# Patient Record
Sex: MALE | Race: OTHER | HISPANIC OR LATINO | Employment: OTHER | URBAN - METROPOLITAN AREA
[De-identification: names, ages, dates, MRNs, and addresses within clinical notes are randomized per-mention and may not be internally consistent; named-entity substitution may affect disease eponyms.]

---

## 2017-02-26 ENCOUNTER — GENERIC CONVERSION - ENCOUNTER (OUTPATIENT)
Dept: OTHER | Facility: OTHER | Age: 82
End: 2017-02-26

## 2017-06-27 ENCOUNTER — GENERIC CONVERSION - ENCOUNTER (OUTPATIENT)
Dept: OTHER | Facility: OTHER | Age: 82
End: 2017-06-27

## 2017-07-18 ENCOUNTER — GENERIC CONVERSION - ENCOUNTER (OUTPATIENT)
Dept: OTHER | Facility: OTHER | Age: 82
End: 2017-07-18

## 2017-08-08 ENCOUNTER — ALLSCRIPTS OFFICE VISIT (OUTPATIENT)
Dept: OTHER | Facility: OTHER | Age: 82
End: 2017-08-08

## 2017-08-08 DIAGNOSIS — I71.2 THORACIC AORTIC ANEURYSM WITHOUT RUPTURE (HCC): ICD-10-CM

## 2017-08-08 DIAGNOSIS — I71.4 ABDOMINAL AORTIC ANEURYSM WITHOUT RUPTURE (HCC): ICD-10-CM

## 2017-08-08 DIAGNOSIS — J44.9 CHRONIC OBSTRUCTIVE PULMONARY DISEASE (HCC): ICD-10-CM

## 2017-08-19 ENCOUNTER — HOSPITAL ENCOUNTER (OUTPATIENT)
Dept: CT IMAGING | Facility: HOSPITAL | Age: 82
Discharge: HOME | End: 2017-08-19
Attending: INTERNAL MEDICINE
Payer: COMMERCIAL

## 2017-08-19 DIAGNOSIS — J44.9 CHRONIC OBSTRUCTIVE PULMONARY DISEASE (HCC): ICD-10-CM

## 2017-08-19 PROCEDURE — 71275 CT ANGIOGRAPHY CHEST: CPT

## 2017-08-19 RX ADMIN — IOHEXOL 100 ML: 350 INJECTION, SOLUTION INTRAVENOUS at 09:39

## 2017-09-05 ENCOUNTER — HOSPITAL ENCOUNTER (OUTPATIENT)
Dept: NON INVASIVE DIAGNOSTICS | Facility: HOSPITAL | Age: 82
Discharge: HOME | End: 2017-09-05
Attending: INTERNAL MEDICINE
Payer: COMMERCIAL

## 2017-09-05 ENCOUNTER — HOSPITAL ENCOUNTER (OUTPATIENT)
Dept: NUCLEAR MEDICINE | Facility: HOSPITAL | Age: 82
Discharge: HOME | End: 2017-09-05
Attending: INTERNAL MEDICINE
Payer: COMMERCIAL

## 2017-09-05 DIAGNOSIS — I71.4 ABDOMINAL AORTIC ANEURYSM WITHOUT RUPTURE (HCC): ICD-10-CM

## 2017-09-05 DIAGNOSIS — J44.9 CHRONIC OBSTRUCTIVE PULMONARY DISEASE (HCC): ICD-10-CM

## 2017-09-05 DIAGNOSIS — I71.2 THORACIC AORTIC ANEURYSM WITHOUT RUPTURE (HCC): ICD-10-CM

## 2017-09-05 LAB
CHEST PAIN STATEMENT: NORMAL
MAX DIASTOLIC BP: 104 MMHG
MAX HEART RATE: 104 BPM
MAX PREDICTED HEART RATE: 140 BPM
MAX. SYSTOLIC BP: 180 MMHG
PROTOCOL NAME: NORMAL
REASON FOR TERMINATION: NORMAL
TARGET HR FORMULA: NORMAL
TIME IN EXERCISE PHASE: 198 S

## 2017-09-05 PROCEDURE — A9502 TC99M TETROFOSMIN: HCPCS

## 2017-09-05 PROCEDURE — 93306 TTE W/DOPPLER COMPLETE: CPT

## 2017-09-05 PROCEDURE — 93017 CV STRESS TEST TRACING ONLY: CPT

## 2017-09-05 PROCEDURE — 78452 HT MUSCLE IMAGE SPECT MULT: CPT

## 2017-09-05 RX ADMIN — REGADENOSON 0.4 MG: 0.08 INJECTION, SOLUTION INTRAVENOUS at 10:54

## 2017-11-02 ENCOUNTER — GENERIC CONVERSION - ENCOUNTER (OUTPATIENT)
Dept: OTHER | Facility: OTHER | Age: 82
End: 2017-11-02

## 2017-11-03 ENCOUNTER — GENERIC CONVERSION - ENCOUNTER (OUTPATIENT)
Dept: OTHER | Facility: OTHER | Age: 82
End: 2017-11-03

## 2017-11-06 ENCOUNTER — GENERIC CONVERSION - ENCOUNTER (OUTPATIENT)
Dept: OTHER | Facility: OTHER | Age: 82
End: 2017-11-06

## 2018-01-12 VITALS
SYSTOLIC BLOOD PRESSURE: 130 MMHG | BODY MASS INDEX: 29.59 KG/M2 | HEART RATE: 72 BPM | DIASTOLIC BLOOD PRESSURE: 92 MMHG | HEIGHT: 63 IN | WEIGHT: 167 LBS

## 2018-02-01 ENCOUNTER — OFFICE VISIT (OUTPATIENT)
Dept: CARDIAC SURGERY | Facility: CLINIC | Age: 83
End: 2018-02-01
Payer: COMMERCIAL

## 2018-02-01 VITALS
SYSTOLIC BLOOD PRESSURE: 128 MMHG | HEART RATE: 72 BPM | TEMPERATURE: 97.1 F | WEIGHT: 163 LBS | DIASTOLIC BLOOD PRESSURE: 72 MMHG | HEIGHT: 63 IN | RESPIRATION RATE: 14 BRPM | BODY MASS INDEX: 28.88 KG/M2

## 2018-02-01 DIAGNOSIS — I71.9 DESCENDING AORTIC ANEURYSM (HCC): Primary | ICD-10-CM

## 2018-02-01 PROBLEM — I10 HYPERTENSION: Status: ACTIVE | Noted: 2018-02-01

## 2018-02-01 PROBLEM — I73.9 PVD (PERIPHERAL VASCULAR DISEASE) (HCC): Status: ACTIVE | Noted: 2018-02-01

## 2018-02-01 PROBLEM — Z86.718 HISTORY OF DVT (DEEP VEIN THROMBOSIS): Status: ACTIVE | Noted: 2018-02-01

## 2018-02-01 PROBLEM — J44.9 COPD (CHRONIC OBSTRUCTIVE PULMONARY DISEASE) (HCC): Status: ACTIVE | Noted: 2018-02-01

## 2018-02-01 PROCEDURE — 99204 OFFICE O/P NEW MOD 45 MIN: CPT | Performed by: THORACIC SURGERY (CARDIOTHORACIC VASCULAR SURGERY)

## 2018-02-01 RX ORDER — PANTOPRAZOLE SODIUM 40 MG/1
1 TABLET, DELAYED RELEASE ORAL DAILY
COMMUNITY
Start: 2017-08-08 | End: 2018-06-25 | Stop reason: SDUPTHER

## 2018-02-01 RX ORDER — LOSARTAN POTASSIUM 100 MG/1
1 TABLET ORAL DAILY
COMMUNITY
Start: 2017-08-08 | End: 2018-07-30 | Stop reason: SDUPTHER

## 2018-02-01 RX ORDER — ALBUTEROL SULFATE 90 UG/1
2 AEROSOL, METERED RESPIRATORY (INHALATION)
COMMUNITY
Start: 2017-08-08

## 2018-02-01 RX ORDER — ASPIRIN 81 MG/1
81 TABLET ORAL DAILY
COMMUNITY
End: 2018-12-31 | Stop reason: HOSPADM

## 2018-02-01 RX ORDER — BUDESONIDE AND FORMOTEROL FUMARATE DIHYDRATE 160; 4.5 UG/1; UG/1
2 AEROSOL RESPIRATORY (INHALATION) 2 TIMES DAILY
COMMUNITY
End: 2018-06-25 | Stop reason: SDUPTHER

## 2018-02-01 RX ORDER — ALBUTEROL SULFATE 2.5 MG/3ML
SOLUTION RESPIRATORY (INHALATION) AS NEEDED
COMMUNITY
Start: 2017-08-08 | End: 2018-06-25 | Stop reason: SDUPTHER

## 2018-02-01 RX ORDER — ATORVASTATIN CALCIUM 10 MG/1
10 TABLET, FILM COATED ORAL DAILY
COMMUNITY
End: 2018-05-16 | Stop reason: SDUPTHER

## 2018-02-01 RX ORDER — ACETAMINOPHEN 325 MG/1
1-2 TABLET ORAL EVERY 4 HOURS PRN
COMMUNITY
Start: 2017-08-08 | End: 2018-12-31 | Stop reason: HOSPADM

## 2018-02-01 RX ORDER — AMLODIPINE BESYLATE 10 MG/1
10 TABLET ORAL DAILY
COMMUNITY
End: 2018-07-21

## 2018-02-01 NOTE — PROGRESS NOTES
Consultation - Cardiothoracic Surgery   Shola Puente [de-identified] y o  male MRN: [id number]    Physician Requesting Consult: No att  providers found    Reason for Consult / Principal Problem: Descending thoracic aortic aneurysm    History of Present Illness: Shola Puente is a [de-identified]y o  year old male who presents for evaluation of descending aortic aneurysm  Patient is Tajik speaking only & is accompanied by his grandson son who translates/assists in answering medical questions  Patient states this is a new diagnosis for him that was found on an incidental CT scan for a cough at [facility] a "few months ago"  Was dx w/ PNA & tx  States was discharged then referred to Dr Enrique Claros for further evaluation  Per Dr Enrique Claros notes patient was having some SOB, palpitations & vague CP at the time  Had CTA chest, TTE & rx stress test  Stress test WNL  TTE w/ preserved EF & no major valvular dysfunction  CTA w/ stable descending aortic aneurysm  Patient has attempted to see us twice w/ cancellations for personal reasons  Upon examination today, Mr Threese Lanza reports he is feeling well overall  Denies CP, palpitations, SOB, GONSALVES, LE edema b/l, orthopnea, PND, numbness/tinlging/paresthesias in UE or LE bilaterally, HA, lightheadeness, dizziness, presyncopal symptoms, hx syncopal events, N/V/D, hemoptysis, hematemesis, hematochezia, melena  Denies hx stroke, hx cancer w/ chest wall radiation, hx blood loss anemia, hx varicose veins or vein stripping  (+) h/o LLE DVT tx w/ medication  PMH includes HTn, COPD (wears 2LNC intermittently at home), PVD, h/o DVT (LLE, unsure of how long ago, tx w/ medications)  PSH includes hernia repair (unsure what kind, thinks inguinal, unsure of which side)  Denies FH of CAD/MI, HTN, HL, valvular disease, DM, aortic aneurysms, or SCD  Patient is retired  Lives in a multilevel home w/ wife  Does not use an assist device for ambulation  Does not drive but is active & performs ADLs independetly   (+) former tobacco use (<1ppd x20 years, quit 2003)  Denies current or prior use of ETOH or drugs  Allergies include PCN with rxn hives  Cardiac pertinent medications include: Aspirin 81mg, Lipitor 10mg, Symbicort, Losartan 100mg  Other medications can be reviewed in the patient chart  Patient sees Dr Emily Pearce (? Patient unsure of gomez / [facility]) as his primary care physician  Patient sees Dr Vanita Vega as his cardiologist     Past Medical History:  Past Medical History:   Diagnosis Date    Hypertension     Ulcer ([address] )     Varicose vein of leg      Past Surgical History:   Past Surgical History:   Procedure Laterality Date    HERNIA REPAIR       Family History:  Family History   Problem Relation Age of Onset    No Known Problems Mother     No Known Problems Father     Cancer Sister      Social History:    History   Alcohol Use No     History   Drug Use No     History   Smoking Status    Former Smoker    Years: 35 00    Quit date: 2003   Smokeless Tobacco    Never Used     Home Medications:   Prior to Admission medications    Medication Sig Start Date End Date Taking? Authorizing Provider   acetaminophen (TYLENOL) 325 mg tablet Take 1-2 tablets by mouth every 4 (four) hours as needed 8/8/17  Yes Historical Provider, MD   albuterol (2 5 mg/3 mL) 0 083 % nebulizer solution Inhale as needed 8/8/17  Yes Historical Provider, MD   albuterol (VENTOLIN HFA) 90 mcg/act inhaler Inhale 2 puffs 8/8/17  Yes Historical Provider, MD   losartan (COZAAR) 100 MG tablet Take 1 tablet by mouth daily 8/8/17  Yes Historical Provider, MD   pantoprazole (PROTONIX) 40 mg tablet Take 1 tablet by mouth daily 8/8/17  Yes Historical Provider, MD       Allergies:   Allergies   Allergen Reactions    Penicillins Hives and Itching       Review of Systems:  Review of Systems - History obtained from spouse and grandson, chart review and the patient  General ROS: negative  Hematological and Lymphatic ROS: negative  Endocrine ROS: negative  Respiratory ROS: negative  Cardiovascular ROS: negative  Gastrointestinal ROS: negative  Genito-Urinary ROS: negative  Musculoskeletal ROS: negative  Neurological ROS: negative  Dermatological ROS: negative  All other ROS negative    Vital Signs:     Vitals:    02/01/18 1419 02/01/18 1420   BP: 136/82 128/72   BP Location: Left arm Right arm   Patient Position: Sitting Sitting   Cuff Size: Adult Adult   Pulse: 72    Resp: 14    Temp: (!) 97 1 °F (36 2 °C)    TempSrc: Oral    Weight: 73 9 kg (163 lb)    Height: 5' 3" (1 6 m)      Invasive Devices          No matching active lines, drains, or airways          Physical Exam:    HEENT/NECK:  PERRLA  No jugular venous distention  Cardiac:RRR  (+)S1/S2  No murmurs/rubs/gallops  No heaves/lifts  Pulmonary:  Breath sounds clear bilaterally  Good inspiratory effort, equal expansion bilaterally  CTA b/l  No wheezes/rales/rhonchi  Abdomen:  Non-tender, Non-distended  Positive bowel sounds  (+) pulsitile mass felt slightly left of midline  Lower extremities: Extremities warm/dry  Radial/PT/DP pulses 2+ bilaterally  No edema B/L  Neuro: Alert and oriented X 3  Sensation is grossly intact  No focal deficits  Skin: Warm/Dry, without rashes or lesions      Lab Results:                 No results found for: HGBA1C  Lab Results   Component Value Date    CKTOTAL 105 02/26/2014       Imaging Studies:     CT Chest w/ 2/2014: aortic arch 3 7, ascending aorta 3 8, descending aorta 4 7, infrarenal AAA 3 3x3 9    CTA chest w/o 8/2017: ascending 3 7, distal arch 3 6, descending aorta 4 7    Trans thoracic Echocardiogram 9/2017: EF 55%, grade 1 DD, AV trileaflet w/o AS/AI    Rx Stress Test 9/2017: no CP, no EKG changes, no perfusion defects, EF 65%    I have personally reviewed pertinent films in PACS    Assessment:  Patient Active Problem List    Diagnosis Date Noted    Hypertension 02/01/2018    COPD (chronic obstructive pulmonary disease) [facility] ) 02/01/2018    PVD (peripheral vascular disease) ([facility] ) 2018    Descending aortic aneurysm ([facility] ) 2018     Descending aortic aneurysm;    Plan:    Mr  Albert Amos has been evaluated in our office today for descending aortic aneurysm  He is asymptomatic & it appears he may have had this in the past & was followed by a vascular surgeon based on his history of seeing Dr Ronaldo Handley & noted aneurysm from [facility] in   Most recent CT scan demonstrates maximum measurement of descending aorta at 4 7cm which is stable in comparison to prior studies  I counseled the patient on his lifestyle restrictions as well as routine follow-up with his cardiologist for management of his other cardiac conditions  Education was provided in regards to the followin )  Maintaining good blood pressure control and taking antihypertensive medications as prescribed  2 )  No strenuous activity involving lifting more than 50lbs  3 )  Awareness of the warning symptoms of aortic dissection such as chest pain, back pain, shortness of breath, lightheadedness or dizziness and to seek immediate medical attention at the nearest ER   4 )  The importance of continued surveillance with visits in the Aortic Disease clinic and obtaining CT Scans as recommended  5 ) Importance of avoiding tobacco products  We recommend follow-up in the aortic clinic in six months with CTA chest/abdomen/palvis for ongoing surveillance  Rey Poe was comfortable with our recommendations, and their questions were answered to their satisfaction  Thank you for allowing us to participate in the care of this patient  Aortic Aneurysm Instructions were provided to the patient as follows:    1  No lifting more than 50 pounds  2  Maintain a controlled blood pressure with a goal of 120/80  3  Follow up in Aortic Clinic as recommended with radiology follow up as instructed  4   Report to the ER or call 911 immediately with the following signs / symptoms: sudden onset of back pain, chest pain or shortness of breath  5  Tobacco cessation discussed      SIGNATURE: Pepe Cole PA-C  DATE: February 1, 2018  TIME: 2:29 PM

## 2018-02-01 NOTE — LETTER
February 1, 2018     Von Daniels MD  [address] [place] [address]    Patient: Juan C Sanches   YOB: 1935   Date of Visit: 2/1/2018       Dear Dr Mark Miller: Thank you for referring Juan C Sanches to me for evaluation  Below are my notes for this consultation  If you have questions, please do not hesitate to call me  I look forward to following your patient along with you  Sincerely,        Fatuma Waters MD        CC: MD Pepe Carrasco PA-C  2/1/2018  3:12 PM  Attested  Consultation - Cardiothoracic Surgery   Juan C Sanches [de-identified] y o  male MRN: [id number]    Physician Requesting Consult: No att  providers found    Reason for Consult / Principal Problem: Descending thoracic aortic aneurysm    History of Present Illness: Juan C Sanches is a [de-identified]y o  year old male who presents for evaluation of descending aortic aneurysm  Patient is Irish speaking only & is accompanied by his grandson son who translates/assists in answering medical questions  Patient states this is a new diagnosis for him that was found on an incidental CT scan for a cough at [facility] a "few months ago"  Was dx w/ PNA & tx  States was discharged then referred to Dr Darrell Bassett for further evaluation  Per Dr Darrell Bassett notes patient was having some SOB, palpitations & vague CP at the time  Had CTA chest, TTE & rx stress test  Stress test WNL  TTE w/ preserved EF & no major valvular dysfunction  CTA w/ stable descending aortic aneurysm  Patient has attempted to see us twice w/ cancellations for personal reasons  Upon examination today, Mr Brigitte Rodriguez reports he is feeling well overall  Denies CP, palpitations, SOB, GONSALVES, LE edema b/l, orthopnea, PND, numbness/tinlging/paresthesias in UE or LE bilaterally, HA, lightheadeness, dizziness, presyncopal symptoms, hx syncopal events, N/V/D, hemoptysis, hematemesis, hematochezia, melena   Denies hx stroke, hx cancer w/ chest wall radiation, hx blood loss anemia, hx varicose veins or vein stripping  (+) h/o LLE DVT tx w/ medication  PMH includes HTn, COPD (wears 2LNC intermittently at home), PVD, h/o DVT (LLE, unsure of how long ago, tx w/ medications)  PSH includes hernia repair (unsure what kind, thinks inguinal, unsure of which side)  Denies FH of CAD/MI, HTN, HL, valvular disease, DM, aortic aneurysms, or SCD  Patient is retired  Lives in a multilevel home w/ wife  Does not use an assist device for ambulation  Does not drive but is active & performs ADLs independetly  (+) former tobacco use (<1ppd x20 years, quit 2003)  Denies current or prior use of ETOH or drugs  Allergies include PCN with rxn hives  Cardiac pertinent medications include: Aspirin 81mg, Lipitor 10mg, Symbicort, Losartan 100mg  Other medications can be reviewed in the patient chart  Patient sees Dr Fatoumata Hilton [facility] as his primary care physician  Patient sees Dr Panfilo Jerome as his cardiologist     Past Medical History:  Past Medical History:   Diagnosis Date    Hypertension     Ulcer ([address] )     Varicose vein of leg      Past Surgical History:   Past Surgical History:   Procedure Laterality Date    HERNIA REPAIR       Family History:  Family History   Problem Relation Age of Onset    No Known Problems Mother     No Known Problems Father     Cancer Sister      Social History:    History   Alcohol Use No     History   Drug Use No     History   Smoking Status    Former Smoker    Years: 35 00    Quit date: 2003   Smokeless Tobacco    Never Used     Home Medications:   Prior to Admission medications    Medication Sig Start Date End Date Taking?  Authorizing Provider   acetaminophen (TYLENOL) 325 mg tablet Take 1-2 tablets by mouth every 4 (four) hours as needed 8/8/17  Yes Historical Provider, MD   albuterol (2 5 mg/3 mL) 0 083 % nebulizer solution Inhale as needed 8/8/17  Yes Historical Provider, MD   albuterol (VENTOLIN HFA) 90 mcg/act inhaler Inhale 2 puffs 8/8/17  Yes Historical Provider, MD   losartan (COZAAR) 100 MG tablet Take 1 tablet by mouth daily 8/8/17  Yes Historical Provider, MD   pantoprazole (PROTONIX) 40 mg tablet Take 1 tablet by mouth daily 8/8/17  Yes Historical Provider, MD       Allergies: Allergies   Allergen Reactions    Penicillins Hives and Itching       Review of Systems:  Review of Systems - History obtained from spouse and grandson, chart review and the patient  General ROS: negative  Hematological and Lymphatic ROS: negative  Endocrine ROS: negative  Respiratory ROS: negative  Cardiovascular ROS: negative  Gastrointestinal ROS: negative  Genito-Urinary ROS: negative  Musculoskeletal ROS: negative  Neurological ROS: negative  Dermatological ROS: negative  All other ROS negative    Vital Signs:     Vitals:    02/01/18 1419 02/01/18 1420   BP: 136/82 128/72   BP Location: Left arm Right arm   Patient Position: Sitting Sitting   Cuff Size: Adult Adult   Pulse: 72    Resp: 14    Temp: (!) 97 1 °F (36 2 °C)    TempSrc: Oral    Weight: 73 9 kg (163 lb)    Height: 5' 3" (1 6 m)      Invasive Devices          No matching active lines, drains, or airways          Physical Exam:    HEENT/NECK:  PERRLA  No jugular venous distention  Cardiac:RRR  (+)S1/S2  No murmurs/rubs/gallops  No heaves/lifts  Pulmonary:  Breath sounds clear bilaterally  Good inspiratory effort, equal expansion bilaterally  CTA b/l  No wheezes/rales/rhonchi  Abdomen:  Non-tender, Non-distended  Positive bowel sounds  (+) pulsitile mass felt slightly left of midline  Lower extremities: Extremities warm/dry  Radial/PT/DP pulses 2+ bilaterally  No edema B/L  Neuro: Alert and oriented X 3  Sensation is grossly intact  No focal deficits  Skin: Warm/Dry, without rashes or lesions      Lab Results:                 No results found for: HGBA1C  Lab Results   Component Value Date    CKTOTAL 105 02/26/2014       Imaging Studies:     CT Chest w/ 2/2014: aortic arch 3 7, ascending aorta 3 8, descending aorta 4 7, infrarenal AAA 3 3x3 9    CTA chest w/o 2017: ascending 3 7, distal arch 3 6, descending aorta 4 7    Trans thoracic Echocardiogram 2017: EF 55%, grade 1 DD, AV trileaflet w/o AS/AI    Rx Stress Test 2017: no CP, no EKG changes, no perfusion defects, EF 65%    I have personally reviewed pertinent films in PACS    Assessment:  Patient Active Problem List    Diagnosis Date Noted    Hypertension 2018    [facility]ch[facility] disease) [facility] ) 2018    PVD (peripheral vascular disease) ([facility] ) 2018    [facility] 2018     Descending aortic aneurysm;    Plan:    Mr Therese Lanza has been evaluated in our office today for descending aortic aneurysm  He is asymptomatic & it appears he may have had this in the past & was followed by a vascular surgeon based on his history of seeing Dr Joe Miller & noted aneurysm from [address] in   Most recent CT scan demonstrates maximum measurement of descending aorta at 4 7cm which is stable in comparison to prior studies  I counseled the patient on his lifestyle restrictions as well as routine follow-up with his cardiologist for management of his other cardiac conditions  Education was provided in regards to the followin )  Maintaining good blood pressure control and taking antihypertensive medications as prescribed  2 )  No strenuous activity involving lifting more than 50lbs  3 )  Awareness of the warning symptoms of aortic dissection such as chest pain, back pain, shortness of breath, lightheadedness or dizziness and to seek immediate medical attention at the nearest ER   4 )  The importance of continued surveillance with visits in the Aortic Disease clinic and obtaining CT Scans as recommended  5 ) Importance of avoiding tobacco products      We recommend follow-up in the aortic clinic in six months with CTA chest/abdomen/palvis for ongoing surveillance  Brandie Romero was comfortable with our recommendations, and their questions were answered to their satisfaction  Thank you for allowing us to participate in the care of this patient  Aortic Aneurysm Instructions were provided to the patient as follows:    1  No lifting more than 50 pounds  2  Maintain a controlled blood pressure with a goal of 120/80  3  Follow up in Aortic Clinic as recommended with radiology follow up as instructed  4  Report to the ER or call 911 immediately with the following signs / symptoms: sudden onset of back pain, chest pain or shortness of breath  5  Tobacco cessation discussed  Sandee Overton PA-C  DATE: February 1, 2018  TIME: 2:29 PM  Attestation signed by Bernardo Burk MD at 2/1/2018  3:18 PM:  The patient is an 51-year-old man with an incidental finding of a descending thoracic aorta aneurysm  He is completely asymptomatic  I have personally reviewed his imaging, this includes a CTA performed in September 2017 and 1 in February of 2014, there are some significant changes, maximum diameter of the descending thoracic aorta at the level of the inferior pulmonary vein is 4 3 centimeters, this is a significant change from 3 6 centimeters back in 2014  There is no need for surgical intervention at this point  I would like to see him back in 6 months with CTA of the chest abdomen and pelvis  He will also be referred for vascular surgeon for evaluation of a abdominal aorta aneurysm  The patient understand his diagnosis, my recommendation and agrees to proceed with them

## 2018-02-01 NOTE — PATIENT INSTRUCTIONS
Education was provided in regards to the followin )  Maintaining good blood pressure control and taking antihypertensive medications as prescribed  2 )  No strenuous activity involving lifting more than 50lbs  3 )  Awareness of the warning symptoms of aortic dissection such as chest pain, back pain, shortness of breath, lightheadedness or dizziness and to seek immediate medical attention at the nearest ER   4 )  The importance of continued surveillance with visits in the Aortic Disease clinic and obtaining CT Scans as recommended  5 ) Importance of avoiding tobacco products

## 2018-02-28 ENCOUNTER — OFFICE VISIT (OUTPATIENT)
Dept: VASCULAR SURGERY | Facility: CLINIC | Age: 83
End: 2018-02-28
Payer: COMMERCIAL

## 2018-02-28 VITALS
HEART RATE: 80 BPM | HEIGHT: 63 IN | DIASTOLIC BLOOD PRESSURE: 88 MMHG | WEIGHT: 163 LBS | RESPIRATION RATE: 14 BRPM | SYSTOLIC BLOOD PRESSURE: 138 MMHG | TEMPERATURE: 97.2 F | BODY MASS INDEX: 28.88 KG/M2

## 2018-02-28 DIAGNOSIS — I71.9 DESCENDING AORTIC ANEURYSM (HCC): ICD-10-CM

## 2018-02-28 DIAGNOSIS — I71.2 THORACIC AORTIC ANEURYSM WITHOUT RUPTURE (HCC): Primary | ICD-10-CM

## 2018-02-28 PROBLEM — I10 BENIGN ESSENTIAL HYPERTENSION: Status: ACTIVE | Noted: 2017-08-02

## 2018-02-28 PROBLEM — I71.20 THORACIC AORTIC ANEURYSM: Status: ACTIVE | Noted: 2017-08-02

## 2018-02-28 PROCEDURE — 99204 OFFICE O/P NEW MOD 45 MIN: CPT | Performed by: SURGERY

## 2018-02-28 NOTE — PATIENT INSTRUCTIONS
Aneurisma de la aorta torácica   LO QUE NECESITA SABER:   ¿Qué es un aneurisma de la aorta torácica? Un aneurisma de la aorta torácica, o AAT, es un abultamiento en la aorta que ocurre cuando las cox de la aorta se debilitan  La aorta es un vaso sanguíneo al que sale del corazón, pasa por el centro del pecho y llega al abdomen  ¿Cuál es la causa de un aneurisma de la aorta torácica? · Ateroesclerosis:  Es el endurecimiento de las arterias  Kelsey endurecimiento tiene lugar cuando se acumulan depósitos de grasa, que se conocen [address], en las cox de las arterias  · Desarrollo anormal:  Es posible que sj parte de rosa corazón y de rosa aorta no se hayan desarrollado de forma normal      · Inflamación:  Es posible que las cox de rosa aorta estén inflamadas flako resultado de sj enfermedad inflamatoria o de ciertas infecciones, flako la sífilis  · Pérdida de la elasticidad:  Con la edad, es posible que los vasos sanguíneos del cuerpo pierdan parte de rosa elasticidad  Es posible que esto se chago acentuado si tiene presión arterial elevada ramesh un período prolongado de [place]  Ciertos trastornos [employer] cox de las arterias pierdan elasticidad  · Trauma:  Las lesiones, particularmente en el área del [place], pueden conducir a sj ruptura parcial o total de la aorta  ¿Qué aumenta mi riesgo de tener un aneurisma de la aorta torácica? · Fumar cigarrillos    · Presión arterial ryan    · Tener un familiar cercano que ha padecido de AAT    · Ser de sexo masculino y mayor de 72 años de edad    · Diabetes     · Sobrepeso  ¿Cuáles son los signos y síntomas de un aneurisma de la aorta torácica? Es posible que usted no tenga ningún signo o síntoma   Cuando sí se presentan signos y síntomas, los siguientes son comunes:  · Dolor en el pecho, el aurelio, la espalda o el abdomen    · Tos o presencia de jeanette en el esputo    · Ronquera    · Dificultad para respirar que puede verse afectada por la posición    · Dificultad para tragar    · Sibilancias  ¿Cómo se diagnostica un aneurisma de la aorta torácica? Es posible que deba hacerse más de sj de las siguientes pruebas: Es posible que le administren un medio de contraste para ayudar a que las imágenes marito más claras  Dígale al médico si usted alguna vez ha tenido sj reacción alérgica al tinte de [place]  · Radiografía:  Muestran los pulmones, el corazón, la aorta y otras partes del cuerpo que se encuentran a rosa alrededor  [place] de la aorta con medio de contraste se llama aortograma o aortografía  · Tomografía computarizada:  Pam examen también se conoce flako escán TAC  [place] de audra x [place] sj computadora para samantha imágenes de rosa pecho y florin vasos sanguíneos  Es posible que las imágenes muestren un aneurisma de la aorta torácica  · Imágenes por resonancia magnética (IRM):  Para realizar pam estudio se utilizan imanes potentes y [place] computadora para samantha imágenes de rosa pecho y florin vasos sanguíneos  Es posible que las imágenes por resonancia magnética muestren un aneurisma de la aorta torácica  No entre a la cristian donde se realiza la resonancia magnética con algo de metal  El metal puede causar lesiones serias  Dígale al médico si usted tiene algo de metal por dentro o sobre rosa cuerpo  · Ecocardiograma transesofágico:        ¨ Sj ecocardiografía transesofágica (CARINA) es un tipo de ultrasonido que enseña imágenes del tamaño y forma de rosa corazón  También muestra la forma flako rosa corazón se mueve al palpitar  Estas imágenes pueden observarse en sj pantalla con aspecto de televisor  Es posible que usted necesite un ecocardiograma transesofágico si rosa corazón no se puede elvia muy girish en un ecocardiograma regular  También es posible que necesite un ecocardiograma transesofágico para revisar ciertos problemas flako coágulos de Jean o infecciones dentro de rosa corazón       ¨ Le aplicarán un medicamento para que usted permanezca relajado ramesh el ecocardiograma transesofágico  Los médicos colocarán un tubo en rosa boca y lo harán llegar hasta rosa esófago  El tubo está equipado con un sensor de ultrasonido en el extremo  Puesto que rosa esófago está ubicado junto al corazón, rosa médico podrá elvia claramente rosa corazón  ¿Cuál es el tratamiento para un aneurisma de la aorta torácica? · Medicamentos:  Es posible que le den medicamentos para la presión arterial o el colesterol con el fin de evitar que rosa aneurisma de la aorta torácica continúe creciendo  · Reparación:  Es posible que los médicos corten el aneurisma y reemplacen el área que cortaron con un tubo artificial  Si la válvula aórtica también tiene un problema, es posible que también la sustituyan  · Stent:  Es posible que coloquen un stent (tubo) en la porción de la aorta que tiene el aneurisma  El stent podría fortalecer la aorta y disminuir la presión en las cox de la aorta  ¿Cuáles son los riesgos de tener un aneurisma de la aorta torácica? Ollie resultado de la [place] médula platt se podría dañar, podría tener sj hemorragia o sj infección  Si sangra demasiado, podría producirse insuficiencia de florin órganos, ollie los [place]  Podría necesitar otra cirugía  Si no recibe tratamiento, el aneurisma de la aorta torácica puede causar más problemas graves  El aneurisma podría aumentar de carlin Walker y deeer en peligro rosa janet  ¿Cómo puedo controlar los síntomas? · Tómese la presión arterial franki ollie le indicaron:  Anote rosa presión arterial y traiga esta información a las consultas de seguimiento  Pregunte a rosa médico cuánto debería de tener de presión y cómo tomársela  La presión arterial elevada puede hacer que el aneurisma empeore  · Ejercicio:  Pida a rosa médico que lo ayude a crear un programa de ejercicio   Es posible que esto contribuya a bajar rosa presión arterial      · Consuma alimentos saludables y variados:  Los alimentos saludables incluyen frutas, verduras, pan integral, productos lácteos bajos en grasa, frijoles, [place] magras y pescado  Pregunte si necesita seguir sj dieta especial     · No fume:  Si usted fuma, nunca es demasiado tarde para dejar de hacerlo  El riesgo de tener un aneurisma de la aorta torácica y enfermedades cardíacas aumenta cuando se fuma  Solicite información a pruitt médico si usted necesita ayuda para dejar de fumar  ¿Cuándo manuel comunicarme con mi médico?   · Usted tiene fiebre  · Tiene nuevos síntomas desde la última consulta  · Cammie síntomas le impiden llevar a cabo cammie actividades diarias  · Usted tiene preguntas o inquietudes acerca de pruitt condición o cuidado  ¿Cuándo manuel buscar atención inmediata o llamar al 911? · Usted tiene náuseas y vómitos  · Siente dolor repentino en el pecho, el aurelio, la espalda o el abdomen  · La piel se le pone pálida y sudorosa o se siente muy débil  · Usted se siente mareado o se desmaya  ACUERDOS SOBRE PRUITT CUIDADO:   Usted tiene el derecho de ayudar a planear pruitt cuidado  Aprenda todo lo que pueda sobre pruitt condición y flako darle tratamiento  Discuta cammie opciones de tratamiento con cammie médicos para decidir el cuidado que usted desea recibir  Usted siempre tiene el derecho de rechazar el tratamiento  Esta información es sólo para uso en educación  Pruitt intención no es darle un consejo médico sobre enfermedades o tratamientos  Colsulte con pruitt Jacqui Roberto farmacéutico antes de seguir cualquier régimen médico para saber si es seguro y efectivo para usted  © 2017 [address] Information is for End User's use only and may not be sold, redistributed or otherwise used for commercial purposes  All illustrations and images included in CareNotes® are the copyrighted property of A D A M , Inc  or Brian Fall

## 2018-02-28 NOTE — PROGRESS NOTES
Assessment/Plan:    Patient is a [de-identified] yo M w/ HTN, CAD, COPD, hx DVT? (list in chart and has s/s of venous insufficiency but patient denies hx), small ascending aortic aneurysm, 46mm TAA, presents for vascular eval    Thoracic aortic aneurysm without rupture ([facility] [address] )  -     VAS abdominal aorta/iliacs; complete study; Future  -     VAS lower limb arterial duplex, complete bilateral; Future  -reviewed CTA of the chest which shows 46mm TAA; this CT does not include the abdomen and I cannot find any other imaging in the past that evaluates for AAA  -will get AOIL to eval for AAA although I do not palpate this on exam and will get LEADs to r/o popliteal and femoral aneruysm (femorals are prominent B; suspect ectasia)  -f/u after testing complete  -of note, patient is scheduled for CTA C/A/P in 8/18 by his PCP    Ascending aortic aneurysm ([facility] [address] )  -being managed by CT surgery; only 37mm at this time      Medications  -cont ASA/statin for life for best medical management    Subjective:      Patient ID: Francisco Cortes is a [de-identified] y o  male  CC  Patient is here to review test results  Patient is new to the practice and referred by Dr Joe Halsted  Patient had CTA on 8/19  He denies stomach pain  He denies pain after eating  He has intermittent back pain  Patient was found to have TAA and small ascending AA on imaging  He was referred here by CT surg to evaluate for possible AAA  Patient denies abdominal pain  Does complain of chronic mild back pain, unchanged recently  Deneis family hx of aneurysm  Accompanied by wife and grandson  Patient Uzbek only speaking  Interview conducted with phone Uzbek language interpretor  Denies claudication sxs  Denies wounds  Does have prominent varicose veins, L>R  Denies hx of DVT although his chart hx suggests that he has had this          The following portions of the patient's history were reviewed and updated as appropriate: allergies, current medications, past family history, past medical history, past social history, past surgical history and problem list     Review of Systems   Constitutional: Negative  HENT: Positive for hearing loss and sneezing  Eyes: Positive for discharge, redness, itching and visual disturbance  Sudden vision loss   Respiratory: Positive for wheezing  Cardiovascular: Positive for leg swelling  Negative for chest pain  Gastrointestinal: Negative  Negative for abdominal pain  Endocrine: Negative  Genitourinary: Negative  Musculoskeletal: Positive for back pain  Leg pain, leg pain with walking   Skin: Negative  Negative for wound  Allergic/Immunologic: Negative  Neurological: Positive for headaches  Hematological: Negative  Psychiatric/Behavioral: Negative  Objective:      /88 (BP Location: Right arm, Patient Position: Sitting, Cuff Size: Adult)   Pulse 80   Temp (!) 97 2 °F (36 2 °C) (Tympanic)   Resp 14   Ht 5' 3" (1 6 m)   Wt 73 9 kg (163 lb)   BMI 28 87 kg/m²          Physical Exam   Constitutional: He is oriented to person, place, and time  He appears well-developed and well-nourished  HENT:   Head: Normocephalic and atraumatic  Eyes: Conjunctivae are normal    Neck: Normal range of motion  Neck supple  Cardiovascular: Normal rate, regular rhythm and normal heart sounds  No murmur heard  Pulses:       Radial pulses are 2+ on the right side, and 2+ on the left side  Femoral pulses are 3+ on the right side, and 3+ on the left side  Popliteal pulses are 2+ on the right side, and 2+ on the left side  Dorsalis pedis pulses are 2+ on the right side, and 2+ on the left side  Posterior tibial pulses are 2+ on the right side, and 0 on the left side  No carotid bruit B   Pulmonary/Chest: Effort normal  He has wheezes (moderate to severe expiratory wheezes)  Abdominal: Soft  He exhibits no distension and no mass (do not feel pulsatile mass on exam)   There is no tenderness  There is no rebound  Musculoskeletal: Normal range of motion  He exhibits no edema  Neurological: He is alert and oriented to person, place, and time  Skin: Skin is warm and dry  Psychiatric: He has a normal mood and affect  His behavior is normal    Nursing note and vitals reviewed  Vitals:    02/28/18 1437   BP: 138/88   BP Location: Right arm   Patient Position: Sitting   Cuff Size: Adult   Pulse: 80   Resp: 14   Temp: (!) 97 2 °F (36 2 °C)   TempSrc: Tympanic   Weight: 73 9 kg (163 lb)   Height: 5' 3" (1 6 m)       Patient Active Problem List   Diagnosis    Hypertension    COPD (chronic obstructive pulmonary disease) (HCC)    Descending aortic aneurysm (HCC)    History of DVT (deep vein thrombosis)    Benign essential hypertension    Thoracic aortic aneurysm (HCC)       Past Surgical History:   Procedure Laterality Date    HERNIA REPAIR      VARICOSE VEIN SURGERY         Family History   Problem Relation Age of Onset    No Known Problems Mother     No Known Problems Father     Cancer Sister        Social History     Social History    Marital status: /Civil Union     Spouse name: N/A    Number of children: N/A    Years of education: N/A     Occupational History    Not on file       Social History Main Topics    Smoking status: Former Smoker     Years: 35 00     Quit date: 2003    Smokeless tobacco: Never Used    Alcohol use No    Drug use: No    Sexual activity: Not on file     Other Topics Concern    Not on file     Social History Narrative    No narrative on file       Allergies   Allergen Reactions    Penicillins Hives and Itching         Current Outpatient Prescriptions:     acetaminophen (TYLENOL) 325 mg tablet, Take 1-2 tablets by mouth every 4 (four) hours as needed, Disp: , Rfl:     albuterol (2 5 mg/3 mL) 0 083 % nebulizer solution, Inhale as needed, Disp: , Rfl:     albuterol (VENTOLIN HFA) 90 mcg/act inhaler, Inhale 2 puffs, Disp: , Rfl:    amLODIPine (NORVASC) 10 mg tablet, Take 10 mg by mouth daily, Disp: , Rfl:     aspirin (ECOTRIN LOW STRENGTH) 81 mg EC tablet, Take 81 mg by mouth daily, Disp: , Rfl:     atorvastatin (LIPITOR) 10 mg tablet, Take 10 mg by mouth daily, Disp: , Rfl:     budesonide-formoterol (SYMBICORT) 160-4 5 mcg/act inhaler, Inhale 2 puffs 2 (two) times a day, Disp: , Rfl:     losartan (COZAAR) 100 MG tablet, Take 1 tablet by mouth daily, Disp: , Rfl:     pantoprazole (PROTONIX) 40 mg tablet, Take 1 tablet by mouth daily, Disp: , Rfl:

## 2018-04-02 ENCOUNTER — DOCUMENTATION (OUTPATIENT)
Dept: PULMONOLOGY | Facility: CLINIC | Age: 83
End: 2018-04-02

## 2018-04-02 ENCOUNTER — TELEPHONE (OUTPATIENT)
Dept: PULMONOLOGY | Facility: CLINIC | Age: 83
End: 2018-04-02

## 2018-04-02 ENCOUNTER — OFFICE VISIT (OUTPATIENT)
Dept: PULMONOLOGY | Facility: CLINIC | Age: 83
End: 2018-04-02
Payer: COMMERCIAL

## 2018-04-02 VITALS
SYSTOLIC BLOOD PRESSURE: 124 MMHG | HEIGHT: 63 IN | TEMPERATURE: 97.8 F | RESPIRATION RATE: 16 BRPM | WEIGHT: 165 LBS | DIASTOLIC BLOOD PRESSURE: 84 MMHG | HEART RATE: 77 BPM | BODY MASS INDEX: 29.23 KG/M2 | OXYGEN SATURATION: 88 %

## 2018-04-02 DIAGNOSIS — J96.11 CHRONIC RESPIRATORY FAILURE WITH HYPOXIA (HCC): ICD-10-CM

## 2018-04-02 DIAGNOSIS — J44.9 CHRONIC OBSTRUCTIVE PULMONARY DISEASE, UNSPECIFIED COPD TYPE (HCC): Primary | ICD-10-CM

## 2018-04-02 PROCEDURE — 99204 OFFICE O/P NEW MOD 45 MIN: CPT | Performed by: INTERNAL MEDICINE

## 2018-04-02 RX ORDER — METOPROLOL TARTRATE 50 MG/1
50 TABLET, FILM COATED ORAL EVERY 12 HOURS SCHEDULED
COMMUNITY
End: 2018-10-11 | Stop reason: SDUPTHER

## 2018-04-02 RX ORDER — METHYLPREDNISOLONE 4 MG/1
TABLET ORAL
Qty: 21 TABLET | Refills: 0 | Status: SHIPPED | OUTPATIENT
Start: 2018-04-02 | End: 2018-08-02 | Stop reason: ALTCHOICE

## 2018-04-02 NOTE — PROGRESS NOTES
Consultation - Pulmonary Medicine   Jeremiah Perez [de-identified] y o  male MRN: [id number]        Physician Requesting Consult: Cole Durbin MD  Reason for Consult: hypoxic respiratory failure    COPD (chronic obstructive pulmonary disease) ([address] )  Continue Symbicort and p r n  Albuterol  To consider Spiriva in the future if he starts to have more shortness of breath, also to consider PFTs in the future if symptomatic  I believe his wheezing is chronic but I will give him a course of Medrol Dosepak for now  Chronic respiratory failure with hypoxia (HCC)  Continue oxygen, will do 6 minutes walk test to provide him with portable oxygen since he is switching is medical equipment company for that reason  Based on elevated bicarb on Chem 7 and without being on diuretics I suspect patient has chronic hypercapnia as well  In the future I may consider doing ABG with PFTs  At this time patient is stable and denies much complaints  Our 6 minutes walk test in office confirms the need for 2 liters/minute as documented in the note   I ordered oxygen concentrator approved by FAA to carry while traveling to [facility] the in 2 days  I filled documents required as well for travel  Our office called the [address] [place] [address] and provided all documentation in an attempt to help him getting the concentrator       ______________________________________________________________________    HPI:    Jeremiah Perez is a [de-identified] y o  male who presents for evaluation of hypoxia and need of portable oxygen to travel  Patient was diagnosed with COPD more than 2 years ago and currently on Symbicort 160/4 5 b i d  and p r n  albuterol and has nebulizer at home, used to be on Spiriva in the past but was stopped for unknown reason but denies any complications with urinary retention at that time, denies recent exacerbation of COPD  He was started on oxygen at 2 liters/minute since 2 years    He denies any shortness of breath or dyspnea on exertion while on oxygen as he states, he has intermittent wheezing and intermittent mild cough, but he has morning sputum production chronically  He denies any chest pain or fever or chills, denies any hemoptysis or weight loss, denies any orthopnea  He has chronic mild swelling in his left lower extremity since he had varicose vein laser therapy in the past   He had DVT remotely and currently not on anticoagulation  He lives with his wife  He was accompanied by his son home was interpreting to [address] [place] [address]  Patient is planning to travel to [place] in 2 days and he stayed there for 6 days  He is requesting a portable oxygen to take with him  He smoked 4-6 cpd x 40 years, quit 2 years ago  Review of Systems:  Review of Systems   Constitutional: Negative  HENT: Negative  Eyes: Negative  Respiratory:        As HPI   Cardiovascular: Negative  Gastrointestinal: Negative  Endocrine: Negative  Genitourinary: Negative  Musculoskeletal: Negative  Skin: Negative  Allergic/Immunologic: Negative  Neurological: Negative  Hematological: Negative  Psychiatric/Behavioral: Negative  Historical Information   Past Medical History:   Diagnosis Date    Hypertension     Ulcer ([facility] [address] )     Varicose vein of leg      Past Surgical History:   Procedure Laterality Date    HERNIA REPAIR      VARICOSE VEIN SURGERY       Social History   History   Smoking Status    Former Smoker    Years: 35 00    Quit date: 2003   Smokeless Tobacco    Never Used       Occupational history:  Retired, used to do construction work  Denies exposures      Family History:   Family History   Problem Relation Age of Onset    No Known Problems Mother     No Known Problems Father     Cancer Sister          PhysicalExamination:  Vitals:   /84   Pulse 77   Temp 97 8 °F (36 6 °C)   Resp 16   Ht 5' 3" (1 6 m)   Wt 74 8 kg (165 lb)   SpO2 (!) 88%   BMI 29 23 kg/m²     On room air pulse ox was 86% General: alert, not in acute distress  HEENT:  Left pupil distorted, right pupil reactive, no icteric sclera or cyanosis, no thrush  Neck:  Supple, no lymphadenopathy or thyromegaly, no JVD  Lungs:  Equal but diminished breath sounds bilaterally, no crackles, bilateral mild wheezing   Heart: S1S2 regular, no murmures or gallops  Abdomen: soft, non-tender, bowel sounds  present  Extrimities:  Trace edema in lower extremities specially left leg, no clubbing or cyanosis  Neuro: Alert and oriented x 3, no focal neurodeficits   Skin: intact, no rashes      Diagnostic Data:    6 minutes walk test POC in our office:  Patient had pulse ox 86% on room air at rest heart rate 70, on 2 L nasal cannula he was able to walk total 199 5 meters and his pulse ox remained 89-93% on 2 L nasal cannula    Labs: I personally reviewed the most recent laboratory data pertinent to today's visit    Lab Results   Component Value Date    WBC 13 51 (H) 03/01/2014    HGB 14 2 03/01/2014    HCT 45 7 03/01/2014    MCV 99 (H) 03/01/2014     03/01/2014     Lab Results   Component Value Date    GLUCOSE 141 (H) 03/01/2014    CALCIUM 8 9 03/01/2014     03/01/2014    K 4 1 03/01/2014    CO2 34 (H) 03/01/2014     03/01/2014    BUN 26 03/01/2014    CREATININE 0 85 03/01/2014     No results found for: IGE  No results found for: ALT, AST, GGT, ALKPHOS, BILITOT      Imaging:  I personally reviewed the images on the [facility] system pertinent to today's visit  Chest CT scan from 2017 reviewed on PACs:  Mild emphysematous changes, no lung masses or nodules    Other studies:  Echocardiogram 2017: LVEF 62%, grade 1 diastolic dysfunction, normal RV   myocardial perfusion SPECT scan 2017: IMPRESSIONS: Normal study after pharmacologic vasodilation  Myocardial perfusion imaging was normal at rest and with stress   Left ventricular systolic function was normal         Wiley Crowell MD

## 2018-04-02 NOTE — TELEPHONE ENCOUNTER
Pt is leaving for [place] on Wednesday   Kyajacinda Rooney from [address] [place] [address] to let you know, she faxed over a correct  portable oxygen concentrator order all the doctor needs is to signed order along with notes   Her tel [phone]

## 2018-04-02 NOTE — ASSESSMENT & PLAN NOTE
Continue oxygen, will do 6 minutes walk test to provide him with portable oxygen since he is switching is medical equipment company for that reason  Based on elevated bicarb on Chem 7 and without being on diuretics I suspect patient has chronic hypercapnia as well  In the future I may consider doing ABG with PFTs  At this time patient is stable and denies much complaints  Our 6 minutes walk test in office confirms the need for 2 liters/minute as documented in the note   I ordered oxygen concentrator approved by FAA to carry while traveling to [place] the in 2 days  I filled documents required as well for travel  Our office called the [address] [place] [address] and provided all documentation in an attempt to help him getting the concentrator

## 2018-04-02 NOTE — ASSESSMENT & PLAN NOTE
Continue Symbicort and p r n  Albuterol  To consider Spiriva in the future if he starts to have more shortness of breath, also to consider PFTs in the future if symptomatic  I believe his wheezing is chronic but I will give him a course of Medrol Dosepak for now

## 2018-04-17 ENCOUNTER — HOSPITAL ENCOUNTER (OUTPATIENT)
Dept: NON INVASIVE DIAGNOSTICS | Facility: CLINIC | Age: 83
Discharge: HOME | End: 2018-04-17
Payer: COMMERCIAL

## 2018-04-17 DIAGNOSIS — I71.2 THORACIC AORTIC ANEURYSM WITHOUT RUPTURE (HCC): ICD-10-CM

## 2018-04-17 PROCEDURE — 93925 LOWER EXTREMITY STUDY: CPT

## 2018-04-17 PROCEDURE — 93923 UPR/LXTR ART STDY 3+ LVLS: CPT

## 2018-04-18 PROCEDURE — 93925 LOWER EXTREMITY STUDY: CPT | Performed by: SURGERY

## 2018-04-18 PROCEDURE — 93922 UPR/L XTREMITY ART 2 LEVELS: CPT | Performed by: SURGERY

## 2018-05-16 DIAGNOSIS — E78.5 HYPERLIPIDEMIA, UNSPECIFIED HYPERLIPIDEMIA TYPE: Primary | ICD-10-CM

## 2018-05-16 RX ORDER — ATORVASTATIN CALCIUM 10 MG/1
10 TABLET, FILM COATED ORAL DAILY
Qty: 90 TABLET | Refills: 4 | Status: SHIPPED | OUTPATIENT
Start: 2018-05-16 | End: 2018-07-21

## 2018-05-23 ENCOUNTER — OFFICE VISIT (OUTPATIENT)
Dept: VASCULAR SURGERY | Facility: CLINIC | Age: 83
End: 2018-05-23
Payer: COMMERCIAL

## 2018-05-23 VITALS
HEIGHT: 63 IN | BODY MASS INDEX: 29.41 KG/M2 | DIASTOLIC BLOOD PRESSURE: 72 MMHG | RESPIRATION RATE: 16 BRPM | WEIGHT: 166 LBS | TEMPERATURE: 96.6 F | HEART RATE: 74 BPM | SYSTOLIC BLOOD PRESSURE: 146 MMHG

## 2018-05-23 DIAGNOSIS — I83.813 VARICOSE VEINS OF BOTH LOWER EXTREMITIES WITH PAIN: ICD-10-CM

## 2018-05-23 DIAGNOSIS — Z86.718 HISTORY OF DVT (DEEP VEIN THROMBOSIS): ICD-10-CM

## 2018-05-23 DIAGNOSIS — I72.4 POPLITEAL ARTERY ANEURYSM, BILATERAL (HCC): Primary | ICD-10-CM

## 2018-05-23 DIAGNOSIS — I71.2 THORACIC AORTIC ANEURYSM WITHOUT RUPTURE (HCC): ICD-10-CM

## 2018-05-23 PROCEDURE — 99214 OFFICE O/P EST MOD 30 MIN: CPT | Performed by: SURGERY

## 2018-05-23 NOTE — PROGRESS NOTES
Assessment/Plan:    Patient is a [de-identified] yo M w/ HTN, CAD, COPD, hx LLE DVT, small ascending aortic aneurysm, 46mm TAA, returns to review DU    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HCC)  -     VAS lower limb arterial duplex, complete bilateral; Future  -reviewed LEADs which shows ectasiac of the CFA (1 1/1 0) and popliteals (0 8/0 9)  -discussed indications for treatment; will continue to monitor at this time  -next LEADs in 1 year    History of DVT (deep vein thrombosis)  Varicose veins of both lower extremities with pain  -     Compression Stocking  -hx of LLE DVT although no imaging for review; pt poor historian regarding circumstances and treatment  -does appear to have had LLE vein stripping in the past with recurrent varicosities and edema  -will start medical management program with daily compression use, leg elevation, activity, weight loss    Thoracic aortic aneurysm without rupture ([facility] [address] )  -reviewed CTA of the chest which shows 46mm TAA; this CT does not include the abdomen and I cannot find any other imaging in the past that evaluates for AAA  -ordered AOIL at last appt to eval for AAA but he no showed this appt; (I do not palpate this on exam, but obese abdomen)   -will send letter if AOIL is nl or ectatic; will call if aneurysm is found  -of note, patient is scheduled for CTA C/A/P in 8/18 by his PCP    Ascending aortic aneurysm ([facility] [address] )  -being managed by CT surgery; only 37mm at this time    Medications  -cont ASA/statin for life for best medical management    Subjective:      Patient ID: Peggy Bell is a [de-identified] y o  male  Patient had WILLIE on 4/17  Patient has bilat leg cramping with walking that has been getting worse  He has intermittent back pain  He denies abdominal pain  He denies post prandial pain  He denies any open wounds or sores  HPI:    Patient was found to have TAA and small ascending AA on imaging    He was referred here by CT surg to evaluate for possible AAA      Patient denies abdominal pain  Does complain of chronic mild back pain, unchanged recently  Deneis family hx of aneurysm  Accompanied by wife and grandson  Patient Czech only speaking  Interview conducted with grandson as interpretor  Denies claudication sxs  Denies wounds  Does have prominent varicose veins, L>R  He notes some LLE pain/aching  Reports hx of DVT (although denied this on last visit) and can't give details of circumstances or treatment  He also reports hx of varicose vein stripping  Has recurrence of varicosities  Since last visit, patient reports no changes  He had his LEADs done but no showed his AOIL  There is some question of whether he had this done as [facility] but doesn't have any paperwork, report, or knowledge of results  Unclear if this was even done or if he is referring to another test     He does use CPAP at night and has SOB sometimes, but not today  The following portions of the patient's history were reviewed and updated as appropriate: allergies, current medications, past family history, past medical history, past social history, past surgical history and problem list     Review of Systems   Constitutional: Positive for fatigue  HENT: Negative  Eyes: Positive for discharge  Respiratory: Positive for cough, shortness of breath and wheezing  Cardiovascular: Positive for leg swelling  Gastrointestinal: Negative  Endocrine: Negative  Genitourinary: Negative  Musculoskeletal: Positive for arthralgias (B knee pain), back pain and myalgias  Leg pain   Skin: Positive for color change  Negative for wound  Allergic/Immunologic: Negative  Neurological: Negative  Hematological: Negative  Psychiatric/Behavioral: Negative            Objective:      /72 (BP Location: Left arm, Patient Position: Sitting, Cuff Size: Adult)   Pulse 74   Temp (!) 96 6 °F (35 9 °C) (Tympanic)   Resp 16   Ht 5' 3" (1 6 m)   Wt 75 3 kg (166 lb)   BMI 29 41 kg/m² Physical Exam   Constitutional: He is oriented to person, place, and time  He appears well-developed and well-nourished  HENT:   Head: Normocephalic and atraumatic  Eyes: Conjunctivae are normal    Neck: Normal range of motion  Neck supple  Cardiovascular: Normal rate, regular rhythm and normal heart sounds  No murmur heard  Pulses:       Radial pulses are 2+ on the right side, and 2+ on the left side  Femoral pulses are 3+ on the right side, and 3+ on the left side  Popliteal pulses are 2+ on the right side, and 2+ on the left side  Dorsalis pedis pulses are 2+ on the right side, and 2+ on the left side  Posterior tibial pulses are 2+ on the right side, and 2+ on the left side  No carotid bruit B   Pulmonary/Chest: Effort normal  He has wheezes (moderate to severe expiratory wheezes B)  Abdominal: Soft  He exhibits no distension and no mass (do not feel pulsatile mass on exam)  There is no tenderness  There is no rebound  Musculoskeletal: Normal range of motion  He exhibits no edema  Neurological: He is alert and oriented to person, place, and time  Skin: Skin is warm and dry  Psychiatric: He has a normal mood and affect  His behavior is normal    Nursing note and vitals reviewed          Vitals:    05/23/18 1143   BP: 146/72   BP Location: Left arm   Patient Position: Sitting   Cuff Size: Adult   Pulse: 74   Resp: 16   Temp: (!) 96 6 °F (35 9 °C)   TempSrc: Tympanic   Weight: 75 3 kg (166 lb)   Height: 5' 3" (1 6 m)       Patient Active Problem List   Diagnosis    Hypertension    COPD (chronic obstructive pulmonary disease) (HCC)    Descending aortic aneurysm (HCC)    History of DVT (deep vein thrombosis)    Benign essential hypertension    Thoracic aortic aneurysm (HCC)    Chronic respiratory failure with hypoxia (HCC)    Varicose veins of both lower extremities with pain    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HCC)       Past Surgical History: Procedure Laterality Date    HERNIA REPAIR      VARICOSE VEIN SURGERY         Family History   Problem Relation Age of Onset    No Known Problems Mother     No Known Problems Father     Cancer Sister        Social History     Social History    Marital status: /Civil Union     Spouse name: N/A    Number of children: N/A    Years of education: N/A     Occupational History    Not on file       Social History Main Topics    Smoking status: Former Smoker     Years: 35 00     Quit date: 2003    Smokeless tobacco: Never Used    Alcohol use No    Drug use: No    Sexual activity: Not on file     Other Topics Concern    Not on file     Social History Narrative    No narrative on file       Allergies   Allergen Reactions    Penicillins Hives and Itching         Current Outpatient Prescriptions:     acetaminophen (TYLENOL) 325 mg tablet, Take 1-2 tablets by mouth every 4 (four) hours as needed, Disp: , Rfl:     albuterol (2 5 mg/3 mL) 0 083 % nebulizer solution, Inhale as needed, Disp: , Rfl:     albuterol (VENTOLIN HFA) 90 mcg/act inhaler, Inhale 2 puffs, Disp: , Rfl:     amLODIPine (NORVASC) 10 mg tablet, Take 10 mg by mouth daily, Disp: , Rfl:     aspirin (ECOTRIN LOW STRENGTH) 81 mg EC tablet, Take 81 mg by mouth daily, Disp: , Rfl:     atorvastatin (LIPITOR) 10 mg tablet, Take 1 tablet (10 mg total) by mouth daily, Disp: 90 tablet, Rfl: 4    budesonide-formoterol (SYMBICORT) 160-4 5 mcg/act inhaler, Inhale 2 puffs 2 (two) times a day, Disp: , Rfl:     losartan (COZAAR) 100 MG tablet, Take 1 tablet by mouth daily, Disp: , Rfl:     Methylprednisolone 4 MG TBPK, Use as directed on package, Disp: 21 tablet, Rfl: 0    metoprolol tartrate (LOPRESSOR) 50 mg tablet, Take 50 mg by mouth every 12 (twelve) hours, Disp: , Rfl:     pantoprazole (PROTONIX) 40 mg tablet, Take 1 tablet by mouth daily, Disp: , Rfl:

## 2018-05-23 NOTE — PATIENT INSTRUCTIONS
1) Aneurysms  -the thoracic aneurysm (in your chest) is being followed by your cardiothoracic surgeon; you have a CT scan ordered for August to monitor this  -please get the ultrasound of the abdomen that I ordered to evaluate the aorta (biggest artery in the abdomen) for aneurysms  -you have enlargement of the arteries in the legs which we will monitor with ultrasound and do not need surgery at this point  -the blood flow down the legs is excellent and not the cause of your leg symptoms    2) venous varicose veins  -please wear your stockings daily and remove at night for sleep  -also, elevate your legs, remain active, and try to maintain a healthy weight    3) please followup with your pulmonologist regarding your COPD and breathing

## 2018-05-31 ENCOUNTER — DOCUMENTATION (OUTPATIENT)
Dept: PULMONOLOGY | Facility: CLINIC | Age: 83
End: 2018-05-31

## 2018-06-25 ENCOUNTER — TELEPHONE (OUTPATIENT)
Dept: FAMILY MEDICINE CLINIC | Facility: CLINIC | Age: 83
End: 2018-06-25

## 2018-06-25 DIAGNOSIS — J44.9 CHRONIC OBSTRUCTIVE PULMONARY DISEASE, UNSPECIFIED COPD TYPE (HCC): Primary | ICD-10-CM

## 2018-06-25 DIAGNOSIS — K21.9 GASTROESOPHAGEAL REFLUX DISEASE WITHOUT ESOPHAGITIS: ICD-10-CM

## 2018-06-25 NOTE — TELEPHONE ENCOUNTER
Pt states needs refills on breathing medication I dont see a current med list and he needs something for brochitis for his throat

## 2018-06-26 RX ORDER — PANTOPRAZOLE SODIUM 40 MG/1
40 TABLET, DELAYED RELEASE ORAL DAILY
Qty: 30 TABLET | Refills: 2 | Status: SHIPPED | OUTPATIENT
Start: 2018-06-26 | End: 2018-08-20 | Stop reason: SDUPTHER

## 2018-06-26 RX ORDER — BUDESONIDE AND FORMOTEROL FUMARATE DIHYDRATE 160; 4.5 UG/1; UG/1
2 AEROSOL RESPIRATORY (INHALATION) 2 TIMES DAILY
Qty: 1 INHALER | Refills: 0 | Status: SHIPPED | OUTPATIENT
Start: 2018-06-26 | End: 2018-07-21

## 2018-06-26 RX ORDER — ALBUTEROL SULFATE 2.5 MG/3ML
2.5 SOLUTION RESPIRATORY (INHALATION) EVERY 6 HOURS PRN
Qty: 75 ML | Refills: 1 | Status: SHIPPED | OUTPATIENT
Start: 2018-06-26

## 2018-07-05 ENCOUNTER — HOSPITAL ENCOUNTER (OUTPATIENT)
Dept: NON INVASIVE DIAGNOSTICS | Facility: CLINIC | Age: 83
Discharge: HOME | End: 2018-07-05
Payer: COMMERCIAL

## 2018-07-05 DIAGNOSIS — I71.2 THORACIC AORTIC ANEURYSM WITHOUT RUPTURE (HCC): ICD-10-CM

## 2018-07-05 PROCEDURE — 93978 VASCULAR STUDY: CPT

## 2018-07-05 PROCEDURE — 93978 VASCULAR STUDY: CPT | Performed by: SURGERY

## 2018-07-15 ENCOUNTER — APPOINTMENT (EMERGENCY)
Dept: RADIOLOGY | Facility: HOSPITAL | Age: 83
End: 2018-07-15
Payer: COMMERCIAL

## 2018-07-15 ENCOUNTER — HOSPITAL ENCOUNTER (EMERGENCY)
Facility: HOSPITAL | Age: 83
Discharge: HOME | End: 2018-07-15
Attending: EMERGENCY MEDICINE | Admitting: EMERGENCY MEDICINE
Payer: COMMERCIAL

## 2018-07-15 VITALS
TEMPERATURE: 98.8 F | RESPIRATION RATE: 20 BRPM | HEART RATE: 84 BPM | OXYGEN SATURATION: 90 % | SYSTOLIC BLOOD PRESSURE: 164 MMHG | DIASTOLIC BLOOD PRESSURE: 100 MMHG

## 2018-07-15 DIAGNOSIS — J18.9 PNEUMONIA: Primary | ICD-10-CM

## 2018-07-15 LAB
ANION GAP SERPL CALCULATED.3IONS-SCNC: 11 MMOL/L (ref 5–14)
ATRIAL RATE: 80 BPM
BASOPHILS # BLD AUTO: 0.1 THOUSANDS/ΜL (ref 0–0.1)
BASOPHILS NFR BLD AUTO: 1 % (ref 0–1)
BILIRUB UR QL STRIP: NEGATIVE
BUN SERPL-MCNC: 17 MG/DL (ref 5–25)
CALCIUM SERPL-MCNC: 9.3 MG/DL (ref 8.4–10.2)
CHLORIDE SERPL-SCNC: 96 MMOL/L (ref 97–108)
CLARITY UR: CLEAR
CO2 SERPL-SCNC: 37 MMOL/L (ref 22–30)
COLOR UR: NORMAL
CREAT SERPL-MCNC: 0.88 MG/DL (ref 0.7–1.5)
EOSINOPHIL # BLD AUTO: 0.3 THOUSAND/ΜL (ref 0–0.4)
EOSINOPHIL NFR BLD AUTO: 3 % (ref 0–6)
ERYTHROCYTE [DISTWIDTH] IN BLOOD BY AUTOMATED COUNT: 13.5 %
GFR SERPL CREATININE-BSD FRML MDRD: 81 ML/MIN/1.73SQ M
GLUCOSE SERPL-MCNC: 134 MG/DL (ref 70–99)
GLUCOSE UR STRIP-MCNC: NEGATIVE MG/DL
HCT VFR BLD AUTO: 49.8 % (ref 41–53)
HGB BLD-MCNC: 16.2 G/DL (ref 13.5–17.5)
HGB UR QL STRIP.AUTO: NEGATIVE
KETONES UR STRIP-MCNC: NEGATIVE MG/DL
LEUKOCYTE ESTERASE UR QL STRIP: NEGATIVE
LYMPHOCYTES # BLD AUTO: 1.9 THOUSANDS/ΜL (ref 0.5–4)
LYMPHOCYTES NFR BLD AUTO: 16 % (ref 20–50)
MCH RBC QN AUTO: 32.1 PG (ref 26–34)
MCHC RBC AUTO-ENTMCNC: 32.6 G/DL (ref 31–36)
MCV RBC AUTO: 99 FL (ref 80–100)
MONOCYTES # BLD AUTO: 1.2 THOUSAND/ΜL (ref 0.2–0.9)
MONOCYTES NFR BLD AUTO: 10 % (ref 1–10)
NEUTROPHILS # BLD AUTO: 8.4 THOUSANDS/ΜL (ref 1.8–7.8)
NEUTS SEG NFR BLD AUTO: 71 % (ref 45–65)
NITRITE UR QL STRIP: NEGATIVE
P AXIS: 56 DEGREES
PH UR STRIP.AUTO: 8 [PH] (ref 4.5–8)
PLATELET # BLD AUTO: 154 THOUSANDS/UL (ref 150–450)
PMV BLD AUTO: 7.3 FL (ref 8.9–12.7)
POTASSIUM SERPL-SCNC: 4.2 MMOL/L (ref 3.6–5)
PR INTERVAL: 150 MS
PROT UR STRIP-MCNC: NEGATIVE MG/DL
QRS AXIS: 70 DEGREES
QRSD INTERVAL: 84 MS
QT INTERVAL: 382 MS
QTC INTERVAL: 440 MS
RBC # BLD AUTO: 5.04 MILLION/UL (ref 4.5–5.9)
SODIUM SERPL-SCNC: 144 MMOL/L (ref 137–147)
SP GR UR STRIP.AUTO: 1.01 (ref 1–1.04)
T WAVE AXIS: 45 DEGREES
UROBILINOGEN UA: NEGATIVE MG/DL
VENTRICULAR RATE: 80 BPM
WBC # BLD AUTO: 11.9 THOUSAND/UL (ref 4.5–11)

## 2018-07-15 PROCEDURE — 85025 COMPLETE CBC W/AUTO DIFF WBC: CPT | Performed by: EMERGENCY MEDICINE

## 2018-07-15 PROCEDURE — 99284 EMERGENCY DEPT VISIT MOD MDM: CPT

## 2018-07-15 PROCEDURE — 96374 THER/PROPH/DIAG INJ IV PUSH: CPT

## 2018-07-15 PROCEDURE — 93005 ELECTROCARDIOGRAM TRACING: CPT

## 2018-07-15 PROCEDURE — 80048 BASIC METABOLIC PNL TOTAL CA: CPT | Performed by: EMERGENCY MEDICINE

## 2018-07-15 PROCEDURE — 71046 X-RAY EXAM CHEST 2 VIEWS: CPT

## 2018-07-15 PROCEDURE — 94640 AIRWAY INHALATION TREATMENT: CPT

## 2018-07-15 PROCEDURE — 36415 COLL VENOUS BLD VENIPUNCTURE: CPT | Performed by: EMERGENCY MEDICINE

## 2018-07-15 PROCEDURE — 93010 ELECTROCARDIOGRAM REPORT: CPT | Performed by: INTERNAL MEDICINE

## 2018-07-15 RX ORDER — PREDNISONE 20 MG/1
40 TABLET ORAL DAILY
Qty: 10 TABLET | Refills: 0 | Status: SHIPPED | OUTPATIENT
Start: 2018-07-15 | End: 2018-07-20

## 2018-07-15 RX ORDER — METOPROLOL TARTRATE 50 MG/1
25 TABLET, FILM COATED ORAL EVERY 12 HOURS SCHEDULED
COMMUNITY
End: 2018-07-21

## 2018-07-15 RX ORDER — METHYLPREDNISOLONE SODIUM SUCCINATE 125 MG/2ML
125 INJECTION, POWDER, LYOPHILIZED, FOR SOLUTION INTRAMUSCULAR; INTRAVENOUS ONCE
Status: COMPLETED | OUTPATIENT
Start: 2018-07-15 | End: 2018-07-15

## 2018-07-15 RX ORDER — PREDNISONE 20 MG/1
TABLET ORAL
Status: DISPENSED
Start: 2018-07-15 | End: 2018-07-16

## 2018-07-15 RX ORDER — LEVOFLOXACIN 750 MG/1
TABLET ORAL
Status: DISPENSED
Start: 2018-07-15 | End: 2018-07-16

## 2018-07-15 RX ORDER — LEVOFLOXACIN 750 MG/1
750 TABLET ORAL ONCE
Status: COMPLETED | OUTPATIENT
Start: 2018-07-15 | End: 2018-07-15

## 2018-07-15 RX ORDER — ATORVASTATIN CALCIUM 10 MG/1
10 TABLET, FILM COATED ORAL DAILY
COMMUNITY
End: 2019-07-15 | Stop reason: SDUPTHER

## 2018-07-15 RX ORDER — IPRATROPIUM BROMIDE AND ALBUTEROL SULFATE 2.5; .5 MG/3ML; MG/3ML
SOLUTION RESPIRATORY (INHALATION)
Status: DISPENSED
Start: 2018-07-15 | End: 2018-07-15

## 2018-07-15 RX ORDER — LEVOFLOXACIN 750 MG/1
750 TABLET ORAL EVERY 24 HOURS
Qty: 10 TABLET | Refills: 0 | Status: SHIPPED | OUTPATIENT
Start: 2018-07-15 | End: 2018-07-25

## 2018-07-15 RX ORDER — AMLODIPINE BESYLATE 10 MG/1
10 TABLET ORAL DAILY
COMMUNITY
End: 2018-08-02 | Stop reason: ALTCHOICE

## 2018-07-15 RX ORDER — METHYLPREDNISOLONE SODIUM SUCCINATE 125 MG/2ML
INJECTION, POWDER, LYOPHILIZED, FOR SOLUTION INTRAMUSCULAR; INTRAVENOUS
Status: DISPENSED
Start: 2018-07-15 | End: 2018-07-15

## 2018-07-15 RX ORDER — IPRATROPIUM BROMIDE AND ALBUTEROL SULFATE 2.5; .5 MG/3ML; MG/3ML
3 SOLUTION RESPIRATORY (INHALATION)
Status: DISCONTINUED | OUTPATIENT
Start: 2018-07-15 | End: 2018-07-15 | Stop reason: HOSPADM

## 2018-07-15 RX ORDER — PANTOPRAZOLE SODIUM 40 MG/1
40 TABLET, DELAYED RELEASE ORAL DAILY
COMMUNITY
End: 2018-07-21

## 2018-07-15 RX ORDER — BUDESONIDE AND FORMOTEROL FUMARATE DIHYDRATE 160; 4.5 UG/1; UG/1
2 AEROSOL RESPIRATORY (INHALATION) 2 TIMES DAILY
Status: ON HOLD | COMMUNITY
End: 2020-06-16 | Stop reason: SDUPTHER

## 2018-07-15 RX ADMIN — PREDNISONE 50 MG: 20 TABLET ORAL at 14:09

## 2018-07-15 RX ADMIN — IPRATROPIUM BROMIDE AND ALBUTEROL SULFATE 3 ML: .5; 3 SOLUTION RESPIRATORY (INHALATION) at 11:59

## 2018-07-15 RX ADMIN — LEVOFLOXACIN 750 MG: 750 TABLET, FILM COATED ORAL at 14:08

## 2018-07-15 RX ADMIN — METHYLPREDNISOLONE SODIUM SUCCINATE 125 MG: 125 INJECTION, POWDER, FOR SOLUTION INTRAMUSCULAR; INTRAVENOUS at 12:33

## 2018-07-15 NOTE — DISCHARGE INSTRUCTIONS
Neumonía adquirida en la comunidad   LO QUE NECESITA SABER:   La neumonía adquirida en la comunidad (NAC) es sj infección pulmonar que se contrae fuera de un hospital o de un hogar de ancianos  Florin pulmones se inflaman y no pueden funcionar de la [place]  La neumonía adquirida en la comunidad puede ser causada por sj bacteria, un virus o un hongo  INSTRUCCIONES SOBRE EL CAITLIN HOSPITALARIA:   Regrese a la cristian de emergencias si:   · Usted está confundido y no puede pensar con claridad  · Usted tiene más dificultad para respirar  · Florin labios o uñas de las marilynn se tornan grises o [place]  Pregúntele a rosa [place] [employer] vitaminas y minerales son adecuados para usted  · Florin síntomas no mejoran o [place]  · Usted está orinando menos o no [place]  · Usted tiene preguntas o inquietudes acerca de rosa condición o cuidado  Medicamentos:   · Medicamentos,  para tratar sj infección bacterial, viral o causada por hongos  También podrían administrarle un medicamento para dilatar florin bronquios y ayudarle a que respire con mayor facilidad  · [place] florin medicamentos flako se le haya indicado  Consulte con rosa médico si usted ai que rosa medicamento no le está ayudando o si presenta efectos secundarios  Infórmele si es alérgico a cualquier medicamento  Mantenga sj lista actualizada de los [place], las vitaminas y los productos herbales que hanh  Incluya los siguientes datos de los medicamentos: cantidad, frecuencia y motivo de administración  Traiga con usted la lista o los envases de la píldoras a florin citas de seguimiento  Lleve la lista de los medicamentos con usted en michi de sj emergencia  Programe sj abdifatah con rosa médico dentro de 3 días o según indicaciones:  Es posible que deban tomarle otra radiografía  Anote florin preguntas para que se acuerde de hacerlas ramesh florin visitas  Tos y respiración profunda:  La respiración profunda ayuda a abrir las vía aéreas de florin pulmones   El toser [place] a expulsar las flemas de florin pulmones  Respire hondo y contenga el aliento tanto flako pueda  Luego expulse el aire de florin pulmones con sj tos o expectoración jonathon y profunda  Expectore la flema  Hurricane 10 inhalaciones profundas seguidas, sj detrás de la [place], cada hora que usted esté despierto  Recuerde toser después de hacer sj inhalación profunda  No fume ni permita que otras personas fumen a rosa alrededor:  La nicotina y otras sustancias químicas que contienen los cigarrillos y cigarros pueden dañar los pulmones  Pida información a rosa médico si usted actualmente fuma y necesita ayuda para dejar de fumar  Los cigarrillos electrónicos o tabaco sin humo todavía contienen nicotina  Consulte con rosa médico antes de [employer]  Mantenga rosa neumonía adquirida en la comunidad bajo control en rosa hogar:   · Respire aire cálido y húmedo  [place] ayuda a aflojar la flema  [place]oque sin presionar sj toalla pequeña tibia y húmeda sobre rosa Hilda Hurricane and Micaela y rosa boca  Un humidificador de [employer] puede poner humedad en el aire  · [address] se le haya indicado  Pregunte a rosa médico cuánto líquido debe samantha a diario y qué líquidos le recomienda  Los líquidos ayudan a disolver la flema y expulsarla de rosa cuerpo  · Dese golpecitos suaves en el pecho  [place] ayuda a aflojar la flema y hace que sea más fácil toser  Acuéstese boca arriba con la rosemarie más abajo que rosa pecho varias veces al día y golpéese con suavidad el pecho  · Descanse lo suficiente  El descanso ayuda a que rosa cuerpo se recupere  Prevenir el CAP:   · [place] frecuentemente con agua y [place]  Lleve un gel antibacterial con usted  Usted puede usar el gel para limpiar florin marilynn cuando no [place] Mann[place] y agua disponibles  No se toque los ojos, la nariz o la boca a menos que se haya lavado las marilynn gerardo  · Limpie las superficies con frecuencia    [address] cecilia, los muebles de la cocina, teléfonos celulares y otras superficies que la gente toca con frecuencia  · Siempre cubra [place] boca al toser  Tosa en un pañuelo desechable o en la manga de lawton camisa para no contagiar los gérmenes con florin marilynn  · Trate de evitar a las personas que están resfriadas o tienen gripe  Si usted está enfermo, manténgase alejado de otras personas lo más que pueda  · [place] vacunas  Es posible que usted necesite recibir sj vacuna que sirve para prevenir la neumonía  Acuda a que le apliquen la vacuna de la influenza (gripe) cada año tan pronto flako esté disponible  © 2017 [employer] Information is for End User's use only and may not be sold, redistributed or otherwise used for commercial purposes  All illustrations and images included in CareNotes® are the copyrighted property of [employer]  or [employer]  Esta información es sólo para uso en educación  [place] intención no es darle un consejo médico sobre enfermedades o tratamientos  Colsulte con lawton [employer] farmacéutico antes de seguir cualquier régimen médico para saber si es seguro y efectivo para usted  Community Acquired Pneumonia   WHAT YOU NEED TO KNOW:   Community-acquired pneumonia (CAP) is a lung infection that you get outside of a hospital or nursing home setting  Your lungs become inflamed and cannot work well  CAP may be caused by bacteria, viruses, or fungi  DISCHARGE INSTRUCTIONS:   Seek care immediately if:   · You are confused and cannot think clearly  · You have increased trouble breathing  · Your lips or fingernails turn gray or blue  Contact your healthcare provider if:   · Your symptoms do not get better, or they get worse  · You are urinating less, or not at all  · You have questions or concerns about your condition or care  Medicines:   · Medicines  may be given to treat a bacterial, viral, or fungal infection   You may also be given medicines to dilate your bronchial tubes to help you breathe more easily  · Take your medicine as directed  Contact your healthcare provider if you think your medicine is not helping or if you have side effects  Tell him or her if you are allergic to any medicine  Keep a list of the medicines, vitamins, and herbs you take  Include the amounts, and when and why you take them  Bring the list or the pill bottles to follow-up visits  Carry your medicine list with you in case of an emergency  Follow up with your healthcare provider within 3 days or as directed: You may need another x-ray  Write down your questions so you remember to ask them during your visits  Deep breathing and coughing:  Deep breathing helps open the air passages in your lungs  Coughing helps bring up mucus from your lungs  Take a deep breath and hold the breath as long as you can  Then push the air out of your lungs with a deep, strong cough  Spit out any mucus you have coughed up  Take 10 deep breaths in a row every hour that you are awake  Remember to follow each deep breath with a cough  Do not smoke or allow others to smoke around you:  Nicotine and other chemicals in cigarettes and cigars can cause lung damage  Ask your healthcare provider for information if you currently smoke and need help to quit  E-cigarettes or smokeless tobacco still contain nicotine  Talk to your healthcare provider before you use these products  Manage CAP at home:   · Breathe warm, moist air  This helps loosen mucus  Loosely place a warm, wet washcloth over your nose and mouth  A room humidifier may also help make the air moist     · Drink liquids as directed  Ask your healthcare provider how much liquid to drink each day and which liquids to drink  Liquids help make mucus thin and easier to get out of your body  · Gently tap your chest   This helps loosen mucus so it is easier to cough   Lie with your head lower than your chest several times a day and tap your chest      · Get plenty of rest   Rest helps your body heal   Prevent CAP:   · Wash your hands often with soap and water  Carry germ-killing hand gel with you  You can use the gel to clean your hands when soap and water are not available  Do not touch your eyes, nose, or mouth unless you have washed your hands first      · Clean surfaces often  Clean doorknobs, countertops, cell phones, and other surfaces that are touched often  · Always cover your mouth when you cough  Cough into a tissue or your shirtsleeve so you do not spread germs from your hands  · Try to avoid people who have a cold or the flu  If you are sick, stay away from others as much as possible  · Ask about vaccines  You may need a vaccine to help prevent pneumonia  Get an influenza (flu) vaccine every year as soon as it becomes available  © 2017 [employer] Information is for End User's use only and may not be sold, redistributed or otherwise used for commercial purposes  All illustrations and images included in CareNotes® are the copyrighted property of [employer] D A M , Inc  or Brian Fall  The above information is an  only  It is not intended as medical advice for individual conditions or treatments  Talk to your doctor, nurse or pharmacist before following any medical regimen to see if it is safe and effective for you

## 2018-07-15 NOTE — ED NOTES
IV was removed from patient left hand  Patient is getting dress to go home   vitals taken and documented     Diamond Guo  07/15/18 8203

## 2018-07-15 NOTE — ED PROVIDER NOTES
History  Chief Complaint   Patient presents with    Flu Symptoms     I have body aches, a fever for 12 days  worse last night     He can name all 12 his children and their birth dates  However when it comes to his 28 grandchildren states he can't do that  He was 30years old when he  is 68-year-old wife  They have been happily together for 49 years  States he was admitted last year in the ICU for 1 week  History provided by:  Patient and spouse   used: Yes    Cough   Cough characteristics:  Non-productive  Severity:  Moderate  Onset quality:  Sudden  Timing:  Constant  Progression:  Worsening  Chronicity:  New  Smoker: no    Context: not animal exposure    Relieved by:  Nothing  Worsened by:  Nothing  Ineffective treatments:  None tried  Associated symptoms: chills, fever (Subjective tactile) and shortness of breath    Associated symptoms: no chest pain, no diaphoresis, no ear fullness, no ear pain, no eye discharge, no headaches, no myalgias, no rash, no rhinorrhea, no sinus congestion, no sore throat, no weight loss and no wheezing    Fever:     Duration:  10 days    Timing:  Constant    Temp source:  Subjective and tactile    Progression:  Unchanged  Risk factors: recent infection    Risk factors: no chemical exposure and no recent travel    Risk factors comment:  Past medical history of asthma and pneumonia      Prior to Admission Medications   Prescriptions Last Dose Informant Patient Reported? Taking? amLODIPine (NORVASC) 10 mg tablet   Yes Yes   Sig: Take 10 mg by mouth daily   atorvastatin (LIPITOR) 10 mg tablet   Yes Yes   Sig: Take 10 mg by mouth daily   budesonide-formoterol (SYMBICORT) 160-4 5 mcg/act inhaler   Yes Yes   Sig: Inhale 2 puffs 2 (two) times a day Rinse mouth after use     metoprolol tartrate (LOPRESSOR) 50 mg tablet   Yes Yes   Sig: Take 25 mg by mouth every 12 (twelve) hours   pantoprazole (PROTONIX) 40 mg tablet   Yes Yes   Sig: Take 40 mg by mouth daily      Facility-Administered Medications: None       Past Medical History:   Diagnosis Date    Aneurysm, aorta, thoracic ([address] )     AND ABDOMINAL; KT 8/19/17 - FOLLOWS WITH DR MURILLO  PER OP REC FROM JULY 2017  NEEDS REPEAT CT AND ULTRASOUND IN 6 MONTHS  INTERVENTION IF SIZE > 5-51/2CM    COPD (chronic obstructive pulmonary disease) (HCC)     GERD (gastroesophageal reflux disease)     Hypertension        Past Surgical History:   Procedure Laterality Date    HERNIA REPAIR      LEG SURGERY Bilateral        No family history on file  I have reviewed and agree with the history as documented  Social History   Substance Use Topics    Smoking status: Former Smoker    Smokeless tobacco: Not on file      Comment: TOBACCO USE    Alcohol use No        Review of Systems   Constitutional: Positive for chills and fever (Subjective tactile)  Negative for diaphoresis and weight loss  HENT: Negative  Negative for ear pain, rhinorrhea and sore throat  Eyes: Negative  Negative for discharge  Respiratory: Positive for cough and shortness of breath  Negative for wheezing  Cardiovascular: Negative  Negative for chest pain  Gastrointestinal: Negative  Endocrine: Negative  Genitourinary: Negative  Musculoskeletal: Negative  Negative for myalgias  Skin: Negative  Negative for rash  Allergic/Immunologic: Negative  Neurological: Negative  Negative for headaches  Hematological: Negative  Psychiatric/Behavioral: Negative  Physical Exam  Physical Exam   Constitutional: He is oriented to person, place, and time  He appears well-developed and well-nourished  No distress  Nontoxic appearance  Able to lay down fully without difficulty  Phonating without any difficulty  Very pleasant individual    HENT:   Head: Normocephalic and atraumatic     Right Ear: External ear normal    Left Ear: External ear normal    Nose: Nose normal    Mouth/Throat: Oropharynx is clear and moist    Eyes: Conjunctivae and EOM are normal  Pupils are equal, round, and reactive to light  Neck: Normal range of motion  Neck supple  No JVD present  No tracheal deviation present  No thyromegaly present  Cardiovascular: Normal rate, regular rhythm, normal heart sounds and intact distal pulses  Exam reveals no gallop and no friction rub  No murmur heard  Pulmonary/Chest: Effort normal  No stridor  No respiratory distress  He has wheezes (Mild diffuse)  He has no rales  He exhibits no tenderness  Abdominal: Soft  Bowel sounds are normal  He exhibits no distension and no mass  There is no tenderness  There is no rebound and no guarding  No hernia  Genitourinary: Rectal exam shows guaiac negative stool  Musculoskeletal: Normal range of motion  He exhibits no edema, tenderness or deformity  Lymphadenopathy:     He has no cervical adenopathy  Neurological: He is alert and oriented to person, place, and time  He displays normal reflexes  No cranial nerve deficit or sensory deficit  He exhibits normal muscle tone  Coordination normal    Skin: Skin is warm and dry  Capillary refill takes less than 2 seconds  No rash noted  He is not diaphoretic  No erythema  No pallor  Psychiatric: He has a normal mood and affect  His behavior is normal  Judgment and thought content normal    Nursing note and vitals reviewed        Vital Signs  ED Triage Vitals [07/15/18 1043]   Temperature Pulse Respirations Blood Pressure SpO2   98 8 °F (37 1 °C) 82 18 170/95 96 %      Temp Source Heart Rate Source Patient Position - Orthostatic VS BP Location FiO2 (%)   Temporal Monitor Sitting Left arm --      Pain Score       --           Vitals:    07/15/18 1043 07/15/18 1420   BP: 170/95 164/100   Pulse: 82 84   Patient Position - Orthostatic VS: Sitting Sitting       Visual Acuity      ED Medications  Medications   ipratropium-albuterol (DUO-NEB) 0 5-2 5 mg/3 mL inhalation solution 3 mL (3 mL Nebulization Given 7/15/18 1159) methylPREDNISolone sodium succinate (Solu-MEDROL) injection 125 mg (125 mg Intravenous Given 7/15/18 1233)   levofloxacin (LEVAQUIN) tablet 750 mg (750 mg Oral Given 7/15/18 1408)       Diagnostic Studies  Results Reviewed     Procedure Component Value Units Date/Time    UA w Reflex to Microscopic [[id number]]  (Normal) Collected:  07/15/18 1359    Lab Status:  Final result Specimen:  Urine from Urine, Clean Catch Updated:  07/15/18 1440     Color, UA Straw     Clarity, UA Clear     Specific Gravity, UA 1 015     pH, UA 8 0     Leukocytes, UA Negative     Nitrite, UA Negative     Protein, UA Negative mg/dl      Glucose, UA Negative mg/dl      Ketones, UA Negative mg/dl      Bilirubin, UA Negative     Blood, UA Negative     UROBILINOGEN UA Negative mg/dL     Basic metabolic panel [[id number]]  (Abnormal) Collected:  07/15/18 1358    Lab Status:  Final result Specimen:  Blood from Hand, Left Updated:  07/15/18 1420     Sodium 144 mmol/L      Potassium 4 2 mmol/L      Chloride 96 (L) mmol/L      CO2 37 (H) mmol/L      Anion Gap 11 mmol/L      BUN 17 mg/dL      Creatinine 0 88 mg/dL      Glucose 134 (H) mg/dL      Calcium 9 3 mg/dL      eGFR 81 ml/min/1 73sq m     Narrative:         National Kidney Disease Education Program recommendations are as follows:  GFR calculation is accurate only with a steady state creatinine  Chronic Kidney disease less than 60 ml/min/1 73 sq  meters  Kidney failure less than 15 ml/min/1 73 sq  meters      CBC and differential [[id number]]  (Abnormal) Collected:  07/15/18 1232    Lab Status:  Final result Specimen:  Blood from Hand, Left Updated:  07/15/18 1245     WBC 11 90 (H) Thousand/uL      RBC 5 04 Million/uL      Hemoglobin 16 2 g/dL      Hematocrit 49 8 %      MCV 99 fL      MCH 32 1 pg      MCHC 32 6 g/dL      RDW 13 5 %      MPV 7 3 (L) fL      Platelets 219 Thousands/uL      Neutrophils Relative 71 (H) %      Lymphocytes Relative 16 (L) %      Monocytes Relative 10 %      Eosinophils Relative 3 %      Basophils Relative 1 %      Neutrophils Absolute 8 40 (H) Thousands/µL      Lymphocytes Absolute 1 90 Thousands/µL      Monocytes Absolute 1 20 (H) Thousand/µL      Eosinophils Absolute 0 30 Thousand/µL      Basophils Absolute 0 10 Thousands/µL                  XR chest 2 views   Final Result by Haydee Li MD (07/15 1340)      Bibasilar streaky density representing atelectasis and/or pneumonia  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  Workstation performed: [id number]                    Procedures  Procedures       Phone Contacts  ED Phone Contact    ED Course  ED Course as of Jul 15 1541   Sun Jul 15, 2018   1212 Sent chest x-ray films for radiologist to read/interpret  [facility] waiting for the Radiology report on the chest x-ray XR chest 2 views   1334 Been informed of the blood has been hemolyzed  However patient refusing any more blood drug as he was stuck multiple times  [id number] Discussed results with the patient and patient's wife  My states that her  as the whole pharmacy at home "   This includes albuterol neb medication and a nebulizer machine at home  Instructed him to use that every 4 hr the next 4 days  Also encouraged him to call doctor's office tomorrow next day his scheduled visit from July 25th 2 earlier this week  1415 The patient is still resting very comfortably on the stretcher in supine position    Lungs are clear bilaterally                                MDM  Number of Diagnoses or Management Options  Pneumonia:      Amount and/or Complexity of Data Reviewed  Clinical lab tests: ordered and reviewed  Tests in the radiology section of CPT®: ordered and reviewed  Tests in the medicine section of CPT®: ordered and reviewed    Patient Progress  Patient progress: improved    CritCare Time    Disposition  Final diagnoses:   Pneumonia     Time reflects when diagnosis was documented in both MDM as applicable and the Disposition within this note     Time User Action Codes Description Comment    7/15/2018  2:09 PM Marnie Ji Add [J18 9] Pneumonia       ED Disposition     ED Disposition Condition Comment    [facility] to home/self care  Condition at discharge: Stable        Follow-up Information     Follow up With Specialties Details Why Contact Info    Jaylen Chou MD Family Medicine Call For medical recheck of his pneumonia [address]  [address] [facility]  [address]             Discharge Medication List as of 7/15/2018  2:17 PM      START taking these medications    Details   levofloxacin (LEVAQUIN) 750 mg tablet Take 1 tablet (750 mg total) by mouth every 24 hours for 10 days, Starting Sun 7/15/2018, Until Wed 7/25/2018, Print      predniSONE 20 mg tablet Take 2 tablets (40 mg total) by mouth daily for 5 days, Starting Sun 7/15/2018, Until Fri 7/20/2018, Print         CONTINUE these medications which have NOT CHANGED    Details   amLODIPine (NORVASC) 10 mg tablet Take 10 mg by mouth daily, Historical Med      atorvastatin (LIPITOR) 10 mg tablet Take 10 mg by mouth daily, Historical Med      budesonide-formoterol (SYMBICORT) 160-4 5 mcg/act inhaler Inhale 2 puffs 2 (two) times a day Rinse mouth after use , Historical Med      metoprolol tartrate (LOPRESSOR) 50 mg tablet Take 25 mg by mouth every 12 (twelve) hours, Historical Med      pantoprazole (PROTONIX) 40 mg tablet Take 40 mg by mouth daily, Historical Med           No discharge procedures on file      ED Provider  Electronically Signed by           Hazel Carvalho MD  07/15/18 5815

## 2018-07-18 ENCOUNTER — OFFICE VISIT (OUTPATIENT)
Dept: FAMILY MEDICINE CLINIC | Facility: CLINIC | Age: 83
End: 2018-07-18
Payer: COMMERCIAL

## 2018-07-18 VITALS
OXYGEN SATURATION: 92 % | TEMPERATURE: 97.4 F | HEART RATE: 84 BPM | HEIGHT: 63 IN | BODY MASS INDEX: 29.77 KG/M2 | RESPIRATION RATE: 15 BRPM | DIASTOLIC BLOOD PRESSURE: 90 MMHG | WEIGHT: 168 LBS | SYSTOLIC BLOOD PRESSURE: 142 MMHG

## 2018-07-18 DIAGNOSIS — J18.9 PNEUMONIA OF BOTH LUNGS DUE TO INFECTIOUS ORGANISM, UNSPECIFIED PART OF LUNG: Primary | ICD-10-CM

## 2018-07-18 PROCEDURE — 99213 OFFICE O/P EST LOW 20 MIN: CPT | Performed by: PHYSICIAN ASSISTANT

## 2018-07-18 NOTE — ASSESSMENT & PLAN NOTE
- complete courses of prednisone and levaquin   - if patient begins to experience fever, chills, body aches, change in mental status, or respiratory distress, return to hospital

## 2018-07-18 NOTE — PROGRESS NOTES
Assessment/Plan:    Pneumonia of both lungs due to infectious organism  - complete courses of prednisone and levaquin   - if patient begins to experience fever, chills, body aches, change in mental status, or respiratory distress, return to hospital        Diagnoses and all orders for this visit:    Pneumonia of both lungs due to infectious organism, unspecified part of lung        Subjective: Patient presented to [facility] ER on 7/15 with complaints of body aches and subjective fever x 12 days  CXR showed bi-basilar density representing atelectasis and/or pneumonia with suspicious on COPD or emphysema  Patient was discharged with prescription for Levaquin 750 daily x 10 days and a prednisone 20 mg BID x 5 days for wheezing on auscultation  Patient reports that he is feeling much better today  He remains with a cough, but denies fever, chills, body aches, SOB, and wheezing  Patient's pulse ox slightly low at 92% because he did not bring his oxygen with him to his appointment because he was rushing to get here this morning  Patient is not in any respiratory distress  Patient ID: Verdon Osler is a [de-identified] y o  male  HPI    The following portions of the patient's history were reviewed and updated as appropriate: He  has a past medical history of Abdominal pain; Aneurysm, aorta, thoracic (HCC); COPD (chronic obstructive pulmonary disease) ([facility] ); GERD (gastroesophageal reflux disease); Hypertension; Ulcer; and Varicose vein of leg    Patient Active Problem List    Diagnosis Date Noted    Pneumonia of both lungs due to infectious organism 07/18/2018    Varicose veins of both lower extremities with pain 05/23/2018    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[facility] ) 05/23/2018    Chronic respiratory failure with hypoxia ([facility] ) 04/02/2018    Hypertension 02/01/2018    COPD (chronic obstructive pulmonary disease) ([facility] ) 02/01/2018    Descending aortic aneurysm ([facility] ) 02/01/2018    History of DVT (deep vein thrombosis) 02/01/2018    Benign essential hypertension 08/02/2017    Thoracic aortic aneurysm ([address] ) 08/02/2017     He  has a past surgical history that includes Varicose vein surgery; Hernia repair; and Leg Surgery (Bilateral)  His family history includes Cancer in his sister; No Known Problems in his father and mother  He  reports that he has quit smoking  He quit after 35 00 years of use  He has quit using smokeless tobacco  He reports that he does not drink alcohol or use drugs  Current Outpatient Prescriptions   Medication Sig Dispense Refill    acetaminophen (TYLENOL) 325 mg tablet Take 1-2 tablets by mouth every 4 (four) hours as needed      albuterol (2 5 mg/3 mL) 0 083 % nebulizer solution Take 1 vial (2 5 mg total) by nebulization every 6 (six) hours as needed for wheezing or shortness of breath 75 mL 1    albuterol (VENTOLIN HFA) 90 mcg/act inhaler Inhale 2 puffs      amLODIPine (NORVASC) 10 mg tablet Take 10 mg by mouth daily      amLODIPine (NORVASC) 10 mg tablet Take 10 mg by mouth daily      aspirin (ECOTRIN LOW STRENGTH) 81 mg EC tablet Take 81 mg by mouth daily      atorvastatin (LIPITOR) 10 mg tablet Take 1 tablet (10 mg total) by mouth daily 90 tablet 4    atorvastatin (LIPITOR) 10 mg tablet Take 10 mg by mouth daily      budesonide-formoterol (SYMBICORT) 160-4 5 mcg/act inhaler Inhale 2 puffs 2 (two) times a day 1 Inhaler 0    budesonide-formoterol (SYMBICORT) 160-4 5 mcg/act inhaler Inhale 2 puffs 2 (two) times a day Rinse mouth after use        levofloxacin (LEVAQUIN) 750 mg tablet Take 1 tablet (750 mg total) by mouth every 24 hours for 10 days 10 tablet 0    losartan (COZAAR) 100 MG tablet Take 1 tablet by mouth daily      Methylprednisolone 4 MG TBPK Use as directed on package 21 tablet 0    metoprolol tartrate (LOPRESSOR) 50 mg tablet Take 50 mg by mouth every 12 (twelve) hours      metoprolol tartrate (LOPRESSOR) 50 mg tablet Take 25 mg by mouth every 12 (twelve) hours      pantoprazole (PROTONIX) 40 mg tablet Take 1 tablet (40 mg total) by mouth daily 30 tablet 2    pantoprazole (PROTONIX) 40 mg tablet Take 40 mg by mouth daily      predniSONE 20 mg tablet Take 2 tablets (40 mg total) by mouth daily for 5 days 10 tablet 0     No current facility-administered medications for this visit  Current Outpatient Prescriptions on File Prior to Visit   Medication Sig    acetaminophen (TYLENOL) 325 mg tablet Take 1-2 tablets by mouth every 4 (four) hours as needed    albuterol (2 5 mg/3 mL) 0 083 % nebulizer solution Take 1 vial (2 5 mg total) by nebulization every 6 (six) hours as needed for wheezing or shortness of breath    albuterol (VENTOLIN HFA) 90 mcg/act inhaler Inhale 2 puffs    amLODIPine (NORVASC) 10 mg tablet Take 10 mg by mouth daily    amLODIPine (NORVASC) 10 mg tablet Take 10 mg by mouth daily    aspirin (ECOTRIN LOW STRENGTH) 81 mg EC tablet Take 81 mg by mouth daily    atorvastatin (LIPITOR) 10 mg tablet Take 1 tablet (10 mg total) by mouth daily    atorvastatin (LIPITOR) 10 mg tablet Take 10 mg by mouth daily    budesonide-formoterol (SYMBICORT) 160-4 5 mcg/act inhaler Inhale 2 puffs 2 (two) times a day    budesonide-formoterol (SYMBICORT) 160-4 5 mcg/act inhaler Inhale 2 puffs 2 (two) times a day Rinse mouth after use      levofloxacin (LEVAQUIN) 750 mg tablet Take 1 tablet (750 mg total) by mouth every 24 hours for 10 days    losartan (COZAAR) 100 MG tablet Take 1 tablet by mouth daily    Methylprednisolone 4 MG TBPK Use as directed on package    metoprolol tartrate (LOPRESSOR) 50 mg tablet Take 50 mg by mouth every 12 (twelve) hours    metoprolol tartrate (LOPRESSOR) 50 mg tablet Take 25 mg by mouth every 12 (twelve) hours    pantoprazole (PROTONIX) 40 mg tablet Take 1 tablet (40 mg total) by mouth daily    pantoprazole (PROTONIX) 40 mg tablet Take 40 mg by mouth daily    predniSONE 20 mg tablet Take 2 tablets (40 mg total) by mouth daily for 5 days No current facility-administered medications on file prior to visit  He is allergic to lisinopril and penicillins       Review of Systems   Constitutional: Negative for activity change, appetite change, chills, diaphoresis, fatigue and fever  HENT: Positive for rhinorrhea  Negative for congestion, ear pain, postnasal drip, sore throat and tinnitus  Eyes: Negative for discharge  Respiratory: Positive for cough  Negative for shortness of breath and wheezing  Cardiovascular: Negative for chest pain and palpitations  Gastrointestinal: Negative for abdominal pain, nausea and vomiting  Musculoskeletal: Negative for arthralgias and myalgias  Skin: Negative for color change, pallor and rash  Neurological: Negative for headaches  Hematological: Negative for adenopathy  Objective:      /90 (BP Location: Left arm, Patient Position: Sitting, Cuff Size: Adult)   Pulse 84   Temp (!) 97 4 °F (36 3 °C) (Temporal)   Resp 15   Ht 5' 3" (1 6 m)   Wt 76 2 kg (168 lb)   SpO2 92%   BMI 29 76 kg/m²          Physical Exam   Constitutional: He is oriented to person, place, and time  He appears well-developed and well-nourished  No distress  HENT:   Head: Normocephalic and atraumatic  Nose: Nose normal    Mouth/Throat: Oropharynx is clear and moist    Poor dentition    Eyes: Conjunctivae and EOM are normal  Pupils are equal, round, and reactive to light  Neck: Normal range of motion  Neck supple  Cardiovascular: Normal rate, regular rhythm and normal heart sounds  Pulmonary/Chest: Effort normal and breath sounds normal  No respiratory distress  He has no wheezes  No wheezing, crackles, or rales; no anterior or posterior chest wall tenderness on palpation; he is speaking in full sentences in the office and no signs of respiratory distress; oxygen level slightly low because he forgot to bring his oxygen this morning    Musculoskeletal: Normal range of motion     Neurological: He is alert and oriented to person, place, and time  Skin: Skin is warm and dry

## 2018-07-19 RX ORDER — ASPIRIN 81 MG/1
TABLET ORAL EVERY 24 HOURS
COMMUNITY
End: 2018-07-19 | Stop reason: SDUPTHER

## 2018-07-30 DIAGNOSIS — I10 HYPERTENSION, UNSPECIFIED TYPE: Primary | ICD-10-CM

## 2018-08-02 ENCOUNTER — OFFICE VISIT (OUTPATIENT)
Dept: PULMONOLOGY | Facility: CLINIC | Age: 83
End: 2018-08-02
Payer: COMMERCIAL

## 2018-08-02 VITALS
DIASTOLIC BLOOD PRESSURE: 80 MMHG | RESPIRATION RATE: 21 BRPM | HEART RATE: 78 BPM | HEIGHT: 61 IN | OXYGEN SATURATION: 89 % | BODY MASS INDEX: 31.45 KG/M2 | TEMPERATURE: 98.8 F | WEIGHT: 166.6 LBS | SYSTOLIC BLOOD PRESSURE: 118 MMHG

## 2018-08-02 DIAGNOSIS — J44.9 CHRONIC OBSTRUCTIVE PULMONARY DISEASE, UNSPECIFIED COPD TYPE (HCC): Primary | ICD-10-CM

## 2018-08-02 DIAGNOSIS — J96.11 CHRONIC RESPIRATORY FAILURE WITH HYPOXIA (HCC): ICD-10-CM

## 2018-08-02 DIAGNOSIS — J18.9 PNEUMONIA OF BOTH LUNGS DUE TO INFECTIOUS ORGANISM, UNSPECIFIED PART OF LUNG: ICD-10-CM

## 2018-08-02 DIAGNOSIS — R60.0 EDEMA OF BOTH LEGS: ICD-10-CM

## 2018-08-02 PROBLEM — R06.83 SNORING: Status: ACTIVE | Noted: 2018-08-02

## 2018-08-02 PROCEDURE — G0009 ADMIN PNEUMOCOCCAL VACCINE: HCPCS | Performed by: INTERNAL MEDICINE

## 2018-08-02 PROCEDURE — 99214 OFFICE O/P EST MOD 30 MIN: CPT | Performed by: INTERNAL MEDICINE

## 2018-08-02 PROCEDURE — 90732 PPSV23 VACC 2 YRS+ SUBQ/IM: CPT | Performed by: INTERNAL MEDICINE

## 2018-08-02 RX ORDER — FUROSEMIDE 20 MG/1
20 TABLET ORAL DAILY
Qty: 5 TABLET | Refills: 0 | Status: SHIPPED | OUTPATIENT
Start: 2018-08-02 | End: 2018-12-31 | Stop reason: HOSPADM

## 2018-08-02 NOTE — PROGRESS NOTES
Progress note - Pulmonary Medicine   Lillie Acosta [de-identified] y o  male MRN: [id number]       Impression & Plan:     COPD (chronic obstructive pulmonary disease) ([facility] [address] )   Continue Symbicort b i d , I explained to the patient and his son how to use it  He will continue to rinse his mouth after each use  He does not need any refills at this time  I gave patient a sample of Incruse to use once daily and evaluate his symptoms and if he feels better than he will call to order either Incruse or Spiriva  At this time I do not see any benefit of doing PFTs but I may consider in the future  Patient has chronic wheezing and apparently has advanced COPD and I will try to maximize his treatment  I told patient and his son about side effects of  LAMA  Including glaucoma and urinary retention and he will monitor  For any signs of these complications  I believe patient has a component of chronic diastolic CHF given his edema and his echocardiogram, I gave him prescription of Lasix 20 mg once daily and told him to monitor his weight every morning and to monitor his edema, and if he feels better clinically then he will call his primary care physician to get further Lasix and also to monitor his BMP  After the trial of Lasix for 5 days if no improvement then he will add includes so he can  Recognize any improvement in his symptoms    Chronic respiratory failure with hypoxia ([facility] [address] )   He will continues oxygen at 2 liters/minute  Based on his elevated bicarb on Chem 7 I suspect chronic hypercapnic respiratory failure as well  Will consider ABG in the future but patient is not concerning using any CPAP or BiPAP at all as below  Pneumonia of both lungs due to infectious organism    Not sure if patient had pneumonia but he was treated successfully few weeks ago  No further workup needed at this time  Patient is up-to-date with Prevnar 13, I will give him a Pneumovax 23 this visit      Edema of both legs    Again I believe patient has chronic diastolic dysfunction / CHF and as mentioned above I will give him a trial of Lasix 20 mg daily for 5 days and if he feels better on that then he will contact his PCP for further management  Snoring    Patient has stigmata of JAY and possible OHS but after counseling today he declined sleep study as he does not want to use BiPAP or CPAP in the future  We will discuss that again in the future if symptoms are worse       I went over the above plan multiple times during this visit with the son who was interpreting to his father, the patient lives with his son and so the some will take care of explaining the plan again and also how to use the medications which I explained to the son as well  They verbalized understanding   ______________________________________________________________________    HPI:    Hannah Ascencio presents today for follow-up of   COPD and chronic hypoxic respiratory failure  Patient is a former smoker and he has COPD based on symptoms with chronic wheezing and intermittent nonproductive cough and dyspnea on exertion, he denies any chest pain or fever or chills or night sweats, denies any weight loss, he denies any exacerbation of his COPD in the past several months  Few weeks ago he had some morning fever as he states and he was diagnosed with pneumonia based on chest x-ray and was treated with Levaquin course and he feels better  He also complains of legs edema chronically but currently not on any diuretics  He lives with his family and he is on oxygen continuously at 2 liters/minute  According to the son patient has significant snoring and excessive daytime sleepiness  He never had workup of sleep apnea in the past   He denies any history of urinary retention or prostate problem  Currently he is on Symbicort only and p r n  Albuterol which he does not need frequently  Review of Systems:  Review of Systems   Constitutional: Negative      HENT: Negative  Eyes: Negative  Respiratory:        AS hpi   Cardiovascular: Negative  Gastrointestinal: Negative  Endocrine: Negative  Genitourinary: Negative  Musculoskeletal: Negative  Skin: Negative  Allergic/Immunologic: Negative  Neurological: Negative  Hematological: Negative  Psychiatric/Behavioral: Negative  Past medical history, surgical history, and family history were reviewed and updated as appropriate    Social history updates:  History   Smoking Status    Former Smoker    Packs/day: 0 25    Years: 35 00    Start date: 1966    Quit date: 2001   Smokeless Tobacco    Former User     Comment: TOBACCO USE       PhysicalExamination:  Vitals:   /80 (BP Location: Left arm, Patient Position: Sitting, Cuff Size: Standard)   Pulse 78   Temp 98 8 °F (37 1 °C) (Tympanic)   Resp 21   Ht 5' 1" (1 549 m)   Wt 75 6 kg (166 lb 9 6 oz)   SpO2 (!) 89%   BMI 31 48 kg/m²      General: alert, not in acute distress  HEENT: PERRL, no icteric sclera or cyanosis, no thrush  Neck:  Supple, no lymphadenopathy or thyromegaly, no JVD  Lungs:  Equal breath sounds and  Bilateral mild wheezing  Heart: S1S2 regular, no murmures or gallops  Abdomen: soft, non-tender, bowel sounds  present  Extrimities: + edema left more than right, no clubbing or cyanosis  Neuro: Alert and oriented x 3, no focal neurodeficits   Skin: intact, no rashes    Diagnostic Data:  Labs:   I personally reviewed the most recent laboratory data pertinent to today's visit    Lab Results   Component Value Date    WBC 11 90 (H) 07/15/2018    HGB 16 2 07/15/2018    HCT 49 8 07/15/2018    MCV 99 07/15/2018     07/15/2018     Lab Results   Component Value Date    GLUCOSE 134 (H) 07/15/2018    CALCIUM 9 3 07/15/2018     07/15/2018    K 4 2 07/15/2018    CO2 37 (H) 07/15/2018    CL 96 (L) 07/15/2018    BUN 17 07/15/2018    CREATININE 0 88 07/15/2018     No results found for: IGE  No results found for: ALT, AST, GGT, ALKPHOS, BILITOT    Imaging:  I personally reviewed the images on the [facility] system pertinent to today's visit   chest x-ray in July 2018 reviewed on PACs:  Emphysematous changes with hyperinflated lungs, possible vascular congestion and bibasilar atelectasis    Other studies:  Echocardiogram 2017: LVEF 73%, grade 1 diastolic dysfunction, normal RV    Ilana Salazar MD

## 2018-08-02 NOTE — ASSESSMENT & PLAN NOTE
Again I believe patient has chronic diastolic dysfunction / CHF and as mentioned above I will give him a trial of Lasix 20 mg daily for 5 days and if he feels better on that then he will contact his PCP for further management

## 2018-08-02 NOTE — ASSESSMENT & PLAN NOTE
Not sure if patient had pneumonia but he was treated successfully few weeks ago  No further workup needed at this time  Patient is up-to-date with Prevnar 13, I will give him a Pneumovax 23 this visit

## 2018-08-02 NOTE — ASSESSMENT & PLAN NOTE
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

## 2018-08-02 NOTE — ASSESSMENT & PLAN NOTE
Patient has stigmata of JAY and possible OHS but after counseling today he declined sleep study as he does not want to use BiPAP or CPAP in the future    We will discuss that again in the future if symptoms are worse

## 2018-08-02 NOTE — ASSESSMENT & PLAN NOTE
Continue Symbicort b i d , I explained to the patient and his son how to use it  He will continue to rinse his mouth after each use  He does not need any refills at this time  I gave patient a sample of Incruse to use once daily and evaluate his symptoms and if he feels better than he will call to order either Incruse or Spiriva  At this time I do not see any benefit of doing PFTs but I may consider in the future  Patient has chronic wheezing and apparently has advanced COPD and I will try to maximize his treatment  I told patient and his son about side effects of  LAMA  Including glaucoma and urinary retention and he will monitor  For any signs of these complications  I believe patient has a component of chronic diastolic CHF given his edema and his echocardiogram, I gave him prescription of Lasix 20 mg once daily and told him to monitor his weight every morning and to monitor his edema, and if he feels better clinically then he will call his primary care physician to get further Lasix and also to monitor his BMP    After the trial of Lasix for 5 days if no improvement then he will add includes so he can  Recognize any improvement in his symptoms

## 2018-08-06 RX ORDER — LOSARTAN POTASSIUM 100 MG/1
100 TABLET ORAL DAILY
Qty: 90 TABLET | Refills: 0 | Status: SHIPPED | OUTPATIENT
Start: 2018-08-06 | End: 2018-12-03 | Stop reason: SDUPTHER

## 2018-08-20 DIAGNOSIS — K21.9 GASTROESOPHAGEAL REFLUX DISEASE WITHOUT ESOPHAGITIS: ICD-10-CM

## 2018-08-20 RX ORDER — PANTOPRAZOLE SODIUM 40 MG/1
40 TABLET, DELAYED RELEASE ORAL DAILY
Qty: 90 TABLET | Refills: 1 | Status: ON HOLD | OUTPATIENT
Start: 2018-08-20 | End: 2020-06-16 | Stop reason: SDUPTHER

## 2018-08-29 ENCOUNTER — HOSPITAL ENCOUNTER (EMERGENCY)
Facility: HOSPITAL | Age: 83
Discharge: HOME | End: 2018-08-29
Attending: EMERGENCY MEDICINE | Admitting: EMERGENCY MEDICINE
Payer: COMMERCIAL

## 2018-08-29 ENCOUNTER — APPOINTMENT (EMERGENCY)
Dept: RADIOLOGY | Facility: HOSPITAL | Age: 83
End: 2018-08-29
Payer: COMMERCIAL

## 2018-08-29 VITALS
BODY MASS INDEX: 30.99 KG/M2 | OXYGEN SATURATION: 98 % | WEIGHT: 164 LBS | TEMPERATURE: 97.9 F | DIASTOLIC BLOOD PRESSURE: 99 MMHG | HEART RATE: 60 BPM | SYSTOLIC BLOOD PRESSURE: 160 MMHG | RESPIRATION RATE: 20 BRPM

## 2018-08-29 DIAGNOSIS — I10 HYPERTENSION: ICD-10-CM

## 2018-08-29 DIAGNOSIS — J44.1 COPD WITH ACUTE EXACERBATION (HCC): Primary | ICD-10-CM

## 2018-08-29 LAB
ALBUMIN SERPL BCP-MCNC: 3.8 G/DL (ref 3–5.2)
ALP SERPL-CCNC: 67 U/L (ref 43–122)
ALT SERPL W P-5'-P-CCNC: 25 U/L (ref 9–52)
ANION GAP SERPL CALCULATED.3IONS-SCNC: 4 MMOL/L (ref 5–14)
AST SERPL W P-5'-P-CCNC: 42 U/L (ref 17–59)
BASOPHILS # BLD AUTO: 0.1 THOUSANDS/ΜL (ref 0–0.1)
BASOPHILS NFR BLD AUTO: 1 % (ref 0–1)
BILIRUB SERPL-MCNC: 0.8 MG/DL
BUN SERPL-MCNC: 18 MG/DL (ref 5–25)
CALCIUM SERPL-MCNC: 8.8 MG/DL (ref 8.4–10.2)
CHLORIDE SERPL-SCNC: 100 MMOL/L (ref 97–108)
CO2 SERPL-SCNC: 38 MMOL/L (ref 22–30)
CREAT SERPL-MCNC: 0.89 MG/DL (ref 0.7–1.5)
EOSINOPHIL # BLD AUTO: 0.6 THOUSAND/ΜL (ref 0–0.4)
EOSINOPHIL NFR BLD AUTO: 10 % (ref 0–6)
ERYTHROCYTE [DISTWIDTH] IN BLOOD BY AUTOMATED COUNT: 14.5 %
GFR SERPL CREATININE-BSD FRML MDRD: 80 ML/MIN/1.73SQ M
GLUCOSE SERPL-MCNC: 101 MG/DL (ref 70–99)
HCT VFR BLD AUTO: 50.7 % (ref 41–53)
HGB BLD-MCNC: 16.4 G/DL (ref 13.5–17.5)
LYMPHOCYTES # BLD AUTO: 1 THOUSANDS/ΜL (ref 0.5–4)
LYMPHOCYTES NFR BLD AUTO: 15 % (ref 20–50)
MCH RBC QN AUTO: 32.1 PG (ref 26–34)
MCHC RBC AUTO-ENTMCNC: 32.4 G/DL (ref 31–36)
MCV RBC AUTO: 99 FL (ref 80–100)
MONOCYTES # BLD AUTO: 0.5 THOUSAND/ΜL (ref 0.2–0.9)
MONOCYTES NFR BLD AUTO: 8 % (ref 1–10)
NEUTROPHILS # BLD AUTO: 4.4 THOUSANDS/ΜL (ref 1.8–7.8)
NEUTS SEG NFR BLD AUTO: 67 % (ref 45–65)
PLATELET # BLD AUTO: 159 THOUSANDS/UL (ref 150–450)
PMV BLD AUTO: 7.3 FL (ref 8.9–12.7)
POTASSIUM SERPL-SCNC: 4.9 MMOL/L (ref 3.6–5)
PROT SERPL-MCNC: 7.4 G/DL (ref 5.9–8.4)
RBC # BLD AUTO: 5.12 MILLION/UL (ref 4.5–5.9)
SODIUM SERPL-SCNC: 142 MMOL/L (ref 137–147)
TROPONIN I SERPL-MCNC: <0.01 NG/ML (ref 0–0.03)
WBC # BLD AUTO: 6.6 THOUSAND/UL (ref 4.5–11)

## 2018-08-29 PROCEDURE — 71046 X-RAY EXAM CHEST 2 VIEWS: CPT

## 2018-08-29 PROCEDURE — 93005 ELECTROCARDIOGRAM TRACING: CPT

## 2018-08-29 PROCEDURE — 84484 ASSAY OF TROPONIN QUANT: CPT | Performed by: PHYSICIAN ASSISTANT

## 2018-08-29 PROCEDURE — 80053 COMPREHEN METABOLIC PANEL: CPT | Performed by: EMERGENCY MEDICINE

## 2018-08-29 PROCEDURE — 94640 AIRWAY INHALATION TREATMENT: CPT

## 2018-08-29 PROCEDURE — 85025 COMPLETE CBC W/AUTO DIFF WBC: CPT | Performed by: PHYSICIAN ASSISTANT

## 2018-08-29 PROCEDURE — 36415 COLL VENOUS BLD VENIPUNCTURE: CPT | Performed by: PHYSICIAN ASSISTANT

## 2018-08-29 PROCEDURE — 99284 EMERGENCY DEPT VISIT MOD MDM: CPT

## 2018-08-29 RX ORDER — METOPROLOL TARTRATE 50 MG/1
50 TABLET, FILM COATED ORAL ONCE
Status: COMPLETED | OUTPATIENT
Start: 2018-08-29 | End: 2018-08-29

## 2018-08-29 RX ORDER — IPRATROPIUM BROMIDE AND ALBUTEROL SULFATE 2.5; .5 MG/3ML; MG/3ML
3 SOLUTION RESPIRATORY (INHALATION)
Status: DISCONTINUED | OUTPATIENT
Start: 2018-08-29 | End: 2018-08-29 | Stop reason: HOSPADM

## 2018-08-29 RX ORDER — IPRATROPIUM BROMIDE AND ALBUTEROL SULFATE 2.5; .5 MG/3ML; MG/3ML
SOLUTION RESPIRATORY (INHALATION)
Status: COMPLETED
Start: 2018-08-29 | End: 2018-08-29

## 2018-08-29 RX ORDER — METOPROLOL TARTRATE 50 MG/1
TABLET, FILM COATED ORAL
Status: COMPLETED
Start: 2018-08-29 | End: 2018-08-29

## 2018-08-29 RX ORDER — AMLODIPINE BESYLATE 10 MG/1
10 TABLET ORAL
COMMUNITY
End: 2018-09-04 | Stop reason: SDUPTHER

## 2018-08-29 RX ORDER — AZITHROMYCIN 250 MG/1
TABLET, FILM COATED ORAL
Qty: 6 TABLET | Refills: 0 | Status: SHIPPED | OUTPATIENT
Start: 2018-08-29 | End: 2018-09-02

## 2018-08-29 RX ADMIN — METOPROLOL TARTRATE 50 MG: 50 TABLET, FILM COATED ORAL at 11:36

## 2018-08-29 RX ADMIN — IPRATROPIUM BROMIDE AND ALBUTEROL SULFATE 3 ML: 2.5; .5 SOLUTION RESPIRATORY (INHALATION) at 11:06

## 2018-08-29 RX ADMIN — METOPROLOL TARTRATE 50 MG: 50 TABLET ORAL at 11:36

## 2018-08-29 NOTE — DISCHARGE INSTRUCTIONS
Exacerbación de la EPOC, cuidados ambulatorios   INFORMACIÓN GENERAL:   La exarcerbación de la EPOC (enfermedad pulmonar obstructiva crónica)  es sj reagudización o empeoramiento de la EPOC  Síntomas comunes incluyen los siguientes:   · Falta de aliento    · Tos seca    · Crisis de tos que viene de florin pulmones con mucosidad    · Sibilancia (respiración dificultosa con un fabien de silbido en el pecho) y opresión en rosa pecho  Busque cuidados inmediatos para los siguientes síntomas:   · Confusión, mareos o desvanecimiento    · Brazo o pierna elma, inflamada o caliente    · Falta de aliento o dolor de pecho    · Toser jeanette  El tratamiento para la exarcerbación de la EPOC  posiblemente incluye medicamentos para ayudar a disminuir la inflamación y la hinchazón en florin pulmones  Los medicamentos también pueden ayudar a abrir florin vías respiratorias o tratar Pitney Sameer  Es probable que usted necesite de rehabilitación pulmonar para ayudarle a controlar florin síntomas y mejorar rosa calidad de janet  Es probable que usted necesite de oxígeno adicional para ayudarle a respirar más fácilmente  Prevenga otra exarcerbación:   · No fume y evite a los que fuman  Si usted fuma, nunca es tarde para dejarlo  Es posible que también tenga menos exacerbaciones  Solicite información [employer] y programas de apoyo que le pueden ayudar para dejar de fumar  · Kelsey pendiente y evite las cosas que empeoran florin síntomas  El clima frío y los cambios bruscos de temperatura pueden desencadenar sj exacerbación  El humo que expiden los vehículos y los químicos, la contaminación del aire, y los perfumes también pueden aumentar florin síntomas  · Use la respiración con los labios fruncidos cada vez que sienta que le falta el aire  Respire profundo tomando el aire por rosa nariz  Exhale lentamente por la boca con los labios ligeramente fruncidos tomándose el doble de tiempo que le zhen inhalar el aire   Meriam Beckemeyer también puede practicar pam patrón de respiración mientras se dobla, levanta algo, sube las escaleras o hace ejercicio  [place] disminuye la respiración y lo ayuda a movilizar el aire dentro y fuera de florin pulmones  · Ejercítese por lo menos por 20 minutos cada día  El ejercicio puede ayudarle a aumentar pruitt energía y disminuir pruitt falta de aliento  Pregunte sobre el mejor plan de ejercicios para usted  · Prevenga infecciones que pueden ser peligrosas cuando usted tiene EPOC  Reciba sj vacuna contra la gripe cada año tan pronto flako estén disponibles  Pregúntele a pruitt médico si usted debería también aplicarse otras vacunas, flako las que se dan para prevenir la neumonía y [place]  Evite el contacto con personas que estén enfermas, y [place] las marilynn con frecuencia  Programe sj abdifatah con pruitt proveedor de prateek según indicaciones:  Anote florin preguntas para que las recuerde ramesh florin citas de seguimiento  ACUERDOS SOBRE PRUITT CUIDADO:   Usted tiene el derecho de participar en la planificación de pruitt cuidado  Aprenda todo lo que pueda sobre pruitt condición y flako darle tratamiento  Discuta con florin médicos florin opciones de tratamiento para juntos decidir el cuidado que usted quiere recibir  Usted siempre tiene el derecho a rechazar pruitt tratamiento  Esta información es sólo para uso en educación  Pruitt intención no es darle un consejo médico sobre enfermedades o tratamientos  Colsulte con pruitt Dory[employer] farmacéutico antes de seguir cualquier régimen médico para saber si es seguro y efectivo para usted  © 2014 [address] is for End User's use only and may not be sold, redistributed or otherwise used for commercial purposes  All illustrations and images included in CareNotes® are the copyrighted property of A D A M , Inc  or Brian Fall        Dieta baja en sodio   LO QUE NECESITA SABER:   Sj dieta baja en sodio limita el consumo de alimentos que tienen alto contenido de sodio (sal)  Usted tendrá que seguir sj dieta baja en sodio si padece de presión arterial caitlin, enfermedad del riñón o insuficiencia cardíaca  Es posible también que tenga que seguir esta dieta si padece sj condición que hace que rosa cuerpo retenga líquidos  Es posible que deba limitar la cantidad de sodio que consume a 1500 mg  Pregúntele a rosa médico cuánto sodio puede consumir por día  INSTRUCCIONES SOBRE [facility]:   Cómo usar las etiquetas de los alimentos para escoger los que son bajos en sodio:  Ankita las etiquetas de los alimentos para encontrar la cantidad de sodio que contienen  La cantidad de sodio está incluida en miligramos (mg)  La columna del porcentaje de valor diario indica la cantidad de necesidades diarias satisfechas con 1 porción del alimento para cada nutriente en la lista  Escoja alimentos que tengan menos de 5% del porcentaje diario de Cmgee  Estos alimentos se consideran bajos en sodio  Los alimentos que tienen 20% o más del porcentaje diario de sodio se consideran alimentos altos en sodio  Algunas etiquetas de alimentos podrían también incluir cualquiera de los siguientes términos que indican el contenido de sodio en los alimentos:  · Leonardo de sodio:  Menos de 5 mg de sodio en cada porción    · Sodio muy bajo:  35 mg de sodio o menos por porción    · Bajo sodio:  140 mg de sodio o menos por porción    · Sodio reducido:  Por lo menos 25% menos de sodio en cada porción que el tipo regular    · Richa en sodio:  50% menos de sodio en cada porción    · Sin sal o sin sal adicional:  No se ha agregado sal adicional ramesh el procesamiento de los alimentos (el alimento en sí aún podría contener sodio)  Alimentos que se deben evitar:  Los alimentos salados son altos en sodio   Se debe evitar lo siguiente:  · Alimentos procesados:      ¨ Mezclas para hacer pan de maíz, panecitos, pastel, y pudin     [place] [address] instantáneos, flako arron, cereales, macarrones y arroz     ¨ Alimentos empacados, flako relleno de pan, mezclas para preparar arroz y pastas, mezclas para preparar dips, y macarrones con queso     ¨ Alimentos enlatados, flako vegetales enlatados, sopas, consomés, salsas, y [place] de vegetales o tomate    ¨ Alimentos para merendar, flako arron tostadas, palomitas de maíz, pretzels, piel de cerdo, galletas de soda [place], y nueces saladas    ¨ Alimentos congelados, flako cenas, entradas, vegetales en salsa, y catrina empanizadas    ¨ Sauerkraut, vegetales en escabeche, y otros alimentos preparados con vinagre    · Catrina y quesos:      ¨ Catrina ahumadas o curadas, flako carne preparada con [place], tocineta, jamón, perros calientes, y salchichas    ¨ Catrina o pastas enlatadas, flako catrina preparadas en ollas de arcilla, jose antonio, anchoas, e imitación de [address] [id number] [facility] para emparedado, flako [place], [place], [place], y carne en rebanada    ¨ Queso procesado, flako queso americano y pastas de queso    · Condimentos, salsas y especias:      ¨ Amado (¼ de cucharadita de amado contiene 575 mg de sodio)    ¨ Especias hechas con amado, flako sal de ajo, sal de apio, sal de cebolla y sal con condimentos    ¨ Salsa de soya regular, salsa de [facility], [address] [facility], salsa para [facility], [place] [employer], y la mayoría de las vinagres con sabor    ¨ Salsa enlatada para catrina y mezclas enlatadas     ¨ Condimentos regulares, flako la mostaza, la salsa de [place], y los aderezos para ensalada    ¨ Pepinillos y aceitunas    ¨ Ablandadores de catrina y glutamato de monosodio  Alimentos que puede incluir:  Ankita las etiquetas de los alimentos para encontrar la cantidad de sodio de cada porción  · Deras y cereal:  Trate de elegir panes con menos de 80 mg de sodio por porción       ¨ Pan, panecillo, pan estilo roland, tortilla o galletas sin sal     ¨ Cereales preparados con menos de 5% del valor diario de sodio (por ejemplo, heather triturado y arroz inflado)    ¨ Pasta    · Verduras y frutas:      ¨ Vegetales frescos sin sal, congelados o enlatados    ¨ Frutas frescas, congeladas o enlatadas    ¨ Jugo de frutas    · Productos lácteos:  Sj porción tiene aproximadamente 150 mg de sodio  Fronmare Yañez, todos los tipos    ¨ Yogur    ¨ Queso yaw, flako queso cheddar, suizo, [employer] o mozzarella    · Rebeccaside y otros alimentos con proteína:  Algunas catrina crudas Hardeep Pierini sodio extra  ¨ Catrina sin aditivos, pescado y carne de ave     ¨ Huevo    · Otros alimentos:      ¨ Pudín casero    ¨ [place] sin sal, palomitas de maíz o galletas saladas    ¨ [place] o margarina sin sal  Modos de disminuir el consumo de sodio:   · Agregue hierbas y especias a los alimentos en lugar de sal ramesh la cocción  Utilice condimentos sin sal para agregar sabor a los alimentos  Por ejemplo: cebolla en polvo, ajo en polvo, [place], griffin en polvo, pimentón y perejil  Use jugo de lima, anju o vinagre para darle a los alimentos un sabor ácido  Use chiles picantes, khloe o khloe de [place] para agregar un sabor picante a los alimentos  · No ponga el salero en la sandoval de la cocina  [place] puede evitar que añada sal a los alimentos en la sandoval  Puede llevar un tiempo acostumbrarse a disfrutar el sabor natural de los alimentos en lugar de agregar sal  Consulte con rosa médico antes de usar sustitutos de la sal  Algunos sustitutos de la sal tienen sj ryan cantidad de potasio y deben evitarse si usted tiene sj enfermedad en los riñones  · Escoja alimentos bajos en sodio en los restaurantes  Las comidas de los restaurantes ruby siempre son altas en sodio  Algunos restaurantes ofrecen información nutricional en el menú que indica la cantidad de sodio de florin alimentos  De ser posible, pida que preparen rosa comida con menos sal o sin sal      · Compre alimentos y aperitivos sin sal o bajos en sodio en el supermercado  Por ejemplo: caldos, sopas y vegetales enlatados sin rajesh o bajos en sodio  Elija verduras frescas o congeladas en rosa lugar   Almarie Blank nueces o semillas sin sal o frutas o verduras frescas flako bocadillos  Ankita las etiquetas de los alimentos y elija los alimentos sin sal o con bajo o muy bajo contenido de [place]  © 2017 [address] Information is for End User's use only and may not be sold, redistributed or otherwise used for commercial purposes  All illustrations and images included in CareNotes® are the copyrighted property of A D A M , Inc  or Brian Fall  Esta información es sólo para uso en educación  [place] intención no es darle un consejo médico sobre enfermedades o tratamientos  Colsulte con lawton Ozell Fabry farmacéutico antes de seguir cualquier régimen médico para saber si es seguro y efectivo para usted  Sazonando sin sal   LO QUE NECESITA SABER:   Sazonar sin sal cuando cocina y come los alimentos puede ayudar disminuir la cantidad de sodio en lawton Nai Ards  El sodio se encuentra en la sal y en muchos otros alimentos  Limite el sodio si tiene presión arterial caitlin e insuficiencia cardiaca  Usted también deberá limitar el sodio si tiene problemas de hígado o enfermedad del [place]  El sodio hace que lawton presión arterial suba si usted tiene presión arterial caitlin  Si usted tiene cheyanne cardiaca, comer mucho sodio le causará retención de líquido en el cuerpo  Kelsey líquido extra en lawton cuerpo puede causar hinchazón, falta de aliento, o ganancia de [place]  Las personas con otras condiciones médicas flako diabetes o enfermedad cardiaca también necesitarán hacer otros cambios en la dieta  Consulte a lawton médico si necesita hacer otros cambios en la dieta  Diya Hernandez [facility]:   Condimentos y sazonadores con alto contenido de sodio que debe limitar o evitar:   · El aderezo [place], [place], y otras mezclas de salsas empaquetadas  · Salsas de barbacoa, tacos, y [place]  · Mezclas de aderezos para ensaladas  · Amado de ajo, Eileen Rocher, y apio  · Trozos de tocino artificiales      · Ablandadores y salsas para carne  · Glutamato monosódio ([address])  El GMS puede encontrarse en comida [place], salsa de soya, y salsa de ostión  · Mostaza, salsa de rábano picante y ketchup  · Salsa de vinagre  · Amado, amado sazonada, sal kosher, y sal chandler  · Soya, [place], y [address] [place] [address] de [place]  Limite las variedades bajas en amado ya que aún contienen altas cantidades de Mcgee  · Salsa tártara, de pescado, y coctel  Hierbas con bajo contenido de sodio que puede utilizar:   · [place] con SANDEFBLANCARD, pescado y [address], carne de res, hígado, ternera, salsa de tomata, sopas, pasta, ensalada clay[place] y vegetales  · Sasha con carne de res, pescado canales, sopas y platillos con [address]  · Cilantro, chile en polvo, y comino con platillos con [place], comida mexicana, cerdo, pescado y arroz  · Hierba de dill con panes, seth, vegetales frescos cocidos, pepinos, pescado o mariscos, ensalada de arron y sopa  · Mejorana con carne de res, kunz, seth, pavo, pasta, ensalada clayton, salsa cremosa, huevos, sopa y vegetales  · [place]re[place]il con relleno, arroz, huevo, ensalada clayton, frijoles fritos, vegetales, sopas, y salsas de [address]  · Donalda Olp y tomillo con carne ternera, cerdo, res, arron, salsa cremosa o de tomate, sopas y vegetales  · Salvia con seth, pavo, pescado, cerdo, ternera, sopas, cebollas, relleno, salsa de tomate y vegetales  · Saborizante con res, relleno, consome de seth, ejotes, aves, [place] otto y arron  · Estragón con huevos, pescado o mariscos, seth, [place], [address] [facility], sopas, salsas y [place]  Mezclas de hierbas con bajo contenido de sodio que puede utilizar:   · Preparado de chili: mezcla la khloe deana, chili en polvo, cilantro, cominos, mostaza seca, ajo en polvo, orégano y páprika  · Mezcla de col: mezcle las semillas de apio, hierba de dill, cebolla seca, azúcar y estragón      · Mezcla italiana:  mezcle la [place], khloe deana, ajo en polvo, pimiento Algernon [place], mejorana, orégano, saborizante, y tomillo  · Mezcla de cebolla con hierbas:  mezcle la [place], khloe deana, cominos, eneldo, hojuelas de cebolla seca y ajo en polvo  Especias con bajo contenido de sodio que puede utilizar:   · [place] en flanes y budines, panes dulces, arrollados, frutas, ensalada de frutas, cerdo, calabaza, zapallo y batatas  · Clavos en pan mo, fruta, jamón, puerco, frijoles fritos, tomates, arron dulces  · Rey Melendez con res, ternera, seth, pavo, y pescado o sopa de papa  · Kamari con pescado al horno, zanahorias, carne de [facility], [place], [place], [place], arroz y fruta  · Rea en consomé de seth, postres de frutas horneadas, zanahorias, coliflor, flan, mermelada de frutas, kunz, arron y calabaza  · Nuez moscada en panificaciones dulces, frutas, verduras y flan  Sazonadores con bajo contenido de sodio que puede utilizar:   · [employer], pasta, sopa crema o de papa, maíz, arron y aderezo  · [place] (brett, en polvo o en trozos) con mariscos, kunz, sopas, en dips y salsas, [address], [place] y aves  · Inder con seth, ensaladas de fruta, pescado al horno o a la linh, mariscos, espinacas o ensaladas revueltas  · Cebolla (cruda, picada o en trozos) con res, hígado, [address] de [place], [address] [facility], guisados, pasta y [place]  · Vinagre (flako balsmámico, de claudio, condimentado, de vino tinto, o canales) con pepinos, ejotes cocidos, arron, aderezos, espinacas y comida de [address] [place] [address]  Cómo usar las etiquetas de los alimentos para escoger sazonadores bajos en sodio:  Leer las etiquetas de los alimentos es sj buena forma de aprender si los alimentos contienen o no Mcgee, y qué tanto sodio contienen  La lista de engredientes en la etiqueta le dirá si los sazonadores o alimentos contienen sodio  Un alimento contiene sodio si el ingrediente dice "Na" (símbolo del sodio), "salt", "soda" (bicarbonato), o "sodium" (sodio)   Las etiquetas de los alimentos mencionan la cantidad de sodio en miligramos (mg)  A continuación hay algunas palabras sobre el sodio que pueden aparecer en la etiqueta y rosa significado  Solicite a rosa médico más información sobre la forma de leer las etiquetas de los alimentos  · Leonardo de sodio o sal: Menos de gurwinder mg en cada ración  · Sodio muy bajo:  Treinta y gurwinder (28) mg de sodio o menos en cada ración  · Bajo sodio:  Ciento cuarenta (140) mg de sodio o menos en cada ración  · Menos sodio o reducido: Al menos 25 por ciento menos sodio en cada ración  Por ejemplo, un alimento puede tener 800 mg de sodio en cada ración  El mismo alimento echo con sodio reducido tendrá 600 mg de sodio  · Bajo en sodio: [place] (48) por ciento menos sodio en cada ración  Por ejemplo, un alimento puede tener 500 mg de sodio en cada ración  La misma comida con sodio reducido tendría 250 mg de sodio  · Sin sal o sin sal adicional: No se le agrega sal   Otras formas de disminuir el sodio:   · Consulte a rosa médico antes de usar sustitutos de sal si necesita limitar el potasio en rosa [place]  Pueden ser [employer] en potasio y no ser seguros para usted  · Los alimentos empacados y de comida rápida son altos en sodio  Compre alimentos bajos en sal o sodio siempre que sea posible  Consuma alimentos y productos caseros o frescos para evitar el exceso de Mcge[place]  Compre verduras frescas, verduras congeladas o verduras enlatadas con bajo contenido de sodio o sin sal agregada  · Evite las sopas enlatas o hechas de mezclas secas  Compre sopas bajas en sodio o dana rosa sopa en casa sin sal  Use sopas, caldos y consomés bajos en sal     · Evite aves (seth, pavo), pescado, o carne enlatados, [place], o procesados  Límite las [place] curadas flako tocino y [place]  · El queso regular contiene sj cantidad media o ryan de sal  Si usted come queso, compre los tipos bajos en sodio siempre que sea posible   Agregue sólo un tercio o la mitad de la cantidad de [employer] recetas  © 2017 [address] Information is for End User's use only and may not be sold, redistributed or otherwise used for commercial purposes  All illustrations and images included in CareNotes® are the copyrighted property of A ROSIE A M , Inc  or Brian Fall  Esta información es sólo para uso en educación  Law[place] intención no es darle un consejo médico sobre enfermedades o tratamientos  Colsulte con lawton [place] [facility] farmacéutico antes de seguir cualquier régimen médico para saber si es seguro y efectivo para usted  Hipertensión en adultos mayores   LO QUE NECESITA SABER:   La hipertensión es la presión arterial caitlin  La presión arterial es la fuerza que ejerce la jeanette contra las cox de las arterias  La presión arterial normal debería estar a menos de 120/80  La pre-hipertensión estaría entre 120/80 y 139/ 80  La presión arterial caitlin estaría a 140/90 o más caitlin  La hipertensión hace que lawton corazón trabaje mucho más de lo normal  [place] puede dañar lawton corazón  Lawton presión arterial aumentará con la edad  Sin embargo, la hipertensión no es sj parte normal del envejecimiento  Puede controlar la hipertensión con un estilo de janet saludable o con medicamentos  La presión [place] a proteger florin órganos flako lawton corazón, pulmones, cerebro, y riñones  INSTRUCCIONES SOBRE [facility]:   Llame al 911 o pídale a alguien más que llame en cualquiera de los siguientes casos:   · Usted tiene malestar en el pecho que se siente flako estrujamiento, presión, Beatty Gathers o dolor  · Usted se siente confundido o tiene dificultad para hablar  · Repentinamente se siente aturdido o con dificultad para respirar  · Usted tiene dolor o [employer] espalda, [place], [place], abdomen o [place]  · Usted se desmaya o pierde el conocimiento  Regrese a la cristian de emergencias si:   · Usted tiene un jonathon dolor de rosemarie o pérdida de la visión      · Usted tiene debilidad en un [place] [employer] o [employer]  · Se marea y   Pregúntele a rosa Wenda Gamma vitaminas y minerales son adecuados para usted  · Usted se siente mareado, confundido, somnoliento o flako si se fuera a desmayar  · Usted se ha tomado rosa medicamento para la presión arterial ivanna rosa presión arterial todavía está más ryan de lo que le indicó rosa médico     · Rosa presión arterial es más baja de lo que rosa médico dijo que debería ser  · Usted tiene preguntas o inquietudes acerca de rosa condición o cuidado  Medicamentos:  Es posible que usted necesite alguno de los siguientes:  · Medicamento  podría usarse para ayudar a disminuir la presión arterial  Es posible que necesite más de un tipo de [place]  · Diuréticos  ayudan a eliminar el exceso de líquido que se acumula en el organismo  [place] contribuirá a bajar rosa presión arterial  Es posible que orine más seguido mientras hanh pam medicamento  · [place] florin medicamentos flako se le haya indicado  Consulte con rosa médico si usted ai que rosa medicamento no le está ayudando o si presenta efectos secundarios  Infórmele si es alérgico a cualquier medicamento  Mantenga sj lista actualizada de los [place], las vitaminas y los productos herbales que hanh  Incluya los siguientes datos de los medicamentos: cantidad, frecuencia y motivo de administración  Traiga con usted la lista o los envases de la píldoras a florin citas de seguimiento  Lleve la lista de los medicamentos con usted en michi de sj emergencia  Lo que necesita saber sobre los medicamentos para la presión arterial:   · Tómese el medicamento a la misma hora todos los días  · No deje de samantha los medicamentos si la presión arterial es la deseada  Que la presión arterial sea la deseada significa que los medicamentos actúan correctamente  · Conozca el nombre y la dosis de rosa medicamento  · Vuélvase a surtir de florin medicamentos antes de que se agoten       · Los medicamentos para la presión arterial pueden hacer que se sienta mareado  Póngase de pie lentamente después de silvina estado acostado o sentado  [place] ayudará a evitar el mareo o el riesgo de caídas  · Pregúntele a rosa médico si debe samantha rosa medicamento para la presión arterial el día de [place] o un procedimiento  Acuda a florin consultas de control con rosa médico según le indicaron  Tendrá que regresar para que le tomen la presión arterial  Anote florin preguntas para que se acuerde de hacerlas ramesh florin visitas  Maneje rosa hipertensión:   · Tómese la presión arterial en rosa hogar 2 veces al día o según lo que le indicaron  Tómese la presión arterial a la [employer] días, por [facility], [facility] vez a la mañana y otra vez a la noche  Tómese la presión 2 veces en cada oportunidad  Pídale más instrucciones a rosa [place] un registro de las lecturas de rosa presión arterial y llévelo consigo a florin consultas  Pregúntele a rosa médico cuál debe ser rosa presión arterial            · Limite el sodio flako se le indique  Consumir demasiado sodio puede afectar el equilibrio de líquidos y hacer que sea difícil controlar rosa presión arterial  Revise las etiquetas para buscar alimentos bajos en sodio o sin sal agregada  Algunos alimentos bajos en sodio utilizan sales de potasio para añadir sabor  Demasiado potasio también puede causar problemas de [place]  Rosa médico le dirá qué cantidad de sodio y potasio es minor para el consumo en un día  Él puede recomendarle que limite el sodio a 2,300 mg al día  · Siga el plan de comidas recomendado por rosa médico   Un dietista o médico puede darle más información sobre planes de bajo contenido de sodio o el plan de alimentación DASH (enfoques dietéticos para detener la hipertensión)  El plan DASH es bajo en sodio, grasas saturadas y grasa total  Es alto en potasio, calcio y Ama  · Ejercítese para mantener un peso saludable    El ejercicio también ayudará a bajar rosa presión arterial  [employer] actividad física ramesh 30 minutos al día, la mayoría de los días de la [place]  Comience ejercitándose 10 minutos cada día  Darra Dash de actividades físicas son caminatas rápidas o andar en [place]  Consulte con lawton médico antes de empezar un plan de ejercicios  Él puede garantizar que el plan de ejercicio sea seguro para usted  · [facility] estrés  [place] puede contribuir a bajar lawton presión arterial  Aprenda sobre formas de [place], flako respiración profunda o escuchar música  Duerma lo suficiente cada la noche  La falta de sueño puede aumentar lawton nivel de estrés  · Limite o no consuma bebidas alcohólicas  Pregunte a lawton médico si usted puede samantha alcohol  Pregunte cuanto es la cantidad minor para usted samantha  · No fume  La nicotina y otros químicos en los cigarrillos y cigarros pueden aumentar lawton presión arterial y también pueden provocar daño al pulmón  Pida información a lawton médico si usted actualmente fuma y necesita ayuda para dejar de fumar  Los cigarrillos electrónicos o tabaco sin humo todavía contienen nicotina  Consulte con lawton médico antes de [employer]  · Controle cualquier otra condición médica que usted tenga  Algunas condiciones médicas flako la diabetes pueden aumentar lawton riesgo de hipertensión  [address] lawton médico y tómese florin medicamentos según dichas instrucciones  © 2017 [employer] Information is for End User's use only and may not be sold, redistributed or otherwise used for commercial purposes  All illustrations and images included in CareNotes® are the copyrighted property of A D A M , Inc  or Brian Fall  Esta información es sólo para uso en educación  Lawton intención no es darle un consejo médico sobre enfermedades o tratamientos  Colsulte con lawton [employer] farmacéutico antes de seguir cualquier régimen médico para saber si es seguro y efectivo para usted

## 2018-08-29 NOTE — ED PROVIDER NOTES
History  Chief Complaint   Patient presents with    Hypertension     via , "My BP is high, it's like 170/109"  Dizzy at times       History provided by:  Patient  Hypertension   Severity:  Mild  Onset quality:  Gradual  Duration:  2 days  Timing:  Constant  Progression:  Unchanged  Chronicity:  Chronic  Time since last dose of antihypertensive:  4 hours  Notable PTA blood pressures:  Subjective  > 170  Context: normal sodium, not caffeine, not herbal remedies, not medication change and not OTC medications used    Relieved by:  Nothing  Worsened by:  Nothing  Ineffective treatments: ARB  Associated symptoms: dizziness    Associated symptoms: no abdominal pain, no anxiety, no blurred vision, no chest pain, no confusion, no ear pain, no epistaxis, no fatigue, no fever, no headaches, no hematuria, no loss of consciousness, no nausea, no neck pain, no palpitations, no shortness of breath, no syncope, not vomiting and no weakness        Prior to Admission Medications   Prescriptions Last Dose Informant Patient Reported? Taking?   acetaminophen (TYLENOL) 325 mg tablet  Self Yes No   Sig: Take 1-2 tablets by mouth every 4 (four) hours as needed   albuterol (2 5 mg/3 mL) 0 083 % nebulizer solution  Self No No   Sig: Take 1 vial (2 5 mg total) by nebulization every 6 (six) hours as needed for wheezing or shortness of breath   albuterol (VENTOLIN HFA) 90 mcg/act inhaler  Self Yes No   Sig: Inhale 2 puffs   aspirin (ECOTRIN LOW STRENGTH) 81 mg EC tablet  Self Yes No   Sig: Take 81 mg by mouth daily   atorvastatin (LIPITOR) 10 mg tablet  Self Yes No   Sig: Take 10 mg by mouth daily   budesonide-formoterol (SYMBICORT) 160-4 5 mcg/act inhaler  Self Yes No   Sig: Inhale 2 puffs 2 (two) times a day Rinse mouth after use     furosemide (LASIX) 20 mg tablet   No No   Sig: Take 1 tablet (20 mg total) by mouth daily   losartan (COZAAR) 100 MG tablet   No Yes   Sig: Take 1 tablet (100 mg total) by mouth daily   metoprolol tartrate (LOPRESSOR) 50 mg tablet  Self Yes Yes   Sig: Take 50 mg by mouth every 12 (twelve) hours   pantoprazole (PROTONIX) 40 mg tablet   No No   Sig: Take 1 tablet (40 mg total) by mouth daily      Facility-Administered Medications: None       Past Medical History:   Diagnosis Date    Abdominal pain     Aneurysm, aorta, thoracic ([address] )     AND ABDOMINAL; KT 8/19/17 - FOLLOWS WITH DR MURILLO  PER OP REC FROM JULY 2017  NEEDS REPEAT CT AND ULTRASOUND IN 6 MONTHS  INTERVENTION IF SIZE > 5-51/2CM    COPD (chronic obstructive pulmonary disease) (HCC)     GERD (gastroesophageal reflux disease)     Hypertension     Ulcer     Varicose vein of leg        Past Surgical History:   Procedure Laterality Date    HERNIA REPAIR      LEG SURGERY Bilateral     VARICOSE VEIN SURGERY         Family History   Problem Relation Age of Onset    No Known Problems Mother     No Known Problems Father     Cancer Sister      I have reviewed and agree with the history as documented  Social History   Substance Use Topics    Smoking status: Former Smoker     Packs/day: 0 25     Years: 35 00     Start date: 1966     Quit date: 2001    Smokeless tobacco: Former User      Comment: TOBACCO USE    Alcohol use No        Review of Systems   Constitutional: Negative for activity change, appetite change, chills, fatigue and fever  HENT: Negative for ear pain, nosebleeds, sneezing and sore throat  Eyes: Negative for blurred vision, pain and visual disturbance  Respiratory: Positive for wheezing  Negative for cough and shortness of breath  Cardiovascular: Negative for chest pain, palpitations and syncope  Gastrointestinal: Negative for abdominal pain, blood in stool, constipation, diarrhea, nausea and vomiting  Genitourinary: Negative for dysuria and hematuria  Musculoskeletal: Negative for arthralgias, neck pain and neck stiffness  Skin: Negative for rash and wound  Neurological: Positive for dizziness   Negative for loss of consciousness, weakness, light-headedness, numbness and headaches  Psychiatric/Behavioral: Negative for confusion  The patient is not nervous/anxious  All other systems reviewed and are negative  Physical Exam  Physical Exam   Constitutional: He is oriented to person, place, and time  He appears well-developed and well-nourished  No distress  HENT:   Head: Normocephalic and atraumatic  Nose: Nose normal    Eyes: Conjunctivae and EOM are normal  Pupils are equal, round, and reactive to light  Neck: Normal range of motion  Neck supple  Cardiovascular: Normal rate, regular rhythm, normal heart sounds and intact distal pulses  Exam reveals no gallop and no friction rub  No murmur heard  Pulmonary/Chest: Effort normal  No respiratory distress  He has wheezes  He has no rales  Diffuse and expiratory wheezes   Abdominal: Soft  He exhibits no distension  There is no tenderness  Musculoskeletal: Normal range of motion  He exhibits no edema, tenderness or deformity  Lymphadenopathy:     He has no cervical adenopathy  Neurological: He is alert and oriented to person, place, and time  Skin: Skin is warm and dry  Capillary refill takes less than 2 seconds  He is not diaphoretic  No erythema  No pallor  Nursing note and vitals reviewed        Vital Signs  ED Triage Vitals   Temperature Pulse Respirations Blood Pressure SpO2   08/29/18 0952 08/29/18 0952 08/29/18 0952 08/29/18 0952 08/29/18 0952   97 9 °F (36 6 °C) 72 (!) 24 (!) 186/106 (!) 89 %      Temp Source Heart Rate Source Patient Position - Orthostatic VS BP Location FiO2 (%)   08/29/18 0952 08/29/18 1021 08/29/18 0952 08/29/18 0952 --   Temporal Monitor Sitting Left arm       Pain Score       --                  Vitals:    08/29/18 0952 08/29/18 1021 08/29/18 1136 08/29/18 1255   BP: (!) 186/106 (!) 171/95 160/98 160/99   Pulse: 72 68 66 60   Patient Position - Orthostatic VS: Sitting          Visual Acuity      ED Medications  Medications   metoprolol tartrate (LOPRESSOR) tablet 50 mg (50 mg Oral Given 8/29/18 1136)       Diagnostic Studies  Results Reviewed     Procedure Component Value Units Date/Time    Comprehensive metabolic panel [[id number]]  (Abnormal) Collected:  08/29/18 1200    Lab Status:  Final result Specimen:  Blood from Arm, Left Updated:  08/29/18 1233     Sodium 142 mmol/L      Potassium 4 9 mmol/L      Chloride 100 mmol/L      CO2 38 (H) mmol/L      ANION GAP 4 (L) mmol/L      BUN 18 mg/dL      Creatinine 0 89 mg/dL      Glucose 101 (H) mg/dL      Calcium 8 8 mg/dL      AST 42 U/L      ALT 25 U/L      Alkaline Phosphatase 67 U/L      Total Protein 7 4 g/dL      Albumin 3 8 g/dL      Total Bilirubin 0 80 mg/dL      eGFR 80 ml/min/1 73sq m     Narrative:       Hemolysis  National Kidney Disease Education Program recommendations are as follows:  GFR calculation is accurate only with a steady state creatinine  Chronic Kidney disease less than 60 ml/min/1 73 sq  meters  Kidney failure less than 15 ml/min/1 73 sq  meters      Troponin I [[id number]]  (Normal) Collected:  08/29/18 1101    Lab Status:  Final result Specimen:  Blood from Arm, Right Updated:  08/29/18 1130     Troponin I <0 01 ng/mL     CBC and differential [[id number]]  (Abnormal) Collected:  08/29/18 1054    Lab Status:  Final result Specimen:  Blood from Arm, Right Updated:  08/29/18 1105     WBC 6 60 Thousand/uL      RBC 5 12 Million/uL      Hemoglobin 16 4 g/dL      Hematocrit 50 7 %      MCV 99 fL      MCH 32 1 pg      MCHC 32 4 g/dL      RDW 14 5 %      MPV 7 3 (L) fL      Platelets 560 Thousands/uL      Neutrophils Relative 67 (H) %      Lymphocytes Relative 15 (L) %      Monocytes Relative 8 %      Eosinophils Relative 10 (H) %      Basophils Relative 1 %      Neutrophils Absolute 4 40 Thousands/µL      Lymphocytes Absolute 1 00 Thousands/µL      Monocytes Absolute 0 50 Thousand/µL      Eosinophils Absolute 0 60 (H) Thousand/µL      Basophils Absolute 0 10 Thousands/µL                  XR chest 2 views   Final Result by Keagan Martinez MD (08/29 1150)      No acute cardiopulmonary disease  Workstation performed: [id number]                    Procedures  Procedures       Phone Contacts  ED Phone Contact    ED Course  ED Course as of Sep 05 0606   Wed Aug 29, 2018   1009 Procedure Note: EKG  Date/Time: 08/29/18 10:09 AM   Performed by: Beto Bui by: Mary Duque  ECG interpreted by me, the ED Provider: yes   The EKG demonstrates:  Rate 70  Rhythm NSR  QTc 432  No ST elevations/depressions, suspect incomplete LBBB in V3        1241 Pt blood pressure improved in the ED after given home meds  Patient workup unremarkable  Patient with improved BS after duonbe  Will give patient azithromycin for COPD exacerbation  Will have patient start albuterol Q4 x2 days for exacerbation  Will have patient then Q4 prn  Pt has PCP appt in two days for BP recheck, No symptoms currently  Pt educated on signs of HTN emergency  MDM  Number of Diagnoses or Management Options  COPD with acute exacerbation [facility]): new and requires workup  Hypertension: new and requires workup  Diagnosis management comments: ddx to include but not limited to: ACS, COPD, CHF, BELLA, Cardiac arrythmia    Patient is an 81-year-old male with history of hypertension and COPD presents emergency department for evaluation hypertension  Patient states that he has noticed last 3 days his blood pressure readings have been high at home  He states that normally around 130 in the been the 170s  He states he takes his medications as directed  He is on metoprolol 50mg BID and losartan 10mg daily  Patient states that because his blood pressure was high this morning, he took a dose of losartan but did not take his other medications  He reports having intermittent dizzy when she is not sure of his because of his HTN    No lightheadedness, syncope, chest pain, shortness of breath  Patient has history of COPD and does report feeling wheezy  No swelling in his legs  No dyspnea on exertion  No shortness of breath when sleeping flat at night  On exam patient is in no acute distress  He is hypertensive in the emergency department  He does have diffuse wheezing on exam   Plan will be to get CBC, CMP, troponin, EKG, CXR, duoneb and reevaluate       Amount and/or Complexity of Data Reviewed  Clinical lab tests: ordered and reviewed  Tests in the radiology section of CPT®: ordered and reviewed    Risk of Complications, Morbidity, and/or Mortality  Presenting problems: moderate  Diagnostic procedures: moderate  Management options: moderate    Patient Progress  Patient progress: improved    CritCare Time    Disposition  Final diagnoses:   COPD with acute exacerbation ([facility] [address] )   Hypertension     Time reflects when diagnosis was documented in both MDM as applicable and the Disposition within this note     Time User Action Codes Description Comment    8/29/2018 12:43 PM Gretchen Webb Add [J44 1] COPD with acute exacerbation ([facility] [address] )     8/29/2018 12:43 PM Shayla Fletcher Add [I10] Hypertension       ED Disposition     ED Disposition Condition Comment    Discharge  [address] discharge to home/self care      Condition at discharge: Stable          Follow-up Information     Follow up With Specialties Details Why Contact Info    Milton Olson MD Family Medicine Go in 2 days  [id number] [place] [address] Emergency Department Emergency Medicine  If symptoms worsen [address] [address] [phone]          Discharge Medication List as of 8/29/2018 12:50 PM      START taking these medications    Details   azithromycin (ZITHROMAX) 250 mg tablet Take 2 tablets today then 1 tablet daily x 4 days, Print         CONTINUE these medications which have NOT CHANGED    Details   losartan (COZAAR) 100 MG tablet Take 1 tablet (100 mg total) by mouth daily, Starting Mon 8/6/2018, Normal      metoprolol tartrate (LOPRESSOR) 50 mg tablet Take 50 mg by mouth every 12 (twelve) hours, Historical Med      acetaminophen (TYLENOL) 325 mg tablet Take 1-2 tablets by mouth every 4 (four) hours as needed, Starting Tue 8/8/2017, Historical Med      albuterol (2 5 mg/3 mL) 0 083 % nebulizer solution Take 1 vial (2 5 mg total) by nebulization every 6 (six) hours as needed for wheezing or shortness of breath, Starting Tue 6/26/2018, Normal      albuterol (VENTOLIN HFA) 90 mcg/act inhaler Inhale 2 puffs, Starting Tue 8/8/2017, Historical Med      aspirin (ECOTRIN LOW STRENGTH) 81 mg EC tablet Take 81 mg by mouth daily, Historical Med      atorvastatin (LIPITOR) 10 mg tablet Take 10 mg by mouth daily, Historical Med      budesonide-formoterol (SYMBICORT) 160-4 5 mcg/act inhaler Inhale 2 puffs 2 (two) times a day Rinse mouth after use , Historical Med      furosemide (LASIX) 20 mg tablet Take 1 tablet (20 mg total) by mouth daily, Starting Thu 8/2/2018, Normal      pantoprazole (PROTONIX) 40 mg tablet Take 1 tablet (40 mg total) by mouth daily, Starting Mon 8/20/2018, Normal      amLODIPine (NORVASC) 10 mg tablet Take 10 mg by mouth, Historical Med           No discharge procedures on file      ED Provider  Electronically Signed by           Jn Isaac PA-C  09/05/18 9338

## 2018-08-29 NOTE — ED NOTES
Supposed to be on oxygen 2 [facility] - 24/7 as per previous MD note     Peder Homans, SCHUYLER  08/29/18 3029

## 2018-08-30 LAB
ATRIAL RATE: 70 BPM
P AXIS: 58 DEGREES
PR INTERVAL: 158 MS
QRS AXIS: 66 DEGREES
QRSD INTERVAL: 82 MS
QT INTERVAL: 400 MS
QTC INTERVAL: 432 MS
T WAVE AXIS: 59 DEGREES
VENTRICULAR RATE: 70 BPM

## 2018-08-30 PROCEDURE — 93010 ELECTROCARDIOGRAM REPORT: CPT | Performed by: INTERNAL MEDICINE

## 2018-08-31 ENCOUNTER — HOSPITAL ENCOUNTER (OUTPATIENT)
Dept: CT IMAGING | Facility: HOSPITAL | Age: 83
Discharge: HOME | End: 2018-08-31
Payer: COMMERCIAL

## 2018-08-31 DIAGNOSIS — I71.9 DESCENDING AORTIC ANEURYSM (HCC): ICD-10-CM

## 2018-08-31 PROCEDURE — 71275 CT ANGIOGRAPHY CHEST: CPT

## 2018-08-31 PROCEDURE — 74174 CTA ABD&PLVS W/CONTRAST: CPT

## 2018-08-31 RX ADMIN — IOHEXOL 100 ML: 350 INJECTION, SOLUTION INTRAVENOUS at 10:21

## 2018-09-04 DIAGNOSIS — I10 ESSENTIAL HYPERTENSION: Primary | ICD-10-CM

## 2018-09-04 RX ORDER — AMLODIPINE BESYLATE 10 MG/1
10 TABLET ORAL DAILY
Qty: 30 TABLET | Refills: 0 | Status: SHIPPED | OUTPATIENT
Start: 2018-09-04 | End: 2018-09-27

## 2018-09-27 ENCOUNTER — OFFICE VISIT (OUTPATIENT)
Dept: CARDIAC SURGERY | Facility: CLINIC | Age: 83
End: 2018-09-27
Payer: COMMERCIAL

## 2018-09-27 VITALS
RESPIRATION RATE: 16 BRPM | HEIGHT: 61 IN | OXYGEN SATURATION: 82 % | WEIGHT: 164 LBS | HEART RATE: 70 BPM | SYSTOLIC BLOOD PRESSURE: 164 MMHG | BODY MASS INDEX: 30.96 KG/M2 | TEMPERATURE: 97.7 F | DIASTOLIC BLOOD PRESSURE: 98 MMHG

## 2018-09-27 DIAGNOSIS — I71.9 DESCENDING AORTIC ANEURYSM (HCC): Primary | ICD-10-CM

## 2018-09-27 PROCEDURE — 99215 OFFICE O/P EST HI 40 MIN: CPT | Performed by: PHYSICIAN ASSISTANT

## 2018-09-27 NOTE — LETTER
September 27, 2018     Angela Frank MD  [address] [place] [address]    Patient: Tello Morgan   YOB: 1935   Date of Visit: 9/27/2018       Dear Dr Avis Seaman: Thank you for referring Tello Morgan to me for evaluation  Below are my notes for this consultation  If you have questions, please do not hesitate to call me  I look forward to following your patient along with you  Sincerely,        Raina Hyman MD        CC: Dorota Ortega MD  [place], [place]  9/27/2018 10:42 AM  Attested  Consultation - Cardiothoracic Surgery   Tello Morgan 82 y o  male MRN: [id number]    Reason for Consult / Principal Problem: descending aortic aneurysm    History of Present Illness: Tello Morgan is a 82y o  year old male who presents today for follow up from his incidental finding of descending aortic aneurysm last seen in our office in February of this year  He reports back to the office today for his 6 month visit with CTA chest abdomen pelvis scan  He has been also seen by vascular surgery  The patient is hypertensive in the office today and the patient states he did not take his medications prior to coming to the visit  He was seen in the ER as well a few weeks ago for high blood pressure  He is on losartan and states he developed a rash on amlodipine  He is Indonesian speaking only however his son is translating  He is wearing oxygen on examination and uses it with activity, he does not wear it at home while doing his daily living activities but does wear 2L at night  Overall he states he has been feeling well and denies any chest pain, worsening SOB, nausea, headaches or back pain  He does not drive but performs his ADLs independently and does not use an assistance device for activity  In addition to his son here on follow up his wife is present as well       Past Medical History:  Past Medical History:   Diagnosis Date    Abdominal pain  Aneurysm, aorta, thoracic ([address] )     AND ABDOMINAL; KT 8/19/17 - FOLLOWS WITH DR MURILLO  PER OP REC FROM JULY 2017  NEEDS REPEAT CT AND ULTRASOUND IN 6 MONTHS  INTERVENTION IF SIZE > 5-51/2CM    COPD (chronic obstructive pulmonary disease) (HCC)     GERD (gastroesophageal reflux disease)     Hypertension     Ulcer     Varicose vein of leg      Past Surgical History:   Past Surgical History:   Procedure Laterality Date    HERNIA REPAIR      Inguinal    LEG SURGERY Bilateral     VARICOSE VEIN SURGERY       Family History:  Family History   Problem Relation Age of Onset    No Known Problems Mother     No Known Problems Father     Cancer Sister     Diabetes Family     Hypertension Family      Social History:  History   Alcohol Use No     History   Drug Use No     History   Smoking Status    Former Smoker    Packs/day: 0 25    Years: 35 00    Start date: 1966    Quit date: 2001   Smokeless Tobacco    Former User     Comment: TOBACCO USE  As per [employer]: Current some day smoker       Home Medications:   Prior to Admission medications    Medication Sig Start Date End Date Taking? Authorizing Provider   acetaminophen (TYLENOL) 325 mg tablet Take 1-2 tablets by mouth every 4 (four) hours as needed 8/8/17  Yes Historical Provider, MD   albuterol (2 5 mg/3 mL) 0 083 % nebulizer solution Take 1 vial (2 5 mg total) by nebulization every 6 (six) hours as needed for wheezing or shortness of breath 6/26/18  Yes Darryn Fernandez MD   albuterol (VENTOLIN HFA) 90 mcg/act inhaler Inhale 2 puffs 8/8/17  Yes Historical Provider, MD   atorvastatin (LIPITOR) 10 mg tablet Take 10 mg by mouth daily   Yes Historical Provider, MD   budesonide-formoterol (SYMBICORT) 160-4 5 mcg/act inhaler Inhale 2 puffs 2 (two) times a day Rinse mouth after use     Yes Historical Provider, MD   furosemide (LASIX) 20 mg tablet Take 1 tablet (20 mg total) by mouth daily 8/2/18  Yes Chioma Crowley MD   losartan (COZAAR) 100 MG tablet Take 1 tablet (100 mg total) by mouth daily 8/6/18  Yes Malia Hannah MD   metoprolol tartrate (LOPRESSOR) 50 mg tablet Take 50 mg by mouth every 12 (twelve) hours   Yes Historical Provider, MD   pantoprazole (PROTONIX) 40 mg tablet Take 1 tablet (40 mg total) by mouth daily 8/20/18  Yes Malia Hannah MD   tiotropium [facility]) 18 mcg inhalation capsule Place 18 mcg into inhaler and inhale 9/18/18 9/18/19 Yes Historical Provider, MD   aspirin (ECOTRIN LOW STRENGTH) 81 mg EC tablet Take 81 mg by mouth daily    Historical Provider, MD   mupirocin (BACTROBAN) 2 % nasal ointment into each nostril 2 (two) times a day Start 5 days prior to surgery 9/27/18   Emily Reynoso PA-C   amLODIPine (NORVASC) 10 mg tablet Take 1 tablet (10 mg total) by mouth daily  Patient not taking: Reported on 9/27/2018 9/4/18 9/27/18  Malia Hannah MD       Allergies: Allergies   Allergen Reactions    Lisinopril     Penicillins Hives, Itching and Rash       Review of Systems:  Review of Systems   Constitutional: Negative  HENT: Negative  Eyes: Negative  Respiratory: Positive for shortness of breath  Uses oxygen intermittently   Cardiovascular: Negative  Gastrointestinal: Negative  Endocrine: Negative  Genitourinary: Negative  Musculoskeletal: Negative  Skin: Negative  Allergic/Immunologic: Negative  Neurological: Negative  Hematological: Negative  Psychiatric/Behavioral: Negative  Vital Signs:     Vitals:    09/27/18 0900 09/27/18 1001   BP: (!) 170/104 164/98   BP Location: Left arm Right arm   Cuff Size: Adult    Pulse: 70    Resp: 16    Temp: 97 7 °F (36 5 °C)    TempSrc: Oral    SpO2: (!) 82%    Weight: 74 4 kg (164 lb)    Height: 5' 1" (1 549 m)        Physical Exam:  Physical Exam   Constitutional: He is oriented to person, place, and time  He appears well-developed and well-nourished  No distress  HENT:   Head: Normocephalic and atraumatic     Right Ear: External ear normal    Left Ear: External ear normal    Nose: Nose normal    Mouth/Throat: Oropharynx is clear and moist  No oropharyngeal exudate  Eyes: Pupils are equal, round, and reactive to light  Conjunctivae and EOM are normal  Right eye exhibits no discharge  Left eye exhibits no discharge  No scleral icterus  Neck: Normal range of motion  Neck supple  No JVD present  No tracheal deviation present  Cardiovascular: Normal rate, regular rhythm, normal heart sounds and intact distal pulses  Pulmonary/Chest: Breath sounds normal  No stridor  No respiratory distress  He has no wheezes  He has no rales  He exhibits no tenderness  Abdominal: Soft  Bowel sounds are normal  He exhibits no distension  There is no tenderness  There is no rebound and no guarding  Musculoskeletal: Normal range of motion  He exhibits edema  He exhibits no tenderness or deformity  Neurological: He is alert and oriented to person, place, and time  No cranial nerve deficit  Coordination normal    Skin: Skin is warm and dry  No rash noted  He is not diaphoretic  No erythema  No pallor  Left lower leg: significant varicose veins, with edema, scar to medial portion of lower leg   Psychiatric: He has a normal mood and affect  His behavior is normal  Judgment and thought content normal        Lab Results:                 No results found for: HGBA1C  Lab Results   Component Value Date    CKTOTAL 105 02/26/2014    TROPONINI <0 01 08/29/2018       Imaging Studies:     CT Chest:   FINDINGS:     VASCULAR STRUCTURES:  The ascending aorta is nonaneurysmal measuring a maximal 36 mm in transverse diameter  The proximal aortic arch is mildly ectatic measuring 38 mm  A fusiform descending thoracic aortic aneurysm measures a maximal 54 mm in   transverse diameter (previously 48)  The aneurysm terminates at the aortic hiatus    Classic branching anatomy of the aortic arch is present without stenoses at the origins of the great vessels      The abdominal aorta is mildly atherosclerotic and tortuous, but nonaneurysmal   2 right and one left renal artery are patent  The celiac artery, superior mesenteric artery, and inferior mesenteric artery are patent  The iliofemoral segments are mildly   atherosclerotic and tortuous, but patent  The internal iliac arteries are patent      OTHER FINDINGS:      CHEST:      HEART:  Normal cardiac size  No pericardial effusion      LUNGS:  Mild emphysematous changes with linear scarring in the right middle lobe  Bibasilar subsegmental atelectasis  1 mm right middle lobe pulmonary nodule      PLEURA: No pleural effusion      MEDIASTINUM AND PHYLLIS:  No mass or significant lymphadenopathy      CHEST WALL AND LOWER NECK: Normal      ABDOMEN     LIVER/BILIARY TREE:  Nonspecific 3 mm hypervascular focus in the inferior right lobe of the liver (3/132)      GALLBLADDER:  No calcified gallstones  No pericholecystic inflammatory change      SPLEEN:  Unremarkable  Normal size      PANCREAS:  Unremarkable      ADRENAL GLANDS:  Unremarkable      KIDNEYS/URETERS:  No solid renal mass  No hydronephrosis  Multiple bilateral nonobstructing renal calculi  The largest right calculus is 5 mm and the largest left calculus is 6 mm      PELVIS     REPRODUCTIVE ORGANS:  Unremarkable for patient's age      URINARY BLADDER:  Unremarkable      ADDITIONAL ABDOMINAL AND PELVIC STRUCTURES:      STOMACH AND BOWEL:  Extensive colonic diverticulosis without diverticulitis      ABDOMINOPELVIC CAVITY:  No pathologically enlarged mesenteric or retroperitoneal lymph nodes    No ascites or free intraperitoneal air      ABDOMINAL WALL/INGUINAL REGIONS:  Bilateral inguinal hernias containing small bowel on the right and fat on the left      OSSEOUS STRUCTURES:  No acute fracture or destructive osseous lesion      IMPRESSION:     Mild enlargement of the descending thoracic aortic aneurysm now measuring 54 mm     Emphysema     Stable 1 mm pulmonary nodule in the right middle lobe since 2014      Diverticulosis     Bilateral inguinal hernias with bowel on the right    reviewed in office    Assessment:  Patient Active Problem List    Diagnosis Date Noted    Edema of both legs 08/02/2018    Snoring 08/02/2018    Varicose veins of both lower extremities with pain 05/23/2018    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[facility] ) 05/23/2018    Chronic respiratory failure with hypoxia ([facility] ) 04/02/2018    Hypertension 02/01/2018    COPD (chronic obstructive pulmonary disease) ([facility] ) 02/01/2018    Descending aortic aneurysm ([facility] ) 02/01/2018    History of DVT (deep vein thrombosis) 02/01/2018    Benign essential hypertension 08/02/2017    Thoracic aortic aneurysm ([facility] ) 08/02/2017     Plan:     CTA chest/abdomen/pelvis performed prior to the visit today was reviewed  Enlargement in descending thoracic aorta from prior study  These findings were confirmed and shared with the patient today  Preoperative testing ordered, f/u with vascular surgery  Tello Morgan was comfortable with our recommendations, and their questions were answered to their satisfaction  Thank you for allowing us to participate in the care of this patient  Aortic Aneurysm Instructions were provided to the patient as follows:    1  No lifting more than 50 pounds  2  Maintain a controlled blood pressure with a goal of 120/80- follow up PCP who manages his BP medications if no improvement, take medications as prescribed every morning, discussed risk associated with high BP   3  Follow up in Aortic Clinic as recommended with radiology follow up as instructed  4  Report to the ER or call 911 immediately with the following signs / symptoms: sudden onset of back pain, chest pain or shortness of breath  5  Tobacco cessation discussed      SIGNATURE: Marixa Alonso  DATE: September 27, 2018  TIME: 10:29 AM  Attestation signed by Raina Hyman MD at 9/27/2018 11:34 AM:  Patient seen and evaluated with Franny Love / JEREMIE   I agree with the above assessment and plan with the following additions  The patient is an 70-year-old man with a descending thoracic aorta aneurysm, he comes today for 1 year follow-up, most recent CTA reveals a significant increase in size, his aorta measures 3 6 cm back in 2014 with we started follow-up and has now gone up to 5 5 cm, last year it measure 5 0 cm  I explained the diagnosis to the patient, I recommend TEVAR    I explained the procedure, benefits, risks and possible complications, he understand but he is not sure about moving forward with surgery, he agrees to proceed with preoperative testing and he also agreed to see vascular surgery (Turner Wisdom Doctor)

## 2018-09-27 NOTE — PROGRESS NOTES
Consultation - Cardiothoracic Surgery   Aylin Banda 82 y o  male MRN: [id number]    Reason for Consult / Principal Problem: descending aortic aneurysm    History of Present Illness: Aylin Banda is a 82y o  year old male who presents today for follow up from his incidental finding of descending aortic aneurysm last seen in our office in February of this year  He reports back to the office today for his 6 month visit with CTA chest abdomen pelvis scan  He has been also seen by vascular surgery  The patient is hypertensive in the office today and the patient states he did not take his medications prior to coming to the visit  He was seen in the ER as well a few weeks ago for high blood pressure  He is on losartan and states he developed a rash on amlodipine  He is Mauritian speaking only however his son is translating  He is wearing oxygen on examination and uses it with activity, he does not wear it at home while doing his daily living activities but does wear 2L at night  Overall he states he has been feeling well and denies any chest pain, worsening SOB, nausea, headaches or back pain  He does not drive but performs his ADLs independently and does not use an assistance device for activity  In addition to his son here on follow up his wife is present as well  Past Medical History:  Past Medical History:   Diagnosis Date    Abdominal pain     Aneurysm, aorta, thoracic ([address] )     AND ABDOMINAL; KT 8/19/17 - FOLLOWS WITH DR MURILLO  PER OP REC FROM JULY 2017  NEEDS REPEAT CT AND ULTRASOUND IN 6 MONTHS   INTERVENTION IF SIZE > 5-51/2CM    COPD (chronic obstructive pulmonary disease) (HCC)     GERD (gastroesophageal reflux disease)     Hypertension     Ulcer     Varicose vein of leg      Past Surgical History:   Past Surgical History:   Procedure Laterality Date    HERNIA REPAIR      Inguinal    LEG SURGERY Bilateral     VARICOSE VEIN SURGERY       Family History:  Family History Problem Relation Age of Onset    No Known Problems Mother     No Known Problems Father     Cancer Sister     Diabetes Family     Hypertension Family      Social History:  History   Alcohol Use No     History   Drug Use No     History   Smoking Status    Former Smoker    Packs/day: 0 25    Years: 35 00    Start date: 1966    Quit date: 2001   Smokeless Tobacco    Former User     Comment: TOBACCO USE  As per [employer]: Current some day smoker       Home Medications:   Prior to Admission medications    Medication Sig Start Date End Date Taking? Authorizing Provider   acetaminophen (TYLENOL) 325 mg tablet Take 1-2 tablets by mouth every 4 (four) hours as needed 8/8/17  Yes Historical Provider, MD   albuterol (2 5 mg/3 mL) 0 083 % nebulizer solution Take 1 vial (2 5 mg total) by nebulization every 6 (six) hours as needed for wheezing or shortness of breath 6/26/18  Yes Curtis Branham MD   albuterol (VENTOLIN HFA) 90 mcg/act inhaler Inhale 2 puffs 8/8/17  Yes Historical Provider, MD   atorvastatin (LIPITOR) 10 mg tablet Take 10 mg by mouth daily   Yes Historical Provider, MD   budesonide-formoterol (SYMBICORT) 160-4 5 mcg/act inhaler Inhale 2 puffs 2 (two) times a day Rinse mouth after use     Yes Historical Provider, MD   furosemide (LASIX) 20 mg tablet Take 1 tablet (20 mg total) by mouth daily 8/2/18  Yes Gillian Urrutia MD   losartan (COZAAR) 100 MG tablet Take 1 tablet (100 mg total) by mouth daily 8/6/18  Yes Curtis Branham MD   metoprolol tartrate (LOPRESSOR) 50 mg tablet Take 50 mg by mouth every 12 (twelve) hours   Yes Historical Provider, MD   pantoprazole (PROTONIX) 40 mg tablet Take 1 tablet (40 mg total) by mouth daily 8/20/18  Yes Curtis Branham MD   tiotropium [facility]) 18 mcg inhalation capsule Place 18 mcg into inhaler and inhale 9/18/18 9/18/19 Yes Historical Provider, MD   aspirin (ECOTRIN LOW STRENGTH) 81 mg EC tablet Take 81 mg by mouth daily    Historical Provider, MD   mupirocin (BACTROBAN) 2 % nasal ointment into each nostril 2 (two) times a day Start 5 days prior to surgery 9/27/18   Rebecca Magallon PA-C   amLODIPine (NORVASC) 10 mg tablet Take 1 tablet (10 mg total) by mouth daily  Patient not taking: Reported on 9/27/2018 9/4/18 9/27/18  Jad Zhang MD       Allergies: Allergies   Allergen Reactions    Lisinopril     Penicillins Hives, Itching and Rash       Review of Systems:  Review of Systems   Constitutional: Negative  HENT: Negative  Eyes: Negative  Respiratory: Positive for shortness of breath  Uses oxygen intermittently   Cardiovascular: Negative  Gastrointestinal: Negative  Endocrine: Negative  Genitourinary: Negative  Musculoskeletal: Negative  Skin: Negative  Allergic/Immunologic: Negative  Neurological: Negative  Hematological: Negative  Psychiatric/Behavioral: Negative  Vital Signs:     Vitals:    09/27/18 0900 09/27/18 1001   BP: (!) 170/104 164/98   BP Location: Left arm Right arm   Cuff Size: Adult    Pulse: 70    Resp: 16    Temp: 97 7 °F (36 5 °C)    TempSrc: Oral    SpO2: (!) 82%    Weight: 74 4 kg (164 lb)    Height: 5' 1" (1 549 m)        Physical Exam:  Physical Exam   Constitutional: He is oriented to person, place, and time  He appears well-developed and well-nourished  No distress  HENT:   Head: Normocephalic and atraumatic  Right Ear: External ear normal    Left Ear: External ear normal    Nose: Nose normal    Mouth/Throat: Oropharynx is clear and moist  No oropharyngeal exudate  Eyes: Pupils are equal, round, and reactive to light  Conjunctivae and EOM are normal  Right eye exhibits no discharge  Left eye exhibits no discharge  No scleral icterus  Neck: Normal range of motion  Neck supple  No JVD present  No tracheal deviation present  Cardiovascular: Normal rate, regular rhythm, normal heart sounds and intact distal pulses  Pulmonary/Chest: Breath sounds normal  No stridor   No respiratory distress  He has no wheezes  He has no rales  He exhibits no tenderness  Abdominal: Soft  Bowel sounds are normal  He exhibits no distension  There is no tenderness  There is no rebound and no guarding  Musculoskeletal: Normal range of motion  He exhibits edema  He exhibits no tenderness or deformity  Neurological: He is alert and oriented to person, place, and time  No cranial nerve deficit  Coordination normal    Skin: Skin is warm and dry  No rash noted  He is not diaphoretic  No erythema  No pallor  Left lower leg: significant varicose veins, with edema, scar to medial portion of lower leg   Psychiatric: He has a normal mood and affect  His behavior is normal  Judgment and thought content normal        Lab Results:                 No results found for: HGBA1C  Lab Results   Component Value Date    CKTOTAL 105 02/26/2014    TROPONINI <0 01 08/29/2018       Imaging Studies:     CT Chest:   FINDINGS:     VASCULAR STRUCTURES:  The ascending aorta is nonaneurysmal measuring a maximal 36 mm in transverse diameter  The proximal aortic arch is mildly ectatic measuring 38 mm  A fusiform descending thoracic aortic aneurysm measures a maximal 54 mm in   transverse diameter (previously 48)  The aneurysm terminates at the aortic hiatus  Classic branching anatomy of the aortic arch is present without stenoses at the origins of the great vessels      The abdominal aorta is mildly atherosclerotic and tortuous, but nonaneurysmal   2 right and one left renal artery are patent  The celiac artery, superior mesenteric artery, and inferior mesenteric artery are patent  The iliofemoral segments are mildly   atherosclerotic and tortuous, but patent  The internal iliac arteries are patent      OTHER FINDINGS:      CHEST:      HEART:  Normal cardiac size  No pericardial effusion      LUNGS:  Mild emphysematous changes with linear scarring in the right middle lobe  Bibasilar subsegmental atelectasis    1 mm right middle lobe pulmonary nodule      PLEURA: No pleural effusion      MEDIASTINUM AND PHYLLIS:  No mass or significant lymphadenopathy      CHEST WALL AND LOWER NECK: Normal      ABDOMEN     LIVER/BILIARY TREE:  Nonspecific 3 mm hypervascular focus in the inferior right lobe of the liver (3/132)      GALLBLADDER:  No calcified gallstones  No pericholecystic inflammatory change      SPLEEN:  Unremarkable  Normal size      PANCREAS:  Unremarkable      ADRENAL GLANDS:  Unremarkable      KIDNEYS/URETERS:  No solid renal mass  No hydronephrosis  Multiple bilateral nonobstructing renal calculi  The largest right calculus is 5 mm and the largest left calculus is 6 mm      PELVIS     REPRODUCTIVE ORGANS:  Unremarkable for patient's age      URINARY BLADDER:  Unremarkable      ADDITIONAL ABDOMINAL AND PELVIC STRUCTURES:      STOMACH AND BOWEL:  Extensive colonic diverticulosis without diverticulitis      ABDOMINOPELVIC CAVITY:  No pathologically enlarged mesenteric or retroperitoneal lymph nodes    No ascites or free intraperitoneal air      ABDOMINAL WALL/INGUINAL REGIONS:  Bilateral inguinal hernias containing small bowel on the right and fat on the left      OSSEOUS STRUCTURES:  No acute fracture or destructive osseous lesion      IMPRESSION:     Mild enlargement of the descending thoracic aortic aneurysm now measuring 54 mm     Emphysema     Stable 1 mm pulmonary nodule in the right middle lobe since 2014      Diverticulosis     Bilateral inguinal hernias with bowel on the right    reviewed in office    Assessment:  Patient Active Problem List    Diagnosis Date Noted    Edema of both legs 08/02/2018    Snoring 08/02/2018    Varicose veins of both lower extremities with pain 05/23/2018    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[address] ) 05/23/2018    Chronic respiratory failure with hypoxia ([address] ) 04/02/2018    Hypertension 02/01/2018    [facility]) [facility] 02/01/2018    Descending aortic aneurysm [facility] ) 02/01/2018    History of DVT (deep vein thrombosis) 02/01/2018    Benign essential hypertension 08/02/2017    Thoracic aortic aneurysm ([facility] ) 08/02/2017     Plan:     CTA chest/abdomen/pelvis performed prior to the visit today was reviewed  Enlargement in descending thoracic aorta from prior study  These findings were confirmed and shared with the patient today  Preoperative testing ordered, f/u with vascular surgery  Joshua Sanders was comfortable with our recommendations, and their questions were answered to their satisfaction  Thank you for allowing us to participate in the care of this patient  Aortic Aneurysm Instructions were provided to the patient as follows:    1  No lifting more than 50 pounds  2  Maintain a controlled blood pressure with a goal of 120/80- follow up PCP who manages his BP medications if no improvement, take medications as prescribed every morning, discussed risk associated with high BP   3  Follow up in Aortic Clinic as recommended with radiology follow up as instructed  4  Report to the ER or call 911 immediately with the following signs / symptoms: sudden onset of back pain, chest pain or shortness of breath  5  Tobacco cessation discussed      SIGNATURE: Marixa Duran  DATE: September 27, 2018  TIME: 10:29 AM

## 2018-10-11 ENCOUNTER — TELEPHONE (OUTPATIENT)
Dept: FAMILY MEDICINE CLINIC | Facility: CLINIC | Age: 83
End: 2018-10-11

## 2018-10-11 DIAGNOSIS — I10 ESSENTIAL HYPERTENSION: Primary | ICD-10-CM

## 2018-10-11 RX ORDER — METOPROLOL TARTRATE 50 MG/1
50 TABLET, FILM COATED ORAL EVERY 12 HOURS SCHEDULED
Qty: 60 TABLET | Refills: 1 | Status: SHIPPED | OUTPATIENT
Start: 2018-10-11 | End: 2018-12-13 | Stop reason: SDUPTHER

## 2018-10-15 ENCOUNTER — TELEPHONE (OUTPATIENT)
Dept: VASCULAR SURGERY | Facility: CLINIC | Age: 83
End: 2018-10-15

## 2018-10-15 ENCOUNTER — OFFICE VISIT (OUTPATIENT)
Dept: VASCULAR SURGERY | Facility: CLINIC | Age: 83
End: 2018-10-15
Payer: COMMERCIAL

## 2018-10-15 VITALS
BODY MASS INDEX: 31.15 KG/M2 | RESPIRATION RATE: 18 BRPM | HEIGHT: 61 IN | WEIGHT: 165 LBS | SYSTOLIC BLOOD PRESSURE: 162 MMHG | DIASTOLIC BLOOD PRESSURE: 98 MMHG | HEART RATE: 68 BPM | TEMPERATURE: 98.3 F

## 2018-10-15 DIAGNOSIS — I71.2 THORACIC AORTIC ANEURYSM WITHOUT RUPTURE (HCC): Primary | ICD-10-CM

## 2018-10-15 DIAGNOSIS — I72.4 POPLITEAL ARTERY ANEURYSM, BILATERAL (HCC): ICD-10-CM

## 2018-10-15 DIAGNOSIS — J96.11 CHRONIC RESPIRATORY FAILURE WITH HYPOXIA (HCC): ICD-10-CM

## 2018-10-15 PROCEDURE — 99215 OFFICE O/P EST HI 40 MIN: CPT | Performed by: SURGERY

## 2018-10-15 RX ORDER — SODIUM CHLORIDE 9 MG/ML
125 INJECTION, SOLUTION INTRAVENOUS CONTINUOUS
Status: CANCELLED | OUTPATIENT
Start: 2018-10-15

## 2018-10-15 RX ORDER — CLINDAMYCIN PHOSPHATE 900 MG/50ML
900 INJECTION INTRAVENOUS ONCE
Status: CANCELLED | OUTPATIENT
Start: 2018-10-15 | End: 2018-10-15

## 2018-10-15 NOTE — TELEPHONE ENCOUNTER
Patient scheduled for 11/06/2018 @8 am with Dr Isidor Severin in the [place] office  Faxed clearance form to [phone]

## 2018-10-15 NOTE — PROGRESS NOTES
Assessment/Plan:    Patient is a [de-identified] yo M w/ HTN, CAD, COPD, hx LLE DVT, small ascending aortic aneurysm, TAA, returns to coordinate TEVAR    Thoracic aortic aneurysm without rupture [facility])  -     Case request operating room: TEVAR - endovascular thoracic aortic aneurysm repair; Standing  -     Basic metabolic panel; Future  -     CBC and Platelet; Future  -     XR chest pa & lateral; Future  -     EKG 12 lead;  Future  -     Case request operating room: TEVAR - endovascular thoracic aortic aneurysm repair  -reviewed updated CTA of the chest which shows 55 mm (up from 46mm) TAA; this CT does include the abdomen and shows barely aneurysm infrarenal aorta  -reviewed AOIL which shows 3cm AAA  -discussed indications for treatment of TAA and have recommended surgical repair with TEVAR for which he is anatomically a good candidate; discussed risks and benefits of this procedure; he was unsure he wanted this procedure at last visit with CT surgery but would now life to go forward; consented by me using  phone  -labs, CXR, EKG, cards clearance, PAT    Chronic respiratory failure with hypoxia ([address] )  -COPD, on home O2, mostly at night; SOB in office today  -discussed risk of prolonged intubation or respiratory failure with general anesthesia    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HCC)  -     VAS lower limb arterial duplex, complete bilateral; Future  -reviewed LEADs which shows ectasiac of the CFA (1 1/1 0) and popliteals (0 8/0 9)  -discussed indications for treatment; will continue to monitor at this time  -next LEADs in 1 year     History of DVT (deep vein thrombosis)  Varicose veins of both lower extremities with pain  -     Compression Stocking  -hx of LLE DVT although no imaging for review; pt poor historian regarding circumstances and treatment  -does appear to have had LLE vein stripping in the past with recurrent varicosities and edema  -will start medical management program with daily compression use, leg elevation, activity, weight loss    Ascending aortic aneurysm (HCC)  -being managed by CT surgery; only 37mm at this time     Medications  -cont ASA/statin for life for best medical management    Other orders  -     Diet NPO; Sips with meds; Standing  -     Void on call to OR; Standing  -     Insert peripheral IV; Standing  -     Place sequential compression device; Standing  -     Insert urinary catheter (In Operative Area Only); Standing  -     Shower/scrub; Standing  -     Nursing communcation Clip hair (do not shave) prior to surgery; Standing  -     sodium chloride 0 9 % infusion; Infuse 125 mL/hr into a venous catheter continuous   -     clindamycin (CLEOCIN) IVPB (premix) 900 mg; Infuse 50 mL (900 mg total) into a venous catheter once       Operative Scheduling Information:    Hospital:  [facility]    Physician:  Theo    Surgery: TEVAR    Urgency:  Standard    Case Length:  Normal    Post-op Bed:  Per CT surgery    OR Table:  Discovery    Equipment Needs:  None  Rep - Miki Imperial    Medication Instructions:  Aspirin:   Continue (do not hold)    Hydration:  No      Subjective: "I am here to discuss my options for my aneurysm "     Patient ID: Vy Márquez is a 82 y o  male  Patient had a CTA of the chest, abdomen, and pelvis on 8/31/18  He saw Dr Ingrid Dudley on 9/27/18  He has had an increase in his appetite due to Spiriva  He has been having back pain in the morning about 3 times a week  He has had back pain since his hernia surgery 11/2011  He denies any abdomen pain  He continues to take  Lipitor daily  HPI:    Patient was found to have TAA and small ascending AA on imaging  He was initially referred here by CT surg to evaluate for possible AAA  He returns 2/2 finding of increased size of TAA, now indicated for repair  Patient currently unsure if he wants to proceed      Patient denies abdominal pain  Does complain of chronic mild back pain, unchanged recently    Deneis family hx of aneurysm  Accompanied by wife and grandson  Patient Luxem[place]ish only speaking  Interview conducted with Kyrgyz language interpretor phone  Denies claudication sxs  Denies wounds  Does have prominent varicose veins, L>R  He notes some LLE pain/aching  Reports hx of DVT (although denied this on last visit) and can't give details of circumstances or treatment  He also reports hx of varicose vein stripping  Has recurrence of varicosities  He does use CPAP at night and has SOB sometimes, but not today  The following portions of the patient's history were reviewed and updated as appropriate: allergies, current medications, past family history, past medical history, past social history, past surgical history and problem list     Review of Systems   Constitutional: Positive for appetite change  Negative for chills and fever  HENT: Positive for congestion, hearing loss and sneezing  Eyes: Positive for discharge and itching  Respiratory: Positive for cough, shortness of breath and wheezing  Cardiovascular: Positive for leg swelling  Negative for chest pain  Gastrointestinal: Negative for abdominal distention, abdominal pain, diarrhea, nausea and vomiting  Endocrine: Negative  Genitourinary: Negative  Musculoskeletal: Positive for back pain  Negative for arthralgias, myalgias and neck pain  Skin: Negative  Negative for wound  Allergic/Immunologic: Negative  Neurological: Positive for headaches  Negative for dizziness, speech difficulty, weakness and numbness  Hematological: Negative  Psychiatric/Behavioral: Negative  Objective:      /98 (BP Location: Right arm, Patient Position: Sitting)   Pulse 68   Temp 98 3 °F (36 8 °C) (Tympanic)   Resp 18   Ht 5' 1" (1 549 m)   Wt 74 8 kg (165 lb)   BMI 31 18 kg/m²          Physical Exam   Constitutional: He is oriented to person, place, and time  He appears well-developed and well-nourished     HENT: Head: Normocephalic and atraumatic  Eyes: Conjunctivae are normal    Neck: Normal range of motion  Neck supple  Cardiovascular: Normal rate, regular rhythm and normal heart sounds  No murmur heard  Pulses:       Radial pulses are 2+ on the right side, and 2+ on the left side  Femoral pulses are 3+ on the right side, and 3+ on the left side  Popliteal pulses are 2+ on the right side, and 2+ on the left side  Dorsalis pedis pulses are 2+ on the right side, and 2+ on the left side  Posterior tibial pulses are 2+ on the right side, and 2+ on the left side  No carotid bruit B   Pulmonary/Chest: Effort normal  He has wheezes (moderate expiratory wheezes B)  Abdominal: Soft  He exhibits no distension and no mass (do not feel pulsatile mass on exam)  There is no tenderness  There is no rebound  Musculoskeletal: Normal range of motion  He exhibits no edema  Neurological: He is alert and oriented to person, place, and time  Skin: Skin is warm and dry  Psychiatric: He has a normal mood and affect  His behavior is normal    Nursing note and vitals reviewed          Vitals:    10/15/18 1555 10/15/18 1556   BP: 158/84 162/98   BP Location: Left arm Right arm   Patient Position: Sitting Sitting   Pulse:  68   Resp: 18    Temp: 98 3 °F (36 8 °C)    TempSrc: Tympanic    Weight: 74 8 kg (165 lb)    Height: 5' 1" (1 549 m)        Patient Active Problem List   Diagnosis    Hypertension    COPD (chronic obstructive pulmonary disease) (HCC)    Descending aortic aneurysm (HCC)    History of DVT (deep vein thrombosis)    Benign essential hypertension    Thoracic aortic aneurysm (HCC)    Chronic respiratory failure with hypoxia (HCC)    Varicose veins of both lower extremities with pain    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HCC)    Edema of both legs    Snoring       Past Surgical History:   Procedure Laterality Date    HERNIA REPAIR      Inguinal    LEG SURGERY Bilateral     VARICOSE VEIN SURGERY         Family History   Problem Relation Age of Onset    No Known Problems Mother     No Known Problems Father     Cancer Sister     Diabetes Family     Hypertension Family        Social History     Social History    Marital status: /Civil Union     Spouse name: N/A    Number of children: N/A    Years of education: N/A     Occupational History    Not on file  Social History Main Topics    Smoking status: Former Smoker     Packs/day: 0 25     Years: 35 00     Start date: 1966     Quit date: 2001    Smokeless tobacco: Former User      Comment: TOBACCO USE   As per [employer]: Current some day smoker    Alcohol use No    Drug use: No    Sexual activity: Not Currently     Other Topics Concern    Not on file     Social History Narrative    ** Merged History Encounter **            Allergies   Allergen Reactions    Lisinopril     Penicillins Hives, Itching and Rash         Current Outpatient Prescriptions:     albuterol (2 5 mg/3 mL) 0 083 % nebulizer solution, Take 1 vial (2 5 mg total) by nebulization every 6 (six) hours as needed for wheezing or shortness of breath, Disp: 75 mL, Rfl: 1    albuterol (VENTOLIN HFA) 90 mcg/act inhaler, Inhale 2 puffs, Disp: , Rfl:     atorvastatin (LIPITOR) 10 mg tablet, Take 10 mg by mouth daily, Disp: , Rfl:     budesonide-formoterol (SYMBICORT) 160-4 5 mcg/act inhaler, Inhale 2 puffs 2 (two) times a day Rinse mouth after use , Disp: , Rfl:     furosemide (LASIX) 20 mg tablet, Take 1 tablet (20 mg total) by mouth daily, Disp: 5 tablet, Rfl: 0    losartan (COZAAR) 100 MG tablet, Take 1 tablet (100 mg total) by mouth daily, Disp: 90 tablet, Rfl: 0    metoprolol tartrate (LOPRESSOR) 50 mg tablet, Take 1 tablet (50 mg total) by mouth every 12 (twelve) hours, Disp: 60 tablet, Rfl: 1    pantoprazole (PROTONIX) 40 mg tablet, Take 1 tablet (40 mg total) by mouth daily, Disp: 90 tablet, Rfl: 1    tiotropium (SPIRIVA) 18 mcg inhalation capsule, Place 18 mcg into inhaler and inhale, Disp: , Rfl:     acetaminophen (TYLENOL) 325 mg tablet, Take 1-2 tablets by mouth every 4 (four) hours as needed, Disp: , Rfl:     aspirin (ECOTRIN LOW STRENGTH) 81 mg EC tablet, Take 81 mg by mouth daily, Disp: , Rfl:     mupirocin (BACTROBAN) 2 % nasal ointment, into each nostril 2 (two) times a day Start 5 days prior to surgery (Patient not taking: Reported on 10/15/2018 ), Disp: 10 g, Rfl: 0

## 2018-10-15 NOTE — PATIENT INSTRUCTIONS
1) Thoracic aortic aneurysm  -your aneurysm has grown and is now ~5 5cm in size  -I have recommended repair for your aneurysm using an endovascular approach      The information below is not quite the procedure you are having but is the closest information that I could find  Your aneurysm is in your chest and not the abdomen and so the procedure is different but using the same techniques  Reparación endovascular de aneurisma aórtico abdominal   LO QUE NECESITA SABER:   La reparación de aneurisma aórtico abdominal (AAA) es sj cirugía para componer un aneurisma en la aorta abdominal  Se produce un aneurisma aórtico abdominal cuando la aorta se debilita y se abulta garcía un globo  La aorta es un vaso sanguíneo al que sale del corazón y va al abdomen  Cuando el aneurisma es demasiado al, es posible que se rompa y sea necesario repararlo  GARCÍA PREPARARSE:   La semana antes de la cirugía:   · Traiga consigo a la abdifatah médica todos los envases de florin medicamentos o sj lista de florin medicamentos  Infórmele al médico si usted es alérgico a cualquier medicamento  Infórmele al médico si usted [place] productos herbales, suplementos nutricionales, o medicamentos de venta marily (sin receta médica)  · Pregúntele a rosa médico si usted tiene que dejar de usar la aspirina o algun otro medicamento prescribida o sin receta médica antes de rosa procedimiento o cirugía  · Es posible que deba samantha antibióticos antes de la [place]  Los antibióticos evitan o combaten las infecciones que son causadas por sj bacteria  · Es posible que deban [address] de jeanette antes de la [place]  Hable con rosa médico acera de estos u otros exámenes que usted podría necesitar  · Usted o un familiar [place] firmar un documento legal conocido garcía un formulario de autorización  [place] da rosa permiso a los médicos para hacer el procedimiento o [place]   Razia Grief los problemas que podrían ocurrir y las opciones que usted tiene  Asegúrese que todas florin preguntas marito contestadas antes de firmar pam formulario  · Anote la fecha, hora y lugar correctos de rosa [place]  La noche antes de la cirugía:   · Pregúntale a florin médicos sobre direcciones para comer y beber  El día de la cirugía:   · Pregúntele a rosa médico antes de samantha cualquier medicamento el día de rosa cirugía  Estos medicamentos incluyen insulina, píldoras diabéticas, píldoras de hipertensión, o píldoras de corazón  Deejay Lawer al [place] [id number] o los envases de los medicamentos que hanh  · Un anestesiólogo hablará con usted antes de rosa cirugía  Es posible que necesite medicamento para mantenerlo dormido o para adormecer alguna área de rosa cuerpo ramesh la cirugía  Infórmele a los médicos si usted o alguien en rosa mark ha tenido un problema con la anestesia anteriormente  QUÉ SUCEDERÁ:   Qué sucederá:  Es posible que le administren un medicamento por vía intravenosa para adormecerle o para que se duerma por completo  Los médicos harán sj incisión a cada lado de la alejandra  Colocarán sj vaina (tubo ofelia y hueco) por la piel y dentro de la arteria (vaso sanguíneo) en la alejandra  Los médicos podrán introducir distintos catéteres en la arteria por la vaina ramesh la cirugía  Introducirán un catéter con [facility] [place] endoprótesis (tubo de nacho) por la vaina hasta el AAA  El médico tomará radiografías o le hará otras pruebas para obtener imágenes de rosa aneurisma y endoprótesis ramesh la [place]  Inyectarán un tinte por el catéter para que las imágenes del aneurisma y la endoprótesis marito más claras  La endoprótesis está aplanada cuando los médicos la insertan en el aneurisma  [place] que se encuentra dentro del AAA, se la abre con un catéter que tiene un balón en el extremo  Después de cdirugía: Lo llevarán a la cristian de recuperación  Cubrirán el área por donde insertaron el catéter con un vendaje apretado  Pam vendaje mantiene el área limpia y seca para evitar que se infecte y [place]  Es posible que un médico quite el vendaje poco después de la cirugía para comprobar si está sangrando o si tiene moretones  No se levante de la cama hasta que rosa médico lo autorice  Cuando los médicos consideren que usted está listo, lo llevarán a rosa habitación en el hospital   COMUNÍQUESE CON ROSA médico SI:   · Usted tiene fiebre  · Los problemas que dieron motivo a la [place]  · Tiene preguntas o inquietudes acerca de la [facility]  BUSQUE ATENCIÓN INMEDIATA SI:   · Tiene dolor en el pecho o dificultad para respirar de forma repentina  · Usted ve jeanette en florin deposiciones  · Siente dolor repentino e intenso en el abdomen, la espalda o el costado  Es posible que el dolor baje por florin piernas, caderas e alejandra  · Rosa corazón late más rápido que de costumbre o puede sentir pulsaciones en el abdomen  · Rosa abdomen se siente yaw y tenso cuando lo toca  · Usted tiene náuseas y vómitos  · Tiene la piel pálida y sudorosa y de forma repentina se siente débil o ollie que se va a desmayar  RIESGOS:   · Usted podría sangrar más de lo esperado ramesh la cirugía, contraer sj infección o tener dificultad para respirar  Es posible que se dañen las arterias por las que se insertó el catéter  Puede que rosa alejandra esté amoratada  Ollie resultado de la [facility], se podrían formar coágulos de [place] o burbujas de aire en rosa jeanette  [place] podría causar un derrame cerebral o un ataque cardíaco  Los órganos de rosa cuerpo, ollie los [place], los pulmones y [place], podrían dañarse y dejar de funcionar  Rosa AAA podría abrirse ramesh la [facility]  Si los médicos no pueden reparar rosa AAA [employer] operación, es posible que deban hacer sj [place]  · Es posible que el AAA continúe creciendo con el [place], aún después de [place]  Puede que se produzca sj pérdida de jeanette entre el injerto y el AAA  [place] podría hacer que el AAA se volviera más débil y [place]   Roxy Neil causar que se rompa el AAA y pruitt janet podría estar en peligro  La endoprótesis podría salirse de pruitt lugar, romperse o doblarse y bloquear el flujo sanguíneo  Podría presentar sj pérdida de jeanette entre la aorta y otros vasos sanguíneos de pruitt abdomen  ACUERDOS SOBRE PRUITT CUIDADO:   Usted tiene el derecho de ayudar a planear pruitt cuidado  Aprenda todo lo que pueda sobre pruitt condición y flako darle tratamiento  Discuta florin opciones de tratamiento con florin médicos para decidir el cuidado que usted desea recibir  Usted siempre tiene el derecho de rechazar el tratamiento  © 2017 norm [employer]  Information is for End User's use only and may not be sold, redistributed or otherwise used for commercial purposes  All illustrations and images included in CareNotes® are the copyrighted property of A D A M , Inc  or Brian Fall  Esta información es sólo para uso en educación  Pruitt intención no es darle un consejo médico sobre enfermedades o tratamientos  Colsulte con pruitt Carolyn Antis farmacéutico antes de seguir cualquier régimen médico para saber si es seguro y efectivo para usted

## 2018-11-06 ENCOUNTER — OFFICE VISIT (OUTPATIENT)
Dept: CARDIOLOGY CLINIC | Facility: CLINIC | Age: 83
End: 2018-11-06
Payer: COMMERCIAL

## 2018-11-06 VITALS
SYSTOLIC BLOOD PRESSURE: 170 MMHG | BODY MASS INDEX: 31.34 KG/M2 | WEIGHT: 166 LBS | HEIGHT: 61 IN | HEART RATE: 72 BPM | DIASTOLIC BLOOD PRESSURE: 90 MMHG

## 2018-11-06 DIAGNOSIS — Z01.810 PREOP CARDIOVASCULAR EXAM: Primary | ICD-10-CM

## 2018-11-06 DIAGNOSIS — I71.2 THORACIC AORTIC ANEURYSM WITHOUT RUPTURE (HCC): ICD-10-CM

## 2018-11-06 DIAGNOSIS — I10 BENIGN ESSENTIAL HYPERTENSION: ICD-10-CM

## 2018-11-06 PROCEDURE — 93000 ELECTROCARDIOGRAM COMPLETE: CPT | Performed by: INTERNAL MEDICINE

## 2018-11-06 PROCEDURE — 99214 OFFICE O/P EST MOD 30 MIN: CPT | Performed by: INTERNAL MEDICINE

## 2018-11-06 RX ORDER — AMLODIPINE BESYLATE 5 MG/1
5 TABLET ORAL DAILY
Qty: 90 TABLET | Refills: 3 | Status: SHIPPED | OUTPATIENT
Start: 2018-11-06 | End: 2018-12-31 | Stop reason: HOSPADM

## 2018-11-06 NOTE — PROGRESS NOTES
Cardiology Follow Up    [facility]  [birth year]  [id number]  [address] [facility]  [address] [place] [address] [address] [place] [address]    1  Preop cardiovascular exam  POCT ECG    amLODIPine (NORVASC) 5 mg tablet   2  Benign essential hypertension     3  Thoracic aortic aneurysm without rupture (HCC)         Interval History:   Preoperative cardiac clearance for tevar  Patient is currently clinically stable denies any chest pain, he does dyspnea class 1-2 chronic in the setting of COPD on oxygen  No orthopnea no PND  Denies any syncope or presyncope  States been compliant with low-cholesterol diet  Compliant low-sodium diet, his blood pressure has been elevated at home  Today's is 170/90  Patient Active Problem List   Diagnosis    Hypertension    COPD (chronic obstructive pulmonary disease) ([address] )    Descending aortic aneurysm (HCC)    History of DVT (deep vein thrombosis)    Benign essential hypertension    Thoracic aortic aneurysm (HCC)    Chronic respiratory failure with hypoxia (HCC)    Varicose veins of both lower extremities with pain    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HCC)    Edema of both legs    Snoring     Past Medical History:   Diagnosis Date    Abdominal pain     Aneurysm, aorta, thoracic ([address] )     AND ABDOMINAL; KT 8/19/17 - FOLLOWS WITH DR MURILLO  PER OP REC FROM JULY 2017  NEEDS REPEAT CT AND ULTRASOUND IN 6 MONTHS  INTERVENTION IF SIZE > 5-51/2CM    COPD (chronic obstructive pulmonary disease) (HCC)     GERD (gastroesophageal reflux disease)     Hypertension     Ulcer     Varicose vein of leg      Social History     Social History    Marital status: /Civil Union     Spouse name: N/A    Number of children: N/A    Years of education: N/A     Occupational History    Not on file       Social History Main Topics    Smoking status: Former Smoker     Packs/day: 0 25 Years: 35 00     Start date: 1966     Quit date: 2001    Smokeless tobacco: Never Used      Comment: TOBACCO USE   As per [employer]: Current some day smoker    Alcohol use No    Drug use: No    Sexual activity: Not Currently     Other Topics Concern    Not on file     Social History Narrative    ** Merged History Encounter **           Family History   Problem Relation Age of Onset    No Known Problems Mother     No Known Problems Father     Cancer Sister     Diabetes Family     Hypertension Family      Past Surgical History:   Procedure Laterality Date    HERNIA REPAIR      Inguinal    LEG SURGERY Bilateral     VARICOSE VEIN SURGERY         Current Outpatient Prescriptions:     acetaminophen (TYLENOL) 325 mg tablet, Take 1-2 tablets by mouth every 4 (four) hours as needed, Disp: , Rfl:     albuterol (2 5 mg/3 mL) 0 083 % nebulizer solution, Take 1 vial (2 5 mg total) by nebulization every 6 (six) hours as needed for wheezing or shortness of breath, Disp: 75 mL, Rfl: 1    albuterol (VENTOLIN HFA) 90 mcg/act inhaler, Inhale 2 puffs, Disp: , Rfl:     aspirin (ECOTRIN LOW STRENGTH) 81 mg EC tablet, Take 81 mg by mouth daily, Disp: , Rfl:     atorvastatin (LIPITOR) 10 mg tablet, Take 10 mg by mouth daily, Disp: , Rfl:     furosemide (LASIX) 20 mg tablet, Take 1 tablet (20 mg total) by mouth daily, Disp: 5 tablet, Rfl: 0    metoprolol tartrate (LOPRESSOR) 50 mg tablet, Take 1 tablet (50 mg total) by mouth every 12 (twelve) hours, Disp: 60 tablet, Rfl: 1    mupirocin (BACTROBAN) 2 % nasal ointment, into each nostril 2 (two) times a day Start 5 days prior to surgery, Disp: 10 g, Rfl: 0    pantoprazole (PROTONIX) 40 mg tablet, Take 1 tablet (40 mg total) by mouth daily, Disp: 90 tablet, Rfl: 1    tiotropium (SPIRIVA) 18 mcg inhalation capsule, Place 18 mcg into inhaler and inhale, Disp: , Rfl:     amLODIPine (NORVASC) 5 mg tablet, Take 1 tablet (5 mg total) by mouth daily, Disp: 90 tablet, Rfl: 3   budesonide-formoterol (SYMBICORT) 160-4 5 mcg/act inhaler, Inhale 2 puffs 2 (two) times a day Rinse mouth after use , Disp: , Rfl:     losartan (COZAAR) 100 MG tablet, Take 1 tablet (100 mg total) by mouth daily (Patient not taking: Reported on 11/6/2018 ), Disp: 90 tablet, Rfl: 0  Allergies   Allergen Reactions    Lisinopril     Penicillins Hives, Itching and Rash       Labs:  Admission on 08/29/2018, Discharged on 08/29/2018   Component Date Value    WBC 08/29/2018 6 60     RBC 08/29/2018 5 12     Hemoglobin 08/29/2018 16 4     Hematocrit 08/29/2018 50 7     MCV 08/29/2018 99     MCH 08/29/2018 32 1     MCHC 08/29/2018 32 4     RDW 08/29/2018 14 5     MPV 08/29/2018 7 3*    Platelets 97/89/9342 159     Neutrophils Relative 08/29/2018 67*    Lymphocytes Relative 08/29/2018 15*    Monocytes Relative 08/29/2018 8     Eosinophils Relative 08/29/2018 10*    Basophils Relative 08/29/2018 1     Neutrophils Absolute 08/29/2018 4 40     Lymphocytes Absolute 08/29/2018 1 00     Monocytes Absolute 08/29/2018 0 50     Eosinophils Absolute 08/29/2018 0 60*    Basophils Absolute 08/29/2018 0 10     Troponin I 08/29/2018 <0 01     Sodium 08/29/2018 142     Potassium 08/29/2018 4 9     Chloride 08/29/2018 100     CO2 08/29/2018 38*    ANION GAP 08/29/2018 4*    BUN 08/29/2018 18     Creatinine 08/29/2018 0 89     Glucose 08/29/2018 101*    Calcium 08/29/2018 8 8     AST 08/29/2018 42     ALT 08/29/2018 25     Alkaline Phosphatase 08/29/2018 67     Total Protein 08/29/2018 7 4     Albumin 08/29/2018 3 8     Total Bilirubin 08/29/2018 0 80     eGFR 08/29/2018 80     Ventricular Rate 08/29/2018 70     Atrial Rate 08/29/2018 70     NE Interval 08/29/2018 158     QRSD Interval 08/29/2018 82     QT Interval 08/29/2018 400     QTC Interval 08/29/2018 432     P Axis 08/29/2018 58     QRS Axis 08/29/2018 66     T Wave Axis 08/29/2018 59    Admission on 07/15/2018, Discharged on 07/15/2018 Component Date Value    Sodium 07/15/2018 144     Potassium 07/15/2018 4 2     Chloride 07/15/2018 96*    CO2 07/15/2018 37*    ANION GAP 07/15/2018 11     BUN 07/15/2018 17     Creatinine 07/15/2018 0 88     Glucose 07/15/2018 134*    Calcium 07/15/2018 9 3     eGFR 07/15/2018 81     WBC 07/15/2018 11 90*    RBC 07/15/2018 5 04     Hemoglobin 07/15/2018 16 2     Hematocrit 07/15/2018 49 8     MCV 07/15/2018 99     MCH 07/15/2018 32 1     MCHC 07/15/2018 32 6     RDW 07/15/2018 13 5     MPV 07/15/2018 7 3*    Platelets 89/88/4132 154     Neutrophils Relative 07/15/2018 71*    Lymphocytes Relative 07/15/2018 16*    Monocytes Relative 07/15/2018 10     Eosinophils Relative 07/15/2018 3     Basophils Relative 07/15/2018 1     Neutrophils Absolute 07/15/2018 8 40*    Lymphocytes Absolute 07/15/2018 1 90     Monocytes Absolute 07/15/2018 1 20*    Eosinophils Absolute 07/15/2018 0 30     Basophils Absolute 07/15/2018 0 10     Color, UA 07/15/2018 Straw     Clarity, UA 07/15/2018 Clear     Specific Gravity, UA 07/15/2018 1 015     pH, UA 07/15/2018 8 0     Leukocytes, UA 07/15/2018 Negative     Nitrite, UA 07/15/2018 Negative     Protein, UA 07/15/2018 Negative     Glucose, UA 07/15/2018 Negative     Ketones, UA 07/15/2018 Negative     Bilirubin, UA 07/15/2018 Negative     Blood, UA 07/15/2018 Negative     UROBILINOGEN UA 07/15/2018 Negative     Ventricular Rate 07/15/2018 80     Atrial Rate 07/15/2018 80     MT Interval 07/15/2018 150     QRSD Interval 07/15/2018 84     QT Interval 07/15/2018 382     QTC Interval 07/15/2018 440     P Axis 07/15/2018 56     QRS Axis 07/15/2018 70     T Wave Axis 07/15/2018 45      Imaging: No results found  Review of Systems:  Review of Systems   Constitutional: Negative for activity change and fatigue  HENT: Negative for nosebleeds and trouble swallowing  Eyes: Negative for visual disturbance     Respiratory: Positive for cough, shortness of breath and wheezing  Negative for apnea and stridor  Cardiovascular: Negative for chest pain, palpitations and leg swelling  Gastrointestinal: Negative for abdominal pain and blood in stool  Endocrine: Negative for cold intolerance  Genitourinary: Negative for difficulty urinating and hematuria  Musculoskeletal: Positive for arthralgias  Negative for gait problem and myalgias  Skin: Negative for pallor and rash  Allergic/Immunologic: Negative for immunocompromised state  Neurological: Negative for dizziness, syncope and light-headedness  Hematological: Does not bruise/bleed easily  Psychiatric/Behavioral: Negative for sleep disturbance  Physical Exam:  Physical Exam   Constitutional: No distress (Chronically ill)  Eyes: No scleral icterus  Neck: No JVD present  Cardiovascular: Normal rate, regular rhythm and normal heart sounds  Exam reveals no gallop and no friction rub  No murmur heard  Pulmonary/Chest: Effort normal  No respiratory distress  He has wheezes  Neurological: He is alert  Skin: Skin is warm  He is not diaphoretic  Psychiatric: He has a normal mood and affect  Discussion/Summary:  Preoperative cardiac clearance  Patient had a pharmacological stress test last year that was negative for ischemia  An echocardiogram revealed normal left systolic function with mild left ventricular hypertrophy, stage I diastolic dysfunction and mild pulmonary hypertension with pulmonary artery pressures in the 40 systolic range he does have also an ascending aortic ectasia at 39 mm and infrarenal abdominal aneurysm at 3 9 cm  Will start amlodipine to improve blood pressure control  Patient is clear for surgery from the cardiac point of view  EKG is abnormal but unchanged

## 2018-11-07 ENCOUNTER — TELEPHONE (OUTPATIENT)
Dept: CARDIOLOGY CLINIC | Facility: CLINIC | Age: 83
End: 2018-11-07

## 2018-11-07 NOTE — TELEPHONE ENCOUNTER
Pt's son called needs clarification on dad's office visit yesterday  Pt not sure what Dr Ximena Adkins said  Did advise son that pt is cleared for surgery and gave the phone to call for further info on the surgery  Son verbally understands

## 2018-11-19 ENCOUNTER — TELEPHONE (OUTPATIENT)
Dept: CARDIOLOGY CLINIC | Facility: CLINIC | Age: 83
End: 2018-11-19

## 2018-11-19 PROBLEM — I71.20 THORACIC AORTIC ANEURYSM WITHOUT RUPTURE: Status: ACTIVE | Noted: 2018-11-19

## 2018-11-19 PROBLEM — I71.2 THORACIC AORTIC ANEURYSM WITHOUT RUPTURE: Status: ACTIVE | Noted: 2018-11-19

## 2018-11-19 NOTE — TELEPHONE ENCOUNTER
Pt called  anxious about when his surgery will be done  Translation done with language, however pt confused as to what is happening    Pt's son got on the phone, speaks Isha Gilmore, and states no one has called them yet about surgery date  I called vascular office and spoke with scheduling  They do have a date finally, but Aneesh Keller needs to call pt and set up OV with cardio/ vascular surgeon  Called pt's son back to let him know Aneesh Keller will call them for a pre-op appt soon  Tentatively surgery is scheduled for 12/27  Son repeated what I told him and will wait for the call from Aneesh Keller

## 2018-12-02 ENCOUNTER — HOSPITAL ENCOUNTER (EMERGENCY)
Facility: HOSPITAL | Age: 83
Discharge: HOME | End: 2018-12-02
Attending: EMERGENCY MEDICINE
Payer: COMMERCIAL

## 2018-12-02 ENCOUNTER — APPOINTMENT (EMERGENCY)
Dept: CT IMAGING | Facility: HOSPITAL | Age: 83
End: 2018-12-02
Payer: COMMERCIAL

## 2018-12-02 ENCOUNTER — APPOINTMENT (EMERGENCY)
Dept: RADIOLOGY | Facility: HOSPITAL | Age: 83
End: 2018-12-02
Payer: COMMERCIAL

## 2018-12-02 ENCOUNTER — TELEPHONE (OUTPATIENT)
Dept: OTHER | Facility: OTHER | Age: 83
End: 2018-12-02

## 2018-12-02 VITALS
SYSTOLIC BLOOD PRESSURE: 156 MMHG | DIASTOLIC BLOOD PRESSURE: 75 MMHG | OXYGEN SATURATION: 95 % | BODY MASS INDEX: 31.54 KG/M2 | WEIGHT: 166.9 LBS | RESPIRATION RATE: 18 BRPM | TEMPERATURE: 97.5 F | HEART RATE: 72 BPM

## 2018-12-02 DIAGNOSIS — R03.0 TRANSIENT ELEVATED BLOOD PRESSURE: Primary | ICD-10-CM

## 2018-12-02 DIAGNOSIS — I71.2 THORACIC AORTIC ANEURYSM WITHOUT RUPTURE (HCC): ICD-10-CM

## 2018-12-02 LAB
ALBUMIN SERPL BCP-MCNC: 4.3 G/DL (ref 3–5.2)
ALP SERPL-CCNC: 92 U/L (ref 43–122)
ALT SERPL W P-5'-P-CCNC: 46 U/L (ref 9–52)
ANION GAP SERPL CALCULATED.3IONS-SCNC: 6 MMOL/L (ref 5–14)
AST SERPL W P-5'-P-CCNC: 41 U/L (ref 17–59)
ATRIAL RATE: 65 BPM
BASE EX.OXY STD BLDV CALC-SCNC: 65.4 %
BASE EXCESS BLDV CALC-SCNC: 8.8 MMOL/L (ref -2.1–2.1)
BILIRUB SERPL-MCNC: 0.6 MG/DL
BUN SERPL-MCNC: 22 MG/DL (ref 5–25)
CALCIUM SERPL-MCNC: 9.3 MG/DL (ref 8.4–10.2)
CHLORIDE SERPL-SCNC: 98 MMOL/L (ref 97–108)
CO2 SERPL-SCNC: 36 MMOL/L (ref 22–30)
CREAT SERPL-MCNC: 0.97 MG/DL (ref 0.7–1.5)
EOSINOPHIL # BLD AUTO: 0.99 THOUSAND/UL (ref 0–0.4)
EOSINOPHIL NFR BLD MANUAL: 11 % (ref 0–6)
ERYTHROCYTE [DISTWIDTH] IN BLOOD BY AUTOMATED COUNT: 13.9 %
GFR SERPL CREATININE-BSD FRML MDRD: 73 ML/MIN/1.73SQ M
GLUCOSE SERPL-MCNC: 138 MG/DL (ref 70–99)
HCO3 BLDV-SCNC: 37.5 MMOL/L (ref 23–28)
HCT VFR BLD AUTO: 50.3 % (ref 41–53)
HGB BLD-MCNC: 15.8 G/DL (ref 13.5–17.5)
LYMPHOCYTES # BLD AUTO: 2.34 THOUSAND/UL (ref 0.5–4)
LYMPHOCYTES # BLD AUTO: 26 % (ref 20–50)
MCH RBC QN AUTO: 30.9 PG (ref 26–34)
MCHC RBC AUTO-ENTMCNC: 31.5 G/DL (ref 31–36)
MCV RBC AUTO: 98 FL (ref 80–100)
MONOCYTES # BLD AUTO: 0.72 THOUSAND/UL (ref 0.2–0.9)
MONOCYTES NFR BLD AUTO: 8 % (ref 1–10)
NEUTS SEG # BLD: 4.95 THOUSAND/UL (ref 1.8–7.8)
NEUTS SEG NFR BLD AUTO: 55 %
NT-PROBNP SERPL-MCNC: 268 PG/ML (ref 0–299)
O2 CT BLDV-SCNC: 14.3 ML/DL
P AXIS: 54 DEGREES
PCO2 BLDV: 68 MM HG (ref 41–51)
PH BLDV: 7.35 [PH] (ref 7.35–7.45)
PLATELET # BLD AUTO: 192 THOUSANDS/UL (ref 150–450)
PLATELET BLD QL SMEAR: ADEQUATE
PMV BLD AUTO: 7 FL (ref 8.9–12.7)
PO2 BLDV: 33 MM HG
POTASSIUM SERPL-SCNC: 4.5 MMOL/L (ref 3.6–5)
PR INTERVAL: 172 MS
PROT SERPL-MCNC: 7.9 G/DL (ref 5.9–8.4)
QRS AXIS: 65 DEGREES
QRSD INTERVAL: 86 MS
QT INTERVAL: 416 MS
QTC INTERVAL: 432 MS
RBC # BLD AUTO: 5.14 MILLION/UL (ref 4.5–5.9)
RBC MORPH BLD: NORMAL
SODIUM SERPL-SCNC: 140 MMOL/L (ref 137–147)
T WAVE AXIS: 43 DEGREES
TOTAL CELLS COUNTED SPEC: 100
TROPONIN I SERPL-MCNC: <0.01 NG/ML (ref 0–0.03)
VENTRICULAR RATE: 65 BPM
WBC # BLD AUTO: 9 THOUSAND/UL (ref 4.5–11)

## 2018-12-02 PROCEDURE — 80053 COMPREHEN METABOLIC PANEL: CPT | Performed by: EMERGENCY MEDICINE

## 2018-12-02 PROCEDURE — 36415 COLL VENOUS BLD VENIPUNCTURE: CPT | Performed by: EMERGENCY MEDICINE

## 2018-12-02 PROCEDURE — 83880 ASSAY OF NATRIURETIC PEPTIDE: CPT | Performed by: EMERGENCY MEDICINE

## 2018-12-02 PROCEDURE — 71045 X-RAY EXAM CHEST 1 VIEW: CPT

## 2018-12-02 PROCEDURE — 82805 BLOOD GASES W/O2 SATURATION: CPT | Performed by: EMERGENCY MEDICINE

## 2018-12-02 PROCEDURE — 93005 ELECTROCARDIOGRAM TRACING: CPT

## 2018-12-02 PROCEDURE — 93010 ELECTROCARDIOGRAM REPORT: CPT | Performed by: INTERNAL MEDICINE

## 2018-12-02 PROCEDURE — 99284 EMERGENCY DEPT VISIT MOD MDM: CPT

## 2018-12-02 PROCEDURE — 85007 BL SMEAR W/DIFF WBC COUNT: CPT | Performed by: EMERGENCY MEDICINE

## 2018-12-02 PROCEDURE — 71275 CT ANGIOGRAPHY CHEST: CPT

## 2018-12-02 PROCEDURE — 85027 COMPLETE CBC AUTOMATED: CPT | Performed by: EMERGENCY MEDICINE

## 2018-12-02 PROCEDURE — 84484 ASSAY OF TROPONIN QUANT: CPT | Performed by: EMERGENCY MEDICINE

## 2018-12-02 PROCEDURE — 74174 CTA ABD&PLVS W/CONTRAST: CPT

## 2018-12-02 RX ADMIN — IOHEXOL 100 ML: 350 INJECTION, SOLUTION INTRAVENOUS at 04:04

## 2018-12-02 NOTE — DISCHARGE INSTRUCTIONS
Please follow-up with the vascular surgeon, Dr Ortega as soon as possible for further evaluation  You will be receiving a phone call on Monday to possibly move your surgical intervention to a sooner date  For now please keep an eye on your blood pressure with a goal of blood pressure of 364 systolic or less, which is the higher number when your drinking a blood pressure at home  Return immediately if you developed any chest pain, nausea, abdominal pain, lightheadedness or any other concerning symptoms as this may signify worsening of your aneurysm  Thoracic Aortic Aneurysm   WHAT YOU NEED TO KNOW:   A thoracic aortic aneurysm (TAA) is a bulge in your aorta that occurs when the aorta's walls are weakened  The aorta is a large blood vessel that extends from your heart down the center of your chest         DISCHARGE INSTRUCTIONS:   Medicines:   · Blood pressure and cholesterol medicine: These may be given to help stop your TAA from growing  · Take your medicine as directed  Contact your healthcare provider if you think your medicine is not helping or if you have side effects  Tell him or her if you are allergic to any medicine  Keep a list of the medicines, vitamins, and herbs you take  Include the amounts, and when and why you take them  Bring the list or the pill bottles to follow-up visits  Carry your medicine list with you in case of an emergency  Follow up with your healthcare provider or vascular surgeon as directed: You may need to return to have your TAA checked  You may also need to have your blood pressure and cholesterol checked  Write down your questions so you remember to ask them during your visits  Self-care:   · Check your blood pressure as directed:  Keep a record of your blood pressure and bring it with you to follow-up visits  Ask your healthcare provider what your blood pressure should be and how to check it  High blood pressure can make your aneurysm worse      · Exercise:  Ask your healthcare provider to help you create an exercise plan  This may help lower blood pressure  · Eat a variety of healthy foods:  Healthy foods include fruits, vegetables, whole-grain breads, low-fat dairy products, beans, lean meats, and fish  You may be told to eat foods low in cholesterol or sodium (salt)  You also may be told to limit saturated and trans fats  Ask your healthcare provider if you need to be on a special diet  · Do not smoke: If you smoke, it is never too late to quit  Smoking increases your risk for TAA and heart disease  Ask your healthcare provider for information if you need help quitting  Contact your healthcare provider or vascular surgeon if:   · You have a fever  · You have new symptoms since your last appointment  · Your symptoms prevent you from doing your daily activities  · You have questions or concerns about your condition or care  Seek care immediately or call 911 if:   · You have nausea and are vomiting  · You have sudden chest, neck, back, or abdominal pain  · Your skin becomes pale and sweaty, or you feel very weak  · You are dizzy, or you faint  © 2017 [facility] Information is for End User's use only and may not be sold, redistributed or otherwise used for commercial purposes  All illustrations and images included in CareNotes® are the copyrighted property of A D A M , Inc  or [employer]  The above information is an  only  It is not intended as medical advice for individual conditions or treatments  Talk to your doctor, nurse or pharmacist before following any medical regimen to see if it is safe and effective for you  Aneurisma de la aorta torácica   LO QUE NECESITA SABER:   Un aneurisma de la aorta torácica, o AAT, es un abultamiento en la aorta que ocurre cuando las cox de la aorta se debilitan  La aorta es un vaso sanguíneo al que sale del corazón y pasa por el centro del pecho  INSTRUCCIONES SOBRE EL [facility]ITAL[facility]:   Medicamentos:   · Medicamento para la presión arterial y el colesterol:  Es posible que le administren estos medicamentos con el fin de evitar que rosa aneurisma de la aorta torácica continúe creciendo  · [place] florin medicamentos flako se le haya indicado  Consulte con rosa médico si usted ai que rosa medicamento no le está ayudando o si presenta efectos secundarios  Infórmele si es alérgico a cualquier medicamento  Mantenga sj lista actualizada de los [place], las vitaminas y los productos herbales que hanh  Incluya los siguientes datos de los medicamentos: cantidad, frecuencia y motivo de administración  Traiga con usted la lista o los envases de la píldoras a florin citas de seguimiento  Lleve la lista de los medicamentos con usted en michi de sj emergencia  Programe sj abdifatah con rosa médico o cirujano, según lo indicado:  Es posible que deba regresar para que se fijen en rosa aneurisma de la aorta torácica  Puede también que tengan que tomarle la presión arterial y comprobar florin niveles de colesterol  Anote florin preguntas para que se acuerde de hacerlas ramesh florin visitas  Cuidados personales:   · Tómese la presión arterial franki flako le indicaron:  Anote rosa presión arterial y traiga esta información a las consultas de seguimiento  Pregunte a rosa médico cuánto debería de tener de presión y cómo tomársela  La presión arterial elevada puede hacer que el aneurisma empeore  · Ejercicio:  Pida a rosa médico que lo ayude a crear un programa de ejercicio  Es posible que esto contribuya a bajar rosa presión arterial      · Consuma alimentos saludables y variados:  Los alimentos saludables incluyen frutas, verduras, pan integral, productos lácteos bajos en grasa, frijoles, [place] magras y pescado  Es posible que le digan que consuma alimentos con un bajo contenido de colesterol o sodio (sal)  Puede también que le indiquen que limite el consumo de grasas saturadas y grasas trans  Consulte con lawton médico si usted necesita seguir sj dieta especial     · No fume:  Si usted fuma, nunca es demasiado tarde para dejar de hacerlo  El riesgo de tener un aneurisma de la aorta torácica y enfermedades cardíacas aumenta cuando se fuma  Solicite información a lawton médico si usted necesita ayuda para dejar de fumar  Comuníquese con lawton médico o cirujano vascular si:   · Usted tiene fiebre  · Tiene nuevos síntomas desde la última consulta  · Cammie síntomas le impiden llevar a cabo cammie actividades diarias  · Usted tiene preguntas o inquietudes acerca de lawton condición o cuidado  Busque atención médica de inmediato o llame al 911 si:   · Usted tiene náuseas y vómitos  · Siente dolor repentino en el pecho, el aurelio, la espalda o el abdomen  · La piel se le pone pálida y sudorosa o se siente muy débil  · Usted se siente mareado o se desmaya  © 2017 [address] [employer] Information is for End User's use only and may not be sold, redistributed or otherwise used for commercial purposes  All illustrations and images included in CareNotes® are the copyrighted property of A D A M , Inc  or Brian Fall  Esta información es sólo para uso en educación  Lawton intención no es darle un consejo médico sobre enfermedades o tratamientos  Colsulte con lawton [employer] farmacéutico antes de seguir cualquier régimen médico para saber si es seguro y efectivo para usted

## 2018-12-02 NOTE — ED NOTES
Patient states the only medications he takes are what he brought with him       Drew Jimenez RN  12/02/18 [id number]

## 2018-12-02 NOTE — ED PROVIDER NOTES
History  Chief Complaint   Patient presents with    High Blood Pressure      at home per pt  via      32-year-old male with past medical history of small aortic aneurysm currently scheduled to be repaired, asthma, COPD presents for evaluation of high blood pressure  This evening patient states that he took his blood pressure as normal because he was told by his cardiologist, Dr Tiny Genao to check his blood pressure every night  He was asymptomatic at that time without any chest pain, shortness of breath, nausea, vomiting, dyspnea on exertion  His blood pressure was over 528 systolic which worried him and he came into the emergency department  He takes metoprolol and losartan for blood pressure and has been compliant with his medications  His blood pressure normally runs in 378 systolic so this was out of the ordinary for him  After he took his blood pressure and became worried he had mild epigastric discomfort without any associated shortness of breath nausea or diaphoresis, there was no associated chest pain  In the emergency department he denies any complaints, his blood pressure initially was 211/118 in his left arm, on recheck 704 systolic in left arm and 666 in right arm  Patient initially was saturating in mid 80s on room air though he denies it being short of breath and denies using oxygen at home and per report has Grade 1-2 COPD that he takes Spiriva and albuterol inhalers for  Patient's oxygen saturation improved to 93 percent on 2 liters nasal cannula  Initial EKG without any ischemic changes  Patient is a difficult historian as is his wife and even with a  patient not answering questions directly and most of his medical history and operative plan was obtained through chart review  Prior to Admission Medications   Prescriptions Last Dose Informant Patient Reported?  Taking?   acetaminophen (TYLENOL) 325 mg tablet Not Taking at Unknown time Child Yes No   Sig: Take 1-2 tablets by mouth every 4 (four) hours as needed   albuterol (2 5 mg/3 mL) 0 083 % nebulizer solution Not Taking at Unknown time Child No No   Sig: Take 1 vial (2 5 mg total) by nebulization every 6 (six) hours as needed for wheezing or shortness of breath   Patient not taking: Reported on 12/2/2018    albuterol (VENTOLIN HFA) 90 mcg/act inhaler Not Taking at Unknown time Child Yes No   Sig: Inhale 2 puffs   amLODIPine (NORVASC) 5 mg tablet Not Taking at Unknown time  No No   Sig: Take 1 tablet (5 mg total) by mouth daily   Patient not taking: Reported on 12/2/2018    aspirin (ECOTRIN LOW STRENGTH) 81 mg EC tablet Not Taking at Unknown time Child Yes No   Sig: Take 81 mg by mouth daily   atorvastatin (LIPITOR) 10 mg tablet Not Taking at Unknown time Child Yes No   Sig: Take 10 mg by mouth daily   budesonide-formoterol (SYMBICORT) 160-4 5 mcg/act inhaler 12/2/2018 at Unknown time Child Yes Yes   Sig: Inhale 2 puffs 2 (two) times a day Rinse mouth after use     furosemide (LASIX) 20 mg tablet Not Taking at Unknown time Child No No   Sig: Take 1 tablet (20 mg total) by mouth daily   Patient not taking: Reported on 12/2/2018    losartan (COZAAR) 100 MG tablet 12/1/2018 at 0800 Child No Yes   Sig: Take 1 tablet (100 mg total) by mouth daily   metoprolol tartrate (LOPRESSOR) 50 mg tablet 12/1/2018 at 0800 Spouse/Significant Other No Yes   Sig: Take 1 tablet (50 mg total) by mouth every 12 (twelve) hours   Patient taking differently: Take 75 mg by mouth every 12 (twelve) hours     mupirocin (BACTROBAN) 2 % nasal ointment Not Taking at Unknown time Child No No   Sig: into each nostril 2 (two) times a day Start 5 days prior to surgery   Patient not taking: Reported on 12/2/2018    pantoprazole (PROTONIX) 40 mg tablet Not Taking at Unknown time Child No No   Sig: Take 1 tablet (40 mg total) by mouth daily   Patient not taking: Reported on 12/2/2018    tiotropium (SPIRIVA) 18 mcg inhalation capsule 12/1/2018 at unknown Child Yes Yes   Sig: Place 18 mcg into inhaler and inhale      Facility-Administered Medications: None       Past Medical History:   Diagnosis Date    Abdominal pain     Aneurysm, aorta, thoracic ([address] )     AND ABDOMINAL; KT 8/19/17 - FOLLOWS WITH DR MURILLO  PER OP REC FROM JULY 2017  NEEDS REPEAT CT AND ULTRASOUND IN 6 MONTHS  INTERVENTION IF SIZE > 5-51/2CM    COPD (chronic obstructive pulmonary disease) (HCC)     GERD (gastroesophageal reflux disease)     Hypertension     Ulcer     Varicose vein of leg        Past Surgical History:   Procedure Laterality Date    HERNIA REPAIR      Inguinal    LEG SURGERY Bilateral     VARICOSE VEIN SURGERY         Family History   Problem Relation Age of Onset    No Known Problems Mother     No Known Problems Father     Cancer Sister     Diabetes Family     Hypertension Family      I have reviewed and agree with the history as documented  Social History   Substance Use Topics    Smoking status: Former Smoker     Packs/day: 0 00     Years: 35 00     Start date: 1966     Quit date: 2001    Smokeless tobacco: Never Used      Comment: TOBACCO USE  As per [employer]: Current some day smoker    Alcohol use No        Review of Systems   Unable to perform ROS: Other   Constitutional: Negative for appetite change, chills and fever  HENT: Negative for rhinorrhea and sore throat  Eyes: Negative for photophobia and visual disturbance  Respiratory: Negative for cough and shortness of breath  Cardiovascular: Negative for chest pain and palpitations  Gastrointestinal: Negative for abdominal pain and diarrhea  Genitourinary: Negative for dysuria, frequency and urgency  Skin: Negative for rash  Neurological: Negative for dizziness and weakness  All other systems reviewed and are negative  Physical Exam  Physical Exam   Constitutional: He is oriented to person, place, and time  He appears well-developed and well-nourished     HENT: Head: Normocephalic and atraumatic  Right Ear: External ear normal    Left Ear: External ear normal    Mouth/Throat: Oropharynx is clear and moist    Eyes: Pupils are equal, round, and reactive to light  Conjunctivae and EOM are normal    Neck: Normal range of motion  Neck supple  No JVD present  No tracheal deviation present  Cardiovascular: Normal rate, regular rhythm and normal heart sounds  Exam reveals no gallop and no friction rub  No murmur heard  Pulses equal in bilateral upper and lower extremities   Pulmonary/Chest: Effort normal  No stridor  No respiratory distress  He has wheezes  He has no rales  End-expiratory wheezes bilateral lung fields   Abdominal: Soft  He exhibits no distension and no mass  There is no tenderness  There is no rebound and no guarding  Musculoskeletal: Normal range of motion  He exhibits no edema  Neurological: He is alert and oriented to person, place, and time  No cranial nerve deficit  Skin: Skin is warm and dry  No rash noted  No erythema  No pallor  Psychiatric: He has a normal mood and affect  Nursing note and vitals reviewed        Vital Signs  ED Triage Vitals   Temperature Pulse Respirations Blood Pressure SpO2   12/02/18 0227 12/02/18 0227 12/02/18 0227 12/02/18 0227 12/02/18 0227   97 5 °F (36 4 °C) 70 20 (!) 211/118 (!) 81 %      Temp Source Heart Rate Source Patient Position - Orthostatic VS BP Location FiO2 (%)   12/02/18 0227 12/02/18 0248 12/02/18 0227 12/02/18 0227 --   Tympanic Monitor Lying Left arm       Pain Score       12/02/18 0248       No Pain           Vitals:    12/02/18 0401 12/02/18 0415 12/02/18 0500 12/02/18 0545   BP: 156/75      Pulse: 63 63 63 72   Patient Position - Orthostatic VS: Lying          Visual Acuity      ED Medications  Medications   iohexol (OMNIPAQUE) 350 MG/ML injection (SINGLE-DOSE) 100 mL (100 mL Intravenous Given 12/2/18 0404)       Diagnostic Studies  Results Reviewed     Procedure Component Value Units Date/Time    Blood gas, venous [[id number]]  (Abnormal) Collected:  12/02/18 0515    Lab Status:  Final result Specimen:  Blood from Arm, Left Updated:  12/02/18 0533     pH, Nabeel 7 350     pCO2, Nabeel 68 0 (H) mm Hg      pO2, Nabeel 33 0 (L) mm Hg      HCO3, Nabeel 37 5 (H) mmol/L      Base Excess, Nabeel 8 8 (H) mmol/L      O2 Content, Nabeel 14 3 ml/dL      O2 HGB, VENOUS 65 4 (L) %     BNP [[id number]]  (Normal) Collected:  12/02/18 0245    Lab Status:  Final result Specimen:  Blood from Line, Venous Updated:  12/02/18 0315     NT-proBNP 268 pg/mL     Troponin I [[id number]]  (Normal) Collected:  12/02/18 0245    Lab Status:  Final result Specimen:  Blood from Line, Venous Updated:  12/02/18 0315     Troponin I <0 01 ng/mL     Narrative:       Hemolysis    Comprehensive metabolic panel [[id number]]  (Abnormal) Collected:  12/02/18 0245    Lab Status:  Final result Specimen:  Blood from Line, Venous Updated:  12/02/18 0309     Sodium 140 mmol/L      Potassium 4 5 mmol/L      Chloride 98 mmol/L      CO2 36 (H) mmol/L      ANION GAP 6 mmol/L      BUN 22 mg/dL      Creatinine 0 97 mg/dL      Glucose 138 (H) mg/dL      Calcium 9 3 mg/dL      AST 41 U/L      ALT 46 U/L      Alkaline Phosphatase 92 U/L      Total Protein 7 9 g/dL      Albumin 4 3 g/dL      Total Bilirubin 0 60 mg/dL      eGFR 73 ml/min/1 73sq m     Narrative:       Hemolysis  National Kidney Disease Education Program recommendations are as follows:  GFR calculation is accurate only with a steady state creatinine  Chronic Kidney disease less than 60 ml/min/1 73 sq  meters  Kidney failure less than 15 ml/min/1 73 sq  meters      CBC and differential [[id number]]  (Abnormal) Collected:  12/02/18 0245    Lab Status:  Final result Specimen:  Blood from Line, Venous Updated:  12/02/18 0258     WBC 9 00 Thousand/uL      RBC 5 14 Million/uL      Hemoglobin 15 8 g/dL      Hematocrit 50 3 %      MCV 98 fL      MCH 30 9 pg      MCHC 31 5 g/dL      RDW 13 9 %      MPV 7 0 (L) fL Platelets 777 Thousands/uL                  CTA chest abdomen pelvis w wo contrast   Final Result by Nazia Santiago MD (12/02 1352)      Aneurysm of the descending thoracic aorta appears larger compared with August 31, 2018  Please see detailed discussion above  Surgical consultation is suggested  The prostate is mildly prominent, slightly heterogeneous, and contains multiple calcifications within  Clinical and laboratory correlation is recommended  The urinary bladder wall appears mildly thickened  Correlation with urinalysis is suggested  There is extensive colonic diverticulosis  There is no evidence of acute diverticulitis  There is an appendicolith within the distal tip of the appendix  There is no evidence of acute appendicitis  There is a small to moderate size right inguinal hernia which contains some mesenteric fat and bowel loops  There is no bowel obstruction  Small indeterminate left renal lesion  Please see discussion  Consider nonemergent outpatient follow-up  Bilateral nephrolithiasis  No hydronephrosis  COPD with emphysema  Atherosclerosis  Coronary artery disease  The study was marked in [facility] for immediate notification              Workstation performed: [id number]         XR chest portable    (Results Pending)              Procedures  Procedures       Phone Contacts  ED Phone Contact    ED Course  ED Course as of Dec 02 0705   Sun Dec 02, 2018   6166 Procedure Note: EKG  Date/Time: 12/02/18 2:43 AM   Performed by: Angelito Lambert  Authorized by: Angelito Lambert  Indications / Diagnosis: CP  ECG reviewed by me, the ED Provider: yes   The EKG demonstrates:  Rhythm: normal sinus  Intervals: normal intervals  Axis: normal axis  QRS/Blocks: normal QRS  ST Changes: No acute ST Changes, no STD/SHYANNE         0301 Patient resting comfortably, no current complaints    0313 On evaluation patient in no acute distress, resting comfortably blood pressure on recheck without any interventions 134/89    0410 Resting comfortably no acute distress    0451 Patient resting comfortably, no current complaints, saturating 95 percent on 2 liters nasal cannula, will trial him off of nasal cannula to see room air saturation    0457 Patient saturating 89-92 on room air currently no acute distress, no shortness of breath    0501 Currently on room air SpO2: (!) 89 %   0505 Patient remains asymptomatic but oxygen saturations now 84-88 on room air, will obtain VBG, will place patient on 2 liters nasal cannula    0518 After discussion with the wife despite denying it multiple times to multiple staff members, patient is on 2 L at home at all times and has an oxygen concentrator  On 2L pt is saturating 93-95 and does not appear to be in any acute distress , w/o complaints  7624 Per new information pt is on 2L NC at home at all times SpO2: (!) 89 %   0523 Paged Vascular surgery to discuss new findings    0531 Spoke to Dr Katey Lunsford, dicussed care results, pt is currently asymptomatic, , per vascular surgery patient will be contacted on Monday to move the surgical appointment up  Suggest BP target of 875 systolic, which I will relay to the patient  Careful return precautions will be given to return if any  chest pain, trouble breathing, elevated blood pressure despite compliance with medications  Per Pt his BP at home has been well control until this day  2173 Patient with normal pH, compensated pCO2, Nabeel: (!) 68 0   0542 Discussed careful return precautions with the patient, his wife and using a [employer] [employer]   All questions answered      7274 Patient will be awaiting his son to come with the oxygen concentrator from home as they forgot it when they left the house earlier this evening                                MDM  Number of Diagnoses or Management Options  Diagnosis management comments: 74-year-old male presents for evaluation of high blood pressure, initially hypoxic with improvement on 2 liters nasal cannula, initially complaining of mild epigastric discomfort without any associated symptoms however no other complaints  This resolved on its own without any medications, blood pressure better on recheck without any intervention  Initial EKG without ischemic changes  Will obtain lab work including BMP, will obtain chest x-ray and re-evaluate patient    CritCare Time    Disposition  Final diagnoses:   Transient elevated blood pressure   Thoracic aortic aneurysm without rupture ([address] )     Time reflects when diagnosis was documented in both MDM as applicable and the Disposition within this note     Time User Action Codes Description Comment    12/2/2018  5:43 AM Marcin Scott Add [R03 0] Transient elevated blood pressure     12/2/2018  5:43 AM Marcin Scott Add [I71 2] Thoracic aortic aneurysm without rupture [facility])       ED Disposition     ED Disposition Condition Comment    Discharge  [address] discharge to home/self care      Condition at discharge: Stable        Follow-up Information     Follow up With Specialties Details Why [facility] Emergency Department Emergency Medicine  If symptoms worsen [id number] [id number] [phone]    Evan Peterson MD Family Medicine  As needed MD [facility], Radiology Schedule an appointment as soon as possible for a visit  [facility] [address] [facility]  [phone]            Discharge Medication List as of 12/2/2018  5:59 AM      CONTINUE these medications which have NOT CHANGED    Details   budesonide-formoterol (SYMBICORT) 160-4 5 mcg/act inhaler Inhale 2 puffs 2 (two) times a day Rinse mouth after use , Historical Med      losartan (COZAAR) 100 MG tablet Take 1 tablet (100 mg total) by mouth daily, Starting Mon 8/6/2018, Normal      metoprolol tartrate (LOPRESSOR) 50 mg tablet Take 1 tablet (50 mg total) by mouth every 12 (twelve) hours, Starting Thu 10/11/2018, Normal      tiotropium (SPIRIVA) 18 mcg inhalation capsule Place 18 mcg into inhaler and inhale, Starting Tue 9/18/2018, Until Wed 9/18/2019, Historical Med      acetaminophen (TYLENOL) 325 mg tablet Take 1-2 tablets by mouth every 4 (four) hours as needed, Starting Tue 8/8/2017, Historical Med      albuterol (2 5 mg/3 mL) 0 083 % nebulizer solution Take 1 vial (2 5 mg total) by nebulization every 6 (six) hours as needed for wheezing or shortness of breath, Starting Tue 6/26/2018, Normal      albuterol (VENTOLIN HFA) 90 mcg/act inhaler Inhale 2 puffs, Starting Tue 8/8/2017, Historical Med      amLODIPine (NORVASC) 5 mg tablet Take 1 tablet (5 mg total) by mouth daily, Starting Tue 11/6/2018, Normal      aspirin (ECOTRIN LOW STRENGTH) 81 mg EC tablet Take 81 mg by mouth daily, Historical Med      atorvastatin (LIPITOR) 10 mg tablet Take 10 mg by mouth daily, Historical Med      furosemide (LASIX) 20 mg tablet Take 1 tablet (20 mg total) by mouth daily, Starting Thu 8/2/2018, Normal      mupirocin (BACTROBAN) 2 % nasal ointment into each nostril 2 (two) times a day Start 5 days prior to surgery, Starting Thu 9/27/2018, Normal      pantoprazole (PROTONIX) 40 mg tablet Take 1 tablet (40 mg total) by mouth daily, Starting Mon 8/20/2018, Normal           No discharge procedures on file      ED Provider  Electronically Signed by           Opal Curran MD  12/02/18 0094

## 2018-12-03 DIAGNOSIS — I10 HYPERTENSION, UNSPECIFIED TYPE: ICD-10-CM

## 2018-12-03 RX ORDER — LOSARTAN POTASSIUM 100 MG/1
TABLET ORAL
Qty: 90 TABLET | Refills: 1 | Status: SHIPPED | OUTPATIENT
Start: 2018-12-03 | End: 2018-12-31 | Stop reason: HOSPADM

## 2018-12-04 ENCOUNTER — TELEPHONE (OUTPATIENT)
Dept: FAMILY MEDICINE CLINIC | Facility: CLINIC | Age: 83
End: 2018-12-04

## 2018-12-05 NOTE — TELEPHONE ENCOUNTER
LVM for pt to contact office and inform us if pt is a pt here or somewhere else  If pt  Calls please ask and notate

## 2018-12-06 ENCOUNTER — OFFICE VISIT (OUTPATIENT)
Dept: CARDIAC SURGERY | Facility: CLINIC | Age: 83
End: 2018-12-06
Payer: COMMERCIAL

## 2018-12-06 VITALS
WEIGHT: 164 LBS | HEIGHT: 61 IN | DIASTOLIC BLOOD PRESSURE: 82 MMHG | OXYGEN SATURATION: 83 % | HEART RATE: 68 BPM | SYSTOLIC BLOOD PRESSURE: 132 MMHG | BODY MASS INDEX: 30.96 KG/M2 | TEMPERATURE: 97.7 F | RESPIRATION RATE: 14 BRPM

## 2018-12-06 DIAGNOSIS — I71.9 DESCENDING AORTIC ANEURYSM (HCC): Primary | ICD-10-CM

## 2018-12-06 PROCEDURE — 99214 OFFICE O/P EST MOD 30 MIN: CPT | Performed by: PHYSICIAN ASSISTANT

## 2018-12-06 PROCEDURE — 4040F PNEUMOC VAC/ADMIN/RCVD: CPT | Performed by: PHYSICIAN ASSISTANT

## 2018-12-06 RX ORDER — CIPROFLOXACIN 2 MG/ML
400 INJECTION, SOLUTION INTRAVENOUS ONCE
Status: CANCELLED | OUTPATIENT
Start: 2018-12-06 | End: 2018-12-06

## 2018-12-06 RX ORDER — VANCOMYCIN HYDROCHLORIDE 1 G/200ML
1000 INJECTION, SOLUTION INTRAVENOUS ONCE
Status: CANCELLED | OUTPATIENT
Start: 2018-12-06 | End: 2018-12-06

## 2018-12-06 RX ORDER — CHLORHEXIDINE GLUCONATE 0.12 MG/ML
15 RINSE ORAL ONCE
Status: CANCELLED | OUTPATIENT
Start: 2018-12-06 | End: 2018-12-06

## 2018-12-06 NOTE — PROGRESS NOTES
Follow up preop visit - Cardiothoracic Surgery   Brian Lockhart 82 y o  male MRN: [id number]    Physician Requesting Consult: No att  providers found    Reason for Consult / Principal Problem: Aortic aneurysm    History of Present Illness: Brian Lockhart is a 82y o  year old male who has known descending thoracoabdominal aortic aneurysm who presents for H&P update prior to TEVAR  Patient was last seen by our office in [place], 2018  Since that time he has been evaluated by Dr Ortega in October & surgery has been scheduled for 12/27/18  Since that time he has achieved cardiac clearance from Dr Brianne Avery  Changes to his PMH include an ED visit on 12/2/18 due to incidental HTN (SBP 200s) when taking home BP  In ED was evaluated w/ CTA CAP showing stable aneurysm  Bp came down on own & patient was discharged to home  States cardiac symptomatology is stable since his last visit  Past Medical History:  Past Medical History:   Diagnosis Date    Aneurysm, aorta, thoracic ([address] )     AND ABDOMINAL; KT 8/19/17 - FOLLOWS WITH DR MURLILO  PER OP REC FROM JULY 2017  NEEDS REPEAT CT AND ULTRASOUND IN 6 MONTHS   INTERVENTION IF SIZE > 5-51/2CM    Asthma     COPD (chronic obstructive pulmonary disease) ([address] )     Former tobacco use     GERD (gastroesophageal reflux disease)     History of DVT (deep vein thrombosis)     Hypertension     Oxygen dependent     2LNC    PVD (peripheral vascular disease) (HCC)     Ulcer     Varicose vein of leg      Past Surgical History:   Past Surgical History:   Procedure Laterality Date    INGUINAL HERNIA REPAIR Bilateral     VARICOSE VEIN SURGERY       Family History:  Family History   Problem Relation Age of Onset    No Known Problems Mother     No Known Problems Father     Cancer Sister     Diabetes Family     Hypertension Family      Social History:  History   Alcohol Use No     History   Drug Use No     History   Smoking Status    Former Smoker    Packs/day: 0 00    Years: 35 00    Start date: 1    Quit date: 2001   Smokeless Tobacco    Never Used     Comment: TOBACCO USE  As per [employer]: Current some day smoker       Home Medications:   Prior to Admission medications    Medication Sig Start Date End Date Taking? Authorizing Provider   acetaminophen (TYLENOL) 325 mg tablet Take 1-2 tablets by mouth every 4 (four) hours as needed 8/8/17  Yes Historical Provider, MD   albuterol (2 5 mg/3 mL) 0 083 % nebulizer solution Take 1 vial (2 5 mg total) by nebulization every 6 (six) hours as needed for wheezing or shortness of breath 6/26/18  Yes Zaid Bojorquez MD   albuterol (VENTOLIN HFA) 90 mcg/act inhaler Inhale 2 puffs 8/8/17  Yes Historical Provider, MD   atorvastatin (LIPITOR) 10 mg tablet Take 10 mg by mouth daily   Yes Historical Provider, MD   budesonide-formoterol (SYMBICORT) 160-4 5 mcg/act inhaler Inhale 2 puffs 2 (two) times a day Rinse mouth after use     Yes Historical Provider, MD   losartan (COZAAR) 100 MG tablet TAKE ONE TABLET BY MOUTH ONCE DAILY 12/3/18  Yes Zaid Bojorquez MD   metoprolol tartrate (LOPRESSOR) 50 mg tablet Take 1 tablet (50 mg total) by mouth every 12 (twelve) hours  Patient taking differently: Take 75 mg by mouth every 12 (twelve) hours   10/11/18  Yes Zaid Bojorquez MD   mupirocin [facility]) 2 % nasal ointment into each nostril 2 (two) times a day Start 5 days prior to surgery 9/27/18  Yes Kris Rasmussen PA-C   pantoprazole (PROTONIX) 40 mg tablet Take 1 tablet (40 mg total) by mouth daily 8/20/18  Yes Zaid Bojorquez MD   tiotropium [facility]) 18 mcg inhalation capsule Place 18 mcg into inhaler and inhale 9/18/18 9/18/19 Yes Historical Provider, MD   amLODIPine (NORVASC) 5 mg tablet Take 1 tablet (5 mg total) by mouth daily  Patient not taking: Reported on 12/2/2018 11/6/18   Ori Pill, MD   aspirin (ECOTRIN LOW STRENGTH) 81 mg EC tablet Take 81 mg by mouth daily    Historical Provider, MD   furosemide (LASIX) 20 mg tablet Take 1 tablet (20 mg total) by mouth daily  Patient not taking: Reported on 12/2/2018 8/2/18   Franky Bravo MD       Allergies: Allergies   Allergen Reactions    Lisinopril     Penicillins Hives, Itching and Rash       Review of Systems:  Review of Systems - History obtained from spouse and child and the patient  General ROS: negative  Respiratory ROS: negative  Cardiovascular ROS: negative  Gastrointestinal ROS: negative  Genito-Urinary ROS: negative  Musculoskeletal ROS: negative  Neurological ROS: negative  Dermatological ROS: negative  All other ROS negative    Vital Signs:     Vitals:    12/06/18 0900 12/06/18 0937   BP: 152/82 132/82   BP Location: Left arm Right arm   Cuff Size: Adult    Pulse: 68    Resp: 14    Temp: 97 7 °F (36 5 °C)    TempSrc: Oral    SpO2: (!) 83%    Weight: 74 4 kg (164 lb)    Height: 5' 1" (1 549 m)        Physical Exam:    General:    HEENT/NECK:  PERRLA  No jugular venous distention  Cardiac:RRR  No murmurs/rubs/gallops  No heaves/lifts on palpation     Pulmonary:  Good inspiration, equal expansion b/l, CTA b/l, no wheezes/rales/rhonchi  Abdomen:  Non-tender, Non-distended  Positive bowel sounds  Upper extremities: 2+ radial pulses; brisk capillary refill  Lower extremities: Extremities warm/dry  PT/DP pulses 2+ bilaterally  No edema B/L  Neuro: Alert and oriented X 3  Sensation is grossly intact  No focal deficits  Skin: Warm/Dry, without rashes or lesions      Lab Results:       Results from last 7 days  Lab Units 12/02/18  0245   WBC Thousand/uL 9 00   HEMOGLOBIN g/dL 15 8   HEMATOCRIT % 50 3   PLATELETS Thousands/uL 192       Results from last 7 days  Lab Units 12/02/18  0245   POTASSIUM mmol/L 4 5   CHLORIDE mmol/L 98   CO2 mmol/L 36*   BUN mg/dL 22   CREATININE mg/dL 0 97   CALCIUM mg/dL 9 3         No results found for: HGBA1C  Lab Results   Component Value Date    CKTOTAL 105 02/26/2014    TROPONINI <0 01 12/02/2018       Imaging Studies:     CTA CAP 12/2: aneurysm DTA, prostate mildly prominent/slightly heterogenous/multiple calcifications within, bladder mildly thickened, colonic diverticulosis, appendicolith w/i distal tip of appendix, small to mod right inguinal hernia w/ some mesenteric fat & bowel loops, small left renal lesion, b/l nephrolithiasis, COPD w/ emphysema, atherosclerosis, CAD    I have personally reviewed pertinent films in PACS    Assessment:  Patient Active Problem List    Diagnosis Date Noted    Thoracic aortic aneurysm without rupture ([facility] ) 11/19/2018    Edema of both legs 08/02/2018    Snoring 08/02/2018    Varicose veins of both lower extremities with pain 05/23/2018    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 ) 05/23/2018    Chronic respiratory failure with hypoxia ([facility] ) 04/02/2018    Hypertension 02/01/2018    COPD (chronic obstructive pulmonary disease) ([facility] ) 02/01/2018    Descending aortic aneurysm ([facility] ) 02/01/2018    History of DVT (deep vein thrombosis) 02/01/2018    Benign essential hypertension 08/02/2017    Thoracic aortic aneurysm ([facility] ) 08/02/2017     Descending aortic aneurysm; Ongoing TEVAR workup    Plan:  Risks and benefits of thoracic endovascular aortic repair were discussed in detail today with the patient  We have reviewed results all preoperative radiographic,  laboratory and vascular studies  They understand and wish to proceed with surgical intervention 12/27/18 with EVELYN Rousseau was comfortable with our recommendations, and their questions were answered to their satisfaction  Thank you for allowing us to participate in the care of this patient       Hodan Dhillon PA-C  DATE: December 6, 2018  TIME: 10:19 AM

## 2018-12-06 NOTE — PATIENT INSTRUCTIONS
PRE-OP INSTRUCTIONS  1  You will receive a phone call from the hospital between 2:00 PM and 8:00 PM the day prior to surgery to confirm arrival time and location  For surgery on Mondays, you will receive a call on Friday  2  Do not drink or eat anything after midnight the night before surgery  That includes no water, candy, gum, lozenges, Lifesavers, etc  We recommend you not eat any junk food, consume alcohol or smoke the night before surgery  3  Continue taking aspirin but only 81 mg daily  4  If you take Coumadin and/or Plavix, discontinue it 5 days before surgery  5  If you are diabetic, do not take any of your diabetic pills the morning of surgery  If you take Lantus insulin, you may take it at your regularly scheduled time the day before surgery  Do not take any other insulins the morning of surgery  6  The 2 nights before surgery, take a shower each night using the special antiseptic soap or soap sponges you received from the office or hospital  Christiana Aide your hair with regular shampoo and rinse completely before using the antiseptic sponges  Use the sponge to wash from your neck down, with special attention to the armpits and groin area  Do not use any other soap or cleanser on your skin  Do not use lotions, powder, deodorant or perfume of any kind on your skin after you shower  Use clean bed linens and wear clean pajamas after your shower  7  You will be prescribed Mupirocin nasal ointment  Apply to both nostrils twice a day for 5 days prior to surgery  8  Do not take a shower the morning of surgery; you'll be given a special bath at the hospital   9  Notify the CT surgery office if you develop a cold, sore throat, cough, fever or other health issues before your surgery  10  Other medication changes included the following: ok to continue all pre-op medication per Dr William Matute

## 2018-12-06 NOTE — LETTER
December 6, 2018     Grover Mario MD  [address]    Patient: Eliza Daly   YOB: 1935   Date of Visit: 12/6/2018       Dear Dr Jonatan Jung: Thank you for referring Eliza Daly to me for evaluation  Below are my notes for this consultation  If you have questions, please do not hesitate to call me  I look forward to following your patient along with you  Sincerely,        Evangelina Caal MD        CC: No Recipients  [place], [place]  12/6/2018 12:17 PM  Attested  Follow up preop visit - Cardiothoracic Surgery   Eliza Daly 82 y o  male MRN: [id number]    Physician Requesting Consult: No att  providers found    Reason for Consult / Principal Problem: Aortic aneurysm    History of Present Illness: Eliza Daly is a 82y o  year old male who has known descending thoracoabdominal aortic aneurysm who presents for H&P update prior to TEVAR  Patient was last seen by our office in [place], 2018  Since that time he has been evaluated by Dr Ortega in October & surgery has been scheduled for 12/27/18  Since that time he has achieved cardiac clearance from Dr Sinai Valdivia  Changes to his PMH include an ED visit on 12/2/18 due to incidental HTN (SBP 200s) when taking home BP  In ED was evaluated w/ CTA CAP showing stable aneurysm  Bp came down on own & patient was discharged to home  States cardiac symptomatology is stable since his last visit  Past Medical History:  Past Medical History:   Diagnosis Date    Aneurysm, aorta, thoracic ([address] )     AND ABDOMINAL; KT 8/19/17 - FOLLOWS WITH DR MURILLO  PER OP REC FROM JULY 2017  NEEDS REPEAT CT AND ULTRASOUND IN 6 MONTHS   INTERVENTION IF SIZE > 5-51/2CM    Asthma     COPD (chronic obstructive pulmonary disease) ([address] )     Former tobacco use     GERD (gastroesophageal reflux disease)     History of DVT (deep vein thrombosis)     Hypertension     Oxygen dependent     2LNC    [facility]     Ulcer     Varicose vein of leg      Past Surgical History:   Past Surgical History:   Procedure Laterality Date    INGUINAL HERNIA REPAIR Bilateral     VARICOSE VEIN SURGERY       Family History:  Family History   Problem Relation Age of Onset    No Known Problems Mother     No Known Problems Father     Cancer Sister     Diabetes Family     Hypertension Family      Social History:  History   Alcohol Use No     History   Drug Use No     History   Smoking Status    Former Smoker    Packs/day: 0 00    Years: 35 00    Start date: 1966    Quit date: 2001   Smokeless Tobacco    Never Used     Comment: TOBACCO USE  As per [employer]: Current some day smoker       Home Medications:   Prior to Admission medications    Medication Sig Start Date End Date Taking? Authorizing Provider   acetaminophen (TYLENOL) 325 mg tablet Take 1-2 tablets by mouth every 4 (four) hours as needed 8/8/17  Yes Historical Provider, MD   albuterol (2 5 mg/3 mL) 0 083 % nebulizer solution Take 1 vial (2 5 mg total) by nebulization every 6 (six) hours as needed for wheezing or shortness of breath 6/26/18  Yes Akosua Arciniega MD   albuterol (VENTOLIN HFA) 90 mcg/act inhaler Inhale 2 puffs 8/8/17  Yes Historical Provider, MD   atorvastatin (LIPITOR) 10 mg tablet Take 10 mg by mouth daily   Yes Historical Provider, MD   budesonide-formoterol (SYMBICORT) 160-4 5 mcg/act inhaler Inhale 2 puffs 2 (two) times a day Rinse mouth after use     Yes Historical Provider, MD   losartan (COZAAR) 100 MG tablet TAKE ONE TABLET BY MOUTH ONCE DAILY 12/3/18  Yes Akosua Arciniega MD   metoprolol tartrate (LOPRESSOR) 50 mg tablet Take 1 tablet (50 mg total) by mouth every 12 (twelve) hours  Patient taking differently: Take 75 mg by mouth every 12 (twelve) hours   10/11/18  Yes Akosua Arciniega MD   mupirocin [facility]) 2 % nasal ointment into each nostril 2 (two) times a day Start 5 days prior to surgery 9/27/18  Yes Kiana Hicks Nhung Conway PA-C   pantoprazole (PROTONIX) 40 mg tablet Take 1 tablet (40 mg total) by mouth daily 8/20/18  Yes Ben Meng MD   tiotropium [facility]) 18 mcg inhalation capsule Place 18 mcg into inhaler and inhale 9/18/18 9/18/19 Yes Historical Provider, MD   amLODIPine (NORVASC) 5 mg tablet Take 1 tablet (5 mg total) by mouth daily  Patient not taking: Reported on 12/2/2018 11/6/18   Carmela Vasquez MD   aspirin (ECOTRIN LOW STRENGTH) 81 mg EC tablet Take 81 mg by mouth daily    Historical Provider, MD   furosemide (LASIX) 20 mg tablet Take 1 tablet (20 mg total) by mouth daily  Patient not taking: Reported on 12/2/2018 8/2/18   Hellen Lynch MD       Allergies: Allergies   Allergen Reactions    Lisinopril     Penicillins Hives, Itching and Rash       Review of Systems:  Review of Systems - History obtained from spouse and child and the patient  General ROS: negative  Respiratory ROS: negative  Cardiovascular ROS: negative  Gastrointestinal ROS: negative  Genito-Urinary ROS: negative  Musculoskeletal ROS: negative  Neurological ROS: negative  Dermatological ROS: negative  All other ROS negative    Vital Signs:     Vitals:    12/06/18 0900 12/06/18 0937   BP: 152/82 132/82   BP Location: Left arm Right arm   Cuff Size: Adult    Pulse: 68    Resp: 14    Temp: 97 7 °F (36 5 °C)    TempSrc: Oral    SpO2: (!) 83%    Weight: 74 4 kg (164 lb)    Height: 5' 1" (1 549 m)        Physical Exam:    General:    HEENT/NECK:  PERRLA  No jugular venous distention  Cardiac:RRR  No murmurs/rubs/gallops  No heaves/lifts on palpation     Pulmonary:  Good inspiration, equal expansion b/l, CTA b/l, no wheezes/rales/rhonchi  Abdomen:  Non-tender, Non-distended  Positive bowel sounds  Upper extremities: 2+ radial pulses; brisk capillary refill  Lower extremities: Extremities warm/dry  PT/DP pulses 2+ bilaterally  No edema B/L  Neuro: Alert and oriented X 3  Sensation is grossly intact  No focal deficits    Skin: Warm/Dry, without rashes or lesions  Lab Results:       Results from last 7 days  Lab Units 12/02/18  0245   WBC Thousand/uL 9 00   HEMOGLOBIN g/dL 15 8   HEMATOCRIT % 50 3   PLATELETS Thousands/uL 192       Results from last 7 days  Lab Units 12/02/18  0245   POTASSIUM mmol/L 4 5   CHLORIDE mmol/L 98   CO2 mmol/L 36*   BUN mg/dL 22   CREATININE mg/dL 0 97   CALCIUM mg/dL 9 3         No results found for: HGBA1C  Lab Results   Component Value Date    CKTOTAL 105 02/26/2014    TROPONINI <0 01 12/02/2018       Imaging Studies:     CTA CAP 12/2: aneurysm DTA, prostate mildly prominent/slightly heterogenous/multiple calcifications within, bladder mildly thickened, colonic diverticulosis, appendicolith w/i distal tip of appendix, small to mod right inguinal hernia w/ some mesenteric fat & bowel loops, small left renal lesion, b/l nephrolithiasis, COPD w/ emphysema, atherosclerosis, CAD    I have personally reviewed pertinent films in PACS    Assessment:  Patient Active Problem List    Diagnosis Date Noted    [facility]acic [facility] aneurysm without rupture ([facility] ) 11/19/2018    Edema of both legs 08/02/2018    Snoring 08/02/2018    Varicose veins of both lower extremities with pain 05/23/2018    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 ) 05/23/2018    Chronic respiratory failure with hypoxia ([facility] ) 04/02/2018    Hypertension 02/01/2018    COPD (chronic obstructive pulmonary disease) [facility] ) 02/01/2018    [facility] 02/01/2018    History of DVT (deep vein thrombosis) 02/01/2018    Benign essential hypertension 08/02/2017    Thoracic aortic aneurysm ([facility] ) 08/02/2017     Descending aortic aneurysm; Ongoing TEVAR workup    Plan:  Risks and benefits of thoracic endovascular aortic repair were discussed in detail today with the patient  We have reviewed results all preoperative radiographic,  laboratory and vascular studies    They understand and wish to proceed with surgical intervention 12/27/18 with Dr Haro EVELYN Bueno was comfortable with our recommendations, and their questions were answered to their satisfaction  Thank you for allowing us to participate in the care of this patient  Kilo Rowley PA-C  DATE: December 6, 2018  TIME: 10:19 AM  Attestation signed by Doris Ryan MD at 12/6/2018 12:26 PM:  Patient seen and evaluated with 10 Ryne Love / JEREMIE  I agree with the above assessment and plan with the following additions  Patient is an 71-year-old man with a descending thoracic aorta aneurysm that has increased in size from 5-5 5 cm, meets indication for surgery at this point  I recommended TEVAR, I explained the procedure benefits, risks and possible complications  He understands and agrees to proceed with surgery

## 2018-12-06 NOTE — H&P
Follow up preop visit/H&P - Cardiothoracic Surgery   Rosette Mario 82 y o  male MRN: [id number]    Physician Requesting Consult: No att  providers found    Reason for Consult / Principal Problem: Aortic aneurysm    History of Present Illness: Rosette Mario is a 82y o  year old male who has known descending thoracoabdominal aortic aneurysm who presents for H&P update prior to TEVAR  Patient was last seen by our office in [place], 2018  Since that time he has been evaluated by Dr Ortega in October & surgery has been scheduled for 12/27/18  Since that time he has achieved cardiac clearance from Dr Randee Dunham  Changes to his PMH include an ED visit on 12/2/18 due to incidental HTN (SBP 200s) when taking home BP  In ED was evaluated w/ CTA CAP showing stable aneurysm  Bp came down on own & patient was discharged to home  States cardiac symptomatology is stable since his last visit  Past Medical History:  Past Medical History:   Diagnosis Date    Aneurysm, aorta, thoracic ([address] )     AND ABDOMINAL; KT 8/19/17 - FOLLOWS WITH DR MURILLO  PER OP REC FROM JULY 2017  NEEDS REPEAT CT AND ULTRASOUND IN 6 MONTHS   INTERVENTION IF SIZE > 5-51/2CM    Asthma     COPD (chronic obstructive pulmonary disease) ([address] )     Former tobacco use     GERD (gastroesophageal reflux disease)     History of DVT (deep vein thrombosis)     Hypertension     Oxygen dependent     2LNC    PVD (peripheral vascular disease) (HCC)     Ulcer     Varicose vein of leg      Past Surgical History:   Past Surgical History:   Procedure Laterality Date    INGUINAL HERNIA REPAIR Bilateral     VARICOSE VEIN SURGERY       Family History:  Family History   Problem Relation Age of Onset    No Known Problems Mother     No Known Problems Father     Cancer Sister     Diabetes Family     Hypertension Family      Social History:  History   Alcohol Use No     History   Drug Use No     History   Smoking Status    Former Smoker    Packs/day: 0 00  Years: 35 00    Start date: 1    Quit date: 2001   Smokeless Tobacco    Never Used     Comment: TOBACCO USE  As per [employer]: Current some day smoker       Home Medications:   Prior to Admission medications    Medication Sig Start Date End Date Taking? Authorizing Provider   acetaminophen (TYLENOL) 325 mg tablet Take 1-2 tablets by mouth every 4 (four) hours as needed 8/8/17  Yes Historical Provider, MD   albuterol (2 5 mg/3 mL) 0 083 % nebulizer solution Take 1 vial (2 5 mg total) by nebulization every 6 (six) hours as needed for wheezing or shortness of breath 6/26/18  Yes Nehemiah Lomas MD   albuterol (VENTOLIN HFA) 90 mcg/act inhaler Inhale 2 puffs 8/8/17  Yes Historical Provider, MD   atorvastatin (LIPITOR) 10 mg tablet Take 10 mg by mouth daily   Yes Historical Provider, MD   budesonide-formoterol (SYMBICORT) 160-4 5 mcg/act inhaler Inhale 2 puffs 2 (two) times a day Rinse mouth after use     Yes Historical Provider, MD   losartan (COZAAR) 100 MG tablet TAKE ONE TABLET BY MOUTH ONCE DAILY 12/3/18  Yes Nehemiah Lomas MD   metoprolol tartrate (LOPRESSOR) 50 mg tablet Take 1 tablet (50 mg total) by mouth every 12 (twelve) hours  Patient taking differently: Take 75 mg by mouth every 12 (twelve) hours   10/11/18  Yes Nehemiah Lomas MD   pirocin [facility]) 2 % nasal ointment into each nostril 2 (two) times a day Start 5 days prior to surgery 9/27/18  Yes Ute Hood PA-C   pantoprazole (PROTONIX) 40 mg tablet Take 1 tablet (40 mg total) by mouth daily 8/20/18  Yes Nehemiah Lomas MD   tiotropium [facility]) 18 mcg inhalation capsule Place 18 mcg into inhaler and inhale 9/18/18 9/18/19 Yes Historical Provider, MD   amLODIPine (NORVASC) 5 mg tablet Take 1 tablet (5 mg total) by mouth daily  Patient not taking: Reported on 12/2/2018 11/6/18   Zak Vazquez MD   aspirin (ECOTRIN LOW STRENGTH) 81 mg EC tablet Take 81 mg by mouth daily    Historical Provider, MD   furosemide (LASIX) 20 mg tablet Take 1 tablet (20 mg total) by mouth daily  Patient not taking: Reported on 12/2/2018 8/2/18   Maggi Robledo MD       Allergies: Allergies   Allergen Reactions    Lisinopril     Penicillins Hives, Itching and Rash       Review of Systems:  Review of Systems - History obtained from spouse and child and the patient  General ROS: negative  Respiratory ROS: negative  Cardiovascular ROS: negative  Gastrointestinal ROS: negative  Genito-Urinary ROS: negative  Musculoskeletal ROS: negative  Neurological ROS: negative  Dermatological ROS: negative  All other ROS negative    Vital Signs:     Vitals:    12/06/18 0900 12/06/18 0937   BP: 152/82 132/82   BP Location: Left arm Right arm   Cuff Size: Adult    Pulse: 68    Resp: 14    Temp: 97 7 °F (36 5 °C)    TempSrc: Oral    SpO2: (!) 83%    Weight: 74 4 kg (164 lb)    Height: 5' 1" (1 549 m)        Physical Exam:    General:    HEENT/NECK:  PERRLA  No jugular venous distention  Cardiac:RRR  No murmurs/rubs/gallops  No heaves/lifts on palpation     Pulmonary:  Good inspiration, equal expansion b/l, CTA b/l, no wheezes/rales/rhonchi  Abdomen:  Non-tender, Non-distended  Positive bowel sounds  Upper extremities: 2+ radial pulses; brisk capillary refill  Lower extremities: Extremities warm/dry  PT/DP pulses 2+ bilaterally  No edema B/L  Neuro: Alert and oriented X 3  Sensation is grossly intact  No focal deficits  Skin: Warm/Dry, without rashes or lesions      Lab Results:       Results from last 7 days  Lab Units 12/02/18  0245   WBC Thousand/uL 9 00   HEMOGLOBIN g/dL 15 8   HEMATOCRIT % 50 3   PLATELETS Thousands/uL 192       Results from last 7 days  Lab Units 12/02/18  0245   POTASSIUM mmol/L 4 5   CHLORIDE mmol/L 98   CO2 mmol/L 36*   BUN mg/dL 22   CREATININE mg/dL 0 97   CALCIUM mg/dL 9 3         No results found for: HGBA1C  Lab Results   Component Value Date    CKTOTAL 105 02/26/2014    TROPONINI <0 01 12/02/2018       Imaging Studies:     CTA CAP 12/2: aneurysm DTA, prostate mildly prominent/slightly heterogenous/multiple calcifications within, bladder mildly thickened, colonic diverticulosis, appendicolith w/i distal tip of appendix, small to mod right inguinal hernia w/ some mesenteric fat & bowel loops, small left renal lesion, b/l nephrolithiasis, COPD w/ emphysema, atherosclerosis, CAD    I have personally reviewed pertinent films in PACS    Assessment:  Patient Active Problem List    Diagnosis Date Noted    Thor[facility] aneurysm without rupture [facility] ) 11/19/2018    Edema of both legs 08/02/2018    Snoring 08/02/2018    Varicose veins of both lower extremities with pain 05/23/2018    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[facility] ) 05/23/2018    Chronic respiratory failure with hypoxia ([facility] ) 04/02/2018    Hypertension 02/01/2018    COPD (chronic obstructive pulmonary disease) ([facility] ) 02/01/2018    Descending aortic aneurysm ([facility] ) 02/01/2018    History of DVT (deep vein thrombosis) 02/01/2018    Benign essential hypertension 08/02/2017    Thoracic aortic aneurysm ([facility] ) 08/02/2017     Descending aortic aneurysm; Ongoing TEVAR workup    Plan:  Risks and benefits of thoracic endovascular aortic repair were discussed in detail today with the patient  We have reviewed results all preoperative radiographic,  laboratory and vascular studies  They understand and wish to proceed with surgical intervention 12/27/18 with EVELYN Barragan was comfortable with our recommendations, and their questions were answered to their satisfaction  Thank you for allowing us to participate in the care of this patient       Ahsan Tobar PA-C  DATE: December 6, 2018  TIME: 10:19 AM

## 2018-12-07 ENCOUNTER — TELEPHONE (OUTPATIENT)
Dept: PREADMISSION TESTING | Facility: HOSPITAL | Age: 83
End: 2018-12-07

## 2018-12-10 NOTE — PRE-PROCEDURE INSTRUCTIONS
Pre-Surgery Instructions:   Medication Instructions    albuterol (2 5 mg/3 mL) 0 083 % nebulizer solution epic    albuterol (VENTOLIN HFA) 90 mcg/act inhaler epic    amLODIPine (NORVASC) 5 mg tablet epic    aspirin (ECOTRIN LOW STRENGTH) 81 mg EC tablet Patient was instructed by Physician and understands   atorvastatin (LIPITOR) 10 mg tablet epic    budesonide-formoterol (SYMBICORT) 160-4 5 mcg/act inhaler epic    furosemide (LASIX) 20 mg tablet epic do not take 12/27    losartan (COZAAR) 100 MG tablet Do not take 12/26, 12/27    metoprolol tartrate (LOPRESSOR) 50 mg tablet epic    mupirocin (BACTROBAN) 2 % nasal ointment epic    mupirocin (BACTROBAN) 2 % nasal ointment epic    pantoprazole (PROTONIX) 40 mg tablet epic    tiotropium (SPIRIVA) 18 mcg inhalation capsule epic   Education Index     Med Instructions Troubleshoot   ACE/ARB Med Class     Do not take this medication the day before and the morning of the day of surgery/procedure  Diuretic Med Class     Continue this medication up to the evening before surgery/procedure, but do not take the morning of the day of surgery  Acetaminophen Med Class     Continue to take this medication on your normal schedule  If this is an oral medication and you take it in the morning, then you may take this medicine with a sip of water  ASA Med Class: Aspirin     Should be discontinued at least one week prior to planned operation, unless specifically stated otherwise by surgical service  Your Surgeon may have patient stop taking aspirin up to a week before surgery if having intracranial, middle ear, posterior eye, spine surgery or prostate surgery  [Patients taking aspirin for coronary stents should be reviewed by an anesthesiologist in the optimization clinic    Please do not discontinue aspirin in patients with coronary stents unless given specific permission to do so by the cardiologist who prescribed medication ]   If your surgeon approves please continue to take this medication on your normal schedule  You may take this medication on the morning of your surgery with a sip of water  Beta blocker Med Class     Continue to take this heart medication on your normal schedule  If this is an oral medication and you take it in the morning, then you may take this medicine with a sip of water  Calcium Channel Blocker Med Class     Continue to take this heart medication on your normal schedule  If this is an oral medication and you take it in the morning, then you may take this medicine with a sip of water  Inhalational Med Class     Continue to take these inhaler medications on your normal schedule up to and including the day of surgery  Statin Med Class     Continue to take this medication on your normal schedule  If this is an oral medication and you take it in the morning, then you may take this medicine with a sip of water  Pre procedure instructions reviewed with son at [facility]  Pt given instructions from Dr William Matute office for bathing and medications  All questions answered

## 2018-12-13 DIAGNOSIS — I10 ESSENTIAL HYPERTENSION: ICD-10-CM

## 2018-12-13 RX ORDER — METOPROLOL TARTRATE 50 MG/1
50 TABLET, FILM COATED ORAL EVERY 12 HOURS SCHEDULED
Qty: 60 TABLET | Refills: 1 | Status: SHIPPED | OUTPATIENT
Start: 2018-12-13 | End: 2018-12-31 | Stop reason: HOSPADM

## 2018-12-17 ENCOUNTER — APPOINTMENT (OUTPATIENT)
Dept: LAB | Facility: HOSPITAL | Age: 83
End: 2018-12-17
Attending: SURGERY
Payer: COMMERCIAL

## 2018-12-17 DIAGNOSIS — I71.2 THORACIC AORTIC ANEURYSM WITHOUT RUPTURE (HCC): ICD-10-CM

## 2018-12-17 LAB
ANION GAP SERPL CALCULATED.3IONS-SCNC: 4 MMOL/L (ref 4–13)
BUN SERPL-MCNC: 18 MG/DL (ref 5–25)
CALCIUM SERPL-MCNC: 9.5 MG/DL (ref 8.3–10.1)
CHLORIDE SERPL-SCNC: 102 MMOL/L (ref 100–108)
CO2 SERPL-SCNC: 39 MMOL/L (ref 21–32)
CREAT SERPL-MCNC: 1.2 MG/DL (ref 0.6–1.3)
ERYTHROCYTE [DISTWIDTH] IN BLOOD BY AUTOMATED COUNT: 13.1 % (ref 11.6–15.1)
GFR SERPL CREATININE-BSD FRML MDRD: 56 ML/MIN/1.73SQ M
GLUCOSE P FAST SERPL-MCNC: 95 MG/DL (ref 65–99)
HCT VFR BLD AUTO: 55.4 % (ref 36.5–49.3)
HGB BLD-MCNC: 16.5 G/DL (ref 12–17)
MCH RBC QN AUTO: 31 PG (ref 26.8–34.3)
MCHC RBC AUTO-ENTMCNC: 29.8 G/DL (ref 31.4–37.4)
MCV RBC AUTO: 104 FL (ref 82–98)
PLATELET # BLD AUTO: 161 THOUSANDS/UL (ref 149–390)
PMV BLD AUTO: 9 FL (ref 8.9–12.7)
POTASSIUM SERPL-SCNC: 4.3 MMOL/L (ref 3.5–5.3)
RBC # BLD AUTO: 5.32 MILLION/UL (ref 3.88–5.62)
SODIUM SERPL-SCNC: 145 MMOL/L (ref 136–145)
WBC # BLD AUTO: 8.17 THOUSAND/UL (ref 4.31–10.16)

## 2018-12-17 PROCEDURE — 85027 COMPLETE CBC AUTOMATED: CPT

## 2018-12-17 PROCEDURE — 80048 BASIC METABOLIC PNL TOTAL CA: CPT

## 2018-12-17 PROCEDURE — 36415 COLL VENOUS BLD VENIPUNCTURE: CPT

## 2018-12-21 DIAGNOSIS — I71.9 DESCENDING AORTIC ANEURYSM (HCC): Primary | ICD-10-CM

## 2018-12-24 ENCOUNTER — APPOINTMENT (OUTPATIENT)
Dept: LAB | Facility: HOSPITAL | Age: 83
DRG: 219 | End: 2018-12-24
Payer: COMMERCIAL

## 2018-12-24 ENCOUNTER — ANESTHESIA EVENT (OUTPATIENT)
Dept: PERIOP | Facility: HOSPITAL | Age: 83
DRG: 219 | End: 2018-12-24
Payer: COMMERCIAL

## 2018-12-24 DIAGNOSIS — I71.9 DESCENDING AORTIC ANEURYSM (HCC): ICD-10-CM

## 2018-12-24 LAB
ABO GROUP BLD: NORMAL
APTT PPP: 30 SECONDS (ref 26–38)
BILIRUB UR QL STRIP: NEGATIVE
BLD GP AB SCN SERPL QL: NEGATIVE
CHOLEST SERPL-MCNC: 130 MG/DL (ref 50–200)
CLARITY UR: CLEAR
COLOR UR: YELLOW
EST. AVERAGE GLUCOSE BLD GHB EST-MCNC: 157 MG/DL
GLUCOSE UR STRIP-MCNC: NEGATIVE MG/DL
HBA1C MFR BLD: 7.1 % (ref 4.2–6.3)
HDLC SERPL-MCNC: 39 MG/DL (ref 40–60)
HGB UR QL STRIP.AUTO: NEGATIVE
INR PPP: 1.04 (ref 0.86–1.17)
KETONES UR STRIP-MCNC: NEGATIVE MG/DL
LDLC SERPL CALC-MCNC: 68 MG/DL (ref 0–100)
LEUKOCYTE ESTERASE UR QL STRIP: NEGATIVE
NITRITE UR QL STRIP: NEGATIVE
NONHDLC SERPL-MCNC: 91 MG/DL
PH UR STRIP.AUTO: 7 [PH] (ref 4.5–8)
PROT UR STRIP-MCNC: NEGATIVE MG/DL
PROTHROMBIN TIME: 13.7 SECONDS (ref 11.8–14.2)
RH BLD: POSITIVE
SP GR UR STRIP.AUTO: 1.01 (ref 1–1.03)
SPECIMEN EXPIRATION DATE: NORMAL
TRIGL SERPL-MCNC: 115 MG/DL
UROBILINOGEN UR QL STRIP.AUTO: 0.2 E.U./DL

## 2018-12-24 PROCEDURE — 87081 CULTURE SCREEN ONLY: CPT

## 2018-12-24 PROCEDURE — 86900 BLOOD TYPING SEROLOGIC ABO: CPT

## 2018-12-24 PROCEDURE — 85730 THROMBOPLASTIN TIME PARTIAL: CPT

## 2018-12-24 PROCEDURE — 86850 RBC ANTIBODY SCREEN: CPT

## 2018-12-24 PROCEDURE — 81003 URINALYSIS AUTO W/O SCOPE: CPT | Performed by: THORACIC SURGERY (CARDIOTHORACIC VASCULAR SURGERY)

## 2018-12-24 PROCEDURE — 80061 LIPID PANEL: CPT

## 2018-12-24 PROCEDURE — 83036 HEMOGLOBIN GLYCOSYLATED A1C: CPT

## 2018-12-24 PROCEDURE — 86901 BLOOD TYPING SEROLOGIC RH(D): CPT

## 2018-12-24 PROCEDURE — 36415 COLL VENOUS BLD VENIPUNCTURE: CPT

## 2018-12-24 PROCEDURE — 86920 COMPATIBILITY TEST SPIN: CPT

## 2018-12-24 PROCEDURE — 85610 PROTHROMBIN TIME: CPT

## 2018-12-24 PROCEDURE — 87147 CULTURE TYPE IMMUNOLOGIC: CPT

## 2018-12-26 LAB
MRSA NOSE QL CULT: ABNORMAL
MRSA NOSE QL CULT: ABNORMAL

## 2018-12-27 ENCOUNTER — APPOINTMENT (INPATIENT)
Dept: RADIOLOGY | Facility: HOSPITAL | Age: 83
DRG: 219 | End: 2018-12-27
Payer: COMMERCIAL

## 2018-12-27 ENCOUNTER — HOSPITAL ENCOUNTER (INPATIENT)
Facility: HOSPITAL | Age: 83
LOS: 4 days | Discharge: HOME HEALTH | DRG: 219 | End: 2018-12-31
Attending: SURGERY | Admitting: THORACIC SURGERY (CARDIOTHORACIC VASCULAR SURGERY)
Payer: COMMERCIAL

## 2018-12-27 ENCOUNTER — ANESTHESIA (OUTPATIENT)
Dept: PERIOP | Facility: HOSPITAL | Age: 83
DRG: 219 | End: 2018-12-27
Payer: COMMERCIAL

## 2018-12-27 ENCOUNTER — APPOINTMENT (OUTPATIENT)
Dept: RADIOLOGY | Facility: HOSPITAL | Age: 83
DRG: 219 | End: 2018-12-27
Attending: SURGERY
Payer: COMMERCIAL

## 2018-12-27 DIAGNOSIS — I71.2 THORACIC AORTIC ANEURYSM WITHOUT RUPTURE (HCC): Primary | ICD-10-CM

## 2018-12-27 DIAGNOSIS — I71.2 ANEURYSM, AORTA, THORACIC (HCC): ICD-10-CM

## 2018-12-27 DIAGNOSIS — J44.9 CHRONIC OBSTRUCTIVE PULMONARY DISEASE, UNSPECIFIED COPD TYPE (HCC): ICD-10-CM

## 2018-12-27 LAB
ANION GAP SERPL CALCULATED.3IONS-SCNC: 5 MMOL/L (ref 4–13)
BASE EXCESS BLDA CALC-SCNC: 9 MMOL/L (ref -2–3)
BUN SERPL-MCNC: 12 MG/DL (ref 5–25)
CA-I BLD-SCNC: 1.11 MMOL/L (ref 1.12–1.32)
CALCIUM SERPL-MCNC: 8 MG/DL (ref 8.3–10.1)
CHLORIDE SERPL-SCNC: 104 MMOL/L (ref 100–108)
CO2 SERPL-SCNC: 32 MMOL/L (ref 21–32)
CREAT SERPL-MCNC: 0.92 MG/DL (ref 0.6–1.3)
FIO2 GAS DIL.REBREATH: 60 L
GFR SERPL CREATININE-BSD FRML MDRD: 78 ML/MIN/1.73SQ M
GLUCOSE SERPL-MCNC: 106 MG/DL (ref 65–140)
GLUCOSE SERPL-MCNC: 127 MG/DL (ref 65–140)
GLUCOSE SERPL-MCNC: 136 MG/DL (ref 65–140)
GLUCOSE SERPL-MCNC: 160 MG/DL (ref 65–140)
GLUCOSE SERPL-MCNC: 169 MG/DL (ref 65–140)
HCO3 BLDA-SCNC: 34.5 MMOL/L (ref 22–28)
HCT VFR BLD AUTO: 45.7 % (ref 36.5–49.3)
HCT VFR BLD CALC: 43 % (ref 36.5–49.3)
HGB BLD-MCNC: 14 G/DL (ref 12–17)
HGB BLDA-MCNC: 14.6 G/DL (ref 12–17)
KCT BLD-ACNC: 212 SEC (ref 89–137)
KCT BLD-ACNC: 260 SEC (ref 89–137)
PCO2 BLD: 36 MMOL/L (ref 21–32)
PCO2 BLD: 49.7 MM HG (ref 36–44)
PH BLD: 7.45 [PH] (ref 7.35–7.45)
PO2 BLD: 100 MM HG (ref 75–129)
POTASSIUM BLD-SCNC: 3.9 MMOL/L (ref 3.5–5.3)
POTASSIUM SERPL-SCNC: 4 MMOL/L (ref 3.5–5.3)
SAO2 % BLD FROM PO2: 98 % (ref 95–98)
SODIUM BLD-SCNC: 142 MMOL/L (ref 136–145)
SODIUM SERPL-SCNC: 141 MMOL/L (ref 136–145)
SPECIMEN SOURCE: ABNORMAL

## 2018-12-27 PROCEDURE — 94760 N-INVAS EAR/PLS OXIMETRY 1: CPT

## 2018-12-27 PROCEDURE — 84295 ASSAY OF SERUM SODIUM: CPT

## 2018-12-27 PROCEDURE — 82948 REAGENT STRIP/BLOOD GLUCOSE: CPT

## 2018-12-27 PROCEDURE — C1760 CLOSURE DEV, VASC: HCPCS | Performed by: SURGERY

## 2018-12-27 PROCEDURE — 34713 PERQ ACCESS & CLSR FEM ART: CPT | Performed by: THORACIC SURGERY (CARDIOTHORACIC VASCULAR SURGERY)

## 2018-12-27 PROCEDURE — 82803 BLOOD GASES ANY COMBINATION: CPT

## 2018-12-27 PROCEDURE — C1769 GUIDE WIRE: HCPCS | Performed by: SURGERY

## 2018-12-27 PROCEDURE — C1894 INTRO/SHEATH, NON-LASER: HCPCS | Performed by: SURGERY

## 2018-12-27 PROCEDURE — 75957: CPT

## 2018-12-27 PROCEDURE — 82330 ASSAY OF CALCIUM: CPT

## 2018-12-27 PROCEDURE — 93005 ELECTROCARDIOGRAM TRACING: CPT

## 2018-12-27 PROCEDURE — 85014 HEMATOCRIT: CPT

## 2018-12-27 PROCEDURE — B31PYZZ FLUOROSCOPY OF THORACO-ABDOMINAL AORTA USING OTHER CONTRAST: ICD-10-PCS | Performed by: RADIOLOGY

## 2018-12-27 PROCEDURE — 36200 PLACE CATHETER IN AORTA: CPT | Performed by: SURGERY

## 2018-12-27 PROCEDURE — 75957 CHG ENDOVASC REPAIR THOR AORTA EXCL SUBCLAVIAN: CPT | Performed by: RADIOLOGY

## 2018-12-27 PROCEDURE — 33881 EVASC RPR TA NDGFT XCOV LSA: CPT | Performed by: THORACIC SURGERY (CARDIOTHORACIC VASCULAR SURGERY)

## 2018-12-27 PROCEDURE — 85014 HEMATOCRIT: CPT | Performed by: PHYSICIAN ASSISTANT

## 2018-12-27 PROCEDURE — 94640 AIRWAY INHALATION TREATMENT: CPT

## 2018-12-27 PROCEDURE — 82947 ASSAY GLUCOSE BLOOD QUANT: CPT

## 2018-12-27 PROCEDURE — 85018 HEMOGLOBIN: CPT | Performed by: PHYSICIAN ASSISTANT

## 2018-12-27 PROCEDURE — 84132 ASSAY OF SERUM POTASSIUM: CPT

## 2018-12-27 PROCEDURE — 02VW3DZ RESTRICTION OF THORACIC AORTA, DESCENDING WITH INTRALUMINAL DEVICE, PERCUTANEOUS APPROACH: ICD-10-PCS | Performed by: THORACIC SURGERY (CARDIOTHORACIC VASCULAR SURGERY)

## 2018-12-27 PROCEDURE — 80048 BASIC METABOLIC PNL TOTAL CA: CPT | Performed by: PHYSICIAN ASSISTANT

## 2018-12-27 PROCEDURE — 76937 US GUIDE VASCULAR ACCESS: CPT

## 2018-12-27 PROCEDURE — 99232 SBSQ HOSP IP/OBS MODERATE 35: CPT | Performed by: ANESTHESIOLOGY

## 2018-12-27 PROCEDURE — 34713 PERQ ACCESS & CLSR FEM ART: CPT | Performed by: SURGERY

## 2018-12-27 PROCEDURE — 36245 INS CATH ABD/L-EXT ART 1ST: CPT | Performed by: SURGERY

## 2018-12-27 PROCEDURE — 02HV33Z INSERTION OF INFUSION DEVICE INTO SUPERIOR VENA CAVA, PERCUTANEOUS APPROACH: ICD-10-PCS | Performed by: ANESTHESIOLOGY

## 2018-12-27 PROCEDURE — 33881 EVASC RPR TA NDGFT XCOV LSA: CPT | Performed by: RADIOLOGY

## 2018-12-27 PROCEDURE — 71045 X-RAY EXAM CHEST 1 VIEW: CPT

## 2018-12-27 PROCEDURE — C1874 STENT, COATED/COV W/DEL SYS: HCPCS | Performed by: SURGERY

## 2018-12-27 PROCEDURE — 85347 COAGULATION TIME ACTIVATED: CPT

## 2018-12-27 PROCEDURE — 34713 PERQ ACCESS & CLSR FEM ART: CPT | Performed by: RADIOLOGY

## 2018-12-27 PROCEDURE — 94002 VENT MGMT INPAT INIT DAY: CPT

## 2018-12-27 PROCEDURE — 33881 EVASC RPR TA NDGFT XCOV LSA: CPT | Performed by: SURGERY

## 2018-12-27 DEVICE — IMPLANTABLE DEVICE: Type: IMPLANTABLE DEVICE | Site: THORACIC | Status: FUNCTIONAL

## 2018-12-27 DEVICE — PERCLOSE PROGLIDE™ SUTURE-MEDIATED CLOSURE SYSTEM
Type: IMPLANTABLE DEVICE | Site: ARTERIAL | Status: FUNCTIONAL
Brand: PERCLOSE PROGLIDE™

## 2018-12-27 DEVICE — CLOSURE DEVICE MYNX ACE 6F/7FR: Type: IMPLANTABLE DEVICE | Status: FUNCTIONAL

## 2018-12-27 RX ORDER — CIPROFLOXACIN 2 MG/ML
400 INJECTION, SOLUTION INTRAVENOUS ONCE
Status: DISCONTINUED | OUTPATIENT
Start: 2018-12-27 | End: 2018-12-27 | Stop reason: HOSPADM

## 2018-12-27 RX ORDER — CHLORHEXIDINE GLUCONATE 0.12 MG/ML
15 RINSE ORAL EVERY 12 HOURS SCHEDULED
Status: DISCONTINUED | OUTPATIENT
Start: 2018-12-27 | End: 2018-12-28

## 2018-12-27 RX ORDER — MIDAZOLAM HYDROCHLORIDE 1 MG/ML
INJECTION INTRAMUSCULAR; INTRAVENOUS AS NEEDED
Status: DISCONTINUED | OUTPATIENT
Start: 2018-12-27 | End: 2018-12-27 | Stop reason: SURG

## 2018-12-27 RX ORDER — ACETAMINOPHEN 325 MG/1
650 TABLET ORAL EVERY 4 HOURS PRN
Status: DISCONTINUED | OUTPATIENT
Start: 2018-12-27 | End: 2018-12-31 | Stop reason: HOSPADM

## 2018-12-27 RX ORDER — NEOSTIGMINE METHYLSULFATE 1 MG/ML
0.03 INJECTION INTRAVENOUS ONCE
Status: COMPLETED | OUTPATIENT
Start: 2018-12-27 | End: 2018-12-27

## 2018-12-27 RX ORDER — ALBUTEROL SULFATE 2.5 MG/3ML
2.5 SOLUTION RESPIRATORY (INHALATION) EVERY 6 HOURS PRN
Status: DISCONTINUED | OUTPATIENT
Start: 2018-12-27 | End: 2018-12-27

## 2018-12-27 RX ORDER — VANCOMYCIN HYDROCHLORIDE 1 G/200ML
1000 INJECTION, SOLUTION INTRAVENOUS ONCE
Status: DISCONTINUED | OUTPATIENT
Start: 2018-12-27 | End: 2018-12-27 | Stop reason: HOSPADM

## 2018-12-27 RX ORDER — METOPROLOL TARTRATE 50 MG/1
50 TABLET, FILM COATED ORAL EVERY 12 HOURS SCHEDULED
Status: DISCONTINUED | OUTPATIENT
Start: 2018-12-27 | End: 2018-12-31 | Stop reason: HOSPADM

## 2018-12-27 RX ORDER — ATORVASTATIN CALCIUM 10 MG/1
10 TABLET, FILM COATED ORAL
Status: DISCONTINUED | OUTPATIENT
Start: 2018-12-27 | End: 2018-12-31 | Stop reason: HOSPADM

## 2018-12-27 RX ORDER — HYDROMORPHONE HCL/PF 1 MG/ML
1 SYRINGE (ML) INJECTION
Status: DISCONTINUED | OUTPATIENT
Start: 2018-12-27 | End: 2018-12-28

## 2018-12-27 RX ORDER — BISACODYL 10 MG
10 SUPPOSITORY, RECTAL RECTAL DAILY PRN
Status: DISCONTINUED | OUTPATIENT
Start: 2018-12-27 | End: 2018-12-31 | Stop reason: HOSPADM

## 2018-12-27 RX ORDER — POLYETHYLENE GLYCOL 3350 17 G/17G
17 POWDER, FOR SOLUTION ORAL DAILY
Status: DISCONTINUED | OUTPATIENT
Start: 2018-12-27 | End: 2018-12-31 | Stop reason: HOSPADM

## 2018-12-27 RX ORDER — LEVALBUTEROL 1.25 MG/.5ML
1.25 SOLUTION, CONCENTRATE RESPIRATORY (INHALATION)
Status: DISCONTINUED | OUTPATIENT
Start: 2018-12-27 | End: 2018-12-31 | Stop reason: HOSPADM

## 2018-12-27 RX ORDER — SODIUM CHLORIDE 450 MG/100ML
20 INJECTION, SOLUTION INTRAVENOUS CONTINUOUS
Status: DISCONTINUED | OUTPATIENT
Start: 2018-12-27 | End: 2018-12-27

## 2018-12-27 RX ORDER — ROCURONIUM BROMIDE 10 MG/ML
INJECTION, SOLUTION INTRAVENOUS AS NEEDED
Status: DISCONTINUED | OUTPATIENT
Start: 2018-12-27 | End: 2018-12-27 | Stop reason: SURG

## 2018-12-27 RX ORDER — IPRATROPIUM BROMIDE AND ALBUTEROL SULFATE 2.5; .5 MG/3ML; MG/3ML
3 SOLUTION RESPIRATORY (INHALATION) ONCE
Status: COMPLETED | OUTPATIENT
Start: 2018-12-27 | End: 2018-12-27

## 2018-12-27 RX ORDER — SODIUM CHLORIDE, SODIUM LACTATE, POTASSIUM CHLORIDE, CALCIUM CHLORIDE 600; 310; 30; 20 MG/100ML; MG/100ML; MG/100ML; MG/100ML
INJECTION, SOLUTION INTRAVENOUS CONTINUOUS PRN
Status: DISCONTINUED | OUTPATIENT
Start: 2018-12-27 | End: 2018-12-27 | Stop reason: SURG

## 2018-12-27 RX ORDER — CHLORHEXIDINE GLUCONATE 0.12 MG/ML
15 RINSE ORAL ONCE
Status: COMPLETED | OUTPATIENT
Start: 2018-12-27 | End: 2018-12-27

## 2018-12-27 RX ORDER — FENTANYL CITRATE 50 UG/ML
50 INJECTION, SOLUTION INTRAMUSCULAR; INTRAVENOUS ONCE
Status: DISCONTINUED | OUTPATIENT
Start: 2018-12-27 | End: 2018-12-27

## 2018-12-27 RX ORDER — ONDANSETRON 2 MG/ML
4 INJECTION INTRAMUSCULAR; INTRAVENOUS EVERY 6 HOURS PRN
Status: DISCONTINUED | OUTPATIENT
Start: 2018-12-27 | End: 2018-12-31 | Stop reason: HOSPADM

## 2018-12-27 RX ORDER — LIDOCAINE HYDROCHLORIDE 10 MG/ML
INJECTION, SOLUTION INFILTRATION; PERINEURAL AS NEEDED
Status: DISCONTINUED | OUTPATIENT
Start: 2018-12-27 | End: 2018-12-27 | Stop reason: SURG

## 2018-12-27 RX ORDER — AMLODIPINE BESYLATE 5 MG/1
5 TABLET ORAL DAILY
Status: DISCONTINUED | OUTPATIENT
Start: 2018-12-27 | End: 2018-12-29

## 2018-12-27 RX ORDER — ONDANSETRON 2 MG/ML
INJECTION INTRAMUSCULAR; INTRAVENOUS AS NEEDED
Status: DISCONTINUED | OUTPATIENT
Start: 2018-12-27 | End: 2018-12-27 | Stop reason: SURG

## 2018-12-27 RX ORDER — PANTOPRAZOLE SODIUM 40 MG/1
40 TABLET, DELAYED RELEASE ORAL
Status: DISCONTINUED | OUTPATIENT
Start: 2018-12-28 | End: 2018-12-31 | Stop reason: HOSPADM

## 2018-12-27 RX ORDER — SODIUM CHLORIDE 9 MG/ML
125 INJECTION, SOLUTION INTRAVENOUS CONTINUOUS
Status: DISCONTINUED | OUTPATIENT
Start: 2018-12-27 | End: 2018-12-27

## 2018-12-27 RX ORDER — LOSARTAN POTASSIUM 50 MG/1
100 TABLET ORAL DAILY
Status: DISCONTINUED | OUTPATIENT
Start: 2018-12-27 | End: 2018-12-31 | Stop reason: HOSPADM

## 2018-12-27 RX ORDER — EPHEDRINE SULFATE 50 MG/ML
INJECTION, SOLUTION INTRAVENOUS AS NEEDED
Status: DISCONTINUED | OUTPATIENT
Start: 2018-12-27 | End: 2018-12-27 | Stop reason: SURG

## 2018-12-27 RX ORDER — SODIUM CHLORIDE FOR INHALATION 0.9 %
VIAL, NEBULIZER (ML) INHALATION
Status: COMPLETED
Start: 2018-12-27 | End: 2018-12-27

## 2018-12-27 RX ORDER — PROPOFOL 10 MG/ML
INJECTION, EMULSION INTRAVENOUS AS NEEDED
Status: DISCONTINUED | OUTPATIENT
Start: 2018-12-27 | End: 2018-12-27 | Stop reason: SURG

## 2018-12-27 RX ORDER — CLINDAMYCIN PHOSPHATE 900 MG/50ML
900 INJECTION INTRAVENOUS EVERY 8 HOURS
Status: COMPLETED | OUTPATIENT
Start: 2018-12-27 | End: 2018-12-28

## 2018-12-27 RX ORDER — ACETAMINOPHEN 650 MG/1
650 SUPPOSITORY RECTAL EVERY 4 HOURS PRN
Status: DISCONTINUED | OUTPATIENT
Start: 2018-12-27 | End: 2018-12-28

## 2018-12-27 RX ORDER — HYDROMORPHONE HCL/PF 1 MG/ML
0.5 SYRINGE (ML) INJECTION
Status: DISCONTINUED | OUTPATIENT
Start: 2018-12-27 | End: 2018-12-28

## 2018-12-27 RX ORDER — DEXAMETHASONE SODIUM PHOSPHATE 4 MG/ML
INJECTION, SOLUTION INTRA-ARTICULAR; INTRALESIONAL; INTRAMUSCULAR; INTRAVENOUS; SOFT TISSUE AS NEEDED
Status: DISCONTINUED | OUTPATIENT
Start: 2018-12-27 | End: 2018-12-27 | Stop reason: SURG

## 2018-12-27 RX ORDER — ALBUTEROL SULFATE 2.5 MG/3ML
2.5 SOLUTION RESPIRATORY (INHALATION) EVERY 4 HOURS PRN
Status: DISCONTINUED | OUTPATIENT
Start: 2018-12-27 | End: 2018-12-31 | Stop reason: HOSPADM

## 2018-12-27 RX ORDER — HYDROMORPHONE HCL/PF 1 MG/ML
1 SYRINGE (ML) INJECTION ONCE
Status: COMPLETED | OUTPATIENT
Start: 2018-12-27 | End: 2018-12-27

## 2018-12-27 RX ORDER — CLINDAMYCIN PHOSPHATE 900 MG/50ML
900 INJECTION INTRAVENOUS ONCE
Status: COMPLETED | OUTPATIENT
Start: 2018-12-27 | End: 2018-12-27

## 2018-12-27 RX ORDER — FENTANYL CITRATE 50 UG/ML
50 INJECTION, SOLUTION INTRAMUSCULAR; INTRAVENOUS
Status: DISCONTINUED | OUTPATIENT
Start: 2018-12-27 | End: 2018-12-28

## 2018-12-27 RX ORDER — FENTANYL CITRATE 50 UG/ML
INJECTION, SOLUTION INTRAMUSCULAR; INTRAVENOUS AS NEEDED
Status: DISCONTINUED | OUTPATIENT
Start: 2018-12-27 | End: 2018-12-27 | Stop reason: SURG

## 2018-12-27 RX ORDER — GLYCOPYRROLATE 0.2 MG/ML
0.4 INJECTION INTRAMUSCULAR; INTRAVENOUS ONCE
Status: COMPLETED | OUTPATIENT
Start: 2018-12-27 | End: 2018-12-27

## 2018-12-27 RX ORDER — FUROSEMIDE 20 MG/1
20 TABLET ORAL DAILY
Status: DISCONTINUED | OUTPATIENT
Start: 2018-12-27 | End: 2018-12-28

## 2018-12-27 RX ORDER — BUDESONIDE AND FORMOTEROL FUMARATE DIHYDRATE 160; 4.5 UG/1; UG/1
2 AEROSOL RESPIRATORY (INHALATION) 2 TIMES DAILY
Status: DISCONTINUED | OUTPATIENT
Start: 2018-12-27 | End: 2018-12-31 | Stop reason: HOSPADM

## 2018-12-27 RX ORDER — HEPARIN SODIUM 1000 [USP'U]/ML
INJECTION, SOLUTION INTRAVENOUS; SUBCUTANEOUS AS NEEDED
Status: DISCONTINUED | OUTPATIENT
Start: 2018-12-27 | End: 2018-12-27 | Stop reason: SURG

## 2018-12-27 RX ADMIN — FENTANYL CITRATE 50 MCG: 50 INJECTION, SOLUTION INTRAMUSCULAR; INTRAVENOUS at 15:52

## 2018-12-27 RX ADMIN — PROPOFOL 100 MG: 10 INJECTION, EMULSION INTRAVENOUS at 08:09

## 2018-12-27 RX ADMIN — MIDAZOLAM 2 MG: 1 INJECTION INTRAMUSCULAR; INTRAVENOUS at 08:00

## 2018-12-27 RX ADMIN — SODIUM CHLORIDE, SODIUM LACTATE, POTASSIUM CHLORIDE, AND CALCIUM CHLORIDE: .6; .31; .03; .02 INJECTION, SOLUTION INTRAVENOUS at 07:50

## 2018-12-27 RX ADMIN — SODIUM CHLORIDE: 0.9 INJECTION, SOLUTION INTRAVENOUS at 08:47

## 2018-12-27 RX ADMIN — INSULIN LISPRO 1 UNITS: 100 INJECTION, SOLUTION INTRAVENOUS; SUBCUTANEOUS at 17:23

## 2018-12-27 RX ADMIN — NEOSTIGMINE METHYLSULFATE 2.19 MG: 1 INJECTION, SOLUTION INTRAVENOUS at 12:59

## 2018-12-27 RX ADMIN — CHLORHEXIDINE GLUCONATE 0.12% ORAL RINSE 15 ML: 1.2 LIQUID ORAL at 21:48

## 2018-12-27 RX ADMIN — HYDROMORPHONE HYDROCHLORIDE 1 MG: 1 INJECTION, SOLUTION INTRAMUSCULAR; INTRAVENOUS; SUBCUTANEOUS at 12:06

## 2018-12-27 RX ADMIN — SODIUM CHLORIDE 20 ML/HR: 0.45 INJECTION, SOLUTION INTRAVENOUS at 12:03

## 2018-12-27 RX ADMIN — GLYCOPYRROLATE 0.4 MG: 0.2 INJECTION INTRAMUSCULAR; INTRAVENOUS at 12:59

## 2018-12-27 RX ADMIN — SODIUM CHLORIDE 1 MG/HR: 0.9 INJECTION, SOLUTION INTRAVENOUS at 13:45

## 2018-12-27 RX ADMIN — IPRATROPIUM BROMIDE 0.5 MG: 0.5 SOLUTION RESPIRATORY (INHALATION) at 13:14

## 2018-12-27 RX ADMIN — CLINDAMYCIN PHOSPHATE 900 MG: 900 INJECTION, SOLUTION INTRAVENOUS at 17:21

## 2018-12-27 RX ADMIN — METOPROLOL TARTRATE 50 MG: 50 TABLET, FILM COATED ORAL at 21:48

## 2018-12-27 RX ADMIN — SODIUM CHLORIDE 5 MG/HR: 0.9 INJECTION, SOLUTION INTRAVENOUS at 21:48

## 2018-12-27 RX ADMIN — MUPIROCIN 1 APPLICATION: 20 OINTMENT TOPICAL at 06:50

## 2018-12-27 RX ADMIN — ROCURONIUM BROMIDE 10 MG: 10 INJECTION INTRAVENOUS at 10:30

## 2018-12-27 RX ADMIN — ONDANSETRON 4 MG: 2 INJECTION INTRAMUSCULAR; INTRAVENOUS at 11:40

## 2018-12-27 RX ADMIN — ATORVASTATIN CALCIUM 10 MG: 10 TABLET, FILM COATED ORAL at 17:17

## 2018-12-27 RX ADMIN — METOPROLOL TARTRATE 12.5 MG: 25 TABLET ORAL at 06:45

## 2018-12-27 RX ADMIN — LEVALBUTEROL HYDROCHLORIDE 1.25 MG: 1.25 SOLUTION, CONCENTRATE RESPIRATORY (INHALATION) at 13:14

## 2018-12-27 RX ADMIN — HEPARIN SODIUM 8000 UNITS: 1000 INJECTION INTRAVENOUS; SUBCUTANEOUS at 09:26

## 2018-12-27 RX ADMIN — IPRATROPIUM BROMIDE AND ALBUTEROL SULFATE 3 ML: .5; 3 SOLUTION RESPIRATORY (INHALATION) at 07:28

## 2018-12-27 RX ADMIN — ROCURONIUM BROMIDE 50 MG: 10 INJECTION INTRAVENOUS at 08:09

## 2018-12-27 RX ADMIN — ISODIUM CHLORIDE 3 ML: 0.03 SOLUTION RESPIRATORY (INHALATION) at 07:28

## 2018-12-27 RX ADMIN — INSULIN LISPRO 1 UNITS: 100 INJECTION, SOLUTION INTRAVENOUS; SUBCUTANEOUS at 22:45

## 2018-12-27 RX ADMIN — CHLORHEXIDINE GLUCONATE 0.12% ORAL RINSE 15 ML: 1.2 LIQUID ORAL at 06:50

## 2018-12-27 RX ADMIN — MUPIROCIN 1 APPLICATION: 20 OINTMENT TOPICAL at 21:48

## 2018-12-27 RX ADMIN — CLINDAMYCIN PHOSPHATE 900 MG: 18 INJECTION, SOLUTION INTRAMUSCULAR; INTRAVENOUS at 08:05

## 2018-12-27 RX ADMIN — ROCURONIUM BROMIDE 20 MG: 10 INJECTION INTRAVENOUS at 09:05

## 2018-12-27 RX ADMIN — IPRATROPIUM BROMIDE 0.5 MG: 0.5 SOLUTION RESPIRATORY (INHALATION) at 19:01

## 2018-12-27 RX ADMIN — EPHEDRINE SULFATE 5 MG: 50 INJECTION, SOLUTION INTRAMUSCULAR; INTRAVENOUS; SUBCUTANEOUS at 08:16

## 2018-12-27 RX ADMIN — LEVALBUTEROL HYDROCHLORIDE 1.25 MG: 1.25 SOLUTION, CONCENTRATE RESPIRATORY (INHALATION) at 19:01

## 2018-12-27 RX ADMIN — FENTANYL CITRATE 50 MCG: 50 INJECTION, SOLUTION INTRAMUSCULAR; INTRAVENOUS at 08:09

## 2018-12-27 RX ADMIN — ROCURONIUM BROMIDE 20 MG: 10 INJECTION INTRAVENOUS at 10:50

## 2018-12-27 RX ADMIN — HEPARIN SODIUM 5000 UNITS: 1000 INJECTION INTRAVENOUS; SUBCUTANEOUS at 10:12

## 2018-12-27 RX ADMIN — DEXAMETHASONE SODIUM PHOSPHATE 4 MG: 4 INJECTION, SOLUTION INTRAMUSCULAR; INTRAVENOUS at 08:20

## 2018-12-27 RX ADMIN — HYDROMORPHONE HYDROCHLORIDE 1 MG: 1 INJECTION, SOLUTION INTRAMUSCULAR; INTRAVENOUS; SUBCUTANEOUS at 13:02

## 2018-12-27 RX ADMIN — LIDOCAINE HYDROCHLORIDE 50 MG: 10 INJECTION, SOLUTION INFILTRATION; PERINEURAL at 08:09

## 2018-12-27 RX ADMIN — PHENYLEPHRINE HYDROCHLORIDE 50 MCG/MIN: 10 INJECTION INTRAVENOUS at 08:44

## 2018-12-27 RX ADMIN — FENTANYL CITRATE 50 MCG: 50 INJECTION, SOLUTION INTRAMUSCULAR; INTRAVENOUS at 09:07

## 2018-12-27 NOTE — ANESTHESIA PREPROCEDURE EVALUATION
83 y/o M for TEVAR  Severe COPD, home O2 dependent  P/W wheezing and rhonchi, but this is baseline per family and chart  No recent history of illness/resp infx  Review of Systems/Medical History  Patient summary reviewed  Chart reviewed      Cardiovascular  EKG reviewed, Exercise tolerance (METS): <4,  Hypertension , CAD ,    Pulmonary  COPD severe- O2 dependent , Asthma , poorly controlled ,        GI/Hepatic    GERD ,        Kidney stones,        Endo/Other     GYN       Hematology   Musculoskeletal       Neurology   Psychology   Negative psychology ROS              Physical Exam    Airway    Mallampati score: I  TM Distance: >3 FB  Neck ROM: full     Dental   Comment: edentulous,     Cardiovascular  Rhythm: regular, Rate: normal,     Pulmonary  Rhonchi, Wheezes,     Other Findings        Anesthesia Plan  ASA Score- 4     Anesthesia Type- general with ASA Monitors  Additional Monitors: arterial line and central venous line  Airway Plan: ETT  Plan Factors- Patient instructed to abstain from smoking on day of procedure  Patient did not smoke on day of surgery  Induction- intravenous  Postoperative Plan-     Informed Consent- Anesthetic plan and risks discussed with patient  I personally reviewed this patient with the CRNA  Discussed and agreed on the Anesthesia Plan with the CRNA  Argenis Macedo

## 2018-12-27 NOTE — ANESTHESIA POSTPROCEDURE EVALUATION
Post-Op Assessment Note      CV Status:  Stable    Mental Status:  Unresponsive    Hydration Status:  Euvolemic    PONV Controlled:  Controlled    Airway Patency:  Patent  Airway: intubated    Post Op Vitals Reviewed: Yes          Staff: CRNA, Anesthesiologist           BP  141/62   Temp      Pulse  69   Resp   14   SpO2 98 % (12/27/18 1137)

## 2018-12-27 NOTE — OP NOTE
OPERATIVE REPORT  PATIENT NAME: Eliza Daly    :  [id number]  MRN: [id number]  Pt Location: [facility] OR ROOM 02    SURGERY DATE: 2018    SURGEON: Evangelina Caal MD     ASSISTANT: n/a    CO-SURGEON: Dr Kailey Gray Doctor     INTERVENTIONAL RADIOLOGIST: Dr Salina Fournier MD     PREOPERATIVE DIAGNOSIS: Descending thoracic aortic aneurysm  POSTOPERATIVE DIAGNOSIS: Descending thoracic aortic aneurysm  NYHA CLASS: 2    CCS CLASS: 1    PROCEDURE: Thoracic endovascular aneurysm repair with a 200x40 [place] endovascular graft  ANESTHESIA: General endotracheal anesthesia, Dr Baltazar Cornelius     INDICATIONS:  The patient is a 82y o  year-old male with a 5 5 cms descending thoracic aorta aneurysm that grew 5 mm over the last 6 months  The patient was evaluated in our aortic clinic, I recommended the procedure described previously  FINDINGS:  1  Initial aortography was performed in an Persian projection identifying the aneurysm, site of the left subclavian takeoff, and the origin of the celiac artery  2  Final aortography showed subclavian artery and celiac artery were patent and there was no evidence of an endoleak  OPERATIVE TECHNIQUE:    The patient was taken to the operating room and placed supine on the operating table  Following the satisfactory induction of general anesthesia and placement of monitoring lines, the patient was prepped and draped in the usual sterile fashion  A time-out procedure was performed  Anesthesia place a spinal drain  The right common femoral artery was accessed with Seldinger's technique under ultrasound guidance with a micropuncture kit and exchanged for Bentson wires  Two (2) Perclose sutures were deployed in the standard fashion   An 8 Georgian sheath was placed, an exchange catheter was advanced through the sheath into the ascending aorta under radiographic guidance, the Bentson wire was exchanged for a super stiff Lunderquist wire, which was advanced into the ascending aorta   The left common femoral artery was accessed with Seldinger's technique under ultrasound guidance with a micropuncture kit and exchanged for Bentson wires  A pigtail catheter was advanced and positioned in the aortic arch, an aortography was performed in an ADORE projection identifying the site of the left subclavian takeoff and then in a lateral position identifying the origin of the celiac artery  The patient was systemically heparinized  The right common femoral artery sheath was removed and the artery was serially dilated under fluoroscopic guidance and the delivery sheath was then introduced  A 40x200 Baker graft was selected and prepared, and advanced through the sheath into the appropriate position using fluoroscopy  When appropriate positioning was confirmed, the endograft was deployed in standard fashion  A trilobed balloon was then inserted through the sheath and balloon angioplasty was performed at both the proximal and distal landing zones  The pigtail was repositioned into the aortic arch and aortography was again performed  The subclavian artery and celiac artery were patent and there was no evidence of an endoleak  The right common femoral artery sheath was then removed while the wire was maintained and previously placed Perclose devices were secured  The pigtail was removed from the left common femoral artery and hemostasis was obtained by percutaneous closure device  Hemostasis was confirmed  Protamine was then administered to reverse heparinization  Distal pulses were evaluated and found to be intact  The patient tolerated the procedure well and was transferred in stable condition  COMPLICATIONS: None  PACKS/TUBES/DRAINS: None  EBL: 50 cc  TRANSFUSION: None  SPECIMENS: None       NOTE: The complex nature of this procedure required the participation of a cardiac surgeon for expertise with thoracic aortic disease and a vascular surgeon and interventional radiologist for expertise with endovascular intervention, as well as radiographic interpretation         SIGNATURE: Pneny Preciado MD  DATE: December 27, 2018  TIME: 12:46 PM

## 2018-12-27 NOTE — OP NOTE
OPERATIVE REPORT  PATIENT NAME: BANNER VERGARA [facility]    :    MRN: [id number]  Pt Location: [facility] OR ROOM 02    SURGERY DATE: 2018    Surgeon(s) and Role:     Kait Ortega MD - Primary Surgeon     * Tika Lopez MD - Dilip Quiroga MD - Assitant    Preop Diagnosis:  Descending Thoracic aortic aneurysm without rupture ([address] ) [I71 2]    Post-Op Diagnosis Codes:  same    Procedure(s) (LRB):  Endovascular thoracic aortic aneurysm repair  [place]enton TAG endograft:  40 x 200mm    Specimen(s):  none    Estimated Blood Loss:   50 mL    Drains:  Urethral Catheter Latex 16 Fr  (Active)   Site Assessment Clean 2018 12:00 PM   Collection Container Standard drainage bag 2018 12:00 PM   Securement Method Securing device (Describe) 2018 12:00 PM   Output (mL) 45 mL 2018  2:00 PM   Number of days: 0       Lumbar Drain (Active)   Number of days: 0       Anesthesia Type:   General ETA    Operative Indications:  Patient is an 81 yo M w/ known TAA which has grown significantly during surveillance over the past year  Presents for repair  Operative Findings:  Successful placement of [place] endograft with aneurysm exclusion and no evidence of residual endoleak  Left subclavian and celiac both remained patent prior to and after graft deployment    Complications:   None    Procedure and Technique:  After informed consent was obtained, the patient was brought to the operating room and placed in the supine position  He was given anesthesia and endotracheally intubated  He was given IV antibiotics  A spinal drain, central line, and arterial line were placed by anesthesia  He was prepped and draped in the usual sterile fashion, exposing the bilateral groins  A timeout was performed  Fluoroscopy was used to locate the level of the femoral heads  Under direct ultrasound guidance, the common femoral arteries were accessed with an access needle bilaterally    The left femoral bifurcation was noted to be high  Bentson wires were inserted into the aorta and the needles was exchanged for a 6F dilator on the right and a 5F sheath on the left side  Two perclose devices were deployed at the right groin  An angled glide catheter was inserted over the wire through the right side and manipulated into the ascending aorta and the wire was exchanged for a lunderquist wire  The catheter was then removed and the sheath exchanged for a 24F dryseal sheath  8000units of IV heparin were given  This was redosed as necessary throughout the remainder of the case based on ACT  A 40mm x 200mm device was selected and inserted through the right side to the approximate level of the celiac artery distally  A marker pigtail catheter was inserted through the left side and placed in the aortic arch  An aortogram was performed in a 15 degree cranial and 25 Turkmen position  The celiac artery was not visualized and several additional images were taken in a magnified view at different angles to better define our distal endpoint at the celiac artery  As this was still somewhat unclear, a vargas catheter and angled glidewire were used to try to select the celiac artery  Although, this was unsuccessful, the SMA was selected and the catheter left in this place as a distal marker  Using the CT scan, the celiac was measured to be 2cm proximal to the distal aspect of the SMA and the graft was positioned just proximal to this margin as well as using the poorly defined image as a roadmap  The proximal aspect of the graft was significantly distal to the subclavian artery takeoff  A timeout was performed for the critical step  The graft was deployed  The delivery system was then removed  A trilobe balloon was inserted and used to angioplasty the proximal and distal endpoints  The pigtail catheter was reinserted and placed again in the aortic arch  An angiogram was performed    The graft appeared to be in good position with no evidence of endoleak  A second arteriogram was performed with the catheter in the distal portion of the graft and the celiac artery was again poorly visualized but patent  We then turned our attention to the access sites  The dryseal sheath was backed out over the wire and the perclose stitches were tightened  An additional perclose device was required to achieve hemostasis and the wire was then removed  The stitches were then locked and manual pressure was held until hemostasis was achieved  A mynx closure device was used for the 5F access site  The access sites were dressed with histoacryl glue  The patient was kept intubated on transfer to the ICU due to pre-existing respiratory issues  He was transferred to the ICU for postoperative care  Pulse exam at completion was 2+ R DP and 2+ L PT pulses  I was present for the entire procedure  No residents were available or present  A cosurgeon was required due to multiple access sites and to techniques/knowledge specific to our different specialties      Patient Disposition:  Critical Care Unit    SIGNATURE: Ivis Ortega MD  DATE: December 27, 2018  TIME: 3:00 PM    Vascular Quality Initiative - TEVAR Aortic Abdominal    Aortic Access: ???    Current Side Access: Percutaneous femoral; Ultrasound Guidance: yes    Right Iliac Access Procedure: None    Left Iliac Access Procedure: None    Arm/Neck Access: No    Aortic Device Implanted: Yes, Grawn 40 x 200mm    Completion:    Deployment Technical Success: yes    Conversion to Open:  no    Device Access Artery Injury: None    Endoleak at Completion (complete only if pathology = 'aneurysm' or 'aneurysm from dissection'): no     Proximal Zone of Disease :  4    Distal Zone of Disease : 5    Maximum aortic diameter :  65mm    Zone of maximum diameter:  5    Current endoleak? no    Aortic Device Deformation :  none

## 2018-12-27 NOTE — UTILIZATION REVIEW
[address]  Utilization Review Department  Phone: [phone]; Fax [phone]  [email or web address]  ATTENTION: Please call with any questions or concerns to [phone]  and carefully listen to the prompts so that you are directed to the right person  Send all requests for admission clinical reviews, approved or denied determinations and any other requests to fax [phone]  All voicemails are confidential     =========================================================================    Initial Clinical Review    Age/Sex: 82 y o  male    Surgery Date: 12/27/2018    Procedure: Endovascular thoracic aortic aneurysm repair  [place] TAG endograft:  40 x 200mm     Anesthesia: General ETA    Admission Orders: Date/Time/Statement: 12/27/18 @ 0841   Orders Placed This Encounter   Procedures    Inpatient Admission     Standing Status:   Standing     Number of Occurrences:   1     Order Specific Question:   Admitting Physician     Answer:   Leanna Maravilla     Order Specific Question:   Level of Care     Answer:   Critical Care [15]     Order Specific Question:   Estimated length of stay     Answer:   More than 2 Midnights     Order Specific Question:   Certification     Answer:   I certify that inpatient services are medically necessary for this patient for a duration of greater than two midnights  See H&P and MD Progress Notes for additional information about the patient's course of treatment  Vital Signs: BP (!) 190/94   Pulse 70   Temp (!) 97 4 °F (36 3 °C) (Axillary)   Resp 14   Ht 5' 1" (1 549 m)   Wt 73 kg (161 lb)   SpO2 98%   BMI 30 42 kg/m²     Diet:        Diet Orders            Start     Ordered    12/28/18 0000  Diet Clear Liquid  Diet effective 0500     Question Answer Comment   Diet Type Clear Liquid    RD to adjust diet per protocol?  No        12/27/18 1145    12/27/18 1146  Diet NPO; Sips with meds  Diet effective now     Question Answer Comment   Diet Type NPO    NPO Except: Sips with meds    RD to adjust diet per protocol? No        12/27/18 1145      A  Line  Intubated / vented / weaning  Neurovascular checks q1h    Mobility: bed rest q24h then re-eval, POD #1 OOB chair and for all meals    DVT Prophylaxis: Lenny SCDs    Pain Control:   Pain Medications             acetaminophen (TYLENOL) 325 mg tablet Take 1-2 tablets by mouth every 4 (four) hours as needed    aspirin (ECOTRIN LOW STRENGTH) 81 mg EC tablet Take 81 mg by mouth daily        Scheduled Meds:  Current Facility-Administered Medications:  acetaminophen 650 mg Rectal Q4H PRN Alexandra Haile PA-C    acetaminophen 650 mg Oral Q4H PRN Alexandra Haile PA-C    albuterol 2 5 mg Nebulization Q4H PRN Anna Marie Coronado MD    amLODIPine 5 mg Oral Daily Guerline Metz PA-C    atorvastatin 10 mg Oral Daily With Dinner Alexandra Haile PA-C    bisacodyl 10 mg Rectal Daily PRN Alexandra Haile PA-C    budesonide-formoterol 2 puff Inhalation BID Alexandra Haile PA-C    chlorhexidine 15 mL Swish & Spit Q12H [address] Kevin Deshpande PA-C    clindamycin 900 mg Intravenous Q8H Lisa Victoria PA-C    fentanyl citrate (PF) 50 mcg Intravenous Q1H PRN Alexandra Haile PA-C    furosemide 20 mg Oral Daily Guerline Metz PA-C    HYDROmorphone 0 5 mg Intravenous Q1H PRN Alexandra Haile PA-C    HYDROmorphone 1 mg Intravenous Q1H PRN Alexandra Haile PA-C    insulin lispro 1-5 Units Subcutaneous TID AC Lisa Victoria PA-C    insulin lispro 1-5 Units Subcutaneous HS Lisa Victoria PA-C    ipratropium 0 5 mg Nebulization TID Anna Marie Coronado MD    levalbuterol 1 25 mg Nebulization TID Anna Marie Coronado MD    losartan 100 mg Oral Daily Gina TRI Metz PA-C    metoprolol tartrate 50 mg Oral Q12H [address] Alexandra Haile PA-C    mupirocin 1 application Nasal Y72C [address] Alexandra Haile PA-C    niCARdipine 1-15 mg/hr Intravenous Titrated Guerline Metz PA-C Last Rate: 3 mg/hr (12/27/18 1602)   ondansetron 4 mg Intravenous Q6H PRN Abraham Saul PA-C    [START ON 12/28/2018] pantoprazole 40 mg Oral Early Morning Abraham Saul PA-C    polyethylene glycol 17 g Oral Daily Abrhaam Saul PA-C    tiotropium 18 mcg Inhalation Daily Abraham Saul PA-C      Continuous Infusions:  niCARdipine 1-15 mg/hr Last Rate: 3 mg/hr (12/27/18 1602)

## 2018-12-27 NOTE — CONSULTS
[address] [place] [address] Drive 82 y o  male MRN: [id number]  Unit/Bed#: [facility] 413-01 Encounter: [id number]      Physician Requesting Consult: Penny Preciado MD    Reason for Consult / Principal Problem: Thoracic aortic aneurysm without rupture [facility])    HPI: Lily Johnson is a 82 y o  male with thoracoabdominal aortic aneurysm who presents s/p TEVAR  THe patient had an incidentally found thoracoabdominal aortic aneurysm prompting vascular surgery evaluation  Between initial imaging and his evaluation in the vascular surgery office the aneurysm had increased to 54mm, as a result the patient was scheduled for TEVAR today  The patient now presents s/p TEVAR with placement of prophylactic lumbar drain  PMHx: PVD, HPTN, DVT, GERD, COPD, CAD, and thoracoabdominal aortic aneurysm    History obtained from chart review due to patient being intubated and sedated       PMH:   Past Medical History:   Diagnosis Date    Appendicolith     Ascending aortic aneurysm (HCC)     3 7    Asthma     BPH (benign prostatic hyperplasia)     CAD (coronary artery disease)     noted on CT scan    COPD (chronic obstructive pulmonary disease) ([address] )     Descending thoracic aortic aneurysm (HCC)     Diverticulosis     Former tobacco use     GERD (gastroesophageal reflux disease)     History of DVT (deep vein thrombosis)     Left leg    History of transfusion     Hypertension     Inguinal hernia     right    Nephrolithiasis     Oxygen dependent     2LNC    Prostate calculus     PVD (peripheral vascular disease) (HCC)     Ulcer     Varicose vein of leg     b/l       PSH:   Past Surgical History:   Procedure Laterality Date    ESOPHAGOGASTRODUODENOSCOPY      INGUINAL HERNIA REPAIR Bilateral     VARICOSE VEIN SURGERY Bilateral     vein stripping       Family History:   Family History   Problem Relation Age of Onset    Tuberculosis Mother     No Known Problems Father     Cancer Sister     Diabetes Family     Hypertension Family        Social History:   History   Smoking Status    Former Smoker    Packs/day: 0 00    Years: 35 00    Start date: 1966    Quit date: 2001   Smokeless Tobacco    Never Used    History   Alcohol Use No      Marital Status: /Civil Union    ROS: ROS unable to be obtained secondary to intubation and sedation  Allergies: Allergies   Allergen Reactions    Penicillins Hives, Itching and Rash    Lisinopril Rash     Side pains and rash        Home Medications:   Prior to Admission medications    Medication Sig Start Date End Date Taking? Authorizing Provider   acetaminophen (TYLENOL) 325 mg tablet Take 1-2 tablets by mouth every 4 (four) hours as needed 8/8/17  Yes Historical Provider, MD   albuterol (2 5 mg/3 mL) 0 083 % nebulizer solution Take 1 vial (2 5 mg total) by nebulization every 6 (six) hours as needed for wheezing or shortness of breath 6/26/18  Yes Rula Avelar MD   albuterol (VENTOLIN HFA) 90 mcg/act inhaler Inhale 2 puffs 8/8/17  Yes Historical Provider, MD   amLODIPine (NORVASC) 5 mg tablet Take 1 tablet (5 mg total) by mouth daily 11/6/18  Yes Oskar Biggs MD   aspirin (ECOTRIN LOW STRENGTH) 81 mg EC tablet Take 81 mg by mouth daily   Yes Historical Provider, MD   atorvastatin (LIPITOR) 10 mg tablet Take 10 mg by mouth daily   Yes Historical Provider, MD   budesonide-formoterol (SYMBICORT) 160-4 5 mcg/act inhaler Inhale 2 puffs 2 (two) times a day Rinse mouth after use     Yes Historical Provider, MD   furosemide (LASIX) 20 mg tablet Take 1 tablet (20 mg total) by mouth daily 8/2/18  Yes Mily Vance MD   losartan (COZAAR) 100 MG tablet TAKE ONE TABLET BY MOUTH ONCE DAILY 12/3/18  Yes Rula Avelar MD   pirocin [facility]) 2 % nasal ointment into each nostril 2 (two) times a day Start 5 days prior to surgery 9/27/18  Yes Marty Figueroa PA-C   [facility]) 2 % nasal ointment into each nostril 2 (two) times a day 12/6/18  Yes Gwen Jasso Orville Barrientos PA-C   pantoprazole (PROTONIX) 40 mg tablet Take 1 tablet (40 mg total) by mouth daily 8/20/18  Yes [place] Smoker, MD   tiotropium [facility]) 18 mcg inhalation capsule Place 18 mcg into inhaler and inhale 9/18/18 9/18/19 Yes Historical Provider, MD   metoprolol tartrate (LOPRESSOR) 50 mg tablet Take 1 tablet (50 mg total) by mouth every 12 (twelve) hours 12/13/18   [place] Smoker, MD       Inpatient Medications:  Scheduled Meds:  Current Facility-Administered Medications:  acetaminophen 650 mg Rectal Q4H PRN Mena Chanel, PA-C    acetaminophen 650 mg Oral Q4H PRN Mena Joanna, PA-PAZ    albuterol 2 5 mg Nebulization Q4H PRN Franco Oliver MD    amLODIPine 5 mg Oral Daily Mena Chanel, PA-PAZ    atorvastatin 10 mg Oral Daily With Dinner Mena Joanna, PA-C    bisacodyl 10 mg Rectal Daily PRN Mena Chanel, PA-C    budesonide-formoterol 2 puff Inhalation BID Mena Chanel, PA-C    chlorhexidine 15 mL Swish & Spit Q12H [address] Edwin Vitor Barrientos PA-C    clindamycin 900 mg Intravenous Q8H Lisa Victoria PA-C    fentanyl citrate (PF) 50 mcg Intravenous Q1H PRN Mena Chanel, PA-C    fentanyl citrate (PF) 50 mcg Intravenous Once Mena Joanna, PA-PAZ    furosemide 20 mg Oral Daily Mena Chanel, PA-C    HYDROmorphone 0 5 mg Intravenous Q1H PRN Mena Chanel, PA-C    HYDROmorphone 1 mg Intravenous Q1H PRN Mena Chanel, PA-C    insulin lispro 1-5 Units Subcutaneous TID AC RIZWANA Berman-PAZ    insulin lispro 1-5 Units Subcutaneous HS Mena Chanel, PA-C    ipratropium 0 5 mg Nebulization TID Franco Oliver MD    levalbuterol 1 25 mg Nebulization TID Franco Oliver MD    losartan 100 mg Oral Daily Mena Joanna, JEREMIE    metoprolol tartrate 50 mg Oral Q12H [address] Mena Buchanan PA-C    mupirocin 1 application Nasal R66C [address] Edwin Barrientos PA-C    ondansetron 4 mg Intravenous Q6H PRN Mena Buchanan PA-C    [START ON 12/28/2018] pantoprazole 40 mg Oral Early Morning Mena Buchanan PA-C polyethylene glycol 17 g Oral Daily Neymar Santoyo PA-C    sodium chloride 20 mL/hr Intravenous Continuous Neymar Santoyo PA-C Last Rate: 20 mL/hr (18 1203)   tiotropium 18 mcg Inhalation Daily Neymar Santoyo PA-C      Continuous Infusions:  sodium chloride 20 mL/hr Last Rate: 20 mL/hr (18 1203)     PRN Meds:    acetaminophen 650 mg Q4H PRN   acetaminophen 650 mg Q4H PRN   albuterol 2 5 mg Q4H PRN   bisacodyl 10 mg Daily PRN   fentanyl citrate (PF) 50 mcg Q1H PRN   HYDROmorphone 0 5 mg Q1H PRN   HYDROmorphone 1 mg Q1H PRN   ondansetron 4 mg Q6H PRN       VTE Pharmacologic Prophylaxis: Reason for no pharmacologic prophylaxis s/p TEVAR  VTE Mechanical Prophylaxis: sequential compression device    Invasive lines and devices: Invasive Devices     Central Venous Catheter Line            CVC Central Lines 18 less than 1 day          Peripheral Intravenous Line            Peripheral IV 18 Left Arm less than 1 day          Arterial Line            Arterial Line 18 Right Radial less than 1 day          Drain            Lumbar Drain less than 1 day    Urethral Catheter Latex 16 Fr  less than 1 day                Vitals:   Vitals:    18 0624 18 0625 18 0644 18 1137   BP: (!) 190/94      Pulse: 80      Resp: 20      Temp: 99 3 °F (37 4 °C)      TempSrc: Tympanic Core      SpO2:  91% 93% 98%   Weight:   73 kg (161 lb)    Height: 5' 1" (1 549 m)                Temperature: Temp (24hrs), Av 3 °F (37 4 °C), Min:99 3 °F (37 4 °C), Max:99 3 °F (37 4 °C)  Current: Temperature: 99 3 °F (37 4 °C)    Weights: IBW: 52 3 kg  Body mass index is 30 42 kg/m²  Hemodynamic Monitoring:  N/A    Ventilator Settings:   Vent Mode: AC/VC  Resp Rate (BPM): 14 BPM  Vt (mL): 400 mL  FIO2 (%): 50 %  PEEP (cmH2O): 5 cmH2O  SpO2: 98 %    Physical Exam:    Constitutional: WNWD, NAD  HENT: PERRLA Intubated  Neck: No JVD  +CVC  Cardiovascular: regular rate and regular rhythm   no murmur heard  Exam reveals no rub  Pulmonary/Chest: Breath sounds clear bilaterally  Abdominal: soft  no distension  Non-tender  Musculoskeletal: Trace edema  Neurological: Unable to assess due to sedation  Skin: Skin is warm, dry  Psychiatric: Unable to assess due to sedation  Labs/Imaging:     Results from last 7 days  Lab Units 18  1158   I STAT HEMOGLOBIN g/dl 14 6   HEMATOCRIT, ISTAT % 43       Results from last 7 days  Lab Units 18  1158   CO2, I-STAT mmol/L 36*   GLUCOSE, ISTAT mg/dl 136           Results from last 7 days  Lab Units 18  1143   HEMOGLOBIN A1C % 7 1*       Post-op AB 45/49/100/9/34/98%  Post-op CXR: ETT @ level of the nano, B/L opacities  I have personally reviewed pertinent films in PACS  Post-op EKG: NSR This was personally reviewed by myself  Impression:  Principal Problem:    Thoracic aortic aneurysm without rupture ([facility] [address] )  Active Problems:    Aneurysm, aorta, thoracic ([facility] )      Plan:    Neuro: d/c continuous sedation  PRN dilaudid and percocet for pain  Trend neuro exam  Regulate sleep/wake cycle  Delirium precautions    -Lumbar drain in place, will obtain full neuro exam after extubation    CV: MAP goal >65  SBP goal <130  CI>2 2  Post-op medications: None  Volume resuscitation as needed  Monitor rhythm on telemetry    -Hold ASA/SQH while lumbar drain in place    Lung: Check STAT post-op ABG and CXR  Wean vent with spontaneous breathing trial with goal to extubate today  GI: GI prophylaxis with PPI  Bowel regimen  Zofran PRN for nausea  FEN: NPO  Replete K >4 0, Mag >2 0 and calcium >7 0  : Check STAT post-op BMP  Hardin in place  Monitor UOP with goal >0 5cc/kg/hour  Lasix prn  Volume resuscitate as needed  ID: Prophylactic post-op abx  Maintain normothermia  Tylenol PRN for fevers  Heme: Check STAT post-op H/H and platelets  Monitor incision site, invasive lines, and chest tube outputs for bleeding   Send coag panel if needed  -s/p TEVAR, groin sites c/d/i    Endo: Insulin gtt for blood sugar <180      Disposition: ICU Care      SIGNATURE: Jenn Sarah PA-C  DATE: December 27, 2018  TIME: 12:12 PM

## 2018-12-27 NOTE — ANESTHESIA PROCEDURE NOTES
Central Line Insertion  Performed by: Ghassan Levi  Authorized by: Ghassan Levi   Date/Time: 12/27/2018 8:45 AM  Consent: Verbal consent obtained  Written consent obtained  Consent given by: patient  Patient understanding: patient states understanding of the procedure being performed  Patient consent: the patient's understanding of the procedure matches consent given  Procedure consent: procedure consent matches procedure scheduled  Relevant documents: relevant documents present and verified  Test results: test results available and properly labeled  Site marked: the operative site was marked  Required items: required blood products, implants, devices, and special equipment available  Patient identity confirmed: arm band and anonymous protocol, patient vented/unresponsive  Time out: Immediately prior to procedure a "time out" was called to verify the correct patient, procedure, equipment, support staff and site/side marked as required  Indications: vascular access  Location details: right internal jugular  Catheter size: 7 Fr  Patient position: Trendelenburg  Assessment: blood return through all ports and free fluid flow  Preparation: skin prepped with 2% chlorhexidine  Skin prep agent dried: skin prep agent completely dried prior to procedure  Sterile barriers: all five maximum sterile barriers used - cap, mask, sterile gown, sterile gloves, and large sterile sheet  Hand hygiene: hand hygiene performed prior to central venous catheter insertion  Ultrasound guidance: yes  sterile gel and probe cover used in ultrasound-guided central venous catheter insertionReason All Sterile Barriers Not Used:   All elements of maximal sterile barrier technique not followed for medical reasons  Pre-procedure: landmarks identified  Vessel of Catheter Tip End: svc  Number of attempts: 1  Successful placement: yes  Post-procedure: chlorhexidine patch applied,  dressing applied and line sutured  Patient tolerance: Patient tolerated the procedure well with no immediate complications

## 2018-12-27 NOTE — RESPIRATORY THERAPY NOTE
RT Ventilator Management Note  Liam Abdi 82 y o  male MRN: [id number]  Unit/Bed#: [facility] 413-01 Encounter: [id number]      Daily Screen       12/27/2018 1137             Patient safety screen outcome[de-identified] -            Physical Exam:   Assessment Type: (P) Assess only  General Appearance: (P) Sedated  Respiratory Pattern: (P) Assisted  Chest Assessment: (P) Chest expansion symmetrical  Bilateral Breath Sounds: (P) Diminished  O2 Device: (P) vent  Subjective Data: (P) intubated/sedated      Resp Comments: (P) Pt back from OR intubated and sedated  Placed on [facility] settings  Pt has hx of copd and takes spiriva and albuterol at home  He also wears 2L NC at baseline  TID and prn udns ordered

## 2018-12-27 NOTE — ANESTHESIA PROCEDURE NOTES
Arterial Line Insertion  Date/Time: 12/27/2018 8:11 AM  Performed by: Rei Cook  Authorized by: Kelsey Dean   Consent: Verbal consent obtained  Written consent obtained  Consent given by: patient and spouse  Patient understanding: patient states understanding of the procedure being performed  Patient consent: the patient's understanding of the procedure matches consent given  Procedure consent: procedure consent matches procedure scheduled  Patient identity confirmed: arm band and hospital-assigned identification number  Preparation: Patient was prepped and draped in the usual sterile fashion  Indications: hemodynamic monitoring  Orientation:  Right  Location: radial artery  Sedation:  Patient sedated: GETA      Procedure Details:  Gus's test normal: yes  Needle gauge: 20  Seldinger technique: Seldinger technique used  Number of attempts: 1    Post-procedure:  Post-procedure: dressing applied  Waveform: good waveform and waveform confirmed  Patient tolerance: Patient tolerated the procedure well with no immediate complications

## 2018-12-27 NOTE — H&P (VIEW-ONLY)
Follow up preop visit - Cardiothoracic Surgery   Claudette Sa 82 y o  male MRN: [id number]    Physician Requesting Consult: No att  providers found    Reason for Consult / Principal Problem: Aortic aneurysm    History of Present Illness: Claudette Sa is a 82y o  year old male who has known descending thoracoabdominal aortic aneurysm who presents for H&P update prior to TEVAR  Patient was last seen by our office in [place], 2018  Since that time he has been evaluated by Dr Ortega in October & surgery has been scheduled for 12/27/18  Since that time he has achieved cardiac clearance from Dr Martinez Beam  Changes to his PMH include an ED visit on 12/2/18 due to incidental HTN (SBP 200s) when taking home BP  In ED was evaluated w/ CTA CAP showing stable aneurysm  Bp came down on own & patient was discharged to home  States cardiac symptomatology is stable since his last visit  Past Medical History:  Past Medical History:   Diagnosis Date    Aneurysm, aorta, thoracic ([address] )     AND ABDOMINAL; KT 8/19/17 - FOLLOWS WITH DR MURILLO  PER OP REC FROM JULY 2017  NEEDS REPEAT CT AND ULTRASOUND IN 6 MONTHS   INTERVENTION IF SIZE > 5-51/2CM    Asthma     COPD (chronic obstructive pulmonary disease) ([address] )     Former tobacco use     GERD (gastroesophageal reflux disease)     History of DVT (deep vein thrombosis)     Hypertension     Oxygen dependent     2LNC    PVD (peripheral vascular disease) (HCC)     Ulcer     Varicose vein of leg      Past Surgical History:   Past Surgical History:   Procedure Laterality Date    INGUINAL HERNIA REPAIR Bilateral     VARICOSE VEIN SURGERY       Family History:  Family History   Problem Relation Age of Onset    No Known Problems Mother     No Known Problems Father     Cancer Sister     Diabetes Family     Hypertension Family      Social History:  History   Alcohol Use No     History   Drug Use No     History   Smoking Status    Former Smoker    Packs/day: 0 00    Years: 35 00    Start date: 1    Quit date: 2001   Smokeless Tobacco    Never Used     Comment: TOBACCO USE  As per [employer]: Current some day smoker       Home Medications:   Prior to Admission medications    Medication Sig Start Date End Date Taking? Authorizing Provider   acetaminophen (TYLENOL) 325 mg tablet Take 1-2 tablets by mouth every 4 (four) hours as needed 8/8/17  Yes Historical Provider, MD   albuterol (2 5 mg/3 mL) 0 083 % nebulizer solution Take 1 vial (2 5 mg total) by nebulization every 6 (six) hours as needed for wheezing or shortness of breath 6/26/18  Yes Layton Aldana MD   albuterol (VENTOLIN HFA) 90 mcg/act inhaler Inhale 2 puffs 8/8/17  Yes Historical Provider, MD   atorvastatin (LIPITOR) 10 mg tablet Take 10 mg by mouth daily   Yes Historical Provider, MD   budesonide-formoterol (SYMBICORT) 160-4 5 mcg/act inhaler Inhale 2 puffs 2 (two) times a day Rinse mouth after use     Yes Historical Provider, MD   losartan (COZAAR) 100 MG tablet TAKE ONE TABLET BY MOUTH ONCE DAILY 12/3/18  Yes Layton Aldana MD   metoprolol tartrate (LOPRESSOR) 50 mg tablet Take 1 tablet (50 mg total) by mouth every 12 (twelve) hours  Patient taking differently: Take 75 mg by mouth every 12 (twelve) hours   10/11/18  Yes Layton Aldana MD   mupirocin [facility]) 2 % nasal ointment into each nostril 2 (two) times a day Start 5 days prior to surgery 9/27/18  Yes Melonie Everett PA-C   pantoprazole (PROTONIX) 40 mg tablet Take 1 tablet (40 mg total) by mouth daily 8/20/18  Yes Layton Aldana MD   tiotropium [facility]) 18 mcg inhalation capsule Place 18 mcg into inhaler and inhale 9/18/18 9/18/19 Yes Historical Provider, MD   amLODIPine (NORVASC) 5 mg tablet Take 1 tablet (5 mg total) by mouth daily  Patient not taking: Reported on 12/2/2018 11/6/18   Gilberto Preciado MD   aspirin (ECOTRIN LOW STRENGTH) 81 mg EC tablet Take 81 mg by mouth daily    Historical Provider, MD   furosemide (LASIX) 20 mg tablet Take 1 tablet (20 mg total) by mouth daily  Patient not taking: Reported on 12/2/2018 8/2/18   Berenice Perera MD       Allergies: Allergies   Allergen Reactions    Lisinopril     Penicillins Hives, Itching and Rash       Review of Systems:  Review of Systems - History obtained from spouse and child and the patient  General ROS: negative  Respiratory ROS: negative  Cardiovascular ROS: negative  Gastrointestinal ROS: negative  Genito-Urinary ROS: negative  Musculoskeletal ROS: negative  Neurological ROS: negative  Dermatological ROS: negative  All other ROS negative    Vital Signs:     Vitals:    12/06/18 0900 12/06/18 0937   BP: 152/82 132/82   BP Location: Left arm Right arm   Cuff Size: Adult    Pulse: 68    Resp: 14    Temp: 97 7 °F (36 5 °C)    TempSrc: Oral    SpO2: (!) 83%    Weight: 74 4 kg (164 lb)    Height: 5' 1" (1 549 m)        Physical Exam:    General:    HEENT/NECK:  PERRLA  No jugular venous distention  Cardiac:RRR  No murmurs/rubs/gallops  No heaves/lifts on palpation     Pulmonary:  Good inspiration, equal expansion b/l, CTA b/l, no wheezes/rales/rhonchi  Abdomen:  Non-tender, Non-distended  Positive bowel sounds  Upper extremities: 2+ radial pulses; brisk capillary refill  Lower extremities: Extremities warm/dry  PT/DP pulses 2+ bilaterally  No edema B/L  Neuro: Alert and oriented X 3  Sensation is grossly intact  No focal deficits  Skin: Warm/Dry, without rashes or lesions      Lab Results:       Results from last 7 days  Lab Units 12/02/18  0245   WBC Thousand/uL 9 00   HEMOGLOBIN g/dL 15 8   HEMATOCRIT % 50 3   PLATELETS Thousands/uL 192       Results from last 7 days  Lab Units 12/02/18  0245   POTASSIUM mmol/L 4 5   CHLORIDE mmol/L 98   CO2 mmol/L 36*   BUN mg/dL 22   CREATININE mg/dL 0 97   CALCIUM mg/dL 9 3         No results found for: HGBA1C  Lab Results   Component Value Date    CKTOTAL 105 02/26/2014    TROPONINI <0 01 12/02/2018       Imaging Studies:     CTA CAP 12/2: aneurysm DTA, prostate mildly prominent/slightly heterogenous/multiple calcifications within, bladder mildly thickened, colonic diverticulosis, appendicolith w/i distal tip of appendix, small to mod right inguinal hernia w/ some mesenteric fat & bowel loops, small left renal lesion, b/l nephrolithiasis, COPD w/ emphysema, atherosclerosis, CAD    I have personally reviewed pertinent films in PACS    Assessment:  Patient Active Problem List    Diagnosis Date Noted    [facility] without rupture [facility] ) 11/19/2018    Edema of both legs 08/02/2018    Snoring 08/02/2018    Varicose veins of both lower extremities with pain 05/23/2018    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[facility] 05/23/2018    Chronic respiratory failure with hypoxia ([facility] ) 04/02/2018    Hypertension 02/01/2018    COPD (chronic obstructive pulmonary disease) ([facility] ) 02/01/2018    Descending aortic aneurysm ([facility] ) 02/01/2018    History of DVT (deep vein thrombosis) 02/01/2018    Benign essential hypertension 08/02/2017    Thoracic aortic aneurysm ([facility] ) 08/02/2017     Descending aortic aneurysm; Ongoing TEVAR workup    Plan:  Risks and benefits of thoracic endovascular aortic repair were discussed in detail today with the patient  We have reviewed results all preoperative radiographic,  laboratory and vascular studies  They understand and wish to proceed with surgical intervention 12/27/18 with EVELYN Aldrich was comfortable with our recommendations, and their questions were answered to their satisfaction  Thank you for allowing us to participate in the care of this patient       Landen Bennett PA-C  DATE: December 6, 2018  TIME: 10:19 AM

## 2018-12-27 NOTE — RESPIRATORY THERAPY NOTE
RT Protocol Note  Eliza Daly 82 y o  male MRN: [id number]  Unit/Bed#: [facility] 413-01 Encounter: [id number]    Assessment    Principal Problem:    Thoracic aortic aneurysm without rupture ([facility] )  Active Problems:    Aneurysm, aorta, thoracic ([facility] )      Home Pulmonary Medications:  Spiriva and Albuterol, home O2 2LPM NC  Home Devices/Therapy: (P) Home O2    Past Medical History:   Diagnosis Date    Appendicolith     Ascending aortic aneurysm ([facility])     3 7    Asthma     BPH (benign prostatic hyperplasia)     CAD (coronary artery disease)     noted on CT scan    COPD (chronic obstructive pulmonary disease) ([facility])     Descending thoracic aortic aneurysm ([facility] )     Diverticulosis     Former tobacco use     GERD (gastroesophageal reflux disease)     History of DVT (deep vein thrombosis)     Left leg    History of transfusion     Hypertension     Inguinal hernia     right    Nephrolithiasis     Oxygen dependent     2LNC    Prostate calculus     PVD (peripheral vascular disease) ([facility])     Ulcer     Varicose vein of leg     b/l     Social History     Social History    Marital status: /Civil Union     Spouse name: N/A    Number of children: N/A    Years of education: N/A     Social History Main Topics    Smoking status: Former Smoker     Packs/day: 0 00     Years: 35 00     Start date: 1966     Quit date: 2001    Smokeless tobacco: Never Used    Alcohol use No    Drug use: No    Sexual activity: No     Other Topics Concern    None     Social History Narrative    ** Merged History Encounter **            Subjective    Subjective Data: (P) intubated/sedated    Objective    Physical Exam:   Assessment Type: (P) Assess only  General Appearance: (P) Sedated  Respiratory Pattern: (P) Assisted  Chest Assessment: (P) Chest expansion symmetrical  Bilateral Breath Sounds: (P) Diminished  O2 Device: (P) vent    Vitals:  Blood pressure (!) 190/94, pulse 80, temperature 99 3 °F (37 4 °C), temperature source Tympanic Core, resp  rate 20, height 5' 1" (1 549 m), weight 73 kg (161 lb), SpO2 93 %  Imaging and other studies: I have personally reviewed pertinent reports  O2 Device: (P) vent     Plan    Respiratory Plan: (P) Home Bronchodilator Patient pathway, Vent/NIV/HFNC        Resp Comments: (P) Pt back from OR intubated and sedated  Placed on [facility] settings  Pt has hx of copd and takes spiriva and albuterol at home  He also wears 2L NC at baseline  TID and prn udns ordered

## 2018-12-27 NOTE — ANESTHESIA PROCEDURE NOTES
Lumbar Drain  Date/Time: 12/27/2018 8:30 AM  Performed by: Radha Solis by: Yg Goodwin   Consent: Verbal consent obtained  Written consent obtained  Risks and benefits: risks, benefits and alternatives were discussed  Consent given by: patient  Patient understanding: patient states understanding of the procedure being performed  Patient consent: the patient's understanding of the procedure matches consent given  Procedure consent: procedure consent matches procedure scheduled  Relevant documents: relevant documents present and verified  Test results: test results available and properly labeled  Site marked: the operative site was marked  Required items: required blood products, implants, devices, and special equipment available  Patient identity confirmed: arm band and anonymous protocol, patient vented/unresponsive  Time out: Immediately prior to procedure a "time out" was called to verify the correct patient, procedure, equipment, support staff and site/side marked as required  Indications: CSF drainage  Preparation: Patient was prepped and draped in the usual sterile fashion    Lumbar space: L3-L4 interspace  Patient's position: right lateral decubitus  Needle gauge: 18  Catheter Length: 13cm  Number of attempts: 2  Post-procedure: site cleaned and adhesive bandage applied  Patient tolerance: Patient tolerated the procedure well with no immediate complications

## 2018-12-28 ENCOUNTER — APPOINTMENT (INPATIENT)
Dept: RADIOLOGY | Facility: HOSPITAL | Age: 83
DRG: 219 | End: 2018-12-28
Payer: COMMERCIAL

## 2018-12-28 DIAGNOSIS — I71.2 ANEURYSM OF DESCENDING THORACIC AORTA (HCC): Primary | ICD-10-CM

## 2018-12-28 PROBLEM — J96.22 ACUTE ON CHRONIC RESPIRATORY FAILURE WITH HYPOXIA AND HYPERCAPNIA (HCC): Status: ACTIVE | Noted: 2018-12-28

## 2018-12-28 PROBLEM — J96.21 ACUTE ON CHRONIC RESPIRATORY FAILURE WITH HYPOXIA AND HYPERCAPNIA (HCC): Status: ACTIVE | Noted: 2018-12-28

## 2018-12-28 LAB
ABO GROUP BLD BPU: NORMAL
ANION GAP SERPL CALCULATED.3IONS-SCNC: 2 MMOL/L (ref 4–13)
ATRIAL RATE: 60 BPM
ATRIAL RATE: 83 BPM
BASE EXCESS BLDA CALC-SCNC: 11.9 MMOL/L
BASE EXCESS BLDA CALC-SCNC: 7.6 MMOL/L
BPU ID: NORMAL
BUN SERPL-MCNC: 16 MG/DL (ref 5–25)
CALCIUM SERPL-MCNC: 7.9 MG/DL (ref 8.3–10.1)
CHLORIDE SERPL-SCNC: 101 MMOL/L (ref 100–108)
CO2 SERPL-SCNC: 37 MMOL/L (ref 21–32)
CREAT SERPL-MCNC: 0.91 MG/DL (ref 0.6–1.3)
CROSSMATCH: NORMAL
ERYTHROCYTE [DISTWIDTH] IN BLOOD BY AUTOMATED COUNT: 13.2 % (ref 11.6–15.1)
GFR SERPL CREATININE-BSD FRML MDRD: 79 ML/MIN/1.73SQ M
GLUCOSE SERPL-MCNC: 105 MG/DL (ref 65–140)
GLUCOSE SERPL-MCNC: 113 MG/DL (ref 65–140)
GLUCOSE SERPL-MCNC: 115 MG/DL (ref 65–140)
GLUCOSE SERPL-MCNC: 138 MG/DL (ref 65–140)
GLUCOSE SERPL-MCNC: 99 MG/DL (ref 65–140)
HCO3 BLDA-SCNC: 38 MMOL/L (ref 22–28)
HCO3 BLDA-SCNC: 39.9 MMOL/L (ref 22–28)
HCT VFR BLD AUTO: 45.6 % (ref 36.5–49.3)
HFNC FLOW LPM: 50
HGB BLD-MCNC: 13.8 G/DL (ref 12–17)
MAGNESIUM SERPL-MCNC: 2.2 MG/DL (ref 1.6–2.6)
MCH RBC QN AUTO: 31.4 PG (ref 26.8–34.3)
MCHC RBC AUTO-ENTMCNC: 30.3 G/DL (ref 31.4–37.4)
MCV RBC AUTO: 104 FL (ref 82–98)
NASAL CANNULA: 6
NON VENT HFNC FIO2: 55
NON VENT TYPE HFNC: ABNORMAL
O2 CT BLDA-SCNC: 18.9 ML/DL (ref 16–23)
O2 CT BLDA-SCNC: 19.4 ML/DL (ref 16–23)
OXYHGB MFR BLDA: 91.8 % (ref 94–97)
OXYHGB MFR BLDA: 94.2 % (ref 94–97)
P AXIS: 58 DEGREES
P AXIS: 63 DEGREES
PCO2 BLDA: 66.8 MM HG (ref 36–44)
PCO2 BLDA: 84 MM HG (ref 36–44)
PH BLDA: 7.27 [PH] (ref 7.35–7.45)
PH BLDA: 7.39 [PH] (ref 7.35–7.45)
PLATELET # BLD AUTO: 132 THOUSANDS/UL (ref 149–390)
PMV BLD AUTO: 8.9 FL (ref 8.9–12.7)
PO2 BLDA: 71.6 MM HG (ref 75–129)
PO2 BLDA: 78.5 MM HG (ref 75–129)
POTASSIUM SERPL-SCNC: 4.4 MMOL/L (ref 3.5–5.3)
PR INTERVAL: 146 MS
PR INTERVAL: 154 MS
QRS AXIS: 105 DEGREES
QRS AXIS: 107 DEGREES
QRSD INTERVAL: 79 MS
QRSD INTERVAL: 88 MS
QT INTERVAL: 367 MS
QT INTERVAL: 504 MS
QTC INTERVAL: 432 MS
QTC INTERVAL: 504 MS
RBC # BLD AUTO: 4.39 MILLION/UL (ref 3.88–5.62)
SODIUM SERPL-SCNC: 140 MMOL/L (ref 136–145)
SPECIMEN SOURCE: ABNORMAL
SPECIMEN SOURCE: ABNORMAL
T WAVE AXIS: 51 DEGREES
T WAVE AXIS: 59 DEGREES
UNIT DISPENSE STATUS: NORMAL
UNIT PRODUCT CODE: NORMAL
UNIT RH: NORMAL
VENTRICULAR RATE: 60 BPM
VENTRICULAR RATE: 83 BPM
WBC # BLD AUTO: 9.23 THOUSAND/UL (ref 4.31–10.16)

## 2018-12-28 PROCEDURE — 82805 BLOOD GASES W/O2 SATURATION: CPT | Performed by: PHYSICIAN ASSISTANT

## 2018-12-28 PROCEDURE — 71045 X-RAY EXAM CHEST 1 VIEW: CPT

## 2018-12-28 PROCEDURE — 93005 ELECTROCARDIOGRAM TRACING: CPT

## 2018-12-28 PROCEDURE — 93010 ELECTROCARDIOGRAM REPORT: CPT | Performed by: INTERNAL MEDICINE

## 2018-12-28 PROCEDURE — 99024 POSTOP FOLLOW-UP VISIT: CPT | Performed by: SURGERY

## 2018-12-28 PROCEDURE — 94760 N-INVAS EAR/PLS OXIMETRY 1: CPT

## 2018-12-28 PROCEDURE — 82948 REAGENT STRIP/BLOOD GLUCOSE: CPT

## 2018-12-28 PROCEDURE — 83735 ASSAY OF MAGNESIUM: CPT | Performed by: PHYSICIAN ASSISTANT

## 2018-12-28 PROCEDURE — 99024 POSTOP FOLLOW-UP VISIT: CPT | Performed by: THORACIC SURGERY (CARDIOTHORACIC VASCULAR SURGERY)

## 2018-12-28 PROCEDURE — 94640 AIRWAY INHALATION TREATMENT: CPT

## 2018-12-28 PROCEDURE — 99233 SBSQ HOSP IP/OBS HIGH 50: CPT | Performed by: ANESTHESIOLOGY

## 2018-12-28 PROCEDURE — 85027 COMPLETE CBC AUTOMATED: CPT | Performed by: PHYSICIAN ASSISTANT

## 2018-12-28 PROCEDURE — 80048 BASIC METABOLIC PNL TOTAL CA: CPT | Performed by: PHYSICIAN ASSISTANT

## 2018-12-28 PROCEDURE — 94660 CPAP INITIATION&MGMT: CPT

## 2018-12-28 RX ORDER — DOCUSATE SODIUM 100 MG/1
100 CAPSULE, LIQUID FILLED ORAL 2 TIMES DAILY
Status: DISCONTINUED | OUTPATIENT
Start: 2018-12-28 | End: 2018-12-31 | Stop reason: HOSPADM

## 2018-12-28 RX ORDER — FUROSEMIDE 20 MG/1
20 TABLET ORAL DAILY
Status: DISCONTINUED | OUTPATIENT
Start: 2018-12-29 | End: 2018-12-31 | Stop reason: HOSPADM

## 2018-12-28 RX ORDER — FUROSEMIDE 10 MG/ML
40 INJECTION INTRAMUSCULAR; INTRAVENOUS ONCE
Status: COMPLETED | OUTPATIENT
Start: 2018-12-28 | End: 2018-12-28

## 2018-12-28 RX ADMIN — ACETAMINOPHEN 650 MG: 325 TABLET, FILM COATED ORAL at 10:56

## 2018-12-28 RX ADMIN — PANTOPRAZOLE SODIUM 40 MG: 40 TABLET, DELAYED RELEASE ORAL at 05:56

## 2018-12-28 RX ADMIN — IPRATROPIUM BROMIDE 0.5 MG: 0.5 SOLUTION RESPIRATORY (INHALATION) at 18:51

## 2018-12-28 RX ADMIN — SODIUM CHLORIDE 3 MG/HR: 0.9 INJECTION, SOLUTION INTRAVENOUS at 04:29

## 2018-12-28 RX ADMIN — FUROSEMIDE 40 MG: 10 INJECTION, SOLUTION INTRAMUSCULAR; INTRAVENOUS at 09:00

## 2018-12-28 RX ADMIN — TIOTROPIUM BROMIDE 18 MCG: 18 CAPSULE ORAL; RESPIRATORY (INHALATION) at 09:12

## 2018-12-28 RX ADMIN — DOCUSATE SODIUM 100 MG: 100 CAPSULE, LIQUID FILLED ORAL at 17:55

## 2018-12-28 RX ADMIN — AMLODIPINE BESYLATE 5 MG: 5 TABLET ORAL at 09:06

## 2018-12-28 RX ADMIN — LEVALBUTEROL HYDROCHLORIDE 1.25 MG: 1.25 SOLUTION, CONCENTRATE RESPIRATORY (INHALATION) at 18:50

## 2018-12-28 RX ADMIN — ONDANSETRON 4 MG: 2 INJECTION INTRAMUSCULAR; INTRAVENOUS at 11:06

## 2018-12-28 RX ADMIN — IPRATROPIUM BROMIDE 0.5 MG: 0.5 SOLUTION RESPIRATORY (INHALATION) at 08:12

## 2018-12-28 RX ADMIN — BUDESONIDE AND FORMOTEROL FUMARATE DIHYDRATE 2 PUFF: 160; 4.5 AEROSOL RESPIRATORY (INHALATION) at 17:55

## 2018-12-28 RX ADMIN — LEVALBUTEROL HYDROCHLORIDE 1.25 MG: 1.25 SOLUTION, CONCENTRATE RESPIRATORY (INHALATION) at 13:31

## 2018-12-28 RX ADMIN — METOPROLOL TARTRATE 50 MG: 50 TABLET, FILM COATED ORAL at 22:26

## 2018-12-28 RX ADMIN — MUPIROCIN 1 APPLICATION: 20 OINTMENT TOPICAL at 22:27

## 2018-12-28 RX ADMIN — ACETAMINOPHEN 650 MG: 325 TABLET, FILM COATED ORAL at 04:26

## 2018-12-28 RX ADMIN — MUPIROCIN 1 APPLICATION: 20 OINTMENT TOPICAL at 09:06

## 2018-12-28 RX ADMIN — LOSARTAN POTASSIUM 100 MG: 50 TABLET, FILM COATED ORAL at 09:05

## 2018-12-28 RX ADMIN — METOPROLOL TARTRATE 50 MG: 50 TABLET, FILM COATED ORAL at 09:06

## 2018-12-28 RX ADMIN — ATORVASTATIN CALCIUM 10 MG: 10 TABLET, FILM COATED ORAL at 17:55

## 2018-12-28 RX ADMIN — CLINDAMYCIN PHOSPHATE 900 MG: 900 INJECTION, SOLUTION INTRAVENOUS at 01:59

## 2018-12-28 RX ADMIN — BUDESONIDE AND FORMOTEROL FUMARATE DIHYDRATE 2 PUFF: 160; 4.5 AEROSOL RESPIRATORY (INHALATION) at 09:17

## 2018-12-28 RX ADMIN — CLINDAMYCIN PHOSPHATE 900 MG: 900 INJECTION, SOLUTION INTRAVENOUS at 09:05

## 2018-12-28 RX ADMIN — LEVALBUTEROL HYDROCHLORIDE 1.25 MG: 1.25 SOLUTION, CONCENTRATE RESPIRATORY (INHALATION) at 08:12

## 2018-12-28 RX ADMIN — IPRATROPIUM BROMIDE 0.5 MG: 0.5 SOLUTION RESPIRATORY (INHALATION) at 13:32

## 2018-12-28 NOTE — PROGRESS NOTES
Progress Note - Critical Care   Luiz Mac 82 y o  male MRN: [id number]  Unit/Bed#: [facility] 413-01 Encounter: [id number]      Attending Physician: Franco Oliver MD    24 Hour Events: POD # 1 s/p TEVAR and placement of lumbar drain  The patient was placed on cardene overnight as high as 4  This AM became hypoxic, ABG revealed component of hypercarbic/hypoxic respiratory failure  Placed on BiPAP which the patient did not tolerate, transitioned to HFNC with improvement in oxygenation  Allergies:    Allergies   Allergen Reactions    Penicillins Hives, Itching and Rash    Lisinopril Rash     Side pains and rash        Medications:   Scheduled Meds:    Current Facility-Administered Medications:  acetaminophen 650 mg Oral Q4H PRN Mena Buchanan PA-C    albuterol 2 5 mg Nebulization Q4H PRN Franco Oliver MD    amLODIPine 5 mg Oral Daily Haroon Metz PA-C    atorvastatin 10 mg Oral Daily With Dinner Mena Buchanan PA-C    bisacodyl 10 mg Rectal Daily PRN Mena Buchanan PA-C    budesonide-formoterol 2 puff Inhalation BID Mena Buchanan PA-C    clindamycin 900 mg Intravenous Q8H Mena Buchanan PA-C Last Rate: 900 mg (12/28/18 0159)   furosemide 20 mg Oral Daily Gina Metz PA-C    insulin lispro 1-5 Units Subcutaneous TID AC Lisa Victoria PA-C    insulin lispro 1-5 Units Subcutaneous HS Mena Buchanan PA-C    ipratropium 0 5 mg Nebulization TID Franco Oliver MD    levalbuterol 1 25 mg Nebulization TID Franco Oliver MD    losartan 100 mg Oral Daily Gina TRI Metz PA-C    metoprolol tartrate 50 mg Oral Q12H [address] Mena Buchanan PA-C    mupirocin 1 application Nasal I74X [address] Mena Buchanan PA-C    niCARdipine 1-15 mg/hr Intravenous Titrated Cristy Collier PA-C Last Rate: 3 mg/hr (12/28/18 4729)   ondansetron 4 mg Intravenous Q6H PRN Mena Buchanan PA-C    pantoprazole 40 mg Oral Early Morning Mena Buchanan PA-C    polyethylene glycol 17 g Oral Daily Lisa Beau Schulz PA-C    tiotropium 18 mcg Inhalation Daily Gracie Vidal PA-C        VTE Pharmacologic Prophylaxis: Reason for no pharmacologic prophylaxis lumbar drain  VTE Mechanical Prophylaxis: sequential compression device    Continuous Infusions:    niCARdipine 1-15 mg/hr Last Rate: 3 mg/hr (12/28/18 8400)     PRN Meds:    acetaminophen 650 mg Q4H PRN   albuterol 2 5 mg Q4H PRN   bisacodyl 10 mg Daily PRN   ondansetron 4 mg Q6H PRN       Home Medications:   Prior to Admission medications    Medication Sig Start Date End Date Taking? Authorizing Provider   acetaminophen (TYLENOL) 325 mg tablet Take 1-2 tablets by mouth every 4 (four) hours as needed 8/8/17  Yes Historical Provider, MD   albuterol (2 5 mg/3 mL) 0 083 % nebulizer solution Take 1 vial (2 5 mg total) by nebulization every 6 (six) hours as needed for wheezing or shortness of breath 6/26/18  Yes Braeden Robertson MD   albuterol (VENTOLIN HFA) 90 mcg/act inhaler Inhale 2 puffs 8/8/17  Yes Historical Provider, MD   amLODIPine (NORVASC) 5 mg tablet Take 1 tablet (5 mg total) by mouth daily 11/6/18  Yes Edie Arciniega MD   aspirin (ECOTRIN LOW STRENGTH) 81 mg EC tablet Take 81 mg by mouth daily   Yes Historical Provider, MD   atorvastatin (LIPITOR) 10 mg tablet Take 10 mg by mouth daily   Yes Historical Provider, MD   budesonide-formoterol (SYMBICORT) 160-4 5 mcg/act inhaler Inhale 2 puffs 2 (two) times a day Rinse mouth after use     Yes Historical Provider, MD   furosemide (LASIX) 20 mg tablet Take 1 tablet (20 mg total) by mouth daily 8/2/18  Yes Raúl Medrano MD   losartan (COZAAR) 100 MG tablet TAKE ONE TABLET BY MOUTH ONCE DAILY 12/3/18  Yes Braeden Robertson MD   pirocin [facility]) 2 % nasal ointment into each nostril 2 (two) times a day Start 5 days prior to surgery 9/27/18  Yes Haroon Brown PA-C   sandrarocin [facility]) 2 % nasal ointment into each nostril 2 (two) times a day 12/6/18  Yes Gracie Vidal PA-C   pantoprazole (PROTONIX) 40 mg tablet Take 1 tablet (40 mg total) by mouth daily 18  Yes Nehemiah Lomas MD   tiotropium [facility]) 18 mcg inhalation capsule Place 18 mcg into inhaler and inhale 18 Yes Historical Provider, MD   metoprolol tartrate (LOPRESSOR) 50 mg tablet Take 1 tablet (50 mg total) by mouth every 12 (twelve) hours 18   Nehemiah Lomas MD       Vitals:   Vitals:    18 0500 18 0515 18 0534 18 0600   BP:       Pulse: 76   78   Resp: (!) 28   (!) 24   Temp:       TempSrc:       SpO2: 94% 91% 95% 91%   Weight:       Height:         Arterial Line BP: 116/54  Arterial Line MAP (mmHg): 76 mmHg    Tele Rhythm: NSR This was personally reviewed by myself  Respiratory:  SpO2: SpO2: 91 %  O2 Flow Rate (L/min): 6 L/min    Temperature: Temp (24hrs), Av °F (36 7 °C), Min:97 4 °F (36 3 °C), Max:98 6 °F (37 °C)  Current: Temperature: 97 7 °F (36 5 °C)    Weights:   Weight (last 2 days)     Date/Time   Weight    18 0644  73 (161)            IBW: 52 3 kg  Body mass index is 30 42 kg/m²  Hemodynamic Monitoring:  N/A       Intake and Outputs:    Intake/Output Summary (Last 24 hours) at 18 0646  Last data filed at 18 0600   Gross per 24 hour   Intake          2236 83 ml   Output             1879 ml   Net           357 83 ml     I/O last 24 hours: In: 2236 8 [I V :2186 8; IV Piggyback:50]  Out: 0694 [LNSTB:2700; Drains:130;  Other:34; Blood:50]    UOP: 75-125cc/hour     Labs:    Results from last 7 days  Lab Units 18  0435 18  1158 18  1145   WBC Thousand/uL 9 23  --   --    HEMOGLOBIN g/dL 13 8  --  14 0   I STAT HEMOGLOBIN g/dl  --  14 6  --    HEMATOCRIT % 45 6  --  45 7   HEMATOCRIT, ISTAT %  --  43  --    PLATELETS Thousands/uL 132*  --   --        Results from last 7 days  Lab Units 18  0435 18  1258 18  1158   SODIUM mmol/L 140 141  --    POTASSIUM mmol/L 4 4 4 0  --    CHLORIDE mmol/L 101 104  --    CO2 mmol/L 37* 32  --    CO2, I-STAT mmol/L  --   --  36*   BUN mg/dL 16 12  --    CREATININE mg/dL 0 91 0 92  --    CALCIUM mg/dL 7 9* 8 0*  --    GLUCOSE, ISTAT mg/dl  --   --  136       Baseline creat 0 9-1 2      Results from last 7 days  Lab Units 12/28/18  0435   MAGNESIUM mg/dL 2 2       Results from last 7 days  Lab Units 12/24/18  1143   INR  1 04   PTT seconds 30       Results from last 7 days  Lab Units 12/24/18  1143   HEMOGLOBIN A1C % 7 1*       Imaging:  AM CXR: Bibasilar opacities consistent with atelectasis  This was personally reviewed by myself in PACS  AM EKG: NSR This was personally reviewed by myself  Physical Exam:    General Appearance:  WNWD, NAD  HENT: L pupil small, displaced, R pupil round and reactive  Neck: No JVD  RIJ CVC in place  Eyes: No icterus  Cardiac: regular rate and regular rhythm, no murmur, no rub  Pulmonary: decreased breath sounds @ B/L bases to auscultation bilaterally  Gastrointestinal: soft, no distention,  Non-tender  : Hardin present: yes   Musculoskeletal: 1+ edema bilaterally  Neuro:  Screening neurologic exam reveals no deficits  following commands  Psych: Mood and affect approriate  Skin: warm, dry  Incisions: groin incisions c/d/i, soft, histrocryl in place    Invasive lines and devices:   Invasive Devices     Central Venous Catheter Line            CVC Central Lines 12/27/18 less than 1 day          Peripheral Intravenous Line            Peripheral IV 12/27/18 Left Arm less than 1 day          Arterial Line            Arterial Line 12/27/18 Right Radial less than 1 day          Drain            Lumbar Drain less than 1 day    Urethral Catheter Latex 16 Fr  less than 1 day                Assessment:  Principal Problem:    Thoracic aortic aneurysm without rupture (HCC)  Active Problems:    COPD (chronic obstructive pulmonary disease) (HCC)    Benign essential hypertension    Aneurysm, aorta, thoracic (HCC)    Acute on chronic respiratory failure with hypoxia and hypercapnia (HCC)      Plan:    Neuro:   · Pain controlled with: percocet and tylenol PRN  · Regulate sleep/wake cycle  · Restoril QHS PRN  · Trend neuro exam  · Lumbar drain in place, consider d/c today  CV:   · Cardiac infusions: Cardene, 1 mg/hour  · Wean cardene to off as able  · MAP goal > 65 and CI >2 2  · D/C [place] Curly catheter and cordis  · D/c Arterial line  · Rhythm: NSR  · Follow rhythm on telemetry  · Resume home lopressor 50 BID  · Continue home norvasc and losartan  · Consult cardiology for post-op management  · Hold DVT PPX while lumbar drain in place    Lung:   · Acute post-op pulmonary insufficiency; Requiring high flow via nasal cannula, secondary to COPD and atelectasis  Continue incentive spirometry/coughing/deep breathing exercises  Wean supplemental oxygen as tolerated for saturation > 90%  · Wean HFNC as tolerated  · SpO2 goal >88-92%  · Continue IS, deep breathing and coughing exercises      GI:   · Continue PPI for stress ulcer prophylaxis  · Continue bowel regimen  · Zofran PRN for nausea    FEN:   Diuretic plan: hold home lasix 20mg PO, administer 40mg IV   · Goal 24 hour fluid balance: even to negative  · Nutrition/diet plan: Initiate TLC 2 3 gm sodium diet with 1800 mL fluid restriction   · Replete electrolytes with goals: K >4 0, Mag >2 0, and Phos >3 0    :   · Indwelling Hardin present: yes   · maintain Hardin while lumbar drain in place  · Trend UOP and BUN/creat  · Strict I and O    ID:   · Trend temps and WBC count  · Maintain normothermia  · Tylenol PRN for fevers  Heme:   · Trend hgb and plts  · Transfuse as needed for goal hgb >8    Endo:   · Glycemic control plan: History of diabetes: Continue home insulin regimen    MSK/Skin:  · Mobility goal: OOB and ambulating as able  · PT consult: yes  · OT consult: yes  · Frequent turning and pressure off-loading  · Local wound care as needed    Disposition:  ICU level care, possible transfer to telemetry if lumbar drain d/bubba    Collaborative bedside rounds performed with cardiac surgery attending and bedside RN      SIGNATURE: Jenn Sarah PA-C  DATE: December 28, 2018  TIME: 6:46 AM

## 2018-12-28 NOTE — PROGRESS NOTES
ICP's in high teens to 21  No neurological deficits noted  CCM aware, will continue to closely monitor neurological exam and ICP trends

## 2018-12-28 NOTE — PROGRESS NOTES
Progress Note - Cardiothoracic Surgery   [facility] 82 y o  male MRN: [id number]  Unit/Bed#: [facility] 413-01 Encounter: [id number]      POD # 1 s/p TEVAR    Pt seen/examined  Interval history and data reviewed with critical care team   Pt doing well  No specific complaints  Neuro intact      Medications:   Scheduled Meds:  Current Facility-Administered Medications:  acetaminophen 650 mg Oral Q4H PRN Leny Czaares PA-C    albuterol 2 5 mg Nebulization Q4H PRN Tika Lopez MD    amLODIPine 5 mg Oral Daily Jacky Metz PA-C    atorvastatin 10 mg Oral Daily With Dinner Leny Cazares PA-C    bisacodyl 10 mg Rectal Daily PRN Leny Cazares PA-C    budesonide-formoterol 2 puff Inhalation BID Leny Cazares PA-C    clindamycin 900 mg Intravenous Q8H Leny Cazares PA-C Last Rate: 900 mg (12/28/18 0159)   furosemide 20 mg Oral Daily Ginaema Metz PA-C    insulin lispro 1-5 Units Subcutaneous TID AC Lisa Lara PA-C    insulin lispro 1-5 Units Subcutaneous HS Leny Cazares PA-C    ipratropium 0 5 mg Nebulization TID Tika Lopez MD    levalbuterol 1 25 mg Nebulization TID Tika Lopez MD    losartan 100 mg Oral Daily Jacky Metz PA-C    metoprolol tartrate 50 mg Oral Q12H [address] Leny Cazares PA-C    mupirocin 1 application Nasal T96S [address] Leny Cazares PA-C    niCARdipine 1-15 mg/hr Intravenous Titrated Chika See PA-C Last Rate: 1 mg/hr (12/28/18 0600)   ondansetron 4 mg Intravenous Q6H PRN Leny Cazares PA-C    pantoprazole 40 mg Oral Early Morning Leny Cazares PA-C    polyethylene glycol 17 g Oral Daily Leny Cazares PA-C    tiotropium 18 mcg Inhalation Daily Leny Cazares PA-C      Continuous Infusions:  niCARdipine 1-15 mg/hr Last Rate: 1 mg/hr (12/28/18 0600)     PRN Meds:   acetaminophen    albuterol    bisacodyl    ondansetron    Vitals: Blood pressure 129/60, pulse 78, temperature 97 7 °F (36 5 °C), temperature source Oral, resp  rate (!) 24, height 5' 1" (1 549 m), weight 73 kg (161 lb), SpO2 91 %  ,Body mass index is 30 42 kg/m²  I/O last 24 hours: In: 2236 8 [I V :2186 8; IV Piggyback:50]  Out: 9184 [FRAUT:4075; Drains:130; Other:34; Blood:50]  Invasive Devices     Central Venous Catheter Line            CVC Central Lines 12/27/18 less than 1 day          Peripheral Intravenous Line            Peripheral IV 12/27/18 Left Arm less than 1 day          Arterial Line            Arterial Line 12/27/18 Right Radial less than 1 day          Drain            Lumbar Drain less than 1 day    Urethral Catheter Latex 16 Fr  less than 1 day                  Lab, Imaging and other studies:     Results from last 7 days  Lab Units 12/28/18  0435 12/27/18  1158 12/27/18  1145   WBC Thousand/uL 9 23  --   --    HEMOGLOBIN g/dL 13 8  --  14 0   I STAT HEMOGLOBIN g/dl  --  14 6  --    HEMATOCRIT % 45 6  --  45 7   HEMATOCRIT, ISTAT %  --  43  --    PLATELETS Thousands/uL 132*  --   --        Results from last 7 days  Lab Units 12/28/18  0435 12/27/18  1258 12/27/18  1158   POTASSIUM mmol/L 4 4 4 0  --    CHLORIDE mmol/L 101 104  --    CO2 mmol/L 37* 32  --    CO2, I-STAT mmol/L  --   --  36*   BUN mg/dL 16 12  --    CREATININE mg/dL 0 91 0 92  --    GLUCOSE, ISTAT mg/dl  --   --  136   CALCIUM mg/dL 7 9* 8 0*  --        Results from last 7 days  Lab Units 12/24/18  1143   INR  1 04   PTT seconds 30     Recent Labs      12/28/18   0435   PHART  7 273*   NDT1XQF  38 0*   PO2ART  71 6*   ZNI8VWX  84 0*   BEART  7 6           Plan:    Cap spinal drain, DC in 8 hrs if no neuro deficit  Start home meds  Ambulate  Incentive spirometry  Diuresis  PO ASA/Statin/B blocker          SIGNATURE: Ken Cooper MD  DATE: December 28, 2018  TIME: 7:39 AM

## 2018-12-28 NOTE — PROGRESS NOTES
Progress Note - Vascular Surgery  Ruben Moscoso 82 y o  male MRN: [id number]  Unit/Bed#: [facility] 413-01 Encounter: [id number]    Assessment:  83M w/descending TAA s/p TEVAR 12/27  Plan:  - advance diet as tolerated  - capping of lumbar drain per CT surgery  - prn pain control  - wean cardene  - lines per ICU/CT surgery  - monitor neurovascular exams      Subjective/Objective   Subjective: no acute events, no complaints    Objective:    Blood pressure 129/60, pulse 78, temperature 97 7 °F (36 5 °C), temperature source Oral, resp  rate (!) 24, height 5' 1" (1 549 m), weight 73 kg (161 lb), SpO2 91 %  ,Body mass index is 30 42 kg/m²  I/O last 24 hours: In: 2236 8 [I V :2186 8; IV Piggyback:50]  Out: 5323 [UIKHX:5536; Drains:130;  Other:34; Blood:50]    Invasive Devices     Central Venous Catheter Line            CVC Central Lines 12/27/18 less than 1 day          Peripheral Intravenous Line            Peripheral IV 12/27/18 Left Arm less than 1 day          Arterial Line            Arterial Line 12/27/18 Right Radial less than 1 day          Drain            Lumbar Drain less than 1 day    Urethral Catheter Latex 16 Fr  less than 1 day                Physical Exam:   NAD, alert and oriented x3  Normocephalic, atraumatic  MMM, EOMI, PERRLA  Norm resp effort on HFNC  RRR  Abd soft, NT/ND  B/l groin sites cdi  Palp DP R  Palp PT L  No calf tenderness or peripheral edema  Motor/sensation intact in distal extremities  CN grossly intact  -rash/lesions      Lab, Imaging and other studies:  Lab Results   Component Value Date    WBC 9 23 12/28/2018    HGB 13 8 12/28/2018    HCT 45 6 12/28/2018     (H) 12/28/2018     (L) 12/28/2018      Lab Results   Component Value Date    GLUCOSE 136 12/27/2018    CALCIUM 7 9 (L) 12/28/2018     03/01/2014    K 4 4 12/28/2018    CO2 37 (H) 12/28/2018     12/28/2018    BUN 16 12/28/2018    CREATININE 0 91 12/28/2018       VTE Pharmacologic Prophylaxis: Sequential compression device (Venodyne)   VTE Mechanical Prophylaxis: sequential compression device

## 2018-12-28 NOTE — PROGRESS NOTES
POD # 1 s/p TEVAR with spinal drain in place  Request by surgeon to remove spinal drain 8 hours after it is capped  Spoke to nursing staff: spinal drain capped since 7 am today  plts 130k  Pt is not on anticoagulation at this time  Spinal drain removed, tip intact  Area of backside cleaned and dressed with bandaid  Please have patient lay flat for 1 hour, then can sit up  Will ask critical care team to continue neuro checks and monitor for spinal headache

## 2018-12-29 ENCOUNTER — APPOINTMENT (INPATIENT)
Dept: RADIOLOGY | Facility: HOSPITAL | Age: 83
DRG: 219 | End: 2018-12-29
Payer: COMMERCIAL

## 2018-12-29 PROBLEM — D69.6 THROMBOCYTOPENIA (HCC): Status: ACTIVE | Noted: 2018-12-29

## 2018-12-29 PROBLEM — E87.8 HYPOCHLOREMIA: Status: ACTIVE | Noted: 2018-12-29

## 2018-12-29 PROBLEM — D72.829 LEUKOCYTOSIS: Status: ACTIVE | Noted: 2018-12-29

## 2018-12-29 LAB
ANION GAP SERPL CALCULATED.3IONS-SCNC: 2 MMOL/L (ref 4–13)
BUN SERPL-MCNC: 19 MG/DL (ref 5–25)
CALCIUM SERPL-MCNC: 8.5 MG/DL (ref 8.3–10.1)
CHLORIDE SERPL-SCNC: 98 MMOL/L (ref 100–108)
CO2 SERPL-SCNC: 39 MMOL/L (ref 21–32)
CREAT SERPL-MCNC: 0.86 MG/DL (ref 0.6–1.3)
ERYTHROCYTE [DISTWIDTH] IN BLOOD BY AUTOMATED COUNT: 13.3 % (ref 11.6–15.1)
GFR SERPL CREATININE-BSD FRML MDRD: 81 ML/MIN/1.73SQ M
GLUCOSE SERPL-MCNC: 107 MG/DL (ref 65–140)
GLUCOSE SERPL-MCNC: 122 MG/DL (ref 65–140)
GLUCOSE SERPL-MCNC: 132 MG/DL (ref 65–140)
GLUCOSE SERPL-MCNC: 143 MG/DL (ref 65–140)
GLUCOSE SERPL-MCNC: 147 MG/DL (ref 65–140)
HCT VFR BLD AUTO: 45.9 % (ref 36.5–49.3)
HGB BLD-MCNC: 13.7 G/DL (ref 12–17)
MAGNESIUM SERPL-MCNC: 2.4 MG/DL (ref 1.6–2.6)
MCH RBC QN AUTO: 31.1 PG (ref 26.8–34.3)
MCHC RBC AUTO-ENTMCNC: 29.8 G/DL (ref 31.4–37.4)
MCV RBC AUTO: 104 FL (ref 82–98)
PHOSPHATE SERPL-MCNC: 2.9 MG/DL (ref 2.3–4.1)
PLATELET # BLD AUTO: 125 THOUSANDS/UL (ref 149–390)
PMV BLD AUTO: 9.1 FL (ref 8.9–12.7)
POTASSIUM SERPL-SCNC: 4.2 MMOL/L (ref 3.5–5.3)
RBC # BLD AUTO: 4.41 MILLION/UL (ref 3.88–5.62)
SODIUM SERPL-SCNC: 139 MMOL/L (ref 136–145)
WBC # BLD AUTO: 11.21 THOUSAND/UL (ref 4.31–10.16)

## 2018-12-29 PROCEDURE — 99024 POSTOP FOLLOW-UP VISIT: CPT | Performed by: THORACIC SURGERY (CARDIOTHORACIC VASCULAR SURGERY)

## 2018-12-29 PROCEDURE — G8978 MOBILITY CURRENT STATUS: HCPCS

## 2018-12-29 PROCEDURE — 82948 REAGENT STRIP/BLOOD GLUCOSE: CPT

## 2018-12-29 PROCEDURE — 85027 COMPLETE CBC AUTOMATED: CPT | Performed by: PHYSICIAN ASSISTANT

## 2018-12-29 PROCEDURE — G8979 MOBILITY GOAL STATUS: HCPCS

## 2018-12-29 PROCEDURE — 97163 PT EVAL HIGH COMPLEX 45 MIN: CPT

## 2018-12-29 PROCEDURE — 71045 X-RAY EXAM CHEST 1 VIEW: CPT

## 2018-12-29 PROCEDURE — 94760 N-INVAS EAR/PLS OXIMETRY 1: CPT

## 2018-12-29 PROCEDURE — 94640 AIRWAY INHALATION TREATMENT: CPT

## 2018-12-29 PROCEDURE — 80048 BASIC METABOLIC PNL TOTAL CA: CPT | Performed by: PHYSICIAN ASSISTANT

## 2018-12-29 PROCEDURE — 83735 ASSAY OF MAGNESIUM: CPT | Performed by: PHYSICIAN ASSISTANT

## 2018-12-29 PROCEDURE — 84100 ASSAY OF PHOSPHORUS: CPT | Performed by: PHYSICIAN ASSISTANT

## 2018-12-29 RX ORDER — ASPIRIN 81 MG/1
81 TABLET ORAL DAILY
Status: DISCONTINUED | OUTPATIENT
Start: 2018-12-29 | End: 2018-12-31 | Stop reason: HOSPADM

## 2018-12-29 RX ORDER — AMLODIPINE BESYLATE 10 MG/1
10 TABLET ORAL DAILY
Status: DISCONTINUED | OUTPATIENT
Start: 2018-12-29 | End: 2018-12-31 | Stop reason: HOSPADM

## 2018-12-29 RX ADMIN — LEVALBUTEROL HYDROCHLORIDE 1.25 MG: 1.25 SOLUTION, CONCENTRATE RESPIRATORY (INHALATION) at 13:29

## 2018-12-29 RX ADMIN — ATORVASTATIN CALCIUM 10 MG: 10 TABLET, FILM COATED ORAL at 17:14

## 2018-12-29 RX ADMIN — IPRATROPIUM BROMIDE 0.5 MG: 0.5 SOLUTION RESPIRATORY (INHALATION) at 20:34

## 2018-12-29 RX ADMIN — IPRATROPIUM BROMIDE 0.5 MG: 0.5 SOLUTION RESPIRATORY (INHALATION) at 13:29

## 2018-12-29 RX ADMIN — LEVALBUTEROL HYDROCHLORIDE 1.25 MG: 1.25 SOLUTION, CONCENTRATE RESPIRATORY (INHALATION) at 07:09

## 2018-12-29 RX ADMIN — ASPIRIN 81 MG: 81 TABLET, COATED ORAL at 10:27

## 2018-12-29 RX ADMIN — TIOTROPIUM BROMIDE 18 MCG: 18 CAPSULE ORAL; RESPIRATORY (INHALATION) at 09:09

## 2018-12-29 RX ADMIN — ACETAMINOPHEN 650 MG: 325 TABLET, FILM COATED ORAL at 23:39

## 2018-12-29 RX ADMIN — LEVALBUTEROL HYDROCHLORIDE 1.25 MG: 1.25 SOLUTION, CONCENTRATE RESPIRATORY (INHALATION) at 20:34

## 2018-12-29 RX ADMIN — IPRATROPIUM BROMIDE 0.5 MG: 0.5 SOLUTION RESPIRATORY (INHALATION) at 07:09

## 2018-12-29 RX ADMIN — METOPROLOL TARTRATE 50 MG: 50 TABLET, FILM COATED ORAL at 22:19

## 2018-12-29 RX ADMIN — PANTOPRAZOLE SODIUM 40 MG: 40 TABLET, DELAYED RELEASE ORAL at 05:22

## 2018-12-29 RX ADMIN — MUPIROCIN 1 APPLICATION: 20 OINTMENT TOPICAL at 09:08

## 2018-12-29 RX ADMIN — BUDESONIDE AND FORMOTEROL FUMARATE DIHYDRATE 2 PUFF: 160; 4.5 AEROSOL RESPIRATORY (INHALATION) at 09:09

## 2018-12-29 RX ADMIN — BUDESONIDE AND FORMOTEROL FUMARATE DIHYDRATE 2 PUFF: 160; 4.5 AEROSOL RESPIRATORY (INHALATION) at 17:14

## 2018-12-29 RX ADMIN — AMLODIPINE BESYLATE 10 MG: 10 TABLET ORAL at 09:07

## 2018-12-29 RX ADMIN — FUROSEMIDE 20 MG: 20 TABLET ORAL at 09:07

## 2018-12-29 RX ADMIN — LOSARTAN POTASSIUM 100 MG: 50 TABLET, FILM COATED ORAL at 09:07

## 2018-12-29 RX ADMIN — DOCUSATE SODIUM 100 MG: 100 CAPSULE, LIQUID FILLED ORAL at 17:14

## 2018-12-29 RX ADMIN — METOPROLOL TARTRATE 50 MG: 50 TABLET, FILM COATED ORAL at 09:07

## 2018-12-29 RX ADMIN — POLYETHYLENE GLYCOL 3350 17 G: 17 POWDER, FOR SOLUTION ORAL at 09:07

## 2018-12-29 RX ADMIN — DOCUSATE SODIUM 100 MG: 100 CAPSULE, LIQUID FILLED ORAL at 09:07

## 2018-12-29 RX ADMIN — MUPIROCIN 1 APPLICATION: 20 OINTMENT TOPICAL at 22:19

## 2018-12-29 NOTE — PROGRESS NOTES
Progress Note - Vascular Surgery  Francisco Cortes 82 y o  male MRN: [id number]  Unit/Bed#: [facility] 422-01 Encounter: [id number]    Assessment:  83M w/descending TAA s/p TEVAR 12/27  Plan:  - diet as tolerated  - prn pain control  - monitor neurovascular exams  - wean HFNC per primary team  - f/u AM CXR  - will follow on a peripheral basis      Subjective/Objective   Subjective: still on high flow, in chair, wants to go back to bed    Objective:    Blood pressure 145/69, pulse 71, temperature 98 5 °F (36 9 °C), temperature source Oral, resp  rate 18, height 5' 1" (1 549 m), weight 71 7 kg (158 lb 1 1 oz), SpO2 97 %  ,Body mass index is 29 87 kg/m²  I/O last 24 hours:   In: 550 3 [I V :550 3]  Out: 3400 [Urine:3300; Drains:100]    Invasive Devices     Central Venous Catheter Line            CVC Central Lines 12/27/18 1 day          Peripheral Intravenous Line            Peripheral IV 12/27/18 Left Arm 1 day                Physical Exam:   NAD, alert and oriented x3  Normocephalic, atraumatic  MMM, EOMI, PERRLA  Norm resp effort on HFNC 50/50%  RRR  Abd soft, NT/ND  B/l groin sites cdi, with some ecchymosis at R side  Palp DP R, palp PT L  No calf tenderness or peripheral edema  Motor/sensation intact in distal extremities  CN grossly intact  -rash/lesions        Lab, Imaging and other studies:  Lab Results   Component Value Date    WBC 11 21 (H) 12/29/2018    HGB 13 7 12/29/2018    HCT 45 9 12/29/2018     (H) 12/29/2018     (L) 12/29/2018      Lab Results   Component Value Date    GLUCOSE 136 12/27/2018    CALCIUM 8 5 12/29/2018     03/01/2014    K 4 2 12/29/2018    CO2 39 (H) 12/29/2018    CL 98 (L) 12/29/2018    BUN 19 12/29/2018    CREATININE 0 86 12/29/2018       VTE Pharmacologic Prophylaxis: Sequential compression device (Venodyne)   VTE Mechanical Prophylaxis: sequential compression device

## 2018-12-29 NOTE — PLAN OF CARE
CARDIOVASCULAR - ADULT     Maintains optimal cardiac output and hemodynamic stability Progressing     Absence of cardiac dysrhythmias or at baseline rhythm Progressing        DISCHARGE PLANNING     Discharge to home or other facility with appropriate resources Progressing        HEMATOLOGIC - ADULT     Maintains hematologic stability Progressing        INFECTION - ADULT     Absence or prevention of progression during hospitalization Progressing        NEUROSENSORY - ADULT     Achieves stable or improved neurological status Progressing        PAIN - ADULT     Verbalizes/displays adequate comfort level or baseline comfort level Progressing        Potential for Falls     Patient will remain free of falls Progressing        Prexisting or High Potential for Compromised Skin Integrity     Skin integrity is maintained or improved Progressing        RESPIRATORY - ADULT     Achieves optimal ventilation and oxygenation Progressing        SAFETY ADULT     Patient will remain free of falls Progressing     Maintain or return to baseline ADL function Progressing

## 2018-12-29 NOTE — PLAN OF CARE
Problem: PHYSICAL THERAPY ADULT  Goal: Performs mobility at highest level of function for planned discharge setting  See evaluation for individualized goals  Treatment/Interventions: Functional transfer training, LE strengthening/ROM, Elevations, Therapeutic exercise, Endurance training, Cognitive reorientation, Patient/family training, Equipment eval/education, Bed mobility, Gait training, Spoke to nursing, Spoke to case management  Equipment Recommended: Sofi Romero (w/ (A))       See flowsheet documentation for full assessment, interventions and recommendations  Prognosis: Good  Problem List: Decreased strength, Decreased endurance, Impaired balance, Decreased mobility, Decreased cognition, Decreased safety awareness  Assessment: Pt is 82 y o  male admitted with hx Dx of Thoracic aortic aneurysm without rupture and underwent Endovascular thoracic aortic aneurysm repair on 12/27/2018; postop course included: lumbar drain in place; acute respiratory failure requiring high flow via nasal cannula  Pt 's comorbidities affecting POC include: COPD, DVT, HTN, PVD, and CAD and personal factors of: advanced age, SHYANNE, steps in the house, and primarily Khmer speaking  Pt's clinical presentation is currently  unstable/unpredictable which is evident in ongoing suppl O2 needs at 5 L/min, telem monitoring, need for input for task focus and mobility technique and need for assist w/ all phases of observed mobility incl amb w/ rw when usually mobilizing independently  Pt presents w/ generalized weakness, incl decreased LE strength, decreased functional endurance and activity tolerance w/ decreased O2 sat post mobilization on 4 L of O2, impaired balance w/ associated gait deviations requiring use of rw at this time and fall risk  Will cont to follow pt in PT for progressive mobilization to address above functional deficits and to max level of (I), endurance, and safety   Currently recommend rehab upon D/C when medically cleared as pt's overall functional mobility status appears to be below premorbid level  Will cont to follow until then  Barriers to Discharge: Inaccessible home environment (steps in the house)     Recommendation: Post acute IP rehab          See flowsheet documentation for full assessment

## 2018-12-29 NOTE — PHYSICAL THERAPY NOTE
Physical Therapy Evaluation     Patient's Name: Jessica Abebe    Admitting Diagnosis  Thoracic aortic [facility] without rupture ([facility] ) [I71 2]    Problem List  Patient Active Problem List   Diagnosis    Hypertension    COPD [facility]chronic obstructive pulmonary disease) [facility] )    Descending aortic aneurysm ([facility] )    History of DVT (deep vein thrombosis)    Benign essential hypertension    Thoracic aortic aneurysm (HCC)    Chronic respiratory failure with hypoxia (HCC)    Varicose veins of both lower extremities with pain    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HCC)    Edema of both legs    Snoring    Thoracic aortic aneurysm without rupture ([facility] )    Aneurysm, aorta, thoracic ([facility] )    Acute on chronic respiratory failure with hypoxia and hypercapnia (HCC)    Leukocytosis    Thrombocytopenia ([facility] )    Hypochloremia       Past Medical History  Past Medical History:   Diagnosis Date    Appendicolith     Ascending aortic aneurysm (HCC)     3 7    Asthma     BPH (benign prostatic hyperplasia)     CAD (coronary artery disease)     noted on CT scan    COPD (chronic obstructive pulmonary disease) ([facility] )     Descending thoracic aortic aneurysm ([facility] )     Diverticulosis     Former tobacco use     GERD (gastroesophageal reflux disease)     History of DVT (deep vein thrombosis)     Left leg    History of transfusion     Hypertension     Inguinal hernia     right    Nephrolithiasis     Oxygen dependent     2LNC    Prostate calculus     PVD (peripheral vascular disease) ([facility] )     Ulcer     Varicose vein of leg     b/l       Past Surgical History  Past Surgical History:   Procedure Laterality Date    ESOPHAGOGASTRODUODENOSCOPY      INGUINAL HERNIA REPAIR Bilateral     IR TEVAR  12/27/2018    DC ENDOVASC TAA REINCL SUBCL N/A 12/27/2018    Procedure: TEVAR - endovascular thoracic aortic aneurysm repair;  Surgeon: Winter Ortega MD;  [facility]: [facility];  Service: Vascular    VARICOSE VEIN SURGERY Bilateral     vein stripping          12/29/18 1040   Note Type   Note type Eval only   Pain Assessment   Pain Assessment No/denies pain   Home Living   Type of [address] Two [address]; Able to live on main level with bedroom/bathroom  (1st floor set-up is available as per spouse; 4-5 SHYANNE w/ HR)   Prior Function   Level of [place] Independent with ADLs and functional mobility  (amb w/o AD)   Lives With Spouse   Receives Help From Family   Comments chronic O2 at home at 2L/min (per chart)   Restrictions/Precautions   Braces or Orthoses (none, as per family)   Other Precautions Cognitive; Chair Alarm;Contact/isolation;Cardiac/sternal;Multiple lines;Telemetry; Fall Risk;O2  (5 L of O2 at rest; nsg requested 4 L for amb; chair alarm on)   General   Additional Pertinent History cleared for assessment (spoke to nsg)   Family/Caregiver Present Yes  (spouse)   Cognition   Overall Cognitive Status Impaired   Arousal/Participation Responsive   Orientation Level Oriented to person;Oriented to situation   Memory Decreased recall of precautions   Following Commands Follows one step commands with increased time or repetition   Comments Pt is observed resting in the chair; slowly arousable; appears to be generally disoriented; Belarusian speaking mainly (rehab aide is present to (A) w/ interpretation; pt's spouse is also able to communicate in [place]); pt is agreeable to mobilize; denies dizziness;   RUE Assessment   RUE Assessment WFL  (AROM)   LUE Assessment   LUE Assessment WFL  (AROM)   RLE Assessment   RLE Assessment WFL  (AROM)   Strength RLE   RLE Overall Strength (fair + (grossly))   LLE Assessment   LLE Assessment WFL  (AROM)   Strength LLE   LLE Overall Strength (fair + (grossly))   Transfers   Sit to Stand 3  Moderate assistance   Additional items Assist x 1;Verbal cues; Increased time required   Stand to Sit 4  Minimal assistance   Additional items Assist x 1;Verbal cues; Increased time required   Additional Comments Pt was reclined in the chair w/ LE elevated at the end of session; call bell w/in reach; chair alarm on   Ambulation/Elevation   Gait pattern Excessively slow; Short stride; Inconsistent rafia;Narrow RICHARD   Gait Assistance 4  Minimal assist   Additional items Assist x 1;Verbal cues; Tactile cues  (stand by (A) of 2nd for lines)   Assistive Device Rolling walker  (brief trial w/o AD-->increased gait instability; back to rw)   Distance 120 ft (incl 5 ft distance w/o AD at which point mod (A) required)   Stair Management Assistance (not appropriate at this time)   Balance   Static Sitting Fair -   Static Standing Poor +  (supported)   Ambulatory Poor +  (w/ rw)   Endurance Deficit   Endurance Deficit Yes   Endurance Deficit Description On 5 L of O2, resting O2 sat at 97 %; on 4 L of O2, O2 sat during amb in the 90s %; after back to chair while on 4 L of O2, gradual decline in O2 sat to 90 -->88 % noted; pt was placed back on 5 L of O2 and O2 sat = 93 %; RN informed  Activity Tolerance   Activity Tolerance Patient limited by fatigue;Treatment limited secondary to medical complications (Comment)   Nurse Made Aware spoke to SCHUYLER Ramirez   Assessment   Prognosis Good   Problem List Decreased strength;Decreased endurance; Impaired balance;Decreased mobility; Decreased cognition;Decreased safety awareness   Assessment Pt is 82 y o  male admitted with hx Dx of Thoracic aortic aneurysm without rupture and underwent Endovascular thoracic aortic aneurysm repair on 12/27/2018; postop course included: lumbar drain in place; acute respiratory failure requiring high flow via nasal cannula  Pt 's comorbidities affecting POC include: COPD, DVT, HTN, PVD, and CAD and personal factors of: advanced age, SHYANNE, steps in the house, and primarily Australian speaking   Pt's clinical presentation is currently  unstable/unpredictable which is evident in ongoing suppl O2 needs at 5 L/min, telem monitoring, need for input for task focus and mobility technique and need for assist w/ all phases of observed mobility incl amb w/ rw when usually mobilizing independently  Pt presents w/ generalized weakness, incl decreased LE strength, decreased functional endurance and activity tolerance w/ decreased O2 sat post mobilization on 4 L of O2, impaired balance w/ associated gait deviations requiring use of rw at this time and fall risk  Will cont to follow pt in PT for progressive mobilization to address above functional deficits and to max level of (I), endurance, and safety  Currently recommend rehab upon D/C when medically cleared as pt's overall functional mobility status appears to be below premorbid level  Will cont to follow until then  Barriers to Discharge Inaccessible home environment  (steps in the house)   Goals   Patient Goals pt did not express   STG Expiration Date 01/08/19   Short Term Goal #1 7-10 days  Pt will amb 2 x 150 ft w/ least restrictive assistive device PRN, mod (I) in order to facilitate safe return to premorbid environment and to initiate return to community amb status  Pt will negotiate 5 steps w/ hand rail and SPC PRN, (S)x1 in order to navigate in and out of home environment safely  Pt will achieve (I) level w/ bed mob in order to facilitate safety with OOB and back to bed transitions in own living environment  Pt will perform transfers w/ mod (I) to assure (I) and safety w/ functional mobility/transitions w/ all aspects of mobility/locomotion  Pt will participate in LE therex and balance activities to max progression w/ mobility skills  Treatment Day 0   Plan   Treatment/Interventions Functional transfer training;LE strengthening/ROM; Elevations; Therapeutic exercise; Endurance training;Cognitive reorientation;Patient/family training;Equipment eval/education; Bed mobility;Gait training;Spoke to nursing;Spoke to case management   PT Frequency Other (Comment)  (4-6x/wk)   Recommendation   Recommendation Post acute IP rehab Equipment Recommended Walker  (w/ (A))   Modified Kalani Scale   Modified [place] Scale 4   Barthel Index   Feeding 10   Bathing 0   Grooming Score 5   Dressing Score 5   Bladder Score 10   Bowels Score 10   Toilet Use Score 5   Transfers (Bed/Chair) Score 5   Mobility (Level Surface) Score 0   Stairs Score 0   Barthel Index Score 50       Lilli Salinas, PT

## 2018-12-29 NOTE — SOCIAL WORK
81yo male is s/p HARINI  He has chronic COPD and is on home O2  Pt  Awake but very sleepy, speaks [address]  Wife is here, states they live in 2 story home in [facility] with 7 SHYANNE and bathroom each floor  She states they may move bed downstairs  Only DME is O2  Pt given RW as ordered from [address]  He has no hx of HHC or rehab  Wife chose [facility] for nsg, PT/OT and referral sent  PT recommending inpt rehab but both pt and wife refusing  They would like their discharge meds filled at [facility], usually use mail order   is son Karey Bertrand at [phone] and daughter Sergio Damico at [phone]  Family will transport home at discharge  CM reviewed d/c planning process including the following: identifying help at home, patient preference for d/c planning needs, Discharge Lounge, [facility] Meds to Bed program, availability of treatment team to discuss questions or concerns patient and/or family may have regarding understanding medications and recognizing signs and symptoms once discharged  CM also encouraged patient to follow up with all recommended appointments after discharge  Patient advised of importance for patient and family to participate in managing patients medical well being  Patient/caregiver received discharge checklist  Content reviewed  Patient/caregiver encouraged to participate in discharge plan of care prior to discharge home

## 2018-12-29 NOTE — PROGRESS NOTES
Progress Note - Cardiothoracic Surgery   [facility] ([place], [place]) 82 y o  male MRN: [id number]  Unit/Bed#: [facility] 422-01 Encounter: [id number]  Descending thoracic aortic aneurysm  S/P thoracic endovascular aortic repair; POD # 2    24 Hour Events: Transferred to floor  Spinal drain removed w/o incidence  No other events  No complaints, wife translates  Tolerating diet  (+) flatus, (-) BM  Not ambulating much  Pain controlled      Medications:   Scheduled Meds:  Current Facility-Administered Medications:  acetaminophen 650 mg Oral Q4H PRN Mynor Major PA-C   albuterol 2 5 mg Nebulization Q4H PRN Adrianchadd Coronel MD   amLODIPine 5 mg Oral Daily Yayo Metz PA-C   atorvastatin 10 mg Oral Daily With Dinner Mynor Major PA-C   bisacodyl 10 mg Rectal Daily PRN Mynor Major PA-C   budesonide-formoterol 2 puff Inhalation BID Mynor Major PA-C   docusate sodium 100 mg Oral BID Yayo Metz PA-C   furosemide 20 mg Oral Daily Gina TRI Metz PA-C   insulin lispro 1-5 Units Subcutaneous TID AC Lisa Hughes PA-C   insulin lispro 1-5 Units Subcutaneous HS Mynor Major PA-C   ipratropium 0 5 mg Nebulization TID Adrian Coronel MD   levalbuterol 1 25 mg Nebulization TID Adrianchadd Coronel MD   losartan 100 mg Oral Daily Yayo Metz PA-C   metoprolol tartrate 50 mg Oral Q12H [facility] Mynor Major PA-C   mupirocin 1 application Nasal Y45R [facility] Mynor Major PA-C   ondansetron 4 mg Intravenous Q6H PRN Mynor Major PA-C   pantoprazole 40 mg Oral Early Morning Mynor Major PA-C   polyethylene glycol 17 g Oral Daily Mynor Major PA-C   tiotropium 18 mcg Inhalation Daily Mynor Major PA-C     Continuous Infusions:   PRN Meds:   acetaminophen    albuterol    bisacodyl    ondansetron    Vitals:   Vitals:    12/29/18 0309 12/29/18 0600 12/29/18 0709 12/29/18 0718   BP: 145/69   152/70   BP Location: Right arm   Right arm   Pulse: 71   68   Resp: 18   20   Temp: 98 5 °F (36 9 °C) 99 1 °F (37 3 °C)   TempSrc: Oral   Axillary   SpO2: 97%  96% 97%   Weight:  71 7 kg (158 lb 1 1 oz)     Height:         Bp x24hrs: 110-150/50-60    Telemetry: NSR; Heart Rate: 68    Respiratory:   SpO2: SpO2: 97 %, SpO2 Activity: SpO2 Activity: At Rest, SpO2 Device: O2 Device: Other (comment) (High flow); High Flow 40% 50L    Intake/Output:   I/O       12/27 0701 - 12/28 0700 12/28 0701 - 12/29 0700 12/29 0701 - 12/30 0700    P  O   0     I V  (mL/kg) 2186 8 (30) 70 (1)     IV Piggyback 50      Total Intake(mL/kg) 2236 8 (30 6) 70 (1)     Urine (mL/kg/hr) 1665 (1) 2550 (1 5)     Drains 130 0     Other 34      Blood 50      Total Output 1879 2550      Net +357 8 -2480             Unmeasured Urine Occurrence  1 x         UOP - 200c/c/8hr; 2550cc/24hrs w/ 1 unmeasured occurance    Weights:   Weight (last 2 days)     Date/Time   Weight    12/29/18 0600  71 7 (158 07)    12/27/18 0644  73 (161)            Admit weight: 73kg - down 1 5kg from admission weight    Results:     Results from last 7 days  Lab Units 12/29/18  0506 12/28/18  0435 12/27/18  1158   WBC Thousand/uL 11 21* 9 23  --    HEMOGLOBIN g/dL 13 7 13 8  --    I STAT HEMOGLOBIN g/dl  --   --  14 6   HEMATOCRIT % 45 9 45 6  --    HEMATOCRIT, ISTAT %  --   --  43   PLATELETS Thousands/uL 125* 132*  --        Results from last 7 days  Lab Units 12/29/18  0506 12/28/18  0435 12/27/18  1258 12/27/18  1158   SODIUM mmol/L 139 140 141  --    POTASSIUM mmol/L 4 2 4 4 4 0  --    CHLORIDE mmol/L 98* 101 104  --    CO2 mmol/L 39* 37* 32  --    CO2, I-STAT mmol/L  --   --   --  36*   BUN mg/dL 19 16 12  --    CREATININE mg/dL 0 86 0 91 0 92  --    GLUCOSE, ISTAT mg/dl  --   --   --  136   CALCIUM mg/dL 8 5 7 9* 8 0*  --        Results from last 7 days  Lab Units 12/24/18  1143   INR  1 04   PTT seconds 30     Point of care glucose: 143 - 138 - 115 - 113    Studies:  CXR 12/29: b/l pleural effusions & atelectasis stable from prior study, lines in place, stable pulm vasc congestion    Invasive Lines/Tubes:  Invasive Devices     Central Venous Catheter Line            CVC Central Lines 12/27/18 1 day          Peripheral Intravenous Line            Peripheral IV 12/27/18 Left Arm 1 day                Physical Exam:    HEENT/NECK:  PERRLA  No jugular venous distention  Cardiac: Regular rate and rhythm  No rubs/murmurs/gallops  Pulmonary:  Breath sounds slightly diminished at the bases bilaterally  Abdomen:  Non-tender, Non-distended  Positive bowel sounds  Incisions: Groins soft w/o hematoma  Spinal drain insertion site C/D/I & NTTP  Lower extremities: Extremities warm/dry  Radial/PT/DP pulses 2+ bilaterally  Trace edema B/L  Neuro: Alert and oriented X 3  Sensation is grossly intact  No focal deficits  Skin: Warm/Dry, without rashes or lesions  Assessment:  Patient Active Problem List   Diagnosis    Hypertension    COPD [facility]chronic obstructive pulmonary disease) [facility] [address]    Descending aortic aneurysm (HCC)    History of DVT (deep vein thrombosis)    Benign essential hypertension    Thoracic aortic aneurysm (HCC)    Chronic respiratory failure with hypoxia (HCC)    Varicose veins of both lower extremities with pain    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HCC)    Edema of both legs    Snoring    Thoracic aortic aneurysm without rupture ([facility] [address] )    Aneurysm, aorta, thoracic ([facility] [address] )    Acute on chronic respiratory failure with hypoxia and hypercapnia (HCC)       Descending thoracic aortic aneurysm  S/P thoracic endovascular aortic repair; POD # 2    Plan:    1  Cardiac:   NSR; Hypertensive  Lopressor 50 mg PO BID    Norvasc 5mg PO QDay - increase to 10mg    Losartan 100mg PO QDay  Continue ASA and Statin therapy  Maintain central IV access today for blood draws  Continue DVT prophylaxis    2  Pulmonary:   Acute respiratory failure; Requiring high flow via nasal cannula    Secondary to COPD, atelectasis, pulmonary vascular congestion and h/o of chronic O2 use at home DR GERALD OSBORNE [facility])  Continue incentive spirometry/coughing/deep breathing exercises  Wean supplemental oxygen as tolerated for saturation > 90%  Continue Symbicort  Continue Atrovent   Continue Xoponex   Continue Spiriva    3  Renal:   Intake/Output net: (-)2480 mL/24 hours  Continue diuresis   Lasix 20 mg IV QD  No K supplementation  Post op Creatinine stable; Follow up labs prn    4  Neuro:  Neurologically intact; No active issues  Incisional pain well-controlled; Continue prn Percocet    5  GI:  Tolerating TLC 2 3 gm sodium diet  Maintain 1800 mL daily fluid restriction   Continue stool softeners and prn suppository  Continue GI prophylaxis    6  Endo:    No history of diabetes; Glucose well-controlled with sliding scale coverage    7  Hematology:   Leukocytosis, afebrile    8   Disposition:  Not yet ambulating independently    VTE Pharmacologic Prophylaxis: Sequential compression device Winfred Holter)   VTE Mechanical Prophylaxis: sequential compression device    Collaborative rounds completed with ROSIE Tristan , and unique RN    SIGNATURE: Neymar Santoyo PA-C  DATE: December 29, 2018  TIME: 7:38 AM

## 2018-12-30 LAB
ANION GAP SERPL CALCULATED.3IONS-SCNC: 5 MMOL/L (ref 4–13)
BUN SERPL-MCNC: 29 MG/DL (ref 5–25)
CALCIUM SERPL-MCNC: 8.3 MG/DL (ref 8.3–10.1)
CHLORIDE SERPL-SCNC: 98 MMOL/L (ref 100–108)
CO2 SERPL-SCNC: 35 MMOL/L (ref 21–32)
CREAT SERPL-MCNC: 1.04 MG/DL (ref 0.6–1.3)
ERYTHROCYTE [DISTWIDTH] IN BLOOD BY AUTOMATED COUNT: 13.3 % (ref 11.6–15.1)
GFR SERPL CREATININE-BSD FRML MDRD: 67 ML/MIN/1.73SQ M
GLUCOSE SERPL-MCNC: 136 MG/DL (ref 65–140)
GLUCOSE SERPL-MCNC: 146 MG/DL (ref 65–140)
GLUCOSE SERPL-MCNC: 153 MG/DL (ref 65–140)
GLUCOSE SERPL-MCNC: 89 MG/DL (ref 65–140)
GLUCOSE SERPL-MCNC: 91 MG/DL (ref 65–140)
HCT VFR BLD AUTO: 45.9 % (ref 36.5–49.3)
HGB BLD-MCNC: 13.8 G/DL (ref 12–17)
MCH RBC QN AUTO: 31.3 PG (ref 26.8–34.3)
MCHC RBC AUTO-ENTMCNC: 30.1 G/DL (ref 31.4–37.4)
MCV RBC AUTO: 104 FL (ref 82–98)
PLATELET # BLD AUTO: 122 THOUSANDS/UL (ref 149–390)
PMV BLD AUTO: 9.5 FL (ref 8.9–12.7)
POTASSIUM SERPL-SCNC: 4.3 MMOL/L (ref 3.5–5.3)
RBC # BLD AUTO: 4.41 MILLION/UL (ref 3.88–5.62)
SODIUM SERPL-SCNC: 138 MMOL/L (ref 136–145)
WBC # BLD AUTO: 9.11 THOUSAND/UL (ref 4.31–10.16)

## 2018-12-30 PROCEDURE — 85027 COMPLETE CBC AUTOMATED: CPT | Performed by: PHYSICIAN ASSISTANT

## 2018-12-30 PROCEDURE — 99024 POSTOP FOLLOW-UP VISIT: CPT | Performed by: THORACIC SURGERY (CARDIOTHORACIC VASCULAR SURGERY)

## 2018-12-30 PROCEDURE — 94760 N-INVAS EAR/PLS OXIMETRY 1: CPT

## 2018-12-30 PROCEDURE — 99222 1ST HOSP IP/OBS MODERATE 55: CPT | Performed by: NURSE PRACTITIONER

## 2018-12-30 PROCEDURE — 94640 AIRWAY INHALATION TREATMENT: CPT

## 2018-12-30 PROCEDURE — 82948 REAGENT STRIP/BLOOD GLUCOSE: CPT

## 2018-12-30 PROCEDURE — 80048 BASIC METABOLIC PNL TOTAL CA: CPT | Performed by: PHYSICIAN ASSISTANT

## 2018-12-30 RX ADMIN — METOPROLOL TARTRATE 50 MG: 50 TABLET, FILM COATED ORAL at 21:25

## 2018-12-30 RX ADMIN — ATORVASTATIN CALCIUM 10 MG: 10 TABLET, FILM COATED ORAL at 18:16

## 2018-12-30 RX ADMIN — DOCUSATE SODIUM 100 MG: 100 CAPSULE, LIQUID FILLED ORAL at 09:48

## 2018-12-30 RX ADMIN — METOPROLOL TARTRATE 50 MG: 50 TABLET, FILM COATED ORAL at 09:49

## 2018-12-30 RX ADMIN — PANTOPRAZOLE SODIUM 40 MG: 40 TABLET, DELAYED RELEASE ORAL at 06:19

## 2018-12-30 RX ADMIN — AMLODIPINE BESYLATE 10 MG: 10 TABLET ORAL at 09:48

## 2018-12-30 RX ADMIN — DOCUSATE SODIUM 100 MG: 100 CAPSULE, LIQUID FILLED ORAL at 18:16

## 2018-12-30 RX ADMIN — ASPIRIN 81 MG: 81 TABLET, COATED ORAL at 09:48

## 2018-12-30 RX ADMIN — LEVALBUTEROL HYDROCHLORIDE 1.25 MG: 1.25 SOLUTION, CONCENTRATE RESPIRATORY (INHALATION) at 13:31

## 2018-12-30 RX ADMIN — POLYETHYLENE GLYCOL 3350 17 G: 17 POWDER, FOR SOLUTION ORAL at 09:48

## 2018-12-30 RX ADMIN — FUROSEMIDE 20 MG: 20 TABLET ORAL at 09:49

## 2018-12-30 RX ADMIN — MUPIROCIN 1 APPLICATION: 20 OINTMENT TOPICAL at 21:25

## 2018-12-30 RX ADMIN — LEVALBUTEROL HYDROCHLORIDE 1.25 MG: 1.25 SOLUTION, CONCENTRATE RESPIRATORY (INHALATION) at 20:39

## 2018-12-30 RX ADMIN — LEVALBUTEROL HYDROCHLORIDE 1.25 MG: 1.25 SOLUTION, CONCENTRATE RESPIRATORY (INHALATION) at 07:10

## 2018-12-30 RX ADMIN — LOSARTAN POTASSIUM 100 MG: 50 TABLET, FILM COATED ORAL at 09:48

## 2018-12-30 RX ADMIN — BUDESONIDE AND FORMOTEROL FUMARATE DIHYDRATE 2 PUFF: 160; 4.5 AEROSOL RESPIRATORY (INHALATION) at 09:48

## 2018-12-30 RX ADMIN — IPRATROPIUM BROMIDE 0.5 MG: 0.5 SOLUTION RESPIRATORY (INHALATION) at 13:31

## 2018-12-30 RX ADMIN — BUDESONIDE AND FORMOTEROL FUMARATE DIHYDRATE 2 PUFF: 160; 4.5 AEROSOL RESPIRATORY (INHALATION) at 18:16

## 2018-12-30 RX ADMIN — IPRATROPIUM BROMIDE 0.5 MG: 0.5 SOLUTION RESPIRATORY (INHALATION) at 20:39

## 2018-12-30 RX ADMIN — IPRATROPIUM BROMIDE 0.5 MG: 0.5 SOLUTION RESPIRATORY (INHALATION) at 07:10

## 2018-12-30 RX ADMIN — MUPIROCIN 1 APPLICATION: 20 OINTMENT TOPICAL at 09:49

## 2018-12-30 RX ADMIN — TIOTROPIUM BROMIDE 18 MCG: 18 CAPSULE ORAL; RESPIRATORY (INHALATION) at 09:48

## 2018-12-30 NOTE — PROGRESS NOTES
Progress Note - Cardiothoracic Surgery   [facility] 82 y o  male MRN: [id number]  Unit/Bed#: [facility] 422-01 Encounter: [id number]  Descending thoracic aortic aneurysm  S/P thoracic endovascular aortic repair; POD # 3      24 Hour Events: Transitioned from HFNC to NC yesterday, on 4LNC this AM  PT recommending rehab, pt & family refusing, for home at discharge  Febrile w/ tmax 101 2 yesterday, given PRN Tylenol w/ improvement  Denies urinary sx or calf tenderness, not using narcotics, not using IS much, not ambulating much  No other events  No complaints  Tolerating diet  (+) flatus/BM  Not ambulating much  Pain controlled      Medications:   Scheduled Meds:    Current Facility-Administered Medications:  acetaminophen 650 mg Oral Q4H PRN Truddie Fabry, PA-C   albuterol 2 5 mg Nebulization Q4H PRN Eual Paci, MD   amLODIPine 10 mg Oral Daily Truddie Fabry, PA-C   aspirin 81 mg Oral Daily Truddie Fabry, PA-C   atorvastatin 10 mg Oral Daily With Dinner Truddie Fabry, PA-C   bisacodyl 10 mg Rectal Daily PRN Truddie Fabry, PA-C   budesonide-formoterol 2 puff Inhalation BID Truddie Fabry, PA-C   docusate sodium 100 mg Oral BID Misael Metz PA-C   furosemide 20 mg Oral Daily Gina TRI Metz PA-C   insulin lispro 1-5 Units Subcutaneous TID AC Lisa Faulkner PA-C   insulin lispro 1-5 Units Subcutaneous HS Truddie Fabry, PA-C   ipratropium 0 5 mg Nebulization TID Eual Paci, MD   levalbuterol 1 25 mg Nebulization TID Eual Paci, MD   losartan 100 mg Oral Daily Misael Metz PA-C   metoprolol tartrate 50 mg Oral Q12H [facility] Truddie Fabry, PA-C   mupirocin 1 application Nasal Z33D [facility] Truddie Fabry, PA-C   ondansetron 4 mg Intravenous Q6H PRN Truddie Fabry, PA-C   pantoprazole 40 mg Oral Early Morning Truddie Fabry, PA-C   polyethylene glycol 17 g Oral Daily Truddie Fabry, PA-C   tiotropium 18 mcg Inhalation Daily Truddie Fabry, PA-C     Continuous Infusions:   PRN Meds:   acetaminophen    albuterol    bisacodyl    ondansetron    Vitals:   Vitals:    12/30/18 0309 12/30/18 0600 12/30/18 0710 12/30/18 0734   BP: 111/62   122/68   BP Location: Left arm   Left arm   Pulse: 73   70   Resp: 20   20   Temp: 97 9 °F (36 6 °C)   100 °F (37 8 °C)   TempSrc: Oral   Oral   SpO2: 91%  95% 92%   Weight:  71 6 kg (157 lb 13 6 oz)     Height:         Bp x24hrs: 100-110/50-60    Telemetry: NSR; Heart Rate: 81    Respiratory:   SpO2: SpO2: 92 %, SpO2 Activity: SpO2 Activity: At Rest, SpO2 Device: O2 Device: Nasal cannula; 4 LPM    Intake/Output:   I/O       12/28 0701 - 12/29 0700 12/29 0701 - 12/30 0700    P  O  0 610    I V  (mL/kg) 70 (1)     Total Intake(mL/kg) 70 (1) 610 (8 5)    Urine (mL/kg/hr) 2550 (1 5) 850 (0 5)    Drains 0     Stool  1    Total Output 2550 851    Net -2480 -241          Unmeasured Urine Occurrence 1 x         UOP - 200cc/8hr; 850cc/24hrs    Weights:   Weight (last 2 days)     Date/Time   Weight    12/30/18 0600  71 6 (157 85)    12/29/18 0600  71 7 (158 07)            Admit weight: 73kg - down 1 5kg from admission weight    Results:     Results from last 7 days  Lab Units 12/30/18  0512 12/29/18  0506 12/28/18  0435   WBC Thousand/uL 9 11 11 21* 9 23   HEMOGLOBIN g/dL 13 8 13 7 13 8   HEMATOCRIT % 45 9 45 9 45 6   PLATELETS Thousands/uL 122* 125* 132*       Results from last 7 days  Lab Units 12/30/18  0512 12/29/18  0506 12/28/18  0435  12/27/18  1158   SODIUM mmol/L 138 139 140  < >  --    POTASSIUM mmol/L 4 3 4 2 4 4  < >  --    CHLORIDE mmol/L 98* 98* 101  < >  --    CO2 mmol/L 35* 39* 37*  < >  --    CO2, I-STAT mmol/L  --   --   --   --  36*   BUN mg/dL 29* 19 16  < >  --    CREATININE mg/dL 1 04 0 86 0 91  < >  --    GLUCOSE, ISTAT mg/dl  --   --   --   --  136   CALCIUM mg/dL 8 3 8 5 7 9*  < >  --    < > = values in this interval not displayed      Results from last 7 days  Lab Units 12/24/18  1143   INR  1 04   PTT seconds 30     Point of care glucose: 89 - 107 - 132 - 122    Studies:  CXR 12/29: final report pending    Invasive Lines/Tubes:  Invasive Devices     Peripheral Intravenous Line            Peripheral IV 12/29/18 Right Arm less than 1 day                Physical Exam:    HEENT/NECK:  PERRLA  No jugular venous distention  Cardiac: Regular rate and rhythm  No rubs/murmurs/gallops  Pulmonary:  Breath sounds slightly diminished at the bases bilaterally  Abdomen:  Non-tender, Non-distended  Positive bowel sounds  Incisions: Groins soft w/o hematoma  Spinal drain insertion site C/D/I & NTTP  Lower extremities: Extremities warm/dry  Radial/PT/DP pulses 2+ bilaterally  Trace edema B/L  (-) Sandro's sign b/l  Neuro: Alert and oriented X 3  Sensation is grossly intact  No focal deficits  Skin: Warm/Dry, without rashes or lesions  Assessment:  Patient Active Problem List   Diagnosis    Hypertension    COPD (chronic obstructive pulmonary disease) ([address] )    Descending aortic aneurysm (HCC)    History of DVT (deep vein thrombosis)    Benign essential hypertension    Thoracic aortic aneurysm (HCC)    Chronic respiratory failure with hypoxia (HCC)    Varicose veins of both lower extremities with pain    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HCC)    Edema of both legs    Snoring    [facility] without rupture [facility] [address]    Aneurysm, aorta, thoracic ([facility] [address] )    Acute on chronic respiratory failure with hypoxia and hypercapnia (HCC)    Leukocytosis    Thrombocytopenia (HCC)    Hypochloremia       Descending thoracic aortic aneurysm  S/P thoracic endovascular aortic repair; POD # 3    Plan:    1  Cardiac:   NSR; HR/BP well-controlled  Lopressor 50 mg PO BID    Norvasc 10mg PO QDay    Losartan 100mg PO QDay  Continue ASA and Statin therapy  Patient no longer has central IV access   Continue DVT prophylaxis    2  Pulmonary:   Acute respiratory failure; Requiring 4 liters via nasal cannla    Secondary to COPD, atelectasis, pulmonary vascular congestion and chronic home O2 use (2LNC)  Continue incentive spirometry/coughing/deep breathing exercises  Wean supplemental oxygen as tolerated for saturation > 90%  Continue Symbicort  Continue Atrovent              Continue Xoponex              Continue Spiriva    3  Renal:   Intake/Output net: (-)241 mL/24 hours  Continue diuresis   Lasix 40 mg IV BID  No K supplementation  Post-op Creat stable    4  Neuro:  Neurologically intact; No active issues  Incisional pain well-controlled;    5  GI:  Tolerating TLC 2 3 gm sodium diet  Maintain 1800 mL daily fluid restriction   Continue stool softeners and prn suppository  Continue GI prophylaxis    6  Endo:    No history of diabetes; Glucose well-controlled with sliding scale coverage    7  Hematology:   Febrile overnight, likely secondary to atelectasis    8   Disposition:  Ambulating independently, Anticipate discharge to home once afebrile [id number]     VTE Pharmacologic Prophylaxis: Sequential compression device (Venodyne)   VTE Mechanical Prophylaxis: sequential compression device    Collaborative rounds completed with ROSIE Webster , and unique RN    SIGNATURE: Kierra Brower PA-C  DATE: December 30, 2018  TIME: 7:48 AM

## 2018-12-30 NOTE — PROGRESS NOTES
Pt's temperature 101 2 F and tachypneic  Other VS stable  PRN tylenol given and Valentina Del Cid PA-C with Critical Care paged  OK with just tylenol for now, no blood cultures or other orders at this time  Will continue to monitor

## 2018-12-30 NOTE — CONSULTS
Pulmonary Consultation   Merlene Lanza 82 y o  male MRN: [id number]  Unit/Bed#: [facility] 422-01 Encounter: [id number]      Reason for consultation: COPD, TAA    Requesting physician: Amanda Delgado    Impressions/Recommendations:  1  Descending TAA s/p TEVAR on 2/27  1  Management per cardiac surgery  2  Abnormal CXR with bilateral pleural effusions  1  CXR reviewed noted blunting of costophrenic angle likely secondary to volume overload would recommend diuresis  3  COPD without acute exacerbation  1  continue Symbicort  2  D/C Spiriva while on atrovent  3  Atrovent/xopenex TID  4  No need for steroids  5  Encourage aggressive pulmonary toilet: IS Q 1hr, increase activity  4  Acute on Chronic hypoxic respiratory failure  1  Wears 2 LNC at baseline, requiring increase in post operative period  2  Continue to wean oxygen to baseline  3  Maintain SpO2 greater than or equal to 88%  4  Pulmonary toilet as indicated above  5  Suspected JAY  1  Has refused evaluation in the past  2  Readdress at follow up          History of Present Illness   HPI:  Merlene Lanza is a 82 y o  male seen in consult for COPD and TAA  He underwent TEVAR on the 27th and is now transferred to the floor  He has a past medical history significant for suspected Chronic obstructive pulmonary disease of unknown severity, chronic hypoxic respiratory failure, chronic diastolic heart failure, suspected obstructive sleep apnea, CAD, PVD, history of DVT, GERD, BPH, and history of a 35 pack year smoking history with quit in 2001  He currently reports feeling close to his baseline, currently on 3 LNc  Reports cough is at baseline, denies sputum production  Mild GONSALVES over baseline  Denies:   Fevers, chills, night sweats, nausea vomiting diarrhea headache, dizziness, bronchospasm hemoptysis  CXR yesterday bilateral pleural effusions    From pulmonary standpoint she follows outpatient with Dr Nathan Cabrera    Maintains a regimen of Spiriva daily and Symbicort b i d  , with albuterol rescue inhaler as needed  At baseline he wears 2-3 L nasal cannula  Denies symptoms of:  Dysphagia, or heartburn  Does have symptoms of obstructive sleep apnea but denies desire for further evaluation  Denies previous intubations solely due to breathing issues  No recent exposures    Review of systems:  12 point review of systems was completed and was otherwise negative except as listed in HPI        Historical Information   Past Medical History:   Diagnosis Date    Appendicolith     Ascending aortic aneurysm (HCC)     3 7    Asthma     BPH (benign prostatic hyperplasia)     CAD (coronary artery disease)     noted on CT scan    COPD (chronic obstructive pulmonary disease) (HCC)     Descending thoracic aortic aneurysm (HCC)     Diverticulosis     Former tobacco use     GERD (gastroesophageal reflux disease)     History of DVT (deep vein thrombosis)     Left leg    History of transfusion     Hypertension     Inguinal hernia     right    Nephrolithiasis     Oxygen dependent     2LNC    Prostate calculus     PVD (peripheral vascular disease) (HCC)     Ulcer     Varicose vein of leg     b/l     Past Surgical History:   Procedure Laterality Date    ESOPHAGOGASTRODUODENOSCOPY      INGUINAL HERNIA REPAIR Bilateral     IR TEVAR  12/27/2018    VA ENDOVASC TAA REINCL SUBCL N/A 12/27/2018    Procedure: TEVAR - endovascular thoracic aortic aneurysm repair;  Surgeon: Rochelle Ortega MD;  [facility]: [facility];  Service: Vascular    VARICOSE VEIN SURGERY Bilateral     vein stripping     Family History   Problem Relation Age of Onset    Tuberculosis Mother     No Known Problems Father     Cancer Sister     Diabetes Family     Hypertension Family        Occupational history: non contributory    Tobacco history: 35 pack year smoking history, quit 20041    Meds/Allergies   Current Facility-Administered Medications   Medication Dose Route Frequency    acetaminophen (TYLENOL) tablet 650 mg  650 mg Oral Q4H PRN    albuterol inhalation solution 2 5 mg  2 5 mg Nebulization Q4H PRN    amLODIPine (NORVASC) tablet 10 mg  10 mg Oral Daily    aspirin (ECOTRIN LOW STRENGTH) EC tablet 81 mg  81 mg Oral Daily    atorvastatin (LIPITOR) tablet 10 mg  10 mg Oral Daily With Dinner    bisacodyl (DULCOLAX) rectal suppository 10 mg  10 mg Rectal Daily PRN    budesonide-formoterol (SYMBICORT) 160-4 5 mcg/act inhaler 2 puff  2 puff Inhalation BID    docusate sodium (COLACE) capsule 100 mg  100 mg Oral BID    furosemide (LASIX) tablet 20 mg  20 mg Oral Daily    insulin lispro (HumaLOG) 100 units/mL subcutaneous injection 1-5 Units  1-5 Units Subcutaneous TID AC    insulin lispro (HumaLOG) 100 units/mL subcutaneous injection 1-5 Units  1-5 Units Subcutaneous HS    ipratropium (ATROVENT) 0 02 % inhalation solution 0 5 mg  0 5 mg Nebulization TID    levalbuterol (XOPENEX) inhalation solution 1 25 mg  1 25 mg Nebulization TID    losartan (COZAAR) tablet 100 mg  100 mg Oral Daily    metoprolol tartrate (LOPRESSOR) tablet 50 mg  50 mg Oral Q12H KIKE    mupirocin (BACTROBAN) 2 % nasal ointment 1 application  1 application Nasal [address]    ondansetron (ZOFRAN) injection 4 mg  4 mg Intravenous Q6H PRN    pantoprazole (PROTONIX) EC tablet 40 mg  40 mg Oral Early Morning    polyethylene glycol (MIRALAX) packet 17 g  17 g Oral Daily    tiotropium (SPIRIVA) capsule for inhaler 18 mcg  18 mcg Inhalation Daily     Prescriptions Prior to Admission   Medication    acetaminophen (TYLENOL) 325 mg tablet    albuterol (2 5 mg/3 mL) 0 083 % nebulizer solution    albuterol (VENTOLIN HFA) 90 mcg/act inhaler    amLODIPine (NORVASC) 5 mg tablet    aspirin (ECOTRIN LOW STRENGTH) 81 mg EC tablet    atorvastatin (LIPITOR) 10 mg tablet    budesonide-formoterol (SYMBICORT) 160-4 5 mcg/act inhaler    furosemide (LASIX) 20 mg tablet    losartan (COZAAR) 100 MG tablet    mupirocin (BACTROBAN) 2 % nasal ointment    mupirocin (BACTROBAN) 2 % nasal ointment    pantoprazole (PROTONIX) 40 mg tablet    tiotropium (SPIRIVA) 18 mcg inhalation capsule    metoprolol tartrate (LOPRESSOR) 50 mg tablet     Allergies   Allergen Reactions    Penicillins Hives, Itching and Rash    Lisinopril Rash     Side pains and rash        Vitals: Blood pressure 99/57, pulse 72, temperature 98 2 °F (36 8 °C), temperature source Axillary, resp  rate 16, height 5' 1" (1 549 m), weight 71 6 kg (157 lb 13 6 oz), SpO2 92 %  , 3LNC, Body mass index is 29 83 kg/m²  Intake/Output Summary (Last 24 hours) at 12/30/18 1650  Last data filed at 12/30/18 1415   Gross per 24 hour   Intake             1210 ml   Output              500 ml   Net              710 ml       Physical exam:    General Appearance:    Alert, cooperative, no conversational dyspnea or accessory     muscle use-family at bedside to help with interpretation        Head/eyes:    Normocephalic, without obvious abnormality, atraumatic,         PERRL, extraocular muscles intact, no scleral icterus    Nose:   Nares normal, septum midline, mucosa normal, no drainage    or sinus tenderness   Throat:   Moist mucous membranes, no thrush   Neck:   Supple, trachea midline, no adenopathy; no carotid    bruit or JVD   Lungs:     Decrease at bases, no wheezes rhonchi or rales   Chest Wall:    No tenderness or deformity    Heart:    Regular rate and rhythm, S1 and S2 normal, no murmur, rub   or gallop   Abdomen:     Soft, non-tender, bowel sounds active all four quadrants,     no masses, no organomegaly   Extremities:   Extremities normal, atraumatic, no cyanosis or edema   Skin:   Warm, dry, turgor normal, no rashes or lesions   Lymph nodes:   Cervical and supraclavicular nodes normal   Neurologic:   CNII-XII intact, normal strength, non-focal         Labs: I have personally reviewed pertinent lab results  , CBC:   Lab Results   Component Value Date    WBC 9 11 12/30/2018    HGB 13 8 12/30/2018    HCT 45 9 12/30/2018     (H) 12/30/2018     (L) 12/30/2018    MCH 31 3 12/30/2018    MCHC 30 1 (L) 12/30/2018    RDW 13 3 12/30/2018    MPV 9 5 12/30/2018   , CMP:   Lab Results   Component Value Date    SODIUM 138 12/30/2018    K 4 3 12/30/2018    CL 98 (L) 12/30/2018    CO2 35 (H) 12/30/2018    BUN 29 (H) 12/30/2018    CREATININE 1 04 12/30/2018    CALCIUM 8 3 12/30/2018    EGFR 67 12/30/2018       Imaging and other studies: I have personally reviewed pertinent reports  and I have personally reviewed pertinent films in PACS  CXR 12/29/2018  Bilateral pleural effusions  Pulmonary function testing:   None    EKG, Pathology, and Other Studies: I have personally reviewed pertinent reports     and EKG 12/30/2018: NSR    Code Status: Level 1 - Full Code      RAQUEL Titus

## 2018-12-30 NOTE — PLAN OF CARE
CARDIOVASCULAR - ADULT     Maintains optimal cardiac output and hemodynamic stability Progressing     Absence of cardiac dysrhythmias or at baseline rhythm Progressing        DISCHARGE PLANNING     Discharge to home or other facility with appropriate resources Progressing        DISCHARGE PLANNING - CARE MANAGEMENT     Discharge to post-acute care or home with appropriate resources Progressing        HEMATOLOGIC - ADULT     Maintains hematologic stability Progressing        INFECTION - ADULT     Absence or prevention of progression during hospitalization Progressing        NEUROSENSORY - ADULT     Achieves stable or improved neurological status Progressing        PAIN - ADULT     Verbalizes/displays adequate comfort level or baseline comfort level Progressing        Potential for Falls     Patient will remain free of falls Progressing        Prexisting or High Potential for Compromised Skin Integrity     Skin integrity is maintained or improved Progressing        RESPIRATORY - ADULT     Achieves optimal ventilation and oxygenation Progressing        SAFETY ADULT     Patient will remain free of falls Progressing     Maintain or return to baseline ADL function Progressing

## 2018-12-31 VITALS
OXYGEN SATURATION: 95 % | BODY MASS INDEX: 29.92 KG/M2 | HEART RATE: 63 BPM | RESPIRATION RATE: 18 BRPM | WEIGHT: 158.5 LBS | DIASTOLIC BLOOD PRESSURE: 55 MMHG | SYSTOLIC BLOOD PRESSURE: 107 MMHG | HEIGHT: 61 IN | TEMPERATURE: 98 F

## 2018-12-31 LAB
ANION GAP SERPL CALCULATED.3IONS-SCNC: 4 MMOL/L (ref 4–13)
BUN SERPL-MCNC: 29 MG/DL (ref 5–25)
CALCIUM SERPL-MCNC: 8.3 MG/DL (ref 8.3–10.1)
CHLORIDE SERPL-SCNC: 98 MMOL/L (ref 100–108)
CO2 SERPL-SCNC: 37 MMOL/L (ref 21–32)
CREAT SERPL-MCNC: 1.02 MG/DL (ref 0.6–1.3)
ERYTHROCYTE [DISTWIDTH] IN BLOOD BY AUTOMATED COUNT: 13.3 % (ref 11.6–15.1)
GFR SERPL CREATININE-BSD FRML MDRD: 69 ML/MIN/1.73SQ M
GLUCOSE SERPL-MCNC: 113 MG/DL (ref 65–140)
GLUCOSE SERPL-MCNC: 119 MG/DL (ref 65–140)
GLUCOSE SERPL-MCNC: 179 MG/DL (ref 65–140)
HCT VFR BLD AUTO: 44.3 % (ref 36.5–49.3)
HGB BLD-MCNC: 13.5 G/DL (ref 12–17)
MCH RBC QN AUTO: 31.3 PG (ref 26.8–34.3)
MCHC RBC AUTO-ENTMCNC: 30.5 G/DL (ref 31.4–37.4)
MCV RBC AUTO: 103 FL (ref 82–98)
PLATELET # BLD AUTO: 123 THOUSANDS/UL (ref 149–390)
PMV BLD AUTO: 9.5 FL (ref 8.9–12.7)
POTASSIUM SERPL-SCNC: 3.9 MMOL/L (ref 3.5–5.3)
RBC # BLD AUTO: 4.31 MILLION/UL (ref 3.88–5.62)
SODIUM SERPL-SCNC: 139 MMOL/L (ref 136–145)
WBC # BLD AUTO: 8.55 THOUSAND/UL (ref 4.31–10.16)

## 2018-12-31 PROCEDURE — 99024 POSTOP FOLLOW-UP VISIT: CPT | Performed by: THORACIC SURGERY (CARDIOTHORACIC VASCULAR SURGERY)

## 2018-12-31 PROCEDURE — 80048 BASIC METABOLIC PNL TOTAL CA: CPT | Performed by: PHYSICIAN ASSISTANT

## 2018-12-31 PROCEDURE — 94640 AIRWAY INHALATION TREATMENT: CPT

## 2018-12-31 PROCEDURE — 94760 N-INVAS EAR/PLS OXIMETRY 1: CPT

## 2018-12-31 PROCEDURE — 82948 REAGENT STRIP/BLOOD GLUCOSE: CPT

## 2018-12-31 PROCEDURE — 85027 COMPLETE CBC AUTOMATED: CPT | Performed by: PHYSICIAN ASSISTANT

## 2018-12-31 RX ORDER — METOPROLOL TARTRATE 50 MG/1
50 TABLET, FILM COATED ORAL EVERY 12 HOURS SCHEDULED
Qty: 30 TABLET | Refills: 2 | Status: ON HOLD | OUTPATIENT
Start: 2018-12-31 | End: 2019-03-22 | Stop reason: SDUPTHER

## 2018-12-31 RX ORDER — FUROSEMIDE 20 MG/1
20 TABLET ORAL DAILY
Qty: 30 TABLET | Refills: 2 | Status: SHIPPED | OUTPATIENT
Start: 2019-01-01 | End: 2019-02-14

## 2018-12-31 RX ORDER — POTASSIUM CHLORIDE 20 MEQ/1
20 TABLET, EXTENDED RELEASE ORAL ONCE
Status: COMPLETED | OUTPATIENT
Start: 2018-12-31 | End: 2018-12-31

## 2018-12-31 RX ORDER — ACETAMINOPHEN 325 MG/1
650 TABLET ORAL EVERY 6 HOURS PRN
Qty: 30 TABLET | Refills: 0 | Status: SHIPPED | OUTPATIENT
Start: 2018-12-31

## 2018-12-31 RX ORDER — AMLODIPINE BESYLATE 10 MG/1
10 TABLET ORAL DAILY
Qty: 30 TABLET | Refills: 2 | Status: SHIPPED | OUTPATIENT
Start: 2019-01-01 | End: 2019-01-23 | Stop reason: HOSPADM

## 2018-12-31 RX ORDER — ASPIRIN 81 MG/1
81 TABLET ORAL DAILY
Qty: 30 TABLET | Refills: 2 | Status: ON HOLD | OUTPATIENT
Start: 2019-01-01 | End: 2020-06-16 | Stop reason: SDUPTHER

## 2018-12-31 RX ORDER — LOSARTAN POTASSIUM 100 MG/1
100 TABLET ORAL DAILY
Qty: 30 TABLET | Refills: 2 | Status: ON HOLD | OUTPATIENT
Start: 2019-01-01 | End: 2019-01-23

## 2018-12-31 RX ORDER — DOCUSATE SODIUM 100 MG/1
100 CAPSULE, LIQUID FILLED ORAL 2 TIMES DAILY
Qty: 10 CAPSULE | Refills: 0 | Status: SHIPPED | OUTPATIENT
Start: 2018-12-31 | End: 2019-02-14

## 2018-12-31 RX ORDER — POLYETHYLENE GLYCOL 3350 17 G/17G
17 POWDER, FOR SOLUTION ORAL DAILY
Qty: 14 EACH | Refills: 0 | Status: SHIPPED | OUTPATIENT
Start: 2019-01-01 | End: 2019-02-14

## 2018-12-31 RX ADMIN — ASPIRIN 81 MG: 81 TABLET, COATED ORAL at 09:10

## 2018-12-31 RX ADMIN — FUROSEMIDE 20 MG: 20 TABLET ORAL at 09:10

## 2018-12-31 RX ADMIN — POTASSIUM CHLORIDE 20 MEQ: 1500 TABLET, EXTENDED RELEASE ORAL at 09:10

## 2018-12-31 RX ADMIN — BUDESONIDE AND FORMOTEROL FUMARATE DIHYDRATE 2 PUFF: 160; 4.5 AEROSOL RESPIRATORY (INHALATION) at 09:13

## 2018-12-31 RX ADMIN — IPRATROPIUM BROMIDE 0.5 MG: 0.5 SOLUTION RESPIRATORY (INHALATION) at 08:00

## 2018-12-31 RX ADMIN — LEVALBUTEROL HYDROCHLORIDE 1.25 MG: 1.25 SOLUTION, CONCENTRATE RESPIRATORY (INHALATION) at 08:00

## 2018-12-31 RX ADMIN — POLYETHYLENE GLYCOL 3350 17 G: 17 POWDER, FOR SOLUTION ORAL at 09:11

## 2018-12-31 RX ADMIN — LEVALBUTEROL HYDROCHLORIDE 1.25 MG: 1.25 SOLUTION, CONCENTRATE RESPIRATORY (INHALATION) at 13:41

## 2018-12-31 RX ADMIN — PANTOPRAZOLE SODIUM 40 MG: 40 TABLET, DELAYED RELEASE ORAL at 06:30

## 2018-12-31 RX ADMIN — DOCUSATE SODIUM 100 MG: 100 CAPSULE, LIQUID FILLED ORAL at 09:11

## 2018-12-31 RX ADMIN — METOPROLOL TARTRATE 50 MG: 50 TABLET, FILM COATED ORAL at 09:09

## 2018-12-31 RX ADMIN — INSULIN LISPRO 1 UNITS: 100 INJECTION, SOLUTION INTRAVENOUS; SUBCUTANEOUS at 12:43

## 2018-12-31 RX ADMIN — IPRATROPIUM BROMIDE 0.5 MG: 0.5 SOLUTION RESPIRATORY (INHALATION) at 13:41

## 2018-12-31 RX ADMIN — MUPIROCIN 1 APPLICATION: 20 OINTMENT TOPICAL at 09:10

## 2018-12-31 RX ADMIN — LOSARTAN POTASSIUM 100 MG: 50 TABLET, FILM COATED ORAL at 09:10

## 2018-12-31 RX ADMIN — AMLODIPINE BESYLATE 10 MG: 10 TABLET ORAL at 09:10

## 2018-12-31 NOTE — PROGRESS NOTES
Progress Note - Cardiothoracic Surgery   [facility] 82 y o  male MRN: [id number]  Unit/Bed#: [facility] 422-01 Encounter: [id number]  Descending thoracic aortic aneurysm  S/P thoracic endovascular aortic repair; POD # 4      24 Hour Events: Tmax 100 2 last night  Patient's son present at bedside requesting patient be discharged to rehab  Patient is resistant to inpatient rehab, but son wants to talk about options  No events or complaints      Medications:   Scheduled Meds:  Current Facility-Administered Medications:  acetaminophen 650 mg Oral Q4H PRN Dano Barillas PA-C   albuterol 2 5 mg Nebulization Q4H PRN Yumi Cline MD   amLODIPine 10 mg Oral Daily Dano Barillas PA-C   aspirin 81 mg Oral Daily Dano Barillas PA-C   atorvastatin 10 mg Oral Daily With Dinner Dano Barillas PA-C   bisacodyl 10 mg Rectal Daily PRN Dano Barillas PA-C   budesonide-formoterol 2 puff Inhalation BID Dano Barillas PA-C   docusate sodium 100 mg Oral BID Suzette Metz PA-C   furosemide 20 mg Oral Daily Gina TRI Metz PA-C   insulin lispro 1-5 Units Subcutaneous TID AC Lisa Saunders PA-C   insulin lispro 1-5 Units Subcutaneous HS Dano Barillas PA-C   ipratropium 0 5 mg Nebulization TID Yumi Cline MD   levalbuterol 1 25 mg Nebulization TID Yumi Cline MD   losartan 100 mg Oral Daily Suzette Metz PA-C   metoprolol tartrate 50 mg Oral Q12H [address] Dano Barillas PA-C   mupirocin 1 application Nasal Q51Z [address] Dano Barillas PA-C   ondansetron 4 mg Intravenous Q6H PRN Dano Barillas PA-C   pantoprazole 40 mg Oral Early Morning Dano Barillas PA-C   polyethylene glycol 17 g Oral Daily Dano Barillas PA-C   tiotropium 18 mcg Inhalation Daily Dano Barillas PA-C     Continuous Infusions:   PRN Meds:   acetaminophen    albuterol    bisacodyl    ondansetron    Vitals: BP /50-81  Vitals:    12/30/18 2125 12/30/18 2338 12/31/18 0258 12/31/18 0722   BP: 114/59 134/62 93/50 115/81 BP Location:  Left arm Left arm Left arm   Pulse: 80 84 65 74   Resp:  16 16 18   Temp:  98 2 °F (36 8 °C) 98 3 °F (36 8 °C) 99 °F (37 2 °C)   TempSrc:  Oral Oral Oral   SpO2:  90% 92% 91%   Weight:       Height:           Telemetry: NSR; Heart Rate: 77    Respiratory:   SpO2: SpO2: 92 %; 3 LPM    Intake/Output:     Intake/Output Summary (Last 24 hours) at 12/31/18 0725  Last data filed at 12/31/18 2482   Gross per 24 hour   Intake              960 ml   Output              575 ml   Net              385 ml   UOP: 450cc/12hrs, 575cc/24hrs      Weights:   Weight (last 2 days)     Date/Time   Weight    12/30/18 0600  71 6 (157 85)    12/29/18 0600  71 7 (158 07)            Admit weight: 73    Results:     Results from last 7 days  Lab Units 12/31/18  0621 12/30/18  0512 12/29/18  0506   WBC Thousand/uL 8 55 9 11 11 21*   HEMOGLOBIN g/dL 13 5 13 8 13 7   HEMATOCRIT % 44 3 45 9 45 9   PLATELETS Thousands/uL 123* 122* 125*       Results from last 7 days  Lab Units 12/31/18  0621 12/30/18  0512 12/29/18  0506  12/27/18  1158   SODIUM mmol/L 139 138 139  < >  --    POTASSIUM mmol/L 3 9 4 3 4 2  < >  --    CHLORIDE mmol/L 98* 98* 98*  < >  --    CO2 mmol/L 37* 35* 39*  < >  --    CO2, I-STAT mmol/L  --   --   --   --  36*   BUN mg/dL 29* 29* 19  < >  --    CREATININE mg/dL 1 02 1 04 0 86  < >  --    GLUCOSE, ISTAT mg/dl  --   --   --   --  136   CALCIUM mg/dL 8 3 8 3 8 5  < >  --    < > = values in this interval not displayed  Results from last 7 days  Lab Units 12/24/18  1143   INR  1 04   PTT seconds 30     Point of care glucose:       Studies:  CXR 12/29: stable bibasilar patchy opacity with small effusions    Invasive Lines/Tubes:  Invasive Devices     Peripheral Intravenous Line            Peripheral IV 12/29/18 Right Arm 1 day                Physical Exam:    HEENT/NECK:  PERRLA  No jugular venous distention  Cardiac: Regular rate and rhythm  No rubs/murmurs/gallops    Pulmonary:  Breath sounds slightly diminished at the bases bilaterally  Abdomen:  Non-tender, Non-distended  Positive bowel sounds  Incisions: Groins soft without hematoma  Spinal drain insertion site C/D/I, & NTTP  Lower extremities: Extremities warm/dry  Radial/PT/DP pulses 2+ bilaterally  Trace edema B/L  Neuro: Alert and oriented X 3  Sensation is grossly intact  No focal deficits  Skin: Warm/Dry, without rashes or lesions  Assessment:  Patient Active Problem List   Diagnosis    Hypertension    COPD (chronic obstructive pulmonary disease) ([facility] )    Descending aortic aneurysm (HCC)    History of DVT (deep vein thrombosis)    Benign essential hypertension    Thoracic aortic aneurysm (HCC)    Chronic respiratory failure with hypoxia (HCC)    Varicose veins of both lower extremities with pain    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HCC)    Edema of both legs    Snoring    [facility]    Aneurysm, aorta, thoracic ([facility] )    Acute on chronic respiratory failure with hypoxia and hypercapnia (HCC)    Leukocytosis    Thrombocytopenia (HCC)    Hypochloremia       Descending thoracic aortic aneurysm  S/P thoracic endovascular aortic repair; POD # 4    Plan:    1  Cardiac:   NSR; HR/BP well-controlled  Lopressor 50 mg PO QD    Norvasc 10 mg PO QD    Losartan 100 mg PO QD  Continue ASA and Statin therapy  Patient no longer has central IV access   Continue DVT prophylaxis    2  Pulmonary:   Acute post-op pulmonary insufficiency; Requiring 3 liters via nasal cannula, secondary to COPD, atelectasis, pulmonary vascular congestion and chronic home O2 use  Continue incentive spirometry/coughing/deep breathing exercises  Wean supplemental oxygen as tolerated for saturation > 90%  Chest tubes have been discontinued    Seen by pulmonology - discontinue Spiriva while on Atrovent, encourage IS use and ambulation   Continue Symbicort, Atrovent, Xopenex    3   Renal:   Intake/Output net: +485 mL/24 hours  Continue diuresis   Home dose, Lasix 20 mg PO QD  K 3 9 - ordered potassium repletion  Post op Creatinine stable; Follow up labs prn    4  Neuro:  Neurologically intact; No active issues  Incisional pain well-controlled; Continue prn Percocet    5  GI:  Tolerating clear liquid diet, without evidence of dysphagia; Initiate TLC 2 3 gm sodium diet with consistent carbohydrate modifier  Maintain 1800 mL daily fluid restriction   Continue stool softeners and prn suppository  Continue GI prophylaxis    6  Endo:    No history of diabetes; Glucose well-controlled with sliding scale coverage    7  Hematology:   Post-operative blood count acceptable; Trend prn    Febrile last night, likely secondary to atelectasis    Tmax 100 2 - continue PRN Tylenol, encourage ambulation and IS use    8  Disposition:  Ambulating with assistance  PT recommending acute rehab  Patient is willing to discuss rehab options      VTE Pharmacologic Prophylaxis: Sequential compression device (Venodyne)   VTE Mechanical Prophylaxis: sequential compression device    Collaborative rounds completed with EVELYN Blake , and Kalyani Mahan RN    SIGNATURE: Eugene Burton PA-C  DATE: December 31, 2018  TIME: 7:22 AM

## 2018-12-31 NOTE — DISCHARGE INSTRUCTIONS
DISCHARGE INSTRUCTIONS  ENDOVASCULAR ANEURYSM REPAIR    Following discharge from the hospital, you may have some questions about your operation, your activities or your general condition  These instructions may answer some of your questions and help you adjust during the first few weeks following your operation  ACTIVITY:  Limit your physical activity to slow walking  Avoid climbing or heavy lifting for the first two weeks after surgery  Walking up steps and normal activities may be resumed, as you feel ready  You should not drive a car for three to four days following discharge from the hospital   You may ride in a car upon discharge  DIET:  Resume your normal diet  Try to eat low fat and low cholesterol foods  INCISION:  You may include the operated area in a shower on the second day following surgery  Sitting in a tub is not recommended for the first week following surgery or if you have any open wounds  Your physician may have chosen to use a type of adhesive glue, to close your incision  There are stitches present under the skin which will absorb on their own  The glue is used to cover the incision, assist in closure, and prevent contamination  This adhesive will darken and peel away on its own within one to two weeks  If one is present, you may remove the dressing, band-aid or steri-strips over your wound after two days  Numbness in the region of the incision may occur following the surgery  This normally resolves in six to twelve months  It is normal to have some bruising, swelling or mild discoloration around the incision  If increasing redness or pain develops, call our office immediately  If you notice any active bleeding, apply pressure to the site and call 911 or go to the nearest Emergency Department       Appt w/ Dr Juwan Mcgee Doctor: 1/23/19 at 10:30am, [facility] office  [address] [place] [address]    [phone] [facility] [phone] TOLL FREE [phone]  [address] [facility] , [address], [place], [address]  [address], [place], [address]  [address], [facility], [address]  [address] [facility], [address] [facility], [facility], [address]  [address], [address], Celine Sharma [address]  [address] [facility], [facility] [address]   [address] [facility], [address] [facility], [address] [facility]  [address] [facility], [address] [facility], Bonnie Newman 6    FOLLOW UP STUDIES:  Doppler ultrasound studies, CT scans and abdominal x-rays are very important to your post-operative care  Your surgeon will arrange for them at your first postoperative visit

## 2018-12-31 NOTE — SOCIAL WORK
Pt is cleared for d/c by Cardiothoracic Surgery JEREMIE Gradylui Underwood was notified of d/c order  Pt is accepted for services by [facility] for his aftercare plan  The pt and his family declined SNF rehab placement  Pt will also receive a rolling walker from [facility] prior to d/c  The pt and his family were informed of d/c plan  Family will transport pt home later this day, pickup time TBD  IMM signed  No chart copy required  CM to follow

## 2018-12-31 NOTE — DISCHARGE SUMMARY
Discharge Summary - Cardiothoracic Surgery   [facility] 82 y o  male MRN: [id number]  Unit/Bed#: [facility] 422-01 Encounter: [id number]    Admission Date: 12/27/2018     Discharge Date: 12/31/18    Admitting Diagnosis: Thoracic aortic aneurysm without rupture [facility]) [I71 2]    Primary Discharge Diagnosis:   Descending thoracic aortic aneurysm  S/P thoracic endovascular aortic repair;    Secondary Discharge Diagnosis:   asthma, COPD, O2 dependent (2LNC), GERD, former tobacco use, HTN, h/o DVT LLE, PVD, varicose veins s/p vein stripping, popliteal artery ectasia b/l, ascending aortic aneurysm (3 7)    Attending: EVELYN Pineda  Consulting Physician(s):   Cardiology  Medical/Critical Care  Pulmonology    Procedures Performed:   Thoracic endovascular aneurysm repair with a 200x40 Bradley endovascular graft  Hospital Course: The patient was seen in consultation prior to this admission for evaluation of Descending thoracic aortic aneurysm  Risks and benefits of thoracic endovascular aortic repair were discussed in detail, and patient was agreeable  Routine preoperative evaluation was completed and informed consent was obtained prior to admission  12/27: TEVAR  Patient tolerated the procedure well & was transferred post-operatively to the ICU hemodynamically stable on no gtts  2+ DP/PT pulses  Wean to extubate  Lumbar drain in place with ICP goal <15  PM: should be extubated relatively soon, moving all extremities on Cardene @ 2 mg/hr  12/28: Supported with Cardene overnight; Wean off and resume home Lopressor, Norvasc, and Valsartan  Lumbar drain capped; consider removal if stable  Hypoxic overnight, requiring HFNC   12/29: No events  Restart Aspirin 81mg QDay  On HFNC (40%, 50L), wean to home O2 today  HTN, increase Norvasc to 10mg QDay  Discontinue TLC  WBC up to 11 21 from 9 23, encourages IS use  Home tomorrow    12/30: Febrile overnight x2, no narcotics/calf discomfort/urinary sx/wound issues, encourage ambulation/IS use, consult pulmonology  On 4LNC, wean to home [facility]  Transfer to telem  12/31: Tmax of 100 2 at 8PM last night  WBC remains wnl  CXR with no signs of pneumonia  On 3L NC this morning  Refusing acute rehab  Patient ready for discharge to home with family support, VNA, PT, OT, and rolling walker  Condition at Discharge:   good     Discharge Physical Exam:  HEENT/NECK:  PERRLA  No jugular venous distention  Cardiac: Regular rate and rhythm  No rubs/murmurs/gallops  Pulmonary:  Breath sounds slightly diminished at the bases bilaterally  Abdomen:  Non-tender, Non-distended  Positive bowel sounds  Incisions: Groin is c/d/i, non tender  Lower extremities: Extremities warm/dry  Radial/PT/DP pulses 2+ bilaterally  No edema B/L  Neuro: Alert and oriented X 3  Sensation is grossly intact  No focal deficits  Skin: Warm/Dry, without rashes or lesions  Discharge Data:    Results from last 7 days  Lab Units 12/31/18  0621 12/30/18  0512 12/29/18  0506   WBC Thousand/uL 8 55 9 11 11 21*   HEMOGLOBIN g/dL 13 5 13 8 13 7   HEMATOCRIT % 44 3 45 9 45 9   PLATELETS Thousands/uL 123* 122* 125*       Results from last 7 days  Lab Units 12/31/18  0621 12/30/18  0512 12/29/18  0506  12/27/18  1158   POTASSIUM mmol/L 3 9 4 3 4 2  < >  --    CHLORIDE mmol/L 98* 98* 98*  < >  --    CO2 mmol/L 37* 35* 39*  < >  --    CO2, I-STAT mmol/L  --   --   --   --  36*   BUN mg/dL 29* 29* 19  < >  --    CREATININE mg/dL 1 02 1 04 0 86  < >  --    GLUCOSE, ISTAT mg/dl  --   --   --   --  136   CALCIUM mg/dL 8 3 8 3 8 5  < >  --    < > = values in this interval not displayed  Discharge instructions/Information to patient and family:   See after visit summary for information provided to patient and family  Ignacia Hackett was educated on restrictions regarding driving and lifting, and techniques of proper incisional care    They were specifically counselled on signs and symptoms of a sternal wound infection, and advised to contact our service immediately should they develop fevers, sweats, chill, redness or drainage at the site of any incisions  Provisions for Follow-Up Care:  See after visit summary for information related to follow-up care and any pertinent home health orders  Disposition:  Home    Planned Readmission:   No    Discharge Medications:  See after visit summary for reconciled discharge medications provided to patient and family  Jessica Abebe was provided contact information and scheduled a follow up appointment with EVELYN Hahn  Additionally, they were advised to schedule follow up appointments to be seen by their primary care physician within 7-10 days, and their cardiologist within 2-3 weeks  Contact information was provided  Jessica Abebe was counseled on the importance of avoiding tobacco products  As with all patients whom have undergone open heart surgery, tobacco cessation medication was contraindicated at the time of discharge  ACE/ARB was Prescribed at discharge      The patient was discharged on ongoing diuretic therapy with Lasix 20 mg, PO QD  They were advised to continue these medications, unless otherwise directed  The patient was informed that following their postoperative surgical evaluation, they will be referred to outpatient cardiac rehabilitation  They were counseled that this program is run by specialists who will help them safely strengthen their heart and prevent more heart disease  Cardiac rehabilitation will include exercise, relaxation, stress management, and heart-healthy nutrition  Caregivers will also check to make sure their medication regimen is working  I spent 20 minutes discharging the patient  This time was spent on the day of discharge  I had direct contact with the patient on the day of discharge  Additional documentation is required if more than 30 minutes were spent on discharge       SIGNATURE: Vito Blanchard PA-C  DATE: December 31, 2018  TIME: 1:10 PM

## 2019-01-08 ENCOUNTER — TELEPHONE (OUTPATIENT)
Dept: CARDIAC SURGERY | Facility: CLINIC | Age: 84
End: 2019-01-08

## 2019-01-08 NOTE — TELEPHONE ENCOUNTER
Call placed to patient to follow-up after discharge from hospital, post-op  Spoke to: Shira Benjamin (patient's son)  Procedure & Date: TEVAR 12/27/18  Surgeon: Irena Grady wt: 161 lb  D/C wt: 158 lb   Current wt: 156 lb    Fever/chills: No     Lightheadedness/dizziness: No     Angina: No     SOB: No     Pain: Normal post-op pain, improving     Edema: Resolved     Incisions: Clean/dry/intact, no s/s of infection     GI: BM stable, appetite improving    Activity: Ambulating with no issues     Follow-up APPTs: PCP: 1/8/19, Vascular: 1/23/19, Cardiology: needs to schedule, CT surgery: 1/31/19    Comments: Patient's son states patient is doing well at this time, other than occasional post-op pain that is improving, he has no questions/concerns  Education:  1  Daily AM weight, call for weight gain of 2 lbs or more in 24 hours  2  Take frequent short walks, increase activity as tolerated  3  Maintain sternal precautions: No strenuous activity; no lifting more than 10 lbs;  no driving  4  Continue to use Incentive Spirometer frequently throughout the day  5  Shower daily; Cleanse incisions with antibacterial soap and water  6  No ointments, powder or lotions on surgical incisions  7   Call [facility] office with questions or concerns

## 2019-01-14 ENCOUNTER — LAB REQUISITION (OUTPATIENT)
Dept: LAB | Facility: HOSPITAL | Age: 84
End: 2019-01-14
Payer: COMMERCIAL

## 2019-01-14 DIAGNOSIS — J96.21 ACUTE AND CHRONIC RESPIRATORY FAILURE WITH HYPOXIA (HCC): ICD-10-CM

## 2019-01-14 DIAGNOSIS — Z48.812 ENCOUNTER FOR SURGICAL AFTERCARE FOLLOWING SURGERY OF CIRCULATORY SYSTEM: ICD-10-CM

## 2019-01-14 LAB
ANION GAP SERPL CALCULATED.3IONS-SCNC: 2 MMOL/L (ref 4–13)
BUN SERPL-MCNC: 18 MG/DL (ref 5–25)
CALCIUM SERPL-MCNC: 9.2 MG/DL (ref 8.3–10.1)
CHLORIDE SERPL-SCNC: 104 MMOL/L (ref 100–108)
CO2 SERPL-SCNC: 37 MMOL/L (ref 21–32)
CREAT SERPL-MCNC: 1.05 MG/DL (ref 0.6–1.3)
GFR SERPL CREATININE-BSD FRML MDRD: 66 ML/MIN/1.73SQ M
GLUCOSE SERPL-MCNC: 112 MG/DL (ref 65–140)
POTASSIUM SERPL-SCNC: 4.9 MMOL/L (ref 3.5–5.3)
SODIUM SERPL-SCNC: 143 MMOL/L (ref 136–145)

## 2019-01-14 PROCEDURE — 80048 BASIC METABOLIC PNL TOTAL CA: CPT | Performed by: FAMILY MEDICINE

## 2019-01-19 ENCOUNTER — HOSPITAL ENCOUNTER (INPATIENT)
Facility: HOSPITAL | Age: 84
LOS: 4 days | Discharge: HOME HEALTH | DRG: 351 | End: 2019-01-23
Attending: EMERGENCY MEDICINE | Admitting: FAMILY MEDICINE
Payer: COMMERCIAL

## 2019-01-19 ENCOUNTER — APPOINTMENT (EMERGENCY)
Dept: CT IMAGING | Facility: HOSPITAL | Age: 84
DRG: 351 | End: 2019-01-19
Payer: COMMERCIAL

## 2019-01-19 DIAGNOSIS — K40.30 INCARCERATED RIGHT INGUINAL HERNIA: ICD-10-CM

## 2019-01-19 DIAGNOSIS — R10.9 ABDOMINAL PAIN: ICD-10-CM

## 2019-01-19 DIAGNOSIS — I71.2 THORACIC AORTIC ANEURYSM WITHOUT RUPTURE (HCC): ICD-10-CM

## 2019-01-19 DIAGNOSIS — K56.609 SMALL BOWEL OBSTRUCTION (HCC): Primary | ICD-10-CM

## 2019-01-19 PROBLEM — I50.32 CHRONIC DIASTOLIC CHF (CONGESTIVE HEART FAILURE) (HCC): Status: ACTIVE | Noted: 2019-01-19

## 2019-01-19 LAB
ALBUMIN SERPL BCP-MCNC: 3.9 G/DL (ref 3–5.2)
ALP SERPL-CCNC: 83 U/L (ref 43–122)
ALT SERPL W P-5'-P-CCNC: 35 U/L (ref 9–52)
ANION GAP SERPL CALCULATED.3IONS-SCNC: 5 MMOL/L (ref 5–14)
AST SERPL W P-5'-P-CCNC: 38 U/L (ref 17–59)
BACTERIA UR QL AUTO: ABNORMAL /HPF
BASOPHILS # BLD AUTO: 0.1 THOUSANDS/ΜL (ref 0–0.1)
BASOPHILS NFR BLD AUTO: 1 % (ref 0–1)
BILIRUB SERPL-MCNC: 1 MG/DL
BILIRUB UR QL STRIP: NEGATIVE
BUN SERPL-MCNC: 20 MG/DL (ref 5–25)
CALCIUM SERPL-MCNC: 9.6 MG/DL (ref 8.4–10.2)
CHLORIDE SERPL-SCNC: 99 MMOL/L (ref 97–108)
CLARITY UR: CLEAR
CO2 SERPL-SCNC: 34 MMOL/L (ref 22–30)
COLOR UR: YELLOW
CREAT SERPL-MCNC: 0.85 MG/DL (ref 0.7–1.5)
EOSINOPHIL # BLD AUTO: 0.1 THOUSAND/ΜL (ref 0–0.4)
EOSINOPHIL NFR BLD AUTO: 1 % (ref 0–6)
ERYTHROCYTE [DISTWIDTH] IN BLOOD BY AUTOMATED COUNT: 13.9 %
GFR SERPL CREATININE-BSD FRML MDRD: 82 ML/MIN/1.73SQ M
GLUCOSE SERPL-MCNC: 130 MG/DL (ref 70–99)
GLUCOSE UR STRIP-MCNC: NEGATIVE MG/DL
HCT VFR BLD AUTO: 43.4 % (ref 41–53)
HGB BLD-MCNC: 13.7 G/DL (ref 13.5–17.5)
HGB UR QL STRIP.AUTO: NEGATIVE
KETONES UR STRIP-MCNC: NEGATIVE MG/DL
LEUKOCYTE ESTERASE UR QL STRIP: NEGATIVE
LYMPHOCYTES # BLD AUTO: 1 THOUSANDS/ΜL (ref 0.5–4)
LYMPHOCYTES NFR BLD AUTO: 12 % (ref 25–45)
MCH RBC QN AUTO: 31.4 PG (ref 26–34)
MCHC RBC AUTO-ENTMCNC: 31.6 G/DL (ref 31–36)
MCV RBC AUTO: 99 FL (ref 80–100)
MONOCYTES # BLD AUTO: 0.7 THOUSAND/ΜL (ref 0.2–0.9)
MONOCYTES NFR BLD AUTO: 8 % (ref 1–10)
MUCOUS THREADS UR QL AUTO: ABNORMAL
NEUTROPHILS # BLD AUTO: 6.5 THOUSANDS/ΜL (ref 1.8–7.8)
NEUTS SEG NFR BLD AUTO: 78 % (ref 45–65)
NITRITE UR QL STRIP: NEGATIVE
NON-SQ EPI CELLS URNS QL MICRO: ABNORMAL /HPF
PH UR STRIP.AUTO: 6.5 [PH] (ref 4.5–8)
PLATELET # BLD AUTO: 262 THOUSANDS/UL (ref 150–450)
PMV BLD AUTO: 7.2 FL (ref 8.9–12.7)
POTASSIUM SERPL-SCNC: 5.5 MMOL/L (ref 3.6–5)
PROT SERPL-MCNC: 8.2 G/DL (ref 5.9–8.4)
PROT UR STRIP-MCNC: ABNORMAL MG/DL
RBC # BLD AUTO: 4.37 MILLION/UL (ref 4.5–5.9)
RBC #/AREA URNS AUTO: ABNORMAL /HPF
SODIUM SERPL-SCNC: 138 MMOL/L (ref 137–147)
SP GR UR STRIP.AUTO: 1.02 (ref 1–1.04)
UROBILINOGEN UA: NEGATIVE MG/DL
WBC # BLD AUTO: 8.4 THOUSAND/UL (ref 4.5–11)
WBC #/AREA URNS AUTO: ABNORMAL /HPF

## 2019-01-19 PROCEDURE — 96374 THER/PROPH/DIAG INJ IV PUSH: CPT

## 2019-01-19 PROCEDURE — 96361 HYDRATE IV INFUSION ADD-ON: CPT

## 2019-01-19 PROCEDURE — 99232 SBSQ HOSP IP/OBS MODERATE 35: CPT | Performed by: SPECIALIST

## 2019-01-19 PROCEDURE — 81001 URINALYSIS AUTO W/SCOPE: CPT | Performed by: EMERGENCY MEDICINE

## 2019-01-19 PROCEDURE — 85025 COMPLETE CBC W/AUTO DIFF WBC: CPT | Performed by: EMERGENCY MEDICINE

## 2019-01-19 PROCEDURE — 99285 EMERGENCY DEPT VISIT HI MDM: CPT

## 2019-01-19 PROCEDURE — 80053 COMPREHEN METABOLIC PANEL: CPT | Performed by: EMERGENCY MEDICINE

## 2019-01-19 PROCEDURE — 96375 TX/PRO/DX INJ NEW DRUG ADDON: CPT

## 2019-01-19 PROCEDURE — 74177 CT ABD & PELVIS W/CONTRAST: CPT

## 2019-01-19 PROCEDURE — 93005 ELECTROCARDIOGRAM TRACING: CPT

## 2019-01-19 PROCEDURE — 36415 COLL VENOUS BLD VENIPUNCTURE: CPT | Performed by: EMERGENCY MEDICINE

## 2019-01-19 PROCEDURE — 99222 1ST HOSP IP/OBS MODERATE 55: CPT | Performed by: FAMILY MEDICINE

## 2019-01-19 RX ORDER — FUROSEMIDE 20 MG/1
20 TABLET ORAL DAILY
Status: DISCONTINUED | OUTPATIENT
Start: 2019-01-20 | End: 2019-01-22

## 2019-01-19 RX ORDER — AMLODIPINE BESYLATE 10 MG/1
10 TABLET ORAL DAILY
Status: DISCONTINUED | OUTPATIENT
Start: 2019-01-20 | End: 2019-01-22

## 2019-01-19 RX ORDER — DEXTROSE AND SODIUM CHLORIDE 5; .45 G/100ML; G/100ML
50 INJECTION, SOLUTION INTRAVENOUS CONTINUOUS
Status: DISCONTINUED | OUTPATIENT
Start: 2019-01-19 | End: 2019-01-22

## 2019-01-19 RX ORDER — ONDANSETRON 2 MG/ML
4 INJECTION INTRAMUSCULAR; INTRAVENOUS EVERY 6 HOURS PRN
Status: DISCONTINUED | OUTPATIENT
Start: 2019-01-19 | End: 2019-01-23 | Stop reason: HOSPADM

## 2019-01-19 RX ORDER — LOSARTAN POTASSIUM 50 MG/1
100 TABLET ORAL DAILY
Status: DISCONTINUED | OUTPATIENT
Start: 2019-01-20 | End: 2019-01-22

## 2019-01-19 RX ORDER — METOPROLOL TARTRATE 50 MG/1
50 TABLET, FILM COATED ORAL EVERY 12 HOURS SCHEDULED
Status: DISCONTINUED | OUTPATIENT
Start: 2019-01-19 | End: 2019-01-23 | Stop reason: HOSPADM

## 2019-01-19 RX ORDER — ACETAMINOPHEN 325 MG/1
650 TABLET ORAL EVERY 6 HOURS PRN
Status: DISCONTINUED | OUTPATIENT
Start: 2019-01-19 | End: 2019-01-22

## 2019-01-19 RX ORDER — BUDESONIDE AND FORMOTEROL FUMARATE DIHYDRATE 160; 4.5 UG/1; UG/1
2 AEROSOL RESPIRATORY (INHALATION) 2 TIMES DAILY
Status: DISCONTINUED | OUTPATIENT
Start: 2019-01-19 | End: 2019-01-23 | Stop reason: HOSPADM

## 2019-01-19 RX ORDER — ASPIRIN 81 MG/1
81 TABLET ORAL DAILY
Status: DISCONTINUED | OUTPATIENT
Start: 2019-01-20 | End: 2019-01-23 | Stop reason: HOSPADM

## 2019-01-19 RX ORDER — DEXTROSE, SODIUM CHLORIDE, AND POTASSIUM CHLORIDE 5; .45; .15 G/100ML; G/100ML; G/100ML
125 INJECTION INTRAVENOUS CONTINUOUS
Status: DISCONTINUED | OUTPATIENT
Start: 2019-01-19 | End: 2019-01-19

## 2019-01-19 RX ORDER — DOCUSATE SODIUM 100 MG/1
100 CAPSULE, LIQUID FILLED ORAL 2 TIMES DAILY
Status: DISCONTINUED | OUTPATIENT
Start: 2019-01-19 | End: 2019-01-23 | Stop reason: HOSPADM

## 2019-01-19 RX ORDER — POLYETHYLENE GLYCOL 3350 17 G/17G
17 POWDER, FOR SOLUTION ORAL DAILY
Status: DISCONTINUED | OUTPATIENT
Start: 2019-01-20 | End: 2019-01-23 | Stop reason: HOSPADM

## 2019-01-19 RX ORDER — PANTOPRAZOLE SODIUM 40 MG/1
40 TABLET, DELAYED RELEASE ORAL DAILY
Status: DISCONTINUED | OUTPATIENT
Start: 2019-01-20 | End: 2019-01-23 | Stop reason: HOSPADM

## 2019-01-19 RX ORDER — ONDANSETRON 2 MG/ML
4 INJECTION INTRAMUSCULAR; INTRAVENOUS ONCE
Status: COMPLETED | OUTPATIENT
Start: 2019-01-19 | End: 2019-01-19

## 2019-01-19 RX ORDER — ALBUTEROL SULFATE 90 UG/1
2 AEROSOL, METERED RESPIRATORY (INHALATION) EVERY 6 HOURS PRN
Status: DISCONTINUED | OUTPATIENT
Start: 2019-01-19 | End: 2019-01-23 | Stop reason: HOSPADM

## 2019-01-19 RX ORDER — ATORVASTATIN CALCIUM 10 MG/1
10 TABLET, FILM COATED ORAL DAILY
Status: DISCONTINUED | OUTPATIENT
Start: 2019-01-20 | End: 2019-01-23 | Stop reason: HOSPADM

## 2019-01-19 RX ADMIN — IOHEXOL 100 ML: 350 INJECTION, SOLUTION INTRAVENOUS at 16:18

## 2019-01-19 RX ADMIN — SODIUM CHLORIDE 1000 ML: 9 INJECTION, SOLUTION INTRAVENOUS at 12:07

## 2019-01-19 RX ADMIN — METOPROLOL TARTRATE 50 MG: 50 TABLET ORAL at 21:45

## 2019-01-19 RX ADMIN — MORPHINE SULFATE 2 MG: 2 INJECTION, SOLUTION INTRAMUSCULAR; INTRAVENOUS at 14:53

## 2019-01-19 RX ADMIN — MORPHINE SULFATE 2 MG: 2 INJECTION, SOLUTION INTRAMUSCULAR; INTRAVENOUS at 17:27

## 2019-01-19 RX ADMIN — MORPHINE SULFATE 2 MG: 2 INJECTION, SOLUTION INTRAMUSCULAR; INTRAVENOUS at 22:18

## 2019-01-19 RX ADMIN — DEXTROSE AND SODIUM CHLORIDE 100 ML/HR: 5; 450 INJECTION, SOLUTION INTRAVENOUS at 19:00

## 2019-01-19 RX ADMIN — ONDANSETRON HYDROCHLORIDE 4 MG: 2 INJECTION, SOLUTION INTRAMUSCULAR; INTRAVENOUS at 12:07

## 2019-01-19 RX ADMIN — BUDESONIDE AND FORMOTEROL FUMARATE DIHYDRATE 2 PUFF: 160; 4.5 AEROSOL RESPIRATORY (INHALATION) at 19:23

## 2019-01-19 RX ADMIN — IOHEXOL 50 ML: 240 INJECTION, SOLUTION INTRATHECAL; INTRAVASCULAR; INTRAVENOUS; ORAL at 13:26

## 2019-01-19 NOTE — ASSESSMENT & PLAN NOTE
Patient presented with acute abdominal pain secondary to small-bowel obstruction and possible right inguinal hernia incarcerated  Consult surgery  Keep him NPO except for meds and keep him on IV fluid hydration    Also placed on IV morphine for pain control

## 2019-01-19 NOTE — Clinical Note
Case was discussed with Dr Kathy Warren and the patient's admission status was agreed to be Admission Status: observation status to the service of Dr Kathy Warren

## 2019-01-19 NOTE — ASSESSMENT & PLAN NOTE
Patient has chronic respiratory failure secondary to COPD  He is on 2 L of oxygen all the time    Currently he is at his baseline

## 2019-01-19 NOTE — H&P
H&P- Francisco Cortes [birth year], 82 y o  male MRN: [id number]    Unit/Bed#: ED 05 Encounter: [id number]    Primary Care Provider: Lucia Rodriguez MD   Date and time admitted to hospital: 1/19/2019 11:29 AM        * Incarcerated right inguinal hernia   Assessment & Plan    CT of the abdomen pelvis shows small bowel obstruction with bowel loop within the right inguinal hernia  Patient has history of having right inguinal hernia repair done over 8 years ago at [place]  He is also recently had aortic aneurysm related surgery done  Will keep him NPO for now and place him on pain control  Will consult General surgery for evaluation  Family is concerned that he recently had endovascular repair done of his aortic aneurysm and do not want to have more surgeries  Explained to them that will watch for the 1st 24-48 hours and if no improvement will only then proceed with any surgical interventions     Abdominal pain   Assessment & Plan    Patient presented with acute abdominal pain secondary to small-bowel obstruction and possible right inguinal hernia incarcerated  Consult surgery  Keep him NPO except for meds and keep him on IV fluid hydration  Also placed on IV morphine for pain control     Chronic diastolic CHF (congestive heart failure) ([facility] [address] )   Assessment & Plan    Stable  Not in exacerbation  Continue Lasix oral daily     Chronic respiratory failure with hypoxia [facility])   Assessment & Plan    Patient has chronic respiratory failure secondary to COPD  He is on 2 L of oxygen all the time  Currently he is at his baseline     COPD (chronic obstructive pulmonary disease) ([facility] [address] )   Assessment & Plan    Currently at baseline  No exacerbation at this time  Continue Spiriva and albuterol     Hypertension   Assessment & Plan    Blood pressure is elevated secondary to pain    Will resume Norvasc, losartan, metoprolol with withholding parameters      Left renal lesion:  Needs outpatient Urology follow-up  VTE Prophylaxis: Enoxaparin (Lovenox)  / sequential compression device   Code Status: full code    POLST: There is no POLST form on file for this patient (pre-hospital)  Discussion with family:  Discussed with wife and 2 sons at bedside    Anticipated Length of Stay:  Patient will be admitted on an Inpatient basis with an anticipated length of stay of  more than 2 midnights  Justification for Hospital Stay:  Acute abdominal pain    Total Time for Visit, including Counseling / Coordination of Care: 1 hour  Greater than 50% of this total time spent on direct patient counseling and coordination of care  Chief Complaint:   Abdominal pain    History of Present Illness:    Merlene Lanza is a 82 y o  male who presents with acute abdominal pain  Patient states that he woke up this morning with acute abdominal pain  He states that it was 8/10 intensity sharp pain mainly in the lower part of his abdomen radiating to his right groin  He denies any vomiting or diarrhea  He states that he recently had endovascular repair of his aortic aneurysm done and is still recuperating from that  He was nauseated today  He did have a bowel movement this morning  Denies any chest pain or shortness of breath  He did have some chills but denies any fevers  Review of Systems:    Review of Systems   Constitutional: Positive for chills and fatigue  Negative for appetite change and fever  HENT: Negative for hearing loss, sore throat and trouble swallowing  Eyes: Negative for photophobia, discharge and visual disturbance  Respiratory: Negative for chest tightness and shortness of breath  Cardiovascular: Negative for chest pain and palpitations  Gastrointestinal: Positive for abdominal pain and nausea  Negative for blood in stool and vomiting  Endocrine: Negative for polydipsia and polyuria  Genitourinary: Negative for difficulty urinating, dysuria, flank pain and hematuria     Musculoskeletal: Negative for back pain and gait problem  Skin: Negative for rash  Allergic/Immunologic: Negative for environmental allergies and food allergies  Neurological: Negative for dizziness, seizures, syncope and headaches  Hematological: Does not bruise/bleed easily  Psychiatric/Behavioral: Negative for behavioral problems  All other systems reviewed and are negative  Past Medical and Surgical History:     Past Medical History:   Diagnosis Date    Appendicolith     Ascending aortic aneurysm (HCC)     3 7    Asthma     BPH (benign prostatic hyperplasia)     CAD (coronary artery disease)     noted on CT scan    COPD (chronic obstructive pulmonary disease) ([address] )     Descending thoracic aortic aneurysm (HCC)     Diverticulosis     Former tobacco use     GERD (gastroesophageal reflux disease)     History of DVT (deep vein thrombosis)     Left leg    History of transfusion     Hypertension     Inguinal hernia     right    Nephrolithiasis     Oxygen dependent     2LNC    Prostate calculus     PVD (peripheral vascular disease) (HCC)     Ulcer     Varicose vein of leg     b/l       Past Surgical History:   Procedure Laterality Date    ESOPHAGOGASTRODUODENOSCOPY      INGUINAL HERNIA REPAIR Bilateral     IR TEVAR  12/27/2018    NE ENDOVASC TAA REINCL SUBCL N/A 12/27/2018    Procedure: TEVAR - endovascular thoracic aortic aneurysm repair;  Surgeon: Ivis Ortega MD;  [facility]: [facility];  Service: Vascular    VARICOSE VEIN SURGERY Bilateral     vein stripping       Meds/Allergies:    Prior to Admission medications    Medication Sig Start Date End Date Taking?  Authorizing Provider   acetaminophen (TYLENOL) 325 mg tablet Take 2 tablets (650 mg total) by mouth every 6 (six) hours as needed for fever (temperature greater than 101 F) 12/31/18  Yes Lucas Munoz PA-C   albuterol (2 5 mg/3 mL) 0 083 % nebulizer solution Take 1 vial (2 5 mg total) by nebulization every 6 (six) hours as needed for wheezing or shortness of breath 6/26/18  Yes Cheryl Francis MD   albuterol (VENTOLIN HFA) 90 mcg/act inhaler Inhale 2 puffs 8/8/17  Yes Historical Provider, MD   amLODIPine (NORVASC) 10 mg tablet Take 1 tablet (10 mg total) by mouth daily 1/1/19  Yes Lucas Munoz PA-C   aspirin (ECOTRIN LOW STRENGTH) 81 mg EC tablet Take 1 tablet (81 mg total) by mouth daily 1/1/19  Yes Lucas Munoz PA-C   atorvastatin (LIPITOR) 10 mg tablet Take 10 mg by mouth daily   Yes Historical Provider, MD   docusate sodium (COLACE) 100 mg capsule Take 1 capsule (100 mg total) by mouth 2 (two) times a day 12/31/18  Yes Lucas Munoz PA-C   furosemide (LASIX) 20 mg tablet Take 1 tablet (20 mg total) by mouth daily 1/1/19  Yes Lucas Munoz PA-C   losartan (COZAAR) 100 MG tablet Take 1 tablet (100 mg total) by mouth daily 1/1/19  Yes Lucas Munoz PA-C   metoprolol tartrate (LOPRESSOR) 50 mg tablet Take 1 tablet (50 mg total) by mouth every 12 (twelve) hours 12/31/18  Yes Lucas Munoz PA-C   pantoprazole (PROTONIX) 40 mg tablet Take 1 tablet (40 mg total) by mouth daily 8/20/18  Yes Cheryl Francis MD   polyethylene glycol (MIRALAX) 17 g packet Take 17 g by mouth daily 1/1/19  Yes Lucas Munoz PA-C   tiotropium (SPIRIVA) 18 mcg inhalation capsule Place 18 mcg into inhaler and inhale 9/18/18 9/18/19 Yes Historical Provider, MD   budesonide-formoterol (SYMBICORT) 160-4 5 mcg/act inhaler Inhale 2 puffs 2 (two) times a day Rinse mouth after use  Historical Provider, MD     I have reviewed home medications with patient personally  Allergies:    Allergies   Allergen Reactions    Penicillins Hives, Itching and Rash    Lisinopril Rash     Side pains and rash        Social History:     Marital Status: /Civil Union   History   Alcohol Use No     History   Smoking Status    Former Smoker    Packs/day: 1 00    Years: 35 00    Start date: 1966    Quit date: 2001   Smokeless Tobacco    Never Used     History Drug Use No       Family History:    Family History   Problem Relation Age of Onset    Tuberculosis Mother     No Known Problems Father     Cancer Sister     Diabetes Family     Hypertension Family        Physical Exam:     Vitals:   Blood Pressure: 165/94 (01/19/19 1730)  Pulse: 92 (01/19/19 1730)  Temperature: 98 6 °F (37 °C) (01/19/19 1125)  Temp Source: Tympanic (01/19/19 1125)  Respirations: 17 (01/19/19 1730)  Height: 5' 2" (157 5 cm) (01/19/19 1125)  Weight - Scale: 71 2 kg (157 lb) (01/19/19 1125)  SpO2: 97 % (01/19/19 1730)    Physical Exam   Constitutional: He is oriented to person, place, and time  He appears well-developed and well-nourished  HENT:   Head: Normocephalic and atraumatic  Right Ear: External ear normal    Left Ear: External ear normal    Mouth/Throat: Oropharynx is clear and moist    Eyes: Pupils are equal, round, and reactive to light  Conjunctivae and EOM are normal    Neck: Normal range of motion  Neck supple  Cardiovascular: Normal rate, regular rhythm, normal heart sounds and intact distal pulses  Pulmonary/Chest: Effort normal and breath sounds normal    Abdominal: Soft  Bowel sounds are normal  He exhibits no mass  There is tenderness  There is no rebound and no guarding  Tenderness on the right paraumbilical region radiating to the right groin  Positive right inguinal hernia noted  Genitourinary:   Genitourinary Comments: deferred   Musculoskeletal: Normal range of motion  Neurological: He is alert and oriented to person, place, and time  Skin: Skin is warm and dry  No rash noted  Psychiatric: He has a normal mood and affect  Nursing note and vitals reviewed            Additional Data:     Lab Results: I have personally reviewed pertinent films in PACS      Results from last 7 days  Lab Units 01/19/19  1206   WBC Thousand/uL 8 40   HEMOGLOBIN g/dL 13 7   HEMATOCRIT % 43 4   PLATELETS Thousands/uL 262   NEUTROS PCT % 78*   LYMPHS PCT % 12*   MONOS PCT % 8 EOS PCT % 1       Results from last 7 days  Lab Units 01/19/19  1253   SODIUM mmol/L 138   POTASSIUM mmol/L 5 5*   CHLORIDE mmol/L 99   CO2 mmol/L 34*   BUN mg/dL 20   CREATININE mg/dL 0 85   ANION GAP mmol/L 5   CALCIUM mg/dL 9 6   ALBUMIN g/dL 3 9   TOTAL BILIRUBIN mg/dL 1 00   ALK PHOS U/L 83   ALT U/L 35   AST U/L 38   GLUCOSE RANDOM mg/dL 130*                       Imaging: I have personally reviewed pertinent films in PACS    CT abdomen pelvis with contrast   Final Result by Artem Yao MD (01/19 1632)      The known small bowel containing right inguinal hernia now serves as a transition point for small bowel obstruction  Moderately dilated loops of small bowel proximal to the hernia  Indeterminate left renal lesion as previously described  Consider follow-up with nonurgent renal MRI      The study was marked in EPIC for immediate notification  Workstation performed: [id number]             EKG, Pathology, and Other Studies Reviewed on Admission:   · EKG:  Normal sinus rhythm at 85 beats per minute    Allscripts / Epic Records Reviewed: Yes     ** Please Note: This note has been constructed using a voice recognition system   **

## 2019-01-19 NOTE — ASSESSMENT & PLAN NOTE
Blood pressure is elevated secondary to pain    Will resume Norvasc, losartan, metoprolol with withholding parameters

## 2019-01-19 NOTE — ASSESSMENT & PLAN NOTE
CT of the abdomen pelvis shows small bowel obstruction with bowel loop within the right inguinal hernia  Patient has history of having right inguinal hernia repair done over 8 years ago at [place]  He is also recently had aortic aneurysm related surgery done  Will keep him NPO for now and place him on pain control  Will consult General surgery for evaluation  Family is concerned that he recently had endovascular repair done of his aortic aneurysm and do not want to have more surgeries    Explained to them that will watch for the 1st 24-48 hours and if no improvement will only then proceed with any surgical interventions

## 2019-01-19 NOTE — ED NOTES
Called 5th tower to give nurse report, receiving nurse will call back department, charge nurse notified     Benson Vann RN  01/19/19 [phone]

## 2019-01-20 LAB
ANION GAP SERPL CALCULATED.3IONS-SCNC: 4 MMOL/L (ref 5–14)
ATRIAL RATE: 71 BPM
ATRIAL RATE: 85 BPM
BASOPHILS # BLD AUTO: 0 THOUSANDS/ΜL (ref 0–0.1)
BASOPHILS NFR BLD AUTO: 0 % (ref 0–1)
BUN SERPL-MCNC: 12 MG/DL (ref 5–25)
CALCIUM SERPL-MCNC: 9 MG/DL (ref 8.4–10.2)
CHLORIDE SERPL-SCNC: 97 MMOL/L (ref 97–108)
CO2 SERPL-SCNC: 36 MMOL/L (ref 22–30)
CREAT SERPL-MCNC: 0.84 MG/DL (ref 0.7–1.5)
EOSINOPHIL # BLD AUTO: 0.2 THOUSAND/ΜL (ref 0–0.4)
EOSINOPHIL NFR BLD AUTO: 2 % (ref 0–6)
ERYTHROCYTE [DISTWIDTH] IN BLOOD BY AUTOMATED COUNT: 13.8 %
GFR SERPL CREATININE-BSD FRML MDRD: 82 ML/MIN/1.73SQ M
GLUCOSE SERPL-MCNC: 138 MG/DL (ref 70–99)
GLUCOSE SERPL-MCNC: 138 MG/DL (ref 70–99)
HCT VFR BLD AUTO: 42.5 % (ref 41–53)
HGB BLD-MCNC: 13.3 G/DL (ref 13.5–17.5)
LYMPHOCYTES # BLD AUTO: 1 THOUSANDS/ΜL (ref 0.5–4)
LYMPHOCYTES NFR BLD AUTO: 12 % (ref 25–45)
MCH RBC QN AUTO: 31.1 PG (ref 26–34)
MCHC RBC AUTO-ENTMCNC: 31.4 G/DL (ref 31–36)
MCV RBC AUTO: 99 FL (ref 80–100)
MONOCYTES # BLD AUTO: 1 THOUSAND/ΜL (ref 0.2–0.9)
MONOCYTES NFR BLD AUTO: 12 % (ref 1–10)
NEUTROPHILS # BLD AUTO: 6.2 THOUSANDS/ΜL (ref 1.8–7.8)
NEUTS SEG NFR BLD AUTO: 74 % (ref 45–65)
P AXIS: 52 DEGREES
P AXIS: 66 DEGREES
PLATELET # BLD AUTO: 228 THOUSANDS/UL (ref 150–450)
PMV BLD AUTO: 7.1 FL (ref 8.9–12.7)
POTASSIUM SERPL-SCNC: 4.4 MMOL/L (ref 3.6–5)
PR INTERVAL: 142 MS
PR INTERVAL: 162 MS
QRS AXIS: 44 DEGREES
QRS AXIS: 55 DEGREES
QRSD INTERVAL: 82 MS
QRSD INTERVAL: 86 MS
QT INTERVAL: 362 MS
QT INTERVAL: 394 MS
QTC INTERVAL: 428 MS
QTC INTERVAL: 430 MS
RBC # BLD AUTO: 4.3 MILLION/UL (ref 4.5–5.9)
SODIUM SERPL-SCNC: 137 MMOL/L (ref 137–147)
T WAVE AXIS: 37 DEGREES
T WAVE AXIS: 42 DEGREES
TSH SERPL DL<=0.05 MIU/L-ACNC: 0.58 UIU/ML (ref 0.47–4.68)
VENTRICULAR RATE: 71 BPM
VENTRICULAR RATE: 85 BPM
WBC # BLD AUTO: 8.4 THOUSAND/UL (ref 4.5–11)

## 2019-01-20 PROCEDURE — 93005 ELECTROCARDIOGRAM TRACING: CPT

## 2019-01-20 PROCEDURE — 82948 REAGENT STRIP/BLOOD GLUCOSE: CPT

## 2019-01-20 PROCEDURE — 93010 ELECTROCARDIOGRAM REPORT: CPT | Performed by: INTERNAL MEDICINE

## 2019-01-20 PROCEDURE — 99232 SBSQ HOSP IP/OBS MODERATE 35: CPT | Performed by: FAMILY MEDICINE

## 2019-01-20 PROCEDURE — 99232 SBSQ HOSP IP/OBS MODERATE 35: CPT | Performed by: SPECIALIST

## 2019-01-20 PROCEDURE — 85025 COMPLETE CBC W/AUTO DIFF WBC: CPT | Performed by: FAMILY MEDICINE

## 2019-01-20 PROCEDURE — 84443 ASSAY THYROID STIM HORMONE: CPT | Performed by: FAMILY MEDICINE

## 2019-01-20 PROCEDURE — 80048 BASIC METABOLIC PNL TOTAL CA: CPT | Performed by: FAMILY MEDICINE

## 2019-01-20 RX ADMIN — MORPHINE SULFATE 2 MG: 2 INJECTION, SOLUTION INTRAMUSCULAR; INTRAVENOUS at 02:19

## 2019-01-20 RX ADMIN — BUDESONIDE AND FORMOTEROL FUMARATE DIHYDRATE 2 PUFF: 160; 4.5 AEROSOL RESPIRATORY (INHALATION) at 21:18

## 2019-01-20 RX ADMIN — FUROSEMIDE 20 MG: 20 TABLET ORAL at 09:55

## 2019-01-20 RX ADMIN — DEXTROSE AND SODIUM CHLORIDE 100 ML/HR: 5; 450 INJECTION, SOLUTION INTRAVENOUS at 02:18

## 2019-01-20 RX ADMIN — BUDESONIDE AND FORMOTEROL FUMARATE DIHYDRATE 2 PUFF: 160; 4.5 AEROSOL RESPIRATORY (INHALATION) at 07:46

## 2019-01-20 RX ADMIN — DOCUSATE SODIUM 100 MG: 100 CAPSULE, LIQUID FILLED ORAL at 09:55

## 2019-01-20 RX ADMIN — METOPROLOL TARTRATE 50 MG: 50 TABLET ORAL at 21:21

## 2019-01-20 RX ADMIN — POLYETHYLENE GLYCOL 3350 17 G: 17 POWDER, FOR SOLUTION ORAL at 09:54

## 2019-01-20 RX ADMIN — DOCUSATE SODIUM 100 MG: 100 CAPSULE, LIQUID FILLED ORAL at 17:06

## 2019-01-20 RX ADMIN — MORPHINE SULFATE 2 MG: 2 INJECTION, SOLUTION INTRAMUSCULAR; INTRAVENOUS at 07:47

## 2019-01-20 RX ADMIN — ENOXAPARIN SODIUM 40 MG: 40 INJECTION SUBCUTANEOUS at 09:55

## 2019-01-20 RX ADMIN — LOSARTAN POTASSIUM 100 MG: 50 TABLET, FILM COATED ORAL at 09:55

## 2019-01-20 RX ADMIN — ONDANSETRON 4 MG: 2 SOLUTION INTRAMUSCULAR; INTRAVENOUS at 05:24

## 2019-01-20 RX ADMIN — ASPIRIN 81 MG: 81 TABLET, COATED ORAL at 09:55

## 2019-01-20 RX ADMIN — ATORVASTATIN CALCIUM 10 MG: 10 TABLET, FILM COATED ORAL at 09:55

## 2019-01-20 RX ADMIN — METOPROLOL TARTRATE 50 MG: 50 TABLET ORAL at 09:56

## 2019-01-20 RX ADMIN — AMLODIPINE BESYLATE 10 MG: 10 TABLET ORAL at 09:56

## 2019-01-20 RX ADMIN — TIOTROPIUM BROMIDE 18 MCG: 18 CAPSULE ORAL; RESPIRATORY (INHALATION) at 07:45

## 2019-01-20 RX ADMIN — PANTOPRAZOLE SODIUM 40 MG: 40 TABLET, DELAYED RELEASE ORAL at 06:21

## 2019-01-20 NOTE — UTILIZATION REVIEW
Notification of Inpatient Admission/Inpatient Authorization Request  This is a Notification of Inpatient Admission/Request for Inpatient Authorization for our facility [address] [place] [address]  Be advised that this patient was admitted to our facility under Inpatient Status  Please contact the Utilization Review Department where the patient is receiving care services for additional admission information  Place of Service Code: 24   Place of Service Name: Inpatient Hospital  Presentation Date & Time: 1/19/2019 11:29 AM  Inpatient Admission Date & Time: 1/19/19 1658  Discharge Date & Time: No discharge date for patient encounter  Discharge Disposition (if discharged): Home with [facility]  Attending Physician: Tod Garcia [[id number]]    Admission Orders     Ordered        01/19/19 1658  Inpatient Admission (expected length of stay for this patient is greater than two midnights)  Once               Facility: [address] [place] [address]  Address: Mishel Vanegas [address] [place] [address]  Phone: [phone] Tax ID: [id number]  NPI: [id number]  Medicare ID: [id number]    [facility] Utilization Review Department  Phone: [phone]; Fax [phone]  ATTENTION: Please call with any questions or concerns to [phone]  and carefully listen to the prompts so that you are directed to the right person  Send all requests for admission clinical reviews, approved or denied determinations and any other requests to fax [phone]   All voicemails are confidential

## 2019-01-20 NOTE — NURSING NOTE
Patient's family reporting patient "passed out" when returning to bed from trying to use the bathroom to have a bowel movement  Patient is now awake, alert, and oriented times four  Orthostatic vital signs are negative  Blood sugar is 138  EKG obtained  Patient's son reports patient walked to the bathroom to attempt to have a bowel movement  He then walked back to bed after being unsuccessful  "He sat on the edge of the bed and got very cold, turned pale, got dizzy and fainted "  PA made aware  Patient now back in bed in semi-britt's position, no complaints of dizziness or chest pain

## 2019-01-20 NOTE — PROGRESS NOTES
Progress Note - Mark Rowley [birth year], 82 y o  male MRN: [id number]    Unit/Bed#: 5T -01 Encounter: [id number]    Primary Care Provider: Lauri Wood MD   Date and time admitted to hospital: 1/19/2019 11:29 AM        * Incarcerated right inguinal hernia   Assessment & Plan    CT of the abdomen pelvis shows small bowel obstruction with bowel loop within the right inguinal hernia  Patient has history of having right inguinal hernia repair done over 8 years ago at [place]  He is also recently had aortic aneurysm related surgery done  Will keep him NPO for now and place him on pain control  Will consult General surgery for evaluation  Family is concerned that he recently had endovascular repair done of his aortic aneurysm and do not want to have more surgeries  Explained to them that will watch for the 1st 24-48 hours and if no improvement will only then proceed with any surgical interventions    Will hold morphine for now and see if the pain returns  So far no flatus or bowel movement  He did have a slight syncopal episode earlier this morning  Will observe him for now  Abdominal pain   Assessment & Plan    Patient presented with acute abdominal pain secondary to small-bowel obstruction and possible right inguinal hernia incarcerated  Consult surgery  Keep him NPO except for meds and keep him on IV fluid hydration  Also placed on IV morphine for pain control     Chronic diastolic CHF (congestive heart failure) ([facility] [address] )   Assessment & Plan    Stable  Not in exacerbation  Continue Lasix oral daily     Chronic respiratory failure with hypoxia [facility])   Assessment & Plan    Patient has chronic respiratory failure secondary to COPD  He is on 2 L of oxygen all the time  Currently he is at his baseline     COPD (chronic obstructive pulmonary disease) ([facility] [address] )   Assessment & Plan    Currently at baseline  No exacerbation at this time    Continue Spiriva and albuterol     Hypertension Assessment & Plan    Blood pressure is controlled  Will resume Norvasc, losartan, metoprolol with withholding parameters       VTE Pharmacologic Prophylaxis:   Pharmacologic: Enoxaparin (Lovenox)  Mechanical VTE Prophylaxis in Place: Yes    Patient Centered Rounds: I have performed bedside rounds with nursing staff today  Discussions with Specialists or Other Care Team Provider: will discuss with surgery today    Education and Discussions with Family / Patient:  Discussed with patient and his family present at bedside about his hospital course    Time Spent for Care: 30 minutes  More than 50% of total time spent on counseling and coordination of care as described above  Current Length of Stay: 1 day(s)    Current Patient Status: Inpatient   Certification Statement: The patient will continue to require additional inpatient hospital stay due to Abdominal pain    Discharge Plan: To home once abdominal pain resolves    Code Status: Level 1 - Full Code      Subjective:   Patient denies any chest pain or shortness of breath  He did have a slight syncopal episode this morning when he was coming back from the bathroom  No bowel movement yet  No flatus  Denies any nausea  He feels his abdominal pain is improving and is down to a 2/10  Objective:     Vitals:   Temp (24hrs), Av 9 °F (37 2 °C), Min:98 2 °F (36 8 °C), Max:100 3 °F (37 9 °C)    Temp:  [98 2 °F (36 8 °C)-100 3 °F (37 9 °C)] 98 7 °F (37 1 °C)  HR:  [64-94] 72  Resp:  [16-20] 18  BP: (132-165)/(69-94) 132/69  SpO2:  [92 %-97 %] 94 %  Body mass index is 28 08 kg/m²  Input and Output Summary (last 24 hours): Intake/Output Summary (Last 24 hours) at 19 1021  Last data filed at 19 0601   Gross per 24 hour   Intake          2101 66 ml   Output              300 ml   Net          1801 66 ml       Physical Exam:     Physical Exam   Constitutional: He is oriented to person, place, and time   He appears well-developed and well-nourished  HENT:   Head: Normocephalic and atraumatic  Right Ear: External ear normal    Left Ear: External ear normal    Mouth/Throat: Oropharynx is clear and moist    Eyes: Pupils are equal, round, and reactive to light  Conjunctivae and EOM are normal    Neck: Normal range of motion  Neck supple  Cardiovascular: Normal rate, regular rhythm, normal heart sounds and intact distal pulses  Pulmonary/Chest: Effort normal and breath sounds normal    Abdominal: Soft  He exhibits no mass  There is no rebound and no guarding  Hypoactive bowel sounds  No tenderness elicited on palpation of the right periumbilical region or right groin  Still has a non reducible hernia in the right inguinal area   Genitourinary:   Genitourinary Comments: deferred   Musculoskeletal: Normal range of motion  Neurological: He is alert and oriented to person, place, and time  Skin: Skin is warm and dry  No rash noted  Psychiatric: He has a normal mood and affect  Nursing note and vitals reviewed  Additional Data:     Labs:      Results from last 7 days  Lab Units 01/20/19  0458   WBC Thousand/uL 8 40   HEMOGLOBIN g/dL 13 3*   HEMATOCRIT % 42 5   PLATELETS Thousands/uL 228   NEUTROS PCT % 74*   LYMPHS PCT % 12*   MONOS PCT % 12*   EOS PCT % 2       Results from last 7 days  Lab Units 01/20/19  0457 01/19/19  1253   SODIUM mmol/L 137 138   POTASSIUM mmol/L 4 4 5 5*   CHLORIDE mmol/L 97 99   CO2 mmol/L 36* 34*   BUN mg/dL 12 20   CREATININE mg/dL 0 84 0 85   ANION GAP mmol/L 4* 5   CALCIUM mg/dL 9 0 9 6   ALBUMIN g/dL  --  3 9   TOTAL BILIRUBIN mg/dL  --  1 00   ALK PHOS U/L  --  83   ALT U/L  --  35   AST U/L  --  38   GLUCOSE RANDOM mg/dL 138* 130*           Results from last 7 days  Lab Units 01/20/19  0601   POC GLUCOSE mg/dl 138*                   * I Have Reviewed All Lab Data Listed Above  * Additional Pertinent Lab Tests Reviewed:  [address] Admission Reviewed    Imaging:    Imaging Reports Reviewed Today Include: none  Imaging Personally Reviewed by Myself Includes:  none    Recent Cultures (last 7 days):           Last 24 Hours Medication List:     Current Facility-Administered Medications:  acetaminophen 650 mg Oral Q6H PRN Felicitas Lay MD    albuterol 2 puff Inhalation Q6H PRN Felicitas Lay MD    albuterol 2 5 mg Nebulization Q6H PRN Felicitas Lay MD    amLODIPine 10 mg Oral Daily Felicitas Lay MD    aspirin 81 mg Oral Daily Felicitas Lay MD    atorvastatin 10 mg Oral Daily Felicitas Lay MD    budesonide-formoterol 2 puff Inhalation BID Felicitas Lay MD    dextrose 5 % and sodium chloride 0 45 % 100 mL/hr Intravenous Continuous Felicitas Lay MD Last Rate: 100 mL/hr (01/20/19 0218)   docusate sodium 100 mg Oral BID Felicitas Lay MD    enoxaparin 40 mg Subcutaneous Daily Felicitas Lay MD    furosemide 20 mg Oral Daily Felicitas Lay MD    losartan 100 mg Oral Daily Felicitas Lay MD    metoprolol tartrate 50 mg Oral Q12H [address] Felicitas Lay MD    morphine injection 2 mg Intravenous Q4H PRN Felicitas Lay MD    ondansetron 4 mg Intravenous Q6H PRN Felicitas Lay MD    pantoprazole 40 mg Oral Daily Felicitas Lay MD    polyethylene glycol 17 g Oral Daily Felicitas Lay MD    tiotropium 18 mcg Inhalation Daily Felicitas Lay MD         Today, Patient Was Seen By: Felicitas Lay MD    ** Please Note: Dictation voice to text software may have been used in the creation of this document   **

## 2019-01-20 NOTE — PROGRESS NOTES
Patient awake and alert  Abdominal pain improved  Passing flatus no BM  WANTS something to drink  Vital signs stable pulse some pressure acceptable  Abdomen:  Soft  Minimal tenderness noted  Hernia and right inguinal area  Proceeded to reduce this at this time  Impression:  Partial small-bowel obstruction secondary to incarcerated right inguinal hernia  Reduced at this time  Plan:  Start clear liquids  Out of bed  Advance diet as tolerated

## 2019-01-20 NOTE — NURSING NOTE
Pt assited back to bed from BR  VSS  Denies pain, reports gas  Tolerating clear liquid diet  Call bell in reach  Daughter at bedside  Will continue to monitor

## 2019-01-20 NOTE — NURSING NOTE
Patient appears to be resting comfortably in bed in semi-britt's position; eyes closed and chest movements noted  Patient woke easily for vital signs and did complain of 8/10 abdominal pain, he received PRN Morphine for this  Patient is calm and cooperative with care  Patient is awake and oriented times four  Vital signs remain stabl  Call bell in reach and bed in low position  Assessment other wise unchanged at this time, will continue to monitor

## 2019-01-20 NOTE — ASSESSMENT & PLAN NOTE
Blood pressure is controlled    Will resume Norvasc, losartan, metoprolol with withholding parameters

## 2019-01-20 NOTE — NURSING NOTE
Pt resting in bed, ate minimal dinner  Reports feeling full  Pt has been belching  Abdomen softly distended, with positive bowel sounds  Abdomen tender to palpation  Pt denies pain  Encouraged pt to ambulate  Pt agrees to walk once wife is here  Daughter at bedside  Will continue to monitor

## 2019-01-20 NOTE — UTILIZATION REVIEW
Initial Clinical Review    Admission: Date/Time/Statement: 1/19/19 @ 1658   Orders Placed This Encounter   Procedures    Inpatient Admission (expected length of stay for this patient is greater than two midnights)     Standing Status:   Standing     Number of Occurrences:   1     Order Specific Question:   Admitting Physician     Answer:   Anel Bunch [[id number]]     Order Specific Question:   Level of Care     Answer:   Med Surg [16]     Order Specific Question:   Estimated length of stay     Answer:   More than 2 Midnights     Order Specific Question:   Certification     Answer:   I certify that inpatient services are medically necessary for this patient for a duration of greater than two midnights  See H&P and MD Progress Notes for additional information about the patient's course of treatment  ED: Date/Time/Mode of Arrival:   ED Arrival Information     Expected Arrival Acuity Means of Arrival Escorted By Service Admission Type    - 1/19/2019 10:57 Urgent Walk-In Spouse General Medicine Urgent    Arrival Complaint    low abd/pelvic pain        Chief Complaint:   Chief Complaint   Patient presents with    Abdominal Pain     He has lower abdominal pain since last night  He has nausea, but no vomiting  He has no diarrhes  No pain on urination  History of Illness: Phuc Stubbs is a 82 y o  male who presents with acute abdominal pain  Patient states that he woke up this morning with acute abdominal pain  He states that it was 8/10 intensity sharp pain mainly in the lower part of his abdomen radiating to his right groin  He denies any vomiting or diarrhea  He states that he recently had endovascular repair of his aortic aneurysm done and is still recuperating from that  He was nauseated today  He did have a bowel movement this morning  Denies any chest pain or shortness of breath    He did have some chills but denies any fevers        ED Vital Signs:   ED Triage Vitals   Temperature Pulse Respirations Blood Pressure SpO2   01/19/19 1125 01/19/19 1125 01/19/19 1125 01/19/19 1125 01/19/19 1125   98 6 °F (37 °C) 94 20 148/78 96 %      Temp Source Heart Rate Source Patient Position - Orthostatic VS BP Location FiO2 (%)   01/19/19 1125 01/19/19 1212 01/19/19 1125 01/19/19 1125 --   Tympanic Monitor Sitting Left arm       Pain Score       01/19/19 1125       8        Wt Readings from Last 1 Encounters:   01/19/19 71 9 kg (158 lb 8 2 oz)     Vital Signs (abnormal): Above    Pertinent Labs/Diagnostic Test Results:    U/a   Tr protein  K  5 5  CO2  34  Ct abd/pelvis: The known small bowel containing right inguinal hernia now serves as a transition point for small bowel obstruction   Moderately dilated loops of small bowel proximal to the hernia  Indeterminate left renal lesion as previously described   Consider follow-up with nonurgent renal MRI    ED Treatment:   Medication Administration from 01/19/2019 1056 to 01/19/2019 1826       Date/Time Order Dose Route Action Action by Comments     01/19/2019 1429 sodium chloride 0 9 % bolus 1,000 mL 0 mL Intravenous Stopped Marko Argueta RN      01/19/2019 1207 sodium chloride 0 9 % bolus 1,000 mL 1,000 mL Intravenous New Bag Marko Argueta RN      01/19/2019 1207 ondansetron (ZOFRAN) injection 4 mg 4 mg Intravenous Given Marko Argueta RN      01/19/2019 1326 iohexol (OMNIPAQUE) 240 MG/ML solution 50 mL 50 mL Oral Given Marko Argueta RN      01/19/2019 1618 iohexol (OMNIPAQUE) 350 MG/ML injection (MULTI-DOSE) 100 mL 100 mL Intravenous Given Lindsay Trisha      01/19/2019 1453 morphine injection 2 mg 2 mg Intravenous Given Marko Argueta RN      01/19/2019 1727 morphine injection 2 mg 2 mg Intravenous Given Marko Argueta RN         Past Medical/Surgical History:    Active Ambulatory Problems     Diagnosis Date Noted    Hypertension 02/01/2018    COPD (chronic obstructive pulmonary disease) [facility] [address] ) 02/01/2018    Descending aortic aneurysm ([facility] [address] ) 02/01/2018    History of DVT (deep vein thrombosis) 02/01/2018    Benign essential hypertension 08/02/2017    Thoracic aortic aneurysm ([facility] [address] ) 08/02/2017    Chronic respiratory failure with hypoxia ([facility]) 04/02/2018    Varicose veins of both lower extremities with pain 05/23/2018    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[facility]) 05/23/2018    Edema of both legs 08/02/2018    Snoring 08/02/2018    Thoracic aortic aneurysm without rupture ([facility]) 11/19/2018    Aneurysm, aorta, thoracic ([facility] [address] )     Acute on chronic respiratory failure with hypoxia and hypercapnia ([facility]) 12/28/2018    Leukocytosis 12/29/2018    Thrombocytopenia ([address] ) 12/29/2018    Hypochloremia 12/29/2018     Resolved Ambulatory Problems     Diagnosis Date Noted    Pneumonia of both lungs due to infectious organism 07/18/2018     Past Medical History:   Diagnosis Date    Appendicolith     Ascending aortic aneurysm ([facility])     Asthma     BPH (benign prostatic hyperplasia)     CAD (coronary artery disease)     COPD (chronic obstructive pulmonary disease) ([facility]     Descending thoracic aortic aneurysm ([facility] [address] )     Diverticulosis     Former tobacco use     GERD (gastroesophageal reflux disease)     History of DVT (deep vein thrombosis)     History of transfusion     Hypertension     Inguinal hernia     Nephrolithiasis     Oxygen dependent     Prostate calculus     PVD (peripheral vascular disease) ([facility]     Ulcer     Varicose vein of leg      Admitting Diagnosis: Small bowel obstruction ([facility] [address] ) [K56 609]  Abdominal pain [R10 9]  Incarcerated right inguinal hernia [K40 30]     Assessment/Plan:   Incarcerated right inguinal hernia   Assessment & Plan     CT of the abdomen pelvis shows small bowel obstruction with bowel loop within the right inguinal hernia  Patient has history of having right inguinal hernia repair done over 8 years ago at [place]  He is also recently had aortic aneurysm related surgery done    Will keep him NPO for now and place him on pain control  Will consult General surgery for evaluation  Family is concerned that he recently had endovascular repair done of his aortic aneurysm and do not want to have more surgeries  Explained to them that will watch for the 1st 24-48 hours and if no improvement will only then proceed with any surgical interventions      Abdominal pain   Assessment & Plan     Patient presented with acute abdominal pain secondary to small-bowel obstruction and possible right inguinal hernia incarcerated  Consult surgery  Keep him NPO except for meds and keep him on IV fluid hydration  Also placed on IV morphine for pain control      Chronic diastolic CHF (congestive heart failure) ([address] )   Assessment & Plan     Stable  Not in exacerbation  Continue Lasix oral daily        Chronic respiratory failure with hypoxia [facility])   Assessment & Plan     Patient has chronic respiratory failure secondary to COPD  He is on 2 L of oxygen all the time  Currently he is at his baseline      COPD (chronic obstructive pulmonary disease) (HCC)   Assessment & Plan     Currently at baseline  No exacerbation at this time  Continue Spiriva and albuterol      Hypertension   Assessment & Plan     Blood pressure is elevated secondary to pain  Will resume Norvasc, losartan, metoprolol with withholding parameters     Anticipated Length of Stay:  Patient will be admitted on an Inpatient basis with an anticipated length of stay of  more than 2 midnights     Justification for Hospital Stay:  Acute abdominal pain      Admission Orders:   IP    1/19  @     6685  Scheduled Meds:   Current Facility-Administered Medications:  acetaminophen 650 mg Oral Q6H PRN Haven Acosta MD    albuterol 2 puff Inhalation Q6H PRN Haven Acosta MD    albuterol 2 5 mg Nebulization Q6H PRN Haven Acosta MD    amLODIPine 10 mg Oral Daily Haven Acosta MD    aspirin 81 mg Oral Daily Haven Acosta MD    atorvastatin 10 mg Oral Daily Haven Acosta MD budesonide-formoterol 2 puff Inhalation BID Leigha Garcia MD    dextrose 5 % and sodium chloride 0 45 % 100 mL/hr Intravenous Continuous Leigha Garcia MD Last Rate: 100 mL/hr (01/20/19 0218)   docusate sodium 100 mg Oral BID Leigha Garcia MD    enoxaparin 40 mg Subcutaneous Daily Leigha Garcia MD    furosemide 20 mg Oral Daily Leigha Garcia MD    losartan 100 mg Oral Daily Leigha Garcia MD    metoprolol tartrate 50 mg Oral Q12H [address] Leigha Garcia MD    morphine injection 2 mg Intravenous Q4H PRN Leigha Garcia MD    ondansetron 4 mg Intravenous Q6H PRN Leigha Garcia MD    pantoprazole 40 mg Oral Daily Legiha Garcia MD    polyethylene glycol 17 g Oral Daily Leigha Garcia MD    tiotropium 18 mcg Inhalation Daily Leigha Garcia MD      Continuous Infusions:   dextrose 5 % and sodium chloride 0 45 % 100 mL/hr Last Rate: 100 mL/hr (01/20/19 0218)     PRN Meds:   acetaminophen    albuterol    albuterol    morphine injection    ondansetron     NPO  Cons  Surgery  IV  MSO4  PRN ( x1  1/19 and  X 2  1/20 thus far)  IV  zofran Prn ( x1  1/20 thus far)    Per  Surgery  Consult:  Incarcerated right inguinal hernia with small-bowel  This appears to be a chronic problem  There is fecalization in the incarcerated small bowel which is indicatives chronicity  His white count is normal   His vital signs are stable  At this point a trial of bowel rest with slow advancement is not unreasonable  The family and the patient would prefer he not undergo surgery since she recently had the endovascular repair of a thoracic aneurysm  Once again they are informed that conservative treatment at this time may be successful  If not then they have to seriously consider surgery since at that point there is no other option  We discussed the potential anesthesia for the surgery which could be local anesthesia with some IV sedation which would be much less stress on the patient than general anesthesia    We will follow the patient closely    PROGRESS NOTE  1/20  Incarcerated right inguinal hernia   Assessment & Plan     CT of the abdomen pelvis shows small bowel obstruction with bowel loop within the right inguinal hernia  Patient has history of having right inguinal hernia repair done over 8 years ago at [facility]  He is also recently had aortic aneurysm related surgery done  Will keep him NPO for now and place him on pain control  Will consult General surgery for evaluation  Family is concerned that he recently had endovascular repair done of his aortic aneurysm and do not want to have more surgeries  Explained to them that will watch for the 1st 24-48 hours and if no improvement will only then proceed with any surgical interventions     Will hold morphine for now and see if the pain returns  So far no flatus or bowel movement  He did have a slight syncopal episode earlier this morning  Will observe him for now  Abdominal pain   Assessment & Plan     Patient presented with acute abdominal pain secondary to small-bowel obstruction and possible right inguinal hernia incarcerated  Consult surgery  Keep him NPO except for meds and keep him on IV fluid hydration  Also placed on IV morphine for pain control     [address] [facility] Review Department  Phone: [phone]; Fax [phone]  [email or web address]  ATTENTION: Please call with any questions or concerns to [phone]  and carefully listen to the prompts so that you are directed to the right person  Send all requests for admission clinical reviews, approved or denied determinations and any other requests to fax [phone]   All voicemails are confidential

## 2019-01-20 NOTE — ASSESSMENT & PLAN NOTE
CT of the abdomen pelvis shows small bowel obstruction with bowel loop within the right inguinal hernia  Patient has history of having right inguinal hernia repair done over 8 years ago at [place]  He is also recently had aortic aneurysm related surgery done  Will keep him NPO for now and place him on pain control  Will consult General surgery for evaluation  Family is concerned that he recently had endovascular repair done of his aortic aneurysm and do not want to have more surgeries  Explained to them that will watch for the 1st 24-48 hours and if no improvement will only then proceed with any surgical interventions    Will hold morphine for now and see if the pain returns  So far no flatus or bowel movement  He did have a slight syncopal episode earlier this morning  Will observe him for now

## 2019-01-20 NOTE — CONSULTS
The patient is an 53-year-old  male who speaks minimal English who presented to the emergency room with acute abdominal pain  Pain started this morning  According to the family vomited a small amount  His last bowel movement was this morning  He came to the emergency room and a CT scan of the abdomen demonstrated a right inguinal hernia with some small intestine incarcerated in it  There was some proximally dilated small bowel consistent with a partial small-bowel obstruction with the etiology being the hernia  Consultation was obtained with Medicine and surgery in regards to this  He recently 12/27/2018 underwent endovascular repair of a thoracic aortic aneurysm at [facility]  Allergies:  Penicillin and lisinopril    Medical History :  coronary artery disease, BPH, asthma, thoracic aortic aneurysm, diverticulosis, hypertension, COPD, nephrolithiasis    Surgical history:  Bilateral inguinal hernia repair, endovascular repair of thoracic aneurysm (TEVAR)    Current medications please refer to chart  Physical exam:  Elderly  male speaks minimal [place]  He is in no apparent distress  Vital signs:  BP elevated 165/94  Temp 98 6° pulse 92, respiratory rate 17    Abdomen:  Slightly distended soft mild tenderness diffusely  Small hernia on the left  Right-sided hernia incarcerated  Minimally tender to palpation  Not reducible    Laboratory values:  H&H 13 and 43, WBC 8 4  Impression:  Incarcerated right inguinal hernia with small-bowel  This appears to be a chronic problem  There is fecalization in the incarcerated small bowel which is indicatives chronicity  His white count is normal   His vital signs are stable  At this point a trial of bowel rest with slow advancement is not unreasonable  The family and the patient would prefer he not undergo surgery since she recently had the endovascular repair of a thoracic aneurysm    Once again they are informed that conservative treatment at this time may be successful  If not then they have to seriously consider surgery since at that point there is no other option  We discussed the potential anesthesia for the surgery which could be local anesthesia with some IV sedation which would be much less stress on the patient than general anesthesia  We will follow the patient closely

## 2019-01-20 NOTE — PROGRESS NOTES
Notified per nursing patient had an unwitnessed syncopal episode reported by family at bedside  Patient had ambulated to the bathroom to attempt a bowel movement; when unsuccessful was assisted back to bed by family  Son at bedside assisted in translation as patient is primarily [address] [facility]  Per son at bedside, patient sat back in bed and "passed out" for a couple of seconds  Patient denies losing consciousness  Denies any chest pain/palpitations, shortness of breath, dizziness, or visual changes  Denies any weakness or numbness/tingling  Only complaint is his abdominal pain which he states is fairly unchanged since admission  Vital signs: HR 64, RR 18, /84, SpO2 93% on nasal cannula  Orthostatic BP negative  Blood sugar: 138    Physical exam:  · General:  Appears in no acute distress, awake alert and oriented x 4  · Cardiac: RRR, no murmurs/rubs/gallops  · Resp: CTA bilaterally  · Abd: distended but overall soft; generalized mild tenderness  · Neuro: CN II - XII grossly intact  5/5 upper and lower extremity strength bilaterally  Sensation grossly intact  Pupils equally round and reactive; 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

## 2019-01-21 ENCOUNTER — ANESTHESIA EVENT (INPATIENT)
Dept: PERIOP | Facility: HOSPITAL | Age: 84
DRG: 351 | End: 2019-01-21
Payer: COMMERCIAL

## 2019-01-21 ENCOUNTER — ANESTHESIA (INPATIENT)
Dept: PERIOP | Facility: HOSPITAL | Age: 84
DRG: 351 | End: 2019-01-21
Payer: COMMERCIAL

## 2019-01-21 ENCOUNTER — ANESTHESIA EVENT (OUTPATIENT)
Dept: MEDSURG UNIT | Facility: HOSPITAL | Age: 84
End: 2019-01-21

## 2019-01-21 ENCOUNTER — ANESTHESIA (OUTPATIENT)
Dept: MEDSURG UNIT | Facility: HOSPITAL | Age: 84
End: 2019-01-21

## 2019-01-21 PROBLEM — Z01.818 PREOP EXAM FOR INTERNAL MEDICINE: Status: ACTIVE | Noted: 2019-01-21

## 2019-01-21 PROBLEM — N28.1 ACQUIRED RENAL CYST OF LEFT KIDNEY: Status: ACTIVE | Noted: 2019-01-21

## 2019-01-21 PROCEDURE — C1781 MESH (IMPLANTABLE): HCPCS | Performed by: SPECIALIST

## 2019-01-21 PROCEDURE — 99232 SBSQ HOSP IP/OBS MODERATE 35: CPT | Performed by: SPECIALIST

## 2019-01-21 PROCEDURE — 0YU50JZ SUPPLEMENT RIGHT INGUINAL REGION WITH SYNTHETIC SUBSTITUTE, OPEN APPROACH: ICD-10-PCS | Performed by: SPECIALIST

## 2019-01-21 PROCEDURE — 49521 REREPAIR ING HERNIA BLOCKED: CPT | Performed by: SPECIALIST

## 2019-01-21 PROCEDURE — 99232 SBSQ HOSP IP/OBS MODERATE 35: CPT | Performed by: INTERNAL MEDICINE

## 2019-01-21 DEVICE — VENTRALEX ST HERNIA PATCH
Type: IMPLANTABLE DEVICE | Site: INGUINAL | Status: FUNCTIONAL
Brand: VENTRALEX ST HERNIA PATCH

## 2019-01-21 DEVICE — MESH HERNIA PROLENE 2.9MM: Type: IMPLANTABLE DEVICE | Site: INGUINAL | Status: FUNCTIONAL

## 2019-01-21 RX ORDER — OXYCODONE HYDROCHLORIDE AND ACETAMINOPHEN 5; 325 MG/1; MG/1
1 TABLET ORAL EVERY 4 HOURS PRN
Status: DISCONTINUED | OUTPATIENT
Start: 2019-01-21 | End: 2019-01-22

## 2019-01-21 RX ORDER — SODIUM CHLORIDE, SODIUM LACTATE, POTASSIUM CHLORIDE, CALCIUM CHLORIDE 600; 310; 30; 20 MG/100ML; MG/100ML; MG/100ML; MG/100ML
75 INJECTION, SOLUTION INTRAVENOUS CONTINUOUS
Status: DISCONTINUED | OUTPATIENT
Start: 2019-01-21 | End: 2019-01-22

## 2019-01-21 RX ORDER — CEFAZOLIN SODIUM 2 G/50ML
SOLUTION INTRAVENOUS AS NEEDED
Status: DISCONTINUED | OUTPATIENT
Start: 2019-01-21 | End: 2019-01-21 | Stop reason: SURG

## 2019-01-21 RX ORDER — LIDOCAINE HYDROCHLORIDE 10 MG/ML
INJECTION, SOLUTION INFILTRATION; PERINEURAL AS NEEDED
Status: DISCONTINUED | OUTPATIENT
Start: 2019-01-21 | End: 2019-01-21 | Stop reason: SURG

## 2019-01-21 RX ORDER — FENTANYL CITRATE/PF 50 MCG/ML
25 SYRINGE (ML) INJECTION
Status: DISCONTINUED | OUTPATIENT
Start: 2019-01-21 | End: 2019-01-21 | Stop reason: HOSPADM

## 2019-01-21 RX ORDER — FENTANYL CITRATE 50 UG/ML
INJECTION, SOLUTION INTRAMUSCULAR; INTRAVENOUS AS NEEDED
Status: DISCONTINUED | OUTPATIENT
Start: 2019-01-21 | End: 2019-01-21 | Stop reason: SURG

## 2019-01-21 RX ORDER — DIPHENHYDRAMINE HYDROCHLORIDE 50 MG/ML
12.5 INJECTION INTRAMUSCULAR; INTRAVENOUS ONCE
Status: DISCONTINUED | OUTPATIENT
Start: 2019-01-21 | End: 2019-01-21 | Stop reason: HOSPADM

## 2019-01-21 RX ORDER — OXYCODONE HYDROCHLORIDE AND ACETAMINOPHEN 5; 325 MG/1; MG/1
2 TABLET ORAL EVERY 4 HOURS PRN
Status: DISCONTINUED | OUTPATIENT
Start: 2019-01-21 | End: 2019-01-22

## 2019-01-21 RX ORDER — LIDOCAINE HYDROCHLORIDE 10 MG/ML
INJECTION, SOLUTION EPIDURAL; INFILTRATION; INTRACAUDAL; PERINEURAL AS NEEDED
Status: DISCONTINUED | OUTPATIENT
Start: 2019-01-21 | End: 2019-01-21 | Stop reason: HOSPADM

## 2019-01-21 RX ORDER — FENTANYL CITRATE/PF 50 MCG/ML
12.5 SYRINGE (ML) INJECTION
Status: DISCONTINUED | OUTPATIENT
Start: 2019-01-21 | End: 2019-01-21 | Stop reason: HOSPADM

## 2019-01-21 RX ORDER — KETOROLAC TROMETHAMINE 30 MG/ML
15 INJECTION, SOLUTION INTRAMUSCULAR; INTRAVENOUS EVERY 6 HOURS SCHEDULED
Status: DISCONTINUED | OUTPATIENT
Start: 2019-01-21 | End: 2019-01-21

## 2019-01-21 RX ORDER — PROPOFOL 10 MG/ML
INJECTION, EMULSION INTRAVENOUS CONTINUOUS PRN
Status: DISCONTINUED | OUTPATIENT
Start: 2019-01-21 | End: 2019-01-21 | Stop reason: SURG

## 2019-01-21 RX ORDER — DEXAMETHASONE SODIUM PHOSPHATE 4 MG/ML
4 INJECTION, SOLUTION INTRA-ARTICULAR; INTRALESIONAL; INTRAMUSCULAR; INTRAVENOUS; SOFT TISSUE ONCE AS NEEDED
Status: DISCONTINUED | OUTPATIENT
Start: 2019-01-21 | End: 2019-01-21 | Stop reason: HOSPADM

## 2019-01-21 RX ORDER — MAGNESIUM HYDROXIDE 1200 MG/15ML
LIQUID ORAL AS NEEDED
Status: DISCONTINUED | OUTPATIENT
Start: 2019-01-21 | End: 2019-01-21 | Stop reason: HOSPADM

## 2019-01-21 RX ORDER — KETOROLAC TROMETHAMINE 30 MG/ML
15 INJECTION, SOLUTION INTRAMUSCULAR; INTRAVENOUS EVERY 6 HOURS PRN
Status: DISCONTINUED | OUTPATIENT
Start: 2019-01-21 | End: 2019-01-23 | Stop reason: HOSPADM

## 2019-01-21 RX ORDER — BUPIVACAINE HYDROCHLORIDE 5 MG/ML
INJECTION, SOLUTION PERINEURAL AS NEEDED
Status: DISCONTINUED | OUTPATIENT
Start: 2019-01-21 | End: 2019-01-21 | Stop reason: HOSPADM

## 2019-01-21 RX ADMIN — LIDOCAINE HYDROCHLORIDE 50 MG: 10 INJECTION, SOLUTION INFILTRATION; PERINEURAL at 12:39

## 2019-01-21 RX ADMIN — BUDESONIDE AND FORMOTEROL FUMARATE DIHYDRATE 2 PUFF: 160; 4.5 AEROSOL RESPIRATORY (INHALATION) at 09:06

## 2019-01-21 RX ADMIN — ACETAMINOPHEN 650 MG: 325 TABLET ORAL at 00:38

## 2019-01-21 RX ADMIN — ONDANSETRON 4 MG: 2 SOLUTION INTRAMUSCULAR; INTRAVENOUS at 00:29

## 2019-01-21 RX ADMIN — METOPROLOL TARTRATE 50 MG: 50 TABLET ORAL at 09:07

## 2019-01-21 RX ADMIN — ASPIRIN 81 MG: 81 TABLET, COATED ORAL at 09:06

## 2019-01-21 RX ADMIN — PHENYLEPHRINE HYDROCHLORIDE 50 MCG/MIN: 10 INJECTION INTRAVENOUS at 13:09

## 2019-01-21 RX ADMIN — ATORVASTATIN CALCIUM 10 MG: 10 TABLET, FILM COATED ORAL at 09:18

## 2019-01-21 RX ADMIN — DEXTROSE AND SODIUM CHLORIDE 50 ML/HR: 5; 450 INJECTION, SOLUTION INTRAVENOUS at 21:46

## 2019-01-21 RX ADMIN — CEFAZOLIN SODIUM 2000 MG: 2 SOLUTION INTRAVENOUS at 12:48

## 2019-01-21 RX ADMIN — DOCUSATE SODIUM 100 MG: 100 CAPSULE, LIQUID FILLED ORAL at 09:07

## 2019-01-21 RX ADMIN — DEXTROSE AND SODIUM CHLORIDE 50 ML/HR: 5; 450 INJECTION, SOLUTION INTRAVENOUS at 09:06

## 2019-01-21 RX ADMIN — FENTANYL CITRATE 50 MCG: 50 INJECTION INTRAMUSCULAR; INTRAVENOUS at 14:24

## 2019-01-21 RX ADMIN — METOPROLOL TARTRATE 50 MG: 50 TABLET ORAL at 20:44

## 2019-01-21 RX ADMIN — PROPOFOL 50 MCG/KG/MIN: 10 INJECTION, EMULSION INTRAVENOUS at 12:39

## 2019-01-21 RX ADMIN — MORPHINE SULFATE 2 MG: 2 INJECTION, SOLUTION INTRAMUSCULAR; INTRAVENOUS at 17:59

## 2019-01-21 RX ADMIN — FENTANYL CITRATE 25 MCG: 50 INJECTION INTRAMUSCULAR; INTRAVENOUS at 14:10

## 2019-01-21 RX ADMIN — PANTOPRAZOLE SODIUM 40 MG: 40 TABLET, DELAYED RELEASE ORAL at 06:01

## 2019-01-21 RX ADMIN — ENOXAPARIN SODIUM 40 MG: 40 INJECTION SUBCUTANEOUS at 09:07

## 2019-01-21 RX ADMIN — DEXTROSE AND SODIUM CHLORIDE: 5; 450 INJECTION, SOLUTION INTRAVENOUS at 12:25

## 2019-01-21 RX ADMIN — FENTANYL CITRATE 25 MCG: 50 INJECTION INTRAMUSCULAR; INTRAVENOUS at 12:39

## 2019-01-21 RX ADMIN — OXYCODONE HYDROCHLORIDE AND ACETAMINOPHEN 2 TABLET: 5; 325 TABLET ORAL at 22:58

## 2019-01-21 RX ADMIN — TIOTROPIUM BROMIDE 18 MCG: 18 CAPSULE ORAL; RESPIRATORY (INHALATION) at 09:07

## 2019-01-21 RX ADMIN — POLYETHYLENE GLYCOL 3350 17 G: 17 POWDER, FOR SOLUTION ORAL at 09:07

## 2019-01-21 RX ADMIN — FUROSEMIDE 20 MG: 20 TABLET ORAL at 09:07

## 2019-01-21 NOTE — NURSING NOTE
Pt received back from PACU  Into 618, family at bedside, pt drowsy but arouseable, lungs decreased, abd tender with RLQ dressing c+d, hypo bs, denies nausea, will monitor

## 2019-01-21 NOTE — OP NOTE
OPERATIVE REPORT  PATIENT NAME: Brian Lockahrt    :    MRN: [id number]  Pt Location:  OR ROOM 07    SURGERY DATE: 2019    Surgeon(s) and Role:     * Mike Red MD - Primary    Preop Diagnosis:  Incarcerated right inguinal hernia [K40 30] recurrent  Small bowel obstruction ([address] ) [K56 609]    Post-Op Diagnosis Codes:     * Incarcerated right inguinal hernia [K40 30] recurrent     * Small bowel obstruction ([address] ) [K56 609]    Procedure(s) (LRB):  REPAIR HERNIA INGUINAL WITH MESH (Right) recurrent    Specimen(s):  * No specimens in log *    Estimated Blood Loss:   50 mL    Drains:       Anesthesia Type:   IV Sedation with Anesthesia    Operative Indications:  Incarcerated right inguinal hernia [K40 30]  Small bowel obstruction ([address] ) [K56 609]      Operative Findings:  A significant amount of fibrosis  Old mesh with permanent sutures  An indirect inguinal hernia and also a direct    Complications:   None    Procedure and Technique:  Patient brought to operating room and placed on the operating table in a supine position  Under IV sedation the right groin was shaved prepped and draped in usual sterile fashion  1% lidocaine was used to infiltrate the ilioinguinal nerve and also line of incision from her previously well-healed scar in the right groin  This was recurrent hernia that would incarcerated  15  Scalp blade was used make an incision directly over the old incision  The schedule septation tissues the Bovie  There was significant amount of scar tissue present  This was divided in direction of the incision  The difficult to tell exactly where we were secondary to the scar tissue  Ephraim's fascia was divided and dissection carried deeper  Care was taken not to injure anything since once again was difficult to identify exactly where we were secondary to the previous scar tissue  Proceeding cautiously the external weak aponeurosis was identified    This was infiltrated with some local anesthesia  Palpating deep it was felt that we could palpate the pubic tubercle  Have her no [place] was carefully opened with sharp dissection  The edges of the small opening were grasped and there was obvious mesh directly under the external oblique aponeurosis  The aponeurosis was carefully tedious lead dissected off of the mesh  We go proximally and then distally and inferiorly than superiorly where we can make the most progress  Old Prolene suture was identified in the aponeurosis  The mesh appeared to be a mesh patch and the aponeurosis was dissected off of this in the mesh was attempted to be removed from its place in the floor of the inguinal canal   The hernia was not identified immediately  Dissection was slow methodical and tedious  Eventually heading down toward the pubic tubercle the cord was searched for and was not immediately identifiable  Persistent slow dissection alternating from sharp, to blunt, to Bovie  It was felt to be the cord was identified and was quite fibrosed to the floor the canal near the pubic tubercle  We opened the cord in this area and there appeared to be an obvious direct inguinal hernia  This also appeared to be almost virgin territory  This appeared to be an indirect inguinal hernia there was preperitoneal fat protruding through this down to the hemiscrotum  This was mobilized along with the sac and replaced back into the hernial defect  Dissection identified a defect at has bikes triangle so at this point we have a direct inguinal hernia and also what appeared to be an indirect inguinal hernia that was most likely never repaired initially  The dissection was such that it cleared off the preperitoneal space from the defect more laterally  This was a fairly large defect  A piece of PHS mesh was obtained and was not quite large enough to fix repair    A large Ventralex was obtained and this passed beautifully through the defect and laid in a preperitoneal position  This was sutured in place with intermittent 2 0 Prolene sutures utilizing the old mesh inferiorly around the inguinal ligament and also the conjoined tendon above  This patched this defect quite well  At that point the Ventralex was then used to repair the direct inguinal hernia  The distal end was cut to fit  Few sutures of 2 0 Prolene were placed in this to keep in place  This covered the entire defect and also part of the previously placed mesh  There was generalized oozing from the entire placed at the long tedious dissection that took over 3 hours  After adequate hemostasis obtained the area was infiltrated with 0 5% Marcaine  Additional hemostasis  At that point the external oblique aponeurosis was reapproximated along with some of the mesh laterally to close inguinal canal   Ephraim's fascia was reapproximated with 3 0 Vicryl interrupted fashion  Skin closed for Monocryl in running subcuticular fashion  Benzoin Steri-Strips were applied  Estimated blood was minimal patient tolerated the procedure well he was delivered to the recovery room stable condition     I was present for the entire procedure    Patient Disposition:  PACU     SIGNATURE: Mónica Verde MD  DATE: January 21, 2019  TIME: 4:51 PM

## 2019-01-21 NOTE — ASSESSMENT & PLAN NOTE
Right greater than left inguinal hernias with small bowel obstruction/incarceration  Was reduced yesterday by surgery but now recurrent  Patient agreeable to have surgical repair today

## 2019-01-21 NOTE — ANESTHESIA PREPROCEDURE EVALUATION
Review of Systems/Medical History  Patient summary reviewed  Chart reviewed  No history of anesthetic complications     Cardiovascular  Exercise tolerance (METS): <4,  Hypertension , CAD , CAD status: obstructive, CHF , PVD,    Pulmonary  Smoker ex-smoker  Cumulative Pack Years: 28, COPD mild- PRN medicaiton , Asthma , Sleep apnea , Oxygen dependent ,        GI/Hepatic    GERD well controlled,   Comment: Apple juice at 08:00  NPO since     Kidney stones,        Endo/Other  Negative endo/other ROS      GYN       Hematology  Negative hematology ROS      Musculoskeletal       Neurology  Negative neurology ROS      Psychology           Physical Exam    Airway    Mallampati score: II  TM Distance: >3 FB  Neck ROM: limited     Dental   upper dentures,     Cardiovascular  Rate: normal, Cardiovascular exam normal    Pulmonary  Decreased breath sounds,     Other Findings  No resp distress at this hospitalyzation      Anesthesia Plan  ASA Score- 4     Anesthesia Type- IV sedation with anesthesia with ASA Monitors  Additional Monitors:   Airway Plan:     Comment: [place] only  Son is an excellent   Respiratoy cripple  Plan Factors-Patient not instructed to abstain from smoking on day of procedure  Patient did not smoke on day of surgery  Induction- intravenous  Postoperative Plan- Plan for postoperative opioid use  Informed Consent- Anesthetic plan and risks discussed with patient, spouse and son  I personally reviewed this patient with the CRNA  Discussed and agreed on the Anesthesia Plan with the CRNA  Oly Morrissey

## 2019-01-21 NOTE — ASSESSMENT & PLAN NOTE
Preoperative evaluation  Patient does have history of diastolic congestive heart failure and COPD on 2 L nasal cannula  No evidence of decompensation  Recently did have TEVAR by vascular and cardiac surgery    Currently acceptable risk to proceed with hernia repair

## 2019-01-21 NOTE — ANESTHESIA POSTPROCEDURE EVALUATION
Post-Op Assessment Note      CV Status:  Stable    Mental Status:  Alert and awake    Hydration Status:  Euvolemic    PONV Controlled:  Controlled    Airway Patency:  Patent    Post Op Vitals Reviewed: Yes          Staff: Anesthesiologist, CRNA           /69 (01/21/19 1700)    Temp      Pulse 68 (01/21/19 1700)   Resp 16 (01/21/19 1700)    SpO2 (!) 87 % (01/21/19 1700)

## 2019-01-21 NOTE — PROGRESS NOTES
Right inguinal hernia reduced yesterday  Unfortunately patient developed recurrent abdominal pain and vomiting overnight  On physical exam bowel incarcerated and reduced  Patient requests surgical repair of the hernia  Previously repaired in [place] apparently with mesh  Discussed with Medicine    Discussed with anesthesia    Informed consent obtained  Plan surgical repair recurrent right inguinal hernia with mesh under local with IV sedation ASAP

## 2019-01-21 NOTE — PROGRESS NOTES
Progress Note - Brandie Romero [birth year], 82 y o  male MRN: [id number]  Unit/Bed#: 5T -01 Encounter: [id number]  Primary Care Provider: Dayo Liu MD   Date and time admitted to hospital: 1/19/2019 11:29 AM        Assessment and Plan  * Incarcerated right inguinal hernia   Assessment & Plan    Right greater than left inguinal hernias with small bowel obstruction/incarceration  Was reduced yesterday by surgery but now recurrent  Patient agreeable to have surgical repair today  Preop exam for internal medicine   Assessment & Plan    Preoperative evaluation  Patient does have history of diastolic congestive heart failure and COPD on 2 L nasal cannula  No evidence of decompensation  Recently did have TEVAR by vascular and cardiac surgery  Currently acceptable risk to proceed with hernia repair     Acquired renal cyst of left kidney   Assessment & Plan    Should have dedicated imaging at later time     Chronic diastolic CHF (congestive heart failure) ([facility])   Assessment & Plan    Chronic diastolic congestive heart failure  Will stop IV fluids after surgery  Currently on furosemide  No evidence of decompensation     Chronic respiratory failure with hypoxia ([facility])   Assessment & Plan    Chronic respiratory failure with hypoxia secondary to COPD chronically on 2 L nasal cannula     COPD (chronic obstructive pulmonary disease) ([facility])   Assessment & Plan    Severe COPD without exacerbation  Oxygen dependent  Continue spiriva and symbicort     Hypertension   Assessment & Plan    Essential hypertension controlled on amlodipine losartan and metoprolol     VTE Pharmacologic Prophylaxis: Enoxaparin (Lovenox)    Patient Centered Rounds: I have performed bedside rounds with nursing staff today  Discussions with Specialists or Other Care Team Provider:  Surgery  Education and Discussions with Family / Patient:  Spouse and son    Time Spent for Care: 45 mins    More than 50% of total time spent on counseling and coordination of care as described above  Current Length of Stay: 2 day(s)  Current Patient Status: Inpatient     Certification Statement: The patient will continue to require additional inpatient hospital stay due to Incarcerated right inguinal hernia  Discharge Plan / Estimated Discharge Date:     Code Status: Level 1 - Full Code  ______________________________________________________________________________    Subjective:   Patient seen and examined  With current abdominal and inguinal pain over night  No chest pain or shortness of breath  Objective:   Vitals: Blood pressure 117/68, pulse 62, temperature (!) 96 9 °F (36 1 °C), temperature source Temporal, resp  rate 22, height 5' 3" (1 6 m), weight 71 9 kg (158 lb 8 2 oz), SpO2 94 %      Physical Exam:   General appearance: alert, appears stated age and cooperative  Head: atraumatic  Lungs: diminished breath sounds  Heart: regular rate and rhythm  Abdomen: soft, non-tender, positive bowel sounds   Back: negative  Extremities: extremities atraumatic, no cyanosis or edema  Neurologic: Grossly normal    Additional Data:   Labs:    Results from last 7 days  Lab Units 01/20/19  0458 01/19/19  1206   WBC Thousand/uL 8 40 8 40   HEMOGLOBIN g/dL 13 3* 13 7   HEMATOCRIT % 42 5 43 4   MCV fL 99 99   PLATELETS Thousands/uL 228 262       Results from last 7 days  Lab Units 01/20/19  0457 01/19/19  1253   SODIUM mmol/L 137 138   POTASSIUM mmol/L 4 4 5 5*   CHLORIDE mmol/L 97 99   CO2 mmol/L 36* 34*   ANION GAP mmol/L 4* 5   BUN mg/dL 12 20   CREATININE mg/dL 0 84 0 85   CALCIUM mg/dL 9 0 9 6   ALBUMIN g/dL  --  3 9   TOTAL BILIRUBIN mg/dL  --  1 00   ALK PHOS U/L  --  83   ALT U/L  --  35   AST U/L  --  38   EGFR ml/min/1 73sq m 82 82   GLUCOSE RANDOM mg/dL 138* 130*                        Results from last 7 days  Lab Units 01/20/19  0601   POC GLUCOSE mg/dl 138*           Results from last 7 days  Lab Units 01/20/19  0457   TSH 3RD GENERATON uIU/mL 0 585     * I Have Reviewed All Lab Data Listed Above  Cultures:           Imaging:  Imaging Reports Reviewed Today Include:   Procedure: Ct Abdomen Pelvis With Contrast  Result Date: 1/19/2019  Impression: The known small bowel containing right inguinal hernia now serves as a transition point for small bowel obstruction  Moderately dilated loops of small bowel proximal to the hernia  Indeterminate left renal lesion as previously described  Consider follow-up with nonurgent renal MRI The study was marked in EPIC for immediate notification  Workstation performed: [id number]     Scheduled Meds:  Current Facility-Administered Medications:  acetaminophen 650 mg Oral Q6H PRN Ty Solid, MD    albuterol 2 puff Inhalation Q6H PRN Ty Solid, MD    albuterol 2 5 mg Nebulization Q6H PRN Ty Solid, MD    amLODIPine 10 mg Oral Daily Ty Solid, MD    aspirin 81 mg Oral Daily Ty Solid, MD    atorvastatin 10 mg Oral Daily Ty Solid, MD    budesonide-formoterol 2 puff Inhalation BID Ty Solid, MD    dextrose 5 % and sodium chloride 0 45 % 50 mL/hr Intravenous Continuous Ty Solid, MD Last Rate: 50 mL/hr (01/21/19 0906)   docusate sodium 100 mg Oral BID Ty Solid, MD    enoxaparin 40 mg Subcutaneous Daily Ty Solid, MD    furosemide 20 mg Oral Daily Ty Solid, MD    losartan 100 mg Oral Daily Ty Solid, MD    metoprolol tartrate 50 mg Oral Q12H [facility] Ty Solid, MD    morphine injection 2 mg Intravenous Q4H PRN Ty Solid, MD    ondansetron 4 mg Intravenous Q6H PRN Ty Solid, MD    pantoprazole 40 mg Oral Daily Ty Solid, MD    polyethylene glycol 17 g Oral Daily Ty Solid, MD    tiotropium 18 mcg Inhalation Daily Ty Solid, MD [facility] Internal Medicine  Hospitalist    ** Please Note: This note has been constructed using a voice recognition system   **

## 2019-01-21 NOTE — ANESTHESIA PREPROCEDURE EVALUATION
Review of Systems/Medical History  Patient summary reviewed  Chart reviewed  No history of anesthetic complications     Cardiovascular  Exercise tolerance (METS): <4,  Hypertension , CAD , CAD status: obstructive, CHF compensated CHF, PVD (endovascular thoracic aneurysm repaired 12/18   old chant reviewed), DVT   Pulmonary  Smoker cigarette smoker and ex-smoker  Cumulative Pack Years: 36, COPD mild- PRN medicaiton , Asthma , well controlled/ stable , Sleep apnea , Oxygen dependent (hx of resp failure requiring intubation  No hx of laryngeal stenosis) ,        GI/Hepatic    GERD well controlled, GI malignancy (prior right hernia  No acute changes at present),   Comment: Apple juice at 08:00   NPO since     Kidney stones,        Endo/Other     GYN       Hematology    Thrombocytopenia (platlets OIK),    Musculoskeletal    Arthritis     Neurology  Negative neurology ROS      Psychology   Negative psychology ROS              Physical Exam    Airway    Mallampati score: II  TM Distance: >3 FB  Neck ROM: limited     Dental   upper dentures and lower dentures,     Cardiovascular      Pulmonary  Decreased breath sounds,     Other Findings        Anesthesia Plan  ASA Score- 4     Anesthesia Type- IV sedation with anesthesia with ASA Monitors  Additional Monitors:   Airway Plan:     Comment: Respiratory cripple    Easily intubated in past    Greek only  Son is an excellent   Plan Factors-Patient not instructed to abstain from smoking on day of procedure  Patient did not smoke on day of surgery  Induction- intravenous  Postoperative Plan- Plan for postoperative opioid use  Informed Consent- Anesthetic plan and risks discussed with patient and son  I personally reviewed this patient with the CRNA  Discussed and agreed on the Anesthesia Plan with the CRNA  Jaun Bates

## 2019-01-21 NOTE — NURSING NOTE
Pt called RN complaining of Costipation, nausea, pain and unable to urinated  Pt in bathroom trying to have a bowel movement  Pt assisted to bed when done in bathroom  Zofran given  Pt bladder scan for 11 cc  After bladder scan Pt stated he urinated while in bathroom  Will give Tylenol for pain  Pt offered PRN Morphine but declined  Will make primary RN aware of new development  Call bell in reach, Family at bedside  Will continue to monitor

## 2019-01-21 NOTE — ANESTHESIA POSTPROCEDURE EVALUATION
Post-Op Assessment Note      CV Status:  Stable    Mental Status:  Alert and awake    Hydration Status:  Euvolemic    PONV Controlled:  Controlled    Airway Patency:  Patent    Post Op Vitals Reviewed: Yes          Staff: CRNA           BP   95/53   Temp  99 4   Pulse  54   Resp   24   SpO2   94

## 2019-01-21 NOTE — ASSESSMENT & PLAN NOTE
Chronic diastolic congestive heart failure  Will stop IV fluids after surgery  Currently on furosemide    No evidence of decompensation

## 2019-01-21 NOTE — PLAN OF CARE
Problem: DISCHARGE PLANNING - CARE MANAGEMENT  Goal: Discharge to post-acute care or home with appropriate resources  INTERVENTIONS:  - Conduct assessment to determine patient/family and health care team treatment goals, and need for post-acute services based on payer coverage, community resources, and patient preferences, and barriers to discharge  - Address psychosocial, clinical, and financial barriers to discharge as identified in assessment in conjunction with the patient/family and health care team  - Arrange appropriate level of post-acute services according to patients   needs and preference and payer coverage in collaboration with the physician and health care team  - Communicate with and update the patient/family, physician, and health care team regarding progress on the discharge plan  - Arrange appropriate transportation to post-acute venues  Outcome: Progressing  Plan for this pt at this time is home with VNA, however pt may require s/t rehab upon discharge  SW continues to follow

## 2019-01-22 LAB
ANION GAP SERPL CALCULATED.3IONS-SCNC: 5 MMOL/L (ref 5–14)
BUN SERPL-MCNC: 14 MG/DL (ref 5–25)
CALCIUM SERPL-MCNC: 8.3 MG/DL (ref 8.4–10.2)
CHLORIDE SERPL-SCNC: 95 MMOL/L (ref 97–108)
CO2 SERPL-SCNC: 32 MMOL/L (ref 22–30)
CREAT SERPL-MCNC: 0.96 MG/DL (ref 0.7–1.5)
ERYTHROCYTE [DISTWIDTH] IN BLOOD BY AUTOMATED COUNT: 13.9 %
GFR SERPL CREATININE-BSD FRML MDRD: 74 ML/MIN/1.73SQ M
GLUCOSE SERPL-MCNC: 117 MG/DL (ref 70–99)
HCT VFR BLD AUTO: 41 % (ref 41–53)
HGB BLD-MCNC: 13.2 G/DL (ref 13.5–17.5)
MCH RBC QN AUTO: 32.4 PG (ref 26–34)
MCHC RBC AUTO-ENTMCNC: 32.3 G/DL (ref 31–36)
MCV RBC AUTO: 100 FL (ref 80–100)
PLATELET # BLD AUTO: 188 THOUSANDS/UL (ref 150–450)
PMV BLD AUTO: 6.8 FL (ref 8.9–12.7)
POTASSIUM SERPL-SCNC: 5 MMOL/L (ref 3.6–5)
RBC # BLD AUTO: 4.08 MILLION/UL (ref 4.5–5.9)
SODIUM SERPL-SCNC: 132 MMOL/L (ref 137–147)
WBC # BLD AUTO: 8.2 THOUSAND/UL (ref 4.5–11)

## 2019-01-22 PROCEDURE — 80048 BASIC METABOLIC PNL TOTAL CA: CPT | Performed by: PHYSICIAN ASSISTANT

## 2019-01-22 PROCEDURE — 99024 POSTOP FOLLOW-UP VISIT: CPT | Performed by: SPECIALIST

## 2019-01-22 PROCEDURE — 85027 COMPLETE CBC AUTOMATED: CPT | Performed by: PHYSICIAN ASSISTANT

## 2019-01-22 PROCEDURE — 99232 SBSQ HOSP IP/OBS MODERATE 35: CPT | Performed by: INTERNAL MEDICINE

## 2019-01-22 RX ORDER — FUROSEMIDE 20 MG/1
20 TABLET ORAL DAILY
Status: DISCONTINUED | OUTPATIENT
Start: 2019-01-22 | End: 2019-01-23 | Stop reason: HOSPADM

## 2019-01-22 RX ORDER — ACETAMINOPHEN 325 MG/1
650 TABLET ORAL EVERY 6 HOURS PRN
Status: DISCONTINUED | OUTPATIENT
Start: 2019-01-22 | End: 2019-01-23 | Stop reason: HOSPADM

## 2019-01-22 RX ORDER — OXYCODONE HYDROCHLORIDE AND ACETAMINOPHEN 5; 325 MG/1; MG/1
1 TABLET ORAL EVERY 4 HOURS PRN
Status: DISCONTINUED | OUTPATIENT
Start: 2019-01-22 | End: 2019-01-23 | Stop reason: HOSPADM

## 2019-01-22 RX ADMIN — KETOROLAC TROMETHAMINE 15 MG: 30 INJECTION, SOLUTION INTRAMUSCULAR; INTRAVENOUS at 10:43

## 2019-01-22 RX ADMIN — DOCUSATE SODIUM 100 MG: 100 CAPSULE, LIQUID FILLED ORAL at 18:49

## 2019-01-22 RX ADMIN — TIOTROPIUM BROMIDE 18 MCG: 18 CAPSULE ORAL; RESPIRATORY (INHALATION) at 10:40

## 2019-01-22 RX ADMIN — BUDESONIDE AND FORMOTEROL FUMARATE DIHYDRATE 2 PUFF: 160; 4.5 AEROSOL RESPIRATORY (INHALATION) at 10:40

## 2019-01-22 RX ADMIN — SODIUM CHLORIDE 500 ML: 9 INJECTION, SOLUTION INTRAVENOUS at 00:18

## 2019-01-22 RX ADMIN — POLYETHYLENE GLYCOL 3350 17 G: 17 POWDER, FOR SOLUTION ORAL at 10:40

## 2019-01-22 RX ADMIN — DOCUSATE SODIUM 100 MG: 100 CAPSULE, LIQUID FILLED ORAL at 10:41

## 2019-01-22 RX ADMIN — FUROSEMIDE 20 MG: 20 TABLET ORAL at 10:41

## 2019-01-22 RX ADMIN — PANTOPRAZOLE SODIUM 40 MG: 40 TABLET, DELAYED RELEASE ORAL at 06:01

## 2019-01-22 RX ADMIN — ENOXAPARIN SODIUM 40 MG: 40 INJECTION SUBCUTANEOUS at 10:40

## 2019-01-22 RX ADMIN — ATORVASTATIN CALCIUM 10 MG: 10 TABLET, FILM COATED ORAL at 10:41

## 2019-01-22 RX ADMIN — SODIUM CHLORIDE 250 ML: 9 INJECTION, SOLUTION INTRAVENOUS at 02:49

## 2019-01-22 RX ADMIN — OXYCODONE HYDROCHLORIDE AND ACETAMINOPHEN 1 TABLET: 5; 325 TABLET ORAL at 15:35

## 2019-01-22 RX ADMIN — ASPIRIN 81 MG: 81 TABLET, COATED ORAL at 10:41

## 2019-01-22 NOTE — ASSESSMENT & PLAN NOTE
Patient hypotensive overnight after oxycodone requiring IV fluid boluses  Will discontinue amlodipine and losartan for now    Needs furosemide for congestive heart failure

## 2019-01-22 NOTE — ASSESSMENT & PLAN NOTE
Chronic diastolic congestive heart failure  Patient required IV fluid boluses for hypotension  Continue to hold amlodipine and losartan    Change hold parameters for furosemide and give this morning

## 2019-01-22 NOTE — PROGRESS NOTES
Patient postop day 1  For repair recurrent, incarcerated, right inguinal hernia with mesh  Difficult case  No further abdominal pain  Patient with incisional pain but better than yesterday  Being treated with Percocet/Toradol  Systolic blood pressure 168  Other vital signs stable  Belly soft nontender  Incision dressing intact nontender  No evidence recurrence  Impression:  Doing well status post difficult repair of incarcerated recurrent right inguinal hernia with mesh  Plan:  Advance diet  Being diuresed by Medicine  Out of bed

## 2019-01-22 NOTE — PROGRESS NOTES
Progress Note - Colleen Shah [birth year], 82 y o  male MRN: [id number]    Unit/Bed#: 5T -01 Encounter: [id number]    Primary Care Provider: Lashell Lea MD   Date and time admitted to hospital: 1/19/2019 11:29 AM        Assessment and Plan  * Incarcerated right inguinal hernia   Assessment & Plan    Right greater than left inguinal hernias with small bowel obstruction/incarceration  Attempted bedside reduction was unsuccessful and patient underwent repair and mesh of right inguinal hernia  Acquired renal cyst of left kidney   Assessment & Plan    Should have dedicated imaging at later time     Chronic diastolic CHF (congestive heart failure) ([facility])   Assessment & Plan    Chronic diastolic congestive heart failure  Patient required IV fluid boluses for hypotension  Continue to hold amlodipine and losartan  Change hold parameters for furosemide and give this morning     Chronic respiratory failure with hypoxia ([facility])   Assessment & Plan    Chronic respiratory failure with hypoxia secondary to COPD chronically on 2 L nasal cannula     Thoracic aortic aneurysm [facility])   Assessment & Plan    Status post TEVAR     COPD (chronic obstructive pulmonary disease) ([facility])   Assessment & Plan    Severe COPD without exacerbation  Oxygen dependent  Continue spiriva and symbicort     Hypertension   Assessment & Plan    Patient hypotensive overnight after oxycodone requiring IV fluid boluses  Will discontinue amlodipine and losartan for now  Needs furosemide for congestive heart failure     VTE Pharmacologic Prophylaxis: Enoxaparin (Lovenox)    Patient Centered Rounds: I have performed bedside rounds with nursing staff today  Discussions with Specialists or Other Care Team Provider:   Education and Discussions with Family / Patient:  Family member at bedside    Time Spent for Care: 30 mins  More than 50% of total time spent on counseling and coordination of care as described above      Current Length of Stay: 3 day(s)  Current Patient Status: Inpatient     Certification Statement: The patient will continue to require additional inpatient hospital stay due to Incarcerated right inguinal hernia  Discharge Plan / Estimated Discharge Date:  Hopefully next 24-48 hours if blood pressure improves    Code Status: Level 1 - Full Code  ______________________________________________________________________________    Subjective:   Patient seen and examined  Patient had repair of inguinal hernia yesterday and was hypotensive overnight    Objective:   Vitals: Blood pressure 107/60, pulse 78, temperature 98 2 °F (36 8 °C), temperature source Temporal, resp  rate 16, height 5' 3" (1 6 m), weight 71 9 kg (158 lb 8 2 oz), SpO2 92 %      Physical Exam:   General appearance: alert, appears stated age and cooperative  Head: Normocephalic, without obvious abnormality, atraumatic  Lungs: Fine crackles bibasilar  Heart: regular rate and rhythm  Abdomen: soft, non-tender, positive bowel sounds   Back: negative, range of motion normal  Extremities: no ulcers, gangrene or trophic changes  Neurologic: Grossly normal    Additional Data:   Labs:    Results from last 7 days  Lab Units 01/22/19  0258 01/20/19  0458 01/19/19  1206   WBC Thousand/uL 8 20 8 40 8 40   HEMOGLOBIN g/dL 13 2* 13 3* 13 7   HEMATOCRIT % 41 0 42 5 43 4   MCV fL 100 99 99   PLATELETS Thousands/uL 188 228 262       Results from last 7 days  Lab Units 01/22/19  0258 01/20/19  0457 01/19/19  1253   SODIUM mmol/L 132* 137 138   POTASSIUM mmol/L 5 0 4 4 5 5*   CHLORIDE mmol/L 95* 97 99   CO2 mmol/L 32* 36* 34*   ANION GAP mmol/L 5 4* 5   BUN mg/dL 14 12 20   CREATININE mg/dL 0 96 0 84 0 85   CALCIUM mg/dL 8 3* 9 0 9 6   ALBUMIN g/dL  --   --  3 9   TOTAL BILIRUBIN mg/dL  --   --  1 00   ALK PHOS U/L  --   --  83   ALT U/L  --   --  35   AST U/L  --   --  38   EGFR ml/min/1 73sq m 74 82 82   GLUCOSE RANDOM mg/dL 117* 138* 130*                        Results from last 7 days  Lab Units 01/20/19  0601   POC GLUCOSE mg/dl 138*           Results from last 7 days  Lab Units 01/20/19  0457   TSH 3RD GENERATON uIU/mL 0 585     * I Have Reviewed All Lab Data Listed Above  Cultures:           Imaging:  Imaging Reports Reviewed Today Include:   Procedure: Ct Abdomen Pelvis With Contrast    Result Date: 1/19/2019  Narrative: CT ABDOMEN AND PELVIS WITH IV CONTRAST INDICATION:   lower abd pain - r/o incarcerated hernia  COMPARISON:  CTA chest abdomen pelvis 12/2/2018 TECHNIQUE:  CT examination of the abdomen and pelvis was performed  Axial, sagittal, and coronal 2D reformatted images were created from the source data and submitted for interpretation  Radiation dose length product (DLP) for this visit:  1087 mGy-cm   This examination, like all CT scans performed in the [facility], was performed utilizing techniques to minimize radiation dose exposure, including the use of iterative reconstruction and automated exposure control  IV Contrast:  100 mL of iohexol (OMNIPAQUE) Enteric Contrast:  Enteric contrast was administered  FINDINGS: ABDOMEN LOWER CHEST:  Partially imaged distal aortic stent  LIVER/BILIARY TREE:  Unremarkable  GALLBLADDER:  No calcified gallstones  No pericholecystic inflammatory change  SPLEEN:  Unremarkable  PANCREAS:  Unremarkable  ADRENAL GLANDS:  Unremarkable  KIDNEYS/URETERS:  Nonobstructive 1 mm to 4 mm bilateral renal calculi  No hydronephrosis no perinephric collection  Subcentimeter cortical hypodensities, likely cysts  The previously seen exophytic lesion off the left kidney is indeterminate as previously described  STOMACH AND BOWEL:  Diffuse small bowel dilation with an area of transition at a small bowel containing right inguinal hernia  Colonic diverticulosis without acute diverticulitis  APPENDIX:  No findings to suggest appendicitis  ABDOMINOPELVIC CAVITY:  No ascites or free intraperitoneal air  No lymphadenopathy   VESSELS:  Unremarkable for patient's age  PELVIS REPRODUCTIVE ORGANS:  Prostamegaly  URINARY BLADDER:  Unremarkable  ABDOMINAL WALL/INGUINAL REGIONS:  Unremarkable  OSSEOUS STRUCTURES:  No acute fracture or destructive osseous lesion  Impression: The known small bowel containing right inguinal hernia now serves as a transition point for small bowel obstruction  Moderately dilated loops of small bowel proximal to the hernia  Indeterminate left renal lesion as previously described  Consider follow-up with nonurgent renal MRI The study was marked in EPIC for immediate notification   Workstation performed: [id number]     Scheduled Meds:  Current Facility-Administered Medications:  acetaminophen 650 mg Oral Q6H PRN Tigre Craven MD    albuterol 2 puff Inhalation Q6H PRN Tigre Craven MD    albuterol 2 5 mg Nebulization Q6H PRN Tigre Craven MD    amLODIPine 10 mg Oral Daily Tigre Craven MD    aspirin 81 mg Oral Daily Tigre Craven MD    atorvastatin 10 mg Oral Daily Tigre Craven MD    budesonide-formoterol 2 puff Inhalation BID Tigre Craven MD    dextrose 5 % and sodium chloride 0 45 % 50 mL/hr Intravenous Continuous Tigre Craven MD Last Rate: 50 mL/hr (01/21/19 2146)   docusate sodium 100 mg Oral BID Tigre Craven MD    enoxaparin 40 mg Subcutaneous Daily Tigre Craven MD    furosemide 20 mg Oral Daily Tigre Craven MD    ketorolac 15 mg Intravenous Q6H PRN Jose Quinteros MD    lactated ringers 75 mL/hr Intravenous Continuous Suzettekel Black DO    losartan 100 mg Oral Daily Tigre Craven MD    metoprolol tartrate 50 mg Oral Q12H [facility] Tigre Craven MD    morphine injection 2 mg Intravenous Q4H PRN Tigre Craven MD    ondansetron 4 mg Intravenous Q6H PRN Tigre Craven MD    oxyCODONE-acetaminophen 1 tablet Oral Q4H PRN Jose Quinteros MD    oxyCODONE-acetaminophen 2 tablet Oral Q4H PRN Jose Quinteros MD    pantoprazole 40 mg Oral Daily Tigre Craven MD    polyethylene glycol 17 g Oral Daily Tigre Craven MD    tiotropium 18 mcg Inhalation Daily Tigre Craven MD DO [facility] Internal Medicine  Hospitalist    ** Please Note: This note has been constructed using a voice recognition system   **

## 2019-01-22 NOTE — NURSING NOTE
BP on dinamap L 85/49, R 85/47, Manually L 88/58  Dee Wiggins PA-C made aware  500 cc bolus ordered over 2 hrs and hung  At present Pt denies any more pain  Pt denies dizziness or feeling lightheaded  No other changes in assessment  Call bell in reach, will continue to monitor

## 2019-01-22 NOTE — NURSING NOTE
Pt with low urinary output this morning  Urine output 100 cc over last 8 hrs  Pt with total IV intake 950 cc  Pt bladder scan for 26 cc  Pt states having no urge to void  Madeleine Daly PA-C made aware  No new orders at this time  Will continue to monitor  Call bell in reach

## 2019-01-22 NOTE — PROGRESS NOTES
[facility] Anesthesia Note                    Vitals reviewed  Pt spirits satisfactory  PO tolerated   IV site fine   No new cardiac or resp changes  Pt has not been OOB yet    analgesia is  adequate without any opiate complications  Pt seem satisfied with care  Son as an  is not present  Case discussed with RN   Episode of hypotension in evening  Tx with small boluse of fluid  Crackles present this AM per RN   Hospitalist is present and evaluating the pt

## 2019-01-22 NOTE — ASSESSMENT & PLAN NOTE
Right greater than left inguinal hernias with small bowel obstruction/incarceration  Attempted bedside reduction was unsuccessful and patient underwent repair and mesh of right inguinal hernia

## 2019-01-22 NOTE — NURSING NOTE
Pt c/o 10/10 pain to R groin  10 mg Percocet PO given as per PRN order  No other changes in assessment  Call bell in reach, will continue to monitor

## 2019-01-23 VITALS
SYSTOLIC BLOOD PRESSURE: 119 MMHG | TEMPERATURE: 99.8 F | OXYGEN SATURATION: 91 % | WEIGHT: 158.51 LBS | HEIGHT: 63 IN | DIASTOLIC BLOOD PRESSURE: 59 MMHG | HEART RATE: 83 BPM | RESPIRATION RATE: 20 BRPM | BODY MASS INDEX: 28.09 KG/M2

## 2019-01-23 PROCEDURE — 99024 POSTOP FOLLOW-UP VISIT: CPT | Performed by: SPECIALIST

## 2019-01-23 PROCEDURE — 94640 AIRWAY INHALATION TREATMENT: CPT

## 2019-01-23 PROCEDURE — 94760 N-INVAS EAR/PLS OXIMETRY 1: CPT

## 2019-01-23 PROCEDURE — 99239 HOSP IP/OBS DSCHRG MGMT >30: CPT | Performed by: INTERNAL MEDICINE

## 2019-01-23 PROCEDURE — 94664 DEMO&/EVAL PT USE INHALER: CPT

## 2019-01-23 RX ORDER — LOSARTAN POTASSIUM 100 MG/1
50 TABLET ORAL DAILY
Qty: 30 TABLET | Refills: 0 | Status: ON HOLD
Start: 2019-01-23 | End: 2019-03-22 | Stop reason: SDUPTHER

## 2019-01-23 RX ORDER — OXYCODONE HYDROCHLORIDE 5 MG/1
5 TABLET ORAL EVERY 6 HOURS PRN
Qty: 20 TABLET | Refills: 0 | Status: SHIPPED | OUTPATIENT
Start: 2019-01-23 | End: 2019-02-14

## 2019-01-23 RX ADMIN — BUDESONIDE AND FORMOTEROL FUMARATE DIHYDRATE 2 PUFF: 160; 4.5 AEROSOL RESPIRATORY (INHALATION) at 08:39

## 2019-01-23 RX ADMIN — TIOTROPIUM BROMIDE 18 MCG: 18 CAPSULE ORAL; RESPIRATORY (INHALATION) at 08:39

## 2019-01-23 RX ADMIN — ASPIRIN 81 MG: 81 TABLET, COATED ORAL at 08:48

## 2019-01-23 RX ADMIN — DOCUSATE SODIUM 100 MG: 100 CAPSULE, LIQUID FILLED ORAL at 08:49

## 2019-01-23 RX ADMIN — ENOXAPARIN SODIUM 40 MG: 40 INJECTION SUBCUTANEOUS at 08:48

## 2019-01-23 RX ADMIN — FUROSEMIDE 20 MG: 20 TABLET ORAL at 08:49

## 2019-01-23 RX ADMIN — PANTOPRAZOLE SODIUM 40 MG: 40 TABLET, DELAYED RELEASE ORAL at 06:13

## 2019-01-23 RX ADMIN — KETOROLAC TROMETHAMINE: 30 INJECTION, SOLUTION INTRAMUSCULAR; INTRAVENOUS at 08:55

## 2019-01-23 RX ADMIN — METOPROLOL TARTRATE 50 MG: 50 TABLET ORAL at 08:48

## 2019-01-23 RX ADMIN — POLYETHYLENE GLYCOL 3350 17 G: 17 POWDER, FOR SOLUTION ORAL at 08:49

## 2019-01-23 RX ADMIN — KETOROLAC TROMETHAMINE 15 MG: 30 INJECTION, SOLUTION INTRAMUSCULAR; INTRAVENOUS at 01:35

## 2019-01-23 RX ADMIN — ATORVASTATIN CALCIUM 10 MG: 10 TABLET, FILM COATED ORAL at 08:48

## 2019-01-23 NOTE — ASSESSMENT & PLAN NOTE
Right greater than left inguinal hernias with small bowel obstruction/incarceration  Attempted bedside reduction was unsuccessful and patient underwent repair and mesh of right inguinal hernia  Has had bowel movement since procedure    Will be discharged with limited supply of oxycodone

## 2019-01-23 NOTE — NURSING NOTE
Pt denies any pain at present, Dressing remains C/D/I  Groin area and penis remains swollen  Pt offers no c/o at present  No acute distress noted  No changes from previous shift assessment  Son at bedside  Call bell in reach  Will continue to monitor

## 2019-01-23 NOTE — ASSESSMENT & PLAN NOTE
Patient hypotensive overnight after procedure requiring IV fluid boluses  Needs furosemide for congestive heart failure  Can continue metoprolol but decrease losartan to 50 mg  Advised to discontinue amlodipine

## 2019-01-23 NOTE — DISCHARGE SUMMARY
Discharge- Colleen Shah [birth year], 82 y o  male MRN: [id number]  Unit/Bed#: 5T -01 Encounter: [id number]  Primary Care Provider: Lashell Lea MD   Date and time admitted to hospital: 1/19/2019 11:29 AM        Admitting Provider:  Osmani Sanchez MD  Discharge Provider:  Mariela Doe DO  Admission Date: 1/19/2019       Discharge Date: 01/23/19   LOS: 4  Primary Care Physician at Discharge: Lashell Lea MD [address]:  Colleen Shah is a 82 y o  male who presented to the hospital with abdominal pain  In the emergency department he was found to have incarcerated right inguinal hernia  Of note he recently had TEVAR for aortic aneurysm at [facility]  Patient's hernia was reduced at bedside by surgery but recurred within 24 hr and the patient was taken to OR for repair/mesh  He has had bowel movements and pain is now controlled  He will need close follow-up with general surgery within one weeks time  Of note, he was hypotensive after procedure  Upon review he has been hypotensive even on arrival   Advised to discontinue amlodipine and decrease losartan to 50 mg daily  Can continue metoprolol and furosemide as previously taken given history of congestive heart failure  Case discussed at length with patient's son at time of discharge  All questions answered to satisfaction  DISCHARGE DIAGNOSES  * Incarcerated right inguinal hernia   Assessment & Plan    Right greater than left inguinal hernias with small bowel obstruction/incarceration  Attempted bedside reduction was unsuccessful and patient underwent repair and mesh of right inguinal hernia  Has had bowel movement since procedure    Will be discharged with limited supply of oxycodone     Acquired renal cyst of left kidney   Assessment & Plan    Should have dedicated imaging at later time     Chronic diastolic CHF (congestive heart failure) (HCC)   Assessment & Plan    Chronic diastolic congestive heart failure  Patient required IV fluid boluses for hypotension  Continue to hold amlodipine and losartan  Change hold parameters for furosemide and give this morning     Chronic respiratory failure with hypoxia [facility])   Assessment & Plan    Chronic respiratory failure with hypoxia secondary to COPD chronically on 2 L nasal cannula     Thoracic aortic aneurysm [facility])   Assessment & Plan    Status post TEVAR December 2018  Has follow-up with CT surgery Dr Serjio Sloan on January 31 2019     COPD (chronic obstructive pulmonary disease) [facility])   Assessment & Plan    Severe COPD without exacerbation  Oxygen dependent  Continue spiriva and symbicort     Hypertension   Assessment & Plan    Patient hypotensive overnight after procedure requiring IV fluid boluses  Needs furosemide for congestive heart failure  Can continue metoprolol but decrease losartan to 50 mg  Advised to discontinue amlodipine  Troy Carrington surgery    PROCEDURES PERFORMED  Right hernia inguinal with mesh  Date:  1/21/2019  Surgeon:  Dr Wesley Poe Abdomen Pelvis With Contrast  Result Date: 1/19/2019  Impression: The known small bowel containing right inguinal hernia now serves as a transition point for small bowel obstruction  Moderately dilated loops of small bowel proximal to the hernia  Indeterminate left renal lesion as previously described  Consider follow-up with nonurgent renal MRI The study was marked in EPIC for immediate notification   Workstation performed: [id number]       LABS  Results from last 7 days  Lab Units 01/22/19  0258 01/20/19  0458 01/19/19  1206   WBC Thousand/uL 8 20 8 40 8 40   HEMOGLOBIN g/dL 13 2* 13 3* 13 7   HEMATOCRIT % 41 0 42 5 43 4   MCV fL 100 99 99   PLATELETS Thousands/uL 188 228 262       Results from last 7 days  Lab Units 01/22/19  0258 01/20/19  0457 01/19/19  1253   SODIUM mmol/L 132* 137 138   POTASSIUM mmol/L 5 0 4 4 5 5*   CHLORIDE mmol/L 95* 97 99 CO2 mmol/L 32* 36* 34*   BUN mg/dL 14 12 20   CREATININE mg/dL 0 96 0 84 0 85   CALCIUM mg/dL 8 3* 9 0 9 6   ALBUMIN g/dL  --   --  3 9   TOTAL BILIRUBIN mg/dL  --   --  1 00   ALK PHOS U/L  --   --  83   ALT U/L  --   --  35   AST U/L  --   --  38   EGFR ml/min/1 73sq m 74 82 82   GLUCOSE RANDOM mg/dL 117* 138* 130*                    Results from last 7 days  Lab Units 01/20/19  0601   POC GLUCOSE mg/dl 138*           Results from last 7 days  Lab Units 01/20/19  0457   TSH 3RD GENERATON uIU/mL 0 585               Cultures:     Results from last 7 days  Lab Units 01/19/19  1326   COLOR UA  Yellow   CLARITY UA  Clear   SPEC GRAV UA  1 020   PH UA  6 5   LEUKOCYTES UA  Negative   NITRITE UA  Negative   GLUCOSE UA mg/dl Negative   KETONES UA mg/dl Negative   BILIRUBIN UA  Negative   BLOOD UA  Negative        Results from last 7 days  Lab Units 01/19/19  1326   RBC UA /hpf None Seen   WBC UA /hpf 0-1*   EPITHELIAL CELLS WET PREP /hpf Occasional   BACTERIA UA /hpf Occasional            PHYSICAL EXAM:  Vitals:   Blood Pressure: 119/59 (01/23/19 0723)  Pulse: 83 (01/23/19 0723)  Temperature: 99 8 °F (37 7 °C) (01/23/19 0723)  Temp Source: Temporal (01/23/19 0723)  Respirations: 20 (01/23/19 0723)  Height: 5' 3" (160 cm) (01/19/19 1841)  Weight - Scale: 71 9 kg (158 lb 8 2 oz) (01/19/19 1841)  SpO2: 91 % (01/23/19 0723)    General appearance: alert, appears stated age and cooperative  Head: Normocephalic, without obvious abnormality, atraumatic  Eyes: conjunctivae/corneas clear  PERRL, EOM's intact    Lungs: crackles bibasilar  Heart: regular rate and rhythm  Abdomen: soft, non-tender; bowel sounds normal; no masses,  no organomegaly  Back: range of motion normal  Extremities: significant varicosities  Neurologic: Grossly normal    Planned Re-admission: No  Discharge Disposition: Home    Test Results Pending at Discharge: Home  Incidental findings: Home    Medications   Please see After Visit Summary for reconciled discharge medications provided to patient and family  Scheduled medications prescribed upon discharge:  Oxycodone 5 mg ##20    Diet restrictions: Surgical diet until seen by General surgery  Activity restrictions: No strenuous activity  Discharge Condition: good    Outpatient Follow-Up  1  Ranjan Chang MD [address] [place] [address] [phone] - follow-up within one week  2  Izabela Nam MD general surgery  Follow-up within one week  3  Nicol Melvin MD CT surgery  Follow-up 1/31/2019 as scheduled    Code Status: Level 1 - Full Code  Discharge Statement   I spent 40 minutes discharging the patient  This time was spent on the day of discharge  Greater than 50% of total time was spent with the patient and / or family counseling and / or coordination of care  ** Please Note: This note has been constructed using a voice recognition system   **

## 2019-01-23 NOTE — PLAN OF CARE
DISCHARGE PLANNING     Discharge to home or other facility with appropriate resources Not Progressing        DISCHARGE PLANNING - CARE MANAGEMENT     Discharge to post-acute care or home with appropriate resources Not Progressing        INFECTION - ADULT     Absence or prevention of progression during hospitalization Not Progressing     Absence of fever/infection during neutropenic period Not Progressing        Knowledge Deficit     Patient/family/caregiver demonstrates understanding of disease process, treatment plan, medications, and discharge instructions Not Progressing        PAIN - ADULT     Verbalizes/displays adequate comfort level or baseline comfort level Not Progressing        Potential for Falls     Patient will remain free of falls Not Progressing        Prexisting or High Potential for Compromised Skin Integrity     Skin integrity is maintained or improved Not Progressing        SAFETY ADULT     Patient will remain free of falls Not Progressing     Maintain or return to baseline ADL function Not Progressing     Maintain or return mobility status to optimal level Not Progressing

## 2019-01-23 NOTE — ASSESSMENT & PLAN NOTE
Status post TEVAR December 2018   Has follow-up with CT surgery Dr Gaudencio Bacon on January 31 2019

## 2019-01-23 NOTE — DISCHARGE INSTR - AVS FIRST PAGE
1  Incarcerated inguinal hernia  Status post repair  Oxycodone as needed  2  Hypertension  Blood pressure actually low during hospitalization  Stop amlodipine  Take half tablet of losartan  Can continue metoprolol and furosemide as previously taken

## 2019-01-23 NOTE — NURSING NOTE
Discharge Note  Patient leaves for home in stable condition, afebrile with all personal items after patient, and wife verbalized understanding of discharge instructions  Is accompanied off unit via wheelchair with volunteer accompanied by son and wife

## 2019-01-23 NOTE — INCIDENTAL FINDINGS
The following findings require follow up:  Radiographic finding   Finding: Indeterminate left renal lesion as previously described    Consider follow-up with nonurgent renal MRI    Follow up required: MRI   Follow up should be done within 4-6 week(s)    Please notify the following clinician to assist with the follow up:   Dr Dave Gardiner MD

## 2019-01-23 NOTE — NURSING NOTE
On initial rounds patient in no apparent distress  Skin warm and dry to touch  Pleasant and cooperative  Medicated for pain as requested  Voids adequate amount of urine  Son stays with patient  Tolerating diet  Ambulates well by self with stand by assist  All safety maintained Will continue to monitor

## 2019-01-23 NOTE — PROGRESS NOTES
Looks fine  Complains of minimal incisional pain  Tolerating his diet  Voiding without difficulty  Had a bowel movement this morning  Denies any abdominal pain  His vital signs are stable he is afebrile (99 8)    Physical exam:  Elderly  male awake appropriate    Abdomen:  Soft nontender  Incision:  dressing removed , Steri-Strips in place, mild edema  No evidence of infection no evidence recurrence  Impression:  Improving status post repair of an incarcerated recurrent right inguinal hernia with SBO  Plan:  Continue ambulation  Analgesia p r n     Medical eval pending DC

## 2019-01-24 NOTE — UTILIZATION REVIEW
Notification of Discharge  This is a Notification of Discharge from our facility [address]  Please be advised that this patient has been discharge from our facility  Below you will find the admission and discharge date and time including the patients disposition  PRESENTATION DATE: 1/19/2019 11:29 AM  IP ADMISSION DATE: 1/19/19 1658  DISCHARGE DATE: 1/23/2019  2:10 PM  DISPOSITION: Home with [address] Utilization Review Department  Phone: [phone]; Fax [phone]  [email or web address]  ATTENTION: Please call with any questions or concerns to [phone]  and carefully listen to the prompts so that you are directed to the right person  Send all requests for admission clinical reviews, approved or denied determinations and any other requests to fax [phone]   All voicemails are confidential

## 2019-02-05 ENCOUNTER — OFFICE VISIT (OUTPATIENT)
Dept: SURGERY | Facility: CLINIC | Age: 84
End: 2019-02-05

## 2019-02-05 VITALS
TEMPERATURE: 97.8 F | HEIGHT: 63 IN | DIASTOLIC BLOOD PRESSURE: 90 MMHG | BODY MASS INDEX: 28 KG/M2 | WEIGHT: 158 LBS | SYSTOLIC BLOOD PRESSURE: 148 MMHG | HEART RATE: 79 BPM

## 2019-02-05 DIAGNOSIS — K40.31 RECURRENT INGUINAL HERNIA OF RIGHT SIDE WITH OBSTRUCTION: Primary | ICD-10-CM

## 2019-02-05 PROCEDURE — 99024 POSTOP FOLLOW-UP VISIT: CPT | Performed by: SPECIALIST

## 2019-02-05 RX ORDER — AMLODIPINE BESYLATE 10 MG/1
10 TABLET ORAL
Status: ON HOLD | COMMUNITY
Start: 2019-01-01 | End: 2020-06-16 | Stop reason: SDUPTHER

## 2019-02-05 NOTE — PROGRESS NOTES
Postop visit status post repair of recurrent right inguinal hernia with incarcerated small bowel and bowel obstruction  The patient was done [facility] under local anesthesia with IV sedation  He had recently undergone endovascular repair of a thoracic aneurysm  He came in with a small-bowel obstruction secondary to an incarcerated right inguinal hernia  Patient had a previous right inguinal hernia repair with mesh many years ago  His procedure to go over 3 hr was quite difficult  He did well was discharged about 48 hr after the surgery  She is here today for follow-up visit with his wife  Today he has absolutely no complaints whatsoever  His groin pain is gone  He is tolerating his diet  His bowels are moving  Admits to only minimal swelling in the area  Is also voiding well  Physical exam:  Elderly  male awake and alert in no distress    Abdomen:  Soft flat nontender  Right groin shows a well-healed scar no evidence of infection  No evidence recurrence hernia  He has excellent cosmetic result  Impression:  Doing well status post repair of recurrent right inguinal hernia with incarcerated small bowel  Plan: At this point he is discharged from the office and told to return here p r n  His wife is quite happy and thankful of his care  It was our pleasure

## 2019-02-08 ENCOUNTER — HOSPITAL ENCOUNTER (OUTPATIENT)
Dept: CT IMAGING | Facility: HOSPITAL | Age: 84
Discharge: HOME | End: 2019-02-08
Attending: THORACIC SURGERY (CARDIOTHORACIC VASCULAR SURGERY)
Payer: COMMERCIAL

## 2019-02-08 DIAGNOSIS — I71.2 ANEURYSM OF DESCENDING THORACIC AORTA (HCC): ICD-10-CM

## 2019-02-08 PROCEDURE — 71275 CT ANGIOGRAPHY CHEST: CPT

## 2019-02-08 PROCEDURE — 74174 CTA ABD&PLVS W/CONTRAST: CPT

## 2019-02-08 RX ADMIN — IOHEXOL 100 ML: 350 INJECTION, SOLUTION INTRAVENOUS at 18:15

## 2019-02-14 ENCOUNTER — OFFICE VISIT (OUTPATIENT)
Dept: CARDIAC SURGERY | Facility: CLINIC | Age: 84
End: 2019-02-14

## 2019-02-14 VITALS
TEMPERATURE: 97.4 F | RESPIRATION RATE: 12 BRPM | DIASTOLIC BLOOD PRESSURE: 72 MMHG | BODY MASS INDEX: 27.64 KG/M2 | OXYGEN SATURATION: 87 % | SYSTOLIC BLOOD PRESSURE: 156 MMHG | HEART RATE: 76 BPM | HEIGHT: 63 IN | WEIGHT: 156 LBS

## 2019-02-14 DIAGNOSIS — I71.9 DESCENDING AORTIC ANEURYSM (HCC): Primary | ICD-10-CM

## 2019-02-14 PROCEDURE — 99024 POSTOP FOLLOW-UP VISIT: CPT | Performed by: THORACIC SURGERY (CARDIOTHORACIC VASCULAR SURGERY)

## 2019-02-14 NOTE — PROGRESS NOTES
POST OP FOLLOW UP VISIT    Procedure: S/P thoracic endovascular aortic repair (TEVAR) , performed on 12/27/19    History: Russell Anderson is a 82y o  year old male who presents to our office today for routine follow up care from [facility]  He is accompanied by his son and wife  He is doing well  He as lost some weight and is walking without difficulty  He denies SOB, lightheadedness, angina, back pain, abdominal pain, limb pain or weakness  He actually underwent right inguinal hernia repair with mesh on 1/21/19 due to abdominal pain from an incarcerated right inguinal hernia  He has had follow up with general surgery and family medicine  Vital Signs:   Vitals:    02/14/19 1400 02/14/19 1434   BP: 156/78 156/72   BP Location: Left arm Right arm   Cuff Size: Adult    Pulse: 76    Resp: 12    Temp: (!) 97 4 °F (36 3 °C)    TempSrc: Oral    SpO2: (!) 87%    Weight: 70 8 kg (156 lb)    Height: 5' 3" (1 6 m)        Home Medications:   Prior to Admission medications    Medication Sig Start Date End Date Taking?  Authorizing Provider   acetaminophen (TYLENOL) 325 mg tablet Take 2 tablets (650 mg total) by mouth every 6 (six) hours as needed for fever (temperature greater than 101 F) 12/31/18  Yes Lucas Munoz PA-C   albuterol (2 5 mg/3 mL) 0 083 % nebulizer solution Take 1 vial (2 5 mg total) by nebulization every 6 (six) hours as needed for wheezing or shortness of breath 6/26/18  Yes Blayne Cline MD   albuterol (VENTOLIN HFA) 90 mcg/act inhaler Inhale 2 puffs 8/8/17  Yes Historical Provider, MD   amLODIPine (NORVASC) 10 mg tablet Take 10 mg by mouth 1/1/19  Yes Historical Provider, MD   aspirin (ECOTRIN LOW STRENGTH) 81 mg EC tablet Take 1 tablet (81 mg total) by mouth daily 1/1/19  Yes Lucas Munoz PA-C   atorvastatin (LIPITOR) 10 mg tablet Take 10 mg by mouth daily   Yes Historical Provider, MD   budesonide-formoterol (SYMBICORT) 160-4 5 mcg/act inhaler Inhale 2 puffs 2 (two) times a day Rinse mouth after use  Yes Historical Provider, MD   losartan (COZAAR) 100 MG tablet Take 0 5 tablets (50 mg total) by mouth daily 1/23/19  Yes Colt Bright DO   metoprolol tartrate (LOPRESSOR) 50 mg tablet Take 1 tablet (50 mg total) by mouth every 12 (twelve) hours 12/31/18  Yes Lucas Munoz PA-C   pantoprazole (PROTONIX) 40 mg tablet Take 1 tablet (40 mg total) by mouth daily 8/20/18  Yes Alexei Acuna MD   tiotropium [facility]) 18 mcg inhalation capsule Place 18 mcg into inhaler and inhale 9/18/18 9/18/19 Yes Historical Provider, MD   docusate sodium (COLACE) 100 mg capsule Take 1 capsule (100 mg total) by mouth 2 (two) times a day  Patient not taking: Reported on 2/5/2019 12/31/18 2/14/19  Jessiac Munoz PA-C   furosemide (LASIX) 20 mg tablet Take 1 tablet (20 mg total) by mouth daily  Patient not taking: Reported on 2/5/2019 1/1/19 2/14/19  Lucas Munoz PA-C   oxyCODONE (ROXICODONE) 5 mg immediate release tablet Take 1 tablet (5 mg total) by mouth every 6 (six) hours as needed for moderate pain Max Daily Amount: 20 mg  Patient not taking: Reported on 2/14/2019 1/23/19 2/14/19  Sunita Kemp DO   polyethylene glycol (MIRALAX) 17 g packet Take 17 g by mouth daily  Patient not taking: Reported on 2/5/2019 1/1/19 2/14/19  Lis Baeza PA-C       Physical Exam:  General: well developed, no acute distress  HEENT/NECK:  PERRLA  No jugular venous distention  Cardiac:Regular rate and rhythm, No murmurs rubs or gallops  Pulmonary:Breath sounds clear bilaterally  Abdomen:  Non-tender, Non-distended  Positive bowel sounds  Upper extremities: 2+ radial pulses; brisk capillary refill  Lower extremities: Extremities warm/dry  PT/DP pules 2+ bilaterally  1+ edema B/L  Incisions: groin sites well healed  Neuro: Alert and oriented X 3  Sensation is grossly intact  No focal deficits  Skin: Warm/Dry, without rashes or lesions      Lab Results:   Lab Results   Component Value Date    WBC 8 20 01/22/2019 HGB 13 2 (L) 01/22/2019    HCT 41 0 01/22/2019     01/22/2019     01/22/2019     Lab Results   Component Value Date    GLUCOSE 136 12/27/2018    CALCIUM 8 3 (L) 01/22/2019     03/01/2014    K 5 0 01/22/2019    CO2 32 (H) 01/22/2019    CL 95 (L) 01/22/2019    BUN 14 01/22/2019    CREATININE 0 96 01/22/2019     Lab Results   Component Value Date    HGBA1C 7 1 (H) 12/24/2018     Lab Results   Component Value Date    CKTOTAL 105 02/26/2014    TROPONINI <0 01 12/02/2018       Imaging Studies:     CTA c/a/p:  Stable appearance of thoracic aortic endovascular graft without endoleak and with a decrease in the aneurysmal sac  I have personally reviewed pertinent films in PACS    Assessment: Descending thoracic aortic aneurysm  S/P thoracic endovascular aortic repair;    Plan:     Eliza Daly continues to make good progress in his recovery following thoracic endovascular aortic repair  He is now 7 weeks post op  Incisions are well-healed  Weight and VS are stable  CTA c/a/p is stable without endoleak and with a decrease in size of the aneurysmal sac  His BP is elevated today  We will assist him in scheduling an appt with his cardiologist     Eliza Daly will return to our aortic clinic with repeat CTA c/a/p in 6 months for ongoing surveillance  Eliza Daly was comfortable with our recommendations and her questions were answered to her satisfaction      RAQUEL Candelaria  2/14/19

## 2019-02-14 NOTE — LETTER
February 14, 2019     Maggie Moraes MD  [address] [place] [address]    Patient: Winter Lucas   YOB: 1935   Date of Visit: 2/14/2019       Dear Dr Angelina Cavanaugh: Thank you for referring Winter Lucas to me for evaluation  Below are my notes for this consultation  If you have questions, please do not hesitate to call me  I look forward to following your patient along with you  Sincerely,        Radha Cooper MD        CC: No Recipients  RAQUEL Somers  2/14/2019  3:13 PM  Attested   POST OP FOLLOW UP VISIT    Procedure: S/P thoracic endovascular aortic repair (TEVAR) , performed on 12/27/19    History: Winter Lucas is a 82y o  year old male who presents to our office today for routine follow up care from [facility]  He is accompanied by his son and wife  He is doing well  He as lost some weight and is walking without difficulty  He denies SOB, lightheadedness, angina, back pain, abdominal pain, limb pain or weakness  He actually underwent right inguinal hernia repair with mesh on 1/21/19 due to abdominal pain from an incarcerated right inguinal hernia  He has had follow up with general surgery and family medicine  Vital Signs:   Vitals:    02/14/19 1400 02/14/19 1434   BP: 156/78 156/72   BP Location: Left arm Right arm   Cuff Size: Adult    Pulse: 76    Resp: 12    Temp: (!) 97 4 °F (36 3 °C)    TempSrc: Oral    SpO2: (!) 87%    Weight: 70 8 kg (156 lb)    Height: 5' 3" (1 6 m)        Home Medications:   Prior to Admission medications    Medication Sig Start Date End Date Taking?  Authorizing Provider   acetaminophen (TYLENOL) 325 mg tablet Take 2 tablets (650 mg total) by mouth every 6 (six) hours as needed for fever (temperature greater than 101 F) 12/31/18  Yes Lucas Munoz PA-C   albuterol (2 5 mg/3 mL) 0 083 % nebulizer solution Take 1 vial (2 5 mg total) by nebulization every 6 (six) hours as needed for wheezing or shortness of breath 6/26/18  Yes Glenny Mcnulty MD   albuterol (VENTOLIN HFA) 90 mcg/act inhaler Inhale 2 puffs 8/8/17  Yes Historical Provider, MD   amLODIPine (NORVASC) 10 mg tablet Take 10 mg by mouth 1/1/19  Yes Historical Provider, MD   aspirin (ECOTRIN LOW STRENGTH) 81 mg EC tablet Take 1 tablet (81 mg total) by mouth daily 1/1/19  Yes Lucas Munoz PA-C   atorvastatin (LIPITOR) 10 mg tablet Take 10 mg by mouth daily   Yes Historical Provider, MD   budesonide-formoterol (SYMBICORT) 160-4 5 mcg/act inhaler Inhale 2 puffs 2 (two) times a day Rinse mouth after use  Yes Historical Provider, MD   losartan (COZAAR) 100 MG tablet Take 0 5 tablets (50 mg total) by mouth daily 1/23/19  Yes Colt Bright,    metoprolol tartrate (LOPRESSOR) 50 mg tablet Take 1 tablet (50 mg total) by mouth every 12 (twelve) hours 12/31/18  Yes Lucas Munoz PA-C   pantoprazole (PROTONIX) 40 mg tablet Take 1 tablet (40 mg total) by mouth daily 8/20/18  Yes Glenny Mcnulty MD   tiotropium [facility]) 18 mcg inhalation capsule Place 18 mcg into inhaler and inhale 9/18/18 9/18/19 Yes Historical Provider, MD   docusate sodium (COLACE) 100 mg capsule Take 1 capsule (100 mg total) by mouth 2 (two) times a day  Patient not taking: Reported on 2/5/2019 12/31/18 2/14/19  Gera Munoz PA-C   furosemide (LASIX) 20 mg tablet Take 1 tablet (20 mg total) by mouth daily  Patient not taking: Reported on 2/5/2019 1/1/19 2/14/19  Lucas Munoz PA-C   oxyCODONE (ROXICODONE) 5 mg immediate release tablet Take 1 tablet (5 mg total) by mouth every 6 (six) hours as needed for moderate pain Max Daily Amount: 20 mg  Patient not taking: Reported on 2/14/2019 1/23/19 2/14/19  Eliazar Lynch DO   polyethylene glycol (MIRALAX) 17 g packet Take 17 g by mouth daily  Patient not taking: Reported on 2/5/2019 1/1/19 2/14/19  Kendra Mancuso PA-C       Physical Exam:  General: well developed, no acute distress  HEENT/NECK:  PERRLA    No jugular venous distention  Cardiac:Regular rate and rhythm, No murmurs rubs or gallops  Pulmonary:Breath sounds clear bilaterally  Abdomen:  Non-tender, Non-distended  Positive bowel sounds  Upper extremities: 2+ radial pulses; brisk capillary refill  Lower extremities: Extremities warm/dry  PT/DP pules 2+ bilaterally  1+ edema B/L  Incisions: groin sites well healed  Neuro: Alert and oriented X 3  Sensation is grossly intact  No focal deficits  Skin: Warm/Dry, without rashes or lesions  Lab Results:   Lab Results   Component Value Date    WBC 8 20 01/22/2019    HGB 13 2 (L) 01/22/2019    HCT 41 0 01/22/2019     01/22/2019     01/22/2019     Lab Results   Component Value Date    GLUCOSE 136 12/27/2018    CALCIUM 8 3 (L) 01/22/2019     03/01/2014    K 5 0 01/22/2019    CO2 32 (H) 01/22/2019    CL 95 (L) 01/22/2019    BUN 14 01/22/2019    CREATININE 0 96 01/22/2019     Lab Results   Component Value Date    HGBA1C 7 1 (H) 12/24/2018     Lab Results   Component Value Date    CKTOTAL 105 02/26/2014    TROPONINI <0 01 12/02/2018       Imaging Studies:     CTA c/a/p:  Stable appearance of thoracic aortic endovascular graft without endoleak and with a decrease in the aneurysmal sac  I have personally reviewed pertinent films in PACS    Assessment: Descending thoracic aortic aneurysm  S/P thoracic endovascular aortic repair;    Plan:     Rosette Mario continues to make good progress in his recovery following thoracic endovascular aortic repair  He is now 7 weeks post op  Incisions are well-healed  Weight and VS are stable  CTA c/a/p is stable without endoleak and with a decrease in size of the aneurysmal sac  His BP is elevated today  We will assist him in scheduling an appt with his cardiologist     Rosette Mario will return to our aortic clinic with repeat CTA c/a/p in 6 months for ongoing surveillance    Rosette Mario was comfortable with our recommendations and her questions were answered to her satisfaction  RAQUEL Frye  2/14/19  Attestation signed by Kenzie Castrejon MD at 2/14/2019  3:52 PM:  Patient seen and evaluated byRAQUEL / JEREMIE   I did not see the patient but I agree with the above assessment and plan    CTA shows normal POP changes, no endoleak, aneurysm sac decreasing in size

## 2019-03-12 ENCOUNTER — OFFICE VISIT (OUTPATIENT)
Dept: CARDIOLOGY CLINIC | Facility: CLINIC | Age: 84
End: 2019-03-12
Payer: COMMERCIAL

## 2019-03-12 VITALS
HEIGHT: 63 IN | SYSTOLIC BLOOD PRESSURE: 144 MMHG | DIASTOLIC BLOOD PRESSURE: 80 MMHG | HEART RATE: 72 BPM | BODY MASS INDEX: 27.46 KG/M2 | WEIGHT: 155 LBS

## 2019-03-12 DIAGNOSIS — I71.2 THORACIC AORTIC ANEURYSM WITHOUT RUPTURE (HCC): Primary | ICD-10-CM

## 2019-03-12 DIAGNOSIS — I10 ESSENTIAL HYPERTENSION: ICD-10-CM

## 2019-03-12 PROCEDURE — 99213 OFFICE O/P EST LOW 20 MIN: CPT | Performed by: INTERNAL MEDICINE

## 2019-03-12 NOTE — PROGRESS NOTES
Cardiology Follow Up    [facility]  8/94/5620  [id number]  [address] [facility] [address] [place] [address]    1  Thoracic aortic aneurysm without rupture ([facility] [address] )     2  Essential hypertension         Interval History:   Cardiology follow-up  Patient underwent Tevar this past December  Procedure was uncomplicated  Postoperative he developed an acute abdomen with incarcerated hernia that requires herniorrhaphy, he recover well from that as well  He has been compliant with low-sodium diet  Blood pressures been well control  Denies any chest pain or dyspnea  No orthopnea no PND  Compliant with low-cholesterol diet, recent lipid profile total cholesterol 130 with an LDL 68 on statin therapy medium intensity      Patient Active Problem List   Diagnosis    Hypertension    COPD (chronic obstructive pulmonary disease) ([facility] [address] )    Descending aortic aneurysm (HCC)    History of DVT (deep vein thrombosis)    Benign essential hypertension    Thoracic aortic aneurysm (HCC)    Chronic respiratory failure with hypoxia (HCC)    Varicose veins of both lower extremities with pain    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HCC)    Edema of both legs    Snoring    Thoracic aortic aneurysm without rupture ([facility] [address] )    Aneurysm, aorta, thoracic ([facility] [address] )    Acute on chronic respiratory failure with hypoxia and hypercapnia (HCC)    Leukocytosis    Thrombocytopenia (HCC)    Hypochloremia    Abdominal pain    Incarcerated right inguinal hernia    Chronic diastolic CHF (congestive heart failure) ([address] )    Acquired renal cyst of left kidney    Preop exam for internal medicine     Past Medical History:   Diagnosis Date    Appendicolith     Ascending aortic aneurysm (HCC)     3 7    Asthma     BPH (benign prostatic hyperplasia)     CAD (coronary artery disease)     noted on CT scan    COPD (chronic obstructive pulmonary disease) ([facility] [address] )     Descending thoracic aortic aneurysm (HCC)     Diverticulosis     Former tobacco use     GERD (gastroesophageal reflux disease)     History of DVT (deep vein thrombosis)     Left leg    History of transfusion     Hypertension     Inguinal hernia     right    Nephrolithiasis     Oxygen dependent     2LNC    Prostate calculus     PVD (peripheral vascular disease) (HCC)     Ulcer     Varicose vein of leg     b/l     Social History     Socioeconomic History    Marital status: /Civil Union     Spouse name: Not on file    Number of children: Not on file    Years of education: Not on file    Highest education level: Not on file   Occupational History    Not on file   Social Needs    Financial resource strain: Not on file    Food insecurity:     Worry: Not on file     Inability: Not on file    Transportation needs:     Medical: Not on file     Non-medical: Not on file   Tobacco Use    Smoking status: Former Smoker     Packs/day: 1 00     Years: 35 00     Pack years: 35 00     Start date:      Last attempt to quit:      Years since quittin 2    Smokeless tobacco: Never Used   Substance and Sexual Activity    Alcohol use: No    Drug use: No    Sexual activity: Never   Lifestyle    Physical activity:     Days per week: Not on file     Minutes per session: Not on file    Stress: Not on file   Relationships    Social connections:     Talks on phone: Not on file     Gets together: Not on file     Attends Mandaeism service: Not on file     Active member of club or organization: Not on file     Attends meetings of clubs or organizations: Not on file     Relationship status: Not on file    Intimate partner violence:     Fear of current or ex partner: Not on file     Emotionally abused: Not on file     Physically abused: Not on file     Forced sexual activity: Not on file   Other Topics Concern    Not on file   Social History Narrative    ** Merged History Encounter **           Family History   Problem Relation Age of Onset    Tuberculosis Mother     No Known Problems Father     Cancer Sister     Diabetes Family     Hypertension Family      Past Surgical History:   Procedure Laterality Date    CARDIAC SURGERY      ESOPHAGOGASTRODUODENOSCOPY      HERNIA REPAIR Right 1/21/2019    Procedure: REPAIR HERNIA INGUINAL WITH MESH;  Surgeon: Kyle Collins MD;  Location: [address] [place] [address] MAIN OR;  Service: General    INGUINAL HERNIA REPAIR Bilateral     IR TEVAR  12/27/2018    DC ENDOVASC TAA REINCL SUBCL N/A 12/27/2018    Procedure: TEVAR - endovascular thoracic aortic aneurysm repair;  Surgeon: Jake Cullen MD;  Location: [facility] OR;  Service: Vascular    THORACIC AORTIC ANEURYSM REPAIR  12/27/2018    VARICOSE VEIN SURGERY Bilateral     vein stripping       Current Outpatient Medications:     acetaminophen (TYLENOL) 325 mg tablet, Take 2 tablets (650 mg total) by mouth every 6 (six) hours as needed for fever (temperature greater than 101 F), Disp: 30 tablet, Rfl: 0    albuterol (2 5 mg/3 mL) 0 083 % nebulizer solution, Take 1 vial (2 5 mg total) by nebulization every 6 (six) hours as needed for wheezing or shortness of breath, Disp: 75 mL, Rfl: 1    albuterol (VENTOLIN HFA) 90 mcg/act inhaler, Inhale 2 puffs, Disp: , Rfl:     amLODIPine (NORVASC) 10 mg tablet, Take 10 mg by mouth, Disp: , Rfl:     aspirin (ECOTRIN LOW STRENGTH) 81 mg EC tablet, Take 1 tablet (81 mg total) by mouth daily, Disp: 30 tablet, Rfl: 2    atorvastatin (LIPITOR) 10 mg tablet, Take 10 mg by mouth daily, Disp: , Rfl:     budesonide-formoterol (SYMBICORT) 160-4 5 mcg/act inhaler, Inhale 2 puffs 2 (two) times a day Rinse mouth after use , Disp: , Rfl:     losartan (COZAAR) 100 MG tablet, Take 0 5 tablets (50 mg total) by mouth daily, Disp: 30 tablet, Rfl: 0    metoprolol tartrate (LOPRESSOR) 50 mg tablet, Take 1 tablet (50 mg total) by mouth every 12 (twelve) hours, Disp: 30 tablet, Rfl: 2    pantoprazole (PROTONIX) 40 mg tablet, Take 1 tablet (40 mg total) by mouth daily, Disp: 90 tablet, Rfl: 1    tiotropium (SPIRIVA) 18 mcg inhalation capsule, Place 18 mcg into inhaler and inhale, Disp: , Rfl:   Allergies   Allergen Reactions    Penicillins Hives, Itching and Rash    Lisinopril Rash     Side pains and rash        Labs:  Admission on 01/19/2019, Discharged on 01/23/2019   Component Date Value    WBC 01/19/2019 8 40     RBC 01/19/2019 4 37*    Hemoglobin 01/19/2019 13 7     Hematocrit 01/19/2019 43 4     MCV 01/19/2019 99     MCH 01/19/2019 31 4     MCHC 01/19/2019 31 6     RDW 01/19/2019 13 9     MPV 01/19/2019 7 2*    Platelets 34/24/6871 262     Neutrophils Relative 01/19/2019 78*    Lymphocytes Relative 01/19/2019 12*    Monocytes Relative 01/19/2019 8     Eosinophils Relative 01/19/2019 1     Basophils Relative 01/19/2019 1     Neutrophils Absolute 01/19/2019 6 50     Lymphocytes Absolute 01/19/2019 1 00     Monocytes Absolute 01/19/2019 0 70     Eosinophils Absolute 01/19/2019 0 10     Basophils Absolute 01/19/2019 0 10     Sodium 01/19/2019 138     Potassium 01/19/2019 5 5*    Chloride 01/19/2019 99     CO2 01/19/2019 34*    ANION GAP 01/19/2019 5     BUN 01/19/2019 20     Creatinine 01/19/2019 0 85     Glucose 01/19/2019 130*    Calcium 01/19/2019 9 6     AST 01/19/2019 38     ALT 01/19/2019 35     Alkaline Phosphatase 01/19/2019 83     Total Protein 01/19/2019 8 2     Albumin 01/19/2019 3 9     Total Bilirubin 01/19/2019 1 00     eGFR 01/19/2019 82     Color, UA 01/19/2019 Yellow     Clarity, UA 01/19/2019 Clear     Specific Gravity, UA 01/19/2019 1 020     pH, UA 01/19/2019 6 5     Leukocytes, UA 01/19/2019 Negative     Nitrite, UA 01/19/2019 Negative     Protein, UA 01/19/2019 15 (Trace)*    Glucose, UA 01/19/2019 Negative     Ketones, UA 01/19/2019 Negative     Bilirubin, UA 01/19/2019 Negative     Blood, UA 01/19/2019 Negative     UROBILINOGEN UA 01/19/2019 Negative     RBC, UA 01/19/2019 None Seen     WBC, UA 01/19/2019 0-1*    Epithelial Cells 01/19/2019 Occasional     Bacteria, UA 01/19/2019 Occasional     MUCUS THREADS 01/19/2019 Moderate*    TSH 3RD GENERATON 01/20/2019 0 585     Sodium 01/20/2019 137     Potassium 01/20/2019 4 4     Chloride 01/20/2019 97     CO2 01/20/2019 36*    ANION GAP 01/20/2019 4*    BUN 01/20/2019 12     Creatinine 01/20/2019 0 84     Glucose 01/20/2019 138*    Calcium 01/20/2019 9 0     eGFR 01/20/2019 82     WBC 01/20/2019 8 40     RBC 01/20/2019 4 30*    Hemoglobin 01/20/2019 13 3*    Hematocrit 01/20/2019 42 5     MCV 01/20/2019 99     MCH 01/20/2019 31 1     MCHC 01/20/2019 31 4     RDW 01/20/2019 13 8     MPV 01/20/2019 7 1*    Platelets 49/10/4109 228     Neutrophils Relative 01/20/2019 74*    Lymphocytes Relative 01/20/2019 12*    Monocytes Relative 01/20/2019 12*    Eosinophils Relative 01/20/2019 2     Basophils Relative 01/20/2019 0     Neutrophils Absolute 01/20/2019 6 20     Lymphocytes Absolute 01/20/2019 1 00     Monocytes Absolute 01/20/2019 1 00*    Eosinophils Absolute 01/20/2019 0 20     Basophils Absolute 01/20/2019 0 00     POC Glucose 01/20/2019 138*    Ventricular Rate 01/20/2019 71     Atrial Rate 01/20/2019 71     OK Interval 01/20/2019 162     QRSD Interval 01/20/2019 86     QT Interval 01/20/2019 394     QTC Interval 01/20/2019 428     P Axis 01/20/2019 52     QRS Axis 01/20/2019 55     T Wave Axis 01/20/2019 37     Ventricular Rate 01/19/2019 85     Atrial Rate 01/19/2019 85     OK Interval 01/19/2019 142     QRSD Interval 01/19/2019 82     QT Interval 01/19/2019 362     QTC Interval 01/19/2019 430     P Axis 01/19/2019 66     QRS Axis 01/19/2019 44     T Wave Axis 01/19/2019 42     Sodium 01/22/2019 132*    Potassium 01/22/2019 5 0     Chloride 01/22/2019 95*    CO2 01/22/2019 32*    ANION GAP 01/22/2019 5     BUN 01/22/2019 14     Creatinine 01/22/2019 0 96     Glucose 01/22/2019 117*    Calcium 01/22/2019 8 3*    eGFR 01/22/2019 74     WBC 01/22/2019 8 20     RBC 01/22/2019 4 08*    Hemoglobin 01/22/2019 13 2*    Hematocrit 01/22/2019 41 0     MCV 01/22/2019 100     MCH 01/22/2019 32 4     MCHC 01/22/2019 32 3     RDW 01/22/2019 13 9     Platelets 15/02/8404 188     MPV 01/22/2019 6 8*   Lab Requisition on 01/14/2019   Component Date Value    Sodium 01/14/2019 143     Potassium 01/14/2019 4 9     Chloride 01/14/2019 104     CO2 01/14/2019 37*    ANION GAP 01/14/2019 2*    BUN 01/14/2019 18     Creatinine 01/14/2019 1 05     Glucose 01/14/2019 112     Calcium 01/14/2019 9 2     eGFR 01/14/2019 66      Imaging: No results found  Review of Systems:  Review of Systems   Constitutional: Negative for activity change and fatigue  Eyes: Negative for visual disturbance  Respiratory: Negative for apnea, shortness of breath, wheezing and stridor  Cardiovascular: Negative for chest pain, palpitations and leg swelling  Gastrointestinal: Negative for abdominal pain  Hematological: Does not bruise/bleed easily  Physical Exam:  Physical Exam   Constitutional: No distress  Eyes: No scleral icterus  Neck: No JVD present  Cardiovascular: Normal rate, regular rhythm, normal heart sounds and intact distal pulses  Exam reveals no gallop and no friction rub  No murmur heard  Pulses symmetrical and synchronous   Pulmonary/Chest: Effort normal and breath sounds normal  No stridor  No respiratory distress  He has no wheezes  He has no rales  Neurological: He is alert  Skin: Skin is warm  He is not diaphoretic  Psychiatric: He has a normal mood and affect  Discussion/Summary:  Vascular disease, descending thoracic abdominal aneurysm status post endovascular repair follow-up CT revealed no significant endoleaks, some migration of the proximal edge of the stent    Ascending aorta is top normal at 37 mm  And he has dilated infrarenal aorta  Previously described aneurysm, however recent CT scan does not description of a size  Pharmacological stress test 2 years ago suggesting ischemia left systolic function was normal with stage I diastolic dysfunction and top normal pulmonary pressures suggested by Doppler criteria  No new recommendations, blood pressure management    This note was completed in part utilizing [employer] direct voice recognition software  Grammatical errors, random word insertion, spelling mistakes, and incomplete sentences may be an occasional consequence of the system secondary to software limitations, ambient noise and hardware issues  At the time of dictation, efforts were made to edit, clarify and /or correct errors  Please read the chart carefully and recognize, using context, where substitutions have occurred  If you have any questions or concerns about the context, text or information contained within the body of this dictation, please contact myself, the provider, for further clarification

## 2019-03-21 ENCOUNTER — HOSPITAL ENCOUNTER (OUTPATIENT)
Facility: HOSPITAL | Age: 84
Setting detail: OBSERVATION
Discharge: HOME | End: 2019-03-22
Attending: EMERGENCY MEDICINE | Admitting: INTERNAL MEDICINE
Payer: COMMERCIAL

## 2019-03-21 DIAGNOSIS — I71.2 THORACIC AORTIC ANEURYSM WITHOUT RUPTURE (HCC): ICD-10-CM

## 2019-03-21 DIAGNOSIS — I10 ACCELERATED ESSENTIAL HYPERTENSION: ICD-10-CM

## 2019-03-21 DIAGNOSIS — I10 UNCONTROLLED HYPERTENSION: Primary | ICD-10-CM

## 2019-03-21 PROCEDURE — 85027 COMPLETE CBC AUTOMATED: CPT | Performed by: EMERGENCY MEDICINE

## 2019-03-21 PROCEDURE — 84484 ASSAY OF TROPONIN QUANT: CPT | Performed by: EMERGENCY MEDICINE

## 2019-03-21 PROCEDURE — 93005 ELECTROCARDIOGRAM TRACING: CPT

## 2019-03-21 PROCEDURE — 99285 EMERGENCY DEPT VISIT HI MDM: CPT

## 2019-03-21 PROCEDURE — 80053 COMPREHEN METABOLIC PANEL: CPT | Performed by: EMERGENCY MEDICINE

## 2019-03-21 PROCEDURE — 85610 PROTHROMBIN TIME: CPT | Performed by: EMERGENCY MEDICINE

## 2019-03-21 PROCEDURE — 96374 THER/PROPH/DIAG INJ IV PUSH: CPT

## 2019-03-21 PROCEDURE — 85007 BL SMEAR W/DIFF WBC COUNT: CPT | Performed by: EMERGENCY MEDICINE

## 2019-03-21 PROCEDURE — 85730 THROMBOPLASTIN TIME PARTIAL: CPT | Performed by: EMERGENCY MEDICINE

## 2019-03-21 PROCEDURE — 36415 COLL VENOUS BLD VENIPUNCTURE: CPT | Performed by: EMERGENCY MEDICINE

## 2019-03-21 RX ORDER — LABETALOL 20 MG/4 ML (5 MG/ML) INTRAVENOUS SYRINGE
20 ONCE
Status: COMPLETED | OUTPATIENT
Start: 2019-03-21 | End: 2019-03-21

## 2019-03-21 RX ADMIN — LABETALOL 20 MG/4 ML (5 MG/ML) INTRAVENOUS SYRINGE 20 MG: at 23:43

## 2019-03-22 ENCOUNTER — TELEPHONE (OUTPATIENT)
Dept: OTHER | Facility: OTHER | Age: 84
End: 2019-03-22

## 2019-03-22 ENCOUNTER — APPOINTMENT (EMERGENCY)
Dept: RADIOLOGY | Facility: HOSPITAL | Age: 84
End: 2019-03-22
Payer: COMMERCIAL

## 2019-03-22 ENCOUNTER — APPOINTMENT (OUTPATIENT)
Dept: NON INVASIVE DIAGNOSTICS | Facility: HOSPITAL | Age: 84
End: 2019-03-22
Payer: COMMERCIAL

## 2019-03-22 VITALS
HEIGHT: 63 IN | RESPIRATION RATE: 20 BRPM | OXYGEN SATURATION: 92 % | SYSTOLIC BLOOD PRESSURE: 118 MMHG | HEART RATE: 57 BPM | WEIGHT: 153.66 LBS | DIASTOLIC BLOOD PRESSURE: 58 MMHG | TEMPERATURE: 98.2 F | BODY MASS INDEX: 27.23 KG/M2

## 2019-03-22 PROBLEM — M79.89 LEFT LEG SWELLING: Status: ACTIVE | Noted: 2019-03-22

## 2019-03-22 LAB
ALBUMIN SERPL BCP-MCNC: 4.1 G/DL (ref 3–5.2)
ALBUMIN SERPL BCP-MCNC: 4.5 G/DL (ref 3–5.2)
ALP SERPL-CCNC: 80 U/L (ref 43–122)
ALP SERPL-CCNC: 80 U/L (ref 43–122)
ALT SERPL W P-5'-P-CCNC: 22 U/L (ref 9–52)
ALT SERPL W P-5'-P-CCNC: 22 U/L (ref 9–52)
ANION GAP SERPL CALCULATED.3IONS-SCNC: 4 MMOL/L (ref 5–14)
ANION GAP SERPL CALCULATED.3IONS-SCNC: 6 MMOL/L (ref 5–14)
APTT PPP: 30 SECONDS (ref 23–34)
AST SERPL W P-5'-P-CCNC: 25 U/L (ref 17–59)
AST SERPL W P-5'-P-CCNC: 28 U/L (ref 17–59)
ATRIAL RATE: 60 BPM
BILIRUB SERPL-MCNC: 0.5 MG/DL
BILIRUB SERPL-MCNC: 0.5 MG/DL
BILIRUB UR QL STRIP: NEGATIVE
BUN SERPL-MCNC: 13 MG/DL (ref 5–25)
BUN SERPL-MCNC: 16 MG/DL (ref 5–25)
CALCIUM SERPL-MCNC: 9.6 MG/DL (ref 8.4–10.2)
CALCIUM SERPL-MCNC: 9.7 MG/DL (ref 8.4–10.2)
CHLORIDE SERPL-SCNC: 98 MMOL/L (ref 97–108)
CHLORIDE SERPL-SCNC: 99 MMOL/L (ref 97–108)
CLARITY UR: CLEAR
CO2 SERPL-SCNC: 36 MMOL/L (ref 22–30)
CO2 SERPL-SCNC: 38 MMOL/L (ref 22–30)
COLOR UR: NORMAL
CREAT SERPL-MCNC: 0.81 MG/DL (ref 0.7–1.5)
CREAT SERPL-MCNC: 0.88 MG/DL (ref 0.7–1.5)
EOSINOPHIL # BLD AUTO: 1.75 THOUSAND/UL (ref 0–0.4)
EOSINOPHIL NFR BLD MANUAL: 23 % (ref 0–6)
ERYTHROCYTE [DISTWIDTH] IN BLOOD BY AUTOMATED COUNT: 14 %
GFR SERPL CREATININE-BSD FRML MDRD: 81 ML/MIN/1.73SQ M
GFR SERPL CREATININE-BSD FRML MDRD: 83 ML/MIN/1.73SQ M
GLUCOSE SERPL-MCNC: 126 MG/DL (ref 70–99)
GLUCOSE SERPL-MCNC: 98 MG/DL (ref 70–99)
GLUCOSE UR STRIP-MCNC: NEGATIVE MG/DL
HCT VFR BLD AUTO: 47 % (ref 41–53)
HGB BLD-MCNC: 14.8 G/DL (ref 13.5–17.5)
HGB UR QL STRIP.AUTO: NEGATIVE
INR PPP: 1.03 (ref 0.89–1.1)
KETONES UR STRIP-MCNC: NEGATIVE MG/DL
LEUKOCYTE ESTERASE UR QL STRIP: NEGATIVE
LYMPHOCYTES # BLD AUTO: 1.9 THOUSAND/UL (ref 0.5–4)
LYMPHOCYTES # BLD AUTO: 25 % (ref 25–45)
MCH RBC QN AUTO: 31.3 PG (ref 26–34)
MCHC RBC AUTO-ENTMCNC: 31.5 G/DL (ref 31–36)
MCV RBC AUTO: 99 FL (ref 80–100)
MONOCYTES # BLD AUTO: 0.68 THOUSAND/UL (ref 0.2–0.9)
MONOCYTES NFR BLD AUTO: 9 % (ref 1–10)
NEUTS SEG # BLD: 3.27 THOUSAND/UL (ref 1.8–7.8)
NEUTS SEG NFR BLD AUTO: 43 %
NITRITE UR QL STRIP: NEGATIVE
P AXIS: 51 DEGREES
PH UR STRIP.AUTO: 7 [PH]
PLATELET # BLD AUTO: 172 THOUSANDS/UL (ref 150–450)
PLATELET BLD QL SMEAR: ADEQUATE
PMV BLD AUTO: 7.3 FL (ref 8.9–12.7)
POTASSIUM SERPL-SCNC: 3.5 MMOL/L (ref 3.6–5)
POTASSIUM SERPL-SCNC: 4.2 MMOL/L (ref 3.6–5)
PR INTERVAL: 166 MS
PROT SERPL-MCNC: 7.6 G/DL (ref 5.9–8.4)
PROT SERPL-MCNC: 8.2 G/DL (ref 5.9–8.4)
PROT UR STRIP-MCNC: NEGATIVE MG/DL
PROTHROMBIN TIME: 10.9 SECONDS (ref 9.5–11.6)
QRS AXIS: 53 DEGREES
QRSD INTERVAL: 82 MS
QT INTERVAL: 426 MS
QTC INTERVAL: 426 MS
RBC # BLD AUTO: 4.73 MILLION/UL (ref 4.5–5.9)
RBC MORPH BLD: NORMAL
SODIUM SERPL-SCNC: 140 MMOL/L (ref 137–147)
SODIUM SERPL-SCNC: 141 MMOL/L (ref 137–147)
SP GR UR STRIP.AUTO: 1.01 (ref 1–1.04)
T WAVE AXIS: 51 DEGREES
TOTAL CELLS COUNTED SPEC: 100
TROPONIN I SERPL-MCNC: <0.01 NG/ML (ref 0–0.03)
UROBILINOGEN UA: NEGATIVE MG/DL
VENTRICULAR RATE: 60 BPM
WBC # BLD AUTO: 7.6 THOUSAND/UL (ref 4.5–11)

## 2019-03-22 PROCEDURE — RECHECK: Performed by: PHYSICIAN ASSISTANT

## 2019-03-22 PROCEDURE — 80053 COMPREHEN METABOLIC PANEL: CPT | Performed by: INTERNAL MEDICINE

## 2019-03-22 PROCEDURE — 94640 AIRWAY INHALATION TREATMENT: CPT

## 2019-03-22 PROCEDURE — 93971 EXTREMITY STUDY: CPT

## 2019-03-22 PROCEDURE — 93010 ELECTROCARDIOGRAM REPORT: CPT | Performed by: INTERNAL MEDICINE

## 2019-03-22 PROCEDURE — 99236 HOSP IP/OBS SAME DATE HI 85: CPT | Performed by: INTERNAL MEDICINE

## 2019-03-22 PROCEDURE — 93306 TTE W/DOPPLER COMPLETE: CPT

## 2019-03-22 PROCEDURE — RECHECK: Performed by: FAMILY MEDICINE

## 2019-03-22 PROCEDURE — 93306 TTE W/DOPPLER COMPLETE: CPT | Performed by: INTERNAL MEDICINE

## 2019-03-22 PROCEDURE — 71046 X-RAY EXAM CHEST 2 VIEWS: CPT

## 2019-03-22 PROCEDURE — 87081 CULTURE SCREEN ONLY: CPT | Performed by: INTERNAL MEDICINE

## 2019-03-22 PROCEDURE — 94760 N-INVAS EAR/PLS OXIMETRY 1: CPT

## 2019-03-22 PROCEDURE — 99203 OFFICE O/P NEW LOW 30 MIN: CPT | Performed by: INTERNAL MEDICINE

## 2019-03-22 RX ORDER — AMLODIPINE BESYLATE 10 MG/1
10 TABLET ORAL DAILY
Status: DISCONTINUED | OUTPATIENT
Start: 2019-03-22 | End: 2019-03-22 | Stop reason: HOSPADM

## 2019-03-22 RX ORDER — METOPROLOL TARTRATE 50 MG/1
50 TABLET, FILM COATED ORAL EVERY 12 HOURS SCHEDULED
Qty: 60 TABLET | Refills: 0 | Status: ON HOLD | OUTPATIENT
Start: 2019-03-22 | End: 2020-06-16 | Stop reason: SDUPTHER

## 2019-03-22 RX ORDER — BUDESONIDE AND FORMOTEROL FUMARATE DIHYDRATE 160; 4.5 UG/1; UG/1
2 AEROSOL RESPIRATORY (INHALATION) 2 TIMES DAILY
Status: DISCONTINUED | OUTPATIENT
Start: 2019-03-22 | End: 2019-03-22 | Stop reason: HOSPADM

## 2019-03-22 RX ORDER — SPIRONOLACTONE 25 MG/1
25 TABLET ORAL DAILY
Status: DISCONTINUED | OUTPATIENT
Start: 2019-03-22 | End: 2019-03-22 | Stop reason: HOSPADM

## 2019-03-22 RX ORDER — ATORVASTATIN CALCIUM 10 MG/1
10 TABLET, FILM COATED ORAL
Status: DISCONTINUED | OUTPATIENT
Start: 2019-03-22 | End: 2019-03-22 | Stop reason: HOSPADM

## 2019-03-22 RX ORDER — LABETALOL 20 MG/4 ML (5 MG/ML) INTRAVENOUS SYRINGE
10 EVERY 6 HOURS PRN
Status: DISCONTINUED | OUTPATIENT
Start: 2019-03-22 | End: 2019-03-22 | Stop reason: HOSPADM

## 2019-03-22 RX ORDER — HYDROCHLOROTHIAZIDE 12.5 MG/1
12.5 TABLET ORAL DAILY
Status: DISCONTINUED | OUTPATIENT
Start: 2019-03-22 | End: 2019-03-22 | Stop reason: HOSPADM

## 2019-03-22 RX ORDER — PANTOPRAZOLE SODIUM 40 MG/1
40 TABLET, DELAYED RELEASE ORAL DAILY
Status: DISCONTINUED | OUTPATIENT
Start: 2019-03-22 | End: 2019-03-22 | Stop reason: HOSPADM

## 2019-03-22 RX ORDER — HYDROCHLOROTHIAZIDE 12.5 MG/1
12.5 TABLET ORAL
Qty: 30 TABLET | Refills: 0 | Status: SHIPPED | OUTPATIENT
Start: 2019-03-22 | End: 2019-04-17 | Stop reason: SDUPTHER

## 2019-03-22 RX ORDER — HYDRALAZINE HYDROCHLORIDE 20 MG/ML
10 INJECTION INTRAMUSCULAR; INTRAVENOUS EVERY 4 HOURS PRN
Status: DISCONTINUED | OUTPATIENT
Start: 2019-03-22 | End: 2019-03-22 | Stop reason: HOSPADM

## 2019-03-22 RX ORDER — SPIRONOLACTONE 25 MG/1
25 TABLET ORAL
Qty: 30 TABLET | Refills: 0 | Status: SHIPPED | OUTPATIENT
Start: 2019-03-22 | End: 2019-04-17 | Stop reason: SDUPTHER

## 2019-03-22 RX ORDER — LOSARTAN POTASSIUM 50 MG/1
100 TABLET ORAL DAILY
Status: DISCONTINUED | OUTPATIENT
Start: 2019-03-22 | End: 2019-03-22 | Stop reason: HOSPADM

## 2019-03-22 RX ORDER — HYDROCODONE BITARTRATE AND ACETAMINOPHEN 5; 325 MG/1; MG/1
1 TABLET ORAL EVERY 4 HOURS PRN
Status: DISCONTINUED | OUTPATIENT
Start: 2019-03-22 | End: 2019-03-22 | Stop reason: HOSPADM

## 2019-03-22 RX ORDER — LOSARTAN POTASSIUM 100 MG/1
100 TABLET ORAL
Qty: 30 TABLET | Refills: 0 | Status: ON HOLD
Start: 2019-03-22 | End: 2020-06-16 | Stop reason: SDUPTHER

## 2019-03-22 RX ORDER — ASPIRIN 81 MG/1
81 TABLET ORAL DAILY
Status: DISCONTINUED | OUTPATIENT
Start: 2019-03-22 | End: 2019-03-22 | Stop reason: HOSPADM

## 2019-03-22 RX ORDER — METOPROLOL TARTRATE 50 MG/1
50 TABLET, FILM COATED ORAL EVERY 12 HOURS SCHEDULED
Qty: 30 TABLET | Refills: 0 | Status: SHIPPED | OUTPATIENT
Start: 2019-03-22 | End: 2019-03-22 | Stop reason: SDUPTHER

## 2019-03-22 RX ORDER — METOPROLOL TARTRATE 50 MG/1
50 TABLET, FILM COATED ORAL EVERY 12 HOURS SCHEDULED
Status: DISCONTINUED | OUTPATIENT
Start: 2019-03-22 | End: 2019-03-22 | Stop reason: HOSPADM

## 2019-03-22 RX ORDER — HEPARIN SODIUM 5000 [USP'U]/ML
5000 INJECTION, SOLUTION INTRAVENOUS; SUBCUTANEOUS EVERY 8 HOURS SCHEDULED
Status: DISCONTINUED | OUTPATIENT
Start: 2019-03-22 | End: 2019-03-22 | Stop reason: HOSPADM

## 2019-03-22 RX ORDER — ONDANSETRON 2 MG/ML
4 INJECTION INTRAMUSCULAR; INTRAVENOUS EVERY 4 HOURS PRN
Status: DISCONTINUED | OUTPATIENT
Start: 2019-03-22 | End: 2019-03-22 | Stop reason: HOSPADM

## 2019-03-22 RX ORDER — LEVALBUTEROL 1.25 MG/.5ML
1.25 SOLUTION, CONCENTRATE RESPIRATORY (INHALATION) EVERY 6 HOURS PRN
Status: DISCONTINUED | OUTPATIENT
Start: 2019-03-22 | End: 2019-03-22 | Stop reason: HOSPADM

## 2019-03-22 RX ADMIN — PANTOPRAZOLE SODIUM 40 MG: 40 TABLET, DELAYED RELEASE ORAL at 08:46

## 2019-03-22 RX ADMIN — AMLODIPINE BESYLATE 10 MG: 10 TABLET ORAL at 08:45

## 2019-03-22 RX ADMIN — LEVALBUTEROL HYDROCHLORIDE 1.25 MG: 1.25 SOLUTION, CONCENTRATE RESPIRATORY (INHALATION) at 04:52

## 2019-03-22 RX ADMIN — HYDROCHLOROTHIAZIDE 12.5 MG: 12.5 TABLET ORAL at 03:19

## 2019-03-22 RX ADMIN — TIOTROPIUM BROMIDE 18 MCG: 18 CAPSULE ORAL; RESPIRATORY (INHALATION) at 08:47

## 2019-03-22 RX ADMIN — BUDESONIDE AND FORMOTEROL FUMARATE DIHYDRATE 2 PUFF: 160; 4.5 AEROSOL RESPIRATORY (INHALATION) at 08:46

## 2019-03-22 RX ADMIN — METOPROLOL TARTRATE 50 MG: 50 TABLET ORAL at 08:46

## 2019-03-22 RX ADMIN — HYDRALAZINE HYDROCHLORIDE 10 MG: 20 INJECTION INTRAMUSCULAR; INTRAVENOUS at 03:19

## 2019-03-22 RX ADMIN — SPIRONOLACTONE 25 MG: 25 TABLET, FILM COATED ORAL at 11:29

## 2019-03-22 RX ADMIN — HEPARIN SODIUM 5000 UNITS: 5000 INJECTION INTRAVENOUS; SUBCUTANEOUS at 06:42

## 2019-03-22 RX ADMIN — LOSARTAN POTASSIUM 100 MG: 50 TABLET, FILM COATED ORAL at 08:46

## 2019-03-22 RX ADMIN — HEPARIN SODIUM 5000 UNITS: 5000 INJECTION INTRAVENOUS; SUBCUTANEOUS at 13:39

## 2019-03-22 RX ADMIN — ASPIRIN 81 MG: 81 TABLET, COATED ORAL at 08:46

## 2019-03-22 NOTE — TELEPHONE ENCOUNTER
[address] [facility] (approximated)  CONFIDENTIALTY NOTICE: This fax transmission is intended only for the addressee  It contains information that is legally privileged,  confidential or otherwise protected from use or disclosure  If you are not the intended recipient, you are strictly prohibited from reviewing,  disclosing, copying using or disseminating any of this information or taking any action in reliance on or regarding this information  If you have  received this fax in error, please notify us immediately by telephone so that we can arrange for its return to us  Page:   Call Id: [id number]  Health Call  Standard Call Report  Health Call  Patient Name: Leslie [facility]  Gender: Male  : (approximated)  Age:  Return Phone  Number:  Address:  City/State/Zip:  Practice Name: [address] [facility] [place] Charged:  Physician:  [facility] Name:  Relationship To  Patient:  Return Phone Number: Unavailable  Presenting Problem: Routine / / [address] / /  S  Fernandez  Service Type: Consults  Charged Service 1: N/A  Pharmacy Name and  Number:  Nurse Assessment  Nurse: Harley Carrington Date/Time: 3/22/2019 5:58:50 AM  Type of assessment required:  ---Consult  DateTime called Devyn Owens Name  ---2019 @ [phone] / / Daphne Harp of appointment:  ---Routine  Facility/Unit/Room Number/Unit Phone Number  ---[address] / /  tower / / Room # 662 / / [phone]  Practice consulted:  ---[facility]  Requesting physician:  ---Dr Gus Iraheta  Patient's name//Medical Record Number  ---Winter Lucas / / 83- / / # [id number]  Admitting diagnosis/Reason for consult  ---Accelerated essential hypertension/ Uncontrolled Hypertension  Physician Notified/DateTime:  ---Notified through [employer] / 2019 @ [phone] Cardiology (approximated)  CONFIDENTIALTY NOTICE: This fax transmission is intended only for the addressee   It contains information that is legally privileged,  confidential or otherwise protected from use or disclosure  If you are not the intended recipient, you are strictly prohibited from reviewing,  disclosing, copying using or disseminating any of this information or taking any action in reliance on or regarding this information  If you have  received this fax in error, please notify us immediately by telephone so that we can arrange for its return to us  Page: 2 of 2  Call Id: [id number]  Protocols  Protocol Title Nurse Date/Time  Disp   Time Disposition Final User  3/22/2019 6:03:54 AM Close Yes Woody Nguyễn  Comments  User: Rodri Villareal Date/Time: 3/22/2019 5:58:20 AM  03/22/2019 Notify through [address] [place] [address] Rd

## 2019-03-23 LAB — MRSA NOSE QL CULT: NORMAL

## 2019-03-24 PROCEDURE — 99024 POSTOP FOLLOW-UP VISIT: CPT | Performed by: SURGERY

## 2019-04-17 ENCOUNTER — OFFICE VISIT (OUTPATIENT)
Dept: CARDIOLOGY CLINIC | Facility: CLINIC | Age: 84
End: 2019-04-17
Payer: COMMERCIAL

## 2019-04-17 VITALS
WEIGHT: 154.6 LBS | SYSTOLIC BLOOD PRESSURE: 122 MMHG | DIASTOLIC BLOOD PRESSURE: 78 MMHG | OXYGEN SATURATION: 54 % | HEART RATE: 91 BPM | HEIGHT: 63 IN | BODY MASS INDEX: 27.39 KG/M2

## 2019-04-17 DIAGNOSIS — I71.9 DESCENDING AORTIC ANEURYSM (HCC): ICD-10-CM

## 2019-04-17 DIAGNOSIS — I10 BENIGN ESSENTIAL HYPERTENSION: Primary | ICD-10-CM

## 2019-04-17 DIAGNOSIS — I10 ACCELERATED ESSENTIAL HYPERTENSION: ICD-10-CM

## 2019-04-17 DIAGNOSIS — I50.32 CHRONIC DIASTOLIC CHF (CONGESTIVE HEART FAILURE) (HCC): ICD-10-CM

## 2019-04-17 PROCEDURE — 99213 OFFICE O/P EST LOW 20 MIN: CPT | Performed by: INTERNAL MEDICINE

## 2019-04-17 RX ORDER — SPIRONOLACTONE 25 MG/1
25 TABLET ORAL
Qty: 30 TABLET | Refills: 0 | Status: SHIPPED | OUTPATIENT
Start: 2019-04-17 | End: 2019-05-15 | Stop reason: SDUPTHER

## 2019-04-17 RX ORDER — HYDROCHLOROTHIAZIDE 12.5 MG/1
12.5 TABLET ORAL
Qty: 30 TABLET | Refills: 0 | Status: SHIPPED | OUTPATIENT
Start: 2019-04-17 | End: 2019-05-15

## 2019-05-09 NOTE — NURSING NOTE
Manual BP after 500 cc bolus 94/54  Malathi Sterling PA-C made aware  250 cc bolus over 2 hrs ordered and hung  Morning blood draws ordered for now  Will check H&H  Pt again with no pain at present  Call bell in reach, will continue to monitor  The patient is a 44y Female complaining of depression.

## 2019-05-15 DIAGNOSIS — I10 ACCELERATED ESSENTIAL HYPERTENSION: ICD-10-CM

## 2019-05-15 RX ORDER — SPIRONOLACTONE 25 MG/1
25 TABLET ORAL
Qty: 30 TABLET | Refills: 6 | Status: ON HOLD | OUTPATIENT
Start: 2019-05-15 | End: 2020-06-16

## 2019-05-15 RX ORDER — HYDROCHLOROTHIAZIDE 12.5 MG/1
12.5 TABLET ORAL
Qty: 30 TABLET | Refills: 6 | Status: ON HOLD | OUTPATIENT
Start: 2019-05-15 | End: 2020-06-16 | Stop reason: SDUPTHER

## 2019-05-31 DIAGNOSIS — I71.2 THORACIC AORTIC ANEURYSM WITHOUT RUPTURE (HCC): Primary | ICD-10-CM

## 2019-07-08 ENCOUNTER — HOSPITAL ENCOUNTER (OUTPATIENT)
Dept: NON INVASIVE DIAGNOSTICS | Facility: CLINIC | Age: 84
Discharge: HOME | End: 2019-07-08
Payer: COMMERCIAL

## 2019-07-08 DIAGNOSIS — I71.2 THORACIC AORTIC ANEURYSM WITHOUT RUPTURE (HCC): ICD-10-CM

## 2019-07-08 PROCEDURE — 93978 VASCULAR STUDY: CPT

## 2019-07-09 PROCEDURE — 93978 VASCULAR STUDY: CPT | Performed by: SURGERY

## 2019-07-15 DIAGNOSIS — E78.5 HYPERLIPIDEMIA, UNSPECIFIED HYPERLIPIDEMIA TYPE: Primary | ICD-10-CM

## 2019-07-15 RX ORDER — ATORVASTATIN CALCIUM 10 MG/1
10 TABLET, FILM COATED ORAL DAILY
Qty: 90 TABLET | Refills: 1 | Status: SHIPPED | OUTPATIENT
Start: 2019-07-15 | End: 2019-07-17

## 2019-07-17 ENCOUNTER — TELEPHONE (OUTPATIENT)
Dept: CARDIOLOGY CLINIC | Facility: CLINIC | Age: 84
End: 2019-07-17

## 2019-07-17 DIAGNOSIS — E78.5 HYPERLIPIDEMIA, UNSPECIFIED HYPERLIPIDEMIA TYPE: ICD-10-CM

## 2019-07-18 RX ORDER — ATORVASTATIN CALCIUM 10 MG/1
10 TABLET, FILM COATED ORAL DAILY
Qty: 90 TABLET | Refills: 1 | Status: ON HOLD | OUTPATIENT
Start: 2019-07-18 | End: 2020-06-16 | Stop reason: SDUPTHER

## 2019-08-19 ENCOUNTER — TELEPHONE (OUTPATIENT)
Dept: CARDIAC SURGERY | Facility: CLINIC | Age: 84
End: 2019-08-19

## 2019-08-31 ENCOUNTER — APPOINTMENT (OUTPATIENT)
Dept: LAB | Facility: HOSPITAL | Age: 84
End: 2019-08-31
Payer: COMMERCIAL

## 2019-08-31 DIAGNOSIS — I10 ACCELERATED ESSENTIAL HYPERTENSION: ICD-10-CM

## 2019-08-31 DIAGNOSIS — I71.2 THORACIC AORTIC ANEURYSM WITHOUT RUPTURE (HCC): ICD-10-CM

## 2019-08-31 DIAGNOSIS — I71.9 DESCENDING AORTIC ANEURYSM (HCC): ICD-10-CM

## 2019-08-31 LAB
ANION GAP SERPL CALCULATED.3IONS-SCNC: 7 MMOL/L (ref 4–13)
BUN SERPL-MCNC: 21 MG/DL (ref 5–25)
CALCIUM SERPL-MCNC: 9.8 MG/DL (ref 8.3–10.1)
CHLORIDE SERPL-SCNC: 103 MMOL/L (ref 100–108)
CO2 SERPL-SCNC: 36 MMOL/L (ref 21–32)
CREAT SERPL-MCNC: 1.04 MG/DL (ref 0.6–1.3)
GFR SERPL CREATININE-BSD FRML MDRD: 67 ML/MIN/1.73SQ M
GLUCOSE SERPL-MCNC: 91 MG/DL (ref 65–140)
POTASSIUM SERPL-SCNC: 4.6 MMOL/L (ref 3.5–5.3)
SODIUM SERPL-SCNC: 146 MMOL/L (ref 136–145)

## 2019-08-31 PROCEDURE — 80048 BASIC METABOLIC PNL TOTAL CA: CPT

## 2019-08-31 PROCEDURE — 36415 COLL VENOUS BLD VENIPUNCTURE: CPT

## 2019-09-01 ENCOUNTER — HOSPITAL ENCOUNTER (OUTPATIENT)
Dept: CT IMAGING | Facility: HOSPITAL | Age: 84
Discharge: HOME | End: 2019-09-01
Payer: COMMERCIAL

## 2019-09-01 DIAGNOSIS — I71.9 DESCENDING AORTIC ANEURYSM (HCC): ICD-10-CM

## 2019-09-01 PROCEDURE — 74174 CTA ABD&PLVS W/CONTRAST: CPT

## 2019-09-01 PROCEDURE — 71275 CT ANGIOGRAPHY CHEST: CPT

## 2019-09-01 RX ADMIN — IOHEXOL 100 ML: 350 INJECTION, SOLUTION INTRAVENOUS at 09:20

## 2019-09-19 ENCOUNTER — OFFICE VISIT (OUTPATIENT)
Dept: CARDIAC SURGERY | Facility: CLINIC | Age: 84
End: 2019-09-19
Payer: COMMERCIAL

## 2019-09-19 VITALS
BODY MASS INDEX: 26.58 KG/M2 | DIASTOLIC BLOOD PRESSURE: 74 MMHG | SYSTOLIC BLOOD PRESSURE: 142 MMHG | TEMPERATURE: 97.9 F | HEART RATE: 57 BPM | RESPIRATION RATE: 14 BRPM | WEIGHT: 150 LBS | OXYGEN SATURATION: 87 % | HEIGHT: 63 IN

## 2019-09-19 DIAGNOSIS — I71.9 DESCENDING AORTIC ANEURYSM (HCC): Primary | ICD-10-CM

## 2019-09-19 PROCEDURE — 99214 OFFICE O/P EST MOD 30 MIN: CPT | Performed by: PHYSICIAN ASSISTANT

## 2019-09-19 NOTE — PROGRESS NOTES
Consultation - Cardiothoracic Surgery   Dilip Beltran 82 y o  male MRN: [id number]    Reason for Consult / Principal Problem: f/u TEVAR 12/27/18    History of Present Illness: Dilip Beltran is a 82y o  year old male who presents today for 9 month follow up from his original TEVAR procedure in 12/27/2018  He states he has been doing well without any complaints  He denies any chest pain, SOB, dizziness, leg weakness, or decreased activity  He tolerates all of his ADLs independently  He is present with his son and wife today on examination  He did have an ER visit this past year for severe HTN  He has been following with outpatient cardiology for management  He is slightly HTN in the office today as well  He tells me he has been taking his medication as prescribed however doesn't consistently take the water pill  He states it "makes him pee too much "  He unfortunately does not follow a low salt diet either  He has lost weight since his procedure and has been more active  He follows with Dr Chaparro Thao routinely           Past Medical History:  Past Medical History:   Diagnosis Date    Appendicolith     Ascending aortic aneurysm ([facility])     3 7    Asthma     BPH (benign prostatic hyperplasia)     CAD (coronary artery disease)     noted on CT scan    COPD (chronic obstructive pulmonary disease) ([facility])     Descending thoracic aortic aneurysm (HCC)     Diverticulosis     Former tobacco use     GERD (gastroesophageal reflux disease)     History of DVT (deep vein thrombosis)     Left leg    History of transfusion     Hypertension     Inguinal hernia     right    Nephrolithiasis     Oxygen dependent     2LNC    Prostate calculus     PVD (peripheral vascular disease) ([facility])     Ulcer     Varicose vein of leg     b/l     Past Surgical History:   Past Surgical History:   Procedure Laterality Date    CARDIAC SURGERY      ESOPHAGOGASTRODUODENOSCOPY      HERNIA REPAIR Right 1/21/2019 Procedure: REPAIR HERNIA INGUINAL WITH MESH;  Surgeon: Juancho Hodges MD;  Location[facility] MAIN OR;  Service: General    INGUINAL HERNIA REPAIR Bilateral     IR TEVAR  2018    CT ENDOVASC TAA REINCL SUBCL N/A 2018    Procedure: TEVAR - endovascular thoracic aortic aneurysm repair;  Surgeon: Benton Noguera MD;  Location:  MAIN OR;  Service: Vascular    THORACIC AORTIC ANEURYSM REPAIR  2018    VARICOSE VEIN SURGERY Bilateral     vein stripping     Family History:  Family History   Problem Relation Age of Onset    Tuberculosis Mother     No Known Problems Father     Cancer Sister     Diabetes Family     Hypertension Family      Social History:    Social History     Substance and Sexual Activity   Alcohol Use Never    Frequency: Patient refused     Social History     Substance and Sexual Activity   Drug Use No     Social History     Tobacco Use   Smoking Status Former Smoker    Packs/day: 1 00    Years: 35 00    Pack years: 35 00    Start date:     Last attempt to quit:     Years since quittin 7   Smokeless Tobacco Never Used       Home Medications:   Prior to Admission medications    Medication Sig Start Date End Date Taking?  Authorizing Provider   acetaminophen (TYLENOL) 325 mg tablet Take 2 tablets (650 mg total) by mouth every 6 (six) hours as needed for fever (temperature greater than 101 F) 18  Yes Lucas Munoz PA-C   albuterol (2 5 mg/3 mL) 0 083 % nebulizer solution Take 1 vial (2 5 mg total) by nebulization every 6 (six) hours as needed for wheezing or shortness of breath 18  Yes Dayo Travis MD   albuterol (VENTOLIN HFA) 90 mcg/act inhaler Inhale 2 puffs 17  Yes Historical Provider, MD   aspirin (ECOTRIN LOW STRENGTH) 81 mg EC tablet Take 1 tablet (81 mg total) by mouth daily 19  Yes Lucas Munoz PA-C   atorvastatin (LIPITOR) 10 mg tablet Take 1 tablet (10 mg total) by mouth daily 19  Yes Evelyn Martínez MD budesonide-formoterol (SYMBICORT) 160-4 5 mcg/act inhaler Inhale 2 puffs 2 (two) times a day Rinse mouth after use  Yes Historical Provider, MD   hydrochlorothiazide (HYDRODIURIL) 12 5 mg tablet Take 1 tablet (12 5 mg total) by mouth daily after breakfast 5/15/19  Yes Larry Shields MD   metoprolol tartrate (LOPRESSOR) 50 mg tablet Take 1 tablet (50 mg total) by mouth every 12 (twelve) hours 3/22/19  Yes Reg Mejia MD   pantoprazole (PROTONIX) 40 mg tablet Take 1 tablet (40 mg total) by mouth daily 8/20/18  Yes Nicola Urias MD   amLODIPine (NORVASC) 10 mg tablet Take 10 mg by mouth 1/1/19   Historical Provider, MD   losartan (COZAAR) 100 MG tablet Take 1 tablet (100 mg total) by mouth daily after dinner  Patient not taking: Reported on 9/19/2019 3/22/19   Reg Mejia MD   spironolactone (ALDACTONE) 25 mg tablet Take 1 tablet (25 mg total) by mouth daily after breakfast  Patient not taking: Reported on 9/19/2019 5/15/19   Larry Shields MD   tiotropium [facility]) 18 mcg inhalation capsule Place 18 mcg into inhaler and inhale 9/18/18 9/18/19  Historical Provider, MD       Allergies: Allergies   Allergen Reactions    Penicillins Hives, Itching and Rash    Lisinopril Rash     Side pains and rash        Review of Systems:  Review of Systems   Constitutional: Negative  HENT: Negative  Eyes: Negative  Respiratory: Negative  Cardiovascular: Negative  Gastrointestinal: Negative  Endocrine: Negative  Genitourinary: Negative  Musculoskeletal: Negative  Skin: Negative  Allergic/Immunologic: Negative  Neurological: Negative  Hematological: Negative  Psychiatric/Behavioral: Negative          Vital Signs:     Vitals:    09/19/19 1500 09/19/19 1516   BP: 142/72 142/74   BP Location: Left arm Right arm   Cuff Size: Adult    Pulse: 57    Resp: 14    Temp: 97 9 °F (36 6 °C)    TempSrc: Oral    SpO2: (!) 87%    Weight: 68 kg (150 lb)    Height: 5' 3" (1 6 m)        Physical Exam:  Physical Exam   Constitutional: He is oriented to person, place, and time  He appears well-developed and well-nourished  No distress  HENT:   Head: Normocephalic and atraumatic  Right Ear: External ear normal    Left Ear: External ear normal    Nose: Nose normal    Mouth/Throat: Oropharynx is clear and moist  No oropharyngeal exudate  Eyes: Pupils are equal, round, and reactive to light  Conjunctivae and EOM are normal  Right eye exhibits no discharge  Left eye exhibits no discharge  No scleral icterus  Neck: Normal range of motion  Neck supple  No JVD present  No tracheal deviation present  Cardiovascular: Normal rate, regular rhythm, normal heart sounds and intact distal pulses  Exam reveals no friction rub  No murmur heard  2+ radial pulses bilaterally  1+ DP pulses bilaterally   Pulmonary/Chest: Effort normal and breath sounds normal  No stridor  No respiratory distress  He has no wheezes  He has no rales  He exhibits no tenderness  Abdominal: Soft  Bowel sounds are normal  He exhibits no distension and no mass  There is no tenderness  There is no rebound and no guarding  Musculoskeletal: Normal range of motion  He exhibits no edema, tenderness or deformity  Lymphadenopathy:     He has no cervical adenopathy  Neurological: He is alert and oriented to person, place, and time  He displays normal reflexes  No cranial nerve deficit or sensory deficit  He exhibits normal muscle tone  Coordination normal    Skin: Skin is warm and dry  No rash noted  He is not diaphoretic  No erythema  No pallor  Psychiatric: He has a normal mood and affect  His behavior is normal  Judgment and thought content normal      Imaging Studies:     CT Chest:   FINDINGS:     VASCULAR STRUCTURES:  The ascending aorta is nonaneurysmal measuring a maximal 36 mm in transverse diameter  The proximal aortic arch is mildly ectatic measuring 39 mm    The patient is status post endovascular repair of a fusiform ascending thoracic   aortic aneurysm  The aneurysm sac has decreased in size measuring a maximal 47 mm without an endoleak  The graft is widely patent      The abdominal aorta is diffusely atherosclerotic and tortuous without a significant stenosis  The celiac artery, superior mesenteric artery, and inferior mesenteric artery are patent  2 right and one left renal artery are patent  The right and left   iliofemoral segments are tortuous but patent and the internal iliac arteries are patent      OTHER FINDINGS:      CHEST:      HEART:  Normal cardiac size  No pericardial effusion      LUNGS:  Mild to moderate emphysematous changes throughout the lungs with mild increased volume  Stable lower lobe scarring      PLEURA: No pleural effusion      MEDIASTINUM AND PHYLLIS:  No mass or significant lymphadenopathy      CHEST WALL AND LOWER NECK: Normal      ABDOMEN     LIVER/BILIARY TREE:  Unremarkable      GALLBLADDER:  No calcified gallstones  No pericholecystic inflammatory change      SPLEEN:  Unremarkable  Normal size      PANCREAS:  Unremarkable      ADRENAL GLANDS:  Unremarkable      KIDNEYS/URETERS:  No solid renal mass  No hydronephrosis  Multiple nonobstructing bilateral renal calculi measuring a maximal 5 mm on the left and right      PELVIS     REPRODUCTIVE ORGANS:  Unremarkable for patient's age      URINARY BLADDER:  Unremarkable      ADDITIONAL ABDOMINAL AND PELVIC STRUCTURES:      STOMACH AND BOWEL:  Colonic diverticulosis without diverticulitis      ABDOMINOPELVIC CAVITY:  No pathologically enlarged mesenteric or retroperitoneal lymph nodes    No ascites or free intraperitoneal air      ABDOMINAL WALL/INGUINAL REGIONS:  Unremarkable      OSSEOUS STRUCTURES:  No acute fracture or destructive osseous lesion      IMPRESSION:     Decreasing aneurysm sac status post thoracic aneurysm repair without an endoleak     Ectasia of the proximal aortic arch measuring 39 mm (previously 38)     Emphysema     Bilateral renal calculi     Diverticulosis      above films reviewed in the office with patient, wife and son    Assessment:  Patient Active Problem List    Diagnosis Date Noted    Left leg swelling 03/22/2019    Acquired renal cyst of left kidney 01/21/2019    Preop exam for internal medicine 01/21/2019    Abdominal pain 01/19/2019    Incarcerated right inguinal hernia 01/19/2019    Chronic diastolic CHF (congestive heart failure) ([facility] [address] ) 01/19/2019    Leukocytosis 12/29/2018    Thrombocytopenia ([address] ) 12/29/2018    Hypochloremia 12/29/2018    Acute on chronic respiratory failure with hypoxia and hypercapnia (HCC) 12/28/2018    Aneurysm, aorta, thoracic (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 [facility] ) 02/01/2018    Descending [facility] 02/01/2018    History of DVT (deep vein thrombosis) 02/01/2018    Benign essential hypertension 08/02/2017    Thoracic aortic aneurysm ([facility] ) 08/02/2017     9 month follow up s/p TEVAR procedure     Plan:     CTA chest/abdomen/pelvis performed prior to the visit today was reviewed  No significant findings, stable postoperative changes  1 year follow up with CT chest/abdomen/pelvis in the office  Reviewed importance of normotension with patient, low salt diet and routine taking of anti-hypertensive medications  Aortic Aneurysm Instructions were provided to the patient as follows:    1  No lifting more than 50 pounds  2  Maintain a controlled blood pressure with a goal of 120/80  3  Follow up in [facility] as recommended with radiology follow up as instructed  4   Report to the ER or call 911 immediately with the following signs / symptoms: sudden onset of back pain, chest pain or shortness of breath  5  Continued Tobacco cessation discussed  Patient not interested in GI followup at this time for routine colonscopy screening  Encouraged to address with his PCP       SIGNATURE: Michelle Venegas  DATE: September 19, 2019  TIME: 3:40 PM

## 2019-09-19 NOTE — LETTER
September 19, 2019     Amy Bhardwaj MD  [address] [place] [address]    Patient: Jamarcus Banda   YOB: 1935   Date of Visit: 9/19/2019       Dear Dr Brandan Coy: Thank you for referring Jamarcus Banda to me for evaluation  Below are my notes for this consultation  If you have questions, please do not hesitate to call me  I look forward to following your patient along with you  Sincerely,        Carlos Snell MD        CC: Carles Sandifer, MD Aleen Broccoli, [place]  9/19/2019  3:50 PM  Attested  Consultation - Cardiothoracic Surgery   Jamarcus Banda 82 y o  male MRN: [id number]    Reason for Consult / Principal Problem: f/u TEVAR 12/27/18    History of Present Illness: Jamarcus Banda is a 82y o  year old male who presents today for 9 month follow up from his original TEVAR procedure in 12/27/2018  He states he has been doing well without any complaints  He denies any chest pain, SOB, dizziness, leg weakness, or decreased activity  He tolerates all of his ADLs independently  He is present with his son and wife today on examination  He did have an ER visit this past year for severe HTN  He has been following with outpatient cardiology for management  He is slightly HTN in the office today as well  He tells me he has been taking his medication as prescribed however doesn't consistently take the water pill  He states it "makes him pee too much "  He unfortunately does not follow a low salt diet either  He has lost weight since his procedure and has been more active  He follows with Dr Angie Newman routinely           Past Medical History:  Past Medical History:   Diagnosis Date    Appendicolith     Ascending aortic aneurysm (HCC)     3 7    Asthma     BPH (benign prostatic hyperplasia)     CAD (coronary artery disease)     noted on CT scan    COPD [facility] obstructive [facility] disease) [facility]     Descending thoracic aortic aneurysm ([facility] )  Diverticulosis     Former tobacco use     GERD (gastroesophageal reflux disease)     History of DVT (deep vein thrombosis)     Left leg    History of transfusion     Hypertension     Inguinal hernia     right    Nephrolithiasis     Oxygen dependent     2LNC    Prostate calculus     PVD (peripheral vascular disease) (HCC)     Ulcer     Varicose vein of leg     b/l     Past Surgical History:   Past Surgical History:   Procedure Laterality Date    CARDIAC SURGERY      ESOPHAGOGASTRODUODENOSCOPY      HERNIA REPAIR Right 2019    Procedure: REPAIR HERNIA INGUINAL WITH MESH;  Surgeon: Yann Alarcon MD;  Location: [facility] MAIN OR;  Service: General    INGUINAL HERNIA REPAIR Bilateral     IR TEVAR  2018    KS ENDOVASC TAA REINCL SUBCL N/A 2018    Procedure: TEVAR - endovascular thoracic aortic aneurysm repair;  Surgeon: Ade Ortega MD;  Location:  MAIN OR;  Service: Vascular    THORACIC AORTIC ANEURYSM REPAIR  2018    VARICOSE VEIN SURGERY Bilateral     vein stripping     Family History:  Family History   Problem Relation Age of Onset    Tuberculosis Mother     No Known Problems Father     Cancer Sister     Diabetes Family     Hypertension Family      Social History:    Social History     Substance and Sexual Activity   Alcohol Use Never    Frequency: Patient refused     Social History     Substance and Sexual Activity   Drug Use No     Social History     Tobacco Use   Smoking Status Former Smoker    Packs/day: 1 00    Years: 35 00    Pack years: 35 00    Start date:     Last attempt to quit:     Years since quittin 7   Smokeless Tobacco Never Used       Home Medications:   Prior to Admission medications    Medication Sig Start Date End Date Taking?  Authorizing Provider   acetaminophen (TYLENOL) 325 mg tablet Take 2 tablets (650 mg total) by mouth every 6 (six) hours as needed for fever (temperature greater than 101 F) 18  Yes Freddy Ca JEREMIE   albuterol (2 5 mg/3 mL) 0 083 % nebulizer solution Take 1 vial (2 5 mg total) by nebulization every 6 (six) hours as needed for wheezing or shortness of breath 6/26/18  Yes Cornelius Gillette MD   albuterol (VENTOLIN HFA) 90 mcg/act inhaler Inhale 2 puffs 8/8/17  Yes Historical Provider, MD   aspirin (ECOTRIN LOW STRENGTH) 81 mg EC tablet Take 1 tablet (81 mg total) by mouth daily 1/1/19  Yes Lucas Munoz PA-C   atorvastatin (LIPITOR) 10 mg tablet Take 1 tablet (10 mg total) by mouth daily 7/18/19  Yes Francie Craven MD   budesonide-formoterol [facility]) 160-4 5 mcg/act inhaler Inhale 2 puffs 2 (two) times a day Rinse mouth after use  Yes Historical Provider, MD   hydrochlorothiazide (HYDRODIURIL) 12 5 mg tablet Take 1 tablet (12 5 mg total) by mouth daily after breakfast 5/15/19  Yes Francie Craven MD   metoprolol tartrate (LOPRESSOR) 50 mg tablet Take 1 tablet (50 mg total) by mouth every 12 (twelve) hours 3/22/19  Yes John Dinh MD   pantoprazole (PROTONIX) 40 mg tablet Take 1 tablet (40 mg total) by mouth daily 8/20/18  Yes Cornelius Gillette MD   amLODIPine (NORVASC) 10 mg tablet Take 10 mg by mouth 1/1/19   Historical Provider, MD   losartan (COZAAR) 100 MG tablet Take 1 tablet (100 mg total) by mouth daily after dinner  Patient not taking: Reported on 9/19/2019 3/22/19   John Dinh MD   spironolactone (ALDACTONE) 25 mg tablet Take 1 tablet (25 mg total) by mouth daily after breakfast  Patient not taking: Reported on 9/19/2019 5/15/19   Francie Craven MD   tiotropium [facility]) 18 mcg inhalation capsule Place 18 mcg into inhaler and inhale 9/18/18 9/18/19  Historical Provider, MD       Allergies: Allergies   Allergen Reactions    Penicillins Hives, Itching and Rash    Lisinopril Rash     Side pains and rash        Review of Systems:  Review of Systems   Constitutional: Negative  HENT: Negative  Eyes: Negative  Respiratory: Negative  Cardiovascular: Negative      Gastrointestinal: Negative  Endocrine: Negative  Genitourinary: Negative  Musculoskeletal: Negative  Skin: Negative  Allergic/Immunologic: Negative  Neurological: Negative  Hematological: Negative  Psychiatric/Behavioral: Negative  Vital Signs:     Vitals:    09/19/19 1500 09/19/19 1516   BP: 142/72 142/74   BP Location: Left arm Right arm   Cuff Size: Adult    Pulse: 57    Resp: 14    Temp: 97 9 °F (36 6 °C)    TempSrc: Oral    SpO2: (!) 87%    Weight: 68 kg (150 lb)    Height: 5' 3" (1 6 m)        Physical Exam:  Physical Exam   Constitutional: He is oriented to person, place, and time  He appears well-developed and well-nourished  No distress  HENT:   Head: Normocephalic and atraumatic  Right Ear: External ear normal    Left Ear: External ear normal    Nose: Nose normal    Mouth/Throat: Oropharynx is clear and moist  No oropharyngeal exudate  Eyes: Pupils are equal, round, and reactive to light  Conjunctivae and EOM are normal  Right eye exhibits no discharge  Left eye exhibits no discharge  No scleral icterus  Neck: Normal range of motion  Neck supple  No JVD present  No tracheal deviation present  Cardiovascular: Normal rate, regular rhythm, normal heart sounds and intact distal pulses  Exam reveals no friction rub  No murmur heard  2+ radial pulses bilaterally  1+ DP pulses bilaterally   Pulmonary/Chest: Effort normal and breath sounds normal  No stridor  No respiratory distress  He has no wheezes  He has no rales  He exhibits no tenderness  Abdominal: Soft  Bowel sounds are normal  He exhibits no distension and no mass  There is no tenderness  There is no rebound and no guarding  Musculoskeletal: Normal range of motion  He exhibits no edema, tenderness or deformity  Lymphadenopathy:     He has no cervical adenopathy  Neurological: He is alert and oriented to person, place, and time  He displays normal reflexes  No cranial nerve deficit or sensory deficit   He exhibits normal muscle tone  Coordination normal    Skin: Skin is warm and dry  No rash noted  He is not diaphoretic  No erythema  No pallor  Psychiatric: He has a normal mood and affect  His behavior is normal  Judgment and thought content normal      Imaging Studies:     CT Chest:   FINDINGS:     VASCULAR STRUCTURES:  The ascending aorta is nonaneurysmal measuring a maximal 36 mm in transverse diameter  The proximal aortic arch is mildly ectatic measuring 39 mm  The patient is status post endovascular repair of a fusiform ascending thoracic   aortic aneurysm  The aneurysm sac has decreased in size measuring a maximal 47 mm without an endoleak  The graft is widely patent      The abdominal aorta is diffusely atherosclerotic and tortuous without a significant stenosis  The celiac artery, superior mesenteric artery, and inferior mesenteric artery are patent  2 right and one left renal artery are patent  The right and left   iliofemoral segments are tortuous but patent and the internal iliac arteries are patent      OTHER FINDINGS:      CHEST:      HEART:  Normal cardiac size  No pericardial effusion      LUNGS:  Mild to moderate emphysematous changes throughout the lungs with mild increased volume  Stable lower lobe scarring      PLEURA: No pleural effusion      MEDIASTINUM AND PHYLLIS:  No mass or significant lymphadenopathy      CHEST WALL AND LOWER NECK: Normal      ABDOMEN     LIVER/BILIARY TREE:  Unremarkable      GALLBLADDER:  No calcified gallstones  No pericholecystic inflammatory change      SPLEEN:  Unremarkable  Normal size      PANCREAS:  Unremarkable      ADRENAL GLANDS:  Unremarkable      KIDNEYS/URETERS:  No solid renal mass  No hydronephrosis    Multiple nonobstructing bilateral renal calculi measuring a maximal 5 mm on the left and right      PELVIS     REPRODUCTIVE ORGANS:  Unremarkable for patient's age      URINARY BLADDER:  Unremarkable      ADDITIONAL ABDOMINAL AND PELVIC STRUCTURES:      STOMACH AND BOWEL:  Colonic diverticulosis without diverticulitis      ABDOMINOPELVIC CAVITY:  No pathologically enlarged mesenteric or retroperitoneal lymph nodes  No ascites or free intraperitoneal air      ABDOMINAL WALL/INGUINAL REGIONS:  Unremarkable      OSSEOUS STRUCTURES:  No acute fracture or destructive osseous lesion      IMPRESSION:     Decreasing aneurysm sac status post thoracic aneurysm repair without an endoleak     Ectasia of the proximal aortic arch measuring 39 mm (previously 38)     Emphysema     Bilateral renal calculi     Diverticulosis      above films reviewed in the office with patient, wife and son    Assessment:  Patient Active Problem List    Diagnosis Date Noted    Left leg swelling 03/22/2019    Acquired renal cyst of left kidney 01/21/2019    Preop exam for internal medicine 01/21/2019    Abdominal pain 01/19/2019    Incarcerated right inguinal hernia 01/19/2019    Chronic diastolic CHF (congestive heart failure) ([address] ) 01/19/2019    Leukocytosis 12/29/2018    Thrombocytopenia ([address] ) 12/29/2018    Hypochloremia 12/29/2018    Acute on chronic respiratory failure with hypoxia and hypercapnia (HCC) 12/28/2018    Aneurysm, aorta, thoracic ([facility] )     Thoracic aortic aneurysm without rupture ([facility] ) 11/19/2018    Edema of both legs 08/02/2018    Snoring 08/02/2018    Varicose veins of both lower extremities with pain 05/23/2018    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[address] ) 05/23/2018    Chronic respiratory failure with hypoxia (HCC) 04/02/2018    Accelerated essential hypertension 02/01/2018    COPD (chronic obstructive pulmonary disease) ([address] ) 02/01/2018    Descending aortic aneurysm ([facility] ) 02/01/2018    History of DVT (deep vein thrombosis) 02/01/2018    Benign essential hypertension 08/02/2017    Thoracic aortic aneurysm ([address] ) 08/02/2017     9 month follow up s/p TEVAR procedure     Plan:     CTA chest/abdomen/pelvis performed prior to the visit today was reviewed    No significant findings, stable postoperative changes  1 year follow up with CT chest/abdomen/pelvis in the office  Reviewed importance of normotension with patient, low salt diet and routine taking of anti-hypertensive medications  Aortic Aneurysm Instructions were provided to the patient as follows:    1  No lifting more than 50 pounds  2  Maintain a controlled blood pressure with a goal of 120/80  3  Follow up in [facility] as recommended with radiology follow up as instructed  4  Report to the ER or call 911 immediately with the following signs / symptoms: sudden onset of back pain, chest pain or shortness of breath  5  Continued Tobacco cessation discussed  Patient not interested in GI followup at this time for routine colonscopy screening  Encouraged to address with his PCP  SIGNATURE: Kyle Donald  DATE: September 19, 2019  TIME: 3:40 PM  Attestation signed by Dulce Reese MD at 9/19/2019  4:36 PM:  Patient seen and evaluated with RAQUEL / JEREMIE  I agree with the above assessment and plan with the following additions  Asymptomatic  Poor BP control, he has medication compliance issues    CTA shows normal postop changes, decrease in aneurysm sac size    Plan:  Follow up with PCP and cardiology, strict BP control  Follow up un 1 yr with CT C/A/P

## 2019-09-30 ENCOUNTER — HOSPITAL ENCOUNTER (EMERGENCY)
Facility: HOSPITAL | Age: 84
Discharge: HOME | End: 2019-09-30
Attending: EMERGENCY MEDICINE | Admitting: EMERGENCY MEDICINE
Payer: COMMERCIAL

## 2019-09-30 ENCOUNTER — APPOINTMENT (EMERGENCY)
Dept: RADIOLOGY | Facility: HOSPITAL | Age: 84
End: 2019-09-30
Payer: COMMERCIAL

## 2019-09-30 VITALS
WEIGHT: 154.76 LBS | HEART RATE: 57 BPM | BODY MASS INDEX: 27.42 KG/M2 | OXYGEN SATURATION: 94 % | DIASTOLIC BLOOD PRESSURE: 60 MMHG | RESPIRATION RATE: 20 BRPM | TEMPERATURE: 96.5 F | SYSTOLIC BLOOD PRESSURE: 166 MMHG

## 2019-09-30 DIAGNOSIS — M79.602 LEFT ARM PAIN: Primary | ICD-10-CM

## 2019-09-30 LAB
ANION GAP SERPL CALCULATED.3IONS-SCNC: 2 MMOL/L (ref 5–14)
BASOPHILS # BLD AUTO: 0.08 THOUSAND/UL (ref 0–0.1)
BASOPHILS NFR MAR MANUAL: 1 % (ref 0–1)
BUN SERPL-MCNC: 21 MG/DL (ref 5–25)
CALCIUM SERPL-MCNC: 9.4 MG/DL (ref 8.4–10.2)
CHLORIDE SERPL-SCNC: 97 MMOL/L (ref 97–108)
CO2 SERPL-SCNC: 43 MMOL/L (ref 22–30)
CREAT SERPL-MCNC: 0.89 MG/DL (ref 0.7–1.5)
EOSINOPHIL # BLD AUTO: 1.14 THOUSAND/UL (ref 0–0.4)
EOSINOPHIL NFR BLD MANUAL: 15 % (ref 0–6)
ERYTHROCYTE [DISTWIDTH] IN BLOOD BY AUTOMATED COUNT: 14.2 %
GFR SERPL CREATININE-BSD FRML MDRD: 80 ML/MIN/1.73SQ M
GLUCOSE SERPL-MCNC: 116 MG/DL (ref 70–99)
HCT VFR BLD AUTO: 46.8 % (ref 41–53)
HGB BLD-MCNC: 15.1 G/DL (ref 13.5–17.5)
LYMPHOCYTES # BLD AUTO: 1.98 THOUSAND/UL (ref 0.5–4)
LYMPHOCYTES # BLD AUTO: 26 % (ref 25–45)
MCH RBC QN AUTO: 31.6 PG (ref 26–34)
MCHC RBC AUTO-ENTMCNC: 32.2 G/DL (ref 31–36)
MCV RBC AUTO: 98 FL (ref 80–100)
MONOCYTES # BLD AUTO: 0.53 THOUSAND/UL (ref 0.2–0.9)
MONOCYTES NFR BLD AUTO: 7 % (ref 1–10)
NEUTS BAND NFR BLD MANUAL: 5 % (ref 0–8)
NEUTS SEG # BLD: 3.72 THOUSAND/UL (ref 1.8–7.8)
NEUTS SEG NFR BLD AUTO: 44 %
NT-PROBNP SERPL-MCNC: 196 PG/ML (ref 0–299)
PLATELET # BLD AUTO: 193 THOUSANDS/UL (ref 150–450)
PLATELET BLD QL SMEAR: ADEQUATE
PMV BLD AUTO: 7 FL (ref 8.9–12.7)
POTASSIUM SERPL-SCNC: 4.5 MMOL/L (ref 3.6–5)
RBC # BLD AUTO: 4.78 MILLION/UL (ref 4.5–5.9)
RBC MORPH BLD: NORMAL
SODIUM SERPL-SCNC: 142 MMOL/L (ref 137–147)
TOTAL CELLS COUNTED SPEC: 100
TROPONIN I SERPL-MCNC: <0.01 NG/ML (ref 0–0.03)
VARIANT LYMPHS # BLD AUTO: 2 % (ref 0–0)
WBC # BLD AUTO: 7.6 THOUSAND/UL (ref 4.5–11)

## 2019-09-30 PROCEDURE — 99285 EMERGENCY DEPT VISIT HI MDM: CPT | Performed by: EMERGENCY MEDICINE

## 2019-09-30 PROCEDURE — 85027 COMPLETE CBC AUTOMATED: CPT | Performed by: EMERGENCY MEDICINE

## 2019-09-30 PROCEDURE — 84484 ASSAY OF TROPONIN QUANT: CPT | Performed by: EMERGENCY MEDICINE

## 2019-09-30 PROCEDURE — 36415 COLL VENOUS BLD VENIPUNCTURE: CPT | Performed by: EMERGENCY MEDICINE

## 2019-09-30 PROCEDURE — 85007 BL SMEAR W/DIFF WBC COUNT: CPT | Performed by: EMERGENCY MEDICINE

## 2019-09-30 PROCEDURE — 99283 EMERGENCY DEPT VISIT LOW MDM: CPT

## 2019-09-30 PROCEDURE — 80048 BASIC METABOLIC PNL TOTAL CA: CPT | Performed by: EMERGENCY MEDICINE

## 2019-09-30 PROCEDURE — 71045 X-RAY EXAM CHEST 1 VIEW: CPT

## 2019-09-30 PROCEDURE — 83880 ASSAY OF NATRIURETIC PEPTIDE: CPT | Performed by: EMERGENCY MEDICINE

## 2019-09-30 PROCEDURE — 93005 ELECTROCARDIOGRAM TRACING: CPT

## 2019-09-30 NOTE — ED PROVIDER NOTES
History  Chief Complaint   Patient presents with    Arm Pain     Left arm pain x5 days, patient denies any chest or radiating pain     Patient is a 53-year-old male with a history of COPD and descending aortic aneurysm prior presents with a 5 day history of left arm pain  Patient eyes any chest pain shortness of breath  Concert pain nothing makes it better or worse  Patient with found be hypoxic here in triage  Patient states that he will occasionally wear his oxygen and for after break with a  Review of epic shows that patient is to be chronically on 2 L nasal cannula  Patient states he is compliant with medication although last cardiology note shows that patient has been noncompliant  Patient cannot describe the pain  Does not radiate  Prior to Admission Medications   Prescriptions Last Dose Informant Patient Reported?  Taking?   acetaminophen (TYLENOL) 325 mg tablet  Self No No   Sig: Take 2 tablets (650 mg total) by mouth every 6 (six) hours as needed for fever (temperature greater than 101 F)   albuterol (2 5 mg/3 mL) 0 083 % nebulizer solution  Self No No   Sig: Take 1 vial (2 5 mg total) by nebulization every 6 (six) hours as needed for wheezing or shortness of breath   albuterol (VENTOLIN HFA) 90 mcg/act inhaler  Self Yes No   Sig: Inhale 2 puffs   amLODIPine (NORVASC) 10 mg tablet  Self Yes No   Sig: Take 10 mg by mouth   aspirin (ECOTRIN LOW STRENGTH) 81 mg EC tablet  Self No No   Sig: Take 1 tablet (81 mg total) by mouth daily   atorvastatin (LIPITOR) 10 mg tablet  Self No No   Sig: Take 1 tablet (10 mg total) by mouth daily   budesonide-formoterol (SYMBICORT) 160-4 5 mcg/act inhaler  Self Yes No   Sig: Inhale 2 puffs 2 (two) times a day Rinse mouth after use    hydrochlorothiazide (HYDRODIURIL) 12 5 mg tablet  Self No No   Sig: Take 1 tablet (12 5 mg total) by mouth daily after breakfast   losartan (COZAAR) 100 MG tablet  Self No No   Sig: Take 1 tablet (100 mg total) by mouth daily after dinner   Patient not taking: Reported on 9/19/2019   metoprolol tartrate (LOPRESSOR) 50 mg tablet  Self No No   Sig: Take 1 tablet (50 mg total) by mouth every 12 (twelve) hours   pantoprazole (PROTONIX) 40 mg tablet  Self No No   Sig: Take 1 tablet (40 mg total) by mouth daily   spironolactone (ALDACTONE) 25 mg tablet  Self No No   Sig: Take 1 tablet (25 mg total) by mouth daily after breakfast   Patient not taking: Reported on 9/19/2019   tiotropium (SPIRIVA) 18 mcg inhalation capsule  Spouse/Significant Other Yes No   Sig: Place 18 mcg into inhaler and inhale      Facility-Administered Medications: None       Past Medical History:   Diagnosis Date    Appendicolith     Ascending aortic aneurysm (HCC)     3 7    Asthma     BPH (benign prostatic hyperplasia)     CAD (coronary artery disease)     noted on CT scan    COPD (chronic obstructive pulmonary disease) (HCC)     Descending thoracic aortic aneurysm (HCC)     Diverticulosis     Former tobacco use     GERD (gastroesophageal reflux disease)     History of DVT (deep vein thrombosis)     Left leg    History of transfusion     Hypertension     Inguinal hernia     right    Nephrolithiasis     Oxygen dependent     2LNC    Prostate calculus     PVD (peripheral vascular disease) (HCC)     Ulcer     Varicose vein of leg     b/l       Past Surgical History:   Procedure Laterality Date    CARDIAC SURGERY      ESOPHAGOGASTRODUODENOSCOPY      HERNIA REPAIR Right 1/21/2019    Procedure: REPAIR HERNIA INGUINAL WITH MESH;  Surgeon: Mitzy Farooq MD;  Location: [address] [place] [address] MAIN OR;  Service: General    INGUINAL HERNIA REPAIR Bilateral     IR TEVAR  12/27/2018    [place] ENDOVASC TAA REINCL SUBCL N/A 12/27/2018    Procedure: TEVAR - endovascular thoracic aortic aneurysm repair;  Surgeon: Lolita Ortega MD;  Location:  MAIN OR;  Service: Vascular    THORACIC AORTIC ANEURYSM REPAIR  12/27/2018    VARICOSE VEIN SURGERY Bilateral     vein stripping       Family History Problem Relation Age of Onset    Tuberculosis Mother     No Known Problems Father     Cancer Sister     Diabetes Family     Hypertension Family      I have reviewed and agree with the history as documented  Social History     Tobacco Use    Smoking status: Former Smoker     Packs/day: 1 00     Years: 35 00     Pack years: 35 00     Start date:      Last attempt to quit:      Years since quittin 7    Smokeless tobacco: Never Used   Substance Use Topics    Alcohol use: Never     Frequency: Patient refused    Drug use: No        Review of Systems   Constitutional: Negative  Negative for activity change and appetite change  HENT: Negative  Negative for congestion  Eyes: Negative  Respiratory: Negative  Negative for shortness of breath  Cardiovascular: Negative  Negative for chest pain  Gastrointestinal: Negative  Negative for abdominal pain, diarrhea, nausea and vomiting  Endocrine: Negative  Genitourinary: Negative  Musculoskeletal: Positive for myalgias  Skin: Negative  Allergic/Immunologic: Negative  Neurological: Negative  Negative for weakness, numbness and headaches  Hematological: Negative  Psychiatric/Behavioral: Negative  All other systems reviewed and are negative  Physical Exam  Physical Exam   Constitutional: He is oriented to person, place, and time  He appears well-developed and well-nourished  HENT:   Head: Normocephalic  Right Ear: External ear normal    Left Ear: External ear normal    Nose: Nose normal    Eyes: Pupils are equal, round, and reactive to light  Conjunctivae are normal    Neck: Normal range of motion  Neck supple  Cardiovascular: Normal rate, regular rhythm, normal heart sounds and intact distal pulses  Pulmonary/Chest: Effort normal and breath sounds normal  No stridor  No respiratory distress  He has no wheezes  Abdominal: Soft  Bowel sounds are normal  He exhibits no distension  There is no tenderness  Musculoskeletal: Normal range of motion  He exhibits no edema, tenderness or deformity  Neurological: He is alert and oriented to person, place, and time  Skin: Skin is warm and dry  Capillary refill takes less than 2 seconds  Psychiatric: He has a normal mood and affect  His behavior is normal    Nursing note and vitals reviewed        Vital Signs  ED Triage Vitals   Temperature Pulse Respirations Blood Pressure SpO2   09/30/19 1210 09/30/19 1210 09/30/19 1210 09/30/19 1210 09/30/19 1210   (!) 96 5 °F (35 8 °C) 57 18 (!) 180/102 (!) 84 %      Temp Source Heart Rate Source Patient Position - Orthostatic VS BP Location FiO2 (%)   09/30/19 1210 09/30/19 1210 09/30/19 1259 09/30/19 1210 --   Tympanic Monitor Lying Right arm       Pain Score       09/30/19 1223       5           Vitals:    09/30/19 1210 09/30/19 1259 09/30/19 1338   BP: (!) 180/102 (!) 176/93 166/60   Pulse: 57 (!) 53 57   Patient Position - Orthostatic VS:  Lying Lying         Visual Acuity  Visual Acuity      Most Recent Value   L Pupil Size (mm)  3   R Pupil Size (mm)  3          ED Medications  Medications - No data to display    Diagnostic Studies  Results Reviewed     Procedure Component Value Units Date/Time    Troponin I [[id number]]  (Normal) Collected:  09/30/19 1232    Lab Status:  Final result Specimen:  Blood from Arm, Right Updated:  09/30/19 1303     Troponin I <0 01 ng/mL     Narrative:       Hemolysis    NT-BNP PRO [[id number]]  (Normal) Collected:  09/30/19 1232    Lab Status:  Final result Specimen:  Blood from Arm, Right Updated:  09/30/19 1300     NT-proBNP 196 pg/mL     Basic metabolic panel [[id number]]  (Abnormal) Collected:  09/30/19 1232    Lab Status:  Final result Specimen:  Blood from Arm, Right Updated:  09/30/19 1259     Sodium 142 mmol/L      Potassium 4 5 mmol/L      Chloride 97 mmol/L      CO2 43 mmol/L      ANION GAP 2 mmol/L      BUN 21 mg/dL      Creatinine 0 89 mg/dL      Glucose 116 mg/dL      Calcium 9 4 mg/dL eGFR 80 ml/min/1 73sq m     Narrative:       Darryl guidelines for Chronic Kidney Disease (CKD):     Stage 1 with normal or high GFR (GFR > 90 mL/min/1 73 square meters)    Stage 2 Mild CKD (GFR = 60-89 mL/min/1 73 square meters)    Stage 3A Moderate CKD (GFR = 45-59 mL/min/1 73 square meters)    Stage 3B Moderate CKD (GFR = 30-44 mL/min/1 73 square meters)    Stage 4 Severe CKD (GFR = 15-29 mL/min/1 73 square meters)    Stage 5 End Stage CKD (GFR <15 mL/min/1 73 square meters)  Note: GFR calculation is accurate only with a steady state creatinine    CBC and differential [[id number]]  (Abnormal) Collected:  09/30/19 1232    Lab Status:  Final result Specimen:  Blood from Arm, Right Updated:  09/30/19 1244     WBC 7 60 Thousand/uL      RBC 4 78 Million/uL      Hemoglobin 15 1 g/dL      Hematocrit 46 8 %      MCV 98 fL      MCH 31 6 pg      MCHC 32 2 g/dL      RDW 14 2 %      MPV 7 0 fL      Platelets 119 Thousands/uL                  XR chest portable   ED Interpretation by Wei Chacon MD (09/30 6040)   NAD  Procedures  ECG 12 Lead Documentation Only  Date/Time: 9/30/2019 12:20 PM  Performed by: Wei Chacon MD  Authorized by: Wei Chacon MD     Indications / Diagnosis:  Left arm pain  ECG reviewed by me, the ED Provider: yes    Patient location:  ED  Previous ECG:     Comparison to cardiac monitor: Yes    Interpretation:     Interpretation: abnormal    Rate:     ECG rate:  54    ECG rate assessment: bradycardic    Rhythm:     Rhythm: sinus bradycardia    Ectopy:     Ectopy: none    QRS:     QRS axis:  Normal    QRS intervals:  Normal  Conduction:     Conduction: normal    ST segments:     ST segments:  Normal  T waves:     T waves: normal             ED Course  ED Course as of Sep 30 1341   Mon Sep 30, 2019   1338 Went over results with patient  Feeling improved this time    Will discharge at this point follow up with PCP return the ER for any concerns  MDM  Number of Diagnoses or Management Options  Left arm pain:      Amount and/or Complexity of Data Reviewed  Clinical lab tests: ordered and reviewed  Tests in the radiology section of CPT®: ordered and reviewed  Tests in the medicine section of CPT®: ordered and reviewed  Obtain history from someone other than the patient: yes  Review and summarize past medical records: yes  Independent visualization of images, tracings, or specimens: yes        Disposition  Final diagnoses:   Left arm pain     Time reflects when diagnosis was documented in both MDM as applicable and the Disposition within this note     Time User Action Codes Description Comment    9/30/2019  1:40 PM Jamshid Maciel Add [Q42 307] Left arm pain       ED Disposition     ED Disposition Condition Date/Time Comment    Discharge Stable Mon Sep 30, 2019  3:06 PM [address] discharge to home/self care  Follow-up Information     Follow up With Specialties Details Why Contact Info    Iman Ortiz MD Family Medicine   [facility]  [phone]            Patient's Medications   Discharge Prescriptions    No medications on file     No discharge procedures on file      ED Provider  Electronically Signed by           Philippe Rahman MD  09/30/19 [address] Aydin MD  09/30/19 9308

## 2019-10-01 LAB
ATRIAL RATE: 54 BPM
P AXIS: 57 DEGREES
PR INTERVAL: 170 MS
QRS AXIS: 70 DEGREES
QRSD INTERVAL: 80 MS
QT INTERVAL: 432 MS
QTC INTERVAL: 409 MS
T WAVE AXIS: 46 DEGREES
VENTRICULAR RATE: 54 BPM

## 2019-10-01 PROCEDURE — 93010 ELECTROCARDIOGRAM REPORT: CPT | Performed by: INTERNAL MEDICINE

## 2019-11-17 ENCOUNTER — HOSPITAL ENCOUNTER (EMERGENCY)
Facility: HOSPITAL | Age: 84
Discharge: HOME | End: 2019-11-17
Attending: EMERGENCY MEDICINE | Admitting: EMERGENCY MEDICINE
Payer: COMMERCIAL

## 2019-11-17 VITALS
TEMPERATURE: 97.7 F | BODY MASS INDEX: 29.41 KG/M2 | HEART RATE: 58 BPM | OXYGEN SATURATION: 96 % | DIASTOLIC BLOOD PRESSURE: 95 MMHG | WEIGHT: 166.01 LBS | SYSTOLIC BLOOD PRESSURE: 179 MMHG | RESPIRATION RATE: 22 BRPM

## 2019-11-17 DIAGNOSIS — I10 HYPERTENSION: Primary | ICD-10-CM

## 2019-11-17 LAB
ALBUMIN SERPL BCP-MCNC: 4.4 G/DL (ref 3–5.2)
ALP SERPL-CCNC: 60 U/L (ref 43–122)
ALT SERPL W P-5'-P-CCNC: 15 U/L (ref 9–52)
ANION GAP SERPL CALCULATED.3IONS-SCNC: 5 MMOL/L (ref 5–14)
AST SERPL W P-5'-P-CCNC: 45 U/L (ref 17–59)
BASOPHILS # BLD AUTO: 0.07 THOUSAND/UL (ref 0–0.1)
BASOPHILS NFR MAR MANUAL: 1 % (ref 0–1)
BILIRUB SERPL-MCNC: 1.3 MG/DL
BUN SERPL-MCNC: 19 MG/DL (ref 5–25)
CALCIUM SERPL-MCNC: 9.1 MG/DL (ref 8.4–10.2)
CHLORIDE SERPL-SCNC: 95 MMOL/L (ref 97–108)
CO2 SERPL-SCNC: 42 MMOL/L (ref 22–30)
CREAT SERPL-MCNC: 0.92 MG/DL (ref 0.7–1.5)
EOSINOPHIL # BLD AUTO: 1.24 THOUSAND/UL (ref 0–0.4)
EOSINOPHIL NFR BLD MANUAL: 17 % (ref 0–6)
ERYTHROCYTE [DISTWIDTH] IN BLOOD BY AUTOMATED COUNT: 14.7 %
GFR SERPL CREATININE-BSD FRML MDRD: 77 ML/MIN/1.73SQ M
GLUCOSE SERPL-MCNC: 134 MG/DL (ref 70–99)
HCT VFR BLD AUTO: 50.6 % (ref 41–53)
HGB BLD-MCNC: 16.3 G/DL (ref 13.5–17.5)
LYMPHOCYTES # BLD AUTO: 2.7 THOUSAND/UL (ref 0.5–4)
LYMPHOCYTES # BLD AUTO: 37 % (ref 25–45)
MCH RBC QN AUTO: 31.6 PG (ref 26–34)
MCHC RBC AUTO-ENTMCNC: 32.2 G/DL (ref 31–36)
MCV RBC AUTO: 98 FL (ref 80–100)
MONOCYTES # BLD AUTO: 0.22 THOUSAND/UL (ref 0.2–0.9)
MONOCYTES NFR BLD AUTO: 3 % (ref 1–10)
NEUTS BAND NFR BLD MANUAL: 2 % (ref 0–8)
NEUTS SEG # BLD: 2.92 THOUSAND/UL (ref 1.8–7.8)
NEUTS SEG NFR BLD AUTO: 38 %
PLATELET # BLD AUTO: 141 THOUSANDS/UL (ref 150–450)
PLATELET BLD QL SMEAR: ABNORMAL
PMV BLD AUTO: 7.7 FL (ref 8.9–12.7)
POTASSIUM SERPL-SCNC: 5 MMOL/L (ref 3.6–5)
PROT SERPL-MCNC: 8.5 G/DL (ref 5.9–8.4)
RBC # BLD AUTO: 5.15 MILLION/UL (ref 4.5–5.9)
RBC MORPH BLD: NORMAL
SODIUM SERPL-SCNC: 142 MMOL/L (ref 137–147)
TOTAL CELLS COUNTED SPEC: 100
TROPONIN I SERPL-MCNC: <0.01 NG/ML (ref 0–0.03)
VARIANT LYMPHS # BLD AUTO: 2 % (ref 0–0)
WBC # BLD AUTO: 7.3 THOUSAND/UL (ref 4.5–11)

## 2019-11-17 PROCEDURE — 36415 COLL VENOUS BLD VENIPUNCTURE: CPT | Performed by: EMERGENCY MEDICINE

## 2019-11-17 PROCEDURE — 99284 EMERGENCY DEPT VISIT MOD MDM: CPT | Performed by: EMERGENCY MEDICINE

## 2019-11-17 PROCEDURE — 84484 ASSAY OF TROPONIN QUANT: CPT | Performed by: EMERGENCY MEDICINE

## 2019-11-17 PROCEDURE — 99284 EMERGENCY DEPT VISIT MOD MDM: CPT

## 2019-11-17 PROCEDURE — 85007 BL SMEAR W/DIFF WBC COUNT: CPT | Performed by: EMERGENCY MEDICINE

## 2019-11-17 PROCEDURE — 85027 COMPLETE CBC AUTOMATED: CPT | Performed by: EMERGENCY MEDICINE

## 2019-11-17 PROCEDURE — 80053 COMPREHEN METABOLIC PANEL: CPT | Performed by: EMERGENCY MEDICINE

## 2019-11-17 PROCEDURE — 93005 ELECTROCARDIOGRAM TRACING: CPT

## 2019-11-18 LAB
ATRIAL RATE: 59 BPM
P AXIS: 61 DEGREES
PR INTERVAL: 164 MS
QRS AXIS: 68 DEGREES
QRSD INTERVAL: 84 MS
QT INTERVAL: 444 MS
QTC INTERVAL: 439 MS
T WAVE AXIS: 52 DEGREES
VENTRICULAR RATE: 59 BPM

## 2019-11-18 PROCEDURE — 93010 ELECTROCARDIOGRAM REPORT: CPT | Performed by: INTERNAL MEDICINE

## 2019-11-18 NOTE — ED PROVIDER NOTES
History  Chief Complaint   Patient presents with    Hypertension     wife states that for 2 days pt BP has been high  wife states that pt has been taking BP meds  pt denies chest pain, HA or SOB  81 yo male with a complicated past medical history including COPD, HTN, CAD, asthma, and PVD brought in by EMS for evaluation of asymptomatic hypertension  The patient's wife says she has been taking his blood pressure at home "all day" and noticed some elevated readings so she called 9-1-1  He has absolutely no complaints  No chest pain or shortness of breath  He denies any visual disturbance  No dizziness or lightheadedness  No N/V  Wife says he has been compliant with his medications  No headache  Prior to Admission Medications   Prescriptions Last Dose Informant Patient Reported?  Taking?   acetaminophen (TYLENOL) 325 mg tablet  Self No No   Sig: Take 2 tablets (650 mg total) by mouth every 6 (six) hours as needed for fever (temperature greater than 101 F)   albuterol (2 5 mg/3 mL) 0 083 % nebulizer solution  Self No No   Sig: Take 1 vial (2 5 mg total) by nebulization every 6 (six) hours as needed for wheezing or shortness of breath   albuterol (VENTOLIN HFA) 90 mcg/act inhaler  Self Yes No   Sig: Inhale 2 puffs   amLODIPine (NORVASC) 10 mg tablet  Self Yes No   Sig: Take 10 mg by mouth   aspirin (ECOTRIN LOW STRENGTH) 81 mg EC tablet  Self No No   Sig: Take 1 tablet (81 mg total) by mouth daily   atorvastatin (LIPITOR) 10 mg tablet  Self No No   Sig: Take 1 tablet (10 mg total) by mouth daily   budesonide-formoterol (SYMBICORT) 160-4 5 mcg/act inhaler  Self Yes No   Sig: Inhale 2 puffs 2 (two) times a day Rinse mouth after use    hydrochlorothiazide (HYDRODIURIL) 12 5 mg tablet  Self No No   Sig: Take 1 tablet (12 5 mg total) by mouth daily after breakfast   losartan (COZAAR) 100 MG tablet  Self No No   Sig: Take 1 tablet (100 mg total) by mouth daily after dinner   Patient not taking: Reported on 9/19/2019   metoprolol tartrate (LOPRESSOR) 50 mg tablet  Self No No   Sig: Take 1 tablet (50 mg total) by mouth every 12 (twelve) hours   pantoprazole (PROTONIX) 40 mg tablet  Self No No   Sig: Take 1 tablet (40 mg total) by mouth daily   spironolactone (ALDACTONE) 25 mg tablet  Self No No   Sig: Take 1 tablet (25 mg total) by mouth daily after breakfast   Patient not taking: Reported on 9/19/2019   tiotropium (SPIRIVA) 18 mcg inhalation capsule  Spouse/Significant Other Yes No   Sig: Place 18 mcg into inhaler and inhale      Facility-Administered Medications: None       Past Medical History:   Diagnosis Date    Appendicolith     Ascending aortic aneurysm (HCC)     3 7    Asthma     BPH (benign prostatic hyperplasia)     CAD (coronary artery disease)     noted on CT scan    COPD (chronic obstructive pulmonary disease) (HCC)     Descending thoracic aortic aneurysm (HCC)     Diverticulosis     Former tobacco use     GERD (gastroesophageal reflux disease)     History of DVT (deep vein thrombosis)     Left leg    History of transfusion     Hypertension     Inguinal hernia     right    Nephrolithiasis     Oxygen dependent     2LNC    Prostate calculus     PVD (peripheral vascular disease) (HCC)     Ulcer     Varicose vein of leg     b/l       Past Surgical History:   Procedure Laterality Date    CARDIAC SURGERY      ESOPHAGOGASTRODUODENOSCOPY      HERNIA REPAIR Right 1/21/2019    Procedure: REPAIR HERNIA INGUINAL WITH MESH;  Surgeon: Hayley Fonseca MD;  Location: [address] [place] [address] MAIN OR;  Service: General    INGUINAL HERNIA REPAIR Bilateral     IR TEVAR  12/27/2018    AL ENDOVASC TAA REINCL SUBCL N/A 12/27/2018    Procedure: TEVAR - endovascular thoracic aortic aneurysm repair;  Surgeon: Roya Ortega MD;  Location:  MAIN OR;  Service: Vascular    THORACIC AORTIC ANEURYSM REPAIR  12/27/2018    VARICOSE VEIN SURGERY Bilateral     vein stripping       Family History   Problem Relation Age of Onset    Tuberculosis Mother     No Known Problems Father     Cancer Sister     Diabetes Family     Hypertension Family      I have reviewed and agree with the history as documented  Social History     Tobacco Use    Smoking status: Former Smoker     Packs/day: 1 00     Years: 35 00     Pack years: 35 00     Start date:      Last attempt to quit:      Years since quittin 8    Smokeless tobacco: Never Used   Substance Use Topics    Alcohol use: Never     Frequency: Patient refused    Drug use: No        Review of Systems   Constitutional: Negative for chills and fever  HENT: Negative for sore throat  Respiratory: Negative for cough and shortness of breath  Cardiovascular: Negative for chest pain and palpitations  Gastrointestinal: Negative for abdominal pain, diarrhea, nausea and vomiting  Endocrine: Negative for cold intolerance and heat intolerance  Genitourinary: Negative for dysuria and flank pain  Musculoskeletal: Negative for back pain  Skin: Negative for rash  Allergic/Immunologic: Negative for immunocompromised state  Neurological: Negative for dizziness, weakness, light-headedness, numbness and headaches  Hematological: Negative for adenopathy  Psychiatric/Behavioral: The patient is not nervous/anxious  Physical Exam  Physical Exam   Constitutional: He is oriented to person, place, and time  He appears well-developed and well-nourished  No distress  HENT:   Head: Normocephalic and atraumatic  Eyes: Pupils are equal, round, and reactive to light  EOM are normal    Neck: Normal range of motion  Neck supple  Cardiovascular: Normal rate and regular rhythm  Pulmonary/Chest: Effort normal and breath sounds normal  No respiratory distress  Abdominal: Soft  He exhibits no distension  There is no tenderness  Musculoskeletal: Normal range of motion  He exhibits no edema  Neurological: He is alert and oriented to person, place, and time     Skin: Skin is warm and dry  Psychiatric: He has a normal mood and affect         Vital Signs  ED Triage Vitals [11/17/19 1914]   Temperature Pulse Respirations Blood Pressure SpO2   97 7 °F (36 5 °C) 69 (!) 28 (!) 202/100 92 %      Temp Source Heart Rate Source Patient Position - Orthostatic VS BP Location FiO2 (%)   Tympanic Monitor Lying Left arm --      Pain Score       No Pain           Vitals:    11/17/19 1930 11/17/19 2000 11/17/19 2030 11/17/19 2100   BP: (!) 207/100 170/93 165/93 (!) 179/95   Pulse: 60 56 55 58   Patient Position - Orthostatic VS: Lying Lying Lying Sitting         Visual Acuity      ED Medications  Medications - No data to display    Diagnostic Studies  Results Reviewed     Procedure Component Value Units Date/Time    Troponin I [[id number]]  (Normal) Collected:  11/17/19 1943    Lab Status:  Final result Specimen:  Blood from Arm, Right Updated:  11/17/19 2012     Troponin I <0 01 ng/mL     Narrative:       Hemolysis    Comprehensive metabolic panel [[id number]]  (Abnormal) Collected:  11/17/19 1943    Lab Status:  Final result Specimen:  Blood from Arm, Right Updated:  11/17/19 2008     Sodium 142 mmol/L      Potassium 5 0 mmol/L      Chloride 95 mmol/L      CO2 42 mmol/L      ANION GAP 5 mmol/L      BUN 19 mg/dL      Creatinine 0 92 mg/dL      Glucose 134 mg/dL      Calcium 9 1 mg/dL      AST 45 U/L      ALT 15 U/L      Alkaline Phosphatase 60 U/L      Total Protein 8 5 g/dL      Albumin 4 4 g/dL      Total Bilirubin 1 30 mg/dL      eGFR 77 ml/min/1 73sq m     Narrative:       Hemolysis  National Kidney Disease Foundation guidelines for Chronic Kidney Disease (CKD):     Stage 1 with normal or high GFR (GFR > 90 mL/min/1 73 square meters)    Stage 2 Mild CKD (GFR = 60-89 mL/min/1 73 square meters)    Stage 3A Moderate CKD (GFR = 45-59 mL/min/1 73 square meters)    Stage 3B Moderate CKD (GFR = 30-44 mL/min/1 73 square meters)    Stage 4 Severe CKD (GFR = 15-29 mL/min/1 73 square meters)    Stage 5 End Stage CKD (GFR <15 mL/min/1 73 square meters)  Note: GFR calculation is accurate only with a steady state creatinine    CBC and differential [[id number]]  (Abnormal) Collected:  11/17/19 1943    Lab Status:  Final result Specimen:  Blood from Arm, Right Updated:  11/17/19 1954     WBC 7 30 Thousand/uL      RBC 5 15 Million/uL      Hemoglobin 16 3 g/dL      Hematocrit 50 6 %      MCV 98 fL      MCH 31 6 pg      MCHC 32 2 g/dL      RDW 14 7 %      MPV 7 7 fL      Platelets 486 Thousands/uL                  No orders to display              Procedures  ECG 12 Lead Documentation Only  Date/Time: 11/17/2019 10:35 PM  Performed by: Ben Steen MD  Authorized by: Ben Steen MD     Indications / Diagnosis:  Hypertension  ECG reviewed by me, the ED Provider: yes    Patient location:  ED  Interpretation:     Interpretation: non-specific    Rate:     ECG rate:  59 bpm    ECG rate assessment: bradycardic    Rhythm:     Rhythm: sinus bradycardia    Ectopy:     Ectopy: none    ST segments:     ST segments:  Non-specific  T waves:     T waves: normal             ED Course                               MDM  Number of Diagnoses or Management Options  Hypertension:   Diagnosis management comments: The patient is very well appearing with a benign exam and stable vital signs other than a moderately elevated blood pressure  He has no appreciable complaints  Will check EKG, basic labs, and troponin  Will continue to monitor in the ED  20:53 Workup unremarkable  BP trending down  The patient remains entirely asymptomatic  Will discharge to home with close PCP follow up later this week for reassessment and possible medication adjustment  Wife is agreeable to this plan  Strict return precautions provided         Amount and/or Complexity of Data Reviewed  Clinical lab tests: ordered and reviewed    Patient Progress  Patient progress: stable      Disposition  Final diagnoses:   Hypertension     Time reflects when diagnosis was documented in both MDM as applicable and the Disposition within this note     Time User Action Codes Description Comment    11/17/2019  8:53 PM Rosana Haley Hypertension       ED Disposition     ED Disposition Condition Date/Time Comment    Discharge Stable Sun Nov 17, 2019  2:27 PM Yissel Rocha discharge to home/self care              Follow-up Information     Follow up With Specialties Details Why Contact Info    Josefina Dupree MD Family Medicine Schedule an appointment as soon as possible for a visit   [place] [address] [facility]  [phone]            Discharge Medication List as of 11/17/2019  8:53 PM      CONTINUE these medications which have NOT CHANGED    Details   acetaminophen (TYLENOL) 325 mg tablet Take 2 tablets (650 mg total) by mouth every 6 (six) hours as needed for fever (temperature greater than 101 F), Starting Mon 12/31/2018, Print      albuterol (2 5 mg/3 mL) 0 083 % nebulizer solution Take 1 vial (2 5 mg total) by nebulization every 6 (six) hours as needed for wheezing or shortness of breath, Starting Tue 6/26/2018, Normal      albuterol (VENTOLIN HFA) 90 mcg/act inhaler Inhale 2 puffs, Starting Tue 8/8/2017, Historical Med      amLODIPine (NORVASC) 10 mg tablet Take 10 mg by mouth, Starting Tue 1/1/2019, Historical Med      aspirin (ECOTRIN LOW STRENGTH) 81 mg EC tablet Take 1 tablet (81 mg total) by mouth daily, Starting Tue 1/1/2019, Print      atorvastatin (LIPITOR) 10 mg tablet Take 1 tablet (10 mg total) by mouth daily, Starting Thu 7/18/2019, Normal      budesonide-formoterol (SYMBICORT) 160-4 5 mcg/act inhaler Inhale 2 puffs 2 (two) times a day Rinse mouth after use , Historical Med      hydrochlorothiazide (HYDRODIURIL) 12 5 mg tablet Take 1 tablet (12 5 mg total) by mouth daily after breakfast, Starting Wed 5/15/2019, Normal      losartan (COZAAR) 100 MG tablet Take 1 tablet (100 mg total) by mouth daily after dinner, Starting Fri 3/22/2019, No Print metoprolol tartrate (LOPRESSOR) 50 mg tablet Take 1 tablet (50 mg total) by mouth every 12 (twelve) hours, Starting Fri 3/22/2019, Normal      pantoprazole (PROTONIX) 40 mg tablet Take 1 tablet (40 mg total) by mouth daily, Starting Mon 8/20/2018, Normal      spironolactone (ALDACTONE) 25 mg tablet Take 1 tablet (25 mg total) by mouth daily after breakfast, Starting Wed 5/15/2019, Normal      tiotropium (SPIRIVA) 18 mcg inhalation capsule Place 18 mcg into inhaler and inhale, Starting Tue 9/18/2018, Until Wed 9/18/2019, Historical Med           No discharge procedures on file      ED Provider  Electronically Signed by           Jojo Little MD  11/17/19 0602

## 2020-05-28 ENCOUNTER — APPOINTMENT (EMERGENCY)
Dept: CT IMAGING | Facility: HOSPITAL | Age: 85
End: 2020-05-28
Payer: COMMERCIAL

## 2020-05-28 ENCOUNTER — APPOINTMENT (EMERGENCY)
Dept: RADIOLOGY | Facility: HOSPITAL | Age: 85
End: 2020-05-28
Payer: COMMERCIAL

## 2020-05-28 ENCOUNTER — HOSPITAL ENCOUNTER (OUTPATIENT)
Facility: HOSPITAL | Age: 85
Setting detail: OBSERVATION
Discharge: LEFT BEFORE DISCHARGE | End: 2020-05-28
Attending: EMERGENCY MEDICINE | Admitting: FAMILY MEDICINE
Payer: COMMERCIAL

## 2020-05-28 VITALS
OXYGEN SATURATION: 96 % | TEMPERATURE: 97.6 F | WEIGHT: 150 LBS | HEART RATE: 58 BPM | SYSTOLIC BLOOD PRESSURE: 174 MMHG | BODY MASS INDEX: 26.57 KG/M2 | DIASTOLIC BLOOD PRESSURE: 99 MMHG | RESPIRATION RATE: 16 BRPM

## 2020-05-28 DIAGNOSIS — G45.9 TIA (TRANSIENT ISCHEMIC ATTACK): Primary | ICD-10-CM

## 2020-05-28 DIAGNOSIS — R93.89 ABNORMAL CT OF THE CHEST: ICD-10-CM

## 2020-05-28 DIAGNOSIS — D69.6 THROMBOCYTOPENIA (HCC): ICD-10-CM

## 2020-05-28 DIAGNOSIS — I16.0 HYPERTENSIVE URGENCY: ICD-10-CM

## 2020-05-28 LAB
ALBUMIN SERPL BCP-MCNC: 4 G/DL (ref 3–5.2)
ALP SERPL-CCNC: 67 U/L (ref 43–122)
ALT SERPL W P-5'-P-CCNC: 16 U/L (ref 9–52)
ANION GAP SERPL CALCULATED.3IONS-SCNC: 3 MMOL/L (ref 5–14)
APTT PPP: 30 SECONDS (ref 23–37)
AST SERPL W P-5'-P-CCNC: 26 U/L (ref 17–59)
ATRIAL RATE: 54 BPM
BILIRUB SERPL-MCNC: 0.6 MG/DL
BILIRUB UR QL STRIP: NEGATIVE
BUN SERPL-MCNC: 20 MG/DL (ref 5–25)
CALCIUM SERPL-MCNC: 9.3 MG/DL (ref 8.4–10.2)
CHLORIDE SERPL-SCNC: 99 MMOL/L (ref 97–108)
CLARITY UR: CLEAR
CO2 SERPL-SCNC: 39 MMOL/L (ref 22–30)
COLOR UR: NORMAL
CREAT SERPL-MCNC: 0.96 MG/DL (ref 0.7–1.5)
EOSINOPHIL # BLD AUTO: 1.73 THOUSAND/UL (ref 0–0.4)
EOSINOPHIL NFR BLD MANUAL: 23 % (ref 0–6)
ERYTHROCYTE [DISTWIDTH] IN BLOOD BY AUTOMATED COUNT: 13.7 %
GFR SERPL CREATININE-BSD FRML MDRD: 73 ML/MIN/1.73SQ M
GLUCOSE SERPL-MCNC: 147 MG/DL (ref 70–99)
GLUCOSE UR STRIP-MCNC: NEGATIVE MG/DL
HCT VFR BLD AUTO: 44.8 % (ref 41–53)
HGB BLD-MCNC: 15 G/DL (ref 13.5–17.5)
HGB UR QL STRIP.AUTO: NEGATIVE
INR PPP: 1.06 (ref 0.84–1.19)
KETONES UR STRIP-MCNC: NEGATIVE MG/DL
LEUKOCYTE ESTERASE UR QL STRIP: NEGATIVE
LYMPHOCYTES # BLD AUTO: 1.88 THOUSAND/UL (ref 0.5–4)
LYMPHOCYTES # BLD AUTO: 25 % (ref 25–45)
MAGNESIUM SERPL-MCNC: 2.3 MG/DL (ref 1.6–2.3)
MCH RBC QN AUTO: 33.5 PG (ref 26–34)
MCHC RBC AUTO-ENTMCNC: 33.4 G/DL (ref 31–36)
MCV RBC AUTO: 100 FL (ref 80–100)
MONOCYTES # BLD AUTO: 0.6 THOUSAND/UL (ref 0.2–0.9)
MONOCYTES NFR BLD AUTO: 8 % (ref 1–10)
NEUTS SEG # BLD: 3.3 THOUSAND/UL (ref 1.8–7.8)
NEUTS SEG NFR BLD AUTO: 44 %
NITRITE UR QL STRIP: NEGATIVE
NT-PROBNP SERPL-MCNC: 597 PG/ML (ref 0–299)
P AXIS: 63 DEGREES
PH UR STRIP.AUTO: 7 [PH]
PLATELET # BLD AUTO: 133 THOUSANDS/UL (ref 150–450)
PLATELET BLD QL SMEAR: ABNORMAL
PMV BLD AUTO: 7.3 FL (ref 8.9–12.7)
POTASSIUM SERPL-SCNC: 4.2 MMOL/L (ref 3.6–5)
PR INTERVAL: 162 MS
PROT SERPL-MCNC: 7.7 G/DL (ref 5.9–8.4)
PROT UR STRIP-MCNC: NEGATIVE MG/DL
PROTHROMBIN TIME: 13.2 SECONDS (ref 11.6–14.5)
QRS AXIS: 66 DEGREES
QRSD INTERVAL: 86 MS
QT INTERVAL: 452 MS
QTC INTERVAL: 428 MS
RBC # BLD AUTO: 4.47 MILLION/UL (ref 4.5–5.9)
RBC MORPH BLD: NORMAL
SODIUM SERPL-SCNC: 141 MMOL/L (ref 137–147)
SP GR UR STRIP.AUTO: 1.01 (ref 1–1.04)
T WAVE AXIS: 51 DEGREES
TOTAL CELLS COUNTED SPEC: 100
TROPONIN I SERPL-MCNC: <0.01 NG/ML (ref 0–0.03)
UROBILINOGEN UA: NEGATIVE MG/DL
VENTRICULAR RATE: 54 BPM
WBC # BLD AUTO: 7.5 THOUSAND/UL (ref 4.5–11)

## 2020-05-28 PROCEDURE — 85027 COMPLETE CBC AUTOMATED: CPT | Performed by: EMERGENCY MEDICINE

## 2020-05-28 PROCEDURE — 96361 HYDRATE IV INFUSION ADD-ON: CPT

## 2020-05-28 PROCEDURE — 99285 EMERGENCY DEPT VISIT HI MDM: CPT

## 2020-05-28 PROCEDURE — 71250 CT THORAX DX C-: CPT

## 2020-05-28 PROCEDURE — 93005 ELECTROCARDIOGRAM TRACING: CPT

## 2020-05-28 PROCEDURE — 83880 ASSAY OF NATRIURETIC PEPTIDE: CPT | Performed by: EMERGENCY MEDICINE

## 2020-05-28 PROCEDURE — 70498 CT ANGIOGRAPHY NECK: CPT

## 2020-05-28 PROCEDURE — 80053 COMPREHEN METABOLIC PANEL: CPT | Performed by: EMERGENCY MEDICINE

## 2020-05-28 PROCEDURE — 84484 ASSAY OF TROPONIN QUANT: CPT | Performed by: EMERGENCY MEDICINE

## 2020-05-28 PROCEDURE — 96374 THER/PROPH/DIAG INJ IV PUSH: CPT

## 2020-05-28 PROCEDURE — 85610 PROTHROMBIN TIME: CPT | Performed by: EMERGENCY MEDICINE

## 2020-05-28 PROCEDURE — 36415 COLL VENOUS BLD VENIPUNCTURE: CPT | Performed by: EMERGENCY MEDICINE

## 2020-05-28 PROCEDURE — 70496 CT ANGIOGRAPHY HEAD: CPT

## 2020-05-28 PROCEDURE — 85730 THROMBOPLASTIN TIME PARTIAL: CPT | Performed by: EMERGENCY MEDICINE

## 2020-05-28 PROCEDURE — 83735 ASSAY OF MAGNESIUM: CPT | Performed by: EMERGENCY MEDICINE

## 2020-05-28 PROCEDURE — 99285 EMERGENCY DEPT VISIT HI MDM: CPT | Performed by: EMERGENCY MEDICINE

## 2020-05-28 PROCEDURE — 93010 ELECTROCARDIOGRAM REPORT: CPT | Performed by: INTERNAL MEDICINE

## 2020-05-28 PROCEDURE — 85007 BL SMEAR W/DIFF WBC COUNT: CPT | Performed by: EMERGENCY MEDICINE

## 2020-05-28 RX ORDER — METOPROLOL TARTRATE 50 MG/1
50 TABLET, FILM COATED ORAL EVERY 12 HOURS SCHEDULED
Status: CANCELLED | OUTPATIENT
Start: 2020-05-28

## 2020-05-28 RX ORDER — BUDESONIDE AND FORMOTEROL FUMARATE DIHYDRATE 160; 4.5 UG/1; UG/1
2 AEROSOL RESPIRATORY (INHALATION) 2 TIMES DAILY
Status: CANCELLED | OUTPATIENT
Start: 2020-05-28

## 2020-05-28 RX ORDER — AMLODIPINE BESYLATE 5 MG/1
10 TABLET ORAL DAILY
Status: CANCELLED | OUTPATIENT
Start: 2020-05-28

## 2020-05-28 RX ORDER — ALBUTEROL SULFATE 90 UG/1
2 AEROSOL, METERED RESPIRATORY (INHALATION) EVERY 4 HOURS PRN
Status: CANCELLED | OUTPATIENT
Start: 2020-05-28

## 2020-05-28 RX ORDER — LOSARTAN POTASSIUM 50 MG/1
50 TABLET ORAL DAILY
Status: CANCELLED | OUTPATIENT
Start: 2020-05-28

## 2020-05-28 RX ORDER — HYDRALAZINE HYDROCHLORIDE 20 MG/ML
10 INJECTION INTRAMUSCULAR; INTRAVENOUS ONCE
Status: COMPLETED | OUTPATIENT
Start: 2020-05-28 | End: 2020-05-28

## 2020-05-28 RX ORDER — ASPIRIN 81 MG/1
81 TABLET ORAL DAILY
Status: CANCELLED | OUTPATIENT
Start: 2020-05-28

## 2020-05-28 RX ORDER — HYDROCHLOROTHIAZIDE 25 MG/1
12.5 TABLET ORAL
Status: CANCELLED | OUTPATIENT
Start: 2020-05-28

## 2020-05-28 RX ORDER — ACETAMINOPHEN 325 MG/1
650 TABLET ORAL EVERY 6 HOURS PRN
Status: CANCELLED | OUTPATIENT
Start: 2020-05-28

## 2020-05-28 RX ORDER — ATORVASTATIN CALCIUM 20 MG/1
10 TABLET, FILM COATED ORAL DAILY
Status: CANCELLED | OUTPATIENT
Start: 2020-05-28

## 2020-05-28 RX ORDER — HYDRALAZINE HYDROCHLORIDE 20 MG/ML
10 INJECTION INTRAMUSCULAR; INTRAVENOUS EVERY 6 HOURS PRN
Status: CANCELLED | OUTPATIENT
Start: 2020-05-28

## 2020-05-28 RX ORDER — ALBUTEROL SULFATE 2.5 MG/3ML
2.5 SOLUTION RESPIRATORY (INHALATION) EVERY 6 HOURS PRN
Status: CANCELLED | OUTPATIENT
Start: 2020-05-28

## 2020-05-28 RX ADMIN — HYDRALAZINE HYDROCHLORIDE 10 MG: 20 INJECTION INTRAMUSCULAR; INTRAVENOUS at 00:52

## 2020-05-28 RX ADMIN — SODIUM CHLORIDE 1000 ML: 0.9 INJECTION, SOLUTION INTRAVENOUS at 00:53

## 2020-05-28 RX ADMIN — IOHEXOL 100 ML: 350 INJECTION, SOLUTION INTRAVENOUS at 01:33

## 2020-06-11 ENCOUNTER — APPOINTMENT (EMERGENCY)
Dept: RADIOLOGY | Facility: HOSPITAL | Age: 85
DRG: 071 | End: 2020-06-11
Payer: COMMERCIAL

## 2020-06-11 ENCOUNTER — HOSPITAL ENCOUNTER (INPATIENT)
Facility: HOSPITAL | Age: 85
LOS: 4 days | Discharge: HOME HEALTH | DRG: 071 | End: 2020-06-16
Attending: EMERGENCY MEDICINE | Admitting: FAMILY MEDICINE
Payer: COMMERCIAL

## 2020-06-11 ENCOUNTER — APPOINTMENT (EMERGENCY)
Dept: CT IMAGING | Facility: HOSPITAL | Age: 85
DRG: 071 | End: 2020-06-11
Payer: COMMERCIAL

## 2020-06-11 DIAGNOSIS — I50.9 CHF (CONGESTIVE HEART FAILURE) (HCC): ICD-10-CM

## 2020-06-11 DIAGNOSIS — I10 ACCELERATED ESSENTIAL HYPERTENSION: ICD-10-CM

## 2020-06-11 DIAGNOSIS — R13.19 OTHER DYSPHAGIA: ICD-10-CM

## 2020-06-11 DIAGNOSIS — I71.2 THORACIC AORTIC ANEURYSM WITHOUT RUPTURE (HCC): ICD-10-CM

## 2020-06-11 DIAGNOSIS — G45.9 TIA (TRANSIENT ISCHEMIC ATTACK): ICD-10-CM

## 2020-06-11 DIAGNOSIS — Z99.81 OXYGEN DEPENDENT: ICD-10-CM

## 2020-06-11 DIAGNOSIS — K21.9 GASTROESOPHAGEAL REFLUX DISEASE WITHOUT ESOPHAGITIS: ICD-10-CM

## 2020-06-11 DIAGNOSIS — I10 HYPERTENSION, UNSPECIFIED TYPE: ICD-10-CM

## 2020-06-11 DIAGNOSIS — I16.0 HYPERTENSIVE URGENCY: Primary | ICD-10-CM

## 2020-06-11 DIAGNOSIS — R41.82 ALTERED MENTAL STATUS, UNSPECIFIED ALTERED MENTAL STATUS TYPE: ICD-10-CM

## 2020-06-11 DIAGNOSIS — E78.5 HYPERLIPIDEMIA, UNSPECIFIED HYPERLIPIDEMIA TYPE: ICD-10-CM

## 2020-06-11 DIAGNOSIS — R41.82 ALTERED MENTAL STATUS: ICD-10-CM

## 2020-06-11 DIAGNOSIS — J44.9 COPD (CHRONIC OBSTRUCTIVE PULMONARY DISEASE) (HCC): ICD-10-CM

## 2020-06-11 DIAGNOSIS — I50.32 CHRONIC DIASTOLIC CHF (CONGESTIVE HEART FAILURE) (HCC): ICD-10-CM

## 2020-06-11 DIAGNOSIS — R68.81 EARLY SATIETY: ICD-10-CM

## 2020-06-11 LAB
ALBUMIN SERPL BCP-MCNC: 3.8 G/DL (ref 3.5–5)
ALP SERPL-CCNC: 79 U/L (ref 46–116)
ALT SERPL W P-5'-P-CCNC: 22 U/L (ref 12–78)
ANION GAP SERPL CALCULATED.3IONS-SCNC: 1 MMOL/L (ref 4–13)
APTT PPP: 31 SECONDS (ref 23–37)
AST SERPL W P-5'-P-CCNC: 20 U/L (ref 5–45)
BACTERIA UR QL AUTO: ABNORMAL /HPF
BASOPHILS # BLD AUTO: 0.06 THOUSANDS/ΜL (ref 0–0.1)
BASOPHILS NFR BLD AUTO: 1 % (ref 0–1)
BILIRUB SERPL-MCNC: 0.88 MG/DL (ref 0.2–1)
BILIRUB UR QL STRIP: NEGATIVE
BUN SERPL-MCNC: 14 MG/DL (ref 5–25)
CALCIUM SERPL-MCNC: 9.4 MG/DL (ref 8.3–10.1)
CHLORIDE SERPL-SCNC: 102 MMOL/L (ref 100–108)
CK SERPL-CCNC: 52 U/L (ref 39–308)
CLARITY UR: CLEAR
CO2 SERPL-SCNC: 38 MMOL/L (ref 21–32)
COLOR UR: YELLOW
CREAT SERPL-MCNC: 1.07 MG/DL (ref 0.6–1.3)
EOSINOPHIL # BLD AUTO: 1.01 THOUSAND/ΜL (ref 0–0.61)
EOSINOPHIL NFR BLD AUTO: 9 % (ref 0–6)
ERYTHROCYTE [DISTWIDTH] IN BLOOD BY AUTOMATED COUNT: 12.7 % (ref 11.6–15.1)
GFR SERPL CREATININE-BSD FRML MDRD: 64 ML/MIN/1.73SQ M
GLUCOSE SERPL-MCNC: 117 MG/DL (ref 65–140)
GLUCOSE UR STRIP-MCNC: NEGATIVE MG/DL
HCT VFR BLD AUTO: 49.9 % (ref 36.5–49.3)
HGB BLD-MCNC: 15.3 G/DL (ref 12–17)
HGB UR QL STRIP.AUTO: ABNORMAL
IMM GRANULOCYTES # BLD AUTO: 0.02 THOUSAND/UL (ref 0–0.2)
IMM GRANULOCYTES NFR BLD AUTO: 0 % (ref 0–2)
INR PPP: 0.99 (ref 0.84–1.19)
KETONES UR STRIP-MCNC: NEGATIVE MG/DL
LEUKOCYTE ESTERASE UR QL STRIP: NEGATIVE
LYMPHOCYTES # BLD AUTO: 2.54 THOUSANDS/ΜL (ref 0.6–4.47)
LYMPHOCYTES NFR BLD AUTO: 23 % (ref 14–44)
MAGNESIUM SERPL-MCNC: 2.2 MG/DL (ref 1.6–2.6)
MCH RBC QN AUTO: 32.6 PG (ref 26.8–34.3)
MCHC RBC AUTO-ENTMCNC: 30.7 G/DL (ref 31.4–37.4)
MCV RBC AUTO: 106 FL (ref 82–98)
MONOCYTES # BLD AUTO: 1.07 THOUSAND/ΜL (ref 0.17–1.22)
MONOCYTES NFR BLD AUTO: 10 % (ref 4–12)
NEUTROPHILS # BLD AUTO: 6.27 THOUSANDS/ΜL (ref 1.85–7.62)
NEUTS SEG NFR BLD AUTO: 57 % (ref 43–75)
NITRITE UR QL STRIP: NEGATIVE
NON-SQ EPI CELLS URNS QL MICRO: ABNORMAL /HPF
NRBC BLD AUTO-RTO: 0 /100 WBCS
NT-PROBNP SERPL-MCNC: 779 PG/ML
PH UR STRIP.AUTO: 8 [PH] (ref 4.5–8)
PLATELET # BLD AUTO: 138 THOUSANDS/UL (ref 149–390)
PMV BLD AUTO: 9.1 FL (ref 8.9–12.7)
POTASSIUM SERPL-SCNC: 4 MMOL/L (ref 3.5–5.3)
PROT SERPL-MCNC: 8.2 G/DL (ref 6.4–8.2)
PROT UR STRIP-MCNC: NEGATIVE MG/DL
PROTHROMBIN TIME: 13.2 SECONDS (ref 11.6–14.5)
RBC # BLD AUTO: 4.7 MILLION/UL (ref 3.88–5.62)
RBC #/AREA URNS AUTO: ABNORMAL /HPF
SODIUM SERPL-SCNC: 141 MMOL/L (ref 136–145)
SP GR UR STRIP.AUTO: 1.01 (ref 1–1.03)
TROPONIN I SERPL-MCNC: <0.02 NG/ML
UROBILINOGEN UR QL STRIP.AUTO: 0.2 E.U./DL
WBC # BLD AUTO: 10.97 THOUSAND/UL (ref 4.31–10.16)
WBC #/AREA URNS AUTO: ABNORMAL /HPF

## 2020-06-11 PROCEDURE — 85610 PROTHROMBIN TIME: CPT | Performed by: NURSE PRACTITIONER

## 2020-06-11 PROCEDURE — 84484 ASSAY OF TROPONIN QUANT: CPT | Performed by: NURSE PRACTITIONER

## 2020-06-11 PROCEDURE — 82550 ASSAY OF CK (CPK): CPT | Performed by: NURSE PRACTITIONER

## 2020-06-11 PROCEDURE — 83880 ASSAY OF NATRIURETIC PEPTIDE: CPT | Performed by: NURSE PRACTITIONER

## 2020-06-11 PROCEDURE — 85730 THROMBOPLASTIN TIME PARTIAL: CPT | Performed by: NURSE PRACTITIONER

## 2020-06-11 PROCEDURE — 83735 ASSAY OF MAGNESIUM: CPT | Performed by: NURSE PRACTITIONER

## 2020-06-11 PROCEDURE — 99285 EMERGENCY DEPT VISIT HI MDM: CPT | Performed by: NURSE PRACTITIONER

## 2020-06-11 PROCEDURE — 81001 URINALYSIS AUTO W/SCOPE: CPT

## 2020-06-11 PROCEDURE — 93005 ELECTROCARDIOGRAM TRACING: CPT

## 2020-06-11 PROCEDURE — 96374 THER/PROPH/DIAG INJ IV PUSH: CPT

## 2020-06-11 PROCEDURE — 80053 COMPREHEN METABOLIC PANEL: CPT | Performed by: NURSE PRACTITIONER

## 2020-06-11 PROCEDURE — 70450 CT HEAD/BRAIN W/O DYE: CPT

## 2020-06-11 PROCEDURE — 36415 COLL VENOUS BLD VENIPUNCTURE: CPT | Performed by: NURSE PRACTITIONER

## 2020-06-11 PROCEDURE — 71045 X-RAY EXAM CHEST 1 VIEW: CPT

## 2020-06-11 PROCEDURE — 85025 COMPLETE CBC W/AUTO DIFF WBC: CPT | Performed by: NURSE PRACTITIONER

## 2020-06-11 PROCEDURE — 99285 EMERGENCY DEPT VISIT HI MDM: CPT

## 2020-06-11 RX ORDER — HYDRALAZINE HYDROCHLORIDE 20 MG/ML
10 INJECTION INTRAMUSCULAR; INTRAVENOUS ONCE
Status: DISCONTINUED | OUTPATIENT
Start: 2020-06-11 | End: 2020-06-12

## 2020-06-11 RX ORDER — HYDRALAZINE HYDROCHLORIDE 20 MG/ML
10 INJECTION INTRAMUSCULAR; INTRAVENOUS ONCE
Status: COMPLETED | OUTPATIENT
Start: 2020-06-11 | End: 2020-06-11

## 2020-06-11 RX ADMIN — HYDRALAZINE HYDROCHLORIDE 10 MG: 20 INJECTION INTRAMUSCULAR; INTRAVENOUS at 22:48

## 2020-06-12 ENCOUNTER — APPOINTMENT (INPATIENT)
Dept: MRI IMAGING | Facility: HOSPITAL | Age: 85
DRG: 071 | End: 2020-06-12
Payer: COMMERCIAL

## 2020-06-12 PROBLEM — R68.81 EARLY SATIETY: Status: ACTIVE | Noted: 2020-06-12

## 2020-06-12 PROBLEM — R13.10 DYSPHAGIA: Status: ACTIVE | Noted: 2020-06-12

## 2020-06-12 LAB
ANION GAP SERPL CALCULATED.3IONS-SCNC: 5 MMOL/L (ref 4–13)
ARTERIAL PATENCY WRIST A: YES
BASE EXCESS BLDA CALC-SCNC: 3.9 MMOL/L
BASOPHILS # BLD AUTO: 0.09 THOUSANDS/ΜL (ref 0–0.1)
BASOPHILS NFR BLD AUTO: 1 % (ref 0–1)
BUN SERPL-MCNC: 14 MG/DL (ref 5–25)
CALCIUM SERPL-MCNC: 9.6 MG/DL (ref 8.3–10.1)
CHLORIDE SERPL-SCNC: 102 MMOL/L (ref 100–108)
CHOLEST SERPL-MCNC: 177 MG/DL (ref 50–200)
CO2 SERPL-SCNC: 32 MMOL/L (ref 21–32)
CREAT SERPL-MCNC: 0.95 MG/DL (ref 0.6–1.3)
EOSINOPHIL # BLD AUTO: 0.67 THOUSAND/ΜL (ref 0–0.61)
EOSINOPHIL NFR BLD AUTO: 6 % (ref 0–6)
ERYTHROCYTE [DISTWIDTH] IN BLOOD BY AUTOMATED COUNT: 12.6 % (ref 11.6–15.1)
EST. AVERAGE GLUCOSE BLD GHB EST-MCNC: 134 MG/DL
GFR SERPL CREATININE-BSD FRML MDRD: 74 ML/MIN/1.73SQ M
GLUCOSE P FAST SERPL-MCNC: 109 MG/DL (ref 65–99)
GLUCOSE SERPL-MCNC: 109 MG/DL (ref 65–140)
HBA1C MFR BLD: 6.3 %
HCO3 BLDA-SCNC: 29.1 MMOL/L (ref 22–28)
HCT VFR BLD AUTO: 51.4 % (ref 36.5–49.3)
HDLC SERPL-MCNC: 47 MG/DL
HGB BLD-MCNC: 15.7 G/DL (ref 12–17)
IMM GRANULOCYTES # BLD AUTO: 0.03 THOUSAND/UL (ref 0–0.2)
IMM GRANULOCYTES NFR BLD AUTO: 0 % (ref 0–2)
LDLC SERPL CALC-MCNC: 117 MG/DL (ref 0–100)
LYMPHOCYTES # BLD AUTO: 2.27 THOUSANDS/ΜL (ref 0.6–4.47)
LYMPHOCYTES NFR BLD AUTO: 20 % (ref 14–44)
MCH RBC QN AUTO: 32.2 PG (ref 26.8–34.3)
MCHC RBC AUTO-ENTMCNC: 30.5 G/DL (ref 31.4–37.4)
MCV RBC AUTO: 105 FL (ref 82–98)
MONOCYTES # BLD AUTO: 1.04 THOUSAND/ΜL (ref 0.17–1.22)
MONOCYTES NFR BLD AUTO: 9 % (ref 4–12)
NASAL CANNULA: ABNORMAL
NEUTROPHILS # BLD AUTO: 7.34 THOUSANDS/ΜL (ref 1.85–7.62)
NEUTS SEG NFR BLD AUTO: 64 % (ref 43–75)
NONHDLC SERPL-MCNC: 130 MG/DL
NRBC BLD AUTO-RTO: 0 /100 WBCS
O2 CT BLDA-SCNC: 21.7 ML/DL (ref 16–23)
OXYHGB MFR BLDA: 92.6 % (ref 94–97)
PCO2 BLDA: 45.2 MM HG (ref 36–44)
PH BLDA: 7.43 [PH] (ref 7.35–7.45)
PLATELET # BLD AUTO: 143 THOUSANDS/UL (ref 149–390)
PMV BLD AUTO: 9.3 FL (ref 8.9–12.7)
PO2 BLDA: 67.3 MM HG (ref 75–129)
POTASSIUM SERPL-SCNC: 4.3 MMOL/L (ref 3.5–5.3)
RBC # BLD AUTO: 4.88 MILLION/UL (ref 3.88–5.62)
SODIUM SERPL-SCNC: 139 MMOL/L (ref 136–145)
SPECIMEN SOURCE: ABNORMAL
TRIGL SERPL-MCNC: 65 MG/DL
TSH SERPL DL<=0.05 MIU/L-ACNC: 0.73 UIU/ML (ref 0.36–3.74)
WBC # BLD AUTO: 11.44 THOUSAND/UL (ref 4.31–10.16)

## 2020-06-12 PROCEDURE — 94762 N-INVAS EAR/PLS OXIMTRY CONT: CPT

## 2020-06-12 PROCEDURE — 80061 LIPID PANEL: CPT | Performed by: PHYSICIAN ASSISTANT

## 2020-06-12 PROCEDURE — 85025 COMPLETE CBC W/AUTO DIFF WBC: CPT | Performed by: PHYSICIAN ASSISTANT

## 2020-06-12 PROCEDURE — 70551 MRI BRAIN STEM W/O DYE: CPT

## 2020-06-12 PROCEDURE — 82805 BLOOD GASES W/O2 SATURATION: CPT | Performed by: PHYSICIAN ASSISTANT

## 2020-06-12 PROCEDURE — 80048 BASIC METABOLIC PNL TOTAL CA: CPT | Performed by: PHYSICIAN ASSISTANT

## 2020-06-12 PROCEDURE — 92610 EVALUATE SWALLOWING FUNCTION: CPT

## 2020-06-12 PROCEDURE — 36600 WITHDRAWAL OF ARTERIAL BLOOD: CPT

## 2020-06-12 PROCEDURE — 84443 ASSAY THYROID STIM HORMONE: CPT | Performed by: PHYSICIAN ASSISTANT

## 2020-06-12 PROCEDURE — 99220 PR INITIAL OBSERVATION CARE/DAY 70 MINUTES: CPT | Performed by: PHYSICIAN ASSISTANT

## 2020-06-12 PROCEDURE — 99223 1ST HOSP IP/OBS HIGH 75: CPT | Performed by: PSYCHIATRY & NEUROLOGY

## 2020-06-12 PROCEDURE — 83036 HEMOGLOBIN GLYCOSYLATED A1C: CPT | Performed by: PHYSICIAN ASSISTANT

## 2020-06-12 RX ORDER — LORAZEPAM 2 MG/ML
0.5 INJECTION INTRAMUSCULAR
Status: COMPLETED | OUTPATIENT
Start: 2020-06-12 | End: 2020-06-12

## 2020-06-12 RX ORDER — ATORVASTATIN CALCIUM 10 MG/1
10 TABLET, FILM COATED ORAL
Status: DISCONTINUED | OUTPATIENT
Start: 2020-06-12 | End: 2020-06-16 | Stop reason: HOSPADM

## 2020-06-12 RX ORDER — HYDROCHLOROTHIAZIDE 25 MG/1
25 TABLET ORAL
Status: DISCONTINUED | OUTPATIENT
Start: 2020-06-13 | End: 2020-06-12

## 2020-06-12 RX ORDER — METOPROLOL TARTRATE 50 MG/1
50 TABLET, FILM COATED ORAL EVERY 12 HOURS SCHEDULED
Status: DISCONTINUED | OUTPATIENT
Start: 2020-06-12 | End: 2020-06-12

## 2020-06-12 RX ORDER — HYDROCHLOROTHIAZIDE 12.5 MG/1
12.5 TABLET ORAL ONCE
Status: COMPLETED | OUTPATIENT
Start: 2020-06-12 | End: 2020-06-12

## 2020-06-12 RX ORDER — LOSARTAN POTASSIUM 50 MG/1
100 TABLET ORAL
Status: DISCONTINUED | OUTPATIENT
Start: 2020-06-12 | End: 2020-06-12

## 2020-06-12 RX ORDER — ALBUTEROL SULFATE 2.5 MG/3ML
2.5 SOLUTION RESPIRATORY (INHALATION) EVERY 6 HOURS PRN
Status: DISCONTINUED | OUTPATIENT
Start: 2020-06-12 | End: 2020-06-16 | Stop reason: HOSPADM

## 2020-06-12 RX ORDER — HYDRALAZINE HYDROCHLORIDE 20 MG/ML
20 INJECTION INTRAMUSCULAR; INTRAVENOUS ONCE
Status: DISCONTINUED | OUTPATIENT
Start: 2020-06-12 | End: 2020-06-12

## 2020-06-12 RX ORDER — ACETAMINOPHEN 325 MG/1
650 TABLET ORAL EVERY 6 HOURS PRN
Status: DISCONTINUED | OUTPATIENT
Start: 2020-06-12 | End: 2020-06-16 | Stop reason: HOSPADM

## 2020-06-12 RX ORDER — HYDRALAZINE HYDROCHLORIDE 20 MG/ML
5 INJECTION INTRAMUSCULAR; INTRAVENOUS EVERY 6 HOURS PRN
Status: DISCONTINUED | OUTPATIENT
Start: 2020-06-12 | End: 2020-06-16 | Stop reason: HOSPADM

## 2020-06-12 RX ORDER — OLANZAPINE 10 MG/1
2.5 INJECTION, POWDER, LYOPHILIZED, FOR SOLUTION INTRAMUSCULAR
Status: DISCONTINUED | OUTPATIENT
Start: 2020-06-12 | End: 2020-06-16 | Stop reason: HOSPADM

## 2020-06-12 RX ORDER — PANTOPRAZOLE SODIUM 40 MG/1
40 TABLET, DELAYED RELEASE ORAL
Status: DISCONTINUED | OUTPATIENT
Start: 2020-06-12 | End: 2020-06-16 | Stop reason: HOSPADM

## 2020-06-12 RX ORDER — ONDANSETRON 2 MG/ML
4 INJECTION INTRAMUSCULAR; INTRAVENOUS EVERY 6 HOURS PRN
Status: DISCONTINUED | OUTPATIENT
Start: 2020-06-12 | End: 2020-06-16 | Stop reason: HOSPADM

## 2020-06-12 RX ORDER — BENZONATATE 100 MG/1
100 CAPSULE ORAL 3 TIMES DAILY PRN
Status: DISCONTINUED | OUTPATIENT
Start: 2020-06-12 | End: 2020-06-16 | Stop reason: HOSPADM

## 2020-06-12 RX ORDER — ASPIRIN 81 MG/1
81 TABLET ORAL DAILY
Status: DISCONTINUED | OUTPATIENT
Start: 2020-06-12 | End: 2020-06-16 | Stop reason: HOSPADM

## 2020-06-12 RX ORDER — HYDROCHLOROTHIAZIDE 12.5 MG/1
12.5 TABLET ORAL
Status: DISCONTINUED | OUTPATIENT
Start: 2020-06-12 | End: 2020-06-12

## 2020-06-12 RX ORDER — AMLODIPINE BESYLATE 10 MG/1
10 TABLET ORAL DAILY
Status: DISCONTINUED | OUTPATIENT
Start: 2020-06-12 | End: 2020-06-12

## 2020-06-12 RX ORDER — BUDESONIDE AND FORMOTEROL FUMARATE DIHYDRATE 160; 4.5 UG/1; UG/1
2 AEROSOL RESPIRATORY (INHALATION) 2 TIMES DAILY
Status: DISCONTINUED | OUTPATIENT
Start: 2020-06-12 | End: 2020-06-16 | Stop reason: HOSPADM

## 2020-06-12 RX ADMIN — ASPIRIN 81 MG: 81 TABLET, COATED ORAL at 12:06

## 2020-06-12 RX ADMIN — HYDRALAZINE HYDROCHLORIDE 5 MG: 20 INJECTION INTRAMUSCULAR; INTRAVENOUS at 07:54

## 2020-06-12 RX ADMIN — METOPROLOL TARTRATE 50 MG: 50 TABLET, FILM COATED ORAL at 01:38

## 2020-06-12 RX ADMIN — OLANZAPINE 2.5 MG: 10 INJECTION, POWDER, FOR SOLUTION INTRAMUSCULAR at 21:23

## 2020-06-12 RX ADMIN — WATER: 1 INJECTION INTRAMUSCULAR; INTRAVENOUS; SUBCUTANEOUS at 21:23

## 2020-06-12 RX ADMIN — ENOXAPARIN SODIUM 40 MG: 40 INJECTION SUBCUTANEOUS at 08:56

## 2020-06-12 RX ADMIN — HYDROCHLOROTHIAZIDE 12.5 MG: 12.5 TABLET ORAL at 07:55

## 2020-06-12 RX ADMIN — LORAZEPAM 0.5 MG: 2 INJECTION INTRAMUSCULAR; INTRAVENOUS at 22:38

## 2020-06-12 RX ADMIN — METOPROLOL TARTRATE 50 MG: 50 TABLET, FILM COATED ORAL at 12:07

## 2020-06-12 RX ADMIN — OLANZAPINE 2.5 MG: 10 INJECTION, POWDER, FOR SOLUTION INTRAMUSCULAR at 13:54

## 2020-06-12 RX ADMIN — HYDROCHLOROTHIAZIDE 12.5 MG: 12.5 TABLET ORAL at 08:56

## 2020-06-12 RX ADMIN — AMLODIPINE BESYLATE 10 MG: 10 TABLET ORAL at 12:06

## 2020-06-13 ENCOUNTER — APPOINTMENT (INPATIENT)
Dept: RADIOLOGY | Facility: HOSPITAL | Age: 85
DRG: 071 | End: 2020-06-13
Payer: COMMERCIAL

## 2020-06-13 PROBLEM — G93.41 ACUTE METABOLIC ENCEPHALOPATHY: Status: ACTIVE | Noted: 2020-06-13

## 2020-06-13 LAB
AMMONIA PLAS-SCNC: 15 UMOL/L (ref 11–35)
ATRIAL RATE: 59 BPM
BACTERIA UR QL AUTO: ABNORMAL /HPF
BILIRUB UR QL STRIP: NEGATIVE
CHOLEST SERPL-MCNC: 184 MG/DL (ref 50–200)
CLARITY UR: CLEAR
COLOR UR: YELLOW
EST. AVERAGE GLUCOSE BLD GHB EST-MCNC: 131 MG/DL
FOLATE SERPL-MCNC: >20 NG/ML (ref 3.1–17.5)
GLUCOSE UR STRIP-MCNC: NEGATIVE MG/DL
HBA1C MFR BLD: 6.2 %
HCYS SERPL-SCNC: 9 UMOL/L (ref 5.3–14.2)
HDLC SERPL-MCNC: 48 MG/DL
HGB UR QL STRIP.AUTO: NEGATIVE
KETONES UR STRIP-MCNC: ABNORMAL MG/DL
LDLC SERPL CALC-MCNC: 120 MG/DL (ref 0–100)
LEUKOCYTE ESTERASE UR QL STRIP: NEGATIVE
NITRITE UR QL STRIP: NEGATIVE
NON-SQ EPI CELLS URNS QL MICRO: ABNORMAL /HPF
P AXIS: 69 DEGREES
PH UR STRIP.AUTO: 7 [PH]
PR INTERVAL: 162 MS
PROCALCITONIN SERPL-MCNC: 0.17 NG/ML
PROT UR STRIP-MCNC: NEGATIVE MG/DL
QRS AXIS: 90 DEGREES
QRSD INTERVAL: 82 MS
QT INTERVAL: 418 MS
QTC INTERVAL: 413 MS
RBC #/AREA URNS AUTO: ABNORMAL /HPF
SARS-COV-2 RNA RESP QL NAA+PROBE: NEGATIVE
SP GR UR STRIP.AUTO: 1.01 (ref 1–1.03)
T WAVE AXIS: 77 DEGREES
TRIGL SERPL-MCNC: 81 MG/DL
UROBILINOGEN UR QL STRIP.AUTO: 0.2 E.U./DL
VENTRICULAR RATE: 59 BPM
VIT B12 SERPL-MCNC: 552 PG/ML (ref 100–900)
WBC #/AREA URNS AUTO: ABNORMAL /HPF

## 2020-06-13 PROCEDURE — 94640 AIRWAY INHALATION TREATMENT: CPT

## 2020-06-13 PROCEDURE — 83918 ORGANIC ACIDS TOTAL QUANT: CPT | Performed by: PSYCHIATRY & NEUROLOGY

## 2020-06-13 PROCEDURE — 80061 LIPID PANEL: CPT | Performed by: FAMILY MEDICINE

## 2020-06-13 PROCEDURE — 82140 ASSAY OF AMMONIA: CPT | Performed by: PSYCHIATRY & NEUROLOGY

## 2020-06-13 PROCEDURE — 99232 SBSQ HOSP IP/OBS MODERATE 35: CPT | Performed by: PSYCHIATRY & NEUROLOGY

## 2020-06-13 PROCEDURE — 99232 SBSQ HOSP IP/OBS MODERATE 35: CPT | Performed by: FAMILY MEDICINE

## 2020-06-13 PROCEDURE — 82746 ASSAY OF FOLIC ACID SERUM: CPT | Performed by: PSYCHIATRY & NEUROLOGY

## 2020-06-13 PROCEDURE — 87635 SARS-COV-2 COVID-19 AMP PRB: CPT | Performed by: FAMILY MEDICINE

## 2020-06-13 PROCEDURE — 93010 ELECTROCARDIOGRAM REPORT: CPT | Performed by: INTERNAL MEDICINE

## 2020-06-13 PROCEDURE — 82607 VITAMIN B-12: CPT | Performed by: PSYCHIATRY & NEUROLOGY

## 2020-06-13 PROCEDURE — 83036 HEMOGLOBIN GLYCOSYLATED A1C: CPT | Performed by: FAMILY MEDICINE

## 2020-06-13 PROCEDURE — 87389 HIV-1 AG W/HIV-1&-2 AB AG IA: CPT | Performed by: PSYCHIATRY & NEUROLOGY

## 2020-06-13 PROCEDURE — 92526 ORAL FUNCTION THERAPY: CPT

## 2020-06-13 PROCEDURE — 81001 URINALYSIS AUTO W/SCOPE: CPT | Performed by: PSYCHIATRY & NEUROLOGY

## 2020-06-13 PROCEDURE — 71045 X-RAY EXAM CHEST 1 VIEW: CPT

## 2020-06-13 PROCEDURE — 84145 PROCALCITONIN (PCT): CPT | Performed by: FAMILY MEDICINE

## 2020-06-13 PROCEDURE — 86592 SYPHILIS TEST NON-TREP QUAL: CPT | Performed by: PSYCHIATRY & NEUROLOGY

## 2020-06-13 PROCEDURE — 84425 ASSAY OF VITAMIN B-1: CPT | Performed by: PSYCHIATRY & NEUROLOGY

## 2020-06-13 PROCEDURE — 83090 ASSAY OF HOMOCYSTEINE: CPT | Performed by: PSYCHIATRY & NEUROLOGY

## 2020-06-13 RX ORDER — METOPROLOL TARTRATE 50 MG/1
50 TABLET, FILM COATED ORAL EVERY 12 HOURS SCHEDULED
Status: DISCONTINUED | OUTPATIENT
Start: 2020-06-13 | End: 2020-06-16 | Stop reason: HOSPADM

## 2020-06-13 RX ORDER — LOSARTAN POTASSIUM 50 MG/1
100 TABLET ORAL
Status: DISCONTINUED | OUTPATIENT
Start: 2020-06-13 | End: 2020-06-16 | Stop reason: HOSPADM

## 2020-06-13 RX ORDER — HYDROCHLOROTHIAZIDE 25 MG/1
25 TABLET ORAL
Status: DISCONTINUED | OUTPATIENT
Start: 2020-06-13 | End: 2020-06-16 | Stop reason: HOSPADM

## 2020-06-13 RX ADMIN — LOSARTAN POTASSIUM 100 MG: 50 TABLET, FILM COATED ORAL at 17:46

## 2020-06-13 RX ADMIN — ASPIRIN 81 MG: 81 TABLET, COATED ORAL at 10:02

## 2020-06-13 RX ADMIN — BUDESONIDE AND FORMOTEROL FUMARATE DIHYDRATE 2 PUFF: 160; 4.5 AEROSOL RESPIRATORY (INHALATION) at 11:00

## 2020-06-13 RX ADMIN — ALBUTEROL SULFATE 2.5 MG: 2.5 SOLUTION RESPIRATORY (INHALATION) at 14:54

## 2020-06-13 RX ADMIN — METOPROLOL TARTRATE 50 MG: 50 TABLET, FILM COATED ORAL at 21:25

## 2020-06-13 RX ADMIN — ENOXAPARIN SODIUM 40 MG: 40 INJECTION SUBCUTANEOUS at 10:02

## 2020-06-13 RX ADMIN — ATORVASTATIN CALCIUM 10 MG: 10 TABLET, FILM COATED ORAL at 17:47

## 2020-06-13 RX ADMIN — METOPROLOL TARTRATE 50 MG: 50 TABLET, FILM COATED ORAL at 10:01

## 2020-06-13 RX ADMIN — BUDESONIDE AND FORMOTEROL FUMARATE DIHYDRATE 2 PUFF: 160; 4.5 AEROSOL RESPIRATORY (INHALATION) at 17:47

## 2020-06-13 RX ADMIN — BENZONATATE 100 MG: 100 CAPSULE ORAL at 14:23

## 2020-06-13 RX ADMIN — TIOTROPIUM BROMIDE 18 MCG: 18 CAPSULE ORAL; RESPIRATORY (INHALATION) at 11:03

## 2020-06-13 RX ADMIN — HYDROCHLOROTHIAZIDE 25 MG: 25 TABLET ORAL at 10:01

## 2020-06-14 LAB
ANION GAP SERPL CALCULATED.3IONS-SCNC: 5 MMOL/L (ref 4–13)
BASOPHILS # BLD AUTO: 0.04 THOUSANDS/ΜL (ref 0–0.1)
BASOPHILS NFR BLD AUTO: 0 % (ref 0–1)
BUN SERPL-MCNC: 26 MG/DL (ref 5–25)
CALCIUM SERPL-MCNC: 9.3 MG/DL (ref 8.3–10.1)
CHLORIDE SERPL-SCNC: 100 MMOL/L (ref 100–108)
CO2 SERPL-SCNC: 35 MMOL/L (ref 21–32)
CREAT SERPL-MCNC: 1 MG/DL (ref 0.6–1.3)
EOSINOPHIL # BLD AUTO: 0.49 THOUSAND/ΜL (ref 0–0.61)
EOSINOPHIL NFR BLD AUTO: 5 % (ref 0–6)
ERYTHROCYTE [DISTWIDTH] IN BLOOD BY AUTOMATED COUNT: 13 % (ref 11.6–15.1)
GFR SERPL CREATININE-BSD FRML MDRD: 70 ML/MIN/1.73SQ M
GLUCOSE SERPL-MCNC: 117 MG/DL (ref 65–140)
HCT VFR BLD AUTO: 49.2 % (ref 36.5–49.3)
HGB BLD-MCNC: 15.1 G/DL (ref 12–17)
IMM GRANULOCYTES # BLD AUTO: 0.06 THOUSAND/UL (ref 0–0.2)
IMM GRANULOCYTES NFR BLD AUTO: 1 % (ref 0–2)
LYMPHOCYTES # BLD AUTO: 1.49 THOUSANDS/ΜL (ref 0.6–4.47)
LYMPHOCYTES NFR BLD AUTO: 14 % (ref 14–44)
MAGNESIUM SERPL-MCNC: 2.3 MG/DL (ref 1.6–2.6)
MCH RBC QN AUTO: 32.6 PG (ref 26.8–34.3)
MCHC RBC AUTO-ENTMCNC: 30.7 G/DL (ref 31.4–37.4)
MCV RBC AUTO: 106 FL (ref 82–98)
MONOCYTES # BLD AUTO: 1.35 THOUSAND/ΜL (ref 0.17–1.22)
MONOCYTES NFR BLD AUTO: 13 % (ref 4–12)
NEUTROPHILS # BLD AUTO: 7.13 THOUSANDS/ΜL (ref 1.85–7.62)
NEUTS SEG NFR BLD AUTO: 67 % (ref 43–75)
NRBC BLD AUTO-RTO: 0 /100 WBCS
PHOSPHATE SERPL-MCNC: 3.3 MG/DL (ref 2.3–4.1)
PLATELET # BLD AUTO: 124 THOUSANDS/UL (ref 149–390)
PMV BLD AUTO: 9.2 FL (ref 8.9–12.7)
POTASSIUM SERPL-SCNC: 3.6 MMOL/L (ref 3.5–5.3)
RBC # BLD AUTO: 4.63 MILLION/UL (ref 3.88–5.62)
SODIUM SERPL-SCNC: 140 MMOL/L (ref 136–145)
WBC # BLD AUTO: 10.56 THOUSAND/UL (ref 4.31–10.16)

## 2020-06-14 PROCEDURE — 85025 COMPLETE CBC W/AUTO DIFF WBC: CPT | Performed by: FAMILY MEDICINE

## 2020-06-14 PROCEDURE — 84100 ASSAY OF PHOSPHORUS: CPT | Performed by: FAMILY MEDICINE

## 2020-06-14 PROCEDURE — 99232 SBSQ HOSP IP/OBS MODERATE 35: CPT | Performed by: PSYCHIATRY & NEUROLOGY

## 2020-06-14 PROCEDURE — 99232 SBSQ HOSP IP/OBS MODERATE 35: CPT | Performed by: FAMILY MEDICINE

## 2020-06-14 PROCEDURE — 80048 BASIC METABOLIC PNL TOTAL CA: CPT | Performed by: FAMILY MEDICINE

## 2020-06-14 PROCEDURE — 83735 ASSAY OF MAGNESIUM: CPT | Performed by: FAMILY MEDICINE

## 2020-06-14 PROCEDURE — 94640 AIRWAY INHALATION TREATMENT: CPT

## 2020-06-14 PROCEDURE — 97163 PT EVAL HIGH COMPLEX 45 MIN: CPT

## 2020-06-14 PROCEDURE — 94760 N-INVAS EAR/PLS OXIMETRY 1: CPT

## 2020-06-14 PROCEDURE — 99223 1ST HOSP IP/OBS HIGH 75: CPT | Performed by: INTERNAL MEDICINE

## 2020-06-14 RX ORDER — LANOLIN ALCOHOL/MO/W.PET/CERES
6 CREAM (GRAM) TOPICAL
Status: DISCONTINUED | OUTPATIENT
Start: 2020-06-14 | End: 2020-06-16 | Stop reason: HOSPADM

## 2020-06-14 RX ADMIN — ALBUTEROL SULFATE 2.5 MG: 2.5 SOLUTION RESPIRATORY (INHALATION) at 04:32

## 2020-06-14 RX ADMIN — MELATONIN 6 MG: 3 TAB ORAL at 21:01

## 2020-06-14 RX ADMIN — BENZONATATE 100 MG: 100 CAPSULE ORAL at 03:29

## 2020-06-14 RX ADMIN — BUDESONIDE AND FORMOTEROL FUMARATE DIHYDRATE 2 PUFF: 160; 4.5 AEROSOL RESPIRATORY (INHALATION) at 17:42

## 2020-06-14 RX ADMIN — METOPROLOL TARTRATE 50 MG: 50 TABLET, FILM COATED ORAL at 21:00

## 2020-06-14 RX ADMIN — ATORVASTATIN CALCIUM 10 MG: 10 TABLET, FILM COATED ORAL at 17:42

## 2020-06-14 RX ADMIN — LOSARTAN POTASSIUM 100 MG: 50 TABLET, FILM COATED ORAL at 17:42

## 2020-06-14 RX ADMIN — ENOXAPARIN SODIUM 40 MG: 40 INJECTION SUBCUTANEOUS at 10:10

## 2020-06-15 ENCOUNTER — APPOINTMENT (INPATIENT)
Dept: RADIOLOGY | Facility: HOSPITAL | Age: 85
DRG: 071 | End: 2020-06-15
Payer: COMMERCIAL

## 2020-06-15 ENCOUNTER — APPOINTMENT (OUTPATIENT)
Dept: NEUROLOGY | Facility: HOSPITAL | Age: 85
DRG: 071 | End: 2020-06-15
Attending: PSYCHIATRY & NEUROLOGY
Payer: COMMERCIAL

## 2020-06-15 LAB
ANION GAP SERPL CALCULATED.3IONS-SCNC: 5 MMOL/L (ref 4–13)
BASOPHILS # BLD AUTO: 0.04 THOUSANDS/ΜL (ref 0–0.1)
BASOPHILS NFR BLD AUTO: 0 % (ref 0–1)
BUN SERPL-MCNC: 33 MG/DL (ref 5–25)
CALCIUM SERPL-MCNC: 9.2 MG/DL (ref 8.3–10.1)
CHLORIDE SERPL-SCNC: 100 MMOL/L (ref 100–108)
CO2 SERPL-SCNC: 35 MMOL/L (ref 21–32)
CREAT SERPL-MCNC: 1.24 MG/DL (ref 0.6–1.3)
EOSINOPHIL # BLD AUTO: 0.89 THOUSAND/ΜL (ref 0–0.61)
EOSINOPHIL NFR BLD AUTO: 10 % (ref 0–6)
ERYTHROCYTE [DISTWIDTH] IN BLOOD BY AUTOMATED COUNT: 12.9 % (ref 11.6–15.1)
GFR SERPL CREATININE-BSD FRML MDRD: 54 ML/MIN/1.73SQ M
GLUCOSE SERPL-MCNC: 127 MG/DL (ref 65–140)
HCT VFR BLD AUTO: 48.1 % (ref 36.5–49.3)
HGB BLD-MCNC: 14.3 G/DL (ref 12–17)
HIV 1+2 AB+HIV1 P24 AG SERPL QL IA: NORMAL
IMM GRANULOCYTES # BLD AUTO: 0.02 THOUSAND/UL (ref 0–0.2)
IMM GRANULOCYTES NFR BLD AUTO: 0 % (ref 0–2)
LYMPHOCYTES # BLD AUTO: 1.55 THOUSANDS/ΜL (ref 0.6–4.47)
LYMPHOCYTES NFR BLD AUTO: 17 % (ref 14–44)
MAGNESIUM SERPL-MCNC: 2.4 MG/DL (ref 1.6–2.6)
MCH RBC QN AUTO: 32.3 PG (ref 26.8–34.3)
MCHC RBC AUTO-ENTMCNC: 29.7 G/DL (ref 31.4–37.4)
MCV RBC AUTO: 109 FL (ref 82–98)
MONOCYTES # BLD AUTO: 1.3 THOUSAND/ΜL (ref 0.17–1.22)
MONOCYTES NFR BLD AUTO: 14 % (ref 4–12)
NEUTROPHILS # BLD AUTO: 5.34 THOUSANDS/ΜL (ref 1.85–7.62)
NEUTS SEG NFR BLD AUTO: 59 % (ref 43–75)
NRBC BLD AUTO-RTO: 0 /100 WBCS
PHOSPHATE SERPL-MCNC: 4 MG/DL (ref 2.3–4.1)
PLATELET # BLD AUTO: 139 THOUSANDS/UL (ref 149–390)
PMV BLD AUTO: 9.1 FL (ref 8.9–12.7)
POTASSIUM SERPL-SCNC: 4.3 MMOL/L (ref 3.5–5.3)
RBC # BLD AUTO: 4.43 MILLION/UL (ref 3.88–5.62)
RPR SER QL: NORMAL
SODIUM SERPL-SCNC: 140 MMOL/L (ref 136–145)
WBC # BLD AUTO: 9.14 THOUSAND/UL (ref 4.31–10.16)

## 2020-06-15 PROCEDURE — 97530 THERAPEUTIC ACTIVITIES: CPT

## 2020-06-15 PROCEDURE — 99232 SBSQ HOSP IP/OBS MODERATE 35: CPT | Performed by: INTERNAL MEDICINE

## 2020-06-15 PROCEDURE — 85025 COMPLETE CBC W/AUTO DIFF WBC: CPT | Performed by: FAMILY MEDICINE

## 2020-06-15 PROCEDURE — 99232 SBSQ HOSP IP/OBS MODERATE 35: CPT | Performed by: PSYCHIATRY & NEUROLOGY

## 2020-06-15 PROCEDURE — 97166 OT EVAL MOD COMPLEX 45 MIN: CPT

## 2020-06-15 PROCEDURE — 84100 ASSAY OF PHOSPHORUS: CPT | Performed by: FAMILY MEDICINE

## 2020-06-15 PROCEDURE — 83735 ASSAY OF MAGNESIUM: CPT | Performed by: FAMILY MEDICINE

## 2020-06-15 PROCEDURE — 95816 EEG AWAKE AND DROWSY: CPT | Performed by: PSYCHIATRY & NEUROLOGY

## 2020-06-15 PROCEDURE — 92526 ORAL FUNCTION THERAPY: CPT

## 2020-06-15 PROCEDURE — 80048 BASIC METABOLIC PNL TOTAL CA: CPT | Performed by: FAMILY MEDICINE

## 2020-06-15 PROCEDURE — 95816 EEG AWAKE AND DROWSY: CPT

## 2020-06-15 RX ORDER — GUAIFENESIN 600 MG
600 TABLET, EXTENDED RELEASE 12 HR ORAL EVERY 12 HOURS SCHEDULED
Status: DISCONTINUED | OUTPATIENT
Start: 2020-06-15 | End: 2020-06-16 | Stop reason: HOSPADM

## 2020-06-15 RX ADMIN — ASPIRIN 81 MG: 81 TABLET, COATED ORAL at 10:04

## 2020-06-15 RX ADMIN — METOPROLOL TARTRATE 50 MG: 50 TABLET, FILM COATED ORAL at 21:37

## 2020-06-15 RX ADMIN — BUDESONIDE AND FORMOTEROL FUMARATE DIHYDRATE 2 PUFF: 160; 4.5 AEROSOL RESPIRATORY (INHALATION) at 10:05

## 2020-06-15 RX ADMIN — GUAIFENESIN 600 MG: 600 TABLET ORAL at 23:42

## 2020-06-15 RX ADMIN — TIOTROPIUM BROMIDE 18 MCG: 18 CAPSULE ORAL; RESPIRATORY (INHALATION) at 10:05

## 2020-06-15 RX ADMIN — MELATONIN 6 MG: 3 TAB ORAL at 21:37

## 2020-06-15 RX ADMIN — ENOXAPARIN SODIUM 40 MG: 40 INJECTION SUBCUTANEOUS at 10:05

## 2020-06-15 RX ADMIN — METOPROLOL TARTRATE 50 MG: 50 TABLET, FILM COATED ORAL at 10:05

## 2020-06-15 RX ADMIN — ATORVASTATIN CALCIUM 10 MG: 10 TABLET, FILM COATED ORAL at 18:46

## 2020-06-15 RX ADMIN — LOSARTAN POTASSIUM 100 MG: 50 TABLET, FILM COATED ORAL at 18:46

## 2020-06-15 RX ADMIN — BUDESONIDE AND FORMOTEROL FUMARATE DIHYDRATE 2 PUFF: 160; 4.5 AEROSOL RESPIRATORY (INHALATION) at 18:46

## 2020-06-15 RX ADMIN — HYDROCHLOROTHIAZIDE 25 MG: 25 TABLET ORAL at 10:04

## 2020-06-16 ENCOUNTER — APPOINTMENT (INPATIENT)
Dept: RADIOLOGY | Facility: HOSPITAL | Age: 85
DRG: 071 | End: 2020-06-16
Payer: COMMERCIAL

## 2020-06-16 VITALS
OXYGEN SATURATION: 94 % | TEMPERATURE: 98.9 F | RESPIRATION RATE: 18 BRPM | BODY MASS INDEX: 24.43 KG/M2 | WEIGHT: 143.08 LBS | HEART RATE: 90 BPM | SYSTOLIC BLOOD PRESSURE: 117 MMHG | HEIGHT: 64 IN | DIASTOLIC BLOOD PRESSURE: 60 MMHG

## 2020-06-16 LAB
ANION GAP SERPL CALCULATED.3IONS-SCNC: 1 MMOL/L (ref 4–13)
BASOPHILS # BLD AUTO: 0.06 THOUSANDS/ΜL (ref 0–0.1)
BASOPHILS NFR BLD AUTO: 1 % (ref 0–1)
BUN SERPL-MCNC: 39 MG/DL (ref 5–25)
CALCIUM SERPL-MCNC: 9.6 MG/DL (ref 8.3–10.1)
CHLORIDE SERPL-SCNC: 102 MMOL/L (ref 100–108)
CO2 SERPL-SCNC: 41 MMOL/L (ref 21–32)
CREAT SERPL-MCNC: 1.21 MG/DL (ref 0.6–1.3)
EOSINOPHIL # BLD AUTO: 1.23 THOUSAND/ΜL (ref 0–0.61)
EOSINOPHIL NFR BLD AUTO: 15 % (ref 0–6)
ERYTHROCYTE [DISTWIDTH] IN BLOOD BY AUTOMATED COUNT: 12.9 % (ref 11.6–15.1)
GFR SERPL CREATININE-BSD FRML MDRD: 55 ML/MIN/1.73SQ M
GLUCOSE SERPL-MCNC: 119 MG/DL (ref 65–140)
HCT VFR BLD AUTO: 48.7 % (ref 36.5–49.3)
HGB BLD-MCNC: 14.3 G/DL (ref 12–17)
IMM GRANULOCYTES # BLD AUTO: 0.02 THOUSAND/UL (ref 0–0.2)
IMM GRANULOCYTES NFR BLD AUTO: 0 % (ref 0–2)
LYMPHOCYTES # BLD AUTO: 1.71 THOUSANDS/ΜL (ref 0.6–4.47)
LYMPHOCYTES NFR BLD AUTO: 21 % (ref 14–44)
MAGNESIUM SERPL-MCNC: 2.4 MG/DL (ref 1.6–2.6)
MCH RBC QN AUTO: 32.4 PG (ref 26.8–34.3)
MCHC RBC AUTO-ENTMCNC: 29.4 G/DL (ref 31.4–37.4)
MCV RBC AUTO: 110 FL (ref 82–98)
MONOCYTES # BLD AUTO: 1.14 THOUSAND/ΜL (ref 0.17–1.22)
MONOCYTES NFR BLD AUTO: 14 % (ref 4–12)
NEUTROPHILS # BLD AUTO: 4.13 THOUSANDS/ΜL (ref 1.85–7.62)
NEUTS SEG NFR BLD AUTO: 49 % (ref 43–75)
NRBC BLD AUTO-RTO: 0 /100 WBCS
PHOSPHATE SERPL-MCNC: 4 MG/DL (ref 2.3–4.1)
PLATELET # BLD AUTO: 143 THOUSANDS/UL (ref 149–390)
PMV BLD AUTO: 9.4 FL (ref 8.9–12.7)
POTASSIUM SERPL-SCNC: 4.4 MMOL/L (ref 3.5–5.3)
RBC # BLD AUTO: 4.41 MILLION/UL (ref 3.88–5.62)
SODIUM SERPL-SCNC: 144 MMOL/L (ref 136–145)
WBC # BLD AUTO: 8.29 THOUSAND/UL (ref 4.31–10.16)

## 2020-06-16 PROCEDURE — 80048 BASIC METABOLIC PNL TOTAL CA: CPT | Performed by: FAMILY MEDICINE

## 2020-06-16 PROCEDURE — 83735 ASSAY OF MAGNESIUM: CPT | Performed by: FAMILY MEDICINE

## 2020-06-16 PROCEDURE — 84100 ASSAY OF PHOSPHORUS: CPT | Performed by: FAMILY MEDICINE

## 2020-06-16 PROCEDURE — 85025 COMPLETE CBC W/AUTO DIFF WBC: CPT | Performed by: FAMILY MEDICINE

## 2020-06-16 PROCEDURE — 74220 X-RAY XM ESOPHAGUS 1CNTRST: CPT

## 2020-06-16 PROCEDURE — 99239 HOSP IP/OBS DSCHRG MGMT >30: CPT | Performed by: INTERNAL MEDICINE

## 2020-06-16 RX ORDER — HYDROCHLOROTHIAZIDE 12.5 MG/1
12.5 TABLET ORAL
Qty: 30 TABLET | Refills: 0 | Status: SHIPPED | OUTPATIENT
Start: 2020-06-16 | End: 2021-08-10 | Stop reason: HOSPADM

## 2020-06-16 RX ORDER — BUDESONIDE AND FORMOTEROL FUMARATE DIHYDRATE 160; 4.5 UG/1; UG/1
2 AEROSOL RESPIRATORY (INHALATION) 2 TIMES DAILY
Qty: 1 INHALER | Refills: 0 | Status: SHIPPED | OUTPATIENT
Start: 2020-06-16

## 2020-06-16 RX ORDER — AMLODIPINE BESYLATE 10 MG/1
10 TABLET ORAL DAILY
Qty: 30 TABLET | Refills: 0 | Status: ON HOLD | OUTPATIENT
Start: 2020-06-16 | End: 2021-08-10 | Stop reason: SDUPTHER

## 2020-06-16 RX ORDER — METOPROLOL TARTRATE 50 MG/1
50 TABLET, FILM COATED ORAL EVERY 12 HOURS SCHEDULED
Qty: 60 TABLET | Refills: 0 | Status: SHIPPED | OUTPATIENT
Start: 2020-06-16

## 2020-06-16 RX ORDER — ASPIRIN 81 MG/1
81 TABLET ORAL DAILY
Qty: 30 TABLET | Refills: 0 | Status: SHIPPED | OUTPATIENT
Start: 2020-06-16 | End: 2020-07-14

## 2020-06-16 RX ORDER — ATORVASTATIN CALCIUM 10 MG/1
10 TABLET, FILM COATED ORAL DAILY
Qty: 30 TABLET | Refills: 0 | Status: SHIPPED | OUTPATIENT
Start: 2020-06-16

## 2020-06-16 RX ORDER — LANOLIN ALCOHOL/MO/W.PET/CERES
6 CREAM (GRAM) TOPICAL
Qty: 90 TABLET | Refills: 0 | Status: SHIPPED | OUTPATIENT
Start: 2020-06-16

## 2020-06-16 RX ORDER — LOSARTAN POTASSIUM 100 MG/1
100 TABLET ORAL
Qty: 30 TABLET | Refills: 0 | Status: SHIPPED | OUTPATIENT
Start: 2020-06-16 | End: 2021-08-10 | Stop reason: HOSPADM

## 2020-06-16 RX ORDER — LANOLIN ALCOHOL/MO/W.PET/CERES
6 CREAM (GRAM) TOPICAL
Qty: 90 TABLET | Refills: 0 | Status: SHIPPED | OUTPATIENT
Start: 2020-06-16 | End: 2020-06-16

## 2020-06-16 RX ORDER — PANTOPRAZOLE SODIUM 40 MG/1
40 TABLET, DELAYED RELEASE ORAL DAILY
Qty: 30 TABLET | Refills: 0 | Status: SHIPPED | OUTPATIENT
Start: 2020-06-16

## 2020-06-16 RX ADMIN — LOSARTAN POTASSIUM 100 MG: 50 TABLET, FILM COATED ORAL at 17:08

## 2020-06-16 RX ADMIN — BUDESONIDE AND FORMOTEROL FUMARATE DIHYDRATE 2 PUFF: 160; 4.5 AEROSOL RESPIRATORY (INHALATION) at 17:08

## 2020-06-16 RX ADMIN — BUDESONIDE AND FORMOTEROL FUMARATE DIHYDRATE 2 PUFF: 160; 4.5 AEROSOL RESPIRATORY (INHALATION) at 08:07

## 2020-06-16 RX ADMIN — ATORVASTATIN CALCIUM 10 MG: 10 TABLET, FILM COATED ORAL at 15:41

## 2020-06-16 RX ADMIN — GUAIFENESIN 600 MG: 600 TABLET ORAL at 08:05

## 2020-06-16 RX ADMIN — PANTOPRAZOLE SODIUM 40 MG: 40 TABLET, DELAYED RELEASE ORAL at 05:47

## 2020-06-16 RX ADMIN — TIOTROPIUM BROMIDE 18 MCG: 18 CAPSULE ORAL; RESPIRATORY (INHALATION) at 08:07

## 2020-06-16 RX ADMIN — ASPIRIN 81 MG: 81 TABLET, COATED ORAL at 08:05

## 2020-06-16 RX ADMIN — ENOXAPARIN SODIUM 40 MG: 40 INJECTION SUBCUTANEOUS at 08:05

## 2020-06-16 RX ADMIN — METOPROLOL TARTRATE 50 MG: 50 TABLET, FILM COATED ORAL at 08:05

## 2020-06-16 RX ADMIN — HYDROCHLOROTHIAZIDE 25 MG: 25 TABLET ORAL at 08:05

## 2020-06-17 ENCOUNTER — TELEPHONE (OUTPATIENT)
Dept: PULMONOLOGY | Facility: CLINIC | Age: 85
End: 2020-06-17

## 2020-06-17 ENCOUNTER — TELEPHONE (OUTPATIENT)
Dept: NEUROLOGY | Facility: CLINIC | Age: 85
End: 2020-06-17

## 2020-06-17 LAB — VIT B1 BLD-SCNC: 198.3 NMOL/L (ref 66.5–200)

## 2020-06-18 ENCOUNTER — TELEPHONE (OUTPATIENT)
Dept: PULMONOLOGY | Facility: CLINIC | Age: 85
End: 2020-06-18

## 2020-06-19 ENCOUNTER — OFFICE VISIT (OUTPATIENT)
Dept: PULMONOLOGY | Facility: CLINIC | Age: 85
End: 2020-06-19
Payer: COMMERCIAL

## 2020-06-19 ENCOUNTER — TELEPHONE (OUTPATIENT)
Dept: PULMONOLOGY | Facility: CLINIC | Age: 85
End: 2020-06-19

## 2020-06-19 VITALS
HEART RATE: 64 BPM | DIASTOLIC BLOOD PRESSURE: 62 MMHG | TEMPERATURE: 98.9 F | BODY MASS INDEX: 27.38 KG/M2 | HEIGHT: 61 IN | OXYGEN SATURATION: 92 % | SYSTOLIC BLOOD PRESSURE: 130 MMHG | WEIGHT: 145 LBS

## 2020-06-19 DIAGNOSIS — J44.9 CHRONIC OBSTRUCTIVE PULMONARY DISEASE, UNSPECIFIED COPD TYPE (HCC): Primary | ICD-10-CM

## 2020-06-19 DIAGNOSIS — R13.19 OTHER DYSPHAGIA: ICD-10-CM

## 2020-06-19 DIAGNOSIS — J96.11 CHRONIC RESPIRATORY FAILURE WITH HYPOXIA (HCC): ICD-10-CM

## 2020-06-19 DIAGNOSIS — I50.32 CHRONIC DIASTOLIC CHF (CONGESTIVE HEART FAILURE) (HCC): ICD-10-CM

## 2020-06-19 DIAGNOSIS — R06.83 SNORING: ICD-10-CM

## 2020-06-19 PROBLEM — G93.41 ACUTE METABOLIC ENCEPHALOPATHY: Status: RESOLVED | Noted: 2020-06-13 | Resolved: 2020-06-19

## 2020-06-19 LAB
METHYLMALONATE SERPL-SCNC: 407 NMOL/L (ref 0–378)
SL AMB DISCLAIMER: ABNORMAL

## 2020-06-19 PROCEDURE — 4040F PNEUMOC VAC/ADMIN/RCVD: CPT | Performed by: INTERNAL MEDICINE

## 2020-06-19 PROCEDURE — 3078F DIAST BP <80 MM HG: CPT | Performed by: INTERNAL MEDICINE

## 2020-06-19 PROCEDURE — 1111F DSCHRG MED/CURRENT MED MERGE: CPT | Performed by: INTERNAL MEDICINE

## 2020-06-19 PROCEDURE — 1160F RVW MEDS BY RX/DR IN RCRD: CPT | Performed by: INTERNAL MEDICINE

## 2020-06-19 PROCEDURE — 94618 PULMONARY STRESS TESTING: CPT | Performed by: INTERNAL MEDICINE

## 2020-06-19 PROCEDURE — 99215 OFFICE O/P EST HI 40 MIN: CPT | Performed by: INTERNAL MEDICINE

## 2020-06-19 PROCEDURE — 3008F BODY MASS INDEX DOCD: CPT | Performed by: INTERNAL MEDICINE

## 2020-06-19 PROCEDURE — 1036F TOBACCO NON-USER: CPT | Performed by: INTERNAL MEDICINE

## 2020-06-19 PROCEDURE — 3075F SYST BP GE 130 - 139MM HG: CPT | Performed by: INTERNAL MEDICINE

## 2020-07-14 DIAGNOSIS — I71.2 THORACIC AORTIC ANEURYSM WITHOUT RUPTURE (HCC): ICD-10-CM

## 2020-07-14 RX ORDER — ASPIRIN 81 MG/1
TABLET, COATED ORAL
Qty: 30 TABLET | Refills: 0 | Status: SHIPPED | OUTPATIENT
Start: 2020-07-14 | End: 2021-10-14 | Stop reason: SDUPTHER

## 2020-07-16 ENCOUNTER — CLINICAL SUPPORT (OUTPATIENT)
Dept: PULMONOLOGY | Age: 85
End: 2020-07-16

## 2020-07-16 DIAGNOSIS — J44.9 CHRONIC OBSTRUCTIVE PULMONARY DISEASE, UNSPECIFIED COPD TYPE (HCC): ICD-10-CM

## 2020-07-16 DIAGNOSIS — J96.11 CHRONIC RESPIRATORY FAILURE WITH HYPOXIA (HCC): ICD-10-CM

## 2020-07-16 NOTE — PROGRESS NOTES
CARDIAC/PULMONARY REHAB ASSESSMENT    Today's date: 2020   Patient name: Colleen Shah      :        MRN: [id number]  PCP: Lashell Lea MD  Pulmonologist: Noelle Fernandez  Dx:   Encounter Diagnoses   Name Primary?  Chronic obstructive pulmonary disease, unspecified COPD type ([facility] )     Chronic respiratory failure with hypoxia ([facility] )      Date of onset: 2020 rehab start  Cultural needs: patient does not speak [place], [place] speaking, wife interprets but her English is not strong    Height:   65  Weight:    145  Medical History:   Past Medical History:   Diagnosis Date    Acute metabolic encephalopathy     Appendicolith     Ascending aortic aneurysm ([facility])     3 7    Asthma     BPH (benign prostatic hyperplasia)     CAD (coronary artery disease)     noted on CT scan    COPD (chronic obstructive pulmonary disease) ([facility])     Descending thoracic aortic aneurysm ([facility] )     Diverticulosis     Former tobacco use     GERD (gastroesophageal reflux disease)     History of DVT (deep vein thrombosis)     Left leg    History of transfusion     Hypertension     Inguinal hernia     right    Nephrolithiasis     Oxygen dependent     2LNC    Prostate calculus     PVD (peripheral vascular disease) ([facility])     Ulcer     Varicose vein of leg     b/l       Physical Limitations: None    Risk Factors   Smoking: quit 20 years ago, smoked 35 yrs x 1 ppd  HTN: yes  DM: n/a  Obesity: n/a   Inactivity: yes  Family History:   Family History   Problem Relation Age of Onset    Tuberculosis Mother     No Known Problems Father     Cancer Sister     Diabetes Family     Hypertension Family      Allergies:    Allergies   Allergen Reactions    Penicillins Hives, Itching and Rash    Lisinopril Rash     Side pains and rash      Other: Uses oxygen at 2L NC    Current Medications:   Current Outpatient Medications   Medication Sig Dispense Refill    acetaminophen (TYLENOL) 325 mg tablet Take 2 tablets (650 mg total) by mouth every 6 (six) hours as needed for fever (temperature greater than 101 F) 30 tablet 0    albuterol (2 5 mg/3 mL) 0 083 % nebulizer solution Take 1 vial (2 5 mg total) by nebulization every 6 (six) hours as needed for wheezing or shortness of breath 75 mL 1    albuterol (VENTOLIN HFA) 90 mcg/act inhaler Inhale 2 puffs      amLODIPine (NORVASC) 10 mg tablet Take 1 tablet (10 mg total) by mouth daily 30 tablet 0    ASPIRIN 81 81 MG EC tablet TAKE ONE TABLET BY MOUTH ONCE DAILY 30 tablet 0    atorvastatin (LIPITOR) 10 mg tablet Take 1 tablet (10 mg total) by mouth daily 30 tablet 0    budesonide-formoterol (SYMBICORT) 160-4 5 mcg/act inhaler Inhale 2 puffs 2 (two) times a day Rinse mouth after use  1 Inhaler 0    hydrochlorothiazide (HYDRODIURIL) 12 5 mg tablet Take 1 tablet (12 5 mg total) by mouth daily after breakfast 30 tablet 0    losartan (COZAAR) 100 MG tablet Take 1 tablet (100 mg total) by mouth daily after dinner 30 tablet 0    melatonin 3 mg Take 2 tablets (6 mg total) by mouth daily at bedtime (Patient not taking: Reported on 6/19/2020) 90 tablet 0    metoprolol tartrate (LOPRESSOR) 50 mg tablet Take 1 tablet (50 mg total) by mouth every 12 (twelve) hours 60 tablet 0    pantoprazole (PROTONIX) 40 mg tablet Take 1 tablet (40 mg total) by mouth daily 30 tablet 0    tiotropium (SPIRIVA) 18 mcg inhalation capsule Place 1 capsule (18 mcg total) into inhaler and inhale daily 30 capsule 0     No current facility-administered medications for this visit          Functional Status Prior to Diagnosis for Treatment   Occupation: retired  Recreation: n/a  ADLs: mostly independent but wife helps when he becomes SOB  [place]: lives with wife, helps him when needed  Exercise: none  Other: hx heart failure    Short Term Program Goals: Decrease shortness of breath, improve stamina, increase strength   Long Term Goals: enjoy family, friends without breathing difficulties    Comments:pt not very conversational due to language barrier    Ability to reach goals/rehabilitation potential: medium    Projected return to function: Partial    Emotional/Social    Marital status:     Life Stressors: health    Goals: improve breathing problems, more energy/endurance

## 2020-07-23 DIAGNOSIS — J44.9 CHRONIC OBSTRUCTIVE PULMONARY DISEASE, UNSPECIFIED COPD TYPE (HCC): Primary | ICD-10-CM

## 2020-09-18 DIAGNOSIS — I71.4 ABDOMINAL AORTIC ANEURYSM WITHOUT RUPTURE (HCC): Primary | ICD-10-CM

## 2020-09-24 ENCOUNTER — OFFICE VISIT (OUTPATIENT)
Dept: CARDIAC SURGERY | Facility: CLINIC | Age: 85
End: 2020-09-24
Payer: COMMERCIAL

## 2020-09-24 VITALS
HEART RATE: 59 BPM | RESPIRATION RATE: 20 BRPM | HEIGHT: 61 IN | SYSTOLIC BLOOD PRESSURE: 150 MMHG | BODY MASS INDEX: 29.27 KG/M2 | TEMPERATURE: 97.7 F | DIASTOLIC BLOOD PRESSURE: 80 MMHG | WEIGHT: 155 LBS

## 2020-09-24 DIAGNOSIS — Z86.79 STATUS POST ENDOSCOPIC REPAIR OF THORACIC AORTIC ANEURYSM (TAA): Primary | ICD-10-CM

## 2020-09-24 DIAGNOSIS — Z98.890 STATUS POST ENDOSCOPIC REPAIR OF THORACIC AORTIC ANEURYSM (TAA): Primary | ICD-10-CM

## 2020-09-24 PROCEDURE — 99214 OFFICE O/P EST MOD 30 MIN: CPT | Performed by: NURSE PRACTITIONER

## 2020-09-24 NOTE — LETTER
September 24, 2020     Von Daniels MD  [facility]    Patient: Juan C Sanches   YOB: 1935   Date of Visit: 9/24/2020       Dear Dr Mark Miller: Thank you for referring Juan C Sanches to me for evaluation  Below are my notes for this consultation  If you have questions, please do not hesitate to call me  I look forward to following your patient along with you  Sincerely,        Fatuma Waters MD        CC: No Recipients  RAQUEL Good  9/24/2020 10:45 AM  Attested  [facility]  Juan C Sanches 82 y o  male MRN: [id number]      Reason for Consult / Principal Problem: F/U s/p TEVAR 12/27/18    History of Present Illness: Juan C Sanches is a 82y o  year old male who presents today for ongoing surveillance s/p TEVAR procedure December 2018  Patient is accompanied by his wife, both of which are [place] speaking only  Communication via Dr Baljinder Viera  Patient's wife states he was "very sick" this summer and admitted to the [place] [address]  She could not provide details as to his diagnosis  He was Covid negative  According to his Epic chart he was admitted with TIA symptoms and HTN  Today patient offers no complaints  He denies chest pain, syncope, limb pain, weakness, numbness or tingling, back or abdominal pain  He uses chronic oxygen therapy       Past Medical History:  Past Medical History:   Diagnosis Date    Acute metabolic encephalopathy 2/56/2211    Appendicolith     Ascending aortic aneurysm (HCC)     3 7    Asthma     BPH (benign prostatic hyperplasia)     CAD (coronary artery disease)     noted on CT scan    COPD (chronic obstructive pulmonary disease) (HCC)     Descending thoracic aortic aneurysm (HCC)     Diverticulosis     Former tobacco use     GERD (gastroesophageal reflux disease)     History of DVT (deep vein thrombosis)     Left leg    History of transfusion     Hypertension     Inguinal hernia     right    Nephrolithiasis     Oxygen dependent     2LNC    Prostate calculus     PVD (peripheral vascular disease) (HCC)     Ulcer     Varicose vein of leg     b/l         Past Surgical History:   Past Surgical History:   Procedure Laterality Date    CARDIAC SURGERY      ESOPHAGOGASTRODUODENOSCOPY      HERNIA REPAIR Right 2019    Procedure: REPAIR HERNIA INGUINAL WITH MESH;  Surgeon: Lavonne Pak MD;  Location: [address] [place] [address] MAIN OR;  Service: General    INGUINAL HERNIA REPAIR Bilateral     IR TEVAR  2018    MT ENDOVASC TAA REINCL SUBCL N/A 2018    Procedure: TEVAR - endovascular thoracic aortic aneurysm repair;  Surgeon: Raiza Vasquez MD;  Location:  MAIN OR;  Service: Vascular    THORACIC AORTIC ANEURYSM REPAIR  2018    VARICOSE VEIN SURGERY Bilateral     vein stripping         Family History:  Family History   Problem Relation Age of Onset    Tuberculosis Mother     No Known Problems Father     Cancer Sister     Diabetes Family     Hypertension Family          Social History:    Social History     Substance and Sexual Activity   Alcohol Use Never    Frequency: Patient refused     Social History     Substance and Sexual Activity   Drug Use No     Social History     Tobacco Use   Smoking Status Former Smoker    Packs/day: 1 00    Years: 35 00    Pack years: 35 00    Start date:     Last attempt to quit:     Years since quittin 7   Smokeless Tobacco Never Used         Home Medications:   Prior to Admission medications    Medication Sig Start Date End Date Taking?  Authorizing Provider   acetaminophen (TYLENOL) 325 mg tablet Take 2 tablets (650 mg total) by mouth every 6 (six) hours as needed for fever (temperature greater than 101 F) 18  Yes Lucas Munoz PA-C   albuterol (2 5 mg/3 mL) 0 083 % nebulizer solution Take 1 vial (2 5 mg total) by nebulization every 6 (six) hours as needed for wheezing or shortness of breath 18  Yes Nehemiah Lomas MD   albuterol (VENTOLIN HFA) 90 mcg/act inhaler Inhale 2 puffs 8/8/17  Yes Historical Provider, MD   amLODIPine (NORVASC) 10 mg tablet Take 1 tablet (10 mg total) by mouth daily 6/16/20  Yes Colt Bright DO   ASPIRIN 81 81 MG EC tablet TAKE ONE TABLET BY MOUTH ONCE DAILY 7/14/20  Yes Colt Bright DO   atorvastatin (LIPITOR) 10 mg tablet Take 1 tablet (10 mg total) by mouth daily 6/16/20  Yes Colt Bright DO   budesonide-formoterol (SYMBICORT) 160-4 5 mcg/act inhaler Inhale 2 puffs 2 (two) times a day Rinse mouth after use  6/16/20  Yes Colt Bright DO   losartan (COZAAR) 100 MG tablet Take 1 tablet (100 mg total) by mouth daily after dinner 6/16/20  Yes Colt Bright DO   metoprolol tartrate (LOPRESSOR) 50 mg tablet Take 1 tablet (50 mg total) by mouth every 12 (twelve) hours 6/16/20  Yes Colt Bright DO   pantoprazole (PROTONIX) 40 mg tablet Take 1 tablet (40 mg total) by mouth daily 6/16/20  Yes Colt Bright DO   tiotropium (SPIRIVA) 18 mcg inhalation capsule Place 1 capsule (18 mcg total) into inhaler and inhale daily 6/16/20  Yes Colt Bright DO   hydrochlorothiazide (HYDRODIURIL) 12 5 mg tablet Take 1 tablet (12 5 mg total) by mouth daily after breakfast  Patient not taking: Reported on 9/24/2020 6/16/20   Tony Zamorano DO   melatonin 3 mg Take 2 tablets (6 mg total) by mouth daily at bedtime  Patient not taking: Reported on 6/19/2020 6/16/20   Tony Zamorano DO       Allergies:   Allergies   Allergen Reactions    Penicillins Hives, Itching and Rash    Lisinopril Rash     Side pains and rash        Review of Systems:   Review of Systems - History obtained from chart review and the patient  General ROS: negative  Psychological ROS: negative  Ophthalmic ROS: negative  ENT ROS: negative  Allergy and Immunology ROS: negative  Hematological and Lymphatic ROS: negative  Endocrine ROS: negative  Respiratory ROS: no cough, shortness of breath, or wheezing  Cardiovascular ROS: no chest pain or dyspnea on exertion  Gastrointestinal ROS: no abdominal pain, change in bowel habits, or black or bloody stools  Genito-Urinary ROS: no dysuria, trouble voiding, or hematuria  Musculoskeletal ROS: negative  Neurological ROS: no TIA or stroke symptoms  Dermatological ROS: negative    Vital Signs:   Vitals:    09/24/20 1006   BP: 150/80   Pulse: 59   Resp: 20   Temp: 97 7 °F (36 5 °C)   Weight: 70 3 kg (155 lb)   Height: 5' 1" (1 549 m)       Physical Exam:  General: Alert, oriented, well developed, no acute idstress  HEENT/NECK:  PERRLA  No jugular venous distention  Cardiac:Regular rate and rhythm, No murmurs rubs or gallops  Carotid arteries: 2+ pulses, no bruits  Pulmonary:  Breath sounds distant; late expiratory wheezes  Abdomen:  Non-tender, Non-distended  Positive bowel sounds  Upper extremities: 2+ radial pulses; brisk capillary refill; hands warm  Lower extremities: Extremities warm/dry  PT/DP pulses 1+ bilaterally  Trace edema B/L  Neuro: Alert and oriented X 3  Sensation is grossly intact  No focal deficits  Musculoskeletal: MAEE, stable gait  Skin: Warm/Dry, without rashes or lesions  Lab Results:   Lab Results   Component Value Date     03/01/2014    SODIUM 144 06/16/2020    K 4 4 06/16/2020     06/16/2020    CO2 41 (H) 06/16/2020    ANIONGAP 0 (L) 03/01/2014    AGAP 1 (L) 06/16/2020    BUN 39 (H) 06/16/2020    CREATININE 1 21 06/16/2020    GLUC 119 06/16/2020    GLUF 109 (H) 06/12/2020    CALCIUM 9 6 06/16/2020    AST 20 06/11/2020    ALT 22 06/11/2020    ALKPHOS 79 06/11/2020    TP 8 2 06/11/2020    TBILI 0 88 06/11/2020    EGFR 55 06/16/2020     Lab Results   Component Value Date    HGBA1C 6 2 (H) 06/13/2020     Lab Results   Component Value Date    CKTOTAL 52 06/11/2020    TROPONINI <0 02 06/11/2020       Imaging Studies:     CT Chest: 5/28/20  Stable postoperative changes of prior endovascular repair of a fusiform descending thoracic aortic aneurysm    The aneurysm sac measures up to 4 7 cm in diameter, not significantly changed from the prior exam     I have personally reviewed pertinent films in PACS    Assessment:  Patient Active Problem List    Diagnosis Date Noted    Altered mental status     Early satiety 06/12/2020    Dysphagia 06/12/2020    TIA (transient ischemic attack) 05/28/2020    Left leg swelling 03/22/2019    Acquired renal cyst of left kidney 01/21/2019    Preop exam for internal medicine 01/21/2019    Abdominal pain 01/19/2019    Incarcerated right inguinal hernia 01/19/2019    Chronic diastolic CHF (congestive heart failure) [facility] 01/19/2019    Leukocytosis 12/29/2018    Thrombocytopenia ([address] ) 12/29/2018    Hypochloremia 12/29/2018    Aneurysm, aorta, thoracic ([facility]     Thoracic aortic aneurysm without rupture ([facility] ) 11/19/2018    Edema of both legs 08/02/2018    Snoring 08/02/2018    Varicose veins of both lower extremities with pain 05/23/2018    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/23/2018    Chronic respiratory failure with hypoxia (HCC) 04/02/2018    Accelerated essential hypertension 02/01/2018    COPD (chronic obstructive pulmonary disease) ([facility] ) 02/01/2018    Descending aortic aneurysm ([facility] ) 02/01/2018    History of DVT (deep vein thrombosis) 02/01/2018    Benign essential hypertension 08/02/2017    Thoracic aortic aneurysm ([facility] ) 08/02/2017       Plan:    CT imaging performed prior to this visit demonstrates stable appearance the thoracic aorta and endograft  These findings were confirmed and shared with the patient today  Follow up in 2 years with non-contrast CT a/a/p    Blair Barahona was comfortable with our recommendations, and his questions were answered to his satisfaction  Aortic Aneurysm Instructions were provided to the patient as follows:    1  No lifting more than 50 pounds  Regular aerobic exercise permitted and recommended  2  Maintain a controlled blood pressure with a goal of less than 140/80    3  Follow up in [facility] Clinic as recommended with radiology follow up as instructed  4  Report to the ER or call 911 immediately with the following signs / symptoms: sudden onset of back pain, chest pain or shortness of breath  SIGNATURE: Blondell Gosselin, CRNP  DATE: September 24, 2020  TIME: 10:27 AM  Attestation signed by Nicol Melvin MD at 9/24/2020 11:30 AM:  Patient seen and evaluated with 10 Ryne Love / JEREMIE  I agree with the above assessment and plan with the following additions  The patient is an 57-year-old man 2 years status post TEVAR for descending thoracic aorta aneurysm  He comes today for routine follow-up  CT of the chest shows no evidence of endoleak, aneurysm sac has resolved    Plan:  Follow-up in 2 years with a CT of the chest

## 2020-09-24 NOTE — PROGRESS NOTES
Aortic Clinic  [facility] 82 y o  male MRN: [id number]      Reason for Consult / Principal Problem: F/U s/p TEVAR 12/27/18    History of Present Illness: [facility] is a 82y o  year old male who presents today for ongoing surveillance s/p TEVAR procedure December 2018  Patient is accompanied by his wife, both of which are Maltese speaking only  Communication via Dr Yamilex Donaldson  Patient's wife states he was "very sick" this summer and admitted to the [place] [address]  She could not provide details as to his diagnosis  He was Covid negative  According to his Epic chart he was admitted with TIA symptoms and HTN  Today patient offers no complaints  He denies chest pain, syncope, limb pain, weakness, numbness or tingling, back or abdominal pain  He uses chronic oxygen therapy       Past Medical History:  Past Medical History:   Diagnosis Date    Acute metabolic encephalopathy 5/69/4541    Appendicolith     Ascending aortic aneurysm (HCC)     3 7    Asthma     BPH (benign prostatic hyperplasia)     CAD (coronary artery disease)     noted on CT scan    COPD (chronic obstructive pulmonary disease) (HCC)     Descending thoracic aortic aneurysm (HCC)     Diverticulosis     Former tobacco use     GERD (gastroesophageal reflux disease)     History of DVT (deep vein thrombosis)     Left leg    History of transfusion     Hypertension     Inguinal hernia     right    Nephrolithiasis     Oxygen dependent     2LNC    Prostate calculus     PVD (peripheral vascular disease) (HCC)     Ulcer     Varicose vein of leg     b/l         Past Surgical History:   Past Surgical History:   Procedure Laterality Date    CARDIAC SURGERY      ESOPHAGOGASTRODUODENOSCOPY      HERNIA REPAIR Right 1/21/2019    Procedure: REPAIR HERNIA INGUINAL WITH MESH;  Surgeon: Lavonne Pak MD;  Location: [facility];  Service: General    INGUINAL HERNIA REPAIR Bilateral     IR TEVAR  12/27/2018    WI ENDOVASC TAA REINCL SUBCL N/A 2018    Procedure: TEVAR - endovascular thoracic aortic aneurysm repair;  Surgeon: Kurtis Ruth MD;  [facility];  Service: Vascular    THORACIC AORTIC ANEURYSM REPAIR  2018    VARICOSE VEIN SURGERY Bilateral     vein stripping         Family History:  Family History   Problem Relation Age of Onset    Tuberculosis Mother     No Known Problems Father     Cancer Sister     Diabetes Family     Hypertension Family          Social History:    Social History     Substance and Sexual Activity   Alcohol Use Never    Frequency: Patient refused     Social History     Substance and Sexual Activity   Drug Use No     Social History     Tobacco Use   Smoking Status Former Smoker    Packs/day: 1 00    Years: 35 00    Pack years: 35 00    Start date:     Last attempt to quit:     Years since quittin 7   Smokeless Tobacco Never Used         Home Medications:   Prior to Admission medications    Medication Sig Start Date End Date Taking? Authorizing Provider   acetaminophen (TYLENOL) 325 mg tablet Take 2 tablets (650 mg total) by mouth every 6 (six) hours as needed for fever (temperature greater than 101 F) 18  Yes Lucas Munoz PA-C   albuterol (2 5 mg/3 mL) 0 083 % nebulizer solution Take 1 vial (2 5 mg total) by nebulization every 6 (six) hours as needed for wheezing or shortness of breath 18  Yes Hellen Curran MD   albuterol (VENTOLIN HFA) 90 mcg/act inhaler Inhale 2 puffs 17  Yes Historical Provider, MD   amLODIPine (NORVASC) 10 mg tablet Take 1 tablet (10 mg total) by mouth daily 20  Yes Colt Bright DO   ASPIRIN 81 81 MG EC tablet TAKE ONE TABLET BY MOUTH ONCE DAILY 20  Yes Colt Bright DO   atorvastatin (LIPITOR) 10 mg tablet Take 1 tablet (10 mg total) by mouth daily 20  Yes Colt Bright DO   budesonide-formoterol (SYMBICORT) 160-4 5 mcg/act inhaler Inhale 2 puffs 2 (two) times a day Rinse mouth after use   20  Yes Colt DO Deangelo   losartan (COZAAR) 100 MG tablet Take 1 tablet (100 mg total) by mouth daily after dinner 6/16/20  Yes Colt Bright DO   metoprolol tartrate (LOPRESSOR) 50 mg tablet Take 1 tablet (50 mg total) by mouth every 12 (twelve) hours 6/16/20  Yes Colt Bright DO   pantoprazole (PROTONIX) 40 mg tablet Take 1 tablet (40 mg total) by mouth daily 6/16/20  Yes Colt Bright DO   tiotropium (SPIRIVA) 18 mcg inhalation capsule Place 1 capsule (18 mcg total) into inhaler and inhale daily 6/16/20  Yes Colt Bright DO   hydrochlorothiazide (HYDRODIURIL) 12 5 mg tablet Take 1 tablet (12 5 mg total) by mouth daily after breakfast  Patient not taking: Reported on 9/24/2020 6/16/20   Binta Mccoy DO   melatonin 3 mg Take 2 tablets (6 mg total) by mouth daily at bedtime  Patient not taking: Reported on 6/19/2020 6/16/20   Binta Mccoy DO       Allergies: Allergies   Allergen Reactions    Penicillins Hives, Itching and Rash    Lisinopril Rash     Side pains and rash        Review of Systems:   Review of Systems - History obtained from chart review and the patient  General ROS: negative  Psychological ROS: negative  Ophthalmic ROS: negative  ENT ROS: negative  Allergy and Immunology ROS: negative  Hematological and Lymphatic ROS: negative  Endocrine ROS: negative  Respiratory ROS: no cough, shortness of breath, or wheezing  Cardiovascular ROS: no chest pain or dyspnea on exertion  Gastrointestinal ROS: no abdominal pain, change in bowel habits, or black or bloody stools  Genito-Urinary ROS: no dysuria, trouble voiding, or hematuria  Musculoskeletal ROS: negative  Neurological ROS: no TIA or stroke symptoms  Dermatological ROS: negative    Vital Signs:   Vitals:    09/24/20 1006   BP: 150/80   Pulse: 59   Resp: 20   Temp: 97 7 °F (36 5 °C)   Weight: 70 3 kg (155 lb)   Height: 5' 1" (1 549 m)       Physical Exam:  General: Alert, oriented, well developed, no acute idstress  HEENT/NECK:  PERRLA    No jugular venous distention  Cardiac:Regular rate and rhythm, No murmurs rubs or gallops  Carotid arteries: 2+ pulses, no bruits  Pulmonary:  Breath sounds distant; late expiratory wheezes  Abdomen:  Non-tender, Non-distended  Positive bowel sounds  Upper extremities: 2+ radial pulses; brisk capillary refill; hands warm  Lower extremities: Extremities warm/dry  PT/DP pulses 1+ bilaterally  Trace edema B/L  Neuro: Alert and oriented X 3  Sensation is grossly intact  No focal deficits  Musculoskeletal: MAEE, stable gait  Skin: Warm/Dry, without rashes or lesions  Lab Results:   Lab Results   Component Value Date     03/01/2014    SODIUM 144 06/16/2020    K 4 4 06/16/2020     06/16/2020    CO2 41 (H) 06/16/2020    ANIONGAP 0 (L) 03/01/2014    AGAP 1 (L) 06/16/2020    BUN 39 (H) 06/16/2020    CREATININE 1 21 06/16/2020    GLUC 119 06/16/2020    GLUF 109 (H) 06/12/2020    CALCIUM 9 6 06/16/2020    AST 20 06/11/2020    ALT 22 06/11/2020    ALKPHOS 79 06/11/2020    TP 8 2 06/11/2020    TBILI 0 88 06/11/2020    EGFR 55 06/16/2020     Lab Results   Component Value Date    HGBA1C 6 2 (H) 06/13/2020     Lab Results   Component Value Date    CKTOTAL 52 06/11/2020    TROPONINI <0 02 06/11/2020       Imaging Studies:     CT Chest: 5/28/20  Stable postoperative changes of prior endovascular repair of a fusiform descending thoracic aortic aneurysm    The aneurysm sac measures up to 4 7 cm in diameter, not significantly changed from the prior exam     I have personally reviewed pertinent films in PACS    Assessment:  Patient Active Problem List    Diagnosis Date Noted    Altered mental status     Early satiety 06/12/2020    Dysphagia 06/12/2020    TIA (transient ischemic attack) 05/28/2020    Left leg swelling 03/22/2019    Acquired renal cyst of left kidney 01/21/2019    Preop exam for internal medicine 01/21/2019    Abdominal pain 01/19/2019    Incarcerated right inguinal hernia 01/19/2019    Chronic diastolic CHF (congestive heart failure) ([place] [address] ) 01/19/2019    Leukocytosis 12/29/2018    Thrombocytopenia ([place] [address] ) 12/29/2018    Hypochloremia 12/29/2018    Aneurysm, aorta, thoracic ([place] [address] )     Thoracic aortic aneurysm without rupture ([place] [address] ) 11/19/2018    Edema of both legs 08/02/2018    Snoring 08/02/2018    Varicose veins of both lower extremities with pain 05/23/2018    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/23/2018    Chronic respiratory failure with hypoxia (HCC) 04/02/2018    Accelerated essential hypertension 02/01/2018    COPD (chronic obstructive pulmonary disease) ([place] [address] ) 02/01/2018    Descending aortic aneurysm ([place] [address] ) 02/01/2018    History of DVT (deep vein thrombosis) 02/01/2018    Benign essential hypertension 08/02/2017    Thoracic aortic aneurysm ([place] [address] ) 08/02/2017       Plan:    CT imaging performed prior to this visit demonstrates stable appearance the thoracic aorta and endograft  These findings were confirmed and shared with the patient today  Follow up in 2 years with non-contrast CT a/a/p    Diana Quispe was comfortable with our recommendations, and his questions were answered to his satisfaction  Aortic Aneurysm Instructions were provided to the patient as follows:    1  No lifting more than 50 pounds  Regular aerobic exercise permitted and recommended  2  Maintain a controlled blood pressure with a goal of less than 140/80  3  Follow up in Aortic Clinic as recommended with radiology follow up as instructed  4  Report to the ER or call 911 immediately with the following signs / symptoms: sudden onset of back pain, chest pain or shortness of breath        SIGNATURE: RAQUEL Chiang  DATE: September 24, 2020  TIME: 10:27 AM

## 2021-06-09 ENCOUNTER — OFFICE VISIT (OUTPATIENT)
Dept: URGENT CARE | Age: 86
End: 2021-06-09
Payer: COMMERCIAL

## 2021-06-09 VITALS
DIASTOLIC BLOOD PRESSURE: 80 MMHG | RESPIRATION RATE: 20 BRPM | TEMPERATURE: 98 F | OXYGEN SATURATION: 100 % | SYSTOLIC BLOOD PRESSURE: 165 MMHG | BODY MASS INDEX: 29.27 KG/M2 | HEART RATE: 80 BPM | WEIGHT: 155 LBS | HEIGHT: 61 IN

## 2021-06-09 DIAGNOSIS — R30.0 BURNING WITH URINATION: ICD-10-CM

## 2021-06-09 DIAGNOSIS — N30.00 ACUTE CYSTITIS WITHOUT HEMATURIA: Primary | ICD-10-CM

## 2021-06-09 LAB
SL AMB  POCT GLUCOSE, UA: ABNORMAL
SL AMB LEUKOCYTE ESTERASE,UA: ABNORMAL
SL AMB POCT BILIRUBIN,UA: ABNORMAL
SL AMB POCT BLOOD,UA: ABNORMAL
SL AMB POCT CLARITY,UA: ABNORMAL
SL AMB POCT COLOR,UA: YELLOW
SL AMB POCT KETONES,UA: ABNORMAL
SL AMB POCT NITRITE,UA: ABNORMAL
SL AMB POCT PH,UA: 7
SL AMB POCT SPECIFIC GRAVITY,UA: 1.01
SL AMB POCT URINE PROTEIN: ABNORMAL
SL AMB POCT UROBILINOGEN: 1

## 2021-06-09 PROCEDURE — 87086 URINE CULTURE/COLONY COUNT: CPT | Performed by: PHYSICIAN ASSISTANT

## 2021-06-09 PROCEDURE — 81002 URINALYSIS NONAUTO W/O SCOPE: CPT | Performed by: PHYSICIAN ASSISTANT

## 2021-06-09 PROCEDURE — 99213 OFFICE O/P EST LOW 20 MIN: CPT | Performed by: PHYSICIAN ASSISTANT

## 2021-06-09 RX ORDER — CIPROFLOXACIN 500 MG/1
500 TABLET, FILM COATED ORAL EVERY 12 HOURS SCHEDULED
Qty: 10 TABLET | Refills: 0 | Status: SHIPPED | OUTPATIENT
Start: 2021-06-09 | End: 2021-06-09 | Stop reason: SDUPTHER

## 2021-06-09 RX ORDER — CIPROFLOXACIN 500 MG/1
500 TABLET, FILM COATED ORAL EVERY 12 HOURS SCHEDULED
Qty: 10 TABLET | Refills: 0 | Status: SHIPPED | OUTPATIENT
Start: 2021-06-09 | End: 2021-06-14

## 2021-06-09 NOTE — PROGRESS NOTES
[facility]        NAME: Diogenes Cruz is a 82 y o  male  : 1935    MRN: [id number]  DATE: 2021  TIME: 5:52 PM    Assessment and Plan   Acute cystitis without hematuria [N30 00]  1  Acute cystitis without hematuria  ciprofloxacin (CIPRO) 500 mg tablet    DISCONTINUED: ciprofloxacin (CIPRO) 500 mg tablet   2  Burning with urination  POCT urine dip    Urine culture         Patient Instructions     Follow up with PCP in 3-5 days  Proceed to  ER if symptoms worsen  Chief Complaint     Chief Complaint   Patient presents with    Possible UTI     symptoms started about a "long time as stated" pt states it hurts when "i go pee"  History of Present Illness         73-year-old male presents for urinary burning and frequency  Patient states his symptoms started approximately 2 weeks ago but have worsened last day or 2  He denies any fever, chills, sweats, abdominal pain or back pain  Review of Systems   Review of Systems   Constitutional: Negative  Gastrointestinal: Negative  Genitourinary: Positive for dysuria, frequency and urgency  Negative for difficulty urinating, discharge, flank pain, hematuria, penile pain, penile swelling, scrotal swelling and testicular pain           Current Medications       Current Outpatient Medications:     acetaminophen (TYLENOL) 325 mg tablet, Take 2 tablets (650 mg total) by mouth every 6 (six) hours as needed for fever (temperature greater than 101 F), Disp: 30 tablet, Rfl: 0    albuterol (2 5 mg/3 mL) 0 083 % nebulizer solution, Take 1 vial (2 5 mg total) by nebulization every 6 (six) hours as needed for wheezing or shortness of breath, Disp: 75 mL, Rfl: 1    albuterol (VENTOLIN HFA) 90 mcg/act inhaler, Inhale 2 puffs, Disp: , Rfl:     amLODIPine (NORVASC) 10 mg tablet, Take 1 tablet (10 mg total) by mouth daily, Disp: 30 tablet, Rfl: 0    ASPIRIN 81 81 MG EC tablet, TAKE ONE TABLET BY MOUTH ONCE DAILY, Disp: 30 tablet, Rfl: 0   atorvastatin (LIPITOR) 10 mg tablet, Take 1 tablet (10 mg total) by mouth daily, Disp: 30 tablet, Rfl: 0    budesonide-formoterol (SYMBICORT) 160-4 5 mcg/act inhaler, Inhale 2 puffs 2 (two) times a day Rinse mouth after use , Disp: 1 Inhaler, Rfl: 0    ciprofloxacin (CIPRO) 500 mg tablet, Take 1 tablet (500 mg total) by mouth every 12 (twelve) hours for 5 days, Disp: 10 tablet, Rfl: 0    hydrochlorothiazide (HYDRODIURIL) 12 5 mg tablet, Take 1 tablet (12 5 mg total) by mouth daily after breakfast (Patient not taking: Reported on 9/24/2020), Disp: 30 tablet, Rfl: 0    losartan (COZAAR) 100 MG tablet, Take 1 tablet (100 mg total) by mouth daily after dinner, Disp: 30 tablet, Rfl: 0    melatonin 3 mg, Take 2 tablets (6 mg total) by mouth daily at bedtime (Patient not taking: Reported on 6/19/2020), Disp: 90 tablet, Rfl: 0    metoprolol tartrate (LOPRESSOR) 50 mg tablet, Take 1 tablet (50 mg total) by mouth every 12 (twelve) hours, Disp: 60 tablet, Rfl: 0    pantoprazole (PROTONIX) 40 mg tablet, Take 1 tablet (40 mg total) by mouth daily, Disp: 30 tablet, Rfl: 0    tiotropium (SPIRIVA) 18 mcg inhalation capsule, Place 1 capsule (18 mcg total) into inhaler and inhale daily, Disp: 30 capsule, Rfl: 0    Current Allergies     Allergies as of 06/09/2021 - Reviewed 06/09/2021   Allergen Reaction Noted    Penicillins Hives, Itching, and Rash 09/05/2017    Lisinopril Rash 11/02/2017            The following portions of the patient's history were reviewed and updated as appropriate: allergies, current medications, past family history, past medical history, past social history, past surgical history and problem list     Objective   /80   Pulse 80   Temp 98 °F (36 7 °C)   Resp 20   Ht 5' 1" (1 549 m)   Wt 70 3 kg (155 lb)   SpO2 100%   BMI 29 29 kg/m²        Physical Exam     Physical Exam  Vitals signs and nursing note reviewed  Constitutional:       General: He is not in acute distress       Appearance: He is not ill-appearing  Cardiovascular:      Rate and Rhythm: Normal rate and regular rhythm  Pulmonary:      Effort: Pulmonary effort is normal       Breath sounds: Normal breath sounds  Abdominal:      Tenderness: There is no abdominal tenderness  There is no right CVA tenderness or left CVA tenderness  Neurological:      Mental Status: He is alert

## 2021-06-09 NOTE — PATIENT INSTRUCTIONS
Infección del tracto urinario en los hombres   LO QUE NECESITA SABER:   Sj infección en el tracto urinario (ITU) ocurre cuando entran bacterias en rosa tracto urinario  La mayoría de las bacterias que entran al tracto urinario salen al Jonathan Cancer  Si la bacteria permanece en el tracto urinario, usted podría contraer Pitney Sameer  Rosa tracto urinario incluye florin riñones, uréteres, vejiga y Gondregnies  La orina es producida en los riñones y fluye del uréter a la vejiga  La orina sale de la vejiga a través de la uretra  Sj infección del tracto urinario es más común in [place] parte inferior de rosa tracto urinario que incluye rosa vejiga y uretra  INSTRUCCIONES SOBRE EL CAITLIN HOSPITALARIA:   Regrese a la cristian de emergencias si:  · Usted está orinando muy poco o nada en absoluto  · Usted tiene fiebre caitlin con temblor y escalofríos  · Usted tiene dolor en el costado o en la espalda que ANETTESDDONNA  Comuníquese con rosa médico si:  · Usted tiene fiebre leve  · Usted no siente mejoría después de 2 días de samantha los antibióticos  · Usted está vomitando  · Usted tiene síntomas nuevos, tales flako jeanette o pus en la orina  · Usted tiene preguntas o inquietudes acerca de rosa condición o cuidado  Medicamentos:  · Los antibióticos ayudan a combatir sj infección bacteriana  · Los medicamentos para disminuir el dolor y el ardor al orinar  También ayudarán a disminuir la sensación de necesitar orinar con frecuencia  Estos medicamentos harán que orine de color anaranjado o hughes  · Unicoi florin medicamentos flako se le haya indicado  Consulte con rosa médico si usted ai que rosa medicamento no le está ayudando o si presenta efectos secundarios  Infórmele si es alérgico a algún medicamento  Mantenga sj lista actualizada de los [place], las vitaminas y los productos herbales que hanh  Incluya los siguientes datos de los medicamentos: cantidad, frecuencia y motivo de administración   Corita Lota con usted la lista o los envases de las píldoras a florin citas de seguimiento  Lleve la lista de los medicamentos con usted en michi de sj emergencia  Evite otra ITU:  · Vacíe la vejiga con frecuencia  Orine y vacíe la vejiga tan pronto flako usted sienta la necesidad  No retenga la orina por largos períodos de [place]  · [address] se le haya indicado  Pregunte cuánto líquido debe samantha cada día y cuáles líquidos son los más adecuados para usted  Es probable que usted necesite samantha más líquidos de lo habitual para ayudar a deshacerse de la bacteria  No tome alcohol, cafeína ni jugos cítricos  Estos pueden irritar rosa vejiga y aumentar florin síntomas  Rosa médico puede recomendarle el jugo de arándano para prevenir sj infección Cinthia Jabier  · Orine después de [employer]  [place] puede ayudar a eliminar las bacterias que pasan ramesh el sexo  · Realice ejercicios para los músculos pélvicos con frecuencia  Los ejercicios para los músculos pélvicos ayudan a empezar y parar de orinar  Los músculos pélvicos mayito ayudan a vaciar la vejiga más fácilmente  Apriete estos músculos firmemente ramesh 5 segundos flako si estuviera tratando de retener el flujo de [place]  Luego relaje por 5 segundos  Aumente gradualmente a 10 segundos  Dhiraj 3 series de 15 repeticiones al día o flako se le indique  Acuda a florin consultas de control con rosa médico según le indicaron  Anote florin preguntas para que se acuerde de hacerlas ramesh florin visitas  © Copyright [address] Information is for End User's use only and may not be sold, redistributed or otherwise used for commercial purposes  All illustrations and images included in CareNotes® are the copyrighted property of [employer]  or [address] es sólo para uso en educación  Rosa intención no es darle un consejo médico sobre enfermedades o tratamientos   Colsulte con rosa [place] farmacéutico antes de seguir cualquier régimen médico para saber si es seguro y efectivo para usted

## 2021-06-11 LAB — BACTERIA UR CULT: NORMAL

## 2021-06-16 ENCOUNTER — TELEPHONE (OUTPATIENT)
Dept: URGENT CARE | Age: 86
End: 2021-06-16

## 2021-08-04 NOTE — ED NOTES
EKG DONE ON PATIENT RESULTS GIVEN TO DR MALLORY Muniz  12/02/18 3554 Patient called RN in the room, states she feels warm and feels like she has a fever. Patient also complaining of headache. Temp taken, 99.3 orally denies any other discomforts. Medicated with PRN tylenol for headache at this time and will continue to monitor. Assisted patient to use [facility] & returned to bed. Call light and personal items placed within reach.

## 2021-08-05 ENCOUNTER — APPOINTMENT (EMERGENCY)
Dept: CT IMAGING | Facility: HOSPITAL | Age: 86
End: 2021-08-05
Payer: COMMERCIAL

## 2021-08-05 ENCOUNTER — HOSPITAL ENCOUNTER (INPATIENT)
Facility: HOSPITAL | Age: 86
LOS: 5 days | Discharge: HOME | DRG: 378 | End: 2021-08-10
Attending: INTERNAL MEDICINE | Admitting: INTERNAL MEDICINE
Payer: COMMERCIAL

## 2021-08-05 ENCOUNTER — HOSPITAL ENCOUNTER (EMERGENCY)
Facility: HOSPITAL | Age: 86
End: 2021-08-05
Attending: EMERGENCY MEDICINE | Admitting: EMERGENCY MEDICINE
Payer: COMMERCIAL

## 2021-08-05 VITALS
SYSTOLIC BLOOD PRESSURE: 152 MMHG | TEMPERATURE: 98.3 F | DIASTOLIC BLOOD PRESSURE: 74 MMHG | HEART RATE: 54 BPM | RESPIRATION RATE: 16 BRPM | BODY MASS INDEX: 27.57 KG/M2 | WEIGHT: 145.9 LBS | OXYGEN SATURATION: 98 %

## 2021-08-05 DIAGNOSIS — K40.90 INGUINAL HERNIA: ICD-10-CM

## 2021-08-05 DIAGNOSIS — I10 ACCELERATED ESSENTIAL HYPERTENSION: ICD-10-CM

## 2021-08-05 DIAGNOSIS — K62.5 RECTAL BLEEDING: Primary | ICD-10-CM

## 2021-08-05 DIAGNOSIS — K62.89 PROCTITIS: ICD-10-CM

## 2021-08-05 DIAGNOSIS — A49.8 CLOSTRIDIUM DIFFICILE INFECTION: ICD-10-CM

## 2021-08-05 PROBLEM — E78.5 HYPERLIPIDEMIA: Status: ACTIVE | Noted: 2021-08-05

## 2021-08-05 LAB
ALBUMIN SERPL BCP-MCNC: 2.8 G/DL (ref 3–5.2)
ALBUMIN SERPL BCP-MCNC: 4 G/DL (ref 3–5.2)
ALP SERPL-CCNC: 45 U/L (ref 43–122)
ALP SERPL-CCNC: 59 U/L (ref 43–122)
ALT SERPL W P-5'-P-CCNC: 13 U/L
ALT SERPL W P-5'-P-CCNC: 19 U/L
ANION GAP SERPL CALCULATED.3IONS-SCNC: 4 MMOL/L (ref 5–14)
ANION GAP SERPL CALCULATED.3IONS-SCNC: 8 MMOL/L (ref 5–14)
APTT PPP: 31 SECONDS (ref 23–37)
AST SERPL W P-5'-P-CCNC: 26 U/L (ref 17–59)
AST SERPL W P-5'-P-CCNC: 42 U/L (ref 17–59)
ATRIAL RATE: 49 BPM
BILIRUB SERPL-MCNC: 0.73 MG/DL
BILIRUB SERPL-MCNC: 1.06 MG/DL
BUN SERPL-MCNC: 18 MG/DL (ref 5–25)
BUN SERPL-MCNC: 23 MG/DL (ref 5–25)
CALCIUM ALBUM COR SERPL-MCNC: 8.1 MG/DL (ref 8.3–10.1)
CALCIUM SERPL-MCNC: 7.1 MG/DL (ref 8.4–10.2)
CALCIUM SERPL-MCNC: 9.2 MG/DL (ref 8.4–10.2)
CHLORIDE SERPL-SCNC: 101 MMOL/L (ref 97–108)
CHLORIDE SERPL-SCNC: 94 MMOL/L (ref 97–108)
CO2 SERPL-SCNC: 39 MMOL/L (ref 22–30)
CO2 SERPL-SCNC: 40 MMOL/L (ref 22–30)
CREAT SERPL-MCNC: 0.74 MG/DL (ref 0.7–1.5)
CREAT SERPL-MCNC: 0.92 MG/DL (ref 0.7–1.5)
EOSINOPHIL # BLD AUTO: 0.68 THOUSAND/UL (ref 0–0.4)
EOSINOPHIL NFR BLD MANUAL: 10 % (ref 0–6)
ERYTHROCYTE [DISTWIDTH] IN BLOOD BY AUTOMATED COUNT: 14.5 %
ERYTHROCYTE [DISTWIDTH] IN BLOOD BY AUTOMATED COUNT: 14.8 %
GFR SERPL CREATININE-BSD FRML MDRD: 77 ML/MIN/1.73SQ M
GFR SERPL CREATININE-BSD FRML MDRD: 85 ML/MIN/1.73SQ M
GLUCOSE SERPL-MCNC: 133 MG/DL (ref 70–99)
GLUCOSE SERPL-MCNC: 81 MG/DL (ref 70–99)
HCT VFR BLD AUTO: 43.9 % (ref 41–53)
HCT VFR BLD AUTO: 47.6 % (ref 41–53)
HGB BLD-MCNC: 13.8 G/DL (ref 13.5–17.5)
HGB BLD-MCNC: 14.2 G/DL (ref 12–17)
HGB BLD-MCNC: 15.3 G/DL (ref 13.5–17.5)
INR PPP: 1.03 (ref 0.84–1.19)
LYMPHOCYTES # BLD AUTO: 1.22 THOUSAND/UL (ref 0.5–4)
LYMPHOCYTES # BLD AUTO: 18 % (ref 25–45)
MACROCYTES BLD QL AUTO: PRESENT
MCH RBC QN AUTO: 31.7 PG (ref 26.8–34.3)
MCH RBC QN AUTO: 32.3 PG (ref 26.8–34.3)
MCHC RBC AUTO-ENTMCNC: 31.5 G/DL (ref 31.4–37.4)
MCHC RBC AUTO-ENTMCNC: 32.2 G/DL (ref 31.4–37.4)
MCV RBC AUTO: 100 FL (ref 80–100)
MCV RBC AUTO: 101 FL (ref 80–100)
MONOCYTES # BLD AUTO: 0.82 THOUSAND/UL (ref 0.2–0.9)
MONOCYTES NFR BLD AUTO: 12 % (ref 1–10)
NEUTS BAND NFR BLD MANUAL: 6 % (ref 0–8)
NEUTS SEG # BLD: 4.08 THOUSAND/UL (ref 1.8–7.8)
NEUTS SEG NFR BLD AUTO: 54 %
P AXIS: 76 DEGREES
PLATELET # BLD AUTO: 132 THOUSANDS/UL (ref 150–450)
PLATELET # BLD AUTO: 144 THOUSANDS/UL (ref 150–450)
PLATELET BLD QL SMEAR: ABNORMAL
PMV BLD AUTO: 7.6 FL (ref 8.9–12.7)
PMV BLD AUTO: 7.6 FL (ref 8.9–12.7)
POTASSIUM SERPL-SCNC: 3.2 MMOL/L (ref 3.6–5)
POTASSIUM SERPL-SCNC: 4.6 MMOL/L (ref 3.6–5)
PR INTERVAL: 182 MS
PROT SERPL-MCNC: 5.6 G/DL (ref 5.9–8.4)
PROT SERPL-MCNC: 7.9 G/DL (ref 5.9–8.4)
PROTHROMBIN TIME: 13.6 SECONDS (ref 11.6–14.5)
QRS AXIS: 79 DEGREES
QRSD INTERVAL: 82 MS
QT INTERVAL: 466 MS
QTC INTERVAL: 420 MS
RBC # BLD AUTO: 4.35 MILLION/UL (ref 4.5–5.9)
RBC # BLD AUTO: 4.75 MILLION/UL (ref 4.5–5.9)
RBC MORPH BLD: ABNORMAL
SODIUM SERPL-SCNC: 142 MMOL/L (ref 137–147)
SODIUM SERPL-SCNC: 144 MMOL/L (ref 137–147)
T WAVE AXIS: 66 DEGREES
TOTAL CELLS COUNTED SPEC: 100
TROPONIN I SERPL-MCNC: 0.02 NG/ML (ref 0–0.03)
VENTRICULAR RATE: 49 BPM
WBC # BLD AUTO: 6.1 THOUSAND/UL (ref 4.31–10.16)
WBC # BLD AUTO: 6.8 THOUSAND/UL (ref 4.31–10.16)

## 2021-08-05 PROCEDURE — 80053 COMPREHEN METABOLIC PANEL: CPT | Performed by: PHYSICIAN ASSISTANT

## 2021-08-05 PROCEDURE — 93005 ELECTROCARDIOGRAM TRACING: CPT

## 2021-08-05 PROCEDURE — 74177 CT ABD & PELVIS W/CONTRAST: CPT

## 2021-08-05 PROCEDURE — 82272 OCCULT BLD FECES 1-3 TESTS: CPT

## 2021-08-05 PROCEDURE — 99284 EMERGENCY DEPT VISIT MOD MDM: CPT | Performed by: PHYSICIAN ASSISTANT

## 2021-08-05 PROCEDURE — 96360 HYDRATION IV INFUSION INIT: CPT

## 2021-08-05 PROCEDURE — 36415 COLL VENOUS BLD VENIPUNCTURE: CPT | Performed by: PHYSICIAN ASSISTANT

## 2021-08-05 PROCEDURE — 85027 COMPLETE CBC AUTOMATED: CPT | Performed by: PHYSICIAN ASSISTANT

## 2021-08-05 PROCEDURE — 84484 ASSAY OF TROPONIN QUANT: CPT | Performed by: PHYSICIAN ASSISTANT

## 2021-08-05 PROCEDURE — 96361 HYDRATE IV INFUSION ADD-ON: CPT

## 2021-08-05 PROCEDURE — 85007 BL SMEAR W/DIFF WBC COUNT: CPT | Performed by: PHYSICIAN ASSISTANT

## 2021-08-05 PROCEDURE — NC001 PR NO CHARGE: Performed by: INTERNAL MEDICINE

## 2021-08-05 PROCEDURE — 85018 HEMOGLOBIN: CPT

## 2021-08-05 PROCEDURE — 85730 THROMBOPLASTIN TIME PARTIAL: CPT | Performed by: PHYSICIAN ASSISTANT

## 2021-08-05 PROCEDURE — 93010 ELECTROCARDIOGRAM REPORT: CPT | Performed by: INTERNAL MEDICINE

## 2021-08-05 PROCEDURE — 99285 EMERGENCY DEPT VISIT HI MDM: CPT

## 2021-08-05 PROCEDURE — 85610 PROTHROMBIN TIME: CPT | Performed by: PHYSICIAN ASSISTANT

## 2021-08-05 RX ORDER — ALBUTEROL SULFATE 2.5 MG/3ML
2.5 SOLUTION RESPIRATORY (INHALATION) EVERY 6 HOURS PRN
Status: DISCONTINUED | OUTPATIENT
Start: 2021-08-05 | End: 2021-08-10 | Stop reason: HOSPADM

## 2021-08-05 RX ORDER — SODIUM CHLORIDE 9 MG/ML
75 INJECTION, SOLUTION INTRAVENOUS CONTINUOUS
Status: DISCONTINUED | OUTPATIENT
Start: 2021-08-05 | End: 2021-08-08

## 2021-08-05 RX ORDER — METOPROLOL TARTRATE 50 MG/1
50 TABLET, FILM COATED ORAL EVERY 12 HOURS SCHEDULED
Status: DISCONTINUED | OUTPATIENT
Start: 2021-08-05 | End: 2021-08-10 | Stop reason: HOSPADM

## 2021-08-05 RX ORDER — PANTOPRAZOLE SODIUM 40 MG/1
40 INJECTION, POWDER, FOR SOLUTION INTRAVENOUS
Status: DISCONTINUED | OUTPATIENT
Start: 2021-08-06 | End: 2021-08-06

## 2021-08-05 RX ORDER — HYDRALAZINE HYDROCHLORIDE 20 MG/ML
5 INJECTION INTRAMUSCULAR; INTRAVENOUS EVERY 6 HOURS PRN
Status: DISCONTINUED | OUTPATIENT
Start: 2021-08-05 | End: 2021-08-10 | Stop reason: HOSPADM

## 2021-08-05 RX ORDER — LOSARTAN POTASSIUM 50 MG/1
100 TABLET ORAL
Status: DISCONTINUED | OUTPATIENT
Start: 2021-08-05 | End: 2021-08-08

## 2021-08-05 RX ORDER — BUDESONIDE AND FORMOTEROL FUMARATE DIHYDRATE 160; 4.5 UG/1; UG/1
2 AEROSOL RESPIRATORY (INHALATION) 2 TIMES DAILY
Status: DISCONTINUED | OUTPATIENT
Start: 2021-08-05 | End: 2021-08-10 | Stop reason: HOSPADM

## 2021-08-05 RX ORDER — ACETAMINOPHEN 325 MG/1
650 TABLET ORAL EVERY 6 HOURS PRN
Status: DISCONTINUED | OUTPATIENT
Start: 2021-08-05 | End: 2021-08-10 | Stop reason: HOSPADM

## 2021-08-05 RX ORDER — AMLODIPINE BESYLATE 10 MG/1
10 TABLET ORAL DAILY
Status: DISCONTINUED | OUTPATIENT
Start: 2021-08-06 | End: 2021-08-08

## 2021-08-05 RX ORDER — ATORVASTATIN CALCIUM 10 MG/1
10 TABLET, FILM COATED ORAL
Status: DISCONTINUED | OUTPATIENT
Start: 2021-08-06 | End: 2021-08-10 | Stop reason: HOSPADM

## 2021-08-05 RX ADMIN — LOSARTAN POTASSIUM 100 MG: 50 TABLET, FILM COATED ORAL at 18:59

## 2021-08-05 RX ADMIN — METOPROLOL TARTRATE 50 MG: 50 TABLET, FILM COATED ORAL at 20:17

## 2021-08-05 RX ADMIN — SODIUM CHLORIDE 75 ML/HR: 0.9 INJECTION, SOLUTION INTRAVENOUS at 19:00

## 2021-08-05 RX ADMIN — BUDESONIDE AND FORMOTEROL FUMARATE DIHYDRATE 2 PUFF: 160; 4.5 AEROSOL RESPIRATORY (INHALATION) at 19:39

## 2021-08-05 RX ADMIN — SODIUM CHLORIDE 1000 ML: 0.9 INJECTION, SOLUTION INTRAVENOUS at 09:39

## 2021-08-05 RX ADMIN — IOHEXOL 100 ML: 350 INJECTION, SOLUTION INTRAVENOUS at 11:19

## 2021-08-05 NOTE — ASSESSMENT & PLAN NOTE
· On chronic supplemental oxygen at 2 L at home, currently saturating mid 90s on 2 L  · There is no signs or symptoms of exacerbation on exam  · Continue home regimen with Symbicort, Spiriva and albuterol  · Continue monitor clinically

## 2021-08-05 NOTE — ASSESSMENT & PLAN NOTE
· Blood pressure is elevated on presentation 180s/100s   · Continue home regimen with Lopressor 50 mg daily, losartan 100 mg daily Norvasc 10 mg daily  · Will add hydralazine for sbp >180  · Continue monitor vital signs

## 2021-08-05 NOTE — ASSESSMENT & PLAN NOTE
· POA:  Bloody bowel movement initially noticed this morning, no associated dizziness, lightheadedness, nausea, vomiting, diarrhea or abdominal pain  · Patient's bleeding was probably consistent with diverticular bleed  · Hemodynamically stable on presentation  · Hemoglobin on presentation 15 3, repeated 13 8  · ALEXSANDER showed dry blood around the anus, external hemorrhoids  · CT abdomen/pelvis showed proctitis, 4 6 cm subcutaneous enhancing rim which could be representing abdominal mesh given the patient history of hernia repair    · Previous CT 5/29/20 showed severe diverticular disease throughout the large intestine  · Will monitor H&H QA, transfuse for hb <7   · Maintain two wide por IVS   · Clear liquid diet, NPO at midnight  · No previous colonoscopy in file   · Gastroenterology consult

## 2021-08-05 NOTE — ED NOTES
Pt had moderate bloody stool  RIZWANA Levin informed and also visualized specimen        Lynne Iqbal, RN  08/05/21 [id number]

## 2021-08-05 NOTE — H&P
[address]  [facility] 82 y [facility]  male MRN: [id number]  Unit/Bed#: ICU 07 Encounter: [id number]  Primary Care Provider: Juan Antonio Solis MD   Date and time admitted to hospital: 8/5/2021  5:26 PM    * Rectal bleeding  Assessment & Plan  · POA:  Bloody bowel movement initially noticed this morning, no associated dizziness, lightheadedness, nausea, vomiting, diarrhea or abdominal pain  · Patient's bleeding was probably consistent with diverticular bleed  · Hemodynamically stable on presentation  · Hemoglobin on presentation 15 3, repeated 13 8  · ALEXSANDER showed dry blood around the anus, external hemorrhoids  · CT abdomen/pelvis showed proctitis, 4 6 cm subcutaneous enhancing rim which could be representing abdominal mesh given the patient history of hernia repair    · Previous CT 5/29/20 showed severe diverticular disease throughout the large intestine  · Will monitor H&H QA, transfuse for hb <7   · Maintain two wide por IVS   · Clear liquid diet, NPO at midnight  · No previous colonoscopy in file   · Gastroenterology consult    COPD (chronic obstructive pulmonary disease) ([facility] [address] )  Assessment & Plan  · On chronic supplemental oxygen at 2 L at home, currently saturating mid 90s on 2 L  · There is no signs or symptoms of exacerbation on exam  · Continue home regimen with Symbicort, Spiriva and albuterol  · Continue monitor clinically    Benign essential hypertension  Assessment & Plan  · Blood pressure is elevated on presentation 180s/100s   · Continue home regimen with Lopressor 50 mg daily, losartan 100 mg daily Norvasc 10 mg daily  · Will add hydralazine for sbp >180  · Continue monitor vital signs    Thrombocytopenia (HCC)  Assessment & Plan  · Platelet count 539 on presentation, patient has chronic mild thrombocytopenia currently around baseline  · Continue monitor CBC    Hyperlipidemia  Assessment & Plan  · Continue statin    -------------------------------------------------------------------------------------------------------------  Chief Complaint:  Rectal bleeding    History of Present Illness   Horacio Villar is a 82 y o  male who presented as a transfer from [facility] ED, patient initially presented complaining of bloody bowel movements that started this morning, he has a past medical history significant for hypertension, hyperlipidemia, COPD on chronic supplemental oxygen  On presentation patient denies of any fevers, chills, lightheadedness or dizziness, abdominal pain, nausea, vomiting or diarrhea  Patient was hemodynamically stable on presentation, slightly hypertensive  Initial blood work showed a hemoglobin level of 15 3 otherwise unremarkable there is mild thrombocytopenia which is chronic for the patient and is currently stable  Patient received initial IVF, repeated CBC showed further decline of his hemoglobin level 13 8  Will continue monitor patient hemoglobin level, continue gentle IVF hydration, NPO midnight for Gastroenterology evaluation for possible colonoscopy  ( [id number] was used for interpretation)  History obtained from chart review and the patient   -------------------------------------------------------------------------------------------------------------  Dispo: Admit to Stepdown Level 1    Code Status: Level 1 - Full Code  --------------------------------------------------------------------------------------------------------------  Review of Systems   Constitutional: Negative for activity change, appetite change, chills, fatigue and fever  HENT: Negative for congestion  Respiratory: Negative for cough, chest tightness, shortness of breath and wheezing  Cardiovascular: Negative for chest pain, palpitations and leg swelling  Gastrointestinal: Positive for anal bleeding and blood in stool   Negative for abdominal pain, constipation, diarrhea, nausea and vomiting  Genitourinary: Negative for dysuria  Neurological: Negative for dizziness, weakness and light-headedness  Psychiatric/Behavioral: The patient is not nervous/anxious  A 12-point, complete review of systems was reviewed and negative except as stated above     Physical Exam  Constitutional:       General: He is not in acute distress  Appearance: Normal appearance  He is not ill-appearing  HENT:      Head: Normocephalic and atraumatic  Mouth/Throat:      Mouth: Mucous membranes are moist       Pharynx: No oropharyngeal exudate or posterior oropharyngeal erythema  Eyes:      Extraocular Movements: Extraocular movements intact  Pupils: Pupils are equal, round, and reactive to light  Cardiovascular:      Rate and Rhythm: Normal rate  Pulses: Normal pulses  Heart sounds: Normal heart sounds  No murmur heard  Pulmonary:      Effort: Pulmonary effort is normal  No respiratory distress  Breath sounds: Normal breath sounds  No wheezing  Abdominal:      General: There is no distension  Palpations: Abdomen is soft  Tenderness: There is no abdominal tenderness  There is no guarding  Comments: External hemorrhoids noted, dried blood in the rectal vault and around the anus   Musculoskeletal:         General: No swelling or tenderness  Skin:     General: Skin is dry  Coloration: Skin is not jaundiced or pale  Neurological:      General: No focal deficit present  Mental Status: He is alert and oriented to person, place, and time     Psychiatric:         Mood and Affect: Mood normal          Behavior: Behavior normal        --------------------------------------------------------------------------------------------------------------  Vitals:   Vitals:    08/05/21 1733 08/05/21 1800   BP: (!) 178/82 142/71   Pulse: 66 (!) 54   Resp: (!) 32 (!) 31   Temp: 97 8 °F (36 6 °C)    TempSrc: Tympanic    SpO2: 98% 97%   Weight: 64 5 kg (142 lb 3 2 oz) Height: 5' 1" (1 549 m)      Temp  Min: 97 8 °F (36 6 °C)  Max: 98 3 °F (36 8 °C)  IBW (Ideal Body Weight): 52 3 kg  Height: 5' 1" (154 9 cm)  Body mass index is 26 87 kg/m²  Laboratory and Diagnostics:  Results from last 7 days   Lab Units 08/05/21  1233 08/05/21  0934   WBC Thousand/uL 6 10 6 80   HEMOGLOBIN g/dL 13 8 15 3   HEMATOCRIT % 43 9 47 6   PLATELETS Thousands/uL 132* 144*   BANDS PCT %  --  6   MONO PCT %  --  12*     Results from last 7 days   Lab Units 08/05/21  1555 08/05/21  0934   SODIUM mmol/L 144 142   POTASSIUM mmol/L 3 2* 4 6   CHLORIDE mmol/L 101 94*   CO2 mmol/L 39* 40*   ANION GAP mmol/L 4* 8   BUN mg/dL 18 23   CREATININE mg/dL 0 74 0 92   CALCIUM mg/dL 7 1* 9 2   GLUCOSE RANDOM mg/dL 81 133*   ALT U/L 13 19   AST U/L 26 42   ALK PHOS U/L 45 59   ALBUMIN g/dL 2 8* 4 0   TOTAL BILIRUBIN mg/dL 0 73 1 06          Results from last 7 days   Lab Units 08/05/21  0934   INR  1 03   PTT seconds 31      Results from last 7 days   Lab Units 08/05/21  0934   TROPONIN I ng/mL 0 02         ABG:    VBG:          Micro:        EKG:  Normal sinus rhythm, no ischemic changes  Imaging: I have personally reviewed pertinent reports          Historical Information   Past Medical History:   Diagnosis Date    Acute metabolic encephalopathy 5/66/5451    Appendicolith     Ascending aortic aneurysm (HCC)     3 7    Asthma     BPH (benign prostatic hyperplasia)     CAD (coronary artery disease)     noted on CT scan    COPD (chronic obstructive pulmonary disease) (HCC)     Descending thoracic aortic aneurysm (HCC)     Diverticulosis     Former tobacco use     GERD (gastroesophageal reflux disease)     History of DVT (deep vein thrombosis)     Left leg    History of transfusion     Hypertension     Inguinal hernia     right    Nephrolithiasis     Oxygen dependent     2LNC    Prostate calculus     PVD (peripheral vascular disease) (HCC)     Ulcer     Varicose vein of leg     b/l     Past Surgical History:   Procedure Laterality Date    CARDIAC SURGERY      ESOPHAGOGASTRODUODENOSCOPY      HERNIA REPAIR Right 2019    Procedure: REPAIR HERNIA INGUINAL WITH MESH;  Surgeon: Grover Thomas MD;  [facility] MAIN OR;  Service: General    INGUINAL HERNIA REPAIR Bilateral     IR TEVAR  2018    WI ENDOVASC TAA REINCL SUBCL N/A 2018    Procedure: TEVAR - endovascular thoracic aortic aneurysm repair;  Surgeon: Teri Rivera MD;  Location:  MAIN OR;  Service: Vascular    THORACIC AORTIC ANEURYSM REPAIR  2018    VARICOSE VEIN SURGERY Bilateral     vein stripping     Social History   Social History     Substance and Sexual Activity   Alcohol Use Never     Social History     Substance and Sexual Activity   Drug Use No     Social History     Tobacco Use   Smoking Status Former Smoker    Packs/day: 1 00    Years: 35 00    Pack years: 35 00    Start date:     Quit date:     Years since quittin 6   Smokeless Tobacco Never Used       Family History:   Family History   Problem Relation Age of Onset    Tuberculosis Mother     No Known Problems Father     Cancer Sister     Diabetes Family     Hypertension Family      I have reviewed this patient's family history and commented on sigificant items within the HPI      Medications:  Current Facility-Administered Medications   Medication Dose Route Frequency    acetaminophen (TYLENOL) tablet 650 mg  650 mg Oral Q6H PRN    albuterol inhalation solution 2 5 mg  2 5 mg Nebulization Q6H PRN    [START ON 2021] amLODIPine (NORVASC) tablet 10 mg  10 mg Oral Daily    [START ON 2021] atorvastatin (LIPITOR) tablet 10 mg  10 mg Oral Daily    budesonide-formoterol (SYMBICORT) 160-4 5 mcg/act inhaler 2 puff  2 puff Inhalation BID    hydrALAZINE (APRESOLINE) injection 5 mg  5 mg Intravenous Q6H PRN    losartan (COZAAR) tablet 100 mg  100 mg Oral After Dinner    metoprolol tartrate (LOPRESSOR) tablet 50 mg  50 mg Oral Q12H [address]    [START ON 8/6/2021] pantoprazole (PROTONIX) injection 40 mg  40 mg Intravenous Q24H [address]    [START ON 8/6/2021] tiotropium (SPIRIVA) capsule for inhaler 18 mcg  18 mcg Inhalation Daily     Home medications:  Prior to Admission Medications   Prescriptions Last Dose Informant Patient Reported? Taking?    ASPIRIN 81 81 MG EC tablet   No No   Sig: TAKE ONE TABLET BY MOUTH ONCE DAILY   acetaminophen (TYLENOL) 325 mg tablet  Self No No   Sig: Take 2 tablets (650 mg total) by mouth every 6 (six) hours as needed for fever (temperature greater than 101 F)   albuterol (2 5 mg/3 mL) 0 083 % nebulizer solution  Self No No   Sig: Take 1 vial (2 5 mg total) by nebulization every 6 (six) hours as needed for wheezing or shortness of breath   albuterol (VENTOLIN HFA) 90 mcg/act inhaler  Self Yes No   Sig: Inhale 2 puffs   amLODIPine (NORVASC) 10 mg tablet  Self No No   Sig: Take 1 tablet (10 mg total) by mouth daily   atorvastatin (LIPITOR) 10 mg tablet  Self No No   Sig: Take 1 tablet (10 mg total) by mouth daily   budesonide-formoterol (SYMBICORT) 160-4 5 mcg/act inhaler  Self No No   Sig: Inhale 2 puffs 2 (two) times a day Rinse mouth after use    hydrochlorothiazide (HYDRODIURIL) 12 5 mg tablet  Self No No   Sig: Take 1 tablet (12 5 mg total) by mouth daily after breakfast   Patient not taking: Reported on 9/24/2020   losartan (COZAAR) 100 MG tablet  Self No No   Sig: Take 1 tablet (100 mg total) by mouth daily after dinner   melatonin 3 mg  Self No No   Sig: Take 2 tablets (6 mg total) by mouth daily at bedtime   Patient not taking: Reported on 6/19/2020   metoprolol tartrate (LOPRESSOR) 50 mg tablet  Self No No   Sig: Take 1 tablet (50 mg total) by mouth every 12 (twelve) hours   pantoprazole (PROTONIX) 40 mg tablet  Self No No   Sig: Take 1 tablet (40 mg total) by mouth daily   tiotropium (SPIRIVA) 18 mcg inhalation capsule  Self No No   Sig: Place 1 capsule (18 mcg total) into inhaler and inhale daily Facility-Administered Medications: None     Allergies: Allergies   Allergen Reactions    Penicillins Hives, Itching and Rash    Lisinopril Rash     Side pains and rash        ------------------------------------------------------------------------------------------------------------  Advance Directive and Living Will:      Power of :    POLST:    ------------------------------------------------------------------------------------------------------------  Anticipated Length of Stay is > 2 midnights    Care Time Delivered:   Upon my evaluation, this patient had a high probability of imminent or life-threatening deterioration due to Rectal bleeding, which required my direct attention, intervention, and personal management  I have personally provided 45 minutes (30 to 45) of critical care time, exclusive of procedures, teaching, family meetings, and any prior time recorded by providers other than myself  Kranthi Billings MD        Portions of the record may have been created with voice recognition software  Occasional wrong word or "sound a like" substitutions may have occurred due to the inherent limitations of voice recognition software    Read the chart carefully and recognize, using context, where substitutions have occurred

## 2021-08-05 NOTE — ASSESSMENT & PLAN NOTE
· Platelet count 634 on presentation, patient has chronic mild thrombocytopenia currently around baseline  · Continue monitor CBC

## 2021-08-05 NOTE — EMTALA/ACUTE CARE TRANSFER
[facility] EMERGENCY DEPARTMENT  [address] [facility] [address]  [phone]  Dept: 628 \[facility]\"" TRANSFER CONSENT    NAME Verdon Osler                                         1935                              MRN [id number]    I have been informed of my rights regarding examination, treatment, and transfer   by Dr Chantelle Mccarty DO    Benefits: Specialized equipment and/or services available at the receiving facility (Include comment)________________________    Risks: Potential for delay in receiving treatment      Consent for Transfer:  I acknowledge that my medical condition has been evaluated and explained to me by the emergency department physician or other qualified medical person and/or my attending physician, who has recommended that I be transferred to the service of  Accepting Physician: Tammie Marquis at [address] [place] [address] Name, [address] : [address]  The above potential benefits of such transfer, the potential risks associated with such transfer, and the probable risks of not being transferred have been explained to me, and I fully understand them  The doctor has explained that, in my case, the benefits of transfer outweigh the risks  I agree to be transferred  I authorize the performance of emergency medical procedures and treatments upon me in both transit and upon arrival at the receiving facility  Additionally, I authorize the release of any and all medical records to the receiving facility and request they be transported with me, if possible  I understand that the safest mode of transportation during a medical emergency is an ambulance and that the Hospital advocates the use of this mode of transport  Risks of traveling to the receiving facility by car, including absence of medical control, life sustaining equipment, such as oxygen, and medical personnel has been explained to me and I fully understand them      ([place] BELOW)  [  ]  I consent to the stated transfer and to be transported by ambulance/helicopter  [  ]  I consent to the stated transfer, but refuse transportation by ambulance and accept full responsibility for my transportation by car  I understand the risks of non-ambulance transfers and I exonerate the Hospital and its staff from any deterioration in my condition that results from this refusal     X___________________________________________    DATE  21  TIME________  Signature of patient or legally responsible individual signing on patient behalf           RELATIONSHIP TO PATIENT_________________________          Provider Certification    NAME Fabian Yu                                        [facility] 1935                              MRN [id number]    A medical screening exam was performed on the above named patient  Based on the examination:    Condition Necessitating Transfer The primary encounter diagnosis was Rectal bleeding  Diagnoses of Proctitis and Inguinal hernia were also pertinent to this visit      Patient Condition: The patient has been stabilized such that within reasonable medical probability, no material deterioration of the patient condition or the condition of the unborn child(joselito) is likely to result from the transfer    Reason for Transfer: Level of Care needed not available at this facility    Transfer Requirements: [address] [facility]   · Space available and qualified personnel available for treatment as acknowledged by BRO Maddox  · Agreed to accept transfer and to provide appropriate medical treatment as acknowledged by       [facility]  · Appropriate medical records of the examination and treatment of the patient are provided at the time of transfer   [address] [facility], [address] _______  · Transfer will be performed by qualified personnel from    and appropriate transfer equipment as required, including the use of necessary and appropriate life support measures  Provider Certification: I have examined the patient and explained the following risks and benefits of being transferred/refusing transfer to the patient/family:  General risk, such as traffic hazards, adverse weather conditions, rough terrain or turbulence, possible failure of equipment (including vehicle or aircraft), or consequences of actions of persons outside the control of the transport personnel      Based on these reasonable risks and benefits to the patient and/or the unborn child(joselito), and based upon the information available at the time of the patients examination, I certify that the medical benefits reasonably to be expected from the provision of appropriate medical treatments at another medical facility outweigh the increasing risks, if any, to the individuals medical condition, and in the case of labor to the unborn child, from effecting the transfer      X____________________________________________ DATE 08/05/21        TIME_______      ORIGINAL - SEND TO MEDICAL RECORDS   COPY - SEND WITH PATIENT DURING TRANSFER

## 2021-08-05 NOTE — ED PROVIDER NOTES
History  Chief Complaint   Patient presents with    Rectal Bleeding     this AM after going to bathroom     Patient is an 82-year-old male past medical history significant for hypertension, hyperlipidemia, previous aneurysm repair, previous DVT, multiple previous abdominal surgeries including hernia repairs with mesh migration, patient also has a history of nephrolithiasis, ascending aortic aneurysm, COPD and CAD presents today for evaluation of rectal bleeding  Patient's son and wife accompanies the patient reporting that today the patient had a bowel movement which was normal for the patient however was accompanied by large volume of blood coating the toilet seat and the toilet  Patient denies any lightheadedness, dizziness, chest pain or palpitations  Patient reports no abdominal pain, nausea vomiting or diarrhea  Patient reports no known history of hemorrhoids however hemorrhoid is noted on physical exam   Patient reports no recent surgeries or abdominal trauma  History provided by:  Patient, spouse and relative   used: Yes        Prior to Admission Medications   Prescriptions Last Dose Informant Patient Reported? Taking?    ASPIRIN 81 81 MG EC tablet   No No   Sig: TAKE ONE TABLET BY MOUTH ONCE DAILY   acetaminophen (TYLENOL) 325 mg tablet  Self No No   Sig: Take 2 tablets (650 mg total) by mouth every 6 (six) hours as needed for fever (temperature greater than 101 F)   albuterol (2 5 mg/3 mL) 0 083 % nebulizer solution  Self No No   Sig: Take 1 vial (2 5 mg total) by nebulization every 6 (six) hours as needed for wheezing or shortness of breath   albuterol (VENTOLIN HFA) 90 mcg/act inhaler  Self Yes No   Sig: Inhale 2 puffs   amLODIPine (NORVASC) 10 mg tablet  Self No No   Sig: Take 1 tablet (10 mg total) by mouth daily   atorvastatin (LIPITOR) 10 mg tablet  Self No No   Sig: Take 1 tablet (10 mg total) by mouth daily   budesonide-formoterol (SYMBICORT) 160-4 5 mcg/act inhaler Self No No   Sig: Inhale 2 puffs 2 (two) times a day Rinse mouth after use    hydrochlorothiazide (HYDRODIURIL) 12 5 mg tablet  Self No No   Sig: Take 1 tablet (12 5 mg total) by mouth daily after breakfast   Patient not taking: Reported on 9/24/2020   losartan (COZAAR) 100 MG tablet  Self No No   Sig: Take 1 tablet (100 mg total) by mouth daily after dinner   melatonin 3 mg  Self No No   Sig: Take 2 tablets (6 mg total) by mouth daily at bedtime   Patient not taking: Reported on 6/19/2020   metoprolol tartrate (LOPRESSOR) 50 mg tablet  Self No No   Sig: Take 1 tablet (50 mg total) by mouth every 12 (twelve) hours   pantoprazole (PROTONIX) 40 mg tablet  Self No No   Sig: Take 1 tablet (40 mg total) by mouth daily   tiotropium (SPIRIVA) 18 mcg inhalation capsule  Self No No   Sig: Place 1 capsule (18 mcg total) into inhaler and inhale daily      Facility-Administered Medications: None       Past Medical History:   Diagnosis Date    Acute metabolic encephalopathy 0/27/8252    Appendicolith     Ascending aortic aneurysm (HCC)     3 7    Asthma     BPH (benign prostatic hyperplasia)     CAD (coronary artery disease)     noted on CT scan    COPD (chronic obstructive pulmonary disease) (HCC)     Descending thoracic aortic aneurysm ([address] )     Diverticulosis     Former tobacco use     GERD (gastroesophageal reflux disease)     History of DVT (deep vein thrombosis)     Left leg    History of transfusion     Hypertension     Inguinal hernia     right    Nephrolithiasis     Oxygen dependent     2LNC    Prostate calculus     [facility]peripheral vascular disease) [facility])     Ulcer     Varicose vein of leg     b/l       Past Surgical History:   Procedure Laterality Date    CARDIAC SURGERY      ESOPHAGOGASTRODUODENOSCOPY      HERNIA REPAIR Right 1/21/2019    Procedure: REPAIR HERNIA INGUINAL WITH MESH;  Surgeon: Valentina Linn MD;  Location:  MAIN OR;  Service: General    INGUINAL HERNIA REPAIR Bilateral     IR TEVAR  2018    TN ENDOVASC TAA REINCL SUBCL N/A 2018    Procedure: TEVAR - endovascular thoracic aortic aneurysm repair;  Surgeon: Vineet Larson MD;  Location: [facility];  Service: Vascular    THORACIC AORTIC ANEURYSM REPAIR  2018    VARICOSE VEIN SURGERY Bilateral     vein stripping       Family History   Problem Relation Age of Onset    Tuberculosis Mother     No Known Problems Father     Cancer Sister     Diabetes Family     Hypertension Family      I have reviewed and agree with the history as documented  E-Cigarette/Vaping    E-Cigarette Use Never User      E-Cigarette/Vaping Substances     Social History     Tobacco Use    Smoking status: Former Smoker     Packs/day: 1 00     Years: 35 00     Pack years: 35 00     Start date:      Quit date:      Years since quittin 6    Smokeless tobacco: Never Used   Vaping Use    Vaping Use: Never used   Substance Use Topics    Alcohol use: Never    Drug use: No       Review of Systems   Constitutional: Negative for chills, fatigue and fever  HENT: Negative for congestion, ear pain, rhinorrhea and sore throat  Eyes: Negative for redness  Respiratory: Negative for chest tightness and shortness of breath  Cardiovascular: Negative for chest pain and palpitations  Gastrointestinal: Positive for anal bleeding and blood in stool  Negative for abdominal pain, nausea and vomiting  Genitourinary: Negative for dysuria and hematuria  Musculoskeletal: Negative  Skin: Negative for rash  Neurological: Negative for dizziness, syncope, light-headedness and numbness  Physical Exam  Physical Exam  Vitals and nursing note reviewed  Constitutional:       Appearance: Normal appearance  He is well-developed  Comments: Well-appearing male in no acute distress appears older than stated age   HENT:      Head: Normocephalic and atraumatic  Eyes:      General: No scleral icterus       Pupils: Pupils are equal, round, and reactive to light  Cardiovascular:      Rate and Rhythm: Normal rate and regular rhythm  Pulses: Normal pulses  Pulmonary:      Effort: Pulmonary effort is normal  No respiratory distress  Breath sounds: No stridor  Abdominal:      General: There is no distension  Palpations: There is no mass  Genitourinary:     Rectum: Normal  Guaiac result positive  Comments: Bright red blood per rectum, rectal exam within normal limits the exception of positive guaiac result and blood noted  Stool present in the rectal vault, Prostate palpable, smooth  Musculoskeletal:      Cervical back: Normal range of motion  Skin:     General: Skin is warm and dry  Capillary Refill: Capillary refill takes less than 2 seconds  Coloration: Skin is not jaundiced  Neurological:      Mental Status: He is alert and oriented to person, place, and time        Gait: Gait normal    Psychiatric:         Mood and Affect: Mood normal      Patient's Son reports 3 bowel movements of this nature            Vital Signs  ED Triage Vitals [08/05/21 0918]   Temperature Pulse Respirations Blood Pressure SpO2   98 3 °F (36 8 °C) 69 20 (!) 185/108 95 %      Temp Source Heart Rate Source Patient Position - Orthostatic VS BP Location FiO2 (%)   Oral Monitor Lying Left arm --      Pain Score       --           Vitals:    08/05/21 0918 08/05/21 0941 08/05/21 1043 08/05/21 1451   BP: (!) 185/108 153/83 (!) 183/84 152/74   Pulse: 69 (!) 50 56 (!) 54   Patient Position - Orthostatic VS: Lying Lying Lying Lying         Visual Acuity      ED Medications  Medications   sodium chloride 0 9 % bolus 1,000 mL (0 mL Intravenous Stopped 8/5/21 1234)   iohexol (OMNIPAQUE) 350 MG/ML injection (SINGLE-DOSE) 100 mL (100 mL Intravenous Given 8/5/21 1119)       Diagnostic Studies  Results Reviewed     Procedure Component Value Units Date/Time    Comprehensive metabolic panel [[id number]]  (Abnormal) Collected: 08/05/21 2925    Lab Status: Final result Specimen: Blood from Arm, Right Updated: 08/05/21 1615     Sodium 144 mmol/L      Potassium 3 2 mmol/L      Chloride 101 mmol/L      CO2 39 mmol/L      ANION GAP 4 mmol/L      BUN 18 mg/dL      Creatinine 0 74 mg/dL      Glucose 81 mg/dL      Calcium 7 1 mg/dL      Corrected Calcium 8 1 mg/dL      AST 26 U/L      ALT 13 U/L      Alkaline Phosphatase 45 U/L      Total Protein 5 6 g/dL      Albumin 2 8 g/dL      Total Bilirubin 0 73 mg/dL      eGFR 85 ml/min/1 73sq m     Narrative:      [place] guidelines for Chronic Kidney Disease (CKD):     Stage 1 with normal or high GFR (GFR > 90 mL/min/1 73 square meters)    Stage 2 Mild CKD (GFR = 60-89 mL/min/1 73 square meters)    Stage 3A Moderate CKD (GFR = 45-59 mL/min/1 73 square meters)    Stage 3B Moderate CKD (GFR = 30-44 mL/min/1 73 square meters)    Stage 4 Severe CKD (GFR = 15-29 mL/min/1 73 square meters)    Stage 5 End Stage CKD (GFR <15 mL/min/1 73 square meters)  Note: GFR calculation is accurate only with a steady state creatinine    CBC and differential [[id number]]  (Abnormal) Collected: 08/05/21 1233    Lab Status: Final result Specimen: Blood from Arm, Right Updated: 08/05/21 1254     WBC 6 10 Thousand/uL      RBC 4 35 Million/uL      Hemoglobin 13 8 g/dL      Hematocrit 43 9 %       fL      MCH 31 7 pg      MCHC 31 5 g/dL      RDW 14 5 %      MPV 7 6 fL      Platelets 973 Thousands/uL     Narrative:      See Manual Diff Done Within 3 Days        Manual Differential (Non Wam) [[id number]]  (Abnormal) Collected: 08/05/21 0934    Lab Status: Final result Specimen: Blood from Arm, Right Updated: 08/05/21 1138     Segmented % 54 %      Bands % 6 %      Lymphocytes % 18 %      Monocytes % 12 %      Eosinophils, % 10 %      Neutrophils Absolute 4 08 Thousand/uL      Lymphocytes Absolute 1 22 Thousand/uL      Monocytes Absolute 0 82 Thousand/uL      Eosinophils Absolute 0 68 Thousand/uL      Total Counted 100 RBC Morphology abnormal     Macrocytes Present     Platelet Estimate Borderline    Troponin I [[id number]]  (Normal) Collected: 08/05/21 0934    Lab Status: Final result Specimen: Blood from Arm, Right Updated: 08/05/21 1041     Troponin I 0 02 ng/mL     Narrative:      Gross Hemolysis    Comprehensive metabolic panel [[id number]]  (Abnormal) Collected: 08/05/21 0934    Lab Status: Final result Specimen: Blood from Arm, Right Updated: 08/05/21 1029     Sodium 142 mmol/L      Potassium 4 6 mmol/L      Chloride 94 mmol/L      CO2 40 mmol/L      ANION GAP 8 mmol/L      BUN 23 mg/dL      Creatinine 0 92 mg/dL      Glucose 133 mg/dL      Calcium 9 2 mg/dL      AST 42 U/L      ALT 19 U/L      Alkaline Phosphatase 59 U/L      Total Protein 7 9 g/dL      Albumin 4 0 g/dL      Total Bilirubin 1 06 mg/dL      eGFR 77 ml/min/1 73sq m     Narrative:      Hemolysis  National Kidney [employer] Foundation guidelines for Chronic Kidney Disease (CKD):     Stage 1 with normal or high GFR (GFR > 90 mL/min/1 73 square meters)    Stage 2 Mild CKD (GFR = 60-89 mL/min/1 73 square meters)    Stage 3A Moderate CKD (GFR = 45-59 mL/min/1 73 square meters)    Stage 3B Moderate CKD (GFR = 30-44 mL/min/1 73 square meters)    Stage 4 Severe CKD (GFR = 15-29 mL/min/1 73 square meters)    Stage 5 End Stage CKD (GFR <15 mL/min/1 73 square meters)  Note: GFR calculation is accurate only with a steady state creatinine    Protime-INR [[id number]]  (Normal) Collected: 08/05/21 0934    Lab Status: Final result Specimen: Blood from Arm, Right Updated: 08/05/21 1026     Protime 13 6 seconds      INR 1 03    APTT [[id number]]  (Normal) Collected: 08/05/21 0934    Lab Status: Final result Specimen: Blood from Arm, Right Updated: 08/05/21 1026     PTT 31 seconds     CBC and differential [[id number]]  (Abnormal) Collected: 08/05/21 0934    Lab Status: Final result Specimen: Blood from Arm, Right Updated: 08/05/21 1015     WBC 6 80 Thousand/uL      RBC 4 75 Million/uL      Hemoglobin 15 3 g/dL      Hematocrit 47 6 %       fL      MCH 32 3 pg      MCHC 32 2 g/dL      RDW 14 8 %      MPV 7 6 fL      Platelets 195 Thousands/uL                  CT abdomen pelvis w contrast   Final Result by Rosey Chris MD (08/05 1214)      1  Proctitis  2  A 4 6 cm subcutaneous rim-enhancing hypodense structure extending from the right lower rectus abdominis muscle to the anterior aspect of a right inguinal hernia sac  Findings may represent a fluid collection or may be related to reported hernia mesh    migration  Correlate with surgical history and physical exam       3  Small right inguinal hernia containing fat and nonobstructed loop of small bowel  Workstation performed: [id number]                    Procedures  Procedures         ED Course  ED Course as of Aug 05 1720   Thu Aug 05, 2021   1220    1  Proctitis      2  A 4 6 cm subcutaneous rim-enhancing hypodense structure extending from the right lower rectus abdominis muscle to the anterior aspect of a right inguinal hernia sac  Findings may represent a fluid collection or may be related to reported hernia mesh   migration    Correlate with surgical history and physical exam      3  Small right inguinal hernia containing fat and nonobstructed loop of small bowel          1221 H / H 15 3 and 47 6                     Stroke Assessment     Row Name 08/05/21 0947             NIH Stroke Scale    Interval  --      Level of Consciousness (1a )  --      LOC Questions (1b )  --      LOC Commands (1c )  --      Best Gaze (2 )  --      Visual (3 )  --      Facial Palsy (4 )  --      Motor Arm, Left (5a )  --      Motor Arm, Right (5b )  --      Motor Leg, Left (6a )  --      Motor Leg, Right (6b )  --      Limb Ataxia (7 )  --      Sensory (8 )  --      Best Language (9 )  --      Dysarthria (10 )  --      Extinction and Inattention (11 ) (Formerly Neglect)  --      Total  --                    SBIRT 20yo+      Most Recent Value   SBIRT (24 yo +)   In order to provide better care to our patients, we are screening all of our patients for alcohol and drug use  Would it be okay to ask you these screening questions? Yes Filed at: 08/05/2021 0935   Initial Alcohol Screen: US AUDIT-C    1  How often do you have a drink containing alcohol?  0 Filed at: 08/05/2021 0935   2  How many drinks containing alcohol do you have on a typical day you are drinking? 0 Filed at: 08/05/2021 0935   3b  FEMALE Any Age, or MALE 65+: How often do you have 4 or more drinks on one occassion? 0 Filed at: 08/05/2021 0935   Audit-C Score  0 Filed at: 08/05/2021 1752   ALAN: How many times in the past year have you    Used an illegal drug or used a prescription medication for non-medical reasons? Never Filed at: 08/05/2021 0935                    MDM  Number of Diagnoses or Management Options  Inguinal hernia  Proctitis  Rectal bleeding  Diagnosis management comments: All imaging and/or lab testing discussed with patient  Patient and/or family members verbalizes understanding and agrees with plan for admission  Patient is stable for admission      Portions of the record may have been created with voice recognition software  Occasional wrong word or "sound a like" substitutions may have occurred due to the inherent limitations of voice recognition software  Read the chart carefully and recognize, using context, where substitutions have occurred               Amount and/or Complexity of Data Reviewed  Clinical lab tests: ordered and reviewed  Tests in the radiology section of CPT®: ordered and reviewed  Decide to obtain previous medical records or to obtain history from someone other than the patient: yes    Risk of Complications, Morbidity, and/or Mortality  Presenting problems: high  Diagnostic procedures: high  Management options: high    Patient Progress  Patient progress: stable      Disposition  Final diagnoses:   Rectal bleeding   Proctitis Inguinal hernia     Time reflects when diagnosis was documented in both MDM as applicable and the Disposition within this note     Time User Action Codes Description Comment    8/5/2021  1:03 PM Shaun Randal Add [K62 5] Rectal bleeding     8/5/2021  1:04 PM Ina Salmons [K62 89] Proctitis     8/5/2021  1:04 PM Shaun Randal Add [K40 90] Inguinal hernia       ED Disposition     ED Disposition Condition Date/Time Comment    Transfer to Another Facility-In Network  Thu Aug 5, 2021  8:94 PM Peggy Bell should be transferred out to [facility] [place]      MD Documentation      Most Recent Value   Patient Condition  The patient has been stabilized such that within reasonable medical probability, no material deterioration of the patient condition or the condition of the unborn child(joselito) is likely to result from the transfer   Reason for Transfer  Level of Care needed not available at this facility   Benefits of Transfer  Specialized equipment and/or services available at the receiving facility (Include comment)________________________   Risks of Transfer  Potential for delay in receiving treatment   Accepting Physician  Dr Jane Dela Cruz Name, [facility]    (Name & Tel number)  Ranjit Chang PACS   Sending MD  Dr Payton Lei   Provider Certification  General risk, such as traffic hazards, adverse weather conditions, rough terrain or turbulence, possible failure of equipment (including vehicle or aircraft), or consequences of actions of persons outside the control of the transport personnel      RN Documentation      [facility] Name, [address] Assignment  [address]    (Name & Tel number)  Ranjit Chang PACS   Report Given to  [address] [place] [address]  Advanced life support      Follow-up Information    None         Discharge Medication List as of 8/5/2021  5:09 PM      CONTINUE these medications which have NOT CHANGED    Details   acetaminophen (TYLENOL) 325 mg tablet Take 2 tablets (650 mg total) by mouth every 6 (six) hours as needed for fever (temperature greater than 101 F), Starting Mon 12/31/2018, Print      albuterol (2 5 mg/3 mL) 0 083 % nebulizer solution Take 1 vial (2 5 mg total) by nebulization every 6 (six) hours as needed for wheezing or shortness of breath, Starting Tue 6/26/2018, Normal      albuterol (VENTOLIN HFA) 90 mcg/act inhaler Inhale 2 puffs, Starting Tue 8/8/2017, Historical Med      amLODIPine (NORVASC) 10 mg tablet Take 1 tablet (10 mg total) by mouth daily, Starting Tue 6/16/2020, Normal      ASPIRIN 81 81 MG EC tablet TAKE ONE TABLET BY MOUTH ONCE DAILY, Normal      atorvastatin (LIPITOR) 10 mg tablet Take 1 tablet (10 mg total) by mouth daily, Starting Tue 6/16/2020, Normal      budesonide-formoterol (SYMBICORT) 160-4 5 mcg/act inhaler Inhale 2 puffs 2 (two) times a day Rinse mouth after use , Starting Tue 6/16/2020, Normal      hydrochlorothiazide (HYDRODIURIL) 12 5 mg tablet Take 1 tablet (12 5 mg total) by mouth daily after breakfast, Starting Tue 6/16/2020, Normal      losartan (COZAAR) 100 MG tablet Take 1 tablet (100 mg total) by mouth daily after dinner, Starting Tue 6/16/2020, Normal      melatonin 3 mg Take 2 tablets (6 mg total) by mouth daily at bedtime, Starting Tue 6/16/2020, Normal      metoprolol tartrate (LOPRESSOR) 50 mg tablet Take 1 tablet (50 mg total) by mouth every 12 (twelve) hours, Starting Tue 6/16/2020, Normal      pantoprazole (PROTONIX) 40 mg tablet Take 1 tablet (40 mg total) by mouth daily, Starting Tue 6/16/2020, Normal      tiotropium (SPIRIVA) 18 mcg inhalation capsule Place 1 capsule (18 mcg total) into inhaler and inhale daily, Starting Tue 6/16/2020, Normal           No discharge procedures on file      PDMP Review     None          ED Provider  Electronically Signed by           Zita Munoz PA-C  08/05/21 6150

## 2021-08-06 PROBLEM — K62.89 PROCTITIS: Status: ACTIVE | Noted: 2021-08-06

## 2021-08-06 LAB
ABO GROUP BLD: NORMAL
ANION GAP SERPL CALCULATED.3IONS-SCNC: 0 MMOL/L (ref 4–13)
BASOPHILS # BLD AUTO: 0.05 THOUSANDS/ΜL (ref 0–0.1)
BASOPHILS NFR BLD AUTO: 1 % (ref 0–1)
BLD GP AB SCN SERPL QL: NEGATIVE
BUN SERPL-MCNC: 17 MG/DL (ref 5–25)
C DIFF TOX A+B STL QL IA: NEGATIVE
C DIFF TOX B TCDB STL QL NAA+PROBE: POSITIVE
CALCIUM SERPL-MCNC: 8.5 MG/DL (ref 8.3–10.1)
CHLORIDE SERPL-SCNC: 103 MMOL/L (ref 100–108)
CO2 SERPL-SCNC: 42 MMOL/L (ref 21–32)
CREAT SERPL-MCNC: 0.98 MG/DL (ref 0.6–1.3)
EOSINOPHIL # BLD AUTO: 0.91 THOUSAND/ΜL (ref 0–0.61)
EOSINOPHIL NFR BLD AUTO: 13 % (ref 0–6)
ERYTHROCYTE [DISTWIDTH] IN BLOOD BY AUTOMATED COUNT: 13.2 % (ref 11.6–15.1)
GFR SERPL CREATININE-BSD FRML MDRD: 71 ML/MIN/1.73SQ M
GLUCOSE SERPL-MCNC: 93 MG/DL (ref 65–140)
HCT VFR BLD AUTO: 44.8 % (ref 36.5–49.3)
HGB BLD-MCNC: 12.5 G/DL (ref 12–17)
HGB BLD-MCNC: 12.9 G/DL (ref 12–17)
HGB BLD-MCNC: 13.3 G/DL (ref 12–17)
IMM GRANULOCYTES # BLD AUTO: 0.01 THOUSAND/UL (ref 0–0.2)
IMM GRANULOCYTES NFR BLD AUTO: 0 % (ref 0–2)
LYMPHOCYTES # BLD AUTO: 1.46 THOUSANDS/ΜL (ref 0.6–4.47)
LYMPHOCYTES NFR BLD AUTO: 21 % (ref 14–44)
MCH RBC QN AUTO: 31 PG (ref 26.8–34.3)
MCHC RBC AUTO-ENTMCNC: 28.8 G/DL (ref 31.4–37.4)
MCV RBC AUTO: 108 FL (ref 82–98)
MONOCYTES # BLD AUTO: 0.77 THOUSAND/ΜL (ref 0.17–1.22)
MONOCYTES NFR BLD AUTO: 11 % (ref 4–12)
NEUTROPHILS # BLD AUTO: 3.65 THOUSANDS/ΜL (ref 1.85–7.62)
NEUTS SEG NFR BLD AUTO: 54 % (ref 43–75)
NRBC BLD AUTO-RTO: 0 /100 WBCS
PLATELET # BLD AUTO: 116 THOUSANDS/UL (ref 149–390)
PMV BLD AUTO: 8.8 FL (ref 8.9–12.7)
POTASSIUM SERPL-SCNC: 3.8 MMOL/L (ref 3.5–5.3)
RBC # BLD AUTO: 4.16 MILLION/UL (ref 3.88–5.62)
RH BLD: POSITIVE
SODIUM SERPL-SCNC: 145 MMOL/L (ref 136–145)
SPECIMEN EXPIRATION DATE: NORMAL
WBC # BLD AUTO: 6.85 THOUSAND/UL (ref 4.31–10.16)

## 2021-08-06 PROCEDURE — 87209 SMEAR COMPLEX STAIN: CPT | Performed by: PHYSICIAN ASSISTANT

## 2021-08-06 PROCEDURE — 86901 BLOOD TYPING SEROLOGIC RH(D): CPT | Performed by: INTERNAL MEDICINE

## 2021-08-06 PROCEDURE — 85018 HEMOGLOBIN: CPT | Performed by: NURSE PRACTITIONER

## 2021-08-06 PROCEDURE — 85018 HEMOGLOBIN: CPT | Performed by: INTERNAL MEDICINE

## 2021-08-06 PROCEDURE — 87177 OVA AND PARASITES SMEARS: CPT | Performed by: PHYSICIAN ASSISTANT

## 2021-08-06 PROCEDURE — 86900 BLOOD TYPING SEROLOGIC ABO: CPT | Performed by: INTERNAL MEDICINE

## 2021-08-06 PROCEDURE — 83993 ASSAY FOR CALPROTECTIN FECAL: CPT | Performed by: PHYSICIAN ASSISTANT

## 2021-08-06 PROCEDURE — 99232 SBSQ HOSP IP/OBS MODERATE 35: CPT | Performed by: INTERNAL MEDICINE

## 2021-08-06 PROCEDURE — 87329 GIARDIA AG IA: CPT | Performed by: PHYSICIAN ASSISTANT

## 2021-08-06 PROCEDURE — 86850 RBC ANTIBODY SCREEN: CPT | Performed by: INTERNAL MEDICINE

## 2021-08-06 PROCEDURE — 80048 BASIC METABOLIC PNL TOTAL CA: CPT

## 2021-08-06 PROCEDURE — 87493 C DIFF AMPLIFIED PROBE: CPT | Performed by: PHYSICIAN ASSISTANT

## 2021-08-06 PROCEDURE — 87505 NFCT AGENT DETECTION GI: CPT | Performed by: PHYSICIAN ASSISTANT

## 2021-08-06 PROCEDURE — 85025 COMPLETE CBC W/AUTO DIFF WBC: CPT

## 2021-08-06 PROCEDURE — 99223 1ST HOSP IP/OBS HIGH 75: CPT | Performed by: INTERNAL MEDICINE

## 2021-08-06 RX ORDER — PANTOPRAZOLE SODIUM 40 MG/1
40 TABLET, DELAYED RELEASE ORAL
Status: DISCONTINUED | OUTPATIENT
Start: 2021-08-06 | End: 2021-08-10 | Stop reason: HOSPADM

## 2021-08-06 RX ADMIN — Medication 125 MG: at 17:45

## 2021-08-06 RX ADMIN — METOPROLOL TARTRATE 50 MG: 50 TABLET, FILM COATED ORAL at 09:52

## 2021-08-06 RX ADMIN — TIOTROPIUM BROMIDE 18 MCG: 18 CAPSULE ORAL; RESPIRATORY (INHALATION) at 09:52

## 2021-08-06 RX ADMIN — PANTOPRAZOLE SODIUM 40 MG: 40 TABLET, DELAYED RELEASE ORAL at 09:52

## 2021-08-06 RX ADMIN — BUDESONIDE AND FORMOTEROL FUMARATE DIHYDRATE 2 PUFF: 160; 4.5 AEROSOL RESPIRATORY (INHALATION) at 09:52

## 2021-08-06 RX ADMIN — LOSARTAN POTASSIUM 100 MG: 50 TABLET, FILM COATED ORAL at 17:45

## 2021-08-06 RX ADMIN — ATORVASTATIN CALCIUM 10 MG: 10 TABLET, FILM COATED ORAL at 17:45

## 2021-08-06 RX ADMIN — AMLODIPINE BESYLATE 10 MG: 10 TABLET ORAL at 09:52

## 2021-08-06 NOTE — ASSESSMENT & PLAN NOTE
· Platelet count 175 on presentation, patient has chronic mild thrombocytopenia currently around baseline  · Slightly below baseline    · Continue monitor CBC

## 2021-08-06 NOTE — ASSESSMENT & PLAN NOTE
· Platelet count 025 on presentation, patient has chronic mild thrombocytopenia currently around baseline    · Slightly below baseline at 116 today  · Continue to monitor CBC

## 2021-08-06 NOTE — PROGRESS NOTES
08/06/21 1200   Clinical Encounter Type   Visited With Health care provider   Routine Visit Follow-up

## 2021-08-06 NOTE — PROGRESS NOTES
[address] [facility]  Progress Note Maria Teresa Doe [id number], 82 y o  male MRN: [id number]  Unit/Bed#: ICU 07 Encounter: [id number]  Primary Care Provider: Jin Jernigan MD   Date and time admitted to hospital: 8/5/2021  5:26 PM    * Rectal bleeding  Assessment & Plan  · POA:  Bloody bowel movement, no associated dizziness, lightheadedness, nausea, vomiting, diarrhea or abdominal pain  · Rectal bleeding likely secondary to diverticular bleed, proctitis, colon cancer cannot be ruled out  · Patient currently is hemodynamically stable  · Hemoglobin trending down but still in normal range  Recent Labs     08/05/21  1233 08/05/21 2010 08/06/21  0442   HGB 13 8 14 2 12 9     · Digital exam did reveal external hemorrhoids  · CT abdomen/pelvis showed proctitis, 4 6 cm subcutaneous enhancing rim which could be representing abdominal mesh given the patient history of hernia repair  · Previous CT 5/29/20 showed severe diverticular disease throughout the large intestine  · Patient denied previous endoscopy or colonoscopy  · Patient further denied change of bowel habits, weight loss, family history of colon cancer  · Monitor H&H and hemodynamics  Transfuse if hemoglobin less than 7  · GI is consulted  Recommendation is appreciated  ·     COPD (chronic obstructive pulmonary disease) (HCC)  Assessment & Plan  · On chronic supplemental oxygen at 2 L at home, currently saturating mid 90s on 2 L  · There is no signs or symptoms of exacerbation on exam  · Continue home regimen with Symbicort, Spiriva and albuterol  · Continue to monitor respiratory status  · Respiratory protocol    Proctitis  Assessment & Plan  As evidenced by CT of the abdomen, showed wall thickening of the rectum, likely secondary to proctitis  Patient did not complain of abdominal pain, nausea, or vomiting  No leukocytosis, and he is afebrile  Will monitor off antibiotics at the time    Consider cipro if develop leukocytosis or fever  Monitor CBC and fever curve    Hyperlipidemia  Assessment & Plan  · Continue statin    Thrombocytopenia (HCC)  Assessment & Plan  · Platelet count 613 on presentation, patient has chronic mild thrombocytopenia currently around baseline  · Slightly below baseline  · Continue monitor CBC    Benign essential hypertension  Assessment & Plan  · Blood pressure is elevated on presentation 180s/100s   · Continue home regimen with Lopressor 50 mg daily, losartan 100 mg daily Norvasc 10 mg daily  · Continue hydralazine p r n  For SBP more than 180  · Monitor blood pressure    ----------------------------------------------------------------------------------------  HPI/24hr events:  5 bloody bowel movements noted overnight    Patient does not meet criteria for ICU Follow-up Clinic; referral has not been made  Disposition: Transfer to Med-Surg   Code Status: Level 1 - Full Code  ---------------------------------------------------------------------------------------  SUBJECTIVE  Patient denied abdominal pain, nausea, vomiting, intermittent rectal bleeding between bowel movements, change of bowel habits, the weight loss, family history colon cancer  Review of Systems   Constitutional: Negative for chills and fever  HENT: Negative for ear pain and sore throat  Eyes: Negative for pain and visual disturbance  Respiratory: Negative for cough and shortness of breath  Cardiovascular: Negative for chest pain and palpitations  Gastrointestinal: Positive for anal bleeding  Negative for abdominal pain and vomiting  Genitourinary: Negative for dysuria and hematuria  Musculoskeletal: Negative for arthralgias and back pain  Skin: Negative for color change and rash  Neurological: Negative for dizziness, seizures, syncope and light-headedness  All other systems reviewed and are negative      Review of systems was reviewed and negative unless stated above in HPI/24-hour events ---------------------------------------------------------------------------------------  OBJECTIVE    Vitals   Vitals:    21 0711 21 0800 21 0900 21 1000   BP:  121/63 107/62 120/62   BP Location:       Pulse:  (!) 54 56 58   Resp:  22 (!) 28 20   Temp: 98 °F (36 7 °C)      TempSrc: Temporal      SpO2:  95% 94% 95%   Weight:       Height:         Temp (24hrs), Av 4 °F (36 3 °C), Min:96 8 °F (36 °C), Max:98 °F (36 7 °C)  Current: Temperature: 98 °F (36 7 °C)          Respiratory:  SpO2: SpO2: 95 %, SpO2 Activity: SpO2 Activity: At Rest, SpO2 Device: O2 Device: Nasal cannula, Capnography:    Nasal Cannula O2 Flow Rate (L/min): 4 L/min    Invasive/non-invasive ventilation settings   Respiratory    Lab Data (Last 4 hours)    None         O2/Vent Data (Last 4 hours)    None                Physical Exam  Constitutional:       General: He is not in acute distress  Appearance: Normal appearance  He is not ill-appearing  HENT:      Head: Normocephalic and atraumatic  Mouth/Throat:      Mouth: Mucous membranes are moist       Pharynx: No oropharyngeal exudate or posterior oropharyngeal erythema  Eyes:      Extraocular Movements: Extraocular movements intact  Pupils: Pupils are equal, round, and reactive to light  Cardiovascular:      Rate and Rhythm: Normal rate  Pulses: Normal pulses  Heart sounds: Normal heart sounds  No murmur heard  Pulmonary:      Effort: Pulmonary effort is normal  No respiratory distress  Breath sounds: Normal breath sounds  No wheezing  Abdominal:      General: There is no distension  Palpations: Abdomen is soft  Tenderness: There is no abdominal tenderness  There is no guarding  Comments: External hemorrhoids noted, dried blood in the rectal vault and around the anus   Musculoskeletal:         General: No swelling or tenderness  Skin:     General: Skin is dry  Coloration: Skin is not jaundiced or pale  Neurological:      General: No focal deficit present  Mental Status: He is alert and oriented to person, place, and time  Psychiatric:         Mood and Affect: Mood normal          Behavior: Behavior normal          Laboratory and Diagnostics:  Results from last 7 days   Lab Units 08/06/21  0810 08/06/21  0442 08/05/21 2010 08/05/21  1233 08/05/21  0934   WBC Thousand/uL  --  6 85  --  6 10 6 80   HEMOGLOBIN g/dL 13 3 12 9 14 2 13 8 15 3   HEMATOCRIT %  --  44 8  --  43 9 47 6   PLATELETS Thousands/uL  --  116*  --  132* 144*   NEUTROS PCT %  --  54  --   --   --    BANDS PCT %  --   --   --   --  6   MONOS PCT %  --  11  --   --   --    MONO PCT %  --   --   --   --  12*     Results from last 7 days   Lab Units 08/06/21  0442 08/05/21  1555 08/05/21  0934   SODIUM mmol/L 145 144 142   POTASSIUM mmol/L 3 8 3 2* 4 6   CHLORIDE mmol/L 103 101 94*   CO2 mmol/L 42* 39* 40*   ANION GAP mmol/L 0* 4* 8   BUN mg/dL 17 18 23   CREATININE mg/dL 0 98 0 74 0 92   CALCIUM mg/dL 8 5 7 1* 9 2   GLUCOSE RANDOM mg/dL 93 81 133*   ALT U/L  --  13 19   AST U/L  --  26 42   ALK PHOS U/L  --  45 59   ALBUMIN g/dL  --  2 8* 4 0   TOTAL BILIRUBIN mg/dL  --  0 73 1 06          Results from last 7 days   Lab Units 08/05/21  0934   INR  1 03   PTT seconds 31      Results from last 7 days   Lab Units 08/05/21  0934   TROPONIN I ng/mL 0 02         ABG:    VBG:          Micro        EKG:  Sinus bradycardia  Imaging: I have personally reviewed pertinent films in PACS    Intake and Output  I/O       08/04 0701 - 08/05 0700 08/05 0701 - 08/06 0700 08/06 0701 - 08/07 0700    Urine (mL/kg/hr)  450     Stool  725     Total Output  1175     Net  -1175            Unmeasured Stool Occurrence  1 x           Height and Weights   Height: 5' 1" (154 9 cm)  IBW (Ideal Body Weight): 52 3 kg  Body mass index is 26 87 kg/m²    Weight (last 2 days)     Date/Time   Weight    08/06/21 0600   64 5 (142 2)    08/05/21 1733   64 5 (142 2) Nutrition       Diet Orders   (From admission, onward)             Start     Ordered    08/06/21 0819  Diet Clear Liquid  Diet effective now     Question Answer Comment   Diet Type Clear Liquid    RD to adjust diet per protocol?  Yes        08/06/21 0818                  Active Medications  Scheduled Meds:  Current Facility-Administered Medications   Medication Dose Route Frequency Provider Last Rate    acetaminophen  650 mg Oral Q6H PRN Justine Monterroso MD      albuterol  2 5 mg Nebulization Q6H PRN Justine Monterroso MD      amLODIPine  10 mg Oral Daily Justine oMnterroso MD      atorvastatin  10 mg Oral Daily With Renuka Weir MD      budesonide-formoterol  2 puff Inhalation BID Justine Monterroso MD      hydrALAZINE  5 mg Intravenous Q6H PRN Justine Monterroso MD      losartan  100 mg Oral After Renuka Weir MD      metoprolol tartrate  50 mg Oral Q12H Wei Salas MD      pantoprazole  40 mg Oral Early Morning Keith Dunne MD      sodium chloride  75 mL/hr Intravenous Continuous Justine Monterroso MD 75 mL/hr (08/05/21 1900)    tiotropium  18 mcg Inhalation Daily Justine Monterroso MD       Continuous Infusions:  sodium chloride, 75 mL/hr, Last Rate: 75 mL/hr (08/05/21 1900)      PRN Meds:   acetaminophen, 650 mg, Q6H PRN  albuterol, 2 5 mg, Q6H PRN  hydrALAZINE, 5 mg, Q6H PRN        Invasive Devices Review  Invasive Devices     Peripheral Intravenous Line            Peripheral IV 08/05/21 Right Antecubital 1 day    Peripheral IV 08/06/21 Left Antecubital <1 day                Rationale for remaining devices: no devices presently  ---------------------------------------------------------------------------------------  Advance Directive and Living Will:      Power of :    POLST:    ---------------------------------------------------------------------------------------  Care Time Delivered:   No Critical Care time spent       Catie eFlipe MD      Portions of the record may have been created with voice recognition software  Occasional wrong word or "sound a like" substitutions may have occurred due to the inherent limitations of voice recognition software    Read the chart carefully and recognize, using context, where substitutions have occurred

## 2021-08-06 NOTE — ASSESSMENT & PLAN NOTE
· On chronic supplemental oxygen at 2 L at home, currently saturating mid 90s on 2 L  · There is no signs or symptoms of exacerbation on exam  · Continue home regimen with Symbicort, Spiriva and albuterol  · Continue to monitor respiratory status    · Respiratory protocol

## 2021-08-06 NOTE — ASSESSMENT & PLAN NOTE
As evidenced by CT of the abdomen, showed wall thickening of the rectum, likely secondary to proctitis  Patient did not complain of abdominal pain, nausea, or vomiting  No leukocytosis, and he is afebrile  Will monitor off antibiotics at the time    Consider cipro if develop leukocytosis or fever  Monitor CBC and fever curve

## 2021-08-06 NOTE — UTILIZATION REVIEW
Initial Clinical Review    TRANSFER FROM [facility]    Admission: Date/Time/Statement:   Admission Orders (From admission, onward)     Ordered        08/05/21 1750  Inpatient Admission  Once         08/05/21 1750  Inpatient Admission  Once                   08/05/21 1735  Inpatient Admission (Inpatient Admission) Once     Transfer Service: Critical Care/ICU       Question Answer Comment   Level of Care Level 1 Stepdown    Estimated length of stay More than 2 Midnights    Certification I certify that inpatient services are medically necessary for this patient for a duration of greater than two midnights  See H&P and MD Progress Notes for additional information about the patient's course of treatment  08/05/21 1750         Initial Presentation: 82  Y O male  Initially presented to ED at  [facility] from home with bloody bowel movements since the am of admission  PMH  Is  HTN,  DVT, CAD,  COPD on  Chronic O2  Ct  Abdomen shows proctitis  Initial hemoglobin  15 3, repeat   13 8  Transferred to [facility] for further care  Admit  IP  Stepdown Level  1 with   Rectal bleeding and thrombocytopenia and plan is  Monitor labs, GI consult,   Cl liq diet and continue home meds  Date:     8/6       Day 2:   GI consult  Had  5  Episodes  of  BRBPR during the night  Would  Benefit f rom endoscopic  Eval  Plan  Early next week if stable  Progress note  ( 8/6)    Hemoglobin trending down, still in normal range  Rectal bleeding likely  Due to diverticular bleed, proctitis, cannot r/o  Colon cancer  Continue  O2  2L  Continue current meds  Possible transfer to med surg unit        Wt Readings from Last 1 Encounters:   08/06/21 64 5 kg (142 lb 3 2 oz)     Additional Vital Signs:   0800  --  54Abnormal   22  121/63  81  95 %  --  --  --   08/06/21 0711  98 °F (36 7 °C)  --  --  --  --  --  --  --  --   08/06/21 0500  --  52Abnormal   23Abnormal   139/71  102  96 %  --  --  --   08/06/21 0400  --  62  29Abnormal   138/77  101  98 %  --  --  --   08/06/21 0335  97 1 °F (36 2 °C)Abnormal   --  --  --  --  --  --  --  --   08/06/21 0300  --  50Abnormal   23Abnormal   143/72  95  98 %  36  4 L/min  Nasal cannula   08/06/21 0200  --  50Abnormal   14  156/74  102  96 %  --  --  --   08/06/21 0100  --  46Abnormal   30Abnormal   140/69  102  97 %  --  --  --   08/06/21 0000  --  46Abnormal   24Abnormal   148/69  107  96 %  --  --  --   08/05/21 2304  97 5 °F (36 4 °C)  --  --  --  --  --  --  --  --   08/05/21 2300  --  50Abnormal   28Abnormal   155/84  108  99 %  36  4 L/min  Nasal cannula   08/05/21 2200  --  48Abnormal   21  129/64  92  91 %  36  4 L/min  --   08/05/21 2100  --  60  27Abnormal   159/77  119  94 %  --  --  --   08/05/21 2000  --  56  27Abnormal   151/74  96  97 %  28  2 L/min  Nasal cannula   08/05/21 1930  96 8 °F (36 °C)Abnormal   --  --  --  --  --  --  --  --   08/05/21 1905  --  --  --  --  --  --  28  2 L/min  Nasal cannula   08/05/21 1900  --  56  27Abnormal   158/71  98  98 %  28  2 L/min  Nasal cannula   08/05/21 1800  --  54Abnormal   31Abnormal   142/71  109  97 %  --  --  --   08/05/21 1733  97 8 °F (36 6 °C)  66  32Abnormal   178/82Abnormal   119  98 %  28  2 L/min  Nasal cannula         Pertinent Labs/Diagnostic Test Results:   Ct  Abd/pelvis  ( 8/5)     Proctitis  2  A 4 6 cm subcutaneous rim-enhancing hypodense structure extending from the right lower rectus abdominis muscle to the anterior aspect of a right inguinal hernia sac   Findings may represent a fluid collection or may be related to reported hernia mesh   migration   Correlate with surgical history and physical exam      3  Small right inguinal hernia containing fat and nonobstructed loop of small bowel         Results from last 7 days   Lab Units 08/06/21  0810 08/06/21  0442 08/05/21 2010 08/05/21  1233 08/05/21  0934   WBC Thousand/uL  --  6 85  --  6 10 6 80   HEMOGLOBIN g/dL 13 3 12 9 14 2 13 8 15 3 HEMATOCRIT %  --  44 8  --  43 9 47 6   PLATELETS Thousands/uL  --  116*  --  132* 144*   NEUTROS ABS Thousands/µL  --  3 65  --   --   --    TOTAL NEUT ABS Thousand/uL  --   --   --   --  4 08   BANDS PCT %  --   --   --   --  6         Results from last 7 days   Lab Units 08/06/21  0442 08/05/21  1555 08/05/21  0934   SODIUM mmol/L 145 144 142   POTASSIUM mmol/L 3 8 3 2* 4 6   CHLORIDE mmol/L 103 101 94*   CO2 mmol/L 42* 39* 40*   ANION GAP mmol/L 0* 4* 8   BUN mg/dL 17 18 23   CREATININE mg/dL 0 98 0 74 0 92   EGFR ml/min/1 73sq m 71 85 77   CALCIUM mg/dL 8 5 7 1* 9 2     Results from last 7 days   Lab Units 08/05/21  1555 08/05/21  0934   AST U/L 26 42   ALT U/L 13 19   ALK PHOS U/L 45 59   TOTAL PROTEIN g/dL 5 6* 7 9   ALBUMIN g/dL 2 8* 4 0   TOTAL BILIRUBIN mg/dL 0 73 1 06         Results from last 7 days   Lab Units 08/06/21  0442 08/05/21  1555 08/05/21  0934   GLUCOSE RANDOM mg/dL 93 81 133*               Results from last 7 days   Lab Units 08/05/21  0934   TROPONIN I ng/mL 0 02         Results from last 7 days   Lab Units 08/05/21  0934   PROTIME seconds 13 6   INR  1 03   PTT seconds 31                 Present on Admission:   Thrombocytopenia ([address] )   COPD (chronic obstructive pulmonary disease) (HCC)   Benign essential hypertension      Admitting Diagnosis: Rectal bleeding [K62 5]  Age/Sex: 82 y o  male  Admission Orders:  Scheduled Medications:  amLODIPine, 10 mg, Oral, Daily  atorvastatin, 10 mg, Oral, Daily With Dinner  budesonide-formoterol, 2 puff, Inhalation, BID  losartan, 100 mg, Oral, After Dinner  metoprolol tartrate, 50 mg, Oral, Q12H [address]  pantoprazole, 40 mg, Oral, Early Morning  tiotropium, 18 mcg, Inhalation, Daily      Continuous IV Infusions:  sodium chloride, 75 mL/hr, Intravenous, Continuous      PRN Meds:  acetaminophen, 650 mg, Oral, Q6H PRN  albuterol, 2 5 mg, Nebulization, Q6H PRN  hydrALAZINE, 5 mg, Intravenous, Q6H PRN        IP CONSULT TO CASE MANAGEMENT  IP CONSULT TO GASTROENTEROLOGY     Neuro checks  Q 4 hrs  Dysphagia eval  Fall precautions    Network Utilization Review Department  ATTENTION: Please call with any questions or concerns to [phone] and carefully listen to the prompts so that you are directed to the right person  All voicemails are confidential   [place] Patient all requests for admission clinical reviews, approved or denied determinations and any other requests to dedicated fax number below belonging to the campus where the patient is receiving treatment   List of dedicated fax numbers for the Facilities:  [address] DENIALS (Administrative/Medical Necessity) [phone]   [address] (Maternity/NICU/Pediatrics) [phone]   [address] [place] [address] [address] [place] [address] [address] [place] [address] [address] [facility] [place] [address] [phone]

## 2021-08-06 NOTE — ASSESSMENT & PLAN NOTE
· POA:  Bloody bowel movement, no associated dizziness, lightheadedness, nausea, vomiting, diarrhea or abdominal pain  · Rectal bleeding likely secondary to diverticular bleed, proctitis, colon cancer cannot be ruled out  · Patient currently is hemodynamically stable  · Hemoglobin trending down but still in normal range  Recent Labs     08/05/21  1233 08/05/21 2010 08/06/21  0442   HGB 13 8 14 2 12 9   ·   · Digital exam did reveal external hemorrhoids  · CT abdomen/pelvis showed proctitis, 4 6 cm subcutaneous enhancing rim which could be representing abdominal mesh given the patient history of hernia repair  · Previous CT 5/29/20 showed severe diverticular disease throughout the large intestine  · Patient denied previous endoscopy or colonoscopy  · Patient further denied change of bowel habits, weight loss, family history of colon cancer  · Monitor H&H and hemodynamics  Transfuse if hemoglobin less than 7  · GI is consulted  Recommendation is appreciated  Colonoscopy might be warranted    ·

## 2021-08-06 NOTE — PLAN OF CARE
Problem: MOBILITY - ADULT  Goal: Maintain or return to baseline ADL function  Description: INTERVENTIONS:  -  Assess patient's ability to carry out ADLs; assess patient's baseline for ADL function and identify physical deficits which impact ability to perform ADLs (bathing, care of mouth/teeth, toileting, grooming, dressing, etc )  - Assess/evaluate cause of self-care deficits   - Assess range of motion  - Assess patient's mobility; develop plan if impaired  - Assess patient's need for assistive devices and provide as appropriate  - Encourage maximum independence but intervene and supervise when necessary  - Involve family in performance of ADLs  - Assess for home care needs following discharge   - Consider OT consult to assist with ADL evaluation and planning for discharge  - Provide patient education as appropriate  Outcome: Progressing  Goal: Maintains/Returns to pre admission functional level  Description: INTERVENTIONS:  - Perform BMAT or MOVE assessment daily    - Set and communicate daily mobility goal to care team and patient/family/caregiver     - Collaborate with rehabilitation services on mobility goals if consulted  - Out of bed for toileting  - Record patient progress and toleration of activity level   Outcome: Progressing     Problem: Potential for Falls  Goal: Patient will remain free of falls  Description: INTERVENTIONS:  - Educate patient/family on patient safety including physical limitations  - Instruct patient to call for assistance with activity   - Consult OT/PT to assist with strengthening/mobility   - Keep Call bell within reach  - Keep bed low and locked with side rails adjusted as appropriate  - Keep care items and personal belongings within reach  - Initiate and maintain comfort rounds  - Make Fall Risk Sign visible to staff  - Apply yellow socks and bracelet for high fall risk patients  - Consider moving patient to room near nurses station  Outcome: Progressing     Problem: GASTROINTESTINAL - ADULT  Goal: Minimal or absence of nausea and/or vomiting  Description: INTERVENTIONS:  - Administer IV fluids if ordered to ensure adequate hydration  - Maintain NPO status until nausea and vomiting are resolved  - Nasogastric tube if ordered  - Administer ordered antiemetic medications as needed  - Provide nonpharmacologic comfort measures as appropriate  - Advance diet as tolerated, if ordered  - Consider nutrition services referral to assist patient with adequate nutrition and appropriate food choices  Outcome: Progressing  Goal: Maintains or returns to baseline bowel function  Description: INTERVENTIONS:  - Assess bowel function  - Encourage oral fluids to ensure adequate hydration  - Administer IV fluids if ordered to ensure adequate hydration  - Administer ordered medications as needed  - Encourage mobilization and activity  - Consider nutritional services referral to assist patient with adequate nutrition and appropriate food choices  Outcome: Progressing  Goal: Maintains adequate nutritional intake  Description: INTERVENTIONS:  - Monitor percentage of each meal consumed  - Identify factors contributing to decreased intake, treat as appropriate  - Assist with meals as needed  - Monitor I&O, weight, and lab values if indicated  - Obtain nutrition services referral as needed  Outcome: Progressing     Problem: HEMATOLOGIC - ADULT  Goal: Maintains hematologic stability  Description: INTERVENTIONS  - Assess for signs and symptoms of bleeding or hemorrhage  - Monitor labs  - Administer supportive blood products/factors as ordered and appropriate  Outcome: Progressing

## 2021-08-06 NOTE — ASSESSMENT & PLAN NOTE
· POA:  Bloody bowel movement, no associated dizziness, lightheadedness, nausea, vomiting, diarrhea or abdominal pain  · Rectal bleeding likely secondary to diverticular bleed, proctitis, patient is now noted for C diff colitis possibly contributing to rectal bleed  · Plan for outpatient colonoscopy unless significant drop in hemoglobin requiring urgent intervention  · Patient currently is hemodynamically stable  · Hemoglobin mildly trending down  Recent Labs     08/05/21 2010 08/06/21  0442 08/06/21  0810   HGB 14 2 12 9 13 3     · Digital exam did reveal external hemorrhoids  · CT abdomen/pelvis showed proctitis, 4 6 cm subcutaneous enhancing rim which could be representing abdominal mesh given the patient history of hernia repair  · Previous CT 5/29/20 showed severe diverticular disease throughout the large intestine  · Patient denied previous endoscopy or colonoscopy  · Patient further denied change of bowel habits, weight loss, family history of colon cancer  · Monitor H&H and hemodynamics  Transfuse if hemoglobin less than 7  · GI is consulted  Recommendation is appreciated  Defer from colonoscopy at this time

## 2021-08-06 NOTE — PLAN OF CARE
Problem: MOBILITY - ADULT  Goal: Maintain or return to baseline ADL function  Description: INTERVENTIONS:  -  Assess patient's ability to carry out ADLs; assess patient's baseline for ADL function and identify physical deficits which impact ability to perform ADLs (bathing, care of mouth/teeth, toileting, grooming, dressing, etc )  - Assess/evaluate cause of self-care deficits   - Assess range of motion  - Assess patient's mobility; develop plan if impaired  - Assess patient's need for assistive devices and provide as appropriate  - Encourage maximum independence but intervene and supervise when necessary  - Involve family in performance of ADLs  - Assess for home care needs following discharge   - Consider OT consult to assist with ADL evaluation and planning for discharge  - Provide patient education as appropriate  8/5/2021 2049 by Jaquelin Alvarez RN  Outcome: Progressing  8/5/2021 2049 by Jaquelin Alvarez RN  Outcome: Progressing  Goal: Maintains/Returns to pre admission functional level  Description: INTERVENTIONS:  - Perform BMAT or MOVE assessment daily    - Set and communicate daily mobility goal to care team and patient/family/caregiver  - Collaborate with rehabilitation services on mobility goals if consulted  - Perform Range of Motion  times a day  - Reposition patient every hours    - Dangle patient  times a day  - Stand patient times a day  - Ambulate patient  times a day  - Out of bed to chair  times a day   - Out of bed for meals times a day  - Out of bed for toileting  - Record patient progress and toleration of activity level   8/5/2021 2049 by Jaquelin Alvarez RN  Outcome: Progressing  8/5/2021 2049 by Jaquelin Alvarez RN  Outcome: Progressing     Problem: Potential for Falls  Goal: Patient will remain free of falls  Description: INTERVENTIONS:  - Educate patient/family on patient safety including physical limitations  - Instruct patient to call for assistance with activity   - Consult OT/PT to assist with strengthening/mobility   - Keep Call bell within reach  - Keep bed low and locked with side rails adjusted as appropriate  - Keep care items and personal belongings within reach  - Initiate and maintain comfort rounds  - Make Fall Risk Sign visible to staff  - Offer Toileting every  Hours, in advance of need  - Initiate/Maintain alarm  - Obtain necessary fall risk management equipment:   - Apply yellow socks and bracelet for high fall risk patients  - Consider moving patient to room near nurses station  8/5/2021 2049 by Asiya Franco RN  Outcome: Progressing  8/5/2021 2049 by Asiya Franco RN  Outcome: Progressing     Problem: GASTROINTESTINAL - ADULT  Goal: Minimal or absence of nausea and/or vomiting  Description: INTERVENTIONS:  - Administer IV fluids if ordered to ensure adequate hydration  - Maintain NPO status until nausea and vomiting are resolved  - Nasogastric tube if ordered  - Administer ordered antiemetic medications as needed  - Provide nonpharmacologic comfort measures as appropriate  - Advance diet as tolerated, if ordered  - Consider nutrition services referral to assist patient with adequate nutrition and appropriate food choices  Outcome: Progressing  Goal: Maintains or returns to baseline bowel function  Description: INTERVENTIONS:  - Assess bowel function  - Encourage oral fluids to ensure adequate hydration  - Administer IV fluids if ordered to ensure adequate hydration  - Administer ordered medications as needed  - Encourage mobilization and activity  - Consider nutritional services referral to assist patient with adequate nutrition and appropriate food choices  Outcome: Progressing  Goal: Maintains adequate nutritional intake  Description: INTERVENTIONS:  - Monitor percentage of each meal consumed  - Identify factors contributing to decreased intake, treat as appropriate  - Assist with meals as needed  - Monitor I&O, weight, and lab values if indicated  - Obtain nutrition services referral as needed  Outcome: Progressing     Problem: HEMATOLOGIC - ADULT  Goal: Maintains hematologic stability  Description: INTERVENTIONS  - Assess for signs and symptoms of bleeding or hemorrhage  - Monitor labs  - Administer supportive blood products/factors as ordered and appropriate  Outcome: Progressing

## 2021-08-06 NOTE — UTILIZATION REVIEW
Inpatient Admission Authorization Request   NOTIFICATION OF INPATIENT ADMISSION/INPATIENT AUTHORIZATION REQUEST   SERVICING FACILITY:   [address], [facility], [address] [facility]  Tax ID: [id number]  NPI: [id number]  Place of Service: Inpatient [address] [facility]  [address]  Place of Service Code: 24     ATTENDING PROVIDER:  Attending Name and NPI#: Vnekat Hunter Md [[id number]]  Address: [address] [place] [address], [facility], [facility] [address] [facility]  Phone: [phone]     UTILIZATION REVIEW CONTACT:  [facility] Utilization   Network Utilization Review Department  Phone: [phone]  Fax: [phone]  Email: [email or web address]     PHYSICIAN ADVISORY SERVICES:  FOR FRVM-PS-VPAJ REVIEW - MEDICAL NECESSITY DENIAL  Phone: [phone]  Fax: [phone]  Email: [email or web address]     TYPE OF REQUEST:  Inpatient Status     ADMISSION INFORMATION:  ADMISSION DATE/TIME: 8/5/21  5:26 PM  PATIENT DIAGNOSIS CODE/DESCRIPTION:  Rectal bleeding [K62 5]  DISCHARGE DATE/TIME: No discharge date for patient encounter  DISCHARGE DISPOSITION (IF DISCHARGED): [address]     IMPORTANT INFORMATION:  Please contact the [facility] directly with any questions or concerns regarding this request  Department voicemails are confidential     Send requests for admission clinical reviews, concurrent reviews, approvals, and administrative denials due to lack of clinical to fax [phone]

## 2021-08-06 NOTE — ASSESSMENT & PLAN NOTE
As evidenced by CT of the abdomen, showed wall thickening of the rectum, likely secondary to proctitis  Patient did not complain of abdominal pain, nausea, or vomiting  No leukocytosis, and he is afebrile    C diff testing positive on PCR, negative on EIA, this usually indicates colonization but given patient is symptomatic GI in agreement to complete 10 day course of p o  vancomycin

## 2021-08-06 NOTE — CONSULTS
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   3  Loose stools  Pt presented to [facility] ER for rectal bleeding, per his wife and son  He had loose BM with associated BRB covering the toilet seat; CT in the ED showed rectal wall thickening  ALEXSANDER showed dried blood and external hemorrhoids  Hgb stable at 13 3 today  Pt's wife and son say that his BM was loose but not watery  They deny any recent travel or sick contacts but he was on cipro in June for UTI  Nursing confirms pt had about 5 episodes of BRB VA overnight  Pt denies any abdominal pain, n/v, heartburn  NSAID use is unknown  No prior EGD or colonoscopy  -pt would benefit from endoscopic eval but due to inflammatory changes in his colon, new onset of loose stools, recent use of Abx, and stable Hgb, infectious etiology must be ruled out first    -stool studies placed to rule out infection; fecal kandi and CRP placed to rule out underlying IBD  -monitor H&H an transfuse as necessary  -awaiting stool study results with potential EGD and colon next week if Hgb remains stable; he would have to be optimized due to chronic respiratory failure prior to any inpatient scopes     ______________________________________________________________________    HPI:  Sraah Durbin is a 81 y/o Kosovan-speaking male with COPD on 2L NC, HTN, HLD, hx of DVT and CAD who presented to [facility] ED for rectal bleeding  He was transferred to [facility] and placed as a step-down   When speaking to the ICU resident, it is unknown why he is on step-down at this time  The patient says he is unaware as to why he is here but according to his son and wife, he had one episode of BRB NH yesterday that was "large" in volume  They said that it was not mixed with his stool, rather it was coating the toilet seat  They deny melena  They also say that his BMs have been "looser" in nature, but deny overt diarrhea  They deny any family hx of colon cancer, unintentional weight loss, fevers, chills  The patient also denies any abdominal pain, n/v  CT in the ED showed "Fluid within the rectum with mild diffuse upper rectal wall thickening, suggesting mild proctitis  Extensive colonic diverticulosis without acute diverticulitis  " It also showed hypodense stricture of the R rectus abdominis muscle to anterior aspect of R inguinal hernia sac, potentially due to hernia sac migration  Hgb is stable at 13  3  CRE was done and showed dry blood around the anus and external hemorrhoids  Patient does have extensive surgical hx of inguinal hernia repairs with mesh  REVIEW OF SYSTEMS:    CONSTITUTIONAL: Denies any fever, chills, rigors, and weight loss  HEENT: No earache or tinnitus  Denies hearing loss or visual disturbances  CARDIOVASCULAR: No chest pain or palpitations  RESPIRATORY: Denies any cough, hemoptysis, shortness of breath or dyspnea on exertion  GASTROINTESTINAL: As noted in the History of Present Illness  GENITOURINARY: No problems with urination  Denies any hematuria or dysuria  NEUROLOGIC: No dizziness or vertigo, denies headaches  MUSCULOSKELETAL: Denies any muscle or joint pain  SKIN: Denies skin rashes or itching  ENDOCRINE: Denies excessive thirst  Denies intolerance to heat or cold  PSYCHOSOCIAL: Denies depression or anxiety  Denies any recent memory loss         Historical Information   Past Medical History:   Diagnosis Date    Acute metabolic encephalopathy 5/48/2732    Appendicolith     Ascending aortic aneurysm ([facility] )     3 7    Asthma     BPH (benign prostatic hyperplasia)     CAD (coronary artery disease)     noted on CT scan    COPD (chronic obstructive pulmonary disease) (HCC)     Descending thoracic aortic aneurysm ([facility] )     Diverticulosis     Former tobacco use     GERD (gastroesophageal reflux disease)     History of DVT (deep vein thrombosis)     Left leg    History of transfusion     Hypertension     Inguinal hernia     right    Nephrolithiasis     Oxygen dependent     2LNC    Prostate calculus     PVD (peripheral vascular disease) (HCC)     Ulcer     Varicose vein of leg     b/l     Past Surgical History:   Procedure Laterality Date    CARDIAC SURGERY      ESOPHAGOGASTRODUODENOSCOPY      HERNIA REPAIR Right 2019    Procedure: REPAIR HERNIA INGUINAL WITH MESH;  Surgeon: Deven Meyers MD;  Location:  MAIN OR;  Service: General    INGUINAL HERNIA REPAIR Bilateral     IR TEVAR  2018    MO ENDOVASC TAA REINCL SUBCL N/A 2018    Procedure: TEVAR - endovascular thoracic aortic aneurysm repair;  Surgeon: Ramiro Ortega MD;  Location:  MAIN OR;  Service: Vascular    THORACIC AORTIC ANEURYSM REPAIR  2018    VARICOSE VEIN SURGERY Bilateral     vein stripping     Social History   Social History     Substance and Sexual Activity   Alcohol Use Never     Social History     Substance and Sexual Activity   Drug Use No     Social History     Tobacco Use   Smoking Status Former Smoker    Packs/day: 1 00    Years: 35 00    Pack years: 35 00    Start date:     Quit date:     Years since quittin 6   Smokeless Tobacco Never Used     Family History   Problem Relation Age of Onset    Tuberculosis Mother     No Known Problems Father     Cancer Sister     Diabetes Family     Hypertension Family        Meds/Allergies     Medications Prior to Admission   Medication    acetaminophen (TYLENOL) 325 mg tablet    albuterol (2 5 mg/3 mL) 0 083 % nebulizer solution    albuterol (VENTOLIN HFA) 90 mcg/act inhaler    amLODIPine (NORVASC) 10 mg tablet    ASPIRIN 81 81 MG EC tablet    atorvastatin (LIPITOR) 10 mg tablet    budesonide-formoterol (SYMBICORT) 160-4 5 mcg/act inhaler    hydrochlorothiazide (HYDRODIURIL) 12 5 mg tablet    losartan (COZAAR) 100 MG tablet    melatonin 3 mg    metoprolol tartrate (LOPRESSOR) 50 mg tablet    pantoprazole (PROTONIX) 40 mg tablet    tiotropium (SPIRIVA) 18 mcg inhalation capsule     Current Facility-Administered Medications   Medication Dose Route Frequency    acetaminophen (TYLENOL) tablet 650 mg  650 mg Oral Q6H PRN    albuterol inhalation solution 2 5 mg  2 5 mg Nebulization Q6H PRN    amLODIPine (NORVASC) tablet 10 mg  10 mg Oral Daily    atorvastatin (LIPITOR) tablet 10 mg  10 mg Oral Daily With Dinner    budesonide-formoterol (SYMBICORT) 160-4 5 mcg/act inhaler 2 puff  2 puff Inhalation BID    hydrALAZINE (APRESOLINE) injection 5 mg  5 mg Intravenous Q6H PRN    losartan (COZAAR) tablet 100 mg  100 mg Oral After Dinner    metoprolol tartrate (LOPRESSOR) tablet 50 mg  50 mg Oral Q12H KIKE    pantoprazole (PROTONIX) injection 40 mg  40 mg Intravenous Q24H KIKE    sodium chloride 0 9 % infusion  75 mL/hr Intravenous Continuous    tiotropium (SPIRIVA) capsule for inhaler 18 mcg  18 mcg Inhalation Daily       Allergies   Allergen Reactions    Penicillins Hives, Itching and Rash    Lisinopril Rash     Side pains and rash            Objective     Blood pressure 121/63, pulse (!) 54, temperature 98 °F (36 7 °C), temperature source Temporal, resp  rate 22, height 5' 1" (1 549 m), weight 64 5 kg (142 lb 3 2 oz), SpO2 95 %  Body mass index is 26 87 kg/m²        Intake/Output Summary (Last 24 hours) at 8/6/2021 7088  Last data filed at 8/6/2021 0410  Gross per 24 hour   Intake --   Output 1175 ml   Net -1175 ml         PHYSICAL EXAM:      General Appearance:   Alert, cooperative, no distress   HEENT:   Normocephalic, atraumatic, anicteric      Neck:  Supple, symmetrical, trachea midline   Lungs:   Clear to auscultation bilaterally; no rales, rhonchi or wheezing; respirations unlabored    Heart[de-identified]   Regular rate and rhythm; no murmur, rub, or gallop  Abdomen:   Soft, non-tender, non-distended; normal bowel sounds; no masses, no organomegaly    Genitalia:   Deferred    Rectal:   Deferred    Extremities:  No cyanosis, clubbing or edema    Pulses:  2+ and symmetric all extremities    Skin:  No jaundice, rashes, or lesions    Lymph nodes:  No palpable cervical lymphadenopathy        Lab Results:   Admission on 08/05/2021   Component Date Value    Hemoglobin 08/05/2021 14 2     Sodium 08/06/2021 145     Potassium 08/06/2021 3 8     Chloride 08/06/2021 103     CO2 08/06/2021 42*    ANION GAP 08/06/2021 0*    BUN 08/06/2021 17     Creatinine 08/06/2021 0 98     Glucose 08/06/2021 93     Calcium 08/06/2021 8 5     eGFR 08/06/2021 71     WBC 08/06/2021 6 85     RBC 08/06/2021 4 16     Hemoglobin 08/06/2021 12 9     Hematocrit 08/06/2021 44 8     MCV 08/06/2021 108*    MCH 08/06/2021 31 0     MCHC 08/06/2021 28 8*    RDW 08/06/2021 13 2     MPV 08/06/2021 8 8*    Platelets 06/96/8715 116*    nRBC 08/06/2021 0     Neutrophils Relative 08/06/2021 54     Immat GRANS % 08/06/2021 0     Lymphocytes Relative 08/06/2021 21     Monocytes Relative 08/06/2021 11     Eosinophils Relative 08/06/2021 13*    Basophils Relative 08/06/2021 1     Neutrophils Absolute 08/06/2021 3 65     Immature Grans Absolute 08/06/2021 0 01     Lymphocytes Absolute 08/06/2021 1 46     Monocytes Absolute 08/06/2021 0 77     Eosinophils Absolute 08/06/2021 0 91*    Basophils Absolute 08/06/2021 0 05     Hemoglobin 08/06/2021 13 3        Imaging Studies: I have personally reviewed pertinent imaging studies

## 2021-08-06 NOTE — ASSESSMENT & PLAN NOTE
· Blood pressure is elevated on presentation 180s/100s   · Continue home regimen with Lopressor 50 mg daily, losartan 100 mg daily Norvasc 10 mg daily  · Continue hydralazine p r n   For SBP more than 180  · Monitor blood pressure

## 2021-08-06 NOTE — ASSESSMENT & PLAN NOTE
· POA:  Bloody bowel movement, no associated dizziness, lightheadedness, nausea, vomiting, diarrhea or abdominal pain  · Rectal bleeding likely secondary to diverticular bleed, proctitis, colon cancer cannot be ruled out  · Patient currently is hemodynamically stable  · Hemoglobin trending down but still in normal range  Recent Labs     08/05/21  1233 08/05/21 2010 08/06/21  0442   HGB 13 8 14 2 12 9     · Digital exam did reveal external hemorrhoids  · CT abdomen/pelvis showed proctitis, 4 6 cm subcutaneous enhancing rim which could be representing abdominal mesh given the patient history of hernia repair  · Previous CT 5/29/20 showed severe diverticular disease throughout the large intestine  · Patient denied previous endoscopy or colonoscopy  · Patient further denied change of bowel habits, weight loss, family history of colon cancer  · Monitor H&H and hemodynamics  Transfuse if hemoglobin less than 7  · GI is consulted  Recommendation is appreciated      ·

## 2021-08-06 NOTE — ASSESSMENT & PLAN NOTE
· Platelet count 177 on presentation, patient has chronic mild thrombocytopenia currently around baseline  · Slightly below baseline    · Continue monitor CBC

## 2021-08-06 NOTE — QUICK NOTE
I contacted the son, Amaya Salazar, and give him an update that patient is stable to transfer to [facility]  All questions were answered

## 2021-08-07 PROBLEM — A49.8 CLOSTRIDIUM DIFFICILE INFECTION: Status: ACTIVE | Noted: 2021-08-07

## 2021-08-07 LAB
CAMPYLOBACTER DNA SPEC NAA+PROBE: NORMAL
CRP SERPL QL: 6 MG/L
G LAMBLIA AG STL QL IA: NEGATIVE
HGB BLD-MCNC: 11.7 G/DL (ref 12–17)
HGB BLD-MCNC: 11.8 G/DL (ref 12–17)
SALMONELLA DNA SPEC QL NAA+PROBE: NORMAL
SHIGA TOXIN STX GENE SPEC NAA+PROBE: NORMAL
SHIGELLA DNA SPEC QL NAA+PROBE: NORMAL

## 2021-08-07 PROCEDURE — 99232 SBSQ HOSP IP/OBS MODERATE 35: CPT | Performed by: INTERNAL MEDICINE

## 2021-08-07 PROCEDURE — 86140 C-REACTIVE PROTEIN: CPT | Performed by: INTERNAL MEDICINE

## 2021-08-07 PROCEDURE — 99232 SBSQ HOSP IP/OBS MODERATE 35: CPT | Performed by: FAMILY MEDICINE

## 2021-08-07 PROCEDURE — 85018 HEMOGLOBIN: CPT | Performed by: INTERNAL MEDICINE

## 2021-08-07 RX ADMIN — Medication 125 MG: at 00:31

## 2021-08-07 RX ADMIN — BUDESONIDE AND FORMOTEROL FUMARATE DIHYDRATE 2 PUFF: 160; 4.5 AEROSOL RESPIRATORY (INHALATION) at 17:20

## 2021-08-07 RX ADMIN — ATORVASTATIN CALCIUM 10 MG: 10 TABLET, FILM COATED ORAL at 17:20

## 2021-08-07 RX ADMIN — Medication 125 MG: at 13:00

## 2021-08-07 RX ADMIN — AMLODIPINE BESYLATE 10 MG: 10 TABLET ORAL at 09:17

## 2021-08-07 RX ADMIN — BUDESONIDE AND FORMOTEROL FUMARATE DIHYDRATE 2 PUFF: 160; 4.5 AEROSOL RESPIRATORY (INHALATION) at 09:18

## 2021-08-07 RX ADMIN — Medication 125 MG: at 06:38

## 2021-08-07 RX ADMIN — PANTOPRAZOLE SODIUM 40 MG: 40 TABLET, DELAYED RELEASE ORAL at 06:38

## 2021-08-07 RX ADMIN — TIOTROPIUM BROMIDE 18 MCG: 18 CAPSULE ORAL; RESPIRATORY (INHALATION) at 09:18

## 2021-08-07 RX ADMIN — Medication 125 MG: at 17:20

## 2021-08-07 RX ADMIN — METOPROLOL TARTRATE 50 MG: 50 TABLET, FILM COATED ORAL at 09:17

## 2021-08-07 NOTE — PROGRESS NOTES
Progress Note- Prosper Toscano 82 y o  male MRN: [id number]    Unit/Bed#: [facility] 68 2 -01 Encounter: [id number]      Assessment and Plan:    Cosme Turcios is a 83 y/o [place]auan-speaking male with COPD on 2L NC, HTN, HLD, hx of DVT and CAD who presented to [facility] ED for rectal bleeding      1  Rectal Bleeding  2  Proctitis on imaging   3  Loose stools  4  Recent Antibiotic use  5  C  Diff colonization  Pt presented to [facility] ER for rectal bleeding, per his wife and son  He had loose BM with associated BRB covering the toilet seat; CT in the ED showed rectal wall thickening  ALEXSANDER showed dried blood and external hemorrhoids  Hgb stable on admission at 13  Pt's wife and son say that his BM was loose but not watery  They deny any recent travel or sick contacts but he was on cipro in June for UTI  Nursing says he has not had any further BRB NM but stools are still loose  Pt is requesting a diet today  Hgb 11 7 today  C diff PCR + with negative EIA     -awaiting fecal kandi and remainder of stool studies  -as pt is symptomatic, start on vanco  Q 6 x 10 days  -monitor H&H an transfuse as necessary  -if BRB NM returns and/or Hgb continues to drop, can consider OP colonoscopy after C diff tx unless Hgb drops emergently   ______________________________________________________________________    Subjective:     Pt no longer has any BRB NM but stools are still loose this AM      Medication Administration - last 24 hours from 08/06/2021 1113 to 08/07/2021 1113       Date/Time Order Dose Route Action Action by     08/07/2021 0917 amLODIPine (NORVASC) tablet 10 mg 10 mg Oral Given Nedra Ingram RN     08/06/2021 1508 amLODIPine (NORVASC) tablet 10 mg   Oral MAR Isabelle Bedolla RN     08/06/2021 1257 amLODIPine (NORVASC) tablet 10 mg   Oral MAR Hold Automatic Transfer Provider     08/06/2021 1740 atorvastatin (LIPITOR) tablet 10 mg 10 mg Oral Given Abdullahi Cantrell RN     08/06/2021 1508 atorvastatin (LIPITOR) tablet 10 mg   Oral MAR Arie Ross RN     08/06/2021 1257 atorvastatin (LIPITOR) tablet 10 mg   Oral MAR Hold Automatic Transfer Provider     08/07/2021 0918 budesonide-formoterol (SYMBICORT) 160-4 5 mcg/act inhaler 2 puff 2 puff Inhalation Given Barber Crenshaw RN     08/06/2021 1745 budesonide-formoterol (SYMBICORT) 160-4 5 mcg/act inhaler 2 puff 2 puff Inhalation Refused Arya Blackman, SCHUYLER     08/06/2021 1508 budesonide-formoterol (SYMBICORT) 160-4 5 mcg/act inhaler 2 puff   Inhalation MAR Arie Ross RN     08/06/2021 1257 budesonide-formoterol (SYMBICORT) 160-4 5 mcg/act inhaler 2 puff   Inhalation MAR Hold Automatic Transfer Provider     08/06/2021 1745 losartan (COZAAR) tablet 100 mg 100 mg Oral Given Arya Blackman RN     08/06/2021 1508 losartan (COZAAR) tablet 100 mg   Oral MAR Unhold Arya Blackman, SCHUYLER     08/06/2021 1257 losartan (COZAAR) tablet 100 mg   Oral MAR Hold Automatic Transfer Provider     08/07/2021 0917 metoprolol tartrate (LOPRESSOR) tablet 50 mg 50 mg Oral Given Barber Crenshaw RN     08/06/2021 2229 metoprolol tartrate (LOPRESSOR) tablet 50 mg 0 mg Oral Hold Laura Hercules RN     08/06/2021 1508 metoprolol tartrate (LOPRESSOR) tablet 50 mg   Oral MAR Unhold Arya Blackman RN     08/06/2021 1257 metoprolol tartrate (LOPRESSOR) tablet 50 mg   Oral MAR Hold Automatic Transfer Provider     08/07/2021 0918 tiotropium (SPIRIVA) capsule for inhaler 18 mcg 18 mcg Inhalation Given Barber Crenshaw RN     08/06/2021 1508 tiotropium (SPIRIVA) capsule for inhaler 18 mcg   Inhalation MAR Unhold Arya Blackman RN     08/06/2021 1257 tiotropium (SPIRIVA) capsule for inhaler 18 mcg   Inhalation MAR Hold Automatic Transfer Provider     08/06/2021 1508 acetaminophen (TYLENOL) tablet 650 mg   Oral MAR Unhold Arya Blackman RN     08/06/2021 1257 acetaminophen (TYLENOL) tablet 650 mg   Oral MAR Hold Automatic Transfer Provider     08/06/2021 1508 albuterol inhalation solution 2 5 mg   Nebulization MAR Unhold Arya Blackman, RN     08/06/2021 1257 albuterol inhalation solution 2 5 mg   Nebulization MAR Hold Automatic Transfer Provider     08/06/2021 1508 hydrALAZINE (APRESOLINE) injection 5 mg   Intravenous MAR Unhold Rudi Anthony RN     08/06/2021 1257 hydrALAZINE (APRESOLINE) injection 5 mg   Intravenous MAR Hold Automatic Transfer Provider     08/07/2021 0638 pantoprazole (PROTONIX) EC tablet 40 mg 40 mg Oral Given Randee Brody RN     08/06/2021 1508 pantoprazole (PROTONIX) EC tablet 40 mg   Oral MAR Unhold Rudi Anthony RN     08/06/2021 1257 pantoprazole (PROTONIX) EC tablet 40 mg   Oral MAR Hold Automatic Transfer Provider     08/07/2021 3650 vancomycin (VANCOCIN) oral solution 125 mg 125 mg Oral Given Randee Brody RN     08/07/2021 0031 vancomycin (VANCOCIN) oral solution 125 mg 125 mg Oral Given Randee Brody RN     08/06/2021 1745 vancomycin (VANCOCIN) oral solution 125 mg 125 mg Oral Given Rudi Anthony RN          Objective:     Vitals: Blood pressure 114/54, pulse 68, temperature 98 7 °F (37 1 °C), temperature source Oral, resp  rate 18, height 5' 1" (1 549 m), weight 64 5 kg (142 lb 3 2 oz), SpO2 100 %  ,Body mass index is 26 87 kg/m²      No intake or output data in the 24 hours ending 08/07/21 1113    Physical Exam:   General Appearance: Awake and alert, in no acute distress  Abdomen: Soft, non-tender, non-distended; bowel sounds normal; no masses or no organomegaly    Invasive Devices     Peripheral Intravenous Line            Peripheral IV 08/05/21 Right Antecubital 2 days    Peripheral IV 08/06/21 Left Antecubital 1 day                Lab Results:  Admission on 08/05/2021   Component Date Value    Hemoglobin 08/05/2021 14 2     Sodium 08/06/2021 145     Potassium 08/06/2021 3 8     Chloride 08/06/2021 103     CO2 08/06/2021 42*    ANION GAP 08/06/2021 0*    BUN 08/06/2021 17     Creatinine 08/06/2021 0 98     Glucose 08/06/2021 93     Calcium 08/06/2021 8 5     eGFR 08/06/2021 71     WBC 08/06/2021 6 85     RBC 08/06/2021 4 16     Hemoglobin 08/06/2021 12 9     Hematocrit 08/06/2021 44 8     MCV 08/06/2021 108*    MCH 08/06/2021 31 0     MCHC 08/06/2021 28 8*    RDW 08/06/2021 13 2     MPV 08/06/2021 8 8*    Platelets 95/54/0701 116*    nRBC 08/06/2021 0     Neutrophils Relative 08/06/2021 54     Immat GRANS % 08/06/2021 0     Lymphocytes Relative 08/06/2021 21     Monocytes Relative 08/06/2021 11     Eosinophils Relative 08/06/2021 13*    Basophils Relative 08/06/2021 1     Neutrophils Absolute 08/06/2021 3 65     Immature Grans Absolute 08/06/2021 0 01     Lymphocytes Absolute 08/06/2021 1 46     Monocytes Absolute 08/06/2021 0 77     Eosinophils Absolute 08/06/2021 0 91*    Basophils Absolute 08/06/2021 0 05     Hemoglobin 08/06/2021 13 3     ABO Grouping 08/06/2021 O     Rh Factor 08/06/2021 Positive     Antibody Screen 08/06/2021 Negative     Specimen Expiration Date 08/06/2021 [id number]      C difficile toxin by PC* 08/06/2021 Positive*    C difficile Toxins A+B, * 08/06/2021 Negative     Hemoglobin 08/06/2021 12 5     CRP 08/07/2021 6 0*    Hemoglobin 08/07/2021 11 7*       Imaging Studies: I have personally reviewed pertinent imaging studies

## 2021-08-07 NOTE — ASSESSMENT & PLAN NOTE
Stool PCR positive for C diff, negative EIA - consistent with C diff colonization rather than acute infection  As above, due to symptoms proceed with treatment with p o  vancomycin for 10 days per GI recommendations

## 2021-08-07 NOTE — PROGRESS NOTES
[facility]  Progress Note Kenna Canchola [birth year], 82 y o  male MRN: [id number]  Unit/Bed#: [facility] 87 203-01 Encounter: [id number]  Primary Care Provider: Chris Ramachandran MD   Date and time admitted to hospital: 8/5/2021  5:26 PM    * Rectal bleeding  Assessment & Plan  · POA:  Bloody bowel movement, no associated dizziness, lightheadedness, nausea, vomiting, diarrhea or abdominal pain  · Rectal bleeding likely secondary to diverticular bleed, proctitis, patient is now noted for C diff colitis possibly contributing to rectal bleed  · Plan for outpatient colonoscopy unless significant drop in hemoglobin requiring urgent intervention  · Patient currently is hemodynamically stable  · Hemoglobin mildly trending down  Recent Labs     08/05/21 2010 08/06/21  0442 08/06/21  0810   HGB 14 2 12 9 13 3     · Digital exam did reveal external hemorrhoids  · CT abdomen/pelvis showed proctitis, 4 6 cm subcutaneous enhancing rim which could be representing abdominal mesh given the patient history of hernia repair  · Previous CT 5/29/20 showed severe diverticular disease throughout the large intestine  · Patient denied previous endoscopy or colonoscopy  · Patient further denied change of bowel habits, weight loss, family history of colon cancer  · Monitor H&H and hemodynamics  Transfuse if hemoglobin less than 7  · GI is consulted  Recommendation is appreciated  Defer from colonoscopy at this time  Clostridium difficile infection  Assessment & Plan  Stool PCR positive for C diff, negative EIA - consistent with C diff colonization rather than acute infection  As above, due to symptoms proceed with treatment with p o  vancomycin for 10 days per GI recommendations    Proctitis  Assessment & Plan  As evidenced by CT of the abdomen, showed wall thickening of the rectum, likely secondary to proctitis  Patient did not complain of abdominal pain, nausea, or vomiting    No leukocytosis, and he is afebrile  C diff testing positive on PCR, negative on EIA, this usually indicates colonization but given patient is symptomatic GI in agreement to complete 10 day course of p o  vancomycin    Hyperlipidemia  Assessment & Plan  · Continue statin    Thrombocytopenia (HCC)  Assessment & Plan  · Platelet count 752 on presentation, patient has chronic mild thrombocytopenia currently around baseline  · Slightly below baseline at 116 today  · Continue to monitor CBC    Chronic respiratory failure with hypoxia (HCC)  Assessment & Plan  In the setting of COPD, on supplemental O2 2L NC at baseline     Benign essential hypertension  Assessment & Plan  · Blood pressure is elevated on presentation 180s/100s   · Continue home regimen with Lopressor 50 mg daily, losartan 100 mg daily Norvasc 10 mg daily  · Continue hydralazine p r n  For SBP more than 180  · Monitor blood pressure    COPD (chronic obstructive pulmonary disease) ([facility])  Assessment & Plan  · On chronic supplemental oxygen at 2 L at home, currently saturating mid 90s on 2 L  · There is no signs or symptoms of exacerbation on exam  · Continue home regimen with Symbicort, Spiriva and albuterol  · Continue to monitor respiratory status  · Respiratory protocol        VTE Pharmacologic Prophylaxis:   Pharmacologic: None due to GIB  Mechanical VTE Prophylaxis in Place: Yes    Patient Centered Rounds: I have performed bedside rounds with nursing staff today  Discussions with Specialists or Other Care Team Provider: GI    Education and Discussions with Family / Patient: Patient, patient's wife and daughter    Time Spent for Care: 45 minutes  More than 50% of total time spent on counseling and coordination of care as described above      Current Length of Stay: 2 day(s)    Current Patient Status: Inpatient   Certification Statement: The patient will continue to require additional inpatient hospital stay due to close monitoring    Discharge Plan: 2 days    Code Status: Level 1 - Full Code      Subjective:   Patient seen and examined  He is sleeping but arousable  He appears tired  He does not voice significant complaints  Patient's wife at bedside  Objective:     Vitals:   Temp (24hrs), Av 3 °F (36 8 °C), Min:97 9 °F (36 6 °C), Max:98 7 °F (37 1 °C)    Temp:  [97 9 °F (36 6 °C)-98 7 °F (37 1 °C)] 98 7 °F (37 1 °C)  HR:  [53-68] 68  Resp:  [18] 18  BP: (109-114)/(54-59) 114/54  SpO2:  [94 %-100 %] 100 %  Body mass index is 26 87 kg/m²  Input and Output Summary (last 24 hours):     No intake or output data in the 24 hours ending 21 3147    Physical Exam:     Physical Exam  Constitutional:       General: He is not in acute distress  Appearance: He is ill-appearing  HENT:      Head: Normocephalic and atraumatic  Nose: No congestion  Mouth/Throat:      Pharynx: Oropharynx is clear  Eyes:      Conjunctiva/sclera: Conjunctivae normal    Cardiovascular:      Rate and Rhythm: Normal rate and regular rhythm  Heart sounds: No murmur heard  Pulmonary:      Effort: No respiratory distress  Breath sounds: No wheezing or rales  Abdominal:      General: There is no distension  Tenderness: There is no abdominal tenderness  There is no guarding  Musculoskeletal:      Right lower leg: No edema  Left lower leg: No edema  Skin:     General: Skin is warm and dry  Neurological:      Mental Status: He is oriented to person, place, and time     Psychiatric:         Mood and Affect: Mood normal          Additional Data:     Labs:    Results from last 7 days   Lab Units 21  0751 21  0442 21  0934   WBC Thousand/uL  --  6 85 6 80   HEMOGLOBIN g/dL 11 7* 12 9 15 3   HEMATOCRIT %  --  44 8 47 6   PLATELETS Thousands/uL  --  116* 144*   BANDS PCT %  --   --  6   NEUTROS PCT %  --  54  --    LYMPHS PCT %  --  21  --    LYMPHO PCT %  --   --  18*   MONOS PCT %  --  11  --    MONO PCT %  --   --  12*   EOS PCT %  --  13* 10*     Results from last 7 days   Lab Units 08/06/21  0442 08/05/21  1555   SODIUM mmol/L 145 144   POTASSIUM mmol/L 3 8 3 2*   CHLORIDE mmol/L 103 101   CO2 mmol/L 42* 39*   BUN mg/dL 17 18   CREATININE mg/dL 0 98 0 74   ANION GAP mmol/L 0* 4*   CALCIUM mg/dL 8 5 7 1*   ALBUMIN g/dL  --  2 8*   TOTAL BILIRUBIN mg/dL  --  0 73   ALK PHOS U/L  --  45   ALT U/L  --  13   AST U/L  --  26   GLUCOSE RANDOM mg/dL 93 81     Results from last 7 days   Lab Units 08/05/21  0934   INR  1 03                       * I Have Reviewed All Lab Data Listed Above  * Additional Pertinent Lab Tests Reviewed:  Shakir 66 Admission Reviewed    Imaging:    Imaging Reports Reviewed Today Include: no new       Recent Cultures (last 7 days):     Results from last 7 days   Lab Units 08/06/21  0959   C DIFF TOXIN B BY PCR  Positive*       Last 24 Hours Medication List:   Current Facility-Administered Medications   Medication Dose Route Frequency Provider Last Rate    acetaminophen  650 mg Oral Q6H PRN Keith Wolf MD      albuterol  2 5 mg Nebulization Q6H PRN Keith Wolf MD      amLODIPine  10 mg Oral Daily Keith Wolf MD      atorvastatin  10 mg Oral Daily With Dinner Keith Wolf MD      budesonide-formoterol  2 puff Inhalation BID Keith Wolf MD      hydrALAZINE  5 mg Intravenous Q6H PRN Keith Wolf MD      losartan  100 mg Oral After Dinner Keith Wolf MD      metoprolol tartrate  50 mg Oral Q12H [facility] Keith Wolf MD      pantoprazole  40 mg Oral Early Morning Keith Wolf MD      sodium chloride  75 mL/hr Intravenous Continuous Keith Wolf MD 75 mL/hr (08/05/21 1900)    tiotropium  18 mcg Inhalation Daily Keith Wolf MD      vancomycin  125 mg Oral Q6H Nory Goldstein MD          Today, Patient Was Seen By: Deven Chang MD    ** Please Note: Dictation voice to text software may have been used in the creation of this document   **

## 2021-08-07 NOTE — PLAN OF CARE
Problem: MOBILITY - ADULT  Goal: Maintain or return to baseline ADL function  Description: INTERVENTIONS:  -  Assess patient's ability to carry out ADLs; assess patient's baseline for ADL function and identify physical deficits which impact ability to perform ADLs (bathing, care of mouth/teeth, toileting, grooming, dressing, etc )  - Assess/evaluate cause of self-care deficits   - Assess range of motion  - Assess patient's mobility; develop plan if impaired  - Assess patient's need for assistive devices and provide as appropriate  - Encourage maximum independence but intervene and supervise when necessary  - Involve family in performance of ADLs  - Assess for home care needs following discharge   - Consider OT consult to assist with ADL evaluation and planning for discharge  - Provide patient education as appropriate  Outcome: Progressing  Goal: Maintains/Returns to pre admission functional level  Description: INTERVENTIONS:  - Perform BMAT or MOVE assessment daily    - Set and communicate daily mobility goal to care team and patient/family/caregiver     - Collaborate with rehabilitation services on mobility goals if consulted  - Dangle patient 3 times a day  - Stand patient 3 times a day  - Ambulate patient 3 times a day  - Out of bed to chair 3 times a day   - Out of bed for meals 3 times a day  - Out of bed for toileting  - Record patient progress and toleration of activity level   Outcome: Progressing     Problem: Potential for Falls  Goal: Patient will remain free of falls  Description: INTERVENTIONS:  - Educate patient/family on patient safety including physical limitations  - Instruct patient to call for assistance with activity   - Consult OT/PT to assist with strengthening/mobility   - Keep Call bell within reach  - Keep bed low and locked with side rails adjusted as appropriate  - Keep care items and personal belongings within reach  - Initiate and maintain comfort rounds  - Make Fall Risk Sign visible to staff  - Obtain necessary fall risk management equipment  - Apply yellow socks and bracelet for high fall risk patients  - Consider moving patient to room near nurses station  Outcome: Progressing     Problem: GASTROINTESTINAL - ADULT  Goal: Minimal or absence of nausea and/or vomiting  Description: INTERVENTIONS:  - Administer IV fluids if ordered to ensure adequate hydration  - Maintain NPO status until nausea and vomiting are resolved  - Nasogastric tube if ordered  - Administer ordered antiemetic medications as needed  - Provide nonpharmacologic comfort measures as appropriate  - Advance diet as tolerated, if ordered  - Consider nutrition services referral to assist patient with adequate nutrition and appropriate food choices  Outcome: Progressing  Goal: Maintains or returns to baseline bowel function  Description: INTERVENTIONS:  - Assess bowel function  - Encourage oral fluids to ensure adequate hydration  - Administer IV fluids if ordered to ensure adequate hydration  - Administer ordered medications as needed  - Encourage mobilization and activity  - Consider nutritional services referral to assist patient with adequate nutrition and appropriate food choices  Outcome: Progressing  Goal: Maintains adequate nutritional intake  Description: INTERVENTIONS:  - Monitor percentage of each meal consumed  - Identify factors contributing to decreased intake, treat as appropriate  - Assist with meals as needed  - Monitor I&O, weight, and lab values if indicated  - Obtain nutrition services referral as needed  Outcome: Progressing     Problem: HEMATOLOGIC - ADULT  Goal: Maintains hematologic stability  Description: INTERVENTIONS  - Assess for signs and symptoms of bleeding or hemorrhage  - Monitor labs  - Administer supportive blood products/factors as ordered and appropriate  Outcome: Progressing

## 2021-08-08 ENCOUNTER — APPOINTMENT (INPATIENT)
Dept: RADIOLOGY | Facility: HOSPITAL | Age: 86
DRG: 378 | End: 2021-08-08
Payer: COMMERCIAL

## 2021-08-08 PROBLEM — B99.9 FEVER DUE TO INFECTION: Status: ACTIVE | Noted: 2021-08-08

## 2021-08-08 LAB
ALBUMIN SERPL BCP-MCNC: 3 G/DL (ref 3.5–5)
ALP SERPL-CCNC: 70 U/L (ref 46–116)
ALT SERPL W P-5'-P-CCNC: 17 U/L (ref 12–78)
ANION GAP SERPL CALCULATED.3IONS-SCNC: 5 MMOL/L (ref 4–13)
AST SERPL W P-5'-P-CCNC: 25 U/L (ref 5–45)
BASOPHILS # BLD AUTO: 0.02 THOUSANDS/ΜL (ref 0–0.1)
BASOPHILS NFR BLD AUTO: 0 % (ref 0–1)
BILIRUB SERPL-MCNC: 0.31 MG/DL (ref 0.2–1)
BUN SERPL-MCNC: 20 MG/DL (ref 5–25)
CALCIUM ALBUM COR SERPL-MCNC: 9.2 MG/DL (ref 8.3–10.1)
CALCIUM SERPL-MCNC: 8.4 MG/DL (ref 8.3–10.1)
CHLORIDE SERPL-SCNC: 102 MMOL/L (ref 100–108)
CO2 SERPL-SCNC: 38 MMOL/L (ref 21–32)
CREAT SERPL-MCNC: 0.99 MG/DL (ref 0.6–1.3)
EOSINOPHIL # BLD AUTO: 0.85 THOUSAND/ΜL (ref 0–0.61)
EOSINOPHIL NFR BLD AUTO: 9 % (ref 0–6)
ERYTHROCYTE [DISTWIDTH] IN BLOOD BY AUTOMATED COUNT: 13.1 % (ref 11.6–15.1)
GFR SERPL CREATININE-BSD FRML MDRD: 70 ML/MIN/1.73SQ M
GLUCOSE SERPL-MCNC: 145 MG/DL (ref 65–140)
HCT VFR BLD AUTO: 38.2 % (ref 36.5–49.3)
HGB BLD-MCNC: 11 G/DL (ref 12–17)
IMM GRANULOCYTES # BLD AUTO: 0.03 THOUSAND/UL (ref 0–0.2)
IMM GRANULOCYTES NFR BLD AUTO: 0 % (ref 0–2)
LYMPHOCYTES # BLD AUTO: 0.79 THOUSANDS/ΜL (ref 0.6–4.47)
LYMPHOCYTES NFR BLD AUTO: 8 % (ref 14–44)
MCH RBC QN AUTO: 31.5 PG (ref 26.8–34.3)
MCHC RBC AUTO-ENTMCNC: 28.8 G/DL (ref 31.4–37.4)
MCV RBC AUTO: 110 FL (ref 82–98)
MONOCYTES # BLD AUTO: 1.13 THOUSAND/ΜL (ref 0.17–1.22)
MONOCYTES NFR BLD AUTO: 12 % (ref 4–12)
NEUTROPHILS # BLD AUTO: 6.69 THOUSANDS/ΜL (ref 1.85–7.62)
NEUTS SEG NFR BLD AUTO: 71 % (ref 43–75)
NRBC BLD AUTO-RTO: 0 /100 WBCS
PLATELET # BLD AUTO: 117 THOUSANDS/UL (ref 149–390)
PMV BLD AUTO: 9 FL (ref 8.9–12.7)
POTASSIUM SERPL-SCNC: 4.1 MMOL/L (ref 3.5–5.3)
PROT SERPL-MCNC: 6.9 G/DL (ref 6.4–8.2)
RBC # BLD AUTO: 3.49 MILLION/UL (ref 3.88–5.62)
SODIUM SERPL-SCNC: 145 MMOL/L (ref 136–145)
WBC # BLD AUTO: 9.51 THOUSAND/UL (ref 4.31–10.16)

## 2021-08-08 PROCEDURE — 99232 SBSQ HOSP IP/OBS MODERATE 35: CPT | Performed by: FAMILY MEDICINE

## 2021-08-08 PROCEDURE — 87040 BLOOD CULTURE FOR BACTERIA: CPT | Performed by: FAMILY MEDICINE

## 2021-08-08 PROCEDURE — 71045 X-RAY EXAM CHEST 1 VIEW: CPT

## 2021-08-08 PROCEDURE — 80053 COMPREHEN METABOLIC PANEL: CPT | Performed by: FAMILY MEDICINE

## 2021-08-08 PROCEDURE — 94640 AIRWAY INHALATION TREATMENT: CPT

## 2021-08-08 PROCEDURE — 85025 COMPLETE CBC W/AUTO DIFF WBC: CPT | Performed by: FAMILY MEDICINE

## 2021-08-08 RX ADMIN — PANTOPRAZOLE SODIUM 40 MG: 40 TABLET, DELAYED RELEASE ORAL at 06:44

## 2021-08-08 RX ADMIN — METOPROLOL TARTRATE 50 MG: 50 TABLET, FILM COATED ORAL at 08:28

## 2021-08-08 RX ADMIN — Medication 125 MG: at 17:27

## 2021-08-08 RX ADMIN — ACETAMINOPHEN 650 MG: 325 TABLET, FILM COATED ORAL at 08:27

## 2021-08-08 RX ADMIN — Medication 125 MG: at 14:08

## 2021-08-08 RX ADMIN — METOPROLOL TARTRATE 50 MG: 50 TABLET, FILM COATED ORAL at 20:55

## 2021-08-08 RX ADMIN — BUDESONIDE AND FORMOTEROL FUMARATE DIHYDRATE 2 PUFF: 160; 4.5 AEROSOL RESPIRATORY (INHALATION) at 08:28

## 2021-08-08 RX ADMIN — ATORVASTATIN CALCIUM 10 MG: 10 TABLET, FILM COATED ORAL at 17:27

## 2021-08-08 RX ADMIN — Medication 125 MG: at 00:22

## 2021-08-08 RX ADMIN — AMLODIPINE BESYLATE 10 MG: 10 TABLET ORAL at 08:27

## 2021-08-08 RX ADMIN — TIOTROPIUM BROMIDE 18 MCG: 18 CAPSULE ORAL; RESPIRATORY (INHALATION) at 08:28

## 2021-08-08 RX ADMIN — ALBUTEROL SULFATE 2.5 MG: 2.5 SOLUTION RESPIRATORY (INHALATION) at 21:16

## 2021-08-08 RX ADMIN — Medication 125 MG: at 06:44

## 2021-08-08 NOTE — ASSESSMENT & PLAN NOTE
· Blood pressure is elevated on presentation 180s/100s   · Hypotensive overnight 8/8/21  · Continue home regimen with Lopressor 50 mg daily,  · Hold losartan and Norvasc for now  · Monitor blood pressure closely

## 2021-08-08 NOTE — ASSESSMENT & PLAN NOTE
POA:  Bloody bowel movement, no associated dizziness, lightheadedness, nausea, vomiting, diarrhea or abdominal pain  Rectal bleeding likely secondary to diverticular bleed, proctitis, patient is now noted for C diff colitis possibly contributing to rectal bleed  Plan for outpatient colonoscopy unless significant drop in hemoglobin requiring urgent intervention  Patient currently is hemodynamically stable  Hemoglobin mildly trending down, continue to monitor closely  Recent Labs     08/07/21  0751 08/07/21  2200 08/08/21  1036   HGB 11 7* 11 8* 11 0*     Digital exam did reveal external hemorrhoids  CT abdomen/pelvis showed proctitis, 4 6 cm subcutaneous enhancing rim which could be representing abdominal mesh given the patient history of hernia repair  Previous CT 5/29/20 showed severe diverticular disease throughout the large intestine  Patient denied previous endoscopy or colonoscopy  Patient further denied change of bowel habits, weight loss, family history of colon cancer  Monitor H&H and hemodynamics    Transfuse if hemoglobin less than 7   GI input appreciated

## 2021-08-08 NOTE — ASSESSMENT & PLAN NOTE
· Platelet count 570 on presentation, patient has chronic mild thrombocytopenia currently around baseline    · Slightly below baseline and stable at 117 today  · Continue to monitor CBC

## 2021-08-08 NOTE — ASSESSMENT & PLAN NOTE
Noted today, 08/08/2021  Does not meet SIRS criteria  Possibly due to C diff infection, however, no diarrhea for 2 days  - will repeat blood cultures  - obtain chest x-ray

## 2021-08-08 NOTE — PROGRESS NOTES
[address] [facility]  Progress Note Davide Steen [birth year], 82 y o  male MRN: [id number]  [facility] [address] 203-01 Encounter: [id number]  Primary Care Provider: Diego Sepulveda MD   Date and time admitted to hospital: 8/5/2021  5:26 PM    * Rectal bleeding  Assessment & Plan  POA:  Bloody bowel movement, no associated dizziness, lightheadedness, nausea, vomiting, diarrhea or abdominal pain  Rectal bleeding likely secondary to diverticular bleed, proctitis, patient is now noted for C diff colitis possibly contributing to rectal bleed  Plan for outpatient colonoscopy unless significant drop in hemoglobin requiring urgent intervention  Patient currently is hemodynamically stable  Hemoglobin mildly trending down, continue to monitor closely  Recent Labs     08/07/21  0751 08/07/21  2200 08/08/21  1036   HGB 11 7* 11 8* 11 0*     Digital exam did reveal external hemorrhoids  CT abdomen/pelvis showed proctitis, 4 6 cm subcutaneous enhancing rim which could be representing abdominal mesh given the patient history of hernia repair  Previous CT 5/29/20 showed severe diverticular disease throughout the large intestine  Patient denied previous endoscopy or colonoscopy  Patient further denied change of bowel habits, weight loss, family history of colon cancer  Monitor H&H and hemodynamics    Transfuse if hemoglobin less than 7   GI input appreciated       Fever due to infection  Assessment & Plan  Noted today, 08/08/2021  Does not meet SIRS criteria  Possibly due to C diff infection, however, no diarrhea for 2 days  - will repeat blood cultures  - obtain chest x-ray    Clostridium difficile infection  Assessment & Plan  Stool PCR positive for C diff, negative EIA - consistent with C diff colonization rather than acute infection  As above, due to symptoms proceed with treatment with p o  vancomycin for 10 days per GI recommendations    Proctitis  Assessment & Plan  As evidenced by CT of the abdomen, showed wall thickening of the rectum, likely secondary to proctitis  Patient did not complain of abdominal pain, nausea, or vomiting  No leukocytosis, and he is afebrile  C diff testing positive on PCR, negative on EIA, this usually indicates colonization but given patient is symptomatic GI in agreement to complete 10 day course of p o  vancomycin  - Due to an episode of fever 8/8 blood cultures obtained     Hyperlipidemia  Assessment & Plan  · Continue statin    Thrombocytopenia (HCC)  Assessment & Plan  · Platelet count 695 on presentation, patient has chronic mild thrombocytopenia currently around baseline  · Slightly below baseline and stable at 117 today  · Continue to monitor CBC    Chronic respiratory failure with hypoxia ([facility])  Assessment & Plan  In the setting of COPD, on supplemental O2 2L NC at baseline   Due to episode of fever will obtain chest x-ray    Benign essential hypertension  Assessment & Plan  · Blood pressure is elevated on presentation 180s/100s   · Hypotensive overnight 8/8/21  · Continue home regimen with Lopressor 50 mg daily,  · Hold losartan and Norvasc for now  · Monitor blood pressure closely    COPD (chronic obstructive pulmonary disease) ([facility])  Assessment & Plan  · On chronic supplemental oxygen at 2 L at home, currently saturating mid 90s on 2 L  · There is no signs or symptoms of exacerbation on exam  · Continue home regimen with Symbicort, Spiriva and albuterol  · Continue to monitor respiratory status  · Respiratory protocol      VTE Pharmacologic Prophylaxis:   Pharmacologic: none, GI bleed   Mechanical VTE Prophylaxis in Place: Yes    Patient Centered Rounds: I have performed bedside rounds with nursing staff today  Discussions with Specialists or Other Care Team Provider: GI    Education and Discussions with Family / Patient:  Patient's wife at bedside    Time Spent for Care: 30 minutes    More than 50% of total time spent on counseling and coordination of care as described above  Current Length of Stay: 3 day(s)    Current Patient Status: Inpatient   Certification Statement: The patient will continue to require additional inpatient hospital stay due to infectious workup    Discharge Plan: TBD    Code Status: Level 1 - Full Code      Subjective:   Patient seen and examined  He reports generalized weakness and limited appetite  No recurrence of diarrhea since initiation of vancomycin for 2 days, in fact no bowel movement for 2 days  Fever this morning  Denies shortness of breath cough, denies pain anywhere  Objective:     Vitals:   Temp (24hrs), Av 8 °F (37 7 °C), Min:98 5 °F (36 9 °C), Max:101 4 °F (38 6 °C)    Temp:  [98 5 °F (36 9 °C)-101 4 °F (38 6 °C)] 101 4 °F (38 6 °C)  HR:  [56-82] 82  Resp:  [17-20] 17  BP: ()/(47-66) 149/66  SpO2:  [95 %-99 %] 99 %  Body mass index is 26 62 kg/m²  Input and Output Summary (last 24 hours): Intake/Output Summary (Last 24 hours) at 2021 1333  Last data filed at 2021 0900  Gross per 24 hour   Intake 240 ml   Output --   Net 240 ml       Physical Exam:     Physical Exam  Constitutional:       General: He is not in acute distress  HENT:      Head: Normocephalic and atraumatic  Nose: No congestion  Mouth/Throat:      Pharynx: Oropharynx is clear  Eyes:      Conjunctiva/sclera: Conjunctivae normal    Cardiovascular:      Rate and Rhythm: Normal rate and regular rhythm  Heart sounds: No murmur heard  Pulmonary:      Effort: No respiratory distress  Breath sounds: No wheezing or rales  Abdominal:      General: Bowel sounds are normal  There is no distension  Palpations: Abdomen is soft  Tenderness: There is no abdominal tenderness  There is no guarding  Musculoskeletal:      Right lower leg: No edema  Left lower leg: No edema  Skin:     General: Skin is warm and dry  Neurological:      Mental Status: He is oriented to person, place, and time  Psychiatric:         Mood and Affect: Mood normal          Additional Data:     Labs:    Results from last 7 days   Lab Units 08/08/21  1036 08/05/21  1233 08/05/21  0934   WBC Thousand/uL 9 51  --  6 80   HEMOGLOBIN g/dL 11 0*  --  15 3   HEMATOCRIT % 38 2  --  47 6   PLATELETS Thousands/uL 117*  --  144*   BANDS PCT %  --   --  6   NEUTROS PCT % 71   < >  --    LYMPHS PCT % 8*   < >  --    LYMPHO PCT %  --   --  18*   MONOS PCT % 12   < >  --    MONO PCT %  --   --  12*   EOS PCT % 9*   < > 10*    < > = values in this interval not displayed  Results from last 7 days   Lab Units 08/08/21  0530   SODIUM mmol/L 145   POTASSIUM mmol/L 4 1   CHLORIDE mmol/L 102   CO2 mmol/L 38*   BUN mg/dL 20   CREATININE mg/dL 0 99   ANION GAP mmol/L 5   CALCIUM mg/dL 8 4   ALBUMIN g/dL 3 0*   TOTAL BILIRUBIN mg/dL 0 31   ALK PHOS U/L 70   ALT U/L 17   AST U/L 25   GLUCOSE RANDOM mg/dL 145*     Results from last 7 days   Lab Units 08/05/21  0934   INR  1 03                       * I Have Reviewed All Lab Data Listed Above  * Additional Pertinent Lab Tests Reviewed:  [facility] 68 Admission Reviewed    Imaging:    Imaging Reports Reviewed Today Include: will review CXR     Recent Cultures (last 7 days):     Results from last 7 days   Lab Units 08/06/21  0959   C DIFF TOXIN B BY PCR  Positive*       Last 24 Hours Medication List:   Current Facility-Administered Medications   Medication Dose Route Frequency Provider Last Rate    acetaminophen  650 mg Oral Q6H PRN Keith Chappell MD      albuterol  2 5 mg Nebulization Q6H PRN Keith Chappell MD      atorvastatin  10 mg Oral Daily With Dinner Keith Chappell MD      budesonide-formoterol  2 puff Inhalation BID Keith Chappell MD      hydrALAZINE  5 mg Intravenous Q6H PRN Keith Chappell MD      metoprolol tartrate  50 mg Oral Q12H [address] Keith Chappell MD      pantoprazole  40 mg Oral Early Morning Keith Chappell MD  tiotropium  18 mcg Inhalation Daily Keith Grewal MD      vancomycin  125 mg Oral Q6H Keyla Quinn MD          Today, Patient Was Seen By: Juan Antonio Krause MD    ** Please Note: Dictation voice to text software may have been used in the creation of this document   **

## 2021-08-08 NOTE — ASSESSMENT & PLAN NOTE
As evidenced by CT of the abdomen, showed wall thickening of the rectum, likely secondary to proctitis  Patient did not complain of abdominal pain, nausea, or vomiting  No leukocytosis, and he is afebrile    C diff testing positive on PCR, negative on EIA, this usually indicates colonization but given patient is symptomatic GI in agreement to complete 10 day course of p o  vancomycin  - Due to an episode of fever 8/8 blood cultures obtained

## 2021-08-08 NOTE — PLAN OF CARE
Problem: MOBILITY - ADULT  Goal: Maintain or return to baseline ADL function  Description: INTERVENTIONS:  -  Assess patient's ability to carry out ADLs; assess patient's baseline for ADL function and identify physical deficits which impact ability to perform ADLs (bathing, care of mouth/teeth, toileting, grooming, dressing, etc )  - Assess/evaluate cause of self-care deficits   - Assess range of motion  - Assess patient's mobility; develop plan if impaired  - Assess patient's need for assistive devices and provide as appropriate  - Encourage maximum independence but intervene and supervise when necessary  - Involve family in performance of ADLs  - Assess for home care needs following discharge   - Consider OT consult to assist with ADL evaluation and planning for discharge  - Provide patient education as appropriate  Outcome: Progressing  Goal: Maintains/Returns to pre admission functional level  Description: INTERVENTIONS:  - Perform BMAT or MOVE assessment daily    - Set and communicate daily mobility goal to care team and patient/family/caregiver     - Collaborate with rehabilitation services on mobility goals if consulted  - Dangle patient 2 times a day  - Stand patient 3 times a day  - Ambulate patient 3 times a day  - Out of bed to chair 3 times a day   - Out of bed for meals 3 times a day  - Out of bed for toileting  - Record patient progress and toleration of activity level   Outcome: Progressing     Problem: Potential for Falls  Goal: Patient will remain free of falls  Description: INTERVENTIONS:  - Educate patient/family on patient safety including physical limitations  - Instruct patient to call for assistance with activity   - Consult OT/PT to assist with strengthening/mobility   - Keep Call bell within reach  - Keep bed low and locked with side rails adjusted as appropriate  - Keep care items and personal belongings within reach  - Initiate and maintain comfort rounds  - Make Fall Risk Sign visible to staff  - Obtain necessary fall risk management equipment  - Apply yellow socks and bracelet for high fall risk patients  - Consider moving patient to room near nurses station  Outcome: Progressing     Problem: GASTROINTESTINAL - ADULT  Goal: Minimal or absence of nausea and/or vomiting  Description: INTERVENTIONS:  - Administer IV fluids if ordered to ensure adequate hydration  - Maintain NPO status until nausea and vomiting are resolved  - Nasogastric tube if ordered  - Administer ordered antiemetic medications as needed  - Provide nonpharmacologic comfort measures as appropriate  - Advance diet as tolerated, if ordered  - Consider nutrition services referral to assist patient with adequate nutrition and appropriate food choices  Outcome: Progressing  Goal: Maintains or returns to baseline bowel function  Description: INTERVENTIONS:  - Assess bowel function  - Encourage oral fluids to ensure adequate hydration  - Administer IV fluids if ordered to ensure adequate hydration  - Administer ordered medications as needed  - Encourage mobilization and activity  - Consider nutritional services referral to assist patient with adequate nutrition and appropriate food choices  Outcome: Progressing  Goal: Maintains adequate nutritional intake  Description: INTERVENTIONS:  - Monitor percentage of each meal consumed  - Identify factors contributing to decreased intake, treat as appropriate  - Assist with meals as needed  - Monitor I&O, weight, and lab values if indicated  - Obtain nutrition services referral as needed  Outcome: Progressing     Problem: HEMATOLOGIC - ADULT  Goal: Maintains hematologic stability  Description: INTERVENTIONS  - Assess for signs and symptoms of bleeding or hemorrhage  - Monitor labs  - Administer supportive blood products/factors as ordered and appropriate  Outcome: Progressing

## 2021-08-08 NOTE — ASSESSMENT & PLAN NOTE
In the setting of COPD, on supplemental O2 2L NC at baseline   Due to episode of fever will obtain chest x-ray

## 2021-08-09 LAB
ALBUMIN SERPL BCP-MCNC: 2.9 G/DL (ref 3.5–5)
ALP SERPL-CCNC: 63 U/L (ref 46–116)
ALT SERPL W P-5'-P-CCNC: 28 U/L (ref 12–78)
ANION GAP SERPL CALCULATED.3IONS-SCNC: 1 MMOL/L (ref 4–13)
AST SERPL W P-5'-P-CCNC: 32 U/L (ref 5–45)
BASOPHILS # BLD AUTO: 0.03 THOUSANDS/ΜL (ref 0–0.1)
BASOPHILS NFR BLD AUTO: 0 % (ref 0–1)
BILIRUB SERPL-MCNC: 0.55 MG/DL (ref 0.2–1)
BUN SERPL-MCNC: 18 MG/DL (ref 5–25)
CALCIUM ALBUM COR SERPL-MCNC: 9.8 MG/DL (ref 8.3–10.1)
CALCIUM SERPL-MCNC: 8.9 MG/DL (ref 8.3–10.1)
CHLORIDE SERPL-SCNC: 102 MMOL/L (ref 100–108)
CO2 SERPL-SCNC: 43 MMOL/L (ref 21–32)
CREAT SERPL-MCNC: 0.83 MG/DL (ref 0.6–1.3)
EOSINOPHIL # BLD AUTO: 0.57 THOUSAND/ΜL (ref 0–0.61)
EOSINOPHIL NFR BLD AUTO: 6 % (ref 0–6)
ERYTHROCYTE [DISTWIDTH] IN BLOOD BY AUTOMATED COUNT: 13 % (ref 11.6–15.1)
GFR SERPL CREATININE-BSD FRML MDRD: 81 ML/MIN/1.73SQ M
GLUCOSE SERPL-MCNC: 105 MG/DL (ref 65–140)
HCT VFR BLD AUTO: 39.6 % (ref 36.5–49.3)
HGB BLD-MCNC: 11.2 G/DL (ref 12–17)
IMM GRANULOCYTES # BLD AUTO: 0.02 THOUSAND/UL (ref 0–0.2)
IMM GRANULOCYTES NFR BLD AUTO: 0 % (ref 0–2)
LYMPHOCYTES # BLD AUTO: 1.57 THOUSANDS/ΜL (ref 0.6–4.47)
LYMPHOCYTES NFR BLD AUTO: 17 % (ref 14–44)
MCH RBC QN AUTO: 30.9 PG (ref 26.8–34.3)
MCHC RBC AUTO-ENTMCNC: 28.3 G/DL (ref 31.4–37.4)
MCV RBC AUTO: 109 FL (ref 82–98)
MONOCYTES # BLD AUTO: 1.08 THOUSAND/ΜL (ref 0.17–1.22)
MONOCYTES NFR BLD AUTO: 11 % (ref 4–12)
NEUTROPHILS # BLD AUTO: 6.21 THOUSANDS/ΜL (ref 1.85–7.62)
NEUTS SEG NFR BLD AUTO: 66 % (ref 43–75)
NRBC BLD AUTO-RTO: 0 /100 WBCS
PLATELET # BLD AUTO: 117 THOUSANDS/UL (ref 149–390)
PMV BLD AUTO: 9.3 FL (ref 8.9–12.7)
POTASSIUM SERPL-SCNC: 4.3 MMOL/L (ref 3.5–5.3)
PROCALCITONIN SERPL-MCNC: <0.05 NG/ML
PROT SERPL-MCNC: 6.6 G/DL (ref 6.4–8.2)
RBC # BLD AUTO: 3.63 MILLION/UL (ref 3.88–5.62)
SODIUM SERPL-SCNC: 146 MMOL/L (ref 136–145)
WBC # BLD AUTO: 9.48 THOUSAND/UL (ref 4.31–10.16)

## 2021-08-09 PROCEDURE — 84145 PROCALCITONIN (PCT): CPT | Performed by: FAMILY MEDICINE

## 2021-08-09 PROCEDURE — 85025 COMPLETE CBC W/AUTO DIFF WBC: CPT | Performed by: FAMILY MEDICINE

## 2021-08-09 PROCEDURE — 99232 SBSQ HOSP IP/OBS MODERATE 35: CPT | Performed by: PHYSICIAN ASSISTANT

## 2021-08-09 PROCEDURE — 80053 COMPREHEN METABOLIC PANEL: CPT | Performed by: FAMILY MEDICINE

## 2021-08-09 RX ADMIN — Medication 125 MG: at 12:52

## 2021-08-09 RX ADMIN — Medication 125 MG: at 05:08

## 2021-08-09 RX ADMIN — TIOTROPIUM BROMIDE 18 MCG: 18 CAPSULE ORAL; RESPIRATORY (INHALATION) at 08:11

## 2021-08-09 RX ADMIN — BUDESONIDE AND FORMOTEROL FUMARATE DIHYDRATE 2 PUFF: 160; 4.5 AEROSOL RESPIRATORY (INHALATION) at 17:41

## 2021-08-09 RX ADMIN — PANTOPRAZOLE SODIUM 40 MG: 40 TABLET, DELAYED RELEASE ORAL at 05:08

## 2021-08-09 RX ADMIN — METOPROLOL TARTRATE 50 MG: 50 TABLET, FILM COATED ORAL at 08:11

## 2021-08-09 RX ADMIN — ATORVASTATIN CALCIUM 10 MG: 10 TABLET, FILM COATED ORAL at 16:30

## 2021-08-09 RX ADMIN — Medication 125 MG: at 00:20

## 2021-08-09 RX ADMIN — BUDESONIDE AND FORMOTEROL FUMARATE DIHYDRATE 2 PUFF: 160; 4.5 AEROSOL RESPIRATORY (INHALATION) at 08:11

## 2021-08-09 RX ADMIN — Medication 125 MG: at 17:39

## 2021-08-09 RX ADMIN — METOPROLOL TARTRATE 50 MG: 50 TABLET, FILM COATED ORAL at 20:48

## 2021-08-09 NOTE — ASSESSMENT & PLAN NOTE
Follow-up with Cardiothoracic surgery outpatient status post TEVAR in December 2018  Continue outpatient follow-up

## 2021-08-09 NOTE — ASSESSMENT & PLAN NOTE
· On chronic supplemental oxygen at 2 L at home, 3 lpm with exertion   · Very poor air movement throughout  · Follows with Dr Isabelle Estevez outpatient   · Continue home regimen with Symbicort, Spiriva and albuterol  · Continue to monitor respiratory status    · Respiratory protocol  · Follow up CXR   · Outpatient pulm rehab

## 2021-08-09 NOTE — ASSESSMENT & PLAN NOTE
· Platelet count 300 on presentation, patient has chronic mild thrombocytopenia   · Slightly below baseline but stable at 117 today  · Continue to monitor CBC

## 2021-08-09 NOTE — PROGRESS NOTES
[address] [facility]  Progress Note Vangie Villagomez [birth year], 82 y o  male MRN: [id number]  Unit/Bed#: [address] 203-01 Encounter: [id number]  Primary Care Provider: Lola Fuentes MD   Date and time admitted to hospital: 8/5/2021  5:26 PM    * Rectal bleeding  Assessment & Plan  POA:  Bloody bowel movement, originally admitted under critical care service  Rectal bleeding likely secondary to diverticular bleed, proctitis, patient is now noted for C diff colitis possibly contributing to rectal bleed  Plan for 10 day treatment with po vancomycin   Plan for outpatient colonoscopy   Patient currently is hemodynamically stable   hgb overall stable   Pending blood cultures today will likely send home tomorrow AM     Recent Labs     08/07/21  2200 08/08/21  1036 08/09/21  0545   HGB 11 8* 11 0* 11 2*     Digital exam did reveal external hemorrhoids  CT abdomen/pelvis showed proctitis, 4 6 cm subcutaneous enhancing rim which could be representing abdominal mesh given the patient history of hernia repair  Previous CT 5/29/20 showed severe diverticular disease throughout the large intestine  Patient denied previous endoscopy or colonoscopy  Patient further denied change of bowel habits, weight loss, family history of colon cancer  Monitor H&H and hemodynamics  Transfuse if hemoglobin less than 7   GI input appreciated       Clostridium difficile infection  Assessment & Plan  Stool PCR positive for C diff, negative EIA - consistent with C diff colonization rather than acute infection  As above, due to symptoms proceeded with treatment with p o  vancomycin for 10 days per GI recommendations, day 3/10   Diarrhea has resolved, formed stool today   Tolerating po intake     Proctitis  Assessment & Plan  As evidenced by CT of the abdomen, showed wall thickening of the rectum, likely secondary to proctitis  Patient did not complain of abdominal pain, nausea, or vomiting    C diff testing positive on PCR, negative on EIA, this usually indicates colonization but given patient is symptomatic GI in agreement to complete 10 day course of p o  vancomycin  - Due to an episode of fever 8/8 blood cultures obtained which are pending      Hyperlipidemia  Assessment & Plan  · Continue statin    Thrombocytopenia (HCC)  Assessment & Plan  · Platelet count 856 on presentation, patient has chronic mild thrombocytopenia   · Slightly below baseline but stable at 117 today  · Continue to monitor CBC    Chronic respiratory failure with hypoxia (HCC)  Assessment & Plan  In the setting of COPD, on supplemental O2 at baseline       Benign essential hypertension  Assessment & Plan  · Blood pressure elevated on presentation 180s/100s   · Hypotensive overnight 8/8/21  · Continue home regimen with Lopressor 50 mg daily,  · Hold losartan and Norvasc for now  · Monitor blood pressure closely  · Blood pressure currently 128/54     Descending aortic aneurysm [facility])  Assessment & Plan  Follow-up with Cardiothoracic surgery outpatient status post TEVAR in December 2018  Continue outpatient follow-up    COPD (chronic obstructive pulmonary disease) ([facility] [address] )  Assessment & Plan  · On chronic supplemental oxygen at 2 L at home, 3 lpm with exertion   · Very poor air movement throughout  · Follows with Dr Wing Nielson outpatient   · Continue home regimen with Symbicort, Spiriva and albuterol  · Continue to monitor respiratory status  · Respiratory protocol  · Follow up CXR   · Outpatient pulm rehab       VTE Pharmacologic Prophylaxis:   Pharmacologic: Pharmacologic VTE Prophylaxis contraindicated due to rectabl bleeding, thrombocytopenia  Mechanical VTE Prophylaxis in Place: Yes    Patient Centered Rounds: I have performed bedside rounds with nursing staff today      Discussions with Specialists or Other Care Team Provider:  Case management    Education and Discussions with Family / Patient:  Patient and wife at bedside    Time Spent for Care: 27 minutes  More than 50% of total time spent on counseling and coordination of care as described above  Current Length of Stay: 4 day(s)    Current Patient Status: Inpatient   Certification Statement: The patient will continue to require additional inpatient hospital stay due to Rectal bleeding, fevers    Discharge Plan:  Pending afebrile times 24 hours and blood cultures negative possibly home tomorrow    Code Status: Level 1 - Full Code      Subjective:   Patient is resting on side of bed with his wife  He had a bowel movement this morning that was solid and nonbloody  No abdominal pain nausea or vomiting  No fevers or chills  No chest pain  He has chronic shortness of breath that is unchanged  He is tolerating p o  Intake    Objective:     Vitals:   Temp (24hrs), Av 2 °F (37 3 °C), Min:98 2 °F (36 8 °C), Max:100 3 °F (37 9 °C)    Temp:  [98 2 °F (36 8 °C)-100 3 °F (37 9 °C)] 100 3 °F (37 9 °C)  HR:  [56-71] 71  Resp:  [17-18] 17  BP: ()/(49-57) 128/54  SpO2:  [93 %-98 %] 96 %  Body mass index is 26 62 kg/m²  Input and Output Summary (last 24 hours): Intake/Output Summary (Last 24 hours) at 2021 0933  Last data filed at 2021 1300  Gross per 24 hour   Intake 360 ml   Output --   Net 360 ml       Physical Exam:     Physical Exam  Vitals and nursing note reviewed  HENT:      Head: Normocephalic  Eyes:      Conjunctiva/sclera: Conjunctivae normal    Cardiovascular:      Rate and Rhythm: Normal rate and regular rhythm  Pulmonary:      Effort: Pulmonary effort is normal  No respiratory distress  Comments: Significantly decreased breath sounds throughout on 4 lpm nasal cannula , speaking in full sentences  Abdominal:      General: Bowel sounds are normal       Palpations: Abdomen is soft  Tenderness: There is no abdominal tenderness  There is no guarding or rebound  Musculoskeletal:      Right lower leg: No edema  Left lower leg: No edema     Skin:     General: Skin is warm  Neurological:      Mental Status: He is alert  Mental status is at baseline  Psychiatric:         Mood and Affect: Mood normal          Additional Data:     Labs:    Results from last 7 days   Lab Units 08/09/21  0545 08/05/21  1233 08/05/21  0934   WBC Thousand/uL 9 48  --  6 80   HEMOGLOBIN g/dL 11 2*  --  15 3   HEMATOCRIT % 39 6  --  47 6   PLATELETS Thousands/uL 117*  --  144*   BANDS PCT %  --   --  6   NEUTROS PCT % 66   < >  --    LYMPHS PCT % 17   < >  --    LYMPHO PCT %  --   --  18*   MONOS PCT % 11   < >  --    MONO PCT %  --   --  12*   EOS PCT % 6   < > 10*    < > = values in this interval not displayed  Results from last 7 days   Lab Units 08/09/21  0545   SODIUM mmol/L 146*   POTASSIUM mmol/L 4 3   CHLORIDE mmol/L 102   CO2 mmol/L 43*   BUN mg/dL 18   CREATININE mg/dL 0 83   ANION GAP mmol/L 1*   CALCIUM mg/dL 8 9   ALBUMIN g/dL 2 9*   TOTAL BILIRUBIN mg/dL 0 55   ALK PHOS U/L 63   ALT U/L 28   AST U/L 32   GLUCOSE RANDOM mg/dL 105     Results from last 7 days   Lab Units 08/05/21  0934   INR  1 03                       * I Have Reviewed All Lab Data Listed Above  * Additional Pertinent Lab Tests Reviewed: All Labs Within Last 24 Hours Reviewed    Imaging:    Imaging Reports Reviewed Today Include: pending CXR  Imaging Personally Reviewed by Myself Includes:      Recent Cultures (last 7 days):     Results from last 7 days   Lab Units 08/08/21  1036 08/08/21  1035 08/06/21  0959   BLOOD CULTURE  Received in Microbiology Lab  Culture in Progress  Received in Microbiology Lab  Culture in Progress    --    C DIFF TOXIN B BY PCR   --   --  Positive*       Last 24 Hours Medication List:   Current Facility-Administered Medications   Medication Dose Route Frequency Provider Last Rate    acetaminophen  650 mg Oral Q6H PRN Keith Bowser MD      albuterol  2 5 mg Nebulization Q6H PRN Keith Bowser MD      atorvastatin  10 mg Oral Daily With Dinner Keith Bowser MD      budesonide-formoterol  2 puff Inhalation BID Keith Bearden MD      hydrALAZINE  5 mg Intravenous Q6H PRN Keith Bearden MD      metoprolol tartrate  50 mg Oral Q12H [facility] Keith Bearden MD      pantoprazole  40 mg Oral Early Morning Keith Bearden MD      tiotropium  18 mcg Inhalation Daily Keith Bearden MD      vancomycin  125 mg Oral Q6H eNdra Hameed MD          Today, Patient Was Seen By: Dennis Hummel PA-C    ** Please Note: Dictation voice to text software may have been used in the creation of this document   **

## 2021-08-09 NOTE — ASSESSMENT & PLAN NOTE
Stool PCR positive for C diff, negative EIA - consistent with C diff colonization rather than acute infection  As above, due to symptoms proceeded with treatment with p o  vancomycin for 10 days per GI recommendations, day 3/10   Diarrhea has resolved, formed stool today   Tolerating po intake

## 2021-08-09 NOTE — ASSESSMENT & PLAN NOTE
As evidenced by CT of the abdomen, showed wall thickening of the rectum, likely secondary to proctitis  Patient did not complain of abdominal pain, nausea, or vomiting    C diff testing positive on PCR, negative on EIA, this usually indicates colonization but given patient is symptomatic GI in agreement to complete 10 day course of p o  vancomycin  - Due to an episode of fever 8/8 blood cultures obtained which are pending

## 2021-08-09 NOTE — ASSESSMENT & PLAN NOTE
POA:  Bloody bowel movement, originally admitted under critical care service  Rectal bleeding likely secondary to diverticular bleed, proctitis, patient is now noted for C diff colitis possibly contributing to rectal bleed  Plan for 10 day treatment with po vancomycin   Plan for outpatient colonoscopy   Patient currently is hemodynamically stable   hgb overall stable   Pending blood cultures today will likely send home tomorrow AM     Recent Labs     08/07/21  2200 08/08/21  1036 08/09/21  0545   HGB 11 8* 11 0* 11 2*     Digital exam did reveal external hemorrhoids  CT abdomen/pelvis showed proctitis, 4 6 cm subcutaneous enhancing rim which could be representing abdominal mesh given the patient history of hernia repair  Previous CT 5/29/20 showed severe diverticular disease throughout the large intestine  Patient denied previous endoscopy or colonoscopy  Patient further denied change of bowel habits, weight loss, family history of colon cancer  Monitor H&H and hemodynamics    Transfuse if hemoglobin less than 7   GI input appreciated

## 2021-08-09 NOTE — ASSESSMENT & PLAN NOTE
· Blood pressure elevated on presentation 180s/100s   · Hypotensive overnight 8/8/21  · Continue home regimen with Lopressor 50 mg daily,  · Hold losartan and Norvasc for now  · Monitor blood pressure closely  · Blood pressure currently 128/54

## 2021-08-10 VITALS
RESPIRATION RATE: 18 BRPM | TEMPERATURE: 99.3 F | SYSTOLIC BLOOD PRESSURE: 117 MMHG | WEIGHT: 140.87 LBS | DIASTOLIC BLOOD PRESSURE: 68 MMHG | HEIGHT: 61 IN | BODY MASS INDEX: 26.6 KG/M2 | HEART RATE: 64 BPM | OXYGEN SATURATION: 96 %

## 2021-08-10 LAB
CALPROTECTIN STL-MCNT: 453 UG/G (ref 0–120)
PROCALCITONIN SERPL-MCNC: <0.05 NG/ML

## 2021-08-10 PROCEDURE — 99239 HOSP IP/OBS DSCHRG MGMT >30: CPT | Performed by: PHYSICIAN ASSISTANT

## 2021-08-10 PROCEDURE — 84145 PROCALCITONIN (PCT): CPT | Performed by: FAMILY MEDICINE

## 2021-08-10 RX ORDER — BENZONATATE 100 MG/1
100 CAPSULE ORAL 3 TIMES DAILY PRN
Status: DISCONTINUED | OUTPATIENT
Start: 2021-08-10 | End: 2021-08-10 | Stop reason: HOSPADM

## 2021-08-10 RX ORDER — AMLODIPINE BESYLATE 10 MG/1
5 TABLET ORAL DAILY
Qty: 30 TABLET | Refills: 0 | Status: SHIPPED | OUTPATIENT
Start: 2021-08-10 | End: 2022-05-04 | Stop reason: CLARIF

## 2021-08-10 RX ADMIN — Medication 125 MG: at 12:02

## 2021-08-10 RX ADMIN — METOPROLOL TARTRATE 50 MG: 50 TABLET, FILM COATED ORAL at 08:13

## 2021-08-10 RX ADMIN — BUDESONIDE AND FORMOTEROL FUMARATE DIHYDRATE 2 PUFF: 160; 4.5 AEROSOL RESPIRATORY (INHALATION) at 08:15

## 2021-08-10 RX ADMIN — Medication 125 MG: at 06:05

## 2021-08-10 RX ADMIN — PANTOPRAZOLE SODIUM 40 MG: 40 TABLET, DELAYED RELEASE ORAL at 06:05

## 2021-08-10 RX ADMIN — BENZONATATE 100 MG: 100 CAPSULE ORAL at 06:41

## 2021-08-10 RX ADMIN — Medication 125 MG: at 00:34

## 2021-08-10 RX ADMIN — TIOTROPIUM BROMIDE 18 MCG: 18 CAPSULE ORAL; RESPIRATORY (INHALATION) at 08:13

## 2021-08-10 NOTE — DISCHARGE INSTRUCTIONS
Infección por C  Diff (Clostridioides Difficile)   LO QUE NECESITA SABER:   Clostridioides difficile, Clostridium difficile o C  diff es js bacteria que causa diarrea, irritación e inflamación del colon  El uso de antibióticos es la causa más frecuente de CDI  Las evacuaciones intestinales de sj persona con CDI contienen C  diff  Las personas infectadas que no se lavan las marilynn adecuadamente después de tener evacuaciones intestinales pueden propagar las C  diff  Las bacterias pueden vivir mucho tiempo en las superficies que toca, flako las encimeras  INSTRUCCIONES SOBRE EL [facility]ITAL[facility]:   Llame al número de emergencias local (911 en los [place]) si:  · Usted tiene fiebre o calambres estomacales que se empeoran o no desaparecen  · Rosa abdomen está yaw o se siente hinchado  · Tiene evacuaciones intestinales de color eve o hughes brillante  · Usted vomita jeanette  · Le falta el aire o siente que se va desmayar  · Usted tiene alguno de los siguientes síntomas de deshidratación:    ? Mareos o debilidad, o somnolencia extrema    ? Boca seca, labios partidos, o tiene mucha sed    ? Palpitaciones aceleradas o respiración rápida    ? Orina muy poco o no orina en absoluto    ? Ojos hundidos    Llame a rosa médico si:  · Tiene fiebre  · Florin signos y síntomas empeoran  · Florin síntomas y signos no desaparecen, o regresan, incluso después del tratamiento  · Usted no puede comer ni beber nada  · Usted tiene preguntas o inquietudes acerca de rosa condición o cuidado  Medicamentos:  · Los antibióticos pueden administrarse para evitar que crezcan las bacterias C  diff  · [place] florin medicamentos flako se le haya indicado  Consulte con rosa médico si usted ai que rosa medicamento no le está ayudando o si presenta efectos secundarios  Infórmele si es alérgico a cualquier medicamento  Mantenga sj lista actualizada de los [place], las vitaminas y los productos herbales que hanh   [place] siguientes datos de los medicamentos: cantidad, frecuencia y motivo de administración  Traiga con usted la lista o los envases de las píldoras a florin citas de seguimiento  Lleve la lista de los medicamentos con usted en michi de sj emergencia  Qué puede hacer para manejar o prevenir la CDI:  · Lávese las marilynn frecuentemente  Lávese las marilynn varias veces al día  Lávese después de usar el baño, después de cambiar pañales y antes de preparar la comida o comer  Use siempre agua y Sassafras  Frótese las marilynn enjabonadas, [facility] dedos  Lávese el frente y el dorso de las [place], y Jan dedos  Use los dedos de sj mano para restregar debajo de las uñas de la [place]  Lávese ramesh al menos 20 segundos  Enjuague con agua corriente caliente ramesh varios segundos  Luego séquese las marilynn con sj toalla limpia o sj toalla de papel  Puede usar un desinfectante para marilynn que contenga alcohol, si no hay agua y jabón disponibles  No se toque los ojos, la nariz o la boca sin antes [employer]  · Limpie las superficies con frecuencia  Limpie las perillas de las cecilia, los muebles de la cocina, teléfonos celulares y otras superficies que la gente toca con frecuencia  Use sj toallita desinfectante, [place] de un solo uso o un paño que Anders Choudhury y reutilizar  Use limpiadores desinfectantes si no tiene toallitas  Usted también puede elaborar un limpiador desinfectante mezclando 1 parte de blanqueador con 10 partes de agua  · Evite la propagación de las C  diff  No comparta ningún artículo con [employer]  Use cuantos artículos desechables le sea posible, flako platos de papel  Dhiraj esto hasta que ya no tenga diarrea  · Pregunte acerca de los probióticos  Los probióticos también se llaman bacterias buenas  Pueden ayudar a protegerlo contra las bacterias dañinas  Si desarrolla más de un CDI, los probióticos pueden ayudar a prevenir más infecciones   Consulte con rosa médico si los probióticos son adecuados para usted  Es posible que pueda comer yogur u otros alimentos ricos en probióticos  En cambio, rosa médico podría recomendarle probióticos en forma de píldora o líquido  · [place] más líquidos para evitar la deshidratación  Usted también puede samantha js solución de rehidratación oral (SRO)  Un SRO tiene las cantidades Las vagas de agua, sales y azúcares necesarias para reemplazar los líquidos corporales  Pregunte al médico dónde se compra la solución y que cantidad debe consumir  Lo que debe saber sobre el uso correcto de antibióticos:  · [place] el antibiótico flako se lo hayan indicado  No omita ninguna dosis del antibiótico  No deje de samantha el antibiótico, incluso si se siente mejor  Termine la dosis completa del antibiótico a menos que rosa médico le indique que lo suspenda  · Deseche los antibióticos que no usó  Pregúntele al Marigene Loges o al farmacéutico cómo [employer] antibióticos  No comparta el antibiótico con otra persona  No tome un antibiótico sobrante para otra enfermedad sin consultar a rosa médico     · Evite las infecciones causadas por bacterias  [place] ayudará a evitar la necesidad de un antibiótico  Pregunte sobre las vacunas que necesita  Lávese las marilynn con frecuencia para evitar la propagación de la infección  · Pregúntele a rosa médico cómo puede mantener los síntomas bajo control sin samantha antibióticos  Rosa médico puede recomendarle otros tratamientos en función de la enfermedad que padezca  Un ejemplo incluye los medicamentos de [address], flako el paracetamol o los antiinflamatorios no esteroides (Irene Prow)  Acuda a sj consulta de control con rosa médico dentro de 1 a 2 días: Es posible que necesite cambiar un antibiótico si causó rosa CDI  Anote florin preguntas para que se acuerde de hacerlas ramesh florin visitas  © Copyright [employer] 2021 Information is for End User's use only and may not be sold, redistributed or otherwise used for commercial purposes   All illustrations and images included in CareNotes® are the copyrighted property of A D A M , Inc  or [address] [place] [address] es sólo para uso en educación  Rosa intención no es darle un consejo médico sobre enfermedades o tratamientos  Colsulte con rosa Hanson Cranker farmacéutico antes de seguir cualquier régimen médico para saber si es seguro y efectivo para usted

## 2021-08-10 NOTE — ASSESSMENT & PLAN NOTE
· Platelet count 916 on presentation, patient has chronic mild thrombocytopenia   · Slightly below baseline but stable at 117   · Continue to monitor CBC outpatient after resolution of acute infection in 2 weeks

## 2021-08-10 NOTE — ASSESSMENT & PLAN NOTE
Stool PCR positive for C diff, negative EIA - consistent with C diff colonization rather than acute infection  As above, due to symptoms proceeded with treatment with p o  vancomycin for 10 days per GI recommendations  Diarrhea has resolved, formed stool   Tolerating po intake

## 2021-08-10 NOTE — ASSESSMENT & PLAN NOTE
· Blood pressure elevated on presentation 180s/100s   · Hypotensive overnight 8/8/21  · Continue home regimen with Lopressor 50 mg daily,  · Hold losartan and stop HCTZ   · continue Norvasc upon discharge  But reduced to 5 mg once daily  ·  close monitoring blood pressure with primary care provider outpatient

## 2021-08-10 NOTE — ASSESSMENT & PLAN NOTE
· On chronic supplemental oxygen at 2 L at home, 3 lpm with exertion   · Follows with Dr Hunter Age outpatient   · Continue home regimen with Symbicort, Spiriva and albuterol  · Outpatient pulm rehab as Previously advised

## 2021-08-10 NOTE — NURSING NOTE
Pt discharged home  IV removed  AVS reviewed with pt and spouse  Pt sent home on portable 02 that was brought from home  All belongings taken with pt

## 2021-08-10 NOTE — ASSESSMENT & PLAN NOTE
POA:  Bloody bowel movement, originally admitted under critical care service  Rectal bleeding likely secondary to diverticular bleed, proctitis, patient is now noted for C diff colitis possibly contributing to rectal bleed  Plan for 10 day treatment with po vancomycin   Plan for outpatient colonoscopy   Patient currently is hemodynamically stable   hgb overall stable       Recent Labs     08/07/21  2200 08/08/21  1036 08/09/21  0545   HGB 11 8* 11 0* 11 2*

## 2021-08-10 NOTE — UTILIZATION REVIEW
Continued Stay Review    Date:   8/10                          Current Patient Class: IP   Current Level of Care: ms     HPI:85 y o  male initially admitted on 8/05 to   [place]:    Admit IP status, Level 1 Stepdown    for management of  Lower GIB in setting of  Proctitis  Will trend H&H, transfuse prbc's prn, allow clear liquids, consult GI,  Given "gentle" IVF hydration,  NPO at midnoc for possible colonoscopy  Date:  8/6    Day 2:   GI CONSULT:  Nursing confirms pt had about 5 episodes of BRB IN overnight  Hemoglobin has been stable at 13 3  Would benefit from endoscopic eval but due to inflammatory changes in his colon, infectious etiology must be managed first    Send stool to lab for studies, fecal kandi and CRP to r/o IBC,  Cont trending H&H          8/10   Stool PCR positive for C diff, negative EIA - consistent with C diff colonization rather than acute infection  Treated w/po Vancomycin  Diarrhea has resolved, formed stool  Tolerating po intake  Hypotensive overnight 8/8/21 -  Stopped HCTZ and losartan     At home,  Cont Lopressor and norvasc (reduced dosage) Plan for outpatient colonoscopy        Vital Signs:   08/10/21 07:29:11  99 3 °F (37 4 °C)  64  18  117/68  84  96 %  28  2 L/min  Nasal cannula  Lying   08/09/21 21:27:41  99 9 °F (37 7 °C)  63  18  115/54  74  97 %                 Pertinent Labs/Diagnostic Results:       Results from last 7 days   Lab Units 08/09/21  0545 08/08/21  1036 08/07/21  2200 08/07/21  0751 08/06/21  2327 08/06/21  0442 08/05/21  1233 08/05/21  0934   WBC Thousand/uL 9 48 9 51  --   --   --  6 85 6 10 6 80   HEMOGLOBIN g/dL 11 2* 11 0* 11 8* 11 7* 12 5 12 9 13 8 15 3   HEMATOCRIT % 39 6 38 2  --   --   --  44 8 43 9 47 6   PLATELETS Thousands/uL 117* 117*  --   --   --  116* 132* 144*   NEUTROS ABS Thousands/µL 6 21 6 69  --   --   --  3 65  --   --    TOTAL NEUT ABS Thousand/uL  --   --   --   --   --   --   --  4 08   BANDS PCT %  --   --   --   --   -- --   --  6         Results from last 7 days   Lab Units 08/09/21  0545 08/08/21  0530 08/06/21  0442 08/05/21  1555 08/05/21  0934   SODIUM mmol/L 146* 145 145 144 142   POTASSIUM mmol/L 4 3 4 1 3 8 3 2* 4 6   CHLORIDE mmol/L 102 102 103 101 94*   CO2 mmol/L 43* 38* 42* 39* 40*   ANION GAP mmol/L 1* 5 0* 4* 8   BUN mg/dL 18 20 17 18 23   CREATININE mg/dL 0 83 0 99 0 98 0 74 0 92   EGFR ml/min/1 73sq m 81 70 71 85 77   CALCIUM mg/dL 8 9 8 4 8 5 7 1* 9 2     Results from last 7 days   Lab Units 08/09/21  0545 08/08/21  0530 08/05/21  1555 08/05/21  0934   AST U/L 32 25 26 42   ALT U/L 28 17 13 19   ALK PHOS U/L 63 70 45 59   TOTAL PROTEIN g/dL 6 6 6 9 5 6* 7 9   ALBUMIN g/dL 2 9* 3 0* 2 8* 4 0   TOTAL BILIRUBIN mg/dL 0 55 0 31 0 73 1 06         Results from last 7 days   Lab Units 08/09/21  0545 08/08/21  0530 08/06/21  0442 08/05/21  1555 08/05/21  0934   GLUCOSE RANDOM mg/dL 105 145* 93 81 133*     Results from last 7 days   Lab Units 08/05/21  0934   TROPONIN I ng/mL 0 02         Results from last 7 days   Lab Units 08/05/21  0934   PROTIME seconds 13 6   INR  1 03   PTT seconds 31         Results from last 7 days   Lab Units 08/10/21  0618 08/09/21  0545   PROCALCITONIN ng/ml <0 05 <0 05     Results from last 7 days   Lab Units 08/07/21  0513   CRP mg/L 6 0*     Results from last 7 days   Lab Units 08/06/21  0959   C DIFF TOXIN B BY PCR  Positive*     Results from last 7 days   Lab Units 08/06/21  0959   SALMONELLA SP PCR  None Detected   SHIGELLA SP/ENTEROINVASIVE E  COLI (EIEC)  None Detected   CAMPYLOBACTER SP (JEJUNI AND COLI)  None Detected   SHIGA TOXIN 1/SHIGA TOXIN 2  None Detected         Results from last 7 days   Lab Units 08/08/21  1036 08/08/21  1035   BLOOD CULTURE  No Growth at 24 hrs  No Growth at 24 hrs       Medications:   Scheduled Medications:  atorvastatin, 10 mg, Oral, Daily With Dinner  budesonide-formoterol, 2 puff, Inhalation, BID  metoprolol tartrate, 50 mg, Oral, Q12H [address]  pantoprazole, 40 mg, Oral, Early Morning  tiotropium, 18 mcg, Inhalation, Daily  vancomycin, 125 mg, Oral, Q6H [address]      Continuous IV Infusions:     PRN Meds:  acetaminophen, 650 mg, Oral, Q6H PRN  albuterol, 2 5 mg, Nebulization, Q6H PRN  benzonatate, 100 mg, Oral, TID PRN  hydrALAZINE, 5 mg, Intravenous, Q6H PRN        Discharge Plan: [facility]    Network Utilization Review Department  ATTENTION: Please call with any questions or concerns to [phone] and carefully listen to the prompts so that you are directed to the right person  All voicemails are confidential   Joshua Knapp all requests for admission clinical reviews, approved or denied determinations and any other requests to dedicated fax number below belonging to the campus where the patient is receiving treatment   List of dedicated fax numbers for the Facilities:  [address] DENIALS (Administrative/Medical Necessity) [phone]   [address] (Maternity/NICU/Pediatrics) [phone]   [address] [place] [address] [address] [place] [phone] [place] [address] Akash Heller [address] [place] [address] [phone]

## 2021-08-10 NOTE — ASSESSMENT & PLAN NOTE
As evidenced by CT of the abdomen, showed wall thickening of the rectum, likely secondary to proctitis  Patient did not complain of abdominal pain, nausea, or vomiting    C diff testing positive on PCR, negative on EIA, this usually indicates colonization but given patient is symptomatic GI in agreement to complete 10 day course of p o  vancomycin  Blood cultures negative x 24 hours

## 2021-08-10 NOTE — DISCHARGE SUMMARY
[address] [facility]  Discharge- [facility] [birth year], 82 y o  male MRN: [id number]  Unit/Bed#: [facility] 2 -01 Encounter: [id number]  Primary Care Provider: Ermelinda Crum MD   Date and time admitted to hospital: 8/5/2021  5:26 PM    * Rectal bleeding  Assessment & Plan  POA:  Bloody bowel movement, originally admitted under critical care service  Rectal bleeding likely secondary to diverticular bleed, proctitis, patient is now noted for C diff colitis possibly contributing to rectal bleed  Plan for 10 day treatment with po vancomycin   Plan for outpatient colonoscopy   Patient currently is hemodynamically stable   hgb overall stable       Recent Labs     08/07/21  2200 08/08/21  1036 08/09/21  0545   HGB 11 8* 11 0* 11 2*       Clostridium difficile infection  Assessment & Plan  Stool PCR positive for C diff, negative EIA - consistent with C diff colonization rather than acute infection  As above, due to symptoms proceeded with treatment with p o  vancomycin for 10 days per GI recommendations  Diarrhea has resolved, formed stool   Tolerating po intake     Proctitis  Assessment & Plan  As evidenced by CT of the abdomen, showed wall thickening of the rectum, likely secondary to proctitis  Patient did not complain of abdominal pain, nausea, or vomiting    C diff testing positive on PCR, negative on EIA, this usually indicates colonization but given patient is symptomatic GI in agreement to complete 10 day course of p o  vancomycin  Blood cultures negative x 24 hours     Hyperlipidemia  Assessment & Plan  · Continue statin    Thrombocytopenia (HCC)  Assessment & Plan  · Platelet count 167 on presentation, patient has chronic mild thrombocytopenia   · Slightly below baseline but stable at 117   · Continue to monitor CBC outpatient after resolution of acute infection in 2 weeks     Chronic respiratory failure with hypoxia (HCC)  Assessment & Plan  In the setting of COPD, on supplemental O2 at baseline       Benign essential hypertension  Assessment & Plan  · Blood pressure elevated on presentation 180s/100s   · Hypotensive overnight 8/8/21  · Continue home regimen with Lopressor 50 mg daily,  · Hold losartan and stop HCTZ   · continue Norvasc upon discharge  But reduced to 5 mg once daily  ·  close monitoring blood pressure with primary care provider outpatient    Descending aortic aneurysm [facility])  Assessment & Plan  Follow-up with Cardiothoracic surgery outpatient status post TEVAR in December 2018  Continue outpatient follow-up    COPD (chronic obstructive pulmonary disease) ([facility] [address] )  Assessment & Plan  · On chronic supplemental oxygen at 2 L at home, 3 lpm with exertion   · Follows with Dr Chacon outpatient   · Continue home regimen with Symbicort, Spiriva and albuterol  · Outpatient pulm rehab as Previously advised      Medical Problems     Resolved Problems  Date Reviewed: 8/10/2021    None              Discharging Physician / Practitioner: Sivakumar Jones PA-C  PCP: Yonis Love MD  Admission Date:   Admission Orders (From admission, onward)     Ordered        08/05/21 1750  Inpatient Admission  Once         08/05/21 1750  Inpatient Admission  Once                   Discharge Date: 08/10/21    Consultations During Hospital Stay:  ·  critical care  ·  gastroenterology    Procedures Performed:   · none    Significant Findings / Test Results:   ·  chest x-ray:  FINDINGS:     Normal heart size  Thoracic aortic stent graft identified      No acute infiltrates  Mild bibasilar atelectasis or scarring  No pneumothorax or pleural effusion      Osseous structures appear within normal limits for patient age      IMPRESSION:     No acute cardiopulmonary disease      · CT A/P:   FINDINGS:     ABDOMEN     LOWER CHEST:  No clinically significant abnormality identified in the visualized lower chest      LIVER/BILIARY TREE:  Unremarkable      GALLBLADDER:  There are gallstone(s) within the gallbladder, without pericholecystic inflammatory changes      SPLEEN:  Unremarkable      PANCREAS:  Unremarkable      ADRENAL GLANDS:  Unremarkable      KIDNEYS/URETERS:  Nonobstructing bilateral renal calculi measuring up to 6 mm on the right and 5 mm on the left  No hydronephrosis  One or more sharply circumscribed subcentimeter renal hypodensities are present, too small to accurately characterize,   and statistically most likely benign findings  According to recent literature (Radiology 2019) no further workup of these findings is recommended      STOMACH AND BOWEL:  Fluid within the rectum with mild diffuse upper rectal wall thickening, suggesting mild proctitis  Extensive colonic diverticulosis without acute diverticulitis  No bowel obstruction      APPENDIX:  There is a 5 mm appendicolith at the appendiceal tip  No findings to suggest acute appendicitis      ABDOMINOPELVIC CAVITY:  No ascites  No pneumoperitoneum  No lymphadenopathy      VESSELS:  Extensive Atherosclerotic changes are present  No evidence of abdominal aortic aneurysm  Partially visualized descending thoracic aortic stent      PELVIS     REPRODUCTIVE ORGANS:  Enlarged, calcified prostate      URINARY BLADDER:  Mild diffuse wall thickening and trabeculation, compatible with chronic bladder outlet obstruction      ABDOMINAL WALL/INGUINAL REGIONS:  Small right indirect inguinal hernia containing fat and a loop of nonobstructed small bowel  Small fat-containing left inguinal hernia  There is an approximately 1 3 x 0 6 x 4 6 cm subcutaneous rim-enhancing hypodense   structure extending from the right lower rectus abdominis muscle to the anterior portion of the right inguinal hernia  It is unclear if a portion of this is intramuscular or if the entire collection is in the subcutaneous tissues      OSSEOUS STRUCTURES:  No acute fracture or destructive osseous lesion    Spinal degenerative changes are noted with multilevel Schmorl's nodes, similar to 9/1/2019      IMPRESSION:     1  Proctitis      2  A 4 6 cm subcutaneous rim-enhancing hypodense structure extending from the right lower rectus abdominis muscle to the anterior aspect of a right inguinal hernia sac  Findings may represent a fluid collection or may be related to reported hernia mesh   migration  Correlate with surgical history and physical exam      3  Small right inguinal hernia containing fat and nonobstructed loop of small bowel  · Blood cultures negative x 24 hours   · Cd  iff PCR positive , toxins negative   · Stool enteric panel negative   · Giardia negative       Test Results Pending at Discharge (will require follow up): · BMP and CBC in 2 weeks   · Outpatient colonoscopy      Outpatient Tests Requested:  ·   PCP in 1 week for post hospital follow-up  ·  gastroenterology with outpatient colonoscopy    Complications:      Reason for Admission: rectal bleeding     Hospital Course:   Joshua Sanders is a 82 y o  male patient who originally presented to the hospital on 8/5/2021 due to blood bowel movements  Patient was a transfer from [facility] ED  He was originally admitted under critical service at [facility] for rectal bleeding  Fortunately, his hemoglobin remained overall stable without need for blood transfusion  He was seen in consultation with Gastroenterology  His stool enteric panel and Giardia were negative  His blood cultures were also negative  His C diff PCR was positive but toxins negative but given his bloody diarrhea recommendation was to treat with oral vancomycin for 10 day course  He tolerated advancement in diet and rectal bleeding stopped with more formed stools  Gastroenterology is recommending outpatient colonoscopy after treatment for C diff  of note, patient was noted to have episodes of hypotension    Upon discharge he will continue on his metoprolol wall however Norvasc will be reduced to 5 mg once daily and his losartan will continue to be held  Please see above list of diagnoses and related plan for additional information  Condition at Discharge: stable    Discharge Day Visit / Exam:   Subjective:    Patient feels well today  He is tolerating oral intake and good oral hydration  No abdominal pain  No fevers or chills  No chest pain or shortness of breath  Vitals: Blood Pressure: 117/68 (08/10/21 0729)  Pulse: 64 (08/10/21 0729)  Temperature: 99 3 °F (37 4 °C) (08/10/21 0729)  Temp Source: Oral (08/10/21 0729)  Respirations: 18 (08/10/21 0729)  Height: 5' 1" (154 9 cm) (08/05/21 1733)  Weight - Scale: 63 9 kg (140 lb 14 oz) (08/10/21 0600)  SpO2: 96 % (08/10/21 0729)  Exam:   Physical Exam  Vitals and nursing note reviewed  Eyes:      Conjunctiva/sclera: Conjunctivae normal    Cardiovascular:      Rate and Rhythm: Normal rate and regular rhythm  Pulmonary:      Effort: Pulmonary effort is normal  No respiratory distress  Comments: Decreased breath sounds throughout, on nasal cannula oxygen supplementation  Abdominal:      General: Bowel sounds are normal       Palpations: Abdomen is soft  Tenderness: There is no abdominal tenderness  There is no guarding or rebound  Musculoskeletal:      Right lower leg: No edema  Skin:     General: Skin is warm  Neurological:      Mental Status: He is alert  Mental status is at baseline  Psychiatric:         Mood and Affect: Mood normal             Discharge instructions/Information to patient and family:   See after visit summary for information provided to patient and family  Provisions for Follow-Up Care:  See after visit summary for information related to follow-up care and any pertinent home health orders  Disposition:   Home    Planned Readmission: none     Discharge Statement:  I spent 40 minutes discharging the patient  This time was spent on the day of discharge  I had direct contact with the patient on the day of discharge   Greater than 50% of the total time was spent examining patient, answering all patient questions, arranging and discussing plan of care with patient as well as directly providing post-discharge instructions  Additional time then spent on discharge activities  Discharge Medications:  See after visit summary for reconciled discharge medications provided to patient and/or family        **Please Note: This note may have been constructed using a voice recognition system**

## 2021-08-11 ENCOUNTER — HOSPITAL ENCOUNTER (EMERGENCY)
Facility: HOSPITAL | Age: 86
Discharge: HOME | End: 2021-08-11
Attending: EMERGENCY MEDICINE
Payer: COMMERCIAL

## 2021-08-11 VITALS
SYSTOLIC BLOOD PRESSURE: 137 MMHG | WEIGHT: 141.09 LBS | OXYGEN SATURATION: 97 % | RESPIRATION RATE: 20 BRPM | DIASTOLIC BLOOD PRESSURE: 75 MMHG | TEMPERATURE: 98.4 F | HEART RATE: 77 BPM | BODY MASS INDEX: 26.66 KG/M2

## 2021-08-11 DIAGNOSIS — R06.00 DYSPNEA: ICD-10-CM

## 2021-08-11 DIAGNOSIS — Z99.81 SUPPLEMENTAL OXYGEN DEPENDENT: Primary | ICD-10-CM

## 2021-08-11 LAB — O+P STL CONC: NORMAL

## 2021-08-11 PROCEDURE — 99282 EMERGENCY DEPT VISIT SF MDM: CPT | Performed by: PHYSICIAN ASSISTANT

## 2021-08-11 PROCEDURE — 99284 EMERGENCY DEPT VISIT MOD MDM: CPT

## 2021-08-11 NOTE — ED ATTENDING ATTESTATION
8/11/2021  I, [address] [facility], saw and evaluated the patient  I have discussed the patient with the resident/non-physician practitioner and agree with the resident's/non-physician practitioner's findings, Plan of Care, and MDM as documented in the resident's/non-physician practitioner's note, except where noted  All available labs and Radiology studies were reviewed  I was present for key portions of any procedure(s) performed by the resident/non-physician practitioner and I was immediately available to provide assistance  At this point I agree with the current assessment done in the Emergency Department      ED Course         Critical Care Time  Procedures

## 2021-08-11 NOTE — ED TRIAGE NOTES
EMS states that oxygen concentrator plug was pulled out of wall and patient felt short of breath  When plugged in, the concentrator appeared to be working

## 2021-08-11 NOTE — ED PROVIDER NOTES
History  Chief Complaint   Patient presents with    Shortness of Breath     Patient presents because of an apparent malfunction with his oxygen concentrator at home  Patient is on 2L of O2 at baseline  Machine was apparently not plugged in correctly and patient was hypoxic in high 80s  When EMS arrived, they rectified the problem by properly plugging in the machine and subsequently had good oxygen flow  Patient at this time is completely asymptomatic while on 2L O2  He was recently discharged from [facility]  Only here because of a problem with his machine  No other symptoms or complaints  Prior to Admission Medications   Prescriptions Last Dose Informant Patient Reported? Taking?    ASPIRIN 81 81 MG EC tablet   No No   Sig: TAKE ONE TABLET BY MOUTH ONCE DAILY   acetaminophen (TYLENOL) 325 mg tablet  Self No No   Sig: Take 2 tablets (650 mg total) by mouth every 6 (six) hours as needed for fever (temperature greater than 101 F)   albuterol (2 5 mg/3 mL) 0 083 % nebulizer solution  Self No No   Sig: Take 1 vial (2 5 mg total) by nebulization every 6 (six) hours as needed for wheezing or shortness of breath   albuterol (VENTOLIN HFA) 90 mcg/act inhaler  Self Yes No   Sig: Inhale 2 puffs   amLODIPine (NORVASC) 10 mg tablet   No No   Sig: Take 0 5 tablets (5 mg total) by mouth daily   atorvastatin (LIPITOR) 10 mg tablet  Self No No   Sig: Take 1 tablet (10 mg total) by mouth daily   budesonide-formoterol (SYMBICORT) 160-4 5 mcg/act inhaler  Self No No   Sig: Inhale 2 puffs 2 (two) times a day Rinse mouth after use    melatonin 3 mg  Self No No   Sig: Take 2 tablets (6 mg total) by mouth daily at bedtime   Patient not taking: Reported on 6/19/2020   metoprolol tartrate (LOPRESSOR) 50 mg tablet  Self No No   Sig: Take 1 tablet (50 mg total) by mouth every 12 (twelve) hours   pantoprazole (PROTONIX) 40 mg tablet  Self No No   Sig: Take 1 tablet (40 mg total) by mouth daily   tiotropium (SPIRIVA) 18 mcg inhalation capsule  Self No No   Sig: Place 1 capsule (18 mcg total) into inhaler and inhale daily   vancomycin (VANCOCIN) 50mg/mL SOLN   No No   Sig: Take 2 5 mL (125 mg total) by mouth every 6 (six) hours for 7 days      Facility-Administered Medications: None       Past Medical History:   Diagnosis Date    Acute metabolic encephalopathy 0/37/4941    Appendicolith     Ascending aortic aneurysm (HCC)     3 7    Asthma     BPH (benign prostatic hyperplasia)     CAD (coronary artery disease)     noted on CT scan    COPD (chronic obstructive pulmonary disease) (HCC)     Descending thoracic aortic aneurysm ([address] )     Diverticulosis     Former tobacco use     GERD (gastroesophageal reflux disease)     History of DVT (deep vein thrombosis)     Left leg    History of transfusion     Hypertension     Inguinal hernia     right    Nephrolithiasis     Oxygen dependent     2LNC    Prostate calculus     PVD (peripheral vascular disease) ([facility])     Ulcer     Varicose vein of leg     b/l       Past Surgical History:   Procedure Laterality Date    CARDIAC SURGERY      ESOPHAGOGASTRODUODENOSCOPY      HERNIA REPAIR Right 1/21/2019    Procedure: REPAIR HERNIA INGUINAL WITH MESH;  Surgeon: Tiffany Stack MD;  Location: [address] [place] [address] MAIN OR;  Service: General    INGUINAL HERNIA REPAIR Bilateral     IR TEVAR  12/27/2018    NE ENDOVASC TAA REINCL SUBCL N/A 12/27/2018    Procedure: TEVAR - endovascular thoracic aortic aneurysm repair;  Surgeon: Bryan Ortega MD;  Location:  MAIN OR;  Service: Vascular    THORACIC AORTIC ANEURYSM REPAIR  12/27/2018    VARICOSE VEIN SURGERY Bilateral     vein stripping       Family History   Problem Relation Age of Onset    Tuberculosis Mother     No Known Problems Father     Cancer Sister     Diabetes Family     Hypertension Family      I have reviewed and agree with the history as documented      E-Cigarette/Vaping    E-Cigarette Use Never User      E-Cigarette/Vaping Substances Social History     Tobacco Use    Smoking status: Former Smoker     Packs/day: 1 00     Years: 35 00     Pack years: 35 00     Start date:      Quit date:      Years since quittin 6    Smokeless tobacco: Never Used   Vaping Use    Vaping Use: Never used   Substance Use Topics    Alcohol use: Never    Drug use: No       Review of Systems   Constitutional: Negative for chills and fever  HENT: Negative for congestion, ear pain and sore throat  Eyes: Negative for pain  Respiratory: Negative for cough and shortness of breath  Cardiovascular: Negative for chest pain  Gastrointestinal: Negative for abdominal pain, nausea and vomiting  Genitourinary: Negative for dysuria  Musculoskeletal: Negative for back pain  Skin: Negative for rash  Neurological: Negative for dizziness, weakness and numbness  Psychiatric/Behavioral: Negative for suicidal ideas  All other systems reviewed and are negative  Physical Exam  Physical Exam  Vitals reviewed  Constitutional:       General: He is not in acute distress  Appearance: He is well-developed  He is not diaphoretic  HENT:      Head: Normocephalic and atraumatic  Right Ear: External ear normal       Left Ear: External ear normal       Nose: Nose normal    Eyes:      Pupils: Pupils are equal, round, and reactive to light  Cardiovascular:      Rate and Rhythm: Normal rate and regular rhythm  Heart sounds: Normal heart sounds  Pulmonary:      Effort: Pulmonary effort is normal       Breath sounds: Normal breath sounds  No decreased breath sounds, wheezing, rhonchi or rales  Abdominal:      General: Bowel sounds are normal       Palpations: Abdomen is soft  Tenderness: There is no abdominal tenderness  Musculoskeletal:         General: Normal range of motion  Cervical back: Normal range of motion and neck supple  Skin:     General: Skin is warm and dry     Neurological:      Mental Status: He is alert and oriented to person, place, and time  Vital Signs  ED Triage Vitals [08/11/21 0335]   Temperature Pulse Respirations Blood Pressure SpO2   98 4 °F (36 9 °C) 77 20 137/75 97 %      Temp Source Heart Rate Source Patient Position - Orthostatic VS BP Location FiO2 (%)   Tympanic Monitor Sitting Left arm --      Pain Score       --           Vitals:    08/11/21 0335   BP: 137/75   Pulse: 77   Patient Position - Orthostatic VS: Sitting         Visual Acuity      ED Medications  Medications - No data to display    Diagnostic Studies  Results Reviewed     None                 No orders to display              Procedures  Procedures         ED Course  ED Course as of Aug 11 0426   Wed Aug 11, 2021   9651 Plan for family member to bring in oxygen concentrator to ensure that it is working properly prior to discharging      0 Son was able to bring the oxygen concentrator to the emergency department  The machine was able to generate adequate oxygen flow  Patient's O2 saturation while on home oxygen concentrator was between 94-96%  Patient remains asymptomatic while on O2      56 Son arrived with portable oxygen and accepted patient into his care  Patient remained asymptomatic throughout stay in ED while on O2  Given strict return precautions  Patient and family agreeable                                SBIRT 20yo+      Most Recent Value   SBIRT (22 yo +)   In order to provide better care to our patients, we are screening all of our patients for alcohol and drug use  Would it be okay to ask you these screening questions? Yes Filed at: 08/11/2021 0408   Initial Alcohol Screen: US AUDIT-C    1  How often do you have a drink containing alcohol?  0 Filed at: 08/11/2021 0408   2  How many drinks containing alcohol do you have on a typical day you are drinking? 0 Filed at: 08/11/2021 0408   3a  Male UNDER 65: How often do you have five or more drinks on one occasion? 0 Filed at: 08/11/2021 0408   3b   FEMALE Any Age, or MALE 65+: How often do you have 4 or more drinks on one occassion? 0 Filed at: 08/11/2021 0408   Audit-C Score  0 Filed at: 08/11/2021 0408   ALAN: How many times in the past year have you    Used an illegal drug or used a prescription medication for non-medical reasons? Never Filed at: 08/11/2021 0408                    MDM    Disposition  Final diagnoses:   Supplemental oxygen dependent   Dyspnea     Time reflects when diagnosis was documented in both MDM as applicable and the Disposition within this note     Time User Action Codes Description Comment    8/11/2021  4:23 AM Mike Mills Add [Z99 81] Supplemental oxygen dependent     8/11/2021  4:23 AM Tay Mills Add [R09 02] Hypoxia     8/11/2021  4:26 AM Mike Mills Remove [R09 02] Hypoxia     8/11/2021  4:26 AM Mike Mills Add [R06 00] Dyspnea       ED Disposition     ED Disposition Condition Date/Time Comment    Discharge Stable Wed Aug 11, 2021  9:04 AM Aylin Banda discharge to home/self care  Follow-up Information    None         Patient's Medications   Discharge Prescriptions    No medications on file     No discharge procedures on file      PDMP Review     None          ED Provider  Electronically Signed by           Valeriy Hannon PA-C  08/11/21 0598

## 2021-08-11 NOTE — ED NOTES
Pt brought in by EMS for issue with oxygen concentrator at home  Upon arrival to ED, o2 saturation >90% on 2L via NC  Pt family at bedside and reports pt is functioning per baseline  Requested pt's son bring oxygen concentrator from home to ED to test  Pt son brought and this RN and the provider assessed function of oxygen concentrator  O2 levels remained >90% using oxygen concentrator  No issues noted with machine  Pt's son to return home to get portable oxygen for pt to discharge        Gilmer Cobb RN  08/11/21 1477

## 2021-08-13 ENCOUNTER — TELEPHONE (OUTPATIENT)
Dept: GASTROENTEROLOGY | Facility: MEDICAL CENTER | Age: 86
End: 2021-08-13

## 2021-08-13 LAB
BACTERIA BLD CULT: NORMAL
BACTERIA BLD CULT: NORMAL

## 2021-08-13 NOTE — TELEPHONE ENCOUNTER
----- Message from Sunita Rao PA-C sent at 8/12/2021  4:50 PM EDT -----  Please call patient to schedule hospital follow-up within 1 month for C diff infection

## 2021-10-14 ENCOUNTER — TELEPHONE (OUTPATIENT)
Dept: CARDIOLOGY CLINIC | Facility: CLINIC | Age: 86
End: 2021-10-14

## 2021-10-14 DIAGNOSIS — I71.2 THORACIC AORTIC ANEURYSM WITHOUT RUPTURE (HCC): ICD-10-CM

## 2021-10-15 RX ORDER — ASPIRIN 81 MG/1
81 TABLET ORAL DAILY
Qty: 30 TABLET | Refills: 0 | Status: SHIPPED | OUTPATIENT
Start: 2021-10-15 | End: 2022-07-26 | Stop reason: SDUPTHER

## 2021-11-09 ENCOUNTER — OFFICE VISIT (OUTPATIENT)
Dept: CARDIOLOGY CLINIC | Facility: CLINIC | Age: 86
End: 2021-11-09
Payer: COMMERCIAL

## 2021-11-09 VITALS
OXYGEN SATURATION: 91 % | HEART RATE: 64 BPM | DIASTOLIC BLOOD PRESSURE: 62 MMHG | BODY MASS INDEX: 27.9 KG/M2 | HEIGHT: 61 IN | WEIGHT: 147.8 LBS | SYSTOLIC BLOOD PRESSURE: 138 MMHG

## 2021-11-09 DIAGNOSIS — I10 BENIGN ESSENTIAL HYPERTENSION: ICD-10-CM

## 2021-11-09 DIAGNOSIS — I71.2 THORACIC AORTIC ANEURYSM WITHOUT RUPTURE (HCC): Primary | ICD-10-CM

## 2021-11-09 DIAGNOSIS — Z98.890 STATUS POST ENDOSCOPIC REPAIR OF THORACIC AORTIC ANEURYSM (TAA): ICD-10-CM

## 2021-11-09 DIAGNOSIS — Z86.79 STATUS POST ENDOSCOPIC REPAIR OF THORACIC AORTIC ANEURYSM (TAA): ICD-10-CM

## 2021-11-09 PROCEDURE — 99213 OFFICE O/P EST LOW 20 MIN: CPT | Performed by: INTERNAL MEDICINE

## 2021-11-09 RX ORDER — HYDROCHLOROTHIAZIDE 12.5 MG/1
12.5 TABLET ORAL DAILY
COMMUNITY
End: 2022-03-17 | Stop reason: HOSPADM

## 2021-11-09 RX ORDER — LOSARTAN POTASSIUM 100 MG/1
100 TABLET ORAL DAILY
COMMUNITY
Start: 2021-08-31 | End: 2022-03-17 | Stop reason: HOSPADM

## 2022-03-12 ENCOUNTER — APPOINTMENT (INPATIENT)
Dept: NON INVASIVE DIAGNOSTICS | Facility: HOSPITAL | Age: 87
DRG: 291 | End: 2022-03-12
Payer: MEDICARE

## 2022-03-12 ENCOUNTER — HOSPITAL ENCOUNTER (INPATIENT)
Facility: HOSPITAL | Age: 87
LOS: 5 days | Discharge: HOME HEALTH | DRG: 291 | End: 2022-03-17
Attending: EMERGENCY MEDICINE | Admitting: HOSPITALIST
Payer: MEDICARE

## 2022-03-12 ENCOUNTER — APPOINTMENT (EMERGENCY)
Dept: CT IMAGING | Facility: HOSPITAL | Age: 87
DRG: 291 | End: 2022-03-12
Payer: MEDICARE

## 2022-03-12 ENCOUNTER — APPOINTMENT (EMERGENCY)
Dept: RADIOLOGY | Facility: HOSPITAL | Age: 87
DRG: 291 | End: 2022-03-12
Payer: MEDICARE

## 2022-03-12 DIAGNOSIS — I50.9 HEART FAILURE, UNSPECIFIED HF CHRONICITY, UNSPECIFIED HEART FAILURE TYPE (HCC): ICD-10-CM

## 2022-03-12 DIAGNOSIS — R60.9 SWELLING: ICD-10-CM

## 2022-03-12 DIAGNOSIS — I50.9 CHF (CONGESTIVE HEART FAILURE) (HCC): Primary | ICD-10-CM

## 2022-03-12 DIAGNOSIS — M79.89 SWELLING OF LEFT LOWER EXTREMITY: ICD-10-CM

## 2022-03-12 DIAGNOSIS — R09.02 HYPOXIA: ICD-10-CM

## 2022-03-12 PROBLEM — I50.33 ACUTE ON CHRONIC DIASTOLIC CONGESTIVE HEART FAILURE (HCC): Status: ACTIVE | Noted: 2019-01-19

## 2022-03-12 PROBLEM — R42 LIGHTHEADEDNESS: Status: ACTIVE | Noted: 2022-03-12

## 2022-03-12 LAB
2HR DELTA HS TROPONIN: 2 NG/L
4HR DELTA HS TROPONIN: 3 NG/L
ALBUMIN SERPL BCP-MCNC: 3.3 G/DL (ref 3.5–5)
ALP SERPL-CCNC: 79 U/L (ref 46–116)
ALT SERPL W P-5'-P-CCNC: 40 U/L (ref 12–78)
ANION GAP SERPL CALCULATED.3IONS-SCNC: -1 MMOL/L (ref 4–13)
AST SERPL W P-5'-P-CCNC: 48 U/L (ref 5–45)
ATRIAL RATE: 63 BPM
ATRIAL RATE: 63 BPM
BACTERIA UR QL AUTO: NORMAL /HPF
BASOPHILS # BLD AUTO: 0.04 THOUSANDS/ΜL (ref 0–0.1)
BASOPHILS NFR BLD AUTO: 1 % (ref 0–1)
BILIRUB SERPL-MCNC: 0.64 MG/DL (ref 0.2–1)
BILIRUB UR QL STRIP: NEGATIVE
BUN SERPL-MCNC: 23 MG/DL (ref 5–25)
CALCIUM ALBUM COR SERPL-MCNC: 9.5 MG/DL (ref 8.3–10.1)
CALCIUM SERPL-MCNC: 8.9 MG/DL (ref 8.3–10.1)
CARDIAC TROPONIN I PNL SERPL HS: 19 NG/L
CARDIAC TROPONIN I PNL SERPL HS: 21 NG/L
CARDIAC TROPONIN I PNL SERPL HS: 22 NG/L
CHLORIDE SERPL-SCNC: 105 MMOL/L (ref 100–108)
CLARITY UR: CLEAR
CO2 SERPL-SCNC: 40 MMOL/L (ref 21–32)
COLOR UR: YELLOW
CREAT SERPL-MCNC: 1.13 MG/DL (ref 0.6–1.3)
EOSINOPHIL # BLD AUTO: 0.56 THOUSAND/ΜL (ref 0–0.61)
EOSINOPHIL NFR BLD AUTO: 8 % (ref 0–6)
ERYTHROCYTE [DISTWIDTH] IN BLOOD BY AUTOMATED COUNT: 15.9 % (ref 11.6–15.1)
GFR SERPL CREATININE-BSD FRML MDRD: 59 ML/MIN/1.73SQ M
GLUCOSE SERPL-MCNC: 116 MG/DL (ref 65–140)
GLUCOSE UR STRIP-MCNC: NEGATIVE MG/DL
HCT VFR BLD AUTO: 44.1 % (ref 36.5–49.3)
HGB BLD-MCNC: 12.3 G/DL (ref 12–17)
HGB UR QL STRIP.AUTO: ABNORMAL
IMM GRANULOCYTES # BLD AUTO: 0.01 THOUSAND/UL (ref 0–0.2)
IMM GRANULOCYTES NFR BLD AUTO: 0 % (ref 0–2)
KETONES UR STRIP-MCNC: NEGATIVE MG/DL
LEUKOCYTE ESTERASE UR QL STRIP: NEGATIVE
LYMPHOCYTES # BLD AUTO: 1.71 THOUSANDS/ΜL (ref 0.6–4.47)
LYMPHOCYTES NFR BLD AUTO: 24 % (ref 14–44)
MAGNESIUM SERPL-MCNC: 2.5 MG/DL (ref 1.6–2.6)
MCH RBC QN AUTO: 28.1 PG (ref 26.8–34.3)
MCHC RBC AUTO-ENTMCNC: 27.9 G/DL (ref 31.4–37.4)
MCV RBC AUTO: 101 FL (ref 82–98)
MONOCYTES # BLD AUTO: 0.98 THOUSAND/ΜL (ref 0.17–1.22)
MONOCYTES NFR BLD AUTO: 14 % (ref 4–12)
NEUTROPHILS # BLD AUTO: 3.85 THOUSANDS/ΜL (ref 1.85–7.62)
NEUTS SEG NFR BLD AUTO: 53 % (ref 43–75)
NITRITE UR QL STRIP: NEGATIVE
NON-SQ EPI CELLS URNS QL MICRO: NORMAL /HPF
NRBC BLD AUTO-RTO: 0 /100 WBCS
NT-PROBNP SERPL-MCNC: 4004 PG/ML
P AXIS: 73 DEGREES
P AXIS: 80 DEGREES
PH UR STRIP.AUTO: 6.5 [PH] (ref 4.5–8)
PLATELET # BLD AUTO: 155 THOUSANDS/UL (ref 149–390)
PMV BLD AUTO: 9.4 FL (ref 8.9–12.7)
POTASSIUM SERPL-SCNC: 4.1 MMOL/L (ref 3.5–5.3)
PR INTERVAL: 160 MS
PR INTERVAL: 162 MS
PROT SERPL-MCNC: 7.9 G/DL (ref 6.4–8.2)
PROT UR STRIP-MCNC: NEGATIVE MG/DL
QRS AXIS: 81 DEGREES
QRS AXIS: 89 DEGREES
QRSD INTERVAL: 74 MS
QRSD INTERVAL: 82 MS
QT INTERVAL: 398 MS
QT INTERVAL: 400 MS
QTC INTERVAL: 407 MS
QTC INTERVAL: 409 MS
RBC # BLD AUTO: 4.38 MILLION/UL (ref 3.88–5.62)
RBC #/AREA URNS AUTO: NORMAL /HPF
SODIUM SERPL-SCNC: 144 MMOL/L (ref 136–145)
SP GR UR STRIP.AUTO: 1.01 (ref 1–1.03)
T WAVE AXIS: 74 DEGREES
T WAVE AXIS: 76 DEGREES
UROBILINOGEN UR QL STRIP.AUTO: 0.2 E.U./DL
VENTRICULAR RATE: 63 BPM
VENTRICULAR RATE: 63 BPM
WBC # BLD AUTO: 7.15 THOUSAND/UL (ref 4.31–10.16)
WBC #/AREA URNS AUTO: NORMAL /HPF

## 2022-03-12 PROCEDURE — 83735 ASSAY OF MAGNESIUM: CPT | Performed by: PHYSICIAN ASSISTANT

## 2022-03-12 PROCEDURE — 99285 EMERGENCY DEPT VISIT HI MDM: CPT | Performed by: PHYSICIAN ASSISTANT

## 2022-03-12 PROCEDURE — 93005 ELECTROCARDIOGRAM TRACING: CPT

## 2022-03-12 PROCEDURE — 83880 ASSAY OF NATRIURETIC PEPTIDE: CPT | Performed by: PHYSICIAN ASSISTANT

## 2022-03-12 PROCEDURE — 70450 CT HEAD/BRAIN W/O DYE: CPT

## 2022-03-12 PROCEDURE — 84484 ASSAY OF TROPONIN QUANT: CPT | Performed by: PHYSICIAN ASSISTANT

## 2022-03-12 PROCEDURE — 71045 X-RAY EXAM CHEST 1 VIEW: CPT

## 2022-03-12 PROCEDURE — 85025 COMPLETE CBC W/AUTO DIFF WBC: CPT | Performed by: PHYSICIAN ASSISTANT

## 2022-03-12 PROCEDURE — 96374 THER/PROPH/DIAG INJ IV PUSH: CPT

## 2022-03-12 PROCEDURE — 99285 EMERGENCY DEPT VISIT HI MDM: CPT

## 2022-03-12 PROCEDURE — 93010 ELECTROCARDIOGRAM REPORT: CPT

## 2022-03-12 PROCEDURE — 93971 EXTREMITY STUDY: CPT

## 2022-03-12 PROCEDURE — 93971 EXTREMITY STUDY: CPT | Performed by: SURGERY

## 2022-03-12 PROCEDURE — 99223 1ST HOSP IP/OBS HIGH 75: CPT | Performed by: PHYSICIAN ASSISTANT

## 2022-03-12 PROCEDURE — 81001 URINALYSIS AUTO W/SCOPE: CPT

## 2022-03-12 PROCEDURE — 36415 COLL VENOUS BLD VENIPUNCTURE: CPT | Performed by: PHYSICIAN ASSISTANT

## 2022-03-12 PROCEDURE — 80053 COMPREHEN METABOLIC PANEL: CPT | Performed by: PHYSICIAN ASSISTANT

## 2022-03-12 PROCEDURE — G1004 CDSM NDSC: HCPCS

## 2022-03-12 RX ORDER — BUDESONIDE AND FORMOTEROL FUMARATE DIHYDRATE 160; 4.5 UG/1; UG/1
2 AEROSOL RESPIRATORY (INHALATION) 2 TIMES DAILY
Status: DISCONTINUED | OUTPATIENT
Start: 2022-03-12 | End: 2022-03-17 | Stop reason: HOSPADM

## 2022-03-12 RX ORDER — LOSARTAN POTASSIUM 50 MG/1
100 TABLET ORAL DAILY
Status: DISCONTINUED | OUTPATIENT
Start: 2022-03-13 | End: 2022-03-14

## 2022-03-12 RX ORDER — FERROUS SULFATE 325(65) MG
325 TABLET ORAL
Status: DISCONTINUED | OUTPATIENT
Start: 2022-03-13 | End: 2022-03-17 | Stop reason: HOSPADM

## 2022-03-12 RX ORDER — ATORVASTATIN CALCIUM 10 MG/1
10 TABLET, FILM COATED ORAL
Status: DISCONTINUED | OUTPATIENT
Start: 2022-03-13 | End: 2022-03-17 | Stop reason: HOSPADM

## 2022-03-12 RX ORDER — FERROUS SULFATE 325(65) MG
325 TABLET ORAL
COMMUNITY

## 2022-03-12 RX ORDER — CALCIUM CARBONATE 200(500)MG
1000 TABLET,CHEWABLE ORAL 3 TIMES DAILY PRN
Status: DISCONTINUED | OUTPATIENT
Start: 2022-03-12 | End: 2022-03-17 | Stop reason: HOSPADM

## 2022-03-12 RX ORDER — FUROSEMIDE 10 MG/ML
40 INJECTION INTRAMUSCULAR; INTRAVENOUS ONCE
Status: COMPLETED | OUTPATIENT
Start: 2022-03-13 | End: 2022-03-13

## 2022-03-12 RX ORDER — SODIUM CHLORIDE 9 MG/ML
3 INJECTION INTRAVENOUS
Status: DISCONTINUED | OUTPATIENT
Start: 2022-03-12 | End: 2022-03-17 | Stop reason: HOSPADM

## 2022-03-12 RX ORDER — METOPROLOL TARTRATE 50 MG/1
50 TABLET, FILM COATED ORAL EVERY 12 HOURS SCHEDULED
Status: DISCONTINUED | OUTPATIENT
Start: 2022-03-12 | End: 2022-03-17 | Stop reason: HOSPADM

## 2022-03-12 RX ORDER — FUROSEMIDE 10 MG/ML
40 INJECTION INTRAMUSCULAR; INTRAVENOUS ONCE
Status: COMPLETED | OUTPATIENT
Start: 2022-03-12 | End: 2022-03-12

## 2022-03-12 RX ORDER — PANTOPRAZOLE SODIUM 40 MG/1
40 TABLET, DELAYED RELEASE ORAL
Status: DISCONTINUED | OUTPATIENT
Start: 2022-03-13 | End: 2022-03-17 | Stop reason: HOSPADM

## 2022-03-12 RX ORDER — ACETAMINOPHEN 325 MG/1
650 TABLET ORAL EVERY 6 HOURS PRN
Status: DISCONTINUED | OUTPATIENT
Start: 2022-03-12 | End: 2022-03-17 | Stop reason: HOSPADM

## 2022-03-12 RX ORDER — ONDANSETRON 2 MG/ML
4 INJECTION INTRAMUSCULAR; INTRAVENOUS EVERY 6 HOURS PRN
Status: DISCONTINUED | OUTPATIENT
Start: 2022-03-12 | End: 2022-03-17 | Stop reason: HOSPADM

## 2022-03-12 RX ORDER — AMLODIPINE BESYLATE 5 MG/1
5 TABLET ORAL DAILY
Status: DISCONTINUED | OUTPATIENT
Start: 2022-03-13 | End: 2022-03-17 | Stop reason: HOSPADM

## 2022-03-12 RX ORDER — ALBUTEROL SULFATE 90 UG/1
2 AEROSOL, METERED RESPIRATORY (INHALATION) EVERY 4 HOURS PRN
Status: DISCONTINUED | OUTPATIENT
Start: 2022-03-12 | End: 2022-03-17 | Stop reason: HOSPADM

## 2022-03-12 RX ORDER — ASPIRIN 81 MG/1
81 TABLET ORAL DAILY
Status: DISCONTINUED | OUTPATIENT
Start: 2022-03-13 | End: 2022-03-17 | Stop reason: HOSPADM

## 2022-03-12 RX ORDER — HYDROCHLOROTHIAZIDE 12.5 MG/1
12.5 TABLET ORAL DAILY
Status: DISCONTINUED | OUTPATIENT
Start: 2022-03-13 | End: 2022-03-14

## 2022-03-12 RX ADMIN — BUDESONIDE AND FORMOTEROL FUMARATE DIHYDRATE 2 PUFF: 160; 4.5 AEROSOL RESPIRATORY (INHALATION) at 23:03

## 2022-03-12 RX ADMIN — FUROSEMIDE 40 MG: 10 INJECTION, SOLUTION INTRAVENOUS at 17:02

## 2022-03-12 RX ADMIN — METOPROLOL TARTRATE 50 MG: 50 TABLET, FILM COATED ORAL at 23:03

## 2022-03-12 NOTE — ASSESSMENT & PLAN NOTE
· Patient also complains of intermittent lightheadedness with exertion  · Notes he feels as though blood rushes to his head when he leans over and that the blood pressures down his back when he stands up  · Describes lightheadedness without room spinning sensation  · Check orthostatic vital signs to rule out orthostatic hypotension  · PT/OT consult  · Close monitoring while diuresing

## 2022-03-12 NOTE — ASSESSMENT & PLAN NOTE
· BP elevated at time of admission  · Continue Lopressor, losartan, HCTZ and Norvasc  · Check orthostatic blood pressures

## 2022-03-12 NOTE — H&P
[address] [facility]  H&P- [facility] [birth year], 82 y o  male MRN: [id number]  Unit/Bed#: ED 10 Encounter: [id number]  Primary Care Provider: Breanne Rasmussen MD   Date and time admitted to hospital: 3/12/2022  3:31 PM    * Acute on chronic diastolic congestive heart failure [facility])  Assessment & Plan  Wt Readings from Last 3 Encounters:   03/12/22 69 kg (152 lb 1 9 oz)   11/09/21 67 kg (147 lb 12 8 oz)   08/11/21 64 kg (141 lb 1 5 oz)     · Patient is to present to the hospital complaint of shortness of breath  · Does have elevated BNP and congestion seen on chest x-ray  · Repeat echocardiogram  · Not maintained on any outpatient diuretics other than 12 5 mg of HCTZ daily  · Receive 1 dose of IV Lasix in the ED, will repeat tomorrow morning and monitor response  · Monitor daily weights          Lightheadedness  Assessment & Plan  · Patient also complains of intermittent lightheadedness with exertion  · Notes he feels as though blood rushes to his head when he leans over and that the blood pressures down his back when he stands up  · Describes lightheadedness without room spinning sensation  · Check orthostatic vital signs to rule out orthostatic hypotension  · PT/OT consult  · Close monitoring while diuresing    Chronic respiratory failure with hypoxia (HCC)  Assessment & Plan  · History of chronic respiratory failure maintained outpatient on 2 L nasal cannula for COPD  · Continue while inpatient    Thoracic aortic aneurysm (HCC)  Assessment & Plan  · Status post TEVAR in 2018    Benign essential hypertension  Assessment & Plan  · BP elevated at time of admission  · Continue Lopressor, losartan, HCTZ and Norvasc  · Check orthostatic blood pressures    VTE Pharmacologic Prophylaxis: VTE Score: 5 High Risk (Score >/= 5) - Pharmacological DVT Prophylaxis Ordered: enoxaparin (Lovenox)  Sequential Compression Devices Ordered    Code Status: Prior full code  Discussion with family: Updated  (wife and daughter) at bedside  Anticipated Length of Stay: Patient will be admitted on an inpatient basis with an anticipated length of stay of greater than 2 midnights secondary to Acute CHF  Total Time for Visit, including Counseling / Coordination of Care: 60 minutes Greater than 50% of this total time spent on direct patient counseling and coordination of care  Chief Complaint:  Shortness of breath    History of Present Illness:  Rey Poe is a 82 y o  male with a PMH of COPD, CHF, hypertension who presents with shortness of breath  Patient presented the hospital with complaint of shortness of breath  He reported improvement with IV Lasix given in the emergency room  Notes he has been feeling generally ill for the past 2-3 days  Notes associated body aches  He also reports feeling a sensation of blood rushing to his head when he leans over than notes the blood rushes back down his back when he stands up  He notes he gets the same sensation as well as lightheadedness with exertion such as walking up a flight of stairs  He denies any room spinning sensation  Denies any recent falls  He denies any associated chest pain  Denies any recent fevers or chills  Does report he has been compliant with his medications, did stop taking his HCTZ a few days ago for unknown reason  Review of Systems:  Review of Systems   Constitutional: Negative for chills and fever  HENT: Negative for trouble swallowing  Eyes: Negative for visual disturbance  Respiratory: Positive for shortness of breath  Negative for cough  Cardiovascular: Negative for chest pain and leg swelling  Gastrointestinal: Negative for abdominal pain, nausea and vomiting  Genitourinary: Negative for difficulty urinating  Musculoskeletal: Negative for gait problem  Skin: Negative for rash  Neurological: Positive for light-headedness  Negative for dizziness     Psychiatric/Behavioral: Negative for confusion  Past Medical and Surgical History:   Past Medical History:   Diagnosis Date    Acute metabolic encephalopathy 5/91/0881    Appendicolith     Ascending aortic aneurysm (HCC)     3 7    Asthma     BPH (benign prostatic hyperplasia)     CAD (coronary artery disease)     noted on CT scan    COPD (chronic obstructive pulmonary disease) (HCC)     Descending thoracic aortic aneurysm (HCC)     Diverticulosis     Former tobacco use     GERD (gastroesophageal reflux disease)     History of DVT (deep vein thrombosis)     Left leg    History of transfusion     Hypertension     Inguinal hernia     right    Nephrolithiasis     Oxygen dependent     2LNC    Prostate calculus     PVD (peripheral vascular disease) (HCC)     Ulcer     Varicose vein of leg     b/l       Past Surgical History:   Procedure Laterality Date    CARDIAC SURGERY      ESOPHAGOGASTRODUODENOSCOPY      HERNIA REPAIR Right 1/21/2019    Procedure: REPAIR HERNIA INGUINAL WITH MESH;  Surgeon: Arya Redd MD;  Location: [address] [place] [address] MAIN OR;  Service: General    INGUINAL HERNIA REPAIR Bilateral     IR TEVAR  12/27/2018    [place] ENDOVASC TAA REINCL SUBCL N/A 12/27/2018    Procedure: TEVAR - endovascular thoracic aortic aneurysm repair;  Surgeon: Lio Ortega MD;  Location:  MAIN OR;  Service: Vascular    THORACIC AORTIC ANEURYSM REPAIR  12/27/2018    VARICOSE VEIN SURGERY Bilateral     vein stripping       Meds/Allergies:  Prior to Admission medications    Medication Sig Start Date End Date Taking?  Authorizing Provider   acetaminophen (TYLENOL) 325 mg tablet Take 2 tablets (650 mg total) by mouth every 6 (six) hours as needed for fever (temperature greater than 101 F) 12/31/18  Yes Lucas Munoz PA-C   albuterol (2 5 mg/3 mL) 0 083 % nebulizer solution Take 1 vial (2 5 mg total) by nebulization every 6 (six) hours as needed for wheezing or shortness of breath 6/26/18  Yes Kameron Soler MD   albuterol (VENTOLIN HFA) 90 mcg/act inhaler Inhale 2 puffs 8/8/17  Yes Historical Provider, MD   amLODIPine (NORVASC) 10 mg tablet Take 0 5 tablets (5 mg total) by mouth daily 8/10/21  Yes Dagoberto Siu PA-C   aspirin (Aspirin 81) 81 mg EC tablet Take 1 tablet (81 mg total) by mouth daily 10/15/21  Yes Jr Laura MD   atorvastatin (LIPITOR) 10 mg tablet Take 1 tablet (10 mg total) by mouth daily 6/16/20  Yes Colt Bright DO   ferrous sulfate 325 (65 Fe) mg tablet Take 325 mg by mouth daily with breakfast   Yes Historical Provider, MD   hydrochlorothiazide (HYDRODIURIL) 12 5 mg tablet Take 12 5 mg by mouth daily   Yes Historical Provider, MD   losartan (COZAAR) 100 MG tablet Take 100 mg by mouth daily   8/31/21  Yes Historical Provider, MD   metoprolol tartrate (LOPRESSOR) 50 mg tablet Take 1 tablet (50 mg total) by mouth every 12 (twelve) hours 6/16/20  Yes Colt Bright DO   pantoprazole (PROTONIX) 40 mg tablet Take 1 tablet (40 mg total) by mouth daily 6/16/20  Yes Colt Bright DO   tiotropium (SPIRIVA) 18 mcg inhalation capsule Place 1 capsule (18 mcg total) into inhaler and inhale daily 6/16/20  Yes Colt Bright DO   budesonide-formoterol (SYMBICORT) 160-4 5 mcg/act inhaler Inhale 2 puffs 2 (two) times a day Rinse mouth after use  6/16/20   Robbin Carey DO   melatonin 3 mg Take 2 tablets (6 mg total) by mouth daily at bedtime  Patient not taking: Reported on 6/19/2020 6/16/20   Robbin Carey DO     I have reviewed home medications with patient family member  Allergies:    Allergies   Allergen Reactions    Penicillins Hives, Itching and Rash    Lisinopril Rash     Side pains and rash        Social History:  Marital Status: /Civil Union   Occupation:  Unknown  Patient Pre-hospital Living Situation: Home  Patient Pre-hospital Level of Mobility: walks  Patient Pre-hospital Diet Restrictions:  Cardiac  Substance Use History:   Social History     Substance and Sexual Activity   Alcohol Use Never     Social History Tobacco Use   Smoking Status Former Smoker    Packs/day: 1 00    Years: 35 00    Pack years: 35 00    Start date:     Quit date:     Years since quittin 2   Smokeless Tobacco Never Used     Social History     Substance and Sexual Activity   Drug Use No       Family History:  Family History   Problem Relation Age of Onset    Tuberculosis Mother     No Known Problems Father     Cancer Sister     Diabetes Family     Hypertension Family        Physical Exam:     Vitals:   Blood Pressure: 143/70 (22 1905)  Pulse: 65 (22 190)  Temperature: 98 2 °F (36 8 °C) (22 1548)  Temp Source: Oral (22 1548)  Respirations: 16 (22 190)  Weight - Scale: 69 kg (152 lb 1 9 oz) (22 1536)  SpO2: 96 % (22)    Physical Exam  Vitals reviewed  Constitutional:       General: He is not in acute distress  Comments: 2L   HENT:      Head: Normocephalic and atraumatic  Eyes:      General: No scleral icterus  Conjunctiva/sclera: Conjunctivae normal    Cardiovascular:      Rate and Rhythm: Normal rate and regular rhythm  Heart sounds: No murmur heard  Pulmonary:      Effort: Pulmonary effort is normal  No respiratory distress  Breath sounds: Normal breath sounds  Abdominal:      General: Bowel sounds are normal  There is no distension  Palpations: Abdomen is soft  Tenderness: There is no abdominal tenderness  Musculoskeletal:      Cervical back: Neck supple  Right lower leg: No edema  Left lower leg: No edema  Skin:     General: Skin is warm and dry  Neurological:      Mental Status: He is alert and oriented to person, place, and time     Psychiatric:         Mood and Affect: Mood normal          Behavior: Behavior normal           Additional Data:     Lab Results:  Results from last 7 days   Lab Units 22  1555   WBC Thousand/uL 7 15   HEMOGLOBIN g/dL 12 3   HEMATOCRIT % 44 1   PLATELETS Thousands/uL 155   NEUTROS PCT % 53   LYMPHS PCT % 24   MONOS PCT % 14*   EOS PCT % 8*     Results from last 7 days   Lab Units 03/12/22  1555   SODIUM mmol/L 144   POTASSIUM mmol/L 4 1   CHLORIDE mmol/L 105   CO2 mmol/L 40*   BUN mg/dL 23   CREATININE mg/dL 1 13   ANION GAP mmol/L -1*   CALCIUM mg/dL 8 9   ALBUMIN g/dL 3 3*   TOTAL BILIRUBIN mg/dL 0 64   ALK PHOS U/L 79   ALT U/L 40   AST U/L 48*   GLUCOSE RANDOM mg/dL 116                       Imaging: Reviewed radiology reports from this admission including: chest xray and CT head  CT head without contrast   Final Result by Juan Higuera MD (03/12 1652)      No acute intracranial abnormality  Microangiopathic changes  Workstation performed: [id number]         X-ray chest 1 view portable   ED Interpretation by Abraham Tompkins PA-C (03/12 1627)   Increased pulmonary vascular congestion      VAS lower limb venous duplex study, unilateral/limited    (Results Pending)       EKG and Other Studies Reviewed on Admission:   · EKG: NSR  HR 63     ** Please Note: This note has been constructed using a voice recognition system   **

## 2022-03-12 NOTE — ASSESSMENT & PLAN NOTE
· History of chronic respiratory failure maintained outpatient on 2 L nasal cannula for COPD  · Continue while inpatient

## 2022-03-12 NOTE — ED PROVIDER NOTES
History  Chief Complaint   Patient presents with    Shortness of Breath     pt reports SOB beginning two days ago along with cough and CP  pt on 2L at home with hx COPD  81 y/o male with PMHx of COPD, asthma, BPH, CAD, GERD, HTN presents with increased SOB x 2 days with cough,orthopnea  (+) CP, no n/v/d  No known fever or chills  Reports generalized malaise and fatigue  No headches  Denies fall  Shortness of Breath  Associated symptoms: cough        Prior to Admission Medications   Prescriptions Last Dose Informant Patient Reported? Taking?   acetaminophen (TYLENOL) 325 mg tablet Past Week at Unknown time Self No Yes   Sig: Take 2 tablets (650 mg total) by mouth every 6 (six) hours as needed for fever (temperature greater than 101 F)   albuterol (2 5 mg/3 mL) 0 083 % nebulizer solution 3/12/2022 at Unknown time Self No Yes   Sig: Take 1 vial (2 5 mg total) by nebulization every 6 (six) hours as needed for wheezing or shortness of breath   albuterol (VENTOLIN HFA) 90 mcg/act inhaler 3/12/2022 at + Self Yes Yes   Sig: Inhale 2 puffs   amLODIPine (NORVASC) 10 mg tablet 3/12/2022 at Unknown time  No Yes   Sig: Take 0 5 tablets (5 mg total) by mouth daily   aspirin (Aspirin 81) 81 mg EC tablet 3/12/2022 at Unknown time  No Yes   Sig: Take 1 tablet (81 mg total) by mouth daily   atorvastatin (LIPITOR) 10 mg tablet 3/12/2022 at Unknown time Self No Yes   Sig: Take 1 tablet (10 mg total) by mouth daily   budesonide-formoterol (SYMBICORT) 160-4 5 mcg/act inhaler  Self No No   Sig: Inhale 2 puffs 2 (two) times a day Rinse mouth after use     ferrous sulfate 325 (65 Fe) mg tablet 3/12/2022 at Unknown time  Yes Yes   Sig: Take 325 mg by mouth daily with breakfast   hydrochlorothiazide (HYDRODIURIL) 12 5 mg tablet 3/12/2022 at Unknown time  Yes Yes   Sig: Take 12 5 mg by mouth daily   losartan (COZAAR) 100 MG tablet 3/12/2022 at Unknown time  Yes Yes   Sig: Take 100 mg by mouth daily     melatonin 3 mg Not Taking at Unknown time Self No No   Sig: Take 2 tablets (6 mg total) by mouth daily at bedtime   Patient not taking: Reported on 6/19/2020   metoprolol tartrate (LOPRESSOR) 50 mg tablet 3/12/2022 at Unknown time Self No Yes   Sig: Take 1 tablet (50 mg total) by mouth every 12 (twelve) hours   pantoprazole (PROTONIX) 40 mg tablet 3/12/2022 at Unknown time Self No Yes   Sig: Take 1 tablet (40 mg total) by mouth daily   tiotropium (SPIRIVA) 18 mcg inhalation capsule 3/12/2022 at Unknown time Self No Yes   Sig: Place 1 capsule (18 mcg total) into inhaler and inhale daily      Facility-Administered Medications: None       Past Medical History:   Diagnosis Date    Acute metabolic encephalopathy 0/51/6041    Appendicolith     Ascending aortic aneurysm (HCC)     3 7    Asthma     BPH (benign prostatic hyperplasia)     CAD (coronary artery disease)     noted on CT scan    COPD (chronic obstructive pulmonary disease) (HCC)     Descending thoracic aortic aneurysm ([address] )     Diverticulosis     Former tobacco use     GERD (gastroesophageal reflux disease)     History of DVT (deep vein thrombosis)     Left leg    History of transfusion     Hypertension     Inguinal hernia     right    Nephrolithiasis     Oxygen dependent     2LNC    Prostate calculus     PVD (peripheral vascular disease) (HCC)     Ulcer     Varicose vein of leg     b/l       Past Surgical History:   Procedure Laterality Date    CARDIAC SURGERY      ESOPHAGOGASTRODUODENOSCOPY      HERNIA REPAIR Right 1/21/2019    Procedure: REPAIR HERNIA INGUINAL WITH MESH;  Surgeon: Abbie Saul MD;  Location: [facility] MAIN OR;  Service: General    INGUINAL HERNIA REPAIR Bilateral     IR TEVAR  12/27/2018    [place] ENDOVASC TAA REINCL SUBCL N/A 12/27/2018    Procedure: TEVAR - endovascular thoracic aortic aneurysm repair;  Surgeon: Shahnaz Ortega MD;  Location: [facility];  Service: Vascular    THORACIC AORTIC ANEURYSM REPAIR  12/27/2018    VARICOSE VEIN SURGERY Bilateral vein stripping       Family History   Problem Relation Age of Onset    Tuberculosis Mother     No Known Problems Father     Cancer Sister     Diabetes Family     Hypertension Family      I have reviewed and agree with the history as documented  E-Cigarette/Vaping    E-Cigarette Use Never User      E-Cigarette/Vaping Substances     Social History     Tobacco Use    Smoking status: Former Smoker     Packs/day: 1 00     Years: 35 00     Pack years: 35 00     Start date:      Quit date:      Years since quittin 2    Smokeless tobacco: Never Used   Vaping Use    Vaping Use: Never used   Substance Use Topics    Alcohol use: Never    Drug use: No       Review of Systems   Unable to perform ROS: Mental status change   Constitutional: Positive for fatigue  HENT: Positive for congestion  Respiratory: Positive for cough and shortness of breath  Psychiatric/Behavioral: Positive for confusion  Physical Exam  Physical Exam  Vitals and nursing note reviewed  Constitutional:       General: He is not in acute distress  Appearance: Normal appearance  He is not ill-appearing, toxic-appearing or diaphoretic  HENT:      Head: Normocephalic and atraumatic  Mouth/Throat:      Mouth: Mucous membranes are moist    Eyes:      General: No scleral icterus  Pupils: Pupils are equal, round, and reactive to light  Cardiovascular:      Rate and Rhythm: Normal rate and regular rhythm  Pulses: Normal pulses  Heart sounds: Normal heart sounds  Pulmonary:      Effort: Pulmonary effort is normal       Breath sounds: Decreased breath sounds present  Abdominal:      General: Abdomen is flat  There is no distension  Palpations: Abdomen is soft  Tenderness: There is no abdominal tenderness  There is no guarding or rebound  Hernia: No hernia is present  Musculoskeletal:         General: No swelling or tenderness  Normal range of motion        Cervical back: Normal range of motion  Left lower leg: Edema present  Skin:     General: Skin is warm  Capillary Refill: Capillary refill takes less than 2 seconds  Neurological:      General: No focal deficit present  Mental Status: He is alert  He is confused        Comments: Alert to person and place  Month: April  Day of the week: Saturday  Year: unknonwn   Psychiatric:         Mood and Affect: Mood normal          Vital Signs  ED Triage Vitals   Temperature Pulse Respirations Blood Pressure SpO2   03/12/22 1548 03/12/22 1536 03/12/22 1536 03/12/22 1536 03/12/22 1536   98 2 °F (36 8 °C) 68 16 (!) 183/81 (!) 82 %      Temp Source Heart Rate Source Patient Position - Orthostatic VS BP Location FiO2 (%)   03/12/22 1548 03/12/22 1536 03/12/22 1536 03/12/22 1536 --   Oral Monitor Lying Right arm       Pain Score       03/12/22 1536       No Pain           Vitals:    03/12/22 1548 03/12/22 1704 03/12/22 1905 03/12/22 1945   BP: 165/76 (!) 172/81 143/70 103/73   Pulse: 65 65 65 68   Patient Position - Orthostatic VS: Lying Sitting Sitting Lying         Visual Acuity      ED Medications  Medications   sodium chloride (PF) 0 9 % injection 3 mL (has no administration in time range)   furosemide (LASIX) injection 40 mg (40 mg Intravenous Given 3/12/22 1702)       Diagnostic Studies  Results Reviewed     Procedure Component Value Units Date/Time    Urine Microscopic [[id number]]  (Normal) Collected: 03/12/22 1848    Lab Status: Final result Specimen: Urine, Clean Catch Updated: 03/12/22 1923     RBC, UA 2-4 /hpf      WBC, UA 2-4 /hpf      Epithelial Cells None Seen /hpf      Bacteria, UA None Seen /hpf     Urine Macroscopic, POC [[id number]]  (Abnormal) Collected: 03/12/22 1848    Lab Status: Final result Specimen: Urine Updated: 03/12/22 1849     Color, UA Yellow     Clarity, UA Clear     pH, UA 6 5     Leukocytes, UA Negative     Nitrite, UA Negative     Protein, UA Negative mg/dl      Glucose, UA Negative mg/dl      Ketones, UA Negative mg/dl      Urobilinogen, UA 0 2 E U /dl      Bilirubin, UA Negative     Blood, UA Small     Specific [place], UA 1 010    Narrative:      CLINITEK RESULT    HS Troponin I 2hr [[id number]]  (Normal) Collected: 03/12/22 1755    Lab Status: Final result Specimen: Blood from Arm, Right Updated: 03/12/22 1828     hs TnI 2hr 21 ng/L      Delta 2hr hsTnI 2 ng/L     NT-BNP PRO [[id number]]  (Abnormal) Collected: 03/12/22 1555    Lab Status: Final result Specimen: Blood from Arm, Right Updated: 03/12/22 1637     NT-proBNP 4,004 pg/mL     Comprehensive metabolic panel [[id number]]  (Abnormal) Collected: 03/12/22 1555    Lab Status: Final result Specimen: Blood from Arm, Right Updated: 03/12/22 1637     Sodium 144 mmol/L      Potassium 4 1 mmol/L      Chloride 105 mmol/L      CO2 40 mmol/L      ANION GAP -1 mmol/L      BUN 23 mg/dL      Creatinine 1 13 mg/dL      Glucose 116 mg/dL      Calcium 8 9 mg/dL      Corrected Calcium 9 5 mg/dL      AST 48 U/L      ALT 40 U/L      Alkaline Phosphatase 79 U/L      Total Protein 7 9 g/dL      Albumin 3 3 g/dL      Total Bilirubin 0 64 mg/dL      eGFR 59 ml/min/1 73sq m     Narrative:      Darryl guidelines for Chronic Kidney Disease (CKD):     Stage 1 with normal or high GFR (GFR > 90 mL/min/1 73 square meters)    Stage 2 Mild CKD (GFR = 60-89 mL/min/1 73 square meters)    Stage 3A Moderate CKD (GFR = 45-59 mL/min/1 73 square meters)    Stage 3B Moderate CKD (GFR = 30-44 mL/min/1 73 square meters)    Stage 4 Severe CKD (GFR = 15-29 mL/min/1 73 square meters)    Stage 5 End Stage CKD (GFR <15 mL/min/1 73 square meters)  Note: GFR calculation is accurate only with a steady state creatinine    Magnesium [[id number]]  (Normal) Collected: 03/12/22 1555    Lab Status: Final result Specimen: Blood from Arm, Right Updated: 03/12/22 1637     Magnesium 2 5 mg/dL     HS Troponin 0hr (reflex protocol) [[id number]]  (Normal) Collected: 03/12/22 1555    Lab Status: Final result Specimen: Blood from Arm, Right Updated: 03/12/22 1630     hs TnI 0hr 19 ng/L     HS Troponin I 4hr [[id number]]     Lab Status: No result Specimen: Blood     CBC and differential [[id number]]  (Abnormal) Collected: 03/12/22 1555    Lab Status: Final result Specimen: Blood from Arm, Right Updated: 03/12/22 1607     WBC 7 15 Thousand/uL      RBC 4 38 Million/uL      Hemoglobin 12 3 g/dL      Hematocrit 44 1 %       fL      MCH 28 1 pg      MCHC 27 9 g/dL      RDW 15 9 %      MPV 9 4 fL      Platelets 174 Thousands/uL      nRBC 0 /100 WBCs      Neutrophils Relative 53 %      Immat GRANS % 0 %      Lymphocytes Relative 24 %      Monocytes Relative 14 %      Eosinophils Relative 8 %      Basophils Relative 1 %      Neutrophils Absolute 3 85 Thousands/µL      Immature Grans Absolute 0 01 Thousand/uL      Lymphocytes Absolute 1 71 Thousands/µL      Monocytes Absolute 0 98 Thousand/µL      Eosinophils Absolute 0 56 Thousand/µL      Basophils Absolute 0 04 Thousands/µL                  CT head without contrast   Final Result by Zoran Pablo MD (03/12 1652)      No acute intracranial abnormality  Microangiopathic changes                    Workstation performed: [id number]         X-ray chest 1 view portable   ED Interpretation by Markie Melton PA-C (03/12 1627)   Increased pulmonary vascular congestion      VAS lower limb venous duplex study, unilateral/limited    (Results Pending)              Procedures  Procedures         ED Course  ED Course as of 03/12/22 1946   Sat Mar 12, 2022   1553 EKG shows Vent rate of 63  MI interval of 160  QRS duration 74  QT/Qtc 398/407  PRT axes 73 81 74  No change compared to 8/5/21 EKG   1607 82% on his normal 2L, increased to 3L, up to 95%  (+) wet cough  Given confusion, will ct head and check urine  Ems Apparenlty called by a friend, states he live with his wife, however, no one at bedside   1635 Initial troponin is 19- will await delta troponin   1654 BNP 4004- will start Lasix   1655 CT head: IMPRESSION:     No acute intracranial abnormality  Microangiopathic changes  65 Family updated on need for admission   1754 Vascular tech states she will come in for [address] when she can  TT to [facility] for admission   1802 Repeat EKG  Vent Rate 63  SD Interval 162  QRS duration 82  QT/Qtc 400/409  PRT axes 80 89 76   1937 DVT study is negative                               SBIRT 20yo+      Most Recent Value   SBIRT (24 yo +)    In order to provide better care to our patients, we are screening all of our patients for alcohol and drug use  Would it be okay to ask you these screening questions?  No Filed at: 03/12/2022 1539                    MDM  Number of Diagnoses or Management Options  CHF (congestive heart failure) ([facility] ): new and requires workup  Hypoxia: new and requires workup  Swelling of left lower extremity: new and requires workup     Amount and/or Complexity of Data Reviewed  Clinical lab tests: ordered and reviewed  Tests in the radiology section of CPT®: ordered and reviewed  Discuss the patient with other providers: yes  Independent visualization of images, tracings, or specimens: yes    Risk of Complications, Morbidity, and/or Mortality  Presenting problems: high  Diagnostic procedures: high  Management options: high    Patient Progress  Patient progress: stable      Disposition  Final diagnoses:   CHF (congestive heart failure) ([facility] )   Hypoxia   Swelling of left lower extremity     Time reflects when diagnosis was documented in both MDM as applicable and the Disposition within this note     Time User Action Codes Description Comment    3/12/2022  6:08 PM Kamran Overton Add [I50 9] CHF (congestive heart failure) ([facility] )     3/12/2022  6:08 PM Kamran Overton Add [R09 02] Hypoxia     3/12/2022  6:08 PM Yajaira Taylor Add [M79 89] Swelling of left lower extremity     3/12/2022  6:40 PM Momo Adame Add [R60 9] Swelling       ED Disposition     ED Disposition Condition Date/Time Comment    Admit Stable Sat Mar 12, 2022  6:08 PM Case was discussed with MONICO and the patient's admission status was agreed to be Admission Status: inpatient status to the service of Dr Sanjay Crowe   Follow-up Information    None         Patient's Medications   Discharge Prescriptions    No medications on file       No discharge procedures on file      PDMP Review     None          ED Provider  Electronically Signed by           Mily Shane PA-C  03/12/22 [id number] Jaron Jasso PA-C  03/12/22 1946

## 2022-03-12 NOTE — ASSESSMENT & PLAN NOTE
Wt Readings from Last 3 Encounters:   03/12/22 69 kg (152 lb 1 9 oz)   11/09/21 67 kg (147 lb 12 8 oz)   08/11/21 64 kg (141 lb 1 5 oz)     · Patient is to present to the hospital complaint of shortness of breath  · Does have elevated BNP and congestion seen on chest x-ray  · Repeat echocardiogram  · Not maintained on any outpatient diuretics other than 12 5 mg of HCTZ daily  · Receive 1 dose of IV Lasix in the ED, will repeat tomorrow morning and monitor response  · Monitor daily weights

## 2022-03-13 LAB
ANION GAP SERPL CALCULATED.3IONS-SCNC: 1 MMOL/L (ref 4–13)
BUN SERPL-MCNC: 22 MG/DL (ref 5–25)
CALCIUM SERPL-MCNC: 8.5 MG/DL (ref 8.3–10.1)
CHLORIDE SERPL-SCNC: 103 MMOL/L (ref 100–108)
CO2 SERPL-SCNC: 41 MMOL/L (ref 21–32)
CREAT SERPL-MCNC: 1.09 MG/DL (ref 0.6–1.3)
ERYTHROCYTE [DISTWIDTH] IN BLOOD BY AUTOMATED COUNT: 15.8 % (ref 11.6–15.1)
GFR SERPL CREATININE-BSD FRML MDRD: 61 ML/MIN/1.73SQ M
GLUCOSE SERPL-MCNC: 83 MG/DL (ref 65–140)
HCT VFR BLD AUTO: 43.7 % (ref 36.5–49.3)
HGB BLD-MCNC: 12.3 G/DL (ref 12–17)
MCH RBC QN AUTO: 28.8 PG (ref 26.8–34.3)
MCHC RBC AUTO-ENTMCNC: 28.1 G/DL (ref 31.4–37.4)
MCV RBC AUTO: 102 FL (ref 82–98)
PLATELET # BLD AUTO: 145 THOUSANDS/UL (ref 149–390)
PMV BLD AUTO: 9.6 FL (ref 8.9–12.7)
POTASSIUM SERPL-SCNC: 4.3 MMOL/L (ref 3.5–5.3)
RBC # BLD AUTO: 4.27 MILLION/UL (ref 3.88–5.62)
SODIUM SERPL-SCNC: 145 MMOL/L (ref 136–145)
WBC # BLD AUTO: 6.83 THOUSAND/UL (ref 4.31–10.16)

## 2022-03-13 PROCEDURE — 99223 1ST HOSP IP/OBS HIGH 75: CPT

## 2022-03-13 PROCEDURE — 99232 SBSQ HOSP IP/OBS MODERATE 35: CPT | Performed by: INTERNAL MEDICINE

## 2022-03-13 PROCEDURE — 80048 BASIC METABOLIC PNL TOTAL CA: CPT | Performed by: PHYSICIAN ASSISTANT

## 2022-03-13 PROCEDURE — 85027 COMPLETE CBC AUTOMATED: CPT | Performed by: PHYSICIAN ASSISTANT

## 2022-03-13 PROCEDURE — 97163 PT EVAL HIGH COMPLEX 45 MIN: CPT

## 2022-03-13 RX ORDER — FUROSEMIDE 10 MG/ML
40 INJECTION INTRAMUSCULAR; INTRAVENOUS ONCE
Status: COMPLETED | OUTPATIENT
Start: 2022-03-13 | End: 2022-03-13

## 2022-03-13 RX ADMIN — METOPROLOL TARTRATE 50 MG: 50 TABLET, FILM COATED ORAL at 08:34

## 2022-03-13 RX ADMIN — FERROUS SULFATE TAB 325 MG (65 MG ELEMENTAL FE) 325 MG: 325 (65 FE) TAB at 08:33

## 2022-03-13 RX ADMIN — UMECLIDINIUM 1 PUFF: 62.5 AEROSOL, POWDER ORAL at 08:34

## 2022-03-13 RX ADMIN — LOSARTAN POTASSIUM 100 MG: 50 TABLET, FILM COATED ORAL at 08:33

## 2022-03-13 RX ADMIN — METOPROLOL TARTRATE 50 MG: 50 TABLET, FILM COATED ORAL at 22:05

## 2022-03-13 RX ADMIN — BUDESONIDE AND FORMOTEROL FUMARATE DIHYDRATE 2 PUFF: 160; 4.5 AEROSOL RESPIRATORY (INHALATION) at 08:34

## 2022-03-13 RX ADMIN — PANTOPRAZOLE SODIUM 40 MG: 40 TABLET, DELAYED RELEASE ORAL at 06:23

## 2022-03-13 RX ADMIN — ACETAMINOPHEN 325MG 650 MG: 325 TABLET ORAL at 22:05

## 2022-03-13 RX ADMIN — FUROSEMIDE 40 MG: 10 INJECTION, SOLUTION INTRAVENOUS at 14:22

## 2022-03-13 RX ADMIN — ENOXAPARIN SODIUM 40 MG: 40 INJECTION SUBCUTANEOUS at 08:34

## 2022-03-13 RX ADMIN — ASPIRIN 81 MG: 81 TABLET, COATED ORAL at 08:33

## 2022-03-13 RX ADMIN — HYDROCHLOROTHIAZIDE 12.5 MG: 12.5 TABLET ORAL at 08:34

## 2022-03-13 RX ADMIN — ATORVASTATIN CALCIUM 10 MG: 10 TABLET, FILM COATED ORAL at 17:04

## 2022-03-13 RX ADMIN — AMLODIPINE BESYLATE 5 MG: 5 TABLET ORAL at 08:34

## 2022-03-13 RX ADMIN — BUDESONIDE AND FORMOTEROL FUMARATE DIHYDRATE 2 PUFF: 160; 4.5 AEROSOL RESPIRATORY (INHALATION) at 17:04

## 2022-03-13 RX ADMIN — FUROSEMIDE 40 MG: 10 INJECTION, SOLUTION INTRAMUSCULAR; INTRAVENOUS at 08:34

## 2022-03-13 NOTE — PHYSICAL THERAPY NOTE
PT EVALUATION 08:43-09:05    82 y o     [id number]    Swelling [R60 9]  CHF (congestive heart failure) (HCC) [I50 9]  SOB (shortness of breath) [R06 02]  Hypoxia [R09 02]  Swelling of left lower extremity [M79 89]    Past Medical History:   Diagnosis Date    Acute metabolic encephalopathy 3/42/5502    Appendicolith     Ascending aortic aneurysm (HCC)     3 7    Asthma     BPH (benign prostatic hyperplasia)     CAD (coronary artery disease)     noted on CT scan    COPD (chronic obstructive pulmonary disease) (HCC)     Descending thoracic aortic aneurysm (HCC)     Diverticulosis     Former tobacco use     GERD (gastroesophageal reflux disease)     History of DVT (deep vein thrombosis)     Left leg    History of transfusion     Hypertension     Inguinal hernia     right    Nephrolithiasis     Oxygen dependent     2LNC    Prostate calculus     PVD (peripheral vascular disease) (HCC)     Ulcer     Varicose vein of leg     b/l         Past Surgical History:   Procedure Laterality Date    CARDIAC SURGERY      ESOPHAGOGASTRODUODENOSCOPY      HERNIA REPAIR Right 1/21/2019    Procedure: REPAIR HERNIA INGUINAL WITH MESH;  Surgeon: Jose Quinteros MD;  Location: [address] [place] [address] MAIN OR;  Service: General    INGUINAL HERNIA REPAIR Bilateral     IR TEVAR  12/27/2018    [place] ENDOVASC TAA REINCL SUBCL N/A 12/27/2018    Procedure: TEVAR - endovascular thoracic aortic aneurysm repair;  Surgeon: Dillon Ortega MD;  Location:  MAIN OR;  Service: Vascular    THORACIC AORTIC ANEURYSM REPAIR  12/27/2018    VARICOSE VEIN SURGERY Bilateral     vein stripping      03/13/22 0843   PT Last Visit   PT Visit Date 03/13/22   Note Type   Note type Evaluation   Pain Assessment   Pain Score No Pain   Restrictions/Precautions   Weight Bearing Precautions Per Order No   Home Living   Type of Home Apartment   Home Layout One level;Stairs to enter with rails  ([address])   Bathroom Shower/Tub Walk-in shower   Bathroom Toilet Standard [address] [place] [address] chair   Bathroom Accessibility Accessible   Home Equipment Walker;Cane;Other (Comment)  (2 L O2  )   Additional Comments resides with spouse in apt with [address]  Prior Function   Level of [place] Independent with ADLs and functional mobility; Needs assistance with IADLs; Needs assistance with ADLs and functional mobility   Lives With Spouse   Receives Help From Family   ADL Assistance Needs assistance   IADLs Needs assistance   Falls in the last 6 months 0  (one fall in hospital 7 months ago  )   Comments I PTA without AD in home, use of cane v RW outside of home  General   Additional Pertinent History Pt is 81 y/o male admitted with acute on chronic CHF  PT consulted  Family/Caregiver Present Yes  (spouse)   Cognition   Arousal/Participation Responsive   Orientation Level Oriented to person;Oriented to place   Following Commands Follows one step commands without difficulty   Comments Primarily Amharic speaking  Wife present to provide information  Pt with flat affect  Subjective   Subjective Feels ok  RUE Assessment   RUE Assessment WFL  (grossly at least 3+/5)   LUE Assessment   LUE Assessment WFL  (grossly at least 3+/5)   RLE Assessment   RLE Assessment X   Strength RLE   R Hip Flexion 3-/5   R Knee Extension 4-/5   R Ankle Dorsiflexion 3+/5   R Ankle Plantar Flexion 3+/5   LLE Assessment   LLE Assessment X   Strength LLE   L Hip Flexion 3-/5   L Knee Extension 4-/5   L Ankle Dorsiflexion 3+/5   L Ankle Plantar Flexion 3+/5   Bed Mobility   Supine to Sit Unable to assess   Additional Comments received sitting EOB  Wife present  Transfers   Sit to Stand 4  Minimal assistance   Additional items Assist x 1; Increased time required;Verbal cues   Stand to Sit 4  Minimal assistance   Additional items Assist x 1; Increased time required;Verbal cues   Additional Comments cues for hand placement  Increased time to complete  Flexed posture      Ambulation/Elevation   Gait pattern Improper Weight shift;Decreased foot clearance; Inconsistent rafia; Short stride; Foward flexed   Gait Assistance 4  Minimal assist   Additional items Assist x 1;Verbal cues; Tactile cues   Assistive Device Rolling walker  (O2 at 4L )   Distance AMb with RW 40'x1  O2 sat resting 94% on 4L and p ambulation 91% on 4L    Balance   Static Sitting Fair +   Dynamic Sitting Fair   Static Standing Fair -   Dynamic Standing Poor +   Ambulatory Poor +   Endurance Deficit   Endurance Deficit Yes   Endurance Deficit Description fatigue, weakness   Activity Tolerance   Activity Tolerance Patient tolerated treatment well;Patient limited by fatigue   Medical Staff Made Aware Nurse, Eric Gamez  Nurse Made Aware yes   Assessment   Prognosis Good   Problem List Decreased strength;Decreased endurance; Impaired balance;Decreased mobility; Decreased cognition; Impaired judgement;Decreased safety awareness   Assessment Lily Johnson is a 82 y o  male with a PMH of COPD, CHF, hypertension, chronic respiratory failure requiring chronic O2 at 2L, who presents with shortness of breath  Admitted with acute on chronic CHF  PT consulted  Up as tolerated orders  Prior to admission resides with wife in 2nd floor apt with [address]  In home independent without AD use, outside of apt using RW v cane  Wife home and supportive providing assistance for ADLS and IADLs as needed  Home O2 at 2L  Currently presents with functional limitations related to decreased activity tolerance with increased O2 demand from baseline now at 4L  Decreased general strength, impaired posture, balance, functional mobility and locomotion  Carlos needed for transfers and short distance ambulation  On 4L resting O2 sat 94%, p activity 91%  Gait slowed, decreased foot clearance, flexed posture  Cues to remain inside RICHARD of RW with turning  Would rec continued use to optimize balance    Risk for falls given impairments and will benefit from skilled PT in order to achieve mobility goals  The patient's AM-PAC Basic Mobility Inpatient Short Form Raw Score is 17  A Raw score of greater than 16 suggests the patient may benefit from discharge to home  Please also refer to the recommendation of the Physical Therapist for safe discharge planning  Anticipate ability to return home if has 24* S/assistance, however STR may be beneficial in order to optimize functional outcomes  Will follow and progress  Barriers to Discharge Inaccessible home environment   Barriers to Discharge Comments [address]   Goals   Patient Goals go home   STG Expiration Date 03/23/22   Short Term Goal #1 10 days: 1)  Pt will perform bed mobility with Kaitlynn demonstrating appropriate technique 100% of the time in order to improve function  2)  Perform all transfers with Kaitlynn demonstrating safe and appropriate technique 100% of the time in order to improve ability to negotiate safely in home environment  3) Amb with least restrictive AD > 100'x1 with mod I in order to demonstrate ability to negotiate in home environment  4)  Improve overall strength and balance 1/2 grade in order to optimize ability to perform functional tasks and reduce fall risk  5) Increase activity tolerance to 30 minutes in order to improve endurance to functional tasks  6)  Negotiate stairs using most appropriate technique and [place] in order to be able to negotiate safely into home environment  7) PT for ongoing patient and family/caregiver education, DME needs and d/c planning in order to promote highest level of function in least restrictive environment  Plan   Treatment/Interventions Functional transfer training;LE strengthening/ROM; Elevations; Therapeutic exercise; Endurance training;Patient/family training;Equipment eval/education; Bed mobility;Gait training; Compensatory technique education;Continued evaluation;Spoke to nursing;Family   PT Frequency 3-5x/wk   Recommendation   Equipment Recommended Walker  (pt has)   AM-PAC Basic Mobility Inpatient   Turning in Bed Without Bedrails 3   Lying on Back to Sitting on Edge of Flat Bed 3   Moving Bed to Chair 3   Standing Up From Chair 3   Walk in Room 3   Climb 3-5 Stairs 2   Basic Mobility Inpatient Raw Score 17   Basic Mobility Standardized Score 39 67   Highest Level Of Mobility   -[facility] Goal 5: Stand one or more mins   -HL Highest Level of Mobility 7: Walk 25 feet or more   -HL Goal Achieved Yes   End of Consult   Patient Position at End of Consult Seated edge of bed;Bed/Chair alarm activated; All needs within reach     Hx/personal factors: co-morbidities, inaccessible home, advanced age, mutliple lines, telemetry, use of AD, dec cognition, fall risk, assist w/ ADL's and O2  Examination: dec mobility, dec balance, dec endurance, dec amb, risk for falls, dec cognition, assessed body system, balance, endurance, amb, D/C disposition & fall risk, impairements in locomotion, musculoskeletal, balance, endurance, posture, coordination  Clinical: unpredictable (ongoing medical status, abnormal lab values, risk for falls and consult pending), increased O2 from baseline    Complexity: high      Lutricia Edu, PT

## 2022-03-13 NOTE — PROGRESS NOTES
[facility]  Progress Note Allan Bassett [birth year], 82 y o  male MRN: [id number]  Unit/Bed#: E4 -01 Encounter: [id number]  Primary Care Provider: Lala Payan MD   Date and time admitted to hospital: 3/12/2022  3:31 PM    * Acute on chronic diastolic congestive heart failure ([address] )  Assessment & Plan  Wt Readings from Last 3 Encounters:   03/13/22 61 6 kg (135 lb 12 9 oz)   11/09/21 67 kg (147 lb 12 8 oz)   08/11/21 64 kg (141 lb 1 5 oz)     · This is an 72-year-old male with history of COPD on chronic oxygen, CHF, hypertension presenting with dyspnea   ·  BNP 4000  · CXR with mild vascular congestion  · Repeat echocardiogram  · Cardiology consultation appreciated, will re-dose furosemide 40 mg IV x1  · Monitor I&O, daily weight, fluid restriction      Lightheadedness  Assessment & Plan  · Patient also complains of intermittent lightheadedness with exertion  · CT head negative for any acute intracranial abnormality  · PT/OT consult    Chronic respiratory failure with hypoxia (HCC)  Assessment & Plan  · History of chronic respiratory failure maintained outpatient on 2 L nasal cannula for COPD  · Continue while inpatient    Thoracic aortic aneurysm [facility])  Assessment & Plan  · Status post TEVAR in 2018    Benign essential hypertension  Assessment & Plan  · Continue Lopressor, losartan, HCTZ and Norvasc          VTE Pharmacologic Prophylaxis:   Pharmacologic:  Lovenox    Patient Centered Rounds: I have performed bedside rounds with nursing staff today  Education and Discussions with Family / Patient:  Updated family at bedside    Time Spent for Care: 20 minutes  More than 50% of total time spent on counseling and coordination of care as described above      Current Length of Stay: 1 day(s)    Current Patient Status: Inpatient   Certification Statement: The patient will continue to require additional inpatient hospital stay due to IV diuretics    Discharge Plan / Estimated Discharge Date: TBD    Code Status: Level 1 - Full Code      Subjective:   Patient seen and examined at bedside, resting comfortably, currently denies any chest pain    Objective:     Vitals:   Temp (24hrs), Av °F (36 7 °C), Min:97 7 °F (36 5 °C), Max:98 5 °F (36 9 °C)    Temp:  [97 7 °F (36 5 °C)-98 5 °F (36 9 °C)] 97 8 °F (36 6 °C)  HR:  [57-76] 66  Resp:  [16-40] 18  BP: (103-172)/(61-81) 124/61  SpO2:  [91 %-96 %] 96 %  Body mass index is 22 6 kg/m²  Input and Output Summary (last 24 hours): Intake/Output Summary (Last 24 hours) at 3/13/2022 1549  Last data filed at 3/13/2022 1301  Gross per 24 hour   Intake 960 ml   Output 1125 ml   Net -165 ml       Physical Exam:    Constitutional: Patient is in no acute distress  HEENT:  Normocephalic, atraumatic  Cardiovascular: Normal S1S2, RRR, No murmurs/rubs/gallops appreciated  Pulmonary:  Bilateral air entry, coarse breath sounds bilaterally  Abdominal: Soft, Bowel sounds present, Non-tender, Non-distended  Extremities:  No cyanosis, clubbing or edema  Neurological: Cranial nerves II-XII grossly intact, sensation intact, otherwise no focal neurological symptoms  Skin:  Warm, dry    Additional Data:     Labs:    Results from last 7 days   Lab Units 22  1555 22  1555   WBC Thousand/uL 6 83   < > 7 15   HEMOGLOBIN g/dL 12 3   < > 12 3   HEMATOCRIT % 43 7   < > 44 1   PLATELETS Thousands/uL 145*   < > 155   NEUTROS PCT %  --   --  53   LYMPHS PCT %  --   --  24   MONOS PCT %  --   --  14*   EOS PCT %  --   --  8*    < > = values in this interval not displayed       Results from last 7 days   Lab Units 22  0522  1555 22  1555   POTASSIUM mmol/L 4 3   < > 4 1   CHLORIDE mmol/L 103   < > 105   CO2 mmol/L 41*   < > 40*   BUN mg/dL 22   < > 23   CREATININE mg/dL 1 09   < > 1 13   CALCIUM mg/dL 8 5   < > 8 9   ALK PHOS U/L  --   --  79   ALT U/L  --   --  40   AST U/L  --   --  48*    < > = values in this interval not displayed  I Have Reviewed All Lab Data Listed Above          Recent Cultures (last 7 days):           Last 24 Hours Medication List:   Current Facility-Administered Medications   Medication Dose Route Frequency Provider Last Rate    acetaminophen  650 mg Oral Q6H PRN Melburn Akash, PA-C      albuterol  2 puff Inhalation Q4H PRN Melburn Aaksh, PA-C      amLODIPine  5 mg Oral Daily Melburn Akash, PA-C      aspirin  81 mg Oral Daily Melburn Akash, PA-C      atorvastatin  10 mg Oral Daily With Dinner Melburn Akash, PA-C      budesonide-formoterol  2 puff Inhalation BID Melburn Akash, PA-C      calcium carbonate  1,000 mg Oral TID PRN Melburn Akash, PA-C      enoxaparin  40 mg Subcutaneous Daily Melburn Akash, PA-C      ferrous sulfate  325 mg Oral Daily With Breakfast Melburn Akash, PA-C      hydrochlorothiazide  12 5 mg Oral Daily Melburn Akash, PA-C      losartan  100 mg Oral Daily Melburn Akash, PA-C      metoprolol tartrate  50 mg Oral Q12H [address] Melburn Akash, PA-C      ondansetron  4 mg Intravenous Q6H PRN Melburn Akash, PA-C      pantoprazole  40 mg Oral Early Morning Melburn Akash, PA-C      sodium chloride (PF)  3 mL Intravenous Q1H PRN Silver Jace Renninger, JEREMIE      umeclidinium bromide  1 puff Inhalation Daily Melburn Akash, JEERMIE          Today, Patient Was Seen By: Christiano Rodgers MD

## 2022-03-13 NOTE — UTILIZATION REVIEW
Initial Clinical Review    Admission: Date/Time/Statement:   Admission Orders (From admission, onward)     Ordered        03/12/22 1827  INPATIENT ADMISSION  Once                      Orders Placed This Encounter   Procedures    INPATIENT ADMISSION     Standing Status:   Standing     Number of Occurrences:   1     Order Specific Question:   Level of Care     Answer:   Med Surg [16]     Order Specific Question:   Estimated length of stay     Answer:   More than 2 Midnights     Order Specific Question:   Certification     Answer:   I certify that inpatient services are medically necessary for this patient for a duration of greater than two midnights  See H&P and MD Progress Notes for additional information about the patient's course of treatment  ED Arrival Information     Expected Arrival Acuity    - 3/12/2022 15:30 Urgent         Means of arrival Escorted by Service Admission type    Stretcher Self Hospitalist Urgent         Arrival complaint    sob        Chief Complaint   Patient presents with    Shortness of Breath     pt reports SOB beginning two days ago along with cough and CP  pt on 2L at home with hx COPD  · Initial Presentation: 83 yo male with a PMH of COPD, CHF, hypertension to ED from home admitted Inpatient d/t Acute on chronic diastolic CHF  Not maintained on any outpatient diuretics other than HCTZ  Presents with sob  complains of intermittent lightheadedness with exertion  elevated BNP and congestion seen on chest x-ray  Repeat echo  IV lasix  daily weights  orthostatic vital signs  PT/OT  · 3/13 CARDIOLOGY CONSULT:Acute on chronic diastolic congestive heart failure  LVEF 63%, moderate concentric LVH, mild AS, mild TR, trace IA, 3/22/19  Negative cardiac enzymes  Chest x-ray upon admission revealed mild vascular congestion, correlate for mild CHF; NT pro BNP 4004  S/P furosemide 40 mg IV x 1 upon arrival and x 1 today  provide another dose of IV diuretics today  amlodipine, hydrochlorothiazide, losartan, metoprolol tartrate, asa  Atorvastatin  orthostatic vital signs  Repeat echo  strict intake/output, daily weight  · Date: 3/13   Day 2: resting comfortably in bed  states his breathing has improved  Clinically still appears volume overloaded  coarse breath sounds bilaterally  Chest x-ray does appear congested   check new chest x-ray tomorrow  Repeat echo ordered  Cardiac enzymes negative  IV Lasix  amlodipine, hydrochlorothiazide, losartan, metoprolol, aspirin, Lipitor        ED Triage Vitals   Temperature Pulse Respirations Blood Pressure SpO2   03/12/22 1548 03/12/22 1536 03/12/22 1536 03/12/22 1536 03/12/22 1536   98 2 °F (36 8 °C) 68 16 (!) 183/81 (!) 82 %      Temp Source Heart Rate Source Patient Position - Orthostatic VS BP Location FiO2 (%)   03/12/22 1548 03/12/22 1536 03/12/22 1536 03/12/22 1536 --   Oral Monitor Lying Right arm       Pain Score       03/12/22 1536       No Pain          Wt Readings from Last 1 Encounters:   03/13/22 61 6 kg (135 lb 12 9 oz)     03/12/22 69 kg (152 lb 1 9 oz)   11/09/21 67 kg (147 lb 12 8 oz)   08/11/21 64 kg (141 lb 1 5 oz)       Additional Vital Signs:   03/13/22 1101 97 8 °F (36 6 °C) 76 18 123/68 -- 94 % 36 4 L/min Nasal cannula Sitting   03/13/22 0700 98 5 °F (36 9 °C) 70 18 135/72 -- 93 % -- -- Nasal cannula Sitting   03/13/22 0300 97 7 °F (36 5 °C) 57 18 144/70 99 91 % -- -- -- Lying   03/12/22 2303 98 2 °F (36 8 °C) 66 18 146/76 97 92 % 36 4 L/min Nasal cannula Lying   03/12/22 2003 98 2 °F (36 8 °C) 72 18 163/77 111 91 % 32 3 L/min Nasal cannula Lying   03/12/22 1945 -- 68 16 103/73 -- 93 % 32 3 L/min Nasal cannula Lying   03/12/22 1905 -- 65 16 143/70 -- 96 % 32 3 L/min Nasal cannula Sitting   03/12/22 1704 -- 65 40 Abnormal  172/81 Abnormal  -- 96 % 32 3 L/min None (Room air) Sitting   03/12/22 1548 98 2 °F (36 8 °C) 65 16 165/76 -- 98 % 32 3 L/min Nasal cannula Lying   03/12/22 1540 -- -- -- -- -- 95 % 32 3 L/min Nasal cannula -- 03/12/22 1536 -- 68 16 183/81 Abnormal  -- 82 % Abnormal  28 2 L/min Nasal cannula Lying       Pertinent Labs/Diagnostic Test Results:     3/12 EKG:  Normal sinus rhythm  Septal infarct (cited on or before 05-AUG-2021)  Abnormal ECG  When compared with ECG of 12-MAR-2022 15:45,  No significant change was found  Confirmed by Betty Dennison ([id number]) on 3/12/2022 10:30:06 PM    Normal sinus rhythm  Septal infarct (cited on or before 05-AUG-2021)  Abnormal ECG  When compared with ECG of 05-AUG-2021 10:12,  No significant change was found  Confirmed by Betty Dennison ([id number]) on 3/12/2022 4:01:53 PM        VAS lower limb venous duplex study, unilateral/limited   Final Result by Radha Germain MD (03/12 2103)      CT head without contrast   Final Result by Shayla Lam MD (03/12 1652)      No acute intracranial abnormality  Microangiopathic changes  Workstation performed: [id number]         X-ray chest 1 view portable   ED Interpretation by Omar Lai PA-C (03/12 1627)   Increased pulmonary vascular congestion      Final Result by Katia Velasquez MD (03/13 4493)      Mild vascular congestion, correlate for mild CHF                    Workstation performed: [id number]               Results from last 7 days   Lab Units 03/13/22  0512 03/12/22  1555   WBC Thousand/uL 6 83 7 15   HEMOGLOBIN g/dL 12 3 12 3   HEMATOCRIT % 43 7 44 1   PLATELETS Thousands/uL 145* 155   NEUTROS ABS Thousands/µL  --  3 85         Results from last 7 days   Lab Units 03/13/22  0512 03/12/22  1555   SODIUM mmol/L 145 144   POTASSIUM mmol/L 4 3 4 1   CHLORIDE mmol/L 103 105   CO2 mmol/L 41* 40*   ANION GAP mmol/L 1* -1*   BUN mg/dL 22 23   CREATININE mg/dL 1 09 1 13   EGFR ml/min/1 73sq m 61 59   CALCIUM mg/dL 8 5 8 9   MAGNESIUM mg/dL  --  2 5     Results from last 7 days   Lab Units 03/12/22  1555   AST U/L 48*   ALT U/L 40   ALK PHOS U/L 79   TOTAL PROTEIN g/dL 7 9   ALBUMIN g/dL 3 3*   TOTAL BILIRUBIN mg/dL 0 64         Results from last 7 days   Lab Units 03/13/22  0512 03/12/22  1555   GLUCOSE RANDOM mg/dL 83 116       Results from last 7 days   Lab Units 03/12/22  2038 03/12/22  1755 03/12/22  1555   HS TNI 0HR ng/L  --   --  19   HS TNI 2HR ng/L  --  21  --    HSTNI D2 ng/L  --  2  --    HS TNI 4HR ng/L 22  --   --    HSTNI D4 ng/L 3  --   --        Results from last 7 days   Lab Units 03/12/22  1555   NT-PRO BNP pg/mL 4,004*       Results from last 7 days   Lab Units 03/12/22  1848   CLARITY UA  Clear   COLOR UA  Yellow   SPEC GRAV UA  1 010   PH UA  6 5   GLUCOSE UA mg/dl Negative   KETONES UA mg/dl Negative   BLOOD UA  Small*   PROTEIN UA mg/dl Negative   NITRITE UA  Negative   BILIRUBIN UA  Negative   UROBILINOGEN UA E U /dl 0 2   LEUKOCYTES UA  Negative   WBC UA /hpf 2-4   RBC UA /hpf 2-4   BACTERIA UA /hpf None Seen   EPITHELIAL CELLS WET PREP /hpf None Seen       ED Treatment:   Medication Administration from 03/12/2022 1530 to 03/12/2022 1959       Date/Time Order Dose Route Action Action by Comments     03/12/2022 1702 furosemide (LASIX) injection 40 mg 40 mg Intravenous Given          Past Medical History:   Diagnosis Date    Acute metabolic encephalopathy 0/37/2534    Appendicolith     Ascending aortic aneurysm (HCC)     3 7    Asthma     BPH (benign prostatic hyperplasia)     CAD (coronary artery disease)     noted on CT scan    COPD (chronic obstructive pulmonary disease) (HCC)     Descending thoracic aortic aneurysm ([address] )     Diverticulosis     Former tobacco use     GERD (gastroesophageal reflux disease)     History of DVT (deep vein thrombosis)     Left leg    History of transfusion     Hypertension     Inguinal hernia     right    Nephrolithiasis     Oxygen dependent     2LNC    Prostate calculus     PVD (peripheral vascular disease) (HCC)     Ulcer     Varicose vein of leg     b/l     Present on Admission:   Benign essential hypertension   Acute on chronic diastolic congestive heart failure (HCC)   Chronic respiratory failure with hypoxia (HCC)   Thoracic aortic aneurysm (HCC)      Admitting Diagnosis: Swelling [R60 9]  CHF (congestive heart failure) (HCC) [I50 9]  SOB (shortness of breath) [R06 02]  Hypoxia [R09 02]  Swelling of left lower extremity [M79 89]  Age/Sex: 82 y o  male  Admission Orders:  Scheduled Medications:  amLODIPine, 5 mg, Oral, Daily  aspirin, 81 mg, Oral, Daily  atorvastatin, 10 mg, Oral, Daily With Dinner  budesonide-formoterol, 2 puff, Inhalation, BID  enoxaparin, 40 mg, Subcutaneous, Daily  ferrous sulfate, 325 mg, Oral, Daily With Breakfast  hydrochlorothiazide, 12 5 mg, Oral, Daily  losartan, 100 mg, Oral, Daily  metoprolol tartrate, 50 mg, Oral, Q12H KIKE  pantoprazole, 40 mg, Oral, Early Morning  umeclidinium bromide, 1 puff, Inhalation, Daily      furosemide (LASIX) injection 40 mg  Dose: 40 mg  Freq: Once Route: IV  Start: 03/13/22 0800 End: 03/13/22 0834    PRN Meds:  acetaminophen, 650 mg, Oral, Q6H PRN  albuterol, 2 puff, Inhalation, Q4H PRN  calcium carbonate, 1,000 mg, Oral, TID PRN  ondansetron, 4 mg, Intravenous, Q6H PRN  sodium chloride (PF), 3 mL, Intravenous, Q1H PRN    Tele  PT/OT  I&O  DAILY WEIGHTS  REG DIET  ORTHO BP QSHIFT  CARDIAC DIET WITH FLUID RESTRICTION 1500 ML  SCD      IP CONSULT TO CARDIOLOGY    Suad Howell RN  Network Utilization Review Department  ATTENTION: Please call with any questions or concerns to [phone] and carefully listen to the prompts so that you are directed to the right person  All voicemails are confidential   [facility] [place] all requests for admission clinical reviews, approved or denied determinations and any other requests to dedicated fax number below belonging to the campus where the patient is receiving treatment   List of dedicated fax numbers for the Facilities:  [address] [place] [address] DENIALS (Administrative/Medical Necessity) [phone]   [address] [place] [address] (Maternity/NICU/Pediatrics) 261 [facility],[address] Floor [facility] 40 125 [facility]  [phone]   [address] [facility] [place] [address] [id number] [place] [address] Akash Roby Heller 1481 [address] [facility] [address] [phone]

## 2022-03-13 NOTE — ASSESSMENT & PLAN NOTE
Wt Readings from Last 3 Encounters:   03/13/22 61 6 kg (135 lb 12 9 oz)   11/09/21 67 kg (147 lb 12 8 oz)   08/11/21 64 kg (141 lb 1 5 oz)     · This is an 19-year-old male with history of COPD on chronic oxygen, CHF, hypertension presenting with dyspnea   ·  BNP 4000  · CXR with mild vascular congestion  · Repeat echocardiogram  · Cardiology consultation appreciated, will re-dose furosemide 40 mg IV x1  · Monitor I&O, daily weight, fluid restriction

## 2022-03-13 NOTE — PLAN OF CARE
Problem: Potential for Falls  Goal: Patient will remain free of falls  Description: INTERVENTIONS:  - Educate patient/family on patient safety including physical limitations  - Instruct patient to call for assistance with activity   - Consult OT/PT to assist with strengthening/mobility   - Keep Call bell within reach  - Keep bed low and locked with side rails adjusted as appropriate  - Keep care items and personal belongings within reach  - Initiate and maintain comfort rounds  - Make Fall Risk Sign visible to staff  - Offer Toileting every  Hours, in advance of need  - Initiate/Maintain alarm  - Obtain necessary fall risk management equipment:   - Apply yellow socks and bracelet for high fall risk patients  - Consider moving patient to room near nurses station  Outcome: Progressing     Problem: PAIN - ADULT  Goal: Verbalizes/displays adequate comfort level or baseline comfort level  Description: Interventions:  - Encourage patient to monitor pain and request assistance  - Assess pain using appropriate pain scale  - Administer analgesics based on type and severity of pain and evaluate response  - Implement non-pharmacological measures as appropriate and evaluate response  - Consider cultural and social influences on pain and pain management  - Notify physician/advanced practitioner if interventions unsuccessful or patient reports new pain  Outcome: Progressing     Problem: INFECTION - ADULT  Goal: Absence or prevention of progression during hospitalization  Description: INTERVENTIONS:  - Assess and monitor for signs and symptoms of infection  - Monitor lab/diagnostic results  - Monitor all insertion sites, i e  indwelling lines, tubes, and drains  - Monitor endotracheal if appropriate and nasal secretions for changes in amount and color  - [place] appropriate cooling/warming therapies per order  - Administer medications as ordered  - Instruct and encourage patient and family to use good hand hygiene technique  - Identify and instruct in appropriate isolation precautions for identified infection/condition  Outcome: Progressing     Problem: SAFETY ADULT  Goal: Patient will remain free of falls  Description: INTERVENTIONS:  - Educate patient/family on patient safety including physical limitations  - Instruct patient to call for assistance with activity   - Consult OT/PT to assist with strengthening/mobility   - Keep Call bell within reach  - Keep bed low and locked with side rails adjusted as appropriate  - Keep care items and personal belongings within reach  - Initiate and maintain comfort rounds  - Make Fall Risk Sign visible to staff  - Offer Toileting every  Hours, in advance of need  - Initiate/Maintain alarm  - Obtain necessary fall risk management equipment:   - Apply yellow socks and bracelet for high fall risk patients  - Consider moving patient to room near nurses station  Outcome: Progressing  Goal: Maintain or return to baseline ADL function  Description: INTERVENTIONS:  -  Assess patient's ability to carry out ADLs; assess patient's baseline for ADL function and identify physical deficits which impact ability to perform ADLs (bathing, care of mouth/teeth, toileting, grooming, dressing, etc )  - Assess/evaluate cause of self-care deficits   - Assess range of motion  - Assess patient's mobility; develop plan if impaired  - Assess patient's need for assistive devices and provide as appropriate  - Encourage maximum independence but intervene and supervise when necessary  - Involve family in performance of ADLs  - Assess for home care needs following discharge   - Consider OT consult to assist with ADL evaluation and planning for discharge  - Provide patient education as appropriate  Outcome: Progressing  Goal: Maintains/Returns to pre admission functional level  Description: INTERVENTIONS:  - Perform BMAT or MOVE assessment daily    - Set and communicate daily mobility goal to care team and patient/family/caregiver  - Collaborate with rehabilitation services on mobility goals if consulted  - Perform Range of Motion  times a day  - Reposition patient every  hours  - Dangle patient  times a day  - Stand patient  times a day  - Ambulate patient  times a day  - Out of bed to chair  times a day   - Out of bed for meals  times a day  - Out of bed for toileting  - Record patient progress and toleration of activity level   Outcome: Progressing     Problem: DISCHARGE PLANNING  Goal: Discharge to home or other facility with appropriate resources  Description: INTERVENTIONS:  - Identify barriers to discharge w/patient and caregiver  - Arrange for needed discharge resources and transportation as appropriate  - Identify discharge learning needs (meds, wound care, etc )  - Arrange for interpretive services to assist at discharge as needed  - Refer to Case Management Department for coordinating discharge planning if the patient needs post-hospital services based on physician/advanced practitioner order or complex needs related to functional status, cognitive ability, or social support system  Outcome: Progressing     Problem: Knowledge Deficit  Goal: Patient/family/caregiver demonstrates understanding of disease process, treatment plan, medications, and discharge instructions  Description: Complete learning assessment and assess knowledge base    Interventions:  - Provide teaching at level of understanding  - Provide teaching via preferred learning methods  Outcome: Progressing

## 2022-03-13 NOTE — ASSESSMENT & PLAN NOTE
· Patient also complains of intermittent lightheadedness with exertion  · CT head negative for any acute intracranial abnormality  · PT/OT consult

## 2022-03-13 NOTE — CONSULTS
Consult - Cardiology   [facility] 82 y o  male MRN: [id number]  Unit/Bed#: E4 -01 Encounter: [id number]        Reason For Consult:  Acute on chronic diastolic congestive heart failure               ASSESSMENT:  Acute on chronic diastolic congestive heart failure   - LVEF 63%, moderate concentric LVH, mild AS, mild TR, trace AL, 3/22/19  Lightheadedness  Hypertension  Dyslipidemia  Chronic respiratory failure with hypoxia, on 2 L home O2  COPD  Descending thoracic aortic aneurysm, S/P TEVAR 12/27/18    PLAN/ DISCUSSION:     · Chest x-ray upon admission revealed mild vascular congestion, correlate for mild CHF; NT pro BNP 4004  S/P furosemide 40 mg IV x 1 upon arrival and x 1 today  Will provide another dose of IV diuretics today with furosemide 40 mg IV  · Currently on amlodipine 5 mg daily, hydrochlorothiazide 12 5 mg daily, losartan 100 mg daily, metoprolol tartrate 50 mg BID  · Continue aspirin 81 mg daily, atorvastatin 10 mg daily  Check fasting lipid panel in a m  · Negative cardiac enzymes with high sensitivity troponin trend 19, 21, 22   · Given lightheadedness, agree to check orthostatic vital signs for completeness  · Repeat echocardiogram ordered to assess cardiac structure and function  · Monitor volume status with strict intake/output, daily weight  · Monitor renal function electrolytes closely; maintain potassium > 4, magnesium > 2  History Of Present Illness:  33-year-old male presented to [facility] with complaints of shortness of breath  Per patient, he had been feeling generally unwell for the past 2-3 days prior to admission He endorsed associated body aches  Further, he reported sensation of lightheadedness with exertion as he walks upstairs  He reports that he had been compliant with his outpatient medications, however did stop taking his HCTZ a few days ago for unknown reason  Upon assessment evaluation, patient resting comfortably in bed   At present, patient states his breathing has improved  He denies shortness of breath, chest pain, dizziness, lightheadedness, palpitations  Per family at bedside, she felt as though the patient could be discharged and go home today  We significant cardiac history and problems enumerated above  Patient was last seen by cardiologist, Dr Ben Adler on 11/9/21  Office note reviewed; patient had been compliant with a low-sodium diet, blood pressures have been well controlled      Past Medical History:        Past Medical History:   Diagnosis Date    Acute metabolic encephalopathy 4/30/3419    Appendicolith     Ascending aortic aneurysm (HCC)     3 7    Asthma     BPH (benign prostatic hyperplasia)     CAD (coronary artery disease)     noted on CT scan    COPD (chronic obstructive pulmonary disease) (HCC)     Descending thoracic aortic aneurysm (HCC)     Diverticulosis     Former tobacco use     GERD (gastroesophageal reflux disease)     History of DVT (deep vein thrombosis)     Left leg    History of transfusion     Hypertension     Inguinal hernia     right    Nephrolithiasis     Oxygen dependent     2LNC    Prostate calculus     PVD (peripheral vascular disease) (HCC)     Ulcer     Varicose vein of leg     b/l      Past Surgical History:   Procedure Laterality Date    CARDIAC SURGERY      ESOPHAGOGASTRODUODENOSCOPY      HERNIA REPAIR Right 1/21/2019    Procedure: REPAIR HERNIA INGUINAL WITH MESH;  Surgeon: Jose Cruz Romero MD;  Location: [address] [place] [address] MAIN OR;  Service: General    INGUINAL HERNIA REPAIR Bilateral     IR TEVAR  12/27/2018    HI ENDOVASC TAA REINCL SUBCL N/A 12/27/2018    Procedure: TEVAR - endovascular thoracic aortic aneurysm repair;  Surgeon: Freddy Velasco MD;  Location:  MAIN OR;  Service: Vascular    THORACIC AORTIC ANEURYSM REPAIR  12/27/2018    VARICOSE VEIN SURGERY Bilateral     vein stripping        Allergy:        Allergies   Allergen Reactions    Penicillins Hives, Itching and Rash    Lisinopril Rash     Side pains and rash        Medications:       Prior to Admission medications    Medication Sig Start Date End Date Taking? Authorizing Provider   acetaminophen (TYLENOL) 325 mg tablet Take 2 tablets (650 mg total) by mouth every 6 (six) hours as needed for fever (temperature greater than 101 F) 12/31/18  Yes Lucas Munoz PA-C   albuterol (2 5 mg/3 mL) 0 083 % nebulizer solution Take 1 vial (2 5 mg total) by nebulization every 6 (six) hours as needed for wheezing or shortness of breath 6/26/18  Yes Estela Peñaloza MD   albuterol (VENTOLIN HFA) 90 mcg/act inhaler Inhale 2 puffs 8/8/17  Yes Historical Provider, MD   amLODIPine (NORVASC) 10 mg tablet Take 0 5 tablets (5 mg total) by mouth daily 8/10/21  Yes Damon Schilder, PA-C   aspirin (Aspirin 81) 81 mg EC tablet Take 1 tablet (81 mg total) by mouth daily 10/15/21  Yes Alicja Arredondo MD   atorvastatin (LIPITOR) 10 mg tablet Take 1 tablet (10 mg total) by mouth daily 6/16/20  Yes Colt Bright DO   ferrous sulfate 325 (65 Fe) mg tablet Take 325 mg by mouth daily with breakfast   Yes Historical Provider, MD   hydrochlorothiazide (HYDRODIURIL) 12 5 mg tablet Take 12 5 mg by mouth daily   Yes Historical Provider, MD   losartan (COZAAR) 100 MG tablet Take 100 mg by mouth daily   8/31/21  Yes Historical Provider, MD   metoprolol tartrate (LOPRESSOR) 50 mg tablet Take 1 tablet (50 mg total) by mouth every 12 (twelve) hours 6/16/20  Yes Colt Bright DO   pantoprazole (PROTONIX) 40 mg tablet Take 1 tablet (40 mg total) by mouth daily 6/16/20  Yes Colt Bright DO   tiotropium (SPIRIVA) 18 mcg inhalation capsule Place 1 capsule (18 mcg total) into inhaler and inhale daily 6/16/20  Yes Colt Bright DO   budesonide-formoterol (SYMBICORT) 160-4 5 mcg/act inhaler Inhale 2 puffs 2 (two) times a day Rinse mouth after use   6/16/20   Colt Deangelo, DO   melatonin 3 mg Take 2 tablets (6 mg total) by mouth daily at bedtime  Patient not taking: Reported on 2020   Eliazar Lynch DO       Family History:     Family History   Problem Relation Age of Onset    Tuberculosis Mother     No Known Problems Father     Cancer Sister     Diabetes Family     Hypertension Family         Social History:       Social History     Socioeconomic History    Marital status: /Civil Union     Spouse name: None    Number of children: None    Years of education: None    Highest education level: None   Occupational History    None   Tobacco Use    Smoking status: Former Smoker     Packs/day: 1 00     Years: 35 00     Pack years: 35 00     Start date:      Quit date:      Years since quittin 2    Smokeless tobacco: Never Used   Vaping Use    Vaping Use: Never used   Substance and Sexual Activity    Alcohol use: Never    Drug use: No    Sexual activity: Never   Other Topics Concern    None   Social History Narrative    ** Merged History Encounter **          Social Determinants of Health     Financial Resource Strain: Not on file   Food Insecurity: Not on file   Transportation Needs: Not on file   Physical Activity: Not on file   Stress: Not on file   Social Connections: Not on file   Intimate Partner Violence: Not on file   Housing Stability: Not on file       ROS:  Negative except otherwise aforementioned above  Remainder review of systems is negative    Exam:  General:  alert, oriented and in no distress, cooperative  Head: Normocephalic, atraumatic  Eyes:  EOMI  Pupils - equal, round, reactive to accomodation  No icterus  Normal Conjunctiva     Oropharynx: moist and normal-appearing mucosa  Neck: supple, symmetrical, trachea midline   Heart: regular rate, regular rhythm, S1, S2   Respiratory effort / Chest Inspection: unlabored, on supplemental oxygen   Lungs: diminished breath sounds bilateral bases   Abdomen: flat, normal findings: bowel sounds normal and soft, non-tender  Lower Limbs:  no pitting edema    DATA:      ECG: Normal sinus rhythm  Septal infarct (cited on or before 05-AUG-2021)  Abnormal ECG  When compared with ECG of 12-MAR-2022 15:45,  No significant change was found  Confirmed by Donato Vasquez ([id number]) on 3/12/2022 10:30:06 PM                     Echocardiogram completed on 3/22/19:         LEFT VENTRICLE:  Normal left ventricular systolic function, EF 06%  Normal left ventricular cavity size  Moderate concentric left ventricular hypertrophy  Normal left ventricular wall motion without regional wall motion abnormalities  Diastolic function  cannot be measured since the technician did not record tissue Doppler      AORTIC VALVE:  Mild aortic sclerosis      TRICUSPID VALVE:  There was mild regurgitation      PULMONIC VALVE:  There was trace regurgitation  Ischemic Testing/stress test completed on 9/5/17:  SUMMARY:  -  Stress results: There was no chest pain during stress  -  ECG conclusions: The stress ECG was negative for ischemia  -  Perfusion imaging: There were no perfusion defects   -  Gated SPECT: The calculated left ventricular ejection fraction was 65 %  Left ventricular ejection fraction was within normal limits by visual estimate  There was no left ventricular regional abnormality      IMPRESSIONS: Normal study after pharmacologic vasodilation  Myocardial perfusion imaging was normal at rest and with stress  Left ventricular systolic function was normal          Weights: Wt Readings from Last 3 Encounters:   03/13/22 61 6 kg (135 lb 12 9 oz)   11/09/21 67 kg (147 lb 12 8 oz)   08/11/21 64 kg (141 lb 1 5 oz)   , Body mass index is 22 6 kg/m²           Lab Studies:             Results from last 7 days   Lab Units 03/13/22  0512 03/12/22  1555   WBC Thousand/uL 6 83 7 15   HEMOGLOBIN g/dL 12 3 12 3   HEMATOCRIT % 43 7 44 1   PLATELETS Thousands/uL 145* 155   ,   Results from last 7 days   Lab Units 03/13/22  0512 03/12/22  1555   POTASSIUM mmol/L 4 3 4 1   CHLORIDE mmol/L 103 105   CO2 mmol/L 41* 40* BUN mg/dL 22 23   CREATININE mg/dL 1 09 1 13   CALCIUM mg/dL 8 5 8 9   ALK PHOS U/L  --  79   ALT U/L  --  40   AST U/L  --  48*

## 2022-03-13 NOTE — PLAN OF CARE
Problem: PHYSICAL THERAPY ADULT  Goal: Performs mobility at highest level of function for planned discharge setting  See evaluation for individualized goals  Description: Treatment/Interventions: Functional transfer training,LE strengthening/ROM,Elevations,Therapeutic exercise,Endurance training,Patient/family training,Equipment eval/education,Bed mobility,Gait training,Compensatory technique education,Continued evaluation,Spoke to nursing,Family  Equipment Recommended: Leonardo Salmeron (pt has)       See flowsheet documentation for full assessment, interventions and recommendations  Outcome: Progressing  Note: Prognosis: Good  Problem List: Decreased strength,Decreased endurance,Impaired balance,Decreased mobility,Decreased cognition,Impaired judgement,Decreased safety awareness  Assessment: Ignacia Hackett is a 82 y o  male with a PMH of COPD, CHF, hypertension, chronic respiratory failure requiring chronic O2 at 2L, who presents with shortness of breath  Admitted with acute on chronic CHF  PT consulted  Up as tolerated orders  Prior to admission resides with wife in 2nd floor apt with [address]  In home independent without AD use, outside of apt using RW v cane  Wife home and supportive providing assistance for ADLS and IADLs as needed  Home O2 at 2L  Currently presents with functional limitations related to decreased activity tolerance with increased O2 demand from baseline now at 4L  Decreased general strength, impaired posture, balance, functional mobility and locomotion  Carlos needed for transfers and short distance ambulation  On 4L resting O2 sat 94%, p activity 91%  Gait slowed, decreased foot clearance, flexed posture  Cues to remain inside RICHARD of RW with turning  Would rec continued use to optimize balance  Risk for falls given impairments and will benefit from skilled PT in order to achieve mobility goals  The patient's AM-PAC Basic Mobility Inpatient Short Form Raw Score is 17   A Raw score of greater than 16 suggests the patient may benefit from discharge to home  Please also refer to the recommendation of the Physical Therapist for safe discharge planning  Anticipate ability to return home if has 24* S/assistance, however STR may be beneficial in order to optimize functional outcomes  Will follow and progress  Barriers to Discharge: Inaccessible home environment  Barriers to Discharge Comments: [address] apt                See flowsheet documentation for full assessment

## 2022-03-14 ENCOUNTER — APPOINTMENT (INPATIENT)
Dept: RADIOLOGY | Facility: HOSPITAL | Age: 87
DRG: 291 | End: 2022-03-14
Payer: MEDICARE

## 2022-03-14 ENCOUNTER — APPOINTMENT (INPATIENT)
Dept: NON INVASIVE DIAGNOSTICS | Facility: HOSPITAL | Age: 87
DRG: 291 | End: 2022-03-14
Payer: MEDICARE

## 2022-03-14 LAB
ANION GAP SERPL CALCULATED.3IONS-SCNC: 3 MMOL/L (ref 4–13)
AORTIC ROOT: 3.5 CM
APICAL FOUR CHAMBER EJECTION FRACTION: 73 %
ASCENDING AORTA: 3.4 CM (ref 1.85–2.77)
AV LVOT MEAN GRADIENT: 2 MMHG
AV LVOT PEAK GRADIENT: 4 MMHG
BASOPHILS # BLD AUTO: 0.03 THOUSANDS/ΜL (ref 0–0.1)
BASOPHILS NFR BLD AUTO: 0 % (ref 0–1)
BUN SERPL-MCNC: 25 MG/DL (ref 5–25)
CALCIUM SERPL-MCNC: 8.4 MG/DL (ref 8.3–10.1)
CHLORIDE SERPL-SCNC: 99 MMOL/L (ref 100–108)
CHOLEST SERPL-MCNC: 127 MG/DL
CO2 SERPL-SCNC: 43 MMOL/L (ref 21–32)
CREAT SERPL-MCNC: 1.36 MG/DL (ref 0.6–1.3)
DOP CALC LVOT PEAK VEL VTI: 21.61 CM
DOP CALC LVOT PEAK VEL: 0.95 M/S
E WAVE DECELERATION TIME: 162 MS
EOSINOPHIL # BLD AUTO: 0.38 THOUSAND/ΜL (ref 0–0.61)
EOSINOPHIL NFR BLD AUTO: 4 % (ref 0–6)
ERYTHROCYTE [DISTWIDTH] IN BLOOD BY AUTOMATED COUNT: 15.6 % (ref 11.6–15.1)
FLUAV RNA RESP QL NAA+PROBE: NEGATIVE
FLUBV RNA RESP QL NAA+PROBE: NEGATIVE
FRACTIONAL SHORTENING: 33 % (ref 28–44)
GFR SERPL CREATININE-BSD FRML MDRD: 47 ML/MIN/1.73SQ M
GLUCOSE SERPL-MCNC: 123 MG/DL (ref 65–140)
HCT VFR BLD AUTO: 47.7 % (ref 36.5–49.3)
HDLC SERPL-MCNC: 26 MG/DL
HGB BLD-MCNC: 13.7 G/DL (ref 12–17)
IMM GRANULOCYTES # BLD AUTO: 0.03 THOUSAND/UL (ref 0–0.2)
IMM GRANULOCYTES NFR BLD AUTO: 0 % (ref 0–2)
INTERVENTRICULAR SEPTUM IN DIASTOLE (PARASTERNAL SHORT AXIS VIEW): 1.7 CM
INTERVENTRICULAR SEPTUM: 1.7 CM (ref 0.49–0.93)
IVC: 1.8 MM
LDLC SERPL CALC-MCNC: 84 MG/DL (ref 0–100)
LEFT ATRIUM AREA SYSTOLE SINGLE PLANE A4C: 14.5 CM2
LEFT ATRIUM SIZE: 2.4 CM
LEFT INTERNAL DIMENSION IN SYSTOLE: 2.4 CM (ref 2.34–3.55)
LEFT VENTRICULAR INTERNAL DIMENSION IN DIASTOLE: 3.6 CM (ref 3.81–5.67)
LEFT VENTRICULAR POSTERIOR WALL IN END DIASTOLE: 1.4 CM (ref 0.48–0.91)
LEFT VENTRICULAR STROKE VOLUME: 36 ML
LVSV (TEICH): 36 ML
LYMPHOCYTES # BLD AUTO: 0.92 THOUSANDS/ΜL (ref 0.6–4.47)
LYMPHOCYTES NFR BLD AUTO: 11 % (ref 14–44)
MCH RBC QN AUTO: 28.8 PG (ref 26.8–34.3)
MCHC RBC AUTO-ENTMCNC: 28.7 G/DL (ref 31.4–37.4)
MCV RBC AUTO: 100 FL (ref 82–98)
MONOCYTES # BLD AUTO: 1.18 THOUSAND/ΜL (ref 0.17–1.22)
MONOCYTES NFR BLD AUTO: 13 % (ref 4–12)
MV E'TISSUE VEL-SEP: 5 CM/S
MV PEAK A VEL: 0.93 M/S
MV PEAK E VEL: 61 CM/S
MV STENOSIS PRESSURE HALF TIME: 47 MS
MV VALVE AREA P 1/2 METHOD: 4.68 CM2
NEUTROPHILS # BLD AUTO: 6.25 THOUSANDS/ΜL (ref 1.85–7.62)
NEUTS SEG NFR BLD AUTO: 72 % (ref 43–75)
NRBC BLD AUTO-RTO: 0 /100 WBCS
PA SYSTOLIC PRESSURE: 62 MMHG
PLATELET # BLD AUTO: 155 THOUSANDS/UL (ref 149–390)
PMV BLD AUTO: 9.3 FL (ref 8.9–12.7)
POTASSIUM SERPL-SCNC: 3.3 MMOL/L (ref 3.5–5.3)
RA PRESSURE ESTIMATED: 3 MMHG
RBC # BLD AUTO: 4.75 MILLION/UL (ref 3.88–5.62)
RIGHT ATRIUM AREA SYSTOLE A4C: 19.3 CM2
RIGHT VENTRICLE ID DIMENSION: 4.4 CM
RSV RNA RESP QL NAA+PROBE: NEGATIVE
RV PSP: 62 MMHG
SARS-COV-2 RNA RESP QL NAA+PROBE: NEGATIVE
SL CV LV EF: 73
SL CV PED ECHO LEFT VENTRICLE DIASTOLIC VOLUME (MOD BIPLANE) 2D: 56 ML
SL CV PED ECHO LEFT VENTRICLE SYSTOLIC VOLUME (MOD BIPLANE) 2D: 20 ML
SODIUM SERPL-SCNC: 145 MMOL/L (ref 136–145)
TR MAX PG: 59 MMHG
TR PEAK VELOCITY: 3.8 M/S
TRICUSPID ANNULAR PLANE SYSTOLIC EXCURSION: 2.7 CM
TRICUSPID VALVE PEAK REGURGITATION VELOCITY: 3.84 M/S
TRIGL SERPL-MCNC: 84 MG/DL
WBC # BLD AUTO: 8.79 THOUSAND/UL (ref 4.31–10.16)
Z-SCORE OF ASCENDING AORTA: 4.72
Z-SCORE OF INTERVENTRICULAR SEPTUM IN END DIASTOLE: 8.98
Z-SCORE OF LEFT VENTRICULAR DIMENSION IN END DIASTOLE: -2.56
Z-SCORE OF LEFT VENTRICULAR DIMENSION IN END SYSTOLE: -1.45
Z-SCORE OF LEFT VENTRICULAR POSTERIOR WALL IN END DIASTOLE: 6.37

## 2022-03-14 PROCEDURE — 71045 X-RAY EXAM CHEST 1 VIEW: CPT

## 2022-03-14 PROCEDURE — 97116 GAIT TRAINING THERAPY: CPT

## 2022-03-14 PROCEDURE — 80048 BASIC METABOLIC PNL TOTAL CA: CPT | Performed by: INTERNAL MEDICINE

## 2022-03-14 PROCEDURE — 93306 TTE W/DOPPLER COMPLETE: CPT

## 2022-03-14 PROCEDURE — 97166 OT EVAL MOD COMPLEX 45 MIN: CPT

## 2022-03-14 PROCEDURE — 80061 LIPID PANEL: CPT | Performed by: NURSE PRACTITIONER

## 2022-03-14 PROCEDURE — 97530 THERAPEUTIC ACTIVITIES: CPT

## 2022-03-14 PROCEDURE — 93306 TTE W/DOPPLER COMPLETE: CPT | Performed by: INTERNAL MEDICINE

## 2022-03-14 PROCEDURE — 0241U HB NFCT DS VIR RESP RNA 4 TRGT: CPT | Performed by: PHYSICIAN ASSISTANT

## 2022-03-14 PROCEDURE — 85025 COMPLETE CBC W/AUTO DIFF WBC: CPT | Performed by: INTERNAL MEDICINE

## 2022-03-14 PROCEDURE — 99232 SBSQ HOSP IP/OBS MODERATE 35: CPT | Performed by: INTERNAL MEDICINE

## 2022-03-14 RX ORDER — GUAIFENESIN 600 MG
600 TABLET, EXTENDED RELEASE 12 HR ORAL EVERY 12 HOURS SCHEDULED
Status: DISCONTINUED | OUTPATIENT
Start: 2022-03-14 | End: 2022-03-17 | Stop reason: HOSPADM

## 2022-03-14 RX ORDER — POTASSIUM CHLORIDE 20 MEQ/1
20 TABLET, EXTENDED RELEASE ORAL 2 TIMES DAILY
Status: DISCONTINUED | OUTPATIENT
Start: 2022-03-15 | End: 2022-03-15

## 2022-03-14 RX ORDER — POTASSIUM CHLORIDE 20 MEQ/1
40 TABLET, EXTENDED RELEASE ORAL 2 TIMES DAILY
Status: DISCONTINUED | OUTPATIENT
Start: 2022-03-14 | End: 2022-03-15

## 2022-03-14 RX ORDER — FUROSEMIDE 10 MG/ML
20 INJECTION INTRAMUSCULAR; INTRAVENOUS DAILY
Status: DISCONTINUED | OUTPATIENT
Start: 2022-03-15 | End: 2022-03-15

## 2022-03-14 RX ORDER — IPRATROPIUM BROMIDE AND ALBUTEROL SULFATE 2.5; .5 MG/3ML; MG/3ML
3 SOLUTION RESPIRATORY (INHALATION) EVERY 6 HOURS PRN
Status: DISCONTINUED | OUTPATIENT
Start: 2022-03-14 | End: 2022-03-17 | Stop reason: HOSPADM

## 2022-03-14 RX ADMIN — ENOXAPARIN SODIUM 40 MG: 40 INJECTION SUBCUTANEOUS at 08:24

## 2022-03-14 RX ADMIN — UMECLIDINIUM 1 PUFF: 62.5 AEROSOL, POWDER ORAL at 08:24

## 2022-03-14 RX ADMIN — GUAIFENESIN 600 MG: 600 TABLET, EXTENDED RELEASE ORAL at 21:14

## 2022-03-14 RX ADMIN — POTASSIUM CHLORIDE 40 MEQ: 1500 TABLET, EXTENDED RELEASE ORAL at 18:32

## 2022-03-14 RX ADMIN — FERROUS SULFATE TAB 325 MG (65 MG ELEMENTAL FE) 325 MG: 325 (65 FE) TAB at 08:23

## 2022-03-14 RX ADMIN — ASPIRIN 81 MG: 81 TABLET, COATED ORAL at 08:23

## 2022-03-14 RX ADMIN — LOSARTAN POTASSIUM 100 MG: 50 TABLET, FILM COATED ORAL at 08:23

## 2022-03-14 RX ADMIN — ATORVASTATIN CALCIUM 10 MG: 10 TABLET, FILM COATED ORAL at 18:32

## 2022-03-14 RX ADMIN — BUDESONIDE AND FORMOTEROL FUMARATE DIHYDRATE 2 PUFF: 160; 4.5 AEROSOL RESPIRATORY (INHALATION) at 18:32

## 2022-03-14 RX ADMIN — SODIUM CHLORIDE 250 ML: 900 INJECTION, SOLUTION INTRAVENOUS at 21:11

## 2022-03-14 RX ADMIN — PANTOPRAZOLE SODIUM 40 MG: 40 TABLET, DELAYED RELEASE ORAL at 05:49

## 2022-03-14 RX ADMIN — METOPROLOL TARTRATE 50 MG: 50 TABLET, FILM COATED ORAL at 08:23

## 2022-03-14 RX ADMIN — AMLODIPINE BESYLATE 5 MG: 5 TABLET ORAL at 08:23

## 2022-03-14 RX ADMIN — BUDESONIDE AND FORMOTEROL FUMARATE DIHYDRATE 2 PUFF: 160; 4.5 AEROSOL RESPIRATORY (INHALATION) at 08:24

## 2022-03-14 RX ADMIN — HYDROCHLOROTHIAZIDE 12.5 MG: 12.5 TABLET ORAL at 08:23

## 2022-03-14 NOTE — ASSESSMENT & PLAN NOTE
Wt Readings from Last 3 Encounters:   03/14/22 59 9 kg (132 lb)   11/09/21 67 kg (147 lb 12 8 oz)   08/11/21 64 kg (141 lb 1 5 oz)     · This is an 24-year-old male with history of COPD on chronic oxygen, CHF, hypertension presenting with dyspnea   ·  BNP 4000  · CXR with mild vascular congestion  · Repeat echocardiogram being performed  · Cardiology consultation appreciated  · Hold diuretics today given elevated creatinine, resume Lasix 20 mg IV daily tomorrow  · Monitor I&O, daily weight, fluid restriction

## 2022-03-14 NOTE — PLAN OF CARE
Problem: OCCUPATIONAL THERAPY ADULT  Goal: Performs self-care activities at highest level of function for planned discharge setting  See evaluation for individualized goals  Description: Treatment Interventions: ADL retraining,Functional transfer training,UE strengthening/ROM,Endurance training,Cognitive reorientation,Patient/family training,Equipment evaluation/education,Compensatory technique education,Energy conservation,Activityengagement          See flowsheet documentation for full assessment, interventions and recommendations  Note: Limitation: Decreased ADL status,Decreased UE strength,Decreased Safe judgement during ADL,Decreased cognition,Decreased endurance,Decreased self-care trans,Decreased high-level ADLs  Prognosis: Good  Assessment: Pt is a 82 y o  male seen for OT evaluation s/p admit to [facility] on 3/12/2022 w/ SOB, Acute on chronic diastolic congestive heart failure ([facility] [address] )  Pt currently on 5L O2 and 2L O2 baseline  Comorbidities affecting pt's functional performance at time of assessment include: COPD, HTN, chronic respiratory failure w/ hypoxia, lightheaded, thoracic aortic aneurysm w/ h/o TAVAR in 2018  Personal factors affecting pt at time of IE include:difficulty performing ADLS, difficulty performing IADLS , flat affect and decreased initiation and engagement , Telugu speaking only, family prefers to translate  Prior to admission, pt was living w/ spouse and reports independent w/ ADLs w/ occasional assist LB ADLs, independent w/ functional transfers and mobility w/ no AD occasional use of SPC, assist w/ IADLs and transport   Upon evaluation: Pt requires supervision-min assist sit<>stand w/ VCs for hand placement and positioning, MIN assist x1 functional mobility w/ RW w/ assist line management, MIN assist LB ADLs, setup-min assist UB ADLs, MIN assist toileting and toilet transfer  2* the following deficits impacting occupational performance: decreased strength and endurance, impaired balance, impaired functional reach, impaired activity tolerance, fall risk, dyspnea on exertion  Spoke to son and spouse about pt weighing self daily at home and eating a low sodium diet as well as use of RW for energy conservation and family receptive  Pt to benefit from continued skilled OT tx while in the hospital to address deficits as defined above and maximize level of functional independence w ADL's and functional mobility  Occupational Performance areas to address include: grooming, bathing/shower, toilet hygiene, dressing, health maintenance, functional mobility and clothing management  From OT standpoint, recommendation at time of d/c would be home w/ family support/assist and HOME OT  The patient's raw score on the AM-PAC Daily Activity inpatient short form is 19, standardized score is 40 22, greater than 39 4  Patients at this level are likely to benefit from discharge to home  Please refer to the recommendation of the Occupational Therapist for safe discharge planning       OT Discharge Recommendation: Home with home health rehabilitation (HOME OT and family support/assist)  OT - OK to Discharge:  (when medically stable)

## 2022-03-14 NOTE — ASSESSMENT & PLAN NOTE
· History of chronic respiratory failure maintained outpatient on 2 L nasal cannula for COPD  · Currently on 4 L nasal cannula  · Continue to titrate oxygen to maintain saturations greater than 88%  · Continue home nebs p r n   Wheezing

## 2022-03-14 NOTE — PROGRESS NOTES
[facility]  Progress Note Raji Ayon [birth year], 82 y o  male MRN: [id number]  Unit/Bed#: E4 -01 Encounter: [id number]  Primary Care Provider: Jerardo Warren MD   Date and time admitted to hospital: 3/12/2022  3:31 PM    * Acute on chronic diastolic congestive heart failure ([address] )  Assessment & Plan  Wt Readings from Last 3 Encounters:   03/14/22 59 9 kg (132 lb)   11/09/21 67 kg (147 lb 12 8 oz)   08/11/21 64 kg (141 lb 1 5 oz)     · This is an 63-year-old male with history of COPD on chronic oxygen, CHF, hypertension presenting with dyspnea   ·  BNP 4000  · CXR with mild vascular congestion  · Repeat echocardiogram being performed  · Cardiology consultation appreciated  · Hold diuretics today given elevated creatinine, resume Lasix 20 mg IV daily tomorrow  · Monitor I&O, daily weight, fluid restriction      Lightheadedness  Assessment & Plan  · Patient also complains of intermittent lightheadedness with exertion  · CT head negative for any acute intracranial abnormality  · PT/OT consult appreciated    Chronic respiratory failure with hypoxia (HCC)  Assessment & Plan  · History of chronic respiratory failure maintained outpatient on 2 L nasal cannula for COPD  · Currently on 4 L nasal cannula  · Continue to titrate oxygen to maintain saturations greater than 88%  · Continue home nebs p r n   Wheezing    Thoracic aortic aneurysm (HCC)  Assessment & Plan  · Status post TEVAR in 2018    Benign essential hypertension  Assessment & Plan  · Continue Lopressor, Norvasc  · Hold losartan, hydrochlorothiazide with elevated creatinine      VTE Pharmacologic Prophylaxis:  Lovenox    Patient Centered Rounds:  Patient care rounds were performed with nursing    Discussions with Specialists or Other Care Team Provider:  Cardiology    Education and Discussions with Family / Patient:  Discussed with family at the bedside    Time Spent for Care: 30  More than 50% of total time spent on counseling and coordination of care as described above  Current Length of Stay: 2 day(s)    Current Patient Status: Inpatient   Certification Statement: The patient will continue to require additional inpatient hospital stay due to ongoing management of heart failure    Discharge Plan:  Ongoing management of heart failure, discharge when euvolemic and renal function stable    Code Status: Level 1 - Full Code      Subjective:   Patient seen evaluated at bedside  He feels well  Feels improved  Objective:     Vitals:   Temp (24hrs), Av °F (36 7 °C), Min:97 6 °F (36 4 °C), Max:98 3 °F (36 8 °C)    Temp:  [97 6 °F (36 4 °C)-98 3 °F (36 8 °C)] 98 3 °F (36 8 °C)  HR:  [61-84] 61  Resp:  [18] 18  BP: (100-151)/(57-70) 100/63  SpO2:  [91 %-97 %] 94 %  Body mass index is 21 97 kg/m²  Input and Output Summary (last 24 hours): Intake/Output Summary (Last 24 hours) at 3/14/2022 1433  Last data filed at 3/14/2022 0501  Gross per 24 hour   Intake 340 ml   Output 825 ml   Net -485 ml       Physical Exam:     Physical Exam  Vitals reviewed  Constitutional:       General: He is not in acute distress  Appearance: He is well-developed  He is not ill-appearing, toxic-appearing or diaphoretic  HENT:      Head: Normocephalic and atraumatic  Mouth/Throat:      Mouth: Mucous membranes are moist       Pharynx: No oropharyngeal exudate  Eyes:      General: No scleral icterus  Extraocular Movements: Extraocular movements intact  Conjunctiva/sclera: Conjunctivae normal    Cardiovascular:      Rate and Rhythm: Normal rate and regular rhythm  Heart sounds: Normal heart sounds  Pulmonary:      Effort: Pulmonary effort is normal  No respiratory distress  Breath sounds: Normal breath sounds  No wheezing or rales  Comments: Decreased breath sounds bilateral bases  Abdominal:      General: There is no distension  Palpations: Abdomen is soft  Tenderness:  There is no abdominal tenderness  There is no guarding or rebound  Musculoskeletal:         General: No swelling, tenderness or deformity  Skin:     General: Skin is warm and dry  Neurological:      General: No focal deficit present  Mental Status: He is alert  Mental status is at baseline  Psychiatric:         Mood and Affect: Mood normal          Behavior: Behavior normal          Thought Content: Thought content normal          Judgment: Judgment normal          Additional Data:     Labs: I have reviewed pertinent results     Results from last 7 days   Lab Units 03/14/22  0634   WBC Thousand/uL 8 79   HEMOGLOBIN g/dL 13 7   HEMATOCRIT % 47 7   PLATELETS Thousands/uL 155   NEUTROS PCT % 72   LYMPHS PCT % 11*   MONOS PCT % 13*   EOS PCT % 4     Results from last 7 days   Lab Units 03/14/22  0634 03/13/22  0512 03/12/22  1555   SODIUM mmol/L 145   < > 144   POTASSIUM mmol/L 3 3*   < > 4 1   CHLORIDE mmol/L 99*   < > 105   CO2 mmol/L 43*   < > 40*   BUN mg/dL 25   < > 23   CREATININE mg/dL 1 36*   < > 1 13   ANION GAP mmol/L 3*   < > -1*   CALCIUM mg/dL 8 4   < > 8 9   ALBUMIN g/dL  --   --  3 3*   TOTAL BILIRUBIN mg/dL  --   --  0 64   ALK PHOS U/L  --   --  79   ALT U/L  --   --  40   AST U/L  --   --  48*   GLUCOSE RANDOM mg/dL 123   < > 116    < > = values in this interval not displayed                           Imaging: I have reviewed pertinent imaging       Recent Cultures (last 7 days):           Last 24 Hours Medication List:   Current Facility-Administered Medications   Medication Dose Route Frequency Provider Last Rate    acetaminophen  650 mg Oral Q6H PRN Dirk Negus, PA-C      albuterol  2 puff Inhalation Q4H PRN Dirk Negus, PA-C      amLODIPine  5 mg Oral Daily Dirk Negus, PA-C      aspirin  81 mg Oral Daily Dirk Negus, PA-C      atorvastatin  10 mg Oral Daily With Dinner Dirk Negus, PA-C      budesonide-formoterol  2 puff Inhalation BID Dirk Negus, PA-C      calcium carbonate  1,000 mg Oral TID PRN Minus JEREMIE Bradford      enoxaparin  40 mg Subcutaneous Daily Minus JEREMIE Bradford      ferrous sulfate  325 mg Oral Daily With Breakfast Minus JEREMIE Bradford      [START ON 3/15/2022] furosemide  20 mg Intravenous Daily Lani Pope MD      ipratropium-albuterol  3 mL Nebulization Q6H PRN Carlos Ramirez DO      metoprolol tartrate  50 mg Oral Q12H [address] Minus JEREMIE Bradford      ondansetron  4 mg Intravenous Q6H PRN Minus JEREMIE Bradford      pantoprazole  40 mg Oral Early Morning Minus JEREMIE Bradford      [START ON 3/15/2022] potassium chloride  20 mEq Oral BID Lani Pope MD      potassium chloride  40 mEq Oral BID Lani Pope MD      sodium chloride (PF)  3 mL Intravenous Q1H PRN Minda Taylor PA-C      umeclidinium bromide  1 puff Inhalation Daily Minus JEREMIE Bradford          Today, Patient Was Seen By: Carlos Ramirez DO    ** Please Note: Dictation voice to text software may have been used in the creation of this document   **

## 2022-03-14 NOTE — OCCUPATIONAL THERAPY NOTE
Occupational Therapy Evaluation     Patient Name: Ruben MCKEON Date: 3/14/2022  Problem List  Principal Problem:    Acute on chronic diastolic congestive heart failure ([facility] [address] )  Active Problems:    Benign essential hypertension    Thoracic aortic aneurysm (HCC)    Chronic respiratory failure with hypoxia (HCC)    Lightheadedness    Past Medical History  Past Medical History:   Diagnosis Date    Acute metabolic encephalopathy 6/74/0550    Appendicolith     Ascending aortic aneurysm (HCC)     3 7    Asthma     BPH (benign prostatic hyperplasia)     CAD (coronary artery disease)     noted on CT scan    COPD (chronic obstructive pulmonary disease) (HCC)     Descending thoracic aortic aneurysm ([facility] )     Diverticulosis     Former tobacco use     GERD (gastroesophageal reflux disease)     History of DVT (deep vein thrombosis)     Left leg    History of transfusion     Hypertension     Inguinal hernia     right    Nephrolithiasis     Oxygen dependent     2LNC    Prostate calculus     [facility]peripheral vascular disease) ([facility])     Ulcer     Varicose vein of leg     b/l     Past Surgical History  Past Surgical History:   Procedure Laterality Date    CARDIAC SURGERY      ESOPHAGOGASTRODUODENOSCOPY      HERNIA REPAIR Right 1/21/2019    Procedure: REPAIR HERNIA INGUINAL WITH MESH;  Surgeon: Jose Cruz Romero MD;  Location: [address] [place] [address] MAIN OR;  Service: General    INGUINAL HERNIA REPAIR Bilateral     IR TEVAR  12/27/2018    VT ENDOVASC TAA REINCL SUBCL N/A 12/27/2018    Procedure: TEVAR - endovascular thoracic aortic aneurysm repair;  Surgeon: Adelaide Ortega MD;  Location:  MAIN OR;  Service: Vascular    THORACIC AORTIC ANEURYSM REPAIR  12/27/2018    VARICOSE VEIN SURGERY Bilateral     vein stripping             03/14/22 1028   OT Last Visit   OT Visit Date 03/14/22   Note Type   Note type Evaluation   Restrictions/Precautions   Weight Bearing Precautions Per Order No   Other Precautions Bed Alarm;Chair Alarm;Multiple lines;Telemetry;O2;Fall Risk  (5L O2, Armenian speaking only)   Pain Assessment   Pain Assessment Tool 0-10   Pain Score No Pain   Home Living   Type of Home Apartment   Home Layout One level; Able to live on main level with bedroom/bathroom; Performs ADLs on one level;Stairs to enter with rails  ([address])   Bathroom Shower/Tub Walk-in shower   Bathroom Toilet Standard   Bathroom Equipment Shower chair;Grab bars in shower   [address] Walker;Cane  (2 LO2)   Additional Comments pt son on phone reports pt no use of DME in home, occaisionally will use a SPC   Prior Function   Level of [place] Independent with ADLs and functional mobility   Lives With Spouse   Receives Help From Family   ADL Assistance Independent  (sometimes needs help w/ LB from spouse)   IADLs Needs assistance   Falls in the last 6 months 0   Vocational Retired   Comments pt wife and son does cooking/cleaning/laundry and management of medications; pt  (-) alone if spouse not home, son is there to assist him   Lifestyle   Autonomy per pt independent w/ ADLs w/ occaisional assist for LB ADLs, independent w/ functional transfers and mobility w/ no AD or SPC, assist w/ IADLs   Reciprocal Relationships spouse and son   Service to Others retired   Intrinsic Gratification watching tv   [address] "[address] [facility]"   ADL   Where Akash Desir [phone]  [address] [facility] 5  [address] [place] [address]  5  [address] [facility] 5  [address] 4  Minimal Assistance   Bed Mobility   Supine to [address]   Additional items HOB elevated; Bedrails; Increased time required;Verbal cues   Transfers   Sit to Stand 4  Minimal assistance   Additional items Assist x 1; Increased time required;Verbal cues;Armrests; Bedrails   Stand to Sit 4  Minimal assistance   Additional items Assist x 1; Increased time required;Verbal cues;Armrests   Toilet transfer 4  Minimal assistance   Additional items Assist x 1; Increased time required;Verbal cues;Standard toilet  (grab bar use)   Additional Comments cues for hand placement and controlled descent; initially min assist improved to close supervision for transfers w/ VCs   Functional Mobility   Functional Mobility 4  Minimal assistance   Additional Comments assist x1 w/ O2 line management and safety   Additional items Rolling walker   Balance   Static Sitting Good   Dynamic Sitting Fair +   Static Standing Fair   Dynamic Standing Fair -   Ambulatory Poor +   Activity Tolerance   Activity Tolerance Patient limited by fatigue;Treatment limited secondary to medical complications (Comment)   Nurse Made Aware appropriate to see per Lorenzo PAYAN   RUE Assessment   RUE Assessment WFL  (4-/5)   LUE Assessment   LUE Assessment WFL  (4-/5)   Hand Function   Gross Motor Coordination Functional   Fine Motor Coordination Functional   Sensation   Light Touch No apparent deficits   Proprioception   Proprioception No apparent deficits   Vision-Basic Assessment   Current Vision Wears glasses all the time   Vision - Complex Assessment   Ocular Range of Motion [facility]   Acuity Able to read clock/calendar on wall without difficulty   Perception   Inattention/Neglect Appears intact   Cognition   Overall Cognitive Status [facility]   Arousal/Participation Alert; Cooperative   Attention Within functional limits   Orientation Level Oriented to person;Oriented to place  (not specific date)   Memory Decreased short term memory;Decreased recall of precautions   Following Commands Follows one step commands with increased time or repetition   Comments pt Afghan speaking, increased processing time; pt spouse present and translating some and spoke to pt son on phone Assessment   Limitation Decreased ADL status; Decreased UE strength;Decreased Safe judgement during ADL;Decreased cognition;Decreased endurance;Decreased self-care trans;Decreased high-level ADLs   Prognosis Good   Assessment Pt is a 82 y o  male seen for OT evaluation s/p admit to [facility] on 3/12/2022 w/ SOB, Acute on chronic diastolic congestive heart failure ([facility] )  Pt currently on 5L O2 and 2L O2 baseline  Comorbidities affecting pt's functional performance at time of assessment include: COPD, HTN, chronic respiratory failure w/ hypoxia, lightheaded, thoracic aortic aneurysm w/ h/o TAVAR in 2018  Personal factors affecting pt at time of IE include:difficulty performing ADLS, difficulty performing IADLS , flat affect and decreased initiation and engagement , Occitan speaking only, family prefers to translate  Prior to admission, pt was living w/ spouse and reports independent w/ ADLs w/ occasional assist LB ADLs, independent w/ functional transfers and mobility w/ no AD occasional use of SPC, assist w/ IADLs and transport  Upon evaluation: Pt requires supervision-min assist sit<>stand w/ VCs for hand placement and positioning, MIN assist x1 functional mobility w/ RW w/ assist line management, MIN assist LB ADLs, setup-min assist UB ADLs, MIN assist toileting and toilet transfer  2* the following deficits impacting occupational performance: decreased strength and endurance, impaired balance, impaired functional reach, impaired activity tolerance, fall risk, dyspnea on exertion  Spoke to son and spouse about pt weighing self daily at home and eating a low sodium diet as well as use of RW for energy conservation and family receptive  Pt to benefit from continued skilled OT tx while in the hospital to address deficits as defined above and maximize level of functional independence w ADL's and functional mobility   Occupational Performance areas to address include: grooming, bathing/shower, toilet hygiene, dressing, health maintenance, functional mobility and clothing management  From OT standpoint, recommendation at time of d/c would be home w/ family support/assist and HOME OT  The patient's raw score on the AM-PAC Daily Activity inpatient short form is 19, standardized score is 40 22, greater than 39 4  Patients at this level are likely to benefit from discharge to home  Please refer to the recommendation of the Occupational Therapist for safe discharge planning  Goals   Patient Goals "go home"   LTG Time Frame 10-14   Long Term Goal please see below goals   Plan   Treatment Interventions ADL retraining;Functional transfer training;UE strengthening/ROM; Endurance training;Cognitive reorientation;Patient/family training;Equipment evaluation/education; Compensatory technique education; Energy conservation; Activityengagement   Goal Expiration Date 03/28/22   OT Frequency 2-3x/wk   Recommendation   OT Discharge Recommendation Home with home health rehabilitation  (HOME OT and family support/assist)   OT - OK to Discharge   (when medically stable)   AM-PAC Daily Activity Inpatient   Lower Body Dressing 3   Bathing 3   Toileting 3   Upper Body Dressing 3   Grooming 3   Eating 4   Daily Activity Raw Score 19   Daily Activity Standardized Score (Calc for Raw Score >=11) 40 22   [facility] Applied Cognition Inpatient   Following a Speech/Presentation 3   Understanding Ordinary Conversation 4   Taking Medications 3   Remembering Where Things Are Placed or Put Away 3   Remembering List of 4-5 Errands 2   Taking Care of Complicated Tasks 2   Applied Cognition Raw Score 17   Applied Cognition Standardized Score 36 52     Occupational Therapy Goals to be met in 10-14 days:  1) Pt will improve activity tolerance to G for 30 min txment sessions to enhance ADLs  2) Pt will complete ADLs/self care w/ mod I   3) Pt will complete toileting w/ mod I w/ G hygiene/thoroughness using DME PRN  4) Pt will improve functional transfers on/off all surfaces using DME PRN w/ G balance/safety including toileting w/ mod I  5) Pt will improve fx'l mobility during I/ADl/leisure tasks using DME PRN w/ g balance/safety w/ mod I  6) Pt will engage in ongoing cognitive assessment w/ G participation to A w/ safe d/c planning/recommendations  7) Pt will demonstrate G carryover of pt/caregiver education and training as appropriate w/ mod I  w/ G tolerance  8) Pt will engage in depression screen/leisure interest checklist w/ G participation to monitor s/s depression and ID 3 positive coping strategies to A w/ emotional regulation and management  9) Pt will demonstrate 100% carryover of E C  techniques w/ mod I t/o fx'l I/ADL/leisure tasks w/o cues s/p skilled education  10) Pt will engage in activity configuration activity w/ G participation and mod I to increase time management skills and improve participation in a structured routine to improve overall quality of life  11) Pt will demonstrate improved standing tolerance to 3-5 minutes during functional tasks w/ Fair + dynamic standing balance to enhance ADL performance  12) Pt will demonstrate improved b/l UE strength by 1 MMT grade to enhance ADLS and functional transfers  13) Pt will demonstrate and recall self-monitoring techniques for CHF (such as daily weights on scale, reading scale, modified diets) at MOD I level s/p education and training to enhance health maintenance routines at home       Documentation completed by: Sindy Carvalho MS, OTR/L

## 2022-03-14 NOTE — PLAN OF CARE
Problem: Potential for Falls  Goal: Patient will remain free of falls  Description: INTERVENTIONS:  - Educate patient/family on patient safety including physical limitations  - Instruct patient to call for assistance with activity   - Consult OT/PT to assist with strengthening/mobility   - Keep Call bell within reach  - Keep bed low and locked with side rails adjusted as appropriate  - Keep care items and personal belongings within reach  - Initiate and maintain comfort rounds  - Make Fall Risk Sign visible to staff  - Offer Toileting every 2 Hours, in advance of need  - Initiate/Maintain bed alarm  - Obtain necessary fall risk management equipment:   - Apply yellow socks and bracelet for high fall risk patients  - Consider moving patient to room near nurses station  Outcome: Progressing     Problem: PAIN - ADULT  Goal: Verbalizes/displays adequate comfort level or baseline comfort level  Description: Interventions:  - Encourage patient to monitor pain and request assistance  - Assess pain using appropriate pain scale  - Administer analgesics based on type and severity of pain and evaluate response  - Implement non-pharmacological measures as appropriate and evaluate response  - Consider cultural and social influences on pain and pain management  - Notify physician/advanced practitioner if interventions unsuccessful or patient reports new pain  Outcome: Progressing     Problem: INFECTION - ADULT  Goal: Absence or prevention of progression during hospitalization  Description: INTERVENTIONS:  - Assess and monitor for signs and symptoms of infection  - Monitor lab/diagnostic results  - Monitor all insertion sites, i e  indwelling lines, tubes, and drains  - Monitor endotracheal if appropriate and nasal secretions for changes in amount and color  - [place] appropriate cooling/warming therapies per order  - Administer medications as ordered  - Instruct and encourage patient and family to use good hand hygiene technique  - Identify and instruct in appropriate isolation precautions for identified infection/condition  Outcome: Progressing     Problem: SAFETY ADULT  Goal: Patient will remain free of falls  Description: INTERVENTIONS:  - Educate patient/family on patient safety including physical limitations  - Instruct patient to call for assistance with activity   - Consult OT/PT to assist with strengthening/mobility   - Keep Call bell within reach  - Keep bed low and locked with side rails adjusted as appropriate  - Keep care items and personal belongings within reach  - Initiate and maintain comfort rounds  - Make Fall Risk Sign visible to staff  - Offer Toileting every 2 Hours, in advance of need  - Initiate/Maintain bed alarm  - Obtain necessary fall risk management equipment:   - Apply yellow socks and bracelet for high fall risk patients  - Consider moving patient to room near nurses station  Outcome: Progressing  Goal: Maintain or return to baseline ADL function  Description: INTERVENTIONS:  -  Assess patient's ability to carry out ADLs; assess patient's baseline for ADL function and identify physical deficits which impact ability to perform ADLs (bathing, care of mouth/teeth, toileting, grooming, dressing, etc )  - Assess/evaluate cause of self-care deficits   - Assess range of motion  - Assess patient's mobility; develop plan if impaired  - Assess patient's need for assistive devices and provide as appropriate  - Encourage maximum independence but intervene and supervise when necessary  - Involve family in performance of ADLs  - Assess for home care needs following discharge   - Consider OT consult to assist with ADL evaluation and planning for discharge  - Provide patient education as appropriate  Outcome: Progressing  Goal: Maintains/Returns to pre admission functional level  Description: INTERVENTIONS:  - Perform BMAT or MOVE assessment daily    - Set and communicate daily mobility goal to care team and patient/family/caregiver  - Collaborate with rehabilitation services on mobility goals if consulted  - Perform Range of Motion 3 times a day  - Reposition patient every 3 hours  - Dangle patient 3 times a day  - Stand patient 3 times a day  - Ambulate patient 3 times a day  - Out of bed to chair 3 times a day   - Out of bed for meals 3 times a day  - Out of bed for toileting  - Record patient progress and toleration of activity level   Outcome: Progressing     Problem: DISCHARGE PLANNING  Goal: Discharge to home or other facility with appropriate resources  Description: INTERVENTIONS:  - Identify barriers to discharge w/patient and caregiver  - Arrange for needed discharge resources and transportation as appropriate  - Identify discharge learning needs (meds, wound care, etc )  - Arrange for interpretive services to assist at discharge as needed  - Refer to Case Management Department for coordinating discharge planning if the patient needs post-hospital services based on physician/advanced practitioner order or complex needs related to functional status, cognitive ability, or social support system  Outcome: Progressing     Problem: Knowledge Deficit  Goal: Patient/family/caregiver demonstrates understanding of disease process, treatment plan, medications, and discharge instructions  Description: Complete learning assessment and assess knowledge base    Interventions:  - Provide teaching at level of understanding  - Provide teaching via preferred learning methods  Outcome: Progressing     Problem: MOBILITY - ADULT  Goal: Maintain or return to baseline ADL function  Description: INTERVENTIONS:  -  Assess patient's ability to carry out ADLs; assess patient's baseline for ADL function and identify physical deficits which impact ability to perform ADLs (bathing, care of mouth/teeth, toileting, grooming, dressing, etc )  - Assess/evaluate cause of self-care deficits   - Assess range of motion  - Assess patient's mobility; develop plan if impaired  - Assess patient's need for assistive devices and provide as appropriate  - Encourage maximum independence but intervene and supervise when necessary  - Involve family in performance of ADLs  - Assess for home care needs following discharge   - Consider OT consult to assist with ADL evaluation and planning for discharge  - Provide patient education as appropriate  Outcome: Progressing  Goal: Maintains/Returns to pre admission functional level  Description: INTERVENTIONS:  - Perform BMAT or MOVE assessment daily    - Set and communicate daily mobility goal to care team and patient/family/caregiver  - Collaborate with rehabilitation services on mobility goals if consulted  - Perform Range of Motion 3 times a day  - Reposition patient every 3 hours    - Dangle patient 3 times a day  - Stand patient 3 times a day  - Ambulate patient 3 times a day  - Out of bed to chair 3 times a day   - Out of bed for meals 3 times a day  - Out of bed for toileting  - Record patient progress and toleration of activity level   Outcome: Progressing

## 2022-03-14 NOTE — PLAN OF CARE
Problem: PHYSICAL THERAPY ADULT  Goal: Performs mobility at highest level of function for planned discharge setting  See evaluation for individualized goals  Description: Treatment/Interventions: Functional transfer training,LE strengthening/ROM,Elevations,Therapeutic exercise,Endurance training,Patient/family training,Equipment eval/education,Bed mobility,Gait training,Compensatory technique education,Continued evaluation,Spoke to nursing,Family  Equipment Recommended: Deandra Jiménez (pt has)       See flowsheet documentation for full assessment, interventions and recommendations  Outcome: Progressing  Note: Prognosis: Good  Problem List: Decreased strength,Decreased endurance,Impaired balance,Decreased mobility,Decreased cognition,Decreased safety awareness,Impaired judgement  Assessment: Pt seen for PT treatment session this date with interventions consisting of gait training w/ emphasis on improving pt's ability to ambulate level surfaces x 40 ftx2 with min A provided by therapist with RW, Therapeutic exercise consisting of: AROM 15 reps B LE in sitting position and therapeutic activity consisting of training: bed mobility, supine<>sit transfers, sit<>stand transfers and toilet transfer  Pt agreeable to PT treatment session upon arrival, pt found supine in bed w/ HOB elevated, in no apparent distress and responsive  In comparison to previous session, pt with improvements in distance ambulated  Post session: pt returned back to recliner, chair alarm engaged, all needs in reach and RN notified of session findings/recommendations  Continue to recommend home with home health rehabilitation and 24/7 assistance at time of d/c in order to maximize pt's functional independence and safety w/ mobility  Pt continues to be functioning below baseline level, and remains limited 2* factors listed above and including impaired balance, decreased functional mobility, decreased safety awareness and high risk for falls   PT will continue to see pt during current hospitalization in order to address the deficits listed above and provide interventions consistent w/ POC in effort to achieve STGs  Barriers to Discharge: Inaccessible home environment  Barriers to Discharge Comments: [address]     PT Discharge Recommendation: Home with home health rehabilitation (and 24/7 assist)          See flowsheet documentation for full assessment

## 2022-03-14 NOTE — ASSESSMENT & PLAN NOTE
· Patient also complains of intermittent lightheadedness with exertion  · CT head negative for any acute intracranial abnormality  · PT/OT consult appreciated

## 2022-03-14 NOTE — PROGRESS NOTES
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   Descending thoracic aortic aneurysm   Hypertension   Dyslipidemia   COPD   Chronic kidney disease stage IIIA with deterioration in renal function since admission  Plan:     Will hold diuretics today due to hypokalemia and deterioration in renal function   Begin furosemide 20 mg daily IV tomorrow   K Dur 40 mEq b i d  Today   K Dur 20 mEq b i d  Beginning tomorrow   Echocardiogram pending      Interval history:  Patient states that he feels good      PROBLEM LIST:    Patient Active Problem List    Diagnosis Date Noted    Lightheadedness 03/12/2022    Fever due to infection 08/08/2021    Clostridium difficile infection 08/07/2021    Proctitis 08/06/2021    Rectal bleeding 08/05/2021    Hyperlipidemia 08/05/2021    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) 09/24/2020    Altered mental status     Early satiety 06/12/2020    Dysphagia 06/12/2020    TIA (transient ischemic attack) 05/28/2020    Left leg swelling 03/22/2019    Acquired renal cyst of left kidney 01/21/2019    Preop exam for internal medicine 01/21/2019    Abdominal pain 01/19/2019    Incarcerated right inguinal hernia 01/19/2019    Acute on chronic diastolic congestive heart failure ([address] ) 01/19/2019    Leukocytosis 12/29/2018    Thrombocytopenia ([address] ) 12/29/2018    Hypochloremia 12/29/2018    Aneurysm, aorta, thoracic ([address] )     Thoracic aortic aneurysm without rupture ([address] ) 11/19/2018    Edema of both legs 08/02/2018    Snoring 08/02/2018    Varicose veins of both lower extremities with pain 05/23/2018    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 ) 02/01/2018    Descending aortic aneurysm ([facility] ) 02/01/2018    History of DVT (deep vein thrombosis) 02/01/2018    Benign essential hypertension 08/02/2017    Thoracic aortic aneurysm ([facility] ) 08/02/2017       Vitals: /60   Pulse 74   Temp 98 2 °F (36 8 °C) (Temporal)   Resp 18   Ht 5' 5" (1 651 m)   Wt 59 9 kg (132 lb)   SpO2 93%   BMI 21 97 kg/m²   PREVIOUS WEIGHTS:   Weight (last 2 days)     Date/Time Weight    03/14/22 0900 59 9 (132)    03/14/22 0600 60 (132 28)    03/13/22 0600 61 6 (135 8)    03/12/22 2003 62 8 (138 45)    03/12/22 1536 69 (152 12)          Wt Readings from Last 2 Encounters:   03/14/22 59 9 kg (132 lb)   11/09/21 67 kg (147 lb 12 8 oz)       Labs/Data:        Results from last 7 days   Lab Units 03/14/22  0634 03/13/22  0512 03/12/22  1555   WBC Thousand/uL 8 79 6 83 7 15   HEMOGLOBIN g/dL 13 7 12 3 12 3   HEMATOCRIT % 47 7 43 7 44 1   MCV fL 100* 102* 101*   MCH pg 28 8 28 8 28 1   MCHC g/dL 28 7* 28 1* 27 9*   PLATELETS Thousands/uL 155 145* 155     Results from last 7 days   Lab Units 03/14/22  0634 03/13/22  0512 03/12/22  1555   EGFR ml/min/1 73sq m 47 61 59   SODIUM mmol/L 145 145 144   POTASSIUM mmol/L 3 3* 4 3 4 1   CHLORIDE mmol/L 99* 103 105   CO2 mmol/L 43* 41* 40*   BUN mg/dL 25 22 23   CREATININE mg/dL 1 36* 1 09 1 13     Results from last 7 days   Lab Units 03/14/22  0634 03/13/22  0512 03/12/22  1555   CALCIUM mg/dL 8 4 8 5 8 9   AST U/L  --   --  48*   ALT U/L  --   --  40   ALK PHOS U/L  --   --  79   MAGNESIUM mg/dL  --   --  2 5         Lab Results   Component Value Date    NTBNP 4,004 (H) 03/12/2022    NTBNP 779 (H) 06/11/2020    NTBNP 597 (H) 05/28/2020     Lab Results   Component Value Date    CHOLESTEROL 127 03/14/2022    TRIG 84 03/14/2022    HDL 26 (L) 03/14/2022    LDLCALC 84 03/14/2022    HGBA1C 6 2 (H) 06/13/2020         Current Facility-Administered Medications:     acetaminophen (TYLENOL) tablet 650 mg, 650 mg, Oral, Q6H PRN, Zia Yousif PA-C, 650 mg at 03/13/22 2205    albuterol (PROVENTIL HFA,VENTOLIN HFA) inhaler 2 puff, 2 puff, Inhalation, Q4H PRN, Zia Yousif PA-C    amLODIPine (NORVASC) tablet 5 mg, 5 mg, Oral, Daily, Zia Yousif PA-C, 5 mg at 03/14/22 7728    aspirin (ECOTRIN LOW STRENGTH) EC tablet 81 mg, 81 mg, Oral, Daily, Zia Yousif PA-C, 81 mg at 03/14/22 3812    atorvastatin (LIPITOR) tablet 10 mg, 10 mg, Oral, Daily With Dinner, Zia Yousif PA-C, 10 mg at 03/13/22 1704    budesonide-formoterol (SYMBICORT) 160-4 5 mcg/act inhaler 2 puff, 2 puff, Inhalation, BID, Zia Yousif PA-C, 2 puff at 03/14/22 0824    calcium carbonate (TUMS) chewable tablet 1,000 mg, 1,000 mg, Oral, TID PRN, Zia Yousif PA-C    enoxaparin (LOVENOX) subcutaneous injection 40 mg, 40 mg, Subcutaneous, Daily, Zia Yousif PA-C, 40 mg at 03/14/22 5290    ferrous sulfate tablet 325 mg, 325 mg, Oral, Daily With Breakfast, Zia Yousif PA-C, 325 mg at 03/14/22 2154    metoprolol tartrate (LOPRESSOR) tablet 50 mg, 50 mg, Oral, Q12H [address], Zia Yousif PA-C, 50 mg at 03/14/22 0823    ondansetron (ZOFRAN) injection 4 mg, 4 mg, Intravenous, Q6H PRN, Zia Yousif PA-C    pantoprazole (PROTONIX) EC tablet 40 mg, 40 mg, Oral, Early Morning, Zia Yousif PA-C, 40 mg at 03/14/22 0549    Insert peripheral IV, , , Once **AND** sodium chloride (PF) 0 9 % injection 3 mL, 3 mL, Intravenous, Q1H PRN, Sadie Lover JEREMIE Taylor    umeclidinium bromide (INCRUSE ELLIPTA) 62 5 mcg/inh inhaler AEPB 1 puff, 1 puff, Inhalation, Daily, Zia Yousif PA-C, 1 puff at 03/14/22 0876  Allergies   Allergen Reactions    Penicillins Hives, Itching and Rash    Lisinopril Rash     Side pains and rash          Intake/Output Summary (Last 24 hours) at 3/14/2022 1004  Last data filed at 3/14/2022 0501  Gross per 24 hour   Intake 820 ml Output 1025 ml   Net -205 ml       Invasive Devices  Report    Peripheral Intravenous Line            Peripheral IV 03/12/22 Right Antecubital 1 day                Review of Systems   Constitutional: Negative for activity change  Respiratory: Negative for cough, chest tightness, shortness of breath and wheezing  Cardiovascular: Negative for chest pain, palpitations and leg swelling  Musculoskeletal: Negative for gait problem  Skin: Negative for color change  Neurological: Negative for dizziness, tremors, syncope, weakness, light-headedness and headaches  Psychiatric/Behavioral: Negative for agitation and confusion  Physical Exam  Vitals reviewed  Constitutional:       General: He is not in acute distress  Appearance: He is well-developed  HENT:      Head: Normocephalic and atraumatic  Neck:      Thyroid: No thyromegaly  Vascular: No carotid bruit or JVD  Trachea: No tracheal deviation  Cardiovascular:      Rate and Rhythm: Normal rate and regular rhythm  Pulses: Normal pulses  Heart sounds: Normal heart sounds  No murmur heard  No friction rub  No gallop  Pulmonary:      Effort: Pulmonary effort is normal  No respiratory distress  Breath sounds: Examination of the right-lower field reveals rales  Rales present  No wheezing or rhonchi  Chest:      Chest wall: No tenderness  Musculoskeletal:      Cervical back: Normal range of motion and neck supple  Right lower leg: No tenderness  No edema  Left lower leg: No tenderness  No edema  Skin:     General: Skin is warm and dry  Neurological:      General: No focal deficit present  Mental Status: He is alert and oriented to person, place, and time  Psychiatric:         Mood and Affect: Mood normal          Behavior: Behavior normal          Thought Content:  Thought content normal          Judgment: Judgment normal          ====================================================== Imaging:   I have personally reviewed pertinent reports  EKG:  Sinus rhythm with PACs  Portions of the record may have been created with voice recognition software  Occasional wrong word or "sound a like" substitutions may have occurred due to the inherent limitations of voice recognition software  Read the chart carefully and recognize, using context, where substitutions have occurred

## 2022-03-14 NOTE — PLAN OF CARE
Problem: Potential for Falls  Goal: Patient will remain free of falls  Description: INTERVENTIONS:  - Educate patient/family on patient safety including physical limitations  - Instruct patient to call for assistance with activity   - Consult OT/PT to assist with strengthening/mobility   - Keep Call bell within reach  - Keep bed low and locked with side rails adjusted as appropriate  - Keep care items and personal belongings within reach  - Initiate and maintain comfort rounds  - Make Fall Risk Sign visible to staff  - Offer Toileting every  Hours, in advance of need  - Initiate/Maintain alarm  - Obtain necessary fall risk management equipment:   - Apply yellow socks and bracelet for high fall risk patients  - Consider moving patient to room near nurses station  Outcome: Progressing     Problem: PAIN - ADULT  Goal: Verbalizes/displays adequate comfort level or baseline comfort level  Description: Interventions:  - Encourage patient to monitor pain and request assistance  - Assess pain using appropriate pain scale  - Administer analgesics based on type and severity of pain and evaluate response  - Implement non-pharmacological measures as appropriate and evaluate response  - Consider cultural and social influences on pain and pain management  - Notify physician/advanced practitioner if interventions unsuccessful or patient reports new pain  Outcome: Progressing     Problem: INFECTION - ADULT  Goal: Absence or prevention of progression during hospitalization  Description: INTERVENTIONS:  - Assess and monitor for signs and symptoms of infection  - Monitor lab/diagnostic results  - Monitor all insertion sites, i e  indwelling lines, tubes, and drains  - Monitor endotracheal if appropriate and nasal secretions for changes in amount and color  - [place] appropriate cooling/warming therapies per order  - Administer medications as ordered  - Instruct and encourage patient and family to use good hand hygiene technique  - Identify and instruct in appropriate isolation precautions for identified infection/condition  Outcome: Progressing     Problem: SAFETY ADULT  Goal: Patient will remain free of falls  Description: INTERVENTIONS:  - Educate patient/family on patient safety including physical limitations  - Instruct patient to call for assistance with activity   - Consult OT/PT to assist with strengthening/mobility   - Keep Call bell within reach  - Keep bed low and locked with side rails adjusted as appropriate  - Keep care items and personal belongings within reach  - Initiate and maintain comfort rounds  - Make Fall Risk Sign visible to staff  - Offer Toileting every  Hours, in advance of need  - Initiate/Maintain alarm  - Obtain necessary fall risk management equipment:   - Apply yellow socks and bracelet for high fall risk patients  - Consider moving patient to room near nurses station  Outcome: Progressing  Goal: Maintain or return to baseline ADL function  Description: INTERVENTIONS:  -  Assess patient's ability to carry out ADLs; assess patient's baseline for ADL function and identify physical deficits which impact ability to perform ADLs (bathing, care of mouth/teeth, toileting, grooming, dressing, etc )  - Assess/evaluate cause of self-care deficits   - Assess range of motion  - Assess patient's mobility; develop plan if impaired  - Assess patient's need for assistive devices and provide as appropriate  - Encourage maximum independence but intervene and supervise when necessary  - Involve family in performance of ADLs  - Assess for home care needs following discharge   - Consider OT consult to assist with ADL evaluation and planning for discharge  - Provide patient education as appropriate  Outcome: Progressing  Goal: Maintains/Returns to pre admission functional level  Description: INTERVENTIONS:  - Perform BMAT or MOVE assessment daily    - Set and communicate daily mobility goal to care team and patient/family/caregiver  - Collaborate with rehabilitation services on mobility goals if consulted  - Perform Range of Motion  times a day  - Reposition patient every  hours  - Dangle patient  times a day  - Stand patient  times a day  - Ambulate patient  times a day  - Out of bed to chair  times a day   - Out of bed for meals  times a day  - Out of bed for toileting  - Record patient progress and toleration of activity level   Outcome: Progressing     Problem: DISCHARGE PLANNING  Goal: Discharge to home or other facility with appropriate resources  Description: INTERVENTIONS:  - Identify barriers to discharge w/patient and caregiver  - Arrange for needed discharge resources and transportation as appropriate  - Identify discharge learning needs (meds, wound care, etc )  - Arrange for interpretive services to assist at discharge as needed  - Refer to Case Management Department for coordinating discharge planning if the patient needs post-hospital services based on physician/advanced practitioner order or complex needs related to functional status, cognitive ability, or social support system  Outcome: Progressing     Problem: Knowledge Deficit  Goal: Patient/family/caregiver demonstrates understanding of disease process, treatment plan, medications, and discharge instructions  Description: Complete learning assessment and assess knowledge base    Interventions:  - Provide teaching at level of understanding  - Provide teaching via preferred learning methods  Outcome: Progressing     Problem: MOBILITY - ADULT  Goal: Maintain or return to baseline ADL function  Description: INTERVENTIONS:  -  Assess patient's ability to carry out ADLs; assess patient's baseline for ADL function and identify physical deficits which impact ability to perform ADLs (bathing, care of mouth/teeth, toileting, grooming, dressing, etc )  - Assess/evaluate cause of self-care deficits   - Assess range of motion  - Assess patient's mobility; develop plan if impaired  - Assess patient's need for assistive devices and provide as appropriate  - Encourage maximum independence but intervene and supervise when necessary  - Involve family in performance of ADLs  - Assess for home care needs following discharge   - Consider OT consult to assist with ADL evaluation and planning for discharge  - Provide patient education as appropriate  Outcome: Progressing  Goal: Maintains/Returns to pre admission functional level  Description: INTERVENTIONS:  - Perform BMAT or MOVE assessment daily    - Set and communicate daily mobility goal to care team and patient/family/caregiver  - Collaborate with rehabilitation services on mobility goals if consulted  - Perform Range of Motion  times a day  - Reposition patient every  hours    - Dangle patient  times a day  - Stand patient  times a day  - Ambulate patient  times a day  - Out of bed to chair times a day   - Out of bed for meals times a day  - Out of bed for toileting  - Record patient progress and toleration of activity level   Outcome: Progressing

## 2022-03-14 NOTE — PHYSICAL THERAPY NOTE
PHYSICAL THERAPY TREATMENT NOTE  NAME:  Shola Puente  DATE: [birth year]    Length Of Stay: 2  Performed at least 2 patient identifiers during session: Name and ID bracelet    TREATMENT NOTE:     03/14/22 1029   PT Last Visit   PT Visit Date 03/14/22   Note Type   Note Type Treatment   Pain Assessment   Pain Assessment Tool 0-10   Pain Score No Pain   Restrictions/Precautions   Weight Bearing Precautions Per Order No   Other Precautions Bed Alarm; Chair Alarm;Multiple lines;Telemetry;O2;Fall Risk  (5 L O2, Polish speaking only)   General   Chart Reviewed Yes   Response to Previous Treatment Patient unable to report, no changes reported from family or staff   Family/Caregiver Present Yes  (spouse)   Cognition   Overall Cognitive Status WFL   Arousal/Participation Alert; Cooperative   Attention Within functional limits   Orientation Level Oriented to person;Oriented to place   Memory Decreased short term memory;Decreased recall of recent events;Decreased recall of precautions   Following Commands Follows one step commands with increased time or repetition   Comments wife present to translate some, pt son on the phone   Bed Mobility   Supine to Sit 5  Supervision   Additional items HOB elevated; Bedrails; Increased time required;Verbal cues   Additional Comments pt OOB in chair to end session   Transfers   Sit to Stand 4  Minimal assistance   Additional items Assist x 1; Increased time required;Verbal cues;Armrests; Bedrails   Stand to Sit 4  Minimal assistance   Additional items Assist x 1; Increased time required;Verbal cues;Armrests   Stand pivot 4  Minimal assistance   Additional items Assist x 1; Increased time required;Verbal cues   Toilet transfer 4  Minimal assistance   Additional items Assist x 1; Increased time required;Verbal cues;Standard toilet  (grab bar)   Additional Comments cues for proper hand placement  and controlled ddescent   Ambulation/Elevation   Gait pattern Improper Weight shift;Decreased foot clearance; Inconsistent rafia; Foward flexed; Short stride   Gait Assistance 4  Minimal assist   Additional items Assist x 1;Verbal cues; Tactile cues   Assistive Device Rolling walker   Distance 40 ftx2   Balance   Static Sitting Good   Dynamic Sitting Fair +   Static Standing Fair   Dynamic Standing Fair -   Ambulatory Poor +   Endurance Deficit   Endurance Deficit Yes   Activity Tolerance   Activity Tolerance Patient limited by fatigue;Treatment limited secondary to medical complications (Comment)   Nurse Made Aware yes   Exercises   Heelslides Sitting;15 reps;AROM; Bilateral   Hip Flexion Sitting;15 reps;AROM; Bilateral   Hip Abduction Sitting;15 reps;AROM; Bilateral   Hip Adduction Sitting;15 reps;AROM; Bilateral   Knee AROM Long Arc Quad Sitting;15 reps;AROM; Bilateral   Ankle Pumps Sitting;15 reps;AROM; Bilateral   Marching Sitting;15 reps;AROM; Bilateral   Assessment   Prognosis Good   Problem List Decreased strength;Decreased endurance; Impaired balance;Decreased mobility; Decreased cognition;Decreased safety awareness; Impaired judgement   Assessment Pt seen for PT treatment session this date with interventions consisting of gait training w/ emphasis on improving pt's ability to ambulate level surfaces x 40 ftx2 with min A provided by therapist with RW, Therapeutic exercise consisting of: AROM 15 reps B LE in sitting position and therapeutic activity consisting of training: bed mobility, supine<>sit transfers, sit<>stand transfers and toilet transfer  Pt agreeable to PT treatment session upon arrival, pt found supine in bed w/ HOB elevated, in no apparent distress and responsive  In comparison to previous session, pt with improvements in distance ambulated  Post session: pt returned back to recliner, chair alarm engaged, all needs in reach and RN notified of session findings/recommendations   Continue to recommend home with home health rehabilitation and 24/7 assistance at time of d/c in order to maximize pt's functional independence and safety w/ mobility  Pt continues to be functioning below baseline level, and remains limited 2* factors listed above and including impaired balance, decreased functional mobility, decreased safety awareness and high risk for falls  PT will continue to see pt during current hospitalization in order to address the deficits listed above and provide interventions consistent w/ POC in effort to achieve STGs  Barriers to Discharge Inaccessible home environment   Plan   Treatment/Interventions Functional transfer training;LE strengthening/ROM; Therapeutic exercise;Elevations; Endurance training;Bed mobility;Gait training;Cognitive reorientation;Spoke to nursing;Family   Progress Progressing toward goals   PT Frequency 3-5x/wk   Recommendation   PT Discharge Recommendation Home with home health rehabilitation  (and 24/7 assist)   Equipment Recommended Walker   Additional Comments upon conclusion, pt OOB in chair with all needs in reach   [facility] 8 in Bed Without Bedrails 3   Lying on Back to Sitting on Edge of Flat Bed 3   Moving Bed to Chair 3   Standing Up From Chair 3   Walk in Room 3   Climb 3-5 Stairs 2   Basic Mobility Inpatient Raw Score 17   Basic Mobility Standardized Score 39 67   Highest Level Of Mobility   [facility] Goal 5: Stand one or more mins   Education   Education Provided Mobility training   Patient Demonstrates verbal understanding;Reinforcement needed   End of Consult   Patient Position at End of Consult Bedside chair;Bed/Chair alarm activated; All needs within reach       The patient's AM-PAC Basic Mobility Inpatient Short Form Raw Score is 17  A Raw score of greater than 16 suggests the patient may benefit from discharge to home  Please also refer to the recommendation of the Physical Therapist for safe discharge planning        Melanie Goodell, PTA,PTA

## 2022-03-15 LAB
ANION GAP SERPL CALCULATED.3IONS-SCNC: 2 MMOL/L (ref 4–13)
BUN SERPL-MCNC: 31 MG/DL (ref 5–25)
CALCIUM SERPL-MCNC: 9.2 MG/DL (ref 8.3–10.1)
CHLORIDE SERPL-SCNC: 98 MMOL/L (ref 100–108)
CO2 SERPL-SCNC: 43 MMOL/L (ref 21–32)
CREAT SERPL-MCNC: 1.38 MG/DL (ref 0.6–1.3)
GFR SERPL CREATININE-BSD FRML MDRD: 46 ML/MIN/1.73SQ M
GLUCOSE SERPL-MCNC: 111 MG/DL (ref 65–140)
GLUCOSE SERPL-MCNC: 116 MG/DL (ref 65–140)
POTASSIUM SERPL-SCNC: 5 MMOL/L (ref 3.5–5.3)
PROCALCITONIN SERPL-MCNC: 0.15 NG/ML
SODIUM SERPL-SCNC: 143 MMOL/L (ref 136–145)

## 2022-03-15 PROCEDURE — 82948 REAGENT STRIP/BLOOD GLUCOSE: CPT

## 2022-03-15 PROCEDURE — 99232 SBSQ HOSP IP/OBS MODERATE 35: CPT | Performed by: INTERNAL MEDICINE

## 2022-03-15 PROCEDURE — 84145 PROCALCITONIN (PCT): CPT | Performed by: PHYSICIAN ASSISTANT

## 2022-03-15 PROCEDURE — 80048 BASIC METABOLIC PNL TOTAL CA: CPT | Performed by: INTERNAL MEDICINE

## 2022-03-15 RX ADMIN — BUDESONIDE AND FORMOTEROL FUMARATE DIHYDRATE 2 PUFF: 160; 4.5 AEROSOL RESPIRATORY (INHALATION) at 08:37

## 2022-03-15 RX ADMIN — METOPROLOL TARTRATE 50 MG: 50 TABLET, FILM COATED ORAL at 08:37

## 2022-03-15 RX ADMIN — FERROUS SULFATE TAB 325 MG (65 MG ELEMENTAL FE) 325 MG: 325 (65 FE) TAB at 08:37

## 2022-03-15 RX ADMIN — ASPIRIN 81 MG: 81 TABLET, COATED ORAL at 08:37

## 2022-03-15 RX ADMIN — BUDESONIDE AND FORMOTEROL FUMARATE DIHYDRATE 2 PUFF: 160; 4.5 AEROSOL RESPIRATORY (INHALATION) at 17:08

## 2022-03-15 RX ADMIN — ENOXAPARIN SODIUM 40 MG: 40 INJECTION SUBCUTANEOUS at 08:37

## 2022-03-15 RX ADMIN — ATORVASTATIN CALCIUM 10 MG: 10 TABLET, FILM COATED ORAL at 15:30

## 2022-03-15 RX ADMIN — AMLODIPINE BESYLATE 5 MG: 5 TABLET ORAL at 08:37

## 2022-03-15 RX ADMIN — PANTOPRAZOLE SODIUM 40 MG: 40 TABLET, DELAYED RELEASE ORAL at 05:47

## 2022-03-15 RX ADMIN — POTASSIUM CHLORIDE 20 MEQ: 1500 TABLET, EXTENDED RELEASE ORAL at 08:37

## 2022-03-15 RX ADMIN — UMECLIDINIUM 1 PUFF: 62.5 AEROSOL, POWDER ORAL at 08:37

## 2022-03-15 RX ADMIN — GUAIFENESIN 600 MG: 600 TABLET, EXTENDED RELEASE ORAL at 21:41

## 2022-03-15 RX ADMIN — GUAIFENESIN 600 MG: 600 TABLET, EXTENDED RELEASE ORAL at 08:37

## 2022-03-15 NOTE — CASE MANAGEMENT
Case Management Assessment & Discharge Planning Note    Patient name Shahid Zhong  Location [place] [address] [address] MRN [id number]  : 1935 Date 3/15/2022       Current Admission Date: 3/12/2022  Current Admission Diagnosis:Acute on chronic diastolic congestive heart failure [facility])   Patient Active Problem List    Diagnosis Date Noted    Lightheadedness 2022    Fever due to infection 2021    Clostridium difficile infection 2021    Proctitis 2021    Rectal bleeding 2021    Hyperlipidemia 2021    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    Altered mental status     Early satiety 2020    Dysphagia 2020    TIA (transient ischemic attack) 2020    Left leg swelling 2019    Acquired renal cyst of left kidney 2019    Preop exam for internal medicine 2019    Abdominal pain 2019    Incarcerated right inguinal hernia 2019    Acute on chronic diastolic congestive heart failure ([address] ) 2019    Leukocytosis 2018    Thrombocytopenia ([address] ) 2018    Hypochloremia 2018    Aneurysm, aorta, thoracic ([facility] )     Thoracic aortic aneurysm without rupture ([facility] ) 2018    Edema of both legs 2018    Snoring 2018    Varicose veins of both lower extremities with pain 2018    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HCC) 2018    Chronic respiratory failure with hypoxia ([address] ) 2018    Accelerated essential hypertension 2018    COPD (chronic obstructive pulmonary disease) ([address] ) 2018    Descending aortic aneurysm ([facility] ) 2018    History of DVT (deep vein thrombosis) 2018    Benign essential hypertension 2017    Thoracic aortic aneurysm ([address] ) 2017      LOS (days): 3  Geometric Mean LOS (GMLOS) (days): 3 80  Days to GMLOS:0 9     OBJECTIVE:    Risk of Unplanned Readmission Score: 16         Current admission status: Inpatient       Preferred Pharmacy:   [address] , [facility]  [address] [facility]  [address] [facility] [address]  Phone: [phone] Fax: [phone]    [facility] [address] [address]  [address] [facility] [address], East  [address] [address]  Phone: [phone] Fax: [phone]    Primary Care Provider: Jaimie Bay MD    Primary Insurance: Severo Ramp MEDICARE [facility]  Secondary Insurance: [facility]    ASSESSMENT:  Active Health Care Proxies    There are no active Health Care Proxies on file  Advance Directives  Does patient have a [address] [facility] [address]?: No  Was patient offered paperwork?: Yes (spouse declined)  Does patient currently have a Health Care decision maker?: No  Does patient have Advance Directives?: No  Was patient offered paperwork?: Yes (spouse declined)         Readmission Root Cause  30 Day Readmission: No    Patient Information  Admitted from[de-identified] Home  Mental Status: Alert  During Assessment patient was accompanied by: Spouse  Assessment information provided by[de-identified] Spouse  Primary Caregiver: Self  Support Systems: Spouse/significant [address] [facility] of Residence: [address] [place] [address] do you live in?: [address] [place] [address] entry access options   Select all that apply : Stairs  Number of steps to enter home : One Flight  Do the steps have railings?: Yes  Type of Current Residence: [address] [place] home  Upon entering residence, is there a bedroom on the main floor (no further steps)?: Yes  Upon entering residence, is there a bathroom on the main floor (no further steps)?: Yes  In the last 12 months, was there a time when you were not able to pay the mortgage or rent on time?: No  In the last 12 months, was there a time when you did not have a steady place to sleep or slept in a shelter (including now)?: No  Homeless/housing insecurity resource given?: N/A  Living Arrangements: Lives w/ Spouse/significant other  Is patient a ?: No    Activities of Daily Living Prior to Admission  Functional Status: Independent  Completes ADLs independently?: Yes  Ambulates independently?: Yes  Does patient use assisted devices?: Yes  Assisted Devices (DME) used: Straight Cane,Walker  Does patient currently own DME?: Yes  What DME does the patient currently own?: Straight Cane,Walker  Does patient have a history of Outpatient Therapy (PT/OT)?: No  Does the patient have a history of Short-Term Rehab?: No  Does patient have a history of HHC?: No  Does patient currently have [facility]?: No    Current Home Health Care  Type of Current Home Care Services: Home health aide    Patient Information Continued  Income Source: Pension/alf  Does patient have prescription coverage?: Yes  Within the past 12 months, you worried that your food would run out before you got the money to buy more : Never true  Within the past 12 months, the food you bought just didnt last and you didnt have money to get more : Never true  Food insecurity resource given?: N/A  Does patient receive dialysis treatments?: No  Does patient have a history of substance abuse?: No  Does patient have a history of Mental Health Diagnosis?: No         Means of Transportation  Means of Transport to Appts[de-identified] Family transport  In the past 12 months, has lack of transportation kept you from medical appointments or from getting medications?: No  In the past 12 months, has lack of transportation kept you from meetings, work, or from getting things needed for daily living?: No  Was application for public transport provided?: N/A        DISCHARGE DETAILS:    Discharge planning discussed with[de-identified] patient/spouse  Freedom of Choice: Yes  Comments - Freedom of Choice: PT rec's HHPT, patient with CHF  Agreeable to [facility] for PT/OT/RN and report any VNA is acceptable   Ref's sent  CM contacted family/caregiver?: Yes  Were Treatment Team discharge recommendations reviewed with patient/caregiver?: Yes  Did patient/caregiver verbalize understanding of patient care needs?: Yes  Were patient/caregiver advised of the risks associated with not following Treatment Team discharge recommendations?: Yes    Contacts  Patient Contacts: Jeb Sierra  Relationship to Patient[de-identified] Family  Contact Method: Phone  Phone Number: [phone]  Reason/Outcome: Continuity of [address] [facility] Bishop Rubio         Is the patient interested in Salvatore Curran at discharge?: Yes  [address] requested[de-identified] [address] Name[de-identified] Other  [facility] Provider[de-identified] PCP  Home Health Services Needed[de-identified] Evaluate Functional Status and Safety,Gait/ADL Training,Heart Failure Management,Strengthening/Theraputic Exercises to Improve Function  Oxygen LPM Ordered (if applicable based on home health services needed):: 2 LPM (chronic)  Homebound Criteria Met[de-identified] Requires the Assistance of Another Person for Safe Ambulation or to Leave the Home,Uses an Assist Device (i e  cane, walker, etc)  Supporting Clincal Findings[de-identified] Dyspnea with Exertion,Fatigues Easliy in Short Distances,Limited Endurance,Requires Oxygen    DME Referral Provided  Referral made for DME?: No    Other Referral/Resources/Interventions Provided:  Interventions: HHC  Programs[de-identified] CHF         Treatment Team Recommendation: Home with [facility]  Discharge Destination Plan[de-identified] Home with [address] [facility]

## 2022-03-15 NOTE — PLAN OF CARE
Problem: Potential for Falls  Goal: Patient will remain free of falls  Description: INTERVENTIONS:  - Educate patient/family on patient safety including physical limitations  - Instruct patient to call for assistance with activity   - Consult OT/PT to assist with strengthening/mobility   - Keep Call bell within reach  - Keep bed low and locked with side rails adjusted as appropriate  - Keep care items and personal belongings within reach  - Initiate and maintain comfort rounds  - Make Fall Risk Sign visible to staff  - Offer Toileting every  Hours, in advance of need  - Initiate/Maintain alarm  - Obtain necessary fall risk management equipment:   - Apply yellow socks and bracelet for high fall risk patients  - Consider moving patient to room near nurses station  Outcome: Progressing     Problem: PAIN - ADULT  Goal: Verbalizes/displays adequate comfort level or baseline comfort level  Description: Interventions:  - Encourage patient to monitor pain and request assistance  - Assess pain using appropriate pain scale  - Administer analgesics based on type and severity of pain and evaluate response  - Implement non-pharmacological measures as appropriate and evaluate response  - Consider cultural and social influences on pain and pain management  - Notify physician/advanced practitioner if interventions unsuccessful or patient reports new pain  Outcome: Progressing     Problem: INFECTION - ADULT  Goal: Absence or prevention of progression during hospitalization  Description: INTERVENTIONS:  - Assess and monitor for signs and symptoms of infection  - Monitor lab/diagnostic results  - Monitor all insertion sites, i e  indwelling lines, tubes, and drains  - Monitor endotracheal if appropriate and nasal secretions for changes in amount and color  - [place] appropriate cooling/warming therapies per order  - Administer medications as ordered  - Instruct and encourage patient and family to use good hand hygiene technique  - Identify and instruct in appropriate isolation precautions for identified infection/condition  Outcome: Progressing     Problem: SAFETY ADULT  Goal: Patient will remain free of falls  Description: INTERVENTIONS:  - Educate patient/family on patient safety including physical limitations  - Instruct patient to call for assistance with activity   - Consult OT/PT to assist with strengthening/mobility   - Keep Call bell within reach  - Keep bed low and locked with side rails adjusted as appropriate  - Keep care items and personal belongings within reach  - Initiate and maintain comfort rounds  - Make Fall Risk Sign visible to staff  - Offer Toileting every  Hours, in advance of need  - Initiate/Maintain alarm  - Obtain necessary fall risk management equipment:   - Apply yellow socks and bracelet for high fall risk patients  - Consider moving patient to room near nurses station  Outcome: Progressing  Goal: Maintain or return to baseline ADL function  Description: INTERVENTIONS:  -  Assess patient's ability to carry out ADLs; assess patient's baseline for ADL function and identify physical deficits which impact ability to perform ADLs (bathing, care of mouth/teeth, toileting, grooming, dressing, etc )  - Assess/evaluate cause of self-care deficits   - Assess range of motion  - Assess patient's mobility; develop plan if impaired  - Assess patient's need for assistive devices and provide as appropriate  - Encourage maximum independence but intervene and supervise when necessary  - Involve family in performance of ADLs  - Assess for home care needs following discharge   - Consider OT consult to assist with ADL evaluation and planning for discharge  - Provide patient education as appropriate  Outcome: Progressing  Goal: Maintains/Returns to pre admission functional level  Description: INTERVENTIONS:  - Perform BMAT or MOVE assessment daily    - Set and communicate daily mobility goal to care team and patient/family/caregiver  - Collaborate with rehabilitation services on mobility goals if consulted  - Perform Range of Motion  times a day  - Reposition patient every  hours  - Dangle patient  times a day  - Stand patient  times a day  - Ambulate patient  times a day  - Out of bed to chair  times a day   - Out of bed for meals  times a day  - Out of bed for toileting  - Record patient progress and toleration of activity level   Outcome: Progressing     Problem: DISCHARGE PLANNING  Goal: Discharge to home or other facility with appropriate resources  Description: INTERVENTIONS:  - Identify barriers to discharge w/patient and caregiver  - Arrange for needed discharge resources and transportation as appropriate  - Identify discharge learning needs (meds, wound care, etc )  - Arrange for interpretive services to assist at discharge as needed  - Refer to Case Management Department for coordinating discharge planning if the patient needs post-hospital services based on physician/advanced practitioner order or complex needs related to functional status, cognitive ability, or social support system  Outcome: Progressing     Problem: Knowledge Deficit  Goal: Patient/family/caregiver demonstrates understanding of disease process, treatment plan, medications, and discharge instructions  Description: Complete learning assessment and assess knowledge base    Interventions:  - Provide teaching at level of understanding  - Provide teaching via preferred learning methods  Outcome: Progressing     Problem: MOBILITY - ADULT  Goal: Maintain or return to baseline ADL function  Description: INTERVENTIONS:  -  Assess patient's ability to carry out ADLs; assess patient's baseline for ADL function and identify physical deficits which impact ability to perform ADLs (bathing, care of mouth/teeth, toileting, grooming, dressing, etc )  - Assess/evaluate cause of self-care deficits   - Assess range of motion  - Assess patient's mobility; develop plan if impaired  - Assess patient's need for assistive devices and provide as appropriate  - Encourage maximum independence but intervene and supervise when necessary  - Involve family in performance of ADLs  - Assess for home care needs following discharge   - Consider OT consult to assist with ADL evaluation and planning for discharge  - Provide patient education as appropriate  Outcome: Progressing  Goal: Maintains/Returns to pre admission functional level  Description: INTERVENTIONS:  - Perform BMAT or MOVE assessment daily    - Set and communicate daily mobility goal to care team and patient/family/caregiver  - Collaborate with rehabilitation services on mobility goals if consulted  - Perform Range of Motion  times a day  - Reposition patient every  hours    - Dangle patient  times a day  - Stand patient  times a day  - Ambulate patient  times a day  - Out of bed to chair  times a day   - Out of bed for meals times a day  - Out of bed for toileting  - Record patient progress and toleration of activity level   Outcome: Progressing

## 2022-03-15 NOTE — PLAN OF CARE
Problem: Potential for Falls  Goal: Patient will remain free of falls  Description: INTERVENTIONS:  - Educate patient/family on patient safety including physical limitations  - Instruct patient to call for assistance with activity   - Consult OT/PT to assist with strengthening/mobility   - Keep Call bell within reach  - Keep bed low and locked with side rails adjusted as appropriate  - Keep care items and personal belongings within reach  - Initiate and maintain comfort rounds  - Make Fall Risk Sign visible to staff  - Offer Toileting every 2 Hours, in advance of need  - Initiate/Maintain bed alarm  - Obtain necessary fall risk management equipment: bed alarm  - Apply yellow socks and bracelet for high fall risk patients  - Consider moving patient to room near nurses station  Outcome: Progressing     Problem: PAIN - ADULT  Goal: Verbalizes/displays adequate comfort level or baseline comfort level  Description: Interventions:  - Encourage patient to monitor pain and request assistance  - Assess pain using appropriate pain scale  - Administer analgesics based on type and severity of pain and evaluate response  - Implement non-pharmacological measures as appropriate and evaluate response  - Consider cultural and social influences on pain and pain management  - Notify physician/advanced practitioner if interventions unsuccessful or patient reports new pain  Outcome: Progressing     Problem: INFECTION - ADULT  Goal: Absence or prevention of progression during hospitalization  Description: INTERVENTIONS:  - Assess and monitor for signs and symptoms of infection  - Monitor lab/diagnostic results  - Monitor all insertion sites, i e  indwelling lines, tubes, and drains  - Monitor endotracheal if appropriate and nasal secretions for changes in amount and color  - [place] appropriate cooling/warming therapies per order  - Administer medications as ordered  - Instruct and encourage patient and family to use good hand hygiene technique  - Identify and instruct in appropriate isolation precautions for identified infection/condition  Outcome: Progressing     Problem: SAFETY ADULT  Goal: Patient will remain free of falls  Description: INTERVENTIONS:  - Educate patient/family on patient safety including physical limitations  - Instruct patient to call for assistance with activity   - Consult OT/PT to assist with strengthening/mobility   - Keep Call bell within reach  - Keep bed low and locked with side rails adjusted as appropriate  - Keep care items and personal belongings within reach  - Initiate and maintain comfort rounds  - Make Fall Risk Sign visible to staff  - Offer Toileting every 2 Hours, in advance of need  - Initiate/Maintain bed alarm  - Obtain necessary fall risk management equipment: bed alarm  - Apply yellow socks and bracelet for high fall risk patients  - Consider moving patient to room near nurses station  Outcome: Progressing  Goal: Maintain or return to baseline ADL function  Description: INTERVENTIONS:  - Educate patient/family on patient safety including physical limitations  - Instruct patient to call for assistance with activity   - Consult OT/PT to assist with strengthening/mobility   - Keep Call bell within reach  - Keep bed low and locked with side rails adjusted as appropriate  - Keep care items and personal belongings within reach  - Initiate and maintain comfort rounds  - Make Fall Risk Sign visible to staff  - Offer Toileting every 2 Hours, in advance of need  - Initiate/Maintain bed alarm  - Obtain necessary fall risk management equipment: bed alarm  - Apply yellow socks and bracelet for high fall risk patients  - Consider moving patient to room near nurses station  Outcome: Progressing  Goal: Maintains/Returns to pre admission functional level  Description: INTERVENTIONS:  - Perform BMAT or MOVE assessment daily    - Set and communicate daily mobility goal to care team and patient/family/caregiver  - Collaborate with rehabilitation services on mobility goals if consulted  - Perform Range of Motion 3 times a day  - Reposition patient every 2 hours  - Dangle patient 3 times a day  - Stand patient 3 times a day  - Ambulate patient 3 times a day  - Out of bed to chair 3 times a day   - Out of bed for meals 3 times a day  - Out of bed for toileting  - Record patient progress and toleration of activity level   Outcome: Progressing     Problem: DISCHARGE PLANNING  Goal: Discharge to home or other facility with appropriate resources  Description: INTERVENTIONS:  - Identify barriers to discharge w/patient and caregiver  - Arrange for needed discharge resources and transportation as appropriate  - Identify discharge learning needs (meds, wound care, etc )  - Arrange for interpretive services to assist at discharge as needed  - Refer to Case Management Department for coordinating discharge planning if the patient needs post-hospital services based on physician/advanced practitioner order or complex needs related to functional status, cognitive ability, or social support system  Outcome: Progressing     Problem: Knowledge Deficit  Goal: Patient/family/caregiver demonstrates understanding of disease process, treatment plan, medications, and discharge instructions  Description: Complete learning assessment and assess knowledge base    Interventions:  - Provide teaching at level of understanding  - Provide teaching via preferred learning methods  Outcome: Progressing     Problem: MOBILITY - ADULT  Goal: Maintain or return to baseline ADL function  Description: INTERVENTIONS:  - Educate patient/family on patient safety including physical limitations  - Instruct patient to call for assistance with activity   - Consult OT/PT to assist with strengthening/mobility   - Keep Call bell within reach  - Keep bed low and locked with side rails adjusted as appropriate  - Keep care items and personal belongings within reach  - Initiate and maintain comfort rounds  - Make Fall Risk Sign visible to staff  - Offer Toileting every 2 Hours, in advance of need  - Initiate/Maintain bed alarm  - Obtain necessary fall risk management equipment: bed alarm  - Apply yellow socks and bracelet for high fall risk patients  - Consider moving patient to room near nurses station  Outcome: Progressing  Goal: Maintains/Returns to pre admission functional level  Description: INTERVENTIONS:  - Perform BMAT or MOVE assessment daily    - Set and communicate daily mobility goal to care team and patient/family/caregiver  - Collaborate with rehabilitation services on mobility goals if consulted  - Perform Range of Motion 3 times a day  - Reposition patient every 2 hours    - Dangle patient 3 times a day  - Stand patient 3 times a day  - Ambulate patient 3 times a day  - Out of bed to chair 3 times a day   - Out of bed for meals 3 times a day  - Out of bed for toileting  - Record patient progress and toleration of activity level   Outcome: Progressing     Problem: Prexisting or High Potential for Compromised Skin Integrity  Goal: Skin integrity is maintained or improved  Description: INTERVENTIONS:  - Identify patients at risk for skin breakdown  - Assess and monitor skin integrity  - Assess and monitor nutrition and hydration status  - Monitor labs   - Assess for incontinence   - Turn and reposition patient  - Assist with mobility/ambulation  - Relieve pressure over bony prominences  - Avoid friction and shearing  - Provide appropriate hygiene as needed including keeping skin clean and dry  - Evaluate need for skin moisturizer/barrier cream  - Collaborate with interdisciplinary team   - Patient/family teaching  - Consider wound care consult   Outcome: Progressing     Problem: CARDIOVASCULAR - ADULT  Goal: Maintains optimal cardiac output and hemodynamic stability  Description: INTERVENTIONS:  - Monitor I/O, vital signs and rhythm  - Monitor for S/S and trends of decreased cardiac output  - Administer and titrate ordered vasoactive medications to optimize hemodynamic stability  - Assess quality of pulses, skin color and temperature  - Assess for signs of decreased coronary artery perfusion  - Instruct patient to report change in severity of symptoms  Outcome: Progressing  Goal: Absence of cardiac dysrhythmias or at baseline rhythm  Description: INTERVENTIONS:  - Continuous cardiac monitoring, vital signs, obtain 12 lead EKG if ordered  - Administer antiarrhythmic and heart rate control medications as ordered  - Monitor electrolytes and administer replacement therapy as ordered  Outcome: Progressing     Problem: RESPIRATORY - ADULT  Goal: Achieves optimal ventilation and oxygenation  Description: INTERVENTIONS:  - Assess for changes in respiratory status  - Assess for changes in mentation and behavior  - Position to facilitate oxygenation and minimize respiratory effort  - Oxygen administered by appropriate delivery if ordered  - Initiate smoking cessation education as indicated  - Encourage broncho-pulmonary hygiene including cough, deep breathe, Incentive Spirometry  - Assess the need for suctioning and aspirate as needed  - Assess and instruct to report SOB or any respiratory difficulty  - Respiratory Therapy support as indicated  Outcome: Progressing

## 2022-03-15 NOTE — PROGRESS NOTES
[facility]  Progress Note Tatiana Colbert [birth year], 82 y o  male MRN: [id number]  Unit/Bed#: E4 -01 Encounter: [id number]  Primary Care Provider: Jaime Brown MD   Date and time admitted to hospital: 3/12/2022  3:31 PM    * Acute on chronic diastolic congestive heart failure ([address] )  Assessment & Plan  Wt Readings from Last 3 Encounters:   03/15/22 60 7 kg (133 lb 12 8 oz)   11/09/21 67 kg (147 lb 12 8 oz)   08/11/21 64 kg (141 lb 1 5 oz)     · This is an 82-year-old male with history of COPD on chronic oxygen, CHF, hypertension presenting with dyspnea   · BNP 4000  · CXR with mild vascular congestion  · Echocardiogram with preserved ejection fraction  · Cardiology consultation appreciated  · Hold diuretics today given elevated creatinine  · Monitor I&O, daily weight, fluid restriction      Lightheadedness  Assessment & Plan  · Patient also complains of intermittent lightheadedness with exertion  · CT head negative for any acute intracranial abnormality  · PT/OT consult appreciated    Chronic respiratory failure with hypoxia (HCC)  Assessment & Plan  · History of chronic respiratory failure maintained outpatient on 2 L nasal cannula for COPD  · Currently on 4 L nasal cannula  · Continue to titrate oxygen to maintain saturations greater than 88%  · Continue home nebs p r n  Wheezing    Thoracic aortic aneurysm (HCC)  Assessment & Plan  · Status post TEVAR in 2018    Benign essential hypertension  Assessment & Plan  · Continue Lopressor, Norvasc  · Hold losartan, hydrochlorothiazide with elevated creatinine      VTE Pharmacologic Prophylaxis:  Lovenox    Patient Centered Rounds:  Patient care rounds were performed with nursing    Education and Discussions with Family / Patient:  Discussed with family at the bedside    Time Spent for Care: 30  More than 50% of total time spent on counseling and coordination of care as described above      Current Length of Stay: 3 day(s)    Current Patient Status: Inpatient   Certification Statement: The patient will continue to require additional inpatient hospital stay due to ongoing management of acute heart failure    Discharge Plan:  Discharge when renal function stable    Code Status: Level 1 - Full Code      Subjective:   Patient seen evaluated at bedside  He feels well today  Discussed plan of care with family at the bedside in over the telephone  Objective:     Vitals:   Temp (24hrs), Av 1 °F (36 7 °C), Min:97 5 °F (36 4 °C), Max:98 7 °F (37 1 °C)    Temp:  [97 5 °F (36 4 °C)-98 7 °F (37 1 °C)] 98 5 °F (36 9 °C)  HR:  [60-88] 60  Resp:  [18] 18  BP: ()/(52-61) 109/58  SpO2:  [90 %-96 %] 90 %  Body mass index is 22 27 kg/m²  Input and Output Summary (last 24 hours): Intake/Output Summary (Last 24 hours) at 3/15/2022 1409  Last data filed at 3/15/2022 1245  Gross per 24 hour   Intake 830 ml   Output 450 ml   Net 380 ml       Physical Exam:     Physical Exam  Vitals reviewed  Constitutional:       General: He is not in acute distress  Appearance: He is well-developed  He is not ill-appearing, toxic-appearing or diaphoretic  HENT:      Head: Normocephalic and atraumatic  Mouth/Throat:      Mouth: Mucous membranes are moist       Pharynx: No oropharyngeal exudate  Eyes:      General: No scleral icterus  Extraocular Movements: Extraocular movements intact  Conjunctiva/sclera: Conjunctivae normal    Cardiovascular:      Rate and Rhythm: Normal rate and regular rhythm  Heart sounds: Normal heart sounds  Pulmonary:      Effort: Pulmonary effort is normal  No respiratory distress  Breath sounds: Normal breath sounds  No wheezing or rales  Abdominal:      General: There is no distension  Palpations: Abdomen is soft  Tenderness: There is no abdominal tenderness  There is no guarding or rebound  Musculoskeletal:         General: No swelling, tenderness or deformity  Skin:     General: Skin is warm and dry  Neurological:      General: No focal deficit present  Mental Status: He is alert  Mental status is at baseline  Psychiatric:         Mood and Affect: Mood normal          Behavior: Behavior normal          Thought Content: Thought content normal          Judgment: Judgment normal          Additional Data:     Labs: I have reviewed pertinent results     Results from last 7 days   Lab Units 03/14/22  0634   WBC Thousand/uL 8 79   HEMOGLOBIN g/dL 13 7   HEMATOCRIT % 47 7   PLATELETS Thousands/uL 155   NEUTROS PCT % 72   LYMPHS PCT % 11*   MONOS PCT % 13*   EOS PCT % 4     Results from last 7 days   Lab Units 03/15/22  0600 03/13/22  0512 03/12/22  1555   SODIUM mmol/L 143   < > 144   POTASSIUM mmol/L 5 0   < > 4 1   CHLORIDE mmol/L 98*   < > 105   CO2 mmol/L 43*   < > 40*   BUN mg/dL 31*   < > 23   CREATININE mg/dL 1 38*   < > 1 13   ANION GAP mmol/L 2*   < > -1*   CALCIUM mg/dL 9 2   < > 8 9   ALBUMIN g/dL  --   --  3 3*   TOTAL BILIRUBIN mg/dL  --   --  0 64   ALK PHOS U/L  --   --  79   ALT U/L  --   --  40   AST U/L  --   --  48*   GLUCOSE RANDOM mg/dL 111   < > 116    < > = values in this interval not displayed           Results from last 7 days   Lab Units 03/15/22  1153   POC GLUCOSE mg/dl 116         Results from last 7 days   Lab Units 03/15/22  0600   PROCALCITONIN ng/ml 0 15         Imaging: I have reviewed pertinent imaging       Recent Cultures (last 7 days):           Last 24 Hours Medication List:   Current Facility-Administered Medications   Medication Dose Route Frequency Provider Last Rate    acetaminophen  650 mg Oral Q6H PRN RIZWANA Schmitz-PAZ      albuterol  2 puff Inhalation Q4H PRN Gaby Robison PA-C      amLODIPine  5 mg Oral Daily Gaby Robison PA-C      aspirin  81 mg Oral Daily Gaby Robison PA-C      atorvastatin  10 mg Oral Daily With Dinner Gaby Robison PA-C      budesonide-formoterol  2 puff Inhalation BID Jakob Comment, JEREMIE      calcium carbonate  1,000 mg Oral TID PRN Jakob Comment, JEREMIE      enoxaparin  40 mg Subcutaneous Daily Jakob Comment, JEREMIE      ferrous sulfate  325 mg Oral Daily With Breakfast Jakob Comment, JEREMIE      guaiFENesin  600 mg Oral Q12H [address] [place] [address]      ipratropium-albuterol  3 mL Nebulization Q6H PRN Brianne Rodarte DO      metoprolol tartrate  50 mg Oral Q12H [facility] Jakob Comment, JEREMIE      ondansetron  4 mg Intravenous Q6H PRN Jakob Comment, JEREMIE      pantoprazole  40 mg Oral Early Morning Jakob Comment, JEREMIE      potassium chloride  20 mEq Oral BID Lindsey More MD      sodium chloride (PF)  3 mL Intravenous Q1H PRN Jasmyn Taylor PA-C      umeclidinium bromide  1 puff Inhalation Daily Jakob Comment, JEREMIE          Today, Patient Was Seen By: Brianne Rodarte DO    ** Please Note: Dictation voice to text software may have been used in the creation of this document   **

## 2022-03-15 NOTE — QUICK NOTE
Notified by RN that patient is hypotensive to 80/60, MAP of 60  He was here for mild CHF exacerbation received IV diuresis which was stopped today secondary to elevation in creatinine  He is now developing hypotension  On exam, feels well, lungs are clear to auscultation, extremities are warm and well perfused  He does report moderate increased mucus production/cough  Family member at bedside notes that RSV has been going around the family  Suspect hypotension secondary to over diuresis, will give small 250 cc bolus  Will also add Mucinex and test for RSV

## 2022-03-15 NOTE — PROGRESS NOTES
Cardiology Progress Note   MD Lidya Patel MD Georgi Melia, DO, [facility]  MD Micheal Leonard DO, Alexei Cintron DO, [facility]  ----------------------------------------------------------------  [facility] [facility]  [address] [place] [address], [address] [place] [address] Road 82 y o  male MRN: [id number]  Unit/Bed#: E4 -01 Encounter: [id number]      ASSESSMENT:    Acute on chronic diastolic heart failure   Chronic respiratory failure on 2 L home O2   CAD with coronary artery calcifications by CT   Hypertension   Dyslipidemia   Descending thoracic aortic aneurysm s/p EVAR, December 2018   COPD   CKD stage IIIA   PVD   History of DVT   History of TIA   LVEF 67%, grade 1 diastolic dysfunction, moderate to severe LVH, mild RV dilatation with normal function, mild RA dilatation, mild-to-moderate TR w/ PASP 62 mmHg, March 2022    PLAN:  Echocardiogram demonstrated normal left ventricular function without severe valvular disease  There is evidence of pulmonary hypertension  With borderline creatinine overnight, would hold on further IV diuresis today  May consider transition to oral diuretic in the next 24 hours  Keep potassium greater than 4 and magnesium greater than 2  Strict I/Os and daily weights  Salt restricted and fluid-restricted diet recommended  Continue aspirin, statin, amlodipine and metoprolol  If weight is stable next 24 hours once restarted on oral diuretic, reasonable for discharge from CV perspective    Signed: Chad Hendrickson DO, [facility], FACP      History of Present Illness:  Patient seen and examined  Breathing has much improved  Denies chest pain, pressure, tightness or squeezing  Denies lightheadedness, dizziness or palpitations  Review of Systems:  Review of Systems   Constitutional: Negative for decreased appetite, fever, weight gain and weight loss  HENT: Negative for congestion and sore throat      Eyes: Negative for visual disturbance  Cardiovascular: Negative for chest pain, dyspnea on exertion, leg swelling, near-syncope and palpitations  Respiratory: Negative for cough and shortness of breath  Hematologic/Lymphatic: Negative for bleeding problem  Skin: Negative for rash  Musculoskeletal: Negative for myalgias and neck pain  Gastrointestinal: Negative for abdominal pain and nausea  Neurological: Negative for light-headedness and weakness  Psychiatric/Behavioral: Negative for depression  Allergies   Allergen Reactions    Penicillins Hives, Itching and Rash    Lisinopril Rash     Side pains and rash        No current facility-administered medications on file prior to encounter       Current Outpatient Medications on File Prior to Encounter   Medication Sig    acetaminophen (TYLENOL) 325 mg tablet Take 2 tablets (650 mg total) by mouth every 6 (six) hours as needed for fever (temperature greater than 101 F)    albuterol (2 5 mg/3 mL) 0 083 % nebulizer solution Take 1 vial (2 5 mg total) by nebulization every 6 (six) hours as needed for wheezing or shortness of breath    albuterol (VENTOLIN HFA) 90 mcg/act inhaler Inhale 2 puffs    amLODIPine (NORVASC) 10 mg tablet Take 0 5 tablets (5 mg total) by mouth daily    aspirin (Aspirin 81) 81 mg EC tablet Take 1 tablet (81 mg total) by mouth daily    atorvastatin (LIPITOR) 10 mg tablet Take 1 tablet (10 mg total) by mouth daily    ferrous sulfate 325 (65 Fe) mg tablet Take 325 mg by mouth daily with breakfast    hydrochlorothiazide (HYDRODIURIL) 12 5 mg tablet Take 12 5 mg by mouth daily    losartan (COZAAR) 100 MG tablet Take 100 mg by mouth daily      metoprolol tartrate (LOPRESSOR) 50 mg tablet Take 1 tablet (50 mg total) by mouth every 12 (twelve) hours    pantoprazole (PROTONIX) 40 mg tablet Take 1 tablet (40 mg total) by mouth daily    tiotropium (SPIRIVA) 18 mcg inhalation capsule Place 1 capsule (18 mcg total) into inhaler and inhale daily    budesonide-formoterol (SYMBICORT) 160-4 5 mcg/act inhaler Inhale 2 puffs 2 (two) times a day Rinse mouth after use      melatonin 3 mg Take 2 tablets (6 mg total) by mouth daily at bedtime (Patient not taking: Reported on 6/19/2020)        Current Facility-Administered Medications   Medication Dose Route Frequency Provider Last Rate    acetaminophen  650 mg Oral Q6H PRN Real Leigh PA-C      albuterol  2 puff Inhalation Q4H PRN Real Leigh PA-C      amLODIPine  5 mg Oral Daily Real Leigh PA-C      aspirin  81 mg Oral Daily Real Leigh PA-C      atorvastatin  10 mg Oral Daily With Dinner Real Leigh PA-C      budesonide-formoterol  2 puff Inhalation BID Real Leigh PA-C      calcium carbonate  1,000 mg Oral TID PRN Real Leigh PA-C      enoxaparin  40 mg Subcutaneous Daily Real Leigh PA-C      ferrous sulfate  325 mg Oral Daily With Breakfast Real Leigh PA-C      guaiFENesin  600 mg Oral Q12H [facility] [place]      ipratropium-albuterol  3 mL Nebulization Q6H PRN Mena Kidd DO      metoprolol tartrate  50 mg Oral Q12H [facility]      ondansetron  4 mg Intravenous Q6H PRN Real Leigh PA-C      pantoprazole  40 mg Oral Early Morning Real Leigh PA-C      potassium chloride  20 mEq Oral BID Gregorio Silver MD      sodium chloride (PF)  3 mL Intravenous Q1H PRN Koffi Taylor PA-C      umeclidinium bromide  1 puff Inhalation Daily Real Leigh PA-C              Vitals:    03/14/22 2300 03/15/22 0356 03/15/22 0747 03/15/22 0751   BP: 117/55 120/59  121/61   BP Location: Right arm Right arm  Right arm   Pulse: 80 70  88   Resp: 18 18  18   Temp: 98 1 °F (36 7 °C) 98 7 °F (37 1 °C)     TempSrc: Temporal Temporal     SpO2: 91% 94%  96%   Weight:   60 7 kg (133 lb 12 8 oz)    Height:             Intake/Output Summary (Last 24 hours) at 3/15/2022 0942  Last data filed at 3/15/2022 0854  Gross per 24 hour   Intake 710 ml   Output 450 ml   Net 260 ml       Weight change: -0 125 kg (-4 4 oz)    PHYSICAL EXAMINATION:  Gen: Awake, Alert, NAD  Head/eyes: AT/NC, pupils equal and round, Anicteric  ENT: mmm  Neck: Supple, No elevated JVP, trachea midline  Resp: CTA bilaterally no w/r/r  CV: RRR +S1, S2, No m/r/g  Abd: Soft, NT/ND + BS  Ext: no LE edema bilaterally  Neuro: Follows commands, moves all extermities  Psych: Appropriate affect, normal mood, pleasant attitude, non-combative  Skin: warm; no rash, erythema or venous stasis changes on exposed skin    Lab Results:  Results from last 7 days   Lab Units 03/14/22  0634   WBC Thousand/uL 8 79   HEMOGLOBIN g/dL 13 7   HEMATOCRIT % 47 7   PLATELETS Thousands/uL 155     Results from last 7 days   Lab Units 03/15/22  0600 03/13/22  0512 03/12/22  1555   POTASSIUM mmol/L 5 0   < > 4 1   CHLORIDE mmol/L 98*   < > 105   CO2 mmol/L 43*   < > 40*   BUN mg/dL 31*   < > 23   CREATININE mg/dL 1 38*   < > 1 13   CALCIUM mg/dL 9 2   < > 8 9   ALK PHOS U/L  --   --  79   ALT U/L  --   --  40   AST U/L  --   --  48*    < > = values in this interval not displayed  No results found for: TROPONINT                Tele: SR    This note was completed in part utilizing M-Modal Fluency Direct Software  Grammatical errors, random word insertions, spelling mistakes, and incomplete sentences may be an occasional consequence of this system secondary to software limitations, ambient noise, and hardware issues  If you have any questions or concerns about the content, text, or information contained within the body of this dictation, please contact the provider for clarification

## 2022-03-15 NOTE — ASSESSMENT & PLAN NOTE
Wt Readings from Last 3 Encounters:   03/15/22 60 7 kg (133 lb 12 8 oz)   11/09/21 67 kg (147 lb 12 8 oz)   08/11/21 64 kg (141 lb 1 5 oz)     · This is an 16-year-old male with history of COPD on chronic oxygen, CHF, hypertension presenting with dyspnea   · BNP 4000  · CXR with mild vascular congestion  · Echocardiogram with preserved ejection fraction  · Cardiology consultation appreciated  · Hold diuretics today given elevated creatinine  · Monitor I&O, daily weight, fluid restriction

## 2022-03-16 PROBLEM — N17.9 AKI (ACUTE KIDNEY INJURY) (HCC): Status: ACTIVE | Noted: 2022-03-16

## 2022-03-16 LAB
ANION GAP SERPL CALCULATED.3IONS-SCNC: 0 MMOL/L (ref 4–13)
BACTERIA UR QL AUTO: ABNORMAL /HPF
BILIRUB UR QL STRIP: NEGATIVE
BUN SERPL-MCNC: 33 MG/DL (ref 5–25)
CALCIUM SERPL-MCNC: 8.9 MG/DL (ref 8.3–10.1)
CHLORIDE SERPL-SCNC: 99 MMOL/L (ref 100–108)
CLARITY UR: CLEAR
CO2 SERPL-SCNC: 44 MMOL/L (ref 21–32)
COLOR UR: YELLOW
CREAT SERPL-MCNC: 1.44 MG/DL (ref 0.6–1.3)
GFR SERPL CREATININE-BSD FRML MDRD: 44 ML/MIN/1.73SQ M
GLUCOSE SERPL-MCNC: 108 MG/DL (ref 65–140)
GLUCOSE SERPL-MCNC: 132 MG/DL (ref 65–140)
GLUCOSE UR STRIP-MCNC: NEGATIVE MG/DL
HGB UR QL STRIP.AUTO: NEGATIVE
KETONES UR STRIP-MCNC: NEGATIVE MG/DL
LEUKOCYTE ESTERASE UR QL STRIP: NEGATIVE
MAGNESIUM SERPL-MCNC: 2 MG/DL (ref 1.6–2.6)
NITRITE UR QL STRIP: NEGATIVE
NON-SQ EPI CELLS URNS QL MICRO: ABNORMAL /HPF
PH UR STRIP.AUTO: 7 [PH]
POTASSIUM SERPL-SCNC: 4.2 MMOL/L (ref 3.5–5.3)
PROT UR STRIP-MCNC: ABNORMAL MG/DL
RBC #/AREA URNS AUTO: ABNORMAL /HPF
SODIUM SERPL-SCNC: 143 MMOL/L (ref 136–145)
SP GR UR STRIP.AUTO: 1.01 (ref 1–1.03)
UROBILINOGEN UR QL STRIP.AUTO: 1 E.U./DL
WBC #/AREA URNS AUTO: ABNORMAL /HPF

## 2022-03-16 PROCEDURE — 80048 BASIC METABOLIC PNL TOTAL CA: CPT | Performed by: INTERNAL MEDICINE

## 2022-03-16 PROCEDURE — 83735 ASSAY OF MAGNESIUM: CPT | Performed by: INTERNAL MEDICINE

## 2022-03-16 PROCEDURE — 99231 SBSQ HOSP IP/OBS SF/LOW 25: CPT | Performed by: INTERNAL MEDICINE

## 2022-03-16 PROCEDURE — 81001 URINALYSIS AUTO W/SCOPE: CPT | Performed by: INTERNAL MEDICINE

## 2022-03-16 PROCEDURE — 82948 REAGENT STRIP/BLOOD GLUCOSE: CPT

## 2022-03-16 PROCEDURE — 99232 SBSQ HOSP IP/OBS MODERATE 35: CPT | Performed by: INTERNAL MEDICINE

## 2022-03-16 RX ORDER — SODIUM CHLORIDE 9 MG/ML
50 INJECTION, SOLUTION INTRAVENOUS CONTINUOUS
Status: DISPENSED | OUTPATIENT
Start: 2022-03-16 | End: 2022-03-16

## 2022-03-16 RX ADMIN — METOPROLOL TARTRATE 50 MG: 50 TABLET, FILM COATED ORAL at 09:44

## 2022-03-16 RX ADMIN — FERROUS SULFATE TAB 325 MG (65 MG ELEMENTAL FE) 325 MG: 325 (65 FE) TAB at 09:44

## 2022-03-16 RX ADMIN — PANTOPRAZOLE SODIUM 40 MG: 40 TABLET, DELAYED RELEASE ORAL at 05:34

## 2022-03-16 RX ADMIN — BUDESONIDE AND FORMOTEROL FUMARATE DIHYDRATE 2 PUFF: 160; 4.5 AEROSOL RESPIRATORY (INHALATION) at 17:30

## 2022-03-16 RX ADMIN — UMECLIDINIUM 1 PUFF: 62.5 AEROSOL, POWDER ORAL at 09:48

## 2022-03-16 RX ADMIN — ENOXAPARIN SODIUM 40 MG: 40 INJECTION SUBCUTANEOUS at 09:58

## 2022-03-16 RX ADMIN — ATORVASTATIN CALCIUM 10 MG: 10 TABLET, FILM COATED ORAL at 17:27

## 2022-03-16 RX ADMIN — ASPIRIN 81 MG: 81 TABLET, COATED ORAL at 09:43

## 2022-03-16 RX ADMIN — GUAIFENESIN 600 MG: 600 TABLET, EXTENDED RELEASE ORAL at 21:11

## 2022-03-16 RX ADMIN — GUAIFENESIN 600 MG: 600 TABLET, EXTENDED RELEASE ORAL at 09:44

## 2022-03-16 RX ADMIN — SODIUM CHLORIDE 50 ML/HR: 0.9 INJECTION, SOLUTION INTRAVENOUS at 09:54

## 2022-03-16 RX ADMIN — BUDESONIDE AND FORMOTEROL FUMARATE DIHYDRATE 2 PUFF: 160; 4.5 AEROSOL RESPIRATORY (INHALATION) at 09:47

## 2022-03-16 RX ADMIN — METOPROLOL TARTRATE 50 MG: 50 TABLET, FILM COATED ORAL at 21:11

## 2022-03-16 NOTE — PROGRESS NOTES
[address] [facility]  Progress Note Prachi Ross [birth year], 82 y o  male MRN: [id number]  Unit/Bed#: E4 -01 Encounter: [id number]  Primary Care Provider: Denver Bender, MD   Date and time admitted to hospital: 3/12/2022  3:31 PM    * Acute on chronic diastolic congestive heart failure ([facility] [address] )  Assessment & Plan  Wt Readings from Last 3 Encounters:   03/16/22 63 1 kg (139 lb 1 8 oz)   11/09/21 67 kg (147 lb 12 8 oz)   08/11/21 64 kg (141 lb 1 5 oz)     · This is an 82-year-old male with history of COPD on chronic oxygen, CHF, hypertension presenting with dyspnea   · BNP 4000  · CXR with mild vascular congestion  · Echocardiogram with preserved ejection fraction  · Cardiology consultation appreciated  · Hold diuretics today given elevated creatinine  · Give 500 cc of normal saline  · Monitor I&O, daily weight, fluid restriction      BELLA (acute kidney injury) ([facility] [address] )  Assessment & Plan  Creatinine baseline 0 8-1 1  Creatinine 1 44 today in setting of diuresis  Provide gentle IV fluids today  Patient had negative PVR  Trend BMP    Lightheadedness  Assessment & Plan  · Patient also complains of intermittent lightheadedness with exertion  · CT head negative for any acute intracranial abnormality  · PT/OT consult appreciated    Chronic respiratory failure with hypoxia (HCC)  Assessment & Plan  · History of chronic respiratory failure maintained outpatient on 2 L nasal cannula for COPD  · Currently on 4 L nasal cannula  · Continue to titrate oxygen to maintain saturations greater than 88%  · Continue home nebs p r n   Wheezing    Thoracic aortic aneurysm [facility])  Assessment & Plan  · Status post TEVAR in 2018    Benign essential hypertension  Assessment & Plan  · Continue Lopressor, Norvasc  · Hold losartan, hydrochlorothiazide with elevated creatinine      VTE Pharmacologic Prophylaxis:  Lovenox    Patient Centered Rounds:  Patient care rounds were performed with nursing    Discussions with Specialists or Other Care Team Provider:  Cardiology    Education and Discussions with Family / Patient:  Discussed plan of care with family at the bedside    Time Spent for Care: 30  More than 50% of total time spent on counseling and coordination of care as described above  Current Length of Stay: 4 day(s)    Current Patient Status: Inpatient   Certification Statement: The patient will continue to require additional inpatient hospital stay due to ongoing management of heart failure, BELLA    Discharge Plan:  Potential discharge next 24 hours if renal function stable    Code Status: Level 1 - Full Code      Subjective:   Patient seen and evaluated at bedside  No overnight events  He feels well  Objective:     Vitals:   Temp (24hrs), Av 6 °F (36 4 °C), Min:97 2 °F (36 2 °C), Max:97 8 °F (36 6 °C)    Temp:  [97 2 °F (36 2 °C)-97 8 °F (36 6 °C)] 97 8 °F (36 6 °C)  HR:  [60-87] 87  Resp:  [15-22] 18  BP: ()/(52-70) 124/64  SpO2:  [90 %-92 %] 90 %  Body mass index is 23 15 kg/m²  Input and Output Summary (last 24 hours): Intake/Output Summary (Last 24 hours) at 3/16/2022 1232  Last data filed at 3/15/2022 1701  Gross per 24 hour   Intake 360 ml   Output 400 ml   Net -40 ml       Physical Exam:     Physical Exam  Vitals and nursing note reviewed  Constitutional:       General: He is not in acute distress  Appearance: He is well-developed  He is not ill-appearing, toxic-appearing or diaphoretic  HENT:      Head: Normocephalic and atraumatic  Mouth/Throat:      Mouth: Mucous membranes are dry  Pharynx: No oropharyngeal exudate  Eyes:      General: No scleral icterus  Extraocular Movements: Extraocular movements intact  Conjunctiva/sclera: Conjunctivae normal    Cardiovascular:      Rate and Rhythm: Normal rate and regular rhythm  Heart sounds: Normal heart sounds  No murmur heard        Pulmonary:      Effort: Pulmonary effort is normal  No respiratory distress  Breath sounds: Normal breath sounds  No wheezing or rales  Abdominal:      General: There is no distension  Palpations: Abdomen is soft  Tenderness: There is no abdominal tenderness  There is no guarding or rebound  Musculoskeletal:         General: No swelling, tenderness or deformity  Cervical back: Neck supple  Skin:     General: Skin is warm and dry  Neurological:      General: No focal deficit present  Mental Status: He is alert  Mental status is at baseline  Psychiatric:         Mood and Affect: Mood normal          Behavior: Behavior normal          Thought Content: Thought content normal          Judgment: Judgment normal          Additional Data:     Labs: I have reviewed pertinent results     Results from last 7 days   Lab Units 03/14/22  0634   WBC Thousand/uL 8 79   HEMOGLOBIN g/dL 13 7   HEMATOCRIT % 47 7   PLATELETS Thousands/uL 155   NEUTROS PCT % 72   LYMPHS PCT % 11*   MONOS PCT % 13*   EOS PCT % 4     Results from last 7 days   Lab Units 03/16/22  0442 03/13/22  0512 03/12/22  1555   SODIUM mmol/L 143   < > 144   POTASSIUM mmol/L 4 2   < > 4 1   CHLORIDE mmol/L 99*   < > 105   CO2 mmol/L 44*   < > 40*   BUN mg/dL 33*   < > 23   CREATININE mg/dL 1 44*   < > 1 13   ANION GAP mmol/L 0*   < > -1*   CALCIUM mg/dL 8 9   < > 8 9   ALBUMIN g/dL  --   --  3 3*   TOTAL BILIRUBIN mg/dL  --   --  0 64   ALK PHOS U/L  --   --  79   ALT U/L  --   --  40   AST U/L  --   --  48*   GLUCOSE RANDOM mg/dL 108   < > 116    < > = values in this interval not displayed           Results from last 7 days   Lab Units 03/16/22  0728 03/15/22  1153   POC GLUCOSE mg/dl 132 116         Results from last 7 days   Lab Units 03/15/22  0600   PROCALCITONIN ng/ml 0 15         Imaging: I have reviewed pertinent imaging       Recent Cultures (last 7 days):           Last 24 Hours Medication List:   Current Facility-Administered Medications   Medication Dose Route Frequency Provider Last Rate  acetaminophen  650 mg Oral Q6H PRN Susen Anchors, PA-C      albuterol  2 puff Inhalation Q4H PRN Susen Anchors, PA-C      amLODIPine  5 mg Oral Daily Susen Anchors, PA-C      aspirin  81 mg Oral Daily Susen Anchors, PA-C      atorvastatin  10 mg Oral Daily With Dinner Susen Anchors, PA-C      budesonide-formoterol  2 puff Inhalation BID Susen Anchors, PA-C      calcium carbonate  1,000 mg Oral TID PRN Susen Anchors, PA-C      enoxaparin  40 mg Subcutaneous Daily Susen Anchors, PA-C      ferrous sulfate  325 mg Oral Daily With Breakfast Susen Anchors, PA-C      guaiFENesin  600 mg Oral Q12H [address] [place] [address]      ipratropium-albuterol  3 mL Nebulization Q6H PRN Brandon Gao DO      metoprolol tartrate  50 mg Oral Q12H [facility] Susen Anchors, PA-C      ondansetron  4 mg Intravenous Q6H PRN Susen Anchors, PA-C      pantoprazole  40 mg Oral Early Morning Susen Anchors, PA-C      sodium chloride (PF)  3 mL Intravenous Q1H PRN Francisco Taylor, PA-PAZ      sodium chloride  50 mL/hr Intravenous Continuous Cleda Stare DO 50 mL/hr (03/16/22 0954)    umeclidinium bromide  1 puff Inhalation Daily [facility], JEREMIE          Today, Patient Was Seen By: Brandon Gao DO    ** Please Note: Dictation voice to text software may have been used in the creation of this document   **

## 2022-03-16 NOTE — PROGRESS NOTES
Cardiology Progress Note   MD Kalyn Mota MD Azalia Burlington, DO, [facility]  MD Brown Leonard DO, Antonia Adamson DO, [facility]  ----------------------------------------------------------------  [address] [facility]  [address] [place] [address], [address] [place] [address] Road 82 y o  male MRN: [id number]  Unit/Bed#: E4 -01 Encounter: [id number]      ASSESSMENT:    Acute on chronic diastolic heart failure   Chronic respiratory failure on 2 L home O2   CAD with coronary artery calcifications by CT   Hypertension   Dyslipidemia   Descending thoracic aortic aneurysm s/p EVAR, December 2018   COPD   CKD stage IIIA   PVD   History of DVT   History of TIA   LVEF 48%, grade 1 diastolic dysfunction, moderate to severe LVH, mild RV dilatation with normal function, mild RA dilatation, mild-to-moderate TR w/ PASP 62 mmHg, March 2022    PLAN:  Recommend checking orthostatic vital signs today  Would hold hydrochlorothiazide at discharge given patient's lightheadedness   Hold diuretics with elevated creatinine; reasonable to give back 500 mL  If renal function stabilizes, will transition to low-dose oral diuretic in the next 24-48 hours   Continue aspirin, statin, amlodipine and metoprolol   Continue to hold losartan  Keep potassium greater than 4 and magnesium greater than 2   Hopeful discharge from CV perspective in next 48 hours    Signed: Jessica Trujillo DO, [facility], FACP      History of Present Illness:  Patient seen and examined  Denies chest pain, pressure, tightness or squeezing  Denies lightheadedness, dizziness or palpitations  Denies lower extremity swelling, orthopnea or paroxysmal nocturnal dyspnea  Feels back to his baseline  Review of Systems:  Review of Systems   Constitutional: Negative for decreased appetite, fever, weight gain and weight loss  HENT: Negative for congestion and sore throat  Eyes: Negative for visual disturbance  Cardiovascular: Negative for chest pain, dyspnea on exertion, leg swelling, near-syncope and palpitations  Respiratory: Negative for cough and shortness of breath  Hematologic/Lymphatic: Negative for bleeding problem  Skin: Negative for rash  Musculoskeletal: Negative for myalgias and neck pain  Gastrointestinal: Negative for abdominal pain and nausea  Neurological: Negative for light-headedness and weakness  Psychiatric/Behavioral: Negative for depression  Allergies   Allergen Reactions    Penicillins Hives, Itching and Rash    Lisinopril Rash     Side pains and rash        No current facility-administered medications on file prior to encounter       Current Outpatient Medications on File Prior to Encounter   Medication Sig    acetaminophen (TYLENOL) 325 mg tablet Take 2 tablets (650 mg total) by mouth every 6 (six) hours as needed for fever (temperature greater than 101 F)    albuterol (2 5 mg/3 mL) 0 083 % nebulizer solution Take 1 vial (2 5 mg total) by nebulization every 6 (six) hours as needed for wheezing or shortness of breath    albuterol (VENTOLIN HFA) 90 mcg/act inhaler Inhale 2 puffs    amLODIPine (NORVASC) 10 mg tablet Take 0 5 tablets (5 mg total) by mouth daily    aspirin (Aspirin 81) 81 mg EC tablet Take 1 tablet (81 mg total) by mouth daily    atorvastatin (LIPITOR) 10 mg tablet Take 1 tablet (10 mg total) by mouth daily    ferrous sulfate 325 (65 Fe) mg tablet Take 325 mg by mouth daily with breakfast    hydrochlorothiazide (HYDRODIURIL) 12 5 mg tablet Take 12 5 mg by mouth daily    losartan (COZAAR) 100 MG tablet Take 100 mg by mouth daily      metoprolol tartrate (LOPRESSOR) 50 mg tablet Take 1 tablet (50 mg total) by mouth every 12 (twelve) hours    pantoprazole (PROTONIX) 40 mg tablet Take 1 tablet (40 mg total) by mouth daily    tiotropium (SPIRIVA) 18 mcg inhalation capsule Place 1 capsule (18 mcg total) into inhaler and inhale daily    budesonide-formoterol (SYMBICORT) 160-4 5 mcg/act inhaler Inhale 2 puffs 2 (two) times a day Rinse mouth after use      melatonin 3 mg Take 2 tablets (6 mg total) by mouth daily at bedtime (Patient not taking: Reported on 6/19/2020)        Current Facility-Administered Medications   Medication Dose Route Frequency Provider Last Rate    acetaminophen  650 mg Oral Q6H PRN Sunita Brunson PA-C      albuterol  2 puff Inhalation Q4H PRN Sunita Brunson PA-C      amLODIPine  5 mg Oral Daily Sunita Brunson PA-C      aspirin  81 mg Oral Daily Sunita Brunson PA-C      atorvastatin  10 mg Oral Daily With Dinner Sunita Brunson PA-C      budesonide-formoterol  2 puff Inhalation BID Sunita Brunson PA-C      calcium carbonate  1,000 mg Oral TID PRN Sunita Brunson PA-C      enoxaparin  40 mg Subcutaneous Daily Sunita Brunson PA-C      ferrous sulfate  325 mg Oral Daily With Breakfast Sunita Brunson PA-C      guaiFENesin  600 mg Oral Q12H [address] [place], [place]      ipratropium-albuterol  3 mL Nebulization Q6H PRN Aniceto Cade DO      metoprolol tartrate  50 mg Oral Q12H [address] Sunita Brunson PA-C      ondansetron  4 mg Intravenous Q6H PRN Sunita Brunson PA-C      pantoprazole  40 mg Oral Early Morning Sunita Brunson PA-C      sodium chloride (PF)  3 mL Intravenous Q1H PRN Wellington Taylor PA-C      umeclidinium bromide  1 puff Inhalation Daily Sunita Brunson PA-C              Vitals:    03/15/22 2131 03/15/22 2315 03/16/22 0306 03/16/22 0551   BP: 101/70 112/61 109/58    BP Location: Right arm Right arm Right arm    Pulse: 79 75 82    Resp:  15 18    Temp:  (!) 97 2 °F (36 2 °C) (!) 97 4 °F (36 3 °C)    TempSrc:  Temporal Temporal    SpO2:  91% 92%    Weight:    63 1 kg (139 lb 1 8 oz)   Height:             Intake/Output Summary (Last 24 hours) at 3/16/2022 0920  Last data filed at 3/15/2022 1701  Gross per 24 hour   Intake 360 ml   Output 400 ml   Net -40 ml Weight change: 0 816 kg (1 lb 12 8 oz)    PHYSICAL EXAMINATION:  Gen: Awake, Alert, NAD  Head/eyes: AT/NC, pupils equal and round, Anicteric  ENT: mmm  Neck: Supple, No elevated JVP, trachea midline  Resp: CTA bilaterally no w/r/r  CV: RRR +S1, S2, No m/r/g  Abd: Soft, NT/ND + BS  Ext: no LE edema bilaterally  Neuro: Follows commands, moves all extermities  Psych: Appropriate affect, happy mood, pleasant attitude, non-combative  Skin: warm; no rash, erythema or venous stasis changes on exposed skin    Lab Results:  Results from last 7 days   Lab Units 03/14/22  0634   WBC Thousand/uL 8 79   HEMOGLOBIN g/dL 13 7   HEMATOCRIT % 47 7   PLATELETS Thousands/uL 155     Results from last 7 days   Lab Units 03/16/22  0442 03/13/22  0512 03/12/22  1555   POTASSIUM mmol/L 4 2   < > 4 1   CHLORIDE mmol/L 99*   < > 105   CO2 mmol/L 44*   < > 40*   BUN mg/dL 33*   < > 23   CREATININE mg/dL 1 44*   < > 1 13   CALCIUM mg/dL 8 9   < > 8 9   ALK PHOS U/L  --   --  79   ALT U/L  --   --  40   AST U/L  --   --  48*    < > = values in this interval not displayed  No results found for: TROPONINT                Tele: SR    This note was completed in part utilizing M-Modal Fluency Direct Software  Grammatical errors, random word insertions, spelling mistakes, and incomplete sentences may be an occasional consequence of this system secondary to software limitations, ambient noise, and hardware issues  If you have any questions or concerns about the content, text, or information contained within the body of this dictation, please contact the provider for clarification

## 2022-03-16 NOTE — ASSESSMENT & PLAN NOTE
Creatinine baseline 0 8-1 1  Creatinine 1 44 today in setting of diuresis  Provide gentle IV fluids today  Patient had negative PVR  Trend BMP

## 2022-03-16 NOTE — ASSESSMENT & PLAN NOTE
Wt Readings from Last 3 Encounters:   03/16/22 63 1 kg (139 lb 1 8 oz)   11/09/21 67 kg (147 lb 12 8 oz)   08/11/21 64 kg (141 lb 1 5 oz)     · This is an 45-year-old male with history of COPD on chronic oxygen, CHF, hypertension presenting with dyspnea   · BNP 4000  · CXR with mild vascular congestion  · Echocardiogram with preserved ejection fraction  · Cardiology consultation appreciated  · Hold diuretics today given elevated creatinine  · Give 500 cc of normal saline  · Monitor I&O, daily weight, fluid restriction

## 2022-03-16 NOTE — PLAN OF CARE
Problem: Potential for Falls  Goal: Patient will remain free of falls  Description: INTERVENTIONS:  - Educate patient/family on patient safety including physical limitations  - Instruct patient to call for assistance with activity   - Consult OT/PT to assist with strengthening/mobility   - Keep Call bell within reach  - Keep bed low and locked with side rails adjusted as appropriate  - Keep care items and personal belongings within reach  - Initiate and maintain comfort rounds  - Make Fall Risk Sign visible to staff  - Offer Toileting every 2 Hours, in advance of need  - Initiate/Maintain bed alarm  - Obtain necessary fall risk management equipment: bed alarm  - Apply yellow socks and bracelet for high fall risk patients  - Consider moving patient to room near nurses station  Outcome: Progressing     Problem: PAIN - ADULT  Goal: Verbalizes/displays adequate comfort level or baseline comfort level  Description: Interventions:  - Encourage patient to monitor pain and request assistance  - Assess pain using appropriate pain scale  - Administer analgesics based on type and severity of pain and evaluate response  - Implement non-pharmacological measures as appropriate and evaluate response  - Consider cultural and social influences on pain and pain management  - Notify physician/advanced practitioner if interventions unsuccessful or patient reports new pain  Outcome: Progressing     Problem: INFECTION - ADULT  Goal: Absence or prevention of progression during hospitalization  Description: INTERVENTIONS:  - Assess and monitor for signs and symptoms of infection  - Monitor lab/diagnostic results  - Monitor all insertion sites, i e  indwelling lines, tubes, and drains  - Monitor endotracheal if appropriate and nasal secretions for changes in amount and color  - [place] appropriate cooling/warming therapies per order  - Administer medications as ordered  - Instruct and encourage patient and family to use good hand hygiene technique  - Identify and instruct in appropriate isolation precautions for identified infection/condition  Outcome: Progressing     Problem: SAFETY ADULT  Goal: Patient will remain free of falls  Description: INTERVENTIONS:  - Educate patient/family on patient safety including physical limitations  - Instruct patient to call for assistance with activity   - Consult OT/PT to assist with strengthening/mobility   - Keep Call bell within reach  - Keep bed low and locked with side rails adjusted as appropriate  - Keep care items and personal belongings within reach  - Initiate and maintain comfort rounds  - Make Fall Risk Sign visible to staff  - Offer Toileting every 2 Hours, in advance of need  - Initiate/Maintain bed alarm  - Obtain necessary fall risk management equipment: bed alarm  - Apply yellow socks and bracelet for high fall risk patients  - Consider moving patient to room near nurses station  Outcome: Progressing  Goal: Maintain or return to baseline ADL function  Description: INTERVENTIONS:  - Educate patient/family on patient safety including physical limitations  - Instruct patient to call for assistance with activity   - Consult OT/PT to assist with strengthening/mobility   - Keep Call bell within reach  - Keep bed low and locked with side rails adjusted as appropriate  - Keep care items and personal belongings within reach  - Initiate and maintain comfort rounds  - Make Fall Risk Sign visible to staff  - Offer Toileting every 2 Hours, in advance of need  - Initiate/Maintain bed alarm  - Obtain necessary fall risk management equipment: bed alarm  - Apply yellow socks and bracelet for high fall risk patients  - Consider moving patient to room near nurses station  Outcome: Progressing  Goal: Maintains/Returns to pre admission functional level  Description: INTERVENTIONS:  - Perform BMAT or MOVE assessment daily    - Set and communicate daily mobility goal to care team and patient/family/caregiver  - Collaborate with rehabilitation services on mobility goals if consulted  - Perform Range of Motion 3 times a day  - Reposition patient every 3 hours  - Dangle patient 3 times a day  - Stand patient 3 times a day  - Ambulate patient 3 times a day  - Out of bed to chair 3 times a day   - Out of bed for meals 3 times a day  - Out of bed for toileting  - Record patient progress and toleration of activity level   Outcome: Progressing     Problem: DISCHARGE PLANNING  Goal: Discharge to home or other facility with appropriate resources  Description: INTERVENTIONS:  - Identify barriers to discharge w/patient and caregiver  - Arrange for needed discharge resources and transportation as appropriate  - Identify discharge learning needs (meds, wound care, etc )  - Arrange for interpretive services to assist at discharge as needed  - Refer to Case Management Department for coordinating discharge planning if the patient needs post-hospital services based on physician/advanced practitioner order or complex needs related to functional status, cognitive ability, or social support system  Outcome: Progressing     Problem: Knowledge Deficit  Goal: Patient/family/caregiver demonstrates understanding of disease process, treatment plan, medications, and discharge instructions  Description: Complete learning assessment and assess knowledge base    Interventions:  - Provide teaching at level of understanding  - Provide teaching via preferred learning methods  Outcome: Progressing     Problem: MOBILITY - ADULT  Goal: Maintain or return to baseline ADL function  Description: INTERVENTIONS:  - Educate patient/family on patient safety including physical limitations  - Instruct patient to call for assistance with activity   - Consult OT/PT to assist with strengthening/mobility   - Keep Call bell within reach  - Keep bed low and locked with side rails adjusted as appropriate  - Keep care items and personal belongings within reach  - Initiate and maintain comfort rounds  - Make Fall Risk Sign visible to staff  - Offer Toileting every 2 Hours, in advance of need  - Initiate/Maintain bed alarm  - Obtain necessary fall risk management equipment: bed alarm  - Apply yellow socks and bracelet for high fall risk patients  - Consider moving patient to room near nurses station  Outcome: Progressing  Goal: Maintains/Returns to pre admission functional level  Description: INTERVENTIONS:  - Perform BMAT or MOVE assessment daily    - Set and communicate daily mobility goal to care team and patient/family/caregiver  - Collaborate with rehabilitation services on mobility goals if consulted  - Perform Range of Motion 3 times a day  - Reposition patient every 3 hours    - Dangle patient 3 times a day  - Stand patient 3 times a day  - Ambulate patient 3 times a day  - Out of bed to chair 3 times a day   - Out of bed for meals 3 times a day  - Out of bed for toileting  - Record patient progress and toleration of activity level   Outcome: Progressing     Problem: Prexisting or High Potential for Compromised Skin Integrity  Goal: Skin integrity is maintained or improved  Description: INTERVENTIONS:  - Identify patients at risk for skin breakdown  - Assess and monitor skin integrity  - Assess and monitor nutrition and hydration status  - Monitor labs   - Assess for incontinence   - Turn and reposition patient  - Assist with mobility/ambulation  - Relieve pressure over bony prominences  - Avoid friction and shearing  - Provide appropriate hygiene as needed including keeping skin clean and dry  - Evaluate need for skin moisturizer/barrier cream  - Collaborate with interdisciplinary team   - Patient/family teaching  - Consider wound care consult   Outcome: Progressing

## 2022-03-16 NOTE — PLAN OF CARE
Problem: Potential for Falls  Goal: Patient will remain free of falls  Description: INTERVENTIONS:  - Educate patient/family on patient safety including physical limitations  - Instruct patient to call for assistance with activity   - Consult OT/PT to assist with strengthening/mobility   - Keep Call bell within reach  - Keep bed low and locked with side rails adjusted as appropriate  - Keep care items and personal belongings within reach  - Initiate and maintain comfort rounds  - Make Fall Risk Sign visible to staff  - Offer Toileting every 2 Hours, in advance of need  - Initiate/Maintain bed alarm  - Obtain necessary fall risk management equipment: bed alarm  - Apply yellow socks and bracelet for high fall risk patients  - Consider moving patient to room near nurses station  Outcome: Progressing     Problem: PAIN - ADULT  Goal: Verbalizes/displays adequate comfort level or baseline comfort level  Description: Interventions:  - Encourage patient to monitor pain and request assistance  - Assess pain using appropriate pain scale  - Administer analgesics based on type and severity of pain and evaluate response  - Implement non-pharmacological measures as appropriate and evaluate response  - Consider cultural and social influences on pain and pain management  - Notify physician/advanced practitioner if interventions unsuccessful or patient reports new pain  Outcome: Progressing     Problem: INFECTION - ADULT  Goal: Absence or prevention of progression during hospitalization  Description: INTERVENTIONS:  - Assess and monitor for signs and symptoms of infection  - Monitor lab/diagnostic results  - Monitor all insertion sites, i e  indwelling lines, tubes, and drains  - Monitor endotracheal if appropriate and nasal secretions for changes in amount and color  - [place] appropriate cooling/warming therapies per order  - Administer medications as ordered  - Instruct and encourage patient and family to use good hand hygiene technique  - Identify and instruct in appropriate isolation precautions for identified infection/condition  Outcome: Progressing     Problem: SAFETY ADULT  Goal: Patient will remain free of falls  Description: INTERVENTIONS:  - Educate patient/family on patient safety including physical limitations  - Instruct patient to call for assistance with activity   - Consult OT/PT to assist with strengthening/mobility   - Keep Call bell within reach  - Keep bed low and locked with side rails adjusted as appropriate  - Keep care items and personal belongings within reach  - Initiate and maintain comfort rounds  - Make Fall Risk Sign visible to staff  - Offer Toileting every 2 Hours, in advance of need  - Initiate/Maintain bed alarm  - Obtain necessary fall risk management equipment: bed alarm  - Apply yellow socks and bracelet for high fall risk patients  - Consider moving patient to room near nurses station  Outcome: Progressing  Goal: Maintain or return to baseline ADL function  Description: INTERVENTIONS:  - Educate patient/family on patient safety including physical limitations  - Instruct patient to call for assistance with activity   - Consult OT/PT to assist with strengthening/mobility   - Keep Call bell within reach  - Keep bed low and locked with side rails adjusted as appropriate  - Keep care items and personal belongings within reach  - Initiate and maintain comfort rounds  - Make Fall Risk Sign visible to staff  - Offer Toileting every 2 Hours, in advance of need  - Initiate/Maintain bed alarm  - Obtain necessary fall risk management equipment: bed alarm  - Apply yellow socks and bracelet for high fall risk patients  - Consider moving patient to room near nurses station  Outcome: Progressing  Goal: Maintains/Returns to pre admission functional level  Description: INTERVENTIONS:  - Perform BMAT or MOVE assessment daily    - Set and communicate daily mobility goal to care team and patient/family/caregiver  - Collaborate with rehabilitation services on mobility goals if consulted  - Perform Range of Motion  times a day  - Reposition patient every  hours  - Dangle patient  times a day  - Stand patient  times a day  - Ambulate patient  times a day  - Out of bed to chair  times a day   - Out of bed for meals  times a day  - Out of bed for toileting  - Record patient progress and toleration of activity level   Outcome: Progressing     Problem: DISCHARGE PLANNING  Goal: Discharge to home or other facility with appropriate resources  Description: INTERVENTIONS:  - Identify barriers to discharge w/patient and caregiver  - Arrange for needed discharge resources and transportation as appropriate  - Identify discharge learning needs (meds, wound care, etc )  - Arrange for interpretive services to assist at discharge as needed  - Refer to Case Management Department for coordinating discharge planning if the patient needs post-hospital services based on physician/advanced practitioner order or complex needs related to functional status, cognitive ability, or social support system  Outcome: Progressing     Problem: Knowledge Deficit  Goal: Patient/family/caregiver demonstrates understanding of disease process, treatment plan, medications, and discharge instructions  Description: Complete learning assessment and assess knowledge base    Interventions:  - Provide teaching at level of understanding  - Provide teaching via preferred learning methods  Outcome: Progressing     Problem: MOBILITY - ADULT  Goal: Maintain or return to baseline ADL function  Description: INTERVENTIONS:  - Educate patient/family on patient safety including physical limitations  - Instruct patient to call for assistance with activity   - Consult OT/PT to assist with strengthening/mobility   - Keep Call bell within reach  - Keep bed low and locked with side rails adjusted as appropriate  - Keep care items and personal belongings within reach  - Initiate and maintain comfort rounds  - Make Fall Risk Sign visible to staff  - Offer Toileting every 2 Hours, in advance of need  - Initiate/Maintain bed alarm  - Obtain necessary fall risk management equipment: bed alarm  - Apply yellow socks and bracelet for high fall risk patients  - Consider moving patient to room near nurses station  Outcome: Progressing  Goal: Maintains/Returns to pre admission functional level  Description: INTERVENTIONS:  - Perform BMAT or MOVE assessment daily    - Set and communicate daily mobility goal to care team and patient/family/caregiver  - Collaborate with rehabilitation services on mobility goals if consulted  - Perform Range of Motion  times a day  - Reposition patient every  hours    - Dangle patient  times a day  - Stand patient  times a day  - Ambulate patient  times a day  - Out of bed to chair  times a day   - Out of bed for meals  times a day  - Out of bed for toileting  - Record patient progress and toleration of activity level   Outcome: Progressing     Problem: Prexisting or High Potential for Compromised Skin Integrity  Goal: Skin integrity is maintained or improved  Description: INTERVENTIONS:  - Identify patients at risk for skin breakdown  - Assess and monitor skin integrity  - Assess and monitor nutrition and hydration status  - Monitor labs   - Assess for incontinence   - Turn and reposition patient  - Assist with mobility/ambulation  - Relieve pressure over bony prominences  - Avoid friction and shearing  - Provide appropriate hygiene as needed including keeping skin clean and dry  - Evaluate need for skin moisturizer/barrier cream  - Collaborate with interdisciplinary team   - Patient/family teaching  - Consider wound care consult   Outcome: Progressing     Problem: CARDIOVASCULAR - ADULT  Goal: Maintains optimal cardiac output and hemodynamic stability  Description: INTERVENTIONS:  - Monitor I/O, vital signs and rhythm  - Monitor for S/S and trends of decreased cardiac output  - Administer and titrate ordered vasoactive medications to optimize hemodynamic stability  - Assess quality of pulses, skin color and temperature  - Assess for signs of decreased coronary artery perfusion  - Instruct patient to report change in severity of symptoms  Outcome: Progressing  Goal: Absence of cardiac dysrhythmias or at baseline rhythm  Description: INTERVENTIONS:  - Continuous cardiac monitoring, vital signs, obtain 12 lead EKG if ordered  - Administer antiarrhythmic and heart rate control medications as ordered  - Monitor electrolytes and administer replacement therapy as ordered  Outcome: Progressing     Problem: RESPIRATORY - ADULT  Goal: Achieves optimal ventilation and oxygenation  Description: INTERVENTIONS:  - Assess for changes in respiratory status  - Assess for changes in mentation and behavior  - Position to facilitate oxygenation and minimize respiratory effort  - Oxygen administered by appropriate delivery if ordered  - Initiate smoking cessation education as indicated  - Encourage broncho-pulmonary hygiene including cough, deep breathe, Incentive Spirometry  - Assess the need for suctioning and aspirate as needed  - Assess and instruct to report SOB or any respiratory difficulty  - Respiratory Therapy support as indicated  Outcome: Progressing

## 2022-03-17 VITALS
RESPIRATION RATE: 18 BRPM | SYSTOLIC BLOOD PRESSURE: 152 MMHG | HEIGHT: 65 IN | HEART RATE: 61 BPM | OXYGEN SATURATION: 93 % | BODY MASS INDEX: 22.85 KG/M2 | WEIGHT: 137.13 LBS | DIASTOLIC BLOOD PRESSURE: 68 MMHG | TEMPERATURE: 98.1 F

## 2022-03-17 LAB
ANION GAP SERPL CALCULATED.3IONS-SCNC: 3 MMOL/L (ref 4–13)
BUN SERPL-MCNC: 29 MG/DL (ref 5–25)
CALCIUM SERPL-MCNC: 8.4 MG/DL (ref 8.3–10.1)
CHLORIDE SERPL-SCNC: 103 MMOL/L (ref 100–108)
CO2 SERPL-SCNC: 40 MMOL/L (ref 21–32)
CREAT SERPL-MCNC: 1.14 MG/DL (ref 0.6–1.3)
GFR SERPL CREATININE-BSD FRML MDRD: 58 ML/MIN/1.73SQ M
GLUCOSE SERPL-MCNC: 91 MG/DL (ref 65–140)
POTASSIUM SERPL-SCNC: 4.6 MMOL/L (ref 3.5–5.3)
SODIUM SERPL-SCNC: 146 MMOL/L (ref 136–145)

## 2022-03-17 PROCEDURE — 97535 SELF CARE MNGMENT TRAINING: CPT

## 2022-03-17 PROCEDURE — 80048 BASIC METABOLIC PNL TOTAL CA: CPT | Performed by: INTERNAL MEDICINE

## 2022-03-17 PROCEDURE — 99239 HOSP IP/OBS DSCHRG MGMT >30: CPT | Performed by: INTERNAL MEDICINE

## 2022-03-17 PROCEDURE — 97530 THERAPEUTIC ACTIVITIES: CPT

## 2022-03-17 PROCEDURE — 99232 SBSQ HOSP IP/OBS MODERATE 35: CPT | Performed by: INTERNAL MEDICINE

## 2022-03-17 PROCEDURE — 97116 GAIT TRAINING THERAPY: CPT

## 2022-03-17 PROCEDURE — 97110 THERAPEUTIC EXERCISES: CPT

## 2022-03-17 RX ORDER — FUROSEMIDE 20 MG/1
10 TABLET ORAL DAILY
Qty: 30 TABLET | Refills: 0 | Status: SHIPPED | OUTPATIENT
Start: 2022-03-17 | End: 2022-05-19 | Stop reason: DRUGHIGH

## 2022-03-17 RX ADMIN — ASPIRIN 81 MG: 81 TABLET, COATED ORAL at 09:42

## 2022-03-17 RX ADMIN — FERROUS SULFATE TAB 325 MG (65 MG ELEMENTAL FE) 325 MG: 325 (65 FE) TAB at 09:42

## 2022-03-17 RX ADMIN — GUAIFENESIN 600 MG: 600 TABLET, EXTENDED RELEASE ORAL at 09:42

## 2022-03-17 RX ADMIN — AMLODIPINE BESYLATE 5 MG: 5 TABLET ORAL at 09:43

## 2022-03-17 RX ADMIN — PANTOPRAZOLE SODIUM 40 MG: 40 TABLET, DELAYED RELEASE ORAL at 05:45

## 2022-03-17 RX ADMIN — METOPROLOL TARTRATE 50 MG: 50 TABLET, FILM COATED ORAL at 09:42

## 2022-03-17 RX ADMIN — BUDESONIDE AND FORMOTEROL FUMARATE DIHYDRATE 2 PUFF: 160; 4.5 AEROSOL RESPIRATORY (INHALATION) at 09:45

## 2022-03-17 RX ADMIN — UMECLIDINIUM 1 PUFF: 62.5 AEROSOL, POWDER ORAL at 09:45

## 2022-03-17 NOTE — NURSING NOTE
Pt discharged, wheeled out by PCA with spouse  Pt stable at this time  IV removed  AVS reviewed with pt and spouse  No further questions at this time  No belongings left in room, no papers left in chart

## 2022-03-17 NOTE — ASSESSMENT & PLAN NOTE
· History of chronic respiratory failure maintained outpatient on 2 L nasal cannula for COPD  · At home oxygen requirement  · Continue to titrate oxygen to maintain saturations greater than 88%  · Continue home nebs p r n   Wheezing

## 2022-03-17 NOTE — PLAN OF CARE
Problem: OCCUPATIONAL THERAPY ADULT  Goal: Performs self-care activities at highest level of function for planned discharge setting  See evaluation for individualized goals  Description: Treatment Interventions: ADL retraining,Functional transfer training,UE strengthening/ROM,Endurance training,Cognitive reorientation,Patient/family training,Equipment evaluation/education,Compensatory technique education,Energy conservation,Activityengagement          See flowsheet documentation for full assessment, interventions and recommendations  Outcome: Progressing  Note: Limitation: Decreased ADL status,Decreased UE strength,Decreased Safe judgement during ADL,Decreased cognition,Decreased endurance,Decreased self-care trans,Decreased high-level ADLs  Prognosis: Good  Assessment: Pt was seen for skilled OT with focus on review of CHF packet in both [place] and English with Pt and spouse, review of fall prevention and review of current plan of care  The Pt's spouse spoke for the Pt and translated through out tx session  Pt's spouse was able to follow French CHF packet with good understanding  Reviewed salt free diet with Pt and spouse to promoted optimal outcome with current health status  The Pt and spouse report having better understanding of need for an emergency response system in their home environment to achieve good safety for both parties  See above levels of A required for all functional tasks  The patient's raw score on the AM-PAC Daily Activity inpatient short form is 19, standardized score is 40 22, less than 39 4  Patients at this level are likely to benefit from discharge to post-acute rehabilitation services        OT Discharge Recommendation: Home with home health rehabilitation (home with home health and cont family support  )  OT - OK to Discharge: Yes (when medically cleared  )

## 2022-03-17 NOTE — PROGRESS NOTES
Cardiology         Progress Note - Card[facility]   [facility] [id number]  Unit/Bed#: E4 -01 Encounter: [id number]          Assessment/Recommendations/Discussion:   1  Acute on chronic diastolic CHF  2  Chronic hypoxic respiratory failure, on 2 L home oxygen  3  CAD with coronary calcifications by CT  4  COPD  5  Hypertension  6  Dyslipidemia  7  Descending thoracic aortic aneurysm status post EVAR 12/2018  8  COPD  9  History of DVT  10  History of TIA    · Volume status improved, renal function improved after some volume given back yesterday  Okay to discharge today from cardiac standpoint  We discharged on very low-dose furosemide, 10 mg daily  · Continue aspirin, statin, amlodipine, metoprolol  · Will continue to hold losartan for now  Can add back in outpatient setting once renal function normalizes   · Blood pressure on telemetry okay  · Discussed with patient's son over the phone, wife at bedside        Subjective:  Patient seen examined, feels well today, no complaints    Son present over the phone help with translation          Physical Exam:  GEN:  NAD  HEENT:  MMM, NCAT, pink conjunctiva, EOMI, nonicteric sclera  CV:  NO JVD/HJR, RR, NO M/R/G, +S1/S2, NO PARASTERNAL HEAVE/THRILL, NO LE EDEMA, NO HEPATIC SYSTOLIC PULSATION, WARM EXTREMITIES  RESP:  CTAB/L  ABD:  SOFT, NT, NO GROSS ORGANOMEGALY        Vitals:   /71 (BP Location: Left arm)   Pulse 72   Temp (!) 97 2 °F (36 2 °C) (Temporal)   Resp 18   Ht 5' 5" (1 651 m)   Wt 62 2 kg (137 lb 2 oz)   SpO2 90%   BMI 22 82 kg/m²   Vitals:    03/16/22 0551 03/17/22 0534   Weight: 63 1 kg (139 lb 1 8 oz) 62 2 kg (137 lb 2 oz)       Intake/Output Summary (Last 24 hours) at 3/17/2022 1101  Last data filed at 3/17/2022 0500  Gross per 24 hour   Intake --   Output 625 ml   Net -625 ml       TELEMETRY:  Sinus rhythm  Lab Results:  Results from last 7 days   Lab Units 03/14/22  0634   WBC Thousand/uL 8 79   HEMOGLOBIN g/dL 13  7   HEMATOCRIT % 47 7   PLATELETS Thousands/uL 155     Results from last 7 days   Lab Units 03/17/22  0517 03/13/22  0512 03/12/22  1555   POTASSIUM mmol/L 4 6   < > 4 1   CHLORIDE mmol/L 103   < > 105   CO2 mmol/L 40*   < > 40*   BUN mg/dL 29*   < > 23   CREATININE mg/dL 1 14   < > 1 13   CALCIUM mg/dL 8 4   < > 8 9   ALK PHOS U/L  --   --  79   ALT U/L  --   --  40   AST U/L  --   --  48*    < > = values in this interval not displayed       Results from last 7 days   Lab Units 03/17/22  0517   POTASSIUM mmol/L 4 6   CHLORIDE mmol/L 103   CO2 mmol/L 40*   BUN mg/dL 29*   CREATININE mg/dL 1 14   CALCIUM mg/dL 8 4           Medications:    Current Facility-Administered Medications:     acetaminophen (TYLENOL) tablet 650 mg, 650 mg, Oral, Q6H PRN, Raj Young PA-C, 650 mg at 03/13/22 2205    albuterol (PROVENTIL HFA,VENTOLIN HFA) inhaler 2 puff, 2 puff, Inhalation, Q4H PRN, Raj Young PA-C    amLODIPine (NORVASC) tablet 5 mg, 5 mg, Oral, Daily, Raj Young PA-C, 5 mg at 03/17/22 0943    aspirin (ECOTRIN LOW STRENGTH) EC tablet 81 mg, 81 mg, Oral, Daily, Raj Young PA-C, 81 mg at 03/17/22 3562    atorvastatin (LIPITOR) tablet 10 mg, 10 mg, Oral, Daily With Dinner, Raj Young PA-C, 10 mg at 03/16/22 1727    budesonide-formoterol (SYMBICORT) 160-4 5 mcg/act inhaler 2 puff, 2 puff, Inhalation, BID, Raj Young PA-C, 2 puff at 03/17/22 0945    calcium carbonate (TUMS) chewable tablet 1,000 mg, 1,000 mg, Oral, TID PRN, Raj Young PA-C    enoxaparin (LOVENOX) subcutaneous injection 40 mg, 40 mg, Subcutaneous, Daily, Raj Young PA-C, 40 mg at 03/16/22 2095    ferrous sulfate tablet 325 mg, 325 mg, Oral, Daily With Breakfast, Raj Young PA-C, 325 mg at 03/17/22 0942    guaiFENesin (MUCINEX) 12 hr tablet 600 mg, 600 mg, Oral, Q12H [address], Shan [place] PA-C, 600 mg at 03/17/22 0942    ipratropium-albuterol (DUO-NEB) 0 5-2 5 mg/3 mL inhalation solution 3 mL, 3 mL, Nebulization, Q6H PRN, Bartolo Batista DO    metoprolol tartrate (LOPRESSOR) tablet 50 mg, 50 mg, Oral, Q12H [facility], Miki Mayfield PA-C, 50 mg at 03/17/22 0942    ondansetron (ZOFRAN) injection 4 mg, 4 mg, Intravenous, Q6H PRN, Miki Mayfield PA-C    pantoprazole (PROTONIX) EC tablet 40 mg, 40 mg, Oral, Early Morning, Miki Mayfield PA-C, 40 mg at 03/17/22 0545    Insert peripheral IV, , , Once **AND** sodium chloride (PF) 0 9 % injection 3 mL, 3 mL, Intravenous, Q1H PRN, Perez Taylor PA-C    umeclidinium bromide (INCRUSE ELLIPTA) 62 5 mcg/inh inhaler AEPB 1 puff, 1 puff, Inhalation, Daily, Miki Mayfield PA-C, 1 puff at 03/17/22 0945    This note was completed in part utilizing M-Modal Fluency Direct Software  Grammatical errors, random word insertions, spelling mistakes, and incomplete sentences may be an occasional consequence of this system secondary to software limitations, ambient noise, and hardware issues  If you have any questions or concerns about the content, text, or information contained within the body of this dictation, please contact the provider for clarification

## 2022-03-17 NOTE — ASSESSMENT & PLAN NOTE
Wt Readings from Last 3 Encounters:   03/17/22 62 2 kg (137 lb 2 oz)   11/09/21 67 kg (147 lb 12 8 oz)   08/11/21 64 kg (141 lb 1 5 oz)     · This is an 29-year-old male with history of COPD on chronic oxygen, CHF, hypertension presenting with dyspnea   · BNP 4000  · CXR with mild vascular congestion  · Echocardiogram with preserved ejection fraction  · Required several days of IV diuresis  · Discharged on Lasix 10 mg daily  · Outpatient BMP in 1 week  · Outpatient follow-up with Cardiology

## 2022-03-17 NOTE — ASSESSMENT & PLAN NOTE
· Patient also complains of intermittent lightheadedness with exertion  · CT head negative for any acute intracranial abnormality  · PT/OT consult appreciated-home with home health

## 2022-03-17 NOTE — PLAN OF CARE
Problem: PHYSICAL THERAPY ADULT  Goal: Performs mobility at highest level of function for planned discharge setting  See evaluation for individualized goals  Description: Treatment/Interventions: Functional transfer training,LE strengthening/ROM,Elevations,Therapeutic exercise,Endurance training,Patient/family training,Equipment eval/education,Bed mobility,Gait training,Compensatory technique education,Continued evaluation,Spoke to nursing,Family  Equipment Recommended: Hilary Delcid (pt has)       See flowsheet documentation for full assessment, interventions and recommendations  Outcome: Progressing  Note: Prognosis: Good  Problem List: Impaired balance,Decreased mobility,Impaired judgement  Assessment: Pt  needed cues for hand placement 100% with STS transfer  pt  reported no increased discomfort pre and post session  Therapist managed the O2 tank and lines  Pt  ambulated with RW and no LOB or instability ntoed  Noted mild instability with ambulation without AD  Mild L knee buckling noted for ambulation without AD  Recommended patient use RW for long distance ambualtion  No major LOB noted with standing activities with decreased UE support  Son reported he will be able to take patient for OPPT and they prefer OPPT  Son also reported patient does not like to use RW or cane for ambulation  However recommended patient use AD for ambualtion due to his mild unsteadiness during ambulation without AD  Pt  agreeable to it  Barriers to Discharge: None  Barriers to Discharge Comments: [address]     PT Discharge Recommendation: Home with outpatient rehabilitation (and family)          See flowsheet documentation for full assessment

## 2022-03-17 NOTE — OCCUPATIONAL THERAPY NOTE
Occupational Therapy Treatment Note:         03/17/22 1240   OT Last Visit   OT Visit Date 03/17/22   Note Type   Note Type Treatment   Restrictions/Precautions   Weight Bearing Precautions Per Order No   Other Precautions Fall Risk;Pain;Cognitive; Chair Alarm; Bed Alarm;O2;Telemetry;Multiple lines  (5 liters O2  )   Pain Assessment   Pain Assessment Tool 0-10   Pain Score No Pain   Pain Location/Orientation Location: Back;Orientation: Left; Location: Leg;Location: Hip   ADL   Where Assessed Chair   Eating Assistance 5  Supervision/Setup   Eating Deficit Setup   Grooming Assistance 5  Supervision/Setup   Grooming Deficit Setup   Cognition   Overall Cognitive Status WFL   Arousal/Participation Alert; Cooperative   Attention Attends with cues to redirect   Orientation Level Oriented X4   Memory Decreased short term memory;Decreased recall of recent events;Decreased recall of precautions   Following Commands Follows one step commands without difficulty   Comments Pt able to follow translation from spouse regarding CHF packet safety  Assessment   Assessment Pt was seen for skilled OT with focus on review of CHF packet in both [place] and English with Pt and spouse, review of fall prevention and review of current plan of care  The Pt's spouse spoke for the Pt and translated through out tx session  Pt's spouse was able to follow [place] CHF packet with good understanding  Reviewed salt free diet with Pt and spouse to promoted optimal outcome with current health status  The Pt and spouse report having better understanding of need for an emergency response system in their home environment to achieve good safety for both parties  See above levels of A required for all functional tasks  The patient's raw score on the AM-PAC Daily Activity inpatient short form is 19, standardized score is 40 22, less than 39 4  Patients at this level are likely to benefit from discharge to post-acute rehabilitation services      Plan Treatment Interventions ADL retraining;Functional transfer training;UE strengthening/ROM; Endurance training;Cognitive reorientation;Patient/family training   Goal Expiration Date 03/28/22   OT Treatment Day 1   OT Frequency 2-3x/wk   Recommendation   OT Discharge Recommendation Home with home health rehabilitation  (home with home health and cont family support  )   OT - OK to Discharge Yes  (when medically cleared   )   AM-PAC Daily Activity Inpatient   Lower Body Dressing 3   Bathing 3   Toileting 3   Upper Body Dressing 3   Grooming 3   Eating 4   Daily Activity Raw Score 19   Daily Activity Standardized Score (Calc for Raw Score >=11) 40 22   AM-PAC Applied Cognition Inpatient   Following a Speech/Presentation 3   Understanding Ordinary Conversation 4   Taking Medications 3   Remembering Where Things Are Placed or Put Away 3   Remembering List of 4-5 Errands 2   Taking Care of Complicated Tasks 2   Applied Cognition Raw Score 17   Applied Cognition Standardized Score 36 52   Earnestine Tenorio, [address]

## 2022-03-17 NOTE — CASE MANAGEMENT
Case Management Discharge Planning Note    Patient name Noe Bowers  Location [place] [address] 0-* MRN [id number]  : 1935 Date 3/17/2022       Current Admission Date: 3/12/2022  Current Admission Diagnosis:Acute on chronic diastolic congestive heart failure [facility])   Patient Active Problem List    Diagnosis Date Noted    [facility] ) 2022    Lightheadedness 2022    Fever due to infection 2021    Clostridium difficile infection 2021    Proctitis 2021    Rectal bleeding 2021    Hyperlipidemia 2021    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    Altered mental status     Early satiety 2020    Dysphagia 2020    TIA (transient ischemic attack) 2020    Left leg swelling 2019    Acquired renal cyst of left kidney 2019    Preop exam for internal medicine 2019    Abdominal pain 2019    Incarcerated right inguinal hernia 2019    Acute on chronic diastolic congestive heart failure ([facility] ) 2019    Leukocytosis 2018    Thrombocytopenia ([facility] ) 2018    Hypochloremia 2018    Aneurysm, aorta, thoracic ([facility] )     Thoracic aortic aneurysm without rupture ([facility] ) 2018    Edema of both legs 2018    Snoring 2018    Varicose veins of both lower extremities with pain 2018    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HCC) 2018    Chronic respiratory failure with hypoxia ([facility] ) 2018    Accelerated essential hypertension 2018    COPD (chronic obstructive pulmonary disease) ([facility] ) 2018    Descending aortic aneurysm ([facility] ) 2018    History of DVT (deep vein thrombosis) 2018    Benign essential hypertension 2017    Thoracic aortic aneurysm ([facility] ) 2017      LOS (days): 5  Geometric Mean LOS (GMLOS) (days): 3 80  Days to GMLOS:-0 9     OBJECTIVE:  Risk of Unplanned Readmission Score: 16         Current admission status: Inpatient   Preferred Pharmacy:   [id number] [facility]  [address] [place] [address]  [address] [facility] [address]  Phone: [phone] Fax: [phone] 319 [address]  [address] [facility] [address]  [address] [address]  Phone: [phone] Fax: [phone]    Primary Care Provider: Shahla Coyne MD    [facility]  Secondary Insurance: [id number]    DISCHARGE DETAILS:    Discharge planning discussed with[de-identified] patient/spouse  Freedom of Choice: Yes  Comments - Freedom of Choice: Pt agreeable to  VNA   D/C today     Were Treatment Team discharge recommendations reviewed with patient/caregiver?: Yes  Did patient/caregiver verbalize understanding of patient care needs?: Yes  Were patient/caregiver advised of the risks associated with not following Treatment Team discharge recommendations?: Yes         [address] [place] [address]         Is the patient interested in [facility] at discharge?: Yes  [address] requested[de-identified] [address] Name[de-identified] [address] [place] [address] Provider[de-identified] PCP  Home Health Services Needed[de-identified] Evaluate Functional Status and Safety,Heart Failure Management,Strengthening/Theraputic Exercises to Improve Function,Gait/ADL Training  Oxygen LPM Ordered (if applicable based on home health services needed):: 2 LPM  Homebound Criteria Met[de-identified] Requires the Assistance of Another Person for Safe Ambulation or to Leave the Home,Uses an Assist Device (i e  cane, walker, etc)  Supporting Clincal Findings[de-identified] Dyspnea with Exertion,Fatigues Easliy in Short Distances,Limited Endurance,Requires Oxygen      Treatment Team Recommendation: Home with [employer]  Discharge Destination Plan[de-identified] Home with [employer]         IMM Given (Date):: 03/17/22  IMM Given to[de-identified] Patient (read to patient and spouse, spouse signed, copy left in room)              Called patient PCP Dr Tony Cody at [phone]   To schedule PCP follow up appointment for patient with [facility]  The office reports that they will have nurse look at 's schedule and call the patient with an appointment date and time

## 2022-03-17 NOTE — DISCHARGE SUMMARY
[address]  Discharge- Merlene Lanza [birth year], 82 y o  male MRN: [id number]  Unit/Bed#: E4 -01 Encounter: [id number]  Primary Care Provider: Luli Bobo MD   Date and time admitted to hospital: 3/12/2022  3:31 PM    * Acute on chronic diastolic congestive heart failure ([facility] )  Assessment & Plan  Wt Readings from Last 3 Encounters:   03/17/22 62 2 kg (137 lb 2 oz)   11/09/21 67 kg (147 lb 12 8 oz)   08/11/21 64 kg (141 lb 1 5 oz)     · This is an 65-year-old male with history of COPD on chronic oxygen, CHF, hypertension presenting with dyspnea   · BNP 4000  · CXR with mild vascular congestion  · Echocardiogram with preserved ejection fraction  · Required several days of IV diuresis  · Discharged on Lasix 10 mg daily  · Outpatient BMP in 1 week  · Outpatient follow-up with Cardiology      BELLA (acute kidney injury) ([facility] )  Assessment & Plan  Creatinine baseline 0 8-1 1  Creatinine is high as 1 44 in setting of diuresis  Resolved with gentle IV fluid hydration  Follow-up BMP in 1 week    Lightheadedness  Assessment & Plan  · Patient also complains of intermittent lightheadedness with exertion  · CT head negative for any acute intracranial abnormality  · PT/OT consult appreciated-home with home health    Chronic respiratory failure with hypoxia (HCC)  Assessment & Plan  · History of chronic respiratory failure maintained outpatient on 2 L nasal cannula for COPD  · At home oxygen requirement  · Continue to titrate oxygen to maintain saturations greater than 88%  · Continue home nebs p r n   Wheezing    Thoracic aortic aneurysm [facility])  Assessment & Plan  · Status post TEVAR in 2018    Benign essential hypertension  Assessment & Plan  · Continue Lopressor, Norvasc  · Hold losartan, hydrochlorothiazide on discharge      Discharging Physician / Practitioner: Ryan Nguyen DO  PCP: Luli Bobo MD  Admission Date:   Admission Orders (From admission, onward) Ordered        03/12/22 1827  INPATIENT ADMISSION  Once                      Discharge Date: 03/17/22    Medical Problems             Resolved Problems  Date Reviewed: 3/13/2022    None                Consultations During Hospital Stay:  Cardiology    Procedures Performed:  No inpatient procedures    Significant Findings / Test Results:     XR chest portable    Result Date: 3/14/2022  Impression: Mild/moderate pulmonary vascular congestion appears stable  Workstation performed: [id number]     X-ray chest 1 view portable    Result Date: 3/13/2022  Impression: Mild vascular congestion, correlate for mild CHF  Workstation performed: [id number]     CT head without contrast    Result Date: 3/12/2022  Impression: No acute intracranial abnormality  Microangiopathic changes  Workstation performed: [id number]     Test Results Pending at Discharge (will require follow up): None     Outpatient Tests Requested:  BMP    Reason for Admission:  Shortness of breath    Hospital Course:     Ruben Moscoso is a 82 y o  male With past medical history of COPD, diastolic CHF, hypertension, chronic hypoxic respiratory failure on 2 L, CKD who presents the hospital with shortness of breath  Patient was found to be in acute on chronic diastolic heart failure evidence by mild vascular congestion, elevated proBNP  Patient received several days of IV diuresis  Diuretics were intermittently held due to BELLA  Cardiology recommends initiating 10 mg Lasix on discharge  Please see above list of diagnoses and related plan for additional information  Condition at Discharge: stable     Discharge Day Visit / Exam:     Subjective:    Patient seen and evaluated at bedside  He feels well  No complaints      Vitals: Blood Pressure: 152/68 (03/17/22 1100)  Pulse: 61 (03/17/22 1100)  Temperature: 98 1 °F (36 7 °C) (03/17/22 1100)  Temp Source: Temporal (03/17/22 1100)  Respirations: 18 (03/17/22 1100)  Height: 5' 5" (165 1 cm) (03/14/22 0900)  Weight - Scale: 62 2 kg (137 lb 2 oz) (03/17/22 0534)  SpO2: 93 % (03/17/22 1100)  Exam:   Physical Exam  Vitals reviewed  Constitutional:       General: He is not in acute distress  Appearance: He is well-developed  He is not ill-appearing, toxic-appearing or diaphoretic  HENT:      Head: Normocephalic and atraumatic  Mouth/Throat:      Mouth: Mucous membranes are moist       Pharynx: No oropharyngeal exudate  Eyes:      General: No scleral icterus  Extraocular Movements: Extraocular movements intact  Conjunctiva/sclera: Conjunctivae normal    Cardiovascular:      Rate and Rhythm: Normal rate and regular rhythm  Heart sounds: Normal heart sounds  Pulmonary:      Effort: Pulmonary effort is normal  No respiratory distress  Breath sounds: Normal breath sounds  No wheezing or rales  Abdominal:      General: There is no distension  Palpations: Abdomen is soft  Tenderness: There is no abdominal tenderness  There is no guarding or rebound  Musculoskeletal:         General: No swelling, tenderness or deformity  Skin:     General: Skin is warm and dry  Neurological:      General: No focal deficit present  Mental Status: He is alert  Mental status is at baseline  Psychiatric:         Mood and Affect: Mood normal          Behavior: Behavior normal          Thought Content: Thought content normal          Judgment: Judgment normal            Discharge instructions/Information to patient and family:   See after visit summary for information provided to patient and family  Provisions for Follow-Up Care:  See after visit summary for information related to follow-up care and any pertinent home health orders  Disposition:     Home     Discharge Statement:  I spent 60 minutes discharging the patient  This time was spent on the day of discharge  I had direct contact with the patient on the day of discharge   Greater than 50% of the total time was spent examining patient, answering all patient questions, arranging and discussing plan of care with patient as well as directly providing post-discharge instructions  Additional time then spent on discharge activities  Discharge Medications:  See after visit summary for reconciled discharge medications provided to patient and family        ** Please Note: This note has been constructed using a voice recognition system **

## 2022-03-17 NOTE — DISCHARGE INSTRUCTIONS
Dear Gio Robledo,     It was our pleasure to care for you here at [facility]  It is our hope that we were always able to exceed the expected standards for your care during your stay  You were hospitalized due to failure  You were cared for on the 4th floor under the service of Harlan Marshall DO with the [facility] who covers for your primary care physician (PCP), Meagn Huerta MD, while you were hospitalized  If you have any questions or concerns related to this hospitalization, you may contact us at [phone]  For follow up as well as medication refills, we recommend that you follow up with your primary care physician  A registered nurse will reach out to you by phone within a few days after your discharge to answer any additional questions that you may have after going home  However, at this time we provide for you here, the most important instructions / recommendations at discharge:     · Notable Medication Adjustments -   · Discontinue losartan, hydrochlorothiazide  · Start Lasix 10 mg daily  · Testing Required after Discharge -   · Check kidney function test in 1 week  · Important follow up information -   · Outpatient follow-up with PCP within 1 week, cardiology within 4 weeks  · Other Instructions -   · Please return to the hospital for worsening symptoms of shortness of breath  · Please review this entire after visit summary as additional general instructions including medication list, appointments, activity, diet, any pertinent wound care, and other additional recommendations from your care team that may be provided for you        Sincerely,     Harlan Marshall DO

## 2022-03-17 NOTE — PLAN OF CARE
Problem: Potential for Falls  Goal: Patient will remain free of falls  Description: INTERVENTIONS:  - Educate patient/family on patient safety including physical limitations  - Instruct patient to call for assistance with activity   - Consult OT/PT to assist with strengthening/mobility   - Keep Call bell within reach  - Keep bed low and locked with side rails adjusted as appropriate  - Keep care items and personal belongings within reach  - Initiate and maintain comfort rounds  - Make Fall Risk Sign visible to staff  - Offer Toileting every 2 Hours, in advance of need  - Initiate/Maintain bed alarm  - Obtain necessary fall risk management equipment: bed alarm  - Apply yellow socks and bracelet for high fall risk patients  - Consider moving patient to room near nurses station  Outcome: Completed     Problem: PAIN - ADULT  Goal: Verbalizes/displays adequate comfort level or baseline comfort level  Description: Interventions:  - Encourage patient to monitor pain and request assistance  - Assess pain using appropriate pain scale  - Administer analgesics based on type and severity of pain and evaluate response  - Implement non-pharmacological measures as appropriate and evaluate response  - Consider cultural and social influences on pain and pain management  - Notify physician/advanced practitioner if interventions unsuccessful or patient reports new pain  Outcome: Completed     Problem: INFECTION - ADULT  Goal: Absence or prevention of progression during hospitalization  Description: INTERVENTIONS:  - Assess and monitor for signs and symptoms of infection  - Monitor lab/diagnostic results  - Monitor all insertion sites, i e  indwelling lines, tubes, and drains  - Monitor endotracheal if appropriate and nasal secretions for changes in amount and color  - [place] appropriate cooling/warming therapies per order  - Administer medications as ordered  - Instruct and encourage patient and family to use good hand hygiene technique  - Identify and instruct in appropriate isolation precautions for identified infection/condition  Outcome: Completed     Problem: SAFETY ADULT  Goal: Patient will remain free of falls  Description: INTERVENTIONS:  - Educate patient/family on patient safety including physical limitations  - Instruct patient to call for assistance with activity   - Consult OT/PT to assist with strengthening/mobility   - Keep Call bell within reach  - Keep bed low and locked with side rails adjusted as appropriate  - Keep care items and personal belongings within reach  - Initiate and maintain comfort rounds  - Make Fall Risk Sign visible to staff  - Offer Toileting every 2 Hours, in advance of need  - Initiate/Maintain bed alarm  - Obtain necessary fall risk management equipment: bed alarm  - Apply yellow socks and bracelet for high fall risk patients  - Consider moving patient to room near nurses station  Outcome: Completed  Goal: Maintain or return to baseline ADL function  Description: INTERVENTIONS:  - Educate patient/family on patient safety including physical limitations  - Instruct patient to call for assistance with activity   - Consult OT/PT to assist with strengthening/mobility   - Keep Call bell within reach  - Keep bed low and locked with side rails adjusted as appropriate  - Keep care items and personal belongings within reach  - Initiate and maintain comfort rounds  - Make Fall Risk Sign visible to staff  - Offer Toileting every 2 Hours, in advance of need  - Initiate/Maintain bed alarm  - Obtain necessary fall risk management equipment: bed alarm  - Apply yellow socks and bracelet for high fall risk patients  - Consider moving patient to room near nurses station  Outcome: Completed  Goal: Maintains/Returns to pre admission functional level  Description: INTERVENTIONS:  - Perform BMAT or MOVE assessment daily    - Set and communicate daily mobility goal to care team and patient/family/caregiver     - Collaborate with rehabilitation services on mobility goals if consulted  - Perform Range of Motion  times a day  - Reposition patient every  hours  - Dangle patient  times a day  - Stand patient  times a day  - Ambulate patient  times a day  - Out of bed to chair  times a day   - Out of bed for meals  times a day  - Out of bed for toileting  - Record patient progress and toleration of activity level   Outcome: Completed     Problem: DISCHARGE PLANNING  Goal: Discharge to home or other facility with appropriate resources  Description: INTERVENTIONS:  - Identify barriers to discharge w/patient and caregiver  - Arrange for needed discharge resources and transportation as appropriate  - Identify discharge learning needs (meds, wound care, etc )  - Arrange for interpretive services to assist at discharge as needed  - Refer to Case Management Department for coordinating discharge planning if the patient needs post-hospital services based on physician/advanced practitioner order or complex needs related to functional status, cognitive ability, or social support system  Outcome: Completed     Problem: Knowledge Deficit  Goal: Patient/family/caregiver demonstrates understanding of disease process, treatment plan, medications, and discharge instructions  Description: Complete learning assessment and assess knowledge base    Interventions:  - Provide teaching at level of understanding  - Provide teaching via preferred learning methods  Outcome: Completed     Problem: MOBILITY - ADULT  Goal: Maintain or return to baseline ADL function  Description: INTERVENTIONS:  - Educate patient/family on patient safety including physical limitations  - Instruct patient to call for assistance with activity   - Consult OT/PT to assist with strengthening/mobility   - Keep Call bell within reach  - Keep bed low and locked with side rails adjusted as appropriate  - Keep care items and personal belongings within reach  - Initiate and maintain comfort rounds  - Make Fall Risk Sign visible to staff  - Offer Toileting every 2 Hours, in advance of need  - Initiate/Maintain bed alarm  - Obtain necessary fall risk management equipment: bed alarm  - Apply yellow socks and bracelet for high fall risk patients  - Consider moving patient to room near nurses station  Outcome: Completed  Goal: Maintains/Returns to pre admission functional level  Description: INTERVENTIONS:  - Perform BMAT or MOVE assessment daily    - Set and communicate daily mobility goal to care team and patient/family/caregiver  - Collaborate with rehabilitation services on mobility goals if consulted  - Perform Range of Motion  times a day  - Reposition patient every  hours    - Dangle patient  times a day  - Stand patient  times a day  - Ambulate patient  times a day  - Out of bed to chair  times a day   - Out of bed for meals times a day  - Out of bed for toileting  - Record patient progress and toleration of activity level   Outcome: Completed     Problem: Prexisting or High Potential for Compromised Skin Integrity  Goal: Skin integrity is maintained or improved  Description: INTERVENTIONS:  - Identify patients at risk for skin breakdown  - Assess and monitor skin integrity  - Assess and monitor nutrition and hydration status  - Monitor labs   - Assess for incontinence   - Turn and reposition patient  - Assist with mobility/ambulation  - Relieve pressure over bony prominences  - Avoid friction and shearing  - Provide appropriate hygiene as needed including keeping skin clean and dry  - Evaluate need for skin moisturizer/barrier cream  - Collaborate with interdisciplinary team   - Patient/family teaching  - Consider wound care consult   Outcome: Completed     Problem: CARDIOVASCULAR - ADULT  Goal: Maintains optimal cardiac output and hemodynamic stability  Description: INTERVENTIONS:  - Monitor I/O, vital signs and rhythm  - Monitor for S/S and trends of decreased cardiac output  - Administer and titrate ordered vasoactive medications to optimize hemodynamic stability  - Assess quality of pulses, skin color and temperature  - Assess for signs of decreased coronary artery perfusion  - Instruct patient to report change in severity of symptoms  Outcome: Completed  Goal: Absence of cardiac dysrhythmias or at baseline rhythm  Description: INTERVENTIONS:  - Continuous cardiac monitoring, vital signs, obtain 12 lead EKG if ordered  - Administer antiarrhythmic and heart rate control medications as ordered  - Monitor electrolytes and administer replacement therapy as ordered  Outcome: Completed     Problem: RESPIRATORY - ADULT  Goal: Achieves optimal ventilation and oxygenation  Description: INTERVENTIONS:  - Assess for changes in respiratory status  - Assess for changes in mentation and behavior  - Position to facilitate oxygenation and minimize respiratory effort  - Oxygen administered by appropriate delivery if ordered  - Initiate smoking cessation education as indicated  - Encourage broncho-pulmonary hygiene including cough, deep breathe, Incentive Spirometry  - Assess the need for suctioning and aspirate as needed  - Assess and instruct to report SOB or any respiratory difficulty  - Respiratory Therapy support as indicated  Outcome: Completed

## 2022-03-17 NOTE — PLAN OF CARE
Patient notified Problem: Potential for Falls  Goal: Patient will remain free of falls  Description: INTERVENTIONS:  - Educate patient/family on patient safety including physical limitations  - Instruct patient to call for assistance with activity   - Consult OT/PT to assist with strengthening/mobility   - Keep Call bell within reach  - Keep bed low and locked with side rails adjusted as appropriate  - Keep care items and personal belongings within reach  - Initiate and maintain comfort rounds  - Make Fall Risk Sign visible to staff  - Offer Toileting every 2 Hours, in advance of need  - Initiate/Maintain bed alarm  - Obtain necessary fall risk management equipment: bed alarm  - Apply yellow socks and bracelet for high fall risk patients  - Consider moving patient to room near nurses station  Outcome: Progressing     Problem: PAIN - ADULT  Goal: Verbalizes/displays adequate comfort level or baseline comfort level  Description: Interventions:  - Encourage patient to monitor pain and request assistance  - Assess pain using appropriate pain scale  - Administer analgesics based on type and severity of pain and evaluate response  - Implement non-pharmacological measures as appropriate and evaluate response  - Consider cultural and social influences on pain and pain management  - Notify physician/advanced practitioner if interventions unsuccessful or patient reports new pain  Outcome: Progressing     Problem: PAIN - ADULT  Goal: Verbalizes/displays adequate comfort level or baseline comfort level  Description: Interventions:  - Encourage patient to monitor pain and request assistance  - Assess pain using appropriate pain scale  - Administer analgesics based on type and severity of pain and evaluate response  - Implement non-pharmacological measures as appropriate and evaluate response  - Consider cultural and social influences on pain and pain management  - Notify physician/advanced practitioner if interventions unsuccessful or patient reports new pain  Outcome: Progressing     Problem: INFECTION - ADULT  Goal: Absence or prevention of progression during hospitalization  Description: INTERVENTIONS:  - Assess and monitor for signs and symptoms of infection  - Monitor lab/diagnostic results  - Monitor all insertion sites, i e  indwelling lines, tubes, and drains  - Monitor endotracheal if appropriate and nasal secretions for changes in amount and color  - Raymond appropriate cooling/warming therapies per order  - Administer medications as ordered  - Instruct and encourage patient and family to use good hand hygiene technique  - Identify and instruct in appropriate isolation precautions for identified infection/condition  Outcome: Progressing     Problem: SAFETY ADULT  Goal: Patient will remain free of falls  Description: INTERVENTIONS:  - Educate patient/family on patient safety including physical limitations  - Instruct patient to call for assistance with activity   - Consult OT/PT to assist with strengthening/mobility   - Keep Call bell within reach  - Keep bed low and locked with side rails adjusted as appropriate  - Keep care items and personal belongings within reach  - Initiate and maintain comfort rounds  - Make Fall Risk Sign visible to staff  - Offer Toileting every 2 Hours, in advance of need  - Initiate/Maintain bed alarm  - Obtain necessary fall risk management equipment: bed alarm  - Apply yellow socks and bracelet for high fall risk patients  - Consider moving patient to room near nurses station  Outcome: Progressing  Goal: Maintain or return to baseline ADL function  Description: INTERVENTIONS:  - Educate patient/family on patient safety including physical limitations  - Instruct patient to call for assistance with activity   - Consult OT/PT to assist with strengthening/mobility   - Keep Call bell within reach  - Keep bed low and locked with side rails adjusted as appropriate  - Keep care items and personal belongings within reach  - Initiate and maintain comfort rounds  - Make Fall Risk Sign visible to staff  - Offer Toileting every 2 Hours, in advance of need  - Initiate/Maintain bed alarm  - Obtain necessary fall risk management equipment: bed alarm  - Apply yellow socks and bracelet for high fall risk patients  - Consider moving patient to room near nurses station  Outcome: Progressing  Goal: Maintains/Returns to pre admission functional level  Description: INTERVENTIONS:  - Perform BMAT or MOVE assessment daily    - Set and communicate daily mobility goal to care team and patient/family/caregiver  - Collaborate with rehabilitation services on mobility goals if consulted  - Perform Range of Motion 3 times a day  - Reposition patient every 3 hours    - Dangle patient 3 times a day  - Stand patient 3 times a day  - Ambulate patient 3 times a day  - Out of bed to chair 3 times a day   - Out of bed for meals 3 times a day  - Out of bed for toileting  - Record patient progress and toleration of activity level   Outcome: Progressing     Problem: DISCHARGE PLANNING  Goal: Discharge to home or other facility with appropriate resources  Description: INTERVENTIONS:  - Identify barriers to discharge w/patient and caregiver  - Arrange for needed discharge resources and transportation as appropriate  - Identify discharge learning needs (meds, wound care, etc )  - Arrange for interpretive services to assist at discharge as needed  - Refer to Case Management Department for coordinating discharge planning if the patient needs post-hospital services based on physician/advanced practitioner order or complex needs related to functional status, cognitive ability, or social support system  Outcome: Progressing     Problem: Knowledge Deficit  Goal: Patient/family/caregiver demonstrates understanding of disease process, treatment plan, medications, and discharge instructions  Description: Complete learning assessment and assess knowledge base   Interventions:  - Provide teaching at level of understanding  - Provide teaching via preferred learning methods  Outcome: Progressing     Problem: MOBILITY - ADULT  Goal: Maintain or return to baseline ADL function  Description: INTERVENTIONS:  - Educate patient/family on patient safety including physical limitations  - Instruct patient to call for assistance with activity   - Consult OT/PT to assist with strengthening/mobility   - Keep Call bell within reach  - Keep bed low and locked with side rails adjusted as appropriate  - Keep care items and personal belongings within reach  - Initiate and maintain comfort rounds  - Make Fall Risk Sign visible to staff  - Offer Toileting every 2 Hours, in advance of need  - Initiate/Maintain bed alarm  - Obtain necessary fall risk management equipment: bed alarm  - Apply yellow socks and bracelet for high fall risk patients  - Consider moving patient to room near nurses station  Outcome: Progressing  Goal: Maintains/Returns to pre admission functional level  Description: INTERVENTIONS:  - Perform BMAT or MOVE assessment daily    - Set and communicate daily mobility goal to care team and patient/family/caregiver  - Collaborate with rehabilitation services on mobility goals if consulted  - Perform Range of Motion 3 times a day  - Reposition patient every 3 hours    - Dangle patient 3 times a day  - Stand patient 3 times a day  - Ambulate patient 3 times a day  - Out of bed to chair 3 times a day   - Out of bed for meals 3 times a day  - Out of bed for toileting  - Record patient progress and toleration of activity level   Outcome: Progressing

## 2022-03-17 NOTE — PHYSICAL THERAPY NOTE
Physical Therapy Treatment Note     03/17/22 1132   PT Last Visit   PT Visit Date 03/17/22   Note Type   Note Type Treatment   Pain Assessment   Pain Assessment Tool 0-10   Pain Score No Pain   Restrictions/Precautions   Weight Bearing Precautions Per Order No   Other Precautions Chair Alarm;O2;Fall Risk  (2L O2)   General   Chart Reviewed Yes   Family/Caregiver Present Yes   Subjective   Subjective No pain reported  Seated in chair upon entry  Wife present t/o session  Wife translated during session  Talked to son on the phone  Transfers   Sit to Stand 5  Supervision   Additional items Assist x 1;Verbal cues;Armrests   Stand to Sit 5  Supervision   Additional items Assist x 1;Verbal cues;Armrests   Stand pivot 5  Supervision   Additional items Assist x 1  (with RW)   Ambulation/Elevation   Gait pattern Improper Weight shift;Decreased foot clearance; Excessively slow;Decreased heel strike   Gait Assistance 5  Supervision   Additional items Assist x 1;Verbal cues   Assistive Device Rolling walker;None   Distance 120ft x 2    Stair Management Assistance 5  Supervision   Additional items Assist x 1   Stair Management Technique One rail L;Alternating pattern; Foreward;Reciprocal   Number of Stairs 14   Balance   Static Sitting Good   Dynamic Sitting Fair +   Static Standing Fair +   Dynamic Standing Fair -   Ambulatory Fair -   Endurance Deficit   Endurance Deficit No   Activity Tolerance   Activity Tolerance Patient tolerated treatment well   Nurse Made Aware yes   Exercises   Knee AROM Long Arc Quad Sitting;Bilateral;AROM;20 reps   Balance training  standing alt  marches, ham curls, HR/TR, mini squats with just finger tip support   Assessment   Prognosis Good   Problem List Impaired balance;Decreased mobility; Impaired judgement   Assessment Pt  needed cues for hand placement 100% with STS transfer  pt  reported no increased discomfort pre and post session  Therapist managed the O2 tank and lines   Pt  ambulated with RW and no LOB or instability ntoed  Noted mild instability with ambulation without AD  Mild L knee buckling noted for ambulation without AD  Recommended patient use RW for long distance ambualtion  No major LOB noted with standing activities with decreased UE support  Son reported he will be able to take patient for OPPT and they prefer OPPT  Son also reported patient does not like to use RW or cane for ambulation  However recommended patient use AD for ambualtion due to his mild unsteadiness during ambulation without AD  Pt  agreeable to it  Barriers to Discharge None   Goals   Patient Goals None reported   STG Expiration Date 03/23/22   PT Treatment Day 1   Plan   Treatment/Interventions Functional transfer training;LE strengthening/ROM; Elevations; Therapeutic exercise;Patient/family training;Equipment eval/education;Gait training;Spoke to nursing;Family   Progress Progressing toward goals   PT Frequency 3-5x/wk   Recommendation   PT Discharge Recommendation Home with outpatient rehabilitation  (and family)   Equipment Recommended Walker   AM-PAC Basic Mobility Inpatient   Turning in Bed Without Bedrails 4   Lying on Back to Sitting on Edge of Flat Bed 4   Moving Bed to Chair 4   Standing Up From Chair 4   Walk in Room 4   Climb 3-5 Stairs 4   Basic Mobility Inpatient Raw Score 24   Basic Mobility Standardized Score 57 68   Highest Level Of Mobility   JH-HLM Goal 8: Walk 250 feet or more   JH-HLM Highest Level of Mobility 8: Walk 250 feet ot more   JH-HLM Goal Achieved Yes   End of Consult   Patient Position at End of Consult Bedside chair; All needs within reach         Hermelinda Morgan PTA    An AM-PAC basic mobility standardized score less than 42 9 suggest the patient may benefit from discharge to post-acute rehab services

## 2022-03-24 ENCOUNTER — TELEPHONE (OUTPATIENT)
Dept: CARDIOLOGY CLINIC | Facility: CLINIC | Age: 87
End: 2022-03-24

## 2022-03-24 NOTE — TELEPHONE ENCOUNTER
Son called to let us know pt did not take Lasix 10 mg today  Pt is sick with vomiting, diarrhea and stomach pain  Advised to try and have pt eat dry toast and tea If he can hold it down  Verbally understood  Will call son tomorrow to see how pt is feeling

## 2022-03-25 NOTE — TELEPHONE ENCOUNTER
Pt is feeling better today and is taking his lasix  Had labs drawn yesterday, but see no results  Son will call [facility] to see what happened to the specimen

## 2022-04-23 ENCOUNTER — APPOINTMENT (EMERGENCY)
Dept: RADIOLOGY | Facility: HOSPITAL | Age: 87
DRG: 193 | End: 2022-04-23
Payer: MEDICARE

## 2022-04-23 ENCOUNTER — HOSPITAL ENCOUNTER (INPATIENT)
Facility: HOSPITAL | Age: 87
LOS: 2 days | Discharge: HOME HEALTH | DRG: 193 | End: 2022-04-25
Attending: EMERGENCY MEDICINE | Admitting: INTERNAL MEDICINE
Payer: MEDICARE

## 2022-04-23 DIAGNOSIS — J18.9 PNEUMONIA: ICD-10-CM

## 2022-04-23 DIAGNOSIS — I50.33 ACUTE ON CHRONIC DIASTOLIC CONGESTIVE HEART FAILURE (HCC): ICD-10-CM

## 2022-04-23 DIAGNOSIS — R50.9 FEVER: ICD-10-CM

## 2022-04-23 DIAGNOSIS — J96.20: Primary | ICD-10-CM

## 2022-04-23 PROBLEM — I50.9 CHF (CONGESTIVE HEART FAILURE) (HCC): Status: ACTIVE | Noted: 2022-04-23

## 2022-04-23 LAB
2HR DELTA HS TROPONIN: 3 NG/L
4HR DELTA HS TROPONIN: 4 NG/L
ALBUMIN SERPL BCP-MCNC: 3.2 G/DL (ref 3.5–5)
ALP SERPL-CCNC: 76 U/L (ref 46–116)
ALT SERPL W P-5'-P-CCNC: 19 U/L (ref 12–78)
ANION GAP SERPL CALCULATED.3IONS-SCNC: 3 MMOL/L (ref 4–13)
APTT PPP: 30 SECONDS (ref 23–37)
AST SERPL W P-5'-P-CCNC: 22 U/L (ref 5–45)
ATRIAL RATE: 56 BPM
ATRIAL RATE: 63 BPM
BASOPHILS # BLD AUTO: 0.05 THOUSANDS/ΜL (ref 0–0.1)
BASOPHILS NFR BLD AUTO: 1 % (ref 0–1)
BILIRUB SERPL-MCNC: 0.74 MG/DL (ref 0.2–1)
BILIRUB UR QL STRIP: NEGATIVE
BUN SERPL-MCNC: 19 MG/DL (ref 5–25)
CALCIUM ALBUM COR SERPL-MCNC: 9.4 MG/DL (ref 8.3–10.1)
CALCIUM SERPL-MCNC: 8.8 MG/DL (ref 8.3–10.1)
CARDIAC TROPONIN I PNL SERPL HS: 11 NG/L
CARDIAC TROPONIN I PNL SERPL HS: 14 NG/L
CARDIAC TROPONIN I PNL SERPL HS: 15 NG/L
CHLORIDE SERPL-SCNC: 104 MMOL/L (ref 100–108)
CLARITY UR: CLEAR
CO2 SERPL-SCNC: 38 MMOL/L (ref 21–32)
COLOR UR: YELLOW
CREAT SERPL-MCNC: 0.99 MG/DL (ref 0.6–1.3)
EOSINOPHIL # BLD AUTO: 0.65 THOUSAND/ΜL (ref 0–0.61)
EOSINOPHIL NFR BLD AUTO: 8 % (ref 0–6)
ERYTHROCYTE [DISTWIDTH] IN BLOOD BY AUTOMATED COUNT: 14.7 % (ref 11.6–15.1)
FLUAV RNA RESP QL NAA+PROBE: NEGATIVE
FLUBV RNA RESP QL NAA+PROBE: NEGATIVE
GFR SERPL CREATININE-BSD FRML MDRD: 69 ML/MIN/1.73SQ M
GLUCOSE SERPL-MCNC: 117 MG/DL (ref 65–140)
GLUCOSE UR STRIP-MCNC: NEGATIVE MG/DL
HCT VFR BLD AUTO: 43.9 % (ref 36.5–49.3)
HGB BLD-MCNC: 12.5 G/DL (ref 12–17)
HGB UR QL STRIP.AUTO: NEGATIVE
IMM GRANULOCYTES # BLD AUTO: 0.03 THOUSAND/UL (ref 0–0.2)
IMM GRANULOCYTES NFR BLD AUTO: 0 % (ref 0–2)
INR PPP: 1.1 (ref 0.84–1.19)
KETONES UR STRIP-MCNC: NEGATIVE MG/DL
LACTATE SERPL-SCNC: 1.1 MMOL/L (ref 0.5–2)
LEUKOCYTE ESTERASE UR QL STRIP: NEGATIVE
LYMPHOCYTES # BLD AUTO: 0.87 THOUSANDS/ΜL (ref 0.6–4.47)
LYMPHOCYTES NFR BLD AUTO: 11 % (ref 14–44)
MCH RBC QN AUTO: 29.8 PG (ref 26.8–34.3)
MCHC RBC AUTO-ENTMCNC: 28.5 G/DL (ref 31.4–37.4)
MCV RBC AUTO: 105 FL (ref 82–98)
MONOCYTES # BLD AUTO: 0.97 THOUSAND/ΜL (ref 0.17–1.22)
MONOCYTES NFR BLD AUTO: 12 % (ref 4–12)
NEUTROPHILS # BLD AUTO: 5.61 THOUSANDS/ΜL (ref 1.85–7.62)
NEUTS SEG NFR BLD AUTO: 68 % (ref 43–75)
NITRITE UR QL STRIP: NEGATIVE
NRBC BLD AUTO-RTO: 0 /100 WBCS
NT-PROBNP SERPL-MCNC: 992 PG/ML
P AXIS: 70 DEGREES
P AXIS: 74 DEGREES
PH UR STRIP.AUTO: 7 [PH]
PLATELET # BLD AUTO: 135 THOUSANDS/UL (ref 149–390)
PMV BLD AUTO: 8.9 FL (ref 8.9–12.7)
POTASSIUM SERPL-SCNC: 4.2 MMOL/L (ref 3.5–5.3)
PR INTERVAL: 160 MS
PR INTERVAL: 168 MS
PROCALCITONIN SERPL-MCNC: 0.05 NG/ML
PROT SERPL-MCNC: 7.5 G/DL (ref 6.4–8.2)
PROT UR STRIP-MCNC: NEGATIVE MG/DL
PROTHROMBIN TIME: 13.9 SECONDS (ref 11.6–14.5)
QRS AXIS: 88 DEGREES
QRS AXIS: 92 DEGREES
QRSD INTERVAL: 78 MS
QRSD INTERVAL: 82 MS
QT INTERVAL: 400 MS
QT INTERVAL: 446 MS
QTC INTERVAL: 409 MS
QTC INTERVAL: 430 MS
RBC # BLD AUTO: 4.19 MILLION/UL (ref 3.88–5.62)
RSV RNA RESP QL NAA+PROBE: NEGATIVE
SARS-COV-2 RNA RESP QL NAA+PROBE: NEGATIVE
SODIUM SERPL-SCNC: 145 MMOL/L (ref 136–145)
SP GR UR STRIP.AUTO: 1.01 (ref 1–1.03)
T WAVE AXIS: 61 DEGREES
T WAVE AXIS: 70 DEGREES
UROBILINOGEN UR QL STRIP.AUTO: 0.2 E.U./DL
VENTRICULAR RATE: 56 BPM
VENTRICULAR RATE: 63 BPM
WBC # BLD AUTO: 8.18 THOUSAND/UL (ref 4.31–10.16)

## 2022-04-23 PROCEDURE — 71045 X-RAY EXAM CHEST 1 VIEW: CPT

## 2022-04-23 PROCEDURE — 83605 ASSAY OF LACTIC ACID: CPT | Performed by: EMERGENCY MEDICINE

## 2022-04-23 PROCEDURE — 85730 THROMBOPLASTIN TIME PARTIAL: CPT | Performed by: EMERGENCY MEDICINE

## 2022-04-23 PROCEDURE — 93010 ELECTROCARDIOGRAM REPORT: CPT | Performed by: INTERNAL MEDICINE

## 2022-04-23 PROCEDURE — 0241U HB NFCT DS VIR RESP RNA 4 TRGT: CPT | Performed by: EMERGENCY MEDICINE

## 2022-04-23 PROCEDURE — 85025 COMPLETE CBC W/AUTO DIFF WBC: CPT | Performed by: EMERGENCY MEDICINE

## 2022-04-23 PROCEDURE — 99285 EMERGENCY DEPT VISIT HI MDM: CPT

## 2022-04-23 PROCEDURE — 84145 PROCALCITONIN (PCT): CPT | Performed by: EMERGENCY MEDICINE

## 2022-04-23 PROCEDURE — 99285 EMERGENCY DEPT VISIT HI MDM: CPT | Performed by: EMERGENCY MEDICINE

## 2022-04-23 PROCEDURE — 81003 URINALYSIS AUTO W/O SCOPE: CPT | Performed by: EMERGENCY MEDICINE

## 2022-04-23 PROCEDURE — 83880 ASSAY OF NATRIURETIC PEPTIDE: CPT | Performed by: EMERGENCY MEDICINE

## 2022-04-23 PROCEDURE — 84484 ASSAY OF TROPONIN QUANT: CPT | Performed by: EMERGENCY MEDICINE

## 2022-04-23 PROCEDURE — 93005 ELECTROCARDIOGRAM TRACING: CPT

## 2022-04-23 PROCEDURE — 96365 THER/PROPH/DIAG IV INF INIT: CPT

## 2022-04-23 PROCEDURE — 87040 BLOOD CULTURE FOR BACTERIA: CPT | Performed by: EMERGENCY MEDICINE

## 2022-04-23 PROCEDURE — 99223 1ST HOSP IP/OBS HIGH 75: CPT | Performed by: PHYSICIAN ASSISTANT

## 2022-04-23 PROCEDURE — 85610 PROTHROMBIN TIME: CPT | Performed by: EMERGENCY MEDICINE

## 2022-04-23 PROCEDURE — 36415 COLL VENOUS BLD VENIPUNCTURE: CPT | Performed by: EMERGENCY MEDICINE

## 2022-04-23 PROCEDURE — 80053 COMPREHEN METABOLIC PANEL: CPT | Performed by: EMERGENCY MEDICINE

## 2022-04-23 RX ORDER — ASPIRIN 81 MG/1
81 TABLET ORAL DAILY
Refills: 0 | Status: DISCONTINUED | OUTPATIENT
Start: 2022-04-23 | End: 2022-04-25 | Stop reason: HOSPADM

## 2022-04-23 RX ORDER — ALBUTEROL SULFATE 2.5 MG/3ML
2.5 SOLUTION RESPIRATORY (INHALATION) EVERY 6 HOURS PRN
Status: DISCONTINUED | OUTPATIENT
Start: 2022-04-23 | End: 2022-04-25 | Stop reason: HOSPADM

## 2022-04-23 RX ORDER — MAGNESIUM HYDROXIDE/ALUMINUM HYDROXICE/SIMETHICONE 120; 1200; 1200 MG/30ML; MG/30ML; MG/30ML
30 SUSPENSION ORAL EVERY 6 HOURS PRN
Status: DISCONTINUED | OUTPATIENT
Start: 2022-04-23 | End: 2022-04-25 | Stop reason: HOSPADM

## 2022-04-23 RX ORDER — ONDANSETRON 2 MG/ML
4 INJECTION INTRAMUSCULAR; INTRAVENOUS EVERY 6 HOURS PRN
Status: DISCONTINUED | OUTPATIENT
Start: 2022-04-23 | End: 2022-04-25 | Stop reason: HOSPADM

## 2022-04-23 RX ORDER — ACETAMINOPHEN 325 MG/1
650 TABLET ORAL EVERY 6 HOURS PRN
Status: DISCONTINUED | OUTPATIENT
Start: 2022-04-23 | End: 2022-04-25 | Stop reason: HOSPADM

## 2022-04-23 RX ORDER — BUDESONIDE AND FORMOTEROL FUMARATE DIHYDRATE 160; 4.5 UG/1; UG/1
2 AEROSOL RESPIRATORY (INHALATION) 2 TIMES DAILY
Status: DISCONTINUED | OUTPATIENT
Start: 2022-04-23 | End: 2022-04-25 | Stop reason: HOSPADM

## 2022-04-23 RX ORDER — METOPROLOL TARTRATE 50 MG/1
50 TABLET, FILM COATED ORAL EVERY 12 HOURS SCHEDULED
Status: DISCONTINUED | OUTPATIENT
Start: 2022-04-23 | End: 2022-04-25 | Stop reason: HOSPADM

## 2022-04-23 RX ORDER — PANTOPRAZOLE SODIUM 40 MG/1
40 TABLET, DELAYED RELEASE ORAL DAILY
Status: DISCONTINUED | OUTPATIENT
Start: 2022-04-23 | End: 2022-04-25 | Stop reason: HOSPADM

## 2022-04-23 RX ORDER — AMLODIPINE BESYLATE 5 MG/1
5 TABLET ORAL DAILY
Status: DISCONTINUED | OUTPATIENT
Start: 2022-04-23 | End: 2022-04-25 | Stop reason: HOSPADM

## 2022-04-23 RX ORDER — LEVOFLOXACIN 5 MG/ML
750 INJECTION, SOLUTION INTRAVENOUS ONCE
Status: COMPLETED | OUTPATIENT
Start: 2022-04-23 | End: 2022-04-23

## 2022-04-23 RX ORDER — FUROSEMIDE 20 MG/1
10 TABLET ORAL DAILY
Status: DISCONTINUED | OUTPATIENT
Start: 2022-04-23 | End: 2022-04-24

## 2022-04-23 RX ORDER — FERROUS SULFATE 325(65) MG
325 TABLET ORAL
Status: DISCONTINUED | OUTPATIENT
Start: 2022-04-24 | End: 2022-04-25 | Stop reason: HOSPADM

## 2022-04-23 RX ORDER — GUAIFENESIN 600 MG
600 TABLET, EXTENDED RELEASE 12 HR ORAL 2 TIMES DAILY
Status: DISCONTINUED | OUTPATIENT
Start: 2022-04-23 | End: 2022-04-25 | Stop reason: HOSPADM

## 2022-04-23 RX ORDER — ATORVASTATIN CALCIUM 10 MG/1
10 TABLET, FILM COATED ORAL DAILY
Status: DISCONTINUED | OUTPATIENT
Start: 2022-04-23 | End: 2022-04-25 | Stop reason: HOSPADM

## 2022-04-23 RX ORDER — SODIUM CHLORIDE 9 MG/ML
3 INJECTION INTRAVENOUS
Status: DISCONTINUED | OUTPATIENT
Start: 2022-04-23 | End: 2022-04-25 | Stop reason: HOSPADM

## 2022-04-23 RX ORDER — LANOLIN ALCOHOL/MO/W.PET/CERES
6 CREAM (GRAM) TOPICAL
Status: DISCONTINUED | OUTPATIENT
Start: 2022-04-23 | End: 2022-04-25 | Stop reason: HOSPADM

## 2022-04-23 RX ORDER — LEVOFLOXACIN 5 MG/ML
500 INJECTION, SOLUTION INTRAVENOUS EVERY 24 HOURS
Status: DISCONTINUED | OUTPATIENT
Start: 2022-04-24 | End: 2022-04-24

## 2022-04-23 RX ADMIN — UMECLIDINIUM 1 PUFF: 62.5 AEROSOL, POWDER ORAL at 17:43

## 2022-04-23 RX ADMIN — MELATONIN 6 MG: at 21:39

## 2022-04-23 RX ADMIN — ENOXAPARIN SODIUM 40 MG: 40 INJECTION SUBCUTANEOUS at 15:05

## 2022-04-23 RX ADMIN — AMLODIPINE BESYLATE 5 MG: 5 TABLET ORAL at 15:04

## 2022-04-23 RX ADMIN — Medication 125 MG: at 21:39

## 2022-04-23 RX ADMIN — BUDESONIDE AND FORMOTEROL FUMARATE DIHYDRATE 2 PUFF: 160; 4.5 AEROSOL RESPIRATORY (INHALATION) at 17:44

## 2022-04-23 RX ADMIN — FUROSEMIDE 10 MG: 20 TABLET ORAL at 15:05

## 2022-04-23 RX ADMIN — GUAIFENESIN 600 MG: 600 TABLET, EXTENDED RELEASE ORAL at 17:41

## 2022-04-23 RX ADMIN — METOPROLOL TARTRATE 50 MG: 50 TABLET, FILM COATED ORAL at 21:39

## 2022-04-23 RX ADMIN — LEVOFLOXACIN 750 MG: 5 INJECTION, SOLUTION INTRAVENOUS at 12:47

## 2022-04-23 NOTE — ED PROVIDER NOTES
History  Chief Complaint   Patient presents with    Fever - 76 years or older     Pt c/o fever, cough started today does c/o SOB reports chronic not worse then normal      81 yo M h/o COPD, chronic O2 dependence on 2L NC, CHF, HTN; presenting for fever/cough/SOB  Brought in by EMS  [place] speaking, bilingual staff used to obtain history  Fever noted by EMS and pt given Tylenol en route  Denies any areas of pain or other complaints    Wife arrived at bedside and states pt generally weak today and had fever                   Prior to Admission Medications   Prescriptions Last Dose Informant Patient Reported? Taking?   acetaminophen (TYLENOL) 325 mg tablet  Self No No   Sig: Take 2 tablets (650 mg total) by mouth every 6 (six) hours as needed for fever (temperature greater than 101 F)   albuterol (2 5 mg/3 mL) 0 083 % nebulizer solution  Self No No   Sig: Take 1 vial (2 5 mg total) by nebulization every 6 (six) hours as needed for wheezing or shortness of breath   albuterol (VENTOLIN HFA) 90 mcg/act inhaler  Self Yes No   Sig: Inhale 2 puffs   amLODIPine (NORVASC) 10 mg tablet   No No   Sig: Take 0 5 tablets (5 mg total) by mouth daily   aspirin (Aspirin 81) 81 mg EC tablet   No No   Sig: Take 1 tablet (81 mg total) by mouth daily   atorvastatin (LIPITOR) 10 mg tablet  Self No No   Sig: Take 1 tablet (10 mg total) by mouth daily   budesonide-formoterol (SYMBICORT) 160-4 5 mcg/act inhaler  Self No No   Sig: Inhale 2 puffs 2 (two) times a day Rinse mouth after use     ferrous sulfate 325 (65 Fe) mg tablet   Yes No   Sig: Take 325 mg by mouth daily with breakfast   furosemide (Lasix) 20 mg tablet   No No   Sig: Take 0 5 tablets (10 mg total) by mouth daily   melatonin 3 mg  Self No No   Sig: Take 2 tablets (6 mg total) by mouth daily at bedtime   Patient not taking: Reported on 6/19/2020   metoprolol tartrate (LOPRESSOR) 50 mg tablet  Self No No   Sig: Take 1 tablet (50 mg total) by mouth every 12 (twelve) hours pantoprazole (PROTONIX) 40 mg tablet  Self No No   Sig: Take 1 tablet (40 mg total) by mouth daily   tiotropium (SPIRIVA) 18 mcg inhalation capsule  Self No No   Sig: Place 1 capsule (18 mcg total) into inhaler and inhale daily      Facility-Administered Medications: None       Past Medical History:   Diagnosis Date    Acute metabolic encephalopathy 3/51/3340    Appendicolith     Ascending aortic aneurysm (HCC)     3 7    Asthma     BPH (benign prostatic hyperplasia)     CAD (coronary artery disease)     noted on CT scan    COPD (chronic obstructive pulmonary disease) (HCC)     Descending thoracic aortic aneurysm ([address] )     Diverticulosis     Former tobacco use     GERD (gastroesophageal reflux disease)     History of DVT (deep vein thrombosis)     Left leg    History of transfusion     Hypertension     Inguinal hernia     right    Nephrolithiasis     Oxygen dependent     2LNC    Prostate calculus     [facility]peripheral vascular [facility]) [facility])     Ulcer     Varicose vein of leg     b/l       Past Surgical History:   Procedure Laterality Date    CARDIAC SURGERY      ESOPHAGOGASTRODUODENOSCOPY      HERNIA REPAIR Right 1/21/2019    Procedure: REPAIR HERNIA INGUINAL WITH MESH;  Surgeon: Edvin Coronado MD;  Location: [facility] MAIN OR;  Service: General    INGUINAL HERNIA REPAIR Bilateral     IR TEVAR  12/27/2018    ND ENDOVASC TAA REINCL SUBCL N/A 12/27/2018    Procedure: TEVAR - endovascular thoracic aortic aneurysm repair;  Surgeon: Manuel Saldana MD;  Location:  MAIN OR;  Service: Vascular    THORACIC AORTIC ANEURYSM REPAIR  12/27/2018    VARICOSE VEIN SURGERY Bilateral     vein stripping       Family History   Problem Relation Age of Onset    Tuberculosis Mother     No Known Problems Father     Cancer Sister     Diabetes Family     Hypertension Family      I have reviewed and agree with the history as documented      E-Cigarette/Vaping    E-Cigarette Use Never User      E-Cigarette/Vaping Substances     Social History     Tobacco Use    Smoking status: Former Smoker     Packs/day: 1 00     Years: 35 00     Pack years: 35 00     Start date:      Quit date:      Years since quittin 3    Smokeless tobacco: Never Used   Vaping Use    Vaping Use: Never used   Substance Use Topics    Alcohol use: Never    Drug use: No       Review of Systems   Constitutional: Positive for fever  Negative for chills and unexpected weight change  HENT: Negative for ear pain, rhinorrhea and sore throat  Eyes: Negative for pain and visual disturbance  Respiratory: Positive for cough and shortness of breath  Cardiovascular: Negative for chest pain and leg swelling  Gastrointestinal: Negative for abdominal pain, constipation, diarrhea, nausea and vomiting  Endocrine: Negative for polydipsia, polyphagia and polyuria  Genitourinary: Negative for dysuria, frequency, hematuria and urgency  Musculoskeletal: Negative for back pain, myalgias and neck pain  Skin: Negative for color change and rash  Allergic/Immunologic: Negative for environmental allergies and immunocompromised state  Neurological: Negative for dizziness, weakness, light-headedness, numbness and headaches  Hematological: Negative for adenopathy  Does not bruise/bleed easily  Psychiatric/Behavioral: Negative for agitation and confusion  All other systems reviewed and are negative  Physical Exam  Physical Exam  Vitals and nursing note reviewed  Constitutional:       General: He is not in acute distress  Appearance: He is well-developed  HENT:      Head: Normocephalic and atraumatic  Nose: Nose normal    Eyes:      Conjunctiva/sclera: Conjunctivae normal    Cardiovascular:      Rate and Rhythm: Normal rate and regular rhythm  Heart sounds: Normal heart sounds  Pulmonary:      Effort: Pulmonary effort is normal  No respiratory distress  Breath sounds: Normal breath sounds  No stridor   No wheezing or rales  Chest:      Chest wall: No tenderness  Abdominal:      General: There is no distension  Palpations: Abdomen is soft  Tenderness: There is no abdominal tenderness  There is no guarding or rebound  Musculoskeletal:         General: No swelling, tenderness or deformity  Cervical back: Normal range of motion and neck supple  Comments: No calf swelling/ttp, no peripheral edema   Skin:     General: Skin is warm and dry  Findings: No rash  Neurological:      General: No focal deficit present  Mental Status: He is alert and oriented to person, place, and time  Motor: No abnormal muscle tone  Coordination: Coordination normal    Psychiatric:         Thought Content:  Thought content normal          Judgment: Judgment normal          Vital Signs  ED Triage Vitals [04/23/22 1141]   Temperature Pulse Respirations Blood Pressure SpO2   (!) 101 2 °F (38 4 °C) 66 (!) 28 (!) 188/81 (!) 66 %      Temp Source Heart Rate Source Patient Position - Orthostatic VS BP Location FiO2 (%)   Oral Monitor Sitting Right arm --      Pain Score       10 - Worst Possible Pain           Vitals:    04/23/22 1141 04/23/22 1255   BP: (!) 188/81 (!) 177/79   Pulse: 66 57   Patient Position - Orthostatic VS: Sitting Lying         Visual Acuity      ED Medications  Medications   sodium chloride (PF) 0 9 % injection 3 mL (has no administration in time range)   levofloxacin (LEVAQUIN) IVPB (premix in dextrose) 750 mg 150 mL (750 mg Intravenous New Bag 4/23/22 1247)       Diagnostic Studies  Results Reviewed     Procedure Component Value Units Date/Time    COVID/FLU/RSV - 2 hour TAT [[id number]]  (Normal) Collected: 04/23/22 1155    Lab Status: Final result Specimen: Nares from Nasopharyngeal Swab Updated: 04/23/22 1319     SARS-CoV-2 Negative     INFLUENZA A PCR Negative     INFLUENZA B PCR Negative     RSV PCR Negative    Narrative:      FOR PEDIATRIC PATIENTS - copy/paste COVID Guidelines URL to browser: [email or web address]    SARS-CoV-2 assay is a Nucleic Acid Amplification assay intended for the  qualitative detection of nucleic acid from SARS-CoV-2 in nasopharyngeal  swabs  Results are for the presumptive identification of SARS-CoV-2 RNA  Positive results are indicative of infection with SARS-CoV-2, the virus  causing COVID-19, but do not rule out bacterial infection or co-infection  with other viruses  Laboratories within the [place] and its  territories are required to report all positive results to the appropriate  public health authorities  Negative results do not preclude SARS-CoV-2  infection and should not be used as the sole basis for treatment or other  patient management decisions  Negative results must be combined with  clinical observations, patient history, and epidemiological information  This test has not been FDA cleared or approved  This test has been authorized by FDA under an Emergency Use Authorization  (EUA)  This test is only authorized for the duration of time the  declaration that circumstances exist justifying the authorization of the  emergency use of an in vitro diagnostic tests for detection of SARS-CoV-2  virus and/or diagnosis of COVID-19 infection under section 564(b)(1) of  the Act, 21 U  S C  334HCG-9(E)(0), unless the authorization is terminated  or revoked sooner  The test has been validated but independent review by FDA  and CLIA is pending  Test performed using [employer] GeneXpert: This RT-PCR assay targets N2,  a region unique to SARS-CoV-2  A conserved region in the E-gene was chosen  for pan-Sarbecovirus detection which includes SARS-CoV-2      Comprehensive metabolic panel [[id number]]  (Abnormal) Collected: 04/23/22 1155    Lab Status: Final result Specimen: Blood from Arm, Left Updated: 04/23/22 1241     Sodium 145 mmol/L      Potassium 4 2 mmol/L      Chloride 104 mmol/L      CO2 38 mmol/L ANION GAP 3 mmol/L      BUN 19 mg/dL      Creatinine 0 99 mg/dL      Glucose 117 mg/dL      Calcium 8 8 mg/dL      Corrected Calcium 9 4 mg/dL      AST 22 U/L      ALT 19 U/L      Alkaline Phosphatase 76 U/L      Total Protein 7 5 g/dL      Albumin 3 2 g/dL      Total Bilirubin 0 74 mg/dL      eGFR 69 ml/min/1 73sq m     Narrative:      [place] guidelines for Chronic Kidney Disease (CKD):     Stage 1 with normal or high GFR (GFR > 90 mL/min/1 73 square meters)    Stage 2 Mild CKD (GFR = 60-89 mL/min/1 73 square meters)    Stage 3A Moderate CKD (GFR = 45-59 mL/min/1 73 square meters)    Stage 3B Moderate CKD (GFR = 30-44 mL/min/1 73 square meters)    Stage 4 Severe CKD (GFR = 15-29 mL/min/1 73 square meters)    Stage 5 End Stage CKD (GFR <15 mL/min/1 73 square meters)  Note: GFR calculation is accurate only with a steady state creatinine    NT-BNP PRO [[id number]]  (Abnormal) Collected: 04/23/22 1155    Lab Status: Final result Specimen: Blood from Arm, Left Updated: 04/23/22 1237     NT-proBNP 992 pg/mL     Procalcitonin [[id number]]  (Normal) Collected: 04/23/22 1155    Lab Status: Final result Specimen: Blood from Arm, Left Updated: 04/23/22 1234     Procalcitonin 0 05 ng/ml     Lactic Acid [[id number]]  (Normal) Collected: 04/23/22 1155    Lab Status: Final result Specimen: Blood from Arm, Left Updated: 04/23/22 1232     LACTIC ACID 1 1 mmol/L     Narrative:      Result may be elevated if tourniquet was used during collection      HS Troponin I 2hr [[id number]]     Lab Status: No result Specimen: Blood     HS Troponin I 4hr [[id number]]     Lab Status: No result Specimen: Blood     HS Troponin 0hr (reflex protocol) [[id number]]  (Normal) Collected: 04/23/22 1155    Lab Status: Final result Specimen: Blood from Arm, Left Updated: 04/23/22 1231     hs TnI 0hr 11 ng/L     Protime-INR [[id number]]  (Normal) Collected: 04/23/22 1155    Lab Status: Final result Specimen: Blood from Arm, Left Updated: 04/23/22 1219     Protime 13 9 seconds      INR 1 10    APTT [[id number]]  (Normal) Collected: 04/23/22 1155    Lab Status: Final result Specimen: Blood from Arm, Left Updated: 04/23/22 1219     PTT 30 seconds     CBC and differential [[id number]]  (Abnormal) Collected: 04/23/22 1155    Lab Status: Final result Specimen: Blood from Arm, Left Updated: 04/23/22 1204     WBC 8 18 Thousand/uL      RBC 4 19 Million/uL      Hemoglobin 12 5 g/dL      Hematocrit 43 9 %       fL      MCH 29 8 pg      MCHC 28 5 g/dL      RDW 14 7 %      MPV 8 9 fL      Platelets 053 Thousands/uL      nRBC 0 /100 WBCs      Neutrophils Relative 68 %      Immat GRANS % 0 %      Lymphocytes Relative 11 %      Monocytes Relative 12 %      Eosinophils Relative 8 %      Basophils Relative 1 %      Neutrophils Absolute 5 61 Thousands/µL      Immature Grans Absolute 0 03 Thousand/uL      Lymphocytes Absolute 0 87 Thousands/µL      Monocytes Absolute 0 97 Thousand/µL      Eosinophils Absolute 0 65 Thousand/µL      Basophils Absolute 0 05 Thousands/µL     Blood culture #2 [[id number]] Collected: 04/23/22 1155    Lab Status: In process Specimen: Blood from Arm, Left Updated: 04/23/22 1202    Blood culture #1 [[id number]] Collected: 04/23/22 1158    Lab Status: In process Specimen: Blood from Hand, Right Updated: 04/23/22 1202    UA w Reflex to Microscopic w Reflex to Culture [[id number]]     Lab Status: No result Specimen: Urine                  XR chest 1 view portable    (Results Pending)              Procedures  Procedures         ED Course  ED Course as of 04/23/22 1330   Sat Apr 23, 2022   1149 EKG: NSR @ 63 bpm, normal axis, normal intervals, nonspecific st changes, similar to previous from 3/12/22   1149 SpO2(!): 66 %  On patient's baseline 2L NC  Increased to 6L NC and O2 sat 88-90%   1150 SIRS with fever/tachypnea  Pt has listed allergy to PCN and cannot find if pt has ever had cephalosporins   Will give Levaquin to cover sepsis measures/PNA   1214 CXR interpreted by myself: LLL/basilar infiltrate, CHF similar   1305 Family at bedside now and updated regarding results and plan  Able to wean O2 down to 4L NC   1318 SLIM aware, waiting for covid result before bed order             HEART Risk Score      Most Recent Value   Heart Score Risk Calculator    History 0 Filed at: 04/23/2022 1233   ECG 0 Filed at: 04/23/2022 1233   Age 2 Filed at: 04/23/2022 1233   Risk Factors 2 Filed at: 04/23/2022 1233   Troponin 0 Filed at: 04/23/2022 1233   HEART Score 4 Filed at: 04/23/2022 1233                     Initial Sepsis Screening     Row Name 04/23/22 1148                Is the patient's history suggestive of a new or worsening infection? Yes (Proceed)  -[address]        Suspected source of infection pneumonia  -KH        Are two or more of the following signs & symptoms of infection both present and new to the patient? Yes (Proceed)  -[facility]        Indicate SIRS criteria Hyperthemia > 38 3C (100 9F); Tachypnea > 20 resp per min  -KH        If the answer is yes to both questions, suspicion of sepsis is present --        If severe sepsis is present AND tissue hypoperfusion perists in the hour after fluid resuscitation or lactate > 4, the patient meets criteria for SEPTIC SHOCK --        Are any of the following organ dysfunction criteria present within 6 hours of suspected infection and SIRS criteria that are NOT considered to be chronic conditions?  No  -KH        Organ dysfunction --        Date of presentation of severe sepsis --        Time of presentation of severe sepsis --        Tissue hypoperfusion persists in the hour after crystalloid fluid administration, evidenced, by either: --        Was hypotension present within one hour of the conclusion of crystalloid fluid administration? --        Date of presentation of septic shock --        Time of presentation of septic shock --              User Key  (r) = Recorded By, (t) = Taken By, (c) = Cosigned By [id number]  Name Provider Type    [address] [place] [address] Physician                              MDM  Number of Diagnoses or Management Options  Acute-on-chronic respiratory failure ([facility] )  Fever  Pneumonia  Diagnosis management comments: 83 yo M presenting with fever/cough/increased O2 requirement, sepsis workup, suspect PNA, given ABx and admitted for further management       Amount and/or Complexity of Data Reviewed  Clinical lab tests: ordered and reviewed  Tests in the radiology section of CPT®: ordered and reviewed  Tests in the medicine section of CPT®: ordered and reviewed  Review and summarize past medical records: yes  Independent visualization of images, tracings, or specimens: yes        Disposition  Final diagnoses:   Acute-on-chronic respiratory failure ([facility] )   Fever   Pneumonia     Time reflects when diagnosis was documented in both MDM as applicable and the Disposition within this note     Time User Action Codes Description Comment    4/23/2022  1:16 PM Mehdi GALAVIZ Add [J96 20] Acute-on-chronic respiratory failure ([facility] )     4/23/2022  1:16 PM Susana Bay Add [R50 9] Fever     4/23/2022  1:16 PM Mehdi GALAVIZ Add [J18 9] Pneumonia       ED Disposition     ED Disposition Condition Date/Time Comment    Admit Stable Sat Apr 23, 2022  1:15 PM Case was discussed with MONICO and the patient's admission status was agreed to be Admission Status: inpatient status to the service of Dr Jacob Rosario   Follow-up Information    None         Patient's Medications   Discharge Prescriptions    No medications on file       No discharge procedures on file      PDMP Review     None          ED Provider  Electronically Signed by           Rebecca Taylor DO  04/23/22 3341

## 2022-04-23 NOTE — ASSESSMENT & PLAN NOTE
Wt Readings from Last 3 Encounters:   04/23/22 69 7 kg (153 lb 10 6 oz)   03/17/22 62 2 kg (137 lb 2 oz)   11/09/21 67 kg (147 lb 12 8 oz)         Continue lasix

## 2022-04-23 NOTE — ASSESSMENT & PLAN NOTE
Admit to med/surg  Presents to ED with 2 day hx of increased SOB/cough/fever    On chronic O2 at home, increased O2 requirements in ED  Covid/flu negative  CXR reports LLL infiltrate    Started on IV levaquin, will continue

## 2022-04-23 NOTE — H&P
[facility]  H&P- Jess Toro [birth year], 82 y o  male MRN: [id number]  Unit/Bed#: [address] [id number] Encounter: [id number]  Primary Care Provider: Phan Sharma MD   Date and time admitted to hospital: 4/23/2022 11:34 AM    CHF (congestive heart failure) ([address] )  Assessment & Plan  Wt Readings from Last 3 Encounters:   04/23/22 69 7 kg (153 lb 10 6 oz)   03/17/22 62 2 kg (137 lb 2 oz)   11/09/21 67 kg (147 lb 12 8 oz)         Continue lasix      Hyperlipidemia  Assessment & Plan  Continue statin    Benign essential hypertension  Assessment & Plan  Continue home metoprolol, norvasc    COPD (chronic obstructive pulmonary disease) ([facility])  Assessment & Plan  Continue nebs/inhalers    * Pneumonia  Assessment & Plan  Admit to med/surg  Presents to ED with 2 day hx of increased SOB/cough/fever    On chronic O2 at home, increased O2 requirements in ED  Covid/flu negative  CXR reports LLL infiltrate    Started on IV levaquin, will continue      VTE Prophylaxis: Enoxaparin (Lovenox)  / sequential compression device   Code Status: full code  POLST: There is no POLST form on file for this patient (pre-hospital)  Discussion with family: wife at bedside, spoke with son over the phone    Anticipated Length of Stay:  Patient will be admitted on an Inpatient basis with an anticipated length of stay of  Greater than 2 midnights  Justification for Hospital Stay: patient requires IV antibiotics    Total Time for Visit, including Counseling / Coordination of Care: 45 minutes  Greater than 50% of this total time spent on direct patient counseling and coordination of care  Chief Complaint:   SOB, cough, fever    History of Present Illness:    Jess Toro is a 82 y o  male who presents with SOB, cough, fever worse since yesterday  Azeri speaking, history obtained through wife and son  Has known COPD and CHF  On chronic O2 and has nebs at home  Home treatments not effective  Review of Systems:    Review of Systems   Constitutional: Positive for fever  HENT: Negative  Eyes: Negative  Respiratory: Positive for cough and shortness of breath  Cardiovascular: Negative  Gastrointestinal: Negative  Endocrine: Negative  Genitourinary: Negative  Musculoskeletal: Positive for arthralgias  Skin: Negative  Allergic/Immunologic: Negative  Neurological: Negative  Hematological: Negative  Psychiatric/Behavioral: Negative          Past Medical and Surgical History:     Past Medical History:   Diagnosis Date    Acute metabolic encephalopathy 1/65/8124    Appendicolith     Ascending aortic aneurysm (HCC)     3 7    Asthma     BPH (benign prostatic hyperplasia)     CAD (coronary artery disease)     noted on CT scan    COPD (chronic obstructive pulmonary disease) (HCC)     Descending thoracic aortic aneurysm (HCC)     Diverticulosis     Former tobacco use     GERD (gastroesophageal reflux disease)     History of DVT (deep vein thrombosis)     Left leg    History of transfusion     Hypertension     Inguinal hernia     right    Nephrolithiasis     Oxygen dependent     2LNC    Prostate calculus     PVD (peripheral vascular disease) (HCC)     Ulcer     Varicose vein of leg     b/l       Past Surgical History:   Procedure Laterality Date    CARDIAC SURGERY      ESOPHAGOGASTRODUODENOSCOPY      HERNIA REPAIR Right 1/21/2019    Procedure: REPAIR HERNIA INGUINAL WITH MESH;  Surgeon: Loyda Mejia MD;  Location: [address] [place] [address] MAIN OR;  Service: General    INGUINAL HERNIA REPAIR Bilateral     IR TEVAR  12/27/2018    [place] ENDOVASC TAA REINCL SUBCL N/A 12/27/2018    Procedure: TEVAR - endovascular thoracic aortic aneurysm repair;  Surgeon: Mac Ortega MD;  Location:  MAIN OR;  Service: Vascular    THORACIC AORTIC ANEURYSM REPAIR  12/27/2018    VARICOSE VEIN SURGERY Bilateral     vein stripping       Meds/Allergies:    Prior to Admission medications Medication Sig Start Date End Date Taking? Authorizing Provider   acetaminophen (TYLENOL) 325 mg tablet Take 2 tablets (650 mg total) by mouth every 6 (six) hours as needed for fever (temperature greater than 101 F) 12/31/18  Yes Lucas Munoz PA-C   albuterol (2 5 mg/3 mL) 0 083 % nebulizer solution Take 1 vial (2 5 mg total) by nebulization every 6 (six) hours as needed for wheezing or shortness of breath 6/26/18   Justino Hernadez MD   albuterol (VENTOLIN HFA) 90 mcg/act inhaler Inhale 2 puffs 8/8/17   Historical Provider, MD   amLODIPine (NORVASC) 10 mg tablet Take 0 5 tablets (5 mg total) by mouth daily 8/10/21   Mukund Soria PA-C   aspirin (Aspirin 81) 81 mg EC tablet Take 1 tablet (81 mg total) by mouth daily 10/15/21   Jazmine Acuna MD   atorvastatin (LIPITOR) 10 mg tablet Take 1 tablet (10 mg total) by mouth daily 6/16/20   Mohini Archuleta DO   budesonide-formoterol (SYMBICORT) 160-4 5 mcg/act inhaler Inhale 2 puffs 2 (two) times a day Rinse mouth after use  6/16/20   Mohini Archuleta DO   ferrous sulfate 325 (65 Fe) mg tablet Take 325 mg by mouth daily with breakfast    Historical Provider, MD   furosemide (Lasix) 20 mg tablet Take 0 5 tablets (10 mg total) by mouth daily 3/17/22   Aracelis Ocampo DO   melatonin 3 mg Take 2 tablets (6 mg total) by mouth daily at bedtime  Patient not taking: Reported on 6/19/2020 6/16/20   Mohini Archuleta DO   metoprolol tartrate (LOPRESSOR) 50 mg tablet Take 1 tablet (50 mg total) by mouth every 12 (twelve) hours 6/16/20   Mohini Archuleta DO   pantoprazole (PROTONIX) 40 mg tablet Take 1 tablet (40 mg total) by mouth daily 6/16/20   Mohini Archuleta DO   tiotropium (SPIRIVA) 18 mcg inhalation capsule Place 1 capsule (18 mcg total) into inhaler and inhale daily 6/16/20   Mohini Archuleta DO     I have reviewed home medications with patient family member  Allergies:    Allergies   Allergen Reactions    Penicillins Hives, Itching and Rash    Lisinopril Rash Side pains and rash        Social History:     Marital Status: /Civil Union   Occupation: retired  Patient Pre-hospital Living Situation: lives with family  Patient Pre-hospital Level of Mobility: ambulatory  Patient Pre-hospital Diet Restrictions: none  Substance Use History:   Social History     Substance and Sexual Activity   Alcohol Use Never     Social History     Tobacco Use   Smoking Status Former Smoker    Packs/day: 1 00    Years: 35 00    Pack years: 35 00    Start date:     Quit date:     Years since quittin 3   Smokeless Tobacco Never Used     Social History     Substance and Sexual Activity   Drug Use No       Family History:    non-contributory    Physical Exam:     Vitals:   Blood Pressure: 167/78 (22 1421)  Pulse: 58 (22 1421)  Temperature: 98 9 °F (37 2 °C) (22 1421)  Temp Source: Oral (22 1255)  Respirations: (!) 24 (22 1400)  Weight - Scale: 69 7 kg (153 lb 10 6 oz) (22 1141)  SpO2: 97 % (22 1421)    Physical Exam  Vitals reviewed  Constitutional:       General: He is not in acute distress  Appearance: He is not ill-appearing or diaphoretic  HENT:      Head: Normocephalic and atraumatic  Nose: Nose normal  No congestion  Mouth/Throat:      Mouth: Mucous membranes are moist       Pharynx: Oropharynx is clear  Eyes:      Conjunctiva/sclera: Conjunctivae normal       Pupils: Pupils are equal, round, and reactive to light  Cardiovascular:      Rate and Rhythm: Normal rate  Pulses: Normal pulses  Pulmonary:      Effort: Pulmonary effort is normal  No respiratory distress  Abdominal:      General: Abdomen is flat  Bowel sounds are normal  There is no distension  Palpations: Abdomen is soft  Tenderness: There is no abdominal tenderness  Musculoskeletal:         General: No deformity or signs of injury  Cervical back: Neck supple  Lymphadenopathy:      Cervical: No cervical adenopathy  Skin:     General: Skin is warm and dry  Findings: No bruising or erythema  Neurological:      Mental Status: He is alert  Mental status is at baseline  Psychiatric:         Mood and Affect: Mood normal          Behavior: Behavior normal              Additional Data:     Lab Results: I have personally reviewed pertinent reports  Results from last 7 days   Lab Units 04/23/22  1155   WBC Thousand/uL 8 18   HEMOGLOBIN g/dL 12 5   HEMATOCRIT % 43 9   PLATELETS Thousands/uL 135*   NEUTROS PCT % 68   LYMPHS PCT % 11*   MONOS PCT % 12   EOS PCT % 8*     Results from last 7 days   Lab Units 04/23/22  1155   SODIUM mmol/L 145   POTASSIUM mmol/L 4 2   CHLORIDE mmol/L 104   CO2 mmol/L 38*   BUN mg/dL 19   CREATININE mg/dL 0 99   ANION GAP mmol/L 3*   CALCIUM mg/dL 8 8   ALBUMIN g/dL 3 2*   TOTAL BILIRUBIN mg/dL 0 74   ALK PHOS U/L 76   ALT U/L 19   AST U/L 22   GLUCOSE RANDOM mg/dL 117     Results from last 7 days   Lab Units 04/23/22  1155   INR  1 10             Results from last 7 days   Lab Units 04/23/22  1155   LACTIC ACID mmol/L 1 1   PROCALCITONIN ng/ml 0 05       Imaging: I have personally reviewed pertinent reports  XR chest 1 view portable    (Results Pending)       EKG, Pathology, and Other Studies Reviewed on Admission:   · EKG: reviewed    [facility] Review[facility]: Yes     ** Please Note: This note has been constructed using a voice recognition system   **

## 2022-04-23 NOTE — PLAN OF CARE
Problem: MOBILITY - ADULT  Goal: Maintain or return to baseline ADL function  Description: INTERVENTIONS:  -  Assess patient's ability to carry out ADLs; assess patient's baseline for ADL function and identify physical deficits which impact ability to perform ADLs (bathing, care of mouth/teeth, toileting, grooming, dressing, etc )  - Assess/evaluate cause of self-care deficits   - Assess range of motion  - Assess patient's mobility; develop plan if impaired  - Assess patient's need for assistive devices and provide as appropriate  - Encourage maximum independence but intervene and supervise when necessary  - Involve family in performance of ADLs  - Assess for home care needs following discharge   - Consider OT consult to assist with ADL evaluation and planning for discharge  - Provide patient education as appropriate  Outcome: Progressing  Goal: Maintains/Returns to pre admission functional level  Description: INTERVENTIONS:  - Perform BMAT or MOVE assessment daily    - Set and communicate daily mobility goal to care team and patient/family/caregiver  - Collaborate with rehabilitation services on mobility goals if consulted  - Perform Range of Motion 4 times a day  - Reposition patient every 2 hours    - Dangle patient 4 times a day  - Stand patient 4 times a day  - Ambulate patient 4 times a day  - Out of bed to chair 4 times a day   - Out of bed for meals 4 times a day  - Out of bed for toileting  - Record patient progress and toleration of activity level   Outcome: Progressing     Problem: Potential for Falls  Goal: Patient will remain free of falls  Description: INTERVENTIONS:  - Educate patient/family on patient safety including physical limitations  - Instruct patient to call for assistance with activity   - Consult OT/PT to assist with strengthening/mobility   - Keep Call bell within reach  - Keep bed low and locked with side rails adjusted as appropriate  - Keep care items and personal belongings within reach  - Initiate and maintain comfort rounds  - Make Fall Risk Sign visible to staff  - Offer Toileting every 2 Hours, in advance of need  - Initiate/Maintain bed alarm  - Obtain necessary fall risk management equipment: alarms  - Apply yellow socks and bracelet for high fall risk patients  - Consider moving patient to room near nurses station  Outcome: Progressing     Problem: PAIN - ADULT  Goal: Verbalizes/displays adequate comfort level or baseline comfort level  Description: Interventions:  - Encourage patient to monitor pain and request assistance  - Assess pain using appropriate pain scale  - Administer analgesics based on type and severity of pain and evaluate response  - Implement non-pharmacological measures as appropriate and evaluate response  - Consider cultural and social influences on pain and pain management  - Notify physician/advanced practitioner if interventions unsuccessful or patient reports new pain  Outcome: Progressing     Problem: INFECTION - ADULT  Goal: Absence or prevention of progression during hospitalization  Description: INTERVENTIONS:  - Assess and monitor for signs and symptoms of infection  - Monitor lab/diagnostic results  - Monitor all insertion sites, i e  indwelling lines, tubes, and drains  - Monitor endotracheal if appropriate and nasal secretions for changes in amount and color  - [place] appropriate cooling/warming therapies per order  - Administer medications as ordered  - Instruct and encourage patient and family to use good hand hygiene technique  - Identify and instruct in appropriate isolation precautions for identified infection/condition  Outcome: Progressing     Problem: RESPIRATORY - ADULT  Goal: Achieves optimal ventilation and oxygenation  Description: INTERVENTIONS:  - Assess for changes in respiratory status  - Assess for changes in mentation and behavior  - Position to facilitate oxygenation and minimize respiratory effort  - Oxygen administered by appropriate delivery if ordered  - Initiate smoking cessation education as indicated  - Encourage broncho-pulmonary hygiene including cough, deep breathe, Incentive Spirometry  - Assess the need for suctioning and aspirate as needed  - Assess and instruct to report SOB or any respiratory difficulty  - Respiratory Therapy support as indicated  Outcome: Progressing

## 2022-04-24 LAB
ANION GAP SERPL CALCULATED.3IONS-SCNC: 5 MMOL/L (ref 4–13)
BUN SERPL-MCNC: 16 MG/DL (ref 5–25)
CALCIUM SERPL-MCNC: 8.9 MG/DL (ref 8.3–10.1)
CHLORIDE SERPL-SCNC: 100 MMOL/L (ref 100–108)
CO2 SERPL-SCNC: 38 MMOL/L (ref 21–32)
CREAT SERPL-MCNC: 0.91 MG/DL (ref 0.6–1.3)
ERYTHROCYTE [DISTWIDTH] IN BLOOD BY AUTOMATED COUNT: 14.6 % (ref 11.6–15.1)
GFR SERPL CREATININE-BSD FRML MDRD: 77 ML/MIN/1.73SQ M
GLUCOSE SERPL-MCNC: 99 MG/DL (ref 65–140)
HCT VFR BLD AUTO: 45 % (ref 36.5–49.3)
HGB BLD-MCNC: 12.8 G/DL (ref 12–17)
MCH RBC QN AUTO: 30 PG (ref 26.8–34.3)
MCHC RBC AUTO-ENTMCNC: 28.4 G/DL (ref 31.4–37.4)
MCV RBC AUTO: 105 FL (ref 82–98)
PLATELET # BLD AUTO: 133 THOUSANDS/UL (ref 149–390)
PMV BLD AUTO: 9 FL (ref 8.9–12.7)
POTASSIUM SERPL-SCNC: 3.9 MMOL/L (ref 3.5–5.3)
PROCALCITONIN SERPL-MCNC: 0.1 NG/ML
RBC # BLD AUTO: 4.27 MILLION/UL (ref 3.88–5.62)
SODIUM SERPL-SCNC: 143 MMOL/L (ref 136–145)
WBC # BLD AUTO: 7.88 THOUSAND/UL (ref 4.31–10.16)

## 2022-04-24 PROCEDURE — 85027 COMPLETE CBC AUTOMATED: CPT | Performed by: PHYSICIAN ASSISTANT

## 2022-04-24 PROCEDURE — 99232 SBSQ HOSP IP/OBS MODERATE 35: CPT | Performed by: HOSPITALIST

## 2022-04-24 PROCEDURE — 97163 PT EVAL HIGH COMPLEX 45 MIN: CPT

## 2022-04-24 PROCEDURE — 84145 PROCALCITONIN (PCT): CPT | Performed by: PHYSICIAN ASSISTANT

## 2022-04-24 PROCEDURE — 80048 BASIC METABOLIC PNL TOTAL CA: CPT | Performed by: PHYSICIAN ASSISTANT

## 2022-04-24 PROCEDURE — 97166 OT EVAL MOD COMPLEX 45 MIN: CPT

## 2022-04-24 RX ORDER — FUROSEMIDE 10 MG/ML
20 INJECTION INTRAMUSCULAR; INTRAVENOUS DAILY
Status: DISCONTINUED | OUTPATIENT
Start: 2022-04-24 | End: 2022-04-25 | Stop reason: HOSPADM

## 2022-04-24 RX ADMIN — ENOXAPARIN SODIUM 40 MG: 40 INJECTION SUBCUTANEOUS at 08:43

## 2022-04-24 RX ADMIN — GUAIFENESIN 600 MG: 600 TABLET, EXTENDED RELEASE ORAL at 08:41

## 2022-04-24 RX ADMIN — UMECLIDINIUM 1 PUFF: 62.5 AEROSOL, POWDER ORAL at 08:42

## 2022-04-24 RX ADMIN — METOPROLOL TARTRATE 50 MG: 50 TABLET, FILM COATED ORAL at 21:32

## 2022-04-24 RX ADMIN — Medication 125 MG: at 08:42

## 2022-04-24 RX ADMIN — PANTOPRAZOLE SODIUM 40 MG: 40 TABLET, DELAYED RELEASE ORAL at 08:41

## 2022-04-24 RX ADMIN — FUROSEMIDE 10 MG: 20 TABLET ORAL at 08:41

## 2022-04-24 RX ADMIN — ATORVASTATIN CALCIUM 10 MG: 10 TABLET, FILM COATED ORAL at 08:41

## 2022-04-24 RX ADMIN — ASPIRIN 81 MG: 81 TABLET, COATED ORAL at 08:41

## 2022-04-24 RX ADMIN — METOPROLOL TARTRATE 50 MG: 50 TABLET, FILM COATED ORAL at 08:41

## 2022-04-24 RX ADMIN — GUAIFENESIN 600 MG: 600 TABLET, EXTENDED RELEASE ORAL at 17:41

## 2022-04-24 RX ADMIN — BUDESONIDE AND FORMOTEROL FUMARATE DIHYDRATE 2 PUFF: 160; 4.5 AEROSOL RESPIRATORY (INHALATION) at 17:41

## 2022-04-24 RX ADMIN — BUDESONIDE AND FORMOTEROL FUMARATE DIHYDRATE 2 PUFF: 160; 4.5 AEROSOL RESPIRATORY (INHALATION) at 08:43

## 2022-04-24 RX ADMIN — Medication 325 MG: at 08:41

## 2022-04-24 RX ADMIN — MELATONIN 6 MG: at 21:32

## 2022-04-24 RX ADMIN — AMLODIPINE BESYLATE 5 MG: 5 TABLET ORAL at 08:41

## 2022-04-24 RX ADMIN — FUROSEMIDE 20 MG: 10 INJECTION, SOLUTION INTRAVENOUS at 16:00

## 2022-04-24 NOTE — UTILIZATION REVIEW
Initial Clinical Review    Admission: Date/Time/Statement:   Admission Orders (From admission, onward)     Ordered        04/23/22 1328  Inpatient Admission  Once                      Orders Placed This Encounter   Procedures    Inpatient Admission     Standing Status:   Standing     Number of Occurrences:   1     Order Specific Question:   Level of Care     Answer:   Med Surg [16]     Order Specific Question:   Estimated length of stay     Answer:   More than 2 Midnights     Order Specific Question:   Certification     Answer:   I certify that inpatient services are medically necessary for this patient for a duration of greater than two midnights  See H&P and MD Progress Notes for additional information about the patient's course of treatment  ED Arrival Information     Expected Arrival Acuity    - 4/23/2022 11:34 Emergent         Means of arrival Escorted by Service Admission type    Ambulance [facility] ([address]) Hospitalist Emergency         Arrival complaint    Fever        Chief Complaint   Patient presents with    Fever - 75 years or older     Pt c/o fever, cough started today does c/o SOB reports chronic not worse then normal        Initial Presentation: 82 y o  male   To ER via EMS  (given tylenol for fever enroute)    Admitted IP status,   MS level of care  For acute on chronic resp failure 2/2  PNA     In setting of  Chronic CHF/COPD requiring supplemental oxygen 2L NC continuous @ baseline  presenting for fever/cough/SOB for past 2 days  increased O2 requirements in ED  (sat only 66% on 2L/baseline)   Resp labored  Breath sounds diminished throughout  CXR reports LLL infiltrate            Will cont IV and po ABX therapy, wean supplemental oxygen as tolerated;  Nebs prn       Date:  4/24      Day 2:        ED Triage Vitals [04/23/22 1141]   Temperature Pulse Respirations Blood Pressure SpO2   (!) 101 2 °F (38 4 °C) 66 (!) 28 (!) 188/81 (!) 66 %      Temp Source Heart Rate Source Patient Position - Orthostatic VS BP Location FiO2 (%)   2L NC oxygen   Oral Monitor Sitting Right arm --      Pain Score       10 - Worst Possible Pain          Wt Readings from Last 1 Encounters:   04/23/22 69 7 kg (153 lb 10 6 oz)     Wt Readings from Last 3 Encounters:   04/23/22 69 7 kg (153 lb 10 6 oz)   03/17/22 62 2 kg (137 lb 2 oz)   11/09/21 67 kg (147 lb 12 8 oz)         Additional Vital Signs:   04/24/22 0930 -- -- -- -- -- -- 28 2 L/min Nasal cannula --   04/24/22 07:29:26 98 9 °F (37 2 °C) 59 17 168/79 109 91 % -- -- -- --   04/23/22 21:39:54 98 4 °F (36 9 °C) 71 -- 146/73 97 95 % -- -- -- --   04/23/22 2139 -- 66 -- 146/73 -- -- -- -- -- --   04/23/22 1435 -- -- -- -- -- -- 28 2 L/min Nasal cannula --   04/23/22 14:21:19 98 9 °F (37 2 °C) 58 18 167/78 108 97 % 36 4 L/min Nasal cannula Lying   04/23/22 1400 -- 56 24 Abnormal  149/69 99 96 % 36 4 L/min Nasal cannula Lying   04/23/22 1255 99 9 °F (37 7 °C) 57 24 Abnormal  177/79 Abnormal  -- 99 % 44 6 L/min Nasal cannula Lying         Pertinent Labs/Diagnostic Test Results:   XR chest 1 view portable   Final Result by Fredy English MD (04/24 0352)      Hazy airspace opacity at the left lung base compatible with atelectasis or infiltrate in appropriate clinical context                  Workstation performed: [id number]           Results from last 7 days   Lab Units 04/23/22  1155   SARS-COV-2  Negative     Results from last 7 days   Lab Units 04/24/22  0619 04/23/22  1155   WBC Thousand/uL 7 88 8 18   HEMOGLOBIN g/dL 12 8 12 5   HEMATOCRIT % 45 0 43 9   PLATELETS Thousands/uL 133* 135*   NEUTROS ABS Thousands/µL  --  5 61         Results from last 7 days   Lab Units 04/24/22  0619 04/23/22  1155   SODIUM mmol/L 143 145   POTASSIUM mmol/L 3 9 4 2   CHLORIDE mmol/L 100 104   CO2 mmol/L 38* 38*   ANION GAP mmol/L 5 3*   BUN mg/dL 16 19   CREATININE mg/dL 0 91 0 99   EGFR ml/min/1 73sq m 77 69   CALCIUM mg/dL 8 9 8 8     Results from last 7 days   Lab Units 04/23/22  1155   AST U/L 22   ALT U/L 19   ALK PHOS U/L 76   TOTAL PROTEIN g/dL 7 5   ALBUMIN g/dL 3 2*   TOTAL BILIRUBIN mg/dL 0 74         Results from last 7 days   Lab Units 04/24/22  0619 04/23/22  1155   GLUCOSE RANDOM mg/dL 99 117             No results found for: BETA-HYDROXYBUTYRATE                   Results from last 7 days   Lab Units 04/23/22  1554 04/23/22  1356 04/23/22  1155   HS TNI 0HR ng/L  --   --  11   HS TNI 2HR ng/L  --  14  --    HSTNI D2 ng/L  --  3  --    HS TNI 4HR ng/L 15  --   --    HSTNI D4 ng/L 4  --   --          Results from last 7 days   Lab Units 04/23/22  1155   PROTIME seconds 13 9   INR  1 10   PTT seconds 30         Results from last 7 days   Lab Units 04/24/22  0619 04/23/22  1155   PROCALCITONIN ng/ml 0 10 0 05     Results from last 7 days   Lab Units 04/23/22  1155   LACTIC ACID mmol/L 1 1             Results from last 7 days   Lab Units 04/23/22  1155   NT-PRO BNP pg/mL 992*                             Results from last 7 days   Lab Units 04/23/22  1719   CLARITY UA  Clear   COLOR UA  Yellow   SPEC GRAV UA  1 015   PH UA  7 0   GLUCOSE UA mg/dl Negative   KETONES UA mg/dl Negative   BLOOD UA  Negative   PROTEIN UA mg/dl Negative   NITRITE UA  Negative   BILIRUBIN UA  Negative   UROBILINOGEN UA E U /dl 0 2   LEUKOCYTES UA  Negative     Results from last 7 days   Lab Units 04/23/22  1155   INFLUENZA A PCR  Negative   INFLUENZA B PCR  Negative   RSV PCR  Negative                             Results from last 7 days   Lab Units 04/23/22  1158 04/23/22  1155   BLOOD CULTURE  Received in Microbiology Lab  Culture in Progress  Received in Microbiology Lab  Culture in Progress                 ED Treatment:   Medication Administration from 04/23/2022 1133 to 04/23/2022 1413       Date/Time Order Dose Route Action Action by Comments     04/23/2022 1247 levofloxacin (LEVAQUIN) IVPB (premix in dextrose) 750 mg 150 mL 750 mg Intravenous New Bag Valencia Gore RN         Past Medical History:   Diagnosis Date    Acute metabolic encephalopathy 0/85/2747    Appendicolith     Ascending aortic aneurysm ([facility])     3 7    Asthma     BPH (benign prostatic hyperplasia)     CAD (coronary artery disease)     noted on CT scan    COPD (chronic obstructive pulmonary disease) ([facility])     Descending [facility] ([facility] )     Diverticulosis     Former tobacco use     GERD (gastroesophageal reflux disease)     History of DVT (deep vein thrombosis)     Left leg    History of transfusion     Hypertension     Inguinal hernia     right    Nephrolithiasis     Oxygen dependent     2LNC    Prostate calculus     PVD (peripheral vascular disease) ([facility])     Ulcer     Varicose vein of leg     b/l     Present on Admission:   Hyperlipidemia   COPD (chronic obstructive pulmonary disease) ([facility])   Benign essential hypertension      Admitting Diagnosis: Pneumonia [J18 9]  Acute-on-chronic respiratory failure ([facility]) [J96 20]  Fever [R50 9]  Age/Sex: 82 y o  male     Admission Orders:    Frequent pulse ox checks;   Supplemental oxygen prn (wean as tolerated)  Nebs prn;  Elevate HOB - aspiration precautions;  scd's ;  PT/OT;  Hourly inc spirometry    Scheduled Medications:  amLODIPine, 5 mg, Oral, Daily  aspirin, 81 mg, Oral, Daily  atorvastatin, 10 mg, Oral, Daily  budesonide-formoterol, 2 puff, Inhalation, BID  enoxaparin, 40 mg, Subcutaneous, Daily  ferrous sulfate, 325 mg, Oral, Daily With Breakfast  furosemide, 10 mg, Oral, Daily  guaiFENesin, 600 mg, Oral, BID  levofloxacin, 500 mg, Intravenous, Q24H  melatonin, 6 mg, Oral, HS  metoprolol tartrate, 50 mg, Oral, Q12H KIKE  pantoprazole, 40 mg, Oral, Daily  umeclidinium bromide, 1 puff, Inhalation, Daily  vancomycin, 125 mg, Oral, Q12H KIKE      Continuous IV Infusions:     PRN Meds:  acetaminophen, 650 mg, Oral, Q6H PRN  albuterol, 2 5 mg, Nebulization, Q6H PRN  aluminum-magnesium hydroxide-simethicone, 30 mL, Oral, Q6H PRN  ondansetron, 4 mg, Intravenous, Q6H PRN  sodium chloride (PF), 3 mL, Intravenous, Q1H PRN        IP CONSULT TO CASE MANAGEMENT    Network Utilization Review Department  ATTENTION: Please call with any questions or concerns to [phone] and carefully listen to the prompts so that you are directed to the right person  All voicemails are confidential   Emma Adama all requests for admission clinical reviews, approved or denied determinations and any other requests to dedicated fax number below belonging to the campus where the patient is receiving treatment   List of dedicated fax numbers for the Facilities:  [address] DENIALS (Administrative/Medical Necessity) [phone]   [address] (Maternity/NICU/Pediatrics) [phone]   [address]  [address] [address] [facility] [place] [address] [phone] [place] [address] [address] Rob[address] [facility] [place] [address] [phone]

## 2022-04-24 NOTE — ASSESSMENT & PLAN NOTE
Wt Readings from Last 3 Encounters:   04/23/22 69 7 kg (153 lb 10 6 oz)   03/17/22 62 2 kg (137 lb 2 oz)   11/09/21 67 kg (147 lb 12 8 oz)     Patient with a history of diastolic heart failure  During his last admission was discharged on 10 mg of Lasix daily  Has presented back with worsening shortness of breath  Weight gain of 15 lb over the past month  Likely dietary indiscretion  Continue lasix IV 20 mg daily    Will continue to monitor daily weight, input output  Patient feels better with improving shortness of breath  Denies orthopnea paroxysmal nocturnal dyspnea

## 2022-04-24 NOTE — PLAN OF CARE
Problem: MOBILITY - ADULT  Goal: Maintain or return to baseline ADL function  Description: INTERVENTIONS:  -  Assess patient's ability to carry out ADLs; assess patient's baseline for ADL function and identify physical deficits which impact ability to perform ADLs (bathing, care of mouth/teeth, toileting, grooming, dressing, etc )  - Assess/evaluate cause of self-care deficits   - Assess range of motion  - Assess patient's mobility; develop plan if impaired  - Assess patient's need for assistive devices and provide as appropriate  - Encourage maximum independence but intervene and supervise when necessary  - Involve family in performance of ADLs  - Assess for home care needs following discharge   - Consider OT consult to assist with ADL evaluation and planning for discharge  - Provide patient education as appropriate  Outcome: Progressing  Goal: Maintains/Returns to pre admission functional level  Description: INTERVENTIONS:  - Perform BMAT or MOVE assessment daily    - Set and communicate daily mobility goal to care team and patient/family/caregiver  - Collaborate with rehabilitation services on mobility goals if consulted  - Perform Range of Motion 3 times a day  - Reposition patient every 2 hours    - Dangle patient 3 times a day  - Stand patient 3 times a day  - Ambulate patient 3 times a day  - Out of bed to chair 3 times a day   - Out of bed for meals *3** times a day  - Out of bed for toileting  - Record patient progress and toleration of activity level   Outcome: Progressing     Problem: Potential for Falls  Goal: Patient will remain free of falls  Description: INTERVENTIONS:  - Educate patient/family on patient safety including physical limitations  - Instruct patient to call for assistance with activity   - Consult OT/PT to assist with strengthening/mobility   - Keep Call bell within reach  - Keep bed low and locked with side rails adjusted as appropriate  - Keep care items and personal belongings within reach  - Initiate and maintain comfort rounds  - Make Fall Risk Sign visible to staff  - Offer Toileting every 2 Hours, in advance of need  - Initiate/Maintain bwedalarm  -   - Apply yellow socks and bracelet for high fall risk patients  - Consider moving patient to room near nurses station  Outcome: Progressing     Problem: PAIN - ADULT  Goal: Verbalizes/displays adequate comfort level or baseline comfort level  Description: Interventions:  - Encourage patient to monitor pain and request assistance  - Assess pain using appropriate pain scale  - Administer analgesics based on type and severity of pain and evaluate response  - Implement non-pharmacological measures as appropriate and evaluate response  - Consider cultural and social influences on pain and pain management  - Notify physician/advanced practitioner if interventions unsuccessful or patient reports new pain  Outcome: Progressing     Problem: INFECTION - ADULT  Goal: Absence or prevention of progression during hospitalization  Description: INTERVENTIONS:  - Assess and monitor for signs and symptoms of infection  - Monitor lab/diagnostic results  - Monitor all insertion sites, i e  indwelling lines, tubes, and drains  - Monitor endotracheal if appropriate and nasal secretions for changes in amount and color  - [place] appropriate cooling/warming therapies per order  - Administer medications as ordered  - Instruct and encourage patient and family to use good hand hygiene technique  - Identify and instruct in appropriate isolation precautions for identified infection/condition  Outcome: Progressing     Problem: RESPIRATORY - ADULT  Goal: Achieves optimal ventilation and oxygenation  Description: INTERVENTIONS:  - Assess for changes in respiratory status  - Assess for changes in mentation and behavior  - Position to facilitate oxygenation and minimize respiratory effort  - Oxygen administered by appropriate delivery if ordered  - Initiate smoking cessation education as indicated  - Encourage broncho-pulmonary hygiene including cough, deep breathe, Incentive Spirometry  - Assess the need for suctioning and aspirate as needed  - Assess and instruct to report SOB or any respiratory difficulty  - Respiratory Therapy support as indicated  Outcome: Progressing     Problem: Prexisting or High Potential for Compromised Skin Integrity  Goal: Skin integrity is maintained or improved  Description: INTERVENTIONS:  - Identify patients at risk for skin breakdown  - Assess and monitor skin integrity  - Assess and monitor nutrition and hydration status  - Monitor labs   - Assess for incontinence   - Turn and reposition patient  - Assist with mobility/ambulation  - Relieve pressure over bony prominences  - Avoid friction and shearing  - Provide appropriate hygiene as needed including keeping skin clean and dry  - Evaluate need for skin moisturizer/barrier cream  - Collaborate with interdisciplinary team   - Patient/family teaching  - Consider wound care consult   Outcome: Progressing

## 2022-04-24 NOTE — PLAN OF CARE
Problem: PHYSICAL THERAPY ADULT  Goal: Performs mobility at highest level of function for planned discharge setting  See evaluation for individualized goals  Description: Treatment/Interventions: Functional transfer training,LE strengthening/ROM,Therapeutic exercise,Elevations,Cognitive reorientation,Patient/family training,Equipment eval/education,Bed mobility,Gait training,Continued evaluation,Spoke to nursing,OT          See flowsheet documentation for full assessment, interventions and recommendations  4/24/2022 1148 by Leon Drew PT  Note: Prognosis: Fair  Problem List: Decreased strength,Impaired balance,Decreased mobility,Decreased cognition,Decreased safety awareness,Impaired judgement,Impaired vision  Assessment: Nghia Ruth is a 82 y o  male admitted to [facility] on 4/23/2022 for Pneumonia  PT was consulted and pt was seen on 4/24/2022 for mobility assessment and d/c planning  Pt presents w high fall risk, aspiration precautions, Greenlandic speaking only, multiple lines  Pt is currently functioning at a supervision assistance x1 level for bed mobility and transfers, CGA for ambulation w no AD  Pt demonstrated no gross LOB ambulating without external support  Did require assistance for oxygen lines and increased oxygen compared to baseline  No obvious deficits of endurance noted during walking w oxygen recorded as 88-91% on 3L  Pt will benefit from continued skilled IP PT to address the above mentioned impairments  in order to maximize recovery and increase functional independence when completing mobility and ADLs  At this time PT recommendations for d/c are home w HHPT  Barriers to Discharge: None,Inaccessible home environment  Barriers to Discharge Comments: may benefit from stair trial prior to dc     PT Discharge Recommendation: Home with home health rehabilitation          See flowsheet documentation for full assessment       4/24/2022 1148 by Leon Drew PT  Note: Prognosis: Fair  Problem List: Decreased strength,Impaired balance,Decreased mobility,Decreased cognition,Decreased safety awareness,Impaired judgement,Impaired vision  Assessment: Markel Vargas is a 82 y o  male admitted to [facility] on 4/23/2022 for Pneumonia  PT was consulted and pt was seen on 4/24/2022 for mobility assessment and d/c planning  Pt presents w high fall risk, aspiration precautions, Hebrew speaking only, multiple lines  Pt is currently functioning at a supervision assistance  level for bed mobility and transfers, CGA for ambulation w no AD  Pt demonstrated no gross LOB ambulating without external support  Did require assistance for oxygen lines and increased oxygen compared to baseline  No obvious deficits of endurance noted during walking w oxygen recorded as 88-91% on 3L  Pt will benefit from continued skilled IP PT to address the above mentioned impairments  in order to maximize recovery and increase functional independence when completing mobility and ADLs  At this time PT recommendations for d/c are home w HHPT  Barriers to Discharge: None,Inaccessible home environment  Barriers to Discharge Comments: may benefit from stair trial prior to dc     PT Discharge Recommendation: Home with home health rehabilitation          See flowsheet documentation for full assessment

## 2022-04-24 NOTE — OCCUPATIONAL THERAPY NOTE
Occupational Therapy Evaluation     Patient Name: Nithin Burton  LGFZO'G Date: 4/24/2022  Problem List  Principal Problem:    Pneumonia  Active Problems:    COPD (chronic obstructive pulmonary disease) ([facility])    Benign essential hypertension    Hyperlipidemia    CHF (congestive heart failure) ([facility] )    Past Medical History  Past Medical History:   Diagnosis Date    Acute metabolic encephalopathy 1/95/4692    Appendicolith     Ascending aortic aneurysm ([facility])     3 7    Asthma     BPH (benign prostatic hyperplasia)     CAD (coronary artery disease)     noted on CT scan    COPD (chronic obstructive pulmonary disease) ([facility])     Descending thoracic aortic aneurysm ([facility] )     Diverticulosis     Former tobacco use     GERD (gastroesophageal reflux disease)     History of DVT (deep vein thrombosis)     Left leg    History of transfusion     Hypertension     Inguinal hernia     right    Nephrolithiasis     Oxygen dependent     2LNC    Prostate calculus     PVD (peripheral vascular disease) ([facility])     Ulcer     Varicose vein of leg     b/l     Past Surgical History  Past Surgical History:   Procedure Laterality Date    CARDIAC SURGERY      ESOPHAGOGASTRODUODENOSCOPY      HERNIA REPAIR Right 1/21/2019    Procedure: REPAIR HERNIA INGUINAL WITH MESH;  Surgeon: Adelina An MD;  [facility] MAIN OR;  Service: General    INGUINAL HERNIA REPAIR Bilateral     IR TEVAR  12/27/2018    [place] ENDOVASC TAA REINCL SUBCL N/A 12/27/2018    Procedure: TEVAR - endovascular thoracic aortic aneurysm repair;  Surgeon: Aurora Ortega MD;  Location:  MAIN OR;  Service: Vascular    THORACIC AORTIC ANEURYSM REPAIR  12/27/2018    VARICOSE VEIN SURGERY Bilateral     vein stripping           04/24/22 0901   OT Last Visit   OT Visit Date 04/24/22   Note Type   Note type Evaluation   Restrictions/Precautions   Weight Bearing Precautions Per Order No   Other Precautions Cognitive; Chair Alarm; Bed Alarm;Multiple lines; O2;Fall Risk;Aspiration  (3L O2, rg)   Pain Assessment   Pain Assessment Tool 0-10   Pain Score No Pain   Home Living   Type of Home Apartment   Home Layout One level;Stairs to enter with rails  ([address])   Bathroom Shower/Tub Walk-in shower   Bathroom Toilet Standard   Bathroom Equipment Grab bars in shower; Shower chair   Bathroom Accessibility Accessible   Home Equipment Walker;Cane;Other (Comment)  (Home O2- 2L)   Additional Comments Pt lives with spouse in a one level apt with [address]  (-) home alone, son stays w/ pt if pt's spouse is not home  Prior Function   Level of [place] Needs assistance with ADLs and functional mobility; Needs assistance with IADLs   Lives With Spouse   Receives Help From Family   ADL Assistance Needs assistance  (pt reports assist w/ bathing only)   IADLs Needs assistance   Falls in the last 6 months 0   Vocational Retired   Comments At baseline, pt reports I w/ ADLs (assist w/ bathing only) and functional transfers/mobility w/ use of SPC  Family performs IADLs  (-)   Denies falls PTA  Lifestyle   Autonomy At baseline, pt reports I w/ ADLs (assist w/ bathing only) and functional transfers/mobility w/ use of SPC  Family performs IADLs  (-)   Denies falls PTA  Reciprocal Relationships Spouse, son   Service to Others Retired   Intrinsic Gratification Watching TV   Psychosocial   Psychosocial ([address]) WDL   ADL   Where Assessed Chair   Eating Assistance 7  Independent   Grooming Assistance 5  [address] [facility] [address]  [address]  [address] 5  [address] 4  [address] 4  Minimal Assistance   Bed Mobility   Supine to Sit 5  Supervision   Additional items HOB elevated; Bedrails; Increased time required;Verbal cues   Sit to Supine Unable to assess   Additional Comments Pt seated OOB in chair with chair alarm activated at end of session  Call bell and phone within reach  All needs met and pt reports no further questions for OT at this time  Transfers   Sit to Stand 5  Supervision   Additional items Bedrails; Increased time required;Verbal cues   Stand to Sit 5  Supervision   Additional items Armrests; Increased time required;Verbal cues   Additional Comments Cues for safe technique and hand placement   Functional Mobility   Functional Mobility 4  Minimal assistance   Additional Comments CGA-Close supervision w/o use of AD  SpO2: 88-91% on 3L O2   Balance   Static Sitting Fair   Dynamic Sitting Fair -   Static Standing Fair -   Dynamic Standing Poor +   Ambulatory Poor +   Activity Tolerance   Activity Tolerance Patient tolerated treatment well;Patient limited by fatigue   Medical Staff [facility], PT   Nurse Made Aware yes; Sandra Morales RN   RUE Assessment   RUE Assessment WFL   RUE Strength   RUE Overall Strength Within Functional Limits - able to perform ADL tasks with strength  (4-/5 throughout)   LUE Assessment   LUE Assessment WFL   LUE Strength   LUE Overall Strength Within Functional Limits - able to perform ADL tasks with strength  (4-/5 throughout)   Hand Function   Gross Motor Coordination Functional   Fine Motor Coordination Functional   Sensation   Light Touch No apparent deficits   Proprioception   Proprioception No apparent deficits   Vision-Basic Assessment   Current Vision Wears glasses all the time   Vision - Complex Assessment   Ocular Range of Motion [facility]   Acuity Able to read clock/calendar on wall without difficulty; Able to read employee name badge without difficulty   Perception   Inattention/Neglect Appears intact   Cognition   Overall Cognitive Status Impaired   Arousal/Participation Alert; Cooperative   Attention Attends with cues to redirect   Orientation Level Oriented to person;Oriented to place;Oriented to time  (general to time/place- "[facility], April [facility]")   Memory Decreased recall of precautions;Decreased recall of recent events   Following Commands Follows one step commands with increased time or repetition   Comments Pleasant and cooperative  Ukrainian speaking  Difficulty recalling PLOF  Assessment   Limitation Decreased ADL status; Decreased UE strength;Decreased endurance;Decreased self-care trans;Decreased high-level ADLs   Prognosis Good   Assessment Pt is a 82 y o  male seen for OT evaluation s/p adm to [facility] on 4/23/2022 w/ SOB, cough, and fever and admitted w/ Pneumonia   Comorbidities affecting pts functional performance include a significant PMH of Asthma, BPH, CAD, CHF, COPD, HTN, PVD  Pt with active OT orders and activity orders for Up and OOB as tolerated   Pt lives with spouse in a one level apt with [address]  (-) home alone, son stays w/ pt if pt's spouse is not home  At baseline, pt reports I w/ ADLs (assist w/ bathing only) and functional transfers/mobility w/ use of SPC  Family performs IADLs  (-)   Denies falls PTA  Upon evaluation, pt currently requires Supervision for UB ADLs, Min A for LB ADLs, Min A for toileting, Supervision for bed mobility, Supervision for transfers, and CGA-Close Supervision for functional mobility 2* the following deficits impacting occupational performance: decreased strength, decreased balance, decreased tolerance, impaired memory and impaired problem solving  These impairments, as well at pts fall risk, increase in O2 requirements, steps to enter environment, difficulty performing ADLS and limited insight into deficits limit pts ability to safely engage in all baseline areas of occupation  Based on the aforementioned OT evaluation, functional performance deficits, and assessments, pt has been identified as a Moderate complexity evaluation   Pt to continue to benefit from continued acute OT services during hospital stay to address defined deficits and to maximize level of functional independence in the following Occupational Performance areas: grooming, bathing/shower, toilet hygiene, dressing, medication management, health maintenance, functional mobility, community mobility and clothing management  From OT standpoint, recommend Home OT upon D/C  OT will continue to follow pt 2-3x/wk to address the following goals to  w/in 10-14 days:   Goals   LTG Time Frame 10-14   Long Term Goal Please refer to LTGs listed below   Plan   Treatment Interventions ADL retraining;Functional transfer training;UE strengthening/ROM; Endurance training;Patient/family training;Equipment evaluation/education; Compensatory technique education; Activityengagement; Energy conservation   Goal Expiration Date 22   OT Treatment Day 0   OT Frequency 2-3x/wk   Recommendation   OT Discharge Recommendation Home with home health rehabilitation   OT - OK to Discharge Yes  (when medically cleared)   Additional Comments  The patient's raw score on the AM-PAC Daily Activity inpatient short form is 19, standardized score is 40 22, greater than 39 4  Patients at this level are likely to benefit from discharge to home  Please refer to the recommendation of the Occupational Therapist for safe discharge planning  AM-PAC Daily Activity Inpatient   Lower Body Dressing 3   Bathing 3   Toileting 3   Upper Body Dressing 3   Grooming 3   Eating 4   Daily Activity Raw Score 19   Daily Activity Standardized Score (Calc for Raw Score >=11) 40 22   AM-PAC Applied Cognition Inpatient   Following a Speech/Presentation 3   Understanding Ordinary Conversation 4   Taking Medications 3   Remembering Where Things Are Placed or Put Away 3   Remembering List of 4-5 Errands 3   Taking Care of Complicated Tasks 3   Applied Cognition Raw Score 19   Applied Cognition Standardized Score 39 77      GOALS    1  Pt will improve activity tolerance to G for min 30 min txment sessions for increase engagement in functional tasks    2   Pt will complete bed mobility at a Mod I level w/ G balance/safety demonstrated to decrease caregiver assistance required     3  Pt will complete UB dressing/self care w/ mod I using adaptive device and DME as needed     4  Pt will complete LB dressing/self care w/ mod I using adaptive device and DME as needed    5  Pt will complete toileting w/ mod I w/ G hygiene/thoroughness using DME as needed    6  Pt will improve functional transfers to Mod I on/off all surfaces using DME as needed w/ G balance/safety     7  Pt will improve functional mobility during ADL/IADL/leisure tasks to Mod I using DME as needed w/ G balance/safety     8  Pt will be attentive 100% of the time during ongoing cognitive assessment w/ G participation to assist w/ safe d/c planning/recommendations    9  Pt will demonstrate G carryover of pt/caregiver education and training as appropriate w/o cues w/ good tolerance to increase safety during functional tasks    10  Pt will demonstrate 100% carryover of energy conservation techniques t/o functional I/ADL/leisure tasks w/o cues s/p skilled education to increase endurance during functional tasks    11  Pt will increase BUE strength by 1MM grade via AROM exercises to increase independence in ADLs and transfers    12  Pt will verbalize 3 potential fall hazards and identify appropriate compensatory techniques to decrease fall risk in home environment     13   Pt will increase standing tolerance to 8-10 mins with Fair+ dynamic standing balance to increase safety during participation in ADLs         Georgina Vazquez, OTR/L

## 2022-04-24 NOTE — PHYSICAL THERAPY NOTE
PHYSICAL THERAPY EVALUATION          Patient Name: Cy CROSSPUTERRY Date: 4/24/2022   PT EVALUATION    82 y o     [id number]    Pneumonia [J18 9]  Acute-on-chronic respiratory failure (HCC) [J96 20]  Fever [R50 9]    Past Medical History:   Diagnosis Date    Acute metabolic encephalopathy 3/27/8999    Appendicolith     Ascending aortic aneurysm (HCC)     3 7    Asthma     BPH (benign prostatic hyperplasia)     CAD (coronary artery disease)     noted on CT scan    COPD (chronic obstructive pulmonary disease) (HCC)     Descending thoracic aortic aneurysm ([address] )     Diverticulosis     Former tobacco use     GERD (gastroesophageal reflux disease)     History of DVT (deep vein thrombosis)     Left leg    History of transfusion     Hypertension     Inguinal hernia     right    Nephrolithiasis     Oxygen dependent     2LNC    Prostate calculus     PVD (peripheral vascular disease) (HCC)     Ulcer     Varicose vein of leg     b/l     Past Surgical History:   Procedure Laterality Date    CARDIAC SURGERY      ESOPHAGOGASTRODUODENOSCOPY      HERNIA REPAIR Right 1/21/2019    Procedure: REPAIR HERNIA INGUINAL WITH MESH;  Surgeon: Fabian Girard MD;  Location: [address] [place] [address] MAIN OR;  Service: General    INGUINAL HERNIA REPAIR Bilateral     IR TEVAR  12/27/2018    VA ENDOVASC TAA REINCL SUBCL N/A 12/27/2018    Procedure: TEVAR - endovascular thoracic aortic aneurysm repair;  Surgeon: Marck Ortega MD;  Location:  MAIN OR;  Service: Vascular    THORACIC AORTIC ANEURYSM REPAIR  12/27/2018    VARICOSE VEIN SURGERY Bilateral     vein stripping        04/24/22 0900   PT Last Visit   PT Visit Date 04/24/22   Note Type   Note type Evaluation   Pain Assessment   Pain Assessment Tool 0-10   Pain Score No Pain   Restrictions/Precautions   Other Precautions Cognitive; Chair Alarm; Bed Alarm;Multiple lines;Aspiration;O2;Fall Risk  (3L, rg)   Home Living   Type of Home [address] One level;Stairs to enter with rails   Bathroom Shower/Tub Walk-in shower   Bathroom Toilet Standard   Bathroom Equipment Shower chair   Home Equipment Walker;Cane;Other (Comment)  (2L O2)   Additional Comments FOS (13) to enter   Prior Function   Level of [place] Independent with ADLs and functional mobility; Needs assistance with ADLs and functional mobility; Needs assistance with IADLs   Lives With Spouse   Receives Help From [facility] in the last 6 months 0   Comments per patient, ambulate w SPC at home  wife helps w ADLs prn, specifically bathing  unclear if patient has been recieving therapy at home since last admission   General   Additional Pertinent History pt admitted 4/23/22 for pna  up and oob orders  pmhx significant for asthma, CAD, COPD, PVD  prev admitted [email or web address] from 3/12-17/22 for CHF, dc home w HHPT  Luxembourgish speaking, poor historian   Family/Caregiver Present No   Cognition   Overall Cognitive Status Impaired   Arousal/Participation Cooperative   Orientation Level Oriented to person;Oriented to place;Oriented to time  (general to time, place (hospital, April 25))   Memory Decreased short term memory;Decreased recall of recent events   Following Commands Follows one step commands with increased time or repetition   RLE Assessment   RLE Assessment WFL   LLE Assessment   LLE Assessment WFL   Bed Mobility   Supine to Sit 5  Supervision   Additional items HOB elevated; Bedrails; Increased time required   Transfers   Sit to Stand 5  Supervision   Additional items Bedrails; Increased time required   Stand to Sit 5  Supervision   Additional items Armrests; Increased time required   Ambulation/Elevation   Gait pattern Improper Weight shift; Short stride; Excessively slow   Gait Assistance 5  Supervision  (CGA)   Additional items Assist x 1   Assistive Device None   Distance 25'   Balance   Static Standing Fair -   Dynamic Standing Poor +   Ambulatory Poor +   Endurance Deficit Endurance Deficit   (O2 88-91% on 3L)   Activity Tolerance   Activity Tolerance Patient tolerated treatment well   Medical Staff [address] OT   Nurse Made Aware Teagan PAYAN   Assessment   Prognosis Fair   Problem List Decreased strength; Impaired balance;Decreased mobility; Decreased cognition;Decreased safety awareness; Impaired judgement; Impaired vision   Assessment Martha Jay is a 82 y o  male admitted to [facility] on 4/23/2022 for Pneumonia  PT was consulted and pt was seen on 4/24/2022 for mobility assessment and d/c planning  Pt presents w high fall risk, aspiration precautions, Welsh speaking only, multiple lines  Pt is currently functioning at a supervision assistance x1 level for bed mobility and transfers, CGA for ambulation w no AD  Pt demonstrated no gross LOB ambulating without external support  Did require assistance for oxygen lines and increased oxygen compared to baseline  No obvious deficits of endurance noted during walking w oxygen recorded as 88-91% on 3L  Pt will benefit from continued skilled IP PT to address the above mentioned impairments  in order to maximize recovery and increase functional independence when completing mobility and ADLs  At this time PT recommendations for d/c are home w HHPT  Barriers to Discharge None; Inaccessible home environment   Barriers to Discharge Comments may benefit from stair trial prior to dc   Goals   STG Expiration Date 05/08/22   Short Term Goal #1 1)  Pt will perform bed mobility with Kaitlynn demonstrating appropriate technique 100% of the time in order to improve function  2)  Perform all transfers with Kaitlynn demonstrating safe and appropriate technique 100% of the time in order to improve ability to negotiate safely in home environment  3) Amb with least restrictive AD > 50'x1 with mod I in order to demonstrate ability to negotiate in home environment  4)  Improve overall strength and balance 1/2 grade in order to optimize ability to perform functional tasks and reduce fall risk  5) Increase activity tolerance to 45 minutes in order to improve endurance to functional tasks  6)  Negotiate stairs using most appropriate technique and S in order to be able to negotiate safely in home environment  7) PT for ongoing patient and family/caregiver education, DME needs and d/c planning in order to promote highest level of function in least restrictive environment  Plan   Treatment/Interventions Functional transfer training;LE strengthening/ROM; Therapeutic exercise;Elevations;Cognitive reorientation;Patient/family training;Equipment eval/education; Bed mobility;Gait training;Continued evaluation;Spoke to nursing;OT   PT Frequency 2-3x/wk   Recommendation   PT Discharge Recommendation Home with home health rehabilitation   Additional Comments The patient's AM-PAC Basic Mobility Inpatient Short Form Raw Score is 18  A Raw score of greater than 16 suggests the patient may benefit from discharge to home  Please also refer to the recommendation of the Physical Therapist for safe discharge planning  AM-PAC Basic Mobility Inpatient   Turning in Bed Without Bedrails 3   Lying on Back to Sitting on Edge of Flat Bed 3   Moving Bed to Chair 3   Standing Up From Chair 3   Walk in Room 3   Climb 3-5 Stairs 3   Basic Mobility Inpatient Raw Score 18   Basic Mobility Standardized Score 41 05   Highest Level Of Mobility   JH-HLM Goal 6: Walk 10 steps or more   JH-HLM Highest Level of Mobility 7: Walk 25 feet or more   JH-HLM Goal Achieved Yes   End of Consult   Patient Position at End of Consult Bedside chair;Bed/Chair alarm activated; All needs within reach   History: co - morbidities, social background, past experience, fall risk, use of assistive device, assist for adl's, cognition, multiple lines  Exam: impairments in systems including musculoskeletal (strength), neuromuscular (balance, gait, transfers, motor function), am-pac, cardiopulmonary, cognition  Clinical: unstable/unpredictable  Complexity:high      Earlean Outlaw, PT

## 2022-04-24 NOTE — PLAN OF CARE
Problem: OCCUPATIONAL THERAPY ADULT  Goal: Performs self-care activities at highest level of function for planned discharge setting  See evaluation for individualized goals  Description: Treatment Interventions: ADL retraining,Functional transfer training,UE strengthening/ROM,Endurance training,Patient/family training,Equipment evaluation/education,Compensatory technique education,Activityengagement,Energy conservation          See flowsheet documentation for full assessment, interventions and recommendations  Note: Limitation: Decreased ADL status,Decreased UE strength,Decreased endurance,Decreased self-care trans,Decreased high-level ADLs  Prognosis: Good  Assessment: Pt is a 82 y o  male seen for OT evaluation s/p adm to [facility] on 4/23/2022 w/ SOB, cough, and fever and admitted w/ Pneumonia   Comorbidities affecting pts functional performance include a significant PMH of Asthma, BPH, CAD, CHF, COPD, HTN, PVD  Pt with active OT orders and activity orders for Up and OOB as tolerated   Pt lives with spouse in a one level apt with [address]  (-) home alone, son stays w/ pt if pt's spouse is not home  At baseline, pt reports I w/ ADLs (assist w/ bathing only) and functional transfers/mobility w/ use of SPC  Family performs IADLs  (-)   Denies falls PTA  Upon evaluation, pt currently requires Supervision for UB ADLs, Min A for LB ADLs, Min A for toileting, Supervision for bed mobility, Supervision for transfers, and CGA-Close Supervision for functional mobility 2* the following deficits impacting occupational performance: decreased strength, decreased balance, decreased tolerance, impaired memory and impaired problem solving  These impairments, as well at pts fall risk, increase in O2 requirements, steps to enter environment, difficulty performing ADLS and limited insight into deficits limit pts ability to safely engage in all baseline areas of occupation   Based on the aforementioned OT evaluation, functional performance deficits, and assessments, pt has been identified as a Moderate complexity evaluation  Pt to continue to benefit from continued acute OT services during hospital stay to address defined deficits and to maximize level of functional independence in the following Occupational Performance areas: grooming, bathing/shower, toilet hygiene, dressing, medication management, health maintenance, functional mobility, community mobility and clothing management  From OT standpoint, recommend Home OT upon D/C   OT will continue to follow pt 2-3x/wk to address the following goals to  w/in 10-14 days:     OT Discharge Recommendation: Home with home health rehabilitation  OT - OK to Discharge: Yes (when medically cleared)

## 2022-04-24 NOTE — UTILIZATION REVIEW
Inpatient Admission Authorization Request   NOTIFICATION OF INPATIENT ADMISSION/INPATIENT AUTHORIZATION REQUEST   SERVICING FACILITY:   [address] [place] [address], [place] [address] [address] [facility]  Tax ID: [id number]  NPI: [id number]  Place of Service: Inpatient [facility]  [address]  Place of Service Code: 24     ATTENDING PROVIDER:  Attending Name and NPI#: Aicha Owens Md [[id number]]  Address: [address] [place] [address], [place] [address] [address] [facility]  Phone: [phone]     UTILIZATION REVIEW CONTACT:  Karthik Rajput Utilization   Network Utilization Review Department  Phone: [phone]  Fax: [phone]  Email: [email or web address]     PHYSICIAN ADVISORY SERVICES:  FOR [id number] REVIEW - MEDICAL NECESSITY DENIAL  Phone: [phone]  Fax: [phone]  Email: [email or web address]     TYPE OF REQUEST:  Inpatient Status     ADMISSION INFORMATION:  ADMISSION DATE/TIME: 4/23/22  1:28 PM  PATIENT DIAGNOSIS CODE/DESCRIPTION:  Pneumonia [J18 9]  Acute-on-chronic respiratory failure ([address] ) [J96 20]  Fever [R50 9]  DISCHARGE DATE/TIME: No discharge date for patient encounter  IMPORTANT INFORMATION:  Please contact the Karthik Rajput directly with any questions or concerns regarding this request  Department voicemails are confidential     Send requests for admission clinical reviews, concurrent reviews, approvals, and administrative denials due to lack of clinical to fax [phone]

## 2022-04-24 NOTE — ASSESSMENT & PLAN NOTE
Presents to ED with 2 day hx of increased SOB/cough/fever  Had left lower lobe infiltrate fluid versus infiltrate  Was initiated on antibiotics given this finding  However procalcitonin 0 10  Will discontinue antibiotics    Likely viral versus fluid    On chronic O2 at home, increased O2 requirements in ED-this is now back to baseline  Covid/flu negative

## 2022-04-24 NOTE — PROGRESS NOTES
[address]  Progress Note Carlos Enrique Wang [birth year], 82 y o  male MRN: [id number]  Unit/Bed#: [address] 222-01 Encounter: [id number]  Primary Care Provider: Rell Mccormick MD   Date and time admitted to hospital: 4/23/2022 11:34 AM    CHF (congestive heart failure) ([facility])-acute  Assessment & Plan  Wt Readings from Last 3 Encounters:   04/23/22 69 7 kg (153 lb 10 6 oz)   03/17/22 62 2 kg (137 lb 2 oz)   11/09/21 67 kg (147 lb 12 8 oz)     Patient with a history of chronic diastolic heart failure  During his last admission was discharged on 10 mg of Lasix daily  Has presented back with worsening shortness of breath  Now with acute on chronic diastolic heart failure  Weight gain of 15 lb over the past month  Likely dietary indiscretion  Continue lasix IV 20 mg daily    Will continue to monitor daily weight, input output  Patient feels better with improving shortness of breath  Denies orthopnea paroxysmal nocturnal dyspnea  * Pneumonia  Assessment & Plan    Presents to ED with 2 day hx of increased SOB/cough/fever  Had left lower lobe infiltrate fluid versus infiltrate  Was initiated on antibiotics given this finding  However procalcitonin 0 10  Will discontinue antibiotics  Likely viral versus fluid    On chronic O2 at home, increased O2 requirements in ED-this is now back to baseline  Covid/flu negative      COPD (chronic obstructive pulmonary disease) ([facility])  Assessment & Plan  No acute exacerbation  Continue nebs/inhalers    Benign essential hypertension  Assessment & Plan  Continue home metoprolol, norvasc    Hyperlipidemia  Assessment & Plan  Continue statin        Subjective:  Guinean-speaking gentleman  Admitted with cough, fever, shortness of breath  Had an infiltrate left lower lobe however with normal procalcitonin likely to be viral   Antibiotics discontinued  BNP elevated  Will treat with IV Lasix 20 mg daily for fluid overload    Was on 8 mg p o  Lasix at home  If stable anticipate discharge home tomorrow    Physical Exam:   Vitals: Blood pressure 168/79, pulse 59, temperature 98 9 °F (37 2 °C), resp  rate 17, weight 69 7 kg (153 lb 10 6 oz), SpO2 91 %  ,Body mass index is 25 57 kg/m²  Gen:  Pleasant, non-tachypnic, non-dyspnic  Conversant  Heart: regular rate and rhythm, S1S2 present, no murmur, rub or gallop  Lungs:  Scattered bibasilar rales  Abd: soft, non-tender, non-distended  NABS, no guarding, rebound or peritoneal signs  Extremities: no clubbing, cyanosis or edema  2+pedal pulses bilaterally  Full range of motion  Neuro: awake, alert and oriented  Cranial nerves 2-12 intact  Strength and sensation grossly intact  Skin: warm and dry: no petechiae, purpura and rash      LABS:   Results from last 7 days   Lab Units 04/24/22  0619 04/23/22  1155   WBC Thousand/uL 7 88 8 18   HEMOGLOBIN g/dL 12 8 12 5   HEMATOCRIT % 45 0 43 9   PLATELETS Thousands/uL 133* 135*     Results from last 7 days   Lab Units 04/24/22  0619 04/23/22  1155   POTASSIUM mmol/L 3 9 4 2   CHLORIDE mmol/L 100 104   CO2 mmol/L 38* 38*   BUN mg/dL 16 19   CREATININE mg/dL 0 91 0 99   CALCIUM mg/dL 8 9 8 8       Intake/Output Summary (Last 24 hours) at 4/24/2022 1509  Last data filed at 4/24/2022 0801  Gross per 24 hour   Intake --   Output 250 ml   Net -250 ml         Current Facility-Administered Medications   Medication Dose Route Frequency Provider Last Rate    acetaminophen  650 mg Oral Q6H PRN Roselie Todd, PA-C      albuterol  2 5 mg Nebulization Q6H PRN Roselie Todd, PA-C      aluminum-magnesium hydroxide-simethicone  30 mL Oral Q6H PRN Roselie Todd, PA-C      amLODIPine  5 mg Oral Daily Roselie Todd, PA-C      aspirin  81 mg Oral Daily Roselie Todd, PA-C      atorvastatin  10 mg Oral Daily Roselie Todd, PA-C      budesonide-formoterol  2 puff Inhalation BID Roselie Todd, PA-C      enoxaparin  40 mg Subcutaneous Daily Humaira Butts JEREMIE Marrufo      ferrous sulfate  325 mg Oral Daily With Breakfast Katlyn Moreira PA-C      furosemide  20 mg Intravenous Daily Madison Ren MD      guaiFENesin  600 mg Oral BID Katlyn Moreira PA-C      melatonin  6 mg Oral HS Katlyn Moreira PA-C      metoprolol tartrate  50 mg Oral Q12H [address] Katlyn Moreira PA-C      ondansetron  4 mg Intravenous Q6H PRN Katlyn Moreira PA-C      pantoprazole  40 mg Oral Daily Katlyn Moreira PA-C      sodium chloride (PF)  3 mL Intravenous Q1H PRN Deepak Arroyo DO      umeclidinium bromide  1 puff Inhalation Daily Katlyn Moreira PA-C

## 2022-04-25 VITALS
OXYGEN SATURATION: 87 % | HEART RATE: 68 BPM | RESPIRATION RATE: 18 BRPM | SYSTOLIC BLOOD PRESSURE: 151 MMHG | WEIGHT: 153.66 LBS | BODY MASS INDEX: 25.57 KG/M2 | TEMPERATURE: 98.8 F | DIASTOLIC BLOOD PRESSURE: 78 MMHG

## 2022-04-25 LAB
ANION GAP SERPL CALCULATED.3IONS-SCNC: 4 MMOL/L (ref 4–13)
BUN SERPL-MCNC: 17 MG/DL (ref 5–25)
CALCIUM SERPL-MCNC: 9.1 MG/DL (ref 8.3–10.1)
CHLORIDE SERPL-SCNC: 96 MMOL/L (ref 100–108)
CO2 SERPL-SCNC: 42 MMOL/L (ref 21–32)
CREAT SERPL-MCNC: 0.91 MG/DL (ref 0.6–1.3)
GFR SERPL CREATININE-BSD FRML MDRD: 77 ML/MIN/1.73SQ M
GLUCOSE SERPL-MCNC: 96 MG/DL (ref 65–140)
NT-PROBNP SERPL-MCNC: 602 PG/ML
POTASSIUM SERPL-SCNC: 3.6 MMOL/L (ref 3.5–5.3)
SODIUM SERPL-SCNC: 142 MMOL/L (ref 136–145)

## 2022-04-25 PROCEDURE — 99239 HOSP IP/OBS DSCHRG MGMT >30: CPT | Performed by: PHYSICIAN ASSISTANT

## 2022-04-25 PROCEDURE — 83880 ASSAY OF NATRIURETIC PEPTIDE: CPT | Performed by: HOSPITALIST

## 2022-04-25 PROCEDURE — 80048 BASIC METABOLIC PNL TOTAL CA: CPT | Performed by: HOSPITALIST

## 2022-04-25 RX ORDER — GUAIFENESIN 600 MG
600 TABLET, EXTENDED RELEASE 12 HR ORAL EVERY 12 HOURS SCHEDULED
Qty: 14 TABLET | Refills: 0 | Status: SHIPPED | OUTPATIENT
Start: 2022-04-25 | End: 2022-05-02

## 2022-04-25 RX ORDER — BENZONATATE 100 MG/1
100 CAPSULE ORAL 3 TIMES DAILY PRN
Qty: 20 CAPSULE | Refills: 0 | Status: SHIPPED | OUTPATIENT
Start: 2022-04-25

## 2022-04-25 RX ADMIN — ENOXAPARIN SODIUM 40 MG: 40 INJECTION SUBCUTANEOUS at 08:36

## 2022-04-25 RX ADMIN — PANTOPRAZOLE SODIUM 40 MG: 40 TABLET, DELAYED RELEASE ORAL at 08:36

## 2022-04-25 RX ADMIN — AMLODIPINE BESYLATE 5 MG: 5 TABLET ORAL at 08:36

## 2022-04-25 RX ADMIN — BUDESONIDE AND FORMOTEROL FUMARATE DIHYDRATE 2 PUFF: 160; 4.5 AEROSOL RESPIRATORY (INHALATION) at 08:35

## 2022-04-25 RX ADMIN — GUAIFENESIN 600 MG: 600 TABLET, EXTENDED RELEASE ORAL at 08:36

## 2022-04-25 RX ADMIN — ATORVASTATIN CALCIUM 10 MG: 10 TABLET, FILM COATED ORAL at 08:35

## 2022-04-25 RX ADMIN — METOPROLOL TARTRATE 50 MG: 50 TABLET, FILM COATED ORAL at 08:35

## 2022-04-25 RX ADMIN — FUROSEMIDE 20 MG: 10 INJECTION, SOLUTION INTRAVENOUS at 08:36

## 2022-04-25 RX ADMIN — UMECLIDINIUM 1 PUFF: 62.5 AEROSOL, POWDER ORAL at 08:35

## 2022-04-25 RX ADMIN — ASPIRIN 81 MG: 81 TABLET, COATED ORAL at 08:35

## 2022-04-25 RX ADMIN — Medication 325 MG: at 08:36

## 2022-04-25 NOTE — NURSING NOTE
AVS reviewed with son who speaks Karolyn Deras, translated into Georgian  Pt confirmed understanding  IV removed  Pt d/c via wheelchair, Rx sent to community pharmacy

## 2022-04-25 NOTE — ASSESSMENT & PLAN NOTE
Wt Readings from Last 3 Encounters:   04/23/22 69 7 kg (153 lb 10 6 oz)   03/17/22 62 2 kg (137 lb 2 oz)   11/09/21 67 kg (147 lb 12 8 oz)     Patient with a history of diastolic heart failure  During his last admission was discharged on 10 mg of Lasix daily  Has presented back with worsening shortness of breath  Weight gain of 15 lb over the past month  Likely dietary indiscretion  Continue PO lasix  Will continue to monitor daily weight, input output  Patient feels better with improving shortness of breath  Denies orthopnea paroxysmal nocturnal dyspnea

## 2022-04-25 NOTE — CASE MANAGEMENT
Case Management Assessment & Discharge Planning Note    Patient name Meng Christianson  [facility]* MRN [id number]  : 1935 Date 2022       Current Admission Date: 2022  Current Admission Diagnosis:Pneumonia   Patient Active Problem List    Diagnosis Date Noted    CHF (congestive heart failure) ([facility] ) 2022    BELL[facility] 2022    Lightheadedness 2022    Fever due to infection 2021    Clostridium difficile infection 2021    Proctitis 2021    Rectal bleeding 2021    Hyperlipidemia 2021    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    Altered mental status     Early satiety 2020    Dysphagia 2020    TIA (transient ischemic attack) 2020    Left leg swelling 2019    Acquired renal cyst of left kidney 2019    Preop exam for internal medicine 2019    Abdominal pain 2019    Incarcerated right inguinal hernia 2019    Acute on chronic diastolic congestive heart failure ([facility] ) 2019    Leukocytosis 2018    Thrombocytopenia ([facility] ) 2018    Hypochloremia 2018    Aneurysm, aorta, thoracic ([facility] )     Thoracic aortic aneurysm without rupture [facility] ) 2018    Edema of both legs 2018    Snoring 2018    Pneumonia 2018    Varicose veins of both lower extremities with pain 2018    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HCC) 2018    Chronic respiratory failure with hypoxia ([facility] ) 2018    Accelerated essential hypertension 2018    COPD (chronic obstructive pulmonary disease) ([facility] ) 2018    Descending aortic aneurysm ([facility] ) 2018    History of DVT (deep vein thrombosis) 2018    Benign essential hypertension 2017    Thoracic aortic aneurysm ([facility] ) 2017      LOS (days): 2  Geometric Mean LOS (GMLOS) (days):   Days to GMLOS: OBJECTIVE:    Risk of Unplanned Readmission Score: 16         Current admission status: Inpatient       Preferred Pharmacy:   [address], [address]  [address] [facility]  [address] [facility] [address]  Phone: [phone] Fax: [phone] 222 [address] [facility] [address]  [address] [address], [facility] [address]  Phone: [phone] Fax: [phone]    Primary Care Provider: Isac Moon MD    [facility] Insurance: [facility] MEDICARE [facility]  Secondary Insurance: [facility]    ASSESSMENT:  Active Health Care Proxies    There are no active Health Care Proxies on file  Advance Directives  Does patient have a [address]?: No  Was patient offered paperwork?: Yes (spouse declined need)  Does patient have Advance Directives?: No  Was patient offered paperwork?: Yes (Spouse declined need)  Primary Contact: Larry Macedo (Spouse)         Readmission Root Cause  30 Day Readmission: Yes  Who directed you to return to the hospital?: Family  Did you understand whom to contact if you had questions or problems?: Yes  Did you get your prescriptions before you left the hospital?: No  Reason[de-identified] Preference for own pharmacy  Were you able to get your prescriptions filled when you left the hospital?: Yes  Did you take your medications as prescribed?: Yes  Were you able to get to your follow-up appointments?: No  Reason[de-identified] Readmitted prior to appointment  During previous admission, was a post-acute recommendation made?: Yes  What post-acute resources were offered?: [address]  Patient was readmitted due to:  Pt previously admitted for CHF now returns with PNA and tx for CHF as well  Action Plan: VNA d/c'd pt 4/18 and it's requested again for CHF educatin and therapy    Patient Information  Admitted from[de-identified] Home  Mental Status: Alert  During Assessment patient was accompanied by: Not accompanied during assessment  Assessment information provided by[de-identified] Spouse  Primary Caregiver: Self  Support Systems: Spouse/significant other  [facility] [address] do you live in?: [address] [facility] entry access options   Select all that apply : Stairs  Number of steps to enter home : 5  Type of Current Residence: 2 story home  Upon entering residence, is there a bedroom on the main floor (no further steps)?: No  A bedroom is located on the following floor levels of residence (select all that apply):: 2nd Floor  Upon entering residence, is there a bathroom on the main floor (no further steps)?: No  Indicate which floors of current residence have a bathroom (select all the apply):: 2nd Floor  Number of steps to 2nd floor from main floor: One Flight  In the last 12 months, was there a time when you were not able to pay the mortgage or rent on time?: No  In the last 12 months, how many places have you lived?: 1  In the last 12 months, was there a time when you did not have a steady place to sleep or slept in a shelter (including now)?: No  Living Arrangements: Lives w/ Spouse/significant other    Activities of Daily Living Prior to Admission  Functional Status: Independent  Completes ADLs independently?: Yes  Ambulates independently?: Yes  Does patient use assisted devices?: Yes  Assisted Devices (DME) used: Straight Cane,Walker  Does patient currently own DME?: Yes  What DME does the patient currently own?: Everton León  Does patient have a history of Outpatient Therapy (PT/OT)?: No  Does patient have a history of [address]?: Yes ([facility] d/cd from service on 4/18 (SN))  Does patient currently have [address]?: No         Patient Information Continued  Income Source: SSI/SSD  Does patient have prescription coverage?: Yes  Within the past 12 months, you worried that your food would run out before you got the money to buy more : Never true  Within the past 12 months, the food you bought just didnt last and you didnt have money to get more : Never true  Does patient have a history of substance abuse?: No  Does patient have a history of Mental Health Diagnosis?: No         Means of Transportation  Means of Transport to Appts[de-identified] Family transport  In the past 12 months, has lack of transportation kept you from medical appointments or from getting medications?: No  In the past 12 months, has lack of transportation kept you from meetings, work, or from getting things needed for daily living?: No        DISCHARGE DETAILS:    Discharge planning discussed with[de-identified] Pt's spouse  Freedom of Choice: Yes  Comments - Freedom of Choice: Agreeable to [facility] (just d/c'd from service)  CM contacted family/caregiver?: Yes  Were Treatment Team discharge recommendations reviewed with patient/caregiver?: Yes  Did patient/caregiver verbalize understanding of patient care needs?: Yes            [facility]         Is the patient interested in [place] at discharge?: Yes  [address] requested[de-identified] Cali Castro Name[de-identified] [address] [place] [address] Provider[de-identified] PCP  [address] Needed[de-identified] Heart Failure Management  Oxygen LPM Ordered (if applicable based on home health services needed):: 3 LPM  Homebound Criteria Met[de-identified] Uses an Assist Device (i e  cane, walker, etc)  Supporting Clincal Findings[de-identified] Limited Endurance,Fatigues Easliy in Short Distances                   Treatment Team Recommendation: Home with [facility]  Discharge Destination Plan[de-identified] Home with [facility]  Transport at Discharge : Auto with designated Bessie Henley by Jodie and Unit #): Marnie Jurado (son)                    IMM Given (Date):: 04/25/22  IMM Given to[de-identified] Family (Earle Brantley (spouse))

## 2022-04-25 NOTE — DISCHARGE SUMMARY
[address]  Discharge- Markel Vargas [birth year], 82 y o  male MRN: [id number]  Unit/Bed#: [address] [address] [id number] Encounter: [id number]  Primary Care Provider: Mary Watts MD   Date and time admitted to hospital: 4/23/2022 11:34 AM    * Pneumonia  Assessment & Plan    Presents to ED with 2 day hx of increased SOB/cough/fever  Had left lower lobe infiltrate fluid versus infiltrate  Was initiated on antibiotics given this finding  However procalcitonin 0 10  Will discontinue antibiotics  Likely viral versus fluid    On chronic O2 at home, increased O2 requirements in ED-this is now back to baseline  Covid/flu negative      CHF (congestive heart failure) ([facility])  Assessment & Plan  Wt Readings from Last 3 Encounters:   04/23/22 69 7 kg (153 lb 10 6 oz)   03/17/22 62 2 kg (137 lb 2 oz)   11/09/21 67 kg (147 lb 12 8 oz)     Patient with a history of diastolic heart failure  During his last admission was discharged on 10 mg of Lasix daily  Has presented back with worsening shortness of breath  Weight gain of 15 lb over the past month  Likely dietary indiscretion  Continue PO lasix  Will continue to monitor daily weight, input output  Patient feels better with improving shortness of breath  Denies orthopnea paroxysmal nocturnal dyspnea  Hyperlipidemia  Assessment & Plan  Continue statin    Benign essential hypertension  Assessment & Plan  · Continue home metoprolol, norvasc    COPD (chronic obstructive pulmonary disease) ([facility])  Assessment & Plan  No acute exacerbation    Continue nebs/inhalers    Medical Problems             Resolved Problems  Date Reviewed: 4/25/2022    None              Discharging Physician / Practitioner: Mikayla Basurto PA-C  PCP: Mary Watts MD  Admission Date:   Admission Orders (From admission, onward)     Ordered        04/23/22 1328  Inpatient Admission  Once                      Discharge Date: 04/25/22    Consultations During Hospital Stay:  · None     Procedures Performed:   · none    Significant Findings / Test Results:   · As above     Incidental Findings:   · None      Test Results Pending at Discharge (will require follow up): · None      Outpatient Tests Requested:  · None     Complications:  None     Reason for Admission: shortness of breath    Hospital Course:   Denise Cedillo is a 82 y o  male patient who originally presented to the hospital on 4/23/2022 due to shortness of breath with possible pneumonia versus CHF exacerbation  Patient had dietary discretion and was placed on IV Lasix 20 mg b i d   Initial chest x-ray did show infiltrate versus if effusion, with negative procalcitonin he only received 1 dose of antibiotics and was monitored off  He improved drastically with IV diuretics, leaning more toward CHF exacerbation and less likely pneumonia  He was weaned down to his chronic O2, 2 liters/minute as of 4/24  Was stable overnight and deemed medically stable for discharge 4/25 to home with home health  Please see above list of diagnoses and related plan for additional information  Condition at Discharge: stable    Discharge Day Visit / Exam:   Subjective:  No overnight events per nursing  Patient is breathing feels good  Stable for discharge  Vitals: Blood Pressure: 151/78 (04/25/22 0737)  Pulse: 68 (04/25/22 0737)  Temperature: 98 8 °F (37 1 °C) (04/25/22 0737)  Temp Source: Oral (04/25/22 0737)  Respirations: 18 (04/25/22 0737)  Weight - Scale: 69 7 kg (153 lb 10 6 oz) (04/23/22 1141)  SpO2: (!) 87 % (04/25/22 0737)  Exam:   Physical Exam  Vitals and nursing note reviewed  Constitutional:       General: He is not in acute distress  Appearance: Normal appearance  Interventions: Nasal cannula in place  HENT:      Head: Normocephalic  Mouth/Throat:      Mouth: Mucous membranes are moist    Eyes:      Pupils: Pupils are equal, round, and reactive to light     Cardiovascular:      Rate and Rhythm: Normal rate and regular rhythm  Heart sounds: No murmur heard  Pulmonary:      Effort: Pulmonary effort is normal  No respiratory distress  Breath sounds: Normal breath sounds  No wheezing, rhonchi or rales  Abdominal:      General: Bowel sounds are normal  There is no distension  Palpations: Abdomen is soft  Tenderness: There is no abdominal tenderness  There is no guarding  Musculoskeletal:         General: No deformity  Cervical back: Normal range of motion  Right lower leg: No edema  Left lower leg: No edema  Skin:     Capillary Refill: Capillary refill takes less than 2 seconds  Neurological:      General: No focal deficit present  Mental Status: He is alert and oriented to person, place, and time  Mental status is at baseline  Discussion with Family: Updated  (son) via phone  Discharge instructions/Information to patient and family:   See after visit summary for information provided to patient and family  Provisions for Follow-Up Care:  See after visit summary for information related to follow-up care and any pertinent home health orders  Disposition:   Home with VNA Services (Reminder: Complete face to face encounter)    Planned Readmission: no     Discharge Statement:  I spent 45 minutes discharging the patient  This time was spent on the day of discharge  I had direct contact with the patient on the day of discharge  Greater than 50% of the total time was spent examining patient, answering all patient questions, arranging and discussing plan of care with patient as well as directly providing post-discharge instructions  Additional time then spent on discharge activities  Discharge Medications:  See after visit summary for reconciled discharge medications provided to patient and/or family        **Please Note: This note may have been constructed using a voice recognition system**

## 2022-04-25 NOTE — PLAN OF CARE
Problem: MOBILITY - ADULT  Goal: Maintain or return to baseline ADL function  Description: INTERVENTIONS:  -  Assess patient's ability to carry out ADLs; assess patient's baseline for ADL function and identify physical deficits which impact ability to perform ADLs (bathing, care of mouth/teeth, toileting, grooming, dressing, etc )  - Assess/evaluate cause of self-care deficits   - Assess range of motion  - Assess patient's mobility; develop plan if impaired  - Assess patient's need for assistive devices and provide as appropriate  - Encourage maximum independence but intervene and supervise when necessary  - Involve family in performance of ADLs  - Assess for home care needs following discharge   - Consider OT consult to assist with ADL evaluation and planning for discharge  - Provide patient education as appropriate  Outcome: Progressing  Goal: Maintains/Returns to pre admission functional level  Description: INTERVENTIONS:  - Perform BMAT or MOVE assessment daily    - Set and communicate daily mobility goal to care team and patient/family/caregiver  - Collaborate with rehabilitation services on mobility goals if consulted  - Perform Range of Motion 3 times a day  - Reposition patient every 2 hours    - Dangle patient 3 times a day  - Stand patient 3 times a day  - Ambulate patient 3 times a day  - Out of bed to chair 3 times a day   - Out of bed for meals 3 times a day  - Out of bed for toileting  - Record patient progress and toleration of activity level   Outcome: Progressing     Problem: Potential for Falls  Goal: Patient will remain free of falls  Description: INTERVENTIONS:  - Educate patient/family on patient safety including physical limitations  - Instruct patient to call for assistance with activity   - Consult OT/PT to assist with strengthening/mobility   - Keep Call bell within reach  - Keep bed low and locked with side rails adjusted as appropriate  - Keep care items and personal belongings within reach  - Initiate and maintain comfort rounds  - Make Fall Risk Sign visible to staff  - Offer Toileting every 2 Hours, in advance of need  - Initiate/Maintain bed alarm  - Apply yellow socks and bracelet for high fall risk patients  - Consider moving patient to room near nurses station  Outcome: Progressing     Problem: PAIN - ADULT  Goal: Verbalizes/displays adequate comfort level or baseline comfort level  Description: Interventions:  - Encourage patient to monitor pain and request assistance  - Assess pain using appropriate pain scale  - Administer analgesics based on type and severity of pain and evaluate response  - Implement non-pharmacological measures as appropriate and evaluate response  - Consider cultural and social influences on pain and pain management  - Notify physician/advanced practitioner if interventions unsuccessful or patient reports new pain  Outcome: Progressing     Problem: INFECTION - ADULT  Goal: Absence or prevention of progression during hospitalization  Description: INTERVENTIONS:  - Assess and monitor for signs and symptoms of infection  - Monitor lab/diagnostic results  - Monitor all insertion sites, i e  indwelling lines, tubes, and drains  - Monitor endotracheal if appropriate and nasal secretions for changes in amount and color  - [place] appropriate cooling/warming therapies per order  - Administer medications as ordered  - Instruct and encourage patient and family to use good hand hygiene technique  - Identify and instruct in appropriate isolation precautions for identified infection/condition  Outcome: Progressing     Problem: RESPIRATORY - ADULT  Goal: Achieves optimal ventilation and oxygenation  Description: INTERVENTIONS:  - Assess for changes in respiratory status  - Assess for changes in mentation and behavior  - Position to facilitate oxygenation and minimize respiratory effort  - Oxygen administered by appropriate delivery if ordered  - Initiate smoking cessation education as indicated  - Encourage broncho-pulmonary hygiene including cough, deep breathe, Incentive Spirometry  - Assess the need for suctioning and aspirate as needed  - Assess and instruct to report SOB or any respiratory difficulty  - Respiratory Therapy support as indicated  Outcome: Progressing     Problem: Prexisting or High Potential for Compromised Skin Integrity  Goal: Skin integrity is maintained or improved  Description: INTERVENTIONS:  - Identify patients at risk for skin breakdown  - Assess and monitor skin integrity  - Assess and monitor nutrition and hydration status  - Monitor labs   - Assess for incontinence   - Turn and reposition patient  - Assist with mobility/ambulation  - Relieve pressure over bony prominences  - Avoid friction and shearing  - Provide appropriate hygiene as needed including keeping skin clean and dry  - Evaluate need for skin moisturizer/barrier cream  - Collaborate with interdisciplinary team   - Patient/family teaching  - Consider wound care consult   Outcome: Progressing

## 2022-04-28 LAB
BACTERIA BLD CULT: NORMAL
BACTERIA BLD CULT: NORMAL

## 2022-05-03 NOTE — PROGRESS NOTES
Cardiology Follow Up    [facility]  3/[birth year]69  [id number]  [facility] [place]  [address]  [address]  [phone]    1  Chronic diastolic heart failure ([facility] [address] )     2  Primary hypertension     3  Hyperlipidemia, unspecified hyperlipidemia type     4  Chronic obstructive pulmonary disease, unspecified COPD type ([facility] [address] )     5  Chronic respiratory failure with hypoxia (HCC)     6  Pulmonary hypertension ([facility] [address] )         Interval History:   [facility] on 3/12 - 3/17/22 with acute on chronic HFpEF     [facility] was admitted to [facility] on 4/23 - 4/25/22 with pneumonia  Mr Jordon Harris presented to the ED with worsening shortness of breath, fever, and cough  He was felt to have LLL infiltrate and treated with one dose of antibiotic  Procalcitonin 10  Antibiotic stopped  He was felt to have acute on chronic diastolic heart failure with a 15 pound weight gain in one month  Treated with IV Lasix  Discharge weight 153 pounds  Mr BANNER VERGARA [facility] presents our office for recent hospitalization follow up visit  His primary language is [address] Jerrica Ness is accompanied by his son and wife  His son is   Interpreting during this office visit  He is using oxygen at 3 L nasal cannula  Oxygen saturation is 94%  4 days ago he experienced a sharp chest pain the went away with quickly with movement  Jerrica Ness denies worsening shortness of breath  He admits to 3 pillow orthopnea and denies PND  His weight at home is 131 pounds  Medical History  Hypertension  Hyperlipidemia 3/14/22 , TG 84, HDL 26, LDL 84   COPD on oxygen 3LNC around the clock   Descending aortic aneurysm  Hx of DVT     3/14/22 TTE:  Left ventricular cavity size normal wall thickness moderate to severely increased  Moderate to severe concentric hypertrophy  Left ventricular ejection fraction 73%  Systolic function normal   Diastolic function mildly abnormal consistent with grade 1 abnormal relaxation  Left atrial filling pressure is normal   Right ventricular cavity size mildly dilated systolic function normal   Right atrium mildly dilated  Mild to moderate TR  Pulmonary artery systolic pressure 62 mmHg moderately increased    Patient Active Problem List   Diagnosis    Accelerated essential hypertension    COPD (chronic obstructive pulmonary disease) (HCC)    Descending aortic aneurysm (HCC)    History of DVT (deep vein thrombosis)    Benign essential hypertension    Thoracic aortic aneurysm (HCC)    Chronic respiratory failure with hypoxia (HCC)    Varicose veins of both lower extremities with pain    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HCC)    Pneumonia    Edema of both legs    Snoring    Thoracic aortic aneurysm without rupture ([address] )    Aneurysm, aorta, thoracic ([address] )    Leukocytosis    Thrombocytopenia ([address]    Hypochloremia    Abdominal pain    Incarcerated right inguinal hernia    Acute on chronic diastolic congestive heart failure ([address]    Acquired renal cyst of left kidney    Preop exam for internal medicine    Left leg swelling    TIA (transient ischemic attack)    Early satiety    Dysphagia    Altered mental status    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)    Rectal bleeding    Hyperlipidemia    Proctitis    Clostridium difficile infection    Fever due to infection    Lightheadedness    BELLA (acute kidney injury) ([address] )    CHF (congestive heart failure) ([facility] [address] )     Past Medical History:   Diagnosis Date    Acute metabolic encephalopathy 1/82/4461    Appendicolith     Ascending aortic aneurysm ([facility])     3 7    Asthma     BPH (benign prostatic hyperplasia)     CAD (coronary artery disease)     noted on CT scan    COPD (chronic obstructive pulmonary disease) ([facility])     Descending thoracic aortic aneurysm ([address]     Diverticulosis     Former tobacco use     GERD (gastroesophageal reflux disease)     History of DVT (deep vein thrombosis)     Left leg    History of transfusion     Hypertension     Inguinal hernia     right    Nephrolithiasis     Oxygen dependent     2LNC    Prostate calculus     PVD (peripheral vascular disease) (HCC)     Ulcer     Varicose vein of leg     b/l     Social History     Socioeconomic History    Marital status: /Civil Union     Spouse name: Not on file    Number of children: Not on file    Years of education: Not on file    Highest education level: Not on file   Occupational History    Not on file   Tobacco Use    Smoking status: Former Smoker     Packs/day: 1 00     Years: 35 00     Pack years: 35 00     Start date:      Quit date:      Years since quittin 3    Smokeless tobacco: Never Used   Vaping Use    Vaping Use: Never used   Substance and Sexual Activity    Alcohol use: Never    Drug use: No    Sexual activity: Never   Other Topics Concern    Not on file   Social History Narrative    ** Merged History Encounter **          Social Determinants of Health     Financial Resource Strain: Not on file   Food Insecurity: No Food Insecurity    Worried About Running Out of Food in the Last Year: Never true    Emmie of Food in the Last Year: Never true   Transportation Needs: No Transportation Needs    Lack of Transportation (Medical): No    Lack of Transportation (Non-Medical):  No   Physical Activity: Not on file   Stress: Not on file   Social Connections: Not on file   Intimate Partner Violence: Not on file   Housing Stability: Low Risk     Unable to Pay for Housing in the Last Year: No    Number of Places Lived in the Last Year: 1    Unstable Housing in the Last Year: No      Family History   Problem Relation Age of Onset    Tuberculosis Mother     No Known Problems Father     Cancer Sister     Diabetes Family     Hypertension Family      Past Surgical History: Procedure Laterality Date    CARDIAC SURGERY      ESOPHAGOGASTRODUODENOSCOPY      HERNIA REPAIR Right 1/21/2019    Procedure: REPAIR HERNIA INGUINAL WITH MESH;  Surgeon: Shan Ramirez MD;  Location[facility] MAIN OR;  Service: General    INGUINAL HERNIA REPAIR Bilateral     IR TEVAR  12/27/2018    ND ENDOVASC TAA REINCL SUBCL N/A 12/27/2018    Procedure: TEVAR - endovascular thoracic aortic aneurysm repair;  Surgeon: Joe Amador MD;  Location:  MAIN OR;  Service: Vascular    THORACIC AORTIC ANEURYSM REPAIR  12/27/2018    VARICOSE VEIN SURGERY Bilateral     vein stripping       Current Outpatient Medications:     acetaminophen (TYLENOL) 325 mg tablet, Take 2 tablets (650 mg total) by mouth every 6 (six) hours as needed for fever (temperature greater than 101 F), Disp: 30 tablet, Rfl: 0    albuterol (2 5 mg/3 mL) 0 083 % nebulizer solution, Take 1 vial (2 5 mg total) by nebulization every 6 (six) hours as needed for wheezing or shortness of breath, Disp: 75 mL, Rfl: 1    albuterol (VENTOLIN HFA) 90 mcg/act inhaler, Inhale 2 puffs, Disp: , Rfl:     amLODIPine (NORVASC) 10 mg tablet, Take 0 5 tablets (5 mg total) by mouth daily, Disp: 30 tablet, Rfl: 0    aspirin (Aspirin 81) 81 mg EC tablet, Take 1 tablet (81 mg total) by mouth daily, Disp: 30 tablet, Rfl: 0    atorvastatin (LIPITOR) 10 mg tablet, Take 1 tablet (10 mg total) by mouth daily, Disp: 30 tablet, Rfl: 0    benzonatate (TESSALON PERLES) 100 mg capsule, Take 1 capsule (100 mg total) by mouth 3 (three) times a day as needed for cough, Disp: 20 capsule, Rfl: 0    budesonide-formoterol (SYMBICORT) 160-4 5 mcg/act inhaler, Inhale 2 puffs 2 (two) times a day Rinse mouth after use , Disp: 1 Inhaler, Rfl: 0    ferrous sulfate 325 (65 Fe) mg tablet, Take 325 mg by mouth daily with breakfast, Disp: , Rfl:     furosemide (Lasix) 20 mg tablet, Take 0 5 tablets (10 mg total) by mouth daily, Disp: 30 tablet, Rfl: 0    melatonin 3 mg, Take 2 tablets (6 mg total) by mouth daily at bedtime (Patient not taking: Reported on 6/19/2020), Disp: 90 tablet, Rfl: 0    metoprolol tartrate (LOPRESSOR) 50 mg tablet, Take 1 tablet (50 mg total) by mouth every 12 (twelve) hours, Disp: 60 tablet, Rfl: 0    pantoprazole (PROTONIX) 40 mg tablet, Take 1 tablet (40 mg total) by mouth daily, Disp: 30 tablet, Rfl: 0    tiotropium (SPIRIVA) 18 mcg inhalation capsule, Place 1 capsule (18 mcg total) into inhaler and inhale daily, Disp: 30 capsule, Rfl: 0  Allergies   Allergen Reactions    Penicillins Hives, Itching and Rash    Lisinopril Rash     Side pains and rash        Labs:  Admission on 04/23/2022, Discharged on 04/25/2022   Component Date Value    WBC 04/23/2022 8 18     RBC 04/23/2022 4 19     Hemoglobin 04/23/2022 12 5     Hematocrit 04/23/2022 43 9     MCV 04/23/2022 105*    MCH 04/23/2022 29 8     MCHC 04/23/2022 28 5*    RDW 04/23/2022 14 7     MPV 04/23/2022 8 9     Platelets 17/77/5962 135*    nRBC 04/23/2022 0     Neutrophils Relative 04/23/2022 68     Immat GRANS % 04/23/2022 0     Lymphocytes Relative 04/23/2022 11*    Monocytes Relative 04/23/2022 12     Eosinophils Relative 04/23/2022 8*    Basophils Relative 04/23/2022 1     Neutrophils Absolute 04/23/2022 5 61     Immature Grans Absolute 04/23/2022 0 03     Lymphocytes Absolute 04/23/2022 0 87     Monocytes Absolute 04/23/2022 0 97     Eosinophils Absolute 04/23/2022 0 65*    Basophils Absolute 04/23/2022 0 05     Sodium 04/23/2022 145     Potassium 04/23/2022 4 2     Chloride 04/23/2022 104     CO2 04/23/2022 38*    ANION GAP 04/23/2022 3*    BUN 04/23/2022 19     Creatinine 04/23/2022 0 99     Glucose 04/23/2022 117     Calcium 04/23/2022 8 8     Corrected Calcium 04/23/2022 9 4     AST 04/23/2022 22     ALT 04/23/2022 19     Alkaline Phosphatase 04/23/2022 76     Total Protein 04/23/2022 7 5     Albumin 04/23/2022 3 2*    Total Bilirubin 04/23/2022 0 74     eGFR 04/23/2022 69     LACTIC ACID 04/23/2022 1 1     Protime 04/23/2022 13 9     INR 04/23/2022 1 10     PTT 04/23/2022 30     Procalcitonin 04/23/2022 0 05     Color, UA 04/23/2022 Yellow     Clarity, UA 04/23/2022 Clear     Specific Gravity, UA 04/23/2022 1 015     pH, UA 04/23/2022 7 0     Leukocytes, UA 04/23/2022 Negative     Nitrite, UA 04/23/2022 Negative     Protein, UA 04/23/2022 Negative     Glucose, UA 04/23/2022 Negative     Ketones, UA 04/23/2022 Negative     Urobilinogen, UA 04/23/2022 0 2     Bilirubin, UA 04/23/2022 Negative     Blood, UA 04/23/2022 Negative     Blood Culture 04/23/2022 No Growth After 5 Days   Blood Culture 04/23/2022 No Growth After 5 Days       hs TnI 0hr 04/23/2022 11     SARS-CoV-2 04/23/2022 Negative     INFLUENZA A PCR 04/23/2022 Negative     INFLUENZA B PCR 04/23/2022 Negative     RSV PCR 04/23/2022 Negative     NT-proBNP 04/23/2022 992*    hs TnI 2hr 04/23/2022 14     Delta 2hr hsTnI 04/23/2022 3     hs TnI 4hr 04/23/2022 15     Delta 4hr hsTnI 04/23/2022 4     Ventricular Rate 04/23/2022 63     Atrial Rate 04/23/2022 63     AK Interval 04/23/2022 168     QRSD Interval 04/23/2022 78     QT Interval 04/23/2022 400     QTC Interval 04/23/2022 409     P Axis 04/23/2022 70     QRS Axis 04/23/2022 92     T Wave Axis 04/23/2022 70     Ventricular Rate 04/23/2022 56     Atrial Rate 04/23/2022 56     AK Interval 04/23/2022 160     QRSD Interval 04/23/2022 82     QT Interval 04/23/2022 446     QTC Interval 04/23/2022 430     P Axis 04/23/2022 74     QRS Axis 04/23/2022 88     T Wave Axis 04/23/2022 61     Procalcitonin 04/24/2022 0 10     Sodium 04/24/2022 143     Potassium 04/24/2022 3 9     Chloride 04/24/2022 100     CO2 04/24/2022 38*    ANION GAP 04/24/2022 5     BUN 04/24/2022 16     Creatinine 04/24/2022 0 91     Glucose 04/24/2022 99     Calcium 04/24/2022 8 9     eGFR 04/24/2022 77     WBC 04/24/2022 7 88     RBC 04/24/2022 4 27  Hemoglobin 04/24/2022 12 8     Hematocrit 04/24/2022 45 0     MCV 04/24/2022 105*    MCH 04/24/2022 30 0     MCHC 04/24/2022 28 4*    RDW 04/24/2022 14 6     Platelets 21/11/1742 133*    MPV 04/24/2022 9 0     NT-proBNP 04/25/2022 602*    Sodium 04/25/2022 142     Potassium 04/25/2022 3 6     Chloride 04/25/2022 96*    CO2 04/25/2022 42*    ANION GAP 04/25/2022 4     BUN 04/25/2022 17     Creatinine 04/25/2022 0 91     Glucose 04/25/2022 96     Calcium 04/25/2022 9 1     eGFR 04/25/2022 77    No results displayed because visit has over 200 results  Imaging: XR chest 1 view portable    Result Date: 4/24/2022  Narrative: CHEST INDICATION:   fever/ cough  COMPARISON:  3/14/2022 EXAM PERFORMED/VIEWS:  XR CHEST PORTABLE FINDINGS: Cardiomediastinal silhouette appears unremarkable  Increased bilateral reticular markings consistent with pulmonary edema secondary to CHF or fluid overload  Hazy airspace opacity at the left lung base  Osseous structures appear within normal limits for patient age  Stent again noted in the descending thoracic aorta  Impression: Hazy airspace opacity at the left lung base compatible with atelectasis or infiltrate in appropriate clinical context Workstation performed: [id number]       Review of Systems:  Review of Systems   HENT: Positive for hearing loss  Respiratory: Positive for shortness of breath  Musculoskeletal: Positive for arthralgias and myalgias  All other systems reviewed and are negative  Physical Exam:  Physical Exam  Vitals reviewed  HENT:      Head: Normocephalic  Neck:      Comments: No JVD noted   Cardiovascular:      Rate and Rhythm: Normal rate and regular rhythm  Pulses: Normal pulses  Heart sounds: Normal heart sounds  Pulmonary:      Effort: Pulmonary effort is normal       Comments: Diminished breath sounds   Abdominal:      General: Bowel sounds are normal       Palpations: Abdomen is soft     Musculoskeletal: General: Normal range of motion  Cervical back: Normal range of motion and neck supple  Right lower leg: Edema present  Left lower leg: Edema present  Comments: Trace RLE edema, 1+ LLE edema    Kyphosis    Skin:     General: Skin is warm and dry  Capillary Refill: Capillary refill takes less than 2 seconds  Neurological:      General: No focal deficit present  Mental Status: He is alert and oriented to person, place, and time  Psychiatric:         Mood and Affect: Mood normal          Behavior: Behavior normal          Discussion/Summary:  1  Chronic HFpEF,  NYHA class III stage C, LVEF 73% Grade 1 abnormal relaxation , on PE eu volemic and compensated,  HR and BP controlled, weight in the office 141 pounds and at home 131 pounds  Continue on Lasix 10mg daily,   Metoprolol tartrate 50 mg q 12 hours, heart failure booklet and English and Iraqi provided to his son and his wife  I have reinforced 2 g sodium diet and daily weights follow-up in our office in 2 weeks  2  Hypertension RUE sitting 1200/86,  Continue metoprolol tartrate 50 mg q 12 hours,   2 g sodium diet  3  Hyperlipidemia 3/14/22 , TG 84, HDL 26, LDL 84  Continue Lipitor 10 mg daily heart healthy diet  4  COPD   Continues on Symbicort 2 puffs b i d  albuterol p r n  Spiriva 18 mcg daily  5  Chronic hypoxic respiratory failure continues on oxygen 3LNC around the clock  6   PHTN 62mmHg WHO group II ( LVDD) group III ( COPD)

## 2022-05-04 ENCOUNTER — OFFICE VISIT (OUTPATIENT)
Dept: CARDIOLOGY CLINIC | Facility: CLINIC | Age: 87
End: 2022-05-04
Payer: MEDICARE

## 2022-05-04 VITALS
HEART RATE: 55 BPM | HEIGHT: 65 IN | BODY MASS INDEX: 23.51 KG/M2 | DIASTOLIC BLOOD PRESSURE: 78 MMHG | WEIGHT: 141.1 LBS | OXYGEN SATURATION: 88 % | SYSTOLIC BLOOD PRESSURE: 152 MMHG

## 2022-05-04 DIAGNOSIS — I27.20 PULMONARY HYPERTENSION (HCC): ICD-10-CM

## 2022-05-04 DIAGNOSIS — J96.11 CHRONIC RESPIRATORY FAILURE WITH HYPOXIA (HCC): ICD-10-CM

## 2022-05-04 DIAGNOSIS — I10 PRIMARY HYPERTENSION: ICD-10-CM

## 2022-05-04 DIAGNOSIS — J44.9 CHRONIC OBSTRUCTIVE PULMONARY DISEASE, UNSPECIFIED COPD TYPE (HCC): ICD-10-CM

## 2022-05-04 DIAGNOSIS — I50.32 CHRONIC DIASTOLIC HEART FAILURE (HCC): Primary | ICD-10-CM

## 2022-05-04 DIAGNOSIS — E78.5 HYPERLIPIDEMIA, UNSPECIFIED HYPERLIPIDEMIA TYPE: ICD-10-CM

## 2022-05-04 PROCEDURE — 99215 OFFICE O/P EST HI 40 MIN: CPT | Performed by: INTERNAL MEDICINE

## 2022-05-04 RX ORDER — HYDROCHLOROTHIAZIDE 12.5 MG/1
TABLET ORAL
COMMUNITY
Start: 2022-04-12 | End: 2022-05-04

## 2022-05-04 NOTE — PATIENT INSTRUCTIONS
Maintain a 2 gram daily sodium diet and 1500 ml daily fluid restriction  Check daily weights  If you gain 3 pounds in one day, 5 pounds in one week, or experience worsening shortness of breath or increasing lower leg swelling  Please call the heart failure office at [phone]    Please bring a  list of your current medications and daily weights to the office visit

## 2022-05-18 NOTE — PROGRESS NOTES
Cardiology Follow Up    [facility]  9/21/1293  [id number]  [facility] [place]  [address]  [address]  [phone]    1  Acute on chronic diastolic heart failure (HCC)  furosemide (LASIX) injection 40 mg    furosemide (LASIX) 20 mg tablet    Basic metabolic panel    potassium chloride (K-DUR,KLOR-CON) 20 mEq tablet   2  Primary hypertension     3  Mixed hyperlipidemia     4  Pulmonary hypertension [facility])         Interval History:   [facility] [place] [facility] [place] with acute on chronic HFpEF      [facility] [place] [facility] [place] [place] 4[place]25/[place] with pneumonia  Mr Tonny Stevenson presented to the ED with worsening shortness of breath, fever, and cough  He was felt to have LLL infiltrate and treated with one dose of antibiotic  Procalcitonin 10  Antibiotic stopped  He was felt to have acute on chronic diastolic heart failure with a 15 pound weight gain in one month  Treated with IV Lasix  Discharge weight 153 pounds        On 0/95/06  [facility] seen in our office for a recent hospitalization follow up visit  His primary language is [address]   Geneva Perez is accompanied by his son and wife  His son is   Interpreting during this office visit  He is using oxygen at 3 L nasal cannula  Oxygen saturation is 94%  4 days ago he experienced a sharp chest pain the went away with quickly with movement  Geneva Perez denies worsening shortness of breath  He admits to 3 pillow orthopnea and denies PND  His weight at home is 131 pounds and in the office 131 pounds  He was felt to be high risk for re admission and scheduled for a 2 week follow up visit  Mr [facility] presents to our office for a follow up visit  He is accompanied by his son is interpreting during the office visit  His weight at home 139 - 140 pounds    He denies worsening dyspnea on exertion although he is feeling the need to increase his oxygen to 4 L nasal cannula  Hillary  has not been taking his Lasix daily  His weight is up 8-9 pounds  It is difficult to determine if he is experiencing dietary indiscretion        Medical History  Hypertension  Hyperlipidemia 3/14/22 , TG 84, HDL 26, LDL 84   COPD on oxygen 3LNC around the clock   Descending aortic aneurysm  Hx of DVT      3/14/22 TTE:  Left ventricular cavity size normal wall thickness moderate to severely increased  Moderate to severe concentric hypertrophy  Left ventricular ejection fraction 73%  Systolic function normal   Diastolic function mildly abnormal consistent with grade 1 abnormal relaxation  Left atrial filling pressure is normal   Right ventricular cavity size mildly dilated systolic function normal   Right atrium mildly dilated  Mild to moderate TR    Pulmonary artery systolic pressure 62 mmHg moderately increased    Patient Active Problem List   Diagnosis    Accelerated essential hypertension    COPD (chronic obstructive pulmonary disease) (HCC)    Descending aortic aneurysm (HCC)    History of DVT (deep vein thrombosis)    Benign essential hypertension    Thoracic aortic aneurysm (HCC)    Chronic respiratory failure with hypoxia (HCC)    Varicose veins of both lower extremities with pain    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HCC)    Pneumonia    Edema of both legs    Snoring    Thoracic aortic aneurysm without rupture ([address] )    Aneurysm, aorta, thoracic ([address] )    Leukocytosis    Thrombocytopenia ([address]    Hypochloremia    Abdominal pain    Incarcerated right inguinal hernia    Acute on chronic diastolic congestive heart failure ([address] )    Acquired renal cyst of left kidney    Preop exam for internal medicine    Left leg swelling    TIA (transient ischemic attack)    Early satiety    Dysphagia    Altered mental status    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)    Rectal bleeding    Hyperlipidemia    Proctitis    Clostridium difficile infection    Fever due to infection    Lightheadedness    [facility]    CHF (congestive heart failure) ([facility])     Past Medical History:   Diagnosis Date    Acute metabolic encephalopathy     Appendicolith     Ascending aortic aneurysm ([facility])     3 7    Asthma     BPH (benign prostatic hyperplasia)     CAD (coronary artery disease)     noted on CT scan    COPD (chronic obstructive pulmonary disease) ([facility])     Descending thoracic aortic aneurysm ([facility] )     Diverticulosis     Former tobacco use     GERD (gastroesophageal reflux disease)     History of DVT (deep vein thrombosis)     Left leg    History of transfusion     Hypertension     Inguinal hernia     right    Nephrolithiasis     Oxygen dependent     2LNC    Prostate calculus     PVD (peripheral vascular disease) ([facility])     Ulcer     Varicose vein of leg     b/l     Social History     Socioeconomic History    Marital status: /Civil Union     Spouse name: Not on file    Number of children: Not on file    Years of education: Not on file    Highest education level: Not on file   Occupational History    Not on file   Tobacco Use    Smoking status: Former Smoker     Packs/day: 1 00     Years: 35 00     Pack years: 35 00     Start date:      Quit date:      Years since quittin 3    Smokeless tobacco: Never Used   Vaping Use    Vaping Use: Never used   Substance and Sexual Activity    Alcohol use: Never    Drug use: No    Sexual activity: Never   Other Topics Concern    Not on file   Social History Narrative    ** Merged History Encounter **          Social Determinants of Health     Financial Resource Strain: Not on file   Food Insecurity: No Food Insecurity    Worried About Running Out of Food in the Last Year: Never true    Emmie of Food in the Last Year: Never true   Transportation Needs: No Transportation Needs  Lack of Transportation (Medical): No    Lack of Transportation (Non-Medical):  No   Physical Activity: Not on file   Stress: Not on file   Social Connections: Not on file   Intimate Partner Violence: Not on file   Housing Stability: Low Risk     Unable to Pay for Housing in the Last Year: No    Number of Places Lived in the Last Year: 1    Unstable Housing in the Last Year: No      Family History   Problem Relation Age of Onset    Tuberculosis Mother     No Known Problems Father     Cancer Sister     Diabetes Family     Hypertension Family      Past Surgical History:   Procedure Laterality Date    CARDIAC SURGERY      ESOPHAGOGASTRODUODENOSCOPY      HERNIA REPAIR Right 1/21/2019    Procedure: REPAIR HERNIA INGUINAL WITH MESH;  Surgeon: Nimesh Vasquez MD;  Location: [facility] MAIN OR;  Service: General    INGUINAL HERNIA REPAIR Bilateral     IR TEVAR  12/27/2018    VT ENDOVASC TAA REINCL SUBCL N/A 12/27/2018    Procedure: TEVAR - endovascular thoracic aortic aneurysm repair;  Surgeon: Bib Cobian MD;  Location:  MAIN OR;  Service: Vascular    THORACIC AORTIC ANEURYSM REPAIR  12/27/2018    VARICOSE VEIN SURGERY Bilateral     vein stripping       Current Outpatient Medications:     acetaminophen (TYLENOL) 325 mg tablet, Take 2 tablets (650 mg total) by mouth every 6 (six) hours as needed for fever (temperature greater than 101 F), Disp: 30 tablet, Rfl: 0    albuterol (2 5 mg/3 mL) 0 083 % nebulizer solution, Take 1 vial (2 5 mg total) by nebulization every 6 (six) hours as needed for wheezing or shortness of breath, Disp: 75 mL, Rfl: 1    albuterol (VENTOLIN HFA) 90 mcg/act inhaler, Inhale 2 puffs, Disp: , Rfl:     aspirin (Aspirin 81) 81 mg EC tablet, Take 1 tablet (81 mg total) by mouth daily, Disp: 30 tablet, Rfl: 0    atorvastatin (LIPITOR) 10 mg tablet, Take 1 tablet (10 mg total) by mouth daily, Disp: 30 tablet, Rfl: 0    benzonatate (TESSALON PERLES) 100 mg capsule, Take 1 capsule (100 mg total) by mouth 3 (three) times a day as needed for cough, Disp: 20 capsule, Rfl: 0    budesonide-formoterol (SYMBICORT) 160-4 5 mcg/act inhaler, Inhale 2 puffs 2 (two) times a day Rinse mouth after use , Disp: 1 Inhaler, Rfl: 0    ferrous sulfate 325 (65 Fe) mg tablet, Take 325 mg by mouth daily with breakfast, Disp: , Rfl:     furosemide (Lasix) 20 mg tablet, Take 0 5 tablets (10 mg total) by mouth daily, Disp: 30 tablet, Rfl: 0    melatonin 3 mg, Take 2 tablets (6 mg total) by mouth daily at bedtime, Disp: 90 tablet, Rfl: 0    metoprolol tartrate (LOPRESSOR) 50 mg tablet, Take 1 tablet (50 mg total) by mouth every 12 (twelve) hours, Disp: 60 tablet, Rfl: 0    pantoprazole (PROTONIX) 40 mg tablet, Take 1 tablet (40 mg total) by mouth daily, Disp: 30 tablet, Rfl: 0    tiotropium (SPIRIVA) 18 mcg inhalation capsule, Place 1 capsule (18 mcg total) into inhaler and inhale daily, Disp: 30 capsule, Rfl: 0  Allergies   Allergen Reactions    Penicillins Hives, Itching and Rash    Lisinopril Rash     Side pains and rash        Labs:  Admission on 04/23/2022, Discharged on 04/25/2022   Component Date Value    WBC 04/23/2022 8 18     RBC 04/23/2022 4 19     Hemoglobin 04/23/2022 12 5     Hematocrit 04/23/2022 43 9     MCV 04/23/2022 105 (A)    MCH 04/23/2022 29 8     MCHC 04/23/2022 28 5 (A)    RDW 04/23/2022 14 7     MPV 04/23/2022 8 9     Platelets 27/97/9146 135 (A)    nRBC 04/23/2022 0     Neutrophils Relative 04/23/2022 68     Immat GRANS % 04/23/2022 0     Lymphocytes Relative 04/23/2022 11 (A)    Monocytes Relative 04/23/2022 12     Eosinophils Relative 04/23/2022 8 (A)    Basophils Relative 04/23/2022 1     Neutrophils Absolute 04/23/2022 5 61     Immature Grans Absolute 04/23/2022 0 03     Lymphocytes Absolute 04/23/2022 0 87     Monocytes Absolute 04/23/2022 0 97     Eosinophils Absolute 04/23/2022 0 65 (A)    Basophils Absolute 04/23/2022 0 05     Sodium 04/23/2022 145     Potassium 04/23/2022 4 2     Chloride 04/23/2022 104     CO2 04/23/2022 38 (A)    ANION GAP 04/23/2022 3 (A)    BUN 04/23/2022 19     Creatinine 04/23/2022 0 99     Glucose 04/23/2022 117     Calcium 04/23/2022 8 8     Corrected Calcium 04/23/2022 9 4     AST 04/23/2022 22     ALT 04/23/2022 19     Alkaline Phosphatase 04/23/2022 76     Total Protein 04/23/2022 7 5     Albumin 04/23/2022 3 2 (A)    Total Bilirubin 04/23/2022 0 74     eGFR 04/23/2022 69     LACTIC ACID 04/23/2022 1 1     Protime 04/23/2022 13 9     INR 04/23/2022 1 10     PTT 04/23/2022 30     Procalcitonin 04/23/2022 0 05     Color, UA 04/23/2022 Yellow     Clarity, UA 04/23/2022 Clear     Specific Gravity, UA 04/23/2022 1 015     pH, UA 04/23/2022 7 0     Leukocytes, UA 04/23/2022 Negative     Nitrite, UA 04/23/2022 Negative     Protein, UA 04/23/2022 Negative     Glucose, UA 04/23/2022 Negative     Ketones, UA 04/23/2022 Negative     Urobilinogen, UA 04/23/2022 0 2     Bilirubin, UA 04/23/2022 Negative     Blood, UA 04/23/2022 Negative     Blood Culture 04/23/2022 No Growth After 5 Days   Blood Culture 04/23/2022 No Growth After 5 Days       hs TnI 0hr 04/23/2022 11     SARS-CoV-2 04/23/2022 Negative     INFLUENZA A PCR 04/23/2022 Negative     INFLUENZA B PCR 04/23/2022 Negative     RSV PCR 04/23/2022 Negative     NT-proBNP 04/23/2022 992 (A)    hs TnI 2hr 04/23/2022 14     Delta 2hr hsTnI 04/23/2022 3     hs TnI 4hr 04/23/2022 15     Delta 4hr hsTnI 04/23/2022 4     Ventricular Rate 04/23/2022 63     Atrial Rate 04/23/2022 63     OR Interval 04/23/2022 168     QRSD Interval 04/23/2022 78     QT Interval 04/23/2022 400     QTC Interval 04/23/2022 409     P Axis 04/23/2022 70     QRS Axis 04/23/2022 92     T Wave Axis 04/23/2022 70     Ventricular Rate 04/23/2022 56     Atrial Rate 04/23/2022 56     OR Interval 04/23/2022 160     QRSD Interval 04/23/2022 82     QT Interval 04/23/2022 446  QTC Interval 04/23/2022 430     P Axis 04/23/2022 74     QRS Axis 04/23/2022 88     T Wave Axis 04/23/2022 61     Procalcitonin 04/24/2022 0 10     Sodium 04/24/2022 143     Potassium 04/24/2022 3 9     Chloride 04/24/2022 100     CO2 04/24/2022 38 (A)    ANION GAP 04/24/2022 5     BUN 04/24/2022 16     Creatinine 04/24/2022 0 91     Glucose 04/24/2022 99     Calcium 04/24/2022 8 9     eGFR 04/24/2022 77     WBC 04/24/2022 7 88     RBC 04/24/2022 4 27     Hemoglobin 04/24/2022 12 8     Hematocrit 04/24/2022 45 0     MCV 04/24/2022 105 (A)    MCH 04/24/2022 30 0     MCHC 04/24/2022 28 4 (A)    RDW 04/24/2022 14 6     Platelets 46/43/5135 133 (A)    MPV 04/24/2022 9 0     NT-proBNP 04/25/2022 602 (A)    Sodium 04/25/2022 142     Potassium 04/25/2022 3 6     Chloride 04/25/2022 96 (A)    CO2 04/25/2022 42 (A)    ANION GAP 04/25/2022 4     BUN 04/25/2022 17     Creatinine 04/25/2022 0 91     Glucose 04/25/2022 96     Calcium 04/25/2022 9 1     eGFR 04/25/2022 77      Imaging: XR chest 1 view portable    Result Date: 4/24/2022  Narrative: CHEST INDICATION:   fever/ cough  COMPARISON:  3/14/2022 EXAM PERFORMED/VIEWS:  XR CHEST PORTABLE FINDINGS: Cardiomediastinal silhouette appears unremarkable  Increased bilateral reticular markings consistent with pulmonary edema secondary to CHF or fluid overload  Hazy airspace opacity at the left lung base  Osseous structures appear within normal limits for patient age  Stent again noted in the descending thoracic aorta  Impression: Hazy airspace opacity at the left lung base compatible with atelectasis or infiltrate in appropriate clinical context Workstation performed: [id number]       Review of Systems:  Review of Systems   Cardiovascular: Positive for leg swelling  Musculoskeletal: Positive for arthralgias and myalgias  All other systems reviewed and are negative  Physical Exam:  Physical Exam  Vitals reviewed     Constitutional: Appearance: Normal appearance  HENT:      Head: Normocephalic  Neck:      Comments: +JVD  Cardiovascular:      Rate and Rhythm: Normal rate and regular rhythm  Pulses: Normal pulses  Heart sounds: Normal heart sounds  Pulmonary:      Effort: Pulmonary effort is normal       Breath sounds: Rales present  Comments: Bibasilar raled, diminished breath sounds   Abdominal:      General: Bowel sounds are normal       Palpations: Abdomen is soft  Musculoskeletal:         General: Normal range of motion  Cervical back: Normal range of motion and neck supple  Right lower leg: Edema present  Left lower leg: Edema present  Comments: 1+ RLE edema, 2+ LLE edema    Skin:     General: Skin is warm and dry  Capillary Refill: Capillary refill takes less than 2 seconds  Neurological:      Mental Status: He is alert and oriented to person, place, and time  Mental status is at baseline  Psychiatric:         Mood and Affect: Mood normal          Behavior: Behavior normal          Discussion/Summary:  1  Acute on Chronic HFpEF LVEF 73% NYHA class III stage C- weight at home 139-140 pounds, in the office 142 pounds  Up 8-9 pounds in 2 weeks,  On PE decompensated, +JVD, bibasilar rales, 1+ RLE edema and 2+ LLE edema,  BP elevated 140/80, HR controlled,  Severiano is not taking his Lasix, most likely last dose 6 days ago  Son informs me difficulty breaking tablet  IV Lasix 40mg given in the office today  Lasix 20mg daily for 2 days then take EOD  Continue on a 2gm sodium diet restriction, 1500 cc fluid restriction and daily weights  BMP in one week   2  Hypertension- RUE sitting 140/80,   Continue metoprolol tartrate 50 mg q 12 hours, instructed to take Lasix 20 mg daily for 2 days then every other day I have reinforced 2 g sodium diet will follow-up in our office in 2 weeks for BP monitoring  Plan to add Lisinopril if BP Continues > 130     3   Hyperlipidemia 3/14/22 , TG 84, HDL 26, LDL 84 - continue on Lipitor 10mg daily   4  Chronic hypoxic respiratory failure using oxygen 3 L nasal can around the clock  5   PHTN 62mmHg WHO group II ( LVDD) Group III ( COPD)

## 2022-05-19 ENCOUNTER — DOCUMENTATION (OUTPATIENT)
Dept: CARDIOLOGY CLINIC | Facility: CLINIC | Age: 87
End: 2022-05-19

## 2022-05-19 ENCOUNTER — OFFICE VISIT (OUTPATIENT)
Dept: CARDIOLOGY CLINIC | Facility: CLINIC | Age: 87
End: 2022-05-19
Payer: MEDICARE

## 2022-05-19 VITALS
SYSTOLIC BLOOD PRESSURE: 144 MMHG | OXYGEN SATURATION: 93 % | HEART RATE: 53 BPM | HEIGHT: 65 IN | BODY MASS INDEX: 23.68 KG/M2 | DIASTOLIC BLOOD PRESSURE: 88 MMHG | WEIGHT: 142.1 LBS

## 2022-05-19 DIAGNOSIS — I10 PRIMARY HYPERTENSION: ICD-10-CM

## 2022-05-19 DIAGNOSIS — E78.2 MIXED HYPERLIPIDEMIA: ICD-10-CM

## 2022-05-19 DIAGNOSIS — I27.20 PULMONARY HYPERTENSION (HCC): ICD-10-CM

## 2022-05-19 DIAGNOSIS — I50.33 ACUTE ON CHRONIC DIASTOLIC HEART FAILURE (HCC): Primary | ICD-10-CM

## 2022-05-19 PROCEDURE — 99215 OFFICE O/P EST HI 40 MIN: CPT | Performed by: NURSE PRACTITIONER

## 2022-05-19 RX ORDER — FUROSEMIDE 20 MG/1
TABLET ORAL
Qty: 90 TABLET | Refills: 3 | Status: SHIPPED | OUTPATIENT
Start: 2022-05-19

## 2022-05-19 RX ORDER — FUROSEMIDE 10 MG/ML
40 INJECTION INTRAMUSCULAR; INTRAVENOUS ONCE
Status: COMPLETED | OUTPATIENT
Start: 2022-05-19 | End: 2022-05-19

## 2022-05-19 RX ORDER — POTASSIUM CHLORIDE 20 MEQ/1
TABLET, EXTENDED RELEASE ORAL
Qty: 33 TABLET | Refills: 4 | Status: SHIPPED | OUTPATIENT
Start: 2022-05-19

## 2022-05-19 RX ADMIN — FUROSEMIDE 40 MG: 10 INJECTION INTRAMUSCULAR; INTRAVENOUS at 13:40

## 2022-05-19 NOTE — PATIENT INSTRUCTIONS
Maintain a 2 gram daily sodium diet and 1500 ml daily fluid restriction  Check daily weights  If you gain 3 pounds in one day, 5 pounds in one week, or experience worsening shortness of breath or increasing lower leg swelling  Please call the heart failure office at [phone]    Please bring a  list of your current medications and daily weights to the office visit     Lasix 20mg daily for 2 days, then every other day   K dur 20meq daily for 3 days then every other day when taking Lasix   Lab studies one week  Follow up in our office in 2 weeks

## 2022-05-19 NOTE — PROGRESS NOTES
Pt given 40 mg IV lasix without difficulty  IV d/c'd , cath in tacked   ml clear yellow urine    /80      Talked with son about low fluid and salt intake  Aware I will call tomorrow for update

## 2022-05-20 ENCOUNTER — TELEPHONE (OUTPATIENT)
Dept: CARDIOLOGY CLINIC | Facility: CLINIC | Age: 87
End: 2022-05-20

## 2022-05-20 NOTE — TELEPHONE ENCOUNTER
Pt wt today is 140 lbs    Edema is better then yesterday  He will take medication as prescribed  Will call if increased wt, edema, or SOB

## 2022-06-01 NOTE — PROGRESS NOTES
Cardiology Follow Up    [facility]  4/[birth year]  [phone]  [facility] [place]  [address]  [address]  [phone]    1  Chronic diastolic heart failure ([address] )     2  Primary hypertension     3  Mixed hyperlipidemia     4  COPD with hypoxia ([facility] )     5  Pulmonary hypertension ([facility] )         Interval History:   [facility] [place] - [facility] on 3/12 - 3/17/22 with acute on chronic HFpEF     Mr BANNER VERGARA [facility] was admitted to [facility] [place], [place] on 4/23 - 4/25/22 with pneumonia   Mr Anish Crenshaw presented to the ED with worsening shortness of breath, fever, and cough   He was felt to have LLL infiltrate and treated with one dose of antibiotic   Procalcitonin 10   Antibiotic stopped  [facility] was felt to have acute on chronic diastolic heart failure with a 15 pound weight gain in one month   Treated with IV Lasix   Discharge weight 153 pounds        On 0/03/99 Mr BANNER GOOD [facility] was seen in our office for a recent hospitalization follow up visit  His primary language is Maria Del Carmen Beat is accompanied by his son and wife   His son is   Interpreting during this office visit  [facility] is using oxygen at 3 L nasal cannula   Oxygen saturation is 94%   4 days ago he experienced a sharp chest pain the went away with quickly with movement   Severiano denies worsening shortness of breath   He admits to 3 pillow orthopnea and denies PND    His weight at home is 131 pounds and in the office 131 pounds  He was felt to be high risk for re admission and scheduled for a 2 week follow up visit  On 3/83/03 Mr BANNER VERGARA [facility] was seen in our  office for a follow up visit  He was accompanied by his son who was s interpreting during the office visit  His weight at home 139 - 140 pounds and in the office was 142 pounds   He denied worsening dyspnea on exertion although he is feeling the need to increase his oxygen to 4 L nasal [facility]  Torrey Rendon has not been taking his Lasix daily  His weight is up 8-9 pounds  It is difficult to determine if he is experiencing dietary indiscretion  On PE  Decompensated with positive JVD bibasilar rales and lower extremity edema  IV Lasix 40 mg given in the office  He was instructed to take Lasix 20 mg daily  Follow-up in our office in 2 weeks  Lab studies were ordered        Mr Babar Roy presents to our office for a Follow-up visit  He is accompanied by his son who is interpreting for him  His wife is also present in the office although she speaks [address] [facility] denies worsening dyspnea with exertion chest pain palpitations lightheadedness or dizziness  He is using oxygen at 3 L nasal cannula O2 sat 86% with poor inspiratory effort  With increased inspiratory pressure O2 sat 93% on 3 L nasal cannula  Weight at home 140 lb weight in the office 140 4 lb  According to his son him and his wife will not restrict her sodium in diet  I have instructed him on compliance with the dose of Lasix  He did not undergo pre ordered lab studies  Instructed to undergo lab studies today        Medical History  Hypertension  Hyperlipidemia 3/14/22 , TG 84, HDL 26, LDL 84   COPD on oxygen 3LNC around the clock   Descending aortic aneurysm  Hx of DVT      3/14/22 TTE:  Left ventricular cavity size normal wall thickness moderate to severely increased   Moderate to severe concentric hypertrophy   Left ventricular ejection fraction 90%   Systolic function normal   Diastolic function mildly abnormal consistent with grade 1 abnormal relaxation   Left atrial filling pressure is normal   Right ventricular cavity size mildly dilated systolic function normal   Right atrium mildly dilated   Mild to moderate TR   Pulmonary artery systolic pressure 62 mmHg moderately increased    Patient Active Problem List   Diagnosis    Accelerated essential hypertension    COPD (chronic obstructive pulmonary disease) ([facility] )    Descending aortic aneurysm ([place] [address] )    History of DVT (deep vein thrombosis)    Benign essential hypertension    Thoracic aortic aneurysm (HCC)    Chronic respiratory failure with hypoxia (HCC)    Varicose veins of both lower extremities with pain    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HCC)    Pneumonia    Edema of both legs    Snoring    Thoracic aortic aneurysm without rupture (HCC)    Aneurysm, aorta, thoracic (HCC)    Leukocytosis    Thrombocytopenia (HCC)    Hypochloremia    Abdominal pain    Incarcerated right inguinal hernia    Acute on chronic diastolic congestive heart failure (HCC)    Acquired renal cyst of left kidney    Preop exam for internal medicine    Left leg swelling    TIA (transient ischemic attack)    Early satiety    Dysphagia    Altered mental status    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)    Rectal bleeding    Hyperlipidemia    Proctitis    Clostridium difficile infection    Fever due to infection    Lightheadedness    BELLA (acute kidney injury) ([place] [address] )    CHF (congestive heart failure) ([place] [address] )     Past Medical History:   Diagnosis Date    Acute metabolic encephalopathy 9/57/0498    Appendicolith     Ascending aortic aneurysm ([facility])     3 7    Asthma     BPH (benign prostatic hyperplasia)     CAD (coronary artery disease)     noted on CT scan    COPD (chronic obstructive pulmonary disease) ([facility])     Descending thoracic aortic aneurysm ([place] [address] )     Diverticulosis     Former tobacco use     GERD (gastroesophageal reflux disease)     History of DVT (deep vein thrombosis)     Left leg    History of transfusion     Hypertension     Inguinal hernia     right    Nephrolithiasis     Oxygen dependent     2LNC    Prostate calculus     PVD (peripheral vascular disease) (HCC)     Ulcer     Varicose vein of leg     b/l     Social History     Socioeconomic History    Marital status: /Civil Union     Spouse name: Not on file    Number of children: Not on file    Years of education: Not on file    Highest education level: Not on file   Occupational History    Not on file   Tobacco Use    Smoking status: Former Smoker     Packs/day: 1 00     Years: 35 00     Pack years: 35 00     Start date:      Quit date:      Years since quittin 4    Smokeless tobacco: Never Used   Vaping Use    Vaping Use: Never used   Substance and Sexual Activity    Alcohol use: Never    Drug use: No    Sexual activity: Never   Other Topics Concern    Not on file   Social History Narrative    ** Merged History Encounter **          Social Determinants of Health     Financial Resource Strain: Not on file   Food Insecurity: No Food Insecurity    Worried About [address] [email or web address] in the Last Year: Never true    [address] [employer] in the Last Year: Never true   Transportation Needs: No Transportation Needs    Lack of Transportation (Medical): No    Lack of Transportation (Non-Medical):  No   Physical Activity: Not on file   Stress: Not on file   Social Connections: Not on file   Intimate Partner Violence: Not on file   Housing Stability: Low Risk     Unable to Pay for Housing in the Last Year: No    Number of Places Lived in the Last Year: 1    Unstable Housing in the Last Year: No      Family History   Problem Relation Age of Onset    Tuberculosis Mother     No Known Problems Father     Cancer Sister     Diabetes Family     Hypertension Family      Past Surgical History:   Procedure Laterality Date    CARDIAC SURGERY      ESOPHAGOGASTRODUODENOSCOPY      HERNIA REPAIR Right 2019    Procedure: REPAIR HERNIA INGUINAL WITH MESH;  Surgeon: Reji Young MD;  Location: [address] [place] [address] MAIN OR;  Service: General    INGUINAL HERNIA REPAIR Bilateral     IR TEVAR  2018    ID ENDOVASC TAA REINCL SUBCL N/A 2018    Procedure: TEVAR - endovascular thoracic aortic aneurysm repair;  Surgeon: Jeremias Wright MD;  Location:  MAIN OR; Service: Vascular    THORACIC AORTIC ANEURYSM REPAIR  12/27/2018    VARICOSE VEIN SURGERY Bilateral     vein stripping       Current Outpatient Medications:     acetaminophen (TYLENOL) 325 mg tablet, Take 2 tablets (650 mg total) by mouth every 6 (six) hours as needed for fever (temperature greater than 101 F), Disp: 30 tablet, Rfl: 0    albuterol (2 5 mg/3 mL) 0 083 % nebulizer solution, Take 1 vial (2 5 mg total) by nebulization every 6 (six) hours as needed for wheezing or shortness of breath, Disp: 75 mL, Rfl: 1    albuterol (PROVENTIL HFA,VENTOLIN HFA) 90 mcg/act inhaler, Inhale 2 puffs, Disp: , Rfl:     aspirin (Aspirin 81) 81 mg EC tablet, Take 1 tablet (81 mg total) by mouth daily, Disp: 30 tablet, Rfl: 0    atorvastatin (LIPITOR) 10 mg tablet, Take 1 tablet (10 mg total) by mouth daily, Disp: 30 tablet, Rfl: 0    benzonatate (TESSALON PERLES) 100 mg capsule, Take 1 capsule (100 mg total) by mouth 3 (three) times a day as needed for cough, Disp: 20 capsule, Rfl: 0    budesonide-formoterol (SYMBICORT) 160-4 5 mcg/act inhaler, Inhale 2 puffs 2 (two) times a day Rinse mouth after use , Disp: 1 Inhaler, Rfl: 0    ferrous sulfate 325 (65 Fe) mg tablet, Take 325 mg by mouth daily with breakfast, Disp: , Rfl:     furosemide (LASIX) 20 mg tablet, Lasix 20mg daily for 2 days in a row then every other day, Disp: 90 tablet, Rfl: 3    melatonin 3 mg, Take 2 tablets (6 mg total) by mouth daily at bedtime, Disp: 90 tablet, Rfl: 0    metoprolol tartrate (LOPRESSOR) 50 mg tablet, Take 1 tablet (50 mg total) by mouth every 12 (twelve) hours, Disp: 60 tablet, Rfl: 0    pantoprazole (PROTONIX) 40 mg tablet, Take 1 tablet (40 mg total) by mouth daily, Disp: 30 tablet, Rfl: 0    potassium chloride (K-DUR,KLOR-CON) 20 mEq tablet, Daily for 3 days then EOD when taking Lasix pill, Disp: 33 tablet, Rfl: 4    tiotropium (SPIRIVA) 18 mcg inhalation capsule, Place 1 capsule (18 mcg total) into inhaler and inhale daily, Disp: 30 capsule, Rfl: 0  Allergies   Allergen Reactions    Penicillins Hives, Itching and Rash    Lisinopril Rash     Side pains and rash        Labs:  Admission on 04/23/2022, Discharged on 04/25/2022   Component Date Value    WBC 04/23/2022 8 18     RBC 04/23/2022 4 19     Hemoglobin 04/23/2022 12 5     Hematocrit 04/23/2022 43 9     MCV 04/23/2022 105 (A)    MCH 04/23/2022 29 8     MCHC 04/23/2022 28 5 (A)    RDW 04/23/2022 14 7     MPV 04/23/2022 8 9     Platelets 05/07/4216 135 (A)    nRBC 04/23/2022 0     Neutrophils Relative 04/23/2022 68     Immat GRANS % 04/23/2022 0     Lymphocytes Relative 04/23/2022 11 (A)    Monocytes Relative 04/23/2022 12     Eosinophils Relative 04/23/2022 8 (A)    Basophils Relative 04/23/2022 1     Neutrophils Absolute 04/23/2022 5 61     Immature Grans Absolute 04/23/2022 0 03     Lymphocytes Absolute 04/23/2022 0 87     Monocytes Absolute 04/23/2022 0 97     Eosinophils Absolute 04/23/2022 0 65 (A)    Basophils Absolute 04/23/2022 0 05     Sodium 04/23/2022 145     Potassium 04/23/2022 4 2     Chloride 04/23/2022 104     CO2 04/23/2022 38 (A)    ANION GAP 04/23/2022 3 (A)    BUN 04/23/2022 19     Creatinine 04/23/2022 0 99     Glucose 04/23/2022 117     Calcium 04/23/2022 8 8     Corrected Calcium 04/23/2022 9 4     AST 04/23/2022 22     ALT 04/23/2022 19     Alkaline Phosphatase 04/23/2022 76     Total Protein 04/23/2022 7 5     Albumin 04/23/2022 3 2 (A)    Total Bilirubin 04/23/2022 0 74     eGFR 04/23/2022 69     LACTIC ACID 04/23/2022 1 1     Protime 04/23/2022 13 9     INR 04/23/2022 1 10     PTT 04/23/2022 30     Procalcitonin 04/23/2022 0 05     Color, UA 04/23/2022 Yellow     Clarity, UA 04/23/2022 Clear     Specific Gravity, UA 04/23/2022 1 015     pH, UA 04/23/2022 7 0     Leukocytes, UA 04/23/2022 Negative     Nitrite, UA 04/23/2022 Negative     Protein, UA 04/23/2022 Negative     Glucose, UA 04/23/2022 Negative  Ketones, UA 04/23/2022 Negative     Urobilinogen, UA 04/23/2022 0 2     Bilirubin, UA 04/23/2022 Negative     Blood, UA 04/23/2022 Negative     Blood Culture 04/23/2022 No Growth After 5 Days   Blood Culture 04/23/2022 No Growth After 5 Days       hs TnI 0hr 04/23/2022 11     SARS-CoV-2 04/23/2022 Negative     INFLUENZA A PCR 04/23/2022 Negative     INFLUENZA B PCR 04/23/2022 Negative     RSV PCR 04/23/2022 Negative     NT-proBNP 04/23/2022 992 (A)    hs TnI 2hr 04/23/2022 14     Delta 2hr hsTnI 04/23/2022 3     hs TnI 4hr 04/23/2022 15     Delta 4hr hsTnI 04/23/2022 4     Ventricular Rate 04/23/2022 63     Atrial Rate 04/23/2022 63     MT Interval 04/23/2022 168     QRSD Interval 04/23/2022 78     QT Interval 04/23/2022 400     QTC Interval 04/23/2022 409     P Axis 04/23/2022 70     QRS Axis 04/23/2022 92     T Wave Axis 04/23/2022 70     Ventricular Rate 04/23/2022 56     Atrial Rate 04/23/2022 56     MT Interval 04/23/2022 160     QRSD Interval 04/23/2022 82     QT Interval 04/23/2022 446     QTC Interval 04/23/2022 430     P Axis 04/23/2022 74     QRS Axis 04/23/2022 88     T Wave Axis 04/23/2022 61     Procalcitonin 04/24/2022 0 10     Sodium 04/24/2022 143     Potassium 04/24/2022 3 9     Chloride 04/24/2022 100     CO2 04/24/2022 38 (A)    ANION GAP 04/24/2022 5     BUN 04/24/2022 16     Creatinine 04/24/2022 0 91     Glucose 04/24/2022 99     Calcium 04/24/2022 8 9     eGFR 04/24/2022 77     WBC 04/24/2022 7 88     RBC 04/24/2022 4 27     Hemoglobin 04/24/2022 12 8     Hematocrit 04/24/2022 45 0     MCV 04/24/2022 105 (A)    MCH 04/24/2022 30 0     MCHC 04/24/2022 28 4 (A)    RDW 04/24/2022 14 6     Platelets 43/01/0620 133 (A)    MPV 04/24/2022 9 0     NT-proBNP 04/25/2022 602 (A)    Sodium 04/25/2022 142     Potassium 04/25/2022 3 6     Chloride 04/25/2022 96 (A)    CO2 04/25/2022 42 (A)    ANION GAP 04/25/2022 4     BUN 04/25/2022 17     Creatinine 04/25/2022 0 91     Glucose 04/25/2022 96     Calcium 04/25/2022 9 1     eGFR 04/25/2022 77      Imaging: No results found  Review of Systems:  Review of Systems   Unable to perform ROS: Other       Physical Exam:  Physical Exam  Vitals reviewed  Constitutional:       Appearance: Normal appearance  HENT:      Head: Normocephalic  Neck:      Comments: No JVD noted  Cardiovascular:      Rate and Rhythm: Regular rhythm  Bradycardia present  Pulses: Normal pulses  Heart sounds: Normal heart sounds  Pulmonary:      Comments: Poor inspiratory effort   diminished breath sounds bilateral bases  Abdominal:      General: Bowel sounds are normal       Palpations: Abdomen is soft  Musculoskeletal:         General: Normal range of motion  Cervical back: Normal range of motion and neck supple  Right lower leg: Edema present  Left lower leg: Edema present  Comments: Trace bilateral ankle edema   Skin:     General: Skin is warm and dry  Capillary Refill: Capillary refill takes less than 2 seconds  Neurological:      General: No focal deficit present  Mental Status: He is alert and oriented to person, place, and time  Psychiatric:         Mood and Affect: Mood normal          Behavior: Behavior normal          Discussion/Summary:  1  Chronic HFpEF LVEF 73% NYHA class III stage C- Weight at home and in the office is 140 pounds, appears dry weight  Continue Lasix 20 mg every other day, K-Dur 20 mEq every other day, metoprolol tartrate 50 mg q 12 hours,  2 g sodium diet 1500 cc fluid restriction and daily weights  2  Hypertension RUE sitting 130/70  Continue Lasix 20 mg every other day, K-Dur 20 mEq every other day, metoprolol tartrate 50 mg q 12 hours, dash diet  3  Hyperlipidemia 3/14/22 , TG 84, HDL 26, LDL 84 -  Continue on Lipitor 10 mg daily, dash diet  4   COPD on oxygen 3LNC around the clock continues on Symbicort 160-4 5 mcg/ ACT 2 puffs 2 times a day Spiriva 18 mcg daily Proventil follow-up  With pulmonary  5   PHTN 62mmHg WHO group II ( LVDD) group III (COPD)

## 2022-06-02 ENCOUNTER — OFFICE VISIT (OUTPATIENT)
Dept: CARDIOLOGY CLINIC | Facility: CLINIC | Age: 87
End: 2022-06-02
Payer: MEDICARE

## 2022-06-02 VITALS
SYSTOLIC BLOOD PRESSURE: 128 MMHG | HEIGHT: 65 IN | OXYGEN SATURATION: 87 % | WEIGHT: 140.4 LBS | HEART RATE: 52 BPM | BODY MASS INDEX: 23.39 KG/M2 | DIASTOLIC BLOOD PRESSURE: 70 MMHG

## 2022-06-02 DIAGNOSIS — I27.20 PULMONARY HYPERTENSION (HCC): ICD-10-CM

## 2022-06-02 DIAGNOSIS — I10 PRIMARY HYPERTENSION: ICD-10-CM

## 2022-06-02 DIAGNOSIS — E78.2 MIXED HYPERLIPIDEMIA: ICD-10-CM

## 2022-06-02 DIAGNOSIS — J44.9 COPD WITH HYPOXIA (HCC): ICD-10-CM

## 2022-06-02 DIAGNOSIS — R09.02 COPD WITH HYPOXIA (HCC): ICD-10-CM

## 2022-06-02 DIAGNOSIS — I50.32 CHRONIC DIASTOLIC HEART FAILURE (HCC): Primary | ICD-10-CM

## 2022-06-02 PROCEDURE — 99215 OFFICE O/P EST HI 40 MIN: CPT | Performed by: NURSE PRACTITIONER

## 2022-06-02 NOTE — PATIENT INSTRUCTIONS
Maintain a 2 gram daily sodium diet and 1500 ml daily fluid restriction  Check daily weights  If you gain 3 pounds in one day, 5 pounds in one week, or experience worsening shortness of breath or increasing lower leg swelling  Please call the heart failure office at [phone]    Please bring a  list of your current medications and daily weights to the office visit

## 2022-07-26 DIAGNOSIS — I71.20 THORACIC AORTIC ANEURYSM WITHOUT RUPTURE: ICD-10-CM

## 2022-07-26 RX ORDER — ASPIRIN 81 MG/1
81 TABLET ORAL DAILY
Qty: 30 TABLET | Refills: 5 | Status: SHIPPED | OUTPATIENT
Start: 2022-07-26 | End: 2022-10-11 | Stop reason: ALTCHOICE

## 2022-09-06 ENCOUNTER — OFFICE VISIT (OUTPATIENT)
Dept: CARDIOLOGY CLINIC | Facility: CLINIC | Age: 87
End: 2022-09-06
Payer: MEDICARE

## 2022-09-06 VITALS
OXYGEN SATURATION: 91 % | BODY MASS INDEX: 24.68 KG/M2 | WEIGHT: 134.1 LBS | HEIGHT: 62 IN | HEART RATE: 56 BPM | SYSTOLIC BLOOD PRESSURE: 142 MMHG | DIASTOLIC BLOOD PRESSURE: 92 MMHG

## 2022-09-06 DIAGNOSIS — I50.33 ACUTE ON CHRONIC DIASTOLIC CONGESTIVE HEART FAILURE (HCC): ICD-10-CM

## 2022-09-06 DIAGNOSIS — Z86.79 STATUS POST ENDOSCOPIC REPAIR OF THORACIC AORTIC ANEURYSM (TAA): ICD-10-CM

## 2022-09-06 DIAGNOSIS — Z98.890 STATUS POST ENDOSCOPIC REPAIR OF THORACIC AORTIC ANEURYSM (TAA): ICD-10-CM

## 2022-09-06 DIAGNOSIS — J96.11 CHRONIC RESPIRATORY FAILURE WITH HYPOXIA (HCC): ICD-10-CM

## 2022-09-06 DIAGNOSIS — I71.20 THORACIC AORTIC ANEURYSM WITHOUT RUPTURE: ICD-10-CM

## 2022-09-06 DIAGNOSIS — I71.9 DESCENDING AORTIC ANEURYSM (HCC): Primary | ICD-10-CM

## 2022-09-06 DIAGNOSIS — I10 BENIGN ESSENTIAL HYPERTENSION: ICD-10-CM

## 2022-09-06 DIAGNOSIS — E78.2 MIXED HYPERLIPIDEMIA: ICD-10-CM

## 2022-09-06 PROCEDURE — 99213 OFFICE O/P EST LOW 20 MIN: CPT | Performed by: INTERNAL MEDICINE

## 2022-09-06 NOTE — PROGRESS NOTES
Cardiology Follow Up    [facility]  5/32/6438  [phone]  [facility] [place]  [address]  [address]  [phone]    1  Descending aortic aneurysm ([address] )     2  Thoracic aortic aneurysm without rupture ([address] )     3  Benign essential hypertension     4  Acute on chronic diastolic congestive heart failure ([address] )     5  Chronic respiratory failure with hypoxia (HCC)     6  Mixed hyperlipidemia     7  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)         Interval History:  Cardiology follow-up  Her she appears to be clinically stable, last admission on 04/24 respiratory failure in the setting of acute on chronic diastolic congestive failure and superimposed pneumonia  Admits to be compliant with his medical regimen as well as fluid restriction sodium restriction  His dry weight after discharge was 131  Lipids last checked total cholesterol of 127 with an HDL 26 and LDL of 84  On medium intensity statin therapy  His diuretic doses low at 20 mg of furosemide daily    Patient was interviewed in Estonian    Patient Active Problem List   Diagnosis    Accelerated essential hypertension    COPD (chronic obstructive pulmonary disease) ([address] )    Descending aortic aneurysm (HCC)    History of DVT (deep vein thrombosis)    Benign essential hypertension    Thoracic aortic aneurysm (HCC)    Chronic respiratory failure with hypoxia (HCC)    Varicose veins of both lower extremities with pain    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HCC)    Pneumonia    Edema of both legs    Snoring    Thoracic aortic aneurysm without rupture ([address] )    Aneurysm, aorta, thoracic ([address] )    Leukocytosis    Thrombocytopenia ([address] )    Hypochloremia    Abdominal pain    Incarcerated right inguinal hernia    Acute on chronic diastolic congestive heart failure ([address] )    Acquired renal cyst of left kidney    Preop exam for internal medicine    Left leg swelling    TIA (transient ischemic attack)    Early satiety    Dysphagia    Altered mental status    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)    Rectal bleeding    Hyperlipidemia    Proctitis    Clostridium difficile infection    Fever due to infection    Lightheadedness    BELLA (acute kidney injury) ([facility] )    CHF (congestive heart failure) ([facility])     Past Medical History:   Diagnosis Date    Acute metabolic encephalopathy 3/54/9492    Appendicolith     Ascending aortic aneurysm ([facility])     3 7    Asthma     BPH (benign prostatic hyperplasia)     CAD (coronary artery disease)     noted on CT scan    COPD (chronic obstructive pulmonary disease) ([facility])     Descending thoracic aortic aneurysm ([facility] )     Diverticulosis     Former tobacco use     GERD (gastroesophageal reflux disease)     History of DVT (deep vein thrombosis)     Left leg    History of transfusion     Hypertension     Inguinal hernia     right    Nephrolithiasis     Oxygen dependent     2LNC    Prostate calculus     PVD (peripheral vascular disease) ([facility])     Ulcer     Varicose vein of leg     b/l     Social History     Socioeconomic History    Marital status: /Civil Union     Spouse name: Not on file    Number of children: Not on file    Years of education: Not on file    Highest education level: Not on file   Occupational History    Not on file   Tobacco Use    Smoking status: Former Smoker     Packs/day: 1 00     Years: 35 00     Pack years: 35 00     Start date:      Quit date:      Years since quittin 6    Smokeless tobacco: Never Used   Vaping Use    Vaping Use: Never used   Substance and Sexual Activity    Alcohol use: Never    Drug use: No    Sexual activity: Never   Other Topics Concern    Not on file   Social History Narrative    ** Merged History Encounter **          Social Determinants of Health     Financial Resource Strain: Not on file   Food Insecurity: No Food Insecurity    Worried About Running Out of Food in the Last Year: Never true    Ran Out of Food in the Last Year: Never true   Transportation Needs: No Transportation Needs    Lack of Transportation (Medical): No    Lack of Transportation (Non-Medical):  No   Physical Activity: Not on file   Stress: Not on file   Social Connections: Not on file   Intimate Partner Violence: Not on file   Housing Stability: Low Risk     Unable to Pay for Housing in the Last Year: No    Number of Places Lived in the Last Year: 1    Unstable Housing in the Last Year: No      Family History   Problem Relation Age of Onset    Tuberculosis Mother     No Known Problems Father     Cancer Sister     Diabetes Family     Hypertension Family      Past Surgical History:   Procedure Laterality Date    CARDIAC SURGERY      ESOPHAGOGASTRODUODENOSCOPY      HERNIA REPAIR Right 1/21/2019    Procedure: REPAIR HERNIA INGUINAL WITH MESH;  Surgeon: Mary Ann Saeed MD;  Location: [facility] MAIN OR;  Service: General    INGUINAL HERNIA REPAIR Bilateral     IR TEVAR  12/27/2018    OK ENDOVASC TAA REINCL SUBCL N/A 12/27/2018    Procedure: TEVAR - endovascular thoracic aortic aneurysm repair;  Surgeon: Asa Rosa MD;  Location:  MAIN OR;  Service: Vascular    THORACIC AORTIC ANEURYSM REPAIR  12/27/2018    VARICOSE VEIN SURGERY Bilateral     vein stripping       Current Outpatient Medications:     acetaminophen (TYLENOL) 325 mg tablet, Take 2 tablets (650 mg total) by mouth every 6 (six) hours as needed for fever (temperature greater than 101 F), Disp: 30 tablet, Rfl: 0    albuterol (2 5 mg/3 mL) 0 083 % nebulizer solution, Take 1 vial (2 5 mg total) by nebulization every 6 (six) hours as needed for wheezing or shortness of breath, Disp: 75 mL, Rfl: 1    albuterol (PROVENTIL HFA,VENTOLIN HFA) 90 mcg/act inhaler, Inhale 2 puffs, Disp: , Rfl:     aspirin (Aspirin 81) 81 mg EC tablet, Take 1 tablet (81 mg total) by mouth daily, Disp: 30 tablet, Rfl: 5    atorvastatin (LIPITOR) 10 mg tablet, Take 1 tablet (10 mg total) by mouth daily, Disp: 30 tablet, Rfl: 0    budesonide-formoterol (SYMBICORT) 160-4 5 mcg/act inhaler, Inhale 2 puffs 2 (two) times a day Rinse mouth after use , Disp: 1 Inhaler, Rfl: 0    ferrous sulfate 325 (65 Fe) mg tablet, Take 325 mg by mouth daily with breakfast, Disp: , Rfl:     furosemide (LASIX) 20 mg tablet, Lasix 20mg daily for 2 days in a row then every other day (Patient taking differently: 20 mg daily), Disp: 90 tablet, Rfl: 3    metoprolol tartrate (LOPRESSOR) 50 mg tablet, Take 1 tablet (50 mg total) by mouth every 12 (twelve) hours, Disp: 60 tablet, Rfl: 0    pantoprazole (PROTONIX) 40 mg tablet, Take 1 tablet (40 mg total) by mouth daily, Disp: 30 tablet, Rfl: 0    potassium chloride (K-DUR,KLOR-CON) 20 mEq tablet, Daily for 3 days then EOD when taking Lasix pill, Disp: 33 tablet, Rfl: 4    tiotropium (SPIRIVA) 18 mcg inhalation capsule, Place 1 capsule (18 mcg total) into inhaler and inhale daily, Disp: 30 capsule, Rfl: 0    benzonatate (TESSALON PERLES) 100 mg capsule, Take 1 capsule (100 mg total) by mouth 3 (three) times a day as needed for cough, Disp: 20 capsule, Rfl: 0    melatonin 3 mg, Take 2 tablets (6 mg total) by mouth daily at bedtime (Patient not taking: No sig reported), Disp: 90 tablet, Rfl: 0  Allergies   Allergen Reactions    Penicillins Hives, Itching and Rash    Lisinopril Rash     Side pains and rash        Labs:  Admission on 04/23/2022, Discharged on 04/25/2022   Component Date Value    WBC 04/23/2022 8 18     RBC 04/23/2022 4 19     Hemoglobin 04/23/2022 12 5     Hematocrit 04/23/2022 43 9     MCV 04/23/2022 105 (A)    MCH 04/23/2022 29 8     MCHC 04/23/2022 28 5 (A)    RDW 04/23/2022 14 7     MPV 04/23/2022 8 9     Platelets 35/59/7331 135 (A)    nRBC 04/23/2022 0     Neutrophils Relative 04/23/2022 68     Immat GRANS % 04/23/2022 0     Lymphocytes Relative 04/23/2022 11 (A)    Monocytes Relative 04/23/2022 12     Eosinophils Relative 04/23/2022 8 (A)    Basophils Relative 04/23/2022 1     Neutrophils Absolute 04/23/2022 5 61     Immature Grans Absolute 04/23/2022 0 03     Lymphocytes Absolute 04/23/2022 0 87     Monocytes Absolute 04/23/2022 0 97     Eosinophils Absolute 04/23/2022 0 65 (A)    Basophils Absolute 04/23/2022 0 05     Sodium 04/23/2022 145     Potassium 04/23/2022 4 2     Chloride 04/23/2022 104     CO2 04/23/2022 38 (A)    ANION GAP 04/23/2022 3 (A)    BUN 04/23/2022 19     Creatinine 04/23/2022 0 99     Glucose 04/23/2022 117     Calcium 04/23/2022 8 8     Corrected Calcium 04/23/2022 9 4     AST 04/23/2022 22     ALT 04/23/2022 19     Alkaline Phosphatase 04/23/2022 76     Total Protein 04/23/2022 7 5     Albumin 04/23/2022 3 2 (A)    Total Bilirubin 04/23/2022 0 74     eGFR 04/23/2022 69     LACTIC ACID 04/23/2022 1 1     Protime 04/23/2022 13 9     INR 04/23/2022 1 10     PTT 04/23/2022 30     Procalcitonin 04/23/2022 0 05     Color, UA 04/23/2022 Yellow     Clarity, UA 04/23/2022 Clear     Specific Gravity, UA 04/23/2022 1 015     pH, UA 04/23/2022 7 0     Leukocytes, UA 04/23/2022 Negative     Nitrite, UA 04/23/2022 Negative     Protein, UA 04/23/2022 Negative     Glucose, UA 04/23/2022 Negative     Ketones, UA 04/23/2022 Negative     Urobilinogen, UA 04/23/2022 0 2     Bilirubin, UA 04/23/2022 Negative     Occult Blood, UA 04/23/2022 Negative     Blood Culture 04/23/2022 No Growth After 5 Days   Blood Culture 04/23/2022 No Growth After 5 Days       hs TnI 0hr 04/23/2022 11     SARS-CoV-2 04/23/2022 Negative     INFLUENZA A PCR 04/23/2022 Negative     INFLUENZA B PCR 04/23/2022 Negative     RSV PCR 04/23/2022 Negative     NT-proBNP 04/23/2022 992 (A)    hs TnI 2hr 04/23/2022 14     Delta 2hr hsTnI 04/23/2022 3     hs TnI 4hr 04/23/2022 15     Delta 4hr hsTnI 04/23/2022 4     Ventricular Rate 04/23/2022 63     Atrial Rate 04/23/2022 63     LA Interval 04/23/2022 168     QRSD Interval 04/23/2022 78     QT Interval 04/23/2022 400     QTC Interval 04/23/2022 409     P Axis 04/23/2022 70     QRS Axis 04/23/2022 92     T Wave Axis 04/23/2022 70     Ventricular Rate 04/23/2022 56     Atrial Rate 04/23/2022 56     LA Interval 04/23/2022 160     QRSD Interval 04/23/2022 82     QT Interval 04/23/2022 446     QTC Interval 04/23/2022 430     P Axis 04/23/2022 74     QRS Axis 04/23/2022 88     T Wave Axis 04/23/2022 61     Procalcitonin 04/24/2022 0 10     Sodium 04/24/2022 143     Potassium 04/24/2022 3 9     Chloride 04/24/2022 100     CO2 04/24/2022 38 (A)    ANION GAP 04/24/2022 5     BUN 04/24/2022 16     Creatinine 04/24/2022 0 91     Glucose 04/24/2022 99     Calcium 04/24/2022 8 9     eGFR 04/24/2022 77     WBC 04/24/2022 7 88     RBC 04/24/2022 4 27     Hemoglobin 04/24/2022 12 8     Hematocrit 04/24/2022 45 0     MCV 04/24/2022 105 (A)    MCH 04/24/2022 30 0     MCHC 04/24/2022 28 4 (A)    RDW 04/24/2022 14 6     Platelets 95/16/6333 133 (A)    MPV 04/24/2022 9 0     NT-proBNP 04/25/2022 602 (A)    Sodium 04/25/2022 142     Potassium 04/25/2022 3 6     Chloride 04/25/2022 96 (A)    CO2 04/25/2022 42 (A)    ANION GAP 04/25/2022 4     BUN 04/25/2022 17     Creatinine 04/25/2022 0 91     Glucose 04/25/2022 96     Calcium 04/25/2022 9 1     eGFR 04/25/2022 77    No results displayed because visit has over 200 results  Imaging: No results found  Review of Systems:  Review of Systems   Constitutional: Positive for fatigue  Respiratory: Positive for shortness of breath  Negative for apnea, wheezing and stridor  Cardiovascular: Negative for chest pain, palpitations and leg swelling  Musculoskeletal: Positive for gait problem  Neurological: Positive for weakness     Hematological: Does not bruise/bleed easily  Physical Exam:  Physical Exam  Vitals reviewed  Constitutional:       General: He is not in acute distress  Appearance: He is ill-appearing (Chronically ill )  He is not toxic-appearing or diaphoretic  Neck:      Vascular: No carotid bruit  Cardiovascular:      Rate and Rhythm: Normal rate and regular rhythm  Heart sounds: No murmur heard  No friction rub  No gallop  Pulmonary:      Breath sounds: No stridor  No wheezing, rhonchi or rales  Comments: Mild tachypnea on oxygen  Musculoskeletal:      Right lower leg: No edema  Left lower leg: No edema  Psychiatric:         Mood and Affect: Mood normal          Discussion/Summary:  Chronic diastolic congestive failure, no recent admission or hospitalization  In the setting of concomitant chronic respiratory failure oxygen dependent  Echocardiogram during the last admission revealed normal left systolic function with significant left hypertrophy stage I diastolic function interestingly so which I do not believe  There was mild-to-moderate tricuspid physicians he with moderate to significant pulmonary hypertension estimated pulmonary systolic pressures in the 60 systolic  Appears euvolemic, continue current medications  , pharmacological stress test 2 years ago suggested no ischemia  It  Thoracic descending aneurysm, status post endovascular repair  CT scan last 2 years ago revealed proximal edge migration but no endoleaks  Ascending aorta 37 mm  This note was completed in part utilizing [employer] direct voice recognition software  Grammatical errors, random word insertion, spelling mistakes, and incomplete sentences may be an occasional consequence of the system secondary to software limitations, ambient noise and hardware issues  At the time of dictation, efforts were made to edit, clarify and /or correct errors  Please read the chart carefully and recognize, using context, where substitutions have occurred  If you have any questions or concerns about the context, text or information contained within the body of this dictation, please contact myself, the provider, for further clarification

## 2022-09-16 ENCOUNTER — HOSPITAL ENCOUNTER (OUTPATIENT)
Dept: CT IMAGING | Facility: HOSPITAL | Age: 87
Discharge: HOME | End: 2022-09-16
Payer: MEDICARE

## 2022-09-16 DIAGNOSIS — Z98.890 STATUS POST ENDOSCOPIC REPAIR OF THORACIC AORTIC ANEURYSM (TAA): ICD-10-CM

## 2022-09-16 DIAGNOSIS — Z86.79 STATUS POST ENDOSCOPIC REPAIR OF THORACIC AORTIC ANEURYSM (TAA): ICD-10-CM

## 2022-09-16 PROCEDURE — G1004 CDSM NDSC: HCPCS

## 2022-09-16 PROCEDURE — 74176 CT ABD & PELVIS W/O CONTRAST: CPT

## 2022-09-16 PROCEDURE — 71250 CT THORAX DX C-: CPT

## 2022-09-22 ENCOUNTER — OFFICE VISIT (OUTPATIENT)
Dept: CARDIAC SURGERY | Facility: CLINIC | Age: 87
End: 2022-09-22
Payer: MEDICARE

## 2022-09-22 VITALS
TEMPERATURE: 98.1 F | HEART RATE: 51 BPM | SYSTOLIC BLOOD PRESSURE: 186 MMHG | BODY MASS INDEX: 24.78 KG/M2 | WEIGHT: 135.5 LBS | DIASTOLIC BLOOD PRESSURE: 87 MMHG

## 2022-09-22 DIAGNOSIS — Z98.890 STATUS POST ENDOSCOPIC REPAIR OF THORACIC AORTIC ANEURYSM (TAA): Primary | ICD-10-CM

## 2022-09-22 DIAGNOSIS — I71.20 THORACIC AORTIC ANEURYSM WITHOUT RUPTURE: ICD-10-CM

## 2022-09-22 DIAGNOSIS — Z86.79 STATUS POST ENDOSCOPIC REPAIR OF THORACIC AORTIC ANEURYSM (TAA): Primary | ICD-10-CM

## 2022-09-22 PROCEDURE — 99214 OFFICE O/P EST MOD 30 MIN: CPT | Performed by: NURSE PRACTITIONER

## 2022-09-22 NOTE — LETTER
September 22, 2022     Ashley Olson, [address]  [address] [address]    Patient: Juan C Sanches   YOB: 1935   Date of Visit: 9/22/2022       Dear Dr Darrell Bassett: Thank you for referring Juan C Sanches to me for evaluation  Below are my notes for this consultation  If you have questions, please do not hesitate to call me  I look forward to following your patient along with you  Sincerely,        Fatuma Waters MD        CC: MD Madhuri Crespo, [address] [place] [address]  9/22/2022  9:54 AM  Attested  [facility]  Juan C Sanches 82 y o  male MRN: [id number]    Physician Requesting Consult: No att  providers found    Reason for Consult / Principal Problem:  F/U s/p Alveda Stevie for descending thoracic aortic aneurysm    History of Present Illness: Juan C Sanches is a 82y o  year old male who presents today for ongoing surveillance s/p TEVAR in December 2018 for a descending thoracic aortic aneurysm  Patient's PMHx is notable for CAD, CHF, HTN, HLD, COPD (oxygen dependent), PNA (3/2022), TIA, PVD, h/o DVT, BPH and nephrolithiasis  Patient accompanied by his wife and son today  His son serves as   Patient and wife ar primary Maltese speaking  Patient denies any new problems and denies chest pain, SOB, lightheadedness, back pain, abdominal pain, extremity pain  weakness or numbness   He usus his oxygen continuously and is wearing it today, running at 3Lpm      Past Medical History:  Past Medical History:   Diagnosis Date    Acute metabolic encephalopathy 4/79/7157    Appendicolith     Ascending aortic aneurysm (HCC)     3 7    Asthma     BPH (benign prostatic hyperplasia)     CAD (coronary artery disease)     noted on CT scan    COPD (chronic obstructive pulmonary disease) (HCC)     Descending thoracic aortic aneurysm ([address] )     Diverticulosis     Former tobacco use     GERD (gastroesophageal reflux disease)     History of DVT (deep vein thrombosis)     Left leg    History of transfusion     Hypertension     Inguinal hernia     right    Nephrolithiasis     Oxygen dependent     2LNC    Prostate calculus     PVD (peripheral vascular disease) (HCC)     Ulcer     Varicose vein of leg     b/l         Past Surgical History:   Past Surgical History:   Procedure Laterality Date    CARDIAC SURGERY      ESOPHAGOGASTRODUODENOSCOPY      HERNIA REPAIR Right 2019    Procedure: REPAIR HERNIA INGUINAL WITH MESH;  Surgeon: Kyle Collins MD;  Location: [address] [place] [address] MAIN OR;  Service: General    INGUINAL HERNIA REPAIR Bilateral     IR TEVAR  2018    ND ENDOVASC TAA REINCL SUBCL N/A 2018    Procedure: TEVAR - endovascular thoracic aortic aneurysm repair;  Surgeon: Luna Ortega MD;  Location:  MAIN OR;  Service: Vascular    THORACIC AORTIC ANEURYSM REPAIR  2018    VARICOSE VEIN SURGERY Bilateral     vein stripping         Family History:  Family History   Problem Relation Age of Onset    Tuberculosis Mother     No Known Problems Father     Cancer Sister     Diabetes Family     Hypertension Family          Social History:    Social History     Substance and Sexual Activity   Alcohol Use Never     Social History     Substance and Sexual Activity   Drug Use No     Social History     Tobacco Use   Smoking Status Former Smoker    Packs/day: 1 00    Years: 35 00    Pack years: 35 00    Start date:     Quit date:     Years since quittin 7   Smokeless Tobacco Never Used         Home Medications:   Prior to Admission medications    Medication Sig Start Date End Date Taking?  Authorizing Provider   acetaminophen (TYLENOL) 325 mg tablet Take 2 tablets (650 mg total) by mouth every 6 (six) hours as needed for fever (temperature greater than 101 F) 18  Yes Lucas Munoz PA-C   albuterol (2 5 mg/3 mL) 0 083 % nebulizer solution Take 1 vial (2 5 mg total) by nebulization every 6 (six) hours as needed for wheezing or shortness of breath 6/26/18  Yes Brenna Nguyen MD   albuterol (PROVENTIL HFA,[facility]) 90 mcg/act inhaler Inhale 2 puffs 8/8/17  Yes Historical Provider, MD   aspirin (Aspirin 81) 81 mg EC tablet Take 1 tablet (81 mg total) by mouth daily 7/26/22  Yes RAQUEL Crawford   atorvastatin (LIPITOR) 10 mg tablet Take 1 tablet (10 mg total) by mouth daily 6/16/20  Yes Colt Bright DO   benzonatate (TESSALON PERLES) 100 mg capsule Take 1 capsule (100 mg total) by mouth 3 (three) times a day as needed for cough 4/25/22  Yes Ricardo Ayala PA-C   budesonide-formoterol [facility]) 160-4 5 mcg/act inhaler Inhale 2 puffs 2 (two) times a day Rinse mouth after use  6/16/20  Yes Colt Bright DO   ferrous sulfate 325 (65 Fe) mg tablet Take 325 mg by mouth daily with breakfast   Yes Historical Provider, MD   furosemide (LASIX) 20 mg tablet Lasix 20mg daily for 2 days in a row then every other day  Patient taking differently: 20 mg daily 5/19/22  Yes RAQUEL Crawford   metoprolol tartrate (LOPRESSOR) 50 mg tablet Take 1 tablet (50 mg total) by mouth every 12 (twelve) hours 6/16/20  Yes Colt Bright DO   pantoprazole (PROTONIX) 40 mg tablet Take 1 tablet (40 mg total) by mouth daily 6/16/20  Yes Colt Bright DO   potassium chloride (K-DUR,KLOR-CON) 20 mEq tablet Daily for 3 days then EOD when taking Lasix pill 5/19/22  Yes RAQUEL Crawford   tiotropium (SPIRIVA) 18 mcg inhalation capsule Place 1 capsule (18 mcg total) into inhaler and inhale daily 6/16/20  Yes Colt Bright DO   melatonin 3 mg Take 2 tablets (6 mg total) by mouth daily at bedtime  Patient not taking: No sig reported 6/16/20   Brenda Cornell DO       Allergies:   Allergies   Allergen Reactions    Penicillins Hives, Itching and Rash    Lisinopril Rash     Side pains and rash        Review of Systems:   Review of Systems - History obtained from chart review, the patient and son  General ROS: negative  Psychological ROS: negative  Ophthalmic ROS: positive for - uses glasses  ENT ROS: negative  Allergy and Immunology ROS: negative  Hematological and Lymphatic ROS: negative  Endocrine ROS: negative  Breast ROS: negative  Respiratory ROS: no cough, shortness of breath, or wheezing  Cardiovascular ROS: no chest pain or dyspnea on exertion  Gastrointestinal ROS: no abdominal pain, change in bowel habits, or black or bloody stools  Genito-Urinary ROS: no dysuria, trouble voiding, or hematuria  Musculoskeletal ROS: negative  Neurological ROS: no TIA or stroke symptoms  Dermatological ROS: negative    Vital Signs:   Vitals:    09/22/22 0924   BP: (!) 186/87   BP Location: Left arm   Patient Position: Sitting   Cuff Size: Standard   Pulse: (!) 51   Temp: 98 1 °F (36 7 °C)   Weight: 61 5 kg (135 lb 8 oz)       Physical Exam:  General: Alert, oriented, frail, muscle wasting, no acute distress  HEENT/NECK:  PERRLA  No jugular venous distention  Cardiac:Regular rate and rhythm, no murmurs rubs or gallops  Carotid arteries: 2+ pulses, no bruits  Pulmonary:  Breath sounds clear bilaterally  Abdomen:  Non-tender, Non-distended  Positive bowel sounds  Upper extremities: 2+ radial pulses; brisk capillary refill  Lower extremities: Extremities warm/dry  PT/DP pulses 1+ bilaterally  Trace edema B/L  Neuro: Alert and oriented X 3  Sensation is grossly intact  No focal deficits  Musculoskeletal: MAEE, stable gait  Skin: Warm/Dry, without rashes or lesions      Lab Results:   Lab Results   Component Value Date    SODIUM 142 04/25/2022    K 3 6 04/25/2022    CL 96 (L) 04/25/2022    CO2 42 (H) 04/25/2022    BUN 17 04/25/2022    CREATININE 0 91 04/25/2022    GLUC 96 04/25/2022    CALCIUM 9 1 04/25/2022     Lab Results   Component Value Date    HGBA1C 6 2 (H) 06/13/2020     Lab Results   Component Value Date    CKTOTAL 52 06/11/2020    TROPONINI 0 02 08/05/2021       Imaging Studies:     CT Chest/Abd/Pelvis:  9/16/22    Stable appearance of aortic aneurysm status post endograft repair  Echocardiogram: 3/14/22      Left Ventricle: Left ventricular cavity size is normal  Wall thickness is moderate to severely increased  There is moderate to severe concentric hypertrophy  The left ventricular ejection fraction is 73%  Systolic function is normal  Wall motion is normal  Diastolic function is mildly abnormal, consistent with grade I (abnormal) relaxation  Left atrial filling pressure is normal     Right Ventricle: Right ventricular cavity size is mildly dilated  Systolic function is normal     Right Atrium: The atrium is mildly dilated  *The Aortic Valve is trileaflet  The leaflets are mildly thickened  The                   leaflets are mildly calcified  The leaflets exhibit normal mobility  There        is no evidence of regurgitation  There is no evidence of stenosis    Tricuspid Valve: There is mild to moderate regurgitation    Pulmonary Artery: The estimated pulmonary artery systolic pressure is 63 2 mmHg  The pulmonary artery systolic pressure is moderately increased      I have personally reviewed pertinent films in PACS     PCP and Cardiology notes reviewed       Assessment:  Patient Active Problem List    Diagnosis Date Noted    CHF (congestive heart failure) ([facility] ) 04/23/2022    BELLA (acute kidney injury) ([facility] ) 03/16/2022    Lightheadedness 03/12/2022    Fever due to infection 08/08/2021    Clostridium difficile infection 08/07/2021    Proctitis 08/06/2021    Rectal bleeding 08/05/2021    Hyperlipidemia 08/05/2021    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) 09/24/2020    Altered mental status     Early satiety 06/12/2020    Dysphagia 06/12/2020    TIA (transient ischemic attack) 05/28/2020    Left leg swelling 03/22/2019    Acquired renal cyst of left kidney 01/21/2019    Preop exam for internal medicine 01/21/2019    Abdominal pain 01/19/2019    Incarcerated right inguinal hernia 01/19/2019  Acute on chronic diastolic congestive heart failure ([facility] ) 01/19/2019    Leukocytosis 12/29/2018    Thrombocytopenia ([place] [address] ) 12/29/2018    Hypochloremia 12/29/2018    Aneurysm, aorta, thoracic ([place] [address] )     Thoracic aortic aneurysm without rupture ([place] [address] ) 11/19/2018    Edema of both legs 08/02/2018    Snoring 08/02/2018    Pneumonia 07/18/2018    Varicose veins of both lower extremities with pain 05/23/2018    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/23/2018    Chronic respiratory failure with hypoxia (HCC) 04/02/2018    Accelerated essential hypertension 02/01/2018    COPD (chronic obstructive pulmonary disease) ([place] [address] ) 02/01/2018    Descending aortic aneurysm ([place] [address] ) 02/01/2018    History of DVT (deep vein thrombosis) 02/01/2018    Benign essential hypertension 08/02/2017    Thoracic aortic aneurysm ([place] [address] ) 08/02/2017       Impressison/Plan:    CT imaging performed prior to this visit demonstrates stable appearance of thoracic aortic endograft  These findings were confirmed and shared with the patient today  In light of patient's advanced age and comobidities, no further follow up or imaging is recommended  Contiunue routine follow up with PCP and Cardiology  Rubencarina Moscoso , his wife and son were comfortable with our recommendations, and their questions were answered to their satisfaction  Aortic Aneurysm Instructions were provided to the patient as follows:    1  No lifting more than 50 pounds  Regular aerobic exercise permitted and recommended  2  Maintain a controlled blood pressure with a goal of less than 120/80  3  Follow up in [facility] as recommended with radiology follow up as instructed  4  Report to the ER or call 911 immediately with the following signs / symptoms: sudden onset of back pain, chest pain or shortness of breath        Routine referral to gastroenterology for colonoscopy screening was not indicated, as the patient is over 78years old    SIGNATURE: RAQUEL Pérez  DATE: September 22, 2022  TIME: 9:37 AM  Attestation signed by Tyree Moore MD at 9/22/2022  1:04 PM:  Patient seen and evaluated with Noy Castellon / JEREMIE  I agree with the above assessment and plan with the following additions  The patient is an 72-year-old man for years status post TEVAR for a descending thoracic aorta aneurysm, he is here today on a 2 year follow-up  CT of the chest on 09/16/2022 shows normal postoperative changes, the aneurysm sac as completely resolve  There is no need for further intervention, there is no need for further follow-up  This note was completed in part utilizing [employer] direct voice recognition software  Grammatical errors, random word insertion, spelling mistakes, and incomplete sentences may be an occasional consequence of the system secondary to software limitations, ambient noise and hardware issues  At the time of dictation, efforts were made to edit, clarify and /or correct errors  Please read the chart carefully and recognize, using context, where substitutions have occurred  If you have any questions or concerns about the context, text or information contained within the body of this dictation, please contact myself, the provider, for further clarification

## 2022-09-22 NOTE — PROGRESS NOTES
Aortic Clinic  [facility] 82 y o  male MRN: [id number]    Physician Requesting Consult: No att  providers found    Reason for Consult / Principal Problem:  F/U s/p TEAVR for descending thoracic aortic aneurysm    History of Present Illness: [facility] is a 82y o  year old male who presents today for ongoing surveillance s/p TEVAR in December 2018 for a descending thoracic aortic aneurysm  Patient's PMHx is notable for CAD, CHF, HTN, HLD, COPD (oxygen dependent), PNA (3/2022), TIA, PVD, h/o DVT, BPH and nephrolithiasis  Patient accompanied by his wife and son today  His son serves as   Patient and wife ar primary Kazakh speaking  Patient denies any new problems and denies chest pain, SOB, lightheadedness, back pain, abdominal pain, extremity pain  weakness or numbness   He usus his oxygen continuously and is wearing it today, running at 3Lpm      Past Medical History:  Past Medical History:   Diagnosis Date    Acute metabolic encephalopathy 2/90/9906    Appendicolith     Ascending aortic aneurysm (HCC)     3 7    Asthma     BPH (benign prostatic hyperplasia)     CAD (coronary artery disease)     noted on CT scan    [facility]) [facility]     Descending thoracic aortic aneurysm ([address] )     Diverticulosis     Former tobacco use     GERD (gastroesophageal reflux disease)     History of DVT (deep vein thrombosis)     Left leg    History of transfusion     Hypertension     Inguinal hernia     right    Nephrolithiasis     Oxygen dependent     2LNC    Prostate calculus     PVD [facility]peripheral vascular disease) ([facility])     Ulcer     Varicose vein of leg     b/l         Past Surgical History:   Past Surgical History:   Procedure Laterality Date    CARDIAC SURGERY      ESOPHAGOGASTRODUODENOSCOPY      HERNIA REPAIR Right 1/21/2019    Procedure: REPAIR HERNIA INGUINAL WITH MESH;  Surgeon: Maribeth Machado MD;  Location:  MAIN OR;  Service: General  INGUINAL HERNIA REPAIR Bilateral     IR TEVAR  2018    TX ENDOVASC TAA REINCL SUBCL N/A 2018    Procedure: TEVAR - endovascular thoracic aortic aneurysm repair;  Surgeon: Shruthi Nicholson MD;  Location: [facility];  Service: Vascular    THORACIC AORTIC ANEURYSM REPAIR  2018    VARICOSE VEIN SURGERY Bilateral     vein stripping         Family History:  Family History   Problem Relation Age of Onset    Tuberculosis Mother     No Known Problems Father     Cancer Sister     Diabetes Family     Hypertension Family          Social History:    Social History     Substance and Sexual Activity   Alcohol Use Never     Social History     Substance and Sexual Activity   Drug Use No     Social History     Tobacco Use   Smoking Status Former Smoker    Packs/day: 1 00    Years: 35 00    Pack years: 35 00    Start date:     Quit date:     Years since quittin 7   Smokeless Tobacco Never Used         Home Medications:   Prior to Admission medications    Medication Sig Start Date End Date Taking?  Authorizing Provider   acetaminophen (TYLENOL) 325 mg tablet Take 2 tablets (650 mg total) by mouth every 6 (six) hours as needed for fever (temperature greater than 101 F) 18  Yes Lucas Munoz PA-C   albuterol (2 5 mg/3 mL) 0 083 % nebulizer solution Take 1 vial (2 5 mg total) by nebulization every 6 (six) hours as needed for wheezing or shortness of breath 18  Yes Sharan Sanchez MD   albuterol (PROVENTIL HFA,[facility]) 90 mcg/act inhaler Inhale 2 puffs 17  Yes Historical Provider, MD   aspirin (Aspirin 81) 81 mg EC tablet Take 1 tablet (81 mg total) by mouth daily 22  Yes RAQUEL Cochran   atorvastatin (LIPITOR) 10 mg tablet Take 1 tablet (10 mg total) by mouth daily 20  Yes Colt Bright DO   benzonatate (TESSALON PERLES) 100 mg capsule Take 1 capsule (100 mg total) by mouth 3 (three) times a day as needed for cough 22  Yes Ken Elizabeth JEREMIE Chand   budesonide-formoterol (SYMBICORT) 160-4 5 mcg/act inhaler Inhale 2 puffs 2 (two) times a day Rinse mouth after use  6/16/20  Yes Colt Bright DO   ferrous sulfate 325 (65 Fe) mg tablet Take 325 mg by mouth daily with breakfast   Yes Historical Provider, MD   furosemide (LASIX) 20 mg tablet Lasix 20mg daily for 2 days in a row then every other day  Patient taking differently: 20 mg daily 5/19/22  Yes RAQUEL Griffiths   metoprolol tartrate (LOPRESSOR) 50 mg tablet Take 1 tablet (50 mg total) by mouth every 12 (twelve) hours 6/16/20  Yes Colt Bright DO   pantoprazole (PROTONIX) 40 mg tablet Take 1 tablet (40 mg total) by mouth daily 6/16/20  Yes Colt Bright DO   potassium chloride (K-DUR,KLOR-CON) 20 mEq tablet Daily for 3 days then EOD when taking Lasix pill 5/19/22  Yes RAQUEL Griffiths   tiotropium (SPIRIVA) 18 mcg inhalation capsule Place 1 capsule (18 mcg total) into inhaler and inhale daily 6/16/20  Yes Colt Bright DO   melatonin 3 mg Take 2 tablets (6 mg total) by mouth daily at bedtime  Patient not taking: No sig reported 6/16/20   Sunita Kemp DO       Allergies:   Allergies   Allergen Reactions    Penicillins Hives, Itching and Rash    Lisinopril Rash     Side pains and rash        Review of Systems:   Review of Systems - History obtained from chart review, the patient and son  General ROS: negative  Psychological ROS: negative  Ophthalmic ROS: positive for - uses glasses  ENT ROS: negative  Allergy and Immunology ROS: negative  Hematological and Lymphatic ROS: negative  Endocrine ROS: negative  Breast ROS: negative  Respiratory ROS: no cough, shortness of breath, or wheezing  Cardiovascular ROS: no chest pain or dyspnea on exertion  Gastrointestinal ROS: no abdominal pain, change in bowel habits, or black or bloody stools  Genito-Urinary ROS: no dysuria, trouble voiding, or hematuria  Musculoskeletal ROS: negative  Neurological ROS: no TIA or stroke symptoms  Dermatological ROS: negative    Vital Signs:   Vitals:    09/22/22 0924   BP: (!) 186/87   BP Location: Left arm   Patient Position: Sitting   Cuff Size: Standard   Pulse: (!) 51   Temp: 98 1 °F (36 7 °C)   Weight: 61 5 kg (135 lb 8 oz)       Physical Exam:  General: Alert, oriented, frail, muscle wasting, no acute distress  HEENT/NECK:  PERRLA  No jugular venous distention  Cardiac:Regular rate and rhythm, no murmurs rubs or gallops  Carotid arteries: 2+ pulses, no bruits  Pulmonary:  Breath sounds clear bilaterally  Abdomen:  Non-tender, Non-distended  Positive bowel sounds  Upper extremities: 2+ radial pulses; brisk capillary refill  Lower extremities: Extremities warm/dry  PT/DP pulses 1+ bilaterally  Trace edema B/L  Neuro: Alert and oriented X 3  Sensation is grossly intact  No focal deficits  Musculoskeletal: MAEE, stable gait  Skin: Warm/Dry, without rashes or lesions  Lab Results:   Lab Results   Component Value Date    SODIUM 142 04/25/2022    K 3 6 04/25/2022    CL 96 (L) 04/25/2022    CO2 42 (H) 04/25/2022    BUN 17 04/25/2022    CREATININE 0 91 04/25/2022    GLUC 96 04/25/2022    CALCIUM 9 1 04/25/2022     Lab Results   Component Value Date    HGBA1C 6 2 (H) 06/13/2020     Lab Results   Component Value Date    CKTOTAL 52 06/11/2020    TROPONINI 0 02 08/05/2021       Imaging Studies:     CT Chest/Abd/Pelvis:  9/16/22    Stable appearance of aortic aneurysm status post endograft repair  Echocardiogram: 3/14/22      Left Ventricle: Left ventricular cavity size is normal  Wall thickness is moderate to severely increased  There is moderate to severe concentric hypertrophy  The left ventricular ejection fraction is 73%  Systolic function is normal  Wall motion is normal  Diastolic function is mildly abnormal, consistent with grade I (abnormal) relaxation  Left atrial filling pressure is normal     Right Ventricle: Right ventricular cavity size is mildly dilated   Systolic function is normal     Right Atrium: The atrium is mildly dilated  *The Aortic Valve is trileaflet  The leaflets are mildly thickened  The                   leaflets are mildly calcified  The leaflets exhibit normal mobility  There        is no evidence of regurgitation  There is no evidence of stenosis    Tricuspid Valve: There is mild to moderate regurgitation    Pulmonary Artery: The estimated pulmonary artery systolic pressure is 54 5 mmHg  The pulmonary artery systolic pressure is moderately increased      I have personally reviewed pertinent films in PACS     PCP and Cardiology notes reviewed       Assessment:  Patient Active Problem List    Diagnosis Date Noted    CHF [facility]congestive heart failure) [facility] ) 04/23/2022    BELLA (acute kidney injury) ([facility] ) 03/16/2022    Lightheadedness 03/12/2022    Fever due to infection 08/08/2021    Clostridium difficile infection 08/07/2021    Proctitis 08/06/2021    Rectal bleeding 08/05/2021    Hyperlipidemia 08/05/2021    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) 09/24/2020    Altered mental status     Early satiety 06/12/2020    Dysphagia 06/12/2020    TIA (transient ischemic attack) 05/28/2020    Left leg swelling 03/22/2019    Acquired renal cyst of left kidney 01/21/2019    Preop exam for internal medicine 01/21/2019    Abdominal pain 01/19/2019    Incarcerated right inguinal hernia 01/19/2019    Acute on chronic diastolic congestive heart failure ([address] ) 01/19/2019    Leukocytosis 12/29/2018    Thrombocytopenia ([address] ) 12/29/2018    Hypochloremia 12/29/2018    Aneurysm, aorta, thoracic ([facility] )     Thoracic aortic aneurysm without rupture ([facility] ) 11/19/2018    Edema of both legs 08/02/2018    Snoring 08/02/2018    Pneumonia 07/18/2018    Varicose veins of both lower extremities with pain 05/23/2018    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/23/2018    Chronic respiratory failure with hypoxia ([address] ) 04/02/2018    Accelerated essential hypertension 02/01/2018    COPD (chronic obstructive pulmonary disease) ([facility] ) 02/01/2018    Descending aortic aneurysm ([facility] ) 02/01/2018    History of DVT (deep vein thrombosis) 02/01/2018    Benign essential hypertension 08/02/2017    Thoracic aortic aneurysm ([facility] ) 08/02/2017       Impressison/Plan:    CT imaging performed prior to this visit demonstrates stable appearance of thoracic aortic endograft  These findings were confirmed and shared with the patient today  In light of patient's advanced age and comobidities, no further follow up or imaging is recommended  Contiunue routine follow up with PCP and Cardiology  Brandie Romero , his wife and son were comfortable with our recommendations, and their questions were answered to their satisfaction  Aortic Aneurysm Instructions were provided to the patient as follows:    1  No lifting more than 50 pounds  Regular aerobic exercise permitted and recommended  2  Maintain a controlled blood pressure with a goal of less than 120/80  3  Follow up in Aortic Clinic as recommended with radiology follow up as instructed  4  Report to the ER or call 911 immediately with the following signs / symptoms: sudden onset of back pain, chest pain or shortness of breath        Routine referral to gastroenterology for colonoscopy screening was not indicated, as the patient is over 78years old    SIGNATURE: RAQUEL Donaldson  DATE: September 22, 2022  TIME: 9:37 AM

## 2022-10-10 RX ORDER — POTASSIUM CHLORIDE 1500 MG/1
TABLET, FILM COATED, EXTENDED RELEASE ORAL
COMMUNITY
Start: 2022-09-14 | End: 2022-10-11 | Stop reason: SDUPTHER

## 2022-10-11 ENCOUNTER — OFFICE VISIT (OUTPATIENT)
Dept: FAMILY MEDICINE CLINIC | Facility: CLINIC | Age: 87
End: 2022-10-11
Payer: MEDICARE

## 2022-10-11 VITALS
BODY MASS INDEX: 25.86 KG/M2 | HEART RATE: 56 BPM | TEMPERATURE: 98 F | SYSTOLIC BLOOD PRESSURE: 150 MMHG | RESPIRATION RATE: 20 BRPM | OXYGEN SATURATION: 90 % | WEIGHT: 137 LBS | DIASTOLIC BLOOD PRESSURE: 90 MMHG | HEIGHT: 61 IN

## 2022-10-11 DIAGNOSIS — E53.8 VITAMIN B12 DEFICIENCY: ICD-10-CM

## 2022-10-11 DIAGNOSIS — R73.03 PRE-DIABETES: Primary | ICD-10-CM

## 2022-10-11 DIAGNOSIS — I71.20 THORACIC AORTIC ANEURYSM WITHOUT RUPTURE: ICD-10-CM

## 2022-10-11 DIAGNOSIS — D69.6 THROMBOCYTOPENIA (HCC): ICD-10-CM

## 2022-10-11 DIAGNOSIS — J44.9 COPD (CHRONIC OBSTRUCTIVE PULMONARY DISEASE) (HCC): ICD-10-CM

## 2022-10-11 DIAGNOSIS — K51.20 ULCERATIVE (CHRONIC) PROCTITIS WITHOUT COMPLICATIONS (HCC): ICD-10-CM

## 2022-10-11 DIAGNOSIS — I50.33 ACUTE ON CHRONIC DIASTOLIC HEART FAILURE (HCC): ICD-10-CM

## 2022-10-11 DIAGNOSIS — R71.8 ELEVATED MCV: ICD-10-CM

## 2022-10-11 DIAGNOSIS — F02.80 DEMENTIA IN OTHER DISEASES CLASSIFIED ELSEWHERE, UNSPECIFIED SEVERITY, WITHOUT BEHAVIORAL DISTURBANCE, PSYCHOTIC DISTURBANCE, MOOD DISTURBANCE, AND ANXIETY (HCC): ICD-10-CM

## 2022-10-11 PROBLEM — J18.9 PNEUMONIA: Status: RESOLVED | Noted: 2018-07-18 | Resolved: 2022-10-11

## 2022-10-11 PROCEDURE — 99204 OFFICE O/P NEW MOD 45 MIN: CPT | Performed by: FAMILY MEDICINE

## 2022-10-11 RX ORDER — ALBUTEROL SULFATE 90 UG/1
2 AEROSOL, METERED RESPIRATORY (INHALATION) 4 TIMES DAILY
Qty: 18 G | Refills: 3 | Status: SHIPPED | OUTPATIENT
Start: 2022-10-11

## 2022-10-11 RX ORDER — POTASSIUM CHLORIDE 20 MEQ/1
TABLET, EXTENDED RELEASE ORAL
Qty: 33 TABLET | Refills: 4 | Status: SHIPPED | OUTPATIENT
Start: 2022-10-11

## 2022-10-11 RX ORDER — METOPROLOL TARTRATE 50 MG/1
50 TABLET, FILM COATED ORAL EVERY 12 HOURS SCHEDULED
Qty: 60 TABLET | Refills: 0 | Status: SHIPPED | OUTPATIENT
Start: 2022-10-11

## 2022-10-11 RX ORDER — PANTOPRAZOLE SODIUM 40 MG/1
TABLET, DELAYED RELEASE ORAL
COMMUNITY
Start: 2022-10-11 | End: 2023-04-05 | Stop reason: SDUPTHER

## 2022-10-11 RX ORDER — FUROSEMIDE 20 MG/1
20 TABLET ORAL DAILY
Qty: 30 TABLET | Refills: 1 | Status: SHIPPED | OUTPATIENT
Start: 2022-10-11

## 2022-10-11 NOTE — PROGRESS NOTES
Name: Liam Abdi      :       MRN: [id number]  Encounter Provider: Yael Concepcion MD  Encounter Date: 10/11/2022   Encounter department: [facility]    Patient looks chronically ill, with a history of COPD and other chronic related issues but having no distress at this time  He has dementia but does not report psychotic disturbance  With a history of prediabetes, rechecking levels of hemoglobin A1c, rechecking CBC in the setting of thrombocytopenia, elevated MCV to rule out thyroid disease, iron U72 and folic acid deficiency, fever disorder  He has chronic diastolic heart failure and will continue on furosemide 20 mg once a day along with potassium to prevent depletion  COPD without exacerbation maintenance with tiouotropium (Spiriva) and albuterol as needed only  No ics recommended at this time  1  Pre-diabetes  -     HEMOGLOBIN A1C W/ EAG ESTIMATION; Future    2  Thrombocytopenia (HCC)  -     CBC and differential; Future    3  Elevated MCV  -     CBC and differential; Future    4  BMI 25 0-25 9,adult    5  Acute on chronic diastolic heart failure (HCC)  -     furosemide (LASIX) 20 mg tablet; Take 1 tablet (20 mg total) by mouth daily  -     potassium chloride (K-DUR,KLOR-CON) 20 mEq tablet; Daily for 3 days then EOD when taking Lasix pill    6  Thoracic aortic aneurysm without rupture  -     Lipid panel; Future  -     Comprehensive metabolic panel; Future  -     metoprolol tartrate (LOPRESSOR) 50 mg tablet; Take 1 tablet (50 mg total) by mouth every 12 (twelve) hours    7  COPD (chronic obstructive pulmonary disease) (HCC)  -     tiotropium (SPIRIVA) 18 mcg inhalation capsule; Place 1 capsule (18 mcg total) into inhaler and inhale daily  -     albuterol (PROVENTIL HFA,VENTOLIN HFA) 90 mcg/act inhaler; Inhale 2 puffs 4 (four) times a day    8  Vitamin B12 deficiency  -     Vitamin B12; Future    9   Dementia in other diseases classified elsewhere, unspecified severity, without behavioral disturbance, psychotic disturbance, mood disturbance, and anxiety ([facility] )    10  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[facility] )           Subjective      Patient to establish medical care  His suffer of COPD, has history of prediabetes, thrombocytopenia unspecified, previous labs reported elevated and CT  Has also history of chronic diastolic heart failure, thoracic aortic aneurysm a and vitamin B12 deficiency  His wife is with him and also reports having dementia with normal behavior  He was reported in the past suffering of proctitis but has no complaints at this time  Review of Systems   Constitutional: Negative for diaphoresis, fatigue, fever and unexpected weight change  Respiratory: Positive for shortness of breath (Short of breath with exertion)  Negative for apnea, cough, choking and chest tightness  Cardiovascular: Negative for chest pain, palpitations and leg swelling  Gastrointestinal: Negative for abdominal distention, abdominal pain, anal bleeding, blood in stool and constipation  Musculoskeletal: Negative for arthralgias, back pain, gait problem and joint swelling  Neurological: Negative for dizziness, facial asymmetry, light-headedness and headaches  Psychiatric/Behavioral: Negative for behavioral problems, dysphoric mood and self-injury  The patient is not nervous/anxious          Current Outpatient Medications on File Prior to Visit   Medication Sig   • [DISCONTINUED] acetaminophen (TYLENOL) 325 mg tablet Take 2 tablets (650 mg total) by mouth every 6 (six) hours as needed for fever (temperature greater than 101 F)   • [DISCONTINUED] albuterol (2 5 mg/3 mL) 0 083 % nebulizer solution Take 1 vial (2 5 mg total) by nebulization every 6 (six) hours as needed for wheezing or shortness of breath   • [DISCONTINUED] albuterol (PROVENTIL HFA,VENTOLIN HFA) 90 mcg/act inhaler Inhale 2 puffs   • [DISCONTINUED] aspirin (Aspirin 81) 81 mg EC tablet Take 1 tablet (81 mg total) by mouth daily   • [DISCONTINUED] atorvastatin (LIPITOR) 10 mg tablet Take 1 tablet (10 mg total) by mouth daily   • [DISCONTINUED] benzonatate (TESSALON PERLES) 100 mg capsule Take 1 capsule (100 mg total) by mouth 3 (three) times a day as needed for cough   • [DISCONTINUED] budesonide-formoterol (SYMBICORT) 160-4 5 mcg/act inhaler Inhale 2 puffs 2 (two) times a day Rinse mouth after use  • [DISCONTINUED] ferrous sulfate 325 (65 Fe) mg tablet Take 325 mg by mouth daily with breakfast   • [DISCONTINUED] furosemide (LASIX) 20 mg tablet Lasix 20mg daily for 2 days in a row then every other day (Patient taking differently: 20 mg daily)   • [DISCONTINUED] Incruse Ellipta 62 5 MCG/INH AEPB inhaler    • [DISCONTINUED] melatonin 3 mg Take 2 tablets (6 mg total) by mouth daily at bedtime (Patient not taking: No sig reported)   • [DISCONTINUED] metoprolol tartrate (LOPRESSOR) 50 mg tablet Take 1 tablet (50 mg total) by mouth every 12 (twelve) hours   • [DISCONTINUED] pantoprazole (PROTONIX) 40 mg tablet Take 1 tablet (40 mg total) by mouth daily   • [DISCONTINUED] potassium chloride (K-DUR,KLOR-CON) 20 mEq tablet Daily for 3 days then EOD when taking Lasix pill   • [DISCONTINUED] Potassium Chloride ER 20 MEQ TBCR    • [DISCONTINUED] tiotropium (SPIRIVA) 18 mcg inhalation capsule Place 1 capsule (18 mcg total) into inhaler and inhale daily       Objective     /90 (BP Location: Left arm, Patient Position: Sitting, Cuff Size: Standard)   Pulse 56   Temp 98 °F (36 7 °C) (Temporal)   Resp 20   Ht 5' 1" (1 549 m)   Wt 62 1 kg (137 lb)   SpO2 90% Comment: oxygen 2 liters  BMI 25 89 kg/m²     Physical Exam  Vitals and nursing note reviewed  Constitutional:       Appearance: He is ill-appearing (Chronically)  Neck:      Thyroid: No thyroid mass or thyromegaly  Vascular: No carotid bruit or JVD  Trachea: No tracheal tenderness  Cardiovascular:      Rate and Rhythm: Normal rate and regular rhythm  No extrasystoles are present  Pulses: Normal pulses  Heart sounds: Normal heart sounds  Heart sounds not distant  No friction rub  Pulmonary:      Effort: Pulmonary effort is normal  No tachypnea or bradypnea  Breath sounds: No stridor  Comments: Decreased breath sounds bilaterally  Abdominal:      General: Bowel sounds are normal  There is no abdominal bruit  Palpations: Abdomen is soft  There is no hepatomegaly or splenomegaly  Hernia: No hernia is present  Musculoskeletal:         General: Normal range of motion  Cervical back: No edema or rigidity  Skin:     General: Skin is warm and dry  Coloration: Skin is pale  Neurological:      Mental Status: He is oriented to person, place, and time  Deep Tendon Reflexes: Reflexes are normal and symmetric  Psychiatric:         Behavior: Behavior normal          Thought Content: Thought content normal          Judgment: Judgment normal        Alex Moon MD  BMI Counseling: Body mass index is 25 89 kg/m²  The BMI is above normal  Nutrition recommendations include decreasing overall calorie intake  Falls Plan of Care: Balance, strength, and gait training instructions were provided

## 2022-10-11 NOTE — PATIENT INSTRUCTIONS
Fall Prevention   AMBULATORY CARE:   Fall prevention  includes ways to make your home and other areas safer  It also includes ways you can move more carefully to prevent a fall  Health conditions that cause changes in your blood pressure, vision, or muscle strength and coordination may increase your risk for falls  Medicines may also increase your risk for falls if they make you dizzy, weak, or sleepy  Call 911 or have someone else call if:   · You have fallen and are unconscious  · You have fallen and cannot move part of your body  Contact your healthcare provider if:   · You have fallen and have pain or a headache  · You have questions or concerns about your condition or care  Fall prevention tips:   · Stand or sit up slowly  This may help you keep your balance and prevent falls  · Use assistive devices as directed  Your healthcare provider may suggest that you use a cane or walker to help you keep your balance  You may need to have grab bars put in your bathroom near the toilet or in the shower  · Wear shoes that fit well and have soles that   Wear shoes both inside and outside  Use slippers with good   Do not wear shoes with high heels  · Wear a personal alarm  This is a device that allows you to call 911 if you fall and need help  Ask your healthcare provider for more information  · Stay active  Exercise can help strengthen your muscles and improve your balance  Your healthcare provider may recommend water aerobics or walking  He or she may also recommend physical therapy to improve your coordination  Never start an exercise program without talking to your healthcare provider first          · Manage your medical conditions  Keep all appointments with your healthcare providers  Visit your eye doctor as directed  Home safety tips:       · Add items to prevent falls in the bathroom  Put nonslip strips on your bath or shower floor to prevent you from slipping   Use a bath mat if you do not have carpet in the bathroom  This will prevent you from falling when you step out of the bath or shower  Use a shower seat so you do not need to stand while you shower  Sit on the toilet or a chair in your bathroom to dry yourself and put on clothing  This will prevent you from losing your balance from drying or dressing yourself while you are standing  · Keep paths clear  Remove books, shoes, and other objects from walkways and stairs  Place cords for telephones and lamps out of the way so that you do not need to walk over them  Tape them down if you cannot move them  Remove small rugs  If you cannot remove a rug, secure it with double-sided tape  This will prevent you from tripping  · Install bright lights in your home  Use night lights to help light paths to the bathroom or kitchen  Always turn on the light before you start walking  · Keep items you use often on shelves within reach  Do not use a step stool to help you reach an item  · Paint or place reflective tape on the edges of your stairs  This will help you see the stairs better  Follow up with your doctor as directed:  Write down your questions so you remember to ask them during your visits  © Copyright [employer] 2022 Information is for End User's use only and may not be sold, redistributed or otherwise used for commercial purposes  All illustrations and images included in CareNotes® are the copyrighted property of A [employer] M [employer] Inc  or Barrie Manley   The above information is an  only  It is not intended as medical advice for individual conditions or treatments  Talk to your doctor, nurse or pharmacist before following any medical regimen to see if it is safe and effective for you

## 2022-10-12 PROBLEM — B99.9 FEVER DUE TO INFECTION: Status: RESOLVED | Noted: 2021-08-08 | Resolved: 2022-10-12

## 2022-10-13 ENCOUNTER — APPOINTMENT (OUTPATIENT)
Dept: RADIOLOGY | Facility: HOSPITAL | Age: 87
End: 2022-10-13
Payer: MEDICARE

## 2022-10-13 ENCOUNTER — HOSPITAL ENCOUNTER (EMERGENCY)
Facility: HOSPITAL | Age: 87
Discharge: HOME | End: 2022-10-13
Attending: EMERGENCY MEDICINE
Payer: MEDICARE

## 2022-10-13 ENCOUNTER — APPOINTMENT (EMERGENCY)
Dept: CT IMAGING | Facility: HOSPITAL | Age: 87
End: 2022-10-13
Payer: MEDICARE

## 2022-10-13 VITALS
DIASTOLIC BLOOD PRESSURE: 86 MMHG | SYSTOLIC BLOOD PRESSURE: 181 MMHG | WEIGHT: 137 LBS | RESPIRATION RATE: 30 BRPM | BODY MASS INDEX: 25.89 KG/M2 | HEART RATE: 56 BPM | TEMPERATURE: 98.2 F | OXYGEN SATURATION: 95 %

## 2022-10-13 DIAGNOSIS — N39.0 UTI (URINARY TRACT INFECTION): Primary | ICD-10-CM

## 2022-10-13 DIAGNOSIS — R41.82 ALTERED MENTAL STATUS: ICD-10-CM

## 2022-10-13 DIAGNOSIS — R03.0 ELEVATED BLOOD PRESSURE READING: ICD-10-CM

## 2022-10-13 LAB
2HR DELTA HS TROPONIN: 2 NG/L
ALBUMIN SERPL BCP-MCNC: 3.1 G/DL (ref 3.5–5)
ALP SERPL-CCNC: 80 U/L (ref 46–116)
ALT SERPL W P-5'-P-CCNC: 21 U/L (ref 12–78)
ANION GAP SERPL CALCULATED.3IONS-SCNC: -2 MMOL/L (ref 4–13)
AST SERPL W P-5'-P-CCNC: 18 U/L (ref 5–45)
ATRIAL RATE: 53 BPM
ATRIAL RATE: 75 BPM
BACTERIA UR QL AUTO: ABNORMAL /HPF
BASOPHILS # BLD AUTO: 0.03 THOUSANDS/ΜL (ref 0–0.1)
BASOPHILS NFR BLD AUTO: 1 % (ref 0–1)
BILIRUB SERPL-MCNC: 0.35 MG/DL (ref 0.2–1)
BILIRUB UR QL STRIP: NEGATIVE
BUN SERPL-MCNC: 16 MG/DL (ref 5–25)
CALCIUM ALBUM COR SERPL-MCNC: 9.8 MG/DL (ref 8.3–10.1)
CALCIUM SERPL-MCNC: 9.1 MG/DL (ref 8.3–10.1)
CARDIAC TROPONIN I PNL SERPL HS: 11 NG/L
CARDIAC TROPONIN I PNL SERPL HS: 9 NG/L
CHLORIDE SERPL-SCNC: 103 MMOL/L (ref 96–108)
CLARITY UR: CLEAR
CO2 SERPL-SCNC: 44 MMOL/L (ref 21–32)
COLOR UR: YELLOW
CREAT SERPL-MCNC: 1 MG/DL (ref 0.6–1.3)
EOSINOPHIL # BLD AUTO: 0.69 THOUSAND/ΜL (ref 0–0.61)
EOSINOPHIL NFR BLD AUTO: 12 % (ref 0–6)
ERYTHROCYTE [DISTWIDTH] IN BLOOD BY AUTOMATED COUNT: 13.7 % (ref 11.6–15.1)
GFR SERPL CREATININE-BSD FRML MDRD: 68 ML/MIN/1.73SQ M
GLUCOSE SERPL-MCNC: 119 MG/DL (ref 65–140)
GLUCOSE UR STRIP-MCNC: NEGATIVE MG/DL
HCT VFR BLD AUTO: 46.6 % (ref 36.5–49.3)
HGB BLD-MCNC: 12.9 G/DL (ref 12–17)
HGB UR QL STRIP.AUTO: ABNORMAL
IMM GRANULOCYTES # BLD AUTO: 0.01 THOUSAND/UL (ref 0–0.2)
IMM GRANULOCYTES NFR BLD AUTO: 0 % (ref 0–2)
KETONES UR STRIP-MCNC: NEGATIVE MG/DL
LEUKOCYTE ESTERASE UR QL STRIP: ABNORMAL
LYMPHOCYTES # BLD AUTO: 1.84 THOUSANDS/ΜL (ref 0.6–4.47)
LYMPHOCYTES NFR BLD AUTO: 33 % (ref 14–44)
MCH RBC QN AUTO: 29.6 PG (ref 26.8–34.3)
MCHC RBC AUTO-ENTMCNC: 27.7 G/DL (ref 31.4–37.4)
MCV RBC AUTO: 107 FL (ref 82–98)
MONOCYTES # BLD AUTO: 0.56 THOUSAND/ΜL (ref 0.17–1.22)
MONOCYTES NFR BLD AUTO: 10 % (ref 4–12)
MUCOUS THREADS UR QL AUTO: ABNORMAL
NEUTROPHILS # BLD AUTO: 2.51 THOUSANDS/ΜL (ref 1.85–7.62)
NEUTS SEG NFR BLD AUTO: 44 % (ref 43–75)
NITRITE UR QL STRIP: NEGATIVE
NON-SQ EPI CELLS URNS QL MICRO: ABNORMAL /HPF
NRBC BLD AUTO-RTO: 0 /100 WBCS
P AXIS: 55 DEGREES
P AXIS: 71 DEGREES
PH UR STRIP.AUTO: 7 [PH] (ref 4.5–8)
PLATELET # BLD AUTO: 123 THOUSANDS/UL (ref 149–390)
PMV BLD AUTO: 9.2 FL (ref 8.9–12.7)
POTASSIUM SERPL-SCNC: 3.8 MMOL/L (ref 3.5–5.3)
PR INTERVAL: 152 MS
PR INTERVAL: 170 MS
PROT SERPL-MCNC: 7.8 G/DL (ref 6.4–8.4)
PROT UR STRIP-MCNC: ABNORMAL MG/DL
QRS AXIS: 72 DEGREES
QRS AXIS: 79 DEGREES
QRSD INTERVAL: 74 MS
QRSD INTERVAL: 84 MS
QT INTERVAL: 404 MS
QT INTERVAL: 450 MS
QTC INTERVAL: 420 MS
QTC INTERVAL: 422 MS
RBC # BLD AUTO: 4.36 MILLION/UL (ref 3.88–5.62)
RBC #/AREA URNS AUTO: ABNORMAL /HPF
SODIUM SERPL-SCNC: 145 MMOL/L (ref 135–147)
SP GR UR STRIP.AUTO: 1.02 (ref 1–1.03)
T WAVE AXIS: 69 DEGREES
T WAVE AXIS: 74 DEGREES
UROBILINOGEN UR QL STRIP.AUTO: 1 E.U./DL
VENTRICULAR RATE: 53 BPM
VENTRICULAR RATE: 65 BPM
WBC # BLD AUTO: 5.64 THOUSAND/UL (ref 4.31–10.16)
WBC #/AREA URNS AUTO: ABNORMAL /HPF

## 2022-10-13 PROCEDURE — 99285 EMERGENCY DEPT VISIT HI MDM: CPT | Performed by: EMERGENCY MEDICINE

## 2022-10-13 PROCEDURE — 71046 X-RAY EXAM CHEST 2 VIEWS: CPT

## 2022-10-13 PROCEDURE — G1004 CDSM NDSC: HCPCS

## 2022-10-13 PROCEDURE — 36415 COLL VENOUS BLD VENIPUNCTURE: CPT | Performed by: EMERGENCY MEDICINE

## 2022-10-13 PROCEDURE — 93010 ELECTROCARDIOGRAM REPORT: CPT | Performed by: STUDENT IN AN ORGANIZED HEALTH CARE EDUCATION/TRAINING PROGRAM

## 2022-10-13 PROCEDURE — 81001 URINALYSIS AUTO W/SCOPE: CPT

## 2022-10-13 PROCEDURE — 84484 ASSAY OF TROPONIN QUANT: CPT | Performed by: EMERGENCY MEDICINE

## 2022-10-13 PROCEDURE — 85025 COMPLETE CBC W/AUTO DIFF WBC: CPT | Performed by: EMERGENCY MEDICINE

## 2022-10-13 PROCEDURE — 99284 EMERGENCY DEPT VISIT MOD MDM: CPT

## 2022-10-13 PROCEDURE — 80053 COMPREHEN METABOLIC PANEL: CPT | Performed by: EMERGENCY MEDICINE

## 2022-10-13 PROCEDURE — 70450 CT HEAD/BRAIN W/O DYE: CPT

## 2022-10-13 PROCEDURE — 93005 ELECTROCARDIOGRAM TRACING: CPT

## 2022-10-13 RX ORDER — CIPROFLOXACIN 500 MG/1
500 TABLET, FILM COATED ORAL EVERY 12 HOURS SCHEDULED
Qty: 14 TABLET | Refills: 0 | Status: SHIPPED | OUTPATIENT
Start: 2022-10-13 | End: 2022-10-20

## 2022-10-13 RX ORDER — CIPROFLOXACIN 500 MG/1
500 TABLET, FILM COATED ORAL ONCE
Status: COMPLETED | OUTPATIENT
Start: 2022-10-13 | End: 2022-10-13

## 2022-10-13 RX ADMIN — CIPROFLOXACIN 500 MG: 500 TABLET, FILM COATED ORAL at 22:20

## 2022-10-14 NOTE — ED ATTENDING ATTESTATION
10/13/2022  IKristel DO, saw and evaluated the patient  I have discussed the patient with the resident/non-physician practitioner and agree with the resident's/non-physician practitioner's findings, Plan of Care, and MDM as documented in the resident's/non-physician practitioner's note, except where noted  All available labs and Radiology studies were reviewed  I was present for key portions of any procedure(s) performed by the resident/non-physician practitioner and I was immediately available to provide assistance  At this point I agree with the current assessment done in the Emergency Department  I have conducted an independent evaluation of this patient a history and physical is as follows:87y M w/ O2 dependent COPD - pt seemed confused while wife was cooking so wife called ambulance  Pt is supposed to be on O2, but upon EMS arrival pt not wearing oxygen w/ sat in the low 80s  Placed back on oxygen and pt now back to baseline  Recent seen by [facility] for URI symptoms  No complaints  Exam WNWD nad, mmm, neck supple, resp non-labored, cv regular rate, abd nd, ext no cce, skin dry, neuro non-focal, normal mood    A/P Confusion      ED Course     Labs Reviewed   CBC AND DIFFERENTIAL - Abnormal       Result Value Ref Range Status    WBC 5 64  4 31 - 10 16 Thousand/uL Final    RBC 4 36  3 88 - 5 62 Million/uL Final    Hemoglobin 12 9  12 0 - 17 0 g/dL Final    Hematocrit 46 6  36 5 - 49 3 % Final     (*) 82 - 98 fL Final    MCH 29 6  26 8 - 34 3 pg Final    MCHC 27 7 (*) 31 4 - 37 4 g/dL Final    RDW 13 7  11 6 - 15 1 % Final    MPV 9 2  8 9 - 12 7 fL Final    Platelets 865 (*) 586 - 390 Thousands/uL Final    nRBC 0  /100 WBCs Final    Neutrophils Relative 44  43 - 75 % Final    Immat GRANS % 0  0 - 2 % Final    Lymphocytes Relative 33  14 - 44 % Final    Monocytes Relative 10  4 - 12 % Final    Eosinophils Relative 12 (*) 0 - 6 % Final    Basophils Relative 1  0 - 1 % Final    Neutrophils Absolute 2 51  1 85 - 7 62 Thousands/µL Final    Immature Grans Absolute 0 01  0 00 - 0 20 Thousand/uL Final    Lymphocytes Absolute 1 84  0 60 - 4 47 Thousands/µL Final    Monocytes Absolute 0 56  0 17 - 1 22 Thousand/µL Final    Eosinophils Absolute 0 69 (*) 0 00 - 0 61 Thousand/µL Final    Basophils Absolute 0 03  0 00 - 0 10 Thousands/µL Final   COMPREHENSIVE METABOLIC PANEL - Abnormal    Sodium 145  135 - 147 mmol/L Final    Potassium 3 8  3 5 - 5 3 mmol/L Final    Chloride 103  96 - 108 mmol/L Final    CO2 44 (*) 21 - 32 mmol/L Final    ANION GAP -2 (*) 4 - 13 mmol/L Final    BUN 16  5 - 25 mg/dL Final    Creatinine 1 00  0 60 - 1 30 mg/dL Final    Comment: Standardized to IDMS reference method    Glucose 119  65 - 140 mg/dL Final    Comment: If the patient is fasting, the ADA then defines impaired fasting glucose as > 100 mg/dL and diabetes as > or equal to 123 mg/dL  Specimen collection should occur prior to Sulfasalazine administration due to the potential for falsely depressed results  Specimen collection should occur prior to Sulfapyridine administration due to the potential for falsely elevated results  Calcium 9 1  8 3 - 10 1 mg/dL Final    Corrected Calcium 9 8  8 3 - 10 1 mg/dL Final    AST 18  5 - 45 U/L Final    Comment: Specimen collection should occur prior to Sulfasalazine administration due to the potential for falsely depressed results  ALT 21  12 - 78 U/L Final    Comment: Specimen collection should occur prior to Sulfasalazine administration due to the potential for falsely depressed results  Alkaline Phosphatase 80  46 - 116 U/L Final    Total Protein 7 8  6 4 - 8 4 g/dL Final    Albumin 3 1 (*) 3 5 - 5 0 g/dL Final    Total Bilirubin 0 35  0 20 - 1 00 mg/dL Final    Comment: Use of this assay is not recommended for patients undergoing treatment with eltrombopag due to the potential for falsely elevated results      eGFR 68  ml/min/1 73sq m Final    Narrative:     [employer] Kid[employer] guidelines for Chronic Kidney Disease (CKD):   •  Stage 1 with normal or high GFR (GFR > 90 mL/min/1 73 square meters)  •  Stage 2 Mild CKD (GFR = 60-89 mL/min/1 73 square meters)  •  Stage 3A Moderate CKD (GFR = 45-59 mL/min/1 73 square meters)  •  Stage 3B Moderate CKD (GFR = 30-44 mL/min/1 73 square meters)  •  Stage 4 Severe CKD (GFR = 15-29 mL/min/1 73 square meters)  •  Stage 5 End Stage CKD (GFR <15 mL/min/1 73 square meters)  Note: GFR calculation is accurate only with a steady state creatinine   URINE MICROSCOPIC - Abnormal    RBC, UA 2-4  None Seen, 2-4 /hpf Final    WBC, UA 4-10 (*) None Seen, 2-4, 5-60 /hpf Final    Epithelial Cells Occasional  None Seen, Occasional /hpf Final    Bacteria, UA Innumerable (*) None Seen, Occasional /hpf Final    MUCUS THREADS Occasional (*) None Seen Final   URINE MACROSCOPIC, POC - Abnormal    Color, UA Yellow   Final    Clarity, UA Clear   Final    pH, UA 7 0  4 5 - 8 0 Final    Leukocytes, UA Trace (*) Negative Final    Nitrite, UA Negative  Negative Final    Protein, UA Trace (*) Negative mg/dl Final    Glucose, UA Negative  Negative mg/dl Final    Ketones, UA Negative  Negative mg/dl Final    Urobilinogen, UA 1 0  0 2, 1 0 E U /dl E U /dl Final    Bilirubin, UA Negative  Negative Final    Occult Blood, UA Trace (*) Negative Final    Specific Gravity, UA 1 020  1 003 - 1 030 Final    Narrative:     CLINITEK RESULT   HS TROPONIN I 0HR - Normal    hs TnI 0hr 9  "Refer to ACS Flowchart"- see link ng/L Final    Comment:                                              Initial (time 0) result  If >=50 ng/L, Myocardial injury suggested ;  Type of myocardial injury and treatment strategy  to be determined  If 5-49 ng/L, a delta result at 2 hours and or 4 hours will be needed to further evaluate  If <4 ng/L, and chest pain has been >3 hours since onset, patient may qualify for discharge based on the HEART score in the ED    If <5 ng/L and <3hours since onset of chest pain, a delta result at 2 hours will be needed to further evaluate  HS Troponin 99th Percentile URL of a Health Population=12 ng/L with a 95% Confidence Interval of 8-18 ng/L  Second Troponin (time 2 hours)  If calculated delta >= 20 ng/L,  Myocardial injury suggested ; Type of myocardial injury and treatment strategy to be determined  If 5-49 ng/L and the calculated delta is 5-19 ng/L, consult medical service for evaluation  Continue evaluation for ischemia on ecg and other possible etiology and repeat hs troponin at 4 hours  If delta is <5 ng/L at 2 hours, consider discharge based on risk stratification via the HEART score (if in ED), or SANTOS risk score in IP/Observation  HS Troponin 99th Percentile URL of a Health Population=12 ng/L with a 95% Confidence Interval of 8-18 ng/L    HS TROPONIN I 2HR - Normal    hs TnI 2hr 11  "Refer to ACS Flowchart"- see link ng/L Final    Comment:                                              Initial (time 0) result  If >=50 ng/L, Myocardial injury suggested ;  Type of myocardial injury and treatment strategy  to be determined  If 5-49 ng/L, a delta result at 2 hours and or 4 hours will be needed to further evaluate  If <4 ng/L, and chest pain has been >3 hours since onset, patient may qualify for discharge based on the HEART score in the ED  If <5 ng/L and <3hours since onset of chest pain, a delta result at 2 hours will be needed to further evaluate  HS Troponin 99th Percentile URL of a Health Population=12 ng/L with a 95% Confidence Interval of 8-18 ng/L  Second Troponin (time 2 hours)  If calculated delta >= 20 ng/L,  Myocardial injury suggested ; Type of myocardial injury and treatment strategy to be determined  If 5-49 ng/L and the calculated delta is 5-19 ng/L, consult medical service for evaluation  Continue evaluation for ischemia on ecg and other possible etiology and repeat hs troponin at 4 hours    If delta is <5 ng/L at 2 hours, consider discharge based on risk stratification via the HEART score (if in ED), or SANTOS risk score in IP/Observation  HS Troponin 99th Percentile URL of a Health Population=12 ng/L with a 95% Confidence Interval of 8-18 ng/L  Delta 2hr hsTnI 2  <20 ng/L Final     CT head without contrast   Final Result   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  No acute intracranial abnormality  Microangiopathic changes  Workstation performed: [id number]         XR chest 2 views    (Results Pending)         Critical Care Time  Procedures    1  UTI (urinary tract infection)  ciprofloxacin (CIPRO) 500 mg tablet   2  Altered mental status     3  Elevated blood pressure reading       Time reflects when diagnosis was documented in both MDM as applicable and the Disposition within this note     Time User Action Codes Description Comment    10/13/2022 11:29 PM Viona Deck Add [N39 0] UTI (urinary tract infection)     10/13/2022 11:29 PM Viona Deck Add [R41 82] Altered mental status     10/13/2022 11:46 PM Viona Deck Add [R03 0] Elevated blood pressure reading       ED Disposition     ED Disposition   Discharge    Condition   Stable    Date/Time   Thu Oct 13, 2022 11:45 PM    Comment   Dianakel Quispe discharge to home/self care                 Follow-up Information     Follow up With Specialties Details Why Contact Info Additional Information    Kendell Velasquez MD [facility] Medicine  For Emergency Department Follow-up [address]  [address]  [address] [place] [address] [facility]  If symptoms worsen [facility] [id number]  [address] [place] [address] Emergency Department, [address] [place] [address], [id number]

## 2022-10-14 NOTE — DISCHARGE INSTRUCTIONS
Your evaluation today showed that you had a urinary tract infection  Antibiotics were sent to your pharmacy, take this as prescribed  Keep your oxygen on at home  Follow up with your primary care physician  Return to the ED if your symptoms worsen or if you develop any of the concerning symptoms we discussed

## 2022-10-14 NOTE — ED PROVIDER NOTES
History  Chief Complaint   Patient presents with   • Medical Problem     Per family, pt is supposed to chronically wear oxygen  When EMS got there, patient's oxygen was no where near patient and family stated, "Well he likes to take it off sometimes " Per EMS, sats in the [de-identified] originally  Put patient on oxygen, oxygen saturation now 97%  Pt denies complaints      HPI    82-year-old male, past medical history significant for COPD on 2 L of O2 at baseline, CHF, presenting for evaluation of episode of altered mental status in the setting of hypoxia  Patient's wife provides majority of the history  States that she was in the kitchen when she heard calling out “where am I, where am I?"  She promptly called EMS  When EMS arrived, patient was found to be hypoxic in the low 80s, but was not wearing his oxygen at that time  He was placed on supplemental O2 via nasal cannula with recovery of his O2 sat  At time of evaluation, patient denies any complaints including but not limited to chest pain, shortness of breath, fever, chills, abdominal pain, nausea, vomiting, dysuria, urinary frequency, urinary urgency, focal neurologic deficits  Patient has returned to his baseline mental status per wife  Prior to Admission Medications   Prescriptions Last Dose Informant Patient Reported? Taking?    albuterol (PROVENTIL HFA,VENTOLIN HFA) 90 mcg/act inhaler   No No   Sig: Inhale 2 puffs 4 (four) times a day   furosemide (LASIX) 20 mg tablet   No No   Sig: Take 1 tablet (20 mg total) by mouth daily   metoprolol tartrate (LOPRESSOR) 50 mg tablet   No No   Sig: Take 1 tablet (50 mg total) by mouth every 12 (twelve) hours   pantoprazole (PROTONIX) 40 mg tablet   Yes No   potassium chloride (K-DUR,KLOR-CON) 20 mEq tablet   No No   Sig: Daily for 3 days then EOD when taking Lasix pill   tiotropium (SPIRIVA) 18 mcg inhalation capsule   No No   Sig: Place 1 capsule (18 mcg total) into inhaler and inhale daily      Facility-Administered Medications: None       Past Medical History:   Diagnosis Date   • Acute metabolic encephalopathy 09/94/1432   • Anemia    • Appendicolith    • Ascending aortic aneurysm     3 7   • Asthma    • BPH (benign prostatic hyperplasia)    • CAD (coronary artery disease)     noted on CT scan   • CHF (congestive heart failure) ([facility])    • COPD (chronic obstructive pulmonary disease) ([facility])    • Descending thoracic aortic aneurysm    • Diabetes mellitus ([address] )    • Diverticulosis    • Former tobacco use    • GERD (gastroesophageal reflux disease)    • History of DVT (deep vein thrombosis)     Left leg   • History of transfusion    • Hypertension    • Inguinal hernia     right   • Nephrolithiasis    • Oxygen dependent     2LNC   • Oxygen dependent    • Pneumonia    • Pre-diabetes    • Prostate calculus    • PVD (peripheral vascular disease) ([facility])    • Ulcer    • Varicose vein of leg     b/l       Past Surgical History:   Procedure Laterality Date   • CARDIAC SURGERY     • ESOPHAGOGASTRODUODENOSCOPY     • HERNIA REPAIR Right 1/21/2019    Procedure: REPAIR HERNIA INGUINAL WITH MESH;  Surgeon: Jana Choudhury MD;  Location: [facility] MAIN OR;  Service: General   • INGUINAL HERNIA REPAIR Bilateral    • IR TEVAR  12/27/2018   • IN ENDOVASC TAA REINCL SUBCL N/A 12/27/2018    Procedure: TEVAR - endovascular thoracic aortic aneurysm repair;  Surgeon: Ute Ortega MD;  Location: [facility] MAIN [facility];  Service: Vascular   • THORACIC AORTIC ANEURYSM REPAIR  12/27/2018   • VARICOSE VEIN SURGERY Bilateral     vein stripping       Family History   Problem Relation Age of Onset   • Tuberculosis Mother    • No Known Problems Father    • Cancer Sister    • Diabetes Family    • Hypertension Family      I have reviewed and agree with the history as documented      E-Cigarette/Vaping   • E-Cigarette Use Never User      E-Cigarette/Vaping Substances     Social History     Tobacco Use   • Smoking status: Former Smoker     Packs/day: 1 00     Years: 35 00     Pack years: 35 00     Start date:      Quit date:      Years since quittin 7   • Smokeless tobacco: Never Used   Vaping Use   • Vaping Use: Never used   Substance Use Topics   • Alcohol use: Never   • Drug use: No        Review of Systems   Constitutional: Negative for chills and fever  HENT: Negative for sore throat  Respiratory: Negative for cough and shortness of breath  Cardiovascular: Negative for chest pain, palpitations and leg swelling  Gastrointestinal: Negative for abdominal pain, diarrhea, nausea and vomiting  Genitourinary: Negative for dysuria and frequency  Musculoskeletal: Negative for myalgias  Skin: Negative for rash and wound  Neurological: Negative for dizziness, weakness, light-headedness, numbness and headaches  Psychiatric/Behavioral: Positive for confusion  All other systems reviewed and are negative  Physical Exam  ED Triage Vitals [10/13/22 2011]   Temperature Pulse Respirations Blood Pressure SpO2   98 2 °F (36 8 °C) 62 20 (!) 200/95 98 %      Temp Source Heart Rate Source Patient Position - Orthostatic VS BP Location FiO2 (%)   Oral Monitor Lying Right arm --      Pain Score       No Pain             Orthostatic Vital Signs  Vitals:    10/13/22 2011 10/13/22 2115 10/13/22 2230 10/13/22 2330   BP: (!) 200/95 (!) 197/88 (!) 202/86 (!) 181/86   Pulse: 62 (!) 54 (!) 52 56   Patient Position - Orthostatic VS: Lying Lying Lying Lying       Physical Exam  Vitals and nursing note reviewed  Constitutional:       General: He is not in acute distress  Appearance: Normal appearance  He is ill-appearing (chronically ill appearing)  He is not toxic-appearing  HENT:      Head: Normocephalic and atraumatic  Right Ear: External ear normal       Left Ear: External ear normal       Nose: Nose normal       Mouth/Throat:      Mouth: Mucous membranes are moist       Pharynx: Oropharynx is clear  Eyes:      General: No scleral icterus       Extraocular Movements: Extraocular movements intact  Cardiovascular:      Rate and Rhythm: Normal rate and regular rhythm  Pulses: Normal pulses  Pulmonary:      Effort: Pulmonary effort is normal  No respiratory distress  Breath sounds: Normal breath sounds  Abdominal:      Palpations: Abdomen is soft  Tenderness: There is no abdominal tenderness  Musculoskeletal:         General: Normal range of motion  Cervical back: Normal range of motion and neck supple  Skin:     General: Skin is warm  Capillary Refill: Capillary refill takes less than 2 seconds  Neurological:      General: No focal deficit present  Mental Status: He is alert and oriented to person, place, and time           ED Medications  Medications   ciprofloxacin (CIPRO) tablet 500 mg (500 mg Oral Given 10/13/22 2220)       Diagnostic Studies  Results Reviewed     Procedure Component Value Units Date/Time    HS Troponin I 2hr [[id number]]  (Normal) Collected: 10/13/22 2249    Lab Status: Final result Specimen: Blood from Line, Venous Updated: 10/13/22 2319     hs TnI 2hr 11 ng/L      Delta 2hr hsTnI 2 ng/L     HS Troponin I 4hr [[id number]]     Lab Status: No result Specimen: Blood     Urine Microscopic [[id number]]  (Abnormal) Collected: 10/13/22 2125    Lab Status: Final result Specimen: Urine, Other Updated: 10/13/22 2206     RBC, UA 2-4 /hpf      WBC, UA 4-10 /hpf      Epithelial Cells Occasional /hpf      Bacteria, UA Innumerable /hpf      MUCUS THREADS Occasional    Comprehensive metabolic panel [[id number]]  (Abnormal) Collected: 10/13/22 2040    Lab Status: Final result Specimen: Blood from Arm, Left Updated: 10/13/22 2144     Sodium 145 mmol/L      Potassium 3 8 mmol/L      Chloride 103 mmol/L      CO2 44 mmol/L      ANION GAP -2 mmol/L      BUN 16 mg/dL      Creatinine 1 00 mg/dL      Glucose 119 mg/dL      Calcium 9 1 mg/dL      Corrected Calcium 9 8 mg/dL      AST 18 U/L      ALT 21 U/L      Alkaline Phosphatase 80 U/L      Total Protein 7 8 g/dL      Albumin 3 1 g/dL      Total Bilirubin 0 35 mg/dL      eGFR 68 ml/min/1 73sq m     Narrative:      [place] guidelines for Chronic Kidney Disease (CKD):   •  Stage 1 with normal or high GFR (GFR > 90 mL/min/1 73 square meters)  •  Stage 2 Mild CKD (GFR = 60-89 mL/min/1 73 square meters)  •  Stage 3A Moderate CKD (GFR = 45-59 mL/min/1 73 square meters)  •  Stage 3B Moderate CKD (GFR = 30-44 mL/min/1 73 square meters)  •  Stage 4 Severe CKD (GFR = 15-29 mL/min/1 73 square meters)  •  Stage 5 End Stage CKD (GFR <15 mL/min/1 73 square meters)  Note: GFR calculation is accurate only with a steady state creatinine    Urine Macroscopic, POC [[id number]]  (Abnormal) Collected: 10/13/22 2125    Lab Status: Final result Specimen: Urine Updated: 10/13/22 2127     Color, UA Yellow     Clarity, UA Clear     pH, UA 7 0     Leukocytes, UA Trace     Nitrite, UA Negative     Protein, UA Trace mg/dl      Glucose, UA Negative mg/dl      Ketones, UA Negative mg/dl      Urobilinogen, UA 1 0 E U /dl      Bilirubin, UA Negative     Occult Blood, UA Trace     Specific [place], UA 1 020    Narrative:      CLINITEK RESULT    HS Troponin 0hr (reflex protocol) [[id number]]  (Normal) Collected: 10/13/22 2040    Lab Status: Final result Specimen: Blood from Arm, Left Updated: 10/13/22 2121     hs TnI 0hr 9 ng/L     CBC and differential [[id number]]  (Abnormal) Collected: 10/13/22 2040    Lab Status: Final result Specimen: Blood from Arm, Left Updated: 10/13/22 2058     WBC 5 64 Thousand/uL      RBC 4 36 Million/uL      Hemoglobin 12 9 g/dL      Hematocrit 46 6 %       fL      MCH 29 6 pg      MCHC 27 7 g/dL      RDW 13 7 %      MPV 9 2 fL      Platelets 119 Thousands/uL      nRBC 0 /100 WBCs      Neutrophils Relative 44 %      Immat GRANS % 0 %      Lymphocytes Relative 33 %      Monocytes Relative 10 %      Eosinophils Relative 12 %      Basophils Relative 1 %      Neutrophils Absolute 2 51 Thousands/µL      Immature Grans Absolute 0 01 Thousand/uL      Lymphocytes Absolute 1 84 Thousands/µL      Monocytes Absolute 0 56 Thousand/µL      Eosinophils Absolute 0 69 Thousand/µL      Basophils Absolute 0 03 Thousands/µL                  CT head without contrast   Final Result by Kristy Ventura DO (10/13 2112)   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  No acute intracranial abnormality  Microangiopathic changes  Workstation performed: [id number]         XR chest 2 views    (Results Pending)         Procedures  Procedures      ED Course  ED Course as of 10/14/22 0004   Thu Oct 13, 2022   2046 ECG 12 lead  Procedure Note: EKG  Date/Time: 10/13/22 8:46 PM   Interpreted by: Lionel Gordon DO  Indications / Diagnosis: AMS/ hypoxia  ECG reviewed by me, the ED Physician: yes   The EKG demonstrates:  Rhythm: normal sinus  Intervals: normal intervals  Axis: normal axis  QRS/Blocks: normal QRS  ST Changes: No acute ST Changes, no STD/SHYANNE  Unchanged from prior from Apr 2022 2125 hs TnI 0hr: 9  Will require delta  2126 CT head without contrast  No acute changes  No bleed  2201 CO2(!): 44  Baseline   2208 Bacteria, UA(!): Innumerable  Allergy to penicillins, will treat with cipro  2252 ECG 12 lead  Procedure Note: EKG  Date/Time: 10/13/22 10:53 PM   Interpreted by: Lionel Gordon DO  Indications / Diagnosis: AMS  ECG reviewed by me, the ED Physician: yes   The EKG demonstrates:  Rhythm: normal sinus  Intervals: normal intervals  Axis: normal axis  QRS/Blocks: normal QRS  ST Changes: No acute ST Changes, no STD/SHYANNE    Unchanged from initial                   HEART Risk Score    Flowsheet Row Most Recent Value   Heart Score Risk Calculator    History 0 Filed at: 10/14/2022 0001   ECG 0 Filed at: 10/14/2022 0001   Age 2 Filed at: 10/14/2022 0001   Risk Factors 2 Filed at: 10/14/2022 0001   Troponin 0 Filed at: 10/14/2022 0001   HEART Score 4 Filed at: 10/14/2022 0001 SBIRT 20yo+    Flowsheet Row Most Recent Value   SBIRT (23 yo +)    In order to provide better care to our patients, we are screening all of our patients for alcohol and drug use  Would it be okay to ask you these screening questions? Yes Filed at: 10/13/2022 2019   Initial Alcohol Screen: US AUDIT-C     1  How often do you have a drink containing alcohol? 0 Filed at: 10/13/2022 2019   2  How many drinks containing alcohol do you have on a typical day you are drinking? 0 Filed at: 10/13/2022 2019   3a  Male UNDER 65: How often do you have five or more drinks on one occasion? 0 Filed at: 10/13/2022 2019   3b  FEMALE Any Age, or MALE 65+: How often do you have 4 or more drinks on one occassion? 0 Filed at: 10/13/2022 2019   Audit-C Score 0 Filed at: 10/13/2022 2019   ALAN: How many times in the past year have you    Used an illegal drug or used a prescription medication for non-medical reasons? Never Filed at: 10/13/2022 2019                MDM  Number of Diagnoses or Management Options  Altered mental status  Elevated blood pressure reading  UTI (urinary tract infection)  Diagnosis management comments: 29-year-old male, history of COPD on 2 L of oxygen at baseline, presenting for evaluation of his mental status in the setting of hypoxia  Patient has since returned to his baseline mental status  Denies any complaints at this time  On exam the patient is chronically ill-appearing but is not in any apparent distress, lungs were clear to auscultation bilaterally, no increased work of breathing, remainder of exam was benign  Patient is satting well on his baseline 2 L  I suspect the patient's episode of altered mental status was likely due to hypoxia from not wearing his oxygen  My differential diagnosis also includes but is not limited to UTI, intracranial hemorrhage  Plan:  CBC, CMP, troponin, EKG, chest x-ray, CT head, UA  Patient's evaluation was significant for urinary tract infection  Patient given 1st dose of antibiotics in the emergency department in the remainder of the prescription was sent to his pharmacy  Remainder of the workup was unremarkable  The patient and his family were informed of the results of his evaluation  Recommended taking antibiotics as prescribed, close outpatient follow-up, and given strict return to ED precautions  All questions were answered at this time  I reviewed all testing with the patient:  See above  I gave oral return precautions for what to return for in addition to the written return precautions  The patient (and any family present:  Wife, son) verbalized understanding of the discharge instructions and warnings that would necessitate return to the Emergency Department  I specifically highlighted areas of special concern regarding the written and verbal discharge instructions and return precautions  All questions were answered prior to discharge  A English to [address]  was used for the entirety of this patient encounter  Amount and/or Complexity of Data Reviewed  Clinical lab tests: reviewed  Tests in the radiology section of CPT®: reviewed  Tests in the medicine section of CPT®: reviewed        Disposition  Final diagnoses:   UTI (urinary tract infection)   Altered mental status   Elevated blood pressure reading     Time reflects when diagnosis was documented in both MDM as applicable and the Disposition within this note     Time User Action Codes Description Comment    10/13/2022 11:29 PM Volanda Mcburney Add [N39 0] UTI (urinary tract infection)     10/13/2022 11:29 PM Volanda Mcburney Add [R41 82] Altered mental status     10/13/2022 11:46 PM Volanda Mcburney Add [R03 0] Elevated blood pressure reading       ED Disposition     ED Disposition   Discharge    Condition   Stable    Date/Time   Thu Oct 13, 2022 11:45 PM    Comment   Brian Lockhart discharge to home/self care                 Follow-up Information     Follow up With Specialties Details Why Contact Info Additional Information    Yael Concepcion MD Family Medicine  For Emergency Department Follow-up [address]  [address] [place] [address]  [id number]  [address] [facility] Emergency Department Emergency Medicine  If symptoms worsen [facility] [id number]  [address] [facility] Emergency Department, [address] [place] [address], [id number]          Discharge Medication List as of 10/13/2022 11:46 PM      START taking these medications    Details   ciprofloxacin (CIPRO) 500 mg tablet Take 1 tablet (500 mg total) by mouth every 12 (twelve) hours for 7 days, Starting Thu 10/13/2022, Until Thu 10/20/2022, Normal         CONTINUE these medications which have NOT CHANGED    Details   albuterol (PROVENTIL HFA,VENTOLIN HFA) 90 mcg/act inhaler Inhale 2 puffs 4 (four) times a day, Starting Tue 10/11/2022, Normal      furosemide (LASIX) 20 mg tablet Take 1 tablet (20 mg total) by mouth daily, Starting Tue 10/11/2022, Normal      metoprolol tartrate (LOPRESSOR) 50 mg tablet Take 1 tablet (50 mg total) by mouth every 12 (twelve) hours, Starting Tue 10/11/2022, Normal      pantoprazole (PROTONIX) 40 mg tablet Starting Tue 10/11/2022, Historical Med      potassium chloride (K-DUR,KLOR-CON) 20 mEq tablet Daily for 3 days then EOD when taking Lasix pill, Normal      tiotropium (SPIRIVA) 18 mcg inhalation capsule Place 1 capsule (18 mcg total) into inhaler and inhale daily, Starting Tue 10/11/2022, Normal           No discharge procedures on file  PDMP Review     None           ED Provider  Attending physically available and evaluated [facility]  I managed the patient along with the ED Attending      Electronically Signed by         Jessica Brower DO  10/14/22 0004

## 2022-10-17 ENCOUNTER — TELEPHONE (OUTPATIENT)
Dept: ADMINISTRATIVE | Facility: OTHER | Age: 87
End: 2022-10-17

## 2022-10-17 NOTE — TELEPHONE ENCOUNTER
Upon review of the In Basket request we were able to locate, review, and update the patient chart as requested for Medicare AW  Any additional questions or concerns should be emailed to the Practice Liaisons via the appropriate education email address, please do not reply via In Basket      Thank you  Jennifer Henriquez

## 2022-10-17 NOTE — TELEPHONE ENCOUNTER
----- Message from Dariel Farley sent at 10/14/2022  3:41 PM EDT -----  Regarding: care gap request  10/14/22 3:41 PM    Hello, our patient attached above has had Medicare AWV completed/performed  Please assist in updating the patient chart by pulling the document from Encounter Tab within Chart Review  The date of service is 07/27/2022       Thank you,  [address]

## 2022-11-15 DIAGNOSIS — J44.9 COPD (CHRONIC OBSTRUCTIVE PULMONARY DISEASE) (HCC): ICD-10-CM

## 2022-11-21 RX ORDER — TIOTROPIUM BROMIDE 18 UG/1
CAPSULE ORAL; RESPIRATORY (INHALATION)
Qty: 30 CAPSULE | Refills: 5 | Status: ON HOLD | OUTPATIENT
Start: 2022-11-21

## 2022-11-28 DIAGNOSIS — I71.20 THORACIC AORTIC ANEURYSM WITHOUT RUPTURE: ICD-10-CM

## 2022-11-29 RX ORDER — METOPROLOL TARTRATE 50 MG/1
TABLET, FILM COATED ORAL
Qty: 60 TABLET | Refills: 0 | Status: SHIPPED | OUTPATIENT
Start: 2022-11-29 | End: 2022-12-09 | Stop reason: ALTCHOICE

## 2022-12-04 ENCOUNTER — HOSPITAL ENCOUNTER (INPATIENT)
Facility: HOSPITAL | Age: 87
LOS: 2 days | Discharge: HOME | End: 2022-12-06
Attending: EMERGENCY MEDICINE | Admitting: STUDENT IN AN ORGANIZED HEALTH CARE EDUCATION/TRAINING PROGRAM

## 2022-12-04 ENCOUNTER — APPOINTMENT (EMERGENCY)
Dept: RADIOLOGY | Facility: HOSPITAL | Age: 87
End: 2022-12-04

## 2022-12-04 DIAGNOSIS — J44.1 CHRONIC OBSTRUCTIVE PULMONARY DISEASE WITH ACUTE EXACERBATION (HCC): ICD-10-CM

## 2022-12-04 DIAGNOSIS — J96.92 RESPIRATORY FAILURE WITH HYPERCAPNIA (HCC): Primary | ICD-10-CM

## 2022-12-04 PROBLEM — J96.20 ACUTE ON CHRONIC RESPIRATORY FAILURE (HCC): Status: ACTIVE | Noted: 2022-12-04

## 2022-12-04 PROBLEM — I16.0 HYPERTENSIVE URGENCY: Status: ACTIVE | Noted: 2022-12-04

## 2022-12-04 LAB
2HR DELTA HS TROPONIN: -2 NG/L
4HR DELTA HS TROPONIN: -1 NG/L
ALBUMIN SERPL BCP-MCNC: 4.4 G/DL (ref 3.5–5)
ALP SERPL-CCNC: 96 U/L (ref 43–122)
ALT SERPL W P-5'-P-CCNC: 23 U/L
ANION GAP SERPL CALCULATED.3IONS-SCNC: 9 MMOL/L (ref 5–14)
APTT PPP: 30 SECONDS (ref 23–37)
ARTERIAL PATENCY WRIST A: NO
AST SERPL W P-5'-P-CCNC: 43 U/L (ref 17–59)
BASE EX.OXY STD BLDV CALC-SCNC: 40.8 % (ref 60–80)
BASE EXCESS BLDA CALC-SCNC: 13.5 MMOL/L
BASE EXCESS BLDV CALC-SCNC: 8.2 MMOL/L
BASOPHILS # BLD AUTO: 0.04 THOUSANDS/ÂΜL (ref 0–0.1)
BASOPHILS NFR BLD AUTO: 1 % (ref 0–1)
BILIRUB SERPL-MCNC: 0.91 MG/DL (ref 0.2–1)
BODY TEMPERATURE: 98.9 DEGREES FEHRENHEIT
BUN SERPL-MCNC: 21 MG/DL (ref 5–25)
CALCIUM SERPL-MCNC: 9.6 MG/DL (ref 8.4–10.2)
CARDIAC TROPONIN I PNL SERPL HS: 25 NG/L
CARDIAC TROPONIN I PNL SERPL HS: 26 NG/L
CARDIAC TROPONIN I PNL SERPL HS: 27 NG/L
CHLORIDE SERPL-SCNC: 93 MMOL/L (ref 96–108)
CO2 SERPL-SCNC: 40 MMOL/L (ref 21–32)
CREAT SERPL-MCNC: 1.01 MG/DL (ref 0.7–1.5)
EOSINOPHIL # BLD AUTO: 0.19 THOUSAND/ÂΜL (ref 0–0.61)
EOSINOPHIL NFR BLD AUTO: 3 % (ref 0–6)
ERYTHROCYTE [DISTWIDTH] IN BLOOD BY AUTOMATED COUNT: 13.9 % (ref 11.6–15.1)
FLUAV RNA RESP QL NAA+PROBE: NEGATIVE
FLUBV RNA RESP QL NAA+PROBE: NEGATIVE
GFR SERPL CREATININE-BSD FRML MDRD: 67 ML/MIN/1.73SQ M
GLUCOSE SERPL-MCNC: 102 MG/DL (ref 70–99)
HCO3 BLDA-SCNC: 43.7 MMOL/L (ref 22–28)
HCO3 BLDV-SCNC: 38.8 MMOL/L (ref 24–30)
HCT VFR BLD AUTO: 52.7 % (ref 36.5–49.3)
HGB BLD-MCNC: 15 G/DL (ref 12–17)
IMM GRANULOCYTES # BLD AUTO: 0.04 THOUSAND/UL (ref 0–0.2)
IMM GRANULOCYTES NFR BLD AUTO: 1 % (ref 0–2)
INR PPP: 1.11 (ref 0.84–1.19)
IPAP: 6
LACTATE SERPL-SCNC: 1.4 MMOL/L (ref 0.5–2)
LYMPHOCYTES # BLD AUTO: 0.7 THOUSANDS/ÂΜL (ref 0.6–4.47)
LYMPHOCYTES NFR BLD AUTO: 9 % (ref 14–44)
MCH RBC QN AUTO: 30.5 PG (ref 26.8–34.3)
MCHC RBC AUTO-ENTMCNC: 28.5 G/DL (ref 31.4–37.4)
MCV RBC AUTO: 107 FL (ref 82–98)
MONOCYTES # BLD AUTO: 0.73 THOUSAND/ÂΜL (ref 0.17–1.22)
MONOCYTES NFR BLD AUTO: 10 % (ref 4–12)
NEUTROPHILS # BLD AUTO: 5.92 THOUSANDS/ÂΜL (ref 1.85–7.62)
NEUTS SEG NFR BLD AUTO: 76 % (ref 43–75)
NON VENT ROOM AIR: 50 %
NON VENT- BIPAP: ABNORMAL
NRBC BLD AUTO-RTO: 0 /100 WBCS
NT-PROBNP SERPL-MCNC: 2430 PG/ML (ref 0–299)
O2 CT BLDA-SCNC: 20 ML/DL (ref 16–23)
O2 CT BLDV-SCNC: 9.1 ML/DL
OXYHGB MFR BLDA: 95 % (ref 94–97)
PCO2 BLDA: 83.5 MM HG (ref 36–44)
PCO2 BLDV: 83.4 MM HG (ref 42–50)
PH BLDA: 7.34 [PH] (ref 7.35–7.45)
PH BLDV: 7.29 [PH] (ref 7.3–7.4)
PLATELET # BLD AUTO: 149 THOUSANDS/UL (ref 149–390)
PMV BLD AUTO: 9 FL (ref 8.9–12.7)
PO2 BLDA: 94.4 MM HG (ref 75–129)
PO2 BLDV: 25.3 MM HG (ref 35–45)
POTASSIUM SERPL-SCNC: 4.5 MMOL/L (ref 3.5–5.3)
PROCALCITONIN SERPL-MCNC: 0.18 NG/ML
PROT SERPL-MCNC: 9.5 G/DL (ref 6.4–8.4)
PROTHROMBIN TIME: 14.6 SECONDS (ref 11.6–14.5)
RBC # BLD AUTO: 4.91 MILLION/UL (ref 3.88–5.62)
RSV RNA RESP QL NAA+PROBE: NEGATIVE
SARS-COV-2 RNA RESP QL NAA+PROBE: NEGATIVE
SODIUM SERPL-SCNC: 142 MMOL/L (ref 135–147)
SPECIMEN SOURCE: ABNORMAL
WBC # BLD AUTO: 7.62 THOUSAND/UL (ref 4.31–10.16)

## 2022-12-04 RX ORDER — FLUTICASONE FUROATE AND VILANTEROL 100; 25 UG/1; UG/1
1 POWDER RESPIRATORY (INHALATION) DAILY
Status: DISCONTINUED | OUTPATIENT
Start: 2022-12-05 | End: 2022-12-06 | Stop reason: HOSPADM

## 2022-12-04 RX ORDER — FUROSEMIDE 10 MG/ML
40 INJECTION INTRAMUSCULAR; INTRAVENOUS
Status: DISCONTINUED | OUTPATIENT
Start: 2022-12-04 | End: 2022-12-06 | Stop reason: HOSPADM

## 2022-12-04 RX ORDER — LABETALOL HYDROCHLORIDE 5 MG/ML
5 INJECTION, SOLUTION INTRAVENOUS ONCE
Status: COMPLETED | OUTPATIENT
Start: 2022-12-04 | End: 2022-12-04

## 2022-12-04 RX ORDER — FUROSEMIDE 10 MG/ML
20 INJECTION INTRAMUSCULAR; INTRAVENOUS ONCE
Status: COMPLETED | OUTPATIENT
Start: 2022-12-04 | End: 2022-12-04

## 2022-12-04 RX ORDER — METHYLPREDNISOLONE SODIUM SUCCINATE 40 MG/ML
40 INJECTION, POWDER, LYOPHILIZED, FOR SOLUTION INTRAMUSCULAR; INTRAVENOUS EVERY 8 HOURS SCHEDULED
Status: DISCONTINUED | OUTPATIENT
Start: 2022-12-04 | End: 2022-12-05

## 2022-12-04 RX ORDER — METOPROLOL TARTRATE 50 MG/1
50 TABLET, FILM COATED ORAL EVERY 12 HOURS
Status: DISCONTINUED | OUTPATIENT
Start: 2022-12-04 | End: 2022-12-06 | Stop reason: HOSPADM

## 2022-12-04 RX ORDER — METHYLPREDNISOLONE SODIUM SUCCINATE 125 MG/2ML
125 INJECTION, POWDER, LYOPHILIZED, FOR SOLUTION INTRAMUSCULAR; INTRAVENOUS ONCE
Status: COMPLETED | OUTPATIENT
Start: 2022-12-04 | End: 2022-12-04

## 2022-12-04 RX ORDER — SODIUM CHLORIDE FOR INHALATION 0.9 %
3 VIAL, NEBULIZER (ML) INHALATION ONCE
Status: COMPLETED | OUTPATIENT
Start: 2022-12-04 | End: 2022-12-04

## 2022-12-04 RX ORDER — PANTOPRAZOLE SODIUM 40 MG/1
40 TABLET, DELAYED RELEASE ORAL
Status: DISCONTINUED | OUTPATIENT
Start: 2022-12-05 | End: 2022-12-06 | Stop reason: HOSPADM

## 2022-12-04 RX ORDER — LABETALOL HYDROCHLORIDE 5 MG/ML
20 INJECTION, SOLUTION INTRAVENOUS EVERY 4 HOURS PRN
Status: DISCONTINUED | OUTPATIENT
Start: 2022-12-04 | End: 2022-12-06 | Stop reason: HOSPADM

## 2022-12-04 RX ORDER — ENOXAPARIN SODIUM 100 MG/ML
40 INJECTION SUBCUTANEOUS DAILY
Status: DISCONTINUED | OUTPATIENT
Start: 2022-12-05 | End: 2022-12-06 | Stop reason: HOSPADM

## 2022-12-04 RX ORDER — OSELTAMIVIR PHOSPHATE 75 MG/1
75 CAPSULE ORAL EVERY 12 HOURS SCHEDULED
Status: DISCONTINUED | OUTPATIENT
Start: 2022-12-04 | End: 2022-12-06 | Stop reason: HOSPADM

## 2022-12-04 RX ORDER — ALBUTEROL SULFATE 90 UG/1
2 AEROSOL, METERED RESPIRATORY (INHALATION) 4 TIMES DAILY
Status: DISCONTINUED | OUTPATIENT
Start: 2022-12-04 | End: 2022-12-05

## 2022-12-04 RX ORDER — IPRATROPIUM BROMIDE AND ALBUTEROL SULFATE 2.5; .5 MG/3ML; MG/3ML
3 SOLUTION RESPIRATORY (INHALATION)
Status: DISCONTINUED | OUTPATIENT
Start: 2022-12-04 | End: 2022-12-05

## 2022-12-04 RX ORDER — AZITHROMYCIN 250 MG/1
500 TABLET, FILM COATED ORAL EVERY 24 HOURS
Status: DISCONTINUED | OUTPATIENT
Start: 2022-12-04 | End: 2022-12-06 | Stop reason: HOSPADM

## 2022-12-04 RX ADMIN — FUROSEMIDE 20 MG: 10 INJECTION, SOLUTION INTRAMUSCULAR; INTRAVENOUS at 14:45

## 2022-12-04 RX ADMIN — AZITHROMYCIN MONOHYDRATE 500 MG: 250 TABLET ORAL at 17:45

## 2022-12-04 RX ADMIN — LABETALOL HYDROCHLORIDE 5 MG: 5 INJECTION INTRAVENOUS at 14:05

## 2022-12-04 RX ADMIN — FUROSEMIDE 40 MG: 10 INJECTION, SOLUTION INTRAVENOUS at 17:47

## 2022-12-04 RX ADMIN — ISODIUM CHLORIDE 3 ML: 0.03 SOLUTION RESPIRATORY (INHALATION) at 13:35

## 2022-12-04 RX ADMIN — NITROGLYCERIN 1 INCH: 20 OINTMENT TOPICAL at 14:48

## 2022-12-04 RX ADMIN — IPRATROPIUM BROMIDE AND ALBUTEROL SULFATE 3 ML: .5; 3 SOLUTION RESPIRATORY (INHALATION) at 20:36

## 2022-12-04 RX ADMIN — IPRATROPIUM BROMIDE 1 MG: 0.5 SOLUTION RESPIRATORY (INHALATION) at 13:35

## 2022-12-04 RX ADMIN — LABETALOL HYDROCHLORIDE 5 MG: 5 INJECTION INTRAVENOUS at 14:43

## 2022-12-04 RX ADMIN — METHYLPREDNISOLONE SODIUM SUCCINATE 125 MG: 125 INJECTION, POWDER, FOR SOLUTION INTRAMUSCULAR; INTRAVENOUS at 13:35

## 2022-12-04 RX ADMIN — OSELTAMIVIR 75 MG: 75 CAPSULE ORAL at 20:41

## 2022-12-04 RX ADMIN — METHYLPREDNISOLONE SODIUM SUCCINATE 40 MG: 40 INJECTION, POWDER, FOR SOLUTION INTRAMUSCULAR; INTRAVENOUS at 22:32

## 2022-12-04 RX ADMIN — METOPROLOL TARTRATE 50 MG: 50 TABLET, FILM COATED ORAL at 20:42

## 2022-12-04 RX ADMIN — ALBUTEROL SULFATE 10 MG: 2.5 SOLUTION RESPIRATORY (INHALATION) at 13:35

## 2022-12-04 NOTE — ASSESSMENT & PLAN NOTE
Patient on 2 L nasal cannula chronically  Albuterol and Spiriva at home    -continue IV steroids overnight  -azithromycin  -incentive spirometry  -DuoNebs  -respiratory protocol

## 2022-12-04 NOTE — ED PROVIDER NOTES
History  Chief Complaint   Patient presents with   • Cough     Started 2 days ago   • Generalized Body Aches     46-year-old male with past medical history of type 2 diabetes mellitus, COPD, CHF, hypertension, PVD, CAD, BPH, anemia, diverticulosis, DVT presents to emergency department or shortness of breath, cough, myalgias, congestion  Cough x3 days  Yellow sputum  Worsening shortness of breath today  On 2 L of oxygen at home for his COPD  Reports he has been having to use his nebulizer frequently over the past few days  Last used last night  They deny over-the-counter medications  They are unsure of fevers  Wife is sick with similar symptoms  He denies any chest pain, abdominal pain, back pain  He still able to eat and drink without difficulty  Took his metoprolol and furosemide this morning  History provided by:  Patient and relative  History limited by:  Severe respiratory distress   used: Yes    Cough  Cough characteristics:  Productive  Sputum characteristics:  Yellow  Smoker: Former  Context: upper respiratory infection    Ineffective treatments:  Home nebulizer  Associated symptoms: myalgias, shortness of breath and sinus congestion    Associated symptoms: no chest pain, no diaphoresis, no fever, no headaches, no rash and no sore throat    Generalized Body Aches  Associated symptoms: congestion, cough, myalgias and shortness of breath    Associated symptoms: no abdominal pain, no chest pain, no diarrhea, no fever, no headaches, no rash, no sore throat and no vomiting        Prior to Admission Medications   Prescriptions Last Dose Informant Patient Reported? Taking?    Spiriva HandiHaler 18 MCG inhalation capsule   No No   Sig: PLACE ONE CAPSULE INTO INHALER AND INHALE DAILY   albuterol (PROVENTIL HFA,VENTOLIN HFA) 90 mcg/act inhaler   No No   Sig: Inhale 2 puffs 4 (four) times a day   furosemide (LASIX) 20 mg tablet   No No   Sig: Take 1 tablet (20 mg total) by mouth daily   metoprolol tartrate (LOPRESSOR) 50 mg tablet   No No   Sig: TAKE ONE TABLET BY MOUTH EVERY 12 HOURS   pantoprazole (PROTONIX) 40 mg tablet   Yes No   potassium chloride (K-DUR,KLOR-CON) 20 mEq tablet   No No   Sig: Daily for 3 days then EOD when taking Lasix pill      Facility-Administered Medications: None       Past Medical History:   Diagnosis Date   • Acute metabolic encephalopathy 40/42/2463   • Anemia    • Appendicolith    • Ascending aortic aneurysm     3 7   • Asthma    • BPH (benign prostatic hyperplasia)    • CAD (coronary artery disease)     noted on CT scan   • CHF (congestive heart failure) ([facility])    • COPD (chronic obstructive pulmonary disease) ([facility])    • Descending thoracic aortic aneurysm    • Diabetes mellitus [facility] )    • Diverticulosis    • Former tobacco use    • GERD (gastroesophageal reflux disease)    • History of DVT (deep vein thrombosis)     Left leg   • History of transfusion    • Hypertension    • Inguinal hernia     right   • Nephrolithiasis    • Oxygen dependent     2LNC   • Oxygen dependent    • Pneumonia    • Pre-diabetes    • Prostate calculus    • PVD (peripheral vascular disease) ([facility])    • Ulcer    • Varicose vein of leg     b/l       Past Surgical History:   Procedure Laterality Date   • CARDIAC SURGERY     • ESOPHAGOGASTRODUODENOSCOPY     • HERNIA REPAIR Right 1/21/2019    Procedure: REPAIR HERNIA INGUINAL WITH MESH;  Surgeon: Mónica Verde MD;  Location: [address] [place] [address] OR;  Service: General   • INGUINAL HERNIA REPAIR Bilateral    • IR TEVAR  12/27/2018   • MD ENDOVASC TAA REINCL SUBCL N/A 12/27/2018    Procedure: TEVAR - endovascular thoracic aortic aneurysm repair;  Surgeon: Ivis Ortega MD;  Location: [facility] OR;  Service: Vascular   • THORACIC AORTIC ANEURYSM REPAIR  12/27/2018   • VARICOSE VEIN SURGERY Bilateral     vein stripping       Family History   Problem Relation Age of Onset   • Tuberculosis Mother    • No Known Problems Father    • Cancer Sister    • Diabetes Family    • Hypertension Family      I have reviewed and agree with the history as documented  E-Cigarette/Vaping   • E-Cigarette Use Never User      E-Cigarette/Vaping Substances     Social History     Tobacco Use   • Smoking status: Former     Packs/day: 1 00     Years: 35 00     Pack years: 35 00     Types: Cigarettes     Start date:      Quit date:      Years since quittin 9   • Smokeless tobacco: Never   Vaping Use   • Vaping Use: Never used   Substance Use Topics   • Alcohol use: Never   • Drug use: No       Review of Systems   Reason unable to perform ROS: Limited by respiratory distress relatives answering some questions  Constitutional: Negative for diaphoresis and fever  HENT: Positive for congestion  Negative for sore throat  Eyes: Negative for visual disturbance  Respiratory: Positive for cough and shortness of breath  Cardiovascular: Negative for chest pain  Gastrointestinal: Negative for abdominal pain, diarrhea and vomiting  Musculoskeletal: Positive for arthralgias and myalgias  Negative for back pain  Skin: Negative for rash  Neurological: Negative for dizziness, syncope, weakness, numbness and headaches  All other systems reviewed and are negative  Physical Exam  Physical Exam  Vitals and nursing note reviewed  Constitutional:       General: He is in acute distress  Appearance: Normal appearance  He is ill-appearing  He is not toxic-appearing or diaphoretic  HENT:      Head: Normocephalic and atraumatic  Nose: Congestion present  Mouth/Throat:      Mouth: Mucous membranes are moist       Pharynx: Oropharynx is clear  Eyes:      Extraocular Movements: Extraocular movements intact  Conjunctiva/sclera: Conjunctivae normal       Pupils: Pupils are equal, round, and reactive to light  Cardiovascular:      Rate and Rhythm: Normal rate and regular rhythm  Pulses: Normal pulses  Heart sounds: Normal heart sounds     Pulmonary: Effort: Respiratory distress present  No accessory muscle usage or retractions  Breath sounds: Decreased air movement present  No stridor  Wheezing and rales present  No decreased breath sounds or rhonchi  Comments: No Tripoding  After being put on nasal cannula, patient able to speak in full sentences  Abdominal:      General: There is no distension  Palpations: Abdomen is soft  Tenderness: There is no abdominal tenderness  Musculoskeletal:         General: No swelling  Skin:     General: Skin is warm and dry  Findings: No erythema or rash  Neurological:      Mental Status: He is alert, oriented to person, place, and time and easily aroused  Mental status is at baseline  GCS: GCS eye subscore is 4  GCS verbal subscore is 5  GCS motor subscore is 6  Gait: Gait normal    Psychiatric:         Behavior: Behavior is cooperative           Vital Signs  ED Triage Vitals [12/04/22 1307]   Temperature Pulse Respirations Blood Pressure SpO2   98 9 °F (37 2 °C) 88 20 164/78 (!) 36 %      Temp Source Heart Rate Source Patient Position - Orthostatic VS BP Location FiO2 (%)   Oral Monitor Sitting Right arm --      Pain Score       --           Vitals:    12/04/22 1603 12/04/22 1630 12/04/22 1642 12/04/22 1752   BP: (!) 189/93 160/88 164/89 (!) 176/84   Pulse: 78 76 81 76   Patient Position - Orthostatic VS:             Visual Acuity      ED Medications  Medications   metoprolol tartrate (LOPRESSOR) tablet 50 mg (has no administration in time range)   pantoprazole (PROTONIX) EC tablet 40 mg (has no administration in time range)   albuterol (PROVENTIL HFA,VENTOLIN HFA) inhaler 2 puff (has no administration in time range)   enoxaparin (LOVENOX) subcutaneous injection 40 mg (has no administration in time range)   azithromycin (ZITHROMAX) tablet 500 mg (500 mg Oral Given 12/4/22 5495)   ipratropium-albuterol (DUO-NEB) 0 5-2 5 mg/3 mL inhalation solution 3 mL (has no administration in time range)   methylPREDNISolone sodium succinate (Solu-MEDROL) injection 40 mg (has no administration in time range)   furosemide (LASIX) injection 40 mg (40 mg Intravenous Given 12/4/22 1747)   oseltamivir (TAMIFLU) capsule 75 mg (has no administration in time range)   labetalol (NORMODYNE) injection 20 mg (has no administration in time range)   Fluticasone Furoate-Vilanterol 100-25 mcg/actuation 1 puff (has no administration in time range)   albuterol inhalation solution 10 mg (10 mg Nebulization Given 12/4/22 1335)     And   ipratropium (ATROVENT) 0 02 % inhalation solution 1 mg (1 mg Nebulization Given 12/4/22 1335)     And   sodium chloride 0 9 % inhalation solution 3 mL (3 mL Nebulization Given 12/4/22 1335)   methylPREDNISolone sodium succinate (Solu-MEDROL) injection 125 mg (125 mg Intravenous Given 12/4/22 1335)   labetalol (NORMODYNE) injection 5 mg (5 mg Intravenous Given 12/4/22 1405)   furosemide (LASIX) injection 20 mg (20 mg Intravenous Given 12/4/22 1445)   nitroglycerin (NITRO-BID) 2 % TD ointment 1 inch (1 inch Topical Given 12/4/22 1448)   labetalol (NORMODYNE) injection 5 mg (5 mg Intravenous Given 12/4/22 1443)       Diagnostic Studies  Results Reviewed     Procedure Component Value Units Date/Time    HS Troponin I 4hr [[id number]] Collected: 12/04/22 1802    Lab Status: In process Specimen: Blood from Arm, Left Updated: 12/04/22 1807    Blood culture #1 [[id number]] Collected: 12/04/22 1329    Lab Status: Preliminary result Specimen: Blood from Arm, Right Updated: 12/04/22 1801     Blood Culture Received in Microbiology Lab  Culture in Progress  Blood culture #2 [[id number]] Collected: 12/04/22 1329    Lab Status: Preliminary result Specimen: Blood from Arm, Left Updated: 12/04/22 1801     Blood Culture Received in Microbiology Lab  Culture in Progress      Blood gas, arterial [[id number]]  (Abnormal) Collected: 12/04/22 1648    Lab Status: Final result Specimen: Blood, Arterial from Radial, Right Updated: 12/04/22 1708     pH, Arterial 7 337     pCO2, Arterial 83 5 mm Hg      pO2, Arterial 94 4 mm Hg      HCO3, Arterial 43 7 mmol/L      Base Excess, Arterial 13 5 mmol/L      O2 Content, Arterial 20 0 mL/dL      O2 HGB,Arterial  95 0 %      SOURCE Radial, Right     AVINASH TEST No     Temperature 98 9 Degrees Fehrenheit      ROOM AIR FIO2 50 %      Non Vent type BIPAP BIPAP     IPAP 6    HS Troponin I 2hr [[id number]]  (Normal) Collected: 12/04/22 1535    Lab Status: Final result Specimen: Blood from Arm, Left Updated: 12/04/22 1603     hs TnI 2hr 25 ng/L      Delta 2hr hsTnI -2 ng/L     FLU/RSV/COVID - if FLU/RSV clinically relevant [[id number]]  (Normal) Collected: 12/04/22 1329    Lab Status: Final result Specimen: Nares from Nose Updated: 12/04/22 1422     SARS-CoV-2 Negative     INFLUENZA A PCR Negative     INFLUENZA B PCR Negative     RSV PCR Negative    Narrative:      FOR PEDIATRIC PATIENTS - copy/paste COVID Guidelines URL to browser: [email or web address]  [employer]    SARS-CoV-2 assay is a Nucleic Acid Amplification assay intended for the  qualitative detection of nucleic acid from SARS-CoV-2 in nasopharyngeal  swabs  Results are for the presumptive identification of SARS-CoV-2 RNA  Positive results are indicative of infection with SARS-CoV-2, the virus  causing COVID-19, but do not rule out bacterial infection or co-infection  with other viruses  Laboratories within the [place] and its  territories are required to report all positive results to the appropriate  public health authorities  Negative results do not preclude SARS-CoV-2  infection and should not be used as the sole basis for treatment or other  patient management decisions  Negative results must be combined with  clinical observations, patient history, and epidemiological information  This test has not been FDA cleared or approved      This test has been authorized by FDA under an Emergency Use Authorization  (EUA)  This test is only authorized for the duration of time the  declaration that circumstances exist justifying the authorization of the  emergency use of an in vitro diagnostic tests for detection of SARS-CoV-2  virus and/or diagnosis of COVID-19 infection under section 564(b)(1) of  the Act, 21 U  S C  685TTC-6(Q)(2), unless the authorization is terminated  or revoked sooner  The test has been validated but independent review by FDA  and CLIA is pending  Test performed using [employer] Gene[employer]pert: This RT-PCR assay targets N2,  a region unique to SARS-CoV-2  A conserved region in the E-gene was chosen  for pan-Sarbecovirus detection which includes SARS-CoV-2  According to CMS-2020-01-R, this platform meets the definition of high-throughput technology      HS Troponin 0hr (reflex protocol) [[id number]]  (Normal) Collected: 12/04/22 1329    Lab Status: Final result Specimen: Blood from Arm, Left Updated: 12/04/22 1409     hs TnI 0hr 27 ng/L     Procalcitonin [[id number]]  (Normal) Collected: 12/04/22 1329    Lab Status: Final result Specimen: Blood from Arm, Left Updated: 12/04/22 1408     Procalcitonin 0 18 ng/ml     NT-BNP PRO [[id number]]  (Abnormal) Collected: 12/04/22 1329    Lab Status: Final result Specimen: Blood from Arm, Left Updated: 12/04/22 1404     NT-proBNP 2,430 pg/mL     Comprehensive metabolic panel [[id number]]  (Abnormal) Collected: 12/04/22 1329    Lab Status: Final result Specimen: Blood from Arm, Left Updated: 12/04/22 1401     Sodium 142 mmol/L      Potassium 4 5 mmol/L      Chloride 93 mmol/L      CO2 40 mmol/L      ANION GAP 9 mmol/L      BUN 21 mg/dL      Creatinine 1 01 mg/dL      Glucose 102 mg/dL      Calcium 9 6 mg/dL      AST 43 U/L      ALT 23 U/L      Alkaline Phosphatase 96 U/L      Total Protein 9 5 g/dL      Albumin 4 4 g/dL      Total Bilirubin 0 91 mg/dL      eGFR 67 ml/min/1 73sq m     Narrative:      [place] guidelines for Chronic Kidney Disease (CKD):   •  Stage 1 with normal or high GFR (GFR > 90 mL/min/1 73 square meters)  •  Stage 2 Mild CKD (GFR = 60-89 mL/min/1 73 square meters)  •  Stage 3A Moderate CKD (GFR = 45-59 mL/min/1 73 square meters)  •  Stage 3B Moderate CKD (GFR = 30-44 mL/min/1 73 square meters)  •  Stage 4 Severe CKD (GFR = 15-29 mL/min/1 73 square meters)  •  Stage 5 End Stage CKD (GFR <15 mL/min/1 73 square meters)  Note: GFR calculation is accurate only with a steady state creatinine    Protime-INR [[id number]]  (Abnormal) Collected: 12/04/22 1329    Lab Status: Final result Specimen: Blood from Arm, Left Updated: 12/04/22 1355     Protime 14 6 seconds      INR 1 11    APTT [[id number]]  (Normal) Collected: 12/04/22 1329    Lab Status: Final result Specimen: Blood from Arm, Left Updated: 12/04/22 1355     PTT 30 seconds     Lactic acid [[id number]]  (Normal) Collected: 12/04/22 1329    Lab Status: Final result Specimen: Blood from Arm, Left Updated: 12/04/22 1355     LACTIC ACID 1 4 mmol/L     Narrative:      Result may be elevated if tourniquet was used during collection      Blood gas, venous [[id number]]  (Abnormal) Collected: 12/04/22 1329    Lab Status: Final result Specimen: Blood from Arm, Left Updated: 12/04/22 1344     pH, Nabeel 7 286     pCO2, Nabeel 83 4 mm Hg      pO2, Nabeel 25 3 mm Hg      HCO3, Nabeel 38 8 mmol/L      Base Excess, Nabeel 8 2 mmol/L      O2 Content, Nabeel 9 1 ml/dL      O2 HGB, VENOUS 40 8 %     CBC and differential [[id number]]  (Abnormal) Collected: 12/04/22 1329    Lab Status: Final result Specimen: Blood from Arm, Left Updated: 12/04/22 1343     WBC 7 62 Thousand/uL      RBC 4 91 Million/uL      Hemoglobin 15 0 g/dL      Hematocrit 52 7 %       fL      MCH 30 5 pg      MCHC 28 5 g/dL      RDW 13 9 %      MPV 9 0 fL      Platelets 978 Thousands/uL      nRBC 0 /100 WBCs      Neutrophils Relative 76 %      Immat GRANS % 1 %      Lymphocytes Relative 9 %      Monocytes Relative 10 %      Eosinophils Relative 3 %      Basophils Relative 1 %      Neutrophils Absolute 5 92 Thousands/µL      Immature Grans Absolute 0 04 Thousand/uL      Lymphocytes Absolute 0 70 Thousands/µL      Monocytes Absolute 0 73 Thousand/µL      Eosinophils Absolute 0 19 Thousand/µL      Basophils Absolute 0 04 Thousands/µL                  XR chest portable    (Results Pending)              Procedures  CriticalCare Time  Performed by: Araceli Charles PA-C  Authorized by: Araceli Charles PA-C     Critical care provider statement:     Critical care time (minutes):  60    Critical care start time:  12/4/2022 1:07 PM    Critical care end time:  12/4/2022 5:06 PM    Critical care time was exclusive of:  Separately billable procedures and treating other patients and teaching time    Critical care was necessary to treat or prevent imminent or life-threatening deterioration of the following conditions:  Respiratory failure    Critical care was time spent personally by me on the following activities:  Obtaining history from patient or surrogate, development of treatment plan with patient or surrogate, evaluation of patient's response to treatment, examination of patient, re-evaluation of patient's condition, ordering and review of radiographic studies, ordering and review of laboratory studies and ordering and performing treatments and interventions    I assumed direction of critical care for this patient from another provider in my specialty: no               ED Course  ED Course as of 12/04/22 1818   Sun Dec 04, 2022   1327 Is on 2L of oxygen at home for COPD   Currently on 2 L nasal cannula pulse ox of 92%   1349 pH, Nabeel(!): 7 286   1402 Procedure Note: EKG  Date/Time: 12/04/22 2:02 PM   Performed by: Sandra Fisher by: Akbar Cooper  ECG interpreted by me, the ED Provider: yes   The EKG demonstrates:  Rate 76 bpm  Rhythm sinus rhythm with PACs  QTc 459 ms   No ST elevations/depressions     1403 Blood Pressure(!): 212/99  Will start with 5 mg IV labetalol, re-assess  He got his home AM BP meds, not due until night time doses  He is still denying any pain or weakness  1412 NT-proBNP(!): 2,430   1550 Patient on BiPAP right now  Will get ABG in 1 hour  1610 Discussed code status with patient  He does not want intubation or CPR  He is okay with BIPAP in the short term, does not want long term intervention  1634 Reassess 30 minutes after he is off BiPAP to see if he is stable for the floor  5 Wife is Influenza A positive      1637 Doing well on BiPAP  Active, talking to family members  1646 Patient no longer tolerating BiPAP  Is asking to come off    1708 pCO2, Arterial(!!): 83 5  [facility] informed                                              MDM  Number of Diagnoses or Management Options  Respiratory failure with hypercapnia ([address] )  Diagnosis management comments: Patient was hypoxic at 36% after ambulating to bed wheelchair  Had not been on his home oxygen since arrival  Is on 2 L at home for his COPD  Hypoxia rapidly returned to baseline levels with oxygen supplementation, only requiring nasal cannula  No chest pain, back pain, abdominal pain  Wheezing, decreased air movement noted on physical exam, no tachypnea  Improvement of air movement with DuoNeb, steroid  Chest x-ray without acute focal consolidation or PTX  No pleural effusion  vascular congestion noted  Elevated BNP  Will give dose of IV Lasix, consider BIPAP  ECG without acute ischemic changes  Infectious respiratory workup started  No leukocytosis  Normal lactic, procalcitonin  Kidney function, liver function within normal limits  Blood pressure continues to increase  Improvement with 2 5 mg doses of IV labetalol, nitroglycerin paste  Still elevated  Family agreed to attempt BiPAP  Symptomatic improvement noted after an hour  VSS for patient  Blood pressure is still improved, no additional medications or drips required this time    Acidosis, hypercarbia still noted however patient refused to continue BiPAP at this time  Wife found to be positive for influenza A  Suspect possible false negative for patient  Will admit for continued treatment of acute respiratory failure, CHF exacerbation, COPD exacerbation  All imaging and/or lab testing discussed with patient  Patient and/or family members verbalizes understanding and agrees with plan for admission  Patient is stable for admission      Portions of the record may have been created with voice recognition software  Occasional wrong word or "sound a like" substitutions may have occurred due to the inherent limitations of voice recognition software  Read the chart carefully and recognize, using context, where substitutions have occurred  Amount and/or Complexity of Data Reviewed  Clinical lab tests: ordered and reviewed  Tests in the radiology section of CPT®: ordered and reviewed  Obtain history from someone other than the patient: yes  Discuss the patient with other providers: yes (Dr Sarah Salazar; Respiratory Therapist; [facility] )        Disposition  Final diagnoses:   Respiratory failure with hypercapnia ([address] )     Time reflects when diagnosis was documented in both MDM as applicable and the Disposition within this note     Time User Action Codes Description Comment    12/4/2022  5:06 PM Isaura Bradford Add [J96 [id number]] Respiratory failure with hypercapnia [facility])       ED Disposition     ED Disposition   Admit    Condition   Stable    Date/Time   Sun Dec 4, 2022  5:06 PM    Comment   Case was discussed with MONICO and the patient's admission status was agreed to be Admission Status: inpatient status to the service of Dr Hernández Shock   Follow-up Information    None         Patient's Medications   Discharge Prescriptions    No medications on file       No discharge procedures on file      PDMP Review     None          ED Provider  Electronically Signed by           Tian Sosa PA-C  12/05/22 9522

## 2022-12-04 NOTE — ASSESSMENT & PLAN NOTE
Patient on 2 L nasal cannula chronically  Albuterol and Spiriva at home    -currently on 3 L oxygen with SpO2 90%,  -Will taper IV steroid to 40 mg q 12h  -azithromycin  -incentive spirometry  -Sridhar  -respiratory protocol

## 2022-12-04 NOTE — ASSESSMENT & PLAN NOTE
Patient presenting with blood pressure of 212/99  Received Lasix IV 20 mg once and labetalol IV 5 mg twice  Pressure improved after utilization of BiPAP    -BP acceptable, continue home metoprolol  -Lasix 40 mg IV b i d   -labetalol 20 mg IV q 4 hours for systolic blood pressure greater than 180  -will add more medications as necessary

## 2022-12-04 NOTE — ASSESSMENT & PLAN NOTE
Patient chronically on 2 L nasal cannula secondary to COPD  Presented to ED with shortness of breath and generalized malaise  Wife tested positive for flu  Required BiPAP in ED  ABG:  PH 7 33 pCO2 83 5, PO2 34 4 HCO3 43 7  COVID, RSV, flu negative  Procalcitonin negative  Troponins 27 -> 25  CXR showed Chronic pulmonary vascular congestion without zayda CHF  No acute findings    Improved after administration of BiPAP, now on 5 L nasal cannula    Possibly secondary to combinations of heart failure and COPD exacerbations as well as flu (suspect false negative)    -taper IV steroid to 40 mg q 12  -azithromycin  -incentive spirometry  -DuTyrell  -Breo Ellipta  -respiratory protocol  -continue Lasix IV 40 mg b i d   -titrate oxygen as tolerated  -Tamiflu 75 mg b i d  (suspect false negative given wife's positive status)  -fluid restriction 1500 mL  -monitor I/Os  -daily weights  -BiPAP p r n  and at nighttime

## 2022-12-04 NOTE — ASSESSMENT & PLAN NOTE
Patient chronically on 2 L nasal cannula secondary to COPD  Presented to ED with shortness of breath and generalized malaise  Wife tested positive for flu  Required BiPAP in ED  ABG:  PH 7 33 pCO2 83 5, PO2 34 4 HCO3 43 7  COVID, RSV, flu negative  Procalcitonin negative  Troponins 27 -> 25  CXR with venous congestion, pending formal read   Improved after administration of BiPAP, now on 5 L nasal cannula    Possibly secondary to combinations of heart failure and COPD exacerbations as well as flu (suspect false negative)    -continue IV steroids overnight  -azithromycin  -incentive spirometry  -DuTyrell  -Breo Ellipta  -respiratory protocol  -Lasix IV 40 mg b i d   -titrate oxygen as tolerated  -Tamiflu 75 mg b i d  (suspect false negative given wife's positive status)  -fluid restriction 2000 mL  -monitor I/Os  -daily weights

## 2022-12-04 NOTE — H&P
[facility]  H&P- Colleen Gear [birth year], 82 y o  male MRN: [id number]  Unit/Bed#: ED 02 Encounter: [id number]  Primary Care Provider: Mable Mcmullen MD   Date and time admitted to hospital: 12/4/2022  1:05 PM    * Acute on chronic respiratory failure [facility])  Assessment & Plan  Patient chronically on 2 L nasal cannula secondary to COPD  Presented to ED with shortness of breath and generalized malaise  Wife tested positive for flu  Required BiPAP in ED  ABG:  PH 7 33 pCO2 83 5, PO2 34 4 HCO3 43 7  COVID, RSV, flu negative  Procalcitonin negative  Troponins 27 -> 25  CXR with venous congestion, pending formal read   Improved after administration of BiPAP, now on 5 L nasal cannula    Possibly secondary to combinations of heart failure and COPD exacerbations as well as flu (suspect false negative)    -continue IV steroids overnight  -azithromycin  -incentive spirometry  -DuoNebs  -Breo Ellipta  -respiratory protocol  -Lasix IV 40 mg b i d   -titrate oxygen as tolerated  -Tamiflu 75 mg b i d  (suspect false negative given wife's positive status)  -fluid restriction 1500 mL  -monitor I/Os  -daily weights    Hypertensive urgency  Assessment & Plan  Patient presenting with blood pressure of 212/99  Received Lasix IV 20 mg once and labetalol IV 5 mg twice  Pressure improved after utilization of BiPAP    -restart home metoprolol  -Lasix 40 mg IV b i d   -labetalol 20 mg IV q 4 hours for systolic blood pressure greater than 180  -will add more medications as necessary    Acute on chronic diastolic congestive heart failure (HCC)  Assessment & Plan  Wt Readings from Last 3 Encounters:   10/13/22 62 1 kg (137 lb)   10/11/22 62 1 kg (137 lb)   09/22/22 61 5 kg (135 lb 8 oz)       Echocardiogram from 03/22 revealed EF 73% pain grade 1 diastolic dysfunction  Patient takes Lasix 20 mg p o   Daily home    -plan as above          Chronic obstructive pulmonary disease with acute exacerbation [facility])  Assessment & Plan  Patient on 2 L nasal cannula chronically  Albuterol and Spiriva at home    -continue IV steroids overnight  -azithromycin  -incentive spirometry  -DuoNebs  -respiratory protocol    VTE Pharmacologic Prophylaxis: VTE Score: 7 High Risk (Score >/= 5) - Pharmacological DVT Prophylaxis Ordered: enoxaparin (Lovenox)  Sequential Compression Devices Ordered  Code Status: Level 3 - DNAR and DNI   Discussion with family:  Discussed with son at bedside    Anticipated Length of Stay: Patient will be admitted on an inpatient basis with an anticipated length of stay of greater than 2 midnights secondary to Acute hypoxic respiratory failure  Total Time for Visit, including Counseling / Coordination of Care: 45 minutes Greater than 50% of this total time spent on direct patient counseling and coordination of care  Chief Complaint:  Shortness of breath    History of Present Illness:  Luiz Mac is a 82 y o  male with a PMH of heart failure with preserved ejection fraction, COPD chronically on 2 L, CAD, BPH, and GERD who presents with shortness of breath  Patient presented to ED for evaluation of shortness of breath and generalized malaise  States possibly had some fevers and that wife recently tested positive for flu  Reports 3 days of productive cough (yellow sputum) Upon presentation to ED, patient desaturating on baseline 2 L nasal cannula, he was temporarily placed on BiPAP due to desaturations  Patient was doing well after about an hour on BiPAP and saturating 95% on 5 L nasal cannula  Review of Systems:  Review of Systems   Constitutional: Positive for fatigue and fever  HENT: Positive for congestion  Eyes: Negative  Respiratory: Positive for cough and shortness of breath  Cardiovascular: Negative  Gastrointestinal: Negative  Endocrine: Negative  Genitourinary: Negative  Musculoskeletal: Negative  Allergic/Immunologic: Negative      Neurological: Negative  Hematological: Negative  Psychiatric/Behavioral: Negative  Past Medical and Surgical History:   Past Medical History:   Diagnosis Date   • Acute metabolic encephalopathy 01/32/5519   • Anemia    • Appendicolith    • Ascending aortic aneurysm     3 7   • Asthma    • BPH (benign prostatic hyperplasia)    • CAD (coronary artery disease)     noted on CT scan   • CHF (congestive heart failure) ([facility])    • COPD (chronic obstructive pulmonary disease) ([facility])    • Descending thoracic aortic aneurysm    • Diabetes mellitus ([address] )    • Diverticulosis    • Former tobacco use    • GERD (gastroesophageal reflux disease)    • History of DVT (deep vein thrombosis)     Left leg   • History of transfusion    • Hypertension    • Inguinal hernia     right   • Nephrolithiasis    • Oxygen dependent     2LNC   • Oxygen dependent    • Pneumonia    • Pre-diabetes    • Prostate calculus    • PVD (peripheral vascular disease) ([facility])    • Ulcer    • Varicose vein of leg     b/l       Past Surgical History:   Procedure Laterality Date   • CARDIAC SURGERY     • ESOPHAGOGASTRODUODENOSCOPY     • HERNIA REPAIR Right 1/21/2019    Procedure: REPAIR HERNIA INGUINAL WITH MESH;  Surgeon: Izabela Nam MD;  Location: [address] [place] [address];  Service: General   • INGUINAL HERNIA REPAIR Bilateral    • IR TEVAR  12/27/2018   • [place] ENDOVASC TAA REINCL SUBCL N/A 12/27/2018    Procedure: TEVAR - endovascular thoracic aortic aneurysm repair;  Surgeon: Keerthi Ortega MD;  Location:  MAIN OR;  Service: Vascular   • THORACIC AORTIC ANEURYSM REPAIR  12/27/2018   • VARICOSE VEIN SURGERY Bilateral     vein stripping       Meds/Allergies:  Prior to Admission medications    Medication Sig Start Date End Date Taking?  Authorizing Provider   albuterol (PROVENTIL HFA,VENTOLIN HFA) 90 mcg/act inhaler Inhale 2 puffs 4 (four) times a day 10/11/22   Caleb Frazier MD   furosemide (LASIX) 20 mg tablet Take 1 tablet (20 mg total) by mouth daily 10/11/22   Kirby MD Jocelyne   metoprolol tartrate (LOPRESSOR) 50 mg tablet TAKE ONE TABLET BY MOUTH EVERY 12 HOURS 22   Florin Thakkar MD   pantoprazole (PROTONIX) 40 mg tablet  10/11/22   Historical Provider, MD   potassium chloride (K-DUR,KLOR-CON) 20 mEq tablet Daily for 3 days then EOD when taking Lasix pill 10/11/22   Florin Thakkar MD   Spiriva HandiHaler 18 MCG inhalation capsule PLACE ONE CAPSULE INTO INHALER AND INHALE DAILY 22   Florin Thakkar MD     I have reviewed home medications with patient personally  Allergies: Allergies   Allergen Reactions   • Penicillins Hives, Itching and Rash   • Lisinopril Rash     Side pains and rash        Social History:  Marital Status: /Civil Union   Occupation:  Retired  Patient Pre-hospital Living Situation: Home  Patient Pre-hospital Level of Mobility: walks  Patient Pre-hospital Diet Restrictions:  None  Substance Use History:   Social History     Substance and Sexual Activity   Alcohol Use Never     Social History     Tobacco Use   Smoking Status Former   • Packs/day: 1 00   • Years: 35 00   • Pack years: 35 00   • Types: Cigarettes   • Start date:    • Quit date:    • Years since quittin 9   Smokeless Tobacco Never     Social History     Substance and Sexual Activity   Drug Use No       Family History:  Family History   Problem Relation Age of Onset   • Tuberculosis Mother    • No Known Problems Father    • Cancer Sister    • Diabetes Family    • Hypertension Family        Physical Exam:     Vitals:   Blood Pressure: 164/89 (22 1642)  Pulse: 81 (22 1642)  Temperature: 98 9 °F (37 2 °C) (22 1307)  Temp Source: Oral (22 1307)  Respirations: 20 (22 1642)  SpO2: 92 % (22 1642)    Physical Exam  Constitutional:       Appearance: He is ill-appearing  HENT:      Head: Normocephalic  Cardiovascular:      Rate and Rhythm: Normal rate and regular rhythm  Pulses: Normal pulses        Heart sounds: Normal heart sounds  Pulmonary:      Effort: Pulmonary effort is normal       Breath sounds: Normal breath sounds  Comments: Decreased breath sounds throughout all lung fields  Abdominal:      General: Abdomen is flat  Bowel sounds are normal       Palpations: Abdomen is soft  Musculoskeletal:         General: Normal range of motion  Cervical back: Normal range of motion  Skin:     General: Skin is warm and dry  Neurological:      General: No focal deficit present  Mental Status: He is alert and oriented to person, place, and time  Mental status is at baseline  Additional Data:     Lab Results:  Results from last 7 days   Lab Units 12/04/22  1329   WBC Thousand/uL 7 62   HEMOGLOBIN g/dL 15 0   HEMATOCRIT % 52 7*   PLATELETS Thousands/uL 149   NEUTROS PCT % 76*   LYMPHS PCT % 9*   MONOS PCT % 10   EOS PCT % 3     Results from last 7 days   Lab Units 12/04/22  1329   SODIUM mmol/L 142   POTASSIUM mmol/L 4 5   CHLORIDE mmol/L 93*   CO2 mmol/L 40*   BUN mg/dL 21   CREATININE mg/dL 1 01   ANION GAP mmol/L 9   CALCIUM mg/dL 9 6   ALBUMIN g/dL 4 4   TOTAL BILIRUBIN mg/dL 0 91   ALK PHOS U/L 96   ALT U/L 23   AST U/L 43   GLUCOSE RANDOM mg/dL 102*     Results from last 7 days   Lab Units 12/04/22  1329   INR  1 11             Results from last 7 days   Lab Units 12/04/22  1329   LACTIC ACID mmol/L 1 4   PROCALCITONIN ng/ml 0 18       Lines/Drains:  Invasive Devices     Peripheral Intravenous Line  Duration           Peripheral IV 12/04/22 Right Antecubital <1 day                    Imaging: No pertinent imaging reviewed  XR chest portable    (Results Pending)       EKG and Other Studies Reviewed on Admission:   · EKG:  Pending    ** Please Note: This note has been constructed using a voice recognition system   **

## 2022-12-04 NOTE — ASSESSMENT & PLAN NOTE
Wt Readings from Last 3 Encounters:   10/13/22 62 1 kg (137 lb)   10/11/22 62 1 kg (137 lb)   09/22/22 61 5 kg (135 lb 8 oz)       Echocardiogram from 03/22 revealed EF 73% pain grade 1 diastolic dysfunction  Patient takes Lasix 20 mg p o   Daily home    -plan as above

## 2022-12-04 NOTE — ASSESSMENT & PLAN NOTE
Patient presenting with blood pressure of 212/99  Received Lasix IV 20 mg once and labetalol IV 5 mg twice  Pressure improved after utilization of BiPAP    -restart home metoprolol  -Lasix 40 mg IV b i d   -labetalol 20 mg IV q 4 hours for systolic blood pressure greater than 180  -will add more medications as necessary

## 2022-12-05 LAB
ALBUMIN SERPL BCP-MCNC: 3.7 G/DL (ref 3.5–5)
ALP SERPL-CCNC: 78 U/L (ref 43–122)
ALT SERPL W P-5'-P-CCNC: 19 U/L
ANION GAP SERPL CALCULATED.3IONS-SCNC: 8 MMOL/L (ref 5–14)
AST SERPL W P-5'-P-CCNC: 33 U/L (ref 17–59)
ATRIAL RATE: 76 BPM
BASOPHILS # BLD AUTO: 0.01 THOUSANDS/ÂΜL (ref 0–0.1)
BASOPHILS NFR BLD AUTO: 0 % (ref 0–1)
BILIRUB SERPL-MCNC: 0.6 MG/DL (ref 0.2–1)
BUN SERPL-MCNC: 28 MG/DL (ref 5–25)
CALCIUM SERPL-MCNC: 8.9 MG/DL (ref 8.4–10.2)
CHLORIDE SERPL-SCNC: 93 MMOL/L (ref 96–108)
CO2 SERPL-SCNC: 40 MMOL/L (ref 21–32)
CREAT SERPL-MCNC: 1.1 MG/DL (ref 0.7–1.5)
EOSINOPHIL # BLD AUTO: 0 THOUSAND/ÂΜL (ref 0–0.61)
EOSINOPHIL NFR BLD AUTO: 0 % (ref 0–6)
ERYTHROCYTE [DISTWIDTH] IN BLOOD BY AUTOMATED COUNT: 13.7 % (ref 11.6–15.1)
GFR SERPL CREATININE-BSD FRML MDRD: 60 ML/MIN/1.73SQ M
GLUCOSE SERPL-MCNC: 202 MG/DL (ref 70–99)
HCT VFR BLD AUTO: 51.7 % (ref 36.5–49.3)
HGB BLD-MCNC: 14.7 G/DL (ref 12–17)
IMM GRANULOCYTES # BLD AUTO: 0.03 THOUSAND/UL (ref 0–0.2)
IMM GRANULOCYTES NFR BLD AUTO: 1 % (ref 0–2)
LYMPHOCYTES # BLD AUTO: 0.36 THOUSANDS/ÂΜL (ref 0.6–4.47)
LYMPHOCYTES NFR BLD AUTO: 7 % (ref 14–44)
MCH RBC QN AUTO: 30.2 PG (ref 26.8–34.3)
MCHC RBC AUTO-ENTMCNC: 28.4 G/DL (ref 31.4–37.4)
MCV RBC AUTO: 106 FL (ref 82–98)
MONOCYTES # BLD AUTO: 0.18 THOUSAND/ÂΜL (ref 0.17–1.22)
MONOCYTES NFR BLD AUTO: 3 % (ref 4–12)
NEUTROPHILS # BLD AUTO: 4.86 THOUSANDS/ÂΜL (ref 1.85–7.62)
NEUTS SEG NFR BLD AUTO: 89 % (ref 43–75)
NRBC BLD AUTO-RTO: 0 /100 WBCS
P AXIS: 70 DEGREES
PLATELET # BLD AUTO: 129 THOUSANDS/UL (ref 149–390)
PMV BLD AUTO: 9 FL (ref 8.9–12.7)
POTASSIUM SERPL-SCNC: 4 MMOL/L (ref 3.5–5.3)
PR INTERVAL: 140 MS
PROT SERPL-MCNC: 8 G/DL (ref 6.4–8.4)
QRS AXIS: 90 DEGREES
QRSD INTERVAL: 86 MS
QT INTERVAL: 408 MS
QTC INTERVAL: 459 MS
RBC # BLD AUTO: 4.86 MILLION/UL (ref 3.88–5.62)
SODIUM SERPL-SCNC: 141 MMOL/L (ref 135–147)
T WAVE AXIS: 55 DEGREES
VENTRICULAR RATE: 76 BPM
WBC # BLD AUTO: 5.44 THOUSAND/UL (ref 4.31–10.16)

## 2022-12-05 RX ORDER — IPRATROPIUM BROMIDE AND ALBUTEROL SULFATE 2.5; .5 MG/3ML; MG/3ML
3 SOLUTION RESPIRATORY (INHALATION) EVERY 6 HOURS PRN
Status: DISCONTINUED | OUTPATIENT
Start: 2022-12-05 | End: 2022-12-06 | Stop reason: HOSPADM

## 2022-12-05 RX ORDER — ALBUTEROL SULFATE 90 UG/1
2 AEROSOL, METERED RESPIRATORY (INHALATION) EVERY 4 HOURS PRN
Status: DISCONTINUED | OUTPATIENT
Start: 2022-12-05 | End: 2022-12-06 | Stop reason: HOSPADM

## 2022-12-05 RX ORDER — LEVALBUTEROL 1.25 MG/.5ML
1.25 SOLUTION, CONCENTRATE RESPIRATORY (INHALATION)
Status: DISCONTINUED | OUTPATIENT
Start: 2022-12-05 | End: 2022-12-06 | Stop reason: HOSPADM

## 2022-12-05 RX ORDER — METHYLPREDNISOLONE SODIUM SUCCINATE 40 MG/ML
40 INJECTION, POWDER, LYOPHILIZED, FOR SOLUTION INTRAMUSCULAR; INTRAVENOUS EVERY 12 HOURS SCHEDULED
Status: DISCONTINUED | OUTPATIENT
Start: 2022-12-05 | End: 2022-12-06 | Stop reason: HOSPADM

## 2022-12-05 RX ADMIN — IPRATROPIUM BROMIDE 0.5 MG: 0.5 SOLUTION RESPIRATORY (INHALATION) at 20:40

## 2022-12-05 RX ADMIN — LEVALBUTEROL HYDROCHLORIDE 1.25 MG: 1.25 SOLUTION, CONCENTRATE RESPIRATORY (INHALATION) at 08:42

## 2022-12-05 RX ADMIN — LEVALBUTEROL HYDROCHLORIDE 1.25 MG: 1.25 SOLUTION, CONCENTRATE RESPIRATORY (INHALATION) at 20:40

## 2022-12-05 RX ADMIN — LEVALBUTEROL HYDROCHLORIDE 1.25 MG: 1.25 SOLUTION, CONCENTRATE RESPIRATORY (INHALATION) at 14:51

## 2022-12-05 RX ADMIN — ENOXAPARIN SODIUM 40 MG: 100 INJECTION SUBCUTANEOUS at 09:20

## 2022-12-05 RX ADMIN — AZITHROMYCIN MONOHYDRATE 500 MG: 250 TABLET ORAL at 16:55

## 2022-12-05 RX ADMIN — METOPROLOL TARTRATE 50 MG: 50 TABLET, FILM COATED ORAL at 21:41

## 2022-12-05 RX ADMIN — METOPROLOL TARTRATE 50 MG: 50 TABLET, FILM COATED ORAL at 09:21

## 2022-12-05 RX ADMIN — OSELTAMIVIR 75 MG: 75 CAPSULE ORAL at 21:41

## 2022-12-05 RX ADMIN — FUROSEMIDE 40 MG: 10 INJECTION, SOLUTION INTRAVENOUS at 09:20

## 2022-12-05 RX ADMIN — METHYLPREDNISOLONE SODIUM SUCCINATE 40 MG: 40 INJECTION, POWDER, FOR SOLUTION INTRAMUSCULAR; INTRAVENOUS at 21:46

## 2022-12-05 RX ADMIN — PANTOPRAZOLE SODIUM 40 MG: 40 TABLET, DELAYED RELEASE ORAL at 06:34

## 2022-12-05 RX ADMIN — FLUTICASONE FUROATE AND VILANTEROL TRIFENATATE 1 PUFF: 100; 25 POWDER RESPIRATORY (INHALATION) at 09:21

## 2022-12-05 RX ADMIN — IPRATROPIUM BROMIDE 0.5 MG: 0.5 SOLUTION RESPIRATORY (INHALATION) at 14:51

## 2022-12-05 RX ADMIN — FUROSEMIDE 40 MG: 10 INJECTION, SOLUTION INTRAVENOUS at 16:11

## 2022-12-05 RX ADMIN — METHYLPREDNISOLONE SODIUM SUCCINATE 40 MG: 40 INJECTION, POWDER, FOR SOLUTION INTRAMUSCULAR; INTRAVENOUS at 06:34

## 2022-12-05 RX ADMIN — IPRATROPIUM BROMIDE 0.5 MG: 0.5 SOLUTION RESPIRATORY (INHALATION) at 08:42

## 2022-12-05 RX ADMIN — OSELTAMIVIR 75 MG: 75 CAPSULE ORAL at 09:21

## 2022-12-05 NOTE — RESPIRATORY THERAPY NOTE
Pt need bipap at night to keep to keep his SATs above 90% and from him going apneic, MD has agreed with th e Rt for the current settings on 14/6 and O2 to make sure to keep SATs above 90%

## 2022-12-05 NOTE — UTILIZATION REVIEW
Initial Clinical Review    Admission: Date/Time/Statement:   Admission Orders (From admission, onward)     Ordered        12/04/22 1708  INPATIENT ADMISSION  Once                      Orders Placed This Encounter   Procedures   • INPATIENT ADMISSION     Standing Status:   Standing     Number of Occurrences:   1     Order Specific Question:   Level of Care     Answer:   Med Surg [16]     Order Specific Question:   Estimated length of stay     Answer:   More than 2 Midnights     Order Specific Question:   Certification     Answer:   I certify that inpatient services are medically necessary for this patient for a duration of greater than two midnights  See H&P and MD Progress Notes for additional information about the patient's course of treatment  ED Arrival Information     Expected   -    Arrival   12/4/2022 12:25    Acuity   Urgent            Means of arrival   Walk-In    Escorted by   Family Member    Service   Hospitalist    Admission type   Emergency            Arrival complaint   sore throat / cough / fever           Chief Complaint   Patient presents with   • Cough     Started 2 days ago   • Generalized Body Aches       Initial Presentation: 82 y o  male who presented self from home to [address] [place], [place] [address] [facility] ED  Inpatient admission for evaluation and treatment of acute on chronic respiratory failure  PMHx: anemia, asthma, BPH, CAD, CHF, COPD on 2L O2 basline, T2DM, GERD, DVT, HTN, PVD  Presented w/ SOB, malaise  Notes partner recently tested positive for flu  Desaturating on 2L O2, temporarily placed on BiPAP for about 1 hour, and started on 5L O2 nasal cannula  On exam, ill-appearing, decreased breath sounds  BNP 2430  ABG:  PH 7 33 pCO2 83 5, PO2 34 4 HCO3 43 7  Respiratory panel negative, suspect false negative  CXR venous congestion   Plan: IV steroids q8h, nebs, azithromycin, incentive spirometry, bipap @ hs, IV lasix BID, tamiflu, 1500 mL fluid restriction, DW, I&O, continue PTA meds, Supplemental O2, weaned as tolerated to baseline  Date: 12/05/22   Day 2: Reports doing better overall  On exam, decreased breath sounds  On 2L O2 today  Plan: continue steroids taper to q12h this evening, azithromycin, nebs, IV lasix BID, tamiflu, 1500 mL fluid restriction, DW, I&O, Supplemental O2, weaned as tolerated to baseline, bipap @ HS, supportive care  Trend labs, replete electrolytes as needed      ED Triage Vitals   Temperature Pulse Respirations Blood Pressure SpO2   12/04/22 1307 12/04/22 1307 12/04/22 1307 12/04/22 1307 12/04/22 1307   98 9 °F (37 2 °C) 88 20 164/78 (!) 36 %      Temp Source Heart Rate Source Patient Position - Orthostatic VS BP Location FiO2 (%)   12/04/22 1307 12/04/22 1307 12/04/22 1307 12/04/22 1307 --   Oral Monitor Sitting Right arm       Pain Score       12/04/22 1830       No Pain          Wt Readings from Last 1 Encounters:   12/05/22 55 5 kg (122 lb 5 7 oz)     Additional Vital Signs:   Date/Time Temp Pulse Resp BP MAP (mmHg) SpO2 Calculated FIO2 (%) - Nasal Cannula Nasal Cannula O2 Flow Rate (L/min) O2 Device   12/05/22 0842 -- 65 19 -- -- 94 % 28 2 L/min Nasal cannula   12/05/22 0725 97 3 °F (36 3 °C) 63 20 157/87 -- 95 % -- -- BiPAP   12/05/22 0450 -- -- -- -- -- 97 % -- -- --   12/04/22 2208 97 9 °F (36 6 °C) 83 20 137/68 -- 92 % 32 3 L/min Nasal cannula   12/04/22 2036 -- -- -- -- -- 91 % -- -- --   12/04/22 2009 97 5 °F (36 4 °C) 77 22 144/78 95 90 % 32 3 L/min Nasal cannula   12/04/22 1900 -- -- 20 154/90 -- 91 % 32 3 L/min Nasal cannula   12/04/22 1830 98 6 °F (37 °C) 78 -- -- -- -- -- -- --   12/04/22 1752 -- 76 18 176/84 Abnormal  -- 93 % 32 3 L/min Nasal cannula   12/04/22 1642 -- 81 20 164/89 -- 92 % -- -- BiPAP   12/04/22 1630 -- 76 20 160/88 -- 96 % -- -- BiPAP   12/04/22 1606 -- -- -- -- -- 95 % -- -- --   12/04/22 1603 -- 78 20 189/93 Abnormal  -- 92 % 32 3 L/min Nasal cannula   12/04/22 1550 -- 74 20 173/98 Abnormal  -- 92 % 32 3 L/min Nasal cannula   12/04/22 1535 -- 73 20 179/118 Abnormal  -- 88 % Abnormal  32 3 L/min Nasal cannula   12/04/22 1513 -- 74 20 141/114 Abnormal  -- 88 % Abnormal  32 3 L/min Nasal cannula   12/04/22 1500 -- 77 20 217/109 Abnormal  -- 90 % 32 3 L/min Nasal cannula   12/04/22 1450 -- 76 -- 215/100 Abnormal  -- 88 % Abnormal  32 3 L/min Nasal cannula   12/04/22 1445 -- 81 20 190/104 Abnormal  -- 88 % Abnormal  32 3 L/min Nasal cannula   12/04/22 1441 -- 79 -- 217/106 Abnormal  -- 92 % 32 3 L/min Nasal cannula   12/04/22 1420 -- 82 20 222/105 Abnormal  -- 91 % -- -- --   12/04/22 1419 -- 82 -- 207/110 Abnormal  -- 91 % 32 3 L/min Nasal cannula   12/04/22 1400 -- 85 20 223/112 Abnormal  -- 91 % 32 3 L/min Nasal cannula   12/04/22 1341 -- 79 20 212/99 Abnormal  -- 91 % 32 3 L/min Nasal cannula   12/04/22 1307 98 9 °F (37 2 °C) 88 20 164/78 -- 36 % Abnormal  -- -- None (Room air)     Pertinent Labs/Diagnostic Test Results:   XR chest portable   Final Result by Kelechi Llanes MD (12/04 2138)      Chronic pulmonary vascular congestion without zayda CHF  No acute findings                    Workstation performed: [id number]           Results from last 7 days   Lab Units 12/04/22  1329   SARS-COV-2  Negative     Results from last 7 days   Lab Units 12/05/22  0443 12/04/22  1329   WBC Thousand/uL 5 44 7 62   HEMOGLOBIN g/dL 14 7 15 0   HEMATOCRIT % 51 7* 52 7*   PLATELETS Thousands/uL 129* 149   NEUTROS ABS Thousands/µL 4 86 5 92         Results from last 7 days   Lab Units 12/05/22  0443 12/04/22  1329   SODIUM mmol/L 141 142   POTASSIUM mmol/L 4 0 4 5   CHLORIDE mmol/L 93* 93*   CO2 mmol/L 40* 40*   ANION GAP mmol/L 8 9   BUN mg/dL 28* 21   CREATININE mg/dL 1 10 1 01   EGFR ml/min/1 73sq m 60 67   CALCIUM mg/dL 8 9 9 6     Results from last 7 days   Lab Units 12/05/22  0443 12/04/22  1329   AST U/L 33 43   ALT U/L 19 23   ALK PHOS U/L 78 96   TOTAL PROTEIN g/dL 8 0 9 5*   ALBUMIN g/dL 3 7 4 4   TOTAL BILIRUBIN mg/dL 0 60 0 91         Results from last 7 days   Lab Units 12/05/22  0443 12/04/22  1329   GLUCOSE RANDOM mg/dL 202* 102*     Results from last 7 days   Lab Units 12/04/22  1648   PH ART  7 337*   PCO2 ART mm Hg 83 5*   PO2 ART mm Hg 94 4   HCO3 ART mmol/L 43 7*   BASE EXC ART mmol/L 13 5   O2 CONTENT ART mL/dL 20 0   O2 HGB, ARTERIAL % 95 0   ABG SOURCE  Radial, Right     Results from last 7 days   Lab Units 12/04/22  1329   PH DUSTIN  7 286*   PCO2 DUSTIN mm Hg 83 4*   PO2 DUSTIN mm Hg 25 3*   HCO3 DUSTIN mmol/L 38 8*   BASE EXC DUSTIN mmol/L 8 2   O2 CONTENT DUSTIN ml/dL 9 1   O2 HGB, VENOUS % 40 8*     Results from last 7 days   Lab Units 12/04/22  1802 12/04/22  1535 12/04/22  1329   HS TNI 0HR ng/L  --   --  27   HS TNI 2HR ng/L  --  25  --    HSTNI D2 ng/L  --  -2  --    HS TNI 4HR ng/L 26  --   --    HSTNI D4 ng/L -1  --   --          Results from last 7 days   Lab Units 12/04/22  1329   PROTIME seconds 14 6*   INR  1 11   PTT seconds 30         Results from last 7 days   Lab Units 12/04/22  1329   PROCALCITONIN ng/ml 0 18     Results from last 7 days   Lab Units 12/04/22  1329   LACTIC ACID mmol/L 1 4     Results from last 7 days   Lab Units 12/04/22  1329   NT-PRO BNP pg/mL 2,430*       Results from last 7 days   Lab Units 12/04/22  1329   INFLUENZA A PCR  Negative   INFLUENZA B PCR  Negative   RSV PCR  Negative     Results from last 7 days   Lab Units 12/04/22  1329   BLOOD CULTURE  Received in Microbiology Lab  Culture in Progress  Received in Microbiology Lab  Culture in Progress           ED Treatment:   Medication Administration from 12/04/2022 1225 to 12/04/2022 1818       Date/Time Order Dose Route Action     12/04/2022 1335 EST albuterol inhalation solution 10 mg 10 mg Nebulization Given     12/04/2022 1335 EST ipratropium (ATROVENT) 0 02 % inhalation solution 1 mg 1 mg Nebulization Given     12/04/2022 1335 EST sodium chloride 0 9 % inhalation solution 3 mL 3 mL Nebulization Given     12/04/2022 1335 EST methylPREDNISolone sodium succinate (Solu-MEDROL) injection 125 mg 125 mg Intravenous Given     12/04/2022 1405 EST labetalol (NORMODYNE) injection 5 mg 5 mg Intravenous Given     12/04/2022 1445 EST furosemide (LASIX) injection 20 mg 20 mg Intravenous Given     12/04/2022 1448 EST nitroglycerin (NITRO-BID) 2 % TD ointment 1 inch 1 inch Topical Given     12/04/2022 1443 EST labetalol (NORMODYNE) injection 5 mg 5 mg Intravenous Given     12/04/2022 1745 EST azithromycin (ZITHROMAX) tablet 500 mg 500 mg Oral Given     12/04/2022 1747 EST furosemide (LASIX) injection 40 mg 40 mg Intravenous Given        Past Medical History:   Diagnosis Date   • Acute metabolic encephalopathy 56/81/6652   • Anemia    • Appendicolith    • Ascending aortic aneurysm     3 7   • Asthma    • BPH (benign prostatic hyperplasia)    • CAD (coronary artery disease)     noted on CT scan   • CHF (congestive heart failure) ([facility])    • COPD (chronic obstructive pulmonary disease) ([facility])    • Descending thoracic aortic aneurysm    • [facility] )    • Diverticulosis    • Former tobacco use    • GERD (gastroesophageal reflux disease)    • History of DVT (deep vein thrombosis)     Left leg   • History of transfusion    • Hypertension    • Inguinal hernia     right   • Nephrolithiasis    • Oxygen dependent     2LNC   • Oxygen dependent    • Pneumonia    • Pre-diabetes    • Prostate calculus    • PVD (peripheral vascular disease) ([facility])    • Ulcer    • Varicose vein of leg     b/l     Present on Admission:  • Chronic diastolic CHF (congestive heart failure) ([facility])  • Chronic obstructive pulmonary disease with acute exacerbation ([facility])  • Dementia in other diseases classified elsewhere, unspecified severity, without behavioral disturbance, psychotic disturbance, mood disturbance, and anxiety ([facility])      Admitting Diagnosis: Cough [R05 9]  Respiratory failure with hypercapnia ([facility] ) [J96 92]  Generalized body aches [R52]  Age/Sex: 82 y o  male  Admission Orders:  Cardiac Diet,  1500 mL fluid restriction  DW  I&O  SCDs  BiPAP @ HS  Telemetry  Scheduled Medications:  azithromycin, 500 mg, Oral, Q24H  enoxaparin, 40 mg, Subcutaneous, Daily  Fluticasone Furoate-Vilanterol, 1 puff, Inhalation, Daily  furosemide, 40 mg, Intravenous, BID (diuretic)  levalbuterol, 1 25 mg, Nebulization, TID   And  ipratropium, 0 5 mg, Nebulization, TID  methylPREDNISolone sodium succinate, 40 mg, Intravenous, Q8H KIKE  metoprolol tartrate, 50 mg, Oral, Q12H  oseltamivir, 75 mg, Oral, Q12H KIKE  pantoprazole, 40 mg, Oral, Early Morning    Continuous IV Infusions: none    PRN Meds:  albuterol, 2 puff, Inhalation, Q4H PRN  ipratropium-albuterol, 3 mL, Nebulization, Q6H PRN  labetalol, 20 mg, Intravenous, Q4H PRN        IP CONSULT TO NUTRITION SERVICES    Network Utilization Review Department  ATTENTION: Please call with any questions or concerns to [phone] and carefully listen to the prompts so that you are directed to the right person  All voicemails are confidential   Dari Rodgers all requests for admission clinical reviews, approved or denied determinations and any other requests to dedicated fax number below belonging to the campus where the patient is receiving treatment   List of dedicated fax numbers for the Facilities:  [address] DENIALS (Administrative/Medical Necessity) [phone]   [address] (Maternity/NICU/Pediatrics) [phone]   [address] [phone]   [facility] [phone]   [facility] [phone]   [facility] [place] [address] [address] [place] [place] [address] [place] [address] [address] [place]   [address] [facility] [phone]   [facility] [address] [place] [address] [phone]

## 2022-12-05 NOTE — ASSESSMENT & PLAN NOTE
Patient on 2 L nasal cannula chronically  Albuterol and Spiriva at home    -Patient refused using BiPAP last night and stated he does not want to use BiPAP anymore     -Currently on 3 L oxygen with SpO2 93%,  -Will be discharged on p o  steroids  -azithromycin  -incentive spirometry  -Sridhar  -respiratory protocol

## 2022-12-05 NOTE — ASSESSMENT & PLAN NOTE
Wt Readings from Last 3 Encounters:   12/05/22 55 5 kg (122 lb 5 7 oz)   10/13/22 62 1 kg (137 lb)   10/11/22 62 1 kg (137 lb)       Echocardiogram from 03/22 revealed EF 73% pain grade 1 diastolic dysfunction  Patient takes Lasix 20 mg p o   Daily home    -plan as above

## 2022-12-05 NOTE — PLAN OF CARE
Problem: SAFETY ADULT  Goal: Patient will remain free of falls  Description: INTERVENTIONS:  - Educate patient/family on patient safety including physical limitations  - Instruct patient to call for assistance with activity   - Consult OT/PT to assist with strengthening/mobility   - Keep Call bell within reach  - Keep bed low and locked with side rails adjusted as appropriate  - Keep care items and personal belongings within reach  - Initiate and maintain comfort rounds  - Make Fall Risk Sign visible to staff  - Offer Toileting every 2 Hours, in advance of need  - Initiate/Maintain bed alarm  - Obtain necessary fall risk management equipment: socks  Problem: DISCHARGE PLANNING  Goal: Discharge to home or other facility with appropriate resources  Description: INTERVENTIONS:  - Identify barriers to discharge w/patient and caregiver  - Arrange for needed discharge resources and transportation as appropriate  - Identify discharge learning needs (meds, wound care, etc )  - Arrange for interpretive services to assist at discharge as needed  - Refer to Case Management Department for coordinating discharge planning if the patient needs post-hospital services based on physician/advanced practitioner order or complex needs related to functional status, cognitive ability, or social support system  Outcome: Progressing     Problem: Knowledge Deficit  Goal: Patient/family/caregiver demonstrates understanding of disease process, treatment plan, medications, and discharge instructions  Description: Complete learning assessment and assess knowledge base    Interventions:  - Provide teaching at level of understanding  - Provide teaching via preferred learning methods  Outcome: Progressing     Problem: RESPIRATORY - ADULT  Goal: Achieves optimal ventilation and oxygenation  Description: INTERVENTIONS:  - Assess for changes in respiratory status  - Assess for changes in mentation and behavior  - Position to facilitate oxygenation and minimize respiratory effort  - Oxygen administered by appropriate delivery if ordered  - Initiate smoking cessation education as indicated  - Encourage broncho-pulmonary hygiene including cough, deep breathe, Incentive Spirometry  - Assess the need for suctioning and aspirate as needed  - Assess and instruct to report SOB or any respiratory difficulty  - Respiratory Therapy support as indicated  Outcome: Progressing     Problem: Prexisting or High Potential for Compromised Skin Integrity  Goal: Skin integrity is maintained or improved  Description: INTERVENTIONS:  - Identify patients at risk for skin breakdown  - Assess and monitor skin integrity  - Assess and monitor nutrition and hydration status  - Monitor labs   - Assess for incontinence   - Turn and reposition patient  - Assist with mobility/ambulation  - Relieve pressure over bony prominences  - Avoid friction and shearing  - Provide appropriate hygiene as needed including keeping skin clean and dry  - Evaluate need for skin moisturizer/barrier cream  - Collaborate with interdisciplinary team   - Patient/family teaching  - Consider wound care consult   Outcome: Progressing     Problem: Potential for Falls  Goal: Patient will remain free of falls  Description: INTERVENTIONS:  - Educate patient/family on patient safety including physical limitations  - Instruct patient to call for assistance with activity   - Consult OT/PT to assist with strengthening/mobility   - Keep Call bell within reach  - Keep bed low and locked with side rails adjusted as appropriate  - Keep care items and personal belongings within reach  - Initiate and maintain comfort rounds    Problem: MOBILITY - ADULT  Goal: Maintain or return to baseline ADL function  Description: INTERVENTIONS:  -  Assess patient's ability to carry out ADLs; assess patient's baseline for ADL function and identify physical deficits which impact ability to perform ADLs (bathing, care of mouth/teeth, toileting, grooming, dressing, etc )  - Assess/evaluate cause of self-care deficits   - Assess range of motion  - Assess patient's mobility; develop plan if impaired  - Assess patient's need for assistive devices and provide as appropriate  - Encourage maximum independence but intervene and supervise when necessary  - Involve family in performance of ADLs  - Assess for home care needs following discharge   - Consider OT consult to assist with ADL evaluation and planning for discharge  - Provide patient education as appropriate  Outcome: Progressing  Goal: Maintains/Returns to pre admission functional level  Description: INTERVENTIONS:  - Perform BMAT or MOVE assessment daily    - Set and communicate daily mobility goal to care team and patient/family/caregiver  - Collaborate with rehabilitation services on mobility goals if consulted    Problem: MOBILITY - ADULT  Goal: Maintain or return to baseline ADL function  Description: INTERVENTIONS:  -  Assess patient's ability to carry out ADLs; assess patient's baseline for ADL function and identify physical deficits which impact ability to perform ADLs (bathing, care of mouth/teeth, toileting, grooming, dressing, etc )  - Assess/evaluate cause of self-care deficits   - Assess range of motion  - Assess patient's mobility; develop plan if impaired  - Assess patient's need for assistive devices and provide as appropriate  - Encourage maximum independence but intervene and supervise when necessary  - Involve family in performance of ADLs  - Assess for home care needs following discharge   - Consider OT consult to assist with ADL evaluation and planning for discharge  - Provide patient education as appropriate  Outcome: Progressing  Goal: Maintains/Returns to pre admission functional level  Description: INTERVENTIONS:  - Perform BMAT or MOVE assessment daily    - Set and communicate daily mobility goal to care team and patient/family/caregiver     - Collaborate with rehabilitation services on mobility goals if consulted  - Out of bed for toileting  - Record patient progress and toleration of activity level   Outcome: Progressing   - Out of bed for toileting  - Record patient progress and toleration of activity level   Outcome: Progressing   - Apply yellow socks and bracelet for high fall risk patients  - Consider moving patient to room near nurses station  Outcome: Progressing     - Apply yellow socks and bracelet for high fall risk patients  - Consider moving patient to room near nurses station  Outcome: Progressing    Goal: Maintains/Returns to pre admission functional level  Description: INTERVENTIONS:  - Perform BMAT or MOVE assessment daily    - Set and communicate daily mobility goal to care team and patient/family/caregiver     - Collaborate with rehabilitation services on mobility goals if consulted  - Ambulate patient 2 times a day  - Out of bed to chair 3 times a day   - Out of bed for meals 3 times a day  - Out of bed for toileting  - Record patient progress and toleration of activity level   Outcome: Progressing

## 2022-12-05 NOTE — UTILIZATION REVIEW
NOTIFICATION OF INPATIENT ADMISSION   AUTHORIZATION REQUEST   SERVICING FACILITY:   [address] [place] [address]  [address] , [facility], [address] [place] [address]  Tax ID: [id number]  NPI: [id number] ATTENDING PROVIDER:  Attending Name and NPI#: Miguel Baxter Md [[id number]]  Address: [address] , [facility], [address] [place] [address]  Phone: [phone]     ADMISSION INFORMATION:  Place of Service: Inpatient [address] [facility]  [address]  Place of Service Code: 21  Inpatient Admission Date/Time: 12/4/22  5:08 PM  Discharge Date/Time: No discharge date for patient encounter  Admitting Diagnosis Code/Description:  Cough [R05 9]  Respiratory failure with hypercapnia ([address] ) [J96 92]  Generalized body aches [R52]     UTILIZATION REVIEW CONTACT:  Dipika Sheldon, Utilization   Network Utilization Review Department  Phone: [phone]  Fax [phone]  Email: [email or web address]  Contact for approvals/pending authorizations, clinical reviews, and discharge       PHYSICIAN ADVISORY SERVICES:  Medical Necessity Denial & [facility] Review  Phone: [phone]  Fax: [phone]  Email: [email or web address]

## 2022-12-05 NOTE — ASSESSMENT & PLAN NOTE
Patient chronically on 2 L nasal cannula secondary to COPD  Presented to ED with shortness of breath and generalized malaise  Wife tested positive for flu  Required BiPAP in ED  ABG:  PH 7 33 pCO2 83 5, PO2 34 4 HCO3 43 7  COVID, RSV, flu negative  Procalcitonin negative  Troponins 27 -> 25  CXR showed Chronic pulmonary vascular congestion without zayda CHF  No acute findings    Improved after administration of BiPAP, now on 5 L nasal cannula    Possibly secondary to COPD exacerbations as well as flu (suspect false negative)    -Will be discharged on p o  steroid, azithromycin  -incentive spirometry  -respiratory protocol  -continue home Lasix 40 mg daily   -titrate oxygen as tolerated  -Tamiflu 75 mg b i d  (suspect false negative given wife's positive status)  -fluid restriction 1500 mL  -monitor I/Os  -daily weights  -Patient is refusing BiPAP p r n  and at nighttime

## 2022-12-05 NOTE — ASSESSMENT & PLAN NOTE
· Patient had history of dementia  · Patient stated he occasional have depression due to his medical conditions  · Denies SI/HI  · Follow-up with PCP, psychiatrist referral if needed

## 2022-12-05 NOTE — ASSESSMENT & PLAN NOTE
Patient presenting with blood pressure of 212/99  Received Lasix IV 20 mg once and labetalol IV 5 mg twice  Pressure improved after utilization of BiPAP    -BP acceptable, continue home metoprolol  -Will be discharged on home medications

## 2022-12-05 NOTE — PLAN OF CARE
Problem: SAFETY ADULT  Goal: Patient will remain free of falls  Description: INTERVENTIONS:  - Educate patient/family on patient safety including physical limitations  - Instruct patient to call for assistance with activity   - Consult OT/PT to assist with strengthening/mobility   - Keep Call bell within reach  - Keep bed low and locked with side rails adjusted as appropriate  - Keep care items and personal belongings within reach  - Initiate and maintain comfort rounds  - Make Fall Risk Sign visible to staff    - Apply yellow socks and bracelet for high fall risk patients  - Consider moving patient to room near nurses station  Outcome: Progressing  Goal: Maintain or return to baseline ADL function  Description: INTERVENTIONS:  -  Assess patient's ability to carry out ADLs; assess patient's baseline for ADL function and identify physical deficits which impact ability to perform ADLs (bathing, care of mouth/teeth, toileting, grooming, dressing, etc )  - Assess/evaluate cause of self-care deficits   - Assess range of motion  - Assess patient's mobility; develop plan if impaired  - Assess patient's need for assistive devices and provide as appropriate  - Encourage maximum independence but intervene and supervise when necessary  - Involve family in performance of ADLs  - Assess for home care needs following discharge   - Consider OT consult to assist with ADL evaluation and planning for discharge  - Provide patient education as appropriate  Outcome: Progressing  Goal: Maintains/Returns to pre admission functional level  Description: INTERVENTIONS:  - Perform BMAT or MOVE assessment daily    - Set and communicate daily mobility goal to care team and patient/family/caregiver     - Collaborate with rehabilitation services on mobility goals if consulted    - Out of bed for toileting  - Record patient progress and toleration of activity level   Outcome: Progressing     Problem: DISCHARGE PLANNING  Goal: Discharge to home or other facility with appropriate resources  Description: INTERVENTIONS:  - Identify barriers to discharge w/patient and caregiver  - Arrange for needed discharge resources and transportation as appropriate  - Identify discharge learning needs (meds, wound care, etc )  - Arrange for interpretive services to assist at discharge as needed  - Refer to Case Management Department for coordinating discharge planning if the patient needs post-hospital services based on physician/advanced practitioner order or complex needs related to functional status, cognitive ability, or social support system  Outcome: Progressing     Problem: Knowledge Deficit  Goal: Patient/family/caregiver demonstrates understanding of disease process, treatment plan, medications, and discharge instructions  Description: Complete learning assessment and assess knowledge base    Interventions:  - Provide teaching at level of understanding  - Provide teaching via preferred learning methods  Outcome: Progressing     Problem: RESPIRATORY - ADULT  Goal: Achieves optimal ventilation and oxygenation  Description: INTERVENTIONS:  - Assess for changes in respiratory status  - Assess for changes in mentation and behavior  - Position to facilitate oxygenation and minimize respiratory effort  - Oxygen administered by appropriate delivery if ordered  - Initiate smoking cessation education as indicated  - Encourage broncho-pulmonary hygiene including cough, deep breathe, Incentive Spirometry  - Assess the need for suctioning and aspirate as needed  - Assess and instruct to report SOB or any respiratory difficulty  - Respiratory Therapy support as indicated  Outcome: Progressing     Problem: Prexisting or High Potential for Compromised Skin Integrity  Goal: Skin integrity is maintained or improved  Description: INTERVENTIONS:  - Identify patients at risk for skin breakdown  - Assess and monitor skin integrity  - Assess and monitor nutrition and hydration status  - Monitor labs   - Assess for incontinence   - Turn and reposition patient  - Assist with mobility/ambulation  - Relieve pressure over bony prominences  - Avoid friction and shearing  - Provide appropriate hygiene as needed including keeping skin clean and dry  - Evaluate need for skin moisturizer/barrier cream  - Collaborate with interdisciplinary team   - Patient/family teaching  - Consider wound care consult   Outcome: Progressing     Problem: Potential for Falls  Goal: Patient will remain free of falls  Description: INTERVENTIONS:  - Educate patient/family on patient safety including physical limitations  - Instruct patient to call for assistance with activity   - Consult OT/PT to assist with strengthening/mobility   - Keep Call bell within reach  - Keep bed low and locked with side rails adjusted as appropriate  - Keep care items and personal belongings within reach  - Initiate and maintain comfort rounds    - Apply yellow socks and bracelet for high fall risk patients  - Consider moving patient to room near nurses station  Outcome: Progressing     Problem: MOBILITY - ADULT  Goal: Maintain or return to baseline ADL function  Description: INTERVENTIONS:  -  Assess patient's ability to carry out ADLs; assess patient's baseline for ADL function and identify physical deficits which impact ability to perform ADLs (bathing, care of mouth/teeth, toileting, grooming, dressing, etc )  - Assess/evaluate cause of self-care deficits   - Assess range of motion  - Assess patient's mobility; develop plan if impaired  - Assess patient's need for assistive devices and provide as appropriate  - Encourage maximum independence but intervene and supervise when necessary  - Involve family in performance of ADLs  - Assess for home care needs following discharge   - Consider OT consult to assist with ADL evaluation and planning for discharge  - Provide patient education as appropriate  Outcome: Progressing  Goal: Maintains/Returns to pre admission functional level  Description: INTERVENTIONS:  - Perform BMAT or MOVE assessment daily    - Set and communicate daily mobility goal to care team and   - Out of bed for toileting  - Record patient progress and toleration of activity level   Outcome: Progressing

## 2022-12-05 NOTE — PLAN OF CARE
Problem: SAFETY ADULT  Goal: Patient will remain free of falls  Description: INTERVENTIONS:  - Educate patient/family on patient safety including physical limitations  - Instruct patient to call for assistance with activity   - Consult OT/PT to assist with strengthening/mobility   - Keep Call bell within reach  - Keep bed low and locked with side rails adjusted as appropriate  - Keep care items and personal belongings within reach  - Initiate and maintain comfort rounds  - Make Fall Risk Sign visible to staff  - Apply yellow socks and bracelet for high fall risk patients  - Consider moving patient to room near nurses station  Outcome: Progressing  Goal: Maintain or return to baseline ADL function  Description: INTERVENTIONS:  -  Assess patient's ability to carry out ADLs; assess patient's baseline for ADL function and identify physical deficits which impact ability to perform ADLs (bathing, care of mouth/teeth, toileting, grooming, dressing, etc )  - Assess/evaluate cause of self-care deficits   - Assess range of motion  - Assess patient's mobility; develop plan if impaired  - Assess patient's need for assistive devices and provide as appropriate  - Encourage maximum independence but intervene and supervise when necessary  - Involve family in performance of ADLs  - Assess for home care needs following discharge   - Consider OT consult to assist with ADL evaluation and planning for discharge  - Provide patient education as appropriate  Outcome: Progressing  Goal: Maintains/Returns to pre admission functional level  Description: INTERVENTIONS:  - Perform BMAT or MOVE assessment daily    - Set and communicate daily mobility goal to care team and patient/family/caregiver     - Collaborate with rehabilitation services on mobility goals if consulted  - Out of bed for toileting  - Record patient progress and toleration of activity level   Outcome: Progressing     Problem: DISCHARGE PLANNING  Goal: Discharge to home or other facility with appropriate resources  Description: INTERVENTIONS:  - Identify barriers to discharge w/patient and caregiver  - Arrange for needed discharge resources and transportation as appropriate  - Identify discharge learning needs (meds, wound care, etc )  - Arrange for interpretive services to assist at discharge as needed  - Refer to Case Management Department for coordinating discharge planning if the patient needs post-hospital services based on physician/advanced practitioner order or complex needs related to functional status, cognitive ability, or social support system  Outcome: Progressing     Problem: Knowledge Deficit  Goal: Patient/family/caregiver demonstrates understanding of disease process, treatment plan, medications, and discharge instructions  Description: Complete learning assessment and assess knowledge base    Interventions:  - Provide teaching at level of understanding  - Provide teaching via preferred learning methods  Outcome: Progressing     Problem: RESPIRATORY - ADULT  Goal: Achieves optimal ventilation and oxygenation  Description: INTERVENTIONS:  - Assess for changes in respiratory status  - Assess for changes in mentation and behavior  - Position to facilitate oxygenation and minimize respiratory effort  - Oxygen administered by appropriate delivery if ordered  - Initiate smoking cessation education as indicated  - Encourage broncho-pulmonary hygiene including cough, deep breathe, Incentive Spirometry  - Assess the need for suctioning and aspirate as needed  - Assess and instruct to report SOB or any respiratory difficulty  - Respiratory Therapy support as indicated  Outcome: Progressing

## 2022-12-05 NOTE — PROGRESS NOTES
[facility]  Progress Note Shellye Junes [birth year], 82 y o  male MRN: [id number]  Unit/Bed#: 7T [facility] 706-01 Encounter: [id number]  Primary Care Provider: Johana Ovalle MD   Date and time admitted to hospital: 12/4/2022  1:05 PM    * Acute on chronic respiratory failure [facility])  Assessment & Plan  Patient chronically on 2 L nasal cannula secondary to COPD  Presented to ED with shortness of breath and generalized malaise  Wife tested positive for flu  Required BiPAP in ED  ABG:  PH 7 33 pCO2 83 5, PO2 34 4 HCO3 43 7  COVID, RSV, flu negative  Procalcitonin negative  Troponins 27 -> 25  CXR showed Chronic pulmonary vascular congestion without zayda CHF  No acute findings    Improved after administration of BiPAP, now on 5 L nasal cannula    Possibly secondary to COPD exacerbations as well as flu (suspect false negative)    -taper IV steroid to 40 mg q 12  -azithromycin  -incentive spirometry  -DuTyrell, -Breo Ellipta  -respiratory protocol  -continue Lasix IV 40 mg b i d   -titrate oxygen as tolerated  -Tamiflu 75 mg b i d  (suspect false negative given wife's positive status)  -fluid restriction 1500 mL  -monitor I/Os  -daily weights  -BiPAP p r n  and at nighttime    Hypertensive urgency  Assessment & Plan  Patient presenting with blood pressure of 212/99  Received Lasix IV 20 mg once and labetalol IV 5 mg twice  Pressure improved after utilization of BiPAP    -BP acceptable, continue home metoprolol  -Lasix 40 mg IV b i d   -labetalol 20 mg IV q 4 hours for systolic blood pressure greater than 180  -will add more medications as necessary    Dementia in other diseases classified elsewhere, unspecified severity, without behavioral disturbance, psychotic disturbance, mood disturbance, and anxiety ([facility] [address] )  Assessment & Plan  · Patient had history of dementia  · Patient stated he occasional have depression due to his medical conditions  · Denies SI/HI  · Follow-up with PCP, psychiatrist referral if needed  Chronic diastolic CHF (congestive heart failure) (HCC)  Assessment & Plan  Wt Readings from Last 3 Encounters:   22 55 5 kg (122 lb 5 7 oz)   10/13/22 62 1 kg (137 lb)   10/11/22 62 1 kg (137 lb)       Echocardiogram from  revealed EF 73% pain grade 1 diastolic dysfunction  Patient takes Lasix 20 mg p o  Daily home    -plan as above          Chronic obstructive pulmonary disease with acute exacerbation (HCC)  Assessment & Plan  Patient on 2 L nasal cannula chronically  Albuterol and Spiriva at home    -currently on 3 L oxygen with SpO2 90%,  -Will taper IV steroid to 40 mg q 12h  -azithromycin  -incentive spirometry  -DuoNebs  -respiratory protocol    VTE Pharmacologic Prophylaxis:   Pharmacologic: Enoxaparin (Lovenox)  Mechanical VTE Prophylaxis in Place: Yes    Patient Centered Rounds: I have performed bedside rounds with nursing staff today  Discussions with Specialists or Other Care Team Provider:  Discussed with Case Management, nurse, respiratory therapist    Education and Discussions with Family / Patient:  Called the son  No answer  Left a voicemail  Time Spent for Care: 45 minutes  More than 50% of total time spent on counseling and coordination of care as described above  Current Length of Stay: 1 day(s)    Current Patient Status: Inpatient   Certification Statement: The patient will continue to require additional inpatient hospital stay due to Acute hypoxic respiratory failure most likely due to COPD exacerbation    Discharge Plan / Estimated Discharge Date:  Pending      Code Status: Level 3 - DNAR and DNI      Subjective: Information was obtained by using [employer] [id number]  Patient was seen and examined at bedside  The patient stated he is doing better in general   He denies fever, chills, cough, trouble breathing      Objective:     Vitals:   Temp (24hrs), Av °F (36 7 °C), Min:97 3 °F (36 3 °C), Max:98 9 °F (37 2 °C)    Temp:  [97 3 °F (36 3 °C)-98 9 °F (37 2 °C)] 97 3 °F (36 3 °C)  HR:  [63-88] 65  Resp:  [18-22] 19  BP: (137-223)/() 157/87  SpO2:  [36 %-97 %] 94 %  Body mass index is 19 16 kg/m²  Input and Output Summary (last 24 hours): Intake/Output Summary (Last 24 hours) at 12/5/2022 1154  Last data filed at 12/5/2022 0900  Gross per 24 hour   Intake 128 ml   Output 750 ml   Net -622 ml       Physical Exam:     Physical Exam  General: breathing well on room air, no acute distress  HEENT: NC/AT, PERRL, EOM - normal  Neck: Supple  Pulm/Chest: Normal chest wall expansion, decreased breath sounds on both side, no wheezing/rhonchi or crackles appreciated  CVS: RRR, normal S1&S2, no murmur appreciated, capillary refill <2s  Abd: soft, non tender, non distended, bowel sounds +  MSK: move all 4 extremities spontaneously  Skin: warm  CNS: no acute focal neuro deficit      Additional Data:     Labs:    Results from last 7 days   Lab Units 12/05/22  0443   WBC Thousand/uL 5 44   HEMOGLOBIN g/dL 14 7   HEMATOCRIT % 51 7*   PLATELETS Thousands/uL 129*   NEUTROS PCT % 89*   LYMPHS PCT % 7*   MONOS PCT % 3*   EOS PCT % 0     Results from last 7 days   Lab Units 12/05/22  0443   POTASSIUM mmol/L 4 0   CHLORIDE mmol/L 93*   CO2 mmol/L 40*   BUN mg/dL 28*   CREATININE mg/dL 1 10   CALCIUM mg/dL 8 9   ALK PHOS U/L 78   ALT U/L 19   AST U/L 33     Results from last 7 days   Lab Units 12/04/22  1329   INR  1 11       * I Have Reviewed All Lab Data Listed Above  * Additional Pertinent Lab Tests Reviewed: [place] Admission Reviewed    Imaging:      I have reviewed pertinent imaging  Recent Cultures (last 7 days):     Results from last 7 days   Lab Units 12/04/22  1329   BLOOD CULTURE  Received in Microbiology Lab  Culture in Progress  Received in Microbiology Lab  Culture in Progress         Last 24 Hours Medication List:   Current Facility-Administered Medications   Medication Dose Route Frequency Provider Last Rate   • albuterol  2 puff Inhalation Q4H PRN Eneida Toshia, DO     • azithromycin  500 mg Oral Q24H Eneida Toshia, DO     • enoxaparin  40 mg Subcutaneous Daily Eneida Toshia, DO     • Fluticasone Furoate-Vilanterol  1 puff Inhalation Daily Eneida Toshia, DO     • furosemide  40 mg Intravenous BID (diuretic) Eneida Toshia, DO     • levalbuterol  1 25 mg Nebulization TID Eneida Toshia, DO      And   • ipratropium  0 5 mg Nebulization TID Eneida Toshia, DO     • ipratropium-albuterol  3 mL Nebulization Q6H PRN Eneida Toshia, DO     • labetalol  20 mg Intravenous Q4H PRN Eneida Toshia, DO     • methylPREDNISolone sodium succinate  40 mg Intravenous Q12H [facility] Reymundo Noriega MD     • metoprolol tartrate  50 mg Oral Q12H Eneida Toshia, DO     • oseltamivir  75 mg Oral Q12H [facility] Eneida Toshia, DO     • pantoprazole  40 mg Oral Early Morning Eneida Toshia, DO          Today, Patient Was Seen By: Reymundo Noriega MD    ** Please Note: Dragon 360 Dictation voice to text software may have been used in the creation of this document   **

## 2022-12-05 NOTE — MALNUTRITION/BMI
This medical record reflects one or more clinical indicators suggestive of malnutrition and/or morbid obesity  Malnutrition Findings:   Adult Malnutrition type: Chronic illness  Adult Degree of Malnutrition: Other severe protein calorie malnutrition  Malnutrition Characteristics: Inadequate energy, Weight loss                  360 Statement: Sever pro/kandi malnutrition r/t catabolic illness as evidenced by 9% unplanned weight loss since 9/6/22, consuming < 75% energy intake compared to estimated energy needs > 1 month  treated with Ensure Compact bid    BMI Findings: Body mass index is 19 16 kg/m²  See Nutrition note dated 12/5/22 for additional details  Completed nutrition assessment is viewable in the nutrition documentation

## 2022-12-06 VITALS
SYSTOLIC BLOOD PRESSURE: 141 MMHG | TEMPERATURE: 97.4 F | HEART RATE: 64 BPM | DIASTOLIC BLOOD PRESSURE: 77 MMHG | WEIGHT: 122.36 LBS | RESPIRATION RATE: 19 BRPM | OXYGEN SATURATION: 93 % | HEIGHT: 67 IN | BODY MASS INDEX: 19.2 KG/M2

## 2022-12-06 PROBLEM — E43 SEVERE PROTEIN-CALORIE MALNUTRITION (HCC): Status: ACTIVE | Noted: 2022-12-06

## 2022-12-06 LAB
ANION GAP SERPL CALCULATED.3IONS-SCNC: 6 MMOL/L (ref 5–14)
BASOPHILS # BLD AUTO: 0.01 THOUSANDS/ÂΜL (ref 0–0.1)
BASOPHILS NFR BLD AUTO: 0 % (ref 0–1)
BUN SERPL-MCNC: 42 MG/DL (ref 5–25)
CALCIUM SERPL-MCNC: 8.7 MG/DL (ref 8.4–10.2)
CHLORIDE SERPL-SCNC: 91 MMOL/L (ref 96–108)
CO2 SERPL-SCNC: 45 MMOL/L (ref 21–32)
CREAT SERPL-MCNC: 1.35 MG/DL (ref 0.7–1.5)
EOSINOPHIL # BLD AUTO: 0 THOUSAND/ÂΜL (ref 0–0.61)
EOSINOPHIL NFR BLD AUTO: 0 % (ref 0–6)
ERYTHROCYTE [DISTWIDTH] IN BLOOD BY AUTOMATED COUNT: 13.8 % (ref 11.6–15.1)
GFR SERPL CREATININE-BSD FRML MDRD: 47 ML/MIN/1.73SQ M
GLUCOSE SERPL-MCNC: 180 MG/DL (ref 70–99)
HCT VFR BLD AUTO: 47 % (ref 36.5–49.3)
HGB BLD-MCNC: 14 G/DL (ref 12–17)
IMM GRANULOCYTES # BLD AUTO: 0.03 THOUSAND/UL (ref 0–0.2)
IMM GRANULOCYTES NFR BLD AUTO: 0 % (ref 0–2)
LYMPHOCYTES # BLD AUTO: 0.62 THOUSANDS/ÂΜL (ref 0.6–4.47)
LYMPHOCYTES NFR BLD AUTO: 7 % (ref 14–44)
MCH RBC QN AUTO: 31.1 PG (ref 26.8–34.3)
MCHC RBC AUTO-ENTMCNC: 29.8 G/DL (ref 31.4–37.4)
MCV RBC AUTO: 104 FL (ref 82–98)
MONOCYTES # BLD AUTO: 0.77 THOUSAND/ÂΜL (ref 0.17–1.22)
MONOCYTES NFR BLD AUTO: 8 % (ref 4–12)
NEUTROPHILS # BLD AUTO: 7.71 THOUSANDS/ÂΜL (ref 1.85–7.62)
NEUTS SEG NFR BLD AUTO: 85 % (ref 43–75)
NRBC BLD AUTO-RTO: 0 /100 WBCS
PLATELET # BLD AUTO: 179 THOUSANDS/UL (ref 149–390)
PMV BLD AUTO: 9.8 FL (ref 8.9–12.7)
POTASSIUM SERPL-SCNC: 3.8 MMOL/L (ref 3.5–5.3)
RBC # BLD AUTO: 4.5 MILLION/UL (ref 3.88–5.62)
SODIUM SERPL-SCNC: 142 MMOL/L (ref 135–147)
WBC # BLD AUTO: 9.14 THOUSAND/UL (ref 4.31–10.16)

## 2022-12-06 RX ORDER — PREDNISONE 10 MG/1
40 TABLET ORAL DAILY
Qty: 20 TABLET | Refills: 0 | Status: SHIPPED | OUTPATIENT
Start: 2022-12-06 | End: 2022-12-09 | Stop reason: SDUPTHER

## 2022-12-06 RX ORDER — OSELTAMIVIR PHOSPHATE 75 MG/1
75 CAPSULE ORAL EVERY 12 HOURS SCHEDULED
Qty: 6 CAPSULE | Refills: 0 | Status: SHIPPED | OUTPATIENT
Start: 2022-12-06 | End: 2022-12-09 | Stop reason: ALTCHOICE

## 2022-12-06 RX ADMIN — LEVALBUTEROL HYDROCHLORIDE 1.25 MG: 1.25 SOLUTION, CONCENTRATE RESPIRATORY (INHALATION) at 08:45

## 2022-12-06 RX ADMIN — METHYLPREDNISOLONE SODIUM SUCCINATE 40 MG: 40 INJECTION, POWDER, FOR SOLUTION INTRAMUSCULAR; INTRAVENOUS at 08:31

## 2022-12-06 RX ADMIN — FUROSEMIDE 40 MG: 10 INJECTION, SOLUTION INTRAVENOUS at 08:32

## 2022-12-06 RX ADMIN — OSELTAMIVIR 75 MG: 75 CAPSULE ORAL at 08:31

## 2022-12-06 RX ADMIN — ENOXAPARIN SODIUM 40 MG: 100 INJECTION SUBCUTANEOUS at 08:31

## 2022-12-06 RX ADMIN — IPRATROPIUM BROMIDE 0.5 MG: 0.5 SOLUTION RESPIRATORY (INHALATION) at 08:45

## 2022-12-06 RX ADMIN — FLUTICASONE FUROATE AND VILANTEROL TRIFENATATE 1 PUFF: 100; 25 POWDER RESPIRATORY (INHALATION) at 08:33

## 2022-12-06 RX ADMIN — PANTOPRAZOLE SODIUM 40 MG: 40 TABLET, DELAYED RELEASE ORAL at 08:31

## 2022-12-06 RX ADMIN — METOPROLOL TARTRATE 50 MG: 50 TABLET, FILM COATED ORAL at 08:31

## 2022-12-06 NOTE — DISCHARGE INSTRUCTIONS
Discharge instructions from hospitalist  1  Follow-up with your primary care physician in 1 week in regards to recent hospitalization  2  Take medications regularly    Changes in medications    Take Tamiflu 75 mg twice a day for 6 doses  Take prednisone 40 mg daily for 5 days  3  Come back to the ER if symptoms recur or worsen  4  Activity as tolerated  5   Diet :  Heart healthy diet; information packet has more detailed information

## 2022-12-06 NOTE — PLAN OF CARE
Problem: Discharge Planning  Goal: Discharge to home or other facility with appropriate resources  6/27/2022 0648 by Enzo Grimes RN  Outcome: Progressing  6/26/2022 1857 by Diogenes Guerin RN  Outcome: Progressing     Problem: ABCDS Injury Assessment  Goal: Absence of physical injury  6/27/2022 0648 by Enzo Grimes RN  Outcome: Progressing  6/26/2022 1857 by Diogenes Guerin RN  Outcome: Progressing     Problem: Pain  Goal: Verbalizes/displays adequate comfort level or baseline comfort level  Outcome: Progressing Problem: SAFETY ADULT  Goal: Patient will remain free of falls  Description: INTERVENTIONS:  - Educate patient/family on patient safety including physical limitations  - Instruct patient to call for assistance with activity   - Consult OT/PT to assist with strengthening/mobility   - Keep Call bell within reach  - Keep bed low and locked with side rails adjusted as appropriate  - Keep care items and personal belongings within reach  - Initiate and maintain comfort rounds  - Make Fall Risk Sign visible to staff  - Offer Toileting every  Hours, in advance of need  - Initiate/Maintain alarm  - Obtain necessary fall risk management equipment:   - Apply yellow socks and bracelet for high fall risk patients  - Consider moving patient to room near nurses station  Outcome: Progressing     Problem: RESPIRATORY - ADULT  Goal: Achieves optimal ventilation and oxygenation  Description: INTERVENTIONS:  - Assess for changes in respiratory status  - Assess for changes in mentation and behavior  - Position to facilitate oxygenation and minimize respiratory effort  - Oxygen administered by appropriate delivery if ordered  - Initiate smoking cessation education as indicated  - Encourage broncho-pulmonary hygiene including cough, deep breathe, Incentive Spirometry  - Assess the need for suctioning and aspirate as needed  - Assess and instruct to report SOB or any respiratory difficulty  - Respiratory Therapy support as indicated  Outcome: Progressing     Problem: MOBILITY - ADULT  Goal: Maintain or return to baseline ADL function  Description: INTERVENTIONS:  -  Assess patient's ability to carry out ADLs; assess patient's baseline for ADL function and identify physical deficits which impact ability to perform ADLs (bathing, care of mouth/teeth, toileting, grooming, dressing, etc )  - Assess/evaluate cause of self-care deficits   - Assess range of motion  - Assess patient's mobility; develop plan if impaired  - Assess patient's need for assistive devices and provide as appropriate  - Encourage maximum independence but intervene and supervise when necessary  - Involve family in performance of ADLs  - Assess for home care needs following discharge   - Consider OT consult to assist with ADL evaluation and planning for discharge  - Provide patient education as appropriate  Outcome: Progressing

## 2022-12-06 NOTE — DISCHARGE SUMMARY
[facility]  Discharge- Liam San [birth year], 82 y o  male MRN: [id number]  Unit/Bed#: 7T [facility] 706-01 Encounter: [id number]  Primary Care Provider: Yael Concepcion MD   Date and time admitted to hospital: 12/4/2022  1:05 PM    * Acute on chronic respiratory failure [facility])  Assessment & Plan  Patient chronically on 2 L nasal cannula secondary to COPD  Presented to ED with shortness of breath and generalized malaise  Wife tested positive for flu  Required BiPAP in ED  ABG:  PH 7 33 pCO2 83 5, PO2 34 4 HCO3 43 7  COVID, RSV, flu negative  Procalcitonin negative  Troponins 27 -> 25  CXR showed Chronic pulmonary vascular congestion without zayda CHF  No acute findings  Improved after administration of BiPAP, now on 5 L nasal cannula    Possibly secondary to COPD exacerbations as well as flu (suspect false negative)    -Will be discharged on p o  steroid, azithromycin  -incentive spirometry  -respiratory protocol  -continue home Lasix 40 mg daily   -titrate oxygen as tolerated  -Tamiflu 75 mg b i d  (suspect false negative given wife's positive status)  -fluid restriction 1500 mL  -monitor I/Os  -daily weights  -Patient is refusing BiPAP p r n  and at nighttime    Severe protein-calorie malnutrition ([address] )  Assessment & Plan  Malnutrition Findings:   Adult Malnutrition type: Chronic illness  Adult Degree of Malnutrition: Other severe protein calorie malnutrition  Malnutrition Characteristics: Inadequate energy, Weight loss                  360 Statement: Sever pro/kandi malnutrition r/t catabolic illness as evidenced by 9% unplanned weight loss since 9/6/22, consuming < 75% energy intake compared to estimated energy needs > 1 month  treated with Ensure Compact bid    BMI Findings: Body mass index is 19 16 kg/m²  Nutritionist on board    Continue Ensure supplement twice daily      Hypertensive urgency  Assessment & Plan  Patient presenting with blood pressure of REQUESTING REFILL    212/99  Received Lasix IV 20 mg once and labetalol IV 5 mg twice  Pressure improved after utilization of BiPAP    -BP acceptable, continue home metoprolol  -Will be discharged on home medications      Dementia in other diseases classified elsewhere, unspecified severity, without behavioral disturbance, psychotic disturbance, mood disturbance, and anxiety ([facility] [address] )  Assessment & Plan  · Patient had history of dementia  · Patient stated he occasional have depression due to his medical conditions  · Denies SI/HI  · Follow-up with PCP, psychiatrist referral if needed  Chronic diastolic CHF (congestive heart failure) ([facility])  Assessment & Plan  Wt Readings from Last 3 Encounters:   12/05/22 55 5 kg (122 lb 5 7 oz)   10/13/22 62 1 kg (137 lb)   10/11/22 62 1 kg (137 lb)       Echocardiogram from 03/22 revealed EF 73% pain grade 1 diastolic dysfunction  Patient takes Lasix 20 mg p o  Daily home    -plan as above          Chronic obstructive pulmonary disease with acute exacerbation (HCC)  Assessment & Plan  Patient on 2 L nasal cannula chronically  Albuterol and Spiriva at home    -Patient refused using BiPAP last night and stated he does not want to use BiPAP anymore     -Currently on 3 L oxygen with SpO2 93%,  -Will be discharged on p o  steroids  -azithromycin  -incentive spirometry  -DuoNebs  -respiratory protocol      Discharging Physician / Practitioner: Gaudencio Baker MD  PCP: Daya Mccall MD  Admission Date:   Admission Orders (From admission, onward)     Ordered        12/04/22 1708  INPATIENT ADMISSION  Once                      Discharge Date: 12/06/22    Medical Problems     Resolved Problems  Date Reviewed: 12/6/2022   None         Consultations During Hospital Stay:  · None    Procedures Performed:   · None    Significant Findings / Test Results:   XR chest portable Result Date: 12/4/2022  · Impression: Chronic pulmonary vascular congestion without zayda CHF  No acute findings   Workstation performed: [id number] Incidental Findings:   · None     Test Results Pending at Discharge (will require follow up): · None     Outpatient Tests Requested:  · None    Complications:  None    Reason for Admission: Shortness of breath  Hospital Course:     Jessica Abebe is a 82 y o  male patient with PMH of diastolic CHF, COPD chronically on 2 L oxygen, CAD, BPH and GERD who originally presented to the hospital on 12/4/2022 due to shortness of breath  Please refer to H&P on 12/4 for details  Patient was found to have hypercapnia and his clinical symptoms improved with BiPAP use  During hospitalization, patient refused using BiPAP at nighttime  Patient was started nebulized treatment and IV steroids with clinical improvement  Chest x-ray showed chronic pulmonary congestion and no acute CHF  He had exposure to flu infection from wife therefore he was started on Tamiflu  Today, patient stated he would like to go home  Patient keeps refusing to use BiPAP  Patient is recommended to follow-up with PCP in 1 week  Patient is clinically and hemodynamically stable to be discharged today  Please see above list of diagnoses and related plan for additional information  Condition at Discharge: stable     Discharge Day Visit / Exam:     Subjective:  Patient was seen and examined at bedside  The patient denies any fever, chills, chest pain, palpitation, shortness of breath, N/V, abd pain      Vitals: Blood Pressure: 141/77 (12/06/22 0756)  Pulse: 64 (12/06/22 0845)  Temperature: (!) 97 4 °F (36 3 °C) (12/06/22 0756)  Temp Source: Temporal (12/06/22 0756)  Respirations: 19 (12/06/22 0845)  Height: 5' 7" (170 2 cm) (12/04/22 1955)  Weight - Scale: 55 5 kg (122 lb 5 7 oz) (12/05/22 0540)  SpO2: 93 % (12/06/22 0845)  Exam:   Physical Exam  General: breathing well on 4 L oxygen via NC, no acute distress  HEENT: NC/AT, PERRL, EOM - normal  Neck: Supple  Pulm/Chest: Normal chest wall expansion, decreased breath sounds on both side, no wheezing/rhonchi or crackles appreciated  CVS: RRR, normal S1&S2, no murmur appreciated, capillary refill <2s  Abd: soft, non tender, non distended, bowel sounds +  MSK: move all 4 extremities spontaneously  Skin: warm  CNS: no acute focal neuro deficit    Discussion with Family: Discussed with wife    Discharge instructions/Information to patient and family:   See after visit summary for information provided to patient and family  Provisions for Follow-Up Care:  See after visit summary for information related to follow-up care and any pertinent home health orders  Disposition:     Home    For Discharges to [facility] SNF:   · Not Applicable to this Patient - Not Applicable to this Patient    Planned Readmission: No     Discharge Statement:  I spent 45 minutes discharging the patient  This time was spent on the day of discharge  I had direct contact with the patient on the day of discharge  Greater than 50% of the total time was spent examining patient, answering all patient questions, arranging and discussing plan of care with patient as well as directly providing post-discharge instructions  Additional time then spent on discharge activities  Discharge Medications:  See after visit summary for reconciled discharge medications provided to patient and family        ** Please Note: This note has been constructed using a voice recognition system **

## 2022-12-06 NOTE — PLAN OF CARE
Problem: SAFETY ADULT  Goal: Patient will remain free of falls  Description: INTERVENTIONS:  - Educate patient/family on patient safety including physical limitations  - Instruct patient to call for assistance with activity   - Consult OT/PT to assist with strengthening/mobility   - Keep Call bell within reach  - Keep bed low and locked with side rails adjusted as appropriate  - Keep care items and personal belongings within reach  - Initiate and maintain comfort rounds  - Make Fall Risk Sign visible to staff    - Apply yellow socks and bracelet for high fall risk patients  - Consider moving patient to room near nurses station  Outcome: Progressing  Goal: Maintain or return to baseline ADL function  Description: INTERVENTIONS:  -  Assess patient's ability to carry out ADLs; assess patient's baseline for ADL function and identify physical deficits which impact ability to perform ADLs (bathing, care of mouth/teeth, toileting, grooming, dressing, etc )  - Assess/evaluate cause of self-care deficits   - Assess range of motion  - Assess patient's mobility; develop plan if impaired  - Assess patient's need for assistive devices and provide as appropriate  - Encourage maximum independence but intervene and supervise when necessary  - Involve family in performance of ADLs  - Assess for home care needs following discharge   - Consider OT consult to assist with ADL evaluation and planning for discharge  - Provide patient education as appropriate  Outcome: Progressing  Goal: Maintains/Returns to pre admission functional level  Description: INTERVENTIONS:  - Perform BMAT or MOVE assessment daily    - Set and communicate daily mobility goal to care team and patient/family/caregiver     - Collaborate with rehabilitation services on mobility goals if consulted    - Out of bed for toileting  - Record patient progress and toleration of activity level   Outcome: Progressing     Problem: DISCHARGE PLANNING  Goal: Discharge to home or other facility with appropriate resources  Description: INTERVENTIONS:  - Identify barriers to discharge w/patient and caregiver  - Arrange for needed discharge resources and transportation as appropriate  - Identify discharge learning needs (meds, wound care, etc )  - Arrange for interpretive services to assist at discharge as needed  - Refer to Case Management Department for coordinating discharge planning if the patient needs post-hospital services based on physician/advanced practitioner order or complex needs related to functional status, cognitive ability, or social support system  Outcome: Progressing     Problem: Knowledge Deficit  Goal: Patient/family/caregiver demonstrates understanding of disease process, treatment plan, medications, and discharge instructions  Description: Complete learning assessment and assess knowledge base    Interventions:  - Provide teaching at level of understanding  - Provide teaching via preferred learning methods  Outcome: Progressing     Problem: RESPIRATORY - ADULT  Goal: Achieves optimal ventilation and oxygenation  Description: INTERVENTIONS:  - Assess for changes in respiratory status  - Assess for changes in mentation and behavior  - Position to facilitate oxygenation and minimize respiratory effort  - Oxygen administered by appropriate delivery if ordered  - Initiate smoking cessation education as indicated  - Encourage broncho-pulmonary hygiene including cough, deep breathe, Incentive Spirometry  - Assess the need for suctioning and aspirate as needed  - Assess and instruct to report SOB or any respiratory difficulty  - Respiratory Therapy support as indicated  Outcome: Progressing     Problem: Prexisting or High Potential for Compromised Skin Integrity  Goal: Skin integrity is maintained or improved  Description: INTERVENTIONS:  - Identify patients at risk for skin breakdown  - Assess and monitor skin integrity  - Assess and monitor nutrition and hydration status  - Monitor labs   - Assess for incontinence   - Turn and reposition patient  - Assist with mobility/ambulation  - Relieve pressure over bony prominences  - Avoid friction and shearing  - Provide appropriate hygiene as needed including keeping skin clean and dry  - Evaluate need for skin moisturizer/barrier cream  - Collaborate with interdisciplinary team   - Patient/family teaching  - Consider wound care consult   Outcome: Progressing     Problem: Potential for Falls  Goal: Patient will remain free of falls  Description: INTERVENTIONS:  - Educate patient/family on patient safety including physical limitations  - Instruct patient to call for assistance with activity   - Consult OT/PT to assist with strengthening/mobility   - Keep Call bell within reach  - Keep bed low and locked with side rails adjusted as appropriate  - Keep care items and personal belongings within reach  - Initiate and maintain comfort rounds  - Make Fall Risk Sign visible to staff    - Consider moving patient to room near nurses station  Outcome: Progressing     Problem: MOBILITY - ADULT  Goal: Maintain or return to baseline ADL function  Description: INTERVENTIONS:  -  Assess patient's ability to carry out ADLs; assess patient's baseline for ADL function and identify physical deficits which impact ability to perform ADLs (bathing, care of mouth/teeth, toileting, grooming, dressing, etc )  - Assess/evaluate cause of self-care deficits   - Assess range of motion  - Assess patient's mobility; develop plan if impaired  - Assess patient's need for assistive devices and provide as appropriate  - Encourage maximum independence but intervene and supervise when necessary  - Involve family in performance of ADLs  - Assess for home care needs following discharge   - Consider OT consult to assist with ADL evaluation and planning for discharge  - Provide patient education as appropriate  Outcome: Progressing  Goal: Maintains/Returns to pre admission functional level  Description: INTERVENTIONS:  - Perform BMAT or MOVE assessment daily    - Set and communicate daily mobility goal to care team and patient/family/caregiver  - Record patient progress and toleration of activity level   Outcome: Progressing     Problem: Nutrition/Hydration-ADULT  Goal: Nutrient/Hydration intake appropriate for improving, restoring or maintaining nutritional needs  Description: Monitor and assess patient's nutrition/hydration status for malnutrition  Collaborate with interdisciplinary team and initiate plan and interventions as ordered  Monitor patient's weight and dietary intake as ordered or per policy  Utilize nutrition screening tool and intervene as necessary  Determine patient's food preferences and provide high-protein, high-caloric foods as appropriate       INTERVENTIONS:  - Monitor oral intake, urinary output, labs, and treatment plans  - Assess nutrition and hydration status and recommend course of action  - Evaluate amount of meals eaten  - Assist patient with eating if necessary   - Allow adequate time for meals  - Recommend/ encourage appropriate diets, oral nutritional supplements, and vitamin/mineral supplements  - Order, calculate, and assess calorie counts as needed  - Recommend, monitor, and adjust tube feedings and TPN/PPN based on assessed needs  - Assess need for intravenous fluids  - Provide specific nutrition/hydration education as appropriate  - Include patient/family/caregiver in decisions related to nutrition  Outcome: Progressing

## 2022-12-06 NOTE — PLAN OF CARE
Problem: SAFETY ADULT  Goal: Patient will remain free of falls  Description: INTERVENTIONS:  - Educate patient/family on patient safety including physical limitations  - Instruct patient to call for assistance with activity   - Consult OT/PT to assist with strengthening/mobility   - Keep Call bell within reach  - Keep bed low and locked with side rails adjusted as appropriate  - Keep care items and personal belongings within reach  - Initiate and maintain comfort rounds      - Consider moving patient to room near nurses station  12/6/2022 1117 by Spencer Jimenez RN  Outcome: Completed  12/6/2022 1047 by Spencer Jimenez RN  Outcome: Progressing  Goal: Maintain or return to baseline ADL function  Description: INTERVENTIONS:  -  Assess patient's ability to carry out ADLs; assess patient's baseline for ADL function and identify physical deficits which impact ability to perform ADLs (bathing, care of mouth/teeth, toileting, grooming, dressing, etc )  - Assess/evaluate cause of self-care deficits   - Assess range of motion  - Assess patient's mobility; develop plan if impaired  - Assess patient's need for assistive devices and provide as appropriate  - Encourage maximum independence but intervene and supervise when necessary  - Involve family in performance of ADLs  - Assess for home care needs following discharge   - Consider OT consult to assist with ADL evaluation and planning for discharge  - Provide patient education as appropriate  12/6/2022 1117 by Spencer Jimenez RN  Outcome: Completed  12/6/2022 1047 by Spencer Jimenez RN  Outcome: Progressing  Goal: Maintains/Returns to pre admission functional level  Description: INTERVENTIONS:  - Perform BMAT or MOVE assessment daily    - Set and communicate daily mobility goal to care team and patient/family/caregiver         - Out of bed for toileting  - Record patient progress and toleration of activity level   12/6/2022 1117 by Spencer Jimenez RN  Outcome: Completed  12/6/2022 1047 by Anne Marie Arango RN  Outcome: Progressing     Problem: DISCHARGE PLANNING  Goal: Discharge to home or other facility with appropriate resources  Description: INTERVENTIONS:  - Identify barriers to discharge w/patient and caregiver  - Arrange for needed discharge resources and transportation as appropriate  - Identify discharge learning needs (meds, wound care, etc )  - Arrange for interpretive services to assist at discharge as needed  - Refer to Case Management Department for coordinating discharge planning if the patient needs post-hospital services based on physician/advanced practitioner order or complex needs related to functional status, cognitive ability, or social support system  12/6/2022 1117 by Anne Marie Arango RN  Outcome: Completed  12/6/2022 1047 by Anne Marie Arango RN  Outcome: Progressing     Problem: Knowledge Deficit  Goal: Patient/family/caregiver demonstrates understanding of disease process, treatment plan, medications, and discharge instructions  Description: Complete learning assessment and assess knowledge base    Interventions:  - Provide teaching at level of understanding  - Provide teaching via preferred learning methods  12/6/2022 1117 by Anne Marie Arango RN  Outcome: Completed  12/6/2022 1047 by Anne Marie Arango RN  Outcome: Progressing     Problem: RESPIRATORY - ADULT  Goal: Achieves optimal ventilation and oxygenation  Description: INTERVENTIONS:  - Assess for changes in respiratory status  - Assess for changes in mentation and behavior  - Position to facilitate oxygenation and minimize respiratory effort  - Oxygen administered by appropriate delivery if ordered  - Initiate smoking cessation education as indicated  - Encourage broncho-pulmonary hygiene including cough, deep breathe, Incentive Spirometry  - Assess the need for suctioning and aspirate as needed  - Assess and instruct to report SOB or any respiratory difficulty  - Respiratory Therapy support as indicated  12/6/2022 1117 by Lane Signs, RN  Outcome: Completed  12/6/2022 1047 by Lane Olmstead RN  Outcome: Progressing     Problem: Prexisting or High Potential for Compromised Skin Integrity  Goal: Skin integrity is maintained or improved  Description: INTERVENTIONS:  - Identify patients at risk for skin breakdown  - Assess and monitor skin integrity  - Assess and monitor nutrition and hydration status  - Monitor labs   - Assess for incontinence   - Turn and reposition patient  - Assist with mobility/ambulation  - Relieve pressure over bony prominences  - Avoid friction and shearing  - Provide appropriate hygiene as needed including keeping skin clean and dry  - Evaluate need for skin moisturizer/barrier cream  - Collaborate with interdisciplinary team   - Patient/family teaching  - Consider wound care consult   12/6/2022 1117 by Lane Olmstead RN  Outcome: Completed  12/6/2022 1047 by Lane Olmstead RN  Outcome: Progressing     Problem: Potential for Falls  Goal: Patient will remain free of falls  Description: INTERVENTIONS:  - Educate patient/family on patient safety including physical limitations  - Instruct patient to call for assistance with activity   - Consult OT/PT to assist with strengthening/mobility   - Keep Call bell within reach  - Keep bed low and locked with side rails adjusted as appropriate  - Keep care items and personal belongings within reach  - Initiate and maintain comfort rounds  - Make Fall Risk Sign visible to staff    - Apply yellow socks and bracelet for high fall risk patients  - Consider moving patient to room near nurses station  12/6/2022 1117 by Lane Olmstead RN  Outcome: Completed  12/6/2022 1047 by Lane Olmstead RN  Outcome: Progressing     Problem: MOBILITY - ADULT  Goal: Maintain or return to baseline ADL function  Description: INTERVENTIONS:  -  Assess patient's ability to carry out ADLs; assess patient's baseline for ADL function and identify physical deficits which impact ability to perform ADLs (bathing, care of mouth/teeth, toileting, grooming, dressing, etc )  - Assess/evaluate cause of self-care deficits   - Assess range of motion  - Assess patient's mobility; develop plan if impaired  - Assess patient's need for assistive devices and provide as appropriate  - Encourage maximum independence but intervene and supervise when necessary  - Involve family in performance of ADLs  - Assess for home care needs following discharge   - Consider OT consult to assist with ADL evaluation and planning for discharge  - Provide patient education as appropriate  12/6/2022 1117 by Juan R Escobedo RN  Outcome: Completed  12/6/2022 1047 by Juan R Escobedo RN  Outcome: Progressing  Goal: Maintains/Returns to pre admission functional level  Description: INTERVENTIONS:  - Perform BMAT or MOVE assessment daily    - Set and communicate daily mobility goal to care team and patient/family/caregiver  - Collaborate with rehabilitation services on mobility goals if consulted    - Out of bed for toileting  - Record patient progress and toleration of activity level   12/6/2022 1117 by Juan R Escobedo RN  Outcome: Completed  12/6/2022 1047 by Juan R Escobedo RN  Outcome: Progressing     Problem: Nutrition/Hydration-ADULT  Goal: Nutrient/Hydration intake appropriate for improving, restoring or maintaining nutritional needs  Description: Monitor and assess patient's nutrition/hydration status for malnutrition  Collaborate with interdisciplinary team and initiate plan and interventions as ordered  Monitor patient's weight and dietary intake as ordered or per policy  Utilize nutrition screening tool and intervene as necessary  Determine patient's food preferences and provide high-protein, high-caloric foods as appropriate       INTERVENTIONS:  - Monitor oral intake, urinary output, labs, and treatment plans  - Assess nutrition and hydration status and recommend course of action  - Evaluate amount of meals eaten  - Assist patient with eating if necessary   - Allow adequate time for meals  - Recommend/ encourage appropriate diets, oral nutritional supplements, and vitamin/mineral supplements  - Order, calculate, and assess calorie counts as needed  - Recommend, monitor, and adjust tube feedings and TPN/PPN based on assessed needs  - Assess need for intravenous fluids  - Provide specific nutrition/hydration education as appropriate  - Include patient/family/caregiver in decisions related to nutrition  12/6/2022 1117 by Claus Rinaldi RN  Outcome: Completed  12/6/2022 1047 by Claus Rinaldi RN  Outcome: Progressing

## 2022-12-06 NOTE — NURSING NOTE
Pt discharged to home after instructions given on home medication as well as follow up, instructed on heart healthy diet and the importance of monitoring daily weight, wife states they have a scale at home   Daughter and wife state understanding,  Home with oxygen, no distress on discharge

## 2022-12-06 NOTE — ASSESSMENT & PLAN NOTE
Malnutrition Findings:   Adult Malnutrition type: Chronic illness  Adult Degree of Malnutrition: Other severe protein calorie malnutrition  Malnutrition Characteristics: Inadequate energy, Weight loss                  360 Statement: Sever pro/kandi malnutrition r/t catabolic illness as evidenced by 9% unplanned weight loss since 9/6/22, consuming < 75% energy intake compared to estimated energy needs > 1 month  treated with Ensure Compact bid    BMI Findings: Body mass index is 19 16 kg/m²  Nutritionist on board    Continue Ensure supplement twice daily

## 2022-12-07 ENCOUNTER — TRANSITIONAL CARE MANAGEMENT (OUTPATIENT)
Dept: FAMILY MEDICINE CLINIC | Facility: CLINIC | Age: 87
End: 2022-12-07

## 2022-12-07 NOTE — UTILIZATION REVIEW
Initial Clinical Review    Admission: Date/Time/Statement:   Admission Orders (From admission, onward)     Ordered        12/04/22 1708  INPATIENT ADMISSION  Once                      Orders Placed This Encounter   Procedures   • INPATIENT ADMISSION     Standing Status:   Standing     Number of Occurrences:   1     Order Specific Question:   Level of Care     Answer:   Med Surg [16]     Order Specific Question:   Estimated length of stay     Answer:   More than 2 Midnights     Order Specific Question:   Certification     Answer:   I certify that inpatient services are medically necessary for this patient for a duration of greater than two midnights  See H&P and MD Progress Notes for additional information about the patient's course of treatment  ED Arrival Information     Expected   -    Arrival   12/4/2022 12:25    Acuity   Urgent            Means of arrival   Walk-In    Escorted by   Family Member    Service   Hospitalist    Admission type   Emergency            Arrival complaint   sore throat / cough / fever           Chief Complaint   Patient presents with   • Cough     Started 2 days ago   • Generalized Body Aches       Initial Presentation: 82 y o  male who presented self from home to [address] [place], [place] [address]  Inpatient admission for evaluation and treatment of acute on chronic respiratory failure  PMHx: anemia, asthma, BPH, CAD, CHF, COPD on 2L O2 basline, T2DM, GERD, DVT, HTN, PVD  Presented w/ SOB, malaise  Notes partner recently tested positive for flu  Desaturating on 2L O2, temporarily placed on BiPAP for about 1 hour, and started on 5L O2 nasal cannula  On exam, ill-appearing, decreased breath sounds  BNP 2430  ABG:  PH 7 33 pCO2 83 5, PO2 34 4 HCO3 43 7  Respiratory panel negative, suspect false negative  CXR venous congestion   Plan: IV steroids q8h, nebs, azithromycin, incentive spirometry, bipap @ hs, IV lasix BID, tamiflu, 1500 mL fluid restriction, DW, I&O, continue PTA meds, Supplemental O2, weaned as tolerated to baseline  Date: 12/05/22   Day 2: Reports doing better overall  On exam, decreased breath sounds  On 2L O2 today  Plan: continue steroids taper to q12h this evening, azithromycin, nebs, IV lasix BID, tamiflu, 1500 mL fluid restriction, DW, I&O, Supplemental O2, weaned as tolerated to baseline, bipap @ HS, supportive care  Trend labs, replete electrolytes as needed      ED Triage Vitals   Temperature Pulse Respirations Blood Pressure SpO2   12/04/22 1307 12/04/22 1307 12/04/22 1307 12/04/22 1307 12/04/22 1307   98 9 °F (37 2 °C) 88 20 164/78 (!) 36 %      Temp Source Heart Rate Source Patient Position - Orthostatic VS BP Location FiO2 (%)   12/04/22 1307 12/04/22 1307 12/04/22 1307 12/04/22 1307 --   Oral Monitor Sitting Right arm       Pain Score       12/04/22 1830       No Pain          Wt Readings from Last 1 Encounters:   12/05/22 55 5 kg (122 lb 5 7 oz)     Additional Vital Signs:   Date/Time Temp Pulse Resp BP MAP (mmHg) SpO2 Calculated FIO2 (%) - Nasal Cannula Nasal Cannula O2 Flow Rate (L/min) O2 Device   12/05/22 0842 -- 65 19 -- -- 94 % 28 2 L/min Nasal cannula   12/05/22 0725 97 3 °F (36 3 °C) 63 20 157/87 -- 95 % -- -- BiPAP   12/05/22 0450 -- -- -- -- -- 97 % -- -- --   12/04/22 2208 97 9 °F (36 6 °C) 83 20 137/68 -- 92 % 32 3 L/min Nasal cannula   12/04/22 2036 -- -- -- -- -- 91 % -- -- --   12/04/22 2009 97 5 °F (36 4 °C) 77 22 144/78 95 90 % 32 3 L/min Nasal cannula   12/04/22 1900 -- -- 20 154/90 -- 91 % 32 3 L/min Nasal cannula   12/04/22 1830 98 6 °F (37 °C) 78 -- -- -- -- -- -- --   12/04/22 1752 -- 76 18 176/84 Abnormal  -- 93 % 32 3 L/min Nasal cannula   12/04/22 1642 -- 81 20 164/89 -- 92 % -- -- BiPAP   12/04/22 1630 -- 76 20 160/88 -- 96 % -- -- BiPAP   12/04/22 1606 -- -- -- -- -- 95 % -- -- --   12/04/22 1603 -- 78 20 189/93 Abnormal  -- 92 % 32 3 L/min Nasal cannula   12/04/22 1550 -- 74 20 173/98 Abnormal  -- 92 % 32 3 L/min Nasal cannula   12/04/22 1535 -- 73 20 179/118 Abnormal  -- 88 % Abnormal  32 3 L/min Nasal cannula   12/04/22 1513 -- 74 20 141/114 Abnormal  -- 88 % Abnormal  32 3 L/min Nasal cannula   12/04/22 1500 -- 77 20 217/109 Abnormal  -- 90 % 32 3 L/min Nasal cannula   12/04/22 1450 -- 76 -- 215/100 Abnormal  -- 88 % Abnormal  32 3 L/min Nasal cannula   12/04/22 1445 -- 81 20 190/104 Abnormal  -- 88 % Abnormal  32 3 L/min Nasal cannula   12/04/22 1441 -- 79 -- 217/106 Abnormal  -- 92 % 32 3 L/min Nasal cannula   12/04/22 1420 -- 82 20 222/105 Abnormal  -- 91 % -- -- --   12/04/22 1419 -- 82 -- 207/110 Abnormal  -- 91 % 32 3 L/min Nasal cannula   12/04/22 1400 -- 85 20 223/112 Abnormal  -- 91 % 32 3 L/min Nasal cannula   12/04/22 1341 -- 79 20 212/99 Abnormal  -- 91 % 32 3 L/min Nasal cannula   12/04/22 1307 98 9 °F (37 2 °C) 88 20 164/78 -- 36 % Abnormal  -- -- None (Room air)     Pertinent Labs/Diagnostic Test Results:   XR chest portable   Final Result by Girish Hay MD (12/04 2138)      Chronic pulmonary vascular congestion without zayda CHF  No acute findings                    Workstation performed: [id number]           Results from last 7 days   Lab Units 12/04/22  1329   SARS-COV-2  Negative     Results from last 7 days   Lab Units 12/06/22  0416 12/05/22  0443 12/04/22  1329   WBC Thousand/uL 9 14 5 44 7 62   HEMOGLOBIN g/dL 14 0 14 7 15 0   HEMATOCRIT % 47 0 51 7* 52 7*   PLATELETS Thousands/uL 179 129* 149   NEUTROS ABS Thousands/µL 7 71* 4 86 5 92         Results from last 7 days   Lab Units 12/06/22  0416 12/05/22  0443 12/04/22  1329   SODIUM mmol/L 142 141 142   POTASSIUM mmol/L 3 8 4 0 4 5   CHLORIDE mmol/L 91* 93* 93*   CO2 mmol/L 45* 40* 40*   ANION GAP mmol/L 6 8 9   BUN mg/dL 42* 28* 21   CREATININE mg/dL 1 35 1 10 1 01   EGFR ml/min/1 73sq m 47 60 67   CALCIUM mg/dL 8 7 8 9 9 6     Results from last 7 days   Lab Units 12/05/22  0443 12/04/22  1329   AST U/L 33 43   ALT U/L 19 23   ALK PHOS U/L 78 96   TOTAL PROTEIN g/dL 8 0 9 5* ALBUMIN g/dL 3 7 4 4   TOTAL BILIRUBIN mg/dL 0 60 0 91         Results from last 7 days   Lab Units 12/06/22  0416 12/05/22  0443 12/04/22  1329   GLUCOSE RANDOM mg/dL 180* 202* 102*     Results from last 7 days   Lab Units 12/04/22  1648   PH ART  7 337*   PCO2 ART mm Hg 83 5*   PO2 ART mm Hg 94 4   HCO3 ART mmol/L 43 7*   BASE EXC ART mmol/L 13 5   O2 CONTENT ART mL/dL 20 0   O2 HGB, ARTERIAL % 95 0   ABG SOURCE  Radial, Right     Results from last 7 days   Lab Units 12/04/22  1329   PH DUSTIN  7 286*   PCO2 DUSTIN mm Hg 83 4*   PO2 DUSTIN mm Hg 25 3*   HCO3 DUSTIN mmol/L 38 8*   BASE EXC DUSTIN mmol/L 8 2   O2 CONTENT DUSTIN ml/dL 9 1   O2 HGB, VENOUS % 40 8*     Results from last 7 days   Lab Units 12/04/22  1802 12/04/22  1535 12/04/22  1329   HS TNI 0HR ng/L  --   --  27   HS TNI 2HR ng/L  --  25  --    HSTNI D2 ng/L  --  -2  --    HS TNI 4HR ng/L 26  --   --    HSTNI D4 ng/L -1  --   --          Results from last 7 days   Lab Units 12/04/22  1329   PROTIME seconds 14 6*   INR  1 11   PTT seconds 30         Results from last 7 days   Lab Units 12/04/22  1329   PROCALCITONIN ng/ml 0 18     Results from last 7 days   Lab Units 12/04/22  1329   LACTIC ACID mmol/L 1 4     Results from last 7 days   Lab Units 12/04/22  1329   NT-PRO BNP pg/mL 2,430*       Results from last 7 days   Lab Units 12/04/22  1329   INFLUENZA A PCR  Negative   INFLUENZA B PCR  Negative   RSV PCR  Negative     Results from last 7 days   Lab Units 12/04/22  1329   BLOOD CULTURE  No Growth at 48 hrs  No Growth at 48 hrs           ED Treatment:   Medication Administration from 12/04/2022 1225 to 12/04/2022 1818       Date/Time Order Dose Route Action     12/04/2022 1335 EST albuterol inhalation solution 10 mg 10 mg Nebulization Given     12/04/2022 1335 EST ipratropium (ATROVENT) 0 02 % inhalation solution 1 mg 1 mg Nebulization Given     12/04/2022 1335 EST sodium chloride 0 9 % inhalation solution 3 mL 3 mL Nebulization Given     12/04/2022 1335 EST methylPREDNISolone sodium succinate (Solu-MEDROL) injection 125 mg 125 mg Intravenous Given     12/04/2022 1405 EST labetalol (NORMODYNE) injection 5 mg 5 mg Intravenous Given     12/04/2022 1445 EST furosemide (LASIX) injection 20 mg 20 mg Intravenous Given     12/04/2022 1448 EST nitroglycerin (NITRO-BID) 2 % TD ointment 1 inch 1 inch Topical Given     12/04/2022 1443 EST labetalol (NORMODYNE) injection 5 mg 5 mg Intravenous Given     12/04/2022 1745 EST azithromycin (ZITHROMAX) tablet 500 mg 500 mg Oral Given     12/04/2022 1747 EST furosemide (LASIX) injection 40 mg 40 mg Intravenous Given        Past Medical History:   Diagnosis Date   • Acute metabolic encephalopathy 42/26/7661   • Anemia    • Appendicolith    • Ascending aortic aneurysm     3 7   • Asthma    • BPH (benign prostatic hyperplasia)    • CAD (coronary artery disease)     noted on CT scan   • CHF (congestive heart failure) ([facility])    • COPD (chronic obstructive pulmonary disease) ([facility])    • Descending thoracic aortic aneurysm    • Diabetes mellitus ([facility] )    • Diverticulosis    • Former tobacco use    • GERD (gastroesophageal reflux disease)    • History of DVT (deep vein thrombosis)     Left leg   • History of transfusion    • Hypertension    • Inguinal hernia     right   • Nephrolithiasis    • Oxygen dependent     2LNC   • Oxygen dependent    • Pneumonia    • Pre-diabetes    • Prostate calculus    • PVD (peripheral vascular disease) ([facility])    • Ulcer    • Varicose vein of leg     b/l     Present on Admission:  • Chronic diastolic CHF (congestive heart failure) ([facility])  • Chronic obstructive pulmonary disease with acute exacerbation ([facility])  • Dementia in other diseases classified elsewhere, unspecified severity, without behavioral disturbance, psychotic disturbance, mood disturbance, and anxiety ([facility])      Admitting Diagnosis: Cough [R05 9]  Respiratory failure with hypercapnia ([facility] ) [J96 92]  Generalized body aches [R52]  Age/Sex: 82 y o  male  Admission Orders:  Cardiac Diet,  1500 mL fluid restriction  DW  I&O  SCDs  BiPAP @ HS  Telemetry  Scheduled Medications:  azithromycin, 500 mg, Oral, Q24H  enoxaparin, 40 mg, Subcutaneous, Daily  Fluticasone Furoate-Vilanterol, 1 puff, Inhalation, Daily  furosemide, 40 mg, Intravenous, BID (diuretic)  levalbuterol, 1 25 mg, Nebulization, TID   And  ipratropium, 0 5 mg, Nebulization, TID  methylPREDNISolone sodium succinate, 40 mg, Intravenous, Q8H KIKE  metoprolol tartrate, 50 mg, Oral, Q12H  oseltamivir, 75 mg, Oral, Q12H KIKE  pantoprazole, 40 mg, Oral, Early Morning    Continuous IV Infusions:No current facility-administered medications for this encounter  none    PRN Meds:  No current facility-administered medications for this encounter  IP CONSULT TO NUTRITION SERVICES    Network Utilization Review Department  ATTENTION: Please call with any questions or concerns to [phone] and carefully listen to the prompts so that you are directed to the right person  All voicemails are confidential   Belinda Bonds all requests for admission clinical reviews, approved or denied determinations and any other requests to dedicated fax number below belonging to the campus where the patient is receiving treatment   List of dedicated fax numbers for the Facilities:  [address] [place] [address] DENIALS (Administrative/Medical Necessity) [phone]   [address] (Maternity/NICU/Pediatrics) [phone]   [address] [phone]   [facility] [phone]   [facility] [phone]   [facility] [place] [address] Medical [address] [place] [address] [facility] [address]   [address] [facility] [phone]   [facility] [address]  [address] [facility] [phone]

## 2022-12-07 NOTE — UTILIZATION REVIEW
NOTIFICATION OF ADMISSION DISCHARGE   This is a Notification of Discharge from [facility]  Please be advised that this patient has been discharge from our facility  Below you will find the admission and discharge date and time including the patient’s disposition  UTILIZATION REVIEW CONTACT:  Vincent Lindsey MA  Utilization   Network Utilization Review Department  Phone: [phone] x carefully listen to the prompts  All voicemails are confidential   Email: [email or web address]     ADMISSION INFORMATION  PRESENTATION DATE: 12/4/2022  1:05 PM  OBERVATION ADMISSION DATE:   INPATIENT ADMISSION DATE: 12/4/22  5:08 PM   DISCHARGE DATE: 12/6/2022  1:23 PM   DISPOSITION:Home/Self Care    IMPORTANT INFORMATION:  Send all requests for admission clinical reviews, approved or denied determinations and any other requests to dedicated fax number below belonging to the campus where the patient is receiving treatment   List of dedicated fax numbers:  [address] DENIALS (Administrative/Medical Necessity) [phone]   [address] (Maternity/NICU/Pediatrics) [phone]   [facility] [phone]   [place] [address] [phone]   [address] [phone]   220 [facility] [phone] 90 [facility] [phone]   [address] [place], [place] [address] [phone]   [facility]  [phone]   [address] [facility] [phone]   [facility] [address] [facility] [phone]

## 2022-12-09 ENCOUNTER — OFFICE VISIT (OUTPATIENT)
Dept: FAMILY MEDICINE CLINIC | Facility: CLINIC | Age: 87
End: 2022-12-09

## 2022-12-09 VITALS
BODY MASS INDEX: 23.6 KG/M2 | OXYGEN SATURATION: 91 % | WEIGHT: 125 LBS | SYSTOLIC BLOOD PRESSURE: 120 MMHG | TEMPERATURE: 97.9 F | HEART RATE: 52 BPM | HEIGHT: 61 IN | RESPIRATION RATE: 20 BRPM | DIASTOLIC BLOOD PRESSURE: 70 MMHG

## 2022-12-09 DIAGNOSIS — R73.03 PRE-DIABETES: ICD-10-CM

## 2022-12-09 DIAGNOSIS — Z76.89 ENCOUNTER FOR SUPPORT AND COORDINATION OF TRANSITION OF CARE: Primary | ICD-10-CM

## 2022-12-09 DIAGNOSIS — J96.11 CHRONIC RESPIRATORY FAILURE WITH HYPOXIA (HCC): ICD-10-CM

## 2022-12-09 DIAGNOSIS — I10 BENIGN ESSENTIAL HYPERTENSION: ICD-10-CM

## 2022-12-09 DIAGNOSIS — J44.1 CHRONIC OBSTRUCTIVE PULMONARY DISEASE WITH ACUTE EXACERBATION (HCC): ICD-10-CM

## 2022-12-09 PROBLEM — Z98.890 S/P HERNIA REPAIR: Status: ACTIVE | Noted: 2019-02-03

## 2022-12-09 PROBLEM — Z87.19 S/P HERNIA REPAIR: Status: ACTIVE | Noted: 2019-02-03

## 2022-12-09 PROBLEM — Z99.81 SUPPLEMENTAL OXYGEN DEPENDENT: Status: ACTIVE | Noted: 2022-03-22

## 2022-12-09 LAB
BACTERIA BLD CULT: NORMAL
BACTERIA BLD CULT: NORMAL
SL AMB POCT HEMOGLOBIN AIC: 6.2 (ref ?–6.5)

## 2022-12-09 RX ORDER — BUDESONIDE AND FORMOTEROL FUMARATE DIHYDRATE 160; 4.5 UG/1; UG/1
AEROSOL RESPIRATORY (INHALATION)
COMMUNITY
Start: 2022-11-07 | End: 2022-12-09

## 2022-12-09 RX ORDER — ATORVASTATIN CALCIUM 10 MG/1
TABLET, FILM COATED ORAL
COMMUNITY
Start: 2022-11-08 | End: 2022-12-09 | Stop reason: ALTCHOICE

## 2022-12-09 RX ORDER — TIOTROPIUM BROMIDE AND OLODATEROL 3.124; 2.736 UG/1; UG/1
2 SPRAY, METERED RESPIRATORY (INHALATION) DAILY
Qty: 4 G | Refills: 5 | Status: SHIPPED | OUTPATIENT
Start: 2022-12-09

## 2022-12-09 RX ORDER — UMECLIDINIUM 62.5 UG/1
AEROSOL, POWDER ORAL
COMMUNITY
Start: 2022-11-10 | End: 2022-12-09 | Stop reason: ALTCHOICE

## 2022-12-09 RX ORDER — LOSARTAN POTASSIUM 25 MG/1
25 TABLET ORAL DAILY
Qty: 90 TABLET | Refills: 3 | Status: SHIPPED | OUTPATIENT
Start: 2022-12-09

## 2022-12-09 RX ORDER — POTASSIUM CHLORIDE 1500 MG/1
TABLET, FILM COATED, EXTENDED RELEASE ORAL
COMMUNITY
Start: 2022-12-01 | End: 2022-12-09 | Stop reason: ALTCHOICE

## 2022-12-09 RX ORDER — PREDNISONE 10 MG/1
TABLET ORAL
Qty: 20 TABLET | Refills: 0 | Status: SHIPPED | OUTPATIENT
Start: 2022-12-09 | End: 2022-12-15

## 2022-12-09 RX ORDER — PREDNISONE 1 MG/1
5 TABLET ORAL DAILY
Qty: 30 TABLET | Refills: 0 | Status: SHIPPED | OUTPATIENT
Start: 2022-12-09

## 2022-12-09 NOTE — PROGRESS NOTES
Name: Phuc Stubbs      : [id number]      MRN: [id number]  Encounter Provider: Peewee Bowling MD  Encounter Date: 2022   Encounter department: [facility]   Recommended to keep patient on 2 L of oxygen, explained to patient son and his wife about watch the oximeter or any changes  Keep in high levels of oxygen was not recommended  Keep oximeter at 90% to 94%  Patient will continue on prednisone 30 mg for 3 days then prednisone 20 mg for 3 days then prednisone 10 mg for 3 days then prednisone 5 mg daily for 30 days  He will continue on long acting antimuscarinic  Stiolto was prescribed  Recommended to use a spacer with inhaler since patient has very poor inhalation effort  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  1  Encounter for support and coordination of transition of care    2  Chronic obstructive pulmonary disease with acute exacerbation (HCC)  -     predniSONE 10 mg tablet; Take 3 tablets daily for 3 days then 2 tablets daily for 3 days then 1 tablet daily  for 3 days, then continue with 1 tablet of 5 mg for 30 days  -     tiotropium-olodaterol (Stiolto Respimat) 2 5-2 5 MCG/ACT inhaler; Inhale 2 puffs daily  -     Spacer Device for Inhaler  -     predniSONE 5 mg tablet; Take 1 tablet (5 mg total) by mouth daily    3  Pre-diabetes  -     POCT hemoglobin A1c    4  Chronic respiratory failure with hypoxia ([facility] [address] )  -     Ambulatory Referral to Pulmonary Rehabilitation; Future    5  Benign essential hypertension  -     losartan (COZAAR) 25 mg tablet; Take 1 tablet (25 mg total) by mouth daily         TCM Call     Date and time call was made  2022  9:06 AM    Patient was hospitialized at  [facility]    Date of Admission  22    Date of discharge  22    Disposition  Home    Current Symptoms  None      TCM Call     Post hospital issues  None    Scheduled for follow up?   Yes    Did you obtain your prescribed medications  Yes    Do you need help managing your prescriptions or medications  No    Is transportation to your appointment needed  No    I have advised the patient to call PCP with any new or worsening symptoms  Jessiedlian ARGUELLO    Have you fallen in the last 12 months  No    Interperter language line needed  No        Subjective        Patient was discharged from hospital on December 6, 2022 after 48 hours admitted with COPD with exacerbation and hypoxemia  Was tested for flu negative when his wife was positive  He required BiPAP in the emergency room  Troponins were mildly elevated  Suspected flu test was false negative  Patient was discharged on prednisone and azithromycin     Patient states he is having hallucinations on Tamiflu  Tamiflu was stopped by his family  Review of Systems   Constitutional: Negative for diaphoresis, fatigue, fever and unexpected weight change  Respiratory: Positive for shortness of breath (on oxygen)  Negative for apnea, cough, choking and chest tightness  Cardiovascular: Negative for chest pain, palpitations and leg swelling  Gastrointestinal: Negative for abdominal distention, abdominal pain, anal bleeding, blood in stool and constipation  Musculoskeletal: Negative for arthralgias, back pain, gait problem and joint swelling  Neurological: Negative for dizziness, facial asymmetry, light-headedness and headaches  Psychiatric/Behavioral: Negative for behavioral problems, dysphoric mood and self-injury  The patient is not nervous/anxious  Current Outpatient Medications on File Prior to Visit   Medication Sig   • pantoprazole (PROTONIX) 40 mg tablet        Objective     /70 (BP Location: Left arm, Patient Position: Sitting, Cuff Size: Standard)   Pulse (!) 52   Temp 97 9 °F (36 6 °C) (Tympanic)   Resp 20   Ht 5' 1" (1 549 m)   Wt 56 7 kg (125 lb)   SpO2 91% Comment: pt on 3 liters oxygen  BMI 23 62 kg/m²   In room air patient was hypoxemic  With 3 L of oxygen he was about 95%  After walk-in in the office hallway on 1 L of oxygen the pulse ox oximeter showed decrease of oxygen to 83%  Patient remain about 90% with 2 L of oxygen  Physical Exam  Vitals and nursing note reviewed  Neck:      Thyroid: No thyroid mass or thyromegaly  Vascular: No carotid bruit or JVD  Trachea: No tracheal tenderness  Cardiovascular:      Rate and Rhythm: Normal rate and regular rhythm  No extrasystoles are present  Pulses: Normal pulses  Heart sounds: Normal heart sounds  Heart sounds not distant  No friction rub  Pulmonary:      Effort: No tachypnea or bradypnea  Breath sounds: No stridor  Comments: Very poor entrance of air  Poor air movement through auscultation  On 3 L of oxygen through a nasal cannula  Abdominal:      General: Bowel sounds are normal  There is no abdominal bruit  Palpations: Abdomen is soft  There is no hepatomegaly or splenomegaly  Hernia: No hernia is present  Musculoskeletal:      Cervical back: No edema or rigidity  Comments: Walking difficulty, he is using a walker  Skin:     General: Skin is warm and dry  Neurological:      Mental Status: He is oriented to person, place, and time  Deep Tendon Reflexes: Reflexes are normal and symmetric  Psychiatric:         Behavior: Behavior normal          Thought Content:  Thought content normal          Judgment: Judgment normal        Braxton Meza MD

## 2022-12-15 ENCOUNTER — OFFICE VISIT (OUTPATIENT)
Dept: FAMILY MEDICINE CLINIC | Facility: CLINIC | Age: 87
End: 2022-12-15

## 2022-12-15 VITALS
SYSTOLIC BLOOD PRESSURE: 120 MMHG | HEART RATE: 88 BPM | RESPIRATION RATE: 24 BRPM | OXYGEN SATURATION: 82 % | TEMPERATURE: 97.8 F | DIASTOLIC BLOOD PRESSURE: 70 MMHG

## 2022-12-15 DIAGNOSIS — J44.1 CHRONIC OBSTRUCTIVE PULMONARY DISEASE WITH ACUTE EXACERBATION (HCC): Primary | ICD-10-CM

## 2022-12-15 RX ORDER — ACETYLCYSTEINE 100 MG/ML
4 SOLUTION ORAL; RESPIRATORY (INHALATION)
Qty: 80 ML | Refills: 0 | Status: SHIPPED | OUTPATIENT
Start: 2022-12-15

## 2022-12-15 RX ORDER — PREDNISONE 10 MG/1
TABLET ORAL
Qty: 90 TABLET | Refills: 0 | Status: SHIPPED | OUTPATIENT
Start: 2022-12-15 | End: 2022-12-23

## 2022-12-15 NOTE — PROGRESS NOTES
Name: Bull De Leon      :       MRN: [id number]  Encounter Provider: Azam Alvarenga MD  Encounter Date: 12/15/2022   Encounter department: [facility]   He must use oxygen 2 lt at rest but 4 lts when walking  The importance of clear mucous and phlegms will help to improve oxygenation  Exercise and oxygen are the main treatment that improves survival     Requested to have nebulization with NAD  He will continue previous treatment with LAMA AND LABA  Explained to patient's family the increased risk when using ICS's  I did not find PFT  Diagnosis was made in base of Imaging and history countries  recommended by GOLD guidelines/    1  Chronic obstructive pulmonary disease with acute exacerbation (HCC)  -     acetylcysteine (MUCOMYST) 10 % nebulizer solution; Take 4 mL by nebulization every 4 (four) hours           Subjective      Pt arrived in office with home oxygen, oxygen saturation 82%  PT coughing  Office oxygen placed 3 liters, oxygen improved to 86% when walking, 92 at rest,    Review of Systems   Constitutional: Negative for diaphoresis, fatigue, fever and unexpected weight change  Respiratory: Negative for apnea, cough, choking, chest tightness and shortness of breath  Cardiovascular: Negative for chest pain, palpitations and leg swelling  Gastrointestinal: Negative for abdominal distention, abdominal pain, anal bleeding, blood in stool and constipation  Musculoskeletal: Negative for arthralgias, back pain, gait problem and joint swelling  Neurological: Negative for dizziness, facial asymmetry, light-headedness and headaches  Psychiatric/Behavioral: Negative for behavioral problems, dysphoric mood and self-injury  The patient is not nervous/anxious          Current Outpatient Medications on File Prior to Visit   Medication Sig   • losartan (COZAAR) 25 mg tablet Take 1 tablet (25 mg total) by mouth daily   • pantoprazole (PROTONIX) 40 mg tablet    • predniSONE 5 mg tablet Take 1 tablet (5 mg total) by mouth daily   • tiotropium-olodaterol (Stiolto Respimat) 2 5-2 5 MCG/ACT inhaler Inhale 2 puffs daily       Objective     /70 (BP Location: Left arm, Patient Position: Sitting, Cuff Size: Standard)   Pulse 88   Temp 97 8 °F (36 6 °C) (Tympanic)   Resp (!) 24   SpO2 (!) 82% Comment: pt wearing home oxygen 2 liters    Physical Exam  Vitals and nursing note reviewed  Neck:      Thyroid: No thyroid mass or thyromegaly  Vascular: No carotid bruit or JVD  Trachea: No tracheal tenderness  Cardiovascular:      Rate and Rhythm: Normal rate and regular rhythm  No extrasystoles are present  Pulses: Normal pulses  Heart sounds: Normal heart sounds  Heart sounds not distant  No friction rub  Pulmonary:      Effort: No tachypnea or bradypnea  Breath sounds: No stridor  Comments: Decreased breath sound bilateral ,  Abdominal:      General: Bowel sounds are normal  There is no abdominal bruit  Palpations: Abdomen is soft  There is no hepatomegaly or splenomegaly  Hernia: No hernia is present  Musculoskeletal:         General: Normal range of motion  Cervical back: No edema or rigidity  Skin:     General: Skin is warm and dry  Coloration: Skin is pale  Neurological:      Mental Status: He is oriented to person, place, and time  Deep Tendon Reflexes: Reflexes are normal and symmetric  Psychiatric:         Behavior: Behavior normal          Thought Content:  Thought content normal          Judgment: Judgment normal        Yael Concepcion MD

## 2022-12-20 ENCOUNTER — HOSPITAL ENCOUNTER (EMERGENCY)
Facility: HOSPITAL | Age: 87
End: 2022-12-21
Attending: EMERGENCY MEDICINE

## 2022-12-20 ENCOUNTER — APPOINTMENT (EMERGENCY)
Dept: CT IMAGING | Facility: HOSPITAL | Age: 87
End: 2022-12-20

## 2022-12-20 DIAGNOSIS — K56.609 SMALL BOWEL OBSTRUCTION (HCC): Primary | ICD-10-CM

## 2022-12-20 DIAGNOSIS — K40.30 INGUINAL HERNIA WITH IRREDUCIBILITY: ICD-10-CM

## 2022-12-20 LAB
ALBUMIN SERPL BCP-MCNC: 3.4 G/DL (ref 3.5–5)
ALP SERPL-CCNC: 68 U/L (ref 43–122)
ALT SERPL W P-5'-P-CCNC: 22 U/L
ANION GAP SERPL CALCULATED.3IONS-SCNC: 3 MMOL/L (ref 5–14)
AST SERPL W P-5'-P-CCNC: 26 U/L (ref 17–59)
BASOPHILS # BLD AUTO: 0.02 THOUSANDS/ÂΜL (ref 0–0.1)
BASOPHILS NFR BLD AUTO: 0 % (ref 0–1)
BILIRUB SERPL-MCNC: 0.66 MG/DL (ref 0.2–1)
BUN SERPL-MCNC: 28 MG/DL (ref 5–25)
CALCIUM ALBUM COR SERPL-MCNC: 10.1 MG/DL (ref 8.3–10.1)
CALCIUM SERPL-MCNC: 9.6 MG/DL (ref 8.4–10.2)
CHLORIDE SERPL-SCNC: 95 MMOL/L (ref 96–108)
CO2 SERPL-SCNC: 41 MMOL/L (ref 21–32)
CREAT SERPL-MCNC: 0.83 MG/DL (ref 0.7–1.5)
EOSINOPHIL # BLD AUTO: 0.04 THOUSAND/ÂΜL (ref 0–0.61)
EOSINOPHIL NFR BLD AUTO: 0 % (ref 0–6)
ERYTHROCYTE [DISTWIDTH] IN BLOOD BY AUTOMATED COUNT: 13.8 % (ref 11.6–15.1)
GFR SERPL CREATININE-BSD FRML MDRD: 80 ML/MIN/1.73SQ M
GLUCOSE SERPL-MCNC: 135 MG/DL (ref 70–99)
HCT VFR BLD AUTO: 48.8 % (ref 36.5–49.3)
HGB BLD-MCNC: 13.9 G/DL (ref 12–17)
IMM GRANULOCYTES # BLD AUTO: 0.05 THOUSAND/UL (ref 0–0.2)
IMM GRANULOCYTES NFR BLD AUTO: 1 % (ref 0–2)
LACTATE SERPL-SCNC: 1.3 MMOL/L (ref 0.5–2)
LIPASE SERPL-CCNC: 94 U/L (ref 23–300)
LYMPHOCYTES # BLD AUTO: 0.88 THOUSANDS/ÂΜL (ref 0.6–4.47)
LYMPHOCYTES NFR BLD AUTO: 8 % (ref 14–44)
MCH RBC QN AUTO: 30.3 PG (ref 26.8–34.3)
MCHC RBC AUTO-ENTMCNC: 28.5 G/DL (ref 31.4–37.4)
MCV RBC AUTO: 107 FL (ref 82–98)
MONOCYTES # BLD AUTO: 0.76 THOUSAND/ÂΜL (ref 0.17–1.22)
MONOCYTES NFR BLD AUTO: 7 % (ref 4–12)
NEUTROPHILS # BLD AUTO: 8.97 THOUSANDS/ÂΜL (ref 1.85–7.62)
NEUTS SEG NFR BLD AUTO: 84 % (ref 43–75)
NRBC BLD AUTO-RTO: 0 /100 WBCS
PLATELET # BLD AUTO: 199 THOUSANDS/UL (ref 149–390)
PMV BLD AUTO: 9 FL (ref 8.9–12.7)
POTASSIUM SERPL-SCNC: 4.6 MMOL/L (ref 3.5–5.3)
PROT SERPL-MCNC: 6.8 G/DL (ref 6.4–8.4)
RBC # BLD AUTO: 4.58 MILLION/UL (ref 3.88–5.62)
SODIUM SERPL-SCNC: 139 MMOL/L (ref 135–147)
WBC # BLD AUTO: 10.72 THOUSAND/UL (ref 4.31–10.16)

## 2022-12-20 RX ADMIN — MORPHINE SULFATE 2 MG: 2 INJECTION, SOLUTION INTRAMUSCULAR; INTRAVENOUS at 19:32

## 2022-12-20 RX ADMIN — IOHEXOL 100 ML: 350 INJECTION, SOLUTION INTRAVENOUS at 20:34

## 2022-12-20 RX ADMIN — SODIUM CHLORIDE 250 ML: 0.9 INJECTION, SOLUTION INTRAVENOUS at 23:37

## 2022-12-21 ENCOUNTER — HOSPITAL ENCOUNTER (INPATIENT)
Facility: HOSPITAL | Age: 87
LOS: 2 days | Discharge: HOME | End: 2022-12-23
Attending: SURGERY | Admitting: SURGERY

## 2022-12-21 VITALS
DIASTOLIC BLOOD PRESSURE: 97 MMHG | OXYGEN SATURATION: 100 % | RESPIRATION RATE: 16 BRPM | TEMPERATURE: 98 F | SYSTOLIC BLOOD PRESSURE: 186 MMHG | HEART RATE: 73 BPM

## 2022-12-21 DIAGNOSIS — J96.22 ACUTE ON CHRONIC RESPIRATORY FAILURE WITH HYPERCAPNIA (HCC): ICD-10-CM

## 2022-12-21 DIAGNOSIS — K40.30 INGUINAL HERNIA, INCARCERATED: ICD-10-CM

## 2022-12-21 DIAGNOSIS — I71.22 ANEURYSM OF AORTIC ARCH WITHOUT RUPTURE: ICD-10-CM

## 2022-12-21 DIAGNOSIS — I50.33 ACUTE ON CHRONIC DIASTOLIC CONGESTIVE HEART FAILURE (HCC): ICD-10-CM

## 2022-12-21 DIAGNOSIS — I16.0 HYPERTENSIVE URGENCY: Primary | ICD-10-CM

## 2022-12-21 LAB
ABO GROUP BLD: NORMAL
ALBUMIN SERPL BCP-MCNC: 3 G/DL (ref 3.5–5)
ALP SERPL-CCNC: 71 U/L (ref 46–116)
ALT SERPL W P-5'-P-CCNC: 27 U/L (ref 12–78)
ANION GAP SERPL CALCULATED.3IONS-SCNC: 0 MMOL/L (ref 4–13)
AST SERPL W P-5'-P-CCNC: 24 U/L (ref 5–45)
ATRIAL RATE: 75 BPM
BASOPHILS # BLD AUTO: 0.03 THOUSANDS/ÂΜL (ref 0–0.1)
BASOPHILS NFR BLD AUTO: 0 % (ref 0–1)
BILIRUB SERPL-MCNC: 0.71 MG/DL (ref 0.2–1)
BLD GP AB SCN SERPL QL: NEGATIVE
BUN SERPL-MCNC: 22 MG/DL (ref 5–25)
CALCIUM ALBUM COR SERPL-MCNC: 10.6 MG/DL (ref 8.3–10.1)
CALCIUM SERPL-MCNC: 9.8 MG/DL (ref 8.3–10.1)
CHLORIDE SERPL-SCNC: 97 MMOL/L (ref 96–108)
CO2 SERPL-SCNC: 42 MMOL/L (ref 21–32)
CREAT SERPL-MCNC: 0.79 MG/DL (ref 0.6–1.3)
EOSINOPHIL # BLD AUTO: 0.01 THOUSAND/ÂΜL (ref 0–0.61)
EOSINOPHIL NFR BLD AUTO: 0 % (ref 0–6)
ERYTHROCYTE [DISTWIDTH] IN BLOOD BY AUTOMATED COUNT: 13.8 % (ref 11.6–15.1)
GFR SERPL CREATININE-BSD FRML MDRD: 81 ML/MIN/1.73SQ M
GLUCOSE SERPL-MCNC: 108 MG/DL (ref 65–140)
HCT VFR BLD AUTO: 51.6 % (ref 36.5–49.3)
HGB BLD-MCNC: 15.2 G/DL (ref 12–17)
IMM GRANULOCYTES # BLD AUTO: 0.03 THOUSAND/UL (ref 0–0.2)
IMM GRANULOCYTES NFR BLD AUTO: 0 % (ref 0–2)
LACTATE SERPL-SCNC: 1.9 MMOL/L (ref 0.5–2)
LYMPHOCYTES # BLD AUTO: 1.49 THOUSANDS/ÂΜL (ref 0.6–4.47)
LYMPHOCYTES NFR BLD AUTO: 14 % (ref 14–44)
MAGNESIUM SERPL-MCNC: 2.2 MG/DL (ref 1.6–2.6)
MCH RBC QN AUTO: 30.5 PG (ref 26.8–34.3)
MCHC RBC AUTO-ENTMCNC: 29.5 G/DL (ref 31.4–37.4)
MCV RBC AUTO: 104 FL (ref 82–98)
MONOCYTES # BLD AUTO: 0.87 THOUSAND/ÂΜL (ref 0.17–1.22)
MONOCYTES NFR BLD AUTO: 8 % (ref 4–12)
NEUTROPHILS # BLD AUTO: 8.58 THOUSANDS/ÂΜL (ref 1.85–7.62)
NEUTS SEG NFR BLD AUTO: 78 % (ref 43–75)
NRBC BLD AUTO-RTO: 0 /100 WBCS
P AXIS: 78 DEGREES
PHOSPHATE SERPL-MCNC: 4.8 MG/DL (ref 2.3–4.1)
PLATELET # BLD AUTO: 212 THOUSANDS/UL (ref 149–390)
PMV BLD AUTO: 8.8 FL (ref 8.9–12.7)
POTASSIUM SERPL-SCNC: 4.6 MMOL/L (ref 3.5–5.3)
PR INTERVAL: 140 MS
PROT SERPL-MCNC: 7.1 G/DL (ref 6.4–8.4)
QRS AXIS: 74 DEGREES
QRSD INTERVAL: 84 MS
QT INTERVAL: 384 MS
QTC INTERVAL: 428 MS
RBC # BLD AUTO: 4.98 MILLION/UL (ref 3.88–5.62)
RH BLD: POSITIVE
SODIUM SERPL-SCNC: 139 MMOL/L (ref 135–147)
SPECIMEN EXPIRATION DATE: NORMAL
T WAVE AXIS: 49 DEGREES
VENTRICULAR RATE: 75 BPM
WBC # BLD AUTO: 11.01 THOUSAND/UL (ref 4.31–10.16)

## 2022-12-21 RX ORDER — GABAPENTIN 100 MG/1
100 CAPSULE ORAL
Status: DISCONTINUED | OUTPATIENT
Start: 2022-12-21 | End: 2022-12-23 | Stop reason: HOSPADM

## 2022-12-21 RX ORDER — FORMOTEROL FUMARATE 20 UG/2ML
20 SOLUTION RESPIRATORY (INHALATION)
Status: DISCONTINUED | OUTPATIENT
Start: 2022-12-21 | End: 2022-12-21

## 2022-12-21 RX ORDER — LOSARTAN POTASSIUM 25 MG/1
25 TABLET ORAL ONCE
Status: COMPLETED | OUTPATIENT
Start: 2022-12-21 | End: 2022-12-21

## 2022-12-21 RX ORDER — FORMOTEROL FUMARATE 20 UG/2ML
20 SOLUTION RESPIRATORY (INHALATION)
Status: DISCONTINUED | OUTPATIENT
Start: 2022-12-21 | End: 2022-12-23 | Stop reason: HOSPADM

## 2022-12-21 RX ORDER — HYDROMORPHONE HCL IN WATER/PF 6 MG/30 ML
0.2 PATIENT CONTROLLED ANALGESIA SYRINGE INTRAVENOUS ONCE
Status: COMPLETED | OUTPATIENT
Start: 2022-12-21 | End: 2022-12-21

## 2022-12-21 RX ORDER — LABETALOL HYDROCHLORIDE 5 MG/ML
10 INJECTION, SOLUTION INTRAVENOUS EVERY 4 HOURS PRN
Status: DISCONTINUED | OUTPATIENT
Start: 2022-12-21 | End: 2022-12-23 | Stop reason: HOSPADM

## 2022-12-21 RX ORDER — OXYCODONE HYDROCHLORIDE 5 MG/1
2.5 TABLET ORAL EVERY 4 HOURS PRN
Status: DISCONTINUED | OUTPATIENT
Start: 2022-12-21 | End: 2022-12-23 | Stop reason: HOSPADM

## 2022-12-21 RX ORDER — METOPROLOL TARTRATE 5 MG/5ML
5 INJECTION INTRAVENOUS EVERY 6 HOURS PRN
Status: DISCONTINUED | OUTPATIENT
Start: 2022-12-21 | End: 2022-12-21

## 2022-12-21 RX ORDER — OXYCODONE HYDROCHLORIDE 5 MG/1
5 TABLET ORAL EVERY 4 HOURS PRN
Status: DISCONTINUED | OUTPATIENT
Start: 2022-12-21 | End: 2022-12-23 | Stop reason: HOSPADM

## 2022-12-21 RX ORDER — ENOXAPARIN SODIUM 100 MG/ML
40 INJECTION SUBCUTANEOUS DAILY
Status: DISCONTINUED | OUTPATIENT
Start: 2022-12-21 | End: 2022-12-23 | Stop reason: HOSPADM

## 2022-12-21 RX ORDER — HYDROMORPHONE HCL IN WATER/PF 6 MG/30 ML
0.2 PATIENT CONTROLLED ANALGESIA SYRINGE INTRAVENOUS EVERY 4 HOURS PRN
Status: DISCONTINUED | OUTPATIENT
Start: 2022-12-21 | End: 2022-12-23 | Stop reason: HOSPADM

## 2022-12-21 RX ORDER — DEXTROSE, SODIUM CHLORIDE, AND POTASSIUM CHLORIDE 5; .45; .15 G/100ML; G/100ML; G/100ML
50 INJECTION INTRAVENOUS CONTINUOUS
Status: DISCONTINUED | OUTPATIENT
Start: 2022-12-21 | End: 2022-12-22

## 2022-12-21 RX ORDER — LOSARTAN POTASSIUM 25 MG/1
TABLET ORAL
Status: DISPENSED
Start: 2022-12-21 | End: 2022-12-21

## 2022-12-21 RX ORDER — HYDRALAZINE HYDROCHLORIDE 20 MG/ML
5 INJECTION INTRAMUSCULAR; INTRAVENOUS EVERY 6 HOURS PRN
Status: DISCONTINUED | OUTPATIENT
Start: 2022-12-21 | End: 2022-12-23 | Stop reason: HOSPADM

## 2022-12-21 RX ORDER — SODIUM CHLORIDE, SODIUM LACTATE, POTASSIUM CHLORIDE, CALCIUM CHLORIDE 600; 310; 30; 20 MG/100ML; MG/100ML; MG/100ML; MG/100ML
75 INJECTION, SOLUTION INTRAVENOUS CONTINUOUS
Status: DISCONTINUED | OUTPATIENT
Start: 2022-12-21 | End: 2022-12-21

## 2022-12-21 RX ORDER — ONDANSETRON 2 MG/ML
4 INJECTION INTRAMUSCULAR; INTRAVENOUS EVERY 6 HOURS PRN
Status: DISCONTINUED | OUTPATIENT
Start: 2022-12-21 | End: 2022-12-23 | Stop reason: HOSPADM

## 2022-12-21 RX ADMIN — LABETALOL HYDROCHLORIDE 10 MG: 5 INJECTION, SOLUTION INTRAVENOUS at 05:01

## 2022-12-21 RX ADMIN — FORMOTEROL FUMARATE DIHYDRATE 20 MCG: 20 SOLUTION RESPIRATORY (INHALATION) at 20:31

## 2022-12-21 RX ADMIN — GABAPENTIN 100 MG: 100 CAPSULE ORAL at 22:00

## 2022-12-21 RX ADMIN — HYDRALAZINE HYDROCHLORIDE 5 MG: 20 INJECTION, SOLUTION INTRAMUSCULAR; INTRAVENOUS at 03:20

## 2022-12-21 RX ADMIN — DEXTROSE, SODIUM CHLORIDE, AND POTASSIUM CHLORIDE 50 ML/HR: 5; .45; .15 INJECTION INTRAVENOUS at 20:54

## 2022-12-21 RX ADMIN — ENOXAPARIN SODIUM 40 MG: 40 INJECTION SUBCUTANEOUS at 10:58

## 2022-12-21 RX ADMIN — IPRATROPIUM BROMIDE 0.5 MG: 0.5 SOLUTION RESPIRATORY (INHALATION) at 20:31

## 2022-12-21 RX ADMIN — LOSARTAN POTASSIUM 25 MG: 25 TABLET, FILM COATED ORAL at 01:00

## 2022-12-21 RX ADMIN — IPRATROPIUM BROMIDE 0.5 MG: 0.5 SOLUTION RESPIRATORY (INHALATION) at 13:16

## 2022-12-21 RX ADMIN — HYDROMORPHONE HYDROCHLORIDE 0.2 MG: 0.2 INJECTION, SOLUTION INTRAMUSCULAR; INTRAVENOUS; SUBCUTANEOUS at 09:40

## 2022-12-21 RX ADMIN — SODIUM CHLORIDE, POTASSIUM CHLORIDE, SODIUM LACTATE AND CALCIUM CHLORIDE 75 ML/HR: 600; 310; 30; 20 INJECTION, SOLUTION INTRAVENOUS at 05:05

## 2022-12-21 RX ADMIN — ONDANSETRON 4 MG: 2 INJECTION INTRAMUSCULAR; INTRAVENOUS at 05:53

## 2022-12-21 NOTE — ED NOTES
Pts current IV access not working   This RN attempted 2 times for access and unsuccessful     Velna Confer  12/21/22 9258

## 2022-12-21 NOTE — CONSULTS
PULMONOLOGY CONSULT NOTE     Name: Brian Lockhart   Age & Sex: 82 y o  male   MRN: [id number]  Unit/Bed#: [facility] 429-01   Encounter: [id number]    Reason for consultation: Chronic respiratory failure with hypoxia, oxygen-dependent COPD    Requesting physician: Myles Schulz MD     Assessment:   1  Chronic respiratory failure with hypoxia, requiring baseline 2L O2 at rest, 3L O2 with exertion  2  Oxygen-dependent COPD  3  Heart failure with preserved EF, home Lasix 20 mg daily; TTE in 3/2022 with EF 18%, rate 1 diastolic dysfunction, acute valvular dysfunction, moderate-severe concentric hypertrophy  4  Hypertension; controlled on losartan  5  Small bowel obstruction, with incarcerated right inguinal hernia containing transition point    Plan:  • 47-year-old male with multiple comorbidities who presented with small bowel obstruction secondary to incarcerated hernia at [facility], subsequently transferred to [facility] for further care and preop medical optimization prior to proceeding with hernia repair during this hospital course; right inguinal hernia reduced in the ED     • Chronically on 2L O2 NC at rest, 3L O2 NC with exertion 2/2 COPD  • Recently admitted (12/4-12/6) for acute on chronic respiratory failure 2/2 COPD exacerbation vs influenza virus; required treatment with BiPAP, and was discharged on azithromycin and steroid taper  • Denies any shortness of breath on admission; no respiratory distress on arrival, poor airway entry in all lung fields but no wheezing exam   Presently maintained on home supplemental O2 requirements   • Clinical presentation not consistent with acute respiratory failure or COPD exacerbation at this time  • Will switch oral inhalers to nebulized Atrovent and formoterol for improved medication compliance and effectiveness  • Titrate O2 to maintain O2 sats at 88-92%  • Maintain n p o  preparation for surgical intervention to treat SBO, pending cardiology clearance  • Further management as per primary team  • Patient is cleared from pulmonary standpoint to proceed with inguinal hernia repair, pending cardiology clearance    History of Present Illness     Blanca Perkins is a 82 y o  Yakut-speaking male with multiple comorbidities including type 2 diabetes, hypertension on losartan, DVT not on AC, HFpEF on Lasix 20, COPD on home O2, descending thoracic aortic aneurysm status post TEVAR, known aortic arch aneurysm followed by cardiology and cardiothoracic surgery, who initially presented to [facility] ED on 12/20 complaining of right groin pain with associated nausea and vomiting  ED work-up revealed incarcerated right inguinal hernia containing bowel with high-grade SBO with transition point within the hernia  Patient was subsequently transferred to [facility] for general surgery evaluation as well as to manage his chronic conditions  Pulmonology service is consulted for medical optimization in setting of recent admission (12/6) for acute on chronic hypoxic respiratory failure 2/2 COPD exacerbation requiring treatment with BiPAP and steroid taper, which he is still currently taking  Patient was also admitted on 4/22 for acute on chronic respiratory failure with hypoxia in setting of acute on chronic diastolic CHF and superimposed pneumonia  He last saw [facility] pulmonology for COPD and chronic respiratory failure with Dr Osei as an outpatient in 6/2020, who initiated him on 2 L O2 at rest and 3 L on exertion, and also him on Symbicort and Spiriva inhalers which were recently switched to LABA/LAMA [place]) by PCP on 12/9/2022  Per wife, patient has been able to ambulate inside his house without significant dyspnea and notes that his wheezing has improved since being discharged from the hospital   Endorses plans to medications and inhalers  Denies recent weight loss  Denies chest pain  No fever or chills  Denies orthopnea, leg swelling    35-pack-year smoking history, quit 2001   Patient has baseline dementia, majority of history noted above obtained via family and chart review  Review of systems:  12 point review of systems was completed and was otherwise negative except as listed in HPI        Historical Information   Past Medical History:   Diagnosis Date   • Acute metabolic encephalopathy 97/79/2689   • Anemia    • Appendicolith    • Ascending aortic aneurysm     3 7   • Asthma    • BPH (benign prostatic hyperplasia)    • CAD (coronary artery disease)     noted on CT scan   • CHF (congestive heart failure) ([facility])    • COPD (chronic obstructive pulmonary disease) ([facility])    • Descending thoracic aortic aneurysm    • Diabetes mellitus ([address] )    • Diverticulosis    • Former tobacco use    • GERD (gastroesophageal reflux disease)    • History of DVT (deep vein thrombosis)     Left leg   • History of transfusion    • Hypertension    • Inguinal hernia     right   • Nephrolithiasis    • Oxygen dependent     2LNC   • Oxygen dependent    • Pneumonia    • Pre-diabetes    • Prostate calculus    • PVD (peripheral vascular disease) ([facility])    • Ulcer    • Varicose vein of leg     b/l     Past Surgical History:   Procedure Laterality Date   • CARDIAC SURGERY     • ESOPHAGOGASTRODUODENOSCOPY     • HERNIA REPAIR Right 1/21/2019    Procedure: REPAIR HERNIA INGUINAL WITH MESH;  Surgeon: Laurita Segura MD;  Location: [address] [place] [address] OR;  Service: General   • INGUINAL HERNIA REPAIR Bilateral    • IR TEVAR  12/27/2018   • MS ENDOVASC TAA REINCL SUBCL N/A 12/27/2018    Procedure: TEVAR - endovascular thoracic aortic aneurysm repair;  Surgeon: Delpha Romberg Doctor, MD;  Location: [facility];  Service: Vascular   • THORACIC AORTIC ANEURYSM REPAIR  12/27/2018   • VARICOSE VEIN SURGERY Bilateral     vein stripping     Family History   Problem Relation Age of Onset   • Tuberculosis Mother    • No Known Problems Father    • Cancer Sister    • Diabetes Family    • Hypertension Family        Social History    Social History: Social History     Socioeconomic History   • Marital status: /Civil Union     Spouse name: Not on file   • Number of children: Not on file   • Years of education: Not on file   • Highest education level: Not on file   Occupational History   • Not on file   Tobacco Use   • Smoking status: Former     Packs/day: 1 00     Years: 35 00     Pack years: 35 00     Types: Cigarettes     Start date:      Quit date:      Years since quittin 9   • Smokeless tobacco: Never   Vaping Use   • Vaping Use: Never used   Substance and Sexual Activity   • Alcohol use: Never   • Drug use: No   • Sexual activity: Never   Other Topics Concern   • Not on file   Social History Narrative    ** Merged History Encounter **          Social Determinants of Health     Financial Resource Strain: Not on file   Food Insecurity: No Food Insecurity   • Worried About Running Out of Food in the Last Year: Never true   • Ran Out of Food in the Last Year: Never true   Transportation Needs: No Transportation Needs   • Lack of Transportation (Medical): No   • Lack of Transportation (Non-Medical):  No   Physical Activity: Not on file   Stress: Not on file   Social Connections: Not on file   Intimate Partner Violence: Not on file   Housing Stability: Low Risk    • Unable to Pay for Housing in the Last Year: No   • Number of Places Lived in the Last Year: 1   • Unstable Housing in the Last Year: No       Occupational History: retired    Meds/Allergies   Current Facility-Administered Medications   Medication Dose Route Frequency   • enoxaparin (LOVENOX) subcutaneous injection 40 mg  40 mg Subcutaneous Daily   • formoterol (PERFOROMIST) nebulizer solution 20 mcg  20 mcg Nebulization Q12H   • gabapentin (NEURONTIN) capsule 100 mg  100 mg Oral HS   • hydrALAZINE (APRESOLINE) injection 5 mg  5 mg Intravenous Q6H PRN   • HYDROmorphone HCl (DILAUDID) injection 0 2 mg  0 2 mg Intravenous Q4H PRN   • influenza vaccine, high-dose (FLUZONE HIGH-DOSE) IM injection RENATA 0 7 mL  0 7 mL Intramuscular Once   • ipratropium (ATROVENT) 0 02 % inhalation solution 0 5 mg  0 5 mg Nebulization TID   • labetalol (NORMODYNE) injection 10 mg  10 mg Intravenous Q4H PRN   • lactated ringers infusion  75 mL/hr Intravenous Continuous   • losartan (COZAAR) 25 mg tablet **ADS Override Pull**       • naloxone (NARCAN) 0 04 mg/mL syringe 0 04 mg  0 04 mg Intravenous Q1MIN PRN   • ondansetron (ZOFRAN) injection 4 mg  4 mg Intravenous Q6H PRN   • oxyCODONE (ROXICODONE) IR tablet 2 5 mg  2 5 mg Oral Q4H PRN   • oxyCODONE (ROXICODONE) IR tablet 5 mg  5 mg Oral Q4H PRN     Medications Prior to Admission   Medication   • acetylcysteine (MUCOMYST) 10 % nebulizer solution   • losartan (COZAAR) 25 mg tablet   • pantoprazole (PROTONIX) 40 mg tablet   • predniSONE 10 mg tablet   • predniSONE 5 mg tablet   • tiotropium-olodaterol (Stiolto Respimat) 2 5-2 5 MCG/ACT inhaler     Allergies   Allergen Reactions   • Penicillins Hives, Itching and Rash   • Lisinopril Rash     Side pains and rash        Objective    Vitals: Blood pressure 93/52, pulse 77, temperature 98 5 °F (36 9 °C), temperature source Oral, resp  rate 16, height 5' 3" (1 6 m), weight 59 4 kg (130 lb 15 3 oz), SpO2 93 % , on 2L O2 NC, Body mass index is 23 2 kg/m²  Intake/Output Summary (Last 24 hours) at 12/21/2022 1255  Last data filed at 12/21/2022 1200  Gross per 24 hour   Intake 131 3 ml   Output --   Net 131 3 ml       Physical Exam  Vitals and nursing note reviewed  Constitutional:       General: He is not in acute distress  Appearance: He is well-developed  He is ill-appearing  He is not toxic-appearing  Comments: Frail appearing   HENT:      Head: Normocephalic and atraumatic  Eyes:      Conjunctiva/sclera: Conjunctivae normal    Cardiovascular:      Rate and Rhythm: Normal rate and regular rhythm  Heart sounds: No murmur heard    Pulmonary:      Effort: Pulmonary effort is normal  No respiratory distress  Breath sounds: Normal breath sounds  No wheezing  Comments: Poor air entry throughout all lung fields; on 2L O2 NC  Abdominal:      General: There is no distension  Palpations: Abdomen is soft  There is no mass  Tenderness: There is abdominal tenderness  Hernia: No hernia is present  Musculoskeletal:         General: No swelling  Cervical back: Neck supple  Right lower leg: No edema  Left lower leg: No edema  Skin:     General: Skin is warm and dry  Capillary Refill: Capillary refill takes less than 2 seconds  Neurological:      Mental Status: He is alert  Psychiatric:         Mood and Affect: Mood normal              Labs: I have personally reviewed pertinent lab results  Laboratory and Diagnostics  Results from last 7 days   Lab Units 12/21/22  0505 12/20/22  1950   WBC Thousand/uL 11 01* 10 72*   HEMOGLOBIN g/dL 15 2 13 9   HEMATOCRIT % 51 6* 48 8   PLATELETS Thousands/uL 212 199   NEUTROS PCT % 78* 84*   MONOS PCT % 8 7     Results from last 7 days   Lab Units 12/21/22  0505 12/20/22  1950   SODIUM mmol/L 139 139   POTASSIUM mmol/L 4 6 4 6   CHLORIDE mmol/L 97 95*   CO2 mmol/L 42* 41*   ANION GAP mmol/L 0* 3*   BUN mg/dL 22 28*   CREATININE mg/dL 0 79 0 83   CALCIUM mg/dL 9 8 9 6   GLUCOSE RANDOM mg/dL 108 135*   ALT U/L 27 22   AST U/L 24 26   ALK PHOS U/L 71 68   ALBUMIN g/dL 3 0* 3 4*   TOTAL BILIRUBIN mg/dL 0 71 0 66     Results from last 7 days   Lab Units 12/21/22  0505   MAGNESIUM mg/dL 2 2   PHOSPHORUS mg/dL 4 8*               Results from last 7 days   Lab Units 12/21/22  0505 12/20/22 2005   LACTIC ACID mmol/L 1 9 1 3                           ABG:       Micro:        Imaging and other studies: I have personally reviewed pertinent reports  Pulmonary function testing: Not on file    EKG, Pathology, and Other Studies: I have personally reviewed pertinent reports               Code Status: Level 1 - Full Code    VTE Pharmacologic Prophylaxis: Enoxaparin (Lovenox)  VTE Mechanical Prophylaxis: sequential compression device    Disclaimer: Portions of the record may have been created with voice recognition software  Occasional wrong word or "sound a like" substitutions may have occurred due to the inherent limitations of voice recognition software  Careful consideration should be taken to recognize, using context, where substitutions have occurred        ----------------------------------------------------  Tammy Steen DO, MS, PGY-1  Internal Medicine Residency   [address] [place] [address]

## 2022-12-21 NOTE — CONSULTS
Consultation - General Cardiology Team 2  [facility] 82 y o  male MRN: [id number]  Unit/Bed#: ED 25 Encounter: [id number]      Inpatient consult to Cardiology  Consult performed by: Naomi Hernández MD  Consult ordered by: Nino Norris MD        PCP: Lisa Saldana MD   Outpatient Cardiologist: Carla Do    History of Present Illness   Physician Requesting Consult: Jorge Nieves MD  Reason for Consult / Principal Problem: CAD, CHF     HPI: [facility] is a 82y o  year old male with hx of CAD, HFpEF (EF of 73% on 4/22), descending thoracic aortic aneurysm s/p TEVAR in 2020, COPD on 2L baseline O2, HTN on losartan, HLD, and dementia who presented with abd pain for the past day  Patient states yesterday morning he started to have abd pain that worsened  He describes it as a sharp pain in the right lower part of his abd that was constant and 9/10  He presented to [facility] ED and was noted to have an irreducible right inguinal hernia  CT scan revealed incarcerated right inguinal hernia with loops of bowel, and high grade SBO  Patient was transferred to [facility] for surgical evaluation and cardiology was consulted for management of CAD and CHF  Patient states his dry weight is 131lbs and adheres to sodium and fluid restrictive diet  He denies chest pain, SOB, leg swelling  Wife is bedside       Review of Systems  Review of system was conducted and was negative except for as stated in the HPI        Historical Information   Past Medical History:   Diagnosis Date   • Acute metabolic encephalopathy 42/75/4348   • Anemia    • Appendicolith    • Ascending aortic aneurysm     3 7   • Asthma    • BPH (benign prostatic hyperplasia)    • CAD (coronary artery disease)     noted on CT scan   • CHF (congestive heart failure) (HCC)    • COPD (chronic obstructive pulmonary disease) ([facility])    • Descending thoracic aortic aneurysm    • Diabetes mellitus ([facility] [address] )    • Diverticulosis    • Former tobacco use    • GERD (gastroesophageal reflux disease)    • History of DVT (deep vein thrombosis)     Left leg   • History of transfusion    • Hypertension    • Inguinal hernia     right   • Nephrolithiasis    • Oxygen dependent     2LNC   • Oxygen dependent    • Pneumonia    • Pre-diabetes    • Prostate calculus    • PVD (peripheral vascular disease) ([facility])    • Ulcer    • Varicose vein of leg     b/l     Past Surgical History:   Procedure Laterality Date   • CARDIAC SURGERY     • ESOPHAGOGASTRODUODENOSCOPY     • HERNIA REPAIR Right 2019    Procedure: REPAIR HERNIA INGUINAL WITH MESH;  Surgeon: Brendan Kenyon MD;  Location: [facility] MAIN OR;  Service: General   • INGUINAL HERNIA REPAIR Bilateral    • IR TEVAR  2018   • WA ENDOVASC TAA REINCL SUBCL N/A 2018    Procedure: TEVAR - endovascular thoracic aortic aneurysm repair;  Surgeon: Terrence Ortega MD;  Location:  MAIN OR;  Service: Vascular   • THORACIC AORTIC ANEURYSM REPAIR  2018   • VARICOSE VEIN SURGERY Bilateral     vein stripping     Social History     Substance and Sexual Activity   Alcohol Use Never     Social History     Substance and Sexual Activity   Drug Use No     Social History     Tobacco Use   Smoking Status Former   • Packs/day: 1 00   • Years: 35 00   • Pack years: 35 00   • Types: Cigarettes   • Start date:    • Quit date:    • Years since quittin 9   Smokeless Tobacco Never     Family History: non-contributory    Meds/Allergies   Hospital Medications:   Current Facility-Administered Medications   Medication Dose Route Frequency   • enoxaparin (LOVENOX) subcutaneous injection 40 mg  40 mg Subcutaneous Daily   • gabapentin (NEURONTIN) capsule 100 mg  100 mg Oral HS   • hydrALAZINE (APRESOLINE) injection 5 mg  5 mg Intravenous Q6H PRN   • HYDROmorphone HCl (DILAUDID) injection 0 2 mg  0 2 mg Intravenous Q4H PRN   • HYDROmorphone HCl (DILAUDID) injection 0 2 mg  0 2 mg Intravenous Once   • labetalol (NORMODYNE) injection 10 mg 10 mg Intravenous Q4H PRN   • lactated ringers infusion  75 mL/hr Intravenous Continuous   • losartan (COZAAR) 25 mg tablet **ADS Override Pull**       • naloxone (NARCAN) 0 04 mg/mL syringe 0 04 mg  0 04 mg Intravenous Q1MIN PRN   • Olodaterol HCl AERS 2 puff  2 puff Inhalation Daily   • ondansetron (ZOFRAN) injection 4 mg  4 mg Intravenous Q6H PRN   • oxyCODONE (ROXICODONE) IR tablet 2 5 mg  2 5 mg Oral Q4H PRN   • oxyCODONE (ROXICODONE) IR tablet 5 mg  5 mg Oral Q4H PRN   • tiotropium (SPIRIVA) capsule for inhaler 18 mcg  18 mcg Inhalation Daily     Home Medications: (Not in a hospital admission)      Allergies   Allergen Reactions   • Penicillins Hives, Itching and Rash   • Lisinopril Rash     Side pains and rash        Objective   Vitals: Blood pressure 145/89, pulse 68, temperature 98 7 °F (37 1 °C), temperature source Oral, resp  rate 21, SpO2 94 %  Orthostatic Blood Pressures    Flowsheet Row Most Recent Value   Blood Pressure 145/89 filed at 12/21/2022 0515   Patient Position - Orthostatic VS Lying filed at 12/21/2022 0153            Invasive Devices     Peripheral Intravenous Line  Duration           Peripheral IV 12/21/22 Right;Ventral (anterior) Forearm <1 day                Physical Exam    GEN: Diana Quispe appears well, alert and oriented x 3, pleasant and cooperative   HEENT:  Normocephalic, atraumatic, anicteric, moist mucous membranes  NECK: No JVD or carotid bruits   HEART: regualr rhythm, regaulr rate, normal S1 and S2, no murmurs, clicks, gallops or rubs   LUNGS: Clear to auscultation bilaterally; no wheezes, rales, or rhonchi; respiration nonlabored   ABDOMEN:  Normoactive bowel sounds, soft, tenderness present, no distention  EXTREMITIES: peripheral pulses palpable; no edema  NEURO: no gross focal findings; cranial nerves grossly intact   SKIN:  Dry, intact, warm to touch    Lab Results: I have personally reviewed pertinent lab results              Results from last 7 days   Lab Units 22  0505 22   POTASSIUM mmol/L 4 6 4 6   CO2 mmol/L 42* 41*   CHLORIDE mmol/L 97 95*   BUN mg/dL 22 28*   CREATININE mg/dL 0 79 0 83     Results from last 7 days   Lab Units 22  0505 22   HEMOGLOBIN g/dL 15 2 13 9   HEMATOCRIT % 51 6* 48 8   PLATELETS Thousands/uL 212 199             Imaging: I have personally reviewed pertinent reports  EKG:   Date: 22  Interpretation: Normal sinus rhythm       Previous STRESS TEST:    NM myocardial perfusion spect (rx stress and/or rest)    Narrative  [address] [facility]  [place], [address]  [phone]    Rest/Stress Gated SPECT Myocardial Perfusion Imaging After Regadenoson    Patient: Tammy Fuentes  MR number: [id number]  Account number: [de-identified]  : 1935  Age: [de-identified] years  Gender: Male  Status: Outpatient  Location: Stress lab  Height: 63 in  Weight: 167 lb  BP: 151/ 108 mmHg    Allergies: PENICILLINS    Diagnosis: R00 2 - Palpitations    Primary Physician:  Nicholas Polo  Technician:  Miri Allen  RN:  Gio Mayfield RN BSN  Referring Physician:  Mychal Duncan MD  Group:  [facility]  Report Prepared By[de-identified]  Gio Mayfield RN BSN  Interpreting Physician:  Mariusz Thomson DO    INDICATIONS: Detection of coronary artery disease  HISTORY: The patient is a [de-identified]year old  male  Chest pain status: chest pain, consistent with atypical angina, associated with dyspnea  Other symptoms: palpitations  Coronary artery disease risk factors: hypertension and former  smoking  Cardiovascular history: peripheral vascular occlusive disease( AAA and thoracic arch Aneurysm)  Co-morbidity: history of lung disease  Medications: an ACE inhibitor/ARB and a lipid lowering agent  Previous test results: abnormal  resting echocardiogram     PHYSICAL EXAM: Baseline physical exam screening: no wheezes audible  REST ECG: Normal sinus rhythm  Normal baseline ECG      PROCEDURE: The study was performed in the stress lab  A regadenoson infusion pharmacologic stress test was performed  Gated SPECT myocardial perfusion imaging was performed after stress and at rest  Systolic blood pressure was 151 mmHg, at  the start of the study  Diastolic blood pressure was 108 mmHg, at the start of the study  The heart rate was 78 bpm, at the start of the study  IV double checked  Regadenoson protocol:  HR bpm SBP mmHg DBP mmHg Symptoms  Baseline 78 151 108 none  Immediate 104 180 104 moderate dyspnea, nausea  3 min 102 167 86 dizziness  5 min 92 121 82 none  No medications or fluids given  The patient also performed low level exercise with leg lifts  STRESS SUMMARY: Duration of pharmacologic stress was 3 min  Maximal heart rate during stress was 104 bpm  The rate-pressure product for the peak heart rate and blood pressure was 83663  There was no chest pain during stress  The stress  test was terminated due to protocol completion  Pre oxygen saturation: 90 %  Peak oxygen saturation: 89 %  Patient wears nasal 02 at home prn  The stress ECG was negative for ischemia  Arrhythmia during stress: isolated atrial premature  beats  ISOTOPE ADMINISTRATION:  Resting isotope administration Stress isotope administration  Agent Tetrofosmin Tetrofosmin  Dose 10 mCi 32 7 mCi  Date 09/05/2017 09/05/2017  Injection time 09:14 10:52  Imaging time 09:49 11:17  Injection-image interval 35 min 25 min    The radiopharmaceutical was injected at the peak effect of pharmacologic stress  MYOCARDIAL PERFUSION IMAGING:  The image quality was good  Left ventricular size was normal  The TID ratio was 1 0  PERFUSION DEFECTS:  -  There were no perfusion defects  GATED SPECT:  The calculated left ventricular ejection fraction was 65 %  Left ventricular ejection fraction was within normal limits by visual estimate  There was no left ventricular regional abnormality  SUMMARY:  -  Stress results:  There was no chest pain during stress  -  ECG conclusions: The stress ECG was negative for ischemia  -  Perfusion imaging: There were no perfusion defects   -  Gated SPECT: The calculated left ventricular ejection fraction was 65 %  Left ventricular ejection fraction was within normal limits by visual estimate  There was no left ventricular regional abnormality  IMPRESSIONS: Normal study after pharmacologic vasodilation  Myocardial perfusion imaging was normal at rest and with stress  Left ventricular systolic function was normal     Prepared and signed by    Deniz Fournier DO  Signed 2017 16:56:55      Previous Cath/PCI:  No results found for this or any previous visit  ECHO:  Results for orders placed during the hospital encounter of 17    Echo complete with contrast if indicated    Narrative  [address] [place] [address] 35  [place], [address]  [phone]    Transthoracic Echocardiogram  2D, M-mode, Doppler, and Color Doppler    Study date:  05-Sep-2017    Patient: Ronn Duque  MR number: [id number]  Account number: [de-identified]  : 1935  Age: [de-identified] years  Gender: Male  Status: Outpatient  Location: Echo lab  Height: 63 in  Weight: 167 lb  BP: 130/ 92 mmHg    Indications: Shortnessof breath/COPD    Diagnoses: J44 9 - Chronic obstructive pulmonary disease, unspecified, R06 02 - Shortness of breath    Sonographer:  Sophia Peace [facility]  Primary Physician:  Jules Hopson  Referring Physician:  Balwinder Davis MD  Group:  [facility]  Interpreting Physician:  Deniz Fournier DO    SUMMARY    LEFT VENTRICLE:  Systolic function was normal  Ejection fraction was estimated to be 55 %  There were no regional wall motion abnormalities  Wall thickness was mildly increased  Doppler parameters were consistent with abnormal left ventricular relaxation (grade 1 diastolic dysfunction)  TRICUSPID VALVE:  There was trace regurgitation      PULMONARY ARTERIES:  Systolic pressure was mildly increased  Estimated peak pressure was 40 mmHg  HISTORY: PRIOR HISTORY: COPD, HTN, thoracic & abdominal aortic aneurysms    PROCEDURE: The procedure was performed in the echo lab  This was a routine study  The transthoracic approach was used  The study included complete 2D imaging, M-mode, complete spectral Doppler, and color Doppler  Image quality was  adequate  LEFT VENTRICLE: Size was normal  Systolic function was normal  Ejection fraction was estimated to be 55 %  There were no regional wall motion abnormalities  Wall thickness was mildly increased  DOPPLER: Doppler parameters were consistent  with abnormal left ventricular relaxation (grade 1 diastolic dysfunction)  RIGHT VENTRICLE: The size was normal  Systolic function was normal  Wall thickness was normal     LEFT ATRIUM: Size was normal     RIGHT ATRIUM: Size was normal     MITRAL VALVE: Valve structure was normal  There was normal leaflet separation  DOPPLER: The transmitral velocity was within the normal range  There was no evidence for stenosis  There was no regurgitation  AORTIC VALVE: The valve was trileaflet  Leaflets exhibited normal thickness and normal cuspal separation  DOPPLER: Transaortic velocity was within the normal range  There was no evidence for stenosis  There was no regurgitation  TRICUSPID VALVE: The valve structure was normal  There was normal leaflet separation  DOPPLER: The transtricuspid velocity was within the normal range  There was no evidence for stenosis  There was trace regurgitation  PULMONIC VALVE: Leaflets exhibited normal thickness, no calcification, and normal cuspal separation  DOPPLER: The transpulmonic velocity was within the normal range  There was no regurgitation  PERICARDIUM: There was no pericardial effusion  The pericardium was normal in appearance  AORTA: The root exhibited normal size  SYSTEMIC VEINS: IVC: The inferior vena cava was normal in size and course  Respirophasic changes were normal     PULMONARY ARTERY: DOPPLER: Systolic pressure was mildly increased  Estimated peak pressure was 40 mmHg  SYSTEM MEASUREMENT TABLES    2D  %FS: 40 4 %  EF(Teich): 71 4 %  ESV(Teich): 23 ml  IVSd: 1 4 cm  LA Diam: 3 2 cm  LVEF MOD A4C: 54 6 %  LVIDd: 4 2 cm  LVIDs: 2 5 cm  LVPWd: 1 cm  SV(Teich): 57 5 ml    CW  TR Vmax: 3 2 m/s  TR maxP 9 mmHg    [facility] Accredited Echocardiography Laboratory    Prepared and electronically signed by    Fernanda Riley DO  Signed 05-Sep-2017 17:30:31    Results for orders placed during the hospital encounter of 22    Echo complete w/ contrast if indicated    Interpretation Summary  •  Left Ventricle: Left ventricular cavity size is normal  Wall thickness is moderate to severely increased  There is moderate to severe concentric hypertrophy  The left ventricular ejection fraction is 73%  Systolic function is normal  Wall motion is normal  Diastolic function is mildly abnormal, consistent with grade I (abnormal) relaxation  Left atrial filling pressure is normal   •  Right Ventricle: Right ventricular cavity size is mildly dilated  Systolic function is normal   •  Right Atrium: The atrium is mildly dilated  •  Tricuspid Valve: There is mild to moderate regurgitation  •  Pulmonary Artery: The estimated pulmonary artery systolic pressure is 09 0 mmHg  The pulmonary artery systolic pressure is moderately increased      VTE Prophylaxis: Heparin       Assessment/Plan     Assessment:    Diastolic Heart Failure, EF 73%   - Last echo on  shows EF 40%, grade 1 diastolic dysfunction   - Clinically does not appear to be in acute exacerbation, no crackles, no edema, baseline O2 requirement   - EKG normal sinus rhythm with no ST changes, no concern for ACS   - Patient is optimized from a cardiology standpoint for surgery if needed, no changes at this time     HTN   - on losartan and per chart review well controlled usually on monotherapy   - Cont losartan       Thank you for involving us in the care of your patient  Bianca Martinez MD   PGY-1        Epic/ Allscripts/Care Everywhere records reviewed:     ** Please Note: Fluency DirectDictation voice to text software may have been used in the creation of this document   **

## 2022-12-21 NOTE — ED PROVIDER NOTES
History  Chief Complaint   Patient presents with   • Abdominal Pain     Abdominal pain with vomiting  Took antiacid with no relief  RLQ pain and pain related to hernia  33-year-old male with past medical history of dementia, ascending and descending aortic aneurysms, type 2 diabetes mellitus, COPD (on 2L of home oxygen), CHF, hypertension, PVD, CAD, BPH, anemia, diverticulosis, DVT presents to emergency department c/o abdominal pain x 1 day  They also report 2 episodes of vomiting today  He states the pain makes him dizzy  He denies chest or back pain  He states he had 2 normal bowel movements today and is still passing gas  He has history of R inguinal hernia that has required repair twice in the past, most recently in 2019 with partial small bowel obstruction  Abdominal Pain  Pain location:  Periumbilical  Pain quality: sharp    Pain radiates to:  Groin  Pain severity:  Moderate  Duration:  1 day  Timing:  Constant  Progression:  Worsening  Chronicity:  New  Context: previous surgery    Associated symptoms: nausea and vomiting    Associated symptoms: no anorexia, no chest pain, no chills, no constipation, no cough, no diarrhea, no dysuria, no fatigue, no fever, no hematemesis, no hematochezia, no hematuria, no melena and no shortness of breath (per baseline )    Risk factors: multiple surgeries        Prior to Admission Medications   Prescriptions Last Dose Informant Patient Reported?  Taking?   acetylcysteine (MUCOMYST) 10 % nebulizer solution   No No   Sig: Take 4 mL by nebulization every 4 (four) hours   losartan (COZAAR) 25 mg tablet   No No   Sig: Take 1 tablet (25 mg total) by mouth daily   pantoprazole (PROTONIX) 40 mg tablet   Yes No   predniSONE 10 mg tablet   No No   Sig: Take 3 tablets every day   predniSONE 5 mg tablet   No No   Sig: Take 1 tablet (5 mg total) by mouth daily   tiotropium-olodaterol (Stiolto Respimat) 2 5-2 5 MCG/ACT inhaler   No No   Sig: Inhale 2 puffs daily Facility-Administered Medications: None       Past Medical History:   Diagnosis Date   • Acute metabolic encephalopathy 73/67/2842   • Anemia    • Appendicolith    • Ascending aortic aneurysm     3 7   • Asthma    • BPH (benign prostatic hyperplasia)    • CAD (coronary artery disease)     noted on CT scan   • CHF (congestive heart failure) ([facility])    • COPD (chronic obstructive pulmonary disease) ([facility])    • Descending thoracic aortic aneurysm    • Diabetes mellitus ([address] )    • Diverticulosis    • Former tobacco use    • GERD (gastroesophageal reflux disease)    • History of DVT (deep vein thrombosis)     Left leg   • History of transfusion    • Hypertension    • Inguinal hernia     right   • Nephrolithiasis    • Oxygen dependent     2LNC   • Oxygen dependent    • Pneumonia    • Pre-diabetes    • Prostate calculus    • PVD (peripheral vascular disease) ([facility])    • Ulcer    • Varicose vein of leg     b/l       Past Surgical History:   Procedure Laterality Date   • CARDIAC SURGERY     • ESOPHAGOGASTRODUODENOSCOPY     • HERNIA REPAIR Right 1/21/2019    Procedure: REPAIR HERNIA INGUINAL WITH MESH;  Surgeon: Loi Glez MD;  Location: [address] [place] [address] MAIN OR;  Service: General   • INGUINAL HERNIA REPAIR Bilateral    • IR TEVAR  12/27/2018   • GA ENDOVASC TAA REINCL SUBCL N/A 12/27/2018    Procedure: TEVAR - endovascular thoracic aortic aneurysm repair;  Surgeon: Albertus Olszewski Doctor, MD;  Location:  MAIN OR;  Service: Vascular   • THORACIC AORTIC ANEURYSM REPAIR  12/27/2018   • VARICOSE VEIN SURGERY Bilateral     vein stripping       Family History   Problem Relation Age of Onset   • Tuberculosis Mother    • No Known Problems Father    • Cancer Sister    • Diabetes Family    • Hypertension Family      I have reviewed and agree with the history as documented      E-Cigarette/Vaping   • E-Cigarette Use Never User      E-Cigarette/Vaping Substances     Social History     Tobacco Use   • Smoking status: Former     Packs/day: 1 00     Years: 35 00     Pack years: 35 00     Types: Cigarettes     Start date: 1     Quit date:      Years since quittin 9   • Smokeless tobacco: Never   Vaping Use   • Vaping Use: Never used   Substance Use Topics   • Alcohol use: Never   • Drug use: No       Review of Systems   Constitutional: Negative for chills, fatigue and fever  Eyes: Negative for pain and visual disturbance  Respiratory: Negative for cough, chest tightness and shortness of breath (per baseline )  Cardiovascular: Negative for chest pain and leg swelling (patient and family state L leg is per baseline )  Gastrointestinal: Positive for abdominal pain, nausea and vomiting  Negative for anorexia, constipation, diarrhea, hematemesis, hematochezia and melena  Genitourinary: Negative for decreased urine volume, difficulty urinating, dysuria, frequency, hematuria, scrotal swelling and testicular pain  Musculoskeletal: Negative for back pain and neck pain  Skin: Negative for color change and rash  Neurological: Positive for dizziness  Negative for syncope, weakness, numbness and headaches  All other systems reviewed and are negative  Physical Exam  Physical Exam  Vitals and nursing note reviewed  Exam conducted with a chaperone present (SCHUYLER Bettencourt )  Constitutional:       General: He is not in acute distress  Appearance: He is well-developed  He is not toxic-appearing or diaphoretic  HENT:      Head: Normocephalic and atraumatic  Mouth/Throat:      Mouth: Mucous membranes are moist       Pharynx: Oropharynx is clear  Eyes:      General: No scleral icterus  Cardiovascular:      Rate and Rhythm: Normal rate and regular rhythm  Pulses:           Radial pulses are 2+ on the right side and 2+ on the left side  Pulmonary:      Effort: Pulmonary effort is normal  No respiratory distress  Comments: Per baseline   Abdominal:      Palpations: Abdomen is soft  Tenderness:  There is abdominal tenderness in the right lower quadrant, periumbilical area and suprapubic area  There is guarding  There is no rebound  Hernia: A hernia is present  Hernia is present in the right inguinal area  Genitourinary:     Penis: Normal        Testes:         Right: Tenderness or swelling not present  Left: Tenderness or swelling not present  Comments: Large, non-reducible hernia R inguinal hernia  Tender  No skin changes   Skin:     General: Skin is warm and dry  Capillary Refill: Capillary refill takes less than 2 seconds  Findings: No erythema or rash  Neurological:      Mental Status: He is alert  Mental status is at baseline  Psychiatric:      Comments: Patient is unable to demonstrate understanding of medical condition, risks of refusing treatment            Vital Signs  ED Triage Vitals   Temperature Pulse Respirations Blood Pressure SpO2   12/20/22 1858 12/20/22 1858 12/20/22 1858 12/20/22 1858 12/20/22 1858   98 °F (36 7 °C) 80 20 (!) 177/102 (!) 89 %      Temp Source Heart Rate Source Patient Position - Orthostatic VS BP Location FiO2 (%)   12/20/22 1858 12/20/22 1858 12/20/22 1858 12/20/22 1858 --   Oral Monitor Sitting Left arm       Pain Score       12/20/22 1932       7           Vitals:    12/20/22 1858   BP: (!) 177/102   Pulse: 80   Patient Position - Orthostatic VS: Sitting         Visual Acuity      ED Medications  Medications   sodium chloride 0 9 % bolus 250 mL (250 mL Intravenous New Bag 12/20/22 2337)   morphine injection 2 mg (2 mg Intravenous Given 12/20/22 1932)   iohexol (OMNIPAQUE) 350 MG/ML injection (SINGLE-DOSE) 100 mL (100 mL Intravenous Given 12/20/22 2034)       Diagnostic Studies  Results Reviewed     Procedure Component Value Units Date/Time    Lactic acid, plasma [[id number]]  (Normal) Collected: 12/20/22 2005    Lab Status: Final result Specimen: Blood from Arm, Right Updated: 12/20/22 2028     LACTIC ACID 1 3 mmol/L     Narrative:      Result may be elevated if tourniquet was used during collection      Comprehensive metabolic panel [[id number]]  (Abnormal) Collected: 12/20/22 1950    Lab Status: Final result Specimen: Blood from Arm, Right Updated: 12/20/22 2020     Sodium 139 mmol/L      Potassium 4 6 mmol/L      Chloride 95 mmol/L      CO2 41 mmol/L      ANION GAP 3 mmol/L      BUN 28 mg/dL      Creatinine 0 83 mg/dL      Glucose 135 mg/dL      Calcium 9 6 mg/dL      Corrected Calcium 10 1 mg/dL      AST 26 U/L      ALT 22 U/L      Alkaline Phosphatase 68 U/L      Total Protein 6 8 g/dL      Albumin 3 4 g/dL      Total Bilirubin 0 66 mg/dL      eGFR 80 ml/min/1 73sq m     Narrative:      National Kidney Disease Foundation guidelines for Chronic Kidney Disease (CKD):   •  Stage 1 with normal or high GFR (GFR > 90 mL/min/1 73 square meters)  •  Stage 2 Mild CKD (GFR = 60-89 mL/min/1 73 square meters)  •  Stage 3A Moderate CKD (GFR = 45-59 mL/min/1 73 square meters)  •  Stage 3B Moderate CKD (GFR = 30-44 mL/min/1 73 square meters)  •  Stage 4 Severe CKD (GFR = 15-29 mL/min/1 73 square meters)  •  Stage 5 End Stage CKD (GFR <15 mL/min/1 73 square meters)  Note: GFR calculation is accurate only with a steady state creatinine    Lipase [[id number]]  (Normal) Collected: 12/20/22 1950    Lab Status: Final result Specimen: Blood from Arm, Right Updated: 12/20/22 2015     Lipase 94 u/L     CBC and differential [[id number]]  (Abnormal) Collected: 12/20/22 1950    Lab Status: Final result Specimen: Blood from Arm, Right Updated: 12/20/22 2003     WBC 10 72 Thousand/uL      RBC 4 58 Million/uL      Hemoglobin 13 9 g/dL      Hematocrit 48 8 %       fL      MCH 30 3 pg      MCHC 28 5 g/dL      RDW 13 8 %      MPV 9 0 fL      Platelets 144 Thousands/uL      nRBC 0 /100 WBCs      Neutrophils Relative 84 %      Immat GRANS % 1 %      Lymphocytes Relative 8 %      Monocytes Relative 7 %      Eosinophils Relative 0 %      Basophils Relative 0 %      Neutrophils Absolute 8 97 Thousands/µL Immature Grans Absolute 0 05 Thousand/uL      Lymphocytes Absolute 0 88 Thousands/µL      Monocytes Absolute 0 76 Thousand/µL      Eosinophils Absolute 0 04 Thousand/µL      Basophils Absolute 0 02 Thousands/µL                  CTA dissection protocol chest abdomen pelvis w wo contrast   Final Result by Juan Josue MD (12/20 2218)         1  Findings consistent with high-grade small bowel obstruction secondary to intractable bowel within a right inguinal hernia  Recommend emergent surgical consultation  2   Descending thoracic endograft without evidence of aneurysmal dilatation  Stable aneurysmal dilatation of the aortic arch  No dissection  I personally discussed this study with Tanya English on 12/20/2022 at 9:47 PM                      Workstation performed: [id number]                    Procedures  Procedures         ED Course  ED Course as of 12/21/22 0004   e Dec 20, 2022   2035 SpO2(!): 89 %  Patient was placed back on his home oxygen, pulse ox returned to normal  He is on 4L at home  2048 2nd hernia repair was 3 years ago    2058 Patient reports pain is well controlled    2117 Procedure Note: EKG  Date/Time: 12/20/22 9:17 PM   Performed by: Charly Amaya by: Megan Enriquez  ECG interpreted by me, the ED Provider: yes   The EKG demonstrates:  Rate 75 bpm  Rhythm NSR  QTc 428  No ST elevations/depressions  Nonspecific t wave changes in v2, v3    2219 Patient is refusing NG tube at this time  Risks/benefits were explained  He still refused  He is also requesting to leave AMA  Will have conversation to determine capacity to make decisions  2220 CTA dissection protocol chest abdomen pelvis w wo contrast  IMPRESSION:        1  Findings consistent with high-grade small bowel obstruction secondary to intractable bowel within a right inguinal hernia  Recommend emergent surgical consultation  2   Descending thoracic endograft without evidence of aneurysmal dilatation  Stable aneurysmal dilatation of the aortic arch  No dissection  0141 Patient is unable to demonstrate capacity to understand risks of leaving AMA  The risks were explained several times via   Each time he said that he wanted to go home and that there were no risks of him leaving because he will come back  At no point did he demonstrate understanding of diagnosis or risks of leaving  He has a documented history of dementia  Will defer to his spouse  SCHUYLER Sanchez and ED tech Fran Rahana were present for this conversation  As was the patient's son  5 Discussed with spouse, she is agreeable to the patient staying, and being transferred  At this time, she does not want an NG tube, she is able to verbalize risks, she is going to discuss it more with her son, however she reports he previously experienced discomfort with the NG tube and does not want that for him again  She is agreeable to surgery  Wed Dec 21, 2022   0003 Patient signed out to [facility] PA-PAZ pending transfer  SBIRT 20yo+    Flowsheet Row Most Recent Value   SBIRT (23 yo +)    In order to provide better care to our patients, we are screening all of our patients for alcohol and drug use  Would it be okay to ask you these screening questions? Yes Filed at: 12/20/2022 1950   Initial Alcohol Screen: US AUDIT-C     1  How often do you have a drink containing alcohol? 0 Filed at: 12/20/2022 1950   2  How many drinks containing alcohol do you have on a typical day you are drinking? 0 Filed at: 12/20/2022 1950   3a  Male UNDER 65: How often do you have five or more drinks on one occasion? 0 Filed at: 12/20/2022 1950   3b  FEMALE Any Age, or MALE 65+: How often do you have 4 or more drinks on one occassion? 0 Filed at: 12/20/2022 1950   Audit-C Score 0 Filed at: 12/20/2022 1950   ALAN: How many times in the past year have you    Used an illegal drug or used a prescription medication for non-medical reasons?  Susie Morales at: 12/20/2022 [address] [place] [address]  Number of Diagnoses or Management Options  Inguinal hernia with irreducibility  Small bowel obstruction [facility])  Diagnosis management comments: Hypertension noted, no hypertensive urgency/emergency, he took his BP medication late this afternoon  Patient pulse ox WNL for the patient and his known diagnosis of COPD requiring home oxygen  He was placed on his home 2L nasal canula  Periumbilical, suprapubic, RLQ abdominal pain noted with guarding  R inguinal hernia, non-reducible noted, mild tenderness  Tenderness greater midline abdomen  Cannot r/o dissection  CTA dissection study, including pelvis ordered  Mild leukocytosis noted on lab work  abnormal chloride, CO2, BUN similar to patient for patient  Lipase, LFTs within normal notes  Stable hemoglobin  ECG without acute ischemic changes  Aortic findings are stable  No dissection  Patient was found to have high-grade small bowel obstruction secondary to intractable bowel within right inguinal hernia  Patient findings were discussed with Dr Rosalba Cee of Surgery who reviewed chart and due to comorbidities recommended transfer to [place]  Patient has documented history of Dementia  The family does not believe they have legal documentation concerning capacity to make healthcare decisions  Patient was requesting to leave and make an appointment for after [place]  Via interpretor we explained several times his medical condition, diagnosis, and risks of leaving AMA  At not time was he able to demonstrate understanding of the emergent nature of his condition, nor the risks if he were to leave  He continued to insist that there were no risks of him leaving and that he would be fine if he promised to come back after [place]  Discussion was had with his wife and one of his adult children  All of his children are from the same spouse, who they report he is legally  to, today is there anniversary   We explained that due to his lack of capacity to understand his medical condition, the decision falls to her  She was able to demonstrate understanding of this, his current medical condition and the risks of him leaving against medical advice  At this time, she refused NG tube, but she consented to his transfer and is agreeable to surgery  All imaging and/or lab testing discussed with patient and family  family members verbalizes understanding and agrees with plan for transfer  Patient is stable for admission      Portions of the record may have been created with voice recognition software  Occasional wrong word or "sound a like" substitutions may have occurred due to the inherent limitations of voice recognition software  Read the chart carefully and recognize, using context, where substitutions have occurred  Amount and/or Complexity of Data Reviewed  Clinical lab tests: ordered and reviewed  Tests in the radiology section of CPT®: ordered and reviewed        Disposition  Final diagnoses:   Small bowel obstruction ([address] )   Inguinal hernia with irreducibility     Time reflects when diagnosis was documented in both MDM as applicable and the Disposition within this note     Time User Action Codes Description Comment    12/20/2022 10:21 PM Courtney Chapao Add [K56 609] Small bowel obstruction ([address] )     12/20/2022 10:22 PM Courtney Chapao Add [K40 30] Inguinal hernia with irreducibility       ED Disposition     ED Disposition   Transfer to Another Facility-In Network    Condition   --    Date/Time   Tue Dec 20, 2022 11:28 PM    Comment   Isaias Romero should be transferred out to B             MD Documentation    [address] Most Recent Value   Patient Condition The patient has been stabilized such that within reasonable medical probability, no material deterioration of the patient condition or the condition of the unborn child(joselito) is likely to result from the transfer   Reason for Transfer Level of Care needed not available at this facility   Benefits of Transfer Specialized equipment and/or services available at the receiving facility (Include comment)________________________  [surgery]   Risks of Transfer Loss of IV, Potential deterioration of medical condition, Potential for delay in receiving treatment, Increased discomfort during transfer, Possible worsening of condition or death during transfer   Accepting Physician Dr Kvng Asher Name, [address] [facility]    (Name & Tel number) Hannah Nur   Provider Certification General risk, such as traffic hazards, adverse weather conditions, rough terrain or turbulence, possible failure of equipment (including vehicle or aircraft), or consequences of actions of persons outside the control of the transport personnel, Unanticipated needs of medical equipment and personnel during transport, Risk of worsening condition, The possibility of a transport vehicle being unavailable      RN Documentation    [address] Name, [address]  [facility]    (Name & Tel number) Katherine Lara      Follow-up Information    None         Patient's Medications   Discharge Prescriptions    No medications on file       No discharge procedures on file      PDMP Review     None          ED Provider  Electronically Signed by           Lisa Trevino PA-C  12/21/22 0004

## 2022-12-21 NOTE — ED NOTES
Patient was insisting on leaving so Ivone Lowery and us as nursing staff stepped out of the room to allow the family to have a discussion about transfer or leaving to go home  Ivone Lowery did explain the risks to the patient about going home versus staying  Patient wants to be home for Asya, she made him aware if he gets transferred tonight there is a greater potential to be home for Asya if he transfers tonight instead of coming back tomorrow and starting the process all over again        Abraham Gomez RN  12/20/22 5349

## 2022-12-21 NOTE — ED NOTES
This nurse held off on hanging the IV fluids ordered knowing there is a possibility patient will not be staying, Polina Shaw aware of the same       Alessio Lock RN  12/20/22 5179

## 2022-12-21 NOTE — H&P
H&P Exam - General Surgery   Liam Abdi 82 y o  male MRN: [id number]  Unit/Bed#: ED 25 Encounter: [id number]    Assessment/Plan     Assessment:  82 y o  M with multiple medical comorbidities including recent admission for acute hypoxic respiratory failure 2/2 influenza (12/6/22), who presents as a transfer from [facility] with a high grade SBO in setting of an incarcerated right inguinal hernia containing transition point  Afebrile  VSS  Lactic 1 3  WBC 10 7    Plan:  Right inguinal hernia was reduced bedside  Will admit to general surgery service and plan for semi-urgent repair after medical optimization from consultation services including pulmonology in setting of recent acute hypoxic respiratory failure requiring BiPAP earlier this month as well as cardiology in setting of CAD, CHF, aortic arch aneurysmal dilatation  Personal review of CT scan shows a possible mesenteric swirl  He has a benign abdomen with a normal lactic  Will monitor the patient's abdominal exam closely with low threshold to take the patient to the operating room with changes in exam      General surgery admission - SD2  NPO for possible OR today pending clearance from pulmonology and cardiology  NGT for high grade small bowel obstruction   [email or web address]  Serial abdominal exams   Prn anti-hypertensive with low threshold for Cardene gtt given persistent HTN with systolic BP > 388  Prn analgesia/anti-emetics  DVT ppx   Continue home inhaler/nebulizer treatments         History of Present Illness   A [place] interpretor was used for this encounter     HPI:  Liam Abdi is a 82 y o  male with past medical history of diabetes, hypertension, DVT not on AC, GERD, COPD (oxygen dependent), CHF, CAD, PAD, TIA,  descending thoracic aortic aneurysm status post TEVAR, known aortic arch aneurysm followed by cardiology and cardiothoracic surgery, history of bilateral inguinal hernias requiring laparoscopic repair with mesh in several years ago and subsequent open right inguinal hernia repair with mesh in 2019, who initially presented to the [facility] emergency department yesterday evening complaining of abdominal pain, nausea, vomiting and right groin pain  Emergency department work-up revealed an incarcerated right inguinal hernia containing bowel as well as a high-grade small bowel obstruction with a transition point contained within this hernia  Patient was transferred to [place] for general surgery evaluation as well as cardiology and pulmonology services for evaluation given his past medical history  Upon arrival to [place] emergency department, patient is hemodynamically stable and complaining of epigastric abdominal pain  Patient has not had issues with his hernia since his last repair in 2019, until yesterday  Patient does have baseline dementia and majority of history is obtained via the wife  Review of Systems   Constitutional: Negative for chills and fever  HENT: Negative for ear pain and sore throat  Eyes: Negative for pain and visual disturbance  Respiratory: Negative for cough and shortness of breath  Cardiovascular: Negative for chest pain and palpitations  Gastrointestinal: Positive for abdominal pain, nausea and vomiting  Genitourinary: Positive for scrotal swelling  Negative for dysuria and hematuria  Musculoskeletal: Negative for arthralgias and back pain  Skin: Negative for color change and rash  Neurological: Negative for seizures and syncope  All other systems reviewed and are negative        Historical Information   Past Medical History:   Diagnosis Date   • Acute metabolic encephalopathy 41/08/8967   • Anemia    • Appendicolith    • Ascending aortic aneurysm     3 7   • Asthma    • BPH (benign prostatic hyperplasia)    • CAD (coronary artery disease)     noted on CT scan   • CHF (congestive heart failure) ([facility])    • COPD (chronic obstructive pulmonary disease) ([facility])    • Descending thoracic aortic aneurysm    • Diabetes mellitus ([facility] [address] )    • Diverticulosis    • Former tobacco use    • GERD (gastroesophageal reflux disease)    • History of DVT (deep vein thrombosis)     Left leg   • History of transfusion    • Hypertension    • Inguinal hernia     right   • Nephrolithiasis    • Oxygen dependent     2LNC   • Oxygen dependent    • Pneumonia    • Pre-diabetes    • Prostate calculus    • PVD (peripheral vascular disease) ([facility])    • Ulcer    • Varicose vein of leg     b/l     Past Surgical History:   Procedure Laterality Date   • CARDIAC SURGERY     • ESOPHAGOGASTRODUODENOSCOPY     • HERNIA REPAIR Right 2019    Procedure: REPAIR HERNIA INGUINAL WITH MESH;  Surgeon: Maribeth Machado MD;  Location: [address] [place] [address] MAIN OR;  Service: General   • INGUINAL HERNIA REPAIR Bilateral    • IR TEVAR  2018   • NH ENDOVASC TAA REINCL SUBCL N/A 2018    Procedure: TEVAR - endovascular thoracic aortic aneurysm repair;  Surgeon: Fauzia Ortega MD;  Location:  MAIN OR;  Service: Vascular   • THORACIC AORTIC ANEURYSM REPAIR  2018   • VARICOSE VEIN SURGERY Bilateral     vein stripping     Social History   Social History     Substance and Sexual Activity   Alcohol Use Never     Social History     Substance and Sexual Activity   Drug Use No     Social History     Tobacco Use   Smoking Status Former   • Packs/day: 1 00   • Years: 35 00   • Pack years: 35 00   • Types: Cigarettes   • Start date:    • Quit date:    • Years since quittin 9   Smokeless Tobacco Never     E-Cigarette/Vaping   • E-Cigarette Use Never User      E-Cigarette/Vaping Substances     Family History: non-contributory    Meds/Allergies   PTA meds:   Prior to Admission Medications   Prescriptions Last Dose Informant Patient Reported?  Taking?   acetylcysteine (MUCOMYST) 10 % nebulizer solution   No No   Sig: Take 4 mL by nebulization every 4 (four) hours   losartan (COZAAR) 25 mg tablet   No No   Sig: Take 1 tablet (25 mg total) by mouth daily pantoprazole (PROTONIX) 40 mg tablet   Yes No   predniSONE 10 mg tablet   No No   Sig: Take 3 tablets every day   predniSONE 5 mg tablet   No No   Sig: Take 1 tablet (5 mg total) by mouth daily   tiotropium-olodaterol (Stiolto Respimat) 2 5-2 5 MCG/ACT inhaler   No No   Sig: Inhale 2 puffs daily      Facility-Administered Medications: None     Allergies   Allergen Reactions   • Penicillins Hives, Itching and Rash   • Lisinopril Rash     Side pains and rash        Objective   First Vitals:   Blood Pressure: (!) 188/94 (12/21/22 0153)  Pulse: 74 (12/21/22 0153)  Temperature: 98 7 °F (37 1 °C) (12/21/22 0153)  Temp Source: Oral (12/21/22 0153)  Respirations: 16 (12/21/22 0153)  SpO2: 95 % (12/21/22 0153)    Current Vitals:   Blood Pressure: (!) 207/105 (provider aware) (12/21/22 0300)  Pulse: 76 (12/21/22 0300)  Temperature: 98 7 °F (37 1 °C) (12/21/22 0153)  Temp Source: Oral (12/21/22 0153)  Respirations: 16 (12/21/22 0153)  SpO2: 95 % (12/21/22 0300)    No intake or output data in the 24 hours ending 12/21/22 0410    Invasive Devices     Peripheral Intravenous Line  Duration           Peripheral IV 12/20/22 Distal;Right;Upper;Ventral (anterior) Arm <1 day                Physical Exam  Vitals and nursing note reviewed  Constitutional:       General: He is not in acute distress  Appearance: He is well-developed  HENT:      Head: Normocephalic and atraumatic  Eyes:      Conjunctiva/sclera: Conjunctivae normal    Cardiovascular:      Rate and Rhythm: Normal rate and regular rhythm  Heart sounds: No murmur heard  Pulmonary:      Effort: Pulmonary effort is normal  No respiratory distress  Breath sounds: Normal breath sounds  Comments: 3 L NC  Abdominal:      General: There is no distension  Palpations: Abdomen is soft  Tenderness: There is no abdominal tenderness  There is no guarding or rebound  Hernia: A hernia is present   Hernia is present in the right inguinal area (reducible )  Musculoskeletal:      Cervical back: Neck supple  Right lower leg: No edema  Left lower leg: No edema  Skin:     General: Skin is warm and dry  Capillary Refill: Capillary refill takes less than 2 seconds  Neurological:      Mental Status: He is alert  Mental status is at baseline  Psychiatric:         Mood and Affect: Mood normal          Lab Results:   CBC:   Lab Results   Component Value Date    WBC 10 72 (H) 12/20/2022    HGB 13 9 12/20/2022    HCT 48 8 12/20/2022     (H) 12/20/2022     12/20/2022    MCH 30 3 12/20/2022    MCHC 28 5 (L) 12/20/2022    RDW 13 8 12/20/2022    MPV 9 0 12/20/2022    NRBC 0 12/20/2022   , CMP:   Lab Results   Component Value Date    SODIUM 139 12/20/2022    K 4 6 12/20/2022    CL 95 (L) 12/20/2022    CO2 41 (H) 12/20/2022    BUN 28 (H) 12/20/2022    CREATININE 0 83 12/20/2022    CALCIUM 9 6 12/20/2022    AST 26 12/20/2022    ALT 22 12/20/2022    ALKPHOS 68 12/20/2022    EGFR 80 12/20/2022     Imaging: I have personally reviewed pertinent reports  EKG, Pathology, and Other Studies: I have personally reviewed pertinent reports  Code Status: Level 1 - Full Code  Advance Directive and Living Will:      Power of :    POLST:      Counseling / Coordination of Care  Total floor / unit time spent today 30 minutes  Greater than 50% of total time was spent with the patient and / or family counseling and / or coordination of care    A description of the counseling / coordination of care: discussion with patient and surgical team

## 2022-12-21 NOTE — PLAN OF CARE
Problem: Potential for Falls  Goal: Patient will remain free of falls  Description: INTERVENTIONS:  - Educate patient/family on patient safety including physical limitations  - Instruct patient to call for assistance with activity   - Consult OT/PT to assist with strengthening/mobility   - Keep Call bell within reach  - Keep bed low and locked with side rails adjusted as appropriate  - Keep care items and personal belongings within reach  - Initiate and maintain comfort rounds  - Make Fall Risk Sign visible to staff  - Offer Toileting every 2 Hours, in advance of need  - Initiate/Maintain chair alarm  - Obtain necessary fall risk management equipment:   - Apply yellow socks and bracelet for high fall risk patients  - Consider moving patient to room near nurses station  Outcome: Progressing     Problem: PAIN - ADULT  Goal: Verbalizes/displays adequate comfort level or baseline comfort level  Description: Interventions:  - Encourage patient to monitor pain and request assistance  - Assess pain using appropriate pain scale  - Administer analgesics based on type and severity of pain and evaluate response  - Implement non-pharmacological measures as appropriate and evaluate response  - Consider cultural and social influences on pain and pain management  - Notify physician/advanced practitioner if interventions unsuccessful or patient reports new pain  Outcome: Progressing     Problem: INFECTION - ADULT  Goal: Absence or prevention of progression during hospitalization  Description: INTERVENTIONS:  - Assess and monitor for signs and symptoms of infection  - Monitor lab/diagnostic results  - Monitor all insertion sites, i e  indwelling lines, tubes, and drains  - Monitor endotracheal if appropriate and nasal secretions for changes in amount and color  - [place] appropriate cooling/warming therapies per order  - Administer medications as ordered  - Instruct and encourage patient and family to use good hand hygiene technique  - Identify and instruct in appropriate isolation precautions for identified infection/condition  Outcome: Progressing  Goal: Absence of fever/infection during neutropenic period  Description: INTERVENTIONS:  - Monitor WBC    Outcome: Progressing     Problem: SAFETY ADULT  Goal: Patient will remain free of falls  Description: INTERVENTIONS:  - Educate patient/family on patient safety including physical limitations  - Instruct patient to call for assistance with activity   - Consult OT/PT to assist with strengthening/mobility   - Keep Call bell within reach  - Keep bed low and locked with side rails adjusted as appropriate  - Keep care items and personal belongings within reach  - Initiate and maintain comfort rounds  - Make Fall Risk Sign visible to staff  - Offer Toileting every 2 Hours, in advance of need  - Initiate/Maintain alarm  - Obtain necessary fall risk management equipment:   - Apply yellow socks and bracelet for high fall risk patients  - Consider moving patient to room near nurses station  Outcome: Progressing  Goal: Maintain or return to baseline ADL function  Description: INTERVENTIONS:  -  Assess patient's ability to carry out ADLs; assess patient's baseline for ADL function and identify physical deficits which impact ability to perform ADLs (bathing, care of mouth/teeth, toileting, grooming, dressing, etc )  - Assess/evaluate cause of self-care deficits   - Assess range of motion  - Assess patient's mobility; develop plan if impaired  - Assess patient's need for assistive devices and provide as appropriate  - Encourage maximum independence but intervene and supervise when necessary  - Involve family in performance of ADLs  - Assess for home care needs following discharge   - Consider OT consult to assist with ADL evaluation and planning for discharge  - Provide patient education as appropriate  Outcome: Progressing  Goal: Maintains/Returns to pre admission functional level  Description: INTERVENTIONS:  - Perform BMAT or MOVE assessment daily    - Set and communicate daily mobility goal to care team and patient/family/caregiver  - Collaborate with rehabilitation services on mobility goals if consulted  - Perform Range of Motion 3 times a day  - Reposition patient every 2 hours  - Dangle patient 3 times a day  - Stand patient 3 times a day  - Ambulate patient 3 times a day  - Out of bed to chair 3 times a day   - Out of bed for meals 0 times a day  - Out of bed for toileting  - Record patient progress and toleration of activity level   Outcome: Progressing     Problem: DISCHARGE PLANNING  Goal: Discharge to home or other facility with appropriate resources  Description: INTERVENTIONS:  - Identify barriers to discharge w/patient and caregiver  - Arrange for needed discharge resources and transportation as appropriate  - Identify discharge learning needs (meds, wound care, etc )  - Arrange for interpretive services to assist at discharge as needed  - Refer to Case Management Department for coordinating discharge planning if the patient needs post-hospital services based on physician/advanced practitioner order or complex needs related to functional status, cognitive ability, or social support system  Outcome: Progressing     Problem: Knowledge Deficit  Goal: Patient/family/caregiver demonstrates understanding of disease process, treatment plan, medications, and discharge instructions  Description: Complete learning assessment and assess knowledge base    Interventions:  - Provide teaching at level of understanding  - Provide teaching via preferred learning methods  Outcome: Progressing

## 2022-12-21 NOTE — ED NOTES
Corby Burns is at bedside having a discussion with patient and family about the NG tube  Per patient and family they would like him sedated to insert the NG tube  Tracey Mckeon did have a discussion with them and made them aware we are not going to sedate him at the bedside to insert the NG tube, they can have a further discussion with the surgeon at the facility of there destination after transfer  Patient offers no c/o nausea at this time       Claudene Raddle, RN  12/20/22 8032

## 2022-12-21 NOTE — ED NOTES
Nursing supervisor paged for PO Cozaar and made aware patient will be a transfer to another facility with a pick-up at 0100       Laurita Light RN  12/21/22 0922

## 2022-12-21 NOTE — PROCEDURES
Insert PICC line    Date/Time: 12/21/2022 10:13 AM  Performed by: Traci Garza RN  Authorized by: Danis Saenz MD     Patient location:  Bedside  Other Assisting Provider: Yes (comment) (Jemma Garland , [employer] Tech)    Consent:     Consent obtained:  Written (Obtained by provider)    Consent given by:  Spouse (Wife)    Risks discussed:  Arterial puncture, bleeding, infection, incorrect placement, nerve damage and pneumothorax (Discussed by provider and proceduralist)    Alternatives discussed: Discussed by provider  Universal protocol:     Procedure explained and questions answered to patient or proxy's satisfaction: yes      Relevant documents present and verified: yes      Test results available and properly labeled: yes      Radiology Images displayed and confirmed  If images not available, report reviewed: yes      Required blood products, implants, devices, and special equipment available: yes      Site/side marked: yes      Immediately prior to procedure, a time out was called: yes      Patient identity confirmed:  Verbally with patient, arm band, provided demographic data, hospital-assigned identification number and legal responsible patient representative (family) (Pt and wife confirmed identity)  Pre-procedure details:     Hand hygiene: Hand hygiene performed prior to insertion      Sterile barrier technique: All elements of maximal sterile technique followed      Skin preparation:  ChloraPrep    Skin preparation agent: Skin preparation agent completely dried prior to procedure    Indications:     PICC line indications: no peripheral vascular access    Sedation:     Sedation type: None  Given 0 2mg Dilaudid IV prior to procedure  Anesthesia (see MAR for exact dosages):      Anesthesia method:  Local infiltration    Local anesthetic:  Lidocaine 1% w/o epi  Procedure details:     Location:  Bas[place]    Vessel type: vein      Laterality:  Right    Site selection rationale:  Largest, most patent vessel    Approach: percutaneous technique used      Patient position:  Flat    Procedural supplies:  Double lumen    Catheter size:  5 Fr    Landmarks identified: yes      Ultrasound guidance: yes      Ultrasound image availability:  Not saved (23% vessel occupancy)    Sterile ultrasound techniques: Sterile gel and sterile probe covers were used      Number of attempts:  1    Successful placement: yes      Vessel of catheter tip end:  Sherlock 3CG confirmed (OK to use PICC  Sherlock 3CG confirmed placement  Results saved to chart )    Total catheter length (cm):  39    Catheter out on skin (cm):  0    Max flow rate:  999 mL/hr    Arm circumference:  24 5  Post-procedure details:     Post-procedure:  Dressing applied and securement device placed    Assessment:  Blood return through all ports and free fluid flow    Post-procedure complications: none      Patient tolerance of procedure:   Tolerated well, no immediate complications

## 2022-12-21 NOTE — EMTALA/ACUTE CARE TRANSFER
Occupational Health Nurse/MA/Tech visit only     [facility] EMERGENCY DEPARTMENT  [address] [facility] [address]  [phone]  Dept: [phone]      EMTALA TRANSFER CONSENT    NAME Jackeline Duron                                         1935                              MRN [id number]    I have been informed of my rights regarding examination, treatment, and transfer   by Dr Wing Ronnie MD    Benefits: Specialized equipment and/or services available at the receiving facility (Include comment)________________________ (surgery)    Risks: Loss of IV, Potential deterioration of medical condition, Potential for delay in receiving treatment, Increased discomfort during transfer, Possible worsening of condition or death during transfer      Consent for Transfer:  I acknowledge that my medical condition has been evaluated and explained to me by the emergency department physician or other qualified medical person and/or my attending physician, who has recommended that I be transferred to the service of  Accepting Physician: Dr Linus Faust at [address] Name, [address] : [facility]  The above potential benefits of such transfer, the potential risks associated with such transfer, and the probable risks of not being transferred have been explained to me, and I fully understand them  The doctor has explained that, in my case, the benefits of transfer outweigh the risks  I agree to be transferred  I authorize the performance of emergency medical procedures and treatments upon me in both transit and upon arrival at the receiving facility  Additionally, I authorize the release of any and all medical records to the receiving facility and request they be transported with me, if possible  I understand that the safest mode of transportation during a medical emergency is an ambulance and that the Hospital advocates the use of this mode of transport   Risks of traveling to the receiving facility by car, including absence of medical control, life sustaining equipment, such as oxygen, and medical personnel has been explained to me and I fully understand them  (TANG CORRECT BOX BELOW)  [  ]  I consent to the stated transfer and to be transported by ambulance/helicopter  [  ]  I consent to the stated transfer, but refuse transportation by ambulance and accept full responsibility for my transportation by car  I understand the risks of non-ambulance transfers and I exonerate the Hospital and its staff from any deterioration in my condition that results from this refusal     X___________________________________________    DATE  22  TIME________  Signature of patient or legally responsible individual signing on patient behalf           RELATIONSHIP TO PATIENT_________________________          Provider Certification    NAME Sarah Crane                                        [facility] 1935                              MRN [id number]    A medical screening exam was performed on the above named patient  Based on the examination:    Condition Necessitating Transfer The primary encounter diagnosis was Small bowel obstruction ([address] )  A diagnosis of Inguinal hernia with irreducibility was also pertinent to this visit      Patient Condition: The patient has been stabilized such that within reasonable medical probability, no material deterioration of the patient condition or the condition of the unborn child(joselito) is likely to result from the transfer    Reason for Transfer: Level of Care needed not available at this facility    Transfer Requirements: Facility [facility]   · Space available and qualified personnel available for treatment as acknowledged by Kiko Busby  · Agreed to accept transfer and to provide appropriate medical treatment as acknowledged by       Dr Alice Miller  · Appropriate medical records of the examination and treatment of the patient are provided at the time of transfer   [address] _______  · Transfer will be performed by qualified personnel from    and appropriate transfer equipment as required, including the use of necessary and appropriate life support measures  Provider Certification: I have examined the patient and explained the following risks and benefits of being transferred/refusing transfer to the patient/family:  General risk, such as traffic hazards, adverse weather conditions, rough terrain or turbulence, possible failure of equipment (including vehicle or aircraft), or consequences of actions of persons outside the control of the transport personnel, Unanticipated needs of medical equipment and personnel during transport, Risk of worsening condition, The possibility of a transport vehicle being unavailable      Based on these reasonable risks and benefits to the patient and/or the unborn child(joselito), and based upon the information available at the time of the patient’s examination, I certify that the medical benefits reasonably to be expected from the provision of appropriate medical treatments at another medical facility outweigh the increasing risks, if any, to the individual’s medical condition, and in the case of labor to the unborn child, from effecting the transfer      X____________________________________________ DATE 12/20/22        TIME_______      ORIGINAL - SEND TO MEDICAL RECORDS   COPY - SEND WITH PATIENT DURING TRANSFER

## 2022-12-22 LAB
ANION GAP SERPL CALCULATED.3IONS-SCNC: 1 MMOL/L (ref 4–13)
BASOPHILS # BLD AUTO: 0.03 THOUSANDS/ÂΜL (ref 0–0.1)
BASOPHILS NFR BLD AUTO: 0 % (ref 0–1)
BUN SERPL-MCNC: 19 MG/DL (ref 5–25)
CALCIUM SERPL-MCNC: 8.6 MG/DL (ref 8.3–10.1)
CHLORIDE SERPL-SCNC: 99 MMOL/L (ref 96–108)
CO2 SERPL-SCNC: 41 MMOL/L (ref 21–32)
CREAT SERPL-MCNC: 0.69 MG/DL (ref 0.6–1.3)
EOSINOPHIL # BLD AUTO: 0.35 THOUSAND/ÂΜL (ref 0–0.61)
EOSINOPHIL NFR BLD AUTO: 4 % (ref 0–6)
ERYTHROCYTE [DISTWIDTH] IN BLOOD BY AUTOMATED COUNT: 14.1 % (ref 11.6–15.1)
GFR SERPL CREATININE-BSD FRML MDRD: 86 ML/MIN/1.73SQ M
GLUCOSE SERPL-MCNC: 109 MG/DL (ref 65–140)
HCT VFR BLD AUTO: 44.8 % (ref 36.5–49.3)
HGB BLD-MCNC: 12.7 G/DL (ref 12–17)
IMM GRANULOCYTES # BLD AUTO: 0.02 THOUSAND/UL (ref 0–0.2)
IMM GRANULOCYTES NFR BLD AUTO: 0 % (ref 0–2)
LYMPHOCYTES # BLD AUTO: 1.16 THOUSANDS/ÂΜL (ref 0.6–4.47)
LYMPHOCYTES NFR BLD AUTO: 14 % (ref 14–44)
MCH RBC QN AUTO: 30.5 PG (ref 26.8–34.3)
MCHC RBC AUTO-ENTMCNC: 28.3 G/DL (ref 31.4–37.4)
MCV RBC AUTO: 108 FL (ref 82–98)
MONOCYTES # BLD AUTO: 0.81 THOUSAND/ÂΜL (ref 0.17–1.22)
MONOCYTES NFR BLD AUTO: 10 % (ref 4–12)
NEUTROPHILS # BLD AUTO: 5.89 THOUSANDS/ÂΜL (ref 1.85–7.62)
NEUTS SEG NFR BLD AUTO: 72 % (ref 43–75)
NRBC BLD AUTO-RTO: 0 /100 WBCS
PLATELET # BLD AUTO: 188 THOUSANDS/UL (ref 149–390)
PMV BLD AUTO: 8.9 FL (ref 8.9–12.7)
POTASSIUM SERPL-SCNC: 3.8 MMOL/L (ref 3.5–5.3)
RBC # BLD AUTO: 4.16 MILLION/UL (ref 3.88–5.62)
SODIUM SERPL-SCNC: 141 MMOL/L (ref 135–147)
WBC # BLD AUTO: 8.26 THOUSAND/UL (ref 4.31–10.16)

## 2022-12-22 RX ORDER — DOCUSATE SODIUM 100 MG/1
100 CAPSULE, LIQUID FILLED ORAL 2 TIMES DAILY
Status: DISCONTINUED | OUTPATIENT
Start: 2022-12-22 | End: 2022-12-23 | Stop reason: HOSPADM

## 2022-12-22 RX ORDER — SENNOSIDES 8.6 MG
2 TABLET ORAL
Status: DISCONTINUED | OUTPATIENT
Start: 2022-12-22 | End: 2022-12-23 | Stop reason: HOSPADM

## 2022-12-22 RX ADMIN — IPRATROPIUM BROMIDE 0.5 MG: 0.5 SOLUTION RESPIRATORY (INHALATION) at 07:41

## 2022-12-22 RX ADMIN — GABAPENTIN 100 MG: 100 CAPSULE ORAL at 21:10

## 2022-12-22 RX ADMIN — IPRATROPIUM BROMIDE 0.5 MG: 0.5 SOLUTION RESPIRATORY (INHALATION) at 20:21

## 2022-12-22 RX ADMIN — FORMOTEROL FUMARATE DIHYDRATE 20 MCG: 20 SOLUTION RESPIRATORY (INHALATION) at 07:41

## 2022-12-22 RX ADMIN — ENOXAPARIN SODIUM 40 MG: 40 INJECTION SUBCUTANEOUS at 08:44

## 2022-12-22 RX ADMIN — DOCUSATE SODIUM 100 MG: 100 CAPSULE, LIQUID FILLED ORAL at 17:11

## 2022-12-22 RX ADMIN — SENNOSIDES 17.2 MG: 8.6 TABLET, FILM COATED ORAL at 21:10

## 2022-12-22 RX ADMIN — IPRATROPIUM BROMIDE 0.5 MG: 0.5 SOLUTION RESPIRATORY (INHALATION) at 13:06

## 2022-12-22 RX ADMIN — FORMOTEROL FUMARATE DIHYDRATE 20 MCG: 20 SOLUTION RESPIRATORY (INHALATION) at 20:22

## 2022-12-22 NOTE — PLAN OF CARE
Problem: PAIN - ADULT  Goal: Verbalizes/displays adequate comfort level or baseline comfort level  Description: Interventions:  - Encourage patient to monitor pain and request assistance  - Assess pain using appropriate pain scale  - Administer analgesics based on type and severity of pain and evaluate response  - Implement non-pharmacological measures as appropriate and evaluate response  - Consider cultural and social influences on pain and pain management  - Notify physician/advanced practitioner if interventions unsuccessful or patient reports new pain  12/21/2022 2243 by Alease Severs, RN  Outcome: Progressing  12/21/2022 2243 by Alease Severs, RN  Outcome: Progressing

## 2022-12-22 NOTE — PROGRESS NOTES
Cardiology Team 2 Progress Note - Liam Abdi 82 y o  male MRN: [id number]    Unit/Bed#: [facility] 429-01 Encounter: [id number]          Subjective:   Patient seen and examined  No significant events overnight  Denies lightheadedness, dizziness, syncope, headache, vision changes, diaphoresis, chest pain, palpitations, shortness of breath, PND, orthopnea, nausea, vomiting, abdominal pain or lower extremity edema  Patient is eating and drinking, has had normal bowel movements and inguinal hernia has been reduced     Hospital Course:   Liam Abdi is a 82y o  year old male with a hx of CAD, HFpEF (EF of 73% on 4/22), descending thoracic aortic aneurysm s/p TEVAR in 2020, COPD on 2L baseline O2, HTN on losartan, HLD, and dementia who presented with concern for SBO and incarcerated inguinal hernia  Cardiology consulted for medical clearance for surgery       Vitals: Blood pressure 122/57, pulse 68, temperature 98 3 °F (36 8 °C), resp  rate 17, height 5' 3" (1 6 m), weight 59 4 kg (130 lb 15 3 oz), SpO2 99 %  , Body mass index is 23 2 kg/m² ,   Orthostatic Blood Pressures    Flowsheet Row Most Recent Value   Blood Pressure 122/57 filed at 12/22/2022 0927   Patient Position - Orthostatic VS Sitting filed at 12/22/2022 0239            Intake/Output Summary (Last 24 hours) at 12/22/2022 0917  Last data filed at 12/22/2022 0910  Gross per 24 hour   Intake 1816 3 ml   Output 900 ml   Net 916 3 ml       Review of System:  Review of system was conducted and was negative except for as stated in the subjective course      Physical Exam:    GEN: Liam Abdi appears well, alert and oriented x 3, pleasant and cooperative   HEENT:  Normocephalic, atraumatic, anicteric, moist mucous membranes  NECK: No JVD or carotid bruits   HEART: regular rhythm, regular rate, normal S1 and S2, no murmurs, clicks, gallops or rubs   LUNGS: Clear to auscultation bilaterally; no wheezes, rales, or rhonchi; respiration nonlabored ABDOMEN:  Normoactive bowel sounds, soft, no tenderness, no distention  EXTREMITIES: peripheral pulses palpable; no edema  NEURO: no gross focal findings; cranial nerves grossly intact   SKIN:  Dry, intact, warm to touch      Current Facility-Administered Medications:   •  enoxaparin (LOVENOX) subcutaneous injection 40 mg, 40 mg, Subcutaneous, Daily, Bibiana Johnson MD, 40 mg at 12/22/22 5821  •  formoterol (PERFOROMIST) nebulizer solution 20 mcg, 20 mcg, Nebulization, Q12H, Erica Sosa MD, 20 mcg at 12/22/22 0741  •  gabapentin (NEURONTIN) capsule 100 mg, 100 mg, Oral, HS, Bibiana Johnson MD, 100 mg at 12/21/22 2200  •  hydrALAZINE (APRESOLINE) injection 5 mg, 5 mg, Intravenous, Q6H PRN, Bibiana Johnson MD, 5 mg at 12/21/22 0320  •  HYDROmorphone HCl (DILAUDID) injection 0 2 mg, 0 2 mg, Intravenous, Q4H PRN, Bibiana Johnson MD  •  influenza vaccine, high-dose (FLUZONE HIGH-DOSE) IM injection RENATA 0 7 mL, 0 7 mL, Intramuscular, Once, Erica Sosa MD  •  ipratropium (ATROVENT) 0 02 % inhalation solution 0 5 mg, 0 5 mg, Nebulization, TID, Alta Vilchis MD, 0 5 mg at 12/22/22 0741  •  labetalol (NORMODYNE) injection 10 mg, 10 mg, Intravenous, Q4H PRN, Bibiana Johnson MD, 10 mg at 12/21/22 0501  •  naloxone (NARCAN) 0 04 mg/mL syringe 0 04 mg, 0 04 mg, Intravenous, Q1MIN PRN, Bibiana Johnson MD  •  ondansetron [facility]) injection 4 mg, 4 mg, Intravenous, Q6H PRN, Bibiana Johnson MD, 4 mg at 12/21/22 5402  •  oxyCODONE (ROXICODONE) IR tablet 2 5 mg, 2 5 mg, Oral, Q4H PRN, Bibiana Johnson MD  •  oxyCODONE (ROXICODONE) IR tablet 5 mg, 5 mg, Oral, Q4H PRN, Bibiana Johnson MD    Labs & Results:          Results from last 7 days   Lab Units 12/22/22  0507 12/21/22  0505 12/20/22  1950   POTASSIUM mmol/L 3 8 4 6 4 6   CO2 mmol/L 41* 42* 41*   CHLORIDE mmol/L 99 97 95*   BUN mg/dL 19 22 28*   CREATININE mg/dL 0  69 0 79 0 83     Results from last 7 days   Lab Units 12/21/22  0505 12/20/22  1950   HEMOGLOBIN g/dL 15 2 13 9   HEMATOCRIT % 51 6* 48 8   PLATELETS Thousands/uL 212 199           Telemetry:   Personally reviewed by Beka Morin MD:     Imaging: No new imagine       VTE Prophylaxis: Enoxaparin (Lovenox)        Assessment:  Principal Problem:    Inguinal hernia, incarcerated      Diastolic Heart Failure, EF 73%   - Last echo on 4/22 shows EF 18%, grade 1 diastolic dysfunction   - Clinically looks well with no signs of acute exacerbation and is eating and drinking normally   - EKG normal sinus rhythm with no ST changes, no concern for ACS   - Patient is optimized from a cardiology standpoint for surgery if needed, cardiology will sign off at this time please contact with questions     HTN   - on losartan and per chart review well controlled usually on monotherapy   - Cont losartan     SBO   - CT Abd shows high grade SBO   - Today patient has bowel movements and is eating well   - According to surgery may not need surgery at this time        Thank you for involving us in the care of your patient  Beka Morin MD  PGY-1      Epic/ Allscripts/Care Everywhere records reviewed:     ** Please Note: Fluency DirectDictation voice to text software may have been used in the creation of this document   **

## 2022-12-22 NOTE — PROGRESS NOTES
Progress Note - General Surgery   [facility] 82 y o  male MRN: [id number]  Unit/Bed#: [facility] 429-01 Encounter: [id number]    Assessment:  Patient is a 82 y o  male  w/ multiple comorbidities w/ incarcerated inguinal hernia s/p bedside reduction  Plan:  • Unlikely to receive surgery this admission unless worsening  • Clears, adv diet  I/Os  DVT ppx  Pain/Nausea Control  OOB as tolerated  • Incentive Spirometry  • Please TigerText on call Red Surgery or Acute Care Surgery Floor Call with any questions     Subjective/Objective     Subjective:   No acute events overnight  Tolerating diet  No N/V  With bowel function  Pertinent review of systems as above  All other review of systems negative  Objective:    Blood pressure 147/75, pulse 67, temperature 98 1 °F (36 7 °C), temperature source Oral, resp  rate 14, height 5' 3" (1 6 m), weight 59 4 kg (130 lb 15 3 oz), SpO2 98 %  ,Body mass index is 23 2 kg/m²  Intake/Output Summary (Last 24 hours) at 12/22/2022 0710  Last data filed at 12/22/2022 0640  Gross per 24 hour   Intake 1666 3 ml   Output 675 ml   Net 991 3 ml       Invasive Devices     Peripherally Inserted Central Catheter Line  Duration           PICC Line 56/16/29 Right Basilic <1 day          Peripheral Intravenous Line  Duration           Peripheral IV 12/21/22 Right;Ventral (anterior) Forearm <1 day                Physical Exam:   Gen:  NAD  HEENT: NCAT  MMM  CV: well perfused  Lungs: Normal respiratory effort  Abd: soft, nt/nd, no hernia recurrence  Skin: warm/ dry  Extremities: no peripheral edema, no clubbing or cyanosis  Neuro:  AxO x3      Results from last 7 days   Lab Units 12/21/22  0505 12/20/22 1950   WBC Thousand/uL 11 01* 10 72*   HEMOGLOBIN g/dL 15 2 13 9   HEMATOCRIT % 51 6* 48 8   PLATELETS Thousands/uL 212 199     Results from last 7 days   Lab Units 12/22/22  0507 12/21/22  0505 12/20/22 1950   POTASSIUM mmol/L 3 8 4 6 4 6   CHLORIDE mmol/L 99 97 95*   CO2 mmol/L 41* 42* 41*   BUN mg/dL 19 22 28*   CREATININE mg/dL 0 69 0 79 0 83   CALCIUM mg/dL 8 6 9 8 9 6            I have personally reviewed pertinent films in PACS      Medications:   Scheduled Meds:  Current Facility-Administered Medications   Medication Dose Route Frequency Provider Last Rate   • enoxaparin  40 mg Subcutaneous Daily Stefany Haynes MD     • formoterol  20 mcg Nebulization Q12H Kelsy Bui MD     • gabapentin  100 mg Oral HS Stefany Haynes MD     • hydrALAZINE  5 mg Intravenous Q6H PRN Stefany Haynes MD     • HYDROmorphone  0 2 mg Intravenous Q4H PRN Stefany Haynes MD     • influenza vaccine  0 7 mL Intramuscular Once Kelsy Bui MD     • ipratropium  0 5 mg Nebulization TID rAt Ch MD     • labetalol  10 mg Intravenous Q4H PRN Stefany Haynes MD     • naloxone  0 04 mg Intravenous Q1MIN PRN Stefany Haynes MD     • ondansetron  4 mg Intravenous Q6H PRN Stefany Haynes MD     • oxyCODONE  2 5 mg Oral Q4H PRN Stefany Haynes MD     • oxyCODONE  5 mg Oral Q4H PRN Stefany Haynes MD       Continuous Infusions:   PRN Meds:  hydrALAZINE, 5 mg, Q6H PRN  HYDROmorphone, 0 2 mg, Q4H PRN  labetalol, 10 mg, Q4H PRN  naloxone, 0 04 mg, Q1MIN PRN  ondansetron, 4 mg, Q6H PRN  oxyCODONE, 2 5 mg, Q4H PRN  oxyCODONE, 5 mg, Q4H PRN      VTE Pharmacologic Prophylaxis: Enoxaparin (Lovenox)  VTE Mechanical Prophylaxis: sequential compression device

## 2022-12-22 NOTE — PROGRESS NOTES
PULMONOLOGY PROGRESS NOTE     Name: Shola Puente   Age & Sex: 82 y o  male   MRN: [id number]  Unit/Bed#: [facility] 429-01   Encounter: [id number]    PATIENT INFORMATION     Name: Shola Puente   Age & Sex: 82 y o  male   MRN: [id number]  Hospital Stay Days: 1    ASSESSMENT/PLAN     Assessment:   1  Chronic respiratory failure with hypoxia, requiring baseline 2L O2 NC at rest, 3L O2 NC with exertion  2  Oxygen-dependent COPD, baseline  3  History of HFpEF,  managed on home Lasix 20 mg daily; TTE in 3/2022 with EF 39%, rate 1 diastolic dysfunction, acute valvular dysfunction, moderate-severe concentric hypertrophy; currently without acute exacerbation  4  Hypertension; controlled on losartan  5   Small bowel obstruction, with incarcerated right inguinal hernia containing transition point; reduced at bedside on admission     Plan:  · Chronically on 2L O2 NC at rest, 3L O2 NC with exertion 2/2 COPD; continues to be without SOB; poor airway entry in all lung fields but no wheezing exam   Presently maintained on home supplemental O2 requirements  • Recently admitted (12/4-12/6) for acute on chronic respiratory failure 2/2 COPD exacerbation vs influenza virus; required treatment with BiPAP, and was discharged on azithromycin and steroid taper  • Clinical presentation not consistent with acute respiratory failure or COPD exacerbation at this time  • Titrate O2 to maintain O2 sats at 88-92%  • Continue nebulized Atrovent and formoterol for improved medication compliance and effectiveness; recommend switching home inhalers to Rx nebulized solutions at time of discharge  • Frequent nocturnal O2 desaturations noted by RN/family; consider increasing resting home O2 to 3L at time of discharge, and an outpatient sleep study to rule out JAY  • Further management as per primary team  • Patient is cleared from pulmonary standpoint to proceed with inguinal hernia repair, if needed, during present hospital course    SUBJECTIVE     Patient seen and examined  Per RN, patient noted to desat into the 70s during sleep last night  Per son, patient does frequently desaturate into the 70s to low 80s during sleep at home  States patient has never been evaluated for JAY in the past does not use PAP machine at home  Patient currently sitting up, resting comfortably  Denies any abdominal pain  Denies shortness of breath or chest pain  Tolerating p o  No other concerns reported  OBJECTIVE     Vitals:    22 0743 22 1140 22 1152 22 1306   BP:  99/60     BP Location:       Pulse:   84    Resp:  18     Temp:       TempSrc:       SpO2: 99% 94%  96%   Weight:       Height:          Temperature:   Temp (24hrs), Av 4 °F (36 9 °C), Min:98 1 °F (36 7 °C), Max:98 7 °F (37 1 °C)    Temperature: 98 3 °F (36 8 °C)  Intake & Output:  I/O        0701   0700  0701   0700  0701   0700    P  O   480 420    I V  (mL/kg)  1186 3 (20) 150 (2 5)    Total Intake(mL/kg)  1666 3 (28 1) 570 (9 6)    Urine (mL/kg/hr)  675 (0 5) 325 (0 9)    Total Output  675 325    Net  +991 3 +245           Unmeasured Urine Occurrence  2 x     Unmeasured Stool Occurrence  1 x         Weights:   IBW (Ideal Body Weight): 56 9 kg    Body mass index is 23 2 kg/m²  Weight (last 2 days)     Date/Time Weight    22 1115 59 4 (130 95)        Physical Exam  Vitals and nursing note reviewed  Constitutional:       General: He is not in acute distress  Appearance: He is well-developed  HENT:      Head: Normocephalic and atraumatic  Mouth/Throat:      Mouth: Mucous membranes are moist    Eyes:      Conjunctiva/sclera: Conjunctivae normal    Cardiovascular:      Rate and Rhythm: Normal rate and regular rhythm  Heart sounds: No murmur heard  Pulmonary:      Effort: Pulmonary effort is normal  No respiratory distress  Breath sounds: Normal breath sounds  No wheezing or rhonchi     Abdominal: Palpations: Abdomen is soft  Tenderness: There is no abdominal tenderness  Musculoskeletal:         General: No swelling  Cervical back: Neck supple  Right lower leg: No edema  Left lower leg: No edema  Skin:     General: Skin is warm and dry  Capillary Refill: Capillary refill takes less than 2 seconds  Neurological:      Mental Status: He is alert  Mental status is at baseline  Psychiatric:         Mood and Affect: Mood normal            LABORATORY DATA     Labs: I have personally reviewed pertinent reports  Results from last 7 days   Lab Units 12/22/22  0927 12/21/22  0505 12/20/22  1950   WBC Thousand/uL 8 26 11 01* 10 72*   HEMOGLOBIN g/dL 12 7 15 2 13 9   HEMATOCRIT % 44 8 51 6* 48 8   PLATELETS Thousands/uL 188 212 199   NEUTROS PCT % 72 78* 84*   MONOS PCT % 10 8 7      Results from last 7 days   Lab Units 12/22/22  0507 12/21/22  0505 12/20/22  1950   POTASSIUM mmol/L 3 8 4 6 4 6   CHLORIDE mmol/L 99 97 95*   CO2 mmol/L 41* 42* 41*   BUN mg/dL 19 22 28*   CREATININE mg/dL 0 69 0 79 0 83   CALCIUM mg/dL 8 6 9 8 9 6   ALK PHOS U/L  --  71 68   ALT U/L  --  27 22   AST U/L  --  24 26     Results from last 7 days   Lab Units 12/21/22  0505   MAGNESIUM mg/dL 2 2     Results from last 7 days   Lab Units 12/21/22  0505   PHOSPHORUS mg/dL 4 8*          Results from last 7 days   Lab Units 12/21/22  0505   LACTIC ACID mmol/L 1 9             ABG:       Micro:         IMAGING & DIAGNOSTIC TESTING     Radiology Results: I have personally reviewed pertinent reports  No results found  Other Diagnostic Testing: I have personally reviewed pertinent reports      ACTIVE MEDICATIONS     Current Facility-Administered Medications   Medication Dose Route Frequency   • enoxaparin (LOVENOX) subcutaneous injection 40 mg  40 mg Subcutaneous Daily   • formoterol (PERFOROMIST) nebulizer solution 20 mcg  20 mcg Nebulization Q12H   • gabapentin (NEURONTIN) capsule 100 mg  100 mg Oral HS   • hydrALAZINE (APRESOLINE) injection 5 mg  5 mg Intravenous Q6H PRN   • HYDROmorphone HCl (DILAUDID) injection 0 2 mg  0 2 mg Intravenous Q4H PRN   • influenza vaccine, high-dose (FLUZONE HIGH-DOSE) IM injection RENATA 0 7 mL  0 7 mL Intramuscular Once   • ipratropium (ATROVENT) 0 02 % inhalation solution 0 5 mg  0 5 mg Nebulization TID   • labetalol (NORMODYNE) injection 10 mg  10 mg Intravenous Q4H PRN   • naloxone (NARCAN) 0 04 mg/mL syringe 0 04 mg  0 04 mg Intravenous Q1MIN PRN   • ondansetron (ZOFRAN) injection 4 mg  4 mg Intravenous Q6H PRN   • oxyCODONE (ROXICODONE) IR tablet 2 5 mg  2 5 mg Oral Q4H PRN   • oxyCODONE (ROXICODONE) IR tablet 5 mg  5 mg Oral Q4H PRN       VTE Pharmacologic Prophylaxis: Enoxaparin (Lovenox)  VTE Mechanical Prophylaxis: sequential compression device      Disclaimer: Portions of the record may have been created with voice recognition software  Occasional wrong word or "sound a like" substitutions may have occurred due to the inherent limitations of voice recognition software  Careful consideration should be taken to recognize, using context, where substitutions have occurred        ----------------------------------------------------  Laurelyn Harada, DO, MS, PGY-1  Internal Medicine Residency   [address] [place] [address]

## 2022-12-22 NOTE — UTILIZATION REVIEW
Initial Clinical Review    Admission: Date/Time/Statement:   Admission Orders (From admission, onward)     Ordered        12/21/22 0331  Inpatient Admission  Once                      Orders Placed This Encounter   Procedures   • Inpatient Admission     Standing Status:   Standing     Number of Occurrences:   1     Order Specific Question:   Level of Care     Answer:   Level 2 Stepdown / HOT [14]     Order Specific Question:   Estimated length of stay     Answer:   More than 2 Midnights     Order Specific Question:   Certification     Answer:   I certify that inpatient services are medically necessary for this patient for a duration of greater than two midnights  See H&P and MD Progress Notes for additional information about the patient's course of treatment  ED Arrival Information     Expected   12/20/2022 23:35    Arrival   12/21/2022 01:50    Acuity   Urgent            Means of arrival   Ambulance    Escorted by   CARLOS Jeter)    Service   Surgery-General    Admission type   Emergency            Arrival complaint   sbo           Chief Complaint   Patient presents with   • Abdominal Pain     Transfer from [facility] for surgery consult  RQ abd pain +n/v       Initial Presentation: 82 y o  male , presented to the ED @ [facility], transferred to [facility], higher level of care via EMS  Admitted as Inpatient due to  bowel obstruction secondary to incarcerated hernia  Date: 12/21/2022  Right inguinal hernia was reduced bedside [facility])  Will admit to general surgery service and plan for semi-urgent repair after medical optimization from consultation services including pulmonology in setting of recent acute hypoxic respiratory failure requiring BiPAP earlier this month as well as cardiology in setting of CAD, CHF, aortic arch aneurysmal dilatation    CT scan shows a possible mesenteric swirl  He has a benign abdomen with a normal lactic   Will monitor the patient's abdominal exam closely with low threshold to take the patient to the operating room with changes in exam    NPO for possible OR today pending clearance from pulmonology and cardiology  NGT for high grade small bowel obstruction  [email or web address]  Serial abdominal exams  Prn anti-hypertensive with low threshold for Cardene gtt given persistent HTN with systolic BP > 912  Prn analgesia/anti-emetics  DVT ppx  Continue  inhaler/nebulizer treatments  12/21/2022  Consult Cardio:  Assessment:   1  Chronic respiratory failure with hypoxia, requiring baseline 2L O2 at rest, 3L O2 with exertion  2  Oxygen-dependent COPD  3  Heart failure with preserved EF, home Lasix 20 mg daily; TTE in 3/2022 with EF 70%, rate 1 diastolic dysfunction, acute valvular dysfunction, moderate-severe concentric hypertrophy  4  Hypertension; controlled on losartan  5  Small bowel obstruction, with incarcerated right inguinal hernia containing transition point   Plan:  14-year-old male with multiple comorbidities who presented with small bowel obstruction secondary to incarcerated hernia at [facility], subsequently transferred to [facility] for further care and preop medical optimization prior to proceeding with hernia repair during this hospital course; right inguinal hernia reduced in the ED     • Chronically on 2L O2 NC at rest, 3L O2 NC with exertion 2/2 COPD  • Recently admitted (12/4-12/6) for acute on chronic respiratory failure 2/2 COPD exacerbation vs influenza virus; required treatment with BiPAP, and was discharged on azithromycin and steroid taper  • Denies any shortness of breath on admission; no respiratory distress on arrival, poor airway entry in all lung fields but no wheezing exam   Presently maintained on home supplemental O2 requirements   • Clinical presentation not consistent with acute respiratory failure or COPD exacerbation at this time  • Will switch oral inhalers to nebulized Atrovent and formoterol for improved medication compliance and effectiveness  • Titrate O2 to maintain O2 sats at 88-92%  • Maintain n p o  preparation for surgical intervention to treat SBO, pending cardiology clearance  • Further management as per primary team  Patient is cleared from pulmonary standpoint to proceed with inguinal hernia repair, pending cardiology clearance    12/21/2022  Consult Pulmonary:  Assessment:     Diastolic Heart Failure, EF 73%   - Last echo on 4/22 shows EF 78%, grade 1 diastolic dysfunction   - Clinically does not appear to be in acute exacerbation, no crackles, no edema, baseline O2 requirement   - EKG normal sinus rhythm with no ST changes, no concern for ACS   - Patient is optimized from a cardiology standpoint for surgery if needed, no changes at this time    HTN   - on losartan and per chart review well controlled usually on monotherapy   - Cont losartan      Day 2: 12/22/2022  W/ multiple comorbidities w/ incarcerated inguinal hernia s/p bedside reduction  Plan:  Unlikely to receive surgery this admission unless worsening  Clears, adv diet  I/Os  DVT ppx  Pain/Nausea Control  OOB as tolerated  Incentive Spirometry        ED Triage Vitals [12/21/22 0153]   Temperature Pulse Respirations Blood Pressure SpO2   98 7 °F (37 1 °C) 74 16 (!) 188/94 95 %      Temp Source Heart Rate Source Patient Position - Orthostatic VS BP Location FiO2 (%)   Oral Monitor Lying Right arm --      Pain Score       No Pain          Wt Readings from Last 1 Encounters:   12/21/22 59 4 kg (130 lb 15 3 oz)     Additional Vital Signs:   Date/Time Temp Pulse Resp BP MAP (mmHg) SpO2 Calculated FIO2 (%) - Nasal Cannula Nasal Cannula O2 Flow Rate (L/min) O2 Device Patient Position - Orthostatic VS   12/22/22 1152 -- 84 -- -- -- -- -- -- -- --   12/22/22 11:40:51 -- -- 18 99/60 73 94 % 32 3 L/min Nasal cannula --   12/22/22 0743 -- -- -- -- -- 99 % -- -- -- --   12/22/22 07:26:29 98 3 °F (36 8 °C) 68 17 122/57 79 100 % 32 3 L/min Nasal cannula --   12/22/22 02:39:54 98 1 °F (36 7 °C) 67 14 147/75 99 98 % 36 4 L/min Nasal cannula Sitting   12/22/22 0000 -- -- -- -- -- 96 % 36 4 L/min Nasal cannula --   12/21/22 2238 98 3 °F (36 8 °C) 70 16 135/79 91 93 % 28 2 L/min Nasal cannula Sitting   12/21/22 2031 -- -- -- -- -- 95 % -- -- -- --   12/21/22 2000 -- -- -- -- -- 96 % 28 2 L/min Nasal cannula --   12/21/22 1600 -- -- -- -- -- 94 % 28 2 L/min Nasal cannula --   12/21/22 1500 98 7 °F (37 1 °C) 67 -- 109/47 Abnormal  56 Abnormal  95 % -- -- -- --   12/21/22 1319 -- -- -- -- -- 94 % -- -- -- --   12/21/22 1100 98 5 °F (36 9 °C) 77 16 93/52 68 93 % 28 2 L/min Nasal cannula Lying   12/21/22 1055 -- 70 17 116/58 83 -- 28 2 L/min Nasal cannula Lying   12/21/22 0515 -- 68 21 145/89 113 94 % 32 3 L/min Nasal cannula --   12/21/22 0430 -- 82 22 185/99 Abnormal  136 -- 32 3 L/min Nasal cannula --   12/21/22 0300 -- 76 -- 207/105 Abnormal   150 95 % 32 3 L/min Nasal cannula --   BP: provider aware at 12/21/22 0300   12/21/22 0156 -- -- -- 168/97 -- -- -- -- Nasal cannula --     Date and Time Eye Opening Best Verbal Response Best Motor Response [place] Coma Scale Score   12/22/22 0800 4 5 6 15   12/22/22 0400 4 5 6 15   12/22/22 0000 4 5 6 15   12/21/22 2000 4 5 6 15   12/21/22 1600 4 5 6 15   12/21/22 1200 4 5 6 15   12/21/22 1120 4 5 6 15   12/20/22 1911 4 5 6 15     12/20/2022 CTA chest/abd/pel:  1   Findings consistent with high-grade small bowel obstruction secondary to intractable bowel within a right inguinal hernia   Recommend emergent surgical consultation  2   Descending thoracic endograft without evidence of aneurysmal dilatation   Stable aneurysmal dilatation of the aortic arch   No dissection       Pertinent Labs/Diagnostic Test Results:   Results from last 7 days   Lab Units 12/22/22  0927 12/21/22  0505 12/20/22  1950   WBC Thousand/uL 8 26 11 01* 10 72*   HEMOGLOBIN g/dL 12 7 15 2 13 9   HEMATOCRIT % 44 8 51 6* 48 8   PLATELETS Thousands/uL 188 212 199   NEUTROS ABS Thousands/µL 5 89 8 58* 8 97*     Results from last 7 days   Lab Units 12/22/22  0507 12/21/22  0505 12/20/22  1950   SODIUM mmol/L 141 139 139   POTASSIUM mmol/L 3 8 4 6 4 6   CHLORIDE mmol/L 99 97 95*   CO2 mmol/L 41* 42* 41*   ANION GAP mmol/L 1* 0* 3*   BUN mg/dL 19 22 28*   CREATININE mg/dL 0 69 0 79 0 83   EGFR ml/min/1 73sq m 86 81 80   CALCIUM mg/dL 8 6 9 8 9 6   MAGNESIUM mg/dL  --  2 2  --    PHOSPHORUS mg/dL  --  4 8*  --      Results from last 7 days   Lab Units 12/21/22  0505 12/20/22  1950   AST U/L 24 26   ALT U/L 27 22   ALK PHOS U/L 71 68   TOTAL PROTEIN g/dL 7 1 6 8   ALBUMIN g/dL 3 0* 3 4*   TOTAL BILIRUBIN mg/dL 0 71 0 66     Results from last 7 days   Lab Units 12/22/22  0507 12/21/22  0505 12/20/22  1950   GLUCOSE RANDOM mg/dL 109 108 135*       Results from last 7 days   Lab Units 12/21/22  0505 12/20/22 2005   LACTIC ACID mmol/L 1 9 1 3     Results from last 7 days   Lab Units 12/20/22  1950   LIPASE u/L 94     ED Treatment:   Medication Administration from 12/20/2022 2334 to 12/21/2022 1106       Date/Time Order Dose Route Action     12/21/2022 0320 EST hydrALAZINE (APRESOLINE) injection 5 mg 5 mg Intravenous Given     12/21/2022 0501 EST labetalol (NORMODYNE) injection 10 mg 10 mg Intravenous Given     12/21/2022 0505 EST lactated ringers infusion 75 mL/hr Intravenous New Bag     12/21/2022 1058 EST enoxaparin (LOVENOX) subcutaneous injection 40 mg 40 mg Subcutaneous Given     12/21/2022 0553 EST ondansetron (ZOFRAN) injection 4 mg 4 mg Intravenous Given     12/21/2022 0940 EST HYDROmorphone HCl (DILAUDID) injection 0 2 mg 0 2 mg Intravenous Given        Past Medical History:   Diagnosis Date   • Acute metabolic encephalopathy 84/70/4937   • Anemia    • Appendicolith    • Ascending aortic aneurysm     3 7   • Asthma    • BPH (benign prostatic hyperplasia)    • CAD (coronary artery disease)     noted on CT scan   • CHF (congestive heart failure) (HCC)    • COPD (chronic obstructive pulmonary disease) ([facility] )    • Descending thoracic aortic aneurysm    • Diabetes mellitus ([facility] )    • Diverticulosis    • Former tobacco use    • GERD (gastroesophageal reflux disease)    • History of DVT (deep vein thrombosis)     Left leg   • History of transfusion    • Hypertension    • Inguinal hernia     right   • Nephrolithiasis    • Oxygen dependent     2LNC   • Oxygen dependent    • Pneumonia    • Pre-diabetes    • Prostate calculus    • PVD (peripheral vascular disease) ([facility])    • Ulcer    • Varicose vein of leg     b/l     Admitting Diagnosis: Abdominal pain [R10 9]  Inguinal Hernia, Incarcerated [K40 30}  Bowel Obstruction   Acute on chronic diastolic congestive heart failure ([facility]) [I50 33]  Hypertensive urgency [I16 0]  Acute on chronic respiratory failure with hypercapnia ([facility]) [J96 22]  Aneurysm of aortic arch without rupture [I71 22]  Age/Sex: 82 y o  male  Admission Orders:  Diet Sae/CHO Controlled  Inc Shawn  I&O  Lenny SCDs    Scheduled Medications:  enoxaparin, 40 mg, Subcutaneous, Daily  formoterol, 20 mcg, Nebulization, Q12H  gabapentin, 100 mg, Oral, HS  influenza vaccine, 0 7 mL, Intramuscular, Once  ipratropium, 0 5 mg, Nebulization, TID      Continuous IV Infusions:  lactated ringers infusion  Rate: 75 mL/hr Dose: 75 mL/hr  Freq: Continuous Route: IV  Indications of Use: IV Hydration  Last Dose: Stopped (12/21/22 2053)  Start: 12/21/22 0345 End: 12/21/22 1921    dextrose 5 % and sodium chloride 0 45 % with KCl 20 mEq/L infusion  Rate: 50 mL/hr Dose: 50 mL/hr  Freq: Continuous Route: IV  Last Dose: Stopped (12/22/22 0900)  Start: 12/21/22 1930 End: 12/22/22 0641    PRN Meds:  hydrALAZINE, 5 mg, Intravenous, Q6H PRN   X 1 dose 12/21  HYDROmorphone, 0 2 mg, Intravenous, Q4H PRN  labetalol, 10 mg, Intravenous, Q4H PRN   X 1 dose 12/21  naloxone, 0 04 mg, Intravenous, Q1MIN PRN  ondansetron, 4 mg, Intravenous, Q6H PRN   X 1 dose 12/21  oxyCODONE, 2 5 mg, Oral, Q4H PRN  oxyCODONE, 5 mg, Oral, Q4H PRN        IP CONSULT TO CARDIOLOGY  IP CONSULT TO PULMONOLOGY  IP CONSULT TO PICC TEAM  IP CONSULT TO CASE MANAGEMENT    Network Utilization Review Department  ATTENTION: Please call with any questions or concerns to [phone] and carefully listen to the prompts so that you are directed to the right person  All voicemails are confidential   Molly Doing all requests for admission clinical reviews, approved or denied determinations and any other requests to dedicated fax number below belonging to the campus where the patient is receiving treatment   List of dedicated fax numbers for the Facilities:  [address] DENIALS (Administrative/Medical Necessity) [phone]   [address] (Maternity/NICU/Pediatrics) [phone]   [address] [phone]   [facility]  [phone]   [address] [place] [address] [address] [phone]   [address] [place] [address] [facility] [phone]

## 2022-12-22 NOTE — UTILIZATION REVIEW
NOTIFICATION OF INPATIENT ADMISSION   AUTHORIZATION REQUEST   SERVICING FACILITY:   [facility]  Address: [address] [place] [address]  Tax ID: [id number]  NPI: [id number] ATTENDING PROVIDER:  Attending Name and NPI#: Mian Wall Md [[id number]]  Address: [address] [place] [address]  Phone: [phone]   ADMISSION INFORMATION:  Place of Service: [place] [address]  Place of Service Code: 21  Inpatient Admission Date/Time: 12/21/22  3:31 AM  Discharge Date/Time: No discharge date for patient encounter  Admitting Diagnosis Code/Description:  Abdominal pain [R10 9]  Acute on chronic diastolic congestive heart failure (HCC) [I50 33]  Hypertensive urgency [I16 0]  Acute on chronic respiratory failure with hypercapnia (HCC) [J96 22]  Aneurysm of aortic arch without rupture [I71 22]     UTILIZATION REVIEW CONTACT:  Kody Yeager Utilization   Network Utilization Review Department  Phone: [phone]  Fax: [phone]  Email: [email or web address]  Contact for approvals/pending authorizations, clinical reviews, and discharge  PHYSICIAN ADVISORY SERVICES:  Medical Necessity Denial & Rpuf-ts-Rcsk Review  Phone: [phone]  Fax: [phone]  Email: [email or web address]

## 2022-12-23 ENCOUNTER — NURSE TRIAGE (OUTPATIENT)
Dept: OTHER | Facility: OTHER | Age: 87
End: 2022-12-23

## 2022-12-23 VITALS
DIASTOLIC BLOOD PRESSURE: 67 MMHG | RESPIRATION RATE: 18 BRPM | OXYGEN SATURATION: 94 % | WEIGHT: 130.95 LBS | HEIGHT: 63 IN | HEART RATE: 78 BPM | BODY MASS INDEX: 23.2 KG/M2 | TEMPERATURE: 99 F | SYSTOLIC BLOOD PRESSURE: 127 MMHG

## 2022-12-23 LAB
ANION GAP SERPL CALCULATED.3IONS-SCNC: -1 MMOL/L (ref 4–13)
BUN SERPL-MCNC: 15 MG/DL (ref 5–25)
CALCIUM SERPL-MCNC: 8.8 MG/DL (ref 8.3–10.1)
CHLORIDE SERPL-SCNC: 100 MMOL/L (ref 96–108)
CO2 SERPL-SCNC: 44 MMOL/L (ref 21–32)
CREAT SERPL-MCNC: 0.68 MG/DL (ref 0.6–1.3)
GFR SERPL CREATININE-BSD FRML MDRD: 87 ML/MIN/1.73SQ M
GLUCOSE SERPL-MCNC: 107 MG/DL (ref 65–140)
POTASSIUM SERPL-SCNC: 3.6 MMOL/L (ref 3.5–5.3)
SODIUM SERPL-SCNC: 143 MMOL/L (ref 135–147)

## 2022-12-23 RX ADMIN — ENOXAPARIN SODIUM 40 MG: 40 INJECTION SUBCUTANEOUS at 08:34

## 2022-12-23 RX ADMIN — FORMOTEROL FUMARATE DIHYDRATE 20 MCG: 20 SOLUTION RESPIRATORY (INHALATION) at 05:38

## 2022-12-23 RX ADMIN — DOCUSATE SODIUM 100 MG: 100 CAPSULE, LIQUID FILLED ORAL at 08:34

## 2022-12-23 RX ADMIN — IPRATROPIUM BROMIDE 0.5 MG: 0.5 SOLUTION RESPIRATORY (INHALATION) at 05:38

## 2022-12-23 NOTE — PROGRESS NOTES
Progress Note - General Surgery   Brandie Romero 82 y o  male MRN: [id number]  Unit/Bed#: [facility] 429-01 Encounter: [id number]    Assessment:  Patient is a 82 y o  male  w/ multiple comorbidities w/ incarcerated inguinal hernia s/p bedside reduction  Stable  Plan:  • Diet as tolerated  I/Os  DVT ppx  On 2L of oxygen at home  Continue    Pain/Nausea Control  OOB as tolerated  • Incentive Spirometry  • Plan for discharge today  Subjective/Objective     Subjective:   No acute events overnight  Afebrile, hemodynamically stable  Tolerating diet  No nausea, or vomiting, fevers or chills  Pertinent review of systems as above  All other review of systems negative  Objective:    Blood pressure 153/69, pulse 78, temperature 99 3 °F (37 4 °C), resp  rate 18, height 5' 3" (1 6 m), weight 59 4 kg (130 lb 15 3 oz), SpO2 94 %  ,Body mass index is 23 2 kg/m²  Intake/Output Summary (Last 24 hours) at 12/23/2022 0708  Last data filed at 12/23/2022 0553  Gross per 24 hour   Intake 868 ml   Output 975 ml   Net -107 ml       Invasive Devices     Peripherally Inserted Central Catheter Line  Duration           PICC Line 32/77/74 Right Basilic 1 day                Physical Exam:  General: No acute distress, alert and oriented  CV: Well perfused, regular rate and rhythm  Lungs: Normal work of breathing, no increased respiratory effort  Abdomen: Soft, non-tender, non-distended     Inguinal: No recurrence of inguinal hernia on physical exam  Extremities: No edema, clubbing or cyanosis   Skin: Warm, dry      Results from last 7 days   Lab Units 12/22/22  0927 12/21/22  0505 12/20/22  1950   WBC Thousand/uL 8 26 11 01* 10 72*   HEMOGLOBIN g/dL 12 7 15 2 13 9   HEMATOCRIT % 44 8 51 6* 48 8   PLATELETS Thousands/uL 188 212 199     Results from last 7 days   Lab Units 12/23/22  0531 12/22/22  0507 12/21/22  0505   POTASSIUM mmol/L 3 6 3 8 4 6   CHLORIDE mmol/L 100 99 97   CO2 mmol/L 44* 41* 42*   BUN mg/dL 15 19 22   CREATININE mg/dL 0 68 0 69 0 79   CALCIUM mg/dL 8 8 8 6 9 8            I have personally reviewed pertinent films in PACS      Medications:   Scheduled Meds:  Current Facility-Administered Medications   Medication Dose Route Frequency Provider Last Rate   • docusate sodium  100 mg Oral BID RAQUEL Chambers     • enoxaparin  40 mg Subcutaneous Daily Kamala Toth MD     • formoterol  20 mcg Nebulization Q12H Yao Wong MD     • gabapentin  100 mg Oral HS Kamala Toth MD     • hydrALAZINE  5 mg Intravenous Q6H PRN Kamala Toth MD     • HYDROmorphone  0 2 mg Intravenous Q4H PRN Kamala Toth MD     • influenza vaccine  0 7 mL Intramuscular Once Yao Wong MD     • ipratropium  0 5 mg Nebulization TID Dia Mortensen MD     • labetalol  10 mg Intravenous Q4H PRN Kamala Toth MD     • naloxone  0 04 mg Intravenous Q1MIN PRN Kamala oTth MD     • ondansetron  4 mg Intravenous Q6H PRN Kamala Toth MD     • oxyCODONE  2 5 mg Oral Q4H PRN Kamala Toth MD     • oxyCODONE  5 mg Oral Q4H PRN Kamala Toth MD     • senna  2 tablet Oral HS RAQUEL Chambers       Continuous Infusions:   PRN Meds:  hydrALAZINE, 5 mg, Q6H PRN  HYDROmorphone, 0 2 mg, Q4H PRN  labetalol, 10 mg, Q4H PRN  naloxone, 0 04 mg, Q1MIN PRN  ondansetron, 4 mg, Q6H PRN  oxyCODONE, 2 5 mg, Q4H PRN  oxyCODONE, 5 mg, Q4H PRN      VTE Pharmacologic Prophylaxis: Enoxaparin (Lovenox)  VTE Mechanical Prophylaxis: sequential compression device

## 2022-12-23 NOTE — DISCHARGE SUMMARY
Discharge Summary - Claudette Sa 82 y o  male MRN: [id number]    Unit/Bed#: [facility] 429-01 Encounter: [id number]    Admission Date:   Admission Orders (From admission, onward)     Ordered        12/21/22 0331  Inpatient Admission  Once                        Admitting Diagnosis: Abdominal pain [R10 9]  Acute on chronic diastolic congestive heart failure ([address] ) [I50 33]  Hypertensive urgency [I16 0]  Acute on chronic respiratory failure with hypercapnia ([address] ) [J96 22]  Aneurysm of aortic arch without rupture [I71 22]    HPI: Per Micheal Maradiaga, "A Taiwanese interpretor was used for this encounter  HPI:  Claudette Sa is a 82 y o  male with past medical history of diabetes, hypertension, DVT not on AC, GERD, COPD (oxygen dependent), CHF, CAD, PAD, TIA,  descending thoracic aortic aneurysm status post TEVAR, known aortic arch aneurysm followed by cardiology and cardiothoracic surgery, history of bilateral inguinal hernias requiring laparoscopic repair with mesh in several years ago and subsequent open right inguinal hernia repair with mesh in 2019, who initially presented to the [facility] emergency department yesterday evening complaining of abdominal pain, nausea, vomiting and right groin pain  Emergency department work-up revealed an incarcerated right inguinal hernia containing bowel as well as a high-grade small bowel obstruction with a transition point contained within this hernia  Patient was transferred to [facility] for general surgery evaluation as well as cardiology and pulmonology services for evaluation given his past medical history  Upon arrival to [facility] emergency department, patient is hemodynamically stable and complaining of epigastric abdominal pain  Patient has not had issues with his hernia since his last repair in 2019, until yesterday "    Procedures Performed: No orders of the defined types were placed in this encounter      Summary of Hospital Course: Patient is a five 44-year-old male who comes in for evaluation of abdominal pain  Patient had an incarcerated inguinal hernia which required bedside reduction  He was stable  Patient was consulted to pulmonology and cardiology and they did not recommend any acute work-up while inpatient and he would follow-up outpatient  He would follow-up outpatient with a cardiologist as well as his primary care provider  The pulmonologist recommended adjustment of his inhalers to be switched to nebulized solutions  The message was sent to his primary care provider as well as his incidental findings  This was also discussed at bedside with both patient's son and wife  They expressed verbal understanding of the above work-up  They were also informed to follow-up outpatient with the general surgery team   All questions were answered  Significant Findings, Care, Treatment and Services Provided:   No results found        Complications: no complications    Discharge Diagnosis:   Patient Active Problem List   Diagnosis   • Accelerated essential hypertension   • Chronic obstructive pulmonary disease with acute exacerbation (HCC)   • Descending aortic aneurysm ([facility])   • History of DVT (deep vein thrombosis)   • Benign essential hypertension   • Thoracic aortic aneurysm   • Chronic respiratory failure with hypoxia ([facility])   • Varicose veins of both lower extremities with pain   • 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[facility])   • Edema of both legs   • Snoring   • Thoracic aortic aneurysm without rupture   • Aneurysm, aorta, thoracic   • Leukocytosis   • Thrombocytopenia (HCC)   • Hypochloremia   • Abdominal pain   • Inguinal hernia, incarcerated   • Chronic diastolic CHF (congestive heart failure) ([facility])   • Acquired renal cyst of left kidney   • Preop exam for internal medicine   • Left leg swelling   • TIA (transient ischemic attack)   • Early satiety   • Dysphagia   • Altered mental status   • 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)   • Rectal bleeding   • Hyperlipidemia   • 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)   • Clostridium difficile infection   • Lightheadedness   • BELLA (acute kidney injury) ([facility] )   • CHF (congestive heart failure) ([facility])   • Dementia in other diseases classified elsewhere, unspecified severity, without behavioral disturbance, psychotic disturbance, mood disturbance, and anxiety ([facility] )   • Acute on chronic respiratory failure ([facility])   • Hypertensive urgency   • Severe protein-calorie malnutrition ([facility])   • Hypertension   • S/P hernia repair   • Supplemental oxygen dependent         Medical Problems     Resolved Problems  Date Reviewed: 12/11/2022   None         Condition at Discharge: good         Discharge instructions/Information to patient and family:   See after visit summary for information provided to patient and family  Provisions for Follow-Up Care:  See after visit summary for information related to follow-up care and any pertinent home health orders  PCP: Florin Thakkar MD    Disposition: Home    Planned Readmission: No      Discharge Statement   I spent 23 minutes discharging the patient  This time was spent on the day of discharge  I had direct contact with the patient on the day of discharge  Additional documentation is required if more than 30 minutes were spent on discharge  Discharge Medications:  See after visit summary for reconciled discharge medications provided to patient and family

## 2022-12-23 NOTE — INCIDENTAL FINDINGS
The following findings require follow up:  Radiographic finding   Findin  GALLBLADDER:  Punctate gallstones in the dependent portion of the fundus  2  KIDNEYS/URETERS:  Nonobstructing bilateral 4 mm renal calculi  3  Extensive colonic diverticulosis without evidence of diverticulitis or colitis  4  REPRODUCTIVE ORGANS:  Enlarged prostate  Follow up required: Yes   Follow up should be done within 2-4 week(s)    Please notify the following clinician to assist with the follow up:   Primary Care Provider  Discussed with family at bedside  They expressed understanding of the above findings

## 2022-12-24 NOTE — TELEPHONE ENCOUNTER
C/o only urination a few drops for last the few hours  Also reports bladder/lower abdominal pain, distention and pressure  No additional symptoms  Care advice given  Verbalized understanding and agreeable with disposition  No further questions

## 2022-12-24 NOTE — TELEPHONE ENCOUNTER
Reason for Disposition  • [1] Unable to urinate (or only a few drops) > 4 hours AND [2] bladder feels very full (e g , palpable bladder or strong urge to urinate)    Answer Assessment - Initial Assessment Questions  1  SYMPTOM: "What's the main symptom you're concerned about?" (e g , frequency, incontinence)      Unable to urinate  2  ONSET: "When did the it start?"     12/23  3  PAIN: "Is there any pain?" If Yes, ask: "How bad is it?" (Scale: 1-10; mild, moderate, severe)     5/10   4  CAUSE: "What do you think is causing the symptoms?"      Large prostate   5   OTHER SYMPTOMS: "Do you have any other symptoms?" (e g , fever, flank pain, blood in urine, pain with urination)      Pressure, distention    Protocols used: [facility]

## 2022-12-24 NOTE — TELEPHONE ENCOUNTER
Regarding: enlarged prostate medical advice  ----- Message from Grisel Bonds sent at 12/23/2022 11:39 PM EST -----  "they discharged him today  he has an enlarged prostate, he cant use the bathroom, only a little  i was going to buy him something otc but im scared I dont know what to get him   Maybe something could be prescribed ?"

## 2022-12-27 ENCOUNTER — TRANSITIONAL CARE MANAGEMENT (OUTPATIENT)
Dept: FAMILY MEDICINE CLINIC | Facility: CLINIC | Age: 87
End: 2022-12-27

## 2022-12-27 NOTE — UTILIZATION REVIEW
NOTIFICATION OF ADMISSION DISCHARGE   This is a Notification of Discharge from [address]  Please be advised that this patient has been discharge from our facility  Below you will find the admission and discharge date and time including the patient’s disposition  UTILIZATION REVIEW CONTACT:  Lendel Carrel  Utilization   Network Utilization Review Department  Phone: [phone] x carefully listen to the prompts  All voicemails are confidential   Email: [email or web address]     ADMISSION INFORMATION  PRESENTATION DATE: 12/21/2022  1:50 AM  OBERVATION ADMISSION DATE  INPATIENT ADMISSION DATE: 12/21/22  3:31 AM   DISCHARGE DATE: 12/23/2022  1:25 PM   DISPOSITION:Never Arrived    IMPORTANT INFORMATION:  Send all requests for admission clinical reviews, approved or denied determinations and any other requests to dedicated fax number below belonging to the campus where the patient is receiving treatment   List of dedicated fax numbers:  [address] DENIALS (Administrative/Medical Necessity) [phone]   [address] (Maternity/NICU/Pediatrics) [phone]   [facility] [phone]   [place] [address] [phone]   [address] [phone]   220 [facility] [phone]   90 [facility] [phone]   [facility] [place] [address] [phone]   [facility]  [phone] 405[address] [facility] [phone]   412 [facility] [address] [facility] [phone]

## 2022-12-28 ENCOUNTER — OFFICE VISIT (OUTPATIENT)
Dept: FAMILY MEDICINE CLINIC | Facility: CLINIC | Age: 87
End: 2022-12-28

## 2022-12-28 VITALS
WEIGHT: 131 LBS | RESPIRATION RATE: 20 BRPM | HEIGHT: 63 IN | BODY MASS INDEX: 23.21 KG/M2 | HEART RATE: 58 BPM | TEMPERATURE: 98.1 F | DIASTOLIC BLOOD PRESSURE: 70 MMHG | OXYGEN SATURATION: 96 % | SYSTOLIC BLOOD PRESSURE: 120 MMHG

## 2022-12-28 DIAGNOSIS — K40.91 RECURRENT INGUINAL HERNIA OF RIGHT SIDE WITHOUT OBSTRUCTION OR GANGRENE: Primary | ICD-10-CM

## 2022-12-28 DIAGNOSIS — K56.609 SMALL BOWEL OBSTRUCTION (HCC): ICD-10-CM

## 2022-12-28 RX ORDER — TIOTROPIUM BROMIDE 18 UG/1
CAPSULE ORAL; RESPIRATORY (INHALATION)
COMMUNITY
Start: 2022-12-23 | End: 2022-12-28 | Stop reason: ALTCHOICE

## 2022-12-28 RX ORDER — ALBUTEROL SULFATE 90 UG/1
AEROSOL, METERED RESPIRATORY (INHALATION)
COMMUNITY
Start: 2022-12-20

## 2022-12-28 RX ORDER — BUDESONIDE AND FORMOTEROL FUMARATE DIHYDRATE 160; 4.5 UG/1; UG/1
AEROSOL RESPIRATORY (INHALATION)
COMMUNITY
Start: 2022-12-26 | End: 2022-12-28 | Stop reason: ALTCHOICE

## 2022-12-28 NOTE — PROGRESS NOTES
TCM Call     Date and time call was made  2022  8:34 AM    Patient was hospitialized at  [facility]    Date of Admission  22    Date of discharge  22    Disposition  Home    Current Symptoms  None      TCM Call     Post hospital issues  None    Scheduled for follow up? Yes    Did you obtain your prescribed medications  Yes    Do you need help managing your prescriptions or medications  No    Is transportation to your appointment needed  No    I have advised the patient to call PCP with any new or worsening symptoms  Moraima Solders RMA    Living Arrangements  Family members    Have you fallen in the last 12 months  No    Interperter language line needed  No        Name: Merlene Lanza      :       MRN: [id number]  Encounter Provider: Dee Bradshaw MD  Encounter Date: 2022   Encounter department: [facility]   Large hernia in right inguinal area  Risks of strangulation  It is getting incarceration and having difficulty to reposition it  Requesting surgeon opinion if can be operated with local anesthesia  Due to oxygen dependent and general weakness  Goal is prevent complications  1  Recurrent inguinal hernia of right side without obstruction or gangrene  -     Ambulatory referral to General Surgery; Future    2  Small bowel obstruction (HCC)           Subjective      HPI   Admitted from pain related to hernia on right    Review of Systems    Current Outpatient Medications on File Prior to Visit   Medication Sig   • acetylcysteine (MUCOMYST) 10 % nebulizer solution Take 4 mL by nebulization every 4 (four) hours   • albuterol (PROVENTIL HFA,VENTOLIN HFA) 90 mcg/act inhaler    • losartan (COZAAR) 25 mg tablet Take 1 tablet (25 mg total) by mouth daily   • pantoprazole (PROTONIX) 40 mg tablet    • predniSONE 5 mg tablet Take 1 tablet (5 mg total) by mouth daily   • tiotropium-olodaterol (Stiolto Respimat) 2 5-2 5 MCG/ACT inhaler Inhale 2 puffs daily       Objective     /70 (BP Location: Left arm, Patient Position: Sitting, Cuff Size: Standard)   Pulse 58   Temp 98 1 °F (36 7 °C) (Tympanic)   Resp 20   Ht 5' 3" (1 6 m)   Wt 59 4 kg (131 lb)   SpO2 96% Comment: oxygen 2 liters  BMI 23 21 kg/m²     Physical Exam   Indirect inguinal hernia  Had trouble to go back in  Patient on Oxygen from COPD    Mable Mcmullen MD

## 2022-12-30 ENCOUNTER — HOSPITAL ENCOUNTER (EMERGENCY)
Facility: HOSPITAL | Age: 87
Discharge: HOME | End: 2022-12-30
Attending: EMERGENCY MEDICINE

## 2022-12-30 ENCOUNTER — APPOINTMENT (EMERGENCY)
Dept: RADIOLOGY | Facility: HOSPITAL | Age: 87
End: 2022-12-30

## 2022-12-30 ENCOUNTER — APPOINTMENT (EMERGENCY)
Dept: CT IMAGING | Facility: HOSPITAL | Age: 87
End: 2022-12-30

## 2022-12-30 VITALS
DIASTOLIC BLOOD PRESSURE: 101 MMHG | RESPIRATION RATE: 16 BRPM | TEMPERATURE: 98.4 F | HEART RATE: 69 BPM | OXYGEN SATURATION: 96 % | SYSTOLIC BLOOD PRESSURE: 182 MMHG

## 2022-12-30 DIAGNOSIS — K40.91 INGUINAL HERNIA RECURRENT UNILATERAL: ICD-10-CM

## 2022-12-30 DIAGNOSIS — R10.9 ABDOMINAL PAIN: Primary | ICD-10-CM

## 2022-12-30 LAB
2HR DELTA HS TROPONIN: 0 NG/L
ALBUMIN SERPL BCP-MCNC: 3.2 G/DL (ref 3.5–5)
ALP SERPL-CCNC: 71 U/L (ref 43–122)
ALT SERPL W P-5'-P-CCNC: 24 U/L
ANION GAP SERPL CALCULATED.3IONS-SCNC: 3 MMOL/L (ref 5–14)
APTT PPP: 27 SECONDS (ref 23–37)
AST SERPL W P-5'-P-CCNC: 27 U/L (ref 17–59)
ATRIAL RATE: 74 BPM
BASOPHILS # BLD AUTO: 0.04 THOUSANDS/ÂΜL (ref 0–0.1)
BASOPHILS NFR BLD AUTO: 1 % (ref 0–1)
BILIRUB SERPL-MCNC: 0.49 MG/DL (ref 0.2–1)
BUN SERPL-MCNC: 24 MG/DL (ref 5–25)
CALCIUM ALBUM COR SERPL-MCNC: 9.5 MG/DL (ref 8.3–10.1)
CALCIUM SERPL-MCNC: 8.9 MG/DL (ref 8.4–10.2)
CARDIAC TROPONIN I PNL SERPL HS: 17 NG/L
CARDIAC TROPONIN I PNL SERPL HS: 17 NG/L
CHLORIDE SERPL-SCNC: 100 MMOL/L (ref 96–108)
CO2 SERPL-SCNC: 40 MMOL/L (ref 21–32)
CREAT SERPL-MCNC: 0.84 MG/DL (ref 0.7–1.5)
EOSINOPHIL # BLD AUTO: 0.65 THOUSAND/ÂΜL (ref 0–0.61)
EOSINOPHIL NFR BLD AUTO: 9 % (ref 0–6)
ERYTHROCYTE [DISTWIDTH] IN BLOOD BY AUTOMATED COUNT: 14 % (ref 11.6–15.1)
FLUAV RNA RESP QL NAA+PROBE: NEGATIVE
FLUBV RNA RESP QL NAA+PROBE: NEGATIVE
GFR SERPL CREATININE-BSD FRML MDRD: 79 ML/MIN/1.73SQ M
GLUCOSE SERPL-MCNC: 102 MG/DL (ref 70–99)
HCT VFR BLD AUTO: 45.4 % (ref 36.5–49.3)
HGB BLD-MCNC: 12.9 G/DL (ref 12–17)
IMM GRANULOCYTES # BLD AUTO: 0.03 THOUSAND/UL (ref 0–0.2)
IMM GRANULOCYTES NFR BLD AUTO: 0 % (ref 0–2)
INR PPP: 0.89 (ref 0.84–1.19)
LACTATE SERPL-SCNC: 1.3 MMOL/L (ref 0.5–2)
LIPASE SERPL-CCNC: 80 U/L (ref 23–300)
LYMPHOCYTES # BLD AUTO: 1.95 THOUSANDS/ÂΜL (ref 0.6–4.47)
LYMPHOCYTES NFR BLD AUTO: 25 % (ref 14–44)
MCH RBC QN AUTO: 30.6 PG (ref 26.8–34.3)
MCHC RBC AUTO-ENTMCNC: 28.4 G/DL (ref 31.4–37.4)
MCV RBC AUTO: 108 FL (ref 82–98)
MONOCYTES # BLD AUTO: 0.71 THOUSAND/ÂΜL (ref 0.17–1.22)
MONOCYTES NFR BLD AUTO: 9 % (ref 4–12)
NEUTROPHILS # BLD AUTO: 4.31 THOUSANDS/ÂΜL (ref 1.85–7.62)
NEUTS SEG NFR BLD AUTO: 56 % (ref 43–75)
NRBC BLD AUTO-RTO: 0 /100 WBCS
NT-PROBNP SERPL-MCNC: 245 PG/ML (ref 0–299)
P AXIS: 55 DEGREES
PLATELET # BLD AUTO: 180 THOUSANDS/UL (ref 149–390)
PMV BLD AUTO: 8.7 FL (ref 8.9–12.7)
POTASSIUM SERPL-SCNC: 3.8 MMOL/L (ref 3.5–5.3)
PR INTERVAL: 140 MS
PROT SERPL-MCNC: 6.7 G/DL (ref 6.4–8.4)
PROTHROMBIN TIME: 12.4 SECONDS (ref 11.6–14.5)
QRS AXIS: 56 DEGREES
QRSD INTERVAL: 86 MS
QT INTERVAL: 380 MS
QTC INTERVAL: 421 MS
RBC # BLD AUTO: 4.22 MILLION/UL (ref 3.88–5.62)
RSV RNA RESP QL NAA+PROBE: NEGATIVE
SARS-COV-2 RNA RESP QL NAA+PROBE: NEGATIVE
SODIUM SERPL-SCNC: 143 MMOL/L (ref 135–147)
T WAVE AXIS: 52 DEGREES
VENTRICULAR RATE: 74 BPM
WBC # BLD AUTO: 7.69 THOUSAND/UL (ref 4.31–10.16)

## 2022-12-30 RX ORDER — DOCUSATE SODIUM 100 MG/1
100 CAPSULE, LIQUID FILLED ORAL EVERY 12 HOURS
Qty: 60 CAPSULE | Refills: 0 | Status: SHIPPED | OUTPATIENT
Start: 2022-12-30

## 2022-12-30 RX ORDER — MORPHINE SULFATE 4 MG/ML
4 INJECTION, SOLUTION INTRAMUSCULAR; INTRAVENOUS ONCE
Status: COMPLETED | OUTPATIENT
Start: 2022-12-30 | End: 2022-12-30

## 2022-12-30 RX ADMIN — MORPHINE SULFATE 4 MG: 4 INJECTION, SOLUTION INTRAMUSCULAR; INTRAVENOUS at 12:23

## 2022-12-30 RX ADMIN — IOHEXOL 100 ML: 350 INJECTION, SOLUTION INTRAVENOUS at 13:39

## 2022-12-30 NOTE — LETTER
[address] [place] [address]  [facility]      January 12, 2023    MRN: [id number]     Phone: [phone]     Dear Mr Jorden Cobian recently had a(n)  performed on  at  [address] [place] [address] that was requested by Jerry Moon MD  The study was reviewed by a radiologist, which is a physician who specializes in medical imaging  The radiologist issued a report describing his or her findings  In that report there was a finding that the radiologist felt warranted further discussion with your health care provider and that discussion would be beneficial to you  The results were sent to Jerry Moon MD on    We recommend that you contact Jerry Moon MD at  or set up an appointment to discuss the results of the imaging test  If you have already heard from Jerry Moon MD regarding the results of your study, you can disregard this letter  This letter is not meant to alarm you, but intended to encourage you to follow-up on your results with the provider that sent you for the imaging study  In addition, we have enclosed answers to frequently asked questions by other patients who have also received a letter to review results with their health care provider (see page two)  Thank you for choosing [facility] for your medical imaging needs  FREQUENTLY ASKED QUESTIONS    1  Why am I receiving this letter? [facility] requires us to notify patients who have findings on imaging exams that may require more testing or follow-up with a health professional within the next 3 months  2  How serious is the finding on the imaging test?  This letter is sent to all patients who may need follow-up or more testing within the next 3 months    Receiving this letter does not necessarily mean you have a life-threatening imaging finding or disease  Recommendations in the radiologist’s imaging report are general in nature and it is up to your healthcare provider to say whether those recommendations make sense for your situation  You are strongly encouraged to talk to your health care provider about the results and ask whether additional steps need to be taken  3  Where can I get a copy of the final report for my recent radiology exam?  To get a full copy of the report you can access your records online at [email or web address] or please contact [facility] Records Department at [phone] Monday through Friday between 8 am and 6 pm          4  What do I need to do now? Please contact your health care provider who requested the imaging study to discuss what further actions (if any) are needed  You may have already heard from (your ordering provider) in regard to this test in which case you can disregard this letter  NOTICE IN ACCORDANCE WITH THE [place] STATE “PATIENT TEST RESULT INFORMATION ACT OF 2018”    You are receiving this notice as a result of a determination by your diagnostic imaging service that further discussions of your test results are warranted and would be beneficial to you  The complete results of your test or tests have been or will be sent to the health care practitioner that ordered the test or tests  It is recommended that you contact your health care practitioner to discuss your results as soon as possible

## 2022-12-30 NOTE — ED PROVIDER NOTES
History  Chief Complaint   Patient presents with   • Decreased Oxygen Level     Pt arrives with family reporting patient is usually on 2L of oxygen at home but pt ran out of oxygen and has been having low o2  Also reports upcoming surgery with Dr Olga Lidia Ovalle who recommended he come to ER  81 yo male with a history of asthma, CAD, DM, HTN, prior DVT, CHF, COPD on supplemental oxygen, and nephrolithiasis presents to the ED complaining of abdominal pain and shortness of breath  The patient reports several days of worsening abdominal pain, generalized but most prevalent in the RLQ  No associated nausea, vomiting, or diarrhea  Last bowel movement was 3 days ago  (+) Flatus  No difficulty urinating  The patient's family reportedly called Dr Lauren Chong office and were instructed to come to the ED for evaluation  The battery  on the patient's portable oxygen machine en route to the ED and he subsequently developed shortness of breath  No chest pain or cough  He denies fevers/chills  No other specific complaints  Prior to Admission Medications   Prescriptions Last Dose Informant Patient Reported?  Taking?   acetylcysteine (MUCOMYST) 10 % nebulizer solution   No No   Sig: Take 4 mL by nebulization every 4 (four) hours   albuterol (PROVENTIL HFA,VENTOLIN HFA) 90 mcg/act inhaler   Yes No   losartan (COZAAR) 25 mg tablet   No No   Sig: Take 1 tablet (25 mg total) by mouth daily   pantoprazole (PROTONIX) 40 mg tablet   Yes No   predniSONE 5 mg tablet   No No   Sig: Take 1 tablet (5 mg total) by mouth daily   tiotropium-olodaterol (Stiolto Respimat) 2 5-2 5 MCG/ACT inhaler   No No   Sig: Inhale 2 puffs daily      Facility-Administered Medications: None       Past Medical History:   Diagnosis Date   • Acute metabolic encephalopathy    • Anemia    • Appendicolith    • Ascending aortic aneurysm     3 7   • Asthma    • BPH (benign prostatic hyperplasia)    • CAD (coronary artery disease)     noted on CT scan   • CHF (congestive heart failure) ([facility])    • COPD (chronic obstructive pulmonary disease) ([facility])    • Descending thoracic aortic aneurysm    • Diabetes mellitus ([facility])    • Diverticulosis    • Former tobacco use    • GERD (gastroesophageal reflux disease)    • History of DVT (deep vein thrombosis)     Left leg   • History of transfusion    • Hypertension    • Inguinal hernia     right   • Nephrolithiasis    • Oxygen dependent     2LNC   • Oxygen dependent    • Pneumonia    • Pre-diabetes    • Prostate calculus    • PVD (peripheral vascular disease) ([facility])    • Ulcer    • Varicose vein of leg     b/l       Past Surgical History:   Procedure Laterality Date   • CARDIAC SURGERY     • ESOPHAGOGASTRODUODENOSCOPY     • HERNIA REPAIR Right 2019    Procedure: REPAIR HERNIA INGUINAL WITH MESH;  Surgeon: Storm Nazario MD;  Location: [facility] MAIN OR;  Service: General   • INGUINAL HERNIA REPAIR Bilateral    • IR TEVAR  2018   • CO EVASC RPR DTA COVERAGE ART ORIGIN 1ST ENDOPROSTH N/A 2018    Procedure: TEVAR - endovascular thoracic aortic aneurysm repair;  Surgeon: Kristina Ortega MD;  Location:  MAIN OR;  Service: Vascular   • THORACIC AORTIC ANEURYSM REPAIR  2018   • VARICOSE VEIN SURGERY Bilateral     vein stripping       Family History   Problem Relation Age of Onset   • Tuberculosis Mother    • No Known Problems Father    • Cancer Sister    • Diabetes Family    • Hypertension Family      I have reviewed and agree with the history as documented      E-Cigarette/Vaping   • E-Cigarette Use Never User      E-Cigarette/Vaping Substances     Social History     Tobacco Use   • Smoking status: Former     Packs/day: 1 00     Years: 35 00     Pack years: 35 00     Types: Cigarettes     Start date:      Quit date:      Years since quittin 0   • Smokeless tobacco: Never   Vaping Use   • Vaping Use: Never used   Substance Use Topics   • Alcohol use: Never   • Drug use: No       Review of Systems Constitutional: Negative for chills and fever  HENT: Negative for sore throat  Respiratory: Positive for shortness of breath  Negative for cough and wheezing  Cardiovascular: Negative for chest pain and palpitations  Gastrointestinal: Positive for abdominal pain and constipation  Negative for diarrhea, nausea and vomiting  Endocrine: Negative for cold intolerance and heat intolerance  Genitourinary: Negative for decreased urine volume, dysuria and flank pain  Musculoskeletal: Negative for back pain  Skin: Negative for rash  Allergic/Immunologic: Negative for immunocompromised state  Neurological: Negative for headaches  Hematological: Negative for adenopathy  Psychiatric/Behavioral: The patient is not nervous/anxious  Physical Exam  Physical Exam  Constitutional:       General: He is not in acute distress  Appearance: He is well-developed  HENT:      Head: Normocephalic and atraumatic  Eyes:      Pupils: Pupils are equal, round, and reactive to light  Cardiovascular:      Rate and Rhythm: Normal rate and regular rhythm  Pulmonary:      Effort: Pulmonary effort is normal  No respiratory distress  Breath sounds: Normal breath sounds  Abdominal:      General: There is no distension  Palpations: Abdomen is soft  Tenderness: There is generalized abdominal tenderness  Hernia: A hernia is present  Hernia is present in the right inguinal area  Musculoskeletal:         General: Normal range of motion  Cervical back: Normal range of motion and neck supple  Skin:     General: Skin is warm and dry  Neurological:      Mental Status: He is alert and oriented to person, place, and time           Vital Signs  ED Triage Vitals   Temperature Pulse Respirations Blood Pressure SpO2   12/30/22 1145 12/30/22 1145 12/30/22 1145 12/30/22 1145 12/30/22 1145   98 4 °F (36 9 °C) 78 18 (!) 171/91 (!) 87 %      Temp Source Heart Rate Source Patient Position - Orthostatic VS BP Location FiO2 (%)   12/30/22 1145 12/30/22 1145 12/30/22 1145 12/30/22 1145 --   Oral Monitor Sitting Left arm       Pain Score       12/30/22 1223       9           Vitals:    12/30/22 1145 12/30/22 1228 12/30/22 1424   BP: (!) 171/91  165/93   Pulse: 78 71 73   Patient Position - Orthostatic VS: Sitting  Lying         Visual Acuity      ED Medications  Medications   morphine injection 4 mg (4 mg Intravenous Given 12/30/22 1223)   iohexol (OMNIPAQUE) 350 MG/ML injection (SINGLE-DOSE) 100 mL (100 mL Intravenous Given 12/30/22 1339)       Diagnostic Studies  Results Reviewed     Procedure Component Value Units Date/Time    HS Troponin I 2hr [[id number]]  (Normal) Collected: 12/30/22 1553    Lab Status: Final result Specimen: Blood from Arm, Right Updated: 12/30/22 1624     hs TnI 2hr 17 ng/L      Delta 2hr hsTnI 0 ng/L     HS Troponin I 4hr [[id number]]     Lab Status: No result Specimen: Blood     FLU/RSV/COVID - if FLU/RSV clinically relevant [[id number]]  (Normal) Collected: 12/30/22 1223    Lab Status: Final result Specimen: Nares from Nose Updated: 12/30/22 1308     SARS-CoV-2 Negative     INFLUENZA A PCR Negative     INFLUENZA B PCR Negative     RSV PCR Negative    Narrative:      FOR PEDIATRIC PATIENTS - copy/paste COVID Guidelines URL to browser: [email or web address]    SARS-CoV-2 assay is a Nucleic Acid Amplification assay intended for the  qualitative detection of nucleic acid from SARS-CoV-2 in nasopharyngeal  swabs  Results are for the presumptive identification of SARS-CoV-2 RNA  Positive results are indicative of infection with SARS-CoV-2, the virus  causing COVID-19, but do not rule out bacterial infection or co-infection  with other viruses  Laboratories within the [place] and its  territories are required to report all positive results to the appropriate  public health authorities   Negative results do not preclude SARS-CoV-2  infection and should not be used as the sole basis for treatment or other  patient management decisions  Negative results must be combined with  clinical observations, patient history, and epidemiological information  This test has not been FDA cleared or approved  This test has been authorized by FDA under an Emergency Use Authorization  (EUA)  This test is only authorized for the duration of time the  declaration that circumstances exist justifying the authorization of the  emergency use of an in vitro diagnostic tests for detection of SARS-CoV-2  virus and/or diagnosis of COVID-19 infection under section 564(b)(1) of  the Act, 21 U  S C  383LEA-0(A)(3), unless the authorization is terminated  or revoked sooner  The test has been validated but independent review by FDA  and CLIA is pending  Test performed using [employer] Gene[employer]pert: This RT-PCR assay targets N2,  a region unique to SARS-CoV-2  A conserved region in the E-gene was chosen  for pan-Sarbecovirus detection which includes SARS-CoV-2  According to CMS-2020-01-R, this platform meets the definition of high-throughput technology      Comprehensive metabolic panel [[id number]]  (Abnormal) Collected: 12/30/22 1209    Lab Status: Final result Specimen: Blood from Arm, Left Updated: 12/30/22 1248     Sodium 143 mmol/L      Potassium 3 8 mmol/L      Chloride 100 mmol/L      CO2 40 mmol/L      ANION GAP 3 mmol/L      BUN 24 mg/dL      Creatinine 0 84 mg/dL      Glucose 102 mg/dL      Calcium 8 9 mg/dL      Corrected Calcium 9 5 mg/dL      AST 27 U/L      ALT 24 U/L      Alkaline Phosphatase 71 U/L      Total Protein 6 7 g/dL      Albumin 3 2 g/dL      Total Bilirubin 0 49 mg/dL      eGFR 79 ml/min/1 73sq m     Narrative:      Darryl guidelines for Chronic Kidney Disease (CKD):   •  Stage 1 with normal or high GFR (GFR > 90 mL/min/1 73 square meters)  •  Stage 2 Mild CKD (GFR = 60-89 mL/min/1 73 square meters)  •  Stage 3A Moderate CKD (GFR = 45-59 mL/min/1 73 square meters)  •  Stage 3B Moderate CKD (GFR = 30-44 mL/min/1 73 square meters)  •  Stage 4 Severe CKD (GFR = 15-29 mL/min/1 73 square meters)  •  Stage 5 End Stage CKD (GFR <15 mL/min/1 73 square meters)  Note: GFR calculation is accurate only with a steady state creatinine    HS Troponin 0hr (reflex protocol) [[id number]]  (Normal) Collected: 12/30/22 1209    Lab Status: Final result Specimen: Blood from Arm, Left Updated: 12/30/22 1240     hs TnI 0hr 17 ng/L     Protime-INR [[id number]]  (Normal) Collected: 12/30/22 1209    Lab Status: Final result Specimen: Blood from Arm, Left Updated: 12/30/22 1238     Protime 12 4 seconds      INR 0 89    APTT [[id number]]  (Normal) Collected: 12/30/22 1209    Lab Status: Final result Specimen: Blood from Arm, Left Updated: 12/30/22 1238     PTT 27 seconds     NT-BNP PRO [[id number]]  (Normal) Collected: 12/30/22 1209    Lab Status: Final result Specimen: Blood from Arm, Left Updated: 12/30/22 1237     NT-proBNP 245 pg/mL     Lipase [[id number]]  (Normal) Collected: 12/30/22 1209    Lab Status: Final result Specimen: Blood from Arm, Left Updated: 12/30/22 1235     Lipase 80 u/L     Lactic acid, plasma [[id number]]  (Normal) Collected: 12/30/22 1209    Lab Status: Final result Specimen: Blood from Arm, Left Updated: 12/30/22 1228     LACTIC ACID 1 3 mmol/L     Narrative:      Result may be elevated if tourniquet was used during collection      CBC and differential [[id number]]  (Abnormal) Collected: 12/30/22 1209    Lab Status: Final result Specimen: Blood from Arm, Left Updated: 12/30/22 1220     WBC 7 69 Thousand/uL      RBC 4 22 Million/uL      Hemoglobin 12 9 g/dL      Hematocrit 45 4 %       fL      MCH 30 6 pg      MCHC 28 4 g/dL      RDW 14 0 %      MPV 8 7 fL      Platelets 738 Thousands/uL      nRBC 0 /100 WBCs      Neutrophils Relative 56 %      Immat GRANS % 0 %      Lymphocytes Relative 25 %      Monocytes Relative 9 % Eosinophils Relative 9 %      Basophils Relative 1 %      Neutrophils Absolute 4 31 Thousands/µL      Immature Grans Absolute 0 03 Thousand/uL      Lymphocytes Absolute 1 95 Thousands/µL      Monocytes Absolute 0 71 Thousand/µL      Eosinophils Absolute 0 65 Thousand/µL      Basophils Absolute 0 04 Thousands/µL                  CT abdomen pelvis with contrast   Final Result by Lobo Dover MD (12/30 0463)      Persistent small bowel obstruction with transition point in right inguinal hernia, similar to prior examination  Persistent mild submucosal edema of small bowel loop in the right inguinal hernia with surrounding ascites  Recommend correlation with    physical examination and general surgery consultation  Partially imaged 4 3 cm aneurysmal dilatation of descending thoracic aorta status post TEVAR  Recommend continued yearly follow-up  Bilateral subcentimeter nonobstructing renal calculi  Small volume ascites  Additional chronic/incidental findings as detailed above  I personally discussed the findings of persistent small bowel obstruction with transition point in right inguinal hernia appearing similar to prior examination with [address] on 12/30/2022 at 2:22 PM                      Workstation performed: [id number]         XR chest 2 views   Final Result by Roseanna Flor MD (12/30 1313)      No acute cardiopulmonary disease                    Workstation performed: [id number]                    Procedures  ECG 12 Lead Documentation Only    Date/Time: 12/30/2022 12:45 PM  Performed by: Kamaljit Ca MD  Authorized by: Kamaljit Ca MD     Indications / Diagnosis:  SOB, abdominal pain  Interpretation:     Interpretation: normal    Rate:     ECG rate:  74 bpm    ECG rate assessment: normal    Rhythm:     Rhythm: sinus rhythm    Ectopy:     Ectopy: PVCs    QRS:     QRS axis:  Normal    QRS intervals:  Normal  Conduction:     Conduction: normal    ST segments:     ST segments:  Normal  T waves:     T waves: normal      CriticalCare Time  Performed by: Azam Cruz MD  Authorized by: Azam Cruz MD                ED Course                                             MDM  Number of Diagnoses or Management Options  Abdominal pain  Inguinal hernia recurrent unilateral  Diagnosis management comments: The patient is comfortable appearing with stable vital signs other than arrival hypoxia  O2 saturation immediately improved to 99% after baseline 2 liter NC applied  Abdomen is diffusely tender  (+) Right inguinal hernia palpated, not easily reducible  Possible SBO/incarcerated hernia? Will check EKG, CXR, basic labs, troponin, BNP, and CT A/P  IV morphine administered, will continue to monitor in the ED  CT IMPRESSION:  Persistent small bowel obstruction with transition point in right inguinal hernia, similar to prior examination  Persistent mild submucosal edema of small bowel loop in the right inguinal hernia with surrounding ascites  Recommend correlation with   physical examination and general surgery consultation      Partially imaged 4 3 cm aneurysmal dilatation of descending thoracic aorta status post TEVAR  Recommend continued yearly follow-up      Bilateral subcentimeter nonobstructing renal calculi      Small volume ascites  15:00 CT as above  Case discussed with Dr Zulema Sanchez (Gen Surg) on call for [facility]  Dr Zulema Sanchez recommended transfer to [facility] due to the patient's complicated past medical history  Transfer request entered  15:45 Case discussed with Dr Finn Petersen  He would like Dr Zulema Sanchez to evaluate the patient in the [facility] ED prior to accepting the transfer  Dr Zulema Sanchez is en route to [facility]  Patient and family updated  16:40 Dr Zulema Sanchez was able to successfully reduce the right inguinal hernia at bedside  The patient says he feels "much better" and is requesting discharge   Admission for overnight monitoring recommended but the patient declined  He would instead prefer to follow up as an outpatient  Plan for follow up with General Surgery at [facility] next week for reassessment and surgical planning  Contact information provided  The patient and his family are agreeable to this plan  Strict return precautions provided         Amount and/or Complexity of Data Reviewed  Clinical lab tests: ordered and reviewed  Tests in the radiology section of CPT®: ordered and reviewed  Tests in the medicine section of CPT®: reviewed and ordered  Discuss the patient with other providers: yes    Risk of Complications, Morbidity, and/or Mortality  Presenting problems: high  Diagnostic procedures: high  Management options: high        Disposition  Final diagnoses:   Abdominal pain   Inguinal hernia recurrent unilateral     Time reflects when diagnosis was documented in both MDM as applicable and the Disposition within this note     Time User Action Codes Description Comment    12/30/2022  3:23 PM Lethia Si Add [Q75 105] SBO (small bowel obstruction) ([facility] [address] )     12/30/2022  3:23 PM Lethia Si Add [K40 30] Incarcerated inguinal hernia     12/30/2022  4:47 PM Randy Sarna [K40 30] Incarcerated inguinal hernia     12/30/2022  4:47 PM Carolyn Haley Remove [K56 609] SBO (small bowel obstruction) ([facility] [address] )     12/30/2022  4:53 PM Humaira Blade [K40 30] Incarcerated inguinal hernia     12/30/2022  4:56 PM Lethia Si Add [R10 9] Abdominal pain     12/30/2022  4:57 PM Lethia Si Add [K40 91] Inguinal hernia recurrent unilateral       ED Disposition     ED Disposition   Discharge    Condition   Stable    Date/Time   Fri Dec 30, 2022  4:47 PM    Comment              Follow-up Information     Follow up With Specialties Details Why Contact Info    Jeff Cervantes MD Trauma Surgery, General Surgery Schedule an appointment as soon as possible for a visit   [address] [place] [address] Patient's Medications   Discharge Prescriptions    No medications on file       No discharge procedures on file      PDMP Review     None          ED Provider  Electronically Signed by           Bertha Hays MD  12/30/22 8924

## 2022-12-30 NOTE — ED NOTES
Per nurse Stephanie Reason this tech put the patient on 2 litters wall oxygen        Steven Slipper  12/30/22 4285

## 2022-12-30 NOTE — QUICK NOTE
Right inguinal hernia reduced at bedside with no issues, although given recent admission for sbo and recurrent SBO within days of discharge this will likely be recurring until repair, case was previously transferred to [facility] because of patients extensive cardiac and pulmonary conditions, pt has seen dr Jarrett Barrera previously and he feels uncomfortable performing any surgical intervention given his co-morbidities as well as the complexity of his previous hernia repair and he feels that this patient would best be served at [place] and I would have to agree with him  If patient is being discharged consider hernia inguinal hernia truss as an outpt

## 2023-01-03 ENCOUNTER — HOSPITAL ENCOUNTER (EMERGENCY)
Facility: HOSPITAL | Age: 88
Discharge: HOME | End: 2023-01-03
Attending: EMERGENCY MEDICINE

## 2023-01-03 ENCOUNTER — APPOINTMENT (EMERGENCY)
Dept: RADIOLOGY | Facility: HOSPITAL | Age: 88
End: 2023-01-03

## 2023-01-03 VITALS
TEMPERATURE: 99.4 F | HEART RATE: 84 BPM | OXYGEN SATURATION: 99 % | SYSTOLIC BLOOD PRESSURE: 172 MMHG | DIASTOLIC BLOOD PRESSURE: 75 MMHG | RESPIRATION RATE: 20 BRPM

## 2023-01-03 DIAGNOSIS — K56.609 SMALL BOWEL OBSTRUCTION (HCC): Primary | ICD-10-CM

## 2023-01-03 LAB
ALBUMIN SERPL BCP-MCNC: 2.6 G/DL (ref 3.5–5)
ALP SERPL-CCNC: 77 U/L (ref 46–116)
ALT SERPL W P-5'-P-CCNC: 26 U/L (ref 12–78)
ANION GAP SERPL CALCULATED.3IONS-SCNC: -1 MMOL/L (ref 4–13)
AST SERPL W P-5'-P-CCNC: 42 U/L (ref 5–45)
BASOPHILS # BLD AUTO: 0.03 THOUSANDS/ÂΜL (ref 0–0.1)
BASOPHILS NFR BLD AUTO: 0 % (ref 0–1)
BILIRUB SERPL-MCNC: 0.63 MG/DL (ref 0.2–1)
BUN SERPL-MCNC: 19 MG/DL (ref 5–25)
CALCIUM ALBUM COR SERPL-MCNC: 10.3 MG/DL (ref 8.3–10.1)
CALCIUM SERPL-MCNC: 9.2 MG/DL (ref 8.3–10.1)
CHLORIDE SERPL-SCNC: 102 MMOL/L (ref 96–108)
CO2 SERPL-SCNC: 40 MMOL/L (ref 21–32)
CREAT SERPL-MCNC: 0.9 MG/DL (ref 0.6–1.3)
EOSINOPHIL # BLD AUTO: 0.51 THOUSAND/ÂΜL (ref 0–0.61)
EOSINOPHIL NFR BLD AUTO: 6 % (ref 0–6)
ERYTHROCYTE [DISTWIDTH] IN BLOOD BY AUTOMATED COUNT: 14 % (ref 11.6–15.1)
GFR SERPL CREATININE-BSD FRML MDRD: 77 ML/MIN/1.73SQ M
GLUCOSE SERPL-MCNC: 129 MG/DL (ref 65–140)
HCT VFR BLD AUTO: 41.6 % (ref 36.5–49.3)
HGB BLD-MCNC: 12.1 G/DL (ref 12–17)
IMM GRANULOCYTES # BLD AUTO: 0.04 THOUSAND/UL (ref 0–0.2)
IMM GRANULOCYTES NFR BLD AUTO: 1 % (ref 0–2)
LACTATE SERPL-SCNC: 1.1 MMOL/L (ref 0.5–2)
LYMPHOCYTES # BLD AUTO: 0.61 THOUSANDS/ÂΜL (ref 0.6–4.47)
LYMPHOCYTES NFR BLD AUTO: 8 % (ref 14–44)
MCH RBC QN AUTO: 30.9 PG (ref 26.8–34.3)
MCHC RBC AUTO-ENTMCNC: 29.1 G/DL (ref 31.4–37.4)
MCV RBC AUTO: 106 FL (ref 82–98)
MONOCYTES # BLD AUTO: 0.69 THOUSAND/ÂΜL (ref 0.17–1.22)
MONOCYTES NFR BLD AUTO: 9 % (ref 4–12)
NEUTROPHILS # BLD AUTO: 6.25 THOUSANDS/ÂΜL (ref 1.85–7.62)
NEUTS SEG NFR BLD AUTO: 76 % (ref 43–75)
NRBC BLD AUTO-RTO: 0 /100 WBCS
PLATELET # BLD AUTO: 153 THOUSANDS/UL (ref 149–390)
PMV BLD AUTO: 8.8 FL (ref 8.9–12.7)
POTASSIUM SERPL-SCNC: 5.3 MMOL/L (ref 3.5–5.3)
PROT SERPL-MCNC: 7.1 G/DL (ref 6.4–8.4)
RBC # BLD AUTO: 3.91 MILLION/UL (ref 3.88–5.62)
SODIUM SERPL-SCNC: 141 MMOL/L (ref 135–147)
WBC # BLD AUTO: 8.13 THOUSAND/UL (ref 4.31–10.16)

## 2023-01-03 RX ORDER — LABETALOL HYDROCHLORIDE 5 MG/ML
10 INJECTION, SOLUTION INTRAVENOUS ONCE
Status: COMPLETED | OUTPATIENT
Start: 2023-01-03 | End: 2023-01-03

## 2023-01-03 RX ORDER — HYDROMORPHONE HCL/PF 1 MG/ML
0.5 SYRINGE (ML) INJECTION ONCE
Status: COMPLETED | OUTPATIENT
Start: 2023-01-03 | End: 2023-01-03

## 2023-01-03 RX ORDER — HYDROMORPHONE HCL/PF 1 MG/ML
0.5 SYRINGE (ML) INJECTION ONCE
Status: DISCONTINUED | OUTPATIENT
Start: 2023-01-03 | End: 2023-01-03

## 2023-01-03 RX ORDER — ONDANSETRON 2 MG/ML
4 INJECTION INTRAMUSCULAR; INTRAVENOUS ONCE
Status: COMPLETED | OUTPATIENT
Start: 2023-01-03 | End: 2023-01-03

## 2023-01-03 RX ORDER — ONDANSETRON 4 MG/1
4 TABLET, ORALLY DISINTEGRATING ORAL ONCE
Status: DISCONTINUED | OUTPATIENT
Start: 2023-01-03 | End: 2023-01-03

## 2023-01-03 RX ORDER — ONDANSETRON 2 MG/ML
4 INJECTION INTRAMUSCULAR; INTRAVENOUS ONCE
Status: DISCONTINUED | OUTPATIENT
Start: 2023-01-03 | End: 2023-01-03

## 2023-01-03 RX ADMIN — ONDANSETRON 4 MG: 2 INJECTION INTRAMUSCULAR; INTRAVENOUS at 08:08

## 2023-01-03 RX ADMIN — LABETALOL HYDROCHLORIDE 10 MG: 5 INJECTION, SOLUTION INTRAVENOUS at 09:09

## 2023-01-03 RX ADMIN — HYDROMORPHONE HYDROCHLORIDE 0.5 MG: 1 INJECTION, SOLUTION INTRAMUSCULAR; INTRAVENOUS; SUBCUTANEOUS at 08:08

## 2023-01-03 NOTE — ED NOTES
Pt awaiting ride from [place]     [facility], [facility]  01/03/23 5623

## 2023-01-03 NOTE — ED PROVIDER NOTES
History  Chief Complaint   Patient presents with   • Hernia     Pt presents via wheelchair from home c/o hernia pain  Pt recently scene in ED and dicharged home  Pt unable to set up GI appt yet  82-year-old male with past medical history of dementia, thoracic aortic aneurysms with repair, type 2 DM, COPD (on 2L of O2), CHF, HTN, PVD, CAD, BPH, anemia, diverticulosis, and DVT presents to the ED today with RLQ sbo  Has been seen multiples times in ED recently for same  Notes indicate patient is high risk for surgery and was reducible at bedside despite ct findings  Patient's son at bedside, stating father is grunting and is in lot of pain and his belly is bloated  He states he doesn't want to keep bringing him back to the ED and doesn't 'want him to suffer in pain till he dies  He ordered a belt for his hernia which will be arriving tomorrow  No fevers  No loss of appetite  No chest pain  No increase in sob from baseline  Prior to Admission Medications   Prescriptions Last Dose Informant Patient Reported?  Taking?   acetylcysteine (MUCOMYST) 10 % nebulizer solution   No No   Sig: Take 4 mL by nebulization every 4 (four) hours   albuterol (PROVENTIL HFA,VENTOLIN HFA) 90 mcg/act inhaler   Yes No   docusate sodium (COLACE) 100 mg capsule   No No   Sig: Take 1 capsule (100 mg total) by mouth every 12 (twelve) hours   losartan (COZAAR) 25 mg tablet   No No   Sig: Take 1 tablet (25 mg total) by mouth daily   pantoprazole (PROTONIX) 40 mg tablet   Yes No   predniSONE 5 mg tablet   No No   Sig: Take 1 tablet (5 mg total) by mouth daily   tiotropium-olodaterol (Stiolto Respimat) 2 5-2 5 MCG/ACT inhaler   No No   Sig: Inhale 2 puffs daily      Facility-Administered Medications: None       Past Medical History:   Diagnosis Date   • Acute metabolic encephalopathy 97/00/4129   • Anemia    • Appendicolith    • Ascending aortic aneurysm     3 7   • Asthma    • BPH (benign prostatic hyperplasia)    • CAD (coronary artery disease)     noted on CT scan   • CHF (congestive heart failure) ([facility])    • COPD (chronic obstructive pulmonary disease) ([facility])    • Descending thoracic aortic aneurysm    • Diabetes mellitus ([address] )    • Diverticulosis    • Former tobacco use    • GERD (gastroesophageal reflux disease)    • History of DVT (deep vein thrombosis)     Left leg   • History of transfusion    • Hypertension    • Inguinal hernia     right   • Nephrolithiasis    • Oxygen dependent     2LNC   • Oxygen dependent    • Pneumonia    • Pre-diabetes    • Prostate calculus    • PVD (peripheral vascular disease) ([facility])    • Ulcer    • Varicose vein of leg     b/l       Past Surgical History:   Procedure Laterality Date   • CARDIAC SURGERY     • ESOPHAGOGASTRODUODENOSCOPY     • HERNIA REPAIR Right 2019    Procedure: REPAIR HERNIA INGUINAL WITH MESH;  Surgeon: Mónica Verde MD;  [facility] MA[facility] OR;  Service: General   • INGUINAL HERNIA REPAIR Bilateral    • IR TEVAR  2018   • [place] EVASC RPR DTA COVERAGE ART ORIGIN 1ST ENDOPROSTH N/A 2018    Procedure: TEVAR - endovascular thoracic aortic aneurysm repair;  Surgeon: Ivis Ortega MD;  Location:  MAIN OR;  Service: Vascular   • THORACIC AORTIC ANEURYSM REPAIR  2018   • VARICOSE VEIN SURGERY Bilateral     vein stripping       Family History   Problem Relation Age of Onset   • Tuberculosis Mother    • No Known Problems Father    • Cancer Sister    • Diabetes Family    • Hypertension Family      I have reviewed and agree with the history as documented  E-Cigarette/Vaping   • E-Cigarette Use Never User      E-Cigarette/Vaping Substances     Social History     Tobacco Use   • Smoking status: Former     Packs/day: 1 00     Years: 35 00     Pack years: 35 00     Types: Cigarettes     Start date:      Quit date:      Years since quittin 0   • Smokeless tobacco: Never   Vaping Use   • Vaping Use: Never used   Substance Use Topics   • Alcohol use: Never   • Drug use:  No Review of Systems   Constitutional: Negative for fatigue and fever  Respiratory: Negative for shortness of breath  Gastrointestinal: Positive for abdominal pain  Negative for vomiting  Skin: Negative for wound  Psychiatric/Behavioral: Negative for agitation  All other systems reviewed and are negative  Physical Exam    Physical Exam  Vitals and nursing note reviewed  Constitutional:       General: He is in acute distress  Appearance: He is well-developed  He is not toxic-appearing or diaphoretic  HENT:      Head: Normocephalic and atraumatic  Nose:      Comments: 2L NC     Mouth/Throat:      Mouth: Mucous membranes are dry  Pharynx: Oropharynx is clear  Eyes:      Conjunctiva/sclera: Conjunctivae normal    Cardiovascular:      Rate and Rhythm: Normal rate and regular rhythm  Heart sounds: Normal heart sounds  Pulmonary:      Effort: Pulmonary effort is normal       Breath sounds: Normal breath sounds  Abdominal:      Palpations: Abdomen is soft  Tenderness: There is abdominal tenderness  There is no rebound  Hernia: A hernia is present  Comments: RLQ reducible but comes back easily   Musculoskeletal:      Cervical back: Normal range of motion and neck supple  Skin:     General: Skin is warm and dry  Neurological:      Mental Status: He is alert  Mental status is at baseline     Psychiatric:         Behavior: Behavior normal          Physical Activity: Not on file         Vital Signs  ED Triage Vitals [01/03/23 0658]   Temperature Pulse Respirations Blood Pressure SpO2   99 4 °F (37 4 °C) 83 22 (!) 205/91 93 %      Temp Source Heart Rate Source Patient Position - Orthostatic VS BP Location FiO2 (%)   Oral Monitor Lying Right arm --      Pain Score       10 - Worst Possible Pain           Vitals:    01/03/23 0658 01/03/23 0900   BP: (!) 205/91 (!) 172/75   Pulse: 83 84   Patient Position - Orthostatic VS: Lying Sitting         Visual Acuity      ED Medications  Medications   HYDROmorphone (DILAUDID) injection 0 5 mg (0 5 mg Intravenous Given 1/3/23 0808)   ondansetron (ZOFRAN) injection 4 mg (4 mg Intravenous Given 1/3/23 0808)   labetalol (NORMODYNE) injection 10 mg (10 mg Intravenous Given 1/3/23 0909)       Diagnostic Studies  Results Reviewed     Procedure Component Value Units Date/Time    Lactic acid [[id number]]  (Normal) Collected: 01/03/23 0805    Lab Status: Final result Specimen: Blood from Arm, Left Updated: 01/03/23 1005     LACTIC ACID 1 1 mmol/L     Narrative:      Result may be elevated if tourniquet was used during collection      Comprehensive metabolic panel [[id number]]  (Abnormal) Collected: 01/03/23 0805    Lab Status: Final result Specimen: Blood from Arm, Left Updated: 01/03/23 0846     Sodium 141 mmol/L      Potassium 5 3 mmol/L      Chloride 102 mmol/L      CO2 40 mmol/L      ANION GAP -1 mmol/L      BUN 19 mg/dL      Creatinine 0 90 mg/dL      Glucose 129 mg/dL      Calcium 9 2 mg/dL      Corrected Calcium 10 3 mg/dL      AST 42 U/L      ALT 26 U/L      Alkaline Phosphatase 77 U/L      Total Protein 7 1 g/dL      Albumin 2 6 g/dL      Total Bilirubin 0 63 mg/dL      eGFR 77 ml/min/1 73sq m     Narrative:      [place] guidelines for Chronic Kidney Disease (CKD):   •  Stage 1 with normal or high GFR (GFR > 90 mL/min/1 73 square meters)  •  Stage 2 Mild CKD (GFR = 60-89 mL/min/1 73 square meters)  •  Stage 3A Moderate CKD (GFR = 45-59 mL/min/1 73 square meters)  •  Stage 3B Moderate CKD (GFR = 30-44 mL/min/1 73 square meters)  •  Stage 4 Severe CKD (GFR = 15-29 mL/min/1 73 square meters)  •  Stage 5 End Stage CKD (GFR <15 mL/min/1 73 square meters)  Note: GFR calculation is accurate only with a steady state creatinine    CBC and differential [[id number]]  (Abnormal) Collected: 01/03/23 0805    Lab Status: Final result Specimen: Blood from Arm, Left Updated: 01/03/23 0830     WBC 8 13 Thousand/uL      RBC 3 91 Million/uL      Hemoglobin 12 1 g/dL      Hematocrit 41 6 %       fL      MCH 30 9 pg      MCHC 29 1 g/dL      RDW 14 0 %      MPV 8 8 fL      Platelets 444 Thousands/uL      nRBC 0 /100 WBCs      Neutrophils Relative 76 %      Immat GRANS % 1 %      Lymphocytes Relative 8 %      Monocytes Relative 9 %      Eosinophils Relative 6 %      Basophils Relative 0 %      Neutrophils Absolute 6 25 Thousands/µL      Immature Grans Absolute 0 04 Thousand/uL      Lymphocytes Absolute 0 61 Thousands/µL      Monocytes Absolute 0 69 Thousand/µL      Eosinophils Absolute 0 51 Thousand/µL      Basophils Absolute 0 03 Thousands/µL                  XR chest 1 view portable    (Results Pending)              Procedures  Procedures         ED Course       Red team saw the patient, will see dr Harvey Lamas tomorrow at 1p in office  Family good with this plan  Medical Decision Making      Disposition  Final diagnoses:   Small bowel obstruction ([address] )     Time reflects when diagnosis was documented in both MDM as applicable and the Disposition within this note     Time User Action Codes Description Comment    1/3/2023  9:33 AM Moraima Randolph Add [T02 327] Small bowel obstruction [facility])       ED Disposition     ED Disposition   Discharge    Condition   Stable    Date/Time   Tue Reji 3, 2023  9:38 AM    Comment   Shola Puente discharge to home/self care  Follow-up Information     Follow up With Specialties Details Why Contact Info    Mian Wall MD Trauma Surgery, General Surgery Go on 1/4/2023 Appointment scheduled for 1pm on 1/4/2023  Jackie Alva con el Dr Ping Freedman 1:00 por la tarde en cuatro de enero 2023  Llama el lew por telefono si hay preguntas   [address] [facility] [address]      Mian Wall MD Trauma Surgery, General Surgery Go to   tomorrow [address]  [phone]            Patient's Medications   Discharge Prescriptions    No medications on file       No discharge procedures on file      PDMP Review     None          ED Provider  Electronically Signed by           Peterson Rice PA-C  01/03/23 2953

## 2023-01-03 NOTE — ED NOTES
Pt's O2 s/p dilaudid 80% on 2L NC  O2 increased to 6L NC @ this time  Pt sleeping comfortably, with mouth open  Reminded to take deep breaths through his nose            Yuliet Rachel RN  01/03/23 0153

## 2023-01-03 NOTE — CONSULTS
Consultation - General Surgery   Liam Abdi 82 y o  male MRN: [id number]  Unit/Bed#: ED 21 Encounter: [id number]    Assessment/Plan     Assessment:  82yoM w multiple co-morbidities as listed below, known right inguinal hernia causing intermittent SBO and obstructive symptoms, easily reducible at bedside    Vital stable, afebrile, home O2  WBC 8 13  Hemoglobin 12 1  Creatinine 0 9    Plan:  -Discussed with wife at bedside the risks of bringing patient in for urgent/emergent surgical intervention, which would likely be open, given his previous surgical interventions as well as comorbidities, another open surgery would likely result in increased risk for future recurrence, hernia also easily reduced at bedside without evidence of strangulation or incarceration, instead plan would be to follow-up in the office to arrange for laparoscopic/robotic surgical intervention with our team, she is agreeable with this plan and we set up an appointment for tomorrow 1/4 at 1 PM here at [facility] w Dr Ethyl Kayser  -In the meantime informed patient that they should obtain a TRUSS belt/support for patient to wear to prevent hernia recurrence and help with pain/support while at home, she states that her son is attempting to get one of these on [facility] [address] for discharge from a surgical standpoint, disposition per ED    History of Present Illness   HPI:  Liam Abdi is a 82 y o  male well known to general surgery service, PMH of HTN, CAD, CHF, hx of TIA, DVT not on anticoagulation, PAD, descending thoracic aortic aneurysm s/p TEVAR procedure, COPD on home oxygen, DM, GERD, BPH, with known right inguinal hernia and recurrent SBO symptoms, multiple ED presentations with successful reduction in the past month, presenting to [facility] ED again with obstructive symptoms concerning for recurrent SBO within known right inguinal hernia      Patient most recently with hernia reduction at bedside at [facility] on 12/21, again presented to [facility] ED on 12/30 with obstructive symptoms and had bedside reduction done at that time  Family now reports patient was having abdominal pain again at home worrisome for recurrent symptoms, as such they brought him to the emergency department for evaluation  He has had 2 previous inguinal hernia repairs, both open procedures  He was set to follow-up with his primary surgeon Dr Esha Askew for further discussions on future surgical intervention, however now patient's wife/family state that Dr Esha Askew has no plans for further surgical intervention and instead recommends patient follow-up with a surgeon here at [facility]  This also evidenced by recent general surgery notes at [address] [place] [address] stating patient should see general surgery team at [facility] given his comorbidities and previous hernia repairs  Discussed with wife that we can arrange to have them follow-up in the outpatient setting tomorrow to discuss future surgical intervention for right inguinal hernia repair, likely robotic/laparoscopic, she is agreeable to this plan  Consults    Review of Systems   Constitutional: Negative for appetite change, chills and fever  Respiratory: Positive for shortness of breath  Cardiovascular: Negative for chest pain  Gastrointestinal: Positive for abdominal distention, abdominal pain, nausea and vomiting  Negative for constipation and diarrhea  Genitourinary: Positive for difficulty urinating  Musculoskeletal: Negative for back pain and neck pain  Neurological: Negative for dizziness, light-headedness and headaches  Psychiatric/Behavioral: Negative for agitation  All other systems reviewed and are negative        Historical Information   Past Medical History:   Diagnosis Date   • Acute metabolic encephalopathy 63/52/4279   • Anemia    • Appendicolith    • Ascending aortic aneurysm     3 7   • Asthma    • BPH (benign prostatic hyperplasia)    • CAD (coronary artery disease)     noted on CT scan   • CHF (congestive heart failure) ([place] [address] )    • COPD (chronic obstructive pulmonary disease) ([place] [address] )    • Descending thoracic aortic aneurysm    • Diabetes mellitus ([place] [address] )    • Diverticulosis    • Former tobacco use    • GERD (gastroesophageal reflux disease)    • History of DVT (deep vein thrombosis)     Left leg   • History of transfusion    • Hypertension    • Inguinal hernia     right   • Nephrolithiasis    • Oxygen dependent     2LNC   • Oxygen dependent    • Pneumonia    • Pre-diabetes    • Prostate calculus    • PVD (peripheral vascular disease) ([place] [address] )    • Ulcer    • Varicose vein of leg     b/l     Past Surgical History:   Procedure Laterality Date   • CARDIAC SURGERY     • ESOPHAGOGASTRODUODENOSCOPY     • HERNIA REPAIR Right 2019    Procedure: REPAIR HERNIA INGUINAL WITH MESH;  Surgeon: Amelia Burciaga MD;  Location: [address] [place] [address] MAIN OR;  Service: General   • INGUINAL HERNIA REPAIR Bilateral    • IR TEVAR  2018   • [place] EVASC RPR DTA COVERAGE ART ORIGIN 1ST ENDOPROSTH N/A 2018    Procedure: TEVAR - endovascular thoracic aortic aneurysm repair;  Surgeon: Aracelis Ortega MD;  Location:  MAIN OR;  Service: Vascular   • THORACIC AORTIC ANEURYSM REPAIR  2018   • VARICOSE VEIN SURGERY Bilateral     vein stripping     Social History   Social History     Substance and Sexual Activity   Alcohol Use Never     Social History     Substance and Sexual Activity   Drug Use No     E-Cigarette/Vaping   • E-Cigarette Use Never User      E-Cigarette/Vaping Substances     Social History     Tobacco Use   Smoking Status Former   • Packs/day: 1 00   • Years: 35 00   • Pack years: 35 00   • Types: Cigarettes   • Start date:    • Quit date:    • Years since quittin 0   Smokeless Tobacco Never     Family History: non-contributory    Meds/Allergies   all current active meds have been reviewed  Allergies   Allergen Reactions   • Penicillins Hives, Itching and Rash   • Lisinopril Rash     Side pains and rash        Objective   First Vitals:   Blood Pressure: (!) 205/91 (pt reports not taking HTN meds yet today) (01/03/23 0658)  Pulse: 83 (01/03/23 0658)  Temperature: 99 4 °F (37 4 °C) (01/03/23 0658)  Temp Source: Oral (01/03/23 0658)  Respirations: 22 (01/03/23 0658)  SpO2: 93 % (01/03/23 0658)    Current Vitals:   Blood Pressure: (!) 172/75 (01/03/23 0900)  Pulse: 84 (01/03/23 0900)  Temperature: 99 4 °F (37 4 °C) (01/03/23 0658)  Temp Source: Oral (01/03/23 2412)  Respirations: 20 (01/03/23 0900)  SpO2: 99 % (01/03/23 0900)    No intake or output data in the 24 hours ending 01/03/23 0937    Invasive Devices     Peripherally Inserted Central Catheter Line  Duration           PICC Line 27/86/10 Right Basilic 12 days          Peripheral Intravenous Line  Duration           Peripheral IV 01/03/23 Left Antecubital <1 day                Physical Exam  Vitals reviewed  Constitutional:       General: He is not in acute distress  Appearance: He is ill-appearing  He is not toxic-appearing  HENT:      Head: Normocephalic and atraumatic  Mouth/Throat:      Mouth: Mucous membranes are moist    Cardiovascular:      Rate and Rhythm: Normal rate and regular rhythm  Pulses: Normal pulses  Heart sounds: Normal heart sounds  Pulmonary:      Effort: No respiratory distress  Abdominal:      General: There is no distension  Palpations: Abdomen is soft  Tenderness: There is no abdominal tenderness  There is no guarding or rebound  Hernia: A hernia is present  Genitourinary:      Musculoskeletal:         General: Normal range of motion  Cervical back: Normal range of motion  Skin:     General: Skin is warm  Capillary Refill: Capillary refill takes less than 2 seconds  Psychiatric:         Mood and Affect: Mood normal          Lab Results:   I have personally reviewed pertinent lab results    , CBC:   Lab Results   Component Value Date    WBC 8 13 01/03/2023    HGB 12 1 01/03/2023    HCT 41 6 01/03/2023     (H) 01/03/2023     01/03/2023    MCH 30 9 01/03/2023    MCHC 29 1 (L) 01/03/2023    RDW 14 0 01/03/2023    MPV 8 8 (L) 01/03/2023    NRBC 0 01/03/2023   , CMP:   Lab Results   Component Value Date    SODIUM 141 01/03/2023    K 5 3 01/03/2023     01/03/2023    CO2 40 (H) 01/03/2023    BUN 19 01/03/2023    CREATININE 0 90 01/03/2023    CALCIUM 9 2 01/03/2023    AST 42 01/03/2023    ALT 26 01/03/2023    ALKPHOS 77 01/03/2023    EGFR 77 01/03/2023     Imaging: I have personally reviewed pertinent reports  EKG, Pathology, and Other Studies: I have personally reviewed pertinent reports

## 2023-01-03 NOTE — DISCHARGE INSTRUCTIONS
Go to Juana Aguilar MD (Trauma Surgery) on 1/4/2023; Appointment scheduled for 1pm on 1/4/2023  Jovanny Siad con el Dr Brady Kylie 1:00 por la tarde en cuatro de enero 2023  Llama el lew por telefono si hay preguntas

## 2023-01-03 NOTE — DISCHARGE INSTR - AVS FIRST PAGE
Tiene sj abdifatah con el Doctor Celine Marsh 1:15 pm 1/4/2023    Por favor compre Erin Ruth de hernia inguinal  [place] se puede comprar en [address]  com por aprozimademente $25  Si la hernia se repite, se puede aplicar sj suave presion hacia adentro y Ashu Sires arriba hacia la cadera para reducirla  Puede aplicar hielo a hernia tambien  Si hay nauseas, vomitos, incapacidad para pasar gases o evacuaciones intestinales, acuda a la cristian de emergencias  You have an appointment with general surgery at [facility] on 1/4/2023  Please purchase a inguinal hernia truss  This can be bought on [address] for approximately $25   If the hernia recurs, the patient or family member may apply gentle pressure upward and inward toward the right hip to reduce the hernia  You may also apply ice to assist with hernia reduction and pain control  If uncontrolled nausea, vomiting, or inability to pass gas or have bowel movements, please return to the emergency room for evaluation

## 2023-01-03 NOTE — ED NOTES
Spoke with [facility] PA, will wait to hear from surgery before placing IV on pt       Sander Dickson RN  01/03/23 6338

## 2023-01-04 ENCOUNTER — CONSULT (OUTPATIENT)
Dept: SURGERY | Facility: CLINIC | Age: 88
End: 2023-01-04

## 2023-01-04 VITALS
WEIGHT: 128.13 LBS | BODY MASS INDEX: 22.7 KG/M2 | HEART RATE: 85 BPM | SYSTOLIC BLOOD PRESSURE: 139 MMHG | DIASTOLIC BLOOD PRESSURE: 75 MMHG | TEMPERATURE: 97.2 F

## 2023-01-04 DIAGNOSIS — K40.91 RECURRENT RIGHT INGUINAL HERNIA: Primary | ICD-10-CM

## 2023-01-04 RX ORDER — HEPARIN SODIUM 5000 [USP'U]/ML
5000 INJECTION, SOLUTION INTRAVENOUS; SUBCUTANEOUS ONCE
OUTPATIENT
Start: 2023-01-04 | End: 2023-01-04

## 2023-01-04 NOTE — ASSESSMENT & PLAN NOTE
Talk to family and patient Situation  He is very uncomfortable with this hernia and having been in the emergency room on a couple of occasions because of it  He would like to have something done to fix this  I told them that there is still a risk of recurrence and that this would be the third repair  Pulm he has much higher cardiac risk as well as pulmonary risk and especially pulmonary risks being on supplemental oxygen all the time  There was talk of doing this laparoscopically however, I told him that he would definitely need to be put to sleep because of that and the risks of him requiring ventilatory support postop would be quite high  The other reason for not doing it laparoscopically is that the preperitoneal space is already been violated by the previous preperitoneal hernia repair  I told him an open hernia repair would be the best approach however there were still high risks for doing it that way with local and sedation  I explained that the previous surgery had a lot of adhesions so the chances of me having a problem with the blood supply to the testicle were high so that it may be easier to do an orchiectomy at the same time that way placing a mesh patch would be much easier  There stands he is a significantly higher risk than a normal individual   Would like to proceed with surgery  Risks, benefits, alternatives, what to expect postoperatively were explained to him and his family  I did explain that may be best not to do surgery, but he seems insistent on doing something about the hernia      Plan: Recurrent right inguinal hernia repair possible orchiectomy

## 2023-01-04 NOTE — PROGRESS NOTES
Office Visit - General Surgery  Daisy Winkler MRN: [id number]  Encounter: [id number]    Assessment and Plan  Problem List Items Addressed This Visit        Other    Recurrent right inguinal hernia - Primary     Talk to family and patient Situation  He is very uncomfortable with this hernia and having been in the emergency room on a couple of occasions because of it  He would like to have something done to fix this  I told them that there is still a risk of recurrence and that this would be the third repair  Pulm he has much higher cardiac risk as well as pulmonary risk and especially pulmonary risks being on supplemental oxygen all the time  There was talk of doing this laparoscopically however, I told him that he would definitely need to be put to sleep because of that and the risks of him requiring ventilatory support postop would be quite high  The other reason for not doing it laparoscopically is that the preperitoneal space is already been violated by the previous preperitoneal hernia repair  I told him an open hernia repair would be the best approach however there were still high risks for doing it that way with local and sedation  I explained that the previous surgery had a lot of adhesions so the chances of me having a problem with the blood supply to the testicle were high so that it may be easier to do an orchiectomy at the same time that way placing a mesh patch would be much easier  There stands he is a significantly higher risk than a normal individual   Would like to proceed with surgery  Risks, benefits, alternatives, what to expect postoperatively were explained to him and his family  I did explain that may be best not to do surgery, but he seems insistent on doing something about the hernia      Plan: Recurrent right inguinal hernia repair possible orchiectomy            Chief Complaint:  Daisy Winkler is a 82 y o  male who presents for Exam And Consultation (Small Bowel Obstruction)    Subjective  82year-old male with a recurrent right inguinal hernia has been causing problems  His last operation was few years ago  A small bowel obstruction  He had an open repair with a preperitoneal mesh placement and an onlay mesh placement  The surgery was dictated is being very difficult because of a lot of scar tissue from a previous hernia surgery  The patient complains of a lot of pain when it pops out  He has been in the emergency room at least a couple of times from what the family tells me that is causing a lot of problems  He does have underlying severe COPD for which he takes oxygen all the time and asthma        Past Medical History:   Diagnosis Date   • Acute metabolic encephalopathy 72/51/6485   • Anemia    • Appendicolith    • Ascending aortic aneurysm     3 7   • Asthma    • BPH (benign prostatic hyperplasia)    • CAD (coronary artery disease)     noted on CT scan   • CHF (congestive heart failure) ([facility])    • COPD (chronic obstructive pulmonary disease) ([facility])    • Descending thoracic aortic aneurysm    • Diabetes mellitus ([address] )    • Diverticulosis    • Former tobacco use    • GERD (gastroesophageal reflux disease)    • History of DVT (deep vein thrombosis)     Left leg   • History of transfusion    • Hypertension    • Inguinal hernia     right   • Nephrolithiasis    • Oxygen dependent     2LNC   • Oxygen dependent    • Pneumonia    • Pre-diabetes    • Prostate calculus    • PVD (peripheral vascular disease) ([facility])    • Ulcer    • Varicose vein of leg     b/l       Past Surgical History:   Procedure Laterality Date   • CARDIAC SURGERY     • ESOPHAGOGASTRODUODENOSCOPY     • HERNIA REPAIR Right 1/21/2019    Procedure: REPAIR HERNIA INGUINAL WITH MESH;  Surgeon: Kaitlyn Morel MD;  Location: [address] [place] [address];  Service: General   • INGUINAL HERNIA REPAIR Bilateral    • IR TEVAR  12/27/2018   • CA EVASC RPR DTA COVERAGE ART ORIGIN 1ST ENDOPROSTH N/A 12/27/2018    Procedure: TEVAR - endovascular thoracic aortic aneurysm repair;  Surgeon: Carlie Schaefer MD;  [facility]: [facility];  Service: Vascular   • THORACIC AORTIC ANEURYSM REPAIR  2018   • VARICOSE VEIN SURGERY Bilateral     vein stripping       Family History   Problem Relation Age of Onset   • Tuberculosis Mother    • No Known Problems Father    • Cancer Sister    • Diabetes Family    • Hypertension Family        Social History     Tobacco Use   • Smoking status: Former     Packs/day: 1 00     Years: 35 00     Pack years: 35 00     Types: Cigarettes     Start date:      Quit date:      Years since quittin 0   • Smokeless tobacco: Never   Vaping Use   • Vaping Use: Never used   Substance Use Topics   • Alcohol use: Never   • Drug use: No        Medications  Current Outpatient Medications on File Prior to Visit   Medication Sig Dispense Refill   • acetylcysteine (MUCOMYST) 10 % nebulizer solution Take 4 mL by nebulization every 4 (four) hours 80 mL 0   • albuterol (PROVENTIL HFA,VENTOLIN HFA) 90 mcg/act inhaler      • docusate sodium (COLACE) 100 mg capsule Take 1 capsule (100 mg total) by mouth every 12 (twelve) hours 60 capsule 0   • losartan (COZAAR) 25 mg tablet Take 1 tablet (25 mg total) by mouth daily 90 tablet 3   • pantoprazole (PROTONIX) 40 mg tablet      • predniSONE 5 mg tablet Take 1 tablet (5 mg total) by mouth daily 30 tablet 0   • tiotropium-olodaterol (Stiolto Respimat) 2 5-2 5 MCG/ACT inhaler Inhale 2 puffs daily 4 g 5     No current facility-administered medications on file prior to visit  Allergies  Allergies   Allergen Reactions   • Penicillins Hives, Itching and Rash   • Lisinopril Rash     Side pains and rash        Review of Systems   Constitutional: Negative for chills, fatigue and fever  HENT: Negative for congestion, ear pain, sneezing, trouble swallowing and voice change  Eyes: Negative for pain and discharge  Respiratory: Positive for shortness of breath and wheezing   Negative for cough  Cardiovascular: Negative for leg swelling  Gastrointestinal: Positive for abdominal pain  Negative for constipation, diarrhea, nausea and vomiting  Endocrine: Negative for cold intolerance, heat intolerance and polyuria  Genitourinary: Negative for decreased urine volume, difficulty urinating and dysuria  Musculoskeletal: Negative for arthralgias and back pain  Skin: Negative for rash and wound  Allergic/Immunologic: Negative for environmental allergies and food allergies  Neurological: Negative for dizziness and weakness  Hematological: Negative for adenopathy  Does not bruise/bleed easily  Psychiatric/Behavioral: Negative for confusion and sleep disturbance  The patient is not nervous/anxious  All other systems reviewed and are negative  Objective  Vitals:    01/04/23 1534   BP: 139/75   Pulse: 85   Temp: (!) 97 2 °F (36 2 °C)       Physical Exam  Vitals and nursing note reviewed  Constitutional:       General: He is not in acute distress  Appearance: He is well-developed  He is not diaphoretic  Comments: Lauren Mixon appearing elderly male   HENT:      Head: Normocephalic and atraumatic  Right Ear: External ear normal       Left Ear: External ear normal    Eyes:      General: No scleral icterus  Conjunctiva/sclera: Conjunctivae normal    Neck:      Thyroid: No thyromegaly  Trachea: No tracheal deviation  Cardiovascular:      Rate and Rhythm: Normal rate and regular rhythm  Heart sounds: Normal heart sounds  No murmur heard  Pulmonary:      Effort: Pulmonary effort is normal  No respiratory distress  Breath sounds: Normal breath sounds  No wheezing or rales  Abdominal:      General: There is no distension  Palpations: Abdomen is soft  There is no mass  Tenderness: There is no abdominal tenderness  There is no guarding or rebound        Comments: Soft with hernia on the right side   Musculoskeletal:      Cervical back: Normal range of motion and neck supple  Lymphadenopathy:      Cervical: No cervical adenopathy  Skin:     General: Skin is warm and dry  Neurological:      Mental Status: He is oriented to person, place, and time  Psychiatric:         Thought Content:  Thought content normal          Judgment: Judgment normal

## 2023-01-10 DIAGNOSIS — J44.1 CHRONIC OBSTRUCTIVE PULMONARY DISEASE WITH ACUTE EXACERBATION (HCC): ICD-10-CM

## 2023-01-11 RX ORDER — PREDNISONE 1 MG/1
TABLET ORAL
Qty: 30 TABLET | Refills: 0 | Status: SHIPPED | OUTPATIENT
Start: 2023-01-11

## 2023-01-12 ENCOUNTER — OFFICE VISIT (OUTPATIENT)
Dept: FAMILY MEDICINE CLINIC | Facility: CLINIC | Age: 88
End: 2023-01-12

## 2023-01-12 VITALS
RESPIRATION RATE: 20 BRPM | HEART RATE: 58 BPM | TEMPERATURE: 98 F | WEIGHT: 138 LBS | BODY MASS INDEX: 24.45 KG/M2 | OXYGEN SATURATION: 100 % | SYSTOLIC BLOOD PRESSURE: 156 MMHG | HEIGHT: 63 IN | DIASTOLIC BLOOD PRESSURE: 80 MMHG

## 2023-01-12 DIAGNOSIS — K40.30 INGUINAL HERNIA, INCARCERATED: ICD-10-CM

## 2023-01-12 DIAGNOSIS — D69.6 THROMBOCYTOPENIA (HCC): ICD-10-CM

## 2023-01-12 DIAGNOSIS — J96.11 CHRONIC RESPIRATORY FAILURE WITH HYPOXIA (HCC): ICD-10-CM

## 2023-01-12 DIAGNOSIS — J44.1 CHRONIC OBSTRUCTIVE PULMONARY DISEASE WITH ACUTE EXACERBATION (HCC): Primary | ICD-10-CM

## 2023-01-12 NOTE — LETTER
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[facility])   • Thoracic aortic aneurysm without rupture   • Aneurysm, aorta, thoracic   • Leukocytosis   • Thrombocytopenia ([facility])   • Hypochloremia   • Chronic diastolic CHF (congestive heart failure) ([facility] )   • Acquired renal cyst of left kidney   • Preop cardiovascular exam   • TIA (transient ischemic attack)   • Early satiety   • Dysphagia   • 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)   • Rectal bleeding   • Hyperlipidemia   • 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

## 2023-01-13 PROBLEM — Z01.810 PREOP CARDIOVASCULAR EXAM: Status: ACTIVE | Noted: 2019-01-21

## 2023-01-18 PROBLEM — A49.8 CLOSTRIDIUM DIFFICILE INFECTION: Status: RESOLVED | Noted: 2021-08-07 | Resolved: 2023-01-18

## 2023-01-18 PROBLEM — N17.9 AKI (ACUTE KIDNEY INJURY) (HCC): Status: RESOLVED | Noted: 2022-03-16 | Resolved: 2023-01-18

## 2023-01-18 PROBLEM — M79.89 LEFT LEG SWELLING: Status: RESOLVED | Noted: 2019-03-22 | Resolved: 2023-01-18

## 2023-01-18 PROBLEM — R60.0 EDEMA OF BOTH LEGS: Status: RESOLVED | Noted: 2018-08-02 | Resolved: 2023-01-18

## 2023-01-18 PROBLEM — I10 ACCELERATED ESSENTIAL HYPERTENSION: Status: RESOLVED | Noted: 2018-02-01 | Resolved: 2023-01-18

## 2023-01-18 PROBLEM — I71.20 THORACIC AORTIC ANEURYSM (HCC): Status: RESOLVED | Noted: 2017-08-02 | Resolved: 2023-01-18

## 2023-01-18 PROBLEM — I10 HYPERTENSION: Status: RESOLVED | Noted: 2018-02-01 | Resolved: 2023-01-18

## 2023-01-18 PROBLEM — F02.80 DEMENTIA IN OTHER DISEASES CLASSIFIED ELSEWHERE, UNSPECIFIED SEVERITY, WITHOUT BEHAVIORAL DISTURBANCE, PSYCHOTIC DISTURBANCE, MOOD DISTURBANCE, AND ANXIETY (HCC): Status: RESOLVED | Noted: 2022-10-11 | Resolved: 2023-01-18

## 2023-01-18 PROBLEM — R10.9 ABDOMINAL PAIN: Status: RESOLVED | Noted: 2019-01-19 | Resolved: 2023-01-18

## 2023-01-18 PROBLEM — J96.20 ACUTE ON CHRONIC RESPIRATORY FAILURE (HCC): Status: RESOLVED | Noted: 2022-12-04 | Resolved: 2023-01-18

## 2023-01-18 PROBLEM — I16.0 HYPERTENSIVE URGENCY: Status: RESOLVED | Noted: 2022-12-04 | Resolved: 2023-01-18

## 2023-01-18 PROBLEM — R06.83 SNORING: Status: RESOLVED | Noted: 2018-08-02 | Resolved: 2023-01-18

## 2023-01-18 PROBLEM — K40.30 INGUINAL HERNIA, INCARCERATED: Status: RESOLVED | Noted: 2019-01-19 | Resolved: 2023-01-18

## 2023-01-18 NOTE — ASSESSMENT & PLAN NOTE
Patient continue with 24 hours oxygen, he is here with portable oxygen  The goal is to prevent the hospitalization from COPD exacerbation  Recommendation is continue on oxygen, try to exercise while on oxygen, avoid smoking or secondhand smoking, use mask in public places,   Use inhalers and nebulizer as recommended

## 2023-01-18 NOTE — ASSESSMENT & PLAN NOTE
Resolved after last admission, it is related to large inguinal hernia  Patient declines surgery  Explained that way to resolve incarcerated hernia, it does not resolve a certain point to go to the emergency room

## 2023-01-18 NOTE — ASSESSMENT & PLAN NOTE
He has previous low platelets  Current numbers are improved  Lab Results   Component Value Date/Time     01/03/2023 08:05 AM     03/01/2014 04:55 AM     Numbers are improved, continue same treatment

## 2023-01-18 NOTE — PROGRESS NOTES
Name: Franny Molina      :       MRN: [id number]  Encounter Provider: Osiris Cole MD  Encounter Date: 2023   Encounter department:   [facility]   Patient is Canceling his surgery due to high risk and previous failed to repair current hernia  I counseled the patient about benefits and risk, he decided not to have surgery  Patient has poor mobility, I am requesting skilled nurse to visit her to ensure compliance and safety  1  Chronic obstructive pulmonary disease with acute exacerbation [facility])  Assessment & Plan:  Patient continue with 24 hours oxygen, he is here with portable oxygen  The goal is to prevent the hospitalization from COPD exacerbation  Recommendation is continue on oxygen, try to exercise while on oxygen, avoid smoking or secondhand smoking, use mask in public places,   Use inhalers and nebulizer as recommended  2  Chronic respiratory failure with hypoxia (HCC)    3  Inguinal hernia, incarcerated recurrent    4  Thrombocytopenia ([address] )  Assessment & Plan:  He has previous low platelets  Current numbers are improved  Lab Results   Component Value Date/Time     2023 08:05 AM     2014 04:55 AM     Numbers are improved, continue same treatment  Subjective      Patient is here to discuss declining surgery for inguinal hernia  He was seen by surgeon and was recommended general surgery  Patient has previous surgery in the same area with mesh placement    He suffers of oxygen dependent due to chronic obstructive pulmonary disease, he has history of aortic aneurysm repair, DVT, hypertension, TIA, CHF             Review of Systems    Current Outpatient Medications on File Prior to Visit   Medication Sig   • acetylcysteine (MUCOMYST) 10 % nebulizer solution Take 4 mL by nebulization every 4 (four) hours   • albuterol (PROVENTIL HFA,VENTOLIN HFA) 90 mcg/act inhaler    • docusate sodium (COLACE) 100 mg capsule Take 1 capsule (100 mg total) by mouth every 12 (twelve) hours   • Incruse Ellipta 62 5 MCG/ACT AEPB inhaler    • losartan (COZAAR) 25 mg tablet Take 1 tablet (25 mg total) by mouth daily   • pantoprazole (PROTONIX) 40 mg tablet    • predniSONE 5 mg tablet TAKE ONE TABLET BY MOUTH ONCE DAILY   • tiotropium-olodaterol (Stiolto Respimat) 2 5-2 5 MCG/ACT inhaler Inhale 2 puffs daily       Objective     /80 (BP Location: Left arm, Patient Position: Sitting, Cuff Size: Standard)   Pulse 58   Temp 98 °F (36 7 °C) (Tympanic)   Resp 20   Ht 5' 3" (1 6 m)   Wt 62 6 kg (138 lb)   SpO2 100% Comment: oxygen 3 liters  BMI 24 45 kg/m²     Physical Exam  Jen Graham MD

## 2023-01-18 NOTE — ASSESSMENT & PLAN NOTE
Patient has a large inguinal indirect hernia, which is causing incarceration frequent  it is easily when laying down  Patient was also Attempted surgical repair, but since he is oxygen dependent he is afraid of surgery,  previous surgery failed to solve problems

## 2023-01-19 NOTE — QUICK NOTE
MDM:     History of hernia, same, has been seen by many physicians for same  Ddx: obstruction/necrosis secondary to hernia  [facility]  [facility]

## 2023-02-17 ENCOUNTER — DOCUMENTATION (OUTPATIENT)
Dept: FAMILY MEDICINE CLINIC | Facility: CLINIC | Age: 88
End: 2023-02-17

## 2023-02-17 DIAGNOSIS — J44.1 CHRONIC OBSTRUCTIVE PULMONARY DISEASE WITH ACUTE EXACERBATION (HCC): Primary | ICD-10-CM

## 2023-02-17 RX ORDER — PREDNISONE 1 MG/1
5 TABLET ORAL DAILY
Qty: 30 TABLET | Refills: 5 | Status: SHIPPED | OUTPATIENT
Start: 2023-02-17

## 2023-02-23 ENCOUNTER — TELEPHONE (OUTPATIENT)
Dept: FAMILY MEDICINE CLINIC | Facility: CLINIC | Age: 88
End: 2023-02-23

## 2023-04-04 ENCOUNTER — CONSULT (OUTPATIENT)
Dept: FAMILY MEDICINE CLINIC | Facility: CLINIC | Age: 88
End: 2023-04-04

## 2023-04-04 VITALS
TEMPERATURE: 97.6 F | BODY MASS INDEX: 23.53 KG/M2 | HEIGHT: 63 IN | WEIGHT: 132.8 LBS | SYSTOLIC BLOOD PRESSURE: 130 MMHG | DIASTOLIC BLOOD PRESSURE: 80 MMHG | HEART RATE: 62 BPM | OXYGEN SATURATION: 86 % | RESPIRATION RATE: 20 BRPM

## 2023-04-04 DIAGNOSIS — I71.9 DESCENDING AORTIC ANEURYSM (HCC): ICD-10-CM

## 2023-04-04 DIAGNOSIS — E43 UNSPECIFIED SEVERE PROTEIN-CALORIE MALNUTRITION (HCC): ICD-10-CM

## 2023-04-04 DIAGNOSIS — H25.9 SENILE CATARACT OF RIGHT EYE, UNSPECIFIED AGE-RELATED CATARACT TYPE: ICD-10-CM

## 2023-04-04 DIAGNOSIS — Z01.818 PREOP EXAMINATION: Primary | ICD-10-CM

## 2023-04-04 DIAGNOSIS — I10 BENIGN ESSENTIAL HYPERTENSION: ICD-10-CM

## 2023-04-04 DIAGNOSIS — I72.4 POPLITEAL ARTERY ANEURYSM, BILATERAL (HCC): ICD-10-CM

## 2023-04-04 DIAGNOSIS — N18.31 CHRONIC KIDNEY DISEASE, STAGE 3A (HCC): ICD-10-CM

## 2023-04-04 DIAGNOSIS — I50.32 CHRONIC DIASTOLIC CHF (CONGESTIVE HEART FAILURE) (HCC): ICD-10-CM

## 2023-04-04 DIAGNOSIS — I71.20 THORACIC AORTIC ANEURYSM WITHOUT RUPTURE, UNSPECIFIED PART (HCC): ICD-10-CM

## 2023-04-04 DIAGNOSIS — J44.1 CHRONIC OBSTRUCTIVE PULMONARY DISEASE WITH ACUTE EXACERBATION (HCC): ICD-10-CM

## 2023-04-04 DIAGNOSIS — K56.609 SMALL BOWEL OBSTRUCTION (HCC): ICD-10-CM

## 2023-04-04 DIAGNOSIS — J96.11 CHRONIC RESPIRATORY FAILURE WITH HYPOXIA (HCC): ICD-10-CM

## 2023-04-04 PROBLEM — D72.829 LEUKOCYTOSIS: Status: RESOLVED | Noted: 2018-12-29 | Resolved: 2023-04-04

## 2023-04-04 PROBLEM — K62.5 RECTAL BLEEDING: Status: RESOLVED | Noted: 2021-08-05 | Resolved: 2023-04-04

## 2023-04-04 PROBLEM — R42 LIGHTHEADEDNESS: Status: RESOLVED | Noted: 2022-03-12 | Resolved: 2023-04-04

## 2023-04-04 PROBLEM — D69.6 THROMBOCYTOPENIA (HCC): Status: RESOLVED | Noted: 2018-12-29 | Resolved: 2023-04-04

## 2023-04-04 PROBLEM — R68.81 EARLY SATIETY: Status: RESOLVED | Noted: 2020-06-12 | Resolved: 2023-04-04

## 2023-04-04 PROBLEM — I50.9 CHF (CONGESTIVE HEART FAILURE) (HCC): Status: RESOLVED | Noted: 2022-04-23 | Resolved: 2023-04-04

## 2023-04-04 PROBLEM — E87.8 HYPOCHLOREMIA: Status: RESOLVED | Noted: 2018-12-29 | Resolved: 2023-04-04

## 2023-04-04 RX ORDER — PREDNISONE 20 MG/1
20 TABLET ORAL DAILY
Qty: 5 TABLET | Refills: 0 | Status: ON HOLD | OUTPATIENT
Start: 2023-04-04

## 2023-04-04 RX ORDER — ASPIRIN 81 MG/1
81 TABLET ORAL DAILY
COMMUNITY
End: 2023-04-04 | Stop reason: SDUPTHER

## 2023-04-04 RX ORDER — ASPIRIN 81 MG/1
81 TABLET ORAL DAILY
Qty: 90 TABLET | Refills: 0 | Status: SHIPPED | OUTPATIENT
Start: 2023-04-04 | End: 2023-04-04 | Stop reason: ALTCHOICE

## 2023-04-04 RX ORDER — AZITHROMYCIN 250 MG/1
TABLET, FILM COATED ORAL
Qty: 6 TABLET | Refills: 0 | Status: SHIPPED | OUTPATIENT
Start: 2023-04-04 | End: 2023-04-05 | Stop reason: SDUPTHER

## 2023-04-04 RX ORDER — IBUPROFEN 400 MG/1
400 TABLET ORAL EVERY 6 HOURS PRN
COMMUNITY
End: 2023-04-05 | Stop reason: SDUPTHER

## 2023-04-04 RX ORDER — POTASSIUM CHLORIDE 20 MEQ/1
TABLET, EXTENDED RELEASE ORAL
COMMUNITY
Start: 2023-02-25 | End: 2023-04-05

## 2023-04-04 NOTE — PROGRESS NOTES
Presurgical Evaluation    Assessment/Plan:    Patient is medically optimized (cleared) for the planned procedure  Further testing/evaluation is not required  Postop concerns: no   Patient was provided instructions for incentive spirometry or deep-breathing exercises  Chronic obstructive pulmonary disease with acute exacerbation (HCC)  Continue multiple inhalers  Unclear if using incruse or symbicort? Continue stiolto  Continue albuterol as needed twice daily usually  Reviewed last pulm consult note inpatient in 12/2022  No recent PFTs, on chronic prednisone 5mg for palliative measure  With recent worsening x 3 days with URI, start zpack and start prednisone 20mg x 5 day burst then restart daily 5mg  Small bowel obstruction ([address] )  Reviewed last CT in 12/2022 with following:  Persistent small bowel obstruction with transition point in right inguinal hernia, similar to prior examination  Persistent mild submucosal edema of small bowel loop in the right inguinal hernia with surrounding ascites  Normal BMs since then, no current N/V/D, bloody stools  Reviewed general surgery consult note in 1/2023, not agreeable for open R inguinal hernia repair due to scar tissue and previous failed attempts and risk of surgery  Chronic respiratory failure with hypoxia (HCC)  Continue home O2 2-3L daily use  Stable currently on 3L  No complaints of SOB  Descending aortic aneurysm (HCC)  Hx of TEVAR in 12/2018    Benign essential hypertension  Stable on losartan  Continue same dose      Lab Results   Component Value Date     03/01/2014    SODIUM 141 01/03/2023    K 5 3 01/03/2023     01/03/2023    CO2 40 (H) 01/03/2023    ANIONGAP 0 (L) 03/01/2014    AGAP -1 (L) 01/03/2023    BUN 19 01/03/2023    CREATININE 0 90 01/03/2023    GLUC 129 01/03/2023    GLUF 109 (H) 06/12/2020    CALCIUM 9 2 01/03/2023    AST 42 01/03/2023    ALT 26 01/03/2023    ALKPHOS 77 01/03/2023    TP 7 1 01/03/2023    TBILI 0 63 01/03/2023    EGFR 77 01/03/2023     Lab Results   Component Value Date    WBC 8 13 01/03/2023    HGB 12 1 01/03/2023    HCT 41 6 01/03/2023     (H) 01/03/2023     01/03/2023         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HCC)  No longer on statin due to age  Discontinue aspirin due to risk of bleeding  Chronic diastolic CHF (congestive heart failure) (HCC)  Wt Readings from Last 3 Encounters:   04/04/23 60 2 kg (132 lb 12 8 oz)   01/12/23 62 6 kg (138 lb)   01/04/23 58 1 kg (128 lb 2 oz)     Last echo in 3/2022 with moderate to severe concentric hypertrophy  Ejection fraction 73%  No signs of fluid overload on exam today  No longer on diuretic  Lab Results   Component Value Date    NTBNP 245 12/30/2022            Chronic kidney disease, stage 3a [facility])  Lab Results   Component Value Date    EGFR 77 01/03/2023    EGFR 79 12/30/2022    EGFR 87 12/23/2022    CREATININE 0 90 01/03/2023    CREATININE 0 84 12/30/2022    CREATININE 0 68 12/23/2022     Stable, avoid excessive NSAIDs, continue ibuprofen 400mg as needed for palliative measure  Does not take daily  Hold 7 days prior to procedure  Leukocytosis  Lab Results   Component Value Date    WBC 8 13 01/03/2023    HGB 12 1 01/03/2023    HCT 41 6 01/03/2023     (H) 01/03/2023     01/03/2023         Unspecified severe protein-calorie malnutrition (HCC)  Wt Readings from Last 3 Encounters:   04/04/23 60 2 kg (132 lb 12 8 oz)   01/12/23 62 6 kg (138 lb)   01/04/23 58 1 kg (128 lb 2 oz)     6 lb weight loss in 3 months  Continue to monitor           Pre-Surgery Instructions:   Medication Instructions   • acetylcysteine (MUCOMYST) 10 % nebulizer solution per anesthesia guidelines    • albuterol (PROVENTIL HFA,VENTOLIN HFA) 90 mcg/act inhaler per anesthesia guidelines    • docusate sodium (COLACE) 100 mg capsule per anesthesia guidelines    • Incruse Ellipta 62 5 MCG/ACT AEPB inhaler per anesthesia guidelines    • ketorolac (ACULAR) 0 5 % ophthalmic solution per anesthesia guidelines    • losartan (COZAAR) 25 mg tablet per anesthesia guidelines    • ofloxacin (OCUFLOX) 0 3 % ophthalmic solution per anesthesia guidelines    • pantoprazole (PROTONIX) 40 mg tablet per anesthesia guidelines    • potassium chloride (K-DUR,KLOR-CON) 20 mEq tablet per anesthesia guidelines    • prednisoLONE acetate (PRED FORTE) 1 % ophthalmic suspension per anesthesia guidelines    • predniSONE 5 mg tablet per anesthesia guidelines    • Symbicort 160-4 5 MCG/ACT inhaler per anesthesia guidelines    • tiotropium-olodaterol (Stiolto Respimat) 2 5-2 5 MCG/ACT inhaler per anesthesia guidelines         Patient ID: Julio Boothe is a 82 y o  male  Chief Complaint   Patient presents with   • Pre-op Exam       Procedure date: 4/18/23    Surgeon:  Dr Thomas Spine  Planned procedure:  R eye caratact  Diagnosis for procedure:  cataract    Prior anesthesia: Yes   General; Complications:  None / Tolerated well    • CAD History: CHF  Patient has not had recent MI <6 weeks, unstable angina, decompensated CHF, significant arrhythmias or severe valvular disease  within the past 6 weeks  NOTE: Patient should see Cardiology if time available before surgery, and if appropriate   Yes  • Pulmonary History: COPD  • Smoking History: former quit in 2001  • Renal history: CKD stage 2 - GFR 60-89  • Diabetes History:  None  • Neurological History: None  • On Immunosuppressant meds/biologics: No    Aspirin and NSAIDs were discussed to discontinue 1 week before surgery YES    Preop labs/testing available and reviewed: yes  Lab Results   Component Value Date     03/01/2014    SODIUM 141 01/03/2023    K 5 3 01/03/2023     01/03/2023    CO2 40 (H) 01/03/2023    ANIONGAP 0 (L) 03/01/2014    AGAP -1 (L) 01/03/2023    BUN 19 01/03/2023    CREATININE 0 90 01/03/2023    GLUC 129 01/03/2023    GLUF 109 (H) 06/12/2020    CALCIUM 9 2 01/03/2023    AST 42 01/03/2023    ALT 26 01/03/2023 ALKPHOS 77 2023    TP 7 1 2023    TBILI 0 63 2023    EGFR 77 2023     Lab Results   Component Value Date    WBC 8 13 2023    HGB 12 1 2023    HCT 41 6 2023     (H) 2023     2023       EKG yes due to patient is >40 years  Sinus bradycardia, no acute ST change with septal infarct, age undetermined    Functional capacity: Walking , 4-5 MPH               4 Mets   Pick the highest level patient can comfortably perform   4 mets or greater for surgery    RCRI  High Risk surgery? (aortic or peripheral vascular)   1 Point  CAD History:         1 Point   MI; Positive Stress Test; CP due to Mi;  Nitrate Usage to control Angina; Pathologic Q wave on EKG  CHF Active:         1 Point   Pulm Edema; Paroxysmal Nocturnal Dyspnea;  Bibasilar Rales (crackles);S3; CHF on CXR  Cerebrovascular Disease (TIA or CVA):     1 Point  DM on Insulin:        1 Point  Serum Creat >2 0 mg/dl:       1 Point          Total Points: 1     Scorin: Class I, Very Low Risk (0 4%)     1: Class II, Low risk (0 9%)     2: Class III Moderate (6 6%)     3: Class IV High (>11%)    BELLA Risk:  GFR:        For PCP:  If GFR>60, Hold ACE/ARB/Diuretic on the day of surgery, and NSAIDS 10 days before  If GFR<45, Consider PRE and POST op Nephrology Consult  If 46 <GFR> 59 : Has Patient had BELLA in last 6 Months? no   If YES: Preop Nephrology consult   If No:  [address] Nephrology consult  Review of Systems   Constitutional: Negative for chills and fever  HENT: Positive for congestion  Negative for postnasal drip, rhinorrhea, sinus pressure, sinus pain, sore throat and trouble swallowing  Eyes: Negative for pain, discharge, redness and itching  Respiratory: Positive for cough, shortness of breath and wheezing  Cardiovascular: Negative for chest pain, palpitations and leg swelling  Gastrointestinal: Positive for abdominal distention (R inguinal hernia extending up)   Negative for "abdominal pain, constipation, diarrhea, nausea, rectal pain and vomiting  Neurological: Negative for headaches  Objective:    Vitals:    04/04/23 1347   BP: 130/80   BP Location: Left arm   Patient Position: Sitting   Cuff Size: Standard   Pulse: 62   Resp: 20   Temp: 97 6 °F (36 4 °C)   TempSrc: Tympanic   SpO2: (!) 86%   Weight: 60 2 kg (132 lb 12 8 oz)   Height: 5' 3\" (1 6 m)        Physical Exam  Vitals and nursing note reviewed  Constitutional:       Appearance: Normal appearance  Comments: Ambulates with cane, wife Viridiana present for exam     HENT:      Head: Normocephalic and atraumatic  Nose:      Comments: 3L nasal cannula  Eyes:      Extraocular Movements: Extraocular movements intact  Pupils: Pupils are equal, round, and reactive to light  Comments: Wearing corrective lenses   Cardiovascular:      Rate and Rhythm: Regular rhythm  Bradycardia present  Pulses: Normal pulses  Heart sounds: Murmur heard  Pulmonary:      Effort: Pulmonary effort is normal       Breath sounds: No stridor  Wheezing (expiratory and inspiratory b/l) present  No rhonchi or rales  Abdominal:       Musculoskeletal:      Cervical back: Normal range of motion and neck supple  Neurological:      Mental Status: He is alert and oriented to person, place, and time  Mental status is at baseline             Elizabeth Bravo PA-C  "

## 2023-04-04 NOTE — ASSESSMENT & PLAN NOTE
Lab Results   Component Value Date    EGFR 77 01/03/2023    EGFR 79 12/30/2022    EGFR 87 12/23/2022    CREATININE 0 90 01/03/2023    CREATININE 0 84 12/30/2022    CREATININE 0 68 12/23/2022     Stable, avoid excessive NSAIDs, continue ibuprofen 400mg as needed for palliative measure  Does not take daily  Hold 7 days prior to procedure

## 2023-04-04 NOTE — ASSESSMENT & PLAN NOTE
Wt Readings from Last 3 Encounters:   04/04/23 60 2 kg (132 lb 12 8 oz)   01/12/23 62 6 kg (138 lb)   01/04/23 58 1 kg (128 lb 2 oz)     Last echo in 3/2022 with moderate to severe concentric hypertrophy  Ejection fraction 73%  No signs of fluid overload on exam today  No longer on diuretic       Lab Results   Component Value Date    NTBNP 245 12/30/2022

## 2023-04-04 NOTE — ASSESSMENT & PLAN NOTE
Reviewed last CT in 12/2022 with following:  Persistent small bowel obstruction with transition point in right inguinal hernia, similar to prior examination  Persistent mild submucosal edema of small bowel loop in the right inguinal hernia with surrounding ascites  Normal BMs since then, no current N/V/D, bloody stools  Reviewed general surgery consult note in 1/2023, not agreeable for open R inguinal hernia repair due to scar tissue and previous failed attempts and risk of surgery

## 2023-04-04 NOTE — ASSESSMENT & PLAN NOTE
Stable on losartan  Continue same dose      Lab Results   Component Value Date     03/01/2014    SODIUM 141 01/03/2023    K 5 3 01/03/2023     01/03/2023    CO2 40 (H) 01/03/2023    ANIONGAP 0 (L) 03/01/2014    AGAP -1 (L) 01/03/2023    BUN 19 01/03/2023    CREATININE 0 90 01/03/2023    GLUC 129 01/03/2023    GLUF 109 (H) 06/12/2020    CALCIUM 9 2 01/03/2023    AST 42 01/03/2023    ALT 26 01/03/2023    ALKPHOS 77 01/03/2023    TP 7 1 01/03/2023    TBILI 0 63 01/03/2023    EGFR 77 01/03/2023     Lab Results   Component Value Date    WBC 8 13 01/03/2023    HGB 12 1 01/03/2023    HCT 41 6 01/03/2023     (H) 01/03/2023     01/03/2023

## 2023-04-04 NOTE — ASSESSMENT & PLAN NOTE
Continue multiple inhalers  Unclear if using incruse or symbicort? Continue stiolto  Continue albuterol as needed twice daily usually  Reviewed last pulm consult note inpatient in 12/2022  No recent PFTs, on chronic prednisone 5mg for palliative measure  With recent worsening x 3 days with URI, start zpack and start prednisone 20mg x 5 day burst then restart daily 5mg

## 2023-04-04 NOTE — ASSESSMENT & PLAN NOTE
Lab Results   Component Value Date    WBC 8 13 01/03/2023    HGB 12 1 01/03/2023    HCT 41 6 01/03/2023     (H) 01/03/2023     01/03/2023

## 2023-04-05 DIAGNOSIS — I10 BENIGN ESSENTIAL HYPERTENSION: ICD-10-CM

## 2023-04-05 DIAGNOSIS — J44.1 CHRONIC OBSTRUCTIVE PULMONARY DISEASE WITH ACUTE EXACERBATION (HCC): ICD-10-CM

## 2023-04-05 DIAGNOSIS — G89.29 CHRONIC BILATERAL LOW BACK PAIN WITHOUT SCIATICA: Primary | ICD-10-CM

## 2023-04-05 DIAGNOSIS — H25.9 SENILE CATARACT OF RIGHT EYE, UNSPECIFIED AGE-RELATED CATARACT TYPE: ICD-10-CM

## 2023-04-05 DIAGNOSIS — M54.50 CHRONIC BILATERAL LOW BACK PAIN WITHOUT SCIATICA: Primary | ICD-10-CM

## 2023-04-05 DIAGNOSIS — H40.2231 CHRONIC PRIMARY ANGLE-CLOSURE GLAUCOMA OF BOTH EYES, MILD STAGE: ICD-10-CM

## 2023-04-05 DIAGNOSIS — K29.50 CHRONIC GASTRITIS WITHOUT BLEEDING, UNSPECIFIED GASTRITIS TYPE: ICD-10-CM

## 2023-04-05 DIAGNOSIS — R10.9 ABDOMINAL PAIN: ICD-10-CM

## 2023-04-05 RX ORDER — AZITHROMYCIN 250 MG/1
TABLET, FILM COATED ORAL
Qty: 6 TABLET | Refills: 0 | Status: ON HOLD | OUTPATIENT
Start: 2023-04-05 | End: 2023-04-09

## 2023-04-05 RX ORDER — PREDNISOLONE ACETATE 10 MG/ML
1 SUSPENSION/ DROPS OPHTHALMIC 3 TIMES DAILY
Qty: 5 ML | Refills: 3 | Status: ON HOLD | OUTPATIENT
Start: 2023-04-05

## 2023-04-05 RX ORDER — PANTOPRAZOLE SODIUM 40 MG/1
40 TABLET, DELAYED RELEASE ORAL DAILY
Qty: 30 TABLET | Refills: 5 | Status: ON HOLD | OUTPATIENT
Start: 2023-04-05

## 2023-04-05 RX ORDER — ACETYLCYSTEINE 100 MG/ML
4 SOLUTION ORAL; RESPIRATORY (INHALATION)
Qty: 80 ML | Refills: 0 | Status: ON HOLD | OUTPATIENT
Start: 2023-04-05

## 2023-04-05 RX ORDER — TIOTROPIUM BROMIDE AND OLODATEROL 3.124; 2.736 UG/1; UG/1
2 SPRAY, METERED RESPIRATORY (INHALATION) DAILY
Qty: 4 G | Refills: 5 | Status: ON HOLD | OUTPATIENT
Start: 2023-04-05

## 2023-04-05 RX ORDER — PREDNISONE 1 MG/1
5 TABLET ORAL DAILY
Qty: 30 TABLET | Refills: 5 | Status: ON HOLD | OUTPATIENT
Start: 2023-04-05

## 2023-04-05 RX ORDER — OFLOXACIN 3 MG/ML
1 SOLUTION/ DROPS OPHTHALMIC 4 TIMES DAILY
Qty: 5 ML | Refills: 0 | Status: ON HOLD | OUTPATIENT
Start: 2023-04-05

## 2023-04-05 RX ORDER — LOSARTAN POTASSIUM 25 MG/1
25 TABLET ORAL DAILY
Qty: 90 TABLET | Refills: 3 | Status: ON HOLD | OUTPATIENT
Start: 2023-04-05

## 2023-04-05 RX ORDER — DOCUSATE SODIUM 100 MG/1
100 CAPSULE, LIQUID FILLED ORAL EVERY 12 HOURS
Qty: 60 CAPSULE | Refills: 0 | Status: ON HOLD | OUTPATIENT
Start: 2023-04-05

## 2023-04-05 RX ORDER — KETOROLAC TROMETHAMINE 5 MG/ML
1 SOLUTION OPHTHALMIC 4 TIMES DAILY
Qty: 5 ML | Refills: 5 | Status: ON HOLD | OUTPATIENT
Start: 2023-04-05

## 2023-04-05 RX ORDER — ALBUTEROL SULFATE 90 UG/1
2 AEROSOL, METERED RESPIRATORY (INHALATION) EVERY 6 HOURS PRN
Qty: 18 G | Refills: 5 | Status: ON HOLD | OUTPATIENT
Start: 2023-04-05

## 2023-04-05 RX ORDER — IBUPROFEN 400 MG/1
400 TABLET ORAL EVERY 8 HOURS PRN
Qty: 90 TABLET | Refills: 3 | Status: ON HOLD | OUTPATIENT
Start: 2023-04-05

## 2023-04-05 RX ORDER — UMECLIDINIUM 62.5 UG/1
1 AEROSOL, POWDER ORAL DAILY
Qty: 30 BLISTER | Refills: 5 | Status: ON HOLD | OUTPATIENT
Start: 2023-04-05

## 2023-04-06 ENCOUNTER — APPOINTMENT (EMERGENCY)
Dept: RADIOLOGY | Facility: HOSPITAL | Age: 88
End: 2023-04-06

## 2023-04-06 ENCOUNTER — HOSPITAL ENCOUNTER (EMERGENCY)
Facility: HOSPITAL | Age: 88
Discharge: HOME | End: 2023-04-06
Attending: STUDENT IN AN ORGANIZED HEALTH CARE EDUCATION/TRAINING PROGRAM

## 2023-04-06 VITALS
OXYGEN SATURATION: 98 % | TEMPERATURE: 98.6 F | SYSTOLIC BLOOD PRESSURE: 164 MMHG | DIASTOLIC BLOOD PRESSURE: 87 MMHG | HEART RATE: 78 BPM | RESPIRATION RATE: 22 BRPM

## 2023-04-06 DIAGNOSIS — J18.9 PNEUMONIA OF RIGHT LUNG DUE TO INFECTIOUS ORGANISM, UNSPECIFIED PART OF LUNG: Primary | ICD-10-CM

## 2023-04-06 PROBLEM — E87.0 HYPERNATREMIA: Status: ACTIVE | Noted: 2023-04-06

## 2023-04-06 LAB
ALBUMIN SERPL BCP-MCNC: 3.9 G/DL (ref 3.5–5)
ALP SERPL-CCNC: 69 U/L (ref 34–104)
ALT SERPL W P-5'-P-CCNC: 15 U/L (ref 7–52)
AST SERPL W P-5'-P-CCNC: 19 U/L (ref 13–39)
ATRIAL RATE: 83 BPM
BASOPHILS # BLD AUTO: 0.04 THOUSANDS/ÂΜL (ref 0–0.1)
BASOPHILS NFR BLD AUTO: 1 % (ref 0–1)
BILIRUB SERPL-MCNC: 0.55 MG/DL (ref 0.2–1)
BUN SERPL-MCNC: 21 MG/DL (ref 5–25)
CALCIUM SERPL-MCNC: 9.9 MG/DL (ref 8.4–10.2)
CHLORIDE SERPL-SCNC: 98 MMOL/L (ref 96–108)
CO2 SERPL-SCNC: >45 MMOL/L (ref 21–32)
CREAT SERPL-MCNC: 0.96 MG/DL (ref 0.6–1.3)
EOSINOPHIL # BLD AUTO: 0.47 THOUSAND/ÂΜL (ref 0–0.61)
EOSINOPHIL NFR BLD AUTO: 7 % (ref 0–6)
ERYTHROCYTE [DISTWIDTH] IN BLOOD BY AUTOMATED COUNT: 13.2 % (ref 11.6–15.1)
FLUAV RNA RESP QL NAA+PROBE: NEGATIVE
FLUBV RNA RESP QL NAA+PROBE: NEGATIVE
GFR SERPL CREATININE-BSD FRML MDRD: 71 ML/MIN/1.73SQ M
GLUCOSE SERPL-MCNC: 125 MG/DL (ref 65–140)
HCT VFR BLD AUTO: 46.8 % (ref 36.5–49.3)
HGB BLD-MCNC: 12.9 G/DL (ref 12–17)
IMM GRANULOCYTES # BLD AUTO: 0.02 THOUSAND/UL (ref 0–0.2)
IMM GRANULOCYTES NFR BLD AUTO: 0 % (ref 0–2)
LYMPHOCYTES # BLD AUTO: 1.6 THOUSANDS/ÂΜL (ref 0.6–4.47)
LYMPHOCYTES NFR BLD AUTO: 22 % (ref 14–44)
MCH RBC QN AUTO: 30.6 PG (ref 26.8–34.3)
MCHC RBC AUTO-ENTMCNC: 27.6 G/DL (ref 31.4–37.4)
MCV RBC AUTO: 111 FL (ref 82–98)
MONOCYTES # BLD AUTO: 0.85 THOUSAND/ÂΜL (ref 0.17–1.22)
MONOCYTES NFR BLD AUTO: 12 % (ref 4–12)
NEUTROPHILS # BLD AUTO: 4.15 THOUSANDS/ÂΜL (ref 1.85–7.62)
NEUTS SEG NFR BLD AUTO: 58 % (ref 43–75)
NRBC BLD AUTO-RTO: 0 /100 WBCS
P AXIS: 56 DEGREES
PLATELET # BLD AUTO: 137 THOUSANDS/UL (ref 149–390)
PMV BLD AUTO: 8.8 FL (ref 8.9–12.7)
POTASSIUM SERPL-SCNC: 4.2 MMOL/L (ref 3.5–5.3)
PR INTERVAL: 160 MS
PROT SERPL-MCNC: 7.1 G/DL (ref 6.4–8.4)
QRS AXIS: 33 DEGREES
QRSD INTERVAL: 86 MS
QT INTERVAL: 378 MS
QTC INTERVAL: 444 MS
RBC # BLD AUTO: 4.21 MILLION/UL (ref 3.88–5.62)
RSV RNA RESP QL NAA+PROBE: NEGATIVE
SARS-COV-2 RNA RESP QL NAA+PROBE: NEGATIVE
SODIUM SERPL-SCNC: 149 MMOL/L (ref 135–147)
T WAVE AXIS: 45 DEGREES
VENTRICULAR RATE: 83 BPM
WBC # BLD AUTO: 7.13 THOUSAND/UL (ref 4.31–10.16)

## 2023-04-06 RX ORDER — CEFPODOXIME PROXETIL 200 MG/1
200 TABLET, FILM COATED ORAL 2 TIMES DAILY
Qty: 10 TABLET | Refills: 0 | Status: ON HOLD | OUTPATIENT
Start: 2023-04-06 | End: 2023-04-11

## 2023-04-06 RX ORDER — CEFUROXIME AXETIL 500 MG/1
500 TABLET ORAL EVERY 12 HOURS SCHEDULED
Qty: 10 TABLET | Refills: 0 | Status: CANCELLED | OUTPATIENT
Start: 2023-04-06 | End: 2023-04-11

## 2023-04-06 NOTE — ED NOTES
Ekg done at [place] [address] [address]  04/06/23 1830 Jessica Salvador is a 33 y.o.  at 38w6d at time of admission here for pre-admit H&P for induction of labor secondary to GDM on metformin, LGA and maternal co-morbitiy.

## 2023-04-06 NOTE — ED NOTES
Pt taken to xray suite by xray tech  Pt's visitor brought apple juice with tobi Romo RN  04/06/23 9670

## 2023-04-06 NOTE — ASSESSMENT & PLAN NOTE
· Patient presents with shortness of breath  · Possible COPD exacerbation with unclear trigger  · Solu-Medrol 40 mg IV every 8 hours scheduled  · Respiratory protocol  · Continue home inhalers

## 2023-04-07 NOTE — ED PROVIDER NOTES
History  Chief Complaint   Patient presents with   • Shortness of Breath     Family states pt c/o sob and coughing and has been confused/lethargic  Pt wearing oxygen concentrator set at 3 L/min  Pulse ox with concentrator on is 61%  74-year-old male with a history of COPD here for evaluation of cough and shortness of breath  Patient and his wife report that symptoms have been present for the last 3 to 4 days  At baseline, patient uses 3 L nasal cannula supplemental oxygen  On arrival, his concentrator does not appear to be working and his oxygen saturation dropped into the 60s  When placed on our oxygen, he immediately improved to the 90s on his home rate  He denies any recent changes in his home rate  His wife reports that when his symptoms began, they were seen by his PCP and he was started on antibiotic for possible pneumonia  She states that his symptoms have not worsened since then, and may have improved slightly  She states that she brought him in because she does not trust doctors, and wanted a second opinion  The patient himself denies any complaints, and states that he feels well  Prior to Admission Medications   Prescriptions Last Dose Informant Patient Reported? Taking? Incruse Ellipta 62 5 MCG/ACT AEPB inhaler   No No   Sig: Inhale 1 puff daily   acetylcysteine (MUCOMYST) 10 % nebulizer solution   No No   Sig: Take 4 mL by nebulization every 4 (four) hours   albuterol (PROVENTIL HFA,VENTOLIN HFA) 90 mcg/act inhaler   No No   Sig: Inhale 2 puffs every 6 (six) hours as needed for wheezing   azithromycin (ZITHROMAX) 250 mg tablet   No No   Sig: Take 2 tablets today then 1 tablet daily x 4 days  Diana 2 tableta yoana mccullough 1 tableta cada dalila x 4 jarquin     docusate sodium (COLACE) 100 mg capsule   No No   Sig: Take 1 capsule (100 mg total) by mouth every 12 (twelve) hours   ibuprofen (MOTRIN) 400 mg tablet   No No   Sig: Take 1 tablet (400 mg total) by mouth every 8 (eight) hours as needed (pain)   ketorolac (ACULAR) 0 5 % ophthalmic solution   No No   Sig: Administer 1 drop to both eyes 4 (four) times a day   losartan (COZAAR) 25 mg tablet   No No   Sig: Take 1 tablet (25 mg total) by mouth daily   ofloxacin (OCUFLOX) 0 3 % ophthalmic solution   No No   Sig: Apply 1 drop to eye 4 (four) times a day   pantoprazole (PROTONIX) 40 mg tablet   No No   Sig: Take 1 tablet (40 mg total) by mouth daily   predniSONE 20 mg tablet   No No   Sig: Take 1 tablet (20 mg total) by mouth daily Diana 1 tableta cada dalila   predniSONE 5 mg tablet   No No   Sig: Take 1 tablet (5 mg total) by mouth daily   prednisoLONE acetate (PRED FORTE) 1 % ophthalmic suspension   No No   Sig: Apply 1 drop to eye 3 (three) times a day   tiotropium-olodaterol (Stiolto Respimat) 2 5-2 5 MCG/ACT inhaler   No No   Sig: Inhale 2 puffs daily      Facility-Administered Medications: None       Past Medical History:   Diagnosis Date   • Acute metabolic encephalopathy 72/56/6771   • Anemia    • Aneurysm, aorta, thoracic (HCC)    • Appendicolith    • Ascending aortic aneurysm ([facility])     3 7   • Asthma    • BPH (benign prostatic hyperplasia)    • CAD (coronary artery disease)     noted on CT scan   • CHF (congestive heart failure) ([facility])    • COPD (chronic obstructive pulmonary disease) ([facility])    • Descending thoracic aortic aneurysm ([facility])    • Diabetes mellitus ([place] [address] )    • Diverticulosis    • Former tobacco use    • GERD (gastroesophageal reflux disease)    • History of DVT (deep vein thrombosis)     Left leg   • History of transfusion    • Hypertension    • Inguinal hernia     right   • Nephrolithiasis    • Oxygen dependent     2LNC   • Oxygen dependent    • Pneumonia    • Pre-diabetes    • Prostate calculus    • PVD (peripheral vascular disease) ([facility])    • Thoracic aortic aneurysm without rupture ([place] [address] ) 11/19/2018    Added automatically from request for surgery [id number]   • Thrombocytopenia ([place] [address] ) 12/29/2018   • Ulcer    • 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)    • Varicose vein of leg     b/l       Past Surgical History:   Procedure Laterality Date   • CARDIAC SURGERY     • ESOPHAGOGASTRODUODENOSCOPY     • HERNIA REPAIR Right 2019    Procedure: REPAIR HERNIA INGUINAL WITH MESH;  Surgeon: Amelia Thayer MD;  Location: [address] [place] [address] MAIN OR;  Service: General   • INGUINAL HERNIA REPAIR Bilateral    • IR TEVAR  2018   • CO EVASC RPR DTA COVERAGE ART ORIGIN 1ST ENDOPROSTH N/A 2018    Procedure: TEVAR - endovascular thoracic aortic aneurysm repair;  Surgeon: Arik Amaya MD;  Location:  MAIN OR;  Service: Vascular   • THORACIC AORTIC ANEURYSM REPAIR  2018   • VARICOSE VEIN SURGERY Bilateral     vein stripping       Family History   Problem Relation Age of Onset   • Tuberculosis Mother    • No Known Problems Father    • Cancer Sister    • Diabetes Family    • Hypertension Family      I have reviewed and agree with the history as documented  E-Cigarette/Vaping   • E-Cigarette Use Never User      E-Cigarette/Vaping Substances     Social History     Tobacco Use   • Smoking status: Former     Packs/day: 1 00     Years: 35 00     Pack years: 35 00     Types: Cigarettes     Start date:      Quit date:      Years since quittin 2   • Smokeless tobacco: Never   Vaping Use   • Vaping Use: Never used   Substance Use Topics   • Alcohol use: Never   • Drug use: No       Review of Systems   Constitutional: Negative for chills and fever  HENT: Negative for congestion  Eyes: Negative for visual disturbance  Respiratory: Positive for cough and shortness of breath  Cardiovascular: Negative for chest pain and palpitations  Gastrointestinal: Negative for abdominal pain, nausea and vomiting  Musculoskeletal: Negative for myalgias  Neurological: Negative for headaches  Physical Exam  Physical Exam  Vitals and nursing note reviewed  Constitutional:       General: He is not in acute distress       Appearance: He is not ill-appearing  HENT:      Head: Normocephalic and atraumatic  Eyes:      Extraocular Movements: Extraocular movements intact  Conjunctiva/sclera: Conjunctivae normal       Pupils: Pupils are equal, round, and reactive to light  Cardiovascular:      Rate and Rhythm: Normal rate and regular rhythm  Pulmonary:      Effort: Pulmonary effort is normal  No respiratory distress  Breath sounds: Normal breath sounds  Abdominal:      General: There is no distension  Palpations: Abdomen is soft  Tenderness: There is no abdominal tenderness  Musculoskeletal:         General: No deformity or signs of injury  Right lower leg: No edema  Left lower leg: No edema  Skin:     General: Skin is warm and dry  Neurological:      Mental Status: He is alert           Vital Signs  ED Triage Vitals   Temperature Pulse Respirations Blood Pressure SpO2   04/06/23 1324 04/06/23 1324 04/06/23 1324 04/06/23 1324 04/06/23 1321   98 6 °F (37 °C) 74 22 (!) 177/83 (!) 61 %      Temp Source Heart Rate Source Patient Position - Orthostatic VS BP Location FiO2 (%)   04/06/23 1324 04/06/23 1324 04/06/23 1324 04/06/23 1324 --   Oral Monitor Lying Left arm       Pain Score       --                  Vitals:    04/06/23 1324 04/06/23 1405 04/06/23 1528   BP: (!) 177/83 158/88 164/87   Pulse: 74 74 78   Patient Position - Orthostatic VS: Lying Lying          Visual Acuity      ED Medications  Medications - No data to display    Diagnostic Studies  Results Reviewed     Procedure Component Value Units Date/Time    FLU/RSV/COVID - if FLU/RSV clinically relevant [[id number]]  (Normal) Collected: 04/06/23 1404    Lab Status: Final result Specimen: Nares from Nose Updated: 04/06/23 5963     SARS-CoV-2 Negative     INFLUENZA A PCR Negative     INFLUENZA B PCR Negative     RSV PCR Negative    Narrative:      FOR PEDIATRIC PATIENTS - copy/paste COVID Guidelines URL to browser: [email or web address] [email or web address]  [email or web address]    SARS-CoV-2 assay is a Nucleic Acid Amplification assay intended for the  qualitative detection of nucleic acid from SARS-CoV-2 in nasopharyngeal  swabs  Results are for the presumptive identification of SARS-CoV-2 RNA  Positive results are indicative of infection with SARS-CoV-2, the virus  causing COVID-19, but do not rule out bacterial infection or co-infection  with other viruses  Laboratories within the [place] and its  territories are required to report all positive results to the appropriate  public health authorities  Negative results do not preclude SARS-CoV-2  infection and should not be used as the sole basis for treatment or other  patient management decisions  Negative results must be combined with  clinical observations, patient history, and epidemiological information  This test has not been FDA cleared or approved  This test has been authorized by FDA under an Emergency Use Authorization  (EUA)  This test is only authorized for the duration of time the  declaration that circumstances exist justifying the authorization of the  emergency use of an in vitro diagnostic tests for detection of SARS-CoV-2  virus and/or diagnosis of COVID-19 infection under section 564(b)(1) of  the Act, 21 U  S C  680RUX-5(T)(8), unless the authorization is terminated  or revoked sooner  The test has been validated but independent review by FDA  and CLIA is pending  Test performed using [employer] GeneXpert: This RT-PCR assay targets N2,  a region unique to SARS-CoV-2  A conserved region in the E-gene was chosen  for pan-Sarbecovirus detection which includes SARS-CoV-2  According to CMS-2020-01-R, this platform meets the definition of high-throughput technology      Comprehensive metabolic panel [[id number]]  (Abnormal) Collected: 04/06/23 1328    Lab Status: Final result Specimen: Blood from Arm, Right Updated: 04/06/23 2138 Sodium 149 mmol/L      Potassium 4 2 mmol/L      Chloride 98 mmol/L      CO2 >45 mmol/L      ANION GAP --     BUN 21 mg/dL      Creatinine 0 96 mg/dL      Glucose 125 mg/dL      Calcium 9 9 mg/dL      AST 19 U/L      ALT 15 U/L      Alkaline Phosphatase 69 U/L      Total Protein 7 1 g/dL      Albumin 3 9 g/dL      Total Bilirubin 0 55 mg/dL      eGFR 71 ml/min/1 73sq m     Narrative:      [place] guidelines for Chronic Kidney Disease (CKD):   •  Stage 1 with normal or high GFR (GFR > 90 mL/min/1 73 square meters)  •  Stage 2 Mild CKD (GFR = 60-89 mL/min/1 73 square meters)  •  Stage 3A Moderate CKD (GFR = 45-59 mL/min/1 73 square meters)  •  Stage 3B Moderate CKD (GFR = 30-44 mL/min/1 73 square meters)  •  Stage 4 Severe CKD (GFR = 15-29 mL/min/1 73 square meters)  •  Stage 5 End Stage CKD (GFR <15 mL/min/1 73 square meters)  Note: GFR calculation is accurate only with a steady state creatinine    CBC and differential [[id number]]  (Abnormal) Collected: 04/06/23 1328    Lab Status: Final result Specimen: Blood from Arm, Right Updated: 04/06/23 1335     WBC 7 13 Thousand/uL      RBC 4 21 Million/uL      Hemoglobin 12 9 g/dL      Hematocrit 46 8 %       fL      MCH 30 6 pg      MCHC 27 6 g/dL      RDW 13 2 %      MPV 8 8 fL      Platelets 475 Thousands/uL      nRBC 0 /100 WBCs      Neutrophils Relative 58 %      Immat GRANS % 0 %      Lymphocytes Relative 22 %      Monocytes Relative 12 %      Eosinophils Relative 7 %      Basophils Relative 1 %      Neutrophils Absolute 4 15 Thousands/µL      Immature Grans Absolute 0 02 Thousand/uL      Lymphocytes Absolute 1 60 Thousands/µL      Monocytes Absolute 0 85 Thousand/µL      Eosinophils Absolute 0 47 Thousand/µL      Basophils Absolute 0 04 Thousands/µL                  XR chest 2 views   ED Interpretation by Fredy Alvarez MD (04/06 2870)   Right lower lung opacity concerning for PNA                 Procedures  Procedures ED Course  ED Course as of 04/06/23 2009   Thu Apr 06, 2023   1340 CBC and differential(!)  No leukocytosis or anemia, very very mild thrombocytopenia not significantly different than his baseline platelet count  1448 CO2(!!): >45  History of advanced COPD, baseline from previous labs                               SBIRT 20yo+    Flowsheet Row Most Recent Value   SBIRT (23 yo +)    In order to provide better care to our patients, we are screening all of our patients for alcohol and drug use  Would it be okay to ask you these screening questions? No Filed at: 04/06/2023 5107                    Medical Decision Making  Patient presenting with a cough that is currently being treated outpatient as a possible pneumonia  Given Ri presentation, labs and chest x-ray ordered to rule out pleural effusion versus worsening pneumonia  He clinically appears stable on his home oxygen rate  Chart review shows that the patient was placed on a steroid burst and on azithromycin to treat a possible pneumonia or COPD exacerbation  If concern for pneumonia remains, patient does appear to currently be on monotherapy, and given his extensive medical history should likely be on a combination therapy  If no other etiology identified on exam, and patient remains stable for discharge home, will add second antibiotic at time of discharge  Does have a documented history of allergies to penicillins, however given the extremely low risk of any cross-reactivity with a cephalosporin, at this time I do feel that the benefits of appropriately treating this patient's respiratory illness far outweigh any theoretical low risk associated with this drug  Amount and/or Complexity of Data Reviewed  Labs: ordered  Decision-making details documented in ED Course  Radiology: ordered and independent interpretation performed  Risk  Prescription drug management            Disposition  Final diagnoses:   Pneumonia of right lung due to infectious organism, unspecified part of lung     Time reflects when diagnosis was documented in both MDM as applicable and the Disposition within this note     Time User Action Codes Description Comment    4/6/2023  2:49 PM [address] Add [J18 9] Pneumonia of left lung due to infectious organism, unspecified part of lung     4/6/2023  2:51 PM [address] Remove [J18 9] Pneumonia of left lung due to infectious organism, unspecified part of lung     4/6/2023  2:51 PM [address] Add [J18 9] Pneumonia of right lung due to infectious organism, unspecified part of lung       ED Disposition     ED Disposition   Discharge    Condition   Stable    Date/Time   Thu Apr 6, 2023  2:49 PM    Comment   Domenic Learn discharge to home/self care  Follow-up Information     Follow up With Specialties Details Why Contact Info    Héctor Casas MD Family Medicine Schedule an appointment as soon as possible for a visit in 1 week For follow up & reevaluation [address] [place] [address]  [address] [place] [address]  [phone]            Discharge Medication List as of 4/6/2023  3:40 PM      START taking these medications    Details   cefpodoxime (VANTIN) 200 mg tablet Take 1 tablet (200 mg total) by mouth 2 (two) times a day for 5 days, Starting Thu 4/6/2023, Until Tue 4/11/2023, Normal         CONTINUE these medications which have NOT CHANGED    Details   acetylcysteine (MUCOMYST) 10 % nebulizer solution Take 4 mL by nebulization every 4 (four) hours, Starting Wed 4/5/2023, Normal      albuterol (PROVENTIL HFA,VENTOLIN HFA) 90 mcg/act inhaler Inhale 2 puffs every 6 (six) hours as needed for wheezing, Starting Wed 4/5/2023, Normal      azithromycin (ZITHROMAX) 250 mg tablet Take 2 tablets today then 1 tablet daily x 4 days   Diana 2 tableta hoy, despues 1 tableta cada dalila x 4 jaqruin , Normal      docusate sodium (COLACE) 100 mg capsule Take 1 capsule (100 mg total) by mouth every 12 (twelve) hours, Starting PAZ Montano 4/5/2023, Normal      ibuprofen (MOTRIN) 400 mg tablet Take 1 tablet (400 mg total) by mouth every 8 (eight) hours as needed (pain), Starting Wed 4/5/2023, Normal      Incruse Ellipta 62 5 MCG/ACT AEPB inhaler Inhale 1 puff daily, Starting Wed 4/5/2023, Normal      ketorolac (ACULAR) 0 5 % ophthalmic solution Administer 1 drop to both eyes 4 (four) times a day, Starting Wed 4/5/2023, Normal      losartan (COZAAR) 25 mg tablet Take 1 tablet (25 mg total) by mouth daily, Starting Wed 4/5/2023, Normal      ofloxacin (OCUFLOX) 0 3 % ophthalmic solution Apply 1 drop to eye 4 (four) times a day, Starting Wed 4/5/2023, Normal      pantoprazole (PROTONIX) 40 mg tablet Take 1 tablet (40 mg total) by mouth daily, Starting Wed 4/5/2023, Normal      prednisoLONE acetate (PRED FORTE) 1 % ophthalmic suspension Apply 1 drop to eye 3 (three) times a day, Starting Wed 4/5/2023, Normal      !! predniSONE 20 mg tablet Take 1 tablet (20 mg total) by mouth daily Diana 1 tableta cada dalila, Starting Tue 4/4/2023, Normal      !! predniSONE 5 mg tablet Take 1 tablet (5 mg total) by mouth daily, Starting Wed 4/5/2023, Normal      tiotropium-olodaterol (Stiolto Respimat) 2 5-2 5 MCG/ACT inhaler Inhale 2 puffs daily, Starting Wed 4/5/2023, Normal       !! - Potential duplicate medications found  Please discuss with provider  No discharge procedures on file      PDMP Review     None          ED Provider  Electronically Signed by           Fredy Alvarez MD  04/06/23 2009

## 2023-04-17 PROBLEM — E11.9 TYPE 2 DIABETES MELLITUS WITHOUT COMPLICATION, WITHOUT LONG-TERM CURRENT USE OF INSULIN (HCC): Status: ACTIVE | Noted: 2023-04-17

## 2023-04-17 PROBLEM — E55.9 VITAMIN D DEFICIENCY: Status: ACTIVE | Noted: 2023-04-17

## 2023-04-17 PROBLEM — Z76.89 ENCOUNTER FOR SUPPORT AND COORDINATION OF TRANSITION OF CARE: Status: ACTIVE | Noted: 2023-04-17

## 2023-04-17 PROBLEM — E53.8 VITAMIN B12 DEFICIENCY: Status: ACTIVE | Noted: 2023-04-17

## 2023-04-26 ENCOUNTER — OFFICE VISIT (OUTPATIENT)
Dept: FAMILY MEDICINE CLINIC | Facility: CLINIC | Age: 88
End: 2023-04-26

## 2023-04-26 VITALS
BODY MASS INDEX: 24.45 KG/M2 | HEART RATE: 80 BPM | WEIGHT: 138 LBS | TEMPERATURE: 98 F | SYSTOLIC BLOOD PRESSURE: 140 MMHG | OXYGEN SATURATION: 96 % | RESPIRATION RATE: 20 BRPM | DIASTOLIC BLOOD PRESSURE: 80 MMHG | HEIGHT: 63 IN

## 2023-04-26 DIAGNOSIS — Z76.89 ENCOUNTER FOR SUPPORT AND COORDINATION OF TRANSITION OF CARE: Primary | ICD-10-CM

## 2023-04-26 DIAGNOSIS — E87.5 HYPERKALEMIA: ICD-10-CM

## 2023-04-26 DIAGNOSIS — R09.02 HYPOXEMIA: ICD-10-CM

## 2023-04-26 PROBLEM — E11.9 TYPE 2 DIABETES MELLITUS WITHOUT COMPLICATION, WITHOUT LONG-TERM CURRENT USE OF INSULIN (HCC): Status: RESOLVED | Noted: 2023-04-17 | Resolved: 2023-04-26

## 2023-04-26 NOTE — PROGRESS NOTES
TCM Call     Date and time call was made  2023 10:24 AM    Hospital care reviewed  Records reviewed    Patient was hospitialized at  [facility]    Date of Admission  23    Date of discharge  23    Diagnosis  Chronic obstructive pulmonary disease with acute exacerbation    Disposition  Home    Current Symptoms  None      TCM Call     Post hospital issues  None    Scheduled for follow up? Yes    Patients specialists  [facility]    Did you obtain your prescribed medications  Yes    Do you need help managing your prescriptions or medications  No    Is transportation to your appointment needed  No    I have advised the patient to call PCP with any new or worsening symptoms  Christopher Lopez MA    Living Arrangements  Family members    Have you fallen in the last 12 months  No    Interperter language line needed  No        Name: Lauren Yost      : 1343      MRN: [id number]  Encounter Provider: Libia Davis MD  Encounter Date: 2023   Encounter department: [facility]   Hypoxemia and a COPD patient to be managed with low oxygen flow to keep oxygen just above 90%  Trying to keep oxygen to normal level about 95% will produce the respiratory center shotted off     I explained that to the family  The event happened is the prove of above  1  Encounter for support and coordination of transition of care    2  Hyperkalemia  -     Comprehensive metabolic panel; Future    3  Hypoxemia           Subjective      HPI   After having surgical    Patient was found with hypoxemia, she was placed on high oxygen flow and then patient was not respond  He was unable to breathe    Review of Systems    Current Outpatient Medications on File Prior to Visit   Medication Sig    ketorolac (ACULAR) 0 5 % ophthalmic solution Administer 1 drop to both eyes 4 (four) times a day    losartan (COZAAR) 25 mg tablet Take 1 tablet (25 mg "total) by mouth daily    ofloxacin (OCUFLOX) 0 3 % ophthalmic solution Apply 1 drop to eye 4 (four) times a day    pantoprazole (PROTONIX) 40 mg tablet Take 1 tablet (40 mg total) by mouth daily    prednisoLONE acetate (PRED FORTE) 1 % ophthalmic suspension Apply 1 drop to eye 3 (three) times a day    tiotropium-olodaterol (Stiolto Respimat) 2 5-2 5 MCG/ACT inhaler Inhale 2 puffs daily       Objective     /80 (BP Location: Left arm, Patient Position: Sitting, Cuff Size: Standard)   Pulse 80   Temp 98 °F (36 7 °C) (Tympanic)   Resp 20   Ht 5' 3\" (1 6 m)   Wt 62 6 kg (138 lb)   SpO2 96% Comment: pt wearing oxygen 3 liters  BMI 24 45 kg/m²     Physical Exam alert    Patient is oriented x3  Heart normal rhythm without murmurs  Decreased breath sounds bilaterally  Foster Burton MD  "

## 2023-04-30 PROBLEM — R09.02 HYPOXEMIA: Status: ACTIVE | Noted: 2023-04-30

## 2023-04-30 PROBLEM — E87.5 HYPERKALEMIA: Status: ACTIVE | Noted: 2023-04-30

## 2023-05-08 ENCOUNTER — PATIENT OUTREACH (OUTPATIENT)
Dept: FAMILY MEDICINE CLINIC | Facility: CLINIC | Age: 88
End: 2023-05-08

## 2023-05-17 ENCOUNTER — TELEPHONE (OUTPATIENT)
Dept: FAMILY MEDICINE CLINIC | Facility: CLINIC | Age: 88
End: 2023-05-17

## 2023-05-17 NOTE — TELEPHONE ENCOUNTER
I called Pharmacy spoke to Hieu   Per patients medication list Provider discontinued Furosemide on 4/26/2023

## 2023-05-17 NOTE — TELEPHONE ENCOUNTER
Khalida, my name is Monique Minder  I'm calling from  [address]  I was calling on a mutual patient, Tye Garzon  The YOB: 1935  Again, the birthday is [phone]  The first name is spelled SEV as in [place] or as in "[employer] as in [place]  Now I was just calling to see if the doctor still has them on the furosemide because if they do, we do need a new prescription for that  If you have any questions for us, our phone number is [phone]  Again, our phone number is [phone]  Thank you  You received a voice mail from [facility]

## 2023-05-22 NOTE — UTILIZATION REVIEW
Inpatient Admission Authorization Request   NOTIFICATION OF INPATIENT ADMISSION/INPATIENT AUTHORIZATION REQUEST   SERVICING FACILITY:   [address] [place] [address], [facility], [facility]  Tax ID: [id number]  NPI: [id number]  Place of Service: Inpatient [address] [facility]  [address]  Place of Service Code: 24     ATTENDING PROVIDER:  Attending Name and NPI#: Holden Barkerma [[id number]]  Address: [address] [place] [address], [facility], [facility]  Phone: [phone]     UTILIZATION REVIEW CONTACT:  Vadim Lambert, Utilization   Network Utilization Review Department  Phone: [phone]  Fax: [phone]  Email: [email or web address]     PHYSICIAN ADVISORY SERVICES:  FOR [facility] REVIEW - MEDICAL NECESSITY DENIAL  Phone: [phone]  Fax: [phone]  Email: [email or web address]     TYPE OF REQUEST:  Inpatient Status     ADMISSION INFORMATION:  ADMISSION DATE/TIME: 3/12/22  6:09 PM  PATIENT DIAGNOSIS CODE/DESCRIPTION:  Swelling [R60 9]  CHF (congestive heart failure) (HCC) [I50 9]  SOB (shortness of breath) [R06 02]  Hypoxia [R09 02]  Swelling of left lower extremity [M79 89]  DISCHARGE DATE/TIME: No discharge date for patient encounter  IMPORTANT INFORMATION:  Please contact the Vadim Lambert directly with any questions or concerns regarding this request  Department voicemails are confidential     Send requests for admission clinical reviews, concurrent reviews, approvals, and administrative denials due to lack of clinical to fax [phone]  Birth Control Pills Counseling: Birth Control Pill Counseling: I discussed with the patient the potential side effects of OCPs including but not limited to increased risk of stroke, heart attack, thrombophlebitis, deep venous thrombosis, hepatic adenomas, breast changes, GI upset, headaches, and depression.  The patient verbalized understanding of the proper use and possible adverse effects of OCPs. All of the patient's questions and concerns were addressed.

## 2023-05-23 NOTE — PROGRESS NOTES
Patient's son called about the need for Wheelchair  Patient is to week and gets easily SOB even on Oxygen supplementation

## 2023-05-25 LAB

## 2023-07-10 DIAGNOSIS — H40.2231 CHRONIC PRIMARY ANGLE-CLOSURE GLAUCOMA OF BOTH EYES, MILD STAGE: ICD-10-CM

## 2023-07-10 RX ORDER — KETOROLAC TROMETHAMINE 5 MG/ML
1 SOLUTION OPHTHALMIC 4 TIMES DAILY
Qty: 5 ML | Refills: 5 | Status: ON HOLD | OUTPATIENT
Start: 2023-07-10

## 2023-07-11 DIAGNOSIS — H40.2231 CHRONIC PRIMARY ANGLE-CLOSURE GLAUCOMA OF BOTH EYES, MILD STAGE: ICD-10-CM

## 2023-07-12 RX ORDER — PREDNISOLONE ACETATE 10 MG/ML
1 SUSPENSION/ DROPS OPHTHALMIC 3 TIMES DAILY
Qty: 5 ML | Refills: 3 | Status: ON HOLD | OUTPATIENT
Start: 2023-07-12

## 2023-07-17 DIAGNOSIS — K29.50 CHRONIC GASTRITIS WITHOUT BLEEDING, UNSPECIFIED GASTRITIS TYPE: ICD-10-CM

## 2023-07-18 RX ORDER — PANTOPRAZOLE SODIUM 40 MG/1
40 TABLET, DELAYED RELEASE ORAL DAILY
Qty: 30 TABLET | Refills: 5 | Status: SHIPPED | OUTPATIENT
Start: 2023-07-18 | End: 2023-07-28

## 2023-07-21 DIAGNOSIS — J44.1 CHRONIC OBSTRUCTIVE PULMONARY DISEASE WITH ACUTE EXACERBATION (HCC): ICD-10-CM

## 2023-07-22 ENCOUNTER — APPOINTMENT (EMERGENCY)
Dept: CT IMAGING | Facility: HOSPITAL | Age: 88
DRG: 335 | End: 2023-07-22
Payer: MEDICARE

## 2023-07-22 ENCOUNTER — HOSPITAL ENCOUNTER (INPATIENT)
Facility: HOSPITAL | Age: 88
LOS: 4 days | Discharge: HOME HEALTH | DRG: 335 | End: 2023-07-26
Attending: SURGERY | Admitting: SURGERY
Payer: MEDICARE

## 2023-07-22 ENCOUNTER — ANESTHESIA EVENT (INPATIENT)
Dept: PERIOP | Facility: HOSPITAL | Age: 88
DRG: 335 | End: 2023-07-22
Payer: MEDICARE

## 2023-07-22 ENCOUNTER — APPOINTMENT (EMERGENCY)
Dept: RADIOLOGY | Facility: HOSPITAL | Age: 88
DRG: 335 | End: 2023-07-22
Payer: MEDICARE

## 2023-07-22 ENCOUNTER — HOSPITAL ENCOUNTER (EMERGENCY)
Facility: HOSPITAL | Age: 88
DRG: 335 | End: 2023-07-22
Attending: EMERGENCY MEDICINE
Payer: MEDICARE

## 2023-07-22 ENCOUNTER — ANESTHESIA (INPATIENT)
Dept: PERIOP | Facility: HOSPITAL | Age: 88
DRG: 335 | End: 2023-07-22
Payer: MEDICARE

## 2023-07-22 VITALS
OXYGEN SATURATION: 96 % | DIASTOLIC BLOOD PRESSURE: 103 MMHG | BODY MASS INDEX: 22.92 KG/M2 | WEIGHT: 129.41 LBS | SYSTOLIC BLOOD PRESSURE: 178 MMHG | TEMPERATURE: 98.3 F | HEART RATE: 87 BPM | RESPIRATION RATE: 18 BRPM

## 2023-07-22 DIAGNOSIS — K56.609 SBO (SMALL BOWEL OBSTRUCTION) (HCC): Primary | ICD-10-CM

## 2023-07-22 DIAGNOSIS — N20.0 KIDNEY STONE: ICD-10-CM

## 2023-07-22 DIAGNOSIS — N18.31 CHRONIC KIDNEY DISEASE, STAGE 3A (HCC): ICD-10-CM

## 2023-07-22 DIAGNOSIS — N20.1 RIGHT URETERAL STONE: ICD-10-CM

## 2023-07-22 DIAGNOSIS — K56.609 SMALL BOWEL OBSTRUCTION (HCC): Primary | ICD-10-CM

## 2023-07-22 DIAGNOSIS — J44.1 CHRONIC OBSTRUCTIVE PULMONARY DISEASE WITH ACUTE EXACERBATION (HCC): ICD-10-CM

## 2023-07-22 DIAGNOSIS — I50.32 CHRONIC DIASTOLIC CHF (CONGESTIVE HEART FAILURE) (HCC): ICD-10-CM

## 2023-07-22 DIAGNOSIS — K46.0 INCARCERATED HERNIA: ICD-10-CM

## 2023-07-22 DIAGNOSIS — J44.9 COPD WITHOUT EXACERBATION (HCC): ICD-10-CM

## 2023-07-22 LAB
2HR DELTA HS TROPONIN: 4 NG/L
ABO GROUP BLD: NORMAL
ALBUMIN SERPL BCP-MCNC: 4.2 G/DL (ref 3.5–5)
ALP SERPL-CCNC: 69 U/L (ref 34–104)
ALT SERPL W P-5'-P-CCNC: 11 U/L (ref 7–52)
ANION GAP SERPL CALCULATED.3IONS-SCNC: 4 MMOL/L
APTT PPP: 32 SECONDS (ref 23–37)
ARTERIAL PATENCY WRIST A: YES
AST SERPL W P-5'-P-CCNC: 20 U/L (ref 13–39)
ATRIAL RATE: 80 BPM
ATRIAL RATE: 82 BPM
BASE EXCESS BLDA CALC-SCNC: 10.3 MMOL/L
BASOPHILS # BLD AUTO: 0.03 THOUSANDS/ÂΜL (ref 0–0.1)
BASOPHILS NFR BLD AUTO: 0 % (ref 0–1)
BILIRUB SERPL-MCNC: 0.81 MG/DL (ref 0.2–1)
BILIRUB UR QL STRIP: NEGATIVE
BLD GP AB SCN SERPL QL: NEGATIVE
BNP SERPL-MCNC: 75 PG/ML (ref 0–100)
BUN SERPL-MCNC: 16 MG/DL (ref 5–25)
CALCIUM SERPL-MCNC: 10.1 MG/DL (ref 8.4–10.2)
CARDIAC TROPONIN I PNL SERPL HS: 16 NG/L
CARDIAC TROPONIN I PNL SERPL HS: 20 NG/L
CHLORIDE SERPL-SCNC: 95 MMOL/L (ref 96–108)
CLARITY UR: ABNORMAL
CO2 SERPL-SCNC: 43 MMOL/L (ref 21–32)
COLOR UR: ABNORMAL
CREAT SERPL-MCNC: 0.96 MG/DL (ref 0.6–1.3)
EOSINOPHIL # BLD AUTO: 0.38 THOUSAND/ÂΜL (ref 0–0.61)
EOSINOPHIL NFR BLD AUTO: 5 % (ref 0–6)
ERYTHROCYTE [DISTWIDTH] IN BLOOD BY AUTOMATED COUNT: 13.2 % (ref 11.6–15.1)
GFR SERPL CREATININE-BSD FRML MDRD: 71 ML/MIN/1.73SQ M
GLUCOSE SERPL-MCNC: 109 MG/DL (ref 65–140)
GLUCOSE UR STRIP-MCNC: NEGATIVE MG/DL
HCO3 BLDA-SCNC: 35.9 MMOL/L (ref 22–28)
HCT VFR BLD AUTO: 52.2 % (ref 36.5–49.3)
HGB BLD-MCNC: 15.5 G/DL (ref 12–17)
HGB UR QL STRIP.AUTO: NEGATIVE
HOROWITZ INDEX BLDA+IHG-RTO: 50 MM[HG]
IMM GRANULOCYTES # BLD AUTO: 0.02 THOUSAND/UL (ref 0–0.2)
IMM GRANULOCYTES NFR BLD AUTO: 0 % (ref 0–2)
INR PPP: 0.98 (ref 0.84–1.19)
KETONES UR STRIP-MCNC: NEGATIVE MG/DL
LEUKOCYTE ESTERASE UR QL STRIP: NEGATIVE
LIPASE SERPL-CCNC: 20 U/L (ref 11–82)
LYMPHOCYTES # BLD AUTO: 0.97 THOUSANDS/ÂΜL (ref 0.6–4.47)
LYMPHOCYTES NFR BLD AUTO: 13 % (ref 14–44)
MCH RBC QN AUTO: 30.6 PG (ref 26.8–34.3)
MCHC RBC AUTO-ENTMCNC: 29.7 G/DL (ref 31.4–37.4)
MCV RBC AUTO: 103 FL (ref 82–98)
MONOCYTES # BLD AUTO: 0.77 THOUSAND/ÂΜL (ref 0.17–1.22)
MONOCYTES NFR BLD AUTO: 10 % (ref 4–12)
NEUTROPHILS # BLD AUTO: 5.43 THOUSANDS/ÂΜL (ref 1.85–7.62)
NEUTS SEG NFR BLD AUTO: 72 % (ref 43–75)
NITRITE UR QL STRIP: NEGATIVE
NRBC BLD AUTO-RTO: 0 /100 WBCS
O2 CT BLDA-SCNC: 18.9 ML/DL (ref 16–23)
OXYHGB MFR BLDA: 94.7 % (ref 94–97)
P AXIS: 70 DEGREES
P AXIS: 71 DEGREES
PCO2 BLDA: 51.4 MM HG (ref 36–44)
PEEP RESPIRATORY: 6 CM[H2O]
PH BLDA: 7.46 [PH] (ref 7.35–7.45)
PH UR STRIP.AUTO: 8 [PH]
PLATELET # BLD AUTO: 128 THOUSANDS/UL (ref 149–390)
PMV BLD AUTO: 9.3 FL (ref 8.9–12.7)
PO2 BLDA: 77.1 MM HG (ref 75–129)
POTASSIUM SERPL-SCNC: 4.3 MMOL/L (ref 3.5–5.3)
PR INTERVAL: 144 MS
PR INTERVAL: 146 MS
PROT SERPL-MCNC: 7.7 G/DL (ref 6.4–8.4)
PROT UR STRIP-MCNC: NEGATIVE MG/DL
PROTHROMBIN TIME: 13.3 SECONDS (ref 11.6–14.5)
QRS AXIS: 52 DEGREES
QRS AXIS: 54 DEGREES
QRSD INTERVAL: 84 MS
QRSD INTERVAL: 86 MS
QT INTERVAL: 374 MS
QT INTERVAL: 378 MS
QTC INTERVAL: 435 MS
QTC INTERVAL: 436 MS
RBC # BLD AUTO: 5.06 MILLION/UL (ref 3.88–5.62)
RH BLD: POSITIVE
SODIUM SERPL-SCNC: 142 MMOL/L (ref 135–147)
SP GR UR STRIP.AUTO: 1.02 (ref 1–1.04)
SPECIMEN EXPIRATION DATE: NORMAL
SPECIMEN SOURCE: ABNORMAL
T WAVE AXIS: 64 DEGREES
T WAVE AXIS: 64 DEGREES
UROBILINOGEN UA: NEGATIVE MG/DL
VENT AC: 16
VENT- AC: AC
VENTRICULAR RATE: 80 BPM
VENTRICULAR RATE: 82 BPM
VT SETTING VENT: 400 ML
WBC # BLD AUTO: 7.6 THOUSAND/UL (ref 4.31–10.16)

## 2023-07-22 PROCEDURE — 88305 TISSUE EXAM BY PATHOLOGIST: CPT | Performed by: PATHOLOGY

## 2023-07-22 PROCEDURE — 85025 COMPLETE CBC W/AUTO DIFF WBC: CPT | Performed by: EMERGENCY MEDICINE

## 2023-07-22 PROCEDURE — 93010 ELECTROCARDIOGRAM REPORT: CPT | Performed by: INTERNAL MEDICINE

## 2023-07-22 PROCEDURE — 82805 BLOOD GASES W/O2 SATURATION: CPT | Performed by: NURSE PRACTITIONER

## 2023-07-22 PROCEDURE — 36415 COLL VENOUS BLD VENIPUNCTURE: CPT | Performed by: EMERGENCY MEDICINE

## 2023-07-22 PROCEDURE — 74177 CT ABD & PELVIS W/CONTRAST: CPT

## 2023-07-22 PROCEDURE — 83690 ASSAY OF LIPASE: CPT | Performed by: EMERGENCY MEDICINE

## 2023-07-22 PROCEDURE — 0VT90ZZ RESECTION OF RIGHT TESTIS, OPEN APPROACH: ICD-10-PCS | Performed by: SURGERY

## 2023-07-22 PROCEDURE — 99222 1ST HOSP IP/OBS MODERATE 55: CPT | Performed by: SURGERY

## 2023-07-22 PROCEDURE — 0DNW0ZZ RELEASE PERITONEUM, OPEN APPROACH: ICD-10-PCS | Performed by: SURGERY

## 2023-07-22 PROCEDURE — 85730 THROMBOPLASTIN TIME PARTIAL: CPT | Performed by: EMERGENCY MEDICINE

## 2023-07-22 PROCEDURE — 0YU50JZ SUPPLEMENT RIGHT INGUINAL REGION WITH SYNTHETIC SUBSTITUTE, OPEN APPROACH: ICD-10-PCS | Performed by: SURGERY

## 2023-07-22 PROCEDURE — 94760 N-INVAS EAR/PLS OXIMETRY 1: CPT

## 2023-07-22 PROCEDURE — 99285 EMERGENCY DEPT VISIT HI MDM: CPT

## 2023-07-22 PROCEDURE — 80053 COMPREHEN METABOLIC PANEL: CPT | Performed by: EMERGENCY MEDICINE

## 2023-07-22 PROCEDURE — 96374 THER/PROPH/DIAG INJ IV PUSH: CPT

## 2023-07-22 PROCEDURE — C1781 MESH (IMPLANTABLE): HCPCS | Performed by: SURGERY

## 2023-07-22 PROCEDURE — 83880 ASSAY OF NATRIURETIC PEPTIDE: CPT | Performed by: EMERGENCY MEDICINE

## 2023-07-22 PROCEDURE — 49521 REREPAIR ING HERNIA BLOCKED: CPT | Performed by: SURGERY

## 2023-07-22 PROCEDURE — 88302 TISSUE EXAM BY PATHOLOGIST: CPT | Performed by: PATHOLOGY

## 2023-07-22 PROCEDURE — 86850 RBC ANTIBODY SCREEN: CPT | Performed by: EMERGENCY MEDICINE

## 2023-07-22 PROCEDURE — 99291 CRITICAL CARE FIRST HOUR: CPT | Performed by: INTERNAL MEDICINE

## 2023-07-22 PROCEDURE — 99285 EMERGENCY DEPT VISIT HI MDM: CPT | Performed by: EMERGENCY MEDICINE

## 2023-07-22 PROCEDURE — G1004 CDSM NDSC: HCPCS

## 2023-07-22 PROCEDURE — C9113 INJ PANTOPRAZOLE SODIUM, VIA: HCPCS | Performed by: INTERNAL MEDICINE

## 2023-07-22 PROCEDURE — 93005 ELECTROCARDIOGRAM TRACING: CPT

## 2023-07-22 PROCEDURE — 94002 VENT MGMT INPAT INIT DAY: CPT

## 2023-07-22 PROCEDURE — 86901 BLOOD TYPING SEROLOGIC RH(D): CPT | Performed by: EMERGENCY MEDICINE

## 2023-07-22 PROCEDURE — 71045 X-RAY EXAM CHEST 1 VIEW: CPT

## 2023-07-22 PROCEDURE — 36600 WITHDRAWAL OF ARTERIAL BLOOD: CPT

## 2023-07-22 PROCEDURE — 54520 REMOVAL OF TESTIS: CPT | Performed by: SURGERY

## 2023-07-22 PROCEDURE — 96375 TX/PRO/DX INJ NEW DRUG ADDON: CPT

## 2023-07-22 PROCEDURE — 85610 PROTHROMBIN TIME: CPT | Performed by: EMERGENCY MEDICINE

## 2023-07-22 PROCEDURE — 84484 ASSAY OF TROPONIN QUANT: CPT | Performed by: EMERGENCY MEDICINE

## 2023-07-22 PROCEDURE — 86900 BLOOD TYPING SEROLOGIC ABO: CPT | Performed by: EMERGENCY MEDICINE

## 2023-07-22 DEVICE — BARD MESH PERFIX PLUG, SMALL
Type: IMPLANTABLE DEVICE | Site: GROIN | Status: FUNCTIONAL
Brand: BARD MESH PERFIX PLUG

## 2023-07-22 RX ORDER — LIDOCAINE HYDROCHLORIDE 20 MG/ML
INJECTION, SOLUTION EPIDURAL; INFILTRATION; INTRACAUDAL; PERINEURAL AS NEEDED
Status: DISCONTINUED | OUTPATIENT
Start: 2023-07-22 | End: 2023-07-22

## 2023-07-22 RX ORDER — CHLORHEXIDINE GLUCONATE 0.12 MG/ML
15 RINSE ORAL EVERY 12 HOURS SCHEDULED
Status: DISCONTINUED | OUTPATIENT
Start: 2023-07-22 | End: 2023-07-24

## 2023-07-22 RX ORDER — MAGNESIUM HYDROXIDE 1200 MG/15ML
LIQUID ORAL AS NEEDED
Status: DISCONTINUED | OUTPATIENT
Start: 2023-07-22 | End: 2023-07-22 | Stop reason: HOSPADM

## 2023-07-22 RX ORDER — MORPHINE SULFATE 4 MG/ML
4 INJECTION, SOLUTION INTRAMUSCULAR; INTRAVENOUS ONCE
Status: COMPLETED | OUTPATIENT
Start: 2023-07-22 | End: 2023-07-22

## 2023-07-22 RX ORDER — ALBUTEROL SULFATE 90 UG/1
AEROSOL, METERED RESPIRATORY (INHALATION) AS NEEDED
Status: DISCONTINUED | OUTPATIENT
Start: 2023-07-22 | End: 2023-07-22

## 2023-07-22 RX ORDER — ENOXAPARIN SODIUM 100 MG/ML
40 INJECTION SUBCUTANEOUS DAILY
Status: DISCONTINUED | OUTPATIENT
Start: 2023-07-22 | End: 2023-07-26 | Stop reason: HOSPADM

## 2023-07-22 RX ORDER — DEXAMETHASONE SODIUM PHOSPHATE 10 MG/ML
INJECTION, SOLUTION INTRAMUSCULAR; INTRAVENOUS AS NEEDED
Status: DISCONTINUED | OUTPATIENT
Start: 2023-07-22 | End: 2023-07-22

## 2023-07-22 RX ORDER — CEFAZOLIN SODIUM 1 G/3ML
INJECTION, POWDER, FOR SOLUTION INTRAMUSCULAR; INTRAVENOUS AS NEEDED
Status: DISCONTINUED | OUTPATIENT
Start: 2023-07-22 | End: 2023-07-22

## 2023-07-22 RX ORDER — HYDROMORPHONE HCL/PF 1 MG/ML
0.2 SYRINGE (ML) INJECTION ONCE AS NEEDED
Status: DISCONTINUED | OUTPATIENT
Start: 2023-07-22 | End: 2023-07-22 | Stop reason: HOSPADM

## 2023-07-22 RX ORDER — SUCCINYLCHOLINE/SOD CL,ISO/PF 100 MG/5ML
SYRINGE (ML) INTRAVENOUS AS NEEDED
Status: DISCONTINUED | OUTPATIENT
Start: 2023-07-22 | End: 2023-07-22

## 2023-07-22 RX ORDER — ONDANSETRON 2 MG/ML
4 INJECTION INTRAMUSCULAR; INTRAVENOUS ONCE
Status: COMPLETED | OUTPATIENT
Start: 2023-07-22 | End: 2023-07-22

## 2023-07-22 RX ORDER — ROCURONIUM BROMIDE 10 MG/ML
INJECTION, SOLUTION INTRAVENOUS AS NEEDED
Status: DISCONTINUED | OUTPATIENT
Start: 2023-07-22 | End: 2023-07-22

## 2023-07-22 RX ORDER — HYDROMORPHONE HCL/PF 1 MG/ML
0.5 SYRINGE (ML) INJECTION ONCE AS NEEDED
Status: DISCONTINUED | OUTPATIENT
Start: 2023-07-22 | End: 2023-07-22

## 2023-07-22 RX ORDER — SODIUM CHLORIDE, SODIUM LACTATE, POTASSIUM CHLORIDE, CALCIUM CHLORIDE 600; 310; 30; 20 MG/100ML; MG/100ML; MG/100ML; MG/100ML
50 INJECTION, SOLUTION INTRAVENOUS CONTINUOUS
Status: DISCONTINUED | OUTPATIENT
Start: 2023-07-22 | End: 2023-07-24

## 2023-07-22 RX ORDER — EPHEDRINE SULFATE 50 MG/ML
INJECTION INTRAVENOUS AS NEEDED
Status: DISCONTINUED | OUTPATIENT
Start: 2023-07-22 | End: 2023-07-22

## 2023-07-22 RX ORDER — PROPOFOL 10 MG/ML
5-50 INJECTION, EMULSION INTRAVENOUS
Status: DISCONTINUED | OUTPATIENT
Start: 2023-07-22 | End: 2023-07-23

## 2023-07-22 RX ORDER — PANTOPRAZOLE SODIUM 40 MG/10ML
40 INJECTION, POWDER, LYOPHILIZED, FOR SOLUTION INTRAVENOUS
Status: DISCONTINUED | OUTPATIENT
Start: 2023-07-22 | End: 2023-07-24

## 2023-07-22 RX ORDER — PROPOFOL 10 MG/ML
INJECTION, EMULSION INTRAVENOUS AS NEEDED
Status: DISCONTINUED | OUTPATIENT
Start: 2023-07-22 | End: 2023-07-22

## 2023-07-22 RX ORDER — FENTANYL CITRATE 50 UG/ML
INJECTION, SOLUTION INTRAMUSCULAR; INTRAVENOUS AS NEEDED
Status: DISCONTINUED | OUTPATIENT
Start: 2023-07-22 | End: 2023-07-22

## 2023-07-22 RX ORDER — CHLORHEXIDINE GLUCONATE 0.12 MG/ML
15 RINSE ORAL EVERY 12 HOURS SCHEDULED
Status: DISCONTINUED | OUTPATIENT
Start: 2023-07-22 | End: 2023-07-22

## 2023-07-22 RX ORDER — BUPIVACAINE HYDROCHLORIDE 2.5 MG/ML
INJECTION, SOLUTION EPIDURAL; INFILTRATION; INTRACAUDAL AS NEEDED
Status: DISCONTINUED | OUTPATIENT
Start: 2023-07-22 | End: 2023-07-22 | Stop reason: HOSPADM

## 2023-07-22 RX ADMIN — EPHEDRINE SULFATE 10 MG: 50 INJECTION, SOLUTION INTRAVENOUS at 11:06

## 2023-07-22 RX ADMIN — PROPOFOL 15 MCG/KG/MIN: 10 INJECTION, EMULSION INTRAVENOUS at 23:11

## 2023-07-22 RX ADMIN — ALBUTEROL SULFATE 2 PUFF: 90 AEROSOL, METERED RESPIRATORY (INHALATION) at 10:06

## 2023-07-22 RX ADMIN — PHENYLEPHRINE HYDROCHLORIDE 50 MCG/MIN: 10 INJECTION INTRAVENOUS at 10:22

## 2023-07-22 RX ADMIN — EPHEDRINE SULFATE 10 MG: 50 INJECTION, SOLUTION INTRAVENOUS at 11:38

## 2023-07-22 RX ADMIN — ROCURONIUM BROMIDE 40 MG: 10 INJECTION, SOLUTION INTRAVENOUS at 10:15

## 2023-07-22 RX ADMIN — FENTANYL CITRATE 25 MCG: 50 INJECTION INTRAMUSCULAR; INTRAVENOUS at 12:14

## 2023-07-22 RX ADMIN — SODIUM CHLORIDE, SODIUM LACTATE, POTASSIUM CHLORIDE, AND CALCIUM CHLORIDE 50 ML/HR: .6; .31; .03; .02 INJECTION, SOLUTION INTRAVENOUS at 08:38

## 2023-07-22 RX ADMIN — LIDOCAINE HYDROCHLORIDE 80 MG: 20 INJECTION, SOLUTION EPIDURAL; INFILTRATION; INTRACAUDAL at 10:12

## 2023-07-22 RX ADMIN — HYDROMORPHONE HYDROCHLORIDE 0.2 MG: 1 INJECTION, SOLUTION INTRAMUSCULAR; INTRAVENOUS; SUBCUTANEOUS at 05:33

## 2023-07-22 RX ADMIN — ONDANSETRON 4 MG: 2 INJECTION INTRAMUSCULAR; INTRAVENOUS at 03:28

## 2023-07-22 RX ADMIN — PROPOFOL 100 MG: 10 INJECTION, EMULSION INTRAVENOUS at 10:12

## 2023-07-22 RX ADMIN — FENTANYL CITRATE 25 MCG: 50 INJECTION INTRAMUSCULAR; INTRAVENOUS at 10:34

## 2023-07-22 RX ADMIN — EPHEDRINE SULFATE 10 MG: 50 INJECTION, SOLUTION INTRAVENOUS at 11:19

## 2023-07-22 RX ADMIN — FENTANYL CITRATE 25 MCG: 50 INJECTION INTRAMUSCULAR; INTRAVENOUS at 10:50

## 2023-07-22 RX ADMIN — CHLORHEXIDINE GLUCONATE 15 ML: 1.2 RINSE ORAL at 22:14

## 2023-07-22 RX ADMIN — FENTANYL CITRATE 25 MCG: 50 INJECTION INTRAMUSCULAR; INTRAVENOUS at 10:46

## 2023-07-22 RX ADMIN — SODIUM CHLORIDE, SODIUM LACTATE, POTASSIUM CHLORIDE, AND CALCIUM CHLORIDE: .6; .31; .03; .02 INJECTION, SOLUTION INTRAVENOUS at 11:06

## 2023-07-22 RX ADMIN — FENTANYL CITRATE 25 MCG: 50 INJECTION INTRAMUSCULAR; INTRAVENOUS at 10:55

## 2023-07-22 RX ADMIN — PANTOPRAZOLE SODIUM 40 MG: 40 INJECTION, POWDER, FOR SOLUTION INTRAVENOUS at 17:04

## 2023-07-22 RX ADMIN — CHLORHEXIDINE GLUCONATE 15 ML: 1.2 RINSE ORAL at 17:03

## 2023-07-22 RX ADMIN — SODIUM CHLORIDE, SODIUM LACTATE, POTASSIUM CHLORIDE, AND CALCIUM CHLORIDE 50 ML/HR: .6; .31; .03; .02 INJECTION, SOLUTION INTRAVENOUS at 19:42

## 2023-07-22 RX ADMIN — MORPHINE SULFATE 4 MG: 4 INJECTION, SOLUTION INTRAMUSCULAR; INTRAVENOUS at 03:29

## 2023-07-22 RX ADMIN — Medication 100 MG: at 10:12

## 2023-07-22 RX ADMIN — PROPOFOL 20 MCG/KG/MIN: 10 INJECTION, EMULSION INTRAVENOUS at 17:04

## 2023-07-22 RX ADMIN — CEFAZOLIN 1000 MG: 1 INJECTION, POWDER, FOR SOLUTION INTRAMUSCULAR; INTRAVENOUS at 10:21

## 2023-07-22 RX ADMIN — DEXAMETHASONE SODIUM PHOSPHATE 5 MG: 10 INJECTION INTRAMUSCULAR; INTRAVENOUS at 10:20

## 2023-07-22 RX ADMIN — ENOXAPARIN SODIUM 40 MG: 40 INJECTION SUBCUTANEOUS at 17:04

## 2023-07-22 RX ADMIN — IOHEXOL 100 ML: 350 INJECTION, SOLUTION INTRAVENOUS at 04:55

## 2023-07-22 RX ADMIN — EPHEDRINE SULFATE 5 MG: 50 INJECTION, SOLUTION INTRAVENOUS at 11:07

## 2023-07-22 NOTE — ED PROVIDER NOTES
History  Chief Complaint   Patient presents with   • Abdominal Pain     Pt reports diffuse abdominal pain, nausea, and vomiting that started this morning. Pt has had decrease in appetite. Pt reports he has had similar pain with his hernia. HPI    83 yo M hx of thoracic aortic aneurysm, CAD CHF COPD on 2 L at baseline, DM DVT PVD, incarcerated hernia, presents to ed for eval of abd pain. Onset: yesterday am  Duration: constant  Pain currently: 7  Pain meds taken: tylenol 1 pm  Prior similar pain: yes  Where: lower abdomen, over his hernia  Radiation: no  Associated N/V: yes  Emesis: NBNB  Last BM: normal BM, around 1 pm  Urinary complaints:  no    Prior Surgeries   Hernia repair. Per son had R inguinal hernia repair, but the surgeon won't repair his hernia again due his hx of COPD    Previous Imaging   abd pelvis 12/30/22  Persistent small bowel obstruction with transition point in right inguinal hernia, similar to prior examination. Persistent mild submucosal edema of small bowel loop in the right inguinal hernia with surrounding ascites. Recommend correlation with   physical examination and general surgery consultation.     Partially imaged 4.3 cm aneurysmal dilatation of descending thoracic aorta status post TEVAR. Recommend continued yearly follow-up.     Bilateral subcentimeter nonobstructing renal calculi.     Small volume ascites.     Additional chronic/incidental findings as detailed above. Alcohol intake: denies  Drugs: denies    Patient is full code    Prior to Admission Medications   Prescriptions Last Dose Informant Patient Reported? Taking?    albuterol (PROVENTIL HFA,VENTOLIN HFA) 90 mcg/act inhaler   Yes No   Sig: Inhale 2 puffs every 6 (six) hours as needed   brimonidine tartrate 0.2 % ophthalmic solution   Yes No   hydroxypropyl methylcellulose (GONAK) 2.5 % ophthalmic solution   Yes No   Sig: Apply 1 drop to eye Three times a day   ketorolac (ACULAR) 0.5 % ophthalmic solution   No No Sig:  ADMINISTER 1 DROP TO BOTH EYES 4 (FOUR) TIMES A DAY   losartan (COZAAR) 25 mg tablet   No No   Sig: Take 1 tablet (25 mg total) by mouth daily   ofloxacin (OCUFLOX) 0.3 % ophthalmic solution   No No   Sig: Apply 1 drop to eye 4 (four) times a day   pantoprazole (PROTONIX) 40 mg tablet   No No   Sig: TAKE 1 TABLET (40 MG TOTAL) BY MOUTH DAILY   prednisoLONE acetate (PRED FORTE) 1 % ophthalmic suspension   No No   Sig: APPLY 1 DROP TO EYE 3 (THREE) TIMES A DAY   tiotropium-olodaterol (Stiolto Respimat) 2.5-2.5 MCG/ACT inhaler   No No   Sig: Inhale 2 puffs daily      Facility-Administered Medications: None       Past Medical History:   Diagnosis Date   • Acute metabolic encephalopathy 23/17/9806   • Anemia    • Aneurysm, aorta, thoracic (HCC)    • Appendicolith    • Ascending aortic aneurysm ([facility])     3.7   • Asthma    • BPH (benign prostatic hyperplasia)    • CAD (coronary artery disease)     noted on CT scan   • CHF (congestive heart failure) ([facility])    • COPD (chronic obstructive pulmonary disease) ([facility])    • Descending thoracic aortic aneurysm ([facility])    • Diabetes mellitus ([facility])    • Diverticulosis    • Former tobacco use    • GERD (gastroesophageal reflux disease)    • History of DVT (deep vein thrombosis)     Left leg   • History of transfusion    • Hypertension    • Inguinal hernia     right   • Nephrolithiasis    • Oxygen dependent     2LNC   • Oxygen dependent    • Pneumonia    • Pre-diabetes    • Prostate calculus    • PVD (peripheral vascular disease) ([facility])    • Thoracic aortic aneurysm without rupture ([address]) 11/19/2018    Added automatically from request for surgery [id number]   • Thrombocytopenia ([address]) 12/29/2018   • Ulcer    • 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[facility])    • Varicose vein of leg     b/l       Past Surgical History:   Procedure Laterality Date   • CARDIAC SURGERY     • ESOPHAGOGASTRODUODENOSCOPY     • HERNIA REPAIR Right 1/21/2019    Procedure: REPAIR HERNIA INGUINAL WITH MESH;  Surgeon: Lucille Bonilla MD;  Location: [facility] MAIN OR;  Service: General   • INGUINAL HERNIA REPAIR Bilateral    • IR TEVAR  2018   • NV EVASC RPR DTA COVERAGE ART ORIGIN 1ST ENDOPROSTH N/A 2018    Procedure: TEVAR - endovascular thoracic aortic aneurysm repair;  Surgeon: Yari Coleman MD;  Location:  MAIN OR;  Service: Vascular   • THORACIC AORTIC ANEURYSM REPAIR  2018   • VARICOSE VEIN SURGERY Bilateral     vein stripping       Family History   Problem Relation Age of Onset   • Tuberculosis Mother    • No Known Problems Father    • Cancer Sister    • Diabetes Family    • Hypertension Family      I have reviewed and agree with the history as documented. E-Cigarette/Vaping   • E-Cigarette Use Never User      E-Cigarette/Vaping Substances     Social History     Tobacco Use   • Smoking status: Former     Packs/day: 1.00     Years: 35.00     Total pack years: 35.00     Types: Cigarettes     Start date:      Quit date:      Years since quittin.5   • Smokeless tobacco: Never   Vaping Use   • Vaping Use: Never used   Substance Use Topics   • Alcohol use: Never   • Drug use: No       Review of Systems   Constitutional: Negative for chills, fatigue and fever. HENT: Negative for nosebleeds and sore throat. Eyes: Negative for redness and visual disturbance. Respiratory: Negative for shortness of breath and wheezing. Cardiovascular: Negative for chest pain and palpitations. Gastrointestinal: Positive for abdominal pain, nausea and vomiting. Negative for diarrhea. Endocrine: Negative for polyuria. Genitourinary: Negative for difficulty urinating and testicular pain. Musculoskeletal: Negative for back pain and neck stiffness. Skin: Negative for rash and wound. Neurological: Negative for seizures, speech difficulty and headaches. Psychiatric/Behavioral: Negative for dysphoric mood and hallucinations. All other systems reviewed and are negative.       Physical Exam  Physical Exam  Vitals and nursing note reviewed. Constitutional:       Appearance: He is well-developed. Comments: Frail appearing   HENT:      Head: Normocephalic and atraumatic. Right Ear: External ear normal.      Left Ear: External ear normal.   Eyes:      Conjunctiva/sclera: Conjunctivae normal.   Cardiovascular:      Rate and Rhythm: Normal rate and regular rhythm. Heart sounds: Normal heart sounds. Pulmonary:      Effort: Pulmonary effort is normal.      Breath sounds: Normal breath sounds. Comments: 92% on 4 L  Abdominal:      General: There is no distension. Tenderness: There is abdominal tenderness. There is no guarding. Comments: Right Inguinal hernia, non reducible  No overlying skin changes   Musculoskeletal:         General: Normal range of motion. Cervical back: Normal range of motion. Skin:     General: Skin is warm and dry. Findings: No rash. Neurological:      Mental Status: He is alert and oriented to person, place, and time. Cranial Nerves: No cranial nerve deficit. Sensory: No sensory deficit. Motor: No abnormal muscle tone.       Coordination: Coordination normal.         Vital Signs  ED Triage Vitals   Temperature Pulse Respirations Blood Pressure SpO2   07/22/23 0218 07/22/23 0218 07/22/23 0218 07/22/23 0218 07/22/23 0218   98.3 °F (36.8 °C) 75 20 (!) 188/106 95 %      Temp Source Heart Rate Source Patient Position - Orthostatic VS BP Location FiO2 (%)   07/22/23 0218 07/22/23 0218 07/22/23 0324 07/22/23 0324 --   Tympanic Monitor Lying Right arm       Pain Score       07/22/23 0329       10 - Worst Possible Pain           Vitals:    07/22/23 0218 07/22/23 0324 07/22/23 0531   BP: (!) 188/106 (!) 180/114 (!) 178/103   Pulse: 75 82 87   Patient Position - Orthostatic VS:  Lying Lying         Visual Acuity      ED Medications  Medications   HYDROmorphone (DILAUDID) injection 0.2 mg (0.2 mg Intravenous Given 7/22/23 0574) morphine injection 4 mg (4 mg Intravenous Given 7/22/23 0329)   ondansetron (ZOFRAN) injection 4 mg (4 mg Intravenous Given 7/22/23 0328)   iohexol (OMNIPAQUE) 350 MG/ML injection (SINGLE-DOSE) 100 mL (100 mL Intravenous Given 7/22/23 3163)       Diagnostic Studies  Results Reviewed     Procedure Component Value Units Date/Time    Protime-INR [[id number]]  (Normal) Collected: 07/22/23 0600    Lab Status: Final result Specimen: Blood from Arm, Left Updated: 07/22/23 0633     Protime 13.3 seconds      INR 0.98    APTT [[id number]]  (Normal) Collected: 07/22/23 0600    Lab Status: Final result Specimen: Blood from Arm, Left Updated: 07/22/23 0633     PTT 32 seconds     HS Troponin I 2hr [[id number]]  (Normal) Collected: 07/22/23 0529    Lab Status: Final result Specimen: Blood from Arm, Left Updated: 07/22/23 0558     hs TnI 2hr 20 ng/L      Delta 2hr hsTnI 4 ng/L     HS Troponin I 4hr [[id number]]     Lab Status: No result Specimen: Blood     HS Troponin 0hr (reflex protocol) [[id number]]  (Normal) Collected: 07/22/23 0324    Lab Status: Final result Specimen: Blood from Arm, Left Updated: 07/22/23 0359     hs TnI 0hr 16 ng/L     B-Type Natriuretic Peptide(BNP) [[id number]]  (Normal) Collected: 07/22/23 0324    Lab Status: Final result Specimen: Blood from Arm, Left Updated: 07/22/23 0359     BNP 75 pg/mL     Comprehensive metabolic panel [[id number]]  (Abnormal) Collected: 07/22/23 0324    Lab Status: Final result Specimen: Blood from Arm, Left Updated: 07/22/23 0353     Sodium 142 mmol/L      Potassium 4.3 mmol/L      Chloride 95 mmol/L      CO2 43 mmol/L      ANION GAP 4 mmol/L      BUN 16 mg/dL      Creatinine 0.96 mg/dL      Glucose 109 mg/dL      Calcium 10.1 mg/dL      AST 20 U/L      ALT 11 U/L      Alkaline Phosphatase 69 U/L      Total Protein 7.7 g/dL      Albumin 4.2 g/dL      Total Bilirubin 0.81 mg/dL      eGFR 71 ml/min/1.73sq m     Narrative:      [place] guidelines for Chronic Kidney Disease (CKD):   •  Stage 1 with normal or high GFR (GFR > 90 mL/min/1.73 square meters)  •  Stage 2 Mild CKD (GFR = 60-89 mL/min/1.73 square meters)  •  Stage 3A Moderate CKD (GFR = 45-59 mL/min/1.73 square meters)  •  Stage 3B Moderate CKD (GFR = 30-44 mL/min/1.73 square meters)  •  Stage 4 Severe CKD (GFR = 15-29 mL/min/1.73 square meters)  •  Stage 5 End Stage CKD (GFR <15 mL/min/1.73 square meters)  Note: GFR calculation is accurate only with a steady state creatinine    Lipase [[id number]]  (Normal) Collected: 07/22/23 0324    Lab Status: Final result Specimen: Blood from Arm, Left Updated: 07/22/23 0353     Lipase 20 u/L     UA (URINE) with reflex to Scope [[id number]]  (Abnormal) Collected: 07/22/23 0336    Lab Status: Final result Specimen: Urine, Clean Catch Updated: 07/22/23 0347     Color, UA Straw     Clarity, UA Cloudy     Specific Gravity, UA 1.020     pH, UA 8.0     Leukocytes, UA Negative     Nitrite, UA Negative     Protein, UA Negative mg/dl      Glucose, UA Negative mg/dl      Ketones, UA Negative mg/dl      Bilirubin, UA Negative     Occult Blood, UA Negative     UROBILINOGEN UA Negative mg/dL     CBC and differential [[id number]]  (Abnormal) Collected: 07/22/23 0324    Lab Status: Final result Specimen: Blood from Arm, Left Updated: 07/22/23 0340     WBC 7.60 Thousand/uL      RBC 5.06 Million/uL      Hemoglobin 15.5 g/dL      Hematocrit 52.2 %       fL      MCH 30.6 pg      MCHC 29.7 g/dL      RDW 13.2 %      MPV 9.3 fL      Platelets 061 Thousands/uL      nRBC 0 /100 WBCs      Neutrophils Relative 72 %      Immat GRANS % 0 %      Lymphocytes Relative 13 %      Monocytes Relative 10 %      Eosinophils Relative 5 %      Basophils Relative 0 %      Neutrophils Absolute 5.43 Thousands/µL      Immature Grans Absolute 0.02 Thousand/uL      Lymphocytes Absolute 0.97 Thousands/µL      Monocytes Absolute 0.77 Thousand/µL      Eosinophils Absolute 0.38 Thousand/µL      Basophils Absolute 0.03 Thousands/µL                  CT abdomen pelvis with contrast   Final Result by Margo Maciel MD (07/22 1791)      Small bowel obstruction secondary to a right inguinal hernia. Small volume ascites. Mesenteric edema is noted. . Edema is noted in the hernia sac raising the possibility of strangulation. Ascending aortic aneurysm post stent graft measures 4.1 x 4.2, smaller than on the prior exam.. Nonobstructing stones in the kidneys bilaterally measuring up to 5 mm on the right and 8 x 4 mm in the left. 4 mm stone in the distal right ureter. 5 mm stone resulting in hydroureteronephrosis in the right UVJ. Right middle lobe scarring and atelectasis concerning for an atypical infection possibly SHWETHA or MAC. I personally discussed this study with Cortney Schaffer on 7/22/2023 5:15 AM.            Workstation performed: [id number]         XR chest 1 view portable    (Results Pending)              Procedures  Procedures         ED Course  ED Course as of 07/22/23 0733   Sat Jul 22, 2023   0516 Spoke to Turner: Right inguinal hernia, strangulation possibility. Obstructive stone on R.   0517 Leukocytes, UA: Negative   0517 Nitrite, UA: Negative   0517 hs TnI 0hr: 16  Baseline 29   0517 WBC: 7.60   0533 Spoke to Annemarie Garland, transfer to [facility] for OR.   0536 WBC: 7.60  No fever cough or leukocytosis, less likely pneumonia as read on CT   0634 EMTALA signed, awaiting transport to [facility]   0640 7:30  time                                             MDM     Amount and/or Complexity of Data Reviewed  Clinical lab tests: ordered and reviewed yes  Reviewed past medical records: yes     History Provided by patient son and wife     Differential considered  Lower abd pain: colitis obstruction infection incarcerated/strangulated hernia     Labs looking for any acute changes to wbc count or any acute electrolyte abnormalities. LFTs for acute liver pathology, lipase for pancreatitis.  Imaging for any acute pathology. Symptomatic treatments provided. Imaging shows no acute findings. Incarcerated/strangulated hernia. Also obstructing R ureteral stone. Patient case discussed with Surgery, see ed course. Transferred to [facility] in [place] for OR. Disposition  Final diagnoses:   SBO (small bowel obstruction) ([address])   Incarcerated hernia   Kidney stone   Right ureteral stone     Time reflects when diagnosis was documented in both MDM as applicable and the Disposition within this note     Time User Action Codes Description Comment    7/22/2023  5:34 AM Shayy Rater Add [K56.609] SBO (small bowel obstruction) ([address])     7/22/2023  5:34 AM Shayy Rater Add [K46.0] Incarcerated hernia     7/22/2023  5:35 AM Shayy Rater Add [N20.0] Kidney stone     7/22/2023  5:35 AM Shayy Rater Add [K80.61] Multiple obstructing stones in biliary tract     7/22/2023  5:35 AM Shayy Rater Remove [K80.61] Multiple obstructing stones in biliary tract     7/22/2023  5:35 AM Shayy Rater Add [N20.1] Right ureteral stone       ED Disposition     ED Disposition   Transfer to Another Facility-In Network    Condition   --    Date/Time   Sat Jul 22, 2023  5:34 AM    Comment   Kaitlynn Santoyo should be transferred out to [facility].            MD Documentation    Yuly Cade Most Recent Value   Patient Condition The patient has been stabilized such that within reasonable medical probability, no material deterioration of the patient condition or the condition of the unborn child(joselito) is likely to result from the transfer   Reason for Transfer Level of Care needed not available at this facility   Benefits of Transfer Specialized equipment and/or services available at the receiving facility (Include comment)________________________  [surgery]   Risks of Transfer Potential deterioration of medical condition, Potential for delay in receiving treatment, Loss of IV, Increased discomfort during transfer, Possible worsening of condition or death during transfer Accepting Physician Dr. Dulce Cruz Name, Roger Cardona   Provider Certification General risk, such as traffic hazards, adverse weather conditions, rough terrain or turbulence, possible failure of equipment (including vehicle or aircraft), or consequences of actions of persons outside the control of the transport personnel, Unanticipated needs of medical equipment and personnel during transport, Risk of worsening condition, The possibility of a transport vehicle being unavailable      RN Documentation    [address] [place], Roger Estrada   Bed Assignment 216      Follow-up Information    None         Patient's Medications   Discharge Prescriptions    No medications on file       No discharge procedures on file.     PDMP Review     None          ED Provider  Electronically Signed by           Ken Rivera MD  07/22/23 0309

## 2023-07-22 NOTE — ASSESSMENT & PLAN NOTE
· Status post surgical reduction and repair  · Admit to the intensive care unit  · Frequent neurovascular checks  · Continuous cardiopulmonary monitoring

## 2023-07-22 NOTE — H&P
H&P Exam - General Surgery   Maia Flaherty 82 y.o. male MRN: [id number]  Unit/Bed#: [place] 2 -01 Encounter: [id number]    Assessment/Plan     Assessment/Plan:  Incarcerated possibly strangulated recurrent right inguinal hernia  Small bowel obstruction due to incarcerated hernia  Severe COPD on home oxygen  History of CHF    History of Present Illness     HPI:  Maia Flaherty is a 82 y.o. male who presents to the emergency department at [facility] with abdominal pain. Patient is [place]-speaking only and the official  line was used. The time of exam patient is poorly responsive at the time of exam but the wife states this is more due to tiredness. He has history of a right inguinal repair in the distant past.  In 2019 he had a recurrent right inguinal hernia repair with preperitoneal mesh and onlay mesh. He was seen and admitted in January at [facility] for incarcerated hernia but that spontaneously reduced. Surgery was deferred at that time due to his medical comorbidities.   He is on home oxygen    Review of Systems   Unable to perform ROS: Other       Historical Information   Past Medical History:   Diagnosis Date   • Acute metabolic encephalopathy 67/57/3673   • Anemia    • Aneurysm, aorta, thoracic (HCC)    • Appendicolith    • Ascending aortic aneurysm ([facility])     3.7   • Asthma    • BPH (benign prostatic hyperplasia)    • CAD (coronary artery disease)     noted on CT scan   • CHF (congestive heart failure) ([facility])    • COPD (chronic obstructive pulmonary disease) (HCC)    • Descending thoracic aortic aneurysm (HCC)    • Diabetes mellitus ([address])    • Diverticulosis    • Former tobacco use    • GERD (gastroesophageal reflux disease)    • History of DVT (deep vein thrombosis)     Left leg   • History of transfusion    • Hypertension    • Inguinal hernia     right   • Nephrolithiasis    • Oxygen dependent     2LNC   • Oxygen dependent    • Pneumonia    • Pre-diabetes • Prostate calculus    • PVD (peripheral vascular disease) ([address])    • Thoracic aortic aneurysm without rupture ([address]) 2018    Added automatically from request for surgery [id number]   • Thrombocytopenia ([address]) 2018   • Ulcer    • 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[address])    • Varicose vein of leg     b/l     Past Surgical History:   Procedure Laterality Date   • CARDIAC SURGERY     • ESOPHAGOGASTRODUODENOSCOPY     • HERNIA REPAIR Right 2019    Procedure: REPAIR HERNIA INGUINAL WITH MESH;  Surgeon: Mari George MD;  Location: [address] [place] [address] MAIN OR;  Service: General   • INGUINAL HERNIA REPAIR Bilateral    • IR TEVAR  2018   • RI EVASC RPR DTA COVERAGE ART ORIGIN 1ST ENDOPROSTH N/A 2018    Procedure: TEVAR - endovascular thoracic aortic aneurysm repair;  Surgeon: Aliza Jung MD;  Location:  MAIN OR;  Service: Vascular   • THORACIC AORTIC ANEURYSM REPAIR  2018   • VARICOSE VEIN SURGERY Bilateral     vein stripping     Social History   Social History     Substance and Sexual Activity   Alcohol Use Never     Social History     Substance and Sexual Activity   Drug Use No     Social History     Tobacco Use   Smoking Status Former   • Packs/day: 1.00   • Years: 35.00   • Total pack years: 35.00   • Types: Cigarettes   • Start date:    • Quit date:    • Years since quittin.5   Smokeless Tobacco Never     E-Cigarette/Vaping   • E-Cigarette Use Never User      E-Cigarette/Vaping Substances     Family History: non-contributory    Meds/Allergies   all medications and allergies reviewed  Allergies   Allergen Reactions   • Penicillins Hives, Itching and Rash   • Lisinopril Rash     Side pains and rash    • Zyprexa [Olanzapine] Tongue Swelling       Objective   First Vitals:   Blood Pressure: 143/91 (23)  Pulse: 94 (23)  Temperature: 98.4 °F (36.9 °C) (23)  Temp Source: Oral (23)  Respirations: 16 (23)  SpO2: 91 % (23 3009)    Current Vitals:   Blood Pressure: 143/91 (07/22/23 0818)  Pulse: 94 (07/22/23 0818)  Temperature: 98.4 °F (36.9 °C) (07/22/23 0818)  Temp Source: Oral (07/22/23 0818)  Respirations: 16 (07/22/23 0818)  SpO2: 91 % (07/22/23 0818)    No intake or output data in the 24 hours ending 07/22/23 0902    Invasive Devices     Peripheral Intravenous Line  Duration           Peripheral IV 07/22/23 Dorsal (posterior); Right Forearm <1 day                Physical Exam  Vitals and nursing note reviewed. Constitutional:       Appearance: He is ill-appearing. HENT:      Head: Normocephalic and atraumatic. Nose: Nose normal.   Cardiovascular:      Rate and Rhythm: Regular rhythm. Pulmonary:      Comments: Currently on 5 L nasal cannula at 93%. Abdominal:      General: There is no distension. Palpations: Abdomen is soft. Comments: In the right groin there is a nonreducible tender right inguinal hernia. No skin changes. Musculoskeletal:      Cervical back: Normal range of motion. Skin:     General: Skin is warm and dry. Neurological:      Comments: Poorly responsive due to fatigue per the patient's wife         Lab Results: I have personally reviewed pertinent lab results. Imaging: I have personally reviewed pertinent films in PACS  EKG, Pathology, and Other Studies:   *.

## 2023-07-22 NOTE — ANESTHESIA PREPROCEDURE EVALUATION
Procedure:  REPAIR HERNIA INGUINAL open, possible bowel resection (Right: Groin)    Relevant Problems   CARDIO   (+) Benign essential hypertension   (+) Descending aortic aneurysm (HCC)   (+) Hyperlipidemia      GI/HEPATIC       (+) Small bowel obstruction ([address] Pt reports no nausea currently. Stomach does not appear distended. /RENAL   (+) Acquired renal cyst of left kidney   (+) Chronic kidney disease, stage 3a (HCC)      NEURO/PSYCH   (+) TIA (transient ischemic attack)      PULMONARY   (+) Chronic obstructive pulmonary disease       (+) Supplemental oxygen dependent. On 2-3 L O2/min at home. 4/11/23 ECHO   Left Ventricle Left ventricular cavity size is normal. Wall thickness is moderately increased. Systolic function is normal.  Wall motion is normal. Diastolic function is mildly abnormal, consistent with grade I (abnormal) relaxation. Right Ventricle Right ventricular cavity size is normal. Systolic function is normal. Wall thickness is normal.      Left Atrium The atrium is normal in size. Right Atrium The atrium is normal in size. Aortic Valve The aortic valve is trileaflet. The leaflets are not thickened. The leaflets are mildly calcified. The leaflets exhibit normal mobility. There is no evidence of regurgitation. The aortic valve has no significant stenosis. Mitral Valve Mitral valve structure is normal. There is trace regurgitation. There is no evidence of stenosis. Tricuspid Valve Tricuspid valve structure is normal. There is mild regurgitation. There is no evidence of stenosis. The right ventricular systolic pressure is mildly elevated. The estimated right ventricular systolic pressure is 19.20 mmHg. Pulmonic Valve Pulmonic valve structure is normal. There is trace regurgitation. There is no evidence of stenosis. Ascending Aorta The aortic root is normal in size. IVC/SVC The right atrial pressure is estimated at 3.0 mmHg.  The inferior vena cava is normal in size. Respirophasic changes were normal.      Pericardium There is no pericardial effusion. The pericardium is normal in appearance. Physical Exam    Airway    Mallampati score: III  TM Distance: >3 FB  Neck ROM: full     Dental       Cardiovascular  Rhythm: regular, Rate: normal,     Pulmonary  Comment: Diminished breath sounds. On O2,     Other Findings        Anesthesia Plan  ASA Score- 4 Emergent    Anesthesia Type- general with ASA Monitors. Additional Monitors:   Airway Plan: ETT. Plan Factors-    Chart reviewed. Existing labs reviewed. Induction- rapid sequence induction. Postoperative Plan- Plan for postoperative opioid use. Informed Consent- Anesthetic plan and risks discussed with patient.

## 2023-07-22 NOTE — ANESTHESIA POSTPROCEDURE EVALUATION
Post-Op Assessment Note    CV Status:  Stable       Post-procedure mental status: sedated on vent. Hydration Status:  Euvolemic   Airway Patency:  Patent  Airway: intubated      Post Op Vitals Reviewed: Yes      Staff: Anesthesiologist         No notable events documented.     BP      Temp      Pulse     Resp      SpO2      /78   Pulse 78   Temp 98 °F (36.7 °C) (Axillary)   Resp 16   Ht 5' 5" (1.651 m)   Wt 61.2 kg (134 lb 14.7 oz)   SpO2 96%   BMI 22.45 kg/m²

## 2023-07-22 NOTE — ASSESSMENT & PLAN NOTE
· Patient remains intubated postoperatively  · Updraft nebulizer treatments as needed  · Plan to wean and extubate vent  · Supplemental oxygen postextubation as needed  · Continuous cardiopulmonary monitoring  · Patient requires 2 L of oxygen via nasal cannula at baseline at home

## 2023-07-22 NOTE — EMTALA/ACUTE CARE TRANSFER
[facility] EMERGENCY DEPARTMENT  [address] [facility] [id number] [phone]  Dept: [address] TRANSFER CONSENT    NAME Peri ONTIVEROS 1935                              MRN [id number]    I have been informed of my rights regarding examination, treatment, and transfer   by Dr. Ladarius Crowder MD    Benefits: Specialized equipment and/or services available at the receiving facility (Include comment)________________________ (surgery)    Risks: Potential deterioration of medical condition, Potential for delay in receiving treatment, Loss of IV, Increased discomfort during transfer, Possible worsening of condition or death during transfer      Consent for Transfer:  I acknowledge that my medical condition has been evaluated and explained to me by the emergency department physician or other qualified medical person and/or my attending physician, who has recommended that I be transferred to the service of  Accepting Physician: Gabriel Garcia MD at [address] [place] [address] [address] Name, [address] : [address] [place] [address] The above potential benefits of such transfer, the potential risks associated with such transfer, and the probable risks of not being transferred have been explained to me, and I fully understand them. The doctor has explained that, in my case, the benefits of transfer outweigh the risks. I agree to be transferred. I authorize the performance of emergency medical procedures and treatments upon me in both transit and upon arrival at the receiving facility. Additionally, I authorize the release of any and all medical records to the receiving facility and request they be transported with me, if possible. I understand that the safest mode of transportation during a medical emergency is an ambulance and that the Hospital advocates the use of this mode of transport.  Risks of traveling to the receiving facility by car, including absence of medical control, life sustaining equipment, such as oxygen, and medical personnel has been explained to me and I fully understand them. (TANG CORRECT BOX BELOW)  [  ]  I consent to the stated transfer and to be transported by ambulance/helicopter. [  ]  I consent to the stated transfer, but refuse transportation by ambulance and accept full responsibility for my transportation by car. I understand the risks of non-ambulance transfers and I exonerate the Hospital and its staff from any deterioration in my condition that results from this refusal.    X___________________________________________    DATE  23  TIME________  Signature of patient or legally responsible individual signing on patient behalf           RELATIONSHIP TO PATIENT_________________________          Provider Certification    NAME Julia Locke                                         1935                              MRN [id number]    A medical screening exam was performed on the above named patient. Based on the examination:    Condition Necessitating Transfer The primary encounter diagnosis was SBO (small bowel obstruction) ([address]). Diagnoses of Incarcerated hernia, Kidney stone, and Right ureteral stone were also pertinent to this visit.     Patient Condition: The patient has been stabilized such that within reasonable medical probability, no material deterioration of the patient condition or the condition of the unborn child(joselito) is likely to result from the transfer    Reason for Transfer: Level of Care needed not available at this facility    Transfer Requirements: [address]   · Space available and qualified personnel available for treatment as acknowledged by    · Agreed to accept transfer and to provide appropriate medical treatment as acknowledged by       Liza Agrawal MD  · Appropriate medical records of the examination and treatment of the patient are provided at the time of transfer   STAFF INITIAL WHEN COMPLETED _______  · Transfer will be performed by qualified personnel from    and appropriate transfer equipment as required, including the use of necessary and appropriate life support measures. Provider Certification: I have examined the patient and explained the following risks and benefits of being transferred/refusing transfer to the patient/family:  General risk, such as traffic hazards, adverse weather conditions, rough terrain or turbulence, possible failure of equipment (including vehicle or aircraft), or consequences of actions of persons outside the control of the transport personnel, Unanticipated needs of medical equipment and personnel during transport, Risk of worsening condition, The possibility of a transport vehicle being unavailable      Based on these reasonable risks and benefits to the patient and/or the unborn child(joselito), and based upon the information available at the time of the patient’s examination, I certify that the medical benefits reasonably to be expected from the provision of appropriate medical treatments at another medical facility outweigh the increasing risks, if any, to the individual’s medical condition, and in the case of labor to the unborn child, from effecting the transfer.     X____________________________________________ DATE 07/22/23        TIME_______      ORIGINAL - SEND TO MEDICAL RECORDS   COPY - SEND WITH PATIENT DURING TRANSFER

## 2023-07-22 NOTE — ASSESSMENT & PLAN NOTE
· Small bowel obstruction secondary to recurring incarcerated right inguinal hernia. 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 · Patient transferred to the intensive care unit intubated secondary to history of severe COPD and congestive heart failure.

## 2023-07-22 NOTE — ASSESSMENT & PLAN NOTE
Wt Readings from Last 3 Encounters:   07/22/23 61.2 kg (134 lb 14.7 oz)   07/22/23 58.7 kg (129 lb 6.6 oz)   04/26/23 62.6 kg (138 lb)     · Chest x-ray done today 22 July 2023 does not appear to be volume overloaded  · Exam is absent for rales  · Diuresis as needed  · Monitor electrolytes

## 2023-07-22 NOTE — PROGRESS NOTES
[address] [facility]  ICU accept/Progress Note  Name: Jass Garzon  MRN: [id number]  Unit/Bed#: ICU 05 I Date of Admission: 7/22/2023   Date of Service: 7/22/2023 I Hospital Day: 0    Assessment/Plan   Accelerated essential hypertension  Assessment & Plan  · Continuous cardiopulmonary monitoring  · Treat hypertension as needed    COPD without exacerbation ([address])  Assessment & Plan  · Patient remains intubated postoperatively  · Updraft nebulizer treatments as needed  · Plan to wean and extubate vent  · Supplemental oxygen postextubation as needed  · Continuous cardiopulmonary monitoring  · Patient requires 2 L of oxygen via nasal cannula at baseline at home    Chronic kidney disease, stage 3a [facility])  Assessment & Plan  Lab Results   Component Value Date    EGFR 71 07/22/2023    EGFR 66 04/10/2023    EGFR 81 04/09/2023    CREATININE 0.96 07/22/2023    CREATININE 1.02 04/10/2023    CREATININE 0.80 04/09/2023   · Monitor kidney indices  · Avoid nephrotoxic medications  · Stable at this time    Recurrent right inguinal hernia w incarcertion  Assessment & Plan  · Status post surgical reduction and repair  · Admit to the intensive care unit  · Frequent neurovascular checks  · Continuous cardiopulmonary monitoring    Acute on chronic respiratory failure ([address])  Assessment & Plan  · Admit to the intensive care unit  · Continuous cardiopulmonary monitoring  · Patient remains intubated on mechanical ventilation  · Xopenex and Atrovent as needed  · Secondary to COPD and congestive heart failure    Chronic diastolic CHF (congestive heart failure) ([address])  Assessment & Plan  Wt Readings from Last 3 Encounters:   07/22/23 61.2 kg (134 lb 14.7 oz)   07/22/23 58.7 kg (129 lb 6.6 oz)   04/26/23 62.6 kg (138 lb)     · Chest x-ray done today 22 July 2023 does not appear to be volume overloaded  · Exam is absent for rales  · Diuresis as needed  · Monitor electrolytes          Abdominal pain  Assessment & Plan  · Small bowel obstruction secondary to recurring incarcerated right inguinal hernia. 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 · Patient transferred to the intensive care unit intubated secondary to history of severe COPD and congestive heart failure. HPI/24hr events:   Patient arrived from the operating room intubated and sedated. Medications:  Current Facility-Administered Medications   Medication Dose Route Frequency Provider Last Rate   • lactated ringers  50 mL/hr Intravenous Continuous Jose Gee MD 50 mL/hr (07/22/23 1352)     Facility-Administered Medications Ordered in Other Encounters   Medication Dose Route Frequency Provider Last Rate   • albuterol   Intratracheal PRN Melissa Toure CRNA     • ceFAZolin   Intravenous PRN Melissa Toure CRNA     • dexamethasone (PF)   Intravenous PRN Melissa Toure CRNA     • ePHEDrine   Intravenous PRN Melissa Toure CRNA     • fentanyl citrate (PF)   Intravenous PRN Melissa Toure CRNA     • lidocaine (PF)   Intravenous PRN Melissa Toure CRNA     • norepinephrine   Intravenous PRN Melissa Toure CRNA     • phenylephrine (TIFFANY-SYNEPHRINE) 50 mg (STANDARD CONCENTRATION) in sodium chloride 0.9% 250 mL   Intravenous Continuous PRN Melissa Toure CRNA Stopped (07/22/23 1046)   • phenylephrine   Intravenous PRN Melissa Toure CRNA     • propofol   Intravenous PRN Melissa Toure CRNA     • ROCuronium   Intravenous PRN Melissa Toure CRNA     • Succinylcholine Chloride   Intravenous PRN Melissa Toure CRNA         lactated ringers, 50 mL/hr, Last Rate: 50 mL/hr (07/22/23 1352)          Physical exam:  Vitals: Body mass index is 22.45 kg/m². Blood pressure 159/78, pulse 78, temperature 98.4 °F (36.9 °C), temperature source Oral, resp. rate 16, height 5' 5" (1.651 m), weight 61.2 kg (134 lb 14.7 oz), SpO2 99 %. ,  Temp  Min: 98.3 °F (36.8 °C)  Max: 98.4 °F (36.9 °C)  IBW (Ideal Body Weight): 61.5 kg    SpO2: 99 %  SpO2 Activity: At Rest  O2 Device: Nasal cannula      Intake/Output Summary (Last 24 hours) at 7/22/2023 1405  Last data filed at 7/22/2023 1250  Gross per 24 hour   Intake 1000 ml   Output 150 ml   Net 850 ml       Invasive/non-invasive ventilation settings:   Respiratory    Lab Data (Last 4 hours)    None         O2/Vent Data (Last 4 hours)      07/22 1338           Vent Mode AC/VC       Resp Rate (BPM) (BPM) 16       Vt (mL) (mL) 400       FIO2 (%) (%) 50       PEEP (cmH2O) (cmH2O) 6       MV 6.64                 Invasive Devices     Peripheral Intravenous Line  Duration           Peripheral IV 07/22/23 Dorsal (posterior); Right Forearm <1 day    Peripheral IV 07/22/23 Left Antecubital <1 day          Drain  Duration           Urethral Catheter Non-latex 16 Fr. <1 day          Airway  Duration           ETT  Cuffed;Oral 8 mm <1 day                  Physical Exam:  Gen: Intubated and sedated  HEENT: Atraumatic normocephalic pupils equal round reactive to light extraocular movements intact sclera anicteric oral pharynx is intubated  Neck: Supple no JVD no lymphadenopathy trachea midline  Chest: Clear to auscultation bilaterally respirations per the vent  Cor: Regular rate and rhythm no murmurs rubs or gallops  Abd: There is an aortic bruit noted, the abdomen is soft with hypoactive bowel sounds  Ext: No clubbing cyanosis or edema  Neuro: Intubated and sedated  Skin: Warm dry and intact no rash      Diagnostic Data:  Lab: I have personally reviewed pertinent lab results.    CBC:   Results from last 7 days   Lab Units 07/22/23  0324   WBC Thousand/uL 7.60   HEMOGLOBIN g/dL 15.5   HEMATOCRIT % 52.2*   PLATELETS Thousands/uL 128*       CMP:   Results from last 7 days   Lab Units 07/22/23  0324   SODIUM mmol/L 142   POTASSIUM mmol/L 4.3   CHLORIDE mmol/L 95*   CO2 mmol/L 43*   BUN mg/dL 16   CREATININE mg/dL 0.96   CALCIUM mg/dL 10.1   ALK PHOS U/L 69   ALT U/L 11   AST U/L 20 PT/INR:   Lab Results   Component Value Date    INR 0.98 07/22/2023   ,   Magnesium:     Phosphorous:       Microbiology:    No recent microbiology    Imaging:  Chest x-ray is indicative of COPD but is clear    Cardiac lab/EKG/telemetry/ECHO:   Regular rate and rhythm no murmurs rubs or gallops appreciated    VTE Prophylaxis: SCDs    Code Status: Prior    Danni Civatte, CRNP    Portions of the record may have been created with voice recognition software. Occasional wrong word or "sound a like" substitutions may have occurred due to the inherent limitations of voice recognition software. Read the chart carefully and recognize, using context, where substitutions have occurred.

## 2023-07-22 NOTE — OP NOTE
OPERATIVE REPORT  PATIENT NAME: Ana Laura Chan    :    MRN: [id number]  Pt Location: [facility] OR ROOM 06    SURGERY DATE: 2023    Surgeon(s) and Role:     * Erin Moscoso MD - Primary     * Opal Anaya MD - Assisting    Preop Diagnosis:  Small bowel obstruction ([address]) [N61.346]    Post-Op Diagnosis Codes:     * Small bowel obstruction ([address]) [E11.422]     * Recurrent right inguinal hernia [K40.91]    Procedure(s):  Right - REPAIR rECURRENT INCARERATED HERNIA INGUINAL OPEN. RIGHT ORCHIECTOMY    Specimen(s):  ID Type Source Tests Collected by Time Destination   1 :  Tissue Hernia Sac, Right Inguinal TISSUE EXAM Erin Moscoso MD 2023 1124    2 :  Tissue Testis, Right TISSUE EXAM Erin Moscoso MD 2023 1227        Estimated Blood Loss:   Minimal    Drains:  Urethral Catheter Non-latex 16 Fr. (Active)   Number of days: 0       Anesthesia Type:   General    Operative Indications:  Small bowel obstruction ([address]) [K56.609]      Operative Findings:  Extensive scarring and fibrosis. Incarcerated recurrent inguinal hernia with bowel causing small bowel obstruction. Bowel was viable and reduced. Multiple other piece of mesh in the right groin. Due to the dense adhesions the spermatic cord was densely adherent medially to the pubic tubercle and the old mesh and unable to be  and therefore decision was made to perform a right orchiectomy. This also allowed better closure of the floor of the canal and a better repair. Complications:   None    Procedure and Technique:  The patient was seen again in the Holding Room. The risks, benefits, complications, treatment options, and expected outcomes were discussed with the patient. The patient and/or family concurred with the proposed plan, giving informed consent. The site of surgery properly noted/marked. The patient was taken to Operating Room, identified as Ana Laura Chan  and the procedure verified.  A Time Out was held after prepping and draping in sterile fashion. The above information was confirmed. Prior to the induction of general anesthesia, antibiotic prophylaxis was administered. General endotracheal anesthesia was then administered and tolerated well. After the induction, the abdomen was prepped in the usual sterile fashion. Local acetic was used. The old right inguinal incision was opened with a 15 blade scalpel. Tissue sent with Bovie cautery down through the layers. There is extensive scarring in the subcutaneous tissue. Eventually the area was dissected and tracked to the incarcerated hernia. The hernia was freed up from the surrounding tissues deep into the scrotum was adherent down towards the hemiscrotum. After some dissection and relaxation the hernia spontaneously reduced. Part of the sac was identified and freed off of the cord structures however it was a blind-ending sac that was removed. As the cord was dissected towards the internal ring there was more sac identified coming from laterally. The sac was freed up and was opened and through this sac the small bowel was able to be notified there was clear fluid and the small bowel was viable and was then reduced. The hernia sac was then transected and closed. Dissection continued back along the cord towards the floor. However the cord was dense adherent medially to the pubic tubercle and all the old mesh and was unable to be  safely. Therefore the cord was transected and suture-ligated here. The cord was then tracked down to the scrotum and the testicle was removed from the hemiscrotum and sent to pathology. Now the floor of the canal was identified and all the contents have been reduced. The floor was able to be closed with interrupted 2-0 PDS sutures upon the conjoint tendon and an old mesh down towards the shelving edge.   Next the external oblique was redone divided and opened slightly lower laterally and freed up from the underlying mesh to create a pocket for new mesh to be placed. The Prolene mesh was brought on the field and secured at the pubic tubercle and run circumferentially at [facility] type repair all the way out laterally. The external bleak was closed with running 2-0 PDS suture. Subcu was closed with 3-0 Vicryl's. Skin was closed with 4 Monocryl and glue. I was present for the entire procedure.     Patient Disposition:  PACU         SIGNATURE: Lachelle Kearns MD  DATE: July 22, 2023  TIME: 1:00 PM

## 2023-07-22 NOTE — ASSESSMENT & PLAN NOTE
>>ASSESSMENT AND PLAN FOR COPD WITHOUT EXACERBATION ([address]) WRITTEN ON 7/22/2023  1:54 PM BY RAQUEL Menon    Patient remains intubated postoperatively  Updraft nebulizer treatments as needed  Plan to wean and extubate vent  Supplemental oxygen postextubation as needed  Continuous cardiopulmonary monitoring  Patient requires 2 L of oxygen via nasal cannula at baseline at home

## 2023-07-22 NOTE — ASSESSMENT & PLAN NOTE
Lab Results   Component Value Date    EGFR 71 07/22/2023    EGFR 66 04/10/2023    EGFR 81 04/09/2023    CREATININE 0.96 07/22/2023    CREATININE 1.02 04/10/2023    CREATININE 0.80 04/09/2023   · Monitor kidney indices  · Avoid nephrotoxic medications  · Stable at this time

## 2023-07-22 NOTE — PROGRESS NOTES
Progress Note- General Surgery  Chaim Rowley 82 y.o. male MRN: [id number]  Unit/Bed#: ICU 05 Encounter: [id number]    Assessment:  82 M with PMH of COPD, CHF, CKD with recurrent RIH concern for incarceration/strangulation. Now s/p RIHR, R orchiectomy 7/22    Plan:  • Wean Vent  • NPO/OGT   • IVF  • Monitor abdominal exam  • Please TigerText on call [facility] surgery resident or AP with any questions     Subjective/Objective     Subjective:   No acute events overnight. Weaning prop in hopes of exubation this AM.     Pertinent review of systems as above. All other review of systems negative. Objective:    Blood pressure 119/64, pulse 76, temperature 99.1 °F (37.3 °C), temperature source Axillary, resp. rate 13, height 5' 5" (1.651 m), weight 62.3 kg (137 lb 5.6 oz), SpO2 97 %. ,Body mass index is 22.86 kg/m². Intake/Output Summary (Last 24 hours) at 7/23/2023 0836  Last data filed at 7/23/2023 0601  Gross per 24 hour   Intake 1875.56 ml   Output 460 ml   Net 1415.56 ml       Invasive Devices     Peripheral Intravenous Line  Duration           Peripheral IV 07/22/23 Dorsal (posterior); Right Forearm 1 day    Peripheral IV 07/22/23 Left Antecubital <1 day          Drain  Duration           NG/OG/Enteral Tube Orogastric Center mouth <1 day    Urethral Catheter Non-latex 16 Fr. <1 day          Airway  Duration           ETT  Cuffed;Oral 8 mm <1 day                Physical Exam:   Gen:  NAD. HEENT: NCAT. MMM  CV: well perfused  Lungs: Normal respiratory effort  Abd: soft, nt/nd,incisions cdi  Skin: warm/ dry  Extremities: no peripheral edema, no clubbing or cyanosis  Neuro: intubated/sedated    I have personally reviewed pertinent films in PACS.     VTE Pharmacologic Prophylaxis: Enoxaparin (Lovenox)  VTE Mechanical Prophylaxis: sequential compression device

## 2023-07-22 NOTE — ASSESSMENT & PLAN NOTE
· Admit to the intensive care unit  · Continuous cardiopulmonary monitoring  · Patient remains intubated on mechanical ventilation  · Xopenex and Atrovent as needed  · Secondary to COPD and congestive heart failure

## 2023-07-22 NOTE — ASSESSMENT & PLAN NOTE
>>ASSESSMENT AND PLAN FOR ACCELERATED ESSENTIAL HYPERTENSION WRITTEN ON 7/22/2023  1:55 PM BY RAQUEL CHRISTINE    Continuous cardiopulmonary monitoring  Treat hypertension as needed

## 2023-07-23 LAB
ANION GAP SERPL CALCULATED.3IONS-SCNC: 6 MMOL/L
BASOPHILS # BLD AUTO: 0.02 THOUSANDS/ÂΜL (ref 0–0.1)
BASOPHILS NFR BLD AUTO: 0 % (ref 0–1)
BUN SERPL-MCNC: 23 MG/DL (ref 5–25)
CALCIUM SERPL-MCNC: 8.5 MG/DL (ref 8.4–10.2)
CHLORIDE SERPL-SCNC: 99 MMOL/L (ref 96–108)
CO2 SERPL-SCNC: 34 MMOL/L (ref 21–32)
CREAT SERPL-MCNC: 1.01 MG/DL (ref 0.6–1.3)
EOSINOPHIL # BLD AUTO: 0.01 THOUSAND/ÂΜL (ref 0–0.61)
EOSINOPHIL NFR BLD AUTO: 0 % (ref 0–6)
ERYTHROCYTE [DISTWIDTH] IN BLOOD BY AUTOMATED COUNT: 13.7 % (ref 11.6–15.1)
GFR SERPL CREATININE-BSD FRML MDRD: 67 ML/MIN/1.73SQ M
GLUCOSE SERPL-MCNC: 113 MG/DL (ref 65–140)
HCT VFR BLD AUTO: 41.1 % (ref 36.5–49.3)
HGB BLD-MCNC: 10.7 G/DL (ref 12–17)
HGB BLD-MCNC: 11.8 G/DL (ref 12–17)
IMM GRANULOCYTES # BLD AUTO: 0.02 THOUSAND/UL (ref 0–0.2)
IMM GRANULOCYTES NFR BLD AUTO: 0 % (ref 0–2)
LYMPHOCYTES # BLD AUTO: 1.05 THOUSANDS/ÂΜL (ref 0.6–4.47)
LYMPHOCYTES NFR BLD AUTO: 14 % (ref 14–44)
MAGNESIUM SERPL-MCNC: 2 MG/DL (ref 1.9–2.7)
MCH RBC QN AUTO: 29.8 PG (ref 26.8–34.3)
MCHC RBC AUTO-ENTMCNC: 28.7 G/DL (ref 31.4–37.4)
MCV RBC AUTO: 104 FL (ref 82–98)
MONOCYTES # BLD AUTO: 1.56 THOUSAND/ÂΜL (ref 0.17–1.22)
MONOCYTES NFR BLD AUTO: 20 % (ref 4–12)
NEUTROPHILS # BLD AUTO: 5.02 THOUSANDS/ÂΜL (ref 1.85–7.62)
NEUTS SEG NFR BLD AUTO: 66 % (ref 43–75)
NRBC BLD AUTO-RTO: 0 /100 WBCS
PLATELET # BLD AUTO: 102 THOUSANDS/UL (ref 149–390)
PMV BLD AUTO: 9.5 FL (ref 8.9–12.7)
POTASSIUM SERPL-SCNC: 4 MMOL/L (ref 3.5–5.3)
RBC # BLD AUTO: 3.96 MILLION/UL (ref 3.88–5.62)
SODIUM SERPL-SCNC: 139 MMOL/L (ref 135–147)
WBC # BLD AUTO: 7.68 THOUSAND/UL (ref 4.31–10.16)

## 2023-07-23 PROCEDURE — NC001 PR NO CHARGE: Performed by: SURGERY

## 2023-07-23 PROCEDURE — 99291 CRITICAL CARE FIRST HOUR: CPT | Performed by: INTERNAL MEDICINE

## 2023-07-23 PROCEDURE — 94760 N-INVAS EAR/PLS OXIMETRY 1: CPT

## 2023-07-23 PROCEDURE — 94640 AIRWAY INHALATION TREATMENT: CPT

## 2023-07-23 PROCEDURE — 85018 HEMOGLOBIN: CPT

## 2023-07-23 PROCEDURE — 80048 BASIC METABOLIC PNL TOTAL CA: CPT | Performed by: INTERNAL MEDICINE

## 2023-07-23 PROCEDURE — C9113 INJ PANTOPRAZOLE SODIUM, VIA: HCPCS | Performed by: INTERNAL MEDICINE

## 2023-07-23 PROCEDURE — 83735 ASSAY OF MAGNESIUM: CPT | Performed by: INTERNAL MEDICINE

## 2023-07-23 PROCEDURE — 85025 COMPLETE CBC W/AUTO DIFF WBC: CPT | Performed by: INTERNAL MEDICINE

## 2023-07-23 PROCEDURE — 94003 VENT MGMT INPAT SUBQ DAY: CPT

## 2023-07-23 RX ORDER — LEVALBUTEROL INHALATION SOLUTION 0.63 MG/3ML
0.63 SOLUTION RESPIRATORY (INHALATION) EVERY 6 HOURS PRN
Status: DISCONTINUED | OUTPATIENT
Start: 2023-07-23 | End: 2023-07-26 | Stop reason: HOSPADM

## 2023-07-23 RX ORDER — LEVALBUTEROL INHALATION SOLUTION 0.63 MG/3ML
0.63 SOLUTION RESPIRATORY (INHALATION)
Status: DISCONTINUED | OUTPATIENT
Start: 2023-07-23 | End: 2023-07-23

## 2023-07-23 RX ORDER — PREDNISONE 5 MG/1
5 TABLET ORAL DAILY
Qty: 30 TABLET | Refills: 4 | Status: SHIPPED | OUTPATIENT
Start: 2023-07-23 | End: 2023-07-26

## 2023-07-23 RX ADMIN — PANTOPRAZOLE SODIUM 40 MG: 40 INJECTION, POWDER, FOR SOLUTION INTRAVENOUS at 09:40

## 2023-07-23 RX ADMIN — CHLORHEXIDINE GLUCONATE 15 ML: 1.2 RINSE ORAL at 09:40

## 2023-07-23 RX ADMIN — IPRATROPIUM BROMIDE 0.5 MG: 0.5 SOLUTION RESPIRATORY (INHALATION) at 08:38

## 2023-07-23 RX ADMIN — LEVALBUTEROL HYDROCHLORIDE 0.63 MG: 0.63 SOLUTION RESPIRATORY (INHALATION) at 08:39

## 2023-07-23 RX ADMIN — CHLORHEXIDINE GLUCONATE 15 ML: 1.2 RINSE ORAL at 22:19

## 2023-07-23 RX ADMIN — UMECLIDINIUM BROMIDE AND VILANTEROL TRIFENATATE 1 PUFF: 62.5; 25 POWDER RESPIRATORY (INHALATION) at 16:02

## 2023-07-23 RX ADMIN — ENOXAPARIN SODIUM 40 MG: 40 INJECTION SUBCUTANEOUS at 09:40

## 2023-07-23 RX ADMIN — SODIUM CHLORIDE, SODIUM LACTATE, POTASSIUM CHLORIDE, AND CALCIUM CHLORIDE 50 ML/HR: .6; .31; .03; .02 INJECTION, SOLUTION INTRAVENOUS at 16:02

## 2023-07-23 NOTE — ASSESSMENT & PLAN NOTE
Wt Readings from Last 3 Encounters:   07/23/23 62.3 kg (137 lb 5.6 oz)   07/22/23 58.7 kg (129 lb 6.6 oz)   04/26/23 62.6 kg (138 lb)     · Chest x-ray done today 22 July 2023 does not appear to be volume overloaded  · Exam is absent for rales  · Diuresis as needed  · Monitor electrolytes

## 2023-07-23 NOTE — PLAN OF CARE
Problem: Prexisting or High Potential for Compromised Skin Integrity  Goal: Skin integrity is maintained or improved  Description: INTERVENTIONS:  - Identify patients at risk for skin breakdown  - Assess and monitor skin integrity  - Assess and monitor nutrition and hydration status  - Monitor labs   - Assess for incontinence   - Turn and reposition patient  - Assist with mobility/ambulation  - Relieve pressure over bony prominences  - Avoid friction and shearing  - Provide appropriate hygiene as needed including keeping skin clean and dry  - Evaluate need for skin moisturizer/barrier cream  - Collaborate with interdisciplinary team   - Patient/family teaching  - Consider wound care consult   Outcome: Progressing     Problem: MOBILITY - ADULT  Goal: Maintain or return to baseline ADL function  Description: INTERVENTIONS:  -  Assess patient's ability to carry out ADLs; assess patient's baseline for ADL function and identify physical deficits which impact ability to perform ADLs (bathing, care of mouth/teeth, toileting, grooming, dressing, etc.)  - Assess/evaluate cause of self-care deficits   - Assess range of motion  - Assess patient's mobility; develop plan if impaired  - Assess patient's need for assistive devices and provide as appropriate  - Encourage maximum independence but intervene and supervise when necessary  - Involve family in performance of ADLs  - Assess for home care needs following discharge   - Consider OT consult to assist with ADL evaluation and planning for discharge  - Provide patient education as appropriate  Outcome: Progressing  Goal: Maintains/Returns to pre admission functional level  Description: INTERVENTIONS:  - Perform BMAT or MOVE assessment daily.   - Set and communicate daily mobility goal to care team and patient/family/caregiver. - Collaborate with rehabilitation services on mobility goals if consulted  - Perform Range of Motion 6 times a day.   - Reposition patient every 2 hours.  - Record patient progress and toleration of activity level   Outcome: Progressing     Problem: SAFETY,RESTRAINT: NV/NON-SELF DESTRUCTIVE BEHAVIOR  Goal: Remains free of harm/injury (restraint for non violent/non self-detsructive behavior)  Description: INTERVENTIONS:  - Instruct patient/family regarding restraint use   - Assess and monitor physiologic and psychological status   - Provide interventions and comfort measures to meet assessed patient needs   - Identify and implement measures to help patient regain control  - Assess readiness for release of restraint   Outcome: Progressing  Goal: Returns to optimal restraint-free functioning  Description: INTERVENTIONS:  - Assess the patient's behavior and symptoms that indicate continued need for restraint  - Identify and implement measures to help patient regain control  - Assess readiness for release of restraint   Outcome: Progressing

## 2023-07-23 NOTE — ASSESSMENT & PLAN NOTE
Lab Results   Component Value Date    EGFR 67 07/23/2023    EGFR 71 07/22/2023    EGFR 66 04/10/2023    CREATININE 1.01 07/23/2023    CREATININE 0.96 07/22/2023    CREATININE 1.02 04/10/2023   · Monitor kidney indices  · Avoid nephrotoxic medications  · Stable at this time

## 2023-07-23 NOTE — ASSESSMENT & PLAN NOTE
>>ASSESSMENT AND PLAN FOR COPD WITHOUT EXACERBATION ([address]) WRITTEN ON 7/23/2023  8:34 AM BY Kathleen Mason MD    Patient remains intubated postoperatively  Updraft nebulizer treatments as needed  Plan to wean and extubate vent  Supplemental oxygen postextubation as needed  Continuous cardiopulmonary monitoring  Patient requires 2 L of oxygen via nasal cannula at baseline at home

## 2023-07-23 NOTE — QUICK NOTE
Post- OP Note - GeneralSurgery   Pinky Escobar 82 y.o. male MRN: [id number]  Unit/Bed#: ICU 05 Encounter: [id number]    Assessment:  82 y.o. male Day of Surgery s/p Procedure(s) (LRB):  REPAIR RECURRENT INCARERATED HERNIA INGUINAL OPEN, RIGHT ORCHIECTOMY (Right)       Subjective/Objective     Subjective: Intubated/sedated    Objective:    Blood pressure 116/62, pulse 72, temperature 98 °F (36.7 °C), temperature source Axillary, resp. rate 12, height 5' 5" (1.651 m), weight 61.2 kg (134 lb 14.7 oz), SpO2 96 %. ,Body mass index is 22.45 kg/m². Physical Exam:   Gen: NAD.   HEENT: MMM  CV: well perfused  Lungs: on vent  Abd: soft, appropriately tender, nondistended, Incisions clean dry and intact  Skin: warm/ dry  Neuro: intubated/sedated    Please tigertext on call surgery resident role on Tigertext with any questions

## 2023-07-23 NOTE — PLAN OF CARE
Problem: Prexisting or High Potential for Compromised Skin Integrity  Goal: Skin integrity is maintained or improved  Description: INTERVENTIONS:  - Identify patients at risk for skin breakdown  - Assess and monitor skin integrity  - Assess and monitor nutrition and hydration status  - Monitor labs   - Assess for incontinence   - Turn and reposition patient  - Assist with mobility/ambulation  - Relieve pressure over bony prominences  - Avoid friction and shearing  - Provide appropriate hygiene as needed including keeping skin clean and dry  - Evaluate need for skin moisturizer/barrier cream  - Collaborate with interdisciplinary team   - Patient/family teaching  - Consider wound care consult   Outcome: Progressing     Problem: MOBILITY - ADULT  Goal: Maintain or return to baseline ADL function  Description: INTERVENTIONS:  -  Assess patient's ability to carry out ADLs; assess patient's baseline for ADL function and identify physical deficits which impact ability to perform ADLs (bathing, care of mouth/teeth, toileting, grooming, dressing, etc.)  - Assess/evaluate cause of self-care deficits   - Assess range of motion  - Assess patient's mobility; develop plan if impaired  - Assess patient's need for assistive devices and provide as appropriate  - Encourage maximum independence but intervene and supervise when necessary  - Involve family in performance of ADLs  - Assess for home care needs following discharge   - Consider OT consult to assist with ADL evaluation and planning for discharge  - Provide patient education as appropriate  Outcome: Progressing  Goal: Maintains/Returns to pre admission functional level  Description: INTERVENTIONS:  - Perform BMAT or MOVE assessment daily.   - Set and communicate daily mobility goal to care team and patient/family/caregiver. - Collaborate with rehabilitation services on mobility goals if consulted  - Perform Range of Motion  times a day.   - Reposition patient every hours.  - Dangle patient  times a day  - Stand patient  times a day  - Ambulate patient  times a day  - Out of bed to chair  times a day   - Out of bed for meals  times a day  - Out of bed for toileting  - Record patient progress and toleration of activity level   Outcome: Progressing     Problem: PAIN - ADULT  Goal: Verbalizes/displays adequate comfort level or baseline comfort level  Description: Interventions:  - Encourage patient to monitor pain and request assistance  - Assess pain using appropriate pain scale  - Administer analgesics based on type and severity of pain and evaluate response  - Implement non-pharmacological measures as appropriate and evaluate response  - Consider cultural and social influences on pain and pain management  - Notify physician/advanced practitioner if interventions unsuccessful or patient reports new pain  Outcome: Progressing     Problem: INFECTION - ADULT  Goal: Absence or prevention of progression during hospitalization  Description: INTERVENTIONS:  - Assess and monitor for signs and symptoms of infection  - Monitor lab/diagnostic results  - Monitor all insertion sites, i.e. indwelling lines, tubes, and drains  - Monitor endotracheal if appropriate and nasal secretions for changes in amount and color  - [place] appropriate cooling/warming therapies per order  - Administer medications as ordered  - Instruct and encourage patient and family to use good hand hygiene technique  - Identify and instruct in appropriate isolation precautions for identified infection/condition  Outcome: Progressing     Problem: SAFETY ADULT  Goal: Maintain or return to baseline ADL function  Description: INTERVENTIONS:  -  Assess patient's ability to carry out ADLs; assess patient's baseline for ADL function and identify physical deficits which impact ability to perform ADLs (bathing, care of mouth/teeth, toileting, grooming, dressing, etc.)  - Assess/evaluate cause of self-care deficits   - Assess range of motion  - Assess patient's mobility; develop plan if impaired  - Assess patient's need for assistive devices and provide as appropriate  - Encourage maximum independence but intervene and supervise when necessary  - Involve family in performance of ADLs  - Assess for home care needs following discharge   - Consider OT consult to assist with ADL evaluation and planning for discharge  - Provide patient education as appropriate  Outcome: Progressing  Goal: Maintains/Returns to pre admission functional level  Description: INTERVENTIONS:  - Perform BMAT or MOVE assessment daily.   - Set and communicate daily mobility goal to care team and patient/family/caregiver. - Collaborate with rehabilitation services on mobility goals if consulted  - Perform Range of Motion  times a day. - Reposition patient every  hours.   - Dangle patient  times a day  - Stand patient  times a day  - Ambulate patient  times a day  - Out of bed to chair  times a day   - Out of bed for meals  times a day  - Out of bed for toileting  - Record patient progress and toleration of activity level   Outcome: Progressing  Goal: Patient will remain free of falls  Description: INTERVENTIONS:  - Educate patient/family on patient safety including physical limitations  - Instruct patient to call for assistance with activity   - Consult OT/PT to assist with strengthening/mobility   - Keep Call bell within reach  - Keep bed low and locked with side rails adjusted as appropriate  - Keep care items and personal belongings within reach  - Initiate and maintain comfort rounds  - Make Fall Risk Sign visible to staff  - Offer Toileting every  Hours, in advance of need  - Initiate/Maintain alarm  - Obtain necessary fall risk management equipment:   - Apply yellow socks and bracelet for high fall risk patients  - Consider moving patient to room near nurses station  Outcome: Progressing     Problem: DISCHARGE PLANNING  Goal: Discharge to home or other facility with appropriate resources  Description: INTERVENTIONS:  - Identify barriers to discharge w/patient and caregiver  - Arrange for needed discharge resources and transportation as appropriate  - Identify discharge learning needs (meds, wound care, etc.)  - Arrange for interpretive services to assist at discharge as needed  - Refer to Case Management Department for coordinating discharge planning if the patient needs post-hospital services based on physician/advanced practitioner order or complex needs related to functional status, cognitive ability, or social support system  Outcome: Progressing     Problem: Knowledge Deficit  Goal: Patient/family/caregiver demonstrates understanding of disease process, treatment plan, medications, and discharge instructions  Description: Complete learning assessment and assess knowledge base.   Interventions:  - Provide teaching at level of understanding  - Provide teaching via preferred learning methods  Outcome: Progressing

## 2023-07-23 NOTE — ASSESSMENT & PLAN NOTE
>>ASSESSMENT AND PLAN FOR ACCELERATED ESSENTIAL HYPERTENSION WRITTEN ON 7/23/2023  8:29 AM BY Zayra Donaldson MD    Continuous cardiopulmonary monitoring  Treat hypertension as needed

## 2023-07-23 NOTE — PROGRESS NOTES
[address] [facility]  Progress Note  Name: Jayy Bryan  MRN: [id number]  Unit/Bed#: ICU 05 I Date of Admission: 7/22/2023   Date of Service: 7/23/2023 I Hospital Day: 1    Assessment/Plan   COPD without exacerbation ([address])  Assessment & Plan  · Patient remains intubated postoperatively  · Updraft nebulizer treatments as needed  · Plan to wean and extubate vent  · Supplemental oxygen postextubation as needed  · Continuous cardiopulmonary monitoring  · Patient requires 2 L of oxygen via nasal cannula at baseline at home    Chronic kidney disease, stage 3a [facility])  Assessment & Plan  Lab Results   Component Value Date    EGFR 67 07/23/2023    EGFR 71 07/22/2023    EGFR 66 04/10/2023    CREATININE 1.01 07/23/2023    CREATININE 0.96 07/22/2023    CREATININE 1.02 04/10/2023   · Monitor kidney indices  · Avoid nephrotoxic medications  · Stable at this time    Recurrent right inguinal hernia w incarcertion  Assessment & Plan  · Status post surgical reduction and repair  · Admit to the intensive care unit  · Frequent neurovascular checks  · Continuous cardiopulmonary monitoring    Acute on chronic respiratory failure ([address])  Assessment & Plan  · Admit to the intensive care unit  · Continuous cardiopulmonary monitoring  · Patient remains intubated on mechanical ventilation- will attempt SBT today and hopefully extubate patient   · Xopenex and Atrovent as needed  · Secondary to COPD and congestive heart failure    Chronic diastolic CHF (congestive heart failure) ([address])  Assessment & Plan  Wt Readings from Last 3 Encounters:   07/23/23 62.3 kg (137 lb 5.6 oz)   07/22/23 58.7 kg (129 lb 6.6 oz)   04/26/23 62.6 kg (138 lb)     · Chest x-ray done today 22 July 2023 does not appear to be volume overloaded  · Exam is absent for rales  · Diuresis as needed  · Monitor electrolytes          Abdominal pain  Assessment & Plan  · Small bowel obstruction secondary to recurring incarcerated right inguinal hernia. 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 · Patient transferred to the intensive care unit intubated secondary to history of severe COPD and congestive heart failure. Accelerated essential hypertension  Assessment & Plan  · Continuous cardiopulmonary monitoring  · Treat hypertension as needed             ----------------------------------------------------------------------------------------  HPI/24hr events: Propofol  Has been weaned. SBT this morning     Patient appropriate for transfer out of the ICU today?: No  Disposition: Admit to Critical Care   Code Status: Level 1 - Full Code  ---------------------------------------------------------------------------------------  SUBJECTIVE      Review of Systems  Review of systems was unable to be performed secondary to Sedated and Intubated   ---------------------------------------------------------------------------------------  OBJECTIVE    Vitals   Vitals:    23 0526 23 0535 23 0600 23 0700   BP:   142/63 119/64   Pulse:   78 76   Resp:   12 13   Temp:       TempSrc:       SpO2: 98%  96% 97%   Weight:  62.3 kg (137 lb 5.6 oz)     Height:         Temp (24hrs), Av.2 °F (36.8 °C), Min:97.5 °F (36.4 °C), Max:99.1 °F (37.3 °C)  Current: Temperature: 99.1 °F (37.3 °C)          Respiratory:  SpO2: SpO2: 97 %  Nasal Cannula O2 Flow Rate (L/min): 2 L/min    Invasive/non-invasive ventilation settings   Respiratory    Lab Data (Last 4 hours)    None         O2/Vent Data (Last 4)       0526  0749  0751  0833     Vent Mode AC/VC AC/VC  CPAP/PS Spont    Resp Rate (BPM) (BPM) 12 12      Vt (mL) (mL) 400 400      FIO2 (%) (%) 50 50 40     PEEP (cmH2O) (cmH2O) 6 6      MV 6.04 7.74      FIO2 (%) (%)    40    PEEP (cmH2O) (cmH2O)    6    Pressure Support (cmH2O) (cmH20)    6                Physical Exam  Vitals and nursing note reviewed. Constitutional:       General: He is not in acute distress. Appearance: He is well-developed. HENT:      Head: Normocephalic and atraumatic. Eyes:      Conjunctiva/sclera: Conjunctivae normal.   Cardiovascular:      Rate and Rhythm: Normal rate and regular rhythm. Heart sounds: No murmur heard. Pulmonary:      Effort: Pulmonary effort is normal. No respiratory distress. Breath sounds: Normal breath sounds. Abdominal:      Palpations: Abdomen is soft. Tenderness: There is no abdominal tenderness. Musculoskeletal:         General: No swelling. Cervical back: Neck supple. Skin:     General: Skin is warm and dry. Capillary Refill: Capillary refill takes less than 2 seconds. Neurological:      Mental Status: He is alert.    Psychiatric:         Mood and Affect: Mood normal.             Laboratory and Diagnostics:  Results from last 7 days   Lab Units 07/23/23  0610 07/22/23  0324   WBC Thousand/uL 7.68 7.60   HEMOGLOBIN g/dL 11.8* 15.5   HEMATOCRIT % 41.1 52.2*   PLATELETS Thousands/uL 102* 128*   NEUTROS PCT % 66 72   MONOS PCT % 20* 10   EOS PCT % 0 5     Results from last 7 days   Lab Units 07/23/23  0529 07/22/23  0324   SODIUM mmol/L 139 142   POTASSIUM mmol/L 4.0 4.3   CHLORIDE mmol/L 99 95*   CO2 mmol/L 34* 43*   ANION GAP mmol/L 6 4   BUN mg/dL 23 16   CREATININE mg/dL 1.01 0.96   CALCIUM mg/dL 8.5 10.1   GLUCOSE RANDOM mg/dL 113 109   ALT U/L  --  11   AST U/L  --  20   ALK PHOS U/L  --  69   ALBUMIN g/dL  --  4.2   TOTAL BILIRUBIN mg/dL  --  0.81     Results from last 7 days   Lab Units 07/23/23  0529   MAGNESIUM mg/dL 2.0      Results from last 7 days   Lab Units 07/22/23  0600   INR  0.98   PTT seconds 32              ABG:  Results from last 7 days   Lab Units 07/22/23  1451   PH ART  7.462*   PCO2 ART mm Hg 51.4*   PO2 ART mm Hg 77.1   HCO3 ART mmol/L 35.9*   BASE EXC ART mmol/L 10.3   ABG SOURCE  Radial, Right     VBG:  Results from last 7 days   Lab Units 07/22/23  1451   ABG SOURCE  Radial, Right           Micro        EKG:   Imaging: I have personally reviewed pertinent reports. Intake and Output  I/O       07/21 0701 07/22 0700 07/22 0701 07/23 0700 07/23 0701 07/24 0700    I.V. (mL/kg)  1875.6 (30.1)     Total Intake(mL/kg)  1875.6 (30.1)     Urine (mL/kg/hr)  460     Total Output  460     Net  +1415.6                  Height and Weights   Height: 5' 5" (165.1 cm)  IBW (Ideal Body Weight): 61.5 kg  Body mass index is 22.86 kg/m². Weight (last 2 days)     Date/Time Weight    07/23/23 0535 62.3 (137.35)    07/22/23 1428 61.2 (134.92)    07/22/23 1348 61.2 (134.92)            Nutrition       Diet Orders   (From admission, onward)             Start     Ordered    07/22/23 1425  Diet NPO; Sips with meds  Diet effective now        References:    Adult Nutrition Support Algorithm    RD Therapeutic Diet Order Protocol   Question Answer Comment   Diet Type NPO    NPO Except: Sips with meds    RD to adjust diet per protocol?  Yes        07/22/23 1427                  Active Medications  Scheduled Meds:  Current Facility-Administered Medications   Medication Dose Route Frequency Provider Last Rate   • chlorhexidine  15 mL Mouth/Throat Q12H [facility] Brittany Ryan DO     • enoxaparin  40 mg Subcutaneous Daily Hank Redd DO     • ipratropium  0.5 mg Nebulization 4x Daily Debbie Garcia MD     • lactated ringers  50 mL/hr Intravenous Continuous Brittany Connor, DO 50 mL/hr (07/23/23 0601)   • levalbuterol  0.63 mg Nebulization TID Debbie Garcia MD     • pantoprazole  40 mg Intravenous Q24H [facility] Brittany Ryan DO     • propofol  5-50 mcg/kg/min Intravenous Titrated Brittany Connor, DO 5 mcg/kg/min (07/23/23 0601)     Continuous Infusions:  lactated ringers, 50 mL/hr, Last Rate: 50 mL/hr (07/23/23 0601)  propofol, 5-50 mcg/kg/min, Last Rate: 5 mcg/kg/min (07/23/23 0601)      PRN Meds:        Invasive Devices Review  Invasive Devices     Peripheral Intravenous Line  Duration           Peripheral IV 07/22/23 Dorsal (posterior); Right Forearm 1 day    Peripheral IV 07/22/23 Left Antecubital <1 day          Drain  Duration           NG/OG/Enteral Tube Orogastric Center mouth <1 day    Urethral Catheter Non-latex 16 Fr. <1 day          Airway  Duration           ETT  Cuffed;Oral 8 mm <1 day                Rationale for remaining devices:   ---------------------------------------------------------------------------------------  Advance Directive and Living Will:      Power of :    POLST:    ---------------------------------------------------------------------------------------  Care Time Delivered:   see attednding attestation above       Jr Harmon MD      Portions of the record may have been created with voice recognition software. Occasional wrong word or "sound a like" substitutions may have occurred due to the inherent limitations of voice recognition software.   Read the chart carefully and recognize, using context, where substitutions have occurred

## 2023-07-23 NOTE — UTILIZATION REVIEW
Initial Clinical Review    Pt initially presented to [facility] on 7/22. Leeanna Marquez Pt was [place] [facility] [place] higher level of care w/ surgical intervention on 7/22. Admission: Date/Time/Statement:   Admission Orders (From admission, onward)     Ordered        07/22/23 0902  Inpatient Admission  Once                      Orders Placed This Encounter   Procedures   • Inpatient Admission     Standing Status:   Standing     Number of Occurrences:   1     Order Specific Question:   Level of Care     Answer:   Med Surg [16]     Order Specific Question:   Estimated length of stay     Answer:   Inpatient Only Surgery       Initial Presentation: 82 y.o. male who presented as a transfer from [facility] by EMS to [facility] [place] a direct admission. Inpatient admission for evaluation and treatment of small bowel obstruction. PMHx: anemia, asthma, BPH, CAD, CHF, COPD, T2DM, GERD, DVT, HTN, PVD. Presented w/ abdominal pain. On exam, ill-appearing, on 5L O2, R groin area is a non-reducible tender R inguinal hernia, poorly responsive. Plan: to OR.     7/22/23 Surgery  Procedure: Right - REPAIR rECURRENT INCARERATED HERNIA INGUINAL OPEN. RIGHT ORCHIECTOMY  Indication:  Small bowel obstruction ([address]) [W86.461]; Recurrent right inguinal hernia [K40.91]  Anesthesia: General   ASA Score: 4 - Emergent  Findings:  Extensive scarring and fibrosis. Incarcerated recurrent inguinal hernia with bowel causing small bowel obstruction. Bowel was viable and reduced. Multiple other piece of mesh in the right groin. Due to the dense adhesions the spermatic cord was densely adherent medially to the pubic tubercle and the old mesh and unable to be  and therefore decision was made to perform a right orchiectomy. This also allowed better closure of the floor of the canal and a better repair. Post-op: Pt arrives to critical care unit intubated and sedated.  On exam, aortic bruit noted, hypoactive bowel sounds. Plan: continuous cardio-pulm monitoring, wean vent and sedation as able; q2h neuro and neurovascular checks, nebs, Trend labs, replete electrolytes as needed. Date: 07/23/23   Day 2: Weaning propofol gtt w/ hope of extubation later today. Exam: incisions CDI. Plan: wean vent, NPO, OGT, IVF, monitor abdominal exam, Trend labs, replete electrolytes as needed; neuro and neurovascular checks, continuous cardio-pulm monitoring, nebs.           Wt Readings from Last 1 Encounters:   07/23/23 62.3 kg (137 lb 5.6 oz)     Additional Vital Signs:   Date/Time Temp Pulse Resp BP MAP (mmHg) SpO2 Calculated FIO2 (%) - Nasal Cannula Nasal Cannula O2 Flow Rate (L/min) O2 Device   07/23/23 1200 -- 82 17 139/65 89 93 % -- -- --   07/23/23 1102 99.2 °F (37.3 °C) -- -- -- -- -- -- -- --   07/23/23 1000 -- 84 20 137/52 81 96 % 44 6 L/min Nasal cannula   07/23/23 0900 -- 88 17 144/91 118 96 % -- -- --   07/23/23 0839 -- -- -- -- -- 95 % -- -- --   07/23/23 0800 -- 76 13 140/63 88 96 % -- -- --   07/23/23 0700 -- 76 13 119/64 96 97 % -- -- Ventilator   07/23/23 0600 -- 78 12 142/63 93 96 % -- -- --   07/23/23 0526 -- -- -- -- -- 98 % -- -- --   07/23/23 0500 -- 70 16 140/70 100 99 % -- -- --   07/23/23 0400 -- 78 12 126/66 86 98 % -- -- --   07/23/23 0300 -- 82 14 154/71 111 93 % -- -- --   07/23/23 0200 -- 78 12 127/64 88 97 % -- -- --   07/23/23 0100 -- 80 11 Abnormal  117/64 82 97 % -- -- --   07/23/23 0000 -- 78 12 116/60 80 96 % -- -- --   07/22/23 2329 -- -- -- -- -- 97 % -- -- --   07/22/23 2303 99.1 °F (37.3 °C) 76 11 Abnormal  113/60 83 90 % -- -- --   07/22/23 2300 -- 76 12 116/63 85 94 % -- -- --   07/22/23 2200 -- 72 11 Abnormal  112/53 74 96 % -- -- --   07/22/23 2100 -- 74 12 119/60 89 95 % -- -- --   07/22/23 2000 -- 74 12 116/57 75 92 % -- -- --   07/22/23 1936 -- -- -- -- -- 95 % -- -- --   07/22/23 1900 97.5 °F (36.4 °C) 72 12 116/62 80 96 % -- -- --   07/22/23 1845 -- 72 12 124/61 80 96 % -- -- --   07/22/23 1830 -- 72 12 112/63 81 96 % -- -- --   07/22/23 1815 -- 72 12 113/59 82 96 % -- -- --   07/22/23 1800 -- 74 12 102/56 70 96 % -- -- --   07/22/23 1745 -- 76 11 Abnormal  97/49 Abnormal  65 95 % -- -- --   07/22/23 1730 -- 78 12 135/62 86 94 % -- -- --   07/22/23 1715 -- 80 18 128/77 99 97 % -- -- --   07/22/23 1700 -- 76 12 107/59 75 96 % -- -- --   07/22/23 1645 -- 74 12 111/62 77 96 % -- -- --   07/22/23 1630 -- 76 12 114/63 78 96 % -- -- --   07/22/23 1626 -- -- -- -- -- 96 % -- -- --   07/22/23 1615 -- 76 12 126/69 87 96 % -- -- --   07/22/23 1600 -- 74 13 104/59 76 96 % -- -- --   07/22/23 1545 -- 78 16 113/63 80 96 % -- -- --   07/22/23 1543 98 °F (36.7 °C) -- -- -- -- -- -- -- --   07/22/23 1530 -- 74 15 112/60 78 96 % -- -- --   07/22/23 1515 -- 78 15 110/62 77 95 % -- -- --   07/22/23 1500 -- 76 16 115/61 79 96 % -- -- --   07/22/23 1445 -- 80 16 106/63 78 96 % -- -- --   07/22/23 1430 -- 80 16 122/70 88 95 % -- -- --   07/22/23 1415 -- 80 16 142/77 102 95 % -- -- --   07/22/23 1347 -- 78 16 159/78 104 99 % -- -- Ventilator   07/22/23 1338 -- -- -- -- -- 99 % -- -- --   07/22/23 0818 98.4 °F (36.9 °C) 94 16 143/91 108 91 % 28 2 L/min Nasal cannula       Andrea Coma Scale  Date and Time Eye Opening Best Verbal Response Best Motor Response Andrea Coma Scale Score   07/23/23 1155 4 4 6 14   07/23/23 0725 4 1 6 11   07/23/23 0600 3 1 6 10   07/23/23 0400 3 1 6 10   07/23/23 0200 3 1 4 8   07/23/23 0000 3 1 4 8   07/22/23 2200 3 1 4 8   07/22/23 2000 3 1 4 8   07/22/23 1703 1 1 4 6   07/22/23 1348 1 1 4 6           Pertinent Labs/Diagnostic Test Results:   Results from last 7 days   Lab Units 07/23/23  1107 07/23/23  0610 07/22/23  0324   WBC Thousand/uL  --  7.68 7.60   HEMOGLOBIN g/dL 10.7* 11.8* 15.5   HEMATOCRIT %  --  41.1 52.2*   PLATELETS Thousands/uL  --  102* 128*   NEUTROS ABS Thousands/µL  --  5.02 5.43         Results from last 7 days   Lab Units 07/23/23  0529 07/22/23  0324   SODIUM mmol/L 139 142   POTASSIUM mmol/L 4.0 4.3   CHLORIDE mmol/L 99 95*   CO2 mmol/L 34* 43*   ANION GAP mmol/L 6 4   BUN mg/dL 23 16   CREATININE mg/dL 1.01 0.96   EGFR ml/min/1.73sq m 67 71   CALCIUM mg/dL 8.5 10.1   MAGNESIUM mg/dL 2.0  --      Results from last 7 days   Lab Units 07/22/23  0324   AST U/L 20   ALT U/L 11   ALK PHOS U/L 69   TOTAL PROTEIN g/dL 7.7   ALBUMIN g/dL 4.2   TOTAL BILIRUBIN mg/dL 0.81         Results from last 7 days   Lab Units 07/23/23  0529 07/22/23  0324   GLUCOSE RANDOM mg/dL 113 109     Results from last 7 days   Lab Units 07/22/23  1451   PH ART  7.462*   PCO2 ART mm Hg 51.4*   PO2 ART mm Hg 77.1   HCO3 ART mmol/L 35.9*   BASE EXC ART mmol/L 10.3   O2 CONTENT ART mL/dL 18.9   O2 HGB, ARTERIAL % 94.7   ABG SOURCE  Radial, Right       Results from last 7 days   Lab Units 07/22/23  0529 07/22/23  0324   HS TNI 0HR ng/L  --  16   HS TNI 2HR ng/L 20  --    HSTNI D2 ng/L 4  --          Results from last 7 days   Lab Units 07/22/23  0600   PROTIME seconds 13.3   INR  0.98   PTT seconds 32     Results from last 7 days   Lab Units 07/22/23  0324   BNP pg/mL 75     Results from last 7 days   Lab Units 07/22/23  0324   LIPASE u/L 20     Results from last 7 days   Lab Units 07/22/23  0336   CLARITY UA  Cloudy*   COLOR UA  Straw   SPEC GRAV UA  1.020   PH UA  8.0   GLUCOSE UA mg/dl Negative   KETONES UA mg/dl Negative   BLOOD UA  Negative   PROTEIN UA mg/dl Negative   NITRITE UA  Negative   BILIRUBIN UA  Negative   UROBILINOGEN UA mg/dL Negative   LEUKOCYTES UA  Negative         Past Medical History:   Diagnosis Date   • Acute metabolic encephalopathy 76/25/5374   • Anemia    • Aneurysm, aorta, thoracic (HCC)    • Appendicolith    • Ascending aortic aneurysm (HCC)     3.7   • Asthma    • BPH (benign prostatic hyperplasia)    • CAD (coronary artery disease)     noted on CT scan   • CHF (congestive heart failure) (HCC)    • COPD (chronic obstructive pulmonary disease) ([address]) • Descending thoracic aortic aneurysm (HCC)    • Diabetes mellitus ([address])    • Diverticulosis    • Former tobacco use    • GERD (gastroesophageal reflux disease)    • History of DVT (deep vein thrombosis)     Left leg   • History of transfusion    • Hypertension    • Inguinal hernia     right   • Nephrolithiasis    • Oxygen dependent     2LNC   • Oxygen dependent    • Pneumonia    • Pre-diabetes    • Prostate calculus    • PVD (peripheral vascular disease) ([address])    • Thoracic aortic aneurysm without rupture ([address]) 11/19/2018    Added automatically from request for surgery [id number]   • Thrombocytopenia ([address]) 12/29/2018   • Ulcer    • 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 male  Admission Orders:  Clear Liquid Diet. Fall precautions. Continuous Cardio-Pulm monitoring. DW. I&O. SCDs. q2h neuro checks. Mechanical Vent.     Scheduled Medications:  chlorhexidine, 15 mL, Mouth/Throat, Q12H [facility]  enoxaparin, 40 mg, Subcutaneous, Daily  pantoprazole, 40 mg, Intravenous, Q24H [facility]  umeclidinium-vilanterol, 1 puff, Inhalation, Daily      Continuous IV Infusions:  lactated ringers, 50 mL/hr, Intravenous, Continuous    propofol (DIPRIVAN) 1000 mg in 100 mL infusion (premix)  Rate: 1.8-18.4 mL/hr Dose: 5-50 mcg/kg/min  Weight Dosing Info: 61.2 kg  Freq: Titrated Route: IV  Last Dose: Stopped (07/23/23 0921)  Start: 07/22/23 1430 End: 07/23/23 1203    PRN Meds:  levalbuterol, 0.63 mg, Nebulization, Q6H PRN    Discontinued Meds:  ipratropium (ATROVENT) 0.02 % inhalation solution 0.5 mg  Dose: 0.5 mg  Freq: 4 times daily (RESP) Route: NEBULIZATION  Start: 07/23/23 0900 End: 07/23/23 1216  levalbuterol (XOPENEX) inhalation solution 0.63 mg  Dose: 0.63 mg  Freq: 3 times daily (RESP) Route: NEBULIZATION  Start: 07/23/23 0900 End: 07/23/23 1216        IP CONSULT TO CASE MANAGEMENT    Network Utilization Review Department  ATTENTION: Please call with any questions or concerns to [phone] and carefully listen to the prompts so that you are directed to the right person. All voicemails are confidential.  Cecilia Quintero all requests for admission clinical reviews, approved or denied determinations and any other requests to dedicated fax number below belonging to the campus where the patient is receiving treatment.  List of dedicated fax numbers for the Facilities:  [place] DENIALS (Administrative/Medical Necessity) [phone] [address] [facility] (Maternity/NICU/Pediatrics) [phone]   [address] [place] [address] Drive [phone]   [facility] [address] [facility] [phone]   [facility] [id number] [place] [address] [phone]   [address] [address] [phone]

## 2023-07-23 NOTE — ASSESSMENT & PLAN NOTE
· Admit to the intensive care unit  · Continuous cardiopulmonary monitoring  · Patient remains intubated on mechanical ventilation- will attempt SBT today and hopefully extubate patient   · Xopenex and Atrovent as needed  · Secondary to COPD and congestive heart failure

## 2023-07-24 LAB
ANION GAP SERPL CALCULATED.3IONS-SCNC: 2 MMOL/L
BUN SERPL-MCNC: 18 MG/DL (ref 5–25)
CALCIUM SERPL-MCNC: 8.5 MG/DL (ref 8.4–10.2)
CHLORIDE SERPL-SCNC: 100 MMOL/L (ref 96–108)
CO2 SERPL-SCNC: 36 MMOL/L (ref 21–32)
CREAT SERPL-MCNC: 0.79 MG/DL (ref 0.6–1.3)
ERYTHROCYTE [DISTWIDTH] IN BLOOD BY AUTOMATED COUNT: 13.7 % (ref 11.6–15.1)
GFR SERPL CREATININE-BSD FRML MDRD: 81 ML/MIN/1.73SQ M
GLUCOSE SERPL-MCNC: 110 MG/DL (ref 65–140)
HCT VFR BLD AUTO: 44.6 % (ref 36.5–49.3)
HGB BLD-MCNC: 12.6 G/DL (ref 12–17)
MAGNESIUM SERPL-MCNC: 2.1 MG/DL (ref 1.9–2.7)
MCH RBC QN AUTO: 29.8 PG (ref 26.8–34.3)
MCHC RBC AUTO-ENTMCNC: 28.3 G/DL (ref 31.4–37.4)
MCV RBC AUTO: 105 FL (ref 82–98)
PHOSPHATE SERPL-MCNC: 2.5 MG/DL (ref 2.3–4.1)
PLATELET # BLD AUTO: 101 THOUSANDS/UL (ref 149–390)
PMV BLD AUTO: 9.7 FL (ref 8.9–12.7)
POTASSIUM SERPL-SCNC: 3.8 MMOL/L (ref 3.5–5.3)
RBC # BLD AUTO: 4.23 MILLION/UL (ref 3.88–5.62)
SODIUM SERPL-SCNC: 138 MMOL/L (ref 135–147)
WBC # BLD AUTO: 9.34 THOUSAND/UL (ref 4.31–10.16)

## 2023-07-24 PROCEDURE — 99232 SBSQ HOSP IP/OBS MODERATE 35: CPT | Performed by: INTERNAL MEDICINE

## 2023-07-24 PROCEDURE — 80048 BASIC METABOLIC PNL TOTAL CA: CPT | Performed by: NURSE PRACTITIONER

## 2023-07-24 PROCEDURE — 84100 ASSAY OF PHOSPHORUS: CPT

## 2023-07-24 PROCEDURE — 83735 ASSAY OF MAGNESIUM: CPT | Performed by: NURSE PRACTITIONER

## 2023-07-24 PROCEDURE — 99231 SBSQ HOSP IP/OBS SF/LOW 25: CPT | Performed by: SURGERY

## 2023-07-24 PROCEDURE — 85027 COMPLETE CBC AUTOMATED: CPT | Performed by: NURSE PRACTITIONER

## 2023-07-24 RX ORDER — BRIMONIDINE TARTRATE 2 MG/ML
1 SOLUTION/ DROPS OPHTHALMIC 2 TIMES DAILY
Status: DISCONTINUED | OUTPATIENT
Start: 2023-07-24 | End: 2023-07-26 | Stop reason: HOSPADM

## 2023-07-24 RX ORDER — ACETAMINOPHEN 325 MG/1
650 TABLET ORAL EVERY 6 HOURS PRN
Status: DISCONTINUED | OUTPATIENT
Start: 2023-07-24 | End: 2023-07-26 | Stop reason: HOSPADM

## 2023-07-24 RX ORDER — LANOLIN ALCOHOL/MO/W.PET/CERES
6 CREAM (GRAM) TOPICAL
Status: DISCONTINUED | OUTPATIENT
Start: 2023-07-24 | End: 2023-07-26 | Stop reason: HOSPADM

## 2023-07-24 RX ORDER — SIMETHICONE 80 MG
80 TABLET,CHEWABLE ORAL EVERY 6 HOURS PRN
Status: DISCONTINUED | OUTPATIENT
Start: 2023-07-24 | End: 2023-07-26 | Stop reason: HOSPADM

## 2023-07-24 RX ORDER — PANTOPRAZOLE SODIUM 40 MG/1
40 TABLET, DELAYED RELEASE ORAL
Status: DISCONTINUED | OUTPATIENT
Start: 2023-07-24 | End: 2023-07-26 | Stop reason: HOSPADM

## 2023-07-24 RX ORDER — POTASSIUM CHLORIDE 20 MEQ/1
20 TABLET, EXTENDED RELEASE ORAL ONCE
Status: COMPLETED | OUTPATIENT
Start: 2023-07-24 | End: 2023-07-24

## 2023-07-24 RX ADMIN — BRIMONIDINE TARTRATE 1 DROP: 2 SOLUTION OPHTHALMIC at 21:02

## 2023-07-24 RX ADMIN — SIMETHICONE 80 MG: 80 TABLET, CHEWABLE ORAL at 21:02

## 2023-07-24 RX ADMIN — POTASSIUM CHLORIDE 20 MEQ: 1500 TABLET, EXTENDED RELEASE ORAL at 12:56

## 2023-07-24 RX ADMIN — MELATONIN 6 MG: at 00:58

## 2023-07-24 RX ADMIN — ACETAMINOPHEN 650 MG: 325 TABLET, FILM COATED ORAL at 00:10

## 2023-07-24 RX ADMIN — BRIMONIDINE TARTRATE 1 DROP: 2 SOLUTION OPHTHALMIC at 12:56

## 2023-07-24 RX ADMIN — UMECLIDINIUM BROMIDE AND VILANTEROL TRIFENATATE 1 PUFF: 62.5; 25 POWDER RESPIRATORY (INHALATION) at 08:25

## 2023-07-24 RX ADMIN — ENOXAPARIN SODIUM 40 MG: 40 INJECTION SUBCUTANEOUS at 08:22

## 2023-07-24 RX ADMIN — PANTOPRAZOLE SODIUM 40 MG: 40 TABLET, DELAYED RELEASE ORAL at 08:22

## 2023-07-24 RX ADMIN — MELATONIN 6 MG: at 21:02

## 2023-07-24 RX ADMIN — CHLORHEXIDINE GLUCONATE 15 ML: 1.2 RINSE ORAL at 08:22

## 2023-07-24 NOTE — ASSESSMENT & PLAN NOTE
Wt Readings from Last 3 Encounters:   07/23/23 62.3 kg (137 lb 5.6 oz)   07/22/23 58.7 kg (129 lb 6.6 oz)   04/26/23 62.6 kg (138 lb)     · Chest x-ray done today 22 July 2023 does not appear to be volume overloaded  · Diuresis as needed  · Monitor electrolytes Doxepin Counseling:  Patient advised that the medication is sedating and not to drive a car after taking this medication. Patient informed of potential adverse effects including but not limited to dry mouth, urinary retention, and blurry vision.  The patient verbalized understanding of the proper use and possible adverse effects of doxepin.  All of the patient's questions and concerns were addressed.

## 2023-07-24 NOTE — PROGRESS NOTES
[address] [facility]  Progress Note  Name: Dennis Fitzpatrick  MRN: [id number]  Unit/Bed#: ICU 05 I Date of Admission: 7/22/2023   Date of Service: 7/24/2023 I Hospital Day: 2    Assessment/Plan   Recurrent right inguinal hernia w incarcertion  Assessment & Plan  · Status post surgical reduction and repair  · Management per the surgical teams recommendations    COPD without exacerbation [facility])  Assessment & Plan  · Extubated on 7/23  · Patient requires 2 L of oxygen via nasal cannula at baseline at home  · Encourage cough, deep breathing and IS use  · Increasing activity    Accelerated essential hypertension  Assessment & Plan  · Continuous cardiopulmonary monitoring  · Treat hypertension as needed    Chronic kidney disease, stage 3a [facility])  Assessment & Plan  Lab Results   Component Value Date    EGFR 67 07/23/2023    EGFR 71 07/22/2023    EGFR 66 04/10/2023    CREATININE 1.01 07/23/2023    CREATININE 0.96 07/22/2023    CREATININE 1.02 04/10/2023   · Monitor kidney indices  · Avoid nephrotoxic medications      Acute on chronic respiratory failure (HCC)  Assessment & Plan  · Extubated on 7/23  · Xopenex and Atrovent as needed  · Secondary to COPD and congestive heart failure    Chronic diastolic CHF (congestive heart failure) ([address])  Assessment & Plan  Wt Readings from Last 3 Encounters:   07/23/23 62.3 kg (137 lb 5.6 oz)   07/22/23 58.7 kg (129 lb 6.6 oz)   04/26/23 62.6 kg (138 lb)     · Chest x-ray done today 22 July 2023 does not appear to be volume overloaded  · Diuresis as needed  · Monitor electrolytes          Abdominal pain  Assessment & Plan  · Small bowel obstruction secondary to recurring incarcerated right inguinal hernia. 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   · Patient transferred to the intensive care unit intubated secondary to history of severe COPD and congestive heart failure. · Extubated  · Encourage cough, deep breathing and IS use           ICU Core Measures     A: Assess, Prevent, and Manage Pain · Has pain been assessed? Yes  · Need for changes to pain regimen? No   B: Both SAT/SAT  · N/A   C: Choice of Sedation · RASS Goal: 0 Alert and Calm or N/A patient not on sedation  · Need for changes to sedation or analgesia regimen? NA   D: Delirium · CAM-ICU: Negative   E: Early Mobility  · Plan for early mobility? Yes   F: Family Engagement · Plan for family engagement today? Yes       Review of Invasive Devices:          Prophylaxis:  VTE VTE covered by:  enoxaparin, Subcutaneous, 40 mg at 07/23/23 0940       Stress Ulcer  covered bypantoprazole (PROTONIX) injection 40 mg [[id number]]     Subjective   HPI/24hr events: No events overnight    Review of Systems   All other systems reviewed and are negative. Objective                            Vitals I/O      Most Recent Min/Max in 24hrs   Temp 98.6 °F (37 °C) Temp  Min: 98.6 °F (37 °C)  Max: 101.2 °F (38.4 °C)   Pulse 68 Pulse  Min: 68  Max: 98   Resp (!) 28 Resp  Min: 12  Max: 35   /61 BP  Min: 111/61  Max: 155/81   O2 Sat 99 % SpO2  Min: 93 %  Max: 100 %      Intake/Output Summary (Last 24 hours) at 7/24/2023 0511  Last data filed at 7/24/2023 0054  Gross per 24 hour   Intake 1652.79 ml   Output 710 ml   Net 942.79 ml         Diet Clear Liquid     Invasive Monitoring Physical exam    Physical Exam  HENT:      Head: Normocephalic. Mouth/Throat:      Mouth: Mucous membranes are dry. Eyes:      Pupils: Pupils are equal, round, and reactive to light. Cardiovascular:      Rate and Rhythm: Normal rate. Pulmonary:      Effort: Pulmonary effort is normal.      Breath sounds: Normal breath sounds. Abdominal:      Palpations: Abdomen is soft. Tenderness: There is no abdominal tenderness. Musculoskeletal:         General: No swelling. Cervical back: Neck supple. Skin:     General: Skin is warm and dry. Neurological:      General: No focal deficit present. Mental Status: He is alert. Diagnostic Studies    EKG:   Imaging:  I have personally reviewed pertinent reports.    and I have personally reviewed pertinent films in PACS     Medications:  Scheduled PRN   chlorhexidine, 15 mL, Q12H [facility]  enoxaparin, 40 mg, Daily  melatonin, 6 mg, HS  pantoprazole, 40 mg, Q24H [facility]  umeclidinium-vilanterol, 1 puff, Daily      acetaminophen, 650 mg, Q6H PRN  levalbuterol, 0.63 mg, Q6H PRN       Continuous    lactated ringers, 50 mL/hr, Last Rate: Stopped (07/24/23 0054)         Labs:    CBC    Recent Labs     07/23/23  0610 07/23/23  1107   WBC 7.68  --    HGB 11.8* 10.7*   HCT 41.1  --    *  --      BMP    Recent Labs     07/23/23  0529   SODIUM 139   K 4.0   CL 99   CO2 34*   AGAP 6   BUN 23   CREATININE 1.01   CALCIUM 8.5       Coags    Recent Labs     07/22/23  0600   INR 0.98   PTT 32        Additional Electrolytes  Recent Labs     07/23/23  0529   MG 2.0          Blood Gas    Recent Labs     07/22/23  1451   PHART 7.462*   NVC0UKD 51.4*   PO2ART 77.1   [id number] 35.9*   BEART 10.3   SOURCE Radial, Right     Recent Labs     07/22/23  1451   SOURCE Radial, Right    LFTs  No recent results    Infectious  No recent results  Glucose  Recent Labs     07/23/23  [address]   GLUC [address], CRNP

## 2023-07-24 NOTE — PROGRESS NOTES
Progress Note - General Surgery   [place] 82 y.o. male MRN: [id number]  Unit/Bed#: ICU 05 Encounter: [id number]    Assessment:  82 M with PMH of COPD, CHF, CKD with recurrent RIH concern for incarceration/strangulation. Now s/p RIHR, R orchiectomy 7/22    AVSS on room air    Physical exam: Right groin swelling    Plan:  • Wean nasal cannula  • Clear liquid diet  • IVF  • Monitor abdominal exam  • Continue to follow right groin exam, consider possible ultrasound  • Please TigerText on call [facility] surgery resident or AP with any questions       Subjective/Objective     Subjective:   No acute events overnight. No subjective abdominal pain. No nausea no vomiting. No flatus no bowel movements. Objective:     Blood pressure 121/62, pulse 94, temperature 98.9 °F (37.2 °C), temperature source Axillary, resp. rate (!) 27, height 5' 5" (1.651 m), weight 62.3 kg (137 lb 5.6 oz), SpO2 95 %. ,Body mass index is 22.86 kg/m². Intake/Output Summary (Last 24 hours) at 7/23/2023 2151  Last data filed at 7/23/2023 1602  Gross per 24 hour   Intake 1505.46 ml   Output 840 ml   Net 665.46 ml       Invasive Devices     Peripheral Intravenous Line  Duration           Peripheral IV 07/22/23 Dorsal (posterior); Right Forearm 1 day    Peripheral IV 07/22/23 Left Antecubital 1 day                Physical Exam:   Gen: NAD, Comfortable  Neuro: A&O, No focal deficits  Head: Normal Cephalic, Atraumatic  Eye: EOMI, PERRLA, No scleral icterus  Neck: Supple, No JVD, Midline trachea  CV: RRR, Cap refill <2 sec  Pulm: Normal work of breathing, no respiratory distress  Abd: Soft, Non-Distended, tender right groin, right groin bulge noted  Ext: No edema, Non-tender  Skin: warm, dry, intact

## 2023-07-24 NOTE — PLAN OF CARE
Problem: Prexisting or High Potential for Compromised Skin Integrity  Goal: Skin integrity is maintained or improved  Description: INTERVENTIONS:  - Identify patients at risk for skin breakdown  - Assess and monitor skin integrity  - Assess and monitor nutrition and hydration status  - Monitor labs   - Assess for incontinence   - Turn and reposition patient  - Assist with mobility/ambulation  - Relieve pressure over bony prominences  - Avoid friction and shearing  - Provide appropriate hygiene as needed including keeping skin clean and dry  - Evaluate need for skin moisturizer/barrier cream  - Collaborate with interdisciplinary team   - Patient/family teaching  - Consider wound care consult   Outcome: Progressing     Problem: MOBILITY - ADULT  Goal: Maintain or return to baseline ADL function  Description: INTERVENTIONS:  -  Assess patient's ability to carry out ADLs; assess patient's baseline for ADL function and identify physical deficits which impact ability to perform ADLs (bathing, care of mouth/teeth, toileting, grooming, dressing, etc.)  - Assess/evaluate cause of self-care deficits   - Assess range of motion  - Assess patient's mobility; develop plan if impaired  - Assess patient's need for assistive devices and provide as appropriate  - Encourage maximum independence but intervene and supervise when necessary  - Involve family in performance of ADLs  - Assess for home care needs following discharge   - Consider OT consult to assist with ADL evaluation and planning for discharge  - Provide patient education as appropriate  Outcome: Progressing  Goal: Maintains/Returns to pre admission functional level  Description: INTERVENTIONS:  - Perform BMAT or MOVE assessment daily.   - Set and communicate daily mobility goal to care team and patient/family/caregiver. - Collaborate with rehabilitation services on mobility goals if consulted  - Perform Range of Motion  times a day.   - Reposition patient every hours.  - Dangle patient  times a day  - Stand patient  times a day  - Ambulate patient  times a day  - Out of bed to chair  times a day   - Out of bed for meals  times a day  - Out of bed for toileting  - Record patient progress and toleration of activity level   Outcome: Progressing     Problem: PAIN - ADULT  Goal: Verbalizes/displays adequate comfort level or baseline comfort level  Description: Interventions:  - Encourage patient to monitor pain and request assistance  - Assess pain using appropriate pain scale  - Administer analgesics based on type and severity of pain and evaluate response  - Implement non-pharmacological measures as appropriate and evaluate response  - Consider cultural and social influences on pain and pain management  - Notify physician/advanced practitioner if interventions unsuccessful or patient reports new pain  Outcome: Progressing     Problem: INFECTION - ADULT  Goal: Absence or prevention of progression during hospitalization  Description: INTERVENTIONS:  - Assess and monitor for signs and symptoms of infection  - Monitor lab/diagnostic results  - Monitor all insertion sites, i.e. indwelling lines, tubes, and drains  - Monitor endotracheal if appropriate and nasal secretions for changes in amount and color  - [place] appropriate cooling/warming therapies per order  - Administer medications as ordered  - Instruct and encourage patient and family to use good hand hygiene technique  - Identify and instruct in appropriate isolation precautions for identified infection/condition  Outcome: Progressing     Problem: SAFETY ADULT  Goal: Maintain or return to baseline ADL function  Description: INTERVENTIONS:  -  Assess patient's ability to carry out ADLs; assess patient's baseline for ADL function and identify physical deficits which impact ability to perform ADLs (bathing, care of mouth/teeth, toileting, grooming, dressing, etc.)  - Assess/evaluate cause of self-care deficits   - Assess range of motion  - Assess patient's mobility; develop plan if impaired  - Assess patient's need for assistive devices and provide as appropriate  - Encourage maximum independence but intervene and supervise when necessary  - Involve family in performance of ADLs  - Assess for home care needs following discharge   - Consider OT consult to assist with ADL evaluation and planning for discharge  - Provide patient education as appropriate  Outcome: Progressing  Goal: Maintains/Returns to pre admission functional level  Description: INTERVENTIONS:  - Perform BMAT or MOVE assessment daily.   - Set and communicate daily mobility goal to care team and patient/family/caregiver. - Collaborate with rehabilitation services on mobility goals if consulted  - Perform Range of Motion  times a day. - Reposition patient every  hours.   - Dangle patient  times a day  - Stand patient  times a day  - Ambulate patient  times a day  - Out of bed to chair  times a day   - Out of bed for meals  times a day  - Out of bed for toileting  - Record patient progress and toleration of activity level   Outcome: Progressing  Goal: Patient will remain free of falls  Description: INTERVENTIONS:  - Educate patient/family on patient safety including physical limitations  - Instruct patient to call for assistance with activity   - Consult OT/PT to assist with strengthening/mobility   - Keep Call bell within reach  - Keep bed low and locked with side rails adjusted as appropriate  - Keep care items and personal belongings within reach  - Initiate and maintain comfort rounds  - Make Fall Risk Sign visible to staff  - Offer Toileting every  Hours, in advance of need  - Initiate/Maintain alarm  - Obtain necessary fall risk management equipment:   - Apply yellow socks and bracelet for high fall risk patients  - Consider moving patient to room near nurses station  Outcome: Progressing     Problem: DISCHARGE PLANNING  Goal: Discharge to home or other facility with appropriate resources  Description: INTERVENTIONS:  - Identify barriers to discharge w/patient and caregiver  - Arrange for needed discharge resources and transportation as appropriate  - Identify discharge learning needs (meds, wound care, etc.)  - Arrange for interpretive services to assist at discharge as needed  - Refer to Case Management Department for coordinating discharge planning if the patient needs post-hospital services based on physician/advanced practitioner order or complex needs related to functional status, cognitive ability, or social support system  Outcome: Progressing     Problem: Knowledge Deficit  Goal: Patient/family/caregiver demonstrates understanding of disease process, treatment plan, medications, and discharge instructions  Description: Complete learning assessment and assess knowledge base.   Interventions:  - Provide teaching at level of understanding  - Provide teaching via preferred learning methods  Outcome: Progressing

## 2023-07-24 NOTE — ASSESSMENT & PLAN NOTE
>>ASSESSMENT AND PLAN FOR COPD WITHOUT EXACERBATION ([address]) WRITTEN ON 7/23/2023 10:39 PM BY SRINIVAS Tejadaist    Extubated on 7/23  Patient requires 2 L of oxygen via nasal cannula at baseline at home  Encourage cough, deep breathing and IS use  Increasing activity

## 2023-07-24 NOTE — QUICK NOTE
Patient transferred to med surg. Sign out give to Surgical Resident and SLIM    No changes to patients plan   Continue to trend fevers.    1x fever overnight that responded to tylenol

## 2023-07-24 NOTE — ASSESSMENT & PLAN NOTE
>>ASSESSMENT AND PLAN FOR ACCELERATED ESSENTIAL HYPERTENSION WRITTEN ON 7/23/2023 10:36 PM BY RAQUEL FARRAR    Continuous cardiopulmonary monitoring  Treat hypertension as needed

## 2023-07-24 NOTE — ASSESSMENT & PLAN NOTE
· Extubated on 7/23  · Patient requires 2 L of oxygen via nasal cannula at baseline at home  · Encourage cough, deep breathing and IS use  · Increasing activity

## 2023-07-24 NOTE — UTILIZATION REVIEW
Notification of Unplanned, Urgent, or   Emergency Inpatient Admission   [address] [facility]  Tax ID: [id number]  NPI: [id number]  Place of Service: [address]  Admission Level of Care: Inpatient  Place of Service Code: 24     Attending Physician Information  Attending Name and NPI#: Giacomo Wong Md [[id number]  Phone: [phone]     Admission Information  Inpatient Admission Date/Time: 7/22/23  7:51 AM  Discharge Date/Time: No discharge date for patient encounter. Admitting Diagnosis Code/Description:  SBO (small bowel obstruction) ([address]) [K56.609]     Utilization Review Contact  Viridiana Glasgow Utilization   Phone: [phone]  Fax: [phone]  Email: [email or web address]  Contact for approvals/pending authorizations, clinical reviews, and discharge. Physician Advisory Services Contact  Medical Necessity Denial & Anyp-ji-Fkrm Discussion  Phone: [phone]  Fax: [phone]  Email: [email or web address]

## 2023-07-24 NOTE — ASSESSMENT & PLAN NOTE
· Extubated on 7/23  · Xopenex and Atrovent as needed  · Secondary to COPD and congestive heart failure

## 2023-07-24 NOTE — ASSESSMENT & PLAN NOTE
Lab Results   Component Value Date    EGFR 67 07/23/2023    EGFR 71 07/22/2023    EGFR 66 04/10/2023    CREATININE 1.01 07/23/2023    CREATININE 0.96 07/22/2023    CREATININE 1.02 04/10/2023   · Monitor kidney indices  · Avoid nephrotoxic medications

## 2023-07-24 NOTE — ASSESSMENT & PLAN NOTE
· Small bowel obstruction secondary to recurring incarcerated right inguinal hernia. 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 · Patient transferred to the intensive care unit intubated secondary to history of severe COPD and congestive heart failure.   · Extubated  · Encourage cough, deep breathing and IS use

## 2023-07-24 NOTE — UTILIZATION REVIEW
Date: 7/24   Day 3: Has surpassed a 2nd midnight with active treatments and services, which include extubated overnight . Labs revealed elevated bicarb , macrocytosis w/o anemia and thrombocytopenia . CT w / SBO . Wean O2 prn , diet adv.

## 2023-07-24 NOTE — PLAN OF CARE
Problem: Prexisting or High Potential for Compromised Skin Integrity  Goal: Skin integrity is maintained or improved  Description: INTERVENTIONS:  - Identify patients at risk for skin breakdown  - Assess and monitor skin integrity  - Assess and monitor nutrition and hydration status  - Monitor labs   - Assess for incontinence   - Turn and reposition patient  - Assist with mobility/ambulation  - Relieve pressure over bony prominences  - Avoid friction and shearing  - Provide appropriate hygiene as needed including keeping skin clean and dry  - Evaluate need for skin moisturizer/barrier cream  - Collaborate with interdisciplinary team   - Patient/family teaching  - Consider wound care consult   Outcome: Progressing     Problem: MOBILITY - ADULT  Goal: Maintain or return to baseline ADL function  Description: INTERVENTIONS:  -  Assess patient's ability to carry out ADLs; assess patient's baseline for ADL function and identify physical deficits which impact ability to perform ADLs (bathing, care of mouth/teeth, toileting, grooming, dressing, etc.)  - Assess/evaluate cause of self-care deficits   - Assess range of motion  - Assess patient's mobility; develop plan if impaired  - Assess patient's need for assistive devices and provide as appropriate  - Encourage maximum independence but intervene and supervise when necessary  - Involve family in performance of ADLs  - Assess for home care needs following discharge   - Consider OT consult to assist with ADL evaluation and planning for discharge  - Provide patient education as appropriate  Outcome: Progressing    Problem: PAIN - ADULT  Goal: Verbalizes/displays adequate comfort level or baseline comfort level  Description: Interventions:  - Encourage patient to monitor pain and request assistance  - Assess pain using appropriate pain scale  - Administer analgesics based on type and severity of pain and evaluate response  - Implement non-pharmacological measures as appropriate and evaluate response  - Consider cultural and social influences on pain and pain management  - Notify physician/advanced practitioner if interventions unsuccessful or patient reports new pain  Outcome: Progressing     Problem: INFECTION - ADULT  Goal: Absence or prevention of progression during hospitalization  Description: INTERVENTIONS:  - Assess and monitor for signs and symptoms of infection  - Monitor lab/diagnostic results  - Monitor all insertion sites, i.e. indwelling lines, tubes, and drains  - Monitor endotracheal if appropriate and nasal secretions for changes in amount and color  - [place] appropriate cooling/warming therapies per order  - Administer medications as ordered  - Instruct and encourage patient and family to use good hand hygiene technique  - Identify and instruct in appropriate isolation precautions for identified infection/condition  Outcome: Progressing     Problem: SAFETY ADULT  Goal: Maintain or return to baseline ADL function  Description: INTERVENTIONS:  -  Assess patient's ability to carry out ADLs; assess patient's baseline for ADL function and identify physical deficits which impact ability to perform ADLs (bathing, care of mouth/teeth, toileting, grooming, dressing, etc.)  - Assess/evaluate cause of self-care deficits   - Assess range of motion  - Assess patient's mobility; develop plan if impaired  - Assess patient's need for assistive devices and provide as appropriate  - Encourage maximum independence but intervene and supervise when necessary  - Involve family in performance of ADLs  - Assess for home care needs following discharge   - Consider OT consult to assist with ADL evaluation and planning for discharge  - Provide patient education as appropriate  Outcome: Progressing  Goal: Maintains/Returns to pre admission functional level  Description: INTERVENTIONS:  - Perform BMAT or MOVE assessment daily.   - Set and communicate daily mobility goal to care team and patient/family/caregiver. - Collaborate with rehabilitation services on mobility goals if consulted  - Perform Range of Motion 6 times a day. - Reposition patient every 2 hours.   - Dangle patient 3 times a day  - Stand patient 3 times a day  - Ambulate patient 3 times a day  - Out of bed to chair 3 times a day   - Out of bed for meals 3 times a day  - Out of bed for toileting  - Record patient progress and toleration of activity level   Outcome: Progressing  Goal: Patient will remain free of falls  Description: INTERVENTIONS:  - Educate patient/family on patient safety including physical limitations  - Instruct patient to call for assistance with activity   - Consult OT/PT to assist with strengthening/mobility   - Keep Call bell within reach  - Keep bed low and locked with side rails adjusted as appropriate  - Keep care items and personal belongings within reach  - Initiate and maintain comfort rounds  - Make Fall Risk Sign visible to staff  - Offer Toileting every 2 Hours, in advance of need  - Initiate/Maintain bed alarm  - Obtain necessary fall risk management equipment: yellow sign, yellow bracelet  - Apply yellow socks and bracelet for high fall risk patients  - Consider moving patient to room near nurses station  Outcome: Progressing     Problem: DISCHARGE PLANNING  Goal: Discharge to home or other facility with appropriate resources  Description: INTERVENTIONS:  - Identify barriers to discharge w/patient and caregiver  - Arrange for needed discharge resources and transportation as appropriate  - Identify discharge learning needs (meds, wound care, etc.)  - Arrange for interpretive services to assist at discharge as needed  - Refer to Case Management Department for coordinating discharge planning if the patient needs post-hospital services based on physician/advanced practitioner order or complex needs related to functional status, cognitive ability, or social support system  Outcome: Progressing Problem: Knowledge Deficit  Goal: Patient/family/caregiver demonstrates understanding of disease process, treatment plan, medications, and discharge instructions  Description: Complete learning assessment and assess knowledge base.   Interventions:  - Provide teaching at level of understanding  - Provide teaching via preferred learning methods  Outcome: Progressing

## 2023-07-25 LAB
ANION GAP SERPL CALCULATED.3IONS-SCNC: 1 MMOL/L
BASOPHILS # BLD AUTO: 0.02 THOUSANDS/ÂΜL (ref 0–0.1)
BASOPHILS NFR BLD AUTO: 0 % (ref 0–1)
BUN SERPL-MCNC: 15 MG/DL (ref 5–25)
CALCIUM SERPL-MCNC: 8.5 MG/DL (ref 8.4–10.2)
CHLORIDE SERPL-SCNC: 100 MMOL/L (ref 96–108)
CO2 SERPL-SCNC: 38 MMOL/L (ref 21–32)
CREAT SERPL-MCNC: 0.81 MG/DL (ref 0.6–1.3)
EOSINOPHIL # BLD AUTO: 0.82 THOUSAND/ÂΜL (ref 0–0.61)
EOSINOPHIL NFR BLD AUTO: 10 % (ref 0–6)
ERYTHROCYTE [DISTWIDTH] IN BLOOD BY AUTOMATED COUNT: 18.6 % (ref 11.6–15.1)
GFR SERPL CREATININE-BSD FRML MDRD: 81 ML/MIN/1.73SQ M
GLUCOSE SERPL-MCNC: 109 MG/DL (ref 65–140)
HCT VFR BLD AUTO: 34.5 % (ref 36.5–49.3)
HGB BLD-MCNC: 10.4 G/DL (ref 12–17)
IMM GRANULOCYTES # BLD AUTO: 0.04 THOUSAND/UL (ref 0–0.2)
IMM GRANULOCYTES NFR BLD AUTO: 1 % (ref 0–2)
LYMPHOCYTES # BLD AUTO: 1.2 THOUSANDS/ÂΜL (ref 0.6–4.47)
LYMPHOCYTES NFR BLD AUTO: 15 % (ref 14–44)
MAGNESIUM SERPL-MCNC: 2 MG/DL (ref 1.9–2.7)
MCH RBC QN AUTO: 30.5 PG (ref 26.8–34.3)
MCHC RBC AUTO-ENTMCNC: 30.1 G/DL (ref 31.4–37.4)
MCV RBC AUTO: 101 FL (ref 82–98)
MONOCYTES # BLD AUTO: 1.12 THOUSAND/ÂΜL (ref 0.17–1.22)
MONOCYTES NFR BLD AUTO: 14 % (ref 4–12)
NEUTROPHILS # BLD AUTO: 4.9 THOUSANDS/ÂΜL (ref 1.85–7.62)
NEUTS SEG NFR BLD AUTO: 60 % (ref 43–75)
NRBC BLD AUTO-RTO: 0 /100 WBCS
PHOSPHATE SERPL-MCNC: 2.1 MG/DL (ref 2.3–4.1)
PLATELET # BLD AUTO: 91 THOUSANDS/UL (ref 149–390)
POTASSIUM SERPL-SCNC: 3.9 MMOL/L (ref 3.5–5.3)
RBC # BLD AUTO: 3.41 MILLION/UL (ref 3.88–5.62)
SODIUM SERPL-SCNC: 139 MMOL/L (ref 135–147)
WBC # BLD AUTO: 8.1 THOUSAND/UL (ref 4.31–10.16)

## 2023-07-25 PROCEDURE — 80048 BASIC METABOLIC PNL TOTAL CA: CPT

## 2023-07-25 PROCEDURE — 85025 COMPLETE CBC W/AUTO DIFF WBC: CPT

## 2023-07-25 PROCEDURE — 99253 IP/OBS CNSLTJ NEW/EST LOW 45: CPT | Performed by: INTERNAL MEDICINE

## 2023-07-25 PROCEDURE — 83735 ASSAY OF MAGNESIUM: CPT

## 2023-07-25 PROCEDURE — NC001 PR NO CHARGE: Performed by: SURGERY

## 2023-07-25 PROCEDURE — 97166 OT EVAL MOD COMPLEX 45 MIN: CPT

## 2023-07-25 PROCEDURE — 84100 ASSAY OF PHOSPHORUS: CPT

## 2023-07-25 PROCEDURE — 97163 PT EVAL HIGH COMPLEX 45 MIN: CPT

## 2023-07-25 RX ORDER — LOSARTAN POTASSIUM 25 MG/1
25 TABLET ORAL DAILY
Status: DISCONTINUED | OUTPATIENT
Start: 2023-07-26 | End: 2023-07-26 | Stop reason: HOSPADM

## 2023-07-25 RX ADMIN — MELATONIN 6 MG: at 21:32

## 2023-07-25 RX ADMIN — UMECLIDINIUM BROMIDE AND VILANTEROL TRIFENATATE 1 PUFF: 62.5; 25 POWDER RESPIRATORY (INHALATION) at 08:16

## 2023-07-25 RX ADMIN — BRIMONIDINE TARTRATE 1 DROP: 2 SOLUTION OPHTHALMIC at 21:35

## 2023-07-25 RX ADMIN — ENOXAPARIN SODIUM 40 MG: 40 INJECTION SUBCUTANEOUS at 08:16

## 2023-07-25 RX ADMIN — PANTOPRAZOLE SODIUM 40 MG: 40 TABLET, DELAYED RELEASE ORAL at 05:08

## 2023-07-25 NOTE — ASSESSMENT & PLAN NOTE
· Known history of severe COPD and chronic respiratory failure  · He tolerated his abdominal surgery and is at his usual oxygen requirement. · Continue Incruse while in the hospital and as needed Xopenex. · Outpatient follow-up with pulmonary   · Inpatient pulmonary consult by Dr. Ronni Young noted. No need for steroids per pulmonary consult  · Pulmonary follow-up recommendations reviewed. Outpatient work-up for right middle lobe scarring/atelectasis.

## 2023-07-25 NOTE — PLAN OF CARE
Problem: OCCUPATIONAL THERAPY ADULT  Goal: Performs self-care activities at highest level of function for planned discharge setting. See evaluation for individualized goals. Description: Treatment Interventions: ADL retraining, Functional transfer training, UE strengthening/ROM, Endurance training, Cognitive reorientation, Patient/family training, Equipment evaluation/education, Compensatory technique education, Energy conservation, Activityengagement          See flowsheet documentation for full assessment, interventions and recommendations. Note: Limitation: Decreased ADL status, Decreased UE strength, Decreased cognition, Decreased Safe judgement during ADL, Decreased endurance, Decreased self-care trans, Decreased high-level ADLs  Prognosis: Good  Assessment: Pt is a 82 y.o. male seen for OT evaluation s/p admit to [facility] on 7/22/2023 w/ abdominal pain, incarcerated inguinal hernia and small bowel obstruction s/p Right - REPAIR rECURRENT INCARERATED HERNIA INGUINAL OPEN. RIGHT ORCHIECTOMY. Pt remained intubate postoperatively and extubated on 7/23. Pt on 2L O2 currently and at baseline. Comorbidities affecting pt's functional performance at time of assessment include: COPD, HTN, CKD, h/o CHF, PVD, acute on chronic respiratory failure, DM. Personal factors affecting pt at time of IE include:limited home support, difficulty performing ADLS, difficulty performing IADLS , limited insight into deficits and decreased initiation and engagement . Prior to admission, pt was living w/ spouse and reports independent w/ ADLs intermittent assist at times, independent w/ functional transfers and mobility w/ RW and assist w/ IADLs, assist transport.  Upon evaluation: Pt requires MOD assist x2 supine>Sit bed mobility w/ increased time to complete, MIN assist x2 sit<>stand w/ VCs for hand placement and positioning, MIN assist x2 functional mobility w/ RW, MOD-MAX assist LB ADLs, MIN assist UB ADLs 2* the following deficits impacting occupational performance: decreased strength and endurance, impaired balance, impaired activity tolerance, dyspnea on exertion, multiple lines, increased pain, impaired cognition. Pt to benefit from continued skilled OT tx while in the hospital to address deficits as defined above and maximize level of functional independence w ADL's and functional mobility. Occupational Performance areas to address include: grooming, bathing/shower, toilet hygiene, dressing, health maintenance, functional mobility and clothing management. From OT standpoint, recommendation at time of d/c would be short term rehab. The patient's raw score on the -PAC Daily Activity Inpatient Short Form is 16. A raw score of less than 19 suggests the patient may benefit from discharge to post-acute rehabilitation services. Please refer to the recommendation of the Occupational Therapist for safe discharge planning.      OT Discharge Recommendation: Post acute rehabilitation services

## 2023-07-25 NOTE — PLAN OF CARE
Problem: PHYSICAL THERAPY ADULT  Goal: Performs mobility at highest level of function for planned discharge setting. See evaluation for individualized goals. Description: Treatment/Interventions: Functional transfer training, LE strengthening/ROM, Elevations, Therapeutic exercise, Endurance training, Patient/family training, Bed mobility, Gait training, Spoke to nursing, OT, Spoke to case management  Equipment Recommended: Gabriel Benito (pt has)       See flowsheet documentation for full assessment, interventions and recommendations. Note: Prognosis: Fair  Problem List: Decreased strength, Decreased endurance, Impaired balance, Decreased mobility, Decreased cognition, Impaired judgement, Decreased safety awareness, Pain  Assessment: Pt. 82 y. o.male admitted for Incarcerated possibly strangulated recurrent right inguinal hernia & Small bowel obstruction due to incarcerated hernia. S/p Right - REPAIR RECURRENT INCARERATED HERNIA INGUINAL OPEN & RIGHT ORCHIECTOMY on 7/22/23. Pt intubated post-op 2* to post-op respiratory insufficiency. Extubated on 7/23/23. Pt referred to PT for mobility assessment & D/C planning w/ orders of OOB to chair. Please see above for information re: home set-up & PLOF as well as objective findings during PT assessment. Pt poor historian hence ?accuracy of information provided by pt. On eval, pt functioning below baseline hence will continue skilled PT to improve function & safety. Pt require modAx2 for bed mobility; minAx1-2 for transfers & minAx1 for amb w/ RW + cues for techniques & safety. Gait deviations as above, slow & unsteady w/ dec foot clearance & strides but no gross LOB noted. Pt require cues & assistance for RW management especially during turns. The patient's AM-PAC Basic Mobility Inpatient Short Form Raw Score is 15. A Raw score of less than or equal to 16 suggests the patient may benefit from discharge to post-acute rehabilitation services.  Please also refer to the recommendation of the Physical Therapist for safe discharge planning. From PT standpoint, due to above mentioned deficits, ? Available level of support at home & high risk for falls, pt will benefit from inpt rehab at D/C. No SOB & dizziness reported t/o session. Pt on 2L O2 during session. Noted desat to 84% immediately after amb but recovered to 94% at rest. Nsg staff most recent vital signs as follows: /80 (BP Location: Right arm)   Pulse 75   Temp 97.9 °F (36.6 °C) (Temporal)   Resp 22   Ht 5' 5" (1.651 m)   Wt 59.3 kg (130 lb 11.7 oz)   SpO2 94%   BMI 21.76 kg/m² . At end of session, pt OOB in chair in stable condition, call bell & phone in reach, chair alarm activated, all lines intact. Fall precautions reinforced w/ good understanding. CM to follow. Nsg staff to continue to mobilized pt (OOB in chair for all meals & ambulate in room/unit) as tolerated to prevent further decline in function. Nsg notified. Co-eval was necessary to complete this PT eval for the pt's best interest given pt's medical acuity & complexity. Barriers to Discharge: Decreased caregiver support  Barriers to Discharge Comments: ? level of support at home    PT Discharge Recommendation: Post acute rehabilitation services (STR pending progress & available support at home)    See flowsheet documentation for full assessment.

## 2023-07-25 NOTE — ASSESSMENT & PLAN NOTE
>>ASSESSMENT AND PLAN FOR COPD WITHOUT EXACERBATION ([address]) WRITTEN ON 7/25/2023  4:10 PM BY Wily Willis MD    Known history of severe COPD and chronic respiratory failure  He tolerated his abdominal surgery and is at his usual oxygen requirement. Continue Incruse while in the hospital and as needed Xopenex. Outpatient follow-up with pulmonary   Inpatient pulmonary consult by Dr. Anupama Arroyo noted. No need for steroids per pulmonary consult  Pulmonary follow-up recommendations reviewed. Outpatient work-up for right middle lobe scarring/atelectasis.

## 2023-07-25 NOTE — ASSESSMENT & PLAN NOTE
Wt Readings from Last 3 Encounters:   07/25/23 59.3 kg (130 lb 11.7 oz)   07/22/23 58.7 kg (129 lb 6.6 oz)   04/26/23 62.6 kg (138 lb)     Stable. Looks euvolemic. Respiratory status is at baseline.

## 2023-07-25 NOTE — ASSESSMENT & PLAN NOTE
· Recurrent right inguinal hernia with small bowel obstruction   · Status post  hernia repair and right orchiectomy on 7/22/23  · He was monitored in the ICU and transferred out yesterday.   · Management per primary service

## 2023-07-25 NOTE — ASSESSMENT & PLAN NOTE
Lab Results   Component Value Date    EGFR 81 07/25/2023    EGFR 81 07/24/2023    EGFR 67 07/23/2023    CREATININE 0.81 07/25/2023    CREATININE 0.79 07/24/2023    CREATININE 1.01 07/23/2023     Renal function stable and at baseline.

## 2023-07-25 NOTE — PROGRESS NOTES
Progress Note - General Surgery   Arley Pace 82 y.o. male MRN: [id number]  Unit/Bed#: E4 -01 Encounter: [id number]    Assessment:  82 M with PMH of COPD, CHF, CKD with recurrent RIH concern for incarceration/strangulation. Now s/p RIHR, R orchiectomy 7/22    AVSS on room air    Physical exam: Right groin swelling stable    Plan:  • Wean nasal cannula  • Regular diet  • IVF  • Monitor abdominal exam  • Plan for discharge today  • Please TigerText on call [facility] surgery resident or AP with any questions       Subjective/Objective     Subjective:   No acute events overnight. Patient refused to answer questions this morning    Objective:     Blood pressure 155/80, pulse 75, temperature 97.9 °F (36.6 °C), temperature source Temporal, resp. rate 22, height 5' 5" (1.651 m), weight 59.3 kg (130 lb 11.7 oz), SpO2 94 %. ,Body mass index is 21.76 kg/m². Intake/Output Summary (Last 24 hours) at 7/25/2023 0915  Last data filed at 7/25/2023 0831  Gross per 24 hour   Intake 300 ml   Output 250 ml   Net 50 ml       Invasive Devices     Peripheral Intravenous Line  Duration           Peripheral IV 07/22/23 Dorsal (posterior); Right Forearm 3 days    Peripheral IV 07/22/23 Left Antecubital 2 days              General: NAD  Skin: Warm, dry, anicteric  HEENT: Normocephalic, atraumatic  CV: RRR  Pulm:  Nonlabored breathing on room air  Abd: Soft, appropriately tender, nondistended; right groin swelling stable no concern for recurrent hernia  MSK: Symmetric, no edema, no tenderness, no deformity  Neuro: AOx3, GCS 15

## 2023-07-25 NOTE — OCCUPATIONAL THERAPY NOTE
Occupational Therapy Evaluation     Patient Name: Oc Rodriguez  QVLSY'I Date: 7/25/2023  Problem List  Active Problems:    Accelerated essential hypertension    Abdominal pain    Chronic diastolic CHF (congestive heart failure) (HCC)    Acute on chronic respiratory failure (HCC)    Recurrent right inguinal hernia w incarcertion    Chronic kidney disease, stage 3a ([address])    COPD without exacerbation ([address])    Past Medical History  Past Medical History:   Diagnosis Date    Acute metabolic encephalopathy 23/41/2340    Anemia     Aneurysm, aorta, thoracic (HCC)     Appendicolith     Ascending aortic aneurysm (HCC)     3.7    Asthma     BPH (benign prostatic hyperplasia)     CAD (coronary artery disease)     noted on CT scan    CHF (congestive heart failure) (HCC)     COPD (chronic obstructive pulmonary disease) ([address])     Descending thoracic aortic aneurysm ([address])     Diabetes mellitus ([address])     Diverticulosis     Former tobacco use     GERD (gastroesophageal reflux disease)     History of DVT (deep vein thrombosis)     Left leg    History of transfusion     Hypertension     Inguinal hernia     right    Nephrolithiasis     Oxygen dependent     2LNC    Oxygen dependent     Pneumonia     Pre-diabetes     Prostate calculus     PVD (peripheral vascular disease) ([facility])     Thoracic aortic aneurysm without rupture ([address]) 11/19/2018    Added automatically from request for surgery [id number]    Thrombocytopenia ([address]) 12/29/2018    Ulcer     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[address])     Varicose vein of leg     b/l     Past Surgical History  Past Surgical History:   Procedure Laterality Date    CARDIAC SURGERY      ESOPHAGOGASTRODUODENOSCOPY      HERNIA REPAIR Right 1/21/2019    Procedure: REPAIR HERNIA INGUINAL WITH MESH;  Surgeon: Nasir Pinzon MD;  Location: [facility] MAIN OR;  Service: General    HERNIA REPAIR Right 7/22/2023    Procedure: REPAIR RECURRENT INCARERATED HERNIA INGUINAL OPEN, RIGHT ORCHIECTOMY; Surgeon: Jerri Hamman, MD;  Location: [facility] Main OR;  Service: General    INGUINAL HERNIA REPAIR Bilateral     IR TEVAR  12/27/2018    MS EVASC RPR DTA COVERAGE ART ORIGIN 1ST ENDOPROSTH N/A 12/27/2018    Procedure: TEVAR - endovascular thoracic aortic aneurysm repair;  Surgeon: Fátima Davis MD;  Location: [facility] MAIN OR;  Service: Vascular    THORACIC AORTIC ANEURYSM REPAIR  12/27/2018    VARICOSE VEIN SURGERY Bilateral     vein stripping           07/25/23 0923   OT Last Visit   OT Visit Date 07/25/23   Note Type   Note type Evaluation   Pain Assessment   Pain Assessment Tool 0-10   Pain Score 4   Pain Location/Orientation Orientation: Bilateral;Location: Abdomen   Hospital Pain Intervention(s) Ambulation/increased activity;Repositioned; Emotional support   Restrictions/Precautions   Weight Bearing Precautions Per Order No   Other Precautions Chair Alarm; Bed Alarm;Cognitive; Fall Risk;Pain;Multiple lines;Telemetry;O2;Hard of hearing  (2LO2)   Home Living   Type of [address] [place] [address] Dr Two level  (bathrooms on both floors)   Bathroom Shower/Tub Tub/shower unit  (walk in shower)   [address] [facility] chair;Grab bars in [address] Walker;Cane;Wheelchair-manual   Additional Comments pt poor historian unable to hear Neurala  and PCA translating to patient and pt reports can stay on first floor if needed   Prior Function   Level of [place] Independent with ADLs; Independent with functional mobility; Needs assistance with ADLs; Needs assistance with functional mobility; Needs assistance with IADLS  (reports intermittent assist for ADLs)   Lives With Spouse   Receives Help From Family  (son visits often)   IADLs Family/Friend/Other provides meals; Family/Friend/Other provides medication management; Family/Friend/Other provides transportation   Falls in the last 6 months 0   Vocational Retired   Comments pt spouse home w/ him all the time and son nearby   Lifestyle   Autonomy per pt independent w/ ADLs w/ intermittent assist for ADLs, MOD I functional transfers and mobility w/ RW in home and w/c in community, assist w/ IADLs   Reciprocal Relationships spouse and son   Service to Others retired   Intrinsic Gratification watch tv   Subjective   Subjective "Pepe"   ADL   Where Assessed Chair   Eating Assistance 5  Supervision/Setup   Grooming Assistance 5  Supervision/Setup    [address] 4  Minimal Assistance    [address] 3  Moderate Assistance   [address] [facility] 4  [address] 3  Moderate [address] [place]  3  Moderate Assistance   Functional Assistance 3  Moderate Assistance   Bed Mobility   Supine to Sit 3  Moderate assistance   Additional items Assist x 2; Increased time required;LE management;Verbal cues;HOB elevated; Bedrails   Additional Comments increased time to complete   Transfers   Sit to Stand 4  Minimal assistance   Additional items Assist x 2; Increased time required;Verbal cues; Bedrails;Armrests   Stand to Sit 4  Minimal assistance   Additional items Assist x 1; Increased time required;Verbal cues;Armrests   Additional Comments cues for hand placement and positioning   Functional Mobility   Functional Mobility 4  Minimal assistance   Additional Comments assist x1 w/ increased time to complete, cues for safety and positioning and assist to maneuver RW   Additional items Rolling walker   Balance   Static Sitting Fair +   Dynamic Sitting Fair   Static Standing Fair -   Dynamic Standing Poor +   Ambulatory Poor   Activity Tolerance   Activity Tolerance Patient limited by pain; Patient limited by fatigue;Treatment limited secondary to medical complications (Comment)   Medical Staff Made Aware PT alessia: Pt seen for co-session with skilled Physical therapist 2* clinically unstable presentation, medical complexity, new precautions, performance deficits/functional limitations, impaired cognition/safety awareness, limited activity tolerance and present impairments which are a regression from patient patient's baseline and impacting overall occupational performance   Nurse Made Aware appropriate to see per Jhony PAYAN Assessment   RUE Assessment WFL  (4-/5)   LUE Assessment   LUE Assessment WFL  (4-/5)   Hand Function   Gross Motor Coordination Functional   Fine Motor Coordination Functional   Sensation   Light Touch No apparent deficits   Proprioception   Proprioception No apparent deficits   Vision-Basic Assessment   Current Vision Wears glasses all the time   Vision - Complex Assessment   Ocular Range of Motion Intact   Acuity Able to read clock/calendar on wall without difficulty   Perception   Inattention/Neglect Appears intact   Cognition   Overall Cognitive Status Impaired   Arousal/Participation Responsive; Cooperative   Attention Attends with cues to redirect   Orientation Level Oriented to person;Oriented to place; Disoriented to time;Disoriented to situation   Memory Decreased recall of precautions;Decreased recall of recent events;Decreased short term memory   Following Commands Follows one step commands with increased time or repetition   Comments pt poor historian, increased processing time, [place] citizen speaking only and use of  for translation   Assessment   Limitation Decreased ADL status; Decreased UE strength;Decreased cognition;Decreased Safe judgement during ADL;Decreased endurance;Decreased self-care trans;Decreased high-level ADLs   Prognosis Good   Assessment Pt is a 82 y.o. male seen for OT evaluation s/p admit to [facility] on 7/22/2023 w/ abdominal pain, incarcerated inguinal hernia and small bowel obstruction s/p Right - REPAIR rECURRENT INCARERATED HERNIA INGUINAL OPEN. RIGHT ORCHIECTOMY. Pt remained intubate postoperatively and extubated on 7/23. Pt on 2L O2 currently and at baseline.  Comorbidities affecting pt's functional performance at time of assessment include: COPD, HTN, CKD, h/o CHF, PVD, acute on chronic respiratory failure, DM. Personal factors affecting pt at time of IE include:limited home support, difficulty performing ADLS, difficulty performing IADLS , limited insight into deficits and decreased initiation and engagement . Prior to admission, pt was living w/ spouse and reports independent w/ ADLs intermittent assist at times, independent w/ functional transfers and mobility w/ RW and assist w/ IADLs, assist transport. Upon evaluation: Pt requires MOD assist x2 supine>Sit bed mobility w/ increased time to complete, MIN assist x2 sit<>stand w/ VCs for hand placement and positioning, MIN assist x2 functional mobility w/ RW, MOD-MAX assist LB ADLs, MIN assist UB ADLs 2* the following deficits impacting occupational performance: decreased strength and endurance, impaired balance, impaired activity tolerance, dyspnea on exertion, multiple lines, increased pain, impaired cognition. Pt to benefit from continued skilled OT tx while in the hospital to address deficits as defined above and maximize level of functional independence w ADL's and functional mobility. Occupational Performance areas to address include: grooming, bathing/shower, toilet hygiene, dressing, health maintenance, functional mobility and clothing management. From OT standpoint, recommendation at time of d/c would be short term rehab. The patient's raw score on the -PAC Daily Activity Inpatient Short Form is 16. A raw score of less than 19 suggests the patient may benefit from discharge to post-acute rehabilitation services. Please refer to the recommendation of the Occupational Therapist for safe discharge planning. Goals   Patient Goals none expressed 2* cognition   LTG Time Frame 10-14   Long Term Goal please see below goals   Plan   Treatment Interventions ADL retraining;Functional transfer training;UE strengthening/ROM; Endurance training;Cognitive reorientation;Patient/family training;Equipment evaluation/education; Compensatory technique education; Energy conservation; Activityengagement   Goal Expiration Date 08/08/23   OT Frequency 3-5x/wk   Recommendation   OT Discharge Recommendation Post acute rehabilitation services   AM-PAC Daily Activity Inpatient   Lower Body Dressing 2   Bathing 2   Toileting 2   Upper Body Dressing 3   Grooming 3   Eating 4   Daily Activity Raw Score 16   Daily Activity Standardized Score (Calc for Raw Score >=11) 35.96   AM-PAC Applied Cognition Inpatient   Following a Speech/Presentation 2   Understanding Ordinary Conversation 3   Taking Medications 2   Remembering Where Things Are Placed or Put Away 2   Remembering List of 4-5 Errands 2   Taking Care of Complicated Tasks 1   Applied Cognition Raw Score 12   Applied Cognition Standardized Score 28.82   Modified [place] Scale   Modified [place] Scale 4   End of Consult   Education Provided Yes   Patient Position at End of Consult Bedside chair;Bed/Chair alarm activated; All needs within reach   Nurse Communication Nurse aware of consult     Occupational Therapy Goals to be met in 10-14 days:  1) Pt will improve activity tolerance to G for 30 min txment sessions to enhance ADLs  2) Pt will complete ADLs/self care w/ supervision w/ LHAE prn  3) Pt will complete toileting w/ supervision w/ G hygiene/thoroughness using DME PRN  4) Pt will improve functional transfers on/off all surfaces using DME PRN w/ G balance/safety including toileting w/ supervision  5) Pt will improve fx'l mobility during I/ADl/leisure tasks using DME PRN w/ g balance/safety w/ supervision  6) Pt will engage in ongoing cognitive assessment w/ G participation to A w/ safe d/c planning/recommendations  7) Pt will demonstrate G carryover of pt/caregiver education and training as appropriate w/ mod I  w/ G tolerance  8) Pt will engage in depression screen/leisure interest checklist w/ G participation to monitor s/s depression and ID 3 positive coping strategies to A w/ emotional regulation and management  9) Pt will demonstrate 100% carryover of E.C. techniques w/ mod I t/o fx'l I/ADL/leisure tasks w/o cues s/p skilled education  10) Pt will demonstrate improved bed mobility to supervision to enhance ADLs  11) Pt will engage in activity configuration activity w/ G participation and mod I to increase time management skills and improve participation in a structured routine to improve overall quality of life  12) Pt will demonstrate 100% carryover of LHAE for LB ADLs/self care and leisure s/p skilled education w/ mod I and G participation  13) Pt will demonstrate improved standing tolerance to 3-5 minutes during functional tasks w/ good - dynamic standing balance to enhance ADL performance  14) Pt will demonstrate improved b/l UE strength by 1 MMT grade to enhance ADLS and functional transfers   Documentation completed by: Marcos Mirza, MS, OTR/L

## 2023-07-25 NOTE — PLAN OF CARE
Problem: Prexisting or High Potential for Compromised Skin Integrity  Goal: Skin integrity is maintained or improved  Description: INTERVENTIONS:  - Identify patients at risk for skin breakdown  - Assess and monitor skin integrity  - Assess and monitor nutrition and hydration status  - Monitor labs   - Assess for incontinence   - Turn and reposition patient  - Assist with mobility/ambulation  - Relieve pressure over bony prominences  - Avoid friction and shearing  - Provide appropriate hygiene as needed including keeping skin clean and dry  - Evaluate need for skin moisturizer/barrier cream  - Collaborate with interdisciplinary team   - Patient/family teaching  - Consider wound care consult   Outcome: Progressing     Problem: MOBILITY - ADULT  Goal: Maintain or return to baseline ADL function  Description: INTERVENTIONS:  -  Assess patient's ability to carry out ADLs; assess patient's baseline for ADL function and identify physical deficits which impact ability to perform ADLs (bathing, care of mouth/teeth, toileting, grooming, dressing, etc.)  - Assess/evaluate cause of self-care deficits   - Assess range of motion  - Assess patient's mobility; develop plan if impaired  - Assess patient's need for assistive devices and provide as appropriate  - Encourage maximum independence but intervene and supervise when necessary  - Involve family in performance of ADLs  - Assess for home care needs following discharge   - Consider OT consult to assist with ADL evaluation and planning for discharge  - Provide patient education as appropriate  Outcome: Progressing  Goal: Maintains/Returns to pre admission functional level  Description: INTERVENTIONS:  - Perform BMAT or MOVE assessment daily.   - Set and communicate daily mobility goal to care team and patient/family/caregiver. - Collaborate with rehabilitation services on mobility goals if consulted  - Perform Range of Motion  times a day.   - Reposition patient every hours.  - Dangle patient  times a day  - Stand patient  times a day  - Ambulate patient  times a day  - Out of bed to chair  times a day   - Out of bed for meals  times a day  - Out of bed for toileting  - Record patient progress and toleration of activity level   Outcome: Progressing     Problem: PAIN - ADULT  Goal: Verbalizes/displays adequate comfort level or baseline comfort level  Description: Interventions:  - Encourage patient to monitor pain and request assistance  - Assess pain using appropriate pain scale  - Administer analgesics based on type and severity of pain and evaluate response  - Implement non-pharmacological measures as appropriate and evaluate response  - Consider cultural and social influences on pain and pain management  - Notify physician/advanced practitioner if interventions unsuccessful or patient reports new pain  Outcome: Progressing     Problem: INFECTION - ADULT  Goal: Absence or prevention of progression during hospitalization  Description: INTERVENTIONS:  - Assess and monitor for signs and symptoms of infection  - Monitor lab/diagnostic results  - Monitor all insertion sites, i.e. indwelling lines, tubes, and drains  - Monitor endotracheal if appropriate and nasal secretions for changes in amount and color  - [place] appropriate cooling/warming therapies per order  - Administer medications as ordered  - Instruct and encourage patient and family to use good hand hygiene technique  - Identify and instruct in appropriate isolation precautions for identified infection/condition  Outcome: Progressing     Problem: SAFETY ADULT  Goal: Maintain or return to baseline ADL function  Description: INTERVENTIONS:  -  Assess patient's ability to carry out ADLs; assess patient's baseline for ADL function and identify physical deficits which impact ability to perform ADLs (bathing, care of mouth/teeth, toileting, grooming, dressing, etc.)  - Assess/evaluate cause of self-care deficits   - Assess range of motion  - Assess patient's mobility; develop plan if impaired  - Assess patient's need for assistive devices and provide as appropriate  - Encourage maximum independence but intervene and supervise when necessary  - Involve family in performance of ADLs  - Assess for home care needs following discharge   - Consider OT consult to assist with ADL evaluation and planning for discharge  - Provide patient education as appropriate  Outcome: Progressing  Goal: Maintains/Returns to pre admission functional level  Description: INTERVENTIONS:  - Perform BMAT or MOVE assessment daily.   - Set and communicate daily mobility goal to care team and patient/family/caregiver. - Collaborate with rehabilitation services on mobility goals if consulted  - Perform Range of Motion  times a day. - Reposition patient every  hours.   - Dangle patient  times a day  - Stand patient  times a day  - Ambulate patient  times a day  - Out of bed to chair  times a day   - Out of bed for meals  times a day  - Out of bed for toileting  - Record patient progress and toleration of activity level   Outcome: Progressing  Goal: Patient will remain free of falls  Description: INTERVENTIONS:  - Educate patient/family on patient safety including physical limitations  - Instruct patient to call for assistance with activity   - Consult OT/PT to assist with strengthening/mobility   - Keep Call bell within reach  - Keep bed low and locked with side rails adjusted as appropriate  - Keep care items and personal belongings within reach  - Initiate and maintain comfort rounds  - Make Fall Risk Sign visible to staff  - Offer Toileting every  Hours, in advance of need  - Initiate/Maintain alarm  - Obtain necessary fall risk management equipment  - Apply yellow socks and bracelet for high fall risk patients  - Consider moving patient to room near nurses station  Outcome: Progressing     Problem: DISCHARGE PLANNING  Goal: Discharge to home or other facility with appropriate resources  Description: INTERVENTIONS:  - Identify barriers to discharge w/patient and caregiver  - Arrange for needed discharge resources and transportation as appropriate  - Identify discharge learning needs (meds, wound care, etc.)  - Arrange for interpretive services to assist at discharge as needed  - Refer to Case Management Department for coordinating discharge planning if the patient needs post-hospital services based on physician/advanced practitioner order or complex needs related to functional status, cognitive ability, or social support system  Outcome: Progressing     Problem: Knowledge Deficit  Goal: Patient/family/caregiver demonstrates understanding of disease process, treatment plan, medications, and discharge instructions  Description: Complete learning assessment and assess knowledge base.   Interventions:  - Provide teaching at level of understanding  - Provide teaching via preferred learning methods  Outcome: Progressing

## 2023-07-25 NOTE — CONSULTS
[facility]  Consult  Name: Art Trevizo 82 y.o. male I MRN: [id number]  Unit/Bed#: E4 -01 I Date of Admission: 7/22/2023   Date of Service: 7/25/2023 I Hospital Day: 3    Consults    Assessment/Plan   * Recurrent right inguinal hernia w incarcertion  Assessment & Plan  · Recurrent right inguinal hernia with small bowel obstruction   · Status post  hernia repair and right orchiectomy on 7/22/23  · He was monitored in the ICU and transferred out yesterday. · Management per primary service      COPD without exacerbation ([address])  Assessment & Plan  · Known history of severe COPD and chronic respiratory failure  · He tolerated his abdominal surgery and is at his usual oxygen requirement. · Continue Incruse while in the hospital and as needed Xopenex. · Outpatient follow-up with pulmonary   · Inpatient pulmonary consult by Dr. Siomara Barnes noted. No need for steroids per pulmonary consult  · Pulmonary follow-up recommendations reviewed. Outpatient work-up for right middle lobe scarring/atelectasis. Chronic respiratory failure with hypoxia (HCC)  Assessment & Plan  · Known history of severe COPD and chronic respiratory failure  · Was briefly intubated postoperatively and successfully extubated   · On baseline oxygen requirement, 2 L  · Continue respiratory toilet and incentive spirometry as tolerated    Chronic kidney disease, stage 3a [facility])  Assessment & Plan  Lab Results   Component Value Date    EGFR 81 07/25/2023    EGFR 81 07/24/2023    EGFR 67 07/23/2023    CREATININE 0.81 07/25/2023    CREATININE 0.79 07/24/2023    CREATININE 1.01 07/23/2023     Renal function stable and at baseline. Chronic diastolic CHF (congestive heart failure) (HCC)  Assessment & Plan  Wt Readings from Last 3 Encounters:   07/25/23 59.3 kg (130 lb 11.7 oz)   07/22/23 58.7 kg (129 lb 6.6 oz)   04/26/23 62.6 kg (138 lb)     Stable. Looks euvolemic. Respiratory status is at baseline.       Benign essential hypertension  Assessment & Plan  Resume Cozaar 25 mg daily         VTE Prophylaxis:   Lovenox    Recommendations for Discharge:  · Short-term rehab per PT    History of Present Illness:  Art Trevizo is a 82 y.o. male who is originally admitted to the surgery service due to incarcerated right inguinal hernia. He underwent urgent hernia repair and right orchiectomy byDr. Talya Maddox on 7/22/2023. He remained in the ICU postoperatively and was in the ventilator. He was successfully weaned off the ventilator on 7/24/2023 and transferred out. Since he was transferred from the ICU to [id number] [place] [address], we are asked to see him in consultation for medical management of his COPD, CKD and chronic diastolic heart failure    Review of Systems:  Review of Systems   Constitutional: Negative for chills and fever. HENT: Negative. Eyes: Negative. Respiratory: Negative for shortness of breath. Cardiovascular: Negative for chest pain. Gastrointestinal: Negative. Musculoskeletal: Negative. Neurological: Negative. Psychiatric/Behavioral: Negative. All other systems reviewed and are negative.       Past Medical and Surgical History:   Past Medical History:   Diagnosis Date   • Acute metabolic encephalopathy 41/02/5311   • Anemia    • Aneurysm, aorta, thoracic (HCC)    • Appendicolith    • Ascending aortic aneurysm (HCC)     3.7   • Asthma    • BPH (benign prostatic hyperplasia)    • CAD (coronary artery disease)     noted on CT scan   • CHF (congestive heart failure) (HCC)    • COPD (chronic obstructive pulmonary disease) (HCC)    • Descending thoracic aortic aneurysm (HCC)    • Diabetes mellitus ([address])    • Diverticulosis    • Former tobacco use    • GERD (gastroesophageal reflux disease)    • History of DVT (deep vein thrombosis)     Left leg   • History of transfusion    • Hypertension    • Inguinal hernia     right   • Nephrolithiasis    • Oxygen dependent     2LNC   • Oxygen dependent    • Pneumonia • Pre-diabetes    • Prostate calculus    • PVD (peripheral vascular disease) ([address])    • Thoracic aortic aneurysm without rupture ([address]) 11/19/2018    Added automatically from request for surgery [id number]   • Thrombocytopenia ([address]) 12/29/2018   • Ulcer    • 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[address])    • Varicose vein of leg     b/l       Past Surgical History:   Procedure Laterality Date   • CARDIAC SURGERY     • ESOPHAGOGASTRODUODENOSCOPY     • HERNIA REPAIR Right 1/21/2019    Procedure: REPAIR HERNIA INGUINAL WITH MESH;  Surgeon: Belinda Liu MD;  [facility] MAIN OR;  Service: General   • HERNIA REPAIR Right 7/22/2023    Procedure: REPAIR RECURRENT INCARERATED HERNIA INGUINAL OPEN, RIGHT ORCHIECTOMY;  Surgeon: Ronald Bueno MD;  Location: [facility] Main OR;  Service: General   • INGUINAL HERNIA REPAIR Bilateral    • IR TEVAR  12/27/2018   • NM EVASC RPR DTA COVERAGE ART ORIGIN 1ST ENDOPROSTH N/A 12/27/2018    Procedure: TEVAR - endovascular thoracic aortic aneurysm repair;  Surgeon: David Chew MD;  Location:  MAIN OR;  Service: Vascular   • THORACIC AORTIC ANEURYSM REPAIR  12/27/2018   • VARICOSE VEIN SURGERY Bilateral     vein stripping       Meds/Allergies:  PTA meds:   Prior to Admission Medications   Prescriptions Last Dose Informant Patient Reported? Taking?    albuterol (PROVENTIL HFA,VENTOLIN HFA) 90 mcg/act inhaler   Yes No   Sig: Inhale 2 puffs every 6 (six) hours as needed   brimonidine tartrate 0.2 % ophthalmic solution   Yes No   hydroxypropyl methylcellulose (GONAK) 2.5 % ophthalmic solution   Yes No   Sig: Apply 1 drop to eye Three times a day   ketorolac (ACULAR) 0.5 % ophthalmic solution   No No   Sig: ADMINISTER 1 DROP TO BOTH EYES 4 (FOUR) TIMES A DAY   losartan (COZAAR) 25 mg tablet   No No   Sig: Take 1 tablet (25 mg total) by mouth daily   ofloxacin (OCUFLOX) 0.3 % ophthalmic solution   No No   Sig: Apply 1 drop to eye 4 (four) times a day   pantoprazole (PROTONIX) 40 mg tablet   No No   Sig: TAKE 1 TABLET (40 MG TOTAL) BY MOUTH DAILY   predniSONE 5 mg tablet   No No   Sig: TAKE ONE TABLET BY MOUTH ONCE DAILY   prednisoLONE acetate (PRED FORTE) 1 % ophthalmic suspension   No No   Sig: APPLY 1 DROP TO EYE 3 (THREE) TIMES A DAY   tiotropium-olodaterol (Stiolto Respimat) 2.5-2.5 MCG/ACT inhaler   No No   Sig: Inhale 2 puffs daily      Facility-Administered Medications: None       Allergies: Allergies   Allergen Reactions   • Penicillins Hives, Itching and Rash   • Lisinopril Rash     Side pains and rash    • Zyprexa [Olanzapine] Tongue Swelling       Social History:  Marital Status: /Civil Union  Substance Use History:   Social History     Substance and Sexual Activity   Alcohol Use Never     Social History     Tobacco Use   Smoking Status Former   • Packs/day: 1.00   • Years: 35.00   • Total pack years: 35.00   • Types: Cigarettes   • Start date:    • Quit date:    • Years since quittin.5   Smokeless Tobacco Never     Social History     Substance and Sexual Activity   Drug Use No       Family History:  Family History   Problem Relation Age of Onset   • Tuberculosis Mother    • No Known Problems Father    • Cancer Sister    • Diabetes Family    • Hypertension Family        Physical Exam:   Vitals:   Blood Pressure: 155/80 (23 0815)  Pulse: 75 (23 0815)  Temperature: 97.9 °F (36.6 °C) (23 0815)  Temp Source: Temporal (23 0815)  Respirations: 22 (23 0815)  Height: 5' 5" (165.1 cm) (23 1348)  Weight - Scale: 59.3 kg (130 lb 11.7 oz) (23 0534)  SpO2: 94 % (23 0815)    Physical Exam  Vitals reviewed. Constitutional:       Appearance: He is not ill-appearing. HENT:      Head: Normocephalic and atraumatic. Nose: No congestion or rhinorrhea. Eyes:      General: No scleral icterus. Cardiovascular:      Rate and Rhythm: Regular rhythm. Pulmonary:      Breath sounds: No wheezing or rhonchi.       Comments: Prominent clavicles  Abdominal:      Palpations: Abdomen is soft. Musculoskeletal:      Cervical back: Neck supple. Right lower leg: No edema. Left lower leg: No edema. Comments: Diminished muscle mass   Psychiatric:         Behavior: Behavior normal.     Additional Data:   Lab Results:    Results from last 7 days   Lab Units 07/25/23  0444   WBC Thousand/uL 8.10   HEMOGLOBIN g/dL 10.4*   HEMATOCRIT % 34.5*   PLATELETS Thousands/uL 91*   NEUTROS PCT % 60   LYMPHS PCT % 15   MONOS PCT % 14*   EOS PCT % 10*     Results from last 7 days   Lab Units 07/25/23  0731 07/23/23  0529 07/22/23  0324   SODIUM mmol/L 139   < > 142   POTASSIUM mmol/L 3.9   < > 4.3   CHLORIDE mmol/L 100   < > 95*   CO2 mmol/L 38*   < > 43*   BUN mg/dL 15   < > 16   CREATININE mg/dL 0.81   < > 0.96   ANION GAP mmol/L 1   < > 4   CALCIUM mg/dL 8.5   < > 10.1   ALBUMIN g/dL  --   --  4.2   TOTAL BILIRUBIN mg/dL  --   --  0.81   ALK PHOS U/L  --   --  69   ALT U/L  --   --  11   AST U/L  --   --  20   GLUCOSE RANDOM mg/dL 109   < > 109    < > = values in this interval not displayed. Results from last 7 days   Lab Units 07/22/23  0600   INR  0.98         Lab Results   Component Value Date/Time    HGBA1C 6.2 12/09/2022 10:56 AM    HGBA1C 6.2 (H) 06/13/2020 10:21 AM    HGBA1C 6.3 (H) 06/12/2020 04:32 AM    HGBA1C 7.1 (H) 12/24/2018 11:43 AM               Imaging: Reviewed radiology reports from this admission including: chest xray  No orders to display       EKG, Pathology, and Other Studies Reviewed on Admission:   ·  Echo 6/90/9203-GYRE normal systolic function and grade 1 diastolic dysfunction     ** Please Note: This note may have been constructed using a voice recognition system.  **

## 2023-07-25 NOTE — ASSESSMENT & PLAN NOTE
· Known history of severe COPD and chronic respiratory failure  · Was briefly intubated postoperatively and successfully extubated   · On baseline oxygen requirement, 2 L  · Continue respiratory toilet and incentive spirometry as tolerated

## 2023-07-25 NOTE — PROGRESS NOTES
Progress Note - General Surgery   Alex Mendez 82 y.o. male MRN: [id number]  Unit/Bed#: E4 -01 Encounter: [id number]    Assessment:  82 M with PMH of COPD, CHF, CKD with recurrent RIH concern for incarceration/strangulation. Now s/p RIHR, R orchiectomy 7/22    Plan:  • Diet as tolerated  • Wean O2  • D/c when placement available  • Please TigerText on call [facility] surgery resident or AP with any questions       Subjective/Objective     Subjective:   No acute events overnight. With flatus. Tolerating diet    Objective:     Blood pressure 159/92, pulse 80, temperature 98.2 °F (36.8 °C), temperature source Temporal, resp. rate 20, height 5' 5" (1.651 m), weight 59.3 kg (130 lb 11.7 oz), SpO2 94 %. ,Body mass index is 21.76 kg/m². Intake/Output Summary (Last 24 hours) at 7/26/2023 0604  Last data filed at 7/25/2023 1652  Gross per 24 hour   Intake 960 ml   Output 700 ml   Net 260 ml       Invasive Devices     Peripheral Intravenous Line  Duration           Peripheral IV 07/22/23 Dorsal (posterior); Right Forearm 4 days    Peripheral IV 07/22/23 Left Antecubital 3 days                Physical Exam:   Gen:  NAD. HEENT: NCAT. MMM. CV: well perfused, pulses palpable  Lungs: Normal respiratory effort  Abd: soft, nt/nd, incisions cdi  Skin: warm/ dry  Extremities: no peripheral edema, no cyanosis  Neuro:  AxO x3

## 2023-07-25 NOTE — PHYSICAL THERAPY NOTE
PT EVALUATION    Pt. Name: Joe Flanagan  Pt. Age: 82 y.o.   MRN: [id number]  LENGTH OF STAY: 3      Admitting Diagnoses:   SBO (small bowel obstruction) ([address]) [A19.848]    Past Medical History:   Diagnosis Date    Acute metabolic encephalopathy 98/49/2553    Anemia     Aneurysm, aorta, thoracic (HCC)     Appendicolith     Ascending aortic aneurysm (HCC)     3.7    Asthma     BPH (benign prostatic hyperplasia)     CAD (coronary artery disease)     noted on CT scan    CHF (congestive heart failure) (HCC)     COPD (chronic obstructive pulmonary disease) ([address])     Descending thoracic aortic aneurysm ([address])     Diabetes mellitus ([address])     Diverticulosis     Former tobacco use     GERD (gastroesophageal reflux disease)     History of DVT (deep vein thrombosis)     Left leg    History of transfusion     Hypertension     Inguinal hernia     right    Nephrolithiasis     Oxygen dependent     2LNC    Oxygen dependent     Pneumonia     Pre-diabetes     Prostate calculus     PVD (peripheral vascular disease) ([address])     Thoracic aortic aneurysm without rupture ([address]) 11/19/2018    Added automatically from request for surgery [id number]    Thrombocytopenia ([address]) 12/29/2018    Ulcer     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[address])     Varicose vein of leg     b/l       Past Surgical History:   Procedure Laterality Date    CARDIAC SURGERY      ESOPHAGOGASTRODUODENOSCOPY      HERNIA REPAIR Right 1/21/2019    Procedure: REPAIR HERNIA INGUINAL WITH MESH;  Surgeon: Nabil Gomes MD;  Location: [facility] MAIN OR;  Service: General    HERNIA REPAIR Right 7/22/2023    Procedure: REPAIR RECURRENT INCARERATED HERNIA INGUINAL OPEN, RIGHT ORCHIECTOMY;  Surgeon: Doreen Stafford MD;  Location: [facility] Main OR;  Service: General    INGUINAL HERNIA REPAIR Bilateral     IR TEVAR  12/27/2018    PA EVASC RPR DTA COVERAGE ART ORIGIN 1ST ENDOPROSTH N/A 12/27/2018    Procedure: TEVAR - endovascular thoracic aortic aneurysm repair;  Surgeon: Guzman Ortega MD;  Location: [facility] MA[facility];  Service: Vascular    THORACIC AORTIC ANEURYSM REPAIR  12/27/2018    VARICOSE VEIN SURGERY Bilateral     vein stripping       Imaging Studies:  No orders to display        07/25/23 0924   PT Last Visit   PT Visit Date 07/25/23   Note Type   Note type Evaluation   Pain Assessment   Pain Score 4   Pain Location/Orientation Orientation: Bilateral;Location: Abdomen   Hospital Pain Intervention(s) Repositioned; Ambulation/increased activity; Emotional support; Rest   Restrictions/Precautions   Weight Bearing Precautions Per Order No   Other Precautions Cognitive; Chair Alarm; Bed Alarm;Multiple lines;Telemetry;O2;Fall Risk;Hard of hearing  (2L O2 NC)   Home Living   Type of Home House   Home Layout Two level;Bed/bath upstairs; Other (Comment)  (full bath on 1st & 2nd flr)   Bathroom Shower/Tub Tub/shower unit  (has both tub/shower & wa;l-in shower)   Bathroom Toilet Standard   Bathroom Equipment Shower chair;Grab bars in shower   Home Equipment Walker;Cane;Wheelchair-manual   Additional Comments Pt poor historian hence ?accuracy of above information provided by pt. Prior Function   Level of [place] Independent with functional mobility; Independent with ADLs; Other (Comment)  (pt reports sometimes his wife helps him complete his ADL's; pt also reports that he ambulates w/ RW for short distances & uses w/c for longer distances)   Lives With Spouse   Receives Help From Family  (local son visits often)   Falls in the last 6 months 0  (pt denies)   Vocational Retired   Comments Pt poor historian hence ?accuracy of above information provided by pt. Per chart, pt on home O2 2L at baseline. General   Family/Caregiver Present No   Cognition   Overall Cognitive Status Impaired   Arousal/Participation Alert   Attention Attends with cues to redirect   Orientation Level Oriented to person;Oriented to place; Disoriented to time;Disoriented to situation  (oriented to place after several cues) Following Commands Follows one step commands with increased time or repetition   Comments pt Stateless speaking only; pt unable to use [employer]  2* to [facility]; PCA able to provide translation   Subjective   Subjective Pt agreeable to PT/OT marcela. RUE Assessment   RUE Assessment   (refer to OT)   LUE Assessment   LUE Assessment   (refer to OT)   RLE Assessment   RLE Assessment WFL  (difficult to assess MMT 2* to language barrier & [place]; appears 4-/5 grossly)   LLE Assessment   LLE Assessment WFL  (difficult to assess MMT 2* to language barrier & [place]; appears 4-/5 grossly)   Bed Mobility   Supine to Sit 3  Moderate assistance   Additional items Assist x 2;HOB elevated; Bedrails; Increased time required;Verbal cues;LE management   Additional Comments cues for techniques & safety   Transfers   Sit to Stand 4  Minimal assistance   Additional items Assist x 2; Increased time required;Verbal cues   Stand to Sit 4  Minimal assistance   Additional items Assist x 1; Increased time required;Verbal cues   Additional Comments cues for techniques & safety   Ambulation/Elevation   Gait pattern Forward Flexion; Wide RICHARD; Decreased foot clearance;Shuffling; Short stride; Excessively slow   Gait Assistance 4  Minimal assist   Additional items Assist x 1;Verbal cues; Tactile cues   Assistive Device Rolling walker   Distance 10'x1   Ambulation/Elevation Additional Comments unsteady gait but no gross LOB noted; require cues & assistance for RW mgt especially during turns   Balance   Static Sitting Fair +   Dynamic Sitting Fair   Static Standing Fair -   Dynamic Standing Poor +   Ambulatory Poor +   Activity Tolerance   Activity Tolerance Patient limited by fatigue;Patient limited by pain;Treatment limited secondary to medical complications (Comment)   Medical Staff Made Aware CUAUHTEMOC Zamorano   Nurse Made Aware RN [facility]   Assessment   Prognosis Fair   Problem List Decreased strength;Decreased endurance; Impaired balance;Decreased mobility; Decreased cognition; Impaired judgement;Decreased safety awareness;Pain   Assessment Pt. 82 y. o.male admitted for Incarcerated possibly strangulated recurrent right inguinal hernia & Small bowel obstruction due to incarcerated hernia. S/p Right - REPAIR RECURRENT INCARERATED HERNIA INGUINAL OPEN & RIGHT ORCHIECTOMY on 7/22/23. Pt intubated post-op 2* to post-op respiratory insufficiency. Extubated on 7/23/23. Pt referred to PT for mobility assessment & D/C planning w/ orders of OOB to chair. Please see above for information re: home set-up & PLOF as well as objective findings during PT assessment. Pt poor historian hence ?accuracy of information provided by pt. On eval, pt functioning below baseline hence will continue skilled PT to improve function & safety. Pt require modAx2 for bed mobility; minAx1-2 for transfers & minAx1 for amb w/ RW + cues for techniques & safety. Gait deviations as above, slow & unsteady w/ dec foot clearance & strides but no gross LOB noted. Pt require cues & assistance for RW management especially during turns. The patient's AM-PAC Basic Mobility Inpatient Short Form Raw Score is 15. A Raw score of less than or equal to 16 suggests the patient may benefit from discharge to post-acute rehabilitation services. Please also refer to the recommendation of the Physical Therapist for safe discharge planning. From PT standpoint, due to above mentioned deficits, ? Available level of support at home & high risk for falls, pt will benefit from inpt rehab at D/C. Pt on 2L O2 during session. Noted desat to 84% immediately after amb but recovered to 94% at rest. No SOB & dizziness reported t/o session. Nsg staff most recent vital signs as follows: /80 (BP Location: Right arm)   Pulse 75   Temp 97.9 °F (36.6 °C) (Temporal)   Resp 22   Ht 5' 5" (1.651 m)   Wt 59.3 kg (130 lb 11.7 oz)   SpO2 94%   BMI 21.76 kg/m² .  At end of session, pt OOB in chair in stable condition, call bell & phone in reach, chair alarm activated, all lines intact. Fall precautions reinforced w/ good understanding. CM to follow. Nsg staff to continue to mobilized pt (OOB in chair for all meals & ambulate in room/unit) as tolerated to prevent further decline in function. Nsg notified. Co-eval was necessary to complete this PT eval for the pt's best interest given pt's medical acuity & complexity. Barriers to Discharge Decreased caregiver support   Barriers to Discharge Comments ? level of support at home   Goals   Patient Goals none stated 2* to impaired cognition & language barrier   STG Expiration Date 08/08/23   Short Term Goal #1 Goals to be met in 14 days; pt will be able to: 1) inc strength & balance by 1/2 grade to improve overall functional mobility & dec fall risk; 2) inc bed mobility to S for pt to be able to get in/OOB safely w/ proper techniques 100% of the time, to dec caregiver burden & safely function at home; 3) inc transfers to S for pt to transition safely from one surface to another w/o % of the time, to dec caregiver burden & safely function at home; 4) inc amb w/ RW approx. >80' w/ S for pt to ambulate household distances w/o any % of the time, to dec caregiver burden & safely function at home; 5) negotiate stairs w/ S for inc safety during stair mgt inside/outside of home & dec caregiver burden; 6) pt/caregiver ed   PT Treatment Day 0   Plan   Treatment/Interventions Functional transfer training;LE strengthening/ROM; Elevations; Therapeutic exercise; Endurance training;Patient/family training;Bed mobility;Gait training;Spoke to nursing;OT;Spoke to case management   PT Frequency 3-5x/wk   Recommendation   PT Discharge Recommendation Post acute rehabilitation services  (STR pending progress & available support at home)   Equipment Recommended Tejas Ness  (pt has)   AM-PAC Basic Mobility Inpatient   Turning in Flat Bed Without Bedrails 3   Lying on Back to Sitting on Edge of Flat Bed Without Bedrails 2   Moving Bed to Chair 3   Standing Up From Chair Using Arms 2   Walk in Room 3   Climb 3-5 Stairs With Railing 2   Basic Mobility Inpatient Raw Score 15   Basic Mobility Standardized Score 36.97   Highest Level Of Mobility   -[facility] Goal 4: Move to chair/commode   -HL Achieved 6: Walk 10 steps or more   End of Consult   Patient Position at End of Consult Bedside chair;Bed/Chair alarm activated; All needs within reach   End of Consult Comments Pt in stable condition. All needs in reach. Chair alarm activated.    Hx/personal factors: co-morbidities, inaccessible home, advanced age, telemetry, use of AD, dec cognition, pain, fall risk, assist w/ ADL's, and O2  Examination: dec mobility, dec balance, dec endurance, dec amb, risk for falls, pain, dec cognition  Clinical: unpredictable (ongoing medical status, abnormal lab values, risk for falls, and pain mgt)  Complexity: high    [employer]

## 2023-07-25 NOTE — PLAN OF CARE
Problem: Prexisting or High Potential for Compromised Skin Integrity  Goal: Skin integrity is maintained or improved  Description: INTERVENTIONS:  - Identify patients at risk for skin breakdown  - Assess and monitor skin integrity  - Assess and monitor nutrition and hydration status  - Monitor labs   - Assess for incontinence   - Turn and reposition patient  - Assist with mobility/ambulation  - Relieve pressure over bony prominences  - Avoid friction and shearing  - Provide appropriate hygiene as needed including keeping skin clean and dry  - Evaluate need for skin moisturizer/barrier cream  - Collaborate with interdisciplinary team   - Patient/family teaching  - Consider wound care consult   Outcome: Progressing     Problem: MOBILITY - ADULT  Goal: Maintain or return to baseline ADL function  Description: INTERVENTIONS:  -  Assess patient's ability to carry out ADLs; assess patient's baseline for ADL function and identify physical deficits which impact ability to perform ADLs (bathing, care of mouth/teeth, toileting, grooming, dressing, etc.)  - Assess/evaluate cause of self-care deficits   - Assess range of motion  - Assess patient's mobility; develop plan if impaired  - Assess patient's need for assistive devices and provide as appropriate  - Encourage maximum independence but intervene and supervise when necessary  - Involve family in performance of ADLs  - Assess for home care needs following discharge   - Consider OT consult to assist with ADL evaluation and planning for discharge  - Provide patient education as appropriate  Outcome: Progressing  Goal: Maintains/Returns to pre admission functional level  Description: INTERVENTIONS:  - Perform BMAT or MOVE assessment daily.   - Set and communicate daily mobility goal to care team and patient/family/caregiver. - Collaborate with rehabilitation services on mobility goals if consulted  - Perform Range of Motion 3 times a day.   - Reposition patient every 2 hours.  - Dangle patient 3 times a day  - Stand patient 3 times a day  - Ambulate patient 3 times a day  - Out of bed to chair 3 times a day   - Out of bed for meals 3 times a day  - Out of bed for toileting  - Record patient progress and toleration of activity level   Outcome: Progressing     Problem: PAIN - ADULT  Goal: Verbalizes/displays adequate comfort level or baseline comfort level  Description: Interventions:  - Encourage patient to monitor pain and request assistance  - Assess pain using appropriate pain scale  - Administer analgesics based on type and severity of pain and evaluate response  - Implement non-pharmacological measures as appropriate and evaluate response  - Consider cultural and social influences on pain and pain management  - Notify physician/advanced practitioner if interventions unsuccessful or patient reports new pain  Outcome: Progressing     Problem: INFECTION - ADULT  Goal: Absence or prevention of progression during hospitalization  Description: INTERVENTIONS:  - Assess and monitor for signs and symptoms of infection  - Monitor lab/diagnostic results  - Monitor all insertion sites, i.e. indwelling lines, tubes, and drains  - Monitor endotracheal if appropriate and nasal secretions for changes in amount and color  - [place] appropriate cooling/warming therapies per order  - Administer medications as ordered  - Instruct and encourage patient and family to use good hand hygiene technique  - Identify and instruct in appropriate isolation precautions for identified infection/condition  Outcome: Progressing     Problem: SAFETY ADULT  Goal: Maintain or return to baseline ADL function  Description: INTERVENTIONS:  -  Assess patient's ability to carry out ADLs; assess patient's baseline for ADL function and identify physical deficits which impact ability to perform ADLs (bathing, care of mouth/teeth, toileting, grooming, dressing, etc.)  - Assess/evaluate cause of self-care deficits   - Assess range of motion  - Assess patient's mobility; develop plan if impaired  - Assess patient's need for assistive devices and provide as appropriate  - Encourage maximum independence but intervene and supervise when necessary  - Involve family in performance of ADLs  - Assess for home care needs following discharge   - Consider OT consult to assist with ADL evaluation and planning for discharge  - Provide patient education as appropriate  Outcome: Progressing  Goal: Maintains/Returns to pre admission functional level  Description: INTERVENTIONS:  - Perform BMAT or MOVE assessment daily.   - Set and communicate daily mobility goal to care team and patient/family/caregiver. - Collaborate with rehabilitation services on mobility goals if consulted  - Perform Range of Motion 3 times a day. - Reposition patient every 2 hours.   - Dangle patient 3 times a day  - Stand patient 3 times a day  - Ambulate patient 3 times a day  - Out of bed to chair 3 times a day   - Out of bed for meals 3 times a day  - Out of bed for toileting  - Record patient progress and toleration of activity level   Outcome: Progressing  Goal: Patient will remain free of falls  Description: INTERVENTIONS:  - Educate patient/family on patient safety including physical limitations  - Instruct patient to call for assistance with activity   - Consult OT/PT to assist with strengthening/mobility   - Keep Call bell within reach  - Keep bed low and locked with side rails adjusted as appropriate  - Keep care items and personal belongings within reach  - Initiate and maintain comfort rounds  - Make Fall Risk Sign visible to staff  - Offer Toileting every 2 Hours, in advance of need  - Initiate/Maintain bed alarm  - Obtain necessary fall risk management equipment  - Apply yellow socks and bracelet for high fall risk patients  - Consider moving patient to room near nurses station  Outcome: Progressing     Problem: DISCHARGE PLANNING  Goal: Discharge to home or other facility with appropriate resources  Description: INTERVENTIONS:  - Identify barriers to discharge w/patient and caregiver  - Arrange for needed discharge resources and transportation as appropriate  - Identify discharge learning needs (meds, wound care, etc.)  - Arrange for interpretive services to assist at discharge as needed  - Refer to Case Management Department for coordinating discharge planning if the patient needs post-hospital services based on physician/advanced practitioner order or complex needs related to functional status, cognitive ability, or social support system  Outcome: Progressing     Problem: Knowledge Deficit  Goal: Patient/family/caregiver demonstrates understanding of disease process, treatment plan, medications, and discharge instructions  Description: Complete learning assessment and assess knowledge base.   Interventions:  - Provide teaching at level of understanding  - Provide teaching via preferred learning methods  Outcome: Progressing

## 2023-07-26 ENCOUNTER — HOME HEALTH ADMISSION (OUTPATIENT)
Dept: HOME HEALTH SERVICES | Facility: HOME HEALTHCARE | Age: 88
End: 2023-07-26

## 2023-07-26 VITALS
WEIGHT: 128.75 LBS | HEIGHT: 65 IN | SYSTOLIC BLOOD PRESSURE: 163 MMHG | HEART RATE: 77 BPM | TEMPERATURE: 98.2 F | OXYGEN SATURATION: 95 % | RESPIRATION RATE: 20 BRPM | BODY MASS INDEX: 21.45 KG/M2 | DIASTOLIC BLOOD PRESSURE: 91 MMHG

## 2023-07-26 PROCEDURE — 88302 TISSUE EXAM BY PATHOLOGIST: CPT | Performed by: PATHOLOGY

## 2023-07-26 PROCEDURE — NC001 PR NO CHARGE: Performed by: SURGERY

## 2023-07-26 PROCEDURE — 88305 TISSUE EXAM BY PATHOLOGIST: CPT | Performed by: PATHOLOGY

## 2023-07-26 RX ADMIN — UMECLIDINIUM BROMIDE AND VILANTEROL TRIFENATATE 1 PUFF: 62.5; 25 POWDER RESPIRATORY (INHALATION) at 08:31

## 2023-07-26 RX ADMIN — LOSARTAN POTASSIUM 25 MG: 25 TABLET, FILM COATED ORAL at 08:31

## 2023-07-26 RX ADMIN — PANTOPRAZOLE SODIUM 40 MG: 40 TABLET, DELAYED RELEASE ORAL at 06:33

## 2023-07-26 RX ADMIN — ENOXAPARIN SODIUM 40 MG: 40 INJECTION SUBCUTANEOUS at 08:31

## 2023-07-26 RX ADMIN — BRIMONIDINE TARTRATE 1 DROP: 2 SOLUTION OPHTHALMIC at 08:31

## 2023-07-26 NOTE — CASE MANAGEMENT
Case Management Discharge Planning Note    Patient name Yamila Jose  Location [place] [id number] [facility] [place] [address] [address] [facility] [place] [address]-* MRN [id number]  : 1935 Date 2023       Current Admission Date: 2023  Current Admission Diagnosis:Recurrent right inguinal hernia w incarcertion   Patient Active Problem List    Diagnosis Date Noted   • COPD without exacerbation ([address]) 2023   • Hypoxemia 2023   • Hyperkalemia 2023   • Encounter for support and coordination of transition of care 2023   • Vitamin B12 deficiency 2023   • Vitamin D deficiency 2023   • Hypernatremia 2023   • Chronic kidney disease, stage 3a ([address]) 2023   • Age-related cataract of right eye 2023   • Recurrent right inguinal hernia w incarcertion    • Small bowel obstruction ([address]) 2022   • Unspecified severe protein-calorie malnutrition ([address]) 2022   • Acute on chronic respiratory failure ([address]) 2022   • Supplemental oxygen dependent 2022   • Hyperlipidemia 2021   • 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   • Dysphagia 2020   • TIA (transient ischemic attack) 2020   • S/P hernia repair 2019   • Acquired renal cyst of left kidney 2019   • Abdominal pain 2019   • Chronic diastolic CHF (congestive heart failure) ([address]) 2019   • Varicose veins of both lower extremities with pain 2018   • 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   • Chronic respiratory failure with hypoxia ([address]) 2018   • Accelerated essential hypertension 2018   • Chronic obstructive pulmonary disease with acute exacerbation ([address]) 2018   • Descending aortic aneurysm ([address]) 2018   • History of DVT (deep vein thrombosis) 2018   • Benign essential hypertension 2017      LOS (days): 4  Geometric Mean LOS (GMLOS) (days): 5.10  Days to GMLOS:0.9     OBJECTIVE:  Risk of Unplanned Readmission Score: 21.67         Current admission status: Inpatient   Preferred Pharmacy:   [address] [facility], [address] [facility] [address] [facility]  [address] [facility] [address]  Phone: [phone] Fax: [address], [address] [facility] [address] [address]  Phone: [phone] Fax: [phone]    Primary Care Provider: Emmanuel Ness MD    Primary Insurance: Verdia Na MEDICARE [facility]  Secondary Insurance: [facility]    DISCHARGE DETAILS:    Discharge planning discussed with[de-identified] Pt, spouse & son  Freedom of Choice: Yes  Comments - Freedom of Choice: Pt would like to go home w/ [facility]  CM contacted family/caregiver?: Yes  Were Treatment Team discharge recommendations reviewed with patient/caregiver?: Yes  Did patient/caregiver verbalize understanding of patient care needs?: Yes  Were patient/caregiver advised of the risks associated with not following Treatment Team discharge recommendations?: Yes    Contacts  Patient Contacts: Marnie Biggs (son) & spouse  Relationship to Patient[de-identified] Family  Contact Method:  In Person  Reason/Outcome: Continuity of Care, Emergency Contact, Discharge [address] [employer]         Is the patient interested in [address] at discharge?: Yes  [facility] requested[de-identified] Physical Therapy, [address] Name[de-identified] [place] Provider[de-identified] Referring Provider  Home Health Services Needed[de-identified] Evaluate Functional Status and Safety, Gait/ADL Training, Strengthening/Theraputic Exercises to Improve Function  Homebound Criteria Met[de-identified] Requires the Assistance of Another Person for Safe Ambulation or to Leave the Home, Uses an Assist Device (i.e. cane, walker, etc)  Supporting Clincal Findings[de-identified] Fatigues Easliy in [employer], Limited Endurance                   Treatment Team Recommendation: Short Term Rehab  Discharge Destination Plan[de-identified] Home with [address]                                         Additional Comments: CM met w/ pt & family at bedside to discuss d/c planning. Although recommendations is STR family would like HH preferrably [facility]. CM sent referral & [facility] is accepting. Attending let CM know that pt is medically cleared for d/c today. Family confirmed they can take pt home. Family denied having any questions or concerns at this time. CM to continue to follow pt care & d/c.

## 2023-07-26 NOTE — PLAN OF CARE
Problem: Prexisting or High Potential for Compromised Skin Integrity  Goal: Skin integrity is maintained or improved  Description: INTERVENTIONS:  - Identify patients at risk for skin breakdown  - Assess and monitor skin integrity  - Assess and monitor nutrition and hydration status  - Monitor labs   - Assess for incontinence   - Turn and reposition patient  - Assist with mobility/ambulation  - Relieve pressure over bony prominences  - Avoid friction and shearing  - Provide appropriate hygiene as needed including keeping skin clean and dry  - Evaluate need for skin moisturizer/barrier cream  - Collaborate with interdisciplinary team   - Patient/family teaching  - Consider wound care consult   Outcome: Progressing     Problem: MOBILITY - ADULT  Goal: Maintain or return to baseline ADL function  Description: INTERVENTIONS:  -  Assess patient's ability to carry out ADLs; assess patient's baseline for ADL function and identify physical deficits which impact ability to perform ADLs (bathing, care of mouth/teeth, toileting, grooming, dressing, etc.)  - Assess/evaluate cause of self-care deficits   - Assess range of motion  - Assess patient's mobility; develop plan if impaired  - Assess patient's need for assistive devices and provide as appropriate  - Encourage maximum independence but intervene and supervise when necessary  - Involve family in performance of ADLs  - Assess for home care needs following discharge   - Consider OT consult to assist with ADL evaluation and planning for discharge  - Provide patient education as appropriate  Outcome: Progressing  Goal: Maintains/Returns to pre admission functional level  Description: INTERVENTIONS:  - Perform BMAT or MOVE assessment daily.   - Set and communicate daily mobility goal to care team and patient/family/caregiver. - Collaborate with rehabilitation services on mobility goals if consulted  - Perform Range of Motion  times a day.   - Reposition patient every hours.  - Dangle patient  times a day  - Stand patient  times a day  - Ambulate patient  times a day  - Out of bed to chair  times a day   - Out of bed for meals  times a day  - Out of bed for toileting  - Record patient progress and toleration of activity level   Outcome: Progressing     Problem: PAIN - ADULT  Goal: Verbalizes/displays adequate comfort level or baseline comfort level  Description: Interventions:  - Encourage patient to monitor pain and request assistance  - Assess pain using appropriate pain scale  - Administer analgesics based on type and severity of pain and evaluate response  - Implement non-pharmacological measures as appropriate and evaluate response  - Consider cultural and social influences on pain and pain management  - Notify physician/advanced practitioner if interventions unsuccessful or patient reports new pain  Outcome: Progressing     Problem: INFECTION - ADULT  Goal: Absence or prevention of progression during hospitalization  Description: INTERVENTIONS:  - Assess and monitor for signs and symptoms of infection  - Monitor lab/diagnostic results  - Monitor all insertion sites, i.e. indwelling lines, tubes, and drains  - Monitor endotracheal if appropriate and nasal secretions for changes in amount and color  - [place] appropriate cooling/warming therapies per order  - Administer medications as ordered  - Instruct and encourage patient and family to use good hand hygiene technique  - Identify and instruct in appropriate isolation precautions for identified infection/condition  Outcome: Progressing     Problem: SAFETY ADULT  Goal: Maintain or return to baseline ADL function  Description: INTERVENTIONS:  -  Assess patient's ability to carry out ADLs; assess patient's baseline for ADL function and identify physical deficits which impact ability to perform ADLs (bathing, care of mouth/teeth, toileting, grooming, dressing, etc.)  - Assess/evaluate cause of self-care deficits   - Assess range of motion  - Assess patient's mobility; develop plan if impaired  - Assess patient's need for assistive devices and provide as appropriate  - Encourage maximum independence but intervene and supervise when necessary  - Involve family in performance of ADLs  - Assess for home care needs following discharge   - Consider OT consult to assist with ADL evaluation and planning for discharge  - Provide patient education as appropriate  Outcome: Progressing  Goal: Maintains/Returns to pre admission functional level  Description: INTERVENTIONS:  - Perform BMAT or MOVE assessment daily.   - Set and communicate daily mobility goal to care team and patient/family/caregiver. - Collaborate with rehabilitation services on mobility goals if consulted  - Perform Range of Motion  times a day. - Reposition patient every  hours.   - Dangle patient  times a day  - Stand patient  times a day  - Ambulate patient  times a day  - Out of bed to chair  times a day   - Out of bed for meals  times a day  - Out of bed for toileting  - Record patient progress and toleration of activity level   Outcome: Progressing  Goal: Patient will remain free of falls  Description: INTERVENTIONS:  - Educate patient/family on patient safety including physical limitations  - Instruct patient to call for assistance with activity   - Consult OT/PT to assist with strengthening/mobility   - Keep Call bell within reach  - Keep bed low and locked with side rails adjusted as appropriate  - Keep care items and personal belongings within reach  - Initiate and maintain comfort rounds  - Make Fall Risk Sign visible to staff  - Offer Toileting every  Hours, in advance of need  - Initiate/Maintain alarm  - Obtain necessary fall risk management equipment  - Apply yellow socks and bracelet for high fall risk patients  - Consider moving patient to room near nurses station  Outcome: Progressing     Problem: DISCHARGE PLANNING  Goal: Discharge to home or other facility with appropriate resources  Description: INTERVENTIONS:  - Identify barriers to discharge w/patient and caregiver  - Arrange for needed discharge resources and transportation as appropriate  - Identify discharge learning needs (meds, wound care, etc.)  - Arrange for interpretive services to assist at discharge as needed  - Refer to Case Management Department for coordinating discharge planning if the patient needs post-hospital services based on physician/advanced practitioner order or complex needs related to functional status, cognitive ability, or social support system  Outcome: Progressing     Problem: Knowledge Deficit  Goal: Patient/family/caregiver demonstrates understanding of disease process, treatment plan, medications, and discharge instructions  Description: Complete learning assessment and assess knowledge base.   Interventions:  - Provide teaching at level of understanding  - Provide teaching via preferred learning methods  Outcome: Progressing

## 2023-07-26 NOTE — NURSING NOTE
Pt discharged via wheelchair. AVS discussed with family and patient, belongings sent, IV's removed. No questions or concerns at time of discharge.

## 2023-07-26 NOTE — RESTORATIVE TECHNICIAN NOTE
Restorative Technician Note      Patient Name: Alanis French     Restorative Tech Visit Date: 07/26/23  Note Type: Mobility  Patient Position Upon Consult: Supine  Activity Performed: Ambulated  Assistive Device: Roller walker  Patient Position at End of Consult:  Other (comment) (Wheelchair)

## 2023-07-26 NOTE — DISCHARGE INSTR - AVS FIRST PAGE
[facility] Surgical  Post-Operative Care Instructions  Dr. Cheree Galeazzi MD, Norbert [facility]oudley  [phone]    1. General: You will feel pulling sensations around the wound or funny aches and pains around the incisions. This is normal. Even minor surgery is a change in your body and this is your body’s way of reaction to it. If you have had abdominal surgery, it may help to support the incision with a small pillow or blanket for comfort when moving or coughing. 2. Wound care:  Okay to shower. The glue will fall off over the next week or 2.    3. Water: You may shower over the wound, unless there are drain tubes left in place. Do not bathe or use a pool or hot tub until cleared by the physician. 4. Activity: You may go up and down stairs, walk as much as you are comfortable, but walk at least 3 times each day. If you have had abdominal surgery, do not lift anything heavier than 15 pounds for at least 2-4 weeks, unless cleared by the doctor. 5. Diet: You may resume a regular diet. If you had a same-day surgery or overnight stay surgery, he may wish to eat lightly for a few days: soups, crackers, and sandwiches. You may resume a regular diet when ready. 6. Medications: Resume all of your previous medications, unless told otherwise by the doctor. Avoid aspirin or ibuprofen (Advil, Motrin, etc.) products for 2-3 days after the date of surgery. You may, at that time, began to take them again. Tylenol is always fine, unless you are taking any narcotic pain medication containing Tylenol (such as Percocet, Darvocet, Vicodin, or anything containing acetaminophen). Do not take Tylenol if you're taking these medications. You do not need to take the narcotic pain medications unless you are having significant pain and discomfort. 7. Driving: You will need someone to drive you home on the day of surgery.  Do not drive or make any important decisions while on narcotic pain medication or 24 hours and after anesthesia or sedation for surgery. Generally, you may drive when your off all narcotic pain medications. 8. Upset Stomach: You may take Maalox, Tums, or similar items for an upset stomach. If your narcotic pain medication causes an upset stomach, do not take it on an empty stomach. Try taking it with at least some crackers or toast.     9. Constipation: Patients often experienced constipation after surgery. You may take over-the-counter medication for this, such as Metamucil, Senokot, Dulcolax, milk of magnesia, etc. You may take a suppository unless you have had anorectal surgery such as a procedure on your hemorrhoids. If you experience significant nausea or vomiting after abdominal surgery, call the office before trying any of these medications. 10. Call the office: If you are experiencing any of the following, fevers above 101.5°, significant nausea or vomiting, if the wound develops drainage and/or is excessive redness around the wound, or if you have significant diarrhea or other worsening symptoms. 11. Pain: You may be given a prescription for pain. This will be given to the hospital, the day of surgery. 12. Sexual Activity: You may resume sexual activity when you feel ready and comfortable and your incision is sealed and healed without apparent infection risk. 13. Urination: If you haven't urinated in 6 hours, go directly to the ER for evaluation for urinary retention. 14. Follow-up in 2 weeks.

## 2023-07-27 ENCOUNTER — TRANSITIONAL CARE MANAGEMENT (OUTPATIENT)
Dept: FAMILY MEDICINE CLINIC | Facility: CLINIC | Age: 88
End: 2023-07-27

## 2023-07-27 DIAGNOSIS — Z71.89 COMPLEX CARE COORDINATION: Primary | ICD-10-CM

## 2023-07-27 NOTE — UTILIZATION REVIEW
NOTIFICATION OF ADMISSION DISCHARGE   This is a Notification of Discharge from [facility]. Please be advised that this patient has been discharge from our facility. Below you will find the admission and discharge date and time including the patient’s disposition. UTILIZATION REVIEW CONTACT:  Muna Burt  Utilization   Network Utilization Review Department  Phone: [phone] x carefully listen to the prompts. All voicemails are confidential.  Email: [email or web address]     ADMISSION INFORMATION  PRESENTATION DATE: 7/22/2023  7:51 AM  OBERVATION ADMISSION DATE:   INPATIENT ADMISSION DATE: 7/22/23  7:51 AM   DISCHARGE DATE: 7/26/2023  1:35 PM   DISPOSITION:Home with Home Health Care    IMPORTANT INFORMATION:  Send all requests for admission clinical reviews, approved or denied determinations and any other requests to dedicated fax number below belonging to the campus where the patient is receiving treatment.  List of dedicated fax numbers:  [place] DENIALS (Administrative/Medical Necessity) [phone] [address] [facility] (Maternity/NICU/Pediatrics) [phone]   [facility] [phone]   [facility] [phone] [address] [phone]   [address] [place] [address] [phone]   [facility] [phone]   [address] [facility] [phone]   [address] [facility] [phone] [address] [facility] [phone] [address] [facility] [address] [facility] [phone]

## 2023-07-28 ENCOUNTER — HOME CARE VISIT (OUTPATIENT)
Dept: HOME HEALTH SERVICES | Facility: HOME HEALTHCARE | Age: 88
End: 2023-07-28

## 2023-07-28 ENCOUNTER — TELEMEDICINE (OUTPATIENT)
Dept: FAMILY MEDICINE CLINIC | Facility: CLINIC | Age: 88
End: 2023-07-28
Payer: MEDICARE

## 2023-07-28 ENCOUNTER — PATIENT OUTREACH (OUTPATIENT)
Dept: FAMILY MEDICINE CLINIC | Facility: CLINIC | Age: 88
End: 2023-07-28

## 2023-07-28 DIAGNOSIS — J96.11 CHRONIC RESPIRATORY FAILURE WITH HYPOXIA (HCC): ICD-10-CM

## 2023-07-28 DIAGNOSIS — R10.84 GENERALIZED ABDOMINAL PAIN: Primary | ICD-10-CM

## 2023-07-28 PROCEDURE — 99495 TRANSJ CARE MGMT MOD F2F 14D: CPT | Performed by: FAMILY MEDICINE

## 2023-07-28 RX ORDER — OXYCODONE HYDROCHLORIDE AND ACETAMINOPHEN 5; 325 MG/1; MG/1
1 TABLET ORAL EVERY 8 HOURS PRN
Qty: 15 TABLET | Refills: 0 | Status: SHIPPED | OUTPATIENT
Start: 2023-07-28 | End: 2023-08-02

## 2023-07-28 RX ORDER — PREDNISONE 5 MG/1
5 TABLET ORAL DAILY
Qty: 30 TABLET | Refills: 0 | Status: SHIPPED | OUTPATIENT
Start: 2023-07-28

## 2023-07-28 RX ORDER — PANTOPRAZOLE SODIUM 20 MG/1
20 TABLET, DELAYED RELEASE ORAL
Qty: 30 TABLET | Refills: 5 | Status: SHIPPED | OUTPATIENT
Start: 2023-07-28 | End: 2024-01-24

## 2023-07-28 NOTE — CASE COMMUNICATION
This is for informational purposes only. Patient's son is agreeable to a home PT [facility] visit on Monday. New SOC date will be 7/31/23.  Thank you

## 2023-07-28 NOTE — PROGRESS NOTES
Virtual TCM Visit:    Verification of patient location:    Patient is located at Home in the following state in which I hold an active license [place]    Assessment/Plan:   Continue home care. Placed orders for the needs. Problem List Items Addressed This Visit     Abdominal pain - Primary    Relevant Medications    pantoprazole (PROTONIX) 20 mg tablet    Chronic respiratory failure with hypoxia (HCC)    Relevant Medications    predniSONE 5 mg tablet        Reason for visit is Encounter for support and coordination of transition of care Z76.89    It was my intent to perform this visit via video technology but the patient was not able to do a video connection so the visit was completed via audio telephone only. All conversation was through his son Aida Verma. Encounter provider Abi Velazquez MD     Provider located at [facility] [place] [address] [address] [phone]    Recent Visits  No visits were found meeting these conditions. Showing recent visits within past 7 days and meeting all other requirements  Future Appointments  No visits were found meeting these conditions. Showing future appointments within next 150 days and meeting all other requirements       After connecting through [employer], the patient was identified by name and date of birth. Julia Locke was informed that this is a telemedicine visit and that the visit is being conducted through Telephone. My office door was closed. No one else was in the room. He acknowledged consent and understanding of privacy and security of the video platform. The patient has agreed to participate and understands they can discontinue the visit at any time. Patient is aware this is a billable service. Transitional Care Management Review:  Julia Locke is a 82 y.o. male here for TCM follow up.      During the TCM phone call patient stated:    TCM Call     Date and time call was made  7/27/2023  9:05 AM    Hospital care reviewed  Records reviewed    Patient was hospitialized at  [facility]    Date of Admission  07/22/23    Date of discharge  07/26/23    Diagnosis  Chronic obstructive pulmonary disease with acute exacerbation    Disposition  Home    Current Symptoms  None      TCM Call     Post hospital issues  None    Should patient be enrolled in anticoag monitoring? No    Scheduled for follow up? Yes    Patients specialists  [facility]    Did you obtain your prescribed medications  Yes    Do you need help managing your prescriptions or medications  No    Is transportation to your appointment needed  No    I have advised the patient to call PCP with any new or worsening symptoms  Murali Osorio MA    Living Arrangements  Family members    Have you fallen in the last 12 months  No    Interperter language line needed  No        Subjective:     Patient ID: Sakina Coleman is a 82 y.o. male. Improved xsx after discharge from Hospital. On 24 hrs Oxygen. Eating well. Medication conciliation with his son. Requesting home care needs: diapers etc.    Review of Systems      Objective: There were no vitals filed for this visit. Physical Exam    Medications have been reviewed by provider in current encounter    I spent 15 minutes with the patient during this visit. Radha Brower MD      VIRTUAL VISIT DISCLAIMER    Sakina Coleman verbally agrees to participate in [facility]. Pt is aware that [facility] could be limited without vital signs or the ability to perform a full hands-on physical Diannejohn Galan understands he or the provider may request at any time to terminate the video visit and request the patient to seek care or treatment in person.

## 2023-07-29 NOTE — DISCHARGE SUMMARY
Discharge Summary - General Surgery   [facility] 82 y.o. male MRN: [id number]  Unit/Bed#: E4 -01 Encounter: [id number]    Admission Date: 7/22/2023     Discharge Date: 7/26/2023     Admitting Diagnosis: SBO (small bowel obstruction) ([address]) [K56.609]    Discharge Diagnosis: Principal Problem:    Recurrent right inguinal hernia w incarcertion  Active Problems:    Benign essential hypertension    Chronic respiratory failure with hypoxia (HCC)    Chronic diastolic CHF (congestive heart failure) (HCC)    Chronic kidney disease, stage 3a (HCC)    COPD without exacerbation ([address])  Resolved Problems:    * No resolved hospital problems. *      Attending and Service:   Dominick Yañez MD; General Surgery    Consulting Physician(s):   Medicine    Procedures Performed:   Procedure(s) (LRB):  REPAIR RECURRENT INCARERATED HERNIA INGUINAL OPEN, RIGHT ORCHIECTOMY (Right)      Imaging:  No results found. Hospital Course:   HPI, per Dominick Yañez MD: "[facility] is a 82 y.o. male who presents to the emergency department at [facility] with abdominal pain. Patient is [place]-speaking only and the official  line was used. The time of exam patient is poorly responsive at the time of exam but the wife states this is more due to tiredness. He has history of a right inguinal repair in the distant past.  In 2019 he had a recurrent right inguinal hernia repair with preperitoneal mesh and onlay mesh. He was seen and admitted in January at [facility] for incarcerated hernia but that spontaneously reduced. Surgery was deferred at that time due to his medical comorbidities. He is on home oxygen"    The patient was taken to the operating room on 7/22/23 for performance of the above-stated procedures. Intraoperative findings included:   Extensive scarring and fibrosis. Incarcerated recurrent inguinal hernia with bowel causing small bowel obstruction. Bowel was viable and reduced. Multiple other piece of mesh in the right groin. Due to the dense adhesions the spermatic cord was densely adherent medially to the pubic tubercle and the old mesh and unable to be  and therefore decision was made to perform a right orchiectomy. This also allowed better closure of the floor of the canal and a better repair. Patient did well postop. Was kept a few extra days for PT assessment recommended rehab but patient elected to undergo outpatient physical therapy. Patient was discharged on HD#4. On the day of discharge, the patient was voiding spontaneously, ambulating at baseline, and pain was well controlled. The patient was sent home with medication for pain. He understood all instructions for discharge. He was also given the names and numbers of the providers as well as instructions for follow up appointments. Condition at Discharge: good     Discharge instructions/Information to patient and family:   See after visit summary for information provided to patient and family. Provisions for Follow-Up Care:  See after visit summary for information related to follow-up care and any pertinent home health orders. Disposition: See After Visit Summary for discharge disposition information. Planned Readmission: No    Discharge Statement   I spent 30 minutes discharging the patient. This time was spent on the day of discharge. I had direct contact with the patient on the day of discharge. Additional documentation is required if more than 30 minutes were spent on discharge. Discharge Medications:  See after visit summary for reconciled discharge medications provided to patient and family.       Merle Chin MD  7/29/2023  1:02 PM

## 2023-07-31 ENCOUNTER — HOME CARE VISIT (OUTPATIENT)
Dept: HOME HEALTH SERVICES | Facility: HOME HEALTHCARE | Age: 88
End: 2023-07-31

## 2023-07-31 NOTE — CASE COMMUNICATION
PT made phone call to pts son Tricia Martinez to schedule PT [facility] for today. Tricia Martinez states they are taking pt out for a walk in the park today and PT inquired about homebound status. Tricia Martinez reports his dad is doing well around the house and they were looking to take him to outpatient. Pt will not be admitted to [address] [place] [address] for HHPT. Please place referral for pt to be evaluated in outpatient PT. Thank you.

## 2023-08-03 NOTE — PROGRESS NOTES
Assessment/Plan:   [place] is a 82 y. o.male who comes in today for postoperative check after [address], RIGHT ORCHIECTOMY (Right) 7/22/23 with Dr. Tawanna Lind. Patient is pleased with surgical results. Pathology: Reviewed with patient, all questions answered. Final Diagnosis   A. Hernia Sac, Right Inguinal:  - Fibroadipose and connective tissue consistent with hernia sac. - Negative for malignancy.      B. Testis, Right:  - Testis with no specific histopathologic abnormality.  - Negative for malignancy       Postoperative restrictions reviewed, including specific lifting and exercise restrictions. All questions answered. ___________________________________________________  HPI:  [place] is a 82 y. o.male who comes in today for postoperative check after recent [address], RIGHT ORCHIECTOMY (Right) 7/22/23 with Dr. Tawanna Lind. Currently doing well without problems, no fever or chills,no nausea and no vomiting. Denies chest pain and troubles breathing. Reports minimal pain, he is having a hard time psychologically feeling like he is only 1/2 a man with only one testicle. Hospital Course:   HPI, per Jeremy Gowers, MD: "Michael Mcqueen a 82 y. o. male who presents to the emergency department at [facility] with abdominal pain.  Patient is Kiswahili-speaking only and the official  line was used.  The time of exam patient is poorly responsive at the time of exam but the wife states this is more due to tiredness.  He has history of a right inguinal repair in the distant past.  In 2019 he had a recurrent right inguinal hernia repair with preperitoneal mesh and onlay mesh.  He was seen and admitted in January at [facility] for incarcerated hernia but that spontaneously reduced.  Surgery was deferred at that time due to his medical comorbidities.  He is on home oxygen"     The patient was taken to the operating room on 7/22/23 for performance of the above-stated procedures. Intraoperative findings included:   Extensive scarring and fibrosis.  Incarcerated recurrent inguinal hernia with bowel causing small bowel obstruction.  Bowel was viable and reduced.  Multiple other piece of mesh in the right groin.  Due to the dense adhesions the spermatic cord was densely adherent medially to the pubic tubercle and the old mesh and unable to be  and therefore decision was made to perform a right orchiectomy.  This also allowed better closure of the floor of the canal and a better repair.     Patient did well postop. Was kept a few extra days for PT assessment recommended rehab but patient elected to undergo outpatient physical therapy.     Patient was discharged on HD#4. On the day of discharge, the patient was voiding spontaneously, ambulating at baseline, and pain was well controlled. The patient was sent home with medication for pain. He understood all instructions for discharge. He was also given the names and numbers of the providers as well as instructions for follow up appointments. ROS:  General ROS: negative for - chills, fatigue, fever or night sweats, weight loss  Respiratory ROS: no cough, shortness of breath, or wheezing  Cardiovascular ROS: no chest pain or dyspnea on exertion  Genito-Urinary ROS: no dysuria, trouble voiding, or hematuria  Musculoskeletal ROS: negative for - gait disturbance, joint pain or muscle pain  Neurological ROS: no TIA or stroke symptoms    GI ROS: see HPI  Skin ROS: no new rashes or lesions   Lymphatic ROS: no new adenopathy noted by pt.    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[facility])   • Abdominal pain   • Chronic diastolic CHF (congestive heart failure) ([facility])   • Acquired renal cyst of left kidney   • TIA (transient ischemic attack)   • Dysphagia   • 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)   • Hyperlipidemia   • Acute on chronic respiratory failure ([facility])   • Unspecified severe protein-calorie malnutrition ([facility])   • S/P hernia repair   • Supplemental oxygen dependent   • Small bowel obstruction ([facility])   • Recurrent right inguinal hernia w incarcertion   • Chronic kidney disease, stage 3a ([facility])   • Age-related cataract of right eye   • Hypernatremia   • Encounter for support and coordination of transition of care   • Vitamin B12 deficiency   • Vitamin D deficiency   • Hypoxemia   • Hyperkalemia   • COPD without exacerbation ([facility])         Allergies:   Penicillins, Lisinopril, and Zyprexa [olanzapine]      Current Outpatient Medications:   •  albuterol (PROVENTIL HFA,VENTOLIN HFA) 90 mcg/act inhaler, Inhale 2 puffs every 6 (six) hours as needed, Disp: , Rfl:   •  brimonidine tartrate 0.2 % ophthalmic solution, , Disp: , Rfl:   •  hydroxypropyl methylcellulose (GONAK) 2.5 % ophthalmic solution, Apply 1 drop to eye Three times a day, Disp: , Rfl:   •  ketorolac (ACULAR) 0.5 % ophthalmic solution, ADMINISTER 1 DROP TO BOTH EYES 4 (FOUR) TIMES A DAY, Disp: 5 mL, Rfl: 5  •  losartan (COZAAR) 25 mg tablet, Take 1 tablet (25 mg total) by mouth daily, Disp: 90 tablet, Rfl: 3  •  ofloxacin (OCUFLOX) 0.3 % ophthalmic solution, Apply 1 drop to eye 4 (four) times a day, Disp: 5 mL, Rfl: 0  •  pantoprazole (PROTONIX) 20 mg tablet, Take 1 tablet (20 mg total) by mouth daily before breakfast, Disp: 30 tablet, Rfl: 5  •  prednisoLONE acetate (PRED FORTE) 1 % ophthalmic suspension, APPLY 1 DROP TO EYE 3 (THREE) TIMES A DAY, Disp: 5 mL, Rfl: 3  •  predniSONE 5 mg tablet, Take 1 tablet (5 mg total) by mouth daily, Disp: 30 tablet, Rfl: 0  •  tiotropium-olodaterol (Stiolto Respimat) 2.5-2.5 MCG/ACT inhaler, Inhale 2 puffs daily, Disp: 4 g, Rfl: 5    Past Medical History:   Diagnosis Date   • Acute metabolic encephalopathy 69/23/4503   • Anemia    • Aneurysm, aorta, thoracic (HCC)    • Appendicolith    • Ascending aortic aneurysm (HCC)     3.7   • Asthma    • BPH (benign prostatic hyperplasia)    • CAD (coronary artery disease)     noted on CT scan   • CHF (congestive heart failure) (HCC)    • COPD (chronic obstructive pulmonary disease) ([address])    • Descending thoracic aortic aneurysm (HCC)    • Diabetes mellitus ([address])    • Diverticulosis    • Former tobacco use    • GERD (gastroesophageal reflux disease)    • History of DVT (deep vein thrombosis)     Left leg   • History of transfusion    • Hypertension    • Inguinal hernia     right   • Nephrolithiasis    • Oxygen dependent     2LNC   • Oxygen dependent    • Pneumonia    • Pre-diabetes    • Prostate calculus    • PVD (peripheral vascular disease) ([address])    • Thoracic aortic aneurysm without rupture ([address]) 11/19/2018    Added automatically from request for surgery [id number]   • Thrombocytopenia ([address]) 12/29/2018   • Ulcer    • 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[address])    • Varicose vein of leg     b/l       Past Surgical History:   Procedure Laterality Date   • CARDIAC SURGERY     • ESOPHAGOGASTRODUODENOSCOPY     • HERNIA REPAIR Right 1/21/2019    Procedure: REPAIR HERNIA INGUINAL WITH MESH;  Surgeon: Ivan Burton MD;  Location: [address] [place] [address] MAIN OR;  Service: General   • HERNIA REPAIR Right 7/22/2023    Procedure: REPAIR RECURRENT INCARERATED HERNIA INGUINAL OPEN, RIGHT ORCHIECTOMY;  Surgeon: Romie June MD;  Location: [facility] Main OR;  Service: General   • INGUINAL HERNIA REPAIR Bilateral    • IR TEVAR  12/27/2018   • AL EVASC RPR DTA COVERAGE ART ORIGIN 1ST ENDOPROSTH N/A 12/27/2018    Procedure: TEVAR - endovascular thoracic aortic aneurysm repair;  Surgeon: Yari Coleman MD;  Location: [facility] MAIN OR;  Service: Vascular   • THORACIC AORTIC ANEURYSM REPAIR  12/27/2018   • VARICOSE VEIN SURGERY Bilateral     vein stripping       Family History   Problem Relation Age of Onset   • Tuberculosis Mother    • No Known Problems Father    • Cancer Sister    • Diabetes Family    • Hypertension Family         reports that he quit smoking about 22 years ago. His smoking use included cigarettes. He started smoking about 57 years ago. He has a 35.00 pack-year smoking history. He has never used smokeless tobacco. He reports that he does not drink alcohol and does not use drugs.     Vitals:    08/08/23 1340   BP: 157/84   Pulse: 96   Resp: 16   Temp: 97.6 °F (36.4 °C)        PHYSICAL EXAM  General: normal, cooperative, no distress  Abdominal: soft, nondistended or nontender  Incision: clean, dry, and intact and healing well    Claudell Exon, PA-C    Date: 8/8/2023 Time: 2:15 PM

## 2023-08-04 ENCOUNTER — PATIENT OUTREACH (OUTPATIENT)
Dept: FAMILY MEDICINE CLINIC | Facility: CLINIC | Age: 88
End: 2023-08-04

## 2023-08-04 NOTE — LETTER
Fecha: 08/04/23    Estimado/a Severiano Feliciano:      Mi nombre es Bridget Love un enfermero registrado administrador de atención de [facility]   No pude comunicarme con usted y me gustaría programar un horario para que hablemos por teléfono o personalmente. Me dedico a ayudar a los pacientes que tienen afecciones médicas complejas a obtener la atención que necesitan. De Beque comprende a pacientes que pueden estar en el hospital o en sj cristian de emergencias. Adjunto información para usted. Si tiene preguntas, no dude en llamarme. Espero rosa llamado. Atentamente.   Conner Mcmillan RN   [phone]  Minh Francis ambulatoria

## 2023-08-08 ENCOUNTER — OFFICE VISIT (OUTPATIENT)
Dept: SURGERY | Facility: CLINIC | Age: 88
End: 2023-08-08

## 2023-08-08 VITALS
SYSTOLIC BLOOD PRESSURE: 157 MMHG | HEART RATE: 96 BPM | DIASTOLIC BLOOD PRESSURE: 84 MMHG | WEIGHT: 132.4 LBS | RESPIRATION RATE: 16 BRPM | BODY MASS INDEX: 22.06 KG/M2 | HEIGHT: 65 IN | TEMPERATURE: 97.6 F

## 2023-08-08 DIAGNOSIS — Z09 POSTOP CHECK: Primary | ICD-10-CM

## 2023-08-08 PROCEDURE — 99024 POSTOP FOLLOW-UP VISIT: CPT | Performed by: PHYSICIAN ASSISTANT

## 2023-08-13 DIAGNOSIS — R62.7 FAILURE TO THRIVE IN ADULT: ICD-10-CM

## 2023-08-13 DIAGNOSIS — R26.2 AMBULATORY DYSFUNCTION: Primary | ICD-10-CM

## 2023-08-14 DIAGNOSIS — J44.1 CHRONIC OBSTRUCTIVE PULMONARY DISEASE WITH ACUTE EXACERBATION (HCC): Primary | ICD-10-CM

## 2023-08-15 ENCOUNTER — CONSULT (OUTPATIENT)
Dept: PULMONOLOGY | Facility: CLINIC | Age: 88
End: 2023-08-15
Payer: MEDICARE

## 2023-08-15 VITALS
WEIGHT: 127.2 LBS | BODY MASS INDEX: 22.54 KG/M2 | HEIGHT: 63 IN | OXYGEN SATURATION: 93 % | DIASTOLIC BLOOD PRESSURE: 78 MMHG | HEART RATE: 66 BPM | RESPIRATION RATE: 16 BRPM | SYSTOLIC BLOOD PRESSURE: 150 MMHG

## 2023-08-15 DIAGNOSIS — J44.9 CHRONIC OBSTRUCTIVE PULMONARY DISEASE, UNSPECIFIED COPD TYPE (HCC): Primary | ICD-10-CM

## 2023-08-15 DIAGNOSIS — J44.1 CHRONIC OBSTRUCTIVE PULMONARY DISEASE WITH ACUTE EXACERBATION (HCC): ICD-10-CM

## 2023-08-15 DIAGNOSIS — J96.11 CHRONIC RESPIRATORY FAILURE WITH HYPOXIA AND HYPERCAPNIA (HCC): ICD-10-CM

## 2023-08-15 DIAGNOSIS — J96.12 CHRONIC RESPIRATORY FAILURE WITH HYPOXIA AND HYPERCAPNIA (HCC): ICD-10-CM

## 2023-08-15 PROCEDURE — 99214 OFFICE O/P EST MOD 30 MIN: CPT | Performed by: INTERNAL MEDICINE

## 2023-08-15 PROCEDURE — 94618 PULMONARY STRESS TESTING: CPT | Performed by: INTERNAL MEDICINE

## 2023-08-15 RX ORDER — ALBUTEROL SULFATE 2.5 MG/3ML
2.5 SOLUTION RESPIRATORY (INHALATION) EVERY 6 HOURS PRN
Qty: 60 ML | Refills: 3 | Status: SHIPPED | OUTPATIENT
Start: 2023-08-15 | End: 2023-08-15

## 2023-08-15 RX ORDER — ALBUTEROL SULFATE 90 UG/1
2 AEROSOL, METERED RESPIRATORY (INHALATION) EVERY 6 HOURS PRN
Qty: 18 G | Refills: 5 | Status: SHIPPED | OUTPATIENT
Start: 2023-08-15 | End: 2023-08-15

## 2023-08-15 RX ORDER — FLUTICASONE FUROATE, UMECLIDINIUM BROMIDE AND VILANTEROL TRIFENATATE 200; 62.5; 25 UG/1; UG/1; UG/1
1 POWDER RESPIRATORY (INHALATION) DAILY
Qty: 60 BLISTER | Refills: 5 | Status: SHIPPED | OUTPATIENT
Start: 2023-08-15 | End: 2023-08-15

## 2023-08-15 RX ORDER — BUDESONIDE 0.5 MG/2ML
0.5 INHALANT ORAL 2 TIMES DAILY
Qty: 120 ML | Refills: 5 | Status: SHIPPED | OUTPATIENT
Start: 2023-08-15 | End: 2023-09-14

## 2023-08-15 RX ORDER — FORMOTEROL FUMARATE 20 UG/2ML
20 SOLUTION RESPIRATORY (INHALATION) 2 TIMES DAILY
Qty: 120 ML | Refills: 30 | Status: SHIPPED | OUTPATIENT
Start: 2023-08-15

## 2023-08-15 RX ORDER — ALBUTEROL SULFATE 90 UG/1
2 AEROSOL, METERED RESPIRATORY (INHALATION) EVERY 6 HOURS PRN
Qty: 18 G | Refills: 5 | Status: SHIPPED | OUTPATIENT
Start: 2023-08-15

## 2023-08-15 RX ORDER — IPRATROPIUM BROMIDE AND ALBUTEROL SULFATE 2.5; .5 MG/3ML; MG/3ML
3 SOLUTION RESPIRATORY (INHALATION) 3 TIMES DAILY
Qty: 270 ML | Refills: 5 | Status: SHIPPED | OUTPATIENT
Start: 2023-08-15 | End: 2023-09-14

## 2023-08-15 NOTE — PATIENT INSTRUCTIONS
For COPD  Stop [employer]- via new nebulizer  Budesonide (AKA Pulmicort) nebulizer twice a day  Formoterol (AKA Perforomist) nebulizer twice a day  Duoneb- 3 x a day by neb (also can use in rescue situation)      Use oxygen as prescribed    Get Pulmonary function testing  Pulmonary rehab    See me in 3 months

## 2023-08-15 NOTE — ASSESSMENT & PLAN NOTE
Baseline O2 requirement 2L at rest and 3L on exertion. Patient has only been using O2 as he feels he needs it. Discussed importance of using home O2 as prescribed to avoid confusion, falls, respiratory distress, risk of arrhythmia leading to cardiac arrest  ect. Plan:  6 min walk test done in office today. Showing O2 requirement of 4L on exertion and 2 L at rest.  Continue to exercise as tolerated. Refer to pulmonary rehab. Encouraged patient to attend pulm rehab and the importance of pulmonary rehab.

## 2023-08-15 NOTE — PROGRESS NOTES
Pulmonary Outpatient Consultation Note   Crista Cleary 82 y.o. male MRN: [id number]  8/15/2023      Referring Physician: Dr. Helena Sexton MD  Reason for Consultation: COPD with exacerbation       Assessment/Plan:    Problem List Items Addressed This Visit        Respiratory    Chronic obstructive pulmonary disease with acute exacerbation (HCC)    Relevant Medications    budesonide (Pulmicort) 0.5 mg/2 mL nebulizer solution    formoterol (PERFOROMIST) 20 MCG/2ML nebulizer solution    ipratropium-albuterol (DUO-NEB) 0.5-2.5 mg/3 mL nebulizer solution    Chronic respiratory failure with hypoxia (HCC)     Baseline O2 requirement 2L at rest and 3L on exertion. Patient has only been using O2 as he feels he needs it. Discussed importance of using home O2 as prescribed to avoid confusion, falls, respiratory distress, risk of arrhythmia leading to cardiac arrest  ect. Plan:  6 min walk test done in office today. Showing O2 requirement of 4L on exertion and 2 L at rest.  Continue to exercise as tolerated. Refer to pulmonary rehab. Encouraged patient to attend pulm rehab and the importance of pulmonary rehab. Chronic obstructive pulmonary disease (HCC) - Primary    Relevant Medications    budesonide (Pulmicort) 0.5 mg/2 mL nebulizer solution    formoterol (PERFOROMIST) 20 MCG/2ML nebulizer solution    ipratropium-albuterol (DUO-NEB) 0.5-2.5 mg/3 mL nebulizer solution    Other Relevant Orders    POCT Oxygen Titration    Nebulizer    Complete PFT with post bronchodilator    Ambulatory Referral to Pulmonary Rehabilitation         Health Maintenance  Immunization History   Administered Date(s) Administered   • COVID-19 PFIZER VACCINE 0.3 ML IM 05/07/2021, 06/01/2021   • Pneumococcal Conjugate 13-Valent 08/24/2016   • Pneumococcal Polysaccharide PPV23 11/16/2017, 08/02/2018       CT Lung Cancer Screening:    No follow-ups on file.     History of Present Illness   HPI:  Crista Cleary is a 82 y.o. male with pmhx of descending aortic aneurysm, htn, acute on chronic diastolic CHF, copd, hld, thoracic aneurysm s/p repair who presents to pulmonary clinic for consultation for COPD. Patient has had multiple hospitalizations for COPD in the past year. Most recent hospital admission for COPD exacerbation on 4/9/23 requiring systemic steroids. Additionally, hospital admission on 12/4/23 for COPD exacerbation requiring systemic steroids. Patient feels like he breaths ok. He has no concerns with how his breathing has been and feels well overall. He states he walks in the park for ten to fifteen minutes without O2 but has it near by incase he requires O2. Denies dyspnea on exertion or at rest, orthopnea, cough, chest pain, or swelling in legs. Patient is supposed to be on NC 2 L at rest and 3L on exertion. Patient does not use the oxygen all of the time because he does not want to be reliant on the O2. They have a pulse ox at home and only check when he is ill at which point he will be in the low 80's. He states he uses stiolto once a day and occasionally will use stiolto twice a day. Uses albuterol inhaler once or twice a week. He states albuterol recieves symptoms. Patient states he stopped smoking 20-30 years ago. He states he smoked for 40 years. Patient states he smoked 10 cigarettes a day. Patient reports seasonal allergies but does not take anything currently for allergies. Patient has rhinorrhea frequently with clear rhinorrhea. He does not take any medication for this. Patient denies GERD. Up to date on vaccinations. Received influenza vaccine in winter of 2022. Up to date on pneumococcal vaccine. Patient endorses 1 cat and 1 dog in home. 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 Review of Systems   Constitutional: Negative for chills and fever.    HENT: Negative for rhinorrhea and sore throat. Eyes: Negative for redness and visual disturbance. Respiratory: Negative for cough and shortness of breath. Cardiovascular: Negative for chest pain and palpitations. Gastrointestinal: Negative for abdominal pain, constipation and nausea. Genitourinary: Negative for difficulty urinating and dysuria. Musculoskeletal: Negative for arthralgias and myalgias. Skin: Negative for rash. Allergic/Immunologic: Negative for environmental allergies and food allergies. Neurological: Negative for dizziness and headaches.        Historical Information   Past Medical History:   Diagnosis Date   • Acute metabolic encephalopathy 83/09/8430   • Anemia    • Aneurysm, aorta, thoracic (HCC)    • Appendicolith    • Ascending aortic aneurysm ([facility])     3.7   • Asthma    • BPH (benign prostatic hyperplasia)    • CAD (coronary artery disease)     noted on CT scan   • CHF (congestive heart failure) ([facility])    • COPD (chronic obstructive pulmonary disease) (HCC)    • Descending thoracic aortic aneurysm (HCC)    • Diabetes mellitus ([address])    • Diverticulosis    • Former tobacco use    • GERD (gastroesophageal reflux disease)    • History of DVT (deep vein thrombosis)     Left leg   • History of transfusion    • Hypertension    • Inguinal hernia     right   • Nephrolithiasis    • Oxygen dependent     2LNC   • Oxygen dependent    • Pneumonia    • Pre-diabetes    • Prostate calculus    • PVD (peripheral vascular disease) ([address])    • Thoracic aortic aneurysm without rupture ([address]) 11/19/2018    Added automatically from request for surgery [id number]   • Thrombocytopenia ([address]) 12/29/2018   • Ulcer    • 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[address])    • Varicose vein of leg     b/l     Past Surgical History:   Procedure Laterality Date   • CARDIAC SURGERY     • ESOPHAGOGASTRODUODENOSCOPY     • HERNIA REPAIR Right 1/21/2019    Procedure: REPAIR HERNIA INGUINAL WITH MESH;  Surgeon: Nabil Gomes MD;  Location: [facility] MAIN OR;  Service: General   • HERNIA REPAIR Right 7/22/2023    Procedure: REPAIR RECURRENT INCARERATED HERNIA INGUINAL OPEN, RIGHT ORCHIECTOMY;  Surgeon: Doreen Stafford MD;  Location: [facility];  Service: General   • INGUINAL HERNIA REPAIR Bilateral    • IR TEVAR  12/27/2018   • NE EVASC RPR DTA COVERAGE ART ORIGIN 1ST ENDOPROSTH N/A 12/27/2018    Procedure: TEVAR - endovascular thoracic aortic aneurysm repair;  Surgeon: Angela eLal MD;  Location: [facility] MAIN OR;  Service: Vascular   • THORACIC AORTIC ANEURYSM REPAIR  12/27/2018   • VARICOSE VEIN SURGERY Bilateral     vein stripping     Family History   Problem Relation Age of Onset   • Tuberculosis Mother    • No Known Problems Father    • Cancer Sister    • Diabetes Family    • Hypertension Family        Occupational History: worked as  and repairing houses       Meds/Allergies     Current Outpatient Medications:   •  albuterol (PROVENTIL HFA,VENTOLIN HFA) 90 mcg/act inhaler, INHALE 2 PUFFS EVERY 6 (SIX) HOURS AS NEEDED FOR WHEEZING, Disp: 18 g, Rfl: 5  •  brimonidine tartrate 0.2 % ophthalmic solution, , Disp: , Rfl:   •  budesonide (Pulmicort) 0.5 mg/2 mL nebulizer solution, Take 2 mL (0.5 mg total) by nebulization 2 (two) times a day Rinse mouth after use., Disp: 120 mL, Rfl: 5  •  formoterol (PERFOROMIST) 20 MCG/2ML nebulizer solution, Take 2 mL (20 mcg total) by nebulization 2 (two) times a day, Disp: 120 mL, Rfl: 30  •  ipratropium-albuterol (DUO-NEB) 0.5-2.5 mg/3 mL nebulizer solution, Take 3 mL by nebulization 3 (three) times a day, Disp: 270 mL, Rfl: 5  •  losartan (COZAAR) 25 mg tablet, Take 1 tablet (25 mg total) by mouth daily, Disp: 90 tablet, Rfl: 3  •  ofloxacin (OCUFLOX) 0.3 % ophthalmic solution, Apply 1 drop to eye 4 (four) times a day, Disp: 5 mL, Rfl: 0  •  pantoprazole (PROTONIX) 20 mg tablet, Take 1 tablet (20 mg total) by mouth daily before breakfast, Disp: 30 tablet, Rfl: 5  •  predniSONE 5 mg tablet, Take 1 tablet (5 mg total) by mouth General: No focal deficit present. Mental Status: He is alert and oriented to person, place, and time. Labs: I have personally reviewed pertinent lab results. Lab Results   Component Value Date    WBC 8.10 07/25/2023    HGB 10.4 (L) 07/25/2023    HCT 34.5 (L) 07/25/2023     (H) 07/25/2023    PLT 91 (L) 07/25/2023     Lab Results   Component Value Date    GLUCOSE 136 12/27/2018    CALCIUM 8.5 07/25/2023     03/01/2014    K 3.9 07/25/2023    CO2 38 (H) 07/25/2023     07/25/2023    BUN 15 07/25/2023    CREATININE 0.81 07/25/2023     No results found for: "IGE"  Lab Results   Component Value Date    ALT 11 07/22/2023    AST 20 07/22/2023    ALKPHOS 69 07/22/2023         Imaging and other studies: I have personally reviewed pertinent reports. CT chest (9/16/23): chronic emphysema and mild linear scar. Stable inflammatory/infectious type pulmonary nodules suspected secondary to aspiration. Pulmonary function testing:   Unavailable, have never been completed     EKG, Pathology, and Other Studies: I have personally reviewed pertinent reports. · echo 8/80/98: normal systolic funtion. wall motion is normal. diastolic function is midly abnormal consistent with grade 1 abnormal relaxation. TV mild regurge. Chemo Fierro MD PGY2   Family Medicine.

## 2023-08-16 ENCOUNTER — DOCUMENTATION (OUTPATIENT)
Dept: PULMONOLOGY | Facility: CLINIC | Age: 88
End: 2023-08-16

## 2023-08-18 LAB

## 2023-08-21 ENCOUNTER — PATIENT OUTREACH (OUTPATIENT)
Dept: FAMILY MEDICINE CLINIC | Facility: CLINIC | Age: 88
End: 2023-08-21

## 2023-08-21 LAB
DME PARACHUTE DELIVERY DATE EXPECTED: NORMAL
DME PARACHUTE DELIVERY DATE REQUESTED: NORMAL
DME PARACHUTE ITEM DESCRIPTION: NORMAL
DME PARACHUTE ORDER STATUS: NORMAL
DME PARACHUTE SUPPLIER NAME: NORMAL
DME PARACHUTE SUPPLIER PHONE: NORMAL

## 2023-09-21 DIAGNOSIS — J44.9 CHRONIC OBSTRUCTIVE PULMONARY DISEASE, UNSPECIFIED COPD TYPE (HCC): ICD-10-CM

## 2023-09-21 RX ORDER — ALBUTEROL SULFATE 2.5 MG/3ML
2.5 SOLUTION RESPIRATORY (INHALATION) EVERY 6 HOURS PRN
Qty: 75 ML | Refills: 3 | Status: SHIPPED | OUTPATIENT
Start: 2023-09-21

## 2023-09-25 ENCOUNTER — TELEPHONE (OUTPATIENT)
Dept: FAMILY MEDICINE CLINIC | Facility: CLINIC | Age: 88
End: 2023-09-25

## 2023-09-28 ENCOUNTER — OFFICE VISIT (OUTPATIENT)
Dept: FAMILY MEDICINE CLINIC | Facility: CLINIC | Age: 88
End: 2023-09-28
Payer: MEDICARE

## 2023-09-28 ENCOUNTER — TELEPHONE (OUTPATIENT)
Dept: FAMILY MEDICINE CLINIC | Facility: CLINIC | Age: 88
End: 2023-09-28

## 2023-09-28 VITALS
DIASTOLIC BLOOD PRESSURE: 100 MMHG | RESPIRATION RATE: 17 BRPM | BODY MASS INDEX: 22.5 KG/M2 | TEMPERATURE: 96.9 F | HEART RATE: 65 BPM | OXYGEN SATURATION: 94 % | WEIGHT: 127 LBS | SYSTOLIC BLOOD PRESSURE: 168 MMHG

## 2023-09-28 DIAGNOSIS — R63.4 WEIGHT LOSS: ICD-10-CM

## 2023-09-28 DIAGNOSIS — I49.1 PAC (PREMATURE ATRIAL CONTRACTION): ICD-10-CM

## 2023-09-28 DIAGNOSIS — J96.11 CHRONIC RESPIRATORY FAILURE WITH HYPOXIA (HCC): ICD-10-CM

## 2023-09-28 DIAGNOSIS — J44.1 CHRONIC OBSTRUCTIVE PULMONARY DISEASE WITH ACUTE EXACERBATION (HCC): Primary | ICD-10-CM

## 2023-09-28 DIAGNOSIS — R05.1 ACUTE COUGH: ICD-10-CM

## 2023-09-28 DIAGNOSIS — I10 ACCELERATED ESSENTIAL HYPERTENSION: ICD-10-CM

## 2023-09-28 DIAGNOSIS — R51.9 ACUTE NONINTRACTABLE HEADACHE, UNSPECIFIED HEADACHE TYPE: ICD-10-CM

## 2023-09-28 DIAGNOSIS — N18.31 CHRONIC KIDNEY DISEASE, STAGE 3A (HCC): ICD-10-CM

## 2023-09-28 DIAGNOSIS — I50.32 CHRONIC DIASTOLIC CHF (CONGESTIVE HEART FAILURE) (HCC): ICD-10-CM

## 2023-09-28 PROBLEM — H25.9 AGE-RELATED CATARACT OF RIGHT EYE: Status: RESOLVED | Noted: 2023-04-04 | Resolved: 2023-09-28

## 2023-09-28 PROBLEM — K40.91 RECURRENT RIGHT INGUINAL HERNIA: Status: RESOLVED | Noted: 2023-01-04 | Resolved: 2023-09-28

## 2023-09-28 PROBLEM — R10.9 ABDOMINAL PAIN: Status: RESOLVED | Noted: 2019-01-19 | Resolved: 2023-09-28

## 2023-09-28 PROBLEM — J96.20 ACUTE ON CHRONIC RESPIRATORY FAILURE (HCC): Status: RESOLVED | Noted: 2022-12-04 | Resolved: 2023-09-28

## 2023-09-28 PROBLEM — K56.609 SMALL BOWEL OBSTRUCTION (HCC): Status: RESOLVED | Noted: 2022-12-28 | Resolved: 2023-09-28

## 2023-09-28 PROCEDURE — 93000 ELECTROCARDIOGRAM COMPLETE: CPT | Performed by: PHYSICIAN ASSISTANT

## 2023-09-28 PROCEDURE — 87636 SARSCOV2 & INF A&B AMP PRB: CPT | Performed by: PHYSICIAN ASSISTANT

## 2023-09-28 PROCEDURE — 99214 OFFICE O/P EST MOD 30 MIN: CPT | Performed by: PHYSICIAN ASSISTANT

## 2023-09-28 RX ORDER — CEFPODOXIME PROXETIL 200 MG/1
200 TABLET, FILM COATED ORAL 2 TIMES DAILY
Qty: 10 TABLET | Refills: 0 | Status: SHIPPED | OUTPATIENT
Start: 2023-09-28 | End: 2023-10-03

## 2023-09-28 RX ORDER — AZITHROMYCIN 250 MG/1
TABLET, FILM COATED ORAL
Qty: 6 TABLET | Refills: 0 | Status: SHIPPED | OUTPATIENT
Start: 2023-09-28 | End: 2023-10-02

## 2023-09-28 RX ORDER — PANTOPRAZOLE SODIUM 40 MG/1
40 TABLET, DELAYED RELEASE ORAL DAILY
COMMUNITY
Start: 2023-08-21 | End: 2023-10-04

## 2023-09-28 RX ORDER — FLUTICASONE FUROATE, UMECLIDINIUM BROMIDE AND VILANTEROL TRIFENATATE 200; 62.5; 25 UG/1; UG/1; UG/1
POWDER RESPIRATORY (INHALATION)
COMMUNITY
Start: 2023-08-15

## 2023-09-28 RX ORDER — PREDNISONE 10 MG/1
TABLET ORAL
Qty: 30 TABLET | Refills: 0 | Status: SHIPPED | OUTPATIENT
Start: 2023-09-28 | End: 2023-10-10

## 2023-09-28 NOTE — ASSESSMENT & PLAN NOTE
Lab Results   Component Value Date    EGFR 81 07/25/2023    EGFR 81 07/24/2023    EGFR 67 07/23/2023    CREATININE 0.81 07/25/2023    CREATININE 0.79 07/24/2023    CREATININE 1.01 07/23/2023     Stable kidney function, no concerns.

## 2023-09-28 NOTE — ASSESSMENT & PLAN NOTE
Wt Readings from Last 3 Encounters:   09/28/23 57.6 kg (127 lb)   08/15/23 57.7 kg (127 lb 3.2 oz)   08/08/23 60.1 kg (132 lb 6.4 oz)     Lab Results   Component Value Date    BNP 75 07/22/2023      Stable without diuretic. Do not suspect fluid overload. Monitor for leg swelling. Reviewed most recent echo in 4/2023 with 66% EF.

## 2023-09-28 NOTE — ASSESSMENT & PLAN NOTE
Baseline O2 requirement 2 to 3 L at rest in the home, he uses 4 L on exertion outside the home. Recommend continue 4 L to maintain O2 saturation >92%. Stable on 4 L 94% in office today.

## 2023-09-28 NOTE — ASSESSMENT & PLAN NOTE
Lab Results   Component Value Date     03/01/2014    SODIUM 139 07/25/2023    K 3.9 07/25/2023     07/25/2023    CO2 38 (H) 07/25/2023    ANIONGAP 0 (L) 03/01/2014    AGAP 1 07/25/2023    BUN 15 07/25/2023    CREATININE 0.81 07/25/2023    GLUC 109 07/25/2023    GLUF 109 (H) 06/12/2020    CALCIUM 8.5 07/25/2023    AST 20 07/22/2023    ALT 11 07/22/2023    ALKPHOS 69 07/22/2023    TP 7.7 07/22/2023    TBILI 0.81 07/22/2023    EGFR 81 07/25/2023

## 2023-09-28 NOTE — ASSESSMENT & PLAN NOTE
Suspect based on symptoms today, possibly due to COVID, family members who live with him had COVID 2 weeks ago. Penicillin allergy. Start Z-Kenneth and cefpodoxime x 5 days for COPD exacerbation with history of pneumonia. Most recent in 4/2023. Check chest x-ray if no improvement. No signs of fluid overload. No swelling in legs. Monitor respiratory status and cognition for change in mental status. Son and wife is present for exam.  They have pulse ox and blood pressure monitor at home and discussed reasons to proceed to ER. Patient not currently agreeable to ER evaluation today. Missed appointment for PFTs. Continue Trelegy, on chronic prednisone 5 mg. Will start taper of 40 mg down every 3 days.

## 2023-09-28 NOTE — ASSESSMENT & PLAN NOTE
Significantly elevated blood pressure on exam today. Did not take his losartan. Recommend take losartan immediately when return home. Consider twice daily dosing pending home BP monitoring. Son is aware of BP parameters. Discussed reasons to return to ER. Reviewed ECG in office today with sinus rhythm and PACs, no significant change from previous.

## 2023-09-28 NOTE — PROGRESS NOTES
Name: Natalia Dewitt      :       MRN: [id number]  Encounter Provider: Julio Maza PA-C  Encounter Date: 2023   Encounter department: [address] [place] [address]   Asymptomatic, very high blood pressure on exam, improved with recheck, did not take medication today for BP. No vision change. Discussed reasons to proceed to ER including worsening confusion, hypoxia, fever, chest pain, worsening symptoms. Patient not agreeable to ER today. Good family support with son and wife present for exam.  Shared decision making. Discussed goals of care with acute exacerbation, patient agreeable and will proceed to the ER if worsening of symptoms. Suspect COVID due to family members with positive COVID about 2 weeks ago. No fevers. 1. Chronic obstructive pulmonary disease with acute exacerbation ([address])  Assessment & Plan:  Suspect based on symptoms today, possibly due to COVID, family members who live with him had COVID 2 weeks ago. Penicillin allergy. Start Z-Kenneth and cefpodoxime x 5 days for COPD exacerbation with history of pneumonia. Most recent in 2023. Check chest x-ray if no improvement. No signs of fluid overload. No swelling in legs. Monitor respiratory status and cognition for change in mental status. Son and wife is present for exam.  They have pulse ox and blood pressure monitor at home and discussed reasons to proceed to ER. Patient not currently agreeable to ER evaluation today. Missed appointment for PFTs. Continue Trelegy, on chronic prednisone 5 mg. Will start taper of 40 mg down every 3 days. 2. Acute cough  Comments:  x2 days, suspect COPD exacerbation, COVID contact in home, pending COVID/flu testing  Orders:  -     predniSONE 10 mg tablet;  Take 4 tablets (40 mg total) by mouth daily for 3 days, THEN 3 tablets (30 mg total) daily for 3 days, THEN 2 tablets (20 mg total) daily for 3 days, THEN 1 tablet (10 mg total) daily for 3 days. 4 tabs daily for 3 days,  3 tabs daily for 3 days, 2 tabs daily for 3 days, 1 tab daily for 3 days. -     azithromycin (ZITHROMAX) 250 mg tablet; Take 2 tablets today then 1 tablet daily x 4 days  -     cefpodoxime (VANTIN) 200 mg tablet; Take 1 tablet (200 mg total) by mouth 2 (two) times a day for 5 days  -     Covid/Flu- Office Collect  -     XR chest pa & lateral; Future; Expected date: 09/28/2023  -     Comprehensive metabolic panel; Future  -     CBC and differential; Future  -     B-Type Natriuretic Peptide(BNP); Future    3. Acute nonintractable headache, unspecified headache type  Comments:  x 2 days, suspect due to hypoxia and high BP, recommend 4L at home and maintain O2 sat >92-94%    4. Accelerated essential hypertension  Assessment & Plan:  Significantly elevated blood pressure on exam today. Did not take his losartan. Recommend take losartan immediately when return home. Consider twice daily dosing pending home BP monitoring. Son is aware of BP parameters. Discussed reasons to return to ER. Reviewed ECG in office today with sinus rhythm and PACs, no significant change from previous. 5. Chronic respiratory failure with hypoxia (HCC)  Assessment & Plan:  Baseline O2 requirement 2 to 3 L at rest in the home, he uses 4 L on exertion outside the home. Recommend continue 4 L to maintain O2 saturation >92%. Stable on 4 L 94% in office today. 6. PAC (premature atrial contraction)  Assessment & Plan:  Reviewed previous ECG. No significant change. Asymptomatic. Orders:  -     POCT ECG    7. Chronic diastolic CHF (congestive heart failure) (HCC)  Assessment & Plan:  Wt Readings from Last 3 Encounters:   09/28/23 57.6 kg (127 lb)   08/15/23 57.7 kg (127 lb 3.2 oz)   08/08/23 60.1 kg (132 lb 6.4 oz)     Lab Results   Component Value Date    BNP 75 07/22/2023      Stable without diuretic. Do not suspect fluid overload. Monitor for leg swelling.   Reviewed most recent echo in 4/2023 with 66% EF. 8. Chronic kidney disease, stage 3a [facility])  Assessment & Plan:  Lab Results   Component Value Date    EGFR 81 07/25/2023    EGFR 81 07/24/2023    EGFR 67 07/23/2023    CREATININE 0.81 07/25/2023    CREATININE 0.79 07/24/2023    CREATININE 1.01 07/23/2023     Stable kidney function, no concerns. 9. Weight loss  Assessment & Plan:  Lab Results   Component Value Date     03/01/2014    SODIUM 139 07/25/2023    K 3.9 07/25/2023     07/25/2023    CO2 38 (H) 07/25/2023    ANIONGAP 0 (L) 03/01/2014    AGAP 1 07/25/2023    BUN 15 07/25/2023    CREATININE 0.81 07/25/2023    GLUC 109 07/25/2023    GLUF 109 (H) 06/12/2020    CALCIUM 8.5 07/25/2023    AST 20 07/22/2023    ALT 11 07/22/2023    ALKPHOS 69 07/22/2023    TP 7.7 07/22/2023    TBILI 0.81 07/22/2023    EGFR 81 07/25/2023     Reviewed CT abd/pelvis in 7/2023. 10 lb weight loss since 7/2023. Terrence Chen is a 82 y.o. male with a h/o COPD, former smoker, chronic hypoxia on home oxygen, CHF, recent cataract extraction with lens replacement in 4/2023, recent recurrent hernia repair due to incarceration and SBO in 7/2023 who presents as a same-day patient with cough and headache x2 days. Son reports that he seems a little more confused than normal.  Does have some baseline. No productive cough. Family members with COVID in the home. They got better already. Usually using 2 to 3 L. Has not increased to 4 L at home. Usually only use 4 L when he is outside the house. Not checking oxygen or blood pressure currently at home. Did not take blood pressure medicine because did not eat anything today yet. He refused to go to the ER so brought into office. Patient repeatedly reports "I feel fine besides cough."  Had fever low-grade yesterday. No leg swelling, no recent falls, no head injury. No vision change. No chest pain or palpitations.     Son present for exam and helped by portion of history in English and [place]  Wife present for exam and helped provide a portion of history in [place]    History was conducted in [place] without the use of . Review of Systems   Constitutional: Positive for fatigue. Negative for chills and fever. HENT: Positive for congestion. Negative for postnasal drip, rhinorrhea, sinus pressure, sinus pain, sore throat and trouble swallowing. Eyes: Negative for pain, discharge, redness and itching. Respiratory: Positive for cough and shortness of breath. Negative for wheezing. Cardiovascular: Negative for chest pain, palpitations and leg swelling. Gastrointestinal: Negative for abdominal pain, diarrhea, nausea and vomiting. Neurological: Negative for headaches. Psychiatric/Behavioral: Positive for confusion. Negative for sleep disturbance. Current Outpatient Medications on File Prior to Visit   Medication Sig   • albuterol (2.5 mg/3 mL) 0.083 % nebulizer solution TAKE 3 ML (2.5 MG TOTAL) BY NEBULIZATION EVERY 6 (SIX) HOURS AS NEEDED FOR WHEEZING OR SHORTNESS OF BREATH   • albuterol (PROVENTIL HFA,VENTOLIN HFA) 90 mcg/act inhaler INHALE 2 PUFFS EVERY 6 (SIX) HOURS AS NEEDED FOR WHEEZING   • brimonidine tartrate 0.2 % ophthalmic solution    • formoterol (PERFOROMIST) 20 MCG/2ML nebulizer solution Take 2 mL (20 mcg total) by nebulization 2 (two) times a day   • hydroxypropyl methylcellulose (GONAK) 2.5 % ophthalmic solution Apply 1 drop to eye Three times a day   • losartan (COZAAR) 25 mg tablet Take 1 tablet (25 mg total) by mouth daily   • pantoprazole (PROTONIX) 40 mg tablet Take 40 mg by mouth daily   • predniSONE 5 mg tablet Take 1 tablet (5 mg total) by mouth daily   • Trelegy Ellipta 200-62.5-25 MCG/ACT AEPB inhaler INHALE 1 PUFF DAILY RINSE MOUTH AFTER USE.    • [DISCONTINUED] ofloxacin (OCUFLOX) 0.3 % ophthalmic solution Apply 1 drop to eye 4 (four) times a day   • [DISCONTINUED] pantoprazole (PROTONIX) 20 mg tablet Take 1 tablet (20 mg total) by mouth daily before breakfast   • budesonide (Pulmicort) 0.5 mg/2 mL nebulizer solution Take 2 mL (0.5 mg total) by nebulization 2 (two) times a day Rinse mouth after use. • [DISCONTINUED] ketorolac (ACULAR) 0.5 % ophthalmic solution ADMINISTER 1 DROP TO BOTH EYES 4 (FOUR) TIMES A DAY (Patient not taking: Reported on 8/15/2023)   • [DISCONTINUED] prednisoLONE acetate (PRED FORTE) 1 % ophthalmic suspension APPLY 1 DROP TO EYE 3 (THREE) TIMES A DAY (Patient not taking: Reported on 8/15/2023)       Objective     /100   Pulse 65   Temp (!) 96.9 °F (36.1 °C) (Tympanic)   Resp 17   Wt 57.6 kg (127 lb)   SpO2 94%   BMI 22.50 kg/m²     Physical Exam  Vitals and nursing note reviewed. Constitutional:       General: He is not in acute distress. Appearance: He is ill-appearing (chronically ). HENT:      Head: Normocephalic and atraumatic. Nose: No congestion or rhinorrhea. Mouth/Throat:      Pharynx: No oropharyngeal exudate or posterior oropharyngeal erythema. Eyes:      Extraocular Movements: Extraocular movements intact. Pupils: Pupils are equal, round, and reactive to light. Comments: Improved s/p cataract extraction   Cardiovascular:      Rate and Rhythm: Normal rate. Rhythm irregular. Pulses: Normal pulses. Heart sounds: Normal heart sounds. Pulmonary:      Effort: No respiratory distress (no use of accessory muscle, no pursed lip breathing). Breath sounds: Normal breath sounds. No wheezing. Comments: Poor effort, limited exam due to use of 4L nasal cannula  Musculoskeletal:      Right lower leg: No edema. Left lower leg: No edema. Skin:     General: Skin is dry. Neurological:      Mental Status: He is alert and oriented to person, place, and time.        Alexandro Credit, JEREMIE

## 2023-09-28 NOTE — ASSESSMENT & PLAN NOTE
Lab Results   Component Value Date     03/01/2014    SODIUM 139 07/25/2023    K 3.9 07/25/2023     07/25/2023    CO2 38 (H) 07/25/2023    ANIONGAP 0 (L) 03/01/2014    AGAP 1 07/25/2023    BUN 15 07/25/2023    CREATININE 0.81 07/25/2023    GLUC 109 07/25/2023    GLUF 109 (H) 06/12/2020    CALCIUM 8.5 07/25/2023    AST 20 07/22/2023    ALT 11 07/22/2023    ALKPHOS 69 07/22/2023    TP 7.7 07/22/2023    TBILI 0.81 07/22/2023    EGFR 81 07/25/2023     Reviewed CT abd/pelvis in 7/2023. 10 lb weight loss since 7/2023.

## 2023-09-28 NOTE — TELEPHONE ENCOUNTER
Patient notify PCP must be switch, insurance must be call today to reflex visit today PCP change.  Patient agree

## 2023-09-29 LAB
FLUAV RNA RESP QL NAA+PROBE: NEGATIVE
FLUBV RNA RESP QL NAA+PROBE: NEGATIVE
SARS-COV-2 RNA RESP QL NAA+PROBE: NEGATIVE

## 2023-10-10 ENCOUNTER — HOSPITAL ENCOUNTER (EMERGENCY)
Facility: HOSPITAL | Age: 88
Discharge: HOME | End: 2023-10-10
Attending: EMERGENCY MEDICINE
Payer: MEDICARE

## 2023-10-10 ENCOUNTER — APPOINTMENT (EMERGENCY)
Dept: RADIOLOGY | Facility: HOSPITAL | Age: 88
End: 2023-10-10
Payer: MEDICARE

## 2023-10-10 VITALS
OXYGEN SATURATION: 97 % | HEART RATE: 77 BPM | SYSTOLIC BLOOD PRESSURE: 183 MMHG | TEMPERATURE: 98.3 F | RESPIRATION RATE: 18 BRPM | DIASTOLIC BLOOD PRESSURE: 82 MMHG

## 2023-10-10 DIAGNOSIS — J44.1 COPD WITH ACUTE EXACERBATION (HCC): Primary | ICD-10-CM

## 2023-10-10 LAB
2HR DELTA HS TROPONIN: 0 NG/L
ANION GAP SERPL CALCULATED.3IONS-SCNC: 5 MMOL/L
ATRIAL RATE: 76 BPM
ATRIAL RATE: 82 BPM
BASOPHILS # BLD AUTO: 0.02 THOUSANDS/ÂΜL (ref 0–0.1)
BASOPHILS NFR BLD AUTO: 0 % (ref 0–1)
BNP SERPL-MCNC: 79 PG/ML (ref 0–100)
BUN SERPL-MCNC: 21 MG/DL (ref 5–25)
CALCIUM SERPL-MCNC: 9.7 MG/DL (ref 8.4–10.2)
CARDIAC TROPONIN I PNL SERPL HS: 11 NG/L
CARDIAC TROPONIN I PNL SERPL HS: 11 NG/L
CHLORIDE SERPL-SCNC: 96 MMOL/L (ref 96–108)
CO2 SERPL-SCNC: 40 MMOL/L (ref 21–32)
CREAT SERPL-MCNC: 0.93 MG/DL (ref 0.6–1.3)
EOSINOPHIL # BLD AUTO: 0.05 THOUSAND/ÂΜL (ref 0–0.61)
EOSINOPHIL NFR BLD AUTO: 1 % (ref 0–6)
ERYTHROCYTE [DISTWIDTH] IN BLOOD BY AUTOMATED COUNT: 13.1 % (ref 11.6–15.1)
FLUAV RNA RESP QL NAA+PROBE: NEGATIVE
FLUBV RNA RESP QL NAA+PROBE: NEGATIVE
GFR SERPL CREATININE-BSD FRML MDRD: 74 ML/MIN/1.73SQ M
GLUCOSE SERPL-MCNC: 196 MG/DL (ref 65–140)
HCT VFR BLD AUTO: 45.4 % (ref 36.5–49.3)
HGB BLD-MCNC: 13 G/DL (ref 12–17)
IMM GRANULOCYTES # BLD AUTO: 0.01 THOUSAND/UL (ref 0–0.2)
IMM GRANULOCYTES NFR BLD AUTO: 0 % (ref 0–2)
LYMPHOCYTES # BLD AUTO: 1.1 THOUSANDS/ÂΜL (ref 0.6–4.47)
LYMPHOCYTES NFR BLD AUTO: 24 % (ref 14–44)
MCH RBC QN AUTO: 30.2 PG (ref 26.8–34.3)
MCHC RBC AUTO-ENTMCNC: 28.6 G/DL (ref 31.4–37.4)
MCV RBC AUTO: 105 FL (ref 82–98)
MONOCYTES # BLD AUTO: 0.22 THOUSAND/ÂΜL (ref 0.17–1.22)
MONOCYTES NFR BLD AUTO: 5 % (ref 4–12)
NEUTROPHILS # BLD AUTO: 3.21 THOUSANDS/ÂΜL (ref 1.85–7.62)
NEUTS SEG NFR BLD AUTO: 70 % (ref 43–75)
NRBC BLD AUTO-RTO: 0 /100 WBCS
P AXIS: 72 DEGREES
P AXIS: 85 DEGREES
PLATELET # BLD AUTO: 126 THOUSANDS/UL (ref 149–390)
PMV BLD AUTO: 9 FL (ref 8.9–12.7)
POTASSIUM SERPL-SCNC: 4 MMOL/L (ref 3.5–5.3)
PR INTERVAL: 144 MS
PR INTERVAL: 156 MS
QRS AXIS: 74 DEGREES
QRS AXIS: 79 DEGREES
QRSD INTERVAL: 88 MS
QRSD INTERVAL: 90 MS
QT INTERVAL: 358 MS
QT INTERVAL: 384 MS
QTC INTERVAL: 418 MS
QTC INTERVAL: 432 MS
RBC # BLD AUTO: 4.31 MILLION/UL (ref 3.88–5.62)
RSV RNA RESP QL NAA+PROBE: NEGATIVE
SARS-COV-2 RNA RESP QL NAA+PROBE: NEGATIVE
SODIUM SERPL-SCNC: 141 MMOL/L (ref 135–147)
T WAVE AXIS: 73 DEGREES
T WAVE AXIS: 81 DEGREES
VENTRICULAR RATE: 76 BPM
VENTRICULAR RATE: 82 BPM
WBC # BLD AUTO: 4.61 THOUSAND/UL (ref 4.31–10.16)

## 2023-10-10 PROCEDURE — 94640 AIRWAY INHALATION TREATMENT: CPT

## 2023-10-10 PROCEDURE — 93005 ELECTROCARDIOGRAM TRACING: CPT

## 2023-10-10 PROCEDURE — 36415 COLL VENOUS BLD VENIPUNCTURE: CPT | Performed by: EMERGENCY MEDICINE

## 2023-10-10 PROCEDURE — 71045 X-RAY EXAM CHEST 1 VIEW: CPT

## 2023-10-10 PROCEDURE — 93010 ELECTROCARDIOGRAM REPORT: CPT

## 2023-10-10 PROCEDURE — 84484 ASSAY OF TROPONIN QUANT: CPT | Performed by: EMERGENCY MEDICINE

## 2023-10-10 PROCEDURE — 80048 BASIC METABOLIC PNL TOTAL CA: CPT | Performed by: EMERGENCY MEDICINE

## 2023-10-10 PROCEDURE — 0241U HB NFCT DS VIR RESP RNA 4 TRGT: CPT | Performed by: EMERGENCY MEDICINE

## 2023-10-10 PROCEDURE — 85025 COMPLETE CBC W/AUTO DIFF WBC: CPT | Performed by: EMERGENCY MEDICINE

## 2023-10-10 PROCEDURE — 83880 ASSAY OF NATRIURETIC PEPTIDE: CPT | Performed by: EMERGENCY MEDICINE

## 2023-10-10 PROCEDURE — 93010 ELECTROCARDIOGRAM REPORT: CPT | Performed by: INTERNAL MEDICINE

## 2023-10-10 PROCEDURE — 99285 EMERGENCY DEPT VISIT HI MDM: CPT | Performed by: EMERGENCY MEDICINE

## 2023-10-10 PROCEDURE — 99285 EMERGENCY DEPT VISIT HI MDM: CPT

## 2023-10-10 RX ORDER — AZITHROMYCIN 250 MG/1
TABLET, FILM COATED ORAL
Qty: 6 TABLET | Refills: 0 | Status: SHIPPED | OUTPATIENT
Start: 2023-10-10 | End: 2023-10-14

## 2023-10-10 RX ORDER — ALBUTEROL SULFATE 2.5 MG/3ML
5 SOLUTION RESPIRATORY (INHALATION) ONCE
Status: COMPLETED | OUTPATIENT
Start: 2023-10-10 | End: 2023-10-10

## 2023-10-10 RX ADMIN — ALBUTEROL SULFATE 5 MG: 2.5 SOLUTION RESPIRATORY (INHALATION) at 18:27

## 2023-10-10 RX ADMIN — IPRATROPIUM BROMIDE 0.5 MG: 0.5 SOLUTION RESPIRATORY (INHALATION) at 18:28

## 2023-10-10 NOTE — ED PROVIDER NOTES
History  Chief Complaint   Patient presents with   • Shortness of Breath     Per family, pt experiencing increased sob over the past 2 days, was sick previously approx 2wks ago and given abx. Pt wears 3L n/c at home, up to 4L upon arrival and now on 6L during triage. O2 was 84% at 4L upon arrival     Sick a couple of weeks ago w/ URI symptoms - seen by [facility] and placed on abx and increased baseline steroids. Felt better, but started to feel worse again a couple of days ago. C/o subjective fever, chills, increased cough/mucus production, arthralgia/myalgias. Appetite normal, no n/v/d, no change in chronic LE edema. Taking home meds w/o improvement - last albuterol was this am.      Noted to be hypoxic on his baseline oxygen upon arrival - family notes his portable tank malfunctions/overheats but has no problem with his oxygen concentrator at home    Wt Readings from Last 3 Encounters:  09/28/23 : 57.6 kg (127 lb)  08/15/23 : 57.7 kg (127 lb 3.2 oz)  08/08/23 : 60.1 kg (132 lb 6.4 oz)        History provided by:  Patient, relative, spouse and medical records   used: Yes (son & daughter in law)    Shortness of Breath      Prior to Admission Medications   Prescriptions Last Dose Informant Patient Reported? Taking? Trelegy Ellipta 200-62.5-25 MCG/ACT AEPB inhaler   Yes Yes   Sig: INHALE 1 PUFF DAILY RINSE MOUTH AFTER USE.   albuterol (2.5 mg/3 mL) 0.083 % nebulizer solution   No Yes   Sig: TAKE 3 ML (2.5 MG TOTAL) BY NEBULIZATION EVERY 6 (SIX) HOURS AS NEEDED FOR WHEEZING OR SHORTNESS OF BREATH   albuterol (PROVENTIL HFA,VENTOLIN HFA) 90 mcg/act inhaler   No Yes   Sig: INHALE 2 PUFFS EVERY 6 (SIX) HOURS AS NEEDED FOR WHEEZING   brimonidine tartrate 0.2 % ophthalmic solution   Yes Yes   budesonide (Pulmicort) 0.5 mg/2 mL nebulizer solution   No Yes   Sig: Take 2 mL (0.5 mg total) by nebulization 2 (two) times a day Rinse mouth after use.    formoterol (PERFOROMIST) 20 MCG/2ML nebulizer solution   No Yes   Sig: Take 2 mL (20 mcg total) by nebulization 2 (two) times a day   hydroxypropyl methylcellulose (GONAK) 2.5 % ophthalmic solution   Yes Yes   Sig: Apply 1 drop to eye Three times a day   losartan (COZAAR) 25 mg tablet   No Yes   Sig: Take 1 tablet (25 mg total) by mouth daily   pantoprazole (PROTONIX) 40 mg tablet   No Yes   Sig: TAKE 1 TABLET (40 MG TOTAL) BY MOUTH DAILY   predniSONE 10 mg tablet   No Yes   Sig: Take 4 tablets (40 mg total) by mouth daily for 3 days, THEN 3 tablets (30 mg total) daily for 3 days, THEN 2 tablets (20 mg total) daily for 3 days, THEN 1 tablet (10 mg total) daily for 3 days. 4 tabs daily for 3 days,  3 tabs daily for 3 days, 2 tabs daily for 3 days, 1 tab daily for 3 days. predniSONE 5 mg tablet   No No   Sig: Take 1 tablet (5 mg total) by mouth daily      Facility-Administered Medications: None       Past Medical History:   Diagnosis Date   • Acute metabolic encephalopathy 17/41/1028   • Acute on chronic respiratory failure ([address]) 12/4/2022   • Age-related cataract of right eye 4/4/2023    patient is medically cleared and presents with low risk of complication with this upcoming R cataract surgery.    • Anemia    • Aneurysm, aorta, thoracic (HCC)    • Appendicolith    • Ascending aortic aneurysm (HCC)     3.7   • Asthma    • BPH (benign prostatic hyperplasia)    • CAD (coronary artery disease)     noted on CT scan   • CHF (congestive heart failure) (HCC)    • COPD (chronic obstructive pulmonary disease) (HCC)    • Descending thoracic aortic aneurysm (HCC)    • Diabetes mellitus ([address])    • Diverticulosis    • Former tobacco use    • GERD (gastroesophageal reflux disease)    • History of DVT (deep vein thrombosis)     Left leg   • History of transfusion    • Hypertension    • Inguinal hernia     right   • Nephrolithiasis    • Oxygen dependent     2LNC   • Oxygen dependent    • Pneumonia    • Pre-diabetes    • Prostate calculus    • PVD (peripheral vascular disease) ([address])    • Recurrent right inguinal hernia w incarcertion [id number]   • Small bowel obstruction ([address]) 2022   • Thoracic aortic aneurysm without rupture ([address]) 2018    Added automatically from request for surgery [id number]   • Thrombocytopenia ([address]) 2018   • Ulcer    • 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[address])    • Varicose vein of leg     b/l       Past Surgical History:   Procedure Laterality Date   • CARDIAC SURGERY     • ESOPHAGOGASTRODUODENOSCOPY     • HERNIA REPAIR Right 2019    Procedure: REPAIR HERNIA INGUINAL WITH MESH;  Surgeon: Leonor Carrion MD;  Location: [address] [place] [address] MAIN OR;  Service: General   • HERNIA REPAIR Right 2023    Procedure: REPAIR RECURRENT INCARERATED HERNIA INGUINAL OPEN, RIGHT ORCHIECTOMY;  Surgeon: Misael Brown MD;  Location: [facility] Main OR;  Service: General   • INGUINAL HERNIA REPAIR Bilateral    • IR TEVAR  2018   • DC EVASC RPR DTA COVERAGE ART ORIGIN 1ST ENDOPROSTH N/A 2018    Procedure: TEVAR - endovascular thoracic aortic aneurysm repair;  Surgeon: Linda Ortega MD;  Location:  MAIN OR;  Service: Vascular   • THORACIC AORTIC ANEURYSM REPAIR  2018   • VARICOSE VEIN SURGERY Bilateral     vein stripping       Family History   Problem Relation Age of Onset   • Tuberculosis Mother    • No Known Problems Father    • Cancer Sister    • Diabetes Family    • Hypertension Family      I have reviewed and agree with the history as documented. E-Cigarette/Vaping   • E-Cigarette Use Never User      E-Cigarette/Vaping Substances     Social History     Tobacco Use   • Smoking status: Former     Packs/day: 1.00     Years: 35.00     Total pack years: 35.00     Types: Cigarettes     Start date:      Quit date:      Years since quittin.7   • Smokeless tobacco: Never   Vaping Use   • Vaping Use: Never used   Substance Use Topics   • Alcohol use: Never   • Drug use: No       Review of Systems   Respiratory: Positive for shortness of breath.     All other systems reviewed and are negative. Physical Exam  Physical Exam  Vitals and nursing note reviewed. Constitutional:       General: He is not in acute distress. Appearance: Normal appearance. He is not ill-appearing, toxic-appearing or diaphoretic. HENT:      Nose: Nose normal.      Mouth/Throat:      Mouth: Mucous membranes are moist.   Eyes:      Conjunctiva/sclera: Conjunctivae normal.   Cardiovascular:      Rate and Rhythm: Normal rate and regular rhythm. Pulmonary:      Effort: No respiratory distress. Breath sounds: Decreased breath sounds and wheezing present. No rhonchi or rales. Abdominal:      Palpations: Abdomen is soft. Musculoskeletal:         General: No tenderness. Cervical back: Normal range of motion. Right lower leg: Edema present. Left lower leg: Edema present. Comments: Trace b/l peripheral edema   Skin:     General: Skin is warm. Findings: No erythema. Neurological:      General: No focal deficit present. Mental Status: He is alert and oriented to person, place, and time.    Psychiatric:         Mood and Affect: Mood normal.         Vital Signs  ED Triage Vitals   Temperature Pulse Respirations Blood Pressure SpO2   10/10/23 1745 10/10/23 1744 10/10/23 1744 10/10/23 1744 10/10/23 1744   98.3 °F (36.8 °C) 86 19 (!) 216/98 96 %      Temp Source Heart Rate Source Patient Position - Orthostatic VS BP Location FiO2 (%)   10/10/23 1744 10/10/23 1744 10/10/23 1744 10/10/23 1744 --   Oral Monitor Sitting Right arm       Pain Score       --                  Vitals:    10/10/23 1744 10/10/23 1952 10/10/23 2038   BP: (!) 216/98 (!) 179/86 (!) 183/82   Pulse: 86 76 77   Patient Position - Orthostatic VS: Sitting Lying Lying         Visual Acuity      ED Medications  Medications   albuterol inhalation solution 5 mg (5 mg Nebulization Given 10/10/23 1827)   ipratropium (ATROVENT) 0.02 % inhalation solution 0.5 mg (0.5 mg Nebulization Given 10/10/23 1828)       Diagnostic Studies  Results Reviewed     Procedure Component Value Units Date/Time    HS Troponin I 2hr [[id number]]  (Normal) Collected: 10/10/23 1951    Lab Status: Final result Specimen: Blood from Arm, Left Updated: 10/10/23 2020     hs TnI 2hr 11 ng/L      Delta 2hr hsTnI 0 ng/L     HS Troponin I 4hr [[id number]]     Lab Status: No result Specimen: Blood     FLU/RSV/COVID - if FLU/RSV clinically relevant [[id number]]  (Normal) Collected: 10/10/23 1826    Lab Status: Final result Specimen: Nares from Nose Updated: 10/10/23 1914     SARS-CoV-2 Negative     INFLUENZA A PCR Negative     INFLUENZA B PCR Negative     RSV PCR Negative    Narrative:      FOR PEDIATRIC PATIENTS - copy/paste COVID Guidelines URL to browser: [email or web address]    SARS-CoV-2 assay is a Nucleic Acid Amplification assay intended for the  qualitative detection of nucleic acid from SARS-CoV-2 in nasopharyngeal  swabs. Results are for the presumptive identification of SARS-CoV-2 RNA. Positive results are indicative of infection with SARS-CoV-2, the virus  causing COVID-19, but do not rule out bacterial infection or co-infection  with other viruses. Laboratories within the [facility] and its  territories are required to report all positive results to the appropriate  public health authorities. Negative results do not preclude SARS-CoV-2  infection and should not be used as the sole basis for treatment or other  patient management decisions. Negative results must be combined with  clinical observations, patient history, and epidemiological information. This test has not been FDA cleared or approved. This test has been authorized by FDA under an Emergency Use Authorization  (EUA).  This test is only authorized for the duration of time the  declaration that circumstances exist justifying the authorization of the  emergency use of an in vitro diagnostic tests for detection of SARS-CoV-2  virus and/or diagnosis of COVID-19 infection under section 564(b)(1) of  the Act, 21 U. S.C. 764LMN-6(N)(1), unless the authorization is terminated  or revoked sooner. The test has been validated but independent review by FDA  and CLIA is pending. Test performed using [employer] GeneXpert: This RT-PCR assay targets N2,  a region unique to SARS-CoV-2. A conserved region in the E-gene was chosen  for pan-Sarbecovirus detection which includes SARS-CoV-2. According to CMS-2020-01-R, this platform meets the definition of high-throughput technology.     HS Troponin 0hr (reflex protocol) [[id number]]  (Normal) Collected: 10/10/23 1752    Lab Status: Final result Specimen: Blood from Arm, Left Updated: 10/10/23 1837     hs TnI 0hr 11 ng/L     B-Type Natriuretic Peptide(BNP) [[id number]]  (Normal) Collected: 10/10/23 1752    Lab Status: Final result Specimen: Blood from Arm, Left Updated: 10/10/23 1831     BNP 79 pg/mL     Basic metabolic panel [[id number]]  (Abnormal) Collected: 10/10/23 1752    Lab Status: Final result Specimen: Blood from Arm, Left Updated: 10/10/23 1821     Sodium 141 mmol/L      Potassium 4.0 mmol/L      Chloride 96 mmol/L      CO2 40 mmol/L      ANION GAP 5 mmol/L      BUN 21 mg/dL      Creatinine 0.93 mg/dL      Glucose 196 mg/dL      Calcium 9.7 mg/dL      eGFR 74 ml/min/1.73sq m     Narrative:      [place] guidelines for Chronic Kidney Disease (CKD):   •  Stage 1 with normal or high GFR (GFR > 90 mL/min/1.73 square meters)  •  Stage 2 Mild CKD (GFR = 60-89 mL/min/1.73 square meters)  •  Stage 3A Moderate CKD (GFR = 45-59 mL/min/1.73 square meters)  •  Stage 3B Moderate CKD (GFR = 30-44 mL/min/1.73 square meters)  •  Stage 4 Severe CKD (GFR = 15-29 mL/min/1.73 square meters)  •  Stage 5 End Stage CKD (GFR <15 mL/min/1.73 square meters)  Note: GFR calculation is accurate only with a steady state creatinine    CBC and differential [[id number]]  (Abnormal) Collected: 10/10/23 1752    Lab Status: Final result Specimen: Blood from Arm, Left Updated: 10/10/23 1806     WBC 4.61 Thousand/uL      RBC 4.31 Million/uL      Hemoglobin 13.0 g/dL      Hematocrit 45.4 %       fL      MCH 30.2 pg      MCHC 28.6 g/dL      RDW 13.1 %      MPV 9.0 fL      Platelets 711 Thousands/uL      nRBC 0 /100 WBCs      Neutrophils Relative 70 %      Immat GRANS % 0 %      Lymphocytes Relative 24 %      Monocytes Relative 5 %      Eosinophils Relative 1 %      Basophils Relative 0 %      Neutrophils Absolute 3.21 Thousands/µL      Immature Grans Absolute 0.01 Thousand/uL      Lymphocytes Absolute 1.10 Thousands/µL      Monocytes Absolute 0.22 Thousand/µL      Eosinophils Absolute 0.05 Thousand/µL      Basophils Absolute 0.02 Thousands/µL                  XR chest 1 view portable   ED Interpretation by Desean Morel DO (10/10 1856)   Xray reviewed and independently interpreted by me: no acute findings                   Procedures  ECG 12 Lead Documentation Only    Date/Time: 10/10/2023 5:57 PM    Performed by: Desean Morel DO  Authorized by: Desean Morel DO    Indications / Diagnosis:  Dyspnea  ECG reviewed by me, the ED Provider: yes    Patient location:  ED  Previous ECG:     Previous ECG:  Compared to current    Similarity:  No change  Interpretation:     Interpretation: non-specific    Quality:     Tracing quality:  Limited by artifact  Rate:     ECG rate:  82    ECG rate assessment: normal    Rhythm:     Rhythm: sinus rhythm    Ectopy:     Ectopy: none    QRS:     QRS axis:  Normal  ST segments:     ST segments:  Non-specific  T waves:     T waves: non-specific               ED Course  ED Course as of 10/10/23 2104   Tue Oct 10, 2023   1850 BNP: 78  75 two months ago   1954 Improved air movement, no focal infiltrates on xray    COVID/Flu negative    Will have him continue w/ prednisone, give rx for zpack, instructed to increase his albuterol nebulizer to every 4 hours while taking abx before going back to previous schedule and f/u w/ his doctor   2034 Results/fu d/w pt and wife via  [id number]                               SBIRT 20yo+    Flowsheet Row Most Recent Value   Initial Alcohol Screen: US AUDIT-C     1. How often do you have a drink containing alcohol? 0 Filed at: 10/10/2023 1743   2. How many drinks containing alcohol do you have on a typical day you are drinking? 0 Filed at: 10/10/2023 1743   3b. FEMALE Any Age, or MALE 65+: How often do you have 4 or more drinks on one occassion? 0 Filed at: 10/10/2023 1743   Audit-C Score 0 Filed at: 10/10/2023 1743   ALAN: How many times in the past year have you. .. Used an illegal drug or used a prescription medication for non-medical reasons? Never Filed at: 10/10/2023 1743                    Medical Decision Making  Will get EKG to r/o arrhythmia, ischemic changes. Will get labs to r/o acute life threatening metabolic abnl, cardiac ischemia, significant anemia, will ck BNP for evidence of sig volume overload. Will get viral swab to r/o covid/flu. Will get CXR to r/o occult pna, ptx. Will give albuterol/atrovent nebulizer tx and re-eval      COPD with acute exacerbation ([address]): acute illness or injury that poses a threat to life or bodily functions     Details: no evidence of acute life threatening arrhythmia, cardiac ischemia, pneumothorax or pneumonia  Amount and/or Complexity of Data Reviewed  Independent Historian: spouse     Details: some hx from son  Labs: ordered. Decision-making details documented in ED Course. Radiology: ordered and independent interpretation performed. ECG/medicine tests: ordered and independent interpretation performed. Risk  Prescription drug management.                Disposition  Final diagnoses:   COPD with acute exacerbation ([address])     Time reflects when diagnosis was documented in both MDM as applicable and the Disposition within this note     Time User Action Codes Description Comment 10/10/2023  7:56 PM Kristel Magana Add [J44.1] COPD with acute exacerbation [facility])       ED Disposition     ED Disposition   Discharge    Condition   Stable    Date/Time   Tue Oct 10, 2023  8:01 PM    Comment   Tyrell Riley discharge to home/self care. Follow-up Information     Follow up With Specialties Details Why Contact Info    Andreas Grubbs MD [facility] Schedule an appointment as soon as possible for a visit on 10/13/2023 for further evaluation and treatment [address] ,[place] [phone]            Patient's Medications   Discharge Prescriptions    AZITHROMYCIN (ZITHROMAX) 250 MG TABLET    Take 2 tablets today then 1 tablet daily x 4 days       Start Date: 10/10/2023End Date: 10/14/2023       Order Dose: --       Quantity: 6 tablet    Refills: 0       No discharge procedures on file.     PDMP Review       Value Time User    PDMP Reviewed  Yes 7/28/2023 10:27 AM Andreas Grubbs MD          ED Provider  Electronically Signed by           Imer Enriquez DO  10/10/23 210

## 2023-10-11 NOTE — DISCHARGE INSTRUCTIONS
Mientras hanh antibióticos, aumente rosa nebulizador de albuterol a 3 ml cada cuatro horas (mientras esté despierto). Angle vez que haya terminado con los antibióticos, puede volver a usar el nebulizador de albuterol cada seis horas según sea necesario para las sibilancias. Dhiraj un seguimiento con rosa médico el viernes para angle reevaluación.

## 2023-10-12 DIAGNOSIS — J44.9 CHRONIC OBSTRUCTIVE PULMONARY DISEASE, UNSPECIFIED COPD TYPE (HCC): ICD-10-CM

## 2023-10-12 RX ORDER — ALBUTEROL SULFATE 2.5 MG/3ML
2.5 SOLUTION RESPIRATORY (INHALATION) EVERY 6 HOURS PRN
Qty: 75 ML | Refills: 3 | Status: SHIPPED | OUTPATIENT
Start: 2023-10-12

## 2023-10-25 ENCOUNTER — OFFICE VISIT (OUTPATIENT)
Dept: FAMILY MEDICINE CLINIC | Facility: CLINIC | Age: 88
End: 2023-10-25
Payer: MEDICARE

## 2023-10-25 VITALS
WEIGHT: 137.2 LBS | SYSTOLIC BLOOD PRESSURE: 170 MMHG | HEIGHT: 63 IN | BODY MASS INDEX: 24.31 KG/M2 | TEMPERATURE: 97.9 F | DIASTOLIC BLOOD PRESSURE: 100 MMHG | RESPIRATION RATE: 17 BRPM | HEART RATE: 78 BPM | OXYGEN SATURATION: 96 %

## 2023-10-25 DIAGNOSIS — E78.2 MIXED HYPERLIPIDEMIA: ICD-10-CM

## 2023-10-25 DIAGNOSIS — J96.11 CHRONIC RESPIRATORY FAILURE WITH HYPOXIA (HCC): ICD-10-CM

## 2023-10-25 DIAGNOSIS — H91.93 BILATERAL HEARING LOSS, UNSPECIFIED HEARING LOSS TYPE: ICD-10-CM

## 2023-10-25 DIAGNOSIS — E55.9 VITAMIN D DEFICIENCY: ICD-10-CM

## 2023-10-25 DIAGNOSIS — E53.8 VITAMIN B12 DEFICIENCY: ICD-10-CM

## 2023-10-25 DIAGNOSIS — J44.9 CHRONIC OBSTRUCTIVE PULMONARY DISEASE, UNSPECIFIED COPD TYPE (HCC): ICD-10-CM

## 2023-10-25 DIAGNOSIS — I10 BENIGN ESSENTIAL HYPERTENSION: ICD-10-CM

## 2023-10-25 DIAGNOSIS — I50.32 CHRONIC DIASTOLIC CHF (CONGESTIVE HEART FAILURE) (HCC): ICD-10-CM

## 2023-10-25 DIAGNOSIS — R73.03 PREDIABETES: ICD-10-CM

## 2023-10-25 DIAGNOSIS — Z00.00 MEDICARE ANNUAL WELLNESS VISIT, SUBSEQUENT: Primary | ICD-10-CM

## 2023-10-25 PROCEDURE — G0439 PPPS, SUBSEQ VISIT: HCPCS | Performed by: PHYSICIAN ASSISTANT

## 2023-10-25 RX ORDER — MONTELUKAST SODIUM 10 MG/1
10 TABLET ORAL
Qty: 90 TABLET | Refills: 0 | Status: SHIPPED | OUTPATIENT
Start: 2023-10-25

## 2023-10-25 RX ORDER — LOSARTAN POTASSIUM 25 MG/1
25 TABLET ORAL 2 TIMES DAILY
Qty: 180 TABLET | Refills: 1 | Status: SHIPPED | OUTPATIENT
Start: 2023-10-25

## 2023-10-25 NOTE — ASSESSMENT & PLAN NOTE
Baseline O2 requirement 2 to 3 L at rest in the home, he uses 4 L on exertion outside the home. Stable on 3L with O2 saturation 95-96% today in office.

## 2023-10-25 NOTE — ASSESSMENT & PLAN NOTE
Follow-up pulm as scheduled in 11/2023. Patient to complete PFTs. Switched to nebulizer per pulm due to difficulty with inhaler use. Currently on prednisone 5mg daily per Dr. Miles Cedeño for palliative measure. Start montelukast 5mg daily due to recent complaints of sneezing and nasal congestion.

## 2023-10-25 NOTE — PATIENT INSTRUCTIONS
Medicare Preventive Visit Patient Instructions  Thank you for completing your Welcome to Medicare Visit or Medicare Annual Wellness Visit today. Your next wellness visit will be due in one year (10/25/2024). The screening/preventive services that you may require over the next 5-10 years are detailed below. Some tests may not apply to you based off risk factors and/or age. Screening tests ordered at today's visit but not completed yet may show as past due. Also, please note that scanned in results may not display below. Preventive Screenings:  Service Recommendations Previous Testing/Comments   Colorectal Cancer Screening  Colonoscopy    Fecal Occult Blood Test (FOBT)/Fecal Immunochemical Test (FIT)  Fecal DNA/Cologuard Test  Flexible Sigmoidoscopy Age: 43-73 years old   Colonoscopy: every 10 years (May be performed more frequently if at higher risk)  OR  FOBT/FIT: every 1 year  OR  Cologuard: every 3 years  OR  Sigmoidoscopy: every 5 years  Screening may be recommended earlier than age 39 if at higher risk for colorectal cancer. Also, an individualized decision between you and your healthcare provider will decide whether screening between the ages of 77-80 would be appropriate.  Colonoscopy: Not on file  FOBT/FIT: Not on file  Cologuard: Not on file  Sigmoidoscopy: Not on file    Screening Not Indicated     Prostate Cancer Screening Individualized decision between patient and health care provider in men between ages of 53-66   Medicare will cover every 12 months beginning on the day after your 50th birthday PSA: No results in last 5 years     Screening Not Indicated     Hepatitis C Screening Once for adults born between 1945 and 1965  More frequently in patients at high risk for Hepatitis C Hep C Antibody: Not on file        Diabetes Screening 1-2 times per year if you're at risk for diabetes or have pre-diabetes Fasting glucose: 109 mg/dL (6/12/2020)  A1C: 6.2 (12/9/2022)  Screening Not Indicated  History Diabetes Cholesterol Screening Once every 5 years if you don't have a lipid disorder. May order more often based on risk factors. Lipid panel: 03/14/2022  Screening Not Indicated  History Lipid Disorder      Other Preventive Screenings Covered by Medicare:  Abdominal Aortic Aneurysm (AAA) Screening: covered once if your at risk. You're considered to be at risk if you have a family history of AAA or a male between the age of 70-76 who smoking at least 100 cigarettes in your lifetime. Lung Cancer Screening: covers low dose CT scan once per year if you meet all of the following conditions: (1) Age 48-67; (2) No signs or symptoms of lung cancer; (3) Current smoker or have quit smoking within the last 15 years; (4) You have a tobacco smoking history of at least 20 pack years (packs per day x number of years you smoked); (5) You get a written order from a healthcare provider. Glaucoma Screening: covered annually if you're considered high risk: (1) You have diabetes OR (2) Family history of glaucoma OR (3)  aged 48 and older OR (3)  American aged 72 and older  Osteoporosis Screening: covered every 2 years if you meet one of the following conditions: (1) Have a vertebral abnormality; (2) On glucocorticoid therapy for more than 3 months; (3) Have primary hyperparathyroidism; (4) On osteoporosis medications and need to assess response to drug therapy. HIV Screening: covered annually if you're between the age of 14-79. Also covered annually if you are younger than 13 and older than 72 with risk factors for HIV infection. For pregnant patients, it is covered up to 3 times per pregnancy.     Immunizations:  Immunization Recommendations   Influenza Vaccine Annual influenza vaccination during flu season is recommended for all persons aged >= 6 months who do not have contraindications   Pneumococcal Vaccine   * Pneumococcal conjugate vaccine = PCV13 (Prevnar 13), PCV15 (Vaxneuvance), PCV20 (Prevnar 20)  * Pneumococcal polysaccharide vaccine = PPSV23 (Pneumovax) Adults 17-69 yo with certain risk factors or if 69+ yo  If never received any pneumonia vaccine: recommend Prevnar 20 (PCV20)  Give PCV20 if previously received 1 dose of PCV13 or PPSV23   Hepatitis B Vaccine 3 dose series if at intermediate or high risk (ex: diabetes, end stage renal disease, liver disease)   Respiratory syncytial virus (RSV) Vaccine - COVERED BY MEDICARE PART D  * RSVPreF3 (Arexvy) CDC recommends that adults 61years of age and older may receive a single dose of RSV vaccine using shared clinical decision-making (SCDM)   Tetanus (Td) Vaccine - COST NOT COVERED BY MEDICARE PART B Following completion of primary series, a booster dose should be given every 10 years to maintain immunity against tetanus. Td may also be given as tetanus wound prophylaxis. Tdap Vaccine - COST NOT COVERED BY MEDICARE PART B Recommended at least once for all adults. For pregnant patients, recommended with each pregnancy. Shingles Vaccine (Shingrix) - COST NOT COVERED BY MEDICARE PART B  2 shot series recommended in those 19 years and older who have or will have weakened immune systems or those 50 years and older     Health Maintenance Due:  There are no preventive care reminders to display for this patient. Immunizations Due:      Topic Date Due   • COVID-19 Vaccine (3 - Pfizer series) 07/27/2021   • Influenza Vaccine (1) Never done     Advance Directives   What are advance directives? Advance directives are legal documents that state your wishes and plans for medical care. These plans are made ahead of time in case you lose your ability to make decisions for yourself. Advance directives can apply to any medical decision, such as the treatments you want, and if you want to donate organs. What are the types of advance directives? There are many types of advance directives, and each state has rules about how to use them.  You may choose a combination of any of the following:  Living will: This is a written record of the treatment you want. You can also choose which treatments you do not want, which to limit, and which to stop at a certain time. This includes surgery, medicine, IV fluid, and tube feedings. Durable power of  for healthcare [facility]): This is a written record that states who you want to make healthcare choices for you when you are unable to make them for yourself. This person, called a proxy, is usually a family member or a friend. You may choose more than 1 proxy. Do not resuscitate (DNR) order:  A DNR order is used in case your heart stops beating or you stop breathing. It is a request not to have certain forms of treatment, such as CPR. A DNR order may be included in other types of advance directives. Medical directive: This covers the care that you want if you are in a coma, near death, or unable to make decisions for yourself. You can list the treatments you want for each condition. Treatment may include pain medicine, surgery, blood transfusions, dialysis, IV or tube feedings, and a ventilator (breathing machine). Values history: This document has questions about your views, beliefs, and how you feel and think about life. This information can help others choose the care that you would choose. Why are advance directives important? An advance directive helps you control your care. Although spoken wishes may be used, it is better to have your wishes written down. Spoken wishes can be misunderstood, or not followed. Treatments may be given even if you do not want them. An advance directive may make it easier for your family to make difficult choices about your care. Fall Prevention    Fall prevention  includes ways to make your home and other areas safer. It also includes ways you can move more carefully to prevent a fall.  Health conditions that cause changes in your blood pressure, vision, or muscle strength and coordination may increase your risk for falls. Medicines may also increase your risk for falls if they make you dizzy, weak, or sleepy. Fall prevention tips:   Stand or sit up slowly. Use assistive devices as directed. Wear shoes that fit well and have soles that . Wear a personal alarm. Stay active. Manage your medical conditions. Home Safety Tips:  Add items to prevent falls in the bathroom. Keep paths clear. Install bright lights in your home. Keep items you use often on shelves within reach. Paint or place reflective tape on the edges of your stairs. © Copyright [employer] 2018 Information is for End User's use only and may not be sold, redistributed or otherwise used for commercial purposes.  All illustrations and images included in CareNotes® are the copyrighted property of [employer] or [address] [place] [address]

## 2023-10-25 NOTE — ASSESSMENT & PLAN NOTE
Lab Results   Component Value Date    HGBA1C 6.2 12/09/2022     Caution with use of chronic prednisone.

## 2023-10-25 NOTE — ASSESSMENT & PLAN NOTE
Wt Readings from Last 3 Encounters:   10/25/23 62.2 kg (137 lb 3.2 oz)   09/28/23 57.6 kg (127 lb)   08/15/23 57.7 kg (127 lb 3.2 oz)     No signs of acute fluid overload. Improved cough since last ED visit on 10/10/23 for suspected COPD exacerbation and completed 5 days of zpack. Echo in 4/2023 with 66% EF. Stable without diuretic. Monitor home weight if change 2 lb suddenly in 1 day. Lab Results   Component Value Date    BNP 79 10/10/2023      Reviewed chest XR done in 10/10/23 with Minimal bibasilar atelectasis.

## 2023-10-25 NOTE — ASSESSMENT & PLAN NOTE
Increase dose to 25mg BID of losartan. Home BP usually [id number] -[id number] systolic per wife. Continue monitor home BP.

## 2023-10-25 NOTE — PROGRESS NOTES
Assessment and Plan:     Problem List Items Addressed This Visit        Respiratory    Chronic obstructive pulmonary disease ([address])     Follow-up pulm as scheduled in 11/2023. Patient to complete PFTs. Switched to nebulizer per pulm due to difficulty with inhaler use. Currently on prednisone 5mg daily per Dr. Blade Lynch for palliative measure. Start montelukast 5mg daily due to recent complaints of sneezing and nasal congestion. Relevant Medications    montelukast (SINGULAIR) 10 mg tablet    Chronic respiratory failure with hypoxia (HCC)     Baseline O2 requirement 2 to 3 L at rest in the home, he uses 4 L on exertion outside the home. Stable on 3L with O2 saturation 95-96% today in office. Cardiovascular and Mediastinum    Benign essential hypertension     Increase dose to 25mg BID of losartan. Home BP usually 433A -617U systolic per wife. Continue monitor home BP. Relevant Medications    losartan (COZAAR) 25 mg tablet    Chronic diastolic CHF (congestive heart failure) (HCC)     Wt Readings from Last 3 Encounters:   10/25/23 62.2 kg (137 lb 3.2 oz)   09/28/23 57.6 kg (127 lb)   08/15/23 57.7 kg (127 lb 3.2 oz)     No signs of acute fluid overload. Improved cough since last ED visit on 10/10/23 for suspected COPD exacerbation and completed 5 days of zpack. Echo in 4/2023 with 66% EF. Stable without diuretic. Monitor home weight if change 2 lb suddenly in 1 day. Lab Results   Component Value Date    BNP 79 10/10/2023      Reviewed chest XR done in 10/10/23 with Minimal bibasilar atelectasis.                    Nervous and Auditory    Bilateral hearing loss    Relevant Orders    Ambulatory Referral to Otolaryngology       Other    Hyperlipidemia     Lab Results   Component Value Date    CHOLESTEROL 127 03/14/2022    CHOLESTEROL 184 06/13/2020    CHOLESTEROL 177 06/12/2020     Lab Results   Component Value Date    HDL 26 (L) 03/14/2022    HDL 48 06/13/2020    HDL 47 06/12/2020     Lab Results   Component Value Date    TRIG 84 03/14/2022    TRIG 81 06/13/2020    TRIG 65 06/12/2020     Lab Results   Component Value Date    [address] 130 06/12/2020    [address] 91 12/24/2018     Lab Results   Component Value Date    LDLCALC 84 03/14/2022              Relevant Orders    Lipid panel    Prediabetes     Lab Results   Component Value Date    HGBA1C 6.2 12/09/2022     Caution with use of chronic prednisone. Relevant Orders    HEMOGLOBIN A1C W/ EAG ESTIMATION    Vitamin B12 deficiency    Relevant Orders    Vitamin B12    Vitamin D deficiency    Relevant Orders    Vitamin D 25 hydroxy   Other Visit Diagnoses     Medicare annual wellness visit, subsequent    -  Primary          Depression Screening and Follow-up Plan: Patient was screened for depression during today's encounter. They screened negative with a PHQ-2 score of 2. Preventive health issues were discussed with patient, and age appropriate screening tests were ordered as noted in patient's After Visit Summary. Personalized health advice and appropriate referrals for health education or preventive services given if needed, as noted in patient's After Visit Summary. History of Present Illness:     Patient presents for a Medicare Wellness Visit    Juliana Berkowitz is a 82 y.o. male with a h/o COPD, former smoker, chronic hypoxia on home oxygen, CHF, recent cataract extraction with lens replacement in 4/2023, recent recurrent hernia repair due to incarceration and SBO in 7/2023 who presents for routine AWV. Since last visit, he went to ER and has had some improvement. No longer with significant cough. Mainly sneezing and runny nose. No fever or chills. Wife works but is able to manage taking him to appointments, not interested in having extra help in the home. Difficulty hearing. Using 2-3 L oxygen at home. BP is around 200M-423J systolic. No chest pain. No falls. Good appetite. No known weight loss or gain.      Wife present for exam and helped provide a portion of history in [place]iny[place]anda    History was conducted in Kinyarwanda without the use of . Patient Care Team:  Carlee Coles MD as PCP - General (Family Medicine)  [facility]  MD Sabina Bhakta MD     Review of Systems:     Review of Systems   Constitutional:  Negative for fever and unexpected weight change. HENT:  Positive for hearing loss, rhinorrhea and sneezing. Negative for ear pain, sinus pressure, sinus pain, sore throat, tinnitus and voice change. Respiratory:  Negative for cough (improved, resolved) and shortness of breath. Cardiovascular:  Negative for chest pain. Gastrointestinal:  Negative for abdominal pain, constipation and diarrhea. Musculoskeletal:  Positive for arthralgias and gait problem. Neurological:  Negative for dizziness, facial asymmetry and headaches. Psychiatric/Behavioral:  Negative for sleep disturbance and suicidal ideas.          Problem List:     Patient Active Problem List   Diagnosis   • Descending aortic aneurysm ([address])   • History of DVT (deep vein thrombosis)   • Benign essential hypertension   • Chronic respiratory failure with hypoxia (HCC)   • Varicose veins of both lower extremities with pain   • 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HCC)   • Chronic diastolic CHF (congestive heart failure) ([address])   • Acquired renal cyst of left kidney   • TIA (transient ischemic attack)   • Dysphagia   • 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)   • Hyperlipidemia   • Weight loss   • S/P hernia repair   • Supplemental oxygen dependent   • Chronic kidney disease, stage 3a ([address])   • Vitamin B12 deficiency   • Vitamin D deficiency   • Hypoxemia   • Chronic obstructive pulmonary disease ([address])   • PAC (premature atrial contraction)   • Prediabetes   • Bilateral hearing loss      Past Medical and Surgical History:     Past Medical History:   Diagnosis Date   • Acute metabolic encephalopathy 01/49/2097   • Acute on chronic respiratory failure ([address]) 12/4/2022   • Age-related cataract of right eye 4/4/2023    patient is medically cleared and presents with low risk of complication with this upcoming R cataract surgery.    • Anemia    • Aneurysm, aorta, thoracic (HCC)    • Appendicolith    • Ascending aortic aneurysm ([facility])     3.7   • Asthma    • BPH (benign prostatic hyperplasia)    • CAD (coronary artery disease)     noted on CT scan   • CHF (congestive heart failure) ([facility])    • COPD (chronic obstructive pulmonary disease) ([facility])    • Descending thoracic aortic aneurysm (HCC)    • Diabetes mellitus ([address])    • Diverticulosis    • Former tobacco use    • GERD (gastroesophageal reflux disease)    • History of DVT (deep vein thrombosis)     Left leg   • History of transfusion    • Hypertension    • Inguinal hernia     right   • Nephrolithiasis    • Oxygen dependent     2LNC   • Oxygen dependent    • Pneumonia    • Pre-diabetes    • Prostate calculus    • PVD (peripheral vascular disease) ([facility])    • Recurrent right inguinal hernia w incarcertion 7/5/8593   • Small bowel obstruction ([address]) 12/28/2022   • Thoracic aortic aneurysm without rupture ([address]) 11/19/2018    Added automatically from request for surgery [id number]   • Thrombocytopenia ([address]) 12/29/2018   • Ulcer    • 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[address])    • Varicose vein of leg     b/l     Past Surgical History:   Procedure Laterality Date   • CARDIAC SURGERY     • ESOPHAGOGASTRODUODENOSCOPY     • HERNIA REPAIR Right 1/21/2019    Procedure: REPAIR HERNIA INGUINAL WITH MESH;  Surgeon: Vincent Kearns MD;  Location: [address] [place] [address] MAIN OR;  Service: General   • HERNIA REPAIR Right 7/22/2023    Procedure: REPAIR RECURRENT INCARERATED HERNIA INGUINAL OPEN, RIGHT ORCHIECTOMY;  Surgeon: Alexsander Smiley MD;  Location: [facility] Main OR;  Service: General   • INGUINAL HERNIA REPAIR Bilateral    • IR TEVAR  12/27/2018   • OK EVASC RPR DTA COVERAGE ART ORIGIN 1ST ENDOPROSTH N/A 2018    Procedure: TEVAR - endovascular thoracic aortic aneurysm repair;  Surgeon: Frederick Bruno MD;  Location: [facility] MAIN OR;  Service: Vascular   • THORACIC AORTIC ANEURYSM REPAIR  2018   • VARICOSE VEIN SURGERY Bilateral     vein stripping      Family History:     Family History   Problem Relation Age of Onset   • Tuberculosis Mother    • No Known Problems Father    • Cancer Sister    • Diabetes Family    • Hypertension Family       Social History:     Social History     Socioeconomic History   • Marital status: /Civil Union     Spouse name: None   • Number of children: None   • Years of education: None   • Highest education level: None   Occupational History   • None   Tobacco Use   • Smoking status: Former     Packs/day: 1.00     Years: 35.00     Total pack years: 35.00     Types: Cigarettes     Start date:      Quit date:      Years since quittin.8   • Smokeless tobacco: Never   Vaping Use   • Vaping Use: Never used   Substance and Sexual Activity   • Alcohol use: Never   • Drug use: No   • Sexual activity: Never   Other Topics Concern   • None   Social History Narrative    ** Merged History Encounter **          Social Determinants of Health     Financial Resource Strain: High Risk (10/25/2023)    Overall Financial Resource Strain (CARDIA)    • Difficulty of Paying Living Expenses: Very hard   Food Insecurity: No Food Insecurity (2023)    Hunger Vital Sign    • Worried About Running Out of Food in the Last Year: Never true    • Ran Out of Food in the Last Year: Never true   Transportation Needs: Unmet Transportation Needs (10/25/2023)    PRAPARE - Transportation    • Lack of Transportation (Medical):  Yes    • Lack of Transportation (Non-Medical): Yes   Physical Activity: Not on file   Stress: Not on file   Social Connections: Not on file   Intimate Partner Violence: Not on file   Housing Stability: Low Risk  (2023)    Housing Stability Vital Sign    • Unable to Pay for Housing in the Last Year: No    • Number of Places Lived in the Last Year: 1    • Unstable Housing in the Last Year: No      Medications and Allergies:     Current Outpatient Medications   Medication Sig Dispense Refill   • albuterol (2.5 mg/3 mL) 0.083 % nebulizer solution TAKE 3 ML (2.5 MG TOTAL) BY NEBULIZATION EVERY 6 (SIX) HOURS AS NEEDED FOR WHEEZING OR SHORTNESS OF BREATH 75 mL 3   • albuterol (PROVENTIL HFA,VENTOLIN HFA) 90 mcg/act inhaler INHALE 2 PUFFS EVERY 6 (SIX) HOURS AS NEEDED FOR WHEEZING 18 g 5   • brimonidine tartrate 0.2 % ophthalmic solution      • formoterol (PERFOROMIST) 20 MCG/2ML nebulizer solution Take 2 mL (20 mcg total) by nebulization 2 (two) times a day 120 mL 30   • hydroxypropyl methylcellulose (GONAK) 2.5 % ophthalmic solution Apply 1 drop to eye Three times a day     • losartan (COZAAR) 25 mg tablet Take 1 tablet (25 mg total) by mouth 2 (two) times a day 180 tablet 1   • montelukast (SINGULAIR) 10 mg tablet Take 1 tablet (10 mg total) by mouth daily at bedtime Diana 1 tableta en la noche para las alergias 90 tablet 0   • pantoprazole (PROTONIX) 40 mg tablet TAKE 1 TABLET (40 MG TOTAL) BY MOUTH DAILY 30 tablet 5   • predniSONE 5 mg tablet Take 1 tablet (5 mg total) by mouth daily 30 tablet 0   • Trelegy Ellipta 200-62.5-25 MCG/ACT AEPB inhaler INHALE 1 PUFF DAILY RINSE MOUTH AFTER USE. • budesonide (Pulmicort) 0.5 mg/2 mL nebulizer solution Take 2 mL (0.5 mg total) by nebulization 2 (two) times a day Rinse mouth after use. 120 mL 5     No current facility-administered medications for this visit.      Allergies   Allergen Reactions   • Penicillins Hives, Itching and Rash   • Lisinopril Rash     Side pains and rash    • Zyprexa [Olanzapine] Tongue Swelling      Immunizations:     Immunization History   Administered Date(s) Administered   • COVID-19 PFIZER VACCINE 0.3 ML IM 05/07/2021, 06/01/2021   • Pneumococcal Conjugate 13-Valent 08/24/2016   • Pneumococcal Polysaccharide PPV23 11/16/2017, 08/02/2018      Health Maintenance: There are no preventive care reminders to display for this patient. Topic Date Due   • COVID-19 Vaccine (3 - Pfizer series) 07/27/2021   • Influenza Vaccine (1) Never done      Medicare Screening Tests and Risk Assessments:     Troy Knapp is here for his Subsequent Wellness visit. Last Medicare Wellness visit information reviewed, patient interviewed and updates made to the record today. Health Risk Assessment:   Patient rates overall health as fair. Patient feels that their physical health rating is same. Patient is satisfied with their life. Eyesight was rated as slightly worse. Hearing was rated as slightly worse. Patient feels that their emotional and mental health rating is same. Patients states they are never, rarely angry. Patient states they are never, rarely unusually tired/fatigued. Pain experienced in the last 7 days has been a lot. Patient's pain rating has been 7/10. Patient states that he has experienced weight loss or gain in last 6 months. Depression Screening:   PHQ-2 Score: 2      Fall Risk Screening: In the past year, patient has experienced: history of falling in past year    Number of falls: 1  Injured during fall?: No    Feels unsteady when standing or walking?: Yes    Worried about falling?: Yes      Home Safety:  Patient has trouble with stairs inside or outside of their home. Patient has working smoke alarms and has working carbon monoxide detector. Nutrition:   Current diet is Regular. Medications:   Patient is not currently taking any over-the-counter supplements. Patient is not able to manage medications. Activities of Daily Living (ADLs)/Instrumental Activities of Daily Living (IADLs):   Walk and transfer into and out of bed and chair?: No  Dress and groom yourself?: No    Bathe or shower yourself?: No    Feed yourself?  No  Do your laundry/housekeeping?: No  Manage your money, pay your bills and track your expenses?: No  Make your own meals?: No    Do your own shopping?: No    Previous Hospitalizations:   Any hospitalizations or ED visits within the last 12 months?: Yes    How many hospitalizations have you had in the last year?: more than 4    Advance Care Planning:   Living will: No    ACP document given: Yes      Comments: Will discuss with wife    Cognitive Screening:   Provider or family/friend/caregiver concerned regarding cognition?: Yes    PREVENTIVE SCREENINGS      Cardiovascular Screening:    General: Screening Not Indicated and History Lipid Disorder    Due for: Lipid Panel      Diabetes Screening:     General: Screening Not Indicated and History Diabetes    Due for: Blood Glucose      Colorectal Cancer Screening:     General: Screening Not Indicated      Prostate Cancer Screening:    General: Screening Not Indicated      Osteoporosis Screening:    General: Screening Not Indicated      Abdominal Aortic Aneurysm (AAA) Screening:    Risk factors include: tobacco use        Lung Cancer Screening:     General: Screening Not Indicated      Hepatitis C Screening:      Hep C Screening Accepted: No     Screening, Brief Intervention, and Referral to Treatment (SBIRT)    Screening      Single Item Drug Screening:  How often have you used an illegal drug (including marijuana) or a prescription medication for non-medical reasons in the past year? never    Single Item Drug Screen Score: 0  Interpretation: Negative screen for possible drug use disorder    Brief Intervention  Alcohol & drug use screenings were reviewed. No concerns regarding substance use disorder identified. Annual Depression Screening  Time spent screening and evaluating the patient for depression during today's encounter was 5 minutes. Other Counseling Topics:   Regular weightbearing exercise and calcium and vitamin D intake. No results found.      Physical Exam:     /100 (BP Location: Left arm, Patient Position: Sitting, Cuff Size: Standard)   Pulse 78   Temp 97.9 °F (36.6 °C) (Tympanic)   Resp 17   Ht 5' 3" (1.6 m)   Wt 62.2 kg (137 lb 3.2 oz)   SpO2 96% Comment: pt wearing 3 litter oxygen  BMI 24.30 kg/m²     Physical Exam  Vitals and nursing note reviewed. Constitutional:       General: He is not in acute distress. Appearance: He is well-developed. He is not ill-appearing (chronically). Comments: Ambulates with cane, answers questions appropriately, difficulty hearing    HENT:      Head: Normocephalic and atraumatic. Right Ear: Tympanic membrane, ear canal and external ear normal.      Left Ear: Tympanic membrane, ear canal and external ear normal.      Mouth/Throat:      Mouth: Mucous membranes are moist.   Eyes:      Extraocular Movements: Extraocular movements intact. Conjunctiva/sclera: Conjunctivae normal.      Pupils: Pupils are equal, round, and reactive to light. Cardiovascular:      Rate and Rhythm: Normal rate and regular rhythm. Heart sounds: No murmur heard. Pulmonary:      Effort: No respiratory distress. Breath sounds: Normal breath sounds. Comments: Decreased effort and breath sounds    Abdominal:      Palpations: Abdomen is soft. Tenderness: There is no abdominal tenderness. Musculoskeletal:         General: No swelling. Cervical back: Neck supple. Right lower leg: No edema. Left lower leg: No edema. Skin:     General: Skin is warm and dry. Capillary Refill: Capillary refill takes less than 2 seconds. Neurological:      Mental Status: He is alert.    Psychiatric:         Mood and Affect: Mood normal.          Norma Lebron PA-C

## 2023-10-25 NOTE — ASSESSMENT & PLAN NOTE
Lab Results   Component Value Date    CHOLESTEROL 127 03/14/2022    CHOLESTEROL 184 06/13/2020    CHOLESTEROL 177 06/12/2020     Lab Results   Component Value Date    HDL 26 (L) 03/14/2022    HDL 48 06/13/2020    HDL 47 06/12/2020     Lab Results   Component Value Date    TRIG 84 03/14/2022    TRIG 81 06/13/2020    TRIG 65 06/12/2020     Lab Results   Component Value Date    NONHDLC 130 06/12/2020    [address] 91 12/24/2018     Lab Results   Component Value Date    LDLCALC 84 03/14/2022

## 2023-10-31 DIAGNOSIS — J44.9 CHRONIC OBSTRUCTIVE PULMONARY DISEASE, UNSPECIFIED COPD TYPE (HCC): ICD-10-CM

## 2023-10-31 RX ORDER — ALBUTEROL SULFATE 2.5 MG/3ML
2.5 SOLUTION RESPIRATORY (INHALATION) EVERY 6 HOURS PRN
Qty: 75 ML | Refills: 3 | Status: SHIPPED | OUTPATIENT
Start: 2023-10-31

## 2023-11-12 DIAGNOSIS — J44.9 CHRONIC OBSTRUCTIVE PULMONARY DISEASE, UNSPECIFIED COPD TYPE (HCC): ICD-10-CM

## 2023-11-13 RX ORDER — ALBUTEROL SULFATE 2.5 MG/3ML
2.5 SOLUTION RESPIRATORY (INHALATION) EVERY 6 HOURS PRN
Qty: 75 ML | Refills: 3 | Status: SHIPPED | OUTPATIENT
Start: 2023-11-13

## 2023-11-15 ENCOUNTER — APPOINTMENT (EMERGENCY)
Dept: CT IMAGING | Facility: HOSPITAL | Age: 88
End: 2023-11-15
Payer: MEDICARE

## 2023-11-15 ENCOUNTER — HOSPITAL ENCOUNTER (OUTPATIENT)
Facility: HOSPITAL | Age: 88
Setting detail: OBSERVATION
Discharge: HOME | End: 2023-11-16
Attending: INTERNAL MEDICINE | Admitting: STUDENT IN AN ORGANIZED HEALTH CARE EDUCATION/TRAINING PROGRAM
Payer: MEDICARE

## 2023-11-15 DIAGNOSIS — R26.2 AMBULATORY DYSFUNCTION: ICD-10-CM

## 2023-11-15 DIAGNOSIS — K62.5 RECTAL BLEEDING: Primary | ICD-10-CM

## 2023-11-15 DIAGNOSIS — K59.00 CONSTIPATION: ICD-10-CM

## 2023-11-15 LAB
ALBUMIN SERPL BCP-MCNC: 3.8 G/DL (ref 3.5–5)
ALP SERPL-CCNC: 80 U/L (ref 34–104)
ALT SERPL W P-5'-P-CCNC: 15 U/L (ref 7–52)
AST SERPL W P-5'-P-CCNC: 20 U/L (ref 13–39)
ATRIAL RATE: 67 BPM
BASOPHILS # BLD MANUAL: 0 THOUSAND/UL (ref 0–0.1)
BASOPHILS NFR MAR MANUAL: 0 % (ref 0–1)
BILIRUB SERPL-MCNC: 0.4 MG/DL (ref 0.2–1)
BUN SERPL-MCNC: 21 MG/DL (ref 5–25)
CALCIUM SERPL-MCNC: 9.5 MG/DL (ref 8.4–10.2)
CHLORIDE SERPL-SCNC: 99 MMOL/L (ref 96–108)
CO2 SERPL-SCNC: >45 MMOL/L (ref 21–32)
CREAT SERPL-MCNC: 0.95 MG/DL (ref 0.6–1.3)
EOSINOPHIL # BLD MANUAL: 0.3 THOUSAND/UL (ref 0–0.4)
EOSINOPHIL NFR BLD MANUAL: 6 % (ref 0–6)
ERYTHROCYTE [DISTWIDTH] IN BLOOD BY AUTOMATED COUNT: 13.2 % (ref 11.6–15.1)
EXT FECAL OCCULT BLOOD SCREEN: POSITIVE
EXT. CONTROL: ABNORMAL
GFR SERPL CREATININE-BSD FRML MDRD: 72 ML/MIN/1.73SQ M
GLUCOSE SERPL-MCNC: 95 MG/DL (ref 65–140)
HCT VFR BLD AUTO: 39.7 % (ref 36.5–49.3)
HCT VFR BLD AUTO: 45.2 % (ref 36.5–49.3)
HGB BLD-MCNC: 11.8 G/DL (ref 12–17)
HGB BLD-MCNC: 13.3 G/DL (ref 12–17)
LIPASE SERPL-CCNC: 32 U/L (ref 11–82)
LYMPHOCYTES # BLD AUTO: 1.63 THOUSAND/UL (ref 0.6–4.47)
LYMPHOCYTES # BLD AUTO: 33 % (ref 14–44)
MACROCYTES BLD QL AUTO: PRESENT
MCH RBC QN AUTO: 31.4 PG (ref 26.8–34.3)
MCHC RBC AUTO-ENTMCNC: 29.4 G/DL (ref 31.4–37.4)
MCV RBC AUTO: 107 FL (ref 82–98)
MONOCYTES # BLD AUTO: 0.64 THOUSAND/UL (ref 0–1.22)
MONOCYTES NFR BLD: 13 % (ref 4–12)
NEUTROPHILS # BLD MANUAL: 2.38 THOUSAND/UL (ref 1.85–7.62)
NEUTS BAND NFR BLD MANUAL: 1 % (ref 0–8)
NEUTS SEG NFR BLD AUTO: 47 % (ref 43–75)
P AXIS: 69 DEGREES
PLATELET # BLD AUTO: 124 THOUSANDS/UL (ref 149–390)
PLATELET BLD QL SMEAR: ABNORMAL
PMV BLD AUTO: 10.1 FL (ref 8.9–12.7)
POTASSIUM SERPL-SCNC: 4 MMOL/L (ref 3.5–5.3)
PR INTERVAL: 174 MS
PROT SERPL-MCNC: 7 G/DL (ref 6.4–8.4)
QRS AXIS: 84 DEGREES
QRSD INTERVAL: 86 MS
QT INTERVAL: 400 MS
QTC INTERVAL: 422 MS
RBC # BLD AUTO: 4.24 MILLION/UL (ref 3.88–5.62)
SODIUM SERPL-SCNC: 145 MMOL/L (ref 135–147)
T WAVE AXIS: 46 DEGREES
VENTRICULAR RATE: 67 BPM
WBC # BLD AUTO: 4.95 THOUSAND/UL (ref 4.31–10.16)

## 2023-11-15 PROCEDURE — 99204 OFFICE O/P NEW MOD 45 MIN: CPT | Performed by: INTERNAL MEDICINE

## 2023-11-15 PROCEDURE — 85014 HEMATOCRIT: CPT | Performed by: INTERNAL MEDICINE

## 2023-11-15 PROCEDURE — 80053 COMPREHEN METABOLIC PANEL: CPT | Performed by: INTERNAL MEDICINE

## 2023-11-15 PROCEDURE — G1004 CDSM NDSC: HCPCS

## 2023-11-15 PROCEDURE — 85007 BL SMEAR W/DIFF WBC COUNT: CPT | Performed by: INTERNAL MEDICINE

## 2023-11-15 PROCEDURE — 99285 EMERGENCY DEPT VISIT HI MDM: CPT | Performed by: INTERNAL MEDICINE

## 2023-11-15 PROCEDURE — 83690 ASSAY OF LIPASE: CPT | Performed by: INTERNAL MEDICINE

## 2023-11-15 PROCEDURE — 36415 COLL VENOUS BLD VENIPUNCTURE: CPT | Performed by: INTERNAL MEDICINE

## 2023-11-15 PROCEDURE — 99285 EMERGENCY DEPT VISIT HI MDM: CPT

## 2023-11-15 PROCEDURE — 94664 DEMO&/EVAL PT USE INHALER: CPT

## 2023-11-15 PROCEDURE — 85027 COMPLETE CBC AUTOMATED: CPT | Performed by: INTERNAL MEDICINE

## 2023-11-15 PROCEDURE — 94760 N-INVAS EAR/PLS OXIMETRY 1: CPT

## 2023-11-15 PROCEDURE — 93010 ELECTROCARDIOGRAM REPORT: CPT | Performed by: INTERNAL MEDICINE

## 2023-11-15 PROCEDURE — 82270 OCCULT BLOOD FECES: CPT | Performed by: INTERNAL MEDICINE

## 2023-11-15 PROCEDURE — 96374 THER/PROPH/DIAG INJ IV PUSH: CPT

## 2023-11-15 PROCEDURE — 85018 HEMOGLOBIN: CPT | Performed by: INTERNAL MEDICINE

## 2023-11-15 PROCEDURE — 93005 ELECTROCARDIOGRAM TRACING: CPT

## 2023-11-15 PROCEDURE — 74178 CT ABD&PLV WO CNTR FLWD CNTR: CPT

## 2023-11-15 PROCEDURE — 99223 1ST HOSP IP/OBS HIGH 75: CPT | Performed by: STUDENT IN AN ORGANIZED HEALTH CARE EDUCATION/TRAINING PROGRAM

## 2023-11-15 PROCEDURE — 94640 AIRWAY INHALATION TREATMENT: CPT

## 2023-11-15 RX ORDER — PANTOPRAZOLE SODIUM 40 MG/1
40 TABLET, DELAYED RELEASE ORAL
Status: DISCONTINUED | OUTPATIENT
Start: 2023-11-16 | End: 2023-11-16 | Stop reason: HOSPADM

## 2023-11-15 RX ORDER — ONDANSETRON 2 MG/ML
4 INJECTION INTRAMUSCULAR; INTRAVENOUS EVERY 6 HOURS PRN
Status: DISCONTINUED | OUTPATIENT
Start: 2023-11-15 | End: 2023-11-16 | Stop reason: HOSPADM

## 2023-11-15 RX ORDER — POLYETHYLENE GLYCOL 3350 17 G/17G
17 POWDER, FOR SOLUTION ORAL 2 TIMES DAILY
Status: DISCONTINUED | OUTPATIENT
Start: 2023-11-15 | End: 2023-11-16 | Stop reason: HOSPADM

## 2023-11-15 RX ORDER — FLUTICASONE FUROATE AND VILANTEROL 200; 25 UG/1; UG/1
1 POWDER RESPIRATORY (INHALATION) DAILY
Status: DISCONTINUED | OUTPATIENT
Start: 2023-11-16 | End: 2023-11-16 | Stop reason: HOSPADM

## 2023-11-15 RX ORDER — MONTELUKAST SODIUM 10 MG/1
10 TABLET ORAL
Status: DISCONTINUED | OUTPATIENT
Start: 2023-11-15 | End: 2023-11-16 | Stop reason: HOSPADM

## 2023-11-15 RX ORDER — DOCUSATE SODIUM 100 MG/1
100 CAPSULE, LIQUID FILLED ORAL 2 TIMES DAILY
Status: DISCONTINUED | OUTPATIENT
Start: 2023-11-15 | End: 2023-11-16 | Stop reason: HOSPADM

## 2023-11-15 RX ORDER — HYDRALAZINE HYDROCHLORIDE 20 MG/ML
10 INJECTION INTRAMUSCULAR; INTRAVENOUS EVERY 4 HOURS PRN
Status: DISCONTINUED | OUTPATIENT
Start: 2023-11-15 | End: 2023-11-16 | Stop reason: HOSPADM

## 2023-11-15 RX ORDER — BRIMONIDINE TARTRATE 2 MG/ML
1 SOLUTION/ DROPS OPHTHALMIC EVERY 8 HOURS SCHEDULED
Status: DISCONTINUED | OUTPATIENT
Start: 2023-11-15 | End: 2023-11-16 | Stop reason: HOSPADM

## 2023-11-15 RX ORDER — LOSARTAN POTASSIUM 25 MG/1
25 TABLET ORAL 2 TIMES DAILY
Status: DISCONTINUED | OUTPATIENT
Start: 2023-11-15 | End: 2023-11-16 | Stop reason: HOSPADM

## 2023-11-15 RX ORDER — ALBUTEROL SULFATE 2.5 MG/3ML
2.5 SOLUTION RESPIRATORY (INHALATION) EVERY 6 HOURS PRN
Status: DISCONTINUED | OUTPATIENT
Start: 2023-11-15 | End: 2023-11-16 | Stop reason: HOSPADM

## 2023-11-15 RX ORDER — LABETALOL HYDROCHLORIDE 5 MG/ML
5 INJECTION, SOLUTION INTRAVENOUS ONCE
Status: COMPLETED | OUTPATIENT
Start: 2023-11-15 | End: 2023-11-15

## 2023-11-15 RX ORDER — ACETAMINOPHEN 325 MG/1
650 TABLET ORAL EVERY 6 HOURS PRN
Status: DISCONTINUED | OUTPATIENT
Start: 2023-11-15 | End: 2023-11-16 | Stop reason: HOSPADM

## 2023-11-15 RX ORDER — FORMOTEROL FUMARATE 20 UG/2ML
20 SOLUTION RESPIRATORY (INHALATION) 2 TIMES DAILY
Status: DISCONTINUED | OUTPATIENT
Start: 2023-11-15 | End: 2023-11-16 | Stop reason: HOSPADM

## 2023-11-15 RX ADMIN — IOHEXOL 100 ML: 350 INJECTION, SOLUTION INTRAVENOUS at 14:05

## 2023-11-15 RX ADMIN — HYDRALAZINE HYDROCHLORIDE 10 MG: 20 INJECTION, SOLUTION INTRAMUSCULAR; INTRAVENOUS at 23:19

## 2023-11-15 RX ADMIN — LOSARTAN POTASSIUM 25 MG: 25 TABLET, FILM COATED ORAL at 21:38

## 2023-11-15 RX ADMIN — POLYETHYLENE GLYCOL 3350 17 G: 17 POWDER, FOR SOLUTION ORAL at 21:40

## 2023-11-15 RX ADMIN — MONTELUKAST 10 MG: 10 TABLET, FILM COATED ORAL at 21:38

## 2023-11-15 RX ADMIN — POLYETHYLENE GLYCOL 3350, SODIUM SULFATE ANHYDROUS, SODIUM BICARBONATE, SODIUM CHLORIDE, POTASSIUM CHLORIDE 4000 ML: 236; 22.74; 6.74; 5.86; 2.97 POWDER, FOR SOLUTION ORAL at 15:45

## 2023-11-15 RX ADMIN — LABETALOL HYDROCHLORIDE 5 MG: 5 INJECTION, SOLUTION INTRAVENOUS at 15:17

## 2023-11-15 RX ADMIN — DOCUSATE SODIUM 100 MG: 100 CAPSULE, LIQUID FILLED ORAL at 19:24

## 2023-11-15 RX ADMIN — BRIMONIDINE TARTRATE 1 DROP: 2 SOLUTION/ DROPS OPHTHALMIC at 21:38

## 2023-11-15 RX ADMIN — FORMOTEROL FUMARATE 20 MCG: 20 SOLUTION RESPIRATORY (INHALATION) at 20:22

## 2023-11-15 NOTE — PLAN OF CARE
Problem: Potential for Falls  Goal: Patient will remain free of falls  Description: INTERVENTIONS:  - Educate patient/family on patient safety including physical limitations  - Instruct patient to call for assistance with activity   - Consult OT/PT to assist with strengthening/mobility   - Keep Call bell within reach  - Keep bed low and locked with side rails adjusted as appropriate  - Keep care items and personal belongings within reach  - Initiate and maintain comfort rounds  - Make Fall Risk Sign visible to staff  - Offer Toileting every  Hours, in advance of need  - Initiate/Maintain alarm  - Obtain necessary fall risk management equipment:   - Apply yellow socks and bracelet for high fall risk patients  - Consider moving patient to room near nurses station  11/15/2023 1803 by Ailyn Taylor RN  Outcome: Progressing  11/15/2023 1802 by Ailyn Taylor RN  Outcome: Progressing     Problem: Prexisting or High Potential for Compromised Skin Integrity  Goal: Skin integrity is maintained or improved  Description: INTERVENTIONS:  - Identify patients at risk for skin breakdown  - Assess and monitor skin integrity  - Assess and monitor nutrition and hydration status  - Monitor labs   - Assess for incontinence   - Turn and reposition patient  - Assist with mobility/ambulation  - Relieve pressure over bony prominences  - Avoid friction and shearing  - Provide appropriate hygiene as needed including keeping skin clean and dry  - Evaluate need for skin moisturizer/barrier cream  - Collaborate with interdisciplinary team   - Patient/family teaching  - Consider wound care consult   11/15/2023 1803 by Ailyn Taylor RN  Outcome: Progressing  11/15/2023 1802 by Ailyn Taylor RN  Outcome: Progressing     Problem: PAIN - ADULT  Goal: Verbalizes/displays adequate comfort level or baseline comfort level  Description: Interventions:  - Encourage patient to monitor pain and request assistance  - Assess pain using appropriate pain scale  - Administer analgesics based on type and severity of pain and evaluate response  - Implement non-pharmacological measures as appropriate and evaluate response  - Consider cultural and social influences on pain and pain management  - Notify physician/advanced practitioner if interventions unsuccessful or patient reports new pain  11/15/2023 1803 by Kel Barrera RN  Outcome: Progressing  11/15/2023 1802 by Kel Barrera RN  Outcome: Progressing     Problem: INFECTION - ADULT  Goal: Absence or prevention of progression during hospitalization  Description: INTERVENTIONS:  - Assess and monitor for signs and symptoms of infection  - Monitor lab/diagnostic results  - Monitor all insertion sites, i.e. indwelling lines, tubes, and drains  - Monitor endotracheal if appropriate and nasal secretions for changes in amount and color  - [place] appropriate cooling/warming therapies per order  - Administer medications as ordered  - Instruct and encourage patient and family to use good hand hygiene technique  - Identify and instruct in appropriate isolation precautions for identified infection/condition  11/15/2023 1803 by Kel Barrera RN  Outcome: Progressing  11/15/2023 1802 by Kel Barrera RN  Outcome: Progressing  Goal: Absence of fever/infection during neutropenic period  Description: INTERVENTIONS:  - Monitor WBC    11/15/2023 1803 by Kel Barrera RN  Outcome: Progressing  11/15/2023 1802 by Kel Barrera RN  Outcome: Progressing     Problem: SAFETY ADULT  Goal: Patient will remain free of falls  Description: INTERVENTIONS:  - Educate patient/family on patient safety including physical limitations  - Instruct patient to call for assistance with activity   - Consult OT/PT to assist with strengthening/mobility   - Keep Call bell within reach  - Keep bed low and locked with side rails adjusted as appropriate  - Keep care items and personal belongings within reach  - Initiate and maintain comfort rounds  - Make Fall Risk Sign visible to staff  - Offer Toileting every  Hours, in advance of need  - Initiate/Maintain alarm  - Obtain necessary fall risk management equipment:   - Apply yellow socks and bracelet for high fall risk patients  - Consider moving patient to room near nurses station  11/15/2023 1803 by Fanny Hastings RN  Outcome: Progressing  11/15/2023 1802 by Fanny Hastings RN  Outcome: Progressing  Goal: Maintain or return to baseline ADL function  Description: INTERVENTIONS:  -  Assess patient's ability to carry out ADLs; assess patient's baseline for ADL function and identify physical deficits which impact ability to perform ADLs (bathing, care of mouth/teeth, toileting, grooming, dressing, etc.)  - Assess/evaluate cause of self-care deficits   - Assess range of motion  - Assess patient's mobility; develop plan if impaired  - Assess patient's need for assistive devices and provide as appropriate  - Encourage maximum independence but intervene and supervise when necessary  - Involve family in performance of ADLs  - Assess for home care needs following discharge   - Consider OT consult to assist with ADL evaluation and planning for discharge  - Provide patient education as appropriate  11/15/2023 1803 by Fanny Hastings RN  Outcome: Progressing  11/15/2023 1802 by Fanny Hastings RN  Outcome: Progressing  Goal: Maintains/Returns to pre admission functional level  Description: INTERVENTIONS:  - Perform AM-PAC 6 Click Basic Mobility/ Daily Activity assessment daily.  - Set and communicate daily mobility goal to care team and patient/family/caregiver. - Collaborate with rehabilitation services on mobility goals if consulted  - Perform Range of Motion  times a day. - Reposition patient every  hours.   - Dangle patient  times a day  - Stand patient  times a day  - Ambulate patient  times a day  - Out of bed to chair  times a day   - Out of bed for meals times a day  - Out of bed for toileting  - Record patient progress and toleration of activity level 11/15/2023 1803 by Vesta Perrin RN  Outcome: Progressing  11/15/2023 1802 by Vesta Perrin RN  Outcome: Progressing     Problem: DISCHARGE PLANNING  Goal: Discharge to home or other facility with appropriate resources  Description: INTERVENTIONS:  - Identify barriers to discharge w/patient and caregiver  - Arrange for needed discharge resources and transportation as appropriate  - Identify discharge learning needs (meds, wound care, etc.)  - Arrange for interpretive services to assist at discharge as needed  - Refer to Case Management Department for coordinating discharge planning if the patient needs post-hospital services based on physician/advanced practitioner order or complex needs related to functional status, cognitive ability, or social support system  11/15/2023 1803 by Vesta Perrin RN  Outcome: Progressing  11/15/2023 1802 by Vesta Perrin RN  Outcome: Progressing     Problem: Knowledge Deficit  Goal: Patient/family/caregiver demonstrates understanding of disease process, treatment plan, medications, and discharge instructions  Description: Complete learning assessment and assess knowledge base.   Interventions:  - Provide teaching at level of understanding  - Provide teaching via preferred learning methods  11/15/2023 1803 by Vesta Perrin RN  Outcome: Progressing  11/15/2023 1802 by Vesta Perrin RN  Outcome: Progressing

## 2023-11-15 NOTE — CONSULTS
Consultation - [address]  [facility]  Edgardo Perez 82 y.o. male MRN: [id number]  Unit/Bed#: ED-04 Encounter: [id number]        Inpatient consult to gastroenterology  Consult performed by: Tejal Talavera DO  Consult ordered by: Kae Ashford MD          Reason for Consult / Principal Problem:     Rectal bleeding    ASSESSMENT AND PLAN:      51-year-old male with history significant for COPD chronically on 2 L nasal cannula, history of DVT, CAD, HTN, HLD, who presented to the hospital with dark red blood per rectum morning concerning for GI bleed. He remains clinically stable without drop in hemoglobin. No further recurrent episodes of hematochezia. No alarm symptoms. He has significant constipation. CT imaging was reviewed which revealed significant stool burden throughout the colon and into the rectum which is likely the etiology of this dark red blood in the rectum hemorrhoidal bleeding. I do not believe that this is an acute GI bleed. Start GoLytely prep to treat severe constipation. Patient will need to be on a regular bowel regimen including MiraLAX twice daily and Colace twice daily moving forward. Monitor stool output. Transfuse for less than 7. Needs outpatient colonoscopy for colon cancer screening. Rest of care per primary team.  Thank you for this consultation. ______________________________________________________________________    HPI: Edgardo Perez is an [id number] male with history significant for COPD chronically on 2 L nasal cannula, history of DVT, CAD, HTN, HLD, whom we are asked to see in consultation for rectal bleeding. Patient was brought to the hospital with concern for dark red blood per rectum and on the bed by his wife this morning. Of note, patient reports significant constipation over the last several weeks with inability to move his bowels despite significant straining.   Denies any nausea, vomiting, abdominal pain, diarrhea, weight loss, gross hematochezia, melena. Per son at the bedside, patient had called his daughter due to concerns for inability to move his bowels. No prior colonoscopy. Of note, patient has had admission for similar rectal bleeding in the past which was believed to be due to hemorrhoids. He was to undergo outpatient colonoscopy for colon cancer screening which has not been completed yet. On evaluation in the ED. His hemoglobin was stable and no further recurrent episodes of this dark red blood per rectum. REVIEW OF SYSTEMS:  10 point ROS reviewed and negative except otherwise noted in the HPI above. Historical Information   Past Medical History:   Diagnosis Date    Acute metabolic encephalopathy 91/04/0276    Acute on chronic respiratory failure ([address]) 12/4/2022    Age-related cataract of right eye 4/4/2023    patient is medically cleared and presents with low risk of complication with this upcoming R cataract surgery.     Anemia     Aneurysm, aorta, thoracic (HCC)     Appendicolith     Ascending aortic aneurysm (HCC)     3.7    Asthma     BPH (benign prostatic hyperplasia)     CAD (coronary artery disease)     noted on CT scan    CHF (congestive heart failure) (HCC)     COPD (chronic obstructive pulmonary disease) ([address])     Descending thoracic aortic aneurysm ([address])     Diabetes mellitus ([address])     Diverticulosis     Former tobacco use     GERD (gastroesophageal reflux disease)     History of DVT (deep vein thrombosis)     Left leg    History of transfusion     Hypertension     Inguinal hernia     right    Nephrolithiasis     Oxygen dependent     2LNC    Oxygen dependent     Pneumonia     Pre-diabetes     Prostate calculus     PVD (peripheral vascular disease) ([address])     Recurrent right inguinal hernia w incarcertion 6/8/3522    Small bowel obstruction ([address]) 12/28/2022    Thoracic aortic aneurysm without rupture ([address]) 11/19/2018    Added automatically from request for surgery [id number]    Thrombocytopenia ([address]) 2018    Ulcer     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)     Varicose vein of leg     b/l     Past Surgical History:   Procedure Laterality Date    CARDIAC SURGERY      ESOPHAGOGASTRODUODENOSCOPY      HERNIA REPAIR Right 2019    Procedure: REPAIR HERNIA INGUINAL WITH MESH;  Surgeon: Peri South MD;  Location: [address] [place] [address] MAIN OR;  Service: General    HERNIA REPAIR Right 2023    Procedure: REPAIR RECURRENT INCARERATED HERNIA INGUINAL OPEN, RIGHT ORCHIECTOMY;  Surgeon: Adrienne Shetty MD;  Location: [facility];  Service: General    INGUINAL HERNIA REPAIR Bilateral     IR TEVAR  2018    UT EVASC RPR DTA COVERAGE ART ORIGIN 1ST ENDOPROSTH N/A 2018    Procedure: TEVAR - endovascular thoracic aortic aneurysm repair;  Surgeon: Alexandria Natarajan MD;  Location:  MAIN OR;  Service: Vascular    THORACIC AORTIC ANEURYSM REPAIR  2018    VARICOSE VEIN SURGERY Bilateral     vein stripping     Social History   Social History     Substance and Sexual Activity   Alcohol Use Never     Social History     Substance and Sexual Activity   Drug Use No     Social History     Tobacco Use   Smoking Status Former    Packs/day: 1.00    Years: 35.00    Total pack years: 35.00    Types: Cigarettes    Start date:     Quit date:     Years since quittin.8   Smokeless Tobacco Never     Family History   Problem Relation Age of Onset    Tuberculosis Mother     No Known Problems Father     Cancer Sister     Diabetes Family     Hypertension Family        Meds/Allergies     (Not in a hospital admission)    Current Facility-Administered Medications   Medication Dose Route Frequency    labetalol (NORMODYNE) injection 5 mg  5 mg Intravenous Once       Allergies   Allergen Reactions    Penicillins Hives, Itching and Rash    Lisinopril Rash     Side pains and rash     Zyprexa [Olanzapine] Tongue Swelling         Objective     Blood pressure (!) 218/91, pulse 85, temperature 98.1 °F (36.7 °C), temperature source Oral, resp. rate (!) 26, weight 66.9 kg (147 lb 7.8 oz), SpO2 99 %. Body mass index is 26.13 kg/m².     PHYSICAL EXAM:    General: Well-appearing, NAD  Eyes: no conjunctival icterus or pallor  Abdominal: Soft, non-tender, non-distended  Extremities: Warm, no deformities, no edema  Neuro: alert and oriented  Psych: Normal affect    Lab Results:   Admission on 11/15/2023   Component Date Value    Ventricular Rate 11/15/2023 67     Atrial Rate 11/15/2023 67     ME Interval 11/15/2023 174     QRSD Interval 11/15/2023 86     QT Interval 11/15/2023 400     QTC Interval 11/15/2023 422     P Axis 11/15/2023 69     QRS [place] 11/15/2023 84     T Wave [place] 11/15/2023 46     WBC 11/15/2023 4.95     RBC 11/15/2023 4.24     Hemoglobin 11/15/2023 13.3     Hematocrit 11/15/2023 45.2     MCV 11/15/2023 107 (H)     MCH 11/15/2023 31.4     MCHC 11/15/2023 29.4 (L)     RDW 11/15/2023 13.2     MPV 11/15/2023 10.1     Platelets 52/77/2354 124 (L)     Sodium 11/15/2023 145     Potassium 11/15/2023 4.0     Chloride 11/15/2023 99     CO2 11/15/2023 >45 (HH)     ANION GAP 11/15/2023      BUN 11/15/2023 21     Creatinine 11/15/2023 0.95     Glucose 11/15/2023 95     Calcium 11/15/2023 9.5     AST 11/15/2023 20     ALT 11/15/2023 15     Alkaline Phosphatase 11/15/2023 80     Total Protein 11/15/2023 7.0     Albumin 11/15/2023 3.8     Total Bilirubin 11/15/2023 0.40     eGFR 11/15/2023 72     Lipase 11/15/2023 32     EXT Fecal Occult Blood 11/15/2023 Positive (A)     Control 11/15/2023 Valid     Segmented % 11/15/2023 47     Bands % 11/15/2023 1     Lymphocytes % 11/15/2023 33     Monocytes % 11/15/2023 13 (H)     Eosinophils, % 11/15/2023 6     Basophils % 11/15/2023 0     Absolute Neutrophils 11/15/2023 2.38     Lymphocytes Absolute 11/15/2023 1.63     Monocytes Absolute 11/15/2023 0.64     Eosinophils Absolute 11/15/2023 0.30     Basophils Absolute 11/15/2023 0.00     Platelet Estimate 47/83/3654 Borderline (A) Macrocytes 11/15/2023 Present        Imaging Studies: I have personally reviewed pertinent imaging studies. Latha Black D.O.   Fellow, PGY-5  Division of Gastroenterology & Hepatology  Available on [facility]  11/15/2023 2:56 PM

## 2023-11-15 NOTE — ED PROVIDER NOTES
History  Chief Complaint   Patient presents with    Rectal Bleeding     Pt wife reports pt stating he wet himself in the bed and when she looked there was dark blood in the bed and then in the toilet when he went. Hx of this happening about a year ago as well. pt denies abdominal pain at this time RQ tender to touch      HPI  28-year-old man with a history of COPD on 2 L home oxygen, coronary artery disease, DVT presents to ED for evaluation of rectal bleeding. Patient reports when he woke up there was dark blood in the bed. He also noticed blood mixed with stool in the toilet this morning. He denies any dark color stool. States this happened 1 year ago. Reports increased straining and with bowel movements. Denies any abdominal pain unless he presses on his right abdomen. No associated nausea or vomiting. No fevers or chills. States he has been eating and drinking normally. Patient reports she is never had a colonoscopy. Patient denies headache, visual changes, dizziness, chest pain, palpitations, dysuria, new skin rashes or numbness or tingling of the extremities. Prior to Admission Medications   Prescriptions Last Dose Informant Patient Reported? Taking? Trelegy Ellipta 200-62.5-25 MCG/ACT AEPB inhaler   Yes No   Sig: INHALE 1 PUFF DAILY RINSE MOUTH AFTER USE.   albuterol (2.5 mg/3 mL) 0.083 % nebulizer solution   No No   Sig: TAKE 3 ML (2.5 MG TOTAL) BY NEBULIZATION EVERY 6 (SIX) HOURS AS NEEDED FOR WHEEZING OR SHORTNESS OF BREATH   albuterol (PROVENTIL HFA,VENTOLIN HFA) 90 mcg/act inhaler   No No   Sig: INHALE 2 PUFFS EVERY 6 (SIX) HOURS AS NEEDED FOR WHEEZING   brimonidine tartrate 0.2 % ophthalmic solution   Yes No   budesonide (Pulmicort) 0.5 mg/2 mL nebulizer solution   No No   Sig: Take 2 mL (0.5 mg total) by nebulization 2 (two) times a day Rinse mouth after use.    formoterol (PERFOROMIST) 20 MCG/2ML nebulizer solution   No No   Sig: Take 2 mL (20 mcg total) by nebulization 2 (two) times a day   hydroxypropyl methylcellulose (GONAK) 2.5 % ophthalmic solution   Yes No   Sig: Apply 1 drop to eye Three times a day   losartan (COZAAR) 25 mg tablet   No No   Sig: Take 1 tablet (25 mg total) by mouth 2 (two) times a day   montelukast (SINGULAIR) 10 mg tablet   No No   Sig: Take 1 tablet (10 mg total) by mouth daily at bedtime Diana 1 tableta en la noche para las alergias   pantoprazole (PROTONIX) 40 mg tablet   No No   Sig: TAKE 1 TABLET (40 MG TOTAL) BY MOUTH DAILY   predniSONE 5 mg tablet   No No   Sig: Take 1 tablet (5 mg total) by mouth daily      Facility-Administered Medications: None       Past Medical History:   Diagnosis Date    Acute metabolic encephalopathy 16/14/2056    Acute on chronic respiratory failure ([address]) 12/4/2022    Age-related cataract of right eye 4/4/2023    patient is medically cleared and presents with low risk of complication with this upcoming R cataract surgery.     Anemia     Aneurysm, aorta, thoracic (HCC)     Appendicolith     Ascending aortic aneurysm (HCC)     3.7    Asthma     BPH (benign prostatic hyperplasia)     CAD (coronary artery disease)     noted on CT scan    CHF (congestive heart failure) (HCC)     COPD (chronic obstructive pulmonary disease) ([address])     Descending thoracic aortic aneurysm ([address])     Diabetes mellitus ([address])     Diverticulosis     Former tobacco use     GERD (gastroesophageal reflux disease)     History of DVT (deep vein thrombosis)     Left leg    History of transfusion     Hypertension     Inguinal hernia     right    Nephrolithiasis     Oxygen dependent     2LNC    Oxygen dependent     Pneumonia     Pre-diabetes     Prostate calculus     PVD (peripheral vascular disease) ([address])     Recurrent right inguinal hernia w incarcertion 5/9/5848    Small bowel obstruction ([address]) 12/28/2022    Thoracic aortic aneurysm without rupture ([address]) 11/19/2018    Added automatically from request for surgery [id number]    Thrombocytopenia ([address]) 12/29/2018    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)     Varicose vein of leg     b/l       Past Surgical History:   Procedure Laterality Date    CARDIAC SURGERY      ESOPHAGOGASTRODUODENOSCOPY      HERNIA REPAIR Right 2019    Procedure: REPAIR HERNIA INGUINAL WITH MESH;  Surgeon: Maggie Dobson MD;  Location: [address] [place] [address] MAIN OR;  Service: General    HERNIA REPAIR Right 2023    Procedure: REPAIR RECURRENT INCARERATED HERNIA INGUINAL OPEN, RIGHT ORCHIECTOMY;  Surgeon: Brooklynn Figueroa MD;  Location: [facility] Main OR;  Service: General    INGUINAL HERNIA REPAIR Bilateral     IR TEVAR  2018    VT EVASC RPR DTA COVERAGE ART ORIGIN 1ST ENDOPROSTH N/A 2018    Procedure: TEVAR - endovascular thoracic aortic aneurysm repair;  Surgeon: Rosendo Ortega MD;  Location:  MAIN OR;  Service: Vascular    THORACIC AORTIC ANEURYSM REPAIR  2018    VARICOSE VEIN SURGERY Bilateral     vein stripping       Family History   Problem Relation Age of Onset    Tuberculosis Mother     No Known Problems Father     Cancer Sister     Diabetes Family     Hypertension Family      I have reviewed and agree with the history as documented. E-Cigarette/Vaping    E-Cigarette Use Never User      E-Cigarette/Vaping Substances     Social History     Tobacco Use    Smoking status: Former     Packs/day: 1.00     Years: 35.00     Total pack years: 35.00     Types: Cigarettes     Start date:      Quit date:      Years since quittin.8    Smokeless tobacco: Never   Vaping Use    Vaping Use: Never used   Substance Use Topics    Alcohol use: Never    Drug use: No       Review of Systems   All other systems reviewed and are negative.       Physical Exam  Physical Exam  PHYSICAL EXAM    Constitutional:  Well developed, well nourished, no acute distress, non-toxic appearance    HEENT:  Conjunctiva normal. Oropharynx moist  Respiratory:  No respiratory distress, normal breath sounds  Cardiovascular:  Normal rate, normal rhythm, no murmurs  GI:  Soft, nondistended, normal bowel sounds, tender to palpation over right lower quadrant. Rectal exam with no hemorrhoids, fissures or abscess. Hemoccult positive  :  No costovertebral angle tenderness   Musculoskeletal:  No edema, no tenderness, no deformities.    Integument:  Well hydrated, no rash   Lymphatic:  No lymphadenopathy noted   Neurologic:  Alert & oriented, normal motor function, normal sensory function, no focal deficits noted   Psychiatric:  Speech and behavior appropriate       Vital Signs  ED Triage Vitals   Temperature Pulse Respirations Blood Pressure SpO2   11/15/23 1022 11/15/23 1022 11/15/23 1022 11/15/23 1024 11/15/23 1022   98.1 °F (36.7 °C) 66 16 (!) 187/87 99 %      Temp Source Heart Rate Source Patient Position - Orthostatic VS BP Location FiO2 (%)   11/15/23 1022 11/15/23 1145 11/15/23 1022 11/15/23 1022 --   Oral Monitor Lying Left arm       Pain Score       11/15/23 1543       No Pain           Vitals:    11/15/23 1145 11/15/23 1300 11/15/23 1415 11/15/23 1543   BP: (!) 179/86 (!) 181/87 (!) 218/91 (!) 182/83   Pulse: 66 66 85 67   Patient Position - Orthostatic VS: Lying Lying Lying Lying         Visual Acuity      ED Medications  Medications   polyethylene glycol (MIRALAX) packet 17 g (has no administration in time range)   docusate sodium (COLACE) capsule 100 mg (has no administration in time range)   iohexol (OMNIPAQUE) 350 MG/ML injection (SINGLE-DOSE) 100 mL (100 mL Intravenous Given 11/15/23 1405)   labetalol (NORMODYNE) injection 5 mg (5 mg Intravenous Given 11/15/23 1517)   polyethylene glycol (GOLYTELY) bowel prep 4,000 mL (4,000 mL Oral Given 11/15/23 1545)       Diagnostic Studies  Results Reviewed       Procedure Component Value Units Date/Time    Hemoglobin and hematocrit, blood [[id number]]  (Abnormal) Collected: 11/15/23 1543    Lab Status: Final result Specimen: Blood from Arm, Right Updated: 11/15/23 1553     Hemoglobin 11.8 g/dL      Hematocrit 39.7 % CMP [[id number]]  (Abnormal) Collected: 11/15/23 1300    Lab Status: Final result Specimen: Blood from Arm, Right Updated: 11/15/23 1341     Sodium 145 mmol/L      Potassium 4.0 mmol/L      Chloride 99 mmol/L      CO2 >45 mmol/L      ANION GAP --     BUN 21 mg/dL      Creatinine 0.95 mg/dL      Glucose 95 mg/dL      Calcium 9.5 mg/dL      AST 20 U/L      ALT 15 U/L      Alkaline Phosphatase 80 U/L      Total Protein 7.0 g/dL      Albumin 3.8 g/dL      Total Bilirubin 0.40 mg/dL      eGFR 72 ml/min/1.73sq m     Narrative:      National Kidney Disease Foundation guidelines for Chronic Kidney Disease (CKD):     Stage 1 with normal or high GFR (GFR > 90 mL/min/1.73 square meters)    Stage 2 Mild CKD (GFR = 60-89 mL/min/1.73 square meters)    Stage 3A Moderate CKD (GFR = 45-59 mL/min/1.73 square meters)    Stage 3B Moderate CKD (GFR = 30-44 mL/min/1.73 square meters)    Stage 4 Severe CKD (GFR = 15-29 mL/min/1.73 square meters)    Stage 5 End Stage CKD (GFR <15 mL/min/1.73 square meters)  Note: GFR calculation is accurate only with a steady state creatinine    Lipase [[id number]]  (Normal) Collected: 11/15/23 1300    Lab Status: Final result Specimen: Blood from Arm, Right Updated: 11/15/23 1332     Lipase 32 u/L     Manual Differential(PHLEBS Do Not Order) [[id number]]  (Abnormal) Collected: 11/15/23 1100    Lab Status: Final result Specimen: Blood from Arm, Right Updated: 11/15/23 1137     Segmented % 47 %      Bands % 1 %      Lymphocytes % 33 %      Monocytes % 13 %      Eosinophils, % 6 %      Basophils % 0 %      Absolute Neutrophils 2.38 Thousand/uL      Lymphocytes Absolute 1.63 Thousand/uL      Monocytes Absolute 0.64 Thousand/uL      Eosinophils Absolute 0.30 Thousand/uL      Basophils Absolute 0.00 Thousand/uL      Total Counted --     Platelet Estimate Borderline     Macrocytes Present    CBC and differential [[id number]]  (Abnormal) Collected: 11/15/23 1100    Lab Status: Final result Specimen: Blood from Arm, Right Updated: 11/15/23 1112     WBC 4.95 Thousand/uL      RBC 4.24 Million/uL      Hemoglobin 13.3 g/dL      Hematocrit 45.2 %       fL      MCH 31.4 pg      MCHC 29.4 g/dL      RDW 13.2 %      MPV 10.1 fL      Platelets 665 Thousands/uL     Narrative: This is an appended report. These results have been appended to a previously verified report. POCT occult blood stool [[id number]]  (Abnormal) Resulted: 11/15/23 1057    Lab Status: Final result Specimen: Stool Updated: 11/15/23 1058     EXT Fecal Occult Blood Positive     Control Valid                   CT high volume bleeding scan abdomen pelvis   Final Result by Noemy Mortensen MD (11/15 1502)   Stool throughout the colon with some hyperdense stool material. Motion artifact limit evaluation   No obvious CT evidence of active high volume gastrointestinal hemorrhage. Correlate for hemorrhoids given rectal bleeding history. GI consult recommended. Workstation performed: [id number]                    Procedures  Procedures         ED Course  ED Course as of 11/15/23 1740   Wed Nov 15, 2023   1100 EXT Fecal Occult Blood (Ref: Negative)(!): Positive                               SBIRT 22yo+      Flowsheet Row Most Recent Value   Initial Alcohol Screen: US AUDIT-C     1. How often do you have a drink containing alcohol? 0 Filed at: 11/15/2023 1021   2. How many drinks containing alcohol do you have on a typical day you are drinking? 0 Filed at: 11/15/2023 1021   3a. Male UNDER 65: How often do you have five or more drinks on one occasion? 0 Filed at: 11/15/2023 1021   3b. FEMALE Any Age, or MALE 65+: How often do you have 4 or more drinks on one occassion? 0 Filed at: 11/15/2023 1021   Audit-C Score 0 Filed at: 11/15/2023 1021   ALAN: How many times in the past year have you. .. Used an illegal drug or used a prescription medication for non-medical reasons?  Never Filed at: 11/15/2023 1021                      Medical Decision Making  57-year-old man presents to ED for evaluation of rectal bleeding. Hemoccult positive, otherwise rectal exam was normal.  Will obtain labs to rule out anemia, metabolic disturbance, BELLA. High-volume bleeding scan of the abdomen and pelvis showed no acute bleeding. It was significant for increased stool burden. GI evaluated patient at bedside, recommended GoLytely prep, daily bowel regiment, observation and inpatient vs outpatient colonoscopy. Repeat hb decreased, no indication for transfusion yet, no signs of continued bleeding. Discusssed pt with SLIM and pt is admitted to their service. Amount and/or Complexity of Data Reviewed  External Data Reviewed: labs and radiology. Labs: ordered. Decision-making details documented in ED Course. Radiology: ordered. Risk  Prescription drug management. Decision regarding hospitalization. Disposition  Final diagnoses:   Rectal bleeding   Constipation     Time reflects when diagnosis was documented in both MDM as applicable and the Disposition within this note       Time User Action Codes Description Comment    11/15/2023 11:42 AM Naomy Buckner Add [K62.5] Rectal bleeding     11/15/2023  3:16 PM Naomy Buckner Add [K59.00] Constipation           ED Disposition       ED Disposition   Admit    Condition   Stable    Date/Time   Wed Nov 15, 2023 1619    Comment   Case was discussed with MONICO and the patient's admission status was agreed to be Admission Status: observation status to the service of Dr. Lorne Almendarez.                Follow-up Information    None         Current Discharge Medication List        CONTINUE these medications which have NOT CHANGED    Details   albuterol (2.5 mg/3 mL) 0.083 % nebulizer solution TAKE 3 ML (2.5 MG TOTAL) BY NEBULIZATION EVERY 6 (SIX) HOURS AS NEEDED FOR WHEEZING OR SHORTNESS OF BREATH  Qty: 75 mL, Refills: 3    Associated Diagnoses: Chronic obstructive pulmonary disease, unspecified COPD type ([address])      albuterol (PROVENTIL HFA,VENTOLIN HFA) 90 mcg/act inhaler INHALE 2 PUFFS EVERY 6 (SIX) HOURS AS NEEDED FOR WHEEZING  Qty: 18 g, Refills: 5    Comments: Substitution to a formulary equivalent within the same pharmaceutical class is authorized. Associated Diagnoses: Chronic obstructive pulmonary disease with acute exacerbation ([facility])      brimonidine tartrate 0.2 % ophthalmic solution       budesonide (Pulmicort) 0.5 mg/2 mL nebulizer solution Take 2 mL (0.5 mg total) by nebulization 2 (two) times a day Rinse mouth after use. Qty: 120 mL, Refills: 5    Associated Diagnoses: Chronic obstructive pulmonary disease, unspecified COPD type ([facility])      formoterol (PERFOROMIST) 20 MCG/2ML nebulizer solution Take 2 mL (20 mcg total) by nebulization 2 (two) times a day  Qty: 120 mL, Refills: 30    Associated Diagnoses: Chronic obstructive pulmonary disease, unspecified COPD type ([facility])      hydroxypropyl methylcellulose (GONAK) 2.5 % ophthalmic solution Apply 1 drop to eye Three times a day      losartan (COZAAR) 25 mg tablet Take 1 tablet (25 mg total) by mouth 2 (two) times a day  Qty: 180 tablet, Refills: 1    Associated Diagnoses: Benign essential hypertension      montelukast (SINGULAIR) 10 mg tablet Take 1 tablet (10 mg total) by mouth daily at bedtime Diana 1 tableta en la noche para las alergias  Qty: 90 tablet, Refills: 0    Associated Diagnoses: Chronic obstructive pulmonary disease, unspecified COPD type ([facility])      pantoprazole (PROTONIX) 40 mg tablet TAKE 1 TABLET (40 MG TOTAL) BY MOUTH DAILY  Qty: 30 tablet, Refills: 5    Associated Diagnoses: Chronic gastritis without bleeding, unspecified gastritis type      predniSONE 5 mg tablet Take 1 tablet (5 mg total) by mouth daily  Qty: 30 tablet, Refills: 0    Associated Diagnoses: Chronic respiratory failure with hypoxia ([facility])      Trelegy Ellipta 200-62.5-25 MCG/ACT AEPB inhaler INHALE 1 PUFF DAILY RINSE MOUTH AFTER USE. No discharge procedures on file.     PDMP Review Value Time User    PDMP Reviewed  Yes 7/28/2023 10:27 AM Barry Agarwal MD            ED Provider  Electronically Signed by             Ang Moncada MD  11/15/23 [phone]

## 2023-11-16 ENCOUNTER — PREP FOR PROCEDURE (OUTPATIENT)
Dept: GASTROENTEROLOGY | Facility: CLINIC | Age: 88
End: 2023-11-16

## 2023-11-16 VITALS
BODY MASS INDEX: 25.55 KG/M2 | HEIGHT: 63 IN | DIASTOLIC BLOOD PRESSURE: 87 MMHG | TEMPERATURE: 98 F | SYSTOLIC BLOOD PRESSURE: 174 MMHG | WEIGHT: 144.18 LBS | OXYGEN SATURATION: 95 % | HEART RATE: 67 BPM | RESPIRATION RATE: 18 BRPM

## 2023-11-16 DIAGNOSIS — Z12.11 COLON CANCER SCREENING: Primary | ICD-10-CM

## 2023-11-16 LAB
ANION GAP SERPL CALCULATED.3IONS-SCNC: 3 MMOL/L
BUN SERPL-MCNC: 18 MG/DL (ref 5–25)
CALCIUM SERPL-MCNC: 9.3 MG/DL (ref 8.4–10.2)
CHLORIDE SERPL-SCNC: 98 MMOL/L (ref 96–108)
CO2 SERPL-SCNC: 42 MMOL/L (ref 21–32)
CREAT SERPL-MCNC: 0.79 MG/DL (ref 0.6–1.3)
ERYTHROCYTE [DISTWIDTH] IN BLOOD BY AUTOMATED COUNT: 13.2 % (ref 11.6–15.1)
GFR SERPL CREATININE-BSD FRML MDRD: 81 ML/MIN/1.73SQ M
GLUCOSE SERPL-MCNC: 79 MG/DL (ref 65–140)
HCT VFR BLD AUTO: 40.8 % (ref 36.5–49.3)
HGB BLD-MCNC: 12.5 G/DL (ref 12–17)
MCH RBC QN AUTO: 31.5 PG (ref 26.8–34.3)
MCHC RBC AUTO-ENTMCNC: 30.6 G/DL (ref 31.4–37.4)
MCV RBC AUTO: 103 FL (ref 82–98)
PLATELET # BLD AUTO: 124 THOUSANDS/UL (ref 149–390)
PMV BLD AUTO: 9.3 FL (ref 8.9–12.7)
POTASSIUM SERPL-SCNC: 3.8 MMOL/L (ref 3.5–5.3)
RBC # BLD AUTO: 3.97 MILLION/UL (ref 3.88–5.62)
SODIUM SERPL-SCNC: 143 MMOL/L (ref 135–147)
TSH SERPL DL<=0.05 MIU/L-ACNC: 0.69 UIU/ML (ref 0.45–4.5)
WBC # BLD AUTO: 6.93 THOUSAND/UL (ref 4.31–10.16)

## 2023-11-16 PROCEDURE — 99232 SBSQ HOSP IP/OBS MODERATE 35: CPT | Performed by: INTERNAL MEDICINE

## 2023-11-16 PROCEDURE — 85027 COMPLETE CBC AUTOMATED: CPT | Performed by: STUDENT IN AN ORGANIZED HEALTH CARE EDUCATION/TRAINING PROGRAM

## 2023-11-16 PROCEDURE — 94760 N-INVAS EAR/PLS OXIMETRY 1: CPT

## 2023-11-16 PROCEDURE — 99239 HOSP IP/OBS DSCHRG MGMT >30: CPT | Performed by: PHYSICIAN ASSISTANT

## 2023-11-16 PROCEDURE — 84443 ASSAY THYROID STIM HORMONE: CPT | Performed by: PHYSICIAN ASSISTANT

## 2023-11-16 PROCEDURE — 80048 BASIC METABOLIC PNL TOTAL CA: CPT | Performed by: STUDENT IN AN ORGANIZED HEALTH CARE EDUCATION/TRAINING PROGRAM

## 2023-11-16 PROCEDURE — 94640 AIRWAY INHALATION TREATMENT: CPT

## 2023-11-16 RX ORDER — POLYETHYLENE GLYCOL 3350 17 G/17G
17 POWDER, FOR SOLUTION ORAL 2 TIMES DAILY
Refills: 0
Start: 2023-11-16 | End: 2023-11-21 | Stop reason: ALTCHOICE

## 2023-11-16 RX ORDER — DOCUSATE SODIUM 100 MG/1
100 CAPSULE, LIQUID FILLED ORAL 2 TIMES DAILY
Refills: 0
Start: 2023-11-16

## 2023-11-16 RX ORDER — POLYETHYLENE GLYCOL 3350, SODIUM SULFATE ANHYDROUS, SODIUM BICARBONATE, SODIUM CHLORIDE, POTASSIUM CHLORIDE 236; 22.74; 6.74; 5.86; 2.97 G/4L; G/4L; G/4L; G/4L; G/4L
4000 POWDER, FOR SOLUTION ORAL ONCE
Qty: 4000 ML | Refills: 0 | Status: SHIPPED | OUTPATIENT
Start: 2023-11-16 | End: 2023-11-16

## 2023-11-16 RX ADMIN — BRIMONIDINE TARTRATE 1 DROP: 2 SOLUTION/ DROPS OPHTHALMIC at 13:45

## 2023-11-16 RX ADMIN — FLUTICASONE FUROATE AND VILANTEROL TRIFENATATE 1 PUFF: 200; 25 POWDER RESPIRATORY (INHALATION) at 08:53

## 2023-11-16 RX ADMIN — FORMOTEROL FUMARATE 20 MCG: 20 SOLUTION RESPIRATORY (INHALATION) at 07:06

## 2023-11-16 RX ADMIN — LOSARTAN POTASSIUM 25 MG: 25 TABLET, FILM COATED ORAL at 08:53

## 2023-11-16 RX ADMIN — DOCUSATE SODIUM 100 MG: 100 CAPSULE, LIQUID FILLED ORAL at 08:53

## 2023-11-16 RX ADMIN — PANTOPRAZOLE SODIUM 40 MG: 40 TABLET, DELAYED RELEASE ORAL at 05:08

## 2023-11-16 RX ADMIN — BRIMONIDINE TARTRATE 1 DROP: 2 SOLUTION/ DROPS OPHTHALMIC at 05:08

## 2023-11-16 RX ADMIN — POLYETHYLENE GLYCOL 3350 17 G: 17 POWDER, FOR SOLUTION ORAL at 08:53

## 2023-11-16 NOTE — UTILIZATION REVIEW
Initial Clinical Review    Admission: Date/Time/Statement:   Admission Orders (From admission, onward)       Ordered        11/15/23 1620  Place in Observation  Once                          Orders Placed This Encounter   Procedures    Place in Observation     Standing Status:   Standing     Number of Occurrences:   1     Order Specific Question:   Level of Care     Answer:   Med Surg [16]     ED Arrival Information       Expected   -    Arrival   11/15/2023 10:04    Acuity   Emergent              Means of arrival   Ambulance    Escorted by   [facility] ([address] [place] [address])    Service   Hospitalist    Admission type   Emergency              Arrival complaint   rectal bleeding             Chief Complaint   Patient presents with    Rectal Bleeding     Pt wife reports pt stating he wet himself in the bed and when she looked there was dark blood in the bed and then in the toilet when he went. Hx of this happening about a year ago as well. pt denies abdominal pain at this time RQ tender to touch        Initial Presentation: 82 y.o. male presents to ED via  EMS  from home with rectal  bleeding. Has been constipated for several weeks,  had multiple episodes  of rectal bleeding the  day of admission. Hemoglobin initiall  13.3, repeat  11.8. PMH  is  chronic  thrombocytopenia ,  benign essential hypertension, chronic respiratory  failure with hypoxia ,   O2  2-3  L  baseline,  CHF and  COPD. Admit  Observation with  Rectal bleeding  and plan is monitor labs, GI consult, possible colonoscopy,  and continue home meds.         ED Triage Vitals   Temperature Pulse Respirations Blood Pressure SpO2   11/15/23 1022 11/15/23 1022 11/15/23 1022 11/15/23 1024 11/15/23 1022   98.1 °F (36.7 °C) 66 16 (!) 187/87 99 %      Temp Source Heart Rate Source Patient Position - Orthostatic VS BP Location FiO2 (%)   11/15/23 1022 11/15/23 1145 11/15/23 1022 11/15/23 1022 --   Oral Monitor Lying Left arm       Pain Score       11/15/23 1543 No Pain          Wt Readings from Last 1 Encounters:   11/15/23 65.4 kg (144 lb 2.9 oz)     Additional Vital Signs:   8 °F (36.7 °C) 67 18 174/87 Abnormal  116 91 % -- -- -- --    11/16/23 02:52:10 -- 65 -- 138/72 94 92 % -- -- -- --   11/16/23 00:36:31 -- 66 -- 159/80 106 94 % -- -- -- --   11/16/23 00:17:19 98.1 °F (36.7 °C) 66 18 164/89 114 94 % -- -- None (Room air) Lying   11/16/23 00:16:25 -- 63 -- 182/86 Abnormal  118 94 % -- -- -- --   11/15/23 23:08:44 98.1 °F (36.7 °C) 61 18 166/97 120 91 % -- -- None (Room air) Lying   11/15/23 22:50:56 98.1 °F (36.7 °C) 69 18 179/99 Abnormal  126 92 % -- -- None (Room air) Lying   11/15/23 2022 -- -- -- -- -- 96 % -- -- -- --   11/15/23 19:22:51 97.2 °F (36.2 °C) Abnormal  68 17 179/111 Abnormal  134 96 % 28 2 L/min Nasal cannula Lying   11/15/23 17:50:24 97.6 °F (36.4 °C) 67 16 191/104 Abnormal  133 96 % 36 4 L/min Nasal cannula Lying   11/15/23 1543 -- 67 -- 182/83 Abnormal  -- 100 % -- -- None (Room air) Lying   11/15/23 1415 -- 85 26 Abnormal  218/91 Abnormal  -- 99 % 28 2 L/min Nasal cannula Lying   11/15/23 1300 -- 66 20 181/87 Abnormal  -- 97 % 28 2 L/min Nasal cannula Lying   11/15/23 1145 -- 66 20 179/86 Abnormal  -- 97 % 28 2 L/min Nasal cannula Lying   11/15/23 1041 -- -- -- -- -- -- -- -- Nasal cannula --   11/15/23 1024 -- -- -- 187/87 Abnormal  -- -- -- -- -- --   11/15/23 1022 98.1 °F (36.7 °C) 66 16 -- -- 99 % 28 2 L/min Nasal cannula Lying     Pertinent Labs/Diagnostic Test Results:   CT high volume bleeding scan abdomen pelvis   Final Result by Liane Lefort, MD (11/15 1502)   Stool throughout the colon with some hyperdense stool material. Motion artifact limit evaluation   No obvious CT evidence of active high volume gastrointestinal hemorrhage. Correlate for hemorrhoids given rectal bleeding history. GI consult recommended.       Workstation performed: [id number]               Results from last 7 days   Lab Units 11/16/23  5537 11/15/23  1543 11/15/23  1100   WBC Thousand/uL 6.93  --  4.95   HEMOGLOBIN g/dL 12.5 11.8* 13.3   HEMATOCRIT % 40.8 39.7 45.2   PLATELETS Thousands/uL 124*  --  124*   BANDS PCT %  --   --  1         Results from last 7 days   Lab Units 11/16/23  0503 11/15/23  1300   SODIUM mmol/L 143 145   POTASSIUM mmol/L 3.8 4.0   CHLORIDE mmol/L 98 99   CO2 mmol/L 42* >45*   ANION GAP mmol/L 3  --    BUN mg/dL 18 21   CREATININE mg/dL 0.79 0.95   EGFR ml/min/1.73sq m 81 72   CALCIUM mg/dL 9.3 9.5     Results from last 7 days   Lab Units 11/15/23  1300   AST U/L 20   ALT U/L 15   ALK PHOS U/L 80   TOTAL PROTEIN g/dL 7.0   ALBUMIN g/dL 3.8   TOTAL BILIRUBIN mg/dL 0.40         Results from last 7 days   Lab Units 11/16/23  0503 11/15/23  1300   GLUCOSE RANDOM mg/dL 79 95               Results from last 7 days   Lab Units 11/15/23  1300   LIPASE u/L 32             ED Treatment:   Medication Administration from 11/15/2023 1004 to 11/15/2023 1738         Date/Time Order Dose Route Action Comments     11/15/2023 1405 EST iohexol (OMNIPAQUE) 350 MG/ML injection (SINGLE-DOSE) 100 mL 100 mL Intravenous Given --     11/15/2023 1517 EST labetalol (NORMODYNE) injection 5 mg 5 mg Intravenous Given 75bpm     11/15/2023 1545 EST polyethylene glycol (GOLYTELY) bowel prep 4,000 mL 4,000 mL Oral Given --            Present on Admission:   Rectal bleeding   Chronic diastolic CHF (congestive heart failure) (HCC)   Chronic respiratory failure with hypoxia (HCC)   Thrombocytopenia (HCC)   (Resolved) Hypertension   Benign essential hypertension   Chronic obstructive pulmonary disease (HCC)      Admitting Diagnosis: Rectal bleeding [K62.5]  Constipation [K59.00]  Age/Sex: 82 y.o. male  Admission Orders:  Scheduled Medications:  brimonidine tartrate, 1 drop, Both Eyes, Q8H KIKE  docusate sodium, 100 mg, Oral, BID  fluticasone-vilanterol, 1 puff, Inhalation, Daily  formoterol, 20 mcg, Nebulization, BID  losartan, 25 mg, Oral, BID  montelukast, 10 mg, Oral, HS  pantoprazole, 40 mg, Oral, Early Morning  polyethylene glycol, 17 g, Oral, BID      Continuous IV Infusions:     PRN Meds:  acetaminophen, 650 mg, Oral, Q6H PRN  albuterol, 2.5 mg, Nebulization, Q6H PRN  hydrALAZINE, 10 mg, Intravenous, Q4H PRN  ondansetron, 4 mg, Intravenous, Q6H PRN        IP CONSULT TO GASTROENTEROLOGY    Network Utilization Review Department  ATTENTION: Please call with any questions or concerns to [phone] and carefully listen to the prompts so that you are directed to the right person. All voicemails are confidential.   For Discharge needs, contact Care Management DC Support Team at [phone] opt. 2  Send all requests for admission clinical reviews, approved or denied determinations and any other requests to dedicated fax number below belonging to the campus where the patient is receiving treatment.  List of dedicated fax numbers for the Facilities:  [place] DENIALS (Administrative/Medical Necessity) [phone]   DISCHARGE SUPPORT TEAM (NETWORK) [id number] [address] (Maternity/NICU/Pediatrics) [phone]   [address] [place] [address] Drive [address] [facility] [address] [facility] [phone]   [address] [facility] [address] [facility] [address] [facility] [address] [address] [facility] [address] [facility] [address] [facility]  [address]  [phone]

## 2023-11-16 NOTE — DISCHARGE INSTR - AVS FIRST PAGE
Take a stool softener like Colace twice a day  Take MiraLAX twice a day  Both of these medications are over-the-counter

## 2023-11-16 NOTE — PROGRESS NOTES
Progress Note- Louann Alarcon 82 y.o. male MRN: [id number]    Unit/Bed#: E5 -02 Encounter: [id number]      Assessment and Plan:    30-year-old with COPD on 2 L home oxygen, DVT, CAD currently not on any antiplatelets or anticoagulants admitted with 1 episode hematochezia associated with constipation and straining to have bowel movements. Patient was given a bowel prep overnight. He has multiple bowel movements since then without any blood in stool. Hemoglobin normal and stable. Otherwise asymptomatic. CT scan showed high stool burden. Electrolytes normal.  Lipase normal.  TSH normal.  He has never had colonoscopy in the past.     1.  Hematochezia  2. Constipation  3. DVT  4.  CAD     Likely cause of patient's GI bleed is hemorrhoids due to uncontrolled constipation. Other differentials include rectal polyps or tumors, anal fissure. Currently no ongoing GI bleed. Hemoglobin normal and stable. We will start patient on MiraLAX twice daily and docusate twice daily for home bowel regimen to control constipation. If he has further blood in stool, he would benefit from hemorrhoidal cream topical treatment. He is agreeable to get colonoscopy on outpatient basis. We will message staff to arrange that procedure for him. Increase fiber in diet. Follow-up in the GI office. Patient can be discharged from GI standpoint. Shahla Ca MD  Gastroenterology  [address] [place] [address]  Date: November 16, 2023  ______________________________________________________________________    Subjective:     Patient had multiple bowel movements overnight. No abdominal pain. No further episodes of blood in stool.     Medication Administration - last 24 hours from 11/15/2023 1632 to 11/16/2023 1632         Date/Time Order Dose Route Action Action by     11/16/2023 8859 EST polyethylene glycol (MIRALAX) packet 17 g 17 g Oral Given Nathalie De La Cruz RN     11/15/2023 2140 EST polyethylene glycol (Marissa Pare) packet 17 g 17 g Oral Given Karrie Sterling RN     11/16/2023 3489 EST docusate sodium (COLACE) capsule 100 mg 100 mg Oral Given Addie Hayes RN     11/15/2023 1924 EST docusate sodium (COLACE) capsule 100 mg 100 mg Oral Given Addie Hayes RN     11/16/2023 1345 EST brimonidine tartrate 0.2 % ophthalmic solution 1 drop 1 drop Both Eyes Given Addie Hayes RN     11/16/2023 2814 EST brimonidine tartrate 0.2 % ophthalmic solution 1 drop 1 drop Both Eyes Given Karrie Sterling RN     11/15/2023 2138 EST brimonidine tartrate 0.2 % ophthalmic solution 1 drop 1 drop Both Eyes Given Karrie Sterling RN     11/16/2023 1746 EST formoterol (PERFOROMIST) nebulizer solution 20 mcg 20 mcg Nebulization Given Loida Miller, RT     11/15/2023 2022 EST formoterol (PERFOROMIST) nebulizer solution 20 mcg 20 mcg Nebulization Given Brent Anderson, RT     11/16/2023 0853 EST losartan (COZAAR) tablet 25 mg 25 mg Oral Given Addie Hayes RN     11/15/2023 2138 EST losartan (COZAAR) tablet 25 mg 25 mg Oral Given Karrie Sterlnig RN     11/15/2023 2138 EST montelukast (SINGULAIR) tablet 10 mg 10 mg Oral Given Karrie Sterling RN     11/16/2023 6770 EST pantoprazole (PROTONIX) EC tablet 40 mg 40 mg Oral Given Karrie Sterling RN     11/16/2023 0853 EST fluticasone-vilanterol 200-25 mcg/actuation 1 puff 1 puff Inhalation Given Addie Hayes RN     11/15/2023 2319 EST hydrALAZINE (APRESOLINE) injection 10 mg 10 mg Intravenous Given Karrie Sterling RN            Objective:     Vitals: Blood pressure (!) 174/87, pulse 67, temperature 98 °F (36.7 °C), resp. rate 18, height 5' 3" (1.6 m), weight 65.4 kg (144 lb 2.9 oz), SpO2 95 %. ,Body mass index is 25.54 kg/m². Intake/Output Summary (Last 24 hours) at 11/16/2023 1632  Last data filed at 11/16/2023 8965  Gross per 24 hour   Intake 0 ml   Output --   Net 0 ml       Physical Exam:   General Appearance:   Alert, cooperative, no distress   HEENT:   Normocephalic, atraumatic, anicteric.      Neck: Supple, symmetrical, trachea midline   Lungs:   Clear to auscultation bilaterally; no rales, rhonchi or wheezing; respirations unlabored    Heart[de-identified]   Regular rate and rhythm; no murmur, rub, or gallop.    Abdomen:   Soft, non-tender, non-distended; normal bowel sounds; no masses, no organomegaly    Genitalia:   Deferred    Rectal:   Deferred    Extremities:  No cyanosis, clubbing or edema    Pulses:  2+ and symmetric all extremities    Skin:  No jaundice, rashes, or lesions    Lymph nodes:  No palpable cervical lymphadenopathy          Invasive Devices       None                   Lab Results:  Admission on 11/15/2023, Discharged on 11/16/2023   Component Date Value    Ventricular Rate 11/15/2023 67     Atrial Rate 11/15/2023 67     SD Interval 11/15/2023 174     QRSD Interval 11/15/2023 86     QT Interval 11/15/2023 400     QTC Interval 11/15/2023 422     P Axis 11/15/2023 69     QRS [place] 11/15/2023 84     T Wave [place] 11/15/2023 46     WBC 11/15/2023 4.95     RBC 11/15/2023 4.24     Hemoglobin 11/15/2023 13.3     Hematocrit 11/15/2023 45.2     MCV 11/15/2023 107 (H)     MCH 11/15/2023 31.4     MCHC 11/15/2023 29.4 (L)     RDW 11/15/2023 13.2     MPV 11/15/2023 10.1     Platelets 97/89/7467 124 (L)     Sodium 11/15/2023 145     Potassium 11/15/2023 4.0     Chloride 11/15/2023 99     CO2 11/15/2023 >45 (HH)     ANION GAP 11/15/2023      BUN 11/15/2023 21     Creatinine 11/15/2023 0.95     Glucose 11/15/2023 95     Calcium 11/15/2023 9.5     AST 11/15/2023 20     ALT 11/15/2023 15     Alkaline Phosphatase 11/15/2023 80     Total Protein 11/15/2023 7.0     Albumin 11/15/2023 3.8     Total Bilirubin 11/15/2023 0.40     eGFR 11/15/2023 72     Lipase 11/15/2023 32     EXT Fecal Occult Blood 11/15/2023 Positive (A)     Control 11/15/2023 Valid     Segmented % 11/15/2023 47     Bands % 11/15/2023 1     Lymphocytes % 11/15/2023 33     Monocytes % 11/15/2023 13 (H)     Eosinophils, % 11/15/2023 6     Basophils % 11/15/2023 0 Absolute Neutrophils 11/15/2023 2.38     Lymphocytes Absolute 11/15/2023 1.63     Monocytes Absolute 11/15/2023 0.64     Eosinophils Absolute 11/15/2023 0.30     Basophils Absolute 11/15/2023 0.00     Platelet Estimate 77/80/4878 Borderline (A)     Macrocytes 11/15/2023 Present     Hemoglobin 11/15/2023 11.8 (L)     Hematocrit 11/15/2023 39.7     Sodium 11/16/2023 143     Potassium 11/16/2023 3.8     Chloride 11/16/2023 98     CO2 11/16/2023 42 (H)     ANION GAP 11/16/2023 3     BUN 11/16/2023 18     Creatinine 11/16/2023 0.79     Glucose 11/16/2023 79     Calcium 11/16/2023 9.3     eGFR 11/16/2023 81     WBC 11/16/2023 6.93     RBC 11/16/2023 3.97     Hemoglobin 11/16/2023 12.5     Hematocrit 11/16/2023 40.8     MCV 11/16/2023 103 (H)     MCH 11/16/2023 31.5     MCHC 11/16/2023 30.6 (L)     RDW 11/16/2023 13.2     Platelets 50/45/1426 124 (L)     MPV 11/16/2023 9.3     TSH 3RD GENERATON 11/16/2023 0.690        Imaging Studies: I have personally reviewed pertinent imaging studies.

## 2023-11-16 NOTE — ASSESSMENT & PLAN NOTE
Presented with multiple large episodes of bright red blood per rectum  CT imaging without evidence of bleed, does show hyperdense stool material  GI evaluated, plan to monitor tonight, consider colonoscopy if h/h drops  H/H 13.3 on admission, repeat at 11.8  NPO midnight  Golytely prep  Monitor H/H, transfuse for hemoglobin less than 7

## 2023-11-16 NOTE — H&P
[facility]  H&P  Name: Tamara Pierre 82 y.o. male I MRN: [id number]  Unit/Bed#: E5 -02 I Date of Admission: 11/15/2023   Date of Service: 11/15/2023 I Hospital Day: 0      Assessment/Plan   Chronic obstructive pulmonary disease ([address])  Assessment & Plan  Continue home bronchodilator and nebs    Chronic diastolic CHF (congestive heart failure) ([address])  Assessment & Plan  Wt Readings from Last 3 Encounters:   11/15/23 65.4 kg (144 lb 2.9 oz)   10/25/23 62.2 kg (137 lb 3.2 oz)   09/28/23 57.6 kg (127 lb)     Euvoelmic, no on diuretics          Thrombocytopenia (HCC)  Assessment & Plan  Chronic, with episodes of rectal bleeding  Platelets appears stable    Chronic respiratory failure with hypoxia (HCC)  Assessment & Plan  At baseline 2-3L NC    Benign essential hypertension  Assessment & Plan  Elevated on admission  Continue losartan 25mg BID  Add IV hydralyzine prn    * Rectal bleeding  Assessment & Plan  Presented with multiple large episodes of bright red blood per rectum  CT imaging without evidence of bleed, does show hyperdense stool material  GI evaluated, plan to monitor tonight, consider colonoscopy if h/h drops  H/H 13.3 on admission, repeat at 11.8  NPO midnight  [facility] prep  Monitor H/H, transfuse for hemoglobin less than 7         VTE Prophylaxis:  none, rectal bleed   / sequential compression device   Code Status: FC  POLST: There is no POLST form on file for this patient (pre-hospital)  Discussion with family: wife bedside    Anticipated Length of Stay:  Patient will be admitted on an Observation basis with an anticipated length of stay of 2 midnights. Justification for Hospital Stay: GI evaluation for colonoscopy, monitor H/H    Chief Complaint:   Rectal Bleeding    History of Present Illness:    Tamara Pierre is a 82 y.o. male who presents with rectal bleeding.  Presented with history for constipation for several weeks, today had multiple episodes of rectal bleeding. Hemoglobin was 13.3 and repeat at 11.8. Denies any abdominal pain, nausea or vomiting. No further bleeding since admission. Has a history of chronic thrombocytopenia which may be potentiating bleed. Review of Systems:    Review of Systems   Gastrointestinal:  Positive for anal bleeding. All other systems reviewed and are negative. Past Medical and Surgical History:     Past Medical History:   Diagnosis Date    Acute metabolic encephalopathy 17/03/1316    Acute on chronic respiratory failure ([address]) 12/4/2022    Age-related cataract of right eye 4/4/2023    patient is medically cleared and presents with low risk of complication with this upcoming R cataract surgery.     Anemia     Aneurysm, aorta, thoracic (HCC)     Appendicolith     Ascending aortic aneurysm (HCC)     3.7    Asthma     BPH (benign prostatic hyperplasia)     CAD (coronary artery disease)     noted on CT scan    CHF (congestive heart failure) (HCC)     COPD (chronic obstructive pulmonary disease) ([address])     Descending thoracic aortic aneurysm ([address])     Diabetes mellitus ([address])     Diverticulosis     Former tobacco use     GERD (gastroesophageal reflux disease)     History of DVT (deep vein thrombosis)     Left leg    History of transfusion     Hypertension     Inguinal hernia     right    Nephrolithiasis     Oxygen dependent     2LNC    Oxygen dependent     Pneumonia     Pre-diabetes     Prostate calculus     PVD (peripheral vascular disease) ([address])     Recurrent right inguinal hernia w incarcertion 7/1/7103    Small bowel obstruction ([address]) 12/28/2022    Thoracic aortic aneurysm without rupture ([address]) 11/19/2018    Added automatically from request for surgery [id number]    Thrombocytopenia ([address]) 12/29/2018    Ulcer     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[address])     Varicose vein of leg     b/l       Past Surgical History:   Procedure Laterality Date    CARDIAC SURGERY      ESOPHAGOGASTRODUODENOSCOPY      HERNIA REPAIR Right 1/21/2019    Procedure: REPAIR HERNIA INGUINAL WITH MESH;  Surgeon: Enoc Barriga MD;  Location: [address] [place] [address] MAIN OR;  Service: General    HERNIA REPAIR Right 7/22/2023    Procedure: REPAIR RECURRENT INCARERATED HERNIA INGUINAL OPEN, RIGHT ORCHIECTOMY;  Surgeon: Casie Luna MD;  Location: [facility] Main OR;  Service: General    INGUINAL HERNIA REPAIR Bilateral     IR TEVAR  12/27/2018    NH EVASC RPR DTA COVERAGE ART ORIGIN 1ST ENDOPROSTH N/A 12/27/2018    Procedure: TEVAR - endovascular thoracic aortic aneurysm repair;  Surgeon: Deedee Marcum MD;  Location: [facility] MAIN OR;  Service: Vascular    THORACIC AORTIC ANEURYSM REPAIR  12/27/2018    VARICOSE VEIN SURGERY Bilateral     vein stripping       Meds/Allergies:    Prior to Admission medications    Medication Sig Start Date End Date Taking?  Authorizing Provider   albuterol (2.5 mg/3 mL) 0.083 % nebulizer solution TAKE 3 ML (2.5 MG TOTAL) BY NEBULIZATION EVERY 6 (SIX) HOURS AS NEEDED FOR WHEEZING OR SHORTNESS OF BREATH 11/13/23  Yes Maribel Leon MD   albuterol (PROVENTIL HFA,VENTOLIN HFA) 90 mcg/act inhaler INHALE 2 PUFFS EVERY 6 (SIX) HOURS AS NEEDED FOR WHEEZING 8/15/23  Yes Kirby Casanova MD   brimonidine tartrate 0.2 % ophthalmic solution  7/6/23  Yes Historical Provider, MD   formoterol (PERFOROMIST) 20 MCG/2ML nebulizer solution Take 2 mL (20 mcg total) by nebulization 2 (two) times a day 8/15/23  Yes Maribel Leon MD   hydroxypropyl methylcellulose (GONAK) 2.5 % ophthalmic solution Apply 1 drop to eye Three times a day 6/12/23  Yes Historical Provider, MD   losartan (COZAAR) 25 mg tablet Take 1 tablet (25 mg total) by mouth 2 (two) times a day 10/25/23  Yes Marissa Duarte PA-C   pantoprazole (PROTONIX) 40 mg tablet TAKE 1 TABLET (40 MG TOTAL) BY MOUTH DAILY 10/4/23  Yes MD Andriy Calvo 200-62.5-25 MCG/ACT AEPB inhaler INHALE 1 PUFF DAILY RINSE MOUTH AFTER USE. 8/15/23  Yes Historical Provider, MD   budesonide (Pulmicort) 0.5 mg/2 mL nebulizer solution Take 2 mL (0.5 mg total) by nebulization 2 (two) times a day Rinse mouth after use. 8/15/23 10/10/23  Romayne Hikes, MD   montelukast (SINGULAIR) 10 mg tablet Take 1 tablet (10 mg total) by mouth daily at bedtime Diana 1 tableta en la noche para las alergias 10/25/23   Shayla Ingram PA-C   predniSONE 5 mg tablet Take 1 tablet (5 mg total) by mouth daily  Patient not taking: Reported on 11/15/2023 7/28/23   Iza Mota MD     I have reviewed home medications with patient personally. Allergies: Allergies   Allergen Reactions    Penicillins Hives, Itching and Rash    Lisinopril Rash     Side pains and rash     Zyprexa [Olanzapine] Tongue Swelling       Social History:     Marital Status: /Civil Union   Occupation:   Patient Pre-hospital Living Situation: home  Patient Pre-hospital Level of Mobility: amb  Patient Pre-hospital Diet Restrictions: none  Substance Use History:   Social History     Substance and Sexual Activity   Alcohol Use Never     Social History     Tobacco Use   Smoking Status Former    Packs/day: 1.00    Years: 35.00    Total pack years: 35.00    Types: Cigarettes    Start date:     Quit date:     Years since quittin.8   Smokeless Tobacco Never     Social History     Substance and Sexual Activity   Drug Use No       Family History:    Family History   Problem Relation Age of Onset    Tuberculosis Mother     No Known Problems Father     Cancer Sister     Diabetes Family     Hypertension Family        Physical Exam:     Vitals:   Blood Pressure: (!) 191/104 (11/15/23 1750)  Pulse: 67 (11/15/23 1750)  Temperature: 97.6 °F (36.4 °C) (11/15/23 1750)  Temp Source: Oral (11/15/23 1750)  Respirations: 16 (11/15/23 1750)  Height: 5' 3" (160 cm) (11/15/23 1750)  Weight - Scale: 65.4 kg (144 lb 2.9 oz) (11/15/23 1750)  SpO2: 96 % (11/15/23 1750)    Physical Exam  Vitals and nursing note reviewed. HENT:      Head: Normocephalic.    Eyes:      Conjunctiva/sclera: Conjunctivae normal.   Cardiovascular:      Rate and Rhythm: Normal rate. Pulmonary:      Effort: Pulmonary effort is normal. No respiratory distress. Comments: Decreased breath sounds  Abdominal:      General: Bowel sounds are normal. There is no distension. Palpations: Abdomen is soft. Musculoskeletal:         General: No swelling. Right lower leg: No edema. Left lower leg: No edema. Skin:     General: Skin is warm and dry. Neurological:      General: No focal deficit present. Mental Status: He is alert. Mental status is at baseline. Psychiatric:         Mood and Affect: Mood normal.       Additional Data:     Lab Results: I have personally reviewed pertinent reports. Results from last 7 days   Lab Units 11/15/23  1543 11/15/23  1100   WBC Thousand/uL  --  4.95   HEMOGLOBIN g/dL 11.8* 13.3   HEMATOCRIT % 39.7 45.2   PLATELETS Thousands/uL  --  124*   BANDS PCT %  --  1   LYMPHO PCT %  --  33   MONO PCT %  --  13*   EOS PCT %  --  6     Results from last 7 days   Lab Units 11/15/23  1300   SODIUM mmol/L 145   POTASSIUM mmol/L 4.0   CHLORIDE mmol/L 99   CO2 mmol/L >45*   BUN mg/dL 21   CREATININE mg/dL 0.95   CALCIUM mg/dL 9.5   ALBUMIN g/dL 3.8   TOTAL BILIRUBIN mg/dL 0.40   ALK PHOS U/L 80   ALT U/L 15   AST U/L 20   GLUCOSE RANDOM mg/dL 95                       Imaging: I have personally reviewed pertinent reports. CT high volume bleeding scan abdomen pelvis   Final Result by Ana Avery MD (11/15 1502)   Stool throughout the colon with some hyperdense stool material. Motion artifact limit evaluation   No obvious CT evidence of active high volume gastrointestinal hemorrhage. Correlate for hemorrhoids given rectal bleeding history. GI consult recommended.       Workstation performed: [id number]             EKG, Pathology, and Other Studies Reviewed on Admission:   EKG: NSR    Allscripts / Epic Records Reviewed: Yes     ** Please Note: This note has been constructed using a voice recognition system.  **

## 2023-11-16 NOTE — PLAN OF CARE
Problem: Potential for Falls  Goal: Patient will remain free of falls  Description: INTERVENTIONS:  - Educate patient/family on patient safety including physical limitations  - Instruct patient to call for assistance with activity   - Consult OT/PT to assist with strengthening/mobility   - Keep Call bell within reach  - Keep bed low and locked with side rails adjusted as appropriate  - Keep care items and personal belongings within reach  - Initiate and maintain comfort rounds  - Make Fall Risk Sign visible to staff  - Offer Toileting every  Hours, in advance of need  - Initiate/Maintain alarm  - Obtain necessary fall risk management equipment:   - Apply yellow socks and bracelet for high fall risk patients  - Consider moving patient to room near nurses station  Outcome: Progressing     Problem: Prexisting or High Potential for Compromised Skin Integrity  Goal: Skin integrity is maintained or improved  Description: INTERVENTIONS:  - Identify patients at risk for skin breakdown  - Assess and monitor skin integrity  - Assess and monitor nutrition and hydration status  - Monitor labs   - Assess for incontinence   - Turn and reposition patient  - Assist with mobility/ambulation  - Relieve pressure over bony prominences  - Avoid friction and shearing  - Provide appropriate hygiene as needed including keeping skin clean and dry  - Evaluate need for skin moisturizer/barrier cream  - Collaborate with interdisciplinary team   - Patient/family teaching  - Consider wound care consult   Outcome: Progressing     Problem: PAIN - ADULT  Goal: Verbalizes/displays adequate comfort level or baseline comfort level  Description: Interventions:  - Encourage patient to monitor pain and request assistance  - Assess pain using appropriate pain scale  - Administer analgesics based on type and severity of pain and evaluate response  - Implement non-pharmacological measures as appropriate and evaluate response  - Consider cultural and social influences on pain and pain management  - Notify physician/advanced practitioner if interventions unsuccessful or patient reports new pain  Outcome: Progressing     Problem: INFECTION - ADULT  Goal: Absence or prevention of progression during hospitalization  Description: INTERVENTIONS:  - Assess and monitor for signs and symptoms of infection  - Monitor lab/diagnostic results  - Monitor all insertion sites, i.e. indwelling lines, tubes, and drains  - Monitor endotracheal if appropriate and nasal secretions for changes in amount and color  - [place] appropriate cooling/warming therapies per order  - Administer medications as ordered  - Instruct and encourage patient and family to use good hand hygiene technique  - Identify and instruct in appropriate isolation precautions for identified infection/condition  Outcome: Progressing  Goal: Absence of fever/infection during neutropenic period  Description: INTERVENTIONS:  - Monitor WBC    Outcome: Progressing     Problem: SAFETY ADULT  Goal: Patient will remain free of falls  Description: INTERVENTIONS:  - Educate patient/family on patient safety including physical limitations  - Instruct patient to call for assistance with activity   - Consult OT/PT to assist with strengthening/mobility   - Keep Call bell within reach  - Keep bed low and locked with side rails adjusted as appropriate  - Keep care items and personal belongings within reach  - Initiate and maintain comfort rounds  - Make Fall Risk Sign visible to staff  - Offer Toileting every  Hours, in advance of need  - Initiate/Maintain alarm  - Obtain necessary fall risk management equipment:   - Apply yellow socks and bracelet for high fall risk patients  - Consider moving patient to room near nurses station  Outcome: Progressing  Goal: Maintain or return to baseline ADL function  Description: INTERVENTIONS:  - Educate patient/family on patient safety including physical limitations  - Instruct patient to call for assistance with activity   - Consult OT/PT to assist with strengthening/mobility   - Keep Call bell within reach  - Keep bed low and locked with side rails adjusted as appropriate  - Keep care items and personal belongings within reach  - Initiate and maintain comfort rounds  - Make Fall Risk Sign visible to staff  - Offer Toileting every  Hours, in advance of need  - Initiate/Maintain alarm  - Obtain necessary fall risk management equipment:   - Apply yellow socks and bracelet for high fall risk patients  - Consider moving patient to room near nurses station  Outcome: Progressing  Goal: Maintains/Returns to pre admission functional level  Description: INTERVENTIONS:  -  Assess patient's ability to carry out ADLs; assess patient's baseline for ADL function and identify physical deficits which impact ability to perform ADLs (bathing, care of mouth/teeth, toileting, grooming, dressing, etc.)  - Assess/evaluate cause of self-care deficits   - Assess range of motion  - Assess patient's mobility; develop plan if impaired  - Assess patient's need for assistive devices and provide as appropriate  - Encourage maximum independence but intervene and supervise when necessary  - Involve family in performance of ADLs  - Assess for home care needs following discharge   - Consider OT consult to assist with ADL evaluation and planning for discharge  - Provide patient education as appropriate  Outcome: Progressing     Problem: DISCHARGE PLANNING  Goal: Discharge to home or other facility with appropriate resources  Description: INTERVENTIONS:  - Identify barriers to discharge w/patient and caregiver  - Arrange for needed discharge resources and transportation as appropriate  - Identify discharge learning needs (meds, wound care, etc.)  - Arrange for interpretive services to assist at discharge as needed  - Refer to Case Management Department for coordinating discharge planning if the patient needs post-hospital services based on physician/advanced practitioner order or complex needs related to functional status, cognitive ability, or social support system  Outcome: Progressing     Problem: Knowledge Deficit  Goal: Patient/family/caregiver demonstrates understanding of disease process, treatment plan, medications, and discharge instructions  Description: Complete learning assessment and assess knowledge base.   Interventions:  - Provide teaching at level of understanding  - Provide teaching via preferred learning methods  Outcome: Progressing stents x3 stents x3/None

## 2023-11-16 NOTE — CASE MANAGEMENT
Case Management Assessment & Discharge Planning Note    Patient name Fransisca Mcburney  Location [place] [address] /[address] [place]-* MRN [id number]  : 1935 Date 2023       Current Admission Date: 11/15/2023  Current Admission Diagnosis:Rectal bleeding   Patient Active Problem List    Diagnosis Date Noted    Prediabetes 10/25/2023    Bilateral hearing loss 10/25/2023    PAC (premature atrial contraction) 2023    Hypoxemia 2023    Vitamin B12 deficiency 2023    Vitamin D deficiency 2023    Chronic kidney disease, stage 3a ([address]) 2023    Weight loss 2022    Supplemental oxygen dependent 2022    Rectal bleeding 2021    Hyperlipidemia 2021    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    Dysphagia 2020    TIA (transient ischemic attack) 2020    S/P hernia repair 2019    Acquired renal cyst of left kidney 2019    Chronic diastolic CHF (congestive heart failure) ([address]) 2019    Thrombocytopenia ([address]) 2018    Varicose veins of both lower extremities with pain 2018    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HCC) 2018    Chronic respiratory failure with hypoxia ([address]) 2018    Descending aortic aneurysm ([address]) 2018    History of DVT (deep vein thrombosis) 2018    Chronic obstructive pulmonary disease ([address]) 2018    Benign essential hypertension 2017      LOS (days): 0  Geometric Mean LOS (GMLOS) (days):   Days to GMLOS:     OBJECTIVE:              Current admission status: Observation       Preferred Pharmacy:   [address] [place] [address] [address] [address]  Phone: [phone] Fax: [address] [place] [address] [address] [facility] [place] [address]  Phone: [phone] Fax: [phone]    Primary Care Provider: Richar Langford MD    Primary Insurance: [id number] RE[facility]  Secondary Insurance: Sandee Figueroa    ASSESSMENT:  Active Health Care Proxies    There are no active Health Care Proxies on file. Readmission Root Cause  30 Day Readmission: No    Patient Information  Admitted from[de-identified] Home  Mental Status: Alert  During Assessment patient was accompanied by: Son, Spouse  Assessment information provided by[de-identified] Son  Primary Caregiver: Self  Support Systems: Spouse/significant other, Family members, Children, Self  Home entry access options.  Select all that apply.: Stairs  Number of steps to enter home.: 5  Type of Current Residence: 2 story home  Living Arrangements: Lives w/ Spouse/significant other    Activities of Daily Living Prior to Admission  Functional Status: Assistance  Completes ADLs independently?: No  Level of ADL dependence: Assistance  Ambulates independently?: No  Level of ambulatory dependence: Assistance  Does patient use assisted devices?: Yes  Assisted Devices (DME) used: Portable Oxygen concentrator, Portable Oxygen tanks, Straight Earlene Ro, [place]  DME Company Name (respiratory supplies): [employer]  O2 Rate(s): 3LPM  Does patient currently own DME?: Yes  What DME does the patient currently own?: Portable Oxygen concentrator, Portable Oxygen tanks, Shower Chair, Straight Cane  Does patient have a history of Outpatient Therapy (PT/OT)?: No  Does the patient have a history of Short-Term Rehab?: No  Does patient have a history of HHC?: No  Does patient currently have [facility]?: No         Patient Information Continued  Income Source: Pension/penitentiary         [employer] [place] [employer] of Transport to Appts[de-identified] Family transport      Housing Stability: [address]  (4/11/2023)    Housing Stability Vital Sign     Unable to Pay for Housing in the Last Year: No     Number of Places Lived in the Last Year: 1     Unstable Housing in the Last Year: No   Food Insecurity: No Food Insecurity (4/11/2023)    Hunger Vital Sign     Worried About Running Out of Food in the Last Year: Never true     Ran Out of Food in the Last Year: Never true   Transportation Needs: Unmet Transportation Needs (10/25/2023)    PRAPARE - Transportation     Lack of Transportation (Medical): Yes     Lack of Transportation (Non-Medical): Yes   Utilities: Not on file       DISCHARGE DETAILS:    Discharge planning discussed with[de-identified] Patient, Son Jose Gordon and Spouse  Freedom of Choice: Yes     CM contacted family/caregiver?: Yes  Were Treatment Team discharge recommendations reviewed with patient/caregiver?: Yes  Did patient/caregiver verbalize understanding of patient care needs?: Yes  Were patient/caregiver advised of the risks associated with not following Treatment Team discharge recommendations?: Yes    Contacts  Patient Contacts: Melissa Odom  [phone]  Relationship to Patient[de-identified] Family  Contact Method: In Person  Reason/Outcome: Continuity of Care, Emergency Contact, Discharge [address]         Is the patient interested in [facility] [address] at discharge?: No    DME Referral Provided  Referral made for DME?: No         Additional Comments:  spoke with patient and family at bedside. Family states he has all oxygen supplies but needs a new oxygen tank because it is broken. Patient gets oxygen supplies from [facility]. CM placed a call to [facility] [phone] for them to bring a new tank to the hospital so patient is able to be discharged. CM discussed this with family. CM educated family that patient needs home health physical therapy and they would like to bring patient to outpatient physical therapy. Cm spoke with someone from [facility] and they will be delivering an oxygen tank to patient's hospital room.

## 2023-11-16 NOTE — ASSESSMENT & PLAN NOTE
Wt Readings from Last 3 Encounters:   11/15/23 65.4 kg (144 lb 2.9 oz)   10/25/23 62.2 kg (137 lb 3.2 oz)   09/28/23 57.6 kg (127 lb)     Euvoelmic, no on diuretics

## 2023-11-16 NOTE — ASSESSMENT & PLAN NOTE
Wt Readings from Last 3 Encounters:   11/15/23 65.4 kg (144 lb 2.9 oz)   10/25/23 62.2 kg (137 lb 3.2 oz)   09/28/23 57.6 kg (127 lb)     Euvolemic, not on diuretics at baseline

## 2023-11-16 NOTE — RESTORATIVE TECHNICIAN NOTE
Restorative Technician Note      Patient Name: Ratna Hernandez     Restorative Tech Visit Date: 11/16/23  Note Type: Mobility  Patient Position Upon Consult: Standing  Activity Performed: Ambulated  Patient Position at End of Consult: Standing;  All needs within reach

## 2023-11-16 NOTE — DISCHARGE SUMMARY
[address] [facility]  Discharge- Chago Poe [birth year], 82 y.o. male MRN: [id number]  Unit/Bed#: E5 -02 Encounter: [id number]  Primary Care Provider: Edith Chi MD   Date and time admitted to hospital: 11/15/2023 10:04 AM    * Rectal bleeding  Assessment & Plan  Presented with multiple large episodes of bright red blood per rectum  CT imaging without evidence of bleed, does show hyperdense stool material  GI evaluated and felt to be related to hemorrhoids in nature  Given bowel prep and noted to have improvement in constipation and stools were noted to be brown and nonbloody  Hemoglobin stable and therefore we will proceed with discharge and outpatient colonoscopy for colon cancer screening and MiraLAX and Colace twice daily on discharge    Benign essential hypertension  Assessment & Plan  Elevated on admission  Continue losartan 25mg BID  Currently ranging 130s to 170s    Chronic respiratory failure with hypoxia ([address])  Assessment & Plan  At baseline 2-3L NC  Case management to investigate portable oxygen needs    Chronic obstructive pulmonary disease ([address])  Assessment & Plan  Continue home bronchodilator and nebs on discharge    Chronic diastolic CHF (congestive heart failure) ([address])  Assessment & Plan  Wt Readings from Last 3 Encounters:   11/15/23 65.4 kg (144 lb 2.9 oz)   10/25/23 62.2 kg (137 lb 3.2 oz)   09/28/23 57.6 kg (127 lb)     Euvolemic, not on diuretics at baseline      Thrombocytopenia (HCC)  Assessment & Plan  Chronic  Platelets appears stable and at baseline of 100s      Medical Problems       Resolved Problems  Date Reviewed: 11/16/2023            Resolved    Hypertension 1/18/2023     Resolved by  Taina Hong MD    Overview Signed 12/9/2022 10:40 AM by Edith Chi MD     Formatting of this note might be different from the original.  Last Assessment & Plan:   Patient hypotensive overnight after procedure requiring IV fluid boluses.   Needs furosemide for congestive heart failure. Can continue metoprolol but decrease losartan to 50 mg. Advised to discontinue amlodipine. Discharging Physician / Practitioner: Eli Valentin PA-C  PCP: Gina Grover MD  Admission Date:   Admission Orders (From admission, onward)       Ordered        11/15/23 1620  Place in Observation  Once                          Discharge Date: 11/16/23    Consultations During Hospital Stay:  GI    Procedures Performed:   none    Significant Findings / Test Results:   CT: Stool throughout the colon with some hyperdense stool material. Motion artifact limit evaluation. No obvious CT evidence of active high volume gastrointestinal hemorrhage. Correlate for hemorrhoids given rectal bleeding history. GI consult recommended. Incidental Findings:   none    Test Results Pending at Discharge (will require follow up):   TSH     Outpatient Tests Requested: Follow up with PCP and GI for outpt colonoscopy  Hgb one week with PCP    Complications:  none    Reason for Admission: rectal bleeding    Hospital Course:   Terry Patel is a 82 y.o. male patient who originally presented to the hospital on 11/15/2023 due to rectal bleeding. Has past medical history of COPD chronically on 2 L nasal cannula, history of DVT, coronary artery disease, hypertension, hyperlipidemia who was noted to present with rectal bleeding. He had dark red blood per rectum noted on the bed by his wife  the morning of admission. Patient was subsequently admitted and was seen in consultation by GI. He had significant constipation over the last several weeks with inability to move his bowels despite significant straining. Patient has never had a prior colonoscopy. He had had prior rectal bleeding in the past felt to be related to hemorrhoids. CAT scan was performed which was noted to show hyperdense stool material throughout the colon. No obvious evidence of active high volume GI bleeding.     The patient was noted to be seen in consultation by GI, recommended for GoLytely prep to treat severe constipation and to maintain MiraLAX twice daily and Colace twice daily as an outpatient. Patient's hemoglobin was stable and had brown multiple stools overnight and therefore was deemed stable for discharge with outpatient colonoscopy for colon cancer screening. Please see above list of diagnoses and related plan for additional information. Condition at Discharge: stable    Discharge Day Visit / Exam:   Subjective: No nausea or vomiting. Per nursing staff the patient in 3-4 bowel movements since last night which were brown in color. No further bleeding. Vitals: Blood Pressure: (!) 174/87 (11/16/23 0656)  Pulse: 67 (11/16/23 0656)  Temperature: 98 °F (36.7 °C) (11/16/23 0656)  Temp Source: Oral (11/16/23 0017)  Respirations: 18 (11/16/23 0656)  Height: 5' 3" (160 cm) (11/15/23 1750)  Weight - Scale: 65.4 kg (144 lb 2.9 oz) (11/15/23 1750)  SpO2: 95 % (11/16/23 0706)  Exam:   Physical Exam  Vitals and nursing note reviewed. Constitutional:       General: He is not in acute distress. Appearance: Normal appearance. He is not diaphoretic. Comments: Uruguayan-speaking. Son provided  services. HENT:      Head: Normocephalic and atraumatic. Cardiovascular:      Rate and Rhythm: Normal rate and regular rhythm. Pulmonary:      Breath sounds: No stridor. No wheezing, rhonchi or rales. Comments: Decreased air movement throughout  Abdominal:      General: Bowel sounds are normal.      Palpations: Abdomen is soft. There is no mass. Tenderness: There is no abdominal tenderness. There is no guarding. Musculoskeletal:      Right lower leg: No edema. Left lower leg: No edema. Skin:     General: Skin is warm and dry. Neurological:      Mental Status: He is alert.    Psychiatric:         Mood and Affect: Mood normal.         Behavior: Behavior normal.          Discussion with Family: Updated contact person (wife and son) at bedside. Discharge instructions/Information to patient and family:   See after visit summary for information provided to patient and family. Provisions for Follow-Up Care:  See after visit summary for information related to follow-up care and any pertinent home health orders. Mobility at time of Discharge:   Basic Mobility Inpatient Raw Score: 18  JH-HLM Goal: 6: Walk 10 steps or more  JH-HLM Achieved: 6: Walk 10 steps or more  HLM Goal achieved. Continue to encourage appropriate mobility. Disposition:   Home    Planned Readmission: no     Discharge Statement:  I spent 44 minutes discharging the patient. This time was spent on the day of discharge. I had direct contact with the patient on the day of discharge. Greater than 50% of the total time was spent examining patient, answering all patient questions, arranging and discussing plan of care with patient as well as directly providing post-discharge instructions. Additional time then spent on discharge activities. Discharge Medications:  See after visit summary for reconciled discharge medications provided to patient and/or family.       **Please Note: This note may have been constructed using a voice recognition system**

## 2023-11-16 NOTE — ASSESSMENT & PLAN NOTE
Presented with multiple large episodes of bright red blood per rectum  CT imaging without evidence of bleed, does show hyperdense stool material  GI evaluated and felt to be related to hemorrhoids in nature  Given bowel prep and noted to have improvement in constipation and stools were noted to be brown and nonbloody  Hemoglobin stable and therefore we will proceed with discharge and outpatient colonoscopy for colon cancer screening and MiraLAX and Colace twice daily on discharge

## 2023-11-17 ENCOUNTER — TRANSITIONAL CARE MANAGEMENT (OUTPATIENT)
Dept: FAMILY MEDICINE CLINIC | Facility: CLINIC | Age: 88
End: 2023-11-17

## 2023-11-21 ENCOUNTER — OFFICE VISIT (OUTPATIENT)
Dept: FAMILY MEDICINE CLINIC | Facility: CLINIC | Age: 88
End: 2023-11-21
Payer: MEDICARE

## 2023-11-21 ENCOUNTER — TELEPHONE (OUTPATIENT)
Dept: FAMILY MEDICINE CLINIC | Facility: CLINIC | Age: 88
End: 2023-11-21

## 2023-11-21 VITALS
WEIGHT: 135 LBS | HEIGHT: 63 IN | RESPIRATION RATE: 17 BRPM | TEMPERATURE: 97.2 F | HEART RATE: 74 BPM | DIASTOLIC BLOOD PRESSURE: 70 MMHG | OXYGEN SATURATION: 84 % | SYSTOLIC BLOOD PRESSURE: 140 MMHG | BODY MASS INDEX: 23.92 KG/M2

## 2023-11-21 DIAGNOSIS — K59.00 CONSTIPATION, UNSPECIFIED CONSTIPATION TYPE: ICD-10-CM

## 2023-11-21 DIAGNOSIS — J96.11 CHRONIC RESPIRATORY FAILURE WITH HYPOXIA (HCC): ICD-10-CM

## 2023-11-21 DIAGNOSIS — D69.6 THROMBOCYTOPENIA (HCC): ICD-10-CM

## 2023-11-21 DIAGNOSIS — J44.9 CHRONIC OBSTRUCTIVE PULMONARY DISEASE, UNSPECIFIED COPD TYPE (HCC): ICD-10-CM

## 2023-11-21 DIAGNOSIS — H90.3 SENSORINEURAL HEARING LOSS (SNHL) OF BOTH EARS: ICD-10-CM

## 2023-11-21 DIAGNOSIS — I10 BENIGN ESSENTIAL HYPERTENSION: ICD-10-CM

## 2023-11-21 DIAGNOSIS — K62.5 RECTAL BLEEDING: Primary | ICD-10-CM

## 2023-11-21 PROCEDURE — 99496 TRANSJ CARE MGMT HIGH F2F 7D: CPT | Performed by: PHYSICIAN ASSISTANT

## 2023-11-21 RX ORDER — PREDNISONE 5 MG/1
5 TABLET ORAL DAILY
Qty: 30 TABLET | Refills: 1 | Status: SHIPPED | OUTPATIENT
Start: 2023-11-21

## 2023-11-21 RX ORDER — LOSARTAN POTASSIUM 25 MG/1
50 TABLET ORAL 2 TIMES DAILY
Qty: 180 TABLET | Refills: 1 | Status: SHIPPED | OUTPATIENT
Start: 2023-11-21

## 2023-11-21 RX ORDER — POLYETHYLENE GLYCOL 3350 17 G/17G
17 POWDER, FOR SOLUTION ORAL DAILY
Qty: 225 G | Refills: 1 | Status: SHIPPED | OUTPATIENT
Start: 2023-11-21

## 2023-11-21 NOTE — ASSESSMENT & PLAN NOTE
Chronic use of prednisone 5mg for palliative measure. Patient aware of and accepts risk. Continue home budesonide and formoterol nebulizer and Trelegy inhaler.

## 2023-11-21 NOTE — ASSESSMENT & PLAN NOTE
Lab Results   Component Value Date    WBC 6.93 11/16/2023    HGB 12.5 11/16/2023    HCT 40.8 11/16/2023     (H) 11/16/2023     (L) 11/16/2023     Stable, repeat in 1 week.

## 2023-11-21 NOTE — ASSESSMENT & PLAN NOTE
Reviewed recent admission from 11/15/23 to 11/16/23. No longer with acute bleeding, hgb normalized upon discharge, repeat CBC next week. Suspect related to hemorrhoids, consider colonoscopy outpatient, will hold due to age and patient preference. Recommend continue miralax and colace BID.

## 2023-11-21 NOTE — PROGRESS NOTES
Assessment & Plan     1. Rectal bleeding  Assessment & Plan:  Reviewed recent admission from 11/15/23 to 11/16/23. No longer with acute bleeding, hgb normalized upon discharge, repeat CBC next week. Suspect related to hemorrhoids, consider colonoscopy outpatient, will hold due to age and patient preference. Recommend continue miralax and colace BID. Orders:  -     CBC and differential; Future    2. Constipation, unspecified constipation type  Assessment & Plan:  Recommend continue miralax and Colace. Orders:  -     polyethylene glycol (GLYCOLAX) 17 GM/SCOOP powder; Take 17 g by mouth daily    3. Chronic obstructive pulmonary disease, unspecified COPD type ([address])  Assessment & Plan:  Chronic use of prednisone 5mg for palliative measure. Patient aware of and accepts risk. Continue home budesonide and formoterol nebulizer and Trelegy inhaler. Orders:  -     predniSONE 5 mg tablet; Take 1 tablet (5 mg total) by mouth daily    4. Chronic respiratory failure with hypoxia (HCC)  Assessment & Plan:  Stable on baseline home NC 2-3L O2.       5. Benign essential hypertension  Assessment & Plan:  Elevated BP at home, will increase losartan to 50mg BID today. Son to continue monitor BP at home. Orders:  -     losartan (COZAAR) 25 mg tablet; Take 2 tablets (50 mg total) by mouth 2 (two) times a day    6. Sensorineural hearing loss (SNHL) of both ears  Assessment & Plan:  Plan for ENT consult, son to schedule. Lost his hearing aids from [employer]. 7. Thrombocytopenia (HCC)  Assessment & Plan:  Lab Results   Component Value Date    WBC 6.93 11/16/2023    HGB 12.5 11/16/2023    HCT 40.8 11/16/2023     (H) 11/16/2023     (L) 11/16/2023     Stable, repeat in 1 week. Subjective     Transitional Care Management Review:   Jazlyn Dos Santos is a 82 y.o. male here for TCM follow up.      During the TCM phone call patient stated:  TCM Call     Date and time call was made  11/17/2023  2:03 PM Hospital care reviewed  Records reviewed    Patient was hospitialized at  [facility] CL[facility]    Date of Admission  11/15/23    Date of discharge  11/16/23    Diagnosis  Chronic obstructive pulmonary disease with acute exacerbation    Disposition  Home    Current Symptoms  None      TCM Call     Post hospital issues  None    Should patient be enrolled in anticoag monitoring? No    Scheduled for follow up? Yes    Patients specialists  [facility]    Did you obtain your prescribed medications  Yes    Do you need help managing your prescriptions or medications  No    Is transportation to your appointment needed  No    I have advised the patient to call PCP with any new or worsening symptoms  Ana Lilia Gaelas RMANASTACIA    Living Arrangements  Family members    Have you fallen in the last 12 months  No    Interperter language line needed  No        Berenice Mims is a 82 y.o. male with a h/o COPD, former smoker, chronic hypoxia on home oxygen, CHF, recent cataract extraction with lens replacement in 4/2023, recent recurrent hernia repair due to incarceration and SBO in 7/2023 who presents after recent admission from 11/15/23 to 11/16/23 due to rectal bleeding with significant constipation. Difficulty hearing. Using 2-3 L oxygen at home. BP is around 590G-895H systolic. No chest pain. No falls. Good appetite. No known weight loss or gain. Has not had colonoscopy and not interested due to quality of life. Has been having good regular bms since discharge. Wife and son present for exam and helped provide a portion of history in [place]. History was conducted in Lithuanian without the use of . Review of Systems   Constitutional:  Negative for chills, fever and unexpected weight change. Respiratory:  Negative for shortness of breath. Cardiovascular:  Negative for chest pain. Gastrointestinal:  Negative for anal bleeding, constipation, diarrhea, rectal pain and vomiting.        Objective     /70 (BP Location: Left arm, Patient Position: Sitting, Cuff Size: Standard)   Pulse 74   Temp (!) 97.2 °F (36.2 °C) (Tympanic)   Resp 17   Ht 5' 3" (1.6 m)   Wt 61.2 kg (135 lb)   SpO2 (!) 84%   BMI 23.91 kg/m²      Physical Exam  Vitals reviewed. Constitutional:       Appearance: Normal appearance. He is ill-appearing (chronically). Comments: Hearing loss, able to answer questions appropriately    HENT:      Head: Normocephalic and atraumatic. Cardiovascular:      Rate and Rhythm: Normal rate and regular rhythm. Pulmonary:      Effort: Pulmonary effort is normal.      Breath sounds: Normal breath sounds. Comments: 99% O2 sat on 3L O2 NC  Neurological:      Mental Status: He is alert and oriented to person, place, and time. Mental status is at baseline.        Medications have been reviewed by provider in current encounter    Daryl Greenwood PA-C

## 2023-12-05 ENCOUNTER — HOSPITAL ENCOUNTER (INPATIENT)
Facility: HOSPITAL | Age: 88
LOS: 3 days | Discharge: HOME | DRG: 208 | End: 2023-12-08
Attending: EMERGENCY MEDICINE | Admitting: INTERNAL MEDICINE
Payer: MEDICARE

## 2023-12-05 ENCOUNTER — APPOINTMENT (INPATIENT)
Dept: RADIOLOGY | Facility: HOSPITAL | Age: 88
DRG: 208 | End: 2023-12-05
Payer: MEDICARE

## 2023-12-05 ENCOUNTER — APPOINTMENT (EMERGENCY)
Dept: CT IMAGING | Facility: HOSPITAL | Age: 88
DRG: 208 | End: 2023-12-05
Payer: MEDICARE

## 2023-12-05 ENCOUNTER — APPOINTMENT (EMERGENCY)
Dept: RADIOLOGY | Facility: HOSPITAL | Age: 88
DRG: 208 | End: 2023-12-05
Payer: MEDICARE

## 2023-12-05 DIAGNOSIS — J96.22 ACUTE ON CHRONIC RESPIRATORY FAILURE WITH HYPOXIA AND HYPERCAPNIA (HCC): ICD-10-CM

## 2023-12-05 DIAGNOSIS — J44.9 CHRONIC OBSTRUCTIVE PULMONARY DISEASE, UNSPECIFIED COPD TYPE (HCC): ICD-10-CM

## 2023-12-05 DIAGNOSIS — J96.21 ACUTE ON CHRONIC RESPIRATORY FAILURE WITH HYPOXIA AND HYPERCAPNIA (HCC): ICD-10-CM

## 2023-12-05 DIAGNOSIS — J96.22 ACUTE ON CHRONIC RESPIRATORY FAILURE WITH HYPERCAPNIA (HCC): Primary | ICD-10-CM

## 2023-12-05 PROBLEM — T17.908A ASPIRATION INTO AIRWAY: Status: ACTIVE | Noted: 2023-12-05

## 2023-12-05 LAB
2HR DELTA HS TROPONIN: -4 NG/L
4HR DELTA HS TROPONIN: 6 NG/L
ABO GROUP BLD: NORMAL
ALBUMIN SERPL BCP-MCNC: 4.3 G/DL (ref 3.5–5)
ALP SERPL-CCNC: 85 U/L (ref 34–104)
ALT SERPL W P-5'-P-CCNC: 20 U/L (ref 7–52)
ANION GAP SERPL CALCULATED.3IONS-SCNC: 3 MMOL/L
APTT PPP: 25 SECONDS (ref 23–37)
ARTERIAL PATENCY WRIST A: YES
AST SERPL W P-5'-P-CCNC: 22 U/L (ref 13–39)
ATRIAL RATE: 69 BPM
ATRIAL RATE: 82 BPM
BASE EX.OXY STD BLDV CALC-SCNC: 80.9 % (ref 60–80)
BASE EXCESS BLDA CALC-SCNC: 13.1 MMOL/L
BASE EXCESS BLDA CALC-SCNC: 14.8 MMOL/L
BASE EXCESS BLDA CALC-SCNC: 14.8 MMOL/L
BASE EXCESS BLDA CALC-SCNC: 9.6 MMOL/L
BASE EXCESS BLDV CALC-SCNC: 11.8 MMOL/L
BASOPHILS # BLD MANUAL: 0 THOUSAND/UL (ref 0–0.1)
BASOPHILS NFR MAR MANUAL: 0 % (ref 0–1)
BILIRUB SERPL-MCNC: 0.61 MG/DL (ref 0.2–1)
BLD GP AB SCN SERPL QL: NEGATIVE
BNP SERPL-MCNC: 97 PG/ML (ref 0–100)
BUN SERPL-MCNC: 20 MG/DL (ref 5–25)
CALCIUM SERPL-MCNC: 9.9 MG/DL (ref 8.4–10.2)
CARDIAC TROPONIN I PNL SERPL HS: 10 NG/L
CARDIAC TROPONIN I PNL SERPL HS: 14 NG/L
CARDIAC TROPONIN I PNL SERPL HS: 20 NG/L
CHLORIDE SERPL-SCNC: 95 MMOL/L (ref 96–108)
CO2 SERPL-SCNC: 45 MMOL/L (ref 21–32)
CREAT SERPL-MCNC: 0.93 MG/DL (ref 0.6–1.3)
EOSINOPHIL # BLD MANUAL: 1.26 THOUSAND/UL (ref 0–0.4)
EOSINOPHIL NFR BLD MANUAL: 14 % (ref 0–6)
ERYTHROCYTE [DISTWIDTH] IN BLOOD BY AUTOMATED COUNT: 13.2 % (ref 11.6–15.1)
FLUAV RNA RESP QL NAA+PROBE: NEGATIVE
FLUBV RNA RESP QL NAA+PROBE: NEGATIVE
GFR SERPL CREATININE-BSD FRML MDRD: 74 ML/MIN/1.73SQ M
GLUCOSE SERPL-MCNC: 129 MG/DL (ref 65–140)
HCO3 BLDA-SCNC: 36.1 MMOL/L (ref 22–28)
HCO3 BLDA-SCNC: 46 MMOL/L (ref 22–28)
HCO3 BLDA-SCNC: 46.3 MMOL/L (ref 22–28)
HCO3 BLDA-SCNC: 49.3 MMOL/L (ref 22–28)
HCO3 BLDV-SCNC: 42.4 MMOL/L (ref 24–30)
HCT VFR BLD AUTO: 46.4 % (ref 36.5–49.3)
HGB BLD-MCNC: 13.8 G/DL (ref 12–17)
HOROWITZ INDEX BLDA+IHG-RTO: 50 MM[HG]
I-TIME: 1
INR PPP: 0.99 (ref 0.84–1.19)
IPAP: 20
LYMPHOCYTES # BLD AUTO: 3.87 THOUSAND/UL (ref 0.6–4.47)
LYMPHOCYTES # BLD AUTO: 43 % (ref 14–44)
MCH RBC QN AUTO: 31.6 PG (ref 26.8–34.3)
MCHC RBC AUTO-ENTMCNC: 29.7 G/DL (ref 31.4–37.4)
MCV RBC AUTO: 106 FL (ref 82–98)
MONOCYTES # BLD AUTO: 0.27 THOUSAND/UL (ref 0–1.22)
MONOCYTES NFR BLD: 3 % (ref 4–12)
NASAL CANNULA: 6
NEUTROPHILS # BLD MANUAL: 3.6 THOUSAND/UL (ref 1.85–7.62)
NEUTS SEG NFR BLD AUTO: 40 % (ref 43–75)
NON VENT- BIPAP: ABNORMAL
O2 CT BLDA-SCNC: 17.9 ML/DL (ref 16–23)
O2 CT BLDA-SCNC: 18.7 ML/DL (ref 16–23)
O2 CT BLDA-SCNC: 19 ML/DL (ref 16–23)
O2 CT BLDA-SCNC: 19.4 ML/DL (ref 16–23)
O2 CT BLDV-SCNC: 15.8 ML/DL
OXYHGB MFR BLDA: 95.2 % (ref 94–97)
OXYHGB MFR BLDA: 95.4 % (ref 94–97)
OXYHGB MFR BLDA: 97.3 % (ref 94–97)
OXYHGB MFR BLDA: 97.5 % (ref 94–97)
P AXIS: 70 DEGREES
P AXIS: 83 DEGREES
PCO2 BLDA: 100.3 MM HG (ref 36–44)
PCO2 BLDA: 115.1 MM HG (ref 36–44)
PCO2 BLDA: 132.1 MM HG (ref 36–44)
PCO2 BLDA: 57.2 MM HG (ref 36–44)
PCO2 BLDV: 90.3 MM HG (ref 42–50)
PEEP MAX SETTING VENT: 6 CM[H2O]
PEEP RESPIRATORY: 6 CM[H2O]
PH BLDA: 7.19 [PH] (ref 7.35–7.45)
PH BLDA: 7.22 [PH] (ref 7.35–7.45)
PH BLDA: 7.28 [PH] (ref 7.35–7.45)
PH BLDA: 7.42 [PH] (ref 7.35–7.45)
PH BLDV: 7.29 [PH] (ref 7.3–7.4)
PLATELET # BLD AUTO: 120 THOUSANDS/UL (ref 149–390)
PLATELET # BLD AUTO: 93 THOUSANDS/UL (ref 149–390)
PLATELET BLD QL SMEAR: ABNORMAL
PMV BLD AUTO: 9.1 FL (ref 8.9–12.7)
PMV BLD AUTO: 9.3 FL (ref 8.9–12.7)
PO2 BLDA: 100.9 MM HG (ref 75–129)
PO2 BLDA: 109.1 MM HG (ref 75–129)
PO2 BLDA: 145.1 MM HG (ref 75–129)
PO2 BLDA: 98.2 MM HG (ref 75–129)
PO2 BLDV: 48.9 MM HG (ref 35–45)
POTASSIUM SERPL-SCNC: 3.6 MMOL/L (ref 3.5–5.3)
PR INTERVAL: 154 MS
PR INTERVAL: 166 MS
PROCALCITONIN SERPL-MCNC: 0.08 NG/ML
PROT SERPL-MCNC: 8 G/DL (ref 6.4–8.4)
PROTHROMBIN TIME: 13.3 SECONDS (ref 11.6–14.5)
QRS AXIS: 86 DEGREES
QRS AXIS: 87 DEGREES
QRSD INTERVAL: 92 MS
QRSD INTERVAL: 92 MS
QT INTERVAL: 386 MS
QT INTERVAL: 440 MS
QTC INTERVAL: 450 MS
QTC INTERVAL: 471 MS
RBC # BLD AUTO: 4.37 MILLION/UL (ref 3.88–5.62)
RBC MORPH BLD: NORMAL
RH BLD: POSITIVE
RSV RNA RESP QL NAA+PROBE: NEGATIVE
SARS-COV-2 RNA RESP QL NAA+PROBE: NEGATIVE
SODIUM SERPL-SCNC: 143 MMOL/L (ref 135–147)
SPECIMEN EXPIRATION DATE: NORMAL
SPECIMEN SOURCE: ABNORMAL
T WAVE AXIS: 66 DEGREES
T WAVE AXIS: 78 DEGREES
VENT AC: 20
VENT BIPAP FIO2: 50 %
VENT- AC: AC
VENTRICULAR RATE: 69 BPM
VENTRICULAR RATE: 82 BPM
VT SETTING VENT: 450 ML
WBC # BLD AUTO: 9 THOUSAND/UL (ref 4.31–10.16)

## 2023-12-05 PROCEDURE — 84484 ASSAY OF TROPONIN QUANT: CPT | Performed by: EMERGENCY MEDICINE

## 2023-12-05 PROCEDURE — 84145 PROCALCITONIN (PCT): CPT | Performed by: NURSE PRACTITIONER

## 2023-12-05 PROCEDURE — 71260 CT THORAX DX C+: CPT

## 2023-12-05 PROCEDURE — 71045 X-RAY EXAM CHEST 1 VIEW: CPT

## 2023-12-05 PROCEDURE — 82805 BLOOD GASES W/O2 SATURATION: CPT | Performed by: EMERGENCY MEDICINE

## 2023-12-05 PROCEDURE — 96375 TX/PRO/DX INJ NEW DRUG ADDON: CPT

## 2023-12-05 PROCEDURE — 5A1935Z RESPIRATORY VENTILATION, LESS THAN 24 CONSECUTIVE HOURS: ICD-10-PCS | Performed by: INTERNAL MEDICINE

## 2023-12-05 PROCEDURE — 99291 CRITICAL CARE FIRST HOUR: CPT | Performed by: EMERGENCY MEDICINE

## 2023-12-05 PROCEDURE — 85007 BL SMEAR W/DIFF WBC COUNT: CPT | Performed by: EMERGENCY MEDICINE

## 2023-12-05 PROCEDURE — 83880 ASSAY OF NATRIURETIC PEPTIDE: CPT | Performed by: EMERGENCY MEDICINE

## 2023-12-05 PROCEDURE — 31500 INSERT EMERGENCY AIRWAY: CPT | Performed by: NURSE PRACTITIONER

## 2023-12-05 PROCEDURE — 94664 DEMO&/EVAL PT USE INHALER: CPT

## 2023-12-05 PROCEDURE — NC001 PR NO CHARGE: Performed by: NURSE PRACTITIONER

## 2023-12-05 PROCEDURE — 0BH17EZ INSERTION OF ENDOTRACHEAL AIRWAY INTO TRACHEA, VIA NATURAL OR ARTIFICIAL OPENING: ICD-10-PCS | Performed by: INTERNAL MEDICINE

## 2023-12-05 PROCEDURE — 36600 WITHDRAWAL OF ARTERIAL BLOOD: CPT

## 2023-12-05 PROCEDURE — 82805 BLOOD GASES W/O2 SATURATION: CPT | Performed by: NURSE PRACTITIONER

## 2023-12-05 PROCEDURE — 85730 THROMBOPLASTIN TIME PARTIAL: CPT | Performed by: EMERGENCY MEDICINE

## 2023-12-05 PROCEDURE — 84484 ASSAY OF TROPONIN QUANT: CPT | Performed by: NURSE PRACTITIONER

## 2023-12-05 PROCEDURE — 85610 PROTHROMBIN TIME: CPT | Performed by: EMERGENCY MEDICINE

## 2023-12-05 PROCEDURE — 94002 VENT MGMT INPAT INIT DAY: CPT

## 2023-12-05 PROCEDURE — 99223 1ST HOSP IP/OBS HIGH 75: CPT | Performed by: NURSE PRACTITIONER

## 2023-12-05 PROCEDURE — 94660 CPAP INITIATION&MGMT: CPT

## 2023-12-05 PROCEDURE — 70450 CT HEAD/BRAIN W/O DYE: CPT

## 2023-12-05 PROCEDURE — 86901 BLOOD TYPING SEROLOGIC RH(D): CPT | Performed by: EMERGENCY MEDICINE

## 2023-12-05 PROCEDURE — 94640 AIRWAY INHALATION TREATMENT: CPT

## 2023-12-05 PROCEDURE — 0BJ08ZZ INSPECTION OF TRACHEOBRONCHIAL TREE, VIA NATURAL OR ARTIFICIAL OPENING ENDOSCOPIC: ICD-10-PCS | Performed by: INTERNAL MEDICINE

## 2023-12-05 PROCEDURE — 86850 RBC ANTIBODY SCREEN: CPT | Performed by: EMERGENCY MEDICINE

## 2023-12-05 PROCEDURE — 86900 BLOOD TYPING SEROLOGIC ABO: CPT | Performed by: EMERGENCY MEDICINE

## 2023-12-05 PROCEDURE — 74177 CT ABD & PELVIS W/CONTRAST: CPT

## 2023-12-05 PROCEDURE — 94644 CONT INHLJ TX 1ST HOUR: CPT

## 2023-12-05 PROCEDURE — 85027 COMPLETE CBC AUTOMATED: CPT | Performed by: EMERGENCY MEDICINE

## 2023-12-05 PROCEDURE — 80053 COMPREHEN METABOLIC PANEL: CPT | Performed by: EMERGENCY MEDICINE

## 2023-12-05 PROCEDURE — 36415 COLL VENOUS BLD VENIPUNCTURE: CPT | Performed by: EMERGENCY MEDICINE

## 2023-12-05 PROCEDURE — 93005 ELECTROCARDIOGRAM TRACING: CPT

## 2023-12-05 PROCEDURE — 85049 AUTOMATED PLATELET COUNT: CPT | Performed by: NURSE PRACTITIONER

## 2023-12-05 PROCEDURE — 0241U HB NFCT DS VIR RESP RNA 4 TRGT: CPT | Performed by: EMERGENCY MEDICINE

## 2023-12-05 PROCEDURE — 99285 EMERGENCY DEPT VISIT HI MDM: CPT

## 2023-12-05 PROCEDURE — 96374 THER/PROPH/DIAG INJ IV PUSH: CPT

## 2023-12-05 PROCEDURE — 94760 N-INVAS EAR/PLS OXIMETRY 1: CPT

## 2023-12-05 RX ORDER — ONDANSETRON 2 MG/ML
1 INJECTION INTRAMUSCULAR; INTRAVENOUS ONCE
Status: COMPLETED | OUTPATIENT
Start: 2023-12-05 | End: 2023-12-05

## 2023-12-05 RX ORDER — SODIUM CHLORIDE, SODIUM LACTATE, POTASSIUM CHLORIDE, CALCIUM CHLORIDE 600; 310; 30; 20 MG/100ML; MG/100ML; MG/100ML; MG/100ML
75 INJECTION, SOLUTION INTRAVENOUS CONTINUOUS
Status: DISCONTINUED | OUTPATIENT
Start: 2023-12-05 | End: 2023-12-06

## 2023-12-05 RX ORDER — FENTANYL CITRATE-0.9 % NACL/PF 10 MCG/ML
25 PLASTIC BAG, INJECTION (ML) INTRAVENOUS CONTINUOUS
Status: DISCONTINUED | OUTPATIENT
Start: 2023-12-05 | End: 2023-12-06

## 2023-12-05 RX ORDER — DOCUSATE SODIUM 100 MG/1
100 CAPSULE, LIQUID FILLED ORAL 2 TIMES DAILY
Status: DISCONTINUED | OUTPATIENT
Start: 2023-12-05 | End: 2023-12-06

## 2023-12-05 RX ORDER — ALBUTEROL SULFATE 2.5 MG/3ML
2.5 SOLUTION RESPIRATORY (INHALATION) EVERY 6 HOURS PRN
Status: DISCONTINUED | OUTPATIENT
Start: 2023-12-05 | End: 2023-12-06

## 2023-12-05 RX ORDER — BUDESONIDE 0.5 MG/2ML
0.5 INHALANT ORAL
Status: DISCONTINUED | OUTPATIENT
Start: 2023-12-05 | End: 2023-12-08 | Stop reason: SDUPTHER

## 2023-12-05 RX ORDER — SUCCINYLCHOLINE/SOD CL,ISO/PF 100 MG/5ML
60 SYRINGE (ML) INTRAVENOUS ONCE
Status: COMPLETED | OUTPATIENT
Start: 2023-12-05 | End: 2023-12-05

## 2023-12-05 RX ORDER — METHYLPREDNISOLONE SODIUM SUCCINATE 40 MG/ML
40 INJECTION, POWDER, LYOPHILIZED, FOR SOLUTION INTRAMUSCULAR; INTRAVENOUS EVERY 8 HOURS SCHEDULED
Status: DISCONTINUED | OUTPATIENT
Start: 2023-12-05 | End: 2023-12-06

## 2023-12-05 RX ORDER — CHLORHEXIDINE GLUCONATE ORAL RINSE 1.2 MG/ML
15 SOLUTION DENTAL EVERY 12 HOURS SCHEDULED
Status: DISCONTINUED | OUTPATIENT
Start: 2023-12-05 | End: 2023-12-05 | Stop reason: SDUPTHER

## 2023-12-05 RX ORDER — PROPOFOL 10 MG/ML
INJECTION, EMULSION INTRAVENOUS
Status: COMPLETED
Start: 2023-12-05 | End: 2023-12-05

## 2023-12-05 RX ORDER — ONDANSETRON 2 MG/ML
INJECTION INTRAMUSCULAR; INTRAVENOUS
Status: COMPLETED
Start: 2023-12-05 | End: 2023-12-05

## 2023-12-05 RX ORDER — CHLORHEXIDINE GLUCONATE ORAL RINSE 1.2 MG/ML
15 SOLUTION DENTAL EVERY 12 HOURS SCHEDULED
Status: DISCONTINUED | OUTPATIENT
Start: 2023-12-05 | End: 2023-12-06

## 2023-12-05 RX ORDER — HEPARIN SODIUM 5000 [USP'U]/ML
5000 INJECTION, SOLUTION INTRAVENOUS; SUBCUTANEOUS EVERY 8 HOURS SCHEDULED
Status: DISCONTINUED | OUTPATIENT
Start: 2023-12-05 | End: 2023-12-08 | Stop reason: HOSPADM

## 2023-12-05 RX ORDER — ONDANSETRON 2 MG/ML
4 INJECTION INTRAMUSCULAR; INTRAVENOUS ONCE
Status: COMPLETED | OUTPATIENT
Start: 2023-12-05 | End: 2023-12-05

## 2023-12-05 RX ORDER — LABETALOL HYDROCHLORIDE 5 MG/ML
10 INJECTION, SOLUTION INTRAVENOUS ONCE
Status: COMPLETED | OUTPATIENT
Start: 2023-12-05 | End: 2023-12-05

## 2023-12-05 RX ORDER — FLUTICASONE FUROATE AND VILANTEROL 100; 25 UG/1; UG/1
1 POWDER RESPIRATORY (INHALATION) DAILY
Status: DISCONTINUED | OUTPATIENT
Start: 2023-12-06 | End: 2023-12-06

## 2023-12-05 RX ORDER — FENTANYL CITRATE 50 UG/ML
50 INJECTION, SOLUTION INTRAMUSCULAR; INTRAVENOUS EVERY 2 HOUR PRN
Status: DISCONTINUED | OUTPATIENT
Start: 2023-12-05 | End: 2023-12-06

## 2023-12-05 RX ORDER — ETOMIDATE 2 MG/ML
20 INJECTION INTRAVENOUS ONCE
Status: COMPLETED | OUTPATIENT
Start: 2023-12-05 | End: 2023-12-05

## 2023-12-05 RX ORDER — SODIUM CHLORIDE FOR INHALATION 0.9 %
12 VIAL, NEBULIZER (ML) INHALATION ONCE
Status: COMPLETED | OUTPATIENT
Start: 2023-12-05 | End: 2023-12-05

## 2023-12-05 RX ORDER — PROPOFOL 10 MG/ML
5-50 INJECTION, EMULSION INTRAVENOUS
Status: DISCONTINUED | OUTPATIENT
Start: 2023-12-05 | End: 2023-12-06

## 2023-12-05 RX ORDER — LOSARTAN POTASSIUM 50 MG/1
50 TABLET ORAL 2 TIMES DAILY
Status: DISCONTINUED | OUTPATIENT
Start: 2023-12-05 | End: 2023-12-06

## 2023-12-05 RX ORDER — BRIMONIDINE TARTRATE 2 MG/ML
1 SOLUTION/ DROPS OPHTHALMIC 3 TIMES DAILY
Status: DISCONTINUED | OUTPATIENT
Start: 2023-12-05 | End: 2023-12-08 | Stop reason: HOSPADM

## 2023-12-05 RX ADMIN — Medication 12 ML: at 14:24

## 2023-12-05 RX ADMIN — ONDANSETRON 4 MG: 2 INJECTION INTRAMUSCULAR; INTRAVENOUS at 23:35

## 2023-12-05 RX ADMIN — IPRATROPIUM BROMIDE 1 MG: 0.5 SOLUTION RESPIRATORY (INHALATION) at 14:23

## 2023-12-05 RX ADMIN — ONDANSETRON 4 MG: 2 INJECTION INTRAMUSCULAR; INTRAVENOUS at 19:21

## 2023-12-05 RX ADMIN — ETOMIDATE 20 MG: 20 INJECTION, SOLUTION INTRAVENOUS at 22:00

## 2023-12-05 RX ADMIN — Medication 25 MCG/HR: at 22:18

## 2023-12-05 RX ADMIN — ONDANSETRON 4 MG: 2 INJECTION INTRAMUSCULAR; INTRAVENOUS at 14:53

## 2023-12-05 RX ADMIN — ALBUTEROL SULFATE 10 MG: 2.5 SOLUTION RESPIRATORY (INHALATION) at 14:24

## 2023-12-05 RX ADMIN — AZITHROMYCIN 500 MG: 500 INJECTION, POWDER, LYOPHILIZED, FOR SOLUTION INTRAVENOUS at 21:00

## 2023-12-05 RX ADMIN — HEPARIN SODIUM 5000 UNITS: 5000 INJECTION INTRAVENOUS; SUBCUTANEOUS at 21:09

## 2023-12-05 RX ADMIN — SODIUM CHLORIDE, SODIUM LACTATE, POTASSIUM CHLORIDE, AND CALCIUM CHLORIDE 75 ML/HR: .6; .31; .03; .02 INJECTION, SOLUTION INTRAVENOUS at 18:21

## 2023-12-05 RX ADMIN — PROPOFOL 10 MCG/KG/MIN: 10 INJECTION, EMULSION INTRAVENOUS at 22:10

## 2023-12-05 RX ADMIN — BUDESONIDE 0.5 MG: 0.5 INHALANT ORAL at 22:12

## 2023-12-05 RX ADMIN — IOHEXOL 85 ML: 350 INJECTION, SOLUTION INTRAVENOUS at 15:17

## 2023-12-05 RX ADMIN — Medication 60 MG: at 22:00

## 2023-12-05 RX ADMIN — LABETALOL HYDROCHLORIDE 10 MG: 5 INJECTION, SOLUTION INTRAVENOUS at 14:55

## 2023-12-05 RX ADMIN — METHYLPREDNISOLONE SODIUM SUCCINATE 40 MG: 40 INJECTION, POWDER, FOR SOLUTION INTRAMUSCULAR; INTRAVENOUS at 21:08

## 2023-12-05 NOTE — ASSESSMENT & PLAN NOTE
The patient arrived with a c/o syncope and vomiting. He was found to be hypoxic and lethargic. His initial ABG revealed an PH of 7.19 with a PCO2 of 132.  He was placed on BIPAP  He is still a bit lethargic but arousable  We will repeat his ABG now- if improved, we can maintain the BIPAP but if not, he will need to be intubated  We will continue his bronchodilators  We will continue him on solumedrol- he was on prednsione as an outpatient

## 2023-12-05 NOTE — ED PROVIDER NOTES
Pt Name: Terry Patel  MRN: [id number]  [address] 1935  Age/Sex: 82 y.o. male  Date of evaluation: 12/5/2023  PCP: Gina Grover MD    [address]    Chief Complaint   Patient presents with    Loss of Consciousness     Pt brought by ems had syncopal episode at home. Vomiting and lethargic on arrival.      HPI    Edra Ace presents to the Emergency Department after vomiting and syncope at home. She was found to be hypoxic by EMS and was placed on Oxygen. He is on 2L at baseline for his chronic COPD. He was recently admitted for GI bleeding but according to his son had been doing well at home. HPI      Past Medical and Surgical History    Past Medical History:   Diagnosis Date    Acute metabolic encephalopathy 95/25/1345    Acute on chronic respiratory failure ([address]) 12/4/2022    Age-related cataract of right eye 4/4/2023    patient is medically cleared and presents with low risk of complication with this upcoming R cataract surgery.     Anemia     Aneurysm, aorta, thoracic (HCC)     Appendicolith     Ascending aortic aneurysm (HCC)     3.7    Asthma     BPH (benign prostatic hyperplasia)     CAD (coronary artery disease)     noted on CT scan    CHF (congestive heart failure) (HCC)     COPD (chronic obstructive pulmonary disease) ([address])     Descending thoracic aortic aneurysm ([address])     Diabetes mellitus ([address])     Diverticulosis     Former tobacco use     GERD (gastroesophageal reflux disease)     History of DVT (deep vein thrombosis)     Left leg    History of transfusion     Hypertension     Inguinal hernia     right    Nephrolithiasis     Oxygen dependent     2LNC    Oxygen dependent     Pneumonia     Pre-diabetes     Prostate calculus     PVD (peripheral vascular disease) ([address])     Recurrent right inguinal hernia w incarcertion 4/3/1945    Small bowel obstruction ([address]) 12/28/2022    Thoracic aortic aneurysm without rupture ([address]) 11/19/2018    Added automatically from request for surgery [id number]    Thrombocytopenia ([address] 2018    Ulcer     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)     Varicose vein of leg     b/l       Past Surgical History:   Procedure Laterality Date    CARDIAC SURGERY      ESOPHAGOGASTRODUODENOSCOPY      HERNIA REPAIR Right 2019    Procedure: REPAIR HERNIA INGUINAL WITH MESH;  Surgeon: Irena Valenzuela MD;  Location: [address] [place] [address] MAIN OR;  Service: General    HERNIA REPAIR Right 2023    Procedure: REPAIR RECURRENT INCARERATED HERNIA INGUINAL OPEN, RIGHT ORCHIECTOMY;  Surgeon: Shahab Wolf MD;  Location: [facility] Main OR;  Service: General    INGUINAL HERNIA REPAIR Bilateral     IR TEVAR  2018    SC EVASC RPR DTA COVERAGE ART ORIGIN 1ST ENDOPROSTH N/A 2018    Procedure: TEVAR - endovascular thoracic aortic aneurysm repair;  Surgeon: Billie Cummings MD;  Location:  MAIN OR;  Service: Vascular    THORACIC AORTIC ANEURYSM REPAIR  2018    VARICOSE VEIN SURGERY Bilateral     vein stripping       Family History   Problem Relation Age of Onset    Tuberculosis Mother     No Known Problems Father     Cancer Sister     Diabetes Family     Hypertension Family        Social History     Tobacco Use    Smoking status: Former     Packs/day: 1.00     Years: 35.00     Total pack years: 35.00     Types: Cigarettes     Start date:      Quit date:      Years since quittin.9    Smokeless tobacco: Never   Vaping Use    Vaping Use: Never used   Substance Use Topics    Alcohol use: Never    Drug use: No         .    Allergies    Allergies   Allergen Reactions    Penicillins Hives, Itching and Rash    Lisinopril Rash     Side pains and rash     Zyprexa [Olanzapine] Tongue Swelling       Home Medications    Prior to Admission medications    Medication Sig Start Date End Date Taking?  Authorizing Provider   albuterol (2.5 mg/3 mL) 0.083 % nebulizer solution TAKE 3 ML (2.5 MG TOTAL) BY NEBULIZATION EVERY 6 (SIX) HOURS AS NEEDED FOR WHEEZING OR SHORTNESS OF BREATH 11/13/23   Marci Iraheta MD   albuterol (PROVENTIL HFA,VENTOLIN HFA) 90 mcg/act inhaler INHALE 2 PUFFS EVERY 6 (SIX) HOURS AS NEEDED FOR WHEEZING 8/15/23   Nicky Jeff MD   brimonidine tartrate 0.2 % ophthalmic solution  7/6/23   Historical Provider, MD   budesonide (Pulmicort) 0.5 mg/2 mL nebulizer solution Take 2 mL (0.5 mg total) by nebulization 2 (two) times a day Rinse mouth after use. 8/15/23 10/10/23  Marci Iraheta MD   docusate sodium (COLACE) 100 mg capsule Take 1 capsule (100 mg total) by mouth 2 (two) times a day 11/16/23   Cassandra Patton PA-C   formoterol (PERFOROMIST) 20 MCG/2ML nebulizer solution Take 2 mL (20 mcg total) by nebulization 2 (two) times a day 8/15/23   Marci Iraheta MD   hydroxypropyl methylcellulose (GONAK) 2.5 % ophthalmic solution Apply 1 drop to eye Three times a day 6/12/23   Historical Provider, MD   losartan (COZAAR) 25 mg tablet Take 2 tablets (50 mg total) by mouth 2 (two) times a day 11/21/23   Trinh Rader PA-C   montelukast (SINGULAIR) 10 mg tablet Take 1 tablet (10 mg total) by mouth daily at bedtime Diana 1 tableta en la noche para las alergias 10/25/23   Trinh Rader PA-C   pantoprazole (PROTONIX) 40 mg tablet TAKE 1 TABLET (40 MG TOTAL) BY MOUTH DAILY 10/4/23   Nicky Jeff MD   polyethylene glycol [facility]) 17 GM/SCOOP powder Take 17 g by mouth daily 11/21/23   Trinh Rader PA-C   polyethylene glycol (Golytely) 4000 mL solution Take 4,000 mL by mouth once for 1 dose Take 4000 mL by mouth once for 1 dose.  Use as directed 11/16/23 11/16/23  Jacqueline De La Fuente DO   predniSONE 5 mg tablet Take 1 tablet (5 mg total) by mouth daily 11/21/23   Trinh Rader PA-C   Trelegy Ellipta 200-62.5-25 MCG/ACT AEPB inhaler INHALE 1 PUFF DAILY RINSE MOUTH AFTER USE. 8/15/23   Historical Provider, MD           Review of Systems    Review of Systems   Unable to perform ROS: Unstable vital signs       Physical Exam      ED Triage Vitals   Temperature Pulse Respirations Blood Pressure SpO2   12/05/23 1401 12/05/23 1357 12/05/23 1357 12/05/23 1357 12/05/23 1357   98.3 °F (36.8 °C) 83 14 (!) 231/109 (!) 59 %      Temp Source Heart Rate Source Patient Position - Orthostatic VS BP Location FiO2 (%)   12/05/23 1401 12/05/23 1357 12/05/23 1357 12/05/23 1357 12/05/23 2015   Oral Monitor Sitting Right arm 50      Pain Score       12/05/23 2218       Med Not Given for Pain - for MAR use only               Physical Exam  Constitutional:       General: He is in acute distress. Appearance: He is ill-appearing. Interventions: Face mask in place. HENT:      Head: Normocephalic. Pulmonary:      Effort: Prolonged expiration present. Breath sounds: Decreased air movement present. Neurological:      Mental Status: He is lethargic. Comments: Patient does open his eyes and respond                 Assessment and Plan    Edgardo Perez is a 82 y.o. male who presents with syncope, vomiting, altered mental status. Plan will be to perform diagnostic testing and treat symptomatically. MDM  Number of Diagnoses or Management Options  Diagnosis management comments: Patient seen and evaluated on arrival to ED. He was following commands. When his son arrived he was able to give additional history and the patient         Diagnostic Results    EKG:  EKG INTERPRETATION  EKG Interpretation    Rate: 82 BPM  Rhythm: sinus  Intervals: Incomplete RBBB  T waves: nonspecific  ST segments: nonspecific    Impression: abnormal EKG. EKG for comparison: reviewed  EKG interpreted by me. Interpretation by Melia Acosta DO  EKG reviewed and interpreted independently.     Labs:    Results for orders placed or performed during the hospital encounter of 12/05/23   FLU/RSV/COVID - if FLU/RSV clinically relevant    Specimen: Nose; Nares   Result Value Ref Range    SARS-CoV-2 Negative Negative    INFLUENZA A PCR Negative Negative    INFLUENZA B PCR Negative Negative    RSV PCR Negative Negative   CBC and differential   Result Value Ref Range    WBC 9.00 4.31 - 10.16 Thousand/uL    RBC 4.37 3.88 - 5.62 Million/uL    Hemoglobin 13.8 12.0 - 17.0 g/dL    Hematocrit 46.4 36.5 - 49.3 %     (H) 82 - 98 fL    MCH 31.6 26.8 - 34.3 pg    MCHC 29.7 (L) 31.4 - 37.4 g/dL    RDW 13.2 11.6 - 15.1 %    MPV 9.3 8.9 - 12.7 fL    Platelets 711 (L) 254 - 390 Thousands/uL   Comprehensive metabolic panel   Result Value Ref Range    Sodium 143 135 - 147 mmol/L    Potassium 3.6 3.5 - 5.3 mmol/L    Chloride 95 (L) 96 - 108 mmol/L    CO2 45 (H) 21 - 32 mmol/L    ANION GAP 3 mmol/L    BUN 20 5 - 25 mg/dL    Creatinine 0.93 0.60 - 1.30 mg/dL    Glucose 129 65 - 140 mg/dL    Calcium 9.9 8.4 - 10.2 mg/dL    AST 22 13 - 39 U/L    ALT 20 7 - 52 U/L    Alkaline Phosphatase 85 34 - 104 U/L    Total Protein 8.0 6.4 - 8.4 g/dL    Albumin 4.3 3.5 - 5.0 g/dL    Total Bilirubin 0.61 0.20 - 1.00 mg/dL    eGFR 74 ml/min/1.73sq m   HS Troponin 0hr (reflex protocol)   Result Value Ref Range    hs TnI 0hr 14 "Refer to ACS Flowchart"- see link ng/L   B-Type Natriuretic Peptide(BNP)   Result Value Ref Range    BNP 97 0 - 100 pg/mL   Blood gas, arterial   Result Value Ref Range    pH, Arterial 7.190 (LL) 7.350 - 7.450    pCO2, Arterial 132.1 (HH) 36.0 - 44.0 mm Hg    pO2, Arterial 109.1 75.0 - 129.0 mm Hg    HCO3, Arterial 49.3 (H) 22.0 - 28.0 mmol/L    Base Excess, Arterial 14.8 mmol/L    O2 Content, Arterial 19.4 16.0 - 23.0 mL/dL    O2 HGB,Arterial  95.2 94.0 - 97.0 %    SOURCE Radial, Right     AVINASH TEST Yes     Nasal Cannula 6    Protime-INR   Result Value Ref Range    Protime 13.3 11.6 - 14.5 seconds    INR 0.99 0.84 - 1.19   APTT   Result Value Ref Range    PTT 25 23 - 37 seconds   HS Troponin I 2hr   Result Value Ref Range    hs TnI 2hr 10 "Refer to ACS Flowchart"- see link ng/L    Delta 2hr hsTnI -4 <20 ng/L   HS Troponin I 4hr   Result Value Ref Range    hs TnI 4hr 20 "Refer to ACS Flowchart"- see link ng/L    Delta 4hr hsTnI 6 <20 ng/L   Blood gas, arterial   Result Value Ref Range    pH, Arterial 7.282 (L) 7.350 - 7.450    pCO2, Arterial 100.3 (HH) 36.0 - 44.0 mm Hg    pO2, Arterial 145.1 (H) 75.0 - 129.0 mm Hg    HCO3, Arterial 46.3 (H) 22.0 - 28.0 mmol/L    Base Excess, Arterial 14.8 mmol/L    O2 Content, Arterial 19.0 16.0 - 23.0 mL/dL    O2 HGB,Arterial  97.3 (H) 94.0 - 97.0 %    SOURCE Radial, Right     AVINASH TEST Yes     Non Vent type BIPAP BIPAP     IPAP 20     EPAP 6     BIPAP fio2 50 %   Procalcitonin   Result Value Ref Range    Procalcitonin 0.08 <=0.25 ng/ml   Platelet count   Result Value Ref Range    Platelets 93 (L) 052 - 390 Thousands/uL    MPV 9.1 8.9 - 12.7 fL   Blood gas, venous   Result Value Ref Range    pH, Nabeel 7.290 (L) 7.300 - 7.400    pCO2, Nabeel 90.3 (H) 42.0 - 50.0 mm Hg    pO2, Nabeel 48.9 (H) 35.0 - 45.0 mm Hg    HCO3, Nabeel 42.4 (H) 24 - 30 mmol/L    Base Excess, Nabeel 11.8 mmol/L    O2 Content, Nabeel 15.8 ml/dL    O2 HGB, VENOUS 80.9 (H) 60.0 - 80.0 %   Blood gas, arterial   Result Value Ref Range    pH, Arterial 7.220 (LL) 7.350 - 7.450    pCO2, Arterial 115.1 (HH) 36.0 - 44.0 mm Hg    pO2, Arterial 100.9 75.0 - 129.0 mm Hg    HCO3, Arterial 46.0 (H) 22.0 - 28.0 mmol/L    Base Excess, Arterial 13.1 mmol/L    O2 Content, Arterial 18.7 16.0 - 23.0 mL/dL    O2 HGB,Arterial  95.4 94.0 - 97.0 %    SOURCE Radial, Left    Blood gas, arterial   Result Value Ref Range    pH, Arterial 7.418 7.350 - 7.450    pCO2, Arterial 57.2 (HH) 36.0 - 44.0 mm Hg    pO2, Arterial 98.2 75.0 - 129.0 mm Hg    HCO3, Arterial 36.1 (H) 22.0 - 28.0 mmol/L    Base Excess, Arterial 9.6 mmol/L    O2 Content, Arterial 17.9 16.0 - 23.0 mL/dL    O2 HGB,Arterial  97.5 (H) 94.0 - 97.0 %    SOURCE Radial, Left     AVINASH TEST Yes     Vent Type- AC AC     AC Rate 20     Tidal Volume 450 ml    I-TIME 1     Inspired Air (FIO2) 50     PEEP 6    CBC and differential   Result Value Ref Range    WBC 4.35 4.31 - 10.16 Thousand/uL    RBC 4.06 3.88 - 5.62 Million/uL Hemoglobin 12.8 12.0 - 17.0 g/dL    Hematocrit 42.6 36.5 - 49.3 %     (H) 82 - 98 fL    MCH 31.5 26.8 - 34.3 pg    MCHC 30.0 (L) 31.4 - 37.4 g/dL    RDW 13.1 11.6 - 15.1 %    MPV 10.1 8.9 - 12.7 fL    Platelets 837 (L) 649 - 390 Thousands/uL    nRBC 0 /100 WBCs    Neutrophils Relative 78 (H) 43 - 75 %    Immat GRANS % 1 0 - 2 %    Lymphocytes Relative 16 14 - 44 %    Monocytes Relative 4 4 - 12 %    Eosinophils Relative 0 0 - 6 %    Basophils Relative 1 0 - 1 %    Neutrophils Absolute 3.45 1.85 - 7.62 Thousands/µL    Immature Grans Absolute 0.02 0.00 - 0.20 Thousand/uL    Lymphocytes Absolute 0.69 0.60 - 4.47 Thousands/µL    Monocytes Absolute 0.17 0.17 - 1.22 Thousand/µL    Eosinophils Absolute 0.00 0.00 - 0.61 Thousand/µL    Basophils Absolute 0.02 0.00 - 0.10 Thousands/µL   Basic metabolic panel   Result Value Ref Range    Sodium 141 135 - 147 mmol/L    Potassium 3.4 (L) 3.5 - 5.3 mmol/L    Chloride 96 96 - 108 mmol/L    CO2 35 (H) 21 - 32 mmol/L    ANION GAP 10 mmol/L    BUN 23 5 - 25 mg/dL    Creatinine 1.06 0.60 - 1.30 mg/dL    Glucose 161 (H) 65 - 140 mg/dL    Calcium 9.4 8.4 - 10.2 mg/dL    eGFR 63 ml/min/1.73sq m   Magnesium   Result Value Ref Range    Magnesium 2.0 1.9 - 2.7 mg/dL   Procalcitonin   Result Value Ref Range    Procalcitonin 0.19 <=0.25 ng/ml   Blood gas, arterial   Result Value Ref Range    pH, Arterial 7.537 (H) 7.350 - 7.450    pCO2, Arterial 43.3 36.0 - 44.0 mm Hg    pO2, Arterial 78.1 75.0 - 129.0 mm Hg    HCO3, Arterial 35.9 (H) 22.0 - 28.0 mmol/L    Base Excess, Arterial 12.1 mmol/L    O2 Content, Arterial 17.7 16.0 - 23.0 mL/dL    O2 HGB,Arterial  95.8 94.0 - 97.0 %    SOURCE Radial, Left     AVINASH TEST Yes     Vent Type- AC AC     AC Rate 20     Tidal Volume 450 ml    Inspired Air (FIO2) 50     PEEP 6    ECG 12 lead   Result Value Ref Range    Ventricular Rate 82 BPM    Atrial Rate 82 BPM    NH Interval 154 ms    QRSD Interval 92 ms    QT Interval 386 ms    QTC Interval 450 ms    P Axis 70 degrees    QRS Axis 86 degrees    T Wave Axis 66 degrees   ECG 12 lead   Result Value Ref Range    Ventricular Rate 69 BPM    Atrial Rate 69 BPM    IN Interval 166 ms    QRSD Interval 92 ms    QT Interval 440 ms    QTC Interval 471 ms    P Axis 83 degrees    QRS Axis 87 degrees    T Wave Axis 78 degrees   Type and screen   Result Value Ref Range    ABO Grouping O     Rh Factor Positive     Antibody Screen Negative     Specimen Expiration Date 20231208    Fingerstick Glucose (POCT)   Result Value Ref Range    POC Glucose 150 (H) 65 - 140 mg/dl   Manual Differential(PHLEBS Do Not Order)   Result Value Ref Range    Segmented % 40 (L) 43 - 75 %    Lymphocytes % 43 14 - 44 %    Monocytes % 3 (L) 4 - 12 %    Eosinophils, % 14 (H) 0 - 6 %    Basophils % 0 0 - 1 %    Absolute Neutrophils 3.60 1.85 - 7.62 Thousand/uL    Lymphocytes Absolute 3.87 0.60 - 4.47 Thousand/uL    Monocytes Absolute 0.27 0.00 - 1.22 Thousand/uL    Eosinophils Absolute 1.26 (H) 0.00 - 0.40 Thousand/uL    Basophils Absolute 0.00 0.00 - 0.10 Thousand/uL    Total Counted      RBC Morphology Normal     Platelet Estimate Borderline (A) Adequate       All labs reviewed and utilized in the medical decision making process    Radiology:    XR chest portable ICU   Final Result      Endotracheal tube above the nano. Possible right lung base infiltrate                  Workstation performed: [id number]         CT head wo contrast   Final Result      No acute intracranial abnormality. Stable chronic microangiopathic changes within the brain. 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 Workstation performed: [id number]         CT chest abdomen pelvis w contrast   Final Result         1. Secretions with near occlusion of the bronchus intermedius, peribronchial infiltrate in the right middle lobe and right lower lobe. Findings could reflect aspiration.    2. Aneurysmal dilatation of the thoracoabdominal aorta with stent placement. The size of the aneurysm is minimally increased at the level of the proximal aortic arch and the aortic hiatus measuring 4.3 cm.   3. Distended stomach containing ingested material. Extensive colonic diverticulosis. 4. Multiple incidental findings described in the body of the report. The study was marked in [facility] for immediate notification. Workstation performed: [id number]         XR chest 1 view portable   Final Result      Mild congestion with bibasilar atelectasis.                   Workstation performed: [id number]             All radiology studies independently viewed by me and interpreted by the radiologist.    Procedure    CriticalCare Time    Date/Time: 12/5/2023 5:00 PM    Performed by: Sarah Juarez DO  Authorized by: Sarah Juarez DO    Critical care provider statement:     Critical care time (minutes):  30    Critical care time was exclusive of:  Separately billable procedures and treating other patients    Critical care was necessary to treat or prevent imminent or life-threatening deterioration of the following conditions:  Respiratory failure    Critical care was time spent personally by me on the following activities:  Blood draw for specimens, obtaining history from patient or surrogate, development of treatment plan with patient or surrogate, discussions with consultants, evaluation of patient's response to treatment, examination of patient, interpretation of cardiac output measurements, ordering and performing treatments and interventions, ordering and review of laboratory studies, ordering and review of radiographic studies, re-evaluation of patient's condition and review of old charts    I assumed direction of critical care for this patient from another provider in my specialty: no          ED Course of Care and Re-Assessments      Medications   brimonidine tartrate 0.2 % ophthalmic solution 1 drop (1 drop Both Eyes Given 12/6/23 0825)   budesonide (PULMICORT) inhalation solution 0.5 mg (0.5 mg Nebulization Given 12/6/23 0715)   chlorhexidine (PERIDEX) 0.12 % oral rinse 15 mL (15 mL Mouth/Throat Given 12/6/23 0825)   heparin (porcine) subcutaneous injection 5,000 Units (5,000 Units Subcutaneous Given 12/6/23 1300)   methylPREDNISolone sodium succinate (Solu-MEDROL) injection 40 mg (40 mg Intravenous Given 12/6/23 1300)   azithromycin (ZITHROMAX) 500 mg in sodium chloride 0.9 % 250 mL IVPB (500 mg Intravenous New Bag 12/5/23 2100)   propofol (DIPRIVAN) 1000 mg in 100 mL infusion (premix) (0 mcg/kg/min × 59.8 kg Intravenous Hold 12/6/23 0752)   fentanyl citrate (PF) 100 MCG/2ML 50 mcg (50 mcg Intravenous Given 12/6/23 1045)   fentaNYL 1000 mcg in sodium chloride 0.9% 100mL infusion (0 mcg/hr Intravenous Stopped 12/6/23 0753)   ipratropium-albuterol (DUO-NEB) 0.5-2.5 mg/3 mL inhalation solution 3 mL (3 mL Nebulization Given 12/6/23 0715)   formoterol (PERFOROMIST) nebulizer solution 20 mcg (20 mcg Nebulization Given 12/6/23 0715)   ondansetron (ZOFRAN) injection 4 mg (4 mg Intravenous Given 12/6/23 1148)   senna-docusate sodium (SENOKOT S) 8.6-50 mg per tablet 1 tablet (1 tablet Oral Given 12/6/23 1036)   polyethylene glycol (MIRALAX) packet 17 g (17 g Oral Given 12/6/23 1036)   omeprazole (PRILOSEC) suspension 2 mg/mL (20 mg Oral Given 12/6/23 1036)   lidocaine (PF) (XYLOCAINE-MPF) 1 % injection **ADS Override Pull** (  Not Given 12/6/23 1129)   ondansetron (FOR EMS ONLY) (ZOFRAN) 4 mg/2 mL injection 4 mg (0 mg Does not apply Given to EMS 12/5/23 1359)   albuterol inhalation solution 10 mg (10 mg Nebulization Given 12/5/23 8908)   ipratropium (ATROVENT) 0.02 % inhalation solution 1 mg (1 mg Nebulization Given 12/5/23 0853)   sodium chloride 0.9 % inhalation solution 12 mL (12 mL Nebulization Given 12/5/23 5367)   labetalol (NORMODYNE) injection 10 mg (10 mg Intravenous Given 12/5/23 4878)   ondansetron (Nikki Soliman) injection 4 mg (4 mg Intravenous Given 12/5/23 1453)   iohexol (OMNIPAQUE) 350 MG/ML injection (SINGLE-DOSE) 85 mL (85 mL Intravenous Given 12/5/23 1517)   ondansetron (ZOFRAN) injection 4 mg (4 mg Intravenous Given 12/5/23 1921)   Succinylcholine Chloride 100 mg/5 mL syringe 60 mg (60 mg Intravenous Given 12/5/23 2200)   etomidate (AMIDATE) 2 mg/mL injection 20 mg (20 mg Intravenous Given 12/5/23 2200)   ondansetron (ZOFRAN) injection 4 mg (4 mg Intravenous Given 12/5/23 2335)   acetaminophen (TYLENOL) oral suspension 650 mg (650 mg Oral Given 12/6/23 0355)   multi-electrolyte (ISOLYTE-S PH 7.4) bolus 1,000 mL (1,000 mL Intravenous New Bag 12/6/23 1037)   potassium chloride oral solution 40 mEq (40 mEq Oral Given 12/6/23 1044)   potassium chloride 20 mEq IVPB (premix) (20 mEq Intravenous New Bag 12/6/23 1151)   fentanyl citrate (PF) 100 MCG/2ML 50 mcg (50 mcg Intravenous Given 12/6/23 1115)     I spoke with critical care who will come and evaluate the patient in the ER. FINAL IMPRESSION    Final diagnoses:   Acute on chronic respiratory failure with hypercapnia (HCC)   Acute on chronic respiratory failure with hypoxia and hypercapnia (HCC)         DISPOSITION/PLAN    Time reflects when diagnosis was documented in both MDM as applicable and the Disposition within this note       Time User Action Codes Description Comment    12/5/2023  5:00 PM Cheryl Crowley Add [J96.22] Acute on chronic respiratory failure with hypercapnia ([address])     12/6/2023 10:58 AM Sylvester Christiansen Add [J96.21,  J96.22] Acute on chronic respiratory failure with hypoxia and hypercapnia (HCC)           ED Disposition       None          Follow-up Information    None           PATIENT REFERRED TO:    No follow-up provider specified.     DISCHARGE MEDICATIONS:    Current Discharge Medication List        CONTINUE these medications which have NOT CHANGED    Details   albuterol (2.5 mg/3 mL) 0.083 % nebulizer solution TAKE 3 ML (2.5 MG TOTAL) BY NEBULIZATION EVERY 6 (SIX) HOURS AS NEEDED FOR WHEEZING OR SHORTNESS OF BREATH  Qty: 75 mL, Refills: 3    Associated Diagnoses: Chronic obstructive pulmonary disease, unspecified COPD type (HCC)      albuterol (PROVENTIL HFA,VENTOLIN HFA) 90 mcg/act inhaler INHALE 2 PUFFS EVERY 6 (SIX) HOURS AS NEEDED FOR WHEEZING  Qty: 18 g, Refills: 5    Comments: Substitution to a formulary equivalent within the same pharmaceutical class is authorized. Associated Diagnoses: Chronic obstructive pulmonary disease with acute exacerbation (HCC)      formoterol (PERFOROMIST) 20 MCG/2ML nebulizer solution Take 2 mL (20 mcg total) by nebulization 2 (two) times a day  Qty: 120 mL, Refills: 30    Associated Diagnoses: Chronic obstructive pulmonary disease, unspecified COPD type (HCC)      losartan (COZAAR) 25 mg tablet Take 2 tablets (50 mg total) by mouth 2 (two) times a day  Qty: 180 tablet, Refills: 1    Associated Diagnoses: Benign essential hypertension      predniSONE 5 mg tablet Take 1 tablet (5 mg total) by mouth daily  Qty: 30 tablet, Refills: 1    Associated Diagnoses: Chronic obstructive pulmonary disease, unspecified COPD type (HCC)      brimonidine tartrate 0.2 % ophthalmic solution       budesonide (Pulmicort) 0.5 mg/2 mL nebulizer solution Take 2 mL (0.5 mg total) by nebulization 2 (two) times a day Rinse mouth after use.   Qty: 120 mL, Refills: 5    Associated Diagnoses: Chronic obstructive pulmonary disease, unspecified COPD type (HCC)      docusate sodium (COLACE) 100 mg capsule Take 1 capsule (100 mg total) by mouth 2 (two) times a day  Refills: 0    Associated Diagnoses: Rectal bleeding      hydroxypropyl methylcellulose (GONAK) 2.5 % ophthalmic solution Apply 1 drop to eye Three times a day      montelukast (SINGULAIR) 10 mg tablet Take 1 tablet (10 mg total) by mouth daily at bedtime Diana 1 tableta en la noche para las alergias  Qty: 90 tablet, Refills: 0    Associated Diagnoses: Chronic obstructive pulmonary disease, unspecified COPD type (HCC)      pantoprazole (PROTONIX) 40 mg tablet TAKE 1 TABLET (40 MG TOTAL) BY MOUTH DAILY  Qty: 30 tablet, Refills: 5    Associated Diagnoses: Chronic gastritis without bleeding, unspecified gastritis type      polyethylene glycol (GLYCOLAX) 17 GM/SCOOP powder Take 17 g by mouth daily  Qty: 225 g, Refills: 1    Associated Diagnoses: Constipation, unspecified constipation type      polyethylene glycol (Golytely) 4000 mL solution Take 4,000 mL by mouth once for 1 dose Take 4000 mL by mouth once for 1 dose. Use as directed  Qty: 4000 mL, Refills: 0    Associated Diagnoses: Colon cancer screening      Trelegy Ellipta 200-62.5-25 MCG/ACT AEPB inhaler INHALE 1 PUFF DAILY RINSE MOUTH AFTER USE. No discharge procedures on file.          Jasvir Perkins, [address], DO  12/06/23 [id number]

## 2023-12-05 NOTE — ASSESSMENT & PLAN NOTE
Lab Results   Component Value Date    EGFR 74 12/05/2023    EGFR 81 11/16/2023    EGFR 72 11/15/2023    CREATININE 0.93 12/05/2023    CREATININE 0.79 11/16/2023    CREATININE 0.95 11/15/2023     We will monitor his renal indices and urine output closely

## 2023-12-05 NOTE — H&P
[facility]  H&P  Name: Natalia Dewitt 82 y.o. male I MRN: [id number]  Unit/Bed#: ED-32 I Date of Admission: 12/5/2023   Date of Service: 12/5/2023 I Hospital Day: 0      Assessment/Plan   * Acute on chronic respiratory failure with hypercapnia [facility])  Assessment & Plan  The patient arrived with a c/o syncope and vomiting. He was found to be hypoxic and lethargic. His initial ABG revealed an PH of 7.19 with a PCO2 of 132. He was placed on BIPAP  He is still a bit lethargic but arousable  We will repeat his ABG now- if improved, we can maintain the BIPAP but if not, he will need to be intubated  We will continue his bronchodilators  We will continue him on solumedrol- he was on prednsione as an outpatient    Chronic obstructive pulmonary disease ([address])  Assessment & Plan  We will continue his outpatient bronchodilators  We will continue solumedrol for now    Chronic kidney disease, stage 3a [facility])  Assessment & Plan  Lab Results   Component Value Date    EGFR 74 12/05/2023    EGFR 81 11/16/2023    EGFR 72 11/15/2023    CREATININE 0.93 12/05/2023    CREATININE 0.79 11/16/2023    CREATININE 0.95 11/15/2023     We will monitor his renal indices and urine output closely    Benign essential hypertension  Assessment & Plan  We will continue his cozaar    History of DVT (deep vein thrombosis)  Assessment & Plan  He has a report of a DVT but is not on anticoagulation  We will place him on heparin SQ for now      _____________________________________________________________________      HPI:    Natalia Dewitt is a 82 y.o. male who presents with a c/o lethargy, syncope and one episode of vomiting. He was found to be hypoxic with a oxygen saturation of 59%. He had an ABG done that reveals a PH of 7.19 and a PCO2 of 132. He was placed on BIPAP. All information is obtained from discussions with his family and reviewing the chart. The family states his symptoms began today.  He has a dry cough but no sputum. They deny a history of fever, chills or abdominal pain. The patient is supposed to be on oxygen but occasionally removes this. He does have sick contacts- his daughter has an URI. Review of Systems:  Review of Systems  Full 12 point review of systems was performed. Aside from what was mentioned in the HPI, it is otherwise negative. Historical Information   Past Medical History:   Diagnosis Date    Acute metabolic encephalopathy 31/24/2005    Acute on chronic respiratory failure ([address]) 12/4/2022    Age-related cataract of right eye 4/4/2023    patient is medically cleared and presents with low risk of complication with this upcoming R cataract surgery.     Anemia     Aneurysm, aorta, thoracic (HCC)     Appendicolith     Ascending aortic aneurysm (HCC)     3.7    Asthma     BPH (benign prostatic hyperplasia)     CAD (coronary artery disease)     noted on CT scan    CHF (congestive heart failure) (HCC)     COPD (chronic obstructive pulmonary disease) ([address])     Descending thoracic aortic aneurysm ([address])     Diabetes mellitus ([address])     Diverticulosis     Former tobacco use     GERD (gastroesophageal reflux disease)     History of DVT (deep vein thrombosis)     Left leg    History of transfusion     Hypertension     Inguinal hernia     right    Nephrolithiasis     Oxygen dependent     2LNC    Oxygen dependent     Pneumonia     Pre-diabetes     Prostate calculus     PVD (peripheral vascular disease) ([address])     Recurrent right inguinal hernia w incarcertion 4/8/3439    Small bowel obstruction ([address]) 12/28/2022    Thoracic aortic aneurysm without rupture ([address]) 11/19/2018    Added automatically from request for surgery [id number]    Thrombocytopenia ([address]) 12/29/2018    Ulcer     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[address])     Varicose vein of leg     b/l     Past Surgical History:   Procedure Laterality Date    CARDIAC SURGERY      ESOPHAGOGASTRODUODENOSCOPY      HERNIA REPAIR Right 1/21/2019 Procedure: REPAIR HERNIA INGUINAL WITH MESH;  Surgeon: Mitch Mansfield MD;  Location: [facility] MAIN OR;  Service: General    HERNIA REPAIR Right 2023    Procedure: REPAIR RECURRENT INCARERATED HERNIA INGUINAL OPEN, RIGHT ORCHIECTOMY;  Surgeon: Broderick Juarez MD;  Location: [facility] Main OR;  Service: General    INGUINAL HERNIA REPAIR Bilateral     IR TEVAR  2018    [facility] EVASC RPR DTA COVERAGE ART ORIGIN 1ST ENDOPROSTH N/A 2018    Procedure: TEVAR - endovascular thoracic aortic aneurysm repair;  Surgeon: Brendan Gómez MD;  Location:  MAIN OR;  Service: Vascular    THORACIC AORTIC ANEURYSM REPAIR  2018    VARICOSE VEIN SURGERY Bilateral     vein stripping     Social History   Social History     Substance and Sexual Activity   Alcohol Use Never     Social History     Substance and Sexual Activity   Drug Use No     Social History     Tobacco Use   Smoking Status Former    Packs/day: 1.00    Years: 35.00    Total pack years: 35.00    Types: Cigarettes    Start date:     Quit date:     Years since quittin.9   Smokeless Tobacco Never       Family History:   Family History   Problem Relation Age of Onset    Tuberculosis Mother     No Known Problems Father     Cancer Sister     Diabetes Family     Hypertension Family        Medications:  Pertinent medications were reviewed        Allergies   Allergen Reactions    Penicillins Hives, Itching and Rash    Lisinopril Rash     Side pains and rash     Zyprexa [Olanzapine] Tongue Swelling         Vitals:   BP (!) 171/70   Pulse 72   Temp 98.3 °F (36.8 °C) (Oral)   Resp 20   SpO2 95%   There is no height or weight on file to calculate BMI. SpO2: 95 %,   SpO2 Activity: At Rest,   O2 Device: None (Room air)    No intake or output data in the 24 hours ending 23 1745  Invasive Devices       Peripheral Intravenous Line  Duration             Peripheral IV 23 Dorsal (posterior); Left Hand <1 day    Peripheral IV 23 Left;Ventral (anterior) Forearm <1 day    Peripheral IV 12/05/23 Proximal;Right;Ventral (anterior) Forearm <1 day                    Physical Exam  Eyes:      Pupils: Pupils are equal, round, and reactive to light. Skin:     General: Skin is warm and dry. HENT:      Head: Normocephalic. Mouth/Throat:      Mouth: Mucous membranes are dry. Cardiovascular:      Rate and Rhythm: Normal rate. Abdominal:      Palpations: Abdomen is soft. Constitutional:       Appearance: He is ill-appearing. Pulmonary:      Effort: Pulmonary effort is normal.      Breath sounds: Wheezing and rales present. Neurological:      Comments: Lethargic, opens eyes to painful stimuli but falls back to sleep quickly            Diagnostic Data:  Lab: I have personally reviewed pertinent lab results. ,   CBC:  Results from last 7 days   Lab Units 12/05/23  1409   WBC Thousand/uL 9.00   HEMOGLOBIN g/dL 13.8   HEMATOCRIT % 46.4   PLATELETS Thousands/uL 120*      CMP:   Lab Results   Component Value Date    SODIUM 143 12/05/2023    K 3.6 12/05/2023    CL 95 (L) 12/05/2023    CO2 45 (H) 12/05/2023    BUN 20 12/05/2023    CREATININE 0.93 12/05/2023    CALCIUM 9.9 12/05/2023    AST 22 12/05/2023    ALT 20 12/05/2023    ALKPHOS 85 12/05/2023    EGFR 74 12/05/2023   ,   PT/INR:   Lab Results   Component Value Date    INR 0.99 12/05/2023   ,   Troponin: No results found for: "TROPONINI",   Magnesium: No components found for: "MAG",  Phosphorous: No results found for: "PHOS"    ABG:   Lab Results   Component Value Date    PHART 7.190 (LL) 12/05/2023    VWF4NIF 132.1 (HH) 12/05/2023    PO2ART 109.1 12/05/2023    [id number] 49.3 (H) 12/05/2023    BEART 14.8 12/05/2023    SOURCE Radial, Right 12/05/2023   ,     Microbiology:      Imaging: I have personally reviewed the pertinent imaging studies on the PACS system      EKG/telemetry/Echo:         VTE Prophylaxis: Heparin    Code Status: Prior    Portions of the record may have been created with voice recognition software. Occasional wrong word or "sound a like" substitutions may have occurred due to the inherent limitations of voice recognition software. Read the chart carefully and recognize, using context, where substitutions have occurred.

## 2023-12-06 ENCOUNTER — APPOINTMENT (INPATIENT)
Dept: GASTROENTEROLOGY | Facility: HOSPITAL | Age: 88
DRG: 208 | End: 2023-12-06
Attending: INTERNAL MEDICINE
Payer: MEDICARE

## 2023-12-06 PROBLEM — J96.21 ACUTE ON CHRONIC RESPIRATORY FAILURE WITH HYPOXIA AND HYPERCAPNIA (HCC): Status: ACTIVE | Noted: 2022-12-04

## 2023-12-06 LAB
ANION GAP SERPL CALCULATED.3IONS-SCNC: 10 MMOL/L
ARTERIAL PATENCY WRIST A: YES
BASE EXCESS BLDA CALC-SCNC: 12.1 MMOL/L
BASOPHILS # BLD AUTO: 0.02 THOUSANDS/ÂΜL (ref 0–0.1)
BASOPHILS NFR BLD AUTO: 1 % (ref 0–1)
BUN SERPL-MCNC: 23 MG/DL (ref 5–25)
CALCIUM SERPL-MCNC: 9.4 MG/DL (ref 8.4–10.2)
CHLORIDE SERPL-SCNC: 96 MMOL/L (ref 96–108)
CO2 SERPL-SCNC: 35 MMOL/L (ref 21–32)
CREAT SERPL-MCNC: 1.06 MG/DL (ref 0.6–1.3)
EOSINOPHIL # BLD AUTO: 0 THOUSAND/ÂΜL (ref 0–0.61)
EOSINOPHIL NFR BLD AUTO: 0 % (ref 0–6)
ERYTHROCYTE [DISTWIDTH] IN BLOOD BY AUTOMATED COUNT: 13.1 % (ref 11.6–15.1)
GFR SERPL CREATININE-BSD FRML MDRD: 63 ML/MIN/1.73SQ M
GLUCOSE SERPL-MCNC: 140 MG/DL (ref 65–140)
GLUCOSE SERPL-MCNC: 144 MG/DL (ref 65–140)
GLUCOSE SERPL-MCNC: 150 MG/DL (ref 65–140)
GLUCOSE SERPL-MCNC: 161 MG/DL (ref 65–140)
HCO3 BLDA-SCNC: 35.9 MMOL/L (ref 22–28)
HCT VFR BLD AUTO: 42.6 % (ref 36.5–49.3)
HGB BLD-MCNC: 12.8 G/DL (ref 12–17)
HOROWITZ INDEX BLDA+IHG-RTO: 50 MM[HG]
IMM GRANULOCYTES # BLD AUTO: 0.02 THOUSAND/UL (ref 0–0.2)
IMM GRANULOCYTES NFR BLD AUTO: 1 % (ref 0–2)
LYMPHOCYTES # BLD AUTO: 0.69 THOUSANDS/ÂΜL (ref 0.6–4.47)
LYMPHOCYTES NFR BLD AUTO: 16 % (ref 14–44)
MAGNESIUM SERPL-MCNC: 2 MG/DL (ref 1.9–2.7)
MCH RBC QN AUTO: 31.5 PG (ref 26.8–34.3)
MCHC RBC AUTO-ENTMCNC: 30 G/DL (ref 31.4–37.4)
MCV RBC AUTO: 105 FL (ref 82–98)
MONOCYTES # BLD AUTO: 0.17 THOUSAND/ÂΜL (ref 0.17–1.22)
MONOCYTES NFR BLD AUTO: 4 % (ref 4–12)
NEUTROPHILS # BLD AUTO: 3.45 THOUSANDS/ÂΜL (ref 1.85–7.62)
NEUTS SEG NFR BLD AUTO: 78 % (ref 43–75)
NRBC BLD AUTO-RTO: 0 /100 WBCS
O2 CT BLDA-SCNC: 17.7 ML/DL (ref 16–23)
OXYHGB MFR BLDA: 95.8 % (ref 94–97)
PCO2 BLDA: 43.3 MM HG (ref 36–44)
PEEP RESPIRATORY: 6 CM[H2O]
PH BLDA: 7.54 [PH] (ref 7.35–7.45)
PLATELET # BLD AUTO: 109 THOUSANDS/UL (ref 149–390)
PMV BLD AUTO: 10.1 FL (ref 8.9–12.7)
PO2 BLDA: 78.1 MM HG (ref 75–129)
POTASSIUM SERPL-SCNC: 3.4 MMOL/L (ref 3.5–5.3)
PROCALCITONIN SERPL-MCNC: 0.19 NG/ML
RBC # BLD AUTO: 4.06 MILLION/UL (ref 3.88–5.62)
SODIUM SERPL-SCNC: 141 MMOL/L (ref 135–147)
SPECIMEN SOURCE: ABNORMAL
VENT AC: 20
VENT- AC: AC
VT SETTING VENT: 450 ML
WBC # BLD AUTO: 4.35 THOUSAND/UL (ref 4.31–10.16)

## 2023-12-06 PROCEDURE — 83735 ASSAY OF MAGNESIUM: CPT | Performed by: NURSE PRACTITIONER

## 2023-12-06 PROCEDURE — 82948 REAGENT STRIP/BLOOD GLUCOSE: CPT

## 2023-12-06 PROCEDURE — 84145 PROCALCITONIN (PCT): CPT | Performed by: NURSE PRACTITIONER

## 2023-12-06 PROCEDURE — 99291 CRITICAL CARE FIRST HOUR: CPT | Performed by: INTERNAL MEDICINE

## 2023-12-06 PROCEDURE — 36600 WITHDRAWAL OF ARTERIAL BLOOD: CPT

## 2023-12-06 PROCEDURE — 94640 AIRWAY INHALATION TREATMENT: CPT

## 2023-12-06 PROCEDURE — 31622 DX BRONCHOSCOPE/WASH: CPT | Performed by: INTERNAL MEDICINE

## 2023-12-06 PROCEDURE — 94003 VENT MGMT INPAT SUBQ DAY: CPT

## 2023-12-06 PROCEDURE — 80048 BASIC METABOLIC PNL TOTAL CA: CPT | Performed by: NURSE PRACTITIONER

## 2023-12-06 PROCEDURE — 94760 N-INVAS EAR/PLS OXIMETRY 1: CPT

## 2023-12-06 PROCEDURE — 82805 BLOOD GASES W/O2 SATURATION: CPT | Performed by: NURSE PRACTITIONER

## 2023-12-06 PROCEDURE — 85025 COMPLETE CBC W/AUTO DIFF WBC: CPT | Performed by: NURSE PRACTITIONER

## 2023-12-06 RX ORDER — FENTANYL CITRATE 50 UG/ML
50 INJECTION, SOLUTION INTRAMUSCULAR; INTRAVENOUS ONCE
Status: COMPLETED | OUTPATIENT
Start: 2023-12-06 | End: 2023-12-06

## 2023-12-06 RX ORDER — METHYLPREDNISOLONE SODIUM SUCCINATE 40 MG/ML
40 INJECTION, POWDER, LYOPHILIZED, FOR SOLUTION INTRAMUSCULAR; INTRAVENOUS EVERY 8 HOURS SCHEDULED
Status: COMPLETED | OUTPATIENT
Start: 2023-12-06 | End: 2023-12-07

## 2023-12-06 RX ORDER — METHYLPREDNISOLONE SODIUM SUCCINATE 40 MG/ML
40 INJECTION, POWDER, LYOPHILIZED, FOR SOLUTION INTRAMUSCULAR; INTRAVENOUS DAILY
Status: DISCONTINUED | OUTPATIENT
Start: 2023-12-08 | End: 2023-12-08 | Stop reason: HOSPADM

## 2023-12-06 RX ORDER — POTASSIUM CHLORIDE 20MEQ/15ML
40 LIQUID (ML) ORAL ONCE
Status: COMPLETED | OUTPATIENT
Start: 2023-12-06 | End: 2023-12-06

## 2023-12-06 RX ORDER — ACETAMINOPHEN 325 MG/1
975 TABLET ORAL EVERY 8 HOURS SCHEDULED
Status: DISCONTINUED | OUTPATIENT
Start: 2023-12-06 | End: 2023-12-06

## 2023-12-06 RX ORDER — SODIUM CHLORIDE, SODIUM GLUCONATE, SODIUM ACETATE, POTASSIUM CHLORIDE, MAGNESIUM CHLORIDE, SODIUM PHOSPHATE, DIBASIC, AND POTASSIUM PHOSPHATE .53; .5; .37; .037; .03; .012; .00082 G/100ML; G/100ML; G/100ML; G/100ML; G/100ML; G/100ML; G/100ML
1000 INJECTION, SOLUTION INTRAVENOUS ONCE
Status: COMPLETED | OUTPATIENT
Start: 2023-12-06 | End: 2023-12-06

## 2023-12-06 RX ORDER — POTASSIUM CHLORIDE 14.9 MG/ML
20 INJECTION INTRAVENOUS
Status: COMPLETED | OUTPATIENT
Start: 2023-12-06 | End: 2023-12-06

## 2023-12-06 RX ORDER — FORMOTEROL FUMARATE 20 UG/2ML
20 SOLUTION RESPIRATORY (INHALATION)
Status: DISCONTINUED | OUTPATIENT
Start: 2023-12-06 | End: 2023-12-07

## 2023-12-06 RX ORDER — POLYETHYLENE GLYCOL 3350 17 G/17G
17 POWDER, FOR SOLUTION ORAL DAILY
Status: DISCONTINUED | OUTPATIENT
Start: 2023-12-06 | End: 2023-12-08 | Stop reason: HOSPADM

## 2023-12-06 RX ORDER — POTASSIUM CHLORIDE 29.8 MG/ML
40 INJECTION INTRAVENOUS ONCE
Status: DISCONTINUED | OUTPATIENT
Start: 2023-12-06 | End: 2023-12-06

## 2023-12-06 RX ORDER — METHYLPREDNISOLONE SODIUM SUCCINATE 40 MG/ML
40 INJECTION, POWDER, LYOPHILIZED, FOR SOLUTION INTRAMUSCULAR; INTRAVENOUS ONCE
Status: COMPLETED | OUTPATIENT
Start: 2023-12-07 | End: 2023-12-07

## 2023-12-06 RX ORDER — LIDOCAINE HYDROCHLORIDE 10 MG/ML
INJECTION, SOLUTION EPIDURAL; INFILTRATION; INTRACAUDAL; PERINEURAL
Status: DISPENSED
Start: 2023-12-06 | End: 2023-12-06

## 2023-12-06 RX ORDER — ACETAMINOPHEN 160 MG/5ML
650 SUSPENSION ORAL ONCE
Status: COMPLETED | OUTPATIENT
Start: 2023-12-06 | End: 2023-12-06

## 2023-12-06 RX ORDER — ONDANSETRON 2 MG/ML
4 INJECTION INTRAMUSCULAR; INTRAVENOUS EVERY 6 HOURS PRN
Status: DISCONTINUED | OUTPATIENT
Start: 2023-12-06 | End: 2023-12-08 | Stop reason: HOSPADM

## 2023-12-06 RX ORDER — AMOXICILLIN 250 MG
1 CAPSULE ORAL 2 TIMES DAILY
Status: DISCONTINUED | OUTPATIENT
Start: 2023-12-06 | End: 2023-12-08 | Stop reason: HOSPADM

## 2023-12-06 RX ORDER — IPRATROPIUM BROMIDE AND ALBUTEROL SULFATE 2.5; .5 MG/3ML; MG/3ML
3 SOLUTION RESPIRATORY (INHALATION)
Status: DISCONTINUED | OUTPATIENT
Start: 2023-12-06 | End: 2023-12-08 | Stop reason: HOSPADM

## 2023-12-06 RX ADMIN — IPRATROPIUM BROMIDE AND ALBUTEROL SULFATE 3 ML: 2.5; .5 SOLUTION RESPIRATORY (INHALATION) at 14:19

## 2023-12-06 RX ADMIN — BRIMONIDINE TARTRATE 1 DROP: 2 SOLUTION/ DROPS OPHTHALMIC at 02:09

## 2023-12-06 RX ADMIN — CHLORHEXIDINE GLUCONATE 15 ML: 1.2 RINSE ORAL at 08:25

## 2023-12-06 RX ADMIN — FORMOTEROL FUMARATE 20 MCG: 20 SOLUTION RESPIRATORY (INHALATION) at 07:15

## 2023-12-06 RX ADMIN — SENNOSIDES AND DOCUSATE SODIUM 1 TABLET: 8.6; 5 TABLET ORAL at 10:36

## 2023-12-06 RX ADMIN — ONDANSETRON 4 MG: 2 INJECTION INTRAMUSCULAR; INTRAVENOUS at 11:48

## 2023-12-06 RX ADMIN — HEPARIN SODIUM 5000 UNITS: 5000 INJECTION INTRAVENOUS; SUBCUTANEOUS at 13:00

## 2023-12-06 RX ADMIN — BRIMONIDINE TARTRATE 1 DROP: 2 SOLUTION/ DROPS OPHTHALMIC at 08:25

## 2023-12-06 RX ADMIN — BUDESONIDE 0.5 MG: 0.5 INHALANT ORAL at 20:27

## 2023-12-06 RX ADMIN — Medication 20 MG: at 10:36

## 2023-12-06 RX ADMIN — POTASSIUM CHLORIDE 20 MEQ: 14.9 INJECTION, SOLUTION INTRAVENOUS at 10:44

## 2023-12-06 RX ADMIN — POTASSIUM CHLORIDE 40 MEQ: 1.5 SOLUTION ORAL at 10:44

## 2023-12-06 RX ADMIN — AZITHROMYCIN 500 MG: 500 INJECTION, POWDER, LYOPHILIZED, FOR SOLUTION INTRAVENOUS at 21:38

## 2023-12-06 RX ADMIN — SODIUM CHLORIDE, SODIUM LACTATE, POTASSIUM CHLORIDE, AND CALCIUM CHLORIDE 75 ML/HR: .6; .31; .03; .02 INJECTION, SOLUTION INTRAVENOUS at 01:10

## 2023-12-06 RX ADMIN — FORMOTEROL FUMARATE 20 MCG: 20 SOLUTION RESPIRATORY (INHALATION) at 20:28

## 2023-12-06 RX ADMIN — HEPARIN SODIUM 5000 UNITS: 5000 INJECTION INTRAVENOUS; SUBCUTANEOUS at 05:12

## 2023-12-06 RX ADMIN — METHYLPREDNISOLONE SODIUM SUCCINATE 40 MG: 40 INJECTION, POWDER, FOR SOLUTION INTRAMUSCULAR; INTRAVENOUS at 21:38

## 2023-12-06 RX ADMIN — BRIMONIDINE TARTRATE 1 DROP: 2 SOLUTION/ DROPS OPHTHALMIC at 16:36

## 2023-12-06 RX ADMIN — IPRATROPIUM BROMIDE AND ALBUTEROL SULFATE 3 ML: 2.5; .5 SOLUTION RESPIRATORY (INHALATION) at 07:15

## 2023-12-06 RX ADMIN — IPRATROPIUM BROMIDE AND ALBUTEROL SULFATE 3 ML: 2.5; .5 SOLUTION RESPIRATORY (INHALATION) at 02:17

## 2023-12-06 RX ADMIN — BRIMONIDINE TARTRATE 1 DROP: 2 SOLUTION/ DROPS OPHTHALMIC at 21:37

## 2023-12-06 RX ADMIN — HEPARIN SODIUM 5000 UNITS: 5000 INJECTION INTRAVENOUS; SUBCUTANEOUS at 21:38

## 2023-12-06 RX ADMIN — METHYLPREDNISOLONE SODIUM SUCCINATE 40 MG: 40 INJECTION, POWDER, FOR SOLUTION INTRAMUSCULAR; INTRAVENOUS at 13:00

## 2023-12-06 RX ADMIN — SODIUM CHLORIDE, SODIUM GLUCONATE, SODIUM ACETATE, POTASSIUM CHLORIDE, MAGNESIUM CHLORIDE, SODIUM PHOSPHATE, DIBASIC, AND POTASSIUM PHOSPHATE 1000 ML: .53; .5; .37; .037; .03; .012; .00082 INJECTION, SOLUTION INTRAVENOUS at 10:37

## 2023-12-06 RX ADMIN — METHYLPREDNISOLONE SODIUM SUCCINATE 40 MG: 40 INJECTION, POWDER, FOR SOLUTION INTRAMUSCULAR; INTRAVENOUS at 05:12

## 2023-12-06 RX ADMIN — FENTANYL CITRATE 50 MCG: 50 INJECTION INTRAMUSCULAR; INTRAVENOUS at 10:45

## 2023-12-06 RX ADMIN — POLYETHYLENE GLYCOL 3350 17 G: 17 POWDER, FOR SOLUTION ORAL at 10:36

## 2023-12-06 RX ADMIN — IPRATROPIUM BROMIDE AND ALBUTEROL SULFATE 3 ML: 2.5; .5 SOLUTION RESPIRATORY (INHALATION) at 20:28

## 2023-12-06 RX ADMIN — ACETAMINOPHEN 650 MG: 650 SUSPENSION ORAL at 03:55

## 2023-12-06 RX ADMIN — BUDESONIDE 0.5 MG: 0.5 INHALANT ORAL at 07:15

## 2023-12-06 RX ADMIN — FENTANYL CITRATE 50 MCG: 50 INJECTION INTRAMUSCULAR; INTRAVENOUS at 11:15

## 2023-12-06 RX ADMIN — POTASSIUM CHLORIDE 20 MEQ: 14.9 INJECTION, SOLUTION INTRAVENOUS at 11:51

## 2023-12-06 NOTE — ASSESSMENT & PLAN NOTE
Lab Results   Component Value Date    EGFR 74 12/05/2023    EGFR 81 11/16/2023    EGFR 72 11/15/2023    CREATININE 0.93 12/05/2023    CREATININE 0.79 11/16/2023    CREATININE 0.95 11/15/2023     Baseline creatinine 0.8 - 0.9  We will monitor his renal indices and urine output closely  Avoid hypotension, nephrotoxins  Plan for voiding trial

## 2023-12-06 NOTE — ASSESSMENT & PLAN NOTE
CT chest: Noted to have debris in the bronchus intermedius on the right side. Afebrile. WBC 9.  Procal 0.08  Will hold off on antibiotics for now  Azithromycin was started for COPD exacerbation

## 2023-12-06 NOTE — PROCEDURES
Intubation    Date/Time: 12/5/2023 10:00 PM    Performed by: RAQUEL Woodard  Authorized by: RAQUEL Woodard    Patient location:  Bedside  Consent:     Consent obtained:  Written    Consent given by:  Spouse    Risks discussed:  Aspiration, dental trauma, laryngeal injury, hypoxia, death and bleeding    Alternatives discussed:  No treatment  Universal protocol:     Test results available and properly labeled: yes      Radiology Images displayed and confirmed. If images not available, report reviewed: yes      Required blood products, implants, devices, and special equipment available: yes      Site/side marked: yes      Immediately prior to procedure, a time out was called: yes      Patient identity confirmed: Anonymous protocol, patient vented/unresponsive and arm band  Pre-procedure details:     Patient status:  Altered mental status    Mallampati score:  2    Pretreatment medications:  Etomidate    Paralytics:  Succinylcholine  Indications:     Indications for intubation: respiratory failure and airway protection    Procedure details:     Preoxygenation:  Nonrebreather mask    CPR in progress: no      Intubation method:  Oral    Oral intubation technique:  Airtraq Glory Hy)    Laryngoscope blade: Mac 3    Tube size (mm):  7.5    Tube type:  Cuffed    Number of attempts:  1    Cricoid pressure: no      Tube visualized through cords: yes    Placement assessment:     ETT to lip:  24    Tube secured with:  ETT galvin    Breath sounds:  Equal and absent over the epigastrium    Placement verification: chest rise, condensation, colorimetric ETCO2 device, direct visualization, equal breath sounds and tube exhalation      CXR findings:  ETT in proper place    Ventilator settings:  ACVC  Post-procedure details:     Patient tolerance of procedure:   Tolerated well, no immediate complications

## 2023-12-06 NOTE — ASSESSMENT & PLAN NOTE
Suspect this is 2/2 COPD exac  Arrived with a c/o syncope and vomiting. He was found to be hypoxic and lethargic. His initial ABG revealed an pH of 7.19 with a PCO2 of 132. He was placed on BIPAP with little improvement in ABG x2 and remained lethargic. After arriving in the ICU he began vomiting while on BiPAP. Intubation was discussed with the family as he was lethargic and not protecting his airway. Following intubation ABG much improved.   COVID/flu/RSV PCR negative  Procal 0.08  Vent Day 2  ACVC 20/450/50/6  Titrate FiO2 for SpO2 > 90%  ABG in am  Continue nebs and steroids as below for COPD exacerbation  Airway clearance protocol  Repeat procal in am  No obvious infection at this point, doubt sepsis, will hold off on antibiotics  Fentanyl and propofol for sedation/analgesia  Daily SAT/SBT  If unable to extubate will need enteral feeding started

## 2023-12-06 NOTE — PROGRESS NOTES
[address] [facility] [place] [facility]  Interval Progress Note: Critical Care  Name: Irma Stinson  MRN: [id number]  Unit/Bed#: ICU 12 I Date of Admission: 12/5/2023   Date of Service: 12/5/2023 I Hospital Day: 0    Interval Events:    Patient with persistent hypercarbia on ABGs, and lethargy despite BiPAP. Now with vomiting into BiPAP mask. Given increased risk for aspiration and further decompensation, consent obtained from patient's wife at bedside for endotracheal intubation. Pertinent New Data:   blood pressure, pulse, temperature, respirations, and pulse oximetry    Physical Exam  Vitals and nursing note reviewed. Eyes:      General: Vision grossly intact. No scleral icterus. Right eye: No discharge. Left eye: No discharge. Extraocular Movements: Extraocular movements intact. Conjunctiva/sclera: Conjunctivae normal.      Comments: Left eye non-reactive at baseline from previous injury   Skin:     General: Skin is warm, dry and not mottled extremities. Capillary Refill: Capillary refill takes less than 2 seconds. Coloration: Skin is not jaundiced or pale. Findings: No lesion, rash or wound. HENT:      Head: Normocephalic and atraumatic. Right Ear: No drainage. Left Ear: No drainage. Nose: No nasal deformity, nasal tenderness, congestion, rhinorrhea, epistaxis or nasogastric tube. Mouth/Throat:      Mouth: Mucous membranes are moist. No injury or angioedema. Dentition: Abnormal dentition. Pharynx: No oropharyngeal exudate. Comments: endentulous  Neck:      Vascular: No JVD. no midline tenderness Cardiovascular:      Rate and Rhythm: Normal rate and regular rhythm. Pulses: Normal pulses. Heart sounds: Normal heart sounds. Musculoskeletal:         General: No swelling, tenderness, deformity or signs of injury. Normal range of motion. Right lower leg: No edema. Left lower leg: No edema.    Abdominal: General: There is no distension. Palpations: Abdomen is soft. Tenderness: There is no abdominal tenderness. Constitutional:       General: He is in acute distress. Appearance: He is well-developed and well-nourished. He is ill-appearing. He is not toxic-appearing. Interventions: He is not sedated, not intubated and not restrained. Pulmonary:      Effort: Bradypnea, accessory muscle usage, respiratory distress and accessory muscle usage present. He is not intubated. Breath sounds: Decreased breath sounds present. No wheezing, rhonchi or rales. Comments: Markedly decreased breath sounds bilaterally. Neurological:      Mental Status: He is lethargic. GCS: GCS eye subscore is 2. GCS verbal subscore is 1. GCS motor subscore is 4. Cranial Nerves: No facial asymmetry. Comments: Patient requires repeated attempts of stimulation to elicit response. I have personally reviewed pertinent lab results. , CBC:   Lab Results   Component Value Date    WBC 9.00 12/05/2023    HGB 13.8 12/05/2023    HCT 46.4 12/05/2023     (H) 12/05/2023    PLT 93 (L) 12/05/2023    RBC 4.37 12/05/2023    MCH 31.6 12/05/2023    MCHC 29.7 (L) 12/05/2023    RDW 13.2 12/05/2023    MPV 9.1 12/05/2023   , CMP:   Lab Results   Component Value Date    SODIUM 143 12/05/2023    K 3.6 12/05/2023    CL 95 (L) 12/05/2023    CO2 45 (H) 12/05/2023    BUN 20 12/05/2023    CREATININE 0.93 12/05/2023    CALCIUM 9.9 12/05/2023    AST 22 12/05/2023    ALT 20 12/05/2023    ALKPHOS 85 12/05/2023    EGFR 74 12/05/2023   , ABG:   Lab Results   Component Value Date    PHART 7.220 (LL) 12/05/2023    FZT2BRF 115.1 (HH) 12/05/2023    PO2ART 100.9 12/05/2023    [id number] 46.0 (H) 12/05/2023    BEART 13.1 12/05/2023    SOURCE Radial, Left 12/05/2023   , Magnesium: No components found for: "MAG", Phosphorous: No results found for: "PHOS"  chest CT scanI have personally reviewed pertinent reports.    and I have personally reviewed pertinent films in PACS      Assessment and Plan  Acute on chronic hypoxic and hypercarbic respiratory failure / COPD exacerbation  Attempted BiPAP without appropriate response in pCO2 or mental status  Intubate patient  ACVC vent settings  Check ABG 1 hours post-intubation  CXR for ETT placement  Vent bundle ordered. Fentanyl/propofol for sedation/analgesia  Plan for daily SAT/SBT when appropriate    Acute encephalopathy -- likely 2/2 hypercarbia  Continue neuro checks  Daily SAT when appropriate as above    Billing Level:  During this visit, Critical care services were medically necessary as this patient had a high probability of imminent or life-threatening deterioration due to acute encephalopathy and acute respiratory failure, required my direct attention, intervention, and personal management to implement the following: CXR interpretation , bedside management, and vent management . I have personally provided 15 minutes on 12/05/23 of critical care time, exclusive of procedures, teaching, family meetings, and any prior time recorded by providers other than myself.     SIGNATURE: Gertrudis Guadalupe

## 2023-12-06 NOTE — PROGRESS NOTES
[address] [facility]  Progress Note  Name: Ruben Taylor  MRN: [id number]  Unit/Bed#: ICU 12 I Date of Admission: 12/5/2023   Date of Service: 12/6/2023 I Hospital Day: 1    Assessment/Plan   * Acute on chronic respiratory failure with hypoxia and hypercapnia [facility])  Assessment & Plan  Suspect this is 2/2 COPD exac  Arrived with a c/o syncope and vomiting. He was found to be hypoxic and lethargic. His initial ABG revealed an pH of 7.19 with a PCO2 of 132. He was placed on BIPAP with little improvement in ABG x2 and remained lethargic. After arriving in the ICU he began vomiting while on BiPAP. Intubation was discussed with the family as he was lethargic and not protecting his airway. Following intubation ABG much improved. COVID/flu/RSV PCR negative  Procal 0.08  Vent Day 2  ACVC 20/450/50/6  Titrate FiO2 for SpO2 > 90%  ABG in am  Continue nebs and steroids as below for COPD exacerbation  Airway clearance protocol  Repeat procal in am  No obvious infection at this point, doubt sepsis, will hold off on antibiotics  Fentanyl and propofol for sedation/analgesia  Daily SAT/SBT  If unable to extubate will need enteral feeding started    Chronic obstructive pulmonary disease ([address])  Assessment & Plan  With acute exacerbation  Has history of multiple admissions over the last year. Had formal consult with  [facility] August 2023 with Dr. Rachel Fox  Pulmonary rehab ordered  PFT with ABG ordered -- not completed yet  Home regimen: budesonide, Formoterol, and Duo-Neb nebulizers. Patient unable to use MDI effectively. Home O2 2 Lpm at rest and 4 Lpm with activity. Family reports difficulty with compliance.   Now intubated as above  Continue DuoNeb q6h, Budesonide q12h, fomoterol q12h  Was started on solumedrol IV 40 mg q8h, will continue  Azithromycin q24h x3 days for COPD exacerbation  Ventilator management as above      Aspiration into airway  Assessment & Plan  CT chest: Noted to have debris in the bronchus intermedius on the right side. Afebrile. WBC 9. Procal 0.08  Will hold off on antibiotics for now  Azithromycin was started for COPD exacerbation    Chronic kidney disease, stage 3a [facility])  Assessment & Plan  Lab Results   Component Value Date    EGFR 74 12/05/2023    EGFR 81 11/16/2023    EGFR 72 11/15/2023    CREATININE 0.93 12/05/2023    CREATININE 0.79 11/16/2023    CREATININE 0.95 11/15/2023     Baseline creatinine 0.8 - 0.9  We will monitor his renal indices and urine output closely  Avoid hypotension, nephrotoxins  Plan for voiding trial    Benign essential hypertension  Assessment & Plan  We will continue his cozaar    History of DVT (deep vein thrombosis)  Assessment & Plan  He has a report of a DVT but is not on anticoagulation  We will place him on DVT ppx with heparin SC and SCDs for now             Disposition: Critical care    ICU Core Measures     Vented Patient  VAP Bundle  VAP bundle ordered     A: Assess, Prevent, and Manage Pain Has pain been assessed? Yes  Need for changes to pain regimen? No   B: Both Spontaneous Awakening Trials (SATs) and Spontaneous Breathing Trials (SBTs) Plan to perform spontaneous awakening trial today? Yes   Plan to perform spontaneous breathing trial today? Yes   Obvious barriers to extubation? No   C: Choice of Sedation RASS Goal: 0 Alert and Calm  Need for changes to sedation or analgesia regimen? No   D: Delirium CAM-ICU: Negative   E: Early Mobility  Plan for early mobility? Yes   F: Family Engagement Plan for family engagement today? Yes       Antibiotic Review: Azithromycin for COPD exacerbation     Review of Invasive Devices:     Wilfred Plan: Continue for accurate I/O monitoring for 48 hours        Prophylaxis:  VTE VTE covered by:  heparin (porcine), Subcutaneous, 5,000 Units at 12/05/23 2109       Stress Ulcer  not ordered         Significant 24hr Events     24hr events:   Admitted and started on BiPAP for acute on chronic hypoxic and hypercarbic respiratory failure. ABGs with poor improvement despite attempts at optimization of BiPAP. Vomiting on BiPAP. He was intubated overnight. ABGs improving. Subjective   Review of Systems   Unable to perform ROS: Intubated      Objective                            Vitals I/O      Most Recent Min/Max in 24hrs   Temp 99.4 °F (37.4 °C) Temp  Min: 97.8 °F (36.6 °C)  Max: 100 °F (37.8 °C)   Pulse 56 Pulse  Min: 54  Max: 86   Resp 19 Resp  Min: 12  Max: 25   /51 BP  Min: 96/49  Max: 231/109   O2 Sat 97 % SpO2  Min: 59 %  Max: 99 %      Intake/Output Summary (Last 24 hours) at 12/6/2023 0241  Last data filed at 12/6/2023 0000  Gross per 24 hour   Intake 720.75 ml   Output 375 ml   Net 345.75 ml       Diet NPO; Sips with meds    Invasive Monitoring           Physical Exam   Physical Exam  Vitals and nursing note reviewed. Eyes:      General: NeglectNo scleral icterus. Right eye: No discharge. Left eye: No discharge. Extraocular Movements: Extraocular movements intact. Conjunctiva/sclera: Conjunctivae normal.      Pupils:      Right eye: Pupil is not sluggish. Left eye: Pupil is not reactive. Comments: Hx of left eye injury. Iris mis-shaped and pupil non-reactive at baseline. Skin:     General: Skin is cool and not mottled extremities. Coloration: Skin is not pale. Findings: No rash. HENT:      Head: Normocephalic and atraumatic. Right Ear: Hearing normal. No drainage. Left Ear: Hearing normal. No drainage. Nose: No nasal deformity, nasal tenderness, congestion, rhinorrhea, epistaxis or nasogastric tube. Mouth/Throat:      Mouth: Mucous membranes are moist. No injury or angioedema. Dentition: Abnormal dentition (edentulous). Pharynx: No oropharyngeal exudate. Neck:      Vascular: No JVD. no midline tenderness Cardiovascular:      Rate and Rhythm: Normal rate and regular rhythm. Pulses: No decreased pulses. Heart sounds: No murmur heard. No friction rub. No gallop. Musculoskeletal:         General: No swelling, tenderness, deformity or signs of injury. Normal range of motion. Right lower leg: No edema. Left lower leg: No edema. Abdominal: General: Bowel sounds are normal. There is no distension. Palpations: Abdomen is soft. Tenderness: There is no abdominal tenderness. Constitutional:       General: He is not in acute distress. Appearance: He is well-developed and well-nourished. He is ill-appearing. He is not toxic-appearing. Interventions: He is sedated, intubated and restrained. Pulmonary:      Effort: Tachypnea present. No accessory muscle usage, respiratory distress or accessory muscle usage. He is intubated. Breath sounds: Examination of the right-upper field reveals decreased breath sounds. Examination of the left-upper field reveals decreased breath sounds. Examination of the right-middle field reveals decreased breath sounds. Examination of the left-middle field reveals decreased breath sounds. Examination of the right-lower field reveals decreased breath sounds. Examination of the left-lower field reveals decreased breath sounds. Decreased breath sounds present. No wheezing, rhonchi or rales. Psychiatric:         Mood and Affect:  mood and affect abnormal.        Speech: Speech is not no receptive aphasia or no expressive aphasia. Neurological:      Mental Status: He is alert. He is CAM ICU negative. GCS: GCS eye subscore is 3. GCS verbal subscore is 1. GCS motor subscore is 6. Cranial Nerves: Cranial nerves 2-12 are intact. No cranial nerve deficit, dysarthria or facial asymmetry. Sensory: Sensation is intact. Motor: Strength full and intact in all extremities. No pronator drift or motor deficit. Genitourinary/Anorectal:  Hardin present.           Diagnostic Studies      EKG: sinus rhythm on telemetry  Imagin/5 CTH: no acute intracranial abnormality  12/5 CT cap: debris with near occlusion of the bronchus intermedius, likely aspiration. Distended stomach containing ingested material.  Extensive colonic diverticulosis. I have personally reviewed pertinent reports. and I have personally reviewed pertinent films in PACS     Medications:  Scheduled PRN   azithromycin, 500 mg, Q24H  brimonidine tartrate, 1 drop, TID  budesonide, 0.5 mg, BID  chlorhexidine, 15 mL, Q12H KIKE  formoterol, 20 mcg, Q12H  heparin (porcine), 5,000 Units, Q8H [facility]  ipratropium-albuterol, 3 mL, Q6H  losartan, 50 mg, BID  methylPREDNISolone sodium succinate, 40 mg, Q8H KIKE      fentanyl citrate (PF), 50 mcg, Q2H PRN       Continuous    fentaNYL, 25 mcg/hr, Last Rate: 25 mcg/hr (12/05/23 2218)  lactated ringers, 75 mL/hr, Last Rate: 75 mL/hr (12/06/23 0110)  propofol, 5-50 mcg/kg/min, Last Rate: 15 mcg/kg/min (12/06/23 0035)         Labs:    CBC    Recent Labs     12/05/23  1409 12/05/23  1842   WBC 9.00  --    HGB 13.8  --    HCT 46.4  --    * 93*     BMP    Recent Labs     12/05/23  1409   SODIUM 143   K 3.6   CL 95*   CO2 45*   AGAP 3   BUN 20   CREATININE 0.93   CALCIUM 9.9       Coags    Recent Labs     12/05/23  1409   INR 0.99   PTT 25        Additional Electrolytes  No recent results       Blood Gas    Recent Labs     12/05/23  2304   PHART 7.418   GZM5RLN 57.2*   PO2ART 98.2   MEB5BXF 36.1*   BEART 9.6   SOURCE Radial, Left     Recent Labs     12/05/23  1818 12/05/23 2008 12/05/23  2304   PHVEN 7.290*  --   --    KBE1EJJ 90.3*  --   --    PO2VEN 48.9*  --   --    QJC6INT 42.4*  --   --    BEVEN 11.8  --   --    Q9VLEPF 80.9*  --   --    SOURCE  --    < > Radial, Left    < > = values in this interval not displayed.     LFTs  Recent Labs     12/05/23  1409   ALT 20   AST 22   ALKPHOS 85   ALB 4.3   TBILI 0.61       Infectious  Recent Labs     12/05/23  1818   PROCALCITONI 0.08     Glucose  Recent Labs     12/05/23  1409   GLUC 129               Please see Medical Critical Care attending attestation for critical care time.      Elvia Boykin

## 2023-12-06 NOTE — ASSESSMENT & PLAN NOTE
With acute exacerbation  Has history of multiple admissions over the last year. Had formal consult with [facility] August 2023 with Dr. Gustavo Amaya  Pulmonary rehab ordered  PFT with ABG ordered -- not completed yet  Home regimen: budesonide, Formoterol, and Duo-Neb nebulizers. Patient unable to use MDI effectively. Home O2 2 Lpm at rest and 4 Lpm with activity. Family reports difficulty with compliance.   Now intubated as above  Continue DuoNeb q6h, Budesonide q12h, fomoterol q12h  Was started on solumedrol IV 40 mg q8h, will continue  Azithromycin q24h x3 days for COPD exacerbation  Ventilator management as above

## 2023-12-06 NOTE — ASSESSMENT & PLAN NOTE
He has a report of a DVT but is not on anticoagulation  We will place him on DVT ppx with heparin SC and SCDs for now

## 2023-12-06 NOTE — UTILIZATION REVIEW
Initial Clinical Review    Admission: Date/Time/Statement:   Admission Orders (From admission, onward)       Ordered        12/05/23 1700  Inpatient Admission  Once                          Orders Placed This Encounter   Procedures    Inpatient Admission     Standing Status:   Standing     Number of Occurrences:   1     Order Specific Question:   Level of Care     Answer:   Level 1 Stepdown [13]     Order Specific Question:   Estimated length of stay     Answer:   More than 2 Midnights     Order Specific Question:   Certification     Answer:   I certify that inpatient services are medically necessary for this patient for a duration of greater than two midnights. See H&P and MD Progress Notes for additional information about the patient's course of treatment. ED Arrival Information       Expected   -    Arrival   12/5/2023 13:48    Acuity   Emergent              Means of arrival   Ambulance    Escorted by   [place] ([address] [place] [address])    Service   Critical Care/ICU    Admission type   Emergency              Arrival complaint   syncope             Chief Complaint   Patient presents with    Loss of Consciousness     Pt brought by ems had syncopal episode at home. Vomiting and lethargic on arrival.        Initial Presentation: 82 y.o. male pmh COPD, CKD 3a, essential HTN, DVT not on AC to ED by EMS presents w lethargy, syncope 1 event of vomiting.  HX from Family reporting symptom onset today w dry cough wo phlegm reports patient supposed to be on O2 but occasionally removes, sick contact daughter w URI    EXAM  Lethargic, GCS 2/1/4=7;  requires repeated stimulation to elicit response; accessory muscle use, bradycardia, labs ABG PH of 7.19 with a PCO2 of 132.; POX 59% placed on BiPAP, vomited requiring urgent  intubation  Inpatient ICU admission due to acute on chronic respiratory failure w hyper cpania  ICU management, mechanical vent, IV Steroids, home bronchodilators, monitor renal indices, cont home cozaar, SQ heparin  Date: 12/6/2023   Day 2: Critical Care  Critical care management, IV sedation, cont vent w plan for bronchoscopy, IV Steroids, NEBs, losartan on hold, NPO add PPI, give IVF due to CR elevation, aithromycin.  Remains critically ill at risk for death & decompensation, guarded prognosis  Date: 12/7/2023    Day 3: Has surpassed a 2nd midnight with active treatments and services, which include transferring to [facility] surg level of care 12/6 after extubation to [place], requires BiPAP HS w home study upcoming, ongoing assessments, labs, IV medications, schedule NEbs w SPEECH eval  ED Triage Vitals   Temperature Pulse Respirations Blood Pressure SpO2   12/05/23 1401 12/05/23 1357 12/05/23 1357 12/05/23 1357 12/05/23 1357   98.3 °F (36.8 °C) 83 14 (!) 231/109 (!) 59 %      Temp Source Heart Rate Source Patient Position - Orthostatic VS BP Location FiO2 (%)   12/05/23 1401 12/05/23 1357 12/05/23 1357 12/05/23 1357 12/05/23 2015   Oral Monitor Sitting Right arm 50      Pain Score       12/05/23 2218       Med Not Given for Pain - for MAR use only          Wt Readings from Last 1 Encounters:   12/06/23 62.9 kg (138 lb 10.7 oz)     Additional Vital Signs: & GCS=  Trauma Secondary Assessment - Andrea Coma Scale    Date and Time Eye Opening Best Verbal Response Best Motor Response Andrea Coma Scale Score User   12/06/23 1151 4 1 6 11 EW   12/06/23 0900 4 1 6 11 EW   12/06/23 0400 1 1 5 7 MM   12/06/23 0000 1 1 5 7 MM   12/05/23 1923 4 5 6 15 RM   12/05/23 1422 3 4 6 13      Date/Time Temp Pulse Resp BP MAP (mmHg) SpO2 FiO2 (%) Calculated FIO2 (%) - Nasal Cannula O2 Flow Rate (L/min) Nasal Cannula O2 Flow Rate (L/min) O2 Device O2 Interface Device Patient Position - Orthostatic VS   12/07/23 0747 -- -- -- -- -- 99 % -- -- -- -- -- -- --   12/07/23 0708 -- -- -- -- -- 100 % -- -- -- -- -- -- --   12/07/23 07:07:27 97.7 °F (36.5 °C) 67 16 154/75 101 100 % -- -- -- -- -- -- --   12/07/23 0204 -- -- -- -- -- 97 % -- -- -- -- -- Full face mask --   12/07/23 0157 -- -- -- -- -- 97 % -- -- -- -- -- -- --   12/06/23 2058 -- -- -- -- -- 98 % -- -- -- -- -- -- --   12/06/23 20:57:02 98.2 °F (36.8 °C) 81 18 128/60 83 98 % -- -- -- -- None (Room air) -- Lying   12/06/23 2028 -- -- -- -- -- 95 % -- -- -- -- -- -- --   12/06/23 17:43:23 98.6 °F (37 °C) 80 -- 116/76 89 96 % -- -- -- -- -- -- --   12/06/23 1615 99 °F (37.2 °C) 82 25 Abnormal  120/62 89 92 % -- -- -- -- -- -- --   12/06/23 1520 98.6 °F (37 °C) 80 27 Abnormal  -- -- 97 % -- 36 -- 4 L/min Nasal cannula -- --   12/06/23 1515 98.6 °F (37 °C) 78 24 Abnormal  127/58 82 96 % -- -- -- -- -- -- --   12/06/23 1419 -- -- -- -- -- 95 % -- -- -- -- -- -- --     Date/Time Temp Pulse Resp BP MAP (mmHg) SpO2 FiO2 (%) Calculated FIO2 (%) - Nasal Cannula O2 Flow Rate (L/min) Nasal Cannula O2 Flow Rate (L/min) O2 Device O2 Interface Device Patient Position - Orthostatic VS   12/06/23 1235 99 °F (37.2 °C) 78 13 146/73 99 94 % -- 44 -- 6 L/min Nasal cannula -- --   12/06/23 1215 98.6 °F (37 °C) 74 14 119/62 80 91 % -- -- -- -- -- -- --   12/06/23 1045 99.7 °F (37.6 °C) 78 12 118/61 88 90 % -- -- -- -- -- -- --   12/06/23 1015 99.7 °F (37.6 °C) 94 26 Abnormal  154/66 102 95 % -- -- -- -- -- -- --   12/06/23 0945 100 °F (37.8 °C) 84 26 Abnormal  114/53 75 94 % -- -- -- -- -- -- --   12/06/23 0915 100 °F (37.8 °C) 84 11 Abnormal  147/72 109 93 % -- -- -- -- Ventilator -- Lying   12/06/23 0845 100 °F (37.8 °C) 82 24 Abnormal  165/87 119 95 % -- -- -- -- -- -- --   12/06/23 0815 100 °F (37.8 °C) 80 16 156/64 100 93 % -- -- -- -- -- -- --   12/06/23 0745 100 °F (37.8 °C) 68 12 121/55 81 93 % -- -- -- -- -- -- --   12/06/23 0715 100 °F (37.8 °C) 64 15 165/82 110 93 % -- -- -- -- -- -- --   12/06/23 0604 100.4 °F (38 °C) 72 16 89/49 Abnormal  62 Abnormal  95 % -- -- -- -- -- -- --   12/06/23 0600 100.4 °F (38 °C) 72 16 -- -- 96 % -- -- -- -- -- -- --   12/06/23 0500 100.8 °F (38.2 °C) Abnormal  78 14 -- -- 97 % -- -- -- -- -- -- --   12/06/23 0445 100.8 °F (38.2 °C) Abnormal  76 12 134/58 83 97 % -- -- -- -- -- -- --   12/06/23 0403 100.8 °F (38.2 °C) Abnormal  78 20 114/55 75 96 % -- -- -- -- Ventilator -- Lying   12/06/23 0400 100.8 °F (38.2 °C) Abnormal  78 19 -- -- 96 % -- -- -- -- -- -- --   12/06/23 0333 100.4 °F (38 °C) 84 20 144/64 86 95 % -- -- -- -- -- -- --   12/06/23 0303 100.4 °F (38 °C) 72 19 122/56 80 96 % -- -- -- -- -- -- --   12/06/23 0241 99.4 °F (37.4 °C) 62 19 111/49 Abnormal  70 96 % -- -- -- -- -- -- --   12/06/23 0203 100 °F (37.8 °C) 56 19 103/51 71 97 % -- -- -- -- -- -- --   12/06/23 0200 100 °F (37.8 °C) 56 20 -- -- 97 % -- -- -- -- -- -- --   12/06/23 0103 99.7 °F (37.6 °C) 54 Abnormal  20 119/52 75 97 % -- -- -- -- -- -- --   12/06/23 0033 100 °F (37.8 °C) 60 20 96/49 Abnormal  63 Abnormal  97 % -- -- -- -- -- -- --   12/06/23 0000 99.7 °F (37.6 °C) 58 19 -- -- 97 % -- -- -- -- -- -- --   12/05/23 2333 99.3 °F (37.4 °C) 58 12 157/88 122 97 % -- -- -- -- Ventilator -- Lying   12/05/23 2300 99.3 °F (37.4 °C) 64 18 -- -- 98 % -- -- -- -- -- -- --   12/05/23 2257 98.1 °F (36.7 °C) -- -- -- -- -- -- -- -- -- -- -- --   12/05/23 2233 -- 80 21 131/67 102 92 % -- -- -- -- -- -- --   12/05/23 2224 -- 80 20 171/81 Abnormal  99 96 % -- -- -- -- -- -- --   12/05/23 2221 -- 82 25 Abnormal  196/73 Abnormal  108 99 % -- -- -- -- Ventilator -- --   12/05/23 2212 -- -- -- -- -- 99 % -- -- -- -- -- -- --   12/05/23 2207 -- -- -- -- -- 100 % -- -- -- -- -- -- --   12/05/23 2145 -- 84 13 164/81 101 99 % -- -- -- -- -- -- --   12/05/23 2130 -- 58 19 133/67 95 98 % -- -- -- -- -- -- --   12/05/23 2100 -- 66 18 140/58 90 98 % -- -- -- -- -- -- --   12/05/23 2030 -- 64 21 146/64 92 98 % -- -- -- -- -- -- --   12/05/23 2015 97.8 °F (36.6 °C) 68 18 120/60 87 95 % 50 -- -- -- BiPAP -- --   12/05/23 2009 -- -- -- -- -- 95 % -- -- -- -- -- Full face mask --   12/05/23 1815 -- 70 14 178/77 Abnormal  110 97 % -- -- -- -- None (Room air) -- --   12/05/23 1715 -- 72 16 171/70 Abnormal  101 95 % -- -- -- -- None (Room air) -- --   12/05/23 1615 -- 68 20 146/66 95 97 % -- -- -- -- BiPAP -- Sitting   12/05/23 1545 -- 66 18 150/67 97 95 % -- -- -- -- BiPAP -- --   12/05/23 1532 -- 67 18 140/65 -- 95 % -- -- -- -- BiPAP -- --   12/05/23 1454 -- 86 -- 207/155 Abnormal  -- -- -- -- -- -- -- -- --   12/05/23 1433 -- 85 16 217/100 Abnormal  -- 96 % -- -- -- -- -- Face mask Sitting   12/05/23 1424 -- -- -- -- -- 98 % -- -- -- -- -- -- --   12/05/23 1415 -- 84 12 -- -- 95 % -- -- 2 L/min -- Nasal cannula -- --   12/05/23 1401 98.3 °F (36.8 °C) -- -- -- -- -- -- -- -- -- -- -- --   12/05/23 1359 -- -- -- -- -- 98 % -- -- 15 L/min -- Non-rebreather mask -- --   12/05/23 1357 -- 83 14 231/109 Abnormal  -- 59 % Abnormal  -- -- -- -- None (Room air) -- Sitting     Weights (last 14 days)    Date/Time Weight Weight Method Height   12/06/23 0500 62.9 kg (138 lb 10.7 oz) Bed scale --   12/05/23 2030 59.8 kg (131 lb 13.4 oz) Bed scale 5' 1.42" (1.56 m)     Pertinent Labs/Diagnostic Test Results:   12/5 ecg=  Sinus rhythm with Premature supraventricular complexes  Normal ECG  When compared with ECG of 05-DEC-2023 13:49,  Premature supraventricular complexes are now Present    XR chest portable ICU   Final Result by Octavio Forrest MD (12/06 7418)      Endotracheal tube above the nano. Possible right lung base infiltrate         CT head wo contrast   Final Result by Perez Faye MD (12/05 1609)      No acute intracranial abnormality. Stable chronic microangiopathic changes within the brain. 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 CT chest abdomen pelvis w contrast   Final Result by Perez Faye MD (12/05 1630)         1. Secretions with near occlusion of the bronchus intermedius, peribronchial infiltrate in the right middle lobe and right lower lobe.  Findings could reflect aspiration. 2. Aneurysmal dilatation of the thoracoabdominal aorta with stent placement. The size of the aneurysm is minimally increased at the level of the proximal aortic arch and the aortic hiatus measuring 4.3 cm.   3. Distended stomach containing ingested material. Extensive colonic diverticulosis. 4. Multiple incidental findings described in the body of the report. The study was marked in [facility] for immediate notification. Workstation performed: [id number]         XR chest 1 view portable   Final Result by Jens Rosario MD (12/06 1228)      Mild congestion with bibasilar atelectasis.                   Workstation performed: [id number]           Results from last 7 days   Lab Units 12/05/23  1433   SARS-COV-2  Negative     Results from last 7 days   Lab Units 12/07/23  0451 12/06/23  0458 12/05/23  1842 12/05/23  1409   WBC Thousand/uL 11.24* 4.35  --  9.00   HEMOGLOBIN g/dL 11.7* 12.8  --  13.8   HEMATOCRIT % 38.9 42.6  --  46.4   PLATELETS Thousands/uL 106* 109* 93* 120*   NEUTROS ABS Thousands/µL  --  3.45  --   --          Results from last 7 days   Lab Units 12/07/23  0451 12/06/23  0752 12/05/23  1409   SODIUM mmol/L 142 141 143   POTASSIUM mmol/L 5.3 3.4* 3.6   CHLORIDE mmol/L 101 96 95*   CO2 mmol/L 40* 35* 45*   ANION GAP mmol/L 1 10 3   BUN mg/dL 23 23 20   CREATININE mg/dL 0.97 1.06 0.93   EGFR ml/min/1.73sq m 70 63 74   CALCIUM mg/dL 9.1 9.4 9.9   CALCIUM, IONIZED mmol/L 1.17  --   --    MAGNESIUM mg/dL 2.2 2.0  --    PHOSPHORUS mg/dL 2.8  --   --      Results from last 7 days   Lab Units 12/05/23  1409   AST U/L 22   ALT U/L 20   ALK PHOS U/L 85   TOTAL PROTEIN g/dL 8.0   ALBUMIN g/dL 4.3   TOTAL BILIRUBIN mg/dL 0.61     Results from last 7 days   Lab Units 12/07/23  0708 12/06/23 2056 12/06/23  1640 12/06/23  1144   POC GLUCOSE mg/dl 119 140 144* 150*     Results from last 7 days   Lab Units 12/07/23  0451 12/06/23  0752 12/05/23  1409   GLUCOSE RANDOM mg/dL 138 161* 129             No results found for: "BETA-HYDROXYBUTYRATE"   Results from last 7 days   Lab Units 12/06/23  0445 12/05/23  2304 12/05/23 2008   PH ART  7.537* 7.418 7.220*   PCO2 ART mm Hg 43.3 57.2* 115.1*   PO2 ART mm Hg 78.1 98.2 100.9   HCO3 ART mmol/L 35.9* 36.1* 46.0*   BASE EXC ART mmol/L 12.1 9.6 13.1   O2 CONTENT ART mL/dL 17.7 17.9 18.7   O2 HGB, ARTERIAL % 95.8 97.5* 95.4   ABG SOURCE  Radial, Left Radial, Left Radial, Left     Results from last 7 days   Lab Units 12/05/23  1818   PH DUSTIN  7.290*   PCO2 DUSTIN mm Hg 90.3*   PO2 DUSTIN mm Hg 48.9*   HCO3 DUSTIN mmol/L 42.4*   BASE EXC DUSTIN mmol/L 11.8   O2 CONTENT DUSTIN ml/dL 15.8   O2 HGB, VENOUS % 80.9*             Results from last 7 days   Lab Units 12/05/23  1818 12/05/23  1611 12/05/23  1409   HS TNI 0HR ng/L  --   --  14   HS TNI 2HR ng/L  --  10  --    HSTNI D2 ng/L  --  -4  --    HS TNI 4HR ng/L 20  --   --    HSTNI D4 ng/L 6  --   --          Results from last 7 days   Lab Units 12/05/23  1409   PROTIME seconds 13.3   INR  0.99   PTT seconds 25         Results from last 7 days   Lab Units 12/06/23  0752 12/05/23  1818   PROCALCITONIN ng/ml 0.19 0.08                 Results from last 7 days   Lab Units 12/05/23  1409   BNP pg/mL 97       Results from last 7 days   Lab Units 12/05/23  1433   INFLUENZA A PCR  Negative   INFLUENZA B PCR  Negative   RSV PCR  Negative           ED Treatment:   Medication Administration from 12/05/2023 1348 to 12/05/2023 1955         Date/Time Order Dose Route Action     12/05/2023 1359 EST ondansetron (FOR EMS ONLY) (ZOFRAN) 4 mg/2 mL injection 4 mg 0 mg Does not apply Given to EMS     12/05/2023 1424 EST albuterol inhalation solution 10 mg 10 mg Nebulization Given     12/05/2023 1423 EST ipratropium (ATROVENT) 0.02 % inhalation solution 1 mg 1 mg Nebulization Given     12/05/2023 1424 EST sodium chloride 0.9 % inhalation solution 12 mL 12 mL Nebulization Given     12/05/2023 1455 EST labetalol (NORMODYNE) injection 10 mg 10 mg Intravenous Given     12/05/2023 1453 EST ondansetron (ZOFRAN) injection 4 mg 4 mg Intravenous Given     12/05/2023 1821 EST lactated ringers infusion 75 mL/hr Intravenous New Bag     12/05/2023 1921 EST ondansetron (ZOFRAN) injection 4 mg 4 mg Intravenous Given          Past Medical History:   Diagnosis Date    Acute metabolic encephalopathy 13/13/2863    Acute on chronic respiratory failure ([address]) 12/4/2022    Age-related cataract of right eye 4/4/2023    patient is medically cleared and presents with low risk of complication with this upcoming R cataract surgery.     Anemia     Aneurysm, aorta, thoracic (HCC)     Appendicolith     Ascending aortic aneurysm (HCC)     3.7    Asthma     BPH (benign prostatic hyperplasia)     CAD (coronary artery disease)     noted on CT scan    CHF (congestive heart failure) (HCC)     COPD (chronic obstructive pulmonary disease) ([address])     Descending thoracic aortic aneurysm ([address])     Diabetes mellitus ([address])     Diverticulosis     Former tobacco use     GERD (gastroesophageal reflux disease)     History of DVT (deep vein thrombosis)     Left leg    History of transfusion     Hypertension     Inguinal hernia     right    Nephrolithiasis     Oxygen dependent     2LNC    Oxygen dependent     Pneumonia     Pre-diabetes     Prostate calculus     PVD (peripheral vascular disease) ([address])     Recurrent right inguinal hernia w incarcertion 4/5/7375    Small bowel obstruction ([address]) 12/28/2022    Thoracic aortic aneurysm without rupture ([address]) 11/19/2018    Added automatically from request for surgery [id number]    Thrombocytopenia ([address]) 12/29/2018    Ulcer     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) [J96.22]  Age/Sex: 82 y.o. male  Admission Orders:  Continuous cardiopulmonary & pulse oximetry  Mechanical vent w daily breathing trials  BiPAP  Oral SX  Bronchoscopy  Oral care  Neuro checks  I/O  Daily WT       Scheduled Medications:  azithromycin, 500 mg, Intravenous, Q24H  brimonidine tartrate, 1 drop, Both Eyes, TID  budesonide, 0.5 mg, Nebulization, BID  chlorhexidine, 15 mL, Mouth/Throat, Q12H KIKE  formoterol, 20 mcg, Nebulization, Q12H  heparin (porcine), 5,000 Units, Subcutaneous, Q8H [address]  ipratropium-albuterol, 3 mL, Nebulization, Q6H  lidocaine (PF), , ,   methylPREDNISolone sodium succinate, 40 mg, Intravenous, Q8H KIKE  omeprazole (PRILOSEC) suspension 2 mg/mL, 20 mg, Oral, Daily  polyethylene glycol, 17 g, Oral, Daily  potassium chloride, 20 mEq, Intravenous, Q2H  senna-docusate sodium, 1 tablet, Oral, BID      Continuous IV Infusions:  fentaNYL, 25 mcg/hr, Intravenous, Continuous 12/5 2218 & DC 12/6 1450  propofol, 5-50 mcg/kg/min, Intravenous, Titrated 12/5 2210 & DC 12/6 1450    lactated ringers infusion  Rate: 75 mL/hr Dose: 75 mL/hr  Freq: Continuous Route: IV  Indications of Use: IV Hydration  Last Dose: Stopped (12/06/23 0830)  Start: 12/05/23 1800 End: 12/06/23 0815     PRN Meds:  fentanyl citrate (PF), 50 mcg, Intravenous, Q2H PRN  lidocaine (PF), , ,   ondansetron, 4 mg, Intravenous, Q6H PRN        IP CONSULT TO CASE MANAGEMENT    Network Utilization Review Department  ATTENTION: Please call with any questions or concerns to [phone] and carefully listen to the prompts so that you are directed to the right person. All voicemails are confidential.   For Discharge needs, contact Care Management AR Support Team at [phone] opt.  2  Send all requests for admission clinical reviews, approved or denied determinations and any other requests to dedicated fax number below belonging to the campus where the patient is receiving treatment.  List of dedicated fax numbers for the Facilities:  [place] DENIALS (Administrative/Medical Necessity) [phone]   DISCHARGE SUPPORT TEAM (NETWORK) [id number] (Maternity/NICU/Pediatrics) [phone]   [address] [facility] 1000 [facility] [phone]   [address] [place] [address] [address] [place] [address] [address] [address] [facility] [address] [facility] [address] 1300 [facility] [id number] [place] [address] [phone]

## 2023-12-06 NOTE — PLAN OF CARE

## 2023-12-06 NOTE — PLAN OF CARE
Problem: SAFETY,RESTRAINT: NV/NON-SELF DESTRUCTIVE BEHAVIOR  Goal: Remains free of harm/injury (restraint for non violent/non self-detsructive behavior)  Description: INTERVENTIONS:  - Instruct patient/family regarding restraint use   - Assess and monitor physiologic and psychological status   - Provide interventions and comfort measures to meet assessed patient needs   - Identify and implement measures to help patient regain control  - Assess readiness for release of restraint   Outcome: Progressing  Goal: Returns to optimal restraint-free functioning  Description: INTERVENTIONS:  - Assess the patient's behavior and symptoms that indicate continued need for restraint  - Identify and implement measures to help patient regain control  - Assess readiness for release of restraint   Outcome: Progressing     Problem: RESPIRATORY - ADULT  Goal: Achieves optimal ventilation and oxygenation  Description: INTERVENTIONS:  - Assess for changes in respiratory status  - Assess for changes in mentation and behavior  - Position to facilitate oxygenation and minimize respiratory effort  - Oxygen administered by appropriate delivery if ordered  - Initiate smoking cessation education as indicated  - Encourage broncho-pulmonary hygiene including cough, deep breathe, Incentive Spirometry  - Assess the need for suctioning and aspirate as needed  - Assess and instruct to report SOB or any respiratory difficulty  - Respiratory Therapy support as indicated  Outcome: Progressing     Problem: Nutrition/Hydration-ADULT  Goal: Nutrient/Hydration intake appropriate for improving, restoring or maintaining nutritional needs  Description: Monitor and assess patient's nutrition/hydration status for malnutrition. Collaborate with interdisciplinary team and initiate plan and interventions as ordered. Monitor patient's weight and dietary intake as ordered or per policy. Utilize nutrition screening tool and intervene as necessary.  Determine patient's food preferences and provide high-protein, high-caloric foods as appropriate.      INTERVENTIONS:  - Monitor oral intake, urinary output, labs, and treatment plans  - Assess nutrition and hydration status and recommend course of action  - Evaluate amount of meals eaten  - Assist patient with eating if necessary   - Allow adequate time for meals  - Recommend/ encourage appropriate diets, oral nutritional supplements, and vitamin/mineral supplements  - Order, calculate, and assess calorie counts as needed  - Recommend, monitor, and adjust tube feedings and TPN/PPN based on assessed needs  - Assess need for intravenous fluids  - Provide specific nutrition/hydration education as appropriate  - Include patient/family/caregiver in decisions related to nutrition  Outcome: Progressing     Problem: COPING  Goal: Pt/Family able to verbalize concerns and demonstrate effective coping strategies  Description: INTERVENTIONS:  - Assist patient/family to identify coping skills, available support systems and cultural and spiritual values  - Provide emotional support, including active listening and acknowledgement of concerns of patient and caregivers  - Reduce environmental stimuli, as able  - Provide patient education  - Assess for spiritual pain/suffering and initiate spiritual care, including notification of Pastoral Care or love based community as needed  - Assess effectiveness of coping strategies  Outcome: Progressing  Goal: Will report anxiety at manageable levels  Description: INTERVENTIONS:  - Administer medication as ordered  - Teach and encourage coping skills  - Provide emotional support  - Assess patient/family for anxiety and ability to cope  Outcome: Progressing     Problem: Prexisting or High Potential for Compromised Skin Integrity  Goal: Skin integrity is maintained or improved  Description: INTERVENTIONS:  - Identify patients at risk for skin breakdown  - Assess and monitor skin integrity  - Assess and monitor nutrition and hydration status  - Monitor labs   - Assess for incontinence   - Turn and reposition patient  - Assist with mobility/ambulation  - Relieve pressure over bony prominences  - Avoid friction and shearing  - Provide appropriate hygiene as needed including keeping skin clean and dry  - Evaluate need for skin moisturizer/barrier cream  - Collaborate with interdisciplinary team   - Patient/family teaching  - Consider wound care consult   Outcome: Progressing

## 2023-12-07 PROBLEM — D75.89 MACROCYTOSIS: Status: ACTIVE | Noted: 2023-12-07

## 2023-12-07 PROBLEM — J44.1 CHRONIC OBSTRUCTIVE PULMONARY DISEASE WITH ACUTE EXACERBATION (HCC): Status: ACTIVE | Noted: 2018-02-01

## 2023-12-07 LAB
ANION GAP SERPL CALCULATED.3IONS-SCNC: 1 MMOL/L
BUN SERPL-MCNC: 23 MG/DL (ref 5–25)
CA-I BLD-SCNC: 1.17 MMOL/L (ref 1.12–1.32)
CALCIUM SERPL-MCNC: 9.1 MG/DL (ref 8.4–10.2)
CHLORIDE SERPL-SCNC: 101 MMOL/L (ref 96–108)
CO2 SERPL-SCNC: 40 MMOL/L (ref 21–32)
CREAT SERPL-MCNC: 0.97 MG/DL (ref 0.6–1.3)
ERYTHROCYTE [DISTWIDTH] IN BLOOD BY AUTOMATED COUNT: 13.4 % (ref 11.6–15.1)
FOLATE SERPL-MCNC: >22.3 NG/ML
GFR SERPL CREATININE-BSD FRML MDRD: 70 ML/MIN/1.73SQ M
GLUCOSE SERPL-MCNC: 119 MG/DL (ref 65–140)
GLUCOSE SERPL-MCNC: 130 MG/DL (ref 65–140)
GLUCOSE SERPL-MCNC: 138 MG/DL (ref 65–140)
GLUCOSE SERPL-MCNC: 188 MG/DL (ref 65–140)
GLUCOSE SERPL-MCNC: 222 MG/DL (ref 65–140)
HCT VFR BLD AUTO: 38.9 % (ref 36.5–49.3)
HGB BLD-MCNC: 11.7 G/DL (ref 12–17)
MAGNESIUM SERPL-MCNC: 2.2 MG/DL (ref 1.9–2.7)
MCH RBC QN AUTO: 31.5 PG (ref 26.8–34.3)
MCHC RBC AUTO-ENTMCNC: 30.1 G/DL (ref 31.4–37.4)
MCV RBC AUTO: 105 FL (ref 82–98)
PHOSPHATE SERPL-MCNC: 2.8 MG/DL (ref 2.3–4.1)
PLATELET # BLD AUTO: 106 THOUSANDS/UL (ref 149–390)
PMV BLD AUTO: 9.8 FL (ref 8.9–12.7)
POTASSIUM SERPL-SCNC: 5.3 MMOL/L (ref 3.5–5.3)
RBC # BLD AUTO: 3.72 MILLION/UL (ref 3.88–5.62)
SODIUM SERPL-SCNC: 142 MMOL/L (ref 135–147)
TRIGL SERPL-MCNC: 81 MG/DL
VIT B12 SERPL-MCNC: 317 PG/ML (ref 180–914)
WBC # BLD AUTO: 11.24 THOUSAND/UL (ref 4.31–10.16)

## 2023-12-07 PROCEDURE — 94760 N-INVAS EAR/PLS OXIMETRY 1: CPT

## 2023-12-07 PROCEDURE — 94002 VENT MGMT INPAT INIT DAY: CPT

## 2023-12-07 PROCEDURE — 84100 ASSAY OF PHOSPHORUS: CPT

## 2023-12-07 PROCEDURE — 82948 REAGENT STRIP/BLOOD GLUCOSE: CPT

## 2023-12-07 PROCEDURE — 82330 ASSAY OF CALCIUM: CPT

## 2023-12-07 PROCEDURE — 85027 COMPLETE CBC AUTOMATED: CPT

## 2023-12-07 PROCEDURE — 84478 ASSAY OF TRIGLYCERIDES: CPT

## 2023-12-07 PROCEDURE — 82607 VITAMIN B-12: CPT | Performed by: INTERNAL MEDICINE

## 2023-12-07 PROCEDURE — 83735 ASSAY OF MAGNESIUM: CPT

## 2023-12-07 PROCEDURE — 80048 BASIC METABOLIC PNL TOTAL CA: CPT

## 2023-12-07 PROCEDURE — 82746 ASSAY OF FOLIC ACID SERUM: CPT | Performed by: INTERNAL MEDICINE

## 2023-12-07 PROCEDURE — 94640 AIRWAY INHALATION TREATMENT: CPT

## 2023-12-07 PROCEDURE — 92610 EVALUATE SWALLOWING FUNCTION: CPT

## 2023-12-07 PROCEDURE — 99232 SBSQ HOSP IP/OBS MODERATE 35: CPT | Performed by: INTERNAL MEDICINE

## 2023-12-07 PROCEDURE — 99233 SBSQ HOSP IP/OBS HIGH 50: CPT | Performed by: INTERNAL MEDICINE

## 2023-12-07 RX ORDER — ALPRAZOLAM 0.25 MG/1
0.25 TABLET ORAL ONCE
Status: COMPLETED | OUTPATIENT
Start: 2023-12-07 | End: 2023-12-07

## 2023-12-07 RX ORDER — LANOLIN ALCOHOL/MO/W.PET/CERES
6 CREAM (GRAM) TOPICAL
Status: DISCONTINUED | OUTPATIENT
Start: 2023-12-07 | End: 2023-12-08 | Stop reason: HOSPADM

## 2023-12-07 RX ORDER — ACETAMINOPHEN 325 MG/1
650 TABLET ORAL EVERY 4 HOURS PRN
Status: DISCONTINUED | OUTPATIENT
Start: 2023-12-07 | End: 2023-12-08 | Stop reason: HOSPADM

## 2023-12-07 RX ORDER — HYDRALAZINE HYDROCHLORIDE 20 MG/ML
5 INJECTION INTRAMUSCULAR; INTRAVENOUS EVERY 6 HOURS PRN
Status: DISCONTINUED | OUTPATIENT
Start: 2023-12-07 | End: 2023-12-08 | Stop reason: HOSPADM

## 2023-12-07 RX ORDER — GUAIFENESIN/DEXTROMETHORPHAN 100-10MG/5
10 SYRUP ORAL EVERY 4 HOURS PRN
Status: DISCONTINUED | OUTPATIENT
Start: 2023-12-07 | End: 2023-12-08 | Stop reason: HOSPADM

## 2023-12-07 RX ADMIN — FORMOTEROL FUMARATE 20 MCG: 20 SOLUTION RESPIRATORY (INHALATION) at 07:05

## 2023-12-07 RX ADMIN — IPRATROPIUM BROMIDE AND ALBUTEROL SULFATE 3 ML: 2.5; .5 SOLUTION RESPIRATORY (INHALATION) at 01:57

## 2023-12-07 RX ADMIN — IPRATROPIUM BROMIDE AND ALBUTEROL SULFATE 3 ML: 2.5; .5 SOLUTION RESPIRATORY (INHALATION) at 19:06

## 2023-12-07 RX ADMIN — Medication 6 MG: at 00:28

## 2023-12-07 RX ADMIN — SENNOSIDES AND DOCUSATE SODIUM 1 TABLET: 8.6; 5 TABLET ORAL at 09:12

## 2023-12-07 RX ADMIN — HEPARIN SODIUM 5000 UNITS: 5000 INJECTION INTRAVENOUS; SUBCUTANEOUS at 16:47

## 2023-12-07 RX ADMIN — ALPRAZOLAM 0.25 MG: 0.25 TABLET ORAL at 21:52

## 2023-12-07 RX ADMIN — BRIMONIDINE TARTRATE 1 DROP: 2 SOLUTION/ DROPS OPHTHALMIC at 09:12

## 2023-12-07 RX ADMIN — METHYLPREDNISOLONE SODIUM SUCCINATE 40 MG: 40 INJECTION, POWDER, FOR SOLUTION INTRAMUSCULAR; INTRAVENOUS at 05:44

## 2023-12-07 RX ADMIN — POLYETHYLENE GLYCOL 3350 17 G: 17 POWDER, FOR SOLUTION ORAL at 09:12

## 2023-12-07 RX ADMIN — Medication 20 MG: at 09:12

## 2023-12-07 RX ADMIN — BUDESONIDE 0.5 MG: 0.5 INHALANT ORAL at 07:05

## 2023-12-07 RX ADMIN — IPRATROPIUM BROMIDE AND ALBUTEROL SULFATE 3 ML: 2.5; .5 SOLUTION RESPIRATORY (INHALATION) at 07:05

## 2023-12-07 RX ADMIN — BRIMONIDINE TARTRATE 1 DROP: 2 SOLUTION/ DROPS OPHTHALMIC at 21:52

## 2023-12-07 RX ADMIN — METHYLPREDNISOLONE SODIUM SUCCINATE 40 MG: 40 INJECTION, POWDER, FOR SOLUTION INTRAMUSCULAR; INTRAVENOUS at 17:22

## 2023-12-07 RX ADMIN — Medication 6 MG: at 21:52

## 2023-12-07 RX ADMIN — AZITHROMYCIN 500 MG: 500 INJECTION, POWDER, LYOPHILIZED, FOR SOLUTION INTRAVENOUS at 21:52

## 2023-12-07 RX ADMIN — BUDESONIDE 0.5 MG: 0.5 INHALANT ORAL at 19:06

## 2023-12-07 RX ADMIN — IPRATROPIUM BROMIDE AND ALBUTEROL SULFATE 3 ML: 2.5; .5 SOLUTION RESPIRATORY (INHALATION) at 13:13

## 2023-12-07 RX ADMIN — SENNOSIDES AND DOCUSATE SODIUM 1 TABLET: 8.6; 5 TABLET ORAL at 16:48

## 2023-12-07 RX ADMIN — BRIMONIDINE TARTRATE 1 DROP: 2 SOLUTION/ DROPS OPHTHALMIC at 16:47

## 2023-12-07 RX ADMIN — HEPARIN SODIUM 5000 UNITS: 5000 INJECTION INTRAVENOUS; SUBCUTANEOUS at 21:52

## 2023-12-07 NOTE — ASSESSMENT & PLAN NOTE
CT scan revealed "aneurysmal dilatation of the thoracoabdominal aorta with stent placement.   Size of the aneurysm is minimally increased at the level of the proximal aortic arch and aortic hiatus measuring 4.3 cm"  Continue outpatient surveillance with surgery

## 2023-12-07 NOTE — ASSESSMENT & PLAN NOTE
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

## 2023-12-07 NOTE — ASSESSMENT & PLAN NOTE
Patient presented with uncontrolled hypertension with a systolic blood pressure greater than 200  Patient has a history of essential hypertension and on previous outpatient visits was noted to have markedly elevated blood pressure, in the setting of not taking his medications that morning.   Blood pressure was also elevated during previous admissions  Continue home Cozaar 50 mg twice daily: Was increased recently by PCP  Blood pressure adequately controlled, 154/75, likely also exacerbated by respiratory distress  Continue to monitor and titrate as needed

## 2023-12-07 NOTE — ASSESSMENT & PLAN NOTE
Wt Readings from Last 3 Encounters:   12/06/23 62.9 kg (138 lb 10.7 oz)   11/21/23 61.2 kg (135 lb)   11/15/23 65.4 kg (144 lb 2.9 oz)     Most recent 2D echocardiogram 4/23: Left ventricular ejection fraction normal.  Grade 1 diastolic dysfunction.   Mild TR  Patient is not on maintenance diuretics  Continue ARB

## 2023-12-07 NOTE — ASSESSMENT & PLAN NOTE
Patient presented with evidence of debris in airway and probable aspiration  In the setting of vomiting  Will consult speech therapy for swallow evaluation

## 2023-12-07 NOTE — ASSESSMENT & PLAN NOTE
Lab Results   Component Value Date    EGFR 70 12/07/2023    EGFR 63 12/06/2023    EGFR 74 12/05/2023    CREATININE 0.97 12/07/2023    CREATININE 1.06 12/06/2023    CREATININE 0.93 12/05/2023   Baseline creatinine ranges 0.8-0.9  Creatinine approximately at baseline

## 2023-12-07 NOTE — PROGRESS NOTES
[facility]  Progress Note  Name: Cornell Yadav  MRN: [id number]  Unit/Bed#: [facility] 2 -01 I Date of Admission: 12/5/2023   Date of Service: 12/7/2023 I Hospital Day: 2  Addendum:  Cont bipap qhs--->will order nocturnal pulse ox with am abg to determine if requires home bipap    Assessment/Plan   * Acute on chronic respiratory failure with hypoxia and hypercapnia [facility])  Assessment & Plan  Patient is an 82-year-old male with past medical history significant for COPD, chronic respiratory failure with hypoxia on 2 L nasal cannula, uncontrolled hypertension, presented with acute respiratory failure with hypoxia/hypercapnia as well as hypertensive urgency    Initial saturations were in the 50s, and ABG revealed a pH of 7.19, with pCO2 of 132  Patient was admitted to the ICU and placed on BiPAP however vomited and required urgent intubation the evening of 12/5  Patient's acute respiratory failure with hypoxia and hypercapnia were felt to be secondary to pneumonia, and aspiration  CT scan revealed near occlusion of the bronchus intermedius with peribronchial right multilobar infiltrates  Patient underwent bronchoscopy 12/6: Normal-appearing airways without any significant secretions  Patient was extubated 12/6: And later that day was transferred to the medical floor  Adequate oxygenation on 4 L nasal cannula    Aspiration into airway  Assessment & Plan  Patient was admitted with acute hypoxic/hypercapnic respiratory failure with vomiting, requiring intubation in the ICU  Admission for concerns for possible pneumonia  CAT scan revealed "secretions with near occlusion of the bronchus intermedius, peribronchial infiltrate in the right middle lobe and right lower lobe.   Findings could reflect aspiration "  Procalcitonin was negative x 2, patient was not felt to have a bacterial pneumonia and antibiotics were not indicated, apart from azithromycin x 3 days for COPD exacerbation  Underwent bronchoscopy 12/6: No significant secretions, obstruction and normal-appearing airways  Speech therapy evaluation  Continue aspiration precautions    Chronic obstructive pulmonary disease with acute exacerbation [facility])  Assessment & Plan  Patient has a history of COPD, with multiple admissions for COPD exacerbations  Patient follows with [facility] pulmonology Dr. Chucky Stafford  Was admitted with COPD exacerbation  Continue steroids-->will change to po, as pt improving  Cont budesonide, formoterol, nebulizers  Treated with azithromycin x 3 days for COPD exacerbation  Pt has chronic hypoxia-  pulm office notes indicate he I supposed to use 2L at rest and 4 w exertion but had been using "prn"  Pt needs outpt PFT rescheduled:  did not coplete 9/23 appt    Macrocytosis  Assessment & Plan  Has a history of macrocytic anemia  Check B12, folate  Check TSH as an outpatient, will not check as an inpatient during the acute phase illness of it will likely be skewed    Chronic kidney disease, stage 3a [facility])  Assessment & Plan  Lab Results   Component Value Date    EGFR 70 12/07/2023    EGFR 63 12/06/2023    EGFR 74 12/05/2023    CREATININE 0.97 12/07/2023    CREATININE 1.06 12/06/2023    CREATININE 0.93 12/05/2023   Baseline creatinine ranges 0.8-0.9  Creatinine approximately at baseline    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)  Assessment & Plan  CT scan revealed "aneurysmal dilatation of the thoracoabdominal aorta with stent placement.   Size of the aneurysm is minimally increased at the level of the proximal aortic arch and aortic hiatus measuring 4.3 cm"  Continue outpatient surveillance with surgery    Dysphagia  Assessment & Plan  Patient presented with evidence of debris in airway and probable aspiration  In the setting of vomiting  Will consult speech therapy for swallow evaluation    Chronic diastolic CHF (congestive heart failure) (HCC)  Assessment & Plan  Wt Readings from Last 3 Encounters: 12/06/23 62.9 kg (138 lb 10.7 oz)   11/21/23 61.2 kg (135 lb)   11/15/23 65.4 kg (144 lb 2.9 oz)     Most recent 2D echocardiogram 4/23: Left ventricular ejection fraction normal.  Grade 1 diastolic dysfunction. 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 Blood pressure was also elevated during previous admissions  Continue home Cozaar 50 mg twice daily: Was increased recently by PCP  Blood pressure adequately controlled, 154/75, likely also exacerbated by respiratory distress  Continue to monitor and titrate as needed    History of DVT (deep vein thrombosis)  Assessment & Plan  Patient has a history of previous DVT:   Continue DVT prophylaxis while hospitalized       Taper steroids  DC [place]      Family: Updated patient's wife Judith Officer at bedside. Updated son "Sigrid" on speaker phone at bedside. Reviewed ICU course, test rsults, tx plan. Answered all questions    Dw pts nurse  Will dw CM on rounds    VTE Pharmacologic Prophylaxis: Heparin  VTE Mechanical Prophylaxis: sequential compression device        Certification Statement: The patient will continue to require additional inpatient hospital stay due to need for further acute intervention for copd exacerbation, PT    Possible dc in next 24-48h.  Son declines inpt rehab and wishes outpt rehab, not home therapy    Status: inpatient     Total time spent today including interview, exam, further history gathering from patient's wife, review of outpatient pulmonary office notes on 8/15 with COPD and hypoxia as well as office walking O2 study showing 2 L at rest 4 L with exertion, review of notes show PFT 9/14 appointment, review of PCP office notes 9/28 with blood pressure 168/100 and COPD, plus review of ICU course, radiology reports, lab work: Greater than 75 minutes    ===================================================================    Subjective:  Patient is examined and interviewed by me in Armenian at the bedside. History is also obtained from patient's wife at the bedside as she notes patient does not comprehend many things. Patient denies any pain anywhere at all. He specifically denies any headache. He denies any chest pain. He denies any back pain. He denies any abdominal pain. He denies any difficulty breathing. Denies any current cough. His wife notes he ate yesterday, and ate breakfast this morning without any nausea, vomiting, or GI complaints. Per patient's nurse he is tolerating a solid diet without concerns for coughing or dysphagia. Patient notes he was vomiting before admission but none currently. He notes he had a cough previously which has improved. He denies any other complaints. Physical Exam:   Temp:  [97.7 °F (36.5 °C)-100 °F (37.8 °C)] 97.7 °F (36.5 °C)  HR:  [67-94] 67  Resp:  [11-27] 16  BP: (114-154)/(53-76) 154/75    Gen:  Pleasant, non-tachypnic, non-dyspnic. Conversant. Sitting up at the side of the bed. Wife present. Heart: regular rate and rhythm, S1S2 present, no murmur, rub or gallop  Lungs: Decreased air movement, decreased breath sounds. Prolonged and expiratory phase. Faint scattered rhonchi. No current crackles. No wheezing. No accessory muscle use or respiratory distress. Abd: soft, non-tender, non-distended. NABS, no guarding, rebound or peritoneal signs. Extremities: no clubbing, cyanosis or edema.   2+pedal pulses bilaterally. Full range of motion  Neuro: awake, alert. No somnolence or lethargy. Interactive. Moving all 4 extremities  Skin: warm and dry: no petechiae, purpura and rash. LABS:   Results from last 7 days   Lab Units 12/07/23  0451 12/06/23  0458 12/05/23  1842 12/05/23  1409   WBC Thousand/uL 11.24* 4.35  --  9.00   HEMOGLOBIN g/dL 11.7* 12.8  --  13.8   HEMATOCRIT % 38.9 42.6  --  46.4   PLATELETS Thousands/uL 106* 109* 93* 120*     Results from last 7 days   Lab Units 12/07/23  0451 12/06/23  0752 12/05/23  1409   POTASSIUM mmol/L 5.3 3.4* 3.6   CHLORIDE mmol/L 101 96 95*   CO2 mmol/L 40* 35* 45*   BUN mg/dL 23 23 20   CREATININE mg/dL 0.97 1.06 0.93   CALCIUM mg/dL 9.1 9.4 9.9       Hospital Data:    12/5: Chest x-ray:  Endotracheal tube above the nano. Possible right lung base infiltrate     12/5: CT chest/abdomen/pelvis with contrast:  1. Secretions with near occlusion of the bronchus intermedius, peribronchial infiltrate in the right middle lobe and right lower lobe. Findings could reflect aspiration. 2. Aneurysmal dilatation of the thoracoabdominal aorta with stent placement. The size of the aneurysm is minimally increased at the level of the proximal aortic arch and the aortic hiatus measuring 4.3 cm.  3. Distended stomach containing ingested material. Extensive colonic diverticulosis. 4. Multiple incidental findings described in the body of the report. 12/5: CT head:  No acute intracranial abnormality. Stable chronic microangiopathic changes within the brain. 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 12/5: Chest x-ray:  Mild congestion with bibasilar atelectasis. Microbiology  12/5: Negative COVID, influenza, RSV      ---------------------------------------------------------------------------------------------------------------  This note has been constructed using a voice recognition system.

## 2023-12-07 NOTE — ASSESSMENT & PLAN NOTE
Patient was admitted with acute hypoxic/hypercapnic respiratory failure with vomiting, requiring intubation in the ICU  Admission for concerns for possible pneumonia  CAT scan revealed "secretions with near occlusion of the bronchus intermedius, peribronchial infiltrate in the right middle lobe and right lower lobe.   Findings could reflect aspiration "  Procalcitonin was negative x 2, patient was not felt to have a bacterial pneumonia and antibiotics were not indicated, apart from azithromycin x 3 days for COPD exacerbation  Underwent bronchoscopy 12/6: No significant secretions, obstruction and normal-appearing airways  Speech therapy evaluation  Continue aspiration precautions

## 2023-12-07 NOTE — NURSING NOTE
Patient noted to be hypoxic in the high 70s to low 80s on 4L NC around 0120. Patient sleeping soundly, NC correctly in place in nose, patient breathing through mouth. O2 turned up to 6L with little improvement in oxygenation. Bipap applied by RT, patient tolerated only briefly saying it was too tight. Bipap mask adjusted by RT, patient still not tolerating. Patient placed on simple mask at 6L, satting well in the high 90s. However, patient again only tolerated simple mask for a few minutes before removing and desatting into the high 70s. Patient educated multiple times, with wife translating, patient refusing to replace simple mask. NC replaced at 6L.

## 2023-12-07 NOTE — PLAN OF CARE
Problem: Potential for Falls  Goal: Patient will remain free of falls  Description: INTERVENTIONS:  - Educate patient/family on patient safety including physical limitations  - Instruct patient to call for assistance with activity   - Consult OT/PT to assist with strengthening/mobility   - Keep Call bell within reach  - Keep bed low and locked with side rails adjusted as appropriate  - Keep care items and personal belongings within reach  - Initiate and maintain comfort rounds  - Make Fall Risk Sign visible to staff  - Offer Toileting every 2 Hours, in advance of need  - Initiate/Maintain bed alarm  - Obtain necessary fall risk management equipment:  Problem: INFECTION - ADULT  Goal: Absence or prevention of progression during hospitalization  Description: INTERVENTIONS:  - Assess and monitor for signs and symptoms of infection  - Monitor lab/diagnostic results  - Monitor all insertion sites, i.e. indwelling lines, tubes, and drains  - Monitor endotracheal if appropriate and nasal secretions for changes in amount and color  - [place] appropriate cooling/warming therapies per order  - Administer medications as ordered  - Instruct and encourage patient and family to use good hand hygiene technique  - Identify and instruct in appropriate isolation precautions for identified infection/condition  Outcome: Progressing  Goal: Absence of fever/infection during neutropenic period  Description: INTERVENTIONS:  - Monitor WBC    Outcome: Progressing     Problem: SAFETY ADULT  Goal: Patient will remain free of falls  Description: INTERVENTIONS:  - Educate patient/family on patient safety including physical limitations  - Instruct patient to call for assistance with activity   - Consult OT/PT to assist with strengthening/mobility   - Keep Call bell within reach  - Keep bed low and locked with side rails adjusted as appropriate  - Keep care items and personal belongings within reach  - Initiate and maintain comfort rounds  - Make Fall Risk Sign visible to staff  - Offer Toileting every 2 Hours, in advance of need  - Initiate/Maintain bed alarm  - Obtain necessary fall risk management equipment:   - Apply yellow socks and bracelet for high fall risk patients  - Consider moving patient to room near nurses station  Outcome: Progressing  Goal: Maintain or return to baseline ADL function  Description: INTERVENTIONS:  -  Assess patient's ability to carry out ADLs; assess patient's baseline for ADL function and identify physical deficits which impact ability to perform ADLs (bathing, care of mouth/teeth, toileting, grooming, dressing, etc.)  - Assess/evaluate cause of self-care deficits   - Assess range of motion  - Assess patient's mobility; develop plan if impaired  - Assess patient's need for assistive devices and provide as appropriate  - Encourage maximum independence but intervene and supervise when necessary  - Involve family in performance of ADLs  - Assess for home care needs following discharge   - Consider OT consult to assist with ADL evaluation and planning for discharge  - Provide patient education as appropriate  Outcome: Progressing  Goal: Maintains/Returns to pre admission functional level  Description: INTERVENTIONS:  - Perform AM-PAC 6 Click Basic Mobility/ Daily Activity assessment daily.  - Set and communicate daily mobility goal to care team and patient/family/caregiver. - Collaborate with rehabilitation services on mobility goals if consulted  - Perform Range of Motion 3 times a day. - Reposition patient every 2 hours.   - Dangle patient 3 times a day  - Stand patient 3 times a day  - Ambulate patient 3 times a day  - Out of bed to chair 3 times a day   - Out of bed for meals 3 times a day  - Out of bed for toileting  - Record patient progress and toleration of activity level   Outcome: Progressing     - Apply yellow socks and bracelet for high fall risk patients  - Consider moving patient to room near nurses station  Outcome: Progressing     Problem: PAIN - ADULT  Goal: Verbalizes/displays adequate comfort level or baseline comfort level  Description: Interventions:  - Encourage patient to monitor pain and request assistance  - Assess pain using appropriate pain scale  - Administer analgesics based on type and severity of pain and evaluate response  - Implement non-pharmacological measures as appropriate and evaluate response  - Consider cultural and social influences on pain and pain management  - Notify physician/advanced practitioner if interventions unsuccessful or patient reports new pain  Outcome: Progressing

## 2023-12-07 NOTE — ASSESSMENT & PLAN NOTE
Patient has a history of COPD, with multiple admissions for COPD exacerbations  Patient follows with [facility] pulmonology Dr. Yasir Red  Was admitted with COPD exacerbation  Continue steroids-->will change to po, as pt improving  Cont budesonide, formoterol, nebulizers  Treated with azithromycin x 3 days for COPD exacerbation  Pt has chronic hypoxia-  pulm office notes indicate he I supposed to use 2L at rest and 4 w exertion but had been using "prn"  Pt needs outpt PFT rescheduled:  did not coplete 9/23 appt

## 2023-12-07 NOTE — ASSESSMENT & PLAN NOTE
Has a history of macrocytic anemia  Check B12, folate  Check TSH as an outpatient, will not check as an inpatient during the acute phase illness of it will likely be skewed

## 2023-12-07 NOTE — UTILIZATION REVIEW
Notification of Unplanned, Urgent, or   Emergency Inpatient Admission   [address] [facility], [address] [facility]  Tax ID: [id number]  NPI: [id number]  Place of Service: [address]  Admission Level of Care: Inpatient  Place of Service Code: 24     Attending Physician Information  Attending Name and NPI#: Summer Crocker [facility]  Phone: [phone]     Admission Information  Inpatient Admission Date/Time: 12/5/23  5:00 PM  Discharge Date/Time: No discharge date for patient encounter. Admitting Diagnosis Code/Description:  Syncope [R55]  Acute on chronic respiratory failure with hypercapnia [facility]) [J96.22]     Utilization Review Contact  Dragan Louis Utilization   Phone: [phone]  Fax: [phone]  Email: [email or web address]  Contact for approvals/pending authorizations, clinical reviews, and discharge. Physician Advisory Services Contact  Medical Necessity Denial & [facility]te-[facility]-Pdvm Discussion  Phone: [phone]  Fax: [phone]  Email: [email or web address]     DISCHARGE SUPPORT TEAM:  For Patients Discharge Needs & Updates  Phone: [phone] opt. 2 Fax: [phone]  Email: [email or web address]

## 2023-12-07 NOTE — SPEECH THERAPY NOTE
Speech Language/Pathology  Speech/Language Pathology  Assessment    Patient Name: Jazlyn Dos Santos  YSCWV'H Date: 12/7/2023     Problem List  Principal Problem:    Acute on chronic respiratory failure with hypoxia and hypercapnia ([address])  Active Problems:    History of DVT (deep vein thrombosis)    Benign essential hypertension    Thrombocytopenia (HCC)    Chronic diastolic CHF (congestive heart failure) (HCC)    Dysphagia    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)    Chronic kidney disease, stage 3a (HCC)    Chronic obstructive pulmonary disease with acute exacerbation (HCC)    Aspiration into airway    Macrocytosis    Past Medical History  Past Medical History:   Diagnosis Date    Acute metabolic encephalopathy 92/11/0842    Acute on chronic respiratory failure ([address]) 12/4/2022    Age-related cataract of right eye 4/4/2023    patient is medically cleared and presents with low risk of complication with this upcoming R cataract surgery.     Anemia     Aneurysm, aorta, thoracic (HCC)     Appendicolith     Ascending aortic aneurysm (HCC)     3.7    Asthma     BPH (benign prostatic hyperplasia)     CAD (coronary artery disease)     noted on CT scan    CHF (congestive heart failure) (HCC)     COPD (chronic obstructive pulmonary disease) ([address])     Descending thoracic aortic aneurysm ([address])     Diabetes mellitus ([address])     Diverticulosis     Former tobacco use     GERD (gastroesophageal reflux disease)     History of DVT (deep vein thrombosis)     Left leg    History of transfusion     Hypertension     Inguinal hernia     right    Nephrolithiasis     Oxygen dependent     2LNC    Oxygen dependent     Pneumonia     Pre-diabetes     Prostate calculus     PVD (peripheral vascular disease) ([address])     Recurrent right inguinal hernia w incarcertion 3/7/6100    Small bowel obstruction ([address]) 12/28/2022    Thoracic aortic aneurysm without rupture ([address]) 11/19/2018    Added automatically from request for surgery [id number]    Thrombocytopenia ([address]) 12/29/2018    Ulcer     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)     Varicose vein of leg     b/l     Past Surgical History  Past Surgical History:   Procedure Laterality Date    CARDIAC SURGERY      ESOPHAGOGASTRODUODENOSCOPY      HERNIA REPAIR Right 1/21/2019    Procedure: REPAIR HERNIA INGUINAL WITH MESH;  Surgeon: Sharon Read MD;  Location: [address] [place] [address] MAIN OR;  Service: General    HERNIA REPAIR Right 7/22/2023    Procedure: REPAIR RECURRENT INCARERATED HERNIA INGUINAL OPEN, RIGHT ORCHIECTOMY;  Surgeon: Vee Butler MD;  Location: [facility];  Service: General    INGUINAL HERNIA REPAIR Bilateral     IR TEVAR  12/27/2018    RI EVASC RPR DTA COVERAGE ART ORIGIN 1ST ENDOPROSTH N/A 12/27/2018    Procedure: TEVAR - endovascular thoracic aortic aneurysm repair;  Surgeon: Gilford Oscar, MD;  Location:  MAIN OR;  Service: Vascular    THORACIC AORTIC ANEURYSM REPAIR  12/27/2018    VARICOSE VEIN SURGERY Bilateral     vein stripping          Bedside Swallow Evaluation:    Summary:  Pt presented w/ wife at bedside. Both deny that he has any difficulty swallowing or chewing. He has no teeth but he chews a long time. Wife stated he hasn't vomited in a while. Pt was last seen 6/15/20 at which time he was on a regular diet and son reported he vomits "maybe once a month". Reportedly ate a fast food meal last evening w.out any overt difficulties. Today at lunch he had meatloaf, mashed potatoes, chicken noodle soup, grapes, and at least 4 ounces of thin liquid. Mastication was quite prolonged but he denied any difficulty. Bolus control, transfer, and swallow appeared timely. No cough or wet vocal quality. Oral stage appeared wfl/wnl. No overt pharyngeal s/s.  Suspect part of the CT findings may be due to refluxed or regurgitated material.     Recommendations:  Diet:regular as tolerated  Liquid:thin  Meds: as  tolerated  Supervision: assist of needed  Positioning:Upright  Strategies: chew well, slow rate, smaller meals if needed  Pt to take PO/Meds only when fully alert and upright. Oral care  Aspiration precautions  Reflux precautions  Eval only, No f/u tx indicated. Consider consult w/:  Rehab  GI  Nutrition      H&P/Admit info/ pertinent provider notes: (PMH noted above)  HPI:    Ratna Hernandez is a 82 y.o. male who presents with a c/o lethargy, syncope and one episode of vomiting. He was found to be hypoxic with a oxygen saturation of 59%. He had an ABG done that reveals a PH of 7.19 and a PCO2 of 132. He was placed on BIPAP. All information is obtained from discussions with his family and reviewing the chart. The family states his symptoms began today. He has a dry cough but no sputum. They deny a history of fever, chills or abdominal pain. The patient is supposed to be on oxygen but occasionally removes this. He does have sick contacts- his daughter has an URI. Special Studies:  CT chest abdomen 12/5  No acute intracranial abnormality. Stable chronic microangiopathic changes within the brain. 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 CT head: 12/5  No acute intracranial abnormality. Stable chronic microangiopathic changes within the brain. 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 Esophagram 6/16/20: [facility]  The study is limited secondary to inability to optimally position the patient. There is a thoracic aortic stent graft. There is redemonstration of a diverticulum at the level of the thoracic inlet. This is overall stable since the prior examination. The esophagus is otherwise normal in caliber. Esophageal motility is normal and emptying of contrast from the esophagus is prompt. No mucosal lesion, ulceration or evidence of fold thickening is seen. Gastroesophageal reflux was not able to be determined.   There is no hiatal hernia. IMPRESSION:  No significant interval change. Stable esophageal diverticulum at the level of the thoracic inlet. Remainder of the esophagus is otherwise normal in caliber. Procalcitonin:  0.19 12/6   WBC:  4.35 12/6     Previous MBS:  None    Esophagram:   The study is limited secondary to inability to optimally position the patient. There is a thoracic aortic stent graft. There is redemonstration of a diverticulum at the level of the thoracic inlet. This is overall stable since the prior examination. The esophagus is otherwise normal in caliber. Esophageal motility is normal and emptying of contrast from the esophagus is prompt. No mucosal lesion, ulceration or evidence of fold thickening is seen. Gastroesophageal reflux was not able to be determined. There is no hiatal hernia. IMPRESSION:  No significant interval change. Stable esophageal diverticulum at the level of the thoracic inlet. Remainder of the esophagus is otherwise normal in caliber. Patient's goal:none stated    Did the pt report pain? no  If yes, was nursing notified/was it addressed?  N/a    Reason for consult:  R/o aspiration  Determine safest and least restrictive diet  h/o dysphagia/esophageal    Precautions:  reflux  Fall    Food Allergies:  none   Current Diet:  regular   Premorbid diet:  regular   O2 requirement:  none   Social/Prior living  Home w/ spouse   Voice/Speech:  Minimal speech was wfl   Follows commands:  basic   Cognitive status:  alert     Oral [facility] exam:  Dentition:edentulous  Lips (VII):+  Tongue (XII):+  Secretion management:+    Esophageal stage:  H/o GERD  H/o vomiting    Results d/w:  Pt, nursing

## 2023-12-07 NOTE — ASSESSMENT & PLAN NOTE
Patient is an 61-year-old male with past medical history significant for COPD, chronic respiratory failure with hypoxia on 2 L nasal cannula, uncontrolled hypertension, presented with acute respiratory failure with hypoxia/hypercapnia as well as hypertensive urgency    Initial saturations were in the 50s, and ABG revealed a pH of 7.19, with pCO2 of 132  Patient was admitted to the ICU and placed on BiPAP however vomited and required urgent intubation the evening of 12/5  Patient's acute respiratory failure with hypoxia and hypercapnia were felt to be secondary to pneumonia, and aspiration  CT scan revealed near occlusion of the bronchus intermedius with peribronchial right multilobar infiltrates  Patient underwent bronchoscopy 12/6: Normal-appearing airways without any significant secretions  Patient was extubated 12/6: And later that day was transferred to the medical floor  Adequate oxygenation on 4 L nasal cannula

## 2023-12-07 NOTE — PROGRESS NOTES
Progress Note - Pulmonary   Severiano Feliciano 82 y.o. male MRN: [id number]  Unit/Bed#: [address] [address]01 Encounter: [id number]      Assessment and Plan:  Acute on chronic hypercapnic and hypoxic respiratory failure  COPD of unknown severity in acute exacerbation  HTN  CKD stage III - baseline creatinine 0.8-0.9  Hx of DVT  Chronic constipation  Diverticulosis  Nicotine dependence, cigarettes, in remission - smoked 1/2 ppd x 60 years, quit in 2001     The patient continues to improve.  -Continue IV steroids with nebulized bronchodilators. Change to prednisone tomorrow and decrease by 10 mg every 3 days until finished.  -Continue azithromycin for 3 days. No further fevers with negative procalcitonin. Just treat for exacerbation of COPD. - Increase ambulation as able. - Elevated pCO2 in setting of acute exacerbation. Unclear if he will qualify for home BiPAP. Check overnight pulse oximetry and a.m. ABG. Plan of care discussed with the patient and spouse. Questions and concerns addressed. Chief complaint:  He is good    Subjective:   Found sitting at the side of the bed. Wife in the room. Patient states he feels good. Feels back to normal.  Denies shortness of breath. Denies significant cough. Wife at bedside agrees with patient. Objective:   Afebrile. Vitals: Blood pressure 153/82, pulse 71, temperature (!) 97.4 °F (36.3 °C), resp. rate 16, height 5' 1.42" (1.56 m), weight 62.9 kg (138 lb 10.7 oz), SpO2 96 %. , 4L, Body mass index is 25.85 kg/m². Intake/Output Summary (Last 24 hours) at 12/7/2023 1516  Last data filed at 12/7/2023 1301  Gross per 24 hour   Intake 240 ml   Output 1235 ml   Net -995 ml         Physical Exam  GEN: WDWN, nad, comfortable  HEENT: NCAT, EOMI  CVS: regular  LUNGS: decreased b/l bs  ABD: soft  EXT: No c/c/e  NEURO: No focal deficits  MS: Moving all extremities  SKIN: warm, dry  PSYCH: calm, cooperative    Labs:  I have personally reviewed pertinent lab results. Results from last 7 days   Lab Units 12/07/23  0451 12/06/23  0458 12/05/23  1842 12/05/23  1409   WBC Thousand/uL 11.24* 4.35  --  9.00   HEMOGLOBIN g/dL 11.7* 12.8  --  13.8   HEMATOCRIT % 38.9 42.6  --  46.4   PLATELETS Thousands/uL 106* 109* 93* 120*   NEUTROS PCT %  --  78*  --   --    MONOS PCT %  --  4  --   --    MONO PCT %  --   --   --  3*   EOS PCT %  --  0  --  14*      Results from last 7 days   Lab Units 12/07/23  0451 12/06/23  0752 12/05/23  1409   POTASSIUM mmol/L 5.3 3.4* 3.6   CHLORIDE mmol/L 101 96 95*   CO2 mmol/L 40* 35* 45*   BUN mg/dL 23 23 20   CREATININE mg/dL 0.97 1.06 0.93   CALCIUM mg/dL 9.1 9.4 9.9   ALK PHOS U/L  --   --  85   ALT U/L  --   --  20   AST U/L  --   --  22     Results from last 7 days   Lab Units 12/07/23  0451 12/06/23  0752   MAGNESIUM mg/dL 2.2 2.0     Results from last 7 days   Lab Units 12/07/23  0451   PHOSPHORUS mg/dL 2.8      Results from last 7 days   Lab Units 12/05/23  1409   INR  0.99   PTT seconds 25         0   Lab Value Date/Time    TROPONINI 0.02 08/05/2021 0934    TROPONINI <0.02 06/11/2020 2234    Patricia Severin <0.01 05/28/2020 0048    TROPONINI <0.01 11/17/2019 1943    Patricia Severin <0.01 09/30/2019 1232    TROPONINI <0.01 03/21/2019 2348    TROPONINI <0.01 12/02/2018 0245    TROPONINI <0.01 08/29/2018 1101     Labs per my review reveal normal renal function, metabolic alkalosis, anemia, leukocytosis worse; thrombocytopenia unchanged    Microbiology:  Flu/COVID/RSV negative      HENOK Ricks [facility]

## 2023-12-07 NOTE — ASSESSMENT & PLAN NOTE
Patient is an 73-year-old male with past medical history significant for COPD, chronic respiratory failure with hypoxia on 2 L nasal cannula, uncontrolled hypertension, presented with acute respiratory failure with hypoxia/hypercapnia as well as hypertensive urgency  Initial saturations were in the 50s, and ABG revealed a pH of 7.19, with pCO2 of 132  Patient was admitted to the ICU and placed on BiPAP however vomited and required urgent intubation the evening of 12/5  Patient's acute respiratory failure with hypoxia and hypercapnia were felt to be secondary to pneumonia, and aspiration  CT scan revealed near occlusion of the bronchus intermedius with peribronchial right multilobar infiltrates  Patient underwent bronchoscopy 12/6: Normal-appearing airways without any significant secretions  Patient was extubated 12/6: And later that day was transferred to the medical floor  Adequate oxygenation on 4 L nasal cannula  Nocturnal Pulse Ox and morning ABG obtained to evaluate if patient qualifies for BiPAP  Patient does not qualify for BiPAP

## 2023-12-08 VITALS
BODY MASS INDEX: 25.56 KG/M2 | WEIGHT: 135.36 LBS | HEART RATE: 59 BPM | SYSTOLIC BLOOD PRESSURE: 167 MMHG | HEIGHT: 61 IN | DIASTOLIC BLOOD PRESSURE: 89 MMHG | RESPIRATION RATE: 18 BRPM | OXYGEN SATURATION: 97 % | TEMPERATURE: 96 F

## 2023-12-08 LAB
ALBUMIN SERPL BCP-MCNC: 3.4 G/DL (ref 3.5–5)
ALP SERPL-CCNC: 64 U/L (ref 34–104)
ALT SERPL W P-5'-P-CCNC: 24 U/L (ref 7–52)
ANION GAP SERPL CALCULATED.3IONS-SCNC: -2 MMOL/L
ARTERIAL PATENCY WRIST A: YES
AST SERPL W P-5'-P-CCNC: 31 U/L (ref 13–39)
BASE EXCESS BLDA CALC-SCNC: 11.1 MMOL/L
BASOPHILS # BLD AUTO: 0.01 THOUSANDS/ÂΜL (ref 0–0.1)
BASOPHILS NFR BLD AUTO: 0 % (ref 0–1)
BILIRUB SERPL-MCNC: 0.45 MG/DL (ref 0.2–1)
BUN SERPL-MCNC: 23 MG/DL (ref 5–25)
CALCIUM ALBUM COR SERPL-MCNC: 9.6 MG/DL (ref 8.3–10.1)
CALCIUM SERPL-MCNC: 9.1 MG/DL (ref 8.4–10.2)
CHLORIDE SERPL-SCNC: 101 MMOL/L (ref 96–108)
CO2 SERPL-SCNC: 41 MMOL/L (ref 21–32)
CREAT SERPL-MCNC: 0.89 MG/DL (ref 0.6–1.3)
EOSINOPHIL # BLD AUTO: 0 THOUSAND/ÂΜL (ref 0–0.61)
EOSINOPHIL NFR BLD AUTO: 0 % (ref 0–6)
ERYTHROCYTE [DISTWIDTH] IN BLOOD BY AUTOMATED COUNT: 13.4 % (ref 11.6–15.1)
GFR SERPL CREATININE-BSD FRML MDRD: 77 ML/MIN/1.73SQ M
GLUCOSE SERPL-MCNC: 108 MG/DL (ref 65–140)
GLUCOSE SERPL-MCNC: 109 MG/DL (ref 65–140)
GLUCOSE SERPL-MCNC: 110 MG/DL (ref 65–140)
HCO3 BLDA-SCNC: 38.5 MMOL/L (ref 22–28)
HCT VFR BLD AUTO: 43 % (ref 36.5–49.3)
HGB BLD-MCNC: 12.9 G/DL (ref 12–17)
IMM GRANULOCYTES # BLD AUTO: 0.05 THOUSAND/UL (ref 0–0.2)
IMM GRANULOCYTES NFR BLD AUTO: 1 % (ref 0–2)
LYMPHOCYTES # BLD AUTO: 0.88 THOUSANDS/ÂΜL (ref 0.6–4.47)
LYMPHOCYTES NFR BLD AUTO: 9 % (ref 14–44)
MCH RBC QN AUTO: 31.2 PG (ref 26.8–34.3)
MCHC RBC AUTO-ENTMCNC: 30 G/DL (ref 31.4–37.4)
MCV RBC AUTO: 104 FL (ref 82–98)
MONOCYTES # BLD AUTO: 0.81 THOUSAND/ÂΜL (ref 0.17–1.22)
MONOCYTES NFR BLD AUTO: 8 % (ref 4–12)
NEUTROPHILS # BLD AUTO: 7.88 THOUSANDS/ÂΜL (ref 1.85–7.62)
NEUTS SEG NFR BLD AUTO: 82 % (ref 43–75)
NON VENT ROOM AIR: 4 %
NRBC BLD AUTO-RTO: 0 /100 WBCS
O2 CT BLDA-SCNC: 18.6 ML/DL (ref 16–23)
OXYHGB MFR BLDA: 97.8 % (ref 94–97)
PCO2 BLDA: 63.9 MM HG (ref 36–44)
PH BLDA: 7.4 [PH] (ref 7.35–7.45)
PLATELET # BLD AUTO: 107 THOUSANDS/UL (ref 149–390)
PMV BLD AUTO: 9.6 FL (ref 8.9–12.7)
PO2 BLDA: 108 MM HG (ref 75–129)
POTASSIUM SERPL-SCNC: 4.5 MMOL/L (ref 3.5–5.3)
PROT SERPL-MCNC: 6.3 G/DL (ref 6.4–8.4)
RBC # BLD AUTO: 4.14 MILLION/UL (ref 3.88–5.62)
SODIUM SERPL-SCNC: 140 MMOL/L (ref 135–147)
SPECIMEN SOURCE: ABNORMAL
WBC # BLD AUTO: 9.63 THOUSAND/UL (ref 4.31–10.16)

## 2023-12-08 PROCEDURE — 94762 N-INVAS EAR/PLS OXIMTRY CONT: CPT

## 2023-12-08 PROCEDURE — 85025 COMPLETE CBC W/AUTO DIFF WBC: CPT | Performed by: INTERNAL MEDICINE

## 2023-12-08 PROCEDURE — 80053 COMPREHEN METABOLIC PANEL: CPT | Performed by: INTERNAL MEDICINE

## 2023-12-08 PROCEDURE — 99232 SBSQ HOSP IP/OBS MODERATE 35: CPT | Performed by: INTERNAL MEDICINE

## 2023-12-08 PROCEDURE — 94640 AIRWAY INHALATION TREATMENT: CPT

## 2023-12-08 PROCEDURE — 94760 N-INVAS EAR/PLS OXIMETRY 1: CPT

## 2023-12-08 PROCEDURE — 97162 PT EVAL MOD COMPLEX 30 MIN: CPT

## 2023-12-08 PROCEDURE — 82805 BLOOD GASES W/O2 SATURATION: CPT | Performed by: INTERNAL MEDICINE

## 2023-12-08 PROCEDURE — 36600 WITHDRAWAL OF ARTERIAL BLOOD: CPT

## 2023-12-08 PROCEDURE — 82948 REAGENT STRIP/BLOOD GLUCOSE: CPT

## 2023-12-08 PROCEDURE — 99239 HOSP IP/OBS DSCHRG MGMT >30: CPT | Performed by: PHYSICIAN ASSISTANT

## 2023-12-08 PROCEDURE — 97166 OT EVAL MOD COMPLEX 45 MIN: CPT

## 2023-12-08 RX ORDER — PREDNISONE 5 MG/1
5 TABLET ORAL DAILY
Qty: 30 TABLET | Refills: 0 | COMMUNITY
Start: 2023-12-21

## 2023-12-08 RX ORDER — PREDNISONE 10 MG/1
TABLET ORAL
Qty: 30 TABLET | Refills: 0 | Status: SHIPPED | OUTPATIENT
Start: 2023-12-09 | End: 2023-12-21

## 2023-12-08 RX ORDER — BUDESONIDE 0.25 MG/2ML
0.5 INHALANT ORAL
Status: DISCONTINUED | OUTPATIENT
Start: 2023-12-08 | End: 2023-12-08 | Stop reason: HOSPADM

## 2023-12-08 RX ADMIN — METHYLPREDNISOLONE SODIUM SUCCINATE 40 MG: 40 INJECTION, POWDER, FOR SOLUTION INTRAMUSCULAR; INTRAVENOUS at 09:21

## 2023-12-08 RX ADMIN — BRIMONIDINE TARTRATE 1 DROP: 2 SOLUTION/ DROPS OPHTHALMIC at 09:21

## 2023-12-08 RX ADMIN — POLYETHYLENE GLYCOL 3350 17 G: 17 POWDER, FOR SOLUTION ORAL at 09:21

## 2023-12-08 RX ADMIN — BUDESONIDE 0.5 MG: 0.5 INHALANT ORAL at 07:07

## 2023-12-08 RX ADMIN — HEPARIN SODIUM 5000 UNITS: 5000 INJECTION INTRAVENOUS; SUBCUTANEOUS at 05:02

## 2023-12-08 RX ADMIN — Medication 20 MG: at 09:21

## 2023-12-08 RX ADMIN — IPRATROPIUM BROMIDE AND ALBUTEROL SULFATE 3 ML: 2.5; .5 SOLUTION RESPIRATORY (INHALATION) at 14:07

## 2023-12-08 RX ADMIN — SENNOSIDES AND DOCUSATE SODIUM 1 TABLET: 8.6; 5 TABLET ORAL at 09:21

## 2023-12-08 RX ADMIN — IPRATROPIUM BROMIDE AND ALBUTEROL SULFATE 3 ML: 2.5; .5 SOLUTION RESPIRATORY (INHALATION) at 07:08

## 2023-12-08 NOTE — PROGRESS NOTES
Progress Note - Pulmonary   Severiano Feliciano 82 y.o. male MRN: [id number]  Unit/Bed#: [address] [address] -01 Encounter: [id number]    Assessment/Plan:    Acute on chronic hypoxic and hypercapnic respiratory failure  -98% on 2 L NC, wears 2 L NC supplemental O2 at home  -Maintain oxygen saturation greater than 89%  -Pulmonary hygiene: Deep breathing with cough, OOB as tolerated, incentive spirometry and flutter valve hourly  -Patient may benefit from pulmonary rehab outpatient  -Overnight pulse ox completed with ABGs, patient does not qualify for BiPAP at this time. Will go home on supplemental O2    COPD of unknown severity with acute exacerbation  -No formal diagnosis, PFTs are ordered for patient. He has not completed them yet  -Bronchoscopy completed 12/6/2023, no significant secretions noted normal-appearing airways  -Azithromycin 3-day course completed  -Okay to switch Solu-Medrol to p.o. prednisone 40 mg, decrease by 10 mg every 3 days with anticipation of discharge  -Continue budesonide &formoterol nebulizer  -Follow-up outpatient    Dysphagia  -Patient presented with debris in airway secondary to aspiration from vomiting  -Speech completed swallow eval.  Patient recommendations to chew well and slow smaller meals if needed. Regular diet thin liquids    Tobacco abuse, in remission  -Has 30-pack-year history of smoking  -Quit in 2001    Chief Complaint:    I want to go home    Subjective:    Patient seen and examined at bedside. Family present. Patient denies any overnight events. Objective:    Vitals: Blood pressure 167/89, pulse 59, temperature (!) 96 °F (35.6 °C), temperature source Axillary, resp. rate 18, height 5' 1.42" (1.56 m), weight 61.4 kg (135 lb 5.8 oz), SpO2 98 %.2 L,Body mass index is 25.23 kg/m².       Intake/Output Summary (Last 24 hours) at 12/8/2023 1320  Last data filed at 12/8/2023 0010  Gross per 24 hour   Intake 240 ml   Output --   Net 240 ml       Invasive Devices       Peripheral Intravenous Line  Duration             Peripheral IV 12/05/23 Left;Ventral (anterior) Forearm 2 days    Peripheral IV 12/05/23 Proximal;Right;Ventral (anterior) Forearm 2 days                    Physical Exam:     Physical Exam  Constitutional:       General: He is not in acute distress. Appearance: Normal appearance. He is normal weight. He is not ill-appearing, toxic-appearing or diaphoretic. HENT:      Head: Normocephalic and atraumatic. Right Ear: External ear normal.      Left Ear: External ear normal.      Nose: Nose normal.      Mouth/Throat:      Mouth: Mucous membranes are moist.      Pharynx: Oropharynx is clear. Eyes:      Pupils: Pupils are equal, round, and reactive to light. Cardiovascular:      Rate and Rhythm: Normal rate and regular rhythm. Pulses: Normal pulses. Heart sounds: Normal heart sounds. No murmur heard. Pulmonary:      Effort: Pulmonary effort is normal. No respiratory distress. Breath sounds: No stridor. No wheezing, rhonchi or rales. Comments: Creased aeration noted throughout all lung fields  Chest:      Chest wall: No tenderness. Abdominal:      General: Bowel sounds are normal.      Tenderness: There is no abdominal tenderness. There is no guarding. Musculoskeletal:         General: No swelling. Normal range of motion. Cervical back: Normal range of motion. Right lower leg: No edema. Left lower leg: No edema. Skin:     General: Skin is warm and dry. Neurological:      Mental Status: He is alert. Mental status is at baseline. Motor: No weakness. Psychiatric:         Mood and Affect: Mood normal.         Behavior: Behavior normal.         Thought Content: Thought content normal.         Judgment: Judgment normal.         Labs: I have personally reviewed pertinent lab results. , ABG:   Lab Results   Component Value Date    PHART 7.398 12/08/2023    MXT3UBW 63.9 (HH) 12/08/2023    PO2ART 108.0 12/08/2023    JCR6FVD 38.5 (H) 12/08/2023    BEART 11.1 12/08/2023    SOURCE Radial, Right 12/08/2023   , BNP: No results found for: "BNP", CBC:   Lab Results   Component Value Date    WBC 9.63 12/08/2023    HGB 12.9 12/08/2023    HCT 43.0 12/08/2023     (H) 12/08/2023     (L) 12/08/2023    RBC 4.14 12/08/2023    MCH 31.2 12/08/2023    MCHC 30.0 (L) 12/08/2023    RDW 13.4 12/08/2023    MPV 9.6 12/08/2023    NRBC 0 12/08/2023   , CMP:   Lab Results   Component Value Date    SODIUM 140 12/08/2023    K 4.5 12/08/2023     12/08/2023    CO2 41 (H) 12/08/2023    BUN 23 12/08/2023    CREATININE 0.89 12/08/2023    CALCIUM 9.1 12/08/2023    AST 31 12/08/2023    ALT 24 12/08/2023    ALKPHOS 64 12/08/2023    EGFR 77 12/08/2023           Imaging and other studies: I have personally reviewed pertinent reports.     No recent imaging

## 2023-12-08 NOTE — PLAN OF CARE
Problem: Potential for Falls  Goal: Patient will remain free of falls  Description: INTERVENTIONS:  - Educate patient/family on patient safety including physical limitations  - Instruct patient to call for assistance with activity   - Consult OT/PT to assist with strengthening/mobility   - Keep Call bell within reach  - Keep bed low and locked with side rails adjusted as appropriate  - Keep care items and personal belongings within reach  - Initiate and maintain comfort rounds  - Make Fall Risk Sign visible to staff  - Offer Toileting every 2 Hours, in advance of need  - Initiate/Maintain bed alarm  - Obtain necessary fall risk management equipment: bed alarm  - Apply yellow socks and bracelet for high fall risk patients  - Consider moving patient to room near nurses station  Outcome: Progressing     Problem: PAIN - ADULT  Goal: Verbalizes/displays adequate comfort level or baseline comfort level  Description: Interventions:  - Encourage patient to monitor pain and request assistance  - Assess pain using appropriate pain scale  - Administer analgesics based on type and severity of pain and evaluate response  - Implement non-pharmacological measures as appropriate and evaluate response  - Consider cultural and social influences on pain and pain management  - Notify physician/advanced practitioner if interventions unsuccessful or patient reports new pain  Outcome: Progressing     Problem: INFECTION - ADULT  Goal: Absence or prevention of progression during hospitalization  Description: INTERVENTIONS:  - Assess and monitor for signs and symptoms of infection  - Monitor lab/diagnostic results  - Monitor all insertion sites, i.e. indwelling lines, tubes, and drains  - Monitor endotracheal if appropriate and nasal secretions for changes in amount and color  - [place] appropriate cooling/warming therapies per order  - Administer medications as ordered  - Instruct and encourage patient and family to use good hand hygiene technique  - Identify and instruct in appropriate isolation precautions for identified infection/condition  Outcome: Progressing  Goal: Absence of fever/infection during neutropenic period  Description: INTERVENTIONS:  - Monitor WBC    Outcome: Progressing     Problem: SAFETY ADULT  Goal: Patient will remain free of falls  Description: INTERVENTIONS:  - Educate patient/family on patient safety including physical limitations  - Instruct patient to call for assistance with activity   - Consult OT/PT to assist with strengthening/mobility   - Keep Call bell within reach  - Keep bed low and locked with side rails adjusted as appropriate  - Keep care items and personal belongings within reach  - Initiate and maintain comfort rounds  - Make Fall Risk Sign visible to staff  - Offer Toileting every 2 Hours, in advance of need  - Initiate/Maintain bed alarm  - Obtain necessary fall risk management equipment: bed alarm  - Apply yellow socks and bracelet for high fall risk patients  - Consider moving patient to room near nurses station  Outcome: Progressing  Goal: Maintain or return to baseline ADL function  Description: INTERVENTIONS:  - Educate patient/family on patient safety including physical limitations  - Instruct patient to call for assistance with activity   - Consult OT/PT to assist with strengthening/mobility   - Keep Call bell within reach  - Keep bed low and locked with side rails adjusted as appropriate  - Keep care items and personal belongings within reach  - Initiate and maintain comfort rounds  - Make Fall Risk Sign visible to staff  - Offer Toileting every 2 Hours, in advance of need  - Initiate/Maintain bed alarm  - Obtain necessary fall risk management equipment: bed alarm  - Apply yellow socks and bracelet for high fall risk patients  - Consider moving patient to room near nurses station  Outcome: Progressing  Goal: Maintains/Returns to pre admission functional level  Description: INTERVENTIONS:  - Assess patient's ability to carry out ADLs; assess patient's baseline for ADL function and identify physical deficits which impact ability to perform ADLs (bathing, care of mouth/teeth, toileting, grooming, dressing, etc.)  - Assess/evaluate cause of self-care deficits   - Assess range of motion  - Assess patient's mobility; develop plan if impaired  - Assess patient's need for assistive devices and provide as appropriate  - Encourage maximum independence but intervene and supervise when necessary  - Involve family in performance of ADLs  - Assess for home care needs following discharge   - Consider OT consult to assist with ADL evaluation and planning for discharge  - Provide patient education as appropriate  Outcome: Progressing     Problem: DISCHARGE PLANNING  Goal: Discharge to home or other facility with appropriate resources  Description: INTERVENTIONS:  - Identify barriers to discharge w/patient and caregiver  - Arrange for needed discharge resources and transportation as appropriate  - Identify discharge learning needs (meds, wound care, etc.)  - Arrange for interpretive services to assist at discharge as needed  - Refer to Case Management Department for coordinating discharge planning if the patient needs post-hospital services based on physician/advanced practitioner order or complex needs related to functional status, cognitive ability, or social support system  Outcome: Progressing     Problem: Knowledge Deficit  Goal: Patient/family/caregiver demonstrates understanding of disease process, treatment plan, medications, and discharge instructions  Description: Complete learning assessment and assess knowledge base.   Interventions:  - Provide teaching at level of understanding  - Provide teaching via preferred learning methods  Outcome: Progressing     Problem: RESPIRATORY - ADULT  Goal: Achieves optimal ventilation and oxygenation  Description: INTERVENTIONS:  - Assess for changes in respiratory status  - Assess for changes in mentation and behavior  - Position to facilitate oxygenation and minimize respiratory effort  - Oxygen administered by appropriate delivery if ordered  - Initiate smoking cessation education as indicated  - Encourage broncho-pulmonary hygiene including cough, deep breathe, Incentive Spirometry  - Assess the need for suctioning and aspirate as needed  - Assess and instruct to report SOB or any respiratory difficulty  - Respiratory Therapy support as indicated  Outcome: Progressing

## 2023-12-08 NOTE — CASE MANAGEMENT
Case Management Assessment    Patient name [facility]  Location [place] 2 /[place] 2 Eleanor Randhawa* MRN [id number]  : 1935 Date 2023       Current Admission Date: 2023  Current Admission Diagnosis:Acute on chronic respiratory failure with hypoxia and hypercapnia [facility])   Patient Active Problem List    Diagnosis Date Noted    Macrocytosis 2023    Aspiration into airway 2023    Constipation 2023    Prediabetes 10/25/2023    Bilateral hearing loss 10/25/2023    PAC (premature atrial contraction) 2023    Hypoxemia 2023    Vitamin B12 deficiency 2023    Vitamin D deficiency 2023    Chronic kidney disease, stage 3a ([address]) 2023    Weight loss 2022    Acute on chronic respiratory failure with hypoxia and hypercapnia ([address]) 2022    Supplemental oxygen dependent 2022    Rectal bleeding 2021    Hyperlipidemia 2021    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    Dysphagia 2020    TIA (transient ischemic attack) 2020    S/P hernia repair 2019    Acquired renal cyst of left kidney 2019    Chronic diastolic CHF (congestive heart failure) ([address]) 2019    Thrombocytopenia ([address]) 2018    Varicose veins of both lower extremities with pain 2018    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    Chronic respiratory failure with hypoxia ([address]) 2018    Descending aortic aneurysm ([address]) 2018    History of DVT (deep vein thrombosis) 2018    Chronic obstructive pulmonary disease with acute exacerbation ([address]) 2018    Benign essential hypertension 2017      LOS (days): 3  Geometric Mean LOS (GMLOS) (days): 5.20  Days to GMLOS:2.3     OBJECTIVE:    Risk of Unplanned Readmission Score: 23.86         Current admission status: Inpatient       Preferred Pharmacy:   [facility], [address] [place]  [address] [facility]  [address] [place] [address]  Phone: [phone] Fax: [address] [address]  Phone: [phone] Fax: [phone]    Primary Care Provider: Apurva Burch MD    Primary Insurance: Girish Miguel MEDICARE [facility] RE[facility]  Secondary Insurance: [address]    ASSESSMENT:  Active Health Care Proxies    There are no active Health Care Proxies on file. Readmission Root Cause  30 Day Readmission: Yes  Who directed you to return to the hospital?: Family  Did you understand whom to contact if you had questions or problems?: Yes  Did you get your prescriptions before you left the hospital?: Yes  Were you able to get your prescriptions filled when you left the hospital?: Yes  Did you take your medications as prescribed?: Yes  Were you able to get to your follow-up appointments?: Yes  During previous admission, was a post-acute recommendation made?: Yes  What post-acute resources were offered?: OP Therapy  Patient was readmitted due to: Hypercapnia  Action Plan: Respiratory-Sleep Stucy-BiPap Tx and new DME    Patient Information  Admitted from[de-identified] Home  Mental Status: Alert  During Assessment patient was accompanied by: Spouse  Assessment information provided by[de-identified] Spouse  Primary Caregiver: Family  Caregiver's Name<[facility] [place]> Jim Burton [phone] & [facility]) [phone]  Caregiver's Relationship to Patient[de-identified] Family Member  [facility] Number[facility] Jim Burton [phone] & [facility]) [phone]  Support Systems: Spouse/significant other, Family members, [address] of Residence: [address] [place] [address] do you live in?: Jaime mares entry access options.  Select all that apply.: Stairs  Number of steps to enter home.: 5  Type of Current Residence: 2 story home  Living Arrangements: Lives w/ Spouse/significant other  Is patient a ?: No    Activities of Daily Living Prior to Admission  Functional Status: Assistance  Completes ADLs independently?: No  Level of ADL dependence: Assistance  Ambulates independently?: No  Level of ambulatory dependence: Assistance  Does patient use assisted devices?: Yes  Assisted Devices (DME) used: Portable Oxygen tanks, Home Oxygen concentrator, [facility], [address] [place] [address], [address] [place], [place] [address] Name (respiratory supplies): [employer]  O2 Rate(s): 4  Does patient currently own DME?: No  Does patient have a history of Outpatient Therapy (PT/OT)?: No  Does the patient have a history of Short-Term Rehab?: No  Does patient have a history of HHC?: No  Does patient currently have [facility]?: No    Current Home Health Care  Type of Current Home Care Services: Other (Comment) (NON-SKILLED WAIVER HHAs)  Patient Information Continued  Income Source: Pension/skilled nursing  Does patient have prescription coverage?: Yes  Does patient receive dialysis treatments?: No  Does patient have a history of substance abuse?: No  Does patient have a history of Mental Health Diagnosis?: No  Means of Transportation  Means of Transport to App[de-identified] Family transport      Housing Stability: Low Risk  (12/8/2023)    Housing Stability Vital Sign     Unable to Pay for Housing in the Last Year: No     Number of Places Lived in the Last Year: 1     Unstable Housing in the Last Year: No   Food Insecurity: No Food Insecurity (12/8/2023)    Hunger Vital Sign     Worried About Running Out of Food in the Last Year: Never true     Ran Out of Food in the Last Year: Never true   Transportation Needs: No Transportation Needs (12/8/2023)    PRAPARE - Transportation     Lack of Transportation (Medical): No     Lack of Transportation (Non-Medical):  No   Recent Concern: Transportation Needs - Unmet Transportation Needs (10/25/2023)    PRAPARE - Transportation     Lack of Transportation (Medical): Yes     Lack of Transportation (Non-Medical): Yes   Utilities: Not At Risk (12/8/2023)    [facility] Utilities     Threatened with loss of utilities: No

## 2023-12-08 NOTE — ASSESSMENT & PLAN NOTE
Has a history of macrocytic anemia  B12 and folate WNL  Check TSH as an outpatient, will not check as an inpatient during the acute phase illness of it will likely be skewed

## 2023-12-08 NOTE — DISCHARGE SUMMARY
[facility]  Discharge- Sienna Locket [birth year], 82 y.o. male MRN: [id number]  Unit/Bed#: [address] [address]01 Encounter: [id number]  Primary Care Provider: Iza Mota MD   Date and time admitted to hospital: 12/5/2023  1:48 PM    * Acute on chronic respiratory failure with hypoxia and hypercapnia [facility])  Assessment & Plan  Patient is an 61-year-old male with past medical history significant for COPD, chronic respiratory failure with hypoxia on 2 L nasal cannula, uncontrolled hypertension, presented with acute respiratory failure with hypoxia/hypercapnia as well as hypertensive urgency  Initial saturations were in the 50s, and ABG revealed a pH of 7.19, with pCO2 of 132  Patient was admitted to the ICU and placed on BiPAP however vomited and required urgent intubation the evening of 12/5  Patient's acute respiratory failure with hypoxia and hypercapnia were felt to be secondary to pneumonia, and aspiration  CT scan revealed near occlusion of the bronchus intermedius with peribronchial right multilobar infiltrates  Patient underwent bronchoscopy 12/6: Normal-appearing airways without any significant secretions  Patient was extubated 12/6: And later that day was transferred to the medical floor  Adequate oxygenation on 4 L nasal cannula  Nocturnal Pulse Ox and morning ABG obtained to evaluate if patient qualifies for BiPAP  Patient does not qualify for BiPAP      Macrocytosis  Assessment & Plan  Has a history of macrocytic anemia  B12 and folate WNL  Check TSH as an outpatient, will not check as an inpatient during the acute phase illness of it will likely be skewed    Aspiration into airway  Assessment & Plan  Patient was admitted with acute hypoxic/hypercapnic respiratory failure with vomiting, requiring intubation in the ICU  Admission for concerns for possible pneumonia  CAT scan revealed "secretions with near occlusion of the bronchus intermedius, peribronchial infiltrate in the right middle lobe and right lower lobe. Findings could reflect aspiration "  Procalcitonin was negative x 2, patient was not felt to have a bacterial pneumonia and antibiotics were not indicated, apart from azithromycin x 3 days for COPD exacerbation  Underwent bronchoscopy 12/6: No significant secretions, obstruction and normal-appearing airways  Continue aspiration precautions    Chronic obstructive pulmonary disease with acute exacerbation [facility])  Assessment & Plan  Patient has a history of COPD, with multiple admissions for COPD exacerbations  Patient follows with [facility] pulmonology Dr. Rachel Fox  Was admitted with COPD exacerbation  Continue steroids-->switched to oral prednisone today with taper of 10mg every 3 days  Cont budesonide, formoterol, nebulizers  Treated with azithromycin x 3 days for COPD exacerbation  Pt has chronic hypoxia-  pulm office notes indicate he I supposed to use 2L at rest and 4 w exertion but had been using "prn"  Pt needs outpt PFT rescheduled:  did not complete 9/23 appt    Chronic kidney disease, stage 3a [facility])  Assessment & Plan  Lab Results   Component Value Date    EGFR 77 12/08/2023    EGFR 70 12/07/2023    EGFR 63 12/06/2023    CREATININE 0.89 12/08/2023    CREATININE 0.97 12/07/2023    CREATININE 1.06 12/06/2023   Baseline creatinine ranges 0.8-0.9  Creatinine approximately at baseline    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)  Assessment & Plan  CT scan revealed "aneurysmal dilatation of the thoracoabdominal aorta with stent placement.   Size of the aneurysm is minimally increased at the level of the proximal aortic arch and aortic hiatus measuring 4.3 cm"  Continue outpatient surveillance with surgery    Dysphagia  Assessment & Plan  Patient presented with evidence of debris in airway and probable aspiration  In the setting of vomiting  Speech consulted-regular diet with thins    Chronic diastolic CHF (congestive heart failure) ([address])  Assessment & Plan  Wt Readings from Last 3 Encounters:   12/08/23 61.4 kg (135 lb 5.8 oz)   11/21/23 61.2 kg (135 lb)   11/15/23 65.4 kg (144 lb 2.9 oz)     Most recent 2D echocardiogram 4/23: Left ventricular ejection fraction normal.  Grade 1 diastolic dysfunction. 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, low-grade ITP    Benign essential hypertension  Assessment & Plan  Patient presented with uncontrolled hypertension with a systolic blood pressure greater than 200  Continue home Cozaar 50 mg twice daily: Was increased recently by PCP    History of DVT (deep vein thrombosis)  Assessment & Plan  Patient has a history of previous DVT:   Continue DVT prophylaxis while hospitalized      Medical Problems       Resolved Problems  Date Reviewed: 12/8/2023   None       Discharging Physician / Practitioner: Yue Flores PA-C  PCP: Libby Porter MD  Admission Date:   Admission Orders (From admission, onward)       Ordered        12/05/23 1700  Inpatient Admission  Once                          Discharge Date: 12/08/23    Consultations During Hospital Stay:  Pulmonology    Procedures Performed:   Bronchoscopy  Result Date: 12/6/2023    Impression: No significant secretions noted; normal appearing airways RECOMMENDATION: Extubate when appropriate     Significant Findings / Test Results:     XR chest 1 view portable  Result Date: 12/6/2023    Impression: Mild congestion with bibasilar atelectasis. Workstation performed: [id number]     XR chest portable ICU  Result Date: 12/6/2023    Impression: Endotracheal tube above the nano. Possible right lung base infiltrate Workstation performed: [id number]     CT chest abdomen pelvis w contrast  Result Date: 12/5/2023    Impression: 1.  Secretions with near occlusion of the bronchus intermedius, peribronchial infiltrate in the right middle lobe and right lower lobe. Findings could reflect aspiration. 2. Aneurysmal dilatation of the thoracoabdominal aorta with stent placement. The size of the aneurysm is minimally increased at the level of the proximal aortic arch and the aortic hiatus measuring 4.3 cm. 3. Distended stomach containing ingested material. Extensive colonic diverticulosis. 4. Multiple incidental findings described in the body of the report. The study was marked in [facility] for immediate notification. Workstation performed: [id number]     CT head wo contrast  Result Date: 12/5/2023    Impression: No acute intracranial abnormality. Stable chronic microangiopathic changes within the brain. 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 Workstation performed: [id number]     Incidental Findings:   None     Test Results Pending at Discharge (will require follow up): None     Outpatient Tests Requested:  None    Complications:  None    Reason for Admission: Acute respiratory failure    Hospital Course:   Hardik Pride is a 82 y.o. male patient with past medical history of COPD, stage III CKD, chronic respiratory failure continued on oxygen, hypertension, history of DVT who originally presented to the hospital on 12/5/2023 due to syncope, lethargy, and vomiting. Upon arrival to the ED he was found to be hypoxic with an oxygen saturation in the 50s. An ABG was then performed which revealed a pH of 7.1 and a pCO2 of 132. At that time, he was placed on BiPAP and subsequently admitted to the intensive care unit. Unfortunately, the patient had an episode of vomiting while on BiPAP and required urgent intubation on the evening of 12/5. He underwent bronchoscopy on 12/6 which revealed normal-appearing airways without any significant secretions and he was subsequently extubated.   Later that same day, he was transferred to the medical floor. He has been oxygenating appropriately on 4 L nasal cannula oxygen and has been receiving IV steroid therapy. Pulmonology evaluated the patient in consultation and recommended transitioning to oral steroids. The patient underwent nocturnal pulse oximetry with morning ABG to determine if he required home BiPAP which revealed he did not need BiPAP. Please see above list of diagnoses and related plan for additional information. Condition at Discharge: stable    Discharge Day Visit / Exam:   Subjective: The patient is seen resting comfortably in bed. He is very eager to go home today, translation was provided from the patient's daughter over the phone. All questions were answered, return precautions were discussed with the patient and the patient's family members at length and they expressed understanding. He will return to the emergency department if he begins to experience any worsening shortness of breath, continued vomiting, chest pain, fevers or chills. Vitals: Blood Pressure: 167/89 (12/08/23 0757)  Pulse: 59 (12/08/23 0757)  Temperature: (!) 96 °F (35.6 °C) (12/08/23 0757)  Temp Source: Axillary (12/08/23 0757)  Respirations: 18 (12/08/23 0757)  Height: 5' 1.42" (156 cm) (12/05/23 2030)  Weight - Scale: 61.4 kg (135 lb 5.8 oz) (12/08/23 0600)  SpO2: 97 % (12/08/23 1407)  Exam:   Physical Exam  Vitals and nursing note reviewed. Constitutional:       General: He is not in acute distress. Appearance: He is ill-appearing. He is not toxic-appearing or diaphoretic. Comments: Chronically ill appearing   HENT:      Head: Normocephalic and atraumatic. Cardiovascular:      Rate and Rhythm: Normal rate and regular rhythm. Heart sounds: No murmur heard. No friction rub. No gallop. Pulmonary:      Effort: Pulmonary effort is normal. No respiratory distress. Breath sounds: Normal breath sounds. No wheezing, rhonchi or rales.    Abdominal:      General: Abdomen is flat. Bowel sounds are normal. There is no distension. Palpations: Abdomen is soft. Tenderness: There is no abdominal tenderness. Musculoskeletal:      Right lower leg: No edema. Left lower leg: No edema. Skin:     General: Skin is warm and dry. Coloration: Skin is not jaundiced or pale. Neurological:      General: No focal deficit present. Mental Status: He is alert. Mental status is at baseline. Discussion with Family: Updated  (wife) at bedside. and daughter over the phone. Discharge instructions/Information to patient and family:   See after visit summary for information provided to patient and family. Provisions for Follow-Up Care:  See after visit summary for information related to follow-up care and any pertinent home health orders. Mobility at time of Discharge:   Basic Mobility Inpatient Raw Score: 19  JH-HLM Goal: 6: Walk 10 steps or more  JH-HLM Achieved: 7: Walk 25 feet or more  HLM Goal achieved. Continue to encourage appropriate mobility. Disposition:   Home    Planned Readmission: n/a     Discharge Statement:  I spent 55 minutes discharging the patient. This time was spent on the day of discharge. I had direct contact with the patient on the day of discharge. Greater than 50% of the total time was spent examining patient, answering all patient questions, arranging and discussing plan of care with patient as well as directly providing post-discharge instructions. Additional time then spent on discharge activities. Discharge Medications:  See after visit summary for reconciled discharge medications provided to patient and/or family.       **Please Note: This note may have been constructed using a voice recognition system**

## 2023-12-08 NOTE — DISCHARGE INSTR - AVS FIRST PAGE
Dear Lisa Mccall,     It was our pleasure to care for you here at [address] [place] [address]. It is our hope that we were always able to exceed the expected standards for your care during your stay. You were hospitalized due to acute respiratory failure. You were cared for on the 2nd floor under the service of Padma Woodard PA-C with the [facility] who covers for your primary care physician (PCP), Snehal Blum MD, while you were hospitalized. If you have any questions or concerns related to this hospitalization, you may contact us at [phone]. For follow up as well as medication refills, we recommend that you follow up with your primary care physician. A registered nurse will reach out to you by phone within a few days after your discharge to answer any additional questions that you may have after going home. However, at this time we provide for you here, the most important instructions / recommendations at discharge:     Notable Medication Adjustments -   Take prednisone 40mg daily fro 3 days, followed by 30mg daily for 3 days, followed by 20mg daily for 3 days, followed by 10mg daily for 3 days. After this taper is complete, please start your prednisone 5mg daily. Testing Required after Discharge -   Pulmonary Function Testing  Incidental findings that Require Follow Up   None  Important Follow Up Information -   Follow up with your PCP in 1-2 weeks. Follow up with pulmonology as an outpatient  Please review this entire after visit summary as additional general instructions including medication list, appointments, activity, diet, any pertinent wound care, and other additional recommendations from your care team that may be provided for you.       Sincerely,     Padma Woodard PA-C

## 2023-12-08 NOTE — ASSESSMENT & PLAN NOTE
Patient has a history of COPD, with multiple admissions for COPD exacerbations  Patient follows with [facility] pulmonology Dr. Gloria Kelly  Was admitted with COPD exacerbation  Continue steroids-->switched to oral prednisone today with taper of 10mg every 3 days  Cont budesonide, formoterol, nebulizers  Treated with azithromycin x 3 days for COPD exacerbation  Pt has chronic hypoxia-  pulm office notes indicate he I supposed to use 2L at rest and 4 w exertion but had been using "prn"  Pt needs outpt PFT rescheduled:  did not complete 9/23 appt

## 2023-12-08 NOTE — PHYSICAL THERAPY NOTE
PHYSICAL THERAPY EVALUATION          Patient Name: Leigha Gunter  HJETQ'I Date: 12/8/2023 12/08/23 0830   PT Last Visit   PT Visit Date 12/08/23   Note Type   Note type Evaluation   Pain Assessment   Pain Assessment Tool 0-10   Pain Score No Pain   Restrictions/Precautions   Other Precautions O2;Fall Risk   Home Living   Type of [address] [place] [address] Two level;Bed/bath upstairs   Bathroom Shower/Tub Walk-in shower   Bathroom Toilet Standard   Bathroom Equipment Shower chair   Home Equipment Walker;Cane;Wheelchair-manual;Other (Comment)  (2-3 L)   Additional Comments 0 SHYANNE. 13 steps upstairs   Prior Function   Level of [place] Independent with functional mobility; Needs assistance with ADLs; Needs assistance with IADLS   Lives With Spouse   Receives Help From Family  (son)   IADLs Family/Friend/Other provides transportation; Family/Friend/Other provides meals; Family/Friend/Other provides medication management   Comments per spouse, pt son helps pt w ADLs. ambulates without an AD. General   Additional Pertinent History pt admitted 12/5/23 for acute on chronic respiratory failure w hypoxia and hypercapnia. intubated 12/5, extubated 12/6. "early mobilization" orders. PMHx significant for CHF, chronic respiratory failure, CKD, COPD, pre DM   Cognition   Arousal/Participation Cooperative   Orientation Level Oriented to person;Oriented to place; Disoriented to time  (oriented to month only)   Following Commands Follows one step commands without difficulty   Comments [place], occ repetition needed   Bed Mobility   Supine to Sit 5  Supervision   Additional items Bedrails; Increased time required   Transfers   Sit to Stand 5  Supervision   Additional items Increased time required; Bedrails   Stand to Sit 5  Supervision   Additional items Armrests; Increased time required   Ambulation/Elevation   Gait pattern Improper Weight shift; Short stride   Gait Assistance 5  Supervision   Additional items Assist x 1 Assistive Device None   Distance 120'   Balance   Static Standing Fair   Dynamic Standing Fair -   Ambulatory Fair -   Endurance Deficit   Endurance Deficit No   Activity Tolerance   Activity Tolerance Patient tolerated treatment well   Medical Staff [address] OT   Nurse Made Aware yes   Assessment   Prognosis Good   Assessment Shyam Ballesteros is a 82 y.o. male admitted to [address] [place] [address] on 12/5/2023 for Acute on chronic respiratory failure with hypoxia and hypercapnia ([address] PT was consulted and pt was seen on 12/8/2023 for mobility assessment and d/c planning. Pt presents w high fall risk, 4L O2, language barrier. Social hx confirmed w spouse; reports pt ambulates without an AD at baseline. Pt is currently functioning at a supervision assistance x1 level for bed mobility, transfers and ambulation. Ambulating limited community distances without difficulty. On inc O2 compared to baseline; masimo not recording O2 sats however no respiratory sx observed or reported. Appears to be functioning near baseline but may benefit from continued mobilization via nsg/ restorative. The patient's AM-PAC Basic Mobility Inpatient Short Form Raw Score is 19. A Raw score of greater than 16 suggests the patient may benefit from discharge to home. Please also refer to the recommendation of the Physical Therapist for safe discharge planning.    Barriers to Discharge None   Plan   PT Frequency   (d/c PT: maintain on restorative)   Discharge Recommendation   Rehab Resource Intensity Level, PT No post-acute rehabilitation needs   AM-PAC Basic Mobility Inpatient   Turning in Flat Bed Without Bedrails 4   Lying on Back to Sitting on Edge of Flat Bed Without Bedrails 3   Moving Bed to Chair 3   Standing Up From Chair Using Arms 3   Walk in Room 3   Climb 3-5 Stairs With Railing 3   Basic Mobility Inpatient Raw Score 19   Basic Mobility Standardized Score 42.48   Highest Level Of Mobility   -HL Goal 6: Walk 10 steps or more   -HLM Achieved 7: Walk 25 feet or more   End of Consult   Patient Position at End of Consult Bedside chair; All needs within reach     History: co - morbidities including age, assist for adl's/iadl's, cognition, current experience including fall risk, multiple lines  Exam: impairments in systems including multiple body structures involved; neuromuscular (balance, gait, transfers), cognition; am-pac  Clinical: stable/unpredictable  Complexity: moderate    Alcon Barney, PT

## 2023-12-08 NOTE — CASE MANAGEMENT
Case Management Discharge Planning Note    Patient name  [facility]  Location [facility] [address] [facility] Leandra Fuentes* MRN [id number]  : 1935 Date 2023       Current Admission Date: 2023  Current Admission Diagnosis:Acute on chronic respiratory failure with hypoxia and hypercapnia [facility])   Patient Active Problem List    Diagnosis Date Noted    Macrocytosis 2023    Aspiration into airway 2023    Constipation 2023    Prediabetes 10/25/2023    Bilateral hearing loss 10/25/2023    PAC (premature atrial contraction) 2023    Hypoxemia 2023    Vitamin B12 deficiency 2023    Vitamin D deficiency 2023    Chronic kidney disease, stage 3a ([address]) 2023    Weight loss 2022    Acute on chronic respiratory failure with hypoxia and hypercapnia ([address]) 2022    Supplemental oxygen dependent 2022    Rectal bleeding 2021    Hyperlipidemia 2021    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    Dysphagia 2020    TIA (transient ischemic attack) 2020    S/P hernia repair 2019    Acquired renal cyst of left kidney 2019    Chronic diastolic CHF (congestive heart failure) ([address]) 2019    Thrombocytopenia ([address]) 2018    Varicose veins of both lower extremities with pain 2018    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HCC) 2018    Chronic respiratory failure with hypoxia ([address]) 2018    Descending aortic aneurysm ([address]) 2018    History of DVT (deep vein thrombosis) 2018    Chronic obstructive pulmonary disease with acute exacerbation ([address]) 2018    Benign essential hypertension 2017      LOS (days): 3  Geometric Mean LOS (GMLOS) (days): 5.20  Days to GMLOS:2.3     OBJECTIVE:  Risk of Unplanned Readmission Score: 23.86         Current admission status: Inpatient   Preferred Pharmacy:   [address] [place] [address], [facility] [address] [facility]  [address] [address] [address] [place] [address]  Phone: [phone] Fax: [address], [address] [place] [address] [address] [address]  Phone: [phone] Fax: [phone]    Primary Care Provider: Nicky Jeff MD    Primary Insurance: Piedad Hagan MEDICARE [facility] REP  Secondary Insurance: [facility]    DISCHARGE DETAILS:    Discharge planning discussed with[de-identified] Patient w/ Spouse at bedside  Freedom of Choice: Yes  Comments - Freedom of Choice: Pt will discharge home, resume non-skilled HHA services provided through waiver  CM contacted family/caregiver?: Yes (Spouse at bedside)  Were Treatment Team discharge recommendations reviewed with patient/caregiver?: Yes  Did patient/caregiver verbalize understanding of patient care needs?: Yes  Were patient/caregiver advised of the risks associated with not following Treatment Team discharge recommendations?: Yes    Contacts  Patient Contacts: Dee Dee Millan  [phone]  Relationship to Patient[de-identified] Family  Contact Method: Phone  Reason/Outcome: Emergency [address]         Is the patient interested in [address] at discharge?: No  DME Referral Provided  Referral made for DME?: Yes  DME referral completed for the following items[de-identified] BiPAP  DME Supplier Name[de-identified] [employer]    Treatment Team Recommendation: Home  Transport at Discharge : Automobile  Accompanied by: Family member    Additional Comments: CM met with Pt and spouse at bedside to review psycsocial information and discharge dcp. Pt is to discharge with a bipap, which CM will order prior to Pts discharge. Pts spouse expressed understanding. CM to order through [employer] as this DME company supplies Pts O2 order. NO other changes identified at this time. CM following. UPDATE: CM informed that Pt does not require Bi-Pap and will discharge home with his current home O2 orders. CM signing off.

## 2023-12-08 NOTE — PLAN OF CARE
Problem: Potential for Falls  Goal: Patient will remain free of falls  Description: INTERVENTIONS:  - Educate patient/family on patient safety including physical limitations  - Instruct patient to call for assistance with activity   - Consult OT/PT to assist with strengthening/mobility   - Keep Call bell within reach  - Keep bed low and locked with side rails adjusted as appropriate  - Keep care items and personal belongings within reach  - Initiate and maintain comfort rounds  - Make Fall Risk Sign visible to staff  - Apply yellow socks and bracelet for high fall risk patients  - Consider moving patient to room near nurses station  Outcome: Progressing     Problem: PAIN - ADULT  Goal: Verbalizes/displays adequate comfort level or baseline comfort level  Description: Interventions:  - Encourage patient to monitor pain and request assistance  - Assess pain using appropriate pain scale  - Administer analgesics based on type and severity of pain and evaluate response  - Implement non-pharmacological measures as appropriate and evaluate response  - Consider cultural and social influences on pain and pain management  - Notify physician/advanced practitioner if interventions unsuccessful or patient reports new pain  Outcome: Progressing     Problem: INFECTION - ADULT  Goal: Absence or prevention of progression during hospitalization  Description: INTERVENTIONS:  - Assess and monitor for signs and symptoms of infection  - Monitor lab/diagnostic results  - Monitor all insertion sites, i.e. indwelling lines, tubes, and drains  - Monitor endotracheal if appropriate and nasal secretions for changes in amount and color  - [place] appropriate cooling/warming therapies per order  - Administer medications as ordered  - Instruct and encourage patient and family to use good hand hygiene technique  - Identify and instruct in appropriate isolation precautions for identified infection/condition  Outcome: Progressing  Goal: Absence of fever/infection during neutropenic period  Description: INTERVENTIONS:  - Monitor WBC    Outcome: Progressing     Problem: SAFETY ADULT  Goal: Patient will remain free of falls  Description: INTERVENTIONS:  - Educate patient/family on patient safety including physical limitations  - Instruct patient to call for assistance with activity   - Consult OT/PT to assist with strengthening/mobility   - Keep Call bell within reach  - Keep bed low and locked with side rails adjusted as appropriate  - Keep care items and personal belongings within reach  - Initiate and maintain comfort rounds  - Make Fall Risk Sign visible to staff  - Apply yellow socks and bracelet for high fall risk patients  - Consider moving patient to room near nurses station  Outcome: Progressing  Goal: Maintain or return to baseline ADL function  Description: INTERVENTIONS:  -  Assess patient's ability to carry out ADLs; assess patient's baseline for ADL function and identify physical deficits which impact ability to perform ADLs (bathing, care of mouth/teeth, toileting, grooming, dressing, etc.)  - Assess/evaluate cause of self-care deficits   - Assess range of motion  - Assess patient's mobility; develop plan if impaired  - Assess patient's need for assistive devices and provide as appropriate  - Encourage maximum independence but intervene and supervise when necessary  - Involve family in performance of ADLs  - Assess for home care needs following discharge   - Consider OT consult to assist with ADL evaluation and planning for discharge  - Provide patient education as appropriate  Outcome: Progressing  Goal: Maintains/Returns to pre admission functional level  Description: INTERVENTIONS:  - Perform AM-PAC 6 Click Basic Mobility/ Daily Activity assessment daily.  - Set and communicate daily mobility goal to care team and patient/family/caregiver.    - Collaborate with rehabilitation services on mobility goals if consulted  - Out of bed for toileting  - Record patient progress and toleration of activity level   Outcome: Progressing     Problem: DISCHARGE PLANNING  Goal: Discharge to home or other facility with appropriate resources  Description: INTERVENTIONS:  - Identify barriers to discharge w/patient and caregiver  - Arrange for needed discharge resources and transportation as appropriate  - Identify discharge learning needs (meds, wound care, etc.)  - Arrange for interpretive services to assist at discharge as needed  - Refer to Case Management Department for coordinating discharge planning if the patient needs post-hospital services based on physician/advanced practitioner order or complex needs related to functional status, cognitive ability, or social support system  Outcome: Progressing     Problem: Knowledge Deficit  Goal: Patient/family/caregiver demonstrates understanding of disease process, treatment plan, medications, and discharge instructions  Description: Complete learning assessment and assess knowledge base.   Interventions:  - Provide teaching at level of understanding  - Provide teaching via preferred learning methods  Outcome: Progressing     Problem: RESPIRATORY - ADULT  Goal: Achieves optimal ventilation and oxygenation  Description: INTERVENTIONS:  - Assess for changes in respiratory status  - Assess for changes in mentation and behavior  - Position to facilitate oxygenation and minimize respiratory effort  - Oxygen administered by appropriate delivery if ordered  - Initiate smoking cessation education as indicated  - Encourage broncho-pulmonary hygiene including cough, deep breathe, Incentive Spirometry  - Assess the need for suctioning and aspirate as needed  - Assess and instruct to report SOB or any respiratory difficulty  - Respiratory Therapy support as indicated  Outcome: Progressing     Problem: Nutrition/Hydration-ADULT  Goal: Nutrient/Hydration intake appropriate for improving, restoring or maintaining nutritional needs  Description: Monitor and assess patient's nutrition/hydration status for malnutrition. Collaborate with interdisciplinary team and initiate plan and interventions as ordered. Monitor patient's weight and dietary intake as ordered or per policy. Utilize nutrition screening tool and intervene as necessary. Determine patient's food preferences and provide high-protein, high-caloric foods as appropriate.      INTERVENTIONS:  - Monitor oral intake, urinary output, labs, and treatment plans  - Assess nutrition and hydration status and recommend course of action  - Evaluate amount of meals eaten  - Assist patient with eating if necessary   - Allow adequate time for meals  - Recommend/ encourage appropriate diets, oral nutritional supplements, and vitamin/mineral supplements  - Order, calculate, and assess calorie counts as needed  - Recommend, monitor, and adjust tube feedings and TPN/PPN based on assessed needs  - Assess need for intravenous fluids  - Provide specific nutrition/hydration education as appropriate  - Include patient/family/caregiver in decisions related to nutrition  Outcome: Progressing     Problem: COPING  Goal: Pt/Family able to verbalize concerns and demonstrate effective coping strategies  Description: INTERVENTIONS:  - Assist patient/family to identify coping skills, available support systems and cultural and spiritual values  - Provide emotional support, including active listening and acknowledgement of concerns of patient and caregivers  - Reduce environmental stimuli, as able  - Provide patient education  - Assess for spiritual pain/suffering and initiate spiritual care, including notification of Pastoral Care or love based community as needed  - Assess effectiveness of coping strategies  Outcome: Progressing  Goal: Will report anxiety at manageable levels  Description: INTERVENTIONS:  - Administer medication as ordered  - Teach and encourage coping skills  - Provide emotional support  - Assess patient/family for anxiety and ability to cope  Outcome: Progressing     Problem: Prexisting or High Potential for Compromised Skin Integrity  Goal: Skin integrity is maintained or improved  Description: INTERVENTIONS:  - Identify patients at risk for skin breakdown  - Assess and monitor skin integrity  - Assess and monitor nutrition and hydration status  - Monitor labs   - Assess for incontinence   - Turn and reposition patient  - Assist with mobility/ambulation  - Relieve pressure over bony prominences  - Avoid friction and shearing  - Provide appropriate hygiene as needed including keeping skin clean and dry  - Evaluate need for skin moisturizer/barrier cream  - Collaborate with interdisciplinary team   - Patient/family teaching  - Consider wound care consult   Outcome: Progressing

## 2023-12-08 NOTE — ASSESSMENT & PLAN NOTE
Patient presented with uncontrolled hypertension with a systolic blood pressure greater than 200  Continue home Cozaar 50 mg twice daily: Was increased recently by PCP

## 2023-12-08 NOTE — OCCUPATIONAL THERAPY NOTE
Occupational Therapy Evaluation     Patient Name: Alexandra Gardiner  QNZAA'L Date: 12/8/2023  Problem List  Principal Problem:    Acute on chronic respiratory failure with hypoxia and hypercapnia (HCC)  Active Problems:    History of DVT (deep vein thrombosis)    Benign essential hypertension    Thrombocytopenia (HCC)    Chronic diastolic CHF (congestive heart failure) (HCC)    Dysphagia    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)    Chronic kidney disease, stage 3a (HCC)    Chronic obstructive pulmonary disease with acute exacerbation (HCC)    Aspiration into airway    Macrocytosis    Past Medical History  Past Medical History:   Diagnosis Date    Acute metabolic encephalopathy 15/39/0545    Acute on chronic respiratory failure ([address]) 12/4/2022    Age-related cataract of right eye 4/4/2023    patient is medically cleared and presents with low risk of complication with this upcoming R cataract surgery.     Anemia     Aneurysm, aorta, thoracic (HCC)     Appendicolith     Ascending aortic aneurysm (HCC)     3.7    Asthma     BPH (benign prostatic hyperplasia)     CAD (coronary artery disease)     noted on CT scan    CHF (congestive heart failure) (HCC)     COPD (chronic obstructive pulmonary disease) ([address])     Descending thoracic aortic aneurysm ([address])     Diabetes mellitus ([address])     Diverticulosis     Former tobacco use     GERD (gastroesophageal reflux disease)     History of DVT (deep vein thrombosis)     Left leg    History of transfusion     Hypertension     Inguinal hernia     right    Nephrolithiasis     Oxygen dependent     2LNC    Oxygen dependent     Pneumonia     Pre-diabetes     Prostate calculus     PVD (peripheral vascular disease) ([address])     Recurrent right inguinal hernia w incarcertion 5/7/0212    Small bowel obstruction ([address]) 12/28/2022    Thoracic aortic aneurysm without rupture ([address]) 11/19/2018    Added automatically from request for surgery [id number]    Thrombocytopenia ([address]) 12/29/2018    Ulcer     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)     Varicose vein of leg     b/l     Past Surgical History  Past Surgical History:   Procedure Laterality Date    CARDIAC SURGERY      ESOPHAGOGASTRODUODENOSCOPY      HERNIA REPAIR Right 1/21/2019    Procedure: REPAIR HERNIA INGUINAL WITH MESH;  Surgeon: Matt Freeman MD;  Location: [address] [place] [address] MAIN OR;  Service: General    HERNIA REPAIR Right 7/22/2023    Procedure: REPAIR RECURRENT INCARERATED HERNIA INGUINAL OPEN, RIGHT ORCHIECTOMY;  Surgeon: Jose Raul Lomas MD;  Location: [facility] Main [facility];  Service: General    INGUINAL HERNIA REPAIR Bilateral     IR TEVAR  12/27/2018    AK EVASC RPR DTA COVERAGE ART ORIGIN 1ST ENDOPROSTH N/A 12/27/2018    Procedure: TEVAR - endovascular thoracic aortic aneurysm repair;  Surgeon: Jose Antonio Davis MD;  Location:  MAIN OR;  Service: Vascular    THORACIC AORTIC ANEURYSM REPAIR  12/27/2018    VARICOSE VEIN SURGERY Bilateral     vein stripping         12/08/23 0919   OT Last Visit   OT Visit Date 12/08/23   Note Type   Note type Evaluation   Additional Comments greeted in supine and agreeable to skilled OT evaluation. Wife at bedside. Pain Assessment   Pain Assessment Tool 0-10   Pain Score No Pain   Restrictions/Precautions   Weight Bearing Precautions Per Order No   Other Precautions Fall Risk;O2;Multiple lines  (Danish Speaking.)   Home Living   Type of Home House   Home Layout Two level;Bed/bath upstairs   Bathroom Shower/Tub Walk-in shower   Bathroom Toilet Standard   Home Equipment Walker;Cane;Wheelchair-manual  (home O2)   Prior Function   Level of [place] Independent with functional mobility; Needs assistance with ADLs; Needs assistance with IADLS   Lives With Spouse   Receives Help From Family   IADLs Family/Friend/Other provides transportation; Family/Friend/Other provides meals; Family/Friend/Other provides medication management   Falls in the last 6 months 0   Comments Prior to admission, pt lives with wife in a 2 level house with 0 SHYANNE. 1 FOS to bedroom and bathroom. Walk in shower with seat, standard toilets. I with mobility with no AD. A for ADLS and IADLs. A from son for ADLS. On 2-3 L Home O2. ADL   Where Assessed Edge of bed   Eating Assistance 6  Modified independent   Grooming Assistance 5  Supervision/Setup   UB Bathing Assistance 5  Supervision/Setup   LB Bathing Assistance 5  Supervision/Setup   UB Dressing Assistance 5  Supervision/Setup   LB Dressing Assistance 4  Minimal [address] [place]  5  Supervision/Setup   Bed Mobility   Supine to Sit 5  Supervision   Additional items HOB elevated; Bedrails   Additional Comments left seated up in chair following session. call light in reach. Transfers   Sit to Stand 5  Supervision   Additional items Increased time required;Verbal cues   Stand to Sit 5  Supervision   Additional items Increased time required;Verbal cues   Additional Comments cues for pacing. Functional Mobility   Functional Mobility 5  Supervision   Additional Comments no device. household distances.  A to manage O2 line   Balance   Static Sitting Fair +   Dynamic Sitting Fair   Static Standing Fair   Dynamic Standing Fair -   Ambulatory Fair -   Activity Tolerance   Activity Tolerance Patient tolerated treatment well   Medical Staff Made Aware MENDOZA Del Valle   Nurse Made Aware SCHUYLER MILLS Assessment   RUE Assessment WFL   LUE Assessment   LUE Assessment WFL   Hand Function   Gross Motor Coordination Functional   Fine Motor Coordination Functional   Vision-Basic Assessment   Current Vision Wears glasses all the time   Psychosocial   Psychosocial (WDL) WDL   Cognition   Overall Cognitive Status Impaired   Arousal/Participation Cooperative   Attention Attends with cues to redirect   Orientation Level Oriented to person;Oriented to place  (Oriented to month only)   Following Commands Follows one step commands without difficulty   Comments Hard of Hearing   Assessment   Assessment Kya Cifuentes is a 82 y.o. male seen for OT evaluation s/p admit to [facility] on [facility]2023 w/ Acute on chronic respiratory failure with hypoxia and hypercapnia ([address] Comorbidities affecting pt's functional performance at time of assessment include:  CHF, chronic respiratory failure, CKD, COPD, pre DM . Pt with active OT orders and activity orders for Ambulate. Personal factors affecting pt at time of IE include:steps within home environment, difficulty performing ADLs, difficulty performing IADLs, and difficulty performing transfers/mobility. Prior to admission, pt lives with wife in a 2 level house with 0 SHYANNE. 1 FOS to bedroom and bathroom. Walk in shower with seat, standard toilets. I with mobility with no AD. A for ADLS and IADLs. A from son for ADLS. On 2-3 L Home O2. Upon evaluation: Pt currently requires supervision for UB ADLs, [place] for LB ADLs, supervision for toileting, supervision for bed mobility, supervision for functional transfers, and supervision mobility 2* the following deficits impacting occupational performance:weakness. VITALS during session: on 3L O2- sats stable. Pt is currently at their functional baseline and no skilled OT is warranted at this time. From OT standpoint, recommendation at time of d/c would be no rehab needs.    Plan   OT Frequency Eval only  (DC OT, maintain on restorative.)   Discharge Recommendation   Rehab Resource Intensity Level, OT No post-acute rehabilitation needs   AM-PAC Daily Activity Inpatient   Lower Body Dressing 3   Bathing 3   Toileting 3   Upper Body Dressing 4   Grooming 4   Eating 4   Daily Activity Raw Score 21   Daily Activity Standardized Score (Calc for Raw Score >=11) 44.27   AM-PAC Applied Cognition Inpatient   Following a Speech/Presentation 4   Understanding Ordinary Conversation 4   Taking Medications 2   Remembering Where Things Are Placed or Put Away 3   Remembering List of 4-5 Errands 2   Taking Care of Complicated Tasks 2   Applied Cognition Raw Score 17   Applied Cognition Standardized Score 36.52   Maria Luisa Alvarado, OT

## 2023-12-08 NOTE — ASSESSMENT & PLAN NOTE
Patient presented with evidence of debris in airway and probable aspiration  In the setting of vomiting  Speech consulted-regular diet with thins

## 2023-12-08 NOTE — ASSESSMENT & PLAN NOTE
Lab Results   Component Value Date    EGFR 77 12/08/2023    EGFR 70 12/07/2023    EGFR 63 12/06/2023    CREATININE 0.89 12/08/2023    CREATININE 0.97 12/07/2023    CREATININE 1.06 12/06/2023   Baseline creatinine ranges 0.8-0.9  Creatinine approximately at baseline

## 2023-12-08 NOTE — ASSESSMENT & PLAN NOTE
Patient was admitted with acute hypoxic/hypercapnic respiratory failure with vomiting, requiring intubation in the ICU  Admission for concerns for possible pneumonia  CAT scan revealed "secretions with near occlusion of the bronchus intermedius, peribronchial infiltrate in the right middle lobe and right lower lobe.   Findings could reflect aspiration "  Procalcitonin was negative x 2, patient was not felt to have a bacterial pneumonia and antibiotics were not indicated, apart from azithromycin x 3 days for COPD exacerbation  Underwent bronchoscopy 12/6: No significant secretions, obstruction and normal-appearing airways  Continue aspiration precautions

## 2023-12-08 NOTE — NURSING NOTE
Previous nurse reviewed discharged instructions with patient and spouse. Discussed the importance of follow up care.

## 2023-12-08 NOTE — ASSESSMENT & PLAN NOTE
Wt Readings from Last 3 Encounters:   12/08/23 61.4 kg (135 lb 5.8 oz)   11/21/23 61.2 kg (135 lb)   11/15/23 65.4 kg (144 lb 2.9 oz)     Most recent 2D echocardiogram 4/23: Left ventricular ejection fraction normal.  Grade 1 diastolic dysfunction.   Mild TR  Patient is not on maintenance diuretics  Continue ARB

## 2023-12-11 ENCOUNTER — TELEPHONE (OUTPATIENT)
Age: 88
End: 2023-12-11

## 2023-12-11 ENCOUNTER — TRANSITIONAL CARE MANAGEMENT (OUTPATIENT)
Dept: FAMILY MEDICINE CLINIC | Facility: CLINIC | Age: 88
End: 2023-12-11

## 2023-12-11 DIAGNOSIS — Z71.89 COMPLEX CARE COORDINATION: Primary | ICD-10-CM

## 2023-12-11 NOTE — UTILIZATION REVIEW
NOTIFICATION OF ADMISSION DISCHARGE   This is a Notification of Discharge from [address]. Please be advised that this patient has been discharge from our facility. Below you will find the admission and discharge date and time including the patient’s disposition. UTILIZATION REVIEW CONTACT:  Alicia Hopper  Utilization   Network Utilization Review Department  Phone: [phone] x carefully listen to the prompts. All voicemails are confidential.  Email: [email or web address]     ADMISSION INFORMATION  PRESENTATION DATE: 12/5/2023  1:48 PM  OBERVATION ADMISSION DATE:   INPATIENT ADMISSION DATE: 12/5/23  5:00 PM   DISCHARGE DATE: 12/8/2023  3:38 PM   DISPOSITION:Home/Self Care    Network Utilization Review Department  ATTENTION: Please call with any questions or concerns to [phone] and carefully listen to the prompts so that you are directed to the right person. All voicemails are confidential.   For Discharge needs, contact Care Management DC Support Team at [phone] opt. 2  Send all requests for admission clinical reviews, approved or denied determinations and any other requests to dedicated fax number below belonging to the campus where the patient is receiving treatment.  List of dedicated fax numbers for the Facilities:  [place] DENIALS (Administrative/Medical Necessity) [phone]   DISCHARGE SUPPORT TEAM (Network) [phone] [address] (Maternity/NICU/Pediatrics) [phone]   [address] [facility] [address] [place] [address] [id number] [place] [address] [phone]   [facility] [address] [facility] [phone]   [address] [place] [address]  [address]  [phone]

## 2023-12-12 ENCOUNTER — PATIENT OUTREACH (OUTPATIENT)
Dept: FAMILY MEDICINE CLINIC | Facility: CLINIC | Age: 88
End: 2023-12-12

## 2023-12-13 ENCOUNTER — TELEPHONE (OUTPATIENT)
Dept: FAMILY MEDICINE CLINIC | Facility: CLINIC | Age: 88
End: 2023-12-13

## 2023-12-14 ENCOUNTER — OFFICE VISIT (OUTPATIENT)
Dept: FAMILY MEDICINE CLINIC | Facility: CLINIC | Age: 88
End: 2023-12-14
Payer: MEDICARE

## 2023-12-14 VITALS
WEIGHT: 137 LBS | RESPIRATION RATE: 20 BRPM | SYSTOLIC BLOOD PRESSURE: 130 MMHG | HEART RATE: 72 BPM | TEMPERATURE: 98 F | DIASTOLIC BLOOD PRESSURE: 80 MMHG | OXYGEN SATURATION: 95 % | BODY MASS INDEX: 24.27 KG/M2 | HEIGHT: 63 IN

## 2023-12-14 DIAGNOSIS — J96.21 ACUTE ON CHRONIC RESPIRATORY FAILURE WITH HYPOXIA AND HYPERCAPNIA (HCC): ICD-10-CM

## 2023-12-14 DIAGNOSIS — J96.22 ACUTE ON CHRONIC RESPIRATORY FAILURE WITH HYPOXIA AND HYPERCAPNIA (HCC): ICD-10-CM

## 2023-12-14 DIAGNOSIS — R79.89 ABNORMAL TSH: ICD-10-CM

## 2023-12-14 DIAGNOSIS — D75.89 MACROCYTOSIS: Primary | ICD-10-CM

## 2023-12-14 DIAGNOSIS — E03.9 HYPOTHYROIDISM (ACQUIRED): ICD-10-CM

## 2023-12-14 PROCEDURE — 99214 OFFICE O/P EST MOD 30 MIN: CPT | Performed by: FAMILY MEDICINE

## 2023-12-14 NOTE — PROGRESS NOTES
TCM Call       Date and time call was made  12/11/2023 11:35 AM    Hospital care reviewed  Records reviewed    Patient was hospitialized at  [facility] [place] - CLOSED    Date of Admission  12/05/23    Date of discharge  12/08/23    Diagnosis  Chronic obstructive pulmonary disease with acute exacerbation    Disposition  Home    Current Symptoms  None          TCM Call       Post hospital issues  None    Should patient be enrolled in anticoag monitoring? No    Scheduled for follow up? Yes    Patients specialists  [facility]    Did you obtain your prescribed medications  Yes    Do you need help managing your prescriptions or medications  No    Is transportation to your appointment needed  No    I have advised the patient to call PCP with any new or worsening symptoms  Yareli ARGUELLO    Living Arrangements  Family members    Have you fallen in the last 12 months  No    Interperter language line needed  No           Chief Complaint  Transitional care management post-hospitalization for acute on chronic respiratory failure. History of Present Illness  82year-old male with a recent hospitalization from December 5 to December 8, 2023, for acute on chronic respiratory failure with hypoxia and hypercapnia. Patient was managed in the ICU, required BiPAP, and was intubated due to severe hypoxia. Post-extubation, he is on 4 L of nasal cannula oxygen. Recent weight loss noted, and patient has a history of chronic diastolic congestive heart failure.     Medications    Current Outpatient Medications:     albuterol (2.5 mg/3 mL) 0.083 % nebulizer solution, TAKE 3 ML (2.5 MG TOTAL) BY NEBULIZATION EVERY 6 (SIX) HOURS AS NEEDED FOR WHEEZING OR SHORTNESS OF BREATH, Disp: 75 mL, Rfl: 3    albuterol (PROVENTIL HFA,VENTOLIN HFA) 90 mcg/act inhaler, INHALE 2 PUFFS EVERY 6 (SIX) HOURS AS NEEDED FOR WHEEZING, Disp: 18 g, Rfl: 5    budesonide (Pulmicort) 0.5 mg/2 mL nebulizer solution, Take 2 mL (0.5 mg total) by nebulization 2 (two) times a day Rinse mouth after use., Disp: 120 mL, Rfl: 5    formoterol (PERFOROMIST) 20 MCG/2ML nebulizer solution, Take 2 mL (20 mcg total) by nebulization 2 (two) times a day, Disp: 120 mL, Rfl: 30    losartan (COZAAR) 25 mg tablet, Take 2 tablets (50 mg total) by mouth 2 (two) times a day, Disp: 180 tablet, Rfl: 1    pantoprazole (PROTONIX) 40 mg tablet, TAKE 1 TABLET (40 MG TOTAL) BY MOUTH DAILY (Patient not taking: Reported on 12/5/2023), Disp: 30 tablet, Rfl: 5    predniSONE 10 mg tablet, Take 4 tablets (40 mg total) by mouth daily for 3 days, THEN 3 tablets (30 mg total) daily for 3 days, THEN 2 tablets (20 mg total) daily for 3 days, THEN 1 tablet (10 mg total) daily for 3 days. Do not start before December 9, 2023., Disp: 30 tablet, Rfl: 0    [START ON 12/21/2023] predniSONE 5 mg tablet, Take 1 tablet (5 mg total) by mouth daily Do not start before December 21, 2023., Disp: 30 tablet, Rfl: 0      Allergies   Allergen Reactions    Penicillins Hives, Itching and Rash    Lisinopril Rash     Side pains and rash     Zyprexa [Olanzapine] Tongue Swelling         Past Medical History  Chronic diastolic congestive heart failure, liver disease, microcytosis, history of repaired aortic aneurysm. Past Surgical History  Aortic aneurysm repair. Review of Systems  Patient reports general well-being post-discharge, no new complaints. Vital Signs  /80 (BP Location: Left arm, Patient Position: Sitting, Cuff Size: Standard)   Pulse 72   Temp 98 °F (36.7 °C) (Tympanic)   Resp 20   Ht 5' 3" (1.6 m)   Wt 62.1 kg (137 lb)   SpO2 95% Comment: pt wearing oxygen 3 liters  BMI 24.27 kg/m²       Physical Exam  Auscultation reveals clear breath sounds anteriorly, diminished posteriorly; no peripheral edema or cardiac murmurs noted. Lab Results  Pending TSH, repeat ABGs, and complete blood count for microcytosis. Imaging and other Relevant Results  Last GFR was 77 on December 8, 2023.  Surveillance imaging for aortic aneurysm repair is ongoing. Assessment and Plan  1. Respiratory Failure: Continue oxygen therapy at 4 L/min via nasal cannula. Monitor oxygen saturation and ABGs closely. Schedule pulmonary function tests as an outpatient to assess lung function. 2. Chronic Diastolic CHF: Monitor weight daily. If weight increases by two pounds, initiate diuretic therapy. Patient to report weekly weights to the clinic. 3. Microcytosis: Pending further lab work to check TSH levels. Continue to monitor CBC and consider further workup if anemia persists. 4. Aortic Aneurysm: Continue outpatient surveillance as per vascular surgery's recommendations. 5. Aspiration Risk: Educate patient on aspiration precautions due to previous findings in the airway. Consider speech therapy evaluation if swallowing difficulties are reported. 6. Nutrition: Address recent weight loss and ensure adequate caloric intake while being mindful of fluid restrictions for CHF management. BMI Counseling: Body mass index is 24.27 kg/m². The BMI is above normal. Nutrition recommendations include reducing portion sizes.

## 2023-12-19 ENCOUNTER — PATIENT OUTREACH (OUTPATIENT)
Dept: FAMILY MEDICINE CLINIC | Facility: CLINIC | Age: 88
End: 2023-12-19

## 2023-12-19 NOTE — LETTER
Fecha: 12/19/23    Estimado/a Severiano Feliciano:      Mi nombre es Bridget Love un enfermero registrado administrador de atención de [facility] [address]  No pude comunicarme con usted y me gustaría programar un horario para que hablemos por teléfono o personalmente.  Me dedico a ayudar a los pacientes que tienen afecciones médicas complejas a obtener la atención que necesitan. Newton Hamilton comprende a pacientes que pueden estar en el hospital o en sj cristian de emergencias.  Adjunto información para usted. Si tiene preguntas, no dude en llamarme. Espero rosa llamado.  Atentamente.  Bridget Shafer RN  [phone]  Gerente de atención ambulatoria

## 2023-12-27 ENCOUNTER — APPOINTMENT (EMERGENCY)
Dept: CT IMAGING | Facility: HOSPITAL | Age: 88
End: 2023-12-27
Payer: MEDICARE

## 2023-12-27 ENCOUNTER — HOSPITAL ENCOUNTER (EMERGENCY)
Facility: HOSPITAL | Age: 88
Discharge: HOME | End: 2023-12-27
Attending: EMERGENCY MEDICINE
Payer: MEDICARE

## 2023-12-27 VITALS
BODY MASS INDEX: 25.14 KG/M2 | TEMPERATURE: 98.6 F | RESPIRATION RATE: 24 BRPM | WEIGHT: 141.9 LBS | HEART RATE: 100 BPM | SYSTOLIC BLOOD PRESSURE: 168 MMHG | DIASTOLIC BLOOD PRESSURE: 87 MMHG | OXYGEN SATURATION: 92 %

## 2023-12-27 DIAGNOSIS — J44.1 COPD EXACERBATION (HCC): Primary | ICD-10-CM

## 2023-12-27 DIAGNOSIS — H61.20 CERUMEN IMPACTION: ICD-10-CM

## 2023-12-27 LAB
2HR DELTA HS TROPONIN: 0 NG/L
ALBUMIN SERPL BCP-MCNC: 4.1 G/DL (ref 3.5–5)
ALP SERPL-CCNC: 73 U/L (ref 34–104)
ALT SERPL W P-5'-P-CCNC: 21 U/L (ref 7–52)
ANION GAP SERPL CALCULATED.3IONS-SCNC: 5 MMOL/L
AST SERPL W P-5'-P-CCNC: 23 U/L (ref 13–39)
ATRIAL RATE: 81 BPM
BASE EX.OXY STD BLDV CALC-SCNC: 56.6 % (ref 60–80)
BASE EXCESS BLDV CALC-SCNC: 15.4 MMOL/L
BASOPHILS # BLD AUTO: 0.02 THOUSANDS/ÂΜL (ref 0–0.1)
BASOPHILS NFR BLD AUTO: 0 % (ref 0–1)
BILIRUB SERPL-MCNC: 0.62 MG/DL (ref 0.2–1)
BNP SERPL-MCNC: 37 PG/ML (ref 0–100)
BUN SERPL-MCNC: 27 MG/DL (ref 5–25)
CALCIUM SERPL-MCNC: 10 MG/DL (ref 8.4–10.2)
CARDIAC TROPONIN I PNL SERPL HS: 14 NG/L
CARDIAC TROPONIN I PNL SERPL HS: 14 NG/L
CHLORIDE SERPL-SCNC: 94 MMOL/L (ref 96–108)
CO2 SERPL-SCNC: 45 MMOL/L (ref 21–32)
CREAT SERPL-MCNC: 0.87 MG/DL (ref 0.6–1.3)
EOSINOPHIL # BLD AUTO: 0.51 THOUSAND/ÂΜL (ref 0–0.61)
EOSINOPHIL NFR BLD AUTO: 7 % (ref 0–6)
ERYTHROCYTE [DISTWIDTH] IN BLOOD BY AUTOMATED COUNT: 12.9 % (ref 11.6–15.1)
FLUAV RNA RESP QL NAA+PROBE: NEGATIVE
FLUBV RNA RESP QL NAA+PROBE: NEGATIVE
GFR SERPL CREATININE-BSD FRML MDRD: 78 ML/MIN/1.73SQ M
GLUCOSE SERPL-MCNC: 123 MG/DL (ref 65–140)
GLUCOSE SERPL-MCNC: 125 MG/DL (ref 65–140)
HCO3 BLDV-SCNC: 46.8 MMOL/L (ref 24–30)
HCT VFR BLD AUTO: 45.5 % (ref 36.5–49.3)
HGB BLD-MCNC: 13.1 G/DL (ref 12–17)
IMM GRANULOCYTES # BLD AUTO: 0.02 THOUSAND/UL (ref 0–0.2)
IMM GRANULOCYTES NFR BLD AUTO: 0 % (ref 0–2)
LACTATE SERPL-SCNC: 1 MMOL/L (ref 0.5–2)
LYMPHOCYTES # BLD AUTO: 0.97 THOUSANDS/ÂΜL (ref 0.6–4.47)
LYMPHOCYTES NFR BLD AUTO: 14 % (ref 14–44)
MCH RBC QN AUTO: 31.4 PG (ref 26.8–34.3)
MCHC RBC AUTO-ENTMCNC: 28.8 G/DL (ref 31.4–37.4)
MCV RBC AUTO: 109 FL (ref 82–98)
MONOCYTES # BLD AUTO: 0.9 THOUSAND/ÂΜL (ref 0.17–1.22)
MONOCYTES NFR BLD AUTO: 13 % (ref 4–12)
NEUTROPHILS # BLD AUTO: 4.44 THOUSANDS/ÂΜL (ref 1.85–7.62)
NEUTS SEG NFR BLD AUTO: 66 % (ref 43–75)
NRBC BLD AUTO-RTO: 0 /100 WBCS
O2 CT BLDV-SCNC: 11.4 ML/DL
P AXIS: 56 DEGREES
PCO2 BLDV: 96.7 MM HG (ref 42–50)
PH BLDV: 7.3 [PH] (ref 7.3–7.4)
PLATELET # BLD AUTO: 95 THOUSANDS/UL (ref 149–390)
PMV BLD AUTO: 9.5 FL (ref 8.9–12.7)
PO2 BLDV: 31.2 MM HG (ref 35–45)
POTASSIUM SERPL-SCNC: 4.4 MMOL/L (ref 3.5–5.3)
PR INTERVAL: 140 MS
PROT SERPL-MCNC: 7 G/DL (ref 6.4–8.4)
QRS AXIS: 23 DEGREES
QRSD INTERVAL: 80 MS
QT INTERVAL: 376 MS
QTC INTERVAL: 436 MS
RBC # BLD AUTO: 4.17 MILLION/UL (ref 3.88–5.62)
RSV RNA RESP QL NAA+PROBE: NEGATIVE
SARS-COV-2 RNA RESP QL NAA+PROBE: NEGATIVE
SODIUM SERPL-SCNC: 144 MMOL/L (ref 135–147)
T WAVE AXIS: 41 DEGREES
TSH SERPL DL<=0.05 MIU/L-ACNC: 0.19 UIU/ML (ref 0.45–4.5)
VENTRICULAR RATE: 81 BPM
WBC # BLD AUTO: 6.86 THOUSAND/UL (ref 4.31–10.16)

## 2023-12-27 PROCEDURE — 82805 BLOOD GASES W/O2 SATURATION: CPT | Performed by: EMERGENCY MEDICINE

## 2023-12-27 PROCEDURE — 84484 ASSAY OF TROPONIN QUANT: CPT | Performed by: EMERGENCY MEDICINE

## 2023-12-27 PROCEDURE — 94644 CONT INHLJ TX 1ST HOUR: CPT

## 2023-12-27 PROCEDURE — 84439 ASSAY OF FREE THYROXINE: CPT | Performed by: EMERGENCY MEDICINE

## 2023-12-27 PROCEDURE — 93005 ELECTROCARDIOGRAM TRACING: CPT

## 2023-12-27 PROCEDURE — 80053 COMPREHEN METABOLIC PANEL: CPT | Performed by: EMERGENCY MEDICINE

## 2023-12-27 PROCEDURE — 96374 THER/PROPH/DIAG INJ IV PUSH: CPT

## 2023-12-27 PROCEDURE — 94760 N-INVAS EAR/PLS OXIMETRY 1: CPT

## 2023-12-27 PROCEDURE — 82948 REAGENT STRIP/BLOOD GLUCOSE: CPT

## 2023-12-27 PROCEDURE — 83880 ASSAY OF NATRIURETIC PEPTIDE: CPT | Performed by: EMERGENCY MEDICINE

## 2023-12-27 PROCEDURE — 84443 ASSAY THYROID STIM HORMONE: CPT | Performed by: EMERGENCY MEDICINE

## 2023-12-27 PROCEDURE — 94664 DEMO&/EVAL PT USE INHALER: CPT

## 2023-12-27 PROCEDURE — 99285 EMERGENCY DEPT VISIT HI MDM: CPT

## 2023-12-27 PROCEDURE — 94640 AIRWAY INHALATION TREATMENT: CPT

## 2023-12-27 PROCEDURE — 87040 BLOOD CULTURE FOR BACTERIA: CPT | Performed by: EMERGENCY MEDICINE

## 2023-12-27 PROCEDURE — 36415 COLL VENOUS BLD VENIPUNCTURE: CPT | Performed by: EMERGENCY MEDICINE

## 2023-12-27 PROCEDURE — 71275 CT ANGIOGRAPHY CHEST: CPT

## 2023-12-27 PROCEDURE — 0241U HB NFCT DS VIR RESP RNA 4 TRGT: CPT | Performed by: EMERGENCY MEDICINE

## 2023-12-27 PROCEDURE — 99285 EMERGENCY DEPT VISIT HI MDM: CPT | Performed by: EMERGENCY MEDICINE

## 2023-12-27 PROCEDURE — 85025 COMPLETE CBC W/AUTO DIFF WBC: CPT | Performed by: EMERGENCY MEDICINE

## 2023-12-27 PROCEDURE — 83605 ASSAY OF LACTIC ACID: CPT | Performed by: EMERGENCY MEDICINE

## 2023-12-27 RX ORDER — METHYLPREDNISOLONE SODIUM SUCCINATE 125 MG/2ML
125 INJECTION, POWDER, LYOPHILIZED, FOR SOLUTION INTRAMUSCULAR; INTRAVENOUS ONCE
Status: COMPLETED | OUTPATIENT
Start: 2023-12-27 | End: 2023-12-27

## 2023-12-27 RX ORDER — SODIUM CHLORIDE FOR INHALATION 0.9 %
12 VIAL, NEBULIZER (ML) INHALATION ONCE
Status: COMPLETED | OUTPATIENT
Start: 2023-12-27 | End: 2023-12-27

## 2023-12-27 RX ORDER — PREDNISONE 50 MG/1
50 TABLET ORAL DAILY
Qty: 4 TABLET | Refills: 0 | Status: SHIPPED | OUTPATIENT
Start: 2023-12-27 | End: 2023-12-31

## 2023-12-27 RX ADMIN — ALBUTEROL SULFATE 10 MG: 2.5 SOLUTION RESPIRATORY (INHALATION) at 20:15

## 2023-12-27 RX ADMIN — IPRATROPIUM BROMIDE 1 MG: 0.5 SOLUTION RESPIRATORY (INHALATION) at 20:15

## 2023-12-27 RX ADMIN — METHYLPREDNISOLONE SODIUM SUCCINATE 125 MG: 125 INJECTION, POWDER, FOR SOLUTION INTRAMUSCULAR; INTRAVENOUS at 20:08

## 2023-12-27 RX ADMIN — Medication 12 ML: at 20:16

## 2023-12-27 RX ADMIN — IOHEXOL 85 ML: 350 INJECTION, SOLUTION INTRAVENOUS at 22:31

## 2023-12-27 NOTE — Clinical Note
Severiano Feliciano was seen and treated in our emergency department on 12/27/2023.                Diagnosis:     Severiano  .    He may return on this date:     Por favor dejar de dormir con gatos en rosa cama     If you have any questions or concerns, please don't hesitate to call.      Gisel Carl MD    ______________________________           _______________          _______________  Hospital Representative                              Date                                Time

## 2023-12-28 ENCOUNTER — TELEPHONE (OUTPATIENT)
Dept: FAMILY MEDICINE CLINIC | Facility: CLINIC | Age: 88
End: 2023-12-28

## 2023-12-28 LAB — T4 FREE SERPL-MCNC: 0.9 NG/DL (ref 0.61–1.12)

## 2023-12-28 NOTE — ED CARE HANDOFF
Emergency Department Sign Out Note        Sign out and transfer of care from Dr. Patton. See Separate Emergency Department note.     The patient, Severiano Feliciano, was evaluated by the previous provider for SOB.    Workup Completed:  Pending labs and imaging    ED Course / Workup Pending (followup):    Patient signed out to me pending imaging and lab results. Imaging still pending, transferred care to Dr. Carl.                                ED Course as of 12/28/23 0104   Wed Dec 27, 2023   1954 4L NC at baseline, decreasing alertness, but arousable. Getting HAART neb, VBG, hx of hypercarbic respiratory failure, recent admission to [place]. PE CT pending.   2012 Procedure Note: EKG  Date/Time: 12/27/23 8:13 PM   Performed by: Misha Greene  Authorized by: Misha Greene  ECG interpreted by me, the ED Provider: yes   The EKG demonstrates:  Rate 81  Rhythm sinus  QTc 436  No ST elevations/depressions         Procedures  Medical Decision Making  Amount and/or Complexity of Data Reviewed  Labs: ordered.  Radiology: ordered.    Risk  OTC drugs.  Prescription drug management.            Disposition  Final diagnoses:   None     ED Disposition       None          Follow-up Information    None       Patient's Medications   Discharge Prescriptions    No medications on file     No discharge procedures on file.       ED Provider  Electronically Signed by     Anderson Greene DO  12/28/23 0104

## 2023-12-28 NOTE — ED NOTES
Patient awake and alert - speaks Lithuanian - according to family - he is oriented and not confused, but had to encourage patient to be able to recheck his vital signs. Breath sounds noted to be clear sounding on the right side and diminished on the left but saturating adequately with nasal cannula at 3-4 l. Respiratory was called to place Bi pap as ordered.     Cat Cunningham RN  12/27/23 [id number]

## 2023-12-28 NOTE — TELEPHONE ENCOUNTER
Adult nebulizer mask sent DME order - will be arriving to patient's home once approved by insurance

## 2023-12-28 NOTE — ED PROVIDER NOTES
History  Chief Complaint   Patient presents with   • Cough     Pt states he was recently admitted for respiratory failure, has been having a productive cough with clear sputum. Uses PRN oxygen at home, on arrival is using 4LO2     Patient is an 88-year-old male.  He lives at home.  He has a history of recent hospitalizations for acute on chronic respiratory failure.  He recently required intubation.  He has COPD.  He is on 4 L/min nasal cannula chronically.  He has a history of acute metabolic encephalopathy.  He has a history of a thoracic aortic aneurysm, status post TAVR.  He has coronary artery disease.  He has a history of congestive heart failure.  He is diabetic.  He is a former smoker.  He has had aspiration pneumonia.  He has a history of DVT.  He is not currently on oral anticoagulation.  He has a history of hypertension.  He has had pneumonia.  He has peripheral vascular disease.  He was brought in today because wife felt he was very sleepy.  He appeared short of breath.  He had increased cough and congestion.  No chest pain.  No fever.  No vomiting.  On arrival to the emergency room, patient was found to be hypoxic on his usual 4 L/min nasal cannula.      Prior to Admission Medications   Prescriptions Last Dose Informant Patient Reported? Taking?   albuterol (2.5 mg/3 mL) 0.083 % nebulizer solution   No No   Sig: TAKE 3 ML (2.5 MG TOTAL) BY NEBULIZATION EVERY 6 (SIX) HOURS AS NEEDED FOR WHEEZING OR SHORTNESS OF BREATH   albuterol (PROVENTIL HFA,VENTOLIN HFA) 90 mcg/act inhaler   No No   Sig: INHALE 2 PUFFS EVERY 6 (SIX) HOURS AS NEEDED FOR WHEEZING   budesonide (Pulmicort) 0.5 mg/2 mL nebulizer solution   No No   Sig: Take 2 mL (0.5 mg total) by nebulization 2 (two) times a day Rinse mouth after use.   formoterol (PERFOROMIST) 20 MCG/2ML nebulizer solution   No No   Sig: Take 2 mL (20 mcg total) by nebulization 2 (two) times a day   losartan (COZAAR) 25 mg tablet   No No   Sig: Take 2 tablets (50 mg  total) by mouth 2 (two) times a day   pantoprazole (PROTONIX) 40 mg tablet   No No   Sig: TAKE 1 TABLET (40 MG TOTAL) BY MOUTH DAILY   Patient not taking: Reported on 12/5/2023   predniSONE 5 mg tablet   Yes No   Sig: Take 1 tablet (5 mg total) by mouth daily Do not start before December 21, 2023.      Facility-Administered Medications: None       Past Medical History:   Diagnosis Date   • Acute metabolic encephalopathy 06/13/2020   • Acute on chronic respiratory failure (HCC) 12/4/2022   • Age-related cataract of right eye 4/4/2023    patient is medically cleared and presents with low risk of complication with this upcoming R cataract surgery.   • Anemia    • Aneurysm, aorta, thoracic ([facility])    • Appendicolith    • Ascending aortic aneurysm ([facility])     3.7   • Asthma    • BPH (benign prostatic hyperplasia)    • CAD (coronary artery disease)     noted on CT scan   • CHF (congestive heart failure) ([facility])    • COPD (chronic obstructive pulmonary disease) ([facility])    • Descending thoracic aortic aneurysm ([facility])    • Diabetes mellitus ([facility])    • Diverticulosis    • Former tobacco use    • GERD (gastroesophageal reflux disease)    • History of DVT (deep vein thrombosis)     Left leg   • History of transfusion    • Hypertension    • Inguinal hernia     right   • Nephrolithiasis    • Oxygen dependent     2LNC   • Oxygen dependent    • Pneumonia    • Pre-diabetes    • Prostate calculus    • PVD (peripheral vascular disease) ([facility])    • Recurrent right inguinal hernia w incarcertion 1/4/2023   • Small bowel obstruction ([facility]) 12/28/2022   • Thoracic aortic aneurysm without rupture ([facility]) 11/19/2018    Added automatically from request for surgery [id number]   • Thrombocytopenia ([facility]) 12/29/2018   • Ulcer    • 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[facility])    • Varicose vein of leg     b/l       Past Surgical History:   Procedure Laterality Date   • CARDIAC SURGERY     • ESOPHAGOGASTRODUODENOSCOPY     • HERNIA REPAIR Right  2019    Procedure: REPAIR HERNIA INGUINAL WITH MESH;  Surgeon: David Lowry MD;  Location:  MAIN OR;  Service: General   • HERNIA REPAIR Right 2023    Procedure: REPAIR RECURRENT INCARERATED HERNIA INGUINAL OPEN, RIGHT ORCHIECTOMY;  Surgeon: Mason Bertrand MD;  Location: [facility];  Service: General   • INGUINAL HERNIA REPAIR Bilateral    • IR TEVAR  2018   • FL EVASC RPR DTA COVERAGE ART ORIGIN 1ST ENDOPROSTH N/A 2018    Procedure: TEVAR - endovascular thoracic aortic aneurysm repair;  Surgeon: Gladis Ortega MD;  Location: [facility];  Service: Vascular   • THORACIC AORTIC ANEURYSM REPAIR  2018   • VARICOSE VEIN SURGERY Bilateral     vein stripping       Family History   Problem Relation Age of Onset   • Tuberculosis Mother    • No Known Problems Father    • Cancer Sister    • Diabetes Family    • Hypertension Family      I have reviewed and agree with the history as documented.    E-Cigarette/Vaping   • E-Cigarette Use Never User      E-Cigarette/Vaping Substances     Social History     Tobacco Use   • Smoking status: Former     Current packs/day: 0.00     Average packs/day: 1 pack/day for 35.0 years (35.0 ttl pk-yrs)     Types: Cigarettes     Start date:      Quit date:      Years since quittin.0   • Smokeless tobacco: Never   Vaping Use   • Vaping status: Never Used   Substance Use Topics   • Alcohol use: Never   • Drug use: No       Review of Systems   Constitutional:  Positive for fatigue. Negative for chills and fever.   HENT:  Negative for rhinorrhea and sore throat.    Eyes:  Negative for pain, redness and visual disturbance.   Respiratory:  Positive for cough, shortness of breath and wheezing.    Cardiovascular:  Negative for chest pain and leg swelling.   Gastrointestinal:  Negative for abdominal pain, diarrhea and vomiting.   Endocrine: Negative for polydipsia and polyuria.   Genitourinary:  Negative for dysuria, frequency and hematuria.   Musculoskeletal:   Negative for back pain and neck pain.   Skin:  Negative for rash and wound.   Allergic/Immunologic: Negative for immunocompromised state.   Neurological:  Positive for weakness. Negative for numbness and headaches.   Psychiatric/Behavioral:  Negative for hallucinations and suicidal ideas.    All other systems reviewed and are negative.      Physical Exam  Physical Exam  Vitals reviewed.   Constitutional:       General: He is in acute distress.   HENT:      Head: Normocephalic and atraumatic.      Nose: Nose normal.      Mouth/Throat:      Mouth: Mucous membranes are dry.   Eyes:      General: No scleral icterus.        Right eye: No discharge.         Left eye: No discharge.      Extraocular Movements: Extraocular movements intact.      Conjunctiva/sclera: Conjunctivae normal.      Pupils: Pupils are equal, round, and reactive to light.   Cardiovascular:      Rate and Rhythm: Normal rate and regular rhythm.      Heart sounds: No murmur heard.     No friction rub. No gallop.   Pulmonary:      Effort: Respiratory distress present.      Breath sounds: Wheezing present.      Comments: Patient is moderately tachypneic.  Abdominal:      General: Bowel sounds are normal. There is no distension.      Palpations: Abdomen is soft.      Tenderness: There is no abdominal tenderness.   Musculoskeletal:         General: No swelling, tenderness, deformity or signs of injury.      Cervical back: Neck supple. No rigidity.      Right lower leg: No edema.      Left lower leg: No edema.   Skin:     General: Skin is warm and dry.   Neurological:      Mental Status: He is lethargic.      Comments: Lethargic.  No lateralizing deficits.       Vital Signs  ED Triage Vitals [12/27/23 1910]   Temperature Pulse Respirations Blood Pressure SpO2   98.6 °F (37 °C) 85 (!) 26 156/87 (!) 86 %      Temp Source Heart Rate Source Patient Position - Orthostatic VS BP Location FiO2 (%)   Tympanic Monitor Lying Left arm --      Pain Score       --            Vitals:    12/27/23 1910   BP: 156/87   Pulse: 85   Patient Position - Orthostatic VS: Lying         Visual Acuity      ED Medications  Medications   albuterol inhalation solution 10 mg (has no administration in time range)   ipratropium (ATROVENT) 0.02 % inhalation solution 1 mg (has no administration in time range)   sodium chloride 0.9 % inhalation solution 12 mL (has no administration in time range)   methylPREDNISolone sodium succinate (Solu-MEDROL) injection 125 mg (has no administration in time range)       Diagnostic Studies  Results Reviewed       Procedure Component Value Units Date/Time    Comprehensive metabolic panel [[id number]]     Lab Status: No result Specimen: Blood     TSH, 3rd generation with Free T4 reflex [[id number]]     Lab Status: No result Specimen: Blood     Lactic acid, plasma (w/reflex if result > 2.0) [[id number]]     Lab Status: No result Specimen: Blood     HS Troponin 0hr (reflex protocol) [[id number]]     Lab Status: No result Specimen: Blood     B-Type Natriuretic Peptide(BNP) [[id number]]     Lab Status: No result Specimen: Blood     Blood gas, venous [[id number]]     Lab Status: No result Specimen: Blood     CBC and differential [[id number]]     Lab Status: No result Specimen: Blood     FLU/RSV/COVID - if FLU/RSV clinically relevant [[id number]]     Lab Status: No result Specimen: Nares from Nose     Blood culture #1 [[id number]]     Lab Status: No result Specimen: Blood     Blood culture #2 [[id number]]     Lab Status: No result Specimen: Blood     UA w Reflex to Microscopic w Reflex to Culture [[id number]]     Lab Status: No result Specimen: Urine                    CTA ED chest PE study    (Results Pending)              Procedures  Procedures         ED Course                                             Medical Decision Making  Amount and/or Complexity of Data Reviewed  Labs: ordered.  Radiology: ordered.    Risk  Prescription drug management.           Disposition  Final  diagnoses:   None     ED Disposition       None          Follow-up Information    None         Patient's Medications   Discharge Prescriptions    No medications on file       No discharge procedures on file.    PDMP Review         Value Time User    PDMP Reviewed  Yes 7/28/2023 10:27 AM Kirby Casanova MD            ED Provider  Electronically Signed by

## 2023-12-28 NOTE — ED NOTES
Patient and family again seen by provider with discharge instructions and follow up plan of care discussed with family and in agreement with same. Patient is awake and alert without any complaints other than wanting to go home.     Cat Cunningham RN  12/27/23 [id number]

## 2023-12-28 NOTE — ED NOTES
Pt given urinal for urine sample, however wife emptied urinal into toilet. Pt given new urinal and reminded to give another sample when possible.     Natty Garcia RN  12/27/23 [id number]     Nursing home

## 2023-12-28 NOTE — ED NOTES
Patient refusing bipap. Provider in room discussing with family plans for admission or discharge. Patient does not want to stay in the hospital.     Cat Cunningham RN  12/27/23 2989

## 2023-12-29 LAB
DME PARACHUTE DELIVERY DATE REQUESTED: NORMAL
DME PARACHUTE ITEM DESCRIPTION: NORMAL
DME PARACHUTE ORDER STATUS: NORMAL
DME PARACHUTE SUPPLIER NAME: NORMAL
DME PARACHUTE SUPPLIER PHONE: NORMAL

## 2023-12-31 LAB
BACTERIA BLD CULT: NORMAL
BACTERIA BLD CULT: NORMAL

## 2024-01-01 ENCOUNTER — APPOINTMENT (EMERGENCY)
Dept: RADIOLOGY | Facility: HOSPITAL | Age: 89
DRG: 189 | End: 2024-01-01
Payer: MEDICARE

## 2024-01-01 ENCOUNTER — HOSPITAL ENCOUNTER (INPATIENT)
Facility: HOSPITAL | Age: 89
LOS: 5 days | Discharge: HOME | DRG: 189 | End: 2024-01-07
Attending: EMERGENCY MEDICINE | Admitting: INTERNAL MEDICINE
Payer: MEDICARE

## 2024-01-01 DIAGNOSIS — Z71.89 GOALS OF CARE, COUNSELING/DISCUSSION: ICD-10-CM

## 2024-01-01 DIAGNOSIS — R41.82 ALTERED MENTAL STATUS: ICD-10-CM

## 2024-01-01 DIAGNOSIS — J96.22 ACUTE ON CHRONIC RESPIRATORY FAILURE WITH HYPERCAPNIA (HCC): Primary | ICD-10-CM

## 2024-01-01 DIAGNOSIS — K62.5 BRIGHT RED BLOOD PER RECTUM: ICD-10-CM

## 2024-01-01 LAB
2HR DELTA HS TROPONIN: -1 NG/L
ALBUMIN SERPL BCP-MCNC: 3.7 G/DL (ref 3.5–5)
ALP SERPL-CCNC: 80 U/L (ref 34–104)
ALT SERPL W P-5'-P-CCNC: 28 U/L (ref 7–52)
AMORPH URATE CRY URNS QL MICRO: ABNORMAL
ARTERIAL PATENCY WRIST A: YES
AST SERPL W P-5'-P-CCNC: 26 U/L (ref 13–39)
BACTERIA BLD CULT: NORMAL
BACTERIA BLD CULT: NORMAL
BACTERIA UR QL AUTO: ABNORMAL /HPF
BASE EX.OXY STD BLDV CALC-SCNC: 48.5 % (ref 60–80)
BASE EX.OXY STD BLDV CALC-SCNC: 79.8 % (ref 60–80)
BASE EXCESS BLDA CALC-SCNC: 16.3 MMOL/L
BASE EXCESS BLDV CALC-SCNC: 13.7 MMOL/L
BASE EXCESS BLDV CALC-SCNC: 19.3 MMOL/L
BASOPHILS # BLD AUTO: 0.01 THOUSANDS/ÂΜL (ref 0–0.1)
BASOPHILS NFR BLD AUTO: 0 % (ref 0–1)
BILIRUB SERPL-MCNC: 0.35 MG/DL (ref 0.2–1)
BILIRUB UR QL STRIP: NEGATIVE
BNP SERPL-MCNC: 48 PG/ML (ref 0–100)
BUN SERPL-MCNC: 27 MG/DL (ref 5–25)
CALCIUM SERPL-MCNC: 9.9 MG/DL (ref 8.4–10.2)
CARDIAC TROPONIN I PNL SERPL HS: 10 NG/L
CARDIAC TROPONIN I PNL SERPL HS: 11 NG/L
CHLORIDE SERPL-SCNC: 95 MMOL/L (ref 96–108)
CLARITY UR: ABNORMAL
CO2 SERPL-SCNC: >45 MMOL/L (ref 21–32)
COLOR UR: YELLOW
CREAT SERPL-MCNC: 0.85 MG/DL (ref 0.6–1.3)
EOSINOPHIL # BLD AUTO: 0.01 THOUSAND/ÂΜL (ref 0–0.61)
EOSINOPHIL NFR BLD AUTO: 0 % (ref 0–6)
ERYTHROCYTE [DISTWIDTH] IN BLOOD BY AUTOMATED COUNT: 12.8 % (ref 11.6–15.1)
GFR SERPL CREATININE-BSD FRML MDRD: 78 ML/MIN/1.73SQ M
GLUCOSE SERPL-MCNC: 300 MG/DL (ref 65–140)
GLUCOSE UR STRIP-MCNC: ABNORMAL MG/DL
HCO3 BLDA-SCNC: 46.2 MMOL/L (ref 22–28)
HCO3 BLDV-SCNC: 44.8 MMOL/L (ref 24–30)
HCO3 BLDV-SCNC: 50.5 MMOL/L (ref 24–30)
HCT VFR BLD AUTO: 43.4 % (ref 36.5–49.3)
HGB BLD-MCNC: 12.5 G/DL (ref 12–17)
HGB UR QL STRIP.AUTO: NEGATIVE
IMM GRANULOCYTES # BLD AUTO: 0.02 THOUSAND/UL (ref 0–0.2)
IMM GRANULOCYTES NFR BLD AUTO: 1 % (ref 0–2)
IPAP: 12
KETONES UR STRIP-MCNC: NEGATIVE MG/DL
LACTATE SERPL-SCNC: 1.2 MMOL/L (ref 0.5–2)
LEUKOCYTE ESTERASE UR QL STRIP: NEGATIVE
LYMPHOCYTES # BLD AUTO: 0.48 THOUSANDS/ÂΜL (ref 0.6–4.47)
LYMPHOCYTES NFR BLD AUTO: 11 % (ref 14–44)
MCH RBC QN AUTO: 31.9 PG (ref 26.8–34.3)
MCHC RBC AUTO-ENTMCNC: 28.8 G/DL (ref 31.4–37.4)
MCV RBC AUTO: 111 FL (ref 82–98)
MONOCYTES # BLD AUTO: 0.07 THOUSAND/ÂΜL (ref 0.17–1.22)
MONOCYTES NFR BLD AUTO: 2 % (ref 4–12)
MUCOUS THREADS UR QL AUTO: ABNORMAL
NEUTROPHILS # BLD AUTO: 3.64 THOUSANDS/ÂΜL (ref 1.85–7.62)
NEUTS SEG NFR BLD AUTO: 86 % (ref 43–75)
NITRITE UR QL STRIP: NEGATIVE
NON VENT- BIPAP: ABNORMAL
NON-SQ EPI CELLS URNS QL MICRO: ABNORMAL /HPF
NRBC BLD AUTO-RTO: 0 /100 WBCS
O2 CT BLDA-SCNC: 16.5 ML/DL (ref 16–23)
O2 CT BLDV-SCNC: 14.5 ML/DL
O2 CT BLDV-SCNC: 9.1 ML/DL
OXYHGB MFR BLDA: 95.5 % (ref 94–97)
PCO2 BLDA: 89.5 MM HG (ref 36–44)
PCO2 BLDV: 101 MM HG (ref 42–50)
PCO2 BLDV: 97 MM HG (ref 42–50)
PEEP MAX SETTING VENT: 6 CM[H2O]
PH BLDA: 7.33 [PH] (ref 7.35–7.45)
PH BLDV: 7.28 [PH] (ref 7.3–7.4)
PH BLDV: 7.32 [PH] (ref 7.3–7.4)
PH UR STRIP.AUTO: 7 [PH]
PLATELET # BLD AUTO: 122 THOUSANDS/UL (ref 149–390)
PMV BLD AUTO: 9.3 FL (ref 8.9–12.7)
PO2 BLDA: 87.9 MM HG (ref 75–129)
PO2 BLDV: 28.7 MM HG (ref 35–45)
PO2 BLDV: 47.3 MM HG (ref 35–45)
POTASSIUM SERPL-SCNC: 4.5 MMOL/L (ref 3.5–5.3)
POTASSIUM SERPL-SCNC: 6.3 MMOL/L (ref 3.5–5.3)
PROT SERPL-MCNC: 7 G/DL (ref 6.4–8.4)
PROT UR STRIP-MCNC: ABNORMAL MG/DL
RBC # BLD AUTO: 3.92 MILLION/UL (ref 3.88–5.62)
RBC #/AREA URNS AUTO: ABNORMAL /HPF
SODIUM SERPL-SCNC: 142 MMOL/L (ref 135–147)
SP GR UR STRIP.AUTO: 1.02 (ref 1–1.03)
SPECIMEN SOURCE: ABNORMAL
TSH SERPL DL<=0.05 MIU/L-ACNC: 0.09 UIU/ML (ref 0.45–4.5)
UROBILINOGEN UR STRIP-ACNC: <2 MG/DL
VENT BIPAP FIO2: 40 %
WBC # BLD AUTO: 4.23 THOUSAND/UL (ref 4.31–10.16)
WBC #/AREA URNS AUTO: ABNORMAL /HPF

## 2024-01-01 PROCEDURE — 99285 EMERGENCY DEPT VISIT HI MDM: CPT

## 2024-01-01 PROCEDURE — 71045 X-RAY EXAM CHEST 1 VIEW: CPT

## 2024-01-01 PROCEDURE — 36600 WITHDRAWAL OF ARTERIAL BLOOD: CPT

## 2024-01-01 PROCEDURE — 94760 N-INVAS EAR/PLS OXIMETRY 1: CPT

## 2024-01-01 PROCEDURE — 94002 VENT MGMT INPAT INIT DAY: CPT

## 2024-01-01 PROCEDURE — 83880 ASSAY OF NATRIURETIC PEPTIDE: CPT | Performed by: PHYSICIAN ASSISTANT

## 2024-01-01 PROCEDURE — 94640 AIRWAY INHALATION TREATMENT: CPT

## 2024-01-01 PROCEDURE — 85025 COMPLETE CBC W/AUTO DIFF WBC: CPT | Performed by: PHYSICIAN ASSISTANT

## 2024-01-01 PROCEDURE — 80053 COMPREHEN METABOLIC PANEL: CPT | Performed by: PHYSICIAN ASSISTANT

## 2024-01-01 PROCEDURE — 81001 URINALYSIS AUTO W/SCOPE: CPT | Performed by: PHYSICIAN ASSISTANT

## 2024-01-01 PROCEDURE — 96374 THER/PROPH/DIAG INJ IV PUSH: CPT

## 2024-01-01 PROCEDURE — 96376 TX/PRO/DX INJ SAME DRUG ADON: CPT

## 2024-01-01 PROCEDURE — 87040 BLOOD CULTURE FOR BACTERIA: CPT | Performed by: PHYSICIAN ASSISTANT

## 2024-01-01 PROCEDURE — 93005 ELECTROCARDIOGRAM TRACING: CPT

## 2024-01-01 PROCEDURE — 84484 ASSAY OF TROPONIN QUANT: CPT | Performed by: PHYSICIAN ASSISTANT

## 2024-01-01 PROCEDURE — 94644 CONT INHLJ TX 1ST HOUR: CPT

## 2024-01-01 PROCEDURE — 82805 BLOOD GASES W/O2 SATURATION: CPT | Performed by: PHYSICIAN ASSISTANT

## 2024-01-01 PROCEDURE — 83605 ASSAY OF LACTIC ACID: CPT | Performed by: PHYSICIAN ASSISTANT

## 2024-01-01 PROCEDURE — 94664 DEMO&/EVAL PT USE INHALER: CPT

## 2024-01-01 PROCEDURE — 99285 EMERGENCY DEPT VISIT HI MDM: CPT | Performed by: PHYSICIAN ASSISTANT

## 2024-01-01 PROCEDURE — 84443 ASSAY THYROID STIM HORMONE: CPT | Performed by: PHYSICIAN ASSISTANT

## 2024-01-01 PROCEDURE — 84132 ASSAY OF SERUM POTASSIUM: CPT | Performed by: PHYSICIAN ASSISTANT

## 2024-01-01 PROCEDURE — 36415 COLL VENOUS BLD VENIPUNCTURE: CPT | Performed by: PHYSICIAN ASSISTANT

## 2024-01-01 PROCEDURE — 84439 ASSAY OF FREE THYROXINE: CPT | Performed by: PHYSICIAN ASSISTANT

## 2024-01-01 RX ORDER — SODIUM CHLORIDE FOR INHALATION 0.9 %
12 VIAL, NEBULIZER (ML) INHALATION ONCE
Status: COMPLETED | OUTPATIENT
Start: 2024-01-01 | End: 2024-01-01

## 2024-01-01 RX ORDER — LORAZEPAM 2 MG/ML
1 INJECTION INTRAMUSCULAR ONCE
Status: COMPLETED | OUTPATIENT
Start: 2024-01-01 | End: 2024-01-01

## 2024-01-01 RX ORDER — LORAZEPAM 2 MG/ML
0.5 INJECTION INTRAMUSCULAR ONCE
Status: COMPLETED | OUTPATIENT
Start: 2024-01-01 | End: 2024-01-01

## 2024-01-01 RX ADMIN — LORAZEPAM 0.5 MG: 2 INJECTION INTRAMUSCULAR; INTRAVENOUS at 21:39

## 2024-01-01 RX ADMIN — IPRATROPIUM BROMIDE 1 MG: 0.5 SOLUTION RESPIRATORY (INHALATION) at 20:42

## 2024-01-01 RX ADMIN — Medication 12 ML: at 20:42

## 2024-01-01 RX ADMIN — LORAZEPAM 1 MG: 2 INJECTION INTRAMUSCULAR; INTRAVENOUS at 22:50

## 2024-01-01 RX ADMIN — ALBUTEROL SULFATE 10 MG: 2.5 SOLUTION RESPIRATORY (INHALATION) at 20:42

## 2024-01-01 NOTE — Clinical Note
Case was discussed with Critical Care and the patient's admission status was agreed to be Admission Status: inpatient status to the service of Dr. Townsend

## 2024-01-02 ENCOUNTER — PATIENT OUTREACH (OUTPATIENT)
Dept: FAMILY MEDICINE CLINIC | Facility: CLINIC | Age: 89
End: 2024-01-02

## 2024-01-02 LAB
4HR DELTA HS TROPONIN: -2 NG/L
ALBUMIN SERPL BCP-MCNC: 3.5 G/DL (ref 3.5–5)
ALP SERPL-CCNC: 66 U/L (ref 34–104)
ALT SERPL W P-5'-P-CCNC: 24 U/L (ref 7–52)
ARTERIAL PATENCY WRIST A: YES
AST SERPL W P-5'-P-CCNC: 14 U/L (ref 13–39)
ATRIAL RATE: 73 BPM
ATRIAL RATE: 86 BPM
ATRIAL RATE: 94 BPM
BASE EXCESS BLDA CALC-SCNC: 18 MMOL/L
BASOPHILS # BLD AUTO: 0.02 THOUSANDS/ÂΜL (ref 0–0.1)
BASOPHILS NFR BLD AUTO: 0 % (ref 0–1)
BILIRUB SERPL-MCNC: 0.34 MG/DL (ref 0.2–1)
BUN SERPL-MCNC: 25 MG/DL (ref 5–25)
CA-I BLD-SCNC: 1.19 MMOL/L (ref 1.12–1.32)
CALCIUM SERPL-MCNC: 10 MG/DL (ref 8.4–10.2)
CARDIAC TROPONIN I PNL SERPL HS: 9 NG/L
CHLORIDE SERPL-SCNC: 98 MMOL/L (ref 96–108)
CO2 SERPL-SCNC: >45 MMOL/L (ref 21–32)
CREAT SERPL-MCNC: 0.77 MG/DL (ref 0.6–1.3)
DME PARACHUTE DELIVERY DATE ACTUAL: NORMAL
DME PARACHUTE DELIVERY DATE REQUESTED: NORMAL
DME PARACHUTE ITEM DESCRIPTION: NORMAL
DME PARACHUTE ORDER STATUS: NORMAL
DME PARACHUTE SUPPLIER NAME: NORMAL
DME PARACHUTE SUPPLIER PHONE: NORMAL
EOSINOPHIL # BLD AUTO: 0.01 THOUSAND/ÂΜL (ref 0–0.61)
EOSINOPHIL NFR BLD AUTO: 0 % (ref 0–6)
ERYTHROCYTE [DISTWIDTH] IN BLOOD BY AUTOMATED COUNT: 12.7 % (ref 11.6–15.1)
FLUAV RNA RESP QL NAA+PROBE: NEGATIVE
FLUBV RNA RESP QL NAA+PROBE: NEGATIVE
GFR SERPL CREATININE-BSD FRML MDRD: 82 ML/MIN/1.73SQ M
GLUCOSE SERPL-MCNC: 128 MG/DL (ref 65–140)
HCO3 BLDA-SCNC: 48.9 MMOL/L (ref 22–28)
HCT VFR BLD AUTO: 39.9 % (ref 36.5–49.3)
HGB BLD-MCNC: 11.9 G/DL (ref 12–17)
IMM GRANULOCYTES # BLD AUTO: 0.02 THOUSAND/UL (ref 0–0.2)
IMM GRANULOCYTES NFR BLD AUTO: 0 % (ref 0–2)
IPAP: 16
LYMPHOCYTES # BLD AUTO: 1.07 THOUSANDS/ÂΜL (ref 0.6–4.47)
LYMPHOCYTES NFR BLD AUTO: 22 % (ref 14–44)
MAGNESIUM SERPL-MCNC: 2.1 MG/DL (ref 1.9–2.7)
MCH RBC QN AUTO: 32.1 PG (ref 26.8–34.3)
MCHC RBC AUTO-ENTMCNC: 29.8 G/DL (ref 31.4–37.4)
MCV RBC AUTO: 108 FL (ref 82–98)
MONOCYTES # BLD AUTO: 0.43 THOUSAND/ÂΜL (ref 0.17–1.22)
MONOCYTES NFR BLD AUTO: 9 % (ref 4–12)
NEUTROPHILS # BLD AUTO: 3.38 THOUSANDS/ÂΜL (ref 1.85–7.62)
NEUTS SEG NFR BLD AUTO: 69 % (ref 43–75)
NON VENT- BIPAP: ABNORMAL
NRBC BLD AUTO-RTO: 0 /100 WBCS
O2 CT BLDA-SCNC: 16.3 ML/DL (ref 16–23)
OXYHGB MFR BLDA: 95.1 % (ref 94–97)
P AXIS: 69 DEGREES
P AXIS: 75 DEGREES
P AXIS: 76 DEGREES
PCO2 BLDA: 100.9 MM HG (ref 36–44)
PEEP MAX SETTING VENT: 6 CM[H2O]
PH BLDA: 7.3 [PH] (ref 7.35–7.45)
PHOSPHATE SERPL-MCNC: 2.6 MG/DL (ref 2.3–4.1)
PLATELET # BLD AUTO: 118 THOUSANDS/UL (ref 149–390)
PMV BLD AUTO: 9.1 FL (ref 8.9–12.7)
PO2 BLDA: 88 MM HG (ref 75–129)
POTASSIUM SERPL-SCNC: 4.5 MMOL/L (ref 3.5–5.3)
PR INTERVAL: 132 MS
PR INTERVAL: 140 MS
PR INTERVAL: 146 MS
PROCALCITONIN SERPL-MCNC: 0.05 NG/ML
PROT SERPL-MCNC: 6.3 G/DL (ref 6.4–8.4)
QRS AXIS: 76 DEGREES
QRS AXIS: 78 DEGREES
QRS AXIS: 83 DEGREES
QRSD INTERVAL: 82 MS
QRSD INTERVAL: 84 MS
QRSD INTERVAL: 86 MS
QT INTERVAL: 328 MS
QT INTERVAL: 364 MS
QT INTERVAL: 386 MS
QTC INTERVAL: 410 MS
QTC INTERVAL: 425 MS
QTC INTERVAL: 435 MS
RBC # BLD AUTO: 3.71 MILLION/UL (ref 3.88–5.62)
RSV RNA RESP QL NAA+PROBE: NEGATIVE
SARS-COV-2 RNA RESP QL NAA+PROBE: NEGATIVE
SODIUM SERPL-SCNC: 147 MMOL/L (ref 135–147)
SPECIMEN SOURCE: ABNORMAL
T WAVE AXIS: 65 DEGREES
T WAVE AXIS: 68 DEGREES
T WAVE AXIS: 77 DEGREES
T4 FREE SERPL-MCNC: 0.76 NG/DL (ref 0.61–1.12)
VENT BIPAP FIO2: 40 %
VENTRICULAR RATE: 73 BPM
VENTRICULAR RATE: 86 BPM
VENTRICULAR RATE: 94 BPM
WBC # BLD AUTO: 4.93 THOUSAND/UL (ref 4.31–10.16)

## 2024-01-02 PROCEDURE — 94640 AIRWAY INHALATION TREATMENT: CPT

## 2024-01-02 PROCEDURE — 84145 PROCALCITONIN (PCT): CPT | Performed by: NURSE PRACTITIONER

## 2024-01-02 PROCEDURE — 84484 ASSAY OF TROPONIN QUANT: CPT | Performed by: PHYSICIAN ASSISTANT

## 2024-01-02 PROCEDURE — 82330 ASSAY OF CALCIUM: CPT | Performed by: NURSE PRACTITIONER

## 2024-01-02 PROCEDURE — 99291 CRITICAL CARE FIRST HOUR: CPT | Performed by: NURSE PRACTITIONER

## 2024-01-02 PROCEDURE — 80053 COMPREHEN METABOLIC PANEL: CPT | Performed by: NURSE PRACTITIONER

## 2024-01-02 PROCEDURE — 0241U HB NFCT DS VIR RESP RNA 4 TRGT: CPT | Performed by: NURSE PRACTITIONER

## 2024-01-02 PROCEDURE — 84100 ASSAY OF PHOSPHORUS: CPT | Performed by: NURSE PRACTITIONER

## 2024-01-02 PROCEDURE — 93005 ELECTROCARDIOGRAM TRACING: CPT

## 2024-01-02 PROCEDURE — 94760 N-INVAS EAR/PLS OXIMETRY 1: CPT

## 2024-01-02 PROCEDURE — 85025 COMPLETE CBC W/AUTO DIFF WBC: CPT | Performed by: NURSE PRACTITIONER

## 2024-01-02 PROCEDURE — 36415 COLL VENOUS BLD VENIPUNCTURE: CPT | Performed by: PHYSICIAN ASSISTANT

## 2024-01-02 PROCEDURE — 36600 WITHDRAWAL OF ARTERIAL BLOOD: CPT

## 2024-01-02 PROCEDURE — 94003 VENT MGMT INPAT SUBQ DAY: CPT

## 2024-01-02 PROCEDURE — 82805 BLOOD GASES W/O2 SATURATION: CPT | Performed by: PHYSICIAN ASSISTANT

## 2024-01-02 PROCEDURE — 83735 ASSAY OF MAGNESIUM: CPT | Performed by: NURSE PRACTITIONER

## 2024-01-02 RX ORDER — HYDROXYZINE HYDROCHLORIDE 25 MG/1
25 TABLET, FILM COATED ORAL EVERY 6 HOURS PRN
Status: DISCONTINUED | OUTPATIENT
Start: 2024-01-02 | End: 2024-01-07 | Stop reason: HOSPADM

## 2024-01-02 RX ORDER — METHYLPREDNISOLONE SODIUM SUCCINATE 125 MG/2ML
60 INJECTION, POWDER, LYOPHILIZED, FOR SOLUTION INTRAMUSCULAR; INTRAVENOUS EVERY 8 HOURS SCHEDULED
Status: DISCONTINUED | OUTPATIENT
Start: 2024-01-02 | End: 2024-01-03

## 2024-01-02 RX ORDER — AZITHROMYCIN 250 MG/1
500 TABLET, FILM COATED ORAL EVERY 24 HOURS
Status: DISCONTINUED | OUTPATIENT
Start: 2024-01-03 | End: 2024-01-04

## 2024-01-02 RX ORDER — LEVALBUTEROL INHALATION SOLUTION 1.25 MG/3ML
1.25 SOLUTION RESPIRATORY (INHALATION)
Status: DISCONTINUED | OUTPATIENT
Start: 2024-01-03 | End: 2024-01-06

## 2024-01-02 RX ORDER — LEVALBUTEROL INHALATION SOLUTION 1.25 MG/3ML
1.25 SOLUTION RESPIRATORY (INHALATION)
Status: DISCONTINUED | OUTPATIENT
Start: 2024-01-02 | End: 2024-01-02

## 2024-01-02 RX ORDER — ALPRAZOLAM 0.25 MG/1
0.25 TABLET ORAL ONCE
Status: COMPLETED | OUTPATIENT
Start: 2024-01-02 | End: 2024-01-02

## 2024-01-02 RX ORDER — HYDRALAZINE HYDROCHLORIDE 20 MG/ML
10 INJECTION INTRAMUSCULAR; INTRAVENOUS EVERY 6 HOURS PRN
Status: DISCONTINUED | OUTPATIENT
Start: 2024-01-02 | End: 2024-01-07 | Stop reason: HOSPADM

## 2024-01-02 RX ORDER — BUDESONIDE 0.5 MG/2ML
0.5 INHALANT ORAL 2 TIMES DAILY
Status: DISCONTINUED | OUTPATIENT
Start: 2024-01-02 | End: 2024-01-02

## 2024-01-02 RX ORDER — HYDROMORPHONE HCL/PF 1 MG/ML
0.5 SYRINGE (ML) INJECTION ONCE
Status: COMPLETED | OUTPATIENT
Start: 2024-01-02 | End: 2024-01-02

## 2024-01-02 RX ORDER — LOSARTAN POTASSIUM 50 MG/1
50 TABLET ORAL 2 TIMES DAILY
Status: DISCONTINUED | OUTPATIENT
Start: 2024-01-02 | End: 2024-01-07 | Stop reason: HOSPADM

## 2024-01-02 RX ORDER — DEXMEDETOMIDINE HYDROCHLORIDE 4 UG/ML
.1-.7 INJECTION, SOLUTION INTRAVENOUS
Status: DISCONTINUED | OUTPATIENT
Start: 2024-01-02 | End: 2024-01-04

## 2024-01-02 RX ORDER — PANTOPRAZOLE SODIUM 40 MG/1
40 TABLET, DELAYED RELEASE ORAL
Status: DISCONTINUED | OUTPATIENT
Start: 2024-01-02 | End: 2024-01-03

## 2024-01-02 RX ORDER — HEPARIN SODIUM 5000 [USP'U]/ML
5000 INJECTION, SOLUTION INTRAVENOUS; SUBCUTANEOUS EVERY 8 HOURS SCHEDULED
Status: DISCONTINUED | OUTPATIENT
Start: 2024-01-02 | End: 2024-01-03

## 2024-01-02 RX ORDER — CHLORHEXIDINE GLUCONATE ORAL RINSE 1.2 MG/ML
15 SOLUTION DENTAL EVERY 12 HOURS SCHEDULED
Status: DISCONTINUED | OUTPATIENT
Start: 2024-01-02 | End: 2024-01-04

## 2024-01-02 RX ADMIN — METHYLPREDNISOLONE SODIUM SUCCINATE 60 MG: 125 INJECTION, POWDER, FOR SOLUTION INTRAMUSCULAR; INTRAVENOUS at 21:34

## 2024-01-02 RX ADMIN — CHLORHEXIDINE GLUCONATE 0.12% ORAL RINSE 15 ML: 1.2 LIQUID ORAL at 21:34

## 2024-01-02 RX ADMIN — ALPRAZOLAM 0.25 MG: 0.25 TABLET ORAL at 14:09

## 2024-01-02 RX ADMIN — LEVALBUTEROL HYDROCHLORIDE 1.25 MG: 1.25 SOLUTION RESPIRATORY (INHALATION) at 08:47

## 2024-01-02 RX ADMIN — PANTOPRAZOLE SODIUM 40 MG: 40 TABLET, DELAYED RELEASE ORAL at 13:47

## 2024-01-02 RX ADMIN — HYDROMORPHONE HYDROCHLORIDE 0.5 MG: 1 INJECTION, SOLUTION INTRAMUSCULAR; INTRAVENOUS; SUBCUTANEOUS at 14:34

## 2024-01-02 RX ADMIN — HYDRALAZINE HYDROCHLORIDE 10 MG: 20 INJECTION, SOLUTION INTRAMUSCULAR; INTRAVENOUS at 14:24

## 2024-01-02 RX ADMIN — LEVALBUTEROL HYDROCHLORIDE 1.25 MG: 1.25 SOLUTION RESPIRATORY (INHALATION) at 03:59

## 2024-01-02 RX ADMIN — DEXMEDETOMIDINE HYDROCHLORIDE 0.1 MCG/KG/HR: 4 INJECTION, SOLUTION INTRAVENOUS at 14:47

## 2024-01-02 RX ADMIN — HEPARIN SODIUM 5000 UNITS: 5000 INJECTION INTRAVENOUS; SUBCUTANEOUS at 13:46

## 2024-01-02 RX ADMIN — HYDROXYZINE HYDROCHLORIDE 25 MG: 25 TABLET, FILM COATED ORAL at 12:03

## 2024-01-02 RX ADMIN — IPRATROPIUM BROMIDE 0.5 MG: 0.5 SOLUTION RESPIRATORY (INHALATION) at 13:34

## 2024-01-02 RX ADMIN — METHYLPREDNISOLONE SODIUM SUCCINATE 60 MG: 125 INJECTION, POWDER, FOR SOLUTION INTRAMUSCULAR; INTRAVENOUS at 13:46

## 2024-01-02 RX ADMIN — AZITHROMYCIN 500 MG: 500 INJECTION, POWDER, LYOPHILIZED, FOR SOLUTION INTRAVENOUS at 06:22

## 2024-01-02 RX ADMIN — METHYLPREDNISOLONE SODIUM SUCCINATE 60 MG: 125 INJECTION, POWDER, FOR SOLUTION INTRAMUSCULAR; INTRAVENOUS at 06:05

## 2024-01-02 RX ADMIN — HEPARIN SODIUM 5000 UNITS: 5000 INJECTION INTRAVENOUS; SUBCUTANEOUS at 21:34

## 2024-01-02 RX ADMIN — HEPARIN SODIUM 5000 UNITS: 5000 INJECTION INTRAVENOUS; SUBCUTANEOUS at 06:05

## 2024-01-02 RX ADMIN — LOSARTAN POTASSIUM 50 MG: 50 TABLET, FILM COATED ORAL at 21:34

## 2024-01-02 RX ADMIN — LOSARTAN POTASSIUM 50 MG: 50 TABLET, FILM COATED ORAL at 11:56

## 2024-01-02 NOTE — ED PROVIDER NOTES
History  Chief Complaint   Patient presents with    Shortness of Breath     Pt brought by wife for increasing sob. Pt seen here in early December for sob and was intubated due to high carbon dioxide.      This is an 87 y/o M past medical history of COPD, stage III CKD, chronic respiratory failure on oxygen, hypertension, history of DVT who presented with family for increased confusion. History provided by son. Pt was recently admitted early December and intubated at that time due to acute respiratory failure. Family states at times he is not answering questions correctly and saying things that do not make sense. His wife reports he has been having worsening SOB so she brought him to the ED. He is awake and talking. He was hypoxic on arrival, chronically on O2 son says 2-3L. Here was on 6L upon arrival with normal sat.       History provided by:  Relative (Son)   used: Yes (Son)        Prior to Admission Medications   Prescriptions Last Dose Informant Patient Reported? Taking?   albuterol (2.5 mg/3 mL) 0.083 % nebulizer solution   No No   Sig: TAKE 3 ML (2.5 MG TOTAL) BY NEBULIZATION EVERY 6 (SIX) HOURS AS NEEDED FOR WHEEZING OR SHORTNESS OF BREATH   albuterol (PROVENTIL HFA,VENTOLIN HFA) 90 mcg/act inhaler   No No   Sig: INHALE 2 PUFFS EVERY 6 (SIX) HOURS AS NEEDED FOR WHEEZING   budesonide (Pulmicort) 0.5 mg/2 mL nebulizer solution   No No   Sig: Take 2 mL (0.5 mg total) by nebulization 2 (two) times a day Rinse mouth after use.   carbamide peroxide (DEBROX) 6.5 % otic solution   No No   Sig: Administer 5 drops into ears 2 (two) times a day   formoterol (PERFOROMIST) 20 MCG/2ML nebulizer solution   No No   Sig: Take 2 mL (20 mcg total) by nebulization 2 (two) times a day   losartan (COZAAR) 25 mg tablet   No No   Sig: Take 2 tablets (50 mg total) by mouth 2 (two) times a day   pantoprazole (PROTONIX) 40 mg tablet   No No   Sig: TAKE 1 TABLET (40 MG TOTAL) BY MOUTH DAILY   Patient not taking:  Reported on 12/5/2023   predniSONE 5 mg tablet   Yes No   Sig: Take 1 tablet (5 mg total) by mouth daily Do not start before December 21, 2023.   predniSONE 50 mg tablet   No No   Sig: Take 1 tablet (50 mg total) by mouth daily for 4 days      Facility-Administered Medications: None       Past Medical History:   Diagnosis Date    Acute metabolic encephalopathy 06/13/2020    Acute on chronic respiratory failure (HCC) 12/4/2022    Age-related cataract of right eye 4/4/2023    patient is medically cleared and presents with low risk of complication with this upcoming R cataract surgery.    Anemia     Aneurysm, aorta, thoracic (HCC)     Appendicolith     Ascending aortic aneurysm (HCC)     3.7    Asthma     BPH (benign prostatic hyperplasia)     CAD (coronary artery disease)     noted on CT scan    CHF (congestive heart failure) (HCC)     COPD (chronic obstructive pulmonary disease) (HCC)     Descending thoracic aortic aneurysm (HCC)     Diabetes mellitus (HCC)     Diverticulosis     Former tobacco use     GERD (gastroesophageal reflux disease)     History of DVT (deep vein thrombosis)     Left leg    History of transfusion     Hypertension     Inguinal hernia     right    Nephrolithiasis     Oxygen dependent     2LNC    Oxygen dependent     Pneumonia     Pre-diabetes     Prostate calculus     PVD (peripheral vascular disease) (HCC)     Recurrent right inguinal hernia w incarcertion 1/4/2023    Small bowel obstruction (HCC) 12/28/2022    Thoracic aortic aneurysm without rupture (HCC) 11/19/2018    Added automatically from request for surgery [id number]    Thrombocytopenia (HCC) 12/29/2018    Ulcer     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)     Varicose vein of leg     b/l       Past Surgical History:   Procedure Laterality Date    CARDIAC SURGERY      ESOPHAGOGASTRODUODENOSCOPY      HERNIA REPAIR Right 1/21/2019    Procedure: REPAIR HERNIA INGUINAL WITH MESH;  Surgeon: David Lowry MD;  Location: [facility]  OR;  Service: General    HERNIA REPAIR Right 2023    Procedure: REPAIR RECURRENT INCARERATED HERNIA INGUINAL OPEN, RIGHT ORCHIECTOMY;  Surgeon: Mason eBrtrand MD;  Location: [facility];  Service: General    INGUINAL HERNIA REPAIR Bilateral     IR TEVAR  2018    ID EVASC RPR DTA COVERAGE ART ORIGIN 1ST ENDOPROSTH N/A 2018    Procedure: TEVAR - endovascular thoracic aortic aneurysm repair;  Surgeon: Gladis Ortega MD;  Location: [facility];  Service: Vascular    THORACIC AORTIC ANEURYSM REPAIR  2018    VARICOSE VEIN SURGERY Bilateral     vein stripping       Family History   Problem Relation Age of Onset    Tuberculosis Mother     No Known Problems Father     Cancer Sister     Diabetes Family     Hypertension Family      I have reviewed and agree with the history as documented.    E-Cigarette/Vaping    E-Cigarette Use Never User      E-Cigarette/Vaping Substances     Social History     Tobacco Use    Smoking status: Former     Current packs/day: 0.00     Average packs/day: 1 pack/day for 35.0 years (35.0 ttl pk-yrs)     Types: Cigarettes     Start date:      Quit date:      Years since quittin.0    Smokeless tobacco: Never   Vaping Use    Vaping status: Never Used   Substance Use Topics    Alcohol use: Never    Drug use: No       Review of Systems   Unable to perform ROS: Mental status change       Physical Exam  Physical Exam  Vitals reviewed.   Constitutional:       General: He is not in acute distress.     Appearance: Normal appearance. He is well-developed and well-groomed. He is ill-appearing. He is not toxic-appearing or diaphoretic.   HENT:      Head: Normocephalic and atraumatic.      Right Ear: External ear normal.      Left Ear: External ear normal.      Nose: Nose normal. No congestion or rhinorrhea.      Mouth/Throat:      Mouth: Mucous membranes are moist.      Pharynx: Oropharynx is clear. No oropharyngeal exudate or posterior oropharyngeal erythema.   Eyes:       General: No scleral icterus.        Right eye: No discharge.         Left eye: No discharge.      Extraocular Movements: Extraocular movements intact.      Conjunctiva/sclera: Conjunctivae normal.   Cardiovascular:      Rate and Rhythm: Normal rate and regular rhythm.      Pulses: Normal pulses.      Heart sounds: No murmur heard.     No friction rub. No gallop.   Pulmonary:      Effort: Pulmonary effort is normal. No respiratory distress.      Breath sounds: Decreased breath sounds present. No wheezing, rhonchi or rales.   Abdominal:      General: Abdomen is flat. There is no distension.      Palpations: Abdomen is soft.      Tenderness: There is no abdominal tenderness. There is no right CVA tenderness, left CVA tenderness, guarding or rebound.   Musculoskeletal:         General: No deformity. Normal range of motion.      Cervical back: Normal range of motion and neck supple.   Skin:     General: Skin is warm and dry.      Capillary Refill: Capillary refill takes less than 2 seconds.      Coloration: Skin is pale. Skin is not jaundiced.      Findings: No rash.   Neurological:      General: No focal deficit present.      Mental Status: He is alert. He is disoriented.      GCS: GCS eye subscore is 4. GCS verbal subscore is 5. GCS motor subscore is 6.      Cranial Nerves: Cranial nerves 2-12 are intact.      Motor: Motor function is intact.         Vital Signs  ED Triage Vitals   Temperature Pulse Respirations Blood Pressure SpO2   01/01/24 1936 01/01/24 1936 01/01/24 1936 01/01/24 1936 01/01/24 1930   98.3 °F (36.8 °C) 80 (!) 28 158/81 97 %      Temp Source Heart Rate Source Patient Position - Orthostatic VS BP Location FiO2 (%)   01/02/24 0405 01/01/24 1936 01/01/24 2054 01/01/24 2054 --   Axillary Monitor Sitting Right arm       Pain Score       01/02/24 0405       No Pain           Vitals:    01/02/24 0100 01/02/24 0200 01/02/24 0300 01/02/24 0405   BP: 133/63 146/66 (!) 171/79 (!) 178/81   Pulse: 64 62 63 80    Patient Position - Orthostatic VS: Lying Lying Lying Lying         Visual Acuity  Visual Acuity      Flowsheet Row Most Recent Value   L Pupil Size (mm) 3   R Pupil Size (mm) 4   L Pupil Shape Round   R Pupil Shape Round            ED Medications  Medications   budesonide (PULMICORT) inhalation solution 0.5 mg (has no administration in time range)   pantoprazole (PROTONIX) EC tablet 40 mg (has no administration in time range)   levalbuterol (XOPENEX) inhalation solution 1.25 mg (1.25 mg Nebulization Given 1/2/24 0359)   methylPREDNISolone sodium succinate (Solu-MEDROL) injection 60 mg (has no administration in time range)   chlorhexidine (PERIDEX) 0.12 % oral rinse 15 mL (has no administration in time range)   heparin (porcine) subcutaneous injection 5,000 Units (has no administration in time range)   azithromycin (ZITHROMAX) 500 mg in sodium chloride 0.9 % 250 mL IVPB (has no administration in time range)   losartan (COZAAR) tablet 50 mg (has no administration in time range)   albuterol inhalation solution 10 mg (10 mg Nebulization Given 1/1/24 2042)   ipratropium (ATROVENT) 0.02 % inhalation solution 1 mg (1 mg Nebulization Given 1/1/24 2042)   sodium chloride 0.9 % inhalation solution 12 mL (12 mL Nebulization Given 1/1/24 2042)   LORazepam (ATIVAN) injection 0.5 mg (0.5 mg Intravenous Given 1/1/24 2139)   LORazepam (ATIVAN) injection 1 mg (1 mg Intravenous Given 1/1/24 2250)       Diagnostic Studies  Results Reviewed       Procedure Component Value Units Date/Time    COVID/FLU/RSV [[id number]]  (Normal) Collected: 01/02/24 0434    Lab Status: Final result Specimen: Nares from Nose Updated: 01/02/24 0538     SARS-CoV-2 Negative     INFLUENZA A PCR Negative     INFLUENZA B PCR Negative     RSV PCR Negative    Narrative:      FOR PEDIATRIC PATIENTS - copy/paste COVID Guidelines URL to browser: [email or web address]    SARS-CoV-2 assay is a Nucleic Acid  Amplification assay intended for the  qualitative detection of nucleic acid from SARS-CoV-2 in nasopharyngeal  swabs. Results are for the presumptive identification of SARS-CoV-2 RNA.    Positive results are indicative of infection with SARS-CoV-2, the virus  causing COVID-19, but do not rule out bacterial infection or co-infection  with other viruses. Laboratories within the [place] and its  territories are required to report all positive results to the appropriate  public health authorities. Negative results do not preclude SARS-CoV-2  infection and should not be used as the sole basis for treatment or other  patient management decisions. Negative results must be combined with  clinical observations, patient history, and epidemiological information.  This test has not been FDA cleared or approved.    This test has been authorized by FDA under an Emergency Use Authorization  (EUA). This test is only authorized for the duration of time the  declaration that circumstances exist justifying the authorization of the  emergency use of an in vitro diagnostic tests for detection of SARS-CoV-2  virus and/or diagnosis of COVID-19 infection under section 564(b)(1) of  the Act, 21 U.S.C. 360bbb-3(b)(1), unless the authorization is terminated  or revoked sooner. The test has been validated but independent review by FDA  and CLIA is pending.    Test performed using [employer] GeneXpert: This RT-PCR assay targets N2,  a region unique to SARS-CoV-2. A conserved region in the E-gene was chosen  for pan-Sarbecovirus detection which includes SARS-CoV-2.    According to CMS-2020-01-R, this platform meets the definition of high-throughput technology.    CBC and differential [[id number]]     Lab Status: No result Specimen: Blood     Comprehensive metabolic panel [[id number]]     Lab Status: No result Specimen: Blood     Magnesium [[id number]]     Lab Status: No result Specimen: Blood     Phosphorus [[id number]]     Lab Status: No result  Specimen: Blood     Calcium, ionized [[id number]]     Lab Status: No result Specimen: Blood     Procalcitonin [[id number]]     Lab Status: No result Specimen: Blood     Platelet count [[id number]]     Lab Status: No result Specimen: Blood     HS Troponin I 4hr [[id number]]  (Normal) Collected: 01/02/24 0106    Lab Status: Final result Specimen: Blood from Arm, Right Updated: 01/02/24 0141     hs TnI 4hr 9 ng/L      Delta 4hr hsTnI -2 ng/L     Blood gas, arterial [[id number]]  (Abnormal) Collected: 01/02/24 0110    Lab Status: Final result Specimen: Blood, Arterial from Radial, Right Updated: 01/02/24 0125     pH, Arterial 7.303     pCO2, Arterial 100.9 mm Hg      pO2, Arterial 88.0 mm Hg      HCO3, Arterial 48.9 mmol/L      Base Excess, Arterial 18.0 mmol/L      O2 Content, Arterial 16.3 mL/dL      O2 HGB,Arterial  95.1 %      SOURCE Radial, Right     AVINASH TEST Yes     Non Vent type BIPAP BIPAP     IPAP 16     EPAP 6     BIPAP fio2 40 %     Blood gas, arterial [[id number]]  (Abnormal) Collected: 01/01/24 2343    Lab Status: Final result Specimen: Blood, Arterial from Radial, Right Updated: 01/01/24 2354     pH, Arterial 7.331     pCO2, Arterial 89.5 mm Hg      pO2, Arterial 87.9 mm Hg      HCO3, Arterial 46.2 mmol/L      Base Excess, Arterial 16.3 mmol/L      O2 Content, Arterial 16.5 mL/dL      O2 HGB,Arterial  95.5 %      SOURCE Radial, Right     AVINASH TEST Yes     Non Vent type BIPAP BIPAP     IPAP 12     EPAP 6     BIPAP fio2 40 %     HS Troponin I 2hr [[id number]]  (Normal) Collected: 01/01/24 2300    Lab Status: Final result Specimen: Blood from Arm, Right Updated: 01/01/24 2335     hs TnI 2hr 10 ng/L      Delta 2hr hsTnI -1 ng/L     Blood gas, venous [[id number]]  (Abnormal) Collected: 01/01/24 2300    Lab Status: Final result Specimen: Blood from Arm, Right Updated: 01/01/24 2313     pH, Nabeel 7.317     pCO2, Nabeel 101.0 mm Hg      pO2, Nabeel 47.3 mm Hg      HCO3, Nabeel 50.5 mmol/L      Base Excess, Nabeel 19.3 mmol/L      O2  Content, Nabeel 14.5 ml/dL      O2 HGB, VENOUS 79.8 %     Urine Microscopic [[id number]]  (Abnormal) Collected: 01/01/24 2153    Lab Status: Final result Specimen: Urine, Clean Catch Updated: 01/01/24 2207     RBC, UA 1-2 /hpf      WBC, UA 1-2 /hpf      Epithelial Cells Occasional /hpf      Bacteria, UA Occasional /hpf      MUCUS THREADS Occasional     Amorphous Crystals, UA Occasional    UA w Reflex to Microscopic w Reflex to Culture [[id number]]  (Abnormal) Collected: 01/01/24 2153    Lab Status: Final result Specimen: Urine, Clean Catch Updated: 01/01/24 2205     Color, UA Yellow     Clarity, UA Turbid     Specific Gravity, UA 1.021     pH, UA 7.0     Leukocytes, UA Negative     Nitrite, UA Negative     Protein, UA Trace mg/dl      Glucose, UA 1000 (1%) mg/dl      Ketones, UA Negative mg/dl      Urobilinogen, UA <2.0 mg/dl      Bilirubin, UA Negative     Occult Blood, UA Negative    Potassium [[id number]]  (Normal) Collected: 01/01/24 2131    Lab Status: Final result Specimen: Blood from Arm, Right Updated: 01/01/24 2156     Potassium 4.5 mmol/L     TSH, 3rd generation with Free T4 reflex [[id number]]  (Abnormal) Collected: 01/01/24 2050    Lab Status: Final result Specimen: Blood from Arm, Right Updated: 01/01/24 2131     TSH 3RD GENERATON 0.091 uIU/mL     T4, free [[id number]] Collected: 01/01/24 2050    Lab Status: In process Specimen: Blood from Arm, Right Updated: 01/01/24 2130    Blood culture #2 [[id number]] Collected: 01/01/24 2125    Lab Status: In process Specimen: Blood from Arm, Left Updated: 01/01/24 2129    Comprehensive metabolic panel [[id number]]  (Abnormal) Collected: 01/01/24 2050    Lab Status: Final result Specimen: Blood from Arm, Right Updated: 01/01/24 2125     Sodium 142 mmol/L      Potassium 6.3 mmol/L      Chloride 95 mmol/L      CO2 >45 mmol/L      ANION GAP --     BUN 27 mg/dL      Creatinine 0.85 mg/dL      Glucose 300 mg/dL      Calcium 9.9 mg/dL      AST 26 U/L      ALT 28 U/L      Alkaline  Phosphatase 80 U/L      Total Protein 7.0 g/dL      Albumin 3.7 g/dL      Total Bilirubin 0.35 mg/dL      eGFR 78 ml/min/1.73sq m     Narrative:      National Kidney Disease Foundation guidelines for Chronic Kidney Disease (CKD):     Stage 1 with normal or high GFR (GFR > 90 mL/min/1.73 square meters)    Stage 2 Mild CKD (GFR = 60-89 mL/min/1.73 square meters)    Stage 3A Moderate CKD (GFR = 45-59 mL/min/1.73 square meters)    Stage 3B Moderate CKD (GFR = 30-44 mL/min/1.73 square meters)    Stage 4 Severe CKD (GFR = 15-29 mL/min/1.73 square meters)    Stage 5 End Stage CKD (GFR <15 mL/min/1.73 square meters)  Note: GFR calculation is accurate only with a steady state creatinine    B-Type Natriuretic Peptide(BNP) [[id number]]  (Normal) Collected: 01/01/24 2050    Lab Status: Final result Specimen: Blood from Arm, Right Updated: 01/01/24 2122     BNP 48 pg/mL     HS Troponin 0hr (reflex protocol) [[id number]]  (Normal) Collected: 01/01/24 2050    Lab Status: Final result Specimen: Blood from Arm, Right Updated: 01/01/24 2122     hs TnI 0hr 11 ng/L     Lactic acid, plasma (w/reflex if result > 2.0) [[id number]]  (Normal) Collected: 01/01/24 2050    Lab Status: Final result Specimen: Blood from Arm, Right Updated: 01/01/24 2117     LACTIC ACID 1.2 mmol/L     Narrative:      Result may be elevated if tourniquet was used during collection.    CBC and differential [[id number]]  (Abnormal) Collected: 01/01/24 2050    Lab Status: Final result Specimen: Blood from Arm, Right Updated: 01/01/24 2100     WBC 4.23 Thousand/uL      RBC 3.92 Million/uL      Hemoglobin 12.5 g/dL      Hematocrit 43.4 %       fL      MCH 31.9 pg      MCHC 28.8 g/dL      RDW 12.8 %      MPV 9.3 fL      Platelets 122 Thousands/uL      nRBC 0 /100 WBCs      Neutrophils Relative 86 %      Immat GRANS % 1 %      Lymphocytes Relative 11 %      Monocytes Relative 2 %      Eosinophils Relative 0 %      Basophils Relative 0 %      Neutrophils Absolute 3.64  Thousands/µL      Immature Grans Absolute 0.02 Thousand/uL      Lymphocytes Absolute 0.48 Thousands/µL      Monocytes Absolute 0.07 Thousand/µL      Eosinophils Absolute 0.01 Thousand/µL      Basophils Absolute 0.01 Thousands/µL     Blood gas, venous [[id number]]  (Abnormal) Collected: 01/01/24 2050    Lab Status: Final result Specimen: Blood from Arm, Right Updated: 01/01/24 2100     pH, Nabeel 7.282     pCO2, Nabeel 97.0 mm Hg      pO2, Nabeel 28.7 mm Hg      HCO3, Nabeel 44.8 mmol/L      Base Excess, Nabeel 13.7 mmol/L      O2 Content, Nabeel 9.1 ml/dL      O2 HGB, VENOUS 48.5 %     Blood culture #1 [[id number]] Collected: 01/01/24 2050    Lab Status: In process Specimen: Blood from Arm, Right Updated: 01/01/24 2057                   XR chest 1 view portable    (Results Pending)              Procedures  ECG 12 Lead Documentation Only    Date/Time: 1/1/2024 9:35 PM    Performed by: Kimberlee Ortiz PA-C  Authorized by: Kimberlee Ortiz PA-C    Indications / Diagnosis:  Confusion/SOB  ECG reviewed by me, the ED Provider: yes    Patient location:  ED  Previous ECG:     Previous ECG:  Compared to current    Comparison to cardiac monitor: No    Interpretation:     Interpretation: normal    Quality:     Tracing quality:  Limited by artifact  Rate:     ECG rate:  94    ECG rate assessment: normal    Rhythm:     Rhythm: sinus rhythm    Ectopy:     Ectopy: none    QRS:     QRS axis:  Normal    QRS intervals:  Normal  Conduction:     Conduction: normal    ST segments:     ST segments:  Normal  T waves:     T waves: normal    ECG 12 Lead Documentation Only    Date/Time: 1/1/2024 11:10 PM    Performed by: Kimberlee Ortiz PA-C  Authorized by: Kimberlee Ortiz PA-C    Indications / Diagnosis:  Delta troponin  ECG reviewed by me, the ED Provider: yes    Patient location:  ED  Previous ECG:     Previous ECG:  Compared to current    Comparison to cardiac monitor: No    Interpretation:     Interpretation: abnormal    Quality:     Tracing quality:   Limited by artifact  Rate:     ECG rate:  86    ECG rate assessment: normal    Rhythm:     Rhythm: sinus rhythm    Ectopy:     Ectopy: PAC    QRS:     QRS axis:  Normal    QRS intervals:  Normal  Conduction:     Conduction: normal    ST segments:     ST segments:  Normal  T waves:     T waves: normal             ED Course  ED Course as of 01/02/24 0551   Mon Jan 01, 2024 2126 CO2(!!): >45   2126 Potassium(!!): 6.3  Will repeat K   2159 Potassium: 4.5   2214 pH, Nabeel(!): 7.282   2217 pCO2, Nabeel(!): 97.0   2217 pO2, Nabeel(!): 28.7   2217 HCO3, Nabeel(!): 44.8   2319 pCO2, Nabeel(!): 101.0   2319 pO2, Nabeel(!): 47.3   2319 pH, Nabeel: 7.317   2327 TT critical care regarding patient   2346 Delta 2hr hsTnI: -1   2356 pH, Arterial(!): 7.331   2358 pCO2, Arterial(!!): 89.5   2358 pO2, Arterial: 87.9   2358 HCO3, Arterial(!): 46.2   Tue Jan 02, 2024   0005 Discussed with CC. Will recheck ABG in 1 hour. RT to increase his IPAP and rate   0144 pCO2, Arterial(!!): 100.9  TT to critical care, will admit to SD1                                             Medical Decision Making      DDx including but not limited to: metabolic abnormality, intracranial etiology, cardiac etiology, substance abuse, infectious etiology including UTI, thyroid disease, hyperammonemia, delirium, dementia, overmedication.     Presents for evaluation of confusion and shortness of breath.  He was alert and oriented to self and place while in the ED. He is awake and talking. He was hypoxic on arrival, chronically on O2 son says 2-3L. Here was on 6L upon arrival with normal sat.   Will check CBC for evaluation of anemia and infection, CMP for evaluation of LFTs, kidney function and electrolyte abnormalities, troponin for evaluation of heart strain, EKG for evaluation of ACS, BNP for HF.  Will order lactic acid and VBG for metabolic abnormalities.  CXR for acute cardiopulmonary abnormalities.    Will order BiPAP for hypercarbia on VBG of pCO2 97, pO2 was 28.7 and ph was  7.28 initially.  Will repeat 1 hour after BiPAP.  K of 6.3, will order repeat potassium as initial was hemolyzed.  Patient continues to pull at BiPAP, will order Ativan for anxiety.  Family bedside states the patient is feeling claustrophobic.    Second VBG his pCO2 was 101 but pO2 improved to 44.8 and ph is now 7.31.  Discussed with critical care.  Will order ABG.    ABG w/ ph 7.3, pCO2 89.5, pO2 87.9.  Respiratory to adjust BiPAP.  Will redraw ABG in about 1 hour.    ABG: CO 2 is 100, CC to admit to SD1.    At the time of admission, the patient is in no acute distress. I discussed with the patient and family the diagnosis, treatment plan, and plan for admission; family was given the opportunity to ask questions and verbalized understanding. The patient agrees with the plan of care and admission at this time.    Problems Addressed:  Acute on chronic respiratory failure with hypercapnia (HCC): acute illness or injury  Altered mental status: acute illness or injury    Amount and/or Complexity of Data Reviewed  Independent Historian: spouse     Details: son  Labs: ordered. Decision-making details documented in ED Course.  Radiology: ordered.    Risk  Prescription drug management.  Decision regarding hospitalization.             Disposition  Final diagnoses:   Acute on chronic respiratory failure with hypercapnia (HCC)   Altered mental status     Time reflects when diagnosis was documented in both MDM as applicable and the Disposition within this note       Time User Action Codes Description Comment    1/2/2024  1:50 AM Kimberlee Ortiz Add [J96.22] Acute on chronic respiratory failure with hypercapnia (HCC)     1/2/2024  1:50 AM Kimberlee Ortiz Add [R41.82] Altered mental status           ED Disposition       ED Disposition   Admit    Condition   Stable    Date/Time   Tue Jan 2, 2024  1:51 AM    Comment   Case was discussed with Critical Care and the patient's admission status was agreed to be Admission Status:  inpatient status to the service of Dr. Hayward.               Follow-up Information    None         Current Discharge Medication List        CONTINUE these medications which have NOT CHANGED    Details   albuterol (2.5 mg/3 mL) 0.083 % nebulizer solution TAKE 3 ML (2.5 MG TOTAL) BY NEBULIZATION EVERY 6 (SIX) HOURS AS NEEDED FOR WHEEZING OR SHORTNESS OF BREATH  Qty: 75 mL, Refills: 3    Associated Diagnoses: Chronic obstructive pulmonary disease, unspecified COPD type (HCC)      albuterol (PROVENTIL HFA,VENTOLIN HFA) 90 mcg/act inhaler INHALE 2 PUFFS EVERY 6 (SIX) HOURS AS NEEDED FOR WHEEZING  Qty: 18 g, Refills: 5    Comments: Substitution to a formulary equivalent within the same pharmaceutical class is authorized.  Associated Diagnoses: Chronic obstructive pulmonary disease with acute exacerbation (HCC)      budesonide (Pulmicort) 0.5 mg/2 mL nebulizer solution Take 2 mL (0.5 mg total) by nebulization 2 (two) times a day Rinse mouth after use.  Qty: 120 mL, Refills: 5    Associated Diagnoses: Chronic obstructive pulmonary disease, unspecified COPD type (HCC)      carbamide peroxide (DEBROX) 6.5 % otic solution Administer 5 drops into ears 2 (two) times a day  Qty: 15 mL, Refills: 0    Associated Diagnoses: Cerumen impaction      formoterol (PERFOROMIST) 20 MCG/2ML nebulizer solution Take 2 mL (20 mcg total) by nebulization 2 (two) times a day  Qty: 120 mL, Refills: 30    Associated Diagnoses: Chronic obstructive pulmonary disease, unspecified COPD type (HCC)      losartan (COZAAR) 25 mg tablet Take 2 tablets (50 mg total) by mouth 2 (two) times a day  Qty: 180 tablet, Refills: 1    Associated Diagnoses: Benign essential hypertension      pantoprazole (PROTONIX) 40 mg tablet TAKE 1 TABLET (40 MG TOTAL) BY MOUTH DAILY  Qty: 30 tablet, Refills: 5    Associated Diagnoses: Chronic gastritis without bleeding, unspecified gastritis type      predniSONE 5 mg tablet Take 1 tablet (5 mg total) by mouth daily Do not start  before December 21, 2023.  Qty: 30 tablet, Refills: 0    Associated Diagnoses: Chronic obstructive pulmonary disease, unspecified COPD type (HCC)             No discharge procedures on file.    PDMP Review         Value Time User    PDMP Reviewed  Yes 7/28/2023 10:27 AM Kirby Casanova MD            ED Provider  Electronically Signed by Kimberlee Ortiz PA-C  01/02/24 0551

## 2024-01-02 NOTE — QUICK NOTE
At the registered nurse request I examined patient and found him on BiPAP, confused pulling at his oxygen sensor.  He is Estonian-speaking only.  Auscultation of his chest revealed that the patient does not moving air well.  His lung sounds are distant and diminished at the bases.  However, his saturation of peripheral oxygen is 99% on the BiPAP.  I recommended that we continue BiPAP for now and possibly check a ABG.

## 2024-01-02 NOTE — PROGRESS NOTES
Pastoral Care Progress Note    2024  Patient: Severiano Feliciano : 1935  Admission Date & Time: 2024  MRN: [id number] CSN: [id number]    Visited with wife of patient edwina was at bedside provided listening upport and pastoral care presence.

## 2024-01-02 NOTE — PLAN OF CARE
Problem: Potential for Falls  Goal: Patient will remain free of falls  Description: INTERVENTIONS:  - Educate patient/family on patient safety including physical limitations  - Instruct patient to call for assistance with activity   - Consult OT/PT to assist with strengthening/mobility   - Keep Call bell within reach  - Keep bed low and locked with side rails adjusted as appropriate  - Keep care items and personal belongings within reach  - Initiate and maintain comfort rounds  - Make Fall Risk Sign visible to staff  - Offer Toileting every 2 Hours, in advance of need  - Initiate/Maintain bed alarm  - Obtain necessary fall risk management equipment: call bell, side rails bed alarm.  - Apply yellow socks and bracelet for high fall risk patients  - Consider moving patient to room near nurses station  Outcome: Progressing     Problem: PAIN - ADULT  Goal: Verbalizes/displays adequate comfort level or baseline comfort level  Description: Interventions:  - Encourage patient to monitor pain and request assistance  - Assess pain using appropriate pain scale  - Administer analgesics based on type and severity of pain and evaluate response  - Implement non-pharmacological measures as appropriate and evaluate response  - Consider cultural and social influences on pain and pain management  - Notify physician/advanced practitioner if interventions unsuccessful or patient reports new pain  Outcome: Progressing     Problem: INFECTION - ADULT  Goal: Absence or prevention of progression during hospitalization  Description: INTERVENTIONS:  - Assess and monitor for signs and symptoms of infection  - Monitor lab/diagnostic results  - Monitor all insertion sites, i.e. indwelling lines, tubes, and drains  - Monitor endotracheal if appropriate and nasal secretions for changes in amount and color  - [place] appropriate cooling/warming therapies per order  - Administer medications as ordered  - Instruct and encourage patient and family  to use good hand hygiene technique  - Identify and instruct in appropriate isolation precautions for identified infection/condition  Outcome: Progressing  Goal: Absence of fever/infection during neutropenic period  Description: INTERVENTIONS:  - Monitor WBC    Outcome: Progressing     Problem: SAFETY ADULT  Goal: Patient will remain free of falls  Description: INTERVENTIONS:  - Educate patient/family on patient safety including physical limitations  - Instruct patient to call for assistance with activity   - Consult OT/PT to assist with strengthening/mobility   - Keep Call bell within reach  - Keep bed low and locked with side rails adjusted as appropriate  - Keep care items and personal belongings within reach  - Initiate and maintain comfort rounds  - Make Fall Risk Sign visible to staff  - Offer Toileting every 2 Hours, in advance of need  - Initiate/Maintain bed alarm  - Obtain necessary fall risk management equipment: see above.  - Apply yellow socks and bracelet for high fall risk patients  - Consider moving patient to room near nurses station  Outcome: Progressing  Goal: Maintain or return to baseline ADL function  Description: INTERVENTIONS:  -  Assess patient's ability to carry out ADLs; assess patient's baseline for ADL function and identify physical deficits which impact ability to perform ADLs (bathing, care of mouth/teeth, toileting, grooming, dressing, etc.)  - Assess/evaluate cause of self-care deficits   - Assess range of motion  - Assess patient's mobility; develop plan if impaired  - Assess patient's need for assistive devices and provide as appropriate  - Encourage maximum independence but intervene and supervise when necessary  - Involve family in performance of ADLs  - Assess for home care needs following discharge   - Consider OT consult to assist with ADL evaluation and planning for discharge  - Provide patient education as appropriate  Outcome: Progressing  Goal: Maintains/Returns to pre  admission functional level  Description: INTERVENTIONS:  - Perform AM-PAC 6 Click Basic Mobility/ Daily Activity assessment daily.  - Set and communicate daily mobility goal to care team and patient/family/caregiver.   - Collaborate with rehabilitation services on mobility goals if consulted  - Perform Range of Motion 4 times a day.  - Reposition patient every 2 hours.  - Dangle patient 2 times a day  - Stand patient 2 times a day  - Ambulate patient 2 times a day  - Out of bed to chair 2 times a day   - Out of bed for meals 2 times a day  - Out of bed for toileting  - Record patient progress and toleration of activity level   Outcome: Progressing     Problem: DISCHARGE PLANNING  Goal: Discharge to home or other facility with appropriate resources  Description: INTERVENTIONS:  - Identify barriers to discharge w/patient and caregiver  - Arrange for needed discharge resources and transportation as appropriate  - Identify discharge learning needs (meds, wound care, etc.)  - Arrange for interpretive services to assist at discharge as needed  - Refer to Case Management Department for coordinating discharge planning if the patient needs post-hospital services based on physician/advanced practitioner order or complex needs related to functional status, cognitive ability, or social support system  Outcome: Progressing     Problem: Knowledge Deficit  Goal: Patient/family/caregiver demonstrates understanding of disease process, treatment plan, medications, and discharge instructions  Description: Complete learning assessment and assess knowledge base.  Interventions:  - Provide teaching at level of understanding  - Provide teaching via preferred learning methods  Outcome: Progressing     Problem: RESPIRATORY - ADULT  Goal: Achieves optimal ventilation and oxygenation  Description: INTERVENTIONS:  - Assess for changes in respiratory status  - Assess for changes in mentation and behavior  - Position to facilitate oxygenation  and minimize respiratory effort  - Oxygen administered by appropriate delivery if ordered  - Initiate smoking cessation education as indicated  - Encourage broncho-pulmonary hygiene including cough, deep breathe, Incentive Spirometry  - Assess the need for suctioning and aspirate as needed  - Assess and instruct to report SOB or any respiratory difficulty  - Respiratory Therapy support as indicated  Outcome: Progressing     Problem: Prexisting or High Potential for Compromised Skin Integrity  Goal: Skin integrity is maintained or improved  Description: INTERVENTIONS:  - Identify patients at risk for skin breakdown  - Assess and monitor skin integrity  - Assess and monitor nutrition and hydration status  - Monitor labs   - Assess for incontinence   - Turn and reposition patient  - Assist with mobility/ambulation  - Relieve pressure over bony prominences  - Avoid friction and shearing  - Provide appropriate hygiene as needed including keeping skin clean and dry  - Evaluate need for skin moisturizer/barrier cream  - Collaborate with interdisciplinary team   - Patient/family teaching  - Consider wound care consult   Outcome: Progressing     Problem: METABOLIC, FLUID AND ELECTROLYTES - ADULT  Goal: Electrolytes maintained within normal limits  Description: INTERVENTIONS:  - Monitor labs and assess patient for signs and symptoms of electrolyte imbalances  - Administer electrolyte replacement as ordered  - Monitor response to electrolyte replacements, including repeat lab results as appropriate  - Instruct patient on fluid and nutrition as appropriate  Outcome: Progressing  Goal: Fluid balance maintained  Description: INTERVENTIONS:  - Monitor labs   - Monitor I/O and WT  - Instruct patient on fluid and nutrition as appropriate  - Assess for signs & symptoms of volume excess or deficit  Outcome: Progressing     Problem: HEMATOLOGIC - ADULT  Goal: Maintains hematologic stability  Description: INTERVENTIONS  - Assess for  signs and symptoms of bleeding or hemorrhage  - Monitor labs  - Administer supportive blood products/factors as ordered and appropriate  Outcome: Progressing

## 2024-01-02 NOTE — OCCUPATIONAL THERAPY NOTE
Occupational Therapy         Patient Name: Severiano Feliciano  Today's Date: 1/2/2024        MD's orders received. Per medical rounds, pt not appropriate for tx intervention 2* respiratory issues. Will continue when medically appropriate. Corona Richardson

## 2024-01-02 NOTE — PHYSICAL THERAPY NOTE
PT Cancellation Note    MD's orders received. Per medical rounds, pt not appropriate for tx intervention 2* respiratory issues. Will continue when medically appropriate     Nathalia Benito

## 2024-01-02 NOTE — ASSESSMENT & PLAN NOTE
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) WRITTEN ON 1/2/2024  3:22 AM BY RAQUEL JOHNSON    2/2 COPD with acute exacerbation  Pt chronically wears 2-3L oxygen at home  Appears to be chronically hypercapnic, but pH not compensated  Pt presented with similar symptoms in Dec 2023 and ultimately failed bipap and was intubated  D/W wife at bedside-she would want patient to be intubated again if needed  Placed on bipap in ED, was initially agitated and pulling off bipap and given 2 doses of ativan which may now be contributing to somnolence and hypercarbia  Would hold all sedating medications  Will titrate up IPAP to help improve MV to help hypercarbia  Continue steroids, nebs  Azithro for COPD exacerbation  Will check for Covid/flu/RSV

## 2024-01-02 NOTE — PROGRESS NOTES
01/02/24 1200   Clinical Encounter Type   Visited With Patient and family together   Routine Visit Introduction   Continue Visiting Yes   Judaism Encounters   Judaism Needs Prayer

## 2024-01-02 NOTE — ASSESSMENT & PLAN NOTE
Wt Readings from Last 3 Encounters:   12/27/23 64.4 kg (141 lb 14.4 oz)   12/14/23 62.1 kg (137 lb)   12/08/23 61.4 kg (135 lb 5.8 oz)     Appears euvolemic currently  Monitor I/Os, daily weights  Monitor fluid volume status closely

## 2024-01-02 NOTE — PLAN OF CARE
Problem: Potential for Falls  Goal: Patient will remain free of falls  Description: INTERVENTIONS:  - Educate patient/family on patient safety including physical limitations  - Instruct patient to call for assistance with activity   - Consult OT/PT to assist with strengthening/mobility   - Keep Call bell within reach  - Keep bed low and locked with side rails adjusted as appropriate  - Keep care items and personal belongings within reach  - Initiate and maintain comfort rounds  - Make Fall Risk Sign visible to staff  - Offer Toileting every 2 Hours, in advance of need  - Initiate/Maintain bed alarm  - Obtain necessary fall risk management equipment: yellow bracelet     - Apply yellow socks and bracelet for high fall risk patients  - Consider moving patient to room near nurses station  1/2/2024 0642 by Erin Suarez RN  Outcome: Progressing  1/2/2024 0642 by Erin Suarez RN  Outcome: Progressing     Problem: PAIN - ADULT  Goal: Verbalizes/displays adequate comfort level or baseline comfort level  Description: Interventions:  - Encourage patient to monitor pain and request assistance  - Assess pain using appropriate pain scale  - Administer analgesics based on type and severity of pain and evaluate response  - Implement non-pharmacological measures as appropriate and evaluate response  - Consider cultural and social influences on pain and pain management  - Notify physician/advanced practitioner if interventions unsuccessful or patient reports new pain  1/2/2024 0642 by Erin Suarez RN  Outcome: Progressing  1/2/2024 0642 by Erin Suarez RN  Outcome: Progressing     Problem: INFECTION - ADULT  Goal: Absence or prevention of progression during hospitalization  Description: INTERVENTIONS:  - Assess and monitor for signs and symptoms of infection  - Monitor lab/diagnostic results  - Monitor all insertion sites, i.e. indwelling lines, tubes, and drains  - Monitor endotracheal if  appropriate and nasal secretions for changes in amount and color  - [place] appropriate cooling/warming therapies per order  - Administer medications as ordered  - Instruct and encourage patient and family to use good hand hygiene technique  - Identify and instruct in appropriate isolation precautions for identified infection/condition  1/2/2024 0642 by Erin Suarez RN  Outcome: Progressing  1/2/2024 0642 by Erin Suarez RN  Outcome: Progressing  Goal: Absence of fever/infection during neutropenic period  Description: INTERVENTIONS:  - Monitor WBC    1/2/2024 0642 by Erin Suarez RN  Outcome: Progressing  1/2/2024 0642 by Erin Suarez RN  Outcome: Progressing     Problem: SAFETY ADULT  Goal: Patient will remain free of falls  Description: INTERVENTIONS:  - Educate patient/family on patient safety including physical limitations  - Instruct patient to call for assistance with activity   - Consult OT/PT to assist with strengthening/mobility   - Keep Call bell within reach  - Keep bed low and locked with side rails adjusted as appropriate  - Keep care items and personal belongings within reach  - Initiate and maintain comfort rounds  - Make Fall Risk Sign visible to staff  - Offer Toileting every 2 Hours, in advance of need  - Initiate/Maintain bedalarm  - Obtain necessary fall risk management equipment:   - Apply yellow socks and bracelet for high fall risk patients  - Consider moving patient to room near nurses station  1/2/2024 0642 by Erin Suarez RN  Outcome: Progressing  1/2/2024 0642 by Erin Suarez RN  Outcome: Progressing  Goal: Maintain or return to baseline ADL function  Description: INTERVENTIONS:  -  Assess patient's ability to carry out ADLs; assess patient's baseline for ADL function and identify physical deficits which impact ability to perform ADLs (bathing, care of mouth/teeth, toileting, grooming, dressing, etc.)  - Assess/evaluate cause of self-care  deficits   - Assess range of motion  - Assess patient's mobility; develop plan if impaired  - Assess patient's need for assistive devices and provide as appropriate  - Encourage maximum independence but intervene and supervise when necessary  - Involve family in performance of ADLs  - Assess for home care needs following discharge   - Consider OT consult to assist with ADL evaluation and planning for discharge  - Provide patient education as appropriate  1/2/2024 0642 by Erin Suarez RN  Outcome: Progressing  1/2/2024 0642 by Erin Suarez RN  Outcome: Progressing  Goal: Maintains/Returns to pre admission functional level  Description: INTERVENTIONS:  - Perform AM-PAC 6 Click Basic Mobility/ Daily Activity assessment daily.  - Set and communicate daily mobility goal to care team and patient/family/caregiver.   - Collaborate with rehabilitation services on mobility goals if consulted  - Perform Range of Motion 3 times a day.  - Reposition patient every 2 hours.  - Dangle patient 3 times a day  - Stand patient 3 times a day  - Ambulate patient 3 times a day  - Out of bed to chair 3 times a day   - Out of bed for meals 3 times a day  - Out of bed for toileting  - Record patient progress and toleration of activity level   1/2/2024 0642 by Erin Suarez RN  Outcome: Progressing  1/2/2024 0642 by Erin Suarez RN  Outcome: Progressing     Problem: DISCHARGE PLANNING  Goal: Discharge to home or other facility with appropriate resources  Description: INTERVENTIONS:  - Identify barriers to discharge w/patient and caregiver  - Arrange for needed discharge resources and transportation as appropriate  - Identify discharge learning needs (meds, wound care, etc.)  - Arrange for interpretive services to assist at discharge as needed  - Refer to Case Management Department for coordinating discharge planning if the patient needs post-hospital services based on physician/advanced practitioner order or  complex needs related to functional status, cognitive ability, or social support system  1/2/2024 0642 by Erin Suarez RN  Outcome: Progressing  1/2/2024 0642 by Erin Suarez RN  Outcome: Progressing     Problem: Knowledge Deficit  Goal: Patient/family/caregiver demonstrates understanding of disease process, treatment plan, medications, and discharge instructions  Description: Complete learning assessment and assess knowledge base.  Interventions:  - Provide teaching at level of understanding  - Provide teaching via preferred learning methods  1/2/2024 0642 by Erin Suarez RN  Outcome: Progressing  1/2/2024 0642 by Erin Suarez RN  Outcome: Progressing     Problem: RESPIRATORY - ADULT  Goal: Achieves optimal ventilation and oxygenation  Description: INTERVENTIONS:  - Assess for changes in respiratory status  - Assess for changes in mentation and behavior  - Position to facilitate oxygenation and minimize respiratory effort  - Oxygen administered by appropriate delivery if ordered  - Initiate smoking cessation education as indicated  - Encourage broncho-pulmonary hygiene including cough, deep breathe, Incentive Spirometry  - Assess the need for suctioning and aspirate as needed  - Assess and instruct to report SOB or any respiratory difficulty  - Respiratory Therapy support as indicated  1/2/2024 0642 by Erin Suarez RN  Outcome: Progressing  1/2/2024 0642 by Erin Suarez RN  Outcome: Progressing

## 2024-01-02 NOTE — ASSESSMENT & PLAN NOTE
Lab Results   Component Value Date    EGFR 78 01/01/2024    EGFR 78 12/27/2023    EGFR 77 12/08/2023    CREATININE 0.85 01/01/2024    CREATININE 0.87 12/27/2023    CREATININE 0.89 12/08/2023     Renal indices appear to be at baseline  Avoid nephrotoxic agents  Monitor renal indices

## 2024-01-02 NOTE — H&P
[facility]  H&P  Name: Severiano Feliciano 88 y.o. male I MRN: [id number]  Unit/Bed#: ED-28 I Date of Admission: 1/1/2024   Date of Service: 1/2/2024 I Hospital Day: 0      Assessment/Plan   * Acute on chronic respiratory failure with hypoxia and hypercapnia (HCC)  Assessment & Plan  2/2 COPD with acute exacerbation  Pt chronically wears 2-3L oxygen at home  Appears to be chronically hypercapnic, but pH not compensated  Pt presented with similar symptoms in Dec 2023 and ultimately failed bipap and was intubated  D/W wife at bedside-she would want patient to be intubated again if needed  Placed on bipap in ED, was initially agitated and pulling off bipap and given 2 doses of ativan which may now be contributing to somnolence and hypercarbia  Would hold all sedating medications  Will titrate up IPAP to help improve MV to help hypercarbia  Continue steroids, nebs  Azithro for COPD exacerbation  Will check for Covid/flu/RSV    Chronic kidney disease, stage 3a ([facility])  Assessment & Plan  Lab Results   Component Value Date    EGFR 78 01/01/2024    EGFR 78 12/27/2023    EGFR 77 12/08/2023    CREATININE 0.85 01/01/2024    CREATININE 0.87 12/27/2023    CREATININE 0.89 12/08/2023     Renal indices appear to be at baseline  Avoid nephrotoxic agents  Monitor renal indices    Benign essential hypertension  Assessment & Plan  Continue home losartan for now  If unable to take po because remains on bipap will need to transition to IV agents    History of DVT (deep vein thrombosis)  Assessment & Plan  Hx of DVT, not on anticoagulation  SQ DVT ppx heparin for now    Chronic diastolic CHF (congestive heart failure) ([facility])  Assessment & Plan  Wt Readings from Last 3 Encounters:   12/27/23 64.4 kg (141 lb 14.4 oz)   12/14/23 62.1 kg (137 lb)   12/08/23 61.4 kg (135 lb 5.8 oz)     Appears euvolemic currently  Monitor I/Os, daily weights  Monitor fluid volume status closely                 History of Present  Illness     HPI: Severiano Feliciano is a 88 y.o. who presents with SOB. HE has a PMH of oxygen dependent COPD, heart failure, HTN. He was admitted in Dec 2023 for resp failure and was intubated at that time. His wife reports he has been having worsening SOB so she brought him to the ED. In the ED he was noted to be hypercapnic and placed on bipap. He was attempting to remove the bipap so he was given 2 doses of ativan for agitation. Repeat ABGs showed mildly worsening hypercapnia and he was referred to [facility] service for admission.    History obtained from chart review and unobtainable from patient due to mental status.  Review of Systems   Unable to perform ROS: Acuity of condition     Disposition: Stepdown Level 1  Historical Information   Past Medical History:  06/13/2020: Acute metabolic encephalopathy  12/4/2022: Acute on chronic respiratory failure (HCC)  4/4/2023: Age-related cataract of right eye      Comment:  patient is medically cleared and presents with low risk                of complication with this upcoming R cataract surgery.  No date: Anemia  No date: Aneurysm, aorta, thoracic ([facility])  No date: Appendicolith  No date: Ascending aortic aneurysm ([facility])      Comment:  3.7  No date: Asthma  No date: BPH (benign prostatic hyperplasia)  No date: CAD (coronary artery disease)      Comment:  noted on CT scan  No date: CHF (congestive heart failure) ([facility])  No date: COPD (chronic obstructive pulmonary disease) ([facility])  No date: Descending thoracic aortic aneurysm ([facility])  No date: Diabetes mellitus ([facility])  No date: Diverticulosis  No date: Former tobacco use  No date: GERD (gastroesophageal reflux disease)  No date: History of DVT (deep vein thrombosis)      Comment:  Left leg  No date: History of transfusion  No date: Hypertension  No date: Inguinal hernia      Comment:  right  No date: Nephrolithiasis  No date: Oxygen dependent      Comment:  2LNC  No date: Oxygen dependent  No date: Pneumonia  No date:  Pre-diabetes  No date: Prostate calculus  No date: PVD (peripheral vascular disease) ([facility])  1/4/2023: Recurrent right inguinal hernia w incarcertion  12/28/2022: Small bowel obstruction ([facility])  11/19/2018: Thoracic aortic aneurysm without rupture ([facility])      Comment:  Added automatically from request for surgery [id number]  12/29/2018: Thrombocytopenia ([facility])  No date: Ulcer  No date: 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[facility])  No date: Varicose vein of leg      Comment:  b/l Past Surgical History:  No date: CARDIAC SURGERY  No date: ESOPHAGOGASTRODUODENOSCOPY  1/21/2019: HERNIA REPAIR; Right      Comment:  Procedure: REPAIR HERNIA INGUINAL WITH MESH;  Surgeon:                David Lowry MD;  Location: [facility] MAIN OR;  Service:                General  7/22/2023: HERNIA REPAIR; Right      Comment:  Procedure: REPAIR RECURRENT INCARERATED HERNIA INGUINAL                OPEN, RIGHT ORCHIECTOMY;  Surgeon: Mason Bertrand MD;                 Location: [facility];  Service: General  No date: INGUINAL HERNIA REPAIR; Bilateral  12/27/2018: IR TEVAR  12/27/2018: WV EVASC RPR DTA COVERAGE ART ORIGIN 1ST ENDOPROSTH; N/A      Comment:  Procedure: TEVAR - endovascular thoracic aortic aneurysm               repair;  Surgeon: Gladis Ortega MD;  Location: [facility]                OR;  Service: Vascular  12/27/2018: THORACIC AORTIC ANEURYSM REPAIR  No date: VARICOSE VEIN SURGERY; Bilateral      Comment:  vein stripping   Current Outpatient Medications   Medication Instructions    albuterol (PROVENTIL HFA,VENTOLIN HFA) 90 mcg/act inhaler 2 puffs, Inhalation, Every 6 hours PRN    albuterol 2.5 mg, Nebulization, Every 6 hours PRN    budesonide (PULMICORT) 0.5 mg, Nebulization, 2 times daily, Rinse mouth after use.    carbamide peroxide (DEBROX) 6.5 % otic solution 5 drops, Otic, 2 times daily    formoterol (PERFOROMIST) 20 mcg, Nebulization, 2 times daily    losartan (COZAAR) 50 mg, Oral, 2 times daily    pantoprazole (PROTONIX)  40 mg, Oral, Daily    predniSONE 5 mg, Oral, Daily    Allergies   Allergen Reactions    Penicillins Hives, Itching and Rash    Lisinopril Rash     Side pains and rash     Zyprexa [Olanzapine] Tongue Swelling      Social History     Tobacco Use    Smoking status: Former     Current packs/day: 0.00     Average packs/day: 1 pack/day for 35.0 years (35.0 ttl pk-yrs)     Types: Cigarettes     Start date:      Quit date:      Years since quittin.0    Smokeless tobacco: Never   Vaping Use    Vaping status: Never Used   Substance Use Topics    Alcohol use: Never    Drug use: No    Family History   Problem Relation Age of Onset    Tuberculosis Mother     No Known Problems Father     Cancer Sister     Diabetes Family     Hypertension Family           Objective                            Vitals I/O      Most Recent Min/Max in 24hrs   Temp 98.3 °F (36.8 °C) Temp  Min: 98.3 °F (36.8 °C)  Max: 98.3 °F (36.8 °C)   Pulse 63 Pulse  Min: 62  Max: 87   Resp 15 Resp  Min: 14  Max: 28   BP (!) 171/79 BP  Min: 133/63  Max: 194/76   O2 Sat 98 % SpO2  Min: 88 %  Max: 99 %    No intake or output data in the 24 hours ending 24 0329    Diet NPO    Invasive Monitoring           Physical Exam   Physical Exam  Eyes:      Extraocular Movements: Extraocular movements intact.      Pupils: Pupils are equal, round, and reactive to light.   Skin:     General: Skin is dry.      Coloration: Skin is pale.   HENT:      Head: Normocephalic and atraumatic.   Neck:      Vascular: No JVD.   Cardiovascular:      Rate and Rhythm: Normal rate.      Pulses: Normal pulses.   Musculoskeletal:      Right lower leg: No edema.      Left lower leg: No edema.   Abdominal: General: Bowel sounds are normal.      Palpations: Abdomen is soft.   Constitutional:       General: He is not in acute distress.     Appearance: He is ill-appearing.   Pulmonary:      Effort: Pulmonary effort is normal.      Breath sounds: Decreased breath sounds present.    Neurological:      Mental Status: He is lethargic and somnolent.            Diagnostic Studies      EKG: SR  Imaging: no acute abnormalities   I have personally reviewed pertinent films in PACS     Medications:  Scheduled PRN   azithromycin, 500 mg, Q24H  budesonide, 0.5 mg, BID  chlorhexidine, 15 mL, Q12H KIKE  heparin (porcine), 5,000 Units, Q8H KIKE  levalbuterol, 1.25 mg, Q8H  losartan, 50 mg, BID  methylPREDNISolone sodium succinate, 60 mg, Q8H KIKE  pantoprazole, 40 mg, Early Morning          Continuous          Labs:    CBC    Recent Labs     01/01/24 2050   WBC 4.23*   HGB 12.5   HCT 43.4   *     BMP    Recent Labs     01/01/24 2050 01/01/24  2131   SODIUM 142  --    K 6.3* 4.5   CL 95*  --    CO2 >45*  --    BUN 27*  --    CREATININE 0.85  --    CALCIUM 9.9  --        Coags    No recent results     Additional Electrolytes  No recent results       Blood Gas    Recent Labs     01/02/24  0110   PHART 7.303*   SIZ5FLB 100.9*   PO2ART 88.0   YYB6IAW 48.9*   BEART 18.0   SOURCE Radial, Right     Recent Labs     01/01/24  2300 01/01/24  2343 01/02/24  0110   PHVEN 7.317  --   --    RGW0CZR 101.0*  --   --    PO2VEN 47.3*  --   --    PRL7CMU 50.5*  --   --    BEVEN 19.3  --   --    D9OIBOM 79.8  --   --    SOURCE  --    < > Radial, Right    < > = values in this interval not displayed.    LFTs  Recent Labs     01/01/24 2050   ALT 28   AST 26   ALKPHOS 80   ALB 3.7   TBILI 0.35       Infectious  No recent results  Glucose  Recent Labs     01/01/24 2050   GLUC 300*             Critical Care Time Delivered : Upon my evaluation, this patient had a high probability of imminent or life-threatening deterioration due to acute on chronic hypoxic and hypercarbic resp failrue, which required my direct attention, intervention, and personal management.  I have personally provided 35 minutes of critical care time, exclusive of procedures, teaching, family meetings, and any prior time recorded by providers other than  myself.   Anticipated Length of Stay is > 2 midnights  RAQUEL Salmeron

## 2024-01-02 NOTE — ASSESSMENT & PLAN NOTE
Continue home losartan for now  If unable to take po because remains on bipap will need to transition to IV agents

## 2024-01-02 NOTE — PROGRESS NOTES
Received ADT alert patient was admitted to [facility] with acute on chronic respiratory failure.  Will monitor via chart review during hospitalization and outreach patient upon discharge.

## 2024-01-02 NOTE — ASSESSMENT & PLAN NOTE
2/2 COPD with acute exacerbation  Pt chronically wears 2-3L oxygen at home  Appears to be chronically hypercapnic, but pH not compensated  Pt presented with similar symptoms in Dec 2023 and ultimately failed bipap and was intubated  D/W wife at bedside-she would want patient to be intubated again if needed  Placed on bipap in ED, was initially agitated and pulling off bipap and given 2 doses of ativan which may now be contributing to somnolence and hypercarbia  Would hold all sedating medications  Will titrate up IPAP to help improve MV to help hypercarbia  Continue steroids, nebs  Azithro for COPD exacerbation  Will check for Covid/flu/RSV

## 2024-01-03 LAB
BUN SERPL-MCNC: 27 MG/DL (ref 5–25)
CALCIUM SERPL-MCNC: 9.4 MG/DL (ref 8.4–10.2)
CHLORIDE SERPL-SCNC: 99 MMOL/L (ref 96–108)
CO2 SERPL-SCNC: >45 MMOL/L (ref 21–32)
CREAT SERPL-MCNC: 0.72 MG/DL (ref 0.6–1.3)
ERYTHROCYTE [DISTWIDTH] IN BLOOD BY AUTOMATED COUNT: 12.8 % (ref 11.6–15.1)
GFR SERPL CREATININE-BSD FRML MDRD: 84 ML/MIN/1.73SQ M
GLUCOSE SERPL-MCNC: 120 MG/DL (ref 65–140)
HCT VFR BLD AUTO: 35.8 % (ref 36.5–49.3)
HGB BLD-MCNC: 10.8 G/DL (ref 12–17)
MCH RBC QN AUTO: 31.9 PG (ref 26.8–34.3)
MCHC RBC AUTO-ENTMCNC: 30.2 G/DL (ref 31.4–37.4)
MCV RBC AUTO: 106 FL (ref 82–98)
PLATELET # BLD AUTO: 123 THOUSANDS/UL (ref 149–390)
PMV BLD AUTO: 9.4 FL (ref 8.9–12.7)
POTASSIUM SERPL-SCNC: 4.8 MMOL/L (ref 3.5–5.3)
RBC # BLD AUTO: 3.39 MILLION/UL (ref 3.88–5.62)
SODIUM SERPL-SCNC: 147 MMOL/L (ref 135–147)
WBC # BLD AUTO: 5.37 THOUSAND/UL (ref 4.31–10.16)

## 2024-01-03 PROCEDURE — 80048 BASIC METABOLIC PNL TOTAL CA: CPT

## 2024-01-03 PROCEDURE — 85027 COMPLETE CBC AUTOMATED: CPT

## 2024-01-03 PROCEDURE — 94760 N-INVAS EAR/PLS OXIMETRY 1: CPT

## 2024-01-03 PROCEDURE — 99232 SBSQ HOSP IP/OBS MODERATE 35: CPT | Performed by: INTERNAL MEDICINE

## 2024-01-03 PROCEDURE — 97167 OT EVAL HIGH COMPLEX 60 MIN: CPT

## 2024-01-03 PROCEDURE — 94640 AIRWAY INHALATION TREATMENT: CPT

## 2024-01-03 PROCEDURE — C9113 INJ PANTOPRAZOLE SODIUM, VIA: HCPCS

## 2024-01-03 RX ORDER — LORAZEPAM 2 MG/ML
0.5 INJECTION INTRAMUSCULAR ONCE
Status: COMPLETED | OUTPATIENT
Start: 2024-01-03 | End: 2024-01-03

## 2024-01-03 RX ORDER — ENOXAPARIN SODIUM 100 MG/ML
40 INJECTION SUBCUTANEOUS
Status: DISCONTINUED | OUTPATIENT
Start: 2024-01-03 | End: 2024-01-03

## 2024-01-03 RX ORDER — QUETIAPINE FUMARATE 25 MG/1
25 TABLET, FILM COATED ORAL
Status: DISCONTINUED | OUTPATIENT
Start: 2024-01-03 | End: 2024-01-03

## 2024-01-03 RX ORDER — SENNOSIDES 8.6 MG
2 TABLET ORAL
Status: DISCONTINUED | OUTPATIENT
Start: 2024-01-03 | End: 2024-01-03

## 2024-01-03 RX ORDER — QUETIAPINE FUMARATE 25 MG/1
25 TABLET, FILM COATED ORAL
Status: DISCONTINUED | OUTPATIENT
Start: 2024-01-03 | End: 2024-01-07 | Stop reason: HOSPADM

## 2024-01-03 RX ORDER — PANTOPRAZOLE SODIUM 40 MG/10ML
40 INJECTION, POWDER, LYOPHILIZED, FOR SOLUTION INTRAVENOUS EVERY 12 HOURS SCHEDULED
Status: DISCONTINUED | OUTPATIENT
Start: 2024-01-03 | End: 2024-01-04

## 2024-01-03 RX ORDER — OLANZAPINE 10 MG/2ML
INJECTION, POWDER, FOR SOLUTION INTRAMUSCULAR
Status: DISPENSED
Start: 2024-01-03 | End: 2024-01-04

## 2024-01-03 RX ORDER — METHYLPREDNISOLONE SODIUM SUCCINATE 40 MG/ML
40 INJECTION, POWDER, LYOPHILIZED, FOR SOLUTION INTRAMUSCULAR; INTRAVENOUS EVERY 12 HOURS SCHEDULED
Status: DISPENSED | OUTPATIENT
Start: 2024-01-03 | End: 2024-01-05

## 2024-01-03 RX ORDER — QUETIAPINE FUMARATE 25 MG/1
12.5 TABLET, FILM COATED ORAL ONCE
Status: COMPLETED | OUTPATIENT
Start: 2024-01-03 | End: 2024-01-03

## 2024-01-03 RX ORDER — POLYETHYLENE GLYCOL 3350 17 G/17G
17 POWDER, FOR SOLUTION ORAL DAILY
Status: DISCONTINUED | OUTPATIENT
Start: 2024-01-03 | End: 2024-01-07 | Stop reason: HOSPADM

## 2024-01-03 RX ORDER — AMOXICILLIN 250 MG
2 CAPSULE ORAL 2 TIMES DAILY
Status: DISCONTINUED | OUTPATIENT
Start: 2024-01-03 | End: 2024-01-07 | Stop reason: HOSPADM

## 2024-01-03 RX ORDER — METHYLPREDNISOLONE SODIUM SUCCINATE 40 MG/ML
40 INJECTION, POWDER, LYOPHILIZED, FOR SOLUTION INTRAMUSCULAR; INTRAVENOUS EVERY 8 HOURS SCHEDULED
Status: DISCONTINUED | OUTPATIENT
Start: 2024-01-03 | End: 2024-01-03

## 2024-01-03 RX ADMIN — IPRATROPIUM BROMIDE 0.5 MG: 0.5 SOLUTION RESPIRATORY (INHALATION) at 13:54

## 2024-01-03 RX ADMIN — LEVALBUTEROL HYDROCHLORIDE 1.25 MG: 1.25 SOLUTION RESPIRATORY (INHALATION) at 07:44

## 2024-01-03 RX ADMIN — IPRATROPIUM BROMIDE 0.5 MG: 0.5 SOLUTION RESPIRATORY (INHALATION) at 02:28

## 2024-01-03 RX ADMIN — PANTOPRAZOLE SODIUM 40 MG: 40 INJECTION, POWDER, FOR SOLUTION INTRAVENOUS at 22:07

## 2024-01-03 RX ADMIN — HYDRALAZINE HYDROCHLORIDE 10 MG: 20 INJECTION, SOLUTION INTRAMUSCULAR; INTRAVENOUS at 22:11

## 2024-01-03 RX ADMIN — LEVALBUTEROL HYDROCHLORIDE 1.25 MG: 1.25 SOLUTION RESPIRATORY (INHALATION) at 02:28

## 2024-01-03 RX ADMIN — CHLORHEXIDINE GLUCONATE 0.12% ORAL RINSE 15 ML: 1.2 LIQUID ORAL at 20:12

## 2024-01-03 RX ADMIN — METHYLPREDNISOLONE SODIUM SUCCINATE 60 MG: 125 INJECTION, POWDER, FOR SOLUTION INTRAMUSCULAR; INTRAVENOUS at 05:07

## 2024-01-03 RX ADMIN — LORAZEPAM 0.5 MG: 2 INJECTION INTRAMUSCULAR; INTRAVENOUS at 23:13

## 2024-01-03 RX ADMIN — LEVALBUTEROL HYDROCHLORIDE 1.25 MG: 1.25 SOLUTION RESPIRATORY (INHALATION) at 20:43

## 2024-01-03 RX ADMIN — SENNOSIDES AND DOCUSATE SODIUM 2 TABLET: 8.6; 5 TABLET ORAL at 15:37

## 2024-01-03 RX ADMIN — POLYETHYLENE GLYCOL 3350 17 G: 17 POWDER, FOR SOLUTION ORAL at 15:37

## 2024-01-03 RX ADMIN — ENOXAPARIN SODIUM 40 MG: 40 INJECTION SUBCUTANEOUS at 15:37

## 2024-01-03 RX ADMIN — LOSARTAN POTASSIUM 50 MG: 50 TABLET, FILM COATED ORAL at 08:24

## 2024-01-03 RX ADMIN — METHYLPREDNISOLONE SODIUM SUCCINATE 40 MG: 40 INJECTION, POWDER, FOR SOLUTION INTRAMUSCULAR; INTRAVENOUS at 20:12

## 2024-01-03 RX ADMIN — LORAZEPAM 0.5 MG: 2 INJECTION INTRAMUSCULAR; INTRAVENOUS at 22:05

## 2024-01-03 RX ADMIN — PANTOPRAZOLE SODIUM 40 MG: 40 TABLET, DELAYED RELEASE ORAL at 05:07

## 2024-01-03 RX ADMIN — IPRATROPIUM BROMIDE 0.5 MG: 0.5 SOLUTION RESPIRATORY (INHALATION) at 07:44

## 2024-01-03 RX ADMIN — LOSARTAN POTASSIUM 50 MG: 50 TABLET, FILM COATED ORAL at 20:12

## 2024-01-03 RX ADMIN — HEPARIN SODIUM 5000 UNITS: 5000 INJECTION INTRAVENOUS; SUBCUTANEOUS at 05:07

## 2024-01-03 RX ADMIN — AZITHROMYCIN 500 MG: 250 TABLET, FILM COATED ORAL at 05:07

## 2024-01-03 RX ADMIN — LEVALBUTEROL HYDROCHLORIDE 1.25 MG: 1.25 SOLUTION RESPIRATORY (INHALATION) at 13:54

## 2024-01-03 RX ADMIN — CHLORHEXIDINE GLUCONATE 0.12% ORAL RINSE 15 ML: 1.2 LIQUID ORAL at 08:24

## 2024-01-03 RX ADMIN — QUETIAPINE FUMARATE 25 MG: 25 TABLET ORAL at 20:12

## 2024-01-03 RX ADMIN — IPRATROPIUM BROMIDE 0.5 MG: 0.5 SOLUTION RESPIRATORY (INHALATION) at 20:43

## 2024-01-03 RX ADMIN — QUETIAPINE FUMARATE 12.5 MG: 25 TABLET ORAL at 23:14

## 2024-01-03 NOTE — PROGRESS NOTES
Pastoral Care Progress Note    1/3/2024  Patient: Severiano Feliciano : 1935  Admission Date & Time: 2024  MRN: [id number] [facility]: [id number]                     Chaplaincy Interventions Utilized:   Empowerment: Clarified, confirmed, or reviewed information from treatment team     Exploration: Explored spiritual needs & resources    Collaboration: Facilitated respect for spiritual/cultural practice during hospitalization    Relationship Building: Cultivated a relationship of care and support    Ritual:  provided sacrament of the sick    Chaplaincy Outcomes Achieved:  Expressed gratitude    Spiritual Coping Strategies Utilized:   Spiritual gratitude

## 2024-01-03 NOTE — PLAN OF CARE
Problem: OCCUPATIONAL THERAPY ADULT  Goal: Performs self-care activities at highest level of function for planned discharge setting.  See evaluation for individualized goals.  Description: Treatment Interventions: ADL retraining, Functional transfer training, UE strengthening/ROM, Endurance training, Cognitive reorientation, Patient/family training, Equipment evaluation/education, Compensatory technique education, Continued evaluation          See flowsheet documentation for full assessment, interventions and recommendations.   Note: Limitation: Decreased ADL status, Decreased UE strength, Decreased Safe judgement during ADL, Decreased cognition, Decreased endurance, Decreased high-level ADLs  Prognosis: Fair  Assessment: Pt is a 89y/o male admitted to the hospital 2* SOB. Pt noted with acute/chronic respiratory failure. Pt with PMH severe COPD, O2 dependent(2-3liters), BPH, DM, PVD, SBO, CHF, CAD, and recent(last month) hospitalization 2* respiratory failure. Per wife, PTA pt had ocassional assistance with his ADLs, transfers; ambulates with SPC; neg home alone, neg falls--pt noted as a poor historian. During initial eval, pt demonstrated deficits with his functional balance, functional mobility, ADL status, transfer safety, b/l UE strength, activity tolerance(currently fair=15-20mins), and cognition(i.e.problem-solving, judgement/safety). Pt would benefit from continued OT tx for the above deficits.2-3xwk/1-2wks. The patient's raw score on the AM-PAC Daily Activity Inpatient Short Form is 16. A raw score of less than 19 suggests the patient may benefit from discharge to post-acute rehabilitation services. Please refer to the recommendation of the Occupational Therapist for safe discharge planning.     Rehab Resource Intensity Level, OT: II (Moderate Resource Intensity)

## 2024-01-03 NOTE — UTILIZATION REVIEW
Notification of Unplanned, Urgent, or   Emergency Inpatient Admission   AUTHORIZATION REQUEST   [place] [address]  Tax ID: [id number]  NPI: [id number]  [facility] of Service: [facility]  Admission Level of Care: Inpatient  Place of Service Code: 21     Attending Physician Information  Attending Name and NPI#: Buddy Hayward Md [[id number]]  Phone: [phone]     Admission Information  Inpatient Admission Date/Time: 1/2/24  1:51 AM  Discharge Date/Time: No discharge date for patient encounter.  Admitting Diagnosis Code/Description:  Altered mental status [R41.82]  SOB (shortness of breath) [R06.02]  Acute on chronic respiratory failure with hypercapnia (HCC) [J96.22]     Utilization Review Contact  Tamika Dominguez Utilization   Phone: [phone]  Fax: [phone]  Email: [email or web address]  Contact for approvals/pending authorizations, clinical reviews, and discharge.     Physician Advisory Services Contact  Medical Necessity Denial & Dikr-ys-Qcno Discussion  Phone: [phone]  Fax: [phone]  Email: [email or web address]     DISCHARGE SUPPORT TEAM:  For Patients Discharge Needs & Updates  Phone: [phone] opt. 2 Fax: [phone]  Email: [email or web address]

## 2024-01-03 NOTE — ASSESSMENT & PLAN NOTE
Wt Readings from Last 3 Encounters:   01/02/24 61.8 kg (136 lb 3.9 oz)   12/27/23 64.4 kg (141 lb 14.4 oz)   12/14/23 62.1 kg (137 lb)     Appears euvolemic currently  Monitor I/Os, daily weights  Monitor fluid volume status closely

## 2024-01-03 NOTE — OCCUPATIONAL THERAPY NOTE
Occupational Therapy Evaluation     Patient Name: Severiano Feliciano  Today's Date: 1/3/2024  Problem List  Principal Problem:    Acute on chronic respiratory failure with hypoxia and hypercapnia (HCC)  Active Problems:    History of DVT (deep vein thrombosis)    Benign essential hypertension    Chronic diastolic CHF (congestive heart failure) (HCC)    Chronic kidney disease, stage 3a (HCC)    Past Medical History  Past Medical History:   Diagnosis Date    Acute metabolic encephalopathy 06/13/2020    Acute on chronic respiratory failure (HCC) 12/4/2022    Age-related cataract of right eye 4/4/2023    patient is medically cleared and presents with low risk of complication with this upcoming R cataract surgery.    Anemia     Aneurysm, aorta, thoracic (HCC)     Appendicolith     Ascending aortic aneurysm (HCC)     3.7    Asthma     BPH (benign prostatic hyperplasia)     CAD (coronary artery disease)     noted on CT scan    CHF (congestive heart failure) (HCC)     COPD (chronic obstructive pulmonary disease) (HCC)     Descending thoracic aortic aneurysm (HCC)     Diabetes mellitus (HCC)     Diverticulosis     Former tobacco use     GERD (gastroesophageal reflux disease)     History of DVT (deep vein thrombosis)     Left leg    History of transfusion     Hypertension     Inguinal hernia     right    Nephrolithiasis     Oxygen dependent     2LNC    Oxygen dependent     Pneumonia     Pre-diabetes     Prostate calculus     PVD (peripheral vascular disease) (HCC)     Recurrent right inguinal hernia w incarcertion 1/4/2023    Small bowel obstruction (HCC) 12/28/2022    Thoracic aortic aneurysm without rupture (HCC) 11/19/2018    Added automatically from request for surgery [id number]    Thrombocytopenia (HCC) 12/29/2018    Ulcer     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)     Varicose vein of leg     b/l     Past Surgical History  Past Surgical History:   Procedure Laterality Date    CARDIAC SURGERY       "ESOPHAGOGASTRODUODENOSCOPY      HERNIA REPAIR Right 1/21/2019    Procedure: REPAIR HERNIA INGUINAL WITH MESH;  Surgeon: David Lowry MD;  Location: [facility];  Service: General    HERNIA REPAIR Right 7/22/2023    Procedure: REPAIR RECURRENT INCARERATED HERNIA INGUINAL OPEN, RIGHT ORCHIECTOMY;  Surgeon: Mason Bertrand MD;  Location: [facility];  Service: General    INGUINAL HERNIA REPAIR Bilateral     IR TEVAR  12/27/2018    TX EVASC RPR DTA COVERAGE ART ORIGIN 1ST ENDOPROSTH N/A 12/27/2018    Procedure: TEVAR - endovascular thoracic aortic aneurysm repair;  Surgeon: Gladis Ortega MD;  Location: [facility];  Service: Vascular    THORACIC AORTIC ANEURYSM REPAIR  12/27/2018    VARICOSE VEIN SURGERY Bilateral     vein stripping           01/03/24 1110   Note Type   Note type Evaluation   Pain Assessment   Pain Assessment Tool FLACC   Pain Rating: FLACC (Rest) - Face 0   Pain Rating: FLACC (Rest) - Legs 0   Pain Rating: FLACC (Rest) - Activity 0   Pain Rating: FLACC (Rest) - Cry 1   Pain Rating: FLACC (Rest) - Consolability 0   Score: FLACC (Rest) 1   Restrictions/Precautions   Weight Bearing Precautions Per Order No   Other Precautions Telemetry;Multiple lines;O2;Fall Risk;Cognitive;Chair Alarm;Bed Alarm;Hard of hearing;Visual impairment   Home Living   Type of Home House   Home Layout Two level  (stays on 2nd flr--13 steps)   Bathroom Shower/Tub Tub/shower unit   Bathroom Toilet Standard   Bathroom Equipment Shower chair   Home Equipment Walker;Wheelchair-manual;Cane   Prior Function   Level of [place] Needs assistance with ADLs   Lives With Spouse   Falls in the last 6 months 0   Lifestyle   Autonomy Per wife, PTA pt had ocassional assistance with his ADLs, transfers; ambulates with SPC; neg home alone, neg falls--pt noted as a poor historian   Reciprocal Relationships supportive family   Service to Others unable to assess   Intrinsic Gratification watching TV   Subjective   Subjective \"I can stand.\"   ADL "   Where Assessed Edge of bed   Eating Assistance 5  Supervision/Setup   Grooming Assistance 5  Supervision/Setup   UB Bathing Assistance 4  Minimal Assistance   LB Bathing Assistance 3  Moderate Assistance   UB Dressing Assistance 4  Minimal Assistance   LB Dressing Assistance 3  Moderate Assistance   Toileting Assistance  3  Moderate Assistance   Bed Mobility   Rolling R 4  Minimal assistance   Additional items Assist x 1;Increased time required;Verbal cues;LE management   Rolling L 4  Minimal assistance   Additional items Assist x 1;Increased time required;Verbal cues;LE management   Supine to Sit 4  Minimal assistance   Additional items Assist x 1;Increased time required;Verbal cues;LE management   Transfers   Sit to Stand 4  Minimal assistance   Additional items Assist x 1;Increased time required;Verbal cues   Stand to Sit 4  Minimal assistance   Additional items Assist x 1;Increased time required;Verbal cues   Functional Mobility   Functional Mobility 4  Minimal assistance   Additional Comments x1   Additional items Rolling walker   Balance   Static Sitting Fair +   Dynamic Sitting Fair   Static Standing Fair -   Dynamic Standing Poor +   Activity Tolerance   Activity Tolerance Patient limited by fatigue   Medical Staff Made Aware nsg, CM   RUE Assessment   RUE Assessment WFL   RUE Strength   RUE Overall Strength Within Functional Limits - able to perform ADL tasks with strength  (3+/5 throughout)   LUE Assessment   LUE Assessment WFL   LUE Strength   LUE Overall Strength Within Functional Limits - able to perform ADL tasks with strength  (3+/5 throughout)   Hand Function   Gross Motor Coordination Functional   Fine Motor Coordination Functional   Sensation   Light Touch No apparent deficits   Proprioception   Proprioception No apparent deficits   Vision-Basic Assessment   Current Vision   (glasses)   Vision - Complex Assessment   Acuity   (impaired)   Psychosocial   Psychosocial (WDL) X   Patient  "Behaviors/Mood [place] affect   Cognition   Overall Cognitive Status Impaired   Arousal/Participation Alert   Attention Attends with cues to redirect   Orientation Level Oriented to person;Oriented to place   Memory Decreased recall of precautions   Following Commands Follows one step commands with increased time or repetition   Comments [employer] [id number]; hx cognitive decline?   Assessment   Limitation Decreased ADL status;Decreased UE strength;Decreased Safe judgement during ADL;Decreased cognition;Decreased endurance;Decreased high-level ADLs   Prognosis Fair   Assessment Pt is a 87y/o male admitted to the hospital 2* SOB. Pt noted with acute/chronic respiratory failure. Pt with PMH severe COPD, O2 dependent(2-3liters), BPH, DM, PVD, SBO, CHF, CAD, and recent(last month) hospitalization 2* respiratory failure. Per wife, PTA pt had ocassional assistance with his ADLs, transfers; ambulates with SPC; neg home alone, neg falls--pt noted as a poor historian. During initial eval, pt demonstrated deficits with his functional balance, functional mobility, ADL status, transfer safety, b/l UE strength, activity tolerance(currently fair=15-20mins), and cognition(i.e.problem-solving, judgement/safety). Pt would benefit from continued OT tx for the above deficits.2-3xwk/1-2wks. The patient's raw score on the AM-PAC Daily Activity Inpatient Short Form is 16. A raw score of less than 19 suggests the patient may benefit from discharge to post-acute rehabilitation services. Please refer to the recommendation of the Occupational Therapist for safe discharge planning.   Goals   Patient Goals \"to eat\"   STG Time Frame   (1-7 days)   Short Term Goal #1 Pt will demonstrate improved activity tolerance to good(20-30mins) and standing tolerance to 3-5mins to assist with ADLs.   Short Term Goal #2 Pt will demonstrate proper walker/transfer safety 100% of the time.   Short Term Goal  Pt will demonstrate mod I with their sit-stand " transfers to assist with completion of their LE dressing.   LTG Time Frame   (7-14 days)   Long Term Goal #1 Pt will demonstrate g/g- balance with all functional activities.   Long Term Goal #2 Pt will demonstrate mod I with their UE and LE bathing/dresssing.   Long Term Goal Pt will tolerate continued cognitive/home-safety assessment and appropriate d/c recommendations will be provided.   Plan   Treatment Interventions ADL retraining;Functional transfer training;UE strengthening/ROM;Endurance training;Cognitive reorientation;Patient/family training;Equipment evaluation/education;Compensatory technique education;Continued evaluation   Goal Expiration Date 01/17/24   OT Treatment Day 0   OT Frequency 2-3x/wk   Discharge Recommendation   Rehab Resource Intensity Level, OT II (Moderate Resource Intensity)   AM-PAC Daily Activity Inpatient   Lower Body Dressing 2   Bathing 2   Toileting 3   Upper Body Dressing 3   Grooming 3   Eating 3   Daily Activity Raw Score 16   Daily Activity Standardized Score (Calc for Raw Score >=11) 35.96   AM-PAC Applied Cognition Inpatient   Following a Speech/Presentation 3   Understanding Ordinary Conversation 3   Taking Medications 2   Remembering Where Things Are Placed or Put Away 2   Remembering List of 4-5 Errands 1   Taking Care of Complicated Tasks 1   Applied Cognition Raw Score 12   Applied Cognition Standardized Score 28.82   Corona Richardson

## 2024-01-03 NOTE — RESPIRATORY THERAPY NOTE
Tried multiple times to keep bipap on pt, pt not tolerating bipap. RN said pt kept taking the bipap mask off. Pt is on 5L nc, sating 98%. Provider is aware.

## 2024-01-03 NOTE — ASSESSMENT & PLAN NOTE
2/2 severe COPD with acute exacerbation  Pt chronically wears 2-3L oxygen at home  Appears to be chronically hypercapnic, but pH not compensated  Pt presented with similar symptoms in Dec 2023 and ultimately failed bipap and was intubated  D/W wife at bedside-she would want patient to be intubated again if needed  Initially wore bipap after admission and then was taken off during day  Became agitated necessitating precedex drip, would no longer wear bipap  Will attempt bipap again while sleeping given chronic hypercarbia  Continue steroids, nebs  Azithro for COPD exacerbation  Will check for Covid/flu/RSV

## 2024-01-03 NOTE — PROGRESS NOTES
[facility]  Progress Note  Name: Severiano Feliciano I  MRN: [id number]  Unit/Bed#: ICU 13 I Date of Admission: 1/1/2024   Date of Service: 1/3/2024  Hospital Day: 1    Assessment/Plan   * Acute on chronic respiratory failure with hypoxia and hypercapnia (HCC)  Assessment & Plan  2/2 severe COPD with acute exacerbation  Pt chronically wears 2-3L oxygen at home  Appears to be chronically hypercapnic, but pH not compensated  Pt presented with similar symptoms in Dec 2023 and ultimately failed bipap and was intubated  D/W wife at bedside-she would want patient to be intubated again if needed  Initially wore bipap after admission and then was taken off during day  Became agitated necessitating precedex drip, would no longer wear bipap  Will attempt bipap again while sleeping given chronic hypercarbia  Continue steroids, nebs  Azithro for COPD exacerbation  Will check for Covid/flu/RSV    Chronic kidney disease, stage 3a ([facility])  Assessment & Plan  Lab Results   Component Value Date    EGFR 82 01/02/2024    EGFR 78 01/01/2024    EGFR 78 12/27/2023    CREATININE 0.77 01/02/2024    CREATININE 0.85 01/01/2024    CREATININE 0.87 12/27/2023     Renal indices appear to be at baseline  Avoid nephrotoxic agents  Monitor renal indices    Benign essential hypertension  Assessment & Plan  Continue home losartan       History of DVT (deep vein thrombosis)  Assessment & Plan  Hx of DVT, not on anticoagulation  SQ DVT ppx heparin for now    Chronic diastolic CHF (congestive heart failure) ([facility])  Assessment & Plan  Wt Readings from Last 3 Encounters:   01/02/24 61.8 kg (136 lb 3.9 oz)   12/27/23 64.4 kg (141 lb 14.4 oz)   12/14/23 62.1 kg (137 lb)     Appears euvolemic currently  Monitor I/Os, daily weights  Monitor fluid volume status closely                   Disposition: Stepdown Level 1    ICU Core Measures     A: Assess, Prevent, and Manage Pain Has pain been assessed? Yes  Need for changes to pain  regimen? No   B: Both SAT/SAT  N/A   C: Choice of Sedation RASS Goal: 0 Alert and Calm  Need for changes to sedation or analgesia regimen? No   D: Delirium CAM-ICU: Positive   E: Early Mobility  Plan for early mobility? Yes   F: Family Engagement Plan for family engagement today? Yes       Antibiotic Review: Azithro x3 days for COPD exacerbation      Prophylaxis:  VTE VTE covered by:  heparin (porcine), Subcutaneous, 5,000 Units at 01/02/24 2134       Stress Ulcer  covered bypantoprazole (PROTONIX) 40 mg tablet [[id number]] (Long-Term Med), pantoprazole (PROTONIX) EC tablet 40 mg [[id number]]         Significant 24hr Events     24hr events: Became agitated during day requiring precedex drip. Unclear if he is fully oriented as patient is primarily Lao speaking. Will attempt bipap again while sleeping.     Subjective   Review of Systems   Unable to perform ROS: Other   Pt primarily Lao speaking, unclear if oriented x3   Objective                            Vitals I/O      Most Recent Min/Max in 24hrs   Temp 97.9 °F (36.6 °C) Temp  Min: 97.4 °F (36.3 °C)  Max: 99.8 °F (37.7 °C)   Pulse 70 Pulse  Min: 62  Max: 130   Resp (!) 24 Resp  Min: 13  Max: 27   /70 BP  Min: 116/62  Max: 223/109   O2 Sat 90 % SpO2  Min: 84 %  Max: 100 %      Intake/Output Summary (Last 24 hours) at 1/3/2024 0007  Last data filed at 1/2/2024 2000  Gross per 24 hour   Intake 7.83 ml   Output 400 ml   Net -392.17 ml       Diet Dysphagia/Modified Consistency; Dysphagia 3-Dental Soft; Thin Liquid    Invasive Monitoring           Physical Exam   Physical Exam  Eyes:      Extraocular Movements: Extraocular movements intact.      Pupils: Pupils are equal, round, and reactive to light.   Skin:     General: Skin is dry.   HENT:      Head: Normocephalic and atraumatic.      Mouth/Throat:      Mouth: Mucous membranes are dry.      Dentition: Abnormal dentition.   Neck:      Vascular: No JVD.   Cardiovascular:      Rate and Rhythm: Normal rate and  regular rhythm.      Heart sounds: Normal heart sounds.   Abdominal: General: Bowel sounds are normal.      Palpations: Abdomen is soft.      Tenderness: There is no abdominal tenderness.   Constitutional:       General: He is not in acute distress.     Appearance: He is ill-appearing.   Pulmonary:      Effort: No respiratory distress.      Breath sounds: Decreased breath sounds present.   Neurological:      Mental Status: He is alert.      Motor: No motor deficit.            Diagnostic Studies      EKG: NSR  Imaging:  I have personally reviewed pertinent reports.       Medications:  Scheduled PRN   azithromycin, 500 mg, Q24H  chlorhexidine, 15 mL, Q12H KIKE  heparin (porcine), 5,000 Units, Q8H IKKE  ipratropium, 0.5 mg, Q6H  levalbuterol, 1.25 mg, Q6H  losartan, 50 mg, BID  methylPREDNISolone sodium succinate, 60 mg, Q8H KIKE  pantoprazole, 40 mg, Early Morning      hydrALAZINE, 10 mg, Q6H PRN  hydrOXYzine HCL, 25 mg, Q6H PRN       Continuous    dexmedetomidine, 0.1-0.7 mcg/kg/hr, Last Rate: 0.2 mcg/kg/hr (01/02/24 2143)         Labs:    CBC    Recent Labs     01/01/24 2050 01/02/24  0635   WBC 4.23* 4.93   HGB 12.5 11.9*   HCT 43.4 39.9   * 118*     BMP    Recent Labs     01/01/24 2050 01/01/24  2131 01/02/24  0635   SODIUM 142  --  147   K 6.3* 4.5 4.5   CL 95*  --  98   CO2 >45*  --  >45*   BUN 27*  --  25   CREATININE 0.85  --  0.77   CALCIUM 9.9  --  10.0       Coags    No recent results     Additional Electrolytes  Recent Labs     01/02/24  0635   MG 2.1   PHOS 2.6   CAIONIZED 1.19          Blood Gas    Recent Labs     01/02/24  0110   PHART 7.303*   MRI1JWV 100.9*   PO2ART 88.0   TLC4WFP 48.9*   BEART 18.0   SOURCE Radial, Right     Recent Labs     01/01/24  2300 01/01/24  2343 01/02/24  0110   PHVEN 7.317  --   --    OSL3QVP 101.0*  --   --    PO2VEN 47.3*  --   --    ZLZ4ALW 50.5*  --   --    BEVEN 19.3  --   --    T8UWAMH 79.8  --   --    SOURCE  --    < > Radial, Right    < > = values in this  interval not displayed.    LFTs  Recent Labs     01/01/24 2050 01/02/24  0635   ALT 28 24   AST 26 14   ALKPHOS 80 66   ALB 3.7 3.5   TBILI 0.35 0.34       Infectious  Recent Labs     01/02/24  0635   PROCALCITONI 0.05     Glucose  Recent Labs     01/01/24 2050 01/02/24  0635   GLUC 300* 128               Non-Critical Care Time Statement: I have spent a total time of 20 minutes in caring for this patient including Documenting in the medical record and Reviewing / ordering tests, medicine, procedures  .     RAQUEL Salmeron

## 2024-01-03 NOTE — ASSESSMENT & PLAN NOTE
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) WRITTEN ON 1/3/2024 12:06 AM BY RAQUEL JOHNSON    2/2 severe COPD with acute exacerbation  Pt chronically wears 2-3L oxygen at home  Appears to be chronically hypercapnic, but pH not compensated  Pt presented with similar symptoms in Dec 2023 and ultimately failed bipap and was intubated  D/W wife at bedside-she would want patient to be intubated again if needed  Initially wore bipap after admission and then was taken off during day  Became agitated necessitating precedex drip, would no longer wear bipap  Will attempt bipap again while sleeping given chronic hypercarbia  Continue steroids, nebs  Azithro for COPD exacerbation  Will check for Covid/flu/RSV

## 2024-01-03 NOTE — PLAN OF CARE
Problem: Potential for Falls  Goal: Patient will remain free of falls  Description: INTERVENTIONS:  - Educate patient/family on patient safety including physical limitations  - Instruct patient to call for assistance with activity   - Consult OT/PT to assist with strengthening/mobility   - Keep Call bell within reach  - Keep bed low and locked with side rails adjusted as appropriate  - Keep care items and personal belongings within reach  - Initiate and maintain comfort rounds  - Make Fall Risk Sign visible to staff  - Apply yellow socks and bracelet for high fall risk patients  - Consider moving patient to room near nurses station  Outcome: Progressing     Problem: PAIN - ADULT  Goal: Verbalizes/displays adequate comfort level or baseline comfort level  Description: Interventions:  - Encourage patient to monitor pain and request assistance  - Assess pain using appropriate pain scale  - Administer analgesics based on type and severity of pain and evaluate response  - Implement non-pharmacological measures as appropriate and evaluate response  - Consider cultural and social influences on pain and pain management  - Notify physician/advanced practitioner if interventions unsuccessful or patient reports new pain  Outcome: Progressing     Problem: INFECTION - ADULT  Goal: Absence or prevention of progression during hospitalization  Description: INTERVENTIONS:  - Assess and monitor for signs and symptoms of infection  - Monitor lab/diagnostic results  - Monitor all insertion sites, i.e. indwelling lines, tubes, and drains  - Monitor endotracheal if appropriate and nasal secretions for changes in amount and color  - [place] appropriate cooling/warming therapies per order  - Administer medications as ordered  - Instruct and encourage patient and family to use good hand hygiene technique  - Identify and instruct in appropriate isolation precautions for identified infection/condition  Outcome: Progressing  Goal: Absence  of fever/infection during neutropenic period  Description: INTERVENTIONS:  - Monitor WBC    Outcome: Progressing     Problem: SAFETY ADULT  Goal: Patient will remain free of falls  Description: INTERVENTIONS:  - Educate patient/family on patient safety including physical limitations  - Instruct patient to call for assistance with activity   - Consult OT/PT to assist with strengthening/mobility   - Keep Call bell within reach  - Keep bed low and locked with side rails adjusted as appropriate  - Keep care items and personal belongings within reach  - Initiate and maintain comfort rounds  - Make Fall Risk Sign visible to staff  - Apply yellow socks and bracelet for high fall risk patients  - Consider moving patient to room near nurses station  Outcome: Progressing  Goal: Maintain or return to baseline ADL function  Description: INTERVENTIONS:  -  Assess patient's ability to carry out ADLs; assess patient's baseline for ADL function and identify physical deficits which impact ability to perform ADLs (bathing, care of mouth/teeth, toileting, grooming, dressing, etc.)  - Assess/evaluate cause of self-care deficits   - Assess range of motion  - Assess patient's mobility; develop plan if impaired  - Assess patient's need for assistive devices and provide as appropriate  - Encourage maximum independence but intervene and supervise when necessary  - Involve family in performance of ADLs  - Assess for home care needs following discharge   - Consider OT consult to assist with ADL evaluation and planning for discharge  - Provide patient education as appropriate  Outcome: Progressing  Goal: Maintains/Returns to pre admission functional level  Description: INTERVENTIONS:  - Perform AM-PAC 6 Click Basic Mobility/ Daily Activity assessment daily.  - Set and communicate daily mobility goal to care team and patient/family/caregiver.   - Collaborate with rehabilitation services on mobility goals if consulted  - Out of bed for  toileting  - Record patient progress and toleration of activity level   Outcome: Progressing     Problem: DISCHARGE PLANNING  Goal: Discharge to home or other facility with appropriate resources  Description: INTERVENTIONS:  - Identify barriers to discharge w/patient and caregiver  - Arrange for needed discharge resources and transportation as appropriate  - Identify discharge learning needs (meds, wound care, etc.)  - Arrange for interpretive services to assist at discharge as needed  - Refer to Case Management Department for coordinating discharge planning if the patient needs post-hospital services based on physician/advanced practitioner order or complex needs related to functional status, cognitive ability, or social support system  Outcome: Progressing     Problem: Knowledge Deficit  Goal: Patient/family/caregiver demonstrates understanding of disease process, treatment plan, medications, and discharge instructions  Description: Complete learning assessment and assess knowledge base.  Interventions:  - Provide teaching at level of understanding  - Provide teaching via preferred learning methods  Outcome: Progressing     Problem: RESPIRATORY - ADULT  Goal: Achieves optimal ventilation and oxygenation  Description: INTERVENTIONS:  - Assess for changes in respiratory status  - Assess for changes in mentation and behavior  - Position to facilitate oxygenation and minimize respiratory effort  - Oxygen administered by appropriate delivery if ordered  - Initiate smoking cessation education as indicated  - Encourage broncho-pulmonary hygiene including cough, deep breathe, Incentive Spirometry  - Assess the need for suctioning and aspirate as needed  - Assess and instruct to report SOB or any respiratory difficulty  - Respiratory Therapy support as indicated  Outcome: Progressing     Problem: Prexisting or High Potential for Compromised Skin Integrity  Goal: Skin integrity is maintained or improved  Description:  INTERVENTIONS:  - Identify patients at risk for skin breakdown  - Assess and monitor skin integrity  - Assess and monitor nutrition and hydration status  - Monitor labs   - Assess for incontinence   - Turn and reposition patient  - Assist with mobility/ambulation  - Relieve pressure over bony prominences  - Avoid friction and shearing  - Provide appropriate hygiene as needed including keeping skin clean and dry  - Evaluate need for skin moisturizer/barrier cream  - Collaborate with interdisciplinary team   - Patient/family teaching  - Consider wound care consult   Outcome: Progressing     Problem: METABOLIC, FLUID AND ELECTROLYTES - ADULT  Goal: Electrolytes maintained within normal limits  Description: INTERVENTIONS:  - Monitor labs and assess patient for signs and symptoms of electrolyte imbalances  - Administer electrolyte replacement as ordered  - Monitor response to electrolyte replacements, including repeat lab results as appropriate  - Instruct patient on fluid and nutrition as appropriate  Outcome: Progressing  Goal: Fluid balance maintained  Description: INTERVENTIONS:  - Monitor labs   - Monitor I/O and WT  - Instruct patient on fluid and nutrition as appropriate  - Assess for signs & symptoms of volume excess or deficit  Outcome: Progressing     Problem: HEMATOLOGIC - ADULT  Goal: Maintains hematologic stability  Description: INTERVENTIONS  - Assess for signs and symptoms of bleeding or hemorrhage  - Monitor labs  - Administer supportive blood products/factors as ordered and appropriate  Outcome: Progressing

## 2024-01-03 NOTE — UTILIZATION REVIEW
Initial Clinical Review    Admission: Date/Time/Statement:   Admission Orders (From admission, onward)       Ordered        01/02/24 0151  INPATIENT ADMISSION  Once                          Orders Placed This Encounter   Procedures    INPATIENT ADMISSION     Standing Status:   Standing     Number of Occurrences:   1     Order Specific Question:   Level of Care     Answer:   Level 1 Stepdown [13]     Order Specific Question:   Estimated length of stay     Answer:   More than 2 Midnights     Order Specific Question:   Certification     Answer:   I certify that inpatient services are medically necessary for this patient for a duration of greater than two midnights. See H&P and MD Progress Notes for additional information about the patient's course of treatment.     ED Arrival Information       Expected   -    Arrival   1/1/2024 19:30    Acuity   Emergent              Means of arrival   Walk-In    Escorted by   Family Member    Service   Critical Care/ICU    Admission type   Emergency              Arrival complaint   Known medical problems, COPD             Chief Complaint   Patient presents with    Shortness of Breath     Pt brought by wife for increasing sob. Pt seen here in early December for sob and was intubated due to high carbon dioxide.        Initial Presentation on 1/1: 88 y.o. male who presented self from home to [facility] ED. Inpatient admission for evaluation and treatment of respiratory failure s/t COPD. PMHx: anemia, BPH, CAD, CHF, CKD, COPD, T2DM, GERD, DVT, HTN, PVD. Presented w/ increased confusion and worsening SOB. Pt baseline 2-3 L O2 NC, on arrival requiring 6L O2. On exam, decreased breath sounds, tachypneic, pallor, disoriented. Plan: admit to critical care; BiPAP for increased work of breathing and hypoxia, check VBG, Trend labs, replete electrolytes as needed; anxiety meds while on BiPAP, IV solu-medrol continue PTA Meds, Trend labs, replete electrolytes as needed.       Date: 1/2    Day 2: Lungs decreased sounds. Plan: IV steroids, nebs, azithromycin, IV solu-medrol, BiPAP, continue current meds, NPO until respiratory status improves, prn anxiolytic, supportive care, Trend labs, replete electrolytes as needed.      Date: 01/03/24    Day 3: Required precedex gtt s/t agitation. Unclear if he is fully oriented. Trial BiPAP while sleeping. Has surpassed a 2nd midnight with active treatments and services, which include telemetry, continuous pulse ox, nebs, IV solu-medrol, continue current meds, DW, I&O, PT/OT evals. Trend labs, replete electrolytes as needed.      ED Triage Vitals   Temperature Pulse Respirations Blood Pressure SpO2   01/01/24 1936 01/01/24 1936 01/01/24 1936 01/01/24 1936 01/01/24 1930   98.3 °F (36.8 °C) 80 (!) 28 158/81 97 %      Temp Source Heart Rate Source Patient Position - Orthostatic VS BP Location FiO2 (%)   01/02/24 0405 01/01/24 1936 01/01/24 2054 01/01/24 2054 --   Axillary Monitor Sitting Right arm       Pain Score       01/02/24 0405       No Pain          Wt Readings from Last 1 Encounters:   01/02/24 61.8 kg (136 lb 3.9 oz)     Additional Vital Signs:   Date/Time Temp Pulse Resp BP MAP (mmHg) SpO2 Calculated FIO2 (%) - Nasal Cannula Nasal Cannula O2 Flow Rate (L/min) O2 Device   01/03/24 0747 98.3 °F (36.8 °C) -- -- -- -- -- 40 5 L/min Nasal cannula   01/03/24 0746 -- -- -- -- -- 100 % -- -- --   01/03/24 0600 -- 74 27 Abnormal  164/92 140 100 % -- -- --   01/03/24 0400 -- 72 24 Abnormal  167/81 119 100 % -- -- --   01/03/24 0309 97.4 °F (36.3 °C)   Abnormal  -- -- -- -- -- -- -- --   01/03/24 0300 -- 64 23 Abnormal  140/73 99 100 % -- -- --   01/03/24 0231 -- -- -- -- -- 96 % 40 5 L/min Nasal cannula   01/03/24 0200 -- 64 18 138/77 98 100 % -- -- --   01/03/24 0100 -- 64 19 144/78 105 100 % -- -- Nasal cannula    O2 Device: patient continues to refuse and rip off BIPAP at 01/03/24 0100   01/03/24 0000 -- 68 23 Abnormal  129/69 93 100 % 40 5 L/min Nasal cannula    01/02/24 2300 97.9 °F (36.6 °C) 70 24 Abnormal  140/70 97 90 % 40 5 L/min Nasal cannula   01/02/24 2200 -- 62 13 116/62 83 100 % -- -- BiPAP   01/02/24 2100 -- 72 23 Abnormal  148/74 110 100 % -- -- --   01/02/24 2000 -- 76 25 Abnormal  134/69 96 100 % -- -- --   01/02/24 1900 99.2 °F (37.3 °C) 76 23 Abnormal  148/69 101 99 % 36 4 L/min Nasal cannula   01/02/24 1800 -- 82 24 Abnormal  160/84 122 95 % 36 4 L/min Nasal cannula   01/02/24 1700 -- 82 27 Abnormal  151/79 104 98 % 36 4 L/min Nasal cannula   01/02/24 1600 -- 82 -- 123/60 79 96 % 36 4 L/min Nasal cannula   01/02/24 1500 -- 102 -- 157/72 102 88 % Abnormal  -- -- --   01/02/24 1443 99.8 °F (37.7 °C) -- -- -- -- -- -- -- --   01/02/24 1400 -- 130 Abnormal  27 Abnormal  223/109 Abnormal  173 84 % Abnormal  -- -- --   01/02/24 1300 -- -- -- 184/79 Abnormal  126 -- -- -- --   01/02/24 1210 99 °F (37.2 °C) 84 27 Abnormal  185/85 Abnormal  130 98 % -- -- --   01/02/24 1200 -- -- -- -- -- -- 40 5 L/min Nasal cannula   01/02/24 1100 -- 86 26 Abnormal  174/88 Abnormal  117 97 % -- -- --   01/02/24 1000 -- 80 14 177/139 Abnormal  148 98 % -- -- --   01/02/24 0900 -- 78 22 159/74 113 96 % -- -- --   01/02/24 0803 97.8 °F (36.6 °C) 74 15 147/80 114 97 % -- -- --   01/02/24 0800 -- 70 15 147/80 114 98 % -- -- --   01/02/24 0700 -- 78 19 159/86 112 97 % -- -- BiPAP   01/02/24 0405 97.4 °F (36.3 °C)   Abnormal  80 -- 178/81 Abnormal  -- 100 % -- -- BiPAP   01/02/24 0359 -- -- -- -- -- 97 % -- -- --   01/02/24 0300 -- 63 15 171/79 Abnormal  114 98 % -- -- None (Room air)   01/02/24 0257 -- -- -- -- -- 96 % -- -- --   01/02/24 0200 -- 62 15 146/66 95 96 % -- -- BiPAP   01/02/24 0100 -- 64 14 133/63 90 96 % -- -- None (Room air)   01/02/24 0000 -- 74 22 194/76 Abnormal  109 96 % -- -- BiPAP   01/01/24 2259 -- 82 22 172/85 Abnormal  -- 94 % -- -- BiPAP   01/01/24 2155 -- 87 20 175/79 Abnormal  -- 93 % -- -- BiPAP   01/01/24 2054 -- 81 24 Abnormal  185/92 Abnormal  -- 99 % --  -- Other (comment)    O2 Device: heart neb at 01/01/24 2054 01/01/24 2042 -- -- -- -- -- 97 % -- -- --   01/01/24 2001 -- -- -- -- -- 94 % 40 5 L/min Nasal cannula   01/01/24 2000 -- -- -- -- -- 88 % Abnormal  28 2 L/min Nasal cannula     Andrea Coma Scale  Date and Time Eye Opening Best Verbal Response Best Motor Response Andrea Coma Scale Score   01/03/24 0800 4 4 6 14   01/03/24 0600 4 4 6 14   01/03/24 0400 4 4 6 14   01/03/24 0200 4 4 6 14   01/03/24 0000 4 4 6 14   01/02/24 2200 4 4 6 14   01/02/24 2000 4 4 6 14   01/02/24 1200 4 4 6 14   01/02/24 0830 4 4 6 14   01/02/24 0405 4 4 6 14       Pertinent Labs/Diagnostic Test Results:   1/1 - EKG   Normal sinus rhythm with sinus arrhythmia     1/1 - EKG 2  Sinus rhythm with Premature atrial complexes     1/2 - EKG  Sinus rhythm  Premature atrial complexes    XR chest 1 view portable   Final Result by Memo Isaac MD (01/02 1131)      Bibasilar atelectasis with mild elevation of the left hemidiaphragm.                  Workstation performed: [id number]           Results from last 7 days   Lab Units 01/02/24 0434 12/27/23 2009   SARS-COV-2  Negative Negative     Results from last 7 days   Lab Units 01/03/24 0457 01/02/24 0635 01/01/24 2050 12/27/23 1955   WBC Thousand/uL 5.37 4.93 4.23* 6.86   HEMOGLOBIN g/dL 10.8* 11.9* 12.5 13.1   HEMATOCRIT % 35.8* 39.9 43.4 45.5   PLATELETS Thousands/uL 123* 118* 122* 95*   NEUTROS ABS Thousands/µL  --  3.38 3.64 4.44         Results from last 7 days   Lab Units 01/03/24 0457 01/02/24 0635 01/01/24 2131 01/01/24 2050 12/27/23 1955   SODIUM mmol/L 147 147  --  142 144   POTASSIUM mmol/L 4.8 4.5 4.5 6.3* 4.4   CHLORIDE mmol/L 99 98  --  95* 94*   CO2 mmol/L >45* >45*  --  >45* 45*   ANION GAP mmol/L  --   --   --   --  5   BUN mg/dL 27* 25  --  27* 27*   CREATININE mg/dL 0.72 0.77  --  0.85 0.87   EGFR ml/min/1.73sq m 84 82  --  78 78   CALCIUM mg/dL 9.4 10.0  --  9.9 10.0   CALCIUM, IONIZED mmol/L  --  1.19  --    --   --    MAGNESIUM mg/dL  --  2.1  --   --   --    PHOSPHORUS mg/dL  --  2.6  --   --   --      Results from last 7 days   Lab Units 01/02/24  0635 01/01/24 2050 12/27/23 1955   AST U/L 14 26 23   ALT U/L 24 28 21   ALK PHOS U/L 66 80 73   TOTAL PROTEIN g/dL 6.3* 7.0 7.0   ALBUMIN g/dL 3.5 3.7 4.1   TOTAL BILIRUBIN mg/dL 0.34 0.35 0.62     Results from last 7 days   Lab Units 12/27/23 2001   POC GLUCOSE mg/dl 123     Results from last 7 days   Lab Units 01/03/24  0457 01/02/24 0635 01/01/24 2050 12/27/23 1955   GLUCOSE RANDOM mg/dL 120 128 300* 125      Results from last 7 days   Lab Units 01/02/24  0110 01/01/24  2343   PH ART  7.303* 7.331*   PCO2 ART mm Hg 100.9* 89.5*   PO2 ART mm Hg 88.0 87.9   HCO3 ART mmol/L 48.9* 46.2*   BASE EXC ART mmol/L 18.0 16.3   O2 CONTENT ART mL/dL 16.3 16.5   O2 HGB, ARTERIAL % 95.1 95.5   ABG SOURCE  Radial, Right Radial, Right     Results from last 7 days   Lab Units 01/01/24 2300 01/01/24 2050 12/27/23 1955   PH DUSTIN  7.317 7.282* 7.303   PCO2 DUSTIN mm Hg 101.0* 97.0* 96.7*   PO2 DUSTIN mm Hg 47.3* 28.7* 31.2*   HCO3 DUSTIN mmol/L 50.5* 44.8* 46.8*   BASE EXC DUSTIN mmol/L 19.3 13.7 15.4   O2 CONTENT DUSTIN ml/dL 14.5 9.1 11.4   O2 HGB, VENOUS % 79.8 48.5* 56.6*      Results from last 7 days   Lab Units 01/02/24  0106 01/01/24 2300 01/01/24 2050 12/27/23 2210 12/27/23 1955   HS TNI 0HR ng/L  --   --  11  --  14   HS TNI 2HR ng/L  --  10  --  14  --    HSTNI D2 ng/L  --  -1  --  0  --    HS TNI 4HR ng/L 9  --   --   --   --    HSTNI D4 ng/L -2  --   --   --   --       Results from last 7 days   Lab Units 01/01/24 2050 12/27/23 1955   TSH 3RD GENERATON uIU/mL 0.091* 0.192*     Results from last 7 days   Lab Units 01/02/24  0635   PROCALCITONIN ng/ml 0.05     Results from last 7 days   Lab Units 01/01/24 2050 12/27/23 1955   LACTIC ACID mmol/L 1.2 1.0      Results from last 7 days   Lab Units 01/01/24 2050 12/27/23 1955   BNP pg/mL 48 37      Results from last 7 days   Lab  Units 01/01/24  2153   CLARITY UA  Turbid   COLOR UA  Yellow   SPEC GRAV UA  1.021   PH UA  7.0   GLUCOSE UA mg/dl 1000 (1%)*   KETONES UA mg/dl Negative   BLOOD UA  Negative   PROTEIN UA mg/dl Trace*   NITRITE UA  Negative   BILIRUBIN UA  Negative   UROBILINOGEN UA (BE) mg/dl <2.0   LEUKOCYTES UA  Negative   WBC UA /hpf 1-2   RBC UA /hpf 1-2   BACTERIA UA /hpf Occasional   EPITHELIAL CELLS WET PREP /hpf Occasional   MUCUS THREADS  Occasional*     Results from last 7 days   Lab Units 01/02/24  0434 12/27/23 2009   INFLUENZA A PCR  Negative Negative   INFLUENZA B PCR  Negative Negative   RSV PCR  Negative Negative      Results from last 7 days   Lab Units 01/01/24  2125 01/01/24 2050 12/27/23 1955 12/27/23  1900   BLOOD CULTURE  No Growth at 24 hrs. No Growth at 24 hrs. No Growth After 5 Days. No Growth After 5 Days.        ED Treatment:   Medication Administration from 01/01/2024 1930 to 01/02/2024 0410         Date/Time Order Dose Route Action     01/01/2024 2042 EST albuterol inhalation solution 10 mg 10 mg Nebulization Given     01/01/2024 2042 EST ipratropium (ATROVENT) 0.02 % inhalation solution 1 mg 1 mg Nebulization Given     01/01/2024 2042 EST sodium chloride 0.9 % inhalation solution 12 mL 12 mL Nebulization Given     01/01/2024 2139 EST LORazepam (ATIVAN) injection 0.5 mg 0.5 mg Intravenous Given     01/01/2024 2250 EST LORazepam (ATIVAN) injection 1 mg 1 mg Intravenous Given     01/02/2024 0359 EST levalbuterol (XOPENEX) inhalation solution 1.25 mg 1.25 mg Nebulization Given          Past Medical History:   Diagnosis Date    Acute metabolic encephalopathy 06/13/2020    Acute on chronic respiratory failure (HCC) 12/4/2022    Age-related cataract of right eye 4/4/2023    patient is medically cleared and presents with low risk of complication with this upcoming R cataract surgery.    Anemia     Aneurysm, aorta, thoracic (HCC)     Appendicolith     Ascending aortic aneurysm (HCC)     3.7    Asthma     BPH  (benign prostatic hyperplasia)     CAD (coronary artery disease)     noted on CT scan    CHF (congestive heart failure) (HCC)     COPD (chronic obstructive pulmonary disease) (HCC)     Descending thoracic aortic aneurysm (HCC)     Diabetes mellitus (HCC)     Diverticulosis     Former tobacco use     GERD (gastroesophageal reflux disease)     History of DVT (deep vein thrombosis)     Left leg    History of transfusion     Hypertension     Inguinal hernia     right    Nephrolithiasis     Oxygen dependent     2LNC    Oxygen dependent     Pneumonia     Pre-diabetes     Prostate calculus     PVD (peripheral vascular disease) (HCC)     Recurrent right inguinal hernia w incarcertion 1/4/2023    Small bowel obstruction (HCC) 12/28/2022    Thoracic aortic aneurysm without rupture (HCC) 11/19/2018    Added automatically from request for surgery [id number]    Thrombocytopenia (HCC) 12/29/2018    Ulcer     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 male  Admission Orders:  Dysphagia Dental Soft Diet.  Telemetry. Continuous Pulse Ox.  Fall precautions.  OOB to chair TID.  DW. I&O. SCDs.  Q2h neuro checks.  BiPAP qHS.     Scheduled Medications:  azithromycin, 500 mg, Oral, Q24H  chlorhexidine, 15 mL, Mouth/Throat, Q12H KIKE  heparin (porcine), 5,000 Units, Subcutaneous, Q8H KIKE  ipratropium, 0.5 mg, Nebulization, Q6H  levalbuterol, 1.25 mg, Nebulization, Q6H  losartan, 50 mg, Oral, BID  methylPREDNISolone sodium succinate, 40 mg, Intravenous, Q8H KIKE  pantoprazole, 40 mg, Oral, Early  Morning    Continuous IV Infusions:  dexmedetomidine, 0.1-0.7 mcg/kg/hr, Intravenous, Titrated    PRN Meds:  hydrALAZINE, 10 mg, Intravenous, Q6H PRN; 1/2 x1  hydrOXYzine HCL, 25 mg, Oral, Q6H PRN; 1/2 x1    Discontinued Meds:  ALPRAZolam (XANAX) tablet 0.25 mg  Dose: 0.25 mg  Freq: Once Route: PO  Start: 01/02/24 1415 End: 01/02/24 1409   azithromycin (ZITHROMAX) 500 mg in sodium chloride 0.9 % 250 mL IVPB  Dose: 500 mg  Freq: Every 24 hours Route: IV  Last Dose: Stopped (01/02/24 0830)  Start: 01/02/24 0330 End: 01/02/24 1056   HYDROmorphone (DILAUDID) injection 0.5 mg  Dose: 0.5 mg  Freq: Once Route: IV  Start: 01/02/24 1445 End: 01/02/24 1434   methylPREDNISolone sodium succinate (Solu-MEDROL) injection 60 mg  Dose: 60 mg  Freq: Every 8 hours scheduled Route: IV  Start: 01/02/24 0600 End: 01/03/24 0810       IP CONSULT TO CASE MANAGEMENT    Network Utilization Review Department  ATTENTION: Please call with any questions or concerns to [phone] and carefully listen to the prompts so that you are directed to the right person. All voicemails are confidential.   For Discharge needs, contact Care Management DC Support Team at [phone] opt. [phone]  Send all requests for admission clinical reviews, approved or denied determinations and any other requests to dedicated fax number below belonging to the campus where the patient is receiving treatment. List of dedicated fax numbers for the Facilities:  FACILITY NAME UR FAX NUMBER   ADMISSION DENIALS (Administrative/Medical Necessity) [phone]   DISCHARGE SUPPORT TEAM (NETWORK) [phone]   PARENT CHILD HEALTH (Maternity/NICU/Pediatrics) [phone]   [facility] [phone]   [facility] [phone]   [facility] [phone]   [facility] [phone]   [facility] [phone]   [facility] [phone]   [facility]  [facility] [phone]   [facility] [phone]   [facility] [phone]   [facility] [phone]   [facility] [phone]     ,m

## 2024-01-03 NOTE — ASSESSMENT & PLAN NOTE
Lab Results   Component Value Date    EGFR 82 01/02/2024    EGFR 78 01/01/2024    EGFR 78 12/27/2023    CREATININE 0.77 01/02/2024    CREATININE 0.85 01/01/2024    CREATININE 0.87 12/27/2023     Renal indices appear to be at baseline  Avoid nephrotoxic agents  Monitor renal indices

## 2024-01-04 LAB
ABO GROUP BLD: NORMAL
BASOPHILS # BLD AUTO: 0.02 THOUSANDS/ÂΜL (ref 0–0.1)
BASOPHILS NFR BLD AUTO: 0 % (ref 0–1)
BLD GP AB SCN SERPL QL: NEGATIVE
BUN SERPL-MCNC: 25 MG/DL (ref 5–25)
CALCIUM SERPL-MCNC: 9.5 MG/DL (ref 8.4–10.2)
CHLORIDE SERPL-SCNC: 100 MMOL/L (ref 96–108)
CO2 SERPL-SCNC: >45 MMOL/L (ref 21–32)
CREAT SERPL-MCNC: 0.72 MG/DL (ref 0.6–1.3)
EOSINOPHIL # BLD AUTO: 0.07 THOUSAND/ÂΜL (ref 0–0.61)
EOSINOPHIL NFR BLD AUTO: 1 % (ref 0–6)
ERYTHROCYTE [DISTWIDTH] IN BLOOD BY AUTOMATED COUNT: 13 % (ref 11.6–15.1)
GFR SERPL CREATININE-BSD FRML MDRD: 84 ML/MIN/1.73SQ M
GLUCOSE SERPL-MCNC: 116 MG/DL (ref 65–140)
HCT VFR BLD AUTO: 38.9 % (ref 36.5–49.3)
HCT VFR BLD AUTO: 44.8 % (ref 36.5–49.3)
HGB BLD-MCNC: 11.7 G/DL (ref 12–17)
HGB BLD-MCNC: 13.7 G/DL (ref 12–17)
IMM GRANULOCYTES # BLD AUTO: 0.02 THOUSAND/UL (ref 0–0.2)
IMM GRANULOCYTES NFR BLD AUTO: 0 % (ref 0–2)
LYMPHOCYTES # BLD AUTO: 1.69 THOUSANDS/ÂΜL (ref 0.6–4.47)
LYMPHOCYTES NFR BLD AUTO: 26 % (ref 14–44)
MCH RBC QN AUTO: 31.4 PG (ref 26.8–34.3)
MCHC RBC AUTO-ENTMCNC: 30.1 G/DL (ref 31.4–37.4)
MCV RBC AUTO: 104 FL (ref 82–98)
MONOCYTES # BLD AUTO: 0.7 THOUSAND/ÂΜL (ref 0.17–1.22)
MONOCYTES NFR BLD AUTO: 11 % (ref 4–12)
NEUTROPHILS # BLD AUTO: 4.1 THOUSANDS/ÂΜL (ref 1.85–7.62)
NEUTS SEG NFR BLD AUTO: 62 % (ref 43–75)
NRBC BLD AUTO-RTO: 0 /100 WBCS
PLATELET # BLD AUTO: 121 THOUSANDS/UL (ref 149–390)
PMV BLD AUTO: 9.2 FL (ref 8.9–12.7)
POTASSIUM SERPL-SCNC: 3.6 MMOL/L (ref 3.5–5.3)
RBC # BLD AUTO: 3.73 MILLION/UL (ref 3.88–5.62)
RH BLD: POSITIVE
SODIUM SERPL-SCNC: 147 MMOL/L (ref 135–147)
SPECIMEN EXPIRATION DATE: NORMAL
WBC # BLD AUTO: 6.6 THOUSAND/UL (ref 4.31–10.16)

## 2024-01-04 PROCEDURE — 94660 CPAP INITIATION&MGMT: CPT

## 2024-01-04 PROCEDURE — 97163 PT EVAL HIGH COMPLEX 45 MIN: CPT | Performed by: PHYSICAL THERAPIST

## 2024-01-04 PROCEDURE — 99222 1ST HOSP IP/OBS MODERATE 55: CPT | Performed by: INTERNAL MEDICINE

## 2024-01-04 PROCEDURE — 85014 HEMATOCRIT: CPT

## 2024-01-04 PROCEDURE — 94640 AIRWAY INHALATION TREATMENT: CPT

## 2024-01-04 PROCEDURE — 85018 HEMOGLOBIN: CPT

## 2024-01-04 PROCEDURE — 86900 BLOOD TYPING SEROLOGIC ABO: CPT

## 2024-01-04 PROCEDURE — 99232 SBSQ HOSP IP/OBS MODERATE 35: CPT | Performed by: STUDENT IN AN ORGANIZED HEALTH CARE EDUCATION/TRAINING PROGRAM

## 2024-01-04 PROCEDURE — 94760 N-INVAS EAR/PLS OXIMETRY 1: CPT

## 2024-01-04 PROCEDURE — 86901 BLOOD TYPING SEROLOGIC RH(D): CPT

## 2024-01-04 PROCEDURE — C9113 INJ PANTOPRAZOLE SODIUM, VIA: HCPCS

## 2024-01-04 PROCEDURE — 85025 COMPLETE CBC W/AUTO DIFF WBC: CPT

## 2024-01-04 PROCEDURE — 80048 BASIC METABOLIC PNL TOTAL CA: CPT

## 2024-01-04 PROCEDURE — 86850 RBC ANTIBODY SCREEN: CPT

## 2024-01-04 RX ORDER — PANTOPRAZOLE SODIUM 40 MG/1
40 TABLET, DELAYED RELEASE ORAL
Status: DISCONTINUED | OUTPATIENT
Start: 2024-01-05 | End: 2024-01-07 | Stop reason: HOSPADM

## 2024-01-04 RX ORDER — PREDNISONE 20 MG/1
40 TABLET ORAL DAILY
Status: DISCONTINUED | OUTPATIENT
Start: 2024-01-05 | End: 2024-01-04

## 2024-01-04 RX ORDER — METHYLPREDNISOLONE SODIUM SUCCINATE 40 MG/ML
40 INJECTION, POWDER, LYOPHILIZED, FOR SOLUTION INTRAMUSCULAR; INTRAVENOUS EVERY 12 HOURS SCHEDULED
Status: DISCONTINUED | OUTPATIENT
Start: 2024-01-04 | End: 2024-01-05

## 2024-01-04 RX ADMIN — LOSARTAN POTASSIUM 50 MG: 50 TABLET, FILM COATED ORAL at 22:42

## 2024-01-04 RX ADMIN — CHLORHEXIDINE GLUCONATE 0.12% ORAL RINSE 15 ML: 1.2 LIQUID ORAL at 09:38

## 2024-01-04 RX ADMIN — SENNOSIDES AND DOCUSATE SODIUM 2 TABLET: 8.6; 5 TABLET ORAL at 09:39

## 2024-01-04 RX ADMIN — QUETIAPINE FUMARATE 25 MG: 25 TABLET ORAL at 22:42

## 2024-01-04 RX ADMIN — LEVALBUTEROL HYDROCHLORIDE 1.25 MG: 1.25 SOLUTION RESPIRATORY (INHALATION) at 08:26

## 2024-01-04 RX ADMIN — METHYLPREDNISOLONE SODIUM SUCCINATE 40 MG: 40 INJECTION, POWDER, FOR SOLUTION INTRAMUSCULAR; INTRAVENOUS at 09:38

## 2024-01-04 RX ADMIN — POLYETHYLENE GLYCOL 3350 17 G: 17 POWDER, FOR SOLUTION ORAL at 09:38

## 2024-01-04 RX ADMIN — LOSARTAN POTASSIUM 50 MG: 50 TABLET, FILM COATED ORAL at 09:39

## 2024-01-04 RX ADMIN — PANTOPRAZOLE SODIUM 40 MG: 40 INJECTION, POWDER, FOR SOLUTION INTRAVENOUS at 09:39

## 2024-01-04 RX ADMIN — IPRATROPIUM BROMIDE 0.5 MG: 0.5 SOLUTION RESPIRATORY (INHALATION) at 14:02

## 2024-01-04 RX ADMIN — IPRATROPIUM BROMIDE 0.5 MG: 0.5 SOLUTION RESPIRATORY (INHALATION) at 02:35

## 2024-01-04 RX ADMIN — LEVALBUTEROL HYDROCHLORIDE 1.25 MG: 1.25 SOLUTION RESPIRATORY (INHALATION) at 19:41

## 2024-01-04 RX ADMIN — IPRATROPIUM BROMIDE 0.5 MG: 0.5 SOLUTION RESPIRATORY (INHALATION) at 19:41

## 2024-01-04 RX ADMIN — LEVALBUTEROL HYDROCHLORIDE 1.25 MG: 1.25 SOLUTION RESPIRATORY (INHALATION) at 02:35

## 2024-01-04 RX ADMIN — METHYLPREDNISOLONE SODIUM SUCCINATE 40 MG: 40 INJECTION, POWDER, FOR SOLUTION INTRAMUSCULAR; INTRAVENOUS at 22:42

## 2024-01-04 RX ADMIN — HYDRALAZINE HYDROCHLORIDE 10 MG: 20 INJECTION, SOLUTION INTRAMUSCULAR; INTRAVENOUS at 06:12

## 2024-01-04 RX ADMIN — LEVALBUTEROL HYDROCHLORIDE 1.25 MG: 1.25 SOLUTION RESPIRATORY (INHALATION) at 14:02

## 2024-01-04 RX ADMIN — SENNOSIDES AND DOCUSATE SODIUM 2 TABLET: 8.6; 5 TABLET ORAL at 17:49

## 2024-01-04 RX ADMIN — IPRATROPIUM BROMIDE 0.5 MG: 0.5 SOLUTION RESPIRATORY (INHALATION) at 08:26

## 2024-01-04 NOTE — CONSULTS
Consultation - Palliative and Supportive Care   Severiano Feliciano 88 y.o. male [id number]    Assessment:  Acute on chronic hypoxic and hypercapnic respiratory failure   Severe COPD with acute exacerbation  CKD stage III  HTN  CHF pEF  Agitation  Goals of care counseling    Plan:  Symptom management    2.   Goals:  Level {1234:79250} code status  Disease focused care ***.   Concerns introduced today include:  Will continue discussions regarding GOC as patient's clinical presentation evolves.  Encouraged follow up with Palliative Medicine on an outpatient basis after discharge for continued symptom management.  Our office will contact patient to schedule a hospital follow up.     3.  Social support:  Patient is finding comfort ***  Supportive listening provided  Normalized experience of patient/family  Provided anxiety containment  Provided anticipatory guidance  Encouraged self care  Home structure/ living situation  Investigated spiritual needs      4.  Follow up  ***  Palliative Care will continue to follow for symptom management and goals of care discussions will be ongoing.  Please reach out via [employer] [facility] if questions or concerns arise.  Palliative Medicine will sign off today as goals of care are clear and symptoms are well managed at this time.    5. Care Coordination  Reviewed case with SLIM, ***  Reviewed case with CM, ***  Reviewed case with Hospice Liaison, ***              I have reviewed the patient's controlled substance dispensing history in the Prescription Drug Monitoring Program in compliance with the [place] regulations before prescribing any controlled substances.  Last refills  No opioid or benzo refills in [place] over past year.    Decisional apparatus:  Patient is *** competent on exam today.  If competency is lost, patient's substitute decision maker would default to *** by [place] Act 169.  ER contacts:  Name Relation Home Work Mobile   Viridiana Zambrano Spouse [phone]  [phone]    Jim Patino Grandparent [phone]     Renny Fernandez Son [phone]   Rocio Fernandez Daughter   [phone]   Severiano,Julio Son   [phone]     Advance Directive/Living Will: none  POLST: none  POA Forms: none    We appreciate the invitation to be involved in this patient's care.  We will continue to follow throughout this hospitalization.  Please do not hesitate to reach our on call provider through our clinic answering service at [phone] should you have acute symptom control concerns.    Jennifer P. Bloch, MSN, RAQUEL, ACHPN  Palliative and Supportive Care  Clinic/Answering Service: [phone]  You can find me on [employer]!     IDENTIFICATION:  Inpatient consult to Palliative Care  Consult performed by: Jennifer Paige Bloch, CRNP  Consult ordered by: RAQUEL [place]      Inpatient consult to Palliative Care  Consult performed by: Jennifer Paige Bloch, CRNP  Consult ordered by: Yris Espitia MD        Physician Requesting Consult: Ze Lemons MD  Date of admission: 1/2/24  LOS: 2 days  Reason for Consult / Principal Problem: GOC counseling and SM secondary to acute respiratory failure, CHF    History of Present Illness:  Severiano Feliciano is a 88 y.o. male who presents with a palliative diagnosis of ***  *** was brought into the ED at [facility] on ///23 secondary to ***          Review of Systems  ***    Past Medical History:   Diagnosis Date    Acute metabolic encephalopathy 06/13/2020    Acute on chronic respiratory failure (HCC) 12/4/2022    Age-related cataract of right eye 4/4/2023    patient is medically cleared and presents with low risk of complication with this upcoming R cataract surgery.    Anemia     Aneurysm, aorta, thoracic (HCC)     Appendicolith     Ascending aortic aneurysm (HCC)     3.7    Asthma     BPH (benign prostatic hyperplasia)     CAD (coronary artery disease)     noted on CT scan    CHF (congestive heart failure) (HCC)     COPD (chronic  obstructive pulmonary disease) (HCC)     Descending thoracic aortic aneurysm (HCC)     Diabetes mellitus (HCC)     Diverticulosis     Former tobacco use     GERD (gastroesophageal reflux disease)     History of DVT (deep vein thrombosis)     Left leg    History of transfusion     Hypertension     Inguinal hernia     right    Nephrolithiasis     Oxygen dependent     2LNC    Oxygen dependent     Pneumonia     Pre-diabetes     Prostate calculus     PVD (peripheral vascular disease) (HCC)     Recurrent right inguinal hernia w incarcertion 1/4/2023    Small bowel obstruction (HCC) 12/28/2022    Thoracic aortic aneurysm without rupture ([facility]) 11/19/2018    Added automatically from request for surgery [id number]    Thrombocytopenia (HCC) 12/29/2018    Ulcer     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)     Varicose vein of leg     b/l     Past Surgical History:   Procedure Laterality Date    CARDIAC SURGERY      ESOPHAGOGASTRODUODENOSCOPY      HERNIA REPAIR Right 1/21/2019    Procedure: REPAIR HERNIA INGUINAL WITH MESH;  Surgeon: David Lowry MD;  Location:  MAIN OR;  Service: General    HERNIA REPAIR Right 7/22/2023    Procedure: REPAIR RECURRENT INCARERATED HERNIA INGUINAL OPEN, RIGHT ORCHIECTOMY;  Surgeon: Mason Bertrand MD;  Location: [facility] Main OR;  Service: General    INGUINAL HERNIA REPAIR Bilateral     IR TEVAR  12/27/2018    MO EVASC RPR DTA COVERAGE ART ORIGIN 1ST ENDOPROSTH N/A 12/27/2018    Procedure: TEVAR - endovascular thoracic aortic aneurysm repair;  Surgeon: Gladis Ortega MD;  Location:  MAIN OR;  Service: Vascular    THORACIC AORTIC ANEURYSM REPAIR  12/27/2018    VARICOSE VEIN SURGERY Bilateral     vein stripping     Social History     Socioeconomic History    Marital status: /Civil Union     Spouse name: Not on file    Number of children: Not on file    Years of education: Not on file    Highest education level: Not on file   Occupational History    Not on file   Tobacco Use     Smoking status: Former     Current packs/day: 0.00     Average packs/day: 1 pack/day for 35.0 years (35.0 ttl pk-yrs)     Types: Cigarettes     Start date:      Quit date:      Years since quittin.0    Smokeless tobacco: Never   Vaping Use    Vaping status: Never Used   Substance and Sexual Activity    Alcohol use: Never    Drug use: No    Sexual activity: Never   Other Topics Concern    Not on file   Social History Narrative    ** Merged History Encounter **          Social Determinants of Health     Financial Resource Strain: High Risk (10/25/2023)    Overall Financial Resource Strain (CARDIA)     Difficulty of Paying Living Expenses: Very hard   Food Insecurity: No Food Insecurity (2023)    Hunger Vital Sign     Worried About Running Out of Food in the Last Year: Never true     Ran Out of Food in the Last Year: Never true   Transportation Needs: No Transportation Needs (2023)    PRAPARE - Transportation     Lack of Transportation (Medical): No     Lack of Transportation (Non-Medical): No   Recent Concern: Transportation Needs - Unmet Transportation Needs (10/25/2023)    PRAPARE - Transportation     Lack of Transportation (Medical): Yes     Lack of Transportation (Non-Medical): Yes   Physical Activity: Inactive (2020)    Received from [facility]    Exercise Vital Sign     Days of Exercise per Week: 0 days     Minutes of Exercise per Session: 0 min   Stress: No Stress Concern Present (2020)    Received from [facility]    [place] [employer] - Occupational Stress Questionnaire     Feeling of Stress : Only a little   Social Connections: Moderately Integrated (2020)    Received from [facility]    Social Connection and Isolation Panel [NHANES]     Frequency of Communication with Friends and Family: More than three times a week     Frequency of Social Gatherings with Friends and Family: More than three times  "a week     Attends Anabaptism Services: 1 to 4 times per year     Active Member of Clubs or Organizations: No     Attends Club or Organization Meetings: Never     Marital Status:    Intimate Partner Violence: Not At Risk (5/19/2020)    Received from [facility]    Humiliation, Afraid, Rape, and Kick questionnaire     Fear of Current or Ex-Partner: No     Emotionally Abused: No     Physically Abused: No     Sexually Abused: No   Housing Stability: Low Risk  (12/8/2023)    Housing Stability Vital Sign     Unable to Pay for Housing in the Last Year: No     Number of Places Lived in the Last Year: 1     Unstable Housing in the Last Year: No     Family History   Problem Relation Age of Onset    Tuberculosis Mother     No Known Problems Father     Cancer Sister     Diabetes Family     Hypertension Family        Medications:  all current active meds have been reviewed    Allergies   Allergen Reactions    Penicillins Hives, Itching and Rash    Lisinopril Rash     Side pains and rash     Zyprexa [Olanzapine] Tongue Swelling       Objective:  /68   Pulse 86   Temp 98.1 °F (36.7 °C) (Axillary)   Resp (!) 27   Ht 5' 3\" (1.6 m)   Wt 60.9 kg (134 lb 4.2 oz)   SpO2 96%   BMI 23.78 kg/m²     Physical Exam  ***    Lab Results: I have personally reviewed pertinent labs., CBC:   Lab Results   Component Value Date    WBC 6.60 01/04/2024    HGB 11.7 (L) 01/04/2024    HCT 38.9 01/04/2024     (H) 01/04/2024     (L) 01/04/2024    RBC 3.73 (L) 01/04/2024    MCH 31.4 01/04/2024    MCHC 30.1 (L) 01/04/2024    RDW 13.0 01/04/2024    MPV 9.2 01/04/2024    NRBC 0 01/04/2024   , CMP:   Lab Results   Component Value Date    SODIUM 147 01/04/2024    K 3.6 01/04/2024     01/04/2024    CO2 >45 (HH) 01/04/2024    BUN 25 01/04/2024    CREATININE 0.72 01/04/2024    CALCIUM 9.5 01/04/2024    EGFR 84 01/04/2024     Imaging Studies: I have personally reviewed pertinent reports.  XR chest 1 view " portable  Result Date: 1/2/2024  Impression: Bibasilar atelectasis with mild elevation of the left hemidiaphragm.     CTA ED chest PE study  Result Date: 12/27/2023  Impression: No pulmonary embolus or other acute abnormality. Unchanged aneurysmal dilation of the proximal aortic arch and grafting of the descending aorta     Bronchoscopy  Result Date: 12/6/2023  Impression: No significant secretions noted; normal appearing airways     XR chest 1 view portable  Result Date: 12/6/2023  Impression: Mild congestion with bibasilar atelectasis.     XR chest portable ICU  Result Date: 12/6/2023  Impression: Endotracheal tube above the nano. Possible right lung base infiltrate     CT chest abdomen pelvis w contrast  Result Date: 12/5/2023  Impression: 1. Secretions with near occlusion of the bronchus intermedius, peribronchial infiltrate in the right middle lobe and right lower lobe. Findings could reflect aspiration. 2. Aneurysmal dilatation of the thoracoabdominal aorta with stent placement. The size of the aneurysm is minimally increased at the level of the proximal aortic arch and the aortic hiatus measuring 4.3 cm. 3. Distended stomach containing ingested material. Extensive colonic diverticulosis. 4. Multiple incidental findings described in the body of the report.     CT head wo contrast  Result Date: 12/5/2023  Impression: No acute intracranial abnormality.  Stable chronic microangiopathic changes within the brain. 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 Workstation performed: [id number]      EKG, Pathology, and Other Studies: I have personally reviewed pertinent reports.    Counseling / Coordination of Care  Total floor / unit time spent today *** minutes. Greater than 50% of total time was spent with the patient and / or family counseling and / or coordination of care. A description of the counseling / coordination of care: Reviewed chart, ***  "provided medical updates, determined goals of care, discussed palliative care and symptom management, discussed comfort care and hospice care, discussed code status, provided anticipatory guidance, determined competency and POA/HCA, determined social/family support, provided psychosocial support. Reviewed with MONICO, ICU team, Hospice Liaison, RN and CM.    This note was not shared with the patient due to {Reason why note was not shared:80121}    Portions of this document may have been created using dictation software and as such some \"sound alike\" terms may have been generated by the system. Do not hesitate to contact me with any questions or clarifications.    "

## 2024-01-04 NOTE — ASSESSMENT & PLAN NOTE
Rectal bleeding with BM yesterday, H&H stable  Appreciate GI input, colonoscopy inpatient versus outpatient pending stability of respiratory status

## 2024-01-04 NOTE — CASE MANAGEMENT
Case Management Assessment & Discharge Planning Note    Patient name Severiano Feliciano  Location ICU 13/ICU 13 MRN [id number]  : 1935 Date 2024       Current Admission Date: 2024  Current Admission Diagnosis:Acute on chronic respiratory failure with hypoxia and hypercapnia (HCC)   Patient Active Problem List    Diagnosis Date Noted    Macrocytosis 2023    Aspiration into airway 2023    Constipation 2023    Prediabetes 10/25/2023    Bilateral hearing loss 10/25/2023    PAC (premature atrial contraction) 2023    Hypoxemia 2023    Vitamin B12 deficiency 2023    Vitamin D deficiency 2023    Chronic kidney disease, stage 3a (HCC) 2023    Weight loss 2022    Acute on chronic respiratory failure with hypoxia and hypercapnia (HCC) 2022    Supplemental oxygen dependent 2022    Rectal bleeding 2021    Hyperlipidemia 2021    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    Dysphagia 2020    TIA (transient ischemic attack) 2020    S/P hernia repair 2019    Acquired renal cyst of left kidney 2019    Chronic diastolic CHF (congestive heart failure) (HCC) 2019    Thrombocytopenia (HCC) 2018    Varicose veins of both lower extremities with pain 2018    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HCC) 2018    Chronic respiratory failure with hypoxia (HCC) 2018    Descending aortic aneurysm (HCC) 2018    History of DVT (deep vein thrombosis) 2018    Chronic obstructive pulmonary disease with acute exacerbation (HCC) 2018    Benign essential hypertension 2017      LOS (days): 2  Geometric Mean LOS (GMLOS) (days):   Days to GMLOS:     OBJECTIVE:  PATIENT READMITTED TO HOSPITAL  Risk of Unplanned Readmission Score: 28.77         Current admission status: Inpatient       Preferred Pharmacy:   [facility] - [place] Aguilar  [address]  [address]  [address]  [place] [address]  Phone: [phone] Fax: [phone]    [facility] - [place] - [address]  [address]  [place] [address]  Phone: [phone] Fax: [phone]    Primary Care Provider: Kirby Casanova MD    Primary Insurance: [employer] MEDICARE [facility]  Secondary Insurance: [employer] [facility]    ASSESSMENT:  Active Health Care Proxies       Viridiana Zambrano Health Care Representative - Spouse   Primary Phone: [phone] (Mobile)  Home Phone: [phone]                 Advance Directives  Primary Contact: Viridiana (spouse) [phone]         Readmission Root Cause  30 Day Readmission: Yes  Did you understand whom to contact if you had questions or problems?: Yes  Were you able to get your prescriptions filled when you left the hospital?: Yes  Did you take your medications as prescribed?: Yes  Were you able to get to your follow-up appointments?: No, Yes  During previous admission, was a post-acute recommendation made?: No  Patient was readmitted due to: Acute respiratory failure    Patient Information  Admitted from:: Home  Mental Status: Confused  During Assessment patient was accompanied by: Spouse  Assessment information provided by:: Son (with spouse at bedside)  Primary Caregiver: Family  Caregiver's Name:: Son  Caregiver's Relationship to Patient:: Family Member  Support Systems: Spouse/significant other, Family members  County of Residence: [place]  What [facility] do you live in?: [place]  Home entry access options. Select all that apply.: Stairs  Number of steps to enter home.: 5  Type of Current Residence: 2 story home  Living Arrangements: Lives w/ Spouse/significant other, Lives w/ Son    Activities of Daily Living Prior to Admission  Functional Status: Independent  Completes ADLs independently?: No  Level of ADL dependence: Assistance (son is cg 40hrs a week thru [employer])  Ambulates  independently?: Yes  Does patient use assisted devices?: Yes  Assisted Devices (DME) used: Straight Cane, Home Oxygen concentrator, Portable Oxygen tanks (cane in community)  DME Company Name (respiratory supplies): [employer]  O2 Rate(s): 2-3L  Does patient currently own DME?: Yes  What DME does the patient currently own?: Walker, Shower Chair, Home Oxygen concentrator, Portable Oxygen tanks, Straight Cane  Does patient have a history of Outpatient Therapy (PT/OT)?: No  Does the patient have a history of Short-Term Rehab?: No  Does patient have a history of HHC?: No ([facility]admit from [facility] in July)  Does patient currently have HHC?: No         Patient Information Continued  Income Source: SSI/SSD  Does patient receive dialysis treatments?: No  Does patient have a history of substance abuse?: No  Does patient have a history of Mental Health Diagnosis?: No         Means of Transportation  Means of Transport to [facility]:: Family transport      Housing Stability: Low Risk  (1/4/2024)    Housing Stability Vital Sign     Unable to Pay for Housing in the Last Year: No     Number of Places Lived in the Last Year: 1     Unstable Housing in the Last Year: No   Food Insecurity: No Food Insecurity (1/4/2024)    Hunger Vital Sign     Worried About Running Out of Food in the Last Year: Never true     Ran Out of Food in the Last Year: Never true   Transportation Needs: No Transportation Needs (1/4/2024)    PRAPARE - Transportation     Lack of Transportation (Medical): No     Lack of Transportation (Non-Medical): No   Recent Concern: Transportation Needs - Unmet Transportation Needs (10/25/2023)    PRAPARE - Transportation     Lack of Transportation (Medical): Yes     Lack of Transportation (Non-Medical): Yes   Utilities: Not At Risk (1/4/2024)    [facility] Utilities     Threatened with loss of utilities: No       DISCHARGE DETAILS:    Discharge planning discussed with:: Son and spouse        CM contacted family/caregiver?: Yes              Contacts  Relationship to Patient:: Family  Reason/Outcome: Emergency Contact, Discharge Planning              Other Referral/Resources/Interventions Provided:  Interventions: None Indicated

## 2024-01-04 NOTE — PROGRESS NOTES
[facility]  Progress Note  Name: Severiano Feliciano I  MRN: [id number]  Unit/Bed#: ICU 13 I Date of Admission: 1/1/2024   Date of Service: 1/4/2024 I Hospital Day: 2    Assessment/Plan   Chronic obstructive pulmonary disease with acute exacerbation (HCC)  Assessment & Plan  Past medical history of severe COPD  Continue IV Solu-Medrol 40 mg every 12, Xopenex and Atrovent    Chronic kidney disease, stage 3a (HCC)  Assessment & Plan  Lab Results   Component Value Date    EGFR 84 01/04/2024    EGFR 84 01/03/2024    EGFR 82 01/02/2024    CREATININE 0.72 01/04/2024    CREATININE 0.72 01/03/2024    CREATININE 0.77 01/02/2024     Renal function stable at baseline    Rectal bleeding  Assessment & Plan  Rectal bleeding with BM yesterday, H&H stable  Appreciate GI input, colonoscopy inpatient versus outpatient pending stability of respiratory status    Chronic diastolic CHF (congestive heart failure) (HCC)  Assessment & Plan  Wt Readings from Last 3 Encounters:   01/04/24 60.9 kg (134 lb 4.2 oz)   12/27/23 64.4 kg (141 lb 14.4 oz)   12/14/23 62.1 kg (137 lb)     Patient appears euvolemic, continue to monitor.  Not on diuretics prior to admission          Chronic respiratory failure with hypoxia (HCC)  Assessment & Plan  Chronically on 2 to 3 L nasal cannula    Benign essential hypertension  Assessment & Plan  Currently controlled, continue losartan 50 mg twice daily    History of DVT (deep vein thrombosis)  Assessment & Plan  DVT prophylaxis, not on anticoagulation PTA    * Acute on chronic respiratory failure with hypoxia and hypercapnia (HCC)  Assessment & Plan  In the setting of severe COPD exacerbation  Has been noncompliant with BiPAP  Initially monitored in ICU, for transfer out on 01/03  Continue IV steroids per pulmonology, nebs  Encourage utilizing BiPAP at bedtime  Currently on 5 L nasal cannula, wean down to 4 L today will plan to transition back to baseline of 2 to 3 L NC  Palliative  care consulted, for further goals of care discussion         Metabolic encephalopathy in the setting of acute hypercapnic respiratory failure, as evidenced by agitation, requiring Precedex drip, Ativan and bipap.     VTE Pharmacologic Prophylaxis:   Pharmacologic:  none, due to BRBPR  Mechanical VTE Prophylaxis in Place: Yes    Discussions with Specialists or Other Care Team Provider: none    Education and Discussions with Family / Patient: wife bedside    Current Length of Stay: 2 day(s)    Current Patient Status: Inpatient   Certification Statement: The patient will continue to require additional inpatient hospital stay due to monitor H/H, palliative care eval    Discharge Plan: 24-48 hours    Code Status: Level 1 - Full Code      Subjective:   Overnight noted to be agitated, required seroquel. Reports breathing well today.  utilized with wife also present bedside. Eager for discharge home. Discussed plan and agreeable.    Objective:     Vitals:   Temp (24hrs), Av.3 °F (36.8 °C), Min:97.6 °F (36.4 °C), Max:98.8 °F (37.1 °C)    Temp:  [97.6 °F (36.4 °C)-98.8 °F (37.1 °C)] 98.8 °F (37.1 °C)  HR:  [] 92  Resp:  [13-39] 24  BP: ()/() 166/84  SpO2:  [92 %-100 %] 96 %  Body mass index is 23.78 kg/m².     Input and Output Summary (last 24 hours):       Intake/Output Summary (Last 24 hours) at 2024 1328  Last data filed at 2024 0851  Gross per 24 hour   Intake 7.75 ml   Output 450 ml   Net -442.25 ml       Physical Exam:     Physical Exam  Vitals and nursing note reviewed.   Constitutional:       Appearance: He is ill-appearing.   HENT:      Head: Normocephalic.   Eyes:      Conjunctiva/sclera: Conjunctivae normal.   Cardiovascular:      Rate and Rhythm: Normal rate.   Pulmonary:      Effort: Pulmonary effort is normal.      Breath sounds: No wheezing.   Abdominal:      General: Bowel sounds are normal. There is no distension.      Palpations: Abdomen is soft.    Musculoskeletal:         General: No swelling.      Right lower leg: No edema.      Left lower leg: No edema.   Skin:     General: Skin is warm and dry.   Neurological:      General: No focal deficit present.      Mental Status: He is alert. Mental status is at baseline.         Additional Data:     Labs:    Results from last 7 days   Lab Units 01/04/24  0906 01/04/24  0046   WBC Thousand/uL  --  6.60   HEMOGLOBIN g/dL 13.7 11.7*   HEMATOCRIT % 44.8 38.9   PLATELETS Thousands/uL  --  121*   NEUTROS PCT %  --  62   LYMPHS PCT %  --  26   MONOS PCT %  --  11   EOS PCT %  --  1     Results from last 7 days   Lab Units 01/04/24  0034 01/03/24  0457 01/02/24  0635   SODIUM mmol/L 147   < > 147   POTASSIUM mmol/L 3.6   < > 4.5   CHLORIDE mmol/L 100   < > 98   CO2 mmol/L >45*   < > >45*   BUN mg/dL 25   < > 25   CREATININE mg/dL 0.72   < > 0.77   CALCIUM mg/dL 9.5   < > 10.0   ALBUMIN g/dL  --   --  3.5   TOTAL BILIRUBIN mg/dL  --   --  0.34   ALK PHOS U/L  --   --  66   ALT U/L  --   --  24   AST U/L  --   --  14   GLUCOSE RANDOM mg/dL 116   < > 128    < > = values in this interval not displayed.                 Results from last 7 days   Lab Units 01/02/24  0635 01/01/24  2050   LACTIC ACID mmol/L  --  1.2   PROCALCITONIN ng/ml 0.05  --            * I Have Reviewed All Lab Data Listed Above.  * Additional Pertinent Lab Tests Reviewed: All Labs For Current Hospital Admission Reviewed    Mobility:  Basic Mobility Inpatient Raw Score: 10  -[facility] Goal: 4: Move to chair/commode  -[facility] Achieved: 2: Bed activities/Dependent transfer    Lines:     Invasive Devices       Peripheral Intravenous Line  Duration             Peripheral IV 01/01/24 Right Antecubital 2 days                       Imaging:    Imaging Reports Reviewed Today Include:     XR chest 1 view portable    Result Date: 1/2/2024  Impression: Bibasilar atelectasis with mild elevation of the left hemidiaphragm. Workstation performed: [id number]        Recent  Cultures (last 7 days):     Results from last 7 days   Lab Units 01/01/24 2125 01/01/24 2050   BLOOD CULTURE  No Growth at 48 hrs. No Growth at 48 hrs.       Last 24 Hours Medication List:   Current Facility-Administered Medications   Medication Dose Route Frequency Provider Last Rate    chlorhexidine  15 mL Mouth/Throat Q12H KIKE RAQUEL Salmeron      hydrALAZINE  10 mg Intravenous Q6H PRN Yris Espitia MD      hydrOXYzine HCL  25 mg Oral Q6H PRN Yris Espitia MD      ipratropium  0.5 mg Nebulization Q6H Buddy Hayward MD      levalbuterol  1.25 mg Nebulization Q6H Buddy Hayward MD      losartan  50 mg Oral BID RAQUEL Salmeron      methylPREDNISolone sodium succinate  40 mg Intravenous Q12H KIKE Lemons MD      methylPREDNISolone sodium succinate  40 mg Intravenous Q12H [facility] Ze Lemons MD      [START ON 1/5/2024] pantoprazole  40 mg Oral Early Morning Justino Sol MD      polyethylene glycol  17 g Oral Daily Yris Espitia MD      QUEtiapine  25 mg Oral HS Yris Espitia MD      senna-docusate sodium  2 tablet Oral BID Yris Espitia MD          Today, Patient Was Seen By: Ze Lemons MD    ** Please Note: Dictation voice to text software may have been used in the creation of this document. **

## 2024-01-04 NOTE — ASSESSMENT & PLAN NOTE
Lab Results   Component Value Date    EGFR 84 01/04/2024    EGFR 84 01/03/2024    EGFR 82 01/02/2024    CREATININE 0.72 01/04/2024    CREATININE 0.72 01/03/2024    CREATININE 0.77 01/02/2024     Renal function stable at baseline

## 2024-01-04 NOTE — CONSULTS
Consultation - Palliative & Supportive Care   Severiano Feliciano  88 y.o.  male  ICU 13/ICU 13   MRN: [id number]  Encounter: [id number]    ASSESSMENT:    Patient Active Problem List   Diagnosis    Descending aortic aneurysm (HCC)    History of DVT (deep vein thrombosis)    Benign essential hypertension    Chronic respiratory failure with hypoxia (HCC)    Varicose veins of both lower extremities with pain    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HCC)    Thrombocytopenia (HCC)    Chronic diastolic CHF (congestive heart failure) (HCC)    Acquired renal cyst of left kidney    TIA (transient ischemic attack)    Dysphagia    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)    Rectal bleeding    Hyperlipidemia    Acute on chronic respiratory failure with hypoxia and hypercapnia (HCC)    Weight loss    S/P hernia repair    Supplemental oxygen dependent    Chronic kidney disease, stage 3a (HCC)    Vitamin B12 deficiency    Vitamin D deficiency    Hypoxemia    Chronic obstructive pulmonary disease with acute exacerbation (HCC)    PAC (premature atrial contraction)    Prediabetes    Bilateral hearing loss    Constipation    Aspiration into airway    Macrocytosis       Active problems addressed:  Acute on chronic hypoxic and hypercapnic respiratory failure   Severe COPD with acute exacerbation  CKD stage III  HTN  CHF pEF  Agitation  Goals of care counseling    Consult is for GOC    PLAN:    1. Goals:   Patient appears to lack capacity to make any complex medical choice    Tried to call son but no , unable to leave VM    Code status: Level 1 - Full Code   Decisional apparatus:  Patient does not have capacity to make medical decisions on my exam today. If such capacity is lost, patient's substitute decision maker would default to wife by [place] 169.   Advance Directive / Living Will / POLST:  none on file    We appreciate the opportunity to participate in this patient's care. We will continue to follow.  Please do not hesitate to contact our on-call provider through our clinic answering service at [phone] should you have acute symptom control concerns.    IDENTIFICATION:  Inpatient consult to Palliative Care  Consult performed by: Jessie Lara MD  Consult ordered by: RAQUEL [place]      Inpatient consult to Palliative Care  Consult performed by: Jessie Lara MD  Consult ordered by: Yris Espitia MD        Reason for Consult / Principal Problem: GOC    HISTORY OF PRESENT ILLNESS:    Severiano Feliciano is a 88 y.o. male with dementia, COPD, CKD, HTN, hx of DVT, CHFpEF who was admitted from home on 1/2/2024 due to shortness of breath 2/2 AECOPD requiring BiPAP, admitted to the ICU for close monitoring. Overnight, he had 2 episodes of bloody BM. GI consulted, recommending colonoscopy when stable. Palliative for GOC.     Saw patient in the ICU, asleep. 1:1 in the room for safety as he can get up from bed and try to walk, non-redirectable. About to go to room 440, awaiting report. Tried to call son but phone is busy    Interview and exam limited by: dementia    Review of Systems   Unable to perform ROS: Dementia       Past Medical History:   Diagnosis Date    Acute metabolic encephalopathy 06/13/2020    Acute on chronic respiratory failure (HCC) 12/4/2022    Age-related cataract of right eye 4/4/2023    patient is medically cleared and presents with low risk of complication with this upcoming R cataract surgery.    Anemia     Aneurysm, aorta, thoracic (HCC)     Appendicolith     Ascending aortic aneurysm (HCC)     3.7    Asthma     BPH (benign prostatic hyperplasia)     CAD (coronary artery disease)     noted on CT scan    CHF (congestive heart failure) (HCC)     COPD (chronic obstructive pulmonary disease) (HCC)     Descending thoracic aortic aneurysm (HCC)     Diabetes mellitus (HCC)     Diverticulosis     Former tobacco use     GERD (gastroesophageal reflux disease)     History of DVT (deep vein  thrombosis)     Left leg    History of transfusion     Hypertension     Inguinal hernia     right    Nephrolithiasis     Oxygen dependent     2LNC    Oxygen dependent     Pneumonia     Pre-diabetes     Prostate calculus     PVD (peripheral vascular disease) (HCC)     Recurrent right inguinal hernia w incarcertion 2023    Small bowel obstruction (HCC) 2022    Thoracic aortic aneurysm without rupture (HCC) 2018    Added automatically from request for surgery [id number]    Thrombocytopenia (HCC) 2018    Ulcer     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)     Varicose vein of leg     b/l     Past Surgical History:   Procedure Laterality Date    CARDIAC SURGERY      ESOPHAGOGASTRODUODENOSCOPY      HERNIA REPAIR Right 2019    Procedure: REPAIR HERNIA INGUINAL WITH MESH;  Surgeon: David Lowry MD;  Location:  MAIN OR;  Service: General    HERNIA REPAIR Right 2023    Procedure: REPAIR RECURRENT INCARERATED HERNIA INGUINAL OPEN, RIGHT ORCHIECTOMY;  Surgeon: Mason Bertrand MD;  Location: [facility];  Service: General    INGUINAL HERNIA REPAIR Bilateral     IR TEVAR  2018    [place] EVASC RPR DTA COVERAGE ART ORIGIN 1ST ENDOPROSTH N/A 2018    Procedure: TEVAR - endovascular thoracic aortic aneurysm repair;  Surgeon: Gladis Ortega MD;  Location: [facility];  Service: Vascular    THORACIC AORTIC ANEURYSM REPAIR  2018    VARICOSE VEIN SURGERY Bilateral     vein stripping     Social History     Socioeconomic History    Marital status: /Civil Union     Spouse name: Not on file    Number of children: Not on file    Years of education: Not on file    Highest education level: Not on file   Occupational History    Not on file   Tobacco Use    Smoking status: Former     Current packs/day: 0.00     Average packs/day: 1 pack/day for 35.0 years (35.0 ttl pk-yrs)     Types: Cigarettes     Start date:      Quit date:      Years since quittin.0    Smokeless  tobacco: Never   Vaping Use    Vaping status: Never Used   Substance and Sexual Activity    Alcohol use: Never    Drug use: No    Sexual activity: Never   Other Topics Concern    Not on file   Social History Narrative    ** Merged History Encounter **          Social Determinants of Health     Financial Resource Strain: High Risk (10/25/2023)    Overall Financial Resource Strain (CARDIA)     Difficulty of Paying Living Expenses: Very hard   Food Insecurity: No Food Insecurity (1/4/2024)    Hunger Vital Sign     Worried About Running Out of Food in the Last Year: Never true     Ran Out of Food in the Last Year: Never true   Transportation Needs: No Transportation Needs (1/4/2024)    PRAPARE - Transportation     Lack of Transportation (Medical): No     Lack of Transportation (Non-Medical): No   Recent Concern: Transportation Needs - Unmet Transportation Needs (10/25/2023)    PRAPARE - Transportation     Lack of Transportation (Medical): Yes     Lack of Transportation (Non-Medical): Yes   Physical Activity: Inactive (5/19/2020)    Received from [facility]    Exercise Vital Sign     Days of Exercise per Week: 0 days     Minutes of Exercise per Session: 0 min   Stress: No Stress Concern Present (5/19/2020)    Received from [facility]    [place] [place] [employer] - Occupational Stress Questionnaire     Feeling of Stress : Only a little   Social Connections: Moderately Integrated (5/19/2020)    Received from [facility]    Social Connection and Isolation Panel [NHANES]     Frequency of Communication with Friends and Family: More than three times a week     Frequency of Social Gatherings with Friends and Family: More than three times a week     Attends [employer] Services: 1 to 4 times per year     Active Member of Clubs or Organizations: No     Attends Club or Organization Meetings: Never     Marital Status:    Intimate Partner Violence: Not At Risk  "(5/19/2020)    Received from [facility]    Humiliation, Afraid, Rape, and Kick questionnaire     Fear of Current or Ex-Partner: No     Emotionally Abused: No     Physically Abused: No     Sexually Abused: No   Housing Stability: Low Risk  (1/4/2024)    Housing Stability Vital Sign     Unable to Pay for Housing in the Last Year: No     Number of Places Lived in the Last Year: 1     Unstable Housing in the Last Year: No     Family History   Problem Relation Age of Onset    Tuberculosis Mother     No Known Problems Father     Cancer Sister     Diabetes Family     Hypertension Family        MEDICATIONS / ALLERGIES:  all current active meds have been reviewed    Allergies   Allergen Reactions    Penicillins Hives, Itching and Rash    Lisinopril Rash     Side pains and rash     Zyprexa [Olanzapine] Tongue Swelling       OBJECTIVE:  /84   Pulse 92   Temp 98.8 °F (37.1 °C) (Oral)   Resp (!) 24   Ht 5' 3\" (1.6 m)   Wt 60.9 kg (134 lb 4.2 oz)   SpO2 96%   BMI 23.78 kg/m²   Physical Exam:  Constitutional: Appears frail, acutely and chronically ill looking. Comfortable. In no acute physical or emotional distress.   Head: Normocephalic and atraumatic.   Eyes: Eyes closed. Lids normal  Neck: No visible adenopathy or masses.  Respiratory: Effort normal. No stridor. No respiratory distress. On NC  Gastrointestinal: No abdominal distension.   Musculoskeletal: No edema.   Neurological: Asleep    Skin: Dry, no diaphoresis. Ashen   Psychiatric: Calm    Lab Results: I have personally reviewed pertinent labs.  Imaging Studies: I have personally reviewed pertinent reports.    EKG, Pathology, and Other Studies: I have personally reviewed pertinent reports.      Counseling / Coordination of Care:  Counseling / Coordination of Care  Total floor / unit time spent today 15 minutes. Greater than 50% of total time was spent with the patient and / or family counseling and / or coordination of care. A description of " the counseling / coordination of care: cahrt check, discussed with RN condition, tried to contact family     Jessie Lara MD  [facility] Palliative and Supportive Care  [phone]

## 2024-01-04 NOTE — ASSESSMENT & PLAN NOTE
Wt Readings from Last 3 Encounters:   01/04/24 60.9 kg (134 lb 4.2 oz)   12/27/23 64.4 kg (141 lb 14.4 oz)   12/14/23 62.1 kg (137 lb)     Patient appears euvolemic, continue to monitor.  Not on diuretics prior to admission

## 2024-01-04 NOTE — PLAN OF CARE
Problem: PHYSICAL THERAPY ADULT  Goal: Performs mobility at highest level of function for planned discharge setting.  See evaluation for individualized goals.  Description: Treatment/Interventions: Functional transfer training, LE strengthening/ROM, Elevations, Therapeutic exercise, Endurance training, Patient/family training, Bed mobility, Gait training, Spoke to nursing, OT  Equipment Recommended: Walker (need to clarify if pt owns walker)       See flowsheet documentation for full assessment, interventions and recommendations.  Note: Prognosis: Good  Problem List: Decreased strength, Decreased endurance, Impaired balance, Decreased mobility, Decreased safety awareness, Impaired hearing  Assessment: Pt. 88 y.o.male presents with SOB. Past medical hx includes COPD, stage III CKD, chronic respiratory failure on oxygen, hypertension, history of DVT. Pt admitted for Acute on chronic respiratory failure with hypoxia and hypercapnia (HCC) w/ Altered mental status, SOB, Acute on chronic respiratory failure with hypercapnia. Pt referred to PT for functional mobility evaluation & D/C planning w/ orders of  OOB to chair . Pt greeted in bedside chair. PTA, pt reports needing A with IADLs, (A) w/ ADL's, (A) w/ amb, and use of SPC for functional mobility . Pt currently on 5L O2 NC; use of 2-3 L O2 at home. No pain reported. Information gathered from pts daughter via cell phone 2* pt [place] speaking only. Required minAx1 for sit<>stand, and ambulation. Pt able to ambulate 10' with RW and minAx1 in room. Cues for RW management and hand placement. Assistance with O2 tubing. No gross LOB noted however forward flexed and dec foot clearance. Pt demonstrated dec endurance and tolerance to activity. Denies reports of dizziness or SOB t/o session. Pt was educated on fall precautions and reinforced w/ good understanding. Pt would benefit from continued PT to address deficits as defined above and maximize level of independence with  functional mobility and safety. Based on pt presentation and impaired function, pt would benefit from level II, (moderate resource intensity) at D/C. The patient's AM-PAC Basic Mobility Inpatient Short Form Raw Score is 16. A Raw score of less than or equal to 16 suggests the patient may benefit from discharge to post-acute rehabilitation services. Please also refer to the recommendation of the Physical Therapist for safe discharge planning. Pt would benefit from RW at D/C however daughter unsure if pt already owns RW. CM to follow. Nsg staff to continue to mobilized pt (OOB in chair for all meals & ambulate in room/unit) as tolerated to prevent further decline in function. Nsg notified.        Rehab Resource Intensity Level, PT: II (Moderate Resource Intensity)    See flowsheet documentation for full assessment.

## 2024-01-04 NOTE — CONSULTS
Consultation -  [facility]  Severiano Feliciano 88 y.o. male MRN: [id number]  Unit/Bed#: ICU 13 Encounter: [id number]        Inpatient consult to gastroenterology  Consult performed by: Justino Sol MD  Consult ordered by: Oren Leyva PA-C          Reason for Consult / Principal Problem:     Hematochezia      ASSESSMENT AND PLAN:      Hematochezia  Chronic constipation  Hemorrhoids    Two bloody BM last night but none this morning. Hgb remains stable. Suspect bleeding is most likely 2/2 hemorrhoids, chronic constipation. Patient will need colonoscopy. This was recommended on previous admission but not yet done. We will consider colonoscopy while inpatient but after patient recovers from respiratory standpoint vs pursuing this outpatient as long as bleeding/Hgb remains stable.  Continue to trend Hgb and transfuse for Hgb <7  Continue to monitor BM  Resume previous diet  Resume home dose PTX  Continue bowel regimen as ordered by primary team  Plan for colonoscopy outpatient vs inpatient when more stable from respiratory standpoint  Please reach out when patient has been optimized from respiratory standpoint    ______________________________________________________________________    HPI:  Severiano Feliciano is an 89 yo M with PMH ofdementia, COPD chronically on 2-4L, TIA, dCHF, DVT, CKD3 who presented for SOB 2/2 COPD exacerbation requiring ICU stay for continuous BiPAP use. GI consulted as patient noted to have 2 bloody BM's overnight documented as mix of bright red and dark red. Due to patient's underlying dementia, rest of history obtained from wife at bedside using . She noted first episode of bright red blood per rectum 6 months ago. Patient has history of chronic constipation with straining to defecate. Patient was evaluated at that time and bleeding was felt to be due to hemorrhoids. In November 2023, patient was admitted for rectal bleeding. CT high volume  bleeding study showed hyperdense stool material. Bleeding was suspected 2/2 significant constipation. Patient discharged on aggressive bowel regimen and plan for outpatient colonoscopy. Patient's wife reports that since this discharge, he has been taking miralax and having 2 soft BM per day. She denies any further blood or needing to strain. He has not complained recently of abdominal pain, nausea, change in appetite. Per chart review, patient admitted in 2021 with rectal bleeding, with external hemorrhoids noted on exam and thought to be the source. He has yet to have colonoscopy done. Per staff, patient has not had an bloody BM since last night.      REVIEW OF SYSTEMS:    Unable to complete 2/2 patient's underlying dementia    Historical Information   Past Medical History:   Diagnosis Date    Acute metabolic encephalopathy 06/13/2020    Acute on chronic respiratory failure (HCC) 12/4/2022    Age-related cataract of right eye 4/4/2023    patient is medically cleared and presents with low risk of complication with this upcoming R cataract surgery.    Anemia     Aneurysm, aorta, thoracic (HCC)     Appendicolith     Ascending aortic aneurysm (HCC)     3.7    Asthma     BPH (benign prostatic hyperplasia)     CAD (coronary artery disease)     noted on CT scan    CHF (congestive heart failure) (HCC)     COPD (chronic obstructive pulmonary disease) (HCC)     Descending thoracic aortic aneurysm (HCC)     Diabetes mellitus (HCC)     Diverticulosis     Former tobacco use     GERD (gastroesophageal reflux disease)     History of DVT (deep vein thrombosis)     Left leg    History of transfusion     Hypertension     Inguinal hernia     right    Nephrolithiasis     Oxygen dependent     2LNC    Oxygen dependent     Pneumonia     Pre-diabetes     Prostate calculus     PVD (peripheral vascular disease) (HCC)     Recurrent right inguinal hernia w incarcertion 1/4/2023    Small bowel obstruction (HCC) 12/28/2022    Thoracic aortic  aneurysm without rupture (HCC) 2018    Added automatically from request for surgery [id number]    Thrombocytopenia (HCC) 2018    Ulcer     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)     Varicose vein of leg     b/l     Past Surgical History:   Procedure Laterality Date    CARDIAC SURGERY      ESOPHAGOGASTRODUODENOSCOPY      HERNIA REPAIR Right 2019    Procedure: REPAIR HERNIA INGUINAL WITH MESH;  Surgeon: David Lowry MD;  Location: [facility];  Service: General    HERNIA REPAIR Right 2023    Procedure: REPAIR RECURRENT INCARERATED HERNIA INGUINAL OPEN, RIGHT ORCHIECTOMY;  Surgeon: Mason Bertrand MD;  Location: [facility];  Service: General    INGUINAL HERNIA REPAIR Bilateral     IR TEVAR  2018    LA EVASC RPR DTA COVERAGE ART ORIGIN 1ST ENDOPROSTH N/A 2018    Procedure: TEVAR - endovascular thoracic aortic aneurysm repair;  Surgeon: Gladis Ortega MD;  Location: [facility];  Service: Vascular    THORACIC AORTIC ANEURYSM REPAIR  2018    VARICOSE VEIN SURGERY Bilateral     vein stripping     Social History   Social History     Substance and Sexual Activity   Alcohol Use Never     Social History     Substance and Sexual Activity   Drug Use No     Social History     Tobacco Use   Smoking Status Former    Current packs/day: 0.00    Average packs/day: 1 pack/day for 35.0 years (35.0 ttl pk-yrs)    Types: Cigarettes    Start date:     Quit date:     Years since quittin.0   Smokeless Tobacco Never     Family History   Problem Relation Age of Onset    Tuberculosis Mother     No Known Problems Father     Cancer Sister     Diabetes Family     Hypertension Family        Meds/Allergies     Medications Prior to Admission   Medication    albuterol (2.5 mg/3 mL) 0.083 % nebulizer solution    albuterol (PROVENTIL HFA,VENTOLIN HFA) 90 mcg/act inhaler    budesonide (Pulmicort) 0.5 mg/2 mL nebulizer solution    carbamide peroxide (DEBROX) 6.5 % otic solution     "formoterol (PERFOROMIST) 20 MCG/2ML nebulizer solution    losartan (COZAAR) 25 mg tablet    pantoprazole (PROTONIX) 40 mg tablet    predniSONE 5 mg tablet     Current Facility-Administered Medications   Medication Dose Route Frequency    azithromycin (ZITHROMAX) tablet 500 mg  500 mg Oral Q24H    chlorhexidine (PERIDEX) 0.12 % oral rinse 15 mL  15 mL Mouth/Throat Q12H [facility]    dexmedeTOMIDine (Precedex) 400 mcg in sodium chloride 0.9% 100 mL  0.1-0.7 mcg/kg/hr Intravenous Titrated    hydrALAZINE (APRESOLINE) injection 10 mg  10 mg Intravenous Q6H PRN    hydrOXYzine HCL (ATARAX) tablet 25 mg  25 mg Oral Q6H PRN    ipratropium (ATROVENT) 0.02 % inhalation solution 0.5 mg  0.5 mg Nebulization Q6H    levalbuterol (XOPENEX) inhalation solution 1.25 mg  1.25 mg Nebulization Q6H    losartan (COZAAR) tablet 50 mg  50 mg Oral BID    methylPREDNISolone sodium succinate (Solu-MEDROL) injection 40 mg  40 mg Intravenous Q12H [facility]    OLANZapine (ZyPREXA) 10 mg IM injection **ADS Override Pull**        pantoprazole (PROTONIX) injection 40 mg  40 mg Intravenous Q12H [facility]    polyethylene glycol (MIRALAX) packet 17 g  17 g Oral Daily    QUEtiapine (SEROquel) tablet 25 mg  25 mg Oral HS    senna-docusate sodium (SENOKOT S) 8.6-50 mg per tablet 2 tablet  2 tablet Oral BID       Allergies   Allergen Reactions    Penicillins Hives, Itching and Rash    Lisinopril Rash     Side pains and rash     Zyprexa [Olanzapine] Tongue Swelling           Objective     Blood pressure 137/68, pulse 86, temperature 98.1 °F (36.7 °C), temperature source Axillary, resp. rate (!) 27, height 5' 3\" (1.6 m), weight 60.9 kg (134 lb 4.2 oz), SpO2 96%. Body mass index is 23.78 kg/m².      Intake/Output Summary (Last 24 hours) at 1/4/2024 0852  Last data filed at 1/4/2024 0851  Gross per 24 hour   Intake 127.75 ml   Output 450 ml   Net -322.25 ml         PHYSICAL EXAM:      General Appearance:   Ill-appearing, in NAD   HEENT:   Cataracts    Neck:  Supple, symmetrical, " trachea midline   Lungs:   Wheezing bilaterally, on O2 NC   Heart::   Regular rate and rhythm; no murmur, rub, or gallop.   Abdomen:   Soft, non-tender, non-distended; normal bowel sounds; no masses, no organomegaly    Genitalia:   Deferred    Rectal:   Deferred    Extremities:  No cyanosis, clubbing or edema    Pulses:  2+ and symmetric all extremities    Skin:  No jaundice, rashes, or lesions    Lymph nodes:  No palpable cervical lymphadenopathy        Lab Results:   No results displayed because visit has over 200 results.          Imaging Studies: I have personally reviewed pertinent imaging studies.

## 2024-01-04 NOTE — CASE MANAGEMENT
Case Management Discharge Planning Note    Patient name Severiano Feliciano  Location ICU 13/ICU 13 MRN [id number]  : 1935 Date 2024       Current Admission Date: 2024  Current Admission Diagnosis:Acute on chronic respiratory failure with hypoxia and hypercapnia (HCC)   Patient Active Problem List    Diagnosis Date Noted    Macrocytosis 2023    Aspiration into airway 2023    Constipation 2023    Prediabetes 10/25/2023    Bilateral hearing loss 10/25/2023    PAC (premature atrial contraction) 2023    Hypoxemia 2023    Vitamin B12 deficiency 2023    Vitamin D deficiency 2023    Chronic kidney disease, stage 3a (HCC) 2023    Weight loss 2022    Acute on chronic respiratory failure with hypoxia and hypercapnia (HCC) 2022    Supplemental oxygen dependent 2022    Rectal bleeding 2021    Hyperlipidemia 2021    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    Dysphagia 2020    TIA (transient ischemic attack) 2020    S/P hernia repair 2019    Acquired renal cyst of left kidney 2019    Chronic diastolic CHF (congestive heart failure) (HCC) 2019    Thrombocytopenia (HCC) 2018    Varicose veins of both lower extremities with pain 2018    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HCC) 2018    Chronic respiratory failure with hypoxia (HCC) 2018    Descending aortic aneurysm (HCC) 2018    History of DVT (deep vein thrombosis) 2018    Chronic obstructive pulmonary disease with acute exacerbation (HCC) 2018    Benign essential hypertension 2017      LOS (days): 2  Geometric Mean LOS (GMLOS) (days): 3.6  Days to GMLOS:1.1     OBJECTIVE:  Risk of Unplanned Readmission Score: 26.4         Current admission status: Inpatient   Preferred Pharmacy:   [facility] - [place] - [address]  [address]  [address]  [place]  [place] [address]  Phone: [phone] Fax: [phone]    [facility] - [place] - [address]  [address]  [place] [address]  Phone: [phone] Fax: [phone]    Primary Care Provider: Kirby Casanova MD    Primary Insurance: [employer] [facility]  Secondary Insurance: [facility]    DISCHARGE DETAILS:                                                                                                 Additional Comments: Per nurse, family requesting bipap. CM relayed information to [facility] of need for potential overnight study. Per chart review, during last admission it was noted that although pt would likely benefit from BiPAP, he does not qualify on the basis of the testing done currently. Will continue to follow.

## 2024-01-04 NOTE — QUICK NOTE
Plan to reevaluate for BiPAP as patient did do well overnight  Overnight O2 study, morning ABG ordered

## 2024-01-04 NOTE — ASSESSMENT & PLAN NOTE
MICU Transfer Note  ---------------------------    Transfer from: MICU  Transfer to:  ( X ) Medicine    (  ) Telemetry    (  ) RCU    (  ) Palliative    (  ) Stroke Unit    (  ) _______________  Accepting Physician:  _____      MICU COURSE:  67 y/o F with DM2, hypothyroidism, schizophrenia, prior psychiatric hospitalizations on ZHH, ETOH abuse, Afib on Xarelto, admitted to MICU for hypovolemic and septic shock 2/2 UTI requiring levo. Pt received 4L isotonic fluid resuscitation in ED. Pt clinically improving on CTX 1g QD and has been off pressors since 11am 1/12. Started on 1/2NS in ICU for free water resuscitation afterwards, trending Na from 157 on admission Q6 hours for no greater than 12 meq downtrend over 24 hours. Pt had elevated AG upon arrival, BOHB elevated, likely starvation ketosis as the causative event, pt did not meet criteria for DKA and less likely alcoholic ketoacidosis. Covered with high dose thiamine, folic acid, multivitamin and treated via slow reintroduction of diet with monitoring for refeeding syndrome. Consulted neuro 1/12 for new finding of normal pressure hydrocephalus on CT head, asked to defer consult till pr ready for discharge for outpatient follow up due to requirement of high-volume LP in an outpatient setting. Pt monitored on heparin drip for full AC for Afib, held home AC.     Home Medications:  BENZTROPINE MES 2 MG TABLET:  (11 Jan 2023 15:26)  BUSPIRONE HCL 15 MG TABLET: take 1 tablet by mouth at bedtime (11 Jan 2023 15:26)  CLONAZEPAM 0.5 MG TABLET:  (11 Jan 2023 15:26)  DICLOFENAC SOD  MG TAB:  (11 Jan 2023 15:26)  DILTIAZEM 24H ER(CD) 300 MG CP:  (11 Jan 2023 15:26)  FLUPHENAZINE  MG/5 ML:  (11 Jan 2023 15:26)  LEVOTHYROXINE 50 MCG TABLET: take 1 tablet by mouth once daily (11 Jan 2023 15:26)  LOSARTAN-HCTZ 100-25 MG TAB: take 1 tablet by mouth once daily (11 Jan 2023 15:26)  METFORMIN  MG TABLET: take 1 tablet by mouth once daily (11 Jan 2023 15:26)  METOPROLOL SUCC ER 50 MG TAB:  (11 Jan 2023 15:26)  SIMVASTATIN 20 MG TABLET:  (11 Jan 2023 15:26)  XARELTO 15 MG TABLET: take 1 tablet by mouth twice a day (11 Jan 2023 15:26)    OBJECTIVE --  Vital Signs Last 24 Hrs  T(C): 36.6 (12 Jan 2023 12:00), Max: 37.2 (12 Jan 2023 04:00)  T(F): 97.8 (12 Jan 2023 12:00), Max: 99 (12 Jan 2023 04:00)  HR: 95 (12 Jan 2023 12:00) (79 - 113)  BP: 96/48 (12 Jan 2023 02:00) (74/41 - 166/114)  BP(mean): 61 (12 Jan 2023 02:00) (47 - 127)  RR: 21 (12 Jan 2023 12:00) (16 - 24)  SpO2: 97% (12 Jan 2023 12:00) (94% - 100%)    Parameters below as of 12 Jan 2023 13:00  Patient On (Oxygen Delivery Method): room air        I&O's Summary    11 Jan 2023 07:01  -  12 Jan 2023 07:00  --------------------------------------------------------  IN: 963 mL / OUT: 615 mL / NET: 348 mL    12 Jan 2023 07:01  -  12 Jan 2023 13:34  --------------------------------------------------------  IN: 786 mL / OUT: 560 mL / NET: 226 mL        MEDICATIONS  (STANDING):  atorvastatin 40 milliGRAM(s) Oral at bedtime  benztropine 2 milliGRAM(s) Oral daily  busPIRone 15 milliGRAM(s) Oral every 12 hours  cefTRIAXone   IVPB 1000 milliGRAM(s) IV Intermittent every 24 hours  chlorhexidine 2% Cloths 1 Application(s) Topical daily  chlorhexidine 4% Liquid 1 Application(s) Topical <User Schedule>  clonazePAM  Tablet 0.5 milliGRAM(s) Oral daily  heparin  Infusion. 1200 Unit(s)/Hr (12 mL/Hr) IV Continuous <Continuous>  levothyroxine 50 MICROGram(s) Oral daily  phenylephrine    Infusion 0.2 MICROgram(s)/kG/Min (2.92 mL/Hr) IV Continuous <Continuous>  potassium chloride  10 mEq/100 mL IVPB 10 milliEquivalent(s) IV Intermittent every 1 hour  sodium chloride 0.45%. 1000 milliLiter(s) (250 mL/Hr) IV Continuous <Continuous>  thiamine IVPB 500 milliGRAM(s) IV Intermittent every 8 hours    MEDICATIONS  (PRN):  heparin   Injectable 6500 Unit(s) IV Push every 6 hours PRN For aPTT less than 40  heparin   Injectable 3000 Unit(s) IV Push every 6 hours PRN For aPTT between 40 - 57        LABS                                            12.3                  Neurophils% (auto):   79.1   (01-12 @ 00:25):    17.46)-----------(360          Lymphocytes% (auto):  9.0                                           38.3                   Eosinphils% (auto):   0.0      Manual%: Neutrophils x    ; Lymphocytes x    ; Eosinophils x    ; Bands%: x    ; Blasts x                                    158    |  123    |  119                 Calcium: 10.3  / iCa: x      (01-12 @ 10:05)    ----------------------------<  147       Magnesium: 2.80                             3.3     |  17     |  3.12             Phosphorous: 5.0      TPro  6.2    /  Alb  3.6    /  TBili  0.4    /  DBili  x      /  AST  43     /  ALT  59     /  AlkPhos  75     12 Jan 2023 10:05    ( 01-12 @ 00:25 )   PT: 14.4 sec;   INR: 1.24 ratio  aPTT: 29.4 sec    67 y/o F with DM2, hypothyroidism, schizophrenia, prior psychiatric hospitalizations on ZHH, ETOH abuse, Afib on Xarelto, admitted to MICU for hypovolemic and septic shock 2/2 UTI requiring pressors.     NEURO:  #Mental status: baseline A/Ox3 but lethargic likely 2/2 septic shock requiring pressors  -restart home antipsychotics when pt able to tolerate PO: benztropine, buspirone, clonazepam  -CT head 1/11 shows cerebral volume loss with ventricular prominence which has mildly progressed. The possibility of normal pressure hydrocephalus is raised.  -consulted neuro 1/12, asked to defer consult till discharge for outpatient workup due to requirement of high-volume LP  -per neuro, acute deterioration less likely 2/2 NPH due to long standing nature of ventricle enlargement per CT scan read (last normal CT head in 2019)  -unlikely alcohol withdrawal, pt drinks 1-2 alcohol per day  -F/u ammonia level     CV:  #AFib:   - CHADSVASC score: 3  - a/c: on home Xarelto  - due to significant renal dysfunction hold off on restarting Xarelto, CT head negative for hemorrhagic CVA  - start heparin drip with renal adjustment for initial loading dose   - No hx of TTE, will obtain this admission after adequate volume resuscitation   - Pt currently rate controlled   - resatrt home medications  - Monitor telemetry  - Restart home antiarrhythmics and rate control medications    #Hemodynamically unstable:  - On pressors due to suspected septic shock, goal MAP 75 due to low circulatory volume  - on 1/2 NS 250cc/h for volume resuscitation and correction of hypernatremia    PULM:  No issues on RA, CTM    RENAL:  #KODY: Cr elevated to 5.81, , pt anuric at this time  -likely 2/2 prerenal KODY due to hypotension and suspected poor PO intake in the past 3 days, Cr downtrending, s/p 4L fluid resuscitation in ED  -Katelin: will place for strict I/O in critically ill pt  -EKG negative for ischemic ST-T wave changes  -Replete lytes cautiously, currently     #Elevated AG to 33 upon admission   -likely 2/2 starvation ketoacidosis and less likely alcoholic ketoacidosis. Pt did not meet criteria for diabetic ketoacidosis.   -Will treat for starvation ketoacidosis with high dose thiamine then restarting diet, monitoring for refeeding syndrome    #Hypernatremia  -Na 157 on admission, currently Na 158 s/p 4 L isotonic fluid for fluid resucitation due to profound hypotension from hypovolemia  -Now that pt is off pressors, will treat hypernatremia with hypotonic fluids 1/2NS at 250cc/h  -Trend lytes Q6 monitoring for Na correction no more than 12 meq/ 24 hours    GI:  #Liver enzymes WNL at this time  #Diet: NPO until swallow eval  #Bowel regiment: hold for now    ENDO:  #Hypothyroidism  -Takes synthroid 50 mcg QD at home  -TSH 1.66 on admission, f/u T3 T4    HEMATOLOGIC:  #CBC results show leukocytosis to 20.59, Hgb 15.7, Plt 512 on admission, trend  #Coag panel WNL on admission  #DVT prophylaxis with heparin drip as above    ID:  #UTI  -Pt with UA showing +leuk est (-) nitrites, +.   -In the setting of profound hypotension, tachycardia, hypothermia, will treat for sepsis 2/2 likely UTI  -F/u blood and urine cultures sent to the lab  -s/p CTX 2g in ED, no recent hx of resistant cultures in past 10 years per [place] record  -Will continue CTX 1g QD for empiric coverage for UTI, f/u culture    SKIN:  #Lines: 1 PIVs  #Decubitus ulcers: stage 2 sacral decubitus     For Follow-Up:  [ ] trend hypernatremia- on 1/2 NS for free water and intravascular volume resuscitation. Trend lytes Q6 monitoring for Na correction no more than 12 meq/ 24 hours.  [ ] continue high dose thiamine for questionable nutrition status, states that she takes 1-2 drinks/day  [ ] consulted neuro 1/12, asked to defer consult till pr ready for discharge for outpatient follow up due to requirement of high-volume LP in an outpatient setting. Please set pt up for neurology appt prior to dc for workup  [ ] progress diet when able to tolerate  [ ] restart metoprolol succinate 50 QD and diltiazem 300mg ER QD once pt able to tolerate  [ ] continue CTX for 7 days for UTI, follow up urine culture, can change to oral once pt ready for dc  [ ] monitor on heparin drip for AC, safe to restart Xarelto when Cr clearance improves >30 Past medical history of severe COPD  Continue IV Solu-Medrol 40 mg every 12, Xopenex and Atrovent   MICU Transfer Note  ---------------------------    Transfer from: MICU  Transfer to:  ( X ) Medicine    (  ) Telemetry    (  ) RCU    (  ) Palliative    (  ) Stroke Unit    (  ) _______________  Accepting Physician:  _____      MICU COURSE:  67 y/o F with DM2, hypothyroidism, schizophrenia, prior psychiatric hospitalizations on ZHH, ETOH abuse, Afib on Xarelto, admitted to MICU for hypovolemic and septic shock 2/2 UTI requiring levo. Pt received 4L isotonic fluid resuscitation in ED. Pt clinically improving on CTX 1g QD and has been off pressors since 10am 1/12, not requiring midodrine. Started on 1/2NS in ICU for free water resuscitation afterwards, trending Na from 157 on admission Q6 hours for no greater than 12 meq downtrend over 24 hours. Pt had elevated AG upon arrival, BOHB elevated, likely starvation ketosis as the causative event, pt did not meet criteria for DKA and less likely alcoholic ketoacidosis. Covered with high dose thiamine, folic acid, multivitamin and treated via slow reintroduction of diet with monitoring for refeeding syndrome. Consulted neuro 1/12 for new finding of normal pressure hydrocephalus on CT head, asked to defer consult till pr ready for discharge for outpatient follow up due to requirement of high-volume LP in an outpatient setting. Pt monitored on heparin drip for full AC for Afib, held home AC.     Home Medications:  BENZTROPINE MES 2 MG TABLET:  (11 Jan 2023 15:26)  BUSPIRONE HCL 15 MG TABLET: take 1 tablet by mouth at bedtime (11 Jan 2023 15:26)  CLONAZEPAM 0.5 MG TABLET:  (11 Jan 2023 15:26)  DICLOFENAC SOD  MG TAB:  (11 Jan 2023 15:26)  DILTIAZEM 24H ER(CD) 300 MG CP:  (11 Jan 2023 15:26)  FLUPHENAZINE  MG/5 ML:  (11 Jan 2023 15:26)  LEVOTHYROXINE 50 MCG TABLET: take 1 tablet by mouth once daily (11 Jan 2023 15:26)  LOSARTAN-HCTZ 100-25 MG TAB: take 1 tablet by mouth once daily (11 Jan 2023 15:26)  METFORMIN  MG TABLET: take 1 tablet by mouth once daily (11 Jan 2023 15:26)  METOPROLOL SUCC ER 50 MG TAB:  (11 Jan 2023 15:26)  SIMVASTATIN 20 MG TABLET:  (11 Jan 2023 15:26)  XARELTO 15 MG TABLET: take 1 tablet by mouth twice a day (11 Jan 2023 15:26)    OBJECTIVE --  Vital Signs Last 24 Hrs  T(C): 36.6 (12 Jan 2023 12:00), Max: 37.2 (12 Jan 2023 04:00)  T(F): 97.8 (12 Jan 2023 12:00), Max: 99 (12 Jan 2023 04:00)  HR: 95 (12 Jan 2023 12:00) (79 - 113)  BP: 96/48 (12 Jan 2023 02:00) (74/41 - 166/114)  BP(mean): 61 (12 Jan 2023 02:00) (47 - 127)  RR: 21 (12 Jan 2023 12:00) (16 - 24)  SpO2: 97% (12 Jan 2023 12:00) (94% - 100%)    Parameters below as of 12 Jan 2023 13:00  Patient On (Oxygen Delivery Method): room air        I&O's Summary    11 Jan 2023 07:01  -  12 Jan 2023 07:00  --------------------------------------------------------  IN: 963 mL / OUT: 615 mL / NET: 348 mL    12 Jan 2023 07:01  -  12 Jan 2023 13:34  --------------------------------------------------------  IN: 786 mL / OUT: 560 mL / NET: 226 mL        MEDICATIONS  (STANDING):  atorvastatin 40 milliGRAM(s) Oral at bedtime  benztropine 2 milliGRAM(s) Oral daily  busPIRone 15 milliGRAM(s) Oral every 12 hours  cefTRIAXone   IVPB 1000 milliGRAM(s) IV Intermittent every 24 hours  chlorhexidine 2% Cloths 1 Application(s) Topical daily  chlorhexidine 4% Liquid 1 Application(s) Topical <User Schedule>  clonazePAM  Tablet 0.5 milliGRAM(s) Oral daily  heparin  Infusion. 1200 Unit(s)/Hr (12 mL/Hr) IV Continuous <Continuous>  levothyroxine 50 MICROGram(s) Oral daily  phenylephrine    Infusion 0.2 MICROgram(s)/kG/Min (2.92 mL/Hr) IV Continuous <Continuous>  potassium chloride  10 mEq/100 mL IVPB 10 milliEquivalent(s) IV Intermittent every 1 hour  sodium chloride 0.45%. 1000 milliLiter(s) (250 mL/Hr) IV Continuous <Continuous>  thiamine IVPB 500 milliGRAM(s) IV Intermittent every 8 hours    MEDICATIONS  (PRN):  heparin   Injectable 6500 Unit(s) IV Push every 6 hours PRN For aPTT less than 40  heparin   Injectable 3000 Unit(s) IV Push every 6 hours PRN For aPTT between 40 - 57        LABS                                            12.3                  Neurophils% (auto):   79.1   (01-12 @ 00:25):    17.46)-----------(360          Lymphocytes% (auto):  9.0                                           38.3                   Eosinphils% (auto):   0.0      Manual%: Neutrophils x    ; Lymphocytes x    ; Eosinophils x    ; Bands%: x    ; Blasts x                                    158    |  123    |  119                 Calcium: 10.3  / iCa: x      (01-12 @ 10:05)    ----------------------------<  147       Magnesium: 2.80                             3.3     |  17     |  3.12             Phosphorous: 5.0      TPro  6.2    /  Alb  3.6    /  TBili  0.4    /  DBili  x      /  AST  43     /  ALT  59     /  AlkPhos  75     12 Jan 2023 10:05    ( 01-12 @ 00:25 )   PT: 14.4 sec;   INR: 1.24 ratio  aPTT: 29.4 sec    67 y/o F with DM2, hypothyroidism, schizophrenia, prior psychiatric hospitalizations on ZHH, ETOH abuse, Afib on Xarelto, admitted to MICU for hypovolemic and septic shock 2/2 UTI requiring pressors.     NEURO:  #Mental status: baseline A/Ox3 but lethargic likely 2/2 septic shock requiring pressors  -restart home antipsychotics when pt able to tolerate PO: benztropine, buspirone, clonazepam  -CT head 1/11 shows cerebral volume loss with ventricular prominence which has mildly progressed. The possibility of normal pressure hydrocephalus is raised.  -consulted neuro 1/12, asked to defer consult till discharge for outpatient workup due to requirement of high-volume LP  -per neuro, acute deterioration less likely 2/2 NPH due to long standing nature of ventricle enlargement per CT scan read (last normal CT head in 2019)  -unlikely alcohol withdrawal, pt drinks 1-2 alcohol per day  -F/u ammonia level     CV:  #AFib:   - CHADSVASC score: 3  - a/c: on home Xarelto  - due to significant renal dysfunction hold off on restarting Xarelto, CT head negative for hemorrhagic CVA  - start heparin drip with renal adjustment for initial loading dose   - No hx of TTE, will obtain this admission after adequate volume resuscitation   - Pt currently rate controlled   - resatrt home medications  - Monitor telemetry  - Restart home antiarrhythmics and rate control medications    #Hemodynamically unstable:  - On pressors due to suspected septic shock, goal MAP 75 due to low circulatory volume  - on 1/2 NS 250cc/h for volume resuscitation and correction of hypernatremia    PULM:  No issues on RA, CTM    RENAL:  #KODY: Cr elevated to 5.81, , pt anuric at this time  -likely 2/2 prerenal KODY due to hypotension and suspected poor PO intake in the past 3 days, Cr downtrending, s/p 4L fluid resuscitation in ED  -Katelin: will place for strict I/O in critically ill pt  -EKG negative for ischemic ST-T wave changes  -Replete lytes cautiously, currently     #Elevated AG to 33 upon admission   -likely 2/2 starvation ketoacidosis and less likely alcoholic ketoacidosis. Pt did not meet criteria for diabetic ketoacidosis.   -Will treat for starvation ketoacidosis with high dose thiamine then restarting diet, monitoring for refeeding syndrome    #Hypernatremia  -Na 157 on admission, currently Na 158 s/p 4 L isotonic fluid for fluid resucitation due to profound hypotension from hypovolemia  -Now that pt is off pressors, will treat hypernatremia with hypotonic fluids 1/2NS at 250cc/h  -Trend lytes Q6 monitoring for Na correction no more than 12 meq/ 24 hours    GI:  #Liver enzymes WNL at this time  #Diet: NPO until swallow eval  #Bowel regiment: hold for now    ENDO:  #Hypothyroidism  -Takes synthroid 50 mcg QD at home  -TSH 1.66 on admission, f/u T3 T4    HEMATOLOGIC:  #CBC results show leukocytosis to 20.59, Hgb 15.7, Plt 512 on admission, trend  #Coag panel WNL on admission  #DVT prophylaxis with heparin drip as above    ID:  #UTI  -Pt with UA showing +leuk est (-) nitrites, +.   -In the setting of profound hypotension, tachycardia, hypothermia, will treat for sepsis 2/2 likely UTI  -F/u blood and urine cultures sent to the lab  -s/p CTX 2g in ED, no recent hx of resistant cultures in past 10 years per [facility] record  -Will continue CTX 1g QD for empiric coverage for UTI, f/u culture    SKIN:  #Lines: 1 PIVs  #Decubitus ulcers: stage 2 sacral decubitus     For Follow-Up:  [ ] trend hypernatremia- on 1/2 NS for free water and intravascular volume resuscitation. Trend lytes Q6 monitoring for Na correction no more than 12 meq/ 24 hours.  [ ] continue high dose thiamine for questionable nutrition status, states that she takes 1-2 drinks/day  [ ] consulted neuro 1/12, asked to defer consult till pr ready for discharge for outpatient follow up due to requirement of high-volume LP in an outpatient setting. Please set pt up for neurology appt prior to dc for workup  [ ] progress diet when able to tolerate  [ ] restart metoprolol succinate 50 QD and diltiazem 300mg ER QD once pt able to tolerate  [ ] continue CTX for 7 days for UTI, follow up urine culture, can change to oral once pt ready for dc  [ ] monitor on heparin drip for AC, safe to restart Xarelto when Cr clearance improves >30 MICU Transfer Note  ---------------------------    Transfer from: MICU  Transfer to:  ( X ) Medicine    (  ) Telemetry    (  ) RCU    (  ) Palliative    (  ) Stroke Unit    (  ) _______________  Accepting Physician: Dr. Chidi Jeffrey      MICU COURSE:  65 y/o F with DM2, hypothyroidism, schizophrenia, prior psychiatric hospitalizations on ZHH, ETOH abuse, Afib on Xarelto, admitted to MICU for hypovolemic and septic shock 2/2 UTI requiring levo. Pt received 4L isotonic fluid resuscitation in ED. Pt clinically improving on CTX 1g QD and has been off pressors since 10am 1/12, not requiring midodrine. Started on 1/2NS in ICU for free water resuscitation afterwards, trending Na from 157 on admission Q6 hours for no greater than 12 meq downtrend over 24 hours. Pt had elevated AG upon arrival, BOHB elevated, likely starvation ketosis as the causative event, pt did not meet criteria for DKA and less likely alcoholic ketoacidosis. Covered with high dose thiamine, folic acid, multivitamin and treated via slow reintroduction of diet with monitoring for refeeding syndrome. Consulted neuro 1/12 for new finding of normal pressure hydrocephalus on CT head, asked to defer consult till pr ready for discharge for outpatient follow up due to requirement of high-volume LP in an outpatient setting. Pt monitored on heparin drip for full AC for Afib, held home AC.     Home Medications:  BENZTROPINE MES 2 MG TABLET:  (11 Jan 2023 15:26)  BUSPIRONE HCL 15 MG TABLET: take 1 tablet by mouth at bedtime (11 Jan 2023 15:26)  CLONAZEPAM 0.5 MG TABLET:  (11 Jan 2023 15:26)  DICLOFENAC SOD  MG TAB:  (11 Jan 2023 15:26)  DILTIAZEM 24H ER(CD) 300 MG CP:  (11 Jan 2023 15:26)  FLUPHENAZINE  MG/5 ML:  (11 Jan 2023 15:26)  LEVOTHYROXINE 50 MCG TABLET: take 1 tablet by mouth once daily (11 Jan 2023 15:26)  LOSARTAN-HCTZ 100-25 MG TAB: take 1 tablet by mouth once daily (11 Jan 2023 15:26)  METFORMIN  MG TABLET: take 1 tablet by mouth once daily (11 Jan 2023 15:26)  METOPROLOL SUCC ER 50 MG TAB:  (11 Jan 2023 15:26)  SIMVASTATIN 20 MG TABLET:  (11 Jan 2023 15:26)  XARELTO 15 MG TABLET: take 1 tablet by mouth twice a day (11 Jan 2023 15:26)    OBJECTIVE --  Vital Signs Last 24 Hrs  T(C): 36.6 (12 Jan 2023 12:00), Max: 37.2 (12 Jan 2023 04:00)  T(F): 97.8 (12 Jan 2023 12:00), Max: 99 (12 Jan 2023 04:00)  HR: 95 (12 Jan 2023 12:00) (79 - 113)  BP: 96/48 (12 Jan 2023 02:00) (74/41 - 166/114)  BP(mean): 61 (12 Jan 2023 02:00) (47 - 127)  RR: 21 (12 Jan 2023 12:00) (16 - 24)  SpO2: 97% (12 Jan 2023 12:00) (94% - 100%)    Parameters below as of 12 Jan 2023 13:00  Patient On (Oxygen Delivery Method): room air        I&O's Summary    11 Jan 2023 07:01  -  12 Jan 2023 07:00  --------------------------------------------------------  IN: 963 mL / OUT: 615 mL / NET: 348 mL    12 Jan 2023 07:01  -  12 Jan 2023 13:34  --------------------------------------------------------  IN: 786 mL / OUT: 560 mL / NET: 226 mL        MEDICATIONS  (STANDING):  atorvastatin 40 milliGRAM(s) Oral at bedtime  benztropine 2 milliGRAM(s) Oral daily  busPIRone 15 milliGRAM(s) Oral every 12 hours  cefTRIAXone   IVPB 1000 milliGRAM(s) IV Intermittent every 24 hours  chlorhexidine 2% Cloths 1 Application(s) Topical daily  chlorhexidine 4% Liquid 1 Application(s) Topical <User Schedule>  clonazePAM  Tablet 0.5 milliGRAM(s) Oral daily  heparin  Infusion. 1200 Unit(s)/Hr (12 mL/Hr) IV Continuous <Continuous>  levothyroxine 50 MICROGram(s) Oral daily  phenylephrine    Infusion 0.2 MICROgram(s)/kG/Min (2.92 mL/Hr) IV Continuous <Continuous>  potassium chloride  10 mEq/100 mL IVPB 10 milliEquivalent(s) IV Intermittent every 1 hour  sodium chloride 0.45%. 1000 milliLiter(s) (250 mL/Hr) IV Continuous <Continuous>  thiamine IVPB 500 milliGRAM(s) IV Intermittent every 8 hours    MEDICATIONS  (PRN):  heparin   Injectable 6500 Unit(s) IV Push every 6 hours PRN For aPTT less than 40  heparin   Injectable 3000 Unit(s) IV Push every 6 hours PRN For aPTT between 40 - 57        LABS                                            12.3                  Neurophils% (auto):   79.1   (01-12 @ 00:25):    17.46)-----------(360          Lymphocytes% (auto):  9.0                                           38.3                   Eosinphils% (auto):   0.0      Manual%: Neutrophils x    ; Lymphocytes x    ; Eosinophils x    ; Bands%: x    ; Blasts x                                    158    |  123    |  119                 Calcium: 10.3  / iCa: x      (01-12 @ 10:05)    ----------------------------<  147       Magnesium: 2.80                             3.3     |  17     |  3.12             Phosphorous: 5.0      TPro  6.2    /  Alb  3.6    /  TBili  0.4    /  DBili  x      /  AST  43     /  ALT  59     /  AlkPhos  75     12 Jan 2023 10:05    ( 01-12 @ 00:25 )   PT: 14.4 sec;   INR: 1.24 ratio  aPTT: 29.4 sec    65 y/o F with DM2, hypothyroidism, schizophrenia, prior psychiatric hospitalizations on ZHH, ETOH abuse, Afib on Xarelto, admitted to MICU for hypovolemic and septic shock 2/2 UTI requiring pressors.     NEURO:  #Mental status: baseline A/Ox3 but lethargic likely 2/2 septic shock requiring pressors  -restart home antipsychotics when pt able to tolerate PO: benztropine, buspirone, clonazepam  -CT head 1/11 shows cerebral volume loss with ventricular prominence which has mildly progressed. The possibility of normal pressure hydrocephalus is raised.  -consulted neuro 1/12, asked to defer consult till discharge for outpatient workup due to requirement of high-volume LP  -per neuro, acute deterioration less likely 2/2 NPH due to long standing nature of ventricle enlargement per CT scan read (last normal CT head in 2019)  -unlikely alcohol withdrawal, pt drinks 1-2 alcohol per day  -F/u ammonia level     CV:  #AFib:   - CHADSVASC score: 3  - a/c: on home Xarelto  - due to significant renal dysfunction hold off on restarting Xarelto, CT head negative for hemorrhagic CVA  - start heparin drip with renal adjustment for initial loading dose   - No hx of TTE, will obtain this admission after adequate volume resuscitation   - Pt currently rate controlled   - resatrt home medications  - Monitor telemetry  - Restart home antiarrhythmics and rate control medications    #Hemodynamically unstable:  - On pressors due to suspected septic shock, goal MAP 75 due to low circulatory volume  - on 1/2 NS 250cc/h for volume resuscitation and correction of hypernatremia    PULM:  No issues on RA, CTM    RENAL:  #KODY: Cr elevated to 5.81, , pt anuric at this time  -likely 2/2 prerenal KODY due to hypotension and suspected poor PO intake in the past 3 days, Cr downtrending, s/p 4L fluid resuscitation in ED  -[place]: will place for strict I/O in critically ill pt  -EKG negative for ischemic ST-T wave changes  -Replete lytes cautiously, currently     #Elevated AG to 33 upon admission   -likely 2/2 starvation ketoacidosis and less likely alcoholic ketoacidosis. Pt did not meet criteria for diabetic ketoacidosis.   -Will treat for starvation ketoacidosis with high dose thiamine then restarting diet, monitoring for refeeding syndrome    #Hypernatremia  -Na 157 on admission, currently Na 158 s/p 4 L isotonic fluid for fluid resucitation due to profound hypotension from hypovolemia  -Now that pt is off pressors, will treat hypernatremia with hypotonic fluids 1/2NS at 250cc/h  -Trend lytes Q6 monitoring for Na correction no more than 12 meq/ 24 hours    GI:  #Liver enzymes WNL at this time  #Diet: NPO until swallow eval  #Bowel regiment: hold for now    ENDO:  #Hypothyroidism  -Takes synthroid 50 mcg QD at home  -TSH 1.66 on admission, f/u T3 T4    HEMATOLOGIC:  #CBC results show leukocytosis to 20.59, Hgb 15.7, Plt 512 on admission, trend  #Coag panel WNL on admission  #DVT prophylaxis with heparin drip as above    ID:  #UTI  -Pt with UA showing +leuk est (-) nitrites, +.   -In the setting of profound hypotension, tachycardia, hypothermia, will treat for sepsis 2/2 likely UTI  -F/u blood and urine cultures sent to the lab  -s/p CTX 2g in ED, no recent hx of resistant cultures in past 10 years per [place] record  -Will continue CTX 1g QD for empiric coverage for UTI, f/u culture    SKIN:  #Lines: 1 PIVs  #Decubitus ulcers: stage 2 sacral decubitus     For Follow-Up:  [ ] trend hypernatremia- on 1/2 NS for free water and intravascular volume resuscitation. Trend lytes Q6 monitoring for Na correction no more than 12 meq/ 24 hours.  [ ] continue high dose thiamine for questionable nutrition status, states that she takes 1-2 drinks/day  [ ] consulted neuro 1/12, asked to defer consult till pr ready for discharge for outpatient follow up due to requirement of high-volume LP in an outpatient setting. Please set pt up for neurology appt prior to dc for workup  [ ] progress diet when able to tolerate  [ ] restart metoprolol succinate 50 QD and diltiazem 300mg ER QD once pt able to tolerate  [ ] continue CTX for 7 days for UTI, follow up urine culture, can change to oral once pt ready for dc  [ ] monitor on heparin drip for AC, safe to restart Xarelto when Cr clearance improves >30

## 2024-01-04 NOTE — PHYSICAL THERAPY NOTE
PT EVALUATION    Pt. Name: Severiano Feliciano  Pt. Age: 88 y.o.  MRN: [id number]  LENGTH OF STAY: 2    Patient Active Problem List   Diagnosis    Descending aortic aneurysm (HCC)    History of DVT (deep vein thrombosis)    Benign essential hypertension    Chronic respiratory failure with hypoxia (HCC)    Varicose veins of both lower extremities with pain    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HCC)    Thrombocytopenia (HCC)    Chronic diastolic CHF (congestive heart failure) (HCC)    Acquired renal cyst of left kidney    TIA (transient ischemic attack)    Dysphagia    Metabolic encephalopathy    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)    Rectal bleeding    Hyperlipidemia    Acute on chronic respiratory failure with hypoxia and hypercapnia (HCC)    Weight loss    S/P hernia repair    Supplemental oxygen dependent    Chronic kidney disease, stage 3a (HCC)    Vitamin B12 deficiency    Vitamin D deficiency    Hypoxemia    Chronic obstructive pulmonary disease with acute exacerbation (HCC)    PAC (premature atrial contraction)    Prediabetes    Bilateral hearing loss    Constipation    Aspiration into airway    Macrocytosis       Admitting Diagnoses:   Altered mental status [R41.82]  SOB (shortness of breath) [R06.02]  Acute on chronic respiratory failure with hypercapnia (HCC) [J96.22]    Past Medical History:   Diagnosis Date    Acute metabolic encephalopathy 06/13/2020    Acute on chronic respiratory failure (HCC) 12/4/2022    Age-related cataract of right eye 4/4/2023    patient is medically cleared and presents with low risk of complication with this upcoming R cataract surgery.    Anemia     Aneurysm, aorta, thoracic (HCC)     Appendicolith     Ascending aortic aneurysm (HCC)     3.7    Asthma     BPH (benign prostatic hyperplasia)     CAD (coronary artery disease)     noted on CT scan    CHF (congestive heart failure) (HCC)     COPD (chronic obstructive pulmonary disease) (HCC)      Descending thoracic aortic aneurysm (HCC)     Diabetes mellitus (HCC)     Diverticulosis     Former tobacco use     GERD (gastroesophageal reflux disease)     History of DVT (deep vein thrombosis)     Left leg    History of transfusion     Hypertension     Inguinal hernia     right    Nephrolithiasis     Oxygen dependent     2LNC    Oxygen dependent     Pneumonia     Pre-diabetes     Prostate calculus     PVD (peripheral vascular disease) (HCC)     Recurrent right inguinal hernia w incarcertion 1/4/2023    Small bowel obstruction (HCC) 12/28/2022    Thoracic aortic aneurysm without rupture (HCC) 11/19/2018    Added automatically from request for surgery [id number]    Thrombocytopenia (HCC) 12/29/2018    Ulcer     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)     Varicose vein of leg     b/l       Past Surgical History:   Procedure Laterality Date    CARDIAC SURGERY      ESOPHAGOGASTRODUODENOSCOPY      HERNIA REPAIR Right 1/21/2019    Procedure: REPAIR HERNIA INGUINAL WITH MESH;  Surgeon: David Lowry MD;  Location:  MAIN OR;  Service: General    HERNIA REPAIR Right 7/22/2023    Procedure: REPAIR RECURRENT INCARERATED HERNIA INGUINAL OPEN, RIGHT ORCHIECTOMY;  Surgeon: Mason Bertrand MD;  Location: [facility];  Service: General    INGUINAL HERNIA REPAIR Bilateral     IR TEVAR  12/27/2018    ND EVASC RPR DTA COVERAGE ART ORIGIN 1ST ENDOPROSTH N/A 12/27/2018    Procedure: TEVAR - endovascular thoracic aortic aneurysm repair;  Surgeon: Gladis Ortega MD;  Location: [facility];  Service: Vascular    THORACIC AORTIC ANEURYSM REPAIR  12/27/2018    VARICOSE VEIN SURGERY Bilateral     vein stripping       Imaging Studies:  XR chest 1 view portable   Final Result by Memo Isaac MD (01/02 1131)      Bibasilar atelectasis with mild elevation of the left hemidiaphragm.                  Workstation performed: [id number]              01/04/24 1548   PT Last Visit   PT Visit Date 01/04/24   Note Type   Note type  Evaluation   Pain Assessment   Pain Assessment Tool 0-10   Pain Score No Pain   Restrictions/Precautions   Weight Bearing Precautions Per Order No   Other Precautions Chair Alarm;Bed Alarm;Multiple lines;Telemetry;O2;Fall Risk;Hard of hearing  (5L O2 NC)   Home Living   Type of Home House   Home Layout Two level;Stairs to enter with rails  (5 SHYANNE with hand rails; lives on 2nd floor; FOS to 2nd floor)   Bathroom Shower/Tub Tub/shower unit   Bathroom Toilet Standard   Bathroom Equipment Shower chair   Home Equipment Cane;Wheelchair-manual  (daughter unsure if pt has a RW)   Additional Comments Information gathered from pts daughter via cellphone.   Prior Function   Level of [place] Needs assistance with ADLs;Needs assistance with functional mobility;Needs assistance with IADLS  (w/ SPC)   Lives With Spouse  (daughter is staying with pt for a few days)   Receives Help From Family   IADLs Family/Friend/Other provides transportation;Family/Friend/Other provides meals;Family/Friend/Other provides medication management   Falls in the last 6 months 0   General   Family/Caregiver Present Yes   Cognition   Overall Cognitive Status Impaired   Attention Attends with cues to redirect   Following Commands Follows one step commands with increased time or repetition   Comments Habematolel   Subjective   Subjective Pt agreeable to PT evaluation.   RUE Assessment   RUE Assessment WFL  (3+/5 grossly)   LUE Assessment   LUE Assessment WFL  (3+/5 grossly)   RLE Assessment   RLE Assessment WFL  (4-/5 grossly)   LLE Assessment   LLE Assessment WFL  (4-/5 grossly)   Bed Mobility   Additional Comments Pt greeted OOB in chair. Pt OOB in chair at end of session.   Transfers   Sit to Stand 4  Minimal assistance   Additional items Assist x 1;Armrests;Increased time required;Verbal cues   Stand to Sit 4  Minimal assistance   Additional items Assist x 1;Armrests;Increased time required;Verbal cues   Additional Comments cues for hand placement    Ambulation/Elevation   Gait pattern Forward Flexion;Decreased foot clearance;Short stride;Excessively slow;Step through pattern   Gait Assistance 4  Minimal assist   Additional items Assist x 1;Verbal cues;Tactile cues   Assistive Device Rolling walker   Distance 40'x1   Ambulation/Elevation Additional Comments cues for RW management and oxygen line management   Balance   Static Sitting Fair +   Dynamic Sitting Fair   Static Standing Fair -   Dynamic Standing Poor +   Ambulatory Poor +   Endurance Deficit   Endurance Deficit Yes   Endurance Deficit Description fatigue and weakness   Activity Tolerance   Activity Tolerance Patient limited by fatigue   Nurse Made Aware SCHUYLER Summers   Assessment   Prognosis Good   Problem List Decreased strength;Decreased endurance;Impaired balance;Decreased mobility;Decreased safety awareness;Impaired hearing   Assessment Pt. 88 y.o.male presents with SOB. Past medical hx includes COPD, stage III CKD, chronic respiratory failure on oxygen, hypertension, history of DVT. Pt admitted for Acute on chronic respiratory failure with hypoxia and hypercapnia (HCC) w/ Altered mental status, SOB, Acute on chronic respiratory failure with hypercapnia. Pt referred to PT for functional mobility evaluation & D/C planning w/ orders of  OOB to chair . Pt greeted in bedside chair. PTA, pt reports needing A with IADLs, (A) w/ ADL's, (A) w/ amb, and use of SPC for functional mobility . Pt currently on 5L O2 NC; use of 2-3 L O2 at home. No pain reported. Information gathered from pts daughter via cell phone 2* pt [place] speaking only. Required minAx1 for sit<>stand, and ambulation. Pt able to ambulate 10' with RW and minAx1 in room. Cues for RW management and hand placement. Assistance with O2 tubing. No gross LOB noted however forward flexed and dec foot clearance. Pt demonstrated dec endurance and tolerance to activity. Denies reports of dizziness or SOB t/o session. Pt was educated on fall precautions  and reinforced w/ good understanding. Pt would benefit from continued PT to address deficits as defined above and maximize level of independence with functional mobility and safety. Based on pt presentation and impaired function, pt would benefit from level II, (moderate resource intensity) at D/C. The patient's [facility] Basic Mobility Inpatient Short Form Raw Score is 16. A Raw score of less than or equal to 16 suggests the patient may benefit from discharge to post-acute rehabilitation services. Please also refer to the recommendation of the Physical Therapist for safe discharge planning. Pt would benefit from RW at D/C however daughter unsure if pt already owns RW. CM to follow. Nsg staff to continue to mobilized pt (OOB in chair for all meals & ambulate in room/unit) as tolerated to prevent further decline in function. Nsg notified.   Goals   Patient Goals none stated   STG Expiration Date 01/18/24   Short Term Goal #1 1) Inc overall LE strength by 1/2 MMT grade to improve functional mobility; 2) Pt will demonstrate improved bed mobility with S to dec caregiver burden; 3) Pt will demonstrate improved transfers w/ S for inc safety; 4) Pt will be able to amb w/ S >150' w/ RW for household distances to inc safety and dec caregiver burden; 5) Pt will be able to navigate stairs with S to dec caregiver burden and inc safety with functional mobility; 6) Improve general balance by 1 grade to inc safety; 7) PT for ongoing patient and caregiver education   PT Treatment Day 0   Plan   Treatment/Interventions Functional transfer training;LE strengthening/ROM;Elevations;Therapeutic exercise;Endurance training;Patient/family training;Bed mobility;Gait training;Spoke to nursing;OT   PT Frequency 3-5x/wk   Discharge Recommendation   Rehab Resource Intensity Level, PT II (Moderate Resource Intensity)   Equipment Recommended Walker  (need to clarify if pt owns walker)   [facility] Basic Mobility Inpatient   Turning in Flat Bed Without  Bedrails 3   Lying on Back to Sitting on Edge of Flat Bed Without Bedrails 2   Moving Bed to Chair 3   Standing Up From Chair Using Arms 3   Walk in Room 3   Climb 3-5 Stairs With Railing 2   Basic Mobility Inpatient Raw Score 16   Basic Mobility Standardized Score 38.32   Highest Level Of Mobility   -[facility] Goal 5: Stand one or more mins   JH-HLM Achieved 6: Walk 10 steps or more   End of Consult   Patient Position at End of Consult Bedside chair;Bed/Chair alarm activated;All needs within reach   End of Consult Comments Pt in stable condition at end of session. RN notified.   Hx/personal factors: co-morbidities, inaccessible home, dec caregiver support, advanced age, mutliple lines, telemetry, use of AD, dec cognition, fall risk, assist w/ ADL's, and O2, coping styles, social background, past experience, behavior pattern,  Examination: dec mobility, dec balance, dec endurance, dec amb, risk for falls, dec cognition, assessed body system, balance, endurance, amb, D/C disposition & fall risk, impairements in locomotion, musculoskeletal, balance, endurance, posture, coordination, impairments in systems including multiple body structures involved; musculoskeletal (ROM, strength, posture, BMI), neuromuscular (balance,locomotion, gait, transfers, motor control and learning, sensation), joint integrity, integumentary (skin integrity, presence of scars or wounds), cardiopulmonary (vitals, edema), cognition; activity limitations (difficulties executing an action); participation restrictions (problems associated w involvement in life situations)  Clinical: unpredictable (ongoing medical status, abnormal lab values, risk for falls, and pain mgt)  Complexity: high        Claudia Bacon, PT

## 2024-01-04 NOTE — ASSESSMENT & PLAN NOTE
In the setting of severe COPD exacerbation  Has been noncompliant with BiPAP  Initially monitored in ICU, for transfer out on 01/03  Continue IV steroids per pulmonology, nebs  Encourage utilizing BiPAP at bedtime  Currently on 5 L nasal cannula, wean down to 4 L today will plan to transition back to baseline of 2 to 3 L NC  Palliative care consulted, for further goals of care discussion

## 2024-01-04 NOTE — PLAN OF CARE
Problem: Potential for Falls  Goal: Patient will remain free of falls  Description: INTERVENTIONS:  - Educate patient/family on patient safety including physical limitations  - Instruct patient to call for assistance with activity   - Consult OT/PT to assist with strengthening/mobility   - Keep Call bell within reach  - Keep bed low and locked with side rails adjusted as appropriate  - Keep care items and personal belongings within reach  - Initiate and maintain comfort rounds  - Make Fall Risk Sign visible to staff  - Offer Toileting every  Hours, in advance of need  - Initiate/Maintain alarm  - Obtain necessary fall risk management equipment:   - Apply yellow socks and bracelet for high fall risk patients  - Consider moving patient to room near nurses station  Outcome: Progressing     Problem: PAIN - ADULT  Goal: Verbalizes/displays adequate comfort level or baseline comfort level  Description: Interventions:  - Encourage patient to monitor pain and request assistance  - Assess pain using appropriate pain scale  - Administer analgesics based on type and severity of pain and evaluate response  - Implement non-pharmacological measures as appropriate and evaluate response  - Consider cultural and social influences on pain and pain management  - Notify physician/advanced practitioner if interventions unsuccessful or patient reports new pain  Outcome: Progressing     Problem: INFECTION - ADULT  Goal: Absence or prevention of progression during hospitalization  Description: INTERVENTIONS:  - Assess and monitor for signs and symptoms of infection  - Monitor lab/diagnostic results  - Monitor all insertion sites, i.e. indwelling lines, tubes, and drains  - Monitor endotracheal if appropriate and nasal secretions for changes in amount and color  - [place] appropriate cooling/warming therapies per order  - Administer medications as ordered  - Instruct and encourage patient and family to use good hand hygiene technique  -  Identify and instruct in appropriate isolation precautions for identified infection/condition  Outcome: Progressing  Goal: Absence of fever/infection during neutropenic period  Description: INTERVENTIONS:  - Monitor WBC    Outcome: Progressing     Problem: SAFETY ADULT  Goal: Patient will remain free of falls  Description: INTERVENTIONS:  - Educate patient/family on patient safety including physical limitations  - Instruct patient to call for assistance with activity   - Consult OT/PT to assist with strengthening/mobility   - Keep Call bell within reach  - Keep bed low and locked with side rails adjusted as appropriate  - Keep care items and personal belongings within reach  - Initiate and maintain comfort rounds  - Make Fall Risk Sign visible to staff  - Offer Toileting every  Hours, in advance of need  - Initiate/Maintain alarm  - Obtain necessary fall risk management equipment:   - Apply yellow socks and bracelet for high fall risk patients  - Consider moving patient to room near nurses station  Outcome: Progressing  Goal: Maintain or return to baseline ADL function  Description: INTERVENTIONS:  -  Assess patient's ability to carry out ADLs; assess patient's baseline for ADL function and identify physical deficits which impact ability to perform ADLs (bathing, care of mouth/teeth, toileting, grooming, dressing, etc.)  - Assess/evaluate cause of self-care deficits   - Assess range of motion  - Assess patient's mobility; develop plan if impaired  - Assess patient's need for assistive devices and provide as appropriate  - Encourage maximum independence but intervene and supervise when necessary  - Involve family in performance of ADLs  - Assess for home care needs following discharge   - Consider OT consult to assist with ADL evaluation and planning for discharge  - Provide patient education as appropriate  Outcome: Progressing  Goal: Maintains/Returns to pre admission functional level  Description:  INTERVENTIONS:  - Perform AM-PAC 6 Click Basic Mobility/ Daily Activity assessment daily.  - Set and communicate daily mobility goal to care team and patient/family/caregiver.   - Collaborate with rehabilitation services on mobility goals if consulted  - Perform Range of Motion  times a day.  - Reposition patient every  hours.  - Dangle patient  times a day  - Stand patient  times a day  - Ambulate patient  times a day  - Out of bed to chair  times a day   - Out of bed for meals times a day  - Out of bed for toileting  - Record patient progress and toleration of activity level   Outcome: Progressing     Problem: DISCHARGE PLANNING  Goal: Discharge to home or other facility with appropriate resources  Description: INTERVENTIONS:  - Identify barriers to discharge w/patient and caregiver  - Arrange for needed discharge resources and transportation as appropriate  - Identify discharge learning needs (meds, wound care, etc.)  - Arrange for interpretive services to assist at discharge as needed  - Refer to Case Management Department for coordinating discharge planning if the patient needs post-hospital services based on physician/advanced practitioner order or complex needs related to functional status, cognitive ability, or social support system  Outcome: Progressing     Problem: Knowledge Deficit  Goal: Patient/family/caregiver demonstrates understanding of disease process, treatment plan, medications, and discharge instructions  Description: Complete learning assessment and assess knowledge base.  Interventions:  - Provide teaching at level of understanding  - Provide teaching via preferred learning methods  Outcome: Progressing     Problem: RESPIRATORY - ADULT  Goal: Achieves optimal ventilation and oxygenation  Description: INTERVENTIONS:  - Assess for changes in respiratory status  - Assess for changes in mentation and behavior  - Position to facilitate oxygenation and minimize respiratory effort  - Oxygen  administered by appropriate delivery if ordered  - Initiate smoking cessation education as indicated  - Encourage broncho-pulmonary hygiene including cough, deep breathe, Incentive Spirometry  - Assess the need for suctioning and aspirate as needed  - Assess and instruct to report SOB or any respiratory difficulty  - Respiratory Therapy support as indicated  Outcome: Progressing     Problem: Prexisting or High Potential for Compromised Skin Integrity  Goal: Skin integrity is maintained or improved  Description: INTERVENTIONS:  - Identify patients at risk for skin breakdown  - Assess and monitor skin integrity  - Assess and monitor nutrition and hydration status  - Monitor labs   - Assess for incontinence   - Turn and reposition patient  - Assist with mobility/ambulation  - Relieve pressure over bony prominences  - Avoid friction and shearing  - Provide appropriate hygiene as needed including keeping skin clean and dry  - Evaluate need for skin moisturizer/barrier cream  - Collaborate with interdisciplinary team   - Patient/family teaching  - Consider wound care consult   Outcome: Progressing     Problem: METABOLIC, FLUID AND ELECTROLYTES - ADULT  Goal: Electrolytes maintained within normal limits  Description: INTERVENTIONS:  - Monitor labs and assess patient for signs and symptoms of electrolyte imbalances  - Administer electrolyte replacement as ordered  - Monitor response to electrolyte replacements, including repeat lab results as appropriate  - Instruct patient on fluid and nutrition as appropriate  Outcome: Progressing  Goal: Fluid balance maintained  Description: INTERVENTIONS:  - Monitor labs   - Monitor I/O and WT  - Instruct patient on fluid and nutrition as appropriate  - Assess for signs & symptoms of volume excess or deficit  Outcome: Progressing     Problem: HEMATOLOGIC - ADULT  Goal: Maintains hematologic stability  Description: INTERVENTIONS  - Assess for signs and symptoms of bleeding or  hemorrhage  - Monitor labs  - Administer supportive blood products/factors as ordered and appropriate  Outcome: Progressing     Problem: Nutrition/Hydration-ADULT  Goal: Nutrient/Hydration intake appropriate for improving, restoring or maintaining nutritional needs  Description: Monitor and assess patient's nutrition/hydration status for malnutrition. Collaborate with interdisciplinary team and initiate plan and interventions as ordered.  Monitor patient's weight and dietary intake as ordered or per policy. Utilize nutrition screening tool and intervene as necessary. Determine patient's food preferences and provide high-protein, high-caloric foods as appropriate.     INTERVENTIONS:  - Monitor oral intake, urinary output, labs, and treatment plans  - Assess nutrition and hydration status and recommend course of action  - Evaluate amount of meals eaten  - Assist patient with eating if necessary   - Allow adequate time for meals  - Recommend/ encourage appropriate diets, oral nutritional supplements, and vitamin/mineral supplements  - Order, calculate, and assess calorie counts as needed  - Recommend, monitor, and adjust tube feedings and TPN/PPN based on assessed needs  - Assess need for intravenous fluids  - Provide specific nutrition/hydration education as appropriate  - Include patient/family/caregiver in decisions related to nutrition  Outcome: Progressing

## 2024-01-04 NOTE — QUICK NOTE
Nursing reached out that patient had small bloody bowel movement with zayda blood with some darker red blood.   Continue to monitor stools  GI consult pending     2140  Nursing reached out that patient again with bloody bowel movement with mixed component of bright red blood and dark red blood  Will check H&H and type and screen now  NPO at midnight   Hold DVT prophylaxis   IV protonix  GI consult pending

## 2024-01-04 NOTE — ASSESSMENT & PLAN NOTE
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) WRITTEN ON 1/4/2024  1:27 PM BY GILDA MILLER MD    In the setting of severe COPD exacerbation  Has been noncompliant with BiPAP  Initially monitored in ICU, for transfer out on 01/03  Continue IV steroids per pulmonology, nebs  Encourage utilizing BiPAP at bedtime  Currently on 5 L nasal cannula, wean down to 4 L today will plan to transition back to baseline of 2 to 3 L NC  Palliative care consulted, for further goals of care discussion

## 2024-01-05 DIAGNOSIS — I50.32 CHRONIC DIASTOLIC CHF (CONGESTIVE HEART FAILURE) (HCC): ICD-10-CM

## 2024-01-05 DIAGNOSIS — F03.90 DEMENTIA (HCC): ICD-10-CM

## 2024-01-05 DIAGNOSIS — J96.21 ACUTE ON CHRONIC RESPIRATORY FAILURE WITH HYPOXIA AND HYPERCAPNIA (HCC): Primary | ICD-10-CM

## 2024-01-05 DIAGNOSIS — R13.19 OTHER DYSPHAGIA: ICD-10-CM

## 2024-01-05 DIAGNOSIS — J96.22 ACUTE ON CHRONIC RESPIRATORY FAILURE WITH HYPOXIA AND HYPERCAPNIA (HCC): Primary | ICD-10-CM

## 2024-01-05 DIAGNOSIS — J96.11 CHRONIC RESPIRATORY FAILURE WITH HYPOXIA (HCC): ICD-10-CM

## 2024-01-05 DIAGNOSIS — J96.11 CHRONIC RESPIRATORY FAILURE WITH HYPOXIA (HCC): Primary | ICD-10-CM

## 2024-01-05 DIAGNOSIS — T17.908A ASPIRATION INTO AIRWAY, INITIAL ENCOUNTER: ICD-10-CM

## 2024-01-05 LAB
ANION GAP SERPL CALCULATED.3IONS-SCNC: 3 MMOL/L
BASOPHILS # BLD AUTO: 0.01 THOUSANDS/ÂΜL (ref 0–0.1)
BASOPHILS NFR BLD AUTO: 0 % (ref 0–1)
BUN SERPL-MCNC: 31 MG/DL (ref 5–25)
CALCIUM SERPL-MCNC: 9.5 MG/DL (ref 8.4–10.2)
CHLORIDE SERPL-SCNC: 97 MMOL/L (ref 96–108)
CO2 SERPL-SCNC: 43 MMOL/L (ref 21–32)
CREAT SERPL-MCNC: 0.87 MG/DL (ref 0.6–1.3)
EOSINOPHIL # BLD AUTO: 0 THOUSAND/ÂΜL (ref 0–0.61)
EOSINOPHIL NFR BLD AUTO: 0 % (ref 0–6)
ERYTHROCYTE [DISTWIDTH] IN BLOOD BY AUTOMATED COUNT: 13.3 % (ref 11.6–15.1)
GFR SERPL CREATININE-BSD FRML MDRD: 78 ML/MIN/1.73SQ M
GLUCOSE SERPL-MCNC: 163 MG/DL (ref 65–140)
HCT VFR BLD AUTO: 41 % (ref 36.5–49.3)
HGB BLD-MCNC: 12.4 G/DL (ref 12–17)
IMM GRANULOCYTES # BLD AUTO: 0.05 THOUSAND/UL (ref 0–0.2)
IMM GRANULOCYTES NFR BLD AUTO: 1 % (ref 0–2)
LYMPHOCYTES # BLD AUTO: 0.56 THOUSANDS/ÂΜL (ref 0.6–4.47)
LYMPHOCYTES NFR BLD AUTO: 6 % (ref 14–44)
MAGNESIUM SERPL-MCNC: 2.2 MG/DL (ref 1.9–2.7)
MCH RBC QN AUTO: 31.2 PG (ref 26.8–34.3)
MCHC RBC AUTO-ENTMCNC: 30.2 G/DL (ref 31.4–37.4)
MCV RBC AUTO: 103 FL (ref 82–98)
MONOCYTES # BLD AUTO: 0.56 THOUSAND/ÂΜL (ref 0.17–1.22)
MONOCYTES NFR BLD AUTO: 6 % (ref 4–12)
NEUTROPHILS # BLD AUTO: 8.46 THOUSANDS/ÂΜL (ref 1.85–7.62)
NEUTS SEG NFR BLD AUTO: 87 % (ref 43–75)
NRBC BLD AUTO-RTO: 0 /100 WBCS
PLATELET # BLD AUTO: 132 THOUSANDS/UL (ref 149–390)
PMV BLD AUTO: 9.1 FL (ref 8.9–12.7)
POTASSIUM SERPL-SCNC: 4.2 MMOL/L (ref 3.5–5.3)
RBC # BLD AUTO: 3.97 MILLION/UL (ref 3.88–5.62)
SODIUM SERPL-SCNC: 143 MMOL/L (ref 135–147)
WBC # BLD AUTO: 9.64 THOUSAND/UL (ref 4.31–10.16)

## 2024-01-05 PROCEDURE — 94760 N-INVAS EAR/PLS OXIMETRY 1: CPT

## 2024-01-05 PROCEDURE — 85025 COMPLETE CBC W/AUTO DIFF WBC: CPT | Performed by: STUDENT IN AN ORGANIZED HEALTH CARE EDUCATION/TRAINING PROGRAM

## 2024-01-05 PROCEDURE — 99233 SBSQ HOSP IP/OBS HIGH 50: CPT | Performed by: INTERNAL MEDICINE

## 2024-01-05 PROCEDURE — 99232 SBSQ HOSP IP/OBS MODERATE 35: CPT | Performed by: STUDENT IN AN ORGANIZED HEALTH CARE EDUCATION/TRAINING PROGRAM

## 2024-01-05 PROCEDURE — 99232 SBSQ HOSP IP/OBS MODERATE 35: CPT | Performed by: INTERNAL MEDICINE

## 2024-01-05 PROCEDURE — 80048 BASIC METABOLIC PNL TOTAL CA: CPT | Performed by: STUDENT IN AN ORGANIZED HEALTH CARE EDUCATION/TRAINING PROGRAM

## 2024-01-05 PROCEDURE — 94640 AIRWAY INHALATION TREATMENT: CPT

## 2024-01-05 PROCEDURE — 83735 ASSAY OF MAGNESIUM: CPT | Performed by: STUDENT IN AN ORGANIZED HEALTH CARE EDUCATION/TRAINING PROGRAM

## 2024-01-05 RX ORDER — METHYLPREDNISOLONE SODIUM SUCCINATE 40 MG/ML
40 INJECTION, POWDER, LYOPHILIZED, FOR SOLUTION INTRAMUSCULAR; INTRAVENOUS DAILY
Status: DISCONTINUED | OUTPATIENT
Start: 2024-01-05 | End: 2024-01-05

## 2024-01-05 RX ORDER — METHYLPREDNISOLONE SODIUM SUCCINATE 40 MG/ML
40 INJECTION, POWDER, LYOPHILIZED, FOR SOLUTION INTRAMUSCULAR; INTRAVENOUS DAILY
Status: COMPLETED | OUTPATIENT
Start: 2024-01-05 | End: 2024-01-05

## 2024-01-05 RX ORDER — HEPARIN SODIUM 5000 [USP'U]/ML
5000 INJECTION, SOLUTION INTRAVENOUS; SUBCUTANEOUS EVERY 8 HOURS SCHEDULED
Status: DISCONTINUED | OUTPATIENT
Start: 2024-01-05 | End: 2024-01-07 | Stop reason: HOSPADM

## 2024-01-05 RX ORDER — PREDNISONE 20 MG/1
40 TABLET ORAL DAILY
Status: DISCONTINUED | OUTPATIENT
Start: 2024-01-06 | End: 2024-01-07 | Stop reason: HOSPADM

## 2024-01-05 RX ADMIN — PANTOPRAZOLE SODIUM 40 MG: 40 TABLET, DELAYED RELEASE ORAL at 05:46

## 2024-01-05 RX ADMIN — IPRATROPIUM BROMIDE 0.5 MG: 0.5 SOLUTION RESPIRATORY (INHALATION) at 02:16

## 2024-01-05 RX ADMIN — METHYLPREDNISOLONE SODIUM SUCCINATE 40 MG: 40 INJECTION, POWDER, FOR SOLUTION INTRAMUSCULAR; INTRAVENOUS at 12:57

## 2024-01-05 RX ADMIN — SENNOSIDES AND DOCUSATE SODIUM 2 TABLET: 8.6; 5 TABLET ORAL at 09:26

## 2024-01-05 RX ADMIN — LEVALBUTEROL HYDROCHLORIDE 1.25 MG: 1.25 SOLUTION RESPIRATORY (INHALATION) at 07:00

## 2024-01-05 RX ADMIN — SENNOSIDES AND DOCUSATE SODIUM 2 TABLET: 8.6; 5 TABLET ORAL at 17:20

## 2024-01-05 RX ADMIN — IPRATROPIUM BROMIDE 0.5 MG: 0.5 SOLUTION RESPIRATORY (INHALATION) at 14:54

## 2024-01-05 RX ADMIN — LOSARTAN POTASSIUM 50 MG: 50 TABLET, FILM COATED ORAL at 21:33

## 2024-01-05 RX ADMIN — IPRATROPIUM BROMIDE 0.5 MG: 0.5 SOLUTION RESPIRATORY (INHALATION) at 19:32

## 2024-01-05 RX ADMIN — LEVALBUTEROL HYDROCHLORIDE 1.25 MG: 1.25 SOLUTION RESPIRATORY (INHALATION) at 19:32

## 2024-01-05 RX ADMIN — LEVALBUTEROL HYDROCHLORIDE 1.25 MG: 1.25 SOLUTION RESPIRATORY (INHALATION) at 02:16

## 2024-01-05 RX ADMIN — POLYETHYLENE GLYCOL 3350 17 G: 17 POWDER, FOR SOLUTION ORAL at 09:25

## 2024-01-05 RX ADMIN — QUETIAPINE FUMARATE 25 MG: 25 TABLET ORAL at 21:33

## 2024-01-05 RX ADMIN — IPRATROPIUM BROMIDE 0.5 MG: 0.5 SOLUTION RESPIRATORY (INHALATION) at 07:00

## 2024-01-05 RX ADMIN — HEPARIN SODIUM 5000 UNITS: 5000 INJECTION INTRAVENOUS; SUBCUTANEOUS at 21:33

## 2024-01-05 RX ADMIN — LEVALBUTEROL HYDROCHLORIDE 1.25 MG: 1.25 SOLUTION RESPIRATORY (INHALATION) at 14:54

## 2024-01-05 RX ADMIN — HEPARIN SODIUM 5000 UNITS: 5000 INJECTION INTRAVENOUS; SUBCUTANEOUS at 14:47

## 2024-01-05 NOTE — ASSESSMENT & PLAN NOTE
Past medical history of severe COPD  Continue IV solumedrol today with plans to wean to prednisone tmr  Continue Xopenex and Atrovent

## 2024-01-05 NOTE — PLAN OF CARE
Problem: SAFETY ADULT  Goal: Patient will remain free of falls  Description: INTERVENTIONS:  - Educate patient/family on patient safety including physical limitations  - Instruct patient to call for assistance with activity   - Consult OT/PT to assist with strengthening/mobility   - Keep Call bell within reach  - Keep bed low and locked with side rails adjusted as appropriate  - Keep care items and personal belongings within reach  - Initiate and maintain comfort rounds  - Make Fall Risk Sign visible to staff  - Offer Toileting every 4 Hours, in advance of need  - Initiate/Maintain bed alarm  - Apply yellow socks and bracelet for high fall risk patients  - Consider moving patient to room near nurses station  Outcome: Progressing     Problem: RESPIRATORY - ADULT  Goal: Achieves optimal ventilation and oxygenation  Description: INTERVENTIONS:  - Assess for changes in respiratory status  - Assess for changes in mentation and behavior  - Position to facilitate oxygenation and minimize respiratory effort  - Oxygen administered by appropriate delivery if ordered  - Initiate smoking cessation education as indicated  - Encourage broncho-pulmonary hygiene including cough, deep breathe, Incentive Spirometry  - Assess the need for suctioning and aspirate as needed  - Assess and instruct to report SOB or any respiratory difficulty  - Respiratory Therapy support as indicated  Outcome: Progressing

## 2024-01-05 NOTE — ASSESSMENT & PLAN NOTE
Wt Readings from Last 3 Encounters:   01/05/24 60.7 kg (133 lb 13.1 oz)   12/27/23 64.4 kg (141 lb 14.4 oz)   12/14/23 62.1 kg (137 lb)     Patient appears euvolemic, continue to monitor.  Not on diuretics prior to admission

## 2024-01-05 NOTE — ASSESSMENT & PLAN NOTE
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) WRITTEN ON 1/5/2024 12:34 PM BY GILDA MILLER MD    In the setting of severe COPD exacerbation  Initially monitored in ICU, for transfer out on 01/03  Encourage utilizing BiPAP at bedtime while here  On home o2 2-4L NC, did require up to 5L NC  Now resolved, back to baseline o2 requirement  Pulmonary consulted to see if patient may qualify for bipap, last overnight o2 study done on 12/09 which patient did not qualify at that time

## 2024-01-05 NOTE — ASSESSMENT & PLAN NOTE
In the setting of severe COPD exacerbation  Initially monitored in ICU, for transfer out on 01/03  Encourage utilizing BiPAP at bedtime while here  On home o2 2-4L NC, did require up to 5L NC  Now resolved, back to baseline o2 requirement  Pulmonary consulted to see if patient may qualify for bipap, last overnight o2 study done on 12/09 which patient did not qualify at that time

## 2024-01-05 NOTE — PROGRESS NOTES
Progress Note - Palliative & Supportive Care  Severiano Feliciano  88 y.o.  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)    Rectal bleeding    Hyperlipidemia    Acute on chronic respiratory failure with hypoxia and hypercapnia (HCC)    Weight loss    S/P hernia repair    Supplemental oxygen dependent    Chronic kidney disease, stage 3a (HCC)    Vitamin B12 deficiency    Vitamin D deficiency    Hypoxemia    Chronic obstructive pulmonary disease with acute exacerbation (HCC)    PAC (premature atrial contraction)    Prediabetes    Bilateral hearing loss    Constipation    Aspiration into airway    Macrocytosis     Active problems addressed:  Acute on chronic hypoxic and hypercapnic respiratory failure   Severe COPD with acute exacerbation  CKD stage III  HTN  CHFpEF  Goals of care counseling  Palliative care encounter    Consult is for goals    PLAN:    1. Symptom management:  Per primary/pulmo    2. Goals:   Patient appears to have capacity to make complex medical choices. But he directed me to speak to his son/Daljit on the phone.  Discussed COPD as being a progressive illness that will only worsen over time as the lungs continue to get damaged over time as he grows older. Someday, none of the medications aimed to control inflammation and exacerbations of COPD to provide him significant relief will work anymore. When  patients with COPD reach end stage, we offer hospice services at home to keep them comfortable towards the end if their lives.   I spent time discussing what hospice is - that this is purely for end of life cares to keep then comfortable from symptoms of progressive COPD as we allow nature to take its course with no further medical restorative cares.   I discussed that he appears appropriate for hospice at this time. However, we cannot start hospice services without their consents. At this time, they are not ready to make any decisions towards hospice.   I also discussed chest compression and intubation in patients with severe COPD. If he gets to the point that he is unable to breathe on his own anymore, this only reflects further deterioration or worsening of his COPD. At this point, putting a breathing tube in and letting the machine breathe for him will not change or improve his condition anymore. This will only prolong the inevitable and prolong his suffering. If we proceed with this, chances of a tracheostomy is very high and he will likely be placed at a long term facility for the remainder of his life. I recommended against CPR/intubation. At this time, the family are not ready to make a decision about this either.   I recommended and offered a referral to [facility] to continue efforts at advanced care planning and goals of care when discharged. Daljit is interested and agreeable to this. I reached out to their liaison personally today to make aware of referral.   D/w Dr. Lemons. D/c plan for tomorrow. Will sign off now    Code status: Level 1 - Full Code   Decisional apparatus:  Patient does have capacity to make medical decisions on my exam today. If such capacity is lost, patient's substitute decision maker would default to wife by [place] 169.   Advance Directive / Living Will / POLST:  none on file    We appreciate the opportunity to participate in this patient's care. We will  "continue to follow. Please do not hesitate to contact our on-call provider through our clinic answering service at [phone] should you have acute symptom control concerns.    INTERVAL HISTORY:  Chart reviewed. No acute events overnight. Today, patient appears better - more awake and interactive. No longer delirious/agitated. Sitting calmly on edge of bed. They directed me to speak to their son/Daljit as opposed to speaking with them with a  via [employer]. Conversation as noted above    Review of Systems   Constitutional:  Negative for activity change.   Respiratory:  Negative for cough and shortness of breath.    Cardiovascular:  Negative for chest pain.   Gastrointestinal:  Negative for abdominal pain.   Psychiatric/Behavioral:  Negative for sleep disturbance. The patient is not nervous/anxious.    All other systems reviewed and are negative.      MEDICATIONS / ALLERGIES:  all current active meds have been reviewed    Allergies   Allergen Reactions    Penicillins Hives, Itching and Rash    Lisinopril Rash     Side pains and rash     Zyprexa [Olanzapine] Tongue Swelling       OBJECTIVE:  /66 (BP Location: Left arm)   Pulse 82   Temp 97.5 °F (36.4 °C) (Temporal)   Resp 20   Ht 5' 3\" (1.6 m)   Wt 60.7 kg (133 lb 13.1 oz)   SpO2 98%   BMI 23.71 kg/m²   Physical Exam:    Physical Exam  Constitutional:       General: He is not in acute distress.     Appearance: He is well-developed. He is ill-appearing. He is not toxic-appearing or diaphoretic.   HENT:      Head: Normocephalic and atraumatic.   Eyes:      General: No scleral icterus.        Right eye: No discharge.         Left eye: No discharge.   Pulmonary:      Effort: Pulmonary effort is normal. No respiratory distress.      Comments: On 4L NC  Abdominal:      General: Abdomen is flat. There is no distension.   Musculoskeletal:         General: No swelling.   Skin:     Coloration: Skin is not jaundiced or pale.   Neurological:      General: No " focal deficit present.      Mental Status: He is alert and oriented to person, place, and time.   Psychiatric:         Mood and Affect: Mood normal.         Behavior: Behavior normal.       Lab Results:   I have personally reviewed pertinent labs.  Imaging Studies: I have personally reviewed pertinent reports.    EKG, Pathology, and Other Studies: I have personally reviewed pertinent reports.      Counseling / Coordination of Care  Total floor / unit time spent today 60 minutes. Greater than 50% of total time was spent with the patient and / or family counseling and / or coordination of care. A description of the counseling / coordination of care: provided medical updates, discussed palliative care, determined competency, determined goals of care, determined POA, determined social/family support, discussed plans of care, discussed symptom management, provided psychosocial support.    Jessie Lara MD  [facility]  [phone]

## 2024-01-05 NOTE — PLAN OF CARE
Problem: Potential for Falls  Goal: Patient will remain free of falls  Description: INTERVENTIONS:  - Educate patient/family on patient safety including physical limitations  - Instruct patient to call for assistance with activity   - Consult OT/PT to assist with strengthening/mobility   - Keep Call bell within reach  - Keep bed low and locked with side rails adjusted as appropriate  - Keep care items and personal belongings within reach  - Initiate and maintain comfort rounds  - Make Fall Risk Sign visible to staff  - Offer Toileting every  Hours, in advance of need  - Initiate/Maintain alarm  - Obtain necessary fall risk management equipment:   - Apply yellow socks and bracelet for high fall risk patients  - Consider moving patient to room near nurses station  Outcome: Progressing     Problem: PAIN - ADULT  Goal: Verbalizes/displays adequate comfort level or baseline comfort level  Description: Interventions:  - Encourage patient to monitor pain and request assistance  - Assess pain using appropriate pain scale  - Administer analgesics based on type and severity of pain and evaluate response  - Implement non-pharmacological measures as appropriate and evaluate response  - Consider cultural and social influences on pain and pain management  - Notify physician/advanced practitioner if interventions unsuccessful or patient reports new pain  Outcome: Progressing     Problem: INFECTION - ADULT  Goal: Absence or prevention of progression during hospitalization  Description: INTERVENTIONS:  - Assess and monitor for signs and symptoms of infection  - Monitor lab/diagnostic results  - Monitor all insertion sites, i.e. indwelling lines, tubes, and drains  - Monitor endotracheal if appropriate and nasal secretions for changes in amount and color  - [place] appropriate cooling/warming therapies per order  - Administer medications as ordered  - Instruct and encourage patient and family to use good hand hygiene technique  -  Identify and instruct in appropriate isolation precautions for identified infection/condition  Outcome: Progressing  Goal: Absence of fever/infection during neutropenic period  Description: INTERVENTIONS:  - Monitor WBC    Outcome: Progressing     Problem: SAFETY ADULT  Goal: Patient will remain free of falls  Description: INTERVENTIONS:  - Educate patient/family on patient safety including physical limitations  - Instruct patient to call for assistance with activity   - Consult OT/PT to assist with strengthening/mobility   - Keep Call bell within reach  - Keep bed low and locked with side rails adjusted as appropriate  - Keep care items and personal belongings within reach  - Initiate and maintain comfort rounds  - Make Fall Risk Sign visible to staff  - Offer Toileting every  Hours, in advance of need  - Initiate/Maintain alarm  - Obtain necessary fall risk management equipment:   - Apply yellow socks and bracelet for high fall risk patients  - Consider moving patient to room near nurses station  Outcome: Progressing  Goal: Maintain or return to baseline ADL function  Description: INTERVENTIONS:  -  Assess patient's ability to carry out ADLs; assess patient's baseline for ADL function and identify physical deficits which impact ability to perform ADLs (bathing, care of mouth/teeth, toileting, grooming, dressing, etc.)  - Assess/evaluate cause of self-care deficits   - Assess range of motion  - Assess patient's mobility; develop plan if impaired  - Assess patient's need for assistive devices and provide as appropriate  - Encourage maximum independence but intervene and supervise when necessary  - Involve family in performance of ADLs  - Assess for home care needs following discharge   - Consider OT consult to assist with ADL evaluation and planning for discharge  - Provide patient education as appropriate  Outcome: Progressing  Goal: Maintains/Returns to pre admission functional level  Description:  INTERVENTIONS:  - Perform AM-PAC 6 Click Basic Mobility/ Daily Activity assessment daily.  - Set and communicate daily mobility goal to care team and patient/family/caregiver.   - Collaborate with rehabilitation services on mobility goals if consulted  - Perform Range of Motion  times a day.  - Reposition patient every  hours.  - Dangle patient  times a day  - Stand patient  times a day  - Ambulate patient  times a day  - Out of bed to chair  times a day   - Out of bed for meals  times a day  - Out of bed for toileting  - Record patient progress and toleration of activity level   Outcome: Progressing     Problem: DISCHARGE PLANNING  Goal: Discharge to home or other facility with appropriate resources  Description: INTERVENTIONS:  - Identify barriers to discharge w/patient and caregiver  - Arrange for needed discharge resources and transportation as appropriate  - Identify discharge learning needs (meds, wound care, etc.)  - Arrange for interpretive services to assist at discharge as needed  - Refer to Case Management Department for coordinating discharge planning if the patient needs post-hospital services based on physician/advanced practitioner order or complex needs related to functional status, cognitive ability, or social support system  Outcome: Progressing     Problem: Knowledge Deficit  Goal: Patient/family/caregiver demonstrates understanding of disease process, treatment plan, medications, and discharge instructions  Description: Complete learning assessment and assess knowledge base.  Interventions:  - Provide teaching at level of understanding  - Provide teaching via preferred learning methods  Outcome: Progressing     Problem: RESPIRATORY - ADULT  Goal: Achieves optimal ventilation and oxygenation  Description: INTERVENTIONS:  - Assess for changes in respiratory status  - Assess for changes in mentation and behavior  - Position to facilitate oxygenation and minimize respiratory effort  - Oxygen  administered by appropriate delivery if ordered  - Initiate smoking cessation education as indicated  - Encourage broncho-pulmonary hygiene including cough, deep breathe, Incentive Spirometry  - Assess the need for suctioning and aspirate as needed  - Assess and instruct to report SOB or any respiratory difficulty  - Respiratory Therapy support as indicated  Outcome: Progressing     Problem: Prexisting or High Potential for Compromised Skin Integrity  Goal: Skin integrity is maintained or improved  Description: INTERVENTIONS:  - Identify patients at risk for skin breakdown  - Assess and monitor skin integrity  - Assess and monitor nutrition and hydration status  - Monitor labs   - Assess for incontinence   - Turn and reposition patient  - Assist with mobility/ambulation  - Relieve pressure over bony prominences  - Avoid friction and shearing  - Provide appropriate hygiene as needed including keeping skin clean and dry  - Evaluate need for skin moisturizer/barrier cream  - Collaborate with interdisciplinary team   - Patient/family teaching  - Consider wound care consult   Outcome: Progressing     Problem: METABOLIC, FLUID AND ELECTROLYTES - ADULT  Goal: Electrolytes maintained within normal limits  Description: INTERVENTIONS:  - Monitor labs and assess patient for signs and symptoms of electrolyte imbalances  - Administer electrolyte replacement as ordered  - Monitor response to electrolyte replacements, including repeat lab results as appropriate  - Instruct patient on fluid and nutrition as appropriate  Outcome: Progressing  Goal: Fluid balance maintained  Description: INTERVENTIONS:  - Monitor labs   - Monitor I/O and WT  - Instruct patient on fluid and nutrition as appropriate  - Assess for signs & symptoms of volume excess or deficit  Outcome: Progressing     Problem: HEMATOLOGIC - ADULT  Goal: Maintains hematologic stability  Description: INTERVENTIONS  - Assess for signs and symptoms of bleeding or  hemorrhage  - Monitor labs  - Administer supportive blood products/factors as ordered and appropriate  Outcome: Progressing     Problem: Nutrition/Hydration-ADULT  Goal: Nutrient/Hydration intake appropriate for improving, restoring or maintaining nutritional needs  Description: Monitor and assess patient's nutrition/hydration status for malnutrition. Collaborate with interdisciplinary team and initiate plan and interventions as ordered.  Monitor patient's weight and dietary intake as ordered or per policy. Utilize nutrition screening tool and intervene as necessary. Determine patient's food preferences and provide high-protein, high-caloric foods as appropriate.     INTERVENTIONS:  - Monitor oral intake, urinary output, labs, and treatment plans  - Assess nutrition and hydration status and recommend course of action  - Evaluate amount of meals eaten  - Assist patient with eating if necessary   - Allow adequate time for meals  - Recommend/ encourage appropriate diets, oral nutritional supplements, and vitamin/mineral supplements  - Order, calculate, and assess calorie counts as needed  - Recommend, monitor, and adjust tube feedings and TPN/PPN based on assessed needs  - Assess need for intravenous fluids  - Provide specific nutrition/hydration education as appropriate  - Include patient/family/caregiver in decisions related to nutrition  Outcome: Progressing

## 2024-01-05 NOTE — PROGRESS NOTES
Progress Note - Pulmonary   Severiano Feliciano 88 y.o. male MRN: [id number]  Unit/Bed#: E4 -01 Encounter: [id number]    Assessment/Plan:    Acute on chronic respiratory failure with hypoxia and hypercapnia  -98% on 4 L NC  -Continue to maintain saturation greater than 89%  -Pulmonary hygiene encouraged: deep breathing with cough, OOB as tolerated, incentive spirometry and flutter valve hourly as patient tolerates  -BiPAP at night, Pt does not like the bipap  -Previous overnight study was not completed correctly and patient did not qualify for BiPAP  -Recommend repeating the study on 2 L O2 all night long, with ABGs in the morning    COPD, severe with acute exacerbation  -20-pack-year history of smoking, quit in 2000  -Home regime budesonide 0.5 Mg/2 mL nebulizer solution 2 times daily, formoterol 20 mcg / 2 mL nebulizer solution 2 times daily, albuterol 90 mcg/ACT inhaler 2 puffs every 6 hours as needed, albuterol 2.5 mg / 3 mL 0.083% nebulized solution every 6 hours as needed for wheezing  -Continue nebulizers, Atrovent and Xopenex  -Azithromycin given for COPD exacerbation  -Give Solu-Medrol 40 mg one-time today, will start prednisone 40 mg daily tomorrow  -Palliative care consulted due to end-stage disease process and repeated hospitalizations    HFpEF  -Echo completed 4/11/2023 shows EF 66% diastolic function is mildly abnormal consistent with grade 1 relaxation mild regurgitation and mitral and tricuspid valve estimated RV SP 39.00 mmHg  -Does not appear to have volume overload  -Continue to monitor daily weight    Chief Complaint:    Can I go home    Subjective:    Patient seen and examined at bedside.  Was sitting up comfortably eating lunch with his wife.  Is in no respiratory distress at this time.  Denies any overnight events.  Repeated hypercapnia noted overnight pulse oximetry recommended for patient    Objective:    Vitals: Blood pressure 107/66, pulse 82, temperature 97.5 °F (36.4 °C),  "temperature source Temporal, resp. rate 20, height 5' 3\" (1.6 m), weight 60.7 kg (133 lb 13.1 oz), SpO2 98%.4 L,Body mass index is 23.71 kg/m².      Intake/Output Summary (Last 24 hours) at 1/5/2024 0942  Last data filed at 1/4/2024 2201  Gross per 24 hour   Intake 240 ml   Output --   Net 240 ml       Invasive Devices       Peripheral Intravenous Line  Duration             Peripheral IV 01/04/24 Left;Proximal;Ventral (anterior) Forearm <1 day                    Physical Exam:     Physical Exam  Vitals reviewed.   Constitutional:       General: He is not in acute distress.     Appearance: He is normal weight. He is not ill-appearing.   HENT:      Head: Normocephalic and atraumatic.      Right Ear: External ear normal.      Left Ear: External ear normal.      Nose: Nose normal.      Mouth/Throat:      Mouth: Mucous membranes are moist.      Pharynx: Oropharynx is clear.   Eyes:      Extraocular Movements: Extraocular movements intact.      Pupils: Pupils are equal, round, and reactive to light.   Cardiovascular:      Rate and Rhythm: Normal rate and regular rhythm.      Pulses: Normal pulses.      Heart sounds: Normal heart sounds.   Pulmonary:      Effort: Pulmonary effort is normal. No respiratory distress.      Breath sounds: No stridor. No wheezing, rhonchi or rales.      Comments: Diminished breath sounds bilaterally  Chest:      Chest wall: No tenderness.   Abdominal:      General: Bowel sounds are normal.      Palpations: Abdomen is soft.      Tenderness: There is no abdominal tenderness. There is no guarding or rebound.   Musculoskeletal:         General: Normal range of motion.      Cervical back: Normal range of motion and neck supple.      Right lower leg: No edema.      Left lower leg: No edema.   Skin:     General: Skin is warm and dry.      Findings: No bruising or erythema.   Neurological:      Mental Status: He is alert and oriented to person, place, and time.   Psychiatric:         Mood and Affect: " "Mood normal.         Behavior: Behavior normal.         Thought Content: Thought content normal.         Judgment: Judgment normal.         Labs: I have personally reviewed pertinent lab results., ABG: No results found for: \"PHART\", \"ETF9DMW\", \"PO2ART\", \"ULP9EPW\", \"O7PTOPFH\", \"BEART\", \"SOURCE\", BNP: No results found for: \"BNP\", CBC:   Lab Results   Component Value Date    WBC 9.64 01/05/2024    HGB 12.4 01/05/2024    HCT 41.0 01/05/2024     (H) 01/05/2024     (L) 01/05/2024    RBC 3.97 01/05/2024    MCH 31.2 01/05/2024    MCHC 30.2 (L) 01/05/2024    RDW 13.3 01/05/2024    MPV 9.1 01/05/2024    NRBC 0 01/05/2024   , CMP:   Lab Results   Component Value Date    SODIUM 143 01/05/2024    K 4.2 01/05/2024    CL 97 01/05/2024    CO2 43 (H) 01/05/2024    BUN 31 (H) 01/05/2024    CREATININE 0.87 01/05/2024    CALCIUM 9.5 01/05/2024    EGFR 78 01/05/2024         Imaging and other studies: I have personally reviewed pertinent reports.  Chest x-ray 1/1/2024 shows bibasilar atelectasis with mild elevation of left hemidiaphragm  "

## 2024-01-05 NOTE — PROGRESS NOTES
Critical access hospital  Progress Note  Name: Severiano Feliciano I  MRN: [id number]  Unit/Bed#: E4 -01 I Date of Admission: 1/1/2024   Date of Service: 1/5/2024 I Hospital Day: 3    Assessment/Plan   Chronic obstructive pulmonary disease with acute exacerbation (HCC)  Assessment & Plan  Past medical history of severe COPD  Continue IV solumedrol today with plans to wean to prednisone tmr  Continue Xopenex and Atrovent    Chronic kidney disease, stage 3a (HCC)  Assessment & Plan  Lab Results   Component Value Date    EGFR 78 01/05/2024    EGFR 84 01/04/2024    EGFR 84 01/03/2024    CREATININE 0.87 01/05/2024    CREATININE 0.72 01/04/2024    CREATININE 0.72 01/03/2024     Renal function stable at baseline    Rectal bleeding  Assessment & Plan  Rectal bleeding with BM yesterday, H&H stable  Appreciate GI input, colonoscopy likely outpatient  H/H stable, suspect due to constipation and hemorrhoidal bleeding    Metabolic encephalopathy  Assessment & Plan  In the setting of hypercapnic respiratory failure, treated with BiPAP  Now improved    Chronic diastolic CHF (congestive heart failure) (HCC)  Assessment & Plan  Wt Readings from Last 3 Encounters:   01/05/24 60.7 kg (133 lb 13.1 oz)   12/27/23 64.4 kg (141 lb 14.4 oz)   12/14/23 62.1 kg (137 lb)     Patient appears euvolemic, continue to monitor.  Not on diuretics prior to admission          Chronic respiratory failure with hypoxia (HCC)  Assessment & Plan  Chronically on 2 to 4 L nasal cannula    Benign essential hypertension  Assessment & Plan  Currently controlled, continue losartan 50 mg twice daily    History of DVT (deep vein thrombosis)  Assessment & Plan  DVT prophylaxis    * Acute on chronic respiratory failure with hypoxia and hypercapnia (HCC)  Assessment & Plan  In the setting of severe COPD exacerbation  Initially monitored in ICU, for transfer out on 01/03  Encourage utilizing BiPAP at bedtime while here  On home o2 2-4L NC, did  require up to 5L NC  Now resolved, back to baseline o2 requirement  Pulmonary consulted to see if patient may qualify for bipap, last overnight o2 study done on  which patient did not qualify at that time             VTE Pharmacologic Prophylaxis:   Pharmacologic:  heparin  Mechanical VTE Prophylaxis in Place: Yes    Discussions with Specialists or Other Care Team Provider: GI, pulmonary    Education and Discussions with Family / Patient: wife bedside    Current Length of Stay: 3 day(s)    Current Patient Status: Inpatient   Certification Statement: The patient will continue to require additional inpatient hospital stay due to pulmonary evaluation, overnight bipap evaluation    Discharge Plan: pending, 24 hours    Code Status: Level 1 - Full Code      Subjective:   No events overnight. Was able to sleep better, less confused. Denies any SOB, CP or wheezing. Wife at bedside, questions answered.    Objective:     Vitals:   Temp (24hrs), Av.5 °F (36.4 °C), Min:97.3 °F (36.3 °C), Max:97.8 °F (36.6 °C)    Temp:  [97.3 °F (36.3 °C)-97.8 °F (36.6 °C)] 97.5 °F (36.4 °C)  HR:  [] 82  Resp:  [20-22] 20  BP: (107-125)/(58-66) 107/66  SpO2:  [95 %-99 %] 98 %  Body mass index is 23.71 kg/m².     Input and Output Summary (last 24 hours):       Intake/Output Summary (Last 24 hours) at 2024 1235  Last data filed at 2024 2201  Gross per 24 hour   Intake 240 ml   Output --   Net 240 ml       Physical Exam:     Physical Exam  Vitals and nursing note reviewed.   Constitutional:       Appearance: He is ill-appearing.   HENT:      Head: Normocephalic.   Eyes:      Conjunctiva/sclera: Conjunctivae normal.   Cardiovascular:      Rate and Rhythm: Normal rate.   Pulmonary:      Breath sounds: No wheezing.      Comments: Decreased breath sounds bilaterally  Abdominal:      General: Bowel sounds are normal. There is no distension.      Palpations: Abdomen is soft.   Musculoskeletal:         General: No swelling.       Right lower leg: No edema.      Left lower leg: No edema.   Skin:     General: Skin is warm and dry.   Neurological:      General: No focal deficit present.      Mental Status: He is alert. Mental status is at baseline.         Additional Data:     Labs:    Results from last 7 days   Lab Units 01/05/24  0458   WBC Thousand/uL 9.64   HEMOGLOBIN g/dL 12.4   HEMATOCRIT % 41.0   PLATELETS Thousands/uL 132*   NEUTROS PCT % 87*   LYMPHS PCT % 6*   MONOS PCT % 6   EOS PCT % 0     Results from last 7 days   Lab Units 01/05/24  0458 01/03/24  0457 01/02/24  0635   SODIUM mmol/L 143   < > 147   POTASSIUM mmol/L 4.2   < > 4.5   CHLORIDE mmol/L 97   < > 98   CO2 mmol/L 43*   < > >45*   BUN mg/dL 31*   < > 25   CREATININE mg/dL 0.87   < > 0.77   ANION GAP mmol/L 3  --   --    CALCIUM mg/dL 9.5   < > 10.0   ALBUMIN g/dL  --   --  3.5   TOTAL BILIRUBIN mg/dL  --   --  0.34   ALK PHOS U/L  --   --  66   ALT U/L  --   --  24   AST U/L  --   --  14   GLUCOSE RANDOM mg/dL 163*   < > 128    < > = values in this interval not displayed.                 Results from last 7 days   Lab Units 01/02/24  0635 01/01/24  2050   LACTIC ACID mmol/L  --  1.2   PROCALCITONIN ng/ml 0.05  --            * I Have Reviewed All Lab Data Listed Above.  * Additional Pertinent Lab Tests Reviewed: All Labs For Current Hospital Admission Reviewed    Mobility:  Basic Mobility Inpatient Raw Score: 16  [facility] Goal: 5: Stand one or more mins  -[facility] Achieved: 6: Walk 10 steps or more    Lines:     Invasive Devices       Peripheral Intravenous Line  Duration             Peripheral IV 01/04/24 Left;Proximal;Ventral (anterior) Forearm <1 day                       Imaging:    Imaging Reports Reviewed Today Include:     XR chest 1 view portable    Result Date: 1/2/2024  Impression: Bibasilar atelectasis with mild elevation of the left hemidiaphragm. Workstation performed: [id number]        Recent Cultures (last 7 days):     Results from last 7 days   Lab Units  01/01/24 2125 01/01/24 2050   BLOOD CULTURE  No Growth at 72 hrs. No Growth at 72 hrs.       Last 24 Hours Medication List:   Current Facility-Administered Medications   Medication Dose Route Frequency Provider Last Rate    hydrALAZINE  10 mg Intravenous Q6H PRN Ze Lemons MD      hydrOXYzine HCL  25 mg Oral Q6H PRN Ze Lemons MD      ipratropium  0.5 mg Nebulization Q6H Ze Lemons MD      levalbuterol  1.25 mg Nebulization Q6H Ze Lemons MD      losartan  50 mg Oral BID Ze Lemons MD      methylPREDNISolone sodium succinate  40 mg Intravenous Daily RAQUEL Mont[place]      pantoprazole  40 mg Oral Early Morning Ze Lemons MD      polyethylene glycol  17 g Oral Daily Ze Lemons MD      [START ON 1/6/2024] predniSONE  40 mg Oral Daily Ze Lemons MD      QUEtiapine  25 mg Oral HS Ze Lemons MD      senna-docusate sodium  2 tablet Oral BID Ze Lemons MD          Today, Patient Was Seen By: Ze Lemons MD    ** Please Note: Dictation voice to text software may have been used in the creation of this document. **

## 2024-01-05 NOTE — ASSESSMENT & PLAN NOTE
Rectal bleeding with BM yesterday, H&H stable  Appreciate GI input, colonoscopy likely outpatient  H/H stable, suspect due to constipation and hemorrhoidal bleeding

## 2024-01-06 LAB
ALBUMIN SERPL BCP-MCNC: 3.2 G/DL (ref 3.5–5)
ALP SERPL-CCNC: 50 U/L (ref 34–104)
ALT SERPL W P-5'-P-CCNC: 12 U/L (ref 7–52)
ANION GAP SERPL CALCULATED.3IONS-SCNC: 1 MMOL/L
ARTERIAL PATENCY WRIST A: YES
AST SERPL W P-5'-P-CCNC: 11 U/L (ref 13–39)
BASE EXCESS BLDA CALC-SCNC: 15.6 MMOL/L
BASOPHILS # BLD AUTO: 0.01 THOUSANDS/ÂΜL (ref 0–0.1)
BASOPHILS NFR BLD AUTO: 0 % (ref 0–1)
BILIRUB SERPL-MCNC: 0.38 MG/DL (ref 0.2–1)
BUN SERPL-MCNC: 30 MG/DL (ref 5–25)
CALCIUM ALBUM COR SERPL-MCNC: 9.8 MG/DL (ref 8.3–10.1)
CALCIUM SERPL-MCNC: 9.2 MG/DL (ref 8.4–10.2)
CHLORIDE SERPL-SCNC: 99 MMOL/L (ref 96–108)
CO2 SERPL-SCNC: 43 MMOL/L (ref 21–32)
CREAT SERPL-MCNC: 0.82 MG/DL (ref 0.6–1.3)
EOSINOPHIL # BLD AUTO: 0.08 THOUSAND/ÂΜL (ref 0–0.61)
EOSINOPHIL NFR BLD AUTO: 1 % (ref 0–6)
ERYTHROCYTE [DISTWIDTH] IN BLOOD BY AUTOMATED COUNT: 13.3 % (ref 11.6–15.1)
GFR SERPL CREATININE-BSD FRML MDRD: 80 ML/MIN/1.73SQ M
GLUCOSE SERPL-MCNC: 101 MG/DL (ref 65–140)
HCO3 BLDA-SCNC: 43.2 MMOL/L (ref 22–28)
HCT VFR BLD AUTO: 37.8 % (ref 36.5–49.3)
HGB BLD-MCNC: 11.5 G/DL (ref 12–17)
IMM GRANULOCYTES # BLD AUTO: 0.01 THOUSAND/UL (ref 0–0.2)
IMM GRANULOCYTES NFR BLD AUTO: 0 % (ref 0–2)
LYMPHOCYTES # BLD AUTO: 1.35 THOUSANDS/ÂΜL (ref 0.6–4.47)
LYMPHOCYTES NFR BLD AUTO: 21 % (ref 14–44)
MCH RBC QN AUTO: 31.7 PG (ref 26.8–34.3)
MCHC RBC AUTO-ENTMCNC: 30.4 G/DL (ref 31.4–37.4)
MCV RBC AUTO: 104 FL (ref 82–98)
MONOCYTES # BLD AUTO: 0.59 THOUSAND/ÂΜL (ref 0.17–1.22)
MONOCYTES NFR BLD AUTO: 9 % (ref 4–12)
NASAL CANNULA: 2
NEUTROPHILS # BLD AUTO: 4.56 THOUSANDS/ÂΜL (ref 1.85–7.62)
NEUTS SEG NFR BLD AUTO: 69 % (ref 43–75)
NRBC BLD AUTO-RTO: 0 /100 WBCS
O2 CT BLDA-SCNC: 16.7 ML/DL (ref 16–23)
OXYHGB MFR BLDA: 96.2 % (ref 94–97)
PCO2 BLDA: 68.1 MM HG (ref 36–44)
PH BLDA: 7.42 [PH] (ref 7.35–7.45)
PLATELET # BLD AUTO: 133 THOUSANDS/UL (ref 149–390)
PMV BLD AUTO: 9.5 FL (ref 8.9–12.7)
PO2 BLDA: 93.2 MM HG (ref 75–129)
POTASSIUM SERPL-SCNC: 3.8 MMOL/L (ref 3.5–5.3)
PROT SERPL-MCNC: 5.7 G/DL (ref 6.4–8.4)
RBC # BLD AUTO: 3.63 MILLION/UL (ref 3.88–5.62)
SODIUM SERPL-SCNC: 143 MMOL/L (ref 135–147)
SPECIMEN SOURCE: ABNORMAL
WBC # BLD AUTO: 6.6 THOUSAND/UL (ref 4.31–10.16)

## 2024-01-06 PROCEDURE — 94640 AIRWAY INHALATION TREATMENT: CPT

## 2024-01-06 PROCEDURE — 36600 WITHDRAWAL OF ARTERIAL BLOOD: CPT

## 2024-01-06 PROCEDURE — 94760 N-INVAS EAR/PLS OXIMETRY 1: CPT

## 2024-01-06 PROCEDURE — 80053 COMPREHEN METABOLIC PANEL: CPT | Performed by: STUDENT IN AN ORGANIZED HEALTH CARE EDUCATION/TRAINING PROGRAM

## 2024-01-06 PROCEDURE — 99232 SBSQ HOSP IP/OBS MODERATE 35: CPT | Performed by: INTERNAL MEDICINE

## 2024-01-06 PROCEDURE — 82805 BLOOD GASES W/O2 SATURATION: CPT | Performed by: STUDENT IN AN ORGANIZED HEALTH CARE EDUCATION/TRAINING PROGRAM

## 2024-01-06 PROCEDURE — 85025 COMPLETE CBC W/AUTO DIFF WBC: CPT | Performed by: STUDENT IN AN ORGANIZED HEALTH CARE EDUCATION/TRAINING PROGRAM

## 2024-01-06 PROCEDURE — 94762 N-INVAS EAR/PLS OXIMTRY CONT: CPT

## 2024-01-06 RX ORDER — LEVALBUTEROL INHALATION SOLUTION 1.25 MG/3ML
1.25 SOLUTION RESPIRATORY (INHALATION)
Status: DISCONTINUED | OUTPATIENT
Start: 2024-01-07 | End: 2024-01-07 | Stop reason: HOSPADM

## 2024-01-06 RX ORDER — QUETIAPINE FUMARATE 25 MG/1
25 TABLET, FILM COATED ORAL
Qty: 30 TABLET | Refills: 0 | Status: SHIPPED | OUTPATIENT
Start: 2024-01-06

## 2024-01-06 RX ORDER — PREDNISONE 10 MG/1
TABLET ORAL
Qty: 40 TABLET | Refills: 0 | Status: SHIPPED | OUTPATIENT
Start: 2024-01-06

## 2024-01-06 RX ADMIN — LOSARTAN POTASSIUM 50 MG: 50 TABLET, FILM COATED ORAL at 21:55

## 2024-01-06 RX ADMIN — IPRATROPIUM BROMIDE 0.5 MG: 0.5 SOLUTION RESPIRATORY (INHALATION) at 14:06

## 2024-01-06 RX ADMIN — HEPARIN SODIUM 5000 UNITS: 5000 INJECTION INTRAVENOUS; SUBCUTANEOUS at 21:55

## 2024-01-06 RX ADMIN — IPRATROPIUM BROMIDE 0.5 MG: 0.5 SOLUTION RESPIRATORY (INHALATION) at 19:15

## 2024-01-06 RX ADMIN — HEPARIN SODIUM 5000 UNITS: 5000 INJECTION INTRAVENOUS; SUBCUTANEOUS at 06:29

## 2024-01-06 RX ADMIN — SENNOSIDES AND DOCUSATE SODIUM 2 TABLET: 8.6; 5 TABLET ORAL at 17:34

## 2024-01-06 RX ADMIN — HEPARIN SODIUM 5000 UNITS: 5000 INJECTION INTRAVENOUS; SUBCUTANEOUS at 13:24

## 2024-01-06 RX ADMIN — LEVALBUTEROL HYDROCHLORIDE 1.25 MG: 1.25 SOLUTION RESPIRATORY (INHALATION) at 07:28

## 2024-01-06 RX ADMIN — LEVALBUTEROL HYDROCHLORIDE 1.25 MG: 1.25 SOLUTION RESPIRATORY (INHALATION) at 14:06

## 2024-01-06 RX ADMIN — SENNOSIDES AND DOCUSATE SODIUM 2 TABLET: 8.6; 5 TABLET ORAL at 08:09

## 2024-01-06 RX ADMIN — PREDNISONE 40 MG: 20 TABLET ORAL at 08:09

## 2024-01-06 RX ADMIN — IPRATROPIUM BROMIDE 0.5 MG: 0.5 SOLUTION RESPIRATORY (INHALATION) at 07:28

## 2024-01-06 RX ADMIN — LOSARTAN POTASSIUM 50 MG: 50 TABLET, FILM COATED ORAL at 08:10

## 2024-01-06 RX ADMIN — POLYETHYLENE GLYCOL 3350 17 G: 17 POWDER, FOR SOLUTION ORAL at 08:09

## 2024-01-06 RX ADMIN — PANTOPRAZOLE SODIUM 40 MG: 40 TABLET, DELAYED RELEASE ORAL at 06:29

## 2024-01-06 RX ADMIN — QUETIAPINE FUMARATE 25 MG: 25 TABLET ORAL at 21:55

## 2024-01-06 RX ADMIN — LEVALBUTEROL HYDROCHLORIDE 1.25 MG: 1.25 SOLUTION RESPIRATORY (INHALATION) at 19:15

## 2024-01-06 NOTE — ASSESSMENT & PLAN NOTE
In the setting of severe COPD exacerbation  Initially monitored in ICU, for transfer out on 01/03  Encourage utilizing BiPAP at bedtime while here  On home o2 2-4L NC, did require up to 5L NC  Now resolved, back to baseline o2 requirement  Pulmonary consulted to see if patient may qualify for bipap.  The overnight oximetry was unsuccessful for technical reasons.  It will be repeated tonight.

## 2024-01-06 NOTE — PROGRESS NOTES
[facility]  Progress Note  Name: Severiano Feliciano I  MRN: [id number]  Unit/Bed#: E4 -01 I Date of Admission: 1/1/2024   Date of Service: 1/6/2024 I Hospital Day: 4    Assessment/Plan   Chronic obstructive pulmonary disease with acute exacerbation (HCC)  Assessment & Plan  Past medical history of severe COPD  Continue IV solumedrol today with plans to wean to prednisone tmr  Continue Xopenex and Atrovent    Metabolic encephalopathy  Assessment & Plan  In the setting of hypercapnic respiratory failure, treated with BiPAP  Now improved    Chronic diastolic CHF (congestive heart failure) (HCC)  Assessment & Plan  Wt Readings from Last 3 Encounters:   01/06/24 58.6 kg (129 lb 3 oz)   12/27/23 64.4 kg (141 lb 14.4 oz)   12/14/23 62.1 kg (137 lb)     Patient appears euvolemic, continue to monitor.  Not on diuretics prior to admission          Chronic respiratory failure with hypoxia (HCC)  Assessment & Plan  Chronically on 2 to 4 L nasal cannula    Benign essential hypertension  Assessment & Plan  Currently controlled, continue losartan 50 mg twice daily    History of DVT (deep vein thrombosis)  Assessment & Plan  DVT prophylaxis    * Acute on chronic respiratory failure with hypoxia and hypercapnia (HCC)  Assessment & Plan  In the setting of severe COPD exacerbation  Initially monitored in ICU, for transfer out on 01/03  Encourage utilizing BiPAP at bedtime while here  On home o2 2-4L NC, did require up to 5L NC  Now resolved, back to baseline o2 requirement  Pulmonary consulted to see if patient may qualify for bipap.  The overnight oximetry was unsuccessful for technical reasons.  It will be repeated tonight.             Subjective:  The patient is feeling better.  He feels less short of breath.  He has no chest pain.  He denies any abdominal pain, nausea, or vomiting.  He is eating pretty well.    Physical Exam:   Temp:  [97.6 °F (36.4 °C)-98 °F (36.7 °C)] 98 °F (36.7 °C)  HR:   [84-97] 97  Resp:  [18] 18  BP: (119-136)/(65-75) 131/70    Gen: Well-developed, frail, in no distress.  Neck: Supple.  No lymphadenopathy, goiter, or bruit.  Heart: Regular rhythm.  I heard no murmur, gallop, or rub.  Lungs: Diminished breath sounds bilaterally.  I heard no wheezing, rales, or rhonchi.  Abd: Soft with active bowel sounds.  No mass, tenderness, organomegaly.  Extremities: No clubbing, cyanosis, or edema.  No calf tenderness.  Neuro: Alert and conversant.  No focal sign.  Skin: Warm and dry.      LABS:   CBC:   Lab Results   Component Value Date    WBC 6.60 01/06/2024    HGB 11.5 (L) 01/06/2024    HCT 37.8 01/06/2024     (H) 01/06/2024     (L) 01/06/2024    RBC 3.63 (L) 01/06/2024    MCH 31.7 01/06/2024    MCHC 30.4 (L) 01/06/2024    RDW 13.3 01/06/2024    MPV 9.5 01/06/2024    NRBC 0 01/06/2024   , CMP:   Lab Results   Component Value Date    SODIUM 143 01/06/2024    K 3.8 01/06/2024    CL 99 01/06/2024    CO2 43 (H) 01/06/2024    BUN 30 (H) 01/06/2024    CREATININE 0.82 01/06/2024    CALCIUM 9.2 01/06/2024    AST 11 (L) 01/06/2024    ALT 12 01/06/2024    ALKPHOS 50 01/06/2024    EGFR 80 01/06/2024   , ABG:   Lab Results   Component Value Date    PHART 7.420 01/06/2024    AUG2SJP 68.1 (HH) 01/06/2024    PO2ART 93.2 01/06/2024    DDY4UCF 43.2 (H) 01/06/2024    BEART 15.6 01/06/2024    SOURCE Radial, Left 01/06/2024             VTE Pharmacologic Prophylaxis: Heparin  VTE Mechanical Prophylaxis: sequential compression device

## 2024-01-06 NOTE — ASSESSMENT & PLAN NOTE
Wt Readings from Last 3 Encounters:   01/06/24 58.6 kg (129 lb 3 oz)   12/27/23 64.4 kg (141 lb 14.4 oz)   12/14/23 62.1 kg (137 lb)     Patient appears euvolemic, continue to monitor.  Not on diuretics prior to admission

## 2024-01-06 NOTE — ASSESSMENT & PLAN NOTE
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) WRITTEN ON 1/6/2024  5:29 PM BY FRANNY SALAMANCA MD    In the setting of severe COPD exacerbation  Initially monitored in ICU, for transfer out on 01/03  Encourage utilizing BiPAP at bedtime while here  On home o2 2-4L NC, did require up to 5L NC  Now resolved, back to baseline o2 requirement  Pulmonary consulted to see if patient may qualify for bipap.  The overnight oximetry was unsuccessful for technical reasons.  It will be repeated tonight.

## 2024-01-06 NOTE — PLAN OF CARE
Problem: Potential for Falls  Goal: Patient will remain free of falls  Description: INTERVENTIONS:  - Educate patient/family on patient safety including physical limitations  - Instruct patient to call for assistance with activity   - Consult OT/PT to assist with strengthening/mobility   - Keep Call bell within reach  - Keep bed low and locked with side rails adjusted as appropriate  - Keep care items and personal belongings within reach  - Initiate and maintain comfort rounds  - Make Fall Risk Sign visible to staff  - Offer Toileting every  Hours, in advance of need  - Initiate/Maintain alarm  - Obtain necessary fall risk management equipment:   - Apply yellow socks and bracelet for high fall risk patients  - Consider moving patient to room near nurses station  Outcome: Adequate for Discharge     Problem: PAIN - ADULT  Goal: Verbalizes/displays adequate comfort level or baseline comfort level  Description: Interventions:  - Encourage patient to monitor pain and request assistance  - Assess pain using appropriate pain scale  - Administer analgesics based on type and severity of pain and evaluate response  - Implement non-pharmacological measures as appropriate and evaluate response  - Consider cultural and social influences on pain and pain management  - Notify physician/advanced practitioner if interventions unsuccessful or patient reports new pain  Outcome: Adequate for Discharge     Problem: INFECTION - ADULT  Goal: Absence or prevention of progression during hospitalization  Description: INTERVENTIONS:  - Assess and monitor for signs and symptoms of infection  - Monitor lab/diagnostic results  - Monitor all insertion sites, i.e. indwelling lines, tubes, and drains  - Monitor endotracheal if appropriate and nasal secretions for changes in amount and color  - [place] appropriate cooling/warming therapies per order  - Administer medications as ordered  - Instruct and encourage patient and family to use good  hand hygiene technique  - Identify and instruct in appropriate isolation precautions for identified infection/condition  Outcome: Adequate for Discharge  Goal: Absence of fever/infection during neutropenic period  Description: INTERVENTIONS:  - Monitor WBC    Outcome: Adequate for Discharge     Problem: SAFETY ADULT  Goal: Patient will remain free of falls  Description: INTERVENTIONS:  - Educate patient/family on patient safety including physical limitations  - Instruct patient to call for assistance with activity   - Consult OT/PT to assist with strengthening/mobility   - Keep Call bell within reach  - Keep bed low and locked with side rails adjusted as appropriate  - Keep care items and personal belongings within reach  - Initiate and maintain comfort rounds  - Make Fall Risk Sign visible to staff  - Offer Toileting every  Hours, in advance of need  - Initiate/Maintain alarm  - Obtain necessary fall risk management equipment:   - Apply yellow socks and bracelet for high fall risk patients  - Consider moving patient to room near nurses station  Outcome: Adequate for Discharge  Goal: Maintain or return to baseline ADL function  Description: INTERVENTIONS:  -  Assess patient's ability to carry out ADLs; assess patient's baseline for ADL function and identify physical deficits which impact ability to perform ADLs (bathing, care of mouth/teeth, toileting, grooming, dressing, etc.)  - Assess/evaluate cause of self-care deficits   - Assess range of motion  - Assess patient's mobility; develop plan if impaired  - Assess patient's need for assistive devices and provide as appropriate  - Encourage maximum independence but intervene and supervise when necessary  - Involve family in performance of ADLs  - Assess for home care needs following discharge   - Consider OT consult to assist with ADL evaluation and planning for discharge  - Provide patient education as appropriate  Outcome: Adequate for Discharge  Goal:  Maintains/Returns to pre admission functional level  Description: INTERVENTIONS:  - Perform AM-PAC 6 Click Basic Mobility/ Daily Activity assessment daily.  - Set and communicate daily mobility goal to care team and patient/family/caregiver.   - Collaborate with rehabilitation services on mobility goals if consulted  - Perform Range of Motion  times a day.  - Reposition patient every  hours.  - Dangle patient  times a day  - Stand patient  times a day  - Ambulate patient  times a day  - Out of bed to chair  times a day   - Out of bed for meals  times a day  - Out of bed for toileting  - Record patient progress and toleration of activity level   Outcome: Adequate for Discharge     Problem: DISCHARGE PLANNING  Goal: Discharge to home or other facility with appropriate resources  Description: INTERVENTIONS:  - Identify barriers to discharge w/patient and caregiver  - Arrange for needed discharge resources and transportation as appropriate  - Identify discharge learning needs (meds, wound care, etc.)  - Arrange for interpretive services to assist at discharge as needed  - Refer to Case Management Department for coordinating discharge planning if the patient needs post-hospital services based on physician/advanced practitioner order or complex needs related to functional status, cognitive ability, or social support system  Outcome: Completed     Problem: Knowledge Deficit  Goal: Patient/family/caregiver demonstrates understanding of disease process, treatment plan, medications, and discharge instructions  Description: Complete learning assessment and assess knowledge base.  Interventions:  - Provide teaching at level of understanding  - Provide teaching via preferred learning methods  Outcome: Adequate for Discharge     Problem: RESPIRATORY - ADULT  Goal: Achieves optimal ventilation and oxygenation  Description: INTERVENTIONS:  - Assess for changes in respiratory status  - Assess for changes in mentation and behavior  -  Position to facilitate oxygenation and minimize respiratory effort  - Oxygen administered by appropriate delivery if ordered  - Initiate smoking cessation education as indicated  - Encourage broncho-pulmonary hygiene including cough, deep breathe, Incentive Spirometry  - Assess the need for suctioning and aspirate as needed  - Assess and instruct to report SOB or any respiratory difficulty  - Respiratory Therapy support as indicated  Outcome: Adequate for Discharge     Problem: METABOLIC, FLUID AND ELECTROLYTES - ADULT  Goal: Electrolytes maintained within normal limits  Description: INTERVENTIONS:  - Monitor labs and assess patient for signs and symptoms of electrolyte imbalances  - Administer electrolyte replacement as ordered  - Monitor response to electrolyte replacements, including repeat lab results as appropriate  - Instruct patient on fluid and nutrition as appropriate  Outcome: Adequate for Discharge  Goal: Fluid balance maintained  Description: INTERVENTIONS:  - Monitor labs   - Monitor I/O and WT  - Instruct patient on fluid and nutrition as appropriate  - Assess for signs & symptoms of volume excess or deficit  Outcome: Adequate for Discharge     Problem: HEMATOLOGIC - ADULT  Goal: Maintains hematologic stability  Description: INTERVENTIONS  - Assess for signs and symptoms of bleeding or hemorrhage  - Monitor labs  - Administer supportive blood products/factors as ordered and appropriate  Outcome: Adequate for Discharge     Problem: Prexisting or High Potential for Compromised Skin Integrity  Goal: Skin integrity is maintained or improved  Description: INTERVENTIONS:  - Identify patients at risk for skin breakdown  - Assess and monitor skin integrity  - Assess and monitor nutrition and hydration status  - Monitor labs   - Assess for incontinence   - Turn and reposition patient  - Assist with mobility/ambulation  - Relieve pressure over bony prominences  - Avoid friction and shearing  - Provide  appropriate hygiene as needed including keeping skin clean and dry  - Evaluate need for skin moisturizer/barrier cream  - Collaborate with interdisciplinary team   - Patient/family teaching  - Consider wound care consult   Outcome: Adequate for Discharge     Problem: Nutrition/Hydration-ADULT  Goal: Nutrient/Hydration intake appropriate for improving, restoring or maintaining nutritional needs  Description: Monitor and assess patient's nutrition/hydration status for malnutrition. Collaborate with interdisciplinary team and initiate plan and interventions as ordered.  Monitor patient's weight and dietary intake as ordered or per policy. Utilize nutrition screening tool and intervene as necessary. Determine patient's food preferences and provide high-protein, high-caloric foods as appropriate.     INTERVENTIONS:  - Monitor oral intake, urinary output, labs, and treatment plans  - Assess nutrition and hydration status and recommend course of action  - Evaluate amount of meals eaten  - Assist patient with eating if necessary   - Allow adequate time for meals  - Recommend/ encourage appropriate diets, oral nutritional supplements, and vitamin/mineral supplements  - Order, calculate, and assess calorie counts as needed  - Recommend, monitor, and adjust tube feedings and TPN/PPN based on assessed needs  - Assess need for intravenous fluids  - Provide specific nutrition/hydration education as appropriate  - Include patient/family/caregiver in decisions related to nutrition  Outcome: Adequate for Discharge

## 2024-01-06 NOTE — PLAN OF CARE
Problem: Potential for Falls  Goal: Patient will remain free of falls  Description: INTERVENTIONS:  - Educate patient/family on patient safety including physical limitations  - Instruct patient to call for assistance with activity   - Consult OT/PT to assist with strengthening/mobility   - Keep Call bell within reach  - Keep bed low and locked with side rails adjusted as appropriate  - Keep care items and personal belongings within reach  - Initiate and maintain comfort rounds  - Make Fall Risk Sign visible to staff  - Offer Toileting every  Hours, in advance of need  - Initiate/Maintain alarm  - Obtain necessary fall risk management equipment:   - Apply yellow socks and bracelet for high fall risk patients  - Consider moving patient to room near nurses station  Outcome: Progressing     Problem: PAIN - ADULT  Goal: Verbalizes/displays adequate comfort level or baseline comfort level  Description: Interventions:  - Encourage patient to monitor pain and request assistance  - Assess pain using appropriate pain scale  - Administer analgesics based on type and severity of pain and evaluate response  - Implement non-pharmacological measures as appropriate and evaluate response  - Consider cultural and social influences on pain and pain management  - Notify physician/advanced practitioner if interventions unsuccessful or patient reports new pain  Outcome: Progressing     Problem: INFECTION - ADULT  Goal: Absence or prevention of progression during hospitalization  Description: INTERVENTIONS:  - Assess and monitor for signs and symptoms of infection  - Monitor lab/diagnostic results  - Monitor all insertion sites, i.e. indwelling lines, tubes, and drains  - Monitor endotracheal if appropriate and nasal secretions for changes in amount and color  - [place] appropriate cooling/warming therapies per order  - Administer medications as ordered  - Instruct and encourage patient and family to use good hand hygiene technique  -  Identify and instruct in appropriate isolation precautions for identified infection/condition  Outcome: Progressing  Goal: Absence of fever/infection during neutropenic period  Description: INTERVENTIONS:  - Monitor WBC    Outcome: Progressing     Problem: SAFETY ADULT  Goal: Patient will remain free of falls  Description: INTERVENTIONS:  - Educate patient/family on patient safety including physical limitations  - Instruct patient to call for assistance with activity   - Consult OT/PT to assist with strengthening/mobility   - Keep Call bell within reach  - Keep bed low and locked with side rails adjusted as appropriate  - Keep care items and personal belongings within reach  - Initiate and maintain comfort rounds  - Make Fall Risk Sign visible to staff  - Offer Toileting every  Hours, in advance of need  - Initiate/Maintain alarm  - Obtain necessary fall risk management equipment: Apply yellow socks and bracelet for high fall risk patients  - Consider moving patient to room near nurses station  Outcome: Progressing  Goal: Maintain or return to baseline ADL function  Description: INTERVENTIONS:  -  Assess patient's ability to carry out ADLs; assess patient's baseline for ADL function and identify physical deficits which impact ability to perform ADLs (bathing, care of mouth/teeth, toileting, grooming, dressing, etc.)  - Assess/evaluate cause of self-care deficits   - Assess range of motion  - Assess patient's mobility; develop plan if impaired  - Assess patient's need for assistive devices and provide as appropriate  - Encourage maximum independence but intervene and supervise when necessary  - Involve family in performance of ADLs  - Assess for home care needs following discharge   - Consider OT consult to assist with ADL evaluation and planning for discharge  - Provide patient education as appropriate  Outcome: Progressing  Goal: Maintains/Returns to pre admission functional level  Description: INTERVENTIONS:  -  Perform AM-PAC 6 Click Basic Mobility/ Daily Activity assessment daily.  - Set and communicate daily mobility goal to care team and patient/family/caregiver.   - Collaborate with rehabilitation services on mobility goals if consulted  - Perform Range of Motion times a day.  - Reposition patient every  hours.  - Dangle patient  times a day  - Stand patient  times a day  - Ambulate patient  times a day  - Out of bed to chair  times a day   - Out of bed for meals  times a day  - Out of bed for toileting  - Record patient progress and toleration of activity level   Outcome: Progressing     Problem: DISCHARGE PLANNING  Goal: Discharge to home or other facility with appropriate resources  Description: INTERVENTIONS:  - Identify barriers to discharge w/patient and caregiver  - Arrange for needed discharge resources and transportation as appropriate  - Identify discharge learning needs (meds, wound care, etc.)  - Arrange for interpretive services to assist at discharge as needed  - Refer to Case Management Department for coordinating discharge planning if the patient needs post-hospital services based on physician/advanced practitioner order or complex needs related to functional status, cognitive ability, or social support system  Outcome: Progressing     Problem: Knowledge Deficit  Goal: Patient/family/caregiver demonstrates understanding of disease process, treatment plan, medications, and discharge instructions  Description: Complete learning assessment and assess knowledge base.  Interventions:  - Provide teaching at level of understanding  - Provide teaching via preferred learning methods  Outcome: Progressing     Problem: RESPIRATORY - ADULT  Goal: Achieves optimal ventilation and oxygenation  Description: INTERVENTIONS:  - Assess for changes in respiratory status  - Assess for changes in mentation and behavior  - Position to facilitate oxygenation and minimize respiratory effort  - Oxygen administered by appropriate  delivery if ordered  - Initiate smoking cessation education as indicated  - Encourage broncho-pulmonary hygiene including cough, deep breathe, Incentive Spirometry  - Assess the need for suctioning and aspirate as needed  - Assess and instruct to report SOB or any respiratory difficulty  - Respiratory Therapy support as indicated  Outcome: Progressing     Problem: Prexisting or High Potential for Compromised Skin Integrity  Goal: Skin integrity is maintained or improved  Description: INTERVENTIONS:  - Identify patients at risk for skin breakdown  - Assess and monitor skin integrity  - Assess and monitor nutrition and hydration status  - Monitor labs   - Assess for incontinence   - Turn and reposition patient  - Assist with mobility/ambulation  - Relieve pressure over bony prominences  - Avoid friction and shearing  - Provide appropriate hygiene as needed including keeping skin clean and dry  - Evaluate need for skin moisturizer/barrier cream  - Collaborate with interdisciplinary team   - Patient/family teaching  - Consider wound care consult   Outcome: Progressing     Problem: METABOLIC, FLUID AND ELECTROLYTES - ADULT  Goal: Electrolytes maintained within normal limits  Description: INTERVENTIONS:  - Monitor labs and assess patient for signs and symptoms of electrolyte imbalances  - Administer electrolyte replacement as ordered  - Monitor response to electrolyte replacements, including repeat lab results as appropriate  - Instruct patient on fluid and nutrition as appropriate  Outcome: Progressing  Goal: Fluid balance maintained  Description: INTERVENTIONS:  - Monitor labs   - Monitor I/O and WT  - Instruct patient on fluid and nutrition as appropriate  - Assess for signs & symptoms of volume excess or deficit  Outcome: Progressing     Problem: HEMATOLOGIC - ADULT  Goal: Maintains hematologic stability  Description: INTERVENTIONS  - Assess for signs and symptoms of bleeding or hemorrhage  - Monitor labs  -  Administer supportive blood products/factors as ordered and appropriate  Outcome: Progressing     Problem: Nutrition/Hydration-ADULT  Goal: Nutrient/Hydration intake appropriate for improving, restoring or maintaining nutritional needs  Description: Monitor and assess patient's nutrition/hydration status for malnutrition. Collaborate with interdisciplinary team and initiate plan and interventions as ordered.  Monitor patient's weight and dietary intake as ordered or per policy. Utilize nutrition screening tool and intervene as necessary. Determine patient's food preferences and provide high-protein, high-caloric foods as appropriate.     INTERVENTIONS:  - Monitor oral intake, urinary output, labs, and treatment plans  - Assess nutrition and hydration status and recommend course of action  - Evaluate amount of meals eaten  - Assist patient with eating if necessary   - Allow adequate time for meals  - Recommend/ encourage appropriate diets, oral nutritional supplements, and vitamin/mineral supplements  - Order, calculate, and assess calorie counts as needed  - Recommend, monitor, and adjust tube feedings and TPN/PPN based on assessed needs  - Assess need for intravenous fluids  - Provide specific nutrition/hydration education as appropriate  - Include patient/family/caregiver in decisions related to nutrition  Outcome: Progressing

## 2024-01-07 VITALS
TEMPERATURE: 98.5 F | BODY MASS INDEX: 23.05 KG/M2 | HEIGHT: 63 IN | RESPIRATION RATE: 16 BRPM | SYSTOLIC BLOOD PRESSURE: 121 MMHG | DIASTOLIC BLOOD PRESSURE: 71 MMHG | HEART RATE: 88 BPM | WEIGHT: 130.07 LBS | OXYGEN SATURATION: 95 %

## 2024-01-07 LAB
BACTERIA BLD CULT: NORMAL
BACTERIA BLD CULT: NORMAL

## 2024-01-07 PROCEDURE — 94762 N-INVAS EAR/PLS OXIMTRY CONT: CPT

## 2024-01-07 PROCEDURE — 99239 HOSP IP/OBS DSCHRG MGMT >30: CPT | Performed by: INTERNAL MEDICINE

## 2024-01-07 PROCEDURE — 94640 AIRWAY INHALATION TREATMENT: CPT

## 2024-01-07 PROCEDURE — 94760 N-INVAS EAR/PLS OXIMETRY 1: CPT

## 2024-01-07 RX ADMIN — LOSARTAN POTASSIUM 50 MG: 50 TABLET, FILM COATED ORAL at 08:52

## 2024-01-07 RX ADMIN — POLYETHYLENE GLYCOL 3350 17 G: 17 POWDER, FOR SOLUTION ORAL at 08:54

## 2024-01-07 RX ADMIN — LEVALBUTEROL HYDROCHLORIDE 1.25 MG: 1.25 SOLUTION RESPIRATORY (INHALATION) at 07:16

## 2024-01-07 RX ADMIN — HEPARIN SODIUM 5000 UNITS: 5000 INJECTION INTRAVENOUS; SUBCUTANEOUS at 06:17

## 2024-01-07 RX ADMIN — PANTOPRAZOLE SODIUM 40 MG: 40 TABLET, DELAYED RELEASE ORAL at 06:17

## 2024-01-07 RX ADMIN — PREDNISONE 40 MG: 20 TABLET ORAL at 08:52

## 2024-01-07 RX ADMIN — IPRATROPIUM BROMIDE 0.5 MG: 0.5 SOLUTION RESPIRATORY (INHALATION) at 07:16

## 2024-01-07 RX ADMIN — SENNOSIDES AND DOCUSATE SODIUM 2 TABLET: 8.6; 5 TABLET ORAL at 08:52

## 2024-01-07 NOTE — DISCHARGE SUMMARY
Discharge Summary - Severiano Feliciano, 1935, [id number]        Admission Date: 1/1/2024  Discharge Date: 1/7/2024      Discharge Diagnosis:   1.  Chronic obstructive pulmonary disease with acute exacerbation.  2.  Acute on chronic respiratory failure with hypoxia and hypercarbia  3.  Metabolic encephalopathy secondary to #2  4.  Chronic diastolic congestive heart failure  5.  Hypertension  6.  History of DVT.  7.  Rectal bleeding.    Consulting Physicians:  1.  Dr. Keene, gastroenterology  2.  Dr. Jessie Casanova, palliative care  The patient was initially admitted to the critical care service.    Procedures Performed:   None    HPI: The patient is an 88-year-old man who came to the emergency room because of shortness of breath.  He was noted to be confused at the time of admission.  Evaluation in the emergency room showed worsening hypercapnia.  He was placed on BiPAP and admitted to the ICU.    Hospital Course: The patient was admitted to the ICU monitored carefully.  He was treated with bronchodilators, steroids, BiPAP, etc.  With these measures he did improve somewhat.  The patient's underlying disease is severe.  He was seen by palliative care and the possibility of hospice care was discussed with him and his family.  They decided against this.    The patient was evaluated for BiPAP with nocturnal oximetry and arterial blood gas.  He was able to qualify for this and this will be delivered to his home.    The patient had metabolic encephalopathy at the time of admission related to his hypercarbia.  This improved with treatment of his lung disease.    The patient had some rectal bleeding during his stay.  He was evaluated by GI.  No endoscopic evaluation was recommended considering his clinical condition.    The patient's other medical issues remained stable during his hospitalization.  At the time of discharge she was feeling reasonably well.  Vital signs were stable.  Lungs were clear with  diminished breath sounds.  Cardiac exam revealed a regular rhythm.  I heard no murmur or gallop.  The abdomen is soft with active bowel sounds.  There was no tenderness.  There was no peripheral edema.    Disposition: The patient was discharged home on January 7.  Diet and activity will be as tolerated.  He remains on supplemental oxygen and will have BiPAP at home.  He was asked to arrange follow-up with his personal physician, Dr. Kirby Casanova, within 1 week.  He will also continue to follow with [facility] pulmonary group.    Discharge instructions/Information to patient and family:   See after visit summary for information provided to patient and family.      Provisions for Follow-Up Care:  See after visit summary for information related to follow-up care and any pertinent home health orders.      Planned Readmission: No    Discharge Statement   I spent 40 minutes discharging the patient. This time was spent on the day of discharge. I had direct contact with the patient on the day of discharge.     Discharge Medications:  See after visit summary for reconciled discharge medications provided to patient and family.

## 2024-01-07 NOTE — PLAN OF CARE
Problem: OCCUPATIONAL THERAPY ADULT  Goal: Performs self-care activities at highest level of function for planned discharge setting.  See evaluation for individualized goals.  Description:   Outcome: Adequate for Discharge     Problem: PHYSICAL THERAPY ADULT  Goal: Performs mobility at highest level of function for planned discharge setting.  See evaluation for individualized goals.  Description: Treatment/Interventions: Functional transfer training, LE strengthening/ROM, Elevations, Therapeutic exercise, Endurance training, Patient/family training, Bed mobility, Gait training, Spoke to nursing, OT  Equipment Recommended: Walker (need to clarify if pt owns walker)       See flowsheet documentation for full assessment, interventions and recommendations.  Outcome: Adequate for Discharge

## 2024-01-07 NOTE — CASE MANAGEMENT
Case Management Discharge Planning Note    Patient name Severiano Feliciano  Location East 4 /E4 -* MRN [id number]  : 1935 Date 2024       Current Admission Date: 2024  Current Admission Diagnosis:Acute on chronic respiratory failure with hypoxia and hypercapnia (HCC)   Patient Active Problem List    Diagnosis Date Noted    Macrocytosis 2023    Aspiration into airway 2023    Constipation 2023    Prediabetes 10/25/2023    Bilateral hearing loss 10/25/2023    PAC (premature atrial contraction) 2023    Hypoxemia 2023    Vitamin B12 deficiency 2023    Vitamin D deficiency 2023    Chronic kidney disease, stage 3a (HCC) 2023    Weight loss 2022    Acute on chronic respiratory failure with hypoxia and hypercapnia (HCC) 2022    Supplemental oxygen dependent 2022    Rectal bleeding 2021    Hyperlipidemia 2021    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    Metabolic encephalopathy 2020    Dysphagia 2020    TIA (transient ischemic attack) 2020    S/P hernia repair 2019    Acquired renal cyst of left kidney 2019    Chronic diastolic CHF (congestive heart failure) ([facility]) 2019    Thrombocytopenia ([facility]) 2018    Varicose veins of both lower extremities with pain 2018    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HCC) 2018    Chronic respiratory failure with hypoxia ([facility]) 2018    Descending aortic aneurysm (HCC) 2018    History of DVT (deep vein thrombosis) 2018    Chronic obstructive pulmonary disease with acute exacerbation (HCC) 2018    Benign essential hypertension 2017      LOS (days): 5  Geometric Mean LOS (GMLOS) (days): 3.6  Days to GMLOS:-2     OBJECTIVE:  Risk of Unplanned Readmission Score: 31.33         Current admission status: Inpatient   Preferred Pharmacy:   [facility] - [place] - [id number]  Pt rang to advise that Shoaib Billy, brother brought him "All new clothes, he told me to throw everything out except my shoes and money " This author, updated navigator admission, took shoes and money clint[ out  Pt placed money clip in table drawer  Placed medications in pharmacy bag to be taken to pharmacy  [address]  [address]  [address]  [place] [address]  Phone: [phone] Fax: [phone]    [facility] - [place] - [address]  [address]  [place] [address]  Phone: [phone] Fax: [phone]    Primary Care Provider: Kirby Casanova MD    Primary Insurance: [employer]ICARE [facility]  Secondary Insurance: [facility]    DISCHARGE DETAILS:    Discharge planning discussed with:: son and daughter  Freedom of Choice: Yes  Comments - Freedom of Choice: patient adamant about discharging today. BIPAP order is pending with [employer].  CM contacted family/caregiver?: Yes  Were Treatment Team discharge recommendations reviewed with patient/caregiver?: Yes  Did patient/caregiver verbalize understanding of patient care needs?: Yes  Were patient/caregiver advised of the risks associated with not following Treatment Team discharge recommendations?: Yes    Contacts  Patient Contacts: Son Sukumar and daughter Hyacinth  Relationship to Patient:: Family  Contact Method: Phone, In Person  Phone Number: [phone]  Reason/Outcome: Continuity of Care, Discharge Planning         DME Referral Provided  Referral made for DME?: Yes  DME referral completed for the following items:: BiPAP  DME Supplier Name:: [employer]    Other Referral/Resources/Interventions Provided:  Interventions: DME         Treatment Team Recommendation: Home  Discharge Destination Plan:: Home  Transport at Discharge : Family            CM notified via [place] by [employer] that the order has been processed and will reach out to patient/family for delivery. Patient discharged already but SLIM, Pulm and RN still made aware.

## 2024-01-07 NOTE — CASE MANAGEMENT
Case Management Discharge Planning Note    Patient name Severiano Feliciano  Location [facility] 4 /E4 -* MRN [id number]  : 1935 Date 2024       Current Admission Date: 2024  Current Admission Diagnosis:Acute on chronic respiratory failure with hypoxia and hypercapnia (HCC)   Patient Active Problem List    Diagnosis Date Noted    Macrocytosis 2023    Aspiration into airway 2023    Constipation 2023    Prediabetes 10/25/2023    Bilateral hearing loss 10/25/2023    PAC (premature atrial contraction) 2023    Hypoxemia 2023    Vitamin B12 deficiency 2023    Vitamin D deficiency 2023    Chronic kidney disease, stage 3a (HCC) 2023    Weight loss 2022    Acute on chronic respiratory failure with hypoxia and hypercapnia (HCC) 2022    Supplemental oxygen dependent 2022    Rectal bleeding 2021    Hyperlipidemia 2021    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    Metabolic encephalopathy 2020    Dysphagia 2020    TIA (transient ischemic attack) 2020    S/P hernia repair 2019    Acquired renal cyst of left kidney 2019    Chronic diastolic CHF (congestive heart failure) ([facility]) 2019    Thrombocytopenia ([facility]) 2018    Varicose veins of both lower extremities with pain 2018    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HCC) 2018    Chronic respiratory failure with hypoxia ([facility]) 2018    Descending aortic aneurysm (HCC) 2018    History of DVT (deep vein thrombosis) 2018    Chronic obstructive pulmonary disease with acute exacerbation (HCC) 2018    Benign essential hypertension 2017      LOS (days): 5  Geometric Mean LOS (GMLOS) (days): 3.6  Days to GMLOS:-2     OBJECTIVE:  Risk of Unplanned Readmission Score: 31.28         Current admission status: Inpatient   Preferred Pharmacy:   [facility] - [place] - [id number]  [address]  [address]  [place] [address]  Phone: [phone] Fax: [phone]    [facility] - [place] - [address]  [address]  [place] [address]  Phone: [phone] Fax: [phone]    Primary Care Provider: Kirby Casanova MD    Primary Insurance: [employer] MEDICARE [facility]  Secondary Insurance: [facility]    DISCHARGE DETAILS:    Discharge planning discussed with:: son and daughter  Freedom of Choice: Yes  Comments - Freedom of Choice: patient adamant about discharging today. BIPAP order is pending with [employer].  CM contacted family/caregiver?: Yes  Were Treatment Team discharge recommendations reviewed with patient/caregiver?: Yes  Did patient/caregiver verbalize understanding of patient care needs?: Yes  Were patient/caregiver advised of the risks associated with not following Treatment Team discharge recommendations?: Yes    Contacts  Patient Contacts: Son Sukumar and daughter Hyacinth  Relationship to Patient:: Family  Contact Method: Phone, In Person  Phone Number: [phone]  Reason/Outcome: Continuity of Care, Discharge Planning         DME Referral Provided  Referral made for DME?: Yes  DME referral completed for the following items:: BiPAP  DME Supplier Name:: [employer]    Other Referral/Resources/Interventions Provided:  Interventions: DME         Treatment Team Recommendation: Home  Discharge Destination Plan:: Home  Transport at Discharge : Family         Patient received an overnight study for BIPAP. GANGA communicated with Pulm who believes that patient qualifies for the BIPAP. CM placed a BIPAP order via [place] to [facility]. GANGA notified by RN that patient and family want patient to discharge today and continues to wait for clearance to go home. GANGA spoke with patient's son, Sukumar outside of CM office. He said that he has to work today and was going to transport patient home. He said that  patient really wants to go home today. GANGA explained that the BIPAP has not been approved yet with [employer]. CM provided Sukumar with [employer]'s phone number to check on the status of the BIPAP. Sukumar stated that he will leave and have someone else pick patient up later today.       CM notified that patient's daughter is on the phone and is asking if patient can come home. GANGA spoke with patient's daughter, Hyacinth at [phone] and she asked if patient can come home and receive the BIPAP later. CM confirmed with Pulm and SLIM that patient is able to discharge without the BIPAP at this time. BIPAP could be delivered today or tomorrow once approved. CM notified RN and Hyacinth via telephone.

## 2024-01-08 ENCOUNTER — TRANSITIONAL CARE MANAGEMENT (OUTPATIENT)
Dept: FAMILY MEDICINE CLINIC | Facility: CLINIC | Age: 89
End: 2024-01-08

## 2024-01-08 LAB

## 2024-01-09 ENCOUNTER — PATIENT OUTREACH (OUTPATIENT)
Dept: FAMILY MEDICINE CLINIC | Facility: CLINIC | Age: 89
End: 2024-01-09

## 2024-01-09 NOTE — UTILIZATION REVIEW
NOTIFICATION OF ADMISSION DISCHARGE   This is a Notification of Discharge from [facility]. Please be advised that this patient has been discharge from our facility. Below you will find the admission and discharge date and time including the patient’s disposition.   UTILIZATION REVIEW CONTACT:  Tamika Dominguez  Utilization   Network Utilization Review Department  Phone: [phone] x carefully listen to the prompts. All voicemails are confidential.  Email: [email or web address]     ADMISSION INFORMATION  PRESENTATION DATE: 1/1/2024  7:57 PM  OBERVATION ADMISSION DATE:   INPATIENT ADMISSION DATE: 1/2/24  1:51 AM   DISCHARGE DATE: 1/7/2024  3:18 PM   DISPOSITION:Home/Self Care    Network Utilization Review Department  ATTENTION: Please call with any questions or concerns to [phone] and carefully listen to the prompts so that you are directed to the right person. All voicemails are confidential.   For Discharge needs, contact Care Management DC Support Team at [phone] opt. 2  Send all requests for admission clinical reviews, approved or denied determinations and any other requests to dedicated fax number below belonging to the campus where the patient is receiving treatment. List of dedicated fax numbers for the Facilities:  FACILITY NAME UR FAX NUMBER   ADMISSION DENIALS (Administrative/Medical Necessity) [phone]   DISCHARGE SUPPORT TEAM ([facility]) [phone]   PARENT CHILD HEALTH (Maternity/NICU/Pediatrics) [phone]   [facility] [phone]   [facility] [phone]   [facility] [phone]   [facility] [phone]   [facility] [phone]   [facility] [phone]   General acute hospital [phone]   [facility] [phone]   [facility]  [facility] [phone]   [facility] [phone]   [facility] [phone]

## 2024-01-10 DIAGNOSIS — K29.50 CHRONIC GASTRITIS WITHOUT BLEEDING, UNSPECIFIED GASTRITIS TYPE: ICD-10-CM

## 2024-01-10 RX ORDER — PANTOPRAZOLE SODIUM 40 MG/1
40 TABLET, DELAYED RELEASE ORAL DAILY
Qty: 30 TABLET | Refills: 5 | Status: SHIPPED | OUTPATIENT
Start: 2024-01-10

## 2024-01-11 RX ORDER — IPRATROPIUM BROMIDE AND ALBUTEROL SULFATE 2.5; .5 MG/3ML; MG/3ML
SOLUTION RESPIRATORY (INHALATION)
COMMUNITY
Start: 2023-12-31

## 2024-01-11 RX ORDER — POLYETHYLENE GLYCOL 3350 17 G/17G
POWDER, FOR SOLUTION ORAL
COMMUNITY
Start: 2023-12-16

## 2024-01-11 RX ORDER — BRIMONIDINE TARTRATE 2 MG/ML
SOLUTION/ DROPS OPHTHALMIC
COMMUNITY
Start: 2023-12-28

## 2024-01-11 RX ORDER — FLUTICASONE FUROATE, UMECLIDINIUM BROMIDE AND VILANTEROL TRIFENATATE 200; 62.5; 25 UG/1; UG/1; UG/1
POWDER RESPIRATORY (INHALATION)
COMMUNITY
Start: 2023-12-29

## 2024-01-12 ENCOUNTER — TELEPHONE (OUTPATIENT)
Dept: FAMILY MEDICINE CLINIC | Facility: CLINIC | Age: 89
End: 2024-01-12

## 2024-01-12 NOTE — TELEPHONE ENCOUNTER
Patient son was called. Spoke to derba and advised him that his fathers insurance has a another PCP. I reminded him that was an issue on the last ov and that I had advised then that the PCP needed to be changed then. Debra states that he will call them now and I ask that he please get a name and  number for verification of change. He understands

## 2024-01-15 ENCOUNTER — OFFICE VISIT (OUTPATIENT)
Dept: FAMILY MEDICINE CLINIC | Facility: CLINIC | Age: 89
End: 2024-01-15
Payer: MEDICARE

## 2024-01-15 ENCOUNTER — TELEPHONE (OUTPATIENT)
Dept: FAMILY MEDICINE CLINIC | Facility: CLINIC | Age: 89
End: 2024-01-15

## 2024-01-15 VITALS
OXYGEN SATURATION: 91 % | BODY MASS INDEX: 24.27 KG/M2 | HEIGHT: 63 IN | DIASTOLIC BLOOD PRESSURE: 80 MMHG | HEART RATE: 96 BPM | WEIGHT: 137 LBS | SYSTOLIC BLOOD PRESSURE: 140 MMHG | RESPIRATION RATE: 24 BRPM | TEMPERATURE: 98 F

## 2024-01-15 DIAGNOSIS — J44.1 CHRONIC OBSTRUCTIVE PULMONARY DISEASE WITH ACUTE EXACERBATION (HCC): ICD-10-CM

## 2024-01-15 DIAGNOSIS — J96.11 CHRONIC RESPIRATORY FAILURE WITH HYPOXIA (HCC): Primary | ICD-10-CM

## 2024-01-15 DIAGNOSIS — D69.6 THROMBOCYTOPENIA, UNSPECIFIED (HCC): ICD-10-CM

## 2024-01-15 DIAGNOSIS — E43 UNSPECIFIED SEVERE PROTEIN-CALORIE MALNUTRITION (HCC): ICD-10-CM

## 2024-01-15 DIAGNOSIS — R73.03 PREDIABETES: ICD-10-CM

## 2024-01-15 DIAGNOSIS — D69.6 THROMBOCYTOPENIA (HCC): ICD-10-CM

## 2024-01-15 DIAGNOSIS — I50.32 CHRONIC DIASTOLIC (CONGESTIVE) HEART FAILURE (HCC): ICD-10-CM

## 2024-01-15 PROCEDURE — 99496 TRANSJ CARE MGMT HIGH F2F 7D: CPT | Performed by: PHYSICIAN ASSISTANT

## 2024-01-15 NOTE — PROGRESS NOTES
Assessment & Plan     1. Chronic respiratory failure with hypoxia (HCC)  Assessment & Plan:  Increase to 3-4 L nasal cannula instead of 2-3L to maintain oxygen saturation greater than 90%.  Encourage continued use of BiPAP at night.    Orders:  -     Ambulatory Referral to Pulmonology; Future    2. Chronic obstructive pulmonary disease with acute exacerbation (HCC)  Assessment & Plan:  Reviewed recent hospitalization from 1/1/2024 to 1/7/2024.  Continue Trelegy once daily, patient to follow-up with pulmonology for consideration of pulmonary rehab and nebulizer regimen.  Recommend using 3 L instead of 2L at baseline and increased to 4 L due to hypoxia with exertion.    Orders:  -     Ambulatory Referral to Pulmonology; Future    3. Prediabetes  Assessment & Plan:  Lab Results   Component Value Date    HGBA1C 6.2 12/09/2022     Caution with chronic use of prednisone.  Currently on prednisone taper since discharge.      4. Chronic diastolic (congestive) heart failure (HCC)  Assessment & Plan:  Wt Readings from Last 3 Encounters:   01/15/24 62.1 kg (137 lb)   01/07/24 59 kg (130 lb 1.1 oz)   12/27/23 64.4 kg (141 lb 14.4 oz)     Continue to monitor weight.  Not requiring diuretics.  No signs of fluid overload currently.            5. Unspecified severe protein-calorie malnutrition (HCC)  Assessment & Plan:  Lab Results   Component Value Date     03/01/2014    SODIUM 143 01/06/2024    K 3.8 01/06/2024    CL 99 01/06/2024    CO2 43 (H) 01/06/2024    ANIONGAP 0 (L) 03/01/2014    AGAP 1 01/06/2024    BUN 30 (H) 01/06/2024    CREATININE 0.82 01/06/2024    GLUC 101 01/06/2024    GLUF 109 (H) 06/12/2020    CALCIUM 9.2 01/06/2024    AST 11 (L) 01/06/2024    ALT 12 01/06/2024    ALKPHOS 50 01/06/2024    TP 5.7 (L) 01/06/2024    TBILI 0.38 01/06/2024    EGFR 80 01/06/2024     Encourage increased protein in diet.        6. Thrombocytopenia, unspecified (HCC)    7. Thrombocytopenia (HCC)  Assessment & Plan:  Lab Results    Component Value Date    WBC 6.60 01/06/2024    HGB 11.5 (L) 01/06/2024    HCT 37.8 01/06/2024     (H) 01/06/2024     (L) 01/06/2024     Stable.            Subjective     Transitional Care Management Review:   Severiano Feliciano is a 88 y.o. male here for TCM follow up.     During the TCM phone call patient stated:  TCM Call     Date and time call was made  1/8/2024  9:34 AM    Hospital care reviewed  Records reviewed    Patient was hospitial[facility] at  [facility]    Date of Admission  01/01/24    Date of discharge  01/07/24    Diagnosis  Chronic obstructive pulmonary disease with acute exacerbation    Disposition  Home    Current Symptoms  None      TCM Call     Post hospital issues  None    Should patient be enrolled in anticoag monitoring?  No    Scheduled for follow up?  Yes    Patients specialists  Pulmonlolgist    Did you obtain your prescribed medications  Yes    Do you need help managing your prescriptions or medications  No    Is transportation to your appointment needed  No    I have advised the patient to call PCP with any new or worsening symptoms  [facility]    Living Arrangements  Family members    Have you fallen in the last 12 months  No    Interperter language line needed  No        On arrival to vital sign room, patient oxygen 76% on oxygen from home 3 liters. Placed on office tank 3 liters, after two minutes sitting oxygen up to 91%. Patient walked short distance to next room to see provider, oxygen again dropped to 76%. With sitting, on 3 liters patient returned to 98%.     Severiano is a 88 y.o. male with a h/o COPD, former smoker, chronic hypoxia on home oxygen, CHF who presents after recent hospitalization from 1/1/2024 to 1/7/2024 due to acute on chronic respiratory failure with hypercapnia, altered mental status.  Prior to this he had hospitalization in 11/20/2023 and 12/20/2023 and ED visit on 12/27/2023 for similar rectal bleeding and COPD exacerbation.  No  "signs of pneumonia or CHF exacerbation during this recent hospitalization.  Difficulty hearing. Using 2-3 L oxygen at home. No chest pain. No falls, typically likes to walk in the park but has been unable to do due to breathing and cold weather. Good appetite.  Had bright red stools but was not considered candidate for colonoscopy or endoscopy.  No bowel movement since discharge.  Mental status has been at baseline since discharge.  Not interested in hospice. Recently got new BiPAP and still adjusting to mask.  Feels like it is helping.  Sleeps with his pet cat and dog in his room and refuses to make them leave.  Family thinks may be contributing to worsening breathing.  No cough.  No leg swelling.    Wife and son present for exam and helped provide a portion of history in [place].   History was conducted in [place] without the use of .    Goals of care discussed.  Patient and family still interested in all medical treatment possible and recurrent hospitalization as necessary.      Review of Systems   Constitutional:  Negative for activity change, chills, fever and unexpected weight change.   HENT:  Positive for hearing loss. Negative for sinus pressure, sinus pain, sore throat and trouble swallowing.    Respiratory:  Negative for cough and shortness of breath.         Patient reports breathing is at baseline   Cardiovascular:  Negative for chest pain.   Gastrointestinal:  Positive for blood in stool. Negative for constipation, diarrhea and rectal pain.   Genitourinary:  Negative for dysuria.   Skin:  Negative for rash.   Neurological:  Negative for dizziness and light-headedness.   Psychiatric/Behavioral:  Negative for dysphoric mood and sleep disturbance. The patient is not nervous/anxious.        Objective     /80 (BP Location: Left arm, Patient Position: Sitting, Cuff Size: Standard)   Pulse 96   Temp 98 °F (36.7 °C) (Tympanic)   Resp (!) 24   Ht 5' 3\" (1.6 m)   Wt 62.1 kg (137 lb)   SpO2 " 91% Comment: oxygen 3 liters  BMI 24.27 kg/m²      Physical Exam  Vitals reviewed.   Constitutional:       Appearance: Normal appearance. He is ill-appearing (Chronically).   HENT:      Head: Normocephalic and atraumatic.      Ears:      Comments: Poor hearing  Cardiovascular:      Rate and Rhythm: Normal rate and regular rhythm.   Pulmonary:      Effort: Pulmonary effort is normal.      Breath sounds: Normal breath sounds.      Comments: on 3 L nasal cannula oxygen tank  Neurological:      Mental Status: He is alert and oriented to person, place, and time. Mental status is at baseline.      Gait: Gait abnormal (Ambulating slowly without cane or walker).       Medications have been reviewed by provider in current encounter    Jessie Menendez PA-C

## 2024-01-16 PROBLEM — R09.02 HYPOXEMIA: Status: RESOLVED | Noted: 2023-04-30 | Resolved: 2024-01-16

## 2024-01-16 PROBLEM — T17.908A ASPIRATION INTO AIRWAY: Status: RESOLVED | Noted: 2023-12-05 | Resolved: 2024-01-16

## 2024-01-16 NOTE — ASSESSMENT & PLAN NOTE
Lab Results   Component Value Date    WBC 6.60 01/06/2024    HGB 11.5 (L) 01/06/2024    HCT 37.8 01/06/2024     (H) 01/06/2024     (L) 01/06/2024     Stable.

## 2024-01-16 NOTE — ASSESSMENT & PLAN NOTE
Lab Results   Component Value Date     03/01/2014    SODIUM 143 01/06/2024    K 3.8 01/06/2024    CL 99 01/06/2024    CO2 43 (H) 01/06/2024    ANIONGAP 0 (L) 03/01/2014    AGAP 1 01/06/2024    BUN 30 (H) 01/06/2024    CREATININE 0.82 01/06/2024    GLUC 101 01/06/2024    GLUF 109 (H) 06/12/2020    CALCIUM 9.2 01/06/2024    AST 11 (L) 01/06/2024    ALT 12 01/06/2024    ALKPHOS 50 01/06/2024    TP 5.7 (L) 01/06/2024    TBILI 0.38 01/06/2024    EGFR 80 01/06/2024     Encourage increased protein in diet.

## 2024-01-16 NOTE — ASSESSMENT & PLAN NOTE
Lab Results   Component Value Date    HGBA1C 6.2 12/09/2022     Caution with chronic use of prednisone.  Currently on prednisone taper since discharge.

## 2024-01-16 NOTE — ASSESSMENT & PLAN NOTE
Reviewed recent hospitalization from 1/1/2024 to 1/7/2024.  Continue Trelegy once daily, patient to follow-up with pulmonology for consideration of pulmonary rehab and nebulizer regimen.  Recommend using 3 L instead of 2L at baseline and increased to 4 L due to hypoxia with exertion.

## 2024-01-16 NOTE — ASSESSMENT & PLAN NOTE
Increase to 3-4 L nasal cannula instead of 2-3L to maintain oxygen saturation greater than 90%.  Encourage continued use of BiPAP at night.

## 2024-01-16 NOTE — ASSESSMENT & PLAN NOTE
Wt Readings from Last 3 Encounters:   01/15/24 62.1 kg (137 lb)   01/07/24 59 kg (130 lb 1.1 oz)   12/27/23 64.4 kg (141 lb 14.4 oz)     Continue to monitor weight.  Not requiring diuretics.  No signs of fluid overload currently.

## 2024-01-16 NOTE — ASSESSMENT & PLAN NOTE
Lab Results   Component Value Date    EGFR 80 01/06/2024    EGFR 78 01/05/2024    EGFR 84 01/04/2024    CREATININE 0.82 01/06/2024    CREATININE 0.87 01/05/2024    CREATININE 0.72 01/04/2024

## 2024-01-26 ENCOUNTER — HOSPITAL ENCOUNTER (OUTPATIENT)
Dept: CT IMAGING | Facility: HOSPITAL | Age: 89
Discharge: HOME | End: 2024-01-26
Payer: MEDICARE

## 2024-01-26 DIAGNOSIS — H90.A31 MIXED CONDUCTIVE AND SENSORINEURAL HEARING LOSS OF RIGHT EAR WITH RESTRICTED HEARING OF LEFT EAR: ICD-10-CM

## 2024-01-26 DIAGNOSIS — H93.13 TINNITUS OF BOTH EARS: ICD-10-CM

## 2024-01-26 DIAGNOSIS — H91.93 BILATERAL HEARING LOSS, UNSPECIFIED HEARING LOSS TYPE: ICD-10-CM

## 2024-01-26 PROCEDURE — G1004 CDSM NDSC: HCPCS

## 2024-01-26 PROCEDURE — 70480 CT ORBIT/EAR/FOSSA W/O DYE: CPT

## 2024-01-29 ENCOUNTER — APPOINTMENT (EMERGENCY)
Dept: NON INVASIVE DIAGNOSTICS | Facility: HOSPITAL | Age: 89
End: 2024-01-29
Payer: MEDICARE

## 2024-01-29 ENCOUNTER — HOSPITAL ENCOUNTER (EMERGENCY)
Facility: HOSPITAL | Age: 89
Discharge: HOME | End: 2024-01-29
Attending: EMERGENCY MEDICINE
Payer: MEDICARE

## 2024-01-29 ENCOUNTER — APPOINTMENT (EMERGENCY)
Dept: CT IMAGING | Facility: HOSPITAL | Age: 89
End: 2024-01-29
Payer: MEDICARE

## 2024-01-29 VITALS
OXYGEN SATURATION: 95 % | WEIGHT: 140.21 LBS | HEART RATE: 72 BPM | DIASTOLIC BLOOD PRESSURE: 82 MMHG | SYSTOLIC BLOOD PRESSURE: 168 MMHG | BODY MASS INDEX: 24.84 KG/M2 | RESPIRATION RATE: 22 BRPM | TEMPERATURE: 97.9 F | HEIGHT: 63 IN

## 2024-01-29 DIAGNOSIS — R06.02 SOB (SHORTNESS OF BREATH): Primary | ICD-10-CM

## 2024-01-29 DIAGNOSIS — J44.9 CHRONIC OBSTRUCTIVE PULMONARY DISEASE, UNSPECIFIED COPD TYPE (HCC): ICD-10-CM

## 2024-01-29 LAB
2HR DELTA HS TROPONIN: 1 NG/L
ALBUMIN SERPL BCP-MCNC: 4 G/DL (ref 3.5–5)
ALP SERPL-CCNC: 73 U/L (ref 34–104)
ALT SERPL W P-5'-P-CCNC: 11 U/L (ref 7–52)
AST SERPL W P-5'-P-CCNC: 19 U/L (ref 13–39)
BASE EX.OXY STD BLDV CALC-SCNC: 60.8 % (ref 60–80)
BASE EXCESS BLDV CALC-SCNC: 13.5 MMOL/L
BASOPHILS # BLD AUTO: 0.03 THOUSANDS/ÂΜL (ref 0–0.1)
BASOPHILS NFR BLD AUTO: 1 % (ref 0–1)
BILIRUB SERPL-MCNC: 0.42 MG/DL (ref 0.2–1)
BUN SERPL-MCNC: 18 MG/DL (ref 5–25)
CALCIUM SERPL-MCNC: 10.1 MG/DL (ref 8.4–10.2)
CARDIAC TROPONIN I PNL SERPL HS: 12 NG/L
CARDIAC TROPONIN I PNL SERPL HS: 13 NG/L
CHLORIDE SERPL-SCNC: 96 MMOL/L (ref 96–108)
CO2 SERPL-SCNC: >45 MMOL/L (ref 21–32)
CREAT SERPL-MCNC: 0.85 MG/DL (ref 0.6–1.3)
EOSINOPHIL # BLD AUTO: 0.91 THOUSAND/ÂΜL (ref 0–0.61)
EOSINOPHIL NFR BLD AUTO: 15 % (ref 0–6)
ERYTHROCYTE [DISTWIDTH] IN BLOOD BY AUTOMATED COUNT: 13 % (ref 11.6–15.1)
FLUAV RNA RESP QL NAA+PROBE: NEGATIVE
FLUBV RNA RESP QL NAA+PROBE: NEGATIVE
GFR SERPL CREATININE-BSD FRML MDRD: 78 ML/MIN/1.73SQ M
GLUCOSE SERPL-MCNC: 113 MG/DL (ref 65–140)
HCO3 BLDV-SCNC: 40.9 MMOL/L (ref 24–30)
HCT VFR BLD AUTO: 39.3 % (ref 36.5–49.3)
HGB BLD-MCNC: 11.6 G/DL (ref 12–17)
IMM GRANULOCYTES # BLD AUTO: 0.02 THOUSAND/UL (ref 0–0.2)
IMM GRANULOCYTES NFR BLD AUTO: 0 % (ref 0–2)
LYMPHOCYTES # BLD AUTO: 1.37 THOUSANDS/ÂΜL (ref 0.6–4.47)
LYMPHOCYTES NFR BLD AUTO: 23 % (ref 14–44)
MCH RBC QN AUTO: 31.9 PG (ref 26.8–34.3)
MCHC RBC AUTO-ENTMCNC: 29.5 G/DL (ref 31.4–37.4)
MCV RBC AUTO: 108 FL (ref 82–98)
MONOCYTES # BLD AUTO: 0.62 THOUSAND/ÂΜL (ref 0.17–1.22)
MONOCYTES NFR BLD AUTO: 10 % (ref 4–12)
NEUTROPHILS # BLD AUTO: 3.05 THOUSANDS/ÂΜL (ref 1.85–7.62)
NEUTS SEG NFR BLD AUTO: 51 % (ref 43–75)
NRBC BLD AUTO-RTO: 0 /100 WBCS
O2 CT BLDV-SCNC: 10.4 ML/DL
PCO2 BLDV: 66.6 MM HG (ref 42–50)
PH BLDV: 7.41 [PH] (ref 7.3–7.4)
PLATELET # BLD AUTO: 168 THOUSANDS/UL (ref 149–390)
PMV BLD AUTO: 9.2 FL (ref 8.9–12.7)
PO2 BLDV: 31.4 MM HG (ref 35–45)
POTASSIUM SERPL-SCNC: 5 MMOL/L (ref 3.5–5.3)
PROT SERPL-MCNC: 7.4 G/DL (ref 6.4–8.4)
RBC # BLD AUTO: 3.64 MILLION/UL (ref 3.88–5.62)
RSV RNA RESP QL NAA+PROBE: NEGATIVE
SARS-COV-2 RNA RESP QL NAA+PROBE: NEGATIVE
SODIUM SERPL-SCNC: 147 MMOL/L (ref 135–147)
WBC # BLD AUTO: 6 THOUSAND/UL (ref 4.31–10.16)

## 2024-01-29 PROCEDURE — 99285 EMERGENCY DEPT VISIT HI MDM: CPT | Performed by: EMERGENCY MEDICINE

## 2024-01-29 PROCEDURE — 94760 N-INVAS EAR/PLS OXIMETRY 1: CPT

## 2024-01-29 PROCEDURE — 94640 AIRWAY INHALATION TREATMENT: CPT

## 2024-01-29 PROCEDURE — 85025 COMPLETE CBC W/AUTO DIFF WBC: CPT

## 2024-01-29 PROCEDURE — 71275 CT ANGIOGRAPHY CHEST: CPT

## 2024-01-29 PROCEDURE — 80053 COMPREHEN METABOLIC PANEL: CPT

## 2024-01-29 PROCEDURE — 84484 ASSAY OF TROPONIN QUANT: CPT

## 2024-01-29 PROCEDURE — 36415 COLL VENOUS BLD VENIPUNCTURE: CPT

## 2024-01-29 PROCEDURE — 94644 CONT INHLJ TX 1ST HOUR: CPT

## 2024-01-29 PROCEDURE — 93971 EXTREMITY STUDY: CPT | Performed by: SURGERY

## 2024-01-29 PROCEDURE — 99285 EMERGENCY DEPT VISIT HI MDM: CPT

## 2024-01-29 PROCEDURE — 93971 EXTREMITY STUDY: CPT

## 2024-01-29 PROCEDURE — G1004 CDSM NDSC: HCPCS

## 2024-01-29 PROCEDURE — 82805 BLOOD GASES W/O2 SATURATION: CPT

## 2024-01-29 PROCEDURE — 0241U HB NFCT DS VIR RESP RNA 4 TRGT: CPT

## 2024-01-29 PROCEDURE — 93005 ELECTROCARDIOGRAM TRACING: CPT

## 2024-01-29 RX ORDER — SODIUM CHLORIDE FOR INHALATION 0.9 %
12 VIAL, NEBULIZER (ML) INHALATION ONCE
Status: COMPLETED | OUTPATIENT
Start: 2024-01-29 | End: 2024-01-29

## 2024-01-29 RX ORDER — ALBUTEROL SULFATE 2.5 MG/3ML
2.5 SOLUTION RESPIRATORY (INHALATION) EVERY 6 HOURS PRN
Qty: 75 ML | Refills: 0 | Status: SHIPPED | OUTPATIENT
Start: 2024-01-29 | End: 2024-01-31 | Stop reason: SDUPTHER

## 2024-01-29 RX ADMIN — IPRATROPIUM BROMIDE 1 MG: 0.5 SOLUTION RESPIRATORY (INHALATION) at 18:02

## 2024-01-29 RX ADMIN — Medication 12 ML: at 18:02

## 2024-01-29 RX ADMIN — ALBUTEROL SULFATE 10 MG: 2.5 SOLUTION RESPIRATORY (INHALATION) at 18:02

## 2024-01-29 RX ADMIN — IOHEXOL 100 ML: 350 INJECTION, SOLUTION INTRAVENOUS at 19:58

## 2024-01-29 NOTE — ED ATTENDING ATTESTATION
1/29/2024  I, Yariel Patton MD, saw and evaluated the patient. I have discussed the patient with the resident/non-physician practitioner and agree with the resident's/non-physician practitioner's findings, Plan of Care, and MDM as documented in the resident's/non-physician practitioner's note, except where noted. All available labs and Radiology studies were reviewed.  I was present for key portions of any procedure(s) performed by the resident/non-physician practitioner and I was immediately available to provide assistance.       At this point I agree with the current assessment done in the Emergency Department.  I have conducted an independent evaluation of this patient a history and physical is as follows:  Patient is an 88-year-old male.  He has COPD.  He is oxygen dependent.  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  He was coughing today and felt something pull in his neck.  He is also short of breath, this might be chronic.  He does report some chest pain.  Physical exam: No respiratory distress.  Diminished breath sounds but clear.  Regular rate and rhythm without murmur.  Left leg is larger than the right, as per patient this is chronic.  ED Course         Critical Care Time  Procedures

## 2024-01-29 NOTE — ED PROVIDER NOTES
"History  Chief Complaint   Patient presents with    Medical Problem     Pt reports \"something rupture in my neck\", pt c/o SOB but reports \"I'm always SOB\"     Patient is an 88-year-old male with past medical history significant for COPD on 3 to 4 L nasal cannula, history of DVT not currently on anticoagulation presenting to the emergency department for evaluation of cough as well as shortness of breath.  Patient notes he has been taking all patient's at home.  He is Bulgarian-speaking only.  Patient notes when he coughed that his right side of his neck hurt.  Patient notes that the pain is currently gone.  He denies any headaches dizziness tinnitus vision change numbness tingling in any of his extremities he denies any chest pain nausea vomiting diarrhea constipation dysuria hematuria.  Patient is unsure if he has any sick contacts.        Prior to Admission Medications   Prescriptions Last Dose Informant Patient Reported? Taking?   QUEtiapine (SEROquel) 25 mg tablet Not Taking  No No   Sig: Take 1 tablet (25 mg total) by mouth daily at bedtime   Patient not taking: Reported on 1/29/2024   Trelegy Ellipta 200-62.5-25 MCG/ACT AEPB inhaler   Yes Yes   Sig: INHALE 1 PUFF DAILY RINSE MOUTH AFTER USE.   albuterol (2.5 mg/3 mL) 0.083 % nebulizer solution   No Yes   Sig: TAKE 3 ML (2.5 MG TOTAL) BY NEBULIZATION EVERY 6 (SIX) HOURS AS NEEDED FOR WHEEZING OR SHORTNESS OF BREATH   albuterol (PROVENTIL HFA,VENTOLIN HFA) 90 mcg/act inhaler   No Yes   Sig: INHALE 2 PUFFS EVERY 6 (SIX) HOURS AS NEEDED FOR WHEEZING   brimonidine tartrate 0.2 % ophthalmic solution   Yes Yes   Sig: INSTILL 1 DROP IN LEFT EYE TWICE DAILY INSTILL 1 DROP IN LEFT EYE DOS VECES AL EULALIO   budesonide (Pulmicort) 0.5 mg/2 mL nebulizer solution   No No   Sig: Take 2 mL (0.5 mg total) by nebulization 2 (two) times a day Rinse mouth after use.   formoterol (PERFOROMIST) 20 MCG/2ML nebulizer solution   No Yes   Sig: Take 2 mL (20 mcg total) by nebulization 2 (two) " times a day   ipratropium-albuterol (DUO-NEB) 0.5-2.5 mg/3 mL nebulizer solution   Yes Yes   Sig: TAKE 3 ML BY NEBULIZATION 3 (THREE) TIMES A DAY   losartan (COZAAR) 25 mg tablet   No Yes   Sig: Take 2 tablets (50 mg total) by mouth 2 (two) times a day   pantoprazole (PROTONIX) 40 mg tablet   No Yes   Sig: TAKE 1 TABLET (40 MG TOTAL) BY MOUTH DAILY   polyethylene glycol (GLYCOLAX) 17 GM/SCOOP powder   Yes Yes   predniSONE 10 mg tablet   No Yes   Si for 4 days, 3 for 4 days, 2 for 4 days, 1 for 4 days      Facility-Administered Medications: None       Past Medical History:   Diagnosis Date    Acute metabolic encephalopathy 2020    Age-related cataract of right eye 2023    patient is medically cleared and presents with low risk of complication with this upcoming R cataract surgery.    Anemia     Aneurysm, aorta, thoracic (HCC)     Appendicolith     Ascending aortic aneurysm (HCC)     3.7    Asthma     BPH (benign prostatic hyperplasia)     CAD (coronary artery disease)     noted on CT scan    CHF (congestive heart failure) (HCC)     COPD (chronic obstructive pulmonary disease) (HCC)     Descending thoracic aortic aneurysm (HCC)     Diabetes mellitus (HCC)     Diverticulosis     Former tobacco use     GERD (gastroesophageal reflux disease)     History of DVT (deep vein thrombosis)     Left leg    History of transfusion     Hypertension     Hypoxemia 2023    Inguinal hernia     right    Nephrolithiasis     Oxygen dependent     2LNC    Oxygen dependent     Pneumonia     Pre-diabetes     Prostate calculus     PVD (peripheral vascular disease) (HCC)     Recurrent right inguinal hernia w incarcertion 2023    Thoracic aortic aneurysm without rupture ([facility]) 2018    Added automatically from request for surgery [id number]    Thrombocytopenia ([facility]) 2018    Ulcer     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[facility])     Varicose vein of leg     b/l       Past Surgical History:    Procedure Laterality Date    CARDIAC SURGERY      ESOPHAGOGASTRODUODENOSCOPY      HERNIA REPAIR Right 2019    Procedure: REPAIR HERNIA INGUINAL WITH MESH;  Surgeon: David Lowry MD;  Location:  MAIN OR;  Service: General    HERNIA REPAIR Right 2023    Procedure: REPAIR RECURRENT INCARERATED HERNIA INGUINAL OPEN, RIGHT ORCHIECTOMY;  Surgeon: Mason Bertrand MD;  Location: [facility];  Service: General    INGUINAL HERNIA REPAIR Bilateral     IR TEVAR  2018    ME EVASC RPR DTA COVERAGE ART ORIGIN 1ST ENDOPROSTH N/A 2018    Procedure: TEVAR - endovascular thoracic aortic aneurysm repair;  Surgeon: Gladis Ortega MD;  Location: [facility];  Service: Vascular    THORACIC AORTIC ANEURYSM REPAIR  2018    VARICOSE VEIN SURGERY Bilateral     vein stripping       Family History   Problem Relation Age of Onset    Tuberculosis Mother     No Known Problems Father     Cancer Sister     Diabetes Family     Hypertension Family      I have reviewed and agree with the history as documented.    E-Cigarette/Vaping    E-Cigarette Use Never User      E-Cigarette/Vaping Substances     Social History     Tobacco Use    Smoking status: Former     Current packs/day: 0.00     Average packs/day: 1 pack/day for 35.0 years (35.0 ttl pk-yrs)     Types: Cigarettes     Start date:      Quit date:      Years since quittin.0    Smokeless tobacco: Never   Vaping Use    Vaping status: Never Used   Substance Use Topics    Alcohol use: Never    Drug use: No        Review of Systems   Constitutional:  Positive for activity change. Negative for chills and fever.   HENT:  Negative for congestion, ear pain and sore throat.    Eyes:  Negative for pain and visual disturbance.   Respiratory:  Positive for cough and shortness of breath.    Cardiovascular:  Negative for chest pain and palpitations.   Gastrointestinal:  Negative for abdominal pain, constipation, diarrhea, nausea and vomiting.   Genitourinary:  Negative  for dysuria and hematuria.   Musculoskeletal:  Negative for arthralgias and back pain.   Skin:  Negative for color change and rash.   Neurological:  Negative for seizures and syncope.   All other systems reviewed and are negative.      Physical Exam  ED Triage Vitals   Temperature Pulse Respirations Blood Pressure SpO2   01/29/24 1748 01/29/24 1738 01/29/24 1738 01/29/24 1738 01/29/24 1738   97.9 °F (36.6 °C) 71 20 163/80 96 %      Temp Source Heart Rate Source Patient Position - Orthostatic VS BP Location FiO2 (%)   01/29/24 1748 01/29/24 1738 01/29/24 1738 01/29/24 1738 --   Oral Monitor Sitting Right arm       Pain Score       01/29/24 1924       No Pain             Orthostatic Vital Signs  Vitals:    01/29/24 1738 01/29/24 1924 01/29/24 2100   BP: 163/80 135/62 168/82   Pulse: 71 74 72   Patient Position - Orthostatic VS: Sitting Sitting Lying       Physical Exam  Vitals and nursing note reviewed.   Constitutional:       General: He is not in acute distress.     Appearance: He is well-developed.   HENT:      Head: Normocephalic and atraumatic.      Right Ear: External ear normal.      Left Ear: External ear normal.      Nose: Nose normal.      Mouth/Throat:      Mouth: Mucous membranes are dry.   Eyes:      Extraocular Movements: Extraocular movements intact.      Conjunctiva/sclera: Conjunctivae normal.   Cardiovascular:      Rate and Rhythm: Normal rate and regular rhythm.      Heart sounds: No murmur heard.  Pulmonary:      Effort: Pulmonary effort is normal. No respiratory distress.      Breath sounds: Normal breath sounds.      Comments: 4 L nasal cannula  Abdominal:      Palpations: Abdomen is soft.      Tenderness: There is no abdominal tenderness.   Musculoskeletal:         General: No swelling.      Cervical back: Normal range of motion and neck supple.      Right lower leg: No edema.      Left lower leg: Edema present.   Skin:     General: Skin is warm and dry.      Capillary Refill: Capillary refill  takes less than 2 seconds.   Neurological:      General: No focal deficit present.      Mental Status: He is alert. Mental status is at baseline.   Psychiatric:         Mood and Affect: Mood normal.         ED Medications  Medications   albuterol inhalation solution 10 mg (10 mg Nebulization Given 1/29/24 1802)   ipratropium (ATROVENT) 0.02 % inhalation solution 1 mg (1 mg Nebulization Given 1/29/24 1802)   sodium chloride 0.9 % inhalation solution 12 mL (12 mL Nebulization Given 1/29/24 1802)   iohexol (OMNIPAQUE) 350 MG/ML injection (MULTI-DOSE) 100 mL (100 mL Intravenous Given 1/29/24 1958)       Diagnostic Studies  Results Reviewed       Procedure Component Value Units Date/Time    HS Troponin I 2hr [[id number]]  (Normal) Collected: 01/29/24 2006    Lab Status: Final result Specimen: Blood from Arm, Right Updated: 01/29/24 2041     hs TnI 2hr 13 ng/L      Delta 2hr hsTnI 1 ng/L     Blood gas, venous [[id number]]  (Abnormal) Collected: 01/29/24 1922    Lab Status: Final result Specimen: Blood from Arm, Left Updated: 01/29/24 1930     pH, Nabeel 7.406     pCO2, Nabeel 66.6 mm Hg      pO2, Nabeel 31.4 mm Hg      HCO3, Nabeel 40.9 mmol/L      Base Excess, Nabeel 13.5 mmol/L      O2 Content, Nabeel 10.4 ml/dL      O2 HGB, VENOUS 60.8 %     FLU/RSV/COVID - if FLU/RSV clinically relevant [[id number]]  (Normal) Collected: 01/29/24 1758    Lab Status: Final result Specimen: Nares from Nose Updated: 01/29/24 1909     SARS-CoV-2 Negative     INFLUENZA A PCR Negative     INFLUENZA B PCR Negative     RSV PCR Negative    Narrative:      FOR PEDIATRIC PATIENTS - copy/paste COVID Guidelines URL to browser: [email or web address]    SARS-CoV-2 assay is a Nucleic Acid Amplification assay intended for the  qualitative detection of nucleic acid from SARS-CoV-2 in nasopharyngeal  swabs. Results are for the presumptive identification of SARS-CoV-2 RNA.    Positive results are indicative of infection  with SARS-CoV-2, the virus  causing COVID-19, but do not rule out bacterial infection or co-infection  with other viruses. Laboratories within the [place] and its  territories are required to report all positive results to the appropriate  public health authorities. Negative results do not preclude SARS-CoV-2  infection and should not be used as the sole basis for treatment or other  patient management decisions. Negative results must be combined with  clinical observations, patient history, and epidemiological information.  This test has not been FDA cleared or approved.    This test has been authorized by FDA under an Emergency Use Authorization  (EUA). This test is only authorized for the duration of time the  declaration that circumstances exist justifying the authorization of the  emergency use of an in vitro diagnostic tests for detection of SARS-CoV-2  virus and/or diagnosis of COVID-19 infection under section 564(b)(1) of  the Act, 21 U.S.C. 360bbb-3(b)(1), unless the authorization is terminated  or revoked sooner. The test has been validated but independent review by FDA  and CLIA is pending.    Test performed using [employer] GeneXpert: This RT-PCR assay targets N2,  a region unique to SARS-CoV-2. A conserved region in the E-gene was chosen  for pan-Sarbecovirus detection which includes SARS-CoV-2.    According to CMS-2020-01-R, this platform meets the definition of high-throughput technology.    Comprehensive metabolic panel [[id number]]  (Abnormal) Collected: 01/29/24 4795    Lab Status: Final result Specimen: Blood from Arm, Left Updated: 01/29/24 0613     Sodium 147 mmol/L      Potassium 5.0 mmol/L      Chloride 96 mmol/L      CO2 >45 mmol/L      ANION GAP --     BUN 18 mg/dL      Creatinine 0.85 mg/dL      Glucose 113 mg/dL      Calcium 10.1 mg/dL      AST 19 U/L      ALT 11 U/L      Alkaline Phosphatase 73 U/L      Total Protein 7.4 g/dL      Albumin 4.0 g/dL      Total Bilirubin 0.42 mg/dL       eGFR 78 ml/min/1.73sq m     Narrative:      National Kidney Disease Foundation guidelines for Chronic Kidney Disease (CKD):     Stage 1 with normal or high GFR (GFR > 90 mL/min/1.73 square meters)    Stage 2 Mild CKD (GFR = 60-89 mL/min/1.73 square meters)    Stage 3A Moderate CKD (GFR = 45-59 mL/min/1.73 square meters)    Stage 3B Moderate CKD (GFR = 30-44 mL/min/1.73 square meters)    Stage 4 Severe CKD (GFR = 15-29 mL/min/1.73 square meters)    Stage 5 End Stage CKD (GFR <15 mL/min/1.73 square meters)  Note: GFR calculation is accurate only with a steady state creatinine    HS Troponin 0hr (reflex protocol) [[id number]]  (Normal) Collected: 01/29/24 1756    Lab Status: Final result Specimen: Blood from Arm, Left Updated: 01/29/24 1852     hs TnI 0hr 12 ng/L     CBC and differential [[id number]]  (Abnormal) Collected: 01/29/24 1756    Lab Status: Final result Specimen: Blood from Arm, Left Updated: 01/29/24 1829     WBC 6.00 Thousand/uL      RBC 3.64 Million/uL      Hemoglobin 11.6 g/dL      Hematocrit 39.3 %       fL      MCH 31.9 pg      MCHC 29.5 g/dL      RDW 13.0 %      MPV 9.2 fL      Platelets 168 Thousands/uL      nRBC 0 /100 WBCs      Neutrophils Relative 51 %      Immat GRANS % 0 %      Lymphocytes Relative 23 %      Monocytes Relative 10 %      Eosinophils Relative 15 %      Basophils Relative 1 %      Neutrophils Absolute 3.05 Thousands/µL      Immature Grans Absolute 0.02 Thousand/uL      Lymphocytes Absolute 1.37 Thousands/µL      Monocytes Absolute 0.62 Thousand/µL      Eosinophils Absolute 0.91 Thousand/µL      Basophils Absolute 0.03 Thousands/µL                    CTA ED chest PE Study   ED Interpretation by Annette Maria Palladino, DO (01/29 2009)   No pe noted      Final Result by Diego Peters DO (01/29 2114)   No central or lobar pulmonary embolus. Limited evaluation of the segmental and subsegmental vessels.                  Workstation performed: [id number]         VAS lower limb  venous duplex study, unilateral/limited    (Results Pending)         Procedures  ECG 12 Lead Documentation Only    Date/Time: 1/29/2024 7:57 PM    Performed by: Annette Maria Palladino, DO  Authorized by: Annette Maria Palladino, DO    Indications / Diagnosis:  Sob  ECG reviewed by me, the ED Provider: yes    Patient location:  ED  Previous ECG:     Previous ECG:  Compared to current  Interpretation:     Interpretation: abnormal    Rate:     ECG rate:  76    ECG rate assessment: normal    Rhythm:     Rhythm: sinus rhythm    Ectopy:     Ectopy: none    QRS:     QRS axis:  Normal    QRS intervals:  Normal  Conduction:     Conduction: normal    ST segments:     ST segments:  Non-specific        ED Course  ED Course as of 01/29/24 2127 Mon Jan 29, 2024   1741 Blood Pressure: 163/80   1741 Pulse: 71   1741 Respirations: 20   1741 SpO2: 96 %   1750 - Given patient's concerns, will do a cardiac workup.   - COVID FLU RSV  - HEART NEB for aspect of possible COPD exacerbation.  - Will do an EKG for arrythmia, strain; troponin for same as per protocol for evaluation of ACS.   - CBC for anemia; CMP for kidney function and electrolytes.   - Will check CTA PE scan given that he has swollen left leg  HEART score:  - Disposition per workup.      1831 WBC: 6.00   1831 Hemoglobin(!): 11.6   1859 Carbon Dioxide(!!): >45   1859 DVT Scan negative    1921 SARS-COV-2: Negative   1921 INFLU A PCR: Negative   1921 INFLU B PCR: Negative   1921 RSV PCR: Negative   1931 pCO2, Nabeel(!): 66.6   2058 Called for offical read.   2119 No central or lobar pulmonary embolus. Limited evaluation of the segmental and subsegmental vessels.   2119 Pt re-examined and evaluated after testing and treatment. Spoke with the patient and wife and feeling improved. Will discharge home with close f/u with pcp and instructed to return to the ED if sxs worsen or continue. Pt agrees with the plan for discharge and feels comfortable to go home with proper f/u. Advised  to return for worsening or additional problems. Diagnostic tests were reviewed and questions answered. Diagnosis, care plan and treatment options were discussed. The patient understand instructions and will follow up as directed.    Counseling:there was a  detailed discussion with the patient and/or guardian regarding: the historical points, exam findings, and any diagnostic results supporting the discharge diagnosis, lab results, radiology results, discharge instructions reviewed with patient and/or family/caregiver and understanding was verbalized. Instructions given to return to the emergency department if symptoms worsen or persist, or if there are any questions or concerns that arise at home.     All labs reviewed and utilized in the medical decision making process    All radiology studies independently viewed by me and interpreted by the radiologist.                 HEART Risk Score      Flowsheet Row Most Recent Value   Heart Score Risk Calculator    History 0 Filed at: 01/29/2024 1958   ECG 1 Filed at: 01/29/2024 1958   Age 2 Filed at: 01/29/2024 1958   Risk Factors 2 Filed at: 01/29/2024 1958   Troponin 1 Filed at: 01/29/2024 1958   HEART Score 6 Filed at: 01/29/2024 1958                        SBIRT 22yo+      Flowsheet Row Most Recent Value   Initial Alcohol Screen: US AUDIT-C     1. How often do you have a drink containing alcohol? 0 Filed at: 01/29/2024 1924   2. How many drinks containing alcohol do you have on a typical day you are drinking?  0 Filed at: 01/29/2024 1924   3b. FEMALE Any Age, or MALE 65+: How often do you have 4 or more drinks on one occassion? 0 Filed at: 01/29/2024 1924   Audit-C Score 0 Filed at: 01/29/2024 1924   ALAN: How many times in the past year have you...    Used an illegal drug or used a prescription medication for non-medical reasons? Never Filed at: 01/29/2024 1924            Wells' Criteria for PE      Flowsheet Row Most Recent Value   Wells' Criteria for PE    Clinical  signs and symptoms of DVT 3 Filed at: 01/29/2024 1811   PE is primary diagnosis or equally likely 0 Filed at: 01/29/2024 1811   HR >100 0 Filed at: 01/29/2024 1811   Immobilization at least 3 days or Surgery in the previous 4 weeks 0 Filed at: 01/29/2024 1811   Previous, objectively diagnosed PE or DVT 1.5 Filed at: 01/29/2024 1811   Hemoptysis 0 Filed at: 01/29/2024 1811   Malignancy with treatment within 6 months or palliative 0 Filed at: 01/29/2024 1811   Wells' Criteria Total 4.5 Filed at: 01/29/2024 1811              Medical Decision Making  Amount and/or Complexity of Data Reviewed  Labs: ordered. Decision-making details documented in ED Course.  Radiology: ordered and independent interpretation performed.    Risk  Prescription drug management.          Disposition  Final diagnoses:   SOB (shortness of breath)     Time reflects when diagnosis was documented in both MDM as applicable and the Disposition within this note       Time User Action Codes Description Comment    1/29/2024  7:57 PM Palladino, Annette Add [R06.02] SOB (shortness of breath)           ED Disposition       ED Disposition   Discharge    Condition   Stable    Date/Time   Mon Jan 29, 2024 2120    Comment   --             Follow-up Information       Follow up With Specialties Details Why Contact Info Additional Information    Kirby Casanova MD Family Medicine Schedule an appointment as soon as possible for a visit  for follow up [address]  [facility] [address]  [phone]       [facility] Emergency Department Emergency Medicine Go to  As needed, If symptoms worsen [address] [place] [id number] [phone] [facility] Emergency Department, [facility] [place], [place], [address]    [facility]  Pulmonology Schedule an appointment as soon as possible for a visit  for follow up [address]  [address]  [facility] [address]  [phone]                Patient's Medications   Discharge Prescriptions    No medications on file     No discharge procedures on file.    PDMP Review         Value Time User    PDMP Reviewed  Yes 1/4/2024  9:05 AM Jennifer Paige Bloch, CRNP             ED Provider  Attending physically available and evaluated Severiano Feliciano. I managed the patient along with the ED Attending.    Electronically Signed by           Annette Maria Palladino, DO  01/29/24 [id number]

## 2024-01-30 LAB
ATRIAL RATE: 76 BPM
ATRIAL RATE: 83 BPM
P AXIS: 64 DEGREES
P AXIS: 76 DEGREES
PR INTERVAL: 158 MS
PR INTERVAL: 162 MS
QRS AXIS: 72 DEGREES
QRS AXIS: 77 DEGREES
QRSD INTERVAL: 78 MS
QRSD INTERVAL: 82 MS
QT INTERVAL: 358 MS
QT INTERVAL: 374 MS
QTC INTERVAL: 402 MS
QTC INTERVAL: 439 MS
T WAVE AXIS: 65 DEGREES
T WAVE AXIS: 65 DEGREES
VENTRICULAR RATE: 76 BPM
VENTRICULAR RATE: 83 BPM

## 2024-01-31 ENCOUNTER — OFFICE VISIT (OUTPATIENT)
Dept: PULMONOLOGY | Facility: CLINIC | Age: 89
End: 2024-01-31
Payer: MEDICARE

## 2024-01-31 VITALS
BODY MASS INDEX: 24.27 KG/M2 | TEMPERATURE: 98 F | OXYGEN SATURATION: 96 % | SYSTOLIC BLOOD PRESSURE: 126 MMHG | WEIGHT: 137 LBS | HEIGHT: 63 IN | HEART RATE: 70 BPM | DIASTOLIC BLOOD PRESSURE: 74 MMHG

## 2024-01-31 DIAGNOSIS — J96.10 CHRONIC RESPIRATORY FAILURE, UNSPECIFIED WHETHER WITH HYPOXIA OR HYPERCAPNIA (HCC): ICD-10-CM

## 2024-01-31 DIAGNOSIS — J44.9 CHRONIC OBSTRUCTIVE PULMONARY DISEASE, UNSPECIFIED COPD TYPE (HCC): Primary | ICD-10-CM

## 2024-01-31 DIAGNOSIS — I50.32 CHRONIC DIASTOLIC (CONGESTIVE) HEART FAILURE (HCC): ICD-10-CM

## 2024-01-31 PROCEDURE — 99214 OFFICE O/P EST MOD 30 MIN: CPT | Performed by: INTERNAL MEDICINE

## 2024-01-31 RX ORDER — ALBUTEROL SULFATE 2.5 MG/3ML
2.5 SOLUTION RESPIRATORY (INHALATION) EVERY 6 HOURS PRN
Qty: 75 ML | Refills: 5 | Status: SHIPPED | OUTPATIENT
Start: 2024-01-31 | End: 2025-01-25

## 2024-01-31 RX ORDER — IPRATROPIUM BROMIDE AND ALBUTEROL SULFATE 2.5; .5 MG/3ML; MG/3ML
3 SOLUTION RESPIRATORY (INHALATION) 2 TIMES DAILY
Qty: 540 ML | Refills: 3 | Status: SHIPPED | OUTPATIENT
Start: 2024-01-31 | End: 2025-01-25

## 2024-01-31 RX ORDER — BUDESONIDE 0.5 MG/2ML
0.5 INHALANT ORAL 2 TIMES DAILY
Qty: 360 ML | Refills: 3 | Status: SHIPPED | OUTPATIENT
Start: 2024-01-31 | End: 2025-01-25

## 2024-01-31 NOTE — PROGRESS NOTES
Pulmonary Outpatient Note   Severiano Feliciano 88 y.o. male MRN: [id number]  2/2/2024        Reason for Consultation:    Chief Complaint   Patient presents with    COPD         Assessment/Plan:    1. Chronic obstructive pulmonary disease, unspecified COPD type (HCC)  Assessment & Plan:  GOLD E  Multiple admissions for exacerbations  Patient is elderly and frail  Discussed role of palliative care referral- this was declined for now    Plan  Continue Budesonide BID and Duonebs TID with PRN albuterol in between as needed  Continue supplemental oxygen and nocturnal NIPPV  Pulm rehab  Reports that he had flu vaccine in fall although I do not have record.  Need PFTs     RTC  3 months    Orders:  -     ipratropium-albuterol (DUO-NEB) 0.5-2.5 mg/3 mL nebulizer solution; Take 3 mL by nebulization 2 (two) times a day  -     budesonide (Pulmicort) 0.5 mg/2 mL nebulizer solution; Take 2 mL (0.5 mg total) by nebulization 2 (two) times a day Rinse mouth after use.  -     albuterol (2.5 mg/3 mL) 0.083 % nebulizer solution; Take 3 mL (2.5 mg total) by nebulization every 6 (six) hours as needed for wheezing or shortness of breath  -     Ambulatory Referral to Pulmonary Rehabilitation; Future    2. Chronic respiratory failure, unspecified whether with hypoxia or hypercapnia (HCC)    3. Chronic diastolic (congestive) heart failure (HCC)          Health Maintenance  Immunization History   Administered Date(s) Administered    COVID-19 PFIZER VACCINE 0.3 ML IM 05/07/2021, 06/01/2021    Pneumococcal Conjugate 13-Valent 08/24/2016    Pneumococcal Polysaccharide PPV23 11/16/2017, 08/02/2018      He reports he got the influenza vaccine this year.    Return in about 3 months (around 4/30/2024).    History of Present Illness   HPI:  Severiano Feliciano is a 88 y.o. male who presents for follow-up for COPD and chronic hypoxemic/hypercapnic respiratory failure.    Was seen in August- inhalers stopped  Started on budesonide and perforomist.  Duonebs TID.  HE required 2 lpm of oxygen at rest nad 4 lpm with exertion.  PFTs not done.    Admitted to hospital in December for COPD exacerbation  Again admitted and discharged in January    He is using BiPAP at home.  Struggling with face mask.  He is getting a nasal mask sent.    [employer] for DME    He has had functional decline  Son is interpreting- they decline language line  I inquired about palliative care- they declined referral      Review of Systems   Constitutional:  Positive for fatigue. Negative for chills and fever.   HENT:  Negative for ear pain and sore throat.    Eyes:  Negative for pain and visual disturbance.   Respiratory:  Positive for shortness of breath. Negative for cough.    Cardiovascular:  Negative for chest pain and palpitations.   Gastrointestinal:  Negative for abdominal pain and vomiting.   Genitourinary:  Negative for dysuria and hematuria.   Musculoskeletal:  Negative for arthralgias and back pain.   Skin:  Negative for color change and rash.   Neurological:  Negative for seizures and syncope.   All other systems reviewed and are negative.          Historical Information   Past Medical History:   Diagnosis Date    Acute metabolic encephalopathy 06/13/2020    Age-related cataract of right eye 04/04/2023    patient is medically cleared and presents with low risk of complication with this upcoming R cataract surgery.    Anemia     Aneurysm, aorta, thoracic (HCC)     Appendicolith     Ascending aortic aneurysm (HCC)     3.7    Asthma     BPH (benign prostatic hyperplasia)     CAD (coronary artery disease)     noted on CT scan    CHF (congestive heart failure) (HCC)     COPD (chronic obstructive pulmonary disease) (HCC)     Descending thoracic aortic aneurysm (HCC)     Diabetes mellitus (HCC)     Diverticulosis     Former tobacco use     GERD (gastroesophageal reflux disease)     History of DVT (deep vein thrombosis)     Left leg    History of transfusion     Hypertension      Hypoxemia 04/30/2023    Inguinal hernia     right    Nephrolithiasis     Oxygen dependent     2LNC    Oxygen dependent     Pneumonia     Pre-diabetes     Prostate calculus     PVD (peripheral vascular disease) ([facility])     Recurrent right inguinal hernia w incarcertion 01/04/2023    Thoracic aortic aneurysm without rupture ([facility]) 11/19/2018    Added automatically from request for surgery [id number]    Thrombocytopenia (HCC) 12/29/2018    Ulcer     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[facility])     Varicose vein of leg     b/l     Past Surgical History:   Procedure Laterality Date    CARDIAC SURGERY      ESOPHAGOGASTRODUODENOSCOPY      HERNIA REPAIR Right 1/21/2019    Procedure: REPAIR HERNIA INGUINAL WITH MESH;  Surgeon: David Lowry MD;  Location:  MAIN OR;  Service: General    HERNIA REPAIR Right 7/22/2023    Procedure: REPAIR RECURRENT INCARERATED HERNIA INGUINAL OPEN, RIGHT ORCHIECTOMY;  Surgeon: Mason Bertrand MD;  Location: [facility];  Service: General    INGUINAL HERNIA REPAIR Bilateral     IR TEVAR  12/27/2018    DE EVASC RPR DTA COVERAGE ART ORIGIN 1ST ENDOPROSTH N/A 12/27/2018    Procedure: TEVAR - endovascular thoracic aortic aneurysm repair;  Surgeon: Gladis Ortega MD;  Location: [facility];  Service: Vascular    THORACIC AORTIC ANEURYSM REPAIR  12/27/2018    VARICOSE VEIN SURGERY Bilateral     vein stripping     Family History   Problem Relation Age of Onset    Tuberculosis Mother     No Known Problems Father     Cancer Sister     Diabetes Family     Hypertension Family        Meds/Allergies     Current Outpatient Medications:     albuterol (2.5 mg/3 mL) 0.083 % nebulizer solution, Take 3 mL (2.5 mg total) by nebulization every 6 (six) hours as needed for wheezing or shortness of breath, Disp: 75 mL, Rfl: 5    albuterol (PROVENTIL HFA,VENTOLIN HFA) 90 mcg/act inhaler, INHALE 2 PUFFS EVERY 6 (SIX) HOURS AS NEEDED FOR WHEEZING, Disp: 18 g, Rfl: 5    brimonidine tartrate 0.2 % ophthalmic solution,  "INSTILL 1 DROP IN LEFT EYE TWICE DAILY INSTILL 1 DROP IN LEFT EYE DIMITRIS RAMIREZ AL EULALIO, Disp: , Rfl:     budesonide (Pulmicort) 0.5 mg/2 mL nebulizer solution, Take 2 mL (0.5 mg total) by nebulization 2 (two) times a day Rinse mouth after use., Disp: 360 mL, Rfl: 3    formoterol (PERFOROMIST) 20 MCG/2ML nebulizer solution, Take 2 mL (20 mcg total) by nebulization 2 (two) times a day, Disp: 120 mL, Rfl: 30    ipratropium-albuterol (DUO-NEB) 0.5-2.5 mg/3 mL nebulizer solution, Take 3 mL by nebulization 2 (two) times a day, Disp: 540 mL, Rfl: 3    losartan (COZAAR) 25 mg tablet, Take 2 tablets (50 mg total) by mouth 2 (two) times a day, Disp: 180 tablet, Rfl: 1    pantoprazole (PROTONIX) 40 mg tablet, TAKE 1 TABLET (40 MG TOTAL) BY MOUTH DAILY, Disp: 30 tablet, Rfl: 5    polyethylene glycol (GLYCOLAX) 17 GM/SCOOP powder, , Disp: , Rfl:     QUEtiapine (SEROquel) 25 mg tablet, Take 1 tablet (25 mg total) by mouth daily at bedtime (Patient not taking: Reported on 1/29/2024), Disp: 30 tablet, Rfl: 0  Allergies   Allergen Reactions    Penicillins Hives, Itching and Rash    Lisinopril Rash     Side pains and rash     Zyprexa [Olanzapine] Tongue Swelling       Vitals: Blood pressure 126/74, pulse 70, temperature 98 °F (36.7 °C), temperature source Tympanic, height 5' 3\" (1.6 m), weight 62.1 kg (137 lb), SpO2 96%. Body mass index is 24.27 kg/m². Oxygen Therapy  SpO2: 96 %  Oxygen Therapy: Supplemental oxygen  O2 Delivery Method: Nasal cannula  O2 Flow Rate (L/min): 4 L/min      Physical Exam  Physical Exam  Vitals and nursing note reviewed.   Constitutional:       General: He is not in acute distress.     Appearance: He is well-developed.      Comments: Elderly, chronically ill appearing   HENT:      Head: Normocephalic and atraumatic.   Eyes:      Conjunctiva/sclera: Conjunctivae normal.   Cardiovascular:      Rate and Rhythm: Normal rate and regular rhythm.      Heart sounds: No murmur heard.  Pulmonary:      Effort: Pulmonary " "effort is normal. No respiratory distress.      Comments: Distant breath sounds  Abdominal:      Palpations: Abdomen is soft.      Tenderness: There is no abdominal tenderness.   Musculoskeletal:         General: No swelling.      Cervical back: Neck supple.   Skin:     General: Skin is warm and dry.      Capillary Refill: Capillary refill takes less than 2 seconds.   Neurological:      Mental Status: He is alert.   Psychiatric:         Mood and Affect: Mood normal.         Labs:   I have personally reviewed pertinent lab results.    ABG:   Lab Results   Component Value Date    PHART 7.420 01/06/2024    SMA2JTN 68.1 (HH) 01/06/2024    PO2ART 93.2 01/06/2024    WJI9EXN 43.2 (H) 01/06/2024    BEART 15.6 01/06/2024    SOURCE Radial, Left 01/06/2024   ,   BNP:   Lab Results   Component Value Date    BNP 48 01/01/2024   ,   CBC:  Lab Results   Component Value Date    WBC 6.00 01/29/2024    HGB 11.6 (L) 01/29/2024    HCT 39.3 01/29/2024     (H) 01/29/2024     01/29/2024    EOSPCT 15 (H) 01/29/2024    EOSABS 0.91 (H) 01/29/2024    NEUTOPHILPCT 51 01/29/2024    LYMPHOPCT 23 01/29/2024   ,   CMP:   Lab Results   Component Value Date    SODIUM 147 01/29/2024    K 5.0 01/29/2024    CL 96 01/29/2024    CO2 >45 (HH) 01/29/2024    ANIONGAP 0 (L) 03/01/2014    BUN 18 01/29/2024    CREATININE 0.85 01/29/2024    GLUCOSE 136 12/27/2018    CALCIUM 10.1 01/29/2024    AST 19 01/29/2024    ALT 11 01/29/2024    ALKPHOS 73 01/29/2024    EGFR 78 01/29/2024   ,   PT/INR:   Lab Results   Component Value Date    INR 0.99 12/05/2023   ,   Troponin:   Lab Results   Component Value Date    TROPONINI 0.02 08/05/2021         Imaging and other studies: I have personally reviewed pertinent reports.   and I have personally reviewed pertinent films in PACS    CTA Chest 1/29  Upper lobe predominant emphysema  No PE    Pulmonary function testing:   Pulmonary Functions Testing Results:    No results found for: \"FEV1\", \"FVC\", \"JHV2TDO\", " "\"TLC\", \"DLCO\"        EKG, Pathology, and Other Studies: I have personally reviewed pertinent reports.     TTE 4/2023    Left Ventricle: Left ventricular cavity size is normal. Wall thickness is moderately increased. Systolic function is normal. Wall motion is normal. Diastolic function is mildly abnormal, consistent with grade I (abnormal) relaxation.    Tricuspid Valve: There is mild regurgitation. The right ventricular systolic pressure is mildly elevated. The estimated right ventricular systolic pressure is 39.00 mmHg.    Buddy Hayward M.D.  [facility]  "

## 2024-01-31 NOTE — PATIENT INSTRUCTIONS
Use Albuterol-Ipratropium and Budesonide (also called pulmicort)- mix together- use twice a day  Rinse mouth out afterwards    Uses albuterol-sulfate alone neb when needed    Go to pulmonary rehab    Get Pulmonary function test    Get RSV vaccine

## 2024-02-02 NOTE — ASSESSMENT & PLAN NOTE
AUGUSTO GUILLEN  Multiple admissions for exacerbations  Patient is elderly and frail  Discussed role of palliative care referral- this was declined for now    Plan  Continue Budesonide BID and Duonebs TID with PRN albuterol in between as needed  Continue supplemental oxygen and nocturnal NIPPV  Pulm rehab  Reports that he had flu vaccine in fall although I do not have record.  Need PFTs     RTC  3 months

## 2024-02-12 DIAGNOSIS — J44.1 CHRONIC OBSTRUCTIVE PULMONARY DISEASE WITH ACUTE EXACERBATION (HCC): ICD-10-CM

## 2024-02-13 ENCOUNTER — TELEMEDICINE (OUTPATIENT)
Dept: NEUROSURGERY | Facility: CLINIC | Age: 89
End: 2024-02-13
Payer: MEDICARE

## 2024-02-13 DIAGNOSIS — Z01.812 PRE-PROCEDURE LAB EXAM: Primary | ICD-10-CM

## 2024-02-13 DIAGNOSIS — I67.1 ANEURYSM, CAROTID ARTERY, INTERNAL: ICD-10-CM

## 2024-02-13 PROCEDURE — G2012 BRIEF CHECK IN BY MD/QHP: HCPCS | Performed by: NURSE PRACTITIONER

## 2024-02-13 RX ORDER — ALBUTEROL SULFATE 90 UG/1
2 AEROSOL, METERED RESPIRATORY (INHALATION) EVERY 6 HOURS PRN
Qty: 18 G | Refills: 5 | Status: SHIPPED | OUTPATIENT
Start: 2024-02-13 | End: 2024-02-19 | Stop reason: ALTCHOICE

## 2024-02-13 NOTE — PROGRESS NOTES
Virtual Regular Visit    Verification of patient location:    Patient is located at Home in the following state in which I hold an active license [place]      Assessment/Plan:    Problem List Items Addressed This Visit          Cardiovascular and Mediastinum    Aneurysm, carotid artery, internal     Notes    Identified risk factors for aneurysm growth and rupture   HTN --HO treating with medication  HLD  --Son reports patient has an elevated cholesterol and is on medication , he is unsure of the name. Record review Lipid profile -WNL.  Tobacco dependence --stopped smoking about 20 years ago , Then was diagnosed w/ COPD.   Need for PCP over site of co- morbid risk conditions ever 3-6 months or as otherwise specified --sone verified has regular visits with PCP.   Identify if you have 2 or more first degree relatives diagnosed with known brain aneurysms or have  suddenly of an unknown cause, grandson reports he found his grandfather, his father's father, when he was a young boy  in the bed with blood coming out of his nose.  He is unsure if it was an aneurysm.  For this reason I explained to the son since his father was diagnosed with an aneurysm not only intracranial but also underwent surgery for another aneurysm and the information he is provided about his grandfather he should follow-up with other first-degree relatives of the need to notify their physicians of this information and receive orders for brain imaging to assess if they have an aneurysm.      Imagining   2024 --OTHER FINDINGS: 1.3 cm partially calcified fusiform aneurysm in right ICA supraclinoid segment (4:34), unchanged. Mild ectasia of left ICA supraclinoid segment, unchanged. Arterial calcifications of carotid siphons and left intradural vertebral artery.    Plan     Discussed imagining result  with patient   Explained as per research in a patient over age 75 no surgical intervention or ongoing imagining surveillance.indicated but son  "wishes to have formal imaging of the brain to validate findings seen on CT imaging.  Extensively discussed natural nature of aneurysms.    Discussed risk of rupture is less than 1%/year per UCAS and <1%/5yrs per ISIUA.    Discussed modifiable risk factors including hypertension, hyperlipidemia, and smoking.    Discussed role of family history as documented above.    Discussed signs and symptoms of aneurysm rupture including severe, sudden onset headache, neck pain, nausea and vomiting, and seizure.  Reiterated that the symptoms should prompt patient to visit in emergency department immediately.    Explained If you experience ANY of the following, call 911 or our office IMMEDIATELY:  SUDDEN severe headache (\"worst headache of my life\" WHOL)  Seizures  Nausea or vomiting  Vision or speech disturbances  Neck pain   Patient will follow-up prn or if symptoms worsen.      Ordered CTA of the head with and without contrast.  Ordered labs secondary to contrast.  Checkout will schedule follow-up appointment within 3 days to 1 week of completion of imaging as a joint with an endovascular surgeon and I.  Advised if he/she has additional questions or concerns to please contact the neurosurgery office.   Advised of aneurysm teaching information documented in the after visit summary.         Relevant Orders    CTA head w wo contrast     Other Visit Diagnoses       Pre-procedure lab exam    -  Primary    Relevant Orders    BUN/Creatinine Ratio                 Reason for visit is   Chief Complaint   Patient presents with    Virtual Regular Visit          Encounter provider RAQUEL Tse    Provider located at [facility] NEUR[facility] [address] [address] [phone]      Recent Visits  No visits were found meeting these conditions.  Showing recent visits within past 7 days and meeting all other requirements  Today's Visits  Date Type Provider Dept   02/13/24 " Telemedicine RAQUEL Tse [facility]   Showing today's visits and meeting all other requirements  Future Appointments  No visits were found meeting these conditions.  Showing future appointments within next 150 days and meeting all other requirements       The patient was identified by name and date of birth. Severiano Feliciano was informed that this is a telemedicine visit and that the visit is being conducted through Telephone.  My office door was closed. No one else was in the room.  He acknowledged consent and understanding of privacy and security of the video platform. The patient has agreed to participate and understands they can discontinue the visit at any time.    Patient/Son (Daljit)  is aware this is a billable service.     Subjective    Patient and wife are Vietnamese-speaking only both gave permission for son Daljit to primarily participate in the visit.  When I telephone the home the wife indicated that the son just left to return to his home and that I needed to call and discussed everything with the son since he spoke English.  Telephoned waldo Bocanegra at [phone].    Severiano Feliciano is a 88 y.o. male  PM/SH: COPD, chronic respiratory failure with hypoxia, oxygen dependent respiratory failure, HTN, chronic diastolic heart failure, descending aortic aneurysm, PA-C, popliteal artery astasia bilaterally, TIA, varicose veins of both legs, metabolic encephalopathic, bilateral hearing loss, thrombocytopenia, renal cyst of the left kidney, constipation, history of DVT, HLD, vitamin D deficiency, vitamin B12 deficiency, and weight loss macrocytosis, prediabetes, status post hernia repair, endoscopic repair of thoracic aortic aneurysm,      HPI   While undergoing imaging ordered by ENT CT of the orbits and temporal bones without contrast incidental finding of an intracranial aneurysm.Daljit, reports patient and family was unaware of his intracranial aneurysm.      Imagining      1/26/2024 CT TEMPORAL BONES WITHOUT CONTRAST     INDICATION:   H91.93: Unspecified hearing loss, bilateral  H93.13: Tinnitus, bilateral  H90.A31: Mixed conductive and sensorineural hearing loss, unilateral, right ear with restricted hearing on the contralateral side.     COMPARISON: CT head without contrast 12/5/2023, 12/13/2022. CTA head and neck with and without contrast 5/28/2020.     TECHNIQUE:  OTHER FINDINGS: 1.3 cm partially calcified fusiform aneurysm in right ICA supraclinoid segment (4:34), unchanged. Mild ectasia of left ICA supraclinoid segment, unchanged. Arterial calcifications of carotid siphons and left intradural vertebral artery.     Minimal mucosal thickening in bilateral ethmoid and maxillary sinuses. Small osteoma in right ethmoid sinus.     Suspected anterior dislocation of bilateral temporomandibular joints (right worse than left).     IMPRESSION:     RIGHT TEMPORAL BONE  - Findings suggestive of chronic otomastoiditis with worsened large right mastoid effusion and large right middle ear effusion with nonaerated eustachian tube.     LEFT TEMPORAL BONE  - Findings suggestive of chronic otomastoiditis with worsened moderate-to-large left mastoid effusion and large left middle ear effusion with aerated eustachian tube.     1.3 cm partially calcified fusiform aneurysm in right ICA supraclinoid segment, unchanged. Mild ectasia of left ICA supraclinoid segment, unchanged. Recommend consultation with the [facility], a [facility] of [facility] at   [phone].     Suspected anterior dislocation of bilateral temporomandibular joints (right worse than left). Alternatively this may represent open-mouth position.     Additional chronic/incidental findings as detailed above.     The study was marked in EPIC for immediate notification.         Past Medical History:   Diagnosis Date    Acute metabolic encephalopathy 06/13/2020    Age-related cataract of right eye  04/04/2023    patient is medically cleared and presents with low risk of complication with this upcoming R cataract surgery.    Anemia     Aneurysm, aorta, thoracic (HCC)     Appendicolith     Ascending aortic aneurysm (HCC)     3.7    Asthma     BPH (benign prostatic hyperplasia)     CAD (coronary artery disease)     noted on CT scan    CHF (congestive heart failure) (HCC)     COPD (chronic obstructive pulmonary disease) (HCC)     Descending thoracic aortic aneurysm (HCC)     Diabetes mellitus (HCC)     Diverticulosis     Former tobacco use     GERD (gastroesophageal reflux disease)     History of DVT (deep vein thrombosis)     Left leg    History of transfusion     Hypertension     Hypoxemia 04/30/2023    Inguinal hernia     right    Nephrolithiasis     Oxygen dependent     2LNC    Oxygen dependent     Pneumonia     Pre-diabetes     Prostate calculus     PVD (peripheral vascular disease) (HCC)     Recurrent right inguinal hernia w incarcertion 01/04/2023    Thoracic aortic aneurysm without rupture (HCC) 11/19/2018    Added automatically from request for surgery [id number]    Thrombocytopenia (HCC) 12/29/2018    Ulcer     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)     Varicose vein of leg     b/l       Past Surgical History:   Procedure Laterality Date    CARDIAC SURGERY      ESOPHAGOGASTRODUODENOSCOPY      HERNIA REPAIR Right 1/21/2019    Procedure: REPAIR HERNIA INGUINAL WITH MESH;  Surgeon: David Lowry MD;  Location:  MAIN OR;  Service: General    HERNIA REPAIR Right 7/22/2023    Procedure: REPAIR RECURRENT INCARERATED HERNIA INGUINAL OPEN, RIGHT ORCHIECTOMY;  Surgeon: Mason Bertrand MD;  Location: [facility];  Service: General    INGUINAL HERNIA REPAIR Bilateral     IR TEVAR  12/27/2018    AR EVASC RPR DTA COVERAGE ART ORIGIN 1ST ENDOPROSTH N/A 12/27/2018    Procedure: TEVAR - endovascular thoracic aortic aneurysm repair;  Surgeon: Gladis Ortega MD;  Location: [facility];  Service: Vascular     THORACIC AORTIC ANEURYSM REPAIR  12/27/2018    VARICOSE VEIN SURGERY Bilateral     vein stripping       Current Outpatient Medications   Medication Sig Dispense Refill    albuterol (2.5 mg/3 mL) 0.083 % nebulizer solution Take 3 mL (2.5 mg total) by nebulization every 6 (six) hours as needed for wheezing or shortness of breath 75 mL 5    albuterol (PROVENTIL HFA,VENTOLIN HFA) 90 mcg/act inhaler INHALE 2 PUFFS EVERY 6 (SIX) HOURS AS NEEDED FOR WHEEZING 18 g 5    brimonidine tartrate 0.2 % ophthalmic solution INSTILL 1 DROP IN LEFT EYE TWICE DAILY INSTILL 1 DROP IN LEFT EYE DOS VECES AL EULALIO      budesonide (Pulmicort) 0.5 mg/2 mL nebulizer solution Take 2 mL (0.5 mg total) by nebulization 2 (two) times a day Rinse mouth after use. 360 mL 3    formoterol (PERFOROMIST) 20 MCG/2ML nebulizer solution Take 2 mL (20 mcg total) by nebulization 2 (two) times a day 120 mL 30    ipratropium-albuterol (DUO-NEB) 0.5-2.5 mg/3 mL nebulizer solution Take 3 mL by nebulization 2 (two) times a day 540 mL 3    losartan (COZAAR) 25 mg tablet Take 2 tablets (50 mg total) by mouth 2 (two) times a day 180 tablet 1    pantoprazole (PROTONIX) 40 mg tablet TAKE 1 TABLET (40 MG TOTAL) BY MOUTH DAILY (Patient not taking: Reported on 2/13/2024) 30 tablet 5    polyethylene glycol (GLYCOLAX) 17 GM/SCOOP powder  (Patient not taking: Reported on 2/13/2024)      QUEtiapine (SEROquel) 25 mg tablet Take 1 tablet (25 mg total) by mouth daily at bedtime (Patient not taking: Reported on 1/29/2024) 30 tablet 0     No current facility-administered medications for this visit.        Allergies   Allergen Reactions    Penicillins Hives, Itching and Rash    Lisinopril Rash     Side pains and rash     Zyprexa [Olanzapine] Tongue Swelling       Review of Systems   Neurological:         Chief Complaint: 1.3CM FUSIFORM ANEURYSM  headaches  no  dizziness  no BUT IN PAST   seizure  no  neausea / vomitting  no  change in hearing  yes FEELS LIKE WATER IN HIS  EAR  increase in falls  no  difficulity walking  yes KNEES  pain other than HA  no  weakness yes left leg and right leg  blurred vision  yes NOT NEW  double vision  no  difficulity with speech  no  incontinence  no  allergy to contrast dye  no  Hx of kidney disease  no     Length of time symptoms present: CT ORBITS ORDERED FOR HEARING LOSS  Treatment to date-specific complaint:           Studies done  CT ORBITS 1/26/24   CT HEAD 12/5/23    All other systems reviewed and are negative.      Video Exam    There were no vitals filed for this visit.    Physical Exam  Constitutional:       General: He is not in acute distress.  Pulmonary:      Comments: Continuous oxygen 3 L/min  Neurological:      Mental Status: He is alert.   Psychiatric:         Mood and Affect: Mood normal.         Behavior: Behavior normal.          Visit Time  Total Visit Duration: 35    I have spent a total time of 35 minutes on 02/13/24 in caring for this patient including Diagnostic results, Risks and benefits of tx options, Instructions for management, Patient and family education, Importance of tx compliance, Risk factor reductions, Impressions, Counseling / Coordination of care, Documenting in the medical record, Reviewing / ordering tests, medicine, procedures  , Obtaining or reviewing history  , and Communicating with other healthcare professionals .

## 2024-02-13 NOTE — ASSESSMENT & PLAN NOTE
Notes    Identified risk factors for aneurysm growth and rupture   HTN --HO treating with medication  HLD  --Son reports patient has an elevated cholesterol and is on medication , he is unsure of the name. Record review Lipid profile -WNL.  Tobacco dependence --stopped smoking about 20 years ago , Then was diagnosed w/ COPD.   Need for PCP over site of co- morbid risk conditions ever 3-6 months or as otherwise specified --sone verified has regular visits with PCP.   Identify if you have 2 or more first degree relatives diagnosed with known brain aneurysms or have  suddenly of an unknown cause, grandson reports he found his grandfather, his father's father, when he was a young boy  in the bed with blood coming out of his nose.  He is unsure if it was an aneurysm.  For this reason I explained to the son since his father was diagnosed with an aneurysm not only intracranial but also underwent surgery for another aneurysm and the information he is provided about his grandfather he should follow-up with other first-degree relatives of the need to notify their physicians of this information and receive orders for brain imaging to assess if they have an aneurysm.      Imagining   2024 --OTHER FINDINGS: 1.3 cm partially calcified fusiform aneurysm in right ICA supraclinoid segment (4:34), unchanged. Mild ectasia of left ICA supraclinoid segment, unchanged. Arterial calcifications of carotid siphons and left intradural vertebral artery.    Plan     Discussed imagining result  with patient   Explained as per research in a patient over age 75 no surgical intervention or ongoing imagining surveillance.indicated but son wishes to have formal imaging of the brain to validate findings seen on CT imaging.  Extensively discussed natural nature of aneurysms.    Discussed risk of rupture is less than 1%/year per UCAS and <1%/5yrs per ISIUA.    Discussed modifiable risk factors including hypertension, hyperlipidemia,  "and smoking.    Discussed role of family history as documented above.    Discussed signs and symptoms of aneurysm rupture including severe, sudden onset headache, neck pain, nausea and vomiting, and seizure.  Reiterated that the symptoms should prompt patient to visit in emergency department immediately.    Explained If you experience ANY of the following, call 911 or our office IMMEDIATELY:  SUDDEN severe headache (\"worst headache of my life\" WHOL)  Seizures  Nausea or vomiting  Vision or speech disturbances  Neck pain   Patient will follow-up prn or if symptoms worsen.      Ordered CTA of the head with and without contrast.  Ordered labs secondary to contrast.  Checkout will schedule follow-up appointment within 3 days to 1 week of completion of imaging as a joint with an endovascular surgeon and I.  Advised if he/she has additional questions or concerns to please contact the neurosurgery office.   Advised of aneurysm teaching information documented in the after visit summary.  "

## 2024-02-13 NOTE — Clinical Note
Please f/u Wednesday schedule CTA w/wo contrast  ordered lab, f/u snpx visit with Tanya or Sergio and I  3 days to one week post completion

## 2024-02-14 ENCOUNTER — TELEPHONE (OUTPATIENT)
Dept: NEUROSURGERY | Facility: CLINIC | Age: 89
End: 2024-02-14

## 2024-02-14 NOTE — TELEPHONE ENCOUNTER
RAQUEL Tse MA  Thank You . That is OK          Previous Messages       ----- Message -----  From: Yanique Shane MA  Sent: 2/14/2024   2:24 PM EST  To: Kimberly Fabian; RAQUEL Tse; *  Subject: appointments                                    Hi Brandi,    Patient is scheduled for CTA on 2/21/24 and his follow up is on 3/1/24 with Tanya Onofrex which is his next available both docs are fully booked unless Vilma can overbook sooner but everything is already scheduled.    Thank you      ----- Message from RAQUEL Tse sent at 2/13/2024  4:52 PM EST -----  Please f/u Wednesday schedule CTA w/wo contrast  ordered lab, f/u snpx visit with Tanya or Sergio and ESAU  3 days to one week post completion

## 2024-02-14 NOTE — PROGRESS NOTES
"PULMONARY REHAB ASSESSMENT      Today's date: 2024  Patient name: Severiano Feliciano     : 1935       MRN: [id number]  PCP: Kirby Casanova MD  Referring Physician: Buddy Hayward MD  Pulmonologist: Buddy Hayward MD    Dx:   Encounter Diagnosis   Name Primary?    Chronic obstructive pulmonary disease, unspecified COPD type (HCC)          Date of onset: 2024  Cultural needs: Khmer speaking- needs interpretation- son was here for translation    Weight:    Wt Readings from Last 1 Encounters:   24 62.1 kg (137 lb)      Height:   Ht Readings from Last 1 Encounters:   24 5' 3\" (1.6 m)       Medical History:   Past Medical History:   Diagnosis Date    Acute metabolic encephalopathy 2020    Age-related cataract of right eye 2023    patient is medically cleared and presents with low risk of complication with this upcoming R cataract surgery.    Anemia     Aneurysm, aorta, thoracic (HCC)     Appendicolith     Ascending aortic aneurysm ([facility])     3.7    Asthma     BPH (benign prostatic hyperplasia)     CAD (coronary artery disease)     noted on CT scan    CHF (congestive heart failure) (HCC)     COPD (chronic obstructive pulmonary disease) (HCC)     Descending thoracic aortic aneurysm (HCC)     Diabetes mellitus (HCC)     Diverticulosis     Former tobacco use     GERD (gastroesophageal reflux disease)     History of DVT (deep vein thrombosis)     Left leg    History of transfusion     Hypertension     Hypoxemia 2023    Inguinal hernia     right    Nephrolithiasis     Oxygen dependent     2LNC    Oxygen dependent     Pneumonia     Pre-diabetes     Prostate calculus     PVD (peripheral vascular disease) ([facility])     Recurrent right inguinal hernia w incarcertion 2023    Thoracic aortic aneurysm without rupture ([facility]) 2018    Added automatically from request for surgery [id number]    Thrombocytopenia ([facility]) 2018    Ulcer     Ulcerative (chronic) proctitis " without complications (HCC)     Varicose vein of leg     b/l       Family History:  Family History   Problem Relation Age of Onset    Tuberculosis Mother     No Known Problems Father     Cancer Sister     Diabetes Family     Hypertension Family        Allergies:   Penicillins, Lisinopril, and Zyprexa [olanzapine]    ETOH:   Social History     Substance and Sexual Activity   Alcohol Use Never       Current Medications:   Current Outpatient Medications   Medication Sig Dispense Refill    albuterol (2.5 mg/3 mL) 0.083 % nebulizer solution Take 3 mL (2.5 mg total) by nebulization every 6 (six) hours as needed for wheezing or shortness of breath 75 mL 5    albuterol (PROVENTIL HFA,VENTOLIN HFA) 90 mcg/act inhaler INHALE 2 PUFFS EVERY 6 (SIX) HOURS AS NEEDED FOR WHEEZING 18 g 5    brimonidine tartrate 0.2 % ophthalmic solution INSTILL 1 DROP IN LEFT EYE TWICE DAILY INSTILL 1 DROP IN LEFT EYE DOS VECES AL EULALIO      budesonide (Pulmicort) 0.5 mg/2 mL nebulizer solution Take 2 mL (0.5 mg total) by nebulization 2 (two) times a day Rinse mouth after use. 360 mL 3    formoterol (PERFOROMIST) 20 MCG/2ML nebulizer solution Take 2 mL (20 mcg total) by nebulization 2 (two) times a day 120 mL 30    ipratropium-albuterol (DUO-NEB) 0.5-2.5 mg/3 mL nebulizer solution Take 3 mL by nebulization 2 (two) times a day 540 mL 3    losartan (COZAAR) 25 mg tablet Take 2 tablets (50 mg total) by mouth 2 (two) times a day 180 tablet 1    pantoprazole (PROTONIX) 40 mg tablet TAKE 1 TABLET (40 MG TOTAL) BY MOUTH DAILY (Patient not taking: Reported on 2/13/2024) 30 tablet 5    polyethylene glycol (GLYCOLAX) 17 GM/SCOOP powder  (Patient not taking: Reported on 2/13/2024)      QUEtiapine (SEROquel) 25 mg tablet Take 1 tablet (25 mg total) by mouth daily at bedtime (Patient not taking: Reported on 1/29/2024) 30 tablet 0     No current facility-administered medications for this visit.         Physical Limitations: occ lower back pain and bilateral knee  pain when using steps.     Fall Risk: High   Comments: Patient uses walking assist device (walker/cane/rollator) and Denies a fall in the past 6 months    Oxygen needs:   Rest:  supplemental O2 via nasal cannula @ 3L/min   Exercise/physical activity:  supplemental O2 via nasal cannula @ 4L/min   Sleep:   supplemental O2 via nasal cannula @ 3L/min     Patient has Rx for supplemental O2:  yes    Rating of Perceived Dyspnea at rest:  0/10    Does Pt monitor home SpO2? yes   Average SpO2 at rest:  94-95%   Average SpO2 with ADLs/physical activity:  80-90%    History of Toxic Exposure:  Occupational exposure to workplace  fumes    Use of Rescue Inhaler: Yes:  1-2 times per week    Use of Maintenance Inhaler: Yes:  1-2 times per day    Use of Nebulizer Treatments:  Yes:  3 times per day    Patient practices breathing techniques at home:  no      CAD Risk Factors   Cholesterol: Yes  Smoking: quit 20 years ago, was smoking 1pack per week for 45 years  HTN: Yes  DM: No  Obesity: No   Inactivity: Yes  Stress:  perceived  stress: 2/10   Stressors:None reported, occ life stress   Goals for Stress Management: Practice Relaxation Techniques, Exercise, Spend time outside, and Enjoy a hobby      Current Functional Status  Occupation: retired  Recreation: None  ADL’s:Capable of performing light ADLs only limited by Dyspnea  Weakness  [place]: limited by Dyspnea  Weakness able to perform self-care  Exercise: None  Home exercise equipment: None    Patient Specific Goals:     EXERCISE GOALS (home exercise, ADLs):   Increase independence; son wants to purchase home ergometer   NUTRITION GOALS (wt management, diabetes management, dietary modifications):   None   PSYCHOCOSOCIAL GOALS (stress, emotional well being, social support):   None   CORE COMPONENT GOALS (smoking, BP control, medication):   BP control and take inhalers properly and as prescribed     Oxygen Goals: Maintain SpO2>88% titrating supplemental oxygen as needed      Ability to reach goals/rehabilitation potential:  Good    Projected return to function: 8-12 weeks  Objective tests: 6 MWT      Nutritional   Reviewed details of Rate your Plate. Discussed key elements of heart healthy eating. Reviewed patient goals for dietary modifications and their clinical implications.  Reviewed most recent lipid profile.     Goals for dietary modification: increase whole grains  increase fruits and vegetables  improved snack choices  reduce sweets/frozen desserts      Psychosocial Assessment as it relates to rehabilitation  Are there any aspects of the patient's family and home situation that will affect their rehabilitation?  No    Assessment of depression and anxiety   Patient reports they are coping well with good social support and denies depression or anxiety    Psychosocial Evaluation:   PHQ-9: 9  5-9 = Mild Depression   LILA-7: 6  5-9 = Mild anxiety       Marital status:   Other Social Support: children, grandchildren, and spouse    Domestic Violence Screening: No    Comments: years with COPD- quit smoking 20 years ago, 1 pack a week for 45 years; has been on O2 for while. History of pneumonia. AA repair about 2020.

## 2024-02-15 ENCOUNTER — HOSPITAL ENCOUNTER (OUTPATIENT)
Dept: PULMONOLOGY | Facility: HOSPITAL | Age: 89
End: 2024-02-15
Payer: MEDICARE

## 2024-02-15 ENCOUNTER — CLINICAL SUPPORT (OUTPATIENT)
Dept: PULMONOLOGY | Facility: CLINIC | Age: 89
End: 2024-02-15
Payer: MEDICARE

## 2024-02-15 DIAGNOSIS — J96.22 ACUTE ON CHRONIC RESPIRATORY FAILURE WITH HYPOXIA AND HYPERCAPNIA (HCC): ICD-10-CM

## 2024-02-15 DIAGNOSIS — J44.9 CHRONIC OBSTRUCTIVE PULMONARY DISEASE, UNSPECIFIED COPD TYPE (HCC): Primary | ICD-10-CM

## 2024-02-15 DIAGNOSIS — J96.21 ACUTE ON CHRONIC RESPIRATORY FAILURE WITH HYPOXIA AND HYPERCAPNIA (HCC): ICD-10-CM

## 2024-02-15 PROCEDURE — 94010 BREATHING CAPACITY TEST: CPT

## 2024-02-15 PROCEDURE — 36600 WITHDRAWAL OF ARTERIAL BLOOD: CPT

## 2024-02-15 RX ORDER — ALBUTEROL SULFATE 2.5 MG/3ML
2.5 SOLUTION RESPIRATORY (INHALATION) ONCE
Status: DISCONTINUED | OUTPATIENT
Start: 2024-02-15 | End: 2024-02-19 | Stop reason: HOSPADM

## 2024-02-15 NOTE — PROGRESS NOTES
Pulmonary Rehabilitation Plan of Care   Initial Care Plan      Today's date: 2/15/2024   # of Exercise Sessions Completed: 1-Evaluation  Patient name: Severiano Feliciano      : 1935  Age: 88 y.o.       MRN: [id number]  Referring Physician: Buddy Hayward MD  Pulmonologist: Buddy Hayward MD    Provider: [place]  Clinician: Erin Gabriel MS, CEP    Dx:    Encounter Diagnosis   Name Primary?    Chronic obstructive pulmonary disease, unspecified COPD type (HCC) Yes     Date of onset: 2024      SUMMARY OF PROGRESS: Today is Severiano's initial evaluation to begin Pulmonary Rehab for the diagnosis of COPD. He had a PFT scheduled for today but was unable to complete due to severe hearing loss, and he was not wearing his hearing aids. He did not wear his hearing aids for today's evaluation either. Severiano's son was used for translation. Severiano has further medical history of  HTN, HLD and aortic aneurysm repair in 2020. Severiano is able to complete only light self care, such as going to the bathroom but needs assistance for bathing and changing. He cannot tolerate much activity around the house either and relies heavily on his family.  The patient currently does not follow a home exercise program but his son expressed interest in getting him a table top ergometer for home use. Depression screening using the PHQ-9 interprets the patient's score of 9 5-9 = Mild Depression. Anxiety screening using the LILA-7 interprets the patients score of 6  5-9 = Mild anxiety. When addressed, patient's son denies anxiety and depression on behalf of the patient. Patient reports excellent social/emotional support from family and spouse.The patient is a former smoker (quit 20 years ago). Patient admits to 100% medication compliance, managed by his family.  At rest, the patient rated dyspnea 0/10 with SpO2 97% on supplemental O2 3L/min via nasal canula.  They completed an initial 6MWT, walking 530 ft on  supplemental O2 3L/min via nasal canula. The patient’s rating of perceived dyspnea during the 6MWT was 5/10 with SpO2 dropping to 80% at minute 4.  Patient took 1 brief standing rest break until SpO2 increased to 89% and then continued walking. Telemetry revealed NSR, with freq PACs, bigeminy PACs at rest and rare PVCs.   Resting  /80 with appropriate hemodynamic response to exercise reaching 172/80.  Patient will exercise on supplemental O2 3L/min via nasal canula and will titrate O2 to maintain SpO2 >88%. Group and individualized education will be provided on smoking cessation, oxygen use, breathing techniques, pulmonary anatomy, exercise for the pulmonary patient, healthy eating, stress, and relaxation.  Patient specific goals include: become more independent and move more at home.  Severiano will attend 24 exercise sessions beginning 2/20/24.  Will progress patient as tolerated over the next 30 days.  See outlined plan of care below for specific patient goals in each component of care.         Medication compliance: Yes   Comments: Pt reports to be compliant with medications    Fall Risk: High   Comments: Patient uses walking assist device (walker/cane/rollator) and Denies a fall in the past 6 months    Smoking: Former user  Quit 20 years ago    Pulmonary Disease Risk Factors:  Occupational exposure to workplace  fumes    RPD at Rest: 0/10  RPD with Exercise:  5/10    Assessment of progression of lung disease and functional status:  CAT: not assessed today  Shortness of breath questionnaire: 54/120      EXERCISE ASSESSMENT and PLAN    Current Exercise Program in Rehab:       Frequency: 2 days/week   Supplement with home exercise 2+ days/wk as tolerated        Minutes: 20-40         METS: 1.5-2.0              SpO2: >88%              RPD: 4-6                      HR: 50-85% HRR or RHR +30-40bpm:    RPE: 4-5         Modalities: UBE, NuStep, and Room walking      Exercise Progression 30 Day Goals :     Frequency: 2 days/week    Supplement with home exercise 2+ days/wk as tolerated       Minutes: 30-40        METS: 2.0-2.5              SpO2: >88%              RPD: 4-6                      HR:    RPE: 4-5        Modalities: UBE, NuStep, Recumbent bike, and Room walking     Strength trainin-3 days / week  12-15 repetitions  1-2 sets per modality   Will be added following 2-3 weeks of monitored exercise sessions   Modalities: Arm Curl, Sit to Stands, Wall push-ups, Front Raises, Shoulder Shrugs, and Calf Raises    Home Exercise: none    Education: pursed lipped breathing, fall prevention while using nasal canula tubing, relaxation breathing, benefits of exercise for pulmonary disease, RPD scale, O2 saturation monitoring, and appropriate O2 response to exercise    SMART Goals:   improved 6MWT distance, reduced dyspnea during exercise, improved exercise tolerance based on peak METs tolerated in pulmonary rehab exercise session, and SpO2 >88% during exercise    Patient Specific EXERCISE GOALS:   (home exercise, ADLs): reduced dependence on supplemental O2, attend pulmonary rehab regularly, decrease sitting time at home, start a walking program, reduced pulmonary related hospital readmissions , increased muscular strength, increased energy, increased stamina with ADLs, and more independence at home    Patient's progress toward SMART and personal goals: Reviewed goals with patient and family today    Patient's goals for next 30 days: start pulmonary rehab and attend regularly 2x/week; reduce sitting time at home    Plan: Titrate supplemental oxygen as needed to maintain SpO2>88% with exercise, reduce time sitting at home, and utilize PLB with physical activity    Readiness to change: Preparation:  (Getting ready to change)       NUTRITION ASSESSMENT AND PLAN    Weight control:    Starting weight: 137 lbs   Current weight:       Diabetes: N/A    SMART Goals:   eat 6 or more servings of grain products per day,  eat 5 or more servings of fruits and vegetables a day, rarely eat processed meats or eat low fat processed meats, Do not eat fried foods, choose healthy snacks such as fruit, pretzels, and low fat crackers, choose healthy desserts and sweets such as patrice food cake or  fruit, choose low sugar desserts and sweets, and drink less than 8oz of soda and sweetened drinks per day      Patient Specific NUTRITION GOALS:  (wt control, diabetes management, dietary modifications): improve hydration increased muscle mass    Patient's progress toward SMART and personal goals: Reviewed RYP score today with patient and family    Patient's goals for next 30 days: reduce sweets      Measurable goals were based Rate Your Plate Dietary Self-Assessment. These are the areas in which the patient could score higher on the assessment.  Goals include recommendations for a heart healthy diet based on American Heart Association.    Education: heart healthy eating principles    Plan: group Class: Heart Healthy Eating, replace refined flours with whole grains, increase daily intake of fruits and vegetables, increase daily intake of low fat dairy, eat healthy snacks like fruit, pretzels, and low fat crackers, and choose desserts such as fruit, patrice food cake, low-fat or fat-free sweets    Readiness to change: Preparation:  (Getting ready to change)       PSYCHOSOCIAL ASSESSMENT AND PLAN    Emotional:  Depression assessment:  PHQ-9 = 9  5-9 = Mild Depression            Anxiety measure:  LILA-7 = 6  5-9 = Mild anxiety    Assessment of depression and anxiety    Patient's son denies anxiety and depression on patient's behalf    Self-reported stress level:  2  Stress Management: Practice Relaxation Techniques, Exercise, and Enjoy a hobby    Patient's rating of Social support: Very Good   Social Support Network: children, grandchildren, and spouse    Psychosocial Assessment as it relates to rehabilitation: Patient denies issues with his/her family or  home life that may affect their rehabilitation efforts.       SMART Goals:    PHQ-9 - reduced severity by one level, Physical Fitness in [place] Score < 3, Daily Activity in [facility] Score < 3, [facility] in [facility] Score < 3, Overall Health in [facility] Score < 3, Quality of Life in [facility] Score < 3 , Change in Health in [facility] Score < 3 ,  Increased interest and pleasure in doing things, and feel less tired with more energy    Patient Specific PSYCHOCOSOCIAL GOALS:  (stress, emotional well being, social support): spend time with family    Patient's progress toward SMART and personal goals: Reviewed goals with patient and family    Patient's goals for next 30 days: Be more active and increase energy level in return      Education: benefits of a positive support system, stress management techniques, and depression and pulmonary disease    Plan: Exercise, Reduce dependence  on family, Return to previous social activity, and Avoid triggers    Readiness to change: Preparation:  (Getting ready to change)       OTHER CORE COMPONENTS     Tobacco:   Social History     Tobacco Use   Smoking Status Former    Current packs/day: 0.00    Average packs/day: 1 pack/day for 35.0 years (35.0 ttl pk-yrs)    Types: Cigarettes    Start date:     Quit date:     Years since quittin.1   Smokeless Tobacco Never       Tobacco Use Intervention: Referral to tobacco expert:   Pt quit 20 years ago   and has abstained    Blood pressure:    Restin/80   Exercise: 172/82    Oxygen needs:   Rest:  supplemental O2 via nasal cannula @ 3L/min   Exercise/physical activity:  supplemental O2 via nasal cannula @ 3-4L/min   Sleep:   supplemental O2 via nasal cannula @ 3L/min     Oxygen Goal: Maintain SpO2>88% during exercise    SMART Goals: reduced dietary sodium <2000mg and medication compliance    Patient Specific CORE COMPONENT GOALS (smoking, BP control, angina control, medication compliance): Take inhalers properly to ensure he  is receiving the medication    Patient's progress toward SMART and personal goals: Reviewed goals with patient and family    Patient's goals for next 30 days: Contact Dr. Hayward to go over proper inhaler use    Education:  pathophysiology of pulmonary disease, inspiratory muscle training, and education: Pulmonary Anatomy and Physiology    Plan: medication compliance, engage in regular exercise, and monitor home BP    Readiness to change: Preparation:  (Getting ready to change)

## 2024-02-19 ENCOUNTER — OFFICE VISIT (OUTPATIENT)
Dept: FAMILY MEDICINE CLINIC | Facility: CLINIC | Age: 89
End: 2024-02-19
Payer: MEDICARE

## 2024-02-19 VITALS
SYSTOLIC BLOOD PRESSURE: 136 MMHG | HEART RATE: 80 BPM | HEIGHT: 63 IN | RESPIRATION RATE: 20 BRPM | DIASTOLIC BLOOD PRESSURE: 80 MMHG | TEMPERATURE: 97.9 F | BODY MASS INDEX: 24.27 KG/M2 | OXYGEN SATURATION: 98 % | WEIGHT: 137 LBS

## 2024-02-19 DIAGNOSIS — J96.11 CHRONIC RESPIRATORY FAILURE WITH HYPOXIA (HCC): Primary | ICD-10-CM

## 2024-02-19 DIAGNOSIS — I71.9 DESCENDING AORTIC ANEURYSM (HCC): ICD-10-CM

## 2024-02-19 DIAGNOSIS — K29.50 CHRONIC GASTRITIS WITHOUT BLEEDING, UNSPECIFIED GASTRITIS TYPE: ICD-10-CM

## 2024-02-19 DIAGNOSIS — E87.3 ALKALOSIS: ICD-10-CM

## 2024-02-19 DIAGNOSIS — E05.90 HYPERTHYROIDISM: ICD-10-CM

## 2024-02-19 DIAGNOSIS — Z79.52 LONG TERM (CURRENT) USE OF SYSTEMIC STEROIDS: ICD-10-CM

## 2024-02-19 DIAGNOSIS — L21.9 SEBORRHEA: ICD-10-CM

## 2024-02-19 DIAGNOSIS — I50.33 ACUTE ON CHRONIC DIASTOLIC CHF (CONGESTIVE HEART FAILURE) (HCC): ICD-10-CM

## 2024-02-19 PROCEDURE — 99214 OFFICE O/P EST MOD 30 MIN: CPT | Performed by: FAMILY MEDICINE

## 2024-02-19 RX ORDER — KETOCONAZOLE 20 MG/ML
1 SHAMPOO TOPICAL 2 TIMES WEEKLY
Qty: 120 ML | Refills: 5 | Status: SHIPPED | OUTPATIENT
Start: 2024-02-19

## 2024-02-19 RX ORDER — ACETAZOLAMIDE 125 MG/1
125 TABLET ORAL DAILY
Qty: 30 TABLET | Refills: 1 | Status: SHIPPED | OUTPATIENT
Start: 2024-02-19

## 2024-02-19 RX ORDER — MOMETASONE FUROATE 1 MG/ML
SOLUTION TOPICAL DAILY
Qty: 60 ML | Refills: 5 | Status: SHIPPED | OUTPATIENT
Start: 2024-02-19

## 2024-02-19 RX ORDER — PREDNISONE 5 MG/1
5 TABLET ORAL DAILY
Qty: 30 TABLET | Refills: 2 | Status: SHIPPED | OUTPATIENT
Start: 2024-02-19

## 2024-02-19 RX ORDER — PANTOPRAZOLE SODIUM 40 MG/1
40 TABLET, DELAYED RELEASE ORAL DAILY
Qty: 30 TABLET | Refills: 5 | Status: SHIPPED | OUTPATIENT
Start: 2024-02-19

## 2024-02-19 NOTE — PROGRESS NOTES
Name: Severiano Feliciano      : 1935      MRN: [id number]  Encounter Provider: Kirby Casanova MD  Encounter Date: 2024   Encounter department: [facility] PRIMARY [facility]    Assessment & Plan     1. Chronic respiratory failure with hypoxia (HCC)  -     predniSONE 5 mg tablet; Take 1 tablet (5 mg total) by mouth daily    2. Descending aortic aneurysm (HCC)    3. Acute on chronic diastolic CHF (congestive heart failure) (HCC)    4. Chronic gastritis without bleeding, unspecified gastritis type    5. Long term (current) use of systemic steroids  -     pantoprazole (PROTONIX) 40 mg tablet; Take 1 tablet (40 mg total) by mouth daily    6. Hyperthyroidism  -     TSH, 3rd generation with Free T4 reflex; Future  -     Thyroid Antibodies Panel; Future    7. Seborrhea  -     ketoconazole (NIZORAL) 2 % shampoo; Apply 1 Application topically 2 (two) times a week  -     mometasone (ELOCON) 0.1 % lotion; Apply topically daily    8. Alkalosis  -     acetaZOLAMIDE (DIAMOX) 125 mg tablet; Take 1 tablet (125 mg total) by mouth in the morning  -     Basic metabolic panel; Future; Expected date: 2024      Chief Complaint  88-year-old male for follow-up of anemia, COPD, hypertension, and abdominal pain.    History of Present Illness  The patient presents for an annual follow-up. He has a history of anemia with stable hemoglobin levels averaging 11.6 g/dL over the past year. His MCV is elevated at 108 fL. He has chronic obstructive pulmonary disease (COPD) and hypertension. The patient also reports abdominal pain. Previous exercise testing was inconclusive due to the patient's inability to perform true effort and non-compliance with instructions.    Medications  Acetazolamide 125 mg daily.    Allergies  No known drug allergies.    Past Medical History  Anemia, COPD, hypertension, aortic aneurysm.    Past Surgical History  No surgical history provided.    Social History  No social history  "provided.    Family History  No family history provided.    Review of Systems  The patient complains of abdominal pain. No other systems reviewed.    Vital Signs  /80 (BP Location: Left arm, Patient Position: Sitting, Cuff Size: Standard)   Pulse 80   Temp 97.9 °F (36.6 °C) (Tympanic)   Resp 20   Ht 5' 3\" (1.6 m)   Wt 62.1 kg (137 lb)   SpO2 98% Comment: pt wearing oxygen 4 liters  BMI 24.27 kg/m²       Physical Exam  He looks in non distress; severe hearing loss. oriented Person, Place, and Day, HEENT wearing O2 Nasal canula.  Lungs are poor entrance, ranches bilaterally.. Heart is having Normal rhythm w/o murmurs.  Abdomen is soft, non tender. MS: Muscle strength and tone were normal.  Neurological system has no deficit and walks normal.      Lab Results  Last hemoglobin on January 29, 2024, was 11.6 g/dL. MCV was 108 fL.    Imaging and other Relevant Results  CT scan from December 27, 2023, showed no change in aortic aneurysm. 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    Assessment and Plan  Anemia: Continue to monitor hemoglobin levels. Consider further workup if there is a significant drop in hemoglobin or patient becomes symptomatic.  COPD: Last ABG on January 29, 2024, showed compensated respiratory alkalosis. Continue acetazolamide 125 mg daily and recheck bicarbonate levels in two weeks.  Hypertension: Blood pressure is controlled. Continue current antihypertensive regimen and monitor blood pressure regularly.  Abdominal Pain: Further history and examination are needed to elucidate the cause. Consider abdominal imaging or referral to gastroenterology if pain persists or worsens.  Aortic Aneurysm: No change on the last CT scan. Schedule follow-up imaging for December 2024 as recommended.  Scalp Inflammation: Evaluate the subcutaneous nodule noted on physical exam. If it persists or changes, " consider dermatology referral for further assessment.    Additional Notes:  Patient's exercise tolerance is limited, and compliance with instructions for testing may be an issue. Close monitoring and clear communication are essential.

## 2024-02-20 ENCOUNTER — CLINICAL SUPPORT (OUTPATIENT)
Dept: PULMONOLOGY | Facility: CLINIC | Age: 89
End: 2024-02-20
Payer: MEDICARE

## 2024-02-20 DIAGNOSIS — J44.9 CHRONIC OBSTRUCTIVE PULMONARY DISEASE, UNSPECIFIED COPD TYPE (HCC): Primary | ICD-10-CM

## 2024-02-20 PROBLEM — I27.20 PULMONARY HYPERTENSION (HCC): Status: ACTIVE | Noted: 2024-02-20

## 2024-02-20 PROCEDURE — G0239 OTH RESP PROC, GROUP: HCPCS

## 2024-02-21 ENCOUNTER — HOSPITAL ENCOUNTER (EMERGENCY)
Facility: HOSPITAL | Age: 89
Discharge: HOME | End: 2024-02-21
Attending: EMERGENCY MEDICINE
Payer: MEDICARE

## 2024-02-21 ENCOUNTER — APPOINTMENT (EMERGENCY)
Dept: RADIOLOGY | Facility: HOSPITAL | Age: 89
End: 2024-02-21
Payer: MEDICARE

## 2024-02-21 ENCOUNTER — APPOINTMENT (EMERGENCY)
Dept: CT IMAGING | Facility: HOSPITAL | Age: 89
End: 2024-02-21
Payer: MEDICARE

## 2024-02-21 VITALS
DIASTOLIC BLOOD PRESSURE: 89 MMHG | RESPIRATION RATE: 17 BRPM | HEART RATE: 80 BPM | TEMPERATURE: 98.2 F | SYSTOLIC BLOOD PRESSURE: 174 MMHG | BODY MASS INDEX: 25.47 KG/M2 | WEIGHT: 143.8 LBS | OXYGEN SATURATION: 98 %

## 2024-02-21 DIAGNOSIS — R41.82 ALTERED MENTAL STATUS, UNSPECIFIED ALTERED MENTAL STATUS TYPE: Primary | ICD-10-CM

## 2024-02-21 LAB
ALBUMIN SERPL BCP-MCNC: 4.1 G/DL (ref 3.5–5)
ALP SERPL-CCNC: 73 U/L (ref 34–104)
ALT SERPL W P-5'-P-CCNC: 12 U/L (ref 7–52)
ARTERIAL PATENCY WRIST A: YES
AST SERPL W P-5'-P-CCNC: 21 U/L (ref 13–39)
BASE EXCESS BLDA CALC-SCNC: 11.2 MMOL/L
BASOPHILS # BLD AUTO: 0.03 THOUSANDS/ÂΜL (ref 0–0.1)
BASOPHILS NFR BLD AUTO: 1 % (ref 0–1)
BILIRUB SERPL-MCNC: 0.38 MG/DL (ref 0.2–1)
BILIRUB UR QL STRIP: NEGATIVE
BUN SERPL-MCNC: 18 MG/DL (ref 5–25)
CALCIUM SERPL-MCNC: 9.9 MG/DL (ref 8.4–10.2)
CHLORIDE SERPL-SCNC: 94 MMOL/L (ref 96–108)
CLARITY UR: CLEAR
CO2 SERPL-SCNC: >45 MMOL/L (ref 21–32)
COLOR UR: NORMAL
CREAT SERPL-MCNC: 0.79 MG/DL (ref 0.6–1.3)
EOSINOPHIL # BLD AUTO: 0.34 THOUSAND/ÂΜL (ref 0–0.61)
EOSINOPHIL NFR BLD AUTO: 7 % (ref 0–6)
ERYTHROCYTE [DISTWIDTH] IN BLOOD BY AUTOMATED COUNT: 12.4 % (ref 11.6–15.1)
FLUAV RNA RESP QL NAA+PROBE: NEGATIVE
FLUBV RNA RESP QL NAA+PROBE: NEGATIVE
GFR SERPL CREATININE-BSD FRML MDRD: 81 ML/MIN/1.73SQ M
GLUCOSE SERPL-MCNC: 137 MG/DL (ref 65–140)
GLUCOSE UR STRIP-MCNC: NEGATIVE MG/DL
HCO3 BLDA-SCNC: 39 MMOL/L (ref 22–28)
HCT VFR BLD AUTO: 40 % (ref 36.5–49.3)
HGB BLD-MCNC: 12 G/DL (ref 12–17)
HGB UR QL STRIP.AUTO: NEGATIVE
IMM GRANULOCYTES # BLD AUTO: 0.02 THOUSAND/UL (ref 0–0.2)
IMM GRANULOCYTES NFR BLD AUTO: 0 % (ref 0–2)
KETONES UR STRIP-MCNC: NEGATIVE MG/DL
LEUKOCYTE ESTERASE UR QL STRIP: NEGATIVE
LYMPHOCYTES # BLD AUTO: 1.1 THOUSANDS/ÂΜL (ref 0.6–4.47)
LYMPHOCYTES NFR BLD AUTO: 22 % (ref 14–44)
MCH RBC QN AUTO: 32.4 PG (ref 26.8–34.3)
MCHC RBC AUTO-ENTMCNC: 30 G/DL (ref 31.4–37.4)
MCV RBC AUTO: 108 FL (ref 82–98)
MONOCYTES # BLD AUTO: 0.37 THOUSAND/ÂΜL (ref 0.17–1.22)
MONOCYTES NFR BLD AUTO: 7 % (ref 4–12)
NASAL CANNULA: 3
NEUTROPHILS # BLD AUTO: 3.26 THOUSANDS/ÂΜL (ref 1.85–7.62)
NEUTS SEG NFR BLD AUTO: 63 % (ref 43–75)
NITRITE UR QL STRIP: NEGATIVE
NRBC BLD AUTO-RTO: 0 /100 WBCS
O2 CT BLDA-SCNC: 16.7 ML/DL (ref 16–23)
OXYHGB MFR BLDA: 96 % (ref 94–97)
PCO2 BLDA: 68.9 MM HG (ref 36–44)
PH BLDA: 7.37 [PH] (ref 7.35–7.45)
PH UR STRIP.AUTO: 8 [PH]
PLATELET # BLD AUTO: 144 THOUSANDS/UL (ref 149–390)
PMV BLD AUTO: 10.4 FL (ref 8.9–12.7)
PO2 BLDA: 91.2 MM HG (ref 75–129)
POTASSIUM SERPL-SCNC: 4.4 MMOL/L (ref 3.5–5.3)
PROT SERPL-MCNC: 7.6 G/DL (ref 6.4–8.4)
PROT UR STRIP-MCNC: NEGATIVE MG/DL
RBC # BLD AUTO: 3.7 MILLION/UL (ref 3.88–5.62)
RSV RNA RESP QL NAA+PROBE: NEGATIVE
SARS-COV-2 RNA RESP QL NAA+PROBE: NEGATIVE
SODIUM SERPL-SCNC: 145 MMOL/L (ref 135–147)
SP GR UR STRIP.AUTO: 1.01 (ref 1–1.04)
SPECIMEN SOURCE: ABNORMAL
UROBILINOGEN UA: NEGATIVE MG/DL
WBC # BLD AUTO: 5.12 THOUSAND/UL (ref 4.31–10.16)

## 2024-02-21 PROCEDURE — 81003 URINALYSIS AUTO W/O SCOPE: CPT

## 2024-02-21 PROCEDURE — 99284 EMERGENCY DEPT VISIT MOD MDM: CPT

## 2024-02-21 PROCEDURE — 82805 BLOOD GASES W/O2 SATURATION: CPT

## 2024-02-21 PROCEDURE — 70450 CT HEAD/BRAIN W/O DYE: CPT

## 2024-02-21 PROCEDURE — 71045 X-RAY EXAM CHEST 1 VIEW: CPT

## 2024-02-21 PROCEDURE — 85025 COMPLETE CBC W/AUTO DIFF WBC: CPT

## 2024-02-21 PROCEDURE — 99285 EMERGENCY DEPT VISIT HI MDM: CPT

## 2024-02-21 PROCEDURE — 36415 COLL VENOUS BLD VENIPUNCTURE: CPT

## 2024-02-21 PROCEDURE — G1004 CDSM NDSC: HCPCS

## 2024-02-21 PROCEDURE — 93005 ELECTROCARDIOGRAM TRACING: CPT

## 2024-02-21 PROCEDURE — 0241U HB NFCT DS VIR RESP RNA 4 TRGT: CPT

## 2024-02-21 PROCEDURE — 36600 WITHDRAWAL OF ARTERIAL BLOOD: CPT

## 2024-02-21 PROCEDURE — 80053 COMPREHEN METABOLIC PANEL: CPT

## 2024-02-21 NOTE — Clinical Note
Severiano Feliciano was seen and treated in our emergency department on 2/21/2024.    No restrictions            Diagnosis:     Severiano  .    He may return on this date: 02/22/2024         If you have any questions or concerns, please don't hesitate to call.      Justino Phillips PA-C    ______________________________           _______________          _______________  Hospital Representative                              Date                                Time

## 2024-02-22 ENCOUNTER — CLINICAL SUPPORT (OUTPATIENT)
Dept: PULMONOLOGY | Facility: CLINIC | Age: 89
End: 2024-02-22
Payer: MEDICARE

## 2024-02-22 DIAGNOSIS — J44.9 CHRONIC OBSTRUCTIVE PULMONARY DISEASE, UNSPECIFIED COPD TYPE (HCC): Primary | ICD-10-CM

## 2024-02-22 LAB
ATRIAL RATE: 74 BPM
P AXIS: 73 DEGREES
PR INTERVAL: 160 MS
QRS AXIS: 59 DEGREES
QRSD INTERVAL: 88 MS
QT INTERVAL: 388 MS
QTC INTERVAL: 430 MS
T WAVE AXIS: 65 DEGREES
VENTRICULAR RATE: 74 BPM

## 2024-02-22 PROCEDURE — G0239 OTH RESP PROC, GROUP: HCPCS

## 2024-02-22 NOTE — ED PROVIDER NOTES
History  Chief Complaint   Patient presents with    Altered Mental Status     Pt brought in by EMS. Family called them requesting pt be taken to hospital, has had an increase in confusion today (hx of dementia). Also had several episodes of hypoxia (family reports oximetry in the 80s). Pt not answering staff questions.     Patient is a 88 year old male coming in for evaluation of confusion while at home. Arrives by way of EMS. [place]ralt[place] speaking only. Not answering questions. No sign of trauma, moving limbs with no obvious deficits       Altered Mental Status      Prior to Admission Medications   Prescriptions Last Dose Informant Patient Reported? Taking?   acetaZOLAMIDE (DIAMOX) 125 mg tablet   No No   Sig: Take 1 tablet (125 mg total) by mouth in the morning   brimonidine tartrate 0.2 % ophthalmic solution   Yes No   Sig: INSTILL 1 DROP IN LEFT EYE TWICE DAILY INSTILL 1 DROP IN LEFT EYE DOS VECES AL EULALIO   budesonide (Pulmicort) 0.5 mg/2 mL nebulizer solution   No No   Sig: Take 2 mL (0.5 mg total) by nebulization 2 (two) times a day Rinse mouth after use.   formoterol (PERFOROMIST) 20 MCG/2ML nebulizer solution   No No   Sig: Take 2 mL (20 mcg total) by nebulization 2 (two) times a day   ipratropium-albuterol (DUO-NEB) 0.5-2.5 mg/3 mL nebulizer solution   No No   Sig: Take 3 mL by nebulization 2 (two) times a day   ketoconazole (NIZORAL) 2 % shampoo   No No   Sig: Apply 1 Application topically 2 (two) times a week   losartan (COZAAR) 25 mg tablet   No No   Sig: Take 2 tablets (50 mg total) by mouth 2 (two) times a day   mometasone (ELOCON) 0.1 % lotion   No No   Sig: Apply topically daily   pantoprazole (PROTONIX) 40 mg tablet   No No   Sig: Take 1 tablet (40 mg total) by mouth daily   predniSONE 5 mg tablet   No No   Sig: Take 1 tablet (5 mg total) by mouth daily      Facility-Administered Medications: None       Past Medical History:   Diagnosis Date    Acute metabolic encephalopathy 06/13/2020    Age-related  cataract of right eye 04/04/2023    patient is medically cleared and presents with low risk of complication with this upcoming R cataract surgery.    Anemia     Aneurysm, aorta, thoracic (HCC)     Appendicolith     Ascending aortic aneurysm (HCC)     3.7    Asthma     BPH (benign prostatic hyperplasia)     CAD (coronary artery disease)     noted on CT scan    CHF (congestive heart failure) (HCC)     COPD (chronic obstructive pulmonary disease) (HCC)     Descending thoracic aortic aneurysm (HCC)     Diabetes mellitus (HCC)     Diverticulosis     Former tobacco use     GERD (gastroesophageal reflux disease)     History of DVT (deep vein thrombosis)     Left leg    History of transfusion     Hypertension     Hypoxemia 04/30/2023    Inguinal hernia     right    Nephrolithiasis     Oxygen dependent     2LNC    Oxygen dependent     Pneumonia     Pre-diabetes     Prostate calculus     PVD (peripheral vascular disease) (HCC)     Recurrent right inguinal hernia w incarcertion 01/04/2023    Thoracic aortic aneurysm without rupture (HCC) 11/19/2018    Added automatically from request for surgery [id number]    Thrombocytopenia (HCC) 12/29/2018    Ulcer     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)     Varicose vein of leg     b/l       Past Surgical History:   Procedure Laterality Date    CARDIAC SURGERY      ESOPHAGOGASTRODUODENOSCOPY      HERNIA REPAIR Right 1/21/2019    Procedure: REPAIR HERNIA INGUINAL WITH MESH;  Surgeon: David Lowry MD;  Location:  MAIN OR;  Service: General    HERNIA REPAIR Right 7/22/2023    Procedure: REPAIR RECURRENT INCARERATED HERNIA INGUINAL OPEN, RIGHT ORCHIECTOMY;  Surgeon: Mason Bertrand MD;  Location: [facility];  Service: General    INGUINAL HERNIA REPAIR Bilateral     IR TEVAR  12/27/2018    MD EVASC RPR DTA COVERAGE ART ORIGIN 1ST ENDOPROSTH N/A 12/27/2018    Procedure: TEVAR - endovascular thoracic aortic aneurysm repair;  Surgeon: Gladis Ortega MD;  Location: [facility];   Service: Vascular    THORACIC AORTIC ANEURYSM REPAIR  2018    VARICOSE VEIN SURGERY Bilateral     vein stripping       Family History   Problem Relation Age of Onset    Tuberculosis Mother     No Known Problems Father     Cancer Sister     Diabetes Family     Hypertension Family      I have reviewed and agree with the history as documented.    E-Cigarette/Vaping    E-Cigarette Use Never User      E-Cigarette/Vaping Substances    Nicotine No     THC No     CBD No     Flavoring No     Other No     Unknown No      Social History     Tobacco Use    Smoking status: Former     Current packs/day: 0.00     Average packs/day: 1 pack/day for 35.0 years (35.0 ttl pk-yrs)     Types: Cigarettes     Start date:      Quit date:      Years since quittin.1    Smokeless tobacco: Never   Vaping Use    Vaping status: Never Used   Substance Use Topics    Alcohol use: Never    Drug use: No       Review of Systems   Unable to perform ROS: Mental status change       Physical Exam  Physical Exam  Vitals reviewed.   Constitutional:       Appearance: Normal appearance. He is normal weight.   HENT:      Head: Normocephalic and atraumatic.      Right Ear: External ear normal.      Left Ear: External ear normal.      Nose: Nose normal.   Eyes:      Conjunctiva/sclera: Conjunctivae normal.   Cardiovascular:      Rate and Rhythm: Normal rate.   Pulmonary:      Effort: Pulmonary effort is normal.   Abdominal:      Palpations: Abdomen is soft.      Tenderness: There is no abdominal tenderness. There is no guarding.   Musculoskeletal:         General: Normal range of motion.      Cervical back: Normal range of motion.   Skin:     General: Skin is warm and dry.   Neurological:      Mental Status: He is alert.         Vital Signs  ED Triage Vitals   Temperature Pulse Respirations Blood Pressure SpO2   24 1830 24 1830 24 1831 24 1834 24 1830   98.2 °F (36.8 °C) 71 18 (!) 183/102 98 %      Temp Source  Heart Rate Source Patient Position - Orthostatic VS BP Location FiO2 (%)   02/21/24 1830 02/21/24 1830 02/21/24 1831 02/21/24 1831 --   Oral Monitor Sitting Left arm       Pain Score       02/21/24 1907       No Pain           Vitals:    02/21/24 1830 02/21/24 1831 02/21/24 1834 02/21/24 1907   BP:   (!) 183/102 (!) 174/89   Pulse: 71   80   Patient Position - Orthostatic VS:  Sitting           Visual Acuity      ED Medications  Medications - No data to display    Diagnostic Studies  Results Reviewed       Procedure Component Value Units Date/Time    Blood gas, arterial [[id number]]  (Abnormal) Collected: 02/21/24 2118    Lab Status: Final result Specimen: Blood, Arterial from Radial, Left Updated: 02/21/24 2130     pH, Arterial 7.371     pCO2, Arterial 68.9 mm Hg      pO2, Arterial 91.2 mm Hg      HCO3, Arterial 39.0 mmol/L      Base Excess, Arterial 11.2 mmol/L      O2 Content, Arterial 16.7 mL/dL      O2 HGB,Arterial  96.0 %      SOURCE Radial, Left     AVINASH TEST Yes     Nasal Cannula 3    Comprehensive metabolic panel [[id number]]  (Abnormal) Collected: 02/21/24 2010    Lab Status: Final result Specimen: Blood from Arm, Left Updated: 02/21/24 2050     Sodium 145 mmol/L      Potassium 4.4 mmol/L      Chloride 94 mmol/L      CO2 >45 mmol/L      ANION GAP --     BUN 18 mg/dL      Creatinine 0.79 mg/dL      Glucose 137 mg/dL      Calcium 9.9 mg/dL      AST 21 U/L      ALT 12 U/L      Alkaline Phosphatase 73 U/L      Total Protein 7.6 g/dL      Albumin 4.1 g/dL      Total Bilirubin 0.38 mg/dL      eGFR 81 ml/min/1.73sq m     Narrative:      National Kidney Disease Foundation guidelines for Chronic Kidney Disease (CKD):     Stage 1 with normal or high GFR (GFR > 90 mL/min/1.73 square meters)    Stage 2 Mild CKD (GFR = 60-89 mL/min/1.73 square meters)    Stage 3A Moderate CKD (GFR = 45-59 mL/min/1.73 square meters)    Stage 3B Moderate CKD (GFR = 30-44 mL/min/1.73 square meters)    Stage 4 Severe CKD (GFR = 15-29  mL/min/1.73 square meters)    Stage 5 End Stage CKD (GFR <15 mL/min/1.73 square meters)  Note: GFR calculation is accurate only with a steady state creatinine    UA w Reflex to Microscopic w Reflex to Culture [[id number]]  (Normal) Collected: 02/21/24 2010    Lab Status: Final result Specimen: Urine, Clean Catch Updated: 02/21/24 2039     Color, UA Straw     Clarity, UA Clear     Specific Gravity, UA 1.015     pH, UA 8.0     Leukocytes, UA Negative     Nitrite, UA Negative     Protein, UA Negative mg/dl      Glucose, UA Negative mg/dl      Ketones, UA Negative mg/dl      Bilirubin, UA Negative     Occult Blood, UA Negative     UROBILINOGEN UA Negative mg/dL     FLU/RSV/COVID - if FLU/RSV clinically relevant [[id number]]  (Normal) Collected: 02/21/24 1848    Lab Status: Final result Specimen: Nares from Nose Updated: 02/21/24 1950     SARS-CoV-2 Negative     INFLUENZA A PCR Negative     INFLUENZA B PCR Negative     RSV PCR Negative    Narrative:      FOR PEDIATRIC PATIENTS - copy/paste COVID Guidelines URL to browser: [email or web address]    SARS-CoV-2 assay is a Nucleic Acid Amplification assay intended for the  qualitative detection of nucleic acid from SARS-CoV-2 in nasopharyngeal  swabs. Results are for the presumptive identification of SARS-CoV-2 RNA.    Positive results are indicative of infection with SARS-CoV-2, the virus  causing COVID-19, but do not rule out bacterial infection or co-infection  with other viruses. Laboratories within the [place] and its  territories are required to report all positive results to the appropriate  public health authorities. Negative results do not preclude SARS-CoV-2  infection and should not be used as the sole basis for treatment or other  patient management decisions. Negative results must be combined with  clinical observations, patient history, and epidemiological information.  This test has not been FDA cleared  or approved.    This test has been authorized by FDA under an Emergency Use Authorization  (EUA). This test is only authorized for the duration of time the  declaration that circumstances exist justifying the authorization of the  emergency use of an in vitro diagnostic tests for detection of SARS-CoV-2  virus and/or diagnosis of COVID-19 infection under section 564(b)(1) of  the Act, 21 U.S.C. 360bbb-3(b)(1), unless the authorization is terminated  or revoked sooner. The test has been validated but independent review by FDA  and CLIA is pending.    Test performed using [employer] GeneXpert: This RT-PCR assay targets N2,  a region unique to SARS-CoV-2. A conserved region in the E-gene was chosen  for pan-Sarbecovirus detection which includes SARS-CoV-2.    According to CMS-2020-01-R, this platform meets the definition of high-throughput technology.    CBC and differential [[id number]]  (Abnormal) Collected: 02/21/24 1848    Lab Status: Final result Specimen: Blood from Arm, Right Updated: 02/21/24 1908     WBC 5.12 Thousand/uL      RBC 3.70 Million/uL      Hemoglobin 12.0 g/dL      Hematocrit 40.0 %       fL      MCH 32.4 pg      MCHC 30.0 g/dL      RDW 12.4 %      MPV 10.4 fL      Platelets 144 Thousands/uL      nRBC 0 /100 WBCs      Neutrophils Relative 63 %      Immat GRANS % 0 %      Lymphocytes Relative 22 %      Monocytes Relative 7 %      Eosinophils Relative 7 %      Basophils Relative 1 %      Neutrophils Absolute 3.26 Thousands/µL      Immature Grans Absolute 0.02 Thousand/uL      Lymphocytes Absolute 1.10 Thousands/µL      Monocytes Absolute 0.37 Thousand/µL      Eosinophils Absolute 0.34 Thousand/µL      Basophils Absolute 0.03 Thousands/µL                    CT head without contrast   Final Result by Paul Barnes MD (02/21 2044)         1. No acute intracranial hemorrhage, mass effect or edema.   2. 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   3. 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   4. Interval development of bilateral mastoid air cell effusions.                  Workstation performed: [id number]         XR chest 1 view portable    (Results Pending)              Procedures  Procedures         ED Course  ED Course as of 02/21/24 2206 Wed Feb 21, 2024 2047 CT head without contrast    1. No acute intracranial hemorrhage, mass effect or edema.  2. 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  3. 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  4. Interval development of bilateral mastoid air cell effusions.     2050 Carbon Dioxide(!!): >45                                             Medical Decision Making  Patient comes in for evaluation of AMS. Spoke to wife, with daughter translating over the phone. Patient is speaking to wife. Reassured that ct head shows no acute intracranial patholgy. CO2 appears to be his baseline. Confusion today was likely due to underlying dementia. Family feels comfortable taking him home. Discussed this patient with attending, who is in agreement with plan    Amount and/or Complexity of Data Reviewed  Labs: ordered. Decision-making details documented in ED Course.  Radiology: ordered. Decision-making details documented in ED Course.             Disposition  Final diagnoses:   Altered mental status, unspecified altered mental status type     Time reflects when diagnosis was documented in both MDM as applicable and the Disposition within this note       Time User Action Codes Description Comment    2/21/2024  9:54 PM Justino Phillips Add [R41.82] Altered  mental status, unspecified altered mental status type           ED Disposition       ED Disposition   Discharge    Condition   Stable    Date/Time   Wed Feb 21, 2024  9:54 PM    Comment   Severiano Feliciano discharge to home/self care.                   Follow-up Information       Follow up With Specialties Details Why Contact Info Additional Information    Kirby Casanova MD Family Medicine   [address] [place] [id number]  [phone]       On license of [facility] Emergency Medicine  As needed, If symptoms worsen [address] [facility] [id number] [phone] On license of [facility]            Patient's Medications   Discharge Prescriptions    No medications on file       No discharge procedures on file.    PDMP Review         Value Time User    PDMP Reviewed  Yes 1/4/2024  9:05 AM Jennifer Paige Bloch, CRNP            ED Provider  Electronically Signed by             Justino Phillips PA-C  02/21/24 7622

## 2024-02-26 ENCOUNTER — TELEPHONE (OUTPATIENT)
Dept: NEUROSURGERY | Facility: CLINIC | Age: 89
End: 2024-02-26

## 2024-02-26 NOTE — TELEPHONE ENCOUNTER
Pt no showed cta 2/21/24 I called nad spoke with spouse bon she will rescheule before his apt with us for march she was advised he needs this before apt - or apt will have to be rescheuled -HARMONY richardson was adviaed and will also call -harmony

## 2024-02-26 NOTE — TELEPHONE ENCOUNTER
2/2624 Patient was scheduled on 2/21/24 for a CTA HEAD he no Showed called and spoke to his wife Viridiana she stated on 2/26/24 she is scheduling CTA HEAD.

## 2024-02-27 ENCOUNTER — CLINICAL SUPPORT (OUTPATIENT)
Dept: PULMONOLOGY | Facility: CLINIC | Age: 89
End: 2024-02-27
Payer: MEDICARE

## 2024-02-27 DIAGNOSIS — J44.9 CHRONIC OBSTRUCTIVE PULMONARY DISEASE, UNSPECIFIED COPD TYPE (HCC): Primary | ICD-10-CM

## 2024-02-27 PROCEDURE — G0239 OTH RESP PROC, GROUP: HCPCS

## 2024-02-28 ENCOUNTER — TELEPHONE (OUTPATIENT)
Dept: NEUROSURGERY | Facility: CLINIC | Age: 89
End: 2024-02-28

## 2024-02-28 NOTE — TELEPHONE ENCOUNTER
Spoke with dmlida spouse she never rescheueld his cta and his apt with us is tomorrow she advised sami in [place] he is having problems with his heart and will call back I did tell her he needs to have cta rescheuled then call us abck for our f/u +BA

## 2024-02-29 ENCOUNTER — CLINICAL SUPPORT (OUTPATIENT)
Dept: PULMONOLOGY | Facility: CLINIC | Age: 89
End: 2024-02-29
Payer: MEDICARE

## 2024-02-29 DIAGNOSIS — J44.9 CHRONIC OBSTRUCTIVE PULMONARY DISEASE, UNSPECIFIED COPD TYPE (HCC): Primary | ICD-10-CM

## 2024-02-29 PROCEDURE — G0239 OTH RESP PROC, GROUP: HCPCS

## 2024-03-04 DIAGNOSIS — I10 BENIGN ESSENTIAL HYPERTENSION: ICD-10-CM

## 2024-03-04 RX ORDER — LOSARTAN POTASSIUM 25 MG/1
25 TABLET ORAL 2 TIMES DAILY
Qty: 180 TABLET | Refills: 0 | Status: SHIPPED | OUTPATIENT
Start: 2024-03-04 | End: 2024-03-06

## 2024-03-05 ENCOUNTER — CLINICAL SUPPORT (OUTPATIENT)
Dept: PULMONOLOGY | Facility: CLINIC | Age: 89
End: 2024-03-05
Payer: MEDICARE

## 2024-03-05 DIAGNOSIS — J44.9 CHRONIC OBSTRUCTIVE PULMONARY DISEASE, UNSPECIFIED COPD TYPE (HCC): Primary | ICD-10-CM

## 2024-03-05 PROCEDURE — G0239 OTH RESP PROC, GROUP: HCPCS

## 2024-03-06 ENCOUNTER — HOSPITAL ENCOUNTER (OUTPATIENT)
Dept: PULMONOLOGY | Facility: HOSPITAL | Age: 89
Discharge: HOME | End: 2024-03-06
Attending: INTERNAL MEDICINE
Payer: MEDICARE

## 2024-03-06 DIAGNOSIS — J44.9 CHRONIC OBSTRUCTIVE PULMONARY DISEASE, UNSPECIFIED COPD TYPE (HCC): ICD-10-CM

## 2024-03-06 DIAGNOSIS — I10 BENIGN ESSENTIAL HYPERTENSION: ICD-10-CM

## 2024-03-06 LAB
ARTERIAL PATENCY WRIST A: YES
BASE EXCESS BLDA CALC-SCNC: 8.8 MMOL/L
HCO3 BLDA-SCNC: 37.4 MMOL/L (ref 22–28)
NASAL CANNULA: 3
O2 CT BLDA-SCNC: 15.2 ML/DL (ref 16–23)
OXYHGB MFR BLDA: 86 % (ref 94–97)
PCO2 BLDA: 73.6 MM HG (ref 36–44)
PH BLDA: 7.32 [PH] (ref 7.35–7.45)
PO2 BLDA: 56 MM HG (ref 75–129)
SPECIMEN SOURCE: ABNORMAL

## 2024-03-06 PROCEDURE — 94010 BREATHING CAPACITY TEST: CPT

## 2024-03-06 PROCEDURE — 36600 WITHDRAWAL OF ARTERIAL BLOOD: CPT

## 2024-03-06 PROCEDURE — 94760 N-INVAS EAR/PLS OXIMETRY 1: CPT

## 2024-03-06 PROCEDURE — 94010 BREATHING CAPACITY TEST: CPT | Performed by: INTERNAL MEDICINE

## 2024-03-06 PROCEDURE — 82805 BLOOD GASES W/O2 SATURATION: CPT | Performed by: INTERNAL MEDICINE

## 2024-03-06 RX ORDER — ALBUTEROL SULFATE 2.5 MG/3ML
2.5 SOLUTION RESPIRATORY (INHALATION) ONCE AS NEEDED
Status: DISCONTINUED | OUTPATIENT
Start: 2024-03-06 | End: 2024-03-10 | Stop reason: HOSPADM

## 2024-03-06 RX ORDER — LOSARTAN POTASSIUM 25 MG/1
TABLET ORAL
Qty: 180 TABLET | Refills: 0 | Status: SHIPPED | OUTPATIENT
Start: 2024-03-06

## 2024-03-07 ENCOUNTER — CLINICAL SUPPORT (OUTPATIENT)
Dept: PULMONOLOGY | Facility: CLINIC | Age: 89
End: 2024-03-07
Payer: MEDICARE

## 2024-03-07 DIAGNOSIS — J44.9 CHRONIC OBSTRUCTIVE PULMONARY DISEASE, UNSPECIFIED COPD TYPE (HCC): Primary | ICD-10-CM

## 2024-03-07 PROCEDURE — G0239 OTH RESP PROC, GROUP: HCPCS

## 2024-03-12 ENCOUNTER — CLINICAL SUPPORT (OUTPATIENT)
Dept: PULMONOLOGY | Facility: CLINIC | Age: 89
End: 2024-03-12
Payer: MEDICARE

## 2024-03-12 ENCOUNTER — TELEPHONE (OUTPATIENT)
Dept: NEUROSURGERY | Facility: CLINIC | Age: 89
End: 2024-03-12

## 2024-03-12 DIAGNOSIS — J44.9 CHRONIC OBSTRUCTIVE PULMONARY DISEASE, UNSPECIFIED COPD TYPE (HCC): Primary | ICD-10-CM

## 2024-03-12 PROCEDURE — G0239 OTH RESP PROC, GROUP: HCPCS

## 2024-03-12 NOTE — TELEPHONE ENCOUNTER
Spoke with spouse bon again she will rescheule the cta from his no show from feb Ig[place] her central scheduling dept number she will call us back  for our f/u apt after -ba

## 2024-03-12 NOTE — PROGRESS NOTES
Pulmonary Rehabilitation Plan of Care   30 DAY      Today's date: 3/12/2024   # of Exercise Sessions Completed: 8  Patient name: Severiano Feliciano      : 1935  Age: 88 y.o.       MRN: [id number]  Referring Physician: Buddy Hayward MD  Pulmonologist: Buddy Hayward MD    Provider: [place]  Clinician: Erin Gabriel MS, CEP    Dx:    Encounter Diagnosis   Name Primary?    Chronic obstructive pulmonary disease, unspecified COPD type (HCC) Yes     Date of onset: 2024      SUMMARY OF PROGRESS: This is Severiano's 30 day note in pulmonary rehab. He has been attending 2x/week and completed 8 sessions so far. Communication is difficult with Severiano, not only with the language barrier but also due to severe hearing loss and he often does not wear his hearing aids. His son Daljit was not available today for translation and he does not understand [employer] interpretation services.   Severiano has been tolerating 32-38 mins of exercise at 2.2-2.7 METs on 3LO2. Resting HR 65-75bpm, increasing to 80-99bpm during exercise. Resting //80 with occ elevation at rest 148//88. Patient's family reports 100% medication compliance. Resting SpO2 90-96% with slight decrease to 88-95% during exercise. Severiano does not understand the [place] Dyspnea chart but it seems he experiences 3/10 dyspnea with exercise. Last week, his son Daljit reports that Severiano has been able to get around the house more with ease. He was encouraged to increase walking with Severiano at home and encourage him to complete simple tasks on his own but with supervision. He has been doing room walking in rehab, with his cane, and is very unsteady on his feet. Will increase frequency over the next 30 days. Severiano has lost 3 lbs since his initial evaluation and his family is in charge of his meals at home. No concern of anxiety or depression at this time. Severiano will continue to be progressed over the next 30 days to  reach his goals.   Patient specific goals include: become more independent and move more at home.    Medication compliance: Yes   Comments: Pt reports to be compliant with medications    Fall Risk: High   Comments: Patient uses walking assist device (walker/cane/rollator) and Denies a fall in the past 6 months    Smoking: Former user  Quit 20 years ago    Pulmonary Disease Risk Factors:  Occupational exposure to workplace  fumes    RPD at Rest: 0-1/10  RPD with Exercise:  0-4/10    Assessment of progression of lung disease and functional status:  CAT: not assessed  Shortness of breath questionnaire: 54/120      EXERCISE ASSESSMENT and PLAN    Current Exercise Program in Rehab:       Frequency: 2 days/week   Supplement with home exercise 2+ days/wk as tolerated        Minutes: 32-38         METS: 2.2-2.7              SpO2: 88-95%              RPD: 0-4                      HR: 80-99   RPE: 4-5         Modalities: UBE, NuStep, and Room walking      Exercise Progression 30 Day Goals :    Frequency: 2 days/week    Supplement with home exercise 2+ days/wk as tolerated       Minutes: 40        METS: 2.0-3.0              SpO2: >88%              RPD: 4-6                      HR:    RPE: 4-5        Modalities: UBE, NuStep, Recumbent bike, and Room walking     Strength trainin-3 days / week  12-15 repetitions  1-2 sets per modality   Will be added following 2-3 weeks of monitored exercise sessions   Modalities: Arm Curl, Sit to Stands, Front Raises, Shoulder Shrugs, and Calf Raises    Home Exercise: none    Education: pursed lipped breathing, fall prevention while using nasal canula tubing, relaxation breathing, benefits of exercise for pulmonary disease, RPD scale, O2 saturation monitoring, and appropriate O2 response to exercise    SMART Goals:   improved 6MWT distance, reduced dyspnea during exercise, improved exercise tolerance based on peak METs tolerated in pulmonary rehab exercise session, and SpO2 >88%  during exercise    Patient Specific EXERCISE GOALS:   (home exercise, ADLs): reduced dependence on supplemental O2, attend pulmonary rehab regularly, decrease sitting time at home, start a walking program, reduced pulmonary related hospital readmissions , increased muscular strength, increased energy, increased stamina with ADLs, and more independence at home    Patient's progress toward SMART and personal goals: Has been compliant with attending pulmonary rehab 2x/week; son reports pt being able to get around the house more with ease.     Patient's goals for next 30 days: continue to attend pulmonary rehab 2x/week; increase walking frequency in rehab and start short walks at home with supervision    Plan: Titrate supplemental oxygen as needed to maintain SpO2>88% with exercise, reduce time sitting at home, and utilize PLB with physical activity    Readiness to change: Action:  (Changing behavior)      NUTRITION ASSESSMENT AND PLAN    Weight control:    Starting weight: 137 lbs   Current weight: 134 lbs    Diabetes: N/A    SMART Goals:   eat 6 or more servings of grain products per day, eat 5 or more servings of fruits and vegetables a day, rarely eat processed meats or eat low fat processed meats, Do not eat fried foods, choose healthy snacks such as fruit, pretzels, and low fat crackers, choose healthy desserts and sweets such as patrice food cake or  fruit, choose low sugar desserts and sweets, and drink less than 8oz of soda and sweetened drinks per day      Patient Specific NUTRITION GOALS:  (wt control, diabetes management, dietary modifications): improve hydration increased muscle mass    Patient's progress toward SMART and personal goals: Patient has lost 3 lbs without intention    Patient's goals for next 30 days: reduce sweets and increase healthy fats for healthy weight gain and muscle mass      Measurable goals were based Rate Your Plate Dietary Self-Assessment. These are the areas in which the patient  could score higher on the assessment.  Goals include recommendations for a heart healthy diet based on American Heart Association.    Education: heart healthy eating principles    Plan: group Class: Heart Healthy Eating, replace refined flours with whole grains, increase daily intake of fruits and vegetables, increase daily intake of low fat dairy, eat healthy snacks like fruit, pretzels, and low fat crackers, and choose desserts such as fruit, patrice food cake, low-fat or fat-free sweets    Readiness to change: Preparation:  (Getting ready to change)       PSYCHOSOCIAL ASSESSMENT AND PLAN    Emotional:  Depression assessment:  PHQ-9 = 9  5-9 = Mild Depression            Anxiety measure:  LILA-7 = 6  5-9 = Mild anxiety    Assessment of depression and anxiety    Patient's son denies anxiety and depression on patient's behalf    Self-reported stress level:  2  Stress Management: Practice Relaxation Techniques, Exercise, and Enjoy a hobby    Patient's rating of Social support: Very Good   Social Support Network: children, grandchildren, and spouse    Psychosocial Assessment as it relates to rehabilitation: Patient denies issues with his/her family or home life that may affect their rehabilitation efforts.       SMART Goals:    PHQ-9 - reduced severity by one level, Physical Fitness in [place] Score < 3, Daily Activity in [facility] Score < 3, Pain in [place] Score < 3, Overall Health in [facility] Score < 3, Quality of Life in [facility] Score < 3 , Change in Health in [facility] Score < 3 ,  Increased interest and pleasure in doing things, and feel less tired with more energy    Patient Specific PSYCHOCOSOCIAL GOALS:  (stress, emotional well being, social support): spend time with family    Patient's progress toward SMART and personal goals: attends pulmonary rehab 2x/week and enjoys coming    Patient's goals for next 30 days: Be more active and increase energy level in return      Education: benefits of a positive  support system, stress management techniques, and depression and pulmonary disease    Plan: Exercise, Reduce dependence  on family, Return to previous social activity, and Avoid triggers    Readiness to change: Action:  (Changing behavior)      OTHER CORE COMPONENTS     Tobacco:   Social History     Tobacco Use   Smoking Status Former    Current packs/day: 0.00    Average packs/day: 1 pack/day for 35.0 years (35.0 ttl pk-yrs)    Types: Cigarettes    Start date:     Quit date:     Years since quittin.2   Smokeless Tobacco Never       Tobacco Use Intervention: Referral to tobacco expert:   Pt quit 20 years ago   and has abstained    Blood pressure:    Restin//88    Oxygen needs:   Rest:  supplemental O2 via nasal cannula @ 3L/min   Exercise/physical activity:  supplemental O2 via nasal cannula @ 3-4L/min   Sleep:   supplemental O2 via nasal cannula @ 3L/min     Oxygen Goal: Maintain SpO2>88% during exercise    SMART Goals: reduced dietary sodium <2000mg and medication compliance    Patient Specific CORE COMPONENT GOALS (smoking, BP control, angina control, medication compliance): Take inhalers properly to ensure he is receiving the medication    Patient's progress toward SMART and personal goals: has maintained SpO2 >88% during exercise on 3LO2; Occ elevated resting BP    Patient's goals for next 30 days: Continue to utilize supplemental O2 at 3L with exercise and increase to 4L to maintain SpO2 >88%; ensure 100% medications compliance.     Education:  pathophysiology of pulmonary disease, inspiratory muscle training, and education: Pulmonary Anatomy and Physiology    Plan: medication compliance, engage in regular exercise, and monitor home BP    Readiness to change: Action:  (Changing behavior)

## 2024-03-14 ENCOUNTER — CLINICAL SUPPORT (OUTPATIENT)
Dept: PULMONOLOGY | Facility: CLINIC | Age: 89
End: 2024-03-14
Payer: MEDICARE

## 2024-03-14 DIAGNOSIS — J44.9 CHRONIC OBSTRUCTIVE PULMONARY DISEASE, UNSPECIFIED COPD TYPE (HCC): Primary | ICD-10-CM

## 2024-03-14 PROCEDURE — G0239 OTH RESP PROC, GROUP: HCPCS

## 2024-03-19 ENCOUNTER — CLINICAL SUPPORT (OUTPATIENT)
Dept: PULMONOLOGY | Facility: CLINIC | Age: 89
End: 2024-03-19
Payer: MEDICARE

## 2024-03-19 DIAGNOSIS — J44.9 CHRONIC OBSTRUCTIVE PULMONARY DISEASE, UNSPECIFIED COPD TYPE (HCC): Primary | ICD-10-CM

## 2024-03-19 PROCEDURE — G0239 OTH RESP PROC, GROUP: HCPCS

## 2024-03-21 ENCOUNTER — CLINICAL SUPPORT (OUTPATIENT)
Dept: PULMONOLOGY | Facility: CLINIC | Age: 89
End: 2024-03-21
Payer: MEDICARE

## 2024-03-21 DIAGNOSIS — J44.9 CHRONIC OBSTRUCTIVE PULMONARY DISEASE, UNSPECIFIED COPD TYPE (HCC): Primary | ICD-10-CM

## 2024-03-21 PROCEDURE — G0239 OTH RESP PROC, GROUP: HCPCS

## 2024-03-25 ENCOUNTER — OFFICE VISIT (OUTPATIENT)
Dept: FAMILY MEDICINE CLINIC | Facility: CLINIC | Age: 89
End: 2024-03-25
Payer: MEDICARE

## 2024-03-25 VITALS
DIASTOLIC BLOOD PRESSURE: 80 MMHG | OXYGEN SATURATION: 96 % | HEART RATE: 78 BPM | BODY MASS INDEX: 23.57 KG/M2 | WEIGHT: 133 LBS | HEIGHT: 63 IN | TEMPERATURE: 97.8 F | RESPIRATION RATE: 20 BRPM | SYSTOLIC BLOOD PRESSURE: 140 MMHG

## 2024-03-25 DIAGNOSIS — J41.1 MUCOPURULENT CHRONIC BRONCHITIS (HCC): Primary | ICD-10-CM

## 2024-03-25 DIAGNOSIS — G89.29 CHRONIC BILATERAL LOW BACK PAIN WITHOUT SCIATICA: ICD-10-CM

## 2024-03-25 DIAGNOSIS — I10 BENIGN ESSENTIAL HYPERTENSION: ICD-10-CM

## 2024-03-25 DIAGNOSIS — K59.04 CHRONIC IDIOPATHIC CONSTIPATION: ICD-10-CM

## 2024-03-25 DIAGNOSIS — M54.50 CHRONIC BILATERAL LOW BACK PAIN WITHOUT SCIATICA: ICD-10-CM

## 2024-03-25 PROCEDURE — 99214 OFFICE O/P EST MOD 30 MIN: CPT | Performed by: FAMILY MEDICINE

## 2024-03-25 PROCEDURE — G2211 COMPLEX E/M VISIT ADD ON: HCPCS | Performed by: FAMILY MEDICINE

## 2024-03-25 RX ORDER — AMOXICILLIN 250 MG
1 CAPSULE ORAL DAILY
Qty: 30 TABLET | Refills: 5 | Status: SHIPPED | OUTPATIENT
Start: 2024-03-25

## 2024-03-25 RX ORDER — SENNOSIDES 8.6 MG
650 CAPSULE ORAL EVERY 8 HOURS PRN
Qty: 90 TABLET | Refills: 5 | Status: SHIPPED | OUTPATIENT
Start: 2024-03-25

## 2024-03-25 RX ORDER — ALBUTEROL SULFATE 90 UG/1
2 AEROSOL, METERED RESPIRATORY (INHALATION) EVERY 6 HOURS PRN
COMMUNITY
Start: 2024-03-22 | End: 2024-03-25 | Stop reason: ALTCHOICE

## 2024-03-25 NOTE — PROGRESS NOTES
Name: Severiano Feliciano      : 1935      MRN: [id number]  Encounter Provider: Kirby Casanova MD  Encounter Date: 3/25/2024   Encounter department: [facility]     Chief Complaint  Follow-up for chronic obstructive pulmonary disease (COPD) and hypertension (HTN).    History of Present Illness  The patient presents for a routine follow-up of COPD and HTN. Reports general improvement in mobility and daily activities. Compliance with medications is good, though there are challenges with continuous oxygen use. The patient expresses concerns about the logistics of oxygen therapy and financial aspects of healthcare.    Medications    Current Outpatient Medications:     acetaminophen (TYLENOL) 650 mg CR tablet, Take 1 tablet (650 mg total) by mouth every 8 (eight) hours as needed for mild pain, Disp: 90 tablet, Rfl: 5    senna-docusate sodium (SENOKOT S) 8.6-50 mg per tablet, Take 1 tablet by mouth daily, Disp: 30 tablet, Rfl: 5    brimonidine tartrate 0.2 % ophthalmic solution, INSTILL 1 DROP IN LEFT EYE TWICE DAILY INSTILL 1 DROP IN LEFT EYE DOS VECES AL EULALIO, Disp: , Rfl:     budesonide (Pulmicort) 0.5 mg/2 mL nebulizer solution, Take 2 mL (0.5 mg total) by nebulization 2 (two) times a day Rinse mouth after use., Disp: 360 mL, Rfl: 3    formoterol (PERFOROMIST) 20 MCG/2ML nebulizer solution, Take 2 mL (20 mcg total) by nebulization 2 (two) times a day, Disp: 120 mL, Rfl: 30    ipratropium-albuterol (DUO-NEB) 0.5-2.5 mg/3 mL nebulizer solution, Take 3 mL by nebulization 2 (two) times a day, Disp: 540 mL, Rfl: 3    losartan (COZAAR) 25 mg tablet, TAKE ONE TABLET BY MOUTH TWICE DAILYTOMAR 1 TABLETA POR VIA ORAL DOS VECES AL EULALIO, Disp: 180 tablet, Rfl: 0    mometasone (ELOCON) 0.1 % lotion, Apply topically daily, Disp: 60 mL, Rfl: 5    pantoprazole (PROTONIX) 40 mg tablet, Take 1 tablet (40 mg total) by mouth daily, Disp: 30 tablet, Rfl: 5    predniSONE 5 mg  tablet, Take 1 tablet (5 mg total) by mouth daily, Disp: 30 tablet, Rfl: 2      Allergies  Allergies   Allergen Reactions    Penicillins Hives, Itching and Rash    Lisinopril Rash     Side pains and rash     Zyprexa [Olanzapine] Tongue Swelling         Past Medical History  Past Medical History:   Diagnosis Date    Acute metabolic encephalopathy 06/13/2020    Age-related cataract of right eye 04/04/2023    patient is medically cleared and presents with low risk of complication with this upcoming R cataract surgery.    Anemia     Aneurysm, aorta, thoracic (HCC)     Appendicolith     Ascending aortic aneurysm (HCC)     3.7    Asthma     BPH (benign prostatic hyperplasia)     CAD (coronary artery disease)     noted on CT scan    CHF (congestive heart failure) (HCC)     COPD (chronic obstructive pulmonary disease) (HCC)     Descending thoracic aortic aneurysm (HCC)     Diabetes mellitus (HCC)     Diverticulosis     Former tobacco use     GERD (gastroesophageal reflux disease)     History of DVT (deep vein thrombosis)     Left leg    History of transfusion     Hypertension     Hypoxemia 04/30/2023    Inguinal hernia     right    Nephrolithiasis     Oxygen dependent     2LNC    Oxygen dependent     Pneumonia     Pre-diabetes     Prostate calculus     PVD (peripheral vascular disease) (HCC)     Recurrent right inguinal hernia w incarcertion 01/04/2023    Thoracic aortic aneurysm without rupture (HCC) 11/19/2018    Added automatically from request for surgery [id number]    Thrombocytopenia (HCC) 12/29/2018    Ulcer     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)     Varicose vein of leg     b/l         Past Surgical History  Past Surgical History:   Procedure Laterality Date    CARDIAC SURGERY      ESOPHAGOGASTRODUODENOSCOPY      HERNIA REPAIR Right 1/21/2019    Procedure: REPAIR HERNIA INGUINAL WITH MESH;  Surgeon: David Lowry MD;  Location:  MAIN OR;  Service: General    HERNIA REPAIR Right 7/22/2023     "Procedure: REPAIR RECURRENT INCARERATED HERNIA INGUINAL OPEN, RIGHT ORCHIECTOMY;  Surgeon: Mason Bertrand MD;  Location: [facility];  Service: General    INGUINAL HERNIA REPAIR Bilateral     IR TEVAR  12/27/2018    NE EVASC RPR DTA COVERAGE ART ORIGIN 1ST ENDOPROSTH N/A 12/27/2018    Procedure: TEVAR - endovascular thoracic aortic aneurysm repair;  Surgeon: Gladis Ortega MD;  Location: [facility];  Service: Vascular    THORACIC AORTIC ANEURYSM REPAIR  12/27/2018    VARICOSE VEIN SURGERY Bilateral     vein stripping         Social History  No new social history provided.    Family History  No new family history provided.    Review of Systems  Positive for occasional dyspnea. Constipation and pain in his back.    Vital Signs  /80 (BP Location: Left arm, Patient Position: Sitting, Cuff Size: Standard)   Pulse 78   Temp 97.8 °F (36.6 °C) (Tympanic)   Resp 20   Ht 5' 3\" (1.6 m)   Wt 60.3 kg (133 lb)   SpO2 96% Comment: oxygen 3 liters  BMI 23.56 kg/m²       Physical Exam  General: Patient is alert and oriented. Cardiovascular: Regular rate and rhythm, no murmurs. Respiratory: Improved air entry, no wheezes or crackles auscultated.        Assessment and Plan  COPD: Patient demonstrates general improvement. Continue current medications. Reinforce the importance of regular use of supplemental oxygen to maintain adequate oxygenation, especially at night and exercise.  Hypertension: Blood pressure is controlled at 140/80 mmHg on current regimen. Continue current antihypertensive medication. Monitor blood pressure and adjust therapy as needed.          Kirby Casanova MD   [facility]"

## 2024-03-26 ENCOUNTER — CLINICAL SUPPORT (OUTPATIENT)
Dept: PULMONOLOGY | Facility: CLINIC | Age: 89
End: 2024-03-26
Payer: MEDICARE

## 2024-03-26 DIAGNOSIS — J44.9 CHRONIC OBSTRUCTIVE PULMONARY DISEASE, UNSPECIFIED COPD TYPE (HCC): Primary | ICD-10-CM

## 2024-03-26 PROCEDURE — G0239 OTH RESP PROC, GROUP: HCPCS

## 2024-03-28 ENCOUNTER — CLINICAL SUPPORT (OUTPATIENT)
Dept: PULMONOLOGY | Facility: CLINIC | Age: 89
End: 2024-03-28
Payer: MEDICARE

## 2024-03-28 DIAGNOSIS — J44.9 CHRONIC OBSTRUCTIVE PULMONARY DISEASE, UNSPECIFIED COPD TYPE (HCC): Primary | ICD-10-CM

## 2024-03-28 PROCEDURE — G0239 OTH RESP PROC, GROUP: HCPCS

## 2024-04-02 ENCOUNTER — CLINICAL SUPPORT (OUTPATIENT)
Dept: PULMONOLOGY | Facility: CLINIC | Age: 89
End: 2024-04-02
Payer: MEDICARE

## 2024-04-02 DIAGNOSIS — J44.9 CHRONIC OBSTRUCTIVE PULMONARY DISEASE, UNSPECIFIED COPD TYPE (HCC): Primary | ICD-10-CM

## 2024-04-02 PROCEDURE — G0239 OTH RESP PROC, GROUP: HCPCS

## 2024-04-03 ENCOUNTER — TELEPHONE (OUTPATIENT)
Dept: NEUROSURGERY | Facility: CLINIC | Age: 89
End: 2024-04-03

## 2024-04-03 NOTE — TELEPHONE ENCOUNTER
This is 3rd time speaking to spouse bon today  she never rescheuled his cta he no showed for in feb  I transfored pt over to central scheduling and she will call back yp make our f/u _BA

## 2024-04-04 ENCOUNTER — CLINICAL SUPPORT (OUTPATIENT)
Dept: PULMONOLOGY | Facility: CLINIC | Age: 89
End: 2024-04-04
Payer: MEDICARE

## 2024-04-04 DIAGNOSIS — J44.9 CHRONIC OBSTRUCTIVE PULMONARY DISEASE, UNSPECIFIED COPD TYPE (HCC): Primary | ICD-10-CM

## 2024-04-04 PROCEDURE — G0239 OTH RESP PROC, GROUP: HCPCS

## 2024-04-04 NOTE — PROGRESS NOTES
Pulmonary Rehabilitation Plan of Care   60 DAY      Today's date: 2024   # of Exercise Sessions Completed: 15  Patient name: Severiano Feliciano      : 1935  Age: 88 y.o.       MRN: [id number]  Referring Physician: Buddy Hayward MD  Pulmonologist: Buddy Hayward MD    Provider: [place]  Clinician: Erin Gabriel MS, CEP    Dx:    Encounter Diagnosis   Name Primary?    Chronic obstructive pulmonary disease, unspecified COPD type (HCC) Yes     Date of onset: 2024      SUMMARY OF PROGRESS: This is Severiano's 60 day note in pulmonary rehab. He has been attending 2x/week and completed 7 sessions since his last progress note. Communication is difficult with Severiano, not only with the language barrier but also due to severe hearing loss and he often does not wear his hearing aids. His son Daljit was not available today for translation and he does not understand [employer] interpretation services.   Severiano has been tolerating 35-40 mins of exercise at 2.3-2.7 METs on 3LO2. O2 is occ increased to 4L at rest, because SpO2 ranges from 85-89% right after walking in to rehab. Resting HR 71-82 bpm, increasing to 72-95 bpm during exercise. Resting //80 with occ elevation at rest 138//84. Patient's family reports 100% medication compliance. Resting SpO2 90-94% and 90-98% during exercise. Severiano does not understand the [place] Dyspnea chart but it seems he experiences 3-4/10 dyspnea with exercise. His family reports him being able to get around the house more easily and he has not been using his cane. He was encouraged to increase walking at home and encourage him to complete simple tasks on his own but with supervision. Severiano has lost 6 lbs ,without intention, since his initial evaluation and his family is in charge of his meals at home. No concern of anxiety or depression at this time. Severiano will continue to be progressed over the next 30 days to reach his goals.    Patient specific goals include: become more independent and move more at home.    Medication compliance: Yes   Comments: Pt reports to be compliant with medications    Fall Risk: High   Comments: Patient uses walking assist device (walker/cane/rollator) and Denies a fall in the past 6 months    Smoking: Former user  Quit 20 years ago    Pulmonary Disease Risk Factors:  Occupational exposure to workplace  fumes    RPD at Rest: 0-1/10  RPD with Exercise:  0-4/10    Assessment of progression of lung disease and functional status:  CAT: not assessed  Shortness of breath questionnaire: 54/120      EXERCISE ASSESSMENT and PLAN    Current Exercise Program in Rehab:       Frequency: 2 days/week   Supplement with home exercise 2+ days/wk as tolerated        Minutes: 35-40         METS: 2.3-2.7              SpO2: 90-98%              RPD: 0-4                      HR: 72-95   RPE: 4-5         Modalities: UBE, NuStep, Recumbent bike, and Room walking      Exercise Progression 30 Day Goals :    Frequency: 2 days/week    Supplement with home exercise 2+ days/wk as tolerated       Minutes: 40-45        METS: 2.5-3.2              SpO2: >88%              RPD: 4-6                      HR:    RPE: 4-5        Modalities: UBE, NuStep, Recumbent bike, and Room walking     Strength trainin-3 days / week  12-15 repetitions  1-2 sets per modality    Modalities: Sit to Stands, Calf Raises, and leg extensions    Home Exercise: none    Education: pursed lipped breathing, fall prevention while using nasal canula tubing, relaxation breathing, benefits of exercise for pulmonary disease, RPD scale, O2 saturation monitoring, and appropriate O2 response to exercise    SMART Goals:   improved 6MWT distance, reduced dyspnea during exercise, improved exercise tolerance based on peak METs tolerated in pulmonary rehab exercise session, and SpO2 >88% during exercise    Patient Specific EXERCISE GOALS:   (home exercise, ADLs): reduced  dependence on supplemental O2, attend pulmonary rehab regularly, decrease sitting time at home, start a walking program, reduced pulmonary related hospital readmissions , increased muscular strength, increased energy, increased stamina with ADLs, and more independence at home    Patient's progress toward SMART and personal goals: Has been compliant with attending pulmonary rehab 2x/week; son reports pt being able to get around the house more with ease. Added in rec bike and light body weight training    Patient's goals for next 30 days: continue to attend pulmonary rehab 2x/week; increase walking frequency in rehab and start short walks at home with supervision    Plan: Titrate supplemental oxygen as needed to maintain SpO2>88% with exercise, reduce time sitting at home, and utilize PLB with physical activity    Readiness to change: Action:  (Changing behavior)      NUTRITION ASSESSMENT AND PLAN    Weight control:    Starting weight: 137 lbs   Current weight: 131.4 lbs    Diabetes: N/A    SMART Goals:   eat 6 or more servings of grain products per day, eat 5 or more servings of fruits and vegetables a day, rarely eat processed meats or eat low fat processed meats, Do not eat fried foods, choose healthy snacks such as fruit, pretzels, and low fat crackers, choose healthy desserts and sweets such as patrice food cake or  fruit, choose low sugar desserts and sweets, and drink less than 8oz of soda and sweetened drinks per day      Patient Specific NUTRITION GOALS:  (wt control, diabetes management, dietary modifications): improve hydration increased muscle mass    Patient's progress toward SMART and personal goals: Patient has lost 6 lbs without intention    Patient's goals for next 30 days: reduce sweets and increase healthy fats for healthy weight gain and muscle mass      Measurable goals were based Rate Your Plate Dietary Self-Assessment. These are the areas in which the patient could score higher on the assessment.   Goals include recommendations for a heart healthy diet based on American Heart Association.    Education: heart healthy eating principles    Plan: group Class: Heart Healthy Eating, replace refined flours with whole grains, increase daily intake of fruits and vegetables, increase daily intake of low fat dairy, eat healthy snacks like fruit, pretzels, and low fat crackers, and choose desserts such as fruit, patrice food cake, low-fat or fat-free sweets    Readiness to change: Preparation:  (Getting ready to change)       PSYCHOSOCIAL ASSESSMENT AND PLAN    Emotional:  Depression assessment:  PHQ-9 = 9  5-9 = Mild Depression            Anxiety measure:  LILA-7 = 6  5-9 = Mild anxiety    Assessment of depression and anxiety    Patient's son denies anxiety and depression on patient's behalf    Self-reported stress level:  2  Stress Management: Practice Relaxation Techniques, Exercise, and Enjoy a hobby    Patient's rating of Social support: Very Good   Social Support Network: children, grandchildren, and spouse    Psychosocial Assessment as it relates to rehabilitation: Patient denies issues with his/her family or home life that may affect their rehabilitation efforts.       SMART Goals:    PHQ-9 - reduced severity by one level, Physical Fitness in [place] Score < 3, Daily Activity in [place] Score < 3, Pain in [place] Score < 3, Overall Health in [place] Score < 3, Quality of Life in [facility] Score < 3 , Change in Health [facility] < 3 ,  Increased interest and pleasure in doing things, and feel less tired with more energy    Patient Specific PSYCHOCOSOCIAL GOALS:  (stress, emotional well being, social support): spend time with family    Patient's progress toward SMART and personal goals: attends pulmonary rehab 2x/week and enjoys coming    Patient's goals for next 30 days: Be more active and increase energy level in return      Education: benefits of a positive support system, stress management  techniques, and depression and pulmonary disease    Plan: Exercise, Reduce dependence  on family, Return to previous social activity, and Avoid triggers    Readiness to change: Action:  (Changing behavior)      OTHER CORE COMPONENTS     Tobacco:   Social History     Tobacco Use   Smoking Status Former    Current packs/day: 0.00    Average packs/day: 1 pack/day for 35.0 years (35.0 ttl pk-yrs)    Types: Cigarettes    Start date:     Quit date:     Years since quittin.2   Smokeless Tobacco Never       Tobacco Use Intervention: Referral to tobacco expert:   Pt quit 20 years ago   and has abstained    Blood pressure:    Restin//84    Oxygen needs:   Rest:  supplemental O2 via nasal cannula @ 3L/min   Exercise/physical activity:  supplemental O2 via nasal cannula @ 3-4L/min   Sleep:   supplemental O2 via nasal cannula @ 3L/min     Oxygen Goal: Maintain SpO2>88% during exercise    SMART Goals: reduced dietary sodium <2000mg and medication compliance    Patient Specific CORE COMPONENT GOALS (smoking, BP control, angina control, medication compliance): Take inhalers properly to ensure he is receiving the medication    Patient's progress toward SMART and personal goals: has maintained SpO2 >88% during exercise on 3LO2; Occ elevated resting BP    Patient's goals for next 30 days: Continue to utilize supplemental O2 at 3L with exercise and increase to 4L to maintain SpO2 >88%; ensure 100% medications compliance.     Education:  pathophysiology of pulmonary disease, inspiratory muscle training, and education: Pulmonary Anatomy and Physiology    Plan: medication compliance, engage in regular exercise, and monitor home BP    Readiness to change: Action:  (Changing behavior)

## 2024-04-09 ENCOUNTER — CLINICAL SUPPORT (OUTPATIENT)
Dept: PULMONOLOGY | Facility: CLINIC | Age: 89
End: 2024-04-09
Payer: MEDICARE

## 2024-04-09 DIAGNOSIS — J44.9 CHRONIC OBSTRUCTIVE PULMONARY DISEASE, UNSPECIFIED COPD TYPE (HCC): Primary | ICD-10-CM

## 2024-04-09 PROCEDURE — G0239 OTH RESP PROC, GROUP: HCPCS

## 2024-04-11 ENCOUNTER — CLINICAL SUPPORT (OUTPATIENT)
Dept: PULMONOLOGY | Facility: CLINIC | Age: 89
End: 2024-04-11
Payer: MEDICARE

## 2024-04-11 DIAGNOSIS — J44.9 CHRONIC OBSTRUCTIVE PULMONARY DISEASE, UNSPECIFIED COPD TYPE (HCC): Primary | ICD-10-CM

## 2024-04-11 PROCEDURE — G0239 OTH RESP PROC, GROUP: HCPCS

## 2024-04-16 ENCOUNTER — CLINICAL SUPPORT (OUTPATIENT)
Dept: PULMONOLOGY | Facility: CLINIC | Age: 89
End: 2024-04-16
Payer: MEDICARE

## 2024-04-16 DIAGNOSIS — J44.9 CHRONIC OBSTRUCTIVE PULMONARY DISEASE, UNSPECIFIED COPD TYPE (HCC): Primary | ICD-10-CM

## 2024-04-16 PROCEDURE — G0239 OTH RESP PROC, GROUP: HCPCS

## 2024-04-18 ENCOUNTER — CLINICAL SUPPORT (OUTPATIENT)
Dept: PULMONOLOGY | Facility: CLINIC | Age: 89
End: 2024-04-18
Payer: MEDICARE

## 2024-04-18 DIAGNOSIS — J44.9 CHRONIC OBSTRUCTIVE PULMONARY DISEASE, UNSPECIFIED COPD TYPE (HCC): Primary | ICD-10-CM

## 2024-04-18 PROCEDURE — G0239 OTH RESP PROC, GROUP: HCPCS

## 2024-04-22 DIAGNOSIS — J44.9 CHRONIC OBSTRUCTIVE PULMONARY DISEASE, UNSPECIFIED COPD TYPE (HCC): ICD-10-CM

## 2024-04-22 RX ORDER — FLUTICASONE FUROATE, UMECLIDINIUM BROMIDE AND VILANTEROL TRIFENATATE 200; 62.5; 25 UG/1; UG/1; UG/1
1 POWDER RESPIRATORY (INHALATION) DAILY
Qty: 60 EACH | Refills: 11 | Status: SHIPPED | OUTPATIENT
Start: 2024-04-22 | End: 2024-05-03

## 2024-04-23 ENCOUNTER — CLINICAL SUPPORT (OUTPATIENT)
Dept: PULMONOLOGY | Facility: CLINIC | Age: 89
End: 2024-04-23
Payer: MEDICARE

## 2024-04-23 DIAGNOSIS — J44.9 CHRONIC OBSTRUCTIVE PULMONARY DISEASE, UNSPECIFIED COPD TYPE (HCC): Primary | ICD-10-CM

## 2024-04-23 PROCEDURE — G0239 OTH RESP PROC, GROUP: HCPCS

## 2024-04-25 ENCOUNTER — APPOINTMENT (EMERGENCY)
Dept: RADIOLOGY | Facility: HOSPITAL | Age: 89
DRG: 189 | End: 2024-04-25
Payer: MEDICARE

## 2024-04-25 ENCOUNTER — HOSPITAL ENCOUNTER (INPATIENT)
Facility: HOSPITAL | Age: 89
LOS: 2 days | Discharge: HOME | DRG: 189 | End: 2024-04-27
Attending: EMERGENCY MEDICINE | Admitting: STUDENT IN AN ORGANIZED HEALTH CARE EDUCATION/TRAINING PROGRAM
Payer: MEDICARE

## 2024-04-25 ENCOUNTER — APPOINTMENT (OUTPATIENT)
Dept: PULMONOLOGY | Facility: CLINIC | Age: 89
End: 2024-04-25
Payer: MEDICARE

## 2024-04-25 DIAGNOSIS — R09.02 HYPOXIA: ICD-10-CM

## 2024-04-25 DIAGNOSIS — J44.1 COPD EXACERBATION (HCC): ICD-10-CM

## 2024-04-25 DIAGNOSIS — J96.21 ACUTE ON CHRONIC RESPIRATORY FAILURE WITH HYPOXIA AND HYPERCAPNIA (HCC): ICD-10-CM

## 2024-04-25 DIAGNOSIS — I10 HYPERTENSION: ICD-10-CM

## 2024-04-25 DIAGNOSIS — J96.22 ACUTE ON CHRONIC RESPIRATORY FAILURE WITH HYPOXIA AND HYPERCAPNIA (HCC): ICD-10-CM

## 2024-04-25 DIAGNOSIS — J44.9 COPD (CHRONIC OBSTRUCTIVE PULMONARY DISEASE) (HCC): Primary | ICD-10-CM

## 2024-04-25 PROBLEM — R41.0 DELIRIUM: Status: ACTIVE | Noted: 2024-04-25

## 2024-04-25 LAB
2HR DELTA HS TROPONIN: 3 NG/L
ALBUMIN SERPL BCP-MCNC: 3.8 G/DL (ref 3.5–5)
ALP SERPL-CCNC: 61 U/L (ref 34–104)
ALT SERPL W P-5'-P-CCNC: 10 U/L (ref 7–52)
APTT PPP: 28 SECONDS (ref 23–37)
ARTERIAL PATENCY WRIST A: YES
ARTERIAL PATENCY WRIST A: YES
AST SERPL W P-5'-P-CCNC: 16 U/L (ref 13–39)
ATRIAL RATE: 88 BPM
ATRIAL RATE: 90 BPM
BASE EXCESS BLDA CALC-SCNC: 11.2 MMOL/L
BASE EXCESS BLDA CALC-SCNC: 12.6 MMOL/L
BASOPHILS # BLD AUTO: 0.03 THOUSANDS/ÂΜL (ref 0–0.1)
BASOPHILS NFR BLD AUTO: 0 % (ref 0–1)
BILIRUB SERPL-MCNC: 0.34 MG/DL (ref 0.2–1)
BILIRUB UR QL STRIP: NEGATIVE
BNP SERPL-MCNC: 154 PG/ML (ref 0–100)
BUN SERPL-MCNC: 23 MG/DL (ref 5–25)
CALCIUM SERPL-MCNC: 9.8 MG/DL (ref 8.4–10.2)
CARDIAC TROPONIN I PNL SERPL HS: 25 NG/L
CARDIAC TROPONIN I PNL SERPL HS: 28 NG/L
CHLORIDE SERPL-SCNC: 96 MMOL/L (ref 96–108)
CLARITY UR: CLEAR
CO2 SERPL-SCNC: >45 MMOL/L (ref 21–32)
COLOR UR: NORMAL
CREAT SERPL-MCNC: 0.9 MG/DL (ref 0.6–1.3)
EOSINOPHIL # BLD AUTO: 0.53 THOUSAND/ÂΜL (ref 0–0.61)
EOSINOPHIL NFR BLD AUTO: 7 % (ref 0–6)
ERYTHROCYTE [DISTWIDTH] IN BLOOD BY AUTOMATED COUNT: 12.5 % (ref 11.6–15.1)
FLUAV RNA RESP QL NAA+PROBE: NEGATIVE
FLUBV RNA RESP QL NAA+PROBE: NEGATIVE
GFR SERPL CREATININE-BSD FRML MDRD: 77 ML/MIN/1.73SQ M
GLUCOSE SERPL-MCNC: 125 MG/DL (ref 65–140)
GLUCOSE UR STRIP-MCNC: NEGATIVE MG/DL
HCO3 BLDA-SCNC: 40 MMOL/L (ref 22–28)
HCO3 BLDA-SCNC: 41.2 MMOL/L (ref 22–28)
HCT VFR BLD AUTO: 38.3 % (ref 36.5–49.3)
HGB BLD-MCNC: 11.6 G/DL (ref 12–17)
HGB UR QL STRIP.AUTO: NEGATIVE
IMM GRANULOCYTES # BLD AUTO: 0.02 THOUSAND/UL (ref 0–0.2)
IMM GRANULOCYTES NFR BLD AUTO: 0 % (ref 0–2)
INR PPP: 0.95 (ref 0.84–1.19)
KETONES UR STRIP-MCNC: NEGATIVE MG/DL
LEUKOCYTE ESTERASE UR QL STRIP: NEGATIVE
LYMPHOCYTES # BLD AUTO: 1.02 THOUSANDS/ÂΜL (ref 0.6–4.47)
LYMPHOCYTES NFR BLD AUTO: 14 % (ref 14–44)
MCH RBC QN AUTO: 32.4 PG (ref 26.8–34.3)
MCHC RBC AUTO-ENTMCNC: 30.3 G/DL (ref 31.4–37.4)
MCV RBC AUTO: 107 FL (ref 82–98)
MONOCYTES # BLD AUTO: 0.53 THOUSAND/ÂΜL (ref 0.17–1.22)
MONOCYTES NFR BLD AUTO: 7 % (ref 4–12)
NASAL CANNULA: 3
NASAL CANNULA: 3
NEUTROPHILS # BLD AUTO: 5.05 THOUSANDS/ÂΜL (ref 1.85–7.62)
NEUTS SEG NFR BLD AUTO: 72 % (ref 43–75)
NITRITE UR QL STRIP: NEGATIVE
NRBC BLD AUTO-RTO: 0 /100 WBCS
O2 CT BLDA-SCNC: 16.6 ML/DL (ref 16–23)
O2 CT BLDA-SCNC: 18.1 ML/DL (ref 16–23)
OXYHGB MFR BLDA: 92.6 % (ref 94–97)
OXYHGB MFR BLDA: 93.4 % (ref 94–97)
P AXIS: 69 DEGREES
P AXIS: 87 DEGREES
PCO2 BLDA: 73.5 MM HG (ref 36–44)
PCO2 BLDA: 75.1 MM HG (ref 36–44)
PH BLDA: 7.35 [PH] (ref 7.35–7.45)
PH BLDA: 7.36 [PH] (ref 7.35–7.45)
PH UR STRIP.AUTO: 8 [PH]
PLATELET # BLD AUTO: 117 THOUSANDS/UL (ref 149–390)
PMV BLD AUTO: 9.4 FL (ref 8.9–12.7)
PO2 BLDA: 73.6 MM HG (ref 75–129)
PO2 BLDA: 76.7 MM HG (ref 75–129)
POTASSIUM SERPL-SCNC: 4.3 MMOL/L (ref 3.5–5.3)
PR INTERVAL: 152 MS
PR INTERVAL: 158 MS
PROT SERPL-MCNC: 6.9 G/DL (ref 6.4–8.4)
PROT UR STRIP-MCNC: NEGATIVE MG/DL
PROTHROMBIN TIME: 13 SECONDS (ref 11.6–14.5)
QRS AXIS: 67 DEGREES
QRS AXIS: 69 DEGREES
QRSD INTERVAL: 86 MS
QRSD INTERVAL: 86 MS
QT INTERVAL: 368 MS
QT INTERVAL: 380 MS
QTC INTERVAL: 445 MS
QTC INTERVAL: 464 MS
RBC # BLD AUTO: 3.58 MILLION/UL (ref 3.88–5.62)
RSV RNA RESP QL NAA+PROBE: NEGATIVE
SARS-COV-2 RNA RESP QL NAA+PROBE: NEGATIVE
SODIUM SERPL-SCNC: 146 MMOL/L (ref 135–147)
SP GR UR STRIP.AUTO: 1.01 (ref 1–1.04)
SPECIMEN SOURCE: ABNORMAL
SPECIMEN SOURCE: ABNORMAL
T WAVE AXIS: 61 DEGREES
T WAVE AXIS: 63 DEGREES
TSH SERPL DL<=0.05 MIU/L-ACNC: 0.26 UIU/ML (ref 0.45–4.5)
UROBILINOGEN UA: NEGATIVE MG/DL
VENTRICULAR RATE: 88 BPM
VENTRICULAR RATE: 90 BPM
WBC # BLD AUTO: 7.18 THOUSAND/UL (ref 4.31–10.16)

## 2024-04-25 PROCEDURE — 93005 ELECTROCARDIOGRAM TRACING: CPT

## 2024-04-25 PROCEDURE — 85730 THROMBOPLASTIN TIME PARTIAL: CPT | Performed by: EMERGENCY MEDICINE

## 2024-04-25 PROCEDURE — 99285 EMERGENCY DEPT VISIT HI MDM: CPT

## 2024-04-25 PROCEDURE — 87040 BLOOD CULTURE FOR BACTERIA: CPT | Performed by: EMERGENCY MEDICINE

## 2024-04-25 PROCEDURE — 84439 ASSAY OF FREE THYROXINE: CPT

## 2024-04-25 PROCEDURE — 99223 1ST HOSP IP/OBS HIGH 75: CPT | Performed by: STUDENT IN AN ORGANIZED HEALTH CARE EDUCATION/TRAINING PROGRAM

## 2024-04-25 PROCEDURE — 96374 THER/PROPH/DIAG INJ IV PUSH: CPT

## 2024-04-25 PROCEDURE — 71045 X-RAY EXAM CHEST 1 VIEW: CPT

## 2024-04-25 PROCEDURE — 94760 N-INVAS EAR/PLS OXIMETRY 1: CPT

## 2024-04-25 PROCEDURE — 85610 PROTHROMBIN TIME: CPT | Performed by: EMERGENCY MEDICINE

## 2024-04-25 PROCEDURE — 94644 CONT INHLJ TX 1ST HOUR: CPT

## 2024-04-25 PROCEDURE — 82805 BLOOD GASES W/O2 SATURATION: CPT | Performed by: EMERGENCY MEDICINE

## 2024-04-25 PROCEDURE — 83880 ASSAY OF NATRIURETIC PEPTIDE: CPT | Performed by: EMERGENCY MEDICINE

## 2024-04-25 PROCEDURE — 80053 COMPREHEN METABOLIC PANEL: CPT | Performed by: EMERGENCY MEDICINE

## 2024-04-25 PROCEDURE — 36415 COLL VENOUS BLD VENIPUNCTURE: CPT | Performed by: EMERGENCY MEDICINE

## 2024-04-25 PROCEDURE — 93010 ELECTROCARDIOGRAM REPORT: CPT | Performed by: INTERNAL MEDICINE

## 2024-04-25 PROCEDURE — 84484 ASSAY OF TROPONIN QUANT: CPT | Performed by: EMERGENCY MEDICINE

## 2024-04-25 PROCEDURE — 0241U HB NFCT DS VIR RESP RNA 4 TRGT: CPT | Performed by: EMERGENCY MEDICINE

## 2024-04-25 PROCEDURE — 85025 COMPLETE CBC W/AUTO DIFF WBC: CPT | Performed by: EMERGENCY MEDICINE

## 2024-04-25 PROCEDURE — 82805 BLOOD GASES W/O2 SATURATION: CPT

## 2024-04-25 PROCEDURE — 84443 ASSAY THYROID STIM HORMONE: CPT

## 2024-04-25 PROCEDURE — 94640 AIRWAY INHALATION TREATMENT: CPT

## 2024-04-25 PROCEDURE — 99285 EMERGENCY DEPT VISIT HI MDM: CPT | Performed by: EMERGENCY MEDICINE

## 2024-04-25 RX ORDER — IPRATROPIUM BROMIDE AND ALBUTEROL SULFATE 2.5; .5 MG/3ML; MG/3ML
3 SOLUTION RESPIRATORY (INHALATION)
Status: DISCONTINUED | OUTPATIENT
Start: 2024-04-26 | End: 2024-04-25

## 2024-04-25 RX ORDER — FORMOTEROL FUMARATE DIHYDRATE 20 UG/2ML
20 SOLUTION RESPIRATORY (INHALATION)
Status: DISCONTINUED | OUTPATIENT
Start: 2024-04-25 | End: 2024-04-27 | Stop reason: HOSPADM

## 2024-04-25 RX ORDER — IPRATROPIUM BROMIDE AND ALBUTEROL SULFATE 2.5; .5 MG/3ML; MG/3ML
3 SOLUTION RESPIRATORY (INHALATION) 2 TIMES DAILY
Status: DISCONTINUED | OUTPATIENT
Start: 2024-04-25 | End: 2024-04-25

## 2024-04-25 RX ORDER — FORMOTEROL FUMARATE DIHYDRATE 20 UG/2ML
20 SOLUTION RESPIRATORY (INHALATION) 2 TIMES DAILY
Status: DISCONTINUED | OUTPATIENT
Start: 2024-04-25 | End: 2024-04-25

## 2024-04-25 RX ORDER — AZITHROMYCIN 250 MG/1
500 TABLET, FILM COATED ORAL ONCE
Status: COMPLETED | OUTPATIENT
Start: 2024-04-25 | End: 2024-04-25

## 2024-04-25 RX ORDER — SODIUM CHLORIDE FOR INHALATION 0.9 %
12 VIAL, NEBULIZER (ML) INHALATION ONCE
Status: COMPLETED | OUTPATIENT
Start: 2024-04-25 | End: 2024-04-25

## 2024-04-25 RX ORDER — IPRATROPIUM BROMIDE AND ALBUTEROL SULFATE 2.5; .5 MG/3ML; MG/3ML
3 SOLUTION RESPIRATORY (INHALATION)
Status: DISCONTINUED | OUTPATIENT
Start: 2024-04-26 | End: 2024-04-27 | Stop reason: HOSPADM

## 2024-04-25 RX ORDER — METHYLPREDNISOLONE SODIUM SUCCINATE 125 MG/2ML
125 INJECTION, POWDER, LYOPHILIZED, FOR SOLUTION INTRAMUSCULAR; INTRAVENOUS ONCE
Status: COMPLETED | OUTPATIENT
Start: 2024-04-25 | End: 2024-04-25

## 2024-04-25 RX ORDER — PANTOPRAZOLE SODIUM 40 MG/1
40 TABLET, DELAYED RELEASE ORAL DAILY
Status: DISCONTINUED | OUTPATIENT
Start: 2024-04-25 | End: 2024-04-27 | Stop reason: HOSPADM

## 2024-04-25 RX ORDER — ALBUTEROL SULFATE 2.5 MG/3ML
2.5 SOLUTION RESPIRATORY (INHALATION) EVERY 4 HOURS PRN
Status: DISCONTINUED | OUTPATIENT
Start: 2024-04-25 | End: 2024-04-27 | Stop reason: HOSPADM

## 2024-04-25 RX ORDER — QUETIAPINE FUMARATE 25 MG/1
12.5 TABLET, FILM COATED ORAL DAILY PRN
Status: DISCONTINUED | OUTPATIENT
Start: 2024-04-26 | End: 2024-04-25

## 2024-04-25 RX ORDER — LOSARTAN POTASSIUM 25 MG/1
25 TABLET ORAL DAILY
Status: DISCONTINUED | OUTPATIENT
Start: 2024-04-26 | End: 2024-04-27 | Stop reason: HOSPADM

## 2024-04-25 RX ORDER — PREDNISONE 20 MG/1
40 TABLET ORAL DAILY
Status: DISCONTINUED | OUTPATIENT
Start: 2024-04-26 | End: 2024-04-27 | Stop reason: HOSPADM

## 2024-04-25 RX ORDER — QUETIAPINE FUMARATE 25 MG/1
25 TABLET, FILM COATED ORAL DAILY PRN
Status: DISCONTINUED | OUTPATIENT
Start: 2024-04-25 | End: 2024-04-25

## 2024-04-25 RX ORDER — SODIUM CHLORIDE 9 MG/ML
50 INJECTION, SOLUTION INTRAVENOUS CONTINUOUS
Status: DISCONTINUED | OUTPATIENT
Start: 2024-04-25 | End: 2024-04-25

## 2024-04-25 RX ORDER — FLUTICASONE FUROATE AND VILANTEROL 200; 25 UG/1; UG/1
1 POWDER RESPIRATORY (INHALATION) DAILY
Status: DISCONTINUED | OUTPATIENT
Start: 2024-04-25 | End: 2024-04-25

## 2024-04-25 RX ORDER — LOSARTAN POTASSIUM 25 MG/1
25 TABLET ORAL ONCE
Status: COMPLETED | OUTPATIENT
Start: 2024-04-25 | End: 2024-04-25

## 2024-04-25 RX ORDER — LANOLIN ALCOHOL/MO/W.PET/CERES
3 CREAM (GRAM) TOPICAL
Status: DISCONTINUED | OUTPATIENT
Start: 2024-04-25 | End: 2024-04-27 | Stop reason: HOSPADM

## 2024-04-25 RX ORDER — IPRATROPIUM BROMIDE AND ALBUTEROL SULFATE 2.5; .5 MG/3ML; MG/3ML
3 SOLUTION RESPIRATORY (INHALATION)
Status: DISCONTINUED | OUTPATIENT
Start: 2024-04-25 | End: 2024-04-25

## 2024-04-25 RX ORDER — AZITHROMYCIN 250 MG/1
250 TABLET, FILM COATED ORAL EVERY 24 HOURS
Status: DISCONTINUED | OUTPATIENT
Start: 2024-04-26 | End: 2024-04-26

## 2024-04-25 RX ORDER — BRIMONIDINE TARTRATE 2 MG/ML
1 SOLUTION/ DROPS OPHTHALMIC 2 TIMES DAILY
Status: DISCONTINUED | OUTPATIENT
Start: 2024-04-25 | End: 2024-04-27 | Stop reason: HOSPADM

## 2024-04-25 RX ORDER — ACETAMINOPHEN 325 MG/1
325 TABLET ORAL EVERY 6 HOURS PRN
Status: DISCONTINUED | OUTPATIENT
Start: 2024-04-25 | End: 2024-04-27 | Stop reason: HOSPADM

## 2024-04-25 RX ORDER — BUDESONIDE 0.5 MG/2ML
0.5 INHALANT ORAL
Status: DISCONTINUED | OUTPATIENT
Start: 2024-04-25 | End: 2024-04-27 | Stop reason: HOSPADM

## 2024-04-25 RX ORDER — BUDESONIDE 0.5 MG/2ML
0.5 INHALANT ORAL 2 TIMES DAILY
Status: DISCONTINUED | OUTPATIENT
Start: 2024-04-25 | End: 2024-04-25

## 2024-04-25 RX ORDER — QUETIAPINE FUMARATE 25 MG/1
12.5 TABLET, FILM COATED ORAL 2 TIMES DAILY PRN
Status: DISCONTINUED | OUTPATIENT
Start: 2024-04-26 | End: 2024-04-26

## 2024-04-25 RX ORDER — ENOXAPARIN SODIUM 100 MG/ML
40 INJECTION SUBCUTANEOUS DAILY
Status: DISCONTINUED | OUTPATIENT
Start: 2024-04-25 | End: 2024-04-27 | Stop reason: HOSPADM

## 2024-04-25 RX ADMIN — BUDESONIDE 0.5 MG: 0.5 INHALANT RESPIRATORY (INHALATION) at 12:32

## 2024-04-25 RX ADMIN — ALBUTEROL SULFATE 2.5 MG: 2.5 SOLUTION RESPIRATORY (INHALATION) at 19:35

## 2024-04-25 RX ADMIN — PANTOPRAZOLE SODIUM 40 MG: 40 TABLET, DELAYED RELEASE ORAL at 18:10

## 2024-04-25 RX ADMIN — AZITHROMYCIN DIHYDRATE 500 MG: 250 TABLET ORAL at 18:10

## 2024-04-25 RX ADMIN — IPRATROPIUM BROMIDE 1 MG: 0.5 SOLUTION RESPIRATORY (INHALATION) at 08:37

## 2024-04-25 RX ADMIN — ENOXAPARIN SODIUM 40 MG: 40 INJECTION SUBCUTANEOUS at 14:24

## 2024-04-25 RX ADMIN — ISODIUM CHLORIDE 12 ML: 0.03 SOLUTION RESPIRATORY (INHALATION) at 08:38

## 2024-04-25 RX ADMIN — ALBUTEROL SULFATE 10 MG: 2.5 SOLUTION RESPIRATORY (INHALATION) at 08:37

## 2024-04-25 RX ADMIN — FORMOTEROL FUMARATE DIHYDRATE 20 MCG: 20 SOLUTION RESPIRATORY (INHALATION) at 19:35

## 2024-04-25 RX ADMIN — LOSARTAN POTASSIUM 25 MG: 25 TABLET, FILM COATED ORAL at 07:08

## 2024-04-25 RX ADMIN — BRIMONIDINE TARTRATE 1 DROP: 2 SOLUTION OPHTHALMIC at 14:25

## 2024-04-25 RX ADMIN — QUETIAPINE FUMARATE 25 MG: 25 TABLET ORAL at 10:33

## 2024-04-25 RX ADMIN — IPRATROPIUM BROMIDE AND ALBUTEROL SULFATE 3 ML: 2.5; .5 SOLUTION RESPIRATORY (INHALATION) at 12:33

## 2024-04-25 RX ADMIN — METHYLPREDNISOLONE SODIUM SUCCINATE 125 MG: 125 INJECTION, POWDER, FOR SOLUTION INTRAMUSCULAR; INTRAVENOUS at 08:01

## 2024-04-25 NOTE — ASSESSMENT & PLAN NOTE
O2 supplementation at 2 L at baseline.- non always adherent.   CPAP at night - non adherent   Flu/Covid/ RSV swab negative.   S/P solumedrol 125 mg IV in the ED.     Plan:  - Continue Azithromycin 250 mg daily for 2 more days  - Prednisone 40 mg daily for 2 more days  - Continue home med: Duoneb TID, Albuterol as needed, budesonide BID.  - F/u with Pulmonology

## 2024-04-25 NOTE — NURSING NOTE
Aldeno awakened and needing to pee. He stated he was hungry. Dysphagia screen redone and he passed. Dr. Garcia notified and dietary notified of status change. Diet ordered. Will continue to monitor.

## 2024-04-25 NOTE — ED NOTES
Assumed care for pt. Pt currently resting on stretcher with wife at bedside.      Christiana Harris RN  04/25/24 [id number]

## 2024-04-25 NOTE — ED NOTES
Patient initially on 6 liters nasal cannula with saturations up to 99% - gradually decreased to 3.5 liters and presently with saturations 94%. Remains tachypneic but appears non labored. Monitor - sinus rhythm.      Cat Cunningham RN  04/25/24 0702

## 2024-04-25 NOTE — H&P
History and Physical - [facility]    Patient Information: Severiano Feliciano 88 y.o. male MRN: [id number]  Unit/Bed#: 7T [facility] 703-01 Encounter: [id number]  Admitting Physician: Rebekah Garcia MD  PCP: Kirby Casanova MD  Date of Admission:  04/25/24    Assessment and Plan    * Acute on chronic respiratory failure with hypoxia and hypercapnia (HCC)  Assessment & Plan  O2 at home 2-3 L   Currently on nasal canula FiO2 30 %. 3 L   S/p high flow in the ED  ABG: ph 7.357 , CO2 75.1 - elevated CO2 is a chronic issue.   Secondary to COPD exacerbation, non adherence to CPAP at night.     Plan:  - Continue O2 supplementation with nasal canula   - Consider BiPAP for short period of time- with caution due to hx of vomiting and aspiration while on BiPAP.   - COPD management.        Delirium  Assessment & Plan  Patient woke up confused and delirious this morning.   While in the ED - very difficult to redirect by wife at bedside.   Differential diagnosis include: hypercapnia vs hypoxic injury vs infection   S/p Seroquel 25 mg daily     Plan:  - Delirium precautions  - Seroquel PRN 25 mg daily   - BiPAP to be considered for reducing CO2 as a possible cause  - O2 supplementation   - Fall precautions.   - Dysphagia diet     COPD exacerbation (HCC)  Assessment & Plan  O2 supplementation at 2 L at baseline.- non always adherent.   CPAP at night - non adherent    Past 3 days increased cough and sputum production   Flu/Covid/ RSV swab negative.   S/P solumedrol 125 mg IV in the ED.     Plan:  - Azithromycin 500 mg once today and 250 mg daily for additional 4 days.   - Prednisone 40 mg daily starting tomorrow.   - RT protocol  - Continue home med: Duoneb TID, Albuterol as needed, budesonide BID.    Benign essential hypertension  Assessment & Plan  BP Readings from Last 3 Encounters:   04/25/24 (!) 181/97   03/25/24 140/80   02/21/24 (!) 174/89    Above goal.   BP medication at home: losartan 25 mg.   Likely due to  "distress and agitation    Plan:  - continue losartan 25 mg daily, - consider increasing dose if BP not controlled.     Bilateral hearing loss  Assessment & Plan  Patient has difficulty hearing during encounter   Conductive and sensoneural hearing loss, last ENT visit 3/18/24         VTE Prophylaxis: VTE Score: 7 High Risk (Score >/= 5) - Pharmacological DVT Prophylaxis Ordered: enoxaparin (Lovenox). Sequential Compression Devices Ordered.  Code Status: Level 1 - Full Code  Anticipated Length of Stay:  Patient will be admitted on an Inpatient basis with an anticipated length of stay of  more than 2 midnights.     Justification for Hospital Stay: COPD exacerbation   Total Time for Visit, including Counseling / Coordination of Care: 45 mins. Greater than 50% of this total time spent on direct patient counseling and coordination of care.  Patient Information Sharing: With the consent of Severiano Feliciano , their loved ones (wife Sandy) were notified today by inpatient team of the patient’s condition and current plan.  All questions answered.     Chief Complaint:   confusion   Chief Complaint   Patient presents with    Decreased Oxygen Level     Pt's family called [facility] after witnessing pt's O2 de-satting to the 60s.  [facility] placed pt on 6L O2 en route via nasal cannula. Pt is on home oxygen, wife states 2L O2 @ home. Pt is not verbal during triage. [facility] stated \"family says the pt gets confused when experiencing low O2\"     History of Present Illness:    Severiano Feliciano is a 88 y.o. male with hx of COPD, former smoker, HTN, bilateral hearing loss who presents with confusion. Patient woke up this morning confused, patient was complaining for his mother who is passed away many years ago, walking around in an agitated state, wife checked his blood pressure with that time was 88 he was also not wearing oxygen saturation at that time was low (his wife is unable to provide a number).   Patient at baseline uses 2 to 3 L of " "oxygen nasal cannula he was instructed to also use \" a mask\" nighttime adherent.  Patient is sometimes forgetful however this is not his baseline per wife history.   Patient admitted in December for COPD excessive patient and while placed on BiPAP patient vomited and for which he required intubation.  Since then patient is afraid of using BiPAP/CPAP.  Wife is his primary caregiver, they also have a son that is frequently  ED course:  Vital signs: Respiratory rate 27, pulse 211/102, SpO2 89% O2 6 L/min high flow  Labs: Abg: Ph 7.335, pCO2 75.1 , BMP: HCO3  >45, Hb 11 .6  Imaging: Chest X ray : Emphysema, right basilar atelectasis.   Interventions: duoneb, solumedrol 125 mg , losartan 25 mg, seroquel 25 mg   When assessed patient was visibly agitated and difficult to redirect.  Unable to provide history, wife was at bedside and she was able to redirect him somewhat.  Patient continues to remove his high flow nasal cannula and had repetitive speech asking to speak to his son over the phone multiple times.  Patient admitted for acute on chronic respiratory failure due to COPD exacerbation.      Review of Systems:  Review of Systems   Unable to perform ROS: Mental status change (wife was able to provide some answers.)   Gastrointestinal:  Negative for abdominal distention, constipation, diarrhea and vomiting.   Genitourinary:  Positive for frequency.   Psychiatric/Behavioral:  Positive for agitation and confusion. The patient is nervous/anxious.        Past Medical and Surgical History:   Past Medical History:   Diagnosis Date    Acute metabolic encephalopathy 06/13/2020    Age-related cataract of right eye 04/04/2023    patient is medically cleared and presents with low risk of complication with this upcoming R cataract surgery.    Anemia     Aneurysm, aorta, thoracic (HCC)     Appendicolith     Ascending aortic aneurysm (HCC)     3.7    Asthma     BPH (benign prostatic hyperplasia)     CAD (coronary artery disease)     " noted on CT scan    CHF (congestive heart failure) (HCC)     COPD (chronic obstructive pulmonary disease) (HCC)     Descending thoracic aortic aneurysm (HCC)     Diabetes mellitus (HCC)     Diverticulosis     Former tobacco use     GERD (gastroesophageal reflux disease)     History of DVT (deep vein thrombosis)     Left leg    History of transfusion     Hypertension     Hypoxemia 04/30/2023    Inguinal hernia     right    Nephrolithiasis     Oxygen dependent     2LNC    Oxygen dependent     Pneumonia     Pre-diabetes     Prostate calculus     PVD (peripheral vascular disease) (HCC)     Recurrent right inguinal hernia w incarcertion 01/04/2023    Thoracic aortic aneurysm without rupture (HCC) 11/19/2018    Added automatically from request for surgery [id number]    Thrombocytopenia (HCC) 12/29/2018    Ulcer     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)     Varicose vein of leg     b/l     Past Surgical History:   Procedure Laterality Date    CARDIAC SURGERY      ESOPHAGOGASTRODUODENOSCOPY      HERNIA REPAIR Right 1/21/2019    Procedure: REPAIR HERNIA INGUINAL WITH MESH;  Surgeon: David Lowry MD;  Location:  MAIN OR;  Service: General    HERNIA REPAIR Right 7/22/2023    Procedure: REPAIR RECURRENT INCARERATED HERNIA INGUINAL OPEN, RIGHT ORCHIECTOMY;  Surgeon: Mason Bertrand MD;  Location: [facility];  Service: General    INGUINAL HERNIA REPAIR Bilateral     IR TEVAR  12/27/2018    OH EVASC RPR DTA COVERAGE ART ORIGIN 1ST ENDOPROSTH N/A 12/27/2018    Procedure: TEVAR - endovascular thoracic aortic aneurysm repair;  Surgeon: Gladis Ortega MD;  Location:  MAIN OR;  Service: Vascular    THORACIC AORTIC ANEURYSM REPAIR  12/27/2018    VARICOSE VEIN SURGERY Bilateral     vein stripping     Meds/Allergies:  Allergies:   Allergies   Allergen Reactions    Penicillins Hives, Itching and Rash    Lisinopril Rash     Side pains and rash     Zyprexa [Olanzapine] Tongue Swelling     Prior to Admission Medications    Prescriptions Last Dose Informant Patient Reported? Taking?   acetaminophen (TYLENOL) 650 mg CR tablet Past Week  No Yes   Sig: Take 1 tablet (650 mg total) by mouth every 8 (eight) hours as needed for mild pain   brimonidine tartrate 0.2 % ophthalmic solution 4/24/2024  Yes Yes   Sig: INSTILL 1 DROP IN LEFT EYE TWICE DAILY INSTILL 1 DROP IN LEFT EYE DOS VECES AL EULALIO   budesonide (Pulmicort) 0.5 mg/2 mL nebulizer solution 4/24/2024  No Yes   Sig: Take 2 mL (0.5 mg total) by nebulization 2 (two) times a day Rinse mouth after use.   fluticasone-umeclidinium-vilanterol (Trelegy Ellipta) 200-62.5-25 mcg/actuation AEPB inhaler 4/24/2024  No Yes   Sig: INHALE 1 PUFF DAILY RINSE MOUTH AFTER USE.   formoterol (PERFOROMIST) 20 MCG/2ML nebulizer solution 4/24/2024  No Yes   Sig: Take 2 mL (20 mcg total) by nebulization 2 (two) times a day   ipratropium-albuterol (DUO-NEB) 0.5-2.5 mg/3 mL nebulizer solution 4/24/2024  No Yes   Sig: Take 3 mL by nebulization 2 (two) times a day   losartan (COZAAR) 25 mg tablet 4/24/2024  No Yes   Sig: TAKE ONE TABLET BY MOUTH TWICE DAILYTOMAR 1 TABLETA POR VIA ORAL DOS VECES AL EULALIO   mometasone (ELOCON) 0.1 % lotion Past Week  No Yes   Sig: Apply topically daily   pantoprazole (PROTONIX) 40 mg tablet 4/24/2024  No Yes   Sig: Take 1 tablet (40 mg total) by mouth daily   predniSONE 5 mg tablet 4/25/2024  No Yes   Sig: Take 1 tablet (5 mg total) by mouth daily   senna-docusate sodium (SENOKOT S) 8.6-50 mg per tablet Past Week  No Yes   Sig: Take 1 tablet by mouth daily      Facility-Administered Medications: None     Social History:     Social History     Socioeconomic History    Marital status: /Civil Union     Spouse name: Not on file    Number of children: Not on file    Years of education: Not on file    Highest education level: Not on file   Occupational History    Not on file   Tobacco Use    Smoking status: Former     Current packs/day: 0.00     Average packs/day: 1 pack/day for  35.0 years (35.0 ttl pk-yrs)     Types: Cigarettes     Start date:      Quit date:      Years since quittin.3    Smokeless tobacco: Never   Vaping Use    Vaping status: Never Used   Substance and Sexual Activity    Alcohol use: Never    Drug use: No    Sexual activity: Not Currently     Partners: Female   Other Topics Concern    Not on file   Social History Narrative    ** Merged History Encounter **          Social Determinants of Health     Financial Resource Strain: High Risk (10/25/2023)    Overall Financial Resource Strain (CARDIA)     Difficulty of Paying Living Expenses: Very hard   Food Insecurity: No Food Insecurity (2024)    Hunger Vital Sign     Worried About Running Out of Food in the Last Year: Never true     Ran Out of Food in the Last Year: Never true   Transportation Needs: No Transportation Needs (2024)    PRAPARE - Transportation     Lack of Transportation (Medical): No     Lack of Transportation (Non-Medical): No   Recent Concern: Transportation Needs - Unmet Transportation Needs (10/25/2023)    PRAPARE - Transportation     Lack of Transportation (Medical): Yes     Lack of Transportation (Non-Medical): Yes   Physical Activity: Inactive (2020)    Received from [facility]    Exercise Vital Sign     Days of Exercise per Week: 0 days     Minutes of Exercise per Session: 0 min   Stress: No Stress Concern Present (2020)    Received from [facility]    [place] [employer] - Occupational Stress Questionnaire     Feeling of Stress : Only a little   Social Connections: Moderately Integrated (2020)    Received from [facility]    Social Connection and Isolation Panel [NHANES]     Frequency of Communication with Friends and Family: More than three times a week     Frequency of Social Gatherings with Friends and Family: More than three times a week     Attends Restorationism Services: 1 to 4 times per year  "    Active Member of Clubs or Organizations: No     Attends Club or Organization Meetings: Never     Marital Status:    Intimate Partner Violence: Not At Risk (5/19/2020)    Received from [facility]    Humiliation, Afraid, Rape, and Kick questionnaire     Fear of Current or Ex-Partner: No     Emotionally Abused: No     Physically Abused: No     Sexually Abused: No   Housing Stability: Low Risk  (1/4/2024)    Housing Stability Vital Sign     Unable to Pay for Housing in the Last Year: No     Number of Places Lived in the Last Year: 1     Unstable Housing in the Last Year: No     Patient Pre-hospital Living Situation: home with wife   Patient Pre-hospital Level of Mobility: with a cane  Patient Pre-hospital Diet Restrictions: Low-sodium diet    Family History:  Family History   Problem Relation Age of Onset    Tuberculosis Mother     No Known Problems Father     Cancer Sister     Diabetes Family     Hypertension Family        Physical Exam:   Vitals:   Blood Pressure: 164/100 (04/25/24 1502)  Pulse: 78 (04/25/24 1502)  Temperature: 98.4 °F (36.9 °C) (04/25/24 1502)  Temp Source: Temporal (04/25/24 1502)  Respirations: 20 (04/25/24 1502)  Height: 5' 3\" (160 cm) (04/25/24 1154)  Weight - Scale: 60.1 kg (132 lb 9 oz) (04/25/24 1154)  SpO2: 92 % (04/25/24 1502)    Physical Exam  Constitutional:       Appearance: Normal appearance.   HENT:      Head: Normocephalic and atraumatic.      Right Ear: External ear normal.      Left Ear: External ear normal.      Nose: Nose normal.      Mouth/Throat:      Mouth: Mucous membranes are dry.   Eyes:      General: No scleral icterus.  Cardiovascular:      Rate and Rhythm: Normal rate and regular rhythm.      Pulses: Normal pulses.      Heart sounds: No murmur heard.     No friction rub.   Pulmonary:      Effort: Respiratory distress present.      Breath sounds: No wheezing or rales.      Comments: Increased work of breathing, diminished lung sounds " bilaterally  Abdominal:      General: Bowel sounds are normal. There is no distension.      Tenderness: There is no abdominal tenderness. There is no guarding.   Musculoskeletal:         General: Normal range of motion.      Cervical back: Normal range of motion and neck supple.      Right lower leg: No edema.      Left lower leg: No edema.   Skin:     General: Skin is warm and dry.      Coloration: Skin is not jaundiced.      Findings: Rash (Seborrhea on scalp) present.   Neurological:      General: No focal deficit present.      Mental Status: He is alert.   Psychiatric:      Comments: Delirium         Lab Results: I have personally reviewed pertinent reports.    Results from last 7 days   Lab Units 04/25/24  0646   WBC Thousand/uL 7.18   HEMOGLOBIN g/dL 11.6*   HEMATOCRIT % 38.3   PLATELETS Thousands/uL 117*   SEGS PCT % 72   LYMPHO PCT % 14   MONO PCT % 7   EOS PCT % 7*     Results from last 7 days   Lab Units 04/25/24  0646   POTASSIUM mmol/L 4.3   CHLORIDE mmol/L 96   CO2 mmol/L >45*   BUN mg/dL 23   CREATININE mg/dL 0.90   CALCIUM mg/dL 9.8   ALK PHOS U/L 61   ALT U/L 10   AST U/L 16   EGFR ml/min/1.73sq m 77     Results from last 7 days   Lab Units 04/25/24  0646   INR  0.95                      Results from last 7 days   Lab Units 04/25/24  0836   COLOR UA  Straw   CLARITY UA  Clear   SPEC GRAV UA  1.010   PH UA  8.0   LEUKOCYTES UA  Negative   NITRITE UA  Negative   GLUCOSE UA mg/dl Negative   KETONES UA mg/dl Negative   BILIRUBIN UA  Negative   BLOOD UA  Negative             Imaging: I have personally reviewed pertinent reports.    XR chest 1 view portable    Result Date: 4/25/2024  Narrative: XR CHEST PORTABLE INDICATION: hypoxia. COMPARISON: 2/21/2024 FINDINGS: There is persistent right basilar atelectasis or scarring. Asymmetrically prominent markings in the right lung compared with the left seems stable compared with the prior study. There is emphysema. This was better demonstrated on prior CT scan.  No pneumothorax or pleural effusion. Cardiomediastinal silhouette seems stable. Endovascular repair of the aorta is again noted. There is some arthritis of the shoulders which is mild. Normal upper abdomen.     Impression: There is emphysema. There is persistent right basilar atelectasis or scarring and mild asymmetric prominence of the lung markings on the right versus the left which seems stable. No significant new abnormality appreciated Workstation performed: [id number]       EKG, Pathology, and Other Studies Reviewed on Admission:   EKG  Result Date: 04/25/24  Impression: Normal sinus rhythm with occasional PVC    Entire H&P was discussed with Dr. Henry who agreed to what is noted above    Rebekah Garcia MD  04/25/24  4:26 PM

## 2024-04-25 NOTE — ED PROVIDER NOTES
"History  Chief Complaint   Patient presents with    Decreased Oxygen Level     Pt's family called [facility] after witnessing pt's O2 de-satting to the 60s.  [facility] placed pt on 6L O2 en route via nasal cannula. Pt is on home oxygen, wife states 2L O2 @ home. Pt is not verbal during triage. [facility] stated \"family says the pt gets confused when experiencing low O2\"     88-year-old gentleman presents with report of low oxygen levels.  His family reports that he is oxygen dependent due to history of COPD and despite wearing his oxygen concentrator he was found to have sats in the 60s.  When he becomes hypoxemic, the patient becomes somewhat confused which is his presentation at this time.  There is no report of fevers or URI symptoms.  Sats improved with O2 provided by EMS.  The patient is unable to provide any meaningful history and family provided minimal history as well.      Medical Problem  Quality:  Hypoxia  Severity:  Severe  Onset quality:  Unable to specify  Timing:  Constant  Progression:  Unchanged  Chronicity:  Recurrent  Relieved by:  Supplemental O2  Worsened by:  Nothing  Associated symptoms: no chest pain, no fever and no vomiting        Prior to Admission Medications   Prescriptions Last Dose Informant Patient Reported? Taking?   acetaminophen (TYLENOL) 650 mg CR tablet Past Week  No Yes   Sig: Take 1 tablet (650 mg total) by mouth every 8 (eight) hours as needed for mild pain   brimonidine tartrate 0.2 % ophthalmic solution 4/24/2024  Yes Yes   Sig: INSTILL 1 DROP IN LEFT EYE TWICE DAILY INSTILL 1 DROP IN LEFT EYE DOS VECES AL EULALIO   budesonide (Pulmicort) 0.5 mg/2 mL nebulizer solution 4/24/2024  No Yes   Sig: Take 2 mL (0.5 mg total) by nebulization 2 (two) times a day Rinse mouth after use.   fluticasone-umeclidinium-vilanterol (Trelegy Ellipta) 200-62.5-25 mcg/actuation AEPB inhaler 4/24/2024  No Yes   Sig: INHALE 1 PUFF DAILY RINSE MOUTH AFTER USE.   formoterol (PERFOROMIST) 20 MCG/2ML nebulizer solution " 4/24/2024  No Yes   Sig: Take 2 mL (20 mcg total) by nebulization 2 (two) times a day   ipratropium-albuterol (DUO-NEB) 0.5-2.5 mg/3 mL nebulizer solution 4/24/2024  No Yes   Sig: Take 3 mL by nebulization 2 (two) times a day   losartan (COZAAR) 25 mg tablet 4/24/2024  No Yes   Sig: TAKE ONE TABLET BY MOUTH TWICE DAILYTOMAR 1 TABLETA POR VIA ORAL DOS VECES AL EULALIO   mometasone (ELOCON) 0.1 % lotion Past Week  No Yes   Sig: Apply topically daily   pantoprazole (PROTONIX) 40 mg tablet 4/24/2024  No Yes   Sig: Take 1 tablet (40 mg total) by mouth daily   predniSONE 5 mg tablet 4/25/2024  No Yes   Sig: Take 1 tablet (5 mg total) by mouth daily   senna-docusate sodium (SENOKOT S) 8.6-50 mg per tablet Past Week  No Yes   Sig: Take 1 tablet by mouth daily      Facility-Administered Medications: None       Past Medical History:   Diagnosis Date    Acute metabolic encephalopathy 06/13/2020    Age-related cataract of right eye 04/04/2023    patient is medically cleared and presents with low risk of complication with this upcoming R cataract surgery.    Anemia     Aneurysm, aorta, thoracic (HCC)     Appendicolith     Ascending aortic aneurysm (HCC)     3.7    Asthma     BPH (benign prostatic hyperplasia)     CAD (coronary artery disease)     noted on CT scan    CHF (congestive heart failure) (HCC)     COPD (chronic obstructive pulmonary disease) (HCC)     Descending thoracic aortic aneurysm (HCC)     Diabetes mellitus (HCC)     Diverticulosis     Former tobacco use     GERD (gastroesophageal reflux disease)     History of DVT (deep vein thrombosis)     Left leg    History of transfusion     Hypertension     Hypoxemia 04/30/2023    Inguinal hernia     right    Nephrolithiasis     Oxygen dependent     2LNC    Oxygen dependent     Pneumonia     Pre-diabetes     Prostate calculus     PVD (peripheral vascular disease) (HCC)     Recurrent right inguinal hernia w incarcertion 01/04/2023    Thoracic aortic aneurysm without rupture  (HCC) 2018    Added automatically from request for surgery [id number]    Thrombocytopenia (HCC) 2018    Ulcer     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)     Varicose vein of leg     b/l       Past Surgical History:   Procedure Laterality Date    CARDIAC SURGERY      ESOPHAGOGASTRODUODENOSCOPY      HERNIA REPAIR Right 2019    Procedure: REPAIR HERNIA INGUINAL WITH MESH;  Surgeon: David Lowry MD;  Location:  MAIN OR;  Service: General    HERNIA REPAIR Right 2023    Procedure: REPAIR RECURRENT INCARERATED HERNIA INGUINAL OPEN, RIGHT ORCHIECTOMY;  Surgeon: Mason Bertrand MD;  Location: [facility];  Service: General    INGUINAL HERNIA REPAIR Bilateral     IR TEVAR  2018    TN EVASC RPR DTA COVERAGE ART ORIGIN 1ST ENDOPROSTH N/A 2018    Procedure: TEVAR - endovascular thoracic aortic aneurysm repair;  Surgeon: Gladis Ortega MD;  Location: [facility];  Service: Vascular    THORACIC AORTIC ANEURYSM REPAIR  2018    VARICOSE VEIN SURGERY Bilateral     vein stripping       Family History   Problem Relation Age of Onset    Tuberculosis Mother     No Known Problems Father     Cancer Sister     Diabetes Family     Hypertension Family      I have reviewed and agree with the history as documented.    E-Cigarette/Vaping    E-Cigarette Use Never User      E-Cigarette/Vaping Substances    Nicotine No     THC No     CBD No     Flavoring No     Other No     Unknown No      Social History     Tobacco Use    Smoking status: Former     Current packs/day: 0.00     Average packs/day: 1 pack/day for 35.0 years (35.0 ttl pk-yrs)     Types: Cigarettes     Start date:      Quit date:      Years since quittin.3    Smokeless tobacco: Never   Vaping Use    Vaping status: Never Used   Substance Use Topics    Alcohol use: Never    Drug use: No       Review of Systems   Unable to perform ROS: Mental status change   Constitutional:  Negative for fever.   Cardiovascular:  Negative  for chest pain.   Gastrointestinal:  Negative for vomiting.       Physical Exam  Physical Exam  Vitals and nursing note reviewed.   Constitutional:       General: He is not in acute distress.     Appearance: Normal appearance. He is well-developed. He is not ill-appearing, toxic-appearing or diaphoretic.   HENT:      Head: Normocephalic and atraumatic.      Right Ear: External ear normal.      Left Ear: External ear normal.      Nose: Nose normal.      Mouth/Throat:      Mouth: Mucous membranes are moist.      Pharynx: Oropharynx is clear.   Eyes:      Conjunctiva/sclera: Conjunctivae normal.      Pupils: Pupils are equal, round, and reactive to light.   Cardiovascular:      Rate and Rhythm: Normal rate and regular rhythm.      Heart sounds: Normal heart sounds.   Pulmonary:      Effort: Pulmonary effort is normal. No respiratory distress.   Abdominal:      General: Bowel sounds are normal. There is no distension.      Palpations: Abdomen is soft.      Tenderness: There is no abdominal tenderness. There is no guarding.   Musculoskeletal:         General: Normal range of motion.      Cervical back: Neck supple. No rigidity.      Right lower leg: No edema.      Left lower leg: No edema.   Skin:     General: Skin is warm and dry.      Capillary Refill: Capillary refill takes less than 2 seconds.   Neurological:      General: No focal deficit present.      Mental Status: He is alert and oriented to person, place, and time.   Psychiatric:         Mood and Affect: Mood normal.         Behavior: Behavior normal.         Vital Signs  ED Triage Vitals   Temperature Pulse Respirations Blood Pressure SpO2   04/25/24 0622 04/25/24 0622 04/25/24 0622 04/25/24 0622 04/25/24 0622   98.8 °F (37.1 °C) 89 (!) 27 (!) 211/102 (!) 89 %      Temp Source Heart Rate Source Patient Position - Orthostatic VS BP Location FiO2 (%)   04/25/24 0622 04/25/24 0622 04/25/24 0622 04/25/24 0622 04/25/24 2115   Oral Monitor Lying Left arm 50      Pain  Score       04/25/24 0800       No Pain           Vitals:    04/25/24 1030 04/25/24 1141 04/25/24 1154 04/25/24 1502   BP: (!) 186/104 133/80 (!) 181/97 164/100   Pulse: 83 80 88 78   Patient Position - Orthostatic VS: Lying  Lying Lying         Visual Acuity      ED Medications  Medications   brimonidine tartrate 0.2 % ophthalmic solution 1 drop (1 drop Both Eyes Given 4/25/24 1425)   predniSONE tablet 40 mg (has no administration in time range)   umeclidinium 62.5 mcg/actuation inhaler AEPB 1 puff (has no administration in time range)   losartan (COZAAR) tablet 25 mg (has no administration in time range)   pantoprazole (PROTONIX) EC tablet 40 mg (40 mg Oral Given 4/25/24 1810)   acetaminophen (TYLENOL) tablet 325 mg (has no administration in time range)   enoxaparin (LOVENOX) subcutaneous injection 40 mg (40 mg Subcutaneous Given 4/25/24 1424)   melatonin tablet 3 mg (has no administration in time range)   formoterol (PERFOROMIST) nebulizer solution 20 mcg (20 mcg Nebulization Given 4/25/24 1935)   budesonide (PULMICORT) inhalation solution 0.5 mg ( Nebulization Canceled Entry 4/25/24 1812)   azithromycin (ZITHROMAX) tablet 250 mg (has no administration in time range)   QUEtiapine (SEROquel) tablet 12.5 mg (has no administration in time range)   ipratropium-albuterol (DUO-NEB) 0.5-2.5 mg/3 mL inhalation solution 3 mL (has no administration in time range)   albuterol inhalation solution 2.5 mg (2.5 mg Nebulization Given 4/25/24 1935)   losartan (COZAAR) tablet 25 mg (25 mg Oral Given 4/25/24 0708)   albuterol inhalation solution 10 mg (10 mg Nebulization Given 4/25/24 0837)   ipratropium (ATROVENT) 0.02 % inhalation solution 1 mg (1 mg Nebulization Given 4/25/24 0837)   sodium chloride 0.9 % inhalation solution 12 mL (12 mL Nebulization Given 4/25/24 0838)   methylPREDNISolone sodium succinate (Solu-MEDROL) injection 125 mg (125 mg Intravenous Given 4/25/24 0801)   azithromycin (ZITHROMAX) tablet 500 mg (500 mg  Oral Given 4/25/24 1810)       Diagnostic Studies  Results Reviewed       Procedure Component Value Units Date/Time    TSH, 3rd generation with Free T4 reflex [[id number]]  (Abnormal) Collected: 04/25/24 0646    Lab Status: Final result Specimen: Blood from Arm, Right Updated: 04/25/24 2148     TSH 3RD GENERATON 0.258 uIU/mL     T4, free [[id number]] Collected: 04/25/24 0646    Lab Status: In process Specimen: Blood from Arm, Right Updated: 04/25/24 2148    Blood culture #2 [[id number]] Collected: 04/25/24 0646    Lab Status: Preliminary result Specimen: Blood from Arm, Right Updated: 04/25/24 1502     Blood Culture Received in Microbiology Lab. Culture in Progress.    Blood culture #1 [[id number]] Collected: 04/25/24 0646    Lab Status: Preliminary result Specimen: Blood from Arm, Right Updated: 04/25/24 1502     Blood Culture Received in Microbiology Lab. Culture in Progress.    HS Troponin I 2hr [[id number]]  (Normal) Collected: 04/25/24 0913    Lab Status: Final result Specimen: Blood from Arm, Right Updated: 04/25/24 0959     hs TnI 2hr 28 ng/L      Delta 2hr hsTnI 3 ng/L     UA (URINE) with reflex to Scope [[id number]]  (Normal) Collected: 04/25/24 0836    Lab Status: Final result Specimen: Urine, Clean Catch Updated: 04/25/24 0844     Color, UA Straw     Clarity, UA Clear     Specific Gravity, UA 1.010     pH, UA 8.0     Leukocytes, UA Negative     Nitrite, UA Negative     Protein, UA Negative mg/dl      Glucose, UA Negative mg/dl      Ketones, UA Negative mg/dl      Bilirubin, UA Negative     Occult Blood, UA Negative     UROBILINOGEN UA Negative mg/dL     Blood gas, arterial [[id number]]  (Abnormal) Collected: 04/25/24 0732    Lab Status: Final result Specimen: Blood, Arterial from Radial, Left Updated: 04/25/24 0746     pH, Arterial 7.357     pCO2, Arterial 75.1 mm Hg      pO2, Arterial 76.7 mm Hg      HCO3, Arterial 41.2 mmol/L      Base Excess, Arterial 12.6 mmol/L      O2 Content, Arterial 16.6 mL/dL      O2  HGB,Arterial  93.4 %      SOURCE Radial, Left     AVINASH TEST Yes     Nasal Cannula 3    FLU/RSV/COVID - if FLU/RSV clinically relevant [[id number]]  (Normal) Collected: 04/25/24 0646    Lab Status: Final result Specimen: Nares from Nose Updated: 04/25/24 0732     SARS-CoV-2 Negative     INFLUENZA A PCR Negative     INFLUENZA B PCR Negative     RSV PCR Negative    Narrative:      FOR PEDIATRIC PATIENTS - copy/paste COVID Guidelines URL to browser: [email or web address]    SARS-CoV-2 assay is a Nucleic Acid Amplification assay intended for the  qualitative detection of nucleic acid from SARS-CoV-2 in nasopharyngeal  swabs. Results are for the presumptive identification of SARS-CoV-2 RNA.    Positive results are indicative of infection with SARS-CoV-2, the virus  causing COVID-19, but do not rule out bacterial infection or co-infection  with other viruses. Laboratories within the [place] and its  territories are required to report all positive results to the appropriate  public health authorities. Negative results do not preclude SARS-CoV-2  infection and should not be used as the sole basis for treatment or other  patient management decisions. Negative results must be combined with  clinical observations, patient history, and epidemiological information.  This test has not been FDA cleared or approved.    This test has been authorized by FDA under an Emergency Use Authorization  (EUA). This test is only authorized for the duration of time the  declaration that circumstances exist justifying the authorization of the  emergency use of an in vitro diagnostic tests for detection of SARS-CoV-2  virus and/or diagnosis of COVID-19 infection under section 564(b)(1) of  the Act, 21 U.S.C. 360bbb-3(b)(1), unless the authorization is terminated  or revoked sooner. The test has been validated but independent review by FDA  and CLIA is pending.    Test performed using  [facility] GeneXpert: This RT-PCR assay targets N2,  a region unique to SARS-CoV-2. A conserved region in the E-gene was chosen  for pan-Sarbecovirus detection which includes SARS-CoV-2.    According to CMS-2020-01-R, this platform meets the definition of high-throughput technology.    Comprehensive metabolic panel [[id number]]  (Abnormal) Collected: 04/25/24 0646    Lab Status: Final result Specimen: Blood from Arm, Right Updated: 04/25/24 0731     Sodium 146 mmol/L      Potassium 4.3 mmol/L      Chloride 96 mmol/L      CO2 >45 mmol/L      ANION GAP --     BUN 23 mg/dL      Creatinine 0.90 mg/dL      Glucose 125 mg/dL      Calcium 9.8 mg/dL      AST 16 U/L      ALT 10 U/L      Alkaline Phosphatase 61 U/L      Total Protein 6.9 g/dL      Albumin 3.8 g/dL      Total Bilirubin 0.34 mg/dL      eGFR 77 ml/min/1.73sq m     Narrative:      National Kidney Disease Foundation guidelines for Chronic Kidney Disease (CKD):     Stage 1 with normal or high GFR (GFR > 90 mL/min/1.73 square meters)    Stage 2 Mild CKD (GFR = 60-89 mL/min/1.73 square meters)    Stage 3A Moderate CKD (GFR = 45-59 mL/min/1.73 square meters)    Stage 3B Moderate CKD (GFR = 30-44 mL/min/1.73 square meters)    Stage 4 Severe CKD (GFR = 15-29 mL/min/1.73 square meters)    Stage 5 End Stage CKD (GFR <15 mL/min/1.73 square meters)  Note: GFR calculation is accurate only with a steady state creatinine    B-Type Natriuretic Peptide(BNP) [[id number]]  (Abnormal) Collected: 04/25/24 0646    Lab Status: Final result Specimen: Blood from Arm, Right Updated: 04/25/24 0721      pg/mL     HS Troponin 0hr (reflex protocol) [[id number]]  (Normal) Collected: 04/25/24 0646    Lab Status: Final result Specimen: Blood from Arm, Right Updated: 04/25/24 0719     hs TnI 0hr 25 ng/L     Protime-INR [[id number]]  (Normal) Collected: 04/25/24 0646    Lab Status: Final result Specimen: Blood from Arm, Right Updated: 04/25/24 0704     Protime 13.0 seconds      INR 0.95    APTT  [[id number]]  (Normal) Collected: 04/25/24 0646    Lab Status: Final result Specimen: Blood from Arm, Right Updated: 04/25/24 0704     PTT 28 seconds     CBC and differential [[id number]]  (Abnormal) Collected: 04/25/24 0646    Lab Status: Final result Specimen: Blood from Heel, Left Updated: 04/25/24 0654     WBC 7.18 Thousand/uL      RBC 3.58 Million/uL      Hemoglobin 11.6 g/dL      Hematocrit 38.3 %       fL      MCH 32.4 pg      MCHC 30.3 g/dL      RDW 12.5 %      MPV 9.4 fL      Platelets 117 Thousands/uL      nRBC 0 /100 WBCs      Segmented % 72 %      Immature Grans % 0 %      Lymphocytes % 14 %      Monocytes % 7 %      Eosinophils Relative 7 %      Basophils Relative 0 %      Absolute Neutrophils 5.05 Thousands/µL      Absolute Immature Grans 0.02 Thousand/uL      Absolute Lymphocytes 1.02 Thousands/µL      Absolute Monocytes 0.53 Thousand/µL      Eosinophils Absolute 0.53 Thousand/µL      Basophils Absolute 0.03 Thousands/µL                    XR chest 1 view portable   Final Result by Rico Aquino MD (04/25 0934)      There is emphysema. There is persistent right basilar atelectasis or scarring and mild asymmetric prominence of the lung markings on the right versus the left which seems stable. No significant new abnormality appreciated            Workstation performed: [id number]                    Procedures  ECG 12 Lead Documentation Only    Date/Time: 4/25/2024 6:32 AM    Performed by: Jacob Mijares DO  Authorized by: Jacob Mijares DO    ECG reviewed by me, the ED Provider: yes    Patient location:  ED  Rate:     ECG rate:  88    ECG rate assessment: normal    Ectopy:     Ectopy: none    QRS:     QRS axis:  Normal  Conduction:     Conduction: normal    ST segments:     ST segments:  Normal  T waves:     T waves: non-specific             ED Course                               SBIRT 22yo+      Flowsheet Row Most Recent Value   Initial Alcohol Screen: US AUDIT-C     1. How often do  you have a drink containing alcohol? 0 Filed at: 04/25/2024 0703   2. How many drinks containing alcohol do you have on a typical day you are drinking?  0 Filed at: 04/25/2024 0703   3a. Male UNDER 65: How often do you have five or more drinks on one occasion? 0 Filed at: 04/25/2024 0703   3b. FEMALE Any Age, or MALE 65+: How often do you have 4 or more drinks on one occassion? 0 Filed at: 04/25/2024 0703   Audit-C Score 0 Filed at: 04/25/2024 0703   ALAN: How many times in the past year have you...    Used an illegal drug or used a prescription medication for non-medical reasons? Never Filed at: 04/25/2024 0703                      Medical Decision Making  88-year-old gentleman presents with recurrent hypoxia.  It is unclear whether or not the patient's oxygen concentrator has been working properly.  Patient and family are not admitting to infectious symptoms at this time.  Will check basic labs and continue to monitor.  Oxygen saturations have improved with supplemental oxygen in the ER.  Will sign out to the day team to follow-up results and dispo accordingly.    Amount and/or Complexity of Data Reviewed  Labs: ordered.  Radiology: ordered.    Risk  Prescription drug management.  Decision regarding hospitalization.             Disposition  Final diagnoses:   COPD (chronic obstructive pulmonary disease) (HCC)   Hypoxia   Hypertension     Time reflects when diagnosis was documented in both MDM as applicable and the Disposition within this note       Time User Action Codes Description Comment    4/25/2024  6:53 AM Jacob Mijares Add [J44.9] COPD (chronic obstructive pulmonary disease) (HCC)     4/25/2024  6:53 AM Jacob Mijares Add [R09.02] Hypoxia     4/25/2024  6:53 AM Jacob Mijares Add [I10] Hypertension           ED Disposition       ED Disposition   Admit    Condition   Stable    Date/Time   Thu Apr 25, 2024 1027    Comment   Case was discussed with FM and the patient's admission status was agreed to be Admission  Status: inpatient status to the service of Dr. Henry .               Follow-up Information    None         Current Discharge Medication List        CONTINUE these medications which have NOT CHANGED    Details   acetaminophen (TYLENOL) 650 mg CR tablet Take 1 tablet (650 mg total) by mouth every 8 (eight) hours as needed for mild pain  Qty: 90 tablet, Refills: 5    Associated Diagnoses: Chronic bilateral low back pain without sciatica      brimonidine tartrate 0.2 % ophthalmic solution INSTILL 1 DROP IN LEFT EYE TWICE DAILY INSTILL 1 DROP IN LEFT EYE DOS VECES AL EULALIO      budesonide (Pulmicort) 0.5 mg/2 mL nebulizer solution Take 2 mL (0.5 mg total) by nebulization 2 (two) times a day Rinse mouth after use.  Qty: 360 mL, Refills: 3    Associated Diagnoses: Chronic obstructive pulmonary disease, unspecified COPD type (HCC)      fluticasone-umeclidinium-vilanterol (Trelegy Ellipta) 200-62.5-25 mcg/actuation AEPB inhaler INHALE 1 PUFF DAILY RINSE MOUTH AFTER USE.  Qty: 60 each, Refills: 11    Associated Diagnoses: Chronic obstructive pulmonary disease, unspecified COPD type (HCC)      formoterol (PERFOROMIST) 20 MCG/2ML nebulizer solution Take 2 mL (20 mcg total) by nebulization 2 (two) times a day  Qty: 120 mL, Refills: 30    Associated Diagnoses: Chronic obstructive pulmonary disease, unspecified COPD type (HCC)      ipratropium-albuterol (DUO-NEB) 0.5-2.5 mg/3 mL nebulizer solution Take 3 mL by nebulization 2 (two) times a day  Qty: 540 mL, Refills: 3    Associated Diagnoses: Chronic obstructive pulmonary disease, unspecified COPD type (HCC)      losartan (COZAAR) 25 mg tablet TAKE ONE TABLET BY MOUTH TWICE DAILYTOMAR 1 TABLETA POR VIA ORAL DOS VECES AL EULALIO  Qty: 180 tablet, Refills: 0    Associated Diagnoses: Benign essential hypertension      mometasone (ELOCON) 0.1 % lotion Apply topically daily  Qty: 60 mL, Refills: 5    Associated Diagnoses: Seborrhea      pantoprazole (PROTONIX) 40 mg tablet Take 1  tablet (40 mg total) by mouth daily  Qty: 30 tablet, Refills: 5    Associated Diagnoses: Long term (current) use of systemic steroids      predniSONE 5 mg tablet Take 1 tablet (5 mg total) by mouth daily  Qty: 30 tablet, Refills: 2    Associated Diagnoses: Chronic respiratory failure with hypoxia (HCC)      senna-docusate sodium (SENOKOT S) 8.6-50 mg per tablet Take 1 tablet by mouth daily  Qty: 30 tablet, Refills: 5    Associated Diagnoses: Chronic idiopathic constipation             No discharge procedures on file.    PDMP Review         Value Time User    PDMP Reviewed  Yes 1/4/2024  9:05 AM Jennifer Paige Bloch, CRNP            ED Provider  Electronically Signed by             Jacob Mijares DO  04/25/24 4719

## 2024-04-25 NOTE — ASSESSMENT & PLAN NOTE
O2 at home 2-3 L   Currently on nasal canula FiO2 30 %. 4 L   Chronic CO2 retainer but Bicarb and CO2 continue to improve.     Plan:  - Continue O2 supplementation with nasal canula.   - Use O2 tanks until Sherylbrayden is able to replace batteries in portable machine   - CPAP at bedtime.   - Plan per COPD management.

## 2024-04-25 NOTE — ASSESSMENT & PLAN NOTE
BP Readings from Last 3 Encounters:   04/27/24 160/84   03/25/24 140/80   02/21/24 (!) 174/89    Above goal.   BP medication at home: losartan 25 mg.     Plan:  - continue losartan 25 mg daily

## 2024-04-25 NOTE — ASSESSMENT & PLAN NOTE
Patient has difficulty hearing at baseline. Pt noncompliant with use of hearing aids.  Conductive and sensoneural hearing loss, last ENT visit 3/18/24.    - Continue use of hearing aids  - f/u with ENT for possible myringotomy

## 2024-04-25 NOTE — ASSESSMENT & PLAN NOTE
"Is able to be redirected by wife at bedside.   Differential diagnosis include: hypercapnia vs hypoxic injury  Likely underlying dementia per CT head (12/5/23): \"....right frontal lobe encephalomalacia\"     Plan:  - O2 supplementation and CPAP at night to prevent hypercapnic episodes.    "

## 2024-04-26 LAB
ANION GAP SERPL CALCULATED.3IONS-SCNC: 6 MMOL/L (ref 4–13)
BUN SERPL-MCNC: 29 MG/DL (ref 5–25)
CALCIUM SERPL-MCNC: 9.6 MG/DL (ref 8.4–10.2)
CHLORIDE SERPL-SCNC: 95 MMOL/L (ref 96–108)
CO2 SERPL-SCNC: 44 MMOL/L (ref 21–32)
CREAT SERPL-MCNC: 0.93 MG/DL (ref 0.6–1.3)
GFR SERPL CREATININE-BSD FRML MDRD: 74 ML/MIN/1.73SQ M
GLUCOSE SERPL-MCNC: 90 MG/DL (ref 65–140)
POTASSIUM SERPL-SCNC: 4 MMOL/L (ref 3.5–5.3)
SODIUM SERPL-SCNC: 145 MMOL/L (ref 135–147)
T4 FREE SERPL-MCNC: 0.68 NG/DL (ref 0.61–1.12)

## 2024-04-26 PROCEDURE — 80048 BASIC METABOLIC PNL TOTAL CA: CPT

## 2024-04-26 PROCEDURE — 97163 PT EVAL HIGH COMPLEX 45 MIN: CPT

## 2024-04-26 PROCEDURE — 97167 OT EVAL HIGH COMPLEX 60 MIN: CPT

## 2024-04-26 PROCEDURE — 94640 AIRWAY INHALATION TREATMENT: CPT

## 2024-04-26 PROCEDURE — 99232 SBSQ HOSP IP/OBS MODERATE 35: CPT | Performed by: FAMILY MEDICINE

## 2024-04-26 PROCEDURE — 94760 N-INVAS EAR/PLS OXIMETRY 1: CPT

## 2024-04-26 RX ORDER — QUETIAPINE FUMARATE 25 MG/1
12.5 TABLET, FILM COATED ORAL 2 TIMES DAILY PRN
Status: DISCONTINUED | OUTPATIENT
Start: 2024-04-27 | End: 2024-04-27 | Stop reason: HOSPADM

## 2024-04-26 RX ORDER — AZITHROMYCIN 250 MG/1
250 TABLET, FILM COATED ORAL EVERY 24 HOURS
Status: DISCONTINUED | OUTPATIENT
Start: 2024-04-27 | End: 2024-04-27 | Stop reason: HOSPADM

## 2024-04-26 RX ORDER — HALOPERIDOL 5 MG/ML
2 INJECTION INTRAMUSCULAR ONCE
Status: COMPLETED | OUTPATIENT
Start: 2024-04-26 | End: 2024-04-26

## 2024-04-26 RX ORDER — QUETIAPINE FUMARATE 25 MG/1
25 TABLET, FILM COATED ORAL ONCE
Status: COMPLETED | OUTPATIENT
Start: 2024-04-26 | End: 2024-04-26

## 2024-04-26 RX ORDER — AMOXICILLIN 250 MG
1 CAPSULE ORAL ONCE
Status: COMPLETED | OUTPATIENT
Start: 2024-04-26 | End: 2024-04-26

## 2024-04-26 RX ADMIN — HALOPERIDOL LACTATE 2 MG: 5 INJECTION, SOLUTION INTRAMUSCULAR at 01:41

## 2024-04-26 RX ADMIN — FORMOTEROL FUMARATE DIHYDRATE 20 MCG: 20 SOLUTION RESPIRATORY (INHALATION) at 19:16

## 2024-04-26 RX ADMIN — LOSARTAN POTASSIUM 25 MG: 25 TABLET, FILM COATED ORAL at 09:34

## 2024-04-26 RX ADMIN — PANTOPRAZOLE SODIUM 40 MG: 40 TABLET, DELAYED RELEASE ORAL at 06:17

## 2024-04-26 RX ADMIN — MELATONIN TAB 3 MG 3 MG: 3 TAB at 21:03

## 2024-04-26 RX ADMIN — ENOXAPARIN SODIUM 40 MG: 40 INJECTION SUBCUTANEOUS at 09:34

## 2024-04-26 RX ADMIN — BUDESONIDE 0.5 MG: 0.5 INHALANT RESPIRATORY (INHALATION) at 07:30

## 2024-04-26 RX ADMIN — IPRATROPIUM BROMIDE AND ALBUTEROL SULFATE 3 ML: 2.5; .5 SOLUTION RESPIRATORY (INHALATION) at 13:07

## 2024-04-26 RX ADMIN — BUDESONIDE 0.5 MG: 0.5 INHALANT RESPIRATORY (INHALATION) at 19:16

## 2024-04-26 RX ADMIN — QUETIAPINE FUMARATE 25 MG: 25 TABLET ORAL at 21:03

## 2024-04-26 RX ADMIN — BRIMONIDINE TARTRATE 1 DROP: 2 SOLUTION OPHTHALMIC at 09:34

## 2024-04-26 RX ADMIN — IPRATROPIUM BROMIDE AND ALBUTEROL SULFATE 3 ML: 2.5; .5 SOLUTION RESPIRATORY (INHALATION) at 07:30

## 2024-04-26 RX ADMIN — IPRATROPIUM BROMIDE AND ALBUTEROL SULFATE 3 ML: 2.5; .5 SOLUTION RESPIRATORY (INHALATION) at 19:16

## 2024-04-26 RX ADMIN — BRIMONIDINE TARTRATE 1 DROP: 2 SOLUTION OPHTHALMIC at 17:50

## 2024-04-26 RX ADMIN — FORMOTEROL FUMARATE DIHYDRATE 20 MCG: 20 SOLUTION RESPIRATORY (INHALATION) at 07:30

## 2024-04-26 RX ADMIN — AZITHROMYCIN DIHYDRATE 250 MG: 250 TABLET ORAL at 09:34

## 2024-04-26 RX ADMIN — UMECLIDINIUM 1 PUFF: 62.5 AEROSOL, POWDER ORAL at 09:32

## 2024-04-26 RX ADMIN — SENNOSIDES AND DOCUSATE SODIUM 1 TABLET: 8.6; 5 TABLET ORAL at 15:09

## 2024-04-26 RX ADMIN — PREDNISONE 40 MG: 20 TABLET ORAL at 09:34

## 2024-04-26 NOTE — PROGRESS NOTES
Progress Note    Severiano Feliciano 88 y.o. male MRN: [id number]  Unit/Bed#: 7T [facility] 703-01 Encounter: [id number]  Admitting Physician: Rebekah Garcia MD  PCP: Kirby Casanova MD  Date of Admission:  4/25/2024  6:18 AM    Assessment and Plan    * Acute on chronic respiratory failure with hypoxia and hypercapnia (HCC)  Assessment & Plan  O2 at home 2-3 L   Currently on nasal canula FiO2 30 %. 3 L   S/p high flow in the ED  ABG: ph 7.357 , CO2 75.1 - elevated CO2 is a chronic issue.   Secondary to COPD exacerbation, non adherence to CPAP at night.   Bicarb improving >45 ..44    Plan:  - Continue O2 supplementation with nasal canula- currently 5 L   - Consider BiPAP for short period of time- with caution due to hx of vomiting and aspiration while on BiPAP.   - COPD management.        Delirium  Assessment & Plan  While in the ED - very difficult to redirect by wife at bedside.   Differential diagnosis include: hypercapnia vs hypoxic injury vs infection   S/p Seroquel 25 mg in the ED.  Haldol 2 mg last night due to increased agitation and refusing seroquel      Plan:  - Delirium precautions  - Seroquel PRN 12.5 mg BID   - O2 supplementation   - Fall precautions.     COPD exacerbation (HCC)  Assessment & Plan  O2 supplementation at 2 L at baseline.- non always adherent.   CPAP at night - non adherent    Past 3 days increased cough and sputum production   Flu/Covid/ RSV swab negative.   S/P solumedrol 125 mg IV in the ED.     Plan:  - Azithromycin 250 mg daily day 1/4   - Prednisone 40 mg daily day 1/4   - RT protocol  - Continue home med: Duoneb TID, Albuterol as needed, budesonide BID.    Benign essential hypertension  Assessment & Plan  BP Readings from Last 3 Encounters:   04/26/24 (!) 177/91   03/25/24 140/80   02/21/24 (!) 174/89    Above goal.   BP medication at home: losartan 25 mg.   Likely due to distress and agitation    Plan:  - continue losartan 25 mg daily, - consider increasing dose if BP not controlled.      Bilateral hearing loss  Assessment & Plan  Patient has difficulty hearing during encounter   Conductive and sensoneural hearing loss, last ENT visit 3/18/24         VTE Pharmacologic Prophylaxis: VTE Score: 7 High Risk (Score >/= 5) - Pharmacological DVT Prophylaxis Ordered: enoxaparin (Lovenox). Sequential Compression Devices Ordered.    Patient Centered Rounds: I have performed bedside rounds with nursing staff today.    Discussions with Specialists or Other Care Team Provider: n/a    Education and Discussions with Family / Patient: wife   Patient Information Sharing: With the consent of Severiano Feliciano , their loved ones (wife Sandy ) were notified today by inpatient team of the patient’s condition and current plan.  All questions answered.     Time Spent for Care: 30 minutes.  More than 50% of total time spent on counseling and coordination of care as described above.    Current Length of Stay: 1 day(s)    Current Patient Status: Inpatient   Certification Statement: The patient will continue to require additional inpatient hospital stay due to acute on chronic respiratory failure requiring O2 supplementation     Discharge Plan: home once medically optimized     Code Status: Level 1 - Full Code      Subjective:   Patient seen and examined at bedside, wife at bedside at all times. Overnight patient desaturated to mid 80s while sleeping and was switched to high flow nasal cannula with rapid improvement which was later on titrated down and now he is saturating well on 5 L low flow nasal canula. Around 2 am patient became increasingly agitated despite attempts to redirect, he refused seroquel PO and haldol IM was given.     Objective:     Vitals:   Temp (24hrs), Av.9 °F (36.6 °C), Min:97.3 °F (36.3 °C), Max:98.4 °F (36.9 °C)    Temp:  [97.3 °F (36.3 °C)-98.4 °F (36.9 °C)] 97.3 °F (36.3 °C)  HR:  [70-85] 70  Resp:  [20] 20  BP: (149-177)/() 177/91  SpO2:  [84 %-100 %] 93 %  Body mass index is 23.48  kg/m².     Input and Output Summary (last 24 hours):       Intake/Output Summary (Last 24 hours) at 4/26/2024 1435  Last data filed at 4/26/2024 0940  Gross per 24 hour   Intake 230 ml   Output 400 ml   Net -170 ml       Physical Exam:     Physical Exam  Constitutional:       Appearance: Normal appearance.      Comments: Patient sleeping during exam    HENT:      Head: Normocephalic.      Right Ear: External ear normal.      Left Ear: External ear normal.      Nose: Nose normal. No congestion or rhinorrhea.      Mouth/Throat:      Mouth: Mucous membranes are moist.      Pharynx: No posterior oropharyngeal erythema.   Eyes:      General: No scleral icterus.     Extraocular Movements: Extraocular movements intact.      Conjunctiva/sclera: Conjunctivae normal.   Cardiovascular:      Rate and Rhythm: Normal rate and regular rhythm.      Pulses: Normal pulses.      Heart sounds: Normal heart sounds. No murmur heard.  Pulmonary:      Effort: Pulmonary effort is normal. No respiratory distress.      Breath sounds: Normal breath sounds. No wheezing or rales.      Comments: On O2 nasal canula   Abdominal:      General: Abdomen is flat. Bowel sounds are normal. There is no distension.      Palpations: Abdomen is soft.   Musculoskeletal:         General: Normal range of motion.      Cervical back: Normal range of motion and neck supple. No rigidity.      Right lower leg: No edema.      Left lower leg: No edema.   Lymphadenopathy:      Cervical: No cervical adenopathy.   Skin:     General: Skin is warm.      Coloration: Skin is not jaundiced or pale.   Neurological:      Mental Status: He is alert.   Psychiatric:         Mood and Affect: Mood normal.       Additional Data:     Labs:    Results from last 7 days   Lab Units 04/25/24  0646   WBC Thousand/uL 7.18   HEMOGLOBIN g/dL 11.6*   HEMATOCRIT % 38.3   PLATELETS Thousands/uL 117*   SEGS PCT % 72   LYMPHO PCT % 14   MONO PCT % 7   EOS PCT % 7*     Results from last 7 days    Lab Units 04/26/24  0441 04/25/24  0646   POTASSIUM mmol/L 4.0 4.3   CHLORIDE mmol/L 95* 96   CO2 mmol/L 44* >45*   BUN mg/dL 29* 23   CREATININE mg/dL 0.93 0.90   CALCIUM mg/dL 9.6 9.8   ALK PHOS U/L  --  61   ALT U/L  --  10   AST U/L  --  16     Results from last 7 days   Lab Units 04/25/24  0646   INR  0.95     * I Have Reviewed All Lab Data Listed Above.  * Additional Pertinent Lab Tests Reviewed: All Labs Within Last 24 Hours Reviewed    Imaging:    Imaging Reports Reviewed Today Include: none    Recent Cultures (last 7 days):     Results from last 7 days   Lab Units 04/25/24  0646   BLOOD CULTURE  No Growth at 24 hrs.  No Growth at 24 hrs.       Last 24 Hours Medication List:   Current Facility-Administered Medications   Medication Dose Route Frequency Provider Last Rate    acetaminophen  325 mg Oral Q6H PRN Rebekah Garcia MD      albuterol  2.5 mg Nebulization Q4H PRN Buster Neville MD      [START ON 4/27/2024] azithromycin  250 mg Oral Q24H Patricia Kam MD      brimonidine tartrate  1 drop Both Eyes BID Rebekah Garcia MD      budesonide  0.5 mg Nebulization BID Moisés Henry MD      enoxaparin  40 mg Subcutaneous Daily Rebekah Garcia MD      formoterol  20 mcg Nebulization BID Moisés Henry MD      ipratropium-albuterol  3 mL Nebulization TID Buster Neville MD      losartan  25 mg Oral Daily Rebekah Garcia MD      melatonin  3 mg Oral HS Rebekah Garcia MD      pantoprazole  40 mg Oral Daily Rebekah Garcia MD      predniSONE  40 mg Oral Daily Rebekah Garcia MD      QUEtiapine  12.5 mg Oral BID PRN Patricia Kam MD      senna-docusate sodium  1 tablet Oral Once Rebekah Garcia MD      umeclidinium  1 puff Inhalation Daily MD Rebekah Herrera MD  04/26/24  2:35 PM

## 2024-04-26 NOTE — NURSING NOTE
Pt self removed high flow nasal cannula and refused to put it back on.  Pts wife tried to persuade pt to wear the high flow oxygen but still refused to comply.  Managed to place 4L nasal cannula

## 2024-04-26 NOTE — PLAN OF CARE
Problem: PHYSICAL THERAPY ADULT  Goal: Performs mobility at highest level of function for planned discharge setting.  See evaluation for individualized goals.  Description: Treatment/Interventions: ADL retraining, Functional transfer training, LE strengthening/ROM, Elevations, Therapeutic exercise, Endurance training, Patient/family training, Equipment eval/education, Bed mobility, Gait training, Spoke to nursing, Spoke to case management, OT  Equipment Recommended: Walker       See flowsheet documentation for full assessment, interventions and recommendations.  Outcome: Progressing  Note: Prognosis: Good  Problem List: Decreased strength, Decreased endurance, Impaired balance, Decreased mobility, Decreased coordination, Impaired sensation     Barriers to Discharge: Inaccessible home environment     Rehab Resource Intensity Level, PT: III (Minimum Resource Intensity)    See flowsheet documentation for full assessment.

## 2024-04-26 NOTE — PLAN OF CARE
Problem: OCCUPATIONAL THERAPY ADULT  Goal: Performs self-care activities at highest level of function for planned discharge setting.  See evaluation for individualized goals.  Description: Treatment Interventions: ADL retraining, UE strengthening/ROM, Endurance training, Continued evaluation, Activityengagement          See flowsheet documentation for full assessment, interventions and recommendations.   Outcome: Progressing  Note: Limitation: Decreased high-level ADLs, Decreased endurance     Assessment: Pt is a 88 y.o. male seen for OT evaluation s/p admit to [facility] on 4/25/2024 w/ Acute on chronic respiratory failure with hypoxia and hypercapnia (HCC).  See medical history above for extensive list of comorbidities affecting pt's functional performance at time of assessment. Personal factors affecting Pt at time of IE include:health management . Upon evaluation: Pt requires supervision for functional ambulation including bathroom mobility and negotiation of small spaces, supervision for ADL transfers.  Pt requires supervision for ADLs in standing. Pt's primary barrier(s) at this time: decreased endurance, noted SOB with prolonged standing activity. Pt required seated rest break following standing grooming tasks at the sink and toileting. The following deficits impact occupational performance: weakness, decreased strength, decreased balance, and decreased tolerance. Pt to benefit from continued skilled OT services while in the hospital to address deficits as defined above and maximize level of functional independence w ADL's and functional mobility. Occupational performance areas to address include: bathing/shower, toilet hygiene, dressing, health maintenance, functional mobility, and clothing management. From OT standpoint, recommendation at time of d/c would be minimum resource intensity.       Rehab Resource Intensity Level, OT: III (Minimum Resource Intensity)

## 2024-04-26 NOTE — PLAN OF CARE
Problem: DISCHARGE PLANNING  Goal: Discharge to home or other facility with appropriate resources  Description: INTERVENTIONS:  - Identify barriers to discharge w/patient and caregiver  - Arrange for needed discharge resources and transportation as appropriate  - Identify discharge learning needs (meds, wound care, etc.)  - Arrange for interpretive services to assist at discharge as needed  - Refer to Case Management Department for coordinating discharge planning if the patient needs post-hospital services based on physician/advanced practitioner order or complex needs related to functional status, cognitive ability, or social support system  Outcome: Progressing     Problem: RESPIRATORY - ADULT  Goal: Achieves optimal ventilation and oxygenation  Description: INTERVENTIONS:  - Assess for changes in respiratory status  - Assess for changes in mentation and behavior  - Position to facilitate oxygenation and minimize respiratory effort  - Oxygen administered by appropriate delivery if ordered  - Initiate smoking cessation education as indicated  - Encourage broncho-pulmonary hygiene including cough, deep breathe, Incentive Spirometry  - Assess the need for suctioning and aspirate as needed  - Assess and instruct to report SOB or any respiratory difficulty  - Respiratory Therapy support as indicated  Outcome: Progressing     Problem: Knowledge Deficit  Goal: Patient/family/caregiver demonstrates understanding of disease process, treatment plan, medications, and discharge instructions  Description: Complete learning assessment and assess knowledge base.  Interventions:  - Provide teaching at level of understanding  - Provide teaching via preferred learning methods  Outcome: Progressing

## 2024-04-26 NOTE — UTILIZATION REVIEW
"Initial Clinical Review    Admission: Date/Time/Statement:   Admission Orders (From admission, onward)       Ordered        04/25/24 1022  Inpatient Admission  Once                          Orders Placed This Encounter   Procedures    Inpatient Admission     Standing Status:   Standing     Number of Occurrences:   1     Order Specific Question:   Level of Care     Answer:   Med Surg [16]     Order Specific Question:   Estimated length of stay     Answer:   More than 2 Midnights     Order Specific Question:   Certification     Answer:   I certify that inpatient services are medically necessary for this patient for a duration of greater than two midnights. See H&P and MD Progress Notes for additional information about the patient's course of treatment.     ED Arrival Information       Expected   -    Arrival   4/25/2024 06:18    Acuity   Urgent              Means of arrival   Ambulance    Escorted by   [facility] ([facility])    Service   Family Medicine    Admission type   Emergency              Arrival complaint   change of mental status             Chief Complaint   Patient presents with    Decreased Oxygen Level     Pt's family called [facility] after witnessing pt's O2 de-satting to the 60s.  [facility] placed pt on 6L O2 en route via nasal cannula. Pt is on home oxygen, wife states 2L O2 @ home. Pt is not verbal during triage. [facility] stated \"family says the pt gets confused when experiencing low O2\"       Initial Presentation: 88 y.o. male with hx of COPD, former smoker, HTN, bilateral hearing loss who presents with confusion. Patient woke up this morning confused, patient was complaining for his mother who is passed away many years ago, walking around in an agitated state, wife checked his blood pressure with that time was 88 he was also not wearing oxygen saturation at that time was low (his wife is unable to provide a number). Patient at baseline uses 2 to 3 L of oxygen nasal cannula he was instructed to also use " "\" a mask\" nighttime adherent. Patient is sometimes forgetful however this is not his baseline per wife history. Plan: Inpatient admission for evaluation and treatment of acute on chronic resp failure, delirium, COPD exacerbation, HTN: O2 supplementation, delirium precautions, fall precautions, dysphagia det, azithromycin, prednisone, continuehome DuoNeb tid, budesonide bid, continue losartan.     Anticipated Length of Stay/Certification Statement: Patient will be admitted on an Inpatient basis with an anticipated length of stay of  more than 2 midnights. Justification for Hospital Stay: COPD exacerbation     Date: 4/26   Day 2:     Internal medicine: Continue O2 supplementation with nasal canula- currently 3 L. Delirium precautions. Fall precautions. Continue azithromycin and prednisone. Continue losartan.     Date: 4/27  Day 3: Has surpassed a 2nd midnight with active treatments and services. Delirium precautions. Fall precautions. O2 supplementation. Continue azithromycin and prednisone. Duoneb TID, Albuterol as needed, budesonide BID. Continue O2 supplementation with nasal canula- currently 3 L        ED Triage Vitals   Temperature Pulse Respirations Blood Pressure SpO2   04/25/24 0622 04/25/24 0622 04/25/24 0622 04/25/24 0622 04/25/24 0622   98.8 °F (37.1 °C) 89 (!) 27 (!) 211/102 (!) 89 %      Temp Source Heart Rate Source Patient Position - Orthostatic VS BP Location FiO2 (%)   04/25/24 0622 04/25/24 0622 04/25/24 0622 04/25/24 0622 04/25/24 2115   Oral Monitor Lying Left arm 50      Pain Score       04/25/24 0800       No Pain          Wt Readings from Last 1 Encounters:   04/25/24 60.1 kg (132 lb 9 oz)     Additional Vital Signs:     Date/Time Temp Pulse Resp BP MAP (mmHg) SpO2 FiO2 (%) Calculated FIO2 (%) - Nasal Cannula O2 Flow Rate (L/min) Nasal Cannula O2 Flow Rate (L/min) O2 Device   04/26/24 0900 -- -- -- -- -- 93 % -- 28 -- 2 L/min Nasal cannula   04/26/24 0703 97.3 °F (36.3 °C) Abnormal  70 20 177/91 " Abnormal  109 100 % -- 36 -- 4 L/min Nasal cannula   04/26/24 0559 -- -- -- -- -- 96 % -- 40 -- 5 L/min Nasal cannula   04/26/24 0516 -- -- -- -- -- 96 % -- 52 -- 8 L/min High flow nasal cannula   04/26/24 0508 -- -- -- -- -- 92 % -- 52 -- 8 L/min Mid flow nasal cannula   04/26/24 0500 -- -- -- -- -- 92 % -- 48 -- 7 L/min Mid flow nasal cannula   04/26/24 0400 -- -- -- -- -- 92 % -- 48 -- 7 L/min Mid flow nasal cannula   04/26/24 0304 -- -- -- -- -- 93 % -- 44 -- 6 L/min Mid flow nasal cannula   04/26/24 0201 -- -- -- -- -- 93 % -- 44 -- 6 L/min Mid flow nasal cannula   04/26/24 0150 -- -- -- -- -- 94 % -- 44 -- 6 L/min Nasal cannula   04/26/24 0149 -- -- -- -- -- 89 % Abnormal  -- 44 -- 6 L/min Nasal cannula   04/26/24 0130 -- -- -- -- -- 84 % Abnormal  -- 36 -- 4 L/min Nasal cannula   04/26/24 0111 -- -- -- -- -- 88 % Abnormal  -- 36 -- 4 L/min Nasal cannula   04/26/24 0100 -- -- -- -- -- 91 % -- 32 -- 3 L/min Nasal cannula   04/26/24 0050 -- -- -- -- -- 85 % Abnormal  -- 32 -- 3 L/min Nasal cannula   04/26/24 0029 -- -- -- -- -- 92 % 60 -- -- -- High flow nasal cannula   04/25/24 2346 98 °F (36.7 °C) 85 20 149/79 89 94 % -- 32 -- 3 L/min High flow nasal cannula   04/25/24 2155 -- -- -- -- -- 93 % 50 -- -- -- High flow nasal cannula   04/25/24 2148 -- -- -- -- -- 91 % 50 -- -- -- High flow nasal cannula   04/25/24 2130 -- -- -- -- -- 84 % Abnormal  -- 32 -- 3 L/min Nasal cannula   04/25/24 2115 -- -- -- -- -- 90 % 50 -- 25 L/min -- High flow nasal cannula   04/25/24 1936 -- -- -- -- -- 93 % -- 32 -- 3 L/min Nasal cannula   04/25/24 1502 98.4 °F (36.9 °C) 78 20 164/100 118 92 % -- 32 -- 3 L/min Nasal cannula   04/25/24 1234 -- -- -- -- -- 91 % -- -- -- -- --   04/25/24 1154 97.8 °F (36.6 °C) 88 20 181/97 Abnormal  113 94 % -- 30 -- 2.5 L/min Nasal cannula   04/25/24 1141 -- 80 -- 133/80 -- 97 % -- -- 3 L/min -- Nasal cannula   04/25/24 1030 -- 83 20 186/104 Abnormal  -- 88 % Abnormal  -- -- -- -- High flow nasal  cannula   04/25/24 0930 -- 79 20 185/104 Abnormal  -- 96 % -- -- -- -- High flow nasal cannula   04/25/24 0838 -- -- -- -- -- 96 % -- -- -- -- --   04/25/24 0800 -- 85 22 183/109 Abnormal  -- 97 % -- -- -- -- Nasal cannula   04/25/24 0656 -- -- -- -- -- -- -- -- -- -- Nasal cannula    O2 Device: presently 3.5liters at 04/25/24 0656   04/25/24 0649 -- 82 28 Abnormal  181/107 Abnormal  -- 94 % -- 34 -- 3.5 L/min Nasal cannula     Pertinent Labs/Diagnostic Test Results:   XR chest 1 view portable   Final Result by Rico Aquino MD (04/25 0934)      There is emphysema. There is persistent right basilar atelectasis or scarring and mild asymmetric prominence of the lung markings on the right versus the left which seems stable. No significant new abnormality appreciated            Workstation performed: [id number]           4/25 EKG:  Sinus rhythm with Premature supraventricular complexes  Poor R wave progression  Borderline ECG  When compared with ECG of 21-FEB-2024 19:01,  Premature supraventricular complexes are now Present    Results from last 7 days   Lab Units 04/25/24  0646   SARS-COV-2  Negative     Results from last 7 days   Lab Units 04/25/24  0646   WBC Thousand/uL 7.18   HEMOGLOBIN g/dL 11.6*   HEMATOCRIT % 38.3   PLATELETS Thousands/uL 117*   TOTAL NEUT ABS Thousands/µL 5.05         Results from last 7 days   Lab Units 04/26/24  0441 04/25/24  0646   SODIUM mmol/L 145 146   POTASSIUM mmol/L 4.0 4.3   CHLORIDE mmol/L 95* 96   CO2 mmol/L 44* >45*   ANION GAP mmol/L 6  --    BUN mg/dL 29* 23   CREATININE mg/dL 0.93 0.90   EGFR ml/min/1.73sq m 74 77   CALCIUM mg/dL 9.6 9.8     Results from last 7 days   Lab Units 04/25/24  0646   AST U/L 16   ALT U/L 10   ALK PHOS U/L 61   TOTAL PROTEIN g/dL 6.9   ALBUMIN g/dL 3.8   TOTAL BILIRUBIN mg/dL 0.34         Results from last 7 days   Lab Units 04/26/24  0441 04/25/24  0646   GLUCOSE RANDOM mg/dL 90 125       Results from last 7 days   Lab Units 04/25/24  1356  04/25/24  0732   PH ART  7.354 7.357   PCO2 ART mm Hg 73.5* 75.1*   PO2 ART mm Hg 73.6* 76.7   HCO3 ART mmol/L 40.0* 41.2*   BASE EXC ART mmol/L 11.2 12.6   O2 CONTENT ART mL/dL 18.1 16.6   O2 HGB, ARTERIAL % 92.6* 93.4*   ABG SOURCE  Radial, Left Radial, Left           Results from last 7 days   Lab Units 04/25/24  0913 04/25/24  0646   HS TNI 0HR ng/L  --  25   HS TNI 2HR ng/L 28  --    HSTNI D2 ng/L 3  --          Results from last 7 days   Lab Units 04/25/24  0646   PROTIME seconds 13.0   INR  0.95   PTT seconds 28     Results from last 7 days   Lab Units 04/25/24  0646   TSH 3RD GENERATON uIU/mL 0.258*           Results from last 7 days   Lab Units 04/25/24  0646   BNP pg/mL 154*           Results from last 7 days   Lab Units 04/25/24  0836   CLARITY UA  Clear   COLOR UA  Straw   SPEC GRAV UA  1.010   PH UA  8.0   GLUCOSE UA mg/dl Negative   KETONES UA mg/dl Negative   BLOOD UA  Negative   PROTEIN UA mg/dl Negative   NITRITE UA  Negative   BILIRUBIN UA  Negative   UROBILINOGEN UA mg/dL Negative   LEUKOCYTES UA  Negative     Results from last 7 days   Lab Units 04/25/24  0646   INFLUENZA A PCR  Negative   INFLUENZA B PCR  Negative   RSV PCR  Negative           Results from last 7 days   Lab Units 04/25/24  0646   BLOOD CULTURE  Received in Microbiology Lab. Culture in Progress.  Received in Microbiology Lab. Culture in Progress.         ED Treatment:   Medication Administration from 04/25/2024 0617 to 04/25/2024 1152         Date/Time Order Dose Route Action     04/25/2024 0708 EDT losartan (COZAAR) tablet 25 mg 25 mg Oral Given     04/25/2024 0837 EDT albuterol inhalation solution 10 mg 10 mg Nebulization Given     04/25/2024 0837 EDT ipratropium (ATROVENT) 0.02 % inhalation solution 1 mg 1 mg Nebulization Given     04/25/2024 0838 EDT sodium chloride 0.9 % inhalation solution 12 mL 12 mL Nebulization Given     04/25/2024 0801 EDT methylPREDNISolone sodium succinate (Solu-MEDROL) injection 125 mg 125 mg  Intravenous Given     04/25/2024 1033 EDT QUEtiapine (SEROquel) tablet 25 mg 25 mg Oral Given          Past Medical History:   Diagnosis Date    Acute metabolic encephalopathy 06/13/2020    Age-related cataract of right eye 04/04/2023    patient is medically cleared and presents with low risk of complication with this upcoming R cataract surgery.    Anemia     Aneurysm, aorta, thoracic (HCC)     Appendicolith     Ascending aortic aneurysm (HCC)     3.7    Asthma     BPH (benign prostatic hyperplasia)     CAD (coronary artery disease)     noted on CT scan    CHF (congestive heart failure) (HCC)     COPD (chronic obstructive pulmonary disease) (HCC)     Descending thoracic aortic aneurysm (HCC)     Diabetes mellitus (HCC)     Diverticulosis     Former tobacco use     GERD (gastroesophageal reflux disease)     History of DVT (deep vein thrombosis)     Left leg    History of transfusion     Hypertension     Hypoxemia 04/30/2023    Inguinal hernia     right    Nephrolithiasis     Oxygen dependent     2LNC    Oxygen dependent     Pneumonia     Pre-diabetes     Prostate calculus     PVD (peripheral vascular disease) ([facility])     Recurrent right inguinal hernia w incarcertion 01/04/2023    Thoracic aortic aneurysm without rupture ([facility]) 11/19/2018    Added automatically from request for surgery [id number]    Thrombocytopenia ([facility]) 12/29/2018    Ulcer     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 male  Admission Orders:  Scheduled Medications:  [START ON 4/27/2024] azithromycin, 250 mg, Oral, Q24H  brimonidine tartrate, 1 drop, Both Eyes, BID  budesonide, 0.5 mg, Nebulization,  BID  enoxaparin, 40 mg, Subcutaneous, Daily  formoterol, 20 mcg, Nebulization, BID  ipratropium-albuterol, 3 mL, Nebulization, TID  losartan, 25 mg, Oral, Daily  melatonin, 3 mg, Oral, HS  pantoprazole, 40 mg, Oral, Daily  predniSONE, 40 mg, Oral, Daily  umeclidinium, 1 puff, Inhalation, Daily      Continuous IV Infusions:     PRN Meds:  acetaminophen, 325 mg, Oral, Q6H PRN  albuterol, 2.5 mg, Nebulization, Q4H PRN  QUEtiapine, 12.5 mg, Oral, BID PRN        None    Network Utilization Review Department  ATTENTION: Please call with any questions or concerns to [phone] and carefully listen to the prompts so that you are directed to the right person. All voicemails are confidential.   For Discharge needs, contact Care Management DC Support Team at [phone] opt. [phone]  Send all requests for admission clinical reviews, approved or denied determinations and any other requests to dedicated fax number below belonging to the [place] where the patient is receiving treatment. List of dedicated fax numbers for the Facilities:  FACILITY NAME UR FAX NUMBER   ADMISSION DENIALS (Administrative/Medical Necessity) [phone]   DISCHARGE SUPPORT TEAM (NETWORK) [phone]   PARENT CHILD HEALTH (Maternity/NICU/Pediatrics) [phone]   [facility] [phone]   [facility] [phone]   [facility] [phone]   [facility] [phone]   [facility] [phone]   [facility] [phone]   [facility] [phone]   [facility] [phone]   [facility] [phone]   [facility] [phone]   [facility] [phone]   [facility] [phone]

## 2024-04-26 NOTE — QUICK NOTE
Patient seen and assessed at bedside with wife present.  Patient had an episode of desaturation on 3 L nasal cannula with lowest being 84% while sleeping.  Due to patient's prior history of CPAP noncompliance, transitioned to HFNC at 30 L / 50% with saturation of 89%.  Appears to be tolerating HFNC well and resting comfortably.  No signs of respiratory distress noted during assessment.  No wheezes noted upon auscultation.     Vitals reviewed and stable at the moment.  Will continue to monitor closely overnight.    Of note spoke with patient's daughter via phone and was updated on patient's medical management and treatment plan.    Plan:  - Titrate HFNC to keep saturation between 88% to 92%.  - If patient unable maintain saturation above 88% on HFNC or signs of respiratory distress noted, will transition patient to BiPAP.

## 2024-04-26 NOTE — CASE MANAGEMENT
Case Management Assessment & Discharge Planning Note    Patient name Severiano Feliciano  Location 7T U 703/7T [facility] 703-01 MRN [id number]  : 1935 Date 2024       Current Admission Date: 2024  Current Admission Diagnosis:Acute on chronic respiratory failure with hypoxia and hypercapnia (HCC)   Patient Active Problem List    Diagnosis Date Noted    Acute on chronic respiratory failure with hypoxia and hypercapnia (HCC) 2024    COPD exacerbation (HCC) 2024    Delirium 2024    Chronic bilateral low back pain without sciatica 2024    Pulmonary hypertension (HCC) 2024    Aneurysm, carotid artery, internal 2024    Unspecified severe protein-calorie malnutrition (HCC) 01/15/2024    Macrocytosis 2023    Constipation 2023    Prediabetes 10/25/2023    Bilateral hearing loss 10/25/2023    PAC (premature atrial contraction) 2023    Vitamin B12 deficiency 2023    Vitamin D deficiency 2023    Weight loss 2022    Supplemental oxygen dependent 2022    Rectal bleeding 2021    Hyperlipidemia 2021    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    Metabolic encephalopathy 2020    Dysphagia 2020    TIA (transient ischemic attack) 2020    S/P hernia repair 2019    Acquired renal cyst of left kidney 2019    Acute on chronic diastolic CHF (congestive heart failure) ([facility]) 2019    Thrombocytopenia (HCC) 2018    Varicose veins of both lower extremities with pain 2018    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HCC) 2018    Chronic respiratory failure with hypoxia (HCC) 2018    Descending aortic aneurysm (HCC) 2018    History of DVT (deep vein thrombosis) 2018    Chronic obstructive pulmonary disease (HCC) 2018    Benign essential hypertension 2017      LOS (days): 1  Geometric Mean LOS (GMLOS) (days):   Days to GMLOS:      OBJECTIVE:    Risk of Unplanned Readmission Score: 36.25      Current admission status: Inpatient       Preferred Pharmacy:   formerly [facility] - [place] - [address]  [address]  [place] [address]  Phone: [phone] Fax: [phone]    [facility] - [place] - [address]  [address]  [address]  [facility] [address]  Phone: [phone] Fax: [phone]    Primary Care Provider: Kirby Casanova MD    Primary Insurance: [employer]  REP  Secondary Insurance: [facility]    ASSESSMENT:  Active Health Care Proxies       Viridiana Zambrano [facility] Care Representative - Spouse   Primary Phone: [phone] (Mobile)  Home Phone: [phone]                    Readmission Root Cause  30 Day Readmission: No    Patient Information  Admitted from:: Home  Mental Status: Alert  During Assessment patient was accompanied by: Spouse  Assessment information provided by:: Son, Spouse  Primary Caregiver: Family  Caregiver's Name:: Julio Severiano Son  [phone]  Caregiver's Relationship to Patient:: Family Member  Caregiver's Telephone Number:: Julio Severiano Son  [phone]  Support Systems: Spouse/significant other, Family members, Self, Children  County of Residence: [place]  What [facility] do you live in?: [place]  Home entry access options. Select all that apply.: Stairs  Number of steps to enter home.: 5  Type of Current Residence: 2 story home  Upon entering residence, is there a bedroom on the main floor (no further steps)?: No  A bedroom is located on the following floor levels of residence (select all that apply):: 2nd Floor  Upon entering residence, is there a bathroom on the main floor (no further steps)?: Yes  Number of steps to 2nd floor from main floor: One Flight  Living Arrangements: Lives w/ Spouse/significant other, Lives w/ Son  Is patient a ?: No    Activities of Daily Living Prior to  Admission  Functional Status: Total dependent  Completes ADLs independently?: No  Level of ADL dependence: Assistance  Ambulates independently?: Yes  Does patient use assisted devices?: Yes  Assisted Devices (DME) used: Home Oxygen concentrator, Portable Oxygen concentrator, Straight Cane  DME Company Name (respiratory supplies): O2 from [employer]  O2 Rate(s): 2-3 LPM  Does patient currently own DME?: Yes  What DME does the patient currently own?: Home Oxygen concentrator, Portable Oxygen concentrator, Straight Cane, Walker, Shower Chair, BiPAP  Does patient have a history of Outpatient Therapy (PT/OT)?: Yes (Going to [facility])  Does the patient have a history of Short-Term Rehab?: No  Does patient have a history of HHC?: No  Does patient currently have HHC?: No    Patient Information Continued  Income Source: Pension/custodial  Does patient have prescription coverage?: Yes  Does patient receive dialysis treatments?: No  Does patient have a history of substance abuse?: No  Does patient have a history of Mental Health Diagnosis?: No    Means of Transportation  Means of Transport to Appts:: Family transport      Social Determinants of Health (SDOH)      Flowsheet Row Most Recent Value   Housing Stability    In the last 12 months, was there a time when you were not able to pay the mortgage or rent on time? N   In the last 12 months, how many places have you lived? 1   In the last 12 months, was there a time when you did not have a steady place to sleep or slept in a shelter (including now)? N   Transportation Needs    In the past 12 months, has lack of transportation kept you from medical appointments or from getting medications? no   In the past 12 months, has lack of transportation kept you from meetings, work, or from getting things needed for daily living? No   Food Insecurity    Within the past 12 months, you worried that your food would run out before you got the money to buy more. Never true   Within  the past 12 months, the food you bought just didn't last and you didn't have money to get more. Never true   Utilities    In the past 12 months has the electric, gas, oil, or water company threatened to shut off services in your home? No          DISCHARGE DETAILS:    Discharge planning discussed with:: Spouse and son Daljit  Freedom of Choice: Yes     CM contacted family/caregiver?: Yes     Did patient/caregiver verbalize understanding of patient care needs?: Yes     Contacts  Patient Contacts: Julio Severiano Son  [phone]  Relationship to Patient:: Family  Contact Method: Phone  Phone Number: Julio Severiano Son  [phone]  Reason/Outcome: Emergency Contact, Discharge Planning    Other Referral/Resources/Interventions Provided:  Interventions: DME      Treatment Team Recommendation: Home         Additional Comments: Met witgh patient and spouse at bedside.  Patient very [place] and did not answer any questions.  Spouse requests call to son Julio Severiano  Son  [phone]   for additional info.  Son is paid caregiver thru waiver 40 hrs a week.  Patient has an [employer] O2 concentrator but per spouse and son it shuts off and is not working.  Call to [facility] [phone] and talked to [facility].  Requested portable tanks be delivered to hospital for discharge and need for f/u with [employer] at home.  [facility] will message delivery to reach out to CM and will have tanks delivered.  Therapy eval completed and recs are OP therapy patient has scheduled appointments with [facility] joana/pulm OP therapy.   CM department followingthru discharge

## 2024-04-26 NOTE — PHYSICAL THERAPY NOTE
Physical Therapy Evaluation    Patient's Name: Severiano Feliciano    Admitting Diagnosis  COPD (chronic obstructive pulmonary disease) (HCC) [J44.9]  Hypertension [I10]  Hypoxia [R09.02]  Borderline low oxygen saturation level [R79.81]    Problem List  Patient Active Problem List   Diagnosis    Descending aortic aneurysm (HCC)    History of DVT (deep vein thrombosis)    Benign essential hypertension    Chronic respiratory failure with hypoxia (HCC)    Varicose veins of both lower extremities with pain    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HCC)    Thrombocytopenia (HCC)    Acute on chronic diastolic CHF (congestive heart failure) (HCC)    Acquired renal cyst of left kidney    TIA (transient ischemic attack)    Dysphagia    Metabolic encephalopathy    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)    Rectal bleeding    Hyperlipidemia    Weight loss    S/P hernia repair    Supplemental oxygen dependent    Vitamin B12 deficiency    Vitamin D deficiency    Chronic obstructive pulmonary disease (HCC)    PAC (premature atrial contraction)    Prediabetes    Bilateral hearing loss    Constipation    Macrocytosis    Unspecified severe protein-calorie malnutrition (HCC)    Aneurysm, carotid artery, internal    Pulmonary hypertension (HCC)    Chronic bilateral low back pain without sciatica    Acute on chronic respiratory failure with hypoxia and hypercapnia (HCC)    COPD exacerbation (HCC)    Delirium       Past Medical History  Past Medical History:   Diagnosis Date    Acute metabolic encephalopathy 06/13/2020    Age-related cataract of right eye 04/04/2023    patient is medically cleared and presents with low risk of complication with this upcoming R cataract surgery.    Anemia     Aneurysm, aorta, thoracic (HCC)     Appendicolith     Ascending aortic aneurysm (HCC)     3.7    Asthma     BPH (benign prostatic hyperplasia)     CAD (coronary artery disease)     noted on CT scan    CHF (congestive heart  failure) (HCC)     COPD (chronic obstructive pulmonary disease) (HCC)     Descending thoracic aortic aneurysm (HCC)     Diabetes mellitus (HCC)     Diverticulosis     Former tobacco use     GERD (gastroesophageal reflux disease)     History of DVT (deep vein thrombosis)     Left leg    History of transfusion     Hypertension     Hypoxemia 04/30/2023    Inguinal hernia     right    Nephrolithiasis     Oxygen dependent     2LNC    Oxygen dependent     Pneumonia     Pre-diabetes     Prostate calculus     PVD (peripheral vascular disease) (HCC)     Recurrent right inguinal hernia w incarcertion 01/04/2023    Thoracic aortic aneurysm without rupture (HCC) 11/19/2018    Added automatically from request for surgery [id number]    Thrombocytopenia (HCC) 12/29/2018    Ulcer     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)     Varicose vein of leg     b/l       Past Surgical History  Past Surgical History:   Procedure Laterality Date    CARDIAC SURGERY      ESOPHAGOGASTRODUODENOSCOPY      HERNIA REPAIR Right 1/21/2019    Procedure: REPAIR HERNIA INGUINAL WITH MESH;  Surgeon: David Lowry MD;  Location:  MAIN OR;  Service: General    HERNIA REPAIR Right 7/22/2023    Procedure: REPAIR RECURRENT INCARERATED HERNIA INGUINAL OPEN, RIGHT ORCHIECTOMY;  Surgeon: Mason Bertrand MD;  Location: [facility];  Service: General    INGUINAL HERNIA REPAIR Bilateral     IR TEVAR  12/27/2018    TX EVASC RPR DTA COVERAGE ART ORIGIN 1ST ENDOPROSTH N/A 12/27/2018    Procedure: TEVAR - endovascular thoracic aortic aneurysm repair;  Surgeon: Gladis Ortega MD;  Location: [facility];  Service: Vascular    THORACIC AORTIC ANEURYSM REPAIR  12/27/2018    VARICOSE VEIN SURGERY Bilateral     vein stripping       Recent Imaging  XR chest 1 view portable   Final Result by Rico Aquino MD (04/25 1198)      There is emphysema. There is persistent right basilar atelectasis or scarring and mild asymmetric prominence of the lung markings  on the right versus the left which seems stable. No significant new abnormality appreciated            Workstation performed: [id number]             Recent Vital Signs  Vitals:    04/26/24 0516 04/26/24 0559 04/26/24 0703 04/26/24 0900   BP:   (!) 177/91    BP Location:   Left arm    Pulse:   70    Resp:   20    Temp:   (!) 97.3 °F (36.3 °C)    TempSrc:   Temporal    SpO2: 96% 96% 100% 93%   Weight:       Height:            04/26/24 0910   PT Last Visit   PT Visit Date 04/26/24   Note Type   Note type Evaluation   Pain Assessment   Pain Assessment Tool 0-10   Pain Score No Pain   Restrictions/Precautions   Weight Bearing Precautions Per Order No   Other Precautions O2;Multiple lines   Home Living   Type of Home House   Home Layout Two level   Prior Function   Level of [place] Independent with ADLs;Independent with functional mobility   Lives With Spouse   Receives Help From Family   Vocational Retired   General   Family/Caregiver Present Yes   Cognition   Arousal/Participation Alert   Orientation Level Oriented X4   Memory Within functional limits   Following Commands Follows all commands and directions without difficulty   RLE Assessment   RLE Assessment   (3+/5)   LLE Assessment   LLE Assessment   (3+/5)   Coordination   Movements are Fluid and Coordinated 0   Coordination and Movement Description decreased gross motor coordination   Sensation X   Light Touch   RLE Light Touch Impaired   RLE Light Touch Comments decreased in distal LE   LLE Light Touch Impaired   LLE Light Touch Comments decreased distally LEs   Bed Mobility   Supine to Sit 6  Modified independent   Sit to Supine 6  Modified independent   Transfers   Sit to Stand 5  Supervision   Stand to Sit 5  Supervision   Ambulation/Elevation   Gait pattern Step through pattern;Decreased toe off;Decreased heel strike;Short stride;Decreased foot clearance;Improper Weight shift   Gait Assistance 5  Supervision   Additional items Verbal cues   Assistive  Device Rolling walker   Distance 20ft   Balance   Static Sitting Fair +   Dynamic Sitting Fair +   Static Standing Fair   Dynamic Standing Fair -   Ambulatory Fair -   Endurance Deficit   Endurance Deficit Yes   Endurance Deficit Description reduced from baseline due to SOB and fatigue   Activity Tolerance   Activity Tolerance Patient limited by fatigue   Medical Staff Made Aware spoke to CM   Nurse Made Aware spoke to RN   Assessment   Prognosis Good   Problem List Decreased strength;Decreased endurance;Impaired balance;Decreased mobility;Decreased coordination;Impaired sensation   Barriers to Discharge Inaccessible home environment   Goals   Patient Goals to go home when better   STG Expiration Date 05/06/24   Short Term Goal #1 see eval note   Plan   Treatment/Interventions ADL retraining;Functional transfer training;LE strengthening/ROM;Elevations;Therapeutic exercise;Endurance training;Patient/family training;Equipment eval/education;Bed mobility;Gait training;Spoke to nursing;Spoke to case management;OT   PT Frequency 2-3x/wk   Discharge Recommendation   Rehab Resource Intensity Level, PT III (Minimum Resource Intensity)   Equipment Recommended Walker   Walker Package Recommended Wheeled walker   AM-PAC Basic Mobility Inpatient   Turning in Flat Bed Without Bedrails 3   Lying on Back to Sitting on Edge of Flat Bed Without Bedrails 3   Moving Bed to Chair 3   Standing Up From Chair Using Arms 3   Walk in Room 3   Climb 3-5 Stairs With Railing 2   Basic Mobility Inpatient Raw Score 17   Basic Mobility Standardized Score 39.67   [facility] Highest Level Of Mobility   -HLM Goal 5: Stand one or more mins   -HLM Achieved 6: Walk 10 steps or more   End of Consult   Patient Position at End of Consult Bedside chair;All needs within reach       ASSESSMENT                                                                                                                     Severiano Feliciano is a 88 y.o. male  admitted to [facility] on 4/25/2024 for Acute on chronic respiratory failure with hypoxia and hypercapnia (HCC). Pt  has a past medical history of Acute metabolic encephalopathy (06/13/2020), Age-related cataract of right eye (04/04/2023), Anemia, Aneurysm, aorta, thoracic (HCC), Appendicolith, Ascending aortic aneurysm (HCC), Asthma, BPH (benign prostatic hyperplasia), CAD (coronary artery disease), CHF (congestive heart failure) (HCC), COPD (chronic obstructive pulmonary disease) (HCC), Descending thoracic aortic aneurysm (HCC), Diabetes mellitus (HCC), Diverticulosis, Former tobacco use, GERD (gastroesophageal reflux disease), History of DVT (deep vein thrombosis), History of transfusion, Hypertension, Hypoxemia (04/30/2023), Inguinal hernia, Nephrolithiasis, Oxygen dependent, Oxygen dependent, Pneumonia, Pre-diabetes, Prostate calculus, PVD (peripheral vascular disease) ([facility]), Recurrent right inguinal hernia w incarcertion (01/04/2023), Thoracic aortic aneurysm without rupture (HCC) (11/19/2018), Thrombocytopenia ([facility]) (12/29/2018), Ulcer, 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 PT was consulted and pt was seen on 4/26/2024 for mobility assessment and d/c planning.  Impairments limiting pt at this time include decreased ROM, impaired balance, decreased endurance, decreased coordination, new onset of impairment of functional mobility, decreased IADLS, decreased activity tolerance, SOB upon exertion, decreased sensation, and decreased strength. Pt is currently functioning at a modified independent assistance level for bed mobility, supervision assistance x1 level for transfers, supervision assistance x1 level for ambulation with Rolling Walker. The patient's AM-PAC Basic Mobility Inpatient Short Form Raw Score is 17. A Raw score of greater than 16 suggests the patient may benefit from discharge to home. Please also refer to the recommendation of the Physical Therapist for  safe discharge planning.    Goals                                                                                                                                    1) Bed mobility skills with modified independent assistance to facilitate safe return to previous living environment 2) Functional transfers with modified independent assistance to facilitate safe return to previous living environment  3) Ambulation with least restrictive AD modified independent assistance without LOB and stable vitals for safe ambulation home/ community distances. 4) Stair training up/down flight 12 step/s with appropriate rail/s  and modified independent assistance for safe access to previous living environment. 5) Improve balance grades to fair + to reduce risk of falls. 6)Improve LE strength grades by 1 to increase independence w/ transfers and gait.  7) PT for ongoing pt and family education; DME needs and D/C planning to promote highest level of function in least restrictive environment.     Recommendations                                                                                                              Pt will benefit from continued skilled IP PT to address the above mentioned impairments in order to maximize recovery and increase functional independence when completing mobility and ADLs. See flow sheet for goals and POC.     DME: Rolling Walker    Discharge Disposition:  Home with Home Physical Therapy vs continue cardiac rehab if able at D/C      Kulwant Gross PT, DPT

## 2024-04-26 NOTE — NURSING NOTE
Pt still agitated and refusing to wear HFNC.  Pt was given haldol to calm down so that he can tolerate the HFNC

## 2024-04-26 NOTE — PLAN OF CARE
Problem: Potential for Falls  Goal: Patient will remain free of falls  Description: INTERVENTIONS:  - Educate patient/family on patient safety including physical limitations  - Instruct patient to call for assistance with activity   - Consult OT/PT to assist with strengthening/mobility   - Keep Call bell within reach  - Keep bed low and locked with side rails adjusted as appropriate  - Keep care items and personal belongings within reach  - Initiate and maintain comfort rounds  - Make Fall Risk Sign visible to staff  - Offer Toileting every *** Hours, in advance of need  - Initiate/Maintain ***alarm  - Obtain necessary fall risk management equipment: ***  - Apply yellow socks and bracelet for high fall risk patients  - Consider moving patient to room near nurses station  Outcome: Progressing

## 2024-04-26 NOTE — DISCHARGE SUMMARY
"Discharge Summary - [facility]    Patient Information: Severiano Feliciano 88 y.o. male MRN: [id number]  Unit/Bed#: 7T [facility] 703-01 Encounter: [id number]    Discharging Physician / Practitioner: Corine Banuelos MD  PCP: Kirby Casanova MD  Admission Date:   Admission Orders (From admission, onward)       Ordered        04/25/24 1022  Inpatient Admission  Once                          Discharge Date: 4/27/2024    Reason for Admission: Acute on chronic respiratory failure with hypoxia and hypercapnia/COPD exacerbation     Discharge Diagnoses:     Principal Problem (Resolved):    Acute on chronic respiratory failure with hypoxia and hypercapnia (HCC)  Active Problems:    Benign essential hypertension    Bilateral hearing loss    Delirium  Resolved Problems:    COPD exacerbation (HCC)        * Acute on chronic respiratory failure with hypoxia and hypercapnia (HCC)-resolved as of 4/27/2024  Assessment & Plan  O2 at home 2-3 L   Currently on nasal canula FiO2 30 %. 4 L   Chronic CO2 retainer but Bicarb and CO2 continue to improve.     Plan:  - Continue O2 supplementation with nasal canula.   - Use O2 tanks until [employer] is able to replace batteries in portable machine   - CPAP at bedtime.   - Plan per COPD management.        COPD exacerbation (HCC)-resolved as of 4/27/2024  Assessment & Plan  O2 supplementation at 2 L at baseline.- non always adherent.   CPAP at night - non adherent   Flu/Covid/ RSV swab negative.   S/P solumedrol 125 mg IV in the ED.     Plan:  - Continue Azithromycin 250 mg daily for 2 more days  - Prednisone 40 mg daily for 2 more days  - Continue home med: Duoneb TID, Albuterol as needed, budesonide BID.  - F/u with Pulmonology     Delirium  Assessment & Plan  Is able to be redirected by wife at bedside.   Differential diagnosis include: hypercapnia vs hypoxic injury  Likely underlying dementia per CT head (12/5/23): \"....right frontal lobe encephalomalacia\"     Plan:  - O2 " supplementation and CPAP at night to prevent hypercapnic episodes.      Bilateral hearing loss  Assessment & Plan  Patient has difficulty hearing at baseline. Pt noncompliant with use of hearing aids.  Conductive and sensoneural hearing loss, last ENT visit 3/18/24.    - Continue use of hearing aids  - f/u with ENT for possible myringotomy     Benign essential hypertension  Assessment & Plan  BP Readings from Last 3 Encounters:   04/27/24 160/84   03/25/24 140/80   02/21/24 (!) 174/89    Above goal.   BP medication at home: losartan 25 mg.     Plan:  - continue losartan 25 mg daily         Consultations During Hospital Stay:  None    Procedures Performed:   None    Significant Findings / Test Results:   CO2: 75.1>>73.5; HCO3: 41.2>>40.0  BNP: 154    Incidental Findings:   TSH: 0.258; T4:0.68      Test Results Pending at Discharge (will require follow up):   None     Outpatient Tests Requested:  None    Outpatient follow-up Requested:  Pulmonology  ENT    Complications:  None    Hospital Course:     Severiano Feliciano is a 88 y.o. male patient who originally presented to the hospital on 4/25/2024 due to confusion.  Before presenting to the hospital, patient had woken up confused and in an agitated state.  His wife checked his blood pressure and found to be high with a low oxygen saturation.  He normally uses 2 to 3 L of oxygen with nasal cannula at baseline but was non-adherent during with his nighttime CPAP machine.  EMS was called, patient found to be saturating at 60% and put on supplemental oxygen before arriving in the ED. When assessed in the ED, patient was visibly agitated and difficult to redirect. Unable to provide history, wife was at bedside and she was able to redirect him somewhat.  He was admitted for COPD exacerbation/Acute on chronic respiratory failure.    During hospital stay, patient was started on steroids, azithromycin and breathing treatments.  Patient continued to require supplemental oxygen;  "2L-3L during the daytime but would desat at nighttime requiring mid-flow to high-flow (5L-9L) oxygenation.  He declined the use of CPAP machine throughout his hospital stay.  Wife remained with patient during hospital stay to help with redirection.    On day of discharge, patient observed to be in no distress and stable.  Wife is concerned about portable O2 concentrator and non-functioning battery.  She was informed that case management reached out to [facility] for replacement.  In the meantime, patient is to use O2 tanks that were delivered to room prior to discharge until [facility] can replace batteries in portable machine. Respiratory therapy assessed patient with no new recommendations except to use CPAP at night. Patient and wife instructed to use home CPAP machine at night to prevent  desaturations at bedtime.  Patient and family agreeable with plan for discharge.     Review of Systems   Unable to perform ROS: Mental status change       Condition at Discharge: stable     Discharge Day Visit / Exam:     Vitals: Blood Pressure: 160/84 (04/27/24 0728)  Pulse: 76 (04/27/24 0728)  Temperature: 98 °F (36.7 °C) (04/27/24 0728)  Temp Source: Temporal (04/27/24 0728)  Respirations: 18 (04/27/24 0728)  Height: 5' 3\" (160 cm) (04/25/24 1154)  Weight - Scale: 60.1 kg (132 lb 9 oz) (04/25/24 1154)  SpO2: 94 % (04/27/24 1200)    Exam:   Physical Exam  HENT:      Head: Normocephalic.      Nose: Nose normal. No congestion or rhinorrhea.      Mouth/Throat:      Mouth: Mucous membranes are moist.      Pharynx: No posterior oropharyngeal erythema.   Eyes:      General: No scleral icterus.     Extraocular Movements: Extraocular movements intact.      Conjunctiva/sclera: Conjunctivae normal.   Cardiovascular:      Rate and Rhythm: Normal rate and regular rhythm.      Pulses: Normal pulses.      Heart sounds: Normal heart sounds. No murmur heard.  Pulmonary:      Effort: Pulmonary effort is normal. No respiratory distress.      Breath " sounds: No wheezing or rales.      Comments: On O2 NC  Abdominal:      General: Abdomen is flat. Bowel sounds are normal. There is no distension.      Palpations: Abdomen is soft.   Musculoskeletal:      Cervical back: Normal range of motion and neck supple. No rigidity.      Right lower leg: No edema.      Left lower leg: No edema.   Skin:     General: Skin is warm.      Coloration: Skin is not jaundiced or pale.   Neurological:      Mental Status: He is alert.   Psychiatric:         Mood and Affect: Mood normal.         Discussion with Family: Yes  Patient Information Sharing: With the consent of Severiano Feliciano , their loved ones (Viridiana) were notified today by inpatient team of the patient’s condition and current plan.  All questions answered.    Discharge instructions/Information to patient and family:   See after visit summary for information provided to patient and family.      Discharge Medications:  Current Outpatient Medications   Medication Instructions    acetaminophen (TYLENOL) 650 mg, Oral, Every 8 hours PRN    [START ON 4/28/2024] azithromycin (ZITHROMAX) 250 mg, Oral, Every 24 hours    brimonidine tartrate 0.2 % ophthalmic solution INSTILL 1 DROP IN LEFT EYE TWICE DAILY INSTILL 1 DROP IN LEFT EYE DOS VECES AL EULALIO    budesonide (PULMICORT) 0.5 mg, Nebulization, 2 times daily, Rinse mouth after use.    fluticasone-umeclidinium-vilanterol (Trelegy Ellipta) 200-62.5-25 mcg/actuation AEPB inhaler 1 puff, Inhalation, Daily, Rinse mouth after use.    formoterol (PERFOROMIST) 20 mcg, Nebulization, 2 times daily    ipratropium-albuterol (DUO-NEB) 0.5-2.5 mg/3 mL nebulizer solution 3 mL, Nebulization, 2 times daily    losartan (COZAAR) 25 mg tablet TAKE ONE TABLET BY MOUTH TWICE DAILYTOMAR 1 TABLETA POR VIA ORAL DOS VECES AL EULALIO    mometasone (ELOCON) 0.1 % lotion Topical, Daily    pantoprazole (PROTONIX) 40 mg, Oral, Daily    [START ON 4/28/2024] predniSONE 40 mg, Oral, Daily    senna-docusate sodium  (SENOKOT S) 8.6-50 mg per tablet 1 tablet, Oral, Daily      Provisions for Follow-Up Care:  See after visit summary for information related to follow-up care and any pertinent home health orders.      Disposition:     Home    For Discharges to [facility] SNF:   Not Applicable to this Patient - Not Applicable to this Patient    Planned Readmission: No     Discharge Statement:  I spent 30 minutes discharging the patient. This time was spent on the day of discharge. I had direct contact with the patient on the day of discharge. Greater than 50% of the total time was spent examining patient, answering all patient questions, arranging and discussing plan of care with patient as well as directly providing post-discharge instructions.  Additional time then spent on discharge activities.    ** Please Note: This note has been constructed using a voice recognition system **    Patricia Kam MD  04/27/24  3:54 PM

## 2024-04-26 NOTE — OCCUPATIONAL THERAPY NOTE
Occupational Therapy Evaluation     Patient Name: Severiano Feliciano  Today's Date: 4/26/2024  Problem List  Principal Problem:    Acute on chronic respiratory failure with hypoxia and hypercapnia (HCC)  Active Problems:    Benign essential hypertension    Bilateral hearing loss    COPD exacerbation (HCC)    Delirium    Past Medical History  Past Medical History:   Diagnosis Date    Acute metabolic encephalopathy 06/13/2020    Age-related cataract of right eye 04/04/2023    patient is medically cleared and presents with low risk of complication with this upcoming R cataract surgery.    Anemia     Aneurysm, aorta, thoracic (HCC)     Appendicolith     Ascending aortic aneurysm (HCC)     3.7    Asthma     BPH (benign prostatic hyperplasia)     CAD (coronary artery disease)     noted on CT scan    CHF (congestive heart failure) (HCC)     COPD (chronic obstructive pulmonary disease) (HCC)     Descending thoracic aortic aneurysm (HCC)     Diabetes mellitus (HCC)     Diverticulosis     Former tobacco use     GERD (gastroesophageal reflux disease)     History of DVT (deep vein thrombosis)     Left leg    History of transfusion     Hypertension     Hypoxemia 04/30/2023    Inguinal hernia     right    Nephrolithiasis     Oxygen dependent     2LNC    Oxygen dependent     Pneumonia     Pre-diabetes     Prostate calculus     PVD (peripheral vascular disease) (HCC)     Recurrent right inguinal hernia w incarcertion 01/04/2023    Thoracic aortic aneurysm without rupture (HCC) 11/19/2018    Added automatically from request for surgery [id number]    Thrombocytopenia (HCC) 12/29/2018    Ulcer     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)     Varicose vein of leg     b/l     Past Surgical History  Past Surgical History:   Procedure Laterality Date    CARDIAC SURGERY      ESOPHAGOGASTRODUODENOSCOPY      HERNIA REPAIR Right 1/21/2019    Procedure: REPAIR HERNIA INGUINAL WITH MESH;  Surgeon: David Lowry MD;  Location:  [facility];  Service: General    HERNIA REPAIR Right 7/22/2023    Procedure: REPAIR RECURRENT INCARERATED HERNIA INGUINAL OPEN, RIGHT ORCHIECTOMY;  Surgeon: Mason Bertrand MD;  Location: [facility];  Service: General    INGUINAL HERNIA REPAIR Bilateral     IR TEVAR  12/27/2018    DC EVASC RPR DTA COVERAGE ART ORIGIN 1ST ENDOPROSTH N/A 12/27/2018    Procedure: TEVAR - endovascular thoracic aortic aneurysm repair;  Surgeon: Gladis Ortega MD;  Location: [facility];  Service: Vascular    THORACIC AORTIC ANEURYSM REPAIR  12/27/2018    VARICOSE VEIN SURGERY Bilateral     vein stripping             04/26/24 1014   OT Last Visit   OT Visit Date 04/26/24   Note Type   Note type Evaluation   Pain Assessment   Pain Assessment Tool 0-10   Pain Score No Pain   Restrictions/Precautions   Weight Bearing Precautions Per Order No   Other Precautions O2;Multiple lines   Home Living   Type of Home House   Home Layout Two level   Bathroom Shower/Tub Walk-in shower   Bathroom Toilet Standard   Home Equipment   (none)   Prior Function   Level of [place] Independent with ADLs   Lives With Spouse   Receives Help From Family   Vocational Retired   ADL   Grooming Assistance 5  Supervision/Setup   UB Bathing Assistance 5  Supervision/Setup   LB Bathing Assistance 5  Supervision/Setup   UB Dressing Assistance 5  Supervision/Setup   LB Dressing Assistance 5  Supervision/Setup   Toileting Assistance  5  Supervision/Setup   Bed Mobility   Supine to Sit 5  Supervision   Sit to Supine 5  Supervision   Transfers   Sit to Stand 5  Supervision   Stand to Sit 5  Supervision   Toilet transfer 5  Supervision   Functional Mobility   Functional Mobility 5  Supervision   Balance   Static Sitting Good   Dynamic Sitting Fair +   Static Standing Fair   Dynamic Standing Fair   Ambulatory Fair   Activity Tolerance   Activity Tolerance Patient tolerated treatment well   RUE Assessment   RUE Assessment WFL   LUE Assessment   LUE Assessment WFL   Cognition    Arousal/Participation Alert;Cooperative   Attention Within functional limits   Orientation Level Oriented X4   Memory Within functional limits   Following Commands Follows all commands and directions without difficulty   Assessment   Limitation Decreased high-level ADLs;Decreased endurance   Assessment   Pt is a 88 y.o. male seen for OT evaluation s/p admit to [facility] on 4/25/2024 w/ Acute on chronic respiratory failure with hypoxia and hypercapnia (HCC).  See medical history above for extensive list of comorbidities affecting pt's functional performance at time of assessment. Personal factors affecting Pt at time of IE include:health management . Upon evaluation: Pt requires supervision for functional ambulation including bathroom mobility and negotiation of small spaces, supervision for ADL transfers.  Pt requires supervision for ADLs in standing. Pt's primary barrier(s) at this time: decreased endurance, noted SOB with prolonged standing activity. Pt required seated rest break following standing grooming tasks at the sink and toileting. The following deficits impact occupational performance: weakness, decreased strength, decreased balance, and decreased tolerance. Pt to benefit from continued skilled OT services while in the hospital to address deficits as defined above and maximize level of functional independence w ADL's and functional mobility. Occupational performance areas to address include: bathing/shower, toilet hygiene, dressing, health maintenance, functional mobility, and clothing management. From OT standpoint, recommendation at time of d/c would be minimum resource intensity.       Goals   STG Time Frame   (1 week)   Short Term Goals Pt will complete functional ambulation Kaitlynn.   Pt will complete ADL transfers Kaitlynn.   Pt will complete toilet hygiene Kaitlynn.    Plan   Treatment Interventions ADL retraining;UE strengthening/ROM;Endurance training;Continued evaluation;Activityengagement   Goal Expiration  Date 05/03/24   OT Frequency 2-3x/wk   Discharge Recommendation   Rehab Resource Intensity Level, OT III (Minimum Resource Intensity)   AM-PAC Daily Activity Inpatient   Lower Body Dressing 3   Bathing 3   Toileting 3   Upper Body Dressing 3   Grooming 4   Eating 4   Daily Activity Raw Score 20   Daily Activity Standardized Score (Calc for Raw Score >=11) 42.03

## 2024-04-26 NOTE — QUICK NOTE
Patient became agitated and began ripping off HFNC. Was then transitioned to 3 L NC with saturation of 85% but eventually titrated up to 6 L NC with adequate saturation of 94%.  No signs of respiratory distress noted.    Patient refused p.o. Seroquel prn for agitation despite several attempts  and was then given a dose of IM Haldol 2 mg.    Will continue to monitor vital signs closely and also for any signs of respiratory distress.      Plan:  Titrate O2 as tolerated to keep sat between 88% to 92%.  Monitor closely for any signs of respiratory distress  Monitor vital signs closely

## 2024-04-27 VITALS
BODY MASS INDEX: 23.49 KG/M2 | HEIGHT: 63 IN | OXYGEN SATURATION: 94 % | WEIGHT: 132.56 LBS | TEMPERATURE: 98 F | DIASTOLIC BLOOD PRESSURE: 84 MMHG | HEART RATE: 76 BPM | SYSTOLIC BLOOD PRESSURE: 160 MMHG | RESPIRATION RATE: 18 BRPM

## 2024-04-27 PROBLEM — J96.21 ACUTE ON CHRONIC RESPIRATORY FAILURE WITH HYPOXIA AND HYPERCAPNIA (HCC): Status: RESOLVED | Noted: 2024-04-25 | Resolved: 2024-04-27

## 2024-04-27 PROBLEM — J44.1 COPD EXACERBATION (HCC): Status: RESOLVED | Noted: 2024-04-25 | Resolved: 2024-04-27

## 2024-04-27 PROBLEM — J96.22 ACUTE ON CHRONIC RESPIRATORY FAILURE WITH HYPOXIA AND HYPERCAPNIA (HCC): Status: RESOLVED | Noted: 2024-04-25 | Resolved: 2024-04-27

## 2024-04-27 LAB
ANION GAP SERPL CALCULATED.3IONS-SCNC: 7 MMOL/L (ref 4–13)
BUN SERPL-MCNC: 30 MG/DL (ref 5–25)
CALCIUM SERPL-MCNC: 9.1 MG/DL (ref 8.4–10.2)
CHLORIDE SERPL-SCNC: 98 MMOL/L (ref 96–108)
CO2 SERPL-SCNC: 41 MMOL/L (ref 21–32)
CREAT SERPL-MCNC: 0.86 MG/DL (ref 0.6–1.3)
GFR SERPL CREATININE-BSD FRML MDRD: 78 ML/MIN/1.73SQ M
GLUCOSE SERPL-MCNC: 92 MG/DL (ref 65–140)
POTASSIUM SERPL-SCNC: 4 MMOL/L (ref 3.5–5.3)
SODIUM SERPL-SCNC: 146 MMOL/L (ref 135–147)

## 2024-04-27 PROCEDURE — 94760 N-INVAS EAR/PLS OXIMETRY 1: CPT

## 2024-04-27 PROCEDURE — 80048 BASIC METABOLIC PNL TOTAL CA: CPT

## 2024-04-27 PROCEDURE — 99239 HOSP IP/OBS DSCHRG MGMT >30: CPT | Performed by: FAMILY MEDICINE

## 2024-04-27 PROCEDURE — 94640 AIRWAY INHALATION TREATMENT: CPT

## 2024-04-27 RX ORDER — PREDNISONE 20 MG/1
40 TABLET ORAL DAILY
Qty: 4 TABLET | Refills: 0 | Status: SHIPPED | OUTPATIENT
Start: 2024-04-28 | End: 2024-04-27

## 2024-04-27 RX ORDER — AZITHROMYCIN 250 MG/1
250 TABLET, FILM COATED ORAL EVERY 24 HOURS
Qty: 2 TABLET | Refills: 0 | Status: SHIPPED | OUTPATIENT
Start: 2024-04-28 | End: 2024-04-27

## 2024-04-27 RX ORDER — AZITHROMYCIN 250 MG/1
250 TABLET, FILM COATED ORAL EVERY 24 HOURS
Qty: 2 TABLET | Refills: 0 | Status: SHIPPED | OUTPATIENT
Start: 2024-04-28 | End: 2024-04-30

## 2024-04-27 RX ORDER — PREDNISONE 20 MG/1
40 TABLET ORAL DAILY
Qty: 4 TABLET | Refills: 0 | Status: SHIPPED | OUTPATIENT
Start: 2024-04-28 | End: 2024-04-30

## 2024-04-27 RX ADMIN — IPRATROPIUM BROMIDE AND ALBUTEROL SULFATE 3 ML: 2.5; .5 SOLUTION RESPIRATORY (INHALATION) at 08:29

## 2024-04-27 RX ADMIN — FORMOTEROL FUMARATE DIHYDRATE 20 MCG: 20 SOLUTION RESPIRATORY (INHALATION) at 08:29

## 2024-04-27 RX ADMIN — BUDESONIDE 0.5 MG: 0.5 INHALANT RESPIRATORY (INHALATION) at 08:29

## 2024-04-27 RX ADMIN — AZITHROMYCIN DIHYDRATE 250 MG: 250 TABLET ORAL at 09:22

## 2024-04-27 RX ADMIN — PANTOPRAZOLE SODIUM 40 MG: 40 TABLET, DELAYED RELEASE ORAL at 06:07

## 2024-04-27 RX ADMIN — PREDNISONE 40 MG: 20 TABLET ORAL at 09:22

## 2024-04-27 RX ADMIN — ENOXAPARIN SODIUM 40 MG: 40 INJECTION SUBCUTANEOUS at 09:22

## 2024-04-27 RX ADMIN — LOSARTAN POTASSIUM 25 MG: 25 TABLET, FILM COATED ORAL at 09:22

## 2024-04-27 NOTE — NURSING NOTE
PT was D/C to home D/C instruction was given to PT and PT verbalized understanding of D/C instruction.  All personal belonging was given to PT.  IV was D/C no distress noted.  7 T staff accompany PT to lobby.

## 2024-04-27 NOTE — DISCHARGE INSTR - AVS FIRST PAGE
Please make sure you use the oxygen tanks until [facility] is able to fix the portable machine batteries. You can give them a call on [phone].  Use CPAP machine at night

## 2024-04-28 NOTE — UTILIZATION REVIEW
NOTIFICATION OF INPATIENT MEDICAL ADMISSION   AUTHORIZATION REQUEST   SERVICING FACILITY:   [address] [address]  Tax ID: [id number]  NPI: [id number] ATTENDING PROVIDER:  Attending Name and NPI#: Corine Banuelos Md [[id number]]  Address: [address] [place] [address]  Phone: [phone]     ADMISSION INFORMATION:  Place of Service: Inpatient [facility]  Place of Service Code: 21  Inpatient Admission Date/Time: 4/25/24 10:22 AM  Discharge Date/Time: 4/27/2024  3:24 PM  Admitting Diagnosis Code/Description:  COPD (chronic obstructive pulmonary disease) (HCC) [J44.9]  Hypertension [I10]  Hypoxia [R09.02]  Borderline low oxygen saturation level [R79.81]     UTILIZATION REVIEW CONTACT:  Cat Loredo, Utilization   Network Utilization Review Department  Phone: [phone]  Fax [phone]  Email: [email or web address]  Contact for approvals/pending authorizations, clinical reviews, and discharge.     PHYSICIAN ADVISORY SERVICES:  Medical Necessity Denial & Iuug-om-Xbwm Review  Phone: [phone]  Fax: [phone]  Email: [email or web address]     DISCHARGE SUPPORT TEAM:  For Patients Discharge Needs & Updates  Phone: [phone] opt. 2 Fax: [phone]  Email: [email or web address]

## 2024-04-29 ENCOUNTER — TRANSITIONAL CARE MANAGEMENT (OUTPATIENT)
Dept: FAMILY MEDICINE CLINIC | Facility: CLINIC | Age: 89
End: 2024-04-29

## 2024-04-29 DIAGNOSIS — Z71.89 COORDINATION OF COMPLEX CARE: Primary | ICD-10-CM

## 2024-04-30 ENCOUNTER — APPOINTMENT (OUTPATIENT)
Dept: PULMONOLOGY | Facility: CLINIC | Age: 89
End: 2024-04-30
Payer: MEDICARE

## 2024-04-30 ENCOUNTER — PATIENT OUTREACH (OUTPATIENT)
Dept: FAMILY MEDICINE CLINIC | Facility: CLINIC | Age: 89
End: 2024-04-30

## 2024-04-30 LAB
BACTERIA BLD CULT: NORMAL
BACTERIA BLD CULT: NORMAL

## 2024-04-30 NOTE — PROGRESS NOTES
Pulmonary Rehabilitation Plan of Care   90 DAY      Today's date: 2024   # of Exercise Sessions Completed: 20  Patient name: Severiano Feliciano      : 1935  Age: 88 y.o.       MRN: [id number]  Referring Physician: Buddy Hayward MD  Pulmonologist: Buddy Hayward MD    Provider: [place]  Clinician: Erin Gabriel MS, CEP    Dx:    Encounter Diagnosis   Name Primary?    Chronic obstructive pulmonary disease, unspecified COPD type (HCC) Yes     Date of onset: 2024      SUMMARY OF PROGRESS: This is Severiano's 90 day note in pulmonary rehab. He has completed 20 sessions so far and was scheduled to complete 24 total. However, Severiano was hospitalized - for his COPD and decreased SpO2. He is scheduled to see his PCP and Pulmonologist tomorrow and will plan to return to pulmonary exercise next week . He will plan to complete 36 sessions due to his recent set back. Communication continues to be difficult with Severiano, not only with the language barrier but also due to severe hearing loss and he often does not wear his hearing aids. Severiano tolerates 40-45 mins of exercise at 2.3-2.6 METs on 3LO2. O2 is occ increased to 4L at rest, because SpO2 ranges from 85-89% right after walking in to rehab. Normal resting with normal increase to exercise. Occ elevated resting BP Resting //92. His family reports him being able to get around the house more easily and he has not been using his cane. He was encouraged to increase walking at home and encourage him to complete simple tasks on his own but with supervision. Severiano has lost 6 lbs ,without intention, since his initial evaluation and his family is in charge of his meals at home. No concern of anxiety or depression at this time. Severiano will continue to be progressed over the next 30 days to reach his goals.   Patient specific goals include: become more independent and move more at home.    Medication compliance:  Yes   Comments: Pt reports to be compliant with medications    Fall Risk: High   Comments: Patient uses walking assist device (walker/cane/rollator) and Denies a fall in the past 6 months    Smoking: Former user  Quit 20 years ago    Pulmonary Disease Risk Factors:  Occupational exposure to workplace  fumes    RPD at Rest: 0-1/10  RPD with Exercise:  0-3/10    Assessment of progression of lung disease and functional status:  CAT: not assessed  Shortness of breath questionnaire: 54/120      EXERCISE ASSESSMENT and PLAN    Current Exercise Program in Rehab:       Frequency: 2 days/week   Supplement with home exercise 2+ days/wk as tolerated        Minutes: 40-45         METS: 2.3-2.6              SpO2: 89-95%              RPD: 0-3                      HR: 72-95   RPE: 4-5         Modalities: UBE, NuStep, Recumbent bike, and Room walking      Exercise Progression 30 Day Goals :    Frequency: 2 days/week    Supplement with home exercise 2+ days/wk as tolerated       Minutes: 40-45        METS: 2.5-3.2              SpO2: >88%              RPD: 4-6                      HR:    RPE: 4-5        Modalities: UBE, NuStep, Recumbent bike, and Room walking     Strength trainin-3 days / week  12-15 repetitions  1-2 sets per modality    Modalities: Sit to Stands, Calf Raises, and leg extensions    Home Exercise: none    Education: pursed lipped breathing, fall prevention while using nasal canula tubing, relaxation breathing, benefits of exercise for pulmonary disease, RPD scale, O2 saturation monitoring, and appropriate O2 response to exercise    SMART Goals:   improved 6MWT distance, reduced dyspnea during exercise, improved exercise tolerance based on peak METs tolerated in pulmonary rehab exercise session, and SpO2 >88% during exercise    Patient Specific EXERCISE GOALS:   (home exercise, ADLs): reduced dependence on supplemental O2, attend pulmonary rehab regularly, decrease sitting time at home, start a walking  program, reduced pulmonary related hospital readmissions , increased muscular strength, increased energy, increased stamina with ADLs, and more independence at home    Patient's progress toward SMART and personal goals: Was attending regularly 2x/week. Hospitalized 4/25-4/27, will return next week 5/7 after f/u with PCP and Pulmonologist    Patient's goals for next 30 days: return to pulmonary rehab exercise 2x/week with adjusted durations and intensities    Plan: Titrate supplemental oxygen as needed to maintain SpO2>88% with exercise, reduce time sitting at home, and utilize PLB with physical activity    Readiness to change: Action:  (Changing behavior)      NUTRITION ASSESSMENT AND PLAN    Weight control:    Starting weight: 137 lbs   Current weight: 131.6 lbs    Diabetes: N/A    SMART Goals:   eat 6 or more servings of grain products per day, eat 5 or more servings of fruits and vegetables a day, rarely eat processed meats or eat low fat processed meats, Do not eat fried foods, choose healthy snacks such as fruit, pretzels, and low fat crackers, choose healthy desserts and sweets such as patrice food cake or  fruit, choose low sugar desserts and sweets, and drink less than 8oz of soda and sweetened drinks per day      Patient Specific NUTRITION GOALS:  (wt control, diabetes management, dietary modifications): improve hydration increased muscle mass    Patient's progress toward SMART and personal goals: Patient has lost 6 lbs without intention    Patient's goals for next 30 days: reduce sweets and increase healthy fats for healthy weight gain and muscle mass    Measurable goals were based Rate Your Plate Dietary Self-Assessment. These are the areas in which the patient could score higher on the assessment.  Goals include recommendations for a heart healthy diet based on American Heart Association.    Education: heart healthy eating principles    Plan: group Class: Heart Healthy Eating, replace refined flours with  whole grains, increase daily intake of fruits and vegetables, increase daily intake of low fat dairy, eat healthy snacks like fruit, pretzels, and low fat crackers, and choose desserts such as fruit, patrice food cake, low-fat or fat-free sweets    Readiness to change: Preparation:  (Getting ready to change)       PSYCHOSOCIAL ASSESSMENT AND PLAN    Emotional:  Depression assessment:  PHQ-9 = 9  5-9 = Mild Depression            Anxiety measure:  LILA-7 = 6  5-9 = Mild anxiety    Assessment of depression and anxiety    Patient's son denies anxiety and depression on patient's behalf    Self-reported stress level:  2  Stress Management: Practice Relaxation Techniques, Exercise, and Enjoy a hobby    Patient's rating of Social support: Very Good   Social Support Network: children, grandchildren, and spouse    Psychosocial Assessment as it relates to rehabilitation: Patient denies issues with his/her family or home life that may affect their rehabilitation efforts.       SMART Goals:    PHQ-9 - reduced severity by one level, Physical Fitness in [facility] Score < 3, Daily Activity in [place] Score < 3, Pain in [place] Score < 3, Overall Health in [facility] Score < 3, Quality of Life in [facility] Score < 3 , Change in Health in [facility] Score < 3 ,  Increased interest and pleasure in doing things, and feel less tired with more energy    Patient Specific PSYCHOCOSOCIAL GOALS:  (stress, emotional well being, social support): spend time with family    Patient's progress toward SMART and personal goals: attends pulmonary rehab 2x/week and enjoys coming    Patient's goals for next 30 days: Be more active and increase energy level in return      Education: benefits of a positive support system, stress management techniques, and depression and pulmonary disease    Plan: Exercise, Reduce dependence  on family, Return to previous social activity, and Avoid triggers    Readiness to change: Action:  (Changing behavior)      OTHER  CORE COMPONENTS     Tobacco:   Social History     Tobacco Use   Smoking Status Former    Current packs/day: 0.00    Average packs/day: 1 pack/day for 35.0 years (35.0 ttl pk-yrs)    Types: Cigarettes    Start date:     Quit date:     Years since quittin.3   Smokeless Tobacco Never       Tobacco Use Intervention: Referral to tobacco expert:   Pt quit 20 years ago   and has abstained    Blood pressure:    Restin//90    Oxygen needs:   Rest:  supplemental O2 via nasal cannula @ 3L/min   Exercise/physical activity:  supplemental O2 via nasal cannula @ 3-4L/min   Sleep:   supplemental O2 via nasal cannula @ 3L/min     Oxygen Goal: Maintain SpO2>88% during exercise    SMART Goals: reduced dietary sodium <2000mg and medication compliance    Patient Specific CORE COMPONENT GOALS (smoking, BP control, angina control, medication compliance): Take inhalers properly to ensure he is receiving the medication    Patient's progress toward SMART and personal goals: Occ elevated resting BP; SpO2 often drops, especially when jsut arriving to rehab- increase O2 to 4L.    Patient's goals for next 30 days: Continue to utilize supplemental O2 at 3L with exercise and increase to 4L to maintain SpO2 >88%; ensure 100% medication compliance.     Education:  pathophysiology of pulmonary disease, inspiratory muscle training, and education: Pulmonary Anatomy and Physiology    Plan: medication compliance, engage in regular exercise, and monitor home BP    Readiness to change: Action:  (Changing behavior)

## 2024-04-30 NOTE — PROGRESS NOTES
Contacted patient's son Daljit for f/u post hospitalization for acute on chronic respiratory failure with hypoxia and hypercapnia.  Patient lives with son who is his paid caregiver through waiver services.  Son states patient is doing much better since discharge.  He is on oxygen at 3 L, SpO2 94%.  He denies wheezing or sob but does endorse productive cough.  Patient is using nebulizer treatments and inhalers as prescribed.  Patient is using CPAP at night but son states patient would prefer nasal pillows to mask.  He will contact oxygen company to discuss.  Nutritional intake adequate.  Reviewed medications and he is taking all as prescribed.  Follow up appointments scheduled.  Son transports to appointments.  No needs identified at this time.

## 2024-05-02 ENCOUNTER — APPOINTMENT (OUTPATIENT)
Dept: PULMONOLOGY | Facility: CLINIC | Age: 89
End: 2024-05-02
Payer: MEDICARE

## 2024-05-02 ENCOUNTER — TELEPHONE (OUTPATIENT)
Dept: FAMILY MEDICINE CLINIC | Facility: CLINIC | Age: 89
End: 2024-05-02

## 2024-05-02 NOTE — UTILIZATION REVIEW
NOTIFICATION OF ADMISSION DISCHARGE   This is a Notification of Discharge from [facility]. Please be advised that this patient has been discharge from our facility. Below you will find the admission and discharge date and time including the patient’s disposition.   UTILIZATION REVIEW CONTACT:  Cat Loredo MA  Utilization   Network Utilization Review Department  Phone: [phone] x carefully listen to the prompts. All voicemails are confidential.  Email: [email or web address]     ADMISSION INFORMATION  PRESENTATION DATE: 4/25/2024  6:18 AM  OBERVATION ADMISSION DATE:   INPATIENT ADMISSION DATE: 4/25/24 10:22 AM   DISCHARGE DATE: 4/27/2024  3:24 PM   DISPOSITION:Home/Self Care    Network Utilization Review Department  ATTENTION: Please call with any questions or concerns to [phone] and carefully listen to the prompts so that you are directed to the right person. All voicemails are confidential.   For Discharge needs, contact Care Management DC Support Team at [phone] opt. 2  Send all requests for admission clinical reviews, approved or denied determinations and any other requests to dedicated fax number below belonging to the campus where the patient is receiving treatment. List of dedicated fax numbers for the Facilities:  FACILITY NAME UR FAX NUMBER   ADMISSION DENIALS (Administrative/Medical Necessity) [phone]   DISCHARGE SUPPORT TEAM ([facility]) [phone]   PARENT CHILD HEALTH (Maternity/NICU/Pediatrics) [phone]   [facility] [phone]   [facility] [phone]   [facility] [phone]   [facility] [phone]   [facility] [phone]   [facility] [phone]   [facility] [phone]   [facility]  [phone]   [facility] [phone]   [facility] [phone]   [facility] [phone]   [facility] [phone]

## 2024-05-03 ENCOUNTER — OFFICE VISIT (OUTPATIENT)
Dept: PULMONOLOGY | Facility: CLINIC | Age: 89
End: 2024-05-03
Payer: MEDICARE

## 2024-05-03 ENCOUNTER — DOCUMENTATION (OUTPATIENT)
Dept: PULMONOLOGY | Facility: CLINIC | Age: 89
End: 2024-05-03

## 2024-05-03 VITALS
SYSTOLIC BLOOD PRESSURE: 142 MMHG | OXYGEN SATURATION: 93 % | HEART RATE: 77 BPM | WEIGHT: 135.8 LBS | DIASTOLIC BLOOD PRESSURE: 82 MMHG | BODY MASS INDEX: 24.06 KG/M2 | HEIGHT: 63 IN

## 2024-05-03 DIAGNOSIS — J96.12 CHRONIC RESPIRATORY FAILURE WITH HYPOXIA AND HYPERCAPNIA (HCC): ICD-10-CM

## 2024-05-03 DIAGNOSIS — J44.9 CHRONIC OBSTRUCTIVE PULMONARY DISEASE, UNSPECIFIED COPD TYPE (HCC): Primary | ICD-10-CM

## 2024-05-03 DIAGNOSIS — J96.11 CHRONIC RESPIRATORY FAILURE WITH HYPOXIA AND HYPERCAPNIA (HCC): ICD-10-CM

## 2024-05-03 PROCEDURE — 94618 PULMONARY STRESS TESTING: CPT | Performed by: INTERNAL MEDICINE

## 2024-05-03 PROCEDURE — 99214 OFFICE O/P EST MOD 30 MIN: CPT | Performed by: INTERNAL MEDICINE

## 2024-05-03 RX ORDER — FORMOTEROL FUMARATE DIHYDRATE 20 UG/2ML
20 SOLUTION RESPIRATORY (INHALATION) 2 TIMES DAILY
Qty: 360 ML | Refills: 3 | Status: SHIPPED | OUTPATIENT
Start: 2024-05-03 | End: 2025-04-28

## 2024-05-03 RX ORDER — IPRATROPIUM BROMIDE AND ALBUTEROL SULFATE 2.5; .5 MG/3ML; MG/3ML
3 SOLUTION RESPIRATORY (INHALATION) 2 TIMES DAILY
Qty: 540 ML | Refills: 3 | Status: SHIPPED | OUTPATIENT
Start: 2024-05-03 | End: 2025-04-28

## 2024-05-03 RX ORDER — BUDESONIDE 0.5 MG/2ML
0.5 INHALANT ORAL 2 TIMES DAILY
Qty: 360 ML | Refills: 3 | Status: SHIPPED | OUTPATIENT
Start: 2024-05-03 | End: 2025-04-28

## 2024-05-03 NOTE — PROGRESS NOTES
Pulmonary Outpatient Note   Severiano Feliciano 88 y.o. male MRN: [id number]  5/3/2024        Reason for Consultation:    Chief Complaint   Patient presents with    COPD         Assessment/Plan:    1. Chronic obstructive pulmonary disease, unspecified COPD type (HCC)  Assessment & Plan:  GOLD E  Multiple admissions for exacerbations  Patient is elderly and frail  Discussed role of palliative care referral again-declined. I provided paperwork in [place] to discuss [facility] and told him and family they can present to me or primary care physician     Plan  Continue Budesonide BID and Duonebs TID with PRN albuterol in between as needed  Continue supplemental oxygen and nocturnal NIPPV  Pulm rehab- resume  Annual influenza vaccine    RTC  3 months    Orders:  -     Ambulatory Referral to Pulmonary Rehabilitation; Future  -     budesonide (Pulmicort) 0.5 mg/2 mL nebulizer solution; Take 2 mL (0.5 mg total) by nebulization 2 (two) times a day Rinse mouth after use.  -     formoterol (PERFOROMIST) 20 MCG/2ML nebulizer solution; Take 2 mL (20 mcg total) by nebulization 2 (two) times a day  -     ipratropium-albuterol (DUO-NEB) 0.5-2.5 mg/3 mL nebulizer solution; Take 3 mL by nebulization 2 (two) times a day  -     Home Oxygen with Portability  -     PAP DME Resupply/Reorder    2. Chronic respiratory failure with hypoxia and hypercapnia (HCC)  Assessment & Plan:  On O2 titration study today 5/4/24- no O2 needed at rest, 3 lpm with exertion  Reports issue with portable oxygen concentrator being broken. Has tanks at home    Plan:  Continue supplemental oxygen with ambulation- 3 lpm.    Continue nocturnal NIPPV  Re-order POC/oxygen to DME company  Order BiPAP supplies to DME    Orders:  -     POCT Oxygen Titration  -     Home Oxygen with Portability            Health Maintenance  Immunization History   Administered Date(s) Administered    COVID-19 PFIZER VACCINE 0.3 ML IM 05/07/2021, 06/01/2021    Pneumococcal Conjugate  13-Valent 08/24/2016    Pneumococcal Polysaccharide PPV23 11/16/2017, 08/02/2018          Return in about 3 months (around 8/3/2024).    History of Present Illness   HPI:  Severiano Feliciano is a 88 y.o. male who presents for follow-up for COPD and chronic hypoxemic/hypercapnic respiratory failure.    Last seen January- maintained on nebulized regimen  Recently hospitalized at [facility] for 2 days for COPD exacerbation discharged on prednisone which was completed.  Using 3 lpm of oxygen at all times.    He has some dyspnea on exertion- with minimal activity.  Minimal cough or wheeze currently. Back to baseline.    Wants to go back to pulmonary rehab.  Requesting BiPAP supplies  [employer] for DME          Review of Systems   Constitutional:  Negative for chills and fever.   HENT:  Negative for ear pain and sore throat.    Eyes:  Negative for pain and visual disturbance.   Respiratory:  Positive for shortness of breath. Negative for cough.    Cardiovascular:  Negative for chest pain and palpitations.   Gastrointestinal:  Negative for abdominal pain and vomiting.   Genitourinary:  Negative for dysuria and hematuria.   Musculoskeletal:  Negative for arthralgias and back pain.   Skin:  Negative for color change and rash.   Neurological:  Negative for seizures and syncope.   All other systems reviewed and are negative.          Historical Information   Past Medical History:   Diagnosis Date    Acute metabolic encephalopathy 06/13/2020    Age-related cataract of right eye 04/04/2023    patient is medically cleared and presents with low risk of complication with this upcoming R cataract surgery.    Anemia     Aneurysm, aorta, thoracic (HCC)     Appendicolith     Ascending aortic aneurysm (HCC)     3.7    Asthma     BPH (benign prostatic hyperplasia)     CAD (coronary artery disease)     noted on CT scan    CHF (congestive heart failure) (HCC)     COPD (chronic obstructive pulmonary disease) (HCC)     Descending  thoracic aortic aneurysm (HCC)     Diabetes mellitus (HCC)     Diverticulosis     Former tobacco use     GERD (gastroesophageal reflux disease)     History of DVT (deep vein thrombosis)     Left leg    History of transfusion     Hypertension     Hypoxemia 04/30/2023    Inguinal hernia     right    Nephrolithiasis     Oxygen dependent     2LNC    Oxygen dependent     Pneumonia     Pre-diabetes     Prostate calculus     PVD (peripheral vascular disease) (HCC)     Recurrent right inguinal hernia w incarcertion 01/04/2023    Thoracic aortic aneurysm without rupture (HCC) 11/19/2018    Added automatically from request for surgery [id number]    Thrombocytopenia (HCC) 12/29/2018    Ulcer     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)     Varicose vein of leg     b/l     Past Surgical History:   Procedure Laterality Date    CARDIAC SURGERY      ESOPHAGOGASTRODUODENOSCOPY      HERNIA REPAIR Right 1/21/2019    Procedure: REPAIR HERNIA INGUINAL WITH MESH;  Surgeon: David Lowry MD;  Location:  MAIN OR;  Service: General    HERNIA REPAIR Right 7/22/2023    Procedure: REPAIR RECURRENT INCARERATED HERNIA INGUINAL OPEN, RIGHT ORCHIECTOMY;  Surgeon: Mason Bertrand MD;  Location: [facility];  Service: General    INGUINAL HERNIA REPAIR Bilateral     IR TEVAR  12/27/2018    WY EVASC RPR DTA COVERAGE ART ORIGIN 1ST ENDOPROSTH N/A 12/27/2018    Procedure: TEVAR - endovascular thoracic aortic aneurysm repair;  Surgeon: Gladis Ortega MD;  Location: [facility];  Service: Vascular    THORACIC AORTIC ANEURYSM REPAIR  12/27/2018    VARICOSE VEIN SURGERY Bilateral     vein stripping     Family History   Problem Relation Age of Onset    Tuberculosis Mother     No Known Problems Father     Cancer Sister     Diabetes Family     Hypertension Family        Meds/Allergies     Current Outpatient Medications:     acetaminophen (TYLENOL) 650 mg CR tablet, Take 1 tablet (650 mg total) by mouth every 8 (eight) hours as needed for mild  "pain, Disp: 90 tablet, Rfl: 5    albuterol (PROVENTIL HFA,VENTOLIN HFA) 90 mcg/act inhaler, Inhale 2 puffs every 6 (six) hours as needed for wheezing, Disp: , Rfl:     brimonidine tartrate 0.2 % ophthalmic solution, INSTILL 1 DROP IN LEFT EYE TWICE DAILY INSTILL 1 DROP IN LEFT EYE DOS VECES AL EULALIO, Disp: , Rfl:     budesonide (Pulmicort) 0.5 mg/2 mL nebulizer solution, Take 2 mL (0.5 mg total) by nebulization 2 (two) times a day Rinse mouth after use., Disp: 360 mL, Rfl: 3    formoterol (PERFOROMIST) 20 MCG/2ML nebulizer solution, Take 2 mL (20 mcg total) by nebulization 2 (two) times a day, Disp: 360 mL, Rfl: 3    ipratropium-albuterol (DUO-NEB) 0.5-2.5 mg/3 mL nebulizer solution, Take 3 mL by nebulization 2 (two) times a day, Disp: 540 mL, Rfl: 3    losartan (COZAAR) 25 mg tablet, TAKE ONE TABLET BY MOUTH TWICE DAILYTOMAR 1 TABLETA POR VIA ORAL DOS VECES AL EULALIO, Disp: 180 tablet, Rfl: 0    mometasone (ELOCON) 0.1 % lotion, Apply topically daily, Disp: 60 mL, Rfl: 5    pantoprazole (PROTONIX) 40 mg tablet, Take 1 tablet (40 mg total) by mouth daily, Disp: 30 tablet, Rfl: 5    senna-docusate sodium (SENOKOT S) 8.6-50 mg per tablet, Take 1 tablet by mouth daily, Disp: 30 tablet, Rfl: 5    ciprofloxacin (CILOXAN) 0.3 % ophthalmic solution, 3 drops both ears qhs for 3 days (Patient not taking: Reported on 5/3/2024), Disp: 5 mL, Rfl: 1  Allergies   Allergen Reactions    Penicillins Hives, Itching and Rash    Lisinopril Rash     Side pains and rash     Zyprexa [Olanzapine] Tongue Swelling       Vitals: Blood pressure 142/82, pulse 77, height 5' 3\" (1.6 m), weight 61.6 kg (135 lb 12.8 oz), SpO2 93%. Body mass index is 24.06 kg/m². Oxygen Therapy  SpO2: 93 %  Oxygen Therapy: Supplemental oxygen  O2 Delivery Method: Nasal cannula  O2 Flow Rate (L/min): 3 L/min      Physical Exam  Physical Exam  Vitals and nursing note reviewed.   Constitutional:       General: He is not in acute distress.     Appearance: He is " well-developed.      Comments: Elderly man who appears chronically ill in no distress   HENT:      Head: Normocephalic and atraumatic.   Eyes:      Conjunctiva/sclera: Conjunctivae normal.   Cardiovascular:      Rate and Rhythm: Normal rate and regular rhythm.      Heart sounds: No murmur heard.  Pulmonary:      Effort: Pulmonary effort is normal. No respiratory distress.      Comments: Distant breath sounds  Abdominal:      Palpations: Abdomen is soft.      Tenderness: There is no abdominal tenderness.   Musculoskeletal:         General: No swelling.      Cervical back: Neck supple.      Right lower leg: No edema.      Left lower leg: No edema.   Skin:     General: Skin is warm and dry.      Capillary Refill: Capillary refill takes less than 2 seconds.   Neurological:      Mental Status: He is alert.   Psychiatric:         Mood and Affect: Mood normal.         Labs:   I have personally reviewed pertinent lab results.    ABG:   Lab Results   Component Value Date    PHART 7.354 04/25/2024    EWI4MPP 73.5 (HH) 04/25/2024    PO2ART 73.6 (L) 04/25/2024    KXZ6HCC 40.0 (H) 04/25/2024    BEART 11.2 04/25/2024    SOURCE Radial, Left 04/25/2024   ,   BNP:   Lab Results   Component Value Date     (H) 04/25/2024   ,   CBC:  Lab Results   Component Value Date    WBC 7.18 04/25/2024    HGB 11.6 (L) 04/25/2024    HCT 38.3 04/25/2024     (H) 04/25/2024     (L) 04/25/2024    EOSPCT 7 (H) 04/25/2024    EOSABS 0.53 04/25/2024    NEUTOPHILPCT 72 04/25/2024    LYMPHOPCT 14 04/25/2024   ,   CMP:   Lab Results   Component Value Date    SODIUM 146 04/27/2024    K 4.0 04/27/2024    CL 98 04/27/2024    CO2 41 (H) 04/27/2024    ANIONGAP 0 (L) 03/01/2014    BUN 30 (H) 04/27/2024    CREATININE 0.86 04/27/2024    GLUCOSE 136 12/27/2018    CALCIUM 9.1 04/27/2024    AST 16 04/25/2024    ALT 10 04/25/2024    ALKPHOS 61 04/25/2024    EGFR 78 04/27/2024   ,   PT/INR:   Lab Results   Component Value Date    PT 13.3 05/28/2020     INR 0.95 04/25/2024   ,   Troponin:   Lab Results   Component Value Date    TROPONINI 0.02 08/05/2021         Imaging and other studies: I have personally reviewed pertinent reports.   and I have personally reviewed pertinent films in PACS    CXR 4/2024  There is emphysema. There is persistent right basilar atelectasis or scarring and mild asymmetric prominence of the lung markings on the right versus the left which seems stable. No significant new abnormality appreciated     CTA Chest 1/29/24  Upper lobe predominant emphysema  No PE    Pulmonary function testing:       POCT Oxygen Titration Study 5/3/24    Resting SPO2 on room air 95%  Resting heart rate 70.  Patient ambulated for 5 total minutes.  SPO2 on room air was 84% and on 2 lpm was 88% with ambulation.  On 3 lpm with ambulation SPO2 was 92%.  Heart rate with ambulation was 78%.    Interpretation: At rest no oxygen required. With ambulation patient requires 3 lpm of supplemental oxygen to maintain SPO2 >88%.    PFT 3/2024  Spirometry:  FEV1/FVC Ratio is 42%.  FEV1 is 0.54 L, 25% predicted.  FVC is 1.29 L, 44% predicted.     Flow volume loop:  Severely obstructive appearance     ABG on 3 L nasal cannula:  pH: 7.32  pCO2: 73 mmHg  pO2 :56 mmHg  HCO3: 37.4      IMPRESSION:  Very severe obstructive ventilatory flow limitation with severely obstructed flow-volume loop  ABG on 3 L consistent with acute on chronic respiratory acidosis with hypoxemia    EKG, Pathology, and Other Studies: I have personally reviewed pertinent reports.     TTE 4/2023    Left Ventricle: Left ventricular cavity size is normal. Wall thickness is moderately increased. Systolic function is normal. Wall motion is normal. Diastolic function is mildly abnormal, consistent with grade I (abnormal) relaxation.    Tricuspid Valve: There is mild regurgitation. The right ventricular systolic pressure is mildly elevated. The estimated right ventricular systolic pressure is 39.00 mmHg.    Buddy  OLI [place].  [facility]

## 2024-05-03 NOTE — ASSESSMENT & PLAN NOTE
AUGUSTO GUILLEN  Multiple admissions for exacerbations  Patient is elderly and frail  Discussed role of palliative care referral again-declined. I provided paperwork in [place] to discuss [facility] and told him and family they can present to me or primary care physician     Plan  Continue Budesonide BID and Duonebs TID with PRN albuterol in between as needed  Continue supplemental oxygen and nocturnal NIPPV  Pulm rehab- resume  Annual influenza vaccine    RTC  3 months

## 2024-05-03 NOTE — ASSESSMENT & PLAN NOTE
On O2 titration study today 5/4/24- no O2 needed at rest, 3 lpm with exertion  Reports issue with portable oxygen concentrator being broken. Has tanks at home    Plan:  Continue supplemental oxygen with ambulation- 3 lpm.    Continue nocturnal NIPPV  Re-order POC/oxygen to DME company  Order BiPAP supplies to DME

## 2024-05-06 LAB

## 2024-05-08 LAB

## 2024-05-09 ENCOUNTER — OFFICE VISIT (OUTPATIENT)
Dept: FAMILY MEDICINE CLINIC | Facility: CLINIC | Age: 89
End: 2024-05-09
Payer: MEDICARE

## 2024-05-09 ENCOUNTER — CLINICAL SUPPORT (OUTPATIENT)
Dept: PULMONOLOGY | Facility: CLINIC | Age: 89
End: 2024-05-09
Payer: MEDICARE

## 2024-05-09 VITALS
HEART RATE: 80 BPM | BODY MASS INDEX: 24.27 KG/M2 | RESPIRATION RATE: 20 BRPM | OXYGEN SATURATION: 93 % | TEMPERATURE: 98.1 F | HEIGHT: 63 IN | WEIGHT: 137 LBS | SYSTOLIC BLOOD PRESSURE: 118 MMHG | DIASTOLIC BLOOD PRESSURE: 70 MMHG

## 2024-05-09 DIAGNOSIS — J44.9 CHRONIC OBSTRUCTIVE PULMONARY DISEASE, UNSPECIFIED COPD TYPE (HCC): Primary | ICD-10-CM

## 2024-05-09 DIAGNOSIS — Z99.81 SUPPLEMENTAL OXYGEN DEPENDENT: ICD-10-CM

## 2024-05-09 DIAGNOSIS — J44.9 CHRONIC OBSTRUCTIVE PULMONARY DISEASE, UNSPECIFIED COPD TYPE (HCC): ICD-10-CM

## 2024-05-09 DIAGNOSIS — Z76.89 ENCOUNTER FOR SUPPORT AND COORDINATION OF TRANSITION OF CARE: Primary | ICD-10-CM

## 2024-05-09 PROCEDURE — 99495 TRANSJ CARE MGMT MOD F2F 14D: CPT | Performed by: FAMILY MEDICINE

## 2024-05-09 PROCEDURE — G0239 OTH RESP PROC, GROUP: HCPCS

## 2024-05-09 NOTE — PROGRESS NOTES
"Name: Severiano Feliciano      : 1935      MRN: [id number]  Encounter Provider: Kirby Casanova MD  Encounter Date: 2024   Encounter department: [facility]    Subjective     Subjective  Patient presents for transition of care management (TCM) following a recent hospitalization for an acute exacerbation of chronic obstructive pulmonary disease (COPD). Patient reports feeling better with no current symptoms or post-hospital issues. Patient inquires about the necessity of oxygen therapy. No new falls or need for medication management assistance reported. Patient has been compliant with obtaining prescribed medications and does not require transportation assistance for appointments.   Vital Signs  Blood Pressure: 118/70 mmHg (Left arm, Sitting, Standard cuff), Pulse: 80 bpm, Temperature: 98.1 °F (Tympanic), Respiratory Rate: 20 breaths/min, Height: 5' 3\" (1.6 m), Weight: 62.1 kg (137 lb), SpO2: 93% on 3 liters of oxygen, BMI: 24.27 kg/m².    Physical Exam  Examination reveals poor air entry, patient is on oxygen via nasal cannula. The remainder of the physical exam is within normal limits.    Lab Results  No new lab results to report at this time.    Imaging and Other Relevant Results  No new imaging or relevant test results to report at this time.      Assessment and Plan     1. Encounter for support and coordination of transition of care    2. Chronic obstructive pulmonary disease, unspecified COPD type (HCC)    3. Supplemental oxygen dependent        1. Chronic obstructive pulmonary disease, unspecified COPD type: Patient is recovering from a recent acute exacerbation. Continue current medication regimen and monitor for any signs of worsening symptoms. Reinforce the importance of medication adherence and proper inhaler technique.  2. Oxygen therapy: Patient is currently on 3 liters of oxygen via nasal cannula with SpO2 at 93%. Continue oxygen therapy and titrate as " needed based on symptoms and oxygen saturation levels. Educate patient on the importance of oxygen therapy in managing COPD and preventing hypoxia.  3. Follow-up with pulmonologist: Ensure patient has a scheduled appointment with their pulmonologist for ongoing COPD management and evaluation of oxygen needs.  4. Medication review: Confirmed patient has obtained all prescribed medications and understands dosing instructions. No changes to the medication regimen at this time.  5. Safety and support at home: Patient has stable living arrangements with family and has not experienced any recent falls. No additional home support services required at this time.  6. Transition of care: Continue to support the patient in the transition from hospital to home care, ensuring all necessary follow-ups and care coordination are in place.      Kirby Casanova MD   [facility]

## 2024-05-09 NOTE — LETTER
May 9, 2024     Patient: Severiano Feliciano  YOB: 1935  Date of Visit: 5/9/2024      To Whom it May Concern:    Severiano Feliciano is under my professional care. Severiano was seen in my office on 5/9/2024.      He is travelling on June 4th 2024.    Please be aware that Severino uses a portable oxygen and needs to be with him in the airplane cabin.    If you have any questions or concerns, please don't hesitate to call.          Sincerely,          Kirby Casanova MD

## 2024-05-12 LAB

## 2024-05-14 ENCOUNTER — CLINICAL SUPPORT (OUTPATIENT)
Dept: PULMONOLOGY | Facility: CLINIC | Age: 89
End: 2024-05-14
Payer: MEDICARE

## 2024-05-14 DIAGNOSIS — J44.9 CHRONIC OBSTRUCTIVE PULMONARY DISEASE, UNSPECIFIED COPD TYPE (HCC): Primary | ICD-10-CM

## 2024-05-14 PROCEDURE — G0239 OTH RESP PROC, GROUP: HCPCS

## 2024-05-16 ENCOUNTER — CLINICAL SUPPORT (OUTPATIENT)
Dept: PULMONOLOGY | Facility: CLINIC | Age: 89
End: 2024-05-16
Payer: MEDICARE

## 2024-05-16 DIAGNOSIS — J44.9 CHRONIC OBSTRUCTIVE PULMONARY DISEASE, UNSPECIFIED COPD TYPE (HCC): Primary | ICD-10-CM

## 2024-05-16 PROCEDURE — G0239 OTH RESP PROC, GROUP: HCPCS

## 2024-05-18 ENCOUNTER — APPOINTMENT (EMERGENCY)
Dept: RADIOLOGY | Facility: HOSPITAL | Age: 89
DRG: 189 | End: 2024-05-18
Payer: MEDICARE

## 2024-05-18 ENCOUNTER — HOSPITAL ENCOUNTER (EMERGENCY)
Facility: HOSPITAL | Age: 89
Discharge: HOME | DRG: 189 | End: 2024-05-18
Attending: EMERGENCY MEDICINE | Admitting: EMERGENCY MEDICINE
Payer: MEDICARE

## 2024-05-18 VITALS
TEMPERATURE: 98.5 F | SYSTOLIC BLOOD PRESSURE: 151 MMHG | DIASTOLIC BLOOD PRESSURE: 80 MMHG | RESPIRATION RATE: 20 BRPM | HEART RATE: 79 BPM | OXYGEN SATURATION: 98 % | BODY MASS INDEX: 23.88 KG/M2 | WEIGHT: 134.8 LBS

## 2024-05-18 DIAGNOSIS — J44.9 COPD (CHRONIC OBSTRUCTIVE PULMONARY DISEASE) (HCC): ICD-10-CM

## 2024-05-18 DIAGNOSIS — R53.1 GENERALIZED WEAKNESS: Primary | ICD-10-CM

## 2024-05-18 DIAGNOSIS — R73.9 HYPERGLYCEMIA: ICD-10-CM

## 2024-05-18 LAB
ALBUMIN SERPL BCP-MCNC: 3.6 G/DL (ref 3.5–5)
ALP SERPL-CCNC: 66 U/L (ref 34–104)
ALT SERPL W P-5'-P-CCNC: 34 U/L (ref 7–52)
ANION GAP SERPL CALCULATED.3IONS-SCNC: 2 MMOL/L (ref 4–13)
AST SERPL W P-5'-P-CCNC: 38 U/L (ref 13–39)
BASOPHILS # BLD AUTO: 0.02 THOUSANDS/ÂΜL (ref 0–0.1)
BASOPHILS NFR BLD AUTO: 0 % (ref 0–1)
BILIRUB SERPL-MCNC: 0.47 MG/DL (ref 0.2–1)
BUN SERPL-MCNC: 21 MG/DL (ref 5–25)
CALCIUM SERPL-MCNC: 9.1 MG/DL (ref 8.4–10.2)
CHLORIDE SERPL-SCNC: 98 MMOL/L (ref 96–108)
CO2 SERPL-SCNC: 44 MMOL/L (ref 21–32)
CREAT SERPL-MCNC: 0.89 MG/DL (ref 0.6–1.3)
EOSINOPHIL # BLD AUTO: 0.24 THOUSAND/ÂΜL (ref 0–0.61)
EOSINOPHIL NFR BLD AUTO: 3 % (ref 0–6)
ERYTHROCYTE [DISTWIDTH] IN BLOOD BY AUTOMATED COUNT: 13.2 % (ref 11.6–15.1)
FLUAV RNA RESP QL NAA+PROBE: NEGATIVE
FLUBV RNA RESP QL NAA+PROBE: NEGATIVE
GFR SERPL CREATININE-BSD FRML MDRD: 77 ML/MIN/1.73SQ M
GLUCOSE SERPL-MCNC: 183 MG/DL (ref 65–140)
HCT VFR BLD AUTO: 40.5 % (ref 36.5–49.3)
HGB BLD-MCNC: 11.2 G/DL (ref 12–17)
IMM GRANULOCYTES # BLD AUTO: 0.02 THOUSAND/UL (ref 0–0.2)
IMM GRANULOCYTES NFR BLD AUTO: 0 % (ref 0–2)
LYMPHOCYTES # BLD AUTO: 0.92 THOUSANDS/ÂΜL (ref 0.6–4.47)
LYMPHOCYTES NFR BLD AUTO: 12 % (ref 14–44)
MCH RBC QN AUTO: 32 PG (ref 26.8–34.3)
MCHC RBC AUTO-ENTMCNC: 27.7 G/DL (ref 31.4–37.4)
MCV RBC AUTO: 116 FL (ref 82–98)
MONOCYTES # BLD AUTO: 0.86 THOUSAND/ÂΜL (ref 0.17–1.22)
MONOCYTES NFR BLD AUTO: 11 % (ref 4–12)
NEUTROPHILS # BLD AUTO: 5.59 THOUSANDS/ÂΜL (ref 1.85–7.62)
NEUTS SEG NFR BLD AUTO: 74 % (ref 43–75)
NRBC BLD AUTO-RTO: 0 /100 WBCS
PLATELET # BLD AUTO: 128 THOUSANDS/UL (ref 149–390)
PMV BLD AUTO: 9.4 FL (ref 8.9–12.7)
POTASSIUM SERPL-SCNC: 4.4 MMOL/L (ref 3.5–5.3)
PROT SERPL-MCNC: 6.6 G/DL (ref 6.4–8.4)
RBC # BLD AUTO: 3.5 MILLION/UL (ref 3.88–5.62)
RSV RNA RESP QL NAA+PROBE: NEGATIVE
SARS-COV-2 RNA RESP QL NAA+PROBE: NEGATIVE
SODIUM SERPL-SCNC: 144 MMOL/L (ref 135–147)
WBC # BLD AUTO: 7.65 THOUSAND/UL (ref 4.31–10.16)

## 2024-05-18 PROCEDURE — 36415 COLL VENOUS BLD VENIPUNCTURE: CPT

## 2024-05-18 PROCEDURE — 0241U HB NFCT DS VIR RESP RNA 4 TRGT: CPT

## 2024-05-18 PROCEDURE — 99285 EMERGENCY DEPT VISIT HI MDM: CPT

## 2024-05-18 PROCEDURE — 93005 ELECTROCARDIOGRAM TRACING: CPT

## 2024-05-18 PROCEDURE — 96360 HYDRATION IV INFUSION INIT: CPT

## 2024-05-18 PROCEDURE — 71045 X-RAY EXAM CHEST 1 VIEW: CPT

## 2024-05-18 PROCEDURE — 80053 COMPREHEN METABOLIC PANEL: CPT

## 2024-05-18 PROCEDURE — 85025 COMPLETE CBC W/AUTO DIFF WBC: CPT

## 2024-05-18 RX ADMIN — SODIUM CHLORIDE 1000 ML: 0.9 INJECTION, SOLUTION INTRAVENOUS at 19:11

## 2024-05-18 NOTE — ED PROVIDER NOTES
History  Chief Complaint   Patient presents with    Weakness - Generalized     Per wife patient has no desire to do anything and does not seem happy; also has had a cough     Severiano is an 88 year old male with an extensive PMHx presenting to the ED for generalized weakness, and loss of interest per his wife. Notes all he does is lay or sit in place all day, no interest in anything, and generally seems more unhappy than usual. There also seems to be a new cough developing but no further symptoms.      Prior to Admission Medications   Prescriptions Last Dose Informant Patient Reported? Taking?   acetaminophen (TYLENOL) 650 mg CR tablet   No No   Sig: Take 1 tablet (650 mg total) by mouth every 8 (eight) hours as needed for mild pain   albuterol (PROVENTIL HFA,VENTOLIN HFA) 90 mcg/act inhaler   Yes No   Sig: Inhale 2 puffs every 6 (six) hours as needed for wheezing   brimonidine tartrate 0.2 % ophthalmic solution   Yes No   Sig: INSTILL 1 DROP IN LEFT EYE TWICE DAILY INSTILL 1 DROP IN LEFT EYE DOS VECES AL EULALIO   budesonide (Pulmicort) 0.5 mg/2 mL nebulizer solution   No No   Sig: Take 2 mL (0.5 mg total) by nebulization 2 (two) times a day Rinse mouth after use.   ciprofloxacin (CILOXAN) 0.3 % ophthalmic solution   No No   Sig: 3 drops both ears qhs for 3 days   Patient not taking: Reported on 5/3/2024   formoterol (PERFOROMIST) 20 MCG/2ML nebulizer solution   No No   Sig: Take 2 mL (20 mcg total) by nebulization 2 (two) times a day   ipratropium-albuterol (DUO-NEB) 0.5-2.5 mg/3 mL nebulizer solution   No No   Sig: Take 3 mL by nebulization 2 (two) times a day   losartan (COZAAR) 25 mg tablet   No No   Sig: TAKE ONE TABLET BY MOUTH TWICE DAILYTOMAR 1 TABLETA POR VIA ORAL DOS VECES AL EULALIO   mometasone (ELOCON) 0.1 % lotion   No No   Sig: Apply topically daily   pantoprazole (PROTONIX) 40 mg tablet   No No   Sig: Take 1 tablet (40 mg total) by mouth daily   senna-docusate sodium (SENOKOT S) 8.6-50 mg per tablet   No  No   Sig: Take 1 tablet by mouth daily      Facility-Administered Medications: None       Past Medical History:   Diagnosis Date    Acute metabolic encephalopathy 06/13/2020    Age-related cataract of right eye 04/04/2023    patient is medically cleared and presents with low risk of complication with this upcoming R cataract surgery.    Anemia     Aneurysm, aorta, thoracic (HCC)     Appendicolith     Ascending aortic aneurysm (HCC)     3.7    Asthma     BPH (benign prostatic hyperplasia)     CAD (coronary artery disease)     noted on CT scan    CHF (congestive heart failure) (HCC)     COPD (chronic obstructive pulmonary disease) (HCC)     Descending thoracic aortic aneurysm (HCC)     Diabetes mellitus (HCC)     Diverticulosis     Former tobacco use     GERD (gastroesophageal reflux disease)     History of DVT (deep vein thrombosis)     Left leg    History of transfusion     Hypertension     Hypoxemia 04/30/2023    Inguinal hernia     right    Nephrolithiasis     Oxygen dependent     2LNC    Oxygen dependent     Pneumonia     Pre-diabetes     Prostate calculus     PVD (peripheral vascular disease) (HCC)     Recurrent right inguinal hernia w incarcertion 01/04/2023    Thoracic aortic aneurysm without rupture (HCC) 11/19/2018    Added automatically from request for surgery [id number]    Thrombocytopenia (HCC) 12/29/2018    Ulcer     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)     Varicose vein of leg     b/l       Past Surgical History:   Procedure Laterality Date    CARDIAC SURGERY      ESOPHAGOGASTRODUODENOSCOPY      HERNIA REPAIR Right 1/21/2019    Procedure: REPAIR HERNIA INGUINAL WITH MESH;  Surgeon: David Lowry MD;  Location:  MAIN OR;  Service: General    HERNIA REPAIR Right 7/22/2023    Procedure: REPAIR RECURRENT INCARERATED HERNIA INGUINAL OPEN, RIGHT ORCHIECTOMY;  Surgeon: Mason Bertrand MD;  Location: [facility];  Service: General    INGUINAL HERNIA REPAIR Bilateral     IR TEVAR  12/27/2018     MA EVASC RPR DTA COVERAGE ART ORIGIN 1ST ENDOPROSTH N/A 2018    Procedure: TEVAR - endovascular thoracic aortic aneurysm repair;  Surgeon: Gladis Ortega MD;  Location: [facility];  Service: Vascular    THORACIC AORTIC ANEURYSM REPAIR  2018    VARICOSE VEIN SURGERY Bilateral     vein stripping       Family History   Problem Relation Age of Onset    Tuberculosis Mother     No Known Problems Father     Cancer Sister     Diabetes Family     Hypertension Family      I have reviewed and agree with the history as documented.    E-Cigarette/Vaping    E-Cigarette Use Never User      E-Cigarette/Vaping Substances    Nicotine No     THC No     CBD No     Flavoring No     Other No     Unknown No      Social History     Tobacco Use    Smoking status: Former     Current packs/day: 0.00     Average packs/day: 1 pack/day for 35.0 years (35.0 ttl pk-yrs)     Types: Cigarettes     Start date:      Quit date:      Years since quittin.3    Smokeless tobacco: Never   Vaping Use    Vaping status: Never Used   Substance Use Topics    Alcohol use: Never    Drug use: No       Review of Systems   Constitutional:  Positive for activity change and fatigue. Negative for chills and fever.   Respiratory:  Positive for cough. Negative for apnea, choking and stridor.    Cardiovascular:  Negative for chest pain and palpitations.   Gastrointestinal:  Negative for abdominal pain, nausea and vomiting.   Musculoskeletal:  Negative for myalgias, neck pain and neck stiffness.   Skin:  Negative for rash.   Neurological:  Positive for weakness. Negative for seizures, syncope, light-headedness and headaches.       Physical Exam  Physical Exam  Vitals reviewed.   Constitutional:       General: He is not in acute distress.     Appearance: Normal appearance. He is not ill-appearing, toxic-appearing or diaphoretic.   HENT:      Head: Normocephalic and atraumatic.      Right Ear: Tympanic membrane, ear canal and external ear normal.       Left Ear: Tympanic membrane, ear canal and external ear normal.      Nose: Nose normal.      Mouth/Throat:      Mouth: Mucous membranes are moist.      Pharynx: Oropharynx is clear. No oropharyngeal exudate or posterior oropharyngeal erythema.   Eyes:      General: No scleral icterus.        Right eye: No discharge.         Left eye: No discharge.      Extraocular Movements: Extraocular movements intact.      Conjunctiva/sclera: Conjunctivae normal.      Pupils: Pupils are equal, round, and reactive to light.   Cardiovascular:      Rate and Rhythm: Normal rate and regular rhythm.      Pulses: Normal pulses.      Heart sounds: Normal heart sounds.   Pulmonary:      Effort: Pulmonary effort is normal. No respiratory distress.      Breath sounds: Normal breath sounds.   Abdominal:      Palpations: Abdomen is soft.      Tenderness: There is no abdominal tenderness. There is no right CVA tenderness, left CVA tenderness, guarding or rebound.   Musculoskeletal:         General: Normal range of motion.      Cervical back: Normal range of motion and neck supple. No rigidity or tenderness.   Lymphadenopathy:      Cervical: No cervical adenopathy.   Skin:     General: Skin is warm and dry.      Capillary Refill: Capillary refill takes less than 2 seconds.   Neurological:      General: No focal deficit present.      Mental Status: He is alert.      Cranial Nerves: Cranial nerves 2-12 are intact.      Sensory: Sensation is intact.   Psychiatric:         Mood and Affect: Mood normal.         Behavior: Behavior normal.         Vital Signs  ED Triage Vitals [05/18/24 1841]   Temperature Pulse Respirations Blood Pressure SpO2   98.5 °F (36.9 °C) 81 20 158/83 94 %      Temp Source Heart Rate Source Patient Position - Orthostatic VS BP Location FiO2 (%)   Oral Monitor Lying Left arm --      Pain Score       --           Vitals:    05/18/24 1841 05/18/24 2011   BP: 158/83 151/80   Pulse: 81 79   Patient Position - Orthostatic VS:  Lying Lying         Visual Acuity      ED Medications  Medications   sodium chloride 0.9 % bolus 1,000 mL (0 mL Intravenous Stopped 5/18/24 2014)       Diagnostic Studies  Results Reviewed       Procedure Component Value Units Date/Time    FLU/RSV/COVID - if FLU/RSV clinically relevant [[id number]]  (Normal) Collected: 05/18/24 1843    Lab Status: Final result Specimen: Nares from Nose Updated: 05/18/24 1947     SARS-CoV-2 Negative     INFLUENZA A PCR Negative     INFLUENZA B PCR Negative     RSV PCR Negative    Narrative:      FOR PEDIATRIC PATIENTS - copy/paste COVID Guidelines URL to browser: [email or web address]    SARS-CoV-2 assay is a Nucleic Acid Amplification assay intended for the  qualitative detection of nucleic acid from SARS-CoV-2 in nasopharyngeal  swabs. Results are for the presumptive identification of SARS-CoV-2 RNA.    Positive results are indicative of infection with SARS-CoV-2, the virus  causing COVID-19, but do not rule out bacterial infection or co-infection  with other viruses. Laboratories within the [place] and its  territories are required to report all positive results to the appropriate  public health authorities. Negative results do not preclude SARS-CoV-2  infection and should not be used as the sole basis for treatment or other  patient management decisions. Negative results must be combined with  clinical observations, patient history, and epidemiological information.  This test has not been FDA cleared or approved.    This test has been authorized by FDA under an Emergency Use Authorization  (EUA). This test is only authorized for the duration of time the  declaration that circumstances exist justifying the authorization of the  emergency use of an in vitro diagnostic tests for detection of SARS-CoV-2  virus and/or diagnosis of COVID-19 infection under section 564(b)(1) of  the Act, 21 U.S.C. 360bbb-3(b)(1), unless the  authorization is terminated  or revoked sooner. The test has been validated but independent review by FDA  and CLIA is pending.    Test performed using [employer] Gene[employer]pert: This RT-PCR assay targets N2,  a region unique to SARS-CoV-2. A conserved region in the E-gene was chosen  for pan-Sarbecovirus detection which includes SARS-CoV-2.    According to CMS-2020-01-R, this platform meets the definition of high-throughput technology.    Comprehensive metabolic panel [[id number]]  (Abnormal) Collected: 05/18/24 1856    Lab Status: Final result Specimen: Blood from Arm, Right Updated: 05/18/24 1930     Sodium 144 mmol/L      Potassium 4.4 mmol/L      Chloride 98 mmol/L      CO2 44 mmol/L      ANION GAP 2 mmol/L      BUN 21 mg/dL      Creatinine 0.89 mg/dL      Glucose 183 mg/dL      Calcium 9.1 mg/dL      AST 38 U/L      ALT 34 U/L      Alkaline Phosphatase 66 U/L      Total Protein 6.6 g/dL      Albumin 3.6 g/dL      Total Bilirubin 0.47 mg/dL      eGFR 77 ml/min/1.73sq m     Narrative:      National Kidney Disease Foundation guidelines for Chronic Kidney Disease (CKD):     Stage 1 with normal or high GFR (GFR > 90 mL/min/1.73 square meters)    Stage 2 Mild CKD (GFR = 60-89 mL/min/1.73 square meters)    Stage 3A Moderate CKD (GFR = 45-59 mL/min/1.73 square meters)    Stage 3B Moderate CKD (GFR = 30-44 mL/min/1.73 square meters)    Stage 4 Severe CKD (GFR = 15-29 mL/min/1.73 square meters)    Stage 5 End Stage CKD (GFR <15 mL/min/1.73 square meters)  Note: GFR calculation is accurate only with a steady state creatinine    CBC and differential [[id number]]  (Abnormal) Collected: 05/18/24 1856    Lab Status: Final result Specimen: Blood from Arm, Right Updated: 05/18/24 1911     WBC 7.65 Thousand/uL      RBC 3.50 Million/uL      Hemoglobin 11.2 g/dL      Hematocrit 40.5 %       fL      MCH 32.0 pg      MCHC 27.7 g/dL      RDW 13.2 %      MPV 9.4 fL      Platelets 128 Thousands/uL      nRBC 0 /100 WBCs      Segmented %  74 %      Immature Grans % 0 %      Lymphocytes % 12 %      Monocytes % 11 %      Eosinophils Relative 3 %      Basophils Relative 0 %      Absolute Neutrophils 5.59 Thousands/µL      Absolute Immature Grans 0.02 Thousand/uL      Absolute Lymphocytes 0.92 Thousands/µL      Absolute Monocytes 0.86 Thousand/µL      Eosinophils Absolute 0.24 Thousand/µL      Basophils Absolute 0.02 Thousands/µL     UA w Reflex to Microscopic w Reflex to Culture [[id number]]     Lab Status: No result Specimen: Urine, Clean Catch                    XR chest 1 view portable   ED Interpretation by Sandy Hernandez PA-C (05/18 1913)   No acute cardiopulmonary pathology, compared to CXR from April 25.                 Procedures  ECG 12 Lead Documentation Only    Date/Time: 5/18/2024 6:40 PM    Performed by: Sandy Hernandez PA-C  Authorized by: Sandy Hernandez PA-C    Indications / Diagnosis:  Weakness  ECG reviewed by me, the ED Provider: yes    Patient location:  ED  Previous ECG:     Previous ECG:  Compared to current    Similarity:  No change    Comparison to cardiac monitor: Yes    Rate:     ECG rate:  82    ECG rate assessment: normal    Rhythm:     Rhythm: sinus rhythm    QRS:     QRS axis:  Normal    QRS intervals:  Normal  Conduction:     Conduction: normal    ST segments:     ST segments:  Normal  T waves:     T waves: normal             ED Course  ED Course as of 05/18/24 2024   Sat May 18, 2024   1840 ECG 12 lead  Per my interpretation, normal sinus rhythm at 82 BPM.   1911 CBC abnormalities are stable from previous levels.   1913 XR chest 1 view portable  Per my interpretation, no acute cardiopulmonary pathology, compared to CXR from April 25.   1932 Carbon Dioxide(!): 44  Chronic.   1947 SARS-COV-2: Negative   1947 INFLU A PCR: Negative   1947 INFLU B PCR: Negative   1947 RSV PCR: Negative                               SBIRT 22yo+      Flowsheet Row Most Recent Value   Initial Alcohol Screen: US  AUDIT-C     1. How often do you have a drink containing alcohol? 0 Filed at: 05/18/2024 1831   2. How many drinks containing alcohol do you have on a typical day you are drinking?  0 Filed at: 05/18/2024 1831   3a. Male UNDER 65: How often do you have five or more drinks on one occasion? 0 Filed at: 05/18/2024 1831   3b. FEMALE Any Age, or MALE 65+: How often do you have 4 or more drinks on one occassion? 0 Filed at: 05/18/2024 1831   Audit-C Score 0 Filed at: 05/18/2024 1831   ALAN: How many times in the past year have you...    Used an illegal drug or used a prescription medication for non-medical reasons? Never Filed at: 05/18/2024 1831                      Medical Decision Making  Differential diagnosis to include but not limited to: age, pneumonia, viral uri, anemia, electrolyte imbalance    Plan: ECG, CBC, CMP. No chest pain so no troponin indicated at this time.    Results: unremarkable workup aside from chronic conditions. No pneumonia noted on CXR. Hyperglycemia is new.    Discharge Plan: discussed results with the patient's son. Whenever his O2 would dip, he was taking very shallow breaths, responded well and normalized to 97% when taking normal inhales. Hyperglycemia noted, pt's son states he had juice and candy prior to presenting. No hx of DM. Suggested that generalized weakness and feeling fatigued could be due to uncontrolled blood glucose levels, will follow up with PCP. Has a chronic history of CO2 retention, baseline to previous levels. Discussed admission vs. Sending home and patient's son opted to take him home with him and keep an eye on him. Pt stable at time of discharge, vital signs reviewed, questions answered. Strict ER return precautions provided/discussed and were well understood by patient. Patient's vitals, labs and/or imaging results, diagnosis, and treatment plan were discussed with the patient. All new and/or changed medications were discussed - specifically to include route of  "administration, how often to take, when to take, and the pharmacy they were sent to. Strict return precautions as well as close follow up with PCP was discussed with the patient and the patient was agreeable to my recommendations.  Patient verbally acknowledged understanding. All labs, imaging were reviewed and used in the medical decision making process (if ordered).     Portions of this chart may have been written with voice recognition software.  Occasional grammatical errors, wrong word or \"sound a like\" substitutions may have occurred due to software limitations.  Please read carefully and use context to recognize where substitutions have occurred.        Amount and/or Complexity of Data Reviewed  External Data Reviewed: labs, radiology, ECG and notes.  Labs: ordered. Decision-making details documented in ED Course.  Radiology: ordered and independent interpretation performed. Decision-making details documented in ED Course.     Details: Per my interpretation, no acute cardiopulmonary pathology on CXR.  ECG/medicine tests: ordered and independent interpretation performed. Decision-making details documented in ED Course.     Details: Per my interpretation, normal sinus rhythm at 82 bpm.             Disposition  Final diagnoses:   Generalized weakness   COPD (chronic obstructive pulmonary disease) (HCC)   Hyperglycemia     Time reflects when diagnosis was documented in both MDM as applicable and the Disposition within this note       Time User Action Codes Description Comment    5/18/2024  8:12 PM Sandy Hernandez [R53.1] Generalized weakness     5/18/2024  8:12 PM Sandy Hernandez [J44.9] COPD (chronic obstructive pulmonary disease) (HCC)     5/18/2024  8:19 PM Sandy Hernandez [R73.9] Hyperglycemia           ED Disposition       ED Disposition   Discharge    Condition   Stable    Date/Time   Sat May 18, 2024 2012    Comment   Severiano Feliciano discharge to home/self care.               "     Follow-up Information       Follow up With Specialties Details Why Contact Info Additional Information    Kirby Casanova MD Family Medicine Schedule an appointment as soon as possible for a visit  Follow up, As needed [address] [place] [id number]  [phone]       [facility] hospital Emergency Department Emergency Medicine Go to  If symptoms worsen [address] [facility] [id number] [phone] Critical access hospital Emergency Department            Current Discharge Medication List        CONTINUE these medications which have NOT CHANGED    Details   acetaminophen (TYLENOL) 650 mg CR tablet Take 1 tablet (650 mg total) by mouth every 8 (eight) hours as needed for mild pain  Qty: 90 tablet, Refills: 5    Associated Diagnoses: Chronic bilateral low back pain without sciatica      albuterol (PROVENTIL HFA,VENTOLIN HFA) 90 mcg/act inhaler Inhale 2 puffs every 6 (six) hours as needed for wheezing      brimonidine tartrate 0.2 % ophthalmic solution INSTILL 1 DROP IN LEFT EYE TWICE DAILY INSTILL 1 DROP IN LEFT EYE DOS VECES AL EULALIO      budesonide (Pulmicort) 0.5 mg/2 mL nebulizer solution Take 2 mL (0.5 mg total) by nebulization 2 (two) times a day Rinse mouth after use.  Qty: 360 mL, Refills: 3    Associated Diagnoses: Chronic obstructive pulmonary disease, unspecified COPD type (HCC)      ciprofloxacin (CILOXAN) 0.3 % ophthalmic solution 3 drops both ears qhs for 3 days  Qty: 5 mL, Refills: 1    Associated Diagnoses: Bilateral otitis media, unspecified otitis media type      formoterol (PERFOROMIST) 20 MCG/2ML nebulizer solution Take 2 mL (20 mcg total) by nebulization 2 (two) times a day  Qty: 360 mL, Refills: 3    Associated Diagnoses: Chronic obstructive pulmonary disease, unspecified COPD type (HCC)      ipratropium-albuterol (DUO-NEB) 0.5-2.5 mg/3 mL nebulizer solution Take 3 mL by nebulization 2 (two) times a day  Qty: 540 mL, Refills: 3    Associated  Diagnoses: Chronic obstructive pulmonary disease, unspecified COPD type (HCC)      losartan (COZAAR) 25 mg tablet TAKE ONE TABLET BY MOUTH TWICE DAILYTOMAR 1 TABLETA POR VIA ORAL DOS VECES AL EULALIO  Qty: 180 tablet, Refills: 0    Associated Diagnoses: Benign essential hypertension      mometasone (ELOCON) 0.1 % lotion Apply topically daily  Qty: 60 mL, Refills: 5    Associated Diagnoses: Seborrhea      pantoprazole (PROTONIX) 40 mg tablet Take 1 tablet (40 mg total) by mouth daily  Qty: 30 tablet, Refills: 5    Associated Diagnoses: Long term (current) use of systemic steroids      senna-docusate sodium (SENOKOT S) 8.6-50 mg per tablet Take 1 tablet by mouth daily  Qty: 30 tablet, Refills: 5    Associated Diagnoses: Chronic idiopathic constipation             No discharge procedures on file.    PDMP Review         Value Time User    PDMP Reviewed  Yes 1/4/2024  9:05 AM Jennifer Paige Bloch, CRNP            ED Provider  Electronically Signed by             Sandy Hernandez PA-C  05/18/24 2024

## 2024-05-19 ENCOUNTER — HOSPITAL ENCOUNTER (INPATIENT)
Facility: HOSPITAL | Age: 89
LOS: 4 days | Discharge: HOME HEALTH | DRG: 189 | End: 2024-05-23
Attending: EMERGENCY MEDICINE | Admitting: INTERNAL MEDICINE
Payer: MEDICARE

## 2024-05-19 ENCOUNTER — APPOINTMENT (EMERGENCY)
Dept: CT IMAGING | Facility: HOSPITAL | Age: 89
DRG: 189 | End: 2024-05-19
Payer: MEDICARE

## 2024-05-19 DIAGNOSIS — J43.9 EMPHYSEMA OF LUNG (HCC): ICD-10-CM

## 2024-05-19 DIAGNOSIS — J96.11 CHRONIC RESPIRATORY FAILURE WITH HYPOXIA AND HYPERCAPNIA (HCC): Primary | ICD-10-CM

## 2024-05-19 DIAGNOSIS — K57.90 DIVERTICULOSIS: ICD-10-CM

## 2024-05-19 DIAGNOSIS — J96.12 CHRONIC RESPIRATORY FAILURE WITH HYPOXIA AND HYPERCAPNIA (HCC): Primary | ICD-10-CM

## 2024-05-19 DIAGNOSIS — K80.20 CHOLELITHIASIS: ICD-10-CM

## 2024-05-19 DIAGNOSIS — J96.91 RESPIRATORY FAILURE WITH HYPOXIA AND HYPERCAPNIA (HCC): ICD-10-CM

## 2024-05-19 DIAGNOSIS — J96.92 RESPIRATORY FAILURE WITH HYPOXIA AND HYPERCAPNIA (HCC): ICD-10-CM

## 2024-05-19 DIAGNOSIS — R41.82 AMS (ALTERED MENTAL STATUS): ICD-10-CM

## 2024-05-19 DIAGNOSIS — R06.89 HYPERCARBIA: ICD-10-CM

## 2024-05-19 DIAGNOSIS — J98.11 ATELECTASIS: ICD-10-CM

## 2024-05-19 DIAGNOSIS — N20.0 RENAL CALCULI: ICD-10-CM

## 2024-05-19 PROBLEM — I50.32 CHRONIC DIASTOLIC (CONGESTIVE) HEART FAILURE (HCC): Status: ACTIVE | Noted: 2024-05-19

## 2024-05-19 PROBLEM — G93.40 ENCEPHALOPATHY ACUTE: Status: ACTIVE | Noted: 2024-05-19

## 2024-05-19 LAB
ALBUMIN SERPL BCP-MCNC: 3.8 G/DL (ref 3.5–5)
ALP SERPL-CCNC: 70 U/L (ref 34–104)
ALT SERPL W P-5'-P-CCNC: 35 U/L (ref 7–52)
APTT PPP: 29 SECONDS (ref 23–37)
ARTERIAL PATENCY WRIST A: YES
AST SERPL W P-5'-P-CCNC: 32 U/L (ref 13–39)
ATRIAL RATE: 82 BPM
ATRIAL RATE: 89 BPM
ATRIAL RATE: 90 BPM
BASE EX.OXY STD BLDV CALC-SCNC: 47.7 % (ref 60–80)
BASE EX.OXY STD BLDV CALC-SCNC: 80.2 % (ref 60–80)
BASE EXCESS BLDA CALC-SCNC: 12.9 MMOL/L
BASE EXCESS BLDA CALC-SCNC: 8.6 MMOL/L
BASE EXCESS BLDV CALC-SCNC: 10.8 MMOL/L
BASE EXCESS BLDV CALC-SCNC: 6.7 MMOL/L
BASOPHILS # BLD AUTO: 0.02 THOUSANDS/ÂΜL (ref 0–0.1)
BASOPHILS NFR BLD AUTO: 0 % (ref 0–1)
BILIRUB SERPL-MCNC: 0.55 MG/DL (ref 0.2–1)
BUN SERPL-MCNC: 18 MG/DL (ref 5–25)
CALCIUM SERPL-MCNC: 9.2 MG/DL (ref 8.4–10.2)
CHLORIDE SERPL-SCNC: 99 MMOL/L (ref 96–108)
CO2 SERPL-SCNC: >45 MMOL/L (ref 21–32)
CREAT SERPL-MCNC: 0.84 MG/DL (ref 0.6–1.3)
EOSINOPHIL # BLD AUTO: 0.41 THOUSAND/ÂΜL (ref 0–0.61)
EOSINOPHIL NFR BLD AUTO: 5 % (ref 0–6)
ERYTHROCYTE [DISTWIDTH] IN BLOOD BY AUTOMATED COUNT: 13.1 % (ref 11.6–15.1)
GFR SERPL CREATININE-BSD FRML MDRD: 79 ML/MIN/1.73SQ M
GLUCOSE SERPL-MCNC: 101 MG/DL (ref 65–140)
GLUCOSE SERPL-MCNC: 108 MG/DL (ref 65–140)
HCO3 BLDA-SCNC: 38 MMOL/L (ref 22–28)
HCO3 BLDA-SCNC: 42.3 MMOL/L (ref 22–28)
HCO3 BLDV-SCNC: 34.7 MMOL/L (ref 24–30)
HCO3 BLDV-SCNC: 42 MMOL/L (ref 24–30)
HCT VFR BLD AUTO: 39 % (ref 36.5–49.3)
HGB BLD-MCNC: 11 G/DL (ref 12–17)
IMM GRANULOCYTES # BLD AUTO: 0.02 THOUSAND/UL (ref 0–0.2)
IMM GRANULOCYTES NFR BLD AUTO: 0 % (ref 0–2)
INR PPP: 1.07 (ref 0.84–1.19)
IPAP: 16
LACTATE SERPL-SCNC: 0.5 MMOL/L (ref 0.5–2)
LYMPHOCYTES # BLD AUTO: 1 THOUSANDS/ÂΜL (ref 0.6–4.47)
LYMPHOCYTES NFR BLD AUTO: 13 % (ref 14–44)
MCH RBC QN AUTO: 31 PG (ref 26.8–34.3)
MCHC RBC AUTO-ENTMCNC: 28.2 G/DL (ref 31.4–37.4)
MCV RBC AUTO: 110 FL (ref 82–98)
MONOCYTES # BLD AUTO: 0.95 THOUSAND/ÂΜL (ref 0.17–1.22)
MONOCYTES NFR BLD AUTO: 13 % (ref 4–12)
NEUTROPHILS # BLD AUTO: 5.23 THOUSANDS/ÂΜL (ref 1.85–7.62)
NEUTS SEG NFR BLD AUTO: 69 % (ref 43–75)
NON VENT- BIPAP: ABNORMAL
NON VENT- BIPAP: ABNORMAL
NRBC BLD AUTO-RTO: 0 /100 WBCS
O2 CT BLDA-SCNC: 15.5 ML/DL (ref 16–23)
O2 CT BLDA-SCNC: 15.7 ML/DL (ref 16–23)
O2 CT BLDV-SCNC: 12.3 ML/DL
O2 CT BLDV-SCNC: 7.9 ML/DL
OXYHGB MFR BLDA: 94.7 % (ref 94–97)
OXYHGB MFR BLDA: 95.3 % (ref 94–97)
P AXIS: 59 DEGREES
P AXIS: 73 DEGREES
P AXIS: 73 DEGREES
PCO2 BLDA: 82.5 MM HG (ref 36–44)
PCO2 BLDA: 85.1 MM HG (ref 36–44)
PCO2 BLDV: 103.4 MM HG (ref 42–50)
PCO2 BLDV: 70.1 MM HG (ref 42–50)
PEEP MAX SETTING VENT: 6 CM[H2O]
PH BLDA: 7.28 [PH] (ref 7.35–7.45)
PH BLDA: 7.31 [PH] (ref 7.35–7.45)
PH BLDV: 7.23 [PH] (ref 7.3–7.4)
PH BLDV: 7.31 [PH] (ref 7.3–7.4)
PLATELET # BLD AUTO: 115 THOUSANDS/UL (ref 149–390)
PLATELET # BLD AUTO: 124 THOUSANDS/UL (ref 149–390)
PMV BLD AUTO: 9 FL (ref 8.9–12.7)
PMV BLD AUTO: 9.4 FL (ref 8.9–12.7)
PO2 BLDA: 84.4 MM HG (ref 75–129)
PO2 BLDA: 85.5 MM HG (ref 75–129)
PO2 BLDV: 28.5 MM HG (ref 35–45)
PO2 BLDV: 45.7 MM HG (ref 35–45)
POTASSIUM SERPL-SCNC: 4.1 MMOL/L (ref 3.5–5.3)
PR INTERVAL: 152 MS
PR INTERVAL: 152 MS
PR INTERVAL: 162 MS
PROCALCITONIN SERPL-MCNC: 0.13 NG/ML
PROT SERPL-MCNC: 6.6 G/DL (ref 6.4–8.4)
PROTHROMBIN TIME: 14.2 SECONDS (ref 11.6–14.5)
QRS AXIS: 28 DEGREES
QRS AXIS: 75 DEGREES
QRS AXIS: 77 DEGREES
QRSD INTERVAL: 82 MS
QRSD INTERVAL: 86 MS
QRSD INTERVAL: 86 MS
QT INTERVAL: 338 MS
QT INTERVAL: 342 MS
QT INTERVAL: 378 MS
QTC INTERVAL: 413 MS
QTC INTERVAL: 416 MS
QTC INTERVAL: 441 MS
RBC # BLD AUTO: 3.55 MILLION/UL (ref 3.88–5.62)
SODIUM SERPL-SCNC: 144 MMOL/L (ref 135–147)
SPECIMEN SOURCE: ABNORMAL
SPECIMEN SOURCE: ABNORMAL
T WAVE AXIS: 44 DEGREES
T WAVE AXIS: 69 DEGREES
T WAVE AXIS: 69 DEGREES
VENT BIPAP FIO2: 40 %
VENTRICULAR RATE: 82 BPM
VENTRICULAR RATE: 89 BPM
VENTRICULAR RATE: 90 BPM
WBC # BLD AUTO: 7.63 THOUSAND/UL (ref 4.31–10.16)

## 2024-05-19 PROCEDURE — 85049 AUTOMATED PLATELET COUNT: CPT | Performed by: NURSE PRACTITIONER

## 2024-05-19 PROCEDURE — 80053 COMPREHEN METABOLIC PANEL: CPT

## 2024-05-19 PROCEDURE — 70450 CT HEAD/BRAIN W/O DYE: CPT

## 2024-05-19 PROCEDURE — 85730 THROMBOPLASTIN TIME PARTIAL: CPT

## 2024-05-19 PROCEDURE — 99285 EMERGENCY DEPT VISIT HI MDM: CPT

## 2024-05-19 PROCEDURE — 36600 WITHDRAWAL OF ARTERIAL BLOOD: CPT

## 2024-05-19 PROCEDURE — 85610 PROTHROMBIN TIME: CPT

## 2024-05-19 PROCEDURE — 93010 ELECTROCARDIOGRAM REPORT: CPT | Performed by: INTERNAL MEDICINE

## 2024-05-19 PROCEDURE — 99291 CRITICAL CARE FIRST HOUR: CPT

## 2024-05-19 PROCEDURE — 36415 COLL VENOUS BLD VENIPUNCTURE: CPT

## 2024-05-19 PROCEDURE — 84145 PROCALCITONIN (PCT): CPT

## 2024-05-19 PROCEDURE — 87040 BLOOD CULTURE FOR BACTERIA: CPT

## 2024-05-19 PROCEDURE — 94760 N-INVAS EAR/PLS OXIMETRY 1: CPT

## 2024-05-19 PROCEDURE — 82805 BLOOD GASES W/O2 SATURATION: CPT

## 2024-05-19 PROCEDURE — 93005 ELECTROCARDIOGRAM TRACING: CPT

## 2024-05-19 PROCEDURE — 83605 ASSAY OF LACTIC ACID: CPT

## 2024-05-19 PROCEDURE — 82948 REAGENT STRIP/BLOOD GLUCOSE: CPT

## 2024-05-19 PROCEDURE — 94640 AIRWAY INHALATION TREATMENT: CPT

## 2024-05-19 PROCEDURE — 74177 CT ABD & PELVIS W/CONTRAST: CPT

## 2024-05-19 PROCEDURE — 85025 COMPLETE CBC W/AUTO DIFF WBC: CPT

## 2024-05-19 PROCEDURE — 94002 VENT MGMT INPAT INIT DAY: CPT

## 2024-05-19 PROCEDURE — 99291 CRITICAL CARE FIRST HOUR: CPT | Performed by: INTERNAL MEDICINE

## 2024-05-19 PROCEDURE — 71260 CT THORAX DX C+: CPT

## 2024-05-19 RX ORDER — ACETAMINOPHEN 325 MG/1
975 TABLET ORAL EVERY 6 HOURS PRN
Status: DISCONTINUED | OUTPATIENT
Start: 2024-05-19 | End: 2024-05-23 | Stop reason: HOSPADM

## 2024-05-19 RX ORDER — SENNOSIDES 8.6 MG
1 TABLET ORAL
Status: DISCONTINUED | OUTPATIENT
Start: 2024-05-19 | End: 2024-05-23 | Stop reason: HOSPADM

## 2024-05-19 RX ORDER — FORMOTEROL FUMARATE DIHYDRATE 20 UG/2ML
20 SOLUTION RESPIRATORY (INHALATION)
Status: DISCONTINUED | OUTPATIENT
Start: 2024-05-19 | End: 2024-05-23 | Stop reason: HOSPADM

## 2024-05-19 RX ORDER — CHLORHEXIDINE GLUCONATE ORAL RINSE 1.2 MG/ML
15 SOLUTION DENTAL EVERY 12 HOURS SCHEDULED
Status: DISCONTINUED | OUTPATIENT
Start: 2024-05-19 | End: 2024-05-21

## 2024-05-19 RX ORDER — IPRATROPIUM BROMIDE AND ALBUTEROL SULFATE .5; 3 MG/3ML; MG/3ML
1 SOLUTION RESPIRATORY (INHALATION) ONCE
Status: COMPLETED | OUTPATIENT
Start: 2024-05-19 | End: 2024-05-19

## 2024-05-19 RX ORDER — METHYLPREDNISOLONE SODIUM SUCCINATE 40 MG/ML
40 INJECTION, POWDER, LYOPHILIZED, FOR SOLUTION INTRAMUSCULAR; INTRAVENOUS EVERY 12 HOURS SCHEDULED
Status: DISCONTINUED | OUTPATIENT
Start: 2024-05-19 | End: 2024-05-20

## 2024-05-19 RX ORDER — LEVALBUTEROL INHALATION SOLUTION 1.25 MG/3ML
1.25 SOLUTION RESPIRATORY (INHALATION) EVERY 8 HOURS PRN
Status: DISCONTINUED | OUTPATIENT
Start: 2024-05-19 | End: 2024-05-23 | Stop reason: HOSPADM

## 2024-05-19 RX ORDER — BUDESONIDE 0.5 MG/2ML
0.5 INHALANT ORAL
Status: DISCONTINUED | OUTPATIENT
Start: 2024-05-19 | End: 2024-05-23 | Stop reason: HOSPADM

## 2024-05-19 RX ORDER — HEPARIN SODIUM 5000 [USP'U]/ML
5000 INJECTION, SOLUTION INTRAVENOUS; SUBCUTANEOUS EVERY 8 HOURS SCHEDULED
Status: DISCONTINUED | OUTPATIENT
Start: 2024-05-19 | End: 2024-05-23 | Stop reason: HOSPADM

## 2024-05-19 RX ORDER — LOSARTAN POTASSIUM 25 MG/1
25 TABLET ORAL DAILY
Status: DISCONTINUED | OUTPATIENT
Start: 2024-05-19 | End: 2024-05-20

## 2024-05-19 RX ORDER — PANTOPRAZOLE SODIUM 40 MG/1
40 TABLET, DELAYED RELEASE ORAL
Status: DISCONTINUED | OUTPATIENT
Start: 2024-05-20 | End: 2024-05-23 | Stop reason: HOSPADM

## 2024-05-19 RX ORDER — ATORVASTATIN CALCIUM 20 MG/1
20 TABLET, FILM COATED ORAL
Status: DISCONTINUED | OUTPATIENT
Start: 2024-05-19 | End: 2024-05-23 | Stop reason: HOSPADM

## 2024-05-19 RX ADMIN — SENNOSIDES 8.6 MG: 8.6 TABLET, FILM COATED ORAL at 21:08

## 2024-05-19 RX ADMIN — HEPARIN SODIUM 5000 UNITS: 5000 INJECTION INTRAVENOUS; SUBCUTANEOUS at 16:11

## 2024-05-19 RX ADMIN — METHYLPREDNISOLONE SODIUM SUCCINATE 40 MG: 40 INJECTION, POWDER, FOR SOLUTION INTRAMUSCULAR; INTRAVENOUS at 16:10

## 2024-05-19 RX ADMIN — FORMOTEROL FUMARATE 20 MCG: 20 SOLUTION RESPIRATORY (INHALATION) at 20:08

## 2024-05-19 RX ADMIN — CHLORHEXIDINE GLUCONATE 15 ML: 1.2 RINSE ORAL at 16:11

## 2024-05-19 RX ADMIN — ATORVASTATIN CALCIUM 20 MG: 20 TABLET, FILM COATED ORAL at 16:11

## 2024-05-19 RX ADMIN — LOSARTAN POTASSIUM 25 MG: 25 TABLET, FILM COATED ORAL at 16:11

## 2024-05-19 RX ADMIN — IOHEXOL 100 ML: 350 INJECTION, SOLUTION INTRAVENOUS at 09:38

## 2024-05-19 RX ADMIN — BUDESONIDE 0.5 MG: 0.5 INHALANT RESPIRATORY (INHALATION) at 20:08

## 2024-05-19 RX ADMIN — METHYLPREDNISOLONE SODIUM SUCCINATE 40 MG: 40 INJECTION, POWDER, FOR SOLUTION INTRAMUSCULAR; INTRAVENOUS at 21:08

## 2024-05-19 RX ADMIN — HEPARIN SODIUM 5000 UNITS: 5000 INJECTION INTRAVENOUS; SUBCUTANEOUS at 21:08

## 2024-05-19 NOTE — PLAN OF CARE

## 2024-05-19 NOTE — ASSESSMENT & PLAN NOTE
Secondary to severe COPD  Continue BiPAP as needed  O2 via nasal cannula while awake  Continue updraft nebulizer treatments

## 2024-05-19 NOTE — ASSESSMENT & PLAN NOTE
Continuous cardiopulmonary monitoring  BiPAP at night and at rest  On chronic oxygen at home 2 to 3 L by nasal cannula

## 2024-05-19 NOTE — ED PROVIDER NOTES
History  Chief Complaint   Patient presents with    Shortness of Breath     Patient arrives via [facility] with reports of SOB and AMS.      88 YOM with PMH ascending aortic aneurysm, CAD, CHF, COPD, DM, HTN, DVT, acute metabolic encephalopathy, GERD presents today due to altered mental status and shortness of breath. Pt arrives awake but will not respond, on NRB. Called pt's son to get collateral information. Per son pt was at  yesterday due to feeling sick. Was sent home. But this morning son arrived to their house and wife reported that pt was in bed, not speaking, shaking and felt warm. The son called EMS.         Prior to Admission Medications   Prescriptions Last Dose Informant Patient Reported? Taking?   acetaminophen (TYLENOL) 650 mg CR tablet   No No   Sig: Take 1 tablet (650 mg total) by mouth every 8 (eight) hours as needed for mild pain   albuterol (PROVENTIL HFA,VENTOLIN HFA) 90 mcg/act inhaler   Yes No   Sig: Inhale 2 puffs every 6 (six) hours as needed for wheezing   brimonidine tartrate 0.2 % ophthalmic solution   Yes No   Sig: INSTILL 1 DROP IN LEFT EYE TWICE DAILY INSTILL 1 DROP IN LEFT EYE DOS VECES AL EULALIO   budesonide (Pulmicort) 0.5 mg/2 mL nebulizer solution   No No   Sig: Take 2 mL (0.5 mg total) by nebulization 2 (two) times a day Rinse mouth after use.   ciprofloxacin (CILOXAN) 0.3 % ophthalmic solution   No No   Sig: 3 drops both ears qhs for 3 days   Patient not taking: Reported on 5/3/2024   formoterol (PERFOROMIST) 20 MCG/2ML nebulizer solution   No No   Sig: Take 2 mL (20 mcg total) by nebulization 2 (two) times a day   ipratropium-albuterol (DUO-NEB) 0.5-2.5 mg/3 mL nebulizer solution   No No   Sig: Take 3 mL by nebulization 2 (two) times a day   losartan (COZAAR) 25 mg tablet   No No   Sig: TAKE ONE TABLET BY MOUTH TWICE DAILYTOMAR 1 TABLETA POR VIA ORAL DOS VECES AL EULALIO   mometasone (ELOCON) 0.1 % lotion   No No   Sig: Apply topically daily   pantoprazole (PROTONIX) 40 mg tablet   No No    Sig: Take 1 tablet (40 mg total) by mouth daily   senna-docusate sodium (SENOKOT S) 8.6-50 mg per tablet   No No   Sig: Take 1 tablet by mouth daily      Facility-Administered Medications: None       Past Medical History:   Diagnosis Date    Acute metabolic encephalopathy 06/13/2020    Age-related cataract of right eye 04/04/2023    patient is medically cleared and presents with low risk of complication with this upcoming R cataract surgery.    Anemia     Aneurysm, aorta, thoracic (HCC)     Appendicolith     Ascending aortic aneurysm (HCC)     3.7    Asthma     BPH (benign prostatic hyperplasia)     CAD (coronary artery disease)     noted on CT scan    CHF (congestive heart failure) (HCC)     COPD (chronic obstructive pulmonary disease) (HCC)     Descending thoracic aortic aneurysm (HCC)     Diabetes mellitus (HCC)     Diverticulosis     Former tobacco use     GERD (gastroesophageal reflux disease)     History of DVT (deep vein thrombosis)     Left leg    History of transfusion     Hypertension     Hypoxemia 04/30/2023    Inguinal hernia     right    Nephrolithiasis     Oxygen dependent     2LNC    Oxygen dependent     Pneumonia     Pre-diabetes     Prostate calculus     PVD (peripheral vascular disease) (HCC)     Recurrent right inguinal hernia w incarcertion 01/04/2023    Thoracic aortic aneurysm without rupture (HCC) 11/19/2018    Added automatically from request for surgery [id number]    Thrombocytopenia (HCC) 12/29/2018    Ulcer     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)     Varicose vein of leg     b/l       Past Surgical History:   Procedure Laterality Date    CARDIAC SURGERY      ESOPHAGOGASTRODUODENOSCOPY      HERNIA REPAIR Right 1/21/2019    Procedure: REPAIR HERNIA INGUINAL WITH MESH;  Surgeon: David Lowry MD;  Location:  MAIN OR;  Service: General    HERNIA REPAIR Right 7/22/2023    Procedure: REPAIR RECURRENT INCARERATED HERNIA INGUINAL OPEN, RIGHT ORCHIECTOMY;  Surgeon: Mason  MD Elza;  Location: [facility];  Service: General    INGUINAL HERNIA REPAIR Bilateral     IR TEVAR  2018    NV EVASC RPR DTA COVERAGE ART ORIGIN 1ST ENDOPROSTH N/A 2018    Procedure: TEVAR - endovascular thoracic aortic aneurysm repair;  Surgeon: Gladis Ortega MD;  Location: [facility];  Service: Vascular    THORACIC AORTIC ANEURYSM REPAIR  2018    VARICOSE VEIN SURGERY Bilateral     vein stripping       Family History   Problem Relation Age of Onset    Tuberculosis Mother     No Known Problems Father     Cancer Sister     Diabetes Family     Hypertension Family      I have reviewed and agree with the history as documented.    E-Cigarette/Vaping    E-Cigarette Use Never User      E-Cigarette/Vaping Substances    Nicotine No     THC No     CBD No     Flavoring No     Other No     Unknown No      Social History     Tobacco Use    Smoking status: Former     Current packs/day: 0.00     Average packs/day: 1 pack/day for 35.0 years (35.0 ttl pk-yrs)     Types: Cigarettes     Start date:      Quit date:      Years since quittin.3    Smokeless tobacco: Never   Vaping Use    Vaping status: Never Used   Substance Use Topics    Alcohol use: Never    Drug use: No       Review of Systems   Unable to perform ROS: Mental status change       Physical Exam  Physical Exam  Vitals and nursing note reviewed.   Constitutional:       General: He is in acute distress.      Appearance: He is well-developed. He is ill-appearing and diaphoretic.   HENT:      Head: Normocephalic and atraumatic.   Eyes:      Conjunctiva/sclera: Conjunctivae normal.   Cardiovascular:      Rate and Rhythm: Normal rate and regular rhythm.      Heart sounds: No murmur heard.  Pulmonary:      Effort: Tachypnea and respiratory distress present.      Breath sounds: Decreased breath sounds present.   Abdominal:      Palpations: Abdomen is soft.      Tenderness: There is no abdominal tenderness.   Musculoskeletal:         General: No  swelling.      Cervical back: Neck supple.   Skin:     General: Skin is warm.      Capillary Refill: Capillary refill takes less than 2 seconds.   Neurological:      Mental Status: He is alert.   Psychiatric:         Mood and Affect: Mood normal.         Vital Signs  ED Triage Vitals [05/19/24 0747]   Temperature Pulse Respirations Blood Pressure SpO2   (!) 100.8 °F (38.2 °C) 96 (!) 24 (!) 185/79 96 %      Temp Source Heart Rate Source Patient Position - Orthostatic VS BP Location FiO2 (%)   Oral Monitor Sitting Left arm --      Pain Score       --           Vitals:    05/19/24 1030 05/19/24 1031 05/19/24 1100 05/19/24 1200   BP: 118/55 118/55 149/67 141/60   Pulse: 72 73 74 74   Patient Position - Orthostatic VS:  Lying Sitting Sitting         Visual Acuity      ED Medications  Medications   chlorhexidine (PERIDEX) 0.12 % oral rinse 15 mL (has no administration in time range)   heparin (porcine) subcutaneous injection 5,000 Units (has no administration in time range)   ipratropium-albuterol (FOR EMS ONLY) (DUO-NEB) 0.5-2.5 mg/3 mL inhalation solution 3 mL (0 mL Does not apply Given to EMS 5/19/24 0747)   iohexol (OMNIPAQUE) 350 MG/ML injection (MULTI-DOSE) 100 mL (100 mL Intravenous Given 5/19/24 0938)       Diagnostic Studies  Results Reviewed       Procedure Component Value Units Date/Time    Platelet count [[id number]]     Lab Status: No result Specimen: Blood     Blood gas, arterial [[id number]]  (Abnormal) Collected: 05/19/24 1203    Lab Status: Final result Specimen: Blood, Arterial from Radial, Left Updated: 05/19/24 1217     pH, Arterial 7.314     pCO2, Arterial 85.1 mm Hg      pO2, Arterial 85.5 mm Hg      HCO3, Arterial 42.3 mmol/L      Base Excess, Arterial 12.9 mmol/L      O2 Content, Arterial 15.5 mL/dL      O2 HGB,Arterial  95.3 %      SOURCE Radial, Left     AVINASH TEST Yes     Non Vent type BIPAP BIPAP     IPAP 16     EPAP 6     BIPAP fio2 40 %     Procalcitonin [[id number]]  (Normal) Collected:  05/19/24 0801    Lab Status: Final result Specimen: Blood from Arm, Right Updated: 05/19/24 0903     Procalcitonin 0.13 ng/ml     Comprehensive metabolic panel [[id number]]  (Abnormal) Collected: 05/19/24 0801    Lab Status: Final result Specimen: Blood from Arm, Right Updated: 05/19/24 0852     Sodium 144 mmol/L      Potassium 4.1 mmol/L      Chloride 99 mmol/L      CO2 >45 mmol/L      ANION GAP --     BUN 18 mg/dL      Creatinine 0.84 mg/dL      Glucose 108 mg/dL      Calcium 9.2 mg/dL      AST 32 U/L      ALT 35 U/L      Alkaline Phosphatase 70 U/L      Total Protein 6.6 g/dL      Albumin 3.8 g/dL      Total Bilirubin 0.55 mg/dL      eGFR 79 ml/min/1.73sq m     Narrative:      National Kidney Disease Foundation guidelines for Chronic Kidney Disease (CKD):     Stage 1 with normal or high GFR (GFR > 90 mL/min/1.73 square meters)    Stage 2 Mild CKD (GFR = 60-89 mL/min/1.73 square meters)    Stage 3A Moderate CKD (GFR = 45-59 mL/min/1.73 square meters)    Stage 3B Moderate CKD (GFR = 30-44 mL/min/1.73 square meters)    Stage 4 Severe CKD (GFR = 15-29 mL/min/1.73 square meters)    Stage 5 End Stage CKD (GFR <15 mL/min/1.73 square meters)  Note: GFR calculation is accurate only with a steady state creatinine    Blood culture #2 [[id number]] Collected: 05/19/24 0844    Lab Status: In process Specimen: Blood from Arm, Right Updated: 05/19/24 0851    Lactic acid [[id number]]  (Normal) Collected: 05/19/24 0801    Lab Status: Final result Specimen: Blood from Arm, Right Updated: 05/19/24 0836     LACTIC ACID 0.5 mmol/L     Narrative:      Result may be elevated if tourniquet was used during collection.    Protime-INR [[id number]]  (Normal) Collected: 05/19/24 0801    Lab Status: Final result Specimen: Blood from Arm, Right Updated: 05/19/24 0834     Protime 14.2 seconds      INR 1.07    APTT [[id number]]  (Normal) Collected: 05/19/24 0801    Lab Status: Final result Specimen: Blood from Arm, Right Updated: 05/19/24 0834     PTT  29 seconds     Blood gas, arterial [[id number]]  (Abnormal) Collected: 05/19/24 0812    Lab Status: Final result Specimen: Blood, Arterial from Radial, Right Updated: 05/19/24 0831     pH, Arterial 7.281     pCO2, Arterial 82.5 mm Hg      pO2, Arterial 84.4 mm Hg      HCO3, Arterial 38.0 mmol/L      Base Excess, Arterial 8.6 mmol/L      O2 Content, Arterial 15.7 mL/dL      O2 HGB,Arterial  94.7 %      SOURCE Radial, Right     AVINASH TEST Yes     Non Vent Type- Aerosol Mask --    Blood gas, Venous [[id number]]  (Abnormal) Collected: 05/19/24 0800    Lab Status: Final result Specimen: Blood from Arm, Right Updated: 05/19/24 0831     pH, Nabeel 7.227     pCO2, Nabeel 103.4 mm Hg      pO2, Nabeel 28.5 mm Hg      HCO3, Nabeel 42.0 mmol/L      Base Excess, Nabeel 10.8 mmol/L      O2 Content, Nabeel 7.9 ml/dL      O2 HGB, VENOUS 47.7 %     CBC and differential [[id number]]  (Abnormal) Collected: 05/19/24 0801    Lab Status: Final result Specimen: Blood from Arm, Right Updated: 05/19/24 0819     WBC 7.63 Thousand/uL      RBC 3.55 Million/uL      Hemoglobin 11.0 g/dL      Hematocrit 39.0 %       fL      MCH 31.0 pg      MCHC 28.2 g/dL      RDW 13.1 %      MPV 9.0 fL      Platelets 124 Thousands/uL      nRBC 0 /100 WBCs      Segmented % 69 %      Immature Grans % 0 %      Lymphocytes % 13 %      Monocytes % 13 %      Eosinophils Relative 5 %      Basophils Relative 0 %      Absolute Neutrophils 5.23 Thousands/µL      Absolute Immature Grans 0.02 Thousand/uL      Absolute Lymphocytes 1.00 Thousands/µL      Absolute Monocytes 0.95 Thousand/µL      Eosinophils Absolute 0.41 Thousand/µL      Basophils Absolute 0.02 Thousands/µL     Blood culture #1 [[id number]] Collected: 05/19/24 0800    Lab Status: In process Specimen: Blood from Arm, Right Updated: 05/19/24 0817    Fingerstick Glucose (POCT) [[id number]]  (Normal) Collected: 05/19/24 0756    Lab Status: Final result Specimen: Blood Updated: 05/19/24 0757     POC Glucose 101 mg/dl     UA w  Reflex to Microscopic w Reflex to Culture [[id number]]     Lab Status: No result Specimen: Urine                    CT head without contrast   Final Result by Mason Greenberg MD (05/19 1008)      No acute intracranial abnormality.  Chronic microangiopathic changes.   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               Workstation performed: [id number]         CT chest abdomen pelvis w contrast   Final Result by Francisco Guerrier MD (05/19 1148)      1.  Bilateral nonobstructing renal calculi.      2.  Thoracic aortic aneurysm, unchanged since CTs dating back to 2020.      3.  Emphysema.      4.  Scattered subsegmental atelectasis in both lungs.      5.  Cholelithiasis without evidence of acute cholecystitis.      6.  Colonic diverticulosis without evidence of acute diverticulitis.               Workstation performed: [id number]                    Procedures  ECG 12 Lead Documentation Only    Date/Time: 5/19/2024 8:47 AM    Performed by: Lorena Bhakta PA-C  Authorized by: Lorena Bhakta PA-C    Indications / Diagnosis:  AMS, SOB  ECG reviewed by me, the ED Provider: yes (and Dr. Edison Garcia)    Patient location:  ED  Previous ECG:     Previous ECG:  Compared to current    Similarity:  No change    Comparison to cardiac monitor: Yes    Interpretation:     Interpretation: normal    Rate:     ECG rate:  89    ECG rate assessment: normal    Rhythm:     Rhythm: sinus rhythm    Ectopy:     Ectopy: none    CriticalCare Time    Date/Time: 5/19/2024 8:47 AM    Performed by: Lorena Bhakta PA-C  Authorized by: Lorena Bhakta PA-C    Critical care provider statement:     Critical care time (minutes):  30    Critical care time was exclusive of:  Separately billable procedures and treating other patients and teaching time    Critical care was necessary to treat or prevent imminent or life-threatening deterioration of the following conditions:   Respiratory failure    Critical care was time spent personally by me on the following activities:  Interpretation of cardiac output measurements, obtaining history from patient or surrogate, ordering and performing treatments and interventions, development of treatment plan with patient or surrogate, ordering and review of laboratory studies, ordering and review of radiographic studies, re-evaluation of patient's condition, review of old charts, evaluation of patient's response to treatment and examination of patient    I assumed direction of critical care for this patient from another provider in my specialty: no             ED Course  ED Course as of 05/19/24 1245   Sun May 19, 2024   0805 Spoke with son who called 911. Arrived at his parents house this morning and pt was laying in bed and not speaking. Reports that pt felt very hot and was shaking.    0831 pH, Arterial(!): 7.281   0831 pCO2, Arterial(!!): 82.5   0831 HCO3, Arterial(!): 38.0   0837 pCO2, Nabeel(!!): 103.4  44 yesterday    0838 WBC: 7.63   0838 LACTIC ACID: 0.5   0847 Absolute Basophils: 0.02   0851 Covid/Flu/RSV test negative yesterday    0853 Carbon Dioxide(!): >45   0951 Re-eval of pt: more awake, responsive. Family is in the room. Son reports that pt told him he is feeling better. Pt still on BiPap    1011 CT head without contrast  No acute intracranial abnormality.  Chronic microangiopathic changes.  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     1139 Re-eval of pt: more awake and alert, trying to take off BiPAP but able to be redirected by his wife. Will get repeat ABG. Pending CT scan and admission   1151 CT chest abdomen pelvis w contrast  1.  Bilateral nonobstructing renal calculi.     2.  Thoracic aortic aneurysm, unchanged since CTs dating back to 2020.     3.  Emphysema.     4.  Scattered subsegmental atelectasis in both lungs.     5.  Cholelithiasis without evidence of acute cholecystitis.     6.   Colonic diverticulosis without evidence of acute diverticulitis.     1216 TT to critical care    1218 pCO2, Arterial(!!): 85.1   1218 pH, Arterial(!): 7.314   1241 Pt will be admitted to SD2                                             Medical Decision Making  See ED course above.     Pt admitted to SD2     Amount and/or Complexity of Data Reviewed  Labs: ordered. Decision-making details documented in ED Course.  Radiology: ordered. Decision-making details documented in ED Course.    Risk  Prescription drug management.  Decision regarding hospitalization.             Disposition  Final diagnoses:   Hypercarbia   AMS (altered mental status)   Renal calculi   Emphysema of lung (HCC)   Atelectasis   Cholelithiasis   Diverticulosis     Time reflects when diagnosis was documented in both MDM as applicable and the Disposition within this note       Time User Action Codes Description Comment    5/19/2024  8:50 AM Livia Kailen Add [G93.6] Hypercarbic cerebral edema (HCC)     5/19/2024  8:51 AM LiviaSergioilen Remove [G93.6] Hypercarbic cerebral edema (HCC)     5/19/2024  8:51 AM Livia Kailen Add [R06.89] Hypercarbia     5/19/2024  8:51 AM Livia Kailen Add [R41.82] AMS (altered mental status)     5/19/2024 11:51 AM LiviaSergioilen Add [N20.0] Renal calculi     5/19/2024 11:51 AM Livia Kailen Add [T81.82XA] Subcutaneous emphysema resulting from a procedure, initial encounter     5/19/2024 11:51 AM Lorena Bhakta Remove [T81.82XA] Subcutaneous emphysema resulting from a procedure, initial encounter     5/19/2024 11:51 AM LiviaSergioilen Add [J43.9] Emphysema of lung (HCC)     5/19/2024 11:51 AM LiviaSergioilen Add [J98.11] Atelectasis     5/19/2024 11:52 AM LiviaSergioilen Add [K80.20] Cholelithiasis     5/19/2024 11:52 AM LiviaSergioilen Add [K57.90] Diverticulosis     5/19/2024 12:43 PM Roe Kruse Modify [R06.89] Hypercarbia     5/19/2024 12:43 PM Roe Kruse Add [J96.11,  J96.12] Chronic respiratory failure with hypoxia and  hypercapnia (HCC)           ED Disposition       ED Disposition   Admit    Condition   Stable    Date/Time   Sun May 19, 2024 12:41 PM    Comment   Case was discussed with ICU and he will be admitted to SD2               Follow-up Information    None         Patient's Medications   Discharge Prescriptions    No medications on file       No discharge procedures on file.    PDMP Review         Value Time User    PDMP Reviewed  Yes 1/4/2024  9:05 AM Jennifer Paige Bloch, CRNP            ED Provider  Electronically Signed by             Lorena Bhakta PA-C  05/19/24 2692

## 2024-05-19 NOTE — CASE MANAGEMENT
Case Management ED Assessment    Patient name Severiano Feliciano  Location ED-29/ED-29 MRN [id number]  : 1935 Date 2024        OBJECTIVE:  Predictive Model Details          80%  Factor Value    Risk of Hospital Admission or ED Visit Model 28% Number of ED Visits 5+     24% Is in Relationship Yes     16% Number of Hospitalizations 5+     10% Has Medicaid Yes     6% Has Chronic Kidney Disease Yes     4% Has Peripheral Vascular Disease Yes     4% Has COPD Yes     3% Has CVD Yes     3% Has Diabetes Yes     2% Has CHF Yes     1% Has PCP Yes            Chief Complaint: Chronic obstructive pulmonary disease, unspecified COPD type (HCC) .  Patient Class: Inpatient  Preferred Pharmacy:   [facility] - [place], [place] - [address]  [address]  [facility] [address]  Phone: [phone] Fax: [phone]    [facility] - [place] - [address]  [address]  [address]  [facility] [address]  Phone: [phone] Fax: [phone]    Primary Care Provider: Kirby Casanova MD    Primary Insurance: [employer]  REP  Secondary Insurance: [facility]    ED ASSESSMENT:  Active Health Care Proxies       Viridiana Zambrano Health Care Representative - Spouse   Primary Phone: [phone] (Mobile)  Home Phone: [phone]                      Readmission Root Cause  30 Day Readmission: Yes  Who directed you to return to the hospital?: Family  Did you understand whom to contact if you had questions or problems?: Yes  Did you get your prescriptions before you left the hospital?: Yes  Were you able to get your prescriptions filled when you left the hospital?: Yes  Did you take your medications as prescribed?: Yes  Were you able to get to your follow-up appointments?: Yes  Patient was readmitted due to: COPD exacerbation and AMS  Action Plan: medically tx, return home under care of son and continue with Pulm/Cardio rehab    Outpatient  Care Information  Have you seen a doctor in the last year?: Yes  Specify which physician group(s) you have seen in the past year.: [facility]  [facility] services used within the past year.: PCP, Pulmonology    Prescribed Medications Prior to Admission/ED Visit  Has patient been prescribed medications (prior to this ED visit/admission)?: Yes  Any difficulty obtaining medications?: No  Has the patient been taking all prescribed medication(s)?: Yes  Does the patient have difficulty affording medication(s)?: No    Patient Information  Admitted from:: Home  Mental Status: Confused  During Assessment patient was accompanied by: Not accompanied during assessment  Assessment information provided by:: Son  Primary Caregiver: Child  Caregiver's Name:: Julio Severiano  Caregiver's Relationship to Patient:: Family Member (son)  Caregiver's Telephone Number:: [phone]  Support Systems: Self, Son, Children, Spouse/significant other  County of Residence: [place]  What city do you live in?: [place]  Home entry access options. Select all that apply.: Stairs  Number of steps to enter home.: 5  Living Arrangements: Lives w/ Spouse/significant other    Patient Information Continued  Income Source: Pension/FPC  Does patient have prescription coverage?: Yes  Does patient receive dialysis treatments?: No  Does patient have a history of substance abuse?: No  Does patient have a history of Mental Health Diagnosis?: No    Food and Transportation  What is patient's usual means of transportation?: Family Transport      CM spoke with patient's sonDaljit via telephone. Patient was recently hospitalized for a couple days for COPD exacerbation. Per chart review, he as at [facility] from 4/25/24-4/27/24. Patient resides with his spouse. Daljit is patient's paid caregiver. Patient ambulates with cane PRN. He wears 3LO2 at home. The O2 used to be supplied through [employer] but is now through [employer]. He continues to participate in cardiac/Pulm rehab.  No hx of VNA or SNF. Daljit mentioned that patient had some recent stress of his cat running away but the cat came back.

## 2024-05-19 NOTE — ASSESSMENT & PLAN NOTE
Pt with hx of end-stage COPD with frequent hospital admissions.  Current medication regimen: Budesonide twice daily, Perforomist twice daily, DuoNeb prn.    Recommend palliative care, hospice.  History of tobacco dependence for 40 years 10 cigarette daily. Stopped smoking aprox 30 years ago  Admit to the intensive care unit  Continuous cardiopulmonary monitoring  Continue nebulizer treatments  Hold off on starting systemic steroids at this time  BiPAP as needed

## 2024-05-19 NOTE — ASSESSMENT & PLAN NOTE
Wt Readings from Last 3 Encounters:   05/19/24 64 kg (141 lb 1.5 oz)   05/18/24 61.1 kg (134 lb 12.8 oz)   05/09/24 62.1 kg (137 lb)     Daily weights  Diuretics as needed  Consider repeat echocardiogram  Consider cardiology consult

## 2024-05-19 NOTE — ASSESSMENT & PLAN NOTE
Found at home in bed by son, unable to speak and shaking.  Likely secondary to hypoxia and hypercapnia  Resolving with treatment reducing hypercapnia

## 2024-05-19 NOTE — ASSESSMENT & PLAN NOTE
Wt Readings from Last 3 Encounters:   05/19/24 64 kg (141 lb 1.5 oz)   05/18/24 61.1 kg (134 lb 12.8 oz)   05/09/24 62.1 kg (137 lb)     Daily weights  Treat with diuretics as needed  No acute component at this time

## 2024-05-19 NOTE — H&P
Formerly [facility]  H&P  Name: Severiano Feliciano 88 y.o. male I MRN: [id number]  Unit/Bed#: ED-29 I Date of Admission: 5/19/2024   Date of Service: 5/19/2024 I Hospital Day: 0      Assessment & Plan   Descending aortic aneurysm (HCC)  Assessment & Plan  Continue outpatient follow-up    * Chronic obstructive pulmonary disease, unspecified COPD type (HCC)  Assessment & Plan  Pt with hx of end-stage COPD with frequent hospital admissions.  Current medication regimen: Budesonide twice daily, Perforomist twice daily, DuoNeb prn.    Recommend palliative care, hospice.  History of tobacco dependence for 40 years 10 cigarette daily. Stopped smoking aprox 30 years ago  Admit to the intensive care unit  Continuous cardiopulmonary monitoring  Continue nebulizer treatments  Hold off on starting systemic steroids at this time  BiPAP as needed     Benign essential hypertension  Assessment & Plan  Continuous cardiopulmonary monitoring  Continue Cozaar  Add as needed hydralazine    History of DVT (deep vein thrombosis)  Assessment & Plan  SCDs  Subcutaneous heparin for DVT prophylaxis    Respiratory failure with hypoxia and hypercapnia (HCC)  Assessment & Plan  Secondary to severe COPD  Continue BiPAP as needed  O2 via nasal cannula while awake  Continue updraft nebulizer treatments    Encephalopathy acute  Assessment & Plan  Likely secondary to hypoxia and hypercapnia  Resolving with treatment reducing hypercapnia    Chronic respiratory failure with hypoxia and hypercapnia (HCC)  Assessment & Plan  Continuous cardiopulmonary monitoring  BiPAP at night and at rest  On chronic oxygen at home 2 to 3 L by nasal cannula    Hyperlipidemia  Assessment & Plan  Add statin    Acute on chronic diastolic CHF (congestive heart failure) (HCC)  Assessment & Plan  Wt Readings from Last 3 Encounters:   05/19/24 64 kg (141 lb 1.5 oz)   05/18/24 61.1 kg (134 lb 12.8 oz)   05/09/24 62.1 kg (137 lb)     Daily weights  Diuretics  as needed  Consider repeat echocardiogram  Consider cardiology consult                 History of Present Illness     HPI: Severiano Feliciano is a 88 y.o. man with a known past medical history significant for end-stage COPD, diastolic congestive heart failure, hypertension, history of DVT and hyperlipidemia.  Patient has not felt well recently and presented to [facility] on 18 May 2024 for evaluation.  He was discharged home from the emergency department there.  This morning the patient's son checked on his mother and father.  His mother informed him that his father was in bed not feeling well.  When the son went to check on him he was unable to speak to him and had rigors..  The patient's son activated EMS.    Emergency medical services applied CPAP and transported the patient to [facility] emergency department for further evaluation and treatment.  Upon arrival to the emergency department patient was placed on BiPAP and serial ABGs were acquired.  Patient demonstrated significant compensated respiratory acidosis.  He will be admitted to the intensive care unit.    History obtained from chart and emergency department personnel.  Review of Systems   Reason unable to perform ROS: Patient is encephalopathic.   Psychiatric/Behavioral:  Positive for agitation.      Disposition: Critical care  Historical Information   Past Medical History:  06/13/2020: Acute metabolic encephalopathy  04/04/2023: Age-related cataract of right eye      Comment:  patient is medically cleared and presents with low risk                of complication with this upcoming R cataract surgery.  No date: Anemia  No date: Aneurysm, aorta, thoracic (HCC)  No date: Appendicolith  No date: Ascending aortic aneurysm ([facility])      Comment:  3.7  No date: Asthma  No date: BPH (benign prostatic hyperplasia)  No date: CAD (coronary artery disease)      Comment:  noted on CT scan  No date: CHF (congestive heart failure) ([facility])  No  date: COPD (chronic obstructive pulmonary disease) ([facility])  No date: Descending thoracic aortic aneurysm ([facility])  No date: Diabetes mellitus ([facility])  No date: Diverticulosis  No date: Former tobacco use  No date: GERD (gastroesophageal reflux disease)  No date: History of DVT (deep vein thrombosis)      Comment:  Left leg  No date: History of transfusion  No date: Hypertension  04/30/2023: Hypoxemia  No date: Inguinal hernia      Comment:  right  No date: Nephrolithiasis  No date: Oxygen dependent      Comment:  2LNC  No date: Oxygen dependent  No date: Pneumonia  No date: Pre-diabetes  No date: Prostate calculus  No date: PVD (peripheral vascular disease) ([facility])  01/04/2023: Recurrent right inguinal hernia w incarcertion  11/19/2018: Thoracic aortic aneurysm without rupture ([facility])      Comment:  Added automatically from request for surgery [id number]  12/29/2018: Thrombocytopenia ([facility])  No date: Ulcer  No date: 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[facility])  No date: Varicose vein of leg      Comment:  b/l Past Surgical History:  No date: CARDIAC SURGERY  No date: ESOPHAGOGASTRODUODENOSCOPY  1/21/2019: HERNIA REPAIR; Right      Comment:  Procedure: REPAIR HERNIA INGUINAL WITH MESH;  Surgeon:                David Lowry MD;  Location:  MAIN OR;  Service:                General  7/22/2023: HERNIA REPAIR; Right      Comment:  Procedure: REPAIR RECURRENT INCARERATED HERNIA INGUINAL                OPEN, RIGHT ORCHIECTOMY;  Surgeon: Mason Bertrand MD;                 Location: [facility];  Service: General  No date: INGUINAL HERNIA REPAIR; Bilateral  12/27/2018: IR TEVAR  12/27/2018: CT EVASC RPR DTA COVERAGE ART ORIGIN 1ST ENDOPROSTH; N/A      Comment:  Procedure: TEVAR - endovascular thoracic aortic aneurysm               repair;  Surgeon: Gladis Ortega MD;  Location: [facility]                OR;  Service: Vascular  12/27/2018: THORACIC AORTIC ANEURYSM REPAIR  No date: VARICOSE VEIN SURGERY; Bilateral      Comment:   vein stripping   Current Outpatient Medications   Medication Instructions    acetaminophen (TYLENOL) 650 mg, Oral, Every 8 hours PRN    albuterol (PROVENTIL HFA,VENTOLIN HFA) 90 mcg/act inhaler 2 puffs, Inhalation, Every 6 hours PRN    brimonidine tartrate 0.2 % ophthalmic solution INSTILL 1 DROP IN LEFT EYE TWICE DAILY INSTILL 1 DROP IN LEFT EYE DOS VECES AL EULALIO    budesonide (PULMICORT) 0.5 mg, Nebulization, 2 times daily, Rinse mouth after use.    ciprofloxacin (CILOXAN) 0.3 % ophthalmic solution 3 drops both ears qhs for 3 days    formoterol (PERFOROMIST) 20 mcg, Nebulization, 2 times daily    ipratropium-albuterol (DUO-NEB) 0.5-2.5 mg/3 mL nebulizer solution 3 mL, Nebulization, 2 times daily    losartan (COZAAR) 25 mg tablet TAKE ONE TABLET BY MOUTH TWICE DAILYTOMAR 1 TABLETA POR VIA ORAL DOS VECES AL EULALIO    mometasone (ELOCON) 0.1 % lotion Topical, Daily    pantoprazole (PROTONIX) 40 mg, Oral, Daily    senna-docusate sodium (SENOKOT S) 8.6-50 mg per tablet 1 tablet, Oral, Daily    Allergies   Allergen Reactions    Penicillins Hives, Itching and Rash    Lisinopril Rash     Side pains and rash     Zyprexa [Olanzapine] Tongue Swelling      Social History     Tobacco Use    Smoking status: Former     Current packs/day: 0.00     Average packs/day: 1 pack/day for 35.0 years (35.0 ttl pk-yrs)     Types: Cigarettes     Start date:      Quit date:      Years since quittin.3    Smokeless tobacco: Never   Vaping Use    Vaping status: Never Used   Substance Use Topics    Alcohol use: Never    Drug use: No    Family History   Problem Relation Age of Onset    Tuberculosis Mother     No Known Problems Father     Cancer Sister     Diabetes Family     Hypertension Family           Objective                            Vitals I/O      Most Recent Min/Max in 24hrs   Temp 99.5 °F (37.5 °C) Temp  Min: 99.5 °F (37.5 °C)  Max: 100.8 °F (38.2 °C)   Pulse 74 Pulse  Min: 72  Max: 96   Resp 18 Resp  Min: 17  Max: 24   BP  141/60 BP  Min: 118/55  Max: 185/79   O2 Sat 96 % SpO2  Min: 92 %  Max: 99 %    No intake or output data in the 24 hours ending 05/19/24 1354    Diet NPO    Invasive Monitoring           Physical Exam   Physical Exam  Eyes:      Extraocular Movements: Extraocular movements intact.      Pupils: Pupils are equal, round, and reactive to light.   Skin:     General: Skin is warm and dry.   HENT:      Head: Normocephalic and atraumatic.      Mouth/Throat:      Mouth: Mucous membranes are dry.   Cardiovascular:      Rate and Rhythm: Regular rhythm. Tachycardia present.      Pulses: Normal pulses.   Musculoskeletal:         General: Normal range of motion.   Abdominal: General: Bowel sounds are normal.      Palpations: Abdomen is soft.   Constitutional:       Appearance: He is ill-appearing.   Pulmonary:      Effort: Pulmonary effort is normal.      Comments: Distant breath sounds  Neurological:      Mental Status: He is alert. Mental status is at baseline. He is agitated.      Motor: Strength full and intact in all extremities.        Corneal reflex present, cough reflex and gag reflex intact.            Diagnostic Studies      EKG: Normal sinus rhythm  Imaging: CT of the chest abdomen pelvis demonstrated: Bilateral nonobstructing renal calculi.     2.  Thoracic aortic aneurysm, unchanged since CTs dating back to 2020.     3.  Emphysema.     4.  Scattered subsegmental atelectasis in both lungs.     5.  Cholelithiasis without evidence of acute cholecystitis.     6.  Colonic diverticulosis without evidence of acute diverticulitis.    CT of the head no contrast:No acute intracranial abnormality.  Chronic microangiopathic changes.  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       Chest x-ray done 18 May 2024: Read as mild pulmonary venous congestion   I have personally reviewed pertinent reports.       Medications:  Scheduled PRN   atorvastatin, 20 mg, Daily With Dinner  budesonide,  0.5 mg, Q12H  chlorhexidine, 15 mL, Q12H KIKE  formoterol, 20 mcg, Q12H  heparin (porcine), 5,000 Units, Q8H KIKE  losartan, 25 mg, Daily  [START ON 5/20/2024] pantoprazole, 40 mg, Early Morning  senna, 1 tablet, HS      acetaminophen, 975 mg, Q6H PRN  levalbuterol, 1.25 mg, Q8H PRN       Continuous          Labs:    CBC    Recent Labs     05/18/24 1856 05/19/24  0801   WBC 7.65 7.63   HGB 11.2* 11.0*   HCT 40.5 39.0   * 124*     BMP    Recent Labs     05/18/24 1856 05/19/24  0801   SODIUM 144 144   K 4.4 4.1   CL 98 99   CO2 44* >45*   AGAP 2*  --    BUN 21 18   CREATININE 0.89 0.84   CALCIUM 9.1 9.2       Coags    Recent Labs     05/19/24  0801   INR 1.07   PTT 29        Additional Electrolytes  No recent results       Blood Gas    Recent Labs     05/19/24  1203   PHART 7.314*   HJP0OSM 85.1*   PO2ART 85.5   UUH0BEX 42.3*   BEART 12.9   SOURCE Radial, Left     Recent Labs     05/19/24  0800 05/19/24  0812 05/19/24  1203   PHVEN 7.227*  --   --    UST1ERN 103.4*  --   --    PO2VEN 28.5*  --   --    YTS4XZN 42.0*  --   --    BEVEN 10.8  --   --    B7HXSCS 47.7*  --   --    SOURCE  --    < > Radial, Left    < > = values in this interval not displayed.    LFTs  Recent Labs     05/18/24 1856 05/19/24  0801   ALT 34 35   AST 38 32   ALKPHOS 66 70   ALB 3.6 3.8   TBILI 0.47 0.55       Infectious  Recent Labs     05/19/24  0801   PROCALCITONI 0.13     Glucose  Recent Labs     05/18/24 1856 05/19/24  0801   GLUC 183* 108             Anticipated Length of Stay is > 2 midnights  RAQUEL Cooley

## 2024-05-20 LAB
ANION GAP SERPL CALCULATED.3IONS-SCNC: 2 MMOL/L (ref 4–13)
ARTERIAL PATENCY WRIST A: YES
ARTERIAL PATENCY WRIST A: YES
BASE EXCESS BLDA CALC-SCNC: 8.3 MMOL/L
BASE EXCESS BLDA CALC-SCNC: 8.8 MMOL/L
BASOPHILS # BLD AUTO: 0.01 THOUSANDS/ÂΜL (ref 0–0.1)
BASOPHILS NFR BLD AUTO: 0 % (ref 0–1)
BUN SERPL-MCNC: 27 MG/DL (ref 5–25)
CALCIUM SERPL-MCNC: 8.8 MG/DL (ref 8.4–10.2)
CHLORIDE SERPL-SCNC: 98 MMOL/L (ref 96–108)
CO2 SERPL-SCNC: 40 MMOL/L (ref 21–32)
CREAT SERPL-MCNC: 0.89 MG/DL (ref 0.6–1.3)
EOSINOPHIL # BLD AUTO: 0 THOUSAND/ÂΜL (ref 0–0.61)
EOSINOPHIL NFR BLD AUTO: 0 % (ref 0–6)
ERYTHROCYTE [DISTWIDTH] IN BLOOD BY AUTOMATED COUNT: 12.9 % (ref 11.6–15.1)
GFR SERPL CREATININE-BSD FRML MDRD: 77 ML/MIN/1.73SQ M
GLUCOSE SERPL-MCNC: 253 MG/DL (ref 65–140)
HCO3 BLDA-SCNC: 36.6 MMOL/L (ref 22–28)
HCO3 BLDA-SCNC: 36.8 MMOL/L (ref 22–28)
HCT VFR BLD AUTO: 37.4 % (ref 36.5–49.3)
HGB BLD-MCNC: 11.1 G/DL (ref 12–17)
IMM GRANULOCYTES # BLD AUTO: 0.01 THOUSAND/UL (ref 0–0.2)
IMM GRANULOCYTES NFR BLD AUTO: 0 % (ref 0–2)
LYMPHOCYTES # BLD AUTO: 0.49 THOUSANDS/ÂΜL (ref 0.6–4.47)
LYMPHOCYTES NFR BLD AUTO: 11 % (ref 14–44)
MAGNESIUM SERPL-MCNC: 2.2 MG/DL (ref 1.9–2.7)
MCH RBC QN AUTO: 31.6 PG (ref 26.8–34.3)
MCHC RBC AUTO-ENTMCNC: 29.7 G/DL (ref 31.4–37.4)
MCV RBC AUTO: 107 FL (ref 82–98)
MONOCYTES # BLD AUTO: 0.14 THOUSAND/ÂΜL (ref 0.17–1.22)
MONOCYTES NFR BLD AUTO: 3 % (ref 4–12)
NASAL CANNULA: 4
NASAL CANNULA: 4
NEUTROPHILS # BLD AUTO: 3.76 THOUSANDS/ÂΜL (ref 1.85–7.62)
NEUTS SEG NFR BLD AUTO: 86 % (ref 43–75)
NRBC BLD AUTO-RTO: 0 /100 WBCS
O2 CT BLDA-SCNC: 15.8 ML/DL (ref 16–23)
O2 CT BLDA-SCNC: 16.2 ML/DL (ref 16–23)
OXYHGB MFR BLDA: 95.3 % (ref 94–97)
OXYHGB MFR BLDA: 96.4 % (ref 94–97)
PCO2 BLDA: 68.4 MM HG (ref 36–44)
PCO2 BLDA: 73.6 MM HG (ref 36–44)
PH BLDA: 7.32 [PH] (ref 7.35–7.45)
PH BLDA: 7.35 [PH] (ref 7.35–7.45)
PHOSPHATE SERPL-MCNC: 2.7 MG/DL (ref 2.3–4.1)
PLATELET # BLD AUTO: 134 THOUSANDS/UL (ref 149–390)
PMV BLD AUTO: 9.4 FL (ref 8.9–12.7)
PO2 BLDA: 84 MM HG (ref 75–129)
PO2 BLDA: 93.8 MM HG (ref 75–129)
POTASSIUM SERPL-SCNC: 3.9 MMOL/L (ref 3.5–5.3)
RBC # BLD AUTO: 3.51 MILLION/UL (ref 3.88–5.62)
SODIUM SERPL-SCNC: 140 MMOL/L (ref 135–147)
SPECIMEN SOURCE: ABNORMAL
SPECIMEN SOURCE: ABNORMAL
WBC # BLD AUTO: 4.41 THOUSAND/UL (ref 4.31–10.16)

## 2024-05-20 PROCEDURE — 83735 ASSAY OF MAGNESIUM: CPT

## 2024-05-20 PROCEDURE — 82805 BLOOD GASES W/O2 SATURATION: CPT

## 2024-05-20 PROCEDURE — 94640 AIRWAY INHALATION TREATMENT: CPT

## 2024-05-20 PROCEDURE — 99232 SBSQ HOSP IP/OBS MODERATE 35: CPT | Performed by: INTERNAL MEDICINE

## 2024-05-20 PROCEDURE — 94660 CPAP INITIATION&MGMT: CPT

## 2024-05-20 PROCEDURE — 94760 N-INVAS EAR/PLS OXIMETRY 1: CPT

## 2024-05-20 PROCEDURE — 36600 WITHDRAWAL OF ARTERIAL BLOOD: CPT

## 2024-05-20 PROCEDURE — 94664 DEMO&/EVAL PT USE INHALER: CPT

## 2024-05-20 PROCEDURE — 85025 COMPLETE CBC W/AUTO DIFF WBC: CPT

## 2024-05-20 PROCEDURE — 80048 BASIC METABOLIC PNL TOTAL CA: CPT

## 2024-05-20 PROCEDURE — 84100 ASSAY OF PHOSPHORUS: CPT

## 2024-05-20 RX ORDER — MAGNESIUM HYDROXIDE/ALUMINUM HYDROXICE/SIMETHICONE 120; 1200; 1200 MG/30ML; MG/30ML; MG/30ML
30 SUSPENSION ORAL EVERY 4 HOURS PRN
Status: DISCONTINUED | OUTPATIENT
Start: 2024-05-20 | End: 2024-05-23 | Stop reason: HOSPADM

## 2024-05-20 RX ORDER — LABETALOL HYDROCHLORIDE 5 MG/ML
10 INJECTION, SOLUTION INTRAVENOUS ONCE
Status: COMPLETED | OUTPATIENT
Start: 2024-05-20 | End: 2024-05-20

## 2024-05-20 RX ORDER — LANOLIN ALCOHOL/MO/W.PET/CERES
3 CREAM (GRAM) TOPICAL
Status: DISCONTINUED | OUTPATIENT
Start: 2024-05-20 | End: 2024-05-23 | Stop reason: HOSPADM

## 2024-05-20 RX ORDER — LABETALOL HYDROCHLORIDE 5 MG/ML
10 INJECTION, SOLUTION INTRAVENOUS EVERY 4 HOURS PRN
Status: DISCONTINUED | OUTPATIENT
Start: 2024-05-20 | End: 2024-05-20

## 2024-05-20 RX ORDER — HYDRALAZINE HYDROCHLORIDE 20 MG/ML
5 INJECTION INTRAMUSCULAR; INTRAVENOUS EVERY 6 HOURS PRN
Status: DISCONTINUED | OUTPATIENT
Start: 2024-05-20 | End: 2024-05-20

## 2024-05-20 RX ORDER — POLYETHYLENE GLYCOL 3350 17 G/17G
17 POWDER, FOR SOLUTION ORAL DAILY PRN
Status: DISCONTINUED | OUTPATIENT
Start: 2024-05-20 | End: 2024-05-23 | Stop reason: HOSPADM

## 2024-05-20 RX ORDER — LORAZEPAM 2 MG/ML
0.25 INJECTION INTRAMUSCULAR ONCE
Status: COMPLETED | OUTPATIENT
Start: 2024-05-20 | End: 2024-05-20

## 2024-05-20 RX ORDER — HYDRALAZINE HYDROCHLORIDE 20 MG/ML
10 INJECTION INTRAMUSCULAR; INTRAVENOUS EVERY 6 HOURS PRN
Status: DISCONTINUED | OUTPATIENT
Start: 2024-05-20 | End: 2024-05-23 | Stop reason: HOSPADM

## 2024-05-20 RX ORDER — LOSARTAN POTASSIUM 25 MG/1
25 TABLET ORAL 2 TIMES DAILY
Status: DISCONTINUED | OUTPATIENT
Start: 2024-05-20 | End: 2024-05-23 | Stop reason: HOSPADM

## 2024-05-20 RX ADMIN — CHLORHEXIDINE GLUCONATE 15 ML: 1.2 RINSE ORAL at 08:00

## 2024-05-20 RX ADMIN — PANTOPRAZOLE SODIUM 40 MG: 40 TABLET, DELAYED RELEASE ORAL at 05:02

## 2024-05-20 RX ADMIN — LOSARTAN POTASSIUM 25 MG: 25 TABLET, FILM COATED ORAL at 08:00

## 2024-05-20 RX ADMIN — LEVALBUTEROL HYDROCHLORIDE 1.25 MG: 1.25 SOLUTION RESPIRATORY (INHALATION) at 21:25

## 2024-05-20 RX ADMIN — BUDESONIDE 0.5 MG: 0.5 INHALANT RESPIRATORY (INHALATION) at 07:20

## 2024-05-20 RX ADMIN — BUDESONIDE 0.5 MG: 0.5 INHALANT RESPIRATORY (INHALATION) at 21:25

## 2024-05-20 RX ADMIN — FORMOTEROL FUMARATE 20 MCG: 20 SOLUTION RESPIRATORY (INHALATION) at 21:25

## 2024-05-20 RX ADMIN — LABETALOL HYDROCHLORIDE 10 MG: 5 INJECTION, SOLUTION INTRAVENOUS at 02:15

## 2024-05-20 RX ADMIN — SENNOSIDES 8.6 MG: 8.6 TABLET, FILM COATED ORAL at 21:48

## 2024-05-20 RX ADMIN — LABETALOL HYDROCHLORIDE 10 MG: 5 INJECTION, SOLUTION INTRAVENOUS at 10:05

## 2024-05-20 RX ADMIN — ATORVASTATIN CALCIUM 20 MG: 20 TABLET, FILM COATED ORAL at 15:43

## 2024-05-20 RX ADMIN — FORMOTEROL FUMARATE 20 MCG: 20 SOLUTION RESPIRATORY (INHALATION) at 07:20

## 2024-05-20 RX ADMIN — LABETALOL HYDROCHLORIDE 10 MG: 5 INJECTION, SOLUTION INTRAVENOUS at 00:19

## 2024-05-20 RX ADMIN — LORAZEPAM 0.25 MG: 2 INJECTION INTRAMUSCULAR; INTRAVENOUS at 23:11

## 2024-05-20 RX ADMIN — METHYLPREDNISOLONE SODIUM SUCCINATE 40 MG: 40 INJECTION, POWDER, FOR SOLUTION INTRAMUSCULAR; INTRAVENOUS at 08:00

## 2024-05-20 RX ADMIN — HEPARIN SODIUM 5000 UNITS: 5000 INJECTION INTRAVENOUS; SUBCUTANEOUS at 13:11

## 2024-05-20 RX ADMIN — HEPARIN SODIUM 5000 UNITS: 5000 INJECTION INTRAVENOUS; SUBCUTANEOUS at 05:02

## 2024-05-20 RX ADMIN — LOSARTAN POTASSIUM 25 MG: 25 TABLET, FILM COATED ORAL at 21:48

## 2024-05-20 RX ADMIN — MELATONIN 3 MG: 3 TAB ORAL at 23:11

## 2024-05-20 RX ADMIN — CHLORHEXIDINE GLUCONATE 15 ML: 1.2 RINSE ORAL at 21:48

## 2024-05-20 RX ADMIN — HEPARIN SODIUM 5000 UNITS: 5000 INJECTION INTRAVENOUS; SUBCUTANEOUS at 21:48

## 2024-05-20 RX ADMIN — LORAZEPAM 0.25 MG: 2 INJECTION INTRAMUSCULAR; INTRAVENOUS at 13:11

## 2024-05-20 NOTE — ASSESSMENT & PLAN NOTE
Pt with hx of end-stage COPD with frequent hospital admissions.  Current medication regimen: Budesonide twice daily, Perforomist twice daily, Solu-Medrol twice daily, DuoNeb prn.    Recommend palliative care, hospice.  History of tobacco dependence for 40 years 10 cigarette daily. Stopped smoking aprox 30 years ago  Continuous cardiopulmonary monitoring    Plan:  Continue current regimen  BiPAP as needed

## 2024-05-20 NOTE — ASSESSMENT & PLAN NOTE
Serial ABGs indicates compensated respiratory failure.  Baseline O2 at home 2 to 3 L NC.    Plan:  BiPAP at bedtime and as needed  Monitor vitals closely

## 2024-05-20 NOTE — UTILIZATION REVIEW
Initial Clinical Review    Admission: Date/Time/Statement:   Admission Orders (From admission, onward)       Ordered        05/19/24 1243  Inpatient Admission  Once                          Orders Placed This Encounter   Procedures    Inpatient Admission     Standing Status:   Standing     Number of Occurrences:   1     Order Specific Question:   Level of Care     Answer:   Level 1 Stepdown [13]     Order Specific Question:   Estimated length of stay     Answer:   More than 2 Midnights     Order Specific Question:   Certification     Answer:   I certify that inpatient services are medically necessary for this patient for a duration of greater than two midnights. See H&P and MD Progress Notes for additional information about the patient's course of treatment.     ED Arrival Information       Expected   -    Arrival   5/19/2024 07:39    Acuity   Emergent              Means of arrival   Ambulance    Escorted by   [facility] ([facility])    Service   Critical Care/ICU    Admission type   Emergency              Arrival complaint   SOB/Altered Mental Status             Chief Complaint   Patient presents with    Shortness of Breath     Patient arrives via [facility] with reports of SOB and AMS.        Initial Presentation: 88 y.o. male presents to ED  via  EMS after son found him unable to speak, had rigors. PMH is  end stage  COPD,  on  home  O2  2-3L  NC, BiPap at night,    40  yr  tobacco use history,  CHF, HTN, DVT and seen in ED  on  5/18 after not feeling well and discharged home.  CPAP  applied  by EMS Placed on  BiPap in ED.  ABG's  revealed significant  respiratory acidosis.  Encephalopathic   on exam.  Admit   ICU LOC  IP with  respiratory failure  with hypoxia and hypercapnia,  COPD, Acute/chronic  CHF  and Acute  Encephalopathy and plan is    nebulizers, hold on  steroids for now, BiPap,  possible cardiology consult, daily weight,   and continue current meds.        Date:   5/20       Day 2:   Unable to  answer questions  due to lethargy.  Remains on  O2 3l NC.  Not following instructions.  Continue current meds.  Transferred out of  ICU  5/20.      Date:   5/21  Day 3: Has surpassed a 2nd midnight with active treatments and services.   Confused overnight, somewhat  able to be redirected by family.  Not opening eyes, will not  say  name,  will  only  answer yes or no this am.  States he is at home.  Remains on  O2  5L  NC.   Decreased breath sounds  B/L. Continue current meds.    ED Triage Vitals   Temperature Pulse Respirations Blood Pressure SpO2   05/19/24 0747 05/19/24 0747 05/19/24 0747 05/19/24 0747 05/19/24 0747   (!) 100.8 °F (38.2 °C) 96 (!) 24 (!) 185/79 96 %      Temp Source Heart Rate Source Patient Position - Orthostatic VS BP Location FiO2 (%)   05/19/24 0747 05/19/24 0747 05/19/24 0747 05/19/24 0747 05/19/24 1907   Oral Monitor Sitting Left arm 40      Pain Score       05/19/24 1616       No Pain          Wt Readings from Last 1 Encounters:   05/20/24 62.3 kg (137 lb 5.6 oz)     Additional Vital Signs:   Date/Time Temp Pulse Resp BP MAP (mmHg) SpO2 FiO2 (%) Calculated FIO2 (%) - Nasal Cannula O2 Flow Rate (L/min) Nasal Cannula O2 Flow Rate (L/min) O2 Device O2 Interface Device Patient Position - Orthostatic VS   05/20/24 1200 97.9 °F (36.6 °C) 68 24 Abnormal  138/62 89 95 % -- 36 -- 4 L/min Nasal cannula -- Lying   05/20/24 1130 -- 68 -- -- -- 96 % -- -- -- -- -- -- --   05/20/24 1115 -- -- -- -- -- -- -- 36 -- 4 L/min Nasal cannula  -- --   O2 Device: pt refusing BIPAP; provider aware at 05/20/24 1115   05/20/24 1100 -- 62 28 Abnormal  165/80 125 95 % -- -- -- -- -- -- --   05/20/24 1033 -- 70 22 172/91 Abnormal  143 95 % -- -- -- -- BiPAP Full face mask --   05/20/24 1000 -- 70 25 Abnormal  181/90 Abnormal  114 95 % -- -- -- -- -- -- --   05/20/24 0900 -- 66 30 Abnormal  113/80 94 94 % -- 36 -- 4 L/min -- -- --   05/20/24 0800 97.5 °F (36.4 °C) 62 23 Abnormal  180/74 Abnormal  101 98 % -- 28 -- 2  L/min Nasal cannula -- Lying   05/20/24 0755 -- -- -- -- -- 98 % -- -- -- -- -- -- --   05/20/24 0721 -- 60 23 Abnormal  156/81 100 97 % -- -- -- -- -- -- --   05/20/24 0700 -- 60 25 Abnormal  -- -- 100 % -- -- -- -- -- -- --   05/20/24 0607 -- 62 29 Abnormal  128/64 92 99 % -- -- -- -- -- -- --   05/20/24 0507 -- 64 26 Abnormal  156/66 100 99 % -- -- -- -- -- -- --   05/20/24 0407 -- 66 24 Abnormal  148/86 115 95 % -- -- -- -- -- -- --   05/20/24 0349 97.4 °F (36.3 °C) Abnormal  -- -- -- -- -- -- -- -- -- -- -- --   05/20/24 0307 -- 60 33 Abnormal  168/86 115 91 % -- -- -- -- -- -- --   05/20/24 0207 -- 64 15 212/126 Abnormal  158 94 % -- 40 -- 5 L/min Nasal cannula -- --   05/20/24 0134 -- -- -- -- -- -- 40 -- -- -- BiPAP -- --   05/20/24 0107 -- 68 18 150/80 111 91 % -- -- -- -- -- -- --   05/20/24 0007 -- 80 33 Abnormal  191/87 Abnormal  136 93 % -- -- -- -- -- -- --   05/19/24 2307 -- 84 33 Abnormal  174/73 Abnormal  107 93 % -- -- -- -- -- -- --   05/19/24 2254 -- -- -- -- -- -- -- 40 -- 5 L/min Nasal cannula  -- --   O2 Device: refusing to wear bipap at 05/19/24 2254 05/19/24 2207 -- 86 31 Abnormal  193/100 Abnormal  154 93 % -- -- -- -- -- -- --   05/19/24 2107 -- 72 20 141/62 93 95 % -- -- -- -- -- -- --   05/19/24 2023 -- -- -- -- -- 96 % -- -- -- -- -- Full face mask --   05/19/24 2008 -- -- -- -- -- 96 % -- -- -- -- -- -- --   05/19/24 1907 -- 60 10 Abnormal  125/70 93 92 % 40 -- -- -- BiPAP -- Lying   05/19/24 1900 98.4 °F (36.9 °C) -- -- -- -- -- -- -- -- -- -- -- --   05/19/24 1850 -- 62 17 153/75 83 92 % -- -- -- -- -- -- --   05/19/24 1840 -- 64 14 127/60 90 92 % -- -- -- -- -- -- --   05/19/24 1800 98.1 °F (36.7 °C) 74 15 -- -- 96 % -- -- -- -- BiPAP -- --   05/19/24 1700 -- 68 31 Abnormal  -- -- 96 % -- -- -- -- -- -- --   05/19/24 1600 -- 76 28 Abnormal  -- -- 96 % -- -- -- -- -- -- --   05/19/24 1526 97.7 °F (36.5 °C) -- -- -- -- -- -- -- -- -- -- --      Pertinent Labs/Diagnostic Test  Results:   EKG      SR    with SA  CT head without contrast   Final Result by Mason Greenberg MD (05/19 1008)      No acute intracranial abnormality.  Chronic microangiopathic changes.   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               Workstation performed: [id number]         CT chest abdomen pelvis w contrast   Final Result by Francisco Guerrier MD (05/19 1148)      1.  Bilateral nonobstructing renal calculi.      2.  Thoracic aortic aneurysm, unchanged since CTs dating back to 2020.      3.  Emphysema.      4.  Scattered subsegmental atelectasis in both lungs.      5.  Cholelithiasis without evidence of acute cholecystitis.      6.  Colonic diverticulosis without evidence of acute diverticulitis.               Workstation performed: [id number]           Results from last 7 days   Lab Units 05/18/24  1843   SARS-COV-2  Negative     Results from last 7 days   Lab Units 05/20/24  0424 05/19/24  1610 05/19/24  0801 05/18/24  1856   WBC Thousand/uL 4.41  --  7.63 7.65   HEMOGLOBIN g/dL 11.1*  --  11.0* 11.2*   HEMATOCRIT % 37.4  --  39.0 40.5   PLATELETS Thousands/uL 134* 115* 124* 128*   TOTAL NEUT ABS Thousands/µL 3.76  --  5.23 5.59         Results from last 7 days   Lab Units 05/20/24  0424 05/19/24  0801 05/18/24  1856   SODIUM mmol/L 140 144 144   POTASSIUM mmol/L 3.9 4.1 4.4   CHLORIDE mmol/L 98 99 98   CO2 mmol/L 40* >45* 44*   ANION GAP mmol/L 2*  --  2*   BUN mg/dL 27* 18 21   CREATININE mg/dL 0.89 0.84 0.89   EGFR ml/min/1.73sq m 77 79 77   CALCIUM mg/dL 8.8 9.2 9.1   MAGNESIUM mg/dL 2.2  --   --    PHOSPHORUS mg/dL 2.7  --   --      Results from last 7 days   Lab Units 05/19/24  0801 05/18/24  1856   AST U/L 32 38   ALT U/L 35 34   ALK PHOS U/L 70 66   TOTAL PROTEIN g/dL 6.6 6.6   ALBUMIN g/dL 3.8 3.6   TOTAL BILIRUBIN mg/dL 0.55 0.47     Results from last 7 days   Lab Units 05/19/24  0756   POC GLUCOSE mg/dl 101     Results from last 7 days   Lab  Units 05/20/24  0424 05/19/24  0801 05/18/24  1856   GLUCOSE RANDOM mg/dL 253* 108 183*      Results from last 7 days   Lab Units 05/20/24  1216 05/20/24  0955 05/19/24  1203   PH ART  7.346* 7.317* 7.314*   PCO2 ART mm Hg 68.4* 73.6* 85.1*   PO2 ART mm Hg 93.8 84.0 85.5   HCO3 ART mmol/L 36.6* 36.8* 42.3*   BASE EXC ART mmol/L 8.8 8.3 12.9   O2 CONTENT ART mL/dL 15.8* 16.2 15.5*   O2 HGB, ARTERIAL % 96.4 95.3 95.3   ABG SOURCE  Brachial, Left Brachial, Left Radial, Left     Results from last 7 days   Lab Units 05/19/24  2009 05/19/24  0800   PH DUSTIN  7.313 7.227*   PCO2 DUSTIN mm Hg 70.1* 103.4*   PO2 DUSTIN mm Hg 45.7* 28.5*   HCO3 DUSTIN mmol/L 34.7* 42.0*   BASE EXC DUSTIN mmol/L 6.7 10.8   O2 CONTENT DUSTIN ml/dL 12.3 7.9   O2 HGB, VENOUS % 80.2* 47.7*                     Results from last 7 days   Lab Units 05/19/24  0801   PROTIME seconds 14.2   INR  1.07   PTT seconds 29         Results from last 7 days   Lab Units 05/19/24  0801   PROCALCITONIN ng/ml 0.13     Results from last 7 days   Lab Units 05/19/24  0801   LACTIC ACID mmol/L 0.5               Results from last 7 days   Lab Units 05/18/24  1843   INFLUENZA A PCR  Negative   INFLUENZA B PCR  Negative   RSV PCR  Negative                             Results from last 7 days   Lab Units 05/19/24  0844 05/19/24  0800   BLOOD CULTURE  No Growth at 24 hrs. No Growth at 24 hrs.                   ED Treatment:   Medication Administration from 05/19/2024 0739 to 05/19/2024 1501         Date/Time Order Dose Route Action Comments     05/19/2024 0747 EDT ipratropium-albuterol (FOR EMS ONLY) (DUO-NEB) 0.5-2.5 mg/3 mL inhalation solution 3 mL 0 mL Does not apply Given to EMS --     05/19/2024 0938 EDT iohexol (OMNIPAQUE) 350 MG/ML injection (MULTI-DOSE) 100 mL 100 mL Intravenous Given --            Present on Admission:   Chronic obstructive pulmonary disease, unspecified COPD type (HCC)   Descending aortic aneurysm (HCC)   Benign essential hypertension   Hyperlipidemia   Chronic  respiratory failure with hypoxia and hypercapnia (HCC)      Admitting Diagnosis: Emphysema of lung (HCC) [J43.9]  Cholelithiasis [K80.20]  Atelectasis [J98.11]  Diverticulosis [K57.90]  Hypercarbia [R06.89]  Renal calculi [N20.0]  Altered mental status [R41.82]  SOB (shortness of breath) [R06.02]  Chronic respiratory failure with hypoxia and hypercapnia (HCC) [J96.11, J96.12]  AMS (altered mental status) [R41.82]  Age/Sex: 88 y.o. male  Admission Orders:  Scheduled Medications:  atorvastatin, 20 mg, Oral, Daily With Dinner  budesonide, 0.5 mg, Nebulization, Q12H  chlorhexidine, 15 mL, Mouth/Throat, Q12H KIKE  formoterol, 20 mcg, Nebulization, Q12H  heparin (porcine), 5,000 Units, Subcutaneous, Q8H KIKE  losartan, 25 mg, Oral, BID  pantoprazole, 40 mg, Oral, Early Morning  senna, 1 tablet, Oral, HS      Continuous IV Infusions:     PRN Meds:  acetaminophen, 975 mg, Oral, Q6H PRN  hydrALAZINE, 10 mg, Intravenous, Q6H PRN  levalbuterol, 1.25 mg, Nebulization, Q8H PRN        IP CONSULT TO CASE MANAGEMENT    Network Utilization Review Department  ATTENTION: Please call with any questions or concerns to [phone] and carefully listen to the prompts so that you are directed to the right person. All voicemails are confidential.   For Discharge needs, contact Care Management DC Support Team at [phone] opt. 2  Send all requests for admission clinical reviews, approved or denied determinations and any other requests to dedicated fax number below belonging to the campus where the patient is receiving treatment. List of dedicated fax numbers for the Facilities:  FACILITY NAME UR FAX NUMBER   ADMISSION DENIALS (Administrative/Medical Necessity) [phone]   DISCHARGE SUPPORT TEAM (NETWORK) [phone]   PARENT CHILD HEALTH (Maternity/NICU/Pediatrics) [phone]   [facility] [phone]   [facility] [phone]   [facility] [phone]    [facility] [phone]   [facility] [phone]   [facility] [phone]   [facility] [phone]   [facility] [phone]   [facility] [phone]   [facility] [phone]   [facility] [phone]   [facility] [phone]

## 2024-05-20 NOTE — QUICK NOTE
Patient's family updated via phone with regards to current status and management.  Treatment plan discussed and they are amenable.  All questions answered.    Spoke with patient's daughter with patient's wife present during phone conversation.

## 2024-05-20 NOTE — ASSESSMENT & PLAN NOTE
[address] coding opportunities       Chart reviewed, no opportunity found: [address] Review     Patients Insurance     Medicare Insurance: [employer] Found at home in bed by son, unable to speak and shaking.  Likely secondary to hypoxia and hypercapnia  Resolving with treatment reducing hypercapnia

## 2024-05-20 NOTE — QUICK NOTE
Critical Care Interval Transfer Note:    Please refer to progress note from earlier today for full details.     Barriers to discharge:   None     Consults: IP CONSULT TO CASE MANAGEMENT    Recommended to review admission imaging for incidental findings and document in discharge navigator: Chart reviewed, no known incidental findings noted at this time.      Discharge Plan: Anticipate discharge later today or tomorrow to [facility]    Assessment and plan:    Respiratory failure with hypoxia hypercapnia  -Baseline O2 at home 3 L; BiPAP HS at home  -Baseline pCO2 in the 60s  -Current regimen: Perforomist, Pulmicort, Solu-Medrol twice daily    Plan:  Continue current regimen  BiPAP at bedtime and as needed      Patient seen and evaluated by Critical Care today and deemed to be appropriate for transfer to [facility]. Spoke to Dr. Mojica from [facility] to accept transfer. Critical care can be contacted via [employer] with any questions or concerns.

## 2024-05-20 NOTE — ASSESSMENT & PLAN NOTE
Blood Pressure: 128/64    Continuous cardiopulmonary monitoring  Continue Cozaar  Add as needed hydralazine

## 2024-05-20 NOTE — PROGRESS NOTES
[facility]  Progress Note  Name: Severiano Feliciano I  MRN: [id number]  Unit/Bed#: ICU 06 I Date of Admission: 5/19/2024   Date of Service: 5/20/2024 I Hospital Day: 1    Assessment & Plan   Respiratory failure with hypoxia and hypercapnia (HCC)  Assessment & Plan  Secondary to severe COPD  Continue BiPAP as needed  O2 via nasal cannula while awake  Continue Perforomist, Pulmicort and Solu-Medrol twice daily    Encephalopathy acute  Assessment & Plan  Found at home in bed by son, unable to speak and shaking.  Likely secondary to hypoxia and hypercapnia  Resolving with treatment reducing hypercapnia    Chronic respiratory failure with hypoxia and hypercapnia (HCC)  Assessment & Plan  Serial ABGs indicates compensated respiratory failure.  Baseline O2 at home 2 to 3 L NC.    Plan:  BiPAP at bedtime and as needed  Monitor vitals closely    * Chronic obstructive pulmonary disease, unspecified COPD type (HCC)  Assessment & Plan  Pt with hx of end-stage COPD with frequent hospital admissions.  Current medication regimen: Budesonide twice daily, Perforomist twice daily, Solu-Medrol twice daily, DuoNeb prn.    Recommend palliative care, hospice.  History of tobacco dependence for 40 years 10 cigarette daily. Stopped smoking aprox 30 years ago  Continuous cardiopulmonary monitoring    Plan:  Continue current regimen  BiPAP as needed    Chronic diastolic (congestive) heart failure (HCC)  Assessment & Plan  Wt Readings from Last 3 Encounters:   05/20/24 62.3 kg (137 lb 5.6 oz)   05/18/24 61.1 kg (134 lb 12.8 oz)   05/09/24 62.1 kg (137 lb)   Echo completed on 4/2023: EF 66%.    Plan:  Continue to monitor daily weights  Monitor I/O's    Hyperlipidemia  Assessment & Plan  Current regimen: Lipitor 20 mg daily    Plan:  Continue current regimen    Benign essential hypertension  Assessment & Plan  Blood Pressure: 128/64    Continuous cardiopulmonary monitoring  Continue Cozaar  Add as needed  hydralazine    History of DVT (deep vein thrombosis)  Assessment & Plan  SCDs  Subcutaneous heparin for DVT prophylaxis    Descending aortic aneurysm (HCC)  Assessment & Plan  Continue outpatient follow-up             Disposition: Critical care    ICU Core Measures     A: Assess, Prevent, and Manage Pain Has pain been assessed? NA  Need for changes to pain regimen? NA   B: Both SAT/SAT  N/A   C: Choice of Sedation RASS Goal: -1 Drowsy or 0 Alert and Calm  Need for changes to sedation or analgesia regimen? NA   D: Delirium CAM-ICU: Negative   E: Early Mobility  Plan for early mobility? Yes   F: Family Engagement Plan for family engagement today? Yes         Prophylaxis:  VTE VTE covered by:  heparin (porcine), Subcutaneous, 5,000 Units at 05/20/24 0502       Stress Ulcer  covered bypantoprazole (PROTONIX) 40 mg tablet [[id number]] (Long-Term Med), pantoprazole (PROTONIX) EC tablet 40 mg [[id number]]         Significant 24hr Events     24hr events: No acute events     Subjective   Patient seen and assessed at bedside this morning.  Unable to answer questions due to lethargy.  Remains on nasal cannula and titrate down to baseline at 3 L.  Review of Systems   Unable to perform ROS: Other      Objective                            Vitals I/O      Most Recent Min/Max in 24hrs   Temp (!) 97.4 °F (36.3 °C) Temp  Min: 97.4 °F (36.3 °C)  Max: 100.8 °F (38.2 °C)   Pulse 62 Pulse  Min: 60  Max: 96   Resp (!) 29 Resp  Min: 10  Max: 33   /64 BP  Min: 118/55  Max: 212/126   O2 Sat 99 % SpO2  Min: 91 %  Max: 99 %    No intake or output data in the 24 hours ending 05/20/24 0715    Diet Cardiovascular; Sodium 2 GM    Invasive Monitoring           Physical Exam   Physical Exam  Eyes:      Comments: Unable to perform due to patient's inability to follow instructions.   Skin:     General: Skin is warm.      Capillary Refill: Capillary refill takes 2 to 3 seconds.   HENT:      Head: Normocephalic and atraumatic.      Mouth/Throat:       Mouth: Mucous membranes are dry.   Cardiovascular:      Rate and Rhythm: Normal rate and regular rhythm.      Pulses: Normal pulses.      Heart sounds: Normal heart sounds.   Musculoskeletal:         General: Normal range of motion.   Abdominal:      Palpations: Abdomen is soft.   Constitutional:       General: He is not in acute distress.  Pulmonary:      Effort: Pulmonary effort is normal. No respiratory distress.      Breath sounds: No wheezing or rhonchi.   Neurological:      Mental Status: He is somnolent.      Comments: Lethargic and Unable to assess            Diagnostic Studies      EKG: Sinus rhythm with sinus arrhythmia; completed on 5/19  Imaging: No recent imaging      Medications:  Scheduled PRN   atorvastatin, 20 mg, Daily With Dinner  budesonide, 0.5 mg, Q12H  chlorhexidine, 15 mL, Q12H KIKE  formoterol, 20 mcg, Q12H  heparin (porcine), 5,000 Units, Q8H KIKE  losartan, 25 mg, Daily  methylPREDNISolone sodium succinate, 40 mg, Q12H KIKE  pantoprazole, 40 mg, Early Morning  senna, 1 tablet, HS      acetaminophen, 975 mg, Q6H PRN  labetalol, 10 mg, Q4H PRN  levalbuterol, 1.25 mg, Q8H PRN       Continuous          Labs:    CBC    Recent Labs     05/19/24  0801 05/19/24  1610 05/20/24  0424   WBC 7.63  --  4.41   HGB 11.0*  --  11.1*   HCT 39.0  --  37.4   * 115* 134*     BMP    Recent Labs     05/18/24  1856 05/19/24  0801 05/20/24  0424   SODIUM 144 144 140   K 4.4 4.1 3.9   CL 98 99 98   CO2 44* >45* 40*   AGAP 2*  --  2*   BUN 21 18 27*   CREATININE 0.89 0.84 0.89   CALCIUM 9.1 9.2 8.8       Coags    Recent Labs     05/19/24  0801   INR 1.07   PTT 29        Additional Electrolytes  Recent Labs     05/20/24  0424   MG 2.2   PHOS 2.7          Blood Gas    Recent Labs     05/19/24  1203   PHART 7.314*   YFB4TXA 85.1*   PO2ART 85.5   IDV4WWA 42.3*   BEART 12.9   SOURCE Radial, Left     Recent Labs     05/19/24  1203 05/19/24  2009   PHVEN  --  7.313   TVI2UHW  --  70.1*   PO2VEN  --  45.7*   [id number]   --  34.7*   BEVEN  --  6.7   [id number]  --  80.2*   SOURCE Radial, Left  --     LFTs  Recent Labs     05/18/24  1856 05/19/24  0801   ALT 34 35   AST 38 32   ALKPHOS 66 70   ALB 3.6 3.8   TBILI 0.47 0.55       Infectious  Recent Labs     05/19/24  0801   PROCALCITONI 0.13     Glucose  Recent Labs     05/18/24  1856 05/19/24  0801 05/20/24  0424   GLUC 183* 108 253*               Buster Neville MD

## 2024-05-20 NOTE — UTILIZATION REVIEW
Notification of Unplanned, Urgent, or   Emergency Inpatient Admission   AUTHORIZATION REQUEST   [place] [address]  Tax ID: [id number]  NPI: [id number]  [facility]  Admission Level of Care: Inpatient  Place of Service Code: 21     RN is working on review will send once completed   Attending Physician Information  Attending Name and NPI#: Liliana Mojica Md [[id number]]  Phone: [phone]     Admission Information  Inpatient Admission Date/Time: 5/19/24 12:43 PM  Discharge Date/Time: No discharge date for patient encounter.  Admitting Diagnosis Code/Description:  Emphysema of lung (HCC) [J43.9]  Cholelithiasis [K80.20]  Atelectasis [J98.11]  Diverticulosis [K57.90]  Hypercarbia [R06.89]  Renal calculi [N20.0]  Altered mental status [R41.82]  SOB (shortness of breath) [R06.02]  Chronic respiratory failure with hypoxia and hypercapnia (HCC) [J96.11, J96.12]  AMS (altered mental status) [R41.82]     Utilization Review Contact  Tamika Dominguez, Utilization   Phone: [phone]  Fax: [phone]  Email: [email or web address]  Contact for approvals/pending authorizations, clinical reviews, and discharge.     Physician Advisory Services Contact  Medical Necessity Denial & Jiil-xx-Hlkr Discussion  Phone: [phone]  Fax: [phone]  Email: [email or web address]     DISCHARGE SUPPORT TEAM:  For Patients Discharge Needs & Updates  Phone: [phone] opt. 2 Fax: [phone]  Email: [email or web address]

## 2024-05-20 NOTE — PLAN OF CARE
Problem: Potential for Falls  Goal: Patient will remain free of falls  Description: INTERVENTIONS:  - Educate patient/family on patient safety including physical limitations  - Instruct patient to call for assistance with activity   - Consult OT/PT to assist with strengthening/mobility   - Keep Call bell within reach  - Keep bed low and locked with side rails adjusted as appropriate  - Keep care items and personal belongings within reach  - Initiate and maintain comfort rounds  - Make Fall Risk Sign visible to staff  - Offer Toileting in advance of need  - Initiate/Maintain bed alarm  - Obtain necessary fall risk management equipment  - Apply yellow socks and bracelet for high fall risk patients  - Consider moving patient to room near nurses station  Outcome: Progressing     Problem: PAIN - ADULT  Goal: Verbalizes/displays adequate comfort level or baseline comfort level  Description: Interventions:  - Encourage patient to monitor pain and request assistance  - Assess pain using appropriate pain scale  - Administer analgesics based on type and severity of pain and evaluate response  - Implement non-pharmacological measures as appropriate and evaluate response  - Consider cultural and social influences on pain and pain management  - Notify physician/advanced practitioner if interventions unsuccessful or patient reports new pain  Outcome: Progressing     Problem: SAFETY ADULT  Goal: Patient will remain free of falls  Description: INTERVENTIONS:  - Educate patient/family on patient safety including physical limitations  - Instruct patient to call for assistance with activity   - Consult OT/PT to assist with strengthening/mobility   - Keep Call bell within reach  - Keep bed low and locked with side rails adjusted as appropriate  - Keep care items and personal belongings within reach  - Initiate and maintain comfort rounds  - Make Fall Risk Sign visible to staff  - Offer Toileting in advance of need  -  Initiate/Maintain bed alarm  - Obtain necessary fall risk management equipment  - Apply yellow socks and bracelet for high fall risk patients  - Consider moving patient to room near nurses station  Outcome: Progressing     Problem: Knowledge Deficit  Goal: Patient/family/caregiver demonstrates understanding of disease process, treatment plan, medications, and discharge instructions  Description: Complete learning assessment and assess knowledge base.  Interventions:  - Provide teaching at level of understanding  - Provide teaching via preferred learning methods  Outcome: Progressing     Problem: Prexisting or High Potential for Compromised Skin Integrity  Goal: Skin integrity is maintained or improved  Description: INTERVENTIONS:  - Identify patients at risk for skin breakdown  - Assess and monitor skin integrity  - Assess and monitor nutrition and hydration status  - Monitor labs   - Assess for incontinence   - Turn and reposition patient  - Assist with mobility/ambulation  - Relieve pressure over bony prominences  - Avoid friction and shearing  - Provide appropriate hygiene as needed including keeping skin clean and dry  - Evaluate need for skin moisturizer/barrier cream  - Collaborate with interdisciplinary team   - Patient/family teaching  - Consider wound care consult   Outcome: Progressing

## 2024-05-20 NOTE — ASSESSMENT & PLAN NOTE
Wt Readings from Last 3 Encounters:   05/20/24 62.3 kg (137 lb 5.6 oz)   05/18/24 61.1 kg (134 lb 12.8 oz)   05/09/24 62.1 kg (137 lb)   Echo completed on 4/2023: EF 66%.    Plan:  Continue to monitor daily weights  Monitor I/O's

## 2024-05-20 NOTE — ASSESSMENT & PLAN NOTE
Secondary to severe COPD  Continue BiPAP as needed  O2 via nasal cannula while awake  Continue Perforomist, Pulmicort and Solu-Medrol twice daily

## 2024-05-21 ENCOUNTER — APPOINTMENT (OUTPATIENT)
Dept: PULMONOLOGY | Facility: CLINIC | Age: 89
End: 2024-05-21
Payer: MEDICARE

## 2024-05-21 PROBLEM — R93.5 ABNORMAL CT OF THE ABDOMEN: Status: ACTIVE | Noted: 2024-05-21

## 2024-05-21 LAB
ANION GAP SERPL CALCULATED.3IONS-SCNC: 2 MMOL/L (ref 4–13)
BASE EX.OXY STD BLDV CALC-SCNC: 86.2 % (ref 60–80)
BASE EXCESS BLDV CALC-SCNC: 13.9 MMOL/L
BUN SERPL-MCNC: 28 MG/DL (ref 5–25)
CALCIUM SERPL-MCNC: 9.2 MG/DL (ref 8.4–10.2)
CHLORIDE SERPL-SCNC: 100 MMOL/L (ref 96–108)
CO2 SERPL-SCNC: 43 MMOL/L (ref 21–32)
CREAT SERPL-MCNC: 0.98 MG/DL (ref 0.6–1.3)
ERYTHROCYTE [DISTWIDTH] IN BLOOD BY AUTOMATED COUNT: 12.9 % (ref 11.6–15.1)
GFR SERPL CREATININE-BSD FRML MDRD: 69 ML/MIN/1.73SQ M
GLUCOSE SERPL-MCNC: 112 MG/DL (ref 65–140)
HCO3 BLDV-SCNC: 42.5 MMOL/L (ref 24–30)
HCT VFR BLD AUTO: 35.7 % (ref 36.5–49.3)
HGB BLD-MCNC: 10.6 G/DL (ref 12–17)
MCH RBC QN AUTO: 31.7 PG (ref 26.8–34.3)
MCHC RBC AUTO-ENTMCNC: 29.7 G/DL (ref 31.4–37.4)
MCV RBC AUTO: 107 FL (ref 82–98)
O2 CT BLDV-SCNC: 14.7 ML/DL
PCO2 BLDV: 76 MM HG (ref 42–50)
PH BLDV: 7.37 [PH] (ref 7.3–7.4)
PLATELET # BLD AUTO: 140 THOUSANDS/UL (ref 149–390)
PMV BLD AUTO: 9.5 FL (ref 8.9–12.7)
PO2 BLDV: 51.7 MM HG (ref 35–45)
POTASSIUM SERPL-SCNC: 3.9 MMOL/L (ref 3.5–5.3)
RBC # BLD AUTO: 3.34 MILLION/UL (ref 3.88–5.62)
SODIUM SERPL-SCNC: 145 MMOL/L (ref 135–147)
WBC # BLD AUTO: 8.71 THOUSAND/UL (ref 4.31–10.16)

## 2024-05-21 PROCEDURE — 94664 DEMO&/EVAL PT USE INHALER: CPT

## 2024-05-21 PROCEDURE — 94640 AIRWAY INHALATION TREATMENT: CPT

## 2024-05-21 PROCEDURE — 82805 BLOOD GASES W/O2 SATURATION: CPT

## 2024-05-21 PROCEDURE — 80048 BASIC METABOLIC PNL TOTAL CA: CPT

## 2024-05-21 PROCEDURE — 99232 SBSQ HOSP IP/OBS MODERATE 35: CPT | Performed by: INTERNAL MEDICINE

## 2024-05-21 PROCEDURE — 99222 1ST HOSP IP/OBS MODERATE 55: CPT

## 2024-05-21 PROCEDURE — NC001 PR NO CHARGE

## 2024-05-21 PROCEDURE — 85027 COMPLETE CBC AUTOMATED: CPT

## 2024-05-21 PROCEDURE — 94760 N-INVAS EAR/PLS OXIMETRY 1: CPT

## 2024-05-21 RX ORDER — DIVALPROEX SODIUM 125 MG/1
250 CAPSULE, COATED PELLETS ORAL
Status: DISCONTINUED | OUTPATIENT
Start: 2024-05-21 | End: 2024-05-23 | Stop reason: HOSPADM

## 2024-05-21 RX ADMIN — HEPARIN SODIUM 5000 UNITS: 5000 INJECTION INTRAVENOUS; SUBCUTANEOUS at 15:44

## 2024-05-21 RX ADMIN — LOSARTAN POTASSIUM 25 MG: 25 TABLET, FILM COATED ORAL at 08:14

## 2024-05-21 RX ADMIN — MELATONIN 3 MG: 3 TAB ORAL at 21:33

## 2024-05-21 RX ADMIN — PANTOPRAZOLE SODIUM 40 MG: 40 TABLET, DELAYED RELEASE ORAL at 08:14

## 2024-05-21 RX ADMIN — FORMOTEROL FUMARATE 20 MCG: 20 SOLUTION RESPIRATORY (INHALATION) at 20:04

## 2024-05-21 RX ADMIN — ATORVASTATIN CALCIUM 20 MG: 20 TABLET, FILM COATED ORAL at 16:24

## 2024-05-21 RX ADMIN — HEPARIN SODIUM 5000 UNITS: 5000 INJECTION INTRAVENOUS; SUBCUTANEOUS at 21:31

## 2024-05-21 RX ADMIN — FORMOTEROL FUMARATE 20 MCG: 20 SOLUTION RESPIRATORY (INHALATION) at 07:16

## 2024-05-21 RX ADMIN — BUDESONIDE 0.5 MG: 0.5 INHALANT RESPIRATORY (INHALATION) at 07:16

## 2024-05-21 RX ADMIN — SENNOSIDES 8.6 MG: 8.6 TABLET, FILM COATED ORAL at 21:33

## 2024-05-21 RX ADMIN — LOSARTAN POTASSIUM 25 MG: 25 TABLET, FILM COATED ORAL at 21:33

## 2024-05-21 RX ADMIN — BUDESONIDE 0.5 MG: 0.5 INHALANT RESPIRATORY (INHALATION) at 20:04

## 2024-05-21 RX ADMIN — HEPARIN SODIUM 5000 UNITS: 5000 INJECTION INTRAVENOUS; SUBCUTANEOUS at 08:14

## 2024-05-21 NOTE — RESTORATIVE TECHNICIAN NOTE
Restorative Technician Note      Patient Name: Severiano Feliciano     Restorative Tech Visit Date: 05/21/24  Note Type: Mobility  Patient Position Upon Consult: Supine  Activity Performed: Ambulated; Range of motion  Patient Position at End of Consult: Supine; All needs within reach

## 2024-05-21 NOTE — ASSESSMENT & PLAN NOTE
Chronic hypoxic and hypercapnic respiratory failure secondary to very severe COPD with multiple admissions  Currently on 5 L of oxygen saturating in the high 90s, weaned down to oxygen saturation 89 to 92%  Steroids were stopped in the ICU  Continue nebulizer treatments

## 2024-05-21 NOTE — PROGRESS NOTES
[facility]  Progress Note  Name: Severiano Feliciano I  MRN: [id number]  Unit/Bed#: [place] [address] -01 I Date of Admission: 5/19/2024   Date of Service: 5/21/2024 I Hospital Day: 2    Assessment & Plan   * Chronic obstructive pulmonary disease, unspecified COPD type (HCC)  Assessment & Plan  Patient is an 88-year-old male with a past medical history of severe COPD, chronic respiratory failure with hypoxia and hypercapnia, chronic diastolic heart failure, hyperlipidemia, history of DVT, descending aortic aneurysm, hypertension who presented to the hospital with shortness of breath.  Patient had multiple admissions in the past for COPD exacerbation.  Scented to the ED with altered mental status  Found to have acute on chronic hypoxic and hypercapnic respiratory failure, admitted to ICU on BiPAP  CT chest abdomen pelvis.  Emphysema, scattered subsegmental atelectasis  For possible mild exacerbation started on Solu-Medrol and bronchodilators with improvement  In the ICU steroids were stopped, continue the nebulizer treatments  According to last pulmonology note he has no O2 needs at rest and 3 L with exertion  He is currently on 5 L of oxygen but saturating in the high 90s  Goal oxygen saturation 89 to 92%, titrate oxygen down  Patient's primary pulmonologist recommended palliative care evaluation for severe COPD, patient and family not ready    Chronic respiratory failure with hypoxia and hypercapnia (HCC)  Assessment & Plan  Chronic hypoxic and hypercapnic respiratory failure secondary to very severe COPD with multiple admissions  Currently on 5 L of oxygen saturating in the high 90s, weaned down to oxygen saturation 89 to 92%  Steroids were stopped in the ICU  Continue nebulizer treatments    Encephalopathy acute  Assessment & Plan  Suspect secondary to acute on chronic hypoxic and hypercapnic respiratory failure  Labs stable, hypercapnic on VBG but pH normal  CT head showed no acute  intracranial abnormality  Encourage BiPAP at bedtime and prn with naps  Geriatrics on board, appreciate recommendations    Abnormal CT of the abdomen  Assessment & Plan  Bilateral nonobstructing renal calculi  Cholelithiasis without evidence of acute cholecystitis  Colonic diverticulosis without evidence of acute diverticulitis  Monitor    Chronic diastolic (congestive) heart failure (HCC)  Assessment & Plan  Wt Readings from Last 3 Encounters:   05/21/24 62.5 kg (137 lb 12.6 oz)   05/18/24 61.1 kg (134 lb 12.8 oz)   05/09/24 62.1 kg (137 lb)     Euvolemic  Continue daily weights, monitor intake and output          Hyperlipidemia  Assessment & Plan  Continue statin    Benign essential hypertension  Assessment & Plan  Continue losartan  Hydralazine as needed    History of DVT (deep vein thrombosis)  Assessment & Plan  Continue cutaneous heparin and SCDs    Descending aortic aneurysm (HCC)  Assessment & Plan  Continue outpatient follow-up               VTE Pharmacologic Prophylaxis:   Moderate Risk (Score 3-4) - Pharmacological DVT Prophylaxis Ordered: heparin.    Mobility:   Basic Mobility Inpatient Raw Score: 12  JH-HLM Goal: 4: Move to chair/commode  JH-HLM Achieved: 2: Bed activities/Dependent transfer  JH-HLM Goal NOT achieved. Continue with multidisciplinary rounding and encourage appropriate mobility to improve upon JH-HLM goals.    Patient Centered Rounds: I performed bedside rounds with nursing staff today.   Discussions with Specialists or Other Care Team Provider: nursing, cm, geriatrics    Education and Discussions with Family / Patient: Updated  (son) via phone.      Current Length of Stay: 2 day(s)  Current Patient Status: Inpatient   Certification Statement: The patient will continue to require additional inpatient hospital stay due to encephalopathy  Discharge Plan: Anticipate discharge in 48-72 hrs to discharge location to be determined pending rehab evaluations.    Code Status: Level 1  - Full Code    Subjective:   Patient was transferred out from ICU yesterday.  He was confused overnight, somewhat able to be redirected by family.  Today he would not open his eyes, he would say his name, he would answered with yes or no.  He said he is at his home.    Objective:     Vitals:   Temp (24hrs), Av °F (36.7 °C), Min:97.7 °F (36.5 °C), Max:98.3 °F (36.8 °C)    Temp:  [97.7 °F (36.5 °C)-98.3 °F (36.8 °C)] 98.2 °F (36.8 °C)  HR:  [62-85] 73  Resp:  [20-31] 20  BP: (108-168)/(62-88) 152/71  SpO2:  [93 %-99 %] 98 %  Body mass index is 24.41 kg/m².     Input and Output Summary (last 24 hours):     Intake/Output Summary (Last 24 hours) at 2024 1049  Last data filed at 2024 0533  Gross per 24 hour   Intake 480 ml   Output 775 ml   Net -295 ml       Physical Exam:   Physical Exam  Constitutional:       Comments: Would not open his eyes but answers questions   HENT:      Head: Atraumatic.   Cardiovascular:      Rate and Rhythm: Normal rate and regular rhythm.      Heart sounds: No murmur heard.  Pulmonary:      Comments: Decreased breath sounds bilaterally, on 5 L of oxygen  Abdominal:      General: Bowel sounds are normal. There is no distension.      Palpations: Abdomen is soft.   Musculoskeletal:         General: No swelling.      Cervical back: Neck supple.   Neurological:      General: No focal deficit present.      Mental Status: He is disoriented.   Psychiatric:         Mood and Affect: Mood normal.          Additional Data:     Labs:  Results from last 7 days   Lab Units 24  0424   WBC Thousand/uL 8.71 4.41   HEMOGLOBIN g/dL 10.6* 11.1*   HEMATOCRIT % 35.7* 37.4   PLATELETS Thousands/uL 140* 134*   SEGS PCT %  --  86*   LYMPHO PCT %  --  11*   MONO PCT %  --  3*   EOS PCT %  --  0     Results from last 7 days   Lab Units 24  0524  0424 24  0801   SODIUM mmol/L 145   < > 144   POTASSIUM mmol/L 3.9   < > 4.1   CHLORIDE mmol/L 100   < > 99   CO2 mmol/L  43*   < > >45*   BUN mg/dL 28*   < > 18   CREATININE mg/dL 0.98   < > 0.84   ANION GAP mmol/L 2*   < >  --    CALCIUM mg/dL 9.2   < > 9.2   ALBUMIN g/dL  --   --  3.8   TOTAL BILIRUBIN mg/dL  --   --  0.55   ALK PHOS U/L  --   --  70   ALT U/L  --   --  35   AST U/L  --   --  32   GLUCOSE RANDOM mg/dL 112   < > 108    < > = values in this interval not displayed.     Results from last 7 days   Lab Units 05/19/24  0801   INR  1.07     Results from last 7 days   Lab Units 05/19/24  0756   POC GLUCOSE mg/dl 101         Results from last 7 days   Lab Units 05/19/24  0801   LACTIC ACID mmol/L 0.5   PROCALCITONIN ng/ml 0.13       Lines/Drains:  Invasive Devices       Peripheral Intravenous Line  Duration             Peripheral IV 05/19/24 Right Antecubital 2 days                          Imaging: Reviewed radiology reports from this admission including: chest CT scan, abdominal/pelvic CT, and CT head    Recent Cultures (last 7 days):   Results from last 7 days   Lab Units 05/19/24  0844 05/19/24  0800   BLOOD CULTURE  No Growth at 24 hrs. No Growth at 24 hrs.       Last 24 Hours Medication List:   Current Facility-Administered Medications   Medication Dose Route Frequency Provider Last Rate    acetaminophen  975 mg Oral Q6H PRN Buster Neville MD      aluminum-magnesium hydroxide-simethicone  30 mL Oral Q4H PRN Calin Chacon PA-C      atorvastatin  20 mg Oral Daily With Dinner Buster Neville MD      budesonide  0.5 mg Nebulization Q12H Buster Neville MD      formoterol  20 mcg Nebulization Q12H Buster Neville MD      heparin (porcine)  5,000 Units Subcutaneous Q8H [facility] Buster Neville MD      hydrALAZINE  10 mg Intravenous Q6H PRN Buster Neville MD      levalbuterol  1.25 mg Nebulization Q8H PRN Buster Neville MD      losartan  25 mg Oral BID Buster Neville MD      melatonin  3 mg Oral HS Calin Chacon PA-C      pantoprazole  40 mg Oral Early Morning Buster Neville MD      polyethylene glycol  17 g Oral Daily PRN Calin Chacon PA-C       senna  1 tablet Oral HS Buster Neville MD          Today, Patient Was Seen By: Liliana Mojica MD    **Please Note: This note may have been constructed using a voice recognition system.**

## 2024-05-21 NOTE — NURSING NOTE
Multiple attempts were made to place BiPap on pt for overnight. Pt wore for 5-10 min only. Pt stated that he doesn't like how it feels. Pt and family strongly requested pt be permitted to walk to the bathroom. Pt was barely able to stand and pivot to the commode.

## 2024-05-21 NOTE — PROGRESS NOTES
Deterioration Index Critical Care Recommendations  Room #: 208  Deterioration index score: 63.27%    Critical Care recommends trying BiPAP for 1-2 hours. If pt does not tolerate BiPAP, increase NC from 4 to 6L/trying midflow. Please reach out to critical care for any further concerns     Brief summary:   Critical care was brought to the patient's bedside via deterioration index alert. The alert was concerning for hypoxia and tachypnea. Pt has a history of COPD and required BiPAP intermittently throughout this admission. The pt. was sleeping saturating 87% upon my arrival to the room. His lungs were CTA bilaterally. Recommended switching from NC to BiPAP for 1-2 hours.       Please contact critical care via [facility] with any questions or concerns.

## 2024-05-21 NOTE — CONSULTS
Consultation - Geriatrics   Severiano Feliciano 88 y.o. male MRN: [id number]  Unit/Bed#: [place] [address] -01 Encounter: [id number]      Assessment/Plan    Acute Encephalopathy  Presented to the hospital for altered mental status and shortness of breath   Baseline mentation is alert and oriented x 3-4  Current cause considerations   Suspected to be multifactorial secondary to possible underlying cognitive impairment (he does have a documented history of dementia but son is unaware if this diagnosis is accurate), respiratory failure with hypoxia and hypercapnia, steroid use, recent ICU stay, history of encephalopathy, vision impairment, hearing impairment, and hospitalization   No UA obtained on admission   No leukocytosis noted on labs today   Hemoglobin stable at 10.6 on labs today   Patient presented to the hospital with altered mental status   He did require two doses of lorazepam yesterday for increased confusion/agitation   Would avoid benzodiazepines if possible as these medications can worsen delirium  Patient does have documented allergy to Zyprexa listed as tongue swelling   There is cross-reaction with other antipsychotics so would avoid all antipsychotics   Would consider Depakote 250 mg tonight around 2000 to see if this helps with sleep and agitation   Can use Depakote 125 mg during the day if needed for acute agitation and behaviors   Would recommend checking liver function if patient is requiring several doses   Patient is alert and oriented to person on exam today   He is a bit lethargic but is calm and cooperative when awake  Identify and treat reversible causes of confusion including infection, dehydration, electrolyte imbalance, anemia, hypoxia, urinary retention, constipation, pain, and sleep disturbance  Maintain delirium precautions   Provide redirection, reorientation, and distraction techniques  Maintain fall and safety precautions   Assist with ADLs/IADLs  Avoid deliriogenic medications such as  tramadol, benzodiazepines, anticholinergics, benadryl  Treat pain using geriatric pain protocol   Encourage oral hydration and nutrition   Monitor for constipation and urinary retention   Implement sleep hygiene and limit night time interuptions   Maintain sleep-wake cycle   Encourage early and frequent mobilization   Redirect and reorient as needed   Keep mentally, physically, and socially active    Cognitive Screening  Patient appears to have a documented diagnosis of dementia, however, there is no workup including cognitive testing noted in epic  It is unclear when the official diagnosis was given but it was first documented as mild-to-moderate in May 2020   Patients son is not aware of a dementia diagnosis and states the patient is alert and oriented x 3-4   Most recent TSH on 4/25/2024 noted to be 0.258  Most recent vitamin B 12 level on 12/7/2023 noted to be 317  Would consider repeating on routine labs and supplementing for a level < 400   CT of the head on 5/19/2024 revealed moderate microangiopathic changes   Patient is alert and oriented to person on exam today   Maintain delirium precautions as discussed above   Redirect and reorient as needed  Keep physically, mentally, and socially active     Deconditioning   Baseline function: needs assistance with ADLs and IADLs  Patient is at increased risk for deconditioning secondary to possible underlying cognitive impairment, respiratory failure with hypoxia and hypercapnia, weakness, gait dysfunction, and hospitalization   Continue to optimize diet, hydration, and mobility for healing   GFR 69 on labs today   Patient has no documented history of CKD  Keep hydrated   Congestive heart failure   Most recent echo on 4/11/2023 revealed an EF of 66%  No edema noted on exam today   Recommend low sodium diet   Continue to monitor weights and I&O  Monitor for signs and symptoms of infection, dehydration, DVT, and skin breakdown    Frailty   Clinical Frail Scale: 6-  Moderately Frail   Need help with all outside activities  Need help with stairs and bathing  May need assistance with dressing  Most recent albumin on 5/19/2024 noted to be 3.8  Consider nutrition consult  Encourage protein supplementation     Ambulatory Dysfunction/Falls  Patient has had no recent falls at home   He ambulates with a cane at baseline   PT/OT consulted to assist with strengthening/mobility and assist with discharge planning to appropriate level of care  Assess patient frequently for physical needs, encourage use of assistant devices as needed and directed by PT/OT  Identify cognitive and physical deficits and behaviors that affect risk of falls  Consider moving patient closer to nursing station to monitor more closely for impulsive behavior which may increase risk of falls  [place] fall and safety precautions   Educate patient/family on patient safety including physical limitations and importance of using call bell for assistance   Modify environment to reduce risk of injury including disconnecting from pole when not in use, ensuring adequate lighting in room and restroom, ensuring that path to restroom is clear and free of trip hazards  Out of bed as tolerated    Impaired Vision   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   Patient does wear eyeglasses at baseline   Recommend use of corrective lenses at all appropriate times  Encourage adequate lighting and encourage use of assistance with ambulation  Keep personal belongings close to avoid reaching  Encourage appropriate footwear at all times  Recommend large font for printed materials provided to patient    Impaired Hearing   Patient has a documented history of bilateral hearing loss   Son notes that he did undergo surgery for hearing loss and has been doing better with his hearing   He does not wear hearing aids at baseline   Hearing impairment strongly correlated with depression, cognitive impairment, delirium  and falls in the older adult  Use hearing aids or sound amplifier  Speak face to face  Use clear dictation and enunciation of words    Dentition/Appetite   Patient does not wear dentures   He has a good appetite at baseline   Patient has been eating well here   Ensure meal consistency is appropriate for all abilities   Consider nutrition consult   Continue aspiration precautions     Elimination   Patient is primarily continent of bowel and bladder at baseline  Son notes when oxygen levels get low he can be incontinent at times   He does have some difficulty with constipation at times   He does take stool softeners at baseline   Last documented bowel movement was this morning   Current bowel regimen includes   Senna 8.6 mg daily at bedtime   MiraLAX 17 g daily prn   Monitor for constipation and urinary retention     Insomnia   Patients son notes that the patient wakes up several times per night to use the bathroom   He states that the patient does sleep quite a bit during the day as well   He does not take any medication for sleep at baseline   Patient did not sleep well last night   Will trial Depakote tonight as discussed above   First line is behavioral therapy   Avoid sedative hypnotics including benzodiazepines and benadryl  Encourage staying awake during the day   Encourage daytime activities and morning exercise   Decrease or eliminate daytime naps   Avoid caffeine especially during late afternoon and evening hours  Establish a nighttime routine  Implement sleep hygiene and limit nighttime interruptions  Can consider melatonin 3 mg daily at bedtime for sleep if needed     Anxiety/Depression  Patient has no documented history of anxiety or depression   He does not appear to be on any medication for this   Continue supportive care     Chronic Respiratory Failure with Hypoxia and Hypercapnia  Secondary to COPD with multiple prior hospitalizations   He does wear baseline 2-3 L of baseline supplemental oxygen   CO2  has been elevated but patient has been refusing BiPAP at night   CO2 this morning on VBG elevated to 76 which is increased from last evening at 70.1  He is on nebulizers Q 12 hours   Steroids had been initiated but have since been discontinued   He is currently maintained on nebulizer treatments   Management per primary team     Chronic Obstructive Pulmonary Disease   Patient with severe COPD history   Suspected to have mild exacerbation on admission   Solu-medrol started but was discontinued yesterday   He is on nebulizer treatments Q 12 hours  He does not appear to be in any acute distress on my exam today   Oxygen saturation stable at 94% on 5 L NC  He has been recommended for palliative care in the outpatient setting and during this current admission, however, patient and family have declined  Management per primary team     Home Safety  Patient resides at home with his wife   Son Daljit is a paid caregiver  He needs assistance with ADLs and IADLs   He no longer drives     Advanced Care Planning  Level 1 Full Code    Home Medication Review   [facility] ([phone])  Attempted to contact patients pharmacy but the call went right to voicemail x 2. Will attempt to contact again tomorrow.     I have personally reviewed this medication list with the patients pharmacy listed above.       History of Present Illness   Physician Requesting Consult: Liliana Mojica MD  Reason for Consult / Principal Problem: Altered mental status   Hx and PE limited by: Confusion/lethargy     HPI: Severiano Feliciano is a 88 y.o. year old male who has hypertension, hyperlipidemia, COPD, CHF, and ambulatory dysfunction presented to the hospital due to altered mental status and shortness of breath.  His son stated that the patient had been at [place] the day prior to admission as he was feeling sick.  He was sent home.  In the morning, the patient's son stated that he arrived to their house and the patient was in bed,  not speaking, and shaking.  EMS was called.  The patient was awake but would not respond and was on a nonrebreather once he arrived to the ER.  He was suspected to have a COPD exacerbation and was started on IV steroids.  He has been having periods of lethargy and has been confused.  Encephalopathy was suspected to be secondary to hypoxia and hypercapnia which was noted to be improving, however, overnight the patient did not sleep.  He did require 2 doses of Ativan yesterday.  Steroids have since been discontinued.  Geriatrics has been consulted for altered mental status.    Care was coordinated with the patient's son Daljit over the phone.  He states the patient needs assistance with ADLs and IADLs.  He notes the patient no longer drives.  He is unsure if the patient has a history of dementia but states that at baseline the patient is alert and oriented x 3-4.  He has no behaviors or agitation at home.  He states the patient does have oxygen and BiPAP at home.  Patient is compliant with his oxygen but is often not compliant with the BiPAP as he does not like the mask.  He states that he is looking into getting the patient a different mask.  He notes no recent falls at home and states that the patient ambulates with a cane at baseline.  He states the patient does have trouble with his vision as he had an accident when he was little with residual deficits in the right eye.  He does wear glasses at baseline.  He states the patient did have a history of hearing impairment, however, the patient has since had surgery and his hearing has improved.  Does not wear hearing aids.  Patient does not wear dentures.  He has a good appetite at baseline.  He states the patient does have some difficulty with constipation and takes stool softeners at home.  He is primarily continent of bowel and bladder at baseline.  He states with the patient's oxygen levels decrease he can be incontinent at times.  He states the patient sleeps a lot  at home.  He does not take any medication for sleep at baseline.  Discussed initiation of Depakote tonight.  Patients son was in agreement.    The patient was seen and evaluated today at the bedside for geriatric consult.  He is noted to be lying in bed comfortably in no acute distress.  He is currently on 5 L of supplemental oxygen.  He does not appear to be in any acute distress.  He is sleeping and is arousable to his name.  He answers questions with his eyes closed.  He is oriented only to person.    Care was coordinated with the patient's nurse Tay.  He states the patient did not sleep very well last night.  He states the patient did take his medication without issue this morning.    Care was also coordinated with Dr. Mojica with internal medicine.       Inpatient consult to Gerontology  Consult performed by: RAQUEL Hudson  Consult ordered by: Calin Chacon PA-C          Review of Systems   Unable to perform ROS: Mental status change       Historical Information   Past Medical History:   Diagnosis Date    Acute metabolic encephalopathy 06/13/2020    Age-related cataract of right eye 04/04/2023    patient is medically cleared and presents with low risk of complication with this upcoming R cataract surgery.    Anemia     Aneurysm, aorta, thoracic (HCC)     Appendicolith     Ascending aortic aneurysm (HCC)     3.7    Asthma     BPH (benign prostatic hyperplasia)     CAD (coronary artery disease)     noted on CT scan    CHF (congestive heart failure) (HCC)     COPD (chronic obstructive pulmonary disease) (HCC)     Descending thoracic aortic aneurysm (HCC)     Diabetes mellitus (HCC)     Diverticulosis     Former tobacco use     GERD (gastroesophageal reflux disease)     History of DVT (deep vein thrombosis)     Left leg    History of transfusion     Hypertension     Hypoxemia 04/30/2023    Inguinal hernia     right    Nephrolithiasis     Oxygen dependent     2LNC    Oxygen dependent     Pneumonia      Pre-diabetes     Prostate calculus     PVD (peripheral vascular disease) (HCC)     Recurrent right inguinal hernia w incarcertion 2023    Thoracic aortic aneurysm without rupture (HCC) 2018    Added automatically from request for surgery [id number]    Thrombocytopenia (HCC) 2018    Ulcer     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)     Varicose vein of leg     b/l     Past Surgical History:   Procedure Laterality Date    CARDIAC SURGERY      ESOPHAGOGASTRODUODENOSCOPY      HERNIA REPAIR Right 2019    Procedure: REPAIR HERNIA INGUINAL WITH MESH;  Surgeon: David Lowry MD;  Location:  MAIN OR;  Service: General    HERNIA REPAIR Right 2023    Procedure: REPAIR RECURRENT INCARERATED HERNIA INGUINAL OPEN, RIGHT ORCHIECTOMY;  Surgeon: Mason Bertrand MD;  Location: [facility];  Service: General    INGUINAL HERNIA REPAIR Bilateral     IR TEVAR  2018    DC EVASC RPR DTA COVERAGE ART ORIGIN 1ST ENDOPROSTH N/A 2018    Procedure: TEVAR - endovascular thoracic aortic aneurysm repair;  Surgeon: Gladis Ortega MD;  Location: [facility];  Service: Vascular    THORACIC AORTIC ANEURYSM REPAIR  2018    VARICOSE VEIN SURGERY Bilateral     vein stripping     Social History   Social History     Substance and Sexual Activity   Alcohol Use Never     Social History     Substance and Sexual Activity   Drug Use No     Social History     Tobacco Use   Smoking Status Former    Current packs/day: 0.00    Average packs/day: 1 pack/day for 35.0 years (35.0 ttl pk-yrs)    Types: Cigarettes    Start date:     Quit date:     Years since quittin.4   Smokeless Tobacco Never     Family History:   Family History   Problem Relation Age of Onset    Tuberculosis Mother     No Known Problems Father     Cancer Sister     Diabetes Family     Hypertension Family        Meds/Allergies   Current meds:   Current Facility-Administered Medications   Medication Dose Route Frequency     "acetaminophen (TYLENOL) tablet 975 mg  975 mg Oral Q6H PRN    aluminum-magnesium hydroxide-simethicone (MAALOX) oral suspension 30 mL  30 mL Oral Q4H PRN    atorvastatin (LIPITOR) tablet 20 mg  20 mg Oral Daily With Dinner    budesonide (PULMICORT) inhalation solution 0.5 mg  0.5 mg Nebulization Q12H    formoterol (PERFOROMIST) nebulizer solution 20 mcg  20 mcg Nebulization Q12H    heparin (porcine) subcutaneous injection 5,000 Units  5,000 Units Subcutaneous Q8H KIKE    hydrALAZINE (APRESOLINE) injection 10 mg  10 mg Intravenous Q6H PRN    levalbuterol (XOPENEX) inhalation solution 1.25 mg  1.25 mg Nebulization Q8H PRN    losartan (COZAAR) tablet 25 mg  25 mg Oral BID    melatonin tablet 3 mg  3 mg Oral HS    pantoprazole (PROTONIX) EC tablet 40 mg  40 mg Oral Early Morning    polyethylene glycol (MIRALAX) packet 17 g  17 g Oral Daily PRN    senna (SENOKOT) tablet 8.6 mg  1 tablet Oral HS     Allergies   Allergen Reactions    Penicillins Hives, Itching and Rash    Lisinopril Rash     Side pains and rash     Zyprexa [Olanzapine] Tongue Swelling       Objective   Vitals: Blood pressure 152/71, pulse 73, temperature 98.2 °F (36.8 °C), resp. rate 20, height 5' 3\" (1.6 m), weight 62.5 kg (137 lb 12.6 oz), SpO2 98%.,Body mass index is 24.41 kg/m².      Physical Exam  Vitals and nursing note reviewed.   Constitutional:       General: He is sleeping. He is not in acute distress.     Appearance: He is not ill-appearing.      Comments: Frail appearing elderly male  Sleeping but is answering questions with eyes closed   HENT:      Head: Normocephalic.      Mouth/Throat:      Mouth: Mucous membranes are dry.   Eyes:      General: No scleral icterus.     Conjunctiva/sclera: Conjunctivae normal.   Cardiovascular:      Rate and Rhythm: Normal rate and regular rhythm.   Pulmonary:      Effort: Pulmonary effort is normal. No respiratory distress.   Abdominal:      General: Bowel sounds are normal. There is no distension.      " "Palpations: Abdomen is soft.   Musculoskeletal:      Right lower leg: No edema.      Left lower leg: No edema.   Skin:     General: Skin is warm and dry.   Neurological:      Mental Status: He is disoriented.      Motor: Weakness present.      Gait: Gait abnormal.      Comments: Oriented to person   Disoriented to place and time         Lab Results:   Results from last 7 days   Lab Units 05/21/24  0519   WBC Thousand/uL 8.71   HEMOGLOBIN g/dL 10.6*   HEMATOCRIT % 35.7*   PLATELETS Thousands/uL 140*        Results from last 7 days   Lab Units 05/21/24  0519 05/20/24  0424 05/19/24  0801   POTASSIUM mmol/L 3.9   < > 4.1   CHLORIDE mmol/L 100   < > 99   CO2 mmol/L 43*   < > >45*   BUN mg/dL 28*   < > 18   CREATININE mg/dL 0.98   < > 0.84   CALCIUM mg/dL 9.2   < > 9.2   ALK PHOS U/L  --   --  70   ALT U/L  --   --  35   AST U/L  --   --  32    < > = values in this interval not displayed.       Imaging Studies: I have personally reviewed pertinent reports.    EKG, Pathology, and Other Studies: I have personally reviewed pertinent reports.    VTE Prophylaxis: Heparin    Code Status: Level 1 - Full Code      Please note:  Voice-recognition software may have been used in the preparation of this document.  Occasional wrong word or \"sound-alike\" substitutions may have occurred due to the inherent limitations of voice recognition software.  Interpretation should be guided by context.    "

## 2024-05-21 NOTE — PLAN OF CARE
Problem: SAFETY ADULT  Goal: Patient will remain free of falls  Description: INTERVENTIONS:  - Educate patient/family on patient safety including physical limitations  - Instruct patient to call for assistance with activity   - Consult OT/PT to assist with strengthening/mobility   - Keep Call bell within reach  - Keep bed low and locked with side rails adjusted as appropriate  - Keep care items and personal belongings within reach  - Initiate and maintain comfort rounds  - Make Fall Risk Sign visible to staff  - Offer Toileting every 2 Hours, in advance of need  - Initiate/Maintain .alarm  - Obtain necessary fall risk management equipment: .  - Apply yellow socks and bracelet for high fall risk patients  - Consider moving patient to room near nurses station  Outcome: Progressing  Goal: Maintain or return to baseline ADL function  Description: INTERVENTIONS:  -  Assess patient's ability to carry out ADLs; assess patient's baseline for ADL function and identify physical deficits which impact ability to perform ADLs (bathing, care of mouth/teeth, toileting, grooming, dressing, etc.)  - Assess/evaluate cause of self-care deficits   - Assess range of motion  - Assess patient's mobility; develop plan if impaired  - Assess patient's need for assistive devices and provide as appropriate  - Encourage maximum independence but intervene and supervise when necessary  - Involve family in performance of ADLs  - Assess for home care needs following discharge   - Consider OT consult to assist with ADL evaluation and planning for discharge  - Provide patient education as appropriate  Outcome: Progressing  Goal: Maintains/Returns to pre admission functional level  Description: INTERVENTIONS:  - Perform AM-PAC 6 Click Basic Mobility/ Daily Activity assessment daily.  - Set and communicate daily mobility goal to care team and patient/family/caregiver.   - Collaborate with rehabilitation services on mobility goals if consulted  -  Perform Range of Motion 3 times a day.  - Reposition patient every 2 hours.  - Dangle patient 3 times a day  - Stand patient 3 times a day  - Ambulate patient 3 times a day  - Out of bed to chair 3 times a day   - Out of bed for meals 3 times a day  - Out of bed for toileting  - Record patient progress and toleration of activity level   Outcome: Progressing

## 2024-05-21 NOTE — ASSESSMENT & PLAN NOTE
Bilateral nonobstructing renal calculi  Cholelithiasis without evidence of acute cholecystitis  Colonic diverticulosis without evidence of acute diverticulitis  Monitor

## 2024-05-21 NOTE — QUICK NOTE
Overnight:  Paged regarding confusion. Patient examined, speaking Citizen of [place], family bedside states is not making sense. Speech intact, clear. Requiring redirection.     Plan:  Encephalopathy, metabolic/multifactorial  - component of hypercarbia/hypoxia + hospital delirium + recent room changes  - wear BiPAP as much as possible tonight, did not tolerate  - family at bedside providing redirection, reassurance to patient, appreciated, updated on current clinical course and delirium education  - received low dose ativan earlier, trial low dose again to allow BiPAP tonight, tongue swelling allergy to Zyprexa listed   - may require in person 1:1  - repeat labs ordered for AM  - geriatrics consulted, may benefit from palliative care consult as previously recommended

## 2024-05-21 NOTE — ASSESSMENT & PLAN NOTE
Wt Readings from Last 3 Encounters:   05/21/24 62.5 kg (137 lb 12.6 oz)   05/18/24 61.1 kg (134 lb 12.8 oz)   05/09/24 62.1 kg (137 lb)     Euvolemic  Continue daily weights, monitor intake and output

## 2024-05-21 NOTE — ASSESSMENT & PLAN NOTE
Patient is an 88-year-old male with a past medical history of severe COPD, chronic respiratory failure with hypoxia and hypercapnia, chronic diastolic heart failure, hyperlipidemia, history of DVT, descending aortic aneurysm, hypertension who presented to the hospital with shortness of breath.  Patient had multiple admissions in the past for COPD exacerbation.  Scented to the ED with altered mental status  Found to have acute on chronic hypoxic and hypercapnic respiratory failure, admitted to ICU on BiPAP  CT chest abdomen pelvis.  Emphysema, scattered subsegmental atelectasis  For possible mild exacerbation started on Solu-Medrol and bronchodilators with improvement  In the ICU steroids were stopped, continue the nebulizer treatments  According to last pulmonology note he has no O2 needs at rest and 3 L with exertion  He is currently on 5 L of oxygen but saturating in the high 90s  Goal oxygen saturation 89 to 92%, titrate oxygen down  Patient's primary pulmonologist recommended palliative care evaluation for severe COPD, patient and family not ready

## 2024-05-21 NOTE — ASSESSMENT & PLAN NOTE
Suspect secondary to acute on chronic hypoxic and hypercapnic respiratory failure  Labs stable, hypercapnic on VBG but pH normal  CT head showed no acute intracranial abnormality  Encourage BiPAP at bedtime and prn with naps  Geriatrics on board, appreciate recommendations

## 2024-05-21 NOTE — PLAN OF CARE
Problem: Potential for Falls  Goal: Patient will remain free of falls  Description: INTERVENTIONS:  - Educate patient/family on patient safety including physical limitations  - Instruct patient to call for assistance with activity   - Consult OT/PT to assist with strengthening/mobility   - Keep Call bell within reach  - Keep bed low and locked with side rails adjusted as appropriate  - Keep care items and personal belongings within reach  - Initiate and maintain comfort rounds  - Make Fall Risk Sign visible to staff  - Offer Toileting every 2 Hours, in advance of need  - Initiate/Maintain BED alarm  - Obtain necessary fall risk management equipment: bed alarm, bed rails  - Apply yellow socks and bracelet for high fall risk patients  - Consider moving patient to room near nurses station  Outcome: Progressing     Problem: PAIN - ADULT  Goal: Verbalizes/displays adequate comfort level or baseline comfort level  Description: Interventions:  - Encourage patient to monitor pain and request assistance  - Assess pain using appropriate pain scale  - Administer analgesics based on type and severity of pain and evaluate response  - Implement non-pharmacological measures as appropriate and evaluate response  - Consider cultural and social influences on pain and pain management  - Notify physician/advanced practitioner if interventions unsuccessful or patient reports new pain  Outcome: Progressing     Problem: INFECTION - ADULT  Goal: Absence or prevention of progression during hospitalization  Description: INTERVENTIONS:  - Assess and monitor for signs and symptoms of infection  - Monitor lab/diagnostic results  - Monitor all insertion sites, i.e. indwelling lines, tubes, and drains  - Monitor endotracheal if appropriate and nasal secretions for changes in amount and color  - [place] appropriate cooling/warming therapies per order  - Administer medications as ordered  - Instruct and encourage patient and family to use good  hand hygiene technique  - Identify and instruct in appropriate isolation precautions for identified infection/condition  Outcome: Progressing  Goal: Absence of fever/infection during neutropenic period  Description: INTERVENTIONS:  - Monitor WBC    Outcome: Progressing     Problem: SAFETY ADULT  Goal: Patient will remain free of falls  Description: INTERVENTIONS:  - Educate patient/family on patient safety including physical limitations  - Instruct patient to call for assistance with activity   - Consult OT/PT to assist with strengthening/mobility   - Keep Call bell within reach  - Keep bed low and locked with side rails adjusted as appropriate  - Keep care items and personal belongings within reach  - Initiate and maintain comfort rounds  - Make Fall Risk Sign visible to staff  - Offer Toileting every 2 Hours, in advance of need  - Initiate/Maintain bed alarm  - Obtain necessary fall risk management equipment: bed rails, bed alarm  - Apply yellow socks and bracelet for high fall risk patients  - Consider moving patient to room near nurses station  Outcome: Progressing  Goal: Maintain or return to baseline ADL function  Description: INTERVENTIONS:  -  Assess patient's ability to carry out ADLs; assess patient's baseline for ADL function and identify physical deficits which impact ability to perform ADLs (bathing, care of mouth/teeth, toileting, grooming, dressing, etc.)  - Assess/evaluate cause of self-care deficits   - Assess range of motion  - Assess patient's mobility; develop plan if impaired  - Assess patient's need for assistive devices and provide as appropriate  - Encourage maximum independence but intervene and supervise when necessary  - Involve family in performance of ADLs  - Assess for home care needs following discharge   - Consider OT consult to assist with ADL evaluation and planning for discharge  - Provide patient education as appropriate  Outcome: Progressing  Goal: Maintains/Returns to pre  admission functional level  Description: INTERVENTIONS:  - Perform AM-PAC 6 Click Basic Mobility/ Daily Activity assessment daily.  - Set and communicate daily mobility goal to care team and patient/family/caregiver.   - Collaborate with rehabilitation services on mobility goals if consulted  - Dangle patient 3 times a day  - Stand patient 2 times a day  - Ambulate patient 1 times a day  - Out of bed to chair 2 times a day   - Out of bed for meals 2 times a day  - Out of bed for toileting  - Record patient progress and toleration of activity level   Outcome: Progressing     Problem: DISCHARGE PLANNING  Goal: Discharge to home or other facility with appropriate resources  Description: INTERVENTIONS:  - Identify barriers to discharge w/patient and caregiver  - Arrange for needed discharge resources and transportation as appropriate  - Identify discharge learning needs (meds, wound care, etc.)  - Arrange for interpretive services to assist at discharge as needed  - Refer to Case Management Department for coordinating discharge planning if the patient needs post-hospital services based on physician/advanced practitioner order or complex needs related to functional status, cognitive ability, or social support system  Outcome: Progressing     Problem: Knowledge Deficit  Goal: Patient/family/caregiver demonstrates understanding of disease process, treatment plan, medications, and discharge instructions  Description: Complete learning assessment and assess knowledge base.  Interventions:  - Provide teaching at level of understanding  - Provide teaching via preferred learning methods  Outcome: Progressing     Problem: Prexisting or High Potential for Compromised Skin Integrity  Goal: Skin integrity is maintained or improved  Description: INTERVENTIONS:  - Identify patients at risk for skin breakdown  - Assess and monitor skin integrity  - Assess and monitor nutrition and hydration status  - Monitor labs   - Assess for  incontinence   - Turn and reposition patient  - Assist with mobility/ambulation  - Relieve pressure over bony prominences  - Avoid friction and shearing  - Provide appropriate hygiene as needed including keeping skin clean and dry  - Evaluate need for skin moisturizer/barrier cream  - Collaborate with interdisciplinary team   - Patient/family teaching  - Consider wound care consult   Outcome: Progressing

## 2024-05-22 LAB
ANION GAP SERPL CALCULATED.3IONS-SCNC: 1 MMOL/L (ref 4–13)
BUN SERPL-MCNC: 23 MG/DL (ref 5–25)
CALCIUM SERPL-MCNC: 9.1 MG/DL (ref 8.4–10.2)
CHLORIDE SERPL-SCNC: 98 MMOL/L (ref 96–108)
CO2 SERPL-SCNC: 45 MMOL/L (ref 21–32)
CREAT SERPL-MCNC: 0.79 MG/DL (ref 0.6–1.3)
ERYTHROCYTE [DISTWIDTH] IN BLOOD BY AUTOMATED COUNT: 13 % (ref 11.6–15.1)
GFR SERPL CREATININE-BSD FRML MDRD: 81 ML/MIN/1.73SQ M
GLUCOSE SERPL-MCNC: 85 MG/DL (ref 65–140)
HCT VFR BLD AUTO: 38.2 % (ref 36.5–49.3)
HGB BLD-MCNC: 11.3 G/DL (ref 12–17)
MCH RBC QN AUTO: 31.4 PG (ref 26.8–34.3)
MCHC RBC AUTO-ENTMCNC: 29.6 G/DL (ref 31.4–37.4)
MCV RBC AUTO: 106 FL (ref 82–98)
PLATELET # BLD AUTO: 138 THOUSANDS/UL (ref 149–390)
PMV BLD AUTO: 9.5 FL (ref 8.9–12.7)
POTASSIUM SERPL-SCNC: 4 MMOL/L (ref 3.5–5.3)
RBC # BLD AUTO: 3.6 MILLION/UL (ref 3.88–5.62)
SODIUM SERPL-SCNC: 144 MMOL/L (ref 135–147)
WBC # BLD AUTO: 6.55 THOUSAND/UL (ref 4.31–10.16)

## 2024-05-22 PROCEDURE — 99232 SBSQ HOSP IP/OBS MODERATE 35: CPT

## 2024-05-22 PROCEDURE — 85027 COMPLETE CBC AUTOMATED: CPT | Performed by: INTERNAL MEDICINE

## 2024-05-22 PROCEDURE — 80048 BASIC METABOLIC PNL TOTAL CA: CPT | Performed by: INTERNAL MEDICINE

## 2024-05-22 PROCEDURE — 94640 AIRWAY INHALATION TREATMENT: CPT

## 2024-05-22 PROCEDURE — 99232 SBSQ HOSP IP/OBS MODERATE 35: CPT | Performed by: INTERNAL MEDICINE

## 2024-05-22 PROCEDURE — 94760 N-INVAS EAR/PLS OXIMETRY 1: CPT

## 2024-05-22 RX ADMIN — SENNOSIDES 8.6 MG: 8.6 TABLET, FILM COATED ORAL at 21:06

## 2024-05-22 RX ADMIN — PANTOPRAZOLE SODIUM 40 MG: 40 TABLET, DELAYED RELEASE ORAL at 05:51

## 2024-05-22 RX ADMIN — BUDESONIDE 0.5 MG: 0.5 INHALANT RESPIRATORY (INHALATION) at 07:04

## 2024-05-22 RX ADMIN — BUDESONIDE 0.5 MG: 0.5 INHALANT RESPIRATORY (INHALATION) at 19:40

## 2024-05-22 RX ADMIN — HEPARIN SODIUM 5000 UNITS: 5000 INJECTION INTRAVENOUS; SUBCUTANEOUS at 05:51

## 2024-05-22 RX ADMIN — HEPARIN SODIUM 5000 UNITS: 5000 INJECTION INTRAVENOUS; SUBCUTANEOUS at 14:42

## 2024-05-22 RX ADMIN — HEPARIN SODIUM 5000 UNITS: 5000 INJECTION INTRAVENOUS; SUBCUTANEOUS at 21:06

## 2024-05-22 RX ADMIN — MELATONIN 3 MG: 3 TAB ORAL at 21:06

## 2024-05-22 RX ADMIN — LOSARTAN POTASSIUM 25 MG: 25 TABLET, FILM COATED ORAL at 08:02

## 2024-05-22 RX ADMIN — DIVALPROEX SODIUM 250 MG: 125 CAPSULE ORAL at 21:04

## 2024-05-22 RX ADMIN — FORMOTEROL FUMARATE 20 MCG: 20 SOLUTION RESPIRATORY (INHALATION) at 19:40

## 2024-05-22 RX ADMIN — FORMOTEROL FUMARATE 20 MCG: 20 SOLUTION RESPIRATORY (INHALATION) at 07:04

## 2024-05-22 RX ADMIN — ATORVASTATIN CALCIUM 20 MG: 20 TABLET, FILM COATED ORAL at 15:47

## 2024-05-22 NOTE — PLAN OF CARE
Problem: Potential for Falls  Goal: Patient will remain free of falls  Description: INTERVENTIONS:  - Educate patient/family on patient safety including physical limitations  - Instruct patient to call for assistance with activity   - Consult OT/PT to assist with strengthening/mobility   - Keep Call bell within reach  - Keep bed low and locked with side rails adjusted as appropriate  - Keep care items and personal belongings within reach  - Initiate and maintain comfort rounds  - Make Fall Risk Sign visible to staff  - Offer Toileting every 2 Hours, in advance of need  - Initiate/Maintain bed alarm  - Obtain necessary fall risk management equipment: non-skid footware.  - Apply yellow socks and bracelet for high fall risk patients  - Consider moving patient to room near nurses station  Outcome: Progressing     Problem: PAIN - ADULT  Goal: Verbalizes/displays adequate comfort level or baseline comfort level  Description: Interventions:  - Encourage patient to monitor pain and request assistance  - Assess pain using appropriate pain scale  - Administer analgesics based on type and severity of pain and evaluate response  - Implement non-pharmacological measures as appropriate and evaluate response  - Consider cultural and social influences on pain and pain management  - Notify physician/advanced practitioner if interventions unsuccessful or patient reports new pain  Outcome: Progressing     Problem: INFECTION - ADULT  Goal: Absence or prevention of progression during hospitalization  Description: INTERVENTIONS:  - Assess and monitor for signs and symptoms of infection  - Monitor lab/diagnostic results  - Monitor all insertion sites, i.e. indwelling lines, tubes, and drains  - Monitor endotracheal if appropriate and nasal secretions for changes in amount and color  - [place] appropriate cooling/warming therapies per order  - Administer medications as ordered  - Instruct and encourage patient and family to use good  hand hygiene technique  - Identify and instruct in appropriate isolation precautions for identified infection/condition  Outcome: Progressing  Goal: Absence of fever/infection during neutropenic period  Description: INTERVENTIONS:  - Monitor WBC    Outcome: Progressing     Problem: SAFETY ADULT  Goal: Patient will remain free of falls  Description: INTERVENTIONS:  - Educate patient/family on patient safety including physical limitations  - Instruct patient to call for assistance with activity   - Consult OT/PT to assist with strengthening/mobility   - Keep Call bell within reach  - Keep bed low and locked with side rails adjusted as appropriate  - Keep care items and personal belongings within reach  - Initiate and maintain comfort rounds  - Make Fall Risk Sign visible to staff  - Offer Toileting every 2 Hours, in advance of need  - Initiate/Maintain bed alarm  - Obtain necessary fall risk management equipment: non-skid footwear.  - Apply yellow socks and bracelet for high fall risk patients  - Consider moving patient to room near nurses station  Outcome: Progressing  Goal: Maintain or return to baseline ADL function  Description: INTERVENTIONS:  -  Assess patient's ability to carry out ADLs; assess patient's baseline for ADL function and identify physical deficits which impact ability to perform ADLs (bathing, care of mouth/teeth, toileting, grooming, dressing, etc.)  - Assess/evaluate cause of self-care deficits   - Assess range of motion  - Assess patient's mobility; develop plan if impaired  - Assess patient's need for assistive devices and provide as appropriate  - Encourage maximum independence but intervene and supervise when necessary  - Involve family in performance of ADLs  - Assess for home care needs following discharge   - Consider OT consult to assist with ADL evaluation and planning for discharge  - Provide patient education as appropriate  Outcome: Progressing  Goal: Maintains/Returns to pre  admission functional level  Description: INTERVENTIONS:  - Perform AM-PAC 6 Click Basic Mobility/ Daily Activity assessment daily.  - Set and communicate daily mobility goal to care team and patient/family/caregiver.   - Collaborate with rehabilitation services on mobility goals if consulted  - Perform Range of Motion 3 times a day.  - Reposition patient every 2 hours.  - Dangle patient 3 times a day  - Stand patient 3 times a day  - Ambulate patient 3 times a day  - Out of bed to chair 3 times a day   - Out of bed for meals 3 times a day  - Out of bed for toileting  - Record patient progress and toleration of activity level   Outcome: Progressing     Problem: DISCHARGE PLANNING  Goal: Discharge to home or other facility with appropriate resources  Description: INTERVENTIONS:  - Identify barriers to discharge w/patient and caregiver  - Arrange for needed discharge resources and transportation as appropriate  - Identify discharge learning needs (meds, wound care, etc.)  - Arrange for interpretive services to assist at discharge as needed  - Refer to Case Management Department for coordinating discharge planning if the patient needs post-hospital services based on physician/advanced practitioner order or complex needs related to functional status, cognitive ability, or social support system  Outcome: Progressing     Problem: Knowledge Deficit  Goal: Patient/family/caregiver demonstrates understanding of disease process, treatment plan, medications, and discharge instructions  Description: Complete learning assessment and assess knowledge base.  Interventions:  - Provide teaching at level of understanding  - Provide teaching via preferred learning methods  Outcome: Progressing     Problem: Prexisting or High Potential for Compromised Skin Integrity  Goal: Skin integrity is maintained or improved  Description: INTERVENTIONS:  - Identify patients at risk for skin breakdown  - Assess and monitor skin integrity  - Assess  and monitor nutrition and hydration status  - Monitor labs   - Assess for incontinence   - Turn and reposition patient  - Assist with mobility/ambulation  - Relieve pressure over bony prominences  - Avoid friction and shearing  - Provide appropriate hygiene as needed including keeping skin clean and dry  - Evaluate need for skin moisturizer/barrier cream  - Collaborate with interdisciplinary team   - Patient/family teaching  - Consider wound care consult   Outcome: Progressing     Problem: CARDIOVASCULAR - ADULT  Goal: Maintains optimal cardiac output and hemodynamic stability  Description: INTERVENTIONS:  - Monitor I/O, vital signs and rhythm  - Monitor for S/S and trends of decreased cardiac output  - Administer and titrate ordered vasoactive medications to optimize hemodynamic stability  - Assess quality of pulses, skin color and temperature  - Assess for signs of decreased coronary artery perfusion  - Instruct patient to report change in severity of symptoms  Outcome: Progressing     Problem: RESPIRATORY - ADULT  Goal: Achieves optimal ventilation and oxygenation  Description: INTERVENTIONS:  - Assess for changes in respiratory status  - Assess for changes in mentation and behavior  - Position to facilitate oxygenation and minimize respiratory effort  - Oxygen administered by appropriate delivery if ordered  - Initiate smoking cessation education as indicated  - Encourage broncho-pulmonary hygiene including cough, deep breathe, Incentive Spirometry  - Assess the need for suctioning and aspirate as needed  - Assess and instruct to report SOB or any respiratory difficulty  - Respiratory Therapy support as indicated  Outcome: Progressing

## 2024-05-22 NOTE — PROGRESS NOTES
Progress Note - Geriatric Medicine   Severiano Feliciano 88 y.o. male MRN: [id number]  Unit/Bed#: [place] [address] -01 Encounter: [id number]      Assessment/Plan:    Acute Encephalopathy  Presented to the hospital for altered mental status and shortness of breath   Baseline mentation is oriented x 3-4    Current cause considerations   Suspected to be multifactorial secondary to possible underlying cognitive impairment (he does have a documented history of dementia but son is unsure if this is an accurate diagnosis or when he was diagnosed), respiratory failure with hypoxia and hypercapnia, steroid use, recent ICU stay, history of encephalopathy, vision impairment, hearing impairment, and hospitalization  No UA obtained on this admission   No leukocytosis noted on labs today   Hemoglobin stable at 11.3 on labs today   Patient presented to the hospital for altered mental status   He did require two doses of lorazepam on 5/20 for increased confusion/agitation   Would avoid benzodiazepines if possible as these medications can worsen delirium   Patient does have a documented allergy to Zyprexa listed as tongue swelling   There is a cross-reaction with other antipsychotics so would avoid all antipsychotics   Patient was ordered Depakote last night (IV and oral orders linked) but patient/family refused per documentation   Nursing is unsure why this was refused   Patient is alert, awake, and oriented to person and place today   He does know his birth month is August   He is much more conversational today   Can adjust Depakote to PRN dosing as patient is improved and the medication was refused overnight   Identify and treat reversible causes of confusion including infection, dehydration, electrolyte imbalance, anemia, hypoxia, urinary retention, constipation, pain, and sleep disturbance  Maintain delirium precautions   Provide redirection, reorientation, and distraction techniques  Maintain fall and safety precautions   Assist  with ADLs/IADLs  Avoid deliriogenic medications such as tramadol, benzodiazepines, anticholinergics, benadryl  Treat pain using geriatric pain protocol   Encourage oral hydration and nutrition   Monitor for constipation and urinary retention   Implement sleep hygiene and limit night time interuptions   Maintain sleep-wake cycle   Encourage early and frequent mobilization   Redirect and reorient as needed    Keep mentally, physically, and socially active    Cognitive Screening   Patient appears to have a documented diagnosis of dementia, however, there is no workup including cognitive testing noted in epic  It is unclear when the official diagnosis was given but it was first documented as mild to moderate in May 2020  Patient's son was not aware yesterday of the dementia diagnosis and states that the patient is alert and oriented x 3-4 at baseline  Most recent TSH on 4/25/2024 noted to be 0.258  Most recent vitamin B12 level on 12/7/2023 noted to be 317  Would consider repeating on routine labs and supplementing for a level < 400   CT of the head on 5/19/2024 revealed moderate microangiopathic changes  Patient is alert and oriented to person, place, and birth month on exam today  Maintain delirium precautions as discussed above  Redirect and reorient as needed  Keep physically, mentally, and socially active    Deconditioning   Patient is at increased risk for deconditioning secondary to possible underlying cognitive impairment, respiratory failure with hypoxia and hypercapnia, weakness, gait dysfunction, and hospitalization  Continue to optimize diet, hydration, and mobility for healing  GFR 81 on labs today   Patient has no documented history of CKD   Keep hydrated   Congestive heart failure   Most recent echo on 4/11/2023 revealed an EF of 66%  No edema noted on exam today   Recommend low sodium diet   Continue to monitor weights and I&O  Monitor for signs and symptoms of infection, dehydration, DVT, and skin  breakdown    Frailty   Clinical Frail Scale: 6- Moderately Frail  Need help with all outside activities  Need help with stairs and bathing  May need assistance with dressing  Most recent albumin on 5/19/2024 noted to be 3.8  Consider nutrition consult  Encourage protein supplementation     Ambulatory Dysfunction/Falls  Patient has not had any recent falls at home   He ambulates with a cane at baseline   PT/OT consulted to assist with strengthening/mobility and assist with discharge planning to appropriate level of care  Assess patient frequently for physical needs, encourage use of assistant devices as needed and directed by PT/OT  Identify cognitive and physical deficits and behaviors that affect risk of falls  Consider moving patient closer to nursing station to monitor more closely for impulsive behavior which may increase risk of falls  [place] fall precautions   Educate patient/family on patient safety including physical limitations and importance of using call bell for assistance   Modify environment to reduce risk of injury including disconnecting from pole when not in use, ensuring adequate lighting in room and restroom, ensuring that path to restroom is clear and free of trip hazards  Out of bed as tolerated     Impaired Vision   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   He does wear eyeglasses at baseline   Recommend use of corrective lenses at all appropriate times  Encourage adequate lighting and encourage use of assistance with ambulation  Keep personal belongings close to avoid reaching  Encourage appropriate footwear at all times  Recommend large font for printed materials provided to patient    Impaired Hearing   Patient has a documented history of bilateral hearing loss   Son notes that he did undergo surgery for hearing loss and has been doing better with his hearing   He does not wear hearing aids at baseline   Hearing impairment strongly correlated with  depression, cognitive impairment, delirium and falls in the older adult  Use hearing aids or sound amplifier  Speak face to face  Use clear dictation and enunciation of words    Dentition/Appetite   Patient does not wear dentures   He has a good appetite at baseline   Per chart review, he has been eating well here   Ensure meal consistency is appropriate for all abilities   Consider nutrition consult   Continue aspiration precautions     Elimination   Patient is primarily continent of bowel and bladder at baseline  Son notes that when oxygen levels are low he can be incontinent at times   He does have some difficulty with constipation at times   He does take stool softeners at baseline   Last documented bowel movement was yesterday   Current bowel regimen includes   Senna 8.6 mg daily at bedtime   MiraLAX 17 g daily prn   Monitor for constipation and urinary retention     Insomnia   Patients son notes that the patient wakes up several times per night to use the bathroom   He states that the patient does sleep quite a bit during the day as well   He does not take any medication for sleep at baseline   Nursing notes no acute issues or events overnight though he did not wear his BiPAP  First line is behavioral therapy   Avoid sedative hypnotics including benzodiazepines and benadryl  Encourage staying awake during the day   Encourage daytime activities and morning exercise   Decrease or eliminate daytime naps   Avoid caffeine especially during late afternoon and evening hours  Establish a nighttime routine  Implement sleep hygiene and limit nighttime interruptions  Can consider melatonin 3 mg daily at bedtime for sleep if needed     Chronic Respiratory Failure with Hypoxia and Hypercapnia   Secondary to COPD with multiple prior hospitalizations  He does wear baseline 2 to 3 L of supplemental oxygen via nasal cannula  CO2 has been elevated and the patient has been refusing BiPAP at night  CO2 levels yesterday on VBG  were elevated at 76  No repeat level obtained today the patient is more alert and oriented today  Nursing notes the patient did not wear his BiPAP overnight  He remains on nebulizers Q 12 hours   Steroids have been initiated but have since been discontinued  Management per primary team    Chronic Obstructive Pulmonary Disease   Patient with severe COPD history  Follows with pulmonology in the outpatient setting and has been participating in pulmonary rehab  Patient suspected to have a mild COPD exacerbation on admission  Solu-Medrol was initiated but has since been discontinued  He remains on nebulizer treatments Q 12 hours  He does not appear to be in any acute distress on exam today  Oxygen saturation noted to be 94% on 4 L NC  He has been recommended for palliative care in the outpatient setting and during this admission, however, the patient and family have declined  Management per primary team    Home Medication Review   [facility] ([phone])  Losartan 25 mg BID   Pantoprazole 40 mg daily   Tylenol 650 mg Q 8 hours prn mild pain   Senna-S 8.6-50 mg daily   Albuterol inhaler 2 puffs Q 6 hours prn wheezing     [facility] ([phone])  Formoterol 20 mcg (2 mL) by nebulizer BID   Ipratropium-albuterol 3 mL BID         Subjective:   The patient is being seen and evaluated today at the bedside for geriatric follow-up. He is noted to be lying  in  bed comfortably in no acute distress. He is alert and oriented to person, place, and birth month. He is currently denying pain. He states he is feeling well today and offers no acute complaints.     Care was coordinated with patients nurse Derik. She notes no acute issues or events overnight. She states the patient did not wear BiPAP overnight.     Care was also coordinated with Dr. Mojica.       Review of Systems   Constitutional:  Positive for activity change. Negative for appetite change and fatigue.   HENT:  Negative for dental problem  "and hearing loss.    Eyes:  Positive for visual disturbance (glasses).   Respiratory:  Negative for cough and shortness of breath.    Cardiovascular:  Negative for chest pain and leg swelling.   Gastrointestinal:  Negative for abdominal distention and abdominal pain.   Genitourinary:  Negative for difficulty urinating and dysuria.   Musculoskeletal:  Positive for gait problem. Negative for arthralgias.   Skin:  Negative for color change and pallor.   Neurological:  Positive for weakness. Negative for speech difficulty.   Psychiatric/Behavioral:  Positive for confusion (improved). Negative for agitation and sleep disturbance. The patient is not nervous/anxious.          Objective:     Vitals: Blood pressure 167/85, pulse 69, temperature 98.4 °F (36.9 °C), resp. rate 20, height 5' 3\" (1.6 m), weight 62.2 kg (137 lb 2 oz), SpO2 96%.,Body mass index is 24.29 kg/m².      Intake/Output Summary (Last 24 hours) at 5/22/2024 0971  Last data filed at 5/22/2024 0962  Gross per 24 hour   Intake 670 ml   Output --   Net 670 ml       Current Medications: Reviewed    Physical Exam:   Physical Exam  Vitals and nursing note reviewed.   Constitutional:       General: He is not in acute distress.     Comments: Frail appearing elderly male   HENT:      Head: Normocephalic.      Mouth/Throat:      Mouth: Mucous membranes are dry.   Eyes:      General: No scleral icterus.     Conjunctiva/sclera: Conjunctivae normal.   Cardiovascular:      Rate and Rhythm: Normal rate and regular rhythm.   Pulmonary:      Effort: Pulmonary effort is normal. No respiratory distress.   Abdominal:      General: Bowel sounds are normal. There is no distension.      Palpations: Abdomen is soft.      Tenderness: There is no abdominal tenderness.   Musculoskeletal:         General: No swelling or tenderness.   Skin:     General: Skin is warm and dry.   Neurological:      General: No focal deficit present.      Mental Status: He is alert. He is disoriented.      " "Motor: Weakness present.      Gait: Gait abnormal.      Comments: Oriented to person, place, and birth month  Mentation improving          Invasive Devices       Peripheral Intravenous Line  Duration             Peripheral IV 05/19/24 Right Antecubital 3 days                    Lab, Imaging and other studies: I have personally reviewed pertinent reports.      Please note:  Voice-recognition software may have been used in the preparation of this document.  Occasional wrong word or \"sound-alike\" substitutions may have occurred due to the inherent limitations of voice recognition software.  Interpretation should be guided by context.    "

## 2024-05-22 NOTE — RESTORATIVE TECHNICIAN NOTE
Restorative Technician Note      Patient Name: Severiano Feliciano     Restorative Tech Visit Date: 05/22/24  Note Type: Mobility  Patient Position Upon Consult: Supine  Activity Performed: Range of motion; Dangled  Patient Position at End of Consult: Supine; All needs within reach

## 2024-05-22 NOTE — ASSESSMENT & PLAN NOTE
Wt Readings from Last 3 Encounters:   05/22/24 62.2 kg (137 lb 2 oz)   05/18/24 61.1 kg (134 lb 12.8 oz)   05/09/24 62.1 kg (137 lb)     Euvolemic  Continue daily weights, monitor intake and output

## 2024-05-22 NOTE — ASSESSMENT & PLAN NOTE
Patient is an 88-year-old male with a past medical history of severe COPD, chronic respiratory failure with hypoxia and hypercapnia, chronic diastolic heart failure, hyperlipidemia, history of DVT, descending aortic aneurysm, hypertension who presented to the hospital with shortness of breath.  Patient had multiple admissions in the past for COPD exacerbation.  Scented to the ED with altered mental status  Found to have acute on chronic hypoxic and hypercapnic respiratory failure, admitted to ICU on BiPAP  CT chest abdomen pelvis.  Emphysema, scattered subsegmental atelectasis  For possible mild exacerbation started on Solu-Medrol and bronchodilators with improvement  In the ICU steroids were stopped, continue the nebulizer treatments  According to last pulmonology note he has no O2 needs at rest and 3 L with exertion  He is currently on 5 L of oxygen but saturating in the high 90s  Goal oxygen saturation 89 to 92%, titrate oxygen down  Patient's primary pulmonologist recommended palliative care evaluation for severe COPD, patient and family not ready  Order for mask fitting was placed.  Per family he is not tolerating his home BiPAP mask.

## 2024-05-22 NOTE — PROGRESS NOTES
[facility]  Progress Note  Name: Severiano Feliciano I  MRN: [id number]  Unit/Bed#: [place] [address] -01 I Date of Admission: 5/19/2024   Date of Service: 5/22/2024 I Hospital Day: 3    Assessment & Plan   * Chronic obstructive pulmonary disease, unspecified COPD type (HCC)  Assessment & Plan  Patient is an 88-year-old male with a past medical history of severe COPD, chronic respiratory failure with hypoxia and hypercapnia, chronic diastolic heart failure, hyperlipidemia, history of DVT, descending aortic aneurysm, hypertension who presented to the hospital with shortness of breath.  Patient had multiple admissions in the past for COPD exacerbation.  Scented to the ED with altered mental status  Found to have acute on chronic hypoxic and hypercapnic respiratory failure, admitted to ICU on BiPAP  CT chest abdomen pelvis.  Emphysema, scattered subsegmental atelectasis  For possible mild exacerbation started on Solu-Medrol and bronchodilators with improvement  In the ICU steroids were stopped, continue the nebulizer treatments  According to last pulmonology note he has no O2 needs at rest and 3 L with exertion  He is currently on 5 L of oxygen but saturating in the high 90s  Goal oxygen saturation 89 to 92%, titrate oxygen down  Patient's primary pulmonologist recommended palliative care evaluation for severe COPD, patient and family not ready  Order for mask fitting was placed.  Per family he is not tolerating his home BiPAP mask.    Chronic respiratory failure with hypoxia and hypercapnia (HCC)  Assessment & Plan  Chronic hypoxic and hypercapnic respiratory failure secondary to very severe COPD with multiple admissions  Currently on 5 L of oxygen saturating in the high 90s, weaned down to oxygen saturation 89 to 92%  Steroids were stopped in the ICU  Continue nebulizer treatments    Encephalopathy acute  Assessment & Plan  Suspect secondary to acute on chronic hypoxic and hypercapnic  respiratory failure  Labs stable, hypercapnic on VBG but pH normal  CT head showed no acute intracranial abnormality  Encourage BiPAP at bedtime and prn with naps  Geriatrics on board, appreciate recommendations    Abnormal CT of the abdomen  Assessment & Plan  Bilateral nonobstructing renal calculi  Cholelithiasis without evidence of acute cholecystitis  Colonic diverticulosis without evidence of acute diverticulitis  Monitor    Chronic diastolic (congestive) heart failure (HCC)  Assessment & Plan  Wt Readings from Last 3 Encounters:   05/22/24 62.2 kg (137 lb 2 oz)   05/18/24 61.1 kg (134 lb 12.8 oz)   05/09/24 62.1 kg (137 lb)     Euvolemic  Continue daily weights, monitor intake and output          Hyperlipidemia  Assessment & Plan  Continue statin    Benign essential hypertension  Assessment & Plan  Continue losartan  Hydralazine as needed    History of DVT (deep vein thrombosis)  Assessment & Plan  Continue cutaneous heparin and SCDs    Descending aortic aneurysm (HCC)  Assessment & Plan  Continue outpatient follow-up               VTE Pharmacologic Prophylaxis:   Moderate Risk (Score 3-4) - Pharmacological DVT Prophylaxis Ordered: heparin.    Mobility:   Basic Mobility Inpatient Raw Score: 13  JH-HLM Goal: 4: Move to chair/commode  JH-HLM Achieved: 2: Bed activities/Dependent transfer  JH-HLM Goal NOT achieved. Continue with multidisciplinary rounding and encourage appropriate mobility to improve upon JH-HLM goals.    Patient Centered Rounds: I performed bedside rounds with nursing staff today.   Discussions with Specialists or Other Care Team Provider: Regino, case management, pulmonology    Education and Discussions with Family / Patient:  Updated wife at bedside.  Tried to call waldo Bocanegra, did not  the phone.       Current Length of Stay: 3 day(s)  Current Patient Status: Inpatient   Certification Statement: The patient will continue to require additional inpatient hospital stay due to pending PT OT  eval  Discharge Plan: Anticipate discharge tomorrow to discharge location to be determined pending rehab evaluations.    Code Status: Level 1 - Full Code    Subjective:   Patient was seen and evaluated bedside.  He is more awake and alert today, per family back to his baseline.    Objective:     Vitals:   Temp (24hrs), Av.7 °F (37.1 °C), Min:98.4 °F (36.9 °C), Max:99.2 °F (37.3 °C)    Temp:  [98.4 °F (36.9 °C)-99.2 °F (37.3 °C)] 99.2 °F (37.3 °C)  HR:  [68-76] 76  Resp:  [20-24] 24  BP: (162-167)/(83-85) 162/83  SpO2:  [96 %-100 %] 99 %  Body mass index is 24.29 kg/m².     Input and Output Summary (last 24 hours):     Intake/Output Summary (Last 24 hours) at 2024 1550  Last data filed at 2024 0925  Gross per 24 hour   Intake 430 ml   Output --   Net 430 ml       Physical Exam:   Physical Exam  Constitutional:       General: He is not in acute distress.  HENT:      Head: Atraumatic.   Cardiovascular:      Rate and Rhythm: Normal rate and regular rhythm.      Heart sounds: No murmur heard.  Pulmonary:      Effort: Pulmonary effort is normal.      Breath sounds: Normal breath sounds.   Abdominal:      General: Bowel sounds are normal. There is no distension.      Palpations: Abdomen is soft.      Tenderness: There is no abdominal tenderness.   Musculoskeletal:         General: No swelling.      Cervical back: Neck supple.   Skin:     General: Skin is warm and dry.   Neurological:      General: No focal deficit present.      Mental Status: He is alert.   Psychiatric:         Mood and Affect: Mood normal.          Additional Data:     Labs:  Results from last 7 days   Lab Units 24  0440 24  0519 24  0424   WBC Thousand/uL 6.55   < > 4.41   HEMOGLOBIN g/dL 11.3*   < > 11.1*   HEMATOCRIT % 38.2   < > 37.4   PLATELETS Thousands/uL 138*   < > 134*   SEGS PCT %  --   --  86*   LYMPHO PCT %  --   --  11*   MONO PCT %  --   --  3*   EOS PCT %  --   --  0    < > = values in this interval not  displayed.     Results from last 7 days   Lab Units 05/22/24  0440 05/20/24  0424 05/19/24  0801   SODIUM mmol/L 144   < > 144   POTASSIUM mmol/L 4.0   < > 4.1   CHLORIDE mmol/L 98   < > 99   CO2 mmol/L 45*   < > >45*   BUN mg/dL 23   < > 18   CREATININE mg/dL 0.79   < > 0.84   ANION GAP mmol/L 1*   < >  --    CALCIUM mg/dL 9.1   < > 9.2   ALBUMIN g/dL  --   --  3.8   TOTAL BILIRUBIN mg/dL  --   --  0.55   ALK PHOS U/L  --   --  70   ALT U/L  --   --  35   AST U/L  --   --  32   GLUCOSE RANDOM mg/dL 85   < > 108    < > = values in this interval not displayed.     Results from last 7 days   Lab Units 05/19/24  0801   INR  1.07     Results from last 7 days   Lab Units 05/19/24  0756   POC GLUCOSE mg/dl 101         Results from last 7 days   Lab Units 05/19/24  0801   LACTIC ACID mmol/L 0.5   PROCALCITONIN ng/ml 0.13       Lines/Drains:  Invasive Devices       Peripheral Intravenous Line  Duration             Peripheral IV 05/19/24 Right Antecubital 3 days                          Imaging: Reviewed radiology reports from this admission including: chest xray    Recent Cultures (last 7 days):   Results from last 7 days   Lab Units 05/19/24  0844 05/19/24  0800   BLOOD CULTURE  No Growth at 72 hrs. No Growth at 72 hrs.       Last 24 Hours Medication List:   Current Facility-Administered Medications   Medication Dose Route Frequency Provider Last Rate    acetaminophen  975 mg Oral Q6H PRN Buster Nev[place] MD      aluminum-magnesium hydroxide-simethicone  30 mL Oral Q4H PRN Calin Chacon PA-C      atorvastatin  20 mg Oral Daily With Dinner Buster Neville MD      budesonide  0.5 mg Nebulization Q12H Buster Neville MD      divalproex sodium  250 mg Oral HS RAQUEL Hudson      Or    valproate sodium  250 mg Intravenous HS RAQUEL Hudson      formoterol  20 mcg Nebulization Q12H Buster Neville MD      heparin (porcine)  5,000 Units Subcutaneous Q8H [facility] Buster Neville MD      hydrALAZINE  10 mg Intravenous Q6H PRN Buster  MD Jamin      levalbuterol  1.25 mg Nebulization Q8H PRN Buster Neville MD      losartan  25 mg Oral BID Buster Neville MD      melatonin  3 mg Oral HS Calin Chacon PA-C      pantoprazole  40 mg Oral Early Morning Buster Neville MD      polyethylene glycol  17 g Oral Daily PRN Calin Chacon PA-C      senna  1 tablet Oral HS Buster Neville MD          Today, Patient Was Seen By: Liliana Mojica MD    **Please Note: This note may have been constructed using a voice recognition system.**

## 2024-05-22 NOTE — PLAN OF CARE
Problem: Potential for Falls  Goal: Patient will remain free of falls  Description: INTERVENTIONS:  - Educate patient/family on patient safety including physical limitations  - Instruct patient to call for assistance with activity   - Consult OT/PT to assist with strengthening/mobility   - Keep Call bell within reach  - Keep bed low and locked with side rails adjusted as appropriate  - Keep care items and personal belongings within reach  - Initiate and maintain comfort rounds  - Make Fall Risk Sign visible to staff  - Offer Toileting every 2 Hours, in advance of need  - Initiate/Maintain bed alarm  - Obtain necessary fall risk management equipment: non-skid footware.  - Apply yellow socks and bracelet for high fall risk patients  - Consider moving patient to room near nurses station  Outcome: Progressing     Problem: PAIN - ADULT  Goal: Verbalizes/displays adequate comfort level or baseline comfort level  Description: Interventions:  - Encourage patient to monitor pain and request assistance  - Assess pain using appropriate pain scale  - Administer analgesics based on type and severity of pain and evaluate response  - Implement non-pharmacological measures as appropriate and evaluate response  - Consider cultural and social influences on pain and pain management  - Notify physician/advanced practitioner if interventions unsuccessful or patient reports new pain  Outcome: Progressing     Problem: INFECTION - ADULT  Goal: Absence or prevention of progression during hospitalization  Description: INTERVENTIONS:  - Assess and monitor for signs and symptoms of infection  - Monitor lab/diagnostic results  - Monitor all insertion sites, i.e. indwelling lines, tubes, and drains  - Monitor endotracheal if appropriate and nasal secretions for changes in amount and color  - [place] appropriate cooling/warming therapies per order  - Administer medications as ordered  - Instruct and encourage patient and family to use good  hand hygiene technique  - Identify and instruct in appropriate isolation precautions for identified infection/condition  Outcome: Progressing  Goal: Absence of fever/infection during neutropenic period  Description: INTERVENTIONS:  - Monitor WBC     Outcome: Progressing     Problem: SAFETY ADULT  Goal: Patient will remain free of falls  Description: INTERVENTIONS:  - Educate patient/family on patient safety including physical limitations  - Instruct patient to call for assistance with activity   - Consult OT/PT to assist with strengthening/mobility   - Keep Call bell within reach  - Keep bed low and locked with side rails adjusted as appropriate  - Keep care items and personal belongings within reach  - Initiate and maintain comfort rounds  - Make Fall Risk Sign visible to staff  - Offer Toileting every 2 Hours, in advance of need  - Initiate/Maintain bed alarm  - Obtain necessary fall risk management equipment: non-skid footwear.  - Apply yellow socks and bracelet for high fall risk patients  - Consider moving patient to room near nurses station  Outcome: Progressing  Goal: Maintain or return to baseline ADL function  Description: INTERVENTIONS:  -  Assess patient's ability to carry out ADLs; assess patient's baseline for ADL function and identify physical deficits which impact ability to perform ADLs (bathing, care of mouth/teeth, toileting, grooming, dressing, etc.)  - Assess/evaluate cause of self-care deficits   - Assess range of motion  - Assess patient's mobility; develop plan if impaired  - Assess patient's need for assistive devices and provide as appropriate  - Encourage maximum independence but intervene and supervise when necessary  - Involve family in performance of ADLs  - Assess for home care needs following discharge   - Consider OT consult to assist with ADL evaluation and planning for discharge  - Provide patient education as appropriate  Outcome: Progressing  Goal: Maintains/Returns to pre  admission functional level  Description: INTERVENTIONS:  - Perform AM-PAC 6 Click Basic Mobility/ Daily Activity assessment daily.  - Set and communicate daily mobility goal to care team and patient/family/caregiver.   - Collaborate with rehabilitation services on mobility goals if consulted  - Perform Range of Motion 3 times a day.  - Reposition patient every 2 hours.  - Dangle patient 3 times a day  - Stand patient 3 times a day  - Ambulate patient 3 times a day  - Out of bed to chair 3 times a day   - Out of bed for meals 3 times a day  - Out of bed for toileting  - Record patient progress and toleration of activity level   Outcome: Progressing     Problem: DISCHARGE PLANNING  Goal: Discharge to home or other facility with appropriate resources  Description: INTERVENTIONS:  - Identify barriers to discharge w/patient and caregiver  - Arrange for needed discharge resources and transportation as appropriate  - Identify discharge learning needs (meds, wound care, etc.)  - Arrange for interpretive services to assist at discharge as needed  - Refer to Case Management Department for coordinating discharge planning if the patient needs post-hospital services based on physician/advanced practitioner order or complex needs related to functional status, cognitive ability, or social support system  Outcome: Progressing     Problem: Knowledge Deficit  Goal: Patient/family/caregiver demonstrates understanding of disease process, treatment plan, medications, and discharge instructions  Description: Complete learning assessment and assess knowledge base.  Interventions:  - Provide teaching at level of understanding  - Provide teaching via preferred learning methods  Outcome: Progressing     Problem: Prexisting or High Potential for Compromised Skin Integrity  Goal: Skin integrity is maintained or improved  Description: INTERVENTIONS:  - Identify patients at risk for skin breakdown  - Assess and monitor skin integrity  - Assess  and monitor nutrition and hydration status  - Monitor labs   - Assess for incontinence   - Turn and reposition patient  - Assist with mobility/ambulation  - Relieve pressure over bony prominences  - Avoid friction and shearing  - Provide appropriate hygiene as needed including keeping skin clean and dry  - Evaluate need for skin moisturizer/barrier cream  - Collaborate with interdisciplinary team   - Patient/family teaching  - Consider wound care consult   Outcome: Progressing     Problem: CARDIOVASCULAR - ADULT  Goal: Maintains optimal cardiac output and hemodynamic stability  Description: INTERVENTIONS:  - Monitor I/O, vital signs and rhythm  - Monitor for S/S and trends of decreased cardiac output  - Administer and titrate ordered vasoactive medications to optimize hemodynamic stability  - Assess quality of pulses, skin color and temperature  - Assess for signs of decreased coronary artery perfusion  - Instruct patient to report change in severity of symptoms  Outcome: Progressing     Problem: RESPIRATORY - ADULT  Goal: Achieves optimal ventilation and oxygenation  Description: INTERVENTIONS:  - Assess for changes in respiratory status  - Assess for changes in mentation and behavior  - Position to facilitate oxygenation and minimize respiratory effort  - Oxygen administered by appropriate delivery if ordered  - Initiate smoking cessation education as indicated  - Encourage broncho-pulmonary hygiene including cough, deep breathe, Incentive Spirometry  - Assess the need for suctioning and aspirate as needed  - Assess and instruct to report SOB or any respiratory difficulty  - Respiratory Therapy support as indicated  Outcome: Progressing

## 2024-05-23 ENCOUNTER — APPOINTMENT (OUTPATIENT)
Dept: PULMONOLOGY | Facility: CLINIC | Age: 89
End: 2024-05-23
Payer: MEDICARE

## 2024-05-23 VITALS
HEART RATE: 72 BPM | BODY MASS INDEX: 24.26 KG/M2 | OXYGEN SATURATION: 96 % | TEMPERATURE: 98.9 F | RESPIRATION RATE: 18 BRPM | HEIGHT: 63 IN | WEIGHT: 136.91 LBS | DIASTOLIC BLOOD PRESSURE: 79 MMHG | SYSTOLIC BLOOD PRESSURE: 149 MMHG

## 2024-05-23 PROCEDURE — 99239 HOSP IP/OBS DSCHRG MGMT >30: CPT | Performed by: INTERNAL MEDICINE

## 2024-05-23 PROCEDURE — 97166 OT EVAL MOD COMPLEX 45 MIN: CPT

## 2024-05-23 PROCEDURE — 94640 AIRWAY INHALATION TREATMENT: CPT

## 2024-05-23 PROCEDURE — 94760 N-INVAS EAR/PLS OXIMETRY 1: CPT

## 2024-05-23 RX ADMIN — LOSARTAN POTASSIUM 25 MG: 25 TABLET, FILM COATED ORAL at 09:43

## 2024-05-23 RX ADMIN — HEPARIN SODIUM 5000 UNITS: 5000 INJECTION INTRAVENOUS; SUBCUTANEOUS at 05:06

## 2024-05-23 RX ADMIN — BUDESONIDE 0.5 MG: 0.5 INHALANT RESPIRATORY (INHALATION) at 07:25

## 2024-05-23 RX ADMIN — PANTOPRAZOLE SODIUM 40 MG: 40 TABLET, DELAYED RELEASE ORAL at 05:06

## 2024-05-23 RX ADMIN — FORMOTEROL FUMARATE 20 MCG: 20 SOLUTION RESPIRATORY (INHALATION) at 07:25

## 2024-05-23 NOTE — PROGRESS NOTES
Pulmonary Rehabilitation Plan of Care   Reassessment      Today's date: 2024   # of Exercise Sessions Completed: 23  Patient name: Severiano Feliciano      : 1935  Age: 88 y.o.       MRN: [id number]  Referring Physician: Buddy Hayward MD  Pulmonologist: Buddy Hayward MD    Provider: [place]  Clinician: Erin Gabriel MS, CEP    Dx:    Encounter Diagnosis   Name Primary?    Chronic obstructive pulmonary disease, unspecified COPD type (HCC) Yes     Date of onset: 2024      SUMMARY OF PROGRESS: Severiano has completed 3 sessions in the last 30 days. He returned to pulmonary rehab exercise on , after being hospitalized - for COPD exacerbation. However, Severiano went back into the hospital on  and is currently still admitted. He will return to exercise after discharge and physician clearance to complete 36 total sessions.Communication continues to be difficult with Severiano, not only with the language barrier but also due to severe hearing loss and he often does not wear his hearing aids.   Severiano tolerates 40-42 mins of exercise at 1.7-2.9 METs on 3LO2. O2 is occ increased to 4L at rest, because SpO2 ranges from 82-89% right after walking in to rehab. Normal resting with normal increase to exercise. Occ elevated resting BP Resting //90. Severiano has lost 6 lbs ,without intention, since his initial evaluation and his family is in charge of his meals at home. No concern of anxiety or depression at this time. Severiano will continue to be progressed upon his return to reach his goals.   Patient specific goals include: become more independent and move more at home.    Medication compliance: Yes   Comments: Pt reports to be compliant with medications    Fall Risk: High   Comments: Patient uses walking assist device (walker/cane/rollator) and Denies a fall in the past 6 months    Smoking: Former user  Quit 20 years ago    Pulmonary Disease Risk  Factors:  Occupational exposure to workplace  fumes    RPD at Rest: 0/10  RPD with Exercise: 1-3/10    Assessment of progression of lung disease and functional status:  CAT: not assessed  Shortness of breath questionnaire: 54/120      EXERCISE ASSESSMENT and PLAN    Current Exercise Program in Rehab:       Frequency: 2 days/week   Supplement with home exercise 2+ days/wk as tolerated        Minutes: 40-42         METS: 1.7-2.9              SpO2: 89-95%              RPD: 1-3                      HR:    RPE: 4-5         Modalities: UBE, NuStep, Recumbent bike, and Room walking      Exercise Progression 30 Day Goals :    Frequency: 2 days/week    Supplement with home exercise 2+ days/wk as tolerated       Minutes: 40-45        METS: 2.5-3.2              SpO2: >88%              RPD: 4-6                      HR:    RPE: 4-5        Modalities: UBE, NuStep, Recumbent bike, and Room walking     Strength trainin-3 days / week  12-15 repetitions  1-2 sets per modality    Modalities: Sit to Stands, Calf Raises, and leg extensions    Home Exercise: none    Education: pursed lipped breathing, fall prevention while using nasal canula tubing, relaxation breathing, benefits of exercise for pulmonary disease, RPD scale, O2 saturation monitoring, and appropriate O2 response to exercise    SMART Goals:   improved 6MWT distance, reduced dyspnea during exercise, improved exercise tolerance based on peak METs tolerated in pulmonary rehab exercise session, and SpO2 >88% during exercise    Patient Specific EXERCISE GOALS:   (home exercise, ADLs): reduced dependence on supplemental O2, attend pulmonary rehab regularly, decrease sitting time at home, start a walking program, reduced pulmonary related hospital readmissions , increased muscular strength, increased energy, increased stamina with ADLs, and more independence at home    Patient's progress toward SMART and personal goals: Completed 3 exercise sessions in the last  30 days; currently hospitalized    Patient's goals for next 30 days: hospital discharged with f/u appointment scheduled with physician; return to pulmonary rehab exercise 2x/week with adjusted durations and intensities    Plan: Titrate supplemental oxygen as needed to maintain SpO2>88% with exercise, reduce time sitting at home, and utilize PLB with physical activity    Readiness to change: Preparation:  (Getting ready to change)       NUTRITION ASSESSMENT AND PLAN    Weight control:    Starting weight: 137 lbs   Current weight: 131.6 lbs    Diabetes: N/A    SMART Goals:   eat 6 or more servings of grain products per day, eat 5 or more servings of fruits and vegetables a day, rarely eat processed meats or eat low fat processed meats, Do not eat fried foods, choose healthy snacks such as fruit, pretzels, and low fat crackers, choose healthy desserts and sweets such as patrice food cake or  fruit, choose low sugar desserts and sweets, and drink less than 8oz of soda and sweetened drinks per day      Patient Specific NUTRITION GOALS:  (wt control, diabetes management, dietary modifications): improve hydration increased muscle mass    Patient's progress toward SMART and personal goals: Patient has lost 6 lbs without intention    Patient's goals for next 30 days: reduce sweets and increase healthy fats for healthy weight gain and muscle mass    Measurable goals were based Rate Your Plate Dietary Self-Assessment. These are the areas in which the patient could score higher on the assessment.  Goals include recommendations for a heart healthy diet based on American Heart Association.    Education: heart healthy eating principles    Plan: group Class: Heart Healthy Eating, replace refined flours with whole grains, increase daily intake of fruits and vegetables, increase daily intake of low fat dairy, eat healthy snacks like fruit, pretzels, and low fat crackers, and choose desserts such as fruit, patrice food cake, low-fat or  fat-free sweets    Readiness to change: Preparation:  (Getting ready to change)       PSYCHOSOCIAL ASSESSMENT AND PLAN    Emotional:  Depression assessment:  PHQ-9 = 9  5-9 = Mild Depression            Anxiety measure:  LILA-7 = 6  5-9 = Mild anxiety    Assessment of depression and anxiety    Patient's son denies anxiety and depression on patient's behalf    Self-reported stress level:  2  Stress Management: Practice Relaxation Techniques, Exercise, and Enjoy a hobby    Patient's rating of Social support: Very Good   Social Support Network: children, grandchildren, and spouse    Psychosocial Assessment as it relates to rehabilitation: Patient denies issues with his/her family or home life that may affect their rehabilitation efforts.       SMART Goals:    PHQ-9 - reduced severity by one level, Physical Fitness in [place] Score < 3, Daily Activity in [facility] Score < 3, Pain in [place] Score < 3, Overall Health in [facility] Score < 3, [facility] Score < 3 , Change in Health in [facility] Score < 3 ,  Increased interest and pleasure in doing things, and feel less tired with more energy    Patient Specific PSYCHOCOSOCIAL GOALS:  (stress, emotional well being, social support): spend time with family    Patient's progress toward SMART and personal goals: enjoys coming to rehab and family reports slight improvement in independence    Patient's goals for next 30 days: hospital discharge and return to pul rehab; Be more active and increase energy level in return      Education: benefits of a positive support system, stress management techniques, and depression and pulmonary disease    Plan: Exercise, Reduce dependence  on family, Return to previous social activity, and Avoid triggers    Readiness to change: Preparation:  (Getting ready to change)       OTHER CORE COMPONENTS     Tobacco:   Social History     Tobacco Use   Smoking Status Former    Current packs/day: 0.00    Average packs/day: 1 pack/day  for 35.0 years (35.0 ttl pk-yrs)    Types: Cigarettes    Start date:     Quit date:     Years since quittin.4   Smokeless Tobacco Never       Tobacco Use Intervention: Referral to tobacco expert:   Pt quit 20 years ago   and has abstained    Blood pressure:    Restin//90    Oxygen needs:   Rest:  supplemental O2 via nasal cannula @ 3L/min   Exercise/physical activity:  supplemental O2 via nasal cannula @ 3-4L/min   Sleep:   supplemental O2 via nasal cannula @ 3L/min     Oxygen Goal: Maintain SpO2>88% during exercise    SMART Goals: reduced dietary sodium <2000mg and medication compliance    Patient Specific CORE COMPONENT GOALS (smoking, BP control, angina control, medication compliance): Take inhalers properly to ensure he is receiving the medication    Patient's progress toward SMART and personal goals: Occ elevated resting BP; SpO2 often drops, especially when jsut arriving to rehab- increase O2 to 4L.    Patient's goals for next 30 days: hospital discharge and return to pulm rehab; Continue to utilize supplemental O2 at 3L with exercise and increase to 4L to maintain SpO2 >88%; ensure 100% medication compliance.     Education:  pathophysiology of pulmonary disease, inspiratory muscle training, and education: Pulmonary Anatomy and Physiology    Plan: medication compliance, engage in regular exercise, and monitor home BP    Readiness to change: Action:  (Changing behavior)

## 2024-05-23 NOTE — ASSESSMENT & PLAN NOTE
Bilateral nonobstructing renal calculi  Cholelithiasis without evidence of acute cholecystitis  Colonic diverticulosis without evidence of acute diverticulitis  No complaints

## 2024-05-23 NOTE — PLAN OF CARE
Problem: Potential for Falls  Goal: Patient will remain free of falls  Description: INTERVENTIONS:  - Educate patient/family on patient safety including physical limitations  - Instruct patient to call for assistance with activity   - Consult OT/PT to assist with strengthening/mobility   - Keep Call bell within reach  - Keep bed low and locked with side rails adjusted as appropriate  - Keep care items and personal belongings within reach  - Initiate and maintain comfort rounds  - Make Fall Risk Sign visible to staff  - Apply yellow socks and bracelet for high fall risk patients  - Consider moving patient to room near nurses station  Outcome: Progressing     Problem: PAIN - ADULT  Goal: Verbalizes/displays adequate comfort level or baseline comfort level  Description: Interventions:  - Encourage patient to monitor pain and request assistance  - Assess pain using appropriate pain scale  - Administer analgesics based on type and severity of pain and evaluate response  - Implement non-pharmacological measures as appropriate and evaluate response  - Consider cultural and social influences on pain and pain management  - Notify physician/advanced practitioner if interventions unsuccessful or patient reports new pain  Outcome: Progressing     Problem: INFECTION - ADULT  Goal: Absence or prevention of progression during hospitalization  Description: INTERVENTIONS:  - Assess and monitor for signs and symptoms of infection  - Monitor lab/diagnostic results  - Monitor all insertion sites, i.e. indwelling lines, tubes, and drains  - Monitor endotracheal if appropriate and nasal secretions for changes in amount and color  - [place] appropriate cooling/warming therapies per order  - Administer medications as ordered  - Instruct and encourage patient and family to use good hand hygiene technique  - Identify and instruct in appropriate isolation precautions for identified infection/condition  Outcome: Progressing  Goal: Absence  of fever/infection during neutropenic period  Description: INTERVENTIONS:  - Monitor WBC    Outcome: Progressing     Problem: SAFETY ADULT  Goal: Patient will remain free of falls  Description: INTERVENTIONS:  - Educate patient/family on patient safety including physical limitations  - Instruct patient to call for assistance with activity   - Consult OT/PT to assist with strengthening/mobility   - Keep Call bell within reach  - Keep bed low and locked with side rails adjusted as appropriate  - Keep care items and personal belongings within reach  - Initiate and maintain comfort rounds  - Make Fall Risk Sign visible to staff  - Apply yellow socks and bracelet for high fall risk patients  - Consider moving patient to room near nurses station  Outcome: Progressing  Goal: Maintain or return to baseline ADL function  Description: INTERVENTIONS:  -  Assess patient's ability to carry out ADLs; assess patient's baseline for ADL function and identify physical deficits which impact ability to perform ADLs (bathing, care of mouth/teeth, toileting, grooming, dressing, etc.)  - Assess/evaluate cause of self-care deficits   - Assess range of motion  - Assess patient's mobility; develop plan if impaired  - Assess patient's need for assistive devices and provide as appropriate  - Encourage maximum independence but intervene and supervise when necessary  - Involve family in performance of ADLs  - Assess for home care needs following discharge   - Consider OT consult to assist with ADL evaluation and planning for discharge  - Provide patient education as appropriate  Outcome: Progressing  Goal: Maintains/Returns to pre admission functional level  Description: INTERVENTIONS:  - Perform AM-PAC 6 Click Basic Mobility/ Daily Activity assessment daily.  - Set and communicate daily mobility goal to care team and patient/family/caregiver.   - Collaborate with rehabilitation services on mobility goals if consulted  - Perform Range of Motion  3 times a day.  - Reposition patient every 2 hours.  - Dangle patient 3 times a day  - Stand patient 3 times a day  - Ambulate patient 3 times a day  - Out of bed to chair 3 times a day   - Out of bed for meals 3 times a day  - Out of bed for toileting  - Record patient progress and toleration of activity level   Outcome: Progressing     Problem: DISCHARGE PLANNING  Goal: Discharge to home or other facility with appropriate resources  Description: INTERVENTIONS:  - Identify barriers to discharge w/patient and caregiver  - Arrange for needed discharge resources and transportation as appropriate  - Identify discharge learning needs (meds, wound care, etc.)  - Arrange for interpretive services to assist at discharge as needed  - Refer to Case Management Department for coordinating discharge planning if the patient needs post-hospital services based on physician/advanced practitioner order or complex needs related to functional status, cognitive ability, or social support system  Outcome: Progressing     Problem: Knowledge Deficit  Goal: Patient/family/caregiver demonstrates understanding of disease process, treatment plan, medications, and discharge instructions  Description: Complete learning assessment and assess knowledge base.  Interventions:  - Provide teaching at level of understanding  - Provide teaching via preferred learning methods  Outcome: Progressing     Problem: Prexisting or High Potential for Compromised Skin Integrity  Goal: Skin integrity is maintained or improved  Description: INTERVENTIONS:  - Identify patients at risk for skin breakdown  - Assess and monitor skin integrity  - Assess and monitor nutrition and hydration status  - Monitor labs   - Assess for incontinence   - Turn and reposition patient  - Assist with mobility/ambulation  - Relieve pressure over bony prominences  - Avoid friction and shearing  - Provide appropriate hygiene as needed including keeping skin clean and dry  - Evaluate need  for skin moisturizer/barrier cream  - Collaborate with interdisciplinary team   - Patient/family teaching  - Consider wound care consult   Outcome: Progressing     Problem: CARDIOVASCULAR - ADULT  Goal: Maintains optimal cardiac output and hemodynamic stability  Description: INTERVENTIONS:  - Monitor I/O, vital signs and rhythm  - Monitor for S/S and trends of decreased cardiac output  - Administer and titrate ordered vasoactive medications to optimize hemodynamic stability  - Assess quality of pulses, skin color and temperature  - Assess for signs of decreased coronary artery perfusion  - Instruct patient to report change in severity of symptoms  Outcome: Progressing     Problem: RESPIRATORY - ADULT  Goal: Achieves optimal ventilation and oxygenation  Description: INTERVENTIONS:  - Assess for changes in respiratory status  - Assess for changes in mentation and behavior  - Position to facilitate oxygenation and minimize respiratory effort  - Oxygen administered by appropriate delivery if ordered  - Initiate smoking cessation education as indicated  - Encourage broncho-pulmonary hygiene including cough, deep breathe, Incentive Spirometry  - Assess the need for suctioning and aspirate as needed  - Assess and instruct to report SOB or any respiratory difficulty  - Respiratory Therapy support as indicated  Outcome: Progressing

## 2024-05-23 NOTE — ASSESSMENT & PLAN NOTE
Wt Readings from Last 3 Encounters:   05/23/24 62.1 kg (136 lb 14.5 oz)   05/18/24 61.1 kg (134 lb 12.8 oz)   05/09/24 62.1 kg (137 lb)     Euvolemic

## 2024-05-23 NOTE — DISCHARGE SUMMARY
[facility]  Discharge- Severiano Feliciano 1935, 88 y.o. male MRN: [id number]  Unit/Bed#: [place] [address] -01 Encounter: [id number]  Primary Care Provider: Kirby Casanova MD   Date and time admitted to hospital: 5/19/2024  7:39 AM    * Chronic obstructive pulmonary disease, unspecified COPD type (HCC)  Assessment & Plan  Patient is an 88-year-old male with a past medical history of severe COPD, chronic respiratory failure with hypoxia and hypercapnia, chronic diastolic heart failure, hyperlipidemia, history of DVT, descending aortic aneurysm, hypertension who presented to the hospital with shortness of breath.  Patient had multiple admissions in the past for COPD exacerbation.  Presented to the ED with altered mental status  Found to have acute on chronic hypoxic and hypercapnic respiratory failure, admitted to ICU on BiPAP  CT chest abdomen pelvis.  Emphysema, scattered subsegmental atelectasis  For possible mild exacerbation started on Solu-Medrol and bronchodilators with improvement  In the ICU steroids were stopped, he is not in exacerbation, continue the nebulizer treatments  He is currently on 2 L of oxygen, saturating in the mid 90s  Continue Pulmicort, Perforomist and DuoNeb, home regimen  Goal oxygen saturation 89 to 92%, titrate oxygen down  Patient's primary pulmonologist recommended palliative care evaluation for severe COPD, patient and family not ready  Order for mask fitting was placed.  Per family he is not tolerating his home BiPAP mask.  Might benefit from nasal pillow    Chronic respiratory failure with hypoxia and hypercapnia (HCC)  Assessment & Plan  Chronic hypoxic and hypercapnic respiratory failure secondary to very severe COPD with multiple admissions  Currently on 4 L of oxygen saturating in the mid 90s, wean down to oxygen saturation 89 to 92%  Steroids were stopped in the ICU  Continue nebulizer treatments    Encephalopathy acute  Assessment & Plan  Suspect  secondary to acute on chronic hypoxic and hypercapnic respiratory failure  Labs stable, hypercapnic on VBG but pH normal  CT head showed no acute intracranial abnormality  Encourage BiPAP at bedtime and prn with naps  Geriatrics on board, appreciate recommendations  Mentation back to baseline per family    Abnormal CT of the abdomen  Assessment & Plan  Bilateral nonobstructing renal calculi  Cholelithiasis without evidence of acute cholecystitis  Colonic diverticulosis without evidence of acute diverticulitis  No complaints    Chronic diastolic (congestive) heart failure (HCC)  Assessment & Plan  Wt Readings from Last 3 Encounters:   05/23/24 62.1 kg (136 lb 14.5 oz)   05/18/24 61.1 kg (134 lb 12.8 oz)   05/09/24 62.1 kg (137 lb)     Euvolemic            Hyperlipidemia  Assessment & Plan  Continue statin    Benign essential hypertension  Assessment & Plan  Continue losartan      History of DVT (deep vein thrombosis)  Assessment & Plan  Continue cutaneous heparin and SCDs    Descending aortic aneurysm (HCC)  Assessment & Plan  Continue outpatient follow-up        Medical Problems       Resolved Problems  Date Reviewed: 5/23/2024   None       Discharging Physician / Practitioner: Liliana Mojica MD  PCP: Kirby Casanova MD  Admission Date:   Admission Orders (From admission, onward)       Ordered        05/19/24 1243  Inpatient Admission  Once                          Discharge Date: 05/23/24    Consultations During Hospital Stay:  none    Procedures Performed:   none    Significant Findings / Test Results:   Chest x-ray 5/18/2024  IMPRESSION:  Mild pulmonary venous congestion.    CT head without contrast 5/19/2024  IMPRESSION:  No acute intracranial abnormality.  Chronic microangiopathic changes.  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    CT chest abdomen pelvis with contrast 5/19/2024  IMPRESSION:  1.  Bilateral nonobstructing renal calculi.  2.  Thoracic aortic  "aneurysm, unchanged since CTs dating back to 2020.3.  Emphysema.  4.  Scattered subsegmental atelectasis in both lungs.   5.  Cholelithiasis without evidence of acute cholecystitis.  6.  Colonic diverticulosis without evidence of acute diverticulitis.     Incidental Findings:   none       Test Results Pending at Discharge (will require follow up):   none     Outpatient Tests Requested:  none    Complications:  none    Reason for Admission: Acute metabolic encephalopathy due to hypercapnia    Hospital Course:   Severiano Feliciano is a 88 y.o. male patient who originally presented to the hospital on 5/19/2024 due to altered mental status.  Found to have acute on chronic hypoxic and hypercapnic respiratory failure.  Patient is noncompliant with his BiPAP machine at home.  Initially thought to be in acute COPD exacerbation, but he is not, steroids were stopped after first day and he was continued on nebulizer treatments.  His mentation is back to baseline per family.  Order was placed for nasal pillow fitting, he might tolerate that better than the facemask.  Discussed with family if patient continues to refuse BiPAP and he continues to come back to the hospital with worsening hypercapnic respiratory failure, they should consider hospice.  Seen by PT OT, recommended outpatient rehab      Please see above list of diagnoses and related plan for additional information.     Condition at Discharge: good    Discharge Day Visit / Exam:   Subjective: Patient was seen and evaluated bedside.  He is awake alert and oriented per family.  Feeling well, he would like to go home  Vitals: Blood Pressure: 149/79 (05/23/24 0756)  Pulse: 72 (05/23/24 0756)  Temperature: 98.9 °F (37.2 °C) (05/23/24 0756)  Temp Source: Oral (05/21/24 2129)  Respirations: 18 (05/23/24 0756)  Height: 5' 3\" (160 cm) (05/19/24 0747)  Weight - Scale: 62.1 kg (136 lb 14.5 oz) (05/23/24 0600)  SpO2: 96 % (05/23/24 0756)  Exam:   Physical Exam  Constitutional:     "   General: He is not in acute distress.  HENT:      Head: Atraumatic.   Cardiovascular:      Rate and Rhythm: Normal rate and regular rhythm.      Heart sounds: No murmur heard.  Pulmonary:      Effort: Pulmonary effort is normal. No respiratory distress.      Breath sounds: No wheezing.      Comments: Decreased breath sounds bilaterally  Abdominal:      General: Bowel sounds are normal. There is no distension.      Palpations: Abdomen is soft.      Tenderness: There is no abdominal tenderness.   Musculoskeletal:         General: No swelling.      Cervical back: Neck supple.   Skin:     General: Skin is warm and dry.   Neurological:      General: No focal deficit present.      Mental Status: He is alert.   Psychiatric:         Mood and Affect: Mood normal.          Discussion with Family: Updated wife at bedside and son Daljit over the phone    Discharge instructions/Information to patient and family:   See after visit summary for information provided to patient and family.      Provisions for Follow-Up Care:  See after visit summary for information related to follow-up care and any pertinent home health orders.      Mobility at time of Discharge:   Basic Mobility Inpatient Raw Score: 13  -HLM Goal: 4: Move to chair/commode  JH-HLM Achieved: 3: Sit at edge of bed  HLM Goal achieved. Continue to encourage appropriate mobility.     Disposition:   Home with VNA Services (Reminder: Complete face to face encounter)    Planned Readmission: no     Discharge Statement:  I spent 40 minutes discharging the patient. This time was spent on the day of discharge. I had direct contact with the patient on the day of discharge. Greater than 50% of the total time was spent examining patient, answering all patient questions, arranging and discussing plan of care with patient as well as directly providing post-discharge instructions.  Additional time then spent on discharge activities.    Discharge Medications:  See after visit  summary for reconciled discharge medications provided to patient and/or family.      **Please Note: This note may have been constructed using a voice recognition system**

## 2024-05-23 NOTE — ASSESSMENT & PLAN NOTE
Suspect secondary to acute on chronic hypoxic and hypercapnic respiratory failure  Labs stable, hypercapnic on VBG but pH normal  CT head showed no acute intracranial abnormality  Encourage BiPAP at bedtime and prn with naps  Geriatrics on board, appreciate recommendations  Mentation back to baseline per family

## 2024-05-23 NOTE — UTILIZATION REVIEW
NOTIFICATION OF ADMISSION DISCHARGE   This is a Notification of Discharge from [facility]. Please be advised that this patient has been discharge from our facility. Below you will find the admission and discharge date and time including the patient’s disposition.   UTILIZATION REVIEW CONTACT:  Tamika Dominguez  Utilization   Network Utilization Review Department  Phone: [phone] x carefully listen to the prompts. All voicemails are confidential.  Email: [email or web address]     ADMISSION INFORMATION  PRESENTATION DATE: 5/19/2024  7:39 AM  OBERVATION ADMISSION DATE:   INPATIENT ADMISSION DATE: 5/19/24 12:43 PM   DISCHARGE DATE: 5/23/2024  2:16 PM   DISPOSITION:Home with Home Health Care    Network Utilization Review Department  ATTENTION: Please call with any questions or concerns to [phone] and carefully listen to the prompts so that you are directed to the right person. All voicemails are confidential.   For Discharge needs, contact Care Management DC Support Team at [phone] opt. [phone]  Send all requests for admission clinical reviews, approved or denied determinations and any other requests to dedicated fax number below belonging to the campus where the patient is receiving treatment. List of dedicated fax numbers for the Facilities:  FACILITY NAME UR FAX NUMBER   ADMISSION DENIALS (Administrative/Medical Necessity) [phone]   DISCHARGE SUPPORT TEAM ([facility]) [phone]   PARENT CHILD HEALTH (Maternity/NICU/Pediatrics) [phone]   [facility] [phone]   [facility] [phone]   [facility] [phone]   [facility] [phone]   [facility] [phone]   [facility] [phone]   [facility] [phone]   [facility]  [phone]   [facility] [phone]   [facility] [phone]   [facility] [phone]   [facility] [phone]

## 2024-05-23 NOTE — CASE MANAGEMENT
Case Management Discharge Planning Note    Patient name Severiano Feliciano  Location [facility] 2 M* MRN [id number]  : 1935 Date 2024       Current Admission Date: 2024  Current Admission Diagnosis:Chronic obstructive pulmonary disease, unspecified COPD type (HCC)   Patient Active Problem List    Diagnosis Date Noted Date Diagnosed    Abnormal CT of the abdomen 2024     Encephalopathy acute 2024     Respiratory failure with hypoxia and hypercapnia (HCC) 2024     Chronic diastolic (congestive) heart failure (HCC) 2024     Delirium 2024     Chronic bilateral low back pain without sciatica 2024     Pulmonary hypertension (HCC) 2024     Aneurysm, carotid artery, internal 2024     Unspecified severe protein-calorie malnutrition (HCC) 01/15/2024     Macrocytosis 2023     Constipation 2023     Prediabetes 10/25/2023     Bilateral hearing loss 10/25/2023     PAC (premature atrial contraction) 2023     Vitamin B12 deficiency 2023     Vitamin D deficiency 2023     Weight loss 2022     Rectal bleeding 2021     Hyperlipidemia 2021     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     Metabolic encephalopathy 2020     Dysphagia 2020     TIA (transient ischemic attack) 2020     S/P hernia repair 2019     Acquired renal cyst of left kidney 2019     Acute on chronic diastolic CHF (congestive heart failure) (HCC) 2019     Thrombocytopenia (HCC) 2018     Varicose veins of both lower extremities with pain 2018     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HCC) 2018     Chronic respiratory failure with hypoxia and hypercapnia (HCC) 2018     Descending aortic aneurysm (HCC) 2018     History of DVT (deep vein thrombosis) 2018     Chronic obstructive pulmonary disease, unspecified COPD type (HCC) 2018     Benign  essential hypertension 08/02/2017       LOS (days): 4  Geometric Mean LOS (GMLOS) (days):   Days to GMLOS:     OBJECTIVE:  Risk of Unplanned Readmission Score: 32.87         Current admission status: Inpatient   Preferred Pharmacy:   [facility] - [place] - [address]  [address]  [place] [address]  Phone: [phone] Fax: [phone]    [facility] - [place] - [address]  [address]  [address]  [place] [address]  Phone: [phone] Fax: [phone]    Primary Care Provider: Kirby Casanova MD    Primary Insurance: [employer] [facility]  [facility]    DISCHARGE DETAILS:    Discharge planning discussed with:: Patient's spouse, Viridiana  Freedom of Choice: Yes  Comments - Freedom of Choice: Patient's spouse agreeable to HHC () and return for outpatient cardiac/pulm rehab  CM contacted family/caregiver?: Yes (Patient's spouse at bedside)  Were Treatment Team discharge recommendations reviewed with patient/caregiver?: Yes  Did patient/caregiver verbalize understanding of patient care needs?: Yes  Were patient/caregiver advised of the risks associated with not following Treatment Team discharge recommendations?: Yes    Contacts  Patient Contacts: Julio Severiano Son  [phone]  Relationship to Patient:: Family  Contact Method: Phone  Phone Number: [phone]  Reason/Outcome: Continuity of Care, Emergency Contact, Discharge Planning    Requested Home Health Care         Is the patient interested in HHC at discharge?: Yes  Home Health Discipline requested:: Nursing  HHA External Referral Reason (only applicable if external HHA name selected): Patient has established relationship with provider  Home Health Follow-Up Provider:: PCP  Home Health Services Needed:: COPD Management  Homebound Criteria Met:: Requires the Assistance of Another Person for Safe Ambulation or to Leave the Home,  Uses an Assist Device (i.e. cane, walker, etc)  Supporting Clincal Findings:: Fatigues Easliy in Short Distances, Limited Endurance    DME Referral Provided  Referral made for DME?: No    Other Referral/Resources/Interventions Provided:  Interventions: [facility]  Referral Comments: Otpt Cardiac pulm Rehab  Programs:: COPD    Treatment Team Recommendation: Home with Home Health Care  Discharge Destination Plan:: Home with Home Health Care  Transport at Discharge : Family     Additional Comments: CM met with patient's spouse at bedside; patient's spouse agreeable to [facility] ([facility]) referrals at this time, CM made referral in [place]. CM provided contact information for palliative services for family. Patient reportedly to continue with outpatient cardiac pulm rehab. CM sent follow up message for PT/OT for patient recommendations at this time. CM followed up with patient's son- Daljit, agreeable to [facility] ([facility]) with adding pending PT/OT as needed.  CM to follow.    Megan Rudd,

## 2024-05-23 NOTE — OCCUPATIONAL THERAPY NOTE
Occupational Therapy Evaluation     Patient Name: Severiano Feliciano  Today's Date: 5/23/2024  Problem List  Principal Problem:    Chronic obstructive pulmonary disease, unspecified COPD type (HCC)  Active Problems:    Descending aortic aneurysm (HCC)    History of DVT (deep vein thrombosis)    Benign essential hypertension    Hyperlipidemia    Chronic respiratory failure with hypoxia and hypercapnia (HCC)    Encephalopathy acute    Chronic diastolic (congestive) heart failure (HCC)    Abnormal CT of the abdomen    Past Medical History  Past Medical History:   Diagnosis Date    Acute metabolic encephalopathy 06/13/2020    Age-related cataract of right eye 04/04/2023    patient is medically cleared and presents with low risk of complication with this upcoming R cataract surgery.    Anemia     Aneurysm, aorta, thoracic (HCC)     Appendicolith     Ascending aortic aneurysm (HCC)     3.7    Asthma     BPH (benign prostatic hyperplasia)     CAD (coronary artery disease)     noted on CT scan    CHF (congestive heart failure) (HCC)     COPD (chronic obstructive pulmonary disease) (HCC)     Descending thoracic aortic aneurysm (HCC)     Diabetes mellitus (HCC)     Diverticulosis     Former tobacco use     GERD (gastroesophageal reflux disease)     History of DVT (deep vein thrombosis)     Left leg    History of transfusion     Hypertension     Hypoxemia 04/30/2023    Inguinal hernia     right    Nephrolithiasis     Oxygen dependent     2LNC    Oxygen dependent     Pneumonia     Pre-diabetes     Prostate calculus     PVD (peripheral vascular disease) (HCC)     Recurrent right inguinal hernia w incarcertion 01/04/2023    Thoracic aortic aneurysm without rupture (HCC) 11/19/2018    Added automatically from request for surgery [id number]    Thrombocytopenia (HCC) 12/29/2018    Ulcer     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)     Varicose vein of leg     b/l     Past Surgical History  Past Surgical History:    Procedure Laterality Date    CARDIAC SURGERY      ESOPHAGOGASTRODUODENOSCOPY      HERNIA REPAIR Right 1/21/2019    Procedure: REPAIR HERNIA INGUINAL WITH MESH;  Surgeon: David Lowry MD;  Location:  MAIN OR;  Service: General    HERNIA REPAIR Right 7/22/2023    Procedure: REPAIR RECURRENT INCARERATED HERNIA INGUINAL OPEN, RIGHT ORCHIECTOMY;  Surgeon: Mason Bertrand MD;  Location: [facility];  Service: General    INGUINAL HERNIA REPAIR Bilateral     IR TEVAR  12/27/2018    OH EVASC RPR DTA COVERAGE ART ORIGIN 1ST ENDOPROSTH N/A 12/27/2018    Procedure: TEVAR - endovascular thoracic aortic aneurysm repair;  Surgeon: Gladis Ortega MD;  Location: [facility];  Service: Vascular    THORACIC AORTIC ANEURYSM REPAIR  12/27/2018    VARICOSE VEIN SURGERY Bilateral     vein stripping           05/23/24 0927   OT Last Visit   OT Visit Date 05/23/24   Note Type   Note type Evaluation   Pain Assessment   Pain Assessment Tool FLACC   Pain Rating: FLACC (Rest) - Face 0   Pain Rating: FLACC (Rest) - Legs 0   Pain Rating: FLACC (Rest) - Activity 0   Pain Rating: FLACC (Rest) - Cry 0   Pain Rating: FLACC (Rest) - Consolability 0   Score: FLACC (Rest) 0   Pain Rating: FLACC (Activity) - Face 0   Pain Rating: FLACC (Activity) - Legs 0   Pain Rating: FLACC (Activity) - Activity 0   Pain Rating: FLACC (Activity) - Cry 0   Pain Rating: FLACC (Activity) - Consolability 0   Score: FLACC (Activity) 0   Restrictions/Precautions   Weight Bearing Precautions Per Order No   Other Precautions Chair Alarm;Bed Alarm;O2;Multiple lines;Fall Risk  (Monty; 4 L O2 NC)   Home Living   Type of Home House   Home Layout Two level;1/2 bath on main level;Bed/bath upstairs;Stairs to enter with rails  (3-4 SHYANNE; 13 stairs to 2nd level.)   Bathroom Shower/Tub Walk-in shower   Bathroom Toilet Standard   Bathroom Equipment Grab bars in shower;Shower chair;Grab bars around toilet   Home Equipment Walker;Cane;Wheelchair-manual   Additional Comments Pt lives  with spouse in a 2 level home with 3-4 SHYANNE, 1/2 bath on 1st level, and 13 stairs to 2nd level bed/main bath.   Prior Function   Level of [place] Needs assistance with ADLs;Needs assistance with IADLS;Independent with functional mobility   Lives With Spouse   Receives Help From Family;Outpatient therapy  (Son is paid caregiver for 8 hours per day 5 days/week. Pt reports going to outpatient therapy 2x/week.)   IADLs Family/Friend/Other provides meals;Family/Friend/Other provides transportation;Family/Friend/Other provides medication management   Falls in the last 6 months 0   Vocational Retired   Comments PTA pt required A with ADLs, A with IADLs, Mod I with functional transfers/mobility with use of SPC as needed. Pt reports using 3-4 L O2 at baseline. Pt's son is paid caregiver for 8 hours per day, 5 days per week. (-) . Pt denies falls PTA.   Lifestyle   Autonomy PTA pt required A with ADLs, A with IADLs, Mod I with functional transfers/mobility with use of SPC as needed. Pt reports using 3-4 L O2 at baseline. Pt's son is paid caregiver for 8 hours per day, 5 days per week. (-) . Pt denies falls PTA.   Reciprocal Relationships Spouse and son   Service to Others Retired   General   Family/Caregiver Present Yes   ADL   Where Assessed Edge of bed   Eating Assistance 6  Modified independent   Grooming Assistance 5  Supervision/Setup   UB Bathing Assistance 5  Supervision/Setup   LB Bathing Assistance 5  Supervision/Setup   UB Dressing Assistance 5  Supervision/Setup   LB Dressing Assistance 5  Supervision/Setup   Toileting Assistance  5  Supervision/Setup   Bed Mobility   Supine to Sit 5  Supervision   Additional items HOB elevated;Bedrails;Increased time required   Sit to Supine Unable to assess   Additional Comments Verbal cues for safe technique and hand placement. SpO2 94% resting on 4L O2 NC   Transfers   Sit to Stand 5  Supervision   Additional items Bedrails   Stand to Sit 5  Supervision    Additional items Bedrails;Increased time required   Functional Mobility   Functional Mobility 5  Supervision   Additional Comments Close supervision w/o use of AD. SpO2 79-81% with activity on 4 L O2 NC. Pt instructed on pursed lip breathing, SpO2 increased to 87-91% seated EOB on 4L O2 NC.   Balance   Static Sitting Good   Dynamic Sitting Fair +   Static Standing Fair   Dynamic Standing Fair -   Ambulatory Fair -   Activity Tolerance   Activity Tolerance Patient tolerated treatment well;Treatment limited secondary to medical complications (Comment)   Medical Staff Made Aware David PT; Jose Rafael RN   Nurse Made Aware yes   RUE Assessment   RUE Assessment WFL  (4-/5 throughout)   LUE Assessment   LUE Assessment WFL  (4-/5 throughout)   Hand Function   Gross Motor Coordination Functional   Fine Motor Coordination Functional   Sensation   Light Touch No apparent deficits   Proprioception   Proprioception No apparent deficits   Vision-Basic Assessment   Current Vision Wears glasses all the time   Visual History Other (Comment)  (Per chart, R eye impairment due to history of an accident.)   Vision - Complex Assessment   Ocular Range of Motion Intact   Acuity Able to read clock/calendar on wall without difficulty  (with glasses)   Psychosocial   Psychosocial (WDL) WDL   Perception   Inattention/Neglect Appears intact   Cognition   Overall Cognitive Status Impaired   Arousal/Participation Alert;Responsive;Arousable;Cooperative   Attention Attends with cues to redirect   Orientation Level Oriented to person;Oriented to place;Disoriented to time   Memory Within functional limits   Following Commands Follows one step commands with increased time or repetition   Comments Per geriatric medicine note, pt with history of diagnosed cognitive impairement. Pt may benefit from further cognitive assessment. Algerian speaking, wife present to assist with translation.   Assessment   Limitation Decreased ADL status;Decreased UE  strength;Decreased cognition;Decreased endurance;Decreased self-care trans;Decreased high-level ADLs   Prognosis Fair   Assessment Pt 88 y.o. male [facility] with c/o altered mental status and SOB. CT head (-) for acute intracranial abnormality. CT chest, abdomen, pelvis (+) for emphysema, scattered subsegmental atelectasis. Pt with recent admission to [facility] 4/25-4/27 for COPD exacerbation. Pt admitted with Chronic obstructive pulmonary disease. Pt with PMH COPD, chronic respiratory failure with hypoxia and hypercapnia, encephalopathy, CHF, Hyperlipidemia, HTN. Pt with active OT orders to assess functional status and D/C planning. Pt with activity orders for out of bed to chair. Pt lives with spouse in a 2 level home with 3-4 SHYANNE, 1/2 bath on 1st level, and 13 stairs to 2nd level bed/main bath. PTA pt required A with ADLs, A with IADLs, Mod I with functional transfers/mobility with use of SPC as needed. Pt reports using 3-4 L O2 at baseline. Pt's son is paid caregiver for 8 hours per day, 5 days per week. (-) . Pt denies falls PTA. Upon evaluation pt currently functioning at Supervision for UB ADLs, Supervision for LB ADLs, Supervision for toileting, Supervision for bed mobility, Supervision for functional transfers, and close Supervision for functional mobility w/o use of AD. Pt with the following performance deficits; decreased strength , decreased balance, decreased activity tolerance, impaired memory, and decreased cardiovascular endurance.  Pt also with the following personal factors; SHYANNE home environment, steps within home environment, difficulty performing transfers/mobility, fall risk , functional decline , increase in O2 requirements , and multiple admissions . Based on pt current functional status, pt would benefit from Level III (minimum resource intensity) at D/C. The patient's raw score on the AM-PAC Daily Activity Inpatient Short Form is 19. A raw score of greater than or  equal to 19 suggests the patient may benefit from discharge to home. Please refer to the recommendation of the Occupational Therapist for safe discharge planning.  Pt will continue to be seen 1-2x/week for skilled OT services while admitted to improve current functional status and performance with ADLs, transfers/mobility, endurance, activity tolerance, UE strength, cognition.   Goals   Patient Goals to get better   LTG Time Frame 10-14   Long Term Goal #1 Please see LTGs listed below   Plan   Treatment Interventions ADL retraining;Functional transfer training;UE strengthening/ROM;Endurance training;Cognitive reorientation;Patient/family training;Equipment evaluation/education;Continued evaluation;Activityengagement;Energy conservation   Goal Expiration Date 06/06/24   OT Treatment Day 0   OT Frequency 1-2x/wk   Discharge Recommendation   Rehab Resource Intensity Level, OT III (Minimum Resource Intensity)  (Outpatient PT)   AM-PAC Daily Activity Inpatient   Lower Body Dressing 3   Bathing 3   Toileting 3   Upper Body Dressing 3   Grooming 3   Eating 4   Daily Activity Raw Score 19   Daily Activity Standardized Score (Calc for Raw Score >=11) 40.22   AM-PAC Applied Cognition Inpatient   Following a Speech/Presentation 4   Understanding Ordinary Conversation 4   Taking Medications 3   Remembering Where Things Are Placed or Put Away 3   Remembering List of 4-5 Errands 2   Taking Care of Complicated Tasks 2   Applied Cognition Raw Score 18   Applied Cognition Standardized Score 38.07   End of Consult   Patient Position at End of Consult Seated edge of bed;Bed/Chair alarm activated;All needs within reach       GOALS:  Pt will improve activity tolerance to good for 30 min txment sessions to increase engagement in functional tasks  Pt will complete LB ADLs with Mod I with proper technique and use of LH AE with DME as needed for balance/safety.  Pt will complete UB ADLs with Mod I with proper technique and use of with DME  as needed for balance/safety.  Pt will complete toileting Mod I with good hygiene with DME as needed for balance/safety  Pt will complete bed mobility Mod I with Good balance and safety to decrease required assistance.  Pt will complete functional mobility Mod I on/off all surfaces with use of DME as needed for balance/safety.   Pt will complete functional transfers Mod I on/off all surfaces with use of DME as needed for balance/safety  Pt will correctly verbalize/identify 3 fall hazards and compensatory strategies to decrease fall risk in home environment.   Pt will increase dynamic standing endurance to complete functional task for 5-8 minutes with Fair+ standing balance with use of DME.  Pt will demonstrate 75% carryover of education and safe technique w/o cues for participation in functional tasks.  Pt will improve UE strength by 1MM to improve participation in functional mobility/transfers.   Pt will be attentive 75% of the time during ongoing cognitive assessment w/ G participation to assist w/ safe d/c planning/recommendations.  Pt will demonstrate 100% carryover of energy conservation techniques t/o functional tasks w/o cues to increase functional endurance for ADL tasks.     Kaila Reddy, OTS

## 2024-05-23 NOTE — PLAN OF CARE
Problem: Potential for Falls  Goal: Patient will remain free of falls  Description: INTERVENTIONS:  - Educate patient/family on patient safety including physical limitations  - Instruct patient to call for assistance with activity   - Consult OT/PT to assist with strengthening/mobility   - Keep Call bell within reach  - Keep bed low and locked with side rails adjusted as appropriate  - Keep care items and personal belongings within reach  - Initiate and maintain comfort rounds  - Make Fall Risk Sign visible to staff  - Offer Toileting every  Hours, in advance of need  - Initiate/Maintain alarm  - Obtain necessary fall risk management equipment:   - Apply yellow socks and bracelet for high fall risk patients  - Consider moving patient to room near nurses station  Outcome: Progressing     Problem: PAIN - ADULT  Goal: Verbalizes/displays adequate comfort level or baseline comfort level  Description: Interventions:  - Encourage patient to monitor pain and request assistance  - Assess pain using appropriate pain scale  - Administer analgesics based on type and severity of pain and evaluate response  - Implement non-pharmacological measures as appropriate and evaluate response  - Consider cultural and social influences on pain and pain management  - Notify physician/advanced practitioner if interventions unsuccessful or patient reports new pain  Outcome: Progressing     Problem: INFECTION - ADULT  Goal: Absence or prevention of progression during hospitalization  Description: INTERVENTIONS:  - Assess and monitor for signs and symptoms of infection  - Monitor lab/diagnostic results  - Monitor all insertion sites, i.e. indwelling lines, tubes, and drains  - Monitor endotracheal if appropriate and nasal secretions for changes in amount and color  - Stewart appropriate cooling/warming therapies per order  - Administer medications as ordered  - Instruct and encourage patient and family to use good hand hygiene technique  -  Identify and instruct in appropriate isolation precautions for identified infection/condition  Outcome: Progressing  Goal: Absence of fever/infection during neutropenic period  Description: INTERVENTIONS:  - Monitor WBC    Outcome: Progressing     Problem: SAFETY ADULT  Goal: Patient will remain free of falls  Description: INTERVENTIONS:  - Educate patient/family on patient safety including physical limitations  - Instruct patient to call for assistance with activity   - Consult OT/PT to assist with strengthening/mobility   - Keep Call bell within reach  - Keep bed low and locked with side rails adjusted as appropriate  - Keep care items and personal belongings within reach  - Initiate and maintain comfort rounds  - Make Fall Risk Sign visible to staff  - Offer Toileting every  Hours, in advance of need  - Initiate/Maintain alarm  - Obtain necessary fall risk management equipment:   - Apply yellow socks and bracelet for high fall risk patients  - Consider moving patient to room near nurses station  Outcome: Progressing  Goal: Maintain or return to baseline ADL function  Description: INTERVENTIONS:  -  Assess patient's ability to carry out ADLs; assess patient's baseline for ADL function and identify physical deficits which impact ability to perform ADLs (bathing, care of mouth/teeth, toileting, grooming, dressing, etc.)  - Assess/evaluate cause of self-care deficits   - Assess range of motion  - Assess patient's mobility; develop plan if impaired  - Assess patient's need for assistive devices and provide as appropriate  - Encourage maximum independence but intervene and supervise when necessary  - Involve family in performance of ADLs  - Assess for home care needs following discharge   - Consider OT consult to assist with ADL evaluation and planning for discharge  - Provide patient education as appropriate  Outcome: Progressing  Goal: Maintains/Returns to pre admission functional level  Description:  INTERVENTIONS:  - Perform AM-PAC 6 Click Basic Mobility/ Daily Activity assessment daily.  - Set and communicate daily mobility goal to care team and patient/family/caregiver.   - Collaborate with rehabilitation services on mobility goals if consulted  - Perform Range of Motion  times a day.  - Reposition patient every  hours.  - Dangle patient  times a day  - Stand patient  times a day  - Ambulate patient  times a day  - Out of bed to chair  times a day   - Out of bed for meals  times a day  - Out of bed for toileting  - Record patient progress and toleration of activity level   Outcome: Progressing     Problem: DISCHARGE PLANNING  Goal: Discharge to home or other facility with appropriate resources  Description: INTERVENTIONS:  - Identify barriers to discharge w/patient and caregiver  - Arrange for needed discharge resources and transportation as appropriate  - Identify discharge learning needs (meds, wound care, etc.)  - Arrange for interpretive services to assist at discharge as needed  - Refer to Case Management Department for coordinating discharge planning if the patient needs post-hospital services based on physician/advanced practitioner order or complex needs related to functional status, cognitive ability, or social support system  Outcome: Progressing     Problem: Knowledge Deficit  Goal: Patient/family/caregiver demonstrates understanding of disease process, treatment plan, medications, and discharge instructions  Description: Complete learning assessment and assess knowledge base.  Interventions:  - Provide teaching at level of understanding  - Provide teaching via preferred learning methods  Outcome: Progressing     Problem: Prexisting or High Potential for Compromised Skin Integrity  Goal: Skin integrity is maintained or improved  Description: INTERVENTIONS:  - Identify patients at risk for skin breakdown  - Assess and monitor skin integrity  - Assess and monitor nutrition and hydration status  -  Monitor labs   - Assess for incontinence   - Turn and reposition patient  - Assist with mobility/ambulation  - Relieve pressure over bony prominences  - Avoid friction and shearing  - Provide appropriate hygiene as needed including keeping skin clean and dry  - Evaluate need for skin moisturizer/barrier cream  - Collaborate with interdisciplinary team   - Patient/family teaching  - Consider wound care consult   Outcome: Progressing     Problem: CARDIOVASCULAR - ADULT  Goal: Maintains optimal cardiac output and hemodynamic stability  Description: INTERVENTIONS:  - Monitor I/O, vital signs and rhythm  - Monitor for S/S and trends of decreased cardiac output  - Administer and titrate ordered vasoactive medications to optimize hemodynamic stability  - Assess quality of pulses, skin color and temperature  - Assess for signs of decreased coronary artery perfusion  - Instruct patient to report change in severity of symptoms  Outcome: Progressing     Problem: RESPIRATORY - ADULT  Goal: Achieves optimal ventilation and oxygenation  Description: INTERVENTIONS:  - Assess for changes in respiratory status  - Assess for changes in mentation and behavior  - Position to facilitate oxygenation and minimize respiratory effort  - Oxygen administered by appropriate delivery if ordered  - Initiate smoking cessation education as indicated  - Encourage broncho-pulmonary hygiene including cough, deep breathe, Incentive Spirometry  - Assess the need for suctioning and aspirate as needed  - Assess and instruct to report SOB or any respiratory difficulty  - Respiratory Therapy support as indicated  Outcome: Progressing

## 2024-05-23 NOTE — ASSESSMENT & PLAN NOTE
Patient is an 88-year-old male with a past medical history of severe COPD, chronic respiratory failure with hypoxia and hypercapnia, chronic diastolic heart failure, hyperlipidemia, history of DVT, descending aortic aneurysm, hypertension who presented to the hospital with shortness of breath.  Patient had multiple admissions in the past for COPD exacerbation.  Presented to the ED with altered mental status  Found to have acute on chronic hypoxic and hypercapnic respiratory failure, admitted to ICU on BiPAP  CT chest abdomen pelvis.  Emphysema, scattered subsegmental atelectasis  For possible mild exacerbation started on Solu-Medrol and bronchodilators with improvement  In the ICU steroids were stopped, he is not in exacerbation, continue the nebulizer treatments  He is currently on 2 L of oxygen, saturating in the mid 90s  Continue Pulmicort, Perforomist and DuoNeb, home regimen  Goal oxygen saturation 89 to 92%, titrate oxygen down  Patient's primary pulmonologist recommended palliative care evaluation for severe COPD, patient and family not ready  Order for mask fitting was placed.  Per family he is not tolerating his home BiPAP mask.  Might benefit from nasal pillow

## 2024-05-23 NOTE — PLAN OF CARE
Problem: OCCUPATIONAL THERAPY ADULT  Goal: Performs self-care activities at highest level of function for planned discharge setting.  See evaluation for individualized goals.  Description: Treatment Interventions: ADL retraining, Functional transfer training, UE strengthening/ROM, Endurance training, Cognitive reorientation, Patient/family training, Equipment evaluation/education, Continued evaluation, Activityengagement, Energy conservation          See flowsheet documentation for full assessment, interventions and recommendations.   Note: Limitation: Decreased ADL status, Decreased UE strength, Decreased cognition, Decreased endurance, Decreased self-care trans, Decreased high-level ADLs  Prognosis: Fair  Assessment: Pt 88 y.o. male presenting to [facility] with c/o altered mental status and SOB. CT head (-) for acute intracranial abnormality. CT chest, abdomen, pelvis (+) for emphysema, scattered subsegmental atelectasis. Pt with recent admission to [facility] 4/25-4/27 for COPD exacerbation. Pt admitted with Chronic obstructive pulmonary disease. Pt with PMH COPD, chronic respiratory failure with hypoxia and hypercapnia, encephalopathy, CHF, Hyperlipidemia, HTN. Pt with active OT orders to assess functional status and D/C planning. Pt with activity orders for out of bed to chair. Pt lives with spouse in a 2 level home with 3-4 SHYANNE, 1/2 bath on 1st level, and 13 stairs to 2nd level bed/main bath. PTA pt required A with ADLs, A with IADLs, Mod I with functional transfers/mobility with use of SPC as needed. Pt reports using 3-4 L O2 at baseline. Pt's son is paid caregiver for 8 hours per day, 5 days per week. (-) . Pt denies falls PTA. Upon evaluation pt currently functioning at Supervision for UB ADLs, Supervision for LB ADLs, Supervision for toileting, Supervision for bed mobility, Supervision for functional transfers, and close Supervision for functional mobility w/o use of AD. Pt with the following  performance deficits; decreased strength , decreased balance, decreased activity tolerance, impaired memory, and decreased cardiovascular endurance.  Pt also with the following personal factors; SHYANNE home environment, steps within home environment, difficulty performing transfers/mobility, fall risk , functional decline , increase in O2 requirements , and multiple admissions . Based on pt current functional status, pt would benefit from Level III (minimum resource intensity) at D/C. The patient's raw score on the -PAC Daily Activity Inpatient Short Form is 19. A raw score of greater than or equal to 19 suggests the patient may benefit from discharge to home. Please refer to the recommendation of the Occupational Therapist for safe discharge planning.  Pt will continue to be seen 1-2x/week for skilled OT services while admitted to improve current functional status and performance with ADLs, transfers/mobility, endurance, activity tolerance, UE strength, cognition.     Rehab Resource Intensity Level, OT: III (Minimum Resource Intensity) (Outpatient PT)

## 2024-05-23 NOTE — ASSESSMENT & PLAN NOTE
Chronic hypoxic and hypercapnic respiratory failure secondary to very severe COPD with multiple admissions  Currently on 4 L of oxygen saturating in the mid 90s, wean down to oxygen saturation 89 to 92%  Steroids were stopped in the ICU  Continue nebulizer treatments

## 2024-05-23 NOTE — DISCHARGE INSTR - AVS FIRST PAGE
You presented to the hospital with altered mental status, found to have elevated CO2 in the system most likely from not using your BiPAP machine.  Please be compliant with your BiPAP machine to avoid increasing CO2 level in your blood.  Continue your nebulizer treatments as prior to coming to the hospital.  Follow-up with pulmonology.

## 2024-05-23 NOTE — CASE MANAGEMENT
Case Management Discharge Planning Note    Patient name Severiano Feliciano  Location [facility] 2 [facility] 2 M* MRN [id number]  : 1935 Date 2024       Current Admission Date: 2024  Current Admission Diagnosis:Chronic obstructive pulmonary disease, unspecified COPD type (HCC)   Patient Active Problem List    Diagnosis Date Noted Date Diagnosed    Abnormal CT of the abdomen 2024     Encephalopathy acute 2024     Respiratory failure with hypoxia and hypercapnia (HCC) 2024     Chronic diastolic (congestive) heart failure (HCC) 2024     Delirium 2024     Chronic bilateral low back pain without sciatica 2024     Pulmonary hypertension (HCC) 2024     Aneurysm, carotid artery, internal 2024     Unspecified severe protein-calorie malnutrition (HCC) 01/15/2024     Macrocytosis 2023     Constipation 2023     Prediabetes 10/25/2023     Bilateral hearing loss 10/25/2023     PAC (premature atrial contraction) 2023     Vitamin B12 deficiency 2023     Vitamin D deficiency 2023     Weight loss 2022     Rectal bleeding 2021     Hyperlipidemia 2021     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     Metabolic encephalopathy 2020     Dysphagia 2020     TIA (transient ischemic attack) 2020     S/P hernia repair 2019     Acquired renal cyst of left kidney 2019     Acute on chronic diastolic CHF (congestive heart failure) (HCC) 2019     Thrombocytopenia (HCC) 2018     Varicose veins of both lower extremities with pain 2018     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HCC) 2018     Chronic respiratory failure with hypoxia and hypercapnia (HCC) 2018     Descending aortic aneurysm (HCC) 2018     History of DVT (deep vein thrombosis) 2018     Chronic obstructive pulmonary disease, unspecified COPD type (HCC) 2018     Benign  essential hypertension 08/02/2017       LOS (days): 4  Geometric Mean LOS (GMLOS) (days):   Days to GMLOS:     OBJECTIVE:  Risk of Unplanned Readmission Score: 32.94         Current admission status: Inpatient   Preferred Pharmacy:   [facility] - [place] - [address]  [address]  [place] [address]  Phone: [phone] Fax: [phone]    [facility] - [place] - [address]  [address]  [address]  [place] [address]  Phone: [phone] Fax: [phone]    Primary Care Provider: Kirby Casanova MD    Primary Insurance: [employer] [facility]  Secondary Insurance: [facility]    DISCHARGE DETAILS:      Requested Home Health Care         Is the patient interested in HHC at discharge?: Yes  Home Health Discipline requested:: Nursing  Home Health Agency Name:: [facility]  A External Referral Reason (only applicable if external HHA name selected): Services not provided in network or near patient location  Home Health Follow-Up Provider:: PCP  Home Health Services Needed:: COPD Management  Homebound Criteria Met:: Requires the Assistance of Another Person for Safe Ambulation or to Leave the Home, Uses an Assist Device (i.e. cane, walker, etc)  Supporting Clincal Findings:: Limited Endurance, Fatigues Easliy in Short Distances      Other Referral/Resources/Interventions Provided:  Interventions: HHC, DME  Referral Comments: [facility]; [facility] Cardiac PULM Re[facility]     Additional Comments: GANGA spoke with patient's spouse and son- Daljit (phone) at bedside, patient's family declined HHC (SN). GANGA discussed adding nurse visit for post hospitalization, patient agreeable to HHC (SN) with [facility]. CM reserved in Aidin and updated discharge plan. CM sent follow up message with pulmonology to request information for mask fitting to request in [place]. CM to follow.    GANGA spoke with Freddy ([facility]) for a mask fitting. [facility]. Freddy made order for [facility] respiratory team to contact patient for mask fitting. CM to follow.      Megan Rudd,

## 2024-05-23 NOTE — PROGRESS NOTES
Patient:  SEVERIANO FELICIANO    MRN:  [id number]    Aidin Request ID:  [id number]    Level of care reserved:  Hospice    Partner Reserved:  [facility] [place] [address] [phone]    Clinical needs requested:    Geography searched:  [id number]    Start of Service:    Request sent:  2:14pm EDT on 5/23/2024 by Megan Rudd    Partner reserved:  4:38pm EDT on 5/23/2024 by Malia Bautista    Choice list shared:  4:38pm EDT on 5/23/2024 by Malia Bautista

## 2024-05-24 ENCOUNTER — TRANSITIONAL CARE MANAGEMENT (OUTPATIENT)
Dept: FAMILY MEDICINE CLINIC | Facility: CLINIC | Age: 89
End: 2024-05-24

## 2024-05-24 LAB
BACTERIA BLD CULT: NORMAL
BACTERIA BLD CULT: NORMAL

## 2024-05-28 ENCOUNTER — TELEPHONE (OUTPATIENT)
Dept: OTHER | Facility: OTHER | Age: 89
End: 2024-05-28

## 2024-05-28 ENCOUNTER — TELEPHONE (OUTPATIENT)
Age: 89
End: 2024-05-28

## 2024-05-28 ENCOUNTER — CLINICAL SUPPORT (OUTPATIENT)
Dept: PULMONOLOGY | Facility: CLINIC | Age: 89
End: 2024-05-28
Payer: MEDICARE

## 2024-05-28 DIAGNOSIS — J44.9 CHRONIC OBSTRUCTIVE PULMONARY DISEASE, UNSPECIFIED COPD TYPE (HCC): Primary | ICD-10-CM

## 2024-05-28 PROCEDURE — G0239 OTH RESP PROC, GROUP: HCPCS

## 2024-05-28 NOTE — TELEPHONE ENCOUNTER
Son called to see if the letter was sent to [employer]. I noted the note made by Noa PAYAN from the office that she sent the letter in today. I made the son aware and he was very pleased.

## 2024-05-28 NOTE — TELEPHONE ENCOUNTER
"Pt's son stated, \"My father still needs his letter for [employer]. He dose not have oxygen only the portable one. He is at my brother's and wants to come back home. Please have the office call me to confirm they have sent this.\"    Please call pt's son when office reopen.s  Pt's son declined to speak to after hours nurse.  "

## 2024-05-28 NOTE — TELEPHONE ENCOUNTER
Pt's wife called and requested a letter for [employer]. She stated electricity was turned off and pt needs oxygen 24/7 and machine is currently at 2%. [employer] account#[id number]. Please advise if letter can be done. Any questions she can be reached back at [phone].

## 2024-05-29 ENCOUNTER — OFFICE VISIT (OUTPATIENT)
Dept: FAMILY MEDICINE CLINIC | Facility: CLINIC | Age: 89
End: 2024-05-29
Payer: MEDICARE

## 2024-05-29 ENCOUNTER — TELEPHONE (OUTPATIENT)
Age: 89
End: 2024-05-29

## 2024-05-29 VITALS
TEMPERATURE: 98.1 F | DIASTOLIC BLOOD PRESSURE: 90 MMHG | SYSTOLIC BLOOD PRESSURE: 150 MMHG | BODY MASS INDEX: 21.26 KG/M2 | OXYGEN SATURATION: 93 % | HEART RATE: 78 BPM | RESPIRATION RATE: 22 BRPM | WEIGHT: 120 LBS | HEIGHT: 63 IN

## 2024-05-29 DIAGNOSIS — Z76.89 ENCOUNTER FOR SUPPORT AND COORDINATION OF TRANSITION OF CARE: Primary | ICD-10-CM

## 2024-05-29 DIAGNOSIS — J44.9 CHRONIC OBSTRUCTIVE PULMONARY DISEASE, UNSPECIFIED COPD TYPE (HCC): ICD-10-CM

## 2024-05-29 PROCEDURE — 99214 OFFICE O/P EST MOD 30 MIN: CPT | Performed by: FAMILY MEDICINE

## 2024-05-29 RX ORDER — AZITHROMYCIN 250 MG/1
TABLET, FILM COATED ORAL
Qty: 14 TABLET | Refills: 0 | Status: SHIPPED | OUTPATIENT
Start: 2024-05-29 | End: 2024-05-29

## 2024-05-29 RX ORDER — PREDNISONE 2.5 MG/1
2.5 TABLET ORAL DAILY
Qty: 30 TABLET | Refills: 5 | Status: SHIPPED | OUTPATIENT
Start: 2024-05-29

## 2024-05-29 NOTE — TELEPHONE ENCOUNTER
"Son called regarding letter for [employer]; advised that it was faxed yesterday. Son stated that [employer] told him \"it's not completed.\" I advised to find out what's not completed and he can discuss with Dr Casanova during dad's appointment today. He stated he will call [employer] and call back.  "

## 2024-05-29 NOTE — PROGRESS NOTES
"Name: Severiano Feliciano      : 1935      MRN: [id number]  Encounter Provider: Kirby Casanova MD  Encounter Date: 2024   Encounter department: [facility] PRIMARY CARE [facility]    Assessment and Plan   Coordination of Care After Hospital Admission for Recurrent SOB, End-Stage COPD, and Hypoxia    Date of Admission: 2024    Date of Discharge: 2024      Follow-Up for Respiratory Issues    Chief Complaint:    Follow-up for respiratory issues and medication management.    History of Present Illness:    The patient reports ongoing respiratory issues and difficulty breathing, particularly when considering travel. They mentioned the use of a respiratory machine, which is not effective for extended periods. The patient is currently on a low-dose prednisone regimen (2.5 mg) and an antibiotic course for two weeks. Concerns were raised about the ability to travel due to respiratory limitations and the need for continuous medication.    Medications:    - Prednisone 2.5 mg daily    - Azithromycin (ZITHROMAX) 250 mg tablet; Take 2 tablets today then 1 tablet daily x 4 days      Allergies:    No known drug allergies    Past Medical History:    - Chronic obstructive pulmonary disease (COPD)    Past Surgical History:    Not discussed    Social History:    Patient mentioned travel plans and concerns about respiratory health during travel.    Family History:    Not discussed    Review of Systems:    - Respiratory: Difficulty breathing, especially during travel considerations.    Vital Signs:    /90 (BP Location: Left arm, Patient Position: Sitting, Cuff Size: Standard)   Pulse 78   Temp 98.1 °F (36.7 °C) (Tympanic)   Resp 22   Ht 5' 3\" (1.6 m)   Wt 54.4 kg (120 lb)   SpO2 93% Comment: oxygen 3 liters  BMI 21.26 kg/m²       Physical Exam:    Poor air entrance and entrapment.      Assessment and Plan:    1. Encounter for support and coordination of transition of care:       - " Patient very happy with our care. transition from hospital to home care, focusing on respiratory support and medication adherence.    2. Chronic obstructive pulmonary disease (COPD), unspecified COPD type (HCC):       - Continue current prednisone regimen at 2.5 mg daily.       - Complete the prescribed azithromycin course as directed.       - Monitor respiratory status closely, especially if considering travel.    3. Travel considerations:       - Advised against travel due to respiratory limitations and the need for continuous use of respiratory support.       - Discussed the potential risks and the need for a portable, reliable respiratory machine if travel is necessary.    Additional Notes:    Patient discussed travel concerns and medication adherence.    Billing Information:    - ICD-10: J44.9 - Chronic obstructive pulmonary disease, unspecified      Subjective     TCM Call       Date and time call was made  5/24/2024  8:47 AM    Hospital care reviewed  Records reviewed    Patient was hospitialized at  [facility]    Date of Admission  05/19/24    Date of discharge  05/23/24    Diagnosis  Chronic obstructive pulmonary disease with acute exacerbation    Disposition  Home    Current Symptoms  None          TCM Call       Post hospital issues  None    Should patient be enrolled in anticoag monitoring?  No    Scheduled for follow up?  Yes    Patients specialists  [facility]    Did you obtain your prescribed medications  Yes    Do you need help managing your prescriptions or medications  No    Is transportation to your appointment needed  No    I have advised the patient to call PCP with any new or worsening symptoms  Oanh Roberts MA    Living Arrangements  Family members    Have you fallen in the last 12 months  No    Interperter language line needed  No            Kirby Casanova MD   [facility] PRIMARY CARE [facility]

## 2024-05-30 ENCOUNTER — APPOINTMENT (OUTPATIENT)
Dept: PULMONOLOGY | Facility: CLINIC | Age: 89
End: 2024-05-30
Payer: MEDICARE

## 2024-06-07 ENCOUNTER — TELEPHONE (OUTPATIENT)
Dept: CARDIAC REHAB | Facility: CLINIC | Age: 89
End: 2024-06-07

## 2024-06-13 ENCOUNTER — CLINICAL SUPPORT (OUTPATIENT)
Dept: PULMONOLOGY | Facility: CLINIC | Age: 89
End: 2024-06-13
Payer: MEDICARE

## 2024-06-13 DIAGNOSIS — J44.9 CHRONIC OBSTRUCTIVE PULMONARY DISEASE, UNSPECIFIED COPD TYPE (HCC): Primary | ICD-10-CM

## 2024-06-13 PROCEDURE — G0239 OTH RESP PROC, GROUP: HCPCS

## 2024-06-14 ENCOUNTER — TELEPHONE (OUTPATIENT)
Age: 89
End: 2024-06-14

## 2024-06-14 DIAGNOSIS — J96.11 CHRONIC RESPIRATORY FAILURE WITH HYPOXIA AND HYPERCAPNIA (HCC): ICD-10-CM

## 2024-06-14 DIAGNOSIS — J96.12 CHRONIC RESPIRATORY FAILURE WITH HYPOXIA AND HYPERCAPNIA (HCC): ICD-10-CM

## 2024-06-14 DIAGNOSIS — J44.9 CHRONIC OBSTRUCTIVE PULMONARY DISEASE, UNSPECIFIED COPD TYPE (HCC): Primary | ICD-10-CM

## 2024-06-14 NOTE — TELEPHONE ENCOUNTER
Pts wife called in regards to a machine that the pulmonary doctor ordered for the pt. Gave her the phone number to the doctor who ordered it.

## 2024-06-14 NOTE — TELEPHONE ENCOUNTER
Pt's spouse calling regarding pt's BiPAP. States current machine is broken and she spoke with DME and they said he will need a new machine. Viridiana is calling requesting a new BiPAP machine urgently as pt has not been able to use it since [facility]. She is asking for a callback once order is processed.    Callback: Pt's son Daljit: [phone].

## 2024-06-14 NOTE — TELEPHONE ENCOUNTER
Danika can you call the patient's DME company and get exact settings and then let me and Ashley know and an order for BiPAP can be placed

## 2024-06-18 ENCOUNTER — CLINICAL SUPPORT (OUTPATIENT)
Dept: PULMONOLOGY | Facility: CLINIC | Age: 89
End: 2024-06-18
Payer: MEDICARE

## 2024-06-18 DIAGNOSIS — J44.9 CHRONIC OBSTRUCTIVE PULMONARY DISEASE, UNSPECIFIED COPD TYPE (HCC): Primary | ICD-10-CM

## 2024-06-18 PROCEDURE — G0239 OTH RESP PROC, GROUP: HCPCS

## 2024-06-19 NOTE — PROGRESS NOTES
Patient calling in regards to angiogram which was done about two weeks ago  Patient saw Felicia on 10/26/23 and patient would like to ask a few questions about the angiogram     Please call back   [phone]   Pulmonary Rehabilitation Plan of Care   Reassessment      Today's date: 2024   # of Exercise Sessions Completed: 26  Patient name: Severiano Feliciano      : 1935  Age: 88 y.o.       MRN: [id number]  Referring Physician: Buddy Hayward MD  Pulmonologist: Buddy Hayward MD    Provider: [place]  Clinician: Clarisa Harris MS, CEP    Dx:    Encounter Diagnosis   Name Primary?    Chronic obstructive pulmonary disease, unspecified COPD type (HCC) Yes     Date of onset: 2024      SUMMARY OF PROGRESS:   Severiano returned to pulmonary rehab post hospitalization on 24, was out sick until returning again on . His exercise duration and intensity is severely reduced due to fatigue. He tolerates 28-35 min of exercise at 1.6-2.3 METs with fatigue. Resting HR 77-82 bpm with increased HR to 83-88 bpm. Variable resting /82 - 166/94 with improved recovery BP of 124/84 - 150/86. Resting SpO2 90-92% with maintained SpO2 during exercise on 3 LO2. Severiano reports minimal dyspnea at rest with increased dyspnea to 2-3/10 during exercise. He has not been able to exercise at home and has not been doing much of anything at all at home. His exercise duration and intensity in pulmonary rehab will be progressed as tolerated to get Severiano stronger again and moving more at home in his final 10 sessions. (approx 5 weeks).    24: Severiano has completed 3 sessions in the last 30 days. He returned to pulmonary rehab exercise on , after being hospitalized - for COPD exacerbation. However, Severiano went back into the hospital on  and is currently still admitted. He will return to exercise after discharge and physician clearance to complete 36 total sessions.Communication continues to be difficult with Severiano, not only with the language barrier but also due to severe hearing loss and he often does not wear his hearing aids.   Severiano tolerates 40-42 mins of exercise at 1.7-2.9  METs on 3LO2. O2 is occ increased to 4L at rest, because SpO2 ranges from 82-89% right after walking in to rehab. Normal resting with normal increase to exercise. Occ elevated resting BP Resting //90. Severiano has lost 6 lbs ,without intention, since his initial evaluation and his family is in charge of his meals at home. No concern of anxiety or depression at this time. Severiano will continue to be progressed upon his return to reach his goals.   Patient specific goals include: become more independent and move more at home.    Medication compliance: Yes   Comments: Pt reports to be compliant with medications    Fall Risk: High   Comments: Patient uses walking assist device (walker/cane/rollator) and Denies a fall in the past 6 months    Smoking: Former user  Quit 20 years ago    Pulmonary Disease Risk Factors:  Occupational exposure to workplace  fumes    RPD at Rest: 1-2/10  RPD with Exercise: 2-3/10    Assessment of progression of lung disease and functional status:  CAT: not assessed  Shortness of breath questionnaire: 54/120      EXERCISE ASSESSMENT and PLAN    Current Exercise Program in Rehab:       Frequency: 2 days/week   Supplement with home exercise 2+ days/wk as tolerated        Minutes: 28-35         METS: 1.6-2.3              SpO2: 91-93%              RPD: 2-3                      HR: 83-88   RPE: 4-5         Modalities: UBE, NuStep, Recumbent bike, and Room walking      Exercise Progression 30 Day Goals :    Frequency: 2 days/week    Supplement with home exercise 2+ days/wk as tolerated       Minutes: 40-45        METS: 2.5-3.2              SpO2: >88%              RPD: 4-6                      HR:    RPE: 4-5        Modalities: UBE, NuStep, Recumbent bike, and Room walking     Strength trainin-3 days / week  12-15 repetitions  1-2 sets per modality    Modalities: Sit to Stands, Calf Raises, and leg extensions  - has not resumed weight lifting since return to pulmonary  rehab    Home Exercise: none    Education: pursed lipped breathing, fall prevention while using nasal canula tubing, relaxation breathing, benefits of exercise for pulmonary disease, RPD scale, O2 saturation monitoring, and appropriate O2 response to exercise    SMART Goals:   improved 6MWT distance, reduced dyspnea during exercise, improved exercise tolerance based on peak METs tolerated in pulmonary rehab exercise session, and SpO2 >88% during exercise    Patient Specific EXERCISE GOALS:   (home exercise, ADLs): reduced dependence on supplemental O2, attend pulmonary rehab regularly, decrease sitting time at home, start a walking program, reduced pulmonary related hospital readmissions , increased muscular strength, increased energy, increased stamina with ADLs, and more independence at home    Patient's progress toward SMART and personal goals: No progression: reduced exercise duration and intensity - minimal home activity    Patient's goals for next 30 days: continue pulmonary rehab exercise 2x/week with increased duration and intensity as tolerated, resume weight training    Plan: Titrate supplemental oxygen as needed to maintain SpO2>88% with exercise, reduce time sitting at home, and utilize PLB with physical activity    Readiness to change: Action:  (Changing behavior)      NUTRITION ASSESSMENT AND PLAN    Weight control:    Starting weight: 137 lbs   Current weight: 133.4 lbs    Diabetes: N/A    SMART Goals:   eat 6 or more servings of grain products per day, eat 5 or more servings of fruits and vegetables a day, rarely eat processed meats or eat low fat processed meats, Do not eat fried foods, choose healthy snacks such as fruit, pretzels, and low fat crackers, choose healthy desserts and sweets such as patrice food cake or  fruit, choose low sugar desserts and sweets, and drink less than 8oz of soda and sweetened drinks per day      Patient Specific NUTRITION GOALS:  (wt control, diabetes management,  dietary modifications): improve hydration increased muscle mass    Patient's progress toward SMART and personal goals: 2 lbs weight gain in the past 30 days.    Patient's goals for next 30 days: reduce sweets and increase healthy fats for healthy weight gain and muscle mass, resume weight training in pulmonary rehab    Measurable goals were based Rate Your Plate Dietary Self-Assessment. These are the areas in which the patient could score higher on the assessment.  Goals include recommendations for a heart healthy diet based on American Heart Association.    Education: heart healthy eating principles    Plan: replace refined flours with whole grains, increase daily intake of fruits and vegetables, increase daily intake of low fat dairy, eat healthy snacks like fruit, pretzels, and low fat crackers, and choose desserts such as fruit, patrice food cake, low-fat or fat-free sweets    Readiness to change: Preparation:  (Getting ready to change)       PSYCHOSOCIAL ASSESSMENT AND PLAN    Emotional:  Depression assessment:  PHQ-9 = 9  5-9 = Mild Depression            Anxiety measure:  LILA-7 = 6  5-9 = Mild anxiety    Assessment of depression and anxiety    Patient's son denies anxiety and depression on patient's behalf    Self-reported stress level:  2  Stress Management: Practice Relaxation Techniques, Exercise, and Enjoy a hobby    Patient's rating of Social support: Very Good   Social Support Network: children, grandchildren, and spouse    Psychosocial Assessment as it relates to rehabilitation: Patient denies issues with his/her family or home life that may affect their rehabilitation efforts.       SMART Goals:    PHQ-9 - reduced severity by one level, Physical Fitness in [place] Score < 3, Daily Activity in [place] Score < 3, Pain in [place] Score < 3, Overall Health in [facility] [facility] Score < 3, Quality of Life in [place] Score < 3 , Change in Health in [facility] Score < 3 ,  Increased interest and pleasure in doing  things, and feel less tired with more energy    Patient Specific PSYCHOCOSOCIAL GOALS:  (stress, emotional well being, social support): spend time with family    Patient's progress toward SMART and personal goals: enjoys coming to rehab and is glad to be back    Patient's goals for next 30 days: increase home activity, increased independence at home as tolerated      Education: benefits of a positive support system, stress management techniques, and depression and pulmonary disease    Plan: Exercise, Reduce dependence  on family, Return to previous social activity, and Avoid triggers    Readiness to change: Action:  (Changing behavior)      OTHER CORE COMPONENTS     Tobacco:   Social History     Tobacco Use   Smoking Status Former    Current packs/day: 0.00    Average packs/day: 1 pack/day for 35.0 years (35.0 ttl pk-yrs)    Types: Cigarettes    Start date:     Quit date:     Years since quittin.4   Smokeless Tobacco Never       Tobacco Use Intervention: Referral to tobacco expert:   Pt quit 20 years ago   and has abstained    Blood pressure:    Restin/82 - 166/94   Recovery: 124/84 - 150/86    Oxygen needs:   Rest:  supplemental O2 via nasal cannula @ 3L/min   Exercise/physical activity:  supplemental O2 via nasal cannula @ 3-4L/min   Sleep:   supplemental O2 via nasal cannula @ 3L/min     Oxygen Goal: Maintain SpO2>88% during exercise    SMART Goals: reduced dietary sodium <2000mg and medication compliance    Patient Specific CORE COMPONENT GOALS (smoking, BP control, angina control, medication compliance): Take inhalers properly to ensure he is receiving the medication    Patient's progress toward SMART and personal goals: Variable resting BP with improved recovery BP    Patient's goals for next 30 days: hospital discharge and return to pul rehab; Continue to utilize supplemental O2 at 3L with exercise and increase to 4L to maintain SpO2 >88%; ensure 100% medication compliance.     Education:   pathophysiology of pulmonary disease, inspiratory muscle training, and education: Pulmonary Anatomy and Physiology    Plan: medication compliance, engage in regular exercise, and monitor home BP    Readiness to change: Action:  (Changing behavior)

## 2024-06-20 ENCOUNTER — CLINICAL SUPPORT (OUTPATIENT)
Dept: PULMONOLOGY | Facility: CLINIC | Age: 89
End: 2024-06-20
Payer: MEDICARE

## 2024-06-20 DIAGNOSIS — J44.9 CHRONIC OBSTRUCTIVE PULMONARY DISEASE, UNSPECIFIED COPD TYPE (HCC): Primary | ICD-10-CM

## 2024-06-20 PROCEDURE — G0239 OTH RESP PROC, GROUP: HCPCS

## 2024-06-21 DIAGNOSIS — J96.11 CHRONIC RESPIRATORY FAILURE WITH HYPOXIA AND HYPERCAPNIA (HCC): Primary | ICD-10-CM

## 2024-06-21 DIAGNOSIS — J96.12 CHRONIC RESPIRATORY FAILURE WITH HYPOXIA AND HYPERCAPNIA (HCC): Primary | ICD-10-CM

## 2024-06-21 RX ORDER — ALBUTEROL SULFATE 90 UG/1
2 AEROSOL, METERED RESPIRATORY (INHALATION) EVERY 6 HOURS PRN
Qty: 18 G | Refills: 5 | Status: SHIPPED | OUTPATIENT
Start: 2024-06-21

## 2024-06-25 ENCOUNTER — CLINICAL SUPPORT (OUTPATIENT)
Dept: PULMONOLOGY | Facility: CLINIC | Age: 89
End: 2024-06-25
Payer: MEDICARE

## 2024-06-25 DIAGNOSIS — J44.9 CHRONIC OBSTRUCTIVE PULMONARY DISEASE, UNSPECIFIED COPD TYPE (HCC): Primary | ICD-10-CM

## 2024-06-25 PROCEDURE — G0239 OTH RESP PROC, GROUP: HCPCS

## 2024-06-27 ENCOUNTER — APPOINTMENT (OUTPATIENT)
Dept: PULMONOLOGY | Facility: CLINIC | Age: 89
End: 2024-06-27
Payer: MEDICARE

## 2024-06-27 ENCOUNTER — APPOINTMENT (EMERGENCY)
Dept: RADIOLOGY | Facility: HOSPITAL | Age: 89
DRG: 871 | End: 2024-06-27
Payer: MEDICARE

## 2024-06-27 ENCOUNTER — HOSPITAL ENCOUNTER (INPATIENT)
Facility: HOSPITAL | Age: 89
LOS: 3 days | Discharge: HOME | DRG: 871 | End: 2024-06-30
Attending: EMERGENCY MEDICINE | Admitting: FAMILY MEDICINE
Payer: MEDICARE

## 2024-06-27 DIAGNOSIS — J44.9 CHRONIC OBSTRUCTIVE PULMONARY DISEASE, UNSPECIFIED COPD TYPE (HCC): ICD-10-CM

## 2024-06-27 DIAGNOSIS — R05.9 COUGH: Primary | ICD-10-CM

## 2024-06-27 DIAGNOSIS — R50.9 FEVER: ICD-10-CM

## 2024-06-27 DIAGNOSIS — R09.02 HYPOXIA: ICD-10-CM

## 2024-06-27 DIAGNOSIS — J18.9 PNEUMONIA: ICD-10-CM

## 2024-06-27 PROBLEM — A41.9 SEPSIS (HCC): Status: ACTIVE | Noted: 2024-06-27

## 2024-06-27 PROBLEM — J96.00 ACUTE RESPIRATORY FAILURE (HCC): Status: ACTIVE | Noted: 2024-06-27

## 2024-06-27 LAB
2HR DELTA HS TROPONIN: 0 NG/L
4HR DELTA HS TROPONIN: 4 NG/L
ALBUMIN SERPL BCG-MCNC: 3.8 G/DL (ref 3.5–5)
ALP SERPL-CCNC: 64 U/L (ref 34–104)
ALT SERPL W P-5'-P-CCNC: 25 U/L (ref 7–52)
AMORPH URATE CRY URNS QL MICRO: ABNORMAL
AST SERPL W P-5'-P-CCNC: 31 U/L (ref 13–39)
ATRIAL RATE: 84 BPM
ATRIAL RATE: 89 BPM
ATRIAL RATE: 90 BPM
BACTERIA UR QL AUTO: ABNORMAL /HPF
BASE EX.OXY STD BLDV CALC-SCNC: 95.5 % (ref 60–80)
BASE EXCESS BLDV CALC-SCNC: 17.6 MMOL/L
BASOPHILS # BLD AUTO: 0.03 THOUSANDS/ÂΜL (ref 0–0.1)
BASOPHILS NFR BLD AUTO: 0 % (ref 0–1)
BILIRUB SERPL-MCNC: 0.7 MG/DL (ref 0.2–1)
BILIRUB UR QL STRIP: NEGATIVE
BNP SERPL-MCNC: 47 PG/ML (ref 0–100)
BUN SERPL-MCNC: 21 MG/DL (ref 5–25)
CALCIUM SERPL-MCNC: 10.5 MG/DL (ref 8.4–10.2)
CARDIAC TROPONIN I PNL SERPL HS: 18 NG/L
CARDIAC TROPONIN I PNL SERPL HS: 18 NG/L
CARDIAC TROPONIN I PNL SERPL HS: 22 NG/L
CHLORIDE SERPL-SCNC: 92 MMOL/L (ref 96–108)
CLARITY UR: ABNORMAL
CO2 SERPL-SCNC: >45 MMOL/L (ref 21–32)
COLOR UR: YELLOW
CREAT SERPL-MCNC: 0.8 MG/DL (ref 0.6–1.3)
EOSINOPHIL # BLD AUTO: 0.11 THOUSAND/ÂΜL (ref 0–0.61)
EOSINOPHIL NFR BLD AUTO: 1 % (ref 0–6)
ERYTHROCYTE [DISTWIDTH] IN BLOOD BY AUTOMATED COUNT: 12.9 % (ref 11.6–15.1)
FLUAV RNA RESP QL NAA+PROBE: NEGATIVE
FLUBV RNA RESP QL NAA+PROBE: NEGATIVE
GFR SERPL CREATININE-BSD FRML MDRD: 80 ML/MIN/1.73SQ M
GLUCOSE SERPL-MCNC: 118 MG/DL (ref 65–140)
GLUCOSE SERPL-MCNC: 140 MG/DL (ref 65–140)
GLUCOSE UR STRIP-MCNC: NEGATIVE MG/DL
HCO3 BLDV-SCNC: 44.4 MMOL/L (ref 24–30)
HCT VFR BLD AUTO: 37.2 % (ref 36.5–49.3)
HGB BLD-MCNC: 10.6 G/DL (ref 12–17)
HGB UR QL STRIP.AUTO: NEGATIVE
IMM GRANULOCYTES # BLD AUTO: 0.04 THOUSAND/UL (ref 0–0.2)
IMM GRANULOCYTES NFR BLD AUTO: 0 % (ref 0–2)
KETONES UR STRIP-MCNC: NEGATIVE MG/DL
LACTATE SERPL-SCNC: 0.8 MMOL/L (ref 0.5–2)
LACTATE SERPL-SCNC: 2.3 MMOL/L (ref 0.5–2)
LEUKOCYTE ESTERASE UR QL STRIP: NEGATIVE
LYMPHOCYTES # BLD AUTO: 0.73 THOUSANDS/ÂΜL (ref 0.6–4.47)
LYMPHOCYTES NFR BLD AUTO: 8 % (ref 14–44)
MCH RBC QN AUTO: 31.4 PG (ref 26.8–34.3)
MCHC RBC AUTO-ENTMCNC: 28.5 G/DL (ref 31.4–37.4)
MCV RBC AUTO: 110 FL (ref 82–98)
MONOCYTES # BLD AUTO: 0.93 THOUSAND/ÂΜL (ref 0.17–1.22)
MONOCYTES NFR BLD AUTO: 10 % (ref 4–12)
MUCOUS THREADS UR QL AUTO: ABNORMAL
NEUTROPHILS # BLD AUTO: 7.31 THOUSANDS/ÂΜL (ref 1.85–7.62)
NEUTS SEG NFR BLD AUTO: 81 % (ref 43–75)
NITRITE UR QL STRIP: NEGATIVE
NON-SQ EPI CELLS URNS QL MICRO: ABNORMAL /HPF
NRBC BLD AUTO-RTO: 0 /100 WBCS
O2 CT BLDV-SCNC: 15.9 ML/DL
P AXIS: 64 DEGREES
P AXIS: 76 DEGREES
P AXIS: 77 DEGREES
PCO2 BLDV: 63.5 MM HG (ref 42–50)
PH BLDV: 7.46 [PH] (ref 7.3–7.4)
PH UR STRIP.AUTO: 7.5 [PH]
PLATELET # BLD AUTO: 133 THOUSANDS/UL (ref 149–390)
PMV BLD AUTO: 9.4 FL (ref 8.9–12.7)
PO2 BLDV: 159.8 MM HG (ref 35–45)
POTASSIUM SERPL-SCNC: 4.8 MMOL/L (ref 3.5–5.3)
PR INTERVAL: 148 MS
PR INTERVAL: 152 MS
PR INTERVAL: 156 MS
PROCALCITONIN SERPL-MCNC: 0.12 NG/ML
PROT SERPL-MCNC: 7.5 G/DL (ref 6.4–8.4)
PROT UR STRIP-MCNC: ABNORMAL MG/DL
QRS AXIS: 63 DEGREES
QRS AXIS: 74 DEGREES
QRS AXIS: 84 DEGREES
QRSD INTERVAL: 84 MS
QRSD INTERVAL: 86 MS
QRSD INTERVAL: 88 MS
QT INTERVAL: 328 MS
QT INTERVAL: 336 MS
QT INTERVAL: 344 MS
QTC INTERVAL: 399 MS
QTC INTERVAL: 406 MS
QTC INTERVAL: 411 MS
RBC # BLD AUTO: 3.38 MILLION/UL (ref 3.88–5.62)
RBC #/AREA URNS AUTO: ABNORMAL /HPF
RSV RNA RESP QL NAA+PROBE: NEGATIVE
SARS-COV-2 RNA RESP QL NAA+PROBE: NEGATIVE
SODIUM SERPL-SCNC: 143 MMOL/L (ref 135–147)
SP GR UR STRIP.AUTO: 1.02 (ref 1–1.03)
T WAVE AXIS: 67 DEGREES
T WAVE AXIS: 69 DEGREES
T WAVE AXIS: 72 DEGREES
UROBILINOGEN UR STRIP-ACNC: 4 MG/DL
VENTRICULAR RATE: 84 BPM
VENTRICULAR RATE: 89 BPM
VENTRICULAR RATE: 90 BPM
WBC # BLD AUTO: 9.15 THOUSAND/UL (ref 4.31–10.16)
WBC #/AREA URNS AUTO: ABNORMAL /HPF

## 2024-06-27 PROCEDURE — 81001 URINALYSIS AUTO W/SCOPE: CPT | Performed by: EMERGENCY MEDICINE

## 2024-06-27 PROCEDURE — 82805 BLOOD GASES W/O2 SATURATION: CPT | Performed by: EMERGENCY MEDICINE

## 2024-06-27 PROCEDURE — 83880 ASSAY OF NATRIURETIC PEPTIDE: CPT | Performed by: EMERGENCY MEDICINE

## 2024-06-27 PROCEDURE — 82948 REAGENT STRIP/BLOOD GLUCOSE: CPT

## 2024-06-27 PROCEDURE — 96374 THER/PROPH/DIAG INJ IV PUSH: CPT

## 2024-06-27 PROCEDURE — 36415 COLL VENOUS BLD VENIPUNCTURE: CPT | Performed by: EMERGENCY MEDICINE

## 2024-06-27 PROCEDURE — 85049 AUTOMATED PLATELET COUNT: CPT | Performed by: FAMILY MEDICINE

## 2024-06-27 PROCEDURE — 85025 COMPLETE CBC W/AUTO DIFF WBC: CPT | Performed by: EMERGENCY MEDICINE

## 2024-06-27 PROCEDURE — 93010 ELECTROCARDIOGRAM REPORT: CPT | Performed by: STUDENT IN AN ORGANIZED HEALTH CARE EDUCATION/TRAINING PROGRAM

## 2024-06-27 PROCEDURE — 80053 COMPREHEN METABOLIC PANEL: CPT | Performed by: EMERGENCY MEDICINE

## 2024-06-27 PROCEDURE — 93010 ELECTROCARDIOGRAM REPORT: CPT | Performed by: INTERNAL MEDICINE

## 2024-06-27 PROCEDURE — 93005 ELECTROCARDIOGRAM TRACING: CPT

## 2024-06-27 PROCEDURE — 96361 HYDRATE IV INFUSION ADD-ON: CPT

## 2024-06-27 PROCEDURE — 83605 ASSAY OF LACTIC ACID: CPT | Performed by: EMERGENCY MEDICINE

## 2024-06-27 PROCEDURE — 99285 EMERGENCY DEPT VISIT HI MDM: CPT

## 2024-06-27 PROCEDURE — 87040 BLOOD CULTURE FOR BACTERIA: CPT | Performed by: EMERGENCY MEDICINE

## 2024-06-27 PROCEDURE — 99223 1ST HOSP IP/OBS HIGH 75: CPT | Performed by: FAMILY MEDICINE

## 2024-06-27 PROCEDURE — 84145 PROCALCITONIN (PCT): CPT | Performed by: FAMILY MEDICINE

## 2024-06-27 PROCEDURE — 99285 EMERGENCY DEPT VISIT HI MDM: CPT | Performed by: EMERGENCY MEDICINE

## 2024-06-27 PROCEDURE — 71045 X-RAY EXAM CHEST 1 VIEW: CPT

## 2024-06-27 PROCEDURE — 84484 ASSAY OF TROPONIN QUANT: CPT | Performed by: EMERGENCY MEDICINE

## 2024-06-27 PROCEDURE — 0241U HB NFCT DS VIR RESP RNA 4 TRGT: CPT | Performed by: EMERGENCY MEDICINE

## 2024-06-27 RX ORDER — IPRATROPIUM BROMIDE AND ALBUTEROL SULFATE 2.5; .5 MG/3ML; MG/3ML
3 SOLUTION RESPIRATORY (INHALATION)
Status: DISCONTINUED | OUTPATIENT
Start: 2024-06-27 | End: 2024-06-30 | Stop reason: HOSPADM

## 2024-06-27 RX ORDER — HYDRALAZINE HYDROCHLORIDE 20 MG/ML
5 INJECTION INTRAMUSCULAR; INTRAVENOUS EVERY 6 HOURS PRN
Status: DISCONTINUED | OUTPATIENT
Start: 2024-06-27 | End: 2024-06-30 | Stop reason: HOSPADM

## 2024-06-27 RX ORDER — PANTOPRAZOLE SODIUM 40 MG/1
40 TABLET, DELAYED RELEASE ORAL
Status: DISCONTINUED | OUTPATIENT
Start: 2024-06-28 | End: 2024-06-30 | Stop reason: HOSPADM

## 2024-06-27 RX ORDER — SODIUM CHLORIDE 9 MG/ML
75 INJECTION, SOLUTION INTRAVENOUS CONTINUOUS
Status: DISPENSED | OUTPATIENT
Start: 2024-06-27 | End: 2024-06-28

## 2024-06-27 RX ORDER — ACETAMINOPHEN 325 MG/1
650 TABLET ORAL EVERY 6 HOURS PRN
Status: DISCONTINUED | OUTPATIENT
Start: 2024-06-27 | End: 2024-06-30 | Stop reason: HOSPADM

## 2024-06-27 RX ORDER — AMOXICILLIN 250 MG
1 CAPSULE ORAL DAILY
Status: DISCONTINUED | OUTPATIENT
Start: 2024-06-28 | End: 2024-06-30 | Stop reason: HOSPADM

## 2024-06-27 RX ORDER — PREDNISONE 20 MG/1
20 TABLET ORAL DAILY
Qty: 15 TABLET | Refills: 0 | Status: SHIPPED | OUTPATIENT
Start: 2024-06-27 | End: 2024-07-12

## 2024-06-27 RX ORDER — AZITHROMYCIN 250 MG/1
TABLET, FILM COATED ORAL
Qty: 6 TABLET | Refills: 0 | Status: SHIPPED | OUTPATIENT
Start: 2024-06-27 | End: 2024-06-30

## 2024-06-27 RX ORDER — ACETAMINOPHEN 650 MG/1
650 SUPPOSITORY RECTAL ONCE
Status: COMPLETED | OUTPATIENT
Start: 2024-06-27 | End: 2024-06-27

## 2024-06-27 RX ORDER — LANOLIN ALCOHOL/MO/W.PET/CERES
3 CREAM (GRAM) TOPICAL
Status: DISCONTINUED | OUTPATIENT
Start: 2024-06-27 | End: 2024-06-30 | Stop reason: HOSPADM

## 2024-06-27 RX ORDER — HEPARIN SODIUM 5000 [USP'U]/ML
5000 INJECTION, SOLUTION INTRAVENOUS; SUBCUTANEOUS EVERY 8 HOURS SCHEDULED
Status: DISCONTINUED | OUTPATIENT
Start: 2024-06-27 | End: 2024-06-30 | Stop reason: HOSPADM

## 2024-06-27 RX ORDER — FORMOTEROL FUMARATE DIHYDRATE 20 UG/2ML
20 SOLUTION RESPIRATORY (INHALATION)
Status: DISCONTINUED | OUTPATIENT
Start: 2024-06-27 | End: 2024-06-30 | Stop reason: HOSPADM

## 2024-06-27 RX ORDER — LOSARTAN POTASSIUM 25 MG/1
25 TABLET ORAL 2 TIMES DAILY
Status: DISCONTINUED | OUTPATIENT
Start: 2024-06-27 | End: 2024-06-30 | Stop reason: HOSPADM

## 2024-06-27 RX ORDER — MOMETASONE FUROATE 1 MG/G
CREAM TOPICAL DAILY
Status: DISCONTINUED | OUTPATIENT
Start: 2024-06-28 | End: 2024-06-29 | Stop reason: SDUPTHER

## 2024-06-27 RX ORDER — BUDESONIDE 0.5 MG/2ML
0.5 INHALANT ORAL
Status: DISCONTINUED | OUTPATIENT
Start: 2024-06-27 | End: 2024-06-30 | Stop reason: HOSPADM

## 2024-06-27 RX ORDER — SODIUM CHLORIDE 9 MG/ML
100 INJECTION, SOLUTION INTRAVENOUS CONTINUOUS
Status: DISCONTINUED | OUTPATIENT
Start: 2024-06-27 | End: 2024-06-27

## 2024-06-27 RX ORDER — ALBUTEROL SULFATE 90 UG/1
2 AEROSOL, METERED RESPIRATORY (INHALATION) EVERY 6 HOURS PRN
Status: DISCONTINUED | OUTPATIENT
Start: 2024-06-27 | End: 2024-06-30 | Stop reason: HOSPADM

## 2024-06-27 RX ORDER — VANCOMYCIN HYDROCHLORIDE 1 G/200ML
1000 INJECTION, SOLUTION INTRAVENOUS EVERY 24 HOURS
Status: DISCONTINUED | OUTPATIENT
Start: 2024-06-28 | End: 2024-06-29

## 2024-06-27 RX ADMIN — CEFEPIME 2000 MG: 2 INJECTION, POWDER, FOR SOLUTION INTRAVENOUS at 18:18

## 2024-06-27 RX ADMIN — SODIUM CHLORIDE 100 ML/HR: 0.9 INJECTION, SOLUTION INTRAVENOUS at 22:19

## 2024-06-27 RX ADMIN — VANCOMYCIN HYDROCHLORIDE 1500 MG: 1 INJECTION, POWDER, LYOPHILIZED, FOR SOLUTION INTRAVENOUS at 22:22

## 2024-06-27 RX ADMIN — SODIUM CHLORIDE 75 ML/HR: 0.9 INJECTION, SOLUTION INTRAVENOUS at 22:21

## 2024-06-27 RX ADMIN — ACETAMINOPHEN 650 MG: 650 SUPPOSITORY RECTAL at 17:13

## 2024-06-27 RX ADMIN — MELATONIN 3 MG: 3 TAB ORAL at 22:28

## 2024-06-27 RX ADMIN — LOSARTAN POTASSIUM 25 MG: 25 TABLET, FILM COATED ORAL at 22:14

## 2024-06-27 RX ADMIN — HEPARIN SODIUM 5000 UNITS: 5000 INJECTION INTRAVENOUS; SUBCUTANEOUS at 22:14

## 2024-06-27 RX ADMIN — SODIUM CHLORIDE 500 ML: 0.9 INJECTION, SOLUTION INTRAVENOUS at 17:07

## 2024-06-27 NOTE — ED NOTES
Pt became very confused. Started to take off 02 mask. Restraints started to keep him from taking it off. Provider dr. Fernandes and Staff at bedside     [facility]  06/27/24 1621       [facility]  06/27/24 1622       [facility]  06/27/24 1625

## 2024-06-27 NOTE — ED PROVIDER NOTES
History  Chief Complaint   Patient presents with    Altered Mental Status     Pt family stated worsening of change of mental status. Worsening of 02 stat on arrival.        History provided by:  Relative  History limited by:  Acuity of condition   used: Son translating and giving history.    Altered Mental Status  Presenting symptoms: confusion    Severity:  Moderate  Most recent episode:  Today  Episode history:  Continuous  Duration:  1 day  Timing:  Constant  Progression:  Worsening  Chronicity:  New  Context comment:  Patient with history of COPD, on 3 L nasal cannula oxygen 24/7, noted to be having worsening cough, shortness of breath, started on azithromycin and prednisone by PCP, worsening O2 today, brought in by son for evaluation  Associated symptoms: fever    Associated symptoms: no abdominal pain, no slurred speech and no vomiting    Associated symptoms comment:  Limited due to acuity of condition  Fever:     Duration:  1 day    Timing:  Intermittent    Max temp PTA:  Unable to specify    Temp source:  Unable to specify    Progression:  Unchanged      Prior to Admission Medications   Prescriptions Last Dose Informant Patient Reported? Taking?   acetaminophen (TYLENOL) 650 mg CR tablet Unknown  No No   Sig: Take 1 tablet (650 mg total) by mouth every 8 (eight) hours as needed for mild pain   albuterol (PROVENTIL HFA,VENTOLIN HFA) 90 mcg/act inhaler 6/27/2024  No Yes   Sig: INHALE 2 PUFFS EVERY 6 (SIX) HOURS AS NEEDED FOR WHEEZING   azithromycin (Zithromax) 250 mg tablet   No No   Sig: Take 2 tablets (500 mg total) by mouth daily for 1 day, THEN 1 tablet (250 mg total) daily for 4 days.   brimonidine tartrate 0.2 % ophthalmic solution Not Taking  Yes No   Sig: INSTILL 1 DROP IN LEFT EYE TWICE DAILY INSTILL 1 DROP IN LEFT EYE DOS VECJONATHAN AL EULALIO   Patient not taking: Reported on 6/27/2024   budesonide (Pulmicort) 0.5 mg/2 mL nebulizer solution Not Taking  No No   Sig: Take 2 mL (0.5 mg  total) by nebulization 2 (two) times a day Rinse mouth after use.   Patient not taking: Reported on 6/27/2024   formoterol (PERFOROMIST) 20 MCG/2ML nebulizer solution Unknown  No No   Sig: Take 2 mL (20 mcg total) by nebulization 2 (two) times a day   ipratropium-albuterol (DUO-NEB) 0.5-2.5 mg/3 mL nebulizer solution 6/27/2024  No Yes   Sig: Take 3 mL by nebulization 2 (two) times a day   losartan (COZAAR) 25 mg tablet 6/27/2024  No Yes   Sig: TAKE ONE TABLET BY MOUTH TWICE DAILYTOMAR 1 TABLETA POR VIA ORAL DOS VECES AL EULALIO   mometasone (ELOCON) 0.1 % lotion Unknown  No No   Sig: Apply topically daily   pantoprazole (PROTONIX) 40 mg tablet 6/27/2024  No Yes   Sig: TAKE 1 TABLET (40 MG TOTAL) BY MOUTH DAILY   predniSONE 20 mg tablet 6/27/2024 at 1430  No Yes   Sig: Take 1 tablet (20 mg total) by mouth daily for 15 days   senna-docusate sodium (SENOKOT S) 8.6-50 mg per tablet Past Week  No Yes   Sig: Take 1 tablet by mouth daily      Facility-Administered Medications: None       Past Medical History:   Diagnosis Date    Acute metabolic encephalopathy 06/13/2020    Age-related cataract of right eye 04/04/2023    patient is medically cleared and presents with low risk of complication with this upcoming R cataract surgery.    Anemia     Aneurysm, aorta, thoracic (HCC)     Appendicolith     Ascending aortic aneurysm (HCC)     3.7    Asthma     BPH (benign prostatic hyperplasia)     CAD (coronary artery disease)     noted on CT scan    CHF (congestive heart failure) (HCC)     COPD (chronic obstructive pulmonary disease) (HCC)     Descending thoracic aortic aneurysm (HCC)     Diabetes mellitus (HCC)     Diverticulosis     Former tobacco use     GERD (gastroesophageal reflux disease)     History of DVT (deep vein thrombosis)     Left leg    History of transfusion     Hypertension     Hypoxemia 04/30/2023    Inguinal hernia     right    Nephrolithiasis     Oxygen dependent     2LNC    Oxygen dependent     Pneumonia      Pre-diabetes     Prostate calculus     PVD (peripheral vascular disease) (HCC)     Recurrent right inguinal hernia w incarcertion 2023    Thoracic aortic aneurysm without rupture (HCC) 2018    Added automatically from request for surgery [id number]    Thrombocytopenia (HCC) 2018    Ulcer     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)     Varicose vein of leg     b/l       Past Surgical History:   Procedure Laterality Date    CARDIAC SURGERY      ESOPHAGOGASTRODUODENOSCOPY      HERNIA REPAIR Right 2019    Procedure: REPAIR HERNIA INGUINAL WITH MESH;  Surgeon: David Lowry MD;  Location:  MAIN OR;  Service: General    HERNIA REPAIR Right 2023    Procedure: REPAIR RECURRENT INCARERATED HERNIA INGUINAL OPEN, RIGHT ORCHIECTOMY;  Surgeon: Mason Bertrand MD;  Location: [facility];  Service: General    INGUINAL HERNIA REPAIR Bilateral     IR TEVAR  2018    MS EVASC RPR DTA COVERAGE ART ORIGIN 1ST ENDOPROSTH N/A 2018    Procedure: TEVAR - endovascular thoracic aortic aneurysm repair;  Surgeon: Gladis Ortega MD;  Location: [facility];  Service: Vascular    THORACIC AORTIC ANEURYSM REPAIR  2018    VARICOSE VEIN SURGERY Bilateral     vein stripping       Family History   Problem Relation Age of Onset    Tuberculosis Mother     No Known Problems Father     Cancer Sister     Diabetes Family     Hypertension Family      I have reviewed and agree with the history as documented.    E-Cigarette/Vaping    E-Cigarette Use Never User      E-Cigarette/Vaping Substances    Nicotine No     THC No     CBD No     Flavoring No     Other No     Unknown No      Social History     Tobacco Use    Smoking status: Former     Current packs/day: 0.00     Average packs/day: 1 pack/day for 35.0 years (35.0 ttl pk-yrs)     Types: Cigarettes     Start date:      Quit date:      Years since quittin.5    Smokeless tobacco: Never   Vaping Use    Vaping status: Never Used   Substance  Use Topics    Alcohol use: Never    Drug use: No       Review of Systems   Unable to perform ROS: Acuity of condition   Constitutional:  Positive for fever.   Gastrointestinal:  Negative for abdominal pain and vomiting.   Psychiatric/Behavioral:  Positive for confusion.        Physical Exam  Physical Exam  Vitals and nursing note reviewed.   Constitutional:       General: He is not in acute distress.     Appearance: He is well-developed.   HENT:      Head: Normocephalic and atraumatic.   Eyes:      Extraocular Movements: Extraocular movements intact.   Cardiovascular:      Rate and Rhythm: Normal rate and regular rhythm.      Heart sounds: Normal heart sounds.   Pulmonary:      Effort: Pulmonary effort is normal.      Breath sounds: Normal breath sounds.   Abdominal:      Palpations: Abdomen is soft.      Tenderness: There is no abdominal tenderness. There is no guarding or rebound.   Musculoskeletal:         General: Normal range of motion.      Cervical back: Normal range of motion and neck supple.   Skin:     General: Skin is warm and dry.   Neurological:      General: No focal deficit present.      Mental Status: He is alert and oriented to person, place, and time.      Cranial Nerves: No cranial nerve deficit or facial asymmetry.   Psychiatric:      Comments: Unable to elicit psychiatric exam, patient was restless, required soft restraints         Vital Signs  ED Triage Vitals   Temperature Pulse Respirations Blood Pressure SpO2   06/27/24 1630 06/27/24 1617 06/27/24 1617 06/27/24 1630 06/27/24 1612   (!) 100.7 °F (38.2 °C) 100 16 (!) 174/82 (!) 66 %      Temp Source Heart Rate Source Patient Position - Orthostatic VS BP Location FiO2 (%)   06/27/24 1630 06/27/24 1617 06/27/24 1630 06/27/24 1630 --   Rectal Monitor Lying Right arm       Pain Score       06/27/24 1713       Med Not Given for Pain - for MAR use only           Vitals:    06/27/24 1830 06/27/24 1900 06/27/24 2000 06/27/24 2145   BP: 162/77 (!)  172/81 158/89 (!) 143/101   Pulse: 84 80 82 78   Patient Position - Orthostatic VS: Lying Lying Lying Lying         Visual Acuity      ED Medications  Medications   ceFEPime (MAXIPIME) 2,000 mg in dextrose 5 % 50 mL IVPB (has no administration in time range)   acetaminophen (TYLENOL) tablet 650 mg (has no administration in time range)   albuterol (PROVENTIL HFA,VENTOLIN HFA) inhaler 2 puff (has no administration in time range)   budesonide (PULMICORT) inhalation solution 0.5 mg ( Nebulization Canceled Entry 6/27/24 2200)   formoterol (PERFOROMIST) nebulizer solution 20 mcg ( Nebulization Canceled Entry 6/27/24 2200)   ipratropium-albuterol (DUO-NEB) 0.5-2.5 mg/3 mL inhalation solution 3 mL ( Nebulization Canceled Entry 6/27/24 2200)   losartan (COZAAR) tablet 25 mg (25 mg Oral Given 6/27/24 2214)   pantoprazole (PROTONIX) EC tablet 40 mg (has no administration in time range)   mometasone (ELOCON) 0.1 % cream (has no administration in time range)   senna-docusate sodium (SENOKOT S) 8.6-50 mg per tablet 1 tablet (has no administration in time range)   heparin (porcine) subcutaneous injection 5,000 Units (5,000 Units Subcutaneous Given 6/27/24 2214)   hydrALAZINE (APRESOLINE) injection 5 mg (has no administration in time range)   melatonin tablet 3 mg (3 mg Oral Given 6/27/24 2228)   vancomycin (VANCOCIN) IVPB (premix in dextrose) 1,000 mg 200 mL (has no administration in time range)   sodium chloride 0.9 % infusion (75 mL/hr Intravenous New Bag 6/27/24 2221)   sodium chloride 0.9 % bolus 500 mL (0 mL Intravenous Stopped 6/27/24 1804)   acetaminophen (TYLENOL) rectal suppository 650 mg (650 mg Rectal Given 6/27/24 1713)   cefepime (MAXIPIME) 2 g/50 mL dextrose IVPB (0 mg Intravenous Stopped 6/27/24 1935)   vancomycin (VANCOCIN) 1500 mg in sodium chloride 0.9% 250 mL IVPB (1,500 mg Intravenous New Bag 6/27/24 4331)       Diagnostic Studies  Results Reviewed       Procedure Component Value Units Date/Time     Procalcitonin [[id number]]  (Normal) Collected: 06/27/24 1624    Lab Status: Final result Specimen: Blood from Arm, Right Updated: 06/27/24 2258     Procalcitonin 0.12 ng/ml     Blood culture #1 [[id number]] Collected: 06/27/24 1624    Lab Status: Preliminary result Specimen: Blood from Arm, Right Updated: 06/27/24 2201     Blood Culture Received in Microbiology Lab. Culture in Progress.    Blood culture #2 [[id number]] Collected: 06/27/24 1638    Lab Status: Preliminary result Specimen: Blood from Hand, Left Updated: 06/27/24 2201     Blood Culture Received in Microbiology Lab. Culture in Progress.    HS Troponin I 4hr [[id number]]  (Normal) Collected: 06/27/24 2047    Lab Status: Final result Specimen: Blood from Arm, Left Updated: 06/27/24 2118     hs TnI 4hr 22 ng/L      Delta 4hr hsTnI 4 ng/L     Lactic acid 2 Hours [[id number]]  (Normal) Collected: 06/27/24 1905    Lab Status: Final result Specimen: Blood from Arm, Right Updated: 06/27/24 1943     LACTIC ACID 0.8 mmol/L     Narrative:      Result may be elevated if tourniquet was used during collection.    HS Troponin I 2hr [[id number]]  (Normal) Collected: 06/27/24 1831    Lab Status: Final result Specimen: Blood from Hand, Left Updated: 06/27/24 1907     hs TnI 2hr 18 ng/L      Delta 2hr hsTnI 0 ng/L     Urine Microscopic [[id number]]  (Abnormal) Collected: 06/27/24 1824    Lab Status: Final result Specimen: Urine, Clean Catch Updated: 06/27/24 1857     RBC, UA 2-4 /hpf      WBC, UA 1-2 /hpf      Epithelial Cells Occasional /hpf      Bacteria, UA None Seen /hpf      MUCUS THREADS Moderate     Amorphous Crystals, UA Occasional    UA w Reflex to Microscopic w Reflex to Culture [[id number]]  (Abnormal) Collected: 06/27/24 1824    Lab Status: Final result Specimen: Urine, Clean Catch Updated: 06/27/24 1851     Color, UA Yellow     Clarity, UA Turbid     Specific Gravity, UA 1.021     pH, UA 7.5     Leukocytes, UA Negative     Nitrite, UA Negative     Protein, UA 30 (1+)  mg/dl      Glucose, UA Negative mg/dl      Ketones, UA Negative mg/dl      Urobilinogen, UA 4.0 mg/dl      Bilirubin, UA Negative     Occult Blood, UA Negative    Fingerstick Glucose (POCT) [[id number]]  (Normal) Collected: 06/27/24 1741    Lab Status: Final result Specimen: Blood Updated: 06/27/24 1742     POC Glucose 118 mg/dl     FLU/RSV/COVID - if FLU/RSV clinically relevant [[id number]]  (Normal) Collected: 06/27/24 1646    Lab Status: Final result Specimen: Nares from Nose Updated: 06/27/24 1742     SARS-CoV-2 Negative     INFLUENZA A PCR Negative     INFLUENZA B PCR Negative     RSV PCR Negative    Narrative:      FOR PEDIATRIC PATIENTS - copy/paste COVID Guidelines URL to browser: [email or web address]    SARS-CoV-2 assay is a Nucleic Acid Amplification assay intended for the  qualitative detection of nucleic acid from SARS-CoV-2 in nasopharyngeal  swabs. Results are for the presumptive identification of SARS-CoV-2 RNA.    Positive results are indicative of infection with SARS-CoV-2, the virus  causing COVID-19, but do not rule out bacterial infection or co-infection  with other viruses. Laboratories within the [place] and its  territories are required to report all positive results to the appropriate  public health authorities. Negative results do not preclude SARS-CoV-2  infection and should not be used as the sole basis for treatment or other  patient management decisions. Negative results must be combined with  clinical observations, patient history, and epidemiological information.  This test has not been FDA cleared or approved.    This test has been authorized by FDA under an Emergency Use Authorization  (EUA). This test is only authorized for the duration of time the  declaration that circumstances exist justifying the authorization of the  emergency use of an in vitro diagnostic tests for detection of SARS-CoV-2  virus and/or diagnosis of  COVID-19 infection under section 564(b)(1) of  the Act, 21 U.S.C. 360bbb-3(b)(1), unless the authorization is terminated  or revoked sooner. The test has been validated but independent review by FDA  and CLIA is pending.    Test performed using [employer] GeneXpert: This RT-PCR assay targets N2,  a region unique to SARS-CoV-2. A conserved region in the E-gene was chosen  for pan-Sarbecovirus detection which includes SARS-CoV-2.    According to CMS-2020-01-R, this platform meets the definition of high-throughput technology.    CBC and differential [[id number]]  (Abnormal) Collected: 06/27/24 1624    Lab Status: Final result Specimen: Blood from Arm, Right Updated: 06/27/24 1732     WBC 9.15 Thousand/uL      RBC 3.38 Million/uL      Hemoglobin 10.6 g/dL      Hematocrit 37.2 %       fL      MCH 31.4 pg      MCHC 28.5 g/dL      RDW 12.9 %      MPV 9.4 fL      Platelets 133 Thousands/uL      nRBC 0 /100 WBCs      Segmented % 81 %      Immature Grans % 0 %      Lymphocytes % 8 %      Monocytes % 10 %      Eosinophils Relative 1 %      Basophils Relative 0 %      Absolute Neutrophils 7.31 Thousands/µL      Absolute Immature Grans 0.04 Thousand/uL      Absolute Lymphocytes 0.73 Thousands/µL      Absolute Monocytes 0.93 Thousand/µL      Eosinophils Absolute 0.11 Thousand/µL      Basophils Absolute 0.03 Thousands/µL     Lactic acid, plasma (w/reflex if result > 2.0) [[id number]]  (Abnormal) Collected: 06/27/24 1624    Lab Status: Final result Specimen: Blood from Arm, Right Updated: 06/27/24 1721     LACTIC ACID 2.3 mmol/L     Narrative:      Result may be elevated if tourniquet was used during collection.    Comprehensive metabolic panel [[id number]]  (Abnormal) Collected: 06/27/24 1624    Lab Status: Final result Specimen: Blood from Arm, Right Updated: 06/27/24 1719     Sodium 143 mmol/L      Potassium 4.8 mmol/L      Chloride 92 mmol/L      CO2 >45 mmol/L      ANION GAP --     BUN 21 mg/dL      Creatinine 0.80 mg/dL       Glucose 140 mg/dL      Calcium 10.5 mg/dL      AST 31 U/L      ALT 25 U/L      Alkaline Phosphatase 64 U/L      Total Protein 7.5 g/dL      Albumin 3.8 g/dL      Total Bilirubin 0.70 mg/dL      eGFR 80 ml/min/1.73sq m     Narrative:      National Kidney Disease Foundation guidelines for Chronic Kidney Disease (CKD):     Stage 1 with normal or high GFR (GFR > 90 mL/min/1.73 square meters)    Stage 2 Mild CKD (GFR = 60-89 mL/min/1.73 square meters)    Stage 3A Moderate CKD (GFR = 45-59 mL/min/1.73 square meters)    Stage 3B Moderate CKD (GFR = 30-44 mL/min/1.73 square meters)    Stage 4 Severe CKD (GFR = 15-29 mL/min/1.73 square meters)    Stage 5 End Stage CKD (GFR <15 mL/min/1.73 square meters)  Note: GFR calculation is accurate only with a steady state creatinine    HS Troponin 0hr (reflex protocol) [[id number]]  (Normal) Collected: 06/27/24 1624    Lab Status: Final result Specimen: Blood from Arm, Right Updated: 06/27/24 1718     hs TnI 0hr 18 ng/L     B-Type Natriuretic Peptide(BNP) [[id number]]  (Normal) Collected: 06/27/24 1624    Lab Status: Final result Specimen: Blood from Arm, Right Updated: 06/27/24 1717     BNP 47 pg/mL     Blood gas, venous [[id number]]  (Abnormal) Collected: 06/27/24 1643    Lab Status: Final result Specimen: Blood from Arm, Right Updated: 06/27/24 1701     pH, Nabeel 7.462     pCO2, Nabeel 63.5 mm Hg      pO2, Nabeel 159.8 mm Hg      HCO3, Nabeel 44.4 mmol/L      Base Excess, Nabeel 17.6 mmol/L      O2 Content, Nabeel 15.9 ml/dL      O2 HGB, VENOUS 95.5 %                    XR chest 1 view portable    (Results Pending)              Procedures  ECG 12 Lead Documentation Only    Date/Time: 6/27/2024 4:24 PM    Performed by: Spencer Fernandes MD  Authorized by: Spencer Fernandes MD    Indications / Diagnosis:  Confusion  ECG reviewed by me, the ED Provider: yes    Patient location:  ED  Interpretation:     Interpretation: normal    Rate:     ECG rate assessment: normal    Rhythm:     Rhythm: sinus rhythm    Ectopy:      Ectopy: none    QRS:     QRS axis:  Normal  Conduction:     Conduction: normal    ST segments:     ST segments:  Normal  T waves:     T waves: normal             ED Course  ED Course as of 06/28/24 0117   Thu Jun 27, 2024   1805 WBC: 9.15   1805 Hemoglobin(!): 10.6   1805 Platelet Count(!): 133   1805 Sodium: 143   1805 Potassium: 4.8   1805 BUN: 21   1805 Creatinine: 0.80   1805 GLUCOSE: 140   1805 LACTIC ACID(!): 2.3   1805 pH, Nabeel(!): 7.462   1805 pCO2, Nabeel(!): 63.5  Labs reviewed.   1805 SARS-COV-2: Negative   1806 INFLU A PCR: Negative   1806 INFLU B PCR: Negative   1806 RSV PCR: Negative  Swab result reviewed, negative.   1812 XR chest 1 view portable  CXR independently reviewed, possible right lower lobe infiltration.  Empiric antibiotics given to cover for possible pneumonia.                               SBIRT 22yo+      Flowsheet Row Most Recent Value   Initial Alcohol Screen: US AUDIT-C     1. How often do you have a drink containing alcohol? 0 Filed at: 06/27/2024 1627   2. How many drinks containing alcohol do you have on a typical day you are drinking?  0 Filed at: 06/27/2024 1627   3b. FEMALE Any Age, or MALE 65+: How often do you have 4 or more drinks on one occassion? 0 Filed at: 06/27/2024 1627   Audit-C Score 0 Filed at: 06/27/2024 1627   ALAN: How many times in the past year have you...    Used an illegal drug or used a prescription medication for non-medical reasons? Never Filed at: 06/27/2024 1627                      Medical Decision Making  Patient is an 88-year-old male, history of COPD on 3 L nasal cannula 24/7, CHF, peripheral vascular disease, comes in with complaints of worsening cough, phlegm, shortness of breath, son states that his primary doctor prescribed azithromycin and prednisone which were given today, patient was noted to have low oxygen, confused.  On exam, patient is conscious, alert, restless, had to be restrained as interfering in medical treatment and for safety, vital  signs noted, blood pressure stable, rectal temp 100.7, O2 sats 60s on arrival, with nonrebreather, O2 sats are 98%, no significant increased work of breathing, lung exam shows diminished entry at bases, abdomen is soft, nondistended, nontender, no peripheral edema, no calf tenderness or swelling.  Differential diagnosis: COPD exacerbation, CHF, pneumonia, viral illness, electrolyte derangement, anemia, ACS, will check cardiac/septic workup, labs, EKG, chest x-ray, give small fluid bolus, give PRN neb treatment.     Problems Addressed:  Cough: acute illness or injury  Fever: acute illness or injury  Hypoxia: acute illness or injury  Pneumonia: acute illness or injury    Amount and/or Complexity of Data Reviewed  Labs: ordered. Decision-making details documented in ED Course.  Radiology: ordered and independent interpretation performed. Decision-making details documented in ED Course.  ECG/medicine tests: ordered and independent interpretation performed. Decision-making details documented in ED Course.    Risk  OTC drugs.  Decision regarding hospitalization.             Disposition  Final diagnoses:   Cough   Fever   Hypoxia   Pneumonia - possible     Time reflects when diagnosis was documented in both MDM as applicable and the Disposition within this note       Time User Action Codes Description Comment    6/27/2024  4:48 PM Alam, Spencer Add [R05.9] Cough     6/27/2024  4:48 PM Alam, Spencer Add [R50.9] Fever     6/27/2024  4:48 PM Alam, Spencer Add [R09.02] Hypoxia     6/27/2024  6:23 PM Alam, Spencer Add [J18.9] Pneumonia     6/27/2024  6:23 PM Alam, Spencer Modify [J18.9] Pneumonia possible          ED Disposition       ED Disposition   Admit    Condition   Stable    Date/Time   Thu Jun 27, 2024 4815    Comment   Case was discussed with Dr. Haque and the patient's admission status was agreed to be Admission Status: inpatient status to the service of Dr. Haque.               Follow-up Information    None          Current Discharge Medication List        CONTINUE these medications which have NOT CHANGED    Details   albuterol (PROVENTIL HFA,VENTOLIN HFA) 90 mcg/act inhaler INHALE 2 PUFFS EVERY 6 (SIX) HOURS AS NEEDED FOR WHEEZING  Qty: 18 g, Refills: 5    Comments: Substitution to a formulary equivalent within the same pharmaceutical class is authorized.  Associated Diagnoses: Chronic respiratory failure with hypoxia and hypercapnia ([facility])      ipratropium-albuterol (DUO-NEB) 0.5-2.5 mg/3 mL nebulizer solution Take 3 mL by nebulization 2 (two) times a day  Qty: 540 mL, Refills: 3    Associated Diagnoses: Chronic obstructive pulmonary disease, unspecified COPD type ([facility])      losartan (COZAAR) 25 mg tablet TAKE ONE TABLET BY MOUTH TWICE DAILYTOMAR 1 TABLETA POR VIA ORAL DOS VECES AL EULALIO  Qty: 180 tablet, Refills: 0    Associated Diagnoses: Benign essential hypertension      pantoprazole (PROTONIX) 40 mg tablet TAKE 1 TABLET (40 MG TOTAL) BY MOUTH DAILY  Qty: 30 tablet, Refills: 5    Associated Diagnoses: Long term (current) use of systemic steroids      predniSONE 20 mg tablet Take 1 tablet (20 mg total) by mouth daily for 15 days  Qty: 15 tablet, Refills: 0    Associated Diagnoses: Chronic obstructive pulmonary disease, unspecified COPD type ([facility])      senna-docusate sodium (SENOKOT S) 8.6-50 mg per tablet Take 1 tablet by mouth daily  Qty: 30 tablet, Refills: 5    Associated Diagnoses: Chronic idiopathic constipation      acetaminophen (TYLENOL) 650 mg CR tablet Take 1 tablet (650 mg total) by mouth every 8 (eight) hours as needed for mild pain  Qty: 90 tablet, Refills: 5    Associated Diagnoses: Chronic bilateral low back pain without sciatica      azithromycin (Zithromax) 250 mg tablet Take 2 tablets (500 mg total) by mouth daily for 1 day, THEN 1 tablet (250 mg total) daily for 4 days.  Qty: 6 tablet, Refills: 0    Associated Diagnoses: Chronic obstructive pulmonary disease, unspecified COPD type ([facility])       brimonidine tartrate 0.2 % ophthalmic solution INSTILL 1 DROP IN LEFT EYE TWICE DAILY INSTILL 1 DROP IN LEFT EYE DOS VECES AL EULALIO      budesonide (Pulmicort) 0.5 mg/2 mL nebulizer solution Take 2 mL (0.5 mg total) by nebulization 2 (two) times a day Rinse mouth after use.  Qty: 360 mL, Refills: 3    Associated Diagnoses: Chronic obstructive pulmonary disease, unspecified COPD type ([facility]CC)      formoterol (PERFOROMIST) 20 MCG/2ML nebulizer solution Take 2 mL (20 mcg total) by nebulization 2 (two) times a day  Qty: 360 mL, Refills: 3    Associated Diagnoses: Chronic obstructive pulmonary disease, unspecified COPD type (HCC)      mometasone (ELOCON) 0.1 % lotion Apply topically daily  Qty: 60 mL, Refills: 5    Associated Diagnoses: Seborrhea             No discharge procedures on file.    PDMP Review         Value Time User    PDMP Reviewed  Yes 5/20/2024 10:35 PM Calin Chacon PA-C            ED Provider  Electronically Signed by             Spencer Fernandes MD  06/28/24 0117

## 2024-06-27 NOTE — ED NOTES
Pt placed on bed alarm      Yumiko Olea  06/27/24 1933     Treatment Number (Optional): 1 How Severe Are Your Warts?: mild Is This A New Presentation, Or A Follow-Up?: Follow Up Arnold

## 2024-06-27 NOTE — ED NOTES
Pt's O2 decreased from 1L NRB to 10L NRB. Pt's mentation improved. Pt removed from restraints x4. Dr. Fernandes aware of same.      Araceli Avalos RN  06/27/24 3424

## 2024-06-27 NOTE — PROGRESS NOTES
"I got a message from Daljit, patient's son that he has a \"resfriado\". Patient has severe COPD. Will empirically treat,    Allergies   Allergen Reactions    Penicillins Hives, Itching and Rash    Lisinopril Rash     Side pains and rash     Zyprexa [Olanzapine] Tongue Swelling       Prednisone,   Zpack, rest continue the same.   I will see him in office in 10 days.      Assessment and Plan     1. Chronic obstructive pulmonary disease, unspecified COPD type (HCC)  -     predniSONE 20 mg tablet; Take 1 tablet (20 mg total) by mouth daily for 15 days  -     azithromycin (Zithromax) 250 mg tablet; Take 2 tablets (500 mg total) by mouth daily for 1 day, THEN 1 tablet (250 mg total) daily for 4 days.      Kirby Casanova MD   [facility]    "

## 2024-06-28 LAB
L PNEUMO1 AG UR QL IA.RAPID: NEGATIVE
PLATELET # BLD AUTO: 111 THOUSANDS/UL (ref 149–390)
PMV BLD AUTO: 9.7 FL (ref 8.9–12.7)
PROCALCITONIN SERPL-MCNC: 0.09 NG/ML
S PNEUM AG UR QL: NEGATIVE

## 2024-06-28 PROCEDURE — 87449 NOS EACH ORGANISM AG IA: CPT | Performed by: FAMILY MEDICINE

## 2024-06-28 PROCEDURE — 94640 AIRWAY INHALATION TREATMENT: CPT

## 2024-06-28 PROCEDURE — 94760 N-INVAS EAR/PLS OXIMETRY 1: CPT

## 2024-06-28 PROCEDURE — 84145 PROCALCITONIN (PCT): CPT | Performed by: FAMILY MEDICINE

## 2024-06-28 PROCEDURE — 97166 OT EVAL MOD COMPLEX 45 MIN: CPT

## 2024-06-28 PROCEDURE — 97163 PT EVAL HIGH COMPLEX 45 MIN: CPT

## 2024-06-28 RX ORDER — LORAZEPAM 2 MG/ML
0.25 INJECTION INTRAMUSCULAR ONCE
Status: DISCONTINUED | OUTPATIENT
Start: 2024-06-28 | End: 2024-06-28

## 2024-06-28 RX ORDER — LORAZEPAM 2 MG/ML
0.25 INJECTION INTRAMUSCULAR ONCE
Status: COMPLETED | OUTPATIENT
Start: 2024-06-28 | End: 2024-06-28

## 2024-06-28 RX ADMIN — IPRATROPIUM BROMIDE AND ALBUTEROL SULFATE 3 ML: 2.5; .5 SOLUTION RESPIRATORY (INHALATION) at 07:19

## 2024-06-28 RX ADMIN — LOSARTAN POTASSIUM 25 MG: 25 TABLET, FILM COATED ORAL at 21:37

## 2024-06-28 RX ADMIN — HYDRALAZINE HYDROCHLORIDE 5 MG: 20 INJECTION, SOLUTION INTRAMUSCULAR; INTRAVENOUS at 06:03

## 2024-06-28 RX ADMIN — SENNOSIDES AND DOCUSATE SODIUM 1 TABLET: 50; 8.6 TABLET ORAL at 09:02

## 2024-06-28 RX ADMIN — CEFEPIME 2000 MG: 2 INJECTION, POWDER, FOR SOLUTION INTRAVENOUS at 04:59

## 2024-06-28 RX ADMIN — PANTOPRAZOLE SODIUM 40 MG: 40 TABLET, DELAYED RELEASE ORAL at 05:04

## 2024-06-28 RX ADMIN — IPRATROPIUM BROMIDE AND ALBUTEROL SULFATE 3 ML: 2.5; .5 SOLUTION RESPIRATORY (INHALATION) at 20:35

## 2024-06-28 RX ADMIN — CEFEPIME 2000 MG: 2 INJECTION, POWDER, FOR SOLUTION INTRAVENOUS at 21:37

## 2024-06-28 RX ADMIN — FORMOTEROL FUMARATE 20 MCG: 20 SOLUTION RESPIRATORY (INHALATION) at 07:19

## 2024-06-28 RX ADMIN — CEFEPIME 2000 MG: 2 INJECTION, POWDER, FOR SOLUTION INTRAVENOUS at 13:13

## 2024-06-28 RX ADMIN — LORAZEPAM 0.25 MG: 2 INJECTION INTRAMUSCULAR; INTRAVENOUS at 01:47

## 2024-06-28 RX ADMIN — FORMOTEROL FUMARATE 20 MCG: 20 SOLUTION RESPIRATORY (INHALATION) at 20:35

## 2024-06-28 RX ADMIN — LOSARTAN POTASSIUM 25 MG: 25 TABLET, FILM COATED ORAL at 09:02

## 2024-06-28 RX ADMIN — BUDESONIDE 0.5 MG: 0.5 INHALANT RESPIRATORY (INHALATION) at 07:19

## 2024-06-28 RX ADMIN — BUDESONIDE 0.5 MG: 0.5 INHALANT RESPIRATORY (INHALATION) at 20:35

## 2024-06-28 RX ADMIN — VANCOMYCIN HYDROCHLORIDE 1000 MG: 1 INJECTION, SOLUTION INTRAVENOUS at 22:36

## 2024-06-28 RX ADMIN — HEPARIN SODIUM 5000 UNITS: 5000 INJECTION INTRAVENOUS; SUBCUTANEOUS at 13:13

## 2024-06-28 RX ADMIN — MELATONIN 3 MG: 3 TAB ORAL at 21:37

## 2024-06-28 RX ADMIN — HEPARIN SODIUM 5000 UNITS: 5000 INJECTION INTRAVENOUS; SUBCUTANEOUS at 21:37

## 2024-06-28 RX ADMIN — HEPARIN SODIUM 5000 UNITS: 5000 INJECTION INTRAVENOUS; SUBCUTANEOUS at 05:02

## 2024-06-28 NOTE — ASSESSMENT & PLAN NOTE
Currently blood pressure is poorly controlled   We will provide with hydralazine as needed  Continue losartan 25 mg twice daily

## 2024-06-28 NOTE — PROGRESS NOTES
Severiano Feliciano is a 88 y.o. male who is currently ordered Vancomycin IV with management by the Pharmacy Consult service.  Relevant clinical data and objective / subjective history reviewed.  Vancomycin Assessment:  Indication and Goal AUC/Trough: Pneumonia (goal -600, trough >10)  Clinical Status: stable  Micro:     Renal Function:  SCr: 0.8 mg/dL  CrCl: 49 mL/min  Renal replacement: Not on dialysis  Days of Therapy: 2  Current Dose: 1000 mg IV q24h  Vancomycin Plan:  New Dosin mg IV every 24 hours  Estimated AUC: 451 mcg*hr/mL  Estimated Trough: 11.1 mcg/mL  Next Level:  with AM labs  Renal Function Monitoring: Daily BMP and UOP  Pharmacy will continue to follow closely for s/sx of nephrotoxicity, infusion reactions and appropriateness of therapy.  BMP and CBC will be ordered per protocol. We will continue to follow the patient’s culture results and clinical progress daily.    Carlos Jones, Pharmacist

## 2024-06-28 NOTE — OCCUPATIONAL THERAPY NOTE
Occupational Therapy Evaluation     Patient Name: Severiano Feliciano  Today's Date: 6/28/2024  Problem List  Principal Problem:    Community acquired pneumonia  Active Problems:    History of DVT (deep vein thrombosis)    Benign essential hypertension    Acute metabolic encephalopathy    Chronic congestive heart failure (HCC)    Chronic obstructive pulmonary disease, unspecified COPD type (HCC)    Chronic respiratory failure with hypoxia and hypercapnia (HCC)    Acute respiratory failure (HCC)    Sepsis (HCC)    Past Medical History  Past Medical History:   Diagnosis Date    Acute metabolic encephalopathy 06/13/2020    Age-related cataract of right eye 04/04/2023    patient is medically cleared and presents with low risk of complication with this upcoming R cataract surgery.    Anemia     Aneurysm, aorta, thoracic (HCC)     Appendicolith     Ascending aortic aneurysm (HCC)     3.7    Asthma     BPH (benign prostatic hyperplasia)     CAD (coronary artery disease)     noted on CT scan    CHF (congestive heart failure) (HCC)     COPD (chronic obstructive pulmonary disease) (HCC)     Descending thoracic aortic aneurysm (HCC)     Diabetes mellitus (HCC)     Diverticulosis     Former tobacco use     GERD (gastroesophageal reflux disease)     History of DVT (deep vein thrombosis)     Left leg    History of transfusion     Hypertension     Hypoxemia 04/30/2023    Inguinal hernia     right    Nephrolithiasis     Oxygen dependent     2LNC    Oxygen dependent     Pneumonia     Pre-diabetes     Prostate calculus     PVD (peripheral vascular disease) (HCC)     Recurrent right inguinal hernia w incarcertion 01/04/2023    Thoracic aortic aneurysm without rupture (HCC) 11/19/2018    Added automatically from request for surgery [id number]    Thrombocytopenia (HCC) 12/29/2018    Ulcer     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)     Varicose vein of leg     b/l     Past Surgical History  Past Surgical History:    Procedure Laterality Date    CARDIAC SURGERY      ESOPHAGOGASTRODUODENOSCOPY      HERNIA REPAIR Right 1/21/2019    Procedure: REPAIR HERNIA INGUINAL WITH MESH;  Surgeon: David Lowry MD;  Location:  MAIN OR;  Service: General    HERNIA REPAIR Right 7/22/2023    Procedure: REPAIR RECURRENT INCARERATED HERNIA INGUINAL OPEN, RIGHT ORCHIECTOMY;  Surgeon: Mason Bertrand MD;  Location: [facility];  Service: General    INGUINAL HERNIA REPAIR Bilateral     IR TEVAR  12/27/2018    RI EVASC RPR DTA COVERAGE ART ORIGIN 1ST ENDOPROSTH N/A 12/27/2018    Procedure: TEVAR - endovascular thoracic aortic aneurysm repair;  Surgeon: Gladis Ortega MD;  Location: [facility];  Service: Vascular    THORACIC AORTIC ANEURYSM REPAIR  12/27/2018    VARICOSE VEIN SURGERY Bilateral     vein stripping           06/28/24 1036   OT Last Visit   OT Visit Date 06/28/24   Note Type   Note type Evaluation   Pain Assessment   Pain Assessment Tool FLACC   Pain Rating: FLACC (Rest) - Face 0   Pain Rating: FLACC (Rest) - Legs 0   Pain Rating: FLACC (Rest) - Activity 0   Pain Rating: FLACC (Rest) - Cry 0   Pain Rating: FLACC (Rest) - Consolability 0   Score: FLACC (Rest) 0   Pain Rating: FLACC (Activity) - Face 0   Pain Rating: FLACC (Activity) - Legs 0   Pain Rating: FLACC (Activity) - Activity 0   Pain Rating: FLACC (Activity) - Cry 0   Pain Rating: FLACC (Activity) - Consolability 0   Score: FLACC (Activity) 0   Restrictions/Precautions   Weight Bearing Precautions Per Order No   Other Precautions Cognitive;Chair Alarm;Bed Alarm;Restraints;O2;Fall Risk;Multiple lines  (3L O2. 4 point restraints)   Home Living   Type of Home House   Home Layout Two level;1/2 bath on main level;Bed/bath upstairs;Stairs to enter with rails  (5 SHYANNE)   Bathroom Shower/Tub Tub/shower unit   Bathroom Toilet Standard   Bathroom Equipment Shower chair   Bathroom Accessibility Accessible   Home Equipment Walker;Cane;Wheelchair-manual;Other (Comment)  (3-4L O2)   Additional  Comments Pt is poor historian, info on home setup and PLOF obtained via pt's son and spouse present during OT eval. Pt lives with spouse in a two level house with 5 SHYANNE and FOS to 2nd floor bed/full bath. 1/2 bath on main level per chart. Pt's son is pt's paid caregiver for 8 hrs/day Mon-Fri.   Prior Function   Level of [place] Needs assistance with ADLs;Needs assistance with functional mobility;Needs assistance with IADLS   Lives With Spouse   Receives Help From Family;Outpatient therapy  (Pt going to OPPT 2x/wk)   IADLs Family/Friend/Other provides transportation;Family/Friend/Other provides meals;Family/Friend/Other provides medication management   Falls in the last 6 months 0   Vocational Retired   Comments At baseline, pt required assist w/ ADLs and IADLs. Pt requires Supervision for functional transfers/mobility w/ use of SPC vs RW. (-) . Denies falls PTA.   Lifestyle   Autonomy At baseline, pt required assist w/ ADLs and IADLs. Pt requires Supervision for functional transfers/mobility w/ use of SPC vs RW. (-) . Denies falls PTA.   Reciprocal Relationships Spouse, son   Service to Others Retired   ADL   Where Assessed Edge of bed   Eating Assistance 7  Independent   Grooming Assistance 5  Supervision/Setup   UB Bathing Assistance 5  Supervision/Setup   LB Bathing Assistance 4  Minimal Assistance   UB Dressing Assistance 5  Supervision/Setup   LB Dressing Assistance 4  Minimal Assistance   Toileting Assistance  4  Minimal Assistance   Bed Mobility   Supine to Sit 5  Supervision   Additional items HOB elevated;Bedrails;Increased time required   Sit to Supine 5  Supervision   Additional items HOB elevated;Increased time required   Additional Comments Pt lying supine with bed alarm activated at end of session. 4 point restraints in place. All needs met and pt reports no further questions for OT at this time.   Transfers   Sit to Stand 5  Supervision   Additional items Bedrails;Increased time  "required;Verbal cues   Stand to Sit 4  Minimal assistance   Additional items Assist x 1;Impulsive;Verbal cues   Additional Comments Cues for safe technique and hand placement   Functional Mobility   Functional Mobility 4  Minimal assistance   Additional Comments Assist x1-2. Pt initially required Min A of 2, progressing to Min A of 1   Additional items Rolling walker   Balance   Static Sitting Fair +   Dynamic Sitting Fair   Static Standing Fair -   Dynamic Standing Poor +   Ambulatory Poor +   Activity Tolerance   Activity Tolerance Patient tolerated treatment well;Other (Comment)  (lethargic at start of session, cognitive deficits)   Medical Staff Made Aware Ivon PT; Samina SPT   Nurse Made Aware yes; SCHUYLER Frank   RUE Assessment   RUE Assessment WFL  (as observed w/ functional activities. Grossly 4-/5 throughout)   LUE Assessment   LUE Assessment WFL  (as observed w/ functional activities. Grossly 4-/5 throughout)   Hand Function   Gross Motor Coordination Functional   Fine Motor Coordination Functional   Sensation   Light Touch No apparent deficits   Proprioception   Proprioception No apparent deficits   Vision - Complex Assessment   Acuity Able to read clock/calendar on wall without difficulty   Psychosocial   Psychosocial (WDL) WDL   Perception   Inattention/Neglect Appears intact   Cognition   Overall Cognitive Status Impaired   Arousal/Participation Alert;Cooperative   Attention Attends with cues to redirect   Orientation Level Oriented to person;Oriented to place;Disoriented to time;Disoriented to situation  (general place-\"Hospital\"; oriented to month but not date or year)   Memory Decreased recall of precautions;Decreased recall of recent events   Following Commands Follows one step commands with increased time or repetition   Comments Lethargic at start of session w/ increased alertness as session progressed   Assessment   Limitation Decreased ADL status;Decreased UE strength;Decreased Safe judgement " during ADL;Decreased cognition;Decreased endurance;Decreased self-care trans;Decreased high-level ADLs   Prognosis Fair   Assessment Pt is a 88 y.o. male seen for OT evaluation s/p adm [facility] on [facility]27/[facility]24 w/ Community acquired pneumonia . Comorbidities affecting pt’s functional performance include a significant PMH of CAD, COPD, chronic CHF, chronic respiratory failure, DM, hypertension, PVD. Pt with active OT orders and activity orders for Up and OOB as tolerated. Pt is poor historian, info on home setup and PLOF obtained via pt's son and spouse present during OT eval. Pt lives with spouse in a two level house with 5 SHYANNE and FOS to 2nd floor bed/full bath. 1/2 bath on main level per chart. Pt's son is pt's paid caregiver for 8 hrs/day Mon-Fri. At baseline, pt required assist w/ ADLs and IADLs. Pt requires Supervision for functional transfers/mobility w/ use of SPC vs RW. (-) . Denies falls PTA. Upon evaluation, pt currently requires Supervision for UB ADLs, Min A for LB ADLs, Min A for toileting, Supervision for bed mobility, Min A-Supervision for transfers, and Min A of 1-2 for functional mobility 2* the following deficits impacting occupational performance: decreased strength , decreased balance, decreased activity tolerance, limited functional reach, impaired memory, impaired problem solving, and decreased safety awareness. These impairments, as well at pt’s personal factors of: SHYANNE home environment, steps within home environment, difficulty performing ADLs, difficulty performing transfers/mobility, limited insight into deficits, and fall risk  limit pt’s ability to safely engage in all baseline areas of occupation. Pt to continue to benefit from continued acute OT services during hospital stay to address defined deficits and to maximize level of functional independence in the following Occupational Performance areas: grooming, bathing/shower, toilet hygiene, dressing, health maintenance,  functional mobility, community mobility, clothing management, and social participation. The patient's raw score on the AM-PAC Daily Activity Inpatient Short Form is 19. A raw score of greater than or equal to 19 suggests the patient may benefit from discharge to home. Please refer to the recommendation of the Occupational Therapist for safe discharge planning. OT will continue to follow pt 2-3x/wk to address the following goals to  w/in 10-14 days:   Goals   Patient Goals Per pt's family: to go home   LTG Time Frame 10-   Long Term Goal Please refer to LTGs listed below   Plan   Treatment Interventions ADL retraining;Functional transfer training;UE strengthening/ROM;Endurance training;Patient/family training;Equipment evaluation/education;Compensatory technique education;Continued evaluation;Activityengagement   Goal Expiration Date 24   OT Treatment Day 0   OT Frequency 2-3x/wk   Discharge Recommendation   Rehab Resource Intensity Level, OT III (Minimum Resource Intensity)   AM-PAC Daily Activity Inpatient   Lower Body Dressing 3   Bathing 3   Toileting 3   Upper Body Dressing 3   Grooming 3   Eating 4   Daily Activity Raw Score 19   Daily Activity Standardized Score (Calc for Raw Score >=11) 40.22   AM-PAC Applied Cognition Inpatient   Following a Speech/Presentation 3   Understanding Ordinary Conversation 3   Taking Medications 2   Remembering Where Things Are Placed or Put Away 3   Remembering List of 4-5 Errands 2   Taking Care of Complicated Tasks 2   Applied Cognition Raw Score 15   Applied Cognition Standardized Score 33.54        GOALS    Pt will improve activity tolerance to G for min 30 min txment sessions for increase engagement in functional tasks    Pt will complete bed mobility at a Mod I level w/ G balance/safety demonstrated to decrease caregiver assistance required     Pt will complete UB dressing/self care w/ mod I using adaptive device and DME as needed     Pt will complete LB  dressing/self care w/ Supervision using adaptive device and DME as needed    Pt will complete toileting w/ Supervision w/ G hygiene/thoroughness using DME as needed    Pt will improve functional transfers to Mod I on/off all surfaces using DME as needed w/ G balance/safety     Pt will improve functional mobility during ADL/IADL/leisure tasks to Mod I using DME as needed w/ G balance/safety     Pt will be attentive 100% of the time during ongoing cognitive assessment w/ G participation to assist w/ safe d/c planning/recommendations    Pt will increase BUE strength by 1MM grade via AROM/AAROM exercises to increase independence in ADLs and transfers    Pt will increase seated tolerance to 5-8 with Fair+ dynamic seated balance to increase safety during participation in ADLs       Helena Silveira, OTR/L

## 2024-06-28 NOTE — UTILIZATION REVIEW
Initial Clinical Review    Admission: Date/Time/Statement:   Admission Orders (From admission, onward)       Ordered        06/27/24 1824  INPATIENT ADMISSION  Once                          Orders Placed This Encounter   Procedures    INPATIENT ADMISSION     Standing Status:   Standing     Number of Occurrences:   1     Order Specific Question:   Level of Care     Answer:   Med Surg [16]     Order Specific Question:   Estimated length of stay     Answer:   More than 2 Midnights     Order Specific Question:   Certification     Answer:   I certify that inpatient services are medically necessary for this patient for a duration of greater than two midnights. See H&P and MD Progress Notes for additional information about the patient's course of treatment.     ED Arrival Information       Expected   -    Arrival   6/27/2024 16:11    Acuity   Emergent              Means of arrival   Wheelchair    Escorted by   Family Member    Service   Hospitalist    Admission type   Emergency              Arrival complaint   Medical problems             Chief Complaint   Patient presents with    Altered Mental Status     Pt family stated worsening of change of mental status. Worsening of 02 stat on arrival.        Initial Presentation: 88 y.o. male w/hx copd, cad, multiple other issues on 3li to ED from home admitted as inpatient due to pneumonia and sepsis. Presented with worsening cough, sob x 3 days for which pcp started zmax and prednisone. Worsened pta, associated with fever. Exam reveals restlessness & confusion requiring soft restraints. Fever 100.7 rectal, hypoxic at 66%., lungs diminished at bases. Tachypneic, tachycardic.  EKG nsr, CXR opacity L base. 6li o2 applied, duonebs in progress, double IV antbx started with infectious workup in progress.     Anticipated Length of Stay/Certification Statement: Patient will be admitted on an inpatient basis with an anticipated length of stay of greater than 2 midnights secondary to  pneumonia.     Date: 6/28   Day 2: very agitated and resistant to care during the night. 4 point restraints required. IM ativan given, IV not functioning, unable to give antbx. New IV started this am. IV hydralazine given for hypertension. Back on 3li o2. 98% sat.    ED Triage Vitals   Temperature Pulse Respirations Blood Pressure SpO2 Pain Score   06/27/24 1630 06/27/24 1617 06/27/24 1617 06/27/24 1630 06/27/24 1612 06/27/24 1713   (!) 100.7 °F (38.2 °C) 100 16 (!) 174/82 (!) 66 % Med Not Given for Pain - for MAR use only     Weight (last 2 days)       Date/Time Weight    06/27/24 2145 55.1 (121.47)            Vital Signs (last 3 days)       Date/Time Temp Pulse Resp BP MAP (mmHg) SpO2 Calculated FIO2 (%) - Nasal Cannula O2 Flow Rate (L/min) Nasal Cannula O2 Flow Rate (L/min) O2 Device Patient Position - Orthostatic VS Pain    06/28/24 10:57:08 97.7 °F (36.5 °C) 77 18 148/74 99 98 % -- -- -- Nasal cannula Lying --    06/28/24 0730 -- -- -- -- -- -- 32 -- 3 L/min Nasal cannula -- No Pain    06/28/24 07:04:40 97.4 °F (36.3 °C) 66 17 166/89 115 94 % -- -- -- Nasal cannula Lying --    06/28/24 06:39:21 98.1 °F (36.7 °C) 67 18 167/92 117 94 % -- -- -- Nasal cannula -- --    06/28/24 06:02:12 97.7 °F (36.5 °C) 79 20 192/104 133 93 % -- -- -- Nasal cannula -- --    06/27/24 2230 -- -- -- -- -- -- -- -- -- -- -- No Pain    06/27/24 2200 -- -- -- -- -- -- 34 -- 3.5 L/min Nasal cannula -- No Pain    06/27/24 2145 97.7 °F (36.5 °C) 78 20 143/101 -- 97 % 44 -- 6 L/min Nasal cannula Lying --    06/27/24 2000 -- 82 18 158/89 118 95 % 44 -- 6 L/min Nasal cannula Lying --    06/27/24 1900 -- 80 20 172/81 116 97 % 44 -- 6 L/min Nasal cannula Lying --    06/27/24 1830 -- 84 28 162/77 110 95 % 44 -- 6 L/min Nasal cannula Lying --    06/27/24 1803 98.2 °F (36.8 °C) 84 16 165/104 -- 99 % 44 -- 6 L/min Nasal cannula Lying --    06/27/24 1751 -- -- -- -- -- -- -- -- -- -- -- No Pain    06/27/24 1750 -- 88 16 171/81 -- 90 % -- 10 L/min --  Non-rebreather mask Lying --    06/27/24 1713 -- -- -- -- -- -- -- -- -- -- -- Med Not Given for Pain - for MAR use only    06/27/24 1700 -- 88 35 148/68 98 99 % -- 15 L/min -- Non-rebreather mask -- --    06/27/24 1656 -- -- -- -- -- -- -- 15 L/min -- Non-rebreather mask -- --    06/27/24 1630 100.7 °F (38.2 °C) 94 43 174/82 118 98 % -- 15 L/min -- Non-rebreather mask Lying --    06/27/24 1622 -- -- -- -- -- 99 % -- 15 L/min -- Non-rebreather mask -- --    06/27/24 1618 -- -- -- -- -- 88 % -- 15 L/min -- Non-rebreather mask -- --    06/27/24 1617 -- 100 16 -- -- 85 % 44 -- 6 L/min Nasal cannula -- --    06/27/24 1612 -- -- -- -- -- 66 % 36 -- 4 L/min Nasal cannula -- --              Pertinent Labs/Diagnostic Test Results:   Radiology:  XR chest 1 view portable   Final Interpretation by Gustavo Waters MD (06/28 2327)      Platelike opacities at the right greater than left base likely representing combination of chronic scarring and atelectasis unless there is compelling clinical evidence for superimposed pneumonia.            Workstation performed: [id number]           Cardiology:  ECG 12 lead   Final Result by Paul London MD (06/27 2247)    Age and gender specific ECG analysis    Normal sinus rhythm   Septal infarct (cited on or before 27-JUN-2024)   Abnormal ECG   When compared with ECG of 27-JUN-2024 18:27, (unconfirmed)   No significant change was found   Confirmed by Paul London ([id number]) on 6/27/2024 10:47:10 PM      ECG 12 lead   Final Result by Paul London MD (06/27 2249)    Age and gender specific ECG analysis    Normal sinus rhythm   Septal infarct , age undetermined   ST elevation, consider early repolarization, pericarditis, or injury   Abnormal ECG   When compared with ECG of 27-JUN-2024 16:24,   No significant change was found   Confirmed by Paul London ([id number]) on 6/27/2024 10:49:14 PM        GI:  No orders to display       Results from last 7 days   Lab Units  "06/27/24  1646   SARS-COV-2  Negative     Results from last 7 days   Lab Units 06/27/24  2327 06/27/24  1624   WBC Thousand/uL  --  9.15   HEMOGLOBIN g/dL  --  10.6*   HEMATOCRIT %  --  37.2   PLATELETS Thousands/uL 111* 133*   TOTAL NEUT ABS Thousands/µL  --  7.31         Results from last 7 days   Lab Units 06/27/24  1624   SODIUM mmol/L 143   POTASSIUM mmol/L 4.8   CHLORIDE mmol/L 92*   CO2 mmol/L >45*   BUN mg/dL 21   CREATININE mg/dL 0.80   EGFR ml/min/1.73sq m 80   CALCIUM mg/dL 10.5*     Results from last 7 days   Lab Units 06/27/24  1624   AST U/L 31   ALT U/L 25   ALK PHOS U/L 64   TOTAL PROTEIN g/dL 7.5   ALBUMIN g/dL 3.8   TOTAL BILIRUBIN mg/dL 0.70     Results from last 7 days   Lab Units 06/27/24  1741   POC GLUCOSE mg/dl 118     Results from last 7 days   Lab Units 06/27/24  1624   GLUCOSE RANDOM mg/dL 140             No results found for: \"BETA-HYDROXYBUTYRATE\"       Results from last 7 days   Lab Units 06/27/24  1643   PH DUSTIN  7.462*   PCO2 DUSTIN mm Hg 63.5*   PO2 DUSTIN mm Hg 159.8*   HCO3 DUSTIN mmol/L 44.4*   BASE EXC DUSTIN mmol/L 17.6   O2 CONTENT DUSTIN ml/dL 15.9   O2 HGB, VENOUS % 95.5*             Results from last 7 days   Lab Units 06/27/24  2047 06/27/24  1831 06/27/24  1624   HS TNI 0HR ng/L  --   --  18   HS TNI 2HR ng/L  --  18  --    HSTNI D2 ng/L  --  0  --    HS TNI 4HR ng/L 22  --   --    HSTNI D4 ng/L 4  --   --                  Results from last 7 days   Lab Units 06/28/24  0431 06/27/24  1624   PROCALCITONIN ng/ml 0.09 0.12     Results from last 7 days   Lab Units 06/27/24  1905 06/27/24  1624   LACTIC ACID mmol/L 0.8 2.3*             Results from last 7 days   Lab Units 06/27/24  1624   BNP pg/mL 47       Results from last 7 days   Lab Units 06/27/24  1824   CLARITY UA  Turbid   COLOR UA  Yellow   SPEC GRAV UA  1.021   PH UA  7.5   GLUCOSE UA mg/dl Negative   KETONES UA mg/dl Negative   BLOOD UA  Negative   PROTEIN UA mg/dl 30 (1+)*   NITRITE UA  Negative   BILIRUBIN UA  Negative "   UROBILINOGEN UA (BE) mg/dl 4.0*   LEUKOCYTES UA  Negative   WBC UA /hpf 1-2   RBC UA /hpf 2-4*   BACTERIA UA /hpf None Seen   EPITHELIAL CELLS WET PREP /hpf Occasional   MUCUS THREADS  Moderate*     Results from last 7 days   Lab Units 06/27/24  1646   INFLUENZA A PCR  Negative   INFLUENZA B PCR  Negative   RSV PCR  Negative       Results from last 7 days   Lab Units 06/27/24  1638 06/27/24  1624   BLOOD CULTURE  Received in Microbiology Lab. Culture in Progress. Received in Microbiology Lab. Culture in Progress.     ED Treatment-Medication Administration from 06/27/2024 1611 to 06/27/2024 2137         Date/Time Order Dose Route Action     06/27/2024 1707 sodium chloride 0.9 % bolus 500 mL 500 mL Intravenous New Bag     06/27/2024 1713 acetaminophen (TYLENOL) rectal suppository 650 mg 650 mg Rectal Given     06/27/2024 1818 cefepime (MAXIPIME) 2 g/50 mL dextrose IVPB 2,000 mg Intravenous New Bag            Past Medical History:   Diagnosis Date    Acute metabolic encephalopathy 06/13/2020    Age-related cataract of right eye 04/04/2023    patient is medically cleared and presents with low risk of complication with this upcoming R cataract surgery.    Anemia     Aneurysm, aorta, thoracic (HCC)     Appendicolith     Ascending aortic aneurysm (HCC)     3.7    Asthma     BPH (benign prostatic hyperplasia)     CAD (coronary artery disease)     noted on CT scan    CHF (congestive heart failure) (HCC)     COPD (chronic obstructive pulmonary disease) (HCC)     Descending thoracic aortic aneurysm (HCC)     Diabetes mellitus (HCC)     Diverticulosis     Former tobacco use     GERD (gastroesophageal reflux disease)     History of DVT (deep vein thrombosis)     Left leg    History of transfusion     Hypertension     Hypoxemia 04/30/2023    Inguinal hernia     right    Nephrolithiasis     Oxygen dependent     2LNC    Oxygen dependent     Pneumonia     Pre-diabetes     Prostate calculus     PVD (peripheral vascular disease)  (HCC)     Recurrent right inguinal hernia w incarcertion 01/04/2023    Thoracic aortic aneurysm without rupture (HCC) 11/19/2018    Added automatically from request for surgery [id number]    Thrombocytopenia (HCC) 12/29/2018    Ulcer     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 male  Admission Orders:  Scheduled Medications:  budesonide, 0.5 mg, Nebulization, BID  cefepime, 2,000 mg, Intravenous, Q8H  formoterol, 20 mcg, Nebulization, BID  heparin (porcine), 5,000 Units, Subcutaneous, Q8H KIKE  ipratropium-albuterol, 3 mL, Nebulization, BID  losartan, 25 mg, Oral, BID  melatonin, 3 mg, Oral, HS  mometasone, , Topical, Daily  pantoprazole, 40 mg, Oral, Early Morning  senna-docusate sodium, 1 tablet, Oral, Daily  vancomycin, 1,000 mg, Intravenous, Q24H    IV ativan .25mg x 1 6/28 @0147    Continuous IV Infusions:sodium chloride 0.9 % infusion  Rate: 75 mL/hr Dose: 75 mL/hr  Freq: Continuous Route: IV  Last Dose: Stopped (06/28/24 0842)  Start: 06/27/24 2230 End: 06/28/24 0820     PRN Meds:  acetaminophen, 650 mg, Oral, Q6H PRN  albuterol, 2 puff, Inhalation, Q6H PRN  hydrALAZINE, 5 mg, Intravenous, Q6H PRN x 1 6/28        IP CONSULT TO PHARMACY    Network Utilization Review Department  ATTENTION: Please call with any questions or concerns to [phone] and carefully listen to the prompts so that you are directed to the right person. All voicemails are confidential.   For Discharge needs, contact Care Management DC Support Team at  [phone] opt. 2  Send all requests for admission clinical reviews, approved or denied determinations and any other requests to dedicated fax number below belonging to the campus where the patient is receiving treatment. List of dedicated fax numbers for the Facilities:  FACILITY NAME UR FAX NUMBER   ADMISSION DENIALS (Administrative/Medical Necessity) [phone]   DISCHARGE SUPPORT TEAM (NETWORK) [phone]   PARENT CHILD HEALTH (Maternity/NICU/Pediatrics) [phone]   [facility] [phone]   [facility] [phone]   Critical access hospital [phone]   [facility] [phone]   [facility] [phone]   [facility] [phone]   [facility] [phone]   [facility] [phone]   [facility] [phone]   [facility] [phone]   [facility] [phone]   [facility] [phone]

## 2024-06-28 NOTE — ASSESSMENT & PLAN NOTE
Patient with a new cough for the past 3 days, history of recent hospitalization and COPD, chronic oxygen use.  Patient presented with fever to the emergency room  Currently doing much better and is at the baseline with oxygen  Patient with a history of MRSA  We will provide with cefepime and vancomycin due to high risk factors for multiple infections  Reassess tomorrow  Currently hemodynamically stable

## 2024-06-28 NOTE — PROGRESS NOTES
Pt very agitated and resistant to care, new order obtained for 4point restraints. Ativan administered IM for agitation. Iv is not working, unable to give 0200 cefepime. SLIM aware, will try to get new iv once ativan is effective.

## 2024-06-28 NOTE — ASSESSMENT & PLAN NOTE
Patient presented with sepsis on admission  Source of sepsis is pneumonia due to patient was being at high risk due to recent hospitalization and chronic COPD  Patient presented with fever, lactic acidosis, acute respiratory failure, tachypnea and tachycardia  Will treat empirically with cefepime and vancomycin  Blood cultures have been sent  Will provide with hydration due to lactic acidosis

## 2024-06-28 NOTE — PLAN OF CARE
Problem: OCCUPATIONAL THERAPY ADULT  Goal: Performs self-care activities at highest level of function for planned discharge setting.  See evaluation for individualized goals.  Description: Treatment Interventions: ADL retraining, Functional transfer training, UE strengthening/ROM, Endurance training, Patient/family training, Equipment evaluation/education, Compensatory technique education, Continued evaluation, Activityengagement          See flowsheet documentation for full assessment, interventions and recommendations.   Note: Limitation: Decreased ADL status, Decreased UE strength, Decreased Safe judgement during ADL, Decreased cognition, Decreased endurance, Decreased self-care trans, Decreased high-level ADLs  Prognosis: Fair  Assessment: Pt is a 88 y.o. male seen for OT evaluation s/p adm to [facility] on [facility]/27/[facility]24 w/ Community acquired pneumonia . Comorbidities affecting pt’s functional performance include a significant PMH of CAD, COPD, chronic CHF, chronic respiratory failure, DM, hypertension, PVD. Pt with active OT orders and activity orders for Up and OOB as tolerated. Pt is poor historian, info on home setup and PLOF obtained via pt's son and spouse present during OT eval. Pt lives with spouse in a two level house with 5 SHYANNE and FOS to 2nd floor bed/full bath. 1/2 bath on main level per chart. Pt's son is pt's paid caregiver for 8 hrs/day Mon-Fri. At baseline, pt required assist w/ ADLs and IADLs. Pt requires Supervision for functional transfers/mobility w/ use of SPC vs RW. (-) . Denies falls PTA. Upon evaluation, pt currently requires Supervision for UB ADLs, Min A for LB ADLs, Min A for toileting, Supervision for bed mobility, Min A-Supervision for transfers, and Min A of 1-2 for functional mobility 2* the following deficits impacting occupational performance: decreased strength , decreased balance, decreased activity tolerance, limited functional reach, impaired memory, impaired  problem solving, and decreased safety awareness. These impairments, as well at pt’s personal factors of: SHYANNE home environment, steps within home environment, difficulty performing ADLs, difficulty performing transfers/mobility, limited insight into deficits, and fall risk  limit pt’s ability to safely engage in all baseline areas of occupation. Pt to continue to benefit from continued acute OT services during hospital stay to address defined deficits and to maximize level of functional independence in the following Occupational Performance areas: grooming, bathing/shower, toilet hygiene, dressing, health maintenance, functional mobility, community mobility, clothing management, and social participation. The patient's raw score on the -PAC Daily Activity Inpatient Short Form is 19. A raw score of greater than or equal to 19 suggests the patient may benefit from discharge to home. Please refer to the recommendation of the Occupational Therapist for safe discharge planning. OT will continue to follow pt 2-3x/wk to address the following goals to  w/in 10-14 days:     Rehab Resource Intensity Level, OT: III (Minimum Resource Intensity)

## 2024-06-28 NOTE — PROGRESS NOTES
Severiano Feliciano is a 88 y.o. male who is currently ordered Vancomycin IV with management by the Pharmacy Consult service.  Relevant clinical data and objective / subjective history reviewed.  Vancomycin Assessment:  Indication and Goal AUC/Trough: Pneumonia (goal -600, trough >10)  Micro:     Renal Function:  SCr: 0.8 mg/dL  CrCl: 48.2 mL/min  Renal replacement: Not on dialysis  Days of Therapy: 1  Current Dose: 1500 mg IV loading dose  Vancomycin Plan:  New Dosin mg IV every 24 hours  Estimated AUC: 417 mcg*hr/mL  Estimated Trough: 10 mcg/mL  Next Level: 2024 with AM labs  Renal Function Monitoring: Daily BMP and UOP  Pharmacy will continue to follow closely for s/sx of nephrotoxicity, infusion reactions and appropriateness of therapy.  BMP and CBC will be ordered per protocol. We will continue to follow the patient’s culture results and clinical progress daily.    Lisa Mae, Pharmacist

## 2024-06-28 NOTE — ASSESSMENT & PLAN NOTE
Acute on chronic respiratory  failure requiring 6 L of oxygen  Currently, on 3-1/2 L of oxygen which is his baseline

## 2024-06-28 NOTE — PLAN OF CARE
Problem: Potential for Falls  Goal: Patient will remain free of falls  Description: INTERVENTIONS:  - Educate patient/family on patient safety including physical limitations  - Instruct patient to call for assistance with activity   - Consult OT/PT to assist with strengthening/mobility   - Keep Call bell within reach  - Keep bed low and locked with side rails adjusted as appropriate  - Keep care items and personal belongings within reach  - Initiate and maintain comfort rounds  - Make Fall Risk Sign visible to staff  - Offer Toileting every  Hours, in advance of need  - Initiate/Maintain alarm  - Obtain necessary fall risk management equipment:   - Apply yellow socks and bracelet for high fall risk patients  - Consider moving patient to room near nurses station  Outcome: Progressing     Problem: Prexisting or High Potential for Compromised Skin Integrity  Goal: Skin integrity is maintained or improved  Description: INTERVENTIONS:  - Identify patients at risk for skin breakdown  - Assess and monitor skin integrity  - Assess and monitor nutrition and hydration status  - Monitor labs   - Assess for incontinence   - Turn and reposition patient  - Assist with mobility/ambulation  - Relieve pressure over bony prominences  - Avoid friction and shearing  - Provide appropriate hygiene as needed including keeping skin clean and dry  - Evaluate need for skin moisturizer/barrier cream  - Collaborate with interdisciplinary team   - Patient/family teaching  - Consider wound care consult   Outcome: Progressing     Problem: PAIN - ADULT  Goal: Verbalizes/displays adequate comfort level or baseline comfort level  Description: Interventions:  - Encourage patient to monitor pain and request assistance  - Assess pain using appropriate pain scale  - Administer analgesics based on type and severity of pain and evaluate response  - Implement non-pharmacological measures as appropriate and evaluate response  - Consider cultural and  social influences on pain and pain management  - Notify physician/advanced practitioner if interventions unsuccessful or patient reports new pain  Outcome: Progressing     Problem: INFECTION - ADULT  Goal: Absence or prevention of progression during hospitalization  Description: INTERVENTIONS:  - Assess and monitor for signs and symptoms of infection  - Monitor lab/diagnostic results  - Monitor all insertion sites, i.e. indwelling lines, tubes, and drains  - Monitor endotracheal if appropriate and nasal secretions for changes in amount and color  - [place] appropriate cooling/warming therapies per order  - Administer medications as ordered  - Instruct and encourage patient and family to use good hand hygiene technique  - Identify and instruct in appropriate isolation precautions for identified infection/condition  Outcome: Progressing  Goal: Absence of fever/infection during neutropenic period  Description: INTERVENTIONS:  - Monitor WBC    Outcome: Progressing     Problem: SAFETY ADULT  Goal: Patient will remain free of falls  Description: INTERVENTIONS:  - Educate patient/family on patient safety including physical limitations  - Instruct patient to call for assistance with activity   - Consult OT/PT to assist with strengthening/mobility   - Keep Call bell within reach  - Keep bed low and locked with side rails adjusted as appropriate  - Keep care items and personal belongings within reach  - Initiate and maintain comfort rounds  - Make Fall Risk Sign visible to staff  - Offer Toileting every  Hours, in advance of need  - Initiate/Maintain alarm  - Obtain necessary fall risk management equipment:   - Apply yellow socks and bracelet for high fall risk patients  - Consider moving patient to room near nurses station  Outcome: Progressing  Goal: Maintain or return to baseline ADL function  Description: INTERVENTIONS:  -  Assess patient's ability to carry out ADLs; assess patient's baseline for ADL function and  identify physical deficits which impact ability to perform ADLs (bathing, care of mouth/teeth, toileting, grooming, dressing, etc.)  - Assess/evaluate cause of self-care deficits   - Assess range of motion  - Assess patient's mobility; develop plan if impaired  - Assess patient's need for assistive devices and provide as appropriate  - Encourage maximum independence but intervene and supervise when necessary  - Involve family in performance of ADLs  - Assess for home care needs following discharge   - Consider OT consult to assist with ADL evaluation and planning for discharge  - Provide patient education as appropriate  Outcome: Progressing  Goal: Maintains/Returns to pre admission functional level  Description: INTERVENTIONS:  - Perform AM-PAC 6 Click Basic Mobility/ Daily Activity assessment daily.  - Set and communicate daily mobility goal to care team and patient/family/caregiver.   - Collaborate with rehabilitation services on mobility goals if consulted  - Perform Range of Motion  times a day.  - Reposition patient every  hours.  - Dangle patient  times a day  - Stand patient  times a day  - Ambulate patient  times a day  - Out of bed to chair  times a day   - Out of bed for meals  times a day  - Out of bed for toileting  - Record patient progress and toleration of activity level   Outcome: Progressing     Problem: DISCHARGE PLANNING  Goal: Discharge to home or other facility with appropriate resources  Description: INTERVENTIONS:  - Identify barriers to discharge w/patient and caregiver  - Arrange for needed discharge resources and transportation as appropriate  - Identify discharge learning needs (meds, wound care, etc.)  - Arrange for interpretive services to assist at discharge as needed  - Refer to Case Management Department for coordinating discharge planning if the patient needs post-hospital services based on physician/advanced practitioner order or complex needs related to functional status,  cognitive ability, or social support system  Outcome: Progressing     Problem: Knowledge Deficit  Goal: Patient/family/caregiver demonstrates understanding of disease process, treatment plan, medications, and discharge instructions  Description: Complete learning assessment and assess knowledge base.  Interventions:  - Provide teaching at level of understanding  - Provide teaching via preferred learning methods  Outcome: Progressing     Problem: CARDIOVASCULAR - ADULT  Goal: Absence of cardiac dysrhythmias or at baseline rhythm  Description: INTERVENTIONS:  - Continuous cardiac monitoring, vital signs, obtain 12 lead EKG if ordered  - Administer antiarrhythmic and heart rate control medications as ordered  - Monitor electrolytes and administer replacement therapy as ordered  Outcome: Progressing     Problem: RESPIRATORY - ADULT  Goal: Achieves optimal ventilation and oxygenation  Description: INTERVENTIONS:  - Assess for changes in respiratory status  - Assess for changes in mentation and behavior  - Position to facilitate oxygenation and minimize respiratory effort  - Oxygen administered by appropriate delivery if ordered  - Initiate smoking cessation education as indicated  - Encourage broncho-pulmonary hygiene including cough, deep breathe, Incentive Spirometry  - Assess the need for suctioning and aspirate as needed  - Assess and instruct to report SOB or any respiratory difficulty  - Respiratory Therapy support as indicated  Outcome: Progressing

## 2024-06-28 NOTE — PHYSICAL THERAPY NOTE
PT EVALUATION    Pt. Name: Severiano Feliciano  Pt. Age: 88 y.o.  MRN: [id number]  LENGTH OF STAY: 1      Admitting Diagnoses:   Cough [R05.9]  Altered mental status [R41.82]  Pneumonia [J18.9]  Hypoxia [R09.02]  Fever [R50.9]    Past Medical History:   Diagnosis Date    Acute metabolic encephalopathy 06/13/2020    Age-related cataract of right eye 04/04/2023    patient is medically cleared and presents with low risk of complication with this upcoming R cataract surgery.    Anemia     Aneurysm, aorta, thoracic (HCC)     Appendicolith     Ascending aortic aneurysm (HCC)     3.7    Asthma     BPH (benign prostatic hyperplasia)     CAD (coronary artery disease)     noted on CT scan    CHF (congestive heart failure) (HCC)     COPD (chronic obstructive pulmonary disease) (HCC)     Descending thoracic aortic aneurysm (HCC)     Diabetes mellitus (HCC)     Diverticulosis     Former tobacco use     GERD (gastroesophageal reflux disease)     History of DVT (deep vein thrombosis)     Left leg    History of transfusion     Hypertension     Hypoxemia 04/30/2023    Inguinal hernia     right    Nephrolithiasis     Oxygen dependent     2LNC    Oxygen dependent     Pneumonia     Pre-diabetes     Prostate calculus     PVD (peripheral vascular disease) (HCC)     Recurrent right inguinal hernia w incarcertion 01/04/2023    Thoracic aortic aneurysm without rupture (HCC) 11/19/2018    Added automatically from request for surgery [id number]    Thrombocytopenia (HCC) 12/29/2018    Ulcer     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)     Varicose vein of leg     b/l       Past Surgical History:   Procedure Laterality Date    CARDIAC SURGERY      ESOPHAGOGASTRODUODENOSCOPY      HERNIA REPAIR Right 1/21/2019    Procedure: REPAIR HERNIA INGUINAL WITH MESH;  Surgeon: David Lowry MD;  Location:  MAIN OR;  Service: General    HERNIA REPAIR Right 7/22/2023    Procedure: REPAIR RECURRENT INCARERATED HERNIA INGUINAL OPEN,  RIGHT ORCHIECTOMY;  Surgeon: Mason Bertrand MD;  Location: [facility];  Service: General    INGUINAL HERNIA REPAIR Bilateral     IR TEVAR  12/27/2018    CT EVASC RPR DTA COVERAGE ART ORIGIN 1ST ENDOPROSTH N/A 12/27/2018    Procedure: TEVAR - endovascular thoracic aortic aneurysm repair;  Surgeon: Gladis Ortega MD;  Location: [facility];  Service: Vascular    THORACIC AORTIC ANEURYSM REPAIR  12/27/2018    VARICOSE VEIN SURGERY Bilateral     vein stripping       Imaging Studies:  XR chest 1 view portable   Final Result by Gustavo Waters MD (06/28 0996)      Platelike opacities at the right greater than left base likely representing combination of chronic scarring and atelectasis unless there is compelling clinical evidence for superimposed pneumonia.            Workstation performed: [id number]               06/28/24 1019   PT Last Visit   PT Visit Date 06/28/24   Note Type   Note type Evaluation   Pain Assessment   Pain Assessment Tool FLACC   Pain Score No Pain   Pain Rating: FLACC (Rest) - Face 0   Pain Rating: FLACC (Rest) - Legs 0   Pain Rating: FLACC (Rest) - Activity 0   Pain Rating: FLACC (Rest) - Cry 0   Pain Rating: FLACC (Rest) - Consolability 0   Score: FLACC (Rest) 0   Pain Rating: FLACC (Activity) - Face 0   Pain Rating: FLACC (Activity) - Legs 0   Pain Rating: FLACC (Activity) - Activity 0   Pain Rating: FLACC (Activity) - Cry 0   Pain Rating: FLACC (Activity) - Consolability 0   Score: FLACC (Activity) 0   Restrictions/Precautions   Weight Bearing Precautions Per Order No   Other Precautions Cognitive;Bed Alarm;Restraints;Fall Risk;Telemetry;O2;Multiple lines  (3 LO2 NC)   Home Living   Type of Home House   Home Layout Two level;1/2 bath on main level;Bed/bath upstairs;Stairs to enter with rails  (5 SHYANNE; FOS to bed/bath)   Bathroom Shower/Tub Tub/shower unit   Bathroom Toilet Standard   Bathroom Equipment Shower chair   Home Equipment Walker;Cane;Wheelchair-manual   Additional Comments pt poor  historian, information gathered from son and spouse; reports baseline O2 use at 3-4L   Prior Function   Level of [place] Needs assistance with ADLs;Needs assistance with functional mobility;Needs assistance with IADLS  (w/ occasional use of RW or SPC for mobility)   Lives With Spouse   Receives Help From Family  (son is caregiver (M-F, 8hrs/day))   Falls in the last 6 months 0   Vocational Retired   Comments pt poor historian, information gathered from son and spouse; (-) home alone   General   Additional Pertinent History h/o COPD, DVT, AAA, CHF, HTN, PVD   Family/Caregiver Present Yes  (wife and son)   Cognition   Overall Cognitive Status Impaired   Arousal/Participation Arousable   Orientation Level Oriented to person;Oriented to place;Disoriented to time;Disoriented to situation  (general place; oriented to month but not day or year)   Following Commands Follows one step commands with increased time or repetition   Comments cooperative   Subjective   Subjective Pt agreeable to PT/OT eval   RUE Assessment   RUE Assessment   (refer to OT)   LUE Assessment   LUE Assessment   (refer to OT)   RLE Assessment   RLE Assessment WFL  (MMT not formally assessed; observed to be WFL during gait)   LLE Assessment   LLE Assessment WFL  (MMT not formally assessed; observed to be WFL during gait)   Bed Mobility   Supine to Sit 5  Supervision   Additional items HOB elevated;Bedrails   Sit to Supine 5  Supervision   Additional items HOB elevated   Transfers   Sit to Stand 5  Supervision   Additional items Bedrails;Verbal cues  (w/ RW)   Stand to Sit 4  Minimal assistance   Additional items Impulsive;Verbal cues  (w/ RW)   Additional Comments verbal cues for technique and safety   Ambulation/Elevation   Gait pattern Wide RICHARD;Inconsistent rafia;Step through pattern   Gait Assistance 4  Minimal assist   Additional items Assist x 1   Assistive Device Rolling walker   Distance ~120'x1   Ambulation/Elevation Additional Comments  initially min Ax2 for amb in room but able to progress to min Ax1 out in hallway; no gross LOB   Balance   Static Sitting Fair +   Dynamic Sitting Fair   Static Standing Fair -  (w/ RW)   Dynamic Standing Poor +  (w/ RW)   Ambulatory Poor +  (w/ RW)   Endurance Deficit   Endurance Deficit No   Activity Tolerance   Activity Tolerance Patient tolerated treatment well   Medical Staff Made Aware OTR [place]   Nurse Made Aware yes, Zhanna   Assessment   Prognosis Fair   Problem List Impaired balance;Decreased mobility;Decreased cognition;Impaired judgement;Decreased safety awareness   Assessment Pt is a 88 y.o. male presented with cough and altered mental status. Pt admitted for sepsis, acute respiratory failure, and community acquired pneumonia. PT consulted for mobility assessment and safe D/C planning with orders of Up and OOB. Please see above flowsheet for PLOF and home set-up. Pt unable to provide history, information gathered from son and spouse. PTA, pt required A for ADLs/iADLs, and functional mobility w/ occasional use of RW or SPC. Upon exam, pt is S with bed mobility, S for sit to stand transfers, and min Ax1 for stand to sit transfers, and min Ax1-2 w/ RW for ambulation. Pt required increased A for stand to sit transition 2* to impulsivity and requiring verbal cues to utilize AD t/o entire transfer. Pt initially min Ax2 w/ RW for ambulation, however, was able to progress to minAx1 once out in the hallway + cues for appropriate pacing and safety. Pt greeted on 3 LO2 NC which son reports is around pt baseline. The patient's AM-PAC Basic Mobility Inpatient Short Form Raw Score is 17. A Raw score of greater than 16 suggests the patient may benefit from discharge to home. Please also refer to the recommendation of the Physical Therapist for safe discharge planning. PT recommends Level III (minimum resource intensity) upon D/C. IPPT will continue during admission at a frequency of 2-3x/wk to address current  deficits. Co-eval was necessary to complete this eval for the pts best interest given the pt's medical acuity and complexity.   Goals   Patient Goals per pts family: to go home   STG Expiration Date 07/08/24   Short Term Goal #1 Goals to be met in 10 days: 1) Pt will increase strength by 1/2 grade in order to demonstrate improvement in function and reduce fall risk 2) Pt will be I for bed mobility in order to increase independence and decrease burden on caregivers. 3) Pt will be S for all transfers in order to increase independence and decrease burden on caregivers. 4) Pt will tolerate ambulation at a distance of >150' w/ appropriate AD and S in order to increase independence and ability to traverse household distances. 5) Pt will navigate stairs with S and appropriate AD in order for pt to access home environment, increase independence, and decrease burden on caregivers. 6) Pt/caregiver education   PT Treatment Day 0   Plan   Treatment/Interventions Functional transfer training;LE strengthening/ROM;Elevations;Therapeutic exercise;Endurance training;Patient/family training;Bed mobility;Gait training;Spoke to nursing;OT;Family;Cognitive reorientation   PT Frequency 2-3x/wk   Discharge Recommendation   Rehab Resource Intensity Level, PT III (Minimum Resource Intensity)  (OPPT)   Equipment Recommended Walker  (pt has)   AM-PAC Basic Mobility Inpatient   Turning in Flat Bed Without Bedrails 3   Lying on Back to Sitting on Edge of Flat Bed Without Bedrails 3   Moving Bed to Chair 3   Standing Up From Chair Using Arms 3   Walk in Room 3   Climb 3-5 Stairs With Railing 2   Basic Mobility Inpatient Raw Score 17   Basic Mobility Standardized Score 39.67   [facility] Highest Level Of Mobility   -HL Goal 5: Stand one or more mins   -HLM Achieved 7: Walk 25 feet or more   End of Consult   Patient Position at End of Consult Supine;Bed/Chair alarm activated;All needs within reach;Other (comment)  (Restraints re-applied)    End of Consult Comments Pt in stable condition. All needs met. All lines intact. Restraints reapplied. Bed alarm activated.   Hx/personal factors: co-morbidities, advanced age, use of AD, dec cognition, fall risk, assist w/ ADL's, O2, and restraints  Examination: dec mobility, dec balance, dec endurance, dec amb, risk for falls, dec cognition  Clinical: unpredictable (ongoing medical status, abnormal lab values, and risk for falls)  Complexity: high    [place], SPT

## 2024-06-28 NOTE — NURSING NOTE
"QUICK NOTE - Stat RN  Severiano Feliciano 88 y.o. male MRN: [id number]  Unit/Bed#: [place] [address] -01 Encounter: [id number]    Date Paged: [phone]  Time Paged: [phone]  Room #: 223  Arrival Time: 0636  Deterioration index score at time of page: 45.36  %  Spoke with Temi RN from primary team  Need to escalate level of care: no     PROBLEMS resulting in high DI score:   Recalculated at 44 no longer >50    PLAN:    Continue to monitor primary RN gave hydralazine for high bp, trending down currently.    Please contact critical care via [facility] with any questions or concerns.     Vitals:   Vitals:    24 2145 24 0602 24 0639   BP: 158/89 (!) 143/101 (!) 192/104 167/92   BP Location: Left arm      Pulse: 82 78 79    Resp: 18 20 20    Temp:  97.7 °F (36.5 °C) 97.7 °F (36.5 °C)    TempSrc:  Oral Oral    SpO2: 95% 97% 93%    Weight:  55.1 kg (121 lb 7.6 oz)     Height:  5' 1\" (1.549 m)         Respiratory:  SpO2: SpO2: 93 %, SpO2 Activity: SpO2 Activity: At Rest, SpO2 Device: O2 Device: Nasal cannula  Nasal Cannula O2 Flow Rate (L/min): 3.5 L/min    Temperature: Temp (24hrs), Av.6 °F (37 °C), Min:97.7 °F (36.5 °C), Max:100.7 °F (38.2 °C)  Current: Temperature: 97.7 °F (36.5 °C)    Code Status: Level 1 - Full Code  "

## 2024-06-28 NOTE — ASSESSMENT & PLAN NOTE
Wt Readings from Last 3 Encounters:   06/27/24 55.1 kg (121 lb 7.6 oz)   05/29/24 54.4 kg (120 lb)   05/23/24 62.1 kg (136 lb 14.5 oz)     Patient is currently euvolemic  We will provide with hydration due to sepsis

## 2024-06-28 NOTE — H&P
[facility]  H&P  Name: Severiano Feliciano 88 y.o. male I MRN: [id number]  Unit/Bed#: [place] [address] -01 I Date of Admission: 6/27/2024   Date of Service: 6/27/2024 I Hospital Day: 0      Assessment & Plan   Sepsis (HCC)  Assessment & Plan  Patient presented with sepsis on admission  Source of sepsis is pneumonia due to patient was being at high risk due to recent hospitalization and chronic COPD  Patient presented with fever, lactic acidosis, acute respiratory failure, tachypnea and tachycardia  Will treat empirically with cefepime and vancomycin  Blood cultures have been sent  Will provide with hydration due to lactic acidosis    Acute respiratory failure (HCC)  Assessment & Plan  Acute on chronic respiratory  failure requiring 6 L of oxygen  Currently, on 3-1/2 L of oxygen which is his baseline    Chronic respiratory failure with hypoxia and hypercapnia (HCC)  Assessment & Plan  Chronic COPD on 3-1/2 L of oxygen  Follows up with palliative care    Chronic obstructive pulmonary disease, unspecified COPD type (HCC)  Assessment & Plan  Chronic COPD  Continue DuoNebs  Not in acute exacerbation, so does not require any steroids    Acute metabolic encephalopathy  Assessment & Plan  Currently completely resolved  Most likely due to infection    Benign essential hypertension  Assessment & Plan  Currently blood pressure is poorly controlled   We will provide with hydralazine as needed  Continue losartan 25 mg twice daily    History of DVT (deep vein thrombosis)  Assessment & Plan  History of DVT  Continue with heparin for prophylaxis    * Community acquired pneumonia  Assessment & Plan  Patient with a new cough for the past 3 days, history of recent hospitalization and COPD, chronic oxygen use.  Patient presented with fever to the emergency room  Currently doing much better and is at the baseline with oxygen  Patient with a history of MRSA  We will provide with cefepime and vancomycin due to  high risk factors for multiple infections  Reassess tomorrow  Currently hemodynamically stable       Chronic CHF-not in acute exacerbation.  He is euvolemic.  Will provide with hydration due to sepsis    VTE Pharmacologic Prophylaxis:   High Risk (Score >/= 5) - Pharmacological DVT Prophylaxis Ordered: heparin. Sequential Compression Devices Ordered.  Code Status: Level 1 - Full Code per son and wife  Discussion with family: Updated  (wife) at bedside. Son over the phone    Anticipated Length of Stay: Patient will be admitted on an inpatient basis with an anticipated length of stay of greater than 2 midnights secondary to pneumonia.    Total Time Spent on Date of Encounter in care of patient: 60 mins. This time was spent on one or more of the following: performing physical exam; counseling and coordination of care; obtaining or reviewing history; documenting in the medical record; reviewing/ordering tests, medications or procedures; communicating with other healthcare professionals and discussing with patient's family/caregivers.    Chief Complaint: cough and AMS    History of Present Illness:  Severiano Feliciano is a 88 y.o. male with a PMH of COPD, chronic CHF, chronic respiratory failure, hypertension, who presents with cough and altered mental state.  Patient is a [place] speaker, so his wife at the bedside translates for me and his son over the phone also helps with the interview.  Per son, patient started coughing and feeling sick in the past 3 days.  Patient's family called his PCP and he was started on antibiotic and prednisone, but later in the day he developed fever and became confused and agitated.  Currently, patient feels much better and is eating his dinner.  He denies any shortness of breath or cough at this time.      Review of Systems:  Review of Systems   Constitutional:  Positive for activity change and fever. Negative for diaphoresis and fatigue.   Respiratory:  Positive for cough.  Negative for shortness of breath.    Cardiovascular:  Negative for chest pain and leg swelling.   Gastrointestinal:  Negative for abdominal pain, nausea and vomiting.   Genitourinary:  Negative for frequency.   Musculoskeletal:  Negative for back pain.   Neurological:  Negative for dizziness.   Psychiatric/Behavioral:  Positive for confusion. Negative for behavioral problems.        Past Medical and Surgical History:   Past Medical History:   Diagnosis Date    Acute metabolic encephalopathy 06/13/2020    Age-related cataract of right eye 04/04/2023    patient is medically cleared and presents with low risk of complication with this upcoming R cataract surgery.    Anemia     Aneurysm, aorta, thoracic (HCC)     Appendicolith     Ascending aortic aneurysm (HCC)     3.7    Asthma     BPH (benign prostatic hyperplasia)     CAD (coronary artery disease)     noted on CT scan    CHF (congestive heart failure) (HCC)     COPD (chronic obstructive pulmonary disease) (HCC)     Descending thoracic aortic aneurysm (HCC)     Diabetes mellitus (HCC)     Diverticulosis     Former tobacco use     GERD (gastroesophageal reflux disease)     History of DVT (deep vein thrombosis)     Left leg    History of transfusion     Hypertension     Hypoxemia 04/30/2023    Inguinal hernia     right    Nephrolithiasis     Oxygen dependent     2LNC    Oxygen dependent     Pneumonia     Pre-diabetes     Prostate calculus     PVD (peripheral vascular disease) (HCC)     Recurrent right inguinal hernia w incarcertion 01/04/2023    Thoracic aortic aneurysm without rupture (HCC) 11/19/2018    Added automatically from request for surgery [id number]    Thrombocytopenia (HCC) 12/29/2018    Ulcer     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)     Varicose vein of leg     b/l       Past Surgical History:   Procedure Laterality Date    CARDIAC SURGERY      ESOPHAGOGASTRODUODENOSCOPY      HERNIA REPAIR Right 1/21/2019    Procedure: REPAIR HERNIA  INGUINAL WITH MESH;  Surgeon: David Lowry MD;  Location:  MAIN OR;  Service: General    HERNIA REPAIR Right 7/22/2023    Procedure: REPAIR RECURRENT INCARERATED HERNIA INGUINAL OPEN, RIGHT ORCHIECTOMY;  Surgeon: Mason Bertrand MD;  Location: [facility];  Service: General    INGUINAL HERNIA REPAIR Bilateral     IR TEVAR  12/27/2018    PA EVASC RPR DTA COVERAGE ART ORIGIN 1ST ENDOPROSTH N/A 12/27/2018    Procedure: TEVAR - endovascular thoracic aortic aneurysm repair;  Surgeon: Gladis Ortega MD;  Location: [facility];  Service: Vascular    THORACIC AORTIC ANEURYSM REPAIR  12/27/2018    VARICOSE VEIN SURGERY Bilateral     vein stripping       Meds/Allergies:  Prior to Admission medications    Medication Sig Start Date End Date Taking? Authorizing Provider   acetaminophen (TYLENOL) 650 mg CR tablet Take 1 tablet (650 mg total) by mouth every 8 (eight) hours as needed for mild pain 3/25/24  Yes Kirby Casanova MD   albuterol (PROVENTIL HFA,VENTOLIN HFA) 90 mcg/act inhaler INHALE 2 PUFFS EVERY 6 (SIX) HOURS AS NEEDED FOR WHEEZING 6/21/24  Yes Kirby Casanova MD   azithromycin (Zithromax) 250 mg tablet Take 2 tablets (500 mg total) by mouth daily for 1 day, THEN 1 tablet (250 mg total) daily for 4 days. 6/27/24 7/2/24 Yes Kirby Casanova MD   budesonide (Pulmicort) 0.5 mg/2 mL nebulizer solution Take 2 mL (0.5 mg total) by nebulization 2 (two) times a day Rinse mouth after use. 5/3/24 4/28/25 Yes Buddy Hayward MD   formoterol (PERFOROMIST) 20 MCG/2ML nebulizer solution Take 2 mL (20 mcg total) by nebulization 2 (two) times a day 5/3/24 4/28/25 Yes Buddy Hayward MD   ipratropium-albuterol (DUO-NEB) 0.5-2.5 mg/3 mL nebulizer solution Take 3 mL by nebulization 2 (two) times a day 5/3/24 4/28/25 Yes Buddy Hayward MD   losartan (COZAAR) 25 mg tablet TAKE ONE TABLET BY MOUTH TWICE DAILYTOMAR 1 TABLETA POR VIA ORAL DOS VECES AL EULALIO 3/6/24  Yes Jessie Menendez PA-C   mometasone (ELOCON) 0.1 % lotion Apply  "topically daily 24  Yes Kirby Casanova MD   pantoprazole (PROTONIX) 40 mg tablet TAKE 1 TABLET (40 MG TOTAL) BY MOUTH DAILY 24  Yes Kirby Casanova MD   predniSONE 20 mg tablet Take 1 tablet (20 mg total) by mouth daily for 15 days 24 Yes Kirby Casanova MD   senna-docusate sodium (SENOKOT S) 8.6-50 mg per tablet Take 1 tablet by mouth daily 3/25/24  Yes Kirby Casanova MD   brimonidine tartrate 0.2 % ophthalmic solution INSTILL 1 DROP IN LEFT EYE TWICE DAILY INSTILL 1 DROP IN LEFT EYE DOS VECES AL EULALIO  Patient not taking: Reported on 23   Historical Provider, MD   predniSONE 2.5 mg tablet Take 1 tablet (2.5 mg total) by mouth daily 24  Kirby Casanova MD     I have reviewed home medications using recent Epic encounter.    Allergies:   Allergies   Allergen Reactions    Penicillins Hives, Itching and Rash    Lisinopril Rash     Side pains and rash     Zyprexa [Olanzapine] Tongue Swelling       Social History:  Marital Status: /Civil Union   Occupation: Retired  Patient Pre-hospital Living Situation: Apartment  Patient Pre-hospital Level of Mobility: walks with walker  Patient Pre-hospital Diet Restrictions: None  Substance Use History:   Social History     Substance and Sexual Activity   Alcohol Use Never     Social History     Tobacco Use   Smoking Status Former    Current packs/day: 0.00    Average packs/day: 1 pack/day for 35.0 years (35.0 ttl pk-yrs)    Types: Cigarettes    Start date:     Quit date:     Years since quittin.5   Smokeless Tobacco Never     Social History     Substance and Sexual Activity   Drug Use No       Family History:  Noncontributory    Physical Exam:     Vitals:   Blood Pressure: (!) 143/101 (24)  Pulse: 78 (24)  Temperature: 97.7 °F (36.5 °C) (24)  Temp Source: Oral (24)  Respirations: 20 (24)  Height: 5' 1\" (154.9 cm) (24)  Weight - Scale: 55.1 kg (121 " lb 7.6 oz) (06/27/24 2145)  SpO2: 97 % (06/27/24 2145)    Physical Exam  Vitals and nursing note reviewed.   Constitutional:       General: He is not in acute distress.     Appearance: He is well-developed. He is not ill-appearing.   HENT:      Head: Normocephalic and atraumatic.   Cardiovascular:      Rate and Rhythm: Normal rate and regular rhythm.      Heart sounds: No murmur heard.  Pulmonary:      Effort: Pulmonary effort is normal. No respiratory distress.      Breath sounds: Examination of the right-middle field reveals decreased breath sounds. Examination of the right-lower field reveals decreased breath sounds. Decreased breath sounds present. No wheezing.   Abdominal:      Palpations: Abdomen is soft.      Tenderness: There is no abdominal tenderness.   Musculoskeletal:         General: No swelling.      Cervical back: Neck supple.   Skin:     General: Skin is warm and dry.   Neurological:      Mental Status: He is alert.   Psychiatric:         Mood and Affect: Mood normal.          Additional Data:     Lab Results:  Results from last 7 days   Lab Units 06/27/24  1624   WBC Thousand/uL 9.15   HEMOGLOBIN g/dL 10.6*   HEMATOCRIT % 37.2   PLATELETS Thousands/uL 133*   SEGS PCT % 81*   LYMPHO PCT % 8*   MONO PCT % 10   EOS PCT % 1     Results from last 7 days   Lab Units 06/27/24  1624   SODIUM mmol/L 143   POTASSIUM mmol/L 4.8   CHLORIDE mmol/L 92*   CO2 mmol/L >45*   BUN mg/dL 21   CREATININE mg/dL 0.80   CALCIUM mg/dL 10.5*   ALBUMIN g/dL 3.8   TOTAL BILIRUBIN mg/dL 0.70   ALK PHOS U/L 64   ALT U/L 25   AST U/L 31   GLUCOSE RANDOM mg/dL 140         Results from last 7 days   Lab Units 06/27/24  1741   POC GLUCOSE mg/dl 118     Lab Results   Component Value Date    HGBA1C 6.2 12/09/2022    HGBA1C 6.2 (H) 06/13/2020    HGBA1C 6.3 (H) 06/12/2020     Results from last 7 days   Lab Units 06/27/24  1905 06/27/24  1624   LACTIC ACID mmol/L 0.8 2.3*       Lines/Drains:  Invasive Devices       Peripheral Intravenous  Line  Duration             Peripheral IV 06/27/24 Right;Ventral (anterior) Forearm <1 day                        Imaging: Reviewed radiology reports from this admission including: chest xray  XR chest 1 view portable    (Results Pending)       EKG and Other Studies Reviewed on Admission:   EKG: NSR. HR 90.    ** Please Note: This note has been constructed using a voice recognition system. **

## 2024-06-28 NOTE — PLAN OF CARE
Problem: Potential for Falls  Goal: Patient will remain free of falls  Description: INTERVENTIONS:  - Educate patient/family on patient safety including physical limitations  - Instruct patient to call for assistance with activity   - Consult OT/PT to assist with strengthening/mobility   - Keep Call bell within reach  - Keep bed low and locked with side rails adjusted as appropriate  - Keep care items and personal belongings within reach  - Initiate and maintain comfort rounds  - Make Fall Risk Sign visible to staff  - Offer Toileting   - Initiate/Maintain alarm  - Obtain necessary fall risk management equipment  - Apply yellow socks and bracelet for high fall risk patients  - Consider moving patient to room near nurses station  Outcome: Progressing     Problem: Prexisting or High Potential for Compromised Skin Integrity  Goal: Skin integrity is maintained or improved  Description: INTERVENTIONS:  - Identify patients at risk for skin breakdown  - Assess and monitor skin integrity  - Assess and monitor nutrition and hydration status  - Monitor labs   - Assess for incontinence   - Turn and reposition patient  - Assist with mobility/ambulation  - Relieve pressure over bony prominences  - Avoid friction and shearing  - Provide appropriate hygiene as needed including keeping skin clean and dry  - Evaluate need for skin moisturizer/barrier cream  - Collaborate with interdisciplinary team   - Patient/family teaching  - Consider wound care consult   Outcome: Progressing     Problem: PAIN - ADULT  Goal: Verbalizes/displays adequate comfort level or baseline comfort level  Description: Interventions:  - Encourage patient to monitor pain and request assistance  - Assess pain using appropriate pain scale  - Administer analgesics based on type and severity of pain and evaluate response  - Implement non-pharmacological measures as appropriate and evaluate response  - Consider cultural and social influences on pain and pain  management  - Notify physician/advanced practitioner if interventions unsuccessful or patient reports new pain  Outcome: Progressing     Problem: INFECTION - ADULT  Goal: Absence or prevention of progression during hospitalization  Description: INTERVENTIONS:  - Assess and monitor for signs and symptoms of infection  - Monitor lab/diagnostic results  - Monitor all insertion sites, i.e. indwelling lines, tubes, and drains  - Monitor endotracheal if appropriate and nasal secretions for changes in amount and color  - Bridger appropriate cooling/warming therapies per order  - Administer medications as ordered  - Instruct and encourage patient and family to use good hand hygiene technique  - Identify and instruct in appropriate isolation precautions for identified infection/condition  Outcome: Progressing  Goal: Absence of fever/infection during neutropenic period  Description: INTERVENTIONS:  - Monitor WBC    Outcome: Progressing     Problem: SAFETY ADULT  Goal: Patient will remain free of falls  Description: INTERVENTIONS:  - Educate patient/family on patient safety including physical limitations  - Instruct patient to call for assistance with activity   - Consult OT/PT to assist with strengthening/mobility   - Keep Call bell within reach  - Keep bed low and locked with side rails adjusted as appropriate  - Keep care items and personal belongings within reach  - Initiate and maintain comfort rounds  - Make Fall Risk Sign visible to staff  - Offer Toileting every   - Initiate/Maintain alarm  - Obtain necessary fall risk management equipment  - Apply yellow socks and bracelet for high fall risk patients  - Consider moving patient to room near nurses station  Outcome: Progressing  Goal: Maintain or return to baseline ADL function  Description: INTERVENTIONS:  -  Assess patient's ability to carry out ADLs; assess patient's baseline for ADL function and identify physical deficits which impact ability to perform ADLs  (bathing, care of mouth/teeth, toileting, grooming, dressing, etc.)  - Assess/evaluate cause of self-care deficits   - Assess range of motion  - Assess patient's mobility; develop plan if impaired  - Assess patient's need for assistive devices and provide as appropriate  - Encourage maximum independence but intervene and supervise when necessary  - Involve family in performance of ADLs  - Assess for home care needs following discharge   - Consider OT consult to assist with ADL evaluation and planning for discharge  - Provide patient education as appropriate  Outcome: Progressing  Goal: Maintains/Returns to pre admission functional level  Description: INTERVENTIONS:  - Perform AM-PAC 6 Click Basic Mobility/ Daily Activity assessment daily.  - Set and communicate daily mobility goal to care team and patient/family/caregiver.   - Collaborate with rehabilitation services on mobility goals if consulted  - Perform Range of Motion   - Reposition patient   - Dangle patient   - Stand patient  - Ambulate patient   - Out of bed to chair   - Out of bed for meals  - Out of bed for toileting  - Record patient progress and toleration of activity level   Outcome: Progressing     Problem: DISCHARGE PLANNING  Goal: Discharge to home or other facility with appropriate resources  Description: INTERVENTIONS:  - Identify barriers to discharge w/patient and caregiver  - Arrange for needed discharge resources and transportation as appropriate  - Identify discharge learning needs (meds, wound care, etc.)  - Arrange for interpretive services to assist at discharge as needed  - Refer to Case Management Department for coordinating discharge planning if the patient needs post-hospital services based on physician/advanced practitioner order or complex needs related to functional status, cognitive ability, or social support system  Outcome: Progressing     Problem: Knowledge Deficit  Goal: Patient/family/caregiver demonstrates understanding of  disease process, treatment plan, medications, and discharge instructions  Description: Complete learning assessment and assess knowledge base.  Interventions:  - Provide teaching at level of understanding  - Provide teaching via preferred learning methods  Outcome: Progressing     Problem: CARDIOVASCULAR - ADULT  Goal: Absence of cardiac dysrhythmias or at baseline rhythm  Description: INTERVENTIONS:  - Continuous cardiac monitoring, vital signs, obtain 12 lead EKG if ordered  - Administer antiarrhythmic and heart rate control medications as ordered  - Monitor electrolytes and administer replacement therapy as ordered  Outcome: Progressing     Problem: RESPIRATORY - ADULT  Goal: Achieves optimal ventilation and oxygenation  Description: INTERVENTIONS:  - Assess for changes in respiratory status  - Assess for changes in mentation and behavior  - Position to facilitate oxygenation and minimize respiratory effort  - Oxygen administered by appropriate delivery if ordered  - Initiate smoking cessation education as indicated  - Encourage broncho-pulmonary hygiene including cough, deep breathe, Incentive Spirometry  - Assess the need for suctioning and aspirate as needed  - Assess and instruct to report SOB or any respiratory difficulty  - Respiratory Therapy support as indicated  Outcome: Progressing

## 2024-06-29 PROBLEM — J06.9 VIRAL URI WITH COUGH: Status: ACTIVE | Noted: 2018-07-18

## 2024-06-29 LAB
BUN SERPL-MCNC: 18 MG/DL (ref 5–25)
CALCIUM SERPL-MCNC: 9.6 MG/DL (ref 8.4–10.2)
CHLORIDE SERPL-SCNC: 96 MMOL/L (ref 96–108)
CO2 SERPL-SCNC: >45 MMOL/L (ref 21–32)
CREAT SERPL-MCNC: 0.8 MG/DL (ref 0.6–1.3)
ERYTHROCYTE [DISTWIDTH] IN BLOOD BY AUTOMATED COUNT: 12.9 % (ref 11.6–15.1)
GFR SERPL CREATININE-BSD FRML MDRD: 80 ML/MIN/1.73SQ M
GLUCOSE SERPL-MCNC: 123 MG/DL (ref 65–140)
HCT VFR BLD AUTO: 35.6 % (ref 36.5–49.3)
HGB BLD-MCNC: 10.6 G/DL (ref 12–17)
MCH RBC QN AUTO: 31.2 PG (ref 26.8–34.3)
MCHC RBC AUTO-ENTMCNC: 29.8 G/DL (ref 31.4–37.4)
MCV RBC AUTO: 105 FL (ref 82–98)
PLATELET # BLD AUTO: 111 THOUSANDS/UL (ref 149–390)
PMV BLD AUTO: 9.3 FL (ref 8.9–12.7)
POTASSIUM SERPL-SCNC: 3.9 MMOL/L (ref 3.5–5.3)
PROCALCITONIN SERPL-MCNC: 0.1 NG/ML
RBC # BLD AUTO: 3.4 MILLION/UL (ref 3.88–5.62)
SODIUM SERPL-SCNC: 143 MMOL/L (ref 135–147)
VANCOMYCIN SERPL-MCNC: 18.6 UG/ML (ref 10–20)
WBC # BLD AUTO: 6.66 THOUSAND/UL (ref 4.31–10.16)

## 2024-06-29 PROCEDURE — 87081 CULTURE SCREEN ONLY: CPT | Performed by: FAMILY MEDICINE

## 2024-06-29 PROCEDURE — 94664 DEMO&/EVAL PT USE INHALER: CPT

## 2024-06-29 PROCEDURE — 94002 VENT MGMT INPAT INIT DAY: CPT

## 2024-06-29 PROCEDURE — 94760 N-INVAS EAR/PLS OXIMETRY 1: CPT

## 2024-06-29 PROCEDURE — 80048 BASIC METABOLIC PNL TOTAL CA: CPT

## 2024-06-29 PROCEDURE — 85027 COMPLETE CBC AUTOMATED: CPT

## 2024-06-29 PROCEDURE — 99232 SBSQ HOSP IP/OBS MODERATE 35: CPT

## 2024-06-29 PROCEDURE — 84145 PROCALCITONIN (PCT): CPT

## 2024-06-29 PROCEDURE — 94640 AIRWAY INHALATION TREATMENT: CPT

## 2024-06-29 PROCEDURE — 80202 ASSAY OF VANCOMYCIN: CPT | Performed by: FAMILY MEDICINE

## 2024-06-29 RX ORDER — TRIAMCINOLONE ACETONIDE 1 MG/G
CREAM TOPICAL DAILY
Status: DISCONTINUED | OUTPATIENT
Start: 2024-06-29 | End: 2024-06-30 | Stop reason: HOSPADM

## 2024-06-29 RX ORDER — POLYETHYLENE GLYCOL 3350 17 G/17G
17 POWDER, FOR SOLUTION ORAL DAILY
Status: DISCONTINUED | OUTPATIENT
Start: 2024-06-29 | End: 2024-06-30 | Stop reason: HOSPADM

## 2024-06-29 RX ADMIN — CEFEPIME 2000 MG: 2 INJECTION, POWDER, FOR SOLUTION INTRAVENOUS at 04:31

## 2024-06-29 RX ADMIN — IPRATROPIUM BROMIDE AND ALBUTEROL SULFATE 3 ML: 2.5; .5 SOLUTION RESPIRATORY (INHALATION) at 09:11

## 2024-06-29 RX ADMIN — LOSARTAN POTASSIUM 25 MG: 25 TABLET, FILM COATED ORAL at 09:10

## 2024-06-29 RX ADMIN — BUDESONIDE 0.5 MG: 0.5 INHALANT RESPIRATORY (INHALATION) at 09:11

## 2024-06-29 RX ADMIN — FORMOTEROL FUMARATE 20 MCG: 20 SOLUTION RESPIRATORY (INHALATION) at 19:42

## 2024-06-29 RX ADMIN — HEPARIN SODIUM 5000 UNITS: 5000 INJECTION INTRAVENOUS; SUBCUTANEOUS at 22:02

## 2024-06-29 RX ADMIN — LOSARTAN POTASSIUM 25 MG: 25 TABLET, FILM COATED ORAL at 22:02

## 2024-06-29 RX ADMIN — IPRATROPIUM BROMIDE AND ALBUTEROL SULFATE 3 ML: 2.5; .5 SOLUTION RESPIRATORY (INHALATION) at 19:42

## 2024-06-29 RX ADMIN — HEPARIN SODIUM 5000 UNITS: 5000 INJECTION INTRAVENOUS; SUBCUTANEOUS at 06:40

## 2024-06-29 RX ADMIN — BUDESONIDE 0.5 MG: 0.5 INHALANT RESPIRATORY (INHALATION) at 19:42

## 2024-06-29 RX ADMIN — MELATONIN 3 MG: 3 TAB ORAL at 22:02

## 2024-06-29 RX ADMIN — HEPARIN SODIUM 5000 UNITS: 5000 INJECTION INTRAVENOUS; SUBCUTANEOUS at 15:11

## 2024-06-29 RX ADMIN — POLYETHYLENE GLYCOL 3350 17 G: 17 POWDER, FOR SOLUTION ORAL at 12:34

## 2024-06-29 RX ADMIN — TRIAMCINOLONE ACETONIDE: 1 CREAM TOPICAL at 12:34

## 2024-06-29 RX ADMIN — FORMOTEROL FUMARATE 20 MCG: 20 SOLUTION RESPIRATORY (INHALATION) at 09:11

## 2024-06-29 RX ADMIN — SENNOSIDES AND DOCUSATE SODIUM 1 TABLET: 50; 8.6 TABLET ORAL at 09:10

## 2024-06-29 RX ADMIN — PANTOPRAZOLE SODIUM 40 MG: 40 TABLET, DELAYED RELEASE ORAL at 06:40

## 2024-06-29 NOTE — ASSESSMENT & PLAN NOTE
Patient with a new cough for the past 3 days, history of recent hospitalization and COPD, chronic oxygen use.  Patient presented with fever to the emergency room  Likely viral URI  Currently doing much better and is at the baseline with oxygen  Patient with a history of MRSA  Started cefepime and vancomycin due to high risk factors for multiple infections discontinue antibiotics   Procal negative x2   Chest x ray showing oplatelike opacities at the right greater than left base likely representing combination of chronic scarring and atelectasis unless there is compelling clinical evidence for superimposed pneumonia.

## 2024-06-29 NOTE — ASSESSMENT & PLAN NOTE
Patient presented with sepsis on admission  Source of sepsis is viral uri initial concern for bacterial pneumonia   Patient presented with fever, lactic acidosis, acute respiratory failure, tachypnea and tachycardia  started empirically with cefepime and vancomycin, d/c procal negative x 2  Blood cultures negative   S/p iv hydration

## 2024-06-29 NOTE — PLAN OF CARE
Problem: Potential for Falls  Goal: Patient will remain free of falls  Description: INTERVENTIONS:  - Educate patient/family on patient safety including physical limitations  - Instruct patient to call for assistance with activity   - Consult OT/PT to assist with strengthening/mobility   - Keep Call bell within reach  - Keep bed low and locked with side rails adjusted as appropriate  - Keep care items and personal belongings within reach  - Initiate and maintain comfort rounds  - Make Fall Risk Sign visible to staff  - Offer Toileting every 2 Hours, in advance of need  - Initiate/Maintain bed alarm  - Obtain necessary fall risk management equipment:   - Apply yellow socks and bracelet for high fall risk patients  - Consider moving patient to room near nurses station  Outcome: Progressing     Problem: Prexisting or High Potential for Compromised Skin Integrity  Goal: Skin integrity is maintained or improved  Description: INTERVENTIONS:  - Identify patients at risk for skin breakdown  - Assess and monitor skin integrity  - Assess and monitor nutrition and hydration status  - Monitor labs   - Assess for incontinence   - Turn and reposition patient  - Assist with mobility/ambulation  - Relieve pressure over bony prominences  - Avoid friction and shearing  - Provide appropriate hygiene as needed including keeping skin clean and dry  - Evaluate need for skin moisturizer/barrier cream  - Collaborate with interdisciplinary team   - Patient/family teaching  - Consider wound care consult   Outcome: Progressing     Problem: PAIN - ADULT  Goal: Verbalizes/displays adequate comfort level or baseline comfort level  Description: Interventions:  - Encourage patient to monitor pain and request assistance  - Assess pain using appropriate pain scale  - Administer analgesics based on type and severity of pain and evaluate response  - Implement non-pharmacological measures as appropriate and evaluate response  - Consider cultural and  social influences on pain and pain management  - Notify physician/advanced practitioner if interventions unsuccessful or patient reports new pain  Outcome: Progressing     Problem: INFECTION - ADULT  Goal: Absence or prevention of progression during hospitalization  Description: INTERVENTIONS:  - Assess and monitor for signs and symptoms of infection  - Monitor lab/diagnostic results  - Monitor all insertion sites, i.e. indwelling lines, tubes, and drains  - Monitor endotracheal if appropriate and nasal secretions for changes in amount and color  - [place] appropriate cooling/warming therapies per order  - Administer medications as ordered  - Instruct and encourage patient and family to use good hand hygiene technique  - Identify and instruct in appropriate isolation precautions for identified infection/condition  Outcome: Progressing  Goal: Absence of fever/infection during neutropenic period  Description: INTERVENTIONS:  - Monitor WBC    Outcome: Progressing     Problem: SAFETY ADULT  Goal: Patient will remain free of falls  Description: INTERVENTIONS:  - Educate patient/family on patient safety including physical limitations  - Instruct patient to call for assistance with activity   - Consult OT/PT to assist with strengthening/mobility   - Keep Call bell within reach  - Keep bed low and locked with side rails adjusted as appropriate  - Keep care items and personal belongings within reach  - Initiate and maintain comfort rounds  - Make Fall Risk Sign visible to staff  - Offer Toileting every 2  Hours, in advance of need  - Initiate/Maintain bed alarm  - Obtain necessary fall risk management equipment:   - Apply yellow socks and bracelet for high fall risk patients  - Consider moving patient to room near nurses station  Outcome: Progressing  Goal: Maintain or return to baseline ADL function  Description: INTERVENTIONS:  -  Assess patient's ability to carry out ADLs; assess patient's baseline for ADL function and  identify physical deficits which impact ability to perform ADLs (bathing, care of mouth/teeth, toileting, grooming, dressing, etc.)  - Assess/evaluate cause of self-care deficits   - Assess range of motion  - Assess patient's mobility; develop plan if impaired  - Assess patient's need for assistive devices and provide as appropriate  - Encourage maximum independence but intervene and supervise when necessary  - Involve family in performance of ADLs  - Assess for home care needs following discharge   - Consider OT consult to assist with ADL evaluation and planning for discharge  - Provide patient education as appropriate  Outcome: Progressing  Goal: Maintains/Returns to pre admission functional level  Description: INTERVENTIONS:  - Perform AM-PAC 6 Click Basic Mobility/ Daily Activity assessment daily.  - Set and communicate daily mobility goal to care team and patient/family/caregiver.   - Collaborate with rehabilitation services on mobility goals if consulted  - Perform Range of Motion 3 times a day.  - Reposition patient every 2 hours.  - Dangle patient 3 times a day  - Stand patient 3 times a day  - Ambulate patient 3 times a day  - Out of bed to chair 3 times a day   - Out of bed for meals 3 times a day  - Out of bed for toileting  - Record patient progress and toleration of activity level   Outcome: Progressing     Problem: DISCHARGE PLANNING  Goal: Discharge to home or other facility with appropriate resources  Description: INTERVENTIONS:  - Identify barriers to discharge w/patient and caregiver  - Arrange for needed discharge resources and transportation as appropriate  - Identify discharge learning needs (meds, wound care, etc.)  - Arrange for interpretive services to assist at discharge as needed  - Refer to Case Management Department for coordinating discharge planning if the patient needs post-hospital services based on physician/advanced practitioner order or complex needs related to functional status,  cognitive ability, or social support system  Outcome: Progressing     Problem: Knowledge Deficit  Goal: Patient/family/caregiver demonstrates understanding of disease process, treatment plan, medications, and discharge instructions  Description: Complete learning assessment and assess knowledge base.  Interventions:  - Provide teaching at level of understanding  - Provide teaching via preferred learning methods  Outcome: Progressing     Problem: CARDIOVASCULAR - ADULT  Goal: Absence of cardiac dysrhythmias or at baseline rhythm  Description: INTERVENTIONS:  - Continuous cardiac monitoring, vital signs, obtain 12 lead EKG if ordered  - Administer antiarrhythmic and heart rate control medications as ordered  - Monitor electrolytes and administer replacement therapy as ordered  Outcome: Progressing     Problem: RESPIRATORY - ADULT  Goal: Achieves optimal ventilation and oxygenation  Description: INTERVENTIONS:  - Assess for changes in respiratory status  - Assess for changes in mentation and behavior  - Position to facilitate oxygenation and minimize respiratory effort  - Oxygen administered by appropriate delivery if ordered  - Initiate smoking cessation education as indicated  - Encourage broncho-pulmonary hygiene including cough, deep breathe, Incentive Spirometry  - Assess the need for suctioning and aspirate as needed  - Assess and instruct to report SOB or any respiratory difficulty  - Respiratory Therapy support as indicated  Outcome: Progressing     Problem: SAFETY,RESTRAINT: NV/NON-SELF DESTRUCTIVE BEHAVIOR  Goal: Remains free of harm/injury (restraint for non violent/non self-detsructive behavior)  Description: INTERVENTIONS:  - Instruct patient/family regarding restraint use   - Assess and monitor physiologic and psychological status   - Provide interventions and comfort measures to meet assessed patient needs   - Identify and implement measures to help patient regain control  - Assess readiness for release  of restraint   Outcome: Progressing  Goal: Returns to optimal restraint-free functioning  Description: INTERVENTIONS:  - Assess the patient's behavior and symptoms that indicate continued need for restraint  - Identify and implement measures to help patient regain control  - Assess readiness for release of restraint   Outcome: Progressing

## 2024-06-29 NOTE — ASSESSMENT & PLAN NOTE
Acute on chronic respiratory  failure initally requiring 6 L of oxygen  Currently, on 3-1/2 L of oxygen which is his baseline

## 2024-06-29 NOTE — UTILIZATION REVIEW
Continued Stay Review    SEE INITIAL REVIEW AT BOTTOM      Date: 06/29                          Current Patient Class: IP  Current Level of Care: MS    HPI:88 y.o. male initially admitted on 06/27     Assessment/Plan:   Date: 06/29  Day 3: Has surpassed a 2nd midnight with active treatments and services.  Pt's breathing improved today, back to his baseline O2 at 3L NC, still w/ cough. Procal neg x 2. Bld cx neg. Likely viral URI.  IV abx dc'd. His wife is concerned for him being constipated. Cont senna, started on Miralax. Cont nebs.   PE: diminished breath sounds.

## 2024-06-29 NOTE — PLAN OF CARE
Problem: Potential for Falls  Goal: Patient will remain free of falls  Description: INTERVENTIONS:  - Educate patient/family on patient safety including physical limitations  - Instruct patient to call for assistance with activity   - Consult OT/PT to assist with strengthening/mobility   - Keep Call bell within reach  - Keep bed low and locked with side rails adjusted as appropriate  - Keep care items and personal belongings within reach  - Initiate and maintain comfort rounds  - Make Fall Risk Sign visible to staff  - Offer Toileting every 2 Hours, in advance of need  - Initiate/Maintain alarm  - Obtain necessary fall risk management equipment  - Apply yellow socks and bracelet for high fall risk patients  - Consider moving patient to room near nurses station  Outcome: Progressing     Problem: Prexisting or High Potential for Compromised Skin Integrity  Goal: Skin integrity is maintained or improved  Description: INTERVENTIONS:  - Identify patients at risk for skin breakdown  - Assess and monitor skin integrity  - Assess and monitor nutrition and hydration status  - Monitor labs   - Assess for incontinence   - Turn and reposition patient  - Assist with mobility/ambulation  - Relieve pressure over bony prominences  - Avoid friction and shearing  - Provide appropriate hygiene as needed including keeping skin clean and dry  - Evaluate need for skin moisturizer/barrier cream  - Collaborate with interdisciplinary team   - Patient/family teaching  - Consider wound care consult   Outcome: Progressing     Problem: PAIN - ADULT  Goal: Verbalizes/displays adequate comfort level or baseline comfort level  Description: Interventions:  - Encourage patient to monitor pain and request assistance  - Assess pain using appropriate pain scale  - Administer analgesics based on type and severity of pain and evaluate response  - Implement non-pharmacological measures as appropriate and evaluate response  - Consider cultural and social  influences on pain and pain management  - Notify physician/advanced practitioner if interventions unsuccessful or patient reports new pain  Outcome: Progressing     Problem: INFECTION - ADULT  Goal: Absence or prevention of progression during hospitalization  Description: INTERVENTIONS:  - Assess and monitor for signs and symptoms of infection  - Monitor lab/diagnostic results  - Monitor all insertion sites, i.e. indwelling lines, tubes, and drains  - Monitor endotracheal if appropriate and nasal secretions for changes in amount and color  - [place] appropriate cooling/warming therapies per order  - Administer medications as ordered  - Instruct and encourage patient and family to use good hand hygiene technique  - Identify and instruct in appropriate isolation precautions for identified infection/condition  Outcome: Progressing  Goal: Absence of fever/infection during neutropenic period  Description: INTERVENTIONS:  - Monitor WBC    Outcome: Progressing     Problem: SAFETY ADULT  Goal: Patient will remain free of falls  Description: INTERVENTIONS:  - Educate patient/family on patient safety including physical limitations  - Instruct patient to call for assistance with activity   - Consult OT/PT to assist with strengthening/mobility   - Keep Call bell within reach  - Keep bed low and locked with side rails adjusted as appropriate  - Keep care items and personal belongings within reach  - Initiate and maintain comfort rounds  - Make Fall Risk Sign visible to staff  - Offer Toileting every 2 Hours, in advance of need  - Initiate/Maintain alarm  - Obtain necessary fall risk management equipment  - Apply yellow socks and bracelet for high fall risk patients  - Consider moving patient to room near nurses station  Outcome: Progressing  Goal: Maintain or return to baseline ADL function  Description: INTERVENTIONS:  -  Assess patient's ability to carry out ADLs; assess patient's baseline for ADL function and identify  physical deficits which impact ability to perform ADLs (bathing, care of mouth/teeth, toileting, grooming, dressing, etc.)  - Assess/evaluate cause of self-care deficits   - Assess range of motion  - Assess patient's mobility; develop plan if impaired  - Assess patient's need for assistive devices and provide as appropriate  - Encourage maximum independence but intervene and supervise when necessary  - Involve family in performance of ADLs  - Assess for home care needs following discharge   - Consider OT consult to assist with ADL evaluation and planning for discharge  - Provide patient education as appropriate  Outcome: Progressing  Goal: Maintains/Returns to pre admission functional level  Description: INTERVENTIONS:  - Perform AM-PAC 6 Click Basic Mobility/ Daily Activity assessment daily.  - Set and communicate daily mobility goal to care team and patient/family/caregiver.   - Collaborate with rehabilitation services on mobility goals if consulted  - Perform Range of Motion 3 times a day.  - Reposition patient every 2 hours.  - Dangle patient 3 times a day  - Stand patient 3 times a day  - Ambulate patient 3 times a day  - Out of bed to chair 3 times a day   - Out of bed for meals 3 times a day  - Out of bed for toileting  - Record patient progress and toleration of activity level   Outcome: Progressing     Problem: DISCHARGE PLANNING  Goal: Discharge to home or other facility with appropriate resources  Description: INTERVENTIONS:  - Identify barriers to discharge w/patient and caregiver  - Arrange for needed discharge resources and transportation as appropriate  - Identify discharge learning needs (meds, wound care, etc.)  - Arrange for interpretive services to assist at discharge as needed  - Refer to Case Management Department for coordinating discharge planning if the patient needs post-hospital services based on physician/advanced practitioner order or complex needs related to functional status, cognitive  ability, or social support system  Outcome: Progressing     Problem: Knowledge Deficit  Goal: Patient/family/caregiver demonstrates understanding of disease process, treatment plan, medications, and discharge instructions  Description: Complete learning assessment and assess knowledge base.  Interventions:  - Provide teaching at level of understanding  - Provide teaching via preferred learning methods  Outcome: Progressing     Problem: CARDIOVASCULAR - ADULT  Goal: Absence of cardiac dysrhythmias or at baseline rhythm  Description: INTERVENTIONS:  - Continuous cardiac monitoring, vital signs, obtain 12 lead EKG if ordered  - Administer antiarrhythmic and heart rate control medications as ordered  - Monitor electrolytes and administer replacement therapy as ordered  Outcome: Progressing     Problem: RESPIRATORY - ADULT  Goal: Achieves optimal ventilation and oxygenation  Description: INTERVENTIONS:  - Assess for changes in respiratory status  - Assess for changes in mentation and behavior  - Position to facilitate oxygenation and minimize respiratory effort  - Oxygen administered by appropriate delivery if ordered  - Initiate smoking cessation education as indicated  - Encourage broncho-pulmonary hygiene including cough, deep breathe, Incentive Spirometry  - Assess the need for suctioning and aspirate as needed  - Assess and instruct to report SOB or any respiratory difficulty  - Respiratory Therapy support as indicated  Outcome: Progressing     Problem: SAFETY,RESTRAINT: NV/NON-SELF DESTRUCTIVE BEHAVIOR  Goal: Remains free of harm/injury (restraint for non violent/non self-detsructive behavior)  Description: INTERVENTIONS:  - Instruct patient/family regarding restraint use   - Assess and monitor physiologic and psychological status   - Provide interventions and comfort measures to meet assessed patient needs   - Identify and implement measures to help patient regain control  - Assess readiness for release of  restraint   Outcome: Progressing  Goal: Returns to optimal restraint-free functioning  Description: INTERVENTIONS:  - Assess the patient's behavior and symptoms that indicate continued need for restraint  - Identify and implement measures to help patient regain control  - Assess readiness for release of restraint   Outcome: Progressing     Problem: NEUROSENSORY - ADULT  Goal: Achieves maximal functionality and self care  Description: INTERVENTIONS  - Monitor swallowing and airway patency with patient fatigue and changes in neurological status  - Encourage and assist patient to increase activity and self care.   - Encourage visually impaired, hearing impaired and aphasic patients to use assistive/communication devices  Outcome: Progressing

## 2024-06-29 NOTE — PROGRESS NOTES
[facility]  Progress Note  Name: Severiano Feliciano I  MRN: [id number]  Unit/Bed#: [place] [address] -01 I Date of Admission: 6/27/2024   Date of Service: 6/29/2024 I Hospital Day: 2    Assessment & Plan   * Viral URI with cough  Assessment & Plan  Patient with a new cough for the past 3 days, history of recent hospitalization and COPD, chronic oxygen use.  Patient presented with fever to the emergency room  Likely viral URI  Currently doing much better and is at the baseline with oxygen  Patient with a history of MRSA  Started cefepime and vancomycin due to high risk factors for multiple infections discontinue antibiotics   Procal negative x2   Chest x ray showing oplatelike opacities at the right greater than left base likely representing combination of chronic scarring and atelectasis unless there is compelling clinical evidence for superimposed pneumonia.       Sepsis (HCC)  Assessment & Plan  Patient presented with sepsis on admission  Source of sepsis is viral uri initial concern for bacterial pneumonia   Patient presented with fever, lactic acidosis, acute respiratory failure, tachypnea and tachycardia  started empirically with cefepime and vancomycin, d/c procal negative x 2  Blood cultures negative   S/p iv hydration    Acute respiratory failure (HCC)  Assessment & Plan  Acute on chronic respiratory  failure initally requiring 6 L of oxygen  Currently, on 3-1/2 L of oxygen which is his baseline    Acute metabolic encephalopathy  Assessment & Plan  Currently completely resolved  Most likely due to infection    Chronic respiratory failure with hypoxia and hypercapnia (HCC)  Assessment & Plan  Chronic COPD on 3-1/2 L of oxygen  Follows up with palliative care    Chronic congestive heart failure (HCC)  Assessment & Plan  Wt Readings from Last 3 Encounters:   06/27/24 55.1 kg (121 lb 7.6 oz)   05/29/24 54.4 kg (120 lb)   05/23/24 62.1 kg (136 lb 14.5 oz)     Patient is currently  euvolemic  We will provide with hydration due to sepsis          Benign essential hypertension  Assessment & Plan  Currently blood pressure is poorly controlled   We will provide with hydralazine as needed  Continue losartan 25 mg twice daily    History of DVT (deep vein thrombosis)  Assessment & Plan  History of DVT  Continue with heparin for prophylaxis    Chronic obstructive pulmonary disease, unspecified COPD type (HCC)  Assessment & Plan  Chronic COPD  Continue DuoNebs  Not in acute exacerbation, so does not require any steroids           VTE Pharmacologic Prophylaxis:   Moderate Risk (Score 3-4) - Pharmacological DVT Prophylaxis Ordered: heparin.    Mobility:   Basic Mobility Inpatient Raw Score: 17  -HLM Goal: 5: Stand one or more mins  JH-HLM Achieved: 2: Bed activities/Dependent transfer  JH-HLM Goal NOT achieved. Continue with multidisciplinary rounding and encourage appropriate mobility to improve upon -HLM goals.    Patient Centered Rounds: I performed bedside rounds with nursing staff today.   Discussions with Specialists or Other Care Team Provider: cm    Education and Discussions with Family / Patient: Updated  (wife) at bedside.    Total Time Spent on Date of Encounter in care of patient: 30 mins. This time was spent on one or more of the following: performing physical exam; counseling and coordination of care; obtaining or reviewing history; documenting in the medical record; reviewing/ordering tests, medications or procedures; communicating with other healthcare professionals and discussing with patient's family/caregivers.    Current Length of Stay: 2 day(s)  Current Patient Status: Inpatient   Certification Statement: The patient will continue to require additional inpatient hospital stay due to monitoring off antibiotics   Discharge Plan: Anticipate discharge in 24-48 hrs to home.    Code Status: Level 1 - Full Code    Subjective:   Patient was seen with his wife at bedside.   Breathing feels much improved today back to home baseline.  Patient still has mild cough.  Patient denies any other acute complaints at this time.  Wife is concerned he is constipated    Objective:     Vitals:   Temp (24hrs), Av.8 °F (36.6 °C), Min:97.3 °F (36.3 °C), Max:98.5 °F (36.9 °C)    Temp:  [97.3 °F (36.3 °C)-98.5 °F (36.9 °C)] 97.3 °F (36.3 °C)  HR:  [70-77] 70  Resp:  [16-38] 16  BP: (148-164)/(74-94) 153/92  SpO2:  [94 %-98 %] 96 %  Body mass index is 22.95 kg/m².     Input and Output Summary (last 24 hours):     Intake/Output Summary (Last 24 hours) at 2024 1036  Last data filed at 2024 0431  Gross per 24 hour   Intake 740 ml   Output 250 ml   Net 490 ml       Physical Exam:   Physical Exam  Vitals and nursing note reviewed.   Constitutional:       Appearance: Normal appearance.   HENT:      Head: Normocephalic and atraumatic.   Eyes:      General: No scleral icterus.  Cardiovascular:      Rate and Rhythm: Normal rate and regular rhythm.   Pulmonary:      Comments: Diminished breath sounds  Abdominal:      General: Abdomen is flat. Bowel sounds are normal.      Palpations: Abdomen is soft.   Musculoskeletal:      Right lower leg: No edema.      Left lower leg: No edema.   Skin:     General: Skin is warm and dry.   Neurological:      Mental Status: He is alert. Mental status is at baseline.   Psychiatric:         Mood and Affect: Mood normal.         Behavior: Behavior normal.        Additional Data:     Labs:  Results from last 7 days   Lab Units 24  0915 24  2327 24  1624   WBC Thousand/uL 6.66  --  9.15   HEMOGLOBIN g/dL 10.6*  --  10.6*   HEMATOCRIT % 35.6*  --  37.2   PLATELETS Thousands/uL 111*   < > 133*   SEGS PCT %  --   --  81*   LYMPHO PCT %  --   --  8*   MONO PCT %  --   --  10   EOS PCT %  --   --  1    < > = values in this interval not displayed.     Results from last 7 days   Lab Units 24  0915 24  1624   SODIUM mmol/L 143 143   POTASSIUM mmol/L  3.9 4.8   CHLORIDE mmol/L 96 92*   CO2 mmol/L >45* >45*   BUN mg/dL 18 21   CREATININE mg/dL 0.80 0.80   CALCIUM mg/dL 9.6 10.5*   ALBUMIN g/dL  --  3.8   TOTAL BILIRUBIN mg/dL  --  0.70   ALK PHOS U/L  --  64   ALT U/L  --  25   AST U/L  --  31   GLUCOSE RANDOM mg/dL 123 140         Results from last 7 days   Lab Units 06/27/24  1741   POC GLUCOSE mg/dl 118         Results from last 7 days   Lab Units 06/29/24  0915 06/28/24  0431 06/27/24  1905 06/27/24  1624   LACTIC ACID mmol/L  --   --  0.8 2.3*   PROCALCITONIN ng/ml 0.10 0.09  --  0.12       Lines/Drains:  Invasive Devices       Peripheral Intravenous Line  Duration             Peripheral IV 06/28/24 Left Forearm 1 day                          Imaging: No pertinent imaging reviewed.    Recent Cultures (last 7 days):   Results from last 7 days   Lab Units 06/28/24  0144 06/27/24  1638 06/27/24  1624   BLOOD CULTURE   --  No Growth at 24 hrs. No Growth at 24 hrs.   LEGIONELLA URINARY ANTIGEN  Negative  --   --        Last 24 Hours Medication List:   Current Facility-Administered Medications   Medication Dose Route Frequency Provider Last Rate    acetaminophen  650 mg Oral Q6H PRN Rula Haque MD      albuterol  2 puff Inhalation Q6H PRN Rula Haque MD      budesonide  0.5 mg Nebulization BID Rula Haque MD      formoterol  20 mcg Nebulization BID Rula Haque MD      heparin (porcine)  5,000 Units Subcutaneous Q8H [facility] Rula Haque MD      hydrALAZINE  5 mg Intravenous Q6H PRN Rula Haque MD      ipratropium-albuterol  3 mL Nebulization BID Rula Haque MD      losartan  25 mg Oral BID Rula Haque MD      melatonin  3 mg Oral HS Rula Haque MD      mometasone   Topical Daily Rula Haque MD      pantoprazole  40 mg Oral Early Morning Rula Haque MD      senna-docusate sodium  1 tablet Oral Daily Rula Haque MD          Today, Patient Was Seen By: Lizzie Greene PA-C    **Please Note: This note may have been  constructed using a voice recognition system.**

## 2024-06-29 NOTE — CONSULTS
Vancomycin IV Pharmacy-to-Dose Consultation     Vancomycin has been discontinued.  Pharmacy will sign off.  Please contact or re-consult with questions.    Cat Henry, Pharmacist

## 2024-06-30 VITALS
WEIGHT: 121.47 LBS | DIASTOLIC BLOOD PRESSURE: 105 MMHG | OXYGEN SATURATION: 100 % | HEIGHT: 61 IN | HEART RATE: 79 BPM | BODY MASS INDEX: 22.93 KG/M2 | TEMPERATURE: 98.1 F | SYSTOLIC BLOOD PRESSURE: 162 MMHG | RESPIRATION RATE: 17 BRPM

## 2024-06-30 LAB
BUN SERPL-MCNC: 16 MG/DL (ref 5–25)
CALCIUM SERPL-MCNC: 9.5 MG/DL (ref 8.4–10.2)
CHLORIDE SERPL-SCNC: 96 MMOL/L (ref 96–108)
CO2 SERPL-SCNC: >45 MMOL/L (ref 21–32)
CREAT SERPL-MCNC: 0.78 MG/DL (ref 0.6–1.3)
ERYTHROCYTE [DISTWIDTH] IN BLOOD BY AUTOMATED COUNT: 12.9 % (ref 11.6–15.1)
GFR SERPL CREATININE-BSD FRML MDRD: 81 ML/MIN/1.73SQ M
GLUCOSE SERPL-MCNC: 97 MG/DL (ref 65–140)
HCT VFR BLD AUTO: 35 % (ref 36.5–49.3)
HGB BLD-MCNC: 10.5 G/DL (ref 12–17)
MCH RBC QN AUTO: 31.5 PG (ref 26.8–34.3)
MCHC RBC AUTO-ENTMCNC: 30 G/DL (ref 31.4–37.4)
MCV RBC AUTO: 105 FL (ref 82–98)
MRSA NOSE QL CULT: NORMAL
PLATELET # BLD AUTO: 124 THOUSANDS/UL (ref 149–390)
PMV BLD AUTO: 9.5 FL (ref 8.9–12.7)
POTASSIUM SERPL-SCNC: 4.2 MMOL/L (ref 3.5–5.3)
PROCALCITONIN SERPL-MCNC: 0.1 NG/ML
RBC # BLD AUTO: 3.33 MILLION/UL (ref 3.88–5.62)
SODIUM SERPL-SCNC: 143 MMOL/L (ref 135–147)
WBC # BLD AUTO: 6.52 THOUSAND/UL (ref 4.31–10.16)

## 2024-06-30 PROCEDURE — 94660 CPAP INITIATION&MGMT: CPT

## 2024-06-30 PROCEDURE — 84145 PROCALCITONIN (PCT): CPT

## 2024-06-30 PROCEDURE — 94760 N-INVAS EAR/PLS OXIMETRY 1: CPT

## 2024-06-30 PROCEDURE — 94640 AIRWAY INHALATION TREATMENT: CPT

## 2024-06-30 PROCEDURE — 80048 BASIC METABOLIC PNL TOTAL CA: CPT

## 2024-06-30 PROCEDURE — 99239 HOSP IP/OBS DSCHRG MGMT >30: CPT

## 2024-06-30 PROCEDURE — 85027 COMPLETE CBC AUTOMATED: CPT

## 2024-06-30 RX ADMIN — IPRATROPIUM BROMIDE AND ALBUTEROL SULFATE 3 ML: 2.5; .5 SOLUTION RESPIRATORY (INHALATION) at 07:25

## 2024-06-30 RX ADMIN — LOSARTAN POTASSIUM 25 MG: 25 TABLET, FILM COATED ORAL at 09:42

## 2024-06-30 RX ADMIN — FORMOTEROL FUMARATE 20 MCG: 20 SOLUTION RESPIRATORY (INHALATION) at 07:25

## 2024-06-30 RX ADMIN — BUDESONIDE 0.5 MG: 0.5 INHALANT RESPIRATORY (INHALATION) at 07:25

## 2024-06-30 RX ADMIN — TRIAMCINOLONE ACETONIDE: 1 CREAM TOPICAL at 09:42

## 2024-06-30 RX ADMIN — POLYETHYLENE GLYCOL 3350 17 G: 17 POWDER, FOR SOLUTION ORAL at 09:42

## 2024-06-30 RX ADMIN — HEPARIN SODIUM 5000 UNITS: 5000 INJECTION INTRAVENOUS; SUBCUTANEOUS at 06:22

## 2024-06-30 RX ADMIN — SENNOSIDES AND DOCUSATE SODIUM 1 TABLET: 50; 8.6 TABLET ORAL at 09:42

## 2024-06-30 RX ADMIN — PANTOPRAZOLE SODIUM 40 MG: 40 TABLET, DELAYED RELEASE ORAL at 06:22

## 2024-06-30 NOTE — NURSING NOTE
Discharge instructions reviewed with pt wife. Wife verbalizes understanding of all discharge instructions. Discharge instructions given to family at time of discharge. IV removed, rg and telemetry discontinued. All questions addressed, no further questions or concerns at this time. Pt discharged to home with family. All belongings sent with pt.

## 2024-06-30 NOTE — ASSESSMENT & PLAN NOTE
Patient with a new cough for the past 3 days, history of recent hospitalization and COPD, chronic oxygen use.  Patient presented with fever to the emergency room  Likely viral URI  Currently doing much better and is at the baseline with oxygen  Patient with a history of MRSA  Started cefepime and vancomycin due to high risk factors for multiple infections discontinued antibiotics  Procal negative x2   Chest x ray showing oplatelike opacities at the right greater than left base likely representing combination of chronic scarring and atelectasis unless there is compelling clinical evidence for superimposed pneumonia.  Continue supportive care at home

## 2024-06-30 NOTE — ASSESSMENT & PLAN NOTE
Wt Readings from Last 3 Encounters:   06/27/24 55.1 kg (121 lb 7.6 oz)   05/29/24 54.4 kg (120 lb)   05/23/24 62.1 kg (136 lb 14.5 oz)     Patient is currently euvolemic  Received iv fluids due to sepsis

## 2024-06-30 NOTE — PLAN OF CARE
Problem: Potential for Falls  Goal: Patient will remain free of falls  Description: INTERVENTIONS:  - Educate patient/family on patient safety including physical limitations  - Instruct patient to call for assistance with activity   - Consult OT/PT to assist with strengthening/mobility   - Keep Call bell within reach  - Keep bed low and locked with side rails adjusted as appropriate  - Keep care items and personal belongings within reach  - Initiate and maintain comfort rounds  - Make Fall Risk Sign visible to staff  - Offer Toileting every 2 Hours, in advance of need  - Initiate/Maintain bed alarm  - Obtain necessary fall risk management equipment:   - Apply yellow socks and bracelet for high fall risk patients  - Consider moving patient to room near nurses station  Outcome: Progressing     Problem: Prexisting or High Potential for Compromised Skin Integrity  Goal: Skin integrity is maintained or improved  Description: INTERVENTIONS:  - Identify patients at risk for skin breakdown  - Assess and monitor skin integrity  - Assess and monitor nutrition and hydration status  - Monitor labs   - Assess for incontinence   - Turn and reposition patient  - Assist with mobility/ambulation  - Relieve pressure over bony prominences  - Avoid friction and shearing  - Provide appropriate hygiene as needed including keeping skin clean and dry  - Evaluate need for skin moisturizer/barrier cream  - Collaborate with interdisciplinary team   - Patient/family teaching  - Consider wound care consult   Outcome: Progressing     Problem: PAIN - ADULT  Goal: Verbalizes/displays adequate comfort level or baseline comfort level  Description: Interventions:  - Encourage patient to monitor pain and request assistance  - Assess pain using appropriate pain scale  - Administer analgesics based on type and severity of pain and evaluate response  - Implement non-pharmacological measures as appropriate and evaluate response  - Consider cultural and  social influences on pain and pain management  - Notify physician/advanced practitioner if interventions unsuccessful or patient reports new pain  Outcome: Progressing     Problem: INFECTION - ADULT  Goal: Absence or prevention of progression during hospitalization  Description: INTERVENTIONS:  - Assess and monitor for signs and symptoms of infection  - Monitor lab/diagnostic results  - Monitor all insertion sites, i.e. indwelling lines, tubes, and drains  - Monitor endotracheal if appropriate and nasal secretions for changes in amount and color  - [place] appropriate cooling/warming therapies per order  - Administer medications as ordered  - Instruct and encourage patient and family to use good hand hygiene technique  - Identify and instruct in appropriate isolation precautions for identified infection/condition  Outcome: Progressing  Goal: Absence of fever/infection during neutropenic period  Description: INTERVENTIONS:  - Monitor WBC    Outcome: Progressing     Problem: SAFETY ADULT  Goal: Patient will remain free of falls  Description: INTERVENTIONS:  - Educate patient/family on patient safety including physical limitations  - Instruct patient to call for assistance with activity   - Consult OT/PT to assist with strengthening/mobility   - Keep Call bell within reach  - Keep bed low and locked with side rails adjusted as appropriate  - Keep care items and personal belongings within reach  - Initiate and maintain comfort rounds  - Make Fall Risk Sign visible to staff  - Offer Toileting every 2 Hours, in advance of need  - Initiate/Maintain bed alarm  - Obtain necessary fall risk management equipment:   - Apply yellow socks and bracelet for high fall risk patients  - Consider moving patient to room near nurses station  Outcome: Progressing  Goal: Maintain or return to baseline ADL function  Description: INTERVENTIONS:  -  Assess patient's ability to carry out ADLs; assess patient's baseline for ADL function and  identify physical deficits which impact ability to perform ADLs (bathing, care of mouth/teeth, toileting, grooming, dressing, etc.)  - Assess/evaluate cause of self-care deficits   - Assess range of motion  - Assess patient's mobility; develop plan if impaired  - Assess patient's need for assistive devices and provide as appropriate  - Encourage maximum independence but intervene and supervise when necessary  - Involve family in performance of ADLs  - Assess for home care needs following discharge   - Consider OT consult to assist with ADL evaluation and planning for discharge  - Provide patient education as appropriate  Outcome: Progressing  Goal: Maintains/Returns to pre admission functional level  Description: INTERVENTIONS:  - Perform AM-PAC 6 Click Basic Mobility/ Daily Activity assessment daily.  - Set and communicate daily mobility goal to care team and patient/family/caregiver.   - Collaborate with rehabilitation services on mobility goals if consulted  - Perform Range of Motion 3 times a day.  - Reposition patient every 2 hours.  - Dangle patient 3 times a day  - Stand patient 3 times a day  - Ambulate patient 3 times a day  - Out of bed to chair 3 times a day   - Out of bed for meals 3 times a day  - Out of bed for toileting  - Record patient progress and toleration of activity level   Outcome: Progressing     Problem: DISCHARGE PLANNING  Goal: Discharge to home or other facility with appropriate resources  Description: INTERVENTIONS:  - Identify barriers to discharge w/patient and caregiver  - Arrange for needed discharge resources and transportation as appropriate  - Identify discharge learning needs (meds, wound care, etc.)  - Arrange for interpretive services to assist at discharge as needed  - Refer to Case Management Department for coordinating discharge planning if the patient needs post-hospital services based on physician/advanced practitioner order or complex needs related to functional status,  cognitive ability, or social support system  Outcome: Progressing     Problem: Knowledge Deficit  Goal: Patient/family/caregiver demonstrates understanding of disease process, treatment plan, medications, and discharge instructions  Description: Complete learning assessment and assess knowledge base.  Interventions:  - Provide teaching at level of understanding  - Provide teaching via preferred learning methods  Outcome: Progressing     Problem: CARDIOVASCULAR - ADULT  Goal: Absence of cardiac dysrhythmias or at baseline rhythm  Description: INTERVENTIONS:  - Continuous cardiac monitoring, vital signs, obtain 12 lead EKG if ordered  - Administer antiarrhythmic and heart rate control medications as ordered  - Monitor electrolytes and administer replacement therapy as ordered  Outcome: Progressing     Problem: RESPIRATORY - ADULT  Goal: Achieves optimal ventilation and oxygenation  Description: INTERVENTIONS:  - Assess for changes in respiratory status  - Assess for changes in mentation and behavior  - Position to facilitate oxygenation and minimize respiratory effort  - Oxygen administered by appropriate delivery if ordered  - Initiate smoking cessation education as indicated  - Encourage broncho-pulmonary hygiene including cough, deep breathe, Incentive Spirometry  - Assess the need for suctioning and aspirate as needed  - Assess and instruct to report SOB or any respiratory difficulty  - Respiratory Therapy support as indicated  Outcome: Progressing     Problem: NEUROSENSORY - ADULT  Goal: Achieves maximal functionality and self care  Description: INTERVENTIONS  - Monitor swallowing and airway patency with patient fatigue and changes in neurological status  - Encourage and assist patient to increase activity and self care.   - Encourage visually impaired, hearing impaired and aphasic patients to use assistive/communication devices  Outcome: Progressing     Problem: SAFETY,RESTRAINT: NV/NON-SELF DESTRUCTIVE  BEHAVIOR  Goal: Remains free of harm/injury (restraint for non violent/non self-detsructive behavior)  Description: INTERVENTIONS:  - Instruct patient/family regarding restraint use   - Assess and monitor physiologic and psychological status   - Provide interventions and comfort measures to meet assessed patient needs   - Identify and implement measures to help patient regain control  - Assess readiness for release of restraint   Outcome: Progressing  Goal: Returns to optimal restraint-free functioning  Description: INTERVENTIONS:  - Assess the patient's behavior and symptoms that indicate continued need for restraint  - Identify and implement measures to help patient regain control  - Assess readiness for release of restraint   Outcome: Progressing

## 2024-06-30 NOTE — PLAN OF CARE
Problem: Potential for Falls  Goal: Patient will remain free of falls  Description: INTERVENTIONS:  - Educate patient/family on patient safety including physical limitations  - Instruct patient to call for assistance with activity   - Consult OT/PT to assist with strengthening/mobility   - Keep Call bell within reach  - Keep bed low and locked with side rails adjusted as appropriate  - Keep care items and personal belongings within reach  - Initiate and maintain comfort rounds  - Make Fall Risk Sign visible to staff  - Offer Toileting every 2 Hours, in advance of need  - Initiate/Maintain alarm  - Obtain necessary fall risk management equipment  - Apply yellow socks and bracelet for high fall risk patients  - Consider moving patient to room near nurses station  Outcome: Progressing     Problem: Prexisting or High Potential for Compromised Skin Integrity  Goal: Skin integrity is maintained or improved  Description: INTERVENTIONS:  - Identify patients at risk for skin breakdown  - Assess and monitor skin integrity  - Assess and monitor nutrition and hydration status  - Monitor labs   - Assess for incontinence   - Turn and reposition patient  - Assist with mobility/ambulation  - Relieve pressure over bony prominences  - Avoid friction and shearing  - Provide appropriate hygiene as needed including keeping skin clean and dry  - Evaluate need for skin moisturizer/barrier cream  - Collaborate with interdisciplinary team   - Patient/family teaching  - Consider wound care consult   Outcome: Progressing     Problem: PAIN - ADULT  Goal: Verbalizes/displays adequate comfort level or baseline comfort level  Description: Interventions:  - Encourage patient to monitor pain and request assistance  - Assess pain using appropriate pain scale  - Administer analgesics based on type and severity of pain and evaluate response  - Implement non-pharmacological measures as appropriate and evaluate response  - Consider cultural and social  influences on pain and pain management  - Notify physician/advanced practitioner if interventions unsuccessful or patient reports new pain  Outcome: Progressing     Problem: INFECTION - ADULT  Goal: Absence or prevention of progression during hospitalization  Description: INTERVENTIONS:  - Assess and monitor for signs and symptoms of infection  - Monitor lab/diagnostic results  - Monitor all insertion sites, i.e. indwelling lines, tubes, and drains  - Monitor endotracheal if appropriate and nasal secretions for changes in amount and color  - Harris appropriate cooling/warming therapies per order  - Administer medications as ordered  - Instruct and encourage patient and family to use good hand hygiene technique  - Identify and instruct in appropriate isolation precautions for identified infection/condition  Outcome: Progressing  Goal: Absence of fever/infection during neutropenic period  Description: INTERVENTIONS:  - Monitor WBC    Outcome: Progressing     Problem: SAFETY ADULT  Goal: Patient will remain free of falls  Description: INTERVENTIONS:  - Educate patient/family on patient safety including physical limitations  - Instruct patient to call for assistance with activity   - Consult OT/PT to assist with strengthening/mobility   - Keep Call bell within reach  - Keep bed low and locked with side rails adjusted as appropriate  - Keep care items and personal belongings within reach  - Initiate and maintain comfort rounds  - Make Fall Risk Sign visible to staff  - Offer Toileting every 2 Hours, in advance of need  - Initiate/Maintain alarm  - Obtain necessary fall risk management equipment  - Apply yellow socks and bracelet for high fall risk patients  - Consider moving patient to room near nurses station  Outcome: Progressing  Goal: Maintain or return to baseline ADL function  Description: INTERVENTIONS:  -  Assess patient's ability to carry out ADLs; assess patient's baseline for ADL function and identify  physical deficits which impact ability to perform ADLs (bathing, care of mouth/teeth, toileting, grooming, dressing, etc.)  - Assess/evaluate cause of self-care deficits   - Assess range of motion  - Assess patient's mobility; develop plan if impaired  - Assess patient's need for assistive devices and provide as appropriate  - Encourage maximum independence but intervene and supervise when necessary  - Involve family in performance of ADLs  - Assess for home care needs following discharge   - Consider OT consult to assist with ADL evaluation and planning for discharge  - Provide patient education as appropriate  Outcome: Progressing  Goal: Maintains/Returns to pre admission functional level  Description: INTERVENTIONS:  - Perform AM-PAC 6 Click Basic Mobility/ Daily Activity assessment daily.  - Set and communicate daily mobility goal to care team and patient/family/caregiver.   - Collaborate with rehabilitation services on mobility goals if consulted  - Perform Range of Motion 3 times a day.  - Reposition patient every 2 hours.  - Dangle patient 3 times a day  - Stand patient 3 times a day  - Ambulate patient 3 times a day  - Out of bed to chair 3 times a day   - Out of bed for meals 3 times a day  - Out of bed for toileting  - Record patient progress and toleration of activity level   Outcome: Progressing     Problem: DISCHARGE PLANNING  Goal: Discharge to home or other facility with appropriate resources  Description: INTERVENTIONS:  - Identify barriers to discharge w/patient and caregiver  - Arrange for needed discharge resources and transportation as appropriate  - Identify discharge learning needs (meds, wound care, etc.)  - Arrange for interpretive services to assist at discharge as needed  - Refer to Case Management Department for coordinating discharge planning if the patient needs post-hospital services based on physician/advanced practitioner order or complex needs related to functional status, cognitive  ability, or social support system  Outcome: Progressing     Problem: Knowledge Deficit  Goal: Patient/family/caregiver demonstrates understanding of disease process, treatment plan, medications, and discharge instructions  Description: Complete learning assessment and assess knowledge base.  Interventions:  - Provide teaching at level of understanding  - Provide teaching via preferred learning methods  Outcome: Progressing     Problem: CARDIOVASCULAR - ADULT  Goal: Absence of cardiac dysrhythmias or at baseline rhythm  Description: INTERVENTIONS:  - Continuous cardiac monitoring, vital signs, obtain 12 lead EKG if ordered  - Administer antiarrhythmic and heart rate control medications as ordered  - Monitor electrolytes and administer replacement therapy as ordered  Outcome: Progressing     Problem: RESPIRATORY - ADULT  Goal: Achieves optimal ventilation and oxygenation  Description: INTERVENTIONS:  - Assess for changes in respiratory status  - Assess for changes in mentation and behavior  - Position to facilitate oxygenation and minimize respiratory effort  - Oxygen administered by appropriate delivery if ordered  - Initiate smoking cessation education as indicated  - Encourage broncho-pulmonary hygiene including cough, deep breathe, Incentive Spirometry  - Assess the need for suctioning and aspirate as needed  - Assess and instruct to report SOB or any respiratory difficulty  - Respiratory Therapy support as indicated  Outcome: Progressing     Problem: SAFETY,RESTRAINT: NV/NON-SELF DESTRUCTIVE BEHAVIOR  Goal: Remains free of harm/injury (restraint for non violent/non self-detsructive behavior)  Description: INTERVENTIONS:  - Instruct patient/family regarding restraint use   - Assess and monitor physiologic and psychological status   - Provide interventions and comfort measures to meet assessed patient needs   - Identify and implement measures to help patient regain control  - Assess readiness for release of  restraint   Outcome: Progressing  Goal: Returns to optimal restraint-free functioning  Description: INTERVENTIONS:  - Assess the patient's behavior and symptoms that indicate continued need for restraint  - Identify and implement measures to help patient regain control  - Assess readiness for release of restraint   Outcome: Progressing     Problem: NEUROSENSORY - ADULT  Goal: Achieves maximal functionality and self care  Description: INTERVENTIONS  - Monitor swallowing and airway patency with patient fatigue and changes in neurological status  - Encourage and assist patient to increase activity and self care.   - Encourage visually impaired, hearing impaired and aphasic patients to use assistive/communication devices  Outcome: Progressing

## 2024-06-30 NOTE — DISCHARGE INSTR - AVS FIRST PAGE
Dear Severiano Feliciano,     It was our pleasure to care for you here at [facility].  It is our hope that we were always able to exceed the expected standards for your care during your stay.  You were hospitalized due to viral cold.  You were cared for on the south 2 floor by Lizzie Greene PA-C under the service of Miriam Padilla MD with the [facility] who covers for your primary care physician (PCP), Kirby Casanova MD, while you were hospitalized.  If you have any questions or concerns related to this hospitalization, you may contact us at .  For follow up as well as any medication refills, we recommend that you follow up with your primary care physician.  A registered nurse will reach out to you by phone within a few days after your discharge to answer any additional questions that you may have after going home.  However, at this time we provide for you here, the most important instructions / recommendations at discharge:     Notable Medication Adjustments -   none  Testing Required after Discharge -   Repeat bmp   ** Please contact your PCP to request testing orders for any of the testing recommended here **  Important follow up information -   Follow up with family doctor  Other Instructions -    Continue to take care of yourself at home. Make sure you take all your home inhalers and nebulizers   Please review this entire after visit summary as additional general instructions including medication list, appointments, activity, diet, any pertinent wound care, and other additional recommendations from your care team that may be provided for you.      Sincerely,     Lizzie Greene PA-C

## 2024-06-30 NOTE — DISCHARGE SUMMARY
Former[facility]  Discharge- Severiano Feliciano 1935, 88 y.o. male MRN: [id number]  Unit/Bed#: [place] [address] -01 Encounter: [id number]  Primary Care Provider: Kirby Casanova MD   Date and time admitted to hospital: 6/27/2024  4:11 PM    * Viral URI with cough  Assessment & Plan  Patient with a new cough for the past 3 days, history of recent hospitalization and COPD, chronic oxygen use.  Patient presented with fever to the emergency room  Likely viral URI  Currently doing much better and is at the baseline with oxygen  Patient with a history of MRSA  Started cefepime and vancomycin due to high risk factors for multiple infections discontinued antibiotics  Procal negative x2   Chest x ray showing oplatelike opacities at the right greater than left base likely representing combination of chronic scarring and atelectasis unless there is compelling clinical evidence for superimposed pneumonia.  Continue supportive care at home       Sepsis (HCC)  Assessment & Plan  Patient presented with sepsis on admission  Source of sepsis is viral uri initial concern for bacterial pneumonia   Patient presented with fever, lactic acidosis, acute respiratory failure, tachypnea and tachycardia  started empirically with cefepime and vancomycin, d/c procal negative x 2  Blood cultures negative   S/p iv hydration    Acute respiratory failure (HCC)  Assessment & Plan  Acute on chronic respiratory  failure initally requiring 6 L of oxygen  Currently, on 3-1/2 L of oxygen which is his baseline    Acute metabolic encephalopathy  Assessment & Plan  Currently completely resolved  Most likely due to infection    Chronic respiratory failure with hypoxia and hypercapnia (HCC)  Assessment & Plan  Chronic COPD on 3-1/2 L of oxygen  Follows up with palliative care    Chronic congestive heart failure (HCC)  Assessment & Plan  Wt Readings from Last 3 Encounters:   06/27/24 55.1 kg (121 lb 7.6 oz)   05/29/24 54.4 kg (120 lb)    05/23/24 62.1 kg (136 lb 14.5 oz)     Patient is currently euvolemic  Received iv fluids due to sepsis          Benign essential hypertension  Assessment & Plan  Currently blood pressure is poorly controlled   We will provide with hydralazine as needed  Continue losartan 25 mg twice daily    History of DVT (deep vein thrombosis)  Assessment & Plan  History of DVT  Continue with heparin for prophylaxis    Chronic obstructive pulmonary disease, unspecified COPD type (HCC)  Assessment & Plan  Chronic COPD  Continue DuoNebs  Not in acute exacerbation, so does not require any steroids      Medical Problems       Resolved Problems  Date Reviewed: 6/30/2024   None       Discharging Physician / Practitioner: Lizzie Greene PA-C  PCP: Kirby Casanova MD  Admission Date:   Admission Orders (From admission, onward)       Ordered        06/27/24 1824  INPATIENT ADMISSION  Once                          Discharge Date: 06/30/24    Consultations During Hospital Stay:  Pharmacy  Procedures Performed:   None    Significant Findings / Test Results:   XR chest 1 view portable   Final Result by Gustavo Waters MD (06/28 1867)      Platelike opacities at the right greater than left base likely representing combination of chronic scarring and atelectasis unless there is compelling clinical evidence for superimposed pneumonia.            Workstation performed: [id number]              Incidental Findings:   None  Test Results Pending at Discharge (will require follow up):   None  Outpatient Tests Requested:  None    Complications: None    Reason for Admission: New cough and increased oxygen requirements    Hospital Course:   Severiano Feliciano is a 88 y.o. male patient who originally presented to the hospital on 6/27/2024 due to cough and altered mental status.  Patient had been feeling sick over the past 3 days prior to arrival he was started on antibiotic and prednisone by his family doctor but he then developed fever  "confusion and agitation.  Patient was found to meet sepsis criteria on presentation and was started on IV antibiotics for treatment of suspected community-acquired pneumonia.  Patient's procalcitonin was negative x 2, chest x-ray was more concerning for chronic scarring and atelectasis than pneumonia.  IV antibiotics were discontinued.  Patient oxygen requirements improved to his baseline and his mental status returned to baseline.  He was medically stable for discharge today.  Patient's symptoms were secondary to viral URI.  He will follow-up with his family doctor he will continue on home inhaler and nebulizer regimen.    Please see above list of diagnoses and related plan for additional information.     Condition at Discharge: stable    Discharge Day Visit / Exam:   Subjective: Was seen sitting in chair at bedside with wife present.  He feels that his breathing is back to baseline.  He feels much better today.  He denies any acute complaints  Vitals: Blood Pressure: (!) 162/105 (06/30/24 0759)  Pulse: 79 (06/30/24 0759)  Temperature: 98.1 °F (36.7 °C) (06/30/24 0759)  Temp Source: Oral (06/30/24 0759)  Respirations: 17 (06/30/24 0759)  Height: 5' 1\" (154.9 cm) (06/27/24 2145)  Weight - Scale: 55.1 kg (121 lb 7.6 oz) (06/27/24 2145)  SpO2: 100 % (06/30/24 0803)  Exam:   Physical Exam  Vitals and nursing note reviewed.   Constitutional:       Appearance: Normal appearance.   HENT:      Head: Normocephalic and atraumatic.   Eyes:      General: No scleral icterus.  Cardiovascular:      Rate and Rhythm: Normal rate and regular rhythm.   Pulmonary:      Effort: Pulmonary effort is normal.      Comments: Diminished  Abdominal:      General: Abdomen is flat. Bowel sounds are normal.      Palpations: Abdomen is soft.   Musculoskeletal:      Right lower leg: No edema.      Left lower leg: No edema.   Skin:     General: Skin is warm and dry.   Neurological:      Mental Status: He is alert. Mental status is at baseline. "   Psychiatric:         Mood and Affect: Mood normal.         Behavior: Behavior normal.          Discussion with Family: Updated  (wife) at bedside.    Discharge instructions/Information to patient and family:   See after visit summary for information provided to patient and family.      Provisions for Follow-Up Care:  See after visit summary for information related to follow-up care and any pertinent home health orders.      Mobility at time of Discharge:   Basic Mobility Inpatient Raw Score: 17  JH-HLM Goal: 5: Stand one or more mins  JH-HLM Achieved: 2: Bed activities/Dependent transfer  HLM Goal NOT achieved. Continue to encourage mobility in post discharge setting.     Disposition:   Home    Planned Readmission: None     Discharge Statement:  I spent 60 minutes discharging the patient. This time was spent on the day of discharge. I had direct contact with the patient on the day of discharge. Greater than 50% of the total time was spent examining patient, answering all patient questions, arranging and discussing plan of care with patient as well as directly providing post-discharge instructions.  Additional time then spent on discharge activities.    Discharge Medications:  See after visit summary for reconciled discharge medications provided to patient and/or family.      **Please Note: This note may have been constructed using a voice recognition system**

## 2024-06-30 NOTE — PLAN OF CARE
Problem: Potential for Falls  Goal: Patient will remain free of falls  Description: INTERVENTIONS:  - Educate patient/family on patient safety including physical limitations  - Instruct patient to call for assistance with activity   - Consult OT/PT to assist with strengthening/mobility   - Keep Call bell within reach  - Keep bed low and locked with side rails adjusted as appropriate  - Keep care items and personal belongings within reach  - Initiate and maintain comfort rounds  - Make Fall Risk Sign visible to staff  - Offer Toileting every 2 Hours, in advance of need  - Initiate/Maintain alarm  - Obtain necessary fall risk management equipment  - Apply yellow socks and bracelet for high fall risk patients  - Consider moving patient to room near nurses station  6/30/2024 1056 by Kaitlyn Arreola RN  Outcome: Adequate for Discharge  6/30/2024 1051 by Kaitlyn Arreola RN  Outcome: Progressing     Problem: Prexisting or High Potential for Compromised Skin Integrity  Goal: Skin integrity is maintained or improved  Description: INTERVENTIONS:  - Identify patients at risk for skin breakdown  - Assess and monitor skin integrity  - Assess and monitor nutrition and hydration status  - Monitor labs   - Assess for incontinence   - Turn and reposition patient  - Assist with mobility/ambulation  - Relieve pressure over bony prominences  - Avoid friction and shearing  - Provide appropriate hygiene as needed including keeping skin clean and dry  - Evaluate need for skin moisturizer/barrier cream  - Collaborate with interdisciplinary team   - Patient/family teaching  - Consider wound care consult   6/30/2024 1056 by Kaitlyn Arreola RN  Outcome: Adequate for Discharge  6/30/2024 1051 by Kaitlyn Arreola RN  Outcome: Progressing     Problem: PAIN - ADULT  Goal: Verbalizes/displays adequate comfort level or baseline comfort level  Description: Interventions:  - Encourage patient to monitor pain and request assistance  - Assess pain using  appropriate pain scale  - Administer analgesics based on type and severity of pain and evaluate response  - Implement non-pharmacological measures as appropriate and evaluate response  - Consider cultural and social influences on pain and pain management  - Notify physician/advanced practitioner if interventions unsuccessful or patient reports new pain  6/30/2024 1056 by Kaitlyn Arreola RN  Outcome: Adequate for Discharge  6/30/2024 1051 by Kaitlyn Arreola RN  Outcome: Progressing     Problem: INFECTION - ADULT  Goal: Absence or prevention of progression during hospitalization  Description: INTERVENTIONS:  - Assess and monitor for signs and symptoms of infection  - Monitor lab/diagnostic results  - Monitor all insertion sites, i.e. indwelling lines, tubes, and drains  - Monitor endotracheal if appropriate and nasal secretions for changes in amount and color  - [place] appropriate cooling/warming therapies per order  - Administer medications as ordered  - Instruct and encourage patient and family to use good hand hygiene technique  - Identify and instruct in appropriate isolation precautions for identified infection/condition  6/30/2024 1056 by Kaitlyn Arreola RN  Outcome: Adequate for Discharge  6/30/2024 1051 by Kaitlyn Arreola RN  Outcome: Progressing  Goal: Absence of fever/infection during neutropenic period  Description: INTERVENTIONS:  - Monitor WBC    6/30/2024 1056 by Kaitlyn Arreola RN  Outcome: Adequate for Discharge  6/30/2024 1051 by Kaitlyn Arreola RN  Outcome: Progressing     Problem: SAFETY ADULT  Goal: Patient will remain free of falls  Description: INTERVENTIONS:  - Educate patient/family on patient safety including physical limitations  - Instruct patient to call for assistance with activity   - Consult OT/PT to assist with strengthening/mobility   - Keep Call bell within reach  - Keep bed low and locked with side rails adjusted as appropriate  - Keep care items and personal belongings within reach  - Initiate and  maintain comfort rounds  - Make Fall Risk Sign visible to staff  - Offer Toileting every 2 Hours, in advance of need  - Initiate/Maintain alarm  - Obtain necessary fall risk management equipment  - Apply yellow socks and bracelet for high fall risk patients  - Consider moving patient to room near nurses station  6/30/2024 1056 by Kaitlyn Arreola RN  Outcome: Adequate for Discharge  6/30/2024 1051 by Kaitlyn Arreola RN  Outcome: Progressing  Goal: Maintain or return to baseline ADL function  Description: INTERVENTIONS:  -  Assess patient's ability to carry out ADLs; assess patient's baseline for ADL function and identify physical deficits which impact ability to perform ADLs (bathing, care of mouth/teeth, toileting, grooming, dressing, etc.)  - Assess/evaluate cause of self-care deficits   - Assess range of motion  - Assess patient's mobility; develop plan if impaired  - Assess patient's need for assistive devices and provide as appropriate  - Encourage maximum independence but intervene and supervise when necessary  - Involve family in performance of ADLs  - Assess for home care needs following discharge   - Consider OT consult to assist with ADL evaluation and planning for discharge  - Provide patient education as appropriate  6/30/2024 1056 by Kaitlyn Arreola RN  Outcome: Adequate for Discharge  6/30/2024 1051 by Kaitlyn Arreola RN  Outcome: Progressing  Goal: Maintains/Returns to pre admission functional level  Description: INTERVENTIONS:  - Perform AM-PAC 6 Click Basic Mobility/ Daily Activity assessment daily.  - Set and communicate daily mobility goal to care team and patient/family/caregiver.   - Collaborate with rehabilitation services on mobility goals if consulted  - Perform Range of Motion 3 times a day.  - Reposition patient every 2 hours.  - Dangle patient 3 times a day  - Stand patient 3 times a day  - Ambulate patient 3 times a day  - Out of bed to chair 3 times a day   - Out of bed for meals 3 times a day  - Out  of bed for toileting  - Record patient progress and toleration of activity level   6/30/2024 1056 by Kaitlyn Arreola RN  Outcome: Adequate for Discharge  6/30/2024 1051 by Kaitlyn Arreola RN  Outcome: Progressing     Problem: DISCHARGE PLANNING  Goal: Discharge to home or other facility with appropriate resources  Description: INTERVENTIONS:  - Identify barriers to discharge w/patient and caregiver  - Arrange for needed discharge resources and transportation as appropriate  - Identify discharge learning needs (meds, wound care, etc.)  - Arrange for interpretive services to assist at discharge as needed  - Refer to Case Management Department for coordinating discharge planning if the patient needs post-hospital services based on physician/advanced practitioner order or complex needs related to functional status, cognitive ability, or social support system  6/30/2024 1056 by Kaitlyn Arreola RN  Outcome: Adequate for Discharge  6/30/2024 1051 by Kaitlyn Arreola RN  Outcome: Progressing     Problem: Knowledge Deficit  Goal: Patient/family/caregiver demonstrates understanding of disease process, treatment plan, medications, and discharge instructions  Description: Complete learning assessment and assess knowledge base.  Interventions:  - Provide teaching at level of understanding  - Provide teaching via preferred learning methods  6/30/2024 1056 by Kaitlyn Arreola RN  Outcome: Adequate for Discharge  6/30/2024 1051 by Kaitlyn Arreola RN  Outcome: Progressing     Problem: CARDIOVASCULAR - ADULT  Goal: Absence of cardiac dysrhythmias or at baseline rhythm  Description: INTERVENTIONS:  - Continuous cardiac monitoring, vital signs, obtain 12 lead EKG if ordered  - Administer antiarrhythmic and heart rate control medications as ordered  - Monitor electrolytes and administer replacement therapy as ordered  6/30/2024 1056 by Kaitlyn Arreola RN  Outcome: Adequate for Discharge  6/30/2024 1051 by Kaitlyn Arreola RN  Outcome: Progressing     Problem:  RESPIRATORY - ADULT  Goal: Achieves optimal ventilation and oxygenation  Description: INTERVENTIONS:  - Assess for changes in respiratory status  - Assess for changes in mentation and behavior  - Position to facilitate oxygenation and minimize respiratory effort  - Oxygen administered by appropriate delivery if ordered  - Initiate smoking cessation education as indicated  - Encourage broncho-pulmonary hygiene including cough, deep breathe, Incentive Spirometry  - Assess the need for suctioning and aspirate as needed  - Assess and instruct to report SOB or any respiratory difficulty  - Respiratory Therapy support as indicated  6/30/2024 1056 by Kaitlyn Arreola RN  Outcome: Adequate for Discharge  6/30/2024 1051 by Kaitlyn Arreola RN  Outcome: Progressing     Problem: SAFETY,RESTRAINT: NV/NON-SELF DESTRUCTIVE BEHAVIOR  Goal: Remains free of harm/injury (restraint for non violent/non self-detsructive behavior)  Description: INTERVENTIONS:  - Instruct patient/family regarding restraint use   - Assess and monitor physiologic and psychological status   - Provide interventions and comfort measures to meet assessed patient needs   - Identify and implement measures to help patient regain control  - Assess readiness for release of restraint   6/30/2024 1056 by Kaitlyn Arreola RN  Outcome: Adequate for Discharge  6/30/2024 1051 by Kaitlyn Arreola RN  Outcome: Progressing  Goal: Returns to optimal restraint-free functioning  Description: INTERVENTIONS:  - Assess the patient's behavior and symptoms that indicate continued need for restraint  - Identify and implement measures to help patient regain control  - Assess readiness for release of restraint   6/30/2024 1056 by Kaitlyn Arreola RN  Outcome: Adequate for Discharge  6/30/2024 1051 by Kaitlyn Arreola RN  Outcome: Progressing     Problem: PHYSICAL THERAPY ADULT  Goal: Performs mobility at highest level of function for planned discharge setting.  See evaluation for individualized  goals.  Description: Treatment/Interventions: Functional transfer training, LE strengthening/ROM, Elevations, Therapeutic exercise, Endurance training, Patient/family training, Bed mobility, Gait training, Spoke to nursing, OT, Family, Cognitive reorientation  Equipment Recommended: Walker (pt has)       See flowsheet documentation for full assessment, interventions and recommendations.  Outcome: Adequate for Discharge     Problem: OCCUPATIONAL THERAPY ADULT  Goal: Performs self-care activities at highest level of function for planned discharge setting.  See evaluation for individualized goals.  Description: Treatment Interventions: ADL retraining, Functional transfer training, UE strengthening/ROM, Endurance training, Patient/family training, Equipment evaluation/education, Compensatory technique education, Continued evaluation, Activityengagement          See flowsheet documentation for full assessment, interventions and recommendations.   Outcome: Adequate for Discharge     Problem: NEUROSENSORY - ADULT  Goal: Achieves maximal functionality and self care  Description: INTERVENTIONS  - Monitor swallowing and airway patency with patient fatigue and changes in neurological status  - Encourage and assist patient to increase activity and self care.   - Encourage visually impaired, hearing impaired and aphasic patients to use assistive/communication devices  6/30/2024 1056 by Kaitlyn Arreola RN  Outcome: Adequate for Discharge  6/30/2024 1051 by Kaitlyn Arreola, RN  Outcome: Progressing

## 2024-07-01 NOTE — UTILIZATION REVIEW
Notification of Unplanned, Urgent, or   Emergency Inpatient Admission   AUTHORIZATION REQUEST   [place] [address]  Tax ID: [id number]  NPI: [id number]  [facility] Service: [facility]  Admission Level of Care: Inpatient  Place of Service Code: 21     Attending Physician Information  Attending Name and NPI#: Miriam Padilla Md [[id number]]  Phone: [phone]     Admission Information  Inpatient Admission Date/Time: 6/27/24  6:24 PM  Discharge Date/Time: 6/30/2024 12:36 PM  Admitting Diagnosis Code/Description:  Cough [R05.9]  Altered mental status [R41.82]  Pneumonia [J18.9]  Hypoxia [R09.02]  Fever [R50.9]     Utilization Review Contact  Tamika Dominguez Utilization   Phone: [phone]  Fax: [phone]  Email: [email or web address]  Contact for approvals/pending authorizations, clinical reviews, and discharge.     Physician Advisory Services Contact  Medical Necessity Denial & [facility] Discussion  Phone: [phone]  Fax: [phone]  Email: [email or web address]     DISCHARGE SUPPORT TEAM:  For Patients Discharge Needs & Updates  Phone: [phone] opt. 2 Fax: [phone]  Email: [email or web address]

## 2024-07-01 NOTE — UTILIZATION REVIEW
NOTIFICATION OF ADMISSION DISCHARGE   This is a Notification of Discharge from [facility]. Please be advised that this patient has been discharge from our facility. Below you will find the admission and discharge date and time including the patient’s disposition.   UTILIZATION REVIEW CONTACT:  Tamika Dominguez  Utilization   Network Utilization Review Department  Phone: [phone] x carefully listen to the prompts. All voicemails are confidential.  Email: [email or web address]     ADMISSION INFORMATION  PRESENTATION DATE: 6/27/2024  4:11 PM  OBERVATION ADMISSION DATE: N/A  INPATIENT ADMISSION DATE: 6/27/24  6:24 PM   DISCHARGE DATE: 6/30/2024 12:36 PM   DISPOSITION:Home/Self Care    Network Utilization Review Department  ATTENTION: Please call with any questions or concerns to [phone] and carefully listen to the prompts so that you are directed to the right person. All voicemails are confidential.   For Discharge needs, contact Care Management DC Support Team at [phone] opt. 2  Send all requests for admission clinical reviews, approved or denied determinations and any other requests to dedicated fax number below belonging to the campus where the patient is receiving treatment. List of dedicated fax numbers for the Facilities:  FACILITY NAME UR FAX NUMBER   ADMISSION DENIALS (Administrative/Medical Necessity) [phone]   DISCHARGE SUPPORT TEAM ([facility]) [phone]   PARENT CHILD HEALTH (Maternity/NICU/Pediatrics) [phone]   [facility] [phone]   [facility] [phone]   [facility] [phone]   [facility] [phone]   [facility] [phone]   [facility] [phone]   [facility] [phone]   [facility] [phone]    [facility] [phone]   [facility] [phone]   [facility] [phone]   [facility] [phone]

## 2024-07-02 ENCOUNTER — TRANSITIONAL CARE MANAGEMENT (OUTPATIENT)
Dept: FAMILY MEDICINE CLINIC | Facility: CLINIC | Age: 89
End: 2024-07-02

## 2024-07-02 ENCOUNTER — CLINICAL SUPPORT (OUTPATIENT)
Dept: PULMONOLOGY | Facility: CLINIC | Age: 89
End: 2024-07-02
Payer: MEDICARE

## 2024-07-02 ENCOUNTER — APPOINTMENT (OUTPATIENT)
Dept: PULMONOLOGY | Facility: CLINIC | Age: 89
End: 2024-07-02
Payer: MEDICARE

## 2024-07-02 DIAGNOSIS — J44.9 CHRONIC OBSTRUCTIVE PULMONARY DISEASE, UNSPECIFIED COPD TYPE (HCC): Primary | ICD-10-CM

## 2024-07-02 LAB
BACTERIA BLD CULT: NORMAL
BACTERIA BLD CULT: NORMAL

## 2024-07-02 PROCEDURE — G0239 OTH RESP PROC, GROUP: HCPCS

## 2024-07-09 ENCOUNTER — CLINICAL SUPPORT (OUTPATIENT)
Dept: PULMONOLOGY | Facility: CLINIC | Age: 89
End: 2024-07-09
Payer: MEDICARE

## 2024-07-09 DIAGNOSIS — K59.04 CHRONIC IDIOPATHIC CONSTIPATION: ICD-10-CM

## 2024-07-09 DIAGNOSIS — M54.50 CHRONIC BILATERAL LOW BACK PAIN WITHOUT SCIATICA: ICD-10-CM

## 2024-07-09 DIAGNOSIS — G89.29 CHRONIC BILATERAL LOW BACK PAIN WITHOUT SCIATICA: ICD-10-CM

## 2024-07-09 DIAGNOSIS — J44.9 CHRONIC OBSTRUCTIVE PULMONARY DISEASE, UNSPECIFIED COPD TYPE (HCC): Primary | ICD-10-CM

## 2024-07-09 PROCEDURE — G0239 OTH RESP PROC, GROUP: HCPCS

## 2024-07-09 RX ORDER — ASPIRIN 81 MG
1 TABLET, DELAYED RELEASE (ENTERIC COATED) ORAL DAILY
Qty: 30 TABLET | Refills: 5 | Status: SHIPPED | OUTPATIENT
Start: 2024-07-09

## 2024-07-09 RX ORDER — SENNOSIDES 8.6 MG
650 CAPSULE ORAL EVERY 8 HOURS PRN
Qty: 90 TABLET | Refills: 5 | Status: SHIPPED | OUTPATIENT
Start: 2024-07-09

## 2024-07-10 ENCOUNTER — OFFICE VISIT (OUTPATIENT)
Dept: FAMILY MEDICINE CLINIC | Facility: CLINIC | Age: 89
End: 2024-07-10
Payer: MEDICARE

## 2024-07-10 VITALS
HEIGHT: 63 IN | RESPIRATION RATE: 24 BRPM | HEART RATE: 72 BPM | WEIGHT: 130 LBS | SYSTOLIC BLOOD PRESSURE: 120 MMHG | OXYGEN SATURATION: 94 % | BODY MASS INDEX: 23.04 KG/M2 | TEMPERATURE: 97.9 F | DIASTOLIC BLOOD PRESSURE: 70 MMHG

## 2024-07-10 DIAGNOSIS — J44.9 CHRONIC OBSTRUCTIVE PULMONARY DISEASE, UNSPECIFIED COPD TYPE (HCC): ICD-10-CM

## 2024-07-10 DIAGNOSIS — L21.9 SEBORRHEIC DERMATITIS: ICD-10-CM

## 2024-07-10 DIAGNOSIS — Z76.89 ENCOUNTER FOR SUPPORT AND COORDINATION OF TRANSITION OF CARE: Primary | ICD-10-CM

## 2024-07-10 DIAGNOSIS — I50.9 ACUTE ON CHRONIC CONGESTIVE HEART FAILURE, UNSPECIFIED HEART FAILURE TYPE (HCC): ICD-10-CM

## 2024-07-10 PROCEDURE — 99495 TRANSJ CARE MGMT MOD F2F 14D: CPT | Performed by: FAMILY MEDICINE

## 2024-07-10 RX ORDER — TORSEMIDE 5 MG/1
5 TABLET ORAL DAILY
Qty: 30 TABLET | Refills: 5 | Status: SHIPPED | OUTPATIENT
Start: 2024-07-10

## 2024-07-10 RX ORDER — KETOCONAZOLE 20 MG/ML
1 SHAMPOO TOPICAL 2 TIMES WEEKLY
Qty: 120 ML | Refills: 5 | Status: SHIPPED | OUTPATIENT
Start: 2024-07-11

## 2024-07-10 RX ORDER — MOMETASONE FUROATE 1 MG/ML
SOLUTION TOPICAL DAILY
Qty: 60 ML | Refills: 5 | Status: SHIPPED | OUTPATIENT
Start: 2024-07-10

## 2024-07-10 NOTE — PROGRESS NOTES
Name: Severiano Feliciano      : 1935      MRN: [id number]  Encounter Provider: Kirby Casanova MD  Encounter Date: 7/10/2024   Encounter department: [facility]     Follow-Up for Transition of Care Management    Chief Complaint:  Transition of Care Management  UTI with cough, respiratory failure.  Weight Gain    History of Present Illness:  The patient was hospitalized from  to  under the care of Dr. Richi Crespo. The patient presented with a three-day history of cough and fever (100.7°F) and was initially suspected of having sepsis, which was later ruled out. The patient was diagnosed with a viral infection. During hospitalization, the patient's weight was stable at 121 lbs, and there was a concern about potential pulmonary congestion. The patient was discharged with instructions to take diuretics to prevent fluid accumulation. The patient is currently on torsemide 5 mg daily to manage fluid retention and prevent rehospitalization.    Medications:  ketoconazole (NIZORAL) 2% shampoo, Apply 1 Application topically 2 times a week, Disp: 120 mL, Rfl: 5  mometasone (ELOCON) 0.1% lotion, Apply topically daily, Disp: 60 mL, Rfl: 5  torsemide (DEMADEX) 5 MG tablet, Take 1 tablet (5 mg total) by mouth daily, Disp: 30 tablet, Rfl: 5  acetaminophen (TYLENOL) 650 mg CR tablet, TAKE 1 TABLET (650 MG TOTAL) BY MOUTH EVERY 8 HOURS AS NEEDED FOR MILD PAIN, Disp: 90 tablet, Rfl: 5  albuterol (PROVENTIL HFA, VENTOLIN HFA) 90 mcg/act inhaler, INHALE 2 PUFFS EVERY 6 HOURS AS NEEDED FOR WHEEZING, Disp: 18 g, Rfl: 5  budesonide (Pulmicort) 0.5 mg/2 mL nebulizer solution, Take 2 mL (0.5 mg total) by nebulization 2 times a day Rinse mouth after use., Disp: 360 mL, Rfl: 3  formoterol (PERFOROMIST) 20 MCG/2ML nebulizer solution, Take 2 mL (20 mcg total) by nebulization 2 times a day, Disp: 360 mL, Rfl: 3  ipratropium-albuterol (DUO-NEB) 0.5-2.5 mg/3 mL nebulizer  solution, Take 3 mL by nebulization 2 times a day, Disp: 540 mL, Rfl: 3  losartan (COZAAR) 25 mg tablet, TAKE ONE TABLET BY MOUTH TWICE DAILY, Disp: 180 tablet, Rfl: 0  pantoprazole (PROTONIX) 40 mg tablet, TAKE 1 TABLET (40 MG TOTAL) BY MOUTH DAILY, Disp: 30 tablet, Rfl: 5  Senna Plus 8.6-50 MG per tablet, TAKE 1 TABLET BY MOUTH DAILY, Disp: 30 tablet, Rfl: 5    Allergies:  Penicillins: Hives, Itching and Rash  Lisinopril: Rash, Side pains and rash  Zyprexa [Olanzapine]: Tongue Swelling    Past Medical History:  Acute metabolic encephalopathy (06/13/2020)  Age-related cataract of right eye (04/04/2023)  Anemia  Aneurysm, aorta, thoracic (HCC)  Appendicolith  Ascending aortic aneurysm (HCC) 3.7  Asthma  BPH (benign prostatic hyperplasia)  CAD (coronary artery disease) noted on CT scan  CHF (congestive heart failure) (HCC)  COPD (chronic obstructive pulmonary disease) (HCC)  Descending thoracic aortic aneurysm (HCC)  Diabetes mellitus (HCC)  Diverticulosis  Former tobacco use  GERD (gastroesophageal reflux disease)  History of DVT (deep vein thrombosis) Left leg  History of transfusion  Hypertension  Hypoxemia (04/30/2023)  Inguinal hernia (right)  Nephrolithiasis  Oxygen dependent (2LNC)  Pneumonia  Pre-diabetes  Prostate calculus  PVD (peripheral vascular disease) (HCC)  Recurrent right inguinal hernia w incarceration (01/04/2023)  Thoracic aortic aneurysm without rupture (HCC) (11/19/2018)  Thrombocytopenia (HCC) (12/29/2018)  Ulcer  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    Review of Systems:  Respiratory: Negative for apnea, cough, shortness of breath, and wheezing.  Cardiovascular: Negative for chest pain and palpitations.  Gastrointestinal: Negative for bowel incontinence.  Genitourinary: Negative for bladder incontinence, dysuria, and pelvic pain.  Musculoskeletal: Positive for back pain (midline lower back).  Skin: Negative.  Neurological: Negative for tingling, weakness, and headaches.  Psychiatric/Behavioral: Negative.    Vital Signs:  /70 (BP Location: Left arm, Patient Position: Sitting, Cuff Size: Standard)  Pulse 72  Temp 97.9 °F (36.6 °C) (Tympanic)  Resp 24  Ht 5' 3\" (1.6 m)  Wt 59 kg (130 lb)  SpO2 94% (pt wearing 3 liter oxygen)  BMI 23.03 kg/m²    Physical Exam:  Non distress, normal respiratory effort.  Pulse is regular.  Heart without murmurs.    Lab Results:  No lab results provided.    Imaging and Other Relevant Results:  No imaging results provided.    Assessment and Plan:  1. Encounter for support and coordination of transition of care  2. Suspect Acute on chronic congestive heart failure, unspecified heart failure type (HCC)  - will start torsemide (DEMADEX) 5 MG tablet; Take 1 tablet (5 mg total) by mouth daily  3. Chronic obstructive pulmonary disease, unspecified COPD type (HCC)  4. Seborrheic dermatitis  - will re-start ketoconazole (NIZORAL) 2% shampoo; Apply 1 Application topically 2 times a week  - Continue mometasone (ELOCON) 0.1% lotion; Apply topically daily        Kirby Casanova MD   [facility] CARE [facility]"

## 2024-07-11 ENCOUNTER — CLINICAL SUPPORT (OUTPATIENT)
Dept: PULMONOLOGY | Facility: CLINIC | Age: 89
End: 2024-07-11
Payer: MEDICARE

## 2024-07-11 DIAGNOSIS — J44.9 CHRONIC OBSTRUCTIVE PULMONARY DISEASE, UNSPECIFIED COPD TYPE (HCC): Primary | ICD-10-CM

## 2024-07-11 PROCEDURE — G0239 OTH RESP PROC, GROUP: HCPCS

## 2024-07-12 ENCOUNTER — TELEPHONE (OUTPATIENT)
Age: 89
End: 2024-07-12

## 2024-07-12 NOTE — TELEPHONE ENCOUNTER
Please call patient, he can hold the torsemide for now and follow-up call on Monday - ER if worsening shortness of breath - epic chat sent to Dr. Casanova as unclear patient's clinical status after recent hospitalization that was requiring torsemide but recommend holding it and ER if worsening shortness of breath or fever     Lab Results       Component                Value               Date                       BNP                      47                  06/27/2024

## 2024-07-12 NOTE — TELEPHONE ENCOUNTER
Viridiana, the patient's spouse, stated that the medication torsemide (demadex), which was given due to fluid in the lungs, made the patient anxious and nauseous every time he took the medication. Viridiana would like to know what does the doctor recommend. Please advise.

## 2024-07-16 ENCOUNTER — TELEPHONE (OUTPATIENT)
Age: 89
End: 2024-07-16

## 2024-07-16 ENCOUNTER — CLINICAL SUPPORT (OUTPATIENT)
Dept: PULMONOLOGY | Facility: CLINIC | Age: 89
End: 2024-07-16
Payer: MEDICARE

## 2024-07-16 DIAGNOSIS — J44.9 CHRONIC OBSTRUCTIVE PULMONARY DISEASE, UNSPECIFIED COPD TYPE (HCC): Primary | ICD-10-CM

## 2024-07-16 PROCEDURE — G0239 OTH RESP PROC, GROUP: HCPCS

## 2024-07-16 NOTE — PROGRESS NOTES
Pulmonary Rehabilitation Plan of Care   Reassessment      Today's date: 2024   # of Exercise Sessions Completed: 32  Patient name: Severiano Feliciano      : 1935  Age: 88 y.o.       MRN: [id number]  Referring Physician: Buddy Hayward MD  Pulmonologist: Buddy Hayward MD    Provider: [place]  Clinician: Erin Gabriel MS, CEP    Dx:    Encounter Diagnosis   Name Primary?    Chronic obstructive pulmonary disease, unspecified COPD type (HCC) Yes     Date of onset: 2024      SUMMARY OF PROGRESS: Severiano continues to attend pulmonary rehab 2x/week, with 4 more sessions remaining until discharge. He continues to be limited by his lack of hearing and often does not wear his hearing aids. He also continues to have some slight confusion, along with the language barrier. He tolerates 40-45 mins of exercise at 2.1-2.2 METs on 4LO2. Normal resting HR with appropriate response to exercise. Resting BP occ elevated 132//98. Resting SpO2 91-93% with slight decrease 89-93% during exercise. He does not exercise at home and continues to be limited around the house, having family do most ADLs and some self care for him. He will be prepared for discharge in the next few sessions.   Patient specific goals include: become more independent and move more at home.    Medication compliance: Yes   Comments: Pt reports to be compliant with medications    Fall Risk: High   Comments: Patient uses walking assist device (walker/cane/rollator) and Denies a fall in the past 6 months    Smoking: Former user  Quit 20 years ago    Pulmonary Disease Risk Factors:  Occupational exposure to workplace  fumes    RPD at Rest: 1-2/10  RPD with Exercise: 2-3/10    Assessment of progression of lung disease and functional status:  CAT: not assessed  Shortness of breath questionnaire: 54/120      EXERCISE ASSESSMENT and PLAN    Current Exercise Program in Rehab:       Frequency: 2 days/week   Supplement with home exercise  2+ days/wk as tolerated        Minutes: 40-45         METS: 2.1-2.2              SpO2: 89-93%              RPD: 2-4                      HR: 78-93   RPE: 4-5         Modalities: UBE, NuStep, Recumbent bike, and Room walking      Exercise Progression 30 Day Goals :    Frequency: 2 days/week    Supplement with home exercise 2+ days/wk as tolerated       Minutes: 40-45        METS: 2.5-3.2              SpO2: >88%              RPD: 4-6                      HR:    RPE: 4-5        Modalities: UBE, NuStep, Recumbent bike, and Room walking     Strength trainin-3 days / week  12-15 repetitions  1-2 sets per modality    Modalities: Sit to Stands, Calf Raises, and leg extensions  - has not resumed weight lifting since return to pulmonary rehab    Home Exercise: none    Education: pursed lipped breathing, fall prevention while using nasal canula tubing, relaxation breathing, benefits of exercise for pulmonary disease, RPD scale, O2 saturation monitoring, and appropriate O2 response to exercise    SMART Goals:   improved 6MWT distance, reduced dyspnea during exercise, improved exercise tolerance based on peak METs tolerated in pulmonary rehab exercise session, and SpO2 >88% during exercise    Patient Specific EXERCISE GOALS:   (home exercise, ADLs): reduced dependence on supplemental O2, attend pulmonary rehab regularly, decrease sitting time at home, start a walking program, reduced pulmonary related hospital readmissions , increased muscular strength, increased energy, increased stamina with ADLs, and more independence at home    Patient's progress toward SMART and personal goals: has returned to 40-45 mins of exercise in pulmonary rehab; minimal home activity    Patient's goals for next 30 days: continue pulmonary rehab exercise 2x/week with increased intensity as tolerated, resume weight training; develop home exercise plan for post discharge    Plan: Titrate supplemental oxygen as needed to maintain SpO2>88% with  exercise, reduce time sitting at home, and utilize PLB with physical activity    Readiness to change: Action:  (Changing behavior)      NUTRITION ASSESSMENT AND PLAN    Weight control:    Starting weight: 137 lbs   Current weight: 133.4 lbs    Diabetes: N/A    SMART Goals:   eat 6 or more servings of grain products per day, eat 5 or more servings of fruits and vegetables a day, rarely eat processed meats or eat low fat processed meats, Do not eat fried foods, choose healthy snacks such as fruit, pretzels, and low fat crackers, choose healthy desserts and sweets such as patrice food cake or  fruit, choose low sugar desserts and sweets, and drink less than 8oz of soda and sweetened drinks per day      Patient Specific NUTRITION GOALS:  (wt control, diabetes management, dietary modifications): improve hydration increased muscle mass    Patient's progress toward SMART and personal goals: has lost 4 lbs without intention    Patient's goals for next 30 days: reduce sweets and increase healthy fats for healthy weight gain and muscle mass, resume weight training in pulmonary rehab    Measurable goals were based Rate Your Plate Dietary Self-Assessment. These are the areas in which the patient could score higher on the assessment.  Goals include recommendations for a heart healthy diet based on American Heart Association.    Education: heart healthy eating principles    Plan: replace refined flours with whole grains, increase daily intake of fruits and vegetables, increase daily intake of low fat dairy, eat healthy snacks like fruit, pretzels, and low fat crackers, and choose desserts such as fruit, patrice food cake, low-fat or fat-free sweets    Readiness to change: Preparation:  (Getting ready to change)       PSYCHOSOCIAL ASSESSMENT AND PLAN    Emotional:  Depression assessment:  PHQ-9 = 9  5-9 = Mild Depression            Anxiety measure:  LILA-7 = 6  5-9 = Mild anxiety    Assessment of depression and anxiety    Patient's son  denies anxiety and depression on patient's behalf    Self-reported stress level:  2  Stress Management: Practice Relaxation Techniques, Exercise, and Enjoy a hobby    Patient's rating of Social support: Very Good   Social Support Network: children, grandchildren, and spouse    Psychosocial Assessment as it relates to rehabilitation: Patient denies issues with his/her family or home life that may affect their rehabilitation efforts.       SMART Goals:    PHQ-9 - reduced severity by one level, Physical Fitness in [facility] Score < 3, Daily Activity in [facility] Score < 3, Pain in [facility] Score < 3, Overall Health in [facility] Score < 3, Quality of Life in [facility] Score < 3 , Change in Health in [facility] Score < 3 ,  Increased interest and pleasure in doing things, and feel less tired with more energy    Patient Specific PSYCHOCOSOCIAL GOALS:  (stress, emotional well being, social support): spend time with family    Patient's progress toward SMART and personal goals: enjoys coming to rehab     Patient's goals for next 30 days: increase home activity, increased independence at home as tolerated      Education: benefits of a positive support system, stress management techniques, and depression and pulmonary disease    Plan: Exercise, Reduce dependence  on family, Return to previous social activity, and Avoid triggers    Readiness to change: Action:  (Changing behavior)      OTHER CORE COMPONENTS     Tobacco:   Social History     Tobacco Use   Smoking Status Former    Current packs/day: 0.00    Average packs/day: 1 pack/day for 35.0 years (35.0 ttl pk-yrs)    Types: Cigarettes    Start date:     Quit date:     Years since quittin.5   Smokeless Tobacco Never       Tobacco Use Intervention: Referral to tobacco expert:   Pt quit 20 years ago   and has abstained    Blood pressure:    Restin//98    Oxygen needs:   Rest:  supplemental O2 via nasal cannula @ 3L/min   Exercise/physical activity:   supplemental O2 via nasal cannula @ 3-4L/min   Sleep:   supplemental O2 via nasal cannula @ 3L/min     Oxygen Goal: Maintain SpO2>88% during exercise    SMART Goals: reduced dietary sodium <2000mg and medication compliance    Patient Specific CORE COMPONENT GOALS (smoking, BP control, angina control, medication compliance): Take inhalers properly to ensure he is receiving the medication    Patient's progress toward SMART and personal goals: Variable resting BP; wife reports 100% medication compliance- manages them for patient    Patient's goals for next 30 days: Continue to utilize supplemental O2 at 3L with exercise and increase to 4L to maintain SpO2 >88%; ensure 100% medication compliance.     Education:  pathophysiology of pulmonary disease, inspiratory muscle training, and education: Pulmonary Anatomy and Physiology    Plan: medication compliance, engage in regular exercise, and monitor home BP    Readiness to change: Action:  (Changing behavior)

## 2024-07-18 ENCOUNTER — CLINICAL SUPPORT (OUTPATIENT)
Dept: PULMONOLOGY | Facility: CLINIC | Age: 89
End: 2024-07-18
Payer: MEDICARE

## 2024-07-18 DIAGNOSIS — J44.9 CHRONIC OBSTRUCTIVE PULMONARY DISEASE, UNSPECIFIED COPD TYPE (HCC): Primary | ICD-10-CM

## 2024-07-18 PROCEDURE — G0239 OTH RESP PROC, GROUP: HCPCS

## 2024-07-23 ENCOUNTER — CLINICAL SUPPORT (OUTPATIENT)
Dept: PULMONOLOGY | Facility: CLINIC | Age: 89
End: 2024-07-23
Payer: MEDICARE

## 2024-07-23 DIAGNOSIS — J44.9 CHRONIC OBSTRUCTIVE PULMONARY DISEASE, UNSPECIFIED COPD TYPE (HCC): Primary | ICD-10-CM

## 2024-07-23 PROCEDURE — G0239 OTH RESP PROC, GROUP: HCPCS

## 2024-07-25 ENCOUNTER — CLINICAL SUPPORT (OUTPATIENT)
Dept: PULMONOLOGY | Facility: CLINIC | Age: 89
End: 2024-07-25
Payer: MEDICARE

## 2024-07-25 DIAGNOSIS — J44.9 CHRONIC OBSTRUCTIVE PULMONARY DISEASE, UNSPECIFIED COPD TYPE (HCC): Primary | ICD-10-CM

## 2024-07-25 PROCEDURE — G0239 OTH RESP PROC, GROUP: HCPCS

## 2024-07-27 PROBLEM — A41.9 SEPSIS (HCC): Status: RESOLVED | Noted: 2024-06-27 | Resolved: 2024-07-27

## 2024-07-29 PROBLEM — J06.9 VIRAL URI WITH COUGH: Status: RESOLVED | Noted: 2018-07-18 | Resolved: 2024-07-29

## 2024-08-01 ENCOUNTER — CLINICAL SUPPORT (OUTPATIENT)
Dept: PULMONOLOGY | Facility: CLINIC | Age: 89
End: 2024-08-01
Payer: MEDICARE

## 2024-08-01 DIAGNOSIS — J44.9 CHRONIC OBSTRUCTIVE PULMONARY DISEASE, UNSPECIFIED COPD TYPE (HCC): Primary | ICD-10-CM

## 2024-08-01 PROCEDURE — G0239 OTH RESP PROC, GROUP: HCPCS

## 2024-08-01 NOTE — PROGRESS NOTES
Pulmonary Rehabilitation Plan of Care   DISCHARGE        Today's date: 2024   # of Exercise Sessions Completed: 36  Patient name: Severiano Feliciano      : 1935  Age: 88 y.o.       MRN: [id number]  Referring Physician: Buddy Hayward MD  Pulmonologist: Buddy Hayward MD    Provider: [place]  Clinician: Erin Gabriel MS, CEP    Dx:    Encounter Diagnosis   Name Primary?    Chronic obstructive pulmonary disease, unspecified COPD type (HCC) Yes     Date of onset: 2024      SUMMARY OF PROGRESS: Discharge note for Severiano. He has completed 36 sessions of pulmonary rehab, showing improvement in functional capacity. His 6MWT distance improved by 10.4% with less SOB reported during the walk. Severiano's son states that he is going to try and bring him to [place] with him to continue his exercise. He was encouraged to continue to provide direct supervision for Severiano during his workout, as he is a fall risk and has recurring confusion.   He tolerates 40-50 mins of exercise at 2.1-2.3 METs on 3-4LO2. Normal resting HR with appropriate response to exercise. Resting BP occ elevated 130//90. Resting SpO2 91-96% with slight decrease 88-95% during exercise. He does not exercise at home and continues to be limited around the house, having family do most ADLs and some self care for him. Patient specific goals include: become more independent and move more at home.    Medication compliance: Yes   Comments: Pt reports to be compliant with medications    Fall Risk: High   Comments: Patient uses walking assist device (walker/cane/rollator) and Denies a fall in the past 6 months    Smoking: Former user  Quit 20 years ago    Pulmonary Disease Risk Factors:  Occupational exposure to workplace  fumes    RPD at Rest: 0-210  RPD with Exercise: 2-10    Assessment of progression of lung disease and functional status:  CAT: not assessed; final   Shortness of breath questionnaire:  66/120      EXERCISE ASSESSMENT and PLAN    Current Exercise Program in Rehab:       Frequency: 2 days/week   Supplement with home exercise 2+ days/wk as tolerated        Minutes: 40-50         METS: 2.1-2.3              SpO2: 88-95%              RPD: 2-4                      HR: 78-98   RPE: 4-5         Modalities: UBE, NuStep, Recumbent bike, and Room walking      Exercise Progression after Discharge:    Frequency: 3-5 days of gym or home exercise   Minutes: 30-50  >150 mins/wk of moderate intensity exercise   METS: 2.0 - 3.0   HR: 70 - 100    RPE: 4-6   Modalities: UBE, NuStep, Recumbent bike, and home walking       Home Exercise: none    Education: pursed lipped breathing, fall prevention while using nasal canula tubing, relaxation breathing, benefits of exercise for pulmonary disease, RPD scale, O2 saturation monitoring, and appropriate O2 response to exercise    SMART Goals:   improved 6MWT distance, reduced dyspnea during exercise, improved exercise tolerance based on peak METs tolerated in pulmonary rehab exercise session, and SpO2 >88% during exercise    Patient Specific EXERCISE GOALS:   (home exercise, ADLs): reduced dependence on supplemental O2, attend pulmonary rehab regularly, decrease sitting time at home, start a walking program, reduced pulmonary related hospital readmissions , increased muscular strength, increased energy, increased stamina with ADLs, and more independence at home    Patient's progress toward SMART and personal goals: Completed pulmonary rehab with increased 6MWT distance and functional capacity  Patient's goals for next 30 days: attend the gym 2x/week with his grandson    Plan: Titrate supplemental oxygen as needed to maintain SpO2>88% with exercise, reduce time sitting at home, and utilize PLB with physical activity    Readiness to change: Maintenance: (Maintaining the behavior change)      NUTRITION ASSESSMENT AND PLAN    Weight control:    Starting weight: 137 lbs   Current  weight: 130.8 lbs    Diabetes: N/A    SMART Goals:   eat 6 or more servings of grain products per day, eat 5 or more servings of fruits and vegetables a day, rarely eat processed meats or eat low fat processed meats, Do not eat fried foods, choose healthy snacks such as fruit, pretzels, and low fat crackers, choose healthy desserts and sweets such as patrice food cake or  fruit, choose low sugar desserts and sweets, and drink less than 8oz of soda and sweetened drinks per day      Patient Specific NUTRITION GOALS:  (wt control, diabetes management, dietary modifications): improve hydration increased muscle mass    Patient's progress toward SMART and personal goals: has lost 8 lbs without intention    Patient's goals for next 30 days: reduce sweets and increase healthy fats for healthy weight gain and muscle mass  Measurable goals were based Rate Your Plate Dietary Self-Assessment. These are the areas in which the patient could score higher on the assessment.  Goals include recommendations for a heart healthy diet based on American Heart Association.    Education: heart healthy eating principles    Plan: replace refined flours with whole grains, increase daily intake of fruits and vegetables, increase daily intake of low fat dairy, eat healthy snacks like fruit, pretzels, and low fat crackers, and choose desserts such as fruit, patrice food cake, low-fat or fat-free sweets    Readiness to change: Action:  (Changing behavior)      PSYCHOSOCIAL ASSESSMENT AND PLAN    Emotional:  Depression assessment:  PHQ-9 = 9  5-9 = Mild Depression            Anxiety measure:  LILA-7 = 6  5-9 = Mild anxiety    Assessment of depression and anxiety    Patient's son denies anxiety and depression on patient's behalf    Self-reported stress level:  2  Stress Management: Practice Relaxation Techniques, Exercise, and Enjoy a hobby    Patient's rating of Social support: Very Good   Social Support Network: children, grandchildren, and  spouse    Psychosocial Assessment as it relates to rehabilitation: Patient denies issues with his/her family or home life that may affect their rehabilitation efforts.       SMART Goals:    PHQ-9 - reduced severity by one level, Physical Fitness in [place] Score < 3, Daily Activity in [facility] Score < 3, Pain in [place] Score < 3, Overall Health in [facility] Score < 3, Quality of Life in [place] Score < 3 , Change in Health in [facility] Score < 3 ,  Increased interest and pleasure in doing things, and feel less tired with more energy    Patient Specific PSYCHOCOSOCIAL GOALS:  (stress, emotional well being, social support): spend time with family    Patient's progress toward SMART and personal goals: no concerns in this area at this kaila    Patient's goals for next 30 days: increase home activity, increased independence at home as tolerated  e    Education: benefits of a positive support system, stress management techniques, and depression and pulmonary disease    Plan: Exercise, Reduce dependence  on family, Return to previous social activity, and Avoid triggers    Readiness to change: Maintenance: (Maintaining the behavior change)      OTHER CORE COMPONENTS     Tobacco:   Social History     Tobacco Use   Smoking Status Former    Current packs/day: 0.00    Average packs/day: 1 pack/day for 35.0 years (35.0 ttl pk-yrs)    Types: Cigarettes    Start date:     Quit date:     Years since quittin.5   Smokeless Tobacco Never       Tobacco Use Intervention: Referral to tobacco expert:   Pt quit 20 years ago   and has abstained    Blood pressure:    Restin//90    Oxygen needs:   Rest:  supplemental O2 via nasal cannula @ 3L/min   Exercise/physical activity:  supplemental O2 via nasal cannula @ 3-4L/min   Sleep:   supplemental O2 via nasal cannula @ 3L/min     Oxygen Goal: Maintain SpO2>88% during exercise    SMART Goals: reduced dietary sodium <2000mg and medication compliance    Patient  Specific CORE COMPONENT GOALS (smoking, BP control, angina control, medication compliance): Take inhalers properly to ensure he is receiving the medication    Patient's progress toward SMART and personal goals: Variable resting BP; wife reports 100% medication compliance- manages them for patient    Patient's goals for next 30 days: Continue to utilize supplemental O2 at 3L with exercise and increase to 4L to maintain SpO2 >88%; ensure 100% medication compliance.     Education:  pathophysiology of pulmonary disease, inspiratory muscle training, and education: Pulmonary Anatomy and Physiology    Plan: medication compliance, engage in regular exercise, and monitor home BP    Readiness to change: Maintenance: (Maintaining the behavior change)

## 2024-08-08 ENCOUNTER — APPOINTMENT (EMERGENCY)
Dept: CT IMAGING | Facility: HOSPITAL | Age: 89
DRG: 189 | End: 2024-08-08
Payer: MEDICARE

## 2024-08-08 ENCOUNTER — HOSPITAL ENCOUNTER (INPATIENT)
Facility: HOSPITAL | Age: 89
LOS: 3 days | Discharge: HOME | DRG: 189 | End: 2024-08-11
Admitting: INTERNAL MEDICINE
Payer: MEDICARE

## 2024-08-08 DIAGNOSIS — J96.22 ACUTE ON CHRONIC RESPIRATORY FAILURE WITH HYPOXIA AND HYPERCAPNIA (HCC): ICD-10-CM

## 2024-08-08 DIAGNOSIS — J96.01 ACUTE HYPOXIC RESPIRATORY FAILURE (HCC): Primary | ICD-10-CM

## 2024-08-08 DIAGNOSIS — J96.21 ACUTE ON CHRONIC RESPIRATORY FAILURE WITH HYPOXIA AND HYPERCAPNIA (HCC): ICD-10-CM

## 2024-08-08 DIAGNOSIS — J44.9 COPD, SEVERE (HCC): ICD-10-CM

## 2024-08-08 DIAGNOSIS — G93.40 ACUTE ENCEPHALOPATHY: ICD-10-CM

## 2024-08-08 PROBLEM — R79.89 ELEVATED TROPONIN: Status: ACTIVE | Noted: 2024-08-08

## 2024-08-08 PROBLEM — R50.9 LOW GRADE FEVER: Status: ACTIVE | Noted: 2024-08-08

## 2024-08-08 LAB
2HR DELTA HS TROPONIN: -26 NG/L
4HR DELTA HS TROPONIN: -20 NG/L
ALBUMIN SERPL BCG-MCNC: 4.1 G/DL (ref 3.5–5)
ALP SERPL-CCNC: 68 U/L (ref 34–104)
ALT SERPL W P-5'-P-CCNC: 16 U/L (ref 7–52)
APTT PPP: 28 SECONDS (ref 23–34)
AST SERPL W P-5'-P-CCNC: 22 U/L (ref 13–39)
ATRIAL RATE: 83 BPM
ATRIAL RATE: 90 BPM
ATRIAL RATE: 95 BPM
BASE EX.OXY STD BLDV CALC-SCNC: 67.9 % (ref 60–80)
BASE EX.OXY STD BLDV CALC-SCNC: 95.7 % (ref 60–80)
BASE EXCESS BLDV CALC-SCNC: 15.1 MMOL/L
BASE EXCESS BLDV CALC-SCNC: 16.9 MMOL/L
BASOPHILS # BLD AUTO: 0.02 THOUSANDS/ÂΜL (ref 0–0.1)
BASOPHILS NFR BLD AUTO: 0 % (ref 0–1)
BILIRUB SERPL-MCNC: 0.58 MG/DL (ref 0.2–1)
BNP SERPL-MCNC: 51 PG/ML (ref 0–100)
BUN SERPL-MCNC: 16 MG/DL (ref 5–25)
CALCIUM SERPL-MCNC: 9.9 MG/DL (ref 8.4–10.2)
CARDIAC TROPONIN I PNL SERPL HS: 100 NG/L
CARDIAC TROPONIN I PNL SERPL HS: 106 NG/L
CARDIAC TROPONIN I PNL SERPL HS: 126 NG/L
CHLORIDE SERPL-SCNC: 92 MMOL/L (ref 96–108)
CO2 SERPL-SCNC: >45 MMOL/L (ref 21–32)
CREAT SERPL-MCNC: 0.77 MG/DL (ref 0.6–1.3)
EOSINOPHIL # BLD AUTO: 0.38 THOUSAND/ÂΜL (ref 0–0.61)
EOSINOPHIL NFR BLD AUTO: 3 % (ref 0–6)
ERYTHROCYTE [DISTWIDTH] IN BLOOD BY AUTOMATED COUNT: 12.5 % (ref 11.6–15.1)
FLUAV RNA RESP QL NAA+PROBE: NEGATIVE
FLUBV RNA RESP QL NAA+PROBE: NEGATIVE
GFR SERPL CREATININE-BSD FRML MDRD: 82 ML/MIN/1.73SQ M
GLUCOSE SERPL-MCNC: 128 MG/DL (ref 65–140)
HCO3 BLDV-SCNC: 41.3 MMOL/L (ref 24–30)
HCO3 BLDV-SCNC: 46.1 MMOL/L (ref 24–30)
HCT VFR BLD AUTO: 38.4 % (ref 36.5–49.3)
HGB BLD-MCNC: 11 G/DL (ref 12–17)
IMM GRANULOCYTES # BLD AUTO: 0.04 THOUSAND/UL (ref 0–0.2)
IMM GRANULOCYTES NFR BLD AUTO: 0 % (ref 0–2)
INR PPP: 1.01 (ref 0.85–1.19)
LACTATE SERPL-SCNC: 1.1 MMOL/L (ref 0.5–2)
LYMPHOCYTES # BLD AUTO: 0.9 THOUSANDS/ÂΜL (ref 0.6–4.47)
LYMPHOCYTES NFR BLD AUTO: 8 % (ref 14–44)
MCH RBC QN AUTO: 31.3 PG (ref 26.8–34.3)
MCHC RBC AUTO-ENTMCNC: 28.6 G/DL (ref 31.4–37.4)
MCV RBC AUTO: 109 FL (ref 82–98)
MONOCYTES # BLD AUTO: 1.14 THOUSAND/ÂΜL (ref 0.17–1.22)
MONOCYTES NFR BLD AUTO: 10 % (ref 4–12)
NEUTROPHILS # BLD AUTO: 9.48 THOUSANDS/ÂΜL (ref 1.85–7.62)
NEUTS SEG NFR BLD AUTO: 79 % (ref 43–75)
NRBC BLD AUTO-RTO: 0 /100 WBCS
O2 CT BLDV-SCNC: 11.8 ML/DL
O2 CT BLDV-SCNC: 15.9 ML/DL
P AXIS: 77 DEGREES
P AXIS: 77 DEGREES
P AXIS: 84 DEGREES
PCO2 BLDV: 59 MM HG (ref 42–50)
PCO2 BLDV: 82.3 MM HG (ref 42–50)
PH BLDV: 7.37 [PH] (ref 7.3–7.4)
PH BLDV: 7.46 [PH] (ref 7.3–7.4)
PLATELET # BLD AUTO: 102 THOUSANDS/UL (ref 149–390)
PMV BLD AUTO: 9.1 FL (ref 8.9–12.7)
PO2 BLDV: 110.7 MM HG (ref 35–45)
PO2 BLDV: 36.5 MM HG (ref 35–45)
POTASSIUM SERPL-SCNC: 3.9 MMOL/L (ref 3.5–5.3)
PR INTERVAL: 148 MS
PR INTERVAL: 148 MS
PR INTERVAL: 158 MS
PROCALCITONIN SERPL-MCNC: 0.06 NG/ML
PROT SERPL-MCNC: 7.6 G/DL (ref 6.4–8.4)
PROTHROMBIN TIME: 13.5 SECONDS (ref 12.3–15)
QRS AXIS: 78 DEGREES
QRS AXIS: 85 DEGREES
QRS AXIS: 88 DEGREES
QRSD INTERVAL: 84 MS
QRSD INTERVAL: 88 MS
QRSD INTERVAL: 90 MS
QT INTERVAL: 348 MS
QT INTERVAL: 378 MS
QT INTERVAL: 378 MS
QTC INTERVAL: 437 MS
QTC INTERVAL: 444 MS
QTC INTERVAL: 462 MS
RBC # BLD AUTO: 3.52 MILLION/UL (ref 3.88–5.62)
RSV RNA RESP QL NAA+PROBE: NEGATIVE
SARS-COV-2 RNA RESP QL NAA+PROBE: NEGATIVE
SODIUM SERPL-SCNC: 145 MMOL/L (ref 135–147)
T WAVE AXIS: 74 DEGREES
T WAVE AXIS: 76 DEGREES
T WAVE AXIS: 79 DEGREES
VENTRICULAR RATE: 83 BPM
VENTRICULAR RATE: 90 BPM
VENTRICULAR RATE: 95 BPM
WBC # BLD AUTO: 11.96 THOUSAND/UL (ref 4.31–10.16)

## 2024-08-08 PROCEDURE — 99285 EMERGENCY DEPT VISIT HI MDM: CPT

## 2024-08-08 PROCEDURE — 92610 EVALUATE SWALLOWING FUNCTION: CPT

## 2024-08-08 PROCEDURE — 0241U HB NFCT DS VIR RESP RNA 4 TRGT: CPT

## 2024-08-08 PROCEDURE — 96368 THER/DIAG CONCURRENT INF: CPT

## 2024-08-08 PROCEDURE — 85730 THROMBOPLASTIN TIME PARTIAL: CPT

## 2024-08-08 PROCEDURE — 94760 N-INVAS EAR/PLS OXIMETRY 1: CPT

## 2024-08-08 PROCEDURE — 96365 THER/PROPH/DIAG IV INF INIT: CPT

## 2024-08-08 PROCEDURE — 80053 COMPREHEN METABOLIC PANEL: CPT

## 2024-08-08 PROCEDURE — 70450 CT HEAD/BRAIN W/O DYE: CPT

## 2024-08-08 PROCEDURE — 99223 1ST HOSP IP/OBS HIGH 75: CPT | Performed by: INTERNAL MEDICINE

## 2024-08-08 PROCEDURE — 94640 AIRWAY INHALATION TREATMENT: CPT

## 2024-08-08 PROCEDURE — 85610 PROTHROMBIN TIME: CPT

## 2024-08-08 PROCEDURE — 94664 DEMO&/EVAL PT USE INHALER: CPT

## 2024-08-08 PROCEDURE — 83605 ASSAY OF LACTIC ACID: CPT

## 2024-08-08 PROCEDURE — 82805 BLOOD GASES W/O2 SATURATION: CPT

## 2024-08-08 PROCEDURE — 96367 TX/PROPH/DG ADDL SEQ IV INF: CPT

## 2024-08-08 PROCEDURE — 83880 ASSAY OF NATRIURETIC PEPTIDE: CPT

## 2024-08-08 PROCEDURE — 84145 PROCALCITONIN (PCT): CPT

## 2024-08-08 PROCEDURE — 96375 TX/PRO/DX INJ NEW DRUG ADDON: CPT

## 2024-08-08 PROCEDURE — 93010 ELECTROCARDIOGRAM REPORT: CPT

## 2024-08-08 PROCEDURE — 84484 ASSAY OF TROPONIN QUANT: CPT

## 2024-08-08 PROCEDURE — 99291 CRITICAL CARE FIRST HOUR: CPT

## 2024-08-08 PROCEDURE — 94002 VENT MGMT INPAT INIT DAY: CPT

## 2024-08-08 PROCEDURE — 74177 CT ABD & PELVIS W/CONTRAST: CPT

## 2024-08-08 PROCEDURE — 93005 ELECTROCARDIOGRAM TRACING: CPT

## 2024-08-08 PROCEDURE — 71275 CT ANGIOGRAPHY CHEST: CPT

## 2024-08-08 PROCEDURE — 87040 BLOOD CULTURE FOR BACTERIA: CPT

## 2024-08-08 PROCEDURE — 85025 COMPLETE CBC W/AUTO DIFF WBC: CPT

## 2024-08-08 PROCEDURE — 94762 N-INVAS EAR/PLS OXIMTRY CONT: CPT

## 2024-08-08 PROCEDURE — 36415 COLL VENOUS BLD VENIPUNCTURE: CPT

## 2024-08-08 RX ORDER — VANCOMYCIN HYDROCHLORIDE 1 G/200ML
15 INJECTION, SOLUTION INTRAVENOUS ONCE
Status: COMPLETED | OUTPATIENT
Start: 2024-08-08 | End: 2024-08-08

## 2024-08-08 RX ORDER — PANTOPRAZOLE SODIUM 40 MG/1
40 TABLET, DELAYED RELEASE ORAL
Status: DISCONTINUED | OUTPATIENT
Start: 2024-08-08 | End: 2024-08-11 | Stop reason: HOSPADM

## 2024-08-08 RX ORDER — IPRATROPIUM BROMIDE AND ALBUTEROL SULFATE 2.5; .5 MG/3ML; MG/3ML
3 SOLUTION RESPIRATORY (INHALATION)
Status: DISCONTINUED | OUTPATIENT
Start: 2024-08-08 | End: 2024-08-08

## 2024-08-08 RX ORDER — ASPIRIN 81 MG/1
324 TABLET, CHEWABLE ORAL ONCE
Status: COMPLETED | OUTPATIENT
Start: 2024-08-08 | End: 2024-08-08

## 2024-08-08 RX ORDER — ACETAMINOPHEN 10 MG/ML
1000 INJECTION, SOLUTION INTRAVENOUS ONCE
Status: COMPLETED | OUTPATIENT
Start: 2024-08-08 | End: 2024-08-08

## 2024-08-08 RX ORDER — SIMETHICONE 80 MG
80 TABLET,CHEWABLE ORAL 4 TIMES DAILY PRN
Status: DISCONTINUED | OUTPATIENT
Start: 2024-08-08 | End: 2024-08-11 | Stop reason: HOSPADM

## 2024-08-08 RX ORDER — MAGNESIUM HYDROXIDE/ALUMINUM HYDROXICE/SIMETHICONE 120; 1200; 1200 MG/30ML; MG/30ML; MG/30ML
30 SUSPENSION ORAL EVERY 6 HOURS PRN
Status: DISCONTINUED | OUTPATIENT
Start: 2024-08-08 | End: 2024-08-11 | Stop reason: HOSPADM

## 2024-08-08 RX ORDER — ACETAMINOPHEN 325 MG/1
650 TABLET ORAL EVERY 4 HOURS PRN
Status: DISCONTINUED | OUTPATIENT
Start: 2024-08-08 | End: 2024-08-11 | Stop reason: HOSPADM

## 2024-08-08 RX ORDER — TORSEMIDE 10 MG/1
5 TABLET ORAL DAILY
Status: DISCONTINUED | OUTPATIENT
Start: 2024-08-08 | End: 2024-08-11 | Stop reason: HOSPADM

## 2024-08-08 RX ORDER — FORMOTEROL FUMARATE DIHYDRATE 20 UG/2ML
20 SOLUTION RESPIRATORY (INHALATION)
Status: DISCONTINUED | OUTPATIENT
Start: 2024-08-08 | End: 2024-08-11 | Stop reason: HOSPADM

## 2024-08-08 RX ORDER — BISACODYL 10 MG
10 SUPPOSITORY, RECTAL RECTAL DAILY PRN
Status: DISCONTINUED | OUTPATIENT
Start: 2024-08-08 | End: 2024-08-11 | Stop reason: HOSPADM

## 2024-08-08 RX ORDER — ALBUTEROL SULFATE 0.83 MG/ML
5 SOLUTION RESPIRATORY (INHALATION) ONCE
Status: COMPLETED | OUTPATIENT
Start: 2024-08-08 | End: 2024-08-08

## 2024-08-08 RX ORDER — GUAIFENESIN/DEXTROMETHORPHAN 100-10MG/5
10 SYRUP ORAL EVERY 4 HOURS PRN
Status: DISCONTINUED | OUTPATIENT
Start: 2024-08-08 | End: 2024-08-08

## 2024-08-08 RX ORDER — GUAIFENESIN 600 MG/1
600 TABLET, EXTENDED RELEASE ORAL EVERY 12 HOURS SCHEDULED
Status: DISCONTINUED | OUTPATIENT
Start: 2024-08-08 | End: 2024-08-11 | Stop reason: HOSPADM

## 2024-08-08 RX ORDER — BUDESONIDE 0.5 MG/2ML
0.5 INHALANT ORAL 2 TIMES DAILY
Status: DISCONTINUED | OUTPATIENT
Start: 2024-08-08 | End: 2024-08-11 | Stop reason: HOSPADM

## 2024-08-08 RX ORDER — AMOXICILLIN 250 MG
1 CAPSULE ORAL DAILY
Status: DISCONTINUED | OUTPATIENT
Start: 2024-08-08 | End: 2024-08-11 | Stop reason: HOSPADM

## 2024-08-08 RX ORDER — LANOLIN ALCOHOL/MO/W.PET/CERES
9 CREAM (GRAM) TOPICAL
Status: DISCONTINUED | OUTPATIENT
Start: 2024-08-08 | End: 2024-08-11 | Stop reason: HOSPADM

## 2024-08-08 RX ORDER — IPRATROPIUM BROMIDE AND ALBUTEROL SULFATE 2.5; .5 MG/3ML; MG/3ML
3 SOLUTION RESPIRATORY (INHALATION)
Status: DISCONTINUED | OUTPATIENT
Start: 2024-08-08 | End: 2024-08-11 | Stop reason: HOSPADM

## 2024-08-08 RX ORDER — METHYLPREDNISOLONE SODIUM SUCCINATE 40 MG/ML
40 INJECTION, POWDER, LYOPHILIZED, FOR SOLUTION INTRAMUSCULAR; INTRAVENOUS EVERY 8 HOURS SCHEDULED
Status: DISCONTINUED | OUTPATIENT
Start: 2024-08-08 | End: 2024-08-09

## 2024-08-08 RX ORDER — METHYLPREDNISOLONE SODIUM SUCCINATE 125 MG/2ML
125 INJECTION, POWDER, LYOPHILIZED, FOR SOLUTION INTRAMUSCULAR; INTRAVENOUS ONCE
Status: COMPLETED | OUTPATIENT
Start: 2024-08-08 | End: 2024-08-08

## 2024-08-08 RX ORDER — LOSARTAN POTASSIUM 25 MG/1
25 TABLET ORAL 2 TIMES DAILY
Status: DISCONTINUED | OUTPATIENT
Start: 2024-08-08 | End: 2024-08-11 | Stop reason: HOSPADM

## 2024-08-08 RX ORDER — HEPARIN SODIUM 5000 [USP'U]/ML
5000 INJECTION, SOLUTION INTRAVENOUS; SUBCUTANEOUS EVERY 8 HOURS SCHEDULED
Status: DISCONTINUED | OUTPATIENT
Start: 2024-08-08 | End: 2024-08-11 | Stop reason: HOSPADM

## 2024-08-08 RX ORDER — ALBUTEROL SULFATE 90 UG/1
2 AEROSOL, METERED RESPIRATORY (INHALATION) EVERY 6 HOURS PRN
Status: DISCONTINUED | OUTPATIENT
Start: 2024-08-08 | End: 2024-08-11 | Stop reason: HOSPADM

## 2024-08-08 RX ADMIN — IPRATROPIUM BROMIDE AND ALBUTEROL SULFATE 3 ML: 2.5; .5 SOLUTION RESPIRATORY (INHALATION) at 12:57

## 2024-08-08 RX ADMIN — ALBUTEROL SULFATE 5 MG: 2.5 SOLUTION RESPIRATORY (INHALATION) at 02:54

## 2024-08-08 RX ADMIN — IPRATROPIUM BROMIDE AND ALBUTEROL SULFATE 3 ML: 2.5; .5 SOLUTION RESPIRATORY (INHALATION) at 16:55

## 2024-08-08 RX ADMIN — IPRATROPIUM BROMIDE 0.5 MG: 0.5 SOLUTION RESPIRATORY (INHALATION) at 02:54

## 2024-08-08 RX ADMIN — FORMOTEROL FUMARATE 20 MCG: 20 SOLUTION RESPIRATORY (INHALATION) at 19:31

## 2024-08-08 RX ADMIN — CEFEPIME 2000 MG: 2 INJECTION, POWDER, FOR SOLUTION INTRAVENOUS at 23:53

## 2024-08-08 RX ADMIN — HEPARIN SODIUM 5000 UNITS: 5000 INJECTION INTRAVENOUS; SUBCUTANEOUS at 21:14

## 2024-08-08 RX ADMIN — VANCOMYCIN HYDROCHLORIDE 1000 MG: 1 INJECTION, SOLUTION INTRAVENOUS at 01:49

## 2024-08-08 RX ADMIN — IPRATROPIUM BROMIDE AND ALBUTEROL SULFATE 3 ML: 2.5; .5 SOLUTION RESPIRATORY (INHALATION) at 19:31

## 2024-08-08 RX ADMIN — CEFEPIME 2000 MG: 2 INJECTION, POWDER, FOR SOLUTION INTRAVENOUS at 11:40

## 2024-08-08 RX ADMIN — HEPARIN SODIUM 5000 UNITS: 5000 INJECTION INTRAVENOUS; SUBCUTANEOUS at 13:27

## 2024-08-08 RX ADMIN — BUDESONIDE 0.5 MG: 0.5 INHALANT ORAL at 19:31

## 2024-08-08 RX ADMIN — GUAIFENESIN 600 MG: 600 TABLET, EXTENDED RELEASE ORAL at 21:14

## 2024-08-08 RX ADMIN — ASPIRIN 81 MG CHEWABLE TABLET 162 MG: 81 TABLET CHEWABLE at 02:10

## 2024-08-08 RX ADMIN — LOSARTAN POTASSIUM 25 MG: 25 TABLET, FILM COATED ORAL at 21:14

## 2024-08-08 RX ADMIN — METHYLPREDNISOLONE SODIUM SUCCINATE 40 MG: 40 INJECTION, POWDER, FOR SOLUTION INTRAMUSCULAR; INTRAVENOUS at 21:14

## 2024-08-08 RX ADMIN — CEFEPIME 2000 MG: 2 INJECTION, POWDER, FOR SOLUTION INTRAVENOUS at 01:17

## 2024-08-08 RX ADMIN — IPRATROPIUM BROMIDE AND ALBUTEROL SULFATE 3 ML: 2.5; .5 SOLUTION RESPIRATORY (INHALATION) at 07:23

## 2024-08-08 RX ADMIN — ACETAMINOPHEN 1000 MG: 10 INJECTION INTRAVENOUS at 01:15

## 2024-08-08 RX ADMIN — METHYLPREDNISOLONE SODIUM SUCCINATE 125 MG: 125 INJECTION, POWDER, FOR SOLUTION INTRAMUSCULAR; INTRAVENOUS at 01:09

## 2024-08-08 RX ADMIN — ALBUTEROL SULFATE 5 MG: 2.5 SOLUTION RESPIRATORY (INHALATION) at 01:06

## 2024-08-08 RX ADMIN — HEPARIN SODIUM 5000 UNITS: 5000 INJECTION INTRAVENOUS; SUBCUTANEOUS at 06:14

## 2024-08-08 RX ADMIN — IOHEXOL 100 ML: 350 INJECTION, SOLUTION INTRAVENOUS at 03:57

## 2024-08-08 RX ADMIN — IPRATROPIUM BROMIDE 0.5 MG: 0.5 SOLUTION RESPIRATORY (INHALATION) at 01:06

## 2024-08-08 RX ADMIN — BUDESONIDE 0.5 MG: 0.5 INHALANT ORAL at 07:22

## 2024-08-08 RX ADMIN — METHYLPREDNISOLONE SODIUM SUCCINATE 40 MG: 40 INJECTION, POWDER, FOR SOLUTION INTRAMUSCULAR; INTRAVENOUS at 13:27

## 2024-08-08 NOTE — ED NOTES
Pt alert and responsive at this time. RN attempted to give remaining two aspirin tablets but Pt is having difficulty chewing the tablet. Tablet removed from mouth by this RN. Provider made aware and is okay with pt not receiving last two aspirin tablets.        Chris Villegas RN  08/08/24 [id number]

## 2024-08-08 NOTE — ASSESSMENT & PLAN NOTE
Patient presented due to AMS. Frequent readmissions for COPD exacerbation and respiratory failure.  History of GOLD E COPD and chronic respiratory failure maintained on 2-3LNC and nocturnal CPAP.  Per outpatient pulmonology note, offered palliative care although patient declined  PTA maintained on Budesonide BID and Duonebs TID with PRN albuterol in between as needed, Supplemental oxygen: none at rest, 3L with exertion. Nocturnal NIPPV.  Pulmonary rehab.  CT neg for PE.   Right lower lobe airspace consolidation with air bronchograms, similar to slightly increased since prior study, which may be due to pneumonia, likely aspiration pneumonia given the mucous/debris in the trachea.   Recommend short-term follow-up noncontrast chest CT in 3 months to assess for resolution after treatment  Continue budesonide, add DuoNeb qid, PRN albuterol, and IV solumedrol Goal O2 sat 88% or above  Continue BIPAP at bedtime and as needed during the day  SLP consulted  Aspiration precautions  Pulmonary consulted appreciate recommendation

## 2024-08-08 NOTE — ASSESSMENT & PLAN NOTE
Presented with low-grade fever 100.5.  WBC 11.9.  Did not meet SIRS criteria  Likely secondary to aspiration pneumonitis   Normal PCT x2 discontinue iv antibiotics   UA pending collection  Blood cultures negative   Afebrile since admission

## 2024-08-08 NOTE — H&P
[facility]  H&P  Name: Severiano Feliciano 88 y.o. male I MRN: [id number]  Unit/Bed#: ED-10 I Date of Admission: 8/8/2024   Date of Service: 8/8/2024 I Hospital Day: 0      Assessment & Plan   * Acute on chronic respiratory failure with hypoxia and hypercapnia (HCC)  Assessment & Plan  Frequent readmissions for COPD exacerbation and respiratory failure.  History of GOLD E COPD and chronic respiratory failure maintained on 2-3LNC and nocturnal CPAP.  Per outpatient pulmonology note, offered palliative care although patient declined  Arrived requiring BiPAP.  Now on NRB  PTA maintained on Budesonide BID and Duonebs TID with PRN albuterol in between as needed, Supplemental oxygen: none at rest, 3L with exertion. Nocturnal NIPPV.  Pulmonary rehab.  CT neg for PE.   Right lower lobe airspace consolidation with air bronchograms, similar to slightly increased since prior study, which may be due to pneumonia, likely aspiration pneumonia given the mucous/debris in the trachea.   Recommend short-term follow-up noncontrast chest CT in 3 months to assess for resolution after treatment  Continue budesonide, add DuoNeb qid, PRN albuterol.  Goal O2 sat 88% or above  SLP consult  Aspiration precautions  Pulmonary consult    Elevated troponin  Assessment & Plan  Troponin 126-->100,  likely demand ischemia from respiratory failure  EKG negative for acute ischemic changes  Received aspirin bolus in ED  Trend troponin EKG x3  Monitor on telemetry    COPD, severe (HCC)  Assessment & Plan  History of Gold E COPD. PTA maintained on Budesonide BID and Duonebs TID with PRN albuterol in between as needed, Supplemental oxygen: none at rest, 3L with exertion. Nocturnal NIPPV.  Pulmonary rehab.  See respiratory failure above    Acute metabolic encephalopathy  Assessment & Plan  Encephalopathy in setting of hypoxia and hypercapnia.  Previous admissions with same presentation  Hypercapnia on VBG with normal pH  CT head  negative for acute intracranial abnormality  Improving     Low grade fever  Assessment & Plan  Low-grade fever 100.5.  WBC 11.9.  Does not meet SIRS criteria  Concern for pneumonia vs aspiration PNA on CT  Normal PCT  UA pending collection  Monitor off antibiotics  Trend CBC  Monitor fever curve  Follow blood cultures    Bilateral hearing loss  Assessment & Plan  Follows with ENT.  Noncompliant with use of hearing aids    Benign essential hypertension  Assessment & Plan  Losartan and torsemide    History of DVT (deep vein thrombosis)  Assessment & Plan  DVT ppx with SQ heparin         VTE Prophylaxis: Heparin  / sequential compression device   Code Status: FC by default  POLST: POLST is not applicable to this patient  Discussion with family:     Anticipated Length of Stay:  Patient will be admitted on an Inpatient basis with an anticipated length of stay of  > 2 midnights.   Justification for Hospital Stay: Hypoxic and hypercarbic respiratory failure, Gold E COPD    Total Time for Visit, including Counseling / Coordination of Care: 60 minutes.  Greater than 50% of this total time spent on direct patient counseling and coordination of care.    Chief Complaint:       History of Present Illness:    Severiano Feliciano is a 88 y.o. male who presents with respiratory failure requiring BiPAP.  Poor historian, unable to obtain HPI.    Review of Systems:    Review of Systems   Unable to perform ROS: Other       Past Medical and Surgical History:     Past Medical History:   Diagnosis Date    Acute metabolic encephalopathy 06/13/2020    Age-related cataract of right eye 04/04/2023    patient is medically cleared and presents with low risk of complication with this upcoming R cataract surgery.    Anemia     Aneurysm, aorta, thoracic (HCC)     Appendicolith     Ascending aortic aneurysm (HCC)     3.7    Asthma     BPH (benign prostatic hyperplasia)     CAD (coronary artery disease)     noted on CT scan    CHF (congestive heart  failure) (HCC)     COPD (chronic obstructive pulmonary disease) (HCC)     Descending thoracic aortic aneurysm (HCC)     Diabetes mellitus (HCC)     Diverticulosis     Former tobacco use     GERD (gastroesophageal reflux disease)     History of DVT (deep vein thrombosis)     Left leg    History of transfusion     Hypertension     Hypoxemia 04/30/2023    Inguinal hernia     right    Nephrolithiasis     Oxygen dependent     2LNC    Oxygen dependent     Pneumonia     Pre-diabetes     Prostate calculus     PVD (peripheral vascular disease) (HCC)     Recurrent right inguinal hernia w incarcertion 01/04/2023    Thoracic aortic aneurysm without rupture (HCC) 11/19/2018    Added automatically from request for surgery [id number]    Thrombocytopenia (HCC) 12/29/2018    Ulcer     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)     Varicose vein of leg     b/l       Past Surgical History:   Procedure Laterality Date    CARDIAC SURGERY      ESOPHAGOGASTRODUODENOSCOPY      HERNIA REPAIR Right 1/21/2019    Procedure: REPAIR HERNIA INGUINAL WITH MESH;  Surgeon: David Lowry MD;  Location:  MAIN OR;  Service: General    HERNIA REPAIR Right 7/22/2023    Procedure: REPAIR RECURRENT INCARERATED HERNIA INGUINAL OPEN, RIGHT ORCHIECTOMY;  Surgeon: Mason Bertrand MD;  Location: [facility];  Service: General    INGUINAL HERNIA REPAIR Bilateral     IR TEVAR  12/27/2018    [place] EVASC RPR DTA COVERAGE ART ORIGIN 1ST ENDOPROSTH N/A 12/27/2018    Procedure: TEVAR - endovascular thoracic aortic aneurysm repair;  Surgeon: Gladis Ortega MD;  Location: [facility];  Service: Vascular    THORACIC AORTIC ANEURYSM REPAIR  12/27/2018    VARICOSE VEIN SURGERY Bilateral     vein stripping       Meds/Allergies:    Prior to Admission medications    Medication Sig Start Date End Date Taking? Authorizing Provider   acetaminophen (TYLENOL) 650 mg CR tablet TAKE 1 TABLET (650 MG TOTAL) BY MOUTH EVERY 8 (EIGHT) HOURS AS NEEDED FOR MILD PAIN 7/9/24   Kirby  MD Jocelyne   albuterol (PROVENTIL HFA,VENTOLIN HFA) 90 mcg/act inhaler INHALE 2 PUFFS EVERY 6 (SIX) HOURS AS NEEDED FOR WHEEZING 6/21/24   Kirby Casanova MD   budesonide (Pulmicort) 0.5 mg/2 mL nebulizer solution Take 2 mL (0.5 mg total) by nebulization 2 (two) times a day Rinse mouth after use. 5/3/24 4/28/25  Buddy Hayward MD   formoterol (PERFOROMIST) 20 MCG/2ML nebulizer solution Take 2 mL (20 mcg total) by nebulization 2 (two) times a day 5/3/24 4/28/25  Buddy Hayward MD   ipratropium-albuterol (DUO-NEB) 0.5-2.5 mg/3 mL nebulizer solution Take 3 mL by nebulization 2 (two) times a day 5/3/24 4/28/25  Buddy Hayward MD   ketoconazole (NIZORAL) 2 % shampoo Apply 1 Application topically 2 (two) times a week 7/11/24   Kirby Casanova MD   losartan (COZAAR) 25 mg tablet TAKE ONE TABLET BY MOUTH TWICE DAILYTOMAR 1 TABLETA POR VIA ORAL DOS VECES AL EULALIO 3/6/24   Jessie Menendez PA-C   mometasone (ELOCON) 0.1 % lotion Apply topically daily 7/10/24   Kirby Casanova MD   pantoprazole (PROTONIX) 40 mg tablet TAKE 1 TABLET (40 MG TOTAL) BY MOUTH DAILY 5/27/24   Kirby Casanova MD   Senna Plus 8.6-50 MG per tablet TAKE 1 TABLET BY MOUTH DAILY 7/9/24   Kirby Casanova MD   torsemide (DEMADEX) 5 MG tablet Take 1 tablet (5 mg total) by mouth daily 7/10/24   Kirby Casanova MD     I have reviewed home medications using allscripts.    Allergies:   Allergies   Allergen Reactions    Penicillins Hives, Itching and Rash    Lisinopril Rash     Side pains and rash     Lasix [Furosemide] Other (See Comments)     unknown    Zyprexa [Olanzapine] Tongue Swelling       Social History:     Marital Status: /Civil Union   Occupation:   Patient Pre-hospital Living Situation:   Patient Pre-hospital Level of Mobility:   Patient Pre-hospital Diet Restrictions:   Substance Use History:   Social History     Substance and Sexual Activity   Alcohol Use Never     Social History     Tobacco Use   Smoking Status Former     Current packs/day: 0.00    Average packs/day: 1 pack/day for 35.0 years (35.0 ttl pk-yrs)    Types: Cigarettes    Start date:     Quit date:     Years since quittin.6   Smokeless Tobacco Never     Social History     Substance and Sexual Activity   Drug Use No       Family History:    Family History   Problem Relation Age of Onset    Tuberculosis Mother     No Known Problems Father     Cancer Sister     Diabetes Family     Hypertension Family        Physical Exam:     Vitals:   Blood Pressure: 143/65 (24 0500)  Pulse: 88 (24 0500)  Temperature: 98.1 °F (36.7 °C) (24 0412)  Temp Source: Oral (24 0412)  Respirations: 22 (24 0500)  SpO2: 100 % (24 050)    Physical Exam  Constitutional:       General: He is not in acute distress.     Appearance: He is ill-appearing. He is not toxic-appearing or diaphoretic.      Comments: Underweight.  Chronically ill-appearing   HENT:      Head: Normocephalic and atraumatic.      Mouth/Throat:      Mouth: Mucous membranes are dry.   Eyes:      Conjunctiva/sclera: Conjunctivae normal.   Cardiovascular:      Rate and Rhythm: Normal rate and regular rhythm.      Heart sounds: Normal heart sounds.   Pulmonary:      Effort: Pulmonary effort is normal.      Comments: NRB  Diminished lung sounds.  Tachypnea  Abdominal:      General: There is no distension.      Palpations: Abdomen is soft.      Tenderness: There is no abdominal tenderness.      Comments: Decreased bowel sounds   Musculoskeletal:      Right lower leg: No edema.      Left lower leg: No edema.   Skin:     General: Skin is warm.      Coloration: Skin is pale.   Neurological:      Mental Status: He is alert.   Psychiatric:      Comments: Somnolent       Additional Data:     Lab Results: I have personally reviewed pertinent reports.      Results from last 7 days   Lab Units 24  0059   WBC Thousand/uL 11.96*   HEMOGLOBIN g/dL 11.0*   HEMATOCRIT % 38.4   PLATELETS Thousands/uL  102*   SEGS PCT % 79*   LYMPHO PCT % 8*   MONO PCT % 10   EOS PCT % 3     Results from last 7 days   Lab Units 08/08/24  0059   SODIUM mmol/L 145   POTASSIUM mmol/L 3.9   CHLORIDE mmol/L 92*   CO2 mmol/L >45*   BUN mg/dL 16   CREATININE mg/dL 0.77   CALCIUM mg/dL 9.9   ALBUMIN g/dL 4.1   TOTAL BILIRUBIN mg/dL 0.58   ALK PHOS U/L 68   ALT U/L 16   AST U/L 22   GLUCOSE RANDOM mg/dL 128     Results from last 7 days   Lab Units 08/08/24  0059   INR  1.01             Results from last 7 days   Lab Units 08/08/24  0059   LACTIC ACID mmol/L 1.1   PROCALCITONIN ng/ml 0.06       Imaging: I have personally reviewed pertinent reports.      CT head without contrast   Final Result by Cat Bonner MD (08/08 5769)      No acute intracranial abnormality.  Stable chronic microangiopathic changes within the brain.      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      Near complete opacification of the right mastoid air cells, increased since prior study.      Workstation performed: [id number]         PE Study with CT Abdomen and Pelvis with contrast   Final Result by Cat Bonner MD (08/08 6783)      No pulmonary embolism.      Right lower lobe airspace consolidation with air bronchograms, similar to slightly increased since prior study, which may be due to pneumonia, likely aspiration pneumonia given the mucous/debris in the trachea. Recommend short-term follow-up noncontrast    chest CT in 3 months to assess for resolution after treatment.      No acute findings in the abdomen or pelvis.      Stable aneurysmal dilatation of the aortic arch. 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      The study was marked in EPIC for immediate notification.      Workstation performed: [id number]             EKG, Pathology, and Other Studies Reviewed on Admission:   Ct     Allscripts / Epic Records Reviewed: Yes     ** Please Note: This note has been  constructed using a voice recognition system. **

## 2024-08-08 NOTE — ASSESSMENT & PLAN NOTE
Encephalopathy in setting of hypoxia and hypercapnia.  Previous admissions with same presentation  Hypercapnia on VBG improving   CT head negative for acute intracranial abnormality  Improving

## 2024-08-08 NOTE — PLAN OF CARE
Problem: Prexisting or High Potential for Compromised Skin Integrity  Goal: Skin integrity is maintained or improved  Description: INTERVENTIONS:  - Identify patients at risk for skin breakdown  - Assess and monitor skin integrity  - Assess and monitor nutrition and hydration status  - Monitor labs   - Assess for incontinence   - Turn and reposition patient  - Assist with mobility/ambulation  - Relieve pressure over bony prominences  - Avoid friction and shearing  - Provide appropriate hygiene as needed including keeping skin clean and dry  - Evaluate need for skin moisturizer/barrier cream  - Collaborate with interdisciplinary team   - Patient/family teaching  - Consider wound care consult   Outcome: Progressing     Problem: PAIN - ADULT  Goal: Verbalizes/displays adequate comfort level or baseline comfort level  Description: Interventions:  - Encourage patient to monitor pain and request assistance  - Assess pain using appropriate pain scale  - Administer analgesics based on type and severity of pain and evaluate response  - Implement non-pharmacological measures as appropriate and evaluate response  - Consider cultural and social influences on pain and pain management  - Notify physician/advanced practitioner if interventions unsuccessful or patient reports new pain  Outcome: Progressing     Problem: INFECTION - ADULT  Goal: Absence or prevention of progression during hospitalization  Description: INTERVENTIONS:  - Assess and monitor for signs and symptoms of infection  - Monitor lab/diagnostic results  - Monitor all insertion sites, i.e. indwelling lines, tubes, and drains  - Monitor endotracheal if appropriate and nasal secretions for changes in amount and color  - Marion appropriate cooling/warming therapies per order  - Administer medications as ordered  - Instruct and encourage patient and family to use good hand hygiene technique  - Identify and instruct in appropriate isolation precautions for  identified infection/condition  Outcome: Progressing  Goal: Absence of fever/infection during neutropenic period  Description: INTERVENTIONS:  - Monitor WBC    Outcome: Progressing     Problem: SAFETY ADULT  Goal: Patient will remain free of falls  Description: INTERVENTIONS:  - Educate patient/family on patient safety including physical limitations  - Instruct patient to call for assistance with activity   - Consult OT/PT to assist with strengthening/mobility   - Keep Call bell within reach  - Keep bed low and locked with side rails adjusted as appropriate  - Keep care items and personal belongings within reach  - Initiate and maintain comfort rounds  - Make Fall Risk Sign visible to staff  - Offer Toileting every 2 Hours, in advance of need  - Initiate/Maintain bed alarm  - Obtain necessary fall risk management equipment: In place   - Apply yellow socks and bracelet for high fall risk patients  - Consider moving patient to room near nurses station  Outcome: Progressing  Goal: Maintain or return to baseline ADL function  Description: INTERVENTIONS:  -  Assess patient's ability to carry out ADLs; assess patient's baseline for ADL function and identify physical deficits which impact ability to perform ADLs (bathing, care of mouth/teeth, toileting, grooming, dressing, etc.)  - Assess/evaluate cause of self-care deficits   - Assess range of motion  - Assess patient's mobility; develop plan if impaired  - Assess patient's need for assistive devices and provide as appropriate  - Encourage maximum independence but intervene and supervise when necessary  - Involve family in performance of ADLs  - Assess for home care needs following discharge   - Consider OT consult to assist with ADL evaluation and planning for discharge  - Provide patient education as appropriate  Outcome: Progressing  Goal: Maintains/Returns to pre admission functional level  Description: INTERVENTIONS:  - Perform AM-PAC 6 Click Basic Mobility/ Daily  Activity assessment daily.  - Set and communicate daily mobility goal to care team and patient/family/caregiver.   - Collaborate with rehabilitation services on mobility goals if consulted  - Perform Range of Motion 3 times a day.  - Reposition patient every 2 hours.  - Dangle patient 3 times a day  - Stand patient 3 times a day  - Ambulate patient 3 times a day  - Out of bed to chair 3 times a day   - Out of bed for meals 3 times a day  - Out of bed for toileting  - Record patient progress and toleration of activity level   Outcome: Progressing     Problem: DISCHARGE PLANNING  Goal: Discharge to home or other facility with appropriate resources  Description: INTERVENTIONS:  - Identify barriers to discharge w/patient and caregiver  - Arrange for needed discharge resources and transportation as appropriate  - Identify discharge learning needs (meds, wound care, etc.)  - Arrange for interpretive services to assist at discharge as needed  - Refer to Case Management Department for coordinating discharge planning if the patient needs post-hospital services based on physician/advanced practitioner order or complex needs related to functional status, cognitive ability, or social support system  Outcome: Progressing     Problem: Knowledge Deficit  Goal: Patient/family/caregiver demonstrates understanding of disease process, treatment plan, medications, and discharge instructions  Description: Complete learning assessment and assess knowledge base.  Interventions:  - Provide teaching at level of understanding  - Provide teaching via preferred learning methods  Outcome: Progressing     Problem: Nutrition/Hydration-ADULT  Goal: Nutrient/Hydration intake appropriate for improving, restoring or maintaining nutritional needs  Description: Monitor and assess patient's nutrition/hydration status for malnutrition. Collaborate with interdisciplinary team and initiate plan and interventions as ordered.  Monitor patient's weight and  dietary intake as ordered or per policy. Utilize nutrition screening tool and intervene as necessary. Determine patient's food preferences and provide high-protein, high-caloric foods as appropriate.     INTERVENTIONS:  - Monitor oral intake, urinary output, labs, and treatment plans  - Assess nutrition and hydration status and recommend course of action  - Evaluate amount of meals eaten  - Assist patient with eating if necessary   - Allow adequate time for meals  - Recommend/ encourage appropriate diets, oral nutritional supplements, and vitamin/mineral supplements  - Order, calculate, and assess calorie counts as needed  - Recommend, monitor, and adjust tube feedings and TPN/PPN based on assessed needs  - Assess need for intravenous fluids  - Provide specific nutrition/hydration education as appropriate  - Include patient/family/caregiver in decisions related to nutrition  Outcome: Progressing

## 2024-08-08 NOTE — ASSESSMENT & PLAN NOTE
Troponin 126-->100--> 106,  likely demand ischemia from respiratory failure  EKG negative for acute ischemic changes  Received aspirin bolus in ED

## 2024-08-08 NOTE — SEPSIS NOTE
Sepsis Note   Severiano Feliciano 88 y.o. male MRN: [id number]  Unit/Bed#: ED-10 Encounter: [id number]       Initial Sepsis Screening       Row Name 08/08/24 0110 08/08/24 0109             Is the patient's history suggestive of a new or worsening infection? -- Yes (Proceed)  -GJ       Suspected source of infection -- pneumonia  -GJ       Indicate SIRS criteria Tachypnea > 20 resp per min  -GJ Tachycardia > 90 bpm  -GJ       Are two or more of the above signs & symptoms of infection both present and new to the patient? Yes (Proceed)  -GJ --       Assess for evidence of organ dysfunction: Are any of the below criteria present within 6 hours of suspected infection and SIRS criteria that are NOT considered to be chronic conditions? -- --                 User Key  (r) = Recorded By, (t) = Taken By, (c) = Cosigned By      Initials Name Provider Type    SENA Monet DO Physician                        There is no height or weight on file to calculate BMI.  Wt Readings from Last 1 Encounters:   07/10/24 59 kg (130 lb)        Ideal body weight: 56.9 kg (125 lb 7.1 oz)  Adjusted ideal body weight: 57.7 kg (127 lb 4.2 oz)

## 2024-08-08 NOTE — ASSESSMENT & PLAN NOTE
Low-grade fever 100.5.  WBC 11.9.  Does not meet SIRS criteria  Concern for pneumonia vs aspiration PNA on CT  Normal PCT  UA pending collection  Monitor off antibiotics  Trend CBC  Monitor fever curve  Follow blood cultures

## 2024-08-08 NOTE — CONSULTS
Consult Note - Pulmonary   Severiano Feliciano 88 y.o. male MRN: [id number]  Unit/Bed#: E4 -01 Encounter: [id number]      Reason for consultation: Acute on chronic respiratory failure with hypoxia and hypercapnia    Requesting provider:   RAQUEL Guerrero      Assessment & Recommendations:     Acute on chronic respiratory failure with hypoxia and hypercapnia  Has a history of chronic respiratory failure due to severe COPD and is mostly on 3.5-4 L  Has been following up with pulmonary medicine outpatient  COPD with possible acute exacerbation  Received 125 mg of IV Solu-Medrol initially, as well as nebulizers and antibiotics  Has a history of severe COPD with severely obstructive flow-volume  Possible pneumonia  Initially patient had WBC 11.96, initial procalcitonin negative  Sleep apnea  Continue NIPPV nightly, can use BiPAP as needed    Plan:  IV Solu-Medrol 40 mg Q8 for now  Continue DuoNeb, Pulmicort  Continue respiratory protocol and airway clearance protocol  Use BiPAP at night and as needed during the day  Continue antibiotics for now      History of Present Illness   HPI:  Severiano Feliciano is a 88 y.o. male with past medical history of severe COPD, chronic respiratory failure on 2-3 L, uses CPAP at night, hypertension, history of DVT who came in because of worsening respiratory failure and metabolic encephalopathy.  On labs he was found to have hypercapnia on VBG, BNP was negative, CTA PE was done and it showed no evidence of PE, but showed right lower lobe consolidation with air bronchograms increased compared to previous imaging.  On physical exam he had tachypnea, respiratory distress and rhonchi and was somnolent.  Early in the morning required 15 L nonrebreather, required BiPAP and now is down to 6 L nasal cannula.     He has been following up with pulmonary office, at home he takes budesonide twice daily and DuoNebs 3 times daily with as needed albuterol as needed in between, also has  Perforomist nebulizer, Pulmicort nebulizer.    So far received 125 mg IV Solu-Medrol, albuterol nebulizer, Pulmicort, Atrovent, DuoNeb, also received vancomycin and cefepime.    Review of systems:  12 point review of systems was completed and was otherwise negative except as listed in HPI.      Historical Information   Past Medical History:   Diagnosis Date    Acute metabolic encephalopathy 06/13/2020    Age-related cataract of right eye 04/04/2023    patient is medically cleared and presents with low risk of complication with this upcoming R cataract surgery.    Anemia     Aneurysm, aorta, thoracic (HCC)     Appendicolith     Ascending aortic aneurysm (HCC)     3.7    Asthma     BPH (benign prostatic hyperplasia)     CAD (coronary artery disease)     noted on CT scan    CHF (congestive heart failure) (HCC)     COPD (chronic obstructive pulmonary disease) (HCC)     Descending thoracic aortic aneurysm (HCC)     Diabetes mellitus (HCC)     Diverticulosis     Former tobacco use     GERD (gastroesophageal reflux disease)     History of DVT (deep vein thrombosis)     Left leg    History of transfusion     Hypertension     Hypoxemia 04/30/2023    Inguinal hernia     right    Nephrolithiasis     Oxygen dependent     2LNC    Oxygen dependent     Pneumonia     Pre-diabetes     Prostate calculus     PVD (peripheral vascular disease) (HCC)     Recurrent right inguinal hernia w incarcertion 01/04/2023    Thoracic aortic aneurysm without rupture (HCC) 11/19/2018    Added automatically from request for surgery [id number]    Thrombocytopenia (HCC) 12/29/2018    Ulcer     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)     Varicose vein of leg     b/l     Past Surgical History:   Procedure Laterality Date    CARDIAC SURGERY      ESOPHAGOGASTRODUODENOSCOPY      HERNIA REPAIR Right 1/21/2019    Procedure: REPAIR HERNIA INGUINAL WITH MESH;  Surgeon: David Lowry MD;  Location:  MAIN OR;  Service: General    HERNIA REPAIR  Right 7/22/2023    Procedure: REPAIR RECURRENT INCARERATED HERNIA INGUINAL OPEN, RIGHT ORCHIECTOMY;  Surgeon: Mason Bertrand MD;  Location[facility];  Service: General    INGUINAL HERNIA REPAIR Bilateral     IR TEVAR  12/27/2018    CT EVASC RPR DTA COVERAGE ART ORIGIN 1ST ENDOPROSTH N/A 12/27/2018    Procedure: TEVAR - endovascular thoracic aortic aneurysm repair;  Surgeon: Gladis Ortega MD;  [facility]: [facility];  Service: Vascular    THORACIC AORTIC ANEURYSM REPAIR  12/27/2018    VARICOSE VEIN SURGERY Bilateral     vein stripping     Family History   Problem Relation Age of Onset    Tuberculosis Mother     No Known Problems Father     Cancer Sister     Diabetes Family     Hypertension Family      Meds/Allergies   Current Facility-Administered Medications   Medication Dose Route Frequency    acetaminophen (TYLENOL) tablet 650 mg  650 mg Oral Q4H PRN    albuterol (PROVENTIL HFA,VENTOLIN HFA) inhaler 2 puff  2 puff Inhalation Q6H PRN    aluminum-magnesium hydroxide-simethicone (MAALOX) oral suspension 30 mL  30 mL Oral Q6H PRN    bisacodyl (DULCOLAX) rectal suppository 10 mg  10 mg Rectal Daily PRN    budesonide (PULMICORT) inhalation solution 0.5 mg  0.5 mg Nebulization BID    ceFEPime (MAXIPIME) 2,000 mg in dextrose 5 % 50 mL IVPB  2,000 mg Intravenous Q12H    guaiFENesin (MUCINEX) 12 hr tablet 600 mg  600 mg Oral Q12H KIKE    heparin (porcine) subcutaneous injection 5,000 Units  5,000 Units Subcutaneous Q8H KIKE    ipratropium-albuterol (DUO-NEB) 0.5-2.5 mg/3 mL inhalation solution 3 mL  3 mL Nebulization 4x Daily    losartan (COZAAR) tablet 25 mg  25 mg Oral BID    pantoprazole (PROTONIX) EC tablet 40 mg  40 mg Oral Early Morning    senna-docusate sodium (SENOKOT S) 8.6-50 mg per tablet 1 tablet  1 tablet Oral Daily    simethicone (MYLICON) chewable tablet 80 mg  80 mg Oral 4x Daily PRN    torsemide (DEMADEX) tablet 5 mg  5 mg Oral Daily       Medications Prior to Admission:     acetaminophen (TYLENOL) 650 mg CR  tablet    albuterol (PROVENTIL HFA,VENTOLIN HFA) 90 mcg/act inhaler    budesonide (Pulmicort) 0.5 mg/2 mL nebulizer solution    formoterol (PERFOROMIST) 20 MCG/2ML nebulizer solution    ipratropium-albuterol (DUO-NEB) 0.5-2.5 mg/3 mL nebulizer solution    ketoconazole (NIZORAL) 2 % shampoo    losartan (COZAAR) 25 mg tablet    mometasone (ELOCON) 0.1 % lotion    pantoprazole (PROTONIX) 40 mg tablet    Senna Plus 8.6-50 MG per tablet    torsemide (DEMADEX) 5 MG tablet  Allergies   Allergen Reactions    Penicillins Hives, Itching and Rash    Lisinopril Rash     Side pains and rash     Lasix [Furosemide] Other (See Comments)     unknown    Zyprexa [Olanzapine] Tongue Swelling       Vitals: Blood pressure 129/67, pulse 79, temperature 98.1 °F (36.7 °C), temperature source Temporal, resp. rate 20, SpO2 95%., On 6LPM, There is no height or weight on file to calculate BMI.      Intake/Output Summary (Last 24 hours) at 8/8/2024 1059  Last data filed at 8/8/2024 0256  Gross per 24 hour   Intake 350 ml   Output --   Net 350 ml       Physical Exam  Physical Exam  Vitals and nursing note reviewed.   Constitutional:       General: He is not in acute distress.     Appearance: He is well-developed. He is ill-appearing.      Comments: Sleepy but arousable, falls back to sleep shortly   HENT:      Head: Normocephalic and atraumatic.   Eyes:      Conjunctiva/sclera: Conjunctivae normal.   Cardiovascular:      Rate and Rhythm: Normal rate and regular rhythm.      Heart sounds: No murmur heard.  Pulmonary:      Effort: Pulmonary effort is normal.      Breath sounds: Wheezing (Some bilateral wheezes) present.      Comments: Decreased sounds bilaterally  Abdominal:      Palpations: Abdomen is soft.      Tenderness: There is no abdominal tenderness.   Musculoskeletal:         General: No swelling.      Cervical back: Neck supple.   Skin:     General: Skin is warm and dry.      Capillary Refill: Capillary refill takes less than 2 seconds.    Psychiatric:         Mood and Affect: Mood normal.           Labs: I have personally reviewed pertinent lab results.  Laboratory and Diagnostics  Results from last 7 days   Lab Units 08/08/24  0059   WBC Thousand/uL 11.96*   HEMOGLOBIN g/dL 11.0*   HEMATOCRIT % 38.4   PLATELETS Thousands/uL 102*   SEGS PCT % 79*   MONO PCT % 10   EOS PCT % 3     Results from last 7 days   Lab Units 08/08/24  0059   SODIUM mmol/L 145   POTASSIUM mmol/L 3.9   CHLORIDE mmol/L 92*   CO2 mmol/L >45*   BUN mg/dL 16   CREATININE mg/dL 0.77   CALCIUM mg/dL 9.9   GLUCOSE RANDOM mg/dL 128   ALT U/L 16   AST U/L 22   ALK PHOS U/L 68   ALBUMIN g/dL 4.1   TOTAL BILIRUBIN mg/dL 0.58          Results from last 7 days   Lab Units 08/08/24  0059   INR  1.01   PTT seconds 28          Results from last 7 days   Lab Units 08/08/24  0059   LACTIC ACID mmol/L 1.1                     Results from last 7 days   Lab Units 08/08/24  0059   PROCALCITONIN ng/ml 0.06       ABG:       MICRO  Flu/RSV/COVID-negative, blood cultures pending    Imaging and other studies: I have personally reviewed pertinent reports.      CT head without contrast   Final Result by Cat Bonner MD (08/08 7829)      No acute intracranial abnormality.  Stable chronic microangiopathic changes within the brain.      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      Near complete opacification of the right mastoid air cells, increased since prior study.      Workstation performed: [id number]         PE Study with CT Abdomen and Pelvis with contrast   Final Result by Cat Bonner MD (08/08 7811)      No pulmonary embolism.      Right lower lobe airspace consolidation with air bronchograms, similar to slightly increased since prior study, which may be due to pneumonia, likely aspiration pneumonia given the mucous/debris in the trachea. Recommend short-term follow-up noncontrast    chest CT in 3 months to assess for resolution  after treatment.      No acute findings in the abdomen or pelvis.      Stable aneurysmal dilatation of the aortic arch. 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      The study was marked in EPIC for immediate notification.      Workstation performed: [id number]              Pulmonary function testing:     PFT 3/2024  Spirometry:  FEV1/FVC Ratio is 42%.  FEV1 is 0.54 L, 25% predicted.  FVC is 1.29 L, 44% predicted.  Severely obstructive loop    EKG, Pathology, and Other Studies: I have personally reviewed pertinent reports.      Code Status: Level 1 - Full Code      Gunnar Zacarias MD  [facility]

## 2024-08-08 NOTE — Clinical Note
Case was discussed with MONICO and the patient's admission status was agreed to be Admission Status: inpatient status to the service of Dr. Townsend

## 2024-08-08 NOTE — ASSESSMENT & PLAN NOTE
History of Gold E COPD. PTA maintained on Budesonide BID and Duonebs TID with PRN albuterol in between as needed, Supplemental oxygen: none at rest, 3L with exertion. Nocturnal NIPPV.  Pulmonary rehab.  See respiratory failure above

## 2024-08-08 NOTE — ASSESSMENT & PLAN NOTE
Frequent readmissions for COPD exacerbation and respiratory failure.  History of GOLD E COPD and chronic respiratory failure maintained on 2-3LNC and nocturnal CPAP.  Per outpatient pulmonology note, offered palliative care although patient declined  Arrived requiring BiPAP.  Now on NRB  PTA maintained on Budesonide BID and Duonebs TID with PRN albuterol in between as needed, Supplemental oxygen: none at rest, 3L with exertion. Nocturnal NIPPV.  Pulmonary rehab.  CT neg for PE.   Right lower lobe airspace consolidation with air bronchograms, similar to slightly increased since prior study, which may be due to pneumonia, likely aspiration pneumonia given the mucous/debris in the trachea.   Recommend short-term follow-up noncontrast chest CT in 3 months to assess for resolution after treatment  Continue budesonide, add DuoNeb qid, PRN albuterol.  Goal O2 sat 88% or above  SLP consult  Aspiration precautions  Pulmonary consult

## 2024-08-08 NOTE — ED NOTES
Prior to administration of medication this RN assessed pt alertness, pt responding to RN. This RN spoke with family at bedside which stated pt takes PO meds with no issues. Family encouraged this RN to give PO medication first before resorting to rectal medication. Provider consented to attempting PO medication. Pt removed from bipap and given two chewable aspirin tablets. Pt O2 began dropping, pt placed back on bipap. Provider made aware that pt did not receive full dose of aspirin. Will hold other two aspirin tablets for now.        Chris Villegas RN  08/08/24 [id number]

## 2024-08-08 NOTE — ASSESSMENT & PLAN NOTE
Troponin 126-->100,  likely demand ischemia from respiratory failure  EKG negative for acute ischemic changes  Received aspirin bolus in ED  Trend troponin EKG x3  Monitor on telemetry

## 2024-08-08 NOTE — RESPIRATORY THERAPY NOTE
Per nursing request assessed pt respiratory status  On 6 lpm sat = 77%    Placed on BiPAP with VT = less than 100 mls     Pt responded to chest rub with grunting only  eyes not opening on command  Placed pt on AVAPS mode settings Vt 500 Epap 4 rate 4  FiO2 60%  current VT = 528 sat = 98%    Nursing aware

## 2024-08-08 NOTE — SPEECH THERAPY NOTE
Speech Language/Pathology  Speech/Language Pathology  Assessment    Patient Name: Severiano Feliciano  Today's Date: 8/8/2024     Problem List  Principal Problem:    Acute on chronic respiratory failure with hypoxia and hypercapnia (HCC)  Active Problems:    History of DVT (deep vein thrombosis)    Benign essential hypertension    Acute metabolic encephalopathy    Bilateral hearing loss    COPD, severe (HCC)    Elevated troponin    Low grade fever    Past Medical History  Past Medical History:   Diagnosis Date    Acute metabolic encephalopathy 06/13/2020    Age-related cataract of right eye 04/04/2023    patient is medically cleared and presents with low risk of complication with this upcoming R cataract surgery.    Anemia     Aneurysm, aorta, thoracic (HCC)     Appendicolith     Ascending aortic aneurysm (HCC)     3.7    Asthma     BPH (benign prostatic hyperplasia)     CAD (coronary artery disease)     noted on CT scan    CHF (congestive heart failure) (HCC)     COPD (chronic obstructive pulmonary disease) (HCC)     Descending thoracic aortic aneurysm (HCC)     Diabetes mellitus (HCC)     Diverticulosis     Former tobacco use     GERD (gastroesophageal reflux disease)     History of DVT (deep vein thrombosis)     Left leg    History of transfusion     Hypertension     Hypoxemia 04/30/2023    Inguinal hernia     right    Nephrolithiasis     Oxygen dependent     2LNC    Oxygen dependent     Pneumonia     Pre-diabetes     Prostate calculus     PVD (peripheral vascular disease) (HCC)     Recurrent right inguinal hernia w incarcertion 01/04/2023    Thoracic aortic aneurysm without rupture (HCC) 11/19/2018    Added automatically from request for surgery [id number]    Thrombocytopenia (HCC) 12/29/2018    Ulcer     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)     Varicose vein of leg     b/l     Past Surgical History  Past Surgical History:   Procedure Laterality Date    CARDIAC SURGERY       "ESOPHAGOGASTRODUODENOSCOPY      HERNIA REPAIR Right 1/21/2019    Procedure: REPAIR HERNIA INGUINAL WITH MESH;  Surgeon: David Lowry MD;  Location:  MAIN OR;  Service: General    HERNIA REPAIR Right 7/22/2023    Procedure: REPAIR RECURRENT INCARERATED HERNIA INGUINAL OPEN, RIGHT ORCHIECTOMY;  Surgeon: Mason Bertrand MD;  Location[facility];  Service: General    INGUINAL HERNIA REPAIR Bilateral     IR TEVAR  12/27/2018    ME EVASC RPR DTA COVERAGE ART ORIGIN 1ST ENDOPROSTH N/A 12/27/2018    Procedure: TEVAR - endovascular thoracic aortic aneurysm repair;  Surgeon: Gladis Ortega MD;  [facility];  Service: Vascular    THORACIC AORTIC ANEURYSM REPAIR  12/27/2018    VARICOSE VEIN SURGERY Bilateral     vein stripping        Bedside Swallow Evaluation:    Summary:  Limited eval as pt was just finishing his lunch and refused to eat any more. Took multiple sips of liquids w/ prompt transfer and swallow. No cough or wet vocal quality. Denied difficulty w/ his current tray. Chopped meat and broccoli, mashed potatoes. Pt and family denied that he has difficulty eating at home and stated he eats \"anything\", regular food. Seen by our dept in the past and was on a regular diet showing no overt s/s of difficulty w/ PO. A GI consult was recommended in 2020 as the pt's son reported he vomits \"maybe once a month\". Noted esophageal diverticulum on a regular barium swallow/esophagram 2020. Oral stage wnl w/ liquids. Not seen w/ solids. No overt pharyngeal s/s. H/o esophageal diverticulum. Question risk for bottom up aspiration.     Recommendations:  Diet:Pt stating current diet ifs \"ok\". Dysphagia 3 dental soft  Liquid:thin  Meds:as tolerated  Supervision:prn  Positioning:Upright   Pt to take PO/Meds only when fully alert and upright.   Oral care  Aspiration precautions  Reflux precautions  Therapy Prognosis:? Close to or at baseline  Frequency: ? F/u x 1-2xs w/ larger solid sample, possible upgrade to regular vs " continue on current diet.   Consider esophagram/routine barium swallow w/ tablet administration if pt is able to stand for test.  (Assess diverticulum and function) vs egd.    Consider consult w/:  Rehab  GI ??  Nutrition    Goal(s):  Dysphagia LTG  -Patient will demonstrate safe and effective oral intake (without overt s/s significant oral/pharyngeal dysphagia including s/s penetration or aspiration) for the highest appropriate diet level.     1.Pt will tolerate least restrictive diet w/out s/s aspiration or oral/pharyngeal difficulties.   2.Pt will will effectively manipulate/masticate and transfer purees/solids w/out s/s dysphagia/aspiration.   3.Pt will tolerate thin liquids w/out s/s aspiration.   -If indicated, patient will comply with a Video/Modified Barium Swallow study for more complete assessment of swallowing anatomy/physiology/aspiration risk and to assess efficacy of treatment techniques so as to best guide treatment plan   H&P/Admit info/ pertinent provider notes: (PMH noted above)  8/8  Assessment/plan:  Acute on chronic respiratory failure with hypoxia and hypercapnia: Known end-stage COPD.  Will continue with BiPAP for now.  Repeat ABG this morning.  Continue with nebulizers and IV steroids.  Follow-up pulmonology recommendations.  Elevated troponin: Nonischemic myocardial injury in the setting of respiratory failure.  Monitor on telemetry but no further cardiac workup warranted at this time.  Encephalopathy: Likely secondary to persistent hypercapnia and respiratory failure.  Monitor with BiPAP treatment and treatment of his COPD.  Possible pneumonia: Procalcitonin negative though CT with evidence suggestive of pneumonia.  Will continue on IV cefepime.  Monitor WBC count and fever curve.  Hypertension: Monitor on home losartan and torsemide when able to take p.o.      Special Studies:  8/8 CTA chest  No pulmonary embolism.  Right lower lobe airspace consolidation with air bronchograms, similar to  slightly increased since prior study, which may be due to pneumonia, likely aspiration pneumonia given the mucous/debris in the trachea. Recommend short-term follow-up noncontrast   chest CT in 3 months to assess for resolution after treatment.  No acute findings in the abdomen or pelvis.  Stable aneurysmal dilatation of the aortic arch. 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    Esophagram:  6/16/20:  IMPRESSION:  No significant interval change.  Stable esophageal diverticulum at the level of the thoracic inlet. Remainder of the esophagus is otherwise normal in caliber.    Sodium 135-147mmol/L  145  8/8/24   Carbon Dioxide 21-32 mmol/L  >45 8/8     Procalcitonin<=0.25ng/ml  0.06  8/8   WBC  4.31-10.16 Thousand/uL   11.96  8/8     Previous MBS:  None. Last seen by  here 12/7/23 at which time he was tolerating a regular diet.     Patient's goal: wanted hot chocolate. I ordered it for him.     Did the pt report pain? no  If yes, was nursing notified/was it addressed? N/a    Reason for consult:  R/o aspiration  Determine safest and least restrictive diet    Precautions:  Fall (recently d/c'd from OP PT)    Food Allergies: none   Current Diet:  Dysphagia 3 dental soft   Premorbid diet:  Regular per ?grandson   O2 requirement:  3 NC   Social/Prior living  Home w/ spouse   Voice/Speech:  Wnl in [place]ian   Follows commands:  basic   Cognitive status:  Alert, makes needs known     Oral mech exam:  Dentition:edentulous  Lips (VII):+  Tongue (XII):+  Secretion management:wnl      Esophageal stage:  H/o GERD    Results d/w:  Pt, nursing, family

## 2024-08-08 NOTE — ASSESSMENT & PLAN NOTE
Encephalopathy in setting of hypoxia and hypercapnia.  Previous admissions with same presentation  Hypercapnia on VBG with normal pH  CT head negative for acute intracranial abnormality  Improving

## 2024-08-08 NOTE — ED PROVIDER NOTES
History  Chief Complaint   Patient presents with    Altered Mental Status     Per ems pt acting increasingly confused for last 2 days, has a cough and taking albuterol tx but no relief. Baseline 4L, 79% at 4L in triage. Per Ems pt has temp of 100.5 no meds given. Pt tachy in triage, not answering questions for this writer.      Patient is an 88-year-old male with a significant past medical history of CHF, COPD, diabetes, chronic DVT, presenting for evaluation of altered mental status and shortness of breath.  Patient is a poor historian despite the use of a .  Patient reportedly has been having some increased confusion appreciated by the family over the last 2 days or so.  He is reported to have a temperature measured at 100.5.  He has reportedly been having some increased work of breathing as well as some coughing.  Unclear as to what patient's baseline mental status is.  He is reportedly chronically on 4 L nasal cannula and was noted to be hypoxic to 79% when on his baseline home oxygen.  Patient unable to provide meaningful history.        Prior to Admission Medications   Prescriptions Last Dose Informant Patient Reported? Taking?   Senna Plus 8.6-50 MG per tablet   No No   Sig: TAKE 1 TABLET BY MOUTH DAILY   acetaminophen (TYLENOL) 650 mg CR tablet   No No   Sig: TAKE 1 TABLET (650 MG TOTAL) BY MOUTH EVERY 8 (EIGHT) HOURS AS NEEDED FOR MILD PAIN   albuterol (PROVENTIL HFA,VENTOLIN HFA) 90 mcg/act inhaler   No No   Sig: INHALE 2 PUFFS EVERY 6 (SIX) HOURS AS NEEDED FOR WHEEZING   budesonide (Pulmicort) 0.5 mg/2 mL nebulizer solution   No No   Sig: Take 2 mL (0.5 mg total) by nebulization 2 (two) times a day Rinse mouth after use.   formoterol (PERFOROMIST) 20 MCG/2ML nebulizer solution   No No   Sig: Take 2 mL (20 mcg total) by nebulization 2 (two) times a day   ipratropium-albuterol (DUO-NEB) 0.5-2.5 mg/3 mL nebulizer solution   No No   Sig: Take 3 mL by nebulization 2 (two) times a day   ketoconazole  (NIZORAL) 2 % shampoo   No No   Sig: Apply 1 Application topically 2 (two) times a week   losartan (COZAAR) 25 mg tablet   No No   Sig: TAKE ONE TABLET BY MOUTH TWICE DAILYTOMAR 1 TABLETA POR VIA ORAL DOS VECES AL EULALIO   mometasone (ELOCON) 0.1 % lotion   No No   Sig: Apply topically daily   pantoprazole (PROTONIX) 40 mg tablet   No No   Sig: TAKE 1 TABLET (40 MG TOTAL) BY MOUTH DAILY   torsemide (DEMADEX) 5 MG tablet   No No   Sig: Take 1 tablet (5 mg total) by mouth daily      Facility-Administered Medications: None       Past Medical History:   Diagnosis Date    Acute metabolic encephalopathy 06/13/2020    Age-related cataract of right eye 04/04/2023    patient is medically cleared and presents with low risk of complication with this upcoming R cataract surgery.    Anemia     Aneurysm, aorta, thoracic (HCC)     Appendicolith     Ascending aortic aneurysm (HCC)     3.7    Asthma     BPH (benign prostatic hyperplasia)     CAD (coronary artery disease)     noted on CT scan    CHF (congestive heart failure) (HCC)     COPD (chronic obstructive pulmonary disease) (HCC)     Descending thoracic aortic aneurysm (HCC)     Diabetes mellitus (HCC)     Diverticulosis     Former tobacco use     GERD (gastroesophageal reflux disease)     History of DVT (deep vein thrombosis)     Left leg    History of transfusion     Hypertension     Hypoxemia 04/30/2023    Inguinal hernia     right    Nephrolithiasis     Oxygen dependent     2LNC    Oxygen dependent     Pneumonia     Pre-diabetes     Prostate calculus     PVD (peripheral vascular disease) (HCC)     Recurrent right inguinal hernia w incarcertion 01/04/2023    Thoracic aortic aneurysm without rupture (HCC) 11/19/2018    Added automatically from request for surgery [id number]    Thrombocytopenia (HCC) 12/29/2018    Ulcer     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)     Varicose vein of leg     b/l       Past Surgical History:   Procedure Laterality Date    CARDIAC  SURGERY      ESOPHAGOGASTRODUODENOSCOPY      HERNIA REPAIR Right 2019    Procedure: REPAIR HERNIA INGUINAL WITH MESH;  Surgeon: David Lowry MD;  Location:  MAIN OR;  Service: General    HERNIA REPAIR Right 2023    Procedure: REPAIR RECURRENT INCARERATED HERNIA INGUINAL OPEN, RIGHT ORCHIECTOMY;  Surgeon: Mason Bertrand MD;  Location: [facility];  Service: General    INGUINAL HERNIA REPAIR Bilateral     IR TEVAR  2018    [place] EVASC RPR DTA COVERAGE ART ORIGIN 1ST ENDOPROSTH N/A 2018    Procedure: TEVAR - endovascular thoracic aortic aneurysm repair;  Surgeon: Gladis Ortega MD;  Location: [facility];  Service: Vascular    THORACIC AORTIC ANEURYSM REPAIR  2018    VARICOSE VEIN SURGERY Bilateral     vein stripping       Family History   Problem Relation Age of Onset    Tuberculosis Mother     No Known Problems Father     Cancer Sister     Diabetes Family     Hypertension Family      I have reviewed and agree with the history as documented.    E-Cigarette/Vaping    E-Cigarette Use Never User      E-Cigarette/Vaping Substances    Nicotine No     THC No     CBD No     Flavoring No     Other No     Unknown No      Social History     Tobacco Use    Smoking status: Former     Current packs/day: 0.00     Average packs/day: 1 pack/day for 35.0 years (35.0 ttl pk-yrs)     Types: Cigarettes     Start date:      Quit date:      Years since quittin.6    Smokeless tobacco: Never   Vaping Use    Vaping status: Never Used   Substance Use Topics    Alcohol use: Never    Drug use: No       Review of Systems   Unable to perform ROS: Mental status change       Physical Exam  Physical Exam  Constitutional:       General: He is in acute distress.      Appearance: He is ill-appearing.   HENT:      Head: Normocephalic and atraumatic.      Right Ear: External ear normal.      Left Ear: External ear normal.   Cardiovascular:      Rate and Rhythm: Normal rate and regular rhythm.      Pulses: Normal  pulses.   Pulmonary:      Effort: Tachypnea and respiratory distress present.      Breath sounds: Rhonchi present.   Abdominal:      General: Abdomen is flat.      Palpations: Abdomen is soft.      Tenderness: There is no abdominal tenderness.   Musculoskeletal:      Right lower leg: No edema.      Left lower leg: No edema.   Skin:     General: Skin is warm and dry.   Neurological:      General: No focal deficit present.      Comments: Patient not following commands despite use of          Vital Signs  ED Triage Vitals   Temperature Pulse Respirations Blood Pressure SpO2   08/08/24 0049 08/08/24 0049 08/08/24 0049 08/08/24 0049 08/08/24 0049   100 °F (37.8 °C) 102 22 (!) 161/107 94 %      Temp Source Heart Rate Source Patient Position - Orthostatic VS BP Location FiO2 (%)   08/08/24 0049 08/08/24 0049 08/08/24 0049 08/08/24 0049 08/08/24 0734   Oral Monitor Lying Right arm 60      Pain Score       --                  Vitals:    08/08/24 0400 08/08/24 0500 08/08/24 0616 08/08/24 0731   BP: 143/70 143/65 166/98 121/61   Pulse: 95 88 88 82   Patient Position - Orthostatic VS: Sitting Sitting Lying Lying         Visual Acuity      ED Medications  Medications   budesonide (PULMICORT) inhalation solution 0.5 mg ( Nebulization Canceled Entry 8/8/24 0900)   ipratropium-albuterol (DUO-NEB) 0.5-2.5 mg/3 mL inhalation solution 3 mL (3 mL Nebulization Given 8/8/24 0723)   albuterol (PROVENTIL HFA,VENTOLIN HFA) inhaler 2 puff (has no administration in time range)   losartan (COZAAR) tablet 25 mg (25 mg Oral Not Given 8/8/24 0812)   pantoprazole (PROTONIX) EC tablet 40 mg (40 mg Oral Not Given 8/8/24 0615)   senna-docusate sodium (SENOKOT S) 8.6-50 mg per tablet 1 tablet (1 tablet Oral Not Given 8/8/24 0812)   torsemide (DEMADEX) tablet 5 mg (5 mg Oral Not Given 8/8/24 0812)   acetaminophen (TYLENOL) tablet 650 mg (has no administration in time range)   bisacodyl (DULCOLAX) rectal suppository 10 mg (has no  administration in time range)   aluminum-magnesium hydroxide-simethicone (MAALOX) oral suspension 30 mL (has no administration in time range)   simethicone (MYLICON) chewable tablet 80 mg (has no administration in time range)   heparin (porcine) subcutaneous injection 5,000 Units (5,000 Units Subcutaneous Given 8/8/24 0614)   ceFEPime (MAXIPIME) 2,000 mg in dextrose 5 % 50 mL IVPB (has no administration in time range)   guaiFENesin (MUCINEX) 12 hr tablet 600 mg (600 mg Oral Not Given 8/8/24 0812)   acetaminophen (Ofirmev) injection 1,000 mg (0 mg Intravenous Stopped 8/8/24 0144)   cefepime (MAXIPIME) 2 g/50 mL dextrose IVPB (0 mg Intravenous Stopped 8/8/24 0145)   vancomycin (VANCOCIN) IVPB (premix in dextrose) 1,000 mg 200 mL (0 mg Intravenous Stopped 8/8/24 0256)   albuterol inhalation solution 5 mg (5 mg Nebulization Given 8/8/24 0106)   ipratropium (ATROVENT) 0.02 % inhalation solution 0.5 mg (0.5 mg Nebulization Given 8/8/24 0106)   methylPREDNISolone sodium succinate (Solu-MEDROL) injection 125 mg (125 mg Intravenous Given 8/8/24 0109)   aspirin chewable tablet 324 mg (162 mg Oral Given 8/8/24 0210)   albuterol inhalation solution 5 mg (5 mg Nebulization Given 8/8/24 0254)   ipratropium (ATROVENT) 0.02 % inhalation solution 0.5 mg (0.5 mg Nebulization Given 8/8/24 0254)   iohexol (OMNIPAQUE) 350 MG/ML injection (MULTI-DOSE) 100 mL (100 mL Intravenous Given 8/8/24 0357)       Diagnostic Studies  Results Reviewed       Procedure Component Value Units Date/Time    Blood culture #2 [[id number]] Collected: 08/08/24 0059    Lab Status: Preliminary result Specimen: Blood from Arm, Right Updated: 08/08/24 0801     Blood Culture Received in Microbiology Lab. Culture in Progress.    Blood culture #1 [[id number]] Collected: 08/08/24 0104    Lab Status: Preliminary result Specimen: Blood from Arm, Right Updated: 08/08/24 0801     Blood Culture Received in Microbiology Lab. Culture in Progress.    HS Troponin I 4hr  [[id number]]  (Abnormal) Collected: 08/08/24 0449    Lab Status: Final result Specimen: Blood from Arm, Right Updated: 08/08/24 0530     hs TnI 4hr 106 ng/L      Delta 4hr hsTnI -20 ng/L     HS Troponin I 2hr [[id number]]  (Abnormal) Collected: 08/08/24 0255    Lab Status: Final result Specimen: Blood from Arm, Left Updated: 08/08/24 0327     hs TnI 2hr 100 ng/L      Delta 2hr hsTnI -26 ng/L     Blood gas, venous [[id number]]  (Abnormal) Collected: 08/08/24 0255    Lab Status: Final result Specimen: Blood from Arm, Left Updated: 08/08/24 0311     pH, Nabeel 7.463     pCO2, Nabeel 59.0 mm Hg      pO2, Nabeel 110.7 mm Hg      HCO3, Nabeel 41.3 mmol/L      Base Excess, Nabeel 15.1 mmol/L      O2 Content, Nabeel 15.9 ml/dL      O2 HGB, VENOUS 95.7 %     FLU/RSV/COVID - if FLU/RSV clinically relevant [[id number]]  (Normal) Collected: 08/08/24 0135    Lab Status: Final result Specimen: Nares from Nose Updated: 08/08/24 0241     SARS-CoV-2 Negative     INFLUENZA A PCR Negative     INFLUENZA B PCR Negative     RSV PCR Negative    Narrative:      FOR PEDIATRIC PATIENTS - copy/paste COVID Guidelines URL to browser: [email or web address]    SARS-CoV-2 assay is a Nucleic Acid Amplification assay intended for the  qualitative detection of nucleic acid from SARS-CoV-2 in nasopharyngeal  swabs. Results are for the presumptive identification of SARS-CoV-2 RNA.    Positive results are indicative of infection with SARS-CoV-2, the virus  causing COVID-19, but do not rule out bacterial infection or co-infection  with other viruses. Laboratories within the [place] and its  territories are required to report all positive results to the appropriate  public health authorities. Negative results do not preclude SARS-CoV-2  infection and should not be used as the sole basis for treatment or other  patient management decisions. Negative results must be combined with  clinical observations, patient history,  and epidemiological information.  This test has not been FDA cleared or approved.    This test has been authorized by FDA under an Emergency Use Authorization  (EUA). This test is only authorized for the duration of time the  declaration that circumstances exist justifying the authorization of the  emergency use of an in vitro diagnostic tests for detection of SARS-CoV-2  virus and/or diagnosis of COVID-19 infection under section 564(b)(1) of  the Act, 21 U.S.C. 360bbb-3(b)(1), unless the authorization is terminated  or revoked sooner. The test has been validated but independent review by FDA  and CLIA is pending.    Test performed using [employer] GeneXpert: This RT-PCR assay targets N2,  a region unique to SARS-CoV-2. A conserved region in the E-gene was chosen  for pan-Sarbecovirus detection which includes SARS-CoV-2.    According to CMS-2020-01-R, this platform meets the definition of high-throughput technology.    Procalcitonin [[id number]]  (Normal) Collected: 08/08/24 0059    Lab Status: Final result Specimen: Blood from Arm, Right Updated: 08/08/24 0144     Procalcitonin 0.06 ng/ml     HS Troponin 0hr (reflex protocol) [[id number]]  (Abnormal) Collected: 08/08/24 0059    Lab Status: Final result Specimen: Blood from Arm, Right Updated: 08/08/24 0143     hs TnI 0hr 126 ng/L     B-Type Natriuretic Peptide(BNP) [[id number]]  (Normal) Collected: 08/08/24 0059    Lab Status: Final result Specimen: Blood from Arm, Right Updated: 08/08/24 0142     BNP 51 pg/mL     Comprehensive metabolic panel [[id number]]  (Abnormal) Collected: 08/08/24 0059    Lab Status: Final result Specimen: Blood from Arm, Right Updated: 08/08/24 0134     Sodium 145 mmol/L      Potassium 3.9 mmol/L      Chloride 92 mmol/L      CO2 >45 mmol/L      ANION GAP --     BUN 16 mg/dL      Creatinine 0.77 mg/dL      Glucose 128 mg/dL      Calcium 9.9 mg/dL      AST 22 U/L      ALT 16 U/L      Alkaline Phosphatase 68 U/L      Total Protein 7.6 g/dL       Albumin 4.1 g/dL      Total Bilirubin 0.58 mg/dL      eGFR 82 ml/min/1.73sq m     Narrative:      National Kidney Disease Foundation guidelines for Chronic Kidney Disease (CKD):     Stage 1 with normal or high GFR (GFR > 90 mL/min/1.73 square meters)    Stage 2 Mild CKD (GFR = 60-89 mL/min/1.73 square meters)    Stage 3A Moderate CKD (GFR = 45-59 mL/min/1.73 square meters)    Stage 3B Moderate CKD (GFR = 30-44 mL/min/1.73 square meters)    Stage 4 Severe CKD (GFR = 15-29 mL/min/1.73 square meters)    Stage 5 End Stage CKD (GFR <15 mL/min/1.73 square meters)  Note: GFR calculation is accurate only with a steady state creatinine    Lactic acid [[id number]]  (Normal) Collected: 08/08/24 0059    Lab Status: Final result Specimen: Blood from Arm, Right Updated: 08/08/24 0133     LACTIC ACID 1.1 mmol/L     Narrative:      Result may be elevated if tourniquet was used during collection.    Protime-INR [[id number]]  (Normal) Collected: 08/08/24 0059    Lab Status: Final result Specimen: Blood from Arm, Right Updated: 08/08/24 0130     Protime 13.5 seconds      INR 1.01    Narrative:      INR Therapeutic Range    Indication                                             INR Range      Atrial Fibrillation                                               2.0-3.0  Hypercoagulable State                                    2.0.2.3  Left Ventricular Asist Device                            2.0-3.0  Mechanical Heart Valve                                  -    Aortic(with afib, MI, embolism, HF, LA enlargement,    and/or coagulopathy)                                     2.0-3.0 (2.5-3.5)     Mitral                                                             2.5-3.5  Prosthetic/Bioprosthetic Heart Valve               2.0-3.0  Venous thromboembolism (VTE: VT, PE        2.0-3.0    APTT [[id number]]  (Normal) Collected: 08/08/24 0059    Lab Status: Final result Specimen: Blood from Arm, Right Updated: 08/08/24 0130     PTT 28 seconds     Blood  gas, Venous [[id number]]  (Abnormal) Collected: 08/08/24 0059    Lab Status: Final result Specimen: Blood from Arm, Right Updated: 08/08/24 0122     pH, Nabeel 7.366     pCO2, Nabeel 82.3 mm Hg      pO2, Nabeel 36.5 mm Hg      HCO3, Nabeel 46.1 mmol/L      Base Excess, Nabeel 16.9 mmol/L      O2 Content, Nabeel 11.8 ml/dL      O2 HGB, VENOUS 67.9 %     CBC and differential [[id number]]  (Abnormal) Collected: 08/08/24 0059    Lab Status: Final result Specimen: Blood from Arm, Right Updated: 08/08/24 0117     WBC 11.96 Thousand/uL      RBC 3.52 Million/uL      Hemoglobin 11.0 g/dL      Hematocrit 38.4 %       fL      MCH 31.3 pg      MCHC 28.6 g/dL      RDW 12.5 %      MPV 9.1 fL      Platelets 102 Thousands/uL      nRBC 0 /100 WBCs      Segmented % 79 %      Immature Grans % 0 %      Lymphocytes % 8 %      Monocytes % 10 %      Eosinophils Relative 3 %      Basophils Relative 0 %      Absolute Neutrophils 9.48 Thousands/µL      Absolute Immature Grans 0.04 Thousand/uL      Absolute Lymphocytes 0.90 Thousands/µL      Absolute Monocytes 1.14 Thousand/µL      Eosinophils Absolute 0.38 Thousand/µL      Basophils Absolute 0.02 Thousands/µL     UA w Reflex to Microscopic w Reflex to Culture [[id number]]     Lab Status: No result Specimen: Urine                    CT head without contrast   Final Result by Cat Bonner MD (08/08 0413)      No acute intracranial abnormality.  Stable chronic microangiopathic changes within the brain.      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      Near complete opacification of the right mastoid air cells, increased since prior study.      Workstation performed: [id number]         PE Study with CT Abdomen and Pelvis with contrast   Final Result by Cat Bonner MD (08/08 0455)      No pulmonary embolism.      Right lower lobe airspace consolidation with air bronchograms, similar to slightly increased since prior study, which may be due to  pneumonia, likely aspiration pneumonia given the mucous/debris in the trachea. Recommend short-term follow-up noncontrast    chest CT in 3 months to assess for resolution after treatment.      No acute findings in the abdomen or pelvis.      Stable aneurysmal dilatation of the aortic arch. 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      The study was marked in EPIC for immediate notification.      Workstation performed: [id number]                    Procedures  CriticalCare Time    Date/Time: 8/8/2024 12:45 AM    Performed by: Vaibhav Monet DO  Authorized by: Vaibhav Monet DO    Critical care provider statement:     Critical care time (minutes):  35    Critical care start time:  8/8/2024 12:45 AM    Critical care end time:  8/8/2024 1:20 AM    Critical care time was exclusive of:  Separately billable procedures and treating other patients    Critical care was necessary to treat or prevent imminent or life-threatening deterioration of the following conditions:  Respiratory failure    Critical care was time spent personally by me on the following activities:  Evaluation of patient's response to treatment, examination of patient, ordering and review of laboratory studies, ordering and review of radiographic studies and re-evaluation of patient's condition    I assumed direction of critical care for this patient from another provider in my specialty: no    Comments:      Respiratory distress with hypoxia on normal home oxygen and hypercarbia necessitating BiPAP administration           ED Course  ED Course as of 08/08/24 0902   Thu Aug 08, 2024   0108 Procedure Note: EKG  Date/Time: 08/08/24 1:09 AM   Interpreted by: Vaibhav Monet   Indications / Diagnosis: AMS  ECG reviewed by me, the ED Provider: yes   The EKG demonstrates:  Rhythm: normal sinus with PVCs  Intervals: normal intervals  Axis: normal axis  QRS/Blocks: normal QRS  ST Changes: No acute ST Changes, no STD/SHYANNE.    0124 pCO2, Nabeel(!!): 82.3  Will place on BiPAP   0144 hs TnI 0hr(!): 126  Will trend. Will give aspirin.   0313 pCO2, Nabeel(!): 59.0  Will trial off of BiPAP.  Patient's mental status improving.   0340 Delta 2hr hsTnI: -26                              Initial Sepsis Screening       Row Name 08/08/24 0110 08/08/24 0109             Is the patient's history suggestive of a new or worsening infection? -- Yes (Proceed)  -GJ       Suspected source of infection -- pneumonia  -GJ       Indicate SIRS criteria Tachypnea > 20 resp per min  -GJ Tachycardia > 90 bpm  -GJ       Are two or more of the above signs & symptoms of infection both present and new to the patient? Yes (Proceed)  -GJ --       Assess for evidence of organ dysfunction: Are any of the below criteria present within 6 hours of suspected infection and SIRS criteria that are NOT considered to be chronic conditions? -- --                 User Key  (r) = Recorded By, (t) = Taken By, (c) = Cosigned By      Initials Name Provider Type     Vaibhav Monet,  Physician                        Wells' Criteria for PE      Flowsheet Row Most Recent Value   Wells' Criteria for PE    Clinical signs and symptoms of DVT 0 Filed at: 08/08/2024 0058   PE is primary diagnosis or equally likely 3 Filed at: 08/08/2024 0058   HR >100 1.5 Filed at: 08/08/2024 0058   Immobilization at least 3 days or Surgery in the previous 4 weeks --   Previous, objectively diagnosed PE or DVT 1.5 Filed at: 08/08/2024 0058   Hemoptysis 0 Filed at: 08/08/2024 0058   Malignancy with treatment within 6 months or palliative 0 Filed at: 08/08/2024 0058   Wells' Criteria Total 6 Filed at: 08/08/2024 0058                  Medical Decision Making  Patient with history as above presented with altered mental status. History obtained from EMS report.    Differential diagnosis includes: Pneumonia, pneumothorax, COPD with acute exacerbation, hypoxic and hypercarbic respiratory failure, acute intracranial  bleed, at risk for sepsis    Plan: Sepsis labs, troponin, ECG, CT for PE, CT head, albuterol, ipratropium, Solu-Medrol, Tylenol, cefepime, Vanco    ECG independently interpreted by myself as above. Labs reviewed and remarkable for an elevated troponin, likely stress related in setting of respiratory distress without acute ischemic changes on ECG.  Given aspirin.  Labs also remarkable for elevated pCO2 on VBG.  At that point patient was placed on BiPAP and subsequent VBG with improvement.  Reassessed patient with improvement in mental status following BiPAP administration and wean back to nonrebreather.  Independently reviewed imaging consistent with aspiration pneumonia.  Presentation most consistent with acute respiratory failure secondary to aspiration pneumonia with resultant acute encephalopathy in the setting of hypoxic and hypercapnic respiratory failure.  Discussed patient's management with medicine who agreed to admit patient under their service.    Amount and/or Complexity of Data Reviewed  Labs: ordered. Decision-making details documented in ED Course.  Radiology: ordered.    Risk  OTC drugs.  Prescription drug management.  Decision regarding hospitalization.                 Disposition  Final diagnoses:   Acute hypoxic respiratory failure (HCC)   Acute encephalopathy     Time reflects when diagnosis was documented in both MDM as applicable and the Disposition within this note       Time User Action Codes Description Comment    8/8/2024  4:00 AM Vaibhav Monet Add [J96.01] Acute hypoxic respiratory failure (HCC)     8/8/2024  4:00 AM Vaibhav Monet Add [G93.40] Acute encephalopathy     8/8/2024  5:36 AM Martha Luna Add [J96.21,  J96.22] Acute on chronic respiratory failure with hypoxia and hypercapnia (HCC)           ED Disposition       ED Disposition   Admit    Condition   Stable    Date/Time   Thu Aug 8, 2024 0789    Comment   Case was discussed with MONICO and the patient's admission status was  agreed to be Admission Status: inpatient status to the service of Dr. Olsen .               Follow-up Information    None         Current Discharge Medication List        CONTINUE these medications which have NOT CHANGED    Details   acetaminophen (TYLENOL) 650 mg CR tablet TAKE 1 TABLET (650 MG TOTAL) BY MOUTH EVERY 8 (EIGHT) HOURS AS NEEDED FOR MILD PAIN  Qty: 90 tablet, Refills: 5    Associated Diagnoses: Chronic bilateral low back pain without sciatica      albuterol (PROVENTIL HFA,VENTOLIN HFA) 90 mcg/act inhaler INHALE 2 PUFFS EVERY 6 (SIX) HOURS AS NEEDED FOR WHEEZING  Qty: 18 g, Refills: 5    Comments: Substitution to a formulary equivalent within the same pharmaceutical class is authorized.  Associated Diagnoses: Chronic respiratory failure with hypoxia and hypercapnia (HCC)      budesonide (Pulmicort) 0.5 mg/2 mL nebulizer solution Take 2 mL (0.5 mg total) by nebulization 2 (two) times a day Rinse mouth after use.  Qty: 360 mL, Refills: 3    Associated Diagnoses: Chronic obstructive pulmonary disease, unspecified COPD type (HCC)      formoterol (PERFOROMIST) 20 MCG/2ML nebulizer solution Take 2 mL (20 mcg total) by nebulization 2 (two) times a day  Qty: 360 mL, Refills: 3    Associated Diagnoses: Chronic obstructive pulmonary disease, unspecified COPD type (HCC)      ipratropium-albuterol (DUO-NEB) 0.5-2.5 mg/3 mL nebulizer solution Take 3 mL by nebulization 2 (two) times a day  Qty: 540 mL, Refills: 3    Associated Diagnoses: Chronic obstructive pulmonary disease, unspecified COPD type (HCC)      ketoconazole (NIZORAL) 2 % shampoo Apply 1 Application topically 2 (two) times a week  Qty: 120 mL, Refills: 5    Associated Diagnoses: Seborrheic dermatitis      losartan (COZAAR) 25 mg tablet TAKE ONE TABLET BY MOUTH TWICE DAILYTOMAR 1 TABLETA POR VIA ORAL DOS VECES AL EULAILO  Qty: 180 tablet, Refills: 0    Associated Diagnoses: Benign essential hypertension      mometasone (ELOCON) 0.1 % lotion Apply  topically daily  Qty: 60 mL, Refills: 5    Associated Diagnoses: Seborrheic dermatitis      pantoprazole (PROTONIX) 40 mg tablet TAKE 1 TABLET (40 MG TOTAL) BY MOUTH DAILY  Qty: 30 tablet, Refills: 5    Associated Diagnoses: Long term (current) use of systemic steroids      Senna Plus 8.6-50 MG per tablet TAKE 1 TABLET BY MOUTH DAILY  Qty: 30 tablet, Refills: 5    Associated Diagnoses: Chronic idiopathic constipation      torsemide (DEMADEX) 5 MG tablet Take 1 tablet (5 mg total) by mouth daily  Qty: 30 tablet, Refills: 5    Associated Diagnoses: Acute on chronic congestive heart failure, unspecified heart failure type (HCC)             No discharge procedures on file.    PDMP Review         Value Time User    PDMP Reviewed  Yes 5/20/2024 10:35 PM Calin Chacon PA-C            ED Provider  Electronically Signed by             Vaibhav Monet DO  08/08/24 0931

## 2024-08-09 LAB
ANION GAP SERPL CALCULATED.3IONS-SCNC: 3 MMOL/L (ref 4–13)
BACTERIA UR QL AUTO: ABNORMAL /HPF
BASE EX.OXY STD BLDV CALC-SCNC: 94.4 % (ref 60–80)
BASE EXCESS BLDV CALC-SCNC: 15 MMOL/L
BASOPHILS # BLD AUTO: 0.01 THOUSANDS/ÂΜL (ref 0–0.1)
BASOPHILS NFR BLD AUTO: 0 % (ref 0–1)
BILIRUB UR QL STRIP: NEGATIVE
BUN SERPL-MCNC: 26 MG/DL (ref 5–25)
CALCIUM SERPL-MCNC: 9.8 MG/DL (ref 8.4–10.2)
CHLORIDE SERPL-SCNC: 93 MMOL/L (ref 96–108)
CLARITY UR: CLEAR
CO2 SERPL-SCNC: 44 MMOL/L (ref 21–32)
COLOR UR: ABNORMAL
CREAT SERPL-MCNC: 0.85 MG/DL (ref 0.6–1.3)
EOSINOPHIL # BLD AUTO: 0 THOUSAND/ÂΜL (ref 0–0.61)
EOSINOPHIL NFR BLD AUTO: 0 % (ref 0–6)
ERYTHROCYTE [DISTWIDTH] IN BLOOD BY AUTOMATED COUNT: 12.5 % (ref 11.6–15.1)
GFR SERPL CREATININE-BSD FRML MDRD: 78 ML/MIN/1.73SQ M
GLUCOSE SERPL-MCNC: 147 MG/DL (ref 65–140)
GLUCOSE UR STRIP-MCNC: NEGATIVE MG/DL
HCO3 BLDV-SCNC: 42.5 MMOL/L (ref 24–30)
HCT VFR BLD AUTO: 35.4 % (ref 36.5–49.3)
HGB BLD-MCNC: 10.3 G/DL (ref 12–17)
HGB UR QL STRIP.AUTO: NEGATIVE
IMM GRANULOCYTES # BLD AUTO: 0.1 THOUSAND/UL (ref 0–0.2)
IMM GRANULOCYTES NFR BLD AUTO: 1 % (ref 0–2)
KETONES UR STRIP-MCNC: NEGATIVE MG/DL
LEUKOCYTE ESTERASE UR QL STRIP: NEGATIVE
LYMPHOCYTES # BLD AUTO: 0.59 THOUSANDS/ÂΜL (ref 0.6–4.47)
LYMPHOCYTES NFR BLD AUTO: 6 % (ref 14–44)
MCH RBC QN AUTO: 30.7 PG (ref 26.8–34.3)
MCHC RBC AUTO-ENTMCNC: 29.1 G/DL (ref 31.4–37.4)
MCV RBC AUTO: 105 FL (ref 82–98)
MONOCYTES # BLD AUTO: 0.38 THOUSAND/ÂΜL (ref 0.17–1.22)
MONOCYTES NFR BLD AUTO: 4 % (ref 4–12)
MUCOUS THREADS UR QL AUTO: ABNORMAL
NEUTROPHILS # BLD AUTO: 8.71 THOUSANDS/ÂΜL (ref 1.85–7.62)
NEUTS SEG NFR BLD AUTO: 89 % (ref 43–75)
NITRITE UR QL STRIP: NEGATIVE
NON-SQ EPI CELLS URNS QL MICRO: ABNORMAL /HPF
NRBC BLD AUTO-RTO: 0 /100 WBCS
O2 CT BLDV-SCNC: 15.4 ML/DL
PCO2 BLDV: 69.3 MM HG (ref 42–50)
PH BLDV: 7.41 [PH] (ref 7.3–7.4)
PH UR STRIP.AUTO: 6.5 [PH]
PLATELET # BLD AUTO: 108 THOUSANDS/UL (ref 149–390)
PMV BLD AUTO: 9.8 FL (ref 8.9–12.7)
PO2 BLDV: 86.2 MM HG (ref 35–45)
POTASSIUM SERPL-SCNC: 4.2 MMOL/L (ref 3.5–5.3)
PROCALCITONIN SERPL-MCNC: 0.12 NG/ML
PROT UR STRIP-MCNC: ABNORMAL MG/DL
RBC # BLD AUTO: 3.36 MILLION/UL (ref 3.88–5.62)
RBC #/AREA URNS AUTO: ABNORMAL /HPF
SODIUM SERPL-SCNC: 140 MMOL/L (ref 135–147)
SP GR UR STRIP.AUTO: 1.03 (ref 1–1.03)
UROBILINOGEN UR STRIP-ACNC: <2 MG/DL
WBC # BLD AUTO: 9.79 THOUSAND/UL (ref 4.31–10.16)
WBC #/AREA URNS AUTO: ABNORMAL /HPF

## 2024-08-09 PROCEDURE — 80048 BASIC METABOLIC PNL TOTAL CA: CPT | Performed by: NURSE PRACTITIONER

## 2024-08-09 PROCEDURE — 85025 COMPLETE CBC W/AUTO DIFF WBC: CPT | Performed by: NURSE PRACTITIONER

## 2024-08-09 PROCEDURE — 94760 N-INVAS EAR/PLS OXIMETRY 1: CPT

## 2024-08-09 PROCEDURE — 81001 URINALYSIS AUTO W/SCOPE: CPT

## 2024-08-09 PROCEDURE — 99232 SBSQ HOSP IP/OBS MODERATE 35: CPT

## 2024-08-09 PROCEDURE — 84145 PROCALCITONIN (PCT): CPT | Performed by: NURSE PRACTITIONER

## 2024-08-09 PROCEDURE — 94640 AIRWAY INHALATION TREATMENT: CPT

## 2024-08-09 PROCEDURE — 94762 N-INVAS EAR/PLS OXIMTRY CONT: CPT

## 2024-08-09 PROCEDURE — 82805 BLOOD GASES W/O2 SATURATION: CPT | Performed by: NURSE PRACTITIONER

## 2024-08-09 PROCEDURE — 99232 SBSQ HOSP IP/OBS MODERATE 35: CPT | Performed by: INTERNAL MEDICINE

## 2024-08-09 RX ORDER — HYDRALAZINE HYDROCHLORIDE 20 MG/ML
5 INJECTION INTRAMUSCULAR; INTRAVENOUS EVERY 8 HOURS PRN
Status: DISCONTINUED | OUTPATIENT
Start: 2024-08-09 | End: 2024-08-11 | Stop reason: HOSPADM

## 2024-08-09 RX ORDER — METHYLPREDNISOLONE SODIUM SUCCINATE 40 MG/ML
40 INJECTION, POWDER, LYOPHILIZED, FOR SOLUTION INTRAMUSCULAR; INTRAVENOUS EVERY 12 HOURS SCHEDULED
Status: DISCONTINUED | OUTPATIENT
Start: 2024-08-09 | End: 2024-08-11

## 2024-08-09 RX ORDER — LORAZEPAM 2 MG/ML
0.5 INJECTION INTRAMUSCULAR ONCE
Status: COMPLETED | OUTPATIENT
Start: 2024-08-09 | End: 2024-08-09

## 2024-08-09 RX ADMIN — LORAZEPAM 0.5 MG: 2 INJECTION INTRAMUSCULAR; INTRAVENOUS at 07:42

## 2024-08-09 RX ADMIN — FORMOTEROL FUMARATE 20 MCG: 20 SOLUTION RESPIRATORY (INHALATION) at 19:42

## 2024-08-09 RX ADMIN — GUAIFENESIN 600 MG: 600 TABLET, EXTENDED RELEASE ORAL at 21:13

## 2024-08-09 RX ADMIN — IPRATROPIUM BROMIDE AND ALBUTEROL SULFATE 3 ML: 2.5; .5 SOLUTION RESPIRATORY (INHALATION) at 07:09

## 2024-08-09 RX ADMIN — FORMOTEROL FUMARATE 20 MCG: 20 SOLUTION RESPIRATORY (INHALATION) at 07:09

## 2024-08-09 RX ADMIN — HEPARIN SODIUM 5000 UNITS: 5000 INJECTION INTRAVENOUS; SUBCUTANEOUS at 05:10

## 2024-08-09 RX ADMIN — HEPARIN SODIUM 5000 UNITS: 5000 INJECTION INTRAVENOUS; SUBCUTANEOUS at 21:13

## 2024-08-09 RX ADMIN — HEPARIN SODIUM 5000 UNITS: 5000 INJECTION INTRAVENOUS; SUBCUTANEOUS at 14:52

## 2024-08-09 RX ADMIN — METHYLPREDNISOLONE SODIUM SUCCINATE 40 MG: 40 INJECTION, POWDER, FOR SOLUTION INTRAMUSCULAR; INTRAVENOUS at 21:14

## 2024-08-09 RX ADMIN — PANTOPRAZOLE SODIUM 40 MG: 40 TABLET, DELAYED RELEASE ORAL at 05:09

## 2024-08-09 RX ADMIN — IPRATROPIUM BROMIDE AND ALBUTEROL SULFATE 3 ML: 2.5; .5 SOLUTION RESPIRATORY (INHALATION) at 19:42

## 2024-08-09 RX ADMIN — BUDESONIDE 0.5 MG: 0.5 INHALANT ORAL at 07:09

## 2024-08-09 RX ADMIN — MELATONIN 9 MG: 3 TAB ORAL at 21:13

## 2024-08-09 RX ADMIN — METHYLPREDNISOLONE SODIUM SUCCINATE 40 MG: 40 INJECTION, POWDER, FOR SOLUTION INTRAMUSCULAR; INTRAVENOUS at 05:09

## 2024-08-09 NOTE — PROGRESS NOTES
Progress Note - Pulmonary   Severiano Feliciano 88 y.o. male MRN: [id number]  Unit/Bed#: E4 -01 Encounter: [id number]      Assessment:    Acute on chronic respiratory failure with hypoxia and hypercapnia  Has a history of chronic respiratory failure due to severe COPD and is mostly on 3.5-4 L  Has been following up with pulmonary medicine outpatient  COPD with possible acute exacerbation  Received 125 mg of IV Solu-Medrol initially, as well as nebulizers and antibiotics  Has a history of severe COPD with severely obstructive flow-volume  Abnormal CT  Initially patient had WBC 11.96, initial procalcitonin negative  CT chest showed right lower lobe airspace consolidation with air bronchograms that was similar to prior study but slightly increased for which he received antibiotics  Given negative procalcitonin x 2, patient being afebrile and leukocytosis resolved quickly, no current suspicion for bacterial pneumonia  Sleep apnea  Continue NIPPV nightly, can use BiPAP as needed     Plan:  Decreased frequency of IV Solu-Medrol 40 mg to q12 for now  Continue Graciela Pulmicort, Perforomist  Continue respiratory protocol and airway clearance protocol  Use CPAP at night and as needed during the day whenever patient sleeps  Antibiotics are discontinued with negative procalcitonin x 2    Overnight events-patient received Ativan early in the morning because of agitation and was found sleeping this a.m.    Subjective:   Patient was sleeping during the evaluation and was wearing CPAP.  Discussed the plan and did an overview of current condition with the family including spouse and son at bedside    Objective:   Vitals: Blood pressure 154/68, pulse 77, temperature 98.2 °F (36.8 °C), temperature source Temporal, resp. rate 18, SpO2 97%., On CPAP, There is no height or weight on file to calculate BMI.      Intake/Output Summary (Last 24 hours) at 8/9/2024 0926  Last data filed at 8/9/2024 0406  Gross per 24 hour   Intake --    Output 400 ml   Net -400 ml       Physical Exam  Physical Exam  Vitals and nursing note reviewed.   Constitutional:       General: He is not in acute distress.     Appearance: He is well-developed. He is ill-appearing.      Comments: Sleepy but arousable, falls back to sleep shortly   HENT:      Head: Normocephalic and atraumatic.   Eyes:      Conjunctiva/sclera: Conjunctivae normal.   Cardiovascular:      Rate and Rhythm: Normal rate and regular rhythm.      Heart sounds: No murmur heard.  Pulmonary:      Effort: Pulmonary effort is normal.      Breath sounds: Wheezing (Some bilateral wheezes improved from prior) present.      Comments: Decreased sounds bilaterally  Abdominal:      Palpations: Abdomen is soft.      Tenderness: There is no abdominal tenderness.   Musculoskeletal:         General: No swelling.      Cervical back: Neck supple.   Skin:     General: Skin is warm and dry.      Capillary Refill: Capillary refill takes less than 2 seconds.   Psychiatric:         Mood and Affect: Mood normal.          Labs: I have personally reviewed pertinent lab results.  Laboratory and Diagnostics  Results from last 7 days   Lab Units 08/09/24  0443 08/08/24  0059   WBC Thousand/uL 9.79 11.96*   HEMOGLOBIN g/dL 10.3* 11.0*   HEMATOCRIT % 35.4* 38.4   PLATELETS Thousands/uL 108* 102*   SEGS PCT % 89* 79*   MONO PCT % 4 10   EOS PCT % 0 3     Results from last 7 days   Lab Units 08/09/24  0443 08/08/24  0059   SODIUM mmol/L 140 145   POTASSIUM mmol/L 4.2 3.9   CHLORIDE mmol/L 93* 92*   CO2 mmol/L 44* >45*   ANION GAP mmol/L 3*  --    BUN mg/dL 26* 16   CREATININE mg/dL 0.85 0.77   CALCIUM mg/dL 9.8 9.9   GLUCOSE RANDOM mg/dL 147* 128   ALT U/L  --  16   AST U/L  --  22   ALK PHOS U/L  --  68   ALBUMIN g/dL  --  4.1   TOTAL BILIRUBIN mg/dL  --  0.58          Results from last 7 days   Lab Units 08/08/24  0059   INR  1.01   PTT seconds 28          Results from last 7 days   Lab Units 08/08/24  0059   LACTIC ACID mmol/L 1.1                      Results from last 7 days   Lab Units 08/09/24  0443 08/08/24  0059   PROCALCITONIN ng/ml 0.12 0.06       Imaging and other studies: I have personally reviewed pertinent reports.      CT head without contrast   Final Result by Cat Bonner MD (08/08 0413)      No acute intracranial abnormality.  Stable chronic microangiopathic changes within the brain.      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      Near complete opacification of the right mastoid air cells, increased since prior study.      Workstation performed: [id number]         PE Study with CT Abdomen and Pelvis with contrast   Final Result by Cat Bonner MD (08/08 045)      No pulmonary embolism.      Right lower lobe airspace consolidation with air bronchograms, similar to slightly increased since prior study, which may be due to pneumonia, likely aspiration pneumonia given the mucous/debris in the trachea. Recommend short-term follow-up noncontrast    chest CT in 3 months to assess for resolution after treatment.      No acute findings in the abdomen or pelvis.      Stable aneurysmal dilatation of the aortic arch. 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      The study was marked in EPIC for immediate notification.      Workstation performed: [id number]              Gunnar Zacarias MD  [facility]

## 2024-08-09 NOTE — PROGRESS NOTES
Deterioration Index Critical Care Recommendations  Room #: 451  Deterioration index score: 60.56%    Critical Care recommends     Spoke with   Melina Ugalde RN  Registered Nurse      from primary team    Brief summary:   Critical care was ordered via deterioration index. The alert was concerning for unknown.     Please contact critical care via [place] [facility] with any questions or concerns.

## 2024-08-09 NOTE — PLAN OF CARE
Problem: Prexisting or High Potential for Compromised Skin Integrity  Goal: Skin integrity is maintained or improved  Description: INTERVENTIONS:  - Identify patients at risk for skin breakdown  - Assess and monitor skin integrity  - Assess and monitor nutrition and hydration status  - Monitor labs   - Assess for incontinence   - Turn and reposition patient  - Assist with mobility/ambulation  - Relieve pressure over bony prominences  - Avoid friction and shearing  - Provide appropriate hygiene as needed including keeping skin clean and dry  - Evaluate need for skin moisturizer/barrier cream  - Collaborate with interdisciplinary team   - Patient/family teaching  - Consider wound care consult   Outcome: Progressing     Problem: PAIN - ADULT  Goal: Verbalizes/displays adequate comfort level or baseline comfort level  Description: Interventions:  - Encourage patient to monitor pain and request assistance  - Assess pain using appropriate pain scale  - Administer analgesics based on type and severity of pain and evaluate response  - Implement non-pharmacological measures as appropriate and evaluate response  - Consider cultural and social influences on pain and pain management  - Notify physician/advanced practitioner if interventions unsuccessful or patient reports new pain  Outcome: Progressing     Problem: INFECTION - ADULT  Goal: Absence or prevention of progression during hospitalization  Description: INTERVENTIONS:  - Assess and monitor for signs and symptoms of infection  - Monitor lab/diagnostic results  - Monitor all insertion sites, i.e. indwelling lines, tubes, and drains  - Monitor endotracheal if appropriate and nasal secretions for changes in amount and color  - [place] appropriate cooling/warming therapies per order  - Administer medications as ordered  - Instruct and encourage patient and family to use good hand hygiene technique  - Identify and instruct in appropriate isolation precautions for  identified infection/condition  Outcome: Progressing  Goal: Absence of fever/infection during neutropenic period  Description: INTERVENTIONS:  - Monitor WBC    Outcome: Progressing     Problem: SAFETY ADULT  Goal: Patient will remain free of falls  Description: INTERVENTIONS:  - Educate patient/family on patient safety including physical limitations  - Instruct patient to call for assistance with activity   - Consult OT/PT to assist with strengthening/mobility   - Keep Call bell within reach  - Keep bed low and locked with side rails adjusted as appropriate  - Keep care items and personal belongings within reach  - Initiate and maintain comfort rounds  - Make Fall Risk Sign visible to staff  - Offer Toileting every 2 Hours, in advance of need  - Initiate/Maintain alarms alarm  - Obtain necessary fall risk management equipment: alarms   - Apply yellow socks and bracelet for high fall risk patients  - Consider moving patient to room near nurses station  Outcome: Progressing  Goal: Maintain or return to baseline ADL function  Description: INTERVENTIONS:  -  Assess patient's ability to carry out ADLs; assess patient's baseline for ADL function and identify physical deficits which impact ability to perform ADLs (bathing, care of mouth/teeth, toileting, grooming, dressing, etc.)  - Assess/evaluate cause of self-care deficits   - Assess range of motion  - Assess patient's mobility; develop plan if impaired  - Assess patient's need for assistive devices and provide as appropriate  - Encourage maximum independence but intervene and supervise when necessary  - Involve family in performance of ADLs  - Assess for home care needs following discharge   - Consider OT consult to assist with ADL evaluation and planning for discharge  - Provide patient education as appropriate  Outcome: Progressing  Goal: Maintains/Returns to pre admission functional level  Description: INTERVENTIONS:  - Perform AM-PAC 6 Click Basic Mobility/ Daily  Activity assessment daily.  - Set and communicate daily mobility goal to care team and patient/family/caregiver.   - Collaborate with rehabilitation services on mobility goals if consulted  - Perform Range of Motion 3 times a day.  - Reposition patient every 2 hours.  - Dangle patient 3 times a day  - Stand patient 3 times a day  - Ambulate patient 3 times a day  - Out of bed to chair 3 times a day   - Out of bed for meals 3 times a day  - Out of bed for toileting  - Record patient progress and toleration of activity level   Outcome: Progressing     Problem: DISCHARGE PLANNING  Goal: Discharge to home or other facility with appropriate resources  Description: INTERVENTIONS:  - Identify barriers to discharge w/patient and caregiver  - Arrange for needed discharge resources and transportation as appropriate  - Identify discharge learning needs (meds, wound care, etc.)  - Arrange for interpretive services to assist at discharge as needed  - Refer to Case Management Department for coordinating discharge planning if the patient needs post-hospital services based on physician/advanced practitioner order or complex needs related to functional status, cognitive ability, or social support system  Outcome: Progressing     Problem: Knowledge Deficit  Goal: Patient/family/caregiver demonstrates understanding of disease process, treatment plan, medications, and discharge instructions  Description: Complete learning assessment and assess knowledge base.  Interventions:  - Provide teaching at level of understanding  - Provide teaching via preferred learning methods  Outcome: Progressing     Problem: Nutrition/Hydration-ADULT  Goal: Nutrient/Hydration intake appropriate for improving, restoring or maintaining nutritional needs  Description: Monitor and assess patient's nutrition/hydration status for malnutrition. Collaborate with interdisciplinary team and initiate plan and interventions as ordered.  Monitor patient's weight and  dietary intake as ordered or per policy. Utilize nutrition screening tool and intervene as necessary. Determine patient's food preferences and provide high-protein, high-caloric foods as appropriate.     INTERVENTIONS:  - Monitor oral intake, urinary output, labs, and treatment plans  - Assess nutrition and hydration status and recommend course of action  - Evaluate amount of meals eaten  - Assist patient with eating if necessary   - Allow adequate time for meals  - Recommend/ encourage appropriate diets, oral nutritional supplements, and vitamin/mineral supplements  - Order, calculate, and assess calorie counts as needed  - Recommend, monitor, and adjust tube feedings and TPN/PPN based on assessed needs  - Assess need for intravenous fluids  - Provide specific nutrition/hydration education as appropriate  - Include patient/family/caregiver in decisions related to nutrition  Outcome: Progressing

## 2024-08-09 NOTE — UTILIZATION REVIEW
Initial Clinical Review    Admission: Date/Time/Statement:   Admission Orders (From admission, onward)       Ordered        08/08/24 0527  INPATIENT ADMISSION  Once                          Orders Placed This Encounter   Procedures    INPATIENT ADMISSION     Standing Status:   Standing     Number of Occurrences:   1     Order Specific Question:   Level of Care     Answer:   Med Surg [16]     Order Specific Question:   Estimated length of stay     Answer:   More than 2 Midnights     Order Specific Question:   Certification     Answer:   I certify that inpatient services are medically necessary for this patient for a duration of greater than two midnights. See H&P and MD Progress Notes for additional information about the patient's course of treatment.     ED Arrival Information       Expected   -    Arrival   8/8/2024 00:41    Acuity   Emergent              Means of arrival   Ambulance    Escorted by   [facility] ([facility])    Service   Hospitalist    Admission type   Emergency              Arrival complaint   -             Chief Complaint   Patient presents with    Altered Mental Status     Per ems pt acting increasingly confused for last 2 days, has a cough and taking albuterol tx but no relief. Baseline 4L, 79% at 4L in triage. Per Ems pt has temp of 100.5 no meds given. Pt tachy in triage, not answering questions for this writer.      Initial Presentation: 88 y.o. male presents to the ED via EMS from home with c/o respiratory failure requiring BIPAP.  PMH: end stage COPD w/ freq readmits for acute on chronic resp failure, has been offered palliative care in the past but deferred, HTN, bilat hearing loss, h/o DVT, chronic resp failure .  In the ED pt is HTN.  Labs - leukocytosis, elevated CO2 > 45, elevated troponin.  Imaging - near complete opacification of the right mastoid air cells.  Treated with IV Tylenol, IV antibiotics, nebs, IV SoluMedrol, ASA.  On exam quite somnolent and lethargic on BiPAP,  arouses to noxious stimuli only but does not stay awake long enough to participate in conversation or follow commands.  Not in distress on BIPAP.    Admitted to INPATIENT status with respiratory failure and COPD exacerbation, acute metabolic encephalopathy, feverr,  - BIPAP to  NRB to NC oxygen, DuoNebs TID, Budesonide BID, PRN nebs, pulmonary consult, SLP eval, tele, blood cultures.      Anticipated Length of Stay/Certification Statement:  Patient will be admitted on an Inpatient basis with an anticipated length of stay of  > 2 midnights.   Justification for Hospital Stay: Hypoxic and hypercarbic respiratory failure, Gold E COPD     8/8 Pulmonary Consult - acute on chroinc hypoxic, hypercapnic resp failure, COPD w/ exac, acute on chronic bronchitis, RLL opacity - nebs, IV SoluMedrol, IV antibiotics, wean oxygen. On physical exam he had tachypnea, respiratory distress and rhonchi and was somnolent. Early in the morning required 15 L nonrebreather, required BiPAP and now is down to 6 L nasal cannula.     Date: 8/9   Day 2:   Acute resp failure and prob aspiration PNA - Overnight pt was agitated and placed on CPAP, needed continual observation, VBG obtained, treated with Ativan so sleepy this AM but arousable. Will decrease IV steroids to 40 mg q 12 hr, continue CPAP at HS and PRN during the day. D/c antibiotics as procal is negative x 2.  On exam decreased breath sounds bilat.  Blood cultures negative. Encephalopathy improving today.  ECG negative for acute ischemic changes.    ED Triage Vitals   Temperature Pulse Respirations Blood Pressure SpO2 Pain Score   08/08/24 0049 08/08/24 0049 08/08/24 0049 08/08/24 0049 08/08/24 0049 08/08/24 1915   100 °F (37.8 °C) 102 22 (!) 161/107 94 % No Pain     Weight (last 2 days)       None            Vital Signs (last 3 days)       Date/Time Temp Pulse Resp BP MAP (mmHg) SpO2 FiO2 (%) Calculated FIO2 (%) - Nasal Cannula O2 Flow Rate (L/min) Nasal Cannula O2 Flow Rate (L/min) O2  Device O2 Interface Device Patient Position - Orthostatic VS Andrea Coma Scale Score Pain    08/09/24 1130 -- -- -- -- -- -- -- -- -- -- -- -- -- 13 --    08/09/24 1129 98 °F (36.7 °C) 84 18 157/78 94 94 % -- -- -- -- CPAP -- Lying -- --    08/09/24 1017 -- 77 -- 169/88 -- -- -- -- -- -- -- -- -- -- --    08/09/24 0800 -- -- -- -- -- -- -- -- -- -- -- -- -- -- No Pain    08/09/24 0730 -- -- -- -- -- -- -- -- -- -- -- -- -- 13 --    08/09/24 0713 -- -- -- -- -- 97 % -- -- -- -- -- Full face mask -- -- --    08/09/24 0711 -- -- -- -- -- 98 % 40 -- -- -- CPAP -- -- -- --    08/09/24 0235 98.2 °F (36.8 °C) 77 18 154/68 103 96 % -- 36 -- 4 L/min Nasal cannula -- Lying -- --    08/09/24 0122 -- -- -- -- -- 96 % -- -- -- -- -- Full face mask -- -- --    08/08/24 2338 98.3 °F (36.8 °C) 80 18 132/60 86 95 % -- 36 -- 4 L/min Nasal cannula -- Lying -- --    08/08/24 1935 99.2 °F (37.3 °C) 87 18 132/63 91 100 % -- 36 -- 4 L/min Nasal cannula -- Lying -- --    08/08/24 1932 -- -- -- -- -- 96 % -- 36 -- 4 L/min Nasal cannula -- -- -- --    08/08/24 1915 -- -- -- -- -- -- -- -- -- -- -- -- -- 14 No Pain    08/08/24 1656 -- -- -- -- -- 94 % -- 32 -- 3 L/min Nasal cannula -- -- -- --    08/08/24 1616 98.5 °F (36.9 °C) 79 20 124/56 86 96 % -- 32 -- 3 L/min Nasal cannula -- Lying -- --    08/08/24 1303 -- -- -- -- -- 95 % -- 32 -- 3 L/min Nasal cannula -- -- -- --    08/08/24 1052 98.1 °F (36.7 °C) 79 20 129/67 76 95 % -- 44 -- 6 L/min Nasal cannula -- Sitting -- --    08/08/24 1009 -- -- -- -- -- 95 % -- 44 -- 6 L/min Nasal cannula -- -- -- --    08/08/24 0750 -- -- -- -- -- 98 % -- -- -- -- -- Full face mask -- -- --    08/08/24 0734 -- -- -- -- -- 100 % 60 -- -- -- BiPAP -- -- -- --    08/08/24 0731 -- 82 20 121/61 86 100 % -- -- -- -- BiPAP -- Lying -- --    08/08/24 0730 -- -- -- -- -- 99 % -- -- -- -- BiPAP -- -- 14 --    08/08/24 0616 97.8 °F (36.6 °C) 88 22 166/98 126 100 % -- 44 -- 6 L/min Nasal cannula -- Lying -- --     08/08/24 0500 -- 88 22 143/65 93 100 % -- -- 15 L/min -- Non-rebreather mask -- Sitting -- --    08/08/24 0412 98.1 °F (36.7 °C) -- -- -- -- -- -- -- -- -- -- -- -- -- --    08/08/24 0400 -- 95 22 143/70 101 100 % -- 80 -- 15 L/min Non-rebreather mask -- Sitting 14 --    08/08/24 0300 -- 85 20 126/62 89 100 % -- -- -- -- BiPAP -- Sitting -- --    08/08/24 0145 -- 97 27 161/65 105 98 % -- -- -- -- BiPAP -- Sitting -- --    08/08/24 0131 -- -- -- -- -- 99 % -- -- -- -- -- Full face mask -- -- --    08/08/24 0100 -- -- -- -- -- -- -- -- 15 L/min -- Non-rebreather mask -- -- 11 --    08/08/24 0049 100 °F (37.8 °C) 102 22 161/107 -- 94 % -- 52 -- 8 L/min Nasal cannula -- Lying -- --              Pertinent Labs/Diagnostic Test Results:   Radiology:  CT head without contrast   Final Interpretation by Cat Bonner MD (08/08 0413)      No acute intracranial abnormality.  Stable chronic microangiopathic changes within the brain.      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      Near complete opacification of the right mastoid air cells, increased since prior study.      Workstation performed: [id number]         PE Study with CT Abdomen and Pelvis with contrast   Final Interpretation by Cat Bonner MD (08/08 0455)      No pulmonary embolism.      Right lower lobe airspace consolidation with air bronchograms, similar to slightly increased since prior study, which may be due to pneumonia, likely aspiration pneumonia given the mucous/debris in the trachea. Recommend short-term follow-up noncontrast    chest CT in 3 months to assess for resolution after treatment.      No acute findings in the abdomen or pelvis.      Stable aneurysmal dilatation of the aortic arch. 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     Cardiology:  ECG 12 lead   Final Result by Justino Pierre DO (08/08 0949)   Sinus rhythm with Premature supraventricular  complexes and with occasional    Premature ventricular complexes   Otherwise normal ECG   When compared with ECG of 08-AUG-2024 02:56, (unconfirmed)   Premature supraventricular complexes are now Present   Confirmed by Justino Pierre ([id number]) on 8/8/2024 9:49:49 AM      ECG 12 lead   Final Result by Justino Pierre DO (08/08 0949)   Sinus rhythm with sinus arrhythmia with occasional Premature ventricular    complexes   Otherwise normal ECG   Confirmed by Justino Pierre ([id number]) on 8/8/2024 9:49:40 AM      ECG 12 lead   Final Result by Justino Pierre DO (08/08 0947)   Sinus rhythm with Premature supraventricular complexes   Otherwise normal ECG   Confirmed by Justino Pierre ([id number]) on 8/8/2024 9:47:14 AM        GI:  No orders to display       Results from last 7 days   Lab Units 08/08/24  0135   SARS-COV-2  Negative     Results from last 7 days   Lab Units 08/09/24  0443 08/08/24  0059   WBC Thousand/uL 9.79 11.96*   HEMOGLOBIN g/dL 10.3* 11.0*   HEMATOCRIT % 35.4* 38.4   PLATELETS Thousands/uL 108* 102*   TOTAL NEUT ABS Thousands/µL 8.71* 9.48*         Results from last 7 days   Lab Units 08/09/24  0443 08/08/24  0059   SODIUM mmol/L 140 145   POTASSIUM mmol/L 4.2 3.9   CHLORIDE mmol/L 93* 92*   CO2 mmol/L 44* >45*   ANION GAP mmol/L 3*  --    BUN mg/dL 26* 16   CREATININE mg/dL 0.85 0.77   EGFR ml/min/1.73sq m 78 82   CALCIUM mg/dL 9.8 9.9     Results from last 7 days   Lab Units 08/08/24  0059   AST U/L 22   ALT U/L 16   ALK PHOS U/L 68   TOTAL PROTEIN g/dL 7.6   ALBUMIN g/dL 4.1   TOTAL BILIRUBIN mg/dL 0.58         Results from last 7 days   Lab Units 08/09/24  0443 08/08/24  0059   GLUCOSE RANDOM mg/dL 147* 128     Results from last 7 days   Lab Units 08/09/24  0946 08/08/24  0255 08/08/24  0059   PH DUSTIN  7.406* 7.463* 7.366   PCO2 DUSTIN mm Hg 69.3* 59.0* 82.3*   PO2 DUSTIN mm Hg 86.2* 110.7* 36.5   HCO3 DUSTIN mmol/L 42.5* 41.3* 46.1*   BASE EXC DUSTIN mmol/L 15.0 15.1 16.9   O2 CONTENT DUSTIN ml/dL 15.4 15.9 11.8    O2 HGB, VENOUS % 94.4* 95.7* 67.9         Results from last 7 days   Lab Units 08/08/24  0449 08/08/24  0255 08/08/24  0059   HS TNI 0HR ng/L  --   --  126*   HS TNI 2HR ng/L  --  100*  --    HSTNI D2 ng/L  --  -26  --    HS TNI 4HR ng/L 106*  --   --    HSTNI D4 ng/L -20  --   --          Results from last 7 days   Lab Units 08/08/24  0059   PROTIME seconds 13.5   INR  1.01   PTT seconds 28         Results from last 7 days   Lab Units 08/09/24  0443 08/08/24  0059   PROCALCITONIN ng/ml 0.12 0.06     Results from last 7 days   Lab Units 08/08/24  0059   LACTIC ACID mmol/L 1.1             Results from last 7 days   Lab Units 08/08/24  0059   BNP pg/mL 51     Results from last 7 days   Lab Units 08/08/24  0135   INFLUENZA A PCR  Negative   INFLUENZA B PCR  Negative   RSV PCR  Negative     Results from last 7 days   Lab Units 08/08/24  0104 08/08/24  0059   BLOOD CULTURE  No Growth at 24 hrs. No Growth at 24 hrs.         ED Treatment-Medication Administration from 08/08/2024 0041 to 08/08/2024 0558         Date/Time Order Dose Route Action     08/08/2024 0115 acetaminophen (Ofirmev) injection 1,000 mg 1,000 mg Intravenous New Bag     08/08/2024 0117 cefepime (MAXIPIME) 2 g/50 mL dextrose IVPB 2,000 mg Intravenous New Bag     08/08/2024 0149 vancomycin (VANCOCIN) IVPB (premix in dextrose) 1,000 mg 200 mL 1,000 mg Intravenous New Bag     08/08/2024 0106 albuterol inhalation solution 5 mg 5 mg Nebulization Given     08/08/2024 0106 ipratropium (ATROVENT) 0.02 % inhalation solution 0.5 mg 0.5 mg Nebulization Given     08/08/2024 0109 methylPREDNISolone sodium succinate (Solu-MEDROL) injection 125 mg 125 mg Intravenous Given     08/08/2024 0210 aspirin chewable tablet 324 mg 162 mg Oral Given     08/08/2024 0254 albuterol inhalation solution 5 mg 5 mg Nebulization Given     08/08/2024 0254 ipratropium (ATROVENT) 0.02 % inhalation solution 0.5 mg 0.5 mg Nebulization Given     08/08/2024 0357 iohexol (OMNIPAQUE) 350 MG/ML  injection (MULTI-DOSE) 100 mL 100 mL Intravenous Given            Past Medical History:   Diagnosis Date    Acute metabolic encephalopathy 06/13/2020    Age-related cataract of right eye 04/04/2023    patient is medically cleared and presents with low risk of complication with this upcoming R cataract surgery.    Anemia     Aneurysm, aorta, thoracic (HCC)     Appendicolith     Ascending aortic aneurysm (HCC)     3.7    Asthma     BPH (benign prostatic hyperplasia)     CAD (coronary artery disease)     noted on CT scan    CHF (congestive heart failure) (HCC)     COPD (chronic obstructive pulmonary disease) (HCC)     Descending thoracic aortic aneurysm (HCC)     Diabetes mellitus (HCC)     Diverticulosis     Former tobacco use     GERD (gastroesophageal reflux disease)     History of DVT (deep vein thrombosis)     Left leg    History of transfusion     Hypertension     Hypoxemia 04/30/2023    Inguinal hernia     right    Nephrolithiasis     Oxygen dependent     2LNC    Oxygen dependent     Pneumonia     Pre-diabetes     Prostate calculus     PVD (peripheral vascular disease) (HCC)     Recurrent right inguinal hernia w incarcertion 01/04/2023    Thoracic aortic aneurysm without rupture (HCC) 11/19/2018    Added automatically from request for surgery [id number]    Thrombocytopenia (HCC) 12/29/2018    Ulcer     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)     Varicose vein of leg     b/l     Present on Admission:   Elevated troponin   Bilateral hearing loss   Benign essential hypertension   Acute on chronic respiratory failure with hypoxia and hypercapnia (HCC)   Acute metabolic encephalopathy   Low grade fever   COPD, severe (HCC)      Admitting Diagnosis: Altered mental status [R41.82]  Acute encephalopathy [G93.40]  Acute on chronic respiratory failure with hypoxia and hypercapnia (HCC) [J96.21, J96.22]  Acute hypoxic respiratory failure (HCC) [J96.01]  Age/Sex: 88 y.o. male  Admission Orders:  Scheduled  Medications:  budesonide, 0.5 mg, Nebulization, BID  formoterol, 20 mcg, Nebulization, Q12H  guaiFENesin, 600 mg, Oral, Q12H KIKE  heparin (porcine), 5,000 Units, Subcutaneous, Q8H KIKE  ipratropium-albuterol, 3 mL, Nebulization, BID  losartan, 25 mg, Oral, BID  melatonin, 9 mg, Oral, HS  methylPREDNISolone sodium succinate, 40 mg, Intravenous, Q12H KIKE  pantoprazole, 40 mg, Oral, Early Morning  senna-docusate sodium, 1 tablet, Oral, Daily  torsemide, 5 mg, Oral, Daily      Continuous IV Infusions:     PRN Meds:  acetaminophen, 650 mg, Oral, Q4H PRN  albuterol, 2 puff, Inhalation, Q6H PRN  aluminum-magnesium hydroxide-simethicone, 30 mL, Oral, Q6H PRN  bisacodyl, 10 mg, Rectal, Daily PRN  hydrALAZINE, 5 mg, Intravenous, Q8H PRN  simethicone, 80 mg, Oral, 4x Daily PRN    MS and upgraded to level 2 SD  UA w/ culture  IV steroids  Heparin sq   NRB Mask   Dysphagia diet   CPAP  IP CONSULT TO PULMONOLOGY    Network Utilization Review Department  ATTENTION: Please call with any questions or concerns to [phone] and carefully listen to the prompts so that you are directed to the right person. All voicemails are confidential.   For Discharge needs, contact Care Management DC Support Team at [phone] opt. [phone]  Send all requests for admission clinical reviews, approved or denied determinations and any other requests to dedicated fax number below belonging to the [facility] where the patient is receiving treatment. List of dedicated fax numbers for the Facilities:  FACILITY NAME UR FAX NUMBER   ADMISSION DENIALS (Administrative/Medical Necessity) [phone]   DISCHARGE SUPPORT TEAM (NETWORK) [phone]   PARENT CHILD HEALTH (Maternity/NICU/Pediatrics) [phone]   [facility] [phone]   [facility] [phone]   [facility] [phone]   [facility] [phone]   [facility] [phone]    [facility] [phone]   [facility] [phone]   [facility] [phone]   [facility] [phone]   [facility] [phone]   [facility] [phone]   [facility] [phone]

## 2024-08-09 NOTE — PROGRESS NOTES
[facility]  Progress Note  Name: Severiano Feliciano I  MRN: [id number]  Unit/Bed#: E4 -01 I Date of Admission: 8/8/2024   Date of Service: 8/9/2024 I Hospital Day: 1    Assessment & Plan   * Acute on chronic respiratory failure with hypoxia and hypercapnia (HCC)  Assessment & Plan  Patient presented due to AMS. Frequent readmissions for COPD exacerbation and respiratory failure.  History of GOLD E COPD and chronic respiratory failure maintained on 2-3LNC and nocturnal CPAP.  Per outpatient pulmonology note, offered palliative care although patient declined  PTA maintained on Budesonide BID and Duonebs TID with PRN albuterol in between as needed, Supplemental oxygen: none at rest, 3L with exertion. Nocturnal NIPPV.  Pulmonary rehab.  CT neg for PE.   Right lower lobe airspace consolidation with air bronchograms, similar to slightly increased since prior study, which may be due to pneumonia, likely aspiration pneumonia given the mucous/debris in the trachea.   Recommend short-term follow-up noncontrast chest CT in 3 months to assess for resolution after treatment  Continue budesonide, add DuoNeb qid, PRN albuterol, and IV solumedrol Goal O2 sat 88% or above  Continue BIPAP at bedtime and as needed during the day  SLP consulted  Aspiration precautions  Pulmonary consulted appreciate recommendation    Low grade fever  Assessment & Plan  Presented with low-grade fever 100.5.  WBC 11.9.  Did not meet SIRS criteria  Likely secondary to aspiration pneumonitis   Normal PCT x2 discontinue iv antibiotics   UA pending collection  Blood cultures negative   Afebrile since admission    Acute metabolic encephalopathy  Assessment & Plan  Encephalopathy in setting of hypoxia and hypercapnia.  Previous admissions with same presentation  Hypercapnia on VBG improving   CT head negative for acute intracranial abnormality  Improving     Elevated troponin  Assessment & Plan  Troponin 126-->100--> 106,   likely demand ischemia from respiratory failure  EKG negative for acute ischemic changes  Received aspirin bolus in ED    Bilateral hearing loss  Assessment & Plan  Follows with ENT.  Noncompliant with use of hearing aids    Benign essential hypertension  Assessment & Plan  Losartan and torsemide    History of DVT (deep vein thrombosis)  Assessment & Plan  DVT ppx with SQ heparin    COPD, severe (HCC)  Assessment & Plan  History of Gold E COPD. PTA maintained on Budesonide BID and Duonebs TID with PRN albuterol in between as needed, Supplemental oxygen: none at rest, 3L with exertion. Nocturnal NIPPV.  Pulmonary rehab.  See respiratory failure above           VTE Pharmacologic Prophylaxis: VTE Score: 7 High Risk (Score >/= 5) - Pharmacological DVT Prophylaxis Ordered: heparin. Sequential Compression Devices Ordered.    Mobility:   Basic Mobility Inpatient Raw Score: 12  JH-HLM Goal: 4: Move to chair/commode  JH-HLM Achieved: 3: Sit at edge of bed  JH-HLM Goal achieved. Continue to encourage appropriate mobility.    Patient Centered Rounds: I performed bedside rounds with nursing staff today.   Discussions with Specialists or Other Care Team Provider: cm    Education and Discussions with Family / Patient: Updated  (wife) at bedside.    Total Time Spent on Date of Encounter in care of patient: 30 mins. This time was spent on one or more of the following: performing physical exam; counseling and coordination of care; obtaining or reviewing history; documenting in the medical record; reviewing/ordering tests, medications or procedures; communicating with other healthcare professionals and discussing with patient's family/caregivers.    Current Length of Stay: 1 day(s)  Current Patient Status: Inpatient   Certification Statement: The patient will continue to require additional inpatient hospital stay due to acute on chronic respiratory failure  Discharge Plan: Anticipate discharge in 24-48 hrs to  home.    Code Status: Level 1 - Full Code    Subjective:   Patient was seen laying in bed today with family at bedside.  He remains lethargic today.  He is arousable.  He responds to questions when asked by his wife    Objective:     Vitals:   Temp (24hrs), Av.4 °F (36.9 °C), Min:98 °F (36.7 °C), Max:99.2 °F (37.3 °C)    Temp:  [98 °F (36.7 °C)-99.2 °F (37.3 °C)] 98 °F (36.7 °C)  HR:  [77-87] 84  Resp:  [18-20] 18  BP: (124-169)/(56-88) 157/78  SpO2:  [94 %-100 %] 94 %  There is no height or weight on file to calculate BMI.     Input and Output Summary (last 24 hours):     Intake/Output Summary (Last 24 hours) at 2024 1141  Last data filed at 2024 0406  Gross per 24 hour   Intake --   Output 400 ml   Net -400 ml       Physical Exam:   Physical Exam  Vitals and nursing note reviewed.   Constitutional:       Appearance: Normal appearance. He is ill-appearing (chronically).   HENT:      Head: Normocephalic and atraumatic.   Eyes:      General: No scleral icterus.  Cardiovascular:      Rate and Rhythm: Normal rate and regular rhythm.   Pulmonary:      Effort: Pulmonary effort is normal.      Breath sounds: Normal breath sounds.   Abdominal:      General: Abdomen is flat. Bowel sounds are normal.      Palpations: Abdomen is soft.   Musculoskeletal:      Right lower leg: No edema.      Left lower leg: No edema.   Skin:     General: Skin is dry.   Neurological:      Mental Status: He is alert.      Comments: Sleepy but arousable    Psychiatric:         Mood and Affect: Mood normal.         Behavior: Behavior normal.          Additional Data:     Labs:  Results from last 7 days   Lab Units 24  0443   WBC Thousand/uL 9.79   HEMOGLOBIN g/dL 10.3*   HEMATOCRIT % 35.4*   PLATELETS Thousands/uL 108*   SEGS PCT % 89*   LYMPHO PCT % 6*   MONO PCT % 4   EOS PCT % 0     Results from last 7 days   Lab Units 24  0443 24  0059   SODIUM mmol/L 140 145   POTASSIUM mmol/L 4.2 3.9   CHLORIDE mmol/L 93* 92*    CO2 mmol/L 44* >45*   BUN mg/dL 26* 16   CREATININE mg/dL 0.85 0.77   ANION GAP mmol/L 3*  --    CALCIUM mg/dL 9.8 9.9   ALBUMIN g/dL  --  4.1   TOTAL BILIRUBIN mg/dL  --  0.58   ALK PHOS U/L  --  68   ALT U/L  --  16   AST U/L  --  22   GLUCOSE RANDOM mg/dL 147* 128     Results from last 7 days   Lab Units 08/08/24  0059   INR  1.01             Results from last 7 days   Lab Units 08/09/24  0443 08/08/24  0059   LACTIC ACID mmol/L  --  1.1   PROCALCITONIN ng/ml 0.12 0.06       Lines/Drains:  Invasive Devices       Peripheral Intravenous Line  Duration             Peripheral IV 08/08/24 Left;Proximal;Ventral (anterior) Forearm 1 day    Peripheral IV 08/08/24 Right Antecubital 1 day                    Imaging: No pertinent imaging reviewed.    Recent Cultures (last 7 days):   Results from last 7 days   Lab Units 08/08/24  0104 08/08/24  0059   BLOOD CULTURE  No Growth at 24 hrs. No Growth at 24 hrs.       Last 24 Hours Medication List:   Current Facility-Administered Medications   Medication Dose Route Frequency Provider Last Rate    acetaminophen  650 mg Oral Q4H PRN RAQUEL Guerrero      albuterol  2 puff Inhalation Q6H PRN RAQUEL Guerrero      aluminum-magnesium hydroxide-simethicone  30 mL Oral Q6H PRN RAQUEL Guerrero      bisacodyl  10 mg Rectal Daily PRN RAQUEL Guerrero      budesonide  0.5 mg Nebulization BID RAQUEL Guerrero      formoterol  20 mcg Nebulization Q12H Gunnar Zacarias MD      guaiFENesin  600 mg Oral Q12H [facility] García Herrmann DO      heparin (porcine)  5,000 Units Subcutaneous Q8H [facility] RAQUEL Guerrero      hydrALAZINE  5 mg Intravenous Q8H PRN Lizzie Greene PA-C      ipratropium-albuterol  3 mL Nebulization BID García Herrmann, DO      losartan  25 mg Oral BID RAQUEL Guerrero      melatonin  9 mg Oral HS RAQUEL Guerrero      methylPREDNISolone sodium succinate  40 mg Intravenous Q8H [facility] Gunnar  MD Deann      pantoprazole  40 mg Oral Early Morning Martha Luna, RAQUEL      senna-docusate sodium  1 tablet Oral Daily RAQUEL Guerrero      simethicone  80 mg Oral 4x Daily PRN RAQUEL Guerrero      torsemide  5 mg Oral Daily RAQUEL Guerrero          Today, Patient Was Seen By: Lizzie Greene PA-C    **Please Note: This note may have been constructed using a voice recognition system.**

## 2024-08-10 PROBLEM — G93.41 ACUTE METABOLIC ENCEPHALOPATHY: Status: RESOLVED | Noted: 2020-06-13 | Resolved: 2024-08-10

## 2024-08-10 LAB
ANION GAP SERPL CALCULATED.3IONS-SCNC: 4 MMOL/L (ref 4–13)
BASOPHILS # BLD AUTO: 0.02 THOUSANDS/ÂΜL (ref 0–0.1)
BASOPHILS NFR BLD AUTO: 0 % (ref 0–1)
BUN SERPL-MCNC: 25 MG/DL (ref 5–25)
CALCIUM SERPL-MCNC: 9.2 MG/DL (ref 8.4–10.2)
CHLORIDE SERPL-SCNC: 97 MMOL/L (ref 96–108)
CO2 SERPL-SCNC: 40 MMOL/L (ref 21–32)
CREAT SERPL-MCNC: 0.89 MG/DL (ref 0.6–1.3)
EOSINOPHIL # BLD AUTO: 0.02 THOUSAND/ÂΜL (ref 0–0.61)
EOSINOPHIL NFR BLD AUTO: 0 % (ref 0–6)
ERYTHROCYTE [DISTWIDTH] IN BLOOD BY AUTOMATED COUNT: 12.8 % (ref 11.6–15.1)
GFR SERPL CREATININE-BSD FRML MDRD: 77 ML/MIN/1.73SQ M
GLUCOSE SERPL-MCNC: 90 MG/DL (ref 65–140)
HCT VFR BLD AUTO: 38.1 % (ref 36.5–49.3)
HGB BLD-MCNC: 11.2 G/DL (ref 12–17)
IMM GRANULOCYTES # BLD AUTO: 0.02 THOUSAND/UL (ref 0–0.2)
IMM GRANULOCYTES NFR BLD AUTO: 0 % (ref 0–2)
LYMPHOCYTES # BLD AUTO: 1.34 THOUSANDS/ÂΜL (ref 0.6–4.47)
LYMPHOCYTES NFR BLD AUTO: 17 % (ref 14–44)
MCH RBC QN AUTO: 32.3 PG (ref 26.8–34.3)
MCHC RBC AUTO-ENTMCNC: 29.4 G/DL (ref 31.4–37.4)
MCV RBC AUTO: 110 FL (ref 82–98)
MONOCYTES # BLD AUTO: 0.76 THOUSAND/ÂΜL (ref 0.17–1.22)
MONOCYTES NFR BLD AUTO: 9 % (ref 4–12)
NEUTROPHILS # BLD AUTO: 5.96 THOUSANDS/ÂΜL (ref 1.85–7.62)
NEUTS SEG NFR BLD AUTO: 74 % (ref 43–75)
NRBC BLD AUTO-RTO: 0 /100 WBCS
PLATELET # BLD AUTO: 118 THOUSANDS/UL (ref 149–390)
PMV BLD AUTO: 9.8 FL (ref 8.9–12.7)
POTASSIUM SERPL-SCNC: 4.1 MMOL/L (ref 3.5–5.3)
RBC # BLD AUTO: 3.47 MILLION/UL (ref 3.88–5.62)
SODIUM SERPL-SCNC: 141 MMOL/L (ref 135–147)
WBC # BLD AUTO: 8.12 THOUSAND/UL (ref 4.31–10.16)

## 2024-08-10 PROCEDURE — 85025 COMPLETE CBC W/AUTO DIFF WBC: CPT

## 2024-08-10 PROCEDURE — 80048 BASIC METABOLIC PNL TOTAL CA: CPT

## 2024-08-10 PROCEDURE — 99232 SBSQ HOSP IP/OBS MODERATE 35: CPT

## 2024-08-10 PROCEDURE — 94760 N-INVAS EAR/PLS OXIMETRY 1: CPT

## 2024-08-10 PROCEDURE — 94660 CPAP INITIATION&MGMT: CPT

## 2024-08-10 PROCEDURE — 94640 AIRWAY INHALATION TREATMENT: CPT

## 2024-08-10 RX ADMIN — SENNOSIDES AND DOCUSATE SODIUM 1 TABLET: 50; 8.6 TABLET ORAL at 08:49

## 2024-08-10 RX ADMIN — IPRATROPIUM BROMIDE AND ALBUTEROL SULFATE 3 ML: 2.5; .5 SOLUTION RESPIRATORY (INHALATION) at 08:01

## 2024-08-10 RX ADMIN — TORSEMIDE 5 MG: 10 TABLET ORAL at 08:49

## 2024-08-10 RX ADMIN — METHYLPREDNISOLONE SODIUM SUCCINATE 40 MG: 40 INJECTION, POWDER, FOR SOLUTION INTRAMUSCULAR; INTRAVENOUS at 21:28

## 2024-08-10 RX ADMIN — HEPARIN SODIUM 5000 UNITS: 5000 INJECTION INTRAVENOUS; SUBCUTANEOUS at 13:35

## 2024-08-10 RX ADMIN — LOSARTAN POTASSIUM 25 MG: 25 TABLET, FILM COATED ORAL at 21:28

## 2024-08-10 RX ADMIN — FORMOTEROL FUMARATE 20 MCG: 20 SOLUTION RESPIRATORY (INHALATION) at 08:01

## 2024-08-10 RX ADMIN — HEPARIN SODIUM 5000 UNITS: 5000 INJECTION INTRAVENOUS; SUBCUTANEOUS at 05:07

## 2024-08-10 RX ADMIN — HEPARIN SODIUM 5000 UNITS: 5000 INJECTION INTRAVENOUS; SUBCUTANEOUS at 21:28

## 2024-08-10 RX ADMIN — MELATONIN 9 MG: 3 TAB ORAL at 21:28

## 2024-08-10 RX ADMIN — GUAIFENESIN 600 MG: 600 TABLET, EXTENDED RELEASE ORAL at 08:49

## 2024-08-10 RX ADMIN — FORMOTEROL FUMARATE 20 MCG: 20 SOLUTION RESPIRATORY (INHALATION) at 19:33

## 2024-08-10 RX ADMIN — GUAIFENESIN 600 MG: 600 TABLET, EXTENDED RELEASE ORAL at 21:28

## 2024-08-10 RX ADMIN — BUDESONIDE 0.5 MG: 0.5 INHALANT ORAL at 19:33

## 2024-08-10 RX ADMIN — METHYLPREDNISOLONE SODIUM SUCCINATE 40 MG: 40 INJECTION, POWDER, FOR SOLUTION INTRAMUSCULAR; INTRAVENOUS at 08:49

## 2024-08-10 RX ADMIN — IPRATROPIUM BROMIDE AND ALBUTEROL SULFATE 3 ML: 2.5; .5 SOLUTION RESPIRATORY (INHALATION) at 19:33

## 2024-08-10 RX ADMIN — LOSARTAN POTASSIUM 25 MG: 25 TABLET, FILM COATED ORAL at 08:49

## 2024-08-10 RX ADMIN — BUDESONIDE 0.5 MG: 0.5 INHALANT ORAL at 08:01

## 2024-08-10 NOTE — UTILIZATION REVIEW
Initial Clinical Review - Continued    SEE INITIAL REVIEW AT BOTTOM    Date: 08/10/24                          Current Patient Class: Inpatient  Current Level of Care: Med/Surg    HPI:88 y.o. male initially admitted on 8/8/2024 for Acute on chronic respiratory failure with hypoxia and hypercapnia (HCC).      Assessment/Plan:   Day 3: Has surpassed a 2nd midnight with active treatments and services. Reports breathing is improved, family notes pt is at baseline. Exam: frail, diminished breath sounds, oriented to self and place. Plan: IV solu-medrol, BiPAP qHS (of note pt is non-compliant w/ BiPAP per nursing, was willing to wear nasal cannula overnight); aspiration precautions. Continue current meds, Trend labs, replete electrolytes as needed. Continuous pulse ox.       Vital Signs:   Vital Signs (last 3 days)       Date/Time Temp Pulse Resp BP MAP (mmHg) SpO2 FiO2 (%) Calculated FIO2 (%) - Nasal Cannula O2 Flow Rate (L/min) Nasal Cannula O2 Flow Rate (L/min) O2 Device O2 Interface Device Patient Position - Orthostatic VS [place] Coma Scale Score Pain    08/10/24 0841 -- -- -- -- -- 90 % -- -- -- -- -- -- -- -- --    08/10/24 0812 98.7 °F (37.1 °C) 99 20 143/90 111 99 % -- 36 -- 4 L/min Nasal cannula -- Lying -- --    08/10/24 0803 -- -- -- -- -- 96 % -- 36 -- 4 L/min Nasal cannula -- -- -- --    08/10/24 0700 -- -- -- -- -- -- -- -- -- -- -- -- -- -- No Pain    08/10/24 0326 98.2 °F (36.8 °C) 78 20 136/72 96 92 % -- 44 -- 6 L/min Nasal cannula -- Lying -- --    08/09/24 2300 98.1 °F (36.7 °C) 78 21 173/86 122 96 % -- 36 -- 4 L/min Nasal cannula -- Lying -- --    08/09/24 1929 98.5 °F (36.9 °C) 85 20 162/80 107 95 % -- 32 -- 3 L/min Nasal cannula -- Lying -- --    08/09/24 1915 -- -- -- -- -- -- -- -- -- -- -- -- -- 13 No Pain    08/09/24 1544 97.9 °F (36.6 °C) 85 20 150/71 102 91 % -- 32 -- 3 L/min Nasal cannula -- Sitting -- --    08/09/24 1530 -- -- -- -- -- -- -- -- -- -- -- -- -- 13 --    08/09/24 1130 -- -- --  -- -- -- -- -- -- -- -- -- -- 13 --    08/09/24 1129 98 °F (36.7 °C) 84 18 157/78 94 94 % -- -- -- -- CPAP -- Lying -- --    08/09/24 1017 -- 77 -- 169/88 -- -- -- -- -- -- -- -- -- -- --    08/09/24 0800 -- -- -- -- -- -- -- -- -- -- -- -- -- -- No Pain    08/09/24 0730 -- -- -- -- -- -- -- -- -- -- -- -- -- 13 --    08/09/24 0713 -- -- -- -- -- 97 % -- -- -- -- -- Full face mask -- -- --    08/09/24 0711 -- -- -- -- -- 98 % 40 -- -- -- CPAP -- -- -- --    08/09/24 0235 98.2 °F (36.8 °C) 77 18 154/68 103 96 % -- 36 -- 4 L/min Nasal cannula -- Lying -- --    08/09/24 0122 -- -- -- -- -- 96 % -- -- -- -- -- Full face mask -- -- --    08/08/24 2338 98.3 °F (36.8 °C) 80 18 132/60 86 95 % -- 36 -- 4 L/min Nasal cannula -- Lying -- --    08/08/24 1935 99.2 °F (37.3 °C) 87 18 132/63 91 100 % -- 36 -- 4 L/min Nasal cannula -- Lying -- --    08/08/24 1932 -- -- -- -- -- 96 % -- 36 -- 4 L/min Nasal cannula -- -- -- --    08/08/24 1915 -- -- -- -- -- -- -- -- -- -- -- -- -- 14 No Pain    08/08/24 1656 -- -- -- -- -- 94 % -- 32 -- 3 L/min Nasal cannula -- -- -- --    08/08/24 1616 98.5 °F (36.9 °C) 79 20 124/56 86 96 % -- 32 -- 3 L/min Nasal cannula -- Lying -- --    08/08/24 1303 -- -- -- -- -- 95 % -- 32 -- 3 L/min Nasal cannula -- -- -- --    08/08/24 1052 98.1 °F (36.7 °C) 79 20 129/67 76 95 % -- 44 -- 6 L/min Nasal cannula -- Sitting -- --    08/08/24 1009 -- -- -- -- -- 95 % -- 44 -- 6 L/min Nasal cannula -- -- -- --    08/08/24 0750 -- -- -- -- -- 98 % -- -- -- -- -- Full face mask -- -- --    08/08/24 0734 -- -- -- -- -- 100 % 60 -- -- -- BiPAP -- -- -- --    08/08/24 0731 -- 82 20 121/61 86 100 % -- -- -- -- BiPAP -- Lying -- --    08/08/24 0730 -- -- -- -- -- 99 % -- -- -- -- BiPAP -- -- 14 --    08/08/24 0616 97.8 °F (36.6 °C) 88 22 166/98 126 100 % -- 44 -- 6 L/min Nasal cannula -- Lying -- --    08/08/24 0500 -- 88 22 143/65 93 100 % -- -- 15 L/min -- Non-rebreather mask -- Sitting -- --    08/08/24 0412 98.1 °F  (36.7 °C) -- -- -- -- -- -- -- -- -- -- -- -- -- --    08/08/24 0400 -- 95 22 143/70 101 100 % -- 80 -- 15 L/min Non-rebreather mask -- Sitting 14 --    08/08/24 0300 -- 85 20 126/62 89 100 % -- -- -- -- BiPAP -- Sitting -- --    08/08/24 0145 -- 97 27 161/65 105 98 % -- -- -- -- BiPAP -- Sitting -- --    08/08/24 0131 -- -- -- -- -- 99 % -- -- -- -- -- Full face mask -- -- --    08/08/24 0100 -- -- -- -- -- -- -- -- 15 L/min -- Non-rebreather mask -- -- 11 --    08/08/24 0049 100 °F (37.8 °C) 102 22 161/107 -- 94 % -- 52 -- 8 L/min Nasal cannula -- Lying -- --            Pertinent Labs/Diagnostic Results:   Results from last 7 days   Lab Units 08/08/24  0135   SARS-COV-2  Negative     Results from last 7 days   Lab Units 08/10/24  0623 08/09/24  0443 08/08/24  0059   WBC Thousand/uL 8.12 9.79 11.96*   HEMOGLOBIN g/dL 11.2* 10.3* 11.0*   HEMATOCRIT % 38.1 35.4* 38.4   PLATELETS Thousands/uL 118* 108* 102*   TOTAL NEUT ABS Thousands/µL 5.96 8.71* 9.48*         Results from last 7 days   Lab Units 08/10/24  0623 08/09/24  0443 08/08/24  0059   SODIUM mmol/L 141 140 145   POTASSIUM mmol/L 4.1 4.2 3.9   CHLORIDE mmol/L 97 93* 92*   CO2 mmol/L 40* 44* >45*   ANION GAP mmol/L 4 3*  --    BUN mg/dL 25 26* 16   CREATININE mg/dL 0.89 0.85 0.77   EGFR ml/min/1.73sq m 77 78 82   CALCIUM mg/dL 9.2 9.8 9.9     Results from last 7 days   Lab Units 08/08/24  0059   AST U/L 22   ALT U/L 16   ALK PHOS U/L 68   TOTAL PROTEIN g/dL 7.6   ALBUMIN g/dL 4.1   TOTAL BILIRUBIN mg/dL 0.58         Results from last 7 days   Lab Units 08/10/24  0623 08/09/24  0443 08/08/24  0059   GLUCOSE RANDOM mg/dL 90 147* 128     Results from last 7 days   Lab Units 08/09/24  0946 08/08/24  0255 08/08/24  0059   PH DUSTIN  7.406* 7.463* 7.366   PCO2 DUSTIN mm Hg 69.3* 59.0* 82.3*   PO2 DUSTIN mm Hg 86.2* 110.7* 36.5   HCO3 DUSTIN mmol/L 42.5* 41.3* 46.1*   BASE EXC DUSTIN mmol/L 15.0 15.1 16.9   O2 CONTENT DUSTIN ml/dL 15.4 15.9 11.8   O2 HGB, VENOUS % 94.4* 95.7* 67.9      Results from last 7 days   Lab Units 08/08/24  0449 08/08/24  0255 08/08/24  0059   HS TNI 0HR ng/L  --   --  126*   HS TNI 2HR ng/L  --  100*  --    HSTNI D2 ng/L  --  -26  --    HS TNI 4HR ng/L 106*  --   --    HSTNI D4 ng/L -20  --   --          Results from last 7 days   Lab Units 08/08/24  0059   PROTIME seconds 13.5   INR  1.01   PTT seconds 28         Results from last 7 days   Lab Units 08/09/24  0443 08/08/24  0059   PROCALCITONIN ng/ml 0.12 0.06     Results from last 7 days   Lab Units 08/08/24  0059   LACTIC ACID mmol/L 1.1       Results from last 7 days   Lab Units 08/08/24  0059   BNP pg/mL 51       Results from last 7 days   Lab Units 08/09/24  1533   CLARITY UA  Clear   COLOR UA  Light Yellow   SPEC GRAV UA  1.027   PH UA  6.5   GLUCOSE UA mg/dl Negative   KETONES UA mg/dl Negative   BLOOD UA  Negative   PROTEIN UA mg/dl 30 (1+)*   NITRITE UA  Negative   BILIRUBIN UA  Negative   UROBILINOGEN UA (BE) mg/dl <2.0   LEUKOCYTES UA  Negative   WBC UA /hpf 1-2   RBC UA /hpf 1-2   BACTERIA UA /hpf None Seen   EPITHELIAL CELLS WET PREP /hpf Occasional   MUCUS THREADS  Occasional*     Results from last 7 days   Lab Units 08/08/24  0135   INFLUENZA A PCR  Negative   INFLUENZA B PCR  Negative   RSV PCR  Negative       Results from last 7 days   Lab Units 08/08/24  0104 08/08/24  0059   BLOOD CULTURE  No Growth at 48 hrs. No Growth at 48 hrs.         Medications:   Scheduled Medications:  budesonide, 0.5 mg, Nebulization, BID  formoterol, 20 mcg, Nebulization, Q12H  guaiFENesin, 600 mg, Oral, Q12H KIKE  heparin (porcine), 5,000 Units, Subcutaneous, Q8H KIKE  ipratropium-albuterol, 3 mL, Nebulization, BID  losartan, 25 mg, Oral, BID  melatonin, 9 mg, Oral, HS  methylPREDNISolone sodium succinate, 40 mg, Intravenous, Q12H KIKE  pantoprazole, 40 mg, Oral, Early Morning  senna-docusate sodium, 1 tablet, Oral, Daily  torsemide, 5 mg, Oral, Daily    Continuous IV Infusions: none    PRN Meds:  acetaminophen, 650 mg,  Oral, Q4H PRN  albuterol, 2 puff, Inhalation, Q6H PRN  aluminum-magnesium hydroxide-simethicone, 30 mL, Oral, Q6H PRN  bisacodyl, 10 mg, Rectal, Daily PRN  hydrALAZINE, 5 mg, Intravenous, Q8H PRN  simethicone, 80 mg, Oral, 4x Daily PRN          Network Utilization Review Department  ATTENTION: Please call with any questions or concerns to [phone] and carefully listen to the prompts so that you are directed to the right person. All voicemails are confidential.   For Discharge needs, contact Care Management DC Support Team at [phone] opt. [phone]  Send all requests for admission clinical reviews, approved or denied determinations and any other requests to dedicated fax number below belonging to the [facility] where the patient is receiving treatment. List of dedicated fax numbers for the Facilities:  FACILITY NAME UR FAX NUMBER   ADMISSION DENIALS (Administrative/Medical Necessity) [phone]   DISCHARGE SUPPORT TEAM (NETWORK) [phone]   PARENT CHILD HEALTH (Maternity/NICU/Pediatrics) [phone]   [facility] [phone]   [facility] [phone]   [facility] [phone]   [facility] [phone]   [facility] [phone]   [facility] [phone]   [facility] [phone]   [facility] [phone]   [facility] [phone]   [facility] [phone]   [facility] [phone]   [facility] [phone]

## 2024-08-10 NOTE — PROGRESS NOTES
[facility]  Progress Note  Name: Severiano Feliciano I  MRN: [id number]  Unit/Bed#: E4 MS Franko I Date of Admission: 8/8/2024   Date of Service: 8/10/2024 I Hospital Day: 2    Assessment & Plan   * Acute on chronic respiratory failure with hypoxia and hypercapnia (HCC)  Assessment & Plan  Patient presented due to AMS. Frequent readmissions for COPD exacerbation and respiratory failure.  History of GOLD E COPD and chronic respiratory failure maintained on 3 LNC (wife reports up to 4 L at times) and nocturnal CPAP.   CT neg for PE. Right lower lobe airspace consolidation with air bronchograms, similar to slightly increased since prior study, which may be due to pneumonia, likely aspiration pneumonia given the mucous/debris in the trachea. Repeat chest CT recommended in 3 months  Continue budesonide, DuoNeb qid,  and proformist BID, PRN albuterol, and IV solumedrol  Plan to transition to oral prednisone 40 mg tomorrow  Continue BIPAP at bedtime and as needed during the day  SLP recommending dysphagia 3 soft diet   Aspiration precautions  Pulmonary consulted signed off transition to oral steroids tomorrow and resume pulmonary rehab    Low grade fever  Assessment & Plan  Presented with low-grade fever 100.5.  WBC 11.9.  Did not meet SIRS criteria  Likely secondary to aspiration pneumonitis   Normal PCT x2 discontinue iv antibiotics   UA unremarkable   Blood cultures negative   Afebrile since admission    COPD, severe (HCC)  Assessment & Plan  History of Gold E COPD. PTA maintained on Budesonide BID and Duonebs TID with PRN albuterol in between as needed  Resume pulmonary rehab at discharge  Poor prognosis recommended palliative care by pulmonology during last office visit   Continue goals of care discussions with wife and patient's son. Discussed with wife today using Ukrainian translation, attending physician discussed with patient's son. Information on palliative care and hospice  provided  See respiratory failure above    Acute metabolic encephalopathy-resolved as of 8/10/2024  Assessment & Plan  Encephalopathy in setting of hypoxia and hypercapnia.  Previous admissions with same presentation  Hypercapnia on VBG improving   CT head negative for acute intracranial abnormality  Patient back to baseline today per wife. Oriented to person, place    Elevated troponin  Assessment & Plan  Troponin 126-->100--> 106,  likely demand ischemia from respiratory failure  EKG negative for acute ischemic changes  Received aspirin bolus in ED    Bilateral hearing loss  Assessment & Plan  Follows with ENT.  Noncompliant with use of hearing aids    Benign essential hypertension  Assessment & Plan  Losartan and torsemide    History of DVT (deep vein thrombosis)  Assessment & Plan  DVT ppx with SQ heparin           VTE Pharmacologic Prophylaxis: VTE Score: 7 Moderate Risk (Score 3-4) - Pharmacological DVT Prophylaxis Ordered: heparin.    Mobility:   Basic Mobility Inpatient Raw Score: 12  JH-HLM Goal: 4: Move to chair/commode  JH-HLM Achieved: 3: Sit at edge of bed  JH-HLM Goal NOT achieved. Continue with multidisciplinary rounding and encourage appropriate mobility to improve upon JH-HLM goals.    Patient Centered Rounds: I performed bedside rounds with nursing staff today.   Discussions with Specialists or Other Care Team Provider: cm    Education and Discussions with Family / Patient: Updated  (wife) at bedside.    Total Time Spent on Date of Encounter in care of patient: 30 mins. This time was spent on one or more of the following: performing physical exam; counseling and coordination of care; obtaining or reviewing history; documenting in the medical record; reviewing/ordering tests, medications or procedures; communicating with other healthcare professionals and discussing with patient's family/caregivers.    Current Length of Stay: 2 day(s)  Current Patient Status: Inpatient   Certification  Statement: The patient will continue to require additional inpatient hospital stay due to copd exacerbation requiring iv steroids   Discharge Plan: Anticipate discharge tomorrow to discharge location to be determined pending rehab evaluations.    Code Status: Level 1 - Full Code    Subjective:   Patient was seen sitting in chair at bedside with wife present. Turkish translation was used during interview. Patient denied being in pain. He reported that he felt his breathing was improved. He denies any acute complaints. Wife reports that patient is back to baseline.     Objective:     Vitals:   Temp (24hrs), Av.1 °F (36.7 °C), Min:97.4 °F (36.3 °C), Max:98.7 °F (37.1 °C)    Temp:  [97.4 °F (36.3 °C)-98.7 °F (37.1 °C)] 97.4 °F (36.3 °C)  HR:  [75-99] 75  Resp:  [20-21] 20  BP: (136-174)/(68-90) 145/68  SpO2:  [90 %-99 %] 95 %  There is no height or weight on file to calculate BMI.     Input and Output Summary (last 24 hours):     Intake/Output Summary (Last 24 hours) at 8/10/2024 1515  Last data filed at 2024 1601  Gross per 24 hour   Intake --   Output 350 ml   Net -350 ml       Physical Exam:   Physical Exam  Vitals and nursing note reviewed.   Constitutional:       Appearance: He is ill-appearing (chronically).      Comments: Frail    HENT:      Head: Normocephalic and atraumatic.   Eyes:      General: No scleral icterus.  Cardiovascular:      Rate and Rhythm: Normal rate and regular rhythm.   Pulmonary:      Effort: Pulmonary effort is normal.      Comments: Poor air movement, diminished breath sounds,  Abdominal:      General: Abdomen is flat. Bowel sounds are normal.      Palpations: Abdomen is soft.   Musculoskeletal:      Right lower leg: No edema.      Left lower leg: No edema.   Skin:     General: Skin is warm and dry.   Neurological:      Mental Status: He is alert.      Comments: Oriented to person and place    Psychiatric:         Mood and Affect: Mood normal.         Behavior: Behavior normal.        Additional Data:     Labs:  Results from last 7 days   Lab Units 08/10/24  0623   WBC Thousand/uL 8.12   HEMOGLOBIN g/dL 11.2*   HEMATOCRIT % 38.1   PLATELETS Thousands/uL 118*   SEGS PCT % 74   LYMPHO PCT % 17   MONO PCT % 9   EOS PCT % 0     Results from last 7 days   Lab Units 08/10/24  0623 08/09/24  0443 08/08/24  0059   SODIUM mmol/L 141   < > 145   POTASSIUM mmol/L 4.1   < > 3.9   CHLORIDE mmol/L 97   < > 92*   CO2 mmol/L 40*   < > >45*   BUN mg/dL 25   < > 16   CREATININE mg/dL 0.89   < > 0.77   ANION GAP mmol/L 4   < >  --    CALCIUM mg/dL 9.2   < > 9.9   ALBUMIN g/dL  --   --  4.1   TOTAL BILIRUBIN mg/dL  --   --  0.58   ALK PHOS U/L  --   --  68   ALT U/L  --   --  16   AST U/L  --   --  22   GLUCOSE RANDOM mg/dL 90   < > 128    < > = values in this interval not displayed.     Results from last 7 days   Lab Units 08/08/24  0059   INR  1.01             Results from last 7 days   Lab Units 08/09/24  0443 08/08/24  0059   LACTIC ACID mmol/L  --  1.1   PROCALCITONIN ng/ml 0.12 0.06       Lines/Drains:  Invasive Devices       Peripheral Intravenous Line  Duration             Peripheral IV 08/08/24 Left;Proximal;Ventral (anterior) Forearm 2 days    Peripheral IV 08/08/24 Right Antecubital 2 days                          Imaging: No pertinent imaging reviewed.    Recent Cultures (last 7 days):   Results from last 7 days   Lab Units 08/08/24  0104 08/08/24  0059   BLOOD CULTURE  No Growth at 48 hrs. No Growth at 48 hrs.       Last 24 Hours Medication List:   Current Facility-Administered Medications   Medication Dose Route Frequency Provider Last Rate    acetaminophen  650 mg Oral Q4H PRN RAQUEL Guerrero      albuterol  2 puff Inhalation Q6H PRN RAQUEL Guerrero      aluminum-magnesium hydroxide-simethicone  30 mL Oral Q6H PRN RAQUEL Guerrero      bisacodyl  10 mg Rectal Daily PRN RAQUEL Guerrero      budesonide  0.5 mg Nebulization BID RAQUEL Guerrero       formoterol  20 mcg Nebulization Q12H Gunnar Zacarias MD      guaiFENesin  600 mg Oral Q12H [facility] García Herrmann,       heparin (porcine)  5,000 Units Subcutaneous Q8H [facility] RAQUEL Guerrero      hydrALAZINE  5 mg Intravenous Q8H PRN Lizzie Greene PA-C      ipratropium-albuterol  3 mL Nebulization BID García Herrmann, DO      losartan  25 mg Oral BID RAQUEL Guerrero      melatonin  9 mg Oral HS RAQUEL Guerrero      methylPREDNISolone sodium succinate  40 mg Intravenous Q12H [facility] Gunnar Zacarias MD      pantoprazole  40 mg Oral Early Morning RAQUEL Guerrero      senna-docusate sodium  1 tablet Oral Daily RAQUEL Guerrero      simethicone  80 mg Oral 4x Daily PRN RAQUEL Guerrero      torsemide  5 mg Oral Daily RAQUEL Guerrero          Today, Patient Was Seen By: Lizzie Greene PA-C    **Please Note: This note may have been constructed using a voice recognition system.**

## 2024-08-10 NOTE — PLAN OF CARE
Problem: Prexisting or High Potential for Compromised Skin Integrity  Goal: Skin integrity is maintained or improved  Description: INTERVENTIONS:  - Identify patients at risk for skin breakdown  - Assess and monitor skin integrity  - Assess and monitor nutrition and hydration status  - Monitor labs   - Assess for incontinence   - Turn and reposition patient  - Assist with mobility/ambulation  - Relieve pressure over bony prominences  - Avoid friction and shearing  - Provide appropriate hygiene as needed including keeping skin clean and dry  - Evaluate need for skin moisturizer/barrier cream  - Collaborate with interdisciplinary team   - Patient/family teaching  - Consider wound care consult   Outcome: Progressing     Problem: PAIN - ADULT  Goal: Verbalizes/displays adequate comfort level or baseline comfort level  Description: Interventions:  - Encourage patient to monitor pain and request assistance  - Assess pain using appropriate pain scale  - Administer analgesics based on type and severity of pain and evaluate response  - Implement non-pharmacological measures as appropriate and evaluate response  - Consider cultural and social influences on pain and pain management  - Notify physician/advanced practitioner if interventions unsuccessful or patient reports new pain  Outcome: Progressing     Problem: INFECTION - ADULT  Goal: Absence or prevention of progression during hospitalization  Description: INTERVENTIONS:  - Assess and monitor for signs and symptoms of infection  - Monitor lab/diagnostic results  - Monitor all insertion sites, i.e. indwelling lines, tubes, and drains  - Monitor endotracheal if appropriate and nasal secretions for changes in amount and color  - [place] appropriate cooling/warming therapies per order  - Administer medications as ordered  - Instruct and encourage patient and family to use good hand hygiene technique  - Identify and instruct in appropriate isolation precautions for  identified infection/condition  Outcome: Progressing  Goal: Absence of fever/infection during neutropenic period  Description: INTERVENTIONS:  - Monitor WBC    Outcome: Progressing     Problem: SAFETY ADULT  Goal: Patient will remain free of falls  Description: INTERVENTIONS:  - Educate patient/family on patient safety including physical limitations  - Instruct patient to call for assistance with activity   - Consult OT/PT to assist with strengthening/mobility   - Keep Call bell within reach  - Keep bed low and locked with side rails adjusted as appropriate  - Keep care items and personal belongings within reach  - Initiate and maintain comfort rounds  - Make Fall Risk Sign visible to staff  - Offer Toileting every  Hours, in advance of need  - Initiate/Maintain alarm  - Obtain necessary fall risk management equipment:   - Apply yellow socks and bracelet for high fall risk patients  - Consider moving patient to room near nurses station  Outcome: Progressing  Goal: Maintain or return to baseline ADL function  Description: INTERVENTIONS:  -  Assess patient's ability to carry out ADLs; assess patient's baseline for ADL function and identify physical deficits which impact ability to perform ADLs (bathing, care of mouth/teeth, toileting, grooming, dressing, etc.)  - Assess/evaluate cause of self-care deficits   - Assess range of motion  - Assess patient's mobility; develop plan if impaired  - Assess patient's need for assistive devices and provide as appropriate  - Encourage maximum independence but intervene and supervise when necessary  - Involve family in performance of ADLs  - Assess for home care needs following discharge   - Consider OT consult to assist with ADL evaluation and planning for discharge  - Provide patient education as appropriate  Outcome: Progressing  Goal: Maintains/Returns to pre admission functional level  Description: INTERVENTIONS:  - Perform AM-PAC 6 Click Basic Mobility/ Daily Activity  assessment daily.  - Set and communicate daily mobility goal to care team and patient/family/caregiver.   - Collaborate with rehabilitation services on mobility goals if consulted  - Perform Range of Motion  times a day.  - Reposition patient every  hours.  - Dangle patient  times a day  - Stand patient  times a day  - Ambulate patient  times a day  - Out of bed to chair  times a day   - Out of bed for meals  times a day  - Out of bed for toileting  - Record patient progress and toleration of activity level   Outcome: Progressing     Problem: DISCHARGE PLANNING  Goal: Discharge to home or other facility with appropriate resources  Description: INTERVENTIONS:  - Identify barriers to discharge w/patient and caregiver  - Arrange for needed discharge resources and transportation as appropriate  - Identify discharge learning needs (meds, wound care, etc.)  - Arrange for interpretive services to assist at discharge as needed  - Refer to Case Management Department for coordinating discharge planning if the patient needs post-hospital services based on physician/advanced practitioner order or complex needs related to functional status, cognitive ability, or social support system  Outcome: Progressing     Problem: Knowledge Deficit  Goal: Patient/family/caregiver demonstrates understanding of disease process, treatment plan, medications, and discharge instructions  Description: Complete learning assessment and assess knowledge base.  Interventions:  - Provide teaching at level of understanding  - Provide teaching via preferred learning methods  Outcome: Progressing     Problem: Nutrition/Hydration-ADULT  Goal: Nutrient/Hydration intake appropriate for improving, restoring or maintaining nutritional needs  Description: Monitor and assess patient's nutrition/hydration status for malnutrition. Collaborate with interdisciplinary team and initiate plan and interventions as ordered.  Monitor patient's weight and dietary intake as  ordered or per policy. Utilize nutrition screening tool and intervene as necessary. Determine patient's food preferences and provide high-protein, high-caloric foods as appropriate.     INTERVENTIONS:  - Monitor oral intake, urinary output, labs, and treatment plans  - Assess nutrition and hydration status and recommend course of action  - Evaluate amount of meals eaten  - Assist patient with eating if necessary   - Allow adequate time for meals  - Recommend/ encourage appropriate diets, oral nutritional supplements, and vitamin/mineral supplements  - Order, calculate, and assess calorie counts as needed  - Recommend, monitor, and adjust tube feedings and TPN/PPN based on assessed needs  - Assess need for intravenous fluids  - Provide specific nutrition/hydration education as appropriate  - Include patient/family/caregiver in decisions related to nutrition  Outcome: Progressing     Problem: SAFETY,RESTRAINT: NV/NON-SELF DESTRUCTIVE BEHAVIOR  Goal: Remains free of harm/injury (restraint for non violent/non self-detsructive behavior)  Description: INTERVENTIONS:  - Instruct patient/family regarding restraint use   - Assess and monitor physiologic and psychological status   - Provide interventions and comfort measures to meet assessed patient needs   - Identify and implement measures to help patient regain control  - Assess readiness for release of restraint   Outcome: Progressing  Goal: Returns to optimal restraint-free functioning  Description: INTERVENTIONS:  - Assess the patient's behavior and symptoms that indicate continued need for restraint  - Identify and implement measures to help patient regain control  - Assess readiness for release of restraint   Outcome: Progressing

## 2024-08-10 NOTE — PLAN OF CARE
Problem: Prexisting or High Potential for Compromised Skin Integrity  Goal: Skin integrity is maintained or improved  Description: INTERVENTIONS:  - Identify patients at risk for skin breakdown  - Assess and monitor skin integrity  - Assess and monitor nutrition and hydration status  - Monitor labs   - Assess for incontinence   - Turn and reposition patient  - Assist with mobility/ambulation  - Relieve pressure over bony prominences  - Avoid friction and shearing  - Provide appropriate hygiene as needed including keeping skin clean and dry  - Evaluate need for skin moisturizer/barrier cream  - Collaborate with interdisciplinary team   - Patient/family teaching  - Consider wound care consult   Outcome: Progressing     Problem: PAIN - ADULT  Goal: Verbalizes/displays adequate comfort level or baseline comfort level  Description: Interventions:  - Encourage patient to monitor pain and request assistance  - Assess pain using appropriate pain scale  - Administer analgesics based on type and severity of pain and evaluate response  - Implement non-pharmacological measures as appropriate and evaluate response  - Consider cultural and social influences on pain and pain management  - Notify physician/advanced practitioner if interventions unsuccessful or patient reports new pain  Outcome: Progressing     Problem: INFECTION - ADULT  Goal: Absence or prevention of progression during hospitalization  Description: INTERVENTIONS:  - Assess and monitor for signs and symptoms of infection  - Monitor lab/diagnostic results  - Monitor all insertion sites, i.e. indwelling lines, tubes, and drains  - Monitor endotracheal if appropriate and nasal secretions for changes in amount and color  - [place] appropriate cooling/warming therapies per order  - Administer medications as ordered  - Instruct and encourage patient and family to use good hand hygiene technique  - Identify and instruct in appropriate isolation precautions for  identified infection/condition  Outcome: Progressing  Goal: Absence of fever/infection during neutropenic period  Description: INTERVENTIONS:  - Monitor WBC    Outcome: Progressing     Problem: SAFETY ADULT  Goal: Patient will remain free of falls  Description: INTERVENTIONS:  - Educate patient/family on patient safety including physical limitations  - Instruct patient to call for assistance with activity   - Consult OT/PT to assist with strengthening/mobility   - Keep Call bell within reach  - Keep bed low and locked with side rails adjusted as appropriate  - Keep care items and personal belongings within reach  - Initiate and maintain comfort rounds  - Make Fall Risk Sign visible to staff  - Offer Toileting every 2 Hours, in advance of need  - Initiate/Maintain bed alarm  - Obtain necessary fall risk management equipment: In place   - Apply yellow socks and bracelet for high fall risk patients  - Consider moving patient to room near nurses station  Outcome: Progressing  Goal: Maintain or return to baseline ADL function  Description: INTERVENTIONS:  -  Assess patient's ability to carry out ADLs; assess patient's baseline for ADL function and identify physical deficits which impact ability to perform ADLs (bathing, care of mouth/teeth, toileting, grooming, dressing, etc.)  - Assess/evaluate cause of self-care deficits   - Assess range of motion  - Assess patient's mobility; develop plan if impaired  - Assess patient's need for assistive devices and provide as appropriate  - Encourage maximum independence but intervene and supervise when necessary  - Involve family in performance of ADLs  - Assess for home care needs following discharge   - Consider OT consult to assist with ADL evaluation and planning for discharge  - Provide patient education as appropriate  Outcome: Progressing  Goal: Maintains/Returns to pre admission functional level  Description: INTERVENTIONS:  - Perform AM-PAC 6 Click Basic Mobility/ Daily  Activity assessment daily.  - Set and communicate daily mobility goal to care team and patient/family/caregiver.   - Collaborate with rehabilitation services on mobility goals if consulted  - Perform Range of Motion 3 times a day.  - Reposition patient every 2 hours.  - Dangle patient 3 times a day  - Stand patient 3 times a day  - Ambulate patient 3 times a day  - Out of bed to chair 3 times a day   - Out of bed for meals 3 times a day  - Out of bed for toileting  - Record patient progress and toleration of activity level   Outcome: Progressing     Problem: DISCHARGE PLANNING  Goal: Discharge to home or other facility with appropriate resources  Description: INTERVENTIONS:  - Identify barriers to discharge w/patient and caregiver  - Arrange for needed discharge resources and transportation as appropriate  - Identify discharge learning needs (meds, wound care, etc.)  - Arrange for interpretive services to assist at discharge as needed  - Refer to Case Management Department for coordinating discharge planning if the patient needs post-hospital services based on physician/advanced practitioner order or complex needs related to functional status, cognitive ability, or social support system  Outcome: Progressing     Problem: Knowledge Deficit  Goal: Patient/family/caregiver demonstrates understanding of disease process, treatment plan, medications, and discharge instructions  Description: Complete learning assessment and assess knowledge base.  Interventions:  - Provide teaching at level of understanding  - Provide teaching via preferred learning methods  Outcome: Progressing     Problem: Nutrition/Hydration-ADULT  Goal: Nutrient/Hydration intake appropriate for improving, restoring or maintaining nutritional needs  Description: Monitor and assess patient's nutrition/hydration status for malnutrition. Collaborate with interdisciplinary team and initiate plan and interventions as ordered.  Monitor patient's weight and  dietary intake as ordered or per policy. Utilize nutrition screening tool and intervene as necessary. Determine patient's food preferences and provide high-protein, high-caloric foods as appropriate.     INTERVENTIONS:  - Monitor oral intake, urinary output, labs, and treatment plans  - Assess nutrition and hydration status and recommend course of action  - Evaluate amount of meals eaten  - Assist patient with eating if necessary   - Allow adequate time for meals  - Recommend/ encourage appropriate diets, oral nutritional supplements, and vitamin/mineral supplements  - Order, calculate, and assess calorie counts as needed  - Recommend, monitor, and adjust tube feedings and TPN/PPN based on assessed needs  - Assess need for intravenous fluids  - Provide specific nutrition/hydration education as appropriate  - Include patient/family/caregiver in decisions related to nutrition  Outcome: Progressing     Problem: SAFETY,RESTRAINT: NV/NON-SELF DESTRUCTIVE BEHAVIOR  Goal: Remains free of harm/injury (restraint for non violent/non self-detsructive behavior)  Description: INTERVENTIONS:  - Instruct patient/family regarding restraint use   - Assess and monitor physiologic and psychological status   - Provide interventions and comfort measures to meet assessed patient needs   - Identify and implement measures to help patient regain control  - Assess readiness for release of restraint   Outcome: Progressing  Goal: Returns to optimal restraint-free functioning  Description: INTERVENTIONS:  - Assess the patient's behavior and symptoms that indicate continued need for restraint  - Identify and implement measures to help patient regain control  - Assess readiness for release of restraint   Outcome: Progressing

## 2024-08-10 NOTE — ASSESSMENT & PLAN NOTE
Encephalopathy in setting of hypoxia and hypercapnia.  Previous admissions with same presentation  Hypercapnia on VBG improving   CT head negative for acute intracranial abnormality  Patient back to baseline today per wife. Oriented to person, place

## 2024-08-10 NOTE — ASSESSMENT & PLAN NOTE
History of Gold E COPD. PTA maintained on Budesonide BID and Duonebs TID with PRN albuterol in between as needed  Resume pulmonary rehab at discharge  Poor prognosis recommended palliative care by pulmonology during last office visit   Continue goals of care discussions with wife and patient's son. Discussed with wife today using Gabonese translation, attending physician discussed with patient's son. Information on palliative care and hospice provided  See respiratory failure above

## 2024-08-10 NOTE — ASSESSMENT & PLAN NOTE
Presented with low-grade fever 100.5.  WBC 11.9.  Did not meet SIRS criteria  Likely secondary to aspiration pneumonitis   Normal PCT x2 discontinue iv antibiotics   UA unremarkable   Blood cultures negative   Afebrile since admission

## 2024-08-10 NOTE — ASSESSMENT & PLAN NOTE
Patient presented due to AMS. Frequent readmissions for COPD exacerbation and respiratory failure.  History of GOLD E COPD and chronic respiratory failure maintained on 3 LNC (wife reports up to 4 L at times) and nocturnal CPAP.   CT neg for PE. Right lower lobe airspace consolidation with air bronchograms, similar to slightly increased since prior study, which may be due to pneumonia, likely aspiration pneumonia given the mucous/debris in the trachea. Repeat chest CT recommended in 3 months  Continue budesonide, DuoNeb qid,  and proformist BID, PRN albuterol, and IV solumedrol  Plan to transition to oral prednisone 40 mg tomorrow  Continue BIPAP at bedtime and as needed during the day  SLP recommending dysphagia 3 soft diet   Aspiration precautions  Pulmonary consulted signed off transition to oral steroids tomorrow and resume pulmonary rehab

## 2024-08-11 VITALS
RESPIRATION RATE: 20 BRPM | DIASTOLIC BLOOD PRESSURE: 89 MMHG | TEMPERATURE: 98.2 F | OXYGEN SATURATION: 90 % | HEART RATE: 97 BPM | SYSTOLIC BLOOD PRESSURE: 147 MMHG

## 2024-08-11 PROBLEM — R50.9 LOW GRADE FEVER: Status: RESOLVED | Noted: 2024-08-08 | Resolved: 2024-08-11

## 2024-08-11 LAB
BUN SERPL-MCNC: 32 MG/DL (ref 5–25)
CALCIUM SERPL-MCNC: 9.9 MG/DL (ref 8.4–10.2)
CHLORIDE SERPL-SCNC: 92 MMOL/L (ref 96–108)
CO2 SERPL-SCNC: >45 MMOL/L (ref 21–32)
CREAT SERPL-MCNC: 1.05 MG/DL (ref 0.6–1.3)
ERYTHROCYTE [DISTWIDTH] IN BLOOD BY AUTOMATED COUNT: 12.6 % (ref 11.6–15.1)
GFR SERPL CREATININE-BSD FRML MDRD: 63 ML/MIN/1.73SQ M
GLUCOSE SERPL-MCNC: 187 MG/DL (ref 65–140)
HCT VFR BLD AUTO: 39.9 % (ref 36.5–49.3)
HGB BLD-MCNC: 11.7 G/DL (ref 12–17)
MCH RBC QN AUTO: 30.7 PG (ref 26.8–34.3)
MCHC RBC AUTO-ENTMCNC: 29.3 G/DL (ref 31.4–37.4)
MCV RBC AUTO: 105 FL (ref 82–98)
PLATELET # BLD AUTO: 142 THOUSANDS/UL (ref 149–390)
PMV BLD AUTO: 9.9 FL (ref 8.9–12.7)
POTASSIUM SERPL-SCNC: 4.9 MMOL/L (ref 3.5–5.3)
RBC # BLD AUTO: 3.81 MILLION/UL (ref 3.88–5.62)
SODIUM SERPL-SCNC: 142 MMOL/L (ref 135–147)
WBC # BLD AUTO: 7.03 THOUSAND/UL (ref 4.31–10.16)

## 2024-08-11 PROCEDURE — 85027 COMPLETE CBC AUTOMATED: CPT

## 2024-08-11 PROCEDURE — 94761 N-INVAS EAR/PLS OXIMETRY MLT: CPT

## 2024-08-11 PROCEDURE — 94640 AIRWAY INHALATION TREATMENT: CPT

## 2024-08-11 PROCEDURE — 99239 HOSP IP/OBS DSCHRG MGMT >30: CPT

## 2024-08-11 PROCEDURE — 94760 N-INVAS EAR/PLS OXIMETRY 1: CPT

## 2024-08-11 PROCEDURE — 80048 BASIC METABOLIC PNL TOTAL CA: CPT

## 2024-08-11 RX ORDER — PREDNISONE 20 MG/1
TABLET ORAL
Qty: 15 TABLET | Refills: 0 | Status: SHIPPED | OUTPATIENT
Start: 2024-08-12 | End: 2024-08-24

## 2024-08-11 RX ORDER — GUAIFENESIN 600 MG/1
600 TABLET, EXTENDED RELEASE ORAL EVERY 12 HOURS SCHEDULED
Qty: 14 TABLET | Refills: 0 | Status: SHIPPED | OUTPATIENT
Start: 2024-08-11 | End: 2024-08-18

## 2024-08-11 RX ORDER — PREDNISONE 20 MG/1
40 TABLET ORAL DAILY
Status: DISCONTINUED | OUTPATIENT
Start: 2024-08-12 | End: 2024-08-11 | Stop reason: HOSPADM

## 2024-08-11 RX ADMIN — LOSARTAN POTASSIUM 25 MG: 25 TABLET, FILM COATED ORAL at 08:11

## 2024-08-11 RX ADMIN — GUAIFENESIN 600 MG: 600 TABLET, EXTENDED RELEASE ORAL at 08:11

## 2024-08-11 RX ADMIN — SENNOSIDES AND DOCUSATE SODIUM 1 TABLET: 50; 8.6 TABLET ORAL at 08:11

## 2024-08-11 RX ADMIN — PANTOPRAZOLE SODIUM 40 MG: 40 TABLET, DELAYED RELEASE ORAL at 06:40

## 2024-08-11 RX ADMIN — METHYLPREDNISOLONE SODIUM SUCCINATE 40 MG: 40 INJECTION, POWDER, FOR SOLUTION INTRAMUSCULAR; INTRAVENOUS at 08:15

## 2024-08-11 RX ADMIN — HEPARIN SODIUM 5000 UNITS: 5000 INJECTION INTRAVENOUS; SUBCUTANEOUS at 06:40

## 2024-08-11 RX ADMIN — IPRATROPIUM BROMIDE AND ALBUTEROL SULFATE 3 ML: 2.5; .5 SOLUTION RESPIRATORY (INHALATION) at 07:18

## 2024-08-11 RX ADMIN — FORMOTEROL FUMARATE 20 MCG: 20 SOLUTION RESPIRATORY (INHALATION) at 07:18

## 2024-08-11 RX ADMIN — TORSEMIDE 5 MG: 10 TABLET ORAL at 08:11

## 2024-08-11 RX ADMIN — BUDESONIDE 0.5 MG: 0.5 INHALANT ORAL at 07:18

## 2024-08-11 NOTE — PLAN OF CARE
Problem: Prexisting or High Potential for Compromised Skin Integrity  Goal: Skin integrity is maintained or improved  Description: INTERVENTIONS:  - Identify patients at risk for skin breakdown  - Assess and monitor skin integrity  - Assess and monitor nutrition and hydration status  - Monitor labs   - Assess for incontinence   - Turn and reposition patient  - Assist with mobility/ambulation  - Relieve pressure over bony prominences  - Avoid friction and shearing  - Provide appropriate hygiene as needed including keeping skin clean and dry  - Evaluate need for skin moisturizer/barrier cream  - Collaborate with interdisciplinary team   - Patient/family teaching  - Consider wound care consult   8/11/2024 0538 by Erin Moreland  Outcome: Progressing  8/11/2024 0536 by Erin Moreland  Outcome: Progressing     Problem: PAIN - ADULT  Goal: Verbalizes/displays adequate comfort level or baseline comfort level  Description: Interventions:  - Encourage patient to monitor pain and request assistance  - Assess pain using appropriate pain scale  - Administer analgesics based on type and severity of pain and evaluate response  - Implement non-pharmacological measures as appropriate and evaluate response  - Consider cultural and social influences on pain and pain management  - Notify physician/advanced practitioner if interventions unsuccessful or patient reports new pain  8/11/2024 0538 by Erin Moreland  Outcome: Progressing  8/11/2024 0536 by Erin Moreland  Outcome: Progressing     Problem: INFECTION - ADULT  Goal: Absence or prevention of progression during hospitalization  Description: INTERVENTIONS:  - Assess and monitor for signs and symptoms of infection  - Monitor lab/diagnostic results  - Monitor all insertion sites, i.e. indwelling lines, tubes, and drains  - Monitor endotracheal if appropriate and nasal secretions for changes in amount and color  - [place] appropriate cooling/warming therapies per order  - Administer  medications as ordered  - Instruct and encourage patient and family to use good hand hygiene technique  - Identify and instruct in appropriate isolation precautions for identified infection/condition  8/11/2024 0538 by Erin Moreland  Outcome: Progressing  8/11/2024 0536 by Erin Moreland  Outcome: Progressing  Goal: Absence of fever/infection during neutropenic period  Description: INTERVENTIONS:  - Monitor WBC    8/11/2024 0538 by Erin Moreland  Outcome: Progressing  8/11/2024 0536 by Erin Moreland  Outcome: Progressing     Problem: SAFETY ADULT  Goal: Patient will remain free of falls  Description: INTERVENTIONS:  - Educate patient/family on patient safety including physical limitations  - Instruct patient to call for assistance with activity   - Consult OT/PT to assist with strengthening/mobility   - Keep Call bell within reach  - Keep bed low and locked with side rails adjusted as appropriate  - Keep care items and personal belongings within reach  - Initiate and maintain comfort rounds  - Make Fall Risk Sign visible to staff  - Offer Toileting every 2 Hours, in advance of need  - Initiate/Maintain bed alarm  - Obtain necessary fall risk management equipment: alarms  - Apply yellow socks and bracelet for high fall risk patients  - Consider moving patient to room near nurses station  8/11/2024 0538 by Erin Moreland  Outcome: Progressing  8/11/2024 0536 by Erin Moreland  Outcome: Progressing  Goal: Maintain or return to baseline ADL function  Description: INTERVENTIONS:  -  Assess patient's ability to carry out ADLs; assess patient's baseline for ADL function and identify physical deficits which impact ability to perform ADLs (bathing, care of mouth/teeth, toileting, grooming, dressing, etc.)  - Assess/evaluate cause of self-care deficits   - Assess range of motion  - Assess patient's mobility; develop plan if impaired  - Assess patient's need for assistive devices and provide as appropriate  - Encourage maximum  independence but intervene and supervise when necessary  - Involve family in performance of ADLs  - Assess for home care needs following discharge   - Consider OT consult to assist with ADL evaluation and planning for discharge  - Provide patient education as appropriate  8/11/2024 0538 by Erin Moreland  Outcome: Progressing  8/11/2024 0536 by Erin Moreland  Outcome: Progressing  Goal: Maintains/Returns to pre admission functional level  Description: INTERVENTIONS:  - Perform AM-PAC 6 Click Basic Mobility/ Daily Activity assessment daily.  - Set and communicate daily mobility goal to care team and patient/family/caregiver.   - Collaborate with rehabilitation services on mobility goals if consulted  - Perform Range of Motion 4 times a day.  - Reposition patient every 2 hours.  - Dangle patient 3 times a day  - Stand patient 3 times a day  - Ambulate patient 3 times a day  - Out of bed to chair 3 times a day   - Out of bed for meals 3 times a day  - Out of bed for toileting  - Record patient progress and toleration of activity level   8/11/2024 0538 by Erin Moreland  Outcome: Progressing  8/11/2024 0536 by Erin Moreland  Outcome: Progressing     Problem: DISCHARGE PLANNING  Goal: Discharge to home or other facility with appropriate resources  Description: INTERVENTIONS:  - Identify barriers to discharge w/patient and caregiver  - Arrange for needed discharge resources and transportation as appropriate  - Identify discharge learning needs (meds, wound care, etc.)  - Arrange for interpretive services to assist at discharge as needed  - Refer to Case Management Department for coordinating discharge planning if the patient needs post-hospital services based on physician/advanced practitioner order or complex needs related to functional status, cognitive ability, or social support system  8/11/2024 0538 by Erin Moreland  Outcome: Progressing  8/11/2024 0536 by Erin Moreland  Outcome: Progressing     Problem: Knowledge  Deficit  Goal: Patient/family/caregiver demonstrates understanding of disease process, treatment plan, medications, and discharge instructions  Description: Complete learning assessment and assess knowledge base.  Interventions:  - Provide teaching at level of understanding  - Provide teaching via preferred learning methods  8/11/2024 0538 by Erin Moreland  Outcome: Progressing  8/11/2024 0536 by Erin Moreland  Outcome: Progressing     Problem: Nutrition/Hydration-ADULT  Goal: Nutrient/Hydration intake appropriate for improving, restoring or maintaining nutritional needs  Description: Monitor and assess patient's nutrition/hydration status for malnutrition. Collaborate with interdisciplinary team and initiate plan and interventions as ordered.  Monitor patient's weight and dietary intake as ordered or per policy. Utilize nutrition screening tool and intervene as necessary. Determine patient's food preferences and provide high-protein, high-caloric foods as appropriate.     INTERVENTIONS:  - Monitor oral intake, urinary output, labs, and treatment plans  - Assess nutrition and hydration status and recommend course of action  - Evaluate amount of meals eaten  - Assist patient with eating if necessary   - Allow adequate time for meals  - Recommend/ encourage appropriate diets, oral nutritional supplements, and vitamin/mineral supplements  - Order, calculate, and assess calorie counts as needed  - Recommend, monitor, and adjust tube feedings and TPN/PPN based on assessed needs  - Assess need for intravenous fluids  - Provide specific nutrition/hydration education as appropriate  - Include patient/family/caregiver in decisions related to nutrition  8/11/2024 0538 by Erin Moreland  Outcome: Progressing  8/11/2024 0536 by Erin Moreland  Outcome: Progressing     Problem: SAFETY,RESTRAINT: NV/NON-SELF DESTRUCTIVE BEHAVIOR  Goal: Remains free of harm/injury (restraint for non violent/non self-detsructive behavior)  Description:  INTERVENTIONS:  - Instruct patient/family regarding restraint use   - Assess and monitor physiologic and psychological status   - Provide interventions and comfort measures to meet assessed patient needs   - Identify and implement measures to help patient regain control  - Assess readiness for release of restraint   8/11/2024 0538 by Erin Moreland  Outcome: Progressing  8/11/2024 0536 by Erin Moreland  Outcome: Progressing  Goal: Returns to optimal restraint-free functioning  Description: INTERVENTIONS:  - Assess the patient's behavior and symptoms that indicate continued need for restraint  - Identify and implement measures to help patient regain control  - Assess readiness for release of restraint   8/11/2024 0538 by Erin Moreland  Outcome: Progressing  8/11/2024 0536 by Erin Moreland  Outcome: Progressing

## 2024-08-11 NOTE — DISCHARGE SUMMARY
[facility]  Discharge- Severiano Feliciano 1935, 88 y.o. male MRN: [id number]  Unit/Bed#: E4 -01 Encounter: [id number]  Primary Care Provider: Kirby Casanova MD   Date and time admitted to hospital: 8/8/2024 12:41 AM    * Acute on chronic respiratory failure with hypoxia and hypercapnia (HCC)  Assessment & Plan  Patient presented due to AMS. Frequent readmissions for COPD exacerbation and respiratory failure.  History of GOLD E COPD and chronic respiratory failure maintained on 3 LNC (wife reports up to 4 L at times) and nocturnal CPAP.   CT neg for PE. Right lower lobe airspace consolidation with air bronchograms, similar to slightly increased since prior study, which may be due to pneumonia, likely aspiration pneumonia given the mucous/debris in the trachea. Repeat chest CT recommended in 3 months  Received budesonide, DuoNeb qid,  and proformist BID, PRN albuterol, and iv solumedrol while inpatient   Continue CPAP at bedtime and as needed during the day  SLP recommending dysphagia 3 soft diet   Pulmonary consulted signed off cleared for discharge on steroid taper follow up outpatient   Continue on home inhaler and nebulizer regimen at discharge. Start steroid taper 40 mg x 3 day, 30 mg x 3 days, 20 mg x 3 day, 10 mg x 3 days  Resume pulmonary rehab    COPD, severe (HCC)  Assessment & Plan  History of Gold E COPD. PTA maintained on Budesonide BID and Duonebs TID with PRN albuterol in between as needed  Resume pulmonary rehab at discharge  Poor prognosis recommended palliative care by pulmonology during last office visit   Continue goals of care discussions with wife and patient's son throughout admission and at discharge. Recommended palliative consult at discharge son declined   See respiratory failure above    Low grade fever-resolved as of 8/11/2024  Assessment & Plan  Presented with low-grade fever 100.5.  WBC 11.9.  Did not meet SIRS criteria  Likely secondary to  aspiration pneumonitis   Normal PCT x2 discontinue iv antibiotics   UA unremarkable   Blood cultures negative   Afebrile since admission    Elevated troponin  Assessment & Plan  Troponin 126-->100--> 106,  likely demand ischemia from respiratory failure  EKG negative for acute ischemic changes  Received aspirin bolus in ED    Bilateral hearing loss  Assessment & Plan  Follows with ENT.  Noncompliant with use of hearing aids    Benign essential hypertension  Assessment & Plan  Losartan and torsemide    History of DVT (deep vein thrombosis)  Assessment & Plan  DVT ppx with SQ heparin while hospitalized       Medical Problems       Resolved Problems  Date Reviewed: 8/11/2024            Resolved    Acute metabolic encephalopathy 8/10/2024     Resolved by  Lizzie Greene PA-C    Low grade fever 8/11/2024     Resolved by  Lizzie Greene PA-C        Discharging Physician / Practitioner: Lizzie Greene PA-C  PCP: Kirby Casanova MD  Admission Date:   Admission Orders (From admission, onward)       Ordered        08/08/24 0527  INPATIENT ADMISSION  Once                          Discharge Date: 08/11/24    Consultations During Hospital Stay:  Pulmonology     Procedures Performed:   none    Significant Findings / Test Results:   CT head without contrast   Final Result by Cat Bonner MD (08/08 2449)      No acute intracranial abnormality.  Stable chronic microangiopathic changes within the brain.      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      Near complete opacification of the right mastoid air cells, increased since prior study.      Workstation performed: [id number]         PE Study with CT Abdomen and Pelvis with contrast   Final Result by Cat Bonner MD (08/08 6437)      No pulmonary embolism.      Right lower lobe airspace consolidation with air bronchograms, similar to slightly increased since prior study, which may be due to  pneumonia, likely aspiration pneumonia given the mucous/debris in the trachea. Recommend short-term follow-up noncontrast    chest CT in 3 months to assess for resolution after treatment.      No acute findings in the abdomen or pelvis.      Stable aneurysmal dilatation of the aortic arch. 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      The study was marked in EPIC for immediate notification.      Workstation performed: [id number]              Incidental Findings:   none    Test Results Pending at Discharge (will require follow up):   none   Outpatient Tests Requested:  Repeat chest CT in 3 months    Complications:  none    Reason for Admission: Acute on chronic respiratory failure with hypoxia and hypercapnia    Hospital Course:   Severiano Feliciano is a 88 y.o. male patient who originally presented to the hospital on 8/8/2024 due to altered mental status.  Patient was found to be in acute on chronic respiratory failure requiring up to 15 L nonrebreather and BiPAP.  VBG showed hypercapnia.  Patient has had frequent admissions for COPD exacerbations with respiratory failure in the past as patient is noncompliant with CPAP at home.  Outpatient he was offered palliative care by pulmonologist and patient/ family declined.  CT showed right lower lobe airspace consolidation concerning for aspiration versus pneumonia.  Speech therapy was consulted.  Pulmonology was consulted.  Patient was started on nebulizer treatments, IV steroids and broad-spectrum antibiotics with IV vancomycin and cefepime.  Patient's procalcitonin was negative x 2 and antibiotics were discontinued.  Speech therapy cleared patient  for dysphagia 3 diet.  Patient's mentation improved to baseline with improvement of respiratory distress.  Patient's respiratory status continued to improve a repeat home oxygen eval recommended 6 L nasal cannula previous baseline of 3 to 4 L. Patient was able to be transitioned to oral  steroid taper 40 mg x 3 days,30 mg x 2 days, 20 mg x 3 days, 10 mg x 3 days per pulmonology recommendations.  Patient will continue on his home inhaler and nebulizer regimen at discharge.  He will follow-up with pulmonology after discharge.  He will resume pulmonary rehab.  Patient is high risk for readmission frail, noncompliant with CPAP and severe COPD. Goals of care were discussed with patient, wife and son at this time interested in palliative care referral or hospice.     Please see above list of diagnoses and related plan for additional information.     Condition at Discharge: poor    Discharge Day Visit / Exam:   Subjective: Patient was seen sitting in chair at bedside today.  He reports feeling well.  He wishes to go home today.  He is oriented to person, place  Vitals: Blood Pressure: 147/89 (08/11/24 0814)  Pulse: 97 (08/11/24 0814)  Temperature: 98.2 °F (36.8 °C) (08/11/24 0814)  Temp Source: Temporal (08/11/24 0814)  Respirations: 20 (08/11/24 0814)  SpO2: 90 % (08/11/24 1125)  Exam:   Physical Exam  Vitals and nursing note reviewed.   Constitutional:       Appearance: Normal appearance. He is ill-appearing.   HENT:      Head: Normocephalic and atraumatic.   Eyes:      General: No scleral icterus.  Cardiovascular:      Rate and Rhythm: Normal rate and regular rhythm.   Pulmonary:      Effort: Pulmonary effort is normal.      Comments: Poor air movement diminished breath sounds  Abdominal:      General: Abdomen is flat. Bowel sounds are normal.      Palpations: Abdomen is soft.   Musculoskeletal:      Right lower leg: No edema.      Left lower leg: No edema.   Skin:     General: Skin is warm and dry.   Neurological:      Mental Status: He is alert. Mental status is at baseline.   Psychiatric:         Mood and Affect: Mood normal.         Behavior: Behavior normal.        Discussion with Family: Updated  (wife and son) via phone.    Discharge instructions/Information to patient and family:    See after visit summary for information provided to patient and family.      Provisions for Follow-Up Care:  See after visit summary for information related to follow-up care and any pertinent home health orders.      Mobility at time of Discharge:   Basic Mobility Inpatient Raw Score: 17  JH-HLM Goal: 5: Stand one or more mins  JH-HLM Achieved: 6: Walk 10 steps or more  HLM Goal achieved. Continue to encourage appropriate mobility.     Disposition:   Home with VNA Services (Reminder: Complete face to face encounter)    Planned Readmission: none     Discharge Statement:  I spent 60 minutes discharging the patient. This time was spent on the day of discharge. I had direct contact with the patient on the day of discharge. Greater than 50% of the total time was spent examining patient, answering all patient questions, arranging and discussing plan of care with patient as well as directly providing post-discharge instructions.  Additional time then spent on discharge activities.    Discharge Medications:  See after visit summary for reconciled discharge medications provided to patient and/or family.      **Please Note: This note may have been constructed using a voice recognition system**

## 2024-08-11 NOTE — PROGRESS NOTES
Home Oxygen Qualifying Test     Patient name: Severiano Feliciano        : 1935   Date of Test:  2024  Diagnosis:    Home Oxygen Test:    **Medicare Guidelines require item(s) 1-5 on all ambulatory patients or 1 and 2 on non-ambulatory patients.    1. Baseline SPO2 on Room Air at rest 88 %   If <= 88% on Room Air add O2 via NC to obtain SpO2 >=88%. If LPM needed, document LPM n/a needed to reach =>88%    SPO2 during exertion on Room Air 84  %  During exertion monitor SPO2. If SPO2 increases >=89%, do not add supplemental oxygen    SPO2 on Oxygen at Rest n/a % at n/a LPM    SPO2 during exertion on Oxygen 90 % at 6 LPM    Test performed during exertion activity.  Ambulation on hallway     [x]  Supplemental Home Oxygen is indicated.    []  Client does not qualify for home oxygen.      Respiratory Additional Notes- Patient ambulated on hallway on a moderate pace.  O2 gradually increased from 2L nasal cannula to 6 L/m to keep SpO2 >88%.  Patient returned to room and SpO2 increased to 91% at 6l/m after rest.    Simon Rosado, RT

## 2024-08-11 NOTE — PROGRESS NOTES
"Patient noncompliant with CPAP at HS. RT came to apply and patient only had on for about 10 minutes before removing it himself. RN attempted to reapply x 2 but efforts were ineffective as patient began to raise voice becoming visibly agitated and repeated \"tomorrow tomorrow\". RN reapplied nasal cannula which patient was agreeable to.   "

## 2024-08-11 NOTE — UTILIZATION REVIEW
Notification of Unplanned, Urgent, or   Emergency Inpatient Admission   AUTHORIZATION REQUEST   Admitting Facility [place] [address]  Tax ID: [id number]  NPI: [id number]  Place of Service: Acute [facility]  Admission Level of Care: Inpatient  Place of Service Code: 21     Attending Physician Information  Attending Name and NPI#: García Herrmann Do [[id number]]  Phone: [phone]     Admission Information  Inpatient Admission Date/Time: 8/8/24  5:27 AM  Discharge Date/Time: No discharge date for patient encounter.  Admitting Diagnosis Code/Description:  Altered mental status [R41.82]  Acute encephalopathy [G93.40]  Acute on chronic respiratory failure with hypoxia and hypercapnia (HCC) [J96.21, J96.22]  Acute hypoxic respiratory failure (HCC) [J96.01]     Utilization Review Contact  Tamika Dominguez, Utilization   Phone: [phone]  Fax: [phone]  Email: [email or web address]  Contact for approvals/pending authorizations, clinical reviews, and discharge.     Physician Advisory Services Contact  Medical Necessity Denial & Jibq-dy-Cgbk Discussion  Phone: [phone]  Fax: [phone]  Email: [email or web address]     DISCHARGE SUPPORT TEAM:  For Patients Discharge Needs & Updates  Phone: [phone] opt. 2 Fax: [phone]  Email: [email or web address]

## 2024-08-11 NOTE — ASSESSMENT & PLAN NOTE
History of Gold E COPD. PTA maintained on Budesonide BID and Duonebs TID with PRN albuterol in between as needed  Resume pulmonary rehab at discharge  Poor prognosis recommended palliative care by pulmonology during last office visit   Continue goals of care discussions with wife and patient's son throughout admission and at discharge. Recommended palliative consult at discharge son declined   See respiratory failure above

## 2024-08-11 NOTE — PLAN OF CARE
Problem: Prexisting or High Potential for Compromised Skin Integrity  Goal: Skin integrity is maintained or improved  Description: INTERVENTIONS:  - Identify patients at risk for skin breakdown  - Assess and monitor skin integrity  - Assess and monitor nutrition and hydration status  - Monitor labs   - Assess for incontinence   - Turn and reposition patient  - Assist with mobility/ambulation  - Relieve pressure over bony prominences  - Avoid friction and shearing  - Provide appropriate hygiene as needed including keeping skin clean and dry  - Evaluate need for skin moisturizer/barrier cream  - Collaborate with interdisciplinary team   - Patient/family teaching  - Consider wound care consult   Outcome: Progressing     Problem: PAIN - ADULT  Goal: Verbalizes/displays adequate comfort level or baseline comfort level  Description: Interventions:  - Encourage patient to monitor pain and request assistance  - Assess pain using appropriate pain scale  - Administer analgesics based on type and severity of pain and evaluate response  - Implement non-pharmacological measures as appropriate and evaluate response  - Consider cultural and social influences on pain and pain management  - Notify physician/advanced practitioner if interventions unsuccessful or patient reports new pain  Outcome: Progressing     Problem: INFECTION - ADULT  Goal: Absence or prevention of progression during hospitalization  Description: INTERVENTIONS:  - Assess and monitor for signs and symptoms of infection  - Monitor lab/diagnostic results  - Monitor all insertion sites, i.e. indwelling lines, tubes, and drains  - Monitor endotracheal if appropriate and nasal secretions for changes in amount and color  - [place] appropriate cooling/warming therapies per order  - Administer medications as ordered  - Instruct and encourage patient and family to use good hand hygiene technique  - Identify and instruct in appropriate isolation precautions for  identified infection/condition  Outcome: Progressing  Goal: Absence of fever/infection during neutropenic period  Description: INTERVENTIONS:  - Monitor WBC    Outcome: Progressing     Problem: SAFETY ADULT  Goal: Patient will remain free of falls  Description: INTERVENTIONS:  - Educate patient/family on patient safety including physical limitations  - Instruct patient to call for assistance with activity   - Consult OT/PT to assist with strengthening/mobility   - Keep Call bell within reach  - Keep bed low and locked with side rails adjusted as appropriate  - Keep care items and personal belongings within reach  - Initiate and maintain comfort rounds  - Make Fall Risk Sign visible to staff  - Offer Toileting every 2 Hours, in advance of need  - Initiate/Maintain bed alarm  - Obtain necessary fall risk management equipment: alarms  - Apply yellow socks and bracelet for high fall risk patients  - Consider moving patient to room near nurses station  Outcome: Progressing  Goal: Maintain or return to baseline ADL function  Description: INTERVENTIONS:  -  Assess patient's ability to carry out ADLs; assess patient's baseline for ADL function and identify physical deficits which impact ability to perform ADLs (bathing, care of mouth/teeth, toileting, grooming, dressing, etc.)  - Assess/evaluate cause of self-care deficits   - Assess range of motion  - Assess patient's mobility; develop plan if impaired  - Assess patient's need for assistive devices and provide as appropriate  - Encourage maximum independence but intervene and supervise when necessary  - Involve family in performance of ADLs  - Assess for home care needs following discharge   - Consider OT consult to assist with ADL evaluation and planning for discharge  - Provide patient education as appropriate  Outcome: Progressing  Goal: Maintains/Returns to pre admission functional level  Description: INTERVENTIONS:  - Perform AM-PAC 6 Click Basic Mobility/ Daily  Activity assessment daily.  - Set and communicate daily mobility goal to care team and patient/family/caregiver.   - Collaborate with rehabilitation services on mobility goals if consulted  - Perform Range of Motion 4 times a day.  - Reposition patient every 2 hours.  - Dangle patient 3 times a day  - Stand patient 3 times a day  - Ambulate patient 3 times a day  - Out of bed to chair 3 times a day   - Out of bed for meals 3 times a day  - Out of bed for toileting  - Record patient progress and toleration of activity level   Outcome: Progressing     Problem: DISCHARGE PLANNING  Goal: Discharge to home or other facility with appropriate resources  Description: INTERVENTIONS:  - Identify barriers to discharge w/patient and caregiver  - Arrange for needed discharge resources and transportation as appropriate  - Identify discharge learning needs (meds, wound care, etc.)  - Arrange for interpretive services to assist at discharge as needed  - Refer to Case Management Department for coordinating discharge planning if the patient needs post-hospital services based on physician/advanced practitioner order or complex needs related to functional status, cognitive ability, or social support system  Outcome: Progressing     Problem: Knowledge Deficit  Goal: Patient/family/caregiver demonstrates understanding of disease process, treatment plan, medications, and discharge instructions  Description: Complete learning assessment and assess knowledge base.  Interventions:  - Provide teaching at level of understanding  - Provide teaching via preferred learning methods  Outcome: Progressing     Problem: Nutrition/Hydration-ADULT  Goal: Nutrient/Hydration intake appropriate for improving, restoring or maintaining nutritional needs  Description: Monitor and assess patient's nutrition/hydration status for malnutrition. Collaborate with interdisciplinary team and initiate plan and interventions as ordered.  Monitor patient's weight and  dietary intake as ordered or per policy. Utilize nutrition screening tool and intervene as necessary. Determine patient's food preferences and provide high-protein, high-caloric foods as appropriate.     INTERVENTIONS:  - Monitor oral intake, urinary output, labs, and treatment plans  - Assess nutrition and hydration status and recommend course of action  - Evaluate amount of meals eaten  - Assist patient with eating if necessary   - Allow adequate time for meals  - Recommend/ encourage appropriate diets, oral nutritional supplements, and vitamin/mineral supplements  - Order, calculate, and assess calorie counts as needed  - Recommend, monitor, and adjust tube feedings and TPN/PPN based on assessed needs  - Assess need for intravenous fluids  - Provide specific nutrition/hydration education as appropriate  - Include patient/family/caregiver in decisions related to nutrition  Outcome: Progressing     Problem: SAFETY,RESTRAINT: NV/NON-SELF DESTRUCTIVE BEHAVIOR  Goal: Remains free of harm/injury (restraint for non violent/non self-detsructive behavior)  Description: INTERVENTIONS:  - Instruct patient/family regarding restraint use   - Assess and monitor physiologic and psychological status   - Provide interventions and comfort measures to meet assessed patient needs   - Identify and implement measures to help patient regain control  - Assess readiness for release of restraint   Outcome: Progressing  Goal: Returns to optimal restraint-free functioning  Description: INTERVENTIONS:  - Assess the patient's behavior and symptoms that indicate continued need for restraint  - Identify and implement measures to help patient regain control  - Assess readiness for release of restraint   Outcome: Progressing

## 2024-08-11 NOTE — ASSESSMENT & PLAN NOTE
Patient presented due to AMS. Frequent readmissions for COPD exacerbation and respiratory failure.  History of GOLD E COPD and chronic respiratory failure maintained on 3 LNC (wife reports up to 4 L at times) and nocturnal CPAP.   CT neg for PE. Right lower lobe airspace consolidation with air bronchograms, similar to slightly increased since prior study, which may be due to pneumonia, likely aspiration pneumonia given the mucous/debris in the trachea. Repeat chest CT recommended in 3 months  Received budesonide, DuoNeb qid,  and proformist BID, PRN albuterol, and iv solumedrol while inpatient   Continue CPAP at bedtime and as needed during the day  SLP recommending dysphagia 3 soft diet   Pulmonary consulted signed off cleared for discharge on steroid taper follow up outpatient   Continue on home inhaler and nebulizer regimen at discharge. Start steroid taper 40 mg x 3 day, 30 mg x 3 days, 20 mg x 3 day, 10 mg x 3 days  Resume pulmonary rehab

## 2024-08-12 ENCOUNTER — PATIENT OUTREACH (OUTPATIENT)
Dept: CASE MANAGEMENT | Facility: HOSPITAL | Age: 89
End: 2024-08-12

## 2024-08-12 ENCOUNTER — TELEPHONE (OUTPATIENT)
Dept: FAMILY MEDICINE CLINIC | Facility: CLINIC | Age: 89
End: 2024-08-12

## 2024-08-12 ENCOUNTER — TRANSITIONAL CARE MANAGEMENT (OUTPATIENT)
Dept: FAMILY MEDICINE CLINIC | Facility: CLINIC | Age: 89
End: 2024-08-12

## 2024-08-12 DIAGNOSIS — Z71.89 COMPLEX CARE COORDINATION: Primary | ICD-10-CM

## 2024-08-12 NOTE — UTILIZATION REVIEW
Notification of Unplanned, Urgent, or   Emergency Inpatient Admission   AUTHORIZATION REQUEST   [place]mitting [place] [address]  Tax ID: [id number]  NPI: [id number]  Place of Service: Acute Care Hospital  Admission Level of Care: Inpatient  Place of Service Code: 21     Attending Physician Information  Attending Name and NPI#: García Herrmann Do [[id number]]  Phone: [phone]     Admission Information  Inpatient Admission Date/Time: 8/8/24  5:27 AM  Discharge Date/Time: 8/11/2024  2:59 PM  Admitting Diagnosis Code/Description:  Altered mental status [R41.82]  Acute encephalopathy [G93.40]  Acute on chronic respiratory failure with hypoxia and hypercapnia (HCC) [J96.21, J96.22]  Acute hypoxic respiratory failure (HCC) [J96.01]     Utilization Review Contact  Tamika Dominguez, Utilization   Phone: [phone]  Fax: [phone]  Email: [email or web address]  Contact for approvals/pending authorizations, clinical reviews, and discharge.     Physician Advisory Services Contact  Medical Necessity Denial & Dzgv-ys-Wqkw Discussion  Phone: [phone]  Fax: [phone]  Email: [email or web address]     DISCHARGE SUPPORT TEAM:  For Patients Discharge Needs & Updates  Phone: [phone] opt. 2 Fax: [phone]  Email: [email or web address]

## 2024-08-12 NOTE — UTILIZATION REVIEW
NOTIFICATION OF ADMISSION DISCHARGE   This is a Notification of Discharge from [facility]. Please be advised that this patient has been discharge from our facility. Below you will find the admission and discharge date and time including the patient’s disposition.   UTILIZATION REVIEW CONTACT:  Tamika Dominguez  Utilization   Network Utilization Review Department  Phone: [phone] x carefully listen to the prompts. All voicemails are confidential.  Email: [email or web address]     ADMISSION INFORMATION  PRESENTATION DATE: 8/8/2024 12:41 AM  OBERVATION ADMISSION DATE: N/A  INPATIENT ADMISSION DATE: 8/8/24  5:27 AM   DISCHARGE DATE: 8/11/2024  2:59 PM   DISPOSITION:Home with Home Health Care    Network Utilization Review Department  ATTENTION: Please call with any questions or concerns to [phone] and carefully listen to the prompts so that you are directed to the right person. All voicemails are confidential.   For Discharge needs, contact Care Management DC Support Team at [phone] opt. 2  Send all requests for admission clinical reviews, approved or denied determinations and any other requests to dedicated fax number below belonging to the campus where the patient is receiving treatment. List of dedicated fax numbers for the Facilities:  FACILITY NAME UR FAX NUMBER   ADMISSION DENIALS (Administrative/Medical Necessity) [phone]   DISCHARGE SUPPORT TEAM ([facility]) [phone]   PARENT CHILD HEALTH (Maternity/NICU/Pediatrics) [phone]   [facility] [phone]   [facility] [phone]   [facility] [phone]   [facility] [phone]   [facility] [phone]   [facility] [phone]   [facility] [phone]   [facility]  [phone]   [facility] [phone]   [facility] [phone]   [facility] [phone]   [facility] [phone]

## 2024-08-12 NOTE — PROGRESS NOTES
HRR referral received via IB. Chart reviewed. Patient admitted to IP at \[facility]"" 8/8-8/11 with AMS. Patient was found to be in acute on chronic respiratory failure requiring up to 15 L nonrebreather and BiPAP. VBG showed hypercapnia. Patient has had frequent admissions for COPD exacerbations with respiratory failure in the past as patient is noncompliant with CPAP at home.     This RNCM called patient and spoke with his wife Viridiana. She states patient is sleeping. She says he is doing really good and his breathing is much better. She says he is taking his breathing machine medicines. She says they have his medications and they are delivered to the home. She manages them but declined to review. She says she is going to call patients PCP office to make BILL apt today. I offered to help and she declined. She says their son Daljit helps them out and I should speak with him. I then called patients son Daljit and introduced self and explained the role of the RNCM and CCM services. He states that his father is doing really good and they have everything he needs. He confirms that he has all of his medications and takes them as directed. He says that his medications are delivered to the house and he drives his parents to all of their doctors apt's. He declined the need for CCM services or follow up. He says they know how to deal with patients COPD.

## 2024-08-13 ENCOUNTER — OFFICE VISIT (OUTPATIENT)
Dept: FAMILY MEDICINE CLINIC | Facility: CLINIC | Age: 89
End: 2024-08-13
Payer: MEDICARE

## 2024-08-13 VITALS
OXYGEN SATURATION: 96 % | HEIGHT: 63 IN | HEART RATE: 81 BPM | RESPIRATION RATE: 20 BRPM | WEIGHT: 128 LBS | BODY MASS INDEX: 22.68 KG/M2 | SYSTOLIC BLOOD PRESSURE: 138 MMHG | DIASTOLIC BLOOD PRESSURE: 70 MMHG | TEMPERATURE: 97.9 F

## 2024-08-13 DIAGNOSIS — H90.3 SENSORINEURAL HEARING LOSS (SNHL) OF BOTH EARS: ICD-10-CM

## 2024-08-13 DIAGNOSIS — I10 BENIGN ESSENTIAL HYPERTENSION: ICD-10-CM

## 2024-08-13 DIAGNOSIS — J96.12 CHRONIC RESPIRATORY FAILURE WITH HYPOXIA AND HYPERCAPNIA (HCC): Primary | ICD-10-CM

## 2024-08-13 DIAGNOSIS — R13.19 OTHER DYSPHAGIA: ICD-10-CM

## 2024-08-13 DIAGNOSIS — J96.11 CHRONIC RESPIRATORY FAILURE WITH HYPOXIA AND HYPERCAPNIA (HCC): Primary | ICD-10-CM

## 2024-08-13 DIAGNOSIS — J44.9 COPD, SEVERE (HCC): ICD-10-CM

## 2024-08-13 PROBLEM — J96.21 ACUTE ON CHRONIC RESPIRATORY FAILURE WITH HYPOXIA AND HYPERCAPNIA (HCC): Status: ACTIVE | Noted: 2018-04-02

## 2024-08-13 PROBLEM — J96.22 ACUTE ON CHRONIC RESPIRATORY FAILURE WITH HYPOXIA AND HYPERCAPNIA (HCC): Status: ACTIVE | Noted: 2018-04-02

## 2024-08-13 PROBLEM — I50.32 CHRONIC DIASTOLIC (CONGESTIVE) HEART FAILURE (HCC): Status: ACTIVE | Noted: 2019-01-19

## 2024-08-13 LAB
BACTERIA BLD CULT: NORMAL
BACTERIA BLD CULT: NORMAL

## 2024-08-13 PROCEDURE — 99496 TRANSJ CARE MGMT HIGH F2F 7D: CPT

## 2024-08-13 NOTE — PROGRESS NOTES
Transition of Care Visit  Name: Severiano Feliciano      : 1935      MRN: [id number]  Encounter Provider: Cesario Montalvo MD  Encounter Date: 2024   Encounter department: [facility] CARE [facility]    Assessment & Plan   1. Chronic respiratory failure with hypoxia and hypercapnia (HCC)  Assessment & Plan:  Acute respiratory failure now resolved and pt back to requiring 3-4L of O2 NC. Have reviewed recommendation of 6L O2 therapy after recent hospitalization and family will consider increase. I have also reviewed need for appropriate Pulmonary follow up as well as stressed the importance of continuing pulmonary rehab. Recommend continuing Albuterol PRN, budesonide 0.5mg BID, formeterol 20mcg BID, and duoneb 0.5-2.5mg BID. Also recommend finishing steroid taper, instructions reviewed and family expressed understanding. Per CT recommendation repeat chest CT in 3 months evaluate resolution.    Of note significant counseling performed in regard to palliative referral and its use in providing comfort to pt. I stressed that it is not Hospice and treatment can be continued while in palliative.  2. COPD, severe (HCC)  Assessment & Plan:  Plan per chronic respiratory failure with hypoxia and hypercapnia.  3. Benign essential hypertension  Assessment & Plan:  BP Readings from Last 3 Encounters:   24 138/70   24 147/89   07/10/24 120/70      Continue losartan 100mg daily and torsemide 5mg daily  4. Other dysphagia  Assessment & Plan:  Reviewed speech therapy evaluation with family and need to maintain soft diet to prevent aspiration and further complications. Advised to keep pt's head/thorac elevated and avoid feeding while pt supine.  5. Sensorineural hearing loss (SNHL) of both ears  Assessment & Plan:  Continue ENT follow up. Hx of noncompliance with hearing aids.         History of Present Illness     Transitional Care Management Review:   Severiano Feliciano is a 88 y.o.  male here for TCM follow up.     During the TCM phone call patient stated:  TCM Call       Date and time call was made  8/12/2024  4:24 PM    Hospital care reviewed  Records reviewed    Patient was hospitialized at  [facility]    Date of Admission  08/08/24    Date of discharge  08/11/24    Diagnosis  Chronic obstructive pulmonary disease with acute exacerbation    Disposition  Home    Were the patients medications reviewed and updated  Yes    Current Symptoms  None          TCM Call       Post hospital issues  None    Should patient be enrolled in anticoag monitoring?  No    Scheduled for follow up?  Yes    Patients specialists  [facility]    Did you obtain your prescribed medications  Yes    Do you need help managing your prescriptions or medications  No    Is transportation to your appointment needed  No    I have advised the patient to call PCP with any new or worsening symptoms  Oanh Roberts MA    Living Arrangements  Family members    Have you fallen in the last 12 months  No    Interperter language line needed  No          Severiano Feliciano is a 88 y.o. male patient who originally presented to the hospital on 8/8/2024 due to altered mental status.  Patient was found to be in acute on chronic respiratory failure requiring up to 15 L nonrebreather and BiPAP.  VBG showed hypercapnia.  Patient has had frequent admissions for COPD exacerbations with respiratory failure in the past as patient is noncompliant with CPAP at home.  Outpatient he was offered palliative care by pulmonologist and patient/ family declined.  CT showed right lower lobe airspace consolidation concerning for aspiration versus pneumonia.  Speech therapy was consulted.  Pulmonology was consulted.  Patient was started on nebulizer treatments, IV steroids and broad-spectrum antibiotics with IV vancomycin and cefepime.  Patient's procalcitonin was negative x 2 and antibiotics were discontinued.  Speech therapy cleared patient  for  "dysphagia 3 diet.  Patient's mentation improved to baseline with improvement of respiratory distress.  Patient's respiratory status continued to improve a repeat home oxygen eval recommended 6 L nasal cannula previous baseline of 3 to 4 L. Patient was able to be transitioned to oral steroid taper 40 mg x 3 days,30 mg x 2 days, 20 mg x 3 days, 10 mg x 3 days per pulmonology recommendations.  Patient will continue on his home inhaler and nebulizer regimen at discharge.  He will follow-up with pulmonology after discharge.  He will resume pulmonary rehab.  Patient is high risk for readmission frail, noncompliant with CPAP and severe COPD. Goals of care were discussed with patient, wife and son at this time interested in palliative care referral or hospice.     Today pt presents to clinic with his wife and son. Family is endorsing no concerns and good medication compliance. Pt now using CPAP nightly as directed and mentation back to baseline. Pt continues to use 3-4L of O2 with saturations of at least 94%. Family says pt's appetite is also much improved and using commode at baseline.       Review of Systems   Constitutional:  Negative for chills and fatigue.   Respiratory:  Positive for shortness of breath (baseline). Negative for cough and wheezing.    Cardiovascular:  Negative for chest pain.   Gastrointestinal:  Negative for abdominal pain, blood in stool, constipation, diarrhea, nausea and vomiting.   Genitourinary:  Positive for frequency (chronic, using torsemide). Negative for dysuria and hematuria.   Neurological:  Negative for dizziness, weakness, numbness and headaches.     Objective     /70 (BP Location: Right arm, Patient Position: Sitting, Cuff Size: Adult)   Pulse 81   Temp 97.9 °F (36.6 °C) (Temporal)   Resp 20   Ht 5' 3\" (1.6 m)   Wt 58.1 kg (128 lb)   SpO2 96%   BMI 22.67 kg/m²     Physical Exam  Vitals and nursing note reviewed.   Constitutional:       General: He is not in acute " distress.     Appearance: He is not ill-appearing, toxic-appearing or diaphoretic.      Comments: Using 4L of O2 during evaluation. Sitting comfortably in examination chair. Able to ambulate without much assistance.   HENT:      Head: Normocephalic.   Eyes:      Conjunctiva/sclera: Conjunctivae normal.   Cardiovascular:      Rate and Rhythm: Normal rate and regular rhythm.      Pulses: Normal pulses.      Heart sounds: Normal heart sounds.   Pulmonary:      Effort: Pulmonary effort is normal. No accessory muscle usage or respiratory distress.      Breath sounds: Normal breath sounds and air entry. No stridor. No decreased breath sounds, wheezing, rhonchi or rales.   Musculoskeletal:      Cervical back: Neck supple.      Right lower leg: No edema.      Left lower leg: No edema.   Skin:     General: Skin is warm and dry.      Capillary Refill: Capillary refill takes less than 2 seconds.   Neurological:      General: No focal deficit present.      Mental Status: He is alert.   Psychiatric:         Mood and Affect: Mood normal.         Behavior: Behavior normal.       Medications have been reviewed by provider in current encounter    Administrative Statements

## 2024-08-14 NOTE — ASSESSMENT & PLAN NOTE
Reviewed speech therapy evaluation with family and need to maintain soft diet to prevent aspiration and further complications. Advised to keep pt's head/thorac elevated and avoid feeding while pt supine.

## 2024-08-14 NOTE — ASSESSMENT & PLAN NOTE
Acute respiratory failure now resolved and pt back to requiring 3-4L of O2 NC. Have reviewed recommendation of 6L O2 therapy after recent hospitalization and family will consider increase. I have also reviewed need for appropriate Pulmonary follow up as well as stressed the importance of continuing pulmonary rehab. Recommend continuing Albuterol PRN, budesonide 0.5mg BID, formeterol 20mcg BID, and duoneb 0.5-2.5mg BID. Also recommend finishing steroid taper, instructions reviewed and family expressed understanding. Per CT recommendation repeat chest CT in 3 months evaluate resolution.    Of note significant counseling performed in regard to palliative referral and its use in providing comfort to pt. I stressed that it is not Hospice and treatment can be continued while in palliative.

## 2024-08-14 NOTE — ASSESSMENT & PLAN NOTE
BP Readings from Last 3 Encounters:   08/13/24 138/70   08/11/24 147/89   07/10/24 120/70      Continue losartan 100mg daily and torsemide 5mg daily

## 2024-08-18 DIAGNOSIS — J96.11 CHRONIC RESPIRATORY FAILURE WITH HYPOXIA AND HYPERCAPNIA (HCC): ICD-10-CM

## 2024-08-18 DIAGNOSIS — J96.12 CHRONIC RESPIRATORY FAILURE WITH HYPOXIA AND HYPERCAPNIA (HCC): ICD-10-CM

## 2024-08-18 RX ORDER — ALBUTEROL SULFATE 90 UG/1
AEROSOL, METERED RESPIRATORY (INHALATION)
Qty: 18 G | Refills: 1 | Status: SHIPPED | OUTPATIENT
Start: 2024-08-18

## 2024-08-29 NOTE — PROGRESS NOTES
Pulmonary Rehabilitation Plan of Care   Care Plan           Today's date: 2020   Total visits to date: initial  Patient name: Rosette Mario      : [id number]  Age: 82 y o  MRN: [id number]  Referring Physician: Cholo Carter MD  Provider: Tristan Cuevas  Clinician: Vega An RRT    Dx:   Encounter Diagnoses   Name Primary?  Chronic obstructive pulmonary disease, unspecified COPD type ([facility] )     Chronic respiratory failure with hypoxia [facility])      Date of onset: 2020    COMMENTS:  Mr Giorgio Manning presented today with his wife accompanying him to pulmonary rehab for evaluation and 6 min walk test  According to his wife he has had COPD for a long time  He was recently hospitalized for blood pressure problems  He needs rehabilitation to get his strength back  Patient does not speak English so wife speaks for him  She states he exercises at home with weights  His goal is to improve his health and shortness of breath  He is not conversational so not much additional information was provided  He will have a pulmonary function test done before he starts his exercise with rehab  He completed a six minute walk on 2L using his own concentrator  He walked continuously only taking a few short pauses  He accomplished 810 feet  He did desaturate into the 80s several times, lowest desat to 85%, however he felt his shortness of breath was minimal 2/10 and exertion was easy 3/10  Patient was not able to complete all questionnaires due to difficulty reading the language  [place]ian version of PHQ9, GAD7 and CAT were provided and are attached  Note score of 5 on PHQ9 and 7 on GAD7      Medication compliance: Yes   Comments: compliant  Fall Risk: Low   Comments: steady gait    EXERCISE/ACTIVITY    Cardiopulmonary Goals:   Min: 40-50 minutes aerobic exercise per [place] session   HR: 20-30 bpm greater than resting HR   RPE: 4-6  (moderate to moderately hard exercise)   O2 sat: >90%    Modalities: Treadmill, UBE, NuStep and Recumbent bike  Strength trainin-3 days / week, 12-15 repitations  and 1-2 sets per modality    Modalities: Leg press, Chest press, Lateral pull down , Lateral raise, Overhead press, Arm curl and Seated Row    Exercise Progression: continued exercise progression      RPE 3-5  Home activity: currently with weights  Goals: 10% improvement in functional capacity, home exercise days opposite OK and >150 mins of exercise/wk  Education: Benefit of exercise, home exercise, pursed lip breathing, RPE scale and O2 saturation monitoring   Plan:home exercise target 30 mins, 2 days opposite OK, exercise education video and Improved 6MW results  Readiness to change: Action:  (Changing behavior)    NUTRITION    Weight control:    Starting weight: 145        Current Weight:     Diabetes: N/A  Goals:decreased body fat% and Improved Rate Your Plate score  Education:More frequent meals, smaller portions  hydration  portion control    Label Reading  Plan: Education Video- Diet and Nutrition and Education Class: Healthy Eating  Readiness to change: Preparation:  (Getting ready to change)     PSYCHOSOCIAL    Emotional:  5-9 = Mild Depression  Social support: Very Good  Goals: PHQ-9 - reduced severity by one level, Increased interest in doing things and increased energy  Education: signs/sxs of depression  benefits of positive support system  coping mechanisms  depression and chronic disease  Plan: Education Class: Stress and Your Lungs, Relaxation and Mindfulness and Anxiety and Lung Disease  Readiness to change: Preparation:  (Getting ready to change)     OTHER CORE COMPONENTS     Tobacco:   Social History     Tobacco Use   Smoking Status Former Smoker    Packs/day: 1 00    Years: 35 00    Pack years: 35 00    Start date:     Last attempt to quit:     Years since quittin 5   Smokeless Tobacco Never Used     Oxygen: 2L 91% at rest, desat to 85% with 6MWT  Blood pressure:    Restin/80   Exercise: 176/92  Goals: consistent BP < 130/80, reduced dietary sodium <2300mg, consistent exercise >150 mins/wk and medication compliance  Education: Pulmonary Disease, physiology, Exercise, strength, flexibility, Conservation of energy, oxygen, breathing techniques, Mental Health, Diet,nutrition, Medication, Avoiding Infections and Caregivers  Plan: Causes of lung disease, Prevention and treatment, Exercise benefits and Proper nutrition  Readiness to change: Contemplation:  (Acknowledging that there is a problem but not yet ready or sure of wanting to make a change) No

## 2024-09-03 ENCOUNTER — APPOINTMENT (EMERGENCY)
Dept: RADIOLOGY | Facility: HOSPITAL | Age: 89
DRG: 193 | End: 2024-09-03
Payer: MEDICARE

## 2024-09-03 ENCOUNTER — HOSPITAL ENCOUNTER (INPATIENT)
Facility: HOSPITAL | Age: 89
LOS: 3 days | Discharge: HOME HEALTH | DRG: 193 | End: 2024-09-07
Admitting: INTERNAL MEDICINE
Payer: MEDICARE

## 2024-09-03 DIAGNOSIS — J96.01 ACUTE HYPOXIC RESPIRATORY FAILURE (HCC): ICD-10-CM

## 2024-09-03 DIAGNOSIS — J44.1 COPD WITH ACUTE EXACERBATION (HCC): Primary | ICD-10-CM

## 2024-09-03 LAB
2HR DELTA HS TROPONIN: 0 NG/L
ALBUMIN SERPL BCG-MCNC: 3.7 G/DL (ref 3.5–5)
ALP SERPL-CCNC: 66 U/L (ref 34–104)
ALT SERPL W P-5'-P-CCNC: 24 U/L (ref 7–52)
APTT PPP: 23 SECONDS (ref 23–34)
AST SERPL W P-5'-P-CCNC: 30 U/L (ref 13–39)
ATRIAL RATE: 104 BPM
BASE EX.OXY STD BLDV CALC-SCNC: 80.4 % (ref 60–80)
BASE EX.OXY STD BLDV CALC-SCNC: 82.6 % (ref 60–80)
BASE EXCESS BLDV CALC-SCNC: 12.5 MMOL/L
BASE EXCESS BLDV CALC-SCNC: 14.5 MMOL/L
BASOPHILS # BLD AUTO: 0.02 THOUSANDS/ÂΜL (ref 0–0.1)
BASOPHILS NFR BLD AUTO: 0 % (ref 0–1)
BILIRUB SERPL-MCNC: 0.41 MG/DL (ref 0.2–1)
BILIRUB UR QL STRIP: NEGATIVE
BNP SERPL-MCNC: 36 PG/ML (ref 0–100)
BUN SERPL-MCNC: 24 MG/DL (ref 5–25)
CALCIUM SERPL-MCNC: 9.3 MG/DL (ref 8.4–10.2)
CARDIAC TROPONIN I PNL SERPL HS: 15 NG/L
CARDIAC TROPONIN I PNL SERPL HS: 15 NG/L
CHLORIDE SERPL-SCNC: 98 MMOL/L (ref 96–108)
CLARITY UR: CLEAR
CO2 SERPL-SCNC: >45 MMOL/L (ref 21–32)
COLOR UR: YELLOW
CREAT SERPL-MCNC: 0.82 MG/DL (ref 0.6–1.3)
EOSINOPHIL # BLD AUTO: 0.36 THOUSAND/ÂΜL (ref 0–0.61)
EOSINOPHIL NFR BLD AUTO: 4 % (ref 0–6)
ERYTHROCYTE [DISTWIDTH] IN BLOOD BY AUTOMATED COUNT: 13.2 % (ref 11.6–15.1)
FLUAV RNA RESP QL NAA+PROBE: NEGATIVE
FLUBV RNA RESP QL NAA+PROBE: NEGATIVE
GFR SERPL CREATININE-BSD FRML MDRD: 79 ML/MIN/1.73SQ M
GLUCOSE SERPL-MCNC: 168 MG/DL (ref 65–140)
GLUCOSE UR STRIP-MCNC: NEGATIVE MG/DL
HCO3 BLDV-SCNC: 40.9 MMOL/L (ref 24–30)
HCO3 BLDV-SCNC: 44.6 MMOL/L (ref 24–30)
HCT VFR BLD AUTO: 39 % (ref 36.5–49.3)
HGB BLD-MCNC: 11 G/DL (ref 12–17)
HGB UR QL STRIP.AUTO: NEGATIVE
IMM GRANULOCYTES # BLD AUTO: 0.04 THOUSAND/UL (ref 0–0.2)
IMM GRANULOCYTES NFR BLD AUTO: 0 % (ref 0–2)
INR PPP: 1.03 (ref 0.85–1.19)
KETONES UR STRIP-MCNC: NEGATIVE MG/DL
LACTATE SERPL-SCNC: 1.3 MMOL/L (ref 0.5–2)
LEUKOCYTE ESTERASE UR QL STRIP: NEGATIVE
LYMPHOCYTES # BLD AUTO: 0.96 THOUSANDS/ÂΜL (ref 0.6–4.47)
LYMPHOCYTES NFR BLD AUTO: 10 % (ref 14–44)
MCH RBC QN AUTO: 31.7 PG (ref 26.8–34.3)
MCHC RBC AUTO-ENTMCNC: 28.2 G/DL (ref 31.4–37.4)
MCV RBC AUTO: 112 FL (ref 82–98)
MONOCYTES # BLD AUTO: 1.31 THOUSAND/ÂΜL (ref 0.17–1.22)
MONOCYTES NFR BLD AUTO: 14 % (ref 4–12)
NEUTROPHILS # BLD AUTO: 6.98 THOUSANDS/ÂΜL (ref 1.85–7.62)
NEUTS SEG NFR BLD AUTO: 72 % (ref 43–75)
NITRITE UR QL STRIP: NEGATIVE
NRBC BLD AUTO-RTO: 0 /100 WBCS
O2 CT BLDV-SCNC: 13.4 ML/DL
O2 CT BLDV-SCNC: 14.4 ML/DL
P AXIS: 81 DEGREES
PCO2 BLDV: 73.7 MM HG (ref 42–50)
PCO2 BLDV: 92.8 MM HG (ref 42–50)
PH BLDV: 7.3 [PH] (ref 7.3–7.4)
PH BLDV: 7.36 [PH] (ref 7.3–7.4)
PH UR STRIP.AUTO: 6 [PH]
PLATELET # BLD AUTO: 125 THOUSANDS/UL (ref 149–390)
PMV BLD AUTO: 10.1 FL (ref 8.9–12.7)
PO2 BLDV: 46.9 MM HG (ref 35–45)
PO2 BLDV: 49.8 MM HG (ref 35–45)
POTASSIUM SERPL-SCNC: 4.4 MMOL/L (ref 3.5–5.3)
PR INTERVAL: 142 MS
PROCALCITONIN SERPL-MCNC: 0.08 NG/ML
PROT SERPL-MCNC: 6.8 G/DL (ref 6.4–8.4)
PROT UR STRIP-MCNC: ABNORMAL MG/DL
PROTHROMBIN TIME: 13.7 SECONDS (ref 12.3–15)
QRS AXIS: 82 DEGREES
QRSD INTERVAL: 80 MS
QT INTERVAL: 330 MS
QTC INTERVAL: 433 MS
RBC # BLD AUTO: 3.47 MILLION/UL (ref 3.88–5.62)
RSV RNA RESP QL NAA+PROBE: NEGATIVE
SARS-COV-2 RNA RESP QL NAA+PROBE: NEGATIVE
SODIUM SERPL-SCNC: 145 MMOL/L (ref 135–147)
SP GR UR STRIP.AUTO: 1.03 (ref 1–1.03)
T WAVE AXIS: 84 DEGREES
UROBILINOGEN UR STRIP-ACNC: <2 MG/DL
VENTRICULAR RATE: 104 BPM
WBC # BLD AUTO: 9.67 THOUSAND/UL (ref 4.31–10.16)

## 2024-09-03 PROCEDURE — 94760 N-INVAS EAR/PLS OXIMETRY 1: CPT

## 2024-09-03 PROCEDURE — 96368 THER/DIAG CONCURRENT INF: CPT

## 2024-09-03 PROCEDURE — 96367 TX/PROPH/DG ADDL SEQ IV INF: CPT

## 2024-09-03 PROCEDURE — 0241U HB NFCT DS VIR RESP RNA 4 TRGT: CPT

## 2024-09-03 PROCEDURE — 99285 EMERGENCY DEPT VISIT HI MDM: CPT

## 2024-09-03 PROCEDURE — 84145 PROCALCITONIN (PCT): CPT

## 2024-09-03 PROCEDURE — 83880 ASSAY OF NATRIURETIC PEPTIDE: CPT

## 2024-09-03 PROCEDURE — 81001 URINALYSIS AUTO W/SCOPE: CPT

## 2024-09-03 PROCEDURE — 83605 ASSAY OF LACTIC ACID: CPT

## 2024-09-03 PROCEDURE — 80053 COMPREHEN METABOLIC PANEL: CPT

## 2024-09-03 PROCEDURE — 93010 ELECTROCARDIOGRAM REPORT: CPT | Performed by: STUDENT IN AN ORGANIZED HEALTH CARE EDUCATION/TRAINING PROGRAM

## 2024-09-03 PROCEDURE — 94664 DEMO&/EVAL PT USE INHALER: CPT

## 2024-09-03 PROCEDURE — 96375 TX/PRO/DX INJ NEW DRUG ADDON: CPT

## 2024-09-03 PROCEDURE — 85730 THROMBOPLASTIN TIME PARTIAL: CPT

## 2024-09-03 PROCEDURE — 94640 AIRWAY INHALATION TREATMENT: CPT

## 2024-09-03 PROCEDURE — 93005 ELECTROCARDIOGRAM TRACING: CPT

## 2024-09-03 PROCEDURE — 85610 PROTHROMBIN TIME: CPT

## 2024-09-03 PROCEDURE — 71045 X-RAY EXAM CHEST 1 VIEW: CPT

## 2024-09-03 PROCEDURE — 85025 COMPLETE CBC W/AUTO DIFF WBC: CPT

## 2024-09-03 PROCEDURE — 96365 THER/PROPH/DIAG IV INF INIT: CPT

## 2024-09-03 PROCEDURE — 94644 CONT INHLJ TX 1ST HOUR: CPT

## 2024-09-03 PROCEDURE — 87449 NOS EACH ORGANISM AG IA: CPT

## 2024-09-03 PROCEDURE — 84484 ASSAY OF TROPONIN QUANT: CPT

## 2024-09-03 PROCEDURE — 94002 VENT MGMT INPAT INIT DAY: CPT

## 2024-09-03 PROCEDURE — 82805 BLOOD GASES W/O2 SATURATION: CPT

## 2024-09-03 PROCEDURE — 99291 CRITICAL CARE FIRST HOUR: CPT

## 2024-09-03 PROCEDURE — 36415 COLL VENOUS BLD VENIPUNCTURE: CPT

## 2024-09-03 PROCEDURE — 87040 BLOOD CULTURE FOR BACTERIA: CPT

## 2024-09-03 RX ORDER — ALBUTEROL SULFATE 5 MG/ML
10 SOLUTION RESPIRATORY (INHALATION) ONCE
Status: COMPLETED | OUTPATIENT
Start: 2024-09-03 | End: 2024-09-03

## 2024-09-03 RX ORDER — MAGNESIUM SULFATE HEPTAHYDRATE 40 MG/ML
2 INJECTION, SOLUTION INTRAVENOUS ONCE
Status: COMPLETED | OUTPATIENT
Start: 2024-09-03 | End: 2024-09-03

## 2024-09-03 RX ORDER — SODIUM CHLORIDE FOR INHALATION 0.9 %
12 VIAL, NEBULIZER (ML) INHALATION ONCE
Status: COMPLETED | OUTPATIENT
Start: 2024-09-03 | End: 2024-09-03

## 2024-09-03 RX ORDER — ALBUTEROL SULFATE 2.5 MG/3ML
1 SOLUTION RESPIRATORY (INHALATION) ONCE
Status: COMPLETED | OUTPATIENT
Start: 2024-09-03 | End: 2024-09-03

## 2024-09-03 RX ORDER — METHYLPREDNISOLONE SODIUM SUCCINATE 125 MG/2ML
125 INJECTION, POWDER, LYOPHILIZED, FOR SOLUTION INTRAMUSCULAR; INTRAVENOUS ONCE
Status: COMPLETED | OUTPATIENT
Start: 2024-09-03 | End: 2024-09-03

## 2024-09-03 RX ORDER — ACETAMINOPHEN 10 MG/ML
1000 INJECTION, SOLUTION INTRAVENOUS ONCE
Status: COMPLETED | OUTPATIENT
Start: 2024-09-03 | End: 2024-09-03

## 2024-09-03 RX ADMIN — ACETAMINOPHEN 1000 MG: 10 INJECTION INTRAVENOUS at 21:16

## 2024-09-03 RX ADMIN — DOXYCYCLINE 100 MG: 100 INJECTION, POWDER, LYOPHILIZED, FOR SOLUTION INTRAVENOUS at 22:13

## 2024-09-03 RX ADMIN — ISODIUM CHLORIDE 12 ML: 0.03 SOLUTION RESPIRATORY (INHALATION) at 21:16

## 2024-09-03 RX ADMIN — ALBUTEROL SULFATE 10 MG: 2.5 SOLUTION RESPIRATORY (INHALATION) at 21:16

## 2024-09-03 RX ADMIN — MAGNESIUM SULFATE HEPTAHYDRATE 2 G: 40 INJECTION, SOLUTION INTRAVENOUS at 21:19

## 2024-09-03 RX ADMIN — ISODIUM CHLORIDE 12 ML: 0.03 SOLUTION RESPIRATORY (INHALATION) at 23:10

## 2024-09-03 RX ADMIN — METHYLPREDNISOLONE SODIUM SUCCINATE 125 MG: 125 INJECTION, POWDER, FOR SOLUTION INTRAMUSCULAR; INTRAVENOUS at 21:15

## 2024-09-03 RX ADMIN — CEFEPIME 2000 MG: 2 INJECTION, POWDER, FOR SOLUTION INTRAVENOUS at 21:26

## 2024-09-03 RX ADMIN — IPRATROPIUM BROMIDE 1 MG: 0.5 SOLUTION RESPIRATORY (INHALATION) at 23:09

## 2024-09-03 RX ADMIN — IPRATROPIUM BROMIDE 1 MG: 0.5 SOLUTION RESPIRATORY (INHALATION) at 21:16

## 2024-09-03 RX ADMIN — ALBUTEROL SULFATE 10 MG: 2.5 SOLUTION RESPIRATORY (INHALATION) at 23:09

## 2024-09-04 PROBLEM — R65.10 SIRS (SYSTEMIC INFLAMMATORY RESPONSE SYNDROME) (HCC): Status: ACTIVE | Noted: 2024-09-04

## 2024-09-04 PROBLEM — J44.1 COPD WITH ACUTE EXACERBATION (HCC): Status: ACTIVE | Noted: 2018-02-01

## 2024-09-04 LAB
ANION GAP SERPL CALCULATED.3IONS-SCNC: 3 MMOL/L (ref 4–13)
ATRIAL RATE: 89 BPM
BACTERIA UR QL AUTO: ABNORMAL /HPF
BASE EX.OXY STD BLDV CALC-SCNC: 76.1 % (ref 60–80)
BASE EX.OXY STD BLDV CALC-SCNC: 86.7 % (ref 60–80)
BASE EX.OXY STD BLDV CALC-SCNC: 94.2 % (ref 60–80)
BASE EX.OXY STD BLDV CALC-SCNC: 96.3 % (ref 60–80)
BASE EXCESS BLDV CALC-SCNC: 11.2 MMOL/L
BASE EXCESS BLDV CALC-SCNC: 11.7 MMOL/L
BASE EXCESS BLDV CALC-SCNC: 13.7 MMOL/L
BASE EXCESS BLDV CALC-SCNC: 9.3 MMOL/L
BASOPHILS # BLD MANUAL: 0 THOUSAND/UL (ref 0–0.1)
BASOPHILS NFR MAR MANUAL: 0 % (ref 0–1)
BUN SERPL-MCNC: 28 MG/DL (ref 5–25)
CALCIUM SERPL-MCNC: 9.4 MG/DL (ref 8.4–10.2)
CHLORIDE SERPL-SCNC: 96 MMOL/L (ref 96–108)
CO2 SERPL-SCNC: 44 MMOL/L (ref 21–32)
CREAT SERPL-MCNC: 0.91 MG/DL (ref 0.6–1.3)
EOSINOPHIL # BLD MANUAL: 0 THOUSAND/UL (ref 0–0.4)
EOSINOPHIL NFR BLD MANUAL: 0 % (ref 0–6)
ERYTHROCYTE [DISTWIDTH] IN BLOOD BY AUTOMATED COUNT: 13.1 % (ref 11.6–15.1)
GFR SERPL CREATININE-BSD FRML MDRD: 75 ML/MIN/1.73SQ M
GLUCOSE SERPL-MCNC: 149 MG/DL (ref 65–140)
GLUCOSE SERPL-MCNC: 156 MG/DL (ref 65–140)
GLUCOSE SERPL-MCNC: 174 MG/DL (ref 65–140)
GLUCOSE SERPL-MCNC: 248 MG/DL (ref 65–140)
GLUCOSE SERPL-MCNC: 341 MG/DL (ref 65–140)
HCO3 BLDV-SCNC: 39.5 MMOL/L (ref 24–30)
HCO3 BLDV-SCNC: 40.4 MMOL/L (ref 24–30)
HCO3 BLDV-SCNC: 43.8 MMOL/L (ref 24–30)
HCO3 BLDV-SCNC: 47.3 MMOL/L (ref 24–30)
HCT VFR BLD AUTO: 37.9 % (ref 36.5–49.3)
HGB BLD-MCNC: 10.7 G/DL (ref 12–17)
L PNEUMO1 AG UR QL IA.RAPID: NEGATIVE
LYMPHOCYTES # BLD AUTO: 0.32 THOUSAND/UL (ref 0.6–4.47)
LYMPHOCYTES # BLD AUTO: 3 % (ref 14–44)
MAGNESIUM SERPL-MCNC: 2.7 MG/DL (ref 1.9–2.7)
MCH RBC QN AUTO: 31.8 PG (ref 26.8–34.3)
MCHC RBC AUTO-ENTMCNC: 28.2 G/DL (ref 31.4–37.4)
MCV RBC AUTO: 113 FL (ref 82–98)
MONOCYTES # BLD AUTO: 0.11 THOUSAND/UL (ref 0–1.22)
MONOCYTES NFR BLD: 1 % (ref 4–12)
MUCOUS THREADS UR QL AUTO: ABNORMAL
NASAL CANNULA: 10
NEUTROPHILS # BLD MANUAL: 10.11 THOUSAND/UL (ref 1.85–7.62)
NEUTS BAND NFR BLD MANUAL: 13 % (ref 0–8)
NEUTS SEG NFR BLD AUTO: 83 % (ref 43–75)
NON VENT- BIPAP: ABNORMAL
NON-SQ EPI CELLS URNS QL MICRO: ABNORMAL /HPF
O2 CT BLDV-SCNC: 12.8 ML/DL
O2 CT BLDV-SCNC: 15 ML/DL
O2 CT BLDV-SCNC: 16.4 ML/DL
O2 CT BLDV-SCNC: 16.8 ML/DL
P AXIS: 79 DEGREES
PCO2 BLDV: 113.9 MM HG (ref 42–50)
PCO2 BLDV: 134.8 MM HG (ref 42–50)
PCO2 BLDV: 80.4 MM HG (ref 42–50)
PCO2 BLDV: 89.4 MM HG (ref 42–50)
PH BLDV: 7.16 [PH] (ref 7.3–7.4)
PH BLDV: 7.2 [PH] (ref 7.3–7.4)
PH BLDV: 7.26 [PH] (ref 7.3–7.4)
PH BLDV: 7.32 [PH] (ref 7.3–7.4)
PLATELET # BLD AUTO: 105 THOUSANDS/UL (ref 149–390)
PLATELET BLD QL SMEAR: ABNORMAL
PMV BLD AUTO: 9.8 FL (ref 8.9–12.7)
PO2 BLDV: 105.3 MM HG (ref 35–45)
PO2 BLDV: 46.9 MM HG (ref 35–45)
PO2 BLDV: 57.3 MM HG (ref 35–45)
PO2 BLDV: 92.1 MM HG (ref 35–45)
POTASSIUM SERPL-SCNC: 4.8 MMOL/L (ref 3.5–5.3)
PR INTERVAL: 136 MS
PROCALCITONIN SERPL-MCNC: 0.27 NG/ML
QRS AXIS: 85 DEGREES
QRSD INTERVAL: 84 MS
QT INTERVAL: 362 MS
QTC INTERVAL: 440 MS
RBC # BLD AUTO: 3.37 MILLION/UL (ref 3.88–5.62)
RBC #/AREA URNS AUTO: ABNORMAL /HPF
RBC MORPH BLD: NORMAL
S PNEUM AG UR QL: NEGATIVE
SODIUM SERPL-SCNC: 143 MMOL/L (ref 135–147)
T WAVE AXIS: 74 DEGREES
VENTRICULAR RATE: 89 BPM
WBC # BLD AUTO: 10.53 THOUSAND/UL (ref 4.31–10.16)
WBC #/AREA URNS AUTO: ABNORMAL /HPF

## 2024-09-04 PROCEDURE — 82805 BLOOD GASES W/O2 SATURATION: CPT

## 2024-09-04 PROCEDURE — 82805 BLOOD GASES W/O2 SATURATION: CPT | Performed by: HOSPITALIST

## 2024-09-04 PROCEDURE — 94660 CPAP INITIATION&MGMT: CPT

## 2024-09-04 PROCEDURE — 84145 PROCALCITONIN (PCT): CPT | Performed by: HOSPITALIST

## 2024-09-04 PROCEDURE — 85027 COMPLETE CBC AUTOMATED: CPT | Performed by: HOSPITALIST

## 2024-09-04 PROCEDURE — 87081 CULTURE SCREEN ONLY: CPT | Performed by: NURSE PRACTITIONER

## 2024-09-04 PROCEDURE — 93010 ELECTROCARDIOGRAM REPORT: CPT | Performed by: STUDENT IN AN ORGANIZED HEALTH CARE EDUCATION/TRAINING PROGRAM

## 2024-09-04 PROCEDURE — 94003 VENT MGMT INPAT SUBQ DAY: CPT

## 2024-09-04 PROCEDURE — 94760 N-INVAS EAR/PLS OXIMETRY 1: CPT

## 2024-09-04 PROCEDURE — 97163 PT EVAL HIGH COMPLEX 45 MIN: CPT

## 2024-09-04 PROCEDURE — 94640 AIRWAY INHALATION TREATMENT: CPT

## 2024-09-04 PROCEDURE — 36415 COLL VENOUS BLD VENIPUNCTURE: CPT

## 2024-09-04 PROCEDURE — 85007 BL SMEAR W/DIFF WBC COUNT: CPT | Performed by: HOSPITALIST

## 2024-09-04 PROCEDURE — 82948 REAGENT STRIP/BLOOD GLUCOSE: CPT

## 2024-09-04 PROCEDURE — 83735 ASSAY OF MAGNESIUM: CPT | Performed by: HOSPITALIST

## 2024-09-04 PROCEDURE — 80048 BASIC METABOLIC PNL TOTAL CA: CPT | Performed by: HOSPITALIST

## 2024-09-04 PROCEDURE — 99223 1ST HOSP IP/OBS HIGH 75: CPT | Performed by: INTERNAL MEDICINE

## 2024-09-04 PROCEDURE — 94664 DEMO&/EVAL PT USE INHALER: CPT

## 2024-09-04 RX ORDER — SODIUM CHLORIDE FOR INHALATION 0.9 %
3 VIAL, NEBULIZER (ML) INHALATION EVERY 6 HOURS PRN
Status: DISCONTINUED | OUTPATIENT
Start: 2024-09-04 | End: 2024-09-04

## 2024-09-04 RX ORDER — METHYLPREDNISOLONE SODIUM SUCCINATE 40 MG/ML
40 INJECTION, POWDER, LYOPHILIZED, FOR SOLUTION INTRAMUSCULAR; INTRAVENOUS EVERY 8 HOURS
Status: DISCONTINUED | OUTPATIENT
Start: 2024-09-04 | End: 2024-09-05

## 2024-09-04 RX ORDER — LEVALBUTEROL INHALATION SOLUTION 1.25 MG/3ML
1.25 SOLUTION RESPIRATORY (INHALATION)
Status: DISCONTINUED | OUTPATIENT
Start: 2024-09-04 | End: 2024-09-05

## 2024-09-04 RX ORDER — PANTOPRAZOLE SODIUM 40 MG/1
40 TABLET, DELAYED RELEASE ORAL
Status: DISCONTINUED | OUTPATIENT
Start: 2024-09-04 | End: 2024-09-07 | Stop reason: HOSPADM

## 2024-09-04 RX ORDER — LEVALBUTEROL INHALATION SOLUTION 1.25 MG/3ML
1.25 SOLUTION RESPIRATORY (INHALATION) EVERY 6 HOURS PRN
Status: DISCONTINUED | OUTPATIENT
Start: 2024-09-04 | End: 2024-09-04

## 2024-09-04 RX ORDER — AMOXICILLIN 250 MG
1 CAPSULE ORAL DAILY
Status: DISCONTINUED | OUTPATIENT
Start: 2024-09-04 | End: 2024-09-07 | Stop reason: HOSPADM

## 2024-09-04 RX ORDER — ACETAMINOPHEN 325 MG/1
650 TABLET ORAL EVERY 6 HOURS PRN
Status: DISCONTINUED | OUTPATIENT
Start: 2024-09-04 | End: 2024-09-07 | Stop reason: HOSPADM

## 2024-09-04 RX ORDER — LOSARTAN POTASSIUM 25 MG/1
25 TABLET ORAL DAILY
Status: DISCONTINUED | OUTPATIENT
Start: 2024-09-04 | End: 2024-09-07 | Stop reason: HOSPADM

## 2024-09-04 RX ORDER — INSULIN LISPRO 100 [IU]/ML
1-5 INJECTION, SOLUTION INTRAVENOUS; SUBCUTANEOUS
Status: DISCONTINUED | OUTPATIENT
Start: 2024-09-04 | End: 2024-09-05

## 2024-09-04 RX ORDER — BUDESONIDE 0.5 MG/2ML
0.5 INHALANT ORAL 2 TIMES DAILY
Status: DISCONTINUED | OUTPATIENT
Start: 2024-09-04 | End: 2024-09-05

## 2024-09-04 RX ORDER — IPRATROPIUM BROMIDE AND ALBUTEROL SULFATE 2.5; .5 MG/3ML; MG/3ML
3 SOLUTION RESPIRATORY (INHALATION) 2 TIMES DAILY
Status: DISCONTINUED | OUTPATIENT
Start: 2024-09-04 | End: 2024-09-04

## 2024-09-04 RX ORDER — LEVALBUTEROL INHALATION SOLUTION 1.25 MG/3ML
1.25 SOLUTION RESPIRATORY (INHALATION) EVERY 4 HOURS PRN
Status: DISCONTINUED | OUTPATIENT
Start: 2024-09-04 | End: 2024-09-05

## 2024-09-04 RX ORDER — GUAIFENESIN 600 MG/1
600 TABLET, EXTENDED RELEASE ORAL 2 TIMES DAILY
Status: DISCONTINUED | OUTPATIENT
Start: 2024-09-04 | End: 2024-09-07 | Stop reason: HOSPADM

## 2024-09-04 RX ORDER — FORMOTEROL FUMARATE 20 UG/2ML
20 SOLUTION RESPIRATORY (INHALATION) 2 TIMES DAILY
Status: DISCONTINUED | OUTPATIENT
Start: 2024-09-04 | End: 2024-09-05

## 2024-09-04 RX ORDER — ENOXAPARIN SODIUM 100 MG/ML
40 INJECTION SUBCUTANEOUS DAILY
Status: DISCONTINUED | OUTPATIENT
Start: 2024-09-04 | End: 2024-09-07 | Stop reason: HOSPADM

## 2024-09-04 RX ORDER — TORSEMIDE 10 MG/1
5 TABLET ORAL DAILY
Status: DISCONTINUED | OUTPATIENT
Start: 2024-09-04 | End: 2024-09-07 | Stop reason: HOSPADM

## 2024-09-04 RX ADMIN — IPRATROPIUM BROMIDE 0.5 MG: 0.5 SOLUTION RESPIRATORY (INHALATION) at 14:18

## 2024-09-04 RX ADMIN — LEVALBUTEROL HYDROCHLORIDE 1.25 MG: 1.25 SOLUTION RESPIRATORY (INHALATION) at 14:19

## 2024-09-04 RX ADMIN — INSULIN LISPRO 1 UNITS: 100 INJECTION, SOLUTION INTRAVENOUS; SUBCUTANEOUS at 15:56

## 2024-09-04 RX ADMIN — BUDESONIDE 0.5 MG: 0.5 INHALANT RESPIRATORY (INHALATION) at 19:37

## 2024-09-04 RX ADMIN — FORMOTEROL FUMARATE 20 MCG: 20 SOLUTION RESPIRATORY (INHALATION) at 19:37

## 2024-09-04 RX ADMIN — METHYLPREDNISOLONE SODIUM SUCCINATE 40 MG: 40 INJECTION, POWDER, FOR SOLUTION INTRAMUSCULAR; INTRAVENOUS at 03:55

## 2024-09-04 RX ADMIN — METHYLPREDNISOLONE SODIUM SUCCINATE 40 MG: 40 INJECTION, POWDER, FOR SOLUTION INTRAMUSCULAR; INTRAVENOUS at 11:42

## 2024-09-04 RX ADMIN — INSULIN LISPRO 4 UNITS: 100 INJECTION, SOLUTION INTRAVENOUS; SUBCUTANEOUS at 12:08

## 2024-09-04 RX ADMIN — CEFEPIME 2000 MG: 2 INJECTION, POWDER, FOR SOLUTION INTRAVENOUS at 22:14

## 2024-09-04 RX ADMIN — IPRATROPIUM BROMIDE 0.5 MG: 0.5 SOLUTION RESPIRATORY (INHALATION) at 07:42

## 2024-09-04 RX ADMIN — IPRATROPIUM BROMIDE 0.5 MG: 0.5 SOLUTION RESPIRATORY (INHALATION) at 04:03

## 2024-09-04 RX ADMIN — LEVALBUTEROL HYDROCHLORIDE 1.25 MG: 1.25 SOLUTION RESPIRATORY (INHALATION) at 04:04

## 2024-09-04 RX ADMIN — FORMOTEROL FUMARATE 20 MCG: 20 SOLUTION RESPIRATORY (INHALATION) at 07:59

## 2024-09-04 RX ADMIN — ENOXAPARIN SODIUM 40 MG: 40 INJECTION SUBCUTANEOUS at 09:08

## 2024-09-04 RX ADMIN — GUAIFENESIN 600 MG: 600 TABLET, EXTENDED RELEASE ORAL at 09:22

## 2024-09-04 RX ADMIN — METHYLPREDNISOLONE SODIUM SUCCINATE 40 MG: 40 INJECTION, POWDER, FOR SOLUTION INTRAMUSCULAR; INTRAVENOUS at 22:14

## 2024-09-04 RX ADMIN — SENNOSIDES AND DOCUSATE SODIUM 1 TABLET: 50; 8.6 TABLET ORAL at 09:24

## 2024-09-04 RX ADMIN — CEFEPIME 2000 MG: 2 INJECTION, POWDER, FOR SOLUTION INTRAVENOUS at 09:07

## 2024-09-04 RX ADMIN — BUDESONIDE 0.5 MG: 0.5 INHALANT RESPIRATORY (INHALATION) at 07:44

## 2024-09-04 RX ADMIN — LEVALBUTEROL HYDROCHLORIDE 1.25 MG: 1.25 SOLUTION RESPIRATORY (INHALATION) at 07:42

## 2024-09-04 RX ADMIN — TORSEMIDE 5 MG: 10 TABLET ORAL at 09:18

## 2024-09-04 RX ADMIN — IPRATROPIUM BROMIDE 0.5 MG: 0.5 SOLUTION RESPIRATORY (INHALATION) at 19:37

## 2024-09-04 RX ADMIN — LOSARTAN POTASSIUM 25 MG: 25 TABLET, FILM COATED ORAL at 09:18

## 2024-09-04 RX ADMIN — GUAIFENESIN 600 MG: 600 TABLET, EXTENDED RELEASE ORAL at 17:39

## 2024-09-04 NOTE — QUICK NOTE
Progress Note - Triage Asssessment   Severiano Feliciano 89 y.o. male MRN: [id number]    Time Called ( Time): 0000  Date Called: 09/04/24  Room#: ED4  Person requesting evaluation: Vaibhav Monet    Situation:    88 yo male with severe COPD here for COPD exacerbation. Known hx of noncompliance with COPD trmts. Baseline 4LNC and recommended OP bipap qhs. Initial VBG notable for fully compensated resp acidosis at baseline PCO2. Patient was placed on Bipap and repeat VBG was drawn showing a dvlping resp acidosis.     On my exam patient is sitting up a and ox3, conversing with family in no distress on Bipap. Lungs with mild end expiratory wheezes and decreased breath sounds.     Advised ED team to remove bipap and repeat VBG. Repeat VBG with near return to baseline PCO2.     Interventions:   Hold further Bipap at this time   Maintain SpO2 >88% with NC/midflow  OOB to chair daily  Albuterol and xoponex nebs TID  Start Solumedrol 40mg IV TID   Obtain strep/legionella Urine Ag  Obtain sputum culture. Start CTX for possible PNA trmt until sputum results         Triage Assessment:     Patient to be admitted to step down level of care    Recommendations discussed with Vaibhav Monet

## 2024-09-04 NOTE — CONSULTS
"Consultation - Pulmonary Medicine   Severiano Feliciano 89 y.o. male MRN: [id number]  Unit/Bed#: ICU 06 Encounter: [id number]      Assessment/Plan:    1.  Acute hypoxic/hypercapnic on chronic hypoxic/hypercapnic respiratory failure likely multifaceted as listed below         -Currently on 10 L, 97%, home O2 requirements 3 to 4 L         -Continue sats greater than 88%         -Pulmonary toileting: Deep breathing cough out of bed as tolerated incentive spirometry    2.  Severe JAY        -Compliance showed patient has not been using BiPAP        -Initial BiPAP settings: 12/5 PS4        -Patient has significant mask leak        -Patient will prefer nasal pillows        -Placed order for [employer] for nasal pillows    3.  Abnormal chest x-ray        -Small persistent right base opacity        -Given patient's recent change in mental status this is likely secondary aspiration event        -More likely pneumonitis vs acute infection        -Procalcitonin 0.8--0.27        -Day # 2-cefepime        -Will continue antibiotics for now until further microbiology        -Will obtain MRSA swab    4.  Very severe COPD without acute exacerbation        -Inpatient: Budesonide/Perforomist twice daily, Xopenex/Atrovent 3 times daily        -Home regimen: budesonide Perforomist twice daily, DuoNeb every 6 as needed, Proventil 2 puffs every 6 as needed        -No indication for steroid therapy at this time            History of Present Illness   Physician Requesting Consult: Marija Khan MD  Reason for Consult / Principal Problem: Hypercapnia  Hx and PE limited by: Nothing  Chief Complaint: \"I am ok\"  HPI: Severiano Feliciano is a 89 y.o.  male who presented to [facility] with complaints of shortness of breath.  Patient has past medical history of CHF, COPD, diverticulitis, diabetes, DVT, hypertension, and ulcer.  Patient arrived to ED via EMS after son called reporting shortness of breath altered mental status and " shakiness.  Patient reported having increasing shortness of breath and confusion over the past 4 to 5 hours prior to admission.  Upon ED admission patient remained hypoxic in the 80s on nasal cannula 10 L.  Patient was significantly hypercapnic and immediately required BiPAP.     Pulmonary was consulted for persistent hypercapnia.  Today upon examination patient was comfortably sleeping and was easily arousable.  No noted fevers nausea vomiting or pain.  Patient was recently transitioned off BiPAP to 10 L nasal cannula.     From pulmonary standpoint, patient follows with Dr. Hayward for his severe COPD.  Patient reports a 1 pack/day 35-year smoking history.  Patient reports he quit smoking in 2001.  PFTs in 2024 show very severe obstruction with an FEV1 of 25%.  Patient is currently maintained on budesonide Perforomist twice daily and DuoNeb 4 times daily.  Patient is currently maintained on 3 to 4 L throughout the day.  Patient does report some occupational exposures as he works as a .  Patient denies any history of seasonal allergies or postnasal drip.  Patient reports history of GERD in which she is maintained on Protonix.  Patient diagnosed with severe JAY and was prescribed BiPAP.  Patient denies any recent acute exposures to dust, mold, asbestos, or silica.    Inpatient consult to Pulmonology  Consult performed by: RAQUEL Franco  Consult ordered by: Oren Leyva PA-C          Review of Systems   Constitutional:  Negative for chills and fever.   HENT:  Negative for ear pain and sore throat.    Eyes:  Negative for pain and visual disturbance.   Respiratory:  Positive for shortness of breath. Negative for apnea, cough, choking, chest tightness, wheezing and stridor.    Cardiovascular:  Negative for chest pain and palpitations.   Gastrointestinal:  Negative for abdominal pain and vomiting.   Genitourinary:  Negative for dysuria and hematuria.   Musculoskeletal:  Negative for arthralgias and  back pain.   Skin:  Negative for color change and rash.   Neurological:  Negative for seizures and syncope.   Psychiatric/Behavioral:  Negative for agitation and behavioral problems.    All other systems reviewed and are negative.      Historical Information   Past Medical History:   Diagnosis Date    Acute metabolic encephalopathy 06/13/2020    Age-related cataract of right eye 04/04/2023    patient is medically cleared and presents with low risk of complication with this upcoming R cataract surgery.    Anemia     Aneurysm, aorta, thoracic (HCC)     Appendicolith     Ascending aortic aneurysm (HCC)     3.7    Asthma     BPH (benign prostatic hyperplasia)     CAD (coronary artery disease)     noted on CT scan    CHF (congestive heart failure) (HCC)     COPD (chronic obstructive pulmonary disease) (HCC)     Descending thoracic aortic aneurysm (HCC)     Diabetes mellitus (HCC)     Diverticulosis     Former tobacco use     GERD (gastroesophageal reflux disease)     History of DVT (deep vein thrombosis)     Left leg    History of transfusion     Hypertension     Hypoxemia 04/30/2023    Inguinal hernia     right    Nephrolithiasis     Oxygen dependent     2LNC    Oxygen dependent     Pneumonia     Pre-diabetes     Prostate calculus     PVD (peripheral vascular disease) (HCC)     Recurrent right inguinal hernia w incarcertion 01/04/2023    Thoracic aortic aneurysm without rupture (HCC) 11/19/2018    Added automatically from request for surgery [id number]    Thrombocytopenia (HCC) 12/29/2018    Ulcer     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)     Varicose vein of leg     b/l     Past Surgical History:   Procedure Laterality Date    CARDIAC SURGERY      ESOPHAGOGASTRODUODENOSCOPY      HERNIA REPAIR Right 1/21/2019    Procedure: REPAIR HERNIA INGUINAL WITH MESH;  Surgeon: David Lowry MD;  Location:  MAIN OR;  Service: General    HERNIA REPAIR Right 7/22/2023    Procedure: REPAIR RECURRENT INCARERATED  "HERNIA INGUINAL OPEN, RIGHT ORCHIECTOMY;  Surgeon: Mason Bertrand MD;  Location: [facility];  Service: General    INGUINAL HERNIA REPAIR Bilateral     IR TEVAR  2018    IA EVASC RPR DTA COVERAGE ART ORIGIN 1ST ENDOPROSTH N/A 2018    Procedure: TEVAR - endovascular thoracic aortic aneurysm repair;  Surgeon: Gladis Ortega MD;  Location: [facility];  Service: Vascular    THORACIC AORTIC ANEURYSM REPAIR  2018    VARICOSE VEIN SURGERY Bilateral     vein stripping     Social History   Social History     Substance and Sexual Activity   Alcohol Use Never     Social History     Substance and Sexual Activity   Drug Use No     Social History     Tobacco Use   Smoking Status Former    Current packs/day: 0.00    Average packs/day: 1 pack/day for 35.0 years (35.0 ttl pk-yrs)    Types: Cigarettes    Start date:     Quit date:     Years since quittin.6   Smokeless Tobacco Never     E-Cigarette/Vaping    E-Cigarette Use Never User      E-Cigarette/Vaping Substances    Nicotine No     THC No     CBD No     Flavoring No     Other No     Unknown No      Occupational History:     Family History:   Family History   Problem Relation Age of Onset    Tuberculosis Mother     No Known Problems Father     Cancer Sister     Diabetes Family     Hypertension Family        Meds/Allergies   pertinent pulmonary meds have been reviewed    Allergies   Allergen Reactions    Penicillins Hives, Itching and Rash    Lisinopril Rash     Side pains and rash     Lasix [Furosemide] Other (See Comments)     unknown    Zyprexa [Olanzapine] Tongue Swelling       Objective   Vitals: Blood pressure 121/77, pulse 78, temperature 97.5 °F (36.4 °C), temperature source Axillary, resp. rate 16, height 5' 3\" (1.6 m), weight 59.2 kg (130 lb 8.2 oz), SpO2 97%.,Body mass index is 23.12 kg/m².    Intake/Output Summary (Last 24 hours) at 2024 1058  Last data filed at 2024 0710  Gross per 24 hour   Intake 300 ml   Output 200 ml " "  Net 100 ml     Invasive Devices       Peripheral Intravenous Line  Duration             Peripheral IV 09/03/24 Left Antecubital <1 day    Peripheral IV 09/04/24 Dorsal (posterior);Left Hand <1 day                    Physical Exam  Constitutional:       General: He is not in acute distress.     Appearance: Normal appearance. He is normal weight. He is not ill-appearing.   HENT:      Nose: Nose normal. No congestion or rhinorrhea.      Mouth/Throat:      Mouth: Mucous membranes are dry.      Pharynx: Oropharynx is clear.   Cardiovascular:      Rate and Rhythm: Normal rate and regular rhythm.      Pulses: Normal pulses.      Heart sounds: Normal heart sounds. No murmur heard.     No friction rub. No gallop.   Pulmonary:      Effort: Pulmonary effort is normal. No respiratory distress.      Breath sounds: No stridor. Wheezing present. No rhonchi or rales.      Comments: Scattered wheezing  Chest:      Chest wall: No tenderness.   Abdominal:      General: Abdomen is flat. Bowel sounds are normal. There is no distension.      Palpations: Abdomen is soft. There is no mass.   Musculoskeletal:         General: No swelling or tenderness. Normal range of motion.      Cervical back: Normal range of motion. No rigidity or tenderness.   Skin:     General: Skin is warm and dry.      Coloration: Skin is not jaundiced or pale.   Neurological:      General: No focal deficit present.      Mental Status: He is alert and oriented to person, place, and time. Mental status is at baseline.   Psychiatric:         Mood and Affect: Mood normal.         Behavior: Behavior normal.         Lab Results: I have personally reviewed pertinent lab results., ABG: No results found for: \"PHART\", \"RCI0YFG\", \"PO2ART\", \"MBB7JXM\", \"O6XGHVRM\", \"BEART\", \"SOURCE\", BNP:   Lab Results   Component Value Date    BNP 36 09/03/2024   , CBC:   Lab Results   Component Value Date    WBC 10.53 (H) 09/04/2024    HGB 10.7 (L) 09/04/2024    HCT 37.9 09/04/2024     " "(H) 09/04/2024     (L) 09/04/2024    RBC 3.37 (L) 09/04/2024    MCH 31.8 09/04/2024    MCHC 28.2 (L) 09/04/2024    RDW 13.1 09/04/2024    MPV 9.8 09/04/2024    NRBC 0 09/03/2024   , CMP:   Lab Results   Component Value Date    SODIUM 143 09/04/2024    K 4.8 09/04/2024    CL 96 09/04/2024    CO2 44 (H) 09/04/2024    BUN 28 (H) 09/04/2024    CREATININE 0.91 09/04/2024    CALCIUM 9.4 09/04/2024    AST 30 09/03/2024    ALT 24 09/03/2024    ALKPHOS 66 09/03/2024    EGFR 75 09/04/2024   , PT/INR:   Lab Results   Component Value Date    INR 1.03 09/03/2024   , Troponin: No results found for: \"TROPONINI\"        Imaging Studies: I have personally reviewed pertinent films in PACS     Cxr-moderate opacity in right base    EKG, Pathology, and Other Studies: I have personally reviewed pertinent films in PACS       Left Ventricle: Left ventricular cavity size is normal. Wall thickness is moderately increased. Systolic function is normal. Wall motion is normal. Diastolic function is mildly abnormal, consistent with grade I (abnormal) relaxation.    Tricuspid Valve: There is mild regurgitation. The right ventricular systolic pressure is mildly elevated. The estimated right ventricular systolic pressure is 39.00 mmHg.     Findings    Left Ventricle Left ventricular cavity size is normal. Wall thickness is moderately increased. Systolic function is normal.  Wall motion is normal. Diastolic function is mildly abnormal, consistent with grade I (abnormal) relaxation.   Right Ventricle Right ventricular cavity size is normal. Systolic function is normal. Wall thickness is normal.   Left Atrium The atrium is normal in size.   Right Atrium The atrium is normal in size.   Aortic Valve The aortic valve is trileaflet. The leaflets are not thickened. The leaflets are mildly calcified. The leaflets exhibit normal mobility. There is no evidence of regurgitation. The aortic valve has no significant stenosis.   Mitral Valve Mitral valve " "structure is normal. There is trace regurgitation. There is no evidence of stenosis.   Tricuspid Valve Tricuspid valve structure is normal. There is mild regurgitation. There is no evidence of stenosis. The right ventricular systolic pressure is mildly elevated. The estimated right ventricular systolic pressure is 39.00 mmHg.   Pulmonic Valve Pulmonic valve structure is normal. There is trace regurgitation. There is no evidence of stenosis.   Ascending Aorta The aortic root is normal in size.   IVC/SVC The right atrial pressure is estimated at 3.0 mmHg. The inferior vena cava is normal in size. Respirophasic changes were normal.   Pericardium There is no pericardial effusion. The pericardium is normal in appearance.       Pulmonary Results (PFTs, PSG): I have personally reviewed pertinent films in PACS     Spirometry:  FEV1/FVC Ratio is 42%.  FEV1 is 0.54 L, 25% predicted.  FVC is 1.29 L, 44% predicted.     Flow volume loop:  Severely obstructive appearance     ABG on 3 L nasal cannula:  pH: 7.32  pCO2: 73 mmHg  pO2 :56 mmHg  HCO3: 37.4      IMPRESSION:  Very severe obstructive ventilatory flow limitation with severely obstructed flow-volume loop  ABG on 3 L consistent with acute on chronic respiratory acidosis with hypoxemia       Code Status: Level 1 - Full Code    Portions of the record may have been created with voice recognition software.  Occasional wrong word or \"sound a like\" substitutions may have occurred due to the inherent limitations of voice recognition software.  Read the chart carefully and recognize, using context, where substitutions have occurred.  "

## 2024-09-04 NOTE — UTILIZATION REVIEW
Initial Clinical Review    ED TREATMENT TIME  9/3  @  2059    Admission: Date/Time/Statement:   Admission Orders (From admission, onward)       Ordered        09/04/24 0126  INPATIENT ADMISSION  Once                          Orders Placed This Encounter   Procedures    INPATIENT ADMISSION     Standing Status:   Standing     Number of Occurrences:   1     Order Specific Question:   Level of Care     Answer:   Level 2 Stepdown / HOT [14]     Order Specific Question:   Estimated length of stay     Answer:   More than 2 Midnights     Order Specific Question:   Certification     Answer:   I certify that inpatient services are medically necessary for this patient for a duration of greater than two midnights. See H&P and MD Progress Notes for additional information about the patient's course of treatment.     ED Arrival Information       Expected   -    Arrival   9/3/2024 20:52    Acuity   Emergent              Means of arrival   Ambulance    Escorted by   [facility] ([facility])    Service   Hospitalist    Admission type   Emergency              Arrival complaint   Shortness of breath             Chief Complaint   Patient presents with    Shortness of Breath     Pt arrives  via ems. Pt son reports SOB, altered mental status, and shakiness. Pt is non communicable through . Unable to assess orientation. Pt on oxygen at home, pt using increased O2 at home.        Initial Presentation: 89 y.o. male presents to ED  via  EMS from home after family noticed increasing confusion,  SOB and wheezing.  Admitted  1 month ago with same  symptoms.PMH  is   chronic respiratory failure  on  O2  3L  NC,  essential hypertension,  CHF,  and  COPD.  CXR shows  RLL infiltrate/atelectasis/chronic scarring.  Met  SIRS  criteria with tachycardia and tachypnea, afebrile, WBC   9.67.  Low suspicion for  infectious etiology.  Initially  required BiPap,  but weaned  to MFNC  at bedside. Admit  Ip  Stepdown  LOC  with   Acute/chronic  respiratory failure with hypoxia and hypercapnia,  Acute e xacerbation  COPD, SIRS  and plan is   pulmonary consult, monitor labs, blood cultures, RAF, nebulizers,  IV  s/medrol,  placed  back on  BiPap,   daily weight, I & O  and continue home meds.        Anticipated Length of Stay/Certification Statement: Patient will be admitted on an inpatient basis with an anticipated length of stay of greater than 2 midnights secondary to COPD with acute exacerbation.       ED Triage Vitals   Temperature Pulse Respirations Blood Pressure SpO2 Pain Score   09/03/24 2101 09/03/24 2101 09/03/24 2101 09/03/24 2115 09/03/24 2101 09/04/24 0410   100.2 °F (37.9 °C) 101 22 (!) 172/132 91 % No Pain     Weight (last 2 days)       Date/Time Weight    09/04/24 0539 59.2 (130.51)    09/04/24 0333 59.2 (130.51)            Vital Signs (last 3 days)       Date/Time Temp Pulse Resp BP MAP (mmHg) SpO2 FiO2 (%) Calculated FIO2 (%) - Nasal Cannula O2 Flow Rate (L/min) Nasal Cannula O2 Flow Rate (L/min) O2 Device O2 Interface Device Patient Position - Orthostatic VS [place] Coma Scale Score Pain    09/04/24 0800 -- 78 16 121/77 93 97 % -- -- -- -- -- -- -- 15 No Pain    09/04/24 0746 -- -- -- -- -- -- -- -- -- -- -- Full face mask -- -- --    09/04/24 0745 -- -- -- -- -- -- 40 -- -- -- BiPAP -- -- -- --    09/04/24 0600 -- 77 23 135/62 89 96 % -- -- -- -- -- -- -- -- --    09/04/24 0500 -- 74 21 131/58 84 97 % -- -- -- -- -- -- -- -- --    09/04/24 0430 97.5 °F (36.4 °C) 77 26 132/62 89 95 % -- -- -- -- -- -- -- -- --    09/04/24 0410 -- -- -- -- -- -- -- -- -- -- -- -- -- 15 No Pain    09/04/24 0404 -- -- -- -- -- 93 % -- -- -- -- -- Full face mask -- -- --    09/04/24 0333 96.6 °F (35.9 °C) 88 52 160/101 126 98 % -- -- -- -- -- -- -- -- --    09/04/24 0300 -- 86 26 128/62 89 98 % -- -- -- -- Mid flow nasal cannula -- Lying -- --    09/04/24 0230 -- 85 22 139/64 92 98 % -- 60 -- 10 L/min Mid flow nasal cannula -- Sitting -- --     09/04/24 0200 -- 83 22 137/63 90 98 % -- 60 -- 10 L/min Mid flow nasal cannula -- Sitting -- --    09/04/24 0130 -- 88 24 126/62 89 97 % -- 60 -- 10 L/min Mid flow nasal cannula -- Sitting -- --    09/04/24 0100 -- 88 29 126/59 85 95 % -- 60 -- 10 L/min Mid flow nasal cannula -- Sitting -- --    09/04/24 0030 -- 92 24 131/60 86 97 % -- 60 -- 10 L/min Mid flow nasal cannula -- Sitting -- --    09/04/24 0000 -- 89 24 145/74 101 97 % -- -- -- -- Mid flow nasal cannula -- Sitting -- --    09/03/24 2355 -- -- -- -- -- 95 % -- -- -- -- Mid flow nasal cannula -- -- -- --    09/03/24 2342 -- -- -- -- -- 92 % -- 60 -- 10 L/min Nasal cannula -- -- -- --    09/03/24 2341 -- -- -- -- -- 85 % -- -- -- -- None (Room air) -- -- -- --    09/03/24 2309 -- -- -- -- -- 95 % -- -- -- -- -- Full face mask -- -- --    09/03/24 2308 -- -- -- -- -- 90 % -- -- -- -- -- -- -- -- --    09/03/24 2300 -- 86 24 120/67 88 92 % -- -- -- -- BiPAP -- Lying -- --    09/03/24 2230 -- 89 22 119/59 83 92 % -- -- -- -- BiPAP -- Lying -- --    09/03/24 2130 -- 91 22 133/60 87 95 % -- -- -- -- BiPAP -- Lying -- --    09/03/24 2116 -- -- -- -- -- 93 % 50 -- -- -- BiPAP Full face mask -- -- --    09/03/24 2115 -- 115 24 172/132 148 80 %  60 60 -- 10 L/min Aerosol mask -- Lying -- --    09/03/24 2106 -- -- -- -- -- -- -- -- 5 L/min -- Simple mask -- -- -- --    09/03/24 2104 -- -- -- -- -- -- -- -- -- -- -- -- -- 13 --    09/03/24 2101 100.2 °F (37.9 °C) 101 22 -- -- 91 % -- 44 -- 6 L/min Simple mask -- Lying -- --              Pertinent Labs/Diagnostic Test Results:   Radiology:  XR chest 1 view portable   ED Interpretation by Vaibhav Monet DO (09/03 2205)   Abnormal   Right lower lobe infiltrate      Final Interpretation by Kelsy Rivera MD (09/04 0947)      Moderate opacity in the right base, question persistent/recurrent pneumonia as that is the site of pneumonia on the chest CT from 8/8/2024 versus postinfectious scar.      Emphysema.             Workstation performed: [id number]           Cardiology:  ECG 12 lead   Final Result by Paul London MD (09/04 0803)   Sinus rhythm with Premature atrial complexes   Possible Anterior infarct , age undetermined   Abnormal ECG   When compared with ECG of 03-SEP-2024 20:59,   No significant change was found   Confirmed by Paul London ([id number]) on 9/4/2024 8:03:06 AM        Results from last 7 days   Lab Units 09/03/24 2113   SARS-COV-2  Negative     Results from last 7 days   Lab Units 09/04/24  0555 09/03/24 2114   WBC Thousand/uL 10.53* 9.67   HEMOGLOBIN g/dL 10.7* 11.0*   HEMATOCRIT % 37.9 39.0   PLATELETS Thousands/uL 105* 125*   TOTAL NEUT ABS Thousands/µL  --  6.98   BANDS PCT % 13*  --          Results from last 7 days   Lab Units 09/04/24  0555 09/03/24 2114   SODIUM mmol/L 143 145   POTASSIUM mmol/L 4.8 4.4   CHLORIDE mmol/L 96 98   CO2 mmol/L 44* >45*   ANION GAP mmol/L 3*  --    BUN mg/dL 28* 24   CREATININE mg/dL 0.91 0.82   EGFR ml/min/1.73sq m 75 79   CALCIUM mg/dL 9.4 9.3   MAGNESIUM mg/dL 2.7  --      Results from last 7 days   Lab Units 09/03/24 2114   AST U/L 30   ALT U/L 24   ALK PHOS U/L 66   TOTAL PROTEIN g/dL 6.8   ALBUMIN g/dL 3.7   TOTAL BILIRUBIN mg/dL 0.41         Results from last 7 days   Lab Units 09/04/24  0555 09/03/24  2114   GLUCOSE RANDOM mg/dL 248* 168*         Results from last 7 days   Lab Units 09/04/24  0840 09/04/24  0555 09/04/24  0311   PH DUSTIN  7.263* 7.203* 7.163*   PCO2 DUSTIN mm Hg 89.4* 113.9* 134.8*   PO2 DUSTIN mm Hg 57.3* 46.9* 105.3*   HCO3 DUSTIN mmol/L 39.5* 43.8* 47.3*   BASE EXC DUSTIN mmol/L 9.3 11.7 13.7   O2 CONTENT DUSTIN ml/dL 15.0 12.8 16.8   O2 HGB, VENOUS % 86.7* 76.1 96.3*             Results from last 7 days   Lab Units 09/03/24  2317 09/03/24 2114   HS TNI 0HR ng/L  --  15   HS TNI 2HR ng/L 15  --    HSTNI D2 ng/L 0  --          Results from last 7 days   Lab Units 09/03/24  2114   PROTIME seconds 13.7   INR  1.03   PTT seconds 23          Results from last 7 days   Lab Units 09/04/24  0555 09/03/24 2114   PROCALCITONIN ng/ml 0.27* 0.08     Results from last 7 days   Lab Units 09/03/24  2114   LACTIC ACID mmol/L 1.3             Results from last 7 days   Lab Units 09/03/24  2114   BNP pg/mL 36           Results from last 7 days   Lab Units 09/03/24  2344   CLARITY UA  Clear   COLOR UA  Yellow   SPEC GRAV UA  1.033*   PH UA  6.0   GLUCOSE UA mg/dl Negative   KETONES UA mg/dl Negative   BLOOD UA  Negative   PROTEIN UA mg/dl 30 (1+)*   NITRITE UA  Negative   BILIRUBIN UA  Negative   UROBILINOGEN UA (BE) mg/dl <2.0   LEUKOCYTES UA  Negative   WBC UA /hpf 1-2   RBC UA /hpf 1-2   BACTERIA UA /hpf None Seen   EPITHELIAL CELLS WET PREP /hpf Occasional   MUCUS THREADS  Occasional*     Results from last 7 days   Lab Units 09/03/24  2344 09/03/24 2113   STREP PNEUMONIAE ANTIGEN, URINE  Negative  --    LEGIONELLA URINARY ANTIGEN  Negative  --    INFLUENZA A PCR   --  Negative   INFLUENZA B PCR   --  Negative   RSV PCR   --  Negative           Results from last 7 days   Lab Units 09/03/24  2124 09/03/24 2113   BLOOD CULTURE  Received in Microbiology Lab. Culture in Progress. Received in Microbiology Lab. Culture in Progress.                   ED Treatment-Medication Administration from 09/03/2024 2052 to 09/04/2024 0326         Date/Time Order Dose Route Action     09/03/2024 2118 albuterol (FOR EMS ONLY) (2.5 mg/3 mL) 0.083 % inhalation solution 2.5 mg 0 mg Does not apply Given to EMS     09/03/2024 2126 cefepime (MAXIPIME) 2 g/50 mL dextrose IVPB 2,000 mg Intravenous New Bag     09/03/2024 2213 doxycycline (VIBRAMYCIN) 100 mg in sodium chloride 0.9 % 100 mL IVPB 100 mg Intravenous New Bag     09/03/2024 2116 albuterol inhalation solution 10 mg 10 mg Nebulization Given     09/03/2024 2116 ipratropium (ATROVENT) 0.02 % inhalation solution 1 mg 1 mg Nebulization Given     09/03/2024 2116 sodium chloride 0.9 % inhalation solution 12 mL 12 mL Nebulization Given      09/03/2024 2119 magnesium sulfate 2 g/50 mL IVPB (premix) 2 g 2 g Intravenous New Bag     09/03/2024 2115 methylPREDNISolone sodium succinate (Solu-MEDROL) injection 125 mg 125 mg Intravenous Given     09/03/2024 2116 acetaminophen (Ofirmev) injection 1,000 mg 1,000 mg Intravenous New Bag     09/03/2024 2309 albuterol inhalation solution 10 mg 10 mg Nebulization Given     09/03/2024 2309 ipratropium (ATROVENT) 0.02 % inhalation solution 1 mg 1 mg Nebulization Given     09/03/2024 2310 sodium chloride 0.9 % inhalation solution 12 mL 12 mL Nebulization Given              Present on Admission:   COPD with acute exacerbation (HCC)   Acute on chronic respiratory failure with hypoxia and hypercapnia (HCC)   Benign essential hypertension   Chronic diastolic (congestive) heart failure (HCC)      Admitting Diagnosis: Shortness of breath [R06.02]  COPD with acute exacerbation (HCC) [J44.1]  Acute hypoxic respiratory failure (HCC) [J96.01]  Age/Sex: 89 y.o. male  Admission Orders:  Scheduled Medications:  budesonide, 0.5 mg, Nebulization, BID  cefepime, 2,000 mg, Intravenous, Q12H  enoxaparin, 40 mg, Subcutaneous, Daily  formoterol, 20 mcg, Nebulization, BID  guaiFENesin, 600 mg, Oral, BID  ipratropium, 0.5 mg, Nebulization, TID  levalbuterol, 1.25 mg, Nebulization, TID  losartan, 25 mg, Oral, Daily  methylPREDNISolone sodium succinate, 40 mg, Intravenous, Q8H  pantoprazole, 40 mg, Oral, Early Morning  senna-docusate sodium, 1 tablet, Oral, Daily  torsemide, 5 mg, Oral, Daily      Continuous IV Infusions:     PRN Meds:  acetaminophen, 650 mg, Oral, Q6H PRN  ipratropium, 0.5 mg, Nebulization, Q4H PRN  levalbuterol, 1.25 mg, Nebulization, Q4H PRN        IP CONSULT TO PULMONOLOGY    Network Utilization Review Department  ATTENTION: Please call with any questions or concerns to [phone] and carefully listen to the prompts so that you are directed to the right person. All voicemails are confidential.   For Discharge needs,  contact Care Management DC Support Team at [phone] opt. 2  Send all requests for admission clinical reviews, approved or denied determinations and any other requests to dedicated fax number below belonging to the campus where the patient is receiving treatment. List of dedicated fax numbers for the Facilities:  FACILITY NAME UR FAX NUMBER   ADMISSION DENIALS (Administrative/Medical Necessity) [phone]   DISCHARGE SUPPORT TEAM (NETWORK) [phone]   PARENT CHILD HEALTH (Maternity/NICU/Pediatrics) [phone]   [facility] [phone]   [facility] [phone]   [facility] [phone]   [facility] [phone]   [facility] [phone]   [facility] [phone]   [facility] [phone]   [facility] [phone]   [facility] [phone]   [facility] [phone]   [facility] [phone]   [facility] [phone]

## 2024-09-04 NOTE — ASSESSMENT & PLAN NOTE
Pt fulfilling SIRS with tachycardia and tachypnea  WBC 9.67, afebrile  Denies fevers, chills, new infectious symptoms  CXR similar to previous, possible RLL v. chronic scarring/atelectasis demonstrated on previous imaging, official read pending   Procalc 0.08, repeat in AM  Sputum culture pending   Urine legionella and strep pneumoniae pending  Viral panel negative  Blood cultures pending  Low suspicion for infectious etiology at this time  Cefepime initiated in the ED, will continue pending pulm consult  Monitor for signs of worsening infection, trend fever curve

## 2024-09-04 NOTE — PLAN OF CARE
Problem: PAIN - ADULT  Goal: Verbalizes/displays adequate comfort level or baseline comfort level  Description: Interventions:  - Encourage patient to monitor pain and request assistance  - Assess pain using appropriate pain scale  - Administer analgesics based on type and severity of pain and evaluate response  - Implement non-pharmacological measures as appropriate and evaluate response  - Consider cultural and social influences on pain and pain management  - Notify physician/advanced practitioner if interventions unsuccessful or patient reports new pain  Outcome: Progressing     Problem: INFECTION - ADULT  Goal: Absence or prevention of progression during hospitalization  Description: INTERVENTIONS:  - Assess and monitor for signs and symptoms of infection  - Monitor lab/diagnostic results  - Monitor all insertion sites, i.e. indwelling lines, tubes, and drains  - Monitor endotracheal if appropriate and nasal secretions for changes in amount and color  - [place] appropriate cooling/warming therapies per order  - Administer medications as ordered  - Instruct and encourage patient and family to use good hand hygiene technique  - Identify and instruct in appropriate isolation precautions for identified infection/condition  Outcome: Progressing  Goal: Absence of fever/infection during neutropenic period  Description: INTERVENTIONS:  - Monitor WBC    Outcome: Progressing     Problem: SAFETY ADULT  Goal: Patient will remain free of falls  Description: INTERVENTIONS:  - Educate patient/family on patient safety including physical limitations  - Instruct patient to call for assistance with activity   - Consult OT/PT to assist with strengthening/mobility   - Keep Call bell within reach  - Keep bed low and locked with side rails adjusted as appropriate  - Keep care items and personal belongings within reach  - Initiate and maintain comfort rounds  - Make Fall Risk Sign visible to staff  - Apply yellow socks and bracelet  for high fall risk patients  - Consider moving patient to room near nurses station  Outcome: Progressing  Goal: Maintain or return to baseline ADL function  Description: INTERVENTIONS:  -  Assess patient's ability to carry out ADLs; assess patient's baseline for ADL function and identify physical deficits which impact ability to perform ADLs (bathing, care of mouth/teeth, toileting, grooming, dressing, etc.)  - Assess/evaluate cause of self-care deficits   - Assess range of motion  - Assess patient's mobility; develop plan if impaired  - Assess patient's need for assistive devices and provide as appropriate  - Encourage maximum independence but intervene and supervise when necessary  - Involve family in performance of ADLs  - Assess for home care needs following discharge   - Consider OT consult to assist with ADL evaluation and planning for discharge  - Provide patient education as appropriate  Outcome: Progressing  Goal: Maintains/Returns to pre admission functional level  Description: INTERVENTIONS:  - Perform AM-PAC 6 Click Basic Mobility/ Daily Activity assessment daily.  - Set and communicate daily mobility goal to care team and patient/family/caregiver.   - Collaborate with rehabilitation services on mobility goals if consulted  - Out of bed for toileting  - Record patient progress and toleration of activity level   Outcome: Progressing     Problem: DISCHARGE PLANNING  Goal: Discharge to home or other facility with appropriate resources  Description: INTERVENTIONS:  - Identify barriers to discharge w/patient and caregiver  - Arrange for needed discharge resources and transportation as appropriate  - Identify discharge learning needs (meds, wound care, etc.)  - Arrange for interpretive services to assist at discharge as needed  - Refer to Case Management Department for coordinating discharge planning if the patient needs post-hospital services based on physician/advanced practitioner order or complex needs  related to functional status, cognitive ability, or social support system  Outcome: Progressing     Problem: Knowledge Deficit  Goal: Patient/family/caregiver demonstrates understanding of disease process, treatment plan, medications, and discharge instructions  Description: Complete learning assessment and assess knowledge base.  Interventions:  - Provide teaching at level of understanding  - Provide teaching via preferred learning methods  Outcome: Progressing

## 2024-09-04 NOTE — PHYSICAL THERAPY NOTE
PHYSICAL THERAPY EVALUATION NOTE       09/04/24 1210   PT Last Visit   PT Visit Date 09/04/24   Note Type   Note type Evaluation   Pain Assessment   Pain Assessment Tool 0-10   Pain Score No Pain   Restrictions/Precautions   Weight Bearing Precautions Per Order No   Other Precautions 1:1;Chair Alarm;Bed Alarm;Multiple lines;Telemetry;O2;Fall Risk   Home Living   Type of Home House   Home Layout Two level;1/2 bath on main level;Bed/bath upstairs;Stairs to enter with rails   Bathroom Shower/Tub Tub/shower unit   Bathroom Toilet Standard   Bathroom Equipment Shower chair   Home Equipment Walker;Cane;Wheelchair-manual   Prior Function   Level of [place] Needs assistance with ADLs;Needs assistance with functional mobility;Needs assistance with IADLS   Lives With Spouse   Receives Help From Family  (Son is the caregiver M-F for 8 hrs)   IADLs Independent with driving;Independent with meal prep;Independent with medication management   Falls in the last 6 months 0   Comments pt poor historian - acquired information from charting   General   Family/Caregiver Present No   Cognition   Overall Cognitive Status Impaired   Arousal/Participation Responsive   Orientation Level Disoriented X4   Following Commands Follows one step commands with increased time or repetition   Comments pt [place], garbled speech   Subjective   Subjective Patient requiring to use the bathroom   RUE Assessment   RUE Assessment WFL   LUE Assessment   LUE Assessment WFL   RLE Assessment   RLE Assessment WFL   LLE Assessment   LLE Assessment WFL  (patient unable to follow formal MMT assessment for UE/LE - assessment through function)   Bed Mobility   Supine to Sit 5  Supervision   Sit to Supine 5  Supervision   Additional Comments increased time to execute due to difficulty following commands   Transfers   Sit to Stand 5  Supervision   Additional items Assist x 1;Impulsive   Stand to Sit 5  Supervision   Additional items  Assist x 1;Impulsive;Verbal cues   Additional Comments pt motivated by need to toilet, pt refused to stand again and sit in a chair after he returned to EOB from using the toilet. Max education provided re: benefits of sitting up in chair and mobilizing more however patient continued to refuse   Ambulation/Elevation   Gait pattern Antalgic;Wide RICHARD   Gait Assistance 5  Supervision  (CGA with assist for line negotiation)   Additional items Assist x 1;Tactile cues;Verbal cues   Distance 4', 4'   Balance   Static Sitting Fair +   Dynamic Sitting Fair   Static Standing Poor +   Dynamic Standing Poor +   Ambulatory Poor +   Endurance Deficit   Endurance Deficit Yes   Endurance Deficit Description Mildly SOB with activity   Activity Tolerance   Activity Tolerance Treatment limited secondary to medical complications (Comment)  (pt refusing to mobilize again after using toilet)   Medical Staff Made Aware yes   Nurse Made Aware yes   Assessment   Prognosis Good   Problem List Decreased strength;Decreased endurance;Impaired balance;Decreased mobility;Decreased cognition;Impaired judgement;Decreased safety awareness   Assessment 88 y/o male with complex medical history admitted with acute hypoxic/hypercapnic on chronic hypoxic/hypercapnic respiratory failure requiring ICU level of care. Unable to acquire information from the patient due to his garbled speech, hard of hearing and language barrier. Per notes patient lives at home with his spouse, receives 24 hr care from his family (8hrs M-F from son) and mobilizes with a cane vs. FWW. Today patient was able to mobilize OOB and walk short distances back and forth  to the ICU toilet for toileting. Noted stable vitals throughout - maintaining Spo2 >96% on supplemental oxygen without oxygen titration needed. Evaluation limited by patient's resistance to mobilize again after toileting however, based on what he currently demonstrated, anticipate patient is close to baseline  functionally. Patient will continue to benefit from skilled therapy to optimize his long-term functional prognosis with goal of returning home with f/u home PT.   Barriers to Discharge None  (based on information from the chart)   Goals   Patient Goals To go home   STG Expiration Date 09/18/24   Short Term Goal #1 1. Mod ind supine<>sit; 2. Mod Ind sit<>stand with FWW; 3. Sup ambulation >50' with FWW; 4. Supervision x1 FOS/rail   PT Treatment Day 0   Plan   Treatment/Interventions Functional transfer training;LE strengthening/ROM;Therapeutic exercise;Endurance training;Patient/family training;Equipment eval/education;Bed mobility;Gait training;Continued evaluation   PT Frequency 2-3x/wk   Discharge Recommendation   Rehab Resource Intensity Level, PT III (Minimum Resource Intensity)   AM-PAC Basic Mobility Inpatient   Turning in Flat Bed Without Bedrails 3   Lying on Back to Sitting on Edge of Flat Bed Without Bedrails 3   Moving Bed to Chair 3   Standing Up From Chair Using Arms 3   Walk in Room 3   Climb 3-5 Stairs With Railing 2   Basic Mobility Inpatient Raw Score 17   Basic Mobility Standardized Score 39.67   [facility] Highest Level Of Mobility   -[facility] Goal 5: Stand one or more mins   -[facility] Achieved 6: Walk 10 steps or more   End of Consult   Patient Position at End of Consult Supine;All needs within reach;Bed/Chair alarm activated     The patient's AM-PAC Basic Mobility Inpatient Short Form Raw Score is 17. A Raw score of greater than 16 suggests the patient may benefit from discharge to home. Please also refer to the recommendation of the Physical Therapist for safe discharge planning.      Hx/personal factors: co-morbidities, advanced age, mutliple lines, telemetry, use of AD, dec cognition, fall risk, assist w/ ADL's, and O2  Examination: dec mobility, dec balance, dec endurance, dec amb, risk for falls, dec cognition, assessed body system, balance, endurance, amb, D/C disposition & fall risk,  impairements in locomotion, musculoskeletal, balance, endurance, posture, coordination  Clinical: unpredictable (ongoing medical status, abnormal lab values, risk for falls, and imaging test/result pending)  Complexity: high      Patient Name: Severiano Feliciano  Today's Date: 9/4/2024    Treva Davison, PT, DPT

## 2024-09-04 NOTE — H&P
[facility]  H&P  Name: Severiano Feliciano 89 y.o. male I MRN: [id number]  Unit/Bed#: ICU 06 I Date of Admission: 9/3/2024   Date of Service: 9/4/2024 I Hospital Day: 0      Assessment & Plan   * Acute on chronic respiratory failure with hypoxia and hypercapnia (HCC)  Assessment & Plan  Patient with PMHx of frequent COPD exacerbations secondary to severe COPD, chronic respiratory failure typically maintained on 3 L nasal cannula, HTN, and CHF presented to the ED secondary to increased SOB, wheezing, and confusion  Pt initially presented requiring the BiPAP, however able to be weened to midflow NC at the bedside by CC  Pt evaluated by critical care who deemed patient appropriate for SD2  CXR similar to previous, possible RLL v. chronic scarring/atelectasis demonstrated on previous imaging, official read pending   pCO2 73.7->92.8->80.4  Continue to monitor VBG closely  Continue BiPAP hs and prn  Suspect acute resp failure most likely secondary to COPD exacerbation  Resp protocol  Ween oxygen as tolerated  Pulm consult pending  Rest of plan as below. See COPD and SIRS    COPD with acute exacerbation (HCC)  Assessment & Plan  Pt presented to the ED with shortness of breath and wheezing, improved somewhat following therapies  CXR similar to previous, possible RLL v. chronic scarring/atelectasis demonstrated on previous imaging, official read pending   Suspect COPD exacerbation in the setting of GOLD E COPD  Scheduled nebs  IV solumedrol   Continue home inhaler regimen  Pulm consult pending      SIRS (systemic inflammatory response syndrome) (HCC)  Assessment & Plan  Pt fulfilling SIRS with tachycardia and tachypnea  WBC 9.67, afebrile  Denies fevers, chills, new infectious symptoms  CXR similar to previous, possible RLL v. chronic scarring/atelectasis demonstrated on previous imaging, official read pending   Procalc 0.08, repeat in AM  Sputum culture pending   Urine legionella and strep  pneumoniae pending  Viral panel negative  Blood cultures pending  Low suspicion for infectious etiology at this time  Cefepime initiated in the ED, will continue pending pulm consult  Monitor for signs of worsening infection, trend fever curve    Chronic diastolic (congestive) heart failure (HCC)  Assessment & Plan  Wt Readings from Last 3 Encounters:   08/13/24 58.1 kg (128 lb)   07/10/24 59 kg (130 lb)   06/27/24 55.1 kg (121 lb 7.6 oz)     Pt appears euvolemic on exam  BNP 36  Last ECHO 4/2023 EF 66% grade I diastolic dysfunction  Daily weights, I/O  Continue home torsemide          Benign essential hypertension  Assessment & Plan  Continue losartan and torsemide           VTE Pharmacologic Prophylaxis: VTE Score: 6 High Risk (Score >/= 5) - Pharmacological DVT Prophylaxis Ordered: enoxaparin (Lovenox). Sequential Compression Devices Ordered.  Code Status: Level 1 - Full Code   Discussion with family: Updated  (wife) at bedside.    Anticipated Length of Stay: Patient will be admitted on an inpatient basis with an anticipated length of stay of greater than 2 midnights secondary to COPD with acute exacerbation.    Total Time Spent on Date of Encounter in care of patient: 75 mins. This time was spent on one or more of the following: performing physical exam; counseling and coordination of care; obtaining or reviewing history; documenting in the medical record; reviewing/ordering tests, medications or procedures; communicating with other healthcare professionals and discussing with patient's family/caregivers.    Chief Complaint: per patient family, patient with increased SOB and wheezing prior to arrival    History of Present Illness:  Severiano Feliciano is a 89 y.o. male who presents with acute on chronic resp failure in the setting of COPD exacerbation. Patient family reports that they noticed patient SOB, wheezing, and increasingly confused prompting them to bring him to the hospital. Pt recently  admitted last month with similar presentation.     Review of Systems:  Review of Systems   Constitutional:  Negative for appetite change, chills, diaphoresis, fatigue and fever.   HENT:  Negative for congestion, rhinorrhea and sore throat.    Eyes:  Negative for photophobia and visual disturbance.   Respiratory:  Positive for shortness of breath and wheezing. Negative for cough.    Cardiovascular:  Negative for chest pain, palpitations and leg swelling.   Gastrointestinal:  Negative for abdominal distention, abdominal pain, blood in stool, constipation, diarrhea, nausea and vomiting.   Genitourinary:  Negative for dysuria and hematuria.   Musculoskeletal:  Negative for arthralgias, back pain, myalgias and neck stiffness.   Skin:  Negative for color change and rash.   Neurological:  Negative for dizziness, seizures, syncope, weakness, light-headedness and headaches.   Psychiatric/Behavioral:  Positive for confusion (reported by family). Negative for agitation and behavioral problems. The patient is not nervous/anxious.    All other systems reviewed and are negative.      Past Medical and Surgical History:   Past Medical History:   Diagnosis Date    Acute metabolic encephalopathy 06/13/2020    Age-related cataract of right eye 04/04/2023    patient is medically cleared and presents with low risk of complication with this upcoming R cataract surgery.    Anemia     Aneurysm, aorta, thoracic (HCC)     Appendicolith     Ascending aortic aneurysm (HCC)     3.7    Asthma     BPH (benign prostatic hyperplasia)     CAD (coronary artery disease)     noted on CT scan    CHF (congestive heart failure) (HCC)     COPD (chronic obstructive pulmonary disease) (HCC)     Descending thoracic aortic aneurysm (HCC)     Diabetes mellitus (HCC)     Diverticulosis     Former tobacco use     GERD (gastroesophageal reflux disease)     History of DVT (deep vein thrombosis)     Left leg    History of transfusion     Hypertension     Hypoxemia  04/30/2023    Inguinal hernia     right    Nephrolithiasis     Oxygen dependent     2LNC    Oxygen dependent     Pneumonia     Pre-diabetes     Prostate calculus     PVD (peripheral vascular disease) (HCC)     Recurrent right inguinal hernia w incarcertion 01/04/2023    Thoracic aortic aneurysm without rupture ([facility]) 11/19/2018    Added automatically from request for surgery [id number]    Thrombocytopenia (HCC) 12/29/2018    Ulcer     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[facility])     Varicose vein of leg     b/l       Past Surgical History:   Procedure Laterality Date    CARDIAC SURGERY      ESOPHAGOGASTRODUODENOSCOPY      HERNIA REPAIR Right 1/21/2019    Procedure: REPAIR HERNIA INGUINAL WITH MESH;  Surgeon: David Lowry MD;  Location:  MAIN OR;  Service: General    HERNIA REPAIR Right 7/22/2023    Procedure: REPAIR RECURRENT INCARERATED HERNIA INGUINAL OPEN, RIGHT ORCHIECTOMY;  Surgeon: Mason Bertrand MD;  Location: [facility];  Service: General    INGUINAL HERNIA REPAIR Bilateral     IR TEVAR  12/27/2018    CT EVASC RPR DTA COVERAGE ART ORIGIN 1ST ENDOPROSTH N/A 12/27/2018    Procedure: TEVAR - endovascular thoracic aortic aneurysm repair;  Surgeon: Gladis Ortega MD;  Location: [facility];  Service: Vascular    THORACIC AORTIC ANEURYSM REPAIR  12/27/2018    VARICOSE VEIN SURGERY Bilateral     vein stripping       Meds/Allergies:  Prior to Admission medications    Medication Sig Start Date End Date Taking? Authorizing Provider   acetaminophen (TYLENOL) 650 mg CR tablet TAKE 1 TABLET (650 MG TOTAL) BY MOUTH EVERY 8 (EIGHT) HOURS AS NEEDED FOR MILD PAIN 7/9/24   Kirby Casanova MD   albuterol (PROVENTIL HFA,VENTOLIN HFA) 90 mcg/act inhaler INHALE 2 PUFFS EVERY 6 (SIX) HOURS AS NEEDED FOR WHEEZINGINHALE 2 PUFFS CADA 6 (SIX) HOURS AS NEEDED PARA WHEEZING 8/18/24   Kirby Casanova MD   budesonide (Pulmicort) 0.5 mg/2 mL nebulizer solution Take 2 mL (0.5 mg total) by nebulization 2 (two) times a day Rinse  mouth after use. 5/3/24 4/28/25  Buddy Hayward MD   formoterol (PERFOROMIST) 20 MCG/2ML nebulizer solution Take 2 mL (20 mcg total) by nebulization 2 (two) times a day 5/3/24 4/28/25  Buddy Hayward MD   ipratropium-albuterol (DUO-NEB) 0.5-2.5 mg/3 mL nebulizer solution Take 3 mL by nebulization 2 (two) times a day 5/3/24 4/28/25  Buddy Hayward MD   ketoconazole (NIZORAL) 2 % shampoo Apply 1 Application topically 2 (two) times a week 24   Kirby Casanova MD   losartan (COZAAR) 25 mg tablet TAKE ONE TABLET BY MOUTH TWICE DAILYTOMAR 1 TABLETA POR VIA ORAL DOS VECES AL EULALIO 3/6/24   Jessie Menendez PA-C   mometasone (ELOCON) 0.1 % lotion Apply topically daily 7/10/24   Kirby Casanova MD   pantoprazole (PROTONIX) 40 mg tablet TAKE 1 TABLET (40 MG TOTAL) BY MOUTH DAILY 24   Kirby Casanova MD   Senna Plus 8.6-50 MG per tablet TAKE 1 TABLET BY MOUTH DAILY 24   Kirby Casanova MD   torsemide (DEMADEX) 5 MG tablet Take 1 tablet (5 mg total) by mouth daily 7/10/24   Kirby Casanova MD     I have reviewed home medications using recent Epic encounter.    Allergies:   Allergies   Allergen Reactions    Penicillins Hives, Itching and Rash    Lisinopril Rash     Side pains and rash     Lasix [Furosemide] Other (See Comments)     unknown    Zyprexa [Olanzapine] Tongue Swelling       Social History:  Marital Status: /Civil Union   Patient Pre-hospital Living Situation: Home  Patient Pre-hospital Level of Mobility: walks with walker  Patient Pre-hospital Diet Restrictions: cardiac, dental soft  Substance Use History:   Social History     Substance and Sexual Activity   Alcohol Use Never     Social History     Tobacco Use   Smoking Status Former    Current packs/day: 0.00    Average packs/day: 1 pack/day for 35.0 years (35.0 ttl pk-yrs)    Types: Cigarettes    Start date:     Quit date:     Years since quittin.6   Smokeless Tobacco Never     Social History     Substance and Sexual  "Activity   Drug Use No       Family History:  Family History   Problem Relation Age of Onset    Tuberculosis Mother     No Known Problems Father     Cancer Sister     Diabetes Family     Hypertension Family        Physical Exam:     Vitals:   Blood Pressure: 131/58 (09/04/24 0500)  Pulse: 74 (09/04/24 0500)  Temperature: 97.5 °F (36.4 °C) (09/04/24 0430)  Temp Source: Axillary (09/04/24 0430)  Respirations: 21 (09/04/24 0500)  Height: 5' 3\" (160 cm) (09/04/24 0333)  Weight - Scale: 59.2 kg (130 lb 8.2 oz) (09/04/24 0539)  SpO2: 97 % (09/04/24 0500)    Physical Exam  Vitals and nursing note reviewed.   Constitutional:       General: He is not in acute distress.     Appearance: He is well-developed. He is ill-appearing (chronically).   HENT:      Head: Normocephalic and atraumatic.      Nose: Nose normal. No congestion.      Mouth/Throat:      Mouth: Mucous membranes are dry.      Pharynx: Oropharynx is clear.   Eyes:      Conjunctiva/sclera: Conjunctivae normal.   Cardiovascular:      Rate and Rhythm: Normal rate and regular rhythm.      Heart sounds: Normal heart sounds. No murmur heard.     No friction rub. No gallop.   Pulmonary:      Effort: Pulmonary effort is normal. No respiratory distress.      Breath sounds: Wheezing (diffuse expiratory wheezing) present. No rhonchi or rales.   Abdominal:      General: Bowel sounds are normal. There is no distension.      Palpations: Abdomen is soft.      Tenderness: There is no abdominal tenderness.   Musculoskeletal:      Cervical back: Neck supple.      Right lower leg: No edema.      Left lower leg: No edema.   Skin:     General: Skin is warm and dry.      Capillary Refill: Capillary refill takes less than 2 seconds.   Neurological:      General: No focal deficit present.      Mental Status: He is alert. Mental status is at baseline.   Psychiatric:         Mood and Affect: Mood normal.         Behavior: Behavior normal.          Additional Data:     Lab Results:  Results " from last 7 days   Lab Units 09/03/24 2114   WBC Thousand/uL 9.67   HEMOGLOBIN g/dL 11.0*   HEMATOCRIT % 39.0   PLATELETS Thousands/uL 125*   SEGS PCT % 72   LYMPHO PCT % 10*   MONO PCT % 14*   EOS PCT % 4     Results from last 7 days   Lab Units 09/03/24 2114   SODIUM mmol/L 145   POTASSIUM mmol/L 4.4   CHLORIDE mmol/L 98   CO2 mmol/L >45*   BUN mg/dL 24   CREATININE mg/dL 0.82   CALCIUM mg/dL 9.3   ALBUMIN g/dL 3.7   TOTAL BILIRUBIN mg/dL 0.41   ALK PHOS U/L 66   ALT U/L 24   AST U/L 30   GLUCOSE RANDOM mg/dL 168*     Results from last 7 days   Lab Units 09/03/24 2114   INR  1.03         Lab Results   Component Value Date    HGBA1C 6.2 12/09/2022    HGBA1C 6.2 (H) 06/13/2020    HGBA1C 6.3 (H) 06/12/2020     Results from last 7 days   Lab Units 09/03/24 2114   LACTIC ACID mmol/L 1.3   PROCALCITONIN ng/ml 0.08       Lines/Drains:  Invasive Devices       Peripheral Intravenous Line  Duration             Peripheral IV 09/03/24 Left Antecubital <1 day    Peripheral IV 09/04/24 Dorsal (posterior);Left Hand <1 day                        Imaging: Personally reviewed the following imaging: chest xray  XR chest 1 view portable   ED Interpretation by Vaibhav Monet DO (09/03 2205)   Abnormal   Right lower lobe infiltrate          EKG and Other Studies Reviewed on Admission:   EKG: NSR. HR 89.     ** Please Note: This note has been constructed using a voice recognition system. **

## 2024-09-04 NOTE — ED NOTES
Per provider, Autumn CHAO pt okay to go upstairs after provider evaluation prior to orders being placed     Francisca Steward RN  09/04/24 1782

## 2024-09-04 NOTE — CASE MANAGEMENT
Case Management Assessment & Discharge Planning Note    Patient name Severiano Feliciano  Location ICU 06/ICU 06 MRN [id number]  : 1935 Date 2024       Current Admission Date: 9/3/2024  Current Admission Diagnosis:Acute on chronic respiratory failure with hypoxia and hypercapnia (HCC)   Patient Active Problem List    Diagnosis Date Noted Date Diagnosed    SIRS (systemic inflammatory response syndrome) (HCC) 2024     Elevated troponin 2024     Abnormal CT of the abdomen 2024     Delirium 2024     Chronic bilateral low back pain without sciatica 2024     Pulmonary hypertension (HCC) 2024     Aneurysm, carotid artery, internal 2024     Unspecified severe protein-calorie malnutrition (HCC) 01/15/2024     Macrocytosis 2023     Constipation 2023     Prediabetes 10/25/2023     Bilateral hearing loss 10/25/2023     PAC (premature atrial contraction) 2023     Vitamin B12 deficiency 2023     Vitamin D deficiency 2023     Weight loss 2022     Rectal bleeding 2021     Hyperlipidemia 2021     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     Dysphagia 2020     TIA (transient ischemic attack) 2020     S/P hernia repair 2019     Acquired renal cyst of left kidney 2019     Chronic diastolic (congestive) heart failure (HCC) 2019     Thrombocytopenia (HCC) 2018     Varicose veins of both lower extremities with pain 2018     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HCC) 2018     Acute on chronic respiratory failure with hypoxia and hypercapnia (HCC) 2018     Descending aortic aneurysm (HCC) 2018     History of DVT (deep vein thrombosis) 2018     COPD with acute exacerbation (HCC) 2018     Benign essential hypertension 2017       LOS (days): 0  Geometric Mean LOS (GMLOS) (days):   Days to GMLOS:     OBJECTIVE:  PATIENT READMITTED  TO HOSPITAL  Risk of Unplanned Readmission Score: 38.58         Current admission status: Inpatient     Preferred Pharmacy:   [facility] - [place], [place] - [address]  [address]  [address]  [facility] [address]  Phone: [phone] Fax: [phone]    [facility] - [place] - [address]  [address]  [place] [address]  Phone: [phone] Fax: [phone]    Primary Care Provider: Kirby Casanova MD    Primary Insurance: [employer]  REP  Secondary Insurance: [employer] [facility]    ASSESSMENT:  Active Health Care Proxies       Viridiana Zambrano Health Care Representative - Spouse   Primary Phone: [phone] (Mobile)  Home Phone: [phone]                 Advance Directives  Does patient have a Health Care POA?: No  Was patient offered paperwork?: Yes  Does patient currently have a Health Care decision maker?: Yes, please see Health Care Proxy section  Does patient have Advance Directives?: No  Was patient offered paperwork?: Yes  Primary Contact: Julio Severiano ( son) [phone]       Patient Information  Admitted from:: Home  Mental Status: Alert, Confused  During Assessment patient was accompanied by: Son  Assessment information provided by:: Son  Primary Caregiver: Family  Caregiver's Name:: Julio Severiano ( son) [phone]  Caregiver's Relationship to Patient:: Family Member  Caregiver's Telephone Number:: Julio Severiano ( waldo) [phone]  Support Systems: Spouse/significant other  County of Residence: [place]  What city do you live in?: [place]  Home entry access options. Select all that apply.: Stairs  Number of steps to enter home.: One Flight  Do the steps have railings?: Yes  Type of Current Residence: 2 story home  Upon entering residence, is there a bedroom on the main floor (no further steps)?: No  A bedroom is located on the following floor levels of residence (select all that apply):: 2nd  Floor  Upon entering residence, is there a bathroom on the main floor (no further steps)?: Yes  Number of steps to 2nd floor from main floor: One Flight  Living Arrangements: Lives w/ Spouse/significant other  Is patient a ?: No    Activities of Daily Living Prior to Admission  Functional Status: Total dependent  Completes ADLs independently?: No  Level of ADL dependence: Total Dependent  Ambulates independently?: No  Level of ambulatory dependence: Assistance  Does patient use assisted devices?: Yes  Assisted Devices (DME) used: Walker, Straight Cane, Home Oxygen concentrator, Shower Chair, Other (Comment), BiPAP (Bipap)  DME Company Name (respiratory supplies): [employer]  Does patient currently own DME?: Yes  What DME does the patient currently own?: BiPAP, Home Oxygen concentrator, Shower Chair, Walker, Straight Cane  Does patient have a history of Outpatient Therapy (PT/OT)?: Yes  Does the patient have a history of Short-Term Rehab?: Yes (Cardiac rehab)  Does patient have a history of HHC?: Yes ([facility])  Does patient currently have HHC?: Yes    Current Home Health Care  Type of Current Home Care Services: Nurse visit  Current Home Health Agency:: [facility]  Current Home Health Follow-Up Provider:: PCP    Patient Information Continued  Income Source: Pension/penitentiary  Does patient have prescription coverage?: Yes  Does patient receive dialysis treatments?: No  Does patient have a history of substance abuse?: No  Does patient have a history of Mental Health Diagnosis?: No    PHQ 2/9 Screening   Reviewed PHQ 2/9 Depression Screening Score?: No    Means of Transportation  Means of Transport to Appts:: Family transport      Social Determinants of Health (SDOH)      Flowsheet Row Most Recent Value   Housing Stability    In the last 12 months, was there a time when you were not able to pay the mortgage or rent on time? N   In the past 12 months, how many times have you moved where you were living? 0   At any  time in the past 12 months, were you homeless or living in a shelter (including now)? N   Transportation Needs    In the past 12 months, has lack of transportation kept you from medical appointments or from getting medications? no   In the past 12 months, has lack of transportation kept you from meetings, work, or from getting things needed for daily living? No   Food Insecurity    Within the past 12 months, you worried that your food would run out before you got the money to buy more. Never true   Within the past 12 months, the food you bought just didn't last and you didn't have money to get more. Never true   Utilities    In the past 12 months has the electric, gas, oil, or water company threatened to shut off services in your home? No            DISCHARGE DETAILS:    Discharge planning discussed with:: patient's son Daljit  Freedom of Choice: Yes  Comments - Freedom of Choice: Home with HHC vs PT recommendation  CM contacted family/caregiver?: Yes (son is at the bedside)  Were Treatment Team discharge recommendations reviewed with patient/caregiver?: Yes  Did patient/caregiver verbalize understanding of patient care needs?: Yes  Were patient/caregiver advised of the risks associated with not following Treatment Team discharge recommendations?: Yes    Contacts  Patient Contacts: Julio Severiano  Son  [phone]  Relationship to Patient:: Family  Reason/Outcome: Continuity of Care, Emergency Contact, Discharge Planning    Requested Home Health Care         Is the patient interested in HHC at discharge?: Yes  Home Health Discipline requested:: Nursing, Occupational Therapy, Physical Therapy  [facility] Reason (only applicable if external HHA name selected): Patient has established relationship with provider  Home Health Follow-Up Provider:: PCP  Home Health Services Needed:: Oxygen Via Nasal Cannula, Strengthening/Theraputic Exercises to Improve Function, Evaluate  Functional Status and Safety  Oxygen LPM Ordered (if applicable based on home health services needed)::  (BIPAP)  Homebound Criteria Met:: Uses an Assist Device (i.e. cane, walker, etc), Requires the Assistance of Another Person for Safe Ambulation or to Leave the Home  Supporting Clincal Findings:: Requires Oxygen, Fatigues Easliy in Short Distances, Limited Endurance    DME Referral Provided  Referral made for DME?: No    Other Referral/Resources/Interventions Provided:  Interventions: HHC  Referral Comments: Return referral for [facility]       Treatment Team Recommendation: Home with Home Health Care  Discharge Destination Plan:: Home with Home Health Care       Additional Comments: CM met with the patient's son at the bedside for intake assessment and discharge planning. CM introduced self and reviewed role. PT recommended patient be discharged home with [facility], family is in agreement. CM placed a THONY referral to [facility] care. CM department will continue to follow patient through hospital discharge.

## 2024-09-04 NOTE — QUICK NOTE
Discussed VBG with critical care AP, recommendations appreciated  pH 7.163  pCO2 134.8    PLAN  Place patient back on BiPAP with adjusted settings  Repeat VBG to monitor for improvement  Pt to remain SD2 for now

## 2024-09-04 NOTE — PLAN OF CARE
Problem: PHYSICAL THERAPY ADULT  Goal: Performs mobility at highest level of function for planned discharge setting.  See evaluation for individualized goals.  Description: Treatment/Interventions: Functional transfer training, LE strengthening/ROM, Therapeutic exercise, Endurance training, Patient/family training, Equipment eval/education, Bed mobility, Gait training, Continued evaluation          See flowsheet documentation for full assessment, interventions and recommendations.  Note: Prognosis: Good  Problem List: Decreased strength, Decreased endurance, Impaired balance, Decreased mobility, Decreased cognition, Impaired judgement, Decreased safety awareness    Assessment: 89 y/o male with complex medical history admitted with acute hypoxic/hypercapnic on chronic hypoxic/hypercapnic respiratory failure requiring ICU level of care. Unable to acquire information from the patient due to his garbled speech, hard of hearing and language barrier. Per notes patient lives at home with his spouse, receives 24 hr care from his family (8hrs M-F from son) and mobilizes with a cane vs. FWW. Today patient was able to mobilize OOB and walk short distances back and forth  to the ICU toilet for toileting. Noted stable vitals throughout - maintaining Spo2 >96% on supplemental oxygen without oxygen titration needed. Evaluation limited by patient's resistance to mobilize again after toileting however, based on what he currently demonstrated, anticipate patient is close to baseline functionally. Patient will continue to benefit from skilled therapy to optimize his long-term functional prognosis with goal of returning home with f/u home PT.    Barriers to Discharge: None (based on information from the chart)     Rehab Resource Intensity Level, PT: III (Minimum Resource Intensity)    See flowsheet documentation for full assessment.

## 2024-09-04 NOTE — SEPSIS NOTE
Sepsis Note   Severiano Feliciano 89 y.o. male MRN: [id number]  Unit/Bed#: ED-04 Encounter: [id number]       Initial Sepsis Screening       Row Name 09/03/24 2108                Is the patient's history suggestive of a new or worsening infection? Yes (Proceed)  -GJ        Suspected source of infection pneumonia  -GJ        Indicate SIRS criteria Tachycardia > 90 bpm;Tachypnea > 20 resp per min  -GJ        Are two or more of the above signs & symptoms of infection both present and new to the patient? Yes (Proceed)  -GJ        Assess for evidence of organ dysfunction: Are any of the below criteria present within 6 hours of suspected infection and SIRS criteria that are NOT considered to be chronic conditions? --                  User Key  (r) = Recorded By, (t) = Taken By, (c) = Cosigned By      Initials Name Provider Type    SENA Monet DO Physician                        There is no height or weight on file to calculate BMI.  Wt Readings from Last 1 Encounters:   08/13/24 58.1 kg (128 lb)        Ideal body weight: 56.9 kg (125 lb 7.1 oz)  Adjusted ideal body weight: 57.4 kg (126 lb 7.4 oz)

## 2024-09-04 NOTE — ED PROVIDER NOTES
History  Chief Complaint   Patient presents with    Shortness of Breath     Pt arrives  via ems. Pt son reports SOB, altered mental status, and shakiness. Pt is non communicable through . Unable to assess orientation. Pt on oxygen at home, pt using increased O2 at home.      Patient is an 89-year-old male with a significant past medical history of severe COPD on chronic oxygen, CAD, CHF, presenting for evaluation of shortness of breath and reported confusion.  Patient unable to provide a significant history despite use of a .  History primarily obtained through EMS.  As per EMS, the patient reportedly started experiencing increased shortness of breath and confusion over this past hour or so.  Family became concerned and subsequently called them for evaluation.  Patient reportedly had an increase in his oxygen requirements at home, although EMS is unsure of exactly how much.  Of note, the patient was recently admitted last month with a similar presentation.  He this was thought to be in the setting of a COPD exacerbation with aspiration pneumonitis.  He had a negative CTA for PE at that point in time.  He was hospitalized for 3 days and discharged with improvement in symptoms.  He was recommended palliative consult during his hospitalization which they declined.        Prior to Admission Medications   Prescriptions Last Dose Informant Patient Reported? Taking?   Senna Plus 8.6-50 MG per tablet 9/3/2024  No Yes   Sig: TAKE 1 TABLET BY MOUTH DAILY   acetaminophen (TYLENOL) 650 mg CR tablet Past Week  No Yes   Sig: TAKE 1 TABLET (650 MG TOTAL) BY MOUTH EVERY 8 (EIGHT) HOURS AS NEEDED FOR MILD PAIN   albuterol (PROVENTIL HFA,VENTOLIN HFA) 90 mcg/act inhaler 9/3/2024  No Yes   Sig: INHALE 2 PUFFS EVERY 6 (SIX) HOURS AS NEEDED FOR WHEEZINGINHALE 2 PUFFS CADA 6 (SIX) HOURS AS NEEDED PARA WHEEZING   budesonide (Pulmicort) 0.5 mg/2 mL nebulizer solution 9/3/2024  No Yes   Sig: Take 2 mL (0.5 mg  total) by nebulization 2 (two) times a day Rinse mouth after use.   formoterol (PERFOROMIST) 20 MCG/2ML nebulizer solution 9/3/2024  No Yes   Sig: Take 2 mL (20 mcg total) by nebulization 2 (two) times a day   ipratropium-albuterol (DUO-NEB) 0.5-2.5 mg/3 mL nebulizer solution 9/3/2024  No Yes   Sig: Take 3 mL by nebulization 2 (two) times a day   ketoconazole (NIZORAL) 2 % shampoo   No No   Sig: Apply 1 Application topically 2 (two) times a week   losartan (COZAAR) 25 mg tablet 9/3/2024  No Yes   Sig: TAKE ONE TABLET BY MOUTH TWICE DAILYTOMAR 1 TABLETA POR VIA ORAL DOS VECES AL EULALIO   mometasone (ELOCON) 0.1 % lotion   No No   Sig: Apply topically daily   pantoprazole (PROTONIX) 40 mg tablet 9/3/2024  No Yes   Sig: TAKE 1 TABLET (40 MG TOTAL) BY MOUTH DAILY   torsemide (DEMADEX) 5 MG tablet 9/3/2024  No Yes   Sig: Take 1 tablet (5 mg total) by mouth daily      Facility-Administered Medications: None       Past Medical History:   Diagnosis Date    Acute metabolic encephalopathy 06/13/2020    Age-related cataract of right eye 04/04/2023    patient is medically cleared and presents with low risk of complication with this upcoming R cataract surgery.    Anemia     Aneurysm, aorta, thoracic (HCC)     Appendicolith     Ascending aortic aneurysm (HCC)     3.7    Asthma     BPH (benign prostatic hyperplasia)     CAD (coronary artery disease)     noted on CT scan    CHF (congestive heart failure) (HCC)     COPD (chronic obstructive pulmonary disease) (HCC)     Descending thoracic aortic aneurysm (HCC)     Diabetes mellitus (HCC)     Diverticulosis     Former tobacco use     GERD (gastroesophageal reflux disease)     History of DVT (deep vein thrombosis)     Left leg    History of transfusion     Hypertension     Hypoxemia 04/30/2023    Inguinal hernia     right    Nephrolithiasis     Oxygen dependent     2LNC    Oxygen dependent     Pneumonia     Pre-diabetes     Prostate calculus     PVD (peripheral vascular disease) ([facility])      Recurrent right inguinal hernia w incarcertion 2023    Thoracic aortic aneurysm without rupture (HCC) 2018    Added automatically from request for surgery [id number]    Thrombocytopenia (HCC) 2018    Ulcer     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)     Varicose vein of leg     b/l       Past Surgical History:   Procedure Laterality Date    CARDIAC SURGERY      ESOPHAGOGASTRODUODENOSCOPY      HERNIA REPAIR Right 2019    Procedure: REPAIR HERNIA INGUINAL WITH MESH;  Surgeon: David Lowry MD;  Location: [facility];  Service: General    HERNIA REPAIR Right 2023    Procedure: REPAIR RECURRENT INCARERATED HERNIA INGUINAL OPEN, RIGHT ORCHIECTOMY;  Surgeon: Mason Bertrand MD;  Location: [facility];  Service: General    INGUINAL HERNIA REPAIR Bilateral     IR TEVAR  2018    MA EVASC RPR DTA COVERAGE ART ORIGIN 1ST ENDOPROSTH N/A 2018    Procedure: TEVAR - endovascular thoracic aortic aneurysm repair;  Surgeon: Gladis Ortega MD;  Location: [facility];  Service: Vascular    THORACIC AORTIC ANEURYSM REPAIR  2018    VARICOSE VEIN SURGERY Bilateral     vein stripping       Family History   Problem Relation Age of Onset    Tuberculosis Mother     No Known Problems Father     Cancer Sister     Diabetes Family     Hypertension Family      I have reviewed and agree with the history as documented.    E-Cigarette/Vaping    E-Cigarette Use Never User      E-Cigarette/Vaping Substances    Nicotine No     THC No     CBD No     Flavoring No     Other No     Unknown No      Social History     Tobacco Use    Smoking status: Former     Current packs/day: 0.00     Average packs/day: 1 pack/day for 35.0 years (35.0 ttl pk-yrs)     Types: Cigarettes     Start date:      Quit date:      Years since quittin.6    Smokeless tobacco: Never   Vaping Use    Vaping status: Never Used   Substance Use Topics    Alcohol use: Never    Drug use: No       Review of Systems    Unable to perform ROS: Mental status change       Physical Exam  Physical Exam  Constitutional:       General: He is in acute distress.      Appearance: He is ill-appearing.   HENT:      Head: Normocephalic and atraumatic.      Right Ear: External ear normal.      Left Ear: External ear normal.      Mouth/Throat:      Mouth: Mucous membranes are dry.   Cardiovascular:      Rate and Rhythm: Regular rhythm. Tachycardia present.   Pulmonary:      Effort: Tachypnea present.      Breath sounds: Decreased breath sounds and wheezing present.   Abdominal:      General: Abdomen is flat.      Palpations: Abdomen is soft.   Musculoskeletal:      Right lower leg: No edema.      Left lower leg: No edema.   Skin:     General: Skin is warm and dry.         Vital Signs  ED Triage Vitals   Temperature Pulse Respirations Blood Pressure SpO2   09/03/24 2101 09/03/24 2101 09/03/24 2101 09/03/24 2115 09/03/24 2101   100.2 °F (37.9 °C) 101 22 (!) 172/132 91 %      Temp Source Heart Rate Source Patient Position - Orthostatic VS BP Location FiO2 (%)   09/03/24 2101 09/03/24 2101 09/03/24 2101 09/03/24 2101 09/03/24 2115   Oral Monitor Lying Right arm 60      Pain Score       09/04/24 0410       No Pain           Vitals:    09/06/24 0705 09/06/24 1506 09/06/24 2054 09/07/24 0709   BP: 151/80 115/76 166/90 166/90   Pulse: 74 84 87 89   Patient Position - Orthostatic VS:             Visual Acuity  Visual Acuity      Flowsheet Row Most Recent Value   L Pupil Size (mm) 2   R Pupil Size (mm) 3   L Pupil Shape Oval   R Pupil Shape Round            ED Medications  Medications   losartan (COZAAR) tablet 25 mg (25 mg Oral Given 9/7/24 0816)   pantoprazole (PROTONIX) EC tablet 40 mg (40 mg Oral Given 9/7/24 0509)   torsemide (DEMADEX) tablet 5 mg (5 mg Oral Given 9/7/24 0816)   senna-docusate sodium (SENOKOT S) 8.6-50 mg per tablet 1 tablet (1 tablet Oral Given 9/7/24 0817)   acetaminophen (TYLENOL) tablet 650 mg (has no administration in time  range)   guaiFENesin (MUCINEX) 12 hr tablet 600 mg (600 mg Oral Given 9/7/24 0816)   enoxaparin (LOVENOX) subcutaneous injection 40 mg (40 mg Subcutaneous Given 9/7/24 0816)   insulin lispro (HumALOG/ADMELOG) 100 units/mL subcutaneous injection 1-5 Units (0 Units Subcutaneous Hold 9/7/24 0731)   cefTRIAXone (ROCEPHIN) 1,000 mg in dextrose 5 % 50 mL IVPB (1,000 mg Intravenous New Bag 9/6/24 2146)   ipratropium (ATROVENT) 0.02 % inhalation solution 0.5 mg (0.5 mg Nebulization Given 9/7/24 1138)   budesonide (PULMICORT) inhalation solution 0.5 mg (0.5 mg Nebulization Given 9/7/24 0708)   formoterol (PERFOROMIST) nebulizer solution 20 mcg (20 mcg Nebulization Given 9/7/24 0708)   albuterol (FOR EMS ONLY) (2.5 mg/3 mL) 0.083 % inhalation solution 2.5 mg (0 mg Does not apply Given to EMS 9/3/24 2118)   cefepime (MAXIPIME) 2 g/50 mL dextrose IVPB (0 mg Intravenous Stopped 9/3/24 2204)   doxycycline (VIBRAMYCIN) 100 mg in sodium chloride 0.9 % 100 mL IVPB (0 mg Intravenous Stopped 9/3/24 2323)   albuterol inhalation solution 10 mg (10 mg Nebulization Given 9/3/24 2116)   ipratropium (ATROVENT) 0.02 % inhalation solution 1 mg (1 mg Nebulization Given 9/3/24 2116)   sodium chloride 0.9 % inhalation solution 12 mL (12 mL Nebulization Given 9/3/24 2116)   magnesium sulfate 2 g/50 mL IVPB (premix) 2 g (0 g Intravenous Stopped 9/3/24 2225)   methylPREDNISolone sodium succinate (Solu-MEDROL) injection 125 mg (125 mg Intravenous Given 9/3/24 2115)   acetaminophen (Ofirmev) injection 1,000 mg (0 mg Intravenous Stopped 9/3/24 2124)   albuterol inhalation solution 10 mg (10 mg Nebulization Given 9/3/24 2309)   ipratropium (ATROVENT) 0.02 % inhalation solution 1 mg (1 mg Nebulization Given 9/3/24 2309)   sodium chloride 0.9 % inhalation solution 12 mL (12 mL Nebulization Given 9/3/24 2310)       Diagnostic Studies  Results Reviewed       Procedure Component Value Units Date/Time    Blood culture #1 [[id number]] Collected: 09/03/24 212     Lab Status: Preliminary result Specimen: Blood from Arm, Left Updated: 09/06/24 2301     Blood Culture No Growth at 72 hrs.    Blood culture #2 [[id number]] Collected: 09/03/24 2113    Lab Status: Preliminary result Specimen: Blood from Arm, Right Updated: 09/06/24 2301     Blood Culture No Growth at 72 hrs.    Strep Pneumoniae, Urine [[id number]]  (Normal) Collected: 09/03/24 2344    Lab Status: Final result Specimen: Urine, Clean Catch Updated: 09/04/24 0704     Strep pneumoniae antigen, urine Negative    Legionella antigen, urine [[id number]]  (Normal) Collected: 09/03/24 2344    Lab Status: Final result Specimen: Urine, Clean Catch Updated: 09/04/24 0704     Legionella Urinary Antigen Negative    Blood gas, venous [[id number]]  (Abnormal) Collected: 09/04/24 0311    Lab Status: Final result Specimen: Blood from Arm, Left Updated: 09/04/24 0328     pH, Nabeel 7.163     pCO2, Nabeel 134.8 mm Hg      pO2, Nabeel 105.3 mm Hg      HCO3, Nabeel 47.3 mmol/L      Base Excess, Nabeel 13.7 mmol/L      O2 Content, Nabeel 16.8 ml/dL      O2 HGB, VENOUS 96.3 %     Blood gas, venous [[id number]]  (Abnormal) Collected: 09/04/24 0017    Lab Status: Final result Specimen: Blood from Arm, Left Updated: 09/04/24 0047     pH, Nabeel 7.319     pCO2, Nabeel 80.4 mm Hg      pO2, Nabeel 92.1 mm Hg      HCO3, Nabeel 40.4 mmol/L      Base Excess, Nabeel 11.2 mmol/L      O2 Content, Nabeel 16.4 ml/dL      O2 HGB, VENOUS 94.2 %     Urine Microscopic [[id number]]  (Abnormal) Collected: 09/03/24 2344    Lab Status: Final result Specimen: Urine, Clean Catch Updated: 09/04/24 0011     RBC, UA 1-2 /hpf      WBC, UA 1-2 /hpf      Epithelial Cells Occasional /hpf      Bacteria, UA None Seen /hpf      MUCUS THREADS Occasional    UA w Reflex to Microscopic w Reflex to Culture [[id number]]  (Abnormal) Collected: 09/03/24 2344    Lab Status: Final result Specimen: Urine, Clean Catch Updated: 09/03/24 2357     Color, UA Yellow     Clarity, UA Clear     Specific Gravity, UA 1.033     pH, UA  6.0     Leukocytes, UA Negative     Nitrite, UA Negative     Protein, UA 30 (1+) mg/dl      Glucose, UA Negative mg/dl      Ketones, UA Negative mg/dl      Urobilinogen, UA <2.0 mg/dl      Bilirubin, UA Negative     Occult Blood, UA Negative    HS Troponin I 2hr [[id number]]  (Normal) Collected: 09/03/24 2317    Lab Status: Final result Specimen: Blood from Hand, Left Updated: 09/03/24 2350     hs TnI 2hr 15 ng/L      Delta 2hr hsTnI 0 ng/L     FLU/RSV/COVID - if FLU/RSV clinically relevant [[id number]]  (Normal) Collected: 09/03/24 2113    Lab Status: Final result Specimen: Nares from Nose Updated: 09/03/24 2323     SARS-CoV-2 Negative     INFLUENZA A PCR Negative     INFLUENZA B PCR Negative     RSV PCR Negative    Narrative:      This test has been performed using the CoV-2/Flu/RSV plus assay on the [employer] platform. This test has been validated by the  and verified by the performing laboratory.     This test is designed to amplify and detect the following: nucleocapsid (N), envelope (E), and RNA-dependent RNA polymerase (RdRP) genes of the SARS-CoV-2 genome; matrix (M), basic polymerase (PB2), and acidic protein (PA) segments of the influenza A genome; matrix (M) and non-structural protein (NS) segments of the influenza B genome, and the nucleocapsid genes of RSV A and RSV B.     Positive results are indicative of the presence of Flu A, Flu B, RSV, and/or SARS-CoV-2 RNA. Positive results for SARS-CoV-2 or suspected novel influenza should be reported to state, local, or federal health departments according to local reporting requirements.      All results should be assessed in conjunction with clinical presentation and other laboratory markers for clinical management.     FOR PEDIATRIC PATIENTS - copy/paste COVID Guidelines URL to browser: [email or web address]       Sputum culture and Gram stain [[id number]]     Lab Status: No result  Specimen: Sputum     Blood gas, venous [[id number]]  (Abnormal) Collected: 09/03/24 2239    Lab Status: Final result Specimen: Blood from Arm, Left Updated: 09/03/24 2304     pH, Nabeel 7.300     pCO2, Nabeel 92.8 mm Hg      pO2, Nabeel 46.9 mm Hg      HCO3, Nabeel 44.6 mmol/L      Base Excess, Nabeel 14.5 mmol/L      O2 Content, Nabeel 13.4 ml/dL      O2 HGB, VENOUS 80.4 %     Procalcitonin [[id number]]  (Normal) Collected: 09/03/24 2114    Lab Status: Final result Specimen: Blood from Arm, Left Updated: 09/03/24 2223     Procalcitonin 0.08 ng/ml     HS Troponin 0hr (reflex protocol) [[id number]]  (Normal) Collected: 09/03/24 2114    Lab Status: Final result Specimen: Blood from Arm, Left Updated: 09/03/24 2222     hs TnI 0hr 15 ng/L     B-Type Natriuretic Peptide(BNP) [[id number]]  (Normal) Collected: 09/03/24 2114    Lab Status: Final result Specimen: Blood from Arm, Left Updated: 09/03/24 2219     BNP 36 pg/mL     Blood gas, Venous [[id number]]  (Abnormal) Collected: 09/03/24 2114    Lab Status: Final result Specimen: Blood from Arm, Left Updated: 09/03/24 2219     pH, Nabeel 7.362     pCO2, Nabeel 73.7 mm Hg      pO2, Nabeel 49.8 mm Hg      HCO3, Nabeel 40.9 mmol/L      Base Excess, Nabeel 12.5 mmol/L      O2 Content, Nabeel 14.4 ml/dL      O2 HGB, VENOUS 82.6 %     Comprehensive metabolic panel [[id number]]  (Abnormal) Collected: 09/03/24 2114    Lab Status: Final result Specimen: Blood from Arm, Left Updated: 09/03/24 2219     Sodium 145 mmol/L      Potassium 4.4 mmol/L      Chloride 98 mmol/L      CO2 >45 mmol/L      ANION GAP --     BUN 24 mg/dL      Creatinine 0.82 mg/dL      Glucose 168 mg/dL      Calcium 9.3 mg/dL      AST 30 U/L      ALT 24 U/L      Alkaline Phosphatase 66 U/L      Total Protein 6.8 g/dL      Albumin 3.7 g/dL      Total Bilirubin 0.41 mg/dL      eGFR 79 ml/min/1.73sq m     Narrative:      National Kid[employer] Foundation guidelines for Chronic Kidney Disease (CKD):     Stage 1 with normal or high GFR (GFR > 90 mL/min/1.73  square meters)    Stage 2 Mild CKD (GFR = 60-89 mL/min/1.73 square meters)    Stage 3A Moderate CKD (GFR = 45-59 mL/min/1.73 square meters)    Stage 3B Moderate CKD (GFR = 30-44 mL/min/1.73 square meters)    Stage 4 Severe CKD (GFR = 15-29 mL/min/1.73 square meters)    Stage 5 End Stage CKD (GFR <15 mL/min/1.73 square meters)  Note: GFR calculation is accurate only with a steady state creatinine    Lactic acid [[id number]]  (Normal) Collected: 09/03/24 2114    Lab Status: Final result Specimen: Blood from Arm, Left Updated: 09/03/24 2218     LACTIC ACID 1.3 mmol/L     Narrative:      Result may be elevated if tourniquet was used during collection.    Protime-INR [[id number]]  (Normal) Collected: 09/03/24 2114    Lab Status: Final result Specimen: Blood from Arm, Left Updated: 09/03/24 2208     Protime 13.7 seconds      INR 1.03    Narrative:      INR Therapeutic Range    Indication                                             INR Range      Atrial Fibrillation                                               2.0-3.0  Hypercoagulable State                                    2.0.2.3  Left Ventricular Asist Device                            2.0-3.0  Mechanical Heart Valve                                  -    Aortic(with afib, MI, embolism, HF, LA enlargement,    and/or coagulopathy)                                     2.0-3.0 (2.5-3.5)     Mitral                                                             2.5-3.5  Prosthetic/Bioprosthetic Heart Valve               2.0-3.0  Venous thromboembolism (VTE: VT, PE        2.0-3.0    APTT [[id number]]  (Normal) Collected: 09/03/24 2114    Lab Status: Final result Specimen: Blood from Arm, Left Updated: 09/03/24 2208     PTT 23 seconds     CBC and differential [[id number]]  (Abnormal) Collected: 09/03/24 2114    Lab Status: Final result Specimen: Blood from Arm, Left Updated: 09/03/24 2159     WBC 9.67 Thousand/uL      RBC 3.47 Million/uL      Hemoglobin 11.0 g/dL      Hematocrit 39.0  %       fL      MCH 31.7 pg      MCHC 28.2 g/dL      RDW 13.2 %      MPV 10.1 fL      Platelets 125 Thousands/uL      nRBC 0 /100 WBCs      Segmented % 72 %      Immature Grans % 0 %      Lymphocytes % 10 %      Monocytes % 14 %      Eosinophils Relative 4 %      Basophils Relative 0 %      Absolute Neutrophils 6.98 Thousands/µL      Absolute Immature Grans 0.04 Thousand/uL      Absolute Lymphocytes 0.96 Thousands/µL      Absolute Monocytes 1.31 Thousand/µL      Eosinophils Absolute 0.36 Thousand/µL      Basophils Absolute 0.02 Thousands/µL                    XR chest portable   Final Result by Rico Aquino MD (09/06 1248)      Increased density of right lung base infiltrate suggesting worsening pneumonia. Questionable trace left effusion.            Workstation performed: [id number]         XR chest 1 view portable   ED Interpretation by Vaibhav Monet DO (09/03 2205)   Abnormal   Right lower lobe infiltrate      Final Result by Kelsy Rivera MD (09/04 0947)      Moderate opacity in the right base, question persistent/recurrent pneumonia as that is the site of pneumonia on the chest CT from 8/8/2024 versus postinfectious scar.      Emphysema.            Workstation performed: [id number]                    Procedures  CriticalCare Time    Date/Time: 9/3/2024 8:55 PM    Performed by: Vaibhav Monet DO  Authorized by: Vaibhav Monet DO    Critical care provider statement:     Critical care time (minutes):  32    Critical care time was exclusive of:  Separately billable procedures and treating other patients    Critical care was necessary to treat or prevent imminent or life-threatening deterioration of the following conditions:  Respiratory failure    Critical care was time spent personally by me on the following activities:  Discussions with consultants, evaluation of patient's response to treatment, examination of patient, ordering and performing treatments and  interventions, ordering and review of laboratory studies, ordering and review of radiographic studies, re-evaluation of patient's condition and obtaining history from patient or surrogate    I assumed direction of critical care for this patient from another provider in my specialty: no    Comments:      Acute hypoxic and hypercarbic respiratory failure necessitating BiPAP administration.           ED Course  ED Course as of 09/07/24 1202   Tue Sep 03, 2024 2105 Procedure Note: EKG  Date/Time: 09/03/24 9:06 PM   Interpreted by: Vaibhav Monet   Indications / Diagnosis: SOB  ECG reviewed by me, the ED Provider: yes   The EKG demonstrates:  Rhythm: sinus tachycardia rate 104bpm  Intervals: normal intervals  Axis: normal axis  QRS/Blocks: normal QRS  ST Changes: Nonspecific ST changes   2126 SpO2(!)(S): 80 %  Patient placed on BiPAP   2221 pCO2, Nabeel(!!): 73.7  On bipap   2306 pCO2, Nabeel(!!): 92.8  Will give another nebulizer.  Reached out to critical care.   2318 Respiratory increased respiratory rate to 18 and IPAP to 16 on ventilator secondary to worsening hypercarbia.  Critical care to see patient.   2334 Critical care recommending a trial without BiPAP because they feel as if the patient is not getting adequate volumes on BiPAP.  Recommending repeat VBG 30 minutes off BiPAP.                            Initial Sepsis Screening       Row Name 09/03/24 2108                Is the patient's history suggestive of a new or worsening infection? Yes (Proceed)  -GJ        Suspected source of infection pneumonia  -GJ        Indicate SIRS criteria Tachycardia > 90 bpm;Tachypnea > 20 resp per min  -GJ        Are two or more of the above signs & symptoms of infection both present and new to the patient? Yes (Proceed)  -GJ        Assess for evidence of organ dysfunction: Are any of the below criteria present within 6 hours of suspected infection and SIRS criteria that are NOT considered to be chronic conditions? --                   User Key  (r) = Recorded By, (t) = Taken By, (c) = Cosigned By      Initials Name Provider Type    SENA Monet DO Physician                    SBIRT 22yo+      Flowsheet Row Most Recent Value   Initial Alcohol Screen: US AUDIT-C     1. How often do you have a drink containing alcohol? 0 Filed at: 09/04/2024 0010   2. How many drinks containing alcohol do you have on a typical day you are drinking?  0 Filed at: 09/04/2024 0010   3b. FEMALE Any Age, or MALE 65+: How often do you have 4 or more drinks on one occassion? 0 Filed at: 09/04/2024 0010   Audit-C Score 0 Filed at: 09/04/2024 0010   ALAN: How many times in the past year have you...    Used an illegal drug or used a prescription medication for non-medical reasons? Never Filed at: 09/04/2024 0010                      Medical Decision Making  Patient with history as above presented with shortness of breath. History obtained from EMS.    Differential diagnosis includes: COPD with acute exacerbation, acute hypoxic and hypercarbic respiratory failure, acute encephalopathy likely secondary to hypercarbia, at risk for sepsis    Plan: Sepsis labs, VBG, troponin, BNP, ECG, chest x-ray, cefepime, doxycycline, heart neb, magnesium    ECG independently interpreted by myself as above. Labs reviewed and remarkable for an elevated pCO2, likely cause of the patient's encephalopathy. Independently reviewed imaging consistent with right-sided infiltrate.  This along with the patient's COPD likely contributing to his respiratory failure.  Repeat VBG with worsening hypercarbia.  Initially discussed with respiratory therapy and increased respiratory rate as well as IPAP.  Reached out to critical care as outlined in ED course who recommended trial off of BiPAP secondary to the thought that patient was having inadequate volumes with the BiPAP administration.  Repeated VBG 30 minutes later with improvements.  Critical care deemed that patient was stable for stepdown  2. Discussed patient's management with medicine who agreed to admit patient under their service.    Amount and/or Complexity of Data Reviewed  Labs: ordered. Decision-making details documented in ED Course.  Radiology: ordered and independent interpretation performed.    Risk  Prescription drug management.  Decision regarding hospitalization.                 Disposition  Final diagnoses:   COPD with acute exacerbation (HCC)   Acute hypoxic respiratory failure (HCC)     Time reflects when diagnosis was documented in both MDM as applicable and the Disposition within this note       Time User Action Codes Description Comment    9/3/2024 11:20 PM Vaibhav Monet Add [J96.01] Acute hypoxic respiratory failure (HCC)     9/3/2024 11:21 PM Vaibhav Monet Add [J44.1] COPD with acute exacerbation (HCC)     9/3/2024 11:21 PM Vaibhav Monet Modify [J44.1] COPD with acute exacerbation (HCC)     9/3/2024 11:21 PM Vaibhav Monet Remove [J96.01] Acute hypoxic respiratory failure (HCC)     9/3/2024 11:22 PM Vaibhav Monet Add [J96.01] Acute hypoxic respiratory failure (HCC)           ED Disposition       ED Disposition   Admit    Condition   Stable    Date/Time   Wed Sep 4, 2024 0126    Comment   Case was discussed with MONICO and the patient's admission status was agreed to be Admission Status: inpatient status to the service of Dr. Mendoza .               Follow-up Information       Follow up With Specialties Details Why Contact Info    [facility] Follow up  [address]  [address]  [facility] [address]  [phone]              Current Discharge Medication List        CONTINUE these medications which have NOT CHANGED    Details   acetaminophen (TYLENOL) 650 mg CR tablet TAKE 1 TABLET (650 MG TOTAL) BY MOUTH EVERY 8 (EIGHT) HOURS AS NEEDED FOR MILD PAIN  Qty: 90 tablet, Refills: 5    Associated Diagnoses: Chronic bilateral low back pain without sciatica      albuterol (PROVENTIL  HFA,VENTOLIN HFA) 90 mcg/act inhaler INHALE 2 PUFFS EVERY 6 (SIX) HOURS AS NEEDED FOR WHEEZINGINHALE 2 PUFFS CADA 6 (SIX) HOURS AS NEEDED PARA WHEEZING  Qty: 18 g, Refills: 1    Comments: Substitution to a formulary equivalent within the same pharmaceutical class is authorized.  Associated Diagnoses: Chronic respiratory failure with hypoxia and hypercapnia (HCC)      budesonide (Pulmicort) 0.5 mg/2 mL nebulizer solution Take 2 mL (0.5 mg total) by nebulization 2 (two) times a day Rinse mouth after use.  Qty: 360 mL, Refills: 3    Associated Diagnoses: Chronic obstructive pulmonary disease, unspecified COPD type ([facility])      formoterol (PERFOROMIST) 20 MCG/2ML nebulizer solution Take 2 mL (20 mcg total) by nebulization 2 (two) times a day  Qty: 360 mL, Refills: 3    Associated Diagnoses: Chronic obstructive pulmonary disease, unspecified COPD type ([facility])      ipratropium-albuterol (DUO-NEB) 0.5-2.5 mg/3 mL nebulizer solution Take 3 mL by nebulization 2 (two) times a day  Qty: 540 mL, Refills: 3    Associated Diagnoses: Chronic obstructive pulmonary disease, unspecified COPD type (HCC)      losartan (COZAAR) 25 mg tablet TAKE ONE TABLET BY MOUTH TWICE DAILYTOMAR 1 TABLETA POR VIA ORAL DOS VECES AL EULALIO  Qty: 180 tablet, Refills: 0    Associated Diagnoses: Benign essential hypertension      pantoprazole (PROTONIX) 40 mg tablet TAKE 1 TABLET (40 MG TOTAL) BY MOUTH DAILY  Qty: 30 tablet, Refills: 5    Associated Diagnoses: Long term (current) use of systemic steroids      Senna Plus 8.6-50 MG per tablet TAKE 1 TABLET BY MOUTH DAILY  Qty: 30 tablet, Refills: 5    Associated Diagnoses: Chronic idiopathic constipation      torsemide (DEMADEX) 5 MG tablet Take 1 tablet (5 mg total) by mouth daily  Qty: 30 tablet, Refills: 5    Associated Diagnoses: Acute on chronic congestive heart failure, unspecified heart failure type (HCC)      ketoconazole (NIZORAL) 2 % shampoo Apply 1 Application topically 2 (two) times a week  Qty: 120  mL, Refills: 5    Associated Diagnoses: Seborrheic dermatitis      mometasone (ELOCON) 0.1 % lotion Apply topically daily  Qty: 60 mL, Refills: 5    Associated Diagnoses: Seborrheic dermatitis             No discharge procedures on file.    PDMP Review         Value Time User    PDMP Reviewed  Yes 8/8/2024 11:40 PM RAQUEL Guerrero            ED Provider  Electronically Signed by             Vaibhav Monet, DO  09/04/24 0522       Vaibhav Monet, DO  09/07/24 1202

## 2024-09-04 NOTE — ED NOTES
Per Nas voss pt to be trialed off bipap for 30  minutes and then a repeat VBG to be redrawn. RT at bedside to take pt off     Khadijah Cherry RN  09/03/24 7593

## 2024-09-04 NOTE — ASSESSMENT & PLAN NOTE
Patient with PMHx of frequent COPD exacerbations secondary to severe COPD, chronic respiratory failure typically maintained on 3 L nasal cannula, HTN, and CHF presented to the ED secondary to increased SOB, wheezing, and confusion  Pt initially presented requiring the BiPAP, however able to be weened to midflow NC at the bedside by CC  Pt evaluated by critical care who deemed patient appropriate for SD2  CXR similar to previous, possible RLL v. chronic scarring/atelectasis demonstrated on previous imaging, official read pending   pCO2 73.7->92.8->80.4  Continue to monitor VBG closely  Continue BiPAP hs and prn  Suspect acute resp failure most likely secondary to COPD exacerbation  Resp protocol  Ween oxygen as tolerated  Pulm consult pending  Rest of plan as below. See COPD and SIRS

## 2024-09-04 NOTE — ASSESSMENT & PLAN NOTE
Wt Readings from Last 3 Encounters:   08/13/24 58.1 kg (128 lb)   07/10/24 59 kg (130 lb)   06/27/24 55.1 kg (121 lb 7.6 oz)     Pt appears euvolemic on exam  BNP 36  Last ECHO 4/2023 EF 66% grade I diastolic dysfunction  Daily weights, I/O  Continue home torsemide

## 2024-09-04 NOTE — RESPIRATORY THERAPY NOTE
Called for hour long heart neb for pt. Upon arrival, diffculty obtaining spo2 on extremities (pt cold). Was able to obtain saturation on ear. 93% on RA. Provider arrived at bedside, wanted bipap for hypercarbia, due to pt history. Previous VBG 7.40/69.3/86 pt is compensated and has baseline of increased PaCO2. Placed pt on bipap 12/6, 16,50% per provider request.  Will continue to monitor pt.

## 2024-09-04 NOTE — ASSESSMENT & PLAN NOTE
Pt presented to the ED with shortness of breath and wheezing, improved somewhat following therapies  CXR similar to previous, possible RLL v. chronic scarring/atelectasis demonstrated on previous imaging, official read pending   Suspect COPD exacerbation in the setting of GOLD E COPD  Scheduled nebs  IV solumedrol   Continue home inhaler regimen  Pulm consult pending

## 2024-09-04 NOTE — RESPIRATORY THERAPY NOTE
RT Ventilator Management Note  Severiano Feliciano 89 y.o. male MRN: [id number]  Unit/Bed#: ICU 06 Encounter: [id number]      Daily Screen    No data found in the last 10 encounters.          09/04/24 0404   Respiratory Assessment   Assessment Type During-treatment   General Appearance Drowsy   Respiratory Pattern Assisted;Spontaneous   Chest Assessment Chest expansion symmetrical   Bilateral Breath Sounds Diminished;Inspiratory wheezes   Resp Comments transferred to ICU applied bipap, prn treatment given   Non-Invasive Information   O2 Interface Device Full face mask   Non-Invasive Ventilation Mode BiPAP   $ Continous NIV Subsequent   $ Intermittent NIV Yes   SpO2 93 %   $ Pulse Oximetry Spot Check Charge Completed   Non-Invasive Settings   IPAP (cm) 22 cm   EPAP (cm) 12 cm   Rate (Set) 22   FiO2 (%) 40   Pressure Support (cm H2O) 10   Rise Time 2   Inspiratory Time (Set) 0.8   Non-Invasive Readings   Skin Intervention Skin barrier applied;Skin intact   Total Rate 22   MV (Mech) 7.9   Peak Pressure (Obs) 22   Spontaneous Vt (mL) 319   I/E Ratio (Obs) 1:2.4   Leak (lpm) 0   Non-Invasive Alarms   Insp Pressure High (cm H20) 50   Insp Pressure Low (cm H20) 5   Low Insp Pressure Time (sec) 20 sec   MV Low (L/min) 2   Vt High (mL) 1500   Vt Low (mL) 150   High Resp Rate (BPM) 50 BPM   Low Resp Rate (BPM) 5 BPM         Physical Exam:   Assessment Type: During-treatment  General Appearance: Drowsy  Respiratory Pattern: Assisted, Spontaneous  Chest Assessment: Chest expansion symmetrical  Bilateral Breath Sounds: Diminished, Inspiratory wheezes      Resp Comments: transferred to ICU applied bipap, prn treatment given

## 2024-09-04 NOTE — ED NOTES
Pt dropped to 85% on room air, Nas voss at bedside placed pt on 10L nasal cannula pt now at 92%     Khadijah Cherry RN  09/03/24 5732

## 2024-09-05 LAB
BASE EXCESS BLDA CALC-SCNC: 20 MMOL/L (ref -2–3)
BASOPHILS # BLD AUTO: 0 THOUSANDS/ÂΜL (ref 0–0.1)
BASOPHILS NFR BLD AUTO: 0 % (ref 0–1)
BUN SERPL-MCNC: 38 MG/DL (ref 5–25)
CA-I BLD-SCNC: 1.21 MMOL/L (ref 1.12–1.32)
CALCIUM SERPL-MCNC: 9.2 MG/DL (ref 8.4–10.2)
CHLORIDE SERPL-SCNC: 98 MMOL/L (ref 96–108)
CO2 SERPL-SCNC: >45 MMOL/L (ref 21–32)
CREAT SERPL-MCNC: 1.07 MG/DL (ref 0.6–1.3)
EOSINOPHIL # BLD AUTO: 0 THOUSAND/ÂΜL (ref 0–0.61)
EOSINOPHIL NFR BLD AUTO: 0 % (ref 0–6)
ERYTHROCYTE [DISTWIDTH] IN BLOOD BY AUTOMATED COUNT: 13 % (ref 11.6–15.1)
EST. AVERAGE GLUCOSE BLD GHB EST-MCNC: 131 MG/DL
GFR SERPL CREATININE-BSD FRML MDRD: 62 ML/MIN/1.73SQ M
GLUCOSE SERPL-MCNC: 128 MG/DL (ref 65–140)
GLUCOSE SERPL-MCNC: 141 MG/DL (ref 65–140)
GLUCOSE SERPL-MCNC: 152 MG/DL (ref 65–140)
GLUCOSE SERPL-MCNC: 175 MG/DL (ref 65–140)
GLUCOSE SERPL-MCNC: 215 MG/DL (ref 65–140)
GLUCOSE SERPL-MCNC: 230 MG/DL (ref 65–140)
HBA1C MFR BLD: 6.2 %
HCO3 BLDA-SCNC: 47 MMOL/L (ref 22–28)
HCT VFR BLD AUTO: 33.9 % (ref 36.5–49.3)
HCT VFR BLD CALC: 33 % (ref 36.5–49.3)
HGB BLD-MCNC: 9.7 G/DL (ref 12–17)
HGB BLDA-MCNC: 11.2 G/DL (ref 12–17)
IMM GRANULOCYTES # BLD AUTO: 0.07 THOUSAND/UL (ref 0–0.2)
IMM GRANULOCYTES NFR BLD AUTO: 1 % (ref 0–2)
LYMPHOCYTES # BLD AUTO: 0.4 THOUSANDS/ÂΜL (ref 0.6–4.47)
LYMPHOCYTES NFR BLD AUTO: 5 % (ref 14–44)
MCH RBC QN AUTO: 31.7 PG (ref 26.8–34.3)
MCHC RBC AUTO-ENTMCNC: 28.6 G/DL (ref 31.4–37.4)
MCV RBC AUTO: 111 FL (ref 82–98)
MONOCYTES # BLD AUTO: 0.61 THOUSAND/ÂΜL (ref 0.17–1.22)
MONOCYTES NFR BLD AUTO: 7 % (ref 4–12)
MRSA NOSE QL CULT: NORMAL
NEUTROPHILS # BLD AUTO: 7.31 THOUSANDS/ÂΜL (ref 1.85–7.62)
NEUTS SEG NFR BLD AUTO: 87 % (ref 43–75)
NRBC BLD AUTO-RTO: 0 /100 WBCS
PCO2 BLD: 68.1 MM HG (ref 36–44)
PCO2 BLD: >45 MMOL/L (ref 21–32)
PH BLD: 7.45 [PH] (ref 7.35–7.45)
PLATELET # BLD AUTO: 117 THOUSANDS/UL (ref 149–390)
PMV BLD AUTO: 10 FL (ref 8.9–12.7)
PO2 BLD: 70 MM HG (ref 75–129)
POTASSIUM BLD-SCNC: 3.7 MMOL/L (ref 3.5–5.3)
POTASSIUM SERPL-SCNC: 5 MMOL/L (ref 3.5–5.3)
RBC # BLD AUTO: 3.06 MILLION/UL (ref 3.88–5.62)
SAO2 % BLD FROM PO2: 93 % (ref 60–85)
SODIUM BLD-SCNC: 138 MMOL/L (ref 136–145)
SODIUM SERPL-SCNC: 144 MMOL/L (ref 135–147)
SPECIMEN SOURCE: ABNORMAL
WBC # BLD AUTO: 8.39 THOUSAND/UL (ref 4.31–10.16)

## 2024-09-05 PROCEDURE — 82330 ASSAY OF CALCIUM: CPT

## 2024-09-05 PROCEDURE — 85025 COMPLETE CBC W/AUTO DIFF WBC: CPT | Performed by: INTERNAL MEDICINE

## 2024-09-05 PROCEDURE — 83036 HEMOGLOBIN GLYCOSYLATED A1C: CPT | Performed by: INTERNAL MEDICINE

## 2024-09-05 PROCEDURE — 97167 OT EVAL HIGH COMPLEX 60 MIN: CPT

## 2024-09-05 PROCEDURE — 82803 BLOOD GASES ANY COMBINATION: CPT

## 2024-09-05 PROCEDURE — 84132 ASSAY OF SERUM POTASSIUM: CPT

## 2024-09-05 PROCEDURE — 80048 BASIC METABOLIC PNL TOTAL CA: CPT | Performed by: INTERNAL MEDICINE

## 2024-09-05 PROCEDURE — 82947 ASSAY GLUCOSE BLOOD QUANT: CPT

## 2024-09-05 PROCEDURE — 84295 ASSAY OF SERUM SODIUM: CPT

## 2024-09-05 PROCEDURE — 94640 AIRWAY INHALATION TREATMENT: CPT

## 2024-09-05 PROCEDURE — 36600 WITHDRAWAL OF ARTERIAL BLOOD: CPT

## 2024-09-05 PROCEDURE — 82948 REAGENT STRIP/BLOOD GLUCOSE: CPT

## 2024-09-05 PROCEDURE — 99232 SBSQ HOSP IP/OBS MODERATE 35: CPT | Performed by: INTERNAL MEDICINE

## 2024-09-05 PROCEDURE — 94760 N-INVAS EAR/PLS OXIMETRY 1: CPT

## 2024-09-05 PROCEDURE — 85014 HEMATOCRIT: CPT

## 2024-09-05 RX ORDER — BUDESONIDE 0.5 MG/2ML
0.5 INHALANT ORAL
Status: DISCONTINUED | OUTPATIENT
Start: 2024-09-06 | End: 2024-09-07 | Stop reason: HOSPADM

## 2024-09-05 RX ORDER — INSULIN LISPRO 100 [IU]/ML
1-5 INJECTION, SOLUTION INTRAVENOUS; SUBCUTANEOUS
Status: DISCONTINUED | OUTPATIENT
Start: 2024-09-05 | End: 2024-09-07 | Stop reason: HOSPADM

## 2024-09-05 RX ORDER — LEVALBUTEROL INHALATION SOLUTION 1.25 MG/3ML
1.25 SOLUTION RESPIRATORY (INHALATION)
Status: DISCONTINUED | OUTPATIENT
Start: 2024-09-05 | End: 2024-09-05

## 2024-09-05 RX ORDER — INSULIN LISPRO 100 [IU]/ML
1-5 INJECTION, SOLUTION INTRAVENOUS; SUBCUTANEOUS
Status: DISCONTINUED | OUTPATIENT
Start: 2024-09-05 | End: 2024-09-05

## 2024-09-05 RX ORDER — FORMOTEROL FUMARATE 20 UG/2ML
20 SOLUTION RESPIRATORY (INHALATION)
Status: DISCONTINUED | OUTPATIENT
Start: 2024-09-06 | End: 2024-09-07 | Stop reason: HOSPADM

## 2024-09-05 RX ORDER — DIVALPROEX SODIUM 250 MG/1
250 TABLET, DELAYED RELEASE ORAL EVERY 8 HOURS PRN
Status: DISCONTINUED | OUTPATIENT
Start: 2024-09-05 | End: 2024-09-06

## 2024-09-05 RX ADMIN — FORMOTEROL FUMARATE 20 MCG: 20 SOLUTION RESPIRATORY (INHALATION) at 07:34

## 2024-09-05 RX ADMIN — LOSARTAN POTASSIUM 25 MG: 25 TABLET, FILM COATED ORAL at 09:11

## 2024-09-05 RX ADMIN — PANTOPRAZOLE SODIUM 40 MG: 40 TABLET, DELAYED RELEASE ORAL at 05:35

## 2024-09-05 RX ADMIN — TORSEMIDE 5 MG: 10 TABLET ORAL at 09:11

## 2024-09-05 RX ADMIN — INSULIN LISPRO 2 UNITS: 100 INJECTION, SOLUTION INTRAVENOUS; SUBCUTANEOUS at 12:00

## 2024-09-05 RX ADMIN — DEXTROSE 1000 MG: 50 INJECTION, SOLUTION INTRAVENOUS at 21:50

## 2024-09-05 RX ADMIN — DIVALPROEX SODIUM 250 MG: 250 TABLET, DELAYED RELEASE ORAL at 16:13

## 2024-09-05 RX ADMIN — IPRATROPIUM BROMIDE 0.5 MG: 0.5 SOLUTION RESPIRATORY (INHALATION) at 07:34

## 2024-09-05 RX ADMIN — BUDESONIDE 0.5 MG: 0.5 INHALANT RESPIRATORY (INHALATION) at 07:34

## 2024-09-05 RX ADMIN — SENNOSIDES AND DOCUSATE SODIUM 1 TABLET: 50; 8.6 TABLET ORAL at 09:11

## 2024-09-05 RX ADMIN — IPRATROPIUM BROMIDE 0.5 MG: 0.5 SOLUTION RESPIRATORY (INHALATION) at 14:02

## 2024-09-05 RX ADMIN — METHYLPREDNISOLONE SODIUM SUCCINATE 40 MG: 40 INJECTION, POWDER, FOR SOLUTION INTRAMUSCULAR; INTRAVENOUS at 04:25

## 2024-09-05 RX ADMIN — GUAIFENESIN 600 MG: 600 TABLET, EXTENDED RELEASE ORAL at 09:11

## 2024-09-05 RX ADMIN — CEFEPIME 2000 MG: 2 INJECTION, POWDER, FOR SOLUTION INTRAVENOUS at 09:11

## 2024-09-05 RX ADMIN — ENOXAPARIN SODIUM 40 MG: 40 INJECTION SUBCUTANEOUS at 09:12

## 2024-09-05 NOTE — PLAN OF CARE
Problem: CONFUSION/THOUGHT DISTURBANCE  Goal: Thought disturbances (confusion, delirium, depression, dementia or psychosis) are managed to maintain or return to baseline mental status and functional level  Description: INTERVENTIONS:  - Assess for possible contributors to  thought disturbance, including but not limited to medications, infection, impaired vision or hearing, underlying metabolic abnormalities, dehydration, respiratory compromise,  psychiatric diagnoses and notify attending PHYSICAN/AP  - Monitor and intervene to maintain adequate nutrition, hydration, elimination, sleep and activity  - Decrease environmental stimuli, including noise as appropriate.  - Provide frequent contacts to provide refocusing, direction and reassurance as needed. Approach patient calmly with eye contact and at their level.  - [place] high risk fall precautions, aspiration precautions and other safety measures, as indicated  - If delirium suspected, notify physician/AP of change in condition and request immediate in-person evaluation  - Pursue consults as appropriate including Geriatric (campus dependent), OT for cognitive evaluation/activity planning, psychiatric, pastoral care, etc.  Outcome: Progressing      101

## 2024-09-05 NOTE — PLAN OF CARE
Problem: PAIN - ADULT  Goal: Verbalizes/displays adequate comfort level or baseline comfort level  Description: Interventions:  - Encourage patient to monitor pain and request assistance  - Assess pain using appropriate pain scale  - Administer analgesics based on type and severity of pain and evaluate response  - Implement non-pharmacological measures as appropriate and evaluate response  - Consider cultural and social influences on pain and pain management  - Notify physician/advanced practitioner if interventions unsuccessful or patient reports new pain  Outcome: Progressing     Problem: INFECTION - ADULT  Goal: Absence or prevention of progression during hospitalization  Description: INTERVENTIONS:  - Assess and monitor for signs and symptoms of infection  - Monitor lab/diagnostic results  - Monitor all insertion sites, i.e. indwelling lines, tubes, and drains  - Monitor endotracheal if appropriate and nasal secretions for changes in amount and color  - [place] appropriate cooling/warming therapies per order  - Administer medications as ordered  - Instruct and encourage patient and family to use good hand hygiene technique  - Identify and instruct in appropriate isolation precautions for identified infection/condition  Outcome: Progressing  Goal: Absence of fever/infection during neutropenic period  Description: INTERVENTIONS:  - Monitor WBC    Outcome: Completed     Problem: SAFETY ADULT  Goal: Patient will remain free of falls  Description: INTERVENTIONS:  - Educate patient/family on patient safety including physical limitations  - Instruct patient to call for assistance with activity   - Consult OT/PT to assist with strengthening/mobility   - Keep Call bell within reach  - Keep bed low and locked with side rails adjusted as appropriate  - Keep care items and personal belongings within reach  - Initiate and maintain comfort rounds  - Make Fall Risk Sign visible to staff  - Offer Toileting every 2 Hours, in  advance of need  - Initiate/Maintain bed alarm  - Obtain necessary fall risk management equipment: walker  - Apply yellow socks and bracelet for high fall risk patients  - Consider moving patient to room near nurses station  Outcome: Progressing  Goal: Maintain or return to baseline ADL function  Description: INTERVENTIONS:  -  Assess patient's ability to carry out ADLs; assess patient's baseline for ADL function and identify physical deficits which impact ability to perform ADLs (bathing, care of mouth/teeth, toileting, grooming, dressing, etc.)  - Assess/evaluate cause of self-care deficits   - Assess range of motion  - Assess patient's mobility; develop plan if impaired  - Assess patient's need for assistive devices and provide as appropriate  - Encourage maximum independence but intervene and supervise when necessary  - Involve family in performance of ADLs  - Assess for home care needs following discharge   - Consider OT consult to assist with ADL evaluation and planning for discharge  - Provide patient education as appropriate  Outcome: Progressing  Goal: Maintains/Returns to pre admission functional level  Description: INTERVENTIONS:  - Perform AM-PAC 6 Click Basic Mobility/ Daily Activity assessment daily.  - Set and communicate daily mobility goal to care team and patient/family/caregiver.   - Collaborate with rehabilitation services on mobility goals if consulted  - Perform Range of Motion 3 times a day.  - Reposition patient every 2 hours.  - Dangle patient 3 times a day  - Stand patient 3 times a day  - Ambulate patient 3 times a day  - Out of bed to chair 3 times a day   - Out of bed for meals 3 times a day  - Out of bed for toileting  - Record patient progress and toleration of activity level   Outcome: Progressing

## 2024-09-05 NOTE — ASSESSMENT & PLAN NOTE
Patient with PMHx of frequent COPD exacerbations secondary to severe COPD, chronic respiratory failure typically maintained on 3 L nasal cannula, HTN, and CHF presented to the ED secondary to increased SOB, wheezing, and confusion. Patient did initially require Bipap  Patient currently on 3 L oxygen which is his chronic  CXR revealed moderate opacity in right base emphysema   Suspect acute resp failure most likely secondary to COPD exacerbation  Pulmonary input appreciated  Resp protocol  Ween oxygen as tolerated

## 2024-09-05 NOTE — PROGRESS NOTES
[facility]  Progress Note  Name: Severiano Feliciano I  MRN: [id number]  Unit/Bed#: [place] [address] -01 I Date of Admission: 9/3/2024   Date of Service: 9/5/2024 I Hospital Day: 1    Assessment & Plan   * Acute on chronic respiratory failure with hypoxia and hypercapnia (HCC)  Assessment & Plan  Patient with PMHx of frequent COPD exacerbations secondary to severe COPD, chronic respiratory failure typically maintained on 3 L nasal cannula, HTN, and CHF presented to the ED secondary to increased SOB, wheezing, and confusion. Patient did initially require Bipap  Patient currently on 3 L oxygen which is his chronic  CXR revealed moderate opacity in right base emphysema   Suspect acute resp failure most likely secondary to COPD exacerbation  Pulmonary input appreciated  Resp protocol  Ween oxygen as tolerated    COPD with acute exacerbation (HCC)  Assessment & Plan  Pt presented to the ED with shortness of breath and wheezing, improved somewhat following therapies  CXR similar to previous, possible RLL v. chronic scarring/atelectasis demonstrated on previous imaging, official read pending   Suspect COPD exacerbation   Steroids were discontinued as likely causing confusion and agitation  Continue empiric antibiotics  Pulmonary input appreciated      Chronic diastolic (congestive) heart failure (HCC)  Assessment & Plan  Wt Readings from Last 3 Encounters:   09/05/24 58.9 kg (129 lb 13.6 oz)   08/13/24 58.1 kg (128 lb)   07/10/24 59 kg (130 lb)     Pt appears euvolemic on exam  BNP 36  Last ECHO 4/2023 EF 66% grade I diastolic dysfunction  Daily weights, I/O  Continue home torsemide    Acute metabolic encephalopathy  Assessment & Plan  Encephalopathy likely secondary to COPD exacerbation, pneumonia, steroids  Placed on continue observation  Frequent redirection and reorientation  Depakote as needed    Benign essential hypertension  Assessment & Plan  Continue losartan and torsemide              Mobility:  Basic Mobility Inpatient Raw Score: 17  JH-HLM Goal: 5: Stand one or more mins  JH-HLM Achieved: 6: Walk 10 steps or more  JH-HLM Goal NOT achieved. Continue with multidisciplinary rounding and encourage appropriate mobility to improve upon JH-HLM goals.    VTE Pharmacologic Prophylaxis:   Pharmacologic: lovenox    Patient Centered Rounds: I have performed bedside rounds with nursing staff today.    Education and Discussions with Family / Patient: updated spouse    Time Spent for Care:   More than 50% of total time spent on counseling and coordination of care as described above.    Current Length of Stay: 1 day(s)    Current Patient Status: Inpatient   Certification Statement: The patient will continue to require additional inpatient hospital stay due to COPD exacerbation    Discharge Plan / Estimated Discharge Date: 24-48h    Code Status: Level 1 - Full Code      Subjective:   Patient seen and examined at bedside, confused, restless, agitated    Objective:     Vitals:   Temp (24hrs), Av.2 °F (36.8 °C), Min:97.5 °F (36.4 °C), Max:98.7 °F (37.1 °C)    Temp:  [97.5 °F (36.4 °C)-98.7 °F (37.1 °C)] 97.9 °F (36.6 °C)  HR:  [80-86] 81  Resp:  [18-20] 18  BP: (112-168)/(55-83) 168/83  SpO2:  [93 %-99 %] 97 %  Body mass index is 23 kg/m².     Input and Output Summary (last 24 hours):       Intake/Output Summary (Last 24 hours) at 2024 1604  Last data filed at 2024 0536  Gross per 24 hour   Intake 840 ml   Output 100 ml   Net 740 ml       Physical Exam:    Constitutional: Patient is in no acute distress  HEENT:  Normocephalic, atraumatic  Cardiovascular: Normal S1S2, RRR, No murmurs/rubs/gallops appreciated.  Pulmonary:  Bilateral air entry, No rhonchi/rales/wheezing appreciated  Abdominal: Soft, Bowel sounds present, Non-tender, Non-distended  Extremities:  No cyanosis, clubbing or edema.   Neurological: awake, alert  Skin:  Warm, dry    Additional Data:     Labs:    Results from last 7 days    Lab Units 09/05/24  1143 09/05/24  0437   WBC Thousand/uL  --  8.39   HEMOGLOBIN g/dL  --  9.7*   I STAT HEMOGLOBIN g/dl 11.2*  --    HEMATOCRIT %  --  33.9*   HEMATOCRIT, ISTAT % 33*  --    PLATELETS Thousands/uL  --  117*   SEGS PCT %  --  87*   LYMPHO PCT %  --  5*   MONO PCT %  --  7   EOS PCT %  --  0     Results from last 7 days   Lab Units 09/05/24  1143 09/05/24  0437 09/04/24  0555 09/03/24 2114   POTASSIUM mmol/L  --  5.0   < > 4.4   CHLORIDE mmol/L  --  98   < > 98   CO2 mmol/L  --  >45*   < > >45*   CO2, I-STAT mmol/L >45*  --   --   --    BUN mg/dL  --  38*   < > 24   CREATININE mg/dL  --  1.07   < > 0.82   CALCIUM mg/dL  --  9.2   < > 9.3   ALK PHOS U/L  --   --   --  66   ALT U/L  --   --   --  24   AST U/L  --   --   --  30   GLUCOSE, ISTAT mg/dl 175*  --   --   --     < > = values in this interval not displayed.     Results from last 7 days   Lab Units 09/03/24 2114   INR  1.03        I Have Reviewed All Lab Data Listed Above.    Invasive Devices       Peripheral Intravenous Line  Duration             Peripheral IV 09/03/24 Left Antecubital 1 day    Peripheral IV 09/04/24 Dorsal (posterior);Left Hand 1 day                    Recent Cultures (last 7 days):     Results from last 7 days   Lab Units 09/03/24  2344 09/03/24 2124 09/03/24 2113   BLOOD CULTURE   --  No Growth at 24 hrs. No Growth at 24 hrs.   LEGIONELLA URINARY ANTIGEN  Negative  --   --        Last 24 Hours Medication List:   Current Facility-Administered Medications   Medication Dose Route Frequency Provider Last Rate    acetaminophen  650 mg Oral Q6H PRN Oren Leyva PA-C      budesonide  0.5 mg Nebulization BID Oren Leyva PA-C      cefTRIAXone  1,000 mg Intravenous Q24H RAQUEL Franco      divalproex sodium  250 mg Oral Q8H PRN Marija Khan MD      enoxaparin  40 mg Subcutaneous Daily Oren Leyva PA-C      formoterol  20 mcg Nebulization BID rOen Leyva PA-C      guaiFENesin  600 mg Oral BID Oren  Ashley Leyva PA-C      insulin lispro  1-5 Units Subcutaneous TID AC Marija Khan MD      ipratropium  0.5 mg Nebulization 4x Daily Marija Khan MD      losartan  25 mg Oral Daily Oren Leyva PA-C      pantoprazole  40 mg Oral Early Morning Oren Leyva PA-C      senna-docusate sodium  1 tablet Oral Daily Oren Leyva PA-C      torsemide  5 mg Oral Daily Oern Leyva PA-C          Today, Patient Was Seen By: Marija Khan MD

## 2024-09-05 NOTE — PROGRESS NOTES
"Progress Note - Pulmonary   Severiano Feliciano 89 y.o. male MRN: [id number]  Unit/Bed#: [place] [address] -01 Encounter: [id number]    Assessment/Plan:    1.  Acute hypoxic/hypercapnic on chronic hypoxic/hypercapnic respiratory failure likely multifaceted as listed below        -Currently on home O2 requirements 3 L 94%       -Continue sats greater than 88%       -Pulmonary toileting: Deep breathing cough out of bed as tolerated incentive spirometry    2.  Severe JAY        -Only wore his BiPAP last night for a few minutes        -Home compliance: Minimal use with significant air leak        -Order placed for mask fitting-nasal pillows        -If patient cannot remain compliant with BiPAP will need to discuss goals of care        -Given significant confusion ordered stat ABG    3.  Abnormal chest x-ray        -Persistent right basilar opacity        -Likely reoccurring aspiration        -Procalcitonin- 0.08--0.27, MRSA pending        -Day # 3-cefepime-transitioning to ceftriaxone    4.  Very severe COPD without acute exacerbation          -Inpatient: Budesonide twice daily, Xopenex/Atrovent 3 times daily         -will DC steroids totally for now          -Home regimen: budesonide Perforomist twice daily, DuoNeb every 6 as needed, Proventil 2 puffs every 6 as needed        -No indication for steroid therapy at this time           Subjective:    Patient was in his room extremely confused and agitated.  Patient had a difficult time following commands.  Overnight events include patient wearing BiPAP only for a couple minutes.  Currently no fevers night sweats vomiting headaches or chest pain reported.    Objective:    Vitals: Blood pressure 168/83, pulse 81, temperature 97.9 °F (36.6 °C), temperature source Oral, resp. rate 18, height 5' 3\" (1.6 m), weight 58.9 kg (129 lb 13.6 oz), SpO2 98%.,Body mass index is 23 kg/m².      Intake/Output Summary (Last 24 hours) at 9/5/2024 1055  Last data filed at 9/5/2024 0536  Gross per " "24 hour   Intake 840 ml   Output 100 ml   Net 740 ml       Invasive Devices       Peripheral Intravenous Line  Duration             Peripheral IV 09/03/24 Left Antecubital 1 day    Peripheral IV 09/04/24 Dorsal (posterior);Left Hand 1 day                    Physical Exam:     Physical Exam  Constitutional:       General: He is not in acute distress.     Appearance: Normal appearance. He is normal weight. He is not ill-appearing.   HENT:      Head: Normocephalic and atraumatic.      Nose: Nose normal. No congestion or rhinorrhea.      Mouth/Throat:      Mouth: Mucous membranes are dry.      Pharynx: Oropharynx is clear. No oropharyngeal exudate or posterior oropharyngeal erythema.   Cardiovascular:      Rate and Rhythm: Normal rate and regular rhythm.      Pulses: Normal pulses.      Heart sounds: Normal heart sounds. No murmur heard.     No friction rub. No gallop.   Pulmonary:      Effort: Pulmonary effort is normal. No respiratory distress.      Breath sounds: No stridor. No wheezing, rhonchi or rales.      Comments: Clear breath sounds  Chest:      Chest wall: No tenderness.   Abdominal:      General: Abdomen is flat. Bowel sounds are normal. There is no distension.      Palpations: Abdomen is soft. There is no mass.   Musculoskeletal:         General: No swelling or tenderness. Normal range of motion.      Cervical back: Normal range of motion. No rigidity or tenderness.   Skin:     General: Skin is warm and dry.      Coloration: Skin is not jaundiced or pale.   Neurological:      General: No focal deficit present.      Mental Status: He is alert and oriented to person, place, and time. Mental status is at baseline.         Labs: I have personally reviewed pertinent lab results., ABG: No results found for: \"PHART\", \"TXD8HMZ\", \"PO2ART\", \"MER3BTY\", \"T7GDOSSW\", \"BEART\", \"SOURCE\", BNP: No results found for: \"BNP\", CBC:   Lab Results   Component Value Date    WBC 8.39 09/05/2024    HGB 9.7 (L) 09/05/2024    HCT 33.9 " "(L) 09/05/2024     (H) 09/05/2024     (L) 09/05/2024    RBC 3.06 (L) 09/05/2024    MCH 31.7 09/05/2024    MCHC 28.6 (L) 09/05/2024    RDW 13.0 09/05/2024    MPV 10.0 09/05/2024    NRBC 0 09/05/2024   , CMP:   Lab Results   Component Value Date    SODIUM 144 09/05/2024    K 5.0 09/05/2024    CL 98 09/05/2024    CO2 >45 (H) 09/05/2024    BUN 38 (H) 09/05/2024    CREATININE 1.07 09/05/2024    CALCIUM 9.2 09/05/2024    EGFR 62 09/05/2024   , PT/INR: No results found for: \"PT\", \"INR\"      Imaging and other studies: I have personally reviewed pertinent films in PACS    Chest x-ray right basilar opacity  "

## 2024-09-05 NOTE — ASSESSMENT & PLAN NOTE
Encephalopathy likely secondary to COPD exacerbation, pneumonia, steroids  Placed on continue observation  Frequent redirection and reorientation  Depakote as needed

## 2024-09-05 NOTE — ASSESSMENT & PLAN NOTE
Pt presented to the ED with shortness of breath and wheezing, improved somewhat following therapies  CXR similar to previous, possible RLL v. chronic scarring/atelectasis demonstrated on previous imaging, official read pending   Suspect COPD exacerbation   Steroids were discontinued as likely causing confusion and agitation  Continue empiric antibiotics  Pulmonary input appreciated

## 2024-09-05 NOTE — UTILIZATION REVIEW
SEE INITIAL REVIEW AT BOTTOM    Continued Stay Review    Date: 9/5  Day 2 IP:                         Current Patient Class: Inpatient  Current Level of Care: MS     HPI:89 y.o. male initially admitted on 9/3 with acute resp failure, acute COPD exac, SIRs     Assessment/Plan:   Acute hypoxic/hypercapnic on chronic hypoxic/hypercapnic respiratory failure, severe JAY, R basilar opacity, severe COPD - remains on IV antibiotics with change from IV Cefepime to Ceftriaxone, oxygen 3L NC, was on BIPAP some overnight, stopping IV steroids today.  Continue DuoNebs, Proventil PRN. On exam pt is confused, agitated, breath sounds clear with cough.     Date: 9/6  Day 3: Has surpassed a 2nd midnight with active treatments and services.  Acute hypoxic/hypercapnic on chronic hypoxic/hypercapnic respiratory failure, severe JAY, R basilar opacity, severe COPD - remains on NC oxygen up to 5l above home dosing, IV antibioitics. Releasing restraints, pt is calmer today, continues with 1:1 observation, remains disoriented, not tolerating BIPAP.  On exam has normal breath sounds, no abd pain.      Vital Signs (last 3 days)       Date/Time Temp Pulse Resp BP MAP (mmHg) SpO2 FiO2 (%) Calculated FIO2 (%) - Nasal Cannula O2 Flow Rate (L/min) Nasal Cannula O2 Flow Rate (L/min) O2 Device O2 Interface Device Patient Position - Orthostatic VS Andrea Coma Scale Score Pain    09/06/24 1544 -- -- -- -- -- 94 % -- 32 -- 3 L/min Nasal cannula -- -- -- --    09/06/24 15:06:37 98.3 °F (36.8 °C) 84 18 115/76 89 93 % -- -- -- -- -- -- -- -- --    09/06/24 1247 -- -- -- -- -- -- -- 36 -- 4 L/min Nasal cannula -- -- -- --    09/06/24 1100 -- -- -- -- -- -- -- -- -- -- -- -- -- 15 No Pain    09/06/24 1022 -- -- -- -- -- 91 % -- 36 -- 4 L/min Nasal cannula -- -- -- --    09/06/24 0709 -- -- -- -- -- -- -- 40 -- 5 L/min Nasal cannula -- -- -- --    09/06/24 07:05:05 97.6 °F (36.4 °C) 74 17 151/80 104 90 % -- -- -- -- -- -- -- -- --    09/05/24 1930 -- -- --  -- -- -- -- 36 -- 4 L/min Nasal cannula -- -- -- No Pain    09/05/24 1402 -- -- -- -- -- 97 % -- 32 -- 3 L/min Nasal cannula -- -- -- --    09/05/24 1107 -- -- -- -- -- -- -- -- -- -- -- -- -- -- No Pain    09/05/24 1100 -- -- -- -- -- 95 % -- 32 -- 3 L/min Nasal cannula -- -- 15 --    09/05/24 0734 -- -- -- -- -- 98 % -- -- -- -- -- -- -- -- --    09/05/24 0730 -- -- -- -- -- -- -- 32 -- 3 L/min Nasal cannula -- -- 15 No Pain    09/05/24 07:27:30 97.9 °F (36.6 °C) 81 18 168/83 111 96 % -- 32 -- 3 L/min Nasal cannula -- -- -- --    09/05/24 04:39:33 98.3 °F (36.8 °C) 80 -- 153/73 100 99 % -- -- -- -- -- -- -- -- --    09/04/24 23:21:12 98.7 °F (37.1 °C) 86 20 112/55 74 93 % -- -- -- -- -- -- Lying -- --    09/04/24 19:45:42 97.5 °F (36.4 °C) 83 -- 113/55 74 99 % -- -- -- -- -- -- -- -- --    09/04/24 1945 -- -- -- -- -- -- -- 32 -- 3 L/min Nasal cannula -- -- 15 No Pain    09/1937 -- -- -- -- -- 96 % -- 32 -- 3 L/min Nasal cannula -- -- -- --    09/04/24 16:50:36 98.6 °F (37 °C) 81 -- 113/55 74 94 % -- -- -- -- -- -- -- -- --    09/04/24 1419 -- -- -- -- -- -- -- 36 -- 4 L/min Mid flow nasal cannula -- -- -- --    09/04/24 1210 -- -- -- -- -- -- -- -- -- -- -- -- -- -- No Pain    09/04/24 1200 98.4 °F (36.9 °C) 87 33 124/64 87 98 % -- 52 -- 8 L/min Mid flow nasal cannula -- -- 15 --    09/04/24 1000 -- 85 25 145/67 97 91 % -- -- -- -- -- -- -- -- --    09/04/24 0800 98.1 °F (36.7 °C) 78 16 121/77 93 97 % -- -- -- -- -- -- -- 15 No Pain    09/04/24 0746 -- -- -- -- -- -- -- -- -- -- -- Full face mask -- -- --    09/04/24 0745 -- -- -- -- -- -- 40 -- -- -- BiPAP -- -- -- --    09/04/24 0600 -- 77 23 135/62 89 96 % -- -- -- -- -- -- -- -- --    09/04/24 0500 -- 74 21 131/58 84 97 % -- -- -- -- -- -- -- -- --    09/04/24 0430 97.5 °F (36.4 °C) 77 26 132/62 89 95 % -- -- -- -- -- -- -- -- --    09/04/24 0410 -- -- -- -- -- -- -- -- -- -- -- -- -- 15 No Pain    09/04/24 0404 -- -- -- -- -- 93 % -- -- -- -- -- Full  face mask -- -- --    09/04/24 0333 96.6 °F (35.9 °C) 88 52 160/101 126 98 % -- -- -- -- -- -- -- -- --    09/04/24 0300 -- 86 26 128/62 89 98 % -- -- -- -- Mid flow nasal cannula -- Lying -- --    09/04/24 0230 -- 85 22 139/64 92 98 % -- 60 -- 10 L/min Mid flow nasal cannula -- Sitting -- --    09/04/24 0200 -- 83 22 137/63 90 98 % -- 60 -- 10 L/min Mid flow nasal cannula -- Sitting -- --    09/04/24 0130 -- 88 24 126/62 89 97 % -- 60 -- 10 L/min Mid flow nasal cannula -- Sitting -- --    09/04/24 0100 -- 88 29 126/59 85 95 % -- 60 -- 10 L/min Mid flow nasal cannula -- Sitting -- --    09/04/24 0030 -- 92 24 131/60 86 97 % -- 60 -- 10 L/min Mid flow nasal cannula -- Sitting -- --    09/04/24 0000 -- 89 24 145/74 101 97 % -- -- -- -- Mid flow nasal cannula -- Sitting -- --    09/03/24 2355 -- -- -- -- -- 95 % -- -- -- -- Mid flow nasal cannula -- -- -- --    09/03/24 2342 -- -- -- -- -- 92 % -- 60 -- 10 L/min Nasal cannula -- -- -- --    09/03/24 2341 -- -- -- -- -- 85 % -- -- -- -- None (Room air) -- -- -- --    09/03/24 2309 -- -- -- -- -- 95 % -- -- -- -- -- Full face mask -- -- --    09/03/24 2308 -- -- -- -- -- 90 % -- -- -- -- -- -- -- -- --    09/03/24 2300 -- 86 24 120/67 88 92 % -- -- -- -- BiPAP -- Lying -- --    09/03/24 2230 -- 89 22 119/59 83 92 % -- -- -- -- BiPAP -- Lying -- --    09/03/24 2130 -- 91 22 133/60 87 95 % -- -- -- -- BiPAP -- Lying -- --    09/03/24 2116 -- -- -- -- -- 93 % 50 -- -- -- BiPAP Full face mask -- -- --    09/03/24 2115 -- 115 24 172/132 148 80 %  60 60 -- 10 L/min Aerosol mask -- Lying -- --    09/03/24 2106 -- -- -- -- -- -- -- -- 5 L/min -- Simple mask -- -- -- --    09/03/24 2104 -- -- -- -- -- -- -- -- -- -- -- -- -- 13 --    09/03/24 2101 100.2 °F (37.9 °C) 101 22 -- -- 91 % -- 44 -- 6 L/min Simple mask -- Lying -- --          Weight (last 2 days)       Date/Time Weight    09/06/24 0600 59.2 (130.51)    09/05/24 0536 58.9 (129.85)    09/04/24 0539 59.2 (130.51)     09/04/24 0333 59.2 (130.51)              Pertinent Labs/Diagnostic Results:   Radiology:  XR chest portable   Final Interpretation by Rico Aquino MD (09/06 1248)      Increased density of right lung base infiltrate suggesting worsening pneumonia. Questionable trace left effusion.            Workstation performed: [id number]         XR chest 1 view portable   ED Interpretation by Vaibhav Monet DO (09/03 2205)   Abnormal   Right lower lobe infiltrate      Final Interpretation by Kelsy Rivera MD (09/04 0947)      Moderate opacity in the right base, question persistent/recurrent pneumonia as that is the site of pneumonia on the chest CT from 8/8/2024 versus postinfectious scar.      Emphysema.            Workstation performed: [id number]           Cardiology:  ECG 12 lead   Final Result by Paul London MD (09/04 0803)   Sinus rhythm with Premature atrial complexes   Possible Anterior infarct , age undetermined   Abnormal ECG   When compared with ECG of 03-SEP-2024 20:59,   No significant change was found   Confirmed by Paul London ([id number]) on 9/4/2024 8:03:06 AM        GI:  No orders to display       Results from last 7 days   Lab Units 09/03/24 2113   SARS-COV-2  Negative     Results from last 7 days   Lab Units 09/06/24  0607 09/05/24  1143 09/05/24 0437 09/04/24 0555 09/03/24 2114   WBC Thousand/uL 9.63  --  8.39 10.53* 9.67   HEMOGLOBIN g/dL 11.3*  --  9.7* 10.7* 11.0*   I STAT HEMOGLOBIN g/dl  --  11.2*  --   --   --    HEMATOCRIT % 38.9  --  33.9* 37.9 39.0   HEMATOCRIT, ISTAT %  --  33*  --   --   --    PLATELETS Thousands/uL 145*  --  117* 105* 125*   TOTAL NEUT ABS Thousands/µL 6.07  --  7.31  --  6.98   BANDS PCT %  --   --   --  13*  --          Results from last 7 days   Lab Units 09/06/24  0607 09/05/24  1143 09/05/24  0437 09/04/24  0555 09/03/24 2114   SODIUM mmol/L 146  --  144 143 145   POTASSIUM mmol/L 4.5  --  5.0 4.8 4.4   CHLORIDE mmol/L 97  --  98 96 98    CO2 mmol/L >45*  --  >45* 44* >45*   CO2, I-STAT mmol/L  --  >45*  --   --   --    ANION GAP mmol/L  --   --   --  3*  --    BUN mg/dL 35*  --  38* 28* 24   CREATININE mg/dL 0.92  --  1.07 0.91 0.82   EGFR ml/min/1.73sq m 74  --  62 75 79   CALCIUM mg/dL 9.6  --  9.2 9.4 9.3   CALCIUM, IONIZED, ISTAT mmol/L  --  1.21  --   --   --    MAGNESIUM mg/dL  --   --   --  2.7  --      Results from last 7 days   Lab Units 09/03/24  2114   AST U/L 30   ALT U/L 24   ALK PHOS U/L 66   TOTAL PROTEIN g/dL 6.8   ALBUMIN g/dL 3.7   TOTAL BILIRUBIN mg/dL 0.41     Results from last 7 days   Lab Units 09/06/24  1615 09/06/24  1104 09/06/24  0743 09/05/24  2134 09/05/24  1624 09/05/24  1110 09/05/24  0826 09/04/24  2132 09/04/24  1728 09/04/24  1555 09/04/24  1142   POC GLUCOSE mg/dl 187* 111 94 152* 128 230* 141* 174* 149* 156* 341*     Results from last 7 days   Lab Units 09/06/24  0607 09/05/24  0437 09/04/24  0555 09/03/24  2114   GLUCOSE RANDOM mg/dL 93 215* 248* 168*     Results from last 7 days   Lab Units 09/04/24  0840 09/04/24  0555 09/04/24  0311   PH DUSTIN  7.263* 7.203* 7.163*   PCO2 DUSTIN mm Hg 89.4* 113.9* 134.8*   PO2 DUSTIN mm Hg 57.3* 46.9* 105.3*   HCO3 DUSTIN mmol/L 39.5* 43.8* 47.3*   BASE EXC DUSTIN mmol/L 9.3 11.7 13.7   O2 CONTENT DUSTIN ml/dL 15.0 12.8 16.8   O2 HGB, VENOUS % 86.7* 76.1 96.3*     Results from last 7 days   Lab Units 09/05/24  1143   I STAT BASE EXC mmol/L 20*   I STAT O2 SAT % 93*   ISTAT PH ART  7.447   I STAT ART PCO2 mm HG 68.1*   I STAT ART PO2 mm HG 70.0*   I STAT ART HCO3 mmol/L 47.0*         Results from last 7 days   Lab Units 09/03/24  2317 09/03/24  2114   HS TNI 0HR ng/L  --  15   HS TNI 2HR ng/L 15  --    HSTNI D2 ng/L 0  --          Results from last 7 days   Lab Units 09/03/24  2114   PROTIME seconds 13.7   INR  1.03   PTT seconds 23         Results from last 7 days   Lab Units 09/06/24  0607 09/04/24  0555 09/03/24 2114   PROCALCITONIN ng/ml 0.10 0.27* 0.08     Results from last 7 days   Lab  Units 09/03/24  2114   LACTIC ACID mmol/L 1.3             Results from last 7 days   Lab Units 09/03/24  2114   BNP pg/mL 36               Results from last 7 days   Lab Units 09/03/24  2344   CLARITY UA  Clear   COLOR UA  Yellow   SPEC GRAV UA  1.033*   PH UA  6.0   GLUCOSE UA mg/dl Negative   KETONES UA mg/dl Negative   BLOOD UA  Negative   PROTEIN UA mg/dl 30 (1+)*   NITRITE UA  Negative   BILIRUBIN UA  Negative   UROBILINOGEN UA (BE) mg/dl <2.0   LEUKOCYTES UA  Negative   WBC UA /hpf 1-2   RBC UA /hpf 1-2   BACTERIA UA /hpf None Seen   EPITHELIAL CELLS WET PREP /hpf Occasional   MUCUS THREADS  Occasional*     Results from last 7 days   Lab Units 09/03/24  2344 09/03/24 2113   STREP PNEUMONIAE ANTIGEN, URINE  Negative  --    LEGIONELLA URINARY ANTIGEN  Negative  --    INFLUENZA A PCR   --  Negative   INFLUENZA B PCR   --  Negative   RSV PCR   --  Negative           Results from last 7 days   Lab Units 09/03/24  2124 09/03/24 2113   BLOOD CULTURE  No Growth at 48 hrs. No Growth at 48 hrs.     Medications:   Scheduled Medications:  budesonide, 0.5 mg, Nebulization, BID  cefTRIAXone, 1,000 mg, Intravenous, Q24H  enoxaparin, 40 mg, Subcutaneous, Daily  formoterol, 20 mcg, Nebulization, BID  guaiFENesin, 600 mg, Oral, BID  insulin lispro, 1-5 Units, Subcutaneous, TID AC  ipratropium, 0.5 mg, Nebulization, 4x Daily  losartan, 25 mg, Oral, Daily  pantoprazole, 40 mg, Oral, Early Morning  senna-docusate sodium, 1 tablet, Oral, Daily  torsemide, 5 mg, Oral, Daily      Continuous IV Infusions:     PRN Meds:  acetaminophen, 650 mg, Oral, Q6H PRN  [Transfer Hold] ipratropium, 0.5 mg, Nebulization, Q4H PRN - x 1 9/4  [Transfer Hold] levalbuterol, 1.25 mg, Nebulization, Q4H PRN - x 1 9/4    Discharge Plan: TBD    Network Utilization Review Department  ATTENTION: Please call with any questions or concerns to [phone] and carefully listen to the prompts so that you are directed to the right person. All voicemails are  confidential.   For Discharge needs, contact Care Management DC Support Team at [phone] opt. 2  Send all requests for admission clinical reviews, approved or denied determinations and any other requests to dedicated fax number below belonging to the campus where the patient is receiving treatment. List of dedicated fax numbers for the Facilities:  FACILITY NAME UR FAX NUMBER   ADMISSION DENIALS (Administrative/Medical Necessity) [phone]   DISCHARGE SUPPORT TEAM (NETWORK) [phone]   PARENT CHILD HEALTH (Maternity/NICU/Pediatrics) [phone]   [facility] [phone]   [facility] [phone]   [facility] [phone]   [facility] [phone]   [facility] [phone]   [facility] [phone]   [facility] [phone]   [facility] [phone]   [facility] [phone]   [facility] [phone]   [facility] [phone]   [facility] [phone]

## 2024-09-05 NOTE — PLAN OF CARE
Problem: PAIN - ADULT  Goal: Verbalizes/displays adequate comfort level or baseline comfort level  Description: Interventions:  - Encourage patient to monitor pain and request assistance  - Assess pain using appropriate pain scale  - Administer analgesics based on type and severity of pain and evaluate response  - Implement non-pharmacological measures as appropriate and evaluate response  - Consider cultural and social influences on pain and pain management  - Notify physician/advanced practitioner if interventions unsuccessful or patient reports new pain  Outcome: Progressing     Problem: INFECTION - ADULT  Goal: Absence or prevention of progression during hospitalization  Description: INTERVENTIONS:  - Assess and monitor for signs and symptoms of infection  - Monitor lab/diagnostic results  - Monitor all insertion sites, i.e. indwelling lines, tubes, and drains  - Monitor endotracheal if appropriate and nasal secretions for changes in amount and color  - [place] appropriate cooling/warming therapies per order  - Administer medications as ordered  - Instruct and encourage patient and family to use good hand hygiene technique  - Identify and instruct in appropriate isolation precautions for identified infection/condition  Outcome: Progressing  Goal: Absence of fever/infection during neutropenic period  Description: INTERVENTIONS:  - Monitor WBC    Outcome: Progressing     Problem: SAFETY ADULT  Goal: Patient will remain free of falls  Description: INTERVENTIONS:  - Educate patient/family on patient safety including physical limitations  - Instruct patient to call for assistance with activity   - Consult OT/PT to assist with strengthening/mobility   - Keep Call bell within reach  - Keep bed low and locked with side rails adjusted as appropriate  - Keep care items and personal belongings within reach  - Initiate and maintain comfort rounds  - Make Fall Risk Sign visible to staff  - Offer Toileting every  Hours,  in advance of need  - Initiate/Maintain alarm  - Obtain necessary fall risk management equipment:   - Apply yellow socks and bracelet for high fall risk patients  - Consider moving patient to room near nurses station  Outcome: Progressing  Goal: Maintain or return to baseline ADL function  Description: INTERVENTIONS:  -  Assess patient's ability to carry out ADLs; assess patient's baseline for ADL function and identify physical deficits which impact ability to perform ADLs (bathing, care of mouth/teeth, toileting, grooming, dressing, etc.)  - Assess/evaluate cause of self-care deficits   - Assess range of motion  - Assess patient's mobility; develop plan if impaired  - Assess patient's need for assistive devices and provide as appropriate  - Encourage maximum independence but intervene and supervise when necessary  - Involve family in performance of ADLs  - Assess for home care needs following discharge   - Consider OT consult to assist with ADL evaluation and planning for discharge  - Provide patient education as appropriate  Outcome: Progressing  Goal: Maintains/Returns to pre admission functional level  Description: INTERVENTIONS:  - Perform AM-PAC 6 Click Basic Mobility/ Daily Activity assessment daily.  - Set and communicate daily mobility goal to care team and patient/family/caregiver.   - Collaborate with rehabilitation services on mobility goals if consulted  - Perform Range of Motion  times a day.  - Reposition patient every  hours.  - Dangle patient  times a day  - Stand patient  times a day  - Ambulate patient  times a day  - Out of bed to chair  times a day   - Out of bed for meals times a day  - Out of bed for toileting  - Record patient progress and toleration of activity level   Outcome: Progressing     Problem: DISCHARGE PLANNING  Goal: Discharge to home or other facility with appropriate resources  Description: INTERVENTIONS:  - Identify barriers to discharge w/patient and caregiver  - Arrange for  needed discharge resources and transportation as appropriate  - Identify discharge learning needs (meds, wound care, etc.)  - Arrange for interpretive services to assist at discharge as needed  - Refer to Case Management Department for coordinating discharge planning if the patient needs post-hospital services based on physician/advanced practitioner order or complex needs related to functional status, cognitive ability, or social support system  Outcome: Progressing     Problem: Knowledge Deficit  Goal: Patient/family/caregiver demonstrates understanding of disease process, treatment plan, medications, and discharge instructions  Description: Complete learning assessment and assess knowledge base.  Interventions:  - Provide teaching at level of understanding  - Provide teaching via preferred learning methods  Outcome: Progressing     Problem: Prexisting or High Potential for Compromised Skin Integrity  Goal: Skin integrity is maintained or improved  Description: INTERVENTIONS:  - Identify patients at risk for skin breakdown  - Assess and monitor skin integrity  - Assess and monitor nutrition and hydration status  - Monitor labs   - Assess for incontinence   - Turn and reposition patient  - Assist with mobility/ambulation  - Relieve pressure over bony prominences  - Avoid friction and shearing  - Provide appropriate hygiene as needed including keeping skin clean and dry  - Evaluate need for skin moisturizer/barrier cream  - Collaborate with interdisciplinary team   - Patient/family teaching  - Consider wound care consult   Outcome: Progressing

## 2024-09-05 NOTE — PLAN OF CARE
Problem: OCCUPATIONAL THERAPY ADULT  Goal: Performs self-care activities at highest level of function for planned discharge setting.  See evaluation for individualized goals.  Description: Treatment Interventions: ADL retraining, Functional transfer training, UE strengthening/ROM, Endurance training, Cognitive reorientation, Patient/family training, Equipment evaluation/education, Neuromuscular reeducation, Compensatory technique education, Continued evaluation, Energy conservation, Activityengagement          See flowsheet documentation for full assessment, interventions and recommendations.   Note: Limitation: Decreased ADL status, Decreased UE strength, Decreased Safe judgement during ADL, Decreased cognition, Decreased endurance, Decreased self-care trans, Decreased high-level ADLs  Prognosis: Guarded  Assessment: Patient is a 89 y.o. year old male seen for OT eval s/p admit to [facility] on 9/3/2024 with acute on chronic resp failure w/ hypoxia and hypercapnia, SIRS.  OT consulted to assess ADLs/IADLs/functional mobility and assist w/ D/C planning. Patient demonstrates the following deficits impacting occupational performance: decreased strength , decreased balance, decreased activity tolerance, limited functional reach, impaired memory, impaired sequencing, impaired problem solving, impulsivity, decreased safety awareness, SOB, mood/behavioral limitations , impaired coordination, decreased cardiovascular endurance, and decreased skin integrity . These impairments, as well at pt’s difficulty performing ADLs, difficulty performing IADLs, difficulty performing transfers/mobility, limited insight into deficits, compliance, decreased initiation and engagement, fall risk , functional decline , new O2 requirements, language barrier, and advanced age, limit pt’s ability to safely engage in all baseline areas of occupation.Pt currently functioning at S level for ADLs/functional mobility w HHA, standing tolerance ~2 min  (seems more behavioral based). Pt would benefit from continued skilled OT while in acute setting to address deficits as defined above and to maximize (I) w/ ADLs/functional mobility. Occupational performance areas to address include: grooming, bathing/shower, toilet hygiene, dressing, medication management, functional mobility, and community mobility. Based on the aforementioned evaluation, functional performance deficits, and assessments, pt has been identified as a high complexity evaluation. At this time, recommendation for pt to receive post-acute rehabilitation services at a Level III (minimum resource intensity) due to above deficits and CLOF. OT will continue to follow pt 1-2x/wk to address the goals listed below to  w/in 10-14 days.     Rehab Resource Intensity Level, OT: III (Minimum Resource Intensity)

## 2024-09-05 NOTE — OCCUPATIONAL THERAPY NOTE
Occupational Therapy Progress Note     Patient Name: Severiano Feliciano  Today's Date: 9/5/2024  Problem List  Principal Problem:    Acute on chronic respiratory failure with hypoxia and hypercapnia (HCC)  Active Problems:    Benign essential hypertension    COPD with acute exacerbation (HCC)    Chronic diastolic (congestive) heart failure (HCC)    SIRS (systemic inflammatory response syndrome) (HCC)       09/05/24 1107   OT Last Visit   OT Visit Date 09/05/24   Note Type   Note type Evaluation   Pain Assessment   Pain Assessment Tool FLACC   Pain Score No Pain   Pain Rating: FLACC (Rest) - Face 0   Pain Rating: FLACC (Rest) - Legs 0   Pain Rating: FLACC (Rest) - Activity 0   Pain Rating: FLACC (Rest) - Cry 0   Pain Rating: FLACC (Rest) - Consolability 0   Score: FLACC (Rest) 0   Pain Rating: FLACC (Activity) - Face 0   Pain Rating: FLACC (Activity) - Legs 0   Pain Rating: FLACC (Activity) - Activity 0   Pain Rating: FLACC (Activity) - Cry 0   Pain Rating: FLACC (Activity) - Consolability 0   Score: FLACC (Activity) 0   Restrictions/Precautions   Weight Bearing Precautions Per Order No   Other Precautions Chair Alarm;Bed Alarm;Multiple lines;Telemetry;O2;Fall Risk;Cognitive  (Family at bedside.)   Home Living   Type of Home House   Home Layout Two level;1/2 bath on main level;Bed/bath upstairs;Stairs to enter with rails   Bathroom Shower/Tub Tub/shower unit   Bathroom Toilet Standard   Bathroom Equipment Shower chair   Home Equipment Walker;Cane;Wheelchair-manual   Prior Function   Level of [place] Needs assistance with ADLs;Needs assistance with functional mobility;Needs assistance with IADLS   Lives With Spouse   Receives Help From Family  (Son is the caregiver M-F for 8 hrs)   IADLs Independent with driving;Independent with meal prep;Independent with medication management   Falls in the last 6 months 0   Comments Information gathered via chart.   Lifestyle   Autonomy PTA, required (A) with ADLs and  required (A) with IADLs.   Reciprocal Relationships Spouse   General   Additional Pertinent History Comorbidities affecting pt’s functional performance include a significant PMH of: HTN, COPD, CHF, h/o DVT, popliteal artery ectasia b/l, TIA, dysphagia, s/p hernia repair, b/l HL.  Patient with active OT orders.   Family/Caregiver Present No   Subjective   Subjective Pt mostly nonsensical speech. Standing w spouse at bedside.   ADL   Eating Assistance 5  Supervision/Setup   Grooming Assistance 5  Supervision/Setup   UB Bathing Assistance 5  Supervision/Setup   LB Bathing Assistance 5  Supervision/Setup   UB Dressing Assistance 5  Supervision/Setup   LB Dressing Assistance 5  Supervision/Setup   Toileting Assistance  5  Supervision/Setup   Bed Mobility   Supine to Sit 5  Supervision   Sit to Supine 5  Supervision   Transfers   Sit to Stand 5  Supervision   Additional items Impulsive;Verbal cues;Increased time required   Stand to Sit 5  Supervision   Additional items Impulsive;Verbal cues;Increased time required   Functional Mobility   Functional Mobility 5  Supervision   Additional Comments no AD use. Impulsive. HHA.   Additional items Hand hold assistance   Balance   Static Sitting Good   Dynamic Sitting Fair +   Static Standing Fair -   Dynamic Standing Fair -   Ambulatory Fair -   Activity Tolerance   Activity Tolerance Other (Comment)  (Mood/behavioral limitations)   Medical Staff Made Aware PT   Nurse Made Aware Yes   RUE Assessment   RUE Assessment WFL   LUE Assessment   LUE Assessment WFL   Hand Function   Gross Motor Coordination Functional   Fine Motor Coordination Functional   Psychosocial   Patient Behaviors/Mood Anxious;Irritable;Labile   Cognition   Overall Cognitive Status Impaired   Arousal/Participation Alert   Attention Difficulty attending to directions   Orientation Level Unable to assess   Memory Decreased recall of precautions;Decreased short term memory;Decreased recall of biographical  information;Decreased recall of recent events;Decreased long term memory   Following Commands Follows one step commands with increased time or repetition   Assessment   Limitation Decreased ADL status;Decreased UE strength;Decreased Safe judgement during ADL;Decreased cognition;Decreased endurance;Decreased self-care trans;Decreased high-level ADLs   Prognosis Guarded   Assessment Patient is a 89 y.o. year old male seen for OT eval s/p admit to [facility] on 9/3/2024 with acute on chronic resp failure w/ hypoxia and hypercapnia, SIRS.  OT consulted to assess ADLs/IADLs/functional mobility and assist w/ D/C planning. Patient demonstrates the following deficits impacting occupational performance: decreased strength , decreased balance, decreased activity tolerance, limited functional reach, impaired memory, impaired sequencing, impaired problem solving, impulsivity, decreased safety awareness, SOB, mood/behavioral limitations , impaired coordination, decreased cardiovascular endurance, and decreased skin integrity . These impairments, as well at pt’s difficulty performing ADLs, difficulty performing IADLs, difficulty performing transfers/mobility, limited insight into deficits, compliance, decreased initiation and engagement, fall risk , functional decline , new O2 requirements, language barrier, and advanced age, limit pt’s ability to safely engage in all baseline areas of occupation.Pt currently functioning at S level for ADLs/functional mobility w HHA, standing tolerance ~2 min (seems more behavioral based). Pt would benefit from continued skilled OT while in acute setting to address deficits as defined above and to maximize (I) w/ ADLs/functional mobility. Occupational performance areas to address include: grooming, bathing/shower, toilet hygiene, dressing, medication management, functional mobility, and community mobility. Based on the aforementioned evaluation, functional performance deficits, and assessments, pt has  been identified as a high complexity evaluation. At this time, recommendation for pt to receive post-acute rehabilitation services at a Level III (minimum resource intensity) due to above deficits and CLOF. OT will continue to follow pt 1-2x/wk to address the goals listed below to  w/in 10-14 days.   Goals   Patient Goals Pt spouse would like pt to return home   LTG Time Frame 10-14   Plan   Treatment Interventions ADL retraining;Functional transfer training;UE strengthening/ROM;Endurance training;Cognitive reorientation;Patient/family training;Equipment evaluation/education;Neuromuscular reeducation;Compensatory technique education;Continued evaluation;Energy conservation;Activityengagement   Goal Expiration Date 24   OT Treatment Day 0   OT Frequency 1-2x/wk   Discharge Recommendation   Rehab Resource Intensity Level, OT III (Minimum Resource Intensity)   AM-PAC Daily Activity Inpatient   Lower Body Dressing 3   Bathing 3   Toileting 3   Upper Body Dressing 3   Grooming 3   Eating 3   Daily Activity Raw Score 18   Daily Activity Standardized Score (Calc for Raw Score >=11) 38.66     Lizzie Rodrigues

## 2024-09-06 ENCOUNTER — APPOINTMENT (INPATIENT)
Dept: RADIOLOGY | Facility: HOSPITAL | Age: 89
DRG: 193 | End: 2024-09-06
Payer: MEDICARE

## 2024-09-06 LAB
BASOPHILS # BLD AUTO: 0.03 THOUSANDS/ÂΜL (ref 0–0.1)
BASOPHILS NFR BLD AUTO: 0 % (ref 0–1)
BUN SERPL-MCNC: 35 MG/DL (ref 5–25)
CALCIUM SERPL-MCNC: 9.6 MG/DL (ref 8.4–10.2)
CHLORIDE SERPL-SCNC: 97 MMOL/L (ref 96–108)
CO2 SERPL-SCNC: >45 MMOL/L (ref 21–32)
CREAT SERPL-MCNC: 0.92 MG/DL (ref 0.6–1.3)
EOSINOPHIL # BLD AUTO: 0.22 THOUSAND/ÂΜL (ref 0–0.61)
EOSINOPHIL NFR BLD AUTO: 2 % (ref 0–6)
ERYTHROCYTE [DISTWIDTH] IN BLOOD BY AUTOMATED COUNT: 13.2 % (ref 11.6–15.1)
GFR SERPL CREATININE-BSD FRML MDRD: 74 ML/MIN/1.73SQ M
GLUCOSE SERPL-MCNC: 111 MG/DL (ref 65–140)
GLUCOSE SERPL-MCNC: 178 MG/DL (ref 65–140)
GLUCOSE SERPL-MCNC: 187 MG/DL (ref 65–140)
GLUCOSE SERPL-MCNC: 93 MG/DL (ref 65–140)
GLUCOSE SERPL-MCNC: 94 MG/DL (ref 65–140)
HCT VFR BLD AUTO: 38.9 % (ref 36.5–49.3)
HGB BLD-MCNC: 11.3 G/DL (ref 12–17)
IMM GRANULOCYTES # BLD AUTO: 0.04 THOUSAND/UL (ref 0–0.2)
IMM GRANULOCYTES NFR BLD AUTO: 0 % (ref 0–2)
LYMPHOCYTES # BLD AUTO: 2.05 THOUSANDS/ÂΜL (ref 0.6–4.47)
LYMPHOCYTES NFR BLD AUTO: 21 % (ref 14–44)
MCH RBC QN AUTO: 31.2 PG (ref 26.8–34.3)
MCHC RBC AUTO-ENTMCNC: 29 G/DL (ref 31.4–37.4)
MCV RBC AUTO: 108 FL (ref 82–98)
MONOCYTES # BLD AUTO: 1.22 THOUSAND/ÂΜL (ref 0.17–1.22)
MONOCYTES NFR BLD AUTO: 13 % (ref 4–12)
NEUTROPHILS # BLD AUTO: 6.07 THOUSANDS/ÂΜL (ref 1.85–7.62)
NEUTS SEG NFR BLD AUTO: 64 % (ref 43–75)
NRBC BLD AUTO-RTO: 0 /100 WBCS
PLATELET # BLD AUTO: 145 THOUSANDS/UL (ref 149–390)
PMV BLD AUTO: 9.6 FL (ref 8.9–12.7)
POTASSIUM SERPL-SCNC: 4.5 MMOL/L (ref 3.5–5.3)
PROCALCITONIN SERPL-MCNC: 0.1 NG/ML
RBC # BLD AUTO: 3.62 MILLION/UL (ref 3.88–5.62)
SODIUM SERPL-SCNC: 146 MMOL/L (ref 135–147)
WBC # BLD AUTO: 9.63 THOUSAND/UL (ref 4.31–10.16)

## 2024-09-06 PROCEDURE — 94760 N-INVAS EAR/PLS OXIMETRY 1: CPT

## 2024-09-06 PROCEDURE — 82948 REAGENT STRIP/BLOOD GLUCOSE: CPT

## 2024-09-06 PROCEDURE — 99232 SBSQ HOSP IP/OBS MODERATE 35: CPT | Performed by: INTERNAL MEDICINE

## 2024-09-06 PROCEDURE — 80048 BASIC METABOLIC PNL TOTAL CA: CPT | Performed by: INTERNAL MEDICINE

## 2024-09-06 PROCEDURE — 97110 THERAPEUTIC EXERCISES: CPT

## 2024-09-06 PROCEDURE — 85025 COMPLETE CBC W/AUTO DIFF WBC: CPT | Performed by: INTERNAL MEDICINE

## 2024-09-06 PROCEDURE — 94640 AIRWAY INHALATION TREATMENT: CPT

## 2024-09-06 PROCEDURE — 97530 THERAPEUTIC ACTIVITIES: CPT

## 2024-09-06 PROCEDURE — 97116 GAIT TRAINING THERAPY: CPT

## 2024-09-06 PROCEDURE — 84145 PROCALCITONIN (PCT): CPT | Performed by: NURSE PRACTITIONER

## 2024-09-06 PROCEDURE — 71045 X-RAY EXAM CHEST 1 VIEW: CPT

## 2024-09-06 RX ADMIN — BUDESONIDE 0.5 MG: 0.5 INHALANT ORAL at 07:06

## 2024-09-06 RX ADMIN — IPRATROPIUM BROMIDE 0.5 MG: 0.5 SOLUTION RESPIRATORY (INHALATION) at 20:13

## 2024-09-06 RX ADMIN — IPRATROPIUM BROMIDE 0.5 MG: 0.5 SOLUTION RESPIRATORY (INHALATION) at 15:45

## 2024-09-06 RX ADMIN — BUDESONIDE 0.5 MG: 0.5 INHALANT ORAL at 20:13

## 2024-09-06 RX ADMIN — FORMOTEROL FUMARATE DIHYDRATE 20 MCG: 20 SOLUTION RESPIRATORY (INHALATION) at 20:13

## 2024-09-06 RX ADMIN — IPRATROPIUM BROMIDE 0.5 MG: 0.5 SOLUTION RESPIRATORY (INHALATION) at 12:47

## 2024-09-06 RX ADMIN — INSULIN LISPRO 1 UNITS: 100 INJECTION, SOLUTION INTRAVENOUS; SUBCUTANEOUS at 17:37

## 2024-09-06 RX ADMIN — FORMOTEROL FUMARATE DIHYDRATE 20 MCG: 20 SOLUTION RESPIRATORY (INHALATION) at 07:06

## 2024-09-06 RX ADMIN — IPRATROPIUM BROMIDE 0.5 MG: 0.5 SOLUTION RESPIRATORY (INHALATION) at 07:06

## 2024-09-06 RX ADMIN — ENOXAPARIN SODIUM 40 MG: 40 INJECTION SUBCUTANEOUS at 07:56

## 2024-09-06 RX ADMIN — DEXTROSE 1000 MG: 50 INJECTION, SOLUTION INTRAVENOUS at 21:46

## 2024-09-06 NOTE — PROGRESS NOTES
"Progress Note - Pulmonary   Severiano Feliciano 89 y.o. male MRN: [id number]  Unit/Bed#: [place] [address] -01 Encounter: [id number]    Assessment/Plan:    1. Acute hypoxic/hypercapnic on chronic hypoxic/hypercapnic respiratory failure likely multifaceted as listed below        -Currently on 5 L 90%, home O2 requirements 3 L       -Continue sats greater than 88%       -Pulmonary toileting: Deep breathing cough out of bed as tolerated incentive spirometry    2.  Severe JAY         -Again patient was intolerant of BiPAP nightly        -Unfortunately his CO2 will continue to rise and he will likely need ICU/intubation if he is not compliant with his BiPAP        -Given significant somnolence will order repeat ABG        -Recommend palliative care discussion        -Home compliance: Only about an hour-for about 1 or 2 days        -Will need mask fitting with nasal pillows    3.  Abnormal chest x-ray        -Persistent right basilar opacity        -Likely reoccurring aspiration        -Procalcitonin: 0.08--0.27--will repeat        -Day # 4-ceftriaxone        -MRSA negative        -Given increased O2 requirements will update chest x-ray    4.  Very severe COPD without acute exacerbation        -Inpatient: Budesonide twice daily, Xopenex/Atrovent 3 times daily        -Monitoring off all steroids for now given change in mental status        -Home regimen:    5.  Change in mental status        -Family does report that he is more aggressive        -Serum bicarb remains significantly elevated        -9/5-ABG compensated              Subjective:    Severiano still remains significantly disoriented throughout the night however today he has been sleeping.  Overnight events include patient still not tolerating BiPAP.  Denying any fevers, chills, night sweats, pleuritic chest pain, or palpitations.    Objective:    Vitals: Blood pressure 151/80, pulse 74, temperature 97.6 °F (36.4 °C), resp. rate 17, height 5' 3\" (1.6 m), weight 59.2 " "kg (130 lb 8.2 oz), SpO2 90%.,Body mass index is 23.12 kg/m².      Intake/Output Summary (Last 24 hours) at 9/6/2024 0938  Last data filed at 9/5/2024 2334  Gross per 24 hour   Intake 0 ml   Output 200 ml   Net -200 ml       Invasive Devices       Peripheral Intravenous Line  Duration             Peripheral IV 09/05/24 Dorsal (posterior);Right Forearm <1 day                    Physical Exam:     Physical Exam  Constitutional:       General: He is not in acute distress.     Appearance: Normal appearance. He is normal weight. He is not ill-appearing.   HENT:      Head: Normocephalic and atraumatic.      Nose: Nose normal. No congestion or rhinorrhea.      Mouth/Throat:      Mouth: Mucous membranes are dry.      Pharynx: Oropharynx is clear. No oropharyngeal exudate or posterior oropharyngeal erythema.   Cardiovascular:      Rate and Rhythm: Normal rate and regular rhythm.      Pulses: Normal pulses.      Heart sounds: Normal heart sounds. No murmur heard.     No friction rub.   Pulmonary:      Effort: Pulmonary effort is normal. No respiratory distress.      Breath sounds: No stridor. No wheezing, rhonchi or rales.   Chest:      Chest wall: No tenderness.   Abdominal:      General: Abdomen is flat. Bowel sounds are normal. There is no distension.      Palpations: Abdomen is soft. There is no mass.   Musculoskeletal:         General: Normal range of motion.      Cervical back: Normal range of motion. No rigidity or tenderness.   Skin:     General: Skin is warm and dry.      Coloration: Skin is not jaundiced or pale.   Neurological:      General: No focal deficit present.      Mental Status: He is alert. Mental status is at baseline. He is disoriented.   Psychiatric:         Mood and Affect: Mood normal.         Behavior: Behavior normal.         Labs: I have personally reviewed pertinent lab results., ABG: No results found for: \"PHART\", \"UAT0HYU\", \"PO2ART\", \"KRI1SWV\", \"O1DNWDGK\", \"BEART\", \"SOURCE\", BNP: No results " "found for: \"BNP\", CBC:   Lab Results   Component Value Date    WBC 9.63 09/06/2024    HGB 11.3 (L) 09/06/2024    HCT 38.9 09/06/2024     (H) 09/06/2024     (L) 09/06/2024    RBC 3.62 (L) 09/06/2024    MCH 31.2 09/06/2024    MCHC 29.0 (L) 09/06/2024    RDW 13.2 09/06/2024    MPV 9.6 09/06/2024    NRBC 0 09/06/2024   , CMP:   Lab Results   Component Value Date    SODIUM 146 09/06/2024    K 4.5 09/06/2024    CL 97 09/06/2024    CO2 >45 (H) 09/06/2024    CO2 >45 (H) 09/05/2024    BUN 35 (H) 09/06/2024    CREATININE 0.92 09/06/2024    GLUCOSE 175 (H) 09/05/2024    CALCIUM 9.6 09/06/2024    EGFR 74 09/06/2024   , PT/INR: No results found for: \"PT\", \"INR\"        Imaging and other studies: I have personally reviewed pertinent films in PACS  "

## 2024-09-06 NOTE — ASSESSMENT & PLAN NOTE
Encephalopathy likely secondary to COPD exacerbation, pneumonia, steroids  Placed on continue observation  Frequent redirection and reorientation  Family hesitant about using other medications patient did have a reaction to Zyprexa therefore avoid any antipsychotics did try Depakote patient did sleep after that however patient family prefers not to give this

## 2024-09-06 NOTE — PROGRESS NOTES
[facility]  Progress Note  Name: Severiano Feliciano I  MRN: [id number]  Unit/Bed#: [place] [address] -01 I Date of Admission: 9/3/2024   Date of Service: 9/6/2024 I Hospital Day: 2    Assessment & Plan   * Acute on chronic respiratory failure with hypoxia and hypercapnia (HCC)  Assessment & Plan  Patient with PMHx of frequent COPD exacerbations secondary to severe COPD, chronic respiratory failure typically maintained on 3 L nasal cannula, HTN, and CHF presented to the ED secondary to increased SOB, wheezing, and confusion. Patient did initially require Bipap  Patient currently on 5 L oxygen, baseline is 3 L  CXR revealed moderate opacity in right base emphysema   Suspect acute resp failure most likely secondary to COPD exacerbation  Pulmonary input appreciated  Resp protocol  Ween oxygen as tolerated    COPD with acute exacerbation (HCC)  Assessment & Plan  Pt presented to the ED with shortness of breath and wheezing, improved somewhat following therapies  CXR similar to previous, possible RLL v. chronic scarring/atelectasis demonstrated on previous imaging, official read pending   Suspect COPD exacerbation   Steroids were discontinued as likely causing confusion and agitation  Continue empiric antibiotics  Pulmonary input appreciated      SIRS (systemic inflammatory response syndrome) (HCC)  Assessment & Plan  Pt fulfilling SIRS with tachycardia and tachypnea  CXR reveals increased density of right lung base  Urine Legionella, strep pneumo negative  Continue ceftriaxone    Chronic diastolic (congestive) heart failure (HCC)  Assessment & Plan  Wt Readings from Last 3 Encounters:   09/06/24 59.2 kg (130 lb 8.2 oz)   08/13/24 58.1 kg (128 lb)   07/10/24 59 kg (130 lb)     Pt appears euvolemic on exam  BNP 36  Last ECHO 4/2023 EF 66% grade I diastolic dysfunction  Daily weights, I/O  Continue home torsemide    Acute metabolic encephalopathy  Assessment & Plan  Encephalopathy likely secondary  to COPD exacerbation, pneumonia, steroids  Placed on continue observation  Frequent redirection and reorientation  Family hesitant about using other medications patient did have a reaction to Zyprexa therefore avoid any antipsychotics did try Depakote patient did sleep after that however patient family prefers not to give this    Benign essential hypertension  Assessment & Plan  Continue losartan and torsemide             Mobility:  Basic Mobility Inpatient Raw Score: 17  JH-HLM Goal: 5: Stand one or more mins  JH-HLM Achieved: 1: Laying in bed  JH-HLM Goal NOT achieved. Continue with multidisciplinary rounding and encourage appropriate mobility to improve upon JH-HLM goals.    VTE Pharmacologic Prophylaxis:   Pharmacologic: Lovenox    Patient Centered Rounds: I have performed bedside rounds with nursing staff today.    Discussions with Specialists or Other Care Team Provider: Pulmonologist    Education and Discussions with Family / Patient: Updated patient's spouse and daughter    Time Spent for Care:   More than 50% of total time spent on counseling and coordination of care as described above.    Current Length of Stay: 2 day(s)    Current Patient Status: Inpatient   Certification Statement: The patient will continue to require additional inpatient hospital stay due to COPD exacerbation    Discharge Plan / Estimated Discharge Date: 24-48h    Code Status: Level 1 - Full Code      Subjective:   Patient seen and examined at bedside, comfortable, did sleep overnight starting at 3 AM    Objective:     Vitals:   Temp (24hrs), Av °F (36.7 °C), Min:97.6 °F (36.4 °C), Max:98.3 °F (36.8 °C)    Temp:  [97.6 °F (36.4 °C)-98.3 °F (36.8 °C)] 98.3 °F (36.8 °C)  HR:  [74-84] 84  Resp:  [17-18] 18  BP: (115-151)/(76-80) 115/76  SpO2:  [90 %-94 %] 94 %  Body mass index is 23.12 kg/m².     Input and Output Summary (last 24 hours):       Intake/Output Summary (Last 24 hours) at 2024 1630  Last data filed at 2024  1107  Gross per 24 hour   Intake 177 ml   Output 200 ml   Net -23 ml       Physical Exam:    Constitutional: Patient is in no acute distress  HEENT:  Normocephalic, atraumatic  Cardiovascular: Normal S1S2, RRR, No murmurs/rubs/gallops appreciated.  Pulmonary:  Bilateral air entry  Abdominal: Soft, Bowel sounds present, Non-tender, Non-distended  Extremities:  No cyanosis, clubbing or edema.   Neurological: awake, Alert  Skin:  Warm, dry    Additional Data:     Labs:    Results from last 7 days   Lab Units 09/06/24  0607   WBC Thousand/uL 9.63   HEMOGLOBIN g/dL 11.3*   HEMATOCRIT % 38.9   PLATELETS Thousands/uL 145*   SEGS PCT % 64   LYMPHO PCT % 21   MONO PCT % 13*   EOS PCT % 2     Results from last 7 days   Lab Units 09/06/24  0607 09/05/24  1143 09/04/24  0555 09/03/24  2114   POTASSIUM mmol/L 4.5  --    < > 4.4   CHLORIDE mmol/L 97  --    < > 98   CO2 mmol/L >45*  --    < > >45*   CO2, I-STAT mmol/L  --  >45*  --   --    BUN mg/dL 35*  --    < > 24   CREATININE mg/dL 0.92  --    < > 0.82   CALCIUM mg/dL 9.6  --    < > 9.3   ALK PHOS U/L  --   --   --  66   ALT U/L  --   --   --  24   AST U/L  --   --   --  30   GLUCOSE, ISTAT mg/dl  --  175*  --   --     < > = values in this interval not displayed.     Results from last 7 days   Lab Units 09/03/24 2114   INR  1.03        I Have Reviewed All Lab Data Listed Above.    Invasive Devices       Peripheral Intravenous Line  Duration             Peripheral IV 09/05/24 Dorsal (posterior);Right Forearm <1 day                         Recent Cultures (last 7 days):     Results from last 7 days   Lab Units 09/03/24  2344 09/03/24 2124 09/03/24 2113   BLOOD CULTURE   --  No Growth at 48 hrs. No Growth at 48 hrs.   LEGIONELLA URINARY ANTIGEN  Negative  --   --        Last 24 Hours Medication List:   Current Facility-Administered Medications   Medication Dose Route Frequency Provider Last Rate    acetaminophen  650 mg Oral Q6H PRN Oren Leyva PA-C      budesonide  0.5  mg Nebulization BID Marija Khan MD      cefTRIAXone  1,000 mg Intravenous Q24H RAQUEL Franco 1,000 mg (09/05/24 2150)    enoxaparin  40 mg Subcutaneous Daily Oren Leyva PA-C      formoterol  20 mcg Nebulization BID Marija Khan MD      guaiFENesin  600 mg Oral BID Oren Leyva PA-C      insulin lispro  1-5 Units Subcutaneous TID AC Marija Khan MD      ipratropium  0.5 mg Nebulization 4x Daily Marija Khan MD      losartan  25 mg Oral Daily Oren Leyva PA-C      pantoprazole  40 mg Oral Early Morning Oren Leyva PA-C      senna-docusate sodium  1 tablet Oral Daily Oren Leyva PA-C      torsemide  5 mg Oral Daily Oren Leyva PA-C          Today, Patient Was Seen By: Marija Khan MD

## 2024-09-06 NOTE — ASSESSMENT & PLAN NOTE
Pt fulfilling SIRS with tachycardia and tachypnea  CXR reveals increased density of right lung base  Urine Legionella, strep pneumo negative  Continue ceftriaxone

## 2024-09-06 NOTE — ASSESSMENT & PLAN NOTE
Wt Readings from Last 3 Encounters:   09/06/24 59.2 kg (130 lb 8.2 oz)   08/13/24 58.1 kg (128 lb)   07/10/24 59 kg (130 lb)     Pt appears euvolemic on exam  BNP 36  Last ECHO 4/2023 EF 66% grade I diastolic dysfunction  Daily weights, I/O  Continue home torsemide

## 2024-09-06 NOTE — PLAN OF CARE
Problem: PAIN - ADULT  Goal: Verbalizes/displays adequate comfort level or baseline comfort level  Description: Interventions:  - Encourage patient to monitor pain and request assistance  - Assess pain using appropriate pain scale  - Administer analgesics based on type and severity of pain and evaluate response  - Implement non-pharmacological measures as appropriate and evaluate response  - Consider cultural and social influences on pain and pain management  - Notify physician/advanced practitioner if interventions unsuccessful or patient reports new pain  Outcome: Progressing     Problem: INFECTION - ADULT  Goal: Absence or prevention of progression during hospitalization  Description: INTERVENTIONS:  - Assess and monitor for signs and symptoms of infection  - Monitor lab/diagnostic results  - Monitor all insertion sites, i.e. indwelling lines, tubes, and drains  - Monitor endotracheal if appropriate and nasal secretions for changes in amount and color  - [place] appropriate cooling/warming therapies per order  - Administer medications as ordered  - Instruct and encourage patient and family to use good hand hygiene technique  - Identify and instruct in appropriate isolation precautions for identified infection/condition  Outcome: Progressing     Problem: SAFETY ADULT  Goal: Patient will remain free of falls  Description: INTERVENTIONS:  - Educate patient/family on patient safety including physical limitations  - Instruct patient to call for assistance with activity   - Consult OT/PT to assist with strengthening/mobility   - Keep Call bell within reach  - Keep bed low and locked with side rails adjusted as appropriate  - Keep care items and personal belongings within reach  - Initiate and maintain comfort rounds  - Make Fall Risk Sign visible to staff  - Offer Toileting every 2 Hours, in advance of need  - Initiate/Maintain bed alarm  - Obtain necessary fall risk management equipment: walker  - Apply yellow  socks and bracelet for high fall risk patients  - Consider moving patient to room near nurses station  Outcome: Progressing  Goal: Maintain or return to baseline ADL function  Description: INTERVENTIONS:  -  Assess patient's ability to carry out ADLs; assess patient's baseline for ADL function and identify physical deficits which impact ability to perform ADLs (bathing, care of mouth/teeth, toileting, grooming, dressing, etc.)  - Assess/evaluate cause of self-care deficits   - Assess range of motion  - Assess patient's mobility; develop plan if impaired  - Assess patient's need for assistive devices and provide as appropriate  - Encourage maximum independence but intervene and supervise when necessary  - Involve family in performance of ADLs  - Assess for home care needs following discharge   - Consider OT consult to assist with ADL evaluation and planning for discharge  - Provide patient education as appropriate  Outcome: Progressing  Goal: Maintains/Returns to pre admission functional level  Description: INTERVENTIONS:  - Perform AM-PAC 6 Click Basic Mobility/ Daily Activity assessment daily.  - Set and communicate daily mobility goal to care team and patient/family/caregiver.   - Collaborate with rehabilitation services on mobility goals if consulted  - Perform Range of Motion 3 times a day.  - Reposition patient every 2 hours.  - Dangle patient 3 times a day  - Stand patient 3 times a day  - Ambulate patient 3 times a day  - Out of bed to chair 3 times a day   - Out of bed for meals 3 times a day  - Out of bed for toileting  - Record patient progress and toleration of activity level   Outcome: Progressing     Problem: DISCHARGE PLANNING  Goal: Discharge to home or other facility with appropriate resources  Description: INTERVENTIONS:  - Identify barriers to discharge w/patient and caregiver  - Arrange for needed discharge resources and transportation as appropriate  - Identify discharge learning needs (meds,  wound care, etc.)  - Arrange for interpretive services to assist at discharge as needed  - Refer to Case Management Department for coordinating discharge planning if the patient needs post-hospital services based on physician/advanced practitioner order or complex needs related to functional status, cognitive ability, or social support system  Outcome: Progressing     Problem: Knowledge Deficit  Goal: Patient/family/caregiver demonstrates understanding of disease process, treatment plan, medications, and discharge instructions  Description: Complete learning assessment and assess knowledge base.  Interventions:  - Provide teaching at level of understanding  - Provide teaching via preferred learning methods  Outcome: Progressing     Problem: Prexisting or High Potential for Compromised Skin Integrity  Goal: Skin integrity is maintained or improved  Description: INTERVENTIONS:  - Identify patients at risk for skin breakdown  - Assess and monitor skin integrity  - Assess and monitor nutrition and hydration status  - Monitor labs   - Assess for incontinence   - Turn and reposition patient  - Assist with mobility/ambulation  - Relieve pressure over bony prominences  - Avoid friction and shearing  - Provide appropriate hygiene as needed including keeping skin clean and dry  - Evaluate need for skin moisturizer/barrier cream  - Collaborate with interdisciplinary team   - Patient/family teaching  - Consider wound care consult   Outcome: Progressing     Problem: CONFUSION/THOUGHT DISTURBANCE  Goal: Thought disturbances (confusion, delirium, depression, dementia or psychosis) are managed to maintain or return to baseline mental status and functional level  Description: INTERVENTIONS:  - Assess for possible contributors to  thought disturbance, including but not limited to medications, infection, impaired vision or hearing, underlying metabolic abnormalities, dehydration, respiratory compromise,  psychiatric diagnoses and  notify attending PHYSICAN/AP  - Monitor and intervene to maintain adequate nutrition, hydration, elimination, sleep and activity  - Decrease environmental stimuli, including noise as appropriate.  - Provide frequent contacts to provide refocusing, direction and reassurance as needed. Approach patient calmly with eye contact and at their level.  - [place] high risk fall precautions, aspiration precautions and other safety measures, as indicated  - If delirium suspected, notify physician/AP of change in condition and request immediate in-person evaluation  - Pursue consults as appropriate including Geriatric (campus dependent), OT for cognitive evaluation/activity planning, psychiatric, pastoral care, etc.  Outcome: Progressing

## 2024-09-06 NOTE — PHYSICAL THERAPY NOTE
PHYSICAL THERAPY NOTE          Patient Name: Severiano Feliciano  Today's Date: 9/6/2024 09/06/24 1445   Note Type   Note Type Treatment   Pain Assessment   Pain Assessment Tool 0-10   Pain Score No Pain   Restrictions/Precautions   Other Precautions Chair Alarm;Bed Alarm;Cognitive;O2;Fall Risk  (4l nc)   General   Chart Reviewed Yes   Family/Caregiver Present Yes  (wife)   Cognition   Overall Cognitive Status Impaired   Arousal/Participation Alert   Attention Attends with cues to redirect   Orientation Level Unable to assess   Following Commands Follows one step commands with increased time or repetition   Subjective   Subjective pt  agreeable to PT.  pt  denies pain.   Bed Mobility   Rolling R 4  Minimal assistance   Additional items Assist x 1;Bedrails;Increased time required;Verbal cues   Supine to Sit 5  Supervision   Additional items Assist x 1;Increased time required;Verbal cues   Transfers   Sit to Stand 4  Minimal assistance   Additional items Assist x 1;Verbal cues;Increased time required   Stand to Sit 4  Minimal assistance   Additional items Assist x 1;Armrests;Increased time required;Verbal cues   Ambulation/Elevation   Gait pattern Forward Flexion;Antalgic   Gait Assistance 4  Minimal assist   Additional items Assist x 1;Verbal cues   Assistive Device Rolling walker   Distance 250' x1   Ambulation/Elevation Additional Comments verbal cues and assistance for walker management and steering of Rw in environment and around obstacles.  pt  tends to veer towrd the R.  pt  requires assistance to steer walker and maintain a straight path.   Balance   Static Sitting Good   Dynamic Sitting Fair +   Static Standing Fair -   Dynamic Standing Poor +   Ambulatory Poor +   Activity Tolerance   Activity Tolerance Patient tolerated treatment well   Exercises   Hip Abduction Sitting;15 reps;AROM;Bilateral   Hip Adduction Sitting;15  reps;AROM;Bilateral  (isometric hip add  x 15 reps.)   Knee AROM Long Arc Quad Sitting;15 reps;AROM;Bilateral   Ankle Pumps Sitting;15 reps;AROM;Bilateral   Marching Sitting;15 reps;AROM;Bilateral   Assessment   Prognosis Good   Problem List Decreased strength;Decreased endurance;Impaired balance;Decreased mobility;Decreased cognition;Impaired judgement;Decreased safety awareness   Assessment Pt seen for PT treatment session this date with interventions consisting of bed mobility, transfer training, gait training, and HEP, and education provided as needed for safety and direction to improve functional mobility, safety awareness, and activity tolerance. Pt agreeable to PT treatment session upon arrival, pt found supine in bed . At end of session, pt left  seated out of bed in recliner with all needs in reach. In comparison to previous session, pt with improvement in activity tolerance, endurance, ambulation distances, and functional mobility. Pt  is showing progression toward PT goals with improvements as noted.  Pt is requiring less assistance for bed mobility,  min assist x1 for transfers and ambulation on levels with use of Rw.  Pt  requires assistance for steering of walker and management due to deviation from path toward the R and navigation around obstacles in path.  Pt  progressed with ambulation distances to 250'. No gross lob noted.  No leahy noted on 4L o2 nc, tolerated well.   Pt  performs seated b/l le arom and isometric exercises with visual, tactile and verbal cues for correct performance and techniques. Pt tolerated well.  Recommend out of bed for meals and use of bathroom for toileting.  Continue to recommend  level III minimal rehab resource intensity  at time of d/c in order to maximize pt's functional independence and safety w/ mobility. Pt continues to be functioning below baseline level. PT will continue to see pt while here in order to address the deficits listed above and provide interventions  consistent w/ POC in effort to achieve STGs.    The patient's AM-PAC Basic Mobility Inpatient Short Form Raw Score is 17. A raw score greater than 16 suggests the patient may benefit from discharge to home. Please also refer to the recommendation of the Physical Therapist for safe discharge planning.   Goals   Patient Goals pt's spouse's goal is to bring pt home.   STG Expiration Date 09/18/24   PT Treatment Day 1   Plan   Treatment/Interventions Functional transfer training;LE strengthening/ROM;Therapeutic exercise;Endurance training;Patient/family training;Equipment eval/education;Bed mobility;Gait training;Spoke to nursing;Family   Progress Progressing toward goals   PT Frequency 2-3x/wk   Discharge Recommendation   Rehab Resource Intensity Level, PT III (Minimum Resource Intensity)   AM-PAC Basic Mobility Inpatient   Turning in Flat Bed Without Bedrails 3   Lying on Back to Sitting on Edge of Flat Bed Without Bedrails 3   Moving Bed to Chair 3   Standing Up From Chair Using Arms 3   Walk in Room 3   Climb 3-5 Stairs With Railing 2   Basic Mobility Inpatient Raw Score 17   Basic Mobility Standardized Score 39.67   [facility] Highest Level Of Mobility   -HL Goal 5: Stand one or more mins   -HLM Achieved 8: Walk 250 feet ot more   Education   Education Provided Mobility training;Home exercise program;Assistive device   Patient Reinforcement needed;Demonstrates verbal understanding   End of Consult   Patient Position at End of Consult Bedside chair;Bed/Chair alarm activated;All needs within reach     Frannie Brown, PTA

## 2024-09-06 NOTE — NURSING NOTE
Attempting to have patient off of B/L soft leg restraints as patient overall calm and not moving legs excessively. 1:1 sitter Payal and RN removed B/L LE leg restraints without issue. Will continue to monitor and take off restraints as appropriate.

## 2024-09-06 NOTE — PLAN OF CARE
Problem: PHYSICAL THERAPY ADULT  Goal: Performs mobility at highest level of function for planned discharge setting.  See evaluation for individualized goals.  Description: Treatment/Interventions: Functional transfer training, LE strengthening/ROM, Therapeutic exercise, Endurance training, Patient/family training, Equipment eval/education, Bed mobility, Gait training, Spoke to nursing, Family          See flowsheet documentation for full assessment, interventions and recommendations.  Outcome: Progressing  Note: Prognosis: Good  Problem List: Decreased strength, Decreased endurance, Impaired balance, Decreased mobility, Decreased cognition, Impaired judgement, Decreased safety awareness  Assessment: Pt seen for PT treatment session this date with interventions consisting of bed mobility, transfer training, gait training, and HEP, and education provided as needed for safety and direction to improve functional mobility, safety awareness, and activity tolerance. Pt agreeable to PT treatment session upon arrival, pt found supine in bed . At end of session, pt left  seated out of bed in recliner with all needs in reach. In comparison to previous session, pt with improvement in activity tolerance, endurance, ambulation distances, and functional mobility. Pt  is showing progression toward PT goals with improvements as noted.  Pt is requiring less assistance for bed mobility,  min assist x1 for transfers and ambulation on levels with use of Rw.  Pt  requires assistance for steering of walker and management due to deviation from path toward the R and navigation around obstacles in path.  Pt  progressed with ambulation distances to 250'. No gross lob noted.  No leahy noted on 4L o2 nc, tolerated well.   Pt  performs seated b/l le arom and isometric exercises with visual, tactile and verbal cues for correct performance and techniques. Pt tolerated well.  Recommend out of bed for meals and use of bathroom for toileting.   Continue to recommend  level III minimal rehab resource intensity  at time of d/c in order to maximize pt's functional independence and safety w/ mobility. Pt continues to be functioning below baseline level. PT will continue to see pt while here in order to address the deficits listed above and provide interventions consistent w/ POC in effort to achieve STGs.    The patient's AM-PAC Basic Mobility Inpatient Short Form Raw Score is 17. A raw score greater than 16 suggests the patient may benefit from discharge to home. Please also refer to the recommendation of the Physical Therapist for safe discharge planning.  Barriers to Discharge: None (based on information from the chart)     Rehab Resource Intensity Level, PT: III (Minimum Resource Intensity)    See flowsheet documentation for full assessment.

## 2024-09-06 NOTE — RESPIRATORY THERAPY NOTE
RT tried to place pt on bipap, w Dr. Mooney's bipap orders of 16/8 and rate 22 w a nasal mask. PCA and family member helped to place pt on mask. Pt was combative, spitting and grabbing at RT. Pt was aggressively talking to family member as well. Spoke to RN and SLIM. Pt is off bipap, placed back on NC.

## 2024-09-06 NOTE — NURSING NOTE
Patient taken off restraints in all extremities. Patient resting in bed with 1:1 place.    Ortiz Moore 9/6/2024 11:59 AM

## 2024-09-06 NOTE — PLAN OF CARE
Problem: PAIN - ADULT  Goal: Verbalizes/displays adequate comfort level or baseline comfort level  Description: Interventions:  - Encourage patient to monitor pain and request assistance  - Assess pain using appropriate pain scale  - Administer analgesics based on type and severity of pain and evaluate response  - Implement non-pharmacological measures as appropriate and evaluate response  - Consider cultural and social influences on pain and pain management  - Notify physician/advanced practitioner if interventions unsuccessful or patient reports new pain  Outcome: Progressing     Problem: INFECTION - ADULT  Goal: Absence or prevention of progression during hospitalization  Description: INTERVENTIONS:  - Assess and monitor for signs and symptoms of infection  - Monitor lab/diagnostic results  - Monitor all insertion sites, i.e. indwelling lines, tubes, and drains  - Monitor endotracheal if appropriate and nasal secretions for changes in amount and color  - [place] appropriate cooling/warming therapies per order  - Administer medications as ordered  - Instruct and encourage patient and family to use good hand hygiene technique  - Identify and instruct in appropriate isolation precautions for identified infection/condition  Outcome: Progressing     Problem: SAFETY ADULT  Goal: Patient will remain free of falls  Description: INTERVENTIONS:  - Educate patient/family on patient safety including physical limitations  - Instruct patient to call for assistance with activity   - Consult OT/PT to assist with strengthening/mobility   - Keep Call bell within reach  - Keep bed low and locked with side rails adjusted as appropriate  - Keep care items and personal belongings within reach  - Initiate and maintain comfort rounds  - Make Fall Risk Sign visible to staff  - Offer Toileting every 2 Hours, in advance of need  - Initiate/Maintain bed alarm  - Obtain necessary fall risk management equipment: walker  - Apply yellow  socks and bracelet for high fall risk patients  - Consider moving patient to room near nurses station  Outcome: Progressing  Goal: Maintain or return to baseline ADL function  Description: INTERVENTIONS:  -  Assess patient's ability to carry out ADLs; assess patient's baseline for ADL function and identify physical deficits which impact ability to perform ADLs (bathing, care of mouth/teeth, toileting, grooming, dressing, etc.)  - Assess/evaluate cause of self-care deficits   - Assess range of motion  - Assess patient's mobility; develop plan if impaired  - Assess patient's need for assistive devices and provide as appropriate  - Encourage maximum independence but intervene and supervise when necessary  - Involve family in performance of ADLs  - Assess for home care needs following discharge   - Consider OT consult to assist with ADL evaluation and planning for discharge  - Provide patient education as appropriate  Outcome: Progressing  Goal: Maintains/Returns to pre admission functional level  Description: INTERVENTIONS:  - Perform AM-PAC 6 Click Basic Mobility/ Daily Activity assessment daily.  - Set and communicate daily mobility goal to care team and patient/family/caregiver.   - Collaborate with rehabilitation services on mobility goals if consulted  - Out of bed for toileting  - Record patient progress and toleration of activity level   Outcome: Progressing     Problem: DISCHARGE PLANNING  Goal: Discharge to home or other facility with appropriate resources  Description: INTERVENTIONS:  - Identify barriers to discharge w/patient and caregiver  - Arrange for needed discharge resources and transportation as appropriate  - Identify discharge learning needs (meds, wound care, etc.)  - Arrange for interpretive services to assist at discharge as needed  - Refer to Case Management Department for coordinating discharge planning if the patient needs post-hospital services based on physician/advanced practitioner order  or complex needs related to functional status, cognitive ability, or social support system  Outcome: Progressing     Problem: Knowledge Deficit  Goal: Patient/family/caregiver demonstrates understanding of disease process, treatment plan, medications, and discharge instructions  Description: Complete learning assessment and assess knowledge base.  Interventions:  - Provide teaching at level of understanding  - Provide teaching via preferred learning methods  Outcome: Progressing     Problem: Prexisting or High Potential for Compromised Skin Integrity  Goal: Skin integrity is maintained or improved  Description: INTERVENTIONS:  - Identify patients at risk for skin breakdown  - Assess and monitor skin integrity  - Assess and monitor nutrition and hydration status  - Monitor labs   - Assess for incontinence   - Turn and reposition patient  - Assist with mobility/ambulation  - Relieve pressure over bony prominences  - Avoid friction and shearing  - Provide appropriate hygiene as needed including keeping skin clean and dry  - Evaluate need for skin moisturizer/barrier cream  - Collaborate with interdisciplinary team   - Patient/family teaching  - Consider wound care consult   Outcome: Progressing     Problem: CONFUSION/THOUGHT DISTURBANCE  Goal: Thought disturbances (confusion, delirium, depression, dementia or psychosis) are managed to maintain or return to baseline mental status and functional level  Description: INTERVENTIONS:  - Assess for possible contributors to  thought disturbance, including but not limited to medications, infection, impaired vision or hearing, underlying metabolic abnormalities, dehydration, respiratory compromise,  psychiatric diagnoses and notify attending PHYSICAN/AP  - Monitor and intervene to maintain adequate nutrition, hydration, elimination, sleep and activity  - Decrease environmental stimuli, including noise as appropriate.  - Provide frequent contacts to provide refocusing, direction  and reassurance as needed. Approach patient calmly with eye contact and at their level.  - [place] high risk fall precautions, aspiration precautions and other safety measures, as indicated  - If delirium suspected, notify physician/AP of change in condition and request immediate in-person evaluation  - Pursue consults as appropriate including Geriatric (campus dependent), OT for cognitive evaluation/activity planning, psychiatric, pastoral care, etc.  Outcome: Progressing     Problem: SAFETY,RESTRAINT: NV/NON-SELF DESTRUCTIVE BEHAVIOR  Goal: Remains free of harm/injury (restraint for non violent/non self-detsructive behavior)  Description: INTERVENTIONS:  - Instruct patient/family regarding restraint use   - Assess and monitor physiologic and psychological status   - Provide interventions and comfort measures to meet assessed patient needs   - Identify and implement measures to help patient regain control  - Assess readiness for release of restraint   Outcome: Progressing  Goal: Returns to optimal restraint-free functioning  Description: INTERVENTIONS:  - Assess the patient's behavior and symptoms that indicate continued need for restraint  - Identify and implement measures to help patient regain control  - Assess readiness for release of restraint   Outcome: Progressing

## 2024-09-07 VITALS
SYSTOLIC BLOOD PRESSURE: 166 MMHG | DIASTOLIC BLOOD PRESSURE: 90 MMHG | HEART RATE: 89 BPM | WEIGHT: 130.51 LBS | HEIGHT: 63 IN | BODY MASS INDEX: 23.12 KG/M2 | OXYGEN SATURATION: 99 % | RESPIRATION RATE: 24 BRPM | TEMPERATURE: 98.4 F

## 2024-09-07 LAB
ANION GAP SERPL CALCULATED.3IONS-SCNC: 1 MMOL/L (ref 4–13)
BASE EX.OXY STD BLDV CALC-SCNC: 89.9 % (ref 60–80)
BASE EXCESS BLDV CALC-SCNC: 14.9 MMOL/L
BASOPHILS # BLD AUTO: 0.01 THOUSANDS/ÂΜL (ref 0–0.1)
BASOPHILS NFR BLD AUTO: 0 % (ref 0–1)
BUN SERPL-MCNC: 26 MG/DL (ref 5–25)
CALCIUM SERPL-MCNC: 9.4 MG/DL (ref 8.4–10.2)
CHLORIDE SERPL-SCNC: 98 MMOL/L (ref 96–108)
CO2 SERPL-SCNC: 44 MMOL/L (ref 21–32)
CREAT SERPL-MCNC: 0.83 MG/DL (ref 0.6–1.3)
EOSINOPHIL # BLD AUTO: 0.49 THOUSAND/ÂΜL (ref 0–0.61)
EOSINOPHIL NFR BLD AUTO: 6 % (ref 0–6)
ERYTHROCYTE [DISTWIDTH] IN BLOOD BY AUTOMATED COUNT: 13.3 % (ref 11.6–15.1)
GFR SERPL CREATININE-BSD FRML MDRD: 79 ML/MIN/1.73SQ M
GLUCOSE SERPL-MCNC: 115 MG/DL (ref 65–140)
GLUCOSE SERPL-MCNC: 118 MG/DL (ref 65–140)
GLUCOSE SERPL-MCNC: 259 MG/DL (ref 65–140)
HCO3 BLDV-SCNC: 42.1 MMOL/L (ref 24–30)
HCT VFR BLD AUTO: 38.5 % (ref 36.5–49.3)
HGB BLD-MCNC: 11.4 G/DL (ref 12–17)
IMM GRANULOCYTES # BLD AUTO: 0.03 THOUSAND/UL (ref 0–0.2)
IMM GRANULOCYTES NFR BLD AUTO: 0 % (ref 0–2)
LYMPHOCYTES # BLD AUTO: 1.45 THOUSANDS/ÂΜL (ref 0.6–4.47)
LYMPHOCYTES NFR BLD AUTO: 17 % (ref 14–44)
MCH RBC QN AUTO: 31.5 PG (ref 26.8–34.3)
MCHC RBC AUTO-ENTMCNC: 29.6 G/DL (ref 31.4–37.4)
MCV RBC AUTO: 106 FL (ref 82–98)
MONOCYTES # BLD AUTO: 1.16 THOUSAND/ÂΜL (ref 0.17–1.22)
MONOCYTES NFR BLD AUTO: 14 % (ref 4–12)
NEUTROPHILS # BLD AUTO: 5.24 THOUSANDS/ÂΜL (ref 1.85–7.62)
NEUTS SEG NFR BLD AUTO: 63 % (ref 43–75)
NRBC BLD AUTO-RTO: 0 /100 WBCS
O2 CT BLDV-SCNC: 15.7 ML/DL
PCO2 BLDV: 65.2 MM HG (ref 42–50)
PH BLDV: 7.43 [PH] (ref 7.3–7.4)
PLATELET # BLD AUTO: 136 THOUSANDS/UL (ref 149–390)
PMV BLD AUTO: 9.4 FL (ref 8.9–12.7)
PO2 BLDV: 64.1 MM HG (ref 35–45)
POTASSIUM SERPL-SCNC: 4 MMOL/L (ref 3.5–5.3)
RBC # BLD AUTO: 3.62 MILLION/UL (ref 3.88–5.62)
SODIUM SERPL-SCNC: 143 MMOL/L (ref 135–147)
WBC # BLD AUTO: 8.38 THOUSAND/UL (ref 4.31–10.16)

## 2024-09-07 PROCEDURE — 99239 HOSP IP/OBS DSCHRG MGMT >30: CPT | Performed by: INTERNAL MEDICINE

## 2024-09-07 PROCEDURE — 85025 COMPLETE CBC W/AUTO DIFF WBC: CPT | Performed by: INTERNAL MEDICINE

## 2024-09-07 PROCEDURE — 82948 REAGENT STRIP/BLOOD GLUCOSE: CPT

## 2024-09-07 PROCEDURE — 99232 SBSQ HOSP IP/OBS MODERATE 35: CPT | Performed by: INTERNAL MEDICINE

## 2024-09-07 PROCEDURE — 94660 CPAP INITIATION&MGMT: CPT

## 2024-09-07 PROCEDURE — 94640 AIRWAY INHALATION TREATMENT: CPT

## 2024-09-07 PROCEDURE — 82805 BLOOD GASES W/O2 SATURATION: CPT | Performed by: INTERNAL MEDICINE

## 2024-09-07 PROCEDURE — 94760 N-INVAS EAR/PLS OXIMETRY 1: CPT

## 2024-09-07 PROCEDURE — NC001 PR NO CHARGE: Performed by: INTERNAL MEDICINE

## 2024-09-07 PROCEDURE — 80048 BASIC METABOLIC PNL TOTAL CA: CPT | Performed by: INTERNAL MEDICINE

## 2024-09-07 RX ORDER — DOXYCYCLINE 100 MG/1
100 TABLET ORAL 2 TIMES DAILY
Qty: 2 TABLET | Refills: 0 | Status: SHIPPED | OUTPATIENT
Start: 2024-09-07 | End: 2024-09-08

## 2024-09-07 RX ADMIN — INSULIN LISPRO 2 UNITS: 100 INJECTION, SOLUTION INTRAVENOUS; SUBCUTANEOUS at 12:34

## 2024-09-07 RX ADMIN — PANTOPRAZOLE SODIUM 40 MG: 40 TABLET, DELAYED RELEASE ORAL at 05:09

## 2024-09-07 RX ADMIN — GUAIFENESIN 600 MG: 600 TABLET, EXTENDED RELEASE ORAL at 08:16

## 2024-09-07 RX ADMIN — IPRATROPIUM BROMIDE 0.5 MG: 0.5 SOLUTION RESPIRATORY (INHALATION) at 07:08

## 2024-09-07 RX ADMIN — FORMOTEROL FUMARATE DIHYDRATE 20 MCG: 20 SOLUTION RESPIRATORY (INHALATION) at 07:08

## 2024-09-07 RX ADMIN — ENOXAPARIN SODIUM 40 MG: 40 INJECTION SUBCUTANEOUS at 08:16

## 2024-09-07 RX ADMIN — TORSEMIDE 5 MG: 10 TABLET ORAL at 08:16

## 2024-09-07 RX ADMIN — LOSARTAN POTASSIUM 25 MG: 25 TABLET, FILM COATED ORAL at 08:16

## 2024-09-07 RX ADMIN — BUDESONIDE 0.5 MG: 0.5 INHALANT ORAL at 07:08

## 2024-09-07 RX ADMIN — SENNOSIDES AND DOCUSATE SODIUM 1 TABLET: 50; 8.6 TABLET ORAL at 08:17

## 2024-09-07 RX ADMIN — IPRATROPIUM BROMIDE 0.5 MG: 0.5 SOLUTION RESPIRATORY (INHALATION) at 11:38

## 2024-09-07 NOTE — ASSESSMENT & PLAN NOTE
Wt Readings from Last 3 Encounters:   09/07/24 59.2 kg (130 lb 8.2 oz)   08/13/24 58.1 kg (128 lb)   07/10/24 59 kg (130 lb)     Pt appears euvolemic on exam  BNP 38  Last ECHO 4/2023 EF 66% grade I diastolic dysfunction  Daily weights, I/O  Continue home torsemide

## 2024-09-07 NOTE — DISCHARGE SUMMARY
[facility]  Discharge- Severiano Feliciano 1935, 89 y.o. male MRN: [id number]  Unit/Bed#: [place] [address] -01 Encounter: [id number]  Primary Care Provider: Kirby Casanova MD   Date and time admitted to hospital: 9/3/2024  8:52 PM    * Acute on chronic respiratory failure with hypoxia and hypercapnia (HCC)  Assessment & Plan  Patient with PMHx of frequent COPD exacerbations secondary to severe COPD, chronic respiratory failure typically maintained on 3 L nasal cannula, HTN, and CHF presented to the ED secondary to increased SOB, wheezing, and confusion. Patient did initially require Bipap  Patient currently on 5 L oxygen, baseline is 3 L  CXR revealed moderate opacity in right base emphysema   Suspect acute resp failure most likely secondary to COPD exacerbation  Pulmonary input appreciated  Wife will call the DME on Monday to switch BiPAP mask from fullface to nasal, he will follow with pulmonary outpatient    COPD with acute exacerbation (HCC)  Assessment & Plan  Pt presented to the ED with shortness of breath and wheezing, improved somewhat following therapies  Suspect COPD exacerbation   Steroids were discontinued as likely causing confusion and agitation  Continue empiric antibiotics  Pulmonary input appreciated, outpatient follow-up      SIRS (systemic inflammatory response syndrome) (HCC)  Assessment & Plan  Pt fulfilling SIRS with tachycardia and tachypnea  CXR reveals increased density of right lung base  Urine Legionella, strep pneumo negative  Discharge home on doxycycline to complete course    Chronic diastolic (congestive) heart failure (HCC)  Assessment & Plan  Wt Readings from Last 3 Encounters:   09/07/24 59.2 kg (130 lb 8.2 oz)   08/13/24 58.1 kg (128 lb)   07/10/24 59 kg (130 lb)     Pt appears euvolemic on exam  BNP 36  Last ECHO 4/2023 EF 66% grade I diastolic dysfunction  Continue home torsemide    Acute metabolic encephalopathy  Assessment & Plan  Encephalopathy  likely secondary to COPD exacerbation, pneumonia, steroids  Patient is back to baseline mental status  Benign essential hypertension  Assessment & Plan  Continue losartan and torsemide        Transition of Care Discharge Summary - [facility]    Patient Information: Severiano Feliciano 89 y.o. male MRN: [id number]  Unit/Bed#: [place] [address] -01 Encounter: [id number]    Discharging Physician / Practitioner: Marija Khan MD  PCP: Kirby Casanova MD  Admission Date: 9/3/2024  Discharge Date: 09/07/24    Disposition:      Other: home      Reason for Admission: Acute on chronic hypoxic and hypercapnic respiratory failure    Discharge Diagnoses:     Principal Problem:    Acute on chronic respiratory failure with hypoxia and hypercapnia (HCC)  Active Problems:    COPD with acute exacerbation (HCC)    Benign essential hypertension    Acute metabolic encephalopathy    Chronic diastolic (congestive) heart failure (HCC)    SIRS (systemic inflammatory response syndrome) (HCC)  Resolved Problems:    * No resolved hospital problems. *      Consultations During Hospital Stay:  IP CONSULT TO PULMONOLOGY      Procedures Performed:     none    Medication Adjustments and Discharge Medications:  Medication Dosing Tapers - Please refer to Discharge Medication List for details on any medication dosing tapers (if applicable to patient).  Discharge Medication List: See after visit summary for reconciled discharge medications.     Wound Care Recommendations:  When applicable, please see wound care section of After Visit Summary.    Diet Recommendations at Discharge:  Diet -        Diet Orders   (From admission, onward)                 Start     Ordered    09/04/24 0326  Diet Dysphagia/Modified Consistency; Dysphagia 3-Dental Soft; Thin Liquid; Sodium 2 GM, Fluid Restriction 1800 ML  Diet effective now        References:    Adult Nutrition Support Algorithm    RD Therapeutic Diet Order Protocol   Question Answer Comment    Diet Type Dysphagia/Modified Consistency    Dysphagia/Modified Consistency Dysphagia 3-Dental Soft    Liquid Modifier Thin Liquid    Other Restriction(s): Sodium 2 GM    Other Restriction(s): Fluid Restriction 1800 ML    RD to adjust diet per protocol? Yes        09/04/24 0325                  Fluid Restriction - No Fluid Restriction at Discharge.      Significant Findings / Test Results:     XR chest portable    Result Date: 9/6/2024  Impression: Increased density of right lung base infiltrate suggesting worsening pneumonia. Questionable trace left effusion. Workstation performed: [id number]     XR chest 1 view portable    Result Date: 9/4/2024  Impression: Moderate opacity in the right base, question persistent/recurrent pneumonia as that is the site of pneumonia on the chest CT from 8/8/2024 versus postinfectious scar. Emphysema. Workstation performed: [id number]        Hospital Course:     Severiano Feliciano is a 89 y.o. male patient who originally presented to the hospital on 9/3/2024 due to acute on chronic hypoxic and hypercapnic respite failure secondary to COPD exacerbation.  Also had right sided pneumonia was eval by pulmonary treated with IV steroids however these were continued as patient was becoming more agitated and confused.  He was maintained on antibiotics and will be discharged home to complete 5-day course of antibiotics.  He is otherwise medically stable and cleared from pulmonary standpoint for discharge home today with outpatient follow-up.  Please see above problem list for further details.      Condition at Discharge: good     Discharge Day Visit / Exam:     Subjective: Patient seen and examined at bedside, denies any complaints, no events overnight.  As per family he is back to his baseline mental status.    Vitals: Blood Pressure: 166/90 (09/07/24 0709)  Pulse: 89 (09/07/24 0709)  Temperature: 98.4 °F (36.9 °C) (09/06/24 2054)  Temp Source: Oral (09/05/24 0727)  Respirations: (!) 24  "(09/07/24 0709)  Height: 5' 3\" (160 cm) (09/04/24 0333)  Weight - Scale: 59.2 kg (130 lb 8.2 oz) (09/07/24 0600)  SpO2: 99 % (09/07/24 0709)    Physical Exam:    Constitutional: Patient is in no acute distress  HEENT:  Normocephalic, atraumatic  Cardiovascular: Normal S1S2, RRR, No murmurs/rubs/gallops appreciated.  Pulmonary:  Bilateral air entry, No rhonchi/rales/wheezing appreciated  Abdominal: Soft, Bowel sounds present, Non-tender, Non-distended  Extremities:  No cyanosis, clubbing or edema.   Neurological: Awake, alert.     Discharge instructions/Information to patient and family:   See after visit summary section titled Discharge Instructions for information provided to patient and family.      Planned Readmission: no      Discharge Statement:  I spent 35 minutes discharging the patient. This time was spent on the day of discharge. I had direct contact with the patient on the day of discharge. Greater than 50% of the total time was spent examining patient, answering all patient questions, arranging and discussing plan of care with patient as well as directly providing post-discharge instructions.  Additional time then spent on discharge activities.    ** Please Note: This note has been constructed using a voice recognition system **                  "

## 2024-09-07 NOTE — PLAN OF CARE
Problem: PAIN - ADULT  Goal: Verbalizes/displays adequate comfort level or baseline comfort level  Description: Interventions:  - Encourage patient to monitor pain and request assistance  - Assess pain using appropriate pain scale  - Administer analgesics based on type and severity of pain and evaluate response  - Implement non-pharmacological measures as appropriate and evaluate response  - Consider cultural and social influences on pain and pain management  - Notify physician/advanced practitioner if interventions unsuccessful or patient reports new pain  Outcome: Progressing     Problem: INFECTION - ADULT  Goal: Absence or prevention of progression during hospitalization  Description: INTERVENTIONS:  - Assess and monitor for signs and symptoms of infection  - Monitor lab/diagnostic results  - Monitor all insertion sites, i.e. indwelling lines, tubes, and drains  - Monitor endotracheal if appropriate and nasal secretions for changes in amount and color  - [place] appropriate cooling/warming therapies per order  - Administer medications as ordered  - Instruct and encourage patient and family to use good hand hygiene technique  - Identify and instruct in appropriate isolation precautions for identified infection/condition  Outcome: Progressing     Problem: SAFETY ADULT  Goal: Patient will remain free of falls  Description: INTERVENTIONS:  - Educate patient/family on patient safety including physical limitations  - Instruct patient to call for assistance with activity   - Consult OT/PT to assist with strengthening/mobility   - Keep Call bell within reach  - Keep bed low and locked with side rails adjusted as appropriate  - Keep care items and personal belongings within reach  - Initiate and maintain comfort rounds  - Make Fall Risk Sign visible to staff  - Offer Toileting every 2 Hours, in advance of need  - Initiate/Maintain bed alarm  - Obtain necessary fall risk management equipment: walker  - Apply yellow  socks and bracelet for high fall risk patients  - Consider moving patient to room near nurses station  Outcome: Progressing  Goal: Maintain or return to baseline ADL function  Description: INTERVENTIONS:  -  Assess patient's ability to carry out ADLs; assess patient's baseline for ADL function and identify physical deficits which impact ability to perform ADLs (bathing, care of mouth/teeth, toileting, grooming, dressing, etc.)  - Assess/evaluate cause of self-care deficits   - Assess range of motion  - Assess patient's mobility; develop plan if impaired  - Assess patient's need for assistive devices and provide as appropriate  - Encourage maximum independence but intervene and supervise when necessary  - Involve family in performance of ADLs  - Assess for home care needs following discharge   - Consider OT consult to assist with ADL evaluation and planning for discharge  - Provide patient education as appropriate  Outcome: Progressing  Goal: Maintains/Returns to pre admission functional level  Description: INTERVENTIONS:  - Perform AM-PAC 6 Click Basic Mobility/ Daily Activity assessment daily.  - Set and communicate daily mobility goal to care team and patient/family/caregiver.   - Collaborate with rehabilitation services on mobility goals if consulted  - Record patient progress and toleration of activity level   Outcome: Progressing     Problem: DISCHARGE PLANNING  Goal: Discharge to home or other facility with appropriate resources  Description: INTERVENTIONS:  - Identify barriers to discharge w/patient and caregiver  - Arrange for needed discharge resources and transportation as appropriate  - Identify discharge learning needs (meds, wound care, etc.)  - Arrange for interpretive services to assist at discharge as needed  - Refer to Case Management Department for coordinating discharge planning if the patient needs post-hospital services based on physician/advanced practitioner order or complex needs related to  functional status, cognitive ability, or social support system  Outcome: Progressing     Problem: Knowledge Deficit  Goal: Patient/family/caregiver demonstrates understanding of disease process, treatment plan, medications, and discharge instructions  Description: Complete learning assessment and assess knowledge base.  Interventions:  - Provide teaching at level of understanding  - Provide teaching via preferred learning methods  Outcome: Progressing     Problem: Prexisting or High Potential for Compromised Skin Integrity  Goal: Skin integrity is maintained or improved  Description: INTERVENTIONS:  - Identify patients at risk for skin breakdown  - Assess and monitor skin integrity  - Assess and monitor nutrition and hydration status  - Monitor labs   - Assess for incontinence   - Turn and reposition patient  - Assist with mobility/ambulation  - Relieve pressure over bony prominences  - Avoid friction and shearing  - Provide appropriate hygiene as needed including keeping skin clean and dry  - Evaluate need for skin moisturizer/barrier cream  - Collaborate with interdisciplinary team   - Patient/family teaching  - Consider wound care consult   Outcome: Progressing     Problem: CONFUSION/THOUGHT DISTURBANCE  Goal: Thought disturbances (confusion, delirium, depression, dementia or psychosis) are managed to maintain or return to baseline mental status and functional level  Description: INTERVENTIONS:  - Assess for possible contributors to  thought disturbance, including but not limited to medications, infection, impaired vision or hearing, underlying metabolic abnormalities, dehydration, respiratory compromise,  psychiatric diagnoses and notify attending PHYSICAN/AP  - Monitor and intervene to maintain adequate nutrition, hydration, elimination, sleep and activity  - Decrease environmental stimuli, including noise as appropriate.  - Provide frequent contacts to provide refocusing, direction and reassurance as needed.  Approach patient calmly with eye contact and at their level.  - [place] high risk fall precautions, aspiration precautions and other safety measures, as indicated  - If delirium suspected, notify physician/AP of change in condition and request immediate in-person evaluation  - Pursue consults as appropriate including Geriatric (campus dependent), OT for cognitive evaluation/activity planning, psychiatric, pastoral care, etc.  Outcome: Progressing     Problem: SAFETY,RESTRAINT: NV/NON-SELF DESTRUCTIVE BEHAVIOR  Goal: Remains free of harm/injury (restraint for non violent/non self-detsructive behavior)  Description: INTERVENTIONS:  - Instruct patient/family regarding restraint use   - Assess and monitor physiologic and psychological status   - Provide interventions and comfort measures to meet assessed patient needs   - Identify and implement measures to help patient regain control  - Assess readiness for release of restraint   Outcome: Progressing  Goal: Returns to optimal restraint-free functioning  Description: INTERVENTIONS:  - Assess the patient's behavior and symptoms that indicate continued need for restraint  - Identify and implement measures to help patient regain control  - Assess readiness for release of restraint   Outcome: Progressing

## 2024-09-07 NOTE — PLAN OF CARE
Problem: PAIN - ADULT  Goal: Verbalizes/displays adequate comfort level or baseline comfort level  Description: Interventions:  - Encourage patient to monitor pain and request assistance  - Assess pain using appropriate pain scale  - Administer analgesics based on type and severity of pain and evaluate response  - Implement non-pharmacological measures as appropriate and evaluate response  - Consider cultural and social influences on pain and pain management  - Notify physician/advanced practitioner if interventions unsuccessful or patient reports new pain  Outcome: Progressing     Problem: INFECTION - ADULT  Goal: Absence or prevention of progression during hospitalization  Description: INTERVENTIONS:  - Assess and monitor for signs and symptoms of infection  - Monitor lab/diagnostic results  - Monitor all insertion sites, i.e. indwelling lines, tubes, and drains  - Monitor endotracheal if appropriate and nasal secretions for changes in amount and color  - [place] appropriate cooling/warming therapies per order  - Administer medications as ordered  - Instruct and encourage patient and family to use good hand hygiene technique  - Identify and instruct in appropriate isolation precautions for identified infection/condition  Outcome: Progressing     Problem: SAFETY ADULT  Goal: Patient will remain free of falls  Description: INTERVENTIONS:  - Educate patient/family on patient safety including physical limitations  - Instruct patient to call for assistance with activity   - Consult OT/PT to assist with strengthening/mobility   - Keep Call bell within reach  - Keep bed low and locked with side rails adjusted as appropriate  - Keep care items and personal belongings within reach  - Initiate and maintain comfort rounds  - Make Fall Risk Sign visible to staff  - Offer Toileting every 2 Hours, in advance of need  - Initiate/Maintain bed alarm  - Obtain necessary fall risk management equipment: walker  - Apply yellow  socks and bracelet for high fall risk patients  - Consider moving patient to room near nurses station  Outcome: Progressing  Goal: Maintain or return to baseline ADL function  Description: INTERVENTIONS:  -  Assess patient's ability to carry out ADLs; assess patient's baseline for ADL function and identify physical deficits which impact ability to perform ADLs (bathing, care of mouth/teeth, toileting, grooming, dressing, etc.)  - Assess/evaluate cause of self-care deficits   - Assess range of motion  - Assess patient's mobility; develop plan if impaired  - Assess patient's need for assistive devices and provide as appropriate  - Encourage maximum independence but intervene and supervise when necessary  - Involve family in performance of ADLs  - Assess for home care needs following discharge   - Consider OT consult to assist with ADL evaluation and planning for discharge  - Provide patient education as appropriate  Outcome: Progressing  Goal: Maintains/Returns to pre admission functional level  Description: INTERVENTIONS:  - Perform AM-PAC 6 Click Basic Mobility/ Daily Activity assessment daily.  - Set and communicate daily mobility goal to care team and patient/family/caregiver.   - Collaborate with rehabilitation services on mobility goals if consulted  - Perform Range of Motion  times a day.  - Reposition patient every  hours.  - Dangle patient  times a day  - Stand patient  times a day  - Ambulate patient  times a day  - Out of bed to chair  times a day   - Out of bed for meals times a day  - Out of bed for toileting  - Record patient progress and toleration of activity level   Outcome: Progressing     Problem: DISCHARGE PLANNING  Goal: Discharge to home or other facility with appropriate resources  Description: INTERVENTIONS:  - Identify barriers to discharge w/patient and caregiver  - Arrange for needed discharge resources and transportation as appropriate  - Identify discharge learning needs (meds, wound care,  etc.)  - Arrange for interpretive services to assist at discharge as needed  - Refer to Case Management Department for coordinating discharge planning if the patient needs post-hospital services based on physician/advanced practitioner order or complex needs related to functional status, cognitive ability, or social support system  Outcome: Progressing     Problem: Prexisting or High Potential for Compromised Skin Integrity  Goal: Skin integrity is maintained or improved  Description: INTERVENTIONS:  - Identify patients at risk for skin breakdown  - Assess and monitor skin integrity  - Assess and monitor nutrition and hydration status  - Monitor labs   - Assess for incontinence   - Turn and reposition patient  - Assist with mobility/ambulation  - Relieve pressure over bony prominences  - Avoid friction and shearing  - Provide appropriate hygiene as needed including keeping skin clean and dry  - Evaluate need for skin moisturizer/barrier cream  - Collaborate with interdisciplinary team   - Patient/family teaching  - Consider wound care consult   Outcome: Progressing     Problem: CONFUSION/THOUGHT DISTURBANCE  Goal: Thought disturbances (confusion, delirium, depression, dementia or psychosis) are managed to maintain or return to baseline mental status and functional level  Description: INTERVENTIONS:  - Assess for possible contributors to  thought disturbance, including but not limited to medications, infection, impaired vision or hearing, underlying metabolic abnormalities, dehydration, respiratory compromise,  psychiatric diagnoses and notify attending PHYSICAN/AP  - Monitor and intervene to maintain adequate nutrition, hydration, elimination, sleep and activity  - Decrease environmental stimuli, including noise as appropriate.  - Provide frequent contacts to provide refocusing, direction and reassurance as needed. Approach patient calmly with eye contact and at their level.  - [place] high risk fall precautions,  aspiration precautions and other safety measures, as indicated  - If delirium suspected, notify physician/AP of change in condition and request immediate in-person evaluation  - Pursue consults as appropriate including Geriatric (campus dependent), OT for cognitive evaluation/activity planning, psychiatric, pastoral care, etc.  Outcome: Progressing     Problem: SAFETY,RESTRAINT: NV/NON-SELF DESTRUCTIVE BEHAVIOR  Goal: Remains free of harm/injury (restraint for non violent/non self-detsructive behavior)  Description: INTERVENTIONS:  - Instruct patient/family regarding restraint use   - Assess and monitor physiologic and psychological status   - Provide interventions and comfort measures to meet assessed patient needs   - Identify and implement measures to help patient regain control  - Assess readiness for release of restraint   Outcome: Progressing  Goal: Returns to optimal restraint-free functioning  Description: INTERVENTIONS:  - Assess the patient's behavior and symptoms that indicate continued need for restraint  - Identify and implement measures to help patient regain control  - Assess readiness for release of restraint   Outcome: Progressing

## 2024-09-07 NOTE — ASSESSMENT & PLAN NOTE
Patient with PMHx of frequent COPD exacerbations secondary to severe COPD, chronic respiratory failure typically maintained on 3 L nasal cannula, HTN, and CHF presented to the ED secondary to increased SOB, wheezing, and confusion. Patient did initially require Bipap  Patient currently on 5 L oxygen, baseline is 3 L  CXR revealed moderate opacity in right base emphysema   Suspect acute resp failure most likely secondary to COPD exacerbation  Pulmonary input appreciated  Resp protocol  Ween oxygen as tolerated

## 2024-09-07 NOTE — PLAN OF CARE
Problem: PAIN - ADULT  Goal: Verbalizes/displays adequate comfort level or baseline comfort level  Description: Interventions:  - Encourage patient to monitor pain and request assistance  - Assess pain using appropriate pain scale  - Administer analgesics based on type and severity of pain and evaluate response  - Implement non-pharmacological measures as appropriate and evaluate response  - Consider cultural and social influences on pain and pain management  - Notify physician/advanced practitioner if interventions unsuccessful or patient reports new pain  9/7/2024 1345 by Ortiz Moore RN  Outcome: Adequate for Discharge  9/7/2024 0942 by Ortiz Moore RN  Outcome: Progressing     Problem: INFECTION - ADULT  Goal: Absence or prevention of progression during hospitalization  Description: INTERVENTIONS:  - Assess and monitor for signs and symptoms of infection  - Monitor lab/diagnostic results  - Monitor all insertion sites, i.e. indwelling lines, tubes, and drains  - Monitor endotracheal if appropriate and nasal secretions for changes in amount and color  - [place] appropriate cooling/warming therapies per order  - Administer medications as ordered  - Instruct and encourage patient and family to use good hand hygiene technique  - Identify and instruct in appropriate isolation precautions for identified infection/condition  9/7/2024 1345 by Ortiz Moore RN  Outcome: Adequate for Discharge  9/7/2024 0942 by Ortiz Moore RN  Outcome: Progressing     Problem: SAFETY ADULT  Goal: Patient will remain free of falls  Description: INTERVENTIONS:  - Educate patient/family on patient safety including physical limitations  - Instruct patient to call for assistance with activity   - Consult OT/PT to assist with strengthening/mobility   - Keep Call bell within reach  - Keep bed low and locked with side rails adjusted as appropriate  - Keep care items and personal belongings within reach  - Initiate and maintain  comfort rounds  - Make Fall Risk Sign visible to staff  - Offer Toileting every 2 Hours, in advance of need  - Initiate/Maintain bed alarm  - Obtain necessary fall risk management equipment: walker  - Apply yellow socks and bracelet for high fall risk patients  - Consider moving patient to room near nurses station  9/7/2024 1345 by Ortiz Moore RN  Outcome: Adequate for Discharge  9/7/2024 0942 by Ortiz Moore RN  Outcome: Progressing  Goal: Maintain or return to baseline ADL function  Description: INTERVENTIONS:  -  Assess patient's ability to carry out ADLs; assess patient's baseline for ADL function and identify physical deficits which impact ability to perform ADLs (bathing, care of mouth/teeth, toileting, grooming, dressing, etc.)  - Assess/evaluate cause of self-care deficits   - Assess range of motion  - Assess patient's mobility; develop plan if impaired  - Assess patient's need for assistive devices and provide as appropriate  - Encourage maximum independence but intervene and supervise when necessary  - Involve family in performance of ADLs  - Assess for home care needs following discharge   - Consider OT consult to assist with ADL evaluation and planning for discharge  - Provide patient education as appropriate  9/7/2024 1345 by Ortiz Moore RN  Outcome: Adequate for Discharge  9/7/2024 0942 by Ortiz Moore RN  Outcome: Progressing  Goal: Maintains/Returns to pre admission functional level  Description: INTERVENTIONS:  - Perform AM-PAC 6 Click Basic Mobility/ Daily Activity assessment daily.  - Set and communicate daily mobility goal to care team and patient/family/caregiver.   - Collaborate with rehabilitation services on mobility goals if consulted  - Out of bed for toileting  - Record patient progress and toleration of activity level   9/7/2024 1345 by Ortiz Moore RN  Outcome: Adequate for Discharge  9/7/2024 0942 by Ortiz Moore RN  Outcome: Progressing     Problem: DISCHARGE  PLANNING  Goal: Discharge to home or other facility with appropriate resources  Description: INTERVENTIONS:  - Identify barriers to discharge w/patient and caregiver  - Arrange for needed discharge resources and transportation as appropriate  - Identify discharge learning needs (meds, wound care, etc.)  - Arrange for interpretive services to assist at discharge as needed  - Refer to Case Management Department for coordinating discharge planning if the patient needs post-hospital services based on physician/advanced practitioner order or complex needs related to functional status, cognitive ability, or social support system  9/7/2024 1345 by Ortiz Moore RN  Outcome: Adequate for Discharge  9/7/2024 0942 by Ortiz Moore RN  Outcome: Progressing     Problem: Knowledge Deficit  Goal: Patient/family/caregiver demonstrates understanding of disease process, treatment plan, medications, and discharge instructions  Description: Complete learning assessment and assess knowledge base.  Interventions:  - Provide teaching at level of understanding  - Provide teaching via preferred learning methods  9/7/2024 1345 by Ortiz Moore RN  Outcome: Adequate for Discharge  9/7/2024 0942 by Ortiz Moore RN  Outcome: Progressing     Problem: Prexisting or High Potential for Compromised Skin Integrity  Goal: Skin integrity is maintained or improved  Description: INTERVENTIONS:  - Identify patients at risk for skin breakdown  - Assess and monitor skin integrity  - Assess and monitor nutrition and hydration status  - Monitor labs   - Assess for incontinence   - Turn and reposition patient  - Assist with mobility/ambulation  - Relieve pressure over bony prominences  - Avoid friction and shearing  - Provide appropriate hygiene as needed including keeping skin clean and dry  - Evaluate need for skin moisturizer/barrier cream  - Collaborate with interdisciplinary team   - Patient/family teaching  - Consider wound care consult    9/7/2024 1345 by Ortiz Moore RN  Outcome: Adequate for Discharge  9/7/2024 0942 by Ortiz Moore RN  Outcome: Progressing     Problem: CONFUSION/THOUGHT DISTURBANCE  Goal: Thought disturbances (confusion, delirium, depression, dementia or psychosis) are managed to maintain or return to baseline mental status and functional level  Description: INTERVENTIONS:  - Assess for possible contributors to  thought disturbance, including but not limited to medications, infection, impaired vision or hearing, underlying metabolic abnormalities, dehydration, respiratory compromise,  psychiatric diagnoses and notify attending PHYSICAN/AP  - Monitor and intervene to maintain adequate nutrition, hydration, elimination, sleep and activity  - Decrease environmental stimuli, including noise as appropriate.  - Provide frequent contacts to provide refocusing, direction and reassurance as needed. Approach patient calmly with eye contact and at their level.  - [place] high risk fall precautions, aspiration precautions and other safety measures, as indicated  - If delirium suspected, notify physician/AP of change in condition and request immediate in-person evaluation  - Pursue consults as appropriate including Geriatric (campus dependent), OT for cognitive evaluation/activity planning, psychiatric, pastoral care, etc.  9/7/2024 1345 by Ortiz Moore RN  Outcome: Adequate for Discharge  9/7/2024 0942 by Ortiz Moore RN  Outcome: Progressing     Problem: SAFETY,RESTRAINT: NV/NON-SELF DESTRUCTIVE BEHAVIOR  Goal: Remains free of harm/injury (restraint for non violent/non self-detsructive behavior)  Description: INTERVENTIONS:  - Instruct patient/family regarding restraint use   - Assess and monitor physiologic and psychological status   - Provide interventions and comfort measures to meet assessed patient needs   - Identify and implement measures to help patient regain control  - Assess readiness for release of restraint    9/7/2024 1345 by Ortiz Moore RN  Outcome: Adequate for Discharge  9/7/2024 0942 by Ortiz Moore RN  Outcome: Progressing  Goal: Returns to optimal restraint-free functioning  Description: INTERVENTIONS:  - Assess the patient's behavior and symptoms that indicate continued need for restraint  - Identify and implement measures to help patient regain control  - Assess readiness for release of restraint   9/7/2024 1345 by Ortiz Moore RN  Outcome: Adequate for Discharge  9/7/2024 0942 by Ortiz Moore RN  Outcome: Progressing

## 2024-09-07 NOTE — NURSING NOTE
Assumed care of pt at this time. Agree with previous nurse's assessment. 1 to 1 at bedside. Pt resting quietly with wife also at bedside. Call bell within reach, needs within reach. Pt sleeping calmly at this time.

## 2024-09-07 NOTE — NURSING NOTE
Patient discharged 9/7/2024 2:33 PM. AVS and discharge instructions reviewed with spouse. All questions answered. Family verbalized having all belongings for discharge. Patient taken to exit by spouse and PCA to be taken home.    Ortiz Moore 9/7/2024 2:34 PM

## 2024-09-07 NOTE — PROGRESS NOTES
Based on his experience with BiPAP in the hospital and blood gas trends.  He probably needs a pressure support of 8 cm H2O.  I have gone on [employer] and changed his BiPAP settings to max IPAP 16, minimum EPAP 4, pressure support 8.     Previous settings were max IPAP 12, minimum EPAP 5, pressure support 4.

## 2024-09-07 NOTE — PROGRESS NOTES
Pulmonary Progress Note  Severiano Feliciano 89 y.o. male MRN: [id number]  Unit/Bed#: [place] [address] -01 Encounter: [id number]      Assessment:  Acute on chronic hypoxic and hypercapnic respiratory failure likely due to pneumonia  Severe COPD GOLD E with exacerbation  Chronic hypercapnic respiratory failure due to COPD not compliant with BiPAP (using less than an hour per night, does not like full facemask)  Encephalopathy likely due to steroid induced delirium  Abnormal chest x-ray with right basilar opacity  Community-acquired pneumonia  History of JAY not compliant with BiPAP     Plan:  -Now on 3L which is his baseline  -Due to delirium stopped systemic steroids  -Pulmonary toilet, mucociliary clearance, incentive spirometry  -Very noncompliant with BIPAP.  He has BIPAP at home and uses it sparingly.    -I asked his wife to call the DME on Monday and switch his BiPAP mask from fullface to nasal.  They can also take home the nasal masks that he is tolerating here in the hospital home  -VBG improved  -Can complete 5 days of Ceftriaxone  -Continue budesonide/Perforomist twice daily, Xopenex/Atrovent 3 times a day   -Home regimen-budesonide/Perforomist twice daily.  DuoNebs twice a day  -He follows with Dr. Hayward with pulmonary as an outpatient -I will message the office to set up follow-up    Subjective:  I spoke to his wife and the patient with one of our nursing staff who speaks Citizen of [place].  He is back to respiratory baseline.  His mental status is back to baseline.  He is eating well.  Back to 3 L.  He wore BiPAP for 2 hours overnight      Objective:  Vitals: Vitals personally reviewed  Vitals:    09/06/24 2046 09/06/24 2054 09/07/24 0600 09/07/24 0709   BP:  166/90  166/90   Pulse:  87  89   Resp:  20  (!) 24   Temp:  98.4 °F (36.9 °C)     TempSrc:       SpO2: 96% 95%  99%   Weight:   59.2 kg (130 lb 8.2 oz)    Height:          Body mass index is 23.12 kg/m².    Intake/Output Summary (Last 24 hours) at 9/7/2024  1235  Last data filed at 9/7/2024 0901  Gross per 24 hour   Intake 1320 ml   Output 200 ml   Net 1120 ml     Invasive Devices       Peripheral Intravenous Line  Duration             Peripheral IV 09/05/24 Dorsal (posterior);Right Forearm 1 day                    Physical Exam  General: Elderly frail sitting in chair no distress eating lunch  HEET: head NC/AT, neck supple    Cardiovascular:  Regular rhythm, +S1 S2  Respiratory: Mild end expiratory wheezing, not using accessory muscles  Abdomen: Soft, non distended  Extremities: No cyanosis.  no edema  Neuro: No focal deficits.  Alert and oriented.  Hard of hearing  Skin: Warm, dry    Labs: I have personally reviewed pertinent lab results.  Laboratory and Diagnostics  Results from last 7 days   Lab Units 09/07/24  0547 09/06/24  0607 09/05/24  1143 09/05/24  0437 09/04/24  0555 09/03/24  2114   WBC Thousand/uL 8.38 9.63  --  8.39 10.53* 9.67   HEMOGLOBIN g/dL 11.4* 11.3*  --  9.7* 10.7* 11.0*   I STAT HEMOGLOBIN g/dl  --   --  11.2*  --   --   --    HEMATOCRIT % 38.5 38.9  --  33.9* 37.9 39.0   HEMATOCRIT, ISTAT %  --   --  33*  --   --   --    PLATELETS Thousands/uL 136* 145*  --  117* 105* 125*   SEGS PCT % 63 64  --  87*  --  72   BANDS PCT %  --   --   --   --  13*  --    MONO PCT % 14* 13*  --  7 1* 14*   EOS PCT % 6 2  --  0 0 4     Results from last 7 days   Lab Units 09/07/24  0547 09/06/24  0607 09/05/24  1143 09/05/24  0437 09/04/24  0555 09/03/24  2114   SODIUM mmol/L 143 146  --  144 143 145   POTASSIUM mmol/L 4.0 4.5  --  5.0 4.8 4.4   CHLORIDE mmol/L 98 97  --  98 96 98   CO2 mmol/L 44* >45*  --  >45* 44* >45*   CO2, I-STAT mmol/L  --   --  >45*  --   --   --    ANION GAP mmol/L 1*  --   --   --  3*  --    BUN mg/dL 26* 35*  --  38* 28* 24   CREATININE mg/dL 0.83 0.92  --  1.07 0.91 0.82   CALCIUM mg/dL 9.4 9.6  --  9.2 9.4 9.3   GLUCOSE RANDOM mg/dL 115 93  --  215* 248* 168*   ALT U/L  --   --   --   --   --  24   AST U/L  --   --   --   --   --  30  "  ALK PHOS U/L  --   --   --   --   --  66   ALBUMIN g/dL  --   --   --   --   --  3.7   TOTAL BILIRUBIN mg/dL  --   --   --   --   --  0.41     Results from last 7 days   Lab Units 24  0555   MAGNESIUM mg/dL 2.7      Results from last 7 days   Lab Units 24  2114   LACTIC ACID mmol/L 1.3             Results from last 7 days   Lab Units 24  0607 24  0555 24  2114   PROCALCITONIN ng/ml 0.10 0.27* 0.08         AB.43/65    Imaging and other studies: I have personally reviewed pertinent films in PACS   CXR - r basilar infiltrate      Code Status: Level 1 - Full Code    Francisco Mooney MD  Pulmonary, Critical Care and Sleep Medicine  [facility]     Portions of the record may have been created with voice recognition software. Occasional wrong word or \"sound a like\" substitutions may have occurred due to the inherent limitations of voice recognition software. Please read the chart carefully and recognize, using context, where substitutions have occurred.     "

## 2024-09-08 LAB
BACTERIA BLD CULT: NORMAL
BACTERIA BLD CULT: NORMAL

## 2024-09-09 ENCOUNTER — NURSE TRIAGE (OUTPATIENT)
Dept: OBGYN CLINIC | Facility: CLINIC | Age: 89
End: 2024-09-09

## 2024-09-09 ENCOUNTER — TRANSITIONAL CARE MANAGEMENT (OUTPATIENT)
Dept: FAMILY MEDICINE CLINIC | Facility: CLINIC | Age: 89
End: 2024-09-09

## 2024-09-09 NOTE — TELEPHONE ENCOUNTER
Regarding: Power got turn off & I'm on oxygen  ----- Message from Nader RIVAS sent at 5/28/2024  6:27 PM EDT -----  '' My electricity was turn off by [facility] and they needed a letter from the doctors office. The doctor's office did sent over a letter to [facility], but they're stating that the letter is incomplete and the doctor's office need to complete the letter. I'm on oxygen.''

## 2024-09-09 NOTE — UTILIZATION REVIEW
Notification of Unplanned, Urgent, or   Emergency Inpatient Admission   AUTHORIZATION REQUEST   [place] [address]  Tax ID: [id number]  NPI: [id number]  Place of Service: Acute Care Hospital  Admission Level of Care: Inpatient  Place of Service Code: 21     Unable to start auth oin portal. DX code wont load   Attending Physician Information  Attending Name and NPI#: Marija Khan Md [[id number]]  Phone: [phone]     Admission Information  Inpatient Admission Date/Time: 9/4/24  1:26 AM  Discharge Date/Time: 9/7/2024  2:34 PM  Admitting Diagnosis Code/Description:  Shortness of breath [R06.02]  COPD with acute exacerbation (HCC) [J44.1]  Acute hypoxic respiratory failure (HCC) [J96.01]     Utilization Review Contact  Tamika Dominguez, Utilization   Phone: [phone]  Fax: [phone]  Email: [email or web address]  Contact for approvals/pending authorizations, clinical reviews, and discharge.     Physician Advisory Services Contact  Medical Necessity Denial & Fepy-pr-Quec Discussion  Phone: [phone]  Fax: [phone]  Email: [email or web address]     DISCHARGE SUPPORT TEAM:  For Patients Discharge Needs & Updates  Phone: [phone] opt. 2 Fax: [phone]  Email: [email or web address]

## 2024-09-09 NOTE — UTILIZATION REVIEW
NOTIFICATION OF ADMISSION DISCHARGE   This is a Notification of Discharge from [facility]. Please be advised that this patient has been discharge from our facility. Below you will find the admission and discharge date and time including the patient’s disposition.   UTILIZATION REVIEW CONTACT:  Tamika Dominguez  Utilization   Network Utilization Review Department  Phone: [phone] x carefully listen to the prompts. All voicemails are confidential.  Email: [email or web address]     ADMISSION INFORMATION  PRESENTATION DATE: 9/3/2024  8:52 PM  OBERVATION ADMISSION DATE: N/A  INPATIENT ADMISSION DATE: 9/4/24  1:26 AM   DISCHARGE DATE: 9/7/2024  2:34 PM   DISPOSITION:Home with Home Health Care    Network Utilization Review Department  ATTENTION: Please call with any questions or concerns to [phone] and carefully listen to the prompts so that you are directed to the right person. All voicemails are confidential.   For Discharge needs, contact Care Management DC Support Team at [phone] opt. 2  Send all requests for admission clinical reviews, approved or denied determinations and any other requests to dedicated fax number below belonging to the campus where the patient is receiving treatment. List of dedicated fax numbers for the Facilities:  FACILITY NAME UR FAX NUMBER   ADMISSION DENIALS (Administrative/Medical Necessity) [phone]   DISCHARGE SUPPORT TEAM ([facility]) [phone]   PARENT CHILD HEALTH (Maternity/NICU/Pediatrics) [phone]   [facility] [phone]   [facility] [phone]   [facility] [phone]   [facility] [phone]   [facility] [phone]   [facility] [phone]   [facility] [phone]   [facility]  [phone]   [facility] [phone]   [facility] [phone]   [facility] [phone]   [facility] [phone]

## 2024-09-11 ENCOUNTER — OFFICE VISIT (OUTPATIENT)
Dept: FAMILY MEDICINE CLINIC | Facility: CLINIC | Age: 89
End: 2024-09-11
Payer: MEDICARE

## 2024-09-11 VITALS
WEIGHT: 126 LBS | DIASTOLIC BLOOD PRESSURE: 70 MMHG | HEIGHT: 63 IN | RESPIRATION RATE: 24 BRPM | BODY MASS INDEX: 22.32 KG/M2 | SYSTOLIC BLOOD PRESSURE: 120 MMHG | TEMPERATURE: 100.1 F | OXYGEN SATURATION: 93 % | HEART RATE: 88 BPM

## 2024-09-11 DIAGNOSIS — J41.8 MIXED SIMPLE AND MUCOPURULENT CHRONIC BRONCHITIS (HCC): Primary | ICD-10-CM

## 2024-09-11 DIAGNOSIS — E55.9 VITAMIN D DEFICIENCY: ICD-10-CM

## 2024-09-11 DIAGNOSIS — R62.7 FAILURE TO THRIVE IN ADULT: ICD-10-CM

## 2024-09-11 PROCEDURE — 99214 OFFICE O/P EST MOD 30 MIN: CPT | Performed by: FAMILY MEDICINE

## 2024-09-11 RX ORDER — ERGOCALCIFEROL 1.25 MG/1
50000 CAPSULE, LIQUID FILLED ORAL WEEKLY
Qty: 12 CAPSULE | Refills: 3 | Status: SHIPPED | OUTPATIENT
Start: 2024-09-11

## 2024-09-11 RX ORDER — AZITHROMYCIN 250 MG/1
250 TABLET, FILM COATED ORAL EVERY 24 HOURS
Qty: 30 TABLET | Refills: 11 | Status: SHIPPED | OUTPATIENT
Start: 2024-09-11 | End: 2025-09-06

## 2024-09-11 RX ORDER — DIPHENOXYLATE HYDROCHLORIDE AND ATROPINE SULFATE 2.5; .025 MG/1; MG/1
1 TABLET ORAL DAILY
Qty: 30 TABLET | Refills: 5 | Status: SHIPPED | OUTPATIENT
Start: 2024-09-11

## 2024-09-11 RX ORDER — PREDNISONE 5 MG/1
5 TABLET ORAL DAILY
Qty: 30 TABLET | Refills: 5 | Status: SHIPPED | OUTPATIENT
Start: 2024-09-11

## 2024-09-11 RX ORDER — BRIMONIDINE TARTRATE 2 MG/ML
SOLUTION/ DROPS OPHTHALMIC
COMMUNITY
Start: 2024-08-09 | End: 2024-09-11 | Stop reason: ALTCHOICE

## 2024-09-11 NOTE — PROGRESS NOTES
Transition of Care Visit  Name: Severiano Feliciano      : 1935      MRN: [id number]  Encounter Provider: Kirby Casanova MD  Encounter Date: 2024   Encounter department: [facility]    Assessment & Plan  Mixed simple and mucopurulent chronic bronchitis (HCC)  Chronic Obstructive Pulmonary Disease (COPD) with recurrent infections: The patient has a history of COPD exacerbations, recently hospitalized for an infection. He is currently experiencing fever, suggesting another infection. Plan to initiate continuous antibiotic therapy to prevent further exacerbations, especially with the upcoming winter season. Prescribe a daily antibiotic regimen in a way to decrease recurrent hospitalizations.     Prednisone therapy: Continue low-dose prednisone to manage COPD symptoms. Monitor for potential side effects, including delirium with higher doses.    Needs Inhaler and spacer use: The patient has difficulty using the inhaler directly. Educate on the use of a spacer to improve medication delivery. Daljit his son will purchase a spacer from [employer].    Orders:    Budeson-Glycopyrrol-Formoterol 160-9-4.8 MCG/ACT AERO; Inhale 2 puffs 2 (two) times a day Rinse mouth after use.    predniSONE 5 mg tablet; Take 1 tablet (5 mg total) by mouth daily    azithromycin (ZITHROMAX) 250 mg tablet; Take 1 tablet (250 mg total) by mouth every 24 hours    Vitamin D deficiency  Will continue trying  more protein intake use multivitamins.  Orders:    ergocalciferol (VITAMIN D2) 50,000 units; Take 1 capsule (50,000 Units total) by mouth once a week Take one capsule weekly by mouth    Failure to thrive in adult  In order to prevention further nutritional challenging, will increase protein intake and multivitamins  Orders:    multivitamin (THERAGRAN) TABS; Take 1 tablet by mouth daily         History of Present Illness     Transitional Care Management Review:   Severiano Feliciano is a 89 y.o. male  here for TCM follow up.     During the TCM phone call patient stated:  TCM Call       Date and time call was made  9/10/2024  9:51 AM    Hospital care reviewed  Records reviewed    Patient [facility] hospitial[facility] at  [facility]    Date of Admission  09/03/24    Date of discharge  09/07/24    Diagnosis  Chronic obstructive pulmonary disease with acute exacerbation    Disposition  Home    Were the patients medications reviewed and updated  Yes    Current Symptoms  None          TCM Call       Post hospital issues  None    Should patient be enrolled in anticoag monitoring?  No    Scheduled for follow up?  Yes    Patients specialists  [facility]ul[facility]    Did you obtain your prescribed medications  Yes    Do you need help managing your prescriptions or medications  No    Is transportation to your appointment needed  No    I have advised the patient to call PCP with any new or worsening symptoms  Ashly Montgomery MA    Living Arrangements  Family members    Have you fallen in the last 12 months  No    Interperter language line needed  No          HPI  TCM  Recent hospitalization for COPD exacerbation.  Reports fever and tongue swelling.  Difficulty using inhaler directly; requires spacer.  Currently on low-dose prednisone and daily multivitamin.    The patient, Severiano, aged 89, was recently hospitalized for a COPD infection. He reports persistent symptoms, including fever, which he believes may require continuous antibiotic therapy. He experiences fluctuating symptoms, sometimes improving and sometimes worsening. The patient also reports tongue swelling, which may be related to his medication. He has difficulty using his inhaler directly and requires a spacer for effective medication delivery. The patient is concerned about the risk of infections, especially with the approaching winter season. He mentions that he has been prescribed prednisone in varying doses during hospital stays and is currently on a low dose.  "Additionally, he takes a multivitamin daily.      Review of Systems  Objective     /70 (BP Location: Left arm, Patient Position: Sitting, Cuff Size: Standard)   Pulse 88   Temp 100.1 °F (37.8 °C) (Tympanic)   Resp (!) 24   Ht 5' 3\" (1.6 m)   Wt 57.2 kg (126 lb)   SpO2 93% Comment: oxygen 3 liters  BMI 22.32 kg/m²     Physical Exam  He looks in no distress, with nasal cannula delivering oxygen.  Mumbling will speak.  Cannot understand even in the [place]  The lungs sounds with very decreased breath sounds and crepitus all over both lung fields,  the heart has normal rhythm.  Wheelchair-bound.  Cannot stand or walk  Medications have been reviewed by provider in current encounter    Administrative Statements   I have spent a total time of 35 minutes in caring for this patient on the day of the visit/encounter including Diagnostic results, Prognosis, Risks and benefits of tx options, Instructions for management, Patient and family education, Importance of tx compliance, Risk factor reductions, Impressions, Counseling / Coordination of care, Documenting in the medical record, Reviewing / ordering tests, medicine, procedures  , and Obtaining or reviewing history  .      "

## 2024-09-11 NOTE — LETTER
September 11, 2024     Patient: Severiano Feliciano  YOB: 1935  Date of Visit: 9/11/2024      Acetaminophen cada 6 hrs si tiene dolor o fiebre    Ventolyn 2 inalaciones 3 veces al dalila    Azithromycin 1 diaria    Breztri )Budeson-Glycopyrrol-Formoterol Aero) 2 inalaciones 2 veces al dalila    Losartan  1 tableta diaria    Pantoprazole 1 tableta jarquin    predniSONE 1 tableta diaria    Senna Plus  1 tableta diaria    Torsemide 1 tableta diaria

## 2024-09-13 NOTE — ASSESSMENT & PLAN NOTE
Will continue trying  more protein intake use multivitamins.  Orders:    ergocalciferol (VITAMIN D2) 50,000 units; Take 1 capsule (50,000 Units total) by mouth once a week Take one capsule weekly by mouth

## 2024-09-16 DIAGNOSIS — M54.50 CHRONIC BILATERAL LOW BACK PAIN WITHOUT SCIATICA: ICD-10-CM

## 2024-09-16 DIAGNOSIS — K59.04 CHRONIC IDIOPATHIC CONSTIPATION: ICD-10-CM

## 2024-09-16 DIAGNOSIS — G89.29 CHRONIC BILATERAL LOW BACK PAIN WITHOUT SCIATICA: ICD-10-CM

## 2024-09-17 RX ORDER — ASPIRIN 81 MG
1 TABLET, DELAYED RELEASE (ENTERIC COATED) ORAL DAILY
Qty: 30 TABLET | Refills: 5 | Status: SHIPPED | OUTPATIENT
Start: 2024-09-17

## 2024-09-17 RX ORDER — SENNOSIDES 8.6 MG
650 CAPSULE ORAL EVERY 8 HOURS PRN
Qty: 90 TABLET | Refills: 5 | Status: SHIPPED | OUTPATIENT
Start: 2024-09-17

## 2024-09-20 ENCOUNTER — DOCUMENTATION (OUTPATIENT)
Dept: PULMONOLOGY | Facility: CLINIC | Age: 89
End: 2024-09-20

## 2024-09-20 ENCOUNTER — OFFICE VISIT (OUTPATIENT)
Dept: PULMONOLOGY | Facility: CLINIC | Age: 89
End: 2024-09-20
Payer: MEDICARE

## 2024-09-20 VITALS
SYSTOLIC BLOOD PRESSURE: 130 MMHG | DIASTOLIC BLOOD PRESSURE: 78 MMHG | HEART RATE: 78 BPM | BODY MASS INDEX: 23.18 KG/M2 | OXYGEN SATURATION: 98 % | WEIGHT: 130.8 LBS | HEIGHT: 63 IN

## 2024-09-20 DIAGNOSIS — J44.1 COPD WITH ACUTE EXACERBATION (HCC): Primary | ICD-10-CM

## 2024-09-20 DIAGNOSIS — J96.21 ACUTE ON CHRONIC RESPIRATORY FAILURE WITH HYPOXIA AND HYPERCAPNIA (HCC): ICD-10-CM

## 2024-09-20 DIAGNOSIS — J96.22 ACUTE ON CHRONIC RESPIRATORY FAILURE WITH HYPOXIA AND HYPERCAPNIA (HCC): ICD-10-CM

## 2024-09-20 DIAGNOSIS — G47.33 OSA TREATED WITH BIPAP: ICD-10-CM

## 2024-09-20 LAB
DME PARACHUTE DELIVERY DATE REQUESTED: NORMAL
DME PARACHUTE DELIVERY NOTE: NORMAL
DME PARACHUTE ITEM DESCRIPTION: NORMAL
DME PARACHUTE ORDER STATUS: NORMAL
DME PARACHUTE SUPPLIER NAME: NORMAL
DME PARACHUTE SUPPLIER PHONE: NORMAL

## 2024-09-20 PROCEDURE — G2211 COMPLEX E/M VISIT ADD ON: HCPCS | Performed by: NURSE PRACTITIONER

## 2024-09-20 PROCEDURE — 99214 OFFICE O/P EST MOD 30 MIN: CPT | Performed by: NURSE PRACTITIONER

## 2024-09-20 NOTE — PROGRESS NOTES
Pulmonary Follow-Up Note   Severiano Feliciano 89 y.o. male MRN: [id number]  9/23/2024      Assessment/Plan:    Problem List Items Addressed This Visit       Acute on chronic respiratory failure with hypoxia and hypercapnia (HCC)     Patient appears at respiratory baseline.  Currently wearing 3.5 L NC         Relevant Orders    Mask fitting only    Ambulatory Referral to Palliative Care    COPD with acute exacerbation (HCC) - Primary     Recent hospitalization from 09/03/2024 to 09/07/2024 for COPD exacerbation, acute hypoxic respiratory failure -fourth admission since last seen in the office with Dr. Hayward  Patient was treated with 5 days of ceftriaxone  Steroid induced delirium suspected-systemic steroids discontinued  Continued budesonide/Perforomist twice daily and DuoNebs 3 times daily  Discharged home on current medication regime: Budesonide/Perforomist twice daily and DuoNebs 3 times daily  Continue azithromycin 250 mg daily and prednisone 5 mg daily  Monitor prednisone therapy for potential side effects including delirium with higher doses  Appears at respiratory baseline today    COPD Gold E  Discussed with wife and granddaughter the role of palliative care and end-stage COPD.  Patient elderly and frail and suffers with frequent exacerbations and hospitalizations and would likely benefit from referral.    Family agreeable at this time palliative care referral placed           Relevant Orders    Ambulatory Referral to Palliative Care    JAY treated with BiPAP     Not compliant with cpap  Reviewed compliance with patient, wife and granddaughter   Discussed importance of compliance  Will order mask fitting    Uses AirCurve 10 VAuto  0 % compliance with days greater than 4 hours.  18 usage days  AHI 1.1          Vaccines: Recommend flu vaccine    Return in about 4 months (around 1/20/2025).    All of Severiano's questions were answered prior to leaving the office today. He will follow-up in 4 months or sooner  should the need arise. He is aware to call our office with any further questions or concerns.    I have spent a total time of 35 minutes in caring for this patient on the day of the visit/encounter including Diagnostic results, Prognosis, Risks and benefits of tx options, Instructions for management, Patient and family education, Importance of tx compliance, Risk factor reductions, Impressions, Counseling / Coordination of care, Documenting in the medical record, Reviewing / ordering tests, medicine, procedures  , and Obtaining or reviewing history  .    History of Present Illness   Reason for Visit: Hospital follow up  Chief Complaint: feels well  HPI: Severiano Feliciano is a 89 y.o. male who presents to the office today for hospital follow-up.  Patient had recent hospitalization from 09/03/2024 to 09/07/2024 for COPD exacerbation and acute hypoxic respiratory failure.  Patient has had 4 admissions due to respiratory status since last seen in the office by Dr. Hayward in May.  He presents today to the office overall feeling well.  Does not appear in any respiratory distress.  Currently at baseline oxygen requirement of 3.5 L continuously.    CPAP Compliance reviewed with wife.  Pt not compliant due to not being able to keep mask on at night.  Mask fitting ordered.  Importance of compliance greater than 4 hours a night reviewed  Patient is elderly and frail suffers from repeated exacerbations.  Discussed at length with wife and granddaughter about the benefit of palliative care and end-stage COPD.  Wife agreeable.  Patient will likely benefit from referral.  Referral for palliative care placed.    Review of Systems   Respiratory:  Positive for cough and shortness of breath.        Historical Information   Past Medical History:   Diagnosis Date    Acute metabolic encephalopathy 06/13/2020    Age-related cataract of right eye 04/04/2023    patient is medically cleared and presents with low risk of complication with  this upcoming R cataract surgery.    Anemia     Aneurysm, aorta, thoracic (HCC)     Appendicolith     Ascending aortic aneurysm (HCC)     3.7    Asthma     BPH (benign prostatic hyperplasia)     CAD (coronary artery disease)     noted on CT scan    CHF (congestive heart failure) (HCC)     COPD (chronic obstructive pulmonary disease) (HCC)     Descending thoracic aortic aneurysm (HCC)     Diabetes mellitus (HCC)     Diverticulosis     Former tobacco use     GERD (gastroesophageal reflux disease)     History of DVT (deep vein thrombosis)     Left leg    History of transfusion     Hypertension     Hypoxemia 04/30/2023    Inguinal hernia     right    Nephrolithiasis     Oxygen dependent     2LNC    Oxygen dependent     Pneumonia     Pre-diabetes     Prostate calculus     PVD (peripheral vascular disease) (HCC)     Recurrent right inguinal hernia w incarcertion 01/04/2023    Thoracic aortic aneurysm without rupture (HCC) 11/19/2018    Added automatically from request for surgery [id number]    Thrombocytopenia (HCC) 12/29/2018    Ulcer     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)     Varicose vein of leg     b/l     Past Surgical History:   Procedure Laterality Date    CARDIAC SURGERY      ESOPHAGOGASTRODUODENOSCOPY      HERNIA REPAIR Right 1/21/2019    Procedure: REPAIR HERNIA INGUINAL WITH MESH;  Surgeon: David Lowry MD;  Location:  MAIN OR;  Service: General    HERNIA REPAIR Right 7/22/2023    Procedure: REPAIR RECURRENT INCARERATED HERNIA INGUINAL OPEN, RIGHT ORCHIECTOMY;  Surgeon: Mason Bertrand MD;  Location: [facility];  Service: General    INGUINAL HERNIA REPAIR Bilateral     IR TEVAR  12/27/2018    WA EVASC RPR DTA COVERAGE ART ORIGIN 1ST ENDOPROSTH N/A 12/27/2018    Procedure: TEVAR - endovascular thoracic aortic aneurysm repair;  Surgeon: Gladis Ortega MD;  Location: [facility];  Service: Vascular    THORACIC AORTIC ANEURYSM REPAIR  12/27/2018    VARICOSE VEIN SURGERY Bilateral     vein  stripping     Family History   Problem Relation Age of Onset    Tuberculosis Mother     No Known Problems Father     Cancer Sister     Diabetes Family     Hypertension Family      Social History   Social History     Substance and Sexual Activity   Alcohol Use Never     Social History     Substance and Sexual Activity   Drug Use No     Social History     Tobacco Use   Smoking Status Former    Current packs/day: 0.00    Average packs/day: 1 pack/day for 35.0 years (35.0 ttl pk-yrs)    Types: Cigarettes    Start date:     Quit date:     Years since quittin.7   Smokeless Tobacco Never     E-Cigarette/Vaping    E-Cigarette Use Never User      E-Cigarette/Vaping Substances    Nicotine No     THC No     CBD No     Flavoring No     Other No     Unknown No        Meds/Allergies     Current Outpatient Medications:     acetaminophen (TYLENOL) 650 mg CR tablet, TAKE 1 TABLET (650 MG TOTAL) BY MOUTH EVERY 8 (EIGHT) HOURS AS NEEDED FOR MILD PAIN, Disp: 90 tablet, Rfl: 5    albuterol (PROVENTIL HFA,VENTOLIN HFA) 90 mcg/act inhaler, INHALE 2 PUFFS EVERY 6 (SIX) HOURS AS NEEDED FOR WHEEZINGINHALE 2 PUFFS CADA 6 (SIX) HOURS AS NEEDED PARA WHEEZING, Disp: 18 g, Rfl: 1    azithromycin (ZITHROMAX) 250 mg tablet, Take 1 tablet (250 mg total) by mouth every 24 hours, Disp: 30 tablet, Rfl: 11    Budeson-Glycopyrrol-Formoterol 160-9-4.8 MCG/ACT AERO, Inhale 2 puffs 2 (two) times a day Rinse mouth after use., Disp: 10.7 g, Rfl: 5    budesonide (Pulmicort) 0.5 mg/2 mL nebulizer solution, Take 2 mL (0.5 mg total) by nebulization 2 (two) times a day Rinse mouth after use., Disp: 360 mL, Rfl: 3    ergocalciferol (VITAMIN D2) 50,000 units, Take 1 capsule (50,000 Units total) by mouth once a week Take one capsule weekly by mouth, Disp: 12 capsule, Rfl: 3    formoterol (PERFOROMIST) 20 MCG/2ML nebulizer solution, Take 2 mL (20 mcg total) by nebulization 2 (two) times a day, Disp: 360 mL, Rfl: 3    ipratropium-albuterol (DUO-NEB)  "0.5-2.5 mg/3 mL nebulizer solution, Take 3 mL by nebulization 2 (two) times a day, Disp: 540 mL, Rfl: 3    ketoconazole (NIZORAL) 2 % shampoo, Apply 1 Application topically 2 (two) times a week, Disp: 120 mL, Rfl: 5    losartan (COZAAR) 25 mg tablet, TAKE ONE TABLET BY MOUTH TWICE DAILYTOMAR 1 TABLETA POR VIA ORAL DOS VECES AL EULALIO, Disp: 180 tablet, Rfl: 0    mometasone (ELOCON) 0.1 % lotion, Apply topically daily, Disp: 60 mL, Rfl: 5    multivitamin (THERAGRAN) TABS, Take 1 tablet by mouth daily, Disp: 30 tablet, Rfl: 5    pantoprazole (PROTONIX) 40 mg tablet, TAKE 1 TABLET (40 MG TOTAL) BY MOUTH DAILY, Disp: 30 tablet, Rfl: 5    predniSONE 5 mg tablet, Take 1 tablet (5 mg total) by mouth daily, Disp: 30 tablet, Rfl: 5    Senna Plus 8.6-50 MG per tablet, TAKE 1 TABLET BY MOUTH DAILY, Disp: 30 tablet, Rfl: 5    torsemide (DEMADEX) 5 MG tablet, Take 1 tablet (5 mg total) by mouth daily, Disp: 30 tablet, Rfl: 5  Allergies   Allergen Reactions    Penicillins Hives, Itching and Rash    Lisinopril Rash     Side pains and rash     Lasix [Furosemide] Other (See Comments)     unknown    Zyprexa [Olanzapine] Tongue Swelling       Vitals: Blood pressure 130/78, pulse 78, height 5' 3\" (1.6 m), weight 59.3 kg (130 lb 12.8 oz), SpO2 98%. Body mass index is 23.17 kg/m². Oxygen Therapy  SpO2: 98 %RA    Physical Exam:  Physical Exam  Vitals reviewed.   Constitutional:       General: He is not in acute distress.     Appearance: He is not ill-appearing.      Comments: Lithuanian-speaking patient's wife and granddaughter present during visit   HENT:      Head: Normocephalic and atraumatic.      Right Ear: External ear normal.      Left Ear: External ear normal.      Nose: Nose normal.      Mouth/Throat:      Mouth: Mucous membranes are moist.      Pharynx: Oropharynx is clear.   Eyes:      Extraocular Movements: Extraocular movements intact.      Pupils: Pupils are equal, round, and reactive to light.   Cardiovascular:      Rate and " "Rhythm: Normal rate and regular rhythm.      Pulses: Normal pulses.      Heart sounds: Normal heart sounds.   Pulmonary:      Effort: Pulmonary effort is normal.      Breath sounds: Normal breath sounds. No wheezing or rhonchi.   Abdominal:      General: Bowel sounds are normal.   Musculoskeletal:         General: Normal range of motion.      Cervical back: Normal range of motion and neck supple.      Right lower leg: No edema.      Left lower leg: No edema.   Skin:     General: Skin is warm and dry.   Neurological:      Mental Status: He is alert and oriented to person, place, and time.   Psychiatric:         Mood and Affect: Mood normal.         Behavior: Behavior normal.         Thought Content: Thought content normal.         Judgment: Judgment normal.       Imaging and other studies: Reviewed radiology reports from this admission including: chest xray.   Chest x-ray 09/06/2024 shows increased density of the right lung base infiltrate suggesting worsening pneumonia.  Questionable trace left effusion    Pulmonary Results (PFTs, PSG): Reviewed radiology reports from this admission including: PFT.   PFTs 03/06/2024 show very severe obstructive ventilatory flow limitation with severely obstructed flow-volume loop FEV1 25%, FVC 44%, FEV1/FVC ratio 42%      RAQUEL Montague  [facility]      Portions of the record may have been created with voice recognition software.  Occasional wrong word or \"sound a like\" substitutions may have occurred due to the inherent limitations of voice recognition software.  Read the chart carefully and recognize, using context, where substitutions have occurred or contact the dictating provider.  "

## 2024-09-20 NOTE — ASSESSMENT & PLAN NOTE
Not compliant with cpap  Reviewed compliance with patient, wife and granddaughter   Discussed importance of compliance  Will order mask fitting    Uses AirCurve 10 VAuto  0 % compliance with days greater than 4 hours.  18 usage days  AHI 1.1

## 2024-09-20 NOTE — PATIENT INSTRUCTIONS
Reviewed nebulizer technique  Reviewed Pap therapy compliance  Follow up in 4 month to review compliance

## 2024-09-23 NOTE — ASSESSMENT & PLAN NOTE
Recent hospitalization from 09/03/2024 to 09/07/2024 for COPD exacerbation, acute hypoxic respiratory failure -fourth admission since last seen in the office with Dr. Hayward  Patient was treated with 5 days of ceftriaxone  Steroid induced delirium suspected-systemic steroids discontinued  Continued budesonide/Perforomist twice daily and DuoNebs 3 times daily  Discharged home on current medication regime: Budesonide/Perforomist twice daily and DuoNebs 3 times daily  Continue azithromycin 250 mg daily and prednisone 5 mg daily  Monitor prednisone therapy for potential side effects including delirium with higher doses  Appears at respiratory baseline today    COPD Gold E  Discussed with wife and granddaughter the role of palliative care and end-stage COPD.  Patient elderly and frail and suffers with frequent exacerbations and hospitalizations and would likely benefit from referral.    Family agreeable at this time palliative care referral placed

## 2024-09-24 LAB
DME PARACHUTE DELIVERY DATE REQUESTED: NORMAL
DME PARACHUTE DELIVERY NOTE: NORMAL
DME PARACHUTE ITEM DESCRIPTION: NORMAL
DME PARACHUTE ITEM DESCRIPTION: NORMAL
DME PARACHUTE ORDER STATUS: NORMAL
DME PARACHUTE SUPPLIER NAME: NORMAL
DME PARACHUTE SUPPLIER PHONE: NORMAL

## 2024-09-27 ENCOUNTER — TELEPHONE (OUTPATIENT)
Age: 89
End: 2024-09-27

## 2024-09-27 NOTE — TELEPHONE ENCOUNTER
Sharifa (Nurse) [facility] called with a message for Dr. Casanova.    Pt's family is requesting to discontinue services. Pt's family stating that it no longer needed.

## 2024-10-02 NOTE — TELEPHONE ENCOUNTER
Carmen is requesting that the date is added to the order scanned into patient's chart today and sent back to them.

## 2024-10-07 ENCOUNTER — TELEPHONE (OUTPATIENT)
Age: 89
End: 2024-10-07

## 2024-10-07 NOTE — TELEPHONE ENCOUNTER
Received call from [facility] stating that they received the Home Health Certification Plan Of Care forms and the provider  signed but did not date the forms.    Requesting the forms be dated and re-faxed     Please Review

## 2024-10-23 ENCOUNTER — TELEPHONE (OUTPATIENT)
Dept: FAMILY MEDICINE CLINIC | Facility: CLINIC | Age: 89
End: 2024-10-23

## 2024-11-05 ENCOUNTER — OFFICE VISIT (OUTPATIENT)
Dept: FAMILY MEDICINE CLINIC | Facility: CLINIC | Age: 89
End: 2024-11-05

## 2024-11-05 VITALS
OXYGEN SATURATION: 96 % | TEMPERATURE: 97.2 F | HEART RATE: 72 BPM | SYSTOLIC BLOOD PRESSURE: 130 MMHG | HEIGHT: 63 IN | BODY MASS INDEX: 23.5 KG/M2 | RESPIRATION RATE: 18 BRPM | DIASTOLIC BLOOD PRESSURE: 80 MMHG | WEIGHT: 132.6 LBS

## 2024-11-05 DIAGNOSIS — J96.11 CHRONIC RESPIRATORY FAILURE WITH HYPOXIA AND HYPERCAPNIA (HCC): ICD-10-CM

## 2024-11-05 DIAGNOSIS — Z59.12 INADEQUATE HOUSING UTILITIES: ICD-10-CM

## 2024-11-05 DIAGNOSIS — J44.9 COPD, SEVERE (HCC): ICD-10-CM

## 2024-11-05 DIAGNOSIS — I10 BENIGN ESSENTIAL HYPERTENSION: ICD-10-CM

## 2024-11-05 DIAGNOSIS — Z00.00 MEDICARE ANNUAL WELLNESS VISIT, SUBSEQUENT: Primary | ICD-10-CM

## 2024-11-05 DIAGNOSIS — E78.2 MIXED HYPERLIPIDEMIA: ICD-10-CM

## 2024-11-05 DIAGNOSIS — H90.3 SENSORINEURAL HEARING LOSS (SNHL) OF BOTH EARS: ICD-10-CM

## 2024-11-05 DIAGNOSIS — Z59.819 HOUSING INSTABILITY: ICD-10-CM

## 2024-11-05 DIAGNOSIS — Z59.41 FOOD INSECURITY: ICD-10-CM

## 2024-11-05 DIAGNOSIS — J96.12 CHRONIC RESPIRATORY FAILURE WITH HYPOXIA AND HYPERCAPNIA (HCC): ICD-10-CM

## 2024-11-05 DIAGNOSIS — I50.32 CHRONIC DIASTOLIC (CONGESTIVE) HEART FAILURE (HCC): ICD-10-CM

## 2024-11-05 PROBLEM — R65.10 SIRS (SYSTEMIC INFLAMMATORY RESPONSE SYNDROME) (HCC): Status: RESOLVED | Noted: 2024-09-04 | Resolved: 2024-11-05

## 2024-11-05 PROBLEM — R41.0 DELIRIUM: Status: RESOLVED | Noted: 2024-04-25 | Resolved: 2024-11-05

## 2024-11-05 PROBLEM — G93.41 ACUTE METABOLIC ENCEPHALOPATHY: Status: RESOLVED | Noted: 2020-06-13 | Resolved: 2024-11-05

## 2024-11-05 PROBLEM — J43.8 OTHER EMPHYSEMA (HCC): Status: ACTIVE | Noted: 2018-02-01

## 2024-11-05 SDOH — ECONOMIC STABILITY - FOOD INSECURITY: FOOD INSECURITY: Z59.41

## 2024-11-05 SDOH — ECONOMIC STABILITY - HOUSING INSECURITY: INADEQUATE HOUSING UTILITIES: Z59.12

## 2024-11-05 SDOH — ECONOMIC STABILITY - HOUSING INSECURITY: HOUSING INSTABILITY UNSPECIFIED: Z59.819

## 2024-11-05 NOTE — ASSESSMENT & PLAN NOTE
Patient maintaining appropriate SpO2 on 3 L of oxygen.  Recommend patient continue with budeson-glycopyrrol-formoterol 160 - 9 - 4 0.8 mcg 2 puffs twice daily, DuoNebs 3 times daily, azithromycin 250 mg daily, and prednisone 5 mg daily.  Palliative care referral also previously placed.  Last seen by pulmonology on September 20, 2024 (note reviewed).  Once again had conversation with family in regard to palliative referral.  I stressed that it is not hospice and he would still be able to get therapy if he would like.  Family will keep palliative consideration.

## 2024-11-05 NOTE — PROGRESS NOTES
Ambulatory Visit  Name: Severiano Feliciano      : 1935      MRN: [id number]  Encounter Provider: Cesario Montalvo MD  Encounter Date: 2024   Encounter department: [facility]  Medicare annual wellness visit, subsequent  Patient does not wish to get flu vaccine, RSV vaccine, zoster vaccine.       COPD, severe (HCC)  Patient maintaining appropriate SpO2 on 3 L of oxygen.  Recommend patient continue with budeson-glycopyrrol-formoterol 160 - 9 - 4 0.8 mcg 2 puffs twice daily, DuoNebs 3 times daily, azithromycin 250 mg daily, and prednisone 5 mg daily.  Palliative care referral also previously placed.  Last seen by pulmonology on 2024 (note reviewed).  Once again had conversation with family in regard to palliative referral.  I stressed that it is not hospice and he would still be able to get therapy if he would like.  Family will keep palliative consideration.       Chronic respiratory failure with hypoxia and hypercapnia (HCC)  Patient currently at baseline.  Continue 3 L nasal cannula.  Continue follow-up with pulmonology.         Chronic diastolic (congestive) heart failure (HCC)  Wt Readings from Last 3 Encounters:   24 60.1 kg (132 lb 9.6 oz)   24 59.3 kg (130 lb 12.8 oz)   24 57.2 kg (126 lb)     Stable.  Continue with torsemide 5 mg daily.         Benign essential hypertension  BP Readings from Last 3 Encounters:   24 130/80   24 130/78   24 120/70      BP at goal.  Continue with losartan 25 mg twice daily       Mixed hyperlipidemia  Patient not using any statin medication.  Continue with dietary control.         Sensorineural hearing loss (SNHL) of both ears  Seen by ENT on 2024.  Patient had right tympanostomy tube removed.  Continues to have left tympanostomy tube in place.  Recommend continued follow-up with ENT       Food insecurity    Orders:    Ambulatory referral to  social work care management program; Future    Housing instability    Orders:    Ambulatory referral to social work care management program; Future    Inadequate housing utilities    Orders:    Ambulatory referral to social work care management program; Future       Preventive health issues were discussed with patient, and age appropriate screening tests were ordered as noted in patient's After Visit Summary. Personalized health advice and appropriate referrals for health education or preventive services given if needed, as noted in patient's After Visit Summary.    History of Present Illness        Patient Care Team:  Kirby Casanova MD as PCP - General (Family Medicine)  [facility] as PCP - PCP-[facility] ([facility]  MD Monika Caal MD    Review of Systems   Constitutional:  Negative for chills and fever.   Respiratory:  Negative for cough and shortness of breath.    Cardiovascular:  Negative for chest pain.   Gastrointestinal:  Negative for abdominal pain, anal bleeding, blood in stool, constipation, diarrhea, nausea and vomiting.   Genitourinary:  Negative for dysuria and hematuria.   Neurological:  Negative for dizziness, syncope, weakness, numbness and headaches.     Medical History Reviewed by provider this encounter:       Annual Wellness Visit Questionnaire   Last Medicare Wellness visit information reviewed, patient interviewed and updates made to the record today.      Health Risk Assessment:   Patient rates overall health as fair. Patient feels that their physical health rating is slightly worse. Patient is satisfied with their life. Eyesight was rated as slightly worse. Hearing was rated as slightly worse. Patient feels that their emotional and mental health rating is slightly worse. Patients states they are never, rarely angry. Patient states they are sometimes unusually tired/fatigued. Pain experienced in the last 7 days has been some. Patient's pain rating has  been 5/10. Patient states that he has experienced weight loss or gain in last 6 months. Physical health a little worse: less active after completing PT.    Ophthalmology: last eval over a year ago    Last saw audiology on 10/31/2024 (note reviewed)    Back pain occasionally and uses ibuprofen which improves pain.    Depression Screening:   PHQ-2 Score: 2      Fall Risk Screening:   In the past year, patient has experienced: no history of falling in past year      Home Safety:  Patient has trouble with stairs inside or outside of their home. Patient has working smoke alarms and has working carbon monoxide detector. Home safety hazards include: none.     Nutrition:   Current diet is Regular and No Added Salt.     Medications:   Patient is not currently taking any over-the-counter supplements. Patient is not able to manage medications.     Activities of Daily Living (ADLs)/Instrumental Activities of Daily Living (IADLs):   Walk and transfer into and out of bed and chair?: No  Dress and groom yourself?: No    Bathe or shower yourself?: No    Feed yourself? No  Do your laundry/housekeeping?: No  Manage your money, pay your bills and track your expenses?: No  Make your own meals?: No    Do your own shopping?: No    ADL comments: Receiving most of his assistance from his son and wife. Family is always present with pt.    Previous Hospitalizations:   Any hospitalizations or ED visits within the last 12 months?: Yes    How many hospitalizations have you had in the last year?: more than 4    Hospitalization Comments: 9/3/24 Hospital admission: Acute on chronic respiratory failure with hypoxia and hypercapnia given antibiotic course with subsequent improvement  8/8/2024 hospital admission: Acute on chronic respiratory failure with hypoxia and hypercapnia  6/27/24 hospital admission: AMS due to acute infection.  5/19/2024 hospital admission: AMS due to acute infection  5/18/2024 ED eval: generalized weakness  4/25/2024 hospital  admission: COPD exacerbation  3/6/2024 ED eval  2/21/2024: AMS  2/15/2024:   1/29/2024: ED eval: SOB  1/1/2024 hospital admission: COPD exacerbation  12/27/2024 ED eval: COPD exacerbation  12/5/2023 hospital admission: COPD exacerbation  11/15/2023 hospital admission: rectal bleeding    Advance Care Planning:   Living will: No    Advanced directive counseling given: Yes    ACP document given: Yes      Cognitive Screening:   Provider or family/friend/caregiver concerned regarding cognition?: No    PREVENTIVE SCREENINGS      Cardiovascular Screening:    General: Screening Not Indicated and History Lipid Disorder      Diabetes Screening:     General: Screening Current      Colorectal Cancer Screening:     General: Screening Not Indicated      Prostate Cancer Screening:    General: Screening Not Indicated      Osteoporosis Screening:    General: Risks and Benefits Discussed and Screening Not Indicated      Abdominal Aortic Aneurysm (AAA) Screening:    Risk factors include: tobacco use        Lung Cancer Screening:     General: Screening Not Indicated      Hepatitis C Screening:    General: Screening Not Indicated    Screening, Brief Intervention, and Referral to Treatment (SBIRT)    Screening  Typical number of drinks in a day: 0    Brief Intervention  Alcohol & drug use screenings were reviewed. No concerns regarding substance use disorder identified.     Other Counseling Topics:   Car/seat belt/driving safety, skin self-exam, sunscreen and regular weightbearing exercise and calcium and vitamin D intake.     Social Determinants of Health     Financial Resource Strain: High Risk (10/25/2023)    Overall Financial Resource Strain (CARDIA)     Difficulty of Paying Living Expenses: Very hard   Food Insecurity: Food Insecurity Present (11/5/2024)    Hunger Vital Sign     Worried About Running Out of Food in the Last Year: Sometimes true     Ran Out of Food in the Last Year: Sometimes true   Transportation Needs: No  "Transportation Needs (11/5/2024)    PRAPARE - Transportation     Lack of Transportation (Medical): No     Lack of Transportation (Non-Medical): No   Housing Stability: High Risk (11/5/2024)    Housing Stability Vital Sign     Unable to Pay for Housing in the Last Year: Yes     Number of Times Moved in the Last Year: 0     Homeless in the Last Year: No   Utilities: At Risk (11/5/2024)    [facility] Utilities     Threatened with loss of utilities: Yes     No results found.    Objective     /80 (BP Location: Right arm, Patient Position: Sitting)   Pulse 72   Temp (!) 97.2 °F (36.2 °C) (Tympanic)   Resp 18   Ht 5' 3\" (1.6 m)   Wt 60.1 kg (132 lb 9.6 oz)   SpO2 96%   BMI 23.49 kg/m²     Physical Exam  Vitals and nursing note reviewed.   Constitutional:       General: He is not in acute distress.     Appearance: He is normal weight. He is not toxic-appearing or diaphoretic.      Comments: Patient sitting comfortably with nasal cannula in place.   HENT:      Head: Normocephalic.      Right Ear: Tympanic membrane, ear canal and external ear normal.      Left Ear: A PE tube is present.      Nose: Nose normal.      Mouth/Throat:      Mouth: Mucous membranes are moist.      Pharynx: Oropharynx is clear.   Eyes:      Conjunctiva/sclera: Conjunctivae normal.   Cardiovascular:      Rate and Rhythm: Normal rate and regular rhythm.      Heart sounds: Normal heart sounds.   Pulmonary:      Effort: Pulmonary effort is normal.      Breath sounds: Normal breath sounds.   Abdominal:      Palpations: Abdomen is soft.   Musculoskeletal:      Cervical back: Neck supple.      Comments: Requiring assistance for ambulation.   Skin:     General: Skin is warm and dry.   Neurological:      Mental Status: He is alert. Mental status is at baseline.   Psychiatric:         Mood and Affect: Mood normal.         Behavior: Behavior normal.         "

## 2024-11-05 NOTE — ASSESSMENT & PLAN NOTE
BP Readings from Last 3 Encounters:   11/05/24 130/80   09/20/24 130/78   09/11/24 120/70      BP at goal.  Continue with losartan 25 mg twice daily

## 2024-11-05 NOTE — PATIENT INSTRUCTIONS
Medicare Preventive Visit Patient Instructions  Thank you for completing your Welcome to Medicare Visit or Medicare Annual Wellness Visit today. Your next wellness visit will be due in one year (11/6/2025).  The screening/preventive services that you may require over the next 5-10 years are detailed below. Some tests may not apply to you based off risk factors and/or age. Screening tests ordered at today's visit but not completed yet may show as past due. Also, please note that scanned in results may not display below.  Preventive Screenings:  Service Recommendations Previous Testing/Comments   Colorectal Cancer Screening  Colonoscopy    Fecal Occult Blood Test (FOBT)/Fecal Immunochemical Test (FIT)  Fecal DNA/Cologuard Test  Flexible Sigmoidoscopy Age: 45-75 years old   Colonoscopy: every 10 years (May be performed more frequently if at higher risk)  OR  FOBT/FIT: every 1 year  OR  Cologuard: every 3 years  OR  Sigmoidoscopy: every 5 years  Screening may be recommended earlier than age 45 if at higher risk for colorectal cancer. Also, an individualized decision between you and your healthcare provider will decide whether screening between the ages of 76-85 would be appropriate. Colonoscopy: Not on file  FOBT/FIT: Not on file  Cologuard: Not on file  Sigmoidoscopy: Not on file    Screening Not Indicated     Prostate Cancer Screening Individualized decision between patient and health care provider in men between ages of 55-69   Medicare will cover every 12 months beginning on the day after your 50th birthday PSA: No results in last 5 years     Screening Not Indicated     Hepatitis C Screening Once for adults born between 1945 and 1965  More frequently in patients at high risk for Hepatitis C Hep C Antibody: Not on file        Diabetes Screening 1-2 times per year if you're at risk for diabetes or have pre-diabetes Fasting glucose: 109 mg/dL (6/12/2020)  A1C: 6.2 % (9/5/2024)  Screening Current   Cholesterol Screening  Once every 5 years if you don't have a lipid disorder. May order more often based on risk factors. Lipid panel: 03/14/2022  Screening Not Indicated  History Lipid Disorder      Other Preventive Screenings Covered by Medicare:  Abdominal Aortic Aneurysm (AAA) Screening: covered once if your at risk. You're considered to be at risk if you have a family history of AAA or a male between the age of 65-75 who smoking at least 100 cigarettes in your lifetime.  Lung Cancer Screening: covers low dose CT scan once per year if you meet all of the following conditions: (1) Age 55-77; (2) No signs or symptoms of lung cancer; (3) Current smoker or have quit smoking within the last 15 years; (4) You have a tobacco smoking history of at least 20 pack years (packs per day x number of years you smoked); (5) You get a written order from a healthcare provider.  Glaucoma Screening: covered annually if you're considered high risk: (1) You have diabetes OR (2) Family history of glaucoma OR (3)  aged 50 and older OR (4)  American aged 65 and older  Osteoporosis Screening: covered every 2 years if you meet one of the following conditions: (1) Have a vertebral abnormality; (2) On glucocorticoid therapy for more than 3 months; (3) Have primary hyperparathyroidism; (4) On osteoporosis medications and need to assess response to drug therapy.  HIV Screening: covered annually if you're between the age of 15-65. Also covered annually if you are younger than 15 and older than 65 with risk factors for HIV infection. For pregnant patients, it is covered up to 3 times per pregnancy.    Immunizations:  Immunization Recommendations   Influenza Vaccine Annual influenza vaccination during flu season is recommended for all persons aged >= 6 months who do not have contraindications   Pneumococcal Vaccine   * Pneumococcal conjugate vaccine = PCV13 (Prevnar 13), PCV15 (Vaxneuvance), PCV20 (Prevnar 20)  * Pneumococcal polysaccharide  vaccine = PPSV23 (Pneumovax) Adults 19-65 yo with certain risk factors or if 65+ yo  If never received any pneumonia vaccine: recommend Prevnar 20 (PCV20)  Give PCV20 if previously received 1 dose of PCV13 or PPSV23   Hepatitis B Vaccine 3 dose series if at intermediate or high risk (ex: diabetes, end stage renal disease, liver disease)   Respiratory syncytial virus (RSV) Vaccine - COVERED BY MEDICARE PART D  * RSVPreF3 (Arexvy) CDC recommends that adults 60 years of age and older may receive a single dose of RSV vaccine using shared clinical decision-making (SCDM)   Tetanus (Td) Vaccine - COST NOT COVERED BY MEDICARE PART B Following completion of primary series, a booster dose should be given every 10 years to maintain immunity against tetanus. Td may also be given as tetanus wound prophylaxis.   Tdap Vaccine - COST NOT COVERED BY MEDICARE PART B Recommended at least once for all adults. For pregnant patients, recommended with each pregnancy.   Shingles Vaccine (Shingrix) - COST NOT COVERED BY MEDICARE PART B  2 shot series recommended in those 19 years and older who have or will have weakened immune systems or those 50 years and older     Health Maintenance Due:  There are no preventive care reminders to display for this patient.  Immunizations Due:      Topic Date Due   • COVID-19 Vaccine (3 - Pfizer risk series) 06/29/2021   • Influenza Vaccine (1) Never done     Advance Directives   What are advance directives?  Advance directives are legal documents that state your wishes and plans for medical care. These plans are made ahead of time in case you lose your ability to make decisions for yourself. Advance directives can apply to any medical decision, such as the treatments you want, and if you want to donate organs.   What are the types of advance directives?  There are many types of advance directives, and each state has rules about how to use them. You may choose a combination of any of the following:  Living  will:  This is a written record of the treatment you want. You can also choose which treatments you do not want, which to limit, and which to stop at a certain time. This includes surgery, medicine, IV fluid, and tube feedings.   Durable power of  for healthcare (DPAHC):  This is a written record that states who you want to make healthcare choices for you when you are unable to make them for yourself. This person, called a proxy, is usually a family member or a friend. You may choose more than 1 proxy.  Do not resuscitate (DNR) order:  A DNR order is used in case your heart stops beating or you stop breathing. It is a request not to have certain forms of treatment, such as CPR. A DNR order may be included in other types of advance directives.  Medical directive:  This covers the care that you want if you are in a coma, near death, or unable to make decisions for yourself. You can list the treatments you want for each condition. Treatment may include pain medicine, surgery, blood transfusions, dialysis, IV or tube feedings, and a ventilator (breathing machine).  Values history:  This document has questions about your views, beliefs, and how you feel and think about life. This information can help others choose the care that you would choose.  Why are advance directives important?  An advance directive helps you control your care. Although spoken wishes may be used, it is better to have your wishes written down. Spoken wishes can be misunderstood, or not followed. Treatments may be given even if you do not want them. An advance directive may make it easier for your family to make difficult choices about your care.       © Copyright [employer] 2018 Information is for End User's use only and may not be sold, redistributed or otherwise used for commercial purposes. All illustrations and images included in CareNotes® are the copyrighted property of [employer] or [employer]

## 2024-11-06 ENCOUNTER — PATIENT OUTREACH (OUTPATIENT)
Dept: CASE MANAGEMENT | Facility: OTHER | Age: 89
End: 2024-11-06

## 2024-11-06 NOTE — PROGRESS NOTES
CAMILA SCHULER received a referral regarding patient's need for assistance with SDOH (food, housing, utilities). CAMILA SCHULER contacted patient's son, Sukumar, initially. Sukumar stated that it would be best to contact patient's wife,Viridiana.    CAMILA SCHULER contacted patient's wife, Viridiana. Viridiana was appreciative of the call but states she is already receiving help from a Community Health Worker at [facility] where she receives care. Viridiana reports she is getting all of the help needed with regard to SDOH and denies needing any additional assistance. CAMILA SCHULER will close referral at this time. CAMILA SCHULER will be available if needed, via new order.

## 2024-11-06 NOTE — ASSESSMENT & PLAN NOTE
Seen by ENT on October 31, 2024.  Patient had right tympanostomy tube removed.  Continues to have left tympanostomy tube in place.  Recommend continued follow-up with ENT

## 2024-11-06 NOTE — ASSESSMENT & PLAN NOTE
Patient currently at baseline.  Continue 3 L nasal cannula.  Continue follow-up with pulmonology.

## 2024-11-06 NOTE — ASSESSMENT & PLAN NOTE
Wt Readings from Last 3 Encounters:   11/05/24 60.1 kg (132 lb 9.6 oz)   09/20/24 59.3 kg (130 lb 12.8 oz)   09/11/24 57.2 kg (126 lb)     Stable.  Continue with torsemide 5 mg daily.

## 2024-11-15 ENCOUNTER — TELEPHONE (OUTPATIENT)
Age: 89
End: 2024-11-15

## 2024-11-15 DIAGNOSIS — I10 BENIGN ESSENTIAL HYPERTENSION: ICD-10-CM

## 2024-11-15 RX ORDER — LOSARTAN POTASSIUM 25 MG/1
25 TABLET ORAL 2 TIMES DAILY
Qty: 180 TABLET | Refills: 1 | Status: SHIPPED | OUTPATIENT
Start: 2024-11-15

## 2024-11-15 NOTE — TELEPHONE ENCOUNTER
Patient was just seen by Dr. Montalvo who reported he was taking losartan 25mg twice daily and BP was controlled, please call patient to confirm that he has been taking twice daily and that he is still using [facility] - I sent refill there - please confirm if he is no longer using [facility]

## 2024-11-15 NOTE — TELEPHONE ENCOUNTER
Renée from [facility] pharmacy department called in regards to patients losartan 25 mg. Renée wanted to know if medication was still active on patients medication list. Renée stated that the last time patient picked up medication from the pharmacy was 5/17/2024 and if provider would like patient to continue taking medication he would need to send a new script into the pharmacy.

## 2024-11-21 ENCOUNTER — TELEPHONE (OUTPATIENT)
Age: 89
End: 2024-11-21

## 2024-11-21 NOTE — TELEPHONE ENCOUNTER
Patient's pharmacy received his orders from another pharmacy and called to clarify his Zithromax order. Nothing further needed at this time.

## 2024-12-03 DIAGNOSIS — I10 BENIGN ESSENTIAL HYPERTENSION: ICD-10-CM

## 2024-12-03 DIAGNOSIS — J96.12 CHRONIC RESPIRATORY FAILURE WITH HYPOXIA AND HYPERCAPNIA (HCC): ICD-10-CM

## 2024-12-03 DIAGNOSIS — J96.11 CHRONIC RESPIRATORY FAILURE WITH HYPOXIA AND HYPERCAPNIA (HCC): ICD-10-CM

## 2024-12-03 RX ORDER — LOSARTAN POTASSIUM 25 MG/1
25 TABLET ORAL 2 TIMES DAILY
Qty: 180 TABLET | Refills: 1 | Status: SHIPPED | OUTPATIENT
Start: 2024-12-03

## 2024-12-03 NOTE — TELEPHONE ENCOUNTER
Crissy from [facility] called patient only uses [facility] . Not a duplicate please send I did add pharmacy to patient chart.     Reason for call:   [x] Refill   [] Prior Auth  [] Other:     Office:   [x] PCP/Provider - Kirby Casanova   [] Specialty/Provider -     Medication: Losartan     Dose/Frequency: 25 mg BID    Quantity: 180    Pharmacy: [facility] Taran Carvajal      Does the patient have enough for 3 days?   [] Yes   [x] No - Send as HP to POD

## 2024-12-04 DIAGNOSIS — K59.04 CHRONIC IDIOPATHIC CONSTIPATION: ICD-10-CM

## 2024-12-05 RX ORDER — ALBUTEROL SULFATE 90 UG/1
INHALANT RESPIRATORY (INHALATION)
Qty: 18 G | Refills: 5 | Status: SHIPPED | OUTPATIENT
Start: 2024-12-05

## 2024-12-05 RX ORDER — ASPIRIN 81 MG
TABLET, DELAYED RELEASE (ENTERIC COATED) ORAL
Qty: 30 TABLET | Refills: 5 | Status: SHIPPED | OUTPATIENT
Start: 2024-12-05

## 2024-12-08 DIAGNOSIS — G89.29 CHRONIC BILATERAL LOW BACK PAIN WITHOUT SCIATICA: ICD-10-CM

## 2024-12-08 DIAGNOSIS — M54.50 CHRONIC BILATERAL LOW BACK PAIN WITHOUT SCIATICA: ICD-10-CM

## 2024-12-08 NOTE — ED CARE HANDOFF
Emergency Department Sign Out Note        Sign out and transfer of care from Dr. Franco. See Separate Emergency Department note.     The patient, Severiano Feliciano, was evaluated by the previous provider for copd.    Workup Completed:    CT angio shows no PE.    ED Course / Workup Pending (followup):  Patient with shortness of breath and hypercapnia, CT angiography shows no pulmonary embolism or pneumonia on the chest.  Had a discussion with patient and family about admission but patient refused.  Patient was offered BiPAP and high flow nasal cannula patient refused both of these.  Patient and family preferred further treatment at home, started on steroids for COPD exacerbation.                                     Procedures  Medical Decision Making  Amount and/or Complexity of Data Reviewed  Labs: ordered.  Radiology: ordered.    Risk  OTC drugs.  Prescription drug management.            Disposition  Final diagnoses:   COPD exacerbation (HCC)     Time reflects when diagnosis was documented in both MDM as applicable and the Disposition within this note       Time User Action Codes Description Comment    12/27/2023 11:37 PM Gisel Carl Add [J44.1] COPD exacerbation (HCC)           ED Disposition       ED Disposition   Discharge    Condition   Stable    Date/Time   Wed Dec 27, 2023 11:37 PM    Comment   Severiano Feliciano discharge to home/self care.                   Follow-up Information       Follow up With Specialties Details Why Contact Info    Kirby Casanova MD Family Medicine   [address] [place] [address]  [phone]            Patient's Medications   Discharge Prescriptions    PREDNISONE 50 MG TABLET    Take 1 tablet (50 mg total) by mouth daily for 4 days       Start Date: 12/27/2023End Date: 12/31/2023       Order Dose: 50 mg       Quantity: 4 tablet    Refills: 0     No discharge procedures on file.       ED Provider  Electronically Signed by     Gisel Carl  MD  12/28/23 0130     Principal Discharge DX:	Acute UTI   1

## 2024-12-10 RX ORDER — SENNOSIDES 8.6 MG
650 CAPSULE ORAL EVERY 8 HOURS PRN
Qty: 90 TABLET | Refills: 2 | Status: SHIPPED | OUTPATIENT
Start: 2024-12-10

## 2024-12-23 ENCOUNTER — TELEPHONE (OUTPATIENT)
Age: 89
End: 2024-12-23

## 2024-12-23 NOTE — TELEPHONE ENCOUNTER
Please call patient - if they don't have a working tube please have them pass by the office for one of ours as the process with pharmacy may be long - I don't think [facility] can send them oxygen tubes needs to be a DME supply company or pharmacy - it will be sent to them via mail

## 2024-12-23 NOTE — TELEPHONE ENCOUNTER
Patient spouse called, states patient receives oxygen 24/7 , states The tube from machine to nose is not working. Request a new tube, be sent to [facility] - [facility] - [address] [phone] . Please advise Patient at [phone] or [phone], if any further questions.

## 2024-12-28 LAB
DME PARACHUTE DELIVERY DATE ACTUAL: NORMAL
DME PARACHUTE DELIVERY DATE REQUESTED: NORMAL
DME PARACHUTE ITEM DESCRIPTION: NORMAL
DME PARACHUTE ORDER STATUS: NORMAL
DME PARACHUTE SUPPLIER NAME: NORMAL
DME PARACHUTE SUPPLIER PHONE: NORMAL

## 2025-01-15 DIAGNOSIS — E55.9 VITAMIN D DEFICIENCY: ICD-10-CM

## 2025-01-17 RX ORDER — ERGOCALCIFEROL 1.25 MG/1
CAPSULE, LIQUID FILLED ORAL
Qty: 12 CAPSULE | Refills: 3 | Status: SHIPPED | OUTPATIENT
Start: 2025-01-17

## 2025-02-02 ENCOUNTER — HOSPITAL ENCOUNTER (INPATIENT)
Facility: HOSPITAL | Age: 89
LOS: 6 days | Discharge: HOME | DRG: 189 | End: 2025-02-08
Attending: EMERGENCY MEDICINE | Admitting: INTERNAL MEDICINE
Payer: MEDICARE

## 2025-02-02 ENCOUNTER — APPOINTMENT (EMERGENCY)
Dept: CT IMAGING | Facility: HOSPITAL | Age: 89
DRG: 189 | End: 2025-02-02
Payer: MEDICARE

## 2025-02-02 ENCOUNTER — APPOINTMENT (EMERGENCY)
Dept: RADIOLOGY | Facility: HOSPITAL | Age: 89
DRG: 189 | End: 2025-02-02
Payer: MEDICARE

## 2025-02-02 DIAGNOSIS — R41.82 ALTERED MENTAL STATUS: Primary | ICD-10-CM

## 2025-02-02 DIAGNOSIS — I10 PRIMARY HYPERTENSION: ICD-10-CM

## 2025-02-02 DIAGNOSIS — E87.0 HYPERNATREMIA: ICD-10-CM

## 2025-02-02 DIAGNOSIS — J18.9 PNEUMONIA: ICD-10-CM

## 2025-02-02 DIAGNOSIS — J96.12 CHRONIC RESPIRATORY FAILURE WITH HYPOXIA AND HYPERCAPNIA (HCC): ICD-10-CM

## 2025-02-02 DIAGNOSIS — J44.9 COPD, SEVERE (HCC): ICD-10-CM

## 2025-02-02 DIAGNOSIS — J96.11 CHRONIC RESPIRATORY FAILURE WITH HYPOXIA AND HYPERCAPNIA (HCC): ICD-10-CM

## 2025-02-02 PROBLEM — R91.1 SOLITARY PULMONARY NODULE ON LUNG CT: Status: ACTIVE | Noted: 2025-02-02

## 2025-02-02 PROBLEM — R93.89 ABNORMAL CT OF THE CHEST: Status: ACTIVE | Noted: 2025-02-02

## 2025-02-02 LAB
2HR DELTA HS TROPONIN: 2 NG/L
ALBUMIN SERPL BCG-MCNC: 4.1 G/DL (ref 3.5–5)
ALP SERPL-CCNC: 54 U/L (ref 34–104)
ALT SERPL W P-5'-P-CCNC: 13 U/L (ref 7–52)
APTT PPP: 26 SECONDS (ref 23–34)
ARTERIAL PATENCY WRIST A: YES
AST SERPL W P-5'-P-CCNC: 23 U/L (ref 13–39)
B PARAP IS1001 DNA NPH QL NAA+NON-PROBE: NOT DETECTED
B PERT.PT PRMT NPH QL NAA+NON-PROBE: NOT DETECTED
BASE EX.OXY STD BLDV CALC-SCNC: 85.4 % (ref 60–80)
BASE EXCESS BLDA CALC-SCNC: 16 MMOL/L
BASE EXCESS BLDV CALC-SCNC: 18.5 MMOL/L
BASOPHILS # BLD AUTO: 0.03 THOUSANDS/ΜL (ref 0–0.1)
BASOPHILS NFR BLD AUTO: 0 % (ref 0–1)
BILIRUB SERPL-MCNC: 0.68 MG/DL (ref 0.2–1)
BNP SERPL-MCNC: 99 PG/ML (ref 0–100)
BUN SERPL-MCNC: 22 MG/DL (ref 5–25)
C PNEUM DNA NPH QL NAA+NON-PROBE: NOT DETECTED
CA-I BLD-SCNC: 1.11 MMOL/L (ref 1.12–1.32)
CALCIUM SERPL-MCNC: 10.1 MG/DL (ref 8.4–10.2)
CARDIAC TROPONIN I PNL SERPL HS: 23 NG/L (ref ?–50)
CARDIAC TROPONIN I PNL SERPL HS: 25 NG/L (ref ?–50)
CHLORIDE SERPL-SCNC: 93 MMOL/L (ref 96–108)
CO2 SERPL-SCNC: >45 MMOL/L (ref 21–32)
CREAT SERPL-MCNC: 0.81 MG/DL (ref 0.6–1.3)
EOSINOPHIL # BLD AUTO: 0.15 THOUSAND/ΜL (ref 0–0.61)
EOSINOPHIL NFR BLD AUTO: 2 % (ref 0–6)
ERYTHROCYTE [DISTWIDTH] IN BLOOD BY AUTOMATED COUNT: 13.1 % (ref 11.6–15.1)
FLUAV RNA NPH QL NAA+NON-PROBE: NOT DETECTED
FLUAV RNA RESP QL NAA+PROBE: NEGATIVE
FLUBV RNA NPH QL NAA+NON-PROBE: NOT DETECTED
FLUBV RNA RESP QL NAA+PROBE: NEGATIVE
GFR SERPL CREATININE-BSD FRML MDRD: 79 ML/MIN/1.73SQ M
GLUCOSE SERPL-MCNC: 102 MG/DL (ref 65–140)
HADV DNA NPH QL NAA+NON-PROBE: NOT DETECTED
HCO3 BLDA-SCNC: 44.4 MMOL/L (ref 22–28)
HCO3 BLDV-SCNC: 45.6 MMOL/L (ref 24–30)
HCOV 229E RNA NPH QL NAA+NON-PROBE: NOT DETECTED
HCOV HKU1 RNA NPH QL NAA+NON-PROBE: NOT DETECTED
HCOV NL63 RNA NPH QL NAA+NON-PROBE: NOT DETECTED
HCOV OC43 RNA NPH QL NAA+NON-PROBE: NOT DETECTED
HCT VFR BLD AUTO: 39.6 % (ref 36.5–49.3)
HGB BLD-MCNC: 11.2 G/DL (ref 12–17)
HMPV RNA NPH QL NAA+NON-PROBE: NOT DETECTED
HPIV1 RNA NPH QL NAA+NON-PROBE: NOT DETECTED
HPIV2 RNA NPH QL NAA+NON-PROBE: NOT DETECTED
HPIV3 RNA NPH QL NAA+NON-PROBE: NOT DETECTED
HPIV4 RNA NPH QL NAA+NON-PROBE: NOT DETECTED
IMM GRANULOCYTES # BLD AUTO: 0.04 THOUSAND/UL (ref 0–0.2)
IMM GRANULOCYTES NFR BLD AUTO: 1 % (ref 0–2)
INR PPP: 1.04 (ref 0.85–1.19)
LACTATE SERPL-SCNC: 1.2 MMOL/L (ref 0.5–2)
LYMPHOCYTES # BLD AUTO: 0.58 THOUSANDS/ΜL (ref 0.6–4.47)
LYMPHOCYTES NFR BLD AUTO: 7 % (ref 14–44)
M PNEUMO DNA NPH QL NAA+NON-PROBE: NOT DETECTED
MAGNESIUM SERPL-MCNC: 2.4 MG/DL (ref 1.9–2.7)
MCH RBC QN AUTO: 31.8 PG (ref 26.8–34.3)
MCHC RBC AUTO-ENTMCNC: 28.3 G/DL (ref 31.4–37.4)
MCV RBC AUTO: 113 FL (ref 82–98)
MONOCYTES # BLD AUTO: 1.02 THOUSAND/ΜL (ref 0.17–1.22)
MONOCYTES NFR BLD AUTO: 13 % (ref 4–12)
NEUTROPHILS # BLD AUTO: 6.31 THOUSANDS/ΜL (ref 1.85–7.62)
NEUTS SEG NFR BLD AUTO: 77 % (ref 43–75)
NON VENT TYPE HFNC: ABNORMAL
NRBC BLD AUTO-RTO: 0 /100 WBCS
O2 CT BLDA-SCNC: 13.6 ML/DL (ref 16–23)
O2 CT BLDV-SCNC: 15 ML/DL
OXYHGB MFR BLDA: 92.7 % (ref 94–97)
PCO2 BLDA: 79.4 MM HG (ref 36–44)
PCO2 BLDV: 65.2 MM HG (ref 42–50)
PH BLDA: 7.37 [PH] (ref 7.35–7.45)
PH BLDV: 7.46 [PH] (ref 7.3–7.4)
PHOSPHATE SERPL-MCNC: 2.4 MG/DL (ref 2.3–4.1)
PLATELET # BLD AUTO: 91 THOUSANDS/UL (ref 149–390)
PMV BLD AUTO: 9.5 FL (ref 8.9–12.7)
PO2 BLDA: 69.3 MM HG (ref 75–129)
PO2 BLDV: 51.3 MM HG (ref 35–45)
POTASSIUM SERPL-SCNC: 4.2 MMOL/L (ref 3.5–5.3)
PROCALCITONIN SERPL-MCNC: 0.11 NG/ML
PROT SERPL-MCNC: 7.6 G/DL (ref 6.4–8.4)
PROTHROMBIN TIME: 13.8 SECONDS (ref 12.3–15)
RBC # BLD AUTO: 3.52 MILLION/UL (ref 3.88–5.62)
RSV RNA NPH QL NAA+NON-PROBE: NOT DETECTED
RSV RNA RESP QL NAA+PROBE: NEGATIVE
RV+EV RNA NPH QL NAA+NON-PROBE: NOT DETECTED
SARS-COV-2 RNA NPH QL NAA+NON-PROBE: NOT DETECTED
SARS-COV-2 RNA RESP QL NAA+PROBE: NEGATIVE
SODIUM SERPL-SCNC: 146 MMOL/L (ref 135–147)
SPECIMEN SOURCE: ABNORMAL
WBC # BLD AUTO: 8.13 THOUSAND/UL (ref 4.31–10.16)

## 2025-02-02 PROCEDURE — 84484 ASSAY OF TROPONIN QUANT: CPT

## 2025-02-02 PROCEDURE — 94002 VENT MGMT INPAT INIT DAY: CPT

## 2025-02-02 PROCEDURE — 94760 N-INVAS EAR/PLS OXIMETRY 1: CPT

## 2025-02-02 PROCEDURE — 85025 COMPLETE CBC W/AUTO DIFF WBC: CPT

## 2025-02-02 PROCEDURE — 83735 ASSAY OF MAGNESIUM: CPT

## 2025-02-02 PROCEDURE — 72125 CT NECK SPINE W/O DYE: CPT

## 2025-02-02 PROCEDURE — 87040 BLOOD CULTURE FOR BACTERIA: CPT

## 2025-02-02 PROCEDURE — 96368 THER/DIAG CONCURRENT INF: CPT

## 2025-02-02 PROCEDURE — 93005 ELECTROCARDIOGRAM TRACING: CPT

## 2025-02-02 PROCEDURE — 71045 X-RAY EXAM CHEST 1 VIEW: CPT

## 2025-02-02 PROCEDURE — 83605 ASSAY OF LACTIC ACID: CPT

## 2025-02-02 PROCEDURE — 0202U NFCT DS 22 TRGT SARS-COV-2: CPT

## 2025-02-02 PROCEDURE — 96365 THER/PROPH/DIAG IV INF INIT: CPT

## 2025-02-02 PROCEDURE — 96367 TX/PROPH/DG ADDL SEQ IV INF: CPT

## 2025-02-02 PROCEDURE — 99223 1ST HOSP IP/OBS HIGH 75: CPT | Performed by: INTERNAL MEDICINE

## 2025-02-02 PROCEDURE — 94762 N-INVAS EAR/PLS OXIMTRY CONT: CPT

## 2025-02-02 PROCEDURE — 74177 CT ABD & PELVIS W/CONTRAST: CPT

## 2025-02-02 PROCEDURE — 99285 EMERGENCY DEPT VISIT HI MDM: CPT

## 2025-02-02 PROCEDURE — 0241U HB NFCT DS VIR RESP RNA 4 TRGT: CPT

## 2025-02-02 PROCEDURE — 99291 CRITICAL CARE FIRST HOUR: CPT | Performed by: EMERGENCY MEDICINE

## 2025-02-02 PROCEDURE — 36600 WITHDRAWAL OF ARTERIAL BLOOD: CPT

## 2025-02-02 PROCEDURE — 83880 ASSAY OF NATRIURETIC PEPTIDE: CPT

## 2025-02-02 PROCEDURE — 71260 CT THORAX DX C+: CPT

## 2025-02-02 PROCEDURE — 94640 AIRWAY INHALATION TREATMENT: CPT

## 2025-02-02 PROCEDURE — 82330 ASSAY OF CALCIUM: CPT

## 2025-02-02 PROCEDURE — 84100 ASSAY OF PHOSPHORUS: CPT

## 2025-02-02 PROCEDURE — 70450 CT HEAD/BRAIN W/O DYE: CPT

## 2025-02-02 PROCEDURE — 82805 BLOOD GASES W/O2 SATURATION: CPT

## 2025-02-02 PROCEDURE — 99255 IP/OBS CONSLTJ NEW/EST HI 80: CPT | Performed by: INTERNAL MEDICINE

## 2025-02-02 PROCEDURE — 82805 BLOOD GASES W/O2 SATURATION: CPT | Performed by: INTERNAL MEDICINE

## 2025-02-02 PROCEDURE — 85610 PROTHROMBIN TIME: CPT

## 2025-02-02 PROCEDURE — 80053 COMPREHEN METABOLIC PANEL: CPT

## 2025-02-02 PROCEDURE — 36415 COLL VENOUS BLD VENIPUNCTURE: CPT

## 2025-02-02 PROCEDURE — 84145 PROCALCITONIN (PCT): CPT

## 2025-02-02 PROCEDURE — 94664 DEMO&/EVAL PT USE INHALER: CPT

## 2025-02-02 PROCEDURE — 85730 THROMBOPLASTIN TIME PARTIAL: CPT

## 2025-02-02 RX ORDER — ALBUTEROL SULFATE 5 MG/ML
10 SOLUTION RESPIRATORY (INHALATION) ONCE
Status: DISCONTINUED | OUTPATIENT
Start: 2025-02-02 | End: 2025-02-03

## 2025-02-02 RX ORDER — ALBUTEROL SULFATE 0.83 MG/ML
SOLUTION RESPIRATORY (INHALATION)
Status: COMPLETED
Start: 2025-02-02 | End: 2025-02-02

## 2025-02-02 RX ORDER — GUAIFENESIN 100 MG/5ML
200 SOLUTION ORAL EVERY 4 HOURS PRN
Status: DISCONTINUED | OUTPATIENT
Start: 2025-02-02 | End: 2025-02-08 | Stop reason: HOSPADM

## 2025-02-02 RX ORDER — SODIUM CHLORIDE FOR INHALATION 0.9 %
12 VIAL, NEBULIZER (ML) INHALATION ONCE
Status: COMPLETED | OUTPATIENT
Start: 2025-02-02 | End: 2025-02-02

## 2025-02-02 RX ORDER — SODIUM CHLORIDE 9 MG/ML
50 INJECTION, SOLUTION INTRAVENOUS CONTINUOUS
Status: DISCONTINUED | OUTPATIENT
Start: 2025-02-02 | End: 2025-02-03

## 2025-02-02 RX ORDER — SODIUM CHLORIDE, SODIUM GLUCONATE, SODIUM ACETATE, POTASSIUM CHLORIDE, MAGNESIUM CHLORIDE, SODIUM PHOSPHATE, DIBASIC, AND POTASSIUM PHOSPHATE .53; .5; .37; .037; .03; .012; .00082 G/100ML; G/100ML; G/100ML; G/100ML; G/100ML; G/100ML; G/100ML
1000 INJECTION, SOLUTION INTRAVENOUS ONCE
Status: COMPLETED | OUTPATIENT
Start: 2025-02-02 | End: 2025-02-02

## 2025-02-02 RX ORDER — SODIUM CHLORIDE FOR INHALATION 0.9 %
3 VIAL, NEBULIZER (ML) INHALATION
Status: DISCONTINUED | OUTPATIENT
Start: 2025-02-02 | End: 2025-02-03

## 2025-02-02 RX ORDER — LEVALBUTEROL INHALATION SOLUTION 1.25 MG/3ML
1.25 SOLUTION RESPIRATORY (INHALATION)
Status: DISCONTINUED | OUTPATIENT
Start: 2025-02-02 | End: 2025-02-08 | Stop reason: HOSPADM

## 2025-02-02 RX ORDER — ACETAMINOPHEN 10 MG/ML
1000 INJECTION, SOLUTION INTRAVENOUS ONCE
Status: COMPLETED | OUTPATIENT
Start: 2025-02-02 | End: 2025-02-02

## 2025-02-02 RX ORDER — ENOXAPARIN SODIUM 100 MG/ML
40 INJECTION SUBCUTANEOUS DAILY
Status: DISCONTINUED | OUTPATIENT
Start: 2025-02-02 | End: 2025-02-08 | Stop reason: HOSPADM

## 2025-02-02 RX ORDER — DIVALPROEX SODIUM 125 MG/1
125 CAPSULE, COATED PELLETS ORAL
Status: DISCONTINUED | OUTPATIENT
Start: 2025-02-02 | End: 2025-02-08 | Stop reason: HOSPADM

## 2025-02-02 RX ORDER — DIVALPROEX SODIUM 125 MG/1
125 CAPSULE, COATED PELLETS ORAL
Status: DISCONTINUED | OUTPATIENT
Start: 2025-02-02 | End: 2025-02-02

## 2025-02-02 RX ORDER — MAGNESIUM HYDROXIDE/ALUMINUM HYDROXICE/SIMETHICONE 120; 1200; 1200 MG/30ML; MG/30ML; MG/30ML
30 SUSPENSION ORAL EVERY 4 HOURS PRN
Status: DISCONTINUED | OUTPATIENT
Start: 2025-02-02 | End: 2025-02-08 | Stop reason: HOSPADM

## 2025-02-02 RX ORDER — METHYLPREDNISOLONE SODIUM SUCCINATE 40 MG/ML
40 INJECTION, POWDER, LYOPHILIZED, FOR SOLUTION INTRAMUSCULAR; INTRAVENOUS EVERY 8 HOURS
Status: DISCONTINUED | OUTPATIENT
Start: 2025-02-02 | End: 2025-02-04

## 2025-02-02 RX ORDER — MAGNESIUM SULFATE HEPTAHYDRATE 40 MG/ML
4 INJECTION, SOLUTION INTRAVENOUS ONCE
Status: DISCONTINUED | OUTPATIENT
Start: 2025-02-02 | End: 2025-02-02

## 2025-02-02 RX ORDER — MAGNESIUM SULFATE HEPTAHYDRATE 40 MG/ML
4 INJECTION, SOLUTION INTRAVENOUS ONCE
Status: COMPLETED | OUTPATIENT
Start: 2025-02-02 | End: 2025-02-02

## 2025-02-02 RX ORDER — POLYETHYLENE GLYCOL 3350 17 G/17G
17 POWDER, FOR SOLUTION ORAL DAILY PRN
Status: DISCONTINUED | OUTPATIENT
Start: 2025-02-02 | End: 2025-02-08 | Stop reason: HOSPADM

## 2025-02-02 RX ORDER — ACETAMINOPHEN 325 MG/1
975 TABLET ORAL EVERY 8 HOURS PRN
Status: DISCONTINUED | OUTPATIENT
Start: 2025-02-02 | End: 2025-02-08 | Stop reason: HOSPADM

## 2025-02-02 RX ADMIN — SODIUM CHLORIDE, SODIUM GLUCONATE, SODIUM ACETATE, POTASSIUM CHLORIDE, MAGNESIUM CHLORIDE, SODIUM PHOSPHATE, DIBASIC, AND POTASSIUM PHOSPHATE 1000 ML: .53; .5; .37; .037; .03; .012; .00082 INJECTION, SOLUTION INTRAVENOUS at 05:03

## 2025-02-02 RX ADMIN — ALBUTEROL SULFATE: 2.5 SOLUTION RESPIRATORY (INHALATION) at 05:00

## 2025-02-02 RX ADMIN — IPRATROPIUM BROMIDE 0.5 MG: 0.5 SOLUTION RESPIRATORY (INHALATION) at 14:04

## 2025-02-02 RX ADMIN — LEVALBUTEROL HYDROCHLORIDE 1.25 MG: 1.25 SOLUTION RESPIRATORY (INHALATION) at 14:04

## 2025-02-02 RX ADMIN — ISODIUM CHLORIDE 12 ML: 0.03 SOLUTION RESPIRATORY (INHALATION) at 05:00

## 2025-02-02 RX ADMIN — LEVALBUTEROL HYDROCHLORIDE 1.25 MG: 1.25 SOLUTION RESPIRATORY (INHALATION) at 20:14

## 2025-02-02 RX ADMIN — SODIUM CHLORIDE 125 MG: 9 INJECTION, SOLUTION INTRAVENOUS at 20:21

## 2025-02-02 RX ADMIN — ENOXAPARIN SODIUM 40 MG: 40 INJECTION SUBCUTANEOUS at 11:59

## 2025-02-02 RX ADMIN — METHYLPREDNISOLONE SODIUM SUCCINATE 40 MG: 40 INJECTION, POWDER, FOR SOLUTION INTRAMUSCULAR; INTRAVENOUS at 11:55

## 2025-02-02 RX ADMIN — IPRATROPIUM BROMIDE 0.5 MG: 0.5 SOLUTION RESPIRATORY (INHALATION) at 20:14

## 2025-02-02 RX ADMIN — SODIUM CHLORIDE, SODIUM GLUCONATE, SODIUM ACETATE, POTASSIUM CHLORIDE, MAGNESIUM CHLORIDE, SODIUM PHOSPHATE, DIBASIC, AND POTASSIUM PHOSPHATE 1000 ML: .53; .5; .37; .037; .03; .012; .00082 INJECTION, SOLUTION INTRAVENOUS at 05:43

## 2025-02-02 RX ADMIN — Medication 1 MG: at 05:00

## 2025-02-02 RX ADMIN — IOHEXOL 100 ML: 350 INJECTION, SOLUTION INTRAVENOUS at 07:06

## 2025-02-02 RX ADMIN — IPRATROPIUM BROMIDE 1 MG: 0.5 SOLUTION RESPIRATORY (INHALATION) at 05:00

## 2025-02-02 RX ADMIN — SODIUM CHLORIDE 50 ML/HR: 0.9 INJECTION, SOLUTION INTRAVENOUS at 11:49

## 2025-02-02 RX ADMIN — METHYLPREDNISOLONE SODIUM SUCCINATE 40 MG: 40 INJECTION, POWDER, FOR SOLUTION INTRAMUSCULAR; INTRAVENOUS at 19:37

## 2025-02-02 RX ADMIN — ISODIUM CHLORIDE 3 ML: 0.03 SOLUTION RESPIRATORY (INHALATION) at 20:14

## 2025-02-02 RX ADMIN — ISODIUM CHLORIDE 3 ML: 0.03 SOLUTION RESPIRATORY (INHALATION) at 14:04

## 2025-02-02 RX ADMIN — CEFTRIAXONE 1000 MG: 10 INJECTION, POWDER, FOR SOLUTION INTRAVENOUS at 18:32

## 2025-02-02 RX ADMIN — MAGNESIUM SULFATE HEPTAHYDRATE 4 G: 40 INJECTION, SOLUTION INTRAVENOUS at 05:29

## 2025-02-02 RX ADMIN — CEFEPIME 2000 MG: 2 INJECTION, POWDER, FOR SOLUTION INTRAVENOUS at 06:24

## 2025-02-02 RX ADMIN — ACETAMINOPHEN 1000 MG: 10 INJECTION INTRAVENOUS at 05:53

## 2025-02-02 NOTE — ED NOTES
Per Dr Alegria, patient trialed off of bipap and placed on mid flow 8 L at this time. Patient on midflow for about 10 minutes, oxygen saturation down into the mid 70s with increased WOB. Respiratory called and patient to be placed on high flow at this time.      Jossy Mireles RN  02/02/25 [id number]

## 2025-02-02 NOTE — ED NOTES
Patient placed on high flow at this time per ICU MDs. Patient seems to be tolerating high flow appropriately.      Jossy Mireles RN  02/02/25 0912

## 2025-02-02 NOTE — ASSESSMENT & PLAN NOTE
8 x 5 mm right middle lobe nodule, most recently 5 x 3 mm (3/163) and about 4 x 2 mm in January 2024.   - outpt follow up. Given age and severe co-morbidities, this likely does not need to be followed.    Discussed with pt and family. Concerns addressed.

## 2025-02-02 NOTE — ASSESSMENT & PLAN NOTE
Wt Readings from Last 3 Encounters:   02/02/25 60.3 kg (132 lb 15 oz)   11/05/24 60.1 kg (132 lb 9.6 oz)   09/20/24 59.3 kg (130 lb 12.8 oz)     - weight stable, diurese per primary team

## 2025-02-02 NOTE — H&P
Unit/Bed#: ED-15 Encounter: [id number]    Chief complaint: Lethargy    History of Present Illness     HPI:  Severiano Feliciano is a 89 y.o. male who has a history of severe COPD and chronic hypoxic and hypercapnic respiratory failure.  He was in his usual state of health until yesterday.  At that time he began to cough.  He seemed more short of breath.  He was given some Tylenol and seemed to improve.  However this morning, he seemed to take a turn for the worse.  The ambulance was summoned and he was brought to the hospital.  Upon arrival, he was noted to be obtunded.  He was significantly hypoxic.  He required high flow oxygen to restore his O2 sat to the 90s.  The patient was placed on BiPAP.  He seemed to improve a bit.  He is admitted for treatment of severe COPD with acute on chronic respiratory failure.    The patient's past medical history is positive for hypertension and type 2 diabetes.  He has a history of COPD congestive heart failure, and coronary artery disease.  He has chronic respiratory failure and has been on supplemental oxygen at home.  He has a history of hypertension.  He has a history of vitamin D and vitamin B12 deficiencies.  He has been on BiPAP at home.    The patient's medications at home include:  Albuterol by metered-dose inhaler every 6 hours as needed  Brimonidine tartrate 0.2% 1 drop left eye twice daily  Budesonide glycopyrrolate formoterol inhaler 2 puffs twice daily  Budesonide 0.5 mg by nebulization twice daily  Vitamin D 50,000 units weekly  DuoNeb twice daily  Losartan 25 mg twice daily  Mometasone as needed  Pantoprazole 40 mg daily  Prednisone 5 mg daily  Torsemide 5 mg daily    The patient is allergic to penicillin lisinopril Lasix and Zyprexa.    Family History   Problem Relation Age of Onset    Tuberculosis Mother     No Known Problems Father     Cancer Sister     Diabetes Family     Hypertension Family        Social history reveals that the patient is  and  "lives with his wife.  He is a former smoker.  He does not currently imbibe ethanol nor does he use illicit drugs.    Review of systems could not be obtained with clarity because of the patient's mental status.      Objective   Vitals: Blood pressure 130/60, pulse 77, temperature 98.8 °F (37.1 °C), temperature source Oral, resp. rate 20, height 5' 2.99\" (1.6 m), weight 60.3 kg (132 lb 15 oz), SpO2 91%.    Physical Exam   The patient is a well-developed, frail man who is obtunded and appears short of breath.  Head is atraumatic and normocephalic.  ENT examination is unrevealing.  Eyes show the pupils to be equal, round, and reactive to light.  Extraocular movements are intact.  Neck is supple.  Carotids are full without bruits.  There is no lymphadenopathy or goiter.  Lungs reveal markedly diminished breath sounds bilaterally.  I could hear no wheezing, rales, or rhonchi.  Cardiac exam revealed a regular rhythm.  I heard no murmur or gallop.  The abdomen is soft with active bowel sounds.  There is no mass or tenderness.  Extremities showed no clubbing, cyanosis, or edema.  No calf tenderness was noted.  Neurologic examination revealed the patient to be lethargic but arousable he was able to move all limbs.    Lab Results:   Results for orders placed or performed during the hospital encounter of 02/02/25   Blood culture #2    Collection Time: 02/02/25  4:56 AM    Specimen: Arm, Right; Blood   Result Value Ref Range    Blood Culture Received in Microbiology Lab. Culture in Progress.    FLU/RSV/COVID - if FLU/RSV clinically relevant    Collection Time: 02/02/25  4:56 AM    Specimen: Nose; Nares   Result Value Ref Range    SARS-CoV-2 Negative Negative    INFLUENZA A PCR Negative Negative    INFLUENZA B PCR Negative Negative    RSV PCR Negative Negative   CBC and differential    Collection Time: 02/02/25  4:56 AM   Result Value Ref Range    WBC 8.13 4.31 - 10.16 Thousand/uL    RBC 3.52 (L) 3.88 - 5.62 Million/uL    " Hemoglobin 11.2 (L) 12.0 - 17.0 g/dL    Hematocrit 39.6 36.5 - 49.3 %     (H) 82 - 98 fL    MCH 31.8 26.8 - 34.3 pg    MCHC 28.3 (L) 31.4 - 37.4 g/dL    RDW 13.1 11.6 - 15.1 %    MPV 9.5 8.9 - 12.7 fL    Platelets 91 (L) 149 - 390 Thousands/uL    nRBC 0 /100 WBCs    Segmented % 77 (H) 43 - 75 %    Immature Grans % 1 0 - 2 %    Lymphocytes % 7 (L) 14 - 44 %    Monocytes % 13 (H) 4 - 12 %    Eosinophils Relative 2 0 - 6 %    Basophils Relative 0 0 - 1 %    Absolute Neutrophils 6.31 1.85 - 7.62 Thousands/µL    Absolute Immature Grans 0.04 0.00 - 0.20 Thousand/uL    Absolute Lymphocytes 0.58 (L) 0.60 - 4.47 Thousands/µL    Absolute Monocytes 1.02 0.17 - 1.22 Thousand/µL    Eosinophils Absolute 0.15 0.00 - 0.61 Thousand/µL    Basophils Absolute 0.03 0.00 - 0.10 Thousands/µL   Comprehensive metabolic panel    Collection Time: 02/02/25  4:56 AM   Result Value Ref Range    Sodium 146 135 - 147 mmol/L    Potassium 4.2 3.5 - 5.3 mmol/L    Chloride 93 (L) 96 - 108 mmol/L    CO2 >45 (H) 21 - 32 mmol/L    ANION GAP      BUN 22 5 - 25 mg/dL    Creatinine 0.81 0.60 - 1.30 mg/dL    Glucose 102 65 - 140 mg/dL    Calcium 10.1 8.4 - 10.2 mg/dL    AST 23 13 - 39 U/L    ALT 13 7 - 52 U/L    Alkaline Phosphatase 54 34 - 104 U/L    Total Protein 7.6 6.4 - 8.4 g/dL    Albumin 4.1 3.5 - 5.0 g/dL    Total Bilirubin 0.68 0.20 - 1.00 mg/dL    eGFR 79 ml/min/1.73sq m   Lactic acid    Collection Time: 02/02/25  4:56 AM   Result Value Ref Range    LACTIC ACID 1.2 0.5 - 2.0 mmol/L   Procalcitonin    Collection Time: 02/02/25  4:56 AM   Result Value Ref Range    Procalcitonin 0.11 <=0.25 ng/ml   Protime-INR    Collection Time: 02/02/25  4:56 AM   Result Value Ref Range    Protime 13.8 12.3 - 15.0 seconds    INR 1.04 0.85 - 1.19   APTT    Collection Time: 02/02/25  4:56 AM   Result Value Ref Range    PTT 26 23 - 34 seconds   B-Type Natriuretic Peptide(BNP)    Collection Time: 02/02/25  4:56 AM   Result Value Ref Range    BNP 99 0 - 100 pg/mL  "  Blood gas, Venous    Collection Time: 02/02/25  4:56 AM   Result Value Ref Range    pH, Nabeel 7.463 (H) 7.300 - 7.400    pCO2, Nabeel 65.2 (H) 42.0 - 50.0 mm Hg    pO2, Nabeel 51.3 (H) 35.0 - 45.0 mm Hg    HCO3, Nabeel 45.6 (H) 24 - 30 mmol/L    Base Excess, Nabeel 18.5 mmol/L    O2 Content, Nabeel 15.0 ml/dL    O2 HGB, VENOUS 85.4 (H) 60.0 - 80.0 %   HS Troponin 0hr (reflex protocol)    Collection Time: 02/02/25  4:56 AM   Result Value Ref Range    hs TnI 0hr 23 \"Refer to ACS Flowchart\"- see link ng/L   ECG 12 lead    Collection Time: 02/02/25  4:56 AM   Result Value Ref Range    Ventricular Rate 100 BPM    Atrial Rate 100 BPM    TN Interval 156 ms    QRSD Interval 84 ms    QT Interval 342 ms    QTC Interval 441 ms    P Axis 65 degrees    QRS Axis 76 degrees    T Wave Axis 70 degrees   Blood culture #1    Collection Time: 02/02/25  4:58 AM    Specimen: Arm, Left; Blood   Result Value Ref Range    Blood Culture Received in Microbiology Lab. Culture in Progress.    Magnesium    Collection Time: 02/02/25  5:38 AM   Result Value Ref Range    Magnesium 2.4 1.9 - 2.7 mg/dL   Calcium, ionized    Collection Time: 02/02/25  5:38 AM   Result Value Ref Range    Calcium, Ionized 1.11 (L) 1.12 - 1.32 mmol/L   Phosphorus    Collection Time: 02/02/25  5:38 AM   Result Value Ref Range    Phosphorus 2.4 2.3 - 4.1 mg/dL   Respiratory Panel 2.1(RP2)with COVID19    Collection Time: 02/02/25  6:10 AM    Specimen: Nasopharyngeal Swab   Result Value Ref Range    Adenovirus Not Detected Not Detected    Bordetella parapertussis Not Detected Not Detected    Bordetella pertussis Not Detected Not Detected    Chlamydia pneumoniae Not Detected Not detected    SARS-CoV-2 Not Detected Not Detected    Coronavirus 229E Not Detected Not Detected    Coronavirus HKU1 Not Detected Not Detected    Coronavirus NL63 Not Detected Not Detected    Coronavirus OC43 Not Detected Not Detected    Human Metapneumovirus Not Detected Not Detected    Rhino/Enterovirus Not Detected " "Not Detected    Influenza A Not Detected Not Detected    Influenza B Not Detected No Detected    Mycoplasma pneumoniae Not Detected Not Detected    Parainfluenza 1 Not Detected Not Detected    Parainfluenza 2 Not Detected Not Detected    Parainfluenza 3 Not Detected Not Detected    Parainfluenza 4 Not Detected Not Detected    Respiratory Syncytial Virus Not Detected Not Detected   HS Troponin I 2hr    Collection Time: 02/02/25  7:24 AM   Result Value Ref Range    hs TnI 2hr 25 \"Refer to ACS Flowchart\"- see link ng/L    Delta 2hr hsTnI 2 <20 ng/L   ECG 12 lead    Collection Time: 02/02/25  7:25 AM   Result Value Ref Range    Ventricular Rate 91 BPM    Atrial Rate 91 BPM    MI Interval 156 ms    QRSD Interval 84 ms    QT Interval 350 ms    QTC Interval 430 ms    P Axis 65 degrees    QRS Axis 82 degrees    T Wave Axis 82 degrees   Blood gas, arterial    Collection Time: 02/02/25 11:43 AM   Result Value Ref Range    pH, Arterial 7.365 7.350 - 7.450    pCO2, Arterial 79.4 (HH) 36.0 - 44.0 mm Hg    pO2, Arterial 69.3 (L) 75.0 - 129.0 mm Hg    HCO3, Arterial 44.4 (H) 22.0 - 28.0 mmol/L    Base Excess, Arterial 16.0 mmol/L    O2 Content, Arterial 13.6 (L) 16.0 - 23.0 mL/dL    O2 HGB,Arterial  92.7 (L) 94.0 - 97.0 %    SOURCE Radial, Left     AVINASH TEST Yes     Non Vent Type- HFNC HFNC Flow      *Note: Due to a large number of results and/or encounters for the requested time period, some results have not been displayed. A complete set of results can be found in Results Review.       Assessment:  1.  Acute on chronic hypoxic and hypercarbic respiratory failure  2.  Exacerbation of severe COPD  3.  Chronic diastolic congestive heart failure  4.  Hypertension  5.  Hypercholesterolemia  6.  Coronary artery disease  7.  Vitamin B12 deficiency  8.  Vitamin D deficiency    Plan:  The patient will be admitted to the hospital and continued on high flow oxygen.  He will be treated with intravenous steroids, intensive bronchodilator " therapy, and antibiotics.  Pulmonary evaluation will be obtained.  The patient's underlying COPD is severe and considering his extreme oxygen requirements, his prognosis appears to be poor.    I discussed these issues with the patient's wife and children.  Nevertheless, they state that he would prefer to be intubated should that need arise.  He is therefore assigned to CODE BLUE level 1.    In light of the above information, I believe that the patient will remain in the hospital 2 or more midnights.  He is assigned to inpatient status.        Code Status: Level 1 - Full Code

## 2025-02-02 NOTE — ED CARE HANDOFF
Emergency Department Sign Out Note        Sign out and transfer of care from Dr. Hines. See Separate Emergency Department note.     The patient, Severiano Feliciano, was evaluated by the previous provider for Fever, Respiratory failure, altered mental status.    Workup Completed:  Labs    ED Course / Workup Pending (followup):  Imaging    0821 - From sign out, patient does not flag for sepsis has received IV fluids and antibiotics, pending CT imaging. He is more alert than on arrival, waving and responsive. Will try on medium flow to determine need for admission on ICU vs med-surg.                                     Procedures  Medical Decision Making  Amount and/or Complexity of Data Reviewed  Labs: ordered.  Radiology: ordered and independent interpretation performed.    Risk  Prescription drug management.            Disposition  Final diagnoses:   None     ED Disposition       None          Follow-up Information    None       Patient's Medications   Discharge Prescriptions    No medications on file     No discharge procedures on file.       ED Provider  Electronically Signed by     Travis Alegria MD  02/03/25 0963

## 2025-02-02 NOTE — ED PROVIDER NOTES
Time reflects when diagnosis was documented in both MDM as applicable and the Disposition within this note       Time User Action Codes Description Comment    2/2/2025  9:56 AM Travis Alegria [R41.82] Altered mental status     2/2/2025  9:56 AM Travis Alegria [J18.9] Pneumonia     2/2/2025 11:12 AM Phan Lutz Add [J96.11,  J96.12] Chronic respiratory failure with hypoxia and hypercapnia (HCC)     2/2/2025 11:20 AM Phan Lutz [J44.9] COPD, severe (HCC)           ED Disposition       ED Disposition   Admit    Condition   Stable    Date/Time   Sun Feb 2, 2025  9:56 AM    Comment   Case was discussed with [facility] and the patient's admission status was agreed to be Admission Status: inpatient status to the service of Dr. Lutz .               Assessment & Plan       Medical Decision Making  Patient arrives emergency department in respiratory distress, on 15 L nonrebreather pulse ox about 88% with increased work of breathing.  He was immediately transitioned to BiPAP with slight improvement in work of breathing and pulse oximetry.  Lungs with coarse breath sounds in both bases but moving good air.  Patient GCS 9.  He was protecting his airway well enough for safe use of BiPAP.  About 30 minutes after arrival there was a questionable period of torsades de points on ECG.  Patient was placed on the defibrillator pads and given four gram bolus of magnesium, questionable arrhythmia did not return.  After being on BiPAP for about 1 hour there was no significant improvement in his mental status, VBG showing CO2 approximately his baseline.  Laboratory values not pointing towards severe sepsis or another cause of his altered mental status.  Broad-spectrum antibiotics were started given his fever and altered mental status.  After stabilization he was sent to CT scanner.  CT showing no acute intercranial abnormality.  CT chest abdomen pelvis with possible pneumonia versus emphysema versus atelectasis.  Unsure  "as to exact cause of patient's altered mental status at this time.  Likely multifactorial in the setting of viral illness, pneumonia, COPD exacerbation, CPAP noncompliance at home as well as medication noncompliance.  Patient will be admitted to the inpatient medical service at our facility for further observation and stabilization.  Patient remained hemodynamically stable while in the emergency department on BiPAP.    Differential diagnosis includes but is not limited to: Sepsis, pneumonia, heart failure, COPD exacerbation, electrolyte abnormality, anemia, pleural effusion, large, viral illness    On patient arrival I spoke with his wife on the phone using a family member as a  and she clarified that she would like him to be a level 1 code \"do everything you can to keep him alive\" the severity of his condition was communicated to her.    I will order appropriate testing to narrow my differential    Unless otherwise noted:  - There is no language barrier  - Chart was reviewed   - Labs and imaging were reviewed    Amount and/or Complexity of Data Reviewed  Labs: ordered.  Radiology: ordered and independent interpretation performed.    Risk  Prescription drug management.  Decision regarding hospitalization.             Medications   albuterol inhalation solution 10 mg ( Nebulization Canceled Entry 2/2/25 0500)   multi-electrolyte (ISOLYTE-S PH 7.4) bolus 1,000 mL (1,000 mL Intravenous New Bag 2/2/25 0503)     Followed by   multi-electrolyte (ISOLYTE-S PH 7.4) bolus 1,000 mL (has no administration in time range)   ipratropium (ATROVENT) 0.02 % inhalation solution 1 mg (1 mg Nebulization Given 2/2/25 0500)   sodium chloride 0.9 % inhalation solution 12 mL (12 mL Nebulization Given 2/2/25 0500)   albuterol (2.5 mg/3 mL) 0.083 % inhalation solution **ADS Override Pull** (  Given 2/2/25 0500)       ED Risk Strat Scores                          SBIRT 22yo+      Flowsheet Row Most Recent Value   Initial Alcohol " Screen: US AUDIT-C     1. How often do you have a drink containing alcohol? 0 Filed at: 02/02/2025 0727   2. How many drinks containing alcohol do you have on a typical day you are drinking?  0 Filed at: 02/02/2025 0727   3b. FEMALE Any Age, or MALE 65+: How often do you have 4 or more drinks on one occassion? 0 Filed at: 02/02/2025 0727   Audit-C Score 0 Filed at: 02/02/2025 0727   ALAN: How many times in the past year have you...    Used an illegal drug or used a prescription medication for non-medical reasons? Never Filed at: 02/02/2025 0727                            History of Present Illness       Chief Complaint   Patient presents with    Altered Mental Status     Pt BIB EMS. Pt had fevers today and received tylenol. Before calling 911 the patient became unresponsive to verbal stimuli and having trouble breathing. Room air sats initially 75%.       Past Medical History:   Diagnosis Date    Acute metabolic encephalopathy 06/13/2020    Age-related cataract of right eye 04/04/2023    patient is medically cleared and presents with low risk of complication with this upcoming R cataract surgery.    Anemia     Aneurysm, aorta, thoracic (HCC)     Appendicolith     Ascending aortic aneurysm (HCC)     3.7    Asthma     BPH (benign prostatic hyperplasia)     CAD (coronary artery disease)     noted on CT scan    CHF (congestive heart failure) (HCC)     COPD (chronic obstructive pulmonary disease) (HCC)     Delirium 04/25/2024    Descending thoracic aortic aneurysm (HCC)     Diabetes mellitus (HCC)     Diverticulosis     Former tobacco use     GERD (gastroesophageal reflux disease)     History of DVT (deep vein thrombosis)     Left leg    History of transfusion     Hypertension     Hypoxemia 04/30/2023    Inguinal hernia     right    Nephrolithiasis     Oxygen dependent     2LNC    Oxygen dependent     Pneumonia     Pre-diabetes     Prostate calculus     PVD (peripheral vascular disease) (HCC)     Recurrent right inguinal  hernia w incarcertion 2023    SIRS (systemic inflammatory response syndrome) (HCC) 2024    Solitary pulmonary nodule on lung CT 2025    8 x 5 mm right middle lobe nodule, most recently 5 x 3 mm (3/163) and about 4 x 2 mm in 2024.       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)     Varicose vein of leg     b/l      Past Surgical History:   Procedure Laterality Date    CARDIAC SURGERY      ESOPHAGOGASTRODUODENOSCOPY      HERNIA REPAIR Right 2019    Procedure: REPAIR HERNIA INGUINAL WITH MESH;  Surgeon: David Lowry MD;  Location: [facility];  Service: General    HERNIA REPAIR Right 2023    Procedure: REPAIR RECURRENT INCARERATED HERNIA INGUINAL OPEN, RIGHT ORCHIECTOMY;  Surgeon: Mason Bertrand MD;  Location: [facility];  Service: General    INGUINAL HERNIA REPAIR Bilateral     IR TEVAR  2018    CA EVASC RPR DTA COVERAGE ART ORIGIN 1ST ENDOPROSTH N/A 2018    Procedure: TEVAR - endovascular thoracic aortic aneurysm repair;  Surgeon: Gladis Ortega MD;  Location: [facility];  Service: Vascular    THORACIC AORTIC ANEURYSM REPAIR  2018    VARICOSE VEIN SURGERY Bilateral     vein stripping      Family History   Problem Relation Age of Onset    Tuberculosis Mother     No Known Problems Father     Cancer Sister     Diabetes Family     Hypertension Family       Social History     Tobacco Use    Smoking status: Former     Current packs/day: 0.00     Average packs/day: 1 pack/day for 35.0 years (35.0 ttl pk-yrs)     Types: Cigarettes     Start date:      Quit date:      Years since quittin.1    Smokeless tobacco: Never   Vaping Use    Vaping status: Never Used   Substance Use Topics    Alcohol use: Never    Drug use: No      E-Cigarette/Vaping    E-Cigarette Use Never User        E-Cigarette/Vaping Substances    Nicotine No     THC No     CBD No     Flavoring No     Other No     Unknown No       I have reviewed and agree with the history as documented.     Patient is an 89-year-old male with a past medical history of COPD who presents to the emergency department after he developed altered mental status at home, respiratory distress he has had fevers up to 103 for the past several days with productive cough.  EMS found him at home in respiratory distress placed on 15 L nonrebreather with oxygen saturation 85% increased work of breathing.  History of chronic emphysema, noncompliant with CPAP at home and intermittent compliant with medications.        Review of Systems   Reason unable to perform ROS: Altered mental status.           Objective       ED Triage Vitals   Temperature Pulse Blood Pressure Respirations SpO2 Patient Position - Orthostatic VS   02/02/25 0449 02/02/25 0449 02/02/25 0449 02/02/25 0449 02/02/25 0449 02/02/25 0449   (!) 101.2 °F (38.4 °C) 96 141/88 (!) 26 98 % Lying      Temp Source Heart Rate Source BP Location FiO2 (%) Pain Score    02/02/25 0449 02/02/25 0449 02/02/25 0449 02/02/25 1456 02/02/25 0727    Rectal Monitor Left arm 50 No Pain      Vitals      Date and Time Temp Pulse SpO2 Resp BP Pain Score FACES Pain Rating User   02/02/25 2303 100.6 °F (38.1 °C) 86 90 % 28 151/84 -- -- DC   02/02/25 2100 99.6 °F (37.6 °C) 106 94 % 28 169/98 -- -- DC   02/02/25 2014 -- -- 92 % -- -- -- -- MS   02/02/25 1945 -- 93 97 % 28 136/68 -- -- AS   02/02/25 1915 -- 81 93 % 27 131/64 -- -- AS   02/02/25 1845 -- 85 93 % 25 140/74 -- -- AS   02/02/25 1800 -- 84 93 % 25 123/66 -- -- JR   02/02/25 1715 -- 83 92 % 22 142/67 -- -- JR   02/02/25 1615 -- 80 90 % 22 125/62 -- -- JR   02/02/25 1500 -- 78 94 % 22 142/66 -- --    02/02/25 1400 98.8 °F (37.1 °C) 77 91 % 20 130/60 -- --    02/02/25 1315 -- 76 92 % -- 126/60 -- -- HI   02/02/25 1215 -- 76 90 % -- 122/59 -- -- HI   02/02/25 1130  -- 75 91 % 24 111/56 -- -- JR   02/02/25 1045 -- 80 98 % 24 142/63 No Pain --    02/02/25 1030 -- 80 95 % 22 134/59 No Pain --    02/02/25 1015 -- 72 95 % 22 114/54 No Pain --    02/02/25 1000 -- 72 94 % 22 100/49 No Pain --    02/02/25 0945 -- 74 95 % 22 98/47 No Pain --    02/02/25 0915 -- 74 98 % 22 113/53 No Pain --    02/02/25 0815 -- 76 94 % 24 92/49 No Pain --    02/02/25 0745 -- 75 95 % 24 94/44 No Pain --    02/02/25 0727 -- 85 97 % 22 109/55 No Pain --    02/02/25 0630 -- 81 98 % 14 128/62 -- -- AA   02/02/25 0615 -- 83 98 % 16 142/60 -- -- AA   02/02/25 0600 -- 88 96 % 23 144/65 -- -- AA   02/02/25 0550 -- 94 95 % 32 138/80 -- -- AA   02/02/25 0545 -- 91 97 % 28 157/67 -- -- AA   02/02/25 0535 -- 107 95 % 43 170/69 -- -- AA   02/02/25 0530 -- 127 95 % 30 210/77 -- -- AA   02/02/25 0515 -- 92 93 % 32 175/84 -- -- AA   02/02/25 0500 -- -- 97 % -- -- -- -- MS   02/02/25 0449 101.2 °F (38.4 °C) 96 98 % 26 141/88 -- -- AA            Physical Exam  Constitutional:       General: He is in acute distress.      Appearance: He is ill-appearing, toxic-appearing and diaphoretic.   HENT:      Head: Normocephalic and atraumatic.      Nose: Nose normal.      Mouth/Throat:      Mouth: Mucous membranes are dry.      Pharynx: Oropharynx is clear.   Eyes:      General: No scleral icterus.     Conjunctiva/sclera: Conjunctivae normal.      Comments: Pupils reactive light however left pupil ovoid shaped, with corneal haziness   Cardiovascular:      Rate and Rhythm: Normal rate and regular rhythm.      Pulses: Normal pulses.      Heart sounds: Normal heart sounds. No murmur heard.     No friction rub. No gallop.   Pulmonary:      Effort: Respiratory distress present.      Breath sounds: No stridor. No wheezing.      Comments: Coarse breath sounds in bilateral bases, good air movement  Chest:      Chest wall: No tenderness.   Abdominal:      General: Abdomen is flat. There is no distension.      Palpations:  Abdomen is soft. There is no mass.      Tenderness: There is no guarding or rebound.      Hernia: No hernia is present.   Musculoskeletal:      Right lower leg: No edema.      Left lower leg: No edema.   Skin:     General: Skin is warm.      Capillary Refill: Capillary refill takes less than 2 seconds.   Neurological:      GCS: GCS eye subscore is 2. GCS verbal subscore is 3. GCS motor subscore is 4.         Results Reviewed       Procedure Component Value Units Date/Time    Blood culture #1 [[id number]] Collected: 02/02/25 0458    Lab Status: Preliminary result Specimen: Blood from Arm, Left Updated: 02/02/25 1301     Blood Culture Received in Microbiology Lab. Culture in Progress.    Blood culture #2 [[id number]] Collected: 02/02/25 0456    Lab Status: Preliminary result Specimen: Blood from Arm, Right Updated: 02/02/25 1301     Blood Culture Received in Microbiology Lab. Culture in Progress.    Respiratory Panel 2.1(RP2)with COVID19 [[id number]]  (Normal) Collected: 02/02/25 0610    Lab Status: Final result Specimen: Nasopharyngeal Swab Updated: 02/02/25 1253     Adenovirus Not Detected     Bordetella parapertussis Not Detected     Bordetella pertussis Not Detected     Chlamydia pneumoniae Not Detected     SARS-CoV-2 Not Detected     Coronavirus 229E Not Detected     Coronavirus HKU1 Not Detected     Coronavirus NL63 Not Detected     Coronavirus OC43 Not Detected     Human Metapneumovirus Not Detected     Rhino/Enterovirus Not Detected     Influenza A Not Detected     Influenza B Not Detected     Mycoplasma pneumoniae Not Detected     Parainfluenza 1 Not Detected     Parainfluenza 2 Not Detected     Parainfluenza 3 Not Detected     Parainfluenza 4 Not Detected     Respiratory Syncytial Virus Not Detected    Blood gas, arterial [[id number]]  (Abnormal) Collected: 02/02/25 1143    Lab Status: Final result Specimen: Blood, Arterial from Radial, Left Updated: 02/02/25 1200     pH, Arterial 7.365     pCO2, Arterial 79.4  mm Hg      pO2, Arterial 69.3 mm Hg      HCO3, Arterial 44.4 mmol/L      Base Excess, Arterial 16.0 mmol/L      O2 Content, Arterial 13.6 mL/dL      O2 HGB,Arterial  92.7 %      SOURCE Radial, Left     AVINASH TEST Yes     Non Vent Type- HFNC HFNC Flow    HS Troponin I 2hr [[id number]]  (Normal) Collected: 02/02/25 0724    Lab Status: Final result Specimen: Blood from Arm, Right Updated: 02/02/25 0752     hs TnI 2hr 25 ng/L      Delta 2hr hsTnI 2 ng/L     Magnesium [[id number]]  (Normal) Collected: 02/02/25 0538    Lab Status: Final result Specimen: Blood from Arm, Left Updated: 02/02/25 0604     Magnesium 2.4 mg/dL     Phosphorus [[id number]]  (Normal) Collected: 02/02/25 0538    Lab Status: Final result Specimen: Blood from Arm, Left Updated: 02/02/25 0604     Phosphorus 2.4 mg/dL     FLU/RSV/COVID - if FLU/RSV clinically relevant [[id number]]  (Normal) Collected: 02/02/25 0456    Lab Status: Final result Specimen: Nares from Nose Updated: 02/02/25 0551     SARS-CoV-2 Negative     INFLUENZA A PCR Negative     INFLUENZA B PCR Negative     RSV PCR Negative    Narrative:      This test has been performed using the CoV-2/Flu/RSV plus assay on the [employer] GeneXpert platform. This test has been validated by the  and verified by the performing laboratory.     This test is designed to amplify and detect the following: nucleocapsid (N), envelope (E), and RNA-dependent RNA polymerase (RdRP) genes of the SARS-CoV-2 genome; matrix (M), basic polymerase (PB2), and acidic protein (PA) segments of the influenza A genome; matrix (M) and non-structural protein (NS) segments of the influenza B genome, and the nucleocapsid genes of RSV A and RSV B.     Positive results are indicative of the presence of Flu A, Flu B, RSV, and/or SARS-CoV-2 RNA. Positive results for SARS-CoV-2 or suspected novel influenza should be reported to state, local, or federal health departments according to local reporting requirements.      All results  should be assessed in conjunction with clinical presentation and other laboratory markers for clinical management.     FOR PEDIATRIC PATIENTS - copy/paste COVID Guidelines URL to browser: [email or web address]       Calcium, ionized [[id number]]  (Abnormal) Collected: 02/02/25 0538    Lab Status: Final result Specimen: Blood from Arm, Left Updated: 02/02/25 0549     Calcium, Ionized 1.11 mmol/L     B-Type Natriuretic Peptide(BNP) [[id number]]  (Normal) Collected: 02/02/25 0456    Lab Status: Final result Specimen: Blood from Arm, Right Updated: 02/02/25 0533     BNP 99 pg/mL     Procalcitonin [[id number]]  (Normal) Collected: 02/02/25 0456    Lab Status: Final result Specimen: Blood from Arm, Right Updated: 02/02/25 0532     Procalcitonin 0.11 ng/ml     HS Troponin 0hr (reflex protocol) [[id number]]  (Normal) Collected: 02/02/25 0456    Lab Status: Final result Specimen: Blood from Arm, Right Updated: 02/02/25 0530     hs TnI 0hr 23 ng/L     Comprehensive metabolic panel [[id number]]  (Abnormal) Collected: 02/02/25 0456    Lab Status: Final result Specimen: Blood from Arm, Right Updated: 02/02/25 0526     Sodium 146 mmol/L      Potassium 4.2 mmol/L      Chloride 93 mmol/L      CO2 >45 mmol/L      ANION GAP --     BUN 22 mg/dL      Creatinine 0.81 mg/dL      Glucose 102 mg/dL      Calcium 10.1 mg/dL      AST 23 U/L      ALT 13 U/L      Alkaline Phosphatase 54 U/L      Total Protein 7.6 g/dL      Albumin 4.1 g/dL      Total Bilirubin 0.68 mg/dL      eGFR 79 ml/min/1.73sq m     Narrative:      Anion Gap can nor be calculated due to >CO2  National Kidney Disease Foundation guidelines for Chronic Kidney Disease (CKD):     Stage 1 with normal or high GFR (GFR > 90 mL/min/1.73 square meters)    Stage 2 Mild CKD (GFR = 60-89 mL/min/1.73 square meters)    Stage 3A Moderate CKD (GFR = 45-59 mL/min/1.73 square meters)    Stage 3B Moderate CKD (GFR = 30-44 mL/min/1.73 square  meters)    Stage 4 Severe CKD (GFR = 15-29 mL/min/1.73 square meters)    Stage 5 End Stage CKD (GFR <15 mL/min/1.73 square meters)  Note: GFR calculation is accurate only with a steady state creatinine    Lactic acid [[id number]]  (Normal) Collected: 02/02/25 0456    Lab Status: Final result Specimen: Blood from Arm, Right Updated: 02/02/25 0523     LACTIC ACID 1.2 mmol/L     Narrative:      Result may be elevated if tourniquet was used during collection.    Protime-INR [[id number]]  (Normal) Collected: 02/02/25 0456    Lab Status: Final result Specimen: Blood from Arm, Right Updated: 02/02/25 0522     Protime 13.8 seconds      INR 1.04    Narrative:      INR Therapeutic Range    Indication                                             INR Range      Atrial Fibrillation                                               2.0-3.0  Hypercoagulable State                                    2.0.2.3  Left Ventricular Asist Device                            2.0-3.0  Mechanical Heart Valve                                  -    Aortic(with afib, MI, embolism, HF, LA enlargement,    and/or coagulopathy)                                     2.0-3.0 (2.5-3.5)     Mitral                                                             2.5-3.5  Prosthetic/Bioprosthetic Heart Valve               2.0-3.0  Venous thromboembolism (VTE: VT, PE        2.0-3.0    APTT [[id number]]  (Normal) Collected: 02/02/25 0456    Lab Status: Final result Specimen: Blood from Arm, Right Updated: 02/02/25 0522     PTT 26 seconds     CBC and differential [[id number]]  (Abnormal) Collected: 02/02/25 0456    Lab Status: Final result Specimen: Blood from Arm, Right Updated: 02/02/25 0517     WBC 8.13 Thousand/uL      RBC 3.52 Million/uL      Hemoglobin 11.2 g/dL      Hematocrit 39.6 %       fL      MCH 31.8 pg      MCHC 28.3 g/dL      RDW 13.1 %      MPV 9.5 fL      Platelets 91 Thousands/uL      nRBC 0 /100 WBCs      Segmented % 77 %      Immature Grans % 1 %       Lymphocytes % 7 %      Monocytes % 13 %      Eosinophils Relative 2 %      Basophils Relative 0 %      Absolute Neutrophils 6.31 Thousands/µL      Absolute Immature Grans 0.04 Thousand/uL      Absolute Lymphocytes 0.58 Thousands/µL      Absolute Monocytes 1.02 Thousand/µL      Eosinophils Absolute 0.15 Thousand/µL      Basophils Absolute 0.03 Thousands/µL     Blood gas, Venous [[id number]]  (Abnormal) Collected: 02/02/25 0456    Lab Status: Final result Specimen: Blood from Arm, Right Updated: 02/02/25 0516     pH, Nabeel 7.463     pCO2, Nabeel 65.2 mm Hg      pO2, Nabeel 51.3 mm Hg      HCO3, Nabeel 45.6 mmol/L      Base Excess, Nabeel 18.5 mmol/L      O2 Content, Nabeel 15.0 ml/dL      O2 HGB, VENOUS 85.4 %     UA w Reflex to Microscopic w Reflex to Culture [[id number]]     Lab Status: No result Specimen: Urine     Sputum culture and Gram stain [[id number]]     Lab Status: No result Specimen: Sputum             CT head without contrast   Final Interpretation by Shaka Elizabeth DO (02/02 0741)      No acute intracranial abnormality.  Chronic microangiopathic changes.      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               Workstation performed: [id number]         CT spine cervical without contrast   Final Interpretation by Shaka Elizabeth DO (02/02 0747)      No cervical spine fracture or traumatic malalignment.                  Workstation performed: [id number]         CT chest abdomen pelvis w contrast   Final Interpretation by Shaka Elizabeth DO (02/02 0829)      1. Patchy opacity posterolateral right upper lobe could represent atelectasis or pneumonia. Little change in chronic right lower lobe consolidation.      2. Background emphysema. Slowly enlarging 8 mm right middle lobe nodule. Possibility of neoplasm cannot be excluded. Based on current Fleischner Society 2017 Guidelines on incidental pulmonary nodule, follow-up non-contrast CT is recommended at 6 months    from the initial  examination and, if stable at that time, an additional follow-up is recommended for 18-24 months from the initial examination.      3. Thickening of the urinary bladder with hyperemia involving posterior lateral bladder diverticulum. Correlate with urinalysis regarding cystitis.      4. 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 No change in aneurysmal dilatation junction distal ascending aorta/arch.      5. Question thickening of the proximal esophagus. Consider esophagram.      6. Diffuse colonic diverticulosis without diverticulitis. Moderate to large amount of colonic stool suggesting constipation.      7. Cholelithiasis.      Additional incidental findings as above.      The study was marked in EPIC for follow-up.            Workstation performed: [id number]         XR chest portable   ED Interpretation by Puneet East MD (02/02 0619)   Right sided vascular congestion.  No pleural effusion or focal infiltrate      Final Interpretation by Kelsy Rivera MD (02/02 1105)      Mild pulmonary venous congestion.      Moderate opacity in the right lower lobe. See subsequent chest CT.      Emphysema.            Workstation performed: [id number]             Procedures    ED Medication and Procedure Management   Prior to Admission Medications   Prescriptions Last Dose Informant Patient Reported? Taking?   Budeson-Glycopyrrol-Formoterol 160-9-4.8 MCG/ACT AERO   No No   Sig: Inhale 2 puffs 2 (two) times a day Rinse mouth after use.   Senna Plus 8.6-50 MG per tablet   No No   Sig: TAKE 1 TABLET BY MOUTH DAILYTOME 1 TABLET BY MOUTH DAILY   acetaminophen (TYLENOL) 650 mg CR tablet   No No   Sig: TAKE 1 TABLET (650 MG TOTAL) BY MOUTH EVERY 8 (EIGHT) HOURS AS NEEDED FOR MILD PAINTOME 1 TABLET (650 MG TOTAL) BY MOUTH CADA 8 (EIGHT) HOURS AS NEEDED PARA MILD PAIN   albuterol (PROVENTIL HFA,VENTOLIN HFA) 90 mcg/act inhaler   No No   Sig:  INHALE 2 PUFFS EVERY 6 (SIX) HOURS AS NEEDED FOR WHEEZINGINHALE 2 PUFFS CADA 6 (SIX) HOURS AS NEEDED PARA WHEEZING   azithromycin (ZITHROMAX) 250 mg tablet   No No   Sig: Take 1 tablet (250 mg total) by mouth every 24 hours   brimonidine tartrate 0.2 % ophthalmic solution   Yes No   Sig: ADMINISTER 1 DROP INTO THE LEFT EYE 2 (TWO) TIMES A DAY.   budesonide (Pulmicort) 0.5 mg/2 mL nebulizer solution   No No   Sig: Take 2 mL (0.5 mg total) by nebulization 2 (two) times a day Rinse mouth after use.   ciprofloxacin (CILOXAN) 0.3 % ophthalmic solution   No No   Si DROPS LEFT EAR EVERY NIGHT AT BEDTIME FOR 10 DAYS4 DROPS LEFT EAR CADA ANTES DE DORMIR EN LA NOCHE PARA 10 DAYS   ergocalciferol (VITAMIN D2) 50,000 units   No No   Sig: TAKE ONE CAPSULE BY MOUTH ONCE A WEEKTOMAR NAVA CAPSULA POR VIA ORAL NAVA VEZ A LA SEMANA   ipratropium-albuterol (DUO-NEB) 0.5-2.5 mg/3 mL nebulizer solution   No No   Sig: Take 3 mL by nebulization 2 (two) times a day   ketoconazole (NIZORAL) 2 % shampoo   No No   Sig: Apply 1 Application topically 2 (two) times a week   losartan (COZAAR) 25 mg tablet   No No   Sig: Take 1 tablet (25 mg total) by mouth 2 (two) times a day   mometasone (ELOCON) 0.1 % lotion   No No   Sig: Apply topically daily   multivitamin (THERAGRAN) TABS   No No   Sig: Take 1 tablet by mouth daily   pantoprazole (PROTONIX) 40 mg tablet   No No   Sig: TAKE 1 TABLET (40 MG TOTAL) BY MOUTH DAILY   predniSONE 5 mg tablet   No No   Sig: Take 1 tablet (5 mg total) by mouth daily   torsemide (DEMADEX) 5 MG tablet   No No   Sig: Take 1 tablet (5 mg total) by mouth daily      Facility-Administered Medications: None     Current Discharge Medication List        CONTINUE these medications which have NOT CHANGED    Details   acetaminophen (TYLENOL) 650 mg CR tablet TAKE 1 TABLET (650 MG TOTAL) BY MOUTH EVERY 8 (EIGHT) HOURS AS NEEDED FOR MILD PAINTOME 1 TABLET (650 MG TOTAL) BY MOUTH CADA 8 (EIGHT) HOURS AS NEEDED PARA MILD  PAIN  Qty: 90 tablet, Refills: 2    Associated Diagnoses: Chronic bilateral low back pain without sciatica      albuterol (PROVENTIL HFA,VENTOLIN HFA) 90 mcg/act inhaler INHALE 2 PUFFS EVERY 6 (SIX) HOURS AS NEEDED FOR WHEEZINGINHALE 2 PUFFS CADA 6 (SIX) HOURS AS NEEDED PARA WHEEZING  Qty: 18 g, Refills: 5    Comments: Substitution to a formulary equivalent within the same pharmaceutical class is authorized.  Associated Diagnoses: Chronic respiratory failure with hypoxia and hypercapnia (HCC)      azithromycin (ZITHROMAX) 250 mg tablet Take 1 tablet (250 mg total) by mouth every 24 hours  Qty: 30 tablet, Refills: 11    Comments: This is not an error. Azithromycin daily to prevent reinfection.  Associated Diagnoses: Mixed simple and mucopurulent chronic bronchitis (HCC)      brimonidine tartrate 0.2 % ophthalmic solution ADMINISTER 1 DROP INTO THE LEFT EYE 2 (TWO) TIMES A DAY.      Budeson-Glycopyrrol-Formoterol 160-9-4.8 MCG/ACT AERO Inhale 2 puffs 2 (two) times a day Rinse mouth after use.  Qty: 10.7 g, Refills: 5    Associated Diagnoses: Mixed simple and mucopurulent chronic bronchitis (HCC)      budesonide (Pulmicort) 0.5 mg/2 mL nebulizer solution Take 2 mL (0.5 mg total) by nebulization 2 (two) times a day Rinse mouth after use.  Qty: 360 mL, Refills: 3    Associated Diagnoses: Chronic obstructive pulmonary disease, unspecified COPD type (HCC)      ciprofloxacin (CILOXAN) 0.3 % ophthalmic solution 4 DROPS LEFT EAR EVERY NIGHT AT BEDTIME FOR 10 DAYS4 DROPS LEFT EAR CADA ANTES DE DORMIR EN LA NOCHE PARA 10 DAYS  Qty: 5 mL, Refills: 2    Associated Diagnoses: Dysfunction of both eustachian tubes; Bilateral otitis media, unspecified otitis media type      ergocalciferol (VITAMIN D2) 50,000 units TAKE ONE CAPSULE BY MOUTH ONCE A WEEKTOMAR NAVA CAPSULA POR VIA ORAL NAVA VEZ A LA SEMANA  Qty: 12 capsule, Refills: 3    Associated Diagnoses: Vitamin D deficiency      ipratropium-albuterol (DUO-NEB) 0.5-2.5 mg/3 mL  nebulizer solution Take 3 mL by nebulization 2 (two) times a day  Qty: 540 mL, Refills: 3    Associated Diagnoses: Chronic obstructive pulmonary disease, unspecified COPD type (HCC)      ketoconazole (NIZORAL) 2 % shampoo Apply 1 Application topically 2 (two) times a week  Qty: 120 mL, Refills: 5    Associated Diagnoses: Seborrheic dermatitis      losartan (COZAAR) 25 mg tablet Take 1 tablet (25 mg total) by mouth 2 (two) times a day  Qty: 180 tablet, Refills: 1    Associated Diagnoses: Benign essential hypertension      mometasone (ELOCON) 0.1 % lotion Apply topically daily  Qty: 60 mL, Refills: 5    Associated Diagnoses: Seborrheic dermatitis      multivitamin (THERAGRAN) TABS Take 1 tablet by mouth daily  Qty: 30 tablet, Refills: 5    Associated Diagnoses: Failure to thrive in adult      pantoprazole (PROTONIX) 40 mg tablet TAKE 1 TABLET (40 MG TOTAL) BY MOUTH DAILY  Qty: 30 tablet, Refills: 5    Associated Diagnoses: Long term (current) use of systemic steroids      predniSONE 5 mg tablet Take 1 tablet (5 mg total) by mouth daily  Qty: 30 tablet, Refills: 5    Associated Diagnoses: Mixed simple and mucopurulent chronic bronchitis (HCC)      Senna Plus 8.6-50 MG per tablet TAKE 1 TABLET BY MOUTH DAILYTOME 1 TABLET BY MOUTH DAILY  Qty: 30 tablet, Refills: 5    Associated Diagnoses: Chronic idiopathic constipation      torsemide (DEMADEX) 5 MG tablet Take 1 tablet (5 mg total) by mouth daily  Qty: 30 tablet, Refills: 5    Associated Diagnoses: Acute on chronic congestive heart failure, unspecified heart failure type (HCC)           No discharge procedures on file.  ED SEPSIS DOCUMENTATION   Time reflects when diagnosis was documented in both MDM as applicable and the Disposition within this note       Time User Action Codes Description Comment    2/2/2025  9:56 AM Travis Alegria [R41.82] Altered mental status     2/2/2025  9:56 AM Travis Alegria [J18.9] Pneumonia     2/2/2025 11:12 AM Phan Lutz  [J96.11,  J96.12] Chronic respiratory failure with hypoxia and hypercapnia (HCC)     2/2/2025 11:20 AM Phan Lutz [J44.9] COPD, severe (HCC)                  Puneet East MD  02/02/25 2791

## 2025-02-02 NOTE — CONSULTS
Consultation - Pulmonology   Name: Severiano Feliciano 89 y.o. male I MRN: [id number]  Unit/Bed#: ED-15 I Date of Admission: 2/2/2025   Date of Service: 2/2/2025 I Hospital Day: 0   Inpatient consult to Pulmonology  Consult performed by: Gabi Christiansen DO  Consult ordered by: Phan Lutz MD      Inpatient consult to Pulmonology  Consult performed by: Gabi Christiansen DO  Consult ordered by: Phan Lutz MD        Physician Requesting Evaluation: Phan Lutz MD   Reason for Evaluation / Principal Problem: AECOPD    Assessment & Plan  Acute exacerbation of chronic obstructive pulmonary disease (COPD) (HCC)  - very severe COPD, FEV1 0.54L 25% predicted in 2024  - IV steroids  - nebulized bronchodilators    Overall poor pulmonary prognosis.  Hospice would be appropriate, as would DNR/DNI.  Pulmonary hypertension (HCC)  - like WHO group III  - monitor volume status  Acute on chronic respiratory failure with hypoxia and hypercapnia (HCC)  -3L supplemental oxygen with exertion  - titrate off oxygen as able  Chronic diastolic (congestive) heart failure (HCC)  Wt Readings from Last 3 Encounters:   02/02/25 60.3 kg (132 lb 15 oz)   11/05/24 60.1 kg (132 lb 9.6 oz)   09/20/24 59.3 kg (130 lb 12.8 oz)     - weight stable, diurese per primary team  JAY treated with BiPAP  - not compliant with BIPAP  Abnormal CT of the chest  - new RUL opacity on imaging; no leukocytosis, procalcitonin negative.  - cont. Ceftriaxone x 24 hours then re-evaluate.    Solitary pulmonary nodule on lung CT  8 x 5 mm right middle lobe nodule, most recently 5 x 3 mm (3/163) and about 4 x 2 mm in January 2024.   - outpt follow up. Given age and severe co-morbidities, this likely does not need to be followed.    Discussed with pt and family. Concerns addressed.      History of Present Illness   Severiano Feliciano is a 89 y.o. male past medical history of very severe COPD, chronic hypoxic and hypercapnic respiratory failure, HFpEF,  "hypertension, hyperlipidemia, hearing loss, who presented to the ER today with hypoxia and encephalopathy. Found to have possible pneumonia with known very severe COPD. Placed on BIPAP for hypoxia. Admitted to [facility] and we are asked to help with pulmonary management.    Found lying in ER stretcher. Spouse and daughter at bedside. Pt asleep,easily arousable, asking if his lungs sound Ok.  Son, on phone, reports pt has been doing \"good\" for 6 months. Using foot bike. Then multiple people in the family got sick and pt developed sick symptoms.    Records reviewed. Pt known to our practice, follows with Dr. Hayward. Gurpreet GUILLEN COPD with multiple exacerbations in past. On all nebulized regimen at home. Not on chronic Azithro with chronic hearing loss. Has been referred to palliative care in past.  On 3L supplemental oxygen with exertion.      Review of Systems  Per son, as above    Historical Information   I have reviewed the patient's PMH, PSH, Social History, Family History, Meds, and Allergies  Tobacco History:  pack/day for 35.0 years (35.0 ttl pk-yrs)  Types: Cigarettes  Start date: 1966  Quit date: 2001   Occupational History: retired   Family History:non-contributory    Objective :  Temp:  [101.2 °F (38.4 °C)] 101.2 °F (38.4 °C)  HR:  [] 76  BP: ()/(44-88) 122/59  Resp:  [14-43] 24  SpO2:  [90 %-98 %] 90 %  O2 Device: High flow nasal cannula    35L 50% saturation 92%    Physical Exam  GEN: no distress, speaking without accessory muscles; appears chronically ill  HEENT: NCAT, EOMI  CVS: regular  LUNGS: clear but diminished, not wheezing  ABD: soft  NEURO: No focal deficits        Lab Results: I have reviewed the following results:  .     02/02/25  0456 02/02/25  0538 02/02/25  0724   WBC 8.13  --   --    HGB 11.2*  --   --    HCT 39.6  --   --    PLT 91*  --   --    SODIUM 146  --   --    K 4.2  --   --    CL 93*  --   --    CO2 >45*  --   --    BUN 22  --   --    CREATININE 0.81  --   --    GLUC 102  " --   --    CAIONIZED  --  1.11*  --    MG  --  2.4  --    PHOS  --  2.4  --    AST 23  --   --    ALT 13  --   --    ALB 4.1  --   --    TBILI 0.68  --   --    ALKPHOS 54  --   --    PTT 26  --   --    INR 1.04  --   --    HSTNI0 23  --   --    HSTNI2  --   --  25   BNP 99  --   --    LACTICACID 1.2  --   --      ABG:   .     02/02/25  1143   PHART 7.365   XDI3CEH 79.4*   PO2ART 69.3*   LLG9EYS 44.4*   BEART 16.0     Labs per my review reveal normal renal function, metabolic alkalosis, stable anemia    RP2 negative    ABG shows chronic compensated hypercapnia and hypoxia    Imaging Results Review: I personally reviewed the following image studies in PACS and associated radiology reports: CT chest. My interpretation of the radiology images/reports is: CT chest per my review shows mild to moderate emphysema, right lower lobe consolidation unchanged, right upper lobe opacity, 8 x 5 mm right middle lobe nodule, mucus in right mainstem bronchus.    PFT Results Reviewed: reviewed and interpreted  PFTs 3/6/2024 per my review shows very severe obstruction FEV1 0.54 L, 25% predicted    VTE Prophylaxis: VTE covered by:  enoxaparin, Subcutaneous, 40 mg at 02/02/25 8132

## 2025-02-02 NOTE — ASSESSMENT & PLAN NOTE
- very severe COPD, FEV1 0.54L 25% predicted in 2024  - IV steroids  - nebulized bronchodilators    Overall poor pulmonary prognosis.  Hospice would be appropriate, as would DNR/DNI.

## 2025-02-02 NOTE — ASSESSMENT & PLAN NOTE
- new RUL opacity on imaging; no leukocytosis, procalcitonin negative.  - cont. Ceftriaxone x 24 hours then re-evaluate.

## 2025-02-03 PROBLEM — Z51.5 PALLIATIVE CARE ENCOUNTER: Status: ACTIVE | Noted: 2025-02-03

## 2025-02-03 LAB
ATRIAL RATE: 100 BPM
ATRIAL RATE: 91 BPM
BASOPHILS # BLD AUTO: 0.01 THOUSANDS/ΜL (ref 0–0.1)
BASOPHILS NFR BLD AUTO: 0 % (ref 0–1)
BUN SERPL-MCNC: 26 MG/DL (ref 5–25)
CALCIUM SERPL-MCNC: 9.4 MG/DL (ref 8.4–10.2)
CHLORIDE SERPL-SCNC: 97 MMOL/L (ref 96–108)
CO2 SERPL-SCNC: >45 MMOL/L (ref 21–32)
CREAT SERPL-MCNC: 0.85 MG/DL (ref 0.6–1.3)
EOSINOPHIL # BLD AUTO: 0 THOUSAND/ΜL (ref 0–0.61)
EOSINOPHIL NFR BLD AUTO: 0 % (ref 0–6)
ERYTHROCYTE [DISTWIDTH] IN BLOOD BY AUTOMATED COUNT: 12.9 % (ref 11.6–15.1)
GFR SERPL CREATININE-BSD FRML MDRD: 78 ML/MIN/1.73SQ M
GLUCOSE SERPL-MCNC: 136 MG/DL (ref 65–140)
GLUCOSE SERPL-MCNC: 90 MG/DL (ref 65–140)
HCT VFR BLD AUTO: 35.7 % (ref 36.5–49.3)
HGB BLD-MCNC: 10.3 G/DL (ref 12–17)
IMM GRANULOCYTES # BLD AUTO: 0.03 THOUSAND/UL (ref 0–0.2)
IMM GRANULOCYTES NFR BLD AUTO: 1 % (ref 0–2)
LYMPHOCYTES # BLD AUTO: 0.55 THOUSANDS/ΜL (ref 0.6–4.47)
LYMPHOCYTES NFR BLD AUTO: 9 % (ref 14–44)
MCH RBC QN AUTO: 31.7 PG (ref 26.8–34.3)
MCHC RBC AUTO-ENTMCNC: 28.9 G/DL (ref 31.4–37.4)
MCV RBC AUTO: 110 FL (ref 82–98)
MONOCYTES # BLD AUTO: 0.61 THOUSAND/ΜL (ref 0.17–1.22)
MONOCYTES NFR BLD AUTO: 9 % (ref 4–12)
NEUTROPHILS # BLD AUTO: 5.27 THOUSANDS/ΜL (ref 1.85–7.62)
NEUTS SEG NFR BLD AUTO: 81 % (ref 43–75)
NRBC BLD AUTO-RTO: 0 /100 WBCS
P AXIS: 65 DEGREES
P AXIS: 65 DEGREES
PLATELET # BLD AUTO: 86 THOUSANDS/UL (ref 149–390)
PMV BLD AUTO: 9.5 FL (ref 8.9–12.7)
POTASSIUM SERPL-SCNC: 4.4 MMOL/L (ref 3.5–5.3)
PR INTERVAL: 156 MS
PR INTERVAL: 156 MS
PROCALCITONIN SERPL-MCNC: 0.12 NG/ML
QRS AXIS: 76 DEGREES
QRS AXIS: 82 DEGREES
QRSD INTERVAL: 84 MS
QRSD INTERVAL: 84 MS
QT INTERVAL: 342 MS
QT INTERVAL: 350 MS
QTC INTERVAL: 430 MS
QTC INTERVAL: 441 MS
RBC # BLD AUTO: 3.25 MILLION/UL (ref 3.88–5.62)
SODIUM SERPL-SCNC: 146 MMOL/L (ref 135–147)
T WAVE AXIS: 70 DEGREES
T WAVE AXIS: 82 DEGREES
VENTRICULAR RATE: 100 BPM
VENTRICULAR RATE: 91 BPM
WBC # BLD AUTO: 6.47 THOUSAND/UL (ref 4.31–10.16)

## 2025-02-03 PROCEDURE — 80048 BASIC METABOLIC PNL TOTAL CA: CPT | Performed by: INTERNAL MEDICINE

## 2025-02-03 PROCEDURE — 85025 COMPLETE CBC W/AUTO DIFF WBC: CPT | Performed by: INTERNAL MEDICINE

## 2025-02-03 PROCEDURE — 99223 1ST HOSP IP/OBS HIGH 75: CPT | Performed by: NURSE PRACTITIONER

## 2025-02-03 PROCEDURE — 99232 SBSQ HOSP IP/OBS MODERATE 35: CPT

## 2025-02-03 PROCEDURE — 84145 PROCALCITONIN (PCT): CPT

## 2025-02-03 PROCEDURE — 82948 REAGENT STRIP/BLOOD GLUCOSE: CPT

## 2025-02-03 PROCEDURE — 94762 N-INVAS EAR/PLS OXIMTRY CONT: CPT

## 2025-02-03 PROCEDURE — 94640 AIRWAY INHALATION TREATMENT: CPT

## 2025-02-03 PROCEDURE — 94760 N-INVAS EAR/PLS OXIMETRY 1: CPT

## 2025-02-03 PROCEDURE — 99232 SBSQ HOSP IP/OBS MODERATE 35: CPT | Performed by: INTERNAL MEDICINE

## 2025-02-03 PROCEDURE — 93010 ELECTROCARDIOGRAM REPORT: CPT | Performed by: INTERNAL MEDICINE

## 2025-02-03 RX ORDER — LOSARTAN POTASSIUM 25 MG/1
25 TABLET ORAL 2 TIMES DAILY
Status: DISCONTINUED | OUTPATIENT
Start: 2025-02-03 | End: 2025-02-07

## 2025-02-03 RX ADMIN — METHYLPREDNISOLONE SODIUM SUCCINATE 40 MG: 40 INJECTION, POWDER, FOR SOLUTION INTRAMUSCULAR; INTRAVENOUS at 04:56

## 2025-02-03 RX ADMIN — LEVALBUTEROL HYDROCHLORIDE 1.25 MG: 1.25 SOLUTION RESPIRATORY (INHALATION) at 19:40

## 2025-02-03 RX ADMIN — LEVALBUTEROL HYDROCHLORIDE 1.25 MG: 1.25 SOLUTION RESPIRATORY (INHALATION) at 08:13

## 2025-02-03 RX ADMIN — LEVALBUTEROL HYDROCHLORIDE 1.25 MG: 1.25 SOLUTION RESPIRATORY (INHALATION) at 13:58

## 2025-02-03 RX ADMIN — IPRATROPIUM BROMIDE 0.5 MG: 0.5 SOLUTION RESPIRATORY (INHALATION) at 08:13

## 2025-02-03 RX ADMIN — METHYLPREDNISOLONE SODIUM SUCCINATE 40 MG: 40 INJECTION, POWDER, FOR SOLUTION INTRAMUSCULAR; INTRAVENOUS at 21:31

## 2025-02-03 RX ADMIN — METHYLPREDNISOLONE SODIUM SUCCINATE 40 MG: 40 INJECTION, POWDER, FOR SOLUTION INTRAMUSCULAR; INTRAVENOUS at 13:46

## 2025-02-03 RX ADMIN — LOSARTAN POTASSIUM 25 MG: 25 TABLET, FILM COATED ORAL at 10:54

## 2025-02-03 RX ADMIN — CEFTRIAXONE 1000 MG: 10 INJECTION, POWDER, FOR SOLUTION INTRAVENOUS at 18:08

## 2025-02-03 RX ADMIN — AZITHROMYCIN MONOHYDRATE 500 MG: 500 INJECTION, POWDER, LYOPHILIZED, FOR SOLUTION INTRAVENOUS at 15:32

## 2025-02-03 RX ADMIN — MELATONIN 3 MG: 3 TAB ORAL at 22:48

## 2025-02-03 RX ADMIN — IPRATROPIUM BROMIDE 0.5 MG: 0.5 SOLUTION RESPIRATORY (INHALATION) at 19:40

## 2025-02-03 RX ADMIN — LOSARTAN POTASSIUM 25 MG: 25 TABLET, FILM COATED ORAL at 21:31

## 2025-02-03 RX ADMIN — IPRATROPIUM BROMIDE 0.5 MG: 0.5 SOLUTION RESPIRATORY (INHALATION) at 13:58

## 2025-02-03 RX ADMIN — ENOXAPARIN SODIUM 40 MG: 40 INJECTION SUBCUTANEOUS at 08:27

## 2025-02-03 RX ADMIN — SODIUM CHLORIDE 125 MG: 9 INJECTION, SOLUTION INTRAVENOUS at 03:48

## 2025-02-03 RX ADMIN — DIVALPROEX SODIUM 125 MG: 125 CAPSULE ORAL at 22:48

## 2025-02-03 NOTE — ASSESSMENT & PLAN NOTE
-8 x 5 mm right middle lobe nodule, most recently 5 x 3 mm (3/163) and about 4 x 2 mm in January 2024.   -Outpt follow up. Given age and severe co-morbidities, this likely does not need to be followed.

## 2025-02-03 NOTE — ASSESSMENT & PLAN NOTE
Palliative diagnoses: COPD, CHF  Outpatient Palliative provider: Known to palliative medicine through previous admissions.  No outpatient follow-up has been established    Goals of care  Level 1 code status  Disease focused care 1.   Concerns introduced today include:  Introduced Palliative Medicine  Discussed CODE STATUS  Scheduled a family meeting with son Daljit and patient's spouse Viridiana for tomorrow at 1:00.  Daljit knows we will be discussing goals of care and CODE STATUS at meeting.  Will continue discussions regarding GOC as patient's clinical presentation evolves.    Social support provided for send Daljit:  3 adult children support patient  Daljit reports they have never discussed CODE STATUS for patient  Reports his mother is in good health and takes care of patient with the assistance of children  Supportive listening provided  Normalized experience of patient/family  Provided anxiety containment  Provided anticipatory guidance  Living situation -patient lives with his wife and 2 of his children, son Renny and daughter Rocio    Follow up  Palliative Care will continue to follow and goals of care discussions will be ongoing.  We will meet with family tomorrow at 1:00.  Please reach out via TapTap Secure Chat if questions or concerns arise.    I have reviewed the patient's controlled substance dispensing history in the Prescription Drug Monitoring Program in compliance with the [place] regulations before prescribing any controlled substances.  Last refills  No opioid or benzo refills in [place] over past year.    Decisional apparatus:  Patient lacks capacity based on chart review.  Patient's substitute decision maker would default to spouse Viridiana Prathergo by [place] Act 169.  Will need to investigate family dynamics at family meeting tomorrow as patient's spouse has a different last name than patient and children  Advance Directive/Living Will, POLST and POA Forms: None on file  ER contacts:  Name Relation Home Work Mobile    Viridiana Zambrano Spouse [phone]   Severiano,Julio Son   [phone]   Jim Patino Grandparent   [phone]   Renny Fernandez Son [phone]   Rocio Fernandez Daughter   [phone]     We appreciate the invitation to be involved in this patient's care.  We will continue to follow throughout this hospitalization.  Please do not hesitate to reach our on call provider through our clinic answering service at [phone] should you have acute symptom control concerns.    Jennifer P. Bloch, MSN, CRNP, [facility]  Palliative and Supportive Care  Clinic/Answering Service: [phone]  You can find me on Epic Secure Chat

## 2025-02-03 NOTE — PROGRESS NOTES
Progress Note - Pulmonology   Name: Severiano Feliciano 89 y.o. male I MRN: [id number]  Unit/Bed#: E4 -01 I Date of Admission: 2/2/2025   Date of Service: 2/3/2025 I Hospital Day: 1    Assessment & Plan  Acute exacerbation of chronic obstructive pulmonary disease (COPD) (HCC)  -Very severe COPD, FEV1 0.54L 25% predicted in 2024  -Baseline regimen includes all nebulized therapy. Not on chronic azithromycin due to hearing loss  -Presenting with acute exacerbation  -Continue IV steroids  -Start azithromycin x3 days  -Continue nebulized bronchodilators  -Overall poor pulmonary prognosis. Hospice would be appropriate, as would DNR/DNI.  Acute on chronic respiratory failure with hypoxia and hypercapnia (HCC)  -At baseline, on 3L supplemental oxygen with exertion  -Currently on HFNC 35L, 50% FiO2  -Continue to wean supplemental oxygen to maintain SpO2 > 88%  -Encourage OOB as tolerated, incentive spirometry  -Ambulatory O2 evaluation prior to discharge  Pulmonary hypertension (HCC)  -Likely WHO group III  -Continue to monitor volume status  Chronic diastolic (congestive) heart failure (HCC)  -Weight stable  -Continue diuresis per primary team  JAY treated with BiPAP  -Not compliant with BIPAP  Abnormal CT of the chest  -New RUL opacity on imaging; no leukocytosis, procalcitonin negative.  -Repeat procal negative  -Consider stopping ceftriaxone  Solitary pulmonary nodule on lung CT  -8 x 5 mm right middle lobe nodule, most recently 5 x 3 mm (3/163) and about 4 x 2 mm in January 2024.   -Outpt follow up. Given age and severe co-morbidities, this likely does not need to be followed.  Palliative care encounter  -Palliative care consulted - plan for meeting tomorrow 2/4    24 Hour Events : No acute events.   Subjective : Patient examined at bedside this morning. He was reportedly placed on BiPAP overnight but did not tolerate it well so was then placed on supplemental oxygen. This was increased up to 10L MFNC, O2  "saturation remained in the mid to low 80s. RT called and placed patient on HFNC 35L, 50% FiO2. Patient appears confused and agitated. Patients son was called to help patient calm down and tell him that he needs to wear the oxygen.     Severiano Feliciano is a 89 y.o. male past medical history of very severe COPD, chronic hypoxic and hypercapnic respiratory failure, HFpEF, hypertension, hyperlipidemia, hearing loss, who presented to the ER today with hypoxia and encephalopathy. Found to have possible pneumonia with known very severe COPD. Placed on BIPAP for hypoxia. Admitted to [facility] and we are asked to help with pulmonary management.  Son reports pt has been doing \"good\" for 6 months. Using foot bike. Then multiple people in the family got sick and pt developed sick symptoms.  Patient is known to our practice, follows with Dr. Hayward. Gurpreet GUILLEN COPD with multiple exacerbations in past. On all nebulized regimen at home. Not on chronic Azithro with chronic hearing loss. Has been referred to palliative care in past. On 3L supplemental oxygen with exertion.    Objective :  Temp:  [98.8 °F (37.1 °C)-100.6 °F (38.1 °C)] 99.3 °F (37.4 °C)  HR:  [] 85  BP: ()/(47-98) 185/90  Resp:  [20-28] 20  SpO2:  [90 %-100 %] 100 %  O2 Device: Nasal cannula  Nasal Cannula O2 Flow Rate (L/min):  [4 L/min-5 L/min] 4 L/min  FiO2 (%):  [50] 50    Physical Exam  Vitals reviewed.   Constitutional:       General: He is not in acute distress.     Appearance: He is ill-appearing.   Eyes:      Pupils: Pupils are equal, round, and reactive to light.   Cardiovascular:      Heart sounds: Normal heart sounds.   Pulmonary:      Effort: Pulmonary effort is normal.      Breath sounds: No wheezing or rhonchi.      Comments: Clear but diminished  Skin:     General: Skin is warm and dry.      Capillary Refill: Capillary refill takes less than 2 seconds.   Psychiatric:         Behavior: Behavior is agitated.           Lab Results: I have reviewed " the following results:   .     02/03/25  0501   WBC 6.47   HGB 10.3*   HCT 35.7*   PLT 86*   SODIUM 146   K 4.4   CL 97   CO2 >45*   BUN 26*   CREATININE 0.85   GLUC 90     ABG:   .     02/02/25  1143   PHART 7.365   GRA1PCK 79.4*   PO2ART 69.3*   JCU6OXX 44.4*   BEART 16.0     Labs per my review reveal normal renal function, metabolic alkalosis, stable anemia     RP2 negative     ABG shows chronic compensated hypercapnia and hypoxia     Imaging Results Review: I personally reviewed the following image studies in PACS and associated radiology reports: CT chest. My interpretation of the radiology images/reports is: CT chest per my review shows mild to moderate emphysema, right lower lobe consolidation unchanged, right upper lobe opacity, 8 x 5 mm right middle lobe nodule, mucus in right mainstem bronchus.     PFT Results Reviewed: reviewed and interpreted  PFTs 3/6/2024 per my review shows very severe obstruction FEV1 0.54 L, 25% predicted    Traci Grya MD  Pulmonary & Critical Care Medicine Fellow PGY-4  [facility]

## 2025-02-03 NOTE — ASSESSMENT & PLAN NOTE
New right upper lobe opacity on imaging no leukocytosis procalcitonin negative  Pulmonology on board

## 2025-02-03 NOTE — PLAN OF CARE
Problem: Potential for Falls  Goal: Patient will remain free of falls  Description: INTERVENTIONS:  - Educate patient/family on patient safety including physical limitations  - Instruct patient to call for assistance with activity   - Consult OT/PT to assist with strengthening/mobility   - Keep Call bell within reach  - Keep bed low and locked with side rails adjusted as appropriate  - Keep care items and personal belongings within reach  - Initiate and maintain comfort rounds  - Make Fall Risk Sign visible to staff  - Offer Toileting every  Hours, in advance of need  - Initiate/Maintain alarm  - Obtain necessary fall risk management equipment  - Apply yellow socks and bracelet for high fall risk patients  - Consider moving patient to room near nurses station  Outcome: Progressing     Problem: PAIN - ADULT  Goal: Verbalizes/displays adequate comfort level or baseline comfort level  Description: Interventions:  - Encourage patient to monitor pain and request assistance  - Assess pain using appropriate pain scale  - Administer analgesics based on type and severity of pain and evaluate response  - Implement non-pharmacological measures as appropriate and evaluate response  - Consider cultural and social influences on pain and pain management  - Notify physician/advanced practitioner if interventions unsuccessful or patient reports new pain  Outcome: Progressing     Problem: DISCHARGE PLANNING  Goal: Discharge to home or other facility with appropriate resources  Description: INTERVENTIONS:  - Identify barriers to discharge w/patient and caregiver  - Arrange for needed discharge resources and transportation as appropriate  - Identify discharge learning needs (meds, wound care, etc.)  - Arrange for interpretive services to assist at discharge as needed  - Refer to Case Management Department for coordinating discharge planning if the patient needs post-hospital services based on physician/advanced practitioner order or  complex needs related to functional status, cognitive ability, or social support system  Outcome: Progressing     Problem: Knowledge Deficit  Goal: Patient/family/caregiver demonstrates understanding of disease process, treatment plan, medications, and discharge instructions  Description: Complete learning assessment and assess knowledge base.  Interventions:  - Provide teaching at level of understanding  - Provide teaching via preferred learning methods  Outcome: Progressing     Problem: RESPIRATORY - ADULT  Goal: Achieves optimal ventilation and oxygenation  Description: INTERVENTIONS:  - Assess for changes in respiratory status  - Assess for changes in mentation and behavior  - Position to facilitate oxygenation and minimize respiratory effort  - Oxygen administered by appropriate delivery if ordered  - Initiate smoking cessation education as indicated  - Encourage broncho-pulmonary hygiene including cough, deep breathe, Incentive Spirometry  - Assess the need for suctioning and aspirate as needed  - Assess and instruct to report SOB or any respiratory difficulty  - Respiratory Therapy support as indicated  Outcome: Progressing     Problem: Prexisting or High Potential for Compromised Skin Integrity  Goal: Skin integrity is maintained or improved  Description: INTERVENTIONS:  - Identify patients at risk for skin breakdown  - Assess and monitor skin integrity  - Assess and monitor nutrition and hydration status  - Monitor labs   - Assess for incontinence   - Turn and reposition patient  - Assist with mobility/ambulation  - Relieve pressure over bony prominences  - Avoid friction and shearing  - Provide appropriate hygiene as needed including keeping skin clean and dry  - Evaluate need for skin moisturizer/barrier cream  - Collaborate with interdisciplinary team   - Patient/family teaching  - Consider wound care consult   Outcome: Progressing

## 2025-02-03 NOTE — ASSESSMENT & PLAN NOTE
-New RUL opacity on imaging; no leukocytosis, procalcitonin negative.  -Repeat procal negative  -Consider stopping ceftriaxone

## 2025-02-03 NOTE — UTILIZATION REVIEW
Initial Clinical Review    Admission: Date/Time/Statement:   Admission Orders (From admission, onward)       Ordered        02/02/25 0956  INPATIENT ADMISSION  Once                          Orders Placed This Encounter   Procedures    INPATIENT ADMISSION     Standing Status:   Standing     Number of Occurrences:   1     Level of Care:   Level 2 Stepdown / HOT [14]     Estimated length of stay:   More than 2 Midnights     Certification:   I certify that inpatient services are medically necessary for this patient for a duration of greater than two midnights. See H&P and MD Progress Notes for additional information about the patient's course of treatment.     ED Arrival Information       Expected   -    Arrival   2/2/2025 04:46    Acuity   Emergent              Means of arrival   Ambulance    Escorted by   [facility]    Service   Hospitalist    Admission type   Emergency              Arrival complaint   -             Chief Complaint   Patient presents with    Altered Mental Status     Pt BIB EMS. Pt had fevers today and received tylenol. Before calling 911 the patient became unresponsive to verbal stimuli and having trouble breathing. Room air sats initially 75%.       Initial Presentation: 89 y.o. male presents to the ED via EMS from home with c/o worsening coughing, SOB, significant hypoxia and obtundation.  Is on home oxygen and BIPAP.  PMH: severe COPD, chronic hypoxic and hypercapnic respiratory failure, HTN, DM, CHF, CAD, Vit D and B12 deficiencies. In the ED he was febrile and tachypnic placed on NRB mask and then BIPAP.  Treated with IV fluids, nebs, IV Mag, IV Tylenol, IV antibiotics, Xopenex, IV SoluMedrol, sq Lovenox, Depakote sprinkles and IV Depacon.  Labs - low platelets, elevated CO2.    ECG - SR w/ PSVT.  Imaging - RUL Pneumonia, emphysema, slowly enlarging 8 mm RML nodule - poss neoplasm, cystitis, ? Thickening prox esophagus, consider esophagram, diff diverticulosis w/o diverticulitis,  mod to large amt stool, cholelithiasis.  On exam obtunded, SOB, markedly diminished breath sounds bilat, lethargic but arousable, CELIA x 4.  Admitted to   INPATIENT status to [facility] 2 SD with Acute on chronic hypoxic and hypercarbic respiratory failure, exac COPD, chronic dHF - PLAN: High flow oxygen, IV steroids, bronchodilator therapy, IV antibiotics, pulmonary consult, poor prognosis, family notes pt would want intubation. POST ADMIT NOTE - pt became restless, anxious, delirium, delirium prec, continual observation if family leaves, consult Palliative Care for goals of care discussion.      Anticipated Length of Stay/Certification Statement:  patient will remain in the hospital 2 or more midnights.     2/2 Pulmonary Consult - acute exac COPD, pulm HTN, Acute on chronic resp failure, chronic dHF, solitary lung nodule - poor pulm prognosis, hospice appropriate - continue IV steroids, nebs broncodilators, monitor volume status, titrate off oxygen if able, diuresis per medicine, IV antibiotics.     Date: 2/3   Day 2:  Acute on chronic hypoxic and hypercarbic respiratory failure, very severe exac COPD, chronic dHF - remains on HFNC @ 35L, continue IV steroids, IV antibiotics and starting IV Azithromycin x 3 days, nebs, ambulatory oxygen study at d/c, continue diuresis, planned family meeting on 2/1.  Was placed on BIPAP overnight, he is confused and agitated.      2/3 Palliative Care Consult - family is disease focused, will have family meeting on 2/4.  They  have not discussed code status in past. On chart review pt lacks decisional capacity.     Date: 2/4  Day 3: Has surpassed a 2nd midnight with active treatments and services.  Acute on chronic hypoxic and hypercarbic respiratory failure, very severe exac COPD, chronic dHF - remains on HFNC, adjusting steroids down today to 40 mg daily.  Continue antibiotics. Was on restraints overnight to keep oxygen on.  On exam ill-appearing, clear but dimished breath sounds.   Scheduled family meeting for 2/5 @ 1 PM for goals of care dicussion.        ED Treatment-Medication Administration from 02/02/2025 0446 to 02/02/2025 2059         Date/Time Order Dose Route Action     02/02/2025 0503 multi-electrolyte (ISOLYTE-S PH 7.4) bolus 1,000 mL 1,000 mL Intravenous New Bag     02/02/2025 0543 multi-electrolyte (ISOLYTE-S PH 7.4) bolus 1,000 mL 1,000 mL Intravenous New Bag     02/02/2025 0500 ipratropium (ATROVENT) 0.02 % inhalation solution 1 mg 1 mg Nebulization Given     02/02/2025 0500 sodium chloride 0.9 % inhalation solution 12 mL 12 mL Nebulization Given     02/02/2025 0500 albuterol (2.5 mg/3 mL) 0.083 % inhalation solution **ADS Override Pull** --  Given     02/02/2025 0529 magnesium sulfate 4 g/100 mL IVPB (premix) 4 g 4 g Intravenous New Bag     02/02/2025 0553 acetaminophen (Ofirmev) injection 1,000 mg 1,000 mg Intravenous New Bag     02/02/2025 0624 cefepime (MAXIPIME) 2 g/50 mL dextrose IVPB 2,000 mg Intravenous New Bag     02/02/2025 0706 iohexol (OMNIPAQUE) 350 MG/ML injection (MULTI-DOSE) 100 mL 100 mL Intravenous Given     02/02/2025 1832 ceftriaxone (ROCEPHIN) 1 g/50 mL in dextrose IVPB 1,000 mg Intravenous New Bag     02/02/2025 1149 sodium chloride 0.9 % infusion 50 mL/hr Intravenous New Bag     02/02/2025 1404 levalbuterol (XOPENEX) inhalation solution 1.25 mg 1.25 mg Nebulization Given     02/02/2025 2014 levalbuterol (XOPENEX) inhalation solution 1.25 mg 1.25 mg Nebulization Given     02/02/2025 1404 sodium chloride 0.9 % inhalation solution 3 mL 3 mL Nebulization Given     02/02/2025 2014 sodium chloride 0.9 % inhalation solution 3 mL 3 mL Nebulization Given     02/02/2025 1155 methylPREDNISolone sodium succinate (Solu-MEDROL) injection 40 mg 40 mg Intravenous Given     02/02/2025 1937 methylPREDNISolone sodium succinate (Solu-MEDROL) injection 40 mg 40 mg Intravenous Given     02/02/2025 1159 enoxaparin (LOVENOX) subcutaneous injection 40 mg 40 mg Subcutaneous Given      02/02/2025 1404 ipratropium (ATROVENT) 0.02 % inhalation solution 0.5 mg 0.5 mg Nebulization Given     02/02/2025 2014 ipratropium (ATROVENT) 0.02 % inhalation solution 0.5 mg 0.5 mg Nebulization Given     02/02/2025 2021 divalproex sodium (DEPAKOTE SPRINKLE) capsule 125 mg -- Oral See Alternative     02/02/2025 2021 valproate (DEPACON) 125 mg in sodium chloride 0.9 % 50 mL IVPB 125 mg Intravenous New Bag            Scheduled Medications:  azithromycin, 500 mg, Intravenous, Q24H  cefTRIAXone, 1,000 mg, Intravenous, Q24H  enoxaparin, 40 mg, Subcutaneous, Daily  ipratropium, 0.5 mg, Nebulization, TID  levalbuterol, 1.25 mg, Nebulization, TID  losartan, 25 mg, Oral, BID  [START ON 2/5/2025] methylPREDNISolone sodium succinate, 40 mg, Intravenous, Daily      Continuous IV Infusions:    IV NSS @ 50 cc/hr - d/c 2/3     PRN Meds:  acetaminophen, 975 mg, Oral, Q8H PRN  aluminum-magnesium hydroxide-simethicone, 30 mL, Oral, Q4H PRN  divalproex sodium, 125 mg, Oral, HS PRN - x 1 2/3   Or  valproate sodium, 125 mg, Intravenous, HS PRN - x 1 2/2  guaiFENesin, 200 mg, Oral, Q4H PRN  melatonin, 3 mg, Oral, HS PRN - x 1 2/3  polyethylene glycol, 17 g, Oral, Daily PRN      ED Triage Vitals   Temperature Pulse Respirations Blood Pressure SpO2 Pain Score   02/02/25 0449 02/02/25 0449 02/02/25 0449 02/02/25 0449 02/02/25 0449 02/02/25 0727   (!) 101.2 °F (38.4 °C) 96 (!) 26 141/88 98 % No Pain     Weight (last 2 days)       Date/Time Weight    02/02/25 0449 60.3 (132.94)            Vital Signs (last 3 days)       Date/Time Temp Pulse Resp BP MAP (mmHg) SpO2 FiO2 (%) Calculated FIO2 (%) - Nasal Cannula O2 Flow Rate (L/min) Nasal Cannula O2 Flow Rate (L/min) O2 Device O2 Interface Device Patient Position - Orthostatic VS Andrea Coma Scale Score Pain    02/04/25 1354 -- -- -- -- -- 92 % -- -- -- -- -- HFNC prongs -- -- --    02/04/25 1136 -- -- -- -- -- -- -- -- -- -- -- -- -- -- No Pain    02/04/25 1050 97.4 °F (36.3 °C) 96 18  112/74 81 91 % -- 140 -- 30 L/min High flow nasal cannula -- Lying -- --    02/04/25 0917 -- 78 -- 152/70 100 -- -- -- -- -- -- -- Sitting -- --    02/04/25 0745 -- -- -- -- -- 96 % -- -- -- -- -- HFNC prongs -- -- --    02/04/25 0730 -- -- -- -- -- -- -- -- -- -- -- -- -- 13 No Pain    02/04/25 0727 98.4 °F (36.9 °C) 69 18 175/100 123 95 % -- 140 -- 30 L/min High flow nasal cannula -- Lying -- --    02/04/25 0400 -- -- -- -- -- 96 % 50 140 -- 30 L/min High flow nasal cannula HFNC prongs -- 13 --    02/04/25 0326 98.1 °F (36.7 °C) 59 20 137/70 85 95 % -- 140 -- 30 L/min High flow nasal cannula -- Lying -- --    02/04/25 0000 -- -- -- -- -- -- -- -- -- -- -- -- -- 13 --    02/03/25 2331 98.3 °F (36.8 °C) 76 20 144/90 107 96 % -- 140 -- 30 L/min High flow nasal cannula -- Lying -- --    02/03/25 2000 -- -- -- -- -- -- -- -- -- -- -- -- -- 13 No Pain    02/03/25 1942 -- -- -- -- -- 97 % 50 140 -- 30 L/min High flow nasal cannula HFNC prongs -- -- --    02/03/25 1939 98.5 °F (36.9 °C) 79 20 151/99 112 97 % -- 140 -- 30 L/min High flow nasal cannula -- Lying -- --    02/03/25 1750 -- -- -- -- -- -- -- -- -- -- -- HFNC prongs -- -- --    02/03/25 1609 98.5 °F (36.9 °C) 84 20 129/76 85 95 % -- -- -- -- -- -- Lying -- --    02/03/25 1530 -- -- -- -- -- -- -- -- -- -- -- -- -- 13 --    02/03/25 1400 -- -- -- -- -- 92 % -- -- -- -- -- HFNC prongs -- -- --    02/03/25 1358 -- -- -- -- -- 90 % -- -- -- -- -- -- -- -- --    02/03/25 1148 98.2 °F (36.8 °C) 89 20 132/81 93 94 % -- -- -- -- High flow nasal cannula -- Lying -- --    02/03/25 1130 -- -- -- -- -- -- -- -- -- -- -- -- -- 13 --    02/03/25 1054 -- 87 -- 170/81 -- -- -- -- -- -- -- -- -- -- --    02/03/25 0906 -- 86 -- 165/96 -- -- -- -- -- -- -- -- -- -- --    02/03/25 0825 99.3 °F (37.4 °C) 85 20 185/90 129 100 % -- -- -- -- Nasal cannula -- Lying -- --    02/03/25 0813 -- -- -- -- -- 98 % -- -- -- -- -- -- -- -- --    02/03/25 0730 -- -- -- -- -- -- -- 36 -- 4 L/min  Nasal cannula -- -- 13 No Pain    02/03/25 0600 -- -- -- -- -- -- -- -- -- -- -- -- -- 13 No Pain    02/03/25 0333 99.8 °F (37.7 °C) 81 28 161/77 111 98 % -- 40 -- 5 L/min Nasal cannula -- Lying -- --    02/03/25 0200 -- -- -- -- -- -- -- -- -- -- -- -- -- 13 --    02/02/25 2303 100.6 °F (38.1 °C) 86 28 151/84 113 90 % -- -- 35 L/min -- High flow nasal cannula -- Lying -- --    02/02/25 2200 -- -- -- -- -- -- -- -- -- -- -- -- -- 13 No Pain    02/02/25 2100 99.6 °F (37.6 °C) 106 28 169/98 120 94 % -- -- -- -- High flow nasal cannula -- Lying -- --    02/02/25 2014 -- -- -- -- -- 92 % 50 -- 35 L/min -- High flow nasal cannula HFNC prongs -- -- --    02/02/25 2000 -- -- -- -- -- -- -- -- -- -- -- -- -- 13 --    02/02/25 1945 -- 93 28 136/68 94 97 % -- -- -- -- High flow nasal cannula -- Sitting -- --    02/02/25 1915 -- 81 27 131/64 92 93 % -- -- -- -- High flow nasal cannula -- Sitting -- --    02/02/25 1845 -- 85 25 140/74 100 93 % -- -- -- -- High flow nasal cannula -- Lying -- --    02/02/25 1800 -- 84 25 123/66 88 93 % -- -- -- -- High flow nasal cannula -- -- -- --    02/02/25 1715 -- 83 22 142/67 98 92 % -- -- -- -- High flow nasal cannula -- Lying -- --    02/02/25 1615 -- 80 22 125/62 89 90 % -- -- -- -- High flow nasal cannula -- Lying -- --    02/02/25 1500 -- 78 22 142/66 95 94 % -- -- -- -- High flow nasal cannula -- Lying -- --    02/02/25 1457 -- -- -- -- -- -- -- -- -- -- -- HFNC prongs -- -- --    02/02/25 1456 -- -- -- -- -- -- 50 -- 35 L/min -- High flow nasal cannula -- -- -- --    02/02/25 1400 98.8 °F (37.1 °C) 77 20 130/60 86 91 % -- -- -- -- High flow nasal cannula -- Lying -- --    02/02/25 1315 -- 76 -- 126/60 87 92 % -- -- -- -- -- -- -- -- --    02/02/25 1215 -- 76 -- 122/59 85 90 % -- -- -- -- -- -- -- -- --    02/02/25 1130 -- 75 24 111/56 80 91 % -- -- -- -- High flow nasal cannula -- Sitting -- --    02/02/25 1115 -- -- -- -- -- -- -- -- -- -- -- HFNC prongs -- -- --    02/02/25 1045 --  80 24 142/63 90 98 % -- -- -- -- High flow nasal cannula -- Sitting -- No Pain    02/02/25 1030 -- 80 22 134/59 85 95 % -- -- -- -- BiPAP -- Lying -- No Pain    02/02/25 1015 -- 72 22 114/54 78 95 % -- -- -- -- High flow nasal cannula -- Lying -- No Pain    02/02/25 1000 -- 72 22 100/49 70 94 % -- -- -- -- High flow nasal cannula -- Lying 12 No Pain    02/02/25 0945 -- 74 22 98/47 68 95 % -- -- -- -- BiPAP -- Lying -- No Pain    02/02/25 0915 -- 74 22 113/53 76 98 % -- -- -- -- High flow nasal cannula -- Lying -- No Pain    02/02/25 0900 -- -- -- -- -- -- -- -- -- -- -- HFNC prongs -- -- --    02/02/25 0832 -- -- -- -- -- -- -- -- 8 L/min -- Mid flow nasal cannula -- -- -- --    02/02/25 0815 -- 76 24 92/49 66 94 % -- -- -- -- BiPAP -- Sitting -- No Pain    02/02/25 0745 -- 75 24 94/44 64 95 % -- -- -- -- BiPAP -- Lying -- No Pain    02/02/25 0727 -- 85 22 109/55 -- 97 % -- -- -- -- BiPAP -- Sitting -- No Pain    02/02/25 0630 -- 81 14 128/62 89 98 % -- -- -- -- BiPAP -- Lying -- --    02/02/25 0615 -- 83 16 142/60 86 98 % -- -- -- -- BiPAP -- Lying -- --    02/02/25 0600 -- 88 23 144/65 94 96 % -- -- -- -- BiPAP -- Lying -- --    02/02/25 0550 -- 94 32 138/80 98 95 % -- -- -- -- BiPAP -- Lying -- --    02/02/25 0545 -- 91 28 157/67 97 97 % -- -- -- -- BiPAP -- Lying -- --    02/02/25 0535 -- 107 43 170/69 99 95 % -- -- -- -- BiPAP -- Lying -- --    02/02/25 0530 -- 127 30 210/77 110 95 % -- -- -- -- BiPAP -- Lying -- --    02/02/25 0515 -- 92 32 175/84 108 93 % -- -- -- -- BiPAP -- Lying -- --    02/02/25 0505 -- -- -- -- -- -- -- -- -- -- -- -- -- 7 --    02/02/25 0500 -- -- -- -- -- 97 % -- -- -- -- -- Full face mask -- -- --    02/02/25 0449 101.2 °F (38.4 °C) 96 26 141/88 -- 98 % -- -- 15 L/min -- Non-rebreather mask -- Lying -- --              Pertinent Labs/Diagnostic Test Results:   Radiology:  CT head without contrast   Final Interpretation by Shaka Elizabeth DO (02/02 0741)      No acute intracranial  abnormality.  Chronic microangiopathic changes.      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               Workstation performed: [id number]         CT spine cervical without contrast   Final Interpretation by Shaka Elizabeth DO (02/02 0747)      No cervical spine fracture or traumatic malalignment.                  Workstation performed: [id number]         CT chest abdomen pelvis w contrast   Final Interpretation by Shaka Elizabeth DO (02/02 0829)      1. Patchy opacity posterolateral right upper lobe could represent atelectasis or pneumonia. Little change in chronic right lower lobe consolidation.      2. Background emphysema. Slowly enlarging 8 mm right middle lobe nodule. Possibility of neoplasm cannot be excluded. Based on current Fleischner Society 2017 Guidelines on incidental pulmonary nodule, follow-up non-contrast CT is recommended at 6 months    from the initial examination and, if stable at that time, an additional follow-up is recommended for 18-24 months from the initial examination.      3. Thickening of the urinary bladder with hyperemia involving posterior lateral bladder diverticulum. Correlate with urinalysis regarding cystitis.      4. 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 No change in aneurysmal dilatation junction distal ascending aorta/arch.      5. Question thickening of the proximal esophagus. Consider esophagram.      6. Diffuse colonic diverticulosis without diverticulitis. Moderate to large amount of colonic stool suggesting constipation.      7. Cholelithiasis.      Additional incidental findings as above.      The study was marked in EPIC for follow-up.            Workstation performed: [id number]         XR chest portable   ED Interpretation by Puneet East MD (02/02 0619)   Right sided vascular congestion.  No pleural effusion or focal infiltrate       Final Interpretation by Kelsy Rivera MD (02/02 1105)      Mild pulmonary venous congestion.      Moderate opacity in the right lower lobe. See subsequent chest CT.      Emphysema.            Workstation performed: [id number]           Cardiology:  ECG 12 lead   Final Result by Gaston Carl DO (02/03 0605)   Sinus rhythm with Premature supraventricular complexes   Nonspecific ST and T wave abnormality   Abnormal ECG   When compared with ECG of 02-Feb-2025 04:56, (unconfirmed)   QRS voltage has decreased   ST no longer elevated in Inferior leads   ST no longer elevated in Lateral leads   Nonspecific T wave abnormality, worse in Anterolateral leads   Confirmed by Gaston Carl ([id number]) on 2/3/2025 6:05:49 AM      ECG 12 lead   Final Result by Gaston Carl DO (02/03 0605)   Sinus rhythm with Premature supraventricular complexes   Otherwise normal ECG   When compared with ECG of 03-Sep-2024 23:17,   ST elevation now present in Lateral leads   Confirmed by Gaston Carl ([id number]) on 2/3/2025 6:05:43 AM        GI:  No orders to display       Results from last 7 days   Lab Units 02/02/25  0610 02/02/25  0456   SARS-COV-2  Not Detected Negative     Results from last 7 days   Lab Units 02/04/25  0638 02/03/25  0501 02/02/25  0456   WBC Thousand/uL 5.86 6.47 8.13   HEMOGLOBIN g/dL 10.8* 10.3* 11.2*   HEMATOCRIT % 37.6 35.7* 39.6   PLATELETS Thousands/uL 96* 86* 91*   TOTAL NEUT ABS Thousands/µL  --  5.27 6.31         Results from last 7 days   Lab Units 02/04/25  0638 02/03/25  0501 02/02/25  0538 02/02/25  0456   SODIUM mmol/L 147 146  --  146   POTASSIUM mmol/L 5.2 4.4  --  4.2   CHLORIDE mmol/L 99 97  --  93*   CO2 mmol/L >45* >45*  --  >45*   BUN mg/dL 33* 26*  --  22   CREATININE mg/dL 0.83 0.85  --  0.81   EGFR ml/min/1.73sq m 79 78  --  79   CALCIUM mg/dL 9.5 9.4  --  10.1   CALCIUM, IONIZED mmol/L  --   --  1.11*  --    MAGNESIUM mg/dL  --   --  2.4  --    PHOSPHORUS mg/dL  --   --  2.4  --      Results from last  7 days   Lab Units 02/02/25  0456   AST U/L 23   ALT U/L 13   ALK PHOS U/L 54   TOTAL PROTEIN g/dL 7.6   ALBUMIN g/dL 4.1   TOTAL BILIRUBIN mg/dL 0.68     Results from last 7 days   Lab Units 02/03/25  2208   POC GLUCOSE mg/dl 136     Results from last 7 days   Lab Units 02/04/25  0638 02/03/25  0501 02/02/25  0456   GLUCOSE RANDOM mg/dL 147* 90 102     Results from last 7 days   Lab Units 02/02/25  1143   PH ART  7.365   PCO2 ART mm Hg 79.4*   PO2 ART mm Hg 69.3*   HCO3 ART mmol/L 44.4*   BASE EXC ART mmol/L 16.0   O2 CONTENT ART mL/dL 13.6*   O2 HGB, ARTERIAL % 92.7*   ABG SOURCE  Radial, Left   NON VENT TYPE HFNC  HFNC Flow     Results from last 7 days   Lab Units 02/02/25  0456   PH DUSTIN  7.463*   PCO2 DUSTIN mm Hg 65.2*   PO2 DUSTIN mm Hg 51.3*   HCO3 DUSTIN mmol/L 45.6*   BASE EXC DUSTIN mmol/L 18.5   O2 CONTENT DUSTIN ml/dL 15.0   O2 HGB, VENOUS % 85.4*             Results from last 7 days   Lab Units 02/02/25  0724 02/02/25  0456   HS TNI 0HR ng/L  --  23   HS TNI 2HR ng/L 25  --    HSTNI D2 ng/L 2  --          Results from last 7 days   Lab Units 02/02/25  0456   PROTIME seconds 13.8   INR  1.04   PTT seconds 26         Results from last 7 days   Lab Units 02/03/25  0501 02/02/25  0456   PROCALCITONIN ng/ml 0.12 0.11     Results from last 7 days   Lab Units 02/02/25  0456   LACTIC ACID mmol/L 1.2             Results from last 7 days   Lab Units 02/02/25  0456   BNP pg/mL 99       Results from last 7 days   Lab Units 02/02/25  0610 02/02/25  0456   INFLUENZA A PCR   --  Negative   INFLUENZA B PCR   --  Negative   INFLUENZA B  Not Detected  --    RSV PCR   --  Negative   RESPIRATORY SYNCYTIAL VIRUS  Not Detected  --      Results from last 7 days   Lab Units 02/02/25  0610   ADENOVIRUS  Not Detected   BORDETELLA PARAPERTUSSIS  Not Detected   BORDETELLA PERTUSSIS  Not Detected   CHLAMYDIA PNEUMONIAE  Not Detected   CORONAVIRUS 229E  Not Detected   CORONAVIRUS HKU1  Not Detected   CORONAVIRUS NL63  Not Detected   CORONAVIRUS  OC43  Not Detected   METAPNEUMOVIRUS  Not Detected   RHINOVIRUS  Not Detected   MYCOPLASMA PNEUMONIAE  Not Detected   PARAINFLUENZA 1  Not Detected   PARAINFLUENZA 2  Not Detected   PARAINFLUENZA 3  Not Detected   PARAINFLUENZA 4  Not Detected     Results from last 7 days   Lab Units 02/02/25  0458 02/02/25  0456   BLOOD CULTURE  No Growth at 48 hrs. No Growth at 48 hrs.         Past Medical History:   Diagnosis Date    Acute metabolic encephalopathy 06/13/2020    Age-related cataract of right eye 04/04/2023    patient is medically cleared and presents with low risk of complication with this upcoming R cataract surgery.    Anemia     Aneurysm, aorta, thoracic (HCC)     Appendicolith     Ascending aortic aneurysm (HCC)     3.7    Asthma     BPH (benign prostatic hyperplasia)     CAD (coronary artery disease)     noted on CT scan    CHF (congestive heart failure) ([facility])     COPD (chronic obstructive pulmonary disease) ([facility])     Delirium 04/25/2024    Descending thoracic aortic aneurysm (HCC)     Diabetes mellitus (HCC)     Diverticulosis     Former tobacco use     GERD (gastroesophageal reflux disease)     History of DVT (deep vein thrombosis)     Left leg    History of transfusion     Hypertension     Hypoxemia 04/30/2023    Inguinal hernia     right    Nephrolithiasis     Oxygen dependent     2LNC    Oxygen dependent     Pneumonia     Pre-diabetes     Prostate calculus     PVD (peripheral vascular disease) ([facility])     Recurrent right inguinal hernia w incarcertion 01/04/2023    SIRS (systemic inflammatory response syndrome) ([facility]) 09/04/2024    Solitary pulmonary nodule on lung CT 02/02/2025    8 x 5 mm right middle lobe nodule, most recently 5 x 3 mm (3/163) and about 4 x 2 mm in January 2024.       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[facility])      Varicose vein of leg     b/l     Present on Admission:   Acute exacerbation of chronic obstructive pulmonary disease (COPD) (HCC)   Pulmonary hypertension (HCC)   Acute on chronic respiratory failure with hypoxia and hypercapnia (HCC)   Chronic diastolic (congestive) heart failure (HCC)   JAY treated with BiPAP   Hypertension      Admitting Diagnosis: Altered mental status [R41.82]  Altered mental state [R41.82]  Pneumonia [J18.9]  COPD, severe (HCC) [J44.9]  Chronic respiratory failure with hypoxia and hypercapnia (HCC) [J96.11, J96.12]  Age/Sex: 89 y.o. male    Network Utilization Review Department  ATTENTION: Please call with any questions or concerns to [phone] and carefully listen to the prompts so that you are directed to the right person. All voicemails are confidential.   For Discharge needs, contact Care Management DC Support Team at [phone] opt. [phone]  Send all requests for admission clinical reviews, approved or denied determinations and any other requests to dedicated fax number below belonging to the campus where the patient is receiving treatment. List of dedicated fax numbers for the Facilities:  FACILITY NAME UR FAX NUMBER   ADMISSION DENIALS (Administrative/Medical Necessity) [phone]   DISCHARGE SUPPORT TEAM (NETWORK) [phone]   PARENT CHILD HEALTH (Maternity/NICU/Pediatrics) [phone]   [facility] [phone]   [facility] [phone]   [facility] [phone]   [facility] [phone]   [facility] [phone]   [facility] [phone]   [facility] [phone]   [facility] [phone]   [facility] [phone]   [facility] [phone]   General acute hospital [phone]   [facility]  [facility] [phone]

## 2025-02-03 NOTE — ASSESSMENT & PLAN NOTE
very severe COPD, FEV1 0.54L 25% predicted in 2024  IV steroids  Nebulized bronchodilators  3L supplemental oxygen with exertion  Titrate off oxygen as able

## 2025-02-03 NOTE — ASSESSMENT & PLAN NOTE
Patient has history of COPD FEV1 0.54 L 25% protected in 2024.  At baseline requires nebulization therapy, not on azithromycin due to hearing loss.    -Presented with acute exacerbation and altered mentation carbon dioxide retention.  -Continue with IV steroids, azithromycin  -Continue with nebulization.  -Palliative care consulted meeting tomorrow 2/4/25  -Pulmonology on board appreciate recs

## 2025-02-03 NOTE — PLAN OF CARE
Problem: Potential for Falls  Goal: Patient will remain free of falls  Description: INTERVENTIONS:  - Educate patient/family on patient safety including physical limitations  - Instruct patient to call for assistance with activity   - Consult OT/PT to assist with strengthening/mobility   - Keep Call bell within reach  - Keep bed low and locked with side rails adjusted as appropriate  - Keep care items and personal belongings within reach  - Initiate and maintain comfort rounds  - Make Fall Risk Sign visible to staff  - Offer Toileting every  Hours, in advance of need  - Initiate/Maintain alarm  - Obtain necessary fall risk management equipment:   - Apply yellow socks and bracelet for high fall risk patients  - Consider moving patient to room near nurses station  Outcome: Progressing     Problem: PAIN - ADULT  Goal: Verbalizes/displays adequate comfort level or baseline comfort level  Description: Interventions:  - Encourage patient to monitor pain and request assistance  - Assess pain using appropriate pain scale  - Administer analgesics based on type and severity of pain and evaluate response  - Implement non-pharmacological measures as appropriate and evaluate response  - Consider cultural and social influences on pain and pain management  - Notify physician/advanced practitioner if interventions unsuccessful or patient reports new pain  Outcome: Progressing     Problem: DISCHARGE PLANNING  Goal: Discharge to home or other facility with appropriate resources  Description: INTERVENTIONS:  - Identify barriers to discharge w/patient and caregiver  - Arrange for needed discharge resources and transportation as appropriate  - Identify discharge learning needs (meds, wound care, etc.)  - Arrange for interpretive services to assist at discharge as needed  - Refer to Case Management Department for coordinating discharge planning if the patient needs post-hospital services based on physician/advanced practitioner order  or complex needs related to functional status, cognitive ability, or social support system  Outcome: Progressing     Problem: Knowledge Deficit  Goal: Patient/family/caregiver demonstrates understanding of disease process, treatment plan, medications, and discharge instructions  Description: Complete learning assessment and assess knowledge base.  Interventions:  - Provide teaching at level of understanding  - Provide teaching via preferred learning methods  Outcome: Progressing     Problem: RESPIRATORY - ADULT  Goal: Achieves optimal ventilation and oxygenation  Description: INTERVENTIONS:  - Assess for changes in respiratory status  - Assess for changes in mentation and behavior  - Position to facilitate oxygenation and minimize respiratory effort  - Oxygen administered by appropriate delivery if ordered  - Initiate smoking cessation education as indicated  - Encourage broncho-pulmonary hygiene including cough, deep breathe, Incentive Spirometry  - Assess the need for suctioning and aspirate as needed  - Assess and instruct to report SOB or any respiratory difficulty  - Respiratory Therapy support as indicated  Outcome: Progressing

## 2025-02-03 NOTE — ASSESSMENT & PLAN NOTE
Patient was seen by palliative this morning, meeting with family scheduled for tomorrow 2//2025 to discuss CODE STATUS and further care.

## 2025-02-03 NOTE — CONSULTS
Consultation - Palliative Care   Name: Severiano Feliciano 89 y.o. male I MRN: [id number]  Unit/Bed#: E4 -01 I Date of Admission: 2/2/2025   Date of Service: 2/3/2025 I Hospital Day: 1   Inpatient consult to Palliative Care  Consult performed by: Jennifer Paige Bloch, CRNP  Consult ordered by: Calin Chacon PA-C        Physician Requesting Evaluation: Christina Price MD   Reason for Evaluation / Principal Problem: COPD and CHF    Assessment & Plan  Acute on chronic respiratory failure with hypoxia and hypercapnia (HCC)    Acute exacerbation of chronic obstructive pulmonary disease (COPD) (HCC)  very severe COPD, FEV1 0.54L 25% predicted in 2024  IV steroids  Nebulized bronchodilators  3L supplemental oxygen with exertion  Titrate off oxygen as able  Chronic diastolic (congestive) heart failure (HCC)  Wt Readings from Last 3 Encounters:   02/02/25 60.3 kg (132 lb 15 oz)   11/05/24 60.1 kg (132 lb 9.6 oz)   09/20/24 59.3 kg (130 lb 12.8 oz)   Weight stable, diurese per primary team           Palliative care encounter  Palliative diagnoses: COPD, CHF  Outpatient Palliative provider: Known to palliative medicine through previous admissions.  No outpatient follow-up has been established    Goals of care  Level 1 code status  Disease focused care 1.   Concerns introduced today include:  Introduced Palliative Medicine  Discussed CODE STATUS  Scheduled a family meeting with waldo Bocanegra and patient's spouse Viridiana for tomorrow at 1:00.  Daljit knows we will be discussing goals of care and CODE STATUS at meeting.  Will continue discussions regarding GOC as patient's clinical presentation evolves.    Social support provided for send Dalijt:  3 adult children support patient  Daljit reports they have never discussed CODE STATUS for patient  Reports his mother is in good health and takes care of patient with the assistance of children  Supportive listening provided  Normalized experience of patient/family  Provided anxiety  containment  Provided anticipatory guidance  Living situation -patient lives with his wife and 2 of his children, son Renny and daughter Rocio    Follow up  Palliative Care will continue to follow and goals of care discussions will be ongoing.  We will meet with family tomorrow at 1:00.  Please reach out via [employer] Secure Chat if questions or concerns arise.    I have reviewed the patient's controlled substance dispensing history in the Prescription Drug Monitoring Program in compliance with the [facility] regulations before prescribing any controlled substances.  Last refills  No opioid or benzo refills in [place] over past year.    Decisional apparatus:  Patient lacks capacity based on chart review.  Patient's substitute decision maker would default to spouse Viridiana Zambrano by [place] Act 169.  Will need to investigate family dynamics at family meeting tomorrow as patient's spouse has a different last name than patient and children  Advance Directive/Living Will, POLST and POA Forms: None on file  ER contacts:  Name Relation Home Work Mobile   Viridiana Zambrano Spouse [phone]   Severiano,Julio Son   [phone]   Jim Patino Grandparent   [phone]   Renny Fernandez Son [phone]   Rocio Fernandez Daughter   [phone]     We appreciate the invitation to be involved in this patient's care.  We will continue to follow throughout this hospitalization.  Please do not hesitate to reach our on call provider through our clinic answering service at [phone] should you have acute symptom control concerns.    Jennifer P. Bloch, MSN, CRNP, ACHPN  Palliative and Supportive Care  Clinic/Answering Service: [phone]  You can find me on [employer] Secure Chat       History of Present Illness   HPI: Severiano Feliciano is a 89 y.o. year old male who presents with severe COPD, CHF and chronic hypoxic and hypercapnic respiratory failure.  He was brought into the ED at [facility] on 2/2/2025 secondary to a  cough, shortness of breath, altered mental status and significant hypoxia.  Upon admission, he required high flow O2 and then BiPAP.  He is admitted for medical management of COPD exacerbation.  He is currently being maintained on 3 L of nasal cannula oxygen.  At home he does use oxygen intermittently during the day and BiPAP at night.  This is patient's third admission over the last 6 months for similar symptoms Palliative Medicine has been consulted to assist with symptom management and goals of care counseling during this admission.     Currently patient remains confused.    Spoke to son Daljit via telephone.  Introduced palliative medicine and the services we provide for patient's in the hospital.  Discussed COPD and the progressive nature of COPD.  Discussed heart failure and the concerns regarding a combination of COPD and heart failure causing frequent readmissions.  Daljit states he has been told this before and is understanding of information.  Discussed CODE STATUS and he reports he has never spoken with his father about CPR/intubation nor has he heard his mother and father discussing his wishes.  Daljit is agreeable to meeting in person tomorrow at 1:00 to assist patient's spouse Viridiana and patient in a goals of care conversation and discussion related to CODE STATUS.    Review of Systems   Unable to perform ROS: Mental status change     I have reviewed the patient's PMH, PSH, Social History, Family History, Meds, and Allergies    Objective :  Temp:  [98.8 °F (37.1 °C)-100.6 °F (38.1 °C)] 99.8 °F (37.7 °C)  HR:  [] 81  BP: ()/(44-98) 161/77  Resp:  [20-28] 28  SpO2:  [90 %-98 %] 98 %  O2 Device: Nasal cannula  Nasal Cannula O2 Flow Rate (L/min):  [5 L/min] 5 L/min  FiO2 (%):  [50] 50    Physical Exam  No PE today as consult was completed with family      Lab Results: I have reviewed the following results:  Lab Results   Component Value Date/Time    SODIUM 146 02/03/2025 05:01 AM    SODIUM 142  04/19/2023 03:16 PM    K 4.4 02/03/2025 05:01 AM    K 5.5 (H) 04/19/2023 03:16 PM    BUN 26 (H) 02/03/2025 05:01 AM    BUN 25 04/19/2023 03:16 PM    CREATININE 0.85 02/03/2025 05:01 AM    CREATININE 0.97 04/19/2023 03:16 PM    GLUC 90 02/03/2025 05:01 AM    GLUC 153 (H) 04/19/2023 03:16 PM    CALCIUM 9.4 02/03/2025 05:01 AM    CALCIUM 8.7 04/19/2023 03:16 PM    AST 23 02/02/2025 04:56 AM    AST 24 04/19/2023 04:51 AM    ALT 13 02/02/2025 04:56 AM    ALT 21 04/19/2023 04:51 AM    ALB 4.1 02/02/2025 04:56 AM    ALB 3.4 (L) 04/19/2023 04:51 AM    TP 7.6 02/02/2025 04:56 AM    TP 6.0 (L) 04/19/2023 04:51 AM    EGFR 78 02/03/2025 05:01 AM    EGFR 77 04/19/2023 03:16 PM    EGFR 57 (L) 05/28/2020 08:00 PM     Lab Results   Component Value Date/Time    HGB 10.3 (L) 02/03/2025 05:01 AM    HGB 14.2 03/01/2014 04:55 AM    WBC 6.47 02/03/2025 05:01 AM    WBC 13.51 (H) 03/01/2014 04:55 AM    PLT 86 (L) 02/03/2025 05:01 AM     03/01/2014 04:55 AM    INR 1.04 02/02/2025 04:56 AM    INR 1.1 05/28/2020 08:00 PM    PTT 26 02/02/2025 04:56 AM     Lab Results   Component Value Date/Time    VCH5KPTAWNIY 0.258 (L) 04/25/2024 06:46 AM       Imaging Results Review: I reviewed radiology reports from this admission including: chest xray, CT chest, CT abdomen/pelvis, and CT head.  Other Study Results Review: EKG was reviewed.       Administrative Statements   I have spent a total time of 60 minutes in caring for this patient on the day of the visit/encounter including Prognosis, Risks and benefits of tx options, Patient and family education, Impressions, Counseling / Coordination of care, Documenting in the medical record, Reviewing / ordering tests, medicine, procedures  , and Obtaining or reviewing history  .

## 2025-02-03 NOTE — ASSESSMENT & PLAN NOTE
-At baseline, on 3L supplemental oxygen with exertion  -Currently on HFNC 35L, 50% FiO2  -Continue to wean supplemental oxygen to maintain SpO2 > 88%  -Encourage OOB as tolerated, incentive spirometry  -Ambulatory O2 evaluation prior to discharge

## 2025-02-03 NOTE — ASSESSMENT & PLAN NOTE
Wt Readings from Last 3 Encounters:   02/02/25 60.3 kg (132 lb 15 oz)   11/05/24 60.1 kg (132 lb 9.6 oz)   09/20/24 59.3 kg (130 lb 12.8 oz)     Echo on 4/11/2023 reports normal ventricular cavity size with grade 1 diastolic dysfunction.  As per chart review patient takes torsemide 5 mg daily but as per wife and son there was some issues with allergies to furosemide and torsemide.  The family said they will find out the paperwork to know the exact medication with which patient is allergic and then can be restarted tomorrow

## 2025-02-03 NOTE — ASSESSMENT & PLAN NOTE
Right middle lobe nodule endorsed in January 2024, outpatient follow-up not needed as per pulmonology

## 2025-02-03 NOTE — ASSESSMENT & PLAN NOTE
Wt Readings from Last 3 Encounters:   02/02/25 60.3 kg (132 lb 15 oz)   11/05/24 60.1 kg (132 lb 9.6 oz)   09/20/24 59.3 kg (130 lb 12.8 oz)   Weight stable, diurese per primary team

## 2025-02-03 NOTE — PROGRESS NOTES
Progress Note - Hospitalist   Name: Severiano Feliciano 89 y.o. 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    Acute exacerbation of chronic obstructive pulmonary disease (COPD) (HCC)  Patient has history of COPD FEV1 0.54 L 25% protected in 2024.  At baseline requires nebulization therapy, not on azithromycin due to hearing loss.    -Presented with acute exacerbation and altered mentation carbon dioxide retention.  -Continue with IV steroids, azithromycin  -Continue with nebulization.  -Palliative care consulted meeting tomorrow 2/4/25  -Pulmonology on board appreciate recs  Chronic diastolic (congestive) heart failure (HCC)  Wt Readings from Last 3 Encounters:   02/02/25 60.3 kg (132 lb 15 oz)   11/05/24 60.1 kg (132 lb 9.6 oz)   09/20/24 59.3 kg (130 lb 12.8 oz)     Echo on 4/11/2023 reports normal ventricular cavity size with grade 1 diastolic dysfunction.  As per chart review patient takes torsemide 5 mg daily but as per wife and son there was some issues with allergies to furosemide and torsemide.  The family said they will find out the paperwork to know the exact medication with which patient is allergic and then can be restarted tomorrow    Abnormal CT of the chest  Right middle lobe nodule endorsed in January 2024, outpatient follow-up not needed as per pulmonology  Solitary pulmonary nodule on lung CT  New right upper lobe opacity on imaging no leukocytosis procalcitonin  negative  Pulmonology on board  Palliative care encounter  Patient was seen by palliative this morning, meeting with family scheduled for tomorrow 2//2025 to discuss CODE STATUS and further care.    VTE Pharmacologic Prophylaxis:    Enoxaparin    Mobility:   Basic Mobility Inpatient Raw Score: 15  JH-HLM Goal: 4: Move to chair/commode  JH-HLM Achieved: 2: Bed activities/Dependent transfer      Education and Discussions with Family / Patient: Updated  (son) via phone.    Current Length of Stay: 1 day(s)  Current Patient Status: Inpatient     Discharge Plan: Anticipate discharge in 24-48 hrs to after case management review of the disposition    Code Status: Level 1 - Full Code    Subjective   Sleeping comfortably, wife asked to wake him up as he did not had a good night.    Objective :  Temp:  [98.2 °F (36.8 °C)-100.6 °F (38.1 °C)] 98.5 °F (36.9 °C)  HR:  [] 84  BP: (129-185)/(64-98) 129/76  Resp:  [20-28] 20  SpO2:  [90 %-100 %] 95 %  O2 Device: High flow nasal cannula  Nasal Cannula O2 Flow Rate (L/min):  [4 L/min-5 L/min] 4 L/min  FiO2 (%):  [50] 50    Body mass index is 23.55 kg/m².     Input and Output Summary (last 24 hours):     Intake/Output Summary (Last 24 hours) at 2/3/2025 1824  Last data filed at 2/3/2025 1309  Gross per 24 hour   Intake 290 ml   Output --   Net 290 ml       Physical Exam  Vitals and nursing note reviewed.   Constitutional:       General: He is not in acute distress.     Appearance: He is well-developed.   HENT:      Head: Normocephalic and atraumatic.   Eyes:      Conjunctiva/sclera: Conjunctivae normal.   Cardiovascular:      Rate and Rhythm: Normal rate and regular rhythm.      Heart sounds: No murmur heard.  Pulmonary:      Effort: Pulmonary effort is normal. No respiratory distress.      Breath sounds: Normal breath sounds.   Abdominal:      Palpations: Abdomen is soft.      Tenderness: There is no abdominal tenderness.   Musculoskeletal:         General: No  swelling.      Cervical back: Neck supple.   Skin:     General: Skin is warm and dry.      Capillary Refill: Capillary refill takes less than 2 seconds.   Neurological:      Mental Status: He is alert.      Comments: Patient is sleeping, as per family much improved   Psychiatric:         Mood and Affect: Mood normal.           Lines/Drains:        Telemetry:  Telemetry Orders (From admission, onward)               24 Hour Telemetry Monitoring  Continuous x 24 Hours (Telem)        Expiring   Question:  Reason for 24 Hour Telemetry  Answer:  Arrhythmias requiring acute medical intervention / PPM or ICD malfunction                                    Lab Results: I have reviewed the following results:   Results from last 7 days   Lab Units 02/03/25  0501   WBC Thousand/uL 6.47   HEMOGLOBIN g/dL 10.3*   HEMATOCRIT % 35.7*   PLATELETS Thousands/uL 86*   SEGS PCT % 81*   LYMPHO PCT % 9*   MONO PCT % 9   EOS PCT % 0     Results from last 7 days   Lab Units 02/03/25  0501 02/02/25  0456   SODIUM mmol/L 146 146   POTASSIUM mmol/L 4.4 4.2   CHLORIDE mmol/L 97 93*   CO2 mmol/L >45* >45*   BUN mg/dL 26* 22   CREATININE mg/dL 0.85 0.81   CALCIUM mg/dL 9.4 10.1   ALBUMIN g/dL  --  4.1   TOTAL BILIRUBIN mg/dL  --  0.68   ALK PHOS U/L  --  54   ALT U/L  --  13   AST U/L  --  23   GLUCOSE RANDOM mg/dL 90 102     Results from last 7 days   Lab Units 02/02/25  0456   INR  1.04             Results from last 7 days   Lab Units 02/03/25  0501 02/02/25  0456   LACTIC ACID mmol/L  --  1.2   PROCALCITONIN ng/ml 0.12 0.11       Recent Cultures (last 7 days):   Results from last 7 days   Lab Units 02/02/25  0458 02/02/25  0456   BLOOD CULTURE  No Growth at 24 hrs. No Growth at 24 hrs.     XR chest portable  Result Date: 2/2/2025  Impression: Mild pulmonary venous congestion. Moderate opacity in the right lower lobe. See subsequent chest CT. Emphysema. Workstation performed: [id number]     CT chest abdomen pelvis w contrast  Result Date:  2/2/2025  Impression: 1. Patchy opacity posterolateral right upper lobe could represent atelectasis or pneumonia. Little change in chronic right lower lobe consolidation. 2. Background emphysema. Slowly enlarging 8 mm right middle lobe nodule. Possibility of neoplasm cannot be excluded. Based on current Fleischner Society 2017 Guidelines on incidental pulmonary nodule, follow-up non-contrast CT is recommended at 6 months from the initial examination and, if stable at that time, an additional follow-up is recommended for 18-24 months from the initial examination. 3. Thickening of the urinary bladder with hyperemia involving posterior lateral bladder diverticulum. Correlate with urinalysis regarding cystitis. 4. 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 No change in aneurysmal dilatation junction distal ascending aorta/arch. 5. Question thickening of the proximal esophagus. Consider esophagram. 6. Diffuse colonic diverticulosis without diverticulitis. Moderate to large amount of colonic stool suggesting constipation. 7. Cholelithiasis. Additional incidental findings as above. The study was marked in EPIC for follow-up. Workstation performed: [id number]     CT spine cervical without contrast  Result Date: 2/2/2025  Impression: No cervical spine fracture or traumatic malalignment. Workstation performed: [id number]     CT head without contrast  Result Date: 2/2/2025  Impression: No acute intracranial abnormality.  Chronic microangiopathic changes. 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 Workstation performed: [id number]         Last 24 Hours Medication List:     Current Facility-Administered Medications:     acetaminophen (TYLENOL) tablet 975 mg, Q8H PRN    aluminum-magnesium hydroxide-simethicone (MAALOX) oral suspension 30 mL, Q4H PRN    azithromycin (ZITHROMAX) 500 mg in sodium chloride 0.9 % 250 mL  IVPB, Q24H, Last Rate: 500 mg (02/03/25 1532)    ceftriaxone (ROCEPHIN) 1 g/50 mL in dextrose IVPB, Q24H, Last Rate: 1,000 mg (02/03/25 1808)    divalproex sodium (DEPAKOTE SPRINKLE) capsule 125 mg, HS PRN **OR** valproate (DEPACON) 125 mg in sodium chloride 0.9 % 50 mL IVPB, HS PRN, Last Rate: 125 mg (02/02/25 2021)    enoxaparin (LOVENOX) subcutaneous injection 40 mg, Daily    guaiFENesin (ROBITUSSIN) oral liquid 200 mg, Q4H PRN    ipratropium (ATROVENT) 0.02 % inhalation solution 0.5 mg, TID    levalbuterol (XOPENEX) inhalation solution 1.25 mg, TID **AND** [DISCONTINUED] sodium chloride 0.9 % inhalation solution 3 mL, TID    losartan (COZAAR) tablet 25 mg, BID    melatonin tablet 3 mg, HS PRN    methylPREDNISolone sodium succinate (Solu-MEDROL) injection 40 mg, Q8H    polyethylene glycol (MIRALAX) packet 17 g, Daily PRN    Administrative Statements   Today, Patient Was Seen By: Christina Price MD  I have spent a total time of >35 minutes in caring for this patient on the day of the visit/encounter including Diagnostic results, Prognosis, Risks and benefits of tx options, Instructions for management, Patient and family education, Importance of tx compliance, Risk factor reductions, Impressions, Counseling / Coordination of care, Documenting in the medical record, Reviewing / ordering tests, medicine, procedures  , Obtaining or reviewing history  , and Communicating with other healthcare professionals .    **Please Note: This note may have been constructed using a voice recognition system.**

## 2025-02-03 NOTE — ASSESSMENT & PLAN NOTE
-Very severe COPD, FEV1 0.54L 25% predicted in 2024  -Baseline regimen includes all nebulized therapy. Not on chronic azithromycin due to hearing loss  -Presenting with acute exacerbation  -Continue IV steroids  -Start azithromycin x3 days  -Continue nebulized bronchodilators  -Overall poor pulmonary prognosis. Hospice would be appropriate, as would DNR/DNI.

## 2025-02-03 NOTE — ASSESSMENT & PLAN NOTE
Currently requiring high flow nasal cannula 35 L, 50% FiO2  Secondary to COPD exacerbation  Continue to wean off supplemental oxygen to maintain SpO2 greater than 88%  At baseline takes 3 L supplemental oxygen with exertion.

## 2025-02-03 NOTE — QUICK NOTE
Night Progress Note 02/02/25:  Paged regarding patient restless, anxious, unable to redirect. Hx of hospital acquired delirium in setting of acute infection, hypercarbic respiratory failure and steroid use.     Delirium   Plan:  Hx of tongue swelling with zyprexa, would avoid antipsychotics, but appeared to tolerate seroquel/haldol in prior admission   Geriatrics recommending low dose depakote previous admissions, will trial. Has had fair response   Encourage BiPAP HS for as long as possible  Delirium precautions  1:1 if family leaves tonight  With end stage COPD, hospice appropriate, will consult palliative care to assist in goals of care conversations with patient and family

## 2025-02-04 LAB
BUN SERPL-MCNC: 33 MG/DL (ref 5–25)
CALCIUM SERPL-MCNC: 9.5 MG/DL (ref 8.4–10.2)
CHLORIDE SERPL-SCNC: 99 MMOL/L (ref 96–108)
CO2 SERPL-SCNC: >45 MMOL/L (ref 21–32)
CREAT SERPL-MCNC: 0.83 MG/DL (ref 0.6–1.3)
ERYTHROCYTE [DISTWIDTH] IN BLOOD BY AUTOMATED COUNT: 12.9 % (ref 11.6–15.1)
GFR SERPL CREATININE-BSD FRML MDRD: 79 ML/MIN/1.73SQ M
GLUCOSE SERPL-MCNC: 147 MG/DL (ref 65–140)
HCT VFR BLD AUTO: 37.6 % (ref 36.5–49.3)
HGB BLD-MCNC: 10.8 G/DL (ref 12–17)
MCH RBC QN AUTO: 31.5 PG (ref 26.8–34.3)
MCHC RBC AUTO-ENTMCNC: 28.7 G/DL (ref 31.4–37.4)
MCV RBC AUTO: 110 FL (ref 82–98)
PLATELET # BLD AUTO: 96 THOUSANDS/UL (ref 149–390)
PMV BLD AUTO: 9.9 FL (ref 8.9–12.7)
POTASSIUM SERPL-SCNC: 5.2 MMOL/L (ref 3.5–5.3)
RBC # BLD AUTO: 3.43 MILLION/UL (ref 3.88–5.62)
SODIUM SERPL-SCNC: 147 MMOL/L (ref 135–147)
WBC # BLD AUTO: 5.86 THOUSAND/UL (ref 4.31–10.16)

## 2025-02-04 PROCEDURE — 99232 SBSQ HOSP IP/OBS MODERATE 35: CPT

## 2025-02-04 PROCEDURE — 94762 N-INVAS EAR/PLS OXIMTRY CONT: CPT

## 2025-02-04 PROCEDURE — 94760 N-INVAS EAR/PLS OXIMETRY 1: CPT

## 2025-02-04 PROCEDURE — 80048 BASIC METABOLIC PNL TOTAL CA: CPT

## 2025-02-04 PROCEDURE — 99232 SBSQ HOSP IP/OBS MODERATE 35: CPT | Performed by: NURSE PRACTITIONER

## 2025-02-04 PROCEDURE — 99232 SBSQ HOSP IP/OBS MODERATE 35: CPT | Performed by: INTERNAL MEDICINE

## 2025-02-04 PROCEDURE — 94640 AIRWAY INHALATION TREATMENT: CPT

## 2025-02-04 PROCEDURE — 85027 COMPLETE CBC AUTOMATED: CPT

## 2025-02-04 RX ORDER — METHYLPREDNISOLONE SODIUM SUCCINATE 40 MG/ML
40 INJECTION, POWDER, LYOPHILIZED, FOR SOLUTION INTRAMUSCULAR; INTRAVENOUS DAILY
Status: DISCONTINUED | OUTPATIENT
Start: 2025-02-05 | End: 2025-02-05

## 2025-02-04 RX ORDER — METHYLPREDNISOLONE SODIUM SUCCINATE 40 MG/ML
40 INJECTION, POWDER, LYOPHILIZED, FOR SOLUTION INTRAMUSCULAR; INTRAVENOUS EVERY 12 HOURS SCHEDULED
Status: DISCONTINUED | OUTPATIENT
Start: 2025-02-04 | End: 2025-02-04

## 2025-02-04 RX ADMIN — CEFTRIAXONE 1000 MG: 1 INJECTION, POWDER, FOR SOLUTION INTRAMUSCULAR; INTRAVENOUS at 17:31

## 2025-02-04 RX ADMIN — IPRATROPIUM BROMIDE 0.5 MG: 0.5 SOLUTION RESPIRATORY (INHALATION) at 13:54

## 2025-02-04 RX ADMIN — MELATONIN 3 MG: 3 TAB ORAL at 21:59

## 2025-02-04 RX ADMIN — LEVALBUTEROL HYDROCHLORIDE 1.25 MG: 1.25 SOLUTION RESPIRATORY (INHALATION) at 07:45

## 2025-02-04 RX ADMIN — AZITHROMYCIN MONOHYDRATE 500 MG: 500 INJECTION, POWDER, LYOPHILIZED, FOR SOLUTION INTRAVENOUS at 14:10

## 2025-02-04 RX ADMIN — METHYLPREDNISOLONE SODIUM SUCCINATE 40 MG: 40 INJECTION, POWDER, FOR SOLUTION INTRAMUSCULAR; INTRAVENOUS at 05:36

## 2025-02-04 RX ADMIN — LOSARTAN POTASSIUM 25 MG: 25 TABLET, FILM COATED ORAL at 07:53

## 2025-02-04 RX ADMIN — IPRATROPIUM BROMIDE 0.5 MG: 0.5 SOLUTION RESPIRATORY (INHALATION) at 20:24

## 2025-02-04 RX ADMIN — ENOXAPARIN SODIUM 40 MG: 40 INJECTION SUBCUTANEOUS at 07:53

## 2025-02-04 RX ADMIN — LEVALBUTEROL HYDROCHLORIDE 1.25 MG: 1.25 SOLUTION RESPIRATORY (INHALATION) at 20:24

## 2025-02-04 RX ADMIN — LEVALBUTEROL HYDROCHLORIDE 1.25 MG: 1.25 SOLUTION RESPIRATORY (INHALATION) at 13:54

## 2025-02-04 RX ADMIN — LOSARTAN POTASSIUM 25 MG: 25 TABLET, FILM COATED ORAL at 21:59

## 2025-02-04 RX ADMIN — IPRATROPIUM BROMIDE 0.5 MG: 0.5 SOLUTION RESPIRATORY (INHALATION) at 07:45

## 2025-02-04 NOTE — PROGRESS NOTES
"Progress Note - Palliative Care   Name: Severiano Feliciano 89 y.o. male I MRN: [id number]  Unit/Bed#: E4 -01 I Date of Admission: 2/2/2025   Date of Service: 2/4/2025 I Hospital Day: 2    Assessment & Plan  Acute on chronic respiratory failure with hypoxia and hypercapnia (HCC)  -At baseline, on 3L supplemental oxygen with exertion  -Currently on HFNC 30L, 45% FiO2  -Continue to wean supplemental oxygen to maintain SpO2 > 88%  -Encourage OOB as tolerated, incentive spirometry  -Ambulatory O2 evaluation prior to discharge  Acute exacerbation of chronic obstructive pulmonary disease (COPD) (HCC)  very severe COPD, FEV1 0.54L 25% predicted in 2024  IV steroids  Nebulized bronchodilators  3L supplemental oxygen with exertion  Titrate off oxygen as able  Chronic diastolic (congestive) heart failure (HCC)  Wt Readings from Last 3 Encounters:   02/02/25 60.3 kg (132 lb 15 oz)   11/05/24 60.1 kg (132 lb 9.6 oz)   09/20/24 59.3 kg (130 lb 12.8 oz)   Weight stable, diurese per primary team           Palliative care encounter  Palliative diagnoses: COPD, CHF  Outpatient Palliative provider: Known to palliative medicine through previous admissions.  No outpatient follow-up has been established    Goals of care  Level 1 code status  Disease focused care with no limits in place.   Concerns introduced today include:  Patient refuses to discuss goals of care and CODE STATUS today.  Says \"I will not think about those things.\"  Does not wish to proceed with family meeting.  Will continue discussions regarding GOC as patient's clinical presentation evolves.    Social support provided for send Daljit:  3 adult children support patient  Patient prefers only focus on positive things, family supports this  Supportive listening provided  Normalized experience of patient/family  Provided anxiety containment  Provided anticipatory guidance  Living situation -patient lives with his wife and 2 of his children, son Renny and daughter " Rocio    Follow up  Palliative Care will continue to follow in hopes of building a trusting relationship with patient and having more effective GoC conversations in coming days.  Please reach out via [employer] Secure Chat if questions or concerns arise.    I have reviewed the patient's controlled substance dispensing history in the Prescription Drug Monitoring Program in compliance with the [facility] regulations before prescribing any controlled substances.  Last refills  No opioid or benzo refills in [place] over past year.    Decisional apparatus:  Patient lacks capacity based on chart review.  Patient's substitute decision maker would default to spouse Viridiana Zambrano by [place] Act 169.  Will need to investigate family dynamics at family meeting tomorrow as patient's spouse has a different last name than patient and children  Advance Directive/Living Will, POLST and POA Forms: None on file  ER contacts:  Name Relation Home Work Mobile   Viridiana Zambrano Spouse [phone]   Severiano,Julio Son   [phone]   Jim Patino Grandparent   [phone]   Renny Fernandez Son [phone]   Rocio Fernandez Daughter   [phone]     We appreciate the invitation to be involved in this patient's care.  We will continue to follow throughout this hospitalization.  Please do not hesitate to reach our on call provider through our clinic answering service at [phone] should you have acute symptom control concerns.    Jennifer P. Bloch, MSN, CRNP, ACHPN  Palliative and Supportive Care  Clinic/Answering Service: [phone]  You can find me on TableApp Secure Chat     24 hour history  Chart reviewed prior to visit  No events overnight    Had a family meeting scheduled for today with son Daljit and patient's spouse.  Patient's spouse Viridiana is present in person and Daljit is present via telephone.  Attempted to initiate a goals of care conversation with Daljit acting as patient's  at family's request.   Unfortunately, as Daljit begins to translate conversation regarding CODE STATUS patient says he does not wish to talk about these things.  He states he wants to get better and go home. Daljit and Viridiana support patient's this. Explained that patient can not be discharged on HFNC O2 which they all appear surprised by this information. Will continue to follow patient and try engage in further, more effective goals conversations.        Objective :  Temp:  [97.4 °F (36.3 °C)-98.5 °F (36.9 °C)] 97.4 °F (36.3 °C)  HR:  [59-96] 96  BP: (112-175)/() 112/74  Resp:  [18-20] 18  SpO2:  [91 %-97 %] 92 %  O2 Device: High flow nasal cannula  Nasal Cannula O2 Flow Rate (L/min):  [30 L/min] 30 L/min  FiO2 (%):  [50] 50    Physical Exam  Vitals and nursing note reviewed.   Constitutional:       General: He is awake.      Appearance: He is underweight. He is ill-appearing.   HENT:      Head: Normocephalic and atraumatic.   Eyes:      General: No scleral icterus.        Right eye: No discharge.         Left eye: No discharge.      Conjunctiva/sclera: Conjunctivae normal.   Cardiovascular:      Rate and Rhythm: Normal rate.   Pulmonary:      Effort: Pulmonary effort is normal. No respiratory distress.      Comments: On HFNC O2  Abdominal:      General: There is no distension.   Skin:     General: Skin is warm and dry.   Neurological:      Mental Status: He is alert.   Psychiatric:         Attention and Perception: He is inattentive.         Mood and Affect: Mood is anxious.          Lab Results: I have reviewed the following results:  Lab Results   Component Value Date/Time    SODIUM 147 02/04/2025 06:38 AM    SODIUM 142 04/19/2023 03:16 PM    K 5.2 02/04/2025 06:38 AM    K 5.5 (H) 04/19/2023 03:16 PM    BUN 33 (H) 02/04/2025 06:38 AM    BUN 25 04/19/2023 03:16 PM    CREATININE 0.83 02/04/2025 06:38 AM    CREATININE 0.97 04/19/2023 03:16 PM    GLUC 147 (H) 02/04/2025 06:38 AM    GLUC 153 (H) 04/19/2023 03:16 PM    CALCIUM 9.5  02/04/2025 06:38 AM    CALCIUM 8.7 04/19/2023 03:16 PM    AST 23 02/02/2025 04:56 AM    AST 24 04/19/2023 04:51 AM    ALT 13 02/02/2025 04:56 AM    ALT 21 04/19/2023 04:51 AM    ALB 4.1 02/02/2025 04:56 AM    ALB 3.4 (L) 04/19/2023 04:51 AM    TP 7.6 02/02/2025 04:56 AM    TP 6.0 (L) 04/19/2023 04:51 AM    EGFR 79 02/04/2025 06:38 AM    EGFR 77 04/19/2023 03:16 PM    EGFR 57 (L) 05/28/2020 08:00 PM     Lab Results   Component Value Date/Time    HGB 10.8 (L) 02/04/2025 06:38 AM    HGB 14.2 03/01/2014 04:55 AM    WBC 5.86 02/04/2025 06:38 AM    WBC 13.51 (H) 03/01/2014 04:55 AM    PLT 96 (L) 02/04/2025 06:38 AM     03/01/2014 04:55 AM    INR 1.04 02/02/2025 04:56 AM    INR 1.1 05/28/2020 08:00 PM    PTT 26 02/02/2025 04:56 AM     Lab Results   Component Value Date/Time    [id number] 0.258 (L) 04/25/2024 06:46 AM       Administrative Statements   I have spent a total time of 40 minutes in caring for this patient on the day of the visit/encounter including Patient and family education, Counseling / Coordination of care, Documenting in the medical record, Reviewing / ordering tests, medicine, procedures  , and Obtaining or reviewing history  .

## 2025-02-04 NOTE — ASSESSMENT & PLAN NOTE
-At baseline, on 3L supplemental oxygen with exertion  -Currently on HFNC 30L, 45% FiO2  -Continue to wean supplemental oxygen to maintain SpO2 > 88%  -Encourage OOB as tolerated, incentive spirometry  -Ambulatory O2 evaluation prior to discharge

## 2025-02-04 NOTE — PROGRESS NOTES
Progress Note - Pulmonology   Name: Severiano Feliciano 89 y.o. male I MRN: [id number]  Unit/Bed#: E4 -01 I Date of Admission: 2/2/2025   Date of Service: 2/4/2025 I Hospital Day: 2    Assessment & Plan  Acute exacerbation of chronic obstructive pulmonary disease (COPD) (HCC)  -Very severe COPD, FEV1 0.54L 25% predicted in 2024  -Follows with Dr. Hayward  -Baseline regimen includes all nebulized therapy. Not on chronic azithromycin due to hearing loss  -Presenting with acute exacerbation  -Continue IV steroids - can de-escalate to 40mg daily.  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  -Repeat procal negative  -Consider stopping ceftriaxone  Solitary pulmonary nodule on lung CT  -8 x 5 mm right middle lobe nodule, most recently 5 x 3 mm (3/163) and about 4 x 2 mm in January 2024.   -Outpt follow up. Given age and severe co-morbidities, this likely does not need to be followed.  Palliative care encounter  -Palliative care consulted - plan for meeting today 2/4    24 Hour Events : No acute events.   Subjective : Patient examined at bedside this morning. He was on HFNC 30L45% saturating well. He did require restraints overnight to keep his oxygen on as he kept trying to pull it off and  his O2 saturation would drop very quickly.     Objective :  Temp:  [98.1 °F (36.7 °C)-98.5 °F (36.9 °C)] 98.4 °F (36.9 °C)  HR:  [59-89] 69  BP: (129-175)/() 175/100  Resp:  [18-20] 18  SpO2:  [90 %-97 %] 96 %  O2 Device: High flow nasal cannula  Nasal Cannula O2 Flow Rate (L/min):  [30 L/min] 30 L/min  FiO2 (%):  [50] 50    Physical Exam  Vitals reviewed.   Constitutional:       General: He is not in acute distress.     Appearance: He is ill-appearing.      Comments: Thin, cachectic   HENT:      Head: Normocephalic and atraumatic.   Eyes:      Pupils: Pupils are equal, round, and reactive to light.   Cardiovascular:      Rate and Rhythm: Normal rate and regular rhythm.      Heart sounds: Normal heart sounds.   Pulmonary:      Effort: Pulmonary effort is normal.      Breath sounds: No wheezing or rhonchi.      Comments: Clear but diminished  Skin:     General: Skin is warm and dry.      Capillary Refill: Capillary refill takes less than 2 seconds.   Neurological:      Mental Status: He is alert.   Psychiatric:         Mood and Affect: Mood normal.         Behavior: Behavior is not agitated.           Lab Results: I have reviewed the following results:   .     02/04/25  0638   WBC 5.86   HGB 10.8*   HCT 37.6   PLT 96*   SODIUM 147   K 5.2   CL 99   CO2 >45*   BUN 33*   CREATININE 0.83   GLUC 147*     ABG:   No new results in last 24 hours.    Labs per my review reveal normal renal function, metabolic alkalosis, stable anemia     RP2 negative     ABG shows chronic compensated hypercapnia and hypoxia     Imaging Results Review: I personally reviewed the following image studies in PACS and associated radiology reports: CT chest. My interpretation of the radiology images/reports is: CT chest per my review shows mild to moderate emphysema, right lower lobe consolidation unchanged, right upper lobe opacity, 8 x 5 mm right middle lobe nodule, mucus in right mainstem bronchus.     PFT Results Reviewed: reviewed and  interpreted  PFTs 3/6/2024 per my review shows very severe obstruction FEV1 0.54 L, 25% predicted    Traci Gray MD  Pulmonary & Critical Care Medicine Fellow PGY-4  [facility]

## 2025-02-04 NOTE — PROGRESS NOTES
"Progress Note - Hospitalist   Name: Severiano Feliciano 89 y.o. male I MRN: [id number]  Unit/Bed#: E4 -01 I Date of Admission: 2/2/2025   Date of Service: 2/4/2025 I Hospital Day: 2    Assessment & Plan  Hypertension  Continue prior to admission losartan  Pulmonary hypertension (HCC)  Likely WHO group 3  Continue monitor volume status  Acute on chronic respiratory failure with hypoxia and hypercapnia (HCC)  Currently requiring high flow nasal cannula 35 L, 50% FiO2  Secondary to COPD exacerbation  Continue to wean off supplemental oxygen to maintain SpO2 greater than 88%  At baseline takes 3 L supplemental oxygen with exertion.    Acute exacerbation of chronic obstructive pulmonary disease (COPD) (HCC)  Patient has history of COPD FEV1 0.54 L 25% protected in 2024.  At baseline requires nebulization therapy, not on azithromycin due to hearing loss.    -Presented with acute exacerbation and altered mentation carbon dioxide retention.  -Continue with IV steroids, azithromycin  -Continue with nebulization.  -Palliative care consulted meeting tomorrow 2/4/25-patient did not wanted to discuss CODE STATUS or goals of care\" refused any further discussion said he will think about it by tomorrow  -Pulmonology on board appreciate recs  Chronic diastolic (congestive) heart failure (HCC)  Wt Readings from Last 3 Encounters:   02/02/25 60.3 kg (132 lb 15 oz)   11/05/24 60.1 kg (132 lb 9.6 oz)   09/20/24 59.3 kg (130 lb 12.8 oz)     Echo on 4/11/2023 reports normal ventricular cavity size with grade 1 diastolic dysfunction.  As per chart review patient takes torsemide 5 mg daily but as per wife and son there was some issues with allergies to furosemide and torsemide.  The family said they will find out the paperwork to know the exact medication with which patient is allergic and then can be restarted tomorrow    Abnormal CT of the chest  Right middle lobe nodule endorsed in January 2024, outpatient follow-up not needed as " per pulmonology  Solitary pulmonary nodule on lung CT  New right upper lobe opacity on imaging no leukocytosis procalcitonin negative  Pulmonology on board  Palliative care encounter  Patient was seen by palliative this morning, meeting with family scheduled for tomorrow 2//2025 to discuss CODE STATUS and further care.    VTE Pharmacologic Prophylaxis:    Enoxaparin    Mobility:   Basic Mobility Inpatient Raw Score: 15  JH-HLM Goal: 4: Move to chair/commode  JH-HLM Achieved: 2: Bed activities/Dependent transfer      Education and Discussions with Family / Patient: Updated  (wife and son) via phone.    Current Length of Stay: 2 day(s)  Current Patient Status: Inpatient     Discharge Plan: Anticipate discharge in 48-72 hrs to decided depending upon patient's performance with PT OT    Code Status: Level 1 - Full Code    Subjective   Patient seen examined at bedside.  Patient states he feels much better.  No acute concerns    Objective :  Temp:  [97.4 °F (36.3 °C)-98.6 °F (37 °C)] 98.6 °F (37 °C)  HR:  [59-96] 90  BP: ()/() 98/65  Resp:  [18-20] 18  SpO2:  [91 %-97 %] 94 %  O2 Device: High flow nasal cannula  Nasal Cannula O2 Flow Rate (L/min):  [30 L/min] 30 L/min  FiO2 (%):  [50] 50    Body mass index is 23.55 kg/m².     Input and Output Summary (last 24 hours):     Intake/Output Summary (Last 24 hours) at 2/4/2025 1708  Last data filed at 2/4/2025 0917  Gross per 24 hour   Intake 180 ml   Output --   Net 180 ml       Physical Exam  Vitals and nursing note reviewed.   Constitutional:       General: He is not in acute distress.     Appearance: He is well-developed.   HENT:      Head: Normocephalic and atraumatic.   Eyes:      Conjunctiva/sclera: Conjunctivae normal.   Cardiovascular:      Rate and Rhythm: Normal rate and regular rhythm.      Heart sounds: No murmur heard.  Pulmonary:      Effort: Pulmonary effort is normal. No respiratory distress.      Breath sounds: Wheezing present.    Abdominal:      General: Bowel sounds are normal.      Palpations: Abdomen is soft.      Tenderness: There is no abdominal tenderness.   Musculoskeletal:         General: No swelling.      Cervical back: Neck supple.   Skin:     General: Skin is warm and dry.      Capillary Refill: Capillary refill takes less than 2 seconds.   Neurological:      Mental Status: He is alert.      Comments: Alert and oriented to self and person   Psychiatric:         Mood and Affect: Mood normal.           Lines/Drains:  Lines/Drains/Airways       Active Status       Name Placement date Placement time Site Days    External Urinary Catheter 02/04/25  1206  -- less than 1                            Lab Results: I have reviewed the following results:   Results from last 7 days   Lab Units 02/04/25  0638 02/03/25  0501   WBC Thousand/uL 5.86 6.47   HEMOGLOBIN g/dL 10.8* 10.3*   HEMATOCRIT % 37.6 35.7*   PLATELETS Thousands/uL 96* 86*   SEGS PCT %  --  81*   LYMPHO PCT %  --  9*   MONO PCT %  --  9   EOS PCT %  --  0     Results from last 7 days   Lab Units 02/04/25  0638 02/03/25  0501 02/02/25  0456   SODIUM mmol/L 147   < > 146   POTASSIUM mmol/L 5.2   < > 4.2   CHLORIDE mmol/L 99   < > 93*   CO2 mmol/L >45*   < > >45*   BUN mg/dL 33*   < > 22   CREATININE mg/dL 0.83   < > 0.81   CALCIUM mg/dL 9.5   < > 10.1   ALBUMIN g/dL  --   --  4.1   TOTAL BILIRUBIN mg/dL  --   --  0.68   ALK PHOS U/L  --   --  54   ALT U/L  --   --  13   AST U/L  --   --  23   GLUCOSE RANDOM mg/dL 147*   < > 102    < > = values in this interval not displayed.     Results from last 7 days   Lab Units 02/02/25  0456   INR  1.04     Results from last 7 days   Lab Units 02/03/25  2208   POC GLUCOSE mg/dl 136         Results from last 7 days   Lab Units 02/03/25  0501 02/02/25  0456   LACTIC ACID mmol/L  --  1.2   PROCALCITONIN ng/ml 0.12 0.11       Recent Cultures (last 7 days):   Results from last 7 days   Lab Units 02/02/25  0458 02/02/25  0456   BLOOD CULTURE  No  Growth at 48 hrs. No Growth at 48 hrs.     XR chest portable  Result Date: 2/2/2025  Impression: Mild pulmonary venous congestion. Moderate opacity in the right lower lobe. See subsequent chest CT. Emphysema. Workstation performed: [id number]     CT chest abdomen pelvis w contrast  Result Date: 2/2/2025  Impression: 1. Patchy opacity posterolateral right upper lobe could represent atelectasis or pneumonia. Little change in chronic right lower lobe consolidation. 2. Background emphysema. Slowly enlarging 8 mm right middle lobe nodule. Possibility of neoplasm cannot be excluded. Based on current Fleischner Society 2017 Guidelines on incidental pulmonary nodule, follow-up non-contrast CT is recommended at 6 months from the initial examination and, if stable at that time, an additional follow-up is recommended for 18-24 months from the initial examination. 3. Thickening of the urinary bladder with hyperemia involving posterior lateral bladder diverticulum. Correlate with urinalysis regarding cystitis. 4. 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 No change in aneurysmal dilatation junction distal ascending aorta/arch. 5. Question thickening of the proximal esophagus. Consider esophagram. 6. Diffuse colonic diverticulosis without diverticulitis. Moderate to large amount of colonic stool suggesting constipation. 7. Cholelithiasis. Additional incidental findings as above. The study was marked in EPIC for follow-up. Workstation performed: [id number]     CT spine cervical without contrast  Result Date: 2/2/2025  Impression: No cervical spine fracture or traumatic malalignment. Workstation performed: [id number]     CT head without contrast  Result Date: 2/2/2025  Impression: No acute intracranial abnormality.  Chronic microangiopathic changes. 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 Workstation performed:  [id number]         Last 24 Hours Medication List:     Current Facility-Administered Medications:     acetaminophen (TYLENOL) tablet 975 mg, Q8H PRN    aluminum-magnesium hydroxide-simethicone (MAALOX) oral suspension 30 mL, Q4H PRN    azithromycin (ZITHROMAX) 500 mg in sodium chloride 0.9 % 250 mL IVPB, Q24H, Last Rate: 500 mg (02/04/25 1410)    ceftriaxone (ROCEPHIN) 1 g/50 mL in dextrose IVPB, Q24H    divalproex sodium (DEPAKOTE SPRINKLE) capsule 125 mg, HS PRN **OR** valproate (DEPACON) 125 mg in sodium chloride 0.9 % 50 mL IVPB, HS PRN, Last Rate: 125 mg (02/02/25 2021)    enoxaparin (LOVENOX) subcutaneous injection 40 mg, Daily    guaiFENesin (ROBITUSSIN) oral liquid 200 mg, Q4H PRN    ipratropium (ATROVENT) 0.02 % inhalation solution 0.5 mg, TID    levalbuterol (XOPENEX) inhalation solution 1.25 mg, TID **AND** [DISCONTINUED] sodium chloride 0.9 % inhalation solution 3 mL, TID    losartan (COZAAR) tablet 25 mg, BID    melatonin tablet 3 mg, HS PRN    [START ON 2/5/2025] methylPREDNISolone sodium succinate (Solu-MEDROL) injection 40 mg, Daily    polyethylene glycol (MIRALAX) packet 17 g, Daily PRN    Administrative Statements   Today, Patient Was Seen By: Christina Price MD  I have spent a total time of >35 minutes in caring for this patient on the day of the visit/encounter including Diagnostic results, Prognosis, Risks and benefits of tx options, Counseling / Coordination of care, Documenting in the medical record, and Communicating with other healthcare professionals .    **Please Note: This note may have been constructed using a voice recognition system.**

## 2025-02-04 NOTE — CASE MANAGEMENT
Case Management Assessment & Discharge Planning Note    Patient name Severiano Feliciano  Location [facility] /E4 -* MRN [id number]  : 1935 Date 2025       Current Admission Date: 2025  Current Admission Diagnosis:Acute exacerbation of chronic obstructive pulmonary disease (COPD) (HCC)   Patient Active Problem List    Diagnosis Date Noted Date Diagnosed    Palliative care encounter 2025     Abnormal CT of the chest 2025     Solitary pulmonary nodule on lung CT 2025     JAY treated with BiPAP 2024     Elevated troponin 2024     Abnormal CT of the abdomen 2024     Acute exacerbation of chronic obstructive pulmonary disease (COPD) (HCC) 2024     Chronic bilateral low back pain without sciatica 2024     Pulmonary hypertension (HCC) 2024     Aneurysm, carotid artery, internal 2024     Unspecified severe protein-calorie malnutrition (HCC) 01/15/2024     Macrocytosis 2023     Constipation 2023     Prediabetes 10/25/2023     Bilateral hearing loss 10/25/2023     PAC (premature atrial contraction) 2023     Vitamin B12 deficiency 2023     Vitamin D deficiency 2023     Weight loss 2022     Rectal bleeding 2021     Hyperlipidemia 2021     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     Dysphagia 2020     TIA (transient ischemic attack) 2020     S/P hernia repair 2019     Acquired renal cyst of left kidney 2019     Chronic diastolic (congestive) heart failure (HCC) 2019     Thrombocytopenia (HCC) 2018     Varicose veins of both lower extremities with pain 2018     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HCC) 2018     Acute on chronic respiratory failure with hypoxia and hypercapnia (HCC) 2018     Descending aortic aneurysm (HCC) 2018     History of DVT (deep vein thrombosis) 2018     Hypertension  Statement Selected 02/01/2018     COPD, severe (HCC) 02/01/2018     Benign essential hypertension 08/02/2017       LOS (days): 2  Geometric Mean LOS (GMLOS) (days):   Days to GMLOS:     OBJECTIVE:    Risk of Unplanned Readmission Score: 27.56         Current admission status: Inpatient       Preferred Pharmacy:   [facility], [facility] [address]  [address] [facility] [address]  Phone: [phone] Fax: [phone]    [facility] [place] [place] - [address]  [address] [place] [address]  Phone: [phone] Fax: [phone]    [facility] - [place], [place] - [address]  [address] [place] [address]  Phone: [phone] Fax: [phone]    Primary Care Provider: Kirby Casanova MD    Primary Insurance: [employer]ICARE [facility]  Secondary Insurance: [employer] [facility]    ASSESSMENT:  Active Health Care Proxies       Viridiana Zambrano Health Care Representative - Spouse   Primary Phone: [phone] (Mobile)  Home Phone: [phone]                 Advance Directives  Does patient have a Health Care POA?: No  Was patient offered paperwork?: No (pt wants to be a full code.)  Does patient currently have a Health Care decision maker?: Yes, please see Health Care Proxy section  Does patient have Advance Directives?: No  Was patient offered paperwork?: No (pt wants to be full code)  Primary Contact: Viridiana Zambrano (Spouse)  [phone] (Mobile)         Readmission Root Cause  30 Day Readmission: No    Patient Information  Admitted from:: Home  Mental Status: Alert, Confused  During Assessment patient was accompanied by: Spouse  Assessment information provided by:: Spouse, Son  Primary Caregiver: Family  Caregiver's Name:: Viridiana Zambrano (Spouse)  [phone] (Mobile)  Caregiver's Relationship to Patient:: Family Member  Caregiver's Telephone Number:: Viridiana Zambrano (Spouse)  [phone] (Mobile)  Support Systems:  Spouse/significant other  County of Residence: [place]  What city do you live in?: [place]  Home entry access options. Select all that apply.: Stairs  Number of steps to enter home.: 5  Do the steps have railings?: Yes  Type of Current Residence: 2 story home  Upon entering residence, is there a bedroom on the main floor (no further steps)?: No  A bedroom is located on the following floor levels of residence (select all that apply):: 2nd Floor  Upon entering residence, is there a bathroom on the main floor (no further steps)?: No  Indicate which floors of current residence have a bathroom (select all the apply):: 2nd Floor  Number of steps to 2nd floor from main floor: One Flight (15 total)  Living Arrangements: Lives w/ Spouse/significant other  Is patient a ?: No    Activities of Daily Living Prior to Admission  Functional Status: Assistance  Completes ADLs independently?: No  Level of ADL dependence: Assistance  Ambulates independently?: No  Level of ambulatory dependence: Assistance  Does patient use assisted devices?: Yes  Assisted Devices (DME) used: Walker, Straight Cane, Wheelchair, Portable Oxygen tanks, Portable Oxygen concentrator, Shower Chair  DME Company Name (respiratory supplies): [employer]  O2 Rate(s): 3  Does patient currently own DME?: Yes  What DME does the patient currently own?: Home Oxygen concentrator, Portable Oxygen concentrator, Shower Chair, Straight Cane, Walker, Wheelchair  Does patient have a history of Outpatient Therapy (PT/OT)?: Yes  Does the patient have a history of Short-Term Rehab?: No  Does patient have a history of HHC?: Yes  Does patient currently have HHC?: No    Current Home Health Care  [facility] Health Agency [facility]    Patient Information Continued  Income Source: Pension/FDC  Does patient have prescription coverage?: Yes  Does patient receive dialysis treatments?: No  Does patient have a history of substance abuse?: No  Does patient have a  history of Mental Health Diagnosis?: No         Means of Transportation  [facility]          DISCHARGE DETAILS:    Discharge planning discussed with:: wife, Viridiana Zambrano (Spouse) and sonDaljit  Freedom of Choice: Yes  Comments - Freedom of Choice: Home vs home with VNA  CM contacted family/caregiver?: Yes  Were Treatment Team discharge recommendations reviewed with patient/caregiver?: Yes  Did patient/caregiver verbalize understanding of patient care needs?: Yes  Were patient/caregiver advised of the risks associated with not following Treatment Team discharge recommendations?: Yes    Contacts  Patient Contacts: Julio Severiano  Son  [phone]  Relationship to Patient:: Family  Contact Method: In Person, Phone  Phone Number: Viridiana Zambrano (Spouse)  [phone] (Mobile)  Reason/Outcome: Continuity of Care, Emergency Contact, Discharge Planning    Requested Home Health Care         Is the patient interested in HHC at discharge?: Yes  Home Health Discipline requested:: Nursing, Occupational Therapy, Physical Therapy  Home Health Agency Name:: [facility]  HHA External Referral Reason (only applicable if external HHA name selected): Patient has established relationship with provider  Home Health Follow-Up Provider:: PCP  Home Health Services Needed:: COPD Management, Evaluate Functional Status and Safety, Gait/ADL Training, Oxygen Via Nasal Cannula, Strengthening/Theraputic Exercises to Improve Function  Oxygen LPM Ordered (if applicable based on home health services needed):: 3 LPM  Homebound Criteria Met:: Requires the Assistance of Another Person for Safe Ambulation or to Leave the Home, Uses an Assist Device (i.e. cane, walker, etc)  Supporting Clincal Findings:: Requires Oxygen, Limited Endurance, Fatigues Easliy in Short Distances              Would you like to participate in our [facility] service program?  : No - Declined    Treatment Team Recommendation:  Home with Home Health Care  Discharge Destination Plan:: Home with Home Health Care  Transport at Discharge : Family                                      Additional Comments: Pt and family had meeting with palliative care and the pt wants to be full code and family supportive of this decision.  Palliative to follow as needed.

## 2025-02-04 NOTE — ASSESSMENT & PLAN NOTE
"Patient has history of COPD FEV1 0.54 L 25% protected in 2024.  At baseline requires nebulization therapy, not on azithromycin due to hearing loss.    -Presented with acute exacerbation and altered mentation carbon dioxide retention.  -Continue with IV steroids, azithromycin  -Continue with nebulization.  -Palliative care consulted meeting tomorrow 2/4/25-patient did not wanted to discuss CODE STATUS or goals of care\" refused any further discussion said he will think about it by tomorrow  -Pulmonology on board appreciate recs  "

## 2025-02-04 NOTE — ASSESSMENT & PLAN NOTE
-Very severe COPD, FEV1 0.54L 25% predicted in 2024  -Follows with Dr. Hayward  -Baseline regimen includes all nebulized therapy. Not on chronic azithromycin due to hearing loss  -Presenting with acute exacerbation  -Continue IV steroids - can de-escalate to 40mg daily.  -Continue azithromycin (day 2/3)  -Continue nebulized bronchodilators

## 2025-02-05 ENCOUNTER — TELEPHONE (OUTPATIENT)
Dept: PULMONOLOGY | Facility: CLINIC | Age: 89
End: 2025-02-05

## 2025-02-05 PROBLEM — E87.0 HYPERNATREMIA: Status: ACTIVE | Noted: 2025-02-05

## 2025-02-05 LAB
ARTERIAL PATENCY WRIST A: YES
ARTERIAL PATENCY WRIST A: YES
BASE EXCESS BLDA CALC-SCNC: 14.4 MMOL/L
BASE EXCESS BLDA CALC-SCNC: 15.9 MMOL/L
BUN SERPL-MCNC: 31 MG/DL (ref 5–25)
CALCIUM SERPL-MCNC: 9.1 MG/DL (ref 8.4–10.2)
CHLORIDE SERPL-SCNC: 101 MMOL/L (ref 96–108)
CO2 SERPL-SCNC: >45 MMOL/L (ref 21–32)
CREAT SERPL-MCNC: 0.75 MG/DL (ref 0.6–1.3)
ERYTHROCYTE [DISTWIDTH] IN BLOOD BY AUTOMATED COUNT: 13.1 % (ref 11.6–15.1)
GFR SERPL CREATININE-BSD FRML MDRD: 82 ML/MIN/1.73SQ M
GLUCOSE SERPL-MCNC: 95 MG/DL (ref 65–140)
HCO3 BLDA-SCNC: 44.7 MMOL/L (ref 22–28)
HCO3 BLDA-SCNC: 44.8 MMOL/L (ref 22–28)
HCT VFR BLD AUTO: 38.1 % (ref 36.5–49.3)
HFNC FLOW LPM: 35
HGB BLD-MCNC: 10.8 G/DL (ref 12–17)
IPAP: 18
MCH RBC QN AUTO: 31.2 PG (ref 26.8–34.3)
MCHC RBC AUTO-ENTMCNC: 28.3 G/DL (ref 31.4–37.4)
MCV RBC AUTO: 110 FL (ref 82–98)
NON VENT HFNC FIO2: 60
NON VENT TYPE HFNC: ABNORMAL
NON VENT- BIPAP: ABNORMAL
O2 CT BLDA-SCNC: 14.6 ML/DL (ref 16–23)
O2 CT BLDA-SCNC: 15.3 ML/DL (ref 16–23)
OXYHGB MFR BLDA: 92.5 % (ref 94–97)
OXYHGB MFR BLDA: 93.5 % (ref 94–97)
PCO2 BLDA: 82.3 MM HG (ref 36–44)
PCO2 BLDA: 96.7 MM HG (ref 36–44)
PEEP MAX SETTING VENT: 6 CM[H2O]
PH BLDA: 7.28 [PH] (ref 7.35–7.45)
PH BLDA: 7.35 [PH] (ref 7.35–7.45)
PLATELET # BLD AUTO: 95 THOUSANDS/UL (ref 149–390)
PMV BLD AUTO: 9.7 FL (ref 8.9–12.7)
PO2 BLDA: 68.4 MM HG (ref 75–129)
PO2 BLDA: 78.7 MM HG (ref 75–129)
POTASSIUM SERPL-SCNC: 3.9 MMOL/L (ref 3.5–5.3)
RBC # BLD AUTO: 3.46 MILLION/UL (ref 3.88–5.62)
SODIUM SERPL-SCNC: 150 MMOL/L (ref 135–147)
SPECIMEN SOURCE: ABNORMAL
SPECIMEN SOURCE: ABNORMAL
VENT BIPAP FIO2: 50 %
WBC # BLD AUTO: 6.84 THOUSAND/UL (ref 4.31–10.16)

## 2025-02-05 PROCEDURE — 94762 N-INVAS EAR/PLS OXIMTRY CONT: CPT

## 2025-02-05 PROCEDURE — 99232 SBSQ HOSP IP/OBS MODERATE 35: CPT

## 2025-02-05 PROCEDURE — 94002 VENT MGMT INPAT INIT DAY: CPT

## 2025-02-05 PROCEDURE — 85027 COMPLETE CBC AUTOMATED: CPT

## 2025-02-05 PROCEDURE — 80048 BASIC METABOLIC PNL TOTAL CA: CPT

## 2025-02-05 PROCEDURE — 94760 N-INVAS EAR/PLS OXIMETRY 1: CPT

## 2025-02-05 PROCEDURE — 82805 BLOOD GASES W/O2 SATURATION: CPT | Performed by: NURSE PRACTITIONER

## 2025-02-05 PROCEDURE — 36600 WITHDRAWAL OF ARTERIAL BLOOD: CPT

## 2025-02-05 PROCEDURE — 94640 AIRWAY INHALATION TREATMENT: CPT

## 2025-02-05 PROCEDURE — 82805 BLOOD GASES W/O2 SATURATION: CPT | Performed by: INTERNAL MEDICINE

## 2025-02-05 PROCEDURE — 99223 1ST HOSP IP/OBS HIGH 75: CPT | Performed by: INTERNAL MEDICINE

## 2025-02-05 PROCEDURE — 99232 SBSQ HOSP IP/OBS MODERATE 35: CPT | Performed by: INTERNAL MEDICINE

## 2025-02-05 RX ORDER — PREDNISONE 20 MG/1
40 TABLET ORAL DAILY
Status: COMPLETED | OUTPATIENT
Start: 2025-02-06 | End: 2025-02-08

## 2025-02-05 RX ORDER — DEXTROSE MONOHYDRATE 50 MG/ML
50 INJECTION, SOLUTION INTRAVENOUS CONTINUOUS
Status: DISPENSED | OUTPATIENT
Start: 2025-02-05 | End: 2025-02-06

## 2025-02-05 RX ORDER — LORAZEPAM 2 MG/ML
0.5 INJECTION INTRAMUSCULAR ONCE
Status: COMPLETED | OUTPATIENT
Start: 2025-02-05 | End: 2025-02-05

## 2025-02-05 RX ADMIN — METHYLPREDNISOLONE SODIUM SUCCINATE 40 MG: 40 INJECTION, POWDER, FOR SOLUTION INTRAMUSCULAR; INTRAVENOUS at 08:23

## 2025-02-05 RX ADMIN — IPRATROPIUM BROMIDE 0.5 MG: 0.5 SOLUTION RESPIRATORY (INHALATION) at 07:41

## 2025-02-05 RX ADMIN — DEXTROSE 50 ML/HR: 5 SOLUTION INTRAVENOUS at 14:14

## 2025-02-05 RX ADMIN — LEVALBUTEROL HYDROCHLORIDE 1.25 MG: 1.25 SOLUTION RESPIRATORY (INHALATION) at 19:59

## 2025-02-05 RX ADMIN — ENOXAPARIN SODIUM 40 MG: 40 INJECTION SUBCUTANEOUS at 08:24

## 2025-02-05 RX ADMIN — LORAZEPAM 0.5 MG: 2 INJECTION INTRAMUSCULAR; INTRAVENOUS at 02:35

## 2025-02-05 RX ADMIN — LEVALBUTEROL HYDROCHLORIDE 1.25 MG: 1.25 SOLUTION RESPIRATORY (INHALATION) at 07:41

## 2025-02-05 RX ADMIN — IPRATROPIUM BROMIDE 0.5 MG: 0.5 SOLUTION RESPIRATORY (INHALATION) at 13:19

## 2025-02-05 RX ADMIN — IPRATROPIUM BROMIDE 0.5 MG: 0.5 SOLUTION RESPIRATORY (INHALATION) at 19:59

## 2025-02-05 RX ADMIN — LOSARTAN POTASSIUM 25 MG: 25 TABLET, FILM COATED ORAL at 08:24

## 2025-02-05 RX ADMIN — AZITHROMYCIN MONOHYDRATE 500 MG: 500 INJECTION, POWDER, LYOPHILIZED, FOR SOLUTION INTRAVENOUS at 13:57

## 2025-02-05 RX ADMIN — LEVALBUTEROL HYDROCHLORIDE 1.25 MG: 1.25 SOLUTION RESPIRATORY (INHALATION) at 13:19

## 2025-02-05 NOTE — ASSESSMENT & PLAN NOTE
Currently requiring high flow nasal cannula 35 L, 50% FiO2  Secondary to COPD exacerbation  Continue to wean off supplemental oxygen to maintain SpO2 greater than 88%  At baseline takes 3 L supplemental oxygen with exertion.     Never smoker

## 2025-02-05 NOTE — TELEPHONE ENCOUNTER
Pt has been scheduled for  4w hfu in [place] office per Traci Gray MD.      3/19/25  1:00 pm  [address] [facility] [address] [place]

## 2025-02-05 NOTE — ASSESSMENT & PLAN NOTE
-Very severe COPD, FEV1 0.54L 25% predicted in 2024  -Follows with Dr. Hayward  -Baseline regimen includes all nebulized therapy. Not on chronic azithromycin due to hearing loss  -Presenting with acute exacerbation  -Continue IV steroids with methylpred 40mg daily today - transition to prednisone 40mg daily tomorrow. Decrease by 10mg every 3 days   -Continue azithromycin (day 3/3)  -Continue nebulized bronchodilators  -Patient will need outpatient pulmonary follow up - message sent to arrange for visit

## 2025-02-05 NOTE — CONSULTS
Consultation - Nephrology   Name: Severiano Feliciano 89 y.o. male I MRN: [id number]  Unit/Bed#: E4 -01 I Date of Admission: 2/2/2025   Date of Service: 2/5/2025 I Hospital Day: 3   Inpatient consult to Nephrology  Consult performed by: Leonel Blackwell DO  Consult ordered by: Christina Price MD        Physician Requesting Evaluation: Christina Price MD   Reason for Evaluation / Principal Problem: Hypernatremia    Assessment & Plan  Hypertension  Blood pressures currently acceptable  Pressure lowering medications include losartan 25 mg 2 times daily  Acute on chronic respiratory failure with hypoxia and hypercapnia (HCC)  Currently stable on high flow nasal cannula  Acute exacerbation of chronic obstructive pulmonary disease (COPD) (HCC)  Being treated with prednisone 40 mg daily  Chronic diastolic (congestive) heart failure (HCC)  Not on any diuretic therapy, CT of the chest abdomen pelvis without contrast does not show any overt pulmonary edema  JAY treated with BiPAP  Continue current treatment  Abnormal CT of the chest  Will need outpatient follow-up with pulmonary or primary  Hypernatremia  No restrictions would have him drink free water  He has about a 2.1 L free water deficit if he is able to drink we will allow for him to do this  We will give him 500 cc of D5W if this worsens his shortness of breath please stop.  Will run at 50 cc an hour  Repeat BMP tomorrow  Please contact the SecureChat role for the Nephrology service with any questions/concerns.    History of Present Illness   Severiano Feliciano is a 89 y.o. male who was admitted to [facility] after presenting with shortness of breath. A renal consultation is requested today for assistance in the management of hyponatremia.    89-year-old male with advanced COPD, diastolic heart failure, presents with chronic hypoxic and hypercapnic respiratory failure he had a cough shortness of breath and altered mental status he required BiPAP and is being treated for  COPD exacerbation now has had decreased p.o. intake history and conversation with patient's son over the phone    Review of Systems   Constitutional:  Negative for chills and fever.   HENT:  Negative for ear pain and sore throat.    Eyes:  Negative for pain and visual disturbance.   Respiratory:  Negative for cough and shortness of breath.    Cardiovascular:  Negative for chest pain and palpitations.   Gastrointestinal:  Negative for abdominal pain and vomiting.   Genitourinary:  Negative for dysuria and hematuria.   Musculoskeletal:  Negative for arthralgias and back pain.   Skin:  Negative for color change and rash.   Neurological:  Negative for seizures and syncope.   All other systems reviewed and are negative.    I have reviewed the patient's PMH, PSH, Social History, Family History, Meds, and Allergies  Historical Information   Past Medical History:   Diagnosis Date    Acute metabolic encephalopathy 06/13/2020    Age-related cataract of right eye 04/04/2023    patient is medically cleared and presents with low risk of complication with this upcoming R cataract surgery.    Anemia     Aneurysm, aorta, thoracic (HCC)     Appendicolith     Ascending aortic aneurysm (HCC)     3.7    Asthma     BPH (benign prostatic hyperplasia)     CAD (coronary artery disease)     noted on CT scan    CHF (congestive heart failure) (HCC)     COPD (chronic obstructive pulmonary disease) (HCC)     Delirium 04/25/2024    Descending thoracic aortic aneurysm (HCC)     Diabetes mellitus (HCC)     Diverticulosis     Former tobacco use     GERD (gastroesophageal reflux disease)     History of DVT (deep vein thrombosis)     Left leg    History of transfusion     Hypertension     Hypoxemia 04/30/2023    Inguinal hernia     right    Nephrolithiasis     Oxygen dependent     2LNC    Oxygen dependent     Pneumonia     Pre-diabetes     Prostate calculus     PVD (peripheral vascular disease) (HCC)     Recurrent right inguinal hernia w incarcertion  2023    SIRS (systemic inflammatory response syndrome) (HCC) 2024    Solitary pulmonary nodule on lung CT 2025    8 x 5 mm right middle lobe nodule, most recently 5 x 3 mm (3163) and about 4 x 2 mm in 2024.       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)     Varicose vein of leg     b/l     Past Surgical History:   Procedure Laterality Date    CARDIAC SURGERY      ESOPHAGOGASTRODUODENOSCOPY      HERNIA REPAIR Right 2019    Procedure: REPAIR HERNIA INGUINAL WITH MESH;  Surgeon: David Lowry MD;  Location: [facility];  Service: General    HERNIA REPAIR Right 2023    Procedure: REPAIR RECURRENT INCARERATED HERNIA INGUINAL OPEN, RIGHT ORCHIECTOMY;  Surgeon: Mason Bertrand MD;  Location: [facility];  Service: General    INGUINAL HERNIA REPAIR Bilateral     IR TEVAR  2018    MS EVASC RPR DTA COVERAGE ART ORIGIN 1ST ENDOPROSTH N/A 2018    Procedure: TEVAR - endovascular thoracic aortic aneurysm repair;  Surgeon: Gladis Ortega MD;  Location: [facility];  Service: Vascular    THORACIC AORTIC ANEURYSM REPAIR  2018    VARICOSE VEIN SURGERY Bilateral     vein stripping     Social History     Tobacco Use    Smoking status: Former     Current packs/day: 0.00     Average packs/day: 1 pack/day for 35.0 years (35.0 ttl pk-yrs)     Types: Cigarettes     Start date:      Quit date:      Years since quittin.1    Smokeless tobacco: Never   Vaping Use    Vaping status: Never Used   Substance and Sexual Activity    Alcohol use: Never    Drug use: No    Sexual activity: Not Currently     Partners: Female     E-Cigarette/Vaping    E-Cigarette Use Never User      E-Cigarette/Vaping Substances    Nicotine No     THC No     CBD No     Flavoring No     Other No     Unknown No      Family History    Problem Relation Age of Onset    Tuberculosis Mother     No Known Problems Father     Cancer Sister     Diabetes Family     Hypertension Family      Social History     Tobacco Use    Smoking status: Former     Current packs/day: 0.00     Average packs/day: 1 pack/day for 35.0 years (35.0 ttl pk-yrs)     Types: Cigarettes     Start date:      Quit date:      Years since quittin.1    Smokeless tobacco: Never   Vaping Use    Vaping status: Never Used   Substance and Sexual Activity    Alcohol use: Never    Drug use: No    Sexual activity: Not Currently     Partners: Female       Current Facility-Administered Medications:     acetaminophen (TYLENOL) tablet 975 mg, Q8H PRN    aluminum-magnesium hydroxide-simethicone (MAALOX) oral suspension 30 mL, Q4H PRN    azithromycin (ZITHROMAX) 500 mg in sodium chloride 0.9 % 250 mL IVPB, Q24H, Last Rate: 500 mg (25 1357)    dextrose 5 % infusion, Continuous    divalproex sodium (DEPAKOTE SPRINKLE) capsule 125 mg, HS PRN **OR** valproate (DEPACON) 125 mg in sodium chloride 0.9 % 50 mL IVPB, HS PRN, Last Rate: 125 mg (25)    enoxaparin (LOVENOX) subcutaneous injection 40 mg, Daily    guaiFENesin (ROBITUSSIN) oral liquid 200 mg, Q4H PRN    ipratropium (ATROVENT) 0.02 % inhalation solution 0.5 mg, TID    levalbuterol (XOPENEX) inhalation solution 1.25 mg, TID **AND** [DISCONTINUED] sodium chloride 0.9 % inhalation solution 3 mL, TID    losartan (COZAAR) tablet 25 mg, BID    melatonin tablet 3 mg, HS PRN    polyethylene glycol (MIRALAX) packet 17 g, Daily PRN    [START ON 2025] predniSONE tablet 40 mg, Daily  Prior to Admission Medications   Prescriptions Last Dose Informant Patient Reported? Taking?   Budeson-Glycopyrrol-Formoterol 160-9-4.8 MCG/ACT AERO   No No   Sig: Inhale 2 puffs 2 (two) times a day Rinse mouth after use.   Senna Plus 8.6-50 MG per tablet   No No   Sig: TAKE 1 TABLET BY MOUTH DAILYTOME 1 TABLET BY MOUTH DAILY   acetaminophen  (TYLENOL) 650 mg CR tablet   No No   Sig: TAKE 1 TABLET (650 MG TOTAL) BY MOUTH EVERY 8 (EIGHT) HOURS AS NEEDED FOR MILD PAINTOME 1 TABLET (650 MG TOTAL) BY MOUTH CADA 8 (EIGHT) HOURS AS NEEDED PARA MILD PAIN   albuterol (PROVENTIL HFA,VENTOLIN HFA) 90 mcg/act inhaler   No No   Sig: INHALE 2 PUFFS EVERY 6 (SIX) HOURS AS NEEDED FOR WHEEZINGINHALE 2 PUFFS CADA 6 (SIX) HOURS AS NEEDED PARA WHEEZING   azithromycin (ZITHROMAX) 250 mg tablet   No No   Sig: Take 1 tablet (250 mg total) by mouth every 24 hours   brimonidine tartrate 0.2 % ophthalmic solution   Yes No   Sig: ADMINISTER 1 DROP INTO THE LEFT EYE 2 (TWO) TIMES A DAY.   budesonide (Pulmicort) 0.5 mg/2 mL nebulizer solution   No No   Sig: Take 2 mL (0.5 mg total) by nebulization 2 (two) times a day Rinse mouth after use.   ciprofloxacin (CILOXAN) 0.3 % ophthalmic solution   No No   Si DROPS LEFT EAR EVERY NIGHT AT BEDTIME FOR 10 DAYS4 DROPS LEFT EAR CADA ANTES DE DORMIR EN LA NOCHE PARA 10 DAYS   ergocalciferol (VITAMIN D2) 50,000 units   No No   Sig: TAKE ONE CAPSULE BY MOUTH ONCE A WEEKTOMAR NAVA CAPSULA POR VIA ORAL NAVA VEZ A LA SEMANA   ipratropium-albuterol (DUO-NEB) 0.5-2.5 mg/3 mL nebulizer solution   No No   Sig: Take 3 mL by nebulization 2 (two) times a day   ketoconazole (NIZORAL) 2 % shampoo   No No   Sig: Apply 1 Application topically 2 (two) times a week   losartan (COZAAR) 25 mg tablet   No No   Sig: Take 1 tablet (25 mg total) by mouth 2 (two) times a day   mometasone (ELOCON) 0.1 % lotion   No No   Sig: Apply topically daily   multivitamin (THERAGRAN) TABS   No No   Sig: Take 1 tablet by mouth daily   pantoprazole (PROTONIX) 40 mg tablet   No No   Sig: TAKE 1 TABLET (40 MG TOTAL) BY MOUTH DAILY   predniSONE 5 mg tablet   No No   Sig: Take 1 tablet (5 mg total) by mouth daily   torsemide (DEMADEX) 5 MG tablet   No No   Sig: Take 1 tablet (5 mg total) by mouth daily      Facility-Administered Medications: None     Penicillins, Lisinopril,  Lasix [furosemide], and Zyprexa [olanzapine]    Objective :  Temp:  [98.4 °F (36.9 °C)-99.1 °F (37.3 °C)] 98.5 °F (36.9 °C)  HR:  [69-96] 96  BP: ()/(51-80) 138/80  Resp:  [18-22] 20  SpO2:  [92 %-97 %] 97 %  O2 Device: High flow nasal cannula  Nasal Cannula O2 Flow Rate (L/min):  [30 L/min-35 L/min] 35 L/min  FiO2 (%):  [40-60] 60    Current Weight: Weight - Scale: 60.3 kg (132 lb 15 oz)  First Weight: Weight - Scale: 60.3 kg (132 lb 15 oz)  I/O         02/03 0701  02/04 0700 02/04 0701  02/05 0700 02/05 0701  02/06 0700    P.O. 240 300     IV Piggyback       Total Intake(mL/kg) 240 (4) 300 (5)     Net +240 +300            Unmeasured Urine Occurrence   1 x          Physical Exam  Vitals and nursing note reviewed.   Constitutional:       General: He is not in acute distress.     Appearance: He is well-developed.   HENT:      Head: Normocephalic and atraumatic.   Eyes:      Conjunctiva/sclera: Conjunctivae normal.   Cardiovascular:      Rate and Rhythm: Normal rate and regular rhythm.      Heart sounds: No murmur heard.  Pulmonary:      Effort: Pulmonary effort is normal. No respiratory distress.      Breath sounds: Normal breath sounds.   Abdominal:      Palpations: Abdomen is soft.      Tenderness: There is no abdominal tenderness.   Musculoskeletal:         General: No swelling.      Cervical back: Neck supple.   Skin:     General: Skin is warm and dry.      Capillary Refill: Capillary refill takes less than 2 seconds.   Neurological:      Mental Status: He is alert.   Psychiatric:         Mood and Affect: Mood normal.       Medications:    Current Facility-Administered Medications:     acetaminophen (TYLENOL) tablet 975 mg, 975 mg, Oral, Q8H PRN, Calin Chacon PA-C    aluminum-magnesium hydroxide-simethicone (MAALOX) oral suspension 30 mL, 30 mL, Oral, Q4H PRN, Calin Chacon PA-C    azithromycin (ZITHROMAX) 500 mg in sodium chloride 0.9 % 250 mL IVPB, 500 mg, Intravenous, Q24H, Traci Gray MD, Last Rate:  250 mL/hr at 02/04/25 1410, 500 mg at 02/04/25 1410    divalproex sodium (DEPAKOTE SPRINKLE) capsule 125 mg, 125 mg, Oral, HS PRN, 125 mg at 02/03/25 2248 **OR** valproate (DEPACON) 125 mg in sodium chloride 0.9 % 50 mL IVPB, 125 mg, Intravenous, HS PRN, Calin Chacon PA-C, Last Rate: 50 mL/hr at 02/02/25 2021, 125 mg at 02/02/25 2021    enoxaparin (LOVENOX) subcutaneous injection 40 mg, 40 mg, Subcutaneous, Daily, Phan Lutz MD, 40 mg at 02/05/25 0824    guaiFENesin (ROBITUSSIN) oral liquid 200 mg, 200 mg, Oral, Q4H PRN, Calin Chacon PA-C    ipratropium (ATROVENT) 0.02 % inhalation solution 0.5 mg, 0.5 mg, Nebulization, TID, Gabi Christiansen DO, 0.5 mg at 02/05/25 1319    levalbuterol (XOPENEX) inhalation solution 1.25 mg, 1.25 mg, Nebulization, TID, 1.25 mg at 02/05/25 1319 **AND** [DISCONTINUED] sodium chloride 0.9 % inhalation solution 3 mL, 3 mL, Nebulization, TID, Phan Lutz MD, 3 mL at 02/02/25 2014    losartan (COZAAR) tablet 25 mg, 25 mg, Oral, BID, Christina Price MD, 25 mg at 02/05/25 0824    melatonin tablet 3 mg, 3 mg, Oral, HS PRN, Calin Chacon PA-C, 3 mg at 02/04/25 2159    polyethylene glycol (MIRALAX) packet 17 g, 17 g, Oral, Daily PRN, Calin Chacon PA-C    [START ON 2/6/2025] predniSONE tablet 40 mg, 40 mg, Oral, Daily, Traci Gray MD      Lab Results: I have reviewed the following results:  Results from last 7 days   Lab Units 02/05/25  0547 02/04/25  0638 02/03/25  0501 02/02/25  0538 02/02/25  0456   WBC Thousand/uL 6.84 5.86 6.47  --  8.13   HEMOGLOBIN g/dL 10.8* 10.8* 10.3*  --  11.2*   HEMATOCRIT % 38.1 37.6 35.7*  --  39.6   PLATELETS Thousands/uL 95* 96* 86*  --  91*   POTASSIUM mmol/L 3.9 5.2 4.4  --  4.2   CHLORIDE mmol/L 101 99 97  --  93*   CO2 mmol/L >45* >45* >45*  --  >45*   BUN mg/dL 31* 33* 26*  --  22   CREATININE mg/dL 0.75 0.83 0.85  --  0.81   CALCIUM mg/dL 9.1 9.5 9.4  --  10.1   MAGNESIUM mg/dL  --   --   --  2.4  --    PHOSPHORUS mg/dL  --   --   --  2.4  --   "  ALBUMIN g/dL  --   --   --   --  4.1       Administrative Statements     Portions of the record may have been created with voice recognition software. Occasional wrong word or \"sound a like\" substitutions may have occurred due to the inherent limitations of voice recognition software. Read the chart carefully and recognize, using context, where substitutions have occurred.If you have any questions, please contact the dictating provider.  "

## 2025-02-05 NOTE — ASSESSMENT & PLAN NOTE
Not on any diuretic therapy, CT of the chest abdomen pelvis without contrast does not show any overt pulmonary edema

## 2025-02-05 NOTE — ASSESSMENT & PLAN NOTE
New right upper lobe opacity on imaging no leukocytosis procalcitonin negative  Pulmonology on board   Patient requests all Lab and Radiology Results on their Discharge Instructions

## 2025-02-05 NOTE — ASSESSMENT & PLAN NOTE
"Patient has history of COPD FEV1 0.54 L 25% protected in 2024.  At baseline requires nebulization therapy, not on azithromycin due to hearing loss.    -Presented with acute exacerbation and altered mentation carbon dioxide retention.  -Continue with steroids, completed azithromycin  -Continue with nebulization.  -Palliative care consulted meeting tomorrow 2/4/25-patient did not wanted to discuss CODE STATUS or goals of care\" refused any further discussion said he will think about it by tomorrow  -Pulmonology on board appreciate recs  "

## 2025-02-05 NOTE — PROGRESS NOTES
"Progress Note - Hospitalist   Name: Severiano Feliciano 89 y.o. 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    Acute exacerbation of chronic obstructive pulmonary disease (COPD) (HCC)  Patient has history of COPD FEV1 0.54 L 25% protected in 2024.  At baseline requires nebulization therapy, not on azithromycin due to hearing loss.    -Presented with acute exacerbation and altered mentation carbon dioxide retention.  -Continue with steroids, completed azithromycin  -Continue with nebulization.  -Palliative care consulted meeting tomorrow 2/4/25-patient did not wanted to discuss CODE STATUS or goals of care\" refused any further discussion said he will think about it by tomorrow  -Pulmonology on board appreciate recs  Chronic diastolic (congestive) heart failure (HCC)  Wt Readings from Last 3 Encounters:   02/02/25 60.3 kg (132 lb 15 oz)   11/05/24 60.1 kg (132 lb 9.6 oz)   09/20/24 59.3 kg (130 lb 12.8 oz)     Echo on 4/11/2023 reports normal ventricular cavity size with grade 1 diastolic dysfunction.  As per chart review patient takes torsemide 5 mg daily but as per wife and son there was some issues with allergies to furosemide and torsemide.  The family said they will find out the paperwork to know the exact medication with which patient is allergic and then can be restarted tomorrow    Abnormal CT of the chest  Right middle lobe nodule endorsed in January 2024, outpatient follow-up not " needed as per pulmonology  Solitary pulmonary nodule on lung CT  New right upper lobe opacity on imaging no leukocytosis procalcitonin negative  Pulmonology on board  Palliative care encounter  Patient was seen by palliative this morning, meeting with family scheduled for tomorrow 2//2025 to discuss CODE STATUS and further care.  Hypernatremia  Blood workup showed sodium levels 150  Nephrology on board appreciate recs    VTE Pharmacologic Prophylaxis:    Enoxaparin    Mobility:   Basic Mobility Inpatient Raw Score: 15  -HLM Goal: 4: Move to chair/commode  -HLM Achieved: 2: Bed activities/Dependent transfer      Education and Discussions with Family / Patient: Updated  (son) via phone.    Current Length of Stay: 3 day(s)  Current Patient Status: Inpatient     Discharge Plan: Anticipate discharge in >72 hrs to to be decided    Code Status: Level 1 - Full Code    Subjective   Patient seen and examined at bedside.  Patient arousable but more somnolent as compared to yesterday.  Patient received Ativan overnight.  Wife states much better    Objective :  Temp:  [98.4 °F (36.9 °C)-99.1 °F (37.3 °C)] 98.5 °F (36.9 °C)  HR:  [69-96] 96  BP: ()/(51-80) 138/80  Resp:  [18-22] 20  SpO2:  [92 %-96 %] 92 %  O2 Device: High flow nasal cannula  Nasal Cannula O2 Flow Rate (L/min):  [30 L/min-35 L/min] 35 L/min  FiO2 (%):  [40-60] 60    Body mass index is 23.55 kg/m².     Input and Output Summary (last 24 hours):     Intake/Output Summary (Last 24 hours) at 2/5/2025 1240  Last data filed at 2/4/2025 1910  Gross per 24 hour   Intake 120 ml   Output --   Net 120 ml       Physical Exam  Vitals and nursing note reviewed.   Constitutional:       General: He is not in acute distress.     Appearance: He is well-developed.   HENT:      Head: Normocephalic and atraumatic.   Eyes:      Conjunctiva/sclera: Conjunctivae normal.   Cardiovascular:      Rate and Rhythm: Normal rate and regular rhythm.      Heart sounds: No  murmur heard.  Pulmonary:      Effort: Pulmonary effort is normal. No respiratory distress.      Breath sounds: Normal breath sounds.   Abdominal:      General: Bowel sounds are normal.      Palpations: Abdomen is soft.      Tenderness: There is no abdominal tenderness.   Musculoskeletal:         General: No swelling.      Cervical back: Neck supple.   Skin:     General: Skin is warm and dry.      Capillary Refill: Capillary refill takes less than 2 seconds.   Neurological:      Mental Status: He is alert.      Comments: More somnolent today versus yesterday   Psychiatric:         Mood and Affect: Mood normal.           Lines/Drains:  Lines/Drains/Airways       Active Status       Name Placement date Placement time Site Days    External Urinary Catheter 02/04/25  1206  -- 1                            Lab Results: I have reviewed the following results:   Results from last 7 days   Lab Units 02/05/25  0547 02/04/25  0638 02/03/25  0501   WBC Thousand/uL 6.84   < > 6.47   HEMOGLOBIN g/dL 10.8*   < > 10.3*   HEMATOCRIT % 38.1   < > 35.7*   PLATELETS Thousands/uL 95*   < > 86*   SEGS PCT %  --   --  81*   LYMPHO PCT %  --   --  9*   MONO PCT %  --   --  9   EOS PCT %  --   --  0    < > = values in this interval not displayed.     Results from last 7 days   Lab Units 02/05/25  0547 02/03/25  0501 02/02/25  0456   SODIUM mmol/L 150*   < > 146   POTASSIUM mmol/L 3.9   < > 4.2   CHLORIDE mmol/L 101   < > 93*   CO2 mmol/L >45*   < > >45*   BUN mg/dL 31*   < > 22   CREATININE mg/dL 0.75   < > 0.81   CALCIUM mg/dL 9.1   < > 10.1   ALBUMIN g/dL  --   --  4.1   TOTAL BILIRUBIN mg/dL  --   --  0.68   ALK PHOS U/L  --   --  54   ALT U/L  --   --  13   AST U/L  --   --  23   GLUCOSE RANDOM mg/dL 95   < > 102    < > = values in this interval not displayed.     Results from last 7 days   Lab Units 02/02/25  0456   INR  1.04     Results from last 7 days   Lab Units 02/03/25  2208   POC GLUCOSE mg/dl 136         Results from last 7 days    Lab Units 02/03/25  0501 02/02/25  0456   LACTIC ACID mmol/L  --  1.2   PROCALCITONIN ng/ml 0.12 0.11       Recent Cultures (last 7 days):   Results from last 7 days   Lab Units 02/02/25  0458 02/02/25  0456   BLOOD CULTURE  No Growth at 72 hrs. No Growth at 72 hrs.       XR chest portable  Result Date: 2/2/2025  Impression: Mild pulmonary venous congestion. Moderate opacity in the right lower lobe. See subsequent chest CT. Emphysema. Workstation performed: [id number]     CT chest abdomen pelvis w contrast  Result Date: 2/2/2025  Impression: 1. Patchy opacity posterolateral right upper lobe could represent atelectasis or pneumonia. Little change in chronic right lower lobe consolidation. 2. Background emphysema. Slowly enlarging 8 mm right middle lobe nodule. Possibility of neoplasm cannot be excluded. Based on current Fleischner Society 2017 Guidelines on incidental pulmonary nodule, follow-up non-contrast CT is recommended at 6 months from the initial examination and, if stable at that time, an additional follow-up is recommended for 18-24 months from the initial examination. 3. Thickening of the urinary bladder with hyperemia involving posterior lateral bladder diverticulum. Correlate with urinalysis regarding cystitis. 4. 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 No change in aneurysmal dilatation junction distal ascending aorta/arch. 5. Question thickening of the proximal esophagus. Consider esophagram. 6. Diffuse colonic diverticulosis without diverticulitis. Moderate to large amount of colonic stool suggesting constipation. 7. Cholelithiasis. Additional incidental findings as above. The study was marked in EPIC for follow-up. Workstation performed: [id number]     CT spine cervical without contrast  Result Date: 2/2/2025  Impression: No cervical spine fracture or traumatic malalignment. Workstation performed: [id number]      CT head without contrast  Result Date: 2/2/2025  Impression: No acute intracranial abnormality.  Chronic microangiopathic changes. 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 Workstation performed: [id number]         Last 24 Hours Medication List:     Current Facility-Administered Medications:     acetaminophen (TYLENOL) tablet 975 mg, Q8H PRN    aluminum-magnesium hydroxide-simethicone (MAALOX) oral suspension 30 mL, Q4H PRN    azithromycin (ZITHROMAX) 500 mg in sodium chloride 0.9 % 250 mL IVPB, Q24H, Last Rate: 500 mg (02/04/25 1410)    divalproex sodium (DEPAKOTE SPRINKLE) capsule 125 mg, HS PRN **OR** valproate (DEPACON) 125 mg in sodium chloride 0.9 % 50 mL IVPB, HS PRN, Last Rate: 125 mg (02/02/25 2021)    enoxaparin (LOVENOX) subcutaneous injection 40 mg, Daily    guaiFENesin (ROBITUSSIN) oral liquid 200 mg, Q4H PRN    ipratropium (ATROVENT) 0.02 % inhalation solution 0.5 mg, TID    levalbuterol (XOPENEX) inhalation solution 1.25 mg, TID **AND** [DISCONTINUED] sodium chloride 0.9 % inhalation solution 3 mL, TID    losartan (COZAAR) tablet 25 mg, BID    melatonin tablet 3 mg, HS PRN    polyethylene glycol (MIRALAX) packet 17 g, Daily PRN    [START ON 2/6/2025] predniSONE tablet 40 mg, Daily    Administrative Statements   Today, Patient Was Seen By: Christina Price MD  I have spent a total time of >35 minutes in caring for this patient on the day of the visit/encounter including Diagnostic results, Prognosis, Risks and benefits of tx options, Instructions for management, Patient and family education, Importance of tx compliance, Risk factor reductions, Impressions, Counseling / Coordination of care, Documenting in the medical record, Reviewing / ordering tests, medicine, procedures  , Obtaining or reviewing history  , and Communicating with other healthcare professionals .    **Please Note: This note may have been constructed using a voice recognition system.**

## 2025-02-05 NOTE — PROGRESS NOTES
Progress Note - Pulmonology   Name: Severiano Feliciano 89 y.o. male I MRN: [id number]  Unit/Bed#: E4 -01 I Date of Admission: 2/2/2025   Date of Service: 2/5/2025 I Hospital Day: 3    Assessment & Plan  Acute exacerbation of chronic obstructive pulmonary disease (COPD) (HCC)  -Very severe COPD, FEV1 0.54L 25% predicted in 2024  -Follows with Dr. Hayward  -Baseline regimen includes all nebulized therapy. Not on chronic azithromycin due to hearing loss  -Presenting with acute exacerbation  -Continue IV steroids with methylpred 40mg daily today - transition to prednisone 40mg daily tomorrow. Decrease by 10mg every 3 days   -Continue azithromycin (day 3/3)  -Continue nebulized bronchodilators  -Patient will need outpatient pulmonary follow up - message sent to arrange for visit   Acute on chronic respiratory failure with hypoxia and hypercapnia (HCC)  -At baseline, on 3L supplemental oxygen with exertion  -Currently on HFNC 35L, 75% FiO2. This is increased from yesterday, likely secondary to ativan administration for agitation  -Continue to wean supplemental oxygen to maintain SpO2 > 88%  -Encourage OOB as tolerated, incentive spirometry  -Ambulatory O2 evaluation prior to discharge  -Recommend avoiding sedating medications and using restraints if needed  Pulmonary hypertension (HCC)  -Likely WHO group III  -Continue to monitor volume status  Chronic diastolic (congestive) heart failure (HCC)  -Weight stable  -Continue diuresis per primary team  JAY treated with BiPAP  -Not compliant with BIPAP at home  -Continue nightly BiPAP while inpatient  Abnormal CT of the chest  -New RUL opacity on imaging; no leukocytosis, procalcitonin negative.  -Repeat procal negative  -Consider stopping ceftriaxone  Solitary pulmonary nodule on lung CT  -8 x 5 mm right middle lobe nodule, most recently 5 x 3 mm (3/163) and about 4 x 2 mm in January 2024.   -Outpt follow up. Given age and severe co-morbidities, this likely does not need  to be followed.  Palliative care encounter  -Palliative care consulted - patient not interested in discussing goals of care or updating code status at this time    24 Hour Events : Patient again agitated overnight. Received IV ativan 0.5mg.   Subjective : Patient examined at bedside this morning. He was somnolent. He was on HFNC 35L 75% FiO2. Difficult to arouse but had received ativan overnight. Patient    Objective :  Temp:  [97.4 °F (36.3 °C)-99.1 °F (37.3 °C)] 99.1 °F (37.3 °C)  HR:  [69-96] 85  BP: ()/(51-75) 136/65  Resp:  [18-22] 20  SpO2:  [91 %-96 %] 96 %  O2 Device: High flow nasal cannula  Nasal Cannula O2 Flow Rate (L/min):  [30 L/min-35 L/min] 35 L/min  FiO2 (%):  [40-60] 60    Physical Exam  Vitals reviewed.   Constitutional:       General: He is not in acute distress.     Appearance: He is ill-appearing.      Comments: Thin, cachectic   HENT:      Head: Normocephalic and atraumatic.   Eyes:      Pupils: Pupils are equal, round, and reactive to light.   Cardiovascular:      Rate and Rhythm: Normal rate and regular rhythm.      Heart sounds: Normal heart sounds.   Pulmonary:      Effort: Pulmonary effort is normal.      Breath sounds: No wheezing, rhonchi or rales.      Comments: Clear but diminished  Skin:     General: Skin is warm and dry.      Capillary Refill: Capillary refill takes less than 2 seconds.   Psychiatric:      Comments: Unable to assess. Somnolent           Lab Results: I have reviewed the following results:   .     02/05/25  0547   WBC 6.84   HGB 10.8*   HCT 38.1   PLT 95*   SODIUM 150*   K 3.9      CO2 >45*   BUN 31*   CREATININE 0.75   GLUC 95     ABG:   No new results in last 24 hours.    Labs per my review reveal normal renal function, metabolic alkalosis, stable anemia     RP2 negative     ABG shows chronic compensated hypercapnia and hypoxia     Imaging Results Review: I personally reviewed the following image studies in PACS and associated radiology reports: CT  chest. My interpretation of the radiology images/reports is: CT chest per my review shows mild to moderate emphysema, right lower lobe consolidation unchanged, right upper lobe opacity, 8 x 5 mm right middle lobe nodule, mucus in right mainstem bronchus.     PFT Results Reviewed: reviewed and interpreted  PFTs 3/6/2024 per my review shows very severe obstruction FEV1 0.54 L, 25% predicted    Traci Gray MD  Pulmonary & Critical Care Medicine Fellow PGY-4  [facility]

## 2025-02-05 NOTE — ASSESSMENT & PLAN NOTE
-New RUL opacity on imaging; no leukocytosis, procalcitonin negative.  -Repeat procal negative  -Consider stopping ceftriaxone   Plan:  1. Continue the good progress and keep aiming for 20-30 grams of protein per meal. Eggs/tuna/chicken.  1-2 servings of skim milk is preferable to the orange juice.  2. Continue working with PT on your walking/strength [place] and continuing your home exercises.  3. Continue Trulicity, blood sugars looking excellent continue the 1.5 mg/week dose.  We'll consider Plenity when available this next year (not yet due for release due to manufacturing issues).     Example Meal Plan for a 7447-3613 Calorie Diet:    In order to fuel your weight loss properly and avoid hunger-induced overeating later in the day, you will enjoy the most success by following the diet below or similar with adjustments based on your particular tastes and preferences.      I recommend getting into a meal routine and keeping it similar day to day in the beginning so you don t have to think too hard about what you re going to make/eat.  Keep snacks healthy, ideally containing protein.  Non-processed food is preferable to packaged items.  Eat at least a few crunchy green vegetables at snack times, which should be 2-3 hours after your mealtimes(prepare these ahead of time) once or twice daily if very active.  Drink 64 oz -90 oz of water daily.  During a diet we often mistake thirst for hunger or just have some grazing habits we have to break ('bored/mindless eating') and a glass of water and reconsideration of our hunger is often all that is needed.      If you re having hunger problems, add a protein drink to your morning hours or afternoon snack with at least 20grams of protein and not too much sugar.  A carton of higher protein/low sugar yogurt can work as well.  If the urge to snack is overwhelming and not satiated, try going for a 10 minute walk/exercise, come home and drink a glass of water and if still hungry, have a  calorie snack (handful of raw/sprouted nuts, veggies and string cheese,  protein bar, etc).      It is better to have a large breakfast, a moderate lunch and a smaller dinner to fuel your day.  People lose 10-15% more weight during their weight loss season with this strategy.    To make sure you re getting adequate vitamins and minerals during weight loss, I recommend one complete multivitamin a day of your choice.  Consider a probiotic and taking some vitamin D 2000 IU daily.    Let supper be your last meal of the day and ideally try to have at least 12 hours between supper and breakfast the next day to tap into some beneficial overnight fasting dynamics.  Water in the evening is fine, unsweetened herbal teas as well.  Consolidating your meals within a 8-12 hour period of your day will help tap into these additional metabolic benefits and tends to keep your appetite up for breakfast, further helping to stay on track.  For most of my patients I don't recommend an intermittent fasting style diet (many find it hard to fit in their lifestyle) but an overnight fast is very doable for most patients and helps regulate our hunger drives a little better.  This makes it very important to nail good intake at all three meals to feel satisfied/energized and still lose weight.      Example Meal Plan:  Breakfast: 450-475 Calories  1 egg cooked on low in olive oil:   calories.  5oz Greek Yogurt (Fage plain classic: ~150 alexsandra)  Handful of Berries of your choice (about a calorie per berry or 20-40cal per handful)    cup(cooked) of  old fashioned oatmeal or 1/2 cup(cooked) steel cut oats. (150 alexsandra)  Sprinkle amount of brown sugar and a pat of butter. (40 alexsandra)  Glass of  Water  Black coffee or unsweetened Tea (0calories).      2-3 hours Later Snack: (195 calories).  Glass of water  One string Cheese (80 calories) or 4 oz creamed cottage cheese (115 calories) with  Crunchy Celery sticks (less than 10 calories per large stalk) 2 stalks. (20 calories)    of a  Large Banana or   of a Large Apple (60  calories):  eat second half at lunch or afternoon snack.     Lunch:300 -350 calories   Chicken Breast  (baked/broiled/roasted/grilled)  4-6 oz.  (125-180 alexsandra), BBQ sauce/hot sauce/mustard/seasoning is free. Just use a reasonable amount. Or a can of tuna with 1 tablespoon mayonnaise.  Salad: lettuce, any other veggies (cucumbers, green peppers/celery you like and a small drizzle of dressing to just flavor.  Go as big on the veggies as you like,  as they are practically calorie free.   A whole, 8 inch cucumber is 45 calories, a whole green pepper is 23 calories, a stalk of celery is 9 calories.  [place] Dressing is 60 calories per tablespoon..so moderate your desired dressing or do a drizzle of olive oil and splash of balsamic vinegar on top,  Total calories unlikely to be over 150 even with dressing.  Glass of Water.    Option for lunch is meal replacement protein drink/smoothie.  Need at least 20 grams of protein and eat the rest of your apple/banana from the morning snack.      Afternoon Snack: 150-200 calories   Cheese Stick or cottage cheese again  and a fresh fruit OR  Granola Bar (protein Bar acceptable if under 200 calories OR  Homemade smoothies:  8oz skim milk,  a handful of berries (fresh or frozen and a serving of protein powder such as BiPro or Sofia sWhey for example.  If you don't like dairy, make with 8oz water, one small banana, handful of berries and the protein powder, add any veggies you want as well:  roughly 200 calories.   Glass of Water    Dinner: 325 calories  4oz of fresh, Atlantic salmon.  Broiled (salt/pepper/dill) for about 8-8.5 minutes (200calories) or  4oz filet mignon steak or sirloin steak  Salad or vegetable sautéed lightly in olive oil or   Broccoli 1.5  cups chopped and steamed  or micro-waved in a little water (75 calories)  Glass of Water,    Cup of herbal tea (unsweetened, caffeine free)      Herbs and seasonings are free and encouraged to flavor your foods/vegetables.   "Make your food delicious.    Tips for Success:  1.  Prepare proteins ahead of time (broil chicken breasts in bulk so you can grab and go), steel cut oats can be stored in casserole dish/bowl in the fridge for quick scoop in the morning and rewarm in microwave, make use of crock pot recipes (watch salt content).  Making meals that cover 3-4 future meals is an easy way to stay on track.  2.  Drink a 8-12 oz glass of water every 2-3 hours when awake.  We often mistake hunger for thirst, especially when losing weight.  3. Remember your Reward and Motivation when things get hard.  4.  Weigh yourself every morning and record, you'll stay on track better and learn how our biorhythms, diet and elimination patterns show up on the scale. Don't worry about 1 or 2 day patterns, but when on track you'll notice good trend downward of weight over 3-4 day segments.  Plateaus tend to resolve after 4-8 days in most cases if you stay consistent with your plan.  These are natural and part of weight loss, even if you're perfect with your plan execution.  5. Call if problems/concerns.  [employer] is a great tool to stay in touch and provide weekly outside accountability.   6.  Find a handful of meals/foods that keep you on track and feeling good and get into a routine that is sustainable for you.  7.  Take a complete multivitamin just to make sure all micronutrients are adequate during weight loss.  8. If losing hair/brittle nails it usually means you are not taking enough protein.  Minimum goal is 60 grams daily of protein for smaller women, 80 grams a day for men. Consider taking Biotin as supplement or a \"Hair and Nail\" multivitamin.        The Frozen Food Diet  For those on the go who may have relied heavily on fast food in the past, we want to break away from that routine.  But life sometimes may limit our time for shopping/cooking or perhaps your skills in the kitchen are limited.  To help get on the right food, here are some options " "for using frozen food/reheatable food that may keep you on track with protein/caloric goals and keep things simple as you first jump into weight loss season or look to a break from your current meal routine.     The listed foods are examples of what may help keep most on track but your stores may carry different options.  Start reading labels!  As long as your frozen meals have 18-30 grams of protein per meal they should do the job. Experience suggests most of these meals will be around 280-400 calories.   The protein content is the most improtant. For those who are salt sensitive, looking at sodium content is important as well and most of us would do well to keep our daily sodium intake under 2000-2300mg anyway.   My suggestion is to find your favorite 2 frozen meals that fit the protein goal, load up, keep it simple and use them regularly 2 meals daily. A piece of fruit (apple/berries/banana) in the AM between breakfast and lunch and trying not to go more than about 5 hours between your meals should help keep things on track. For heavy exercisers or people with higher resting metabolic rates, you may feel better w/ a protein supplement in mid afternoon.  Try to let supper be your last food of the day and stick to water otherwise. If you absolutely need bubbly beverages, carbonated thomas such as [place]/Bubbly/etc (\"essenced water\" without sugar/artificial sweetener) may be refreshing options as well (much better if cold).     Your grocery may carry better/different options then what is listed,  most of those are decent options, just look at the labels to make sure.      EXAMPLES of quick/frozen meals/sides:  Healthy Choice Power Bowls:  Chicken Feta and Farro:  ~$4.00. 310 kcal. Protein 23 grams. 600mg sodium, Fiber 6g. Sugar 2g.   Evol Fire Grilled steak bowls: $4.49. 400 kcal, 20g protein, 520mg sodium  fiber 8, sugar 3.    Evol Chicken Enchilada bake bowl: $4.49. 350 kcal, 21 g protein, 610mg sodium, " fiber 7, sugar 3    Frontera Chicken Fajita bowl: $4.99 on sale $3.59. 260 kcal, 22 g protein, 700mg sodium, fiber 8, sugar 8.    Frontera, Green chili grilled chicken taco bowl $4.99 on sale $3.59. 240 kcal, protein 20g, sodium 710, fiber 6g, sugar 6g.    Frontera chicken and chorizo taco bowl. $4.99 on sale $3.59. 290 kcal, 19g protein. 660 mg sodium, 7g fiber, 6 g sugar.    Zo Up, Chicken: soy based food. $4.99 for 4 patties. If using for protein source, encourage 2 patties, 280 kcal, 24 g protein 740mg sodium, fiber 6g, sugar 4g. (add to veggie mix for meal gets 310 kcal if 3/4 cup of mediterranean veggie mix).  Feel free to use condiments like ketchup/mustard or salsa.    Lu , chicken ware: $4.99, $3.59 on sale. 320 kcal, 20g protein, 750mg sodium, 3g fiber, 4 g sugar    eggs: $2.99-$6.99 per dozen. 70 kcal per egg, 6 -7g protein per egg.    365 [place] blend frozen veggies (5 servings of 3/4 cup): $2.69. 25kcal    365 organic broccoli florets. $2.69 4.5 servings of 1 cup. 30 kcal.      Mozarella cheese: sticks 1 oz (6 pack is $3.60 on sale if organic) 80 kcal, 8 g protein,     Salsa: 365 salsa campbell méndez chil: $2.99 per bottle, 15 servings) 2 tablespoons 10 kcal, 0 protein, 200mg sodium. fiber 0, sugar 1    Water, coffee/unsweetened tea only.  Get at least 64 oz daily. Maximum safe amount is generally up to one half of your body weight in pounds in most cases (unless you're on water restriction for cardiac issues).  Fruit: apples, berries, no more than one banana daily, grapes, watermelon/cantaloupe. Keep it to 1-2 servings daily. Serving of berries is a generous handful. Same with grapes.  Melon:  2 good sized wedges.  Wash your berries/grapes/apples before eating.  Depending on your protein and caloric needs, a person could use frozen meals 2-5 times daily. If you're 6 feet 300 lb man,  you'll likely need an extra serving or two compared to a 5 foot, 200 lb women.  Follow your appetite,  hunger and energy levels and how they relate to your intake and timing of meals. Weigh yourself daily if possible.  Have a great season!

## 2025-02-05 NOTE — ASSESSMENT & PLAN NOTE
-Palliative care consulted - patient not interested in discussing goals of care or updating code status at this time

## 2025-02-05 NOTE — ASSESSMENT & PLAN NOTE
Blood pressures currently acceptable  Pressure lowering medications include losartan 25 mg 2 times daily

## 2025-02-05 NOTE — ASSESSMENT & PLAN NOTE
No restrictions would have him drink free water  He has about a 2.1 L free water deficit if he is able to drink we will allow for him to do this  We will give him 500 cc of D5W if this worsens his shortness of breath please stop.  Will run at 50 cc an hour  Repeat BMP tomorrow

## 2025-02-05 NOTE — ASSESSMENT & PLAN NOTE
-At baseline, on 3L supplemental oxygen with exertion  -Currently on HFNC 35L, 75% FiO2. This is increased from yesterday, likely secondary to ativan administration for agitation  -Continue to wean supplemental oxygen to maintain SpO2 > 88%  -Encourage OOB as tolerated, incentive spirometry  -Ambulatory O2 evaluation prior to discharge  -Recommend avoiding sedating medications and using restraints if needed

## 2025-02-06 LAB
BUN SERPL-MCNC: 25 MG/DL (ref 5–25)
CALCIUM SERPL-MCNC: 9.1 MG/DL (ref 8.4–10.2)
CHLORIDE SERPL-SCNC: 101 MMOL/L (ref 96–108)
CO2 SERPL-SCNC: >45 MMOL/L (ref 21–32)
CREAT SERPL-MCNC: 0.79 MG/DL (ref 0.6–1.3)
ERYTHROCYTE [DISTWIDTH] IN BLOOD BY AUTOMATED COUNT: 13.2 % (ref 11.6–15.1)
GFR SERPL CREATININE-BSD FRML MDRD: 80 ML/MIN/1.73SQ M
GLUCOSE SERPL-MCNC: 93 MG/DL (ref 65–140)
HCT VFR BLD AUTO: 36.6 % (ref 36.5–49.3)
HGB BLD-MCNC: 10.4 G/DL (ref 12–17)
MCH RBC QN AUTO: 32.1 PG (ref 26.8–34.3)
MCHC RBC AUTO-ENTMCNC: 28.4 G/DL (ref 31.4–37.4)
MCV RBC AUTO: 113 FL (ref 82–98)
PLATELET # BLD AUTO: 82 THOUSANDS/UL (ref 149–390)
PMV BLD AUTO: 9.7 FL (ref 8.9–12.7)
POTASSIUM SERPL-SCNC: 4.6 MMOL/L (ref 3.5–5.3)
RBC # BLD AUTO: 3.24 MILLION/UL (ref 3.88–5.62)
SODIUM SERPL-SCNC: 150 MMOL/L (ref 135–147)
WBC # BLD AUTO: 5.98 THOUSAND/UL (ref 4.31–10.16)

## 2025-02-06 PROCEDURE — 85027 COMPLETE CBC AUTOMATED: CPT

## 2025-02-06 PROCEDURE — 94762 N-INVAS EAR/PLS OXIMTRY CONT: CPT

## 2025-02-06 PROCEDURE — 99232 SBSQ HOSP IP/OBS MODERATE 35: CPT | Performed by: INTERNAL MEDICINE

## 2025-02-06 PROCEDURE — 94760 N-INVAS EAR/PLS OXIMETRY 1: CPT

## 2025-02-06 PROCEDURE — 99232 SBSQ HOSP IP/OBS MODERATE 35: CPT

## 2025-02-06 PROCEDURE — 80048 BASIC METABOLIC PNL TOTAL CA: CPT

## 2025-02-06 PROCEDURE — 94640 AIRWAY INHALATION TREATMENT: CPT

## 2025-02-06 RX ORDER — DEXTROSE MONOHYDRATE 50 MG/ML
50 INJECTION, SOLUTION INTRAVENOUS CONTINUOUS
Status: DISPENSED | OUTPATIENT
Start: 2025-02-06 | End: 2025-02-07

## 2025-02-06 RX ADMIN — LEVALBUTEROL HYDROCHLORIDE 1.25 MG: 1.25 SOLUTION RESPIRATORY (INHALATION) at 19:02

## 2025-02-06 RX ADMIN — PREDNISONE 40 MG: 20 TABLET ORAL at 08:28

## 2025-02-06 RX ADMIN — LEVALBUTEROL HYDROCHLORIDE 1.25 MG: 1.25 SOLUTION RESPIRATORY (INHALATION) at 06:58

## 2025-02-06 RX ADMIN — IPRATROPIUM BROMIDE 0.5 MG: 0.5 SOLUTION RESPIRATORY (INHALATION) at 19:02

## 2025-02-06 RX ADMIN — IPRATROPIUM BROMIDE 0.5 MG: 0.5 SOLUTION RESPIRATORY (INHALATION) at 12:40

## 2025-02-06 RX ADMIN — DEXTROSE 50 ML/HR: 5 SOLUTION INTRAVENOUS at 15:44

## 2025-02-06 RX ADMIN — LEVALBUTEROL HYDROCHLORIDE 1.25 MG: 1.25 SOLUTION RESPIRATORY (INHALATION) at 12:40

## 2025-02-06 RX ADMIN — ENOXAPARIN SODIUM 40 MG: 40 INJECTION SUBCUTANEOUS at 08:31

## 2025-02-06 RX ADMIN — LOSARTAN POTASSIUM 25 MG: 25 TABLET, FILM COATED ORAL at 08:28

## 2025-02-06 RX ADMIN — LOSARTAN POTASSIUM 25 MG: 25 TABLET, FILM COATED ORAL at 20:50

## 2025-02-06 RX ADMIN — IPRATROPIUM BROMIDE 0.5 MG: 0.5 SOLUTION RESPIRATORY (INHALATION) at 06:58

## 2025-02-06 NOTE — ASSESSMENT & PLAN NOTE
-Palliative care following - patient wants to go home which he is unable to do requiring this level of oxygen. He is unhappy with getting treatment but is not ready to make any decisions regarding CODE status. Plan for family meeting tomorrow at 1pm

## 2025-02-06 NOTE — ASSESSMENT & PLAN NOTE
-At baseline, on 3L supplemental oxygen with exertion  -Currently on HFNC 35L 50% FiO2  -Continue nightly BiPAP as needed  -Continue to wean supplemental oxygen to maintain SpO2 > 88%  -Encourage OOB as tolerated, incentive spirometry  -Ambulatory O2 evaluation prior to discharge  -Recommend avoiding sedating medications and using restraints if needed

## 2025-02-06 NOTE — QUICK NOTE
Chart and case were reviewed by Jennifer Paige Bloch, CRNP.  Mode of review included electronic chart check, and review of case with SLIM.    Goals goals are currently clear.  Patient and family wish to continue level 1 code status and full care.  Family meeting scheduled for 1:00 tomorrow to discuss this with patient and family in person.    Symptoms are being managed by primary team.    For dispo plan, please review Case Management notes.     Palliative care will return on 2/7/25.    For urgent issues or any questions/concerns, please notify on-call provider via Epic Secure Chat.  You may also call our answering service 24/7 at [phone].    Jennifer Paige Bloch, CRNP  Palliative and Supportive Care  Clinic/Answering Service: [phone]  You can find me on Echo it Chat!

## 2025-02-06 NOTE — PROGRESS NOTES
"Progress Note - Hospitalist   Name: Severiano Feliciano 89 y.o. male I MRN: [id number]  Unit/Bed#: E4 -01 I Date of Admission: 2/2/2025   Date of Service: 2/6/2025 I Hospital Day: 4    Assessment & Plan  Hypertension  Continue prior to admission losartan  Pulmonary hypertension (HCC)  Likely WHO group 3  Continue monitor volume status  Acute on chronic respiratory failure with hypoxia and hypercapnia (HCC)  Currently requiring high flow nasal cannula 35 L, 50% FiO2  Secondary to COPD exacerbation  Continue to wean off supplemental oxygen to maintain SpO2 greater than 88%  At baseline takes 3 L supplemental oxygen with exertion.    Acute exacerbation of chronic obstructive pulmonary disease (COPD) (HCC)  Patient has history of COPD FEV1 0.54 L 25% protected in 2024.  At baseline requires nebulization therapy, not on azithromycin due to hearing loss.    -Presented with acute exacerbation and altered mentation carbon dioxide retention.  -Continue with steroids, completed azithromycin  -Continue with nebulization.  -Palliative care consulted meeting tomorrow 2/4/25-patient did not wanted to discuss CODE STATUS or goals of care\" refused any further discussion said he will think about it by tomorrow  -Pulmonology on board appreciate recs, require outpatient follow-up appointment scheduled for 3/19  Chronic diastolic (congestive) heart failure (HCC)  Wt Readings from Last 3 Encounters:   02/02/25 60.3 kg (132 lb 15 oz)   11/05/24 60.1 kg (132 lb 9.6 oz)   09/20/24 59.3 kg (130 lb 12.8 oz)     Echo on 4/11/2023 reports normal ventricular cavity size with grade 1 diastolic dysfunction.  As per chart review patient takes torsemide 5 mg daily but as per wife and son there was some issues with allergies to furosemide and torsemide.  The family said they will find out the paperwork to know the exact medication with which patient is allergic and then can be restarted tomorrow as family has to still get back    Abnormal CT " of the chest  Right middle lobe nodule endorsed in January 2024, outpatient follow-up not needed as per pulmonology  Solitary pulmonary nodule on lung CT  New right upper lobe opacity on imaging no leukocytosis procalcitonin negative  Pulmonology on board  Palliative care encounter  Palliative care has been on board   -Family meeting tomorrow with palliative care/pulmonology/internal medicine and case management at 1 PM  Hypernatremia  Blood workup showed sodium levels 150  Nephrology on board appreciate recs    VTE Pharmacologic Prophylaxis:    Enoxaparin    Mobility:   Basic Mobility Inpatient Raw Score: 15  -HLM Goal: 4: Move to chair/commode  JH-HLM Achieved: 2: Bed activities/Dependent transfer    Education and Discussions with Family / Patient: Updated  (wife and son) at bedside.    Current Length of Stay: 4 day(s)  Current Patient Status: Inpatient     Discharge Plan: Anticipate discharge in >72 hrs to to be determined    Code Status: Level 1 - Full Code    Subjective   Patient seen examined at bedside more alert and oriented as compared to yesterday.  States that he feels good not happy with how much treatment he is getting but also does not want to change the CODE STATUS or discuss goals of cares    Objective :  Temp:  [97.6 °F (36.4 °C)-98 °F (36.7 °C)] 98 °F (36.7 °C)  HR:  [65-94] 94  BP: (107-155)/(58-81) 148/81  Resp:  [20-22] 22  SpO2:  [88 %-97 %] 93 %  O2 Device: Other (comment)  Nasal Cannula O2 Flow Rate (L/min):  [35 L/min] 35 L/min    Body mass index is 23.55 kg/m².     Input and Output Summary (last 24 hours):   No intake or output data in the 24 hours ending 02/06/25 1536    Physical Exam  Vitals and nursing note reviewed.   Constitutional:       General: He is not in acute distress.     Appearance: He is well-developed.   HENT:      Head: Normocephalic and atraumatic.   Eyes:      Conjunctiva/sclera: Conjunctivae normal.   Cardiovascular:      Rate and Rhythm: Normal rate and  regular rhythm.      Heart sounds: No murmur heard.  Pulmonary:      Effort: Pulmonary effort is normal. No respiratory distress.      Breath sounds: Wheezing present.   Abdominal:      General: Bowel sounds are normal.      Palpations: Abdomen is soft.      Tenderness: There is no abdominal tenderness.   Musculoskeletal:         General: No swelling.      Cervical back: Neck supple.   Skin:     General: Skin is warm and dry.      Capillary Refill: Capillary refill takes less than 2 seconds.   Neurological:      Mental Status: He is alert.      Comments: Alert and oriented to self and place   Psychiatric:         Mood and Affect: Mood normal.           Lines/Drains:              Lab Results: I have reviewed the following results:   Results from last 7 days   Lab Units 02/06/25  0520 02/04/25  0638 02/03/25  0501   WBC Thousand/uL 5.98   < > 6.47   HEMOGLOBIN g/dL 10.4*   < > 10.3*   HEMATOCRIT % 36.6   < > 35.7*   PLATELETS Thousands/uL 82*   < > 86*   SEGS PCT %  --   --  81*   LYMPHO PCT %  --   --  9*   MONO PCT %  --   --  9   EOS PCT %  --   --  0    < > = values in this interval not displayed.     Results from last 7 days   Lab Units 02/06/25  0520 02/03/25  0501 02/02/25  0456   SODIUM mmol/L 150*   < > 146   POTASSIUM mmol/L 4.6   < > 4.2   CHLORIDE mmol/L 101   < > 93*   CO2 mmol/L >45*   < > >45*   BUN mg/dL 25   < > 22   CREATININE mg/dL 0.79   < > 0.81   CALCIUM mg/dL 9.1   < > 10.1   ALBUMIN g/dL  --   --  4.1   TOTAL BILIRUBIN mg/dL  --   --  0.68   ALK PHOS U/L  --   --  54   ALT U/L  --   --  13   AST U/L  --   --  23   GLUCOSE RANDOM mg/dL 93   < > 102    < > = values in this interval not displayed.     Results from last 7 days   Lab Units 02/02/25  0456   INR  1.04     Results from last 7 days   Lab Units 02/03/25  2208   POC GLUCOSE mg/dl 136         Results from last 7 days   Lab Units 02/03/25  0501 02/02/25  0456   LACTIC ACID mmol/L  --  1.2   PROCALCITONIN ng/ml 0.12 0.11       Recent  Cultures (last 7 days):   Results from last 7 days   Lab Units 02/02/25  0458 02/02/25  0456   BLOOD CULTURE  No Growth After 4 Days. No Growth After 4 Days.       XR chest portable  Result Date: 2/2/2025  Impression: Mild pulmonary venous congestion. Moderate opacity in the right lower lobe. See subsequent chest CT. Emphysema. Workstation performed: [id number]     CT chest abdomen pelvis w contrast  Result Date: 2/2/2025  Impression: 1. Patchy opacity posterolateral right upper lobe could represent atelectasis or pneumonia. Little change in chronic right lower lobe consolidation. 2. Background emphysema. Slowly enlarging 8 mm right middle lobe nodule. Possibility of neoplasm cannot be excluded. Based on current Fleischner Society 2017 Guidelines on incidental pulmonary nodule, follow-up non-contrast CT is recommended at 6 months from the initial examination and, if stable at that time, an additional follow-up is recommended for 18-24 months from the initial examination. 3. Thickening of the urinary bladder with hyperemia involving posterior lateral bladder diverticulum. Correlate with urinalysis regarding cystitis. 4. 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 No change in aneurysmal dilatation junction distal ascending aorta/arch. 5. Question thickening of the proximal esophagus. Consider esophagram. 6. Diffuse colonic diverticulosis without diverticulitis. Moderate to large amount of colonic stool suggesting constipation. 7. Cholelithiasis. Additional incidental findings as above. The study was marked in EPIC for follow-up. Workstation performed: [id number]     CT spine cervical without contrast  Result Date: 2/2/2025  Impression: No cervical spine fracture or traumatic malalignment. Workstation performed: [id number]     CT head without contrast  Result Date: 2/2/2025  Impression: No acute intracranial abnormality.  Chronic  microangiopathic changes. 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 Workstation performed: [id number]         Last 24 Hours Medication List:     Current Facility-Administered Medications:     acetaminophen (TYLENOL) tablet 975 mg, Q8H PRN    aluminum-magnesium hydroxide-simethicone (MAALOX) oral suspension 30 mL, Q4H PRN    dextrose 5 % infusion, Continuous    divalproex sodium (DEPAKOTE SPRINKLE) capsule 125 mg, HS PRN **OR** valproate (DEPACON) 125 mg in sodium chloride 0.9 % 50 mL IVPB, HS PRN, Last Rate: 125 mg (02/02/25 2021)    enoxaparin (LOVENOX) subcutaneous injection 40 mg, Daily    guaiFENesin (ROBITUSSIN) oral liquid 200 mg, Q4H PRN    ipratropium (ATROVENT) 0.02 % inhalation solution 0.5 mg, TID    levalbuterol (XOPENEX) inhalation solution 1.25 mg, TID **AND** [DISCONTINUED] sodium chloride 0.9 % inhalation solution 3 mL, TID    losartan (COZAAR) tablet 25 mg, BID    melatonin tablet 3 mg, HS PRN    polyethylene glycol (MIRALAX) packet 17 g, Daily PRN    predniSONE tablet 40 mg, Daily    Administrative Statements   Today, Patient Was Seen By: Christina Price MD  I have spent a total time of >35 minutes in caring for this patient on the day of the visit/encounter including Diagnostic results, Prognosis, Risks and benefits of tx options, Instructions for management, Patient and family education, Importance of tx compliance, Risk factor reductions, Impressions, Counseling / Coordination of care, Documenting in the medical record, Reviewing / ordering tests, medicine, procedures  , Obtaining or reviewing history  , and Communicating with other healthcare professionals .    **Please Note: This note may have been constructed using a voice recognition system.**

## 2025-02-06 NOTE — ASSESSMENT & PLAN NOTE
No restrictions would have him drink free water, discussed with nurse and family to have him try to drink more fluids  He has about a 2.1 L free water deficit if he is able to drink we will allow for him to do this  Will give him another 500 cc of d5w  Repeat BMP tomorrow

## 2025-02-06 NOTE — PROGRESS NOTES
Progress Note - Pulmonology   Name: Severiano Feliciano 89 y.o. male I MRN: [id number]  Unit/Bed#: E4 -01 I Date of Admission: 2/2/2025   Date of Service: 2/6/2025 I Hospital Day: 4    Assessment & Plan  Acute exacerbation of chronic obstructive pulmonary disease (COPD) (HCC)  -Very severe COPD, FEV1 0.54L 25% predicted in 2024  -Follows with Dr. Hayward  -Baseline regimen includes all nebulized therapy. Not on chronic azithromycin due to hearing loss  -Presenting with acute exacerbation  -Prednisone 40mg daily, decrease by 10mg every 3 days   -Received 3 days of azithromycin  -Continue nebulized bronchodilators  -Patient will need outpatient pulmonary follow up - appointment scheduled for 3/19  Acute on chronic respiratory failure with hypoxia and hypercapnia (HCC)  -At baseline, on 3L supplemental oxygen with exertion  -Currently on HFNC 35L 50% FiO2  -Continue nightly BiPAP as needed  -Continue to wean supplemental oxygen to maintain SpO2 > 88%  -Encourage OOB as tolerated, incentive spirometry  -Ambulatory O2 evaluation prior to discharge  -Recommend avoiding sedating medications and using restraints if needed  Pulmonary hypertension (HCC)  -Likely WHO group III  -Continue to monitor volume status  Chronic diastolic (congestive) heart failure (HCC)  -Weight stable  -Continue diuresis per primary team  JAY treated with BiPAP  -Not compliant with BIPAP at home  -Continue nightly BiPAP while inpatient  Abnormal CT of the chest  -New RUL opacity on imaging; no leukocytosis, procalcitonin negative.  -Repeat procal negative  Solitary pulmonary nodule on lung CT  -8 x 5 mm right middle lobe nodule, most recently 5 x 3 mm (3/163) and about 4 x 2 mm in January 2024.   -Outpt follow up. Given age and severe co-morbidities, this likely does not need to be followed.  Palliative care encounter  -Palliative care following - patient wants to go home which he is unable to do requiring this level of oxygen. He is unhappy  with getting treatment but is not ready to make any decisions regarding CODE status. Plan for family meeting tomorrow at 1pm      24 Hour Events : Patient hypercarbic yesterday evening due to receiving ativan. He was placed on BiPAP.   Subjective : Patient examined at bedside this morning. He was on HFNL 35L 50% FiO2. He repeatedly kept stating that he wanted to go home. Spoke with patient's wife who agreed that he wants to go home. Explained that if patient goes requiring this much oxygen he will likely end up back in the hospital.     Objective :  Temp:  [97.6 °F (36.4 °C)-98.5 °F (36.9 °C)] 97.9 °F (36.6 °C)  HR:  [65-96] 82  BP: (107-138)/(58-80) 135/77  Resp:  [20-22] 22  SpO2:  [88 %-99 %] 88 %  O2 Device: Other (comment)  Nasal Cannula O2 Flow Rate (L/min):  [35 L/min] 35 L/min    Physical Exam  Vitals reviewed.   Constitutional:       General: He is not in acute distress.     Appearance: He is ill-appearing.      Comments: Thin, cachectic   HENT:      Head: Normocephalic and atraumatic.   Eyes:      Pupils: Pupils are equal, round, and reactive to light.   Cardiovascular:      Rate and Rhythm: Normal rate and regular rhythm.      Heart sounds: Normal heart sounds.   Pulmonary:      Effort: Pulmonary effort is normal.      Breath sounds: No wheezing, rhonchi or rales.      Comments: Clear but diminished  Skin:     General: Skin is warm and dry.      Capillary Refill: Capillary refill takes less than 2 seconds.   Psychiatric:      Comments: Unable to assess. Somnolent           Lab Results: I have reviewed the following results:   .     02/06/25  0520   WBC 5.98   HGB 10.4*   HCT 36.6   PLT 82*   SODIUM 150*   K 4.6      CO2 >45*   BUN 25   CREATININE 0.79   GLUC 93     ABG:   .     02/05/25  1612 02/05/25 2005   PHART 7.284* 7.353   NYG3HYQ 96.7* 82.3*   PO2ART 78.7 68.4*   FYS6FTE 44.8* 44.7*   BEART 14.4 15.9       Labs per my review reveal normal renal function, metabolic alkalosis, stable anemia      RP2 negative     ABG shows chronic compensated hypercapnia and hypoxia     Imaging Results Review: I personally reviewed the following image studies in PACS and associated radiology reports: CT chest. My interpretation of the radiology images/reports is: CT chest per my review shows mild to moderate emphysema, right lower lobe consolidation unchanged, right upper lobe opacity, 8 x 5 mm right middle lobe nodule, mucus in right mainstem bronchus.     PFT Results Reviewed: reviewed and interpreted  PFTs 3/6/2024 per my review shows very severe obstruction FEV1 0.54 L, 25% predicted    Traci Gray MD  Pulmonary & Critical Care Medicine Fellow PGY-4  [facility]

## 2025-02-06 NOTE — PLAN OF CARE
Problem: Potential for Falls  Goal: Patient will remain free of falls  Description: INTERVENTIONS:  - Educate patient/family on patient safety including physical limitations  - Instruct patient to call for assistance with activity   - Consult OT/PT to assist with strengthening/mobility   - Keep Call bell within reach  - Keep bed low and locked with side rails adjusted as appropriate  - Keep care items and personal belongings within reach  - Initiate and maintain comfort rounds  - Make Fall Risk Sign visible to staff  - Offer Toileting every 2 Hours, in advance of need  - Initiate/Maintain bed alarm  - Obtain necessary fall risk management equipment:   - Apply yellow socks and bracelet for high fall risk patients  - Consider moving patient to room near nurses station  Outcome: Progressing     Problem: PAIN - ADULT  Goal: Verbalizes/displays adequate comfort level or baseline comfort level  Description: Interventions:  - Encourage patient to monitor pain and request assistance  - Assess pain using appropriate pain scale  - Administer analgesics based on type and severity of pain and evaluate response  - Implement non-pharmacological measures as appropriate and evaluate response  - Consider cultural and social influences on pain and pain management  - Notify physician/advanced practitioner if interventions unsuccessful or patient reports new pain  Outcome: Progressing     Problem: DISCHARGE PLANNING  Goal: Discharge to home or other facility with appropriate resources  Description: INTERVENTIONS:  - Identify barriers to discharge w/patient and caregiver  - Arrange for needed discharge resources and transportation as appropriate  - Identify discharge learning needs (meds, wound care, etc.)  - Arrange for interpretive services to assist at discharge as needed  - Refer to Case Management Department for coordinating discharge planning if the patient needs post-hospital services based on physician/advanced practitioner  order or complex needs related to functional status, cognitive ability, or social support system  Outcome: Progressing     Problem: Knowledge Deficit  Goal: Patient/family/caregiver demonstrates understanding of disease process, treatment plan, medications, and discharge instructions  Description: Complete learning assessment and assess knowledge base.  Interventions:  - Provide teaching at level of understanding  - Provide teaching via preferred learning methods  Outcome: Progressing     Problem: RESPIRATORY - ADULT  Goal: Achieves optimal ventilation and oxygenation  Description: INTERVENTIONS:  - Assess for changes in respiratory status  - Assess for changes in mentation and behavior  - Position to facilitate oxygenation and minimize respiratory effort  - Oxygen administered by appropriate delivery if ordered  - Initiate smoking cessation education as indicated  - Encourage broncho-pulmonary hygiene including cough, deep breathe, Incentive Spirometry  - Assess the need for suctioning and aspirate as needed  - Assess and instruct to report SOB or any respiratory difficulty  - Respiratory Therapy support as indicated  Outcome: Progressing     Problem: Prexisting or High Potential for Compromised Skin Integrity  Goal: Skin integrity is maintained or improved  Description: INTERVENTIONS:  - Identify patients at risk for skin breakdown  - Assess and monitor skin integrity  - Assess and monitor nutrition and hydration status  - Monitor labs   - Assess for incontinence   - Turn and reposition patient  - Assist with mobility/ambulation  - Relieve pressure over bony prominences  - Avoid friction and shearing  - Provide appropriate hygiene as needed including keeping skin clean and dry  - Evaluate need for skin moisturizer/barrier cream  - Collaborate with interdisciplinary team   - Patient/family teaching  - Consider wound care consult   Outcome: Progressing     Problem: SAFETY,RESTRAINT: NV/NON-SELF DESTRUCTIVE  BEHAVIOR  Goal: Remains free of harm/injury (restraint for non violent/non self-detsructive behavior)  Description: INTERVENTIONS:  - Instruct patient/family regarding restraint use   - Assess and monitor physiologic and psychological status   - Provide interventions and comfort measures to meet assessed patient needs   - Identify and implement measures to help patient regain control  - Assess readiness for release of restraint   Outcome: Progressing  Goal: Returns to optimal restraint-free functioning  Description: INTERVENTIONS:  - Assess the patient's behavior and symptoms that indicate continued need for restraint  - Identify and implement measures to help patient regain control  - Assess readiness for release of restraint   Outcome: Progressing

## 2025-02-06 NOTE — ASSESSMENT & PLAN NOTE
-Very severe COPD, FEV1 0.54L 25% predicted in 2024  -Follows with Dr. Hayward  -Baseline regimen includes all nebulized therapy. Not on chronic azithromycin due to hearing loss  -Presenting with acute exacerbation  -Prednisone 40mg daily, decrease by 10mg every 3 days   -Received 3 days of azithromycin  -Continue nebulized bronchodilators  -Patient will need outpatient pulmonary follow up - appointment scheduled for 3/19

## 2025-02-06 NOTE — ASSESSMENT & PLAN NOTE
-8 x 5 mm right middle lobe nodule, most recently 5 x 3 mm (3/163) and about 4 x 2 mm in January 2024.   -Outpt follow up. Given age and severe co-morbidities, this likely does not need to be followed.   Admission

## 2025-02-06 NOTE — ASSESSMENT & PLAN NOTE
"Patient has history of COPD FEV1 0.54 L 25% protected in 2024.  At baseline requires nebulization therapy, not on azithromycin due to hearing loss.    -Presented with acute exacerbation and altered mentation carbon dioxide retention.  -Continue with steroids, completed azithromycin  -Continue with nebulization.  -Palliative care consulted meeting tomorrow 2/4/25-patient did not wanted to discuss CODE STATUS or goals of care\" refused any further discussion said he will think about it by tomorrow  -Pulmonology on board appreciate recs, require outpatient follow-up appointment scheduled for 3/19  "

## 2025-02-06 NOTE — ASSESSMENT & PLAN NOTE
Wt Readings from Last 3 Encounters:   02/02/25 60.3 kg (132 lb 15 oz)   11/05/24 60.1 kg (132 lb 9.6 oz)   09/20/24 59.3 kg (130 lb 12.8 oz)     Echo on 4/11/2023 reports normal ventricular cavity size with grade 1 diastolic dysfunction.  As per chart review patient takes torsemide 5 mg daily but as per wife and son there was some issues with allergies to furosemide and torsemide.  The family said they will find out the paperwork to know the exact medication with which patient is allergic and then can be restarted tomorrow as family has to still get back

## 2025-02-06 NOTE — PROGRESS NOTES
Progress Note - Nephrology   Name: Severiano Feliciano 89 y.o. male I MRN: [id number]  Unit/Bed#: E4 -01 I Date of Admission: 2/2/2025   Date of Service: 2/6/2025 I Hospital Day: 4    Assessment & Plan  Hypertension  Blood pressures currently acceptable  Pressure lowering medications include losartan 25 mg 2 times daily  Acute on chronic respiratory failure with hypoxia and hypercapnia (HCC)  Currently stable on high flow nasal cannula  Acute exacerbation of chronic obstructive pulmonary disease (COPD) (HCC)  Being treated with prednisone 40 mg daily  Chronic diastolic (congestive) heart failure (HCC)  Not on any diuretic therapy, CT of the chest abdomen pelvis without contrast does not show any overt pulmonary edema  JAY treated with BiPAP  Continue current treatment  Abnormal CT of the chest  Will need outpatient follow-up with pulmonary or primary  Hypernatremia  No restrictions would have him drink free water, discussed with nurse and family to have him try to drink more fluids  He has about a 2.1 L free water deficit if he is able to drink we will allow for him to do this  Will give him another 500 cc of d5w  Repeat BMP tomorrow  Palliative care encounter  Palliative following and meeting tomorrow    I have reviewed the nephrology recommendations including d5w and increasing free water intake, with family, nurse, and we are in agreement with renal plan including the information outlined above. Please contact the SecureChat role for the Nephrology service with any questions/concerns.    Subjective     Patient was seen today  No acute complaints      Objective :  Temp:  [97.6 °F (36.4 °C)-98 °F (36.7 °C)] 98 °F (36.7 °C)  HR:  [65-88] 85  BP: (107-155)/(58-77) 155/74  Resp:  [20-22] 22  SpO2:  [88 %-99 %] 93 %  O2 Device: Other (comment)  Nasal Cannula O2 Flow Rate (L/min):  [35 L/min] 35 L/min    Current Weight: Weight - Scale: 60.3 kg (132 lb 15 oz)  First Weight: Weight - Scale: 60.3 kg (132 lb 15  oz)  I/O         02/04 0701  02/05 0700 02/05 0701  02/06 0700 02/06 0701  02/07 0700    P.O. 300      Total Intake(mL/kg) 300 (5)      Net +300             Unmeasured Urine Occurrence  2 x           Physical Exam  Vitals and nursing note reviewed.   Constitutional:       General: He is not in acute distress.     Appearance: He is well-developed.   HENT:      Head: Normocephalic and atraumatic.   Eyes:      Conjunctiva/sclera: Conjunctivae normal.   Cardiovascular:      Rate and Rhythm: Normal rate and regular rhythm.      Heart sounds: No murmur heard.  Pulmonary:      Effort: Pulmonary effort is normal. No respiratory distress.      Breath sounds: Normal breath sounds.   Abdominal:      Palpations: Abdomen is soft.      Tenderness: There is no abdominal tenderness.   Musculoskeletal:         General: No swelling.      Cervical back: Neck supple.   Skin:     General: Skin is warm and dry.      Capillary Refill: Capillary refill takes less than 2 seconds.   Neurological:      Mental Status: He is alert.   Psychiatric:         Mood and Affect: Mood normal.       Medications:    Current Facility-Administered Medications:     acetaminophen (TYLENOL) tablet 975 mg, 975 mg, Oral, Q8H PRN, Calin Chacon PA-C    aluminum-magnesium hydroxide-simethicone (MAALOX) oral suspension 30 mL, 30 mL, Oral, Q4H PRN, Calin Chacon PA-C    divalproex sodium (DEPAKOTE SPRINKLE) capsule 125 mg, 125 mg, Oral, HS PRN, 125 mg at 02/03/25 2248 **OR** valproate (DEPACON) 125 mg in sodium chloride 0.9 % 50 mL IVPB, 125 mg, Intravenous, HS PRN, Calin Chacon PA-C, Last Rate: 50 mL/hr at 02/02/25 2021, 125 mg at 02/02/25 2021    enoxaparin (LOVENOX) subcutaneous injection 40 mg, 40 mg, Subcutaneous, Daily, Phan Lutz MD, 40 mg at 02/06/25 0831    guaiFENesin (ROBITUSSIN) oral liquid 200 mg, 200 mg, Oral, Q4H PRN, Calin Chacon PA-C    ipratropium (ATROVENT) 0.02 % inhalation solution 0.5 mg, 0.5 mg, Nebulization, TID, Gabi Christiansen DO, 0.5  "mg at 02/06/25 1240    levalbuterol (XOPENEX) inhalation solution 1.25 mg, 1.25 mg, Nebulization, TID, 1.25 mg at 02/06/25 1240 **AND** [DISCONTINUED] sodium chloride 0.9 % inhalation solution 3 mL, 3 mL, Nebulization, TID, Phan Lutz MD, 3 mL at 02/02/25 2014    losartan (COZAAR) tablet 25 mg, 25 mg, Oral, BID, Christina Price MD, 25 mg at 02/06/25 0828    melatonin tablet 3 mg, 3 mg, Oral, HS PRN, Calin Chacon PA-C, 3 mg at 02/04/25 2159    polyethylene glycol (MIRALAX) packet 17 g, 17 g, Oral, Daily PRN, Calin Chacon PA-C    predniSONE tablet 40 mg, 40 mg, Oral, Daily, Traci Gray MD, 40 mg at 02/06/25 0828      Lab Results: I have reviewed the following results:  Results from last 7 days   Lab Units 02/06/25  0520 02/05/25  0547 02/04/25  0638 02/03/25  0501 02/02/25  0538 02/02/25  0456   WBC Thousand/uL 5.98 6.84 5.86 6.47  --  8.13   HEMOGLOBIN g/dL 10.4* 10.8* 10.8* 10.3*  --  11.2*   HEMATOCRIT % 36.6 38.1 37.6 35.7*  --  39.6   PLATELETS Thousands/uL 82* 95* 96* 86*  --  91*   POTASSIUM mmol/L 4.6 3.9 5.2 4.4  --  4.2   CHLORIDE mmol/L 101 101 99 97  --  93*   CO2 mmol/L >45* >45* >45* >45*  --  >45*   BUN mg/dL 25 31* 33* 26*  --  22   CREATININE mg/dL 0.79 0.75 0.83 0.85  --  0.81   CALCIUM mg/dL 9.1 9.1 9.5 9.4  --  10.1   MAGNESIUM mg/dL  --   --   --   --  2.4  --    PHOSPHORUS mg/dL  --   --   --   --  2.4  --    ALBUMIN g/dL  --   --   --   --   --  4.1       Administrative Statements     Portions of the record may have been created with voice recognition software. Occasional wrong word or \"sound a like\" substitutions may have occurred due to the inherent limitations of voice recognition software. Read the chart carefully and recognize, using context, where substitutions have occurred.If you have any questions, please contact the dictating provider.  "

## 2025-02-06 NOTE — ASSESSMENT & PLAN NOTE
Palliative care has been on board   -Family meeting tomorrow with palliative care/pulmonology/internal medicine and case management at 1 PM

## 2025-02-06 NOTE — PLAN OF CARE
Problem: Potential for Falls  Goal: Patient will remain free of falls  Description: INTERVENTIONS:  - Educate patient/family on patient safety including physical limitations  - Instruct patient to call for assistance with activity   - Consult OT/PT to assist with strengthening/mobility   - Keep Call bell within reach  - Keep bed low and locked with side rails adjusted as appropriate  - Keep care items and personal belongings within reach  - Initiate and maintain comfort rounds  - Make Fall Risk Sign visible to staff  - Offer Toileting every 3 Hours, in advance of need  - Initiate/Maintain bed alarm  - Obtain necessary fall risk management equipment: alarm   - Apply yellow socks and bracelet for high fall risk patients  - Consider moving patient to room near nurses station  Outcome: Progressing     Problem: PAIN - ADULT  Goal: Verbalizes/displays adequate comfort level or baseline comfort level  Description: Interventions:  - Encourage patient to monitor pain and request assistance  - Assess pain using appropriate pain scale  - Administer analgesics based on type and severity of pain and evaluate response  - Implement non-pharmacological measures as appropriate and evaluate response  - Consider cultural and social influences on pain and pain management  - Notify physician/advanced practitioner if interventions unsuccessful or patient reports new pain  Outcome: Progressing     Problem: DISCHARGE PLANNING  Goal: Discharge to home or other facility with appropriate resources  Description: INTERVENTIONS:  - Identify barriers to discharge w/patient and caregiver  - Arrange for needed discharge resources and transportation as appropriate  - Identify discharge learning needs (meds, wound care, etc.)  - Arrange for interpretive services to assist at discharge as needed  - Refer to Case Management Department for coordinating discharge planning if the patient needs post-hospital services based on physician/advanced  practitioner order or complex needs related to functional status, cognitive ability, or social support system  Outcome: Progressing     Problem: Knowledge Deficit  Goal: Patient/family/caregiver demonstrates understanding of disease process, treatment plan, medications, and discharge instructions  Description: Complete learning assessment and assess knowledge base.  Interventions:  - Provide teaching at level of understanding  - Provide teaching via preferred learning methods  Outcome: Progressing     Problem: RESPIRATORY - ADULT  Goal: Achieves optimal ventilation and oxygenation  Description: INTERVENTIONS:  - Assess for changes in respiratory status  - Assess for changes in mentation and behavior  - Position to facilitate oxygenation and minimize respiratory effort  - Oxygen administered by appropriate delivery if ordered  - Initiate smoking cessation education as indicated  - Encourage broncho-pulmonary hygiene including cough, deep breathe, Incentive Spirometry  - Assess the need for suctioning and aspirate as needed  - Assess and instruct to report SOB or any respiratory difficulty  - Respiratory Therapy support as indicated  Outcome: Progressing     Problem: Prexisting or High Potential for Compromised Skin Integrity  Goal: Skin integrity is maintained or improved  Description: INTERVENTIONS:  - Identify patients at risk for skin breakdown  - Assess and monitor skin integrity  - Assess and monitor nutrition and hydration status  - Monitor labs   - Assess for incontinence   - Turn and reposition patient  - Assist with mobility/ambulation  - Relieve pressure over bony prominences  - Avoid friction and shearing  - Provide appropriate hygiene as needed including keeping skin clean and dry  - Evaluate need for skin moisturizer/barrier cream  - Collaborate with interdisciplinary team   - Patient/family teaching  - Consider wound care consult   Outcome: Progressing     Problem: SAFETY,RESTRAINT: NV/NON-SELF  DESTRUCTIVE BEHAVIOR  Goal: Remains free of harm/injury (restraint for non violent/non self-detsructive behavior)  Description: INTERVENTIONS:  - Instruct patient/family regarding restraint use   - Assess and monitor physiologic and psychological status   - Provide interventions and comfort measures to meet assessed patient needs   - Identify and implement measures to help patient regain control  - Assess readiness for release of restraint   Outcome: Progressing  Goal: Returns to optimal restraint-free functioning  Description: INTERVENTIONS:  - Assess the patient's behavior and symptoms that indicate continued need for restraint  - Identify and implement measures to help patient regain control  - Assess readiness for release of restraint   Outcome: Progressing

## 2025-02-07 LAB
ANION GAP SERPL CALCULATED.3IONS-SCNC: 5 MMOL/L (ref 4–13)
BACTERIA BLD CULT: NORMAL
BACTERIA BLD CULT: NORMAL
BUN SERPL-MCNC: 20 MG/DL (ref 5–25)
CALCIUM SERPL-MCNC: 9 MG/DL (ref 8.4–10.2)
CHLORIDE SERPL-SCNC: 101 MMOL/L (ref 96–108)
CO2 SERPL-SCNC: 39 MMOL/L (ref 21–32)
CREAT SERPL-MCNC: 0.7 MG/DL (ref 0.6–1.3)
ERYTHROCYTE [DISTWIDTH] IN BLOOD BY AUTOMATED COUNT: 12.9 % (ref 11.6–15.1)
GFR SERPL CREATININE-BSD FRML MDRD: 84 ML/MIN/1.73SQ M
GLUCOSE SERPL-MCNC: 89 MG/DL (ref 65–140)
HCT VFR BLD AUTO: 35 % (ref 36.5–49.3)
HGB BLD-MCNC: 10.1 G/DL (ref 12–17)
MCH RBC QN AUTO: 32.5 PG (ref 26.8–34.3)
MCHC RBC AUTO-ENTMCNC: 28.9 G/DL (ref 31.4–37.4)
MCV RBC AUTO: 113 FL (ref 82–98)
PLATELET # BLD AUTO: 87 THOUSANDS/UL (ref 149–390)
PMV BLD AUTO: 10.3 FL (ref 8.9–12.7)
POTASSIUM SERPL-SCNC: 4 MMOL/L (ref 3.5–5.3)
RBC # BLD AUTO: 3.11 MILLION/UL (ref 3.88–5.62)
SODIUM SERPL-SCNC: 145 MMOL/L (ref 135–147)
WBC # BLD AUTO: 6.26 THOUSAND/UL (ref 4.31–10.16)

## 2025-02-07 PROCEDURE — 99498 ADVNCD CARE PLAN ADDL 30 MIN: CPT

## 2025-02-07 PROCEDURE — 94762 N-INVAS EAR/PLS OXIMTRY CONT: CPT

## 2025-02-07 PROCEDURE — 99232 SBSQ HOSP IP/OBS MODERATE 35: CPT | Performed by: INTERNAL MEDICINE

## 2025-02-07 PROCEDURE — 99232 SBSQ HOSP IP/OBS MODERATE 35: CPT

## 2025-02-07 PROCEDURE — 97163 PT EVAL HIGH COMPLEX 45 MIN: CPT

## 2025-02-07 PROCEDURE — 94760 N-INVAS EAR/PLS OXIMETRY 1: CPT

## 2025-02-07 PROCEDURE — 99497 ADVNCD CARE PLAN 30 MIN: CPT

## 2025-02-07 PROCEDURE — 80048 BASIC METABOLIC PNL TOTAL CA: CPT

## 2025-02-07 PROCEDURE — 94003 VENT MGMT INPAT SUBQ DAY: CPT

## 2025-02-07 PROCEDURE — 99233 SBSQ HOSP IP/OBS HIGH 50: CPT

## 2025-02-07 PROCEDURE — 97167 OT EVAL HIGH COMPLEX 60 MIN: CPT

## 2025-02-07 PROCEDURE — 94640 AIRWAY INHALATION TREATMENT: CPT

## 2025-02-07 PROCEDURE — 85027 COMPLETE CBC AUTOMATED: CPT

## 2025-02-07 RX ORDER — AMLODIPINE BESYLATE 5 MG/1
5 TABLET ORAL DAILY
Status: DISCONTINUED | OUTPATIENT
Start: 2025-02-07 | End: 2025-02-08 | Stop reason: HOSPADM

## 2025-02-07 RX ORDER — LOSARTAN POTASSIUM 25 MG/1
25 TABLET ORAL 2 TIMES DAILY
Status: DISCONTINUED | OUTPATIENT
Start: 2025-02-07 | End: 2025-02-08 | Stop reason: HOSPADM

## 2025-02-07 RX ADMIN — PREDNISONE 40 MG: 20 TABLET ORAL at 08:17

## 2025-02-07 RX ADMIN — LEVALBUTEROL HYDROCHLORIDE 1.25 MG: 1.25 SOLUTION RESPIRATORY (INHALATION) at 13:50

## 2025-02-07 RX ADMIN — LEVALBUTEROL HYDROCHLORIDE 1.25 MG: 1.25 SOLUTION RESPIRATORY (INHALATION) at 07:27

## 2025-02-07 RX ADMIN — IPRATROPIUM BROMIDE 0.5 MG: 0.5 SOLUTION RESPIRATORY (INHALATION) at 13:50

## 2025-02-07 RX ADMIN — LEVALBUTEROL HYDROCHLORIDE 1.25 MG: 1.25 SOLUTION RESPIRATORY (INHALATION) at 19:41

## 2025-02-07 RX ADMIN — AMLODIPINE BESYLATE 5 MG: 5 TABLET ORAL at 11:38

## 2025-02-07 RX ADMIN — ENOXAPARIN SODIUM 40 MG: 40 INJECTION SUBCUTANEOUS at 08:17

## 2025-02-07 RX ADMIN — LOSARTAN POTASSIUM 25 MG: 25 TABLET, FILM COATED ORAL at 08:17

## 2025-02-07 RX ADMIN — IPRATROPIUM BROMIDE 0.5 MG: 0.5 SOLUTION RESPIRATORY (INHALATION) at 19:41

## 2025-02-07 RX ADMIN — IPRATROPIUM BROMIDE 0.5 MG: 0.5 SOLUTION RESPIRATORY (INHALATION) at 07:27

## 2025-02-07 NOTE — ASSESSMENT & PLAN NOTE
Palliative diagnoses: COPD, CHF  Outpatient Palliative provider: Known to palliative medicine through previous admissions.  No outpatient follow-up has been established    Goals of care  Level 1 code status  Disease focused care with no limits in place.   Family would like for patient to return home and be able to return to the hospital again.     Social support provided :  adult children support patient  Patient prefers only focus on positive things, family supports this  Supportive listening provided  Normalized experience of patient/family  Provided anxiety containment  Provided anticipatory guidance  Living situation -patient lives with his wife and 2 of his children, son Renny and daughter Rocio    Follow up  Palliative Care will continue to follow in hopes of building a trusting relationship with patient and having more effective Go[facility] conversations in coming days.  Please reach out via [employer] Secure Chat if questions or concerns arise.    I have reviewed the patient's controlled substance dispensing history in the Prescription Drug Monitoring Program in compliance with the [facility] regulations before prescribing any controlled substances.  Last refills  No opioid or benzo refills in [place] over past year.    Decisional apparatus:  Patient lacks capacity on exam today.  Patient's substitute decision maker would default to spouse Viridiana Zambrano by [place] 169.  Will need to investigate family dynamics at family meeting tomorrow as patient's spouse has a different last name than patient and children  Advance Directive/Living Will, POLST and POA Forms: None on file  ER contacts:  Name Relation Home Work Mobile   Viridiana Zambrano Spouse [phone]   Severiano,Julio Son   [phone]   Jim Patino Grandparent   [phone]   Renny Fernandez Son [phone]   Rocio Fernandez Daughter   [phone]     We appreciate the invitation to be involved in this patient's care.  We will continue to  follow throughout this hospitalization.  Please do not hesitate to reach our on call provider through our clinic answering service at [phone] should you have acute symptom control concerns.    Ana Jacob MSN, CRNP,   Palliative and Supportive Care  Clinic/Answering Service: [phone]  You can find me on [employer] Secure Chat

## 2025-02-07 NOTE — ASSESSMENT & PLAN NOTE
-Very severe COPD, FEV1 0.54L 25% predicted in 2024  -Follows with Dr. Hayward  -Baseline regimen includes all nebulized therapy. Not on chronic azithromycin due to hearing loss  -Presented with acute exacerbation  -Prednisone 40mg daily, decrease by 10mg every 3 days   -Received 3 days of azithromycin  -Continue nebulized bronchodilators  -Continue to follow with pulmonary as outpatient - appointment scheduled for 3/19

## 2025-02-07 NOTE — PHYSICAL THERAPY NOTE
PT EVALUATION    Pt. Name: Severiano Feliciano  Pt. Age: 89 y.o.  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    Anemia     Aneurysm, aorta, thoracic (HCC)     Appendicolith     Ascending aortic aneurysm (HCC)     3.7    Asthma     BPH (benign prostatic hyperplasia)     CAD (coronary artery disease)     noted on CT scan    CHF (congestive heart failure) (HCC)     COPD (chronic obstructive pulmonary disease) (HCC)     Delirium 04/25/2024    Descending thoracic aortic aneurysm (HCC)     Diabetes mellitus (HCC)     Diverticulosis     Former tobacco use     GERD (gastroesophageal reflux disease)     History of DVT (deep vein thrombosis)     Left leg    History of transfusion     Hypertension     Hypoxemia 04/30/2023    Inguinal hernia     right    Nephrolithiasis     Oxygen dependent     2LNC    Oxygen dependent     Pneumonia     Pre-diabetes     Prostate calculus     PVD (peripheral vascular disease) (HCC)     Recurrent right inguinal hernia w incarcertion 01/04/2023    SIRS (systemic inflammatory response syndrome) (HCC) 09/04/2024    Solitary pulmonary nodule on lung CT 02/02/2025    8 x 5 mm right middle lobe nodule, most recently 5 x 3 mm (3/163) and about 4 x 2 mm in January 2024.       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)     Varicose vein of leg     b/l       Past Surgical  History:   Procedure Laterality Date    CARDIAC SURGERY      ESOPHAGOGASTRODUODENOSCOPY      HERNIA REPAIR Right 1/21/2019    Procedure: REPAIR HERNIA INGUINAL WITH MESH;  Surgeon: David Lowry MD;  Location: [facility];  Service: General    HERNIA REPAIR Right 7/22/2023    Procedure: REPAIR RECURRENT INCARERATED HERNIA INGUINAL OPEN, RIGHT ORCHIECTOMY;  Surgeon: Mason Bertrand MD;  Location: [facility];  Service: General    INGUINAL HERNIA REPAIR Bilateral     IR TEVAR  12/27/2018    CT EVASC RPR DTA COVERAGE ART ORIGIN 1ST ENDOPROSTH N/A 12/27/2018    Procedure: TEVAR - endovascular thoracic aortic aneurysm repair;  Surgeon: Gladis Ortega MD;  Location: [facility];  Service: Vascular    THORACIC AORTIC ANEURYSM REPAIR  12/27/2018    VARICOSE VEIN SURGERY Bilateral     vein stripping       Imaging Studies:  CT head without contrast   Final Result by Shaka Elizabeth DO (02/02 0741)      No acute intracranial abnormality.  Chronic microangiopathic changes.      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               Workstation performed: [id number]         CT spine cervical without contrast   Final Result by Shaka Elizabeth DO (02/02 0747)      No cervical spine fracture or traumatic malalignment.                  Workstation performed: [id number]         CT chest abdomen pelvis w contrast   Final Result by Shaka Elizabeth DO (02/02 0829)      1. Patchy opacity posterolateral right upper lobe could represent atelectasis or pneumonia. Little change in chronic right lower lobe consolidation.      2. Background emphysema. Slowly enlarging 8 mm right middle lobe nodule. Possibility of neoplasm cannot be excluded. Based on current Fleischner Society 2017 Guidelines on incidental pulmonary nodule, follow-up non-contrast CT is recommended at 6 months    from the initial examination and, if stable at that time, an additional follow-up is recommended for 18-24 months from the  initial examination.      3. Thickening of the urinary bladder with hyperemia involving posterior lateral bladder diverticulum. Correlate with urinalysis regarding cystitis.      4. 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 No change in aneurysmal dilatation junction distal ascending aorta/arch.      5. Question thickening of the proximal esophagus. Consider esophagram.      6. Diffuse colonic diverticulosis without diverticulitis. Moderate to large amount of colonic stool suggesting constipation.      7. Cholelithiasis.      Additional incidental findings as above.      The study was marked in EPIC for follow-up.            Workstation performed: [id number]         XR chest portable   ED Interpretation by Puneet East MD (02/02 0619)   Right sided vascular congestion.  No pleural effusion or focal infiltrate      Final Result by Kelsy Rivera MD (02/02 1105)      Mild pulmonary venous congestion.      Moderate opacity in the right lower lobe. See subsequent chest CT.      Emphysema.            Workstation performed: [id number]               02/07/25 0920   PT Last Visit   PT Visit Date 02/07/25   Note Type   Note type Evaluation   Pain Assessment   Pain Assessment Tool FLACC   Pain Score No Pain   Pain Rating: FLACC (Rest) - Face 0   Pain Rating: FLACC (Rest) - Legs 0   Pain Rating: FLACC (Rest) - Activity 0   Pain Rating: FLACC (Rest) - Cry 0   Pain Rating: FLACC (Rest) - Consolability 0   Score: FLACC (Rest) 0   Pain Rating: FLACC (Activity) - Face 0   Pain Rating: FLACC (Activity) - Legs 0   Pain Rating: FLACC (Activity) - Activity 0   Pain Rating: FLACC (Activity) - Cry 0   Pain Rating: FLACC (Activity) - Consolability 0   Score: FLACC (Activity) 0   Restrictions/Precautions   Weight Bearing Precautions Per Order No   Other Precautions Cognitive;Chair Alarm;Bed Alarm;Fall Risk;Multiple lines  (35L HFNC)   Home Living    Type of Home House   Home Layout Two level;1/2 bath on main level;Bed/bath upstairs;Stairs to enter with rails  ((+)SHYANNE but pt & wife unable to quantify)   Bathroom Shower/Tub Tub/shower unit   Bathroom Toilet Standard   Home Equipment Walker;Cane;Wheelchair-manual;Other (Comment)  (home O2 3L at baseline)   Additional Comments pt poor historian; above information from chart & from wife via  services; of note, wife is limited historian as well hence ?accuracy   Prior Function   Level of [place] Needs assistance with ADLs;Needs assistance with functional mobility;Needs assistance with IADLS   Lives With Spouse;Son   Receives Help From Family  (son is the paid caregiver x 8hrs daily)   Falls in the last 6 months 0  (pt's wife denies falls)   Vocational Retired   Comments pt poor historian; above information from chart & from wife via  services; of note, wife is limited historian as well hence ?accuracy   General   Family/Caregiver Present Yes  (wife)   Cognition   Overall Cognitive Status Impaired   Arousal/Participation Alert   Attention Attends with cues to redirect   Orientation Level Oriented to person;Disoriented to place;Disoriented to time;Disoriented to situation   Following Commands Follows one step commands with increased time or repetition   Comments cooperative; pt [place] speaking only; translation via [employer]  services but w/ limited success;  #[id number]   Subjective   Subjective Pt agreeable to PT/OT marcela.   RUE Assessment   RUE Assessment   (refer to OT)   LUE Assessment   LUE Assessment   (refer to OT)   RLE Assessment   RLE Assessment WFL  (as observed functionally; pt unable to follow MMT commands)   LLE Assessment   LLE Assessment WFL  (as observed functionally; pt unable to follow MMT commands)   Bed Mobility   Supine to Sit Unable to assess   Sit to Supine Unable to assess   Additional Comments pt OOB in chair pre & post-session   Transfers    Sit to Stand 4  Minimal assistance   Additional items Assist x 1;Armrests;Increased time required;Verbal cues   Stand to Sit 4  Minimal assistance   Additional items Assist x 1;Armrests;Increased time required;Verbal cues   Additional Comments w/ RW; cues for techniques & safety   Ambulation/Elevation   Gait pattern Forward Flexion;Wide RICHARD;Decreased foot clearance;Short stride;Step to;Excessively slow;Decreased heel strike;Decreased toe off   Gait Assistance 4  Minimal assist   Additional items Assist x 1;Verbal cues;Tactile cues   Assistive Device Rolling walker   Distance 20'x1   Ambulation/Elevation Additional Comments unsteady gait but no gross LOB noted; dec amb tolerance 2* to weakness & fatigue; SpO2 81% w/ 35L HFNC during amb   Balance   Static Sitting Fair +   Dynamic Sitting Fair   Static Standing Fair -  (w/ RW)   Dynamic Standing Poor +  (w/ RW)   Ambulatory Poor +  (w/ RW)   Endurance Deficit   Endurance Deficit Yes   Endurance Deficit Description weakness; fatigue   Activity Tolerance   Activity Tolerance Patient limited by fatigue;Treatment limited secondary to medical complications (Comment)   Medical Staff Made Aware OTR [place]   Nurse Made Aware yesRei   Assessment   Prognosis Fair   Problem List Decreased strength;Decreased endurance;Impaired balance;Decreased mobility;Decreased cognition;Impaired judgement;Decreased safety awareness   Assessment Pt. 89 y.o.male admitted for Altered mental status Acute on Chronic respiratory failure with hypoxia and hypercapnia; COPD exacerbation; pulmonary nodules; hypernatremia.  Pt referred to PT for mobility assessment & D/C planning. Please see above for information re: home set-up & PLOF as well as objective findings during PT assessment. On eval, pt functioning below baseline hence will continue skilled PT to improve function & safety. Pt require minAx1 for transfers & amb w/ RW + cues for techniques & safety. Gait deviations as above but no gross LOB  "noted. Dec amb tolerance 2* to weakness & fatigue. Pt on 35L HFNC w/ resting SpO2 88-89%. During amb, SpO2 dropped to 81% w/ 35L HFNC. The patient's AM-PAC Basic Mobility Inpatient Short Form Raw Score is 15. A Raw score of less than or equal to 16 suggests the patient may benefit from discharge to post-acute rehabilitation services. Please also refer to the recommendation of the Physical Therapist for safe discharge planning. From PT standpoint, will recommend Level II (moderate resource intensity) rehab services at D/C. No SOB & dizziness reported t/o session. Nsg staff most recent vital signs as follows: /92 (BP Location: Right arm)   Pulse 89   Temp 98 °F (36.7 °C) (Temporal)   Resp 20   Ht 5' 2.99\" (1.6 m)   Wt 60.3 kg (132 lb 15 oz)   SpO2 98%   BMI 23.55 kg/m² . At end of session, pt remain OOB in chair in stable condition, call bell & phone in reach, chair alarm activated, all lines intact. Fall precautions reinforced w/ good understanding. CM to follow. Nsg staff to continue to mobilized pt (OOB in chair for all meals & ambulate in room) as tolerated to prevent further decline in function. Will also recommend [facility] for daily amb &/or daily OOB in chair as appropriate. Nsg notified. Co-eval was necessary to complete this PT eval for the pt's best interest given pt's medical acuity & complexity.   Barriers to Discharge Inaccessible home environment   Barriers to Discharge Comments (+) stairs   Goals   Patient Goals none stated 2* to impaired cognition   STG Expiration Date 02/21/25   Short Term Goal #1 Goals to be met in 14 days; pt will be able to: 1) inc strength & balance by 1/2 grade to improve overall functional mobility & dec fall risk; 2) inc bed mobility to S for pt to be able to get in/OOB safely w/ proper techniques 100% of the time, to dec caregiver burden & safely function at home; 3) inc transfers to S for pt to transition safely from one surface to another w/o % of the " time, to dec caregiver burden & safely function at home; 4) inc amb w/ RW approx. >150' w/ S for pt to ambulate household distances w/o any % of the time, to dec caregiver burden & safely function at home; 5) negotiate stairs w/ S for inc safety during stair mgt inside/outside of home & dec caregiver burden; 6) pt/caregiver ed   PT Treatment Day 0   Plan   Treatment/Interventions Functional transfer training;LE strengthening/ROM;Elevations;Therapeutic exercise;Endurance training;Patient/family training;Bed mobility;Gait training;Spoke to nursing;OT   PT Frequency 3-5x/wk   Discharge Recommendation   Rehab Resource Intensity Level, PT II (Moderate Resource Intensity)   Equipment Recommended Walker   AM-PAC Basic Mobility Inpatient   Turning in Flat Bed Without Bedrails 3   Lying on Back to Sitting on Edge of Flat Bed Without Bedrails 2   Moving Bed to Chair 3   Standing Up From Chair Using Arms 3   Walk in Room 3   Climb 3-5 Stairs With Railing 1   Basic Mobility Inpatient Raw Score 15   Basic Mobility Standardized Score 36.97   [facility] Highest Level Of Mobility   -[facility] Goal 4: Move to chair/commode   -[facility] Achieved 4: Move to chair/commode   End of Consult   Patient Position at End of Consult Bedside chair;Bed/Chair alarm activated;All needs within reach   End of Consult Comments Pt in stable condition. All needs in reach. All lines intact. Nori alarm activated.   Hx/personal factors: co-morbidities, inaccessible home, dec caregiver support, advanced age, mutliple lines, use of AD, dec cognition, fall risk, assist w/ ADL's, and O2  Examination: dec mobility, dec balance, dec endurance, dec amb, risk for falls, dec cognition  Clinical: unpredictable (ongoing medical status, abnormal lab values, and risk for falls)  Complexity: high    Kurtis Rockwell

## 2025-02-07 NOTE — PROGRESS NOTES
Progress Note - Nephrology   Name: Severiano Feliciano 89 y.o. male I MRN: [id number]  Unit/Bed#: E4 -01 I Date of Admission: 2/2/2025   Date of Service: 2/7/2025 I Hospital Day: 5     Assessment & Plan  Hypertension  Blood pressures slightly elevated in the 170s yesterday was quick  Pressure lowering medications include losartan 25 mg 2 times daily  I will add amlodipine 5 mg daily for synergy with the losartan  Hypernatremia  Resolved at 145 allow water intake hold diuretics for now  Chronic diastolic (congestive) heart failure (HCC)  Not on any diuretic therapy, CT of the chest abdomen pelvis without contrast does not show any overt pulmonary edema: Holding any diuretics at the moment  Acute on chronic respiratory failure with hypoxia and hypercapnia (HCC)  Per primary service  Acute exacerbation of chronic obstructive pulmonary disease (COPD) (HCC)  Being treated with prednisone 40 mg daily: Per primary service    I have reviewed the nephrology recommendations including adjusting antihypertensive regimen with amlodipine, with SLIM, and we are in agreement with renal plan including the information outlined above.     Subjective   Brief History of Admission -we are seeing for hypertension and hypernatremia    Minimal shortness of breath seems to be improving, no chest pain  No nausea vomiting or diarrhea  Urinating well    Objective :  Temp:  [97.5 °F (36.4 °C)-98.3 °F (36.8 °C)] 98.1 °F (36.7 °C)  HR:  [71-98] 83  BP: (136-178)/(70-96) 178/90  Resp:  [20-22] 20  SpO2:  [93 %-98 %] 95 %  O2 Device: High flow nasal cannula  Nasal Cannula O2 Flow Rate (L/min):  [35 L/min] 35 L/min  FiO2 (%):  [45] 45    Current Weight: Weight - Scale: 60.3 kg (132 lb 15 oz)  First Weight: Weight - Scale: 60.3 kg (132 lb 15 oz)  I/O         02/05 0701  02/06 0700 02/06 0701 02/07 0700 02/07 0701 02/08 0700    P.O.       Total Intake(mL/kg)       Urine (mL/kg/hr)  100 (0.1)     Total Output  100     Net  -100             Unmeasured Urine Occurrence 2 x 1 x           Physical Exam  Physical Exam: General:  No acute distress  Skin:  No acute rash  Eyes:  No scleral icterus and noninjected  ENT:  Moist mucous membranes  Neck:  Supple, no jugular venous distention, trachea midline, overall appearance is normal  Chest:  Clear to auscultation except for some mild rhonchi at the right base  CVS:  Regular rate and rhythm, without a rub or gallops  Abdomen:  Normal bowel sounds, soft and nontender and nondistended  Extremities:  No edema, and no cyanosis, no significant arthritic changes  Neuro:  No gross focality  Psych:  Alert and oriented and appropriate    Medications:    Current Facility-Administered Medications:     acetaminophen (TYLENOL) tablet 975 mg, 975 mg, Oral, Q8H PRN, Calin Chacon PA-C    aluminum-magnesium hydroxide-simethicone (MAALOX) oral suspension 30 mL, 30 mL, Oral, Q4H PRN, Calin Chacon PA-C    divalproex sodium (DEPAKOTE SPRINKLE) capsule 125 mg, 125 mg, Oral, HS PRN, 125 mg at 02/03/25 2248 **OR** valproate (DEPACON) 125 mg in sodium chloride 0.9 % 50 mL IVPB, 125 mg, Intravenous, HS PRN, Calin Chacon PA-C, Last Rate: 50 mL/hr at 02/02/25 2021, 125 mg at 02/02/25 2021    enoxaparin (LOVENOX) subcutaneous injection 40 mg, 40 mg, Subcutaneous, Daily, Phan Lutz MD, 40 mg at 02/07/25 0817    guaiFENesin (ROBITUSSIN) oral liquid 200 mg, 200 mg, Oral, Q4H PRN, Calin Chacon PA-C    ipratropium (ATROVENT) 0.02 % inhalation solution 0.5 mg, 0.5 mg, Nebulization, TID, Gabi Christiansen DO, 0.5 mg at 02/07/25 0727    levalbuterol (XOPENEX) inhalation solution 1.25 mg, 1.25 mg, Nebulization, TID, 1.25 mg at 02/07/25 0727 **AND** [DISCONTINUED] sodium chloride 0.9 % inhalation solution 3 mL, 3 mL, Nebulization, TID, Phan Lutz MD, 3 mL at 02/02/25 2014    losartan (COZAAR) tablet 25 mg, 25 mg, Oral, BID, Christina Price MD, 25 mg at 02/07/25 0817    melatonin tablet 3 mg, 3 mg, Oral, HS PRN, Calin Chacon PA-C, 3 mg at  "02/04/25 2159    polyethylene glycol (MIRALAX) packet 17 g, 17 g, Oral, Daily PRN, Calin Chacon PA-C    predniSONE tablet 40 mg, 40 mg, Oral, Daily, Traci Gray MD, 40 mg at 02/07/25 0817      Lab Results: I have reviewed the following results:  Results from last 7 days   Lab Units 02/07/25  0542 02/06/25  0520 02/05/25  0547 02/04/25  0638 02/03/25  0501 02/02/25  0538 02/02/25  0456   WBC Thousand/uL 6.26 5.98 6.84 5.86 6.47  --  8.13   HEMOGLOBIN g/dL 10.1* 10.4* 10.8* 10.8* 10.3*  --  11.2*   HEMATOCRIT % 35.0* 36.6 38.1 37.6 35.7*  --  39.6   PLATELETS Thousands/uL 87* 82* 95* 96* 86*  --  91*   POTASSIUM mmol/L 4.0 4.6 3.9 5.2 4.4  --  4.2   CHLORIDE mmol/L 101 101 101 99 97  --  93*   CO2 mmol/L 39* >45* >45* >45* >45*  --  >45*   BUN mg/dL 20 25 31* 33* 26*  --  22   CREATININE mg/dL 0.70 0.79 0.75 0.83 0.85  --  0.81   CALCIUM mg/dL 9.0 9.1 9.1 9.5 9.4  --  10.1   MAGNESIUM mg/dL  --   --   --   --   --  2.4  --    PHOSPHORUS mg/dL  --   --   --   --   --  2.4  --    ALBUMIN g/dL  --   --   --   --   --   --  4.1       Administrative Statements     Portions of the record may have been created with voice recognition software. Occasional wrong word or \"sound a like\" substitutions may have occurred due to the inherent limitations of voice recognition software. Read the chart carefully and recognize, using context, where substitutions have occurred.If you have any questions, please contact the dictating provider.  "

## 2025-02-07 NOTE — ASSESSMENT & PLAN NOTE
Blood pressures slightly elevated in the 170s yesterday was quick  Pressure lowering medications include losartan 25 mg 2 times daily  I will add amlodipine 5 mg daily for synergy with the losartan

## 2025-02-07 NOTE — ASSESSMENT & PLAN NOTE
Palliative care has been on board   Family meeting today at 1 PM with all the children and wife.  Patient's condition along with end-stage COPD and expectations were discussed.  CODE STATUS change was also discussed.  Patient's family wanted to see if he can decrease his oxygen down and how he does, it was reiterated that he does not look like he would perform well but patient's family wanted to see if he can tolerate 3 to 4 L currently planning to transition patient to mid flow and then see if patient can tolerate further go down.

## 2025-02-07 NOTE — PROGRESS NOTES
Progress Note - Palliative Care   Name: Severiano Feliciano 89 y.o. male I MRN: [id number]  Unit/Bed#: E4 -01 I Date of Admission: 2/2/2025   Date of Service: 2/7/2025 I Hospital Day: 5    Assessment & Plan  Acute on chronic respiratory failure with hypoxia and hypercapnia (HCC)  -At baseline, on 3L supplemental oxygen with exertion  -Currently on HFNC 35L, 45% FiO2  -Continue to wean supplemental oxygen to maintain SpO2 > 88%  -Encourage OOB as tolerated, incentive spirometry  -Ambulatory O2 evaluation prior to discharge  Acute exacerbation of chronic obstructive pulmonary disease (COPD) (HCC)  very severe COPD, FEV1 0.54L 25% predicted in 2024  IV steroids  Nebulized bronchodilators  3L supplemental oxygen with exertion  Titrate off oxygen as able  Chronic diastolic (congestive) heart failure (HCC)  Wt Readings from Last 3 Encounters:   02/02/25 60.3 kg (132 lb 15 oz)   11/05/24 60.1 kg (132 lb 9.6 oz)   09/20/24 59.3 kg (130 lb 12.8 oz)   Weight stable, diurese per primary team           Palliative care encounter  Palliative diagnoses: COPD, CHF  Outpatient Palliative provider: Known to palliative medicine through previous admissions.  No outpatient follow-up has been established    Goals of care  Level 1 code status  Disease focused care with no limits in place.   Family would like for patient to return home and be able to return to the hospital again.     Social support provided :  adult children support patient  Patient prefers only focus on positive things, family supports this  Supportive listening provided  Normalized experience of patient/family  Provided anxiety containment  Provided anticipatory guidance  Living situation -patient lives with his wife and 2 of his children, son Renny and daughter Rocio    Follow up  Palliative Care will continue to follow in hopes of building a trusting relationship with patient and having more effective GoC conversations in coming days.  Please reach out via Epic  Secure Chat if questions or concerns arise.    I have reviewed the patient's controlled substance dispensing history in the Prescription Drug Monitoring Program in compliance with the [facility] regulations before prescribing any controlled substances.  Last refills  No opioid or benzo refills in [place] over past year.    Decisional apparatus:  Patient lacks capacity on exam today.  Patient's substitute decision maker would default to spouse Viridiana Zambrano by [place] Act 169.  Will need to investigate family dynamics at family meeting tomorrow as patient's spouse has a different last name than patient and children  Advance Directive/Living Will, POLST and POA Forms: None on file  ER contacts:  Name Relation Home Work Mobile   Viridiana Zambrano Spouse [phone]   Severiano,Julio Son   [phone]   Jim Patino Grandparent   [phone]   Renny Fernandez Son [phone]   Rocio Fernandez Daughter   [phone]     We appreciate the invitation to be involved in this patient's care.  We will continue to follow throughout this hospitalization.  Please do not hesitate to reach our on call provider through our clinic answering service at [phone] should you have acute symptom control concerns.    Ana Jacob, MSN, VYNP,   Palliative and Supportive Care  Clinic/Answering Service: [phone]  You can find me on Epic Secure Chat       24 hour history  Chart reviewed prior to visit  No events overnight  Patient sitting in chair. NAD. Appears comfortable. Family members present at the bedside.     PALLIATIVE AND SUPPORTIVE CARE FAMILY CONFERENCE:     Time of Meetin:00 - 2:00     Participants: Patients family members: Spouse, Renny Bocanegra, SaqibClarisse Florian ( grandson).  Pulmonology team- Dr. Hayward and Dr. Marina MARC- Dr. Price  - Justino Diaz  [facility] Team- Jennifer Bloch CRNP, Ana GARCÍA     Patient Participation: Patient was not an active participant in the meeting. He is  unable to make his own medical decisions. Family supportive and will aid in making medical decisions for patient.     Patient Support System: Family     Meeting Location: Unit conference room       A family meeting was necessary to allow for thorough discussion regarding patient's clinical presentation, expected disease trajectory, and comfort care versus continuing disease directed therapy in the setting of End Stage COPD. Comfort care and hospice have been thoroughly explained. Family had questions related to medical diagnoses, prognosis, comfort care, hospice services and coverage and all questions were answered. Family at this time is requesting disease directed cares.  They endorse that patient would like to come home. Spouse would like to be able to take patient home and bring patient back to the hospital if needed.    Code Status discussed at length.   Currently a level 1.  Explained CPR and intubation in depth and discussed likelihood of survival. At this time family would like to discuss among themselves.    Family lacks insight and understanding of patients current condition and progressive disease.       Objective :  Temp:  [97.5 °F (36.4 °C)-98.3 °F (36.8 °C)] 97.5 °F (36.4 °C)  HR:  [71-98] 83  BP: (135-172)/(70-96) 172/96  Resp:  [22] 22  SpO2:  [88 %-98 %] 93 %  O2 Device: High flow nasal cannula  Nasal Cannula O2 Flow Rate (L/min):  [35 L/min] 35 L/min  FiO2 (%):  [45] 45    Physical Exam  Vitals and nursing note reviewed.   Constitutional:       General: He is awake. He is not in acute distress.  HENT:      Head: Normocephalic and atraumatic.   Cardiovascular:      Rate and Rhythm: Normal rate.   Pulmonary:      Effort: Pulmonary effort is normal. No respiratory distress.      Comments: HFNC in place  Musculoskeletal:      Cervical back: Normal range of motion.   Skin:     General: Skin is warm and dry.   Neurological:      Mental Status: He is alert. He is disoriented.            Lab Results: I  have reviewed the following results:  Lab Results   Component Value Date/Time    SODIUM 145 02/07/2025 05:42 AM    SODIUM 142 04/19/2023 03:16 PM    K 4.0 02/07/2025 05:42 AM    K 5.5 (H) 04/19/2023 03:16 PM    BUN 20 02/07/2025 05:42 AM    BUN 25 04/19/2023 03:16 PM    CREATININE 0.70 02/07/2025 05:42 AM    CREATININE 0.97 04/19/2023 03:16 PM    GLUC 89 02/07/2025 05:42 AM    GLUC 153 (H) 04/19/2023 03:16 PM    CALCIUM 9.0 02/07/2025 05:42 AM    CALCIUM 8.7 04/19/2023 03:16 PM    AST 23 02/02/2025 04:56 AM    AST 24 04/19/2023 04:51 AM    ALT 13 02/02/2025 04:56 AM    ALT 21 04/19/2023 04:51 AM    ALB 4.1 02/02/2025 04:56 AM    ALB 3.4 (L) 04/19/2023 04:51 AM    TP 7.6 02/02/2025 04:56 AM    TP 6.0 (L) 04/19/2023 04:51 AM    EGFR 84 02/07/2025 05:42 AM    EGFR 77 04/19/2023 03:16 PM    EGFR 57 (L) 05/28/2020 08:00 PM     Lab Results   Component Value Date/Time    HGB 10.1 (L) 02/07/2025 05:42 AM    HGB 14.2 03/01/2014 04:55 AM    WBC 6.26 02/07/2025 05:42 AM    WBC 13.51 (H) 03/01/2014 04:55 AM    PLT 87 (L) 02/07/2025 05:42 AM     03/01/2014 04:55 AM    INR 1.04 02/02/2025 04:56 AM    INR 1.1 05/28/2020 08:00 PM    PTT 26 02/02/2025 04:56 AM     Lab Results   Component Value Date/Time    [id number] 0.258 (L) 04/25/2024 06:46 AM       Code Status: Level 1 - Full Code  Advance Directive and Living Will:      Power of :    POLST:      Administrative Statements   I have spent a total time of 90 minutes in caring for this patient on the day of the visit/encounter including Diagnostic results, Prognosis, Risks and benefits of tx options, Instructions for management, Patient and family education, Importance of tx compliance, Risk factor reductions, Impressions, Counseling / Coordination of care, Documenting in the medical record, Reviewing / ordering tests, medicine, procedures  , Obtaining or reviewing history  , and Communicating with other healthcare professionals . Topics discussed with the patient /  family include symptom assessment and management, psychosocial support, goals of care, hospice services, supportive listening, and anticipatory guidance.

## 2025-02-07 NOTE — ASSESSMENT & PLAN NOTE
-At baseline, on 3L supplemental oxygen with exertion  -Currently on HFNC 35L, 45% FiO2  -Continue to wean supplemental oxygen to maintain SpO2 > 88%  -Encourage OOB as tolerated, incentive spirometry  -Ambulatory O2 evaluation prior to discharge

## 2025-02-07 NOTE — PROGRESS NOTES
"Progress Note - Hospitalist   Name: Severiano Feliciano 89 y.o. 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    Acute exacerbation of chronic obstructive pulmonary disease (COPD) (HCC)  Patient has history of COPD FEV1 0.54 L 25% protected in 2024.  At baseline requires nebulization therapy, not on azithromycin due to hearing loss.    -Presented with acute exacerbation and altered mentation carbon dioxide retention.  -Continue with steroids, completed azithromycin  -Continue with nebulization.  -Palliative care consulted meeting tomorrow 2/4/25-patient did not wanted to discuss CODE STATUS or goals of care\" refused any further discussion said he will think about it by tomorrow  -Pulmonology on board appreciate recs, require outpatient follow-up appointment scheduled for 3/19  Chronic diastolic (congestive) heart failure (HCC)  Wt Readings from Last 3 Encounters:   02/02/25 60.3 kg (132 lb 15 oz)   11/05/24 60.1 kg (132 lb 9.6 oz)   09/20/24 59.3 kg (130 lb 12.8 oz)     Echo on 4/11/2023 reports normal ventricular cavity size with grade 1 diastolic dysfunction.  As per chart review patient takes torsemide 5 mg daily but as per wife and son there was some issues with allergies to furosemide and torsemide.  The family said they will find out the paperwork to know the exact medication with which patient is allergic and then can be restarted tomorrow as family has to still get back    Abnormal CT " of the chest  Right middle lobe nodule endorsed in January 2024, outpatient follow-up not needed as per pulmonology  Solitary pulmonary nodule on lung CT  New right upper lobe opacity on imaging no leukocytosis procalcitonin negative  Pulmonology on board  Palliative care encounter  Palliative care has been on board   Family meeting today at 1 PM with all the children and wife.  Patient's condition along with end-stage COPD and expectations were discussed.  CODE STATUS change was also discussed.  Patient's family wanted to see if he can decrease his oxygen down and how he does, it was reiterated that he does not look like he would perform well but patient's family wanted to see if he can tolerate 3 to 4 L currently planning to transition patient to mid flow and then see if patient can tolerate further go down.  Hypernatremia  Blood workup showed sodium levels 150  Nephrology on board appreciate recs    VTE Pharmacologic Prophylaxis:   Enoxaparin    Mobility:   Basic Mobility Inpatient Raw Score: 15  -[facility] Goal: 4: Move to chair/commode  -[facility] Achieved: 4: Move to chair/commode      Education and Discussions with Family / Patient:  Family meeting with patient's sons and daughters along with wife.     Current Length of Stay: 5 day(s)  Current Patient Status: Inpatient     Discharge Plan: Anticipate discharge in >72 hrs to to be decided not stable for home as of yet    Code Status: Level 1 - Full Code    Subjective   Patient seen and examined at bedside continues to wheeze.    Objective :  Temp:  [97.1 °F (36.2 °C)-98.3 °F (36.8 °C)] 97.1 °F (36.2 °C)  HR:  [71-98] 90  BP: (131-178)/() 131/112  Resp:  [20-22] 20  SpO2:  [93 %-100 %] 100 %  O2 Device: Mid flow nasal cannula  Nasal Cannula O2 Flow Rate (L/min):  [7 L/min-35 L/min] 7 L/min  FiO2 (%):  [45] 45    Body mass index is 23.55 kg/m².     Input and Output Summary (last 24 hours):     Intake/Output Summary (Last 24 hours) at 2/7/2025 1524  Last data filed  at 2/7/2025 0358  Gross per 24 hour   Intake --   Output 100 ml   Net -100 ml       Physical Exam  Vitals and nursing note reviewed.   Constitutional:       General: He is not in acute distress.     Appearance: He is well-developed. He is ill-appearing.   HENT:      Head: Normocephalic and atraumatic.   Eyes:      Conjunctiva/sclera: Conjunctivae normal.   Cardiovascular:      Rate and Rhythm: Normal rate and regular rhythm.      Heart sounds: No murmur heard.  Pulmonary:      Effort: Pulmonary effort is normal. No respiratory distress.      Breath sounds: Wheezing present.   Abdominal:      Palpations: Abdomen is soft.      Tenderness: There is no abdominal tenderness.   Musculoskeletal:         General: No swelling.      Cervical back: Neck supple.   Skin:     General: Skin is warm and dry.      Capillary Refill: Capillary refill takes less than 2 seconds.   Neurological:      Mental Status: He is alert.      Comments: Alert   Psychiatric:         Mood and Affect: Mood normal.           Lines/Drains:              Lab Results: I have reviewed the following results:   Results from last 7 days   Lab Units 02/07/25  0542 02/04/25  0638 02/03/25  0501   WBC Thousand/uL 6.26   < > 6.47   HEMOGLOBIN g/dL 10.1*   < > 10.3*   HEMATOCRIT % 35.0*   < > 35.7*   PLATELETS Thousands/uL 87*   < > 86*   SEGS PCT %  --   --  81*   LYMPHO PCT %  --   --  9*   MONO PCT %  --   --  9   EOS PCT %  --   --  0    < > = values in this interval not displayed.     Results from last 7 days   Lab Units 02/07/25  0542 02/03/25  0501 02/02/25  0456   SODIUM mmol/L 145   < > 146   POTASSIUM mmol/L 4.0   < > 4.2   CHLORIDE mmol/L 101   < > 93*   CO2 mmol/L 39*   < > >45*   BUN mg/dL 20   < > 22   CREATININE mg/dL 0.70   < > 0.81   ANION GAP mmol/L 5  --   --    CALCIUM mg/dL 9.0   < > 10.1   ALBUMIN g/dL  --   --  4.1   TOTAL BILIRUBIN mg/dL  --   --  0.68   ALK PHOS U/L  --   --  54   ALT U/L  --   --  13   AST U/L  --   --  23   GLUCOSE RANDOM  mg/dL 89   < > 102    < > = values in this interval not displayed.     Results from last 7 days   Lab Units 02/02/25  0456   INR  1.04     Results from last 7 days   Lab Units 02/03/25  2208   POC GLUCOSE mg/dl 136         Results from last 7 days   Lab Units 02/03/25  0501 02/02/25  0456   LACTIC ACID mmol/L  --  1.2   PROCALCITONIN ng/ml 0.12 0.11       Recent Cultures (last 7 days):   Results from last 7 days   Lab Units 02/02/25  0458 02/02/25  0456   BLOOD CULTURE  No Growth After 5 Days. No Growth After 5 Days.       XR chest portable  Result Date: 2/2/2025  Impression: Mild pulmonary venous congestion. Moderate opacity in the right lower lobe. See subsequent chest CT. Emphysema. Workstation performed: [id number]     CT chest abdomen pelvis w contrast  Result Date: 2/2/2025  Impression: 1. Patchy opacity posterolateral right upper lobe could represent atelectasis or pneumonia. Little change in chronic right lower lobe consolidation. 2. Background emphysema. Slowly enlarging 8 mm right middle lobe nodule. Possibility of neoplasm cannot be excluded. Based on current Fleischner Society 2017 Guidelines on incidental pulmonary nodule, follow-up non-contrast CT is recommended at 6 months from the initial examination and, if stable at that time, an additional follow-up is recommended for 18-24 months from the initial examination. 3. Thickening of the urinary bladder with hyperemia involving posterior lateral bladder diverticulum. Correlate with urinalysis regarding cystitis. 4. 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 No change in aneurysmal dilatation junction distal ascending aorta/arch. 5. Question thickening of the proximal esophagus. Consider esophagram. 6. Diffuse colonic diverticulosis without diverticulitis. Moderate to large amount of colonic stool suggesting constipation. 7. Cholelithiasis. Additional incidental findings as  above. The study was marked in EPIC for follow-up. Workstation performed: [id number]     CT spine cervical without contrast  Result Date: 2/2/2025  Impression: No cervical spine fracture or traumatic malalignment. Workstation performed: [id number]     CT head without contrast  Result Date: 2/2/2025  Impression: No acute intracranial abnormality.  Chronic microangiopathic changes. 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 Workstation performed: [id number]         Last 24 Hours Medication List:     Current Facility-Administered Medications:     acetaminophen (TYLENOL) tablet 975 mg, Q8H PRN    aluminum-magnesium hydroxide-simethicone (MAALOX) oral suspension 30 mL, Q4H PRN    amLODIPine (NORVASC) tablet 5 mg, Daily    divalproex sodium (DEPAKOTE SPRINKLE) capsule 125 mg, HS PRN **OR** valproate (DEPACON) 125 mg in sodium chloride 0.9 % 50 mL IVPB, HS PRN, Last Rate: 125 mg (02/02/25 2021)    enoxaparin (LOVENOX) subcutaneous injection 40 mg, Daily    guaiFENesin (ROBITUSSIN) oral liquid 200 mg, Q4H PRN    ipratropium (ATROVENT) 0.02 % inhalation solution 0.5 mg, TID    levalbuterol (XOPENEX) inhalation solution 1.25 mg, TID **AND** [DISCONTINUED] sodium chloride 0.9 % inhalation solution 3 mL, TID    losartan (COZAAR) tablet 25 mg, BID    melatonin tablet 3 mg, HS PRN    polyethylene glycol (MIRALAX) packet 17 g, Daily PRN    predniSONE tablet 40 mg, Daily    Administrative Statements   Today, Patient Was Seen By: Christina Price MD  I have spent a total time of >35 minutes in caring for this patient on the day of the visit/encounter including Diagnostic results, Prognosis, Risks and benefits of tx options, Instructions for management, Patient and family education, Importance of tx compliance, Risk factor reductions, Impressions, Counseling / Coordination of care, Documenting in the medical record, Reviewing / ordering tests, medicine, procedures  , Obtaining or reviewing history  , and  Communicating with other healthcare professionals .    **Please Note: This note may have been constructed using a voice recognition system.**

## 2025-02-07 NOTE — PLAN OF CARE
Problem: OCCUPATIONAL THERAPY ADULT  Goal: Performs self-care activities at highest level of function for planned discharge setting.  See evaluation for individualized goals.  Description: Treatment Interventions: ADL retraining, Functional transfer training, UE strengthening/ROM, Endurance training, Cognitive reorientation, Patient/family training, Equipment evaluation/education, Compensatory technique education, Continued evaluation, Energy conservation          See flowsheet documentation for full assessment, interventions and recommendations.   Note: Limitation: Decreased ADL status, Decreased UE strength, Decreased Safe judgement during ADL, Decreased cognition, Decreased endurance, Decreased high-level ADLs  Prognosis: Fair  Assessment: Pt is a 88y/o male admitted to the hospital 2* symptoms of altered mental status, SOB, cough. Pt noted with acute/chronic respiratory failure with hypoxia and hypercapnia, acute exacerbation of chronic obstructive pulmonary disease, and acute pulmonary hypertension. Pt with PMH anemia, AAA, CAD, COPD, CHF, DM, HTN, PVD, O2 dependent, and cardiac sx. PTA family(wife) states pt had assistance with his ADLs, transfers, ambulation--with RW vs SPC; neg home alone--pt and pt's wife noted as poor historians; information matches from last admission's(9/24) notes. During initial eval, pt demonstrated deficits with his functional balance, functional mobility, ADL status, transfer safety, b/l UE strength, activity tolerance(currently fair=15-20mins), and cognition(i.e.orientation, memory, problem-solving,  judgement/safety). If pt is able to demonstrate tx carryover, pt would benefit from continued OT tx for the above deficits. 2-5xwk/1-2ks. The patient's raw score on the AM-PAC Daily Activity Inpatient Short Form is 18. A raw score of less than 19 suggests the patient may benefit from discharge to post-acute rehabilitation services. Please refer to the recommendation of the  Occupational Therapist for safe discharge planning.     Rehab Resource Intensity Level, OT: II (Moderate Resource Intensity)

## 2025-02-07 NOTE — PLAN OF CARE
Problem: PHYSICAL THERAPY ADULT  Goal: Performs mobility at highest level of function for planned discharge setting.  See evaluation for individualized goals.  Description: Treatment/Interventions: Functional transfer training, LE strengthening/ROM, Elevations, Therapeutic exercise, Endurance training, Patient/family training, Bed mobility, Gait training, Spoke to nursing, OT  Equipment Recommended: Walker       See flowsheet documentation for full assessment, interventions and recommendations.  Note: Prognosis: Fair  Problem List: Decreased strength, Decreased endurance, Impaired balance, Decreased mobility, Decreased cognition, Impaired judgement, Decreased safety awareness    Assessment: Pt. 89 y.o.male admitted for Altered mental status Acute on Chronic respiratory failure with hypoxia and hypercapnia; COPD exacerbation; pulmonary nodules; hypernatremia.  Pt referred to PT for mobility assessment & D/C planning. Please see above for information re: home set-up & PLOF as well as objective findings during PT assessment. On eval, pt functioning below baseline hence will continue skilled PT to improve function & safety. Pt require minAx1 for transfers & amb w/ RW + cues for techniques & safety. Gait deviations as above but no gross LOB noted. Dec amb tolerance 2* to weakness & fatigue. Pt on 35L HFNC w/ resting SpO2 88-89%. During amb, SpO2 dropped to 81% w/ 35L HFNC. The patient's AM-PAC Basic Mobility Inpatient Short Form Raw Score is 15. A Raw score of less than or equal to 16 suggests the patient may benefit from discharge to post-acute rehabilitation services. Please also refer to the recommendation of the Physical Therapist for safe discharge planning. From PT standpoint, will recommend Level II (moderate resource intensity) rehab services at D/C. No SOB & dizziness reported t/o session. Nsg staff most recent vital signs as follows: /92 (BP Location: Right arm)   Pulse 89   Temp 98 °F (36.7 °C)  "(Temporal)   Resp 20   Ht 5' 2.99\" (1.6 m)   Wt 60.3 kg (132 lb 15 oz)   SpO2 98%   BMI 23.55 kg/m² . At end of session, pt remain OOB in chair in stable condition, call bell & phone in reach, chair alarm activated, all lines intact. Fall precautions reinforced w/ good understanding. CM to follow. Nsg staff to continue to mobilized pt (OOB in chair for all meals & ambulate in room) as tolerated to prevent further decline in function. Will also recommend Restorative for daily amb &/or daily OOB in chair as appropriate. Nsg notified. Co-eval was necessary to complete this PT eval for the pt's best interest given pt's medical acuity & complexity.    Barriers to Discharge: Inaccessible home environment  Barriers to Discharge Comments: (+) stairs    Rehab Resource Intensity Level, PT: II (Moderate Resource Intensity)    See flowsheet documentation for full assessment.        "

## 2025-02-07 NOTE — OCCUPATIONAL THERAPY NOTE
Occupational Therapy Evaluation     Patient Name: Severiano Feliciano  Today's Date: 2/7/2025  Problem List  Active Problems:    Hypertension    Pulmonary hypertension (HCC)    Acute on chronic respiratory failure with hypoxia and hypercapnia (HCC)    Acute exacerbation of chronic obstructive pulmonary disease (COPD) (HCC)    Chronic diastolic (congestive) heart failure (HCC)    JAY treated with BiPAP    Abnormal CT of the chest    Solitary pulmonary nodule on lung CT    Palliative care encounter    Hypernatremia    Past Medical History  Past Medical History:   Diagnosis Date    Acute metabolic encephalopathy 06/13/2020    Age-related cataract of right eye 04/04/2023    patient is medically cleared and presents with low risk of complication with this upcoming R cataract surgery.    Anemia     Aneurysm, aorta, thoracic (HCC)     Appendicolith     Ascending aortic aneurysm (HCC)     3.7    Asthma     BPH (benign prostatic hyperplasia)     CAD (coronary artery disease)     noted on CT scan    CHF (congestive heart failure) (HCC)     COPD (chronic obstructive pulmonary disease) (HCC)     Delirium 04/25/2024    Descending thoracic aortic aneurysm (HCC)     Diabetes mellitus (HCC)     Diverticulosis     Former tobacco use     GERD (gastroesophageal reflux disease)     History of DVT (deep vein thrombosis)     Left leg    History of transfusion     Hypertension     Hypoxemia 04/30/2023    Inguinal hernia     right    Nephrolithiasis     Oxygen dependent     2LNC    Oxygen dependent     Pneumonia     Pre-diabetes     Prostate calculus     PVD (peripheral vascular disease) (HCC)     Recurrent right inguinal hernia w incarcertion 01/04/2023    SIRS (systemic inflammatory response syndrome) ([facility]) 09/04/2024    Solitary pulmonary nodule on lung CT 02/02/2025    8 x 5 mm right middle lobe nodule, most recently 5 x 3 mm (3/163) and about 4 x 2 mm in January 2024.       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)     Varicose vein of leg     b/l     Past Surgical History  Past Surgical History:   Procedure Laterality Date    CARDIAC SURGERY      ESOPHAGOGASTRODUODENOSCOPY      HERNIA REPAIR Right 1/21/2019    Procedure: REPAIR HERNIA INGUINAL WITH MESH;  Surgeon: David Lowry MD;  Location:  MAIN OR;  Service: General    HERNIA REPAIR Right 7/22/2023    Procedure: REPAIR RECURRENT INCARERATED HERNIA INGUINAL OPEN, RIGHT ORCHIECTOMY;  Surgeon: Mason Bertrand MD;  Location: [facility];  Service: General    INGUINAL HERNIA REPAIR Bilateral     IR TEVAR  12/27/2018    MT EVASC RPR DTA COVERAGE ART ORIGIN 1ST ENDOPROSTH N/A 12/27/2018    Procedure: TEVAR - endovascular thoracic aortic aneurysm repair;  Surgeon: Gladis Ortega MD;  Location: [facility];  Service: Vascular    THORACIC AORTIC ANEURYSM REPAIR  12/27/2018    VARICOSE VEIN SURGERY Bilateral     vein stripping           02/07/25 0903   Note Type   Note type Evaluation   Pain Assessment   Pain Assessment Tool FLACC   Pain Rating: FLACC (Rest) - Face 0   Pain Rating: FLACC (Rest) - Legs 0   Pain Rating: FLACC (Rest) - Activity 0   Pain Rating: FLACC (Rest) - Cry 0   Pain Rating: FLACC (Rest) - Consolability 0   Score: FLACC (Rest) 0   Restrictions/Precautions   Weight Bearing Precautions Per Order No   Other Precautions Cognitive;Chair Alarm;Bed Alarm;Fall Risk;O2;Multiple lines;Hard of hearing  (Hi-flow, 35liters, SPO2=81-90)   Home Living   Type of Home House   Home Layout Multi-level   Bathroom Shower/Tub Tub/shower unit   Bathroom Toilet Standard   Home Equipment Walker;Cane;Wheelchair-manual   Prior Function   Level of [place] Needs assistance with functional mobility;Needs assistance with ADLs;Needs assistance with IADLS   Lives With Spouse   Falls in the last 6 months   (unable to assess)  "  Comments PTA family(wife) states pt had assistance with his ADLs, transfers, ambulation--with RW vs SPC; neg home alone--pt and pt's wife noted as poor historians; information matches from last admission's(9/24) notes   Lifestyle   Autonomy PTA family(wife) states pt had assistance with his ADLs, transfers, ambulation--with RW vs SPC; neg home alone--pt and pt's wife noted as poor historians; information matches from last admission's(9/24) notes   Reciprocal Relationships supportive son   Service to Others unable to assess   Intrinsic Gratification watching TV   Subjective   Subjective \"I know I'm in a hospital, but I don't know which one.\"   ADL   Where Assessed Edge of bed   Eating Assistance 5  Supervision/Setup   Grooming Assistance 5  Supervision/Setup   UB Bathing Assistance 4  Minimal Assistance   LB Bathing Assistance 3  Moderate Assistance   UB Dressing Assistance 4  Minimal Assistance   LB Dressing Assistance 3  Moderate Assistance   Toileting Assistance  4  Minimal Assistance   Transfers   Sit to Stand 4  Minimal assistance   Additional items Assist x 1;Increased time required;Verbal cues   Stand to Sit 4  Minimal assistance   Additional items Assist x 1;Increased time required;Verbal cues   Functional Mobility   Functional Mobility 4  Minimal assistance   Additional Comments x1   Additional items Rolling walker   Balance   Static Sitting Good   Dynamic Sitting Fair +   Static Standing Fair   Dynamic Standing Fair -   Activity Tolerance   Activity Tolerance Patient limited by fatigue;Other (Comment)  (cognition)   RUE Assessment   RUE Assessment WFL   RUE Strength   RUE Overall Strength Within Functional Limits - able to perform ADL tasks with strength  (4/5 throughout)   LUE Assessment   LUE Assessment WFL   LUE Strength   LUE Overall Strength Within Functional Limits - able to perform ADL tasks with strength  (4/5 throughout)   Hand Function   Gross Motor Coordination Functional   Fine Motor " Coordination Functional   Sensation   Light Touch No apparent deficits   Proprioception   Proprioception No apparent deficits   Vision-Basic Assessment   Current Vision   (glasses)   Vision - Complex Assessment   Acuity   (impaired)   Psychosocial   Psychosocial (WDL) X   Patient Behaviors/Mood [place] affect;Cooperative   Perception   Inattention/Neglect Appears intact   Cognition   Overall Cognitive Status Impaired   Arousal/Participation Alert   Attention Attends with cues to redirect   Orientation Level Oriented to person;Disoriented to place;Disoriented to time;Disoriented to situation   Memory Decreased short term memory;Decreased recall of recent events;Decreased recall of precautions   Following Commands Follows one step commands with increased time or repetition   Comments Togolese-speaking only; [email or web address]  #[id number] used with limited success; hx cognitive deficits?   Assessment   Limitation Decreased ADL status;Decreased UE strength;Decreased Safe judgement during ADL;Decreased cognition;Decreased endurance;Decreased high-level ADLs   Prognosis Fair   Assessment Pt is a 89y/o male admitted to the hospital 2* symptoms of altered mental status, SOB, cough. Pt noted with acute/chronic respiratory failure with hypoxia and hypercapnia, acute exacerbation of chronic obstructive pulmonary disease, and acute pulmonary hypertension. Pt with PMH anemia, AAA, CAD, COPD, CHF, DM, HTN, PVD, O2 dependent, and cardiac sx. PTA family(wife) states pt had assistance with his ADLs, transfers, ambulation--with RW vs SPC; neg home alone--pt and pt's wife noted as poor historians; information matches from last admission's(9/24) notes. During initial eval, pt demonstrated deficits with his functional balance, functional mobility, ADL status, transfer safety, b/l UE strength, activity tolerance(currently fair=15-20mins), and cognition(i.e.orientation, memory, problem-solving,  judgement/safety). If pt is able to demonstrate tx  carryover, pt would benefit from continued OT tx for the above deficits. 2-5xwk/1-2ks. The patient's raw score on the AM-PAC Daily Activity Inpatient Short Form is 18. A raw score of less than 19 suggests the patient may benefit from discharge to post-acute rehabilitation services. Please refer to the recommendation of the Occupational Therapist for safe discharge planning.   Goals   Patient Goals unable to fully assess   STG Time Frame   (1-7 days)   Short Term Goal #1 Pt will demonstrate improved activity tolerance to good(20-30mins) and standing tolerance to 3-5mins to assist with ADLs.   Short Term Goal #2 Pt will independently demonstrate knowledge and application of proper energy conservation techniques 100% of the time.   Short Term Goal  Pt will tolerate continued cognitive/home-safety assessment and appropriate d/c recommendations will be provided.   LTG Time Frame   (7-14 days)   Long Term Goal #1 Pt will demonstrate g/g- balance with all functional activities.   Long Term Goal #2 Pt will demonstrate proper walker/transfer safety 100% of the time.   Long Term Goal Pt will demonstrate supervision with his UE and [place] with his LE bathing/dressing.   Plan   Treatment Interventions ADL retraining;Functional transfer training;UE strengthening/ROM;Endurance training;Cognitive reorientation;Patient/family training;Equipment evaluation/education;Compensatory technique education;Continued evaluation;Energy conservation   Goal Expiration Date 02/21/25   OT Treatment Day 0   OT Frequency   (2-5xwk/1-2wks)   Discharge Recommendation   Rehab Resource Intensity Level, OT II (Moderate Resource Intensity)   AM-PAC Daily Activity Inpatient   Lower Body Dressing 3   Bathing 3   Toileting 3   Upper Body Dressing 3   Grooming 3   Eating 3   Daily Activity Raw Score 18   Daily Activity Standardized Score (Calc for Raw Score >=11) 38.66   AM-PAC Applied Cognition Inpatient   Following a Speech/Presentation 2   Understanding  Ordinary Conversation 2   Taking Medications 1   Remembering Where Things Are Placed or Put Away 1   Remembering List of 4-5 Errands 1   Taking Care of Complicated Tasks 1   Applied Cognition Raw Score 8   Applied Cognition Standardized Score 19.32   Corona Richardson

## 2025-02-07 NOTE — ASSESSMENT & PLAN NOTE
Not on any diuretic therapy, CT of the chest abdomen pelvis without contrast does not show any overt pulmonary edema: Holding any diuretics at the moment

## 2025-02-07 NOTE — PROGRESS NOTES
Progress Note - Pulmonology   Name: Severiano Feliciano 89 y.o. male I MRN: [id number]  Unit/Bed#: E4 -01 I Date of Admission: 2/2/2025   Date of Service: 2/7/2025 I Hospital Day: 5    Assessment & Plan  Acute exacerbation of chronic obstructive pulmonary disease (COPD) (HCC)  -Very severe COPD, FEV1 0.54L 25% predicted in 2024  -Follows with Dr. Hayward  -Baseline regimen includes all nebulized therapy. Not on chronic azithromycin due to hearing loss  -Presented with acute exacerbation  -Prednisone 40mg daily, decrease by 10mg every 3 days   -Received 3 days of azithromycin  -Continue nebulized bronchodilators  -Continue to follow with pulmonary as outpatient - appointment scheduled for 3/19  Acute on chronic respiratory failure with hypoxia and hypercapnia (HCC)  -At baseline, on 3L supplemental oxygen with exertion  -Currently on HFNC 30L 35% FiO2  -Continue nightly BiPAP as needed  -Continue to wean supplemental oxygen to maintain SpO2 > 88%  -Encourage OOB as tolerated, incentive spirometry  -Ambulatory O2 evaluation prior to discharge  -Avoid further sedating medications  Pulmonary hypertension (HCC)  -Likely WHO group III  -Continue to monitor volume status  Chronic diastolic (congestive) heart failure (HCC)  -Weight stable  -Continue diuresis per primary team  JAY treated with BiPAP  -Not compliant with BIPAP at home  -Continue nightly BiPAP while inpatient  Abnormal CT of the chest  -New RUL opacity on imaging; no leukocytosis, procalcitonin negative.  -Repeat procal negative  Solitary pulmonary nodule on lung CT  -8 x 5 mm right middle lobe nodule, most recently 5 x 3 mm (3/163) and about 4 x 2 mm in January 2024.   -Outpt follow up. Given age and severe co-morbidities, this likely does not need to be followed.  Palliative care encounter  -Palliative care following - plan for goals of care discussion today at 1pm    24 Hour Events : No events  Subjective : Patient examined at bedside this morning. He  was sitting up in the chair, appeared much more alert and interactive. He was on HFNL 30L 35% FiO2. Wife also present at bedside. Plan for family meeting today at 1pm to discuss goals of care.     Objective :  Temp:  [97.5 °F (36.4 °C)-98.3 °F (36.8 °C)] 98.1 °F (36.7 °C)  HR:  [71-98] 83  BP: (136-178)/(70-96) 178/90  Resp:  [20-22] 20  SpO2:  [93 %-98 %] 95 %  O2 Device: High flow nasal cannula  Nasal Cannula O2 Flow Rate (L/min):  [35 L/min] 35 L/min  FiO2 (%):  [45] 45    Physical Exam  Vitals reviewed.   Constitutional:       General: He is not in acute distress.     Appearance: He is not ill-appearing.      Comments: Thin, cachectic   HENT:      Head: Normocephalic and atraumatic.   Eyes:      Pupils: Pupils are equal, round, and reactive to light.   Cardiovascular:      Rate and Rhythm: Normal rate and regular rhythm.      Heart sounds: Normal heart sounds.   Pulmonary:      Effort: Pulmonary effort is normal.      Breath sounds: No wheezing, rhonchi or rales.      Comments: Clear but diminished  Skin:     General: Skin is warm and dry.      Capillary Refill: Capillary refill takes less than 2 seconds.   Psychiatric:         Mood and Affect: Mood normal.         Behavior: Behavior normal.           Lab Results: I have reviewed the following results:   .     02/07/25  0542   WBC 6.26   HGB 10.1*   HCT 35.0*   PLT 87*   SODIUM 145   K 4.0      CO2 39*   BUN 20   CREATININE 0.70   GLUC 89     ABG:   No new results in last 24 hours.      Labs per my review reveal normal renal function, metabolic alkalosis, stable anemia     RP2 negative     ABG shows chronic compensated hypercapnia and hypoxia     Imaging Results Review: I personally reviewed the following image studies in PACS and associated radiology reports: CT chest. My interpretation of the radiology images/reports is: CT chest per my review shows mild to moderate emphysema, right lower lobe consolidation unchanged, right upper lobe opacity, 8 x 5 mm  right middle lobe nodule, mucus in right mainstem bronchus.     PFT Results Reviewed: reviewed and interpreted  PFTs 3/6/2024 per my review shows very severe obstruction FEV1 0.54 L, 25% predicted    Traci Gray MD  Pulmonary & Critical Care Medicine Fellow PGY-4  [facility]

## 2025-02-07 NOTE — CASE MANAGEMENT
Case Management Discharge Planning Note    Patient name Severiano Feliciano  Location East 4 /E4 -* MRN [id number]  : 1935 Date 2025       Current Admission Date: 2025  Current Admission Diagnosis:Acute exacerbation of chronic obstructive pulmonary disease (COPD) (HCC)   Patient Active Problem List    Diagnosis Date Noted Date Diagnosed    Hypernatremia 2025     Palliative care encounter 2025     Abnormal CT of the chest 2025     Solitary pulmonary nodule on lung CT 2025     JAY treated with BiPAP 2024     Elevated troponin 2024     Abnormal CT of the abdomen 2024     Acute exacerbation of chronic obstructive pulmonary disease (COPD) ([facility]) 2024     Chronic bilateral low back pain without sciatica 2024     Pulmonary hypertension (HCC) 2024     Aneurysm, carotid artery, internal 2024     Unspecified severe protein-calorie malnutrition (HCC) 01/15/2024     Macrocytosis 2023     Constipation 2023     Prediabetes 10/25/2023     Bilateral hearing loss 10/25/2023     PAC (premature atrial contraction) 2023     Vitamin B12 deficiency 2023     Vitamin D deficiency 2023     Weight loss 2022     Rectal bleeding 2021     Hyperlipidemia 2021     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     Dysphagia 2020     TIA (transient ischemic attack) 2020     S/P hernia repair 2019     Acquired renal cyst of left kidney 2019     Chronic diastolic (congestive) heart failure (HCC) 2019     Thrombocytopenia (HCC) 2018     Varicose veins of both lower extremities with pain 2018     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HCC) 2018     Acute on chronic respiratory failure with hypoxia and hypercapnia (HCC) 2018     Descending aortic aneurysm (HCC) 2018     History of DVT (deep vein thrombosis) 2018      Hypertension 02/01/2018     COPD, severe (HCC) 02/01/2018     Benign essential hypertension 08/02/2017       LOS (days): 5  Geometric Mean LOS (GMLOS) (days):   Days to GMLOS:     OBJECTIVE:  Risk of Unplanned Readmission Score: 26.34         Current admission status: Inpatient   Preferred Pharmacy:   [facility], [facility] [address]  [address] [facility] [address]  Phone: [phone] Fax: [phone]    [facility] - [place], [place] - [address]  [address] [place] [address]  Phone: [phone] Fax: [phone]    [facility] - [place], [place] - [address]  [address] [place] [address]  Phone: [phone] Fax: [phone]    Primary Care Provider: Kirby Casanova MD    Primary Insurance: [employer]ARE [facility]  Secondary Insurance: [facility]    DISCHARGE DETAILS:                                                                                                 Additional Comments: CM participated in a family meeting with the pt's wife, son, Daljit who translated and 5-6 other family members.  Attending, Pulmonary, and Palliative care also participated in the meeting.  They had informed the family of his current medical status, and poor prognosis due to end stage COPD.  They were made aware that he is on high flow O2 that can not be delivered at home but mid flow O2 can be be trialed, but they all were again informed of poor outcome due to his end stage COPD.  Via interpretation the wife stated that she wanted him home but she would bring him back to the hospital if any issues occurred.  All doctors explained to them scenario if CPR was performed and he was placed on the ventilator.  The team encouraged the the wife and family to decide on code status, and discussions for home vs home with palliative/hospice could be done later.  Family requesting trail on mid flow to see how well he could  tolerate that O2 level.  CM to follow to set up best discharge plans based on GOC.

## 2025-02-07 NOTE — ASSESSMENT & PLAN NOTE
-At baseline, on 3L supplemental oxygen with exertion  -Currently on HFNC 30L 35% FiO2  -Continue nightly BiPAP as needed  -Continue to wean supplemental oxygen to maintain SpO2 > 88%  -Encourage OOB as tolerated, incentive spirometry  -Ambulatory O2 evaluation prior to discharge  -Avoid further sedating medications

## 2025-02-08 VITALS
RESPIRATION RATE: 18 BRPM | TEMPERATURE: 98 F | HEART RATE: 81 BPM | HEIGHT: 63 IN | SYSTOLIC BLOOD PRESSURE: 147 MMHG | BODY MASS INDEX: 23.55 KG/M2 | WEIGHT: 132.94 LBS | OXYGEN SATURATION: 97 % | DIASTOLIC BLOOD PRESSURE: 64 MMHG

## 2025-02-08 LAB
ANION GAP SERPL CALCULATED.3IONS-SCNC: 4 MMOL/L (ref 4–13)
BASE EXCESS BLDA CALC-SCNC: 10.5 MMOL/L
BUN SERPL-MCNC: 19 MG/DL (ref 5–25)
CALCIUM SERPL-MCNC: 9.3 MG/DL (ref 8.4–10.2)
CHLORIDE SERPL-SCNC: 100 MMOL/L (ref 96–108)
CO2 SERPL-SCNC: 43 MMOL/L (ref 21–32)
CREAT SERPL-MCNC: 0.64 MG/DL (ref 0.6–1.3)
ERYTHROCYTE [DISTWIDTH] IN BLOOD BY AUTOMATED COUNT: 13 % (ref 11.6–15.1)
GFR SERPL CREATININE-BSD FRML MDRD: 88 ML/MIN/1.73SQ M
GLUCOSE SERPL-MCNC: 130 MG/DL (ref 65–140)
HCO3 BLDA-SCNC: 37.3 MMOL/L (ref 22–28)
HCT VFR BLD AUTO: 33.6 % (ref 36.5–49.3)
HGB BLD-MCNC: 9.8 G/DL (ref 12–17)
IPAP: 18
MAGNESIUM SERPL-MCNC: 2 MG/DL (ref 1.9–2.7)
MCH RBC QN AUTO: 31.4 PG (ref 26.8–34.3)
MCHC RBC AUTO-ENTMCNC: 29.2 G/DL (ref 31.4–37.4)
MCV RBC AUTO: 108 FL (ref 82–98)
NON VENT- BIPAP: ABNORMAL
O2 CT BLDA-SCNC: 15.1 ML/DL (ref 16–23)
OXYHGB MFR BLDA: 98.1 % (ref 94–97)
PCO2 BLDA: 61.8 MM HG (ref 36–44)
PEEP MAX SETTING VENT: 6 CM[H2O]
PH BLDA: 7.4 [PH] (ref 7.35–7.45)
PLATELET # BLD AUTO: 109 THOUSANDS/UL (ref 149–390)
PMV BLD AUTO: 10 FL (ref 8.9–12.7)
PO2 BLDA: 121.8 MM HG (ref 75–129)
POTASSIUM SERPL-SCNC: 3.7 MMOL/L (ref 3.5–5.3)
RBC # BLD AUTO: 3.12 MILLION/UL (ref 3.88–5.62)
SODIUM SERPL-SCNC: 147 MMOL/L (ref 135–147)
SPECIMEN SOURCE: ABNORMAL
VENT BIPAP FIO2: 50 %
WBC # BLD AUTO: 5.79 THOUSAND/UL (ref 4.31–10.16)

## 2025-02-08 PROCEDURE — 94762 N-INVAS EAR/PLS OXIMTRY CONT: CPT

## 2025-02-08 PROCEDURE — 99232 SBSQ HOSP IP/OBS MODERATE 35: CPT | Performed by: INTERNAL MEDICINE

## 2025-02-08 PROCEDURE — 82805 BLOOD GASES W/O2 SATURATION: CPT

## 2025-02-08 PROCEDURE — 94760 N-INVAS EAR/PLS OXIMETRY 1: CPT

## 2025-02-08 PROCEDURE — 99239 HOSP IP/OBS DSCHRG MGMT >30: CPT

## 2025-02-08 PROCEDURE — 80048 BASIC METABOLIC PNL TOTAL CA: CPT | Performed by: INTERNAL MEDICINE

## 2025-02-08 PROCEDURE — 94761 N-INVAS EAR/PLS OXIMETRY MLT: CPT

## 2025-02-08 PROCEDURE — 94640 AIRWAY INHALATION TREATMENT: CPT

## 2025-02-08 PROCEDURE — 83735 ASSAY OF MAGNESIUM: CPT | Performed by: INTERNAL MEDICINE

## 2025-02-08 PROCEDURE — 94003 VENT MGMT INPAT SUBQ DAY: CPT

## 2025-02-08 PROCEDURE — 85027 COMPLETE CBC AUTOMATED: CPT

## 2025-02-08 RX ORDER — AMLODIPINE BESYLATE 5 MG/1
5 TABLET ORAL DAILY
Qty: 30 TABLET | Refills: 0 | Status: SHIPPED | OUTPATIENT
Start: 2025-02-08 | End: 2025-02-13 | Stop reason: SINTOL

## 2025-02-08 RX ORDER — PREDNISONE 10 MG/1
TABLET ORAL
Qty: 12 TABLET | Refills: 0 | Status: SHIPPED | OUTPATIENT
Start: 2025-02-08 | End: 2025-02-13 | Stop reason: ALTCHOICE

## 2025-02-08 RX ADMIN — ENOXAPARIN SODIUM 40 MG: 40 INJECTION SUBCUTANEOUS at 08:43

## 2025-02-08 RX ADMIN — LEVALBUTEROL HYDROCHLORIDE 1.25 MG: 1.25 SOLUTION RESPIRATORY (INHALATION) at 07:23

## 2025-02-08 RX ADMIN — IPRATROPIUM BROMIDE 0.5 MG: 0.5 SOLUTION RESPIRATORY (INHALATION) at 07:23

## 2025-02-08 RX ADMIN — PREDNISONE 40 MG: 20 TABLET ORAL at 08:43

## 2025-02-08 RX ADMIN — LOSARTAN POTASSIUM 25 MG: 25 TABLET, FILM COATED ORAL at 08:43

## 2025-02-08 RX ADMIN — AMLODIPINE BESYLATE 5 MG: 5 TABLET ORAL at 08:43

## 2025-02-08 NOTE — CASE MANAGEMENT
Case Management Discharge Planning Note    Patient name Severiano Feliciano  Location East 4 /E4 -* MRN [id number]  : 1935 Date 2025       Current Admission Date: 2025  Current Admission Diagnosis:Acute on chronic respiratory failure with hypoxia and hypercapnia (HCC)   Patient Active Problem List    Diagnosis Date Noted Date Diagnosed    Hypernatremia 2025     Palliative care encounter 2025     Abnormal CT of the chest 2025     Solitary pulmonary nodule on lung CT 2025     JAY treated with BiPAP 2024     Elevated troponin 2024     Abnormal CT of the abdomen 2024     Acute exacerbation of chronic obstructive pulmonary disease (COPD) (HCC) 2024     Chronic bilateral low back pain without sciatica 2024     Pulmonary hypertension (HCC) 2024     Aneurysm, carotid artery, internal 2024     Unspecified severe protein-calorie malnutrition (HCC) 01/15/2024     Macrocytosis 2023     Constipation 2023     Prediabetes 10/25/2023     Bilateral hearing loss 10/25/2023     PAC (premature atrial contraction) 2023     Vitamin B12 deficiency 2023     Vitamin D deficiency 2023     Weight loss 2022     Rectal bleeding 2021     Hyperlipidemia 2021     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     Dysphagia 2020     TIA (transient ischemic attack) 2020     S/P hernia repair 2019     Acquired renal cyst of left kidney 2019     Chronic diastolic (congestive) heart failure (HCC) 2019     Thrombocytopenia (HCC) 2018     Varicose veins of both lower extremities with pain 2018     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HCC) 2018     Acute on chronic respiratory failure with hypoxia and hypercapnia (HCC) 2018     Descending aortic aneurysm (HCC) 2018     History of DVT (deep vein thrombosis) 2018      Hypertension 02/01/2018     COPD, severe (HCC) 02/01/2018     Benign essential hypertension 08/02/2017       LOS (days): 6  Geometric Mean LOS (GMLOS) (days):   Days to GMLOS:     OBJECTIVE:  Risk of Unplanned Readmission Score: 26.32         Current admission status: Inpatient   Preferred Pharmacy:   [facility], [facility] [address]  [address] [facility] [address]  Phone: [phone] Fax: [phone]    [facility] - [place], [place] - [address]   [address] [place] [address]  Phone: [phone] Fax: [phone]    [facility] - [place], [place] - [address]  [address] [place] [address]  Phone: [phone] Fax: [phone]    Primary Care Provider: Kirby Casanova MD    Primary Insurance: [employer]ARE [facility]  Secondary Insurance: [facility]    DISCHARGE DETAILS:    Discharge planning discussed with:: Wife  Freedom of Choice: Yes  Comments - Freedom of Choice: Home, declining in-home HHC services  CM contacted family/caregiver?: Yes             Contacts  Patient Contacts: Viridiana  Relationship to Patient:: Family  Reason/Outcome: Emergency Contact, Discharge Planning    Requested Home Health Care         Is the patient interested in HHC at discharge?: No    DME Referral Provided  Referral made for DME?: No    Other Referral/Resources/Interventions Provided:  Interventions: None Indicated            Discharge Destination Plan:: Home  Transport at Discharge : Family                             IMM Given (Date):: 02/08/25  IMM Given to:: Family  Family notified:: Spouse Viridiana  Additional Comments: Informed by SLIM that pt is medically stable for d/c today. Pt now on 4L, was maintained on 3L at baseline pta. Home O2 study completed and revised order placed in [place]. Spoke with wife with  #[id number]. Wife agreeable to pt's d/c today. She is thankful for the improvement in his medical  status. She is aware he will now need 4L O2. She plans to provide transport and has the portable o2 at bedside. Previous CM notes indicate interest in HHC services. Reviewed with wife and she is declining home services at this time. IMM was reviewed with wife who reports understanding of the ability to appeal discharge if feeling as though pt is not medically stable for discharge. IMM was signed and placed in the scan bin.

## 2025-02-08 NOTE — PROGRESS NOTES
Progress Note - Pulmonology   Name: Severiano Feliciano 89 y.o. male I MRN: [id number]  Unit/Bed#: E4 -01 I Date of Admission: 2/2/2025   Date of Service: 2/8/2025 I Hospital Day: 6    Assessment & Plan  Acute exacerbation of chronic obstructive pulmonary disease (COPD) (HCC)  Improved to baseline oxygen  Wean oxygen for SPO2 >88%  Prednisone taper.  Discharge on prior therapy  Follow-up will be arranged  Acute on chronic respiratory failure with hypoxia and hypercapnia (HCC)  On home oxygen  Encouraged BiPAP use at home  Abnormal CT of the chest  -New RUL opacity on imaging; no leukocytosis, procalcitonin negative.  -Repeat procal negative  Solitary pulmonary nodule on lung CT  Outpatient follow-up    24 Hour Events : Doing better  Subjective : No complaints today    Objective :  Temp:  [97.1 °F (36.2 °C)-98 °F (36.7 °C)] 98 °F (36.7 °C)  HR:  [75-90] 81  BP: (131-163)/() 147/64  Resp:  [16-20] 18  SpO2:  [97 %-100 %] 97 %  O2 Device: Mid flow nasal cannula  Nasal Cannula O2 Flow Rate (L/min):  [4 L/min-10 L/min] 4 L/min    Physical Exam  Vitals and nursing note reviewed.   Constitutional:       General: He is not in acute distress.     Appearance: He is well-developed.   HENT:      Head: Normocephalic and atraumatic.   Eyes:      Conjunctiva/sclera: Conjunctivae normal.   Cardiovascular:      Rate and Rhythm: Normal rate and regular rhythm.      Heart sounds: No murmur heard.  Pulmonary:      Effort: Pulmonary effort is normal. No respiratory distress.      Comments: Diminished breath sounds  Abdominal:      Palpations: Abdomen is soft.      Tenderness: There is no abdominal tenderness.   Musculoskeletal:         General: No swelling.      Cervical back: Neck supple.   Skin:     General: Skin is warm and dry.      Capillary Refill: Capillary refill takes less than 2 seconds.   Neurological:      Mental Status: He is alert.   Psychiatric:         Mood and Affect: Mood normal.           Lab Results: I  have reviewed the following results:   .     02/08/25  0548   WBC 5.79   HGB 9.8*   HCT 33.6*   *   SODIUM 147   K 3.7      CO2 43*   BUN 19   CREATININE 0.64   GLUC 130   MG 2.0     ABG:   .     02/08/25  0311   PHART 7.398   RTG1HOB 61.8*   PO2ART 121.8   JQJ6DYZ 37.3*   BEART 10.5       Imaging Results Review: I personally reviewed the following image studies in PACS and associated radiology reports: chest xray and CT chest.   Other Study Results Review: EKG was reviewed.   PFT Results Reviewed: reviewed

## 2025-02-08 NOTE — PROGRESS NOTES
Progress Note - Nephrology   Name: Severiano Feliciano 89 y.o. male I MRN: [id number]  Unit/Bed#: E4 -01 I Date of Admission: 2/2/2025   Date of Service: 2/8/2025 I Hospital Day: 6     Assessment & Plan  Hypertension  Blood pressure is much better now 140s and 60 range diastolic  Continue amlodipine 5 mg daily for synergy with the losartan  Try to avoid diuretics if at all possible can always go up on the amlodipine if needed  Hypernatremia  Resolved at 147 recommend ongoing water intake discussed with family at bedside  Chronic diastolic (congestive) heart failure (HCC)  Not on any diuretic therapy, CT of the chest abdomen pelvis without contrast does not show any overt pulmonary edema: Holding any diuretics at the moment especially with the hypernatremia  Acute on chronic respiratory failure with hypoxia and hypercapnia (HCC)  Per primary service asymptomatic at this time  Acute exacerbation of chronic obstructive pulmonary disease (COPD) (HCC)  Being treated with prednisone 40 mg daily: Per primary service    I have reviewed the nephrology recommendations including continuing current treatment with antihypertensive regimen holding diuretics, with SLIM, and we are in agreement with renal plan including the information outlined above.   At this juncture we will see the patient as needed please feel free to call if any further questions or concerns!          Subjective   Brief History of Admission -we are seeing for hypertension and hyponatremia    Asymptomatic denies any chest pain or shortness of breath  No nausea vomiting or diarrhea  Eating  No urinary symptoms    Objective :  Temp:  [97.1 °F (36.2 °C)-98 °F (36.7 °C)] 98 °F (36.7 °C)  HR:  [75-90] 81  BP: (131-163)/() 147/64  Resp:  [16-20] 18  SpO2:  [97 %-100 %] 97 %  O2 Device: Mid flow nasal cannula  Nasal Cannula O2 Flow Rate (L/min):  [4 L/min-35 L/min] 4 L/min  FiO2 (%):  [45] 45    Current Weight: Weight - Scale: 60.3 kg (132 lb 15  oz)  First Weight: Weight - Scale: 60.3 kg (132 lb 15 oz)  I/O         02/06 0701  02/07 0700 02/07 0701  02/08 0700 02/08 0701  02/09 0700    Urine (mL/kg/hr) 100 (0.1)      Total Output 100      Net -100             Unmeasured Urine Occurrence 1 x            Physical Exam  Physical Exam: General:  No acute distress  Skin:  No acute rash  Eyes:  No scleral icterus and noninjected  ENT:  Moist mucous membranes  Neck:  Supple, no jugular venous distention, trachea midline, overall appearance is normal  Chest:  Clear to auscultation except for mild rhonchi at the bases at most  CVS:  Regular rate and rhythm, without a rub or gallops  Abdomen:  Normal bowel sounds, soft and nontender and nondistended  Extremities:  No edema, and no cyanosis, no significant arthritic changes  Neuro:  No gross focality  Psych:  Alert and oriented and appropriate    Medications:    Current Facility-Administered Medications:     acetaminophen (TYLENOL) tablet 975 mg, 975 mg, Oral, Q8H PRN, Calin Chacon PA-C    aluminum-magnesium hydroxide-simethicone (MAALOX) oral suspension 30 mL, 30 mL, Oral, Q4H PRN, Calin Chacon PA-C    amLODIPine (NORVASC) tablet 5 mg, 5 mg, Oral, Daily, Dave Pate MD, 5 mg at 02/08/25 0843    divalproex sodium (DEPAKOTE SPRINKLE) capsule 125 mg, 125 mg, Oral, HS PRN, 125 mg at 02/03/25 2248 **OR** valproate (DEPACON) 125 mg in sodium chloride 0.9 % 50 mL IVPB, 125 mg, Intravenous, HS PRN, Calin Chacon PA-C, Last Rate: 50 mL/hr at 02/02/25 2021, 125 mg at 02/02/25 2021    enoxaparin (LOVENOX) subcutaneous injection 40 mg, 40 mg, Subcutaneous, Daily, Phan Lutz MD, 40 mg at 02/08/25 0843    guaiFENesin (ROBITUSSIN) oral liquid 200 mg, 200 mg, Oral, Q4H PRN, Calin Chacon PA-C    ipratropium (ATROVENT) 0.02 % inhalation solution 0.5 mg, 0.5 mg, Nebulization, TID, Gabi Christiansen DO, 0.5 mg at 02/08/25 0723    levalbuterol (XOPENEX) inhalation solution 1.25 mg, 1.25 mg, Nebulization, TID, 1.25 mg at 02/08/25  "0723 **AND** [DISCONTINUED] sodium chloride 0.9 % inhalation solution 3 mL, 3 mL, Nebulization, TID, Phan Lutz MD, 3 mL at 02/02/25 2014    losartan (COZAAR) tablet 25 mg, 25 mg, Oral, BID, Dave Pate MD, 25 mg at 02/08/25 0843    melatonin tablet 3 mg, 3 mg, Oral, HS PRN, Calin Chacon PA-C, 3 mg at 02/04/25 2159    polyethylene glycol (MIRALAX) packet 17 g, 17 g, Oral, Daily PRN, Calin Chacon PA-C      Lab Results: I have reviewed the following results:  Results from last 7 days   Lab Units 02/08/25  0548 02/07/25  0542 02/06/25  0520 02/05/25  0547 02/04/25  0638 02/03/25  0501 02/02/25  0538 02/02/25  0456   WBC Thousand/uL 5.79 6.26 5.98 6.84 5.86 6.47  --  8.13   HEMOGLOBIN g/dL 9.8* 10.1* 10.4* 10.8* 10.8* 10.3*  --  11.2*   HEMATOCRIT % 33.6* 35.0* 36.6 38.1 37.6 35.7*  --  39.6   PLATELETS Thousands/uL 109* 87* 82* 95* 96* 86*  --  91*   POTASSIUM mmol/L 3.7 4.0 4.6 3.9 5.2 4.4  --  4.2   CHLORIDE mmol/L 100 101 101 101 99 97  --  93*   CO2 mmol/L 43* 39* >45* >45* >45* >45*  --  >45*   BUN mg/dL 19 20 25 31* 33* 26*  --  22   CREATININE mg/dL 0.64 0.70 0.79 0.75 0.83 0.85  --  0.81   CALCIUM mg/dL 9.3 9.0 9.1 9.1 9.5 9.4  --  10.1   MAGNESIUM mg/dL 2.0  --   --   --   --   --  2.4  --    PHOSPHORUS mg/dL  --   --   --   --   --   --  2.4  --    ALBUMIN g/dL  --   --   --   --   --   --   --  4.1       Administrative Statements     Portions of the record may have been created with voice recognition software. Occasional wrong word or \"sound a like\" substitutions may have occurred due to the inherent limitations of voice recognition software. Read the chart carefully and recognize, using context, where substitutions have occurred.If you have any questions, please contact the dictating provider.  "

## 2025-02-08 NOTE — DISCHARGE INSTR - AVS FIRST PAGE
Follow-ups:    -Follow-up with your PCP within 1 week of discharge  -Follow-up with with your pulmonology within 1 to 2 weeks of discharge    Medication changes:    -Amlodipine 5 mg added because of your high blood pressure continue to monitor blood pressure daily at a fixed relaxed time and discuss any further increase or decrease any medication regards to blood pressure with your primary care doctor  -Prednisone taper-take as prescribed to complete the tapering dose of prednisone    Avoid any sick contact with the patient so that his already end-stage COPD does not get flared up as that would not be good for the patient.      Findings on CAT scan  -8 x 5 mm right middle lobe nodule, most recently 5 x 3 mm (3/163) and about 4 x 2 mm in January 2024.   -Outpt follow up. Given age and severe co-morbidities, this likely does not need to be followed as per pulmonology.  -Discussed with your PCP for further plans in regards to management    Blood workup:  -BMP in 1 week postdischarge follow-up with PCP

## 2025-02-08 NOTE — DISCHARGE SUMMARY
"Discharge Summary - Hospitalist   Name: Severiano Feliciano 89 y.o. 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    Patient getting discharged today 2/8/25 after talking with family and pulmonology in regards to patient's progress future prognosis and expectations.  Patient is saturating well on mid flow.  Case management made aware and is working with the discharge  Acute exacerbation of chronic obstructive pulmonary disease (COPD) (HCC)  Patient has history of COPD FEV1 0.54 L 25% protected in 2024.  At baseline requires nebulization therapy, not on azithromycin due to hearing loss.    -Presented with acute exacerbation and altered mentation carbon dioxide retention.  -Continue with steroids, completed azithromycin  -Continue with nebulization.  -Palliative care consulted meeting tomorrow 2/4/25-patient did not wanted to discuss CODE STATUS or goals of care\" refused any further discussion said he will think about it by tomorrow  -Pulmonology on board appreciate recs, require outpatient follow-up appointment scheduled for 3/19  Chronic diastolic (congestive) heart failure (HCC)  Wt Readings from Last 3 Encounters:   02/02/25 60.3 kg (132 lb 15 oz)   11/05/24 60.1 kg (132 lb 9.6 oz)   09/20/24 59.3 kg (130 lb 12.8 oz)     Echo on 4/11/2023 reports normal ventricular cavity size with grade 1 diastolic dysfunction.  As per chart review patient takes torsemide 5 mg daily but as per wife and son there was some " issues with allergies to furosemide and torsemide.  The family said they will find out the paperwork to know the exact medication with which patient is allergic and then can be restarted tomorrow as family has to still get back.  Can be reassessed by PCP    Abnormal CT of the chest  Right middle lobe nodule endorsed in January 2024, outpatient follow-up not needed as per pulmonology  Solitary pulmonary nodule on lung CT  New right upper lobe opacity on imaging no leukocytosis procalcitonin negative  Pulmonology on board  Palliative care encounter  Palliative care has been on board   Family meeting 2/7/2025 at 1 PM with all the children and wife.  Patient's condition along with end-stage COPD and expectations were discussed.  CODE STATUS change was also discussed.  Patient's family wanted to see if he can decrease his oxygen down and take him home while keeping the CODE STATUS open for discussion for now an absolute note to palliative versus hospice.    Hypernatremia  Resolved       Medical Problems       Resolved Problems  Date Reviewed: 2/2/2025   None       Discharging Physician / Practitioner: Christina Price MD  PCP: Kirby Casanova MD  Admission Date:   Admission Orders (From admission, onward)       Ordered        02/02/25 0956  INPATIENT ADMISSION  Once                          Discharge Date: 02/08/25    Consultations During Hospital Stay:  Nephrology  Pulmonology  .  Palliative    Procedures Performed:   N/A    Significant Findings / Test Results:   XR chest portable  Result Date: 2/2/2025  Impression: Mild pulmonary venous congestion. Moderate opacity in the right lower lobe. See subsequent chest CT. Emphysema. Workstation performed: [id number]     CT chest abdomen pelvis w contrast  Result Date: 2/2/2025  Impression: 1. Patchy opacity posterolateral right upper lobe could represent atelectasis or pneumonia. Little change in chronic right lower lobe consolidation. 2. Background emphysema. Slowly enlarging  8 mm right middle lobe nodule. Possibility of neoplasm cannot be excluded. Based on current Fleischner Society 2017 Guidelines on incidental pulmonary nodule, follow-up non-contrast CT is recommended at 6 months from the initial examination and, if stable at that time, an additional follow-up is recommended for 18-24 months from the initial examination. 3. Thickening of the urinary bladder with hyperemia involving posterior lateral bladder diverticulum. Correlate with urinalysis regarding cystitis. 4. 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 No change in aneurysmal dilatation junction distal ascending aorta/arch. 5. Question thickening of the proximal esophagus. Consider esophagram. 6. Diffuse colonic diverticulosis without diverticulitis. Moderate to large amount of colonic stool suggesting constipation. 7. Cholelithiasis. Additional incidental findings as above. The study was marked in EPIC for follow-up. Workstation performed: [id number]     CT spine cervical without contrast  Result Date: 2/2/2025  Impression: No cervical spine fracture or traumatic malalignment. Workstation performed: [id number]     CT head without contrast  Result Date: 2/2/2025  Impression: No acute intracranial abnormality.  Chronic microangiopathic changes. 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 Workstation performed: [id number]         Incidental Findings:   -8 x 5 mm right middle lobe nodule, most recently 5 x 3 mm (3/163) and about 4 x 2 mm in January 2024.   -Outpt follow up. Given age and severe co-morbidities, this likely does not need to be followed as by pulmonology.    Test Results Pending at Discharge (will require follow up):   At     Outpatient Tests Requested:  N/A    Complications: N/A    Reason for Admission: Shortness of breath    Hospital Course:   Severiano Feliciano is a 89 y.o. male patient who  originally presented to the hospital on 2/2/2025 due to shortness of breath  Patient has end-stage COPD, and presented to the hospital with complaints of shortness of breath cough.  Patient was seen and examined blood workup and imaging was done.  Respiratory panel was negative blood cultures negative, patient was treated on the lines of acute exacerbation of COPD.  Given severity of COPD with FEV1 of 0.54 L 25% predicted in 2024 patient was started on IV steroids and nebulization, during the course of hospitalization patient's oxygen requirement ceased and was requiring high flow nasal cannula.  Patient's mentation would also worsen off and on as when he would get slightly better he would pull the mask out and would start retaining carbon oxide for which she would have to be strained.    Given the severity of his disease, a detailed family meeting was conducted family on 2/7/2025 discussions in light of  Patient's condition along with end-stage COPD and expectations were discussed.  CODE STATUS change was also discussed.  Patient's family wanted to see if he can decrease his oxygen down and how he does, it was reiterated that he does not look like he would perform well but patient's family wanted to see if he can tolerate 3 to 4 L currently planning to transition patient to mid flow and then see if patient can tolerate further go down.    Patient is being discharged home as requested from the family on mid flow with instructions to follow-up with his treatment regimen and outpatient follow-up with his PCP.  Long with follow-up with pulmonology.        Condition at Discharge: fair    Discharge Day Visit / Exam:   Subjective: Patient seen and examined at bedside.  States he wants to go home.  Still wheezing but states his shortness of breath is better  Vitals: Blood Pressure: 147/64 (02/08/25 0739)  Pulse: 81 (02/08/25 0739)  Temperature: 98 °F (36.7 °C) (02/08/25 0739)  Temp Source: Temporal (02/08/25  "7739)  Respirations: 18 (02/08/25 0739)  Height: 5' 2.99\" (160 cm) (02/02/25 0500)  Weight - Scale: 60.3 kg (132 lb 15 oz) (02/02/25 6719)  SpO2: 97 % (02/08/25 0739)  Physical Exam  Vitals and nursing note reviewed.   Constitutional:       General: He is not in acute distress.     Appearance: He is well-developed. He is ill-appearing.      Comments: Frail looking   HENT:      Head: Normocephalic and atraumatic.   Eyes:      Conjunctiva/sclera: Conjunctivae normal.   Cardiovascular:      Rate and Rhythm: Normal rate and regular rhythm.      Heart sounds: No murmur heard.  Pulmonary:      Effort: Pulmonary effort is normal. No respiratory distress.      Breath sounds: Wheezing present.   Abdominal:      General: Bowel sounds are normal.      Palpations: Abdomen is soft.      Tenderness: There is no abdominal tenderness.   Musculoskeletal:         General: No swelling.      Cervical back: Neck supple.   Skin:     General: Skin is warm and dry.      Capillary Refill: Capillary refill takes less than 2 seconds.   Neurological:      Mental Status: He is alert.      Comments: Alert and oriented to self   Psychiatric:         Mood and Affect: Mood normal.          Discussion with Family:  Wife at bedside son at phone.     Discharge instructions/Information to patient and family:   See after visit summary for information provided to patient and family.      Provisions for Follow-Up Care:  See after visit summary for information related to follow-up care and any pertinent home health orders.      Mobility at time of Discharge:   Basic Mobility Inpatient Raw Score: 15  -HL Goal: 4: Move to chair/commode  -HL Achieved: 4: Move to chair/commode       Disposition:   Home with [facility]NA Services (Reminder: Complete face to face encounter)    Planned Readmission: N/A    Discharge Medications:  See after visit summary for reconciled discharge medications provided to patient and/or family.      Administrative Statements   Discharge " Statement:  I have spent a total time of >40 minutes in caring for this patient on the day of the visit/encounter. >30 minutes of time was spent on: Diagnostic results, Prognosis, Risks and benefits of tx options, Instructions for management, Patient and family education, Importance of tx compliance, Risk factor reductions, Impressions, Counseling / Coordination of care, Documenting in the medical record, Reviewing / ordering tests, medicine, procedures  , and Communicating with other healthcare professionals .    **Please Note: This note may have been constructed using a voice recognition system**

## 2025-02-08 NOTE — RESTORATIVE TECHNICIAN NOTE
Restorative Technician Note      Patient Name: Severiano Feliciano     Restorative Tech Visit Date: 02/08/25  Note Type: Mobility  Patient Position Upon Consult: Supine  Activity Performed: Ambulated  Assistive Device: Roller walker  Patient Position at End of Consult: Bedside chair; All needs within reach; Bed/Chair alarm activated            ns

## 2025-02-08 NOTE — ASSESSMENT & PLAN NOTE
Improved to baseline oxygen  Wean oxygen for SPO2 >88%  Prednisone taper.  Discharge on prior therapy  Follow-up will be arranged

## 2025-02-08 NOTE — ASSESSMENT & PLAN NOTE
Wt Readings from Last 3 Encounters:   02/02/25 60.3 kg (132 lb 15 oz)   11/05/24 60.1 kg (132 lb 9.6 oz)   09/20/24 59.3 kg (130 lb 12.8 oz)     Echo on 4/11/2023 reports normal ventricular cavity size with grade 1 diastolic dysfunction.  As per chart review patient takes torsemide 5 mg daily but as per wife and son there was some issues with allergies to furosemide and torsemide.  The family said they will find out the paperwork to know the exact medication with which patient is allergic and then can be restarted tomorrow as family has to still get back.  Can be reassessed by PCP

## 2025-02-08 NOTE — ASSESSMENT & PLAN NOTE
Not on any diuretic therapy, CT of the chest abdomen pelvis without contrast does not show any overt pulmonary edema: Holding any diuretics at the moment especially with the hypernatremia

## 2025-02-08 NOTE — ASSESSMENT & PLAN NOTE
Blood pressure is much better now 140s and 60 range diastolic  Continue amlodipine 5 mg daily for synergy with the losartan  Try to avoid diuretics if at all possible can always go up on the amlodipine if needed

## 2025-02-08 NOTE — PLAN OF CARE
Problem: Potential for Falls  Goal: Patient will remain free of falls  Description: INTERVENTIONS:  - Educate patient/family on patient safety including physical limitations  - Instruct patient to call for assistance with activity   - Consult OT/PT to assist with strengthening/mobility   - Keep Call bell within reach  - Keep bed low and locked with side rails adjusted as appropriate  - Keep care items and personal belongings within reach  - Initiate and maintain comfort rounds  - Make Fall Risk Sign visible to staff  - Offer Toileting every 2 Hours, in advance of need  - Initiate/Maintain bed alarm  - Obtain necessary fall risk management equipment: In place   - Apply yellow socks and bracelet for high fall risk patients  - Consider moving patient to room near nurses station  Outcome: Progressing     Problem: PAIN - ADULT  Goal: Verbalizes/displays adequate comfort level or baseline comfort level  Description: Interventions:  - Encourage patient to monitor pain and request assistance  - Assess pain using appropriate pain scale  - Administer analgesics based on type and severity of pain and evaluate response  - Implement non-pharmacological measures as appropriate and evaluate response  - Consider cultural and social influences on pain and pain management  - Notify physician/advanced practitioner if interventions unsuccessful or patient reports new pain  Outcome: Progressing     Problem: DISCHARGE PLANNING  Goal: Discharge to home or other facility with appropriate resources  Description: INTERVENTIONS:  - Identify barriers to discharge w/patient and caregiver  - Arrange for needed discharge resources and transportation as appropriate  - Identify discharge learning needs (meds, wound care, etc.)  - Arrange for interpretive services to assist at discharge as needed  - Refer to Case Management Department for coordinating discharge planning if the patient needs post-hospital services based on physician/advanced  practitioner order or complex needs related to functional status, cognitive ability, or social support system  Outcome: Progressing     Problem: Knowledge Deficit  Goal: Patient/family/caregiver demonstrates understanding of disease process, treatment plan, medications, and discharge instructions  Description: Complete learning assessment and assess knowledge base.  Interventions:  - Provide teaching at level of understanding  - Provide teaching via preferred learning methods  Outcome: Progressing     Problem: RESPIRATORY - ADULT  Goal: Achieves optimal ventilation and oxygenation  Description: INTERVENTIONS:  - Assess for changes in respiratory status  - Assess for changes in mentation and behavior  - Position to facilitate oxygenation and minimize respiratory effort  - Oxygen administered by appropriate delivery if ordered  - Initiate smoking cessation education as indicated  - Encourage broncho-pulmonary hygiene including cough, deep breathe, Incentive Spirometry  - Assess the need for suctioning and aspirate as needed  - Assess and instruct to report SOB or any respiratory difficulty  - Respiratory Therapy support as indicated  Outcome: Progressing     Problem: Prexisting or High Potential for Compromised Skin Integrity  Goal: Skin integrity is maintained or improved  Description: INTERVENTIONS:  - Identify patients at risk for skin breakdown  - Assess and monitor skin integrity  - Assess and monitor nutrition and hydration status  - Monitor labs   - Assess for incontinence   - Turn and reposition patient  - Assist with mobility/ambulation  - Relieve pressure over bony prominences  - Avoid friction and shearing  - Provide appropriate hygiene as needed including keeping skin clean and dry  - Evaluate need for skin moisturizer/barrier cream  - Collaborate with interdisciplinary team   - Patient/family teaching  - Consider wound care consult   Outcome: Progressing     Problem: SAFETY,RESTRAINT: NV/NON-SELF  DESTRUCTIVE BEHAVIOR  Goal: Remains free of harm/injury (restraint for non violent/non self-detsructive behavior)  Description: INTERVENTIONS:  - Instruct patient/family regarding restraint use   - Assess and monitor physiologic and psychological status   - Provide interventions and comfort measures to meet assessed patient needs   - Identify and implement measures to help patient regain control  - Assess readiness for release of restraint   Outcome: Progressing  Goal: Returns to optimal restraint-free functioning  Description: INTERVENTIONS:  - Assess the patient's behavior and symptoms that indicate continued need for restraint  - Identify and implement measures to help patient regain control  - Assess readiness for release of restraint   Outcome: Progressing

## 2025-02-08 NOTE — ASSESSMENT & PLAN NOTE
Palliative care has been on board   Family meeting 2/7/2025 at 1 PM with all the children and wife.  Patient's condition along with end-stage COPD and expectations were discussed.  CODE STATUS change was also discussed.  Patient's family wanted to see if he can decrease his oxygen down and take him home while keeping the CODE STATUS open for discussion for now an absolute note to palliative versus hospice.

## 2025-02-08 NOTE — ASSESSMENT & PLAN NOTE
Currently requiring mid flow at 4 L/min  Secondary to COPD exacerbation  Continue to wean off supplemental oxygen to maintain SpO2 greater than 88%  At baseline takes 3 L supplemental oxygen with exertion.    Patient getting discharged today 2/8/25 after talking with family and pulmonology in regards to patient's progress future prognosis and expectations.  Patient is saturating well on mid flow.  Case management made aware and is working with the discharge

## 2025-02-08 NOTE — RESPIRATORY THERAPY NOTE
Home Oxygen Qualifying Test     Patient name: Severiano Feliciano        : 1935   Date of Test:  2025  Diagnosis:    Home Oxygen Test:    **Medicare Guidelines require item(s) 1-5 on all ambulatory patients or 1 and 2 on non-ambulatory patients.    1. Baseline SPO2 on Room Air at rest 83 %   If <= 88% on Room Air add O2 via NC to obtain SpO2 >=88%. If LPM needed, document LPM 4 needed to reach =>88%    SPO2 during exertion on Room Air N/A  During exertion monitor SPO2. If SPO2 increases >=89%, do not add supplemental oxygen    SPO2 on Oxygen at Rest 93 % at 4 LPM    SPO2 during exertion on Oxygen 91 % at 4 LPM    Test performed during exertion activity. Home oxygen test     [x]  Supplemental Home Oxygen is indicated.    []  Client does not qualify for home oxygen.    Respiratory Additional Notes- Patient ambulated in room with assistance of walker and resortative therapist.  Patient requires 4 Lpm during rest and ambulation    Cat Garcias, RT

## 2025-02-10 ENCOUNTER — TELEPHONE (OUTPATIENT)
Dept: PALLIATIVE MEDICINE | Facility: CLINIC | Age: 89
End: 2025-02-10

## 2025-02-10 ENCOUNTER — TRANSITIONAL CARE MANAGEMENT (OUTPATIENT)
Dept: FAMILY MEDICINE CLINIC | Facility: CLINIC | Age: 89
End: 2025-02-10

## 2025-02-11 NOTE — UTILIZATION REVIEW
NOTIFICATION OF ADMISSION DISCHARGE   This is a Notification of Discharge from [facility]. Please be advised that this patient has been discharge from our facility. Below you will find the admission and discharge date and time including the patient’s disposition.   UTILIZATION REVIEW CONTACT:  Tamika Dominguez  Utilization   Network Utilization Review Department  Phone: [phone] x carefully listen to the prompts. All voicemails are confidential.  Email: [email or web address]     ADMISSION INFORMATION  PRESENTATION DATE: 2/2/2025  4:47 AM  OBERVATION ADMISSION DATE: N/A  INPATIENT ADMISSION DATE: 2/2/25  9:56 AM   DISCHARGE DATE: 2/8/2025  1:35 PM   DISPOSITION:Home/Self Care    Network Utilization Review Department  ATTENTION: Please call with any questions or concerns to [phone] and carefully listen to the prompts so that you are directed to the right person. All voicemails are confidential.   For Discharge needs, contact Care Management DC Support Team at [phone] opt. 2  Send all requests for admission clinical reviews, approved or denied determinations and any other requests to dedicated fax number below belonging to the campus where the patient is receiving treatment. List of dedicated fax numbers for the Facilities:  FACILITY NAME UR FAX NUMBER   ADMISSION DENIALS (Administrative/Medical Necessity) [phone]   DISCHARGE SUPPORT TEAM ([facility]) [phone]   PARENT CHILD HEALTH (Maternity/NICU/Pediatrics) [phone]   [facility] [phone]   General acute hospital [phone]   [facility] [phone]   [facility] [phone]   [facility] [phone]   [facility] [phone]   [facility] [phone]   [facility] [phone]   [facility]  [facility] [phone]   [facility] [phone]   [facility] [phone]   [facility] [phone]

## 2025-02-12 NOTE — TELEPHONE ENCOUNTER
2nd message  Left a message for patient to call office back to schedule a hospital follow up  appointment with Palliative Care. (IN Setswana?

## 2025-02-13 ENCOUNTER — OFFICE VISIT (OUTPATIENT)
Dept: FAMILY MEDICINE CLINIC | Facility: CLINIC | Age: 89
End: 2025-02-13
Payer: MEDICARE

## 2025-02-13 VITALS
HEART RATE: 88 BPM | DIASTOLIC BLOOD PRESSURE: 70 MMHG | BODY MASS INDEX: 23.39 KG/M2 | SYSTOLIC BLOOD PRESSURE: 120 MMHG | HEIGHT: 63 IN | TEMPERATURE: 98.6 F | WEIGHT: 132 LBS | OXYGEN SATURATION: 91 % | RESPIRATION RATE: 24 BRPM

## 2025-02-13 DIAGNOSIS — E11.51 TYPE 2 DIABETES MELLITUS WITH DIABETIC PERIPHERAL ANGIOPATHY WITHOUT GANGRENE, WITHOUT LONG-TERM CURRENT USE OF INSULIN (HCC): ICD-10-CM

## 2025-02-13 DIAGNOSIS — E87.0 HYPERNATREMIA: ICD-10-CM

## 2025-02-13 DIAGNOSIS — I10 BENIGN ESSENTIAL HYPERTENSION: ICD-10-CM

## 2025-02-13 DIAGNOSIS — F01.B0 MODERATE VASCULAR DEMENTIA WITHOUT BEHAVIORAL DISTURBANCE, PSYCHOTIC DISTURBANCE, MOOD DISTURBANCE, OR ANXIETY (HCC): ICD-10-CM

## 2025-02-13 DIAGNOSIS — Z79.52 LONG TERM (CURRENT) USE OF SYSTEMIC STEROIDS: ICD-10-CM

## 2025-02-13 DIAGNOSIS — K59.04 CHRONIC IDIOPATHIC CONSTIPATION: ICD-10-CM

## 2025-02-13 DIAGNOSIS — Z76.89 ENCOUNTER FOR SUPPORT AND COORDINATION OF TRANSITION OF CARE: Primary | ICD-10-CM

## 2025-02-13 PROBLEM — I83.813 VARICOSE VEINS OF BOTH LOWER EXTREMITIES WITH PAIN: Status: RESOLVED | Noted: 2018-05-23 | Resolved: 2025-02-13

## 2025-02-13 PROBLEM — D75.89 MACROCYTOSIS: Status: RESOLVED | Noted: 2023-12-07 | Resolved: 2025-02-13

## 2025-02-13 PROBLEM — E53.8 VITAMIN B12 DEFICIENCY: Status: RESOLVED | Noted: 2023-04-17 | Resolved: 2025-02-13

## 2025-02-13 PROBLEM — R93.5 ABNORMAL CT OF THE ABDOMEN: Status: RESOLVED | Noted: 2024-05-21 | Resolved: 2025-02-13

## 2025-02-13 PROBLEM — E78.5 HYPERLIPIDEMIA: Status: RESOLVED | Noted: 2021-08-05 | Resolved: 2025-02-13

## 2025-02-13 PROBLEM — Z86.718 HISTORY OF DVT (DEEP VEIN THROMBOSIS): Status: RESOLVED | Noted: 2018-02-01 | Resolved: 2025-02-13

## 2025-02-13 PROBLEM — G89.29 CHRONIC BILATERAL LOW BACK PAIN WITHOUT SCIATICA: Status: RESOLVED | Noted: 2024-03-25 | Resolved: 2025-02-13

## 2025-02-13 PROBLEM — R93.89 ABNORMAL CT OF THE CHEST: Status: RESOLVED | Noted: 2025-02-02 | Resolved: 2025-02-13

## 2025-02-13 PROBLEM — R63.4 WEIGHT LOSS: Status: RESOLVED | Noted: 2022-12-06 | Resolved: 2025-02-13

## 2025-02-13 PROBLEM — G45.9 TIA (TRANSIENT ISCHEMIC ATTACK): Status: RESOLVED | Noted: 2020-05-28 | Resolved: 2025-02-13

## 2025-02-13 PROBLEM — I72.4 POPLITEAL ARTERY ANEURYSM, BILATERAL (HCC): Status: RESOLVED | Noted: 2018-05-23 | Resolved: 2025-02-13

## 2025-02-13 PROBLEM — K59.00 CONSTIPATION: Status: RESOLVED | Noted: 2023-11-21 | Resolved: 2025-02-13

## 2025-02-13 PROBLEM — E55.9 VITAMIN D DEFICIENCY: Status: RESOLVED | Noted: 2023-04-17 | Resolved: 2025-02-13

## 2025-02-13 PROBLEM — N28.1 ACQUIRED RENAL CYST OF LEFT KIDNEY: Status: RESOLVED | Noted: 2019-01-21 | Resolved: 2025-02-13

## 2025-02-13 PROBLEM — Z87.19 S/P HERNIA REPAIR: Status: RESOLVED | Noted: 2019-02-03 | Resolved: 2025-02-13

## 2025-02-13 PROBLEM — I49.1 PAC (PREMATURE ATRIAL CONTRACTION): Status: RESOLVED | Noted: 2023-09-28 | Resolved: 2025-02-13

## 2025-02-13 PROBLEM — Z98.890 S/P HERNIA REPAIR: Status: RESOLVED | Noted: 2019-02-03 | Resolved: 2025-02-13

## 2025-02-13 PROBLEM — M54.50 CHRONIC BILATERAL LOW BACK PAIN WITHOUT SCIATICA: Status: RESOLVED | Noted: 2024-03-25 | Resolved: 2025-02-13

## 2025-02-13 PROBLEM — E43 UNSPECIFIED SEVERE PROTEIN-CALORIE MALNUTRITION (HCC): Status: RESOLVED | Noted: 2024-01-15 | Resolved: 2025-02-13

## 2025-02-13 PROBLEM — R73.03 PREDIABETES: Status: RESOLVED | Noted: 2023-10-25 | Resolved: 2025-02-13

## 2025-02-13 PROBLEM — K62.5 RECTAL BLEEDING: Status: RESOLVED | Noted: 2021-08-05 | Resolved: 2025-02-13

## 2025-02-13 PROCEDURE — 99495 TRANSJ CARE MGMT MOD F2F 14D: CPT | Performed by: FAMILY MEDICINE

## 2025-02-13 RX ORDER — LOSARTAN POTASSIUM 25 MG/1
25 TABLET ORAL 2 TIMES DAILY
Qty: 180 TABLET | Refills: 1 | Status: SHIPPED | OUTPATIENT
Start: 2025-02-13

## 2025-02-13 RX ORDER — AMOXICILLIN 250 MG
1 CAPSULE ORAL DAILY
Qty: 30 TABLET | Refills: 5 | Status: SHIPPED | OUTPATIENT
Start: 2025-02-13

## 2025-02-13 RX ORDER — FORMOTEROL FUMARATE DIHYDRATE 20 UG/2ML
SOLUTION RESPIRATORY (INHALATION)
COMMUNITY
Start: 2025-01-12 | End: 2025-02-13 | Stop reason: ALTCHOICE

## 2025-02-13 RX ORDER — ALBUTEROL SULFATE AND BUDESONIDE 90; 80 UG/1; UG/1
2 AEROSOL, METERED RESPIRATORY (INHALATION) 2 TIMES DAILY
COMMUNITY
Start: 2024-12-30 | End: 2025-02-13 | Stop reason: ALTCHOICE

## 2025-02-13 RX ORDER — PANTOPRAZOLE SODIUM 40 MG/1
40 TABLET, DELAYED RELEASE ORAL DAILY
Qty: 30 TABLET | Refills: 5 | Status: SHIPPED | OUTPATIENT
Start: 2025-02-13

## 2025-02-13 NOTE — ASSESSMENT & PLAN NOTE
Lab Results   Component Value Date    HGBA1C 6.2 (H) 09/05/2024   Severiano is  showing improved control of hyperglycemia, as indicated by the HbA1C percentage mentioned above. He expressed concerns about statin therapy and kidney protection treatments. We discussed our shared treatment goals, which he accepted. The primary objective is to reduce the risk of vascular disease and its complications. I explained the potential complications of uncontrolled diabetes and the symptoms of hypoglycemia. We emphasized the importance of follow-up with diabetes educators and making lifestyle modifications. I encouraged him to remain compliant with his treatment plan and to call if he experiences any side effects. Additionally, I requested that he bring a blood sugar logbook to his next appointment.

## 2025-02-13 NOTE — PROGRESS NOTES
Transition of Care Visit  Name: Severiano Feliciano      : 1935      MRN: [id number]  Encounter Provider: Kirby Casanova MD  Encounter Date: 2025   Encounter department: [facility] C[facility]    Assessment & Plan  Encounter for support and coordination of transition of care  COPD with Recent Exacerbation:  - Patient recently discharged after 8-day hospitalization for acute hypercapnic respiratory failure  - Currently at baseline with approximately 25% lung function bilaterally  - Continue home oxygen therapy  - Nebulizer treatments BID with Duoneb and Pulmicort; increase to TID if symptoms worsen  - Discontinue oral antibiotics as hospital course completed.  He is not able to effectively use INHALERS.     Volume Status:  - New prescription for Torsemide PRN for peripheral edema  - Discontinued previous diuretics due to allergies (furosemide)     Hypertension:  - Continue Losartan  - Noted allergy to amlodipine     Medications:  - Continue prednisone 5mg daily  - Continue PPI for gastric protection  - Continue Vitamin D supplementation     Advanced Care Planning:  - Family opts to continue full care at home  - Declined hospice services at this time  - Family aware of disease progression and prognosis       Chronic idiopathic constipation    Orders:    senna-docusate sodium (Senna Plus) 8.6-50 mg per tablet; Take 1 tablet by mouth daily    Long term (current) use of systemic steroids    Orders:    pantoprazole (PROTONIX) 40 mg tablet; Take 1 tablet (40 mg total) by mouth daily    Benign essential hypertension    Orders:    losartan (COZAAR) 25 mg tablet; Take 1 tablet (25 mg total) by mouth 2 (two) times a day    Type 2 diabetes mellitus with diabetic peripheral angiopathy without gangrene, without long-term current use of insulin ([facility])    Lab Results   Component Value Date    HGBA1C 6.2 (H) 2024   Severiano is  showing improved control of hyperglycemia, as indicated  by the HbA1C percentage mentioned above. He expressed concerns about statin therapy and kidney protection treatments. We discussed our shared treatment goals, which he accepted. The primary objective is to reduce the risk of vascular disease and its complications. I explained the potential complications of uncontrolled diabetes and the symptoms of hypoglycemia. We emphasized the importance of follow-up with diabetes educators and making lifestyle modifications. I encouraged him to remain compliant with his treatment plan and to call if he experiences any side effects. Additionally, I requested that he bring a blood sugar logbook to his next appointment.            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 Education and support for both the patient and family are crucial, along with the limitation for lifestyle modifications like a heart-healthy diet and regular physical activity due to his end of Stage COPD. Limited intervention.       Hypernatremia  Reassess hospital numbers. Avoid diuresis for now. He is dry.  Orders:    Basic metabolic panel; Future         History of Present Illness     Transitional Care Management Review:   Severiano Feliciano is a 89 y.o. male here for TCM follow up.     During the TCM phone call patient stated:  TCM Call       Date and time call was made  2/10/2025 10:17 AM    Hospital care reviewed  Records reviewed    Patient [facility] hospitial[facility] at  [facility]    Date of Admission  02/02/25    Date of discharge  02/08/25    Diagnosis   "Chronic obstructive pulmonary disease with acute exacerbation    Disposition  Home    Were the patients medications reviewed and updated  Yes    Current Symptoms  None          TCM Call       Post hospital issues  None    Should patient be enrolled in anticoag monitoring?  No    Scheduled for follow up?  Yes    Patients specialists  [facility]    Did you obtain your prescribed medications  Yes    Do you need help managing your prescriptions or medications  No    Is transportation to your appointment needed  No    I have advised the patient to call PCP with any new or worsening symptoms  Cecile ARGUELLO    Living Arrangements  Family members    Have you fallen in the last 12 months  No    Interperter language line needed  No          89-year-old male presents for follow-up after recent hospitalization for COPD exacerbation with respiratory failure. Patient was hospitalized for 8 days with acute hypercapnic respiratory failure and hypoxemia. Family reports patient is now at baseline, though still requiring continuous oxygen therapy. Using nebulizer treatments with good response. No current complaints of increased dyspnea, though baseline dyspnea remains present due to severe COPD. Family reports managing secretions through nebulizer treatments and postural drainage. Patient has documented allergies to penicillin, lisinopril, furosemide, and amlodipine. Family expresses strong preference for home care and has declined hospice services, though aware of disease progression. Sleep is interrupted due to oxygen mask displacement.      Review of Systems  Objective   /70 (BP Location: Left arm, Patient Position: Sitting, Cuff Size: Standard)   Pulse 88   Temp 98.6 °F (37 °C) (Tympanic)   Resp (!) 24   Ht 5' 3\" (1.6 m)   Wt 59.9 kg (132 lb)   SpO2 91% Comment: 4 liters oxygen  BMI 23.38 kg/m²     Physical Exam  Objective: Son Daljit and his wife with him.  Constitutional: Elderly male in no acute distress at " rest on home oxygen  Respiratory:  - Chronic decreased breath sounds bilaterally  - Using accessory muscles for breathing at baseline  - Productive cough with thick secretions  - Requires continuous oxygen therapy  Medications have been reviewed by provider in current encounter    Administrative Statements   I have spent a total time of 25 minutes in caring for this patient on the day of the visit/encounter including Diagnostic results, Risks and benefits of tx options, and Patient and family education. Topics discussed with the patient / family include advanced directives.

## 2025-02-13 NOTE — ASSESSMENT & PLAN NOTE
The management of moderate vascular-hypoxic dementia without behavioral, psychotic, mood, or anxiety disturbances involves a comprehensive approach that includes controlling vascular risk factors (such as hypertension, diabetes, and hyperlipidemia), considering cognitive enhancers, and providing cognitive rehabilitation and occupational therapy to support daily functioning. Education and support for both the patient and family are crucial, along with the limitation for lifestyle modifications like a heart-healthy diet and regular physical activity due to his end of Stage COPD. Limited intervention.

## 2025-02-14 PROBLEM — Z76.89 ENCOUNTER FOR SUPPORT AND COORDINATION OF TRANSITION OF CARE: Status: ACTIVE | Noted: 2025-02-03

## 2025-02-14 LAB
DME PARACHUTE DELIVERY DATE REQUESTED: NORMAL
DME PARACHUTE ITEM DESCRIPTION: NORMAL
DME PARACHUTE ITEM DESCRIPTION: NORMAL
DME PARACHUTE ORDER STATUS: NORMAL
DME PARACHUTE SUPPLIER NAME: NORMAL
DME PARACHUTE SUPPLIER PHONE: NORMAL

## 2025-02-14 NOTE — ASSESSMENT & PLAN NOTE
COPD with Recent Exacerbation:  - Patient recently discharged after 8-day hospitalization for acute hypercapnic respiratory failure  - Currently at baseline with approximately 25% lung function bilaterally  - Continue home oxygen therapy  - Nebulizer treatments BID with Duoneb and Pulmicort; increase to TID if symptoms worsen  - Discontinue oral antibiotics as hospital course completed.  He is not able to effectively use INHALERS.     Volume Status:  - New prescription for Torsemide PRN for peripheral edema  - Discontinued previous diuretics due to allergies (furosemide)     Hypertension:  - Continue Losartan  - Noted allergy to amlodipine     Medications:  - Continue prednisone 5mg daily  - Continue PPI for gastric protection  - Continue Vitamin D supplementation     Advanced Care Planning:  - Family opts to continue full care at home  - Declined hospice services at this time  - Family aware of disease progression and prognosis

## 2025-03-09 ENCOUNTER — APPOINTMENT (EMERGENCY)
Dept: RADIOLOGY | Facility: HOSPITAL | Age: 89
DRG: 871 | End: 2025-03-09
Payer: MEDICARE

## 2025-03-09 ENCOUNTER — APPOINTMENT (EMERGENCY)
Dept: CT IMAGING | Facility: HOSPITAL | Age: 89
DRG: 871 | End: 2025-03-09
Payer: MEDICARE

## 2025-03-09 ENCOUNTER — HOSPITAL ENCOUNTER (INPATIENT)
Facility: HOSPITAL | Age: 89
LOS: 6 days | Discharge: HOME | DRG: 871 | End: 2025-03-15
Attending: INTERNAL MEDICINE | Admitting: INTERNAL MEDICINE
Payer: MEDICARE

## 2025-03-09 ENCOUNTER — APPOINTMENT (INPATIENT)
Dept: GASTROENTEROLOGY | Facility: HOSPITAL | Age: 89
DRG: 871 | End: 2025-03-09
Attending: INTERNAL MEDICINE
Payer: MEDICARE

## 2025-03-09 ENCOUNTER — HOSPITAL ENCOUNTER (EMERGENCY)
Facility: HOSPITAL | Age: 89
DRG: 871 | End: 2025-03-09
Attending: EMERGENCY MEDICINE
Payer: MEDICARE

## 2025-03-09 VITALS
SYSTOLIC BLOOD PRESSURE: 127 MMHG | RESPIRATION RATE: 26 BRPM | DIASTOLIC BLOOD PRESSURE: 67 MMHG | TEMPERATURE: 100.4 F | OXYGEN SATURATION: 96 % | HEART RATE: 89 BPM

## 2025-03-09 DIAGNOSIS — R13.10 DYSPHAGIA: ICD-10-CM

## 2025-03-09 DIAGNOSIS — R57.9 SHOCK (HCC): ICD-10-CM

## 2025-03-09 DIAGNOSIS — J96.22 ACUTE ON CHRONIC RESPIRATORY FAILURE WITH HYPOXIA AND HYPERCAPNIA (HCC): Primary | ICD-10-CM

## 2025-03-09 DIAGNOSIS — G47.00 INSOMNIA: ICD-10-CM

## 2025-03-09 DIAGNOSIS — J96.90 RESPIRATORY FAILURE (HCC): ICD-10-CM

## 2025-03-09 DIAGNOSIS — J96.21 ACUTE ON CHRONIC RESPIRATORY FAILURE WITH HYPOXIA AND HYPERCAPNIA (HCC): Primary | ICD-10-CM

## 2025-03-09 DIAGNOSIS — R06.89 HYPERCAPNIA: ICD-10-CM

## 2025-03-09 DIAGNOSIS — R41.82 ALTERED MENTAL STATUS, UNSPECIFIED ALTERED MENTAL STATUS TYPE: Primary | ICD-10-CM

## 2025-03-09 DIAGNOSIS — J44.9 CHRONIC OBSTRUCTIVE PULMONARY DISEASE, UNSPECIFIED COPD TYPE (HCC): ICD-10-CM

## 2025-03-09 DIAGNOSIS — J18.9 PNEUMONIA: ICD-10-CM

## 2025-03-09 LAB
2HR DELTA HS TROPONIN: 4 NG/L
ALBUMIN SERPL BCG-MCNC: 3.4 G/DL (ref 3.5–5)
ALBUMIN SERPL BCG-MCNC: 3.9 G/DL (ref 3.5–5)
ALP SERPL-CCNC: 45 U/L (ref 34–104)
ALP SERPL-CCNC: 65 U/L (ref 34–104)
ALT SERPL W P-5'-P-CCNC: 18 U/L (ref 7–52)
ALT SERPL W P-5'-P-CCNC: 18 U/L (ref 7–52)
ANION GAP SERPL CALCULATED.3IONS-SCNC: 7 MMOL/L (ref 4–13)
APTT PPP: 25 SECONDS (ref 23–34)
ARTERIAL PATENCY WRIST A: YES
ARTERIAL PATENCY WRIST A: YES
AST SERPL W P-5'-P-CCNC: 27 U/L (ref 13–39)
AST SERPL W P-5'-P-CCNC: 30 U/L (ref 13–39)
ATRIAL RATE: 109 BPM
ATRIAL RATE: 109 BPM
BACTERIA UR QL AUTO: ABNORMAL /HPF
BASE EXCESS BLDA CALC-SCNC: 11.6 MMOL/L
BASE EXCESS BLDA CALC-SCNC: 13.8 MMOL/L
BASE EXCESS BLDA CALC-SCNC: 14.2 MMOL/L
BASE EXCESS BLDA CALC-SCNC: 7 MMOL/L
BASOPHILS # BLD AUTO: 0.01 THOUSANDS/ÂΜL (ref 0–0.1)
BASOPHILS # BLD AUTO: 0.05 THOUSANDS/ÂΜL (ref 0–0.1)
BASOPHILS NFR BLD AUTO: 0 % (ref 0–1)
BASOPHILS NFR BLD AUTO: 0 % (ref 0–1)
BILIRUB DIRECT SERPL-MCNC: 0.28 MG/DL (ref 0–0.2)
BILIRUB SERPL-MCNC: 0.56 MG/DL (ref 0.2–1)
BILIRUB SERPL-MCNC: 0.92 MG/DL (ref 0.2–1)
BILIRUB UR QL STRIP: NEGATIVE
BNP SERPL-MCNC: 62 PG/ML (ref 0–100)
BUN SERPL-MCNC: 18 MG/DL (ref 5–25)
BUN SERPL-MCNC: 19 MG/DL (ref 5–25)
CA-I BLD-SCNC: 1.02 MMOL/L (ref 1.12–1.32)
CALCIUM SERPL-MCNC: 8.6 MG/DL (ref 8.4–10.2)
CALCIUM SERPL-MCNC: 9.5 MG/DL (ref 8.4–10.2)
CARDIAC TROPONIN I PNL SERPL HS: 19 NG/L (ref ?–50)
CARDIAC TROPONIN I PNL SERPL HS: 23 NG/L (ref ?–50)
CHLORIDE SERPL-SCNC: 100 MMOL/L (ref 96–108)
CHLORIDE SERPL-SCNC: 92 MMOL/L (ref 96–108)
CLARITY UR: CLEAR
CO2 SERPL-SCNC: 37 MMOL/L (ref 21–32)
CO2 SERPL-SCNC: >45 MMOL/L (ref 21–32)
COLOR UR: ABNORMAL
CREAT SERPL-MCNC: 0.68 MG/DL (ref 0.6–1.3)
CREAT SERPL-MCNC: 0.75 MG/DL (ref 0.6–1.3)
D DIMER PPP FEU-MCNC: 3.51 UG/ML FEU
EOSINOPHIL # BLD AUTO: 0.05 THOUSAND/ÂΜL (ref 0–0.61)
EOSINOPHIL # BLD AUTO: 0.16 THOUSAND/ÂΜL (ref 0–0.61)
EOSINOPHIL NFR BLD AUTO: 0 % (ref 0–6)
EOSINOPHIL NFR BLD AUTO: 2 % (ref 0–6)
ERYTHROCYTE [DISTWIDTH] IN BLOOD BY AUTOMATED COUNT: 12.8 % (ref 11.6–15.1)
ERYTHROCYTE [DISTWIDTH] IN BLOOD BY AUTOMATED COUNT: 13 % (ref 11.6–15.1)
EST. AVERAGE GLUCOSE BLD GHB EST-MCNC: 128 MG/DL
FLUAV AG UPPER RESP QL IA.RAPID: NEGATIVE
FLUBV AG UPPER RESP QL IA.RAPID: NEGATIVE
GFR SERPL CREATININE-BSD FRML MDRD: 82 ML/MIN/1.73SQ M
GFR SERPL CREATININE-BSD FRML MDRD: 85 ML/MIN/1.73SQ M
GLUCOSE SERPL-MCNC: 123 MG/DL (ref 65–140)
GLUCOSE SERPL-MCNC: 128 MG/DL (ref 65–140)
GLUCOSE SERPL-MCNC: 133 MG/DL (ref 65–140)
GLUCOSE SERPL-MCNC: 136 MG/DL (ref 65–140)
GLUCOSE SERPL-MCNC: 142 MG/DL (ref 65–140)
GLUCOSE UR STRIP-MCNC: NEGATIVE MG/DL
HBA1C MFR BLD: 6.1 %
HCO3 BLDA-SCNC: 34.8 MMOL/L (ref 22–28)
HCO3 BLDA-SCNC: 35.6 MMOL/L (ref 22–28)
HCO3 BLDA-SCNC: 38.4 MMOL/L (ref 22–28)
HCO3 BLDA-SCNC: 40.2 MMOL/L (ref 22–28)
HCT VFR BLD AUTO: 30.6 % (ref 36.5–49.3)
HCT VFR BLD AUTO: 40.1 % (ref 36.5–49.3)
HGB BLD-MCNC: 10.9 G/DL (ref 12–17)
HGB BLD-MCNC: 8.7 G/DL (ref 12–17)
HGB UR QL STRIP.AUTO: NEGATIVE
HOROWITZ INDEX BLDA+IHG-RTO: 100 MM[HG]
HOROWITZ INDEX BLDA+IHG-RTO: 70 MM[HG]
HYALINE CASTS #/AREA URNS LPF: ABNORMAL /LPF
IMM GRANULOCYTES # BLD AUTO: 0.02 THOUSAND/UL (ref 0–0.2)
IMM GRANULOCYTES # BLD AUTO: 0.1 THOUSAND/UL (ref 0–0.2)
IMM GRANULOCYTES NFR BLD AUTO: 0 % (ref 0–2)
IMM GRANULOCYTES NFR BLD AUTO: 1 % (ref 0–2)
INR PPP: 0.9 (ref 0.85–1.19)
INR PPP: 1.12 (ref 0.85–1.19)
KETONES UR STRIP-MCNC: NEGATIVE MG/DL
LACTATE SERPL-SCNC: 1.7 MMOL/L (ref 0.5–2)
LACTATE SERPL-SCNC: 2.2 MMOL/L (ref 0.5–2)
LACTATE SERPL-SCNC: 2.5 MMOL/L (ref 0.5–2)
LACTATE SERPL-SCNC: 2.8 MMOL/L (ref 0.5–2)
LEUKOCYTE ESTERASE UR QL STRIP: NEGATIVE
LIPASE SERPL-CCNC: 21 U/L (ref 11–82)
LYMPHOCYTES # BLD AUTO: 1.19 THOUSANDS/ÂΜL (ref 0.6–4.47)
LYMPHOCYTES # BLD AUTO: 1.46 THOUSANDS/ÂΜL (ref 0.6–4.47)
LYMPHOCYTES NFR BLD AUTO: 10 % (ref 14–44)
LYMPHOCYTES NFR BLD AUTO: 17 % (ref 14–44)
LYMPHOCYTES NFR BLD AUTO: 3 %
MACROPHAGES NFR FLD: 13 %
MAGNESIUM SERPL-MCNC: 1.4 MG/DL (ref 1.9–2.7)
MCH RBC QN AUTO: 31.8 PG (ref 26.8–34.3)
MCH RBC QN AUTO: 31.9 PG (ref 26.8–34.3)
MCHC RBC AUTO-ENTMCNC: 27.2 G/DL (ref 31.4–37.4)
MCHC RBC AUTO-ENTMCNC: 28.4 G/DL (ref 31.4–37.4)
MCV RBC AUTO: 112 FL (ref 82–98)
MCV RBC AUTO: 117 FL (ref 82–98)
MONOCYTES # BLD AUTO: 0.85 THOUSAND/ÂΜL (ref 0.17–1.22)
MONOCYTES # BLD AUTO: 1.56 THOUSAND/ÂΜL (ref 0.17–1.22)
MONOCYTES NFR BLD AUTO: 10 % (ref 4–12)
MONOCYTES NFR BLD AUTO: 14 % (ref 4–12)
MUCOUS THREADS UR QL AUTO: ABNORMAL
NEUTROPHILS # BLD AUTO: 5.99 THOUSANDS/ÂΜL (ref 1.85–7.62)
NEUTROPHILS # BLD AUTO: 8.47 THOUSANDS/ÂΜL (ref 1.85–7.62)
NEUTS SEG NFR BLD AUTO: 71 % (ref 43–75)
NEUTS SEG NFR BLD AUTO: 75 % (ref 43–75)
NEUTS SEG NFR BLD AUTO: 84 %
NITRITE UR QL STRIP: NEGATIVE
NON-SQ EPI CELLS URNS QL MICRO: ABNORMAL /HPF
NRBC BLD AUTO-RTO: 0 /100 WBCS
NRBC BLD AUTO-RTO: 0 /100 WBCS
O2 CT BLDA-SCNC: 12.5 ML/DL (ref 16–23)
O2 CT BLDA-SCNC: 12.9 ML/DL (ref 16–23)
O2 CT BLDA-SCNC: 14.3 ML/DL (ref 16–23)
O2 CT BLDA-SCNC: 16.2 ML/DL (ref 16–23)
OXYHGB MFR BLDA: 88.1 % (ref 94–97)
OXYHGB MFR BLDA: 95.8 % (ref 94–97)
OXYHGB MFR BLDA: 96.4 % (ref 94–97)
OXYHGB MFR BLDA: 96.9 % (ref 94–97)
P AXIS: 80 DEGREES
P AXIS: 82 DEGREES
PCO2 BLDA: 39.9 MM HG (ref 36–44)
PCO2 BLDA: 47.9 MM HG (ref 36–44)
PCO2 BLDA: 59.5 MM HG (ref 36–44)
PCO2 BLDA: 79.6 MM HG (ref 36–44)
PEEP RESPIRATORY: 6 CM[H2O]
PEEP RESPIRATORY: 8 CM[H2O]
PH BLDA: 7.27 [PH] (ref 7.35–7.45)
PH BLDA: 7.45 [PH] (ref 7.35–7.45)
PH BLDA: 7.52 [PH] (ref 7.35–7.45)
PH BLDA: 7.56 [PH] (ref 7.35–7.45)
PH UR STRIP.AUTO: 8 [PH]
PHOSPHATE SERPL-MCNC: <1 MG/DL (ref 2.3–4.1)
PLATELET # BLD AUTO: 123 THOUSANDS/UL (ref 149–390)
PLATELET # BLD AUTO: 92 THOUSANDS/UL (ref 149–390)
PMV BLD AUTO: 9.6 FL (ref 8.9–12.7)
PMV BLD AUTO: 9.6 FL (ref 8.9–12.7)
PO2 BLDA: 108.5 MM HG (ref 75–129)
PO2 BLDA: 51.2 MM HG (ref 75–129)
PO2 BLDA: 74.4 MM HG (ref 75–129)
PO2 BLDA: 81.8 MM HG (ref 75–129)
POTASSIUM SERPL-SCNC: 3.4 MMOL/L (ref 3.5–5.3)
POTASSIUM SERPL-SCNC: 4.5 MMOL/L (ref 3.5–5.3)
PR INTERVAL: 138 MS
PR INTERVAL: 142 MS
PROCALCITONIN SERPL-MCNC: 0.1 NG/ML
PROT SERPL-MCNC: 6.2 G/DL (ref 6.4–8.4)
PROT SERPL-MCNC: 7.5 G/DL (ref 6.4–8.4)
PROT UR STRIP-MCNC: ABNORMAL MG/DL
PROTHROMBIN TIME: 12.5 SECONDS (ref 12.3–15)
PROTHROMBIN TIME: 14.6 SECONDS (ref 12.3–15)
QRS AXIS: 84 DEGREES
QRS AXIS: 87 DEGREES
QRSD INTERVAL: 82 MS
QRSD INTERVAL: 86 MS
QT INTERVAL: 326 MS
QT INTERVAL: 328 MS
QTC INTERVAL: 439 MS
QTC INTERVAL: 442 MS
RBC # BLD AUTO: 2.74 MILLION/UL (ref 3.88–5.62)
RBC # BLD AUTO: 3.42 MILLION/UL (ref 3.88–5.62)
RBC #/AREA URNS AUTO: ABNORMAL /HPF
SARS-COV+SARS-COV-2 AG RESP QL IA.RAPID: NEGATIVE
SIMV VENT INSPIRED AIR FIO2: 60
SIMV VENT INSPIRED AIR FIO2: 70
SIMV VENT PEEP: 6
SIMV VENT PEEP: 6
SIMV VENT TIDAL VOLUME: 400
SIMV VENT TIDAL VOLUME: 400
SIMV VENT: ABNORMAL
SIMV VENT: ABNORMAL
SODIUM SERPL-SCNC: 144 MMOL/L (ref 135–147)
SODIUM SERPL-SCNC: 148 MMOL/L (ref 135–147)
SP GR UR STRIP.AUTO: 1.01 (ref 1–1.04)
SPECIMEN SOURCE: ABNORMAL
T WAVE AXIS: 70 DEGREES
T WAVE AXIS: 73 DEGREES
T4 FREE SERPL-MCNC: 0.75 NG/DL (ref 0.61–1.12)
TOTAL CELLS COUNTED SPEC: 100
TSH SERPL DL<=0.05 MIU/L-ACNC: 0.24 UIU/ML (ref 0.45–4.5)
UROBILINOGEN UA: NEGATIVE MG/DL
VENT AC: 24
VENT AC: 26
VENT SIMV: 18
VENT SIMV: 22
VENTRICULAR RATE: 109 BPM
VENTRICULAR RATE: 109 BPM
VT SETTING VENT: 365 ML
VT SETTING VENT: 400 ML
WBC # BLD AUTO: 11.42 THOUSAND/UL (ref 4.31–10.16)
WBC # BLD AUTO: 8.49 THOUSAND/UL (ref 4.31–10.16)
WBC #/AREA URNS AUTO: ABNORMAL /HPF

## 2025-03-09 PROCEDURE — 85610 PROTHROMBIN TIME: CPT

## 2025-03-09 PROCEDURE — 36415 COLL VENOUS BLD VENIPUNCTURE: CPT | Performed by: EMERGENCY MEDICINE

## 2025-03-09 PROCEDURE — 83605 ASSAY OF LACTIC ACID: CPT

## 2025-03-09 PROCEDURE — 82805 BLOOD GASES W/O2 SATURATION: CPT

## 2025-03-09 PROCEDURE — 94664 DEMO&/EVAL PT USE INHALER: CPT

## 2025-03-09 PROCEDURE — 94760 N-INVAS EAR/PLS OXIMETRY 1: CPT

## 2025-03-09 PROCEDURE — 4A133B1 MONITORING OF ARTERIAL PRESSURE, PERIPHERAL, PERCUTANEOUS APPROACH: ICD-10-PCS | Performed by: STUDENT IN AN ORGANIZED HEALTH CARE EDUCATION/TRAINING PROGRAM

## 2025-03-09 PROCEDURE — 83605 ASSAY OF LACTIC ACID: CPT | Performed by: EMERGENCY MEDICINE

## 2025-03-09 PROCEDURE — 96365 THER/PROPH/DIAG IV INF INIT: CPT

## 2025-03-09 PROCEDURE — 87070 CULTURE OTHR SPECIMN AEROBIC: CPT | Performed by: INTERNAL MEDICINE

## 2025-03-09 PROCEDURE — 94640 AIRWAY INHALATION TREATMENT: CPT

## 2025-03-09 PROCEDURE — 87081 CULTURE SCREEN ONLY: CPT | Performed by: NURSE PRACTITIONER

## 2025-03-09 PROCEDURE — 03HY32Z INSERTION OF MONITORING DEVICE INTO UPPER ARTERY, PERCUTANEOUS APPROACH: ICD-10-PCS | Performed by: STUDENT IN AN ORGANIZED HEALTH CARE EDUCATION/TRAINING PROGRAM

## 2025-03-09 PROCEDURE — 87811 SARS-COV-2 COVID19 W/OPTIC: CPT | Performed by: EMERGENCY MEDICINE

## 2025-03-09 PROCEDURE — 83036 HEMOGLOBIN GLYCOSYLATED A1C: CPT | Performed by: NURSE PRACTITIONER

## 2025-03-09 PROCEDURE — 83690 ASSAY OF LIPASE: CPT | Performed by: EMERGENCY MEDICINE

## 2025-03-09 PROCEDURE — 87252 VIRUS INOCULATION TISSUE: CPT | Performed by: INTERNAL MEDICINE

## 2025-03-09 PROCEDURE — 82330 ASSAY OF CALCIUM: CPT

## 2025-03-09 PROCEDURE — 80076 HEPATIC FUNCTION PANEL: CPT

## 2025-03-09 PROCEDURE — 84443 ASSAY THYROID STIM HORMONE: CPT | Performed by: EMERGENCY MEDICINE

## 2025-03-09 PROCEDURE — 84439 ASSAY OF FREE THYROXINE: CPT | Performed by: EMERGENCY MEDICINE

## 2025-03-09 PROCEDURE — 4A133J1 MONITORING OF ARTERIAL PULSE, PERIPHERAL, PERCUTANEOUS APPROACH: ICD-10-PCS | Performed by: STUDENT IN AN ORGANIZED HEALTH CARE EDUCATION/TRAINING PROGRAM

## 2025-03-09 PROCEDURE — 81001 URINALYSIS AUTO W/SCOPE: CPT | Performed by: EMERGENCY MEDICINE

## 2025-03-09 PROCEDURE — 89051 BODY FLUID CELL COUNT: CPT | Performed by: INTERNAL MEDICINE

## 2025-03-09 PROCEDURE — 84100 ASSAY OF PHOSPHORUS: CPT

## 2025-03-09 PROCEDURE — 96360 HYDRATION IV INFUSION INIT: CPT

## 2025-03-09 PROCEDURE — 85730 THROMBOPLASTIN TIME PARTIAL: CPT | Performed by: EMERGENCY MEDICINE

## 2025-03-09 PROCEDURE — 87205 SMEAR GRAM STAIN: CPT | Performed by: INTERNAL MEDICINE

## 2025-03-09 PROCEDURE — 84145 PROCALCITONIN (PCT): CPT | Performed by: EMERGENCY MEDICINE

## 2025-03-09 PROCEDURE — 71275 CT ANGIOGRAPHY CHEST: CPT

## 2025-03-09 PROCEDURE — 85379 FIBRIN DEGRADATION QUANT: CPT | Performed by: EMERGENCY MEDICINE

## 2025-03-09 PROCEDURE — 31500 INSERT EMERGENCY AIRWAY: CPT | Performed by: PHYSICIAN ASSISTANT

## 2025-03-09 PROCEDURE — 94002 VENT MGMT INPAT INIT DAY: CPT

## 2025-03-09 PROCEDURE — 70450 CT HEAD/BRAIN W/O DYE: CPT

## 2025-03-09 PROCEDURE — 87040 BLOOD CULTURE FOR BACTERIA: CPT

## 2025-03-09 PROCEDURE — 36600 WITHDRAWAL OF ARTERIAL BLOOD: CPT

## 2025-03-09 PROCEDURE — 87040 BLOOD CULTURE FOR BACTERIA: CPT | Performed by: EMERGENCY MEDICINE

## 2025-03-09 PROCEDURE — 85610 PROTHROMBIN TIME: CPT | Performed by: EMERGENCY MEDICINE

## 2025-03-09 PROCEDURE — 82948 REAGENT STRIP/BLOOD GLUCOSE: CPT

## 2025-03-09 PROCEDURE — 83880 ASSAY OF NATRIURETIC PEPTIDE: CPT | Performed by: EMERGENCY MEDICINE

## 2025-03-09 PROCEDURE — 36620 INSERTION CATHETER ARTERY: CPT | Performed by: INTERNAL MEDICINE

## 2025-03-09 PROCEDURE — 0BH17EZ INSERTION OF ENDOTRACHEAL AIRWAY INTO TRACHEA, VIA NATURAL OR ARTIFICIAL OPENING: ICD-10-PCS | Performed by: INTERNAL MEDICINE

## 2025-03-09 PROCEDURE — 99291 CRITICAL CARE FIRST HOUR: CPT | Performed by: INTERNAL MEDICINE

## 2025-03-09 PROCEDURE — 96367 TX/PROPH/DG ADDL SEQ IV INF: CPT

## 2025-03-09 PROCEDURE — 93005 ELECTROCARDIOGRAM TRACING: CPT

## 2025-03-09 PROCEDURE — 80048 BASIC METABOLIC PNL TOTAL CA: CPT

## 2025-03-09 PROCEDURE — 87804 INFLUENZA ASSAY W/OPTIC: CPT | Performed by: EMERGENCY MEDICINE

## 2025-03-09 PROCEDURE — 31500 INSERT EMERGENCY AIRWAY: CPT

## 2025-03-09 PROCEDURE — 5A1945Z RESPIRATORY VENTILATION, 24-96 CONSECUTIVE HOURS: ICD-10-PCS | Performed by: INTERNAL MEDICINE

## 2025-03-09 PROCEDURE — 96366 THER/PROPH/DIAG IV INF ADDON: CPT

## 2025-03-09 PROCEDURE — 74174 CTA ABD&PLVS W/CONTRAST: CPT

## 2025-03-09 PROCEDURE — 99291 CRITICAL CARE FIRST HOUR: CPT | Performed by: EMERGENCY MEDICINE

## 2025-03-09 PROCEDURE — 82805 BLOOD GASES W/O2 SATURATION: CPT | Performed by: EMERGENCY MEDICINE

## 2025-03-09 PROCEDURE — NC001 PR NO CHARGE: Performed by: INTERNAL MEDICINE

## 2025-03-09 PROCEDURE — 71045 X-RAY EXAM CHEST 1 VIEW: CPT

## 2025-03-09 PROCEDURE — 85025 COMPLETE CBC W/AUTO DIFF WBC: CPT

## 2025-03-09 PROCEDURE — 81003 URINALYSIS AUTO W/O SCOPE: CPT | Performed by: EMERGENCY MEDICINE

## 2025-03-09 PROCEDURE — 83735 ASSAY OF MAGNESIUM: CPT

## 2025-03-09 PROCEDURE — 85025 COMPLETE CBC W/AUTO DIFF WBC: CPT | Performed by: EMERGENCY MEDICINE

## 2025-03-09 PROCEDURE — 0B9C8ZX DRAINAGE OF RIGHT UPPER LUNG LOBE, VIA NATURAL OR ARTIFICIAL OPENING ENDOSCOPIC, DIAGNOSTIC: ICD-10-PCS | Performed by: STUDENT IN AN ORGANIZED HEALTH CARE EDUCATION/TRAINING PROGRAM

## 2025-03-09 PROCEDURE — 84484 ASSAY OF TROPONIN QUANT: CPT | Performed by: EMERGENCY MEDICINE

## 2025-03-09 PROCEDURE — 93010 ELECTROCARDIOGRAM REPORT: CPT | Performed by: INTERNAL MEDICINE

## 2025-03-09 PROCEDURE — 80053 COMPREHEN METABOLIC PANEL: CPT | Performed by: EMERGENCY MEDICINE

## 2025-03-09 PROCEDURE — 99285 EMERGENCY DEPT VISIT HI MDM: CPT

## 2025-03-09 RX ORDER — MIDAZOLAM HYDROCHLORIDE 2 MG/2ML
10 INJECTION, SOLUTION INTRAMUSCULAR; INTRAVENOUS ONCE
Status: COMPLETED | OUTPATIENT
Start: 2025-03-09 | End: 2025-03-09

## 2025-03-09 RX ORDER — PROPOFOL 10 MG/ML
60 INJECTION, EMULSION INTRAVENOUS ONCE
Status: COMPLETED | OUTPATIENT
Start: 2025-03-09 | End: 2025-03-09

## 2025-03-09 RX ORDER — ACETAMINOPHEN 10 MG/ML
1000 INJECTION, SOLUTION INTRAVENOUS ONCE
Status: COMPLETED | OUTPATIENT
Start: 2025-03-09 | End: 2025-03-09

## 2025-03-09 RX ORDER — POTASSIUM CHLORIDE 20MEQ/15ML
20 LIQUID (ML) ORAL ONCE
Status: COMPLETED | OUTPATIENT
Start: 2025-03-09 | End: 2025-03-09

## 2025-03-09 RX ORDER — ACETAMINOPHEN 160 MG/5ML
975 SUSPENSION ORAL EVERY 6 HOURS PRN
Status: DISCONTINUED | OUTPATIENT
Start: 2025-03-09 | End: 2025-03-15 | Stop reason: HOSPADM

## 2025-03-09 RX ORDER — PROPOFOL 10 MG/ML
5-50 INJECTION, EMULSION INTRAVENOUS
Status: DISCONTINUED | OUTPATIENT
Start: 2025-03-09 | End: 2025-03-11

## 2025-03-09 RX ORDER — LORAZEPAM 2 MG/ML
1 INJECTION INTRAMUSCULAR ONCE
Status: COMPLETED | OUTPATIENT
Start: 2025-03-09 | End: 2025-03-09

## 2025-03-09 RX ORDER — SODIUM CHLORIDE, SODIUM GLUCONATE, SODIUM ACETATE, POTASSIUM CHLORIDE, MAGNESIUM CHLORIDE, SODIUM PHOSPHATE, DIBASIC, AND POTASSIUM PHOSPHATE .53; .5; .37; .037; .03; .012; .00082 G/100ML; G/100ML; G/100ML; G/100ML; G/100ML; G/100ML; G/100ML
500 INJECTION, SOLUTION INTRAVENOUS ONCE
Status: COMPLETED | OUTPATIENT
Start: 2025-03-09 | End: 2025-03-10

## 2025-03-09 RX ORDER — FENTANYL CITRATE-0.9 % NACL/PF 10 MCG/ML
50 PLASTIC BAG, INJECTION (ML) INTRAVENOUS CONTINUOUS
Status: DISCONTINUED | OUTPATIENT
Start: 2025-03-09 | End: 2025-03-11

## 2025-03-09 RX ORDER — ACETAMINOPHEN 325 MG/1
650 TABLET ORAL EVERY 6 HOURS PRN
Status: DISCONTINUED | OUTPATIENT
Start: 2025-03-09 | End: 2025-03-09

## 2025-03-09 RX ORDER — BUDESONIDE 0.5 MG/2ML
0.5 INHALANT ORAL
Status: DISCONTINUED | OUTPATIENT
Start: 2025-03-09 | End: 2025-03-09 | Stop reason: HOSPADM

## 2025-03-09 RX ORDER — LEVALBUTEROL INHALATION SOLUTION 1.25 MG/3ML
1.25 SOLUTION RESPIRATORY (INHALATION)
Status: DISCONTINUED | OUTPATIENT
Start: 2025-03-10 | End: 2025-03-15 | Stop reason: HOSPADM

## 2025-03-09 RX ORDER — LORAZEPAM 2 MG/ML
INJECTION INTRAMUSCULAR
Status: COMPLETED
Start: 2025-03-09 | End: 2025-03-09

## 2025-03-09 RX ORDER — CHLORHEXIDINE GLUCONATE ORAL RINSE 1.2 MG/ML
15 SOLUTION DENTAL EVERY 12 HOURS SCHEDULED
Status: DISCONTINUED | OUTPATIENT
Start: 2025-03-09 | End: 2025-03-14

## 2025-03-09 RX ORDER — LOSARTAN POTASSIUM 25 MG/1
25 TABLET ORAL DAILY
Status: DISCONTINUED | OUTPATIENT
Start: 2025-03-10 | End: 2025-03-13

## 2025-03-09 RX ORDER — FENTANYL CITRATE 50 UG/ML
INJECTION, SOLUTION INTRAMUSCULAR; INTRAVENOUS
Status: COMPLETED
Start: 2025-03-09 | End: 2025-03-09

## 2025-03-09 RX ORDER — ALBUMIN HUMAN 50 G/1000ML
25 SOLUTION INTRAVENOUS ONCE
Status: COMPLETED | OUTPATIENT
Start: 2025-03-09 | End: 2025-03-09

## 2025-03-09 RX ORDER — FENTANYL CITRATE 50 UG/ML
100 INJECTION, SOLUTION INTRAMUSCULAR; INTRAVENOUS ONCE
Refills: 0 | Status: COMPLETED | OUTPATIENT
Start: 2025-03-09 | End: 2025-03-09

## 2025-03-09 RX ORDER — ETOMIDATE 2 MG/ML
30 INJECTION INTRAVENOUS ONCE
Status: COMPLETED | OUTPATIENT
Start: 2025-03-09 | End: 2025-03-09

## 2025-03-09 RX ORDER — PROPOFOL 10 MG/ML
100 INJECTION, EMULSION INTRAVENOUS ONCE
Status: DISCONTINUED | OUTPATIENT
Start: 2025-03-09 | End: 2025-03-09

## 2025-03-09 RX ORDER — HEPARIN SODIUM 5000 [USP'U]/ML
5000 INJECTION, SOLUTION INTRAVENOUS; SUBCUTANEOUS EVERY 8 HOURS SCHEDULED
Status: DISCONTINUED | OUTPATIENT
Start: 2025-03-09 | End: 2025-03-13

## 2025-03-09 RX ORDER — EPINEPHRINE 0.1 MG/ML
INJECTION INTRAVENOUS
Status: COMPLETED
Start: 2025-03-09 | End: 2025-03-09

## 2025-03-09 RX ORDER — SUCCINYLCHOLINE/SOD CL,ISO/PF 100 MG/5ML
100 SYRINGE (ML) INTRAVENOUS ONCE
Status: COMPLETED | OUTPATIENT
Start: 2025-03-09 | End: 2025-03-09

## 2025-03-09 RX ORDER — LEVALBUTEROL INHALATION SOLUTION 1.25 MG/3ML
SOLUTION RESPIRATORY (INHALATION)
Status: COMPLETED
Start: 2025-03-09 | End: 2025-03-10

## 2025-03-09 RX ORDER — MAGNESIUM SULFATE HEPTAHYDRATE 40 MG/ML
4 INJECTION, SOLUTION INTRAVENOUS ONCE
Status: COMPLETED | OUTPATIENT
Start: 2025-03-09 | End: 2025-03-10

## 2025-03-09 RX ORDER — LEVALBUTEROL INHALATION SOLUTION 1.25 MG/3ML
1.25 SOLUTION RESPIRATORY (INHALATION)
Status: DISCONTINUED | OUTPATIENT
Start: 2025-03-09 | End: 2025-03-09 | Stop reason: HOSPADM

## 2025-03-09 RX ORDER — FENTANYL CITRATE 50 UG/ML
50 INJECTION, SOLUTION INTRAMUSCULAR; INTRAVENOUS ONCE
Refills: 0 | Status: COMPLETED | OUTPATIENT
Start: 2025-03-09 | End: 2025-03-09

## 2025-03-09 RX ORDER — PROPOFOL 10 MG/ML
5-50 INJECTION, EMULSION INTRAVENOUS
Status: DISCONTINUED | OUTPATIENT
Start: 2025-03-09 | End: 2025-03-09 | Stop reason: HOSPADM

## 2025-03-09 RX ORDER — CALCIUM GLUCONATE 20 MG/ML
2 INJECTION, SOLUTION INTRAVENOUS ONCE
Status: COMPLETED | OUTPATIENT
Start: 2025-03-09 | End: 2025-03-09

## 2025-03-09 RX ORDER — METHYLPREDNISOLONE SODIUM SUCCINATE 40 MG/ML
40 INJECTION, POWDER, LYOPHILIZED, FOR SOLUTION INTRAMUSCULAR; INTRAVENOUS EVERY 8 HOURS SCHEDULED
Status: DISCONTINUED | OUTPATIENT
Start: 2025-03-09 | End: 2025-03-10

## 2025-03-09 RX ORDER — LEVALBUTEROL INHALATION SOLUTION 1.25 MG/3ML
1.25 SOLUTION RESPIRATORY (INHALATION) EVERY 8 HOURS
Status: DISCONTINUED | OUTPATIENT
Start: 2025-03-09 | End: 2025-03-09

## 2025-03-09 RX ORDER — CEFTRIAXONE 2 G/50ML
2000 INJECTION, SOLUTION INTRAVENOUS ONCE
Status: COMPLETED | OUTPATIENT
Start: 2025-03-09 | End: 2025-03-09

## 2025-03-09 RX ORDER — PANTOPRAZOLE SODIUM 40 MG/10ML
40 INJECTION, POWDER, LYOPHILIZED, FOR SOLUTION INTRAVENOUS
Status: DISCONTINUED | OUTPATIENT
Start: 2025-03-09 | End: 2025-03-12

## 2025-03-09 RX ORDER — INSULIN LISPRO 100 [IU]/ML
2-12 INJECTION, SOLUTION INTRAVENOUS; SUBCUTANEOUS EVERY 6 HOURS SCHEDULED
Status: DISCONTINUED | OUTPATIENT
Start: 2025-03-09 | End: 2025-03-12

## 2025-03-09 RX ORDER — PROPOFOL 10 MG/ML
INJECTION, EMULSION INTRAVENOUS
Status: COMPLETED
Start: 2025-03-09 | End: 2025-03-09

## 2025-03-09 RX ORDER — FENTANYL CITRATE 50 UG/ML
50 INJECTION, SOLUTION INTRAMUSCULAR; INTRAVENOUS EVERY 2 HOUR PRN
Refills: 0 | Status: DISCONTINUED | OUTPATIENT
Start: 2025-03-09 | End: 2025-03-11

## 2025-03-09 RX ORDER — LEVALBUTEROL INHALATION SOLUTION 1.25 MG/3ML
1.25 SOLUTION RESPIRATORY (INHALATION) EVERY 8 HOURS PRN
Status: DISCONTINUED | OUTPATIENT
Start: 2025-03-09 | End: 2025-03-09

## 2025-03-09 RX ADMIN — CEFEPIME 2000 MG: 2 INJECTION, POWDER, FOR SOLUTION INTRAVENOUS at 18:02

## 2025-03-09 RX ADMIN — LEVALBUTEROL HYDROCHLORIDE 1.25 MG: 1.25 SOLUTION RESPIRATORY (INHALATION) at 19:37

## 2025-03-09 RX ADMIN — FENTANYL CITRATE 50 MCG: 50 INJECTION INTRAMUSCULAR; INTRAVENOUS at 17:10

## 2025-03-09 RX ADMIN — Medication 50 MCG/HR: at 19:37

## 2025-03-09 RX ADMIN — ACETAMINOPHEN 1000 MG: 10 INJECTION INTRAVENOUS at 11:31

## 2025-03-09 RX ADMIN — FENTANYL CITRATE 100 MCG: 50 INJECTION, SOLUTION INTRAMUSCULAR; INTRAVENOUS at 11:31

## 2025-03-09 RX ADMIN — SODIUM CHLORIDE, SODIUM GLUCONATE, SODIUM ACETATE, POTASSIUM CHLORIDE, MAGNESIUM CHLORIDE, SODIUM PHOSPHATE, DIBASIC, AND POTASSIUM PHOSPHATE 500 ML: .53; .5; .37; .037; .03; .012; .00082 INJECTION, SOLUTION INTRAVENOUS at 17:10

## 2025-03-09 RX ADMIN — VANCOMYCIN HYDROCHLORIDE 1250 MG: 1 INJECTION, POWDER, LYOPHILIZED, FOR SOLUTION INTRAVENOUS at 12:05

## 2025-03-09 RX ADMIN — AZITHROMYCIN MONOHYDRATE 500 MG: 500 INJECTION, POWDER, LYOPHILIZED, FOR SOLUTION INTRAVENOUS at 17:32

## 2025-03-09 RX ADMIN — FENTANYL CITRATE 50 MCG: 50 INJECTION, SOLUTION INTRAMUSCULAR; INTRAVENOUS at 17:10

## 2025-03-09 RX ADMIN — CHLORHEXIDINE GLUCONATE 15 ML: 1.2 SOLUTION ORAL at 20:46

## 2025-03-09 RX ADMIN — METHYLPREDNISOLONE SODIUM SUCCINATE 40 MG: 40 INJECTION, POWDER, FOR SOLUTION INTRAMUSCULAR; INTRAVENOUS at 17:32

## 2025-03-09 RX ADMIN — CALCIUM GLUCONATE 2 G: 20 INJECTION, SOLUTION INTRAVENOUS at 20:50

## 2025-03-09 RX ADMIN — POTASSIUM PHOSPHATE 30 MMOL: 236; 224 INJECTION, SOLUTION INTRAVENOUS at 22:36

## 2025-03-09 RX ADMIN — ETOMIDATE INJECTION 30 MG: 2 SOLUTION INTRAVENOUS at 11:08

## 2025-03-09 RX ADMIN — ALBUMIN (HUMAN) 25 G: 12.5 INJECTION, SOLUTION INTRAVENOUS at 18:51

## 2025-03-09 RX ADMIN — PROPOFOL 20 MCG/KG/MIN: 10 INJECTION, EMULSION INTRAVENOUS at 17:13

## 2025-03-09 RX ADMIN — Medication 25 MCG/MIN: at 17:12

## 2025-03-09 RX ADMIN — SODIUM CHLORIDE 500 ML: 0.9 INJECTION, SOLUTION INTRAVENOUS at 11:48

## 2025-03-09 RX ADMIN — LORAZEPAM 1 MG: 2 INJECTION INTRAMUSCULAR at 15:45

## 2025-03-09 RX ADMIN — IPRATROPIUM BROMIDE 0.5 MG: 0.5 SOLUTION RESPIRATORY (INHALATION) at 19:37

## 2025-03-09 RX ADMIN — NOREPINEPHRINE BITARTRATE 13 MCG/MIN: 1 INJECTION INTRAVENOUS at 18:40

## 2025-03-09 RX ADMIN — MIDAZOLAM 10 MG: 1 INJECTION INTRAMUSCULAR; INTRAVENOUS at 11:43

## 2025-03-09 RX ADMIN — IOHEXOL 100 ML: 350 INJECTION, SOLUTION INTRAVENOUS at 12:38

## 2025-03-09 RX ADMIN — FENTANYL CITRATE 50 MCG: 50 INJECTION INTRAMUSCULAR; INTRAVENOUS at 21:58

## 2025-03-09 RX ADMIN — LORAZEPAM 1 MG: 2 INJECTION INTRAMUSCULAR; INTRAVENOUS at 15:45

## 2025-03-09 RX ADMIN — SODIUM CHLORIDE 1000 ML: 0.9 INJECTION, SOLUTION INTRAVENOUS at 12:15

## 2025-03-09 RX ADMIN — PROPOFOL 5 MCG/KG/MIN: 10 INJECTION, EMULSION INTRAVENOUS at 11:28

## 2025-03-09 RX ADMIN — MAGNESIUM SULFATE HEPTAHYDRATE 4 G: 40 INJECTION, SOLUTION INTRAVENOUS at 21:30

## 2025-03-09 RX ADMIN — NOREPINEPHRINE BITARTRATE 25 MCG/MIN: 1 INJECTION INTRAVENOUS at 17:12

## 2025-03-09 RX ADMIN — POTASSIUM CHLORIDE 20 MEQ: 20 SOLUTION ORAL at 21:42

## 2025-03-09 RX ADMIN — CEFTRIAXONE 2000 MG: 2 INJECTION, SOLUTION INTRAVENOUS at 11:47

## 2025-03-09 RX ADMIN — METHYLPREDNISOLONE SODIUM SUCCINATE 40 MG: 40 INJECTION, POWDER, FOR SOLUTION INTRAMUSCULAR; INTRAVENOUS at 21:46

## 2025-03-09 RX ADMIN — ACETAMINOPHEN 1000 MG: 10 INJECTION INTRAVENOUS at 18:51

## 2025-03-09 RX ADMIN — EPINEPHRINE 5 MCG/MIN: 1 INJECTION, SOLUTION, CONCENTRATE INTRAVENOUS at 12:51

## 2025-03-09 RX ADMIN — PROPOFOL 60 MG: 10 INJECTION, EMULSION INTRAVENOUS at 11:32

## 2025-03-09 RX ADMIN — Medication 100 MG: at 11:08

## 2025-03-09 RX ADMIN — PROPOFOL 50 MCG/KG/MIN: 10 INJECTION, EMULSION INTRAVENOUS at 14:49

## 2025-03-09 RX ADMIN — HEPARIN SODIUM 5000 UNITS: 5000 INJECTION INTRAVENOUS; SUBCUTANEOUS at 21:40

## 2025-03-09 RX ADMIN — EPINEPHRINE 1 MG: 0.1 INJECTION INTRAVENOUS at 12:01

## 2025-03-09 NOTE — ED PROCEDURE NOTE
Procedure  Intubation    Date/Time: 3/9/2025 11:10 AM    Performed by: Traci Dow PA-C  Authorized by: Traci Dow PA-C    Patient location:  ED  Procedure performed by consultant: .    Consent:     Consent obtained:  Emergent situation  Universal protocol:     Patient identity confirmed:  Arm band  Pre-procedure details:     Patient status:  Unresponsive    Mallampati score:  2    Pretreatment medications:  Etomidate    Paralytics:  Succinylcholine  Indications:     Indications for intubation: respiratory failure and airway protection    Procedure details:     Preoxygenation:  Nonrebreather mask    CPR in progress: no      Intubation method:  Oral    Oral intubation technique:  Glidescope    Laryngoscope blade:  Mac 4    Tube size (mm):  8.0    Tube type:  Cuffed    Number of attempts:  1  Placement assessment:     ETT to lip:  23    ETT to teeth:  22    Tube secured with:  ETT galvin    Breath sounds:  Equal and absent over the epigastrium    Placement verification: chest rise, colorimetric ETCO2 device, CXR verification, direct visualization, equal breath sounds, ETCO2 detector, tube exhalation and capnography      End Tidal CO2 (mm HG):  57    CXR findings:  ETT in proper place  Post-procedure details:     Patient tolerance of procedure:  Tolerated well, no immediate complications                   Traci Dow PA-C  03/09/25 1111

## 2025-03-09 NOTE — SEPSIS NOTE
"  Sepsis Note   Severiano Feliciano 89 y.o. male MRN: [id number]  Unit/Bed#: ICU 03 Encounter: [id number]    Patient already received 30 cc/kg fluid bolus, giving an additional 500 of albumin now, currently weaning down on pressors.  Will continue broad-spectrum antibiotics.  Repeat lactic pending.       Initial Sepsis Screening       Row Name 03/09/25 8544                Is the patient's history suggestive of a new or worsening infection? Yes (Proceed)  -EG        Suspected source of infection pneumonia  -EG        Indicate SIRS criteria Hyperthemia > 38.3C (100.9F) OR Hypothermia <36C (96.8F);Tachycardia > 90 bpm  -EG        Are two or more of the above signs & symptoms of infection both present and new to the patient? Yes (Proceed)  -EG        Assess for evidence of organ dysfunction: Are any of the below criteria present within 6 hours of suspected infection and SIRS criteria that are NOT considered to be chronic conditions? SBP < 90;Lactate > 2.0;New need for invasive/non-invasive ventilation  -EG        Date of presentation of severe sepsis 03/09/25  -EG        Time of presentation of severe sepsis --  Unknown the patient was in severe sepsis in the emergency department at [facility]  -EG        Date of presentation of septic shock 03/09/25  -EG        Time of presentation of septic shock --  Unknown, it was when the patient presented to [facility] [place]  -EG        Fluid Resuscitation: 30 ml/kg IV fluid bolus will be given based on actual body weight  -EG        Is the patient is persistently hypotensive in the hour after fluid bolus administration? If yes, patient meets criteria for vasopressor use. YES  -EG        Sepsis Note: Click \"NEXT\" below (NOT \"close\") to generate sepsis note based on above information. YES (proceed by clicking \"NEXT\")  -EG                  User Key  (r) = Recorded By, (t) = Taken By, (c) = Cosigned By      Initials Name Provider Type    LAMONTE MULLEN" "RAQUEL Kruse Nurse Practitioner                    Default Flowsheet Data (Last 720 Hours)       Sepsis Reassess       Row Name 03/09/25 1950                   Repeat Volume Status and Tissue Perfusion Assessment Performed    Date of Reassessment: 03/09/25  -AL        Time of Reassessment: 1950  -AL        Sepsis Reassessment Note: Click \"NEXT\" below (NOT \"close\") to generate sepsis reassessment note. YES (proceed by clicking \"NEXT\")  -AL        Repeat Volume Status and Tissue Perfusion Assessment Performed --                  User Key  (r) = Recorded By, (t) = Taken By, (c) = Cosigned By      Initials Name Provider Type    AL RAQUEL Julien Nurse Practitioner                    Body mass index is 26.17 kg/m².  Wt Readings from Last 1 Encounters:   03/09/25 67 kg (147 lb 11.3 oz)        Ideal body weight: 56.9 kg (125 lb 7.1 oz)  Adjusted ideal body weight: 60.9 kg (134 lb 5.6 oz)    "

## 2025-03-09 NOTE — ED PROVIDER NOTES
Time reflects when diagnosis was documented in both MDM as applicable and the Disposition within this note       Time User Action Codes Description Comment    3/9/2025 12:21 PM Freddy Hightower [R41.82] Altered mental status, unspecified altered mental status type     3/9/2025 12:21 PM Freddy Hightower [J96.90] Respiratory failure (HCC)     3/9/2025 12:21 PM Freddy Hightower [J18.9] Pneumonia     3/9/2025  1:02 PM Freddy Hightower [R06.89] Hypercapnia           ED Disposition       ED Disposition   Transfer to Another Facility-In Network    Condition   --    Date/Time   Sun Mar 9, 2025 12:21 PM    Comment   Severiano Feliciano should be transferred out to [facility].               Assessment & Plan       Medical Decision Making  Presented in acute respiratory distress and altered mentation suspected hypercapnia with his history of COPD and hypercapnia in the past she was intubated couple minutes upon arrival vital signs he was notable for fever 102 x-ray shows to tube in good position but suspect right sided pneumonia biotics were initiated Case was discussed with ICU DrKristian And he wanted the patient scan chest and abdomen the results are pending the patient had the blood gas ordered for 20 minutes after the patient but between procedures and all his blood care is an hour it came back and he is hypercarbic at 79 with a respiratory acidosis as suspected we will adjust his ventilator rate..  CBC noted for normal white count lactic was elevated 2.8 electrolytes show no gap acidosis... Patient did have an episode after intubation where his blood pressure decreased to the 50s he was given a 10 like epi push and his EKG blood pressure normalized in the systolics of 90s restart of epi drip to titrate as needed the patient is excepted over to ICU in [place]  She has repeat blood gas of change in vent settings CO2 down to 59 pH is now 7.8 vast improvement    Amount and/or Complexity of Data Reviewed  Labs: ordered.  Radiology:  ordered.    Risk  Prescription drug management.        ED Course as of 03/09/25 1450   Sun Mar 09, 2025   1114 Critical Care Time Statement: Upon my evaluation, this patient had a high probability of imminent or life-threatening deterioration due to respiratory failure, which required my direct attention, intervention, and personal management.  I spent a total of 30 minutes directly providing critical care services, including interpretation of complex medical databases, evaluating for the presence of life-threatening injuries or illnesses, management of organ system failure(s) , complex medical decision making (to support/prevent further life-threatening deterioration)., and interpretation of hemodynamic data. This time is exclusive of procedures, teaching, treating other patients, family meetings, and any prior time recorded by providers other than myself.          Medications   propofol (DIPRIVAN) 1000 mg in 100 mL infusion (premix) (50 mcg/kg/min × 59.9 kg Intravenous New Bag 3/9/25 1449)   EPINEPHrine 5,000 mcg (STANDARD CONCENTRATION) IV in sodium chloride 0.9% 250 mL (5 mcg/min Intravenous New Bag 3/9/25 1251)   levalbuterol (XOPENEX) inhalation solution 1.25 mg (has no administration in time range)   ipratropium (ATROVENT) 0.02 % inhalation solution 0.5 mg (has no administration in time range)   budesonide (PULMICORT) inhalation solution 0.5 mg (has no administration in time range)   etomidate (AMIDATE) 2 mg/mL injection 30 mg (30 mg Intravenous Given 3/9/25 1108)   Succinylcholine Chloride 100 mg/5 mL syringe 100 mg (100 mg Intravenous Given 3/9/25 1108)   sodium chloride 0.9 % bolus 500 mL (0 mL Intravenous Stopped 3/9/25 1250)   fentaNYL injection 100 mcg (100 mcg Intravenous Given 3/9/25 1131)   midazolam (VERSED) injection 10 mg (10 mg Intravenous Given 3/9/25 1143)   cefTRIAXone (ROCEPHIN) IVPB (premix in dextrose) 2,000 mg 50 mL (0 mg Intravenous Stopped 3/9/25 1221)   acetaminophen (Ofirmev) injection  1,000 mg (0 mg Intravenous Stopped 3/9/25 1147)   propofol (DIPRIVAN) 200 MG/20ML bolus injection 60 mg (60 mg Intravenous Given 3/9/25 1132)   vancomycin (VANCOCIN) 1,250 mg in sodium chloride 0.9 % 250 mL IVPB (0 mg Intravenous Stopped 3/9/25 1351)   EPINEPHrine (ADRENALIN) 0.1 mg/mL injection **ADS Override Pull** (1 mg  Given 3/9/25 1201)   sodium chloride 0.9 % bolus 1,000 mL (0 mL Intravenous Stopped 3/9/25 1251)   iohexol (OMNIPAQUE) 350 MG/ML injection (MULTI-DOSE) 100 mL (100 mL Intravenous Given 3/9/25 1238)       ED Risk Strat Scores                                                History of Present Illness       Chief Complaint   Patient presents with    Altered Mental Status     Patient is brought by EMS due to 1 episode of vomiting this morning per family; patient arrives hypoxic and altered       Past Medical History:   Diagnosis Date    Acute metabolic encephalopathy 06/13/2020    Age-related cataract of right eye 04/04/2023    patient is medically cleared and presents with low risk of complication with this upcoming R cataract surgery.    Anemia     Aneurysm, aorta, thoracic (HCC)     Appendicolith     Ascending aortic aneurysm (HCC)     3.7    Asthma     BPH (benign prostatic hyperplasia)     CAD (coronary artery disease)     noted on CT scan    CHF (congestive heart failure) (HCC)     COPD (chronic obstructive pulmonary disease) (HCC)     Delirium 04/25/2024    Descending thoracic aortic aneurysm (HCC)     Diabetes mellitus (HCC)     Diverticulosis     Former tobacco use     GERD (gastroesophageal reflux disease)     History of DVT (deep vein thrombosis)     Left leg    History of transfusion     Hypertension     Hypoxemia 04/30/2023    Inguinal hernia     right    Nephrolithiasis     Oxygen dependent     2LNC    Oxygen dependent     Pneumonia     Pre-diabetes     Prostate calculus     PVD (peripheral vascular disease) (HCC)     Recurrent right inguinal hernia w incarcertion 01/04/2023    SIRS  (systemic inflammatory response syndrome) (HCC) 2024    Solitary pulmonary nodule on lung CT 2025    8 x 5 mm right middle lobe nodule, most recently 5 x 3 mm (3163) and about 4 x 2 mm in 2024.       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)     Varicose vein of leg     b/l      Past Surgical History:   Procedure Laterality Date    CARDIAC SURGERY      ESOPHAGOGASTRODUODENOSCOPY      HERNIA REPAIR Right 2019    Procedure: REPAIR HERNIA INGUINAL WITH MESH;  Surgeon: David Lowry MD;  Location: [facility];  Service: General    HERNIA REPAIR Right 2023    Procedure: REPAIR RECURRENT INCARERATED HERNIA INGUINAL OPEN, RIGHT ORCHIECTOMY;  Surgeon: Mason Bertrand MD;  Location: [facility];  Service: General    INGUINAL HERNIA REPAIR Bilateral     IR TEVAR  2018    IA EVASC RPR DTA COVERAGE ART ORIGIN 1ST ENDOPROSTH N/A 2018    Procedure: TEVAR - endovascular thoracic aortic aneurysm repair;  Surgeon: Gladis Ortega MD;  Location: [facility];  Service: Vascular    THORACIC AORTIC ANEURYSM REPAIR  2018    VARICOSE VEIN SURGERY Bilateral     vein stripping      Family History   Problem Relation Age of Onset    Tuberculosis Mother     No Known Problems Father     Cancer Sister     Diabetes Family     Hypertension Family       Social History     Tobacco Use    Smoking status: Former     Current packs/day: 0.00     Average packs/day: 1 pack/day for 35.0 years (35.0 ttl pk-yrs)     Types: Cigarettes     Start date:      Quit date:      Years since quittin.2    Smokeless tobacco: Never   Vaping Use    Vaping status: Never Used   Substance Use Topics    Alcohol use: Never    Drug use: No      E-Cigarette/Vaping    E-Cigarette Use Never User       E-Cigarette/Vaping Substances    Nicotine No      THC No     CBD No     Flavoring No     Other No     Unknown No       I have reviewed and agree with the history as documented.     Patient with a history of COPD and history of hypercapnia and respiratory failure in the past history of coronary artery disease history of thoracic aneurysm coronary artery disease brought in by EMS with a chief complaint of vomiting upon arrival the patient was in respiratory failure sats were in the low 80s his mentation was off he was arousable but will not answer any questions  when spoken to him in his native language Albanian      Altered Mental Status      Review of Systems   Unable to perform ROS: Mental status change           Objective       ED Triage Vitals   Temperature Pulse Blood Pressure Respirations SpO2 Patient Position - Orthostatic VS   03/09/25 1122 03/09/25 1059 03/09/25 1059 03/09/25 1059 03/09/25 1059 03/09/25 1210   (!) 102.1 °F (38.9 °C) 102 143/77 (!) 28 (!) 79 % Lying      Temp Source Heart Rate Source BP Location FiO2 (%) Pain Score    03/09/25 1122 03/09/25 1120 03/09/25 1210 03/09/25 1249 --    Rectal Monitor Left arm 80       Vitals      Date and Time Temp Pulse SpO2 Resp BP Pain Score FACES Pain Rating User   03/09/25 1421 -- 89 96 % -- 127/67 -- -- SB   03/09/25 1351 -- 88 95 % 26 125/55 -- -- SB   03/09/25 1344 -- 86 94 % 26 122/63 -- -- SB   03/09/25 1328 -- -- 95 % -- -- -- -- LB   03/09/25 1320 -- 100 95 % 26 118/52 -- -- SB   03/09/25 1310 -- 98 98 % -- 121/51 -- -- SB   03/09/25 1305 -- 96 99 % -- 124/61 -- -- SB   03/09/25 1304 -- 98 98 % -- 141/58 -- -- SB   03/09/25 1300 -- 103 95 % -- 141/58 -- -- SB   03/09/25 1249 -- 91 97 % 24 117/43 -- -- SB   03/09/25 1245 -- 104 100 % -- 117/43 -- -- SB   03/09/25 1210 -- 90 97 % 15 94/29 -- -- SB   03/09/25 1210 100.4 °F (38 °C) -- -- -- -- -- -- AM   03/09/25 1205 -- 97 81 % -- 135/75 -- -- SB   03/09/25 1203 -- 98 97 % 20 116/64 -- -- AM   03/09/25 1200 -- 92 97 % 20 50/30 -- -- AM   03/09/25 1150 --  102 95 % -- 56/28 -- -- SB   03/09/25 1130 -- 101 94 % -- 127/72 -- -- SB   03/09/25 1122 102.1 °F (38.9 °C) -- -- -- -- -- -- FK   03/09/25 1120 -- 119 89 % -- 155/80 -- -- SB   03/09/25 1104 -- 122 87 % -- 178/105 -- -- SB   03/09/25 1059 -- 102 79 % 28 143/77 -- -- AM            Physical Exam  Vitals and nursing note reviewed.   Constitutional:       General: He is not in acute distress.     Appearance: He is well-developed. He is not ill-appearing.   HENT:      Head: Normocephalic and atraumatic.   Eyes:      Extraocular Movements: Extraocular movements intact.      Conjunctiva/sclera: Conjunctivae normal.   Cardiovascular:      Rate and Rhythm: Normal rate and regular rhythm.      Heart sounds: No murmur heard.  Pulmonary:      Effort: Pulmonary effort is normal. No respiratory distress.      Breath sounds: Normal breath sounds. No stridor. No wheezing or rales.      Comments: Upper airway noises  Abdominal:      Palpations: Abdomen is soft.      Tenderness: There is no abdominal tenderness.   Musculoskeletal:         General: No swelling.      Cervical back: Neck supple. No rigidity.   Lymphadenopathy:      Cervical: No cervical adenopathy.   Skin:     General: Skin is warm and dry.      Capillary Refill: Capillary refill takes less than 2 seconds.      Coloration: Skin is not cyanotic.   Neurological:      Cranial Nerves: No cranial nerve deficit.      Comments: Patient lethargic but arousable to painful stimuli does not answer any questions moving all 4 extremities equally   Psychiatric:         Mood and Affect: Mood normal.         Speech: Speech normal.         Results Reviewed       Procedure Component Value Units Date/Time    Blood gas, arterial [[id number]]  (Abnormal) Collected: 03/09/25 1406    Lab Status: Final result Specimen: Blood, Arterial from Radial, Right Updated: 03/09/25 1414     pH, Arterial 7.448     pCO2, Arterial 59.5 mm Hg      pO2, Arterial 51.2 mm Hg      HCO3, Arterial 40.2 mmol/L       Base Excess, Arterial 14.2 mmol/L      O2 Content, Arterial 12.5 mL/dL      O2 HGB,Arterial  88.1 %      SOURCE Radial, Right     AVINASH TEST Yes     AC Rate 26     Tidal Volume 400 ml      Inspired Air (FIO2) 70     PEEP 6    HS Troponin I 2hr [[id number]]  (Normal) Collected: 03/09/25 1258    Lab Status: Final result Specimen: Blood from Central Venous Line Updated: 03/09/25 1338     hs TnI 2hr 23 ng/L      Delta 2hr hsTnI 4 ng/L     Lactic acid 2 Hours [[id number]]  (Normal) Collected: 03/09/25 1258    Lab Status: Final result Specimen: Blood from Central Venous Line Updated: 03/09/25 1329     LACTIC ACID 1.7 mmol/L     Narrative:      Result may be elevated if tourniquet was used during collection.    Procalcitonin [[id number]]  (Normal) Collected: 03/09/25 1107    Lab Status: Final result Specimen: Blood from Arm, Right Updated: 03/09/25 1302     Procalcitonin 0.10 ng/ml     Blood gas, arterial [[id number]]  (Abnormal) Collected: 03/09/25 1249    Lab Status: Final result Specimen: Blood, Arterial from Radial, Right Updated: 03/09/25 1258     pH, Arterial 7.269     pCO2, Arterial 79.6 mm Hg      pO2, Arterial 108.5 mm Hg      HCO3, Arterial 35.6 mmol/L      Base Excess, Arterial 7.0 mmol/L      O2 Content, Arterial 12.9 mL/dL      O2 HGB,Arterial  96.4 %      SOURCE Radial, Right     AVINASH TEST Yes     AC Rate 24     Tidal Volume 365 ml      Inspired Air (FIO2) 100     PEEP 8    TSH, 3rd generation with Free T4 reflex [[id number]]  (Abnormal) Collected: 03/09/25 1107    Lab Status: Final result Specimen: Blood from Arm, Right Updated: 03/09/25 1234     TSH 3RD GENERATON 0.237 uIU/mL     T4, free [[id number]] Collected: 03/09/25 1107    Lab Status: In process Specimen: Blood from Arm, Right Updated: 03/09/25 1234    FLU/COVID Rapid Antigen (30 min. TAT) - Preferred screening test in ED [[id number]]  (Normal) Collected: 03/09/25 1146    Lab Status: Final result Specimen: Nares from Nose Updated: 03/09/25 1206     SARS  COV Rapid Antigen Negative     Influenza A Rapid Antigen Negative     Influenza B Rapid Antigen Negative    Narrative:      This test has been performed using the [employer] Sameera 2 FLU+SARS Antigen test under the Emergency Use Authorization (EUA). This test has been validated by the  and verified by the performing laboratory. The Sameera uses lateral flow immunofluorescent sandwich assay to detect SARS-COV, Influenza A and Influenza B Antigen.     The Quidel Sameera 2 SARS Antigen test does not differentiate between SARS-CoV and SARS-CoV-2.     Negative results are presumptive and may be confirmed with a molecular assay, if necessary, for patient management. Negative results do not rule out SARS-CoV-2 or influenza infection and should not be used as the sole basis for treatment or patient management decisions. A negative test result may occur if the level of antigen in a sample is below the limit of detection of this test.     Positive results are indicative of the presence of viral antigens, but do not rule out bacterial infection or co-infection with other viruses.     All test results should be used as an adjunct to clinical observations and other information available to the provider.    FOR PEDIATRIC PATIENTS - copy/paste COVID Guidelines URL to browser: [email or web address]    HS Troponin I 4hr [[id number]]     Lab Status: No result Specimen: Blood     Urine Microscopic [[id number]]  (Abnormal) Collected: 03/09/25 1154    Lab Status: Final result Specimen: Urine Updated: 03/09/25 1203     RBC, UA None Seen /hpf      WBC, UA None Seen /hpf      Epithelial Cells None Seen /hpf      Bacteria, UA None Seen /hpf      Hyaline Casts, UA 1-2 /lpf      MUCUS THREADS Occasional    UA w Reflex to Microscopic w Reflex to Culture [[id number]]  (Abnormal) Collected: 03/09/25 1154    Lab Status: Final result Specimen: Urine Updated: 03/09/25 1158     Color, UA Straw      Clarity, UA Clear     Specific Gravity, UA 1.010     pH, UA 8.0     Leukocytes, UA Negative     Nitrite, UA Negative     Protein, UA 15 (Trace) mg/dl      Glucose, UA Negative mg/dl      Ketones, UA Negative mg/dl      Bilirubin, UA Negative     Occult Blood, UA Negative     UROBILINOGEN UA Negative mg/dL     Lipase [[id number]]  (Normal) Collected: 03/09/25 1107    Lab Status: Final result Specimen: Blood from Arm, Right Updated: 03/09/25 1158     Lipase 21 u/L     Comprehensive metabolic panel [[id number]]  (Abnormal) Collected: 03/09/25 1107    Lab Status: Final result Specimen: Blood from Arm, Right Updated: 03/09/25 1151     Sodium 148 mmol/L      Potassium 4.5 mmol/L      Chloride 92 mmol/L      CO2 >45 mmol/L      ANION GAP --     BUN 19 mg/dL      Creatinine 0.75 mg/dL      Glucose 136 mg/dL      Calcium 9.5 mg/dL      AST 27 U/L      ALT 18 U/L      Alkaline Phosphatase 65 U/L      Total Protein 7.5 g/dL      Albumin 3.9 g/dL      Total Bilirubin 0.56 mg/dL      eGFR 82 ml/min/1.73sq m     Narrative:      National Kidney Disease Foundation guidelines for Chronic Kidney Disease (CKD):     Stage 1 with normal or high GFR (GFR > 90 mL/min/1.73 square meters)    Stage 2 Mild CKD (GFR = 60-89 mL/min/1.73 square meters)    Stage 3A Moderate CKD (GFR = 45-59 mL/min/1.73 square meters)    Stage 3B Moderate CKD (GFR = 30-44 mL/min/1.73 square meters)    Stage 4 Severe CKD (GFR = 15-29 mL/min/1.73 square meters)    Stage 5 End Stage CKD (GFR <15 mL/min/1.73 square meters)  Note: GFR calculation is accurate only with a steady state creatinine    HS Troponin 0hr (reflex protocol) [[id number]]  (Normal) Collected: 03/09/25 1107    Lab Status: Final result Specimen: Blood from Arm, Right Updated: 03/09/25 1146     hs TnI 0hr 19 ng/L     B-Type Natriuretic Peptide(BNP) [[id number]]  (Normal) Collected: 03/09/25 1107    Lab Status: Final result Specimen: Blood from Arm, Right Updated: 03/09/25 1145     BNP 62 pg/mL     D-Dimer  [[id number]]  (Abnormal) Collected: 03/09/25 1107    Lab Status: Final result Specimen: Blood from Arm, Right Updated: 03/09/25 1145     D-Dimer, Quant 3.51 ug/ml FEU     Narrative:      In the evaluation for possible pulmonary embolism, in the appropriate (Well's Score of 4 or less) patient, the age adjusted d-dimer cutoff for this patient can be calculated as:    Age x 0.01 (in ug/mL) for Age-adjusted D-dimer exclusion threshold for a patient over 50 years.    Protime-INR [[id number]]  (Normal) Collected: 03/09/25 1107    Lab Status: Final result Specimen: Blood from Arm, Right Updated: 03/09/25 1139     Protime 12.5 seconds      INR 0.90    Narrative:      INR Therapeutic Range    Indication                                             INR Range      Atrial Fibrillation                                               2.0-3.0  Hypercoagulable State                                    2.0.2.3  Left Ventricular Asist Device                            2.0-3.0  Mechanical Heart Valve                                  -    Aortic(with afib, MI, embolism, HF, LA enlargement,    and/or coagulopathy)                                     2.0-3.0 (2.5-3.5)     Mitral                                                             2.5-3.5  Prosthetic/Bioprosthetic Heart Valve               2.0-3.0  Venous thromboembolism (VTE: VT, PE        2.0-3.0    APTT [[id number]]  (Normal) Collected: 03/09/25 1107    Lab Status: Final result Specimen: Blood from Arm, Right Updated: 03/09/25 1139     PTT 25 seconds     Lactic acid, plasma (w/reflex if result > 2.0) [[id number]]  (Abnormal) Collected: 03/09/25 1107    Lab Status: Final result Specimen: Blood from Arm, Right Updated: 03/09/25 1138     LACTIC ACID 2.8 mmol/L     Narrative:      Result may be elevated if tourniquet was used during collection.    CBC and differential [[id number]]  (Abnormal) Collected: 03/09/25 1107    Lab Status: Final result Specimen: Blood from Arm, Right Updated:  03/09/25 1130     WBC 8.49 Thousand/uL      RBC 3.42 Million/uL      Hemoglobin 10.9 g/dL      Hematocrit 40.1 %       fL      MCH 31.9 pg      MCHC 27.2 g/dL      RDW 12.8 %      MPV 9.6 fL      Platelets 123 Thousands/uL      nRBC 0 /100 WBCs      Segmented % 71 %      Immature Grans % 0 %      Lymphocytes % 17 %      Monocytes % 10 %      Eosinophils Relative 2 %      Basophils Relative 0 %      Absolute Neutrophils 5.99 Thousands/µL      Absolute Immature Grans 0.02 Thousand/uL      Absolute Lymphocytes 1.46 Thousands/µL      Absolute Monocytes 0.85 Thousand/µL      Eosinophils Absolute 0.16 Thousand/µL      Basophils Absolute 0.01 Thousands/µL     Blood culture #1 [[id number]] Collected: 03/09/25 1107    Lab Status: In process Specimen: Blood from Arm, Right Updated: 03/09/25 1123    Blood culture #2 [[id number]] Collected: 03/09/25 1107    Lab Status: In process Specimen: Blood from Arm, Right Updated: 03/09/25 1123    Fingerstick Glucose (POCT) [[id number]]  (Normal) Collected: 03/09/25 1104    Lab Status: Final result Specimen: Blood Updated: 03/09/25 1105     POC Glucose 133 mg/dl             CTA dissection protocol chest abdomen pelvis w wo contrast   Final Interpretation by Antwon Hernandez MD (03/09 1420)      Interval appearance of a dense consolidation occupying the majority of the right lower lobe.   New occlusion of proximal right lower and middle lobe bronchi.      No pulmonary embolism identified allowing for mild respiratory motion artifact peripherally.      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      Redemonstrated 8 mm right middle lobe nodule.   Continue with February 2025 recommendation of 6-month chest CT follow-up from that time.      The study was marked in EPIC for immediate notification.               Workstation performed: [id number]         CT head without contrast   Final Interpretation by Samantha Delgado MD (03/09 1400)      1.  No acute intracranial CT  abnormality.   2.  Stable right greater than left anterior inferior bilateral frontal lobe encephalomalacia, likely sequela of remote trauma or infarct..   3.  Chronic microangiopathic change.   4.  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                  Resident: Kin Miller I, the attending radiologist, have reviewed the images and agree with the final report above.      Workstation performed: [id number]         XR chest 1 view portable   Final Interpretation by Suleiman Chauhan MD (03/09 1244)      Endotracheal and enteric tube as above.      Dense right lower lobe consolidation, suspicious for pneumonia /aspiration pneumonitis.            Resident: Ivan Duran I, the attending radiologist, have reviewed the images and agree with the final report above.      Workstation performed: [id number]             ECG 12 Lead Documentation Only    Date/Time: 3/9/2025 11:35 AM    Performed by: Freddy Hightower MD  Authorized by: Freddy Hightower MD    Indications / Diagnosis:  Ams resp failure  Patient location:  ED  Previous ECG:     Previous ECG:  Compared to current    Comparison ECG info:  Unchanged from 3 sep 24  Interpretation:     Interpretation: non-specific    Rate:     ECG rate:  109  Rhythm:     Rhythm: sinus rhythm    Ectopy:     Ectopy: PVCs    QRS:     QRS axis:  Normal  ST segments:     ST segments:  Normal  Comments:      Qt nml      ED Medication and Procedure Management   Prior to Admission Medications   Prescriptions Last Dose Informant Patient Reported? Taking?   budesonide (Pulmicort) 0.5 mg/2 mL nebulizer solution   No No   Sig: Take 2 mL (0.5 mg total) by nebulization 2 (two) times a day Rinse mouth after use.   ipratropium-albuterol (DUO-NEB) 0.5-2.5 mg/3 mL nebulizer solution   No No   Sig: Take 3 mL by nebulization 2 (two) times a day   losartan (COZAAR) 25 mg tablet   No No   Sig: Take 1 tablet (25 mg total) by  mouth 2 (two) times a day   pantoprazole (PROTONIX) 40 mg tablet   No No   Sig: Take 1 tablet (40 mg total) by mouth daily   senna-docusate sodium (Senna Plus) 8.6-50 mg per tablet   No No   Sig: Take 1 tablet by mouth daily      Facility-Administered Medications: None     Patient's Medications   Discharge Prescriptions    No medications on file     No discharge procedures on file.  ED SEPSIS DOCUMENTATION   Time reflects when diagnosis was documented in both MDM as applicable and the Disposition within this note       Time User Action Codes Description Comment    3/9/2025 12:21 PM Freddy Hightower [R41.82] Altered mental status, unspecified altered mental status type     3/9/2025 12:21 PM Freddy Hightower [J96.90] Respiratory failure (HCC)     3/9/2025 12:21 PM Freddy Hightower [J18.9] Pneumonia     3/9/2025  1:02 PM Freddy Hightower [R06.89] Hypercapnia                  Freddy Hightower MD  03/09/25 1302       Freddy Hightower MD  03/09/25 1302       Freddy Hightower MD  03/09/25 1422       Freddy Hightower MD  03/09/25 1458

## 2025-03-09 NOTE — ED PROCEDURE NOTE
US Guided Peripheral IV    Date and Time:     Performed by: Traci Dow PA-C  Authorized by: Traci Dow PA-C    Patient location:  ED  Performed by:  NP/PA  Consent: Verbal: Emergent Situation  Other Assisting Provider: No    Indications:     Indications: difficulty obtaining IV access      Image availability:  Not saved  Procedure details:     Patient evaluated for contraindications to access (i.e. fistula, thrombosis, etc): Yes      Standard clean technique used for ultrasound access: Yes      Location:  right neck / External Jugular    Catheter size:  20 gauge    Number of attempts:  1    Successful placement: yes    Post-procedure details:     Post-procedure:  Dressing applied    Blood work Obtained from this site: No    Assessment: free fluid flow and no signs of infiltration      Post-procedure complications: none      Patient tolerance of procedure:  Tolerated well, no immediate complications       Traci Dow PA-C  03/09/25 1209       Traci Dow PA-C  03/09/25 1210

## 2025-03-09 NOTE — ED PROCEDURE NOTE
Procedure  IO Line    Date/Time: 3/9/2025 11:29 AM    Performed by: Guadalupe Pulido PA-C  Authorized by: Guadalupe Pulido PA-C    Patient location:  ED  Other Assisting Provider: Yes (comment) (Traci Dow PA-C)    Consent:     Consent obtained:  Emergent situation  Universal protocol:     Patient identity confirmed:  Arm band  Indication:     Indications: clinical deterioration, hemodynamic instability, medication administration and rapid vascular access    Pre-procedure details:     Site preparation:  Alcohol  Anesthesia (see MAR for exact dosages):     Anesthesia method:  None  Procedure details:     Insertion site:  Proximal tibia    Laterality:  Right    Insertion device:  Drill device    IO Size:  25mm (blue)    Insertion: Needle was inserted through the bony cortex      Number of attempts:  1    Successful placement: yes      Insertion confirmation:  Easy infusion of fluids and stability of the needle  Post-procedure details:     Secured with:  Transparent dressing and protective shield    Patient tolerance of procedure:  Tolerated well, no immediate complications                   Guadalupe Pulido PA-C  03/09/25 1511

## 2025-03-09 NOTE — RESPIRATORY THERAPY NOTE
"   03/1935   Respiratory Assessment   Assessment Type During-treatment   General Appearance Sedated   Respiratory Pattern Assisted   Chest Assessment Chest expansion symmetrical   Vent Information   Vent ID [id number]   Vent type Webster G5   Webster Vent Mode (S)CMV   $ Pulse Oximetry Spot Check Charge Completed   SpO2 94 %   (S)CMV Settings   Resp Rate (BPM) 18 BPM   VT (mL) 400 mL   FIO2 (%) 60 %   PEEP (cmH2O) 6 cmH2O   I:E Ratio 1:2.3   Insp Time (%) 1 %   Flow Trigger (LPM) 2   Humidification Heater   Heater Temperature (Set) 98.6 °F (37 °C)   Pause Time (%) 0 %   (S)CMV Actuals   Resp Rate (BPM) 18 BPM   VT (mL) 411   MV 7.4   MAP (cmH2O) 11 cmH2O   Peak Pressure (cmH2O) 23 cmH2O   I:E Ratio (Obs) 1:2.3   Insp Resistance 20   Heater Temperature (Obs) 98.6 °F (37 °C)   Static Compliance (mL/cmH20) 37.7 mL/cmH2O   (S)CMV Alarms   High Peak Pressure (cmH2O) 40   Low Pressure (cmH2O) 5 cm H2O   High Resp Rate (BPM) 40 BPM   Low Resp Rate (BPM) 8 BPM   High MV (L/min) 20 L/min   Low MV (L/min) 3 L/min   High VT (mL) 1000 mL   Low VT (mL) 250 mL   Leak (%) 0 %   Apnea Time (s) 20 S   Maintenance   Alarm (pink) cable attached No   Resuscitation bag with peep valve at bedside No   Water bag changed No   Circuit changed No   Daily Screen   Patient safety screen outcome: Failed   Not Ready for Weaning due to: Underline problem not resolved   IHI Ventilator Associated Pneumonia Bundle   Daily Assessment of Readiness to Extubate Not applicable (Comment)   Head of Bed Elevated HOB 30   Adult IBW/VT Calculations   Height 5' 2.99\" (1.6 m)   IBW (Ideal Body Weight) 56.88 kg   Range VT 6 ML/Kg 341.28 mL/kg   Range VT 8 ML/Kg 455.04 mL/kg   ETT  Oral 8 mm   Placement Date/Time: 03/09/25 1105   Mask Ventilation: Ventilated by mask (1)  Type: Oral  Tube Size: 8 mm  Insertion: Atraumatic  Insertion attempts: 1  Placement Verification: Chest x-ray  Secured at (cm): 22 at gums  Comments: no teeth  Placed By: ...   Secured at " (cm) 23   Measured from Lips   Secured Location Center   Secured by Commercial tube galvin   Cuff Pressure (color) Green   HI-LO Suction  Intermittent suction   HI-LO Secretions Scant   HI-LO Intervention Patent

## 2025-03-09 NOTE — ED NOTES
Patient transported to CT accompanied by RT, primary RN, ED tech, and ED provider     Rama Richards, SCHUYLER  03/09/25 1750

## 2025-03-09 NOTE — PROCEDURES
Arterial Line Insertion    Date/Time: 3/9/2025 4:26 PM    Performed by: Nick Sanabria MD  Authorized by: Nick Sanabria MD    Patient location:  Bedside and ICU  Consent:     Consent obtained:  Emergent situation  Universal protocol:     Site/side marked: yes      Immediately prior to procedure a time out was called: yes      Patient identity confirmed:  Arm band and hospital-assigned identification number  Indications:     Indications: hemodynamic monitoring, multiple ABGs and continuous blood pressure monitoring    Pre-procedure details:     Skin preparation:  Chlorhexidine    Preparation: Patient was prepped and draped in sterile fashion    Anesthesia (see MAR for exact dosages):     Anesthesia method:  Local infiltration    Local anesthetic:  Lidocaine 1% w/o epi  Procedure details:     Location / Tip of Catheter:  Radial    Laterality:  Left    Needle gauge:  20 G    Placement technique:  Ultrasound guided    Ultrasound image availability:  Not saved    Sterile ultrasound techniques: Sterile gel and sterile probe covers were used      Number of attempts:  1    Successful placement: yes      Transducer: waveform confirmed and dampened amplitude    Post-procedure details:     Post-procedure:  Secured with tape, sterile dressing applied, sutured and biopatch applied    CMS:  Normal    Patient tolerance of procedure:  Tolerated well, no immediate complications

## 2025-03-09 NOTE — ASSESSMENT & PLAN NOTE
Continuous cardiopulmonary monitoring  Treat hypertension as needed  Patient is hypotensive at this time  Continue IV pressors  Maintain MAP of 65

## 2025-03-09 NOTE — SEPSIS NOTE
"  Sepsis Note   Severiano Feliciano 89 y.o. male MRN: [id number]  Unit/Bed#: ICU 03 Encounter: [id number]       Initial Sepsis Screening       Row Name 03/09/25 1649                Is the patient's history suggestive of a new or worsening infection? Yes (Proceed)  -EG        Suspected source of infection pneumonia  -EG        Indicate SIRS criteria Hyperthemia > 38.3C (100.9F) OR Hypothermia <36C (96.8F);Tachycardia > 90 bpm  -EG        Are two or more of the above signs & symptoms of infection both present and new to the patient? Yes (Proceed)  -EG        Assess for evidence of organ dysfunction: Are any of the below criteria present within 6 hours of suspected infection and SIRS criteria that are NOT considered to be chronic conditions? SBP < 90;Lactate > 2.0;New need for invasive/non-invasive ventilation  -EG        Date of presentation of severe sepsis 03/09/25  -EG        Time of presentation of severe sepsis --  Unknown the patient was in severe sepsis in the emergency department at [facility]  -EG        Date of presentation of septic shock 03/09/25  -EG        Time of presentation of septic shock --  Unknown, it was when the patient presented to [facility]  -EG        Fluid Resuscitation: 30 ml/kg IV fluid bolus will be given based on actual body weight  -EG        Is the patient is persistently hypotensive in the hour after fluid bolus administration? If yes, patient meets criteria for vasopressor use. YES  -EG        Sepsis Note: Click \"NEXT\" below (NOT \"close\") to generate sepsis note based on above information. YES (proceed by clicking \"NEXT\")  -EG                  User Key  (r) = Recorded By, (t) = Taken By, (c) = Cosigned By      Initials Name Provider Type    EG RAQUEL Cooley Nurse Practitioner                Patient received 1500 cc of fluid and Gates, an additional 500 cc of fluid on arrival to [facility].        There is no height or weight " on file to calculate BMI.  Wt Readings from Last 1 Encounters:   02/13/25 59.9 kg (132 lb)        Ideal body weight: 56.9 kg (125 lb 7.1 oz)  Adjusted ideal body weight: 58.1 kg (128 lb 1 oz)

## 2025-03-09 NOTE — ASSESSMENT & PLAN NOTE
Lab Results   Component Value Date    HGBA1C 6.2 (H) 09/05/2024       Recent Labs     03/09/25  1104   POCGLU 133       Blood Sugar Average: Last 72 hrs:  Hold home antihyperglycemics  Sliding scale insulin while in the ICU  Fingerstick blood glucose every 6 hours

## 2025-03-09 NOTE — H&P
H&P - Critical Care/ICU   Name: Severiano Feliciano 89 y.o. male I MRN: [id number]  Unit/Bed#: ICU 03 I Date of Admission: 3/9/2025   Date of Service: 3/9/2025 I Hospital Day: 0       Assessment & Plan  Acute exacerbation of chronic obstructive pulmonary disease (COPD) (HCC)  Patient presented to [facility] with complaints of shortness of breath  Patient was hypoxic and required intubation and mechanical ventilation  Continue sedation while intubated  Transferred to [facility] intensive care unit  Continuous cardiopulmonary monitoring   x-ray demonstrates right lower lobe infiltrate possibly representing pneumonia  Monitor white count  Continue antibiotics as ordered  Consider IV Solu-Medrol  Monitor fever curve  Repeat chest x-ray in the a.m.  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, psychotic disturbance, mood disturbance, or anxiety ([facility])  Redirect patient as needed  Admit to the intensive care unit  Sedation while intubated  Hypertension  Continuous cardiopulmonary monitoring  Treat hypertension as needed  Patient is hypotensive at this time  Continue IV pressors  Maintain MAP of 65  Bilateral hearing loss  Maintain hearing aids if in patient's possession  Speak loudly when addressing patient  Aneurysm, carotid artery, internal  Status post stenting  Monitor blood pressure, treat hypertension as needed  Continue Cozaar  Chronic diastolic (congestive) heart failure (HCC)  Wt Readings from Last 3 Encounters:   02/13/25 59.9 kg (132 lb)   02/02/25 60.3 kg (132 lb 15 oz)   11/05/24 60.1 kg (132 lb 9.6 oz)   Daily weights  Strict I's and O's  Diuretics as needed          JAY treated with BiPAP    Type 2 diabetes mellitus with diabetic peripheral angiopathy without gangrene, without long-term current use of insulin ([facility])  Lab Results   Component Value Date    HGBA1C 6.2 (H) 09/05/2024        Recent Labs     03/09/25  1104   POCGLU 133       Blood Sugar Average: Last 72 hrs:  Hold home antihyperglycemics  Sliding scale insulin while in the ICU  Fingerstick blood glucose every 6 hours    Disposition: Critical care    History of Present Illness   Severiano Feliciano is a 89 y.o. who presents as a transfer from [place].  The patient was seen at [place] emergency department secondary to altered mental status likely secondary to hypercapnia secondary to his history of COPD.  Upon arrival to [facility] patient was noted to be febrile with a temperature of 102, he was hypoxic and hypercapnic and therefore intubated and placed on mechanical ventilation.  Chest x-ray showed a right lower lobe infiltrate possibly related to aspiration pneumonia.    Patient arrived on an epinephrine drip.  He was hypotensive on arrival with a systolic blood pressure in the 50s.  He was switched to norepinephrine with improvement of his blood pressure to 98 systolic.  Patient is intubated and sedated and shivering.  Shivering is likely secondary to fever.  He is found to have a right EJ and right lower extremity IO right ankle IV and left antecubital IV.    Upon arrival the patient received a arterial line to accurately measure blood pressure and facilitate arterial blood gases.  The patient does not respond to questions or follow commands likely secondary to sedation he does withdraw from pain.  We will likely place a central line and remove the IO.    Patient will be treated with antibiotics and IV fluids and maintained on a mechanical ventilator.  Sedation will be continued.  We will provide continuous cardiopulmonary monitoring.    History obtained from chart review.  Review of Systems: See HPI for Review of Systems    Historical Information   Past Medical History:  06/13/2020: Acute metabolic encephalopathy  04/04/2023: Age-related cataract of right eye      Comment:  patient is medically cleared  and presents with low risk                of complication with this upcoming R cataract surgery.  No date: Anemia  No date: Aneurysm, aorta, thoracic (HCC)  No date: Appendicolith  No date: Ascending aortic aneurysm (HCC)      Comment:  3.7  No date: Asthma  No date: BPH (benign prostatic hyperplasia)  No date: CAD (coronary artery disease)      Comment:  noted on CT scan  No date: CHF (congestive heart failure) ([facility])  No date: COPD (chronic obstructive pulmonary disease) ([facility])  04/25/2024: Delirium  No date: Descending thoracic aortic aneurysm (HCC)  No date: Diabetes mellitus ([facility])  No date: Diverticulosis  No date: Former tobacco use  No date: GERD (gastroesophageal reflux disease)  No date: History of DVT (deep vein thrombosis)      Comment:  Left leg  No date: History of transfusion  No date: Hypertension  04/30/2023: Hypoxemia  No date: Inguinal hernia      Comment:  right  No date: Nephrolithiasis  No date: Oxygen dependent      Comment:  2LNC  No date: Oxygen dependent  No date: Pneumonia  No date: Pre-diabetes  No date: Prostate calculus  No date: PVD (peripheral vascular disease) ([facility])  01/04/2023: Recurrent right inguinal hernia w incarcertion  09/04/2024: SIRS (systemic inflammatory response syndrome) ([facility])  02/02/2025: Solitary pulmonary nodule on lung CT      Comment:  8 x 5 mm right middle lobe nodule, most recently 5 x 3                mm (3/163) and about 4 x 2 mm in January 2024.     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[facility])  No date: Varicose vein of leg      Comment:  b/l Past Surgical History:  No date: CARDIAC SURGERY  No date: ESOPHAGOGASTRODUODENOSCOPY  1/21/2019: HERNIA REPAIR; Right      Comment:  Procedure: REPAIR HERNIA INGUINAL WITH MESH;  Surgeon:                David Lowry MD;   Location:  MAIN OR;  Service:                General  2023: HERNIA REPAIR; Right      Comment:  Procedure: REPAIR RECURRENT INCARERATED HERNIA INGUINAL                OPEN, RIGHT ORCHIECTOMY;  Surgeon: Mason Bertrand MD;                 Location: [facility];  Service: General  No date: INGUINAL HERNIA REPAIR; Bilateral  2018: IR TEVAR  2018: UT EVASC RPR DTA COVERAGE ART ORIGIN 1ST ENDOPROSTH; N/A      Comment:  Procedure: TEVAR - endovascular thoracic aortic aneurysm               repair;  Surgeon: Gladis Ortega MD;  Location: [facility]                OR;  Service: Vascular  2018: THORACIC AORTIC ANEURYSM REPAIR  No date: VARICOSE VEIN SURGERY; Bilateral      Comment:  vein stripping   Current Outpatient Medications   Medication Instructions    budesonide (PULMICORT) 0.5 mg, Nebulization, 2 times daily, Rinse mouth after use.    ipratropium-albuterol (DUO-NEB) 0.5-2.5 mg/3 mL nebulizer solution 3 mL, Nebulization, 2 times daily    losartan (COZAAR) 25 mg, Oral, 2 times daily    pantoprazole (PROTONIX) 40 mg, Oral, Daily    senna-docusate sodium (Senna Plus) 8.6-50 mg per tablet 1 tablet, Oral, Daily    Allergies   Allergen Reactions    Penicillins Hives, Itching and Rash    Lisinopril Rash     Side pains and rash     Lasix [Furosemide] Other (See Comments)     unknown    Zyprexa [Olanzapine] Tongue Swelling    Amlodipine Rash      Social History     Tobacco Use    Smoking status: Former     Current packs/day: 0.00     Average packs/day: 1 pack/day for 35.0 years (35.0 ttl pk-yrs)     Types: Cigarettes     Start date:      Quit date:      Years since quittin.2    Smokeless tobacco: Never   Vaping Use    Vaping status: Never Used   Substance Use Topics    Alcohol use: Never    Drug use: No    Family History   Problem Relation Age of Onset    Tuberculosis Mother     No Known Problems Father     Cancer Sister     Diabetes Family     Hypertension Family           Objective :                    Vitals I/O      Most Recent Min/Max in 24hrs   Temp   Temp  Min: 100.4 °F (38 °C)  Max: 102.1 °F (38.9 °C)   Pulse   Pulse  Min: 86  Max: 122   Resp   Resp  Min: 15  Max: 28   BP   BP  Min: 50/30  Max: 178/105   O2 Sat 90 % SpO2  Min: 79 %  Max: 100 %    No intake or output data in the 24 hours ending 03/09/25 1557    Diet NPO    Invasive Monitoring           Physical Exam   Physical Exam  Vitals reviewed.   Eyes:      Comments: Right pupil is normal 2 mm, left pupil is elongated   Skin:     General: Skin is warm and dry.   HENT:      Head: Normocephalic and atraumatic.      Nose:      Comments: Normal     Mouth/Throat:      Comments: Oropharynx is intubated  Neck:      Comments: Supple no JVD no lymphadenopathy trachea midline  Cardiovascular:      Rate and Rhythm: Regular rhythm. Tachycardia present.      Pulses: Normal pulses.      Comments: Tachycardic, regular, no murmur rub or gallop  Musculoskeletal:      Comments: There is stiffness to the upper extremities   Abdominal: General: Bowel sounds are normal.      Palpations: Abdomen is soft.   Constitutional:       Appearance: He is ill-appearing.      Interventions: He is intubated.   Pulmonary:      Effort: He is intubated.      Comments: Clear to auscultation bilaterally respirations per the vent  Neurological:      Comments: Intubated and sedated, Intubated and sedated and Intubated and sedated   Genitourinary/Anorectal:  Hardin present.        Diagnostic Studies        Lab Results: I have reviewed the following results:     Medications:  Scheduled PRN   azithromycin, 500 mg, Q24H  chlorhexidine, 15 mL, Q12H KIKE  heparin (porcine), 5,000 Units, Q8H KIKE  LORazepam, ,   LORazepam, 1 mg, Once  [START ON 3/10/2025] losartan, 25 mg, Daily  norepinephrine 4 mg in 0.9% sodium chloride 250 mL, ,   pantoprazole, 40 mg, Q24H KIKE  propofol, ,       ipratropium, 0.5 mg, Q8H PRN  levalbuterol, 1.25 mg, Q8H PRN  LORazepam, ,   norepinephrine 4 mg in 0.9% sodium  chloride 250 mL, ,   propofol, ,        Continuous    norepinephrine, 1-30 mcg/min         Labs:   CBC    Recent Labs     03/09/25  1107   WBC 8.49   HGB 10.9*   HCT 40.1   *     BMP    Recent Labs     03/09/25  1107   SODIUM 148*   K 4.5   CL 92*   CO2 >45*   BUN 19   CREATININE 0.75   CALCIUM 9.5       Coags    Recent Labs     03/09/25  1107   INR 0.90   PTT 25        Additional Electrolytes  No recent results       Blood Gas    Recent Labs     03/09/25  1406   PHART 7.448   YQA7NAI 59.5*   PO2ART 51.2*   TCM0KRX 40.2*   BEART 14.2   SOURCE Radial, Right     Recent Labs     03/09/25  1406   SOURCE Radial, Right    LFTs  Recent Labs     03/09/25  1107   ALT 18   AST 27   ALKPHOS 65   ALB 3.9   TBILI 0.56       Infectious  Recent Labs     03/09/25  1107   PROCALCITONI 0.10     Glucose  Recent Labs     03/09/25  1107   GLUC 136

## 2025-03-09 NOTE — PROGRESS NOTES
Severiano Feliciano is a 89 y.o. male who is currently ordered Vancomycin IV with management by the Pharmacy Consult service.  Relevant clinical data and objective / subjective history reviewed.  Vancomycin Assessment:  Indication and Goal AUC/Trough: Pneumonia (goal -600, trough >10)  Micro:     Renal Function:  SCr: 0.75 mg/dL  CrCl: 55.2 mL/min  Renal replacement: Not on dialysis  Days of Therapy: 1  Current Dose: 1250 mg IV once  Vancomycin Plan:  New Dosin mg IV every 24 hours  Estimated AUC: 481 mcg*hr/mL  Estimated Trough: 11.2 mcg/mL  Next Level: 3/11/2025 with AM labs  Renal Function Monitoring: Daily BMP and UOP  Pharmacy will continue to follow closely for s/sx of nephrotoxicity, infusion reactions and appropriateness of therapy.  BMP and CBC will be ordered per protocol. We will continue to follow the patient’s culture results and clinical progress daily.    Lisa Mae, Pharmacist

## 2025-03-09 NOTE — ASSESSMENT & PLAN NOTE
Wt Readings from Last 3 Encounters:   02/13/25 59.9 kg (132 lb)   02/02/25 60.3 kg (132 lb 15 oz)   11/05/24 60.1 kg (132 lb 9.6 oz)   Daily weights  Strict I's and O's  Diuretics as needed

## 2025-03-09 NOTE — ASSESSMENT & PLAN NOTE
Patient presented to [facility] with complaints of shortness of breath  Patient was hypoxic and required intubation and mechanical ventilation  Continue sedation while intubated  Transferred to [facility] intensive care unit  Continuous cardiopulmonary monitoring   x-ray demonstrates right lower lobe infiltrate possibly representing pneumonia  Monitor white count  Continue antibiotics as ordered  Consider IV Solu-Medrol  Monitor fever curve  Repeat chest x-ray in the a.m.  Updraft nebulizer treatments as needed

## 2025-03-10 LAB
ANION GAP SERPL CALCULATED.3IONS-SCNC: 6 MMOL/L (ref 4–13)
BASE EXCESS BLDA CALC-SCNC: 9.4 MMOL/L
BASOPHILS # BLD AUTO: 0.02 THOUSANDS/ÂΜL (ref 0–0.1)
BASOPHILS NFR BLD AUTO: 0 % (ref 0–1)
BUN SERPL-MCNC: 22 MG/DL (ref 5–25)
CA-I BLD-SCNC: 1.13 MMOL/L (ref 1.12–1.32)
CALCIUM SERPL-MCNC: 9 MG/DL (ref 8.4–10.2)
CHLORIDE SERPL-SCNC: 100 MMOL/L (ref 96–108)
CO2 SERPL-SCNC: 38 MMOL/L (ref 21–32)
CREAT SERPL-MCNC: 0.84 MG/DL (ref 0.6–1.3)
EOSINOPHIL # BLD AUTO: 0 THOUSAND/ÂΜL (ref 0–0.61)
EOSINOPHIL NFR BLD AUTO: 0 % (ref 0–6)
ERYTHROCYTE [DISTWIDTH] IN BLOOD BY AUTOMATED COUNT: 13.1 % (ref 11.6–15.1)
GFR SERPL CREATININE-BSD FRML MDRD: 78 ML/MIN/1.73SQ M
GLUCOSE SERPL-MCNC: 145 MG/DL (ref 65–140)
GLUCOSE SERPL-MCNC: 152 MG/DL (ref 65–140)
GLUCOSE SERPL-MCNC: 154 MG/DL (ref 65–140)
GLUCOSE SERPL-MCNC: 160 MG/DL (ref 65–140)
GLUCOSE SERPL-MCNC: 179 MG/DL (ref 65–140)
HCO3 BLDA-SCNC: 34.5 MMOL/L (ref 22–28)
HCT VFR BLD AUTO: 29.1 % (ref 36.5–49.3)
HGB BLD-MCNC: 8.7 G/DL (ref 12–17)
IMM GRANULOCYTES # BLD AUTO: 0.09 THOUSAND/UL (ref 0–0.2)
IMM GRANULOCYTES NFR BLD AUTO: 1 % (ref 0–2)
LYMPHOCYTES # BLD AUTO: 0.87 THOUSANDS/ÂΜL (ref 0.6–4.47)
LYMPHOCYTES NFR BLD AUTO: 8 % (ref 14–44)
MAGNESIUM SERPL-MCNC: 2.9 MG/DL (ref 1.9–2.7)
MCH RBC QN AUTO: 31.8 PG (ref 26.8–34.3)
MCHC RBC AUTO-ENTMCNC: 29.9 G/DL (ref 31.4–37.4)
MCV RBC AUTO: 106 FL (ref 82–98)
MONOCYTES # BLD AUTO: 0.94 THOUSAND/ÂΜL (ref 0.17–1.22)
MONOCYTES NFR BLD AUTO: 8 % (ref 4–12)
NEUTROPHILS # BLD AUTO: 9.34 THOUSANDS/ÂΜL (ref 1.85–7.62)
NEUTS SEG NFR BLD AUTO: 83 % (ref 43–75)
NRBC BLD AUTO-RTO: 0 /100 WBCS
O2 CT BLDA-SCNC: 13.4 ML/DL (ref 16–23)
OXYHGB MFR BLDA: 93.8 % (ref 94–97)
PCO2 BLDA: 49.9 MM HG (ref 36–44)
PH BLDA: 7.46 [PH] (ref 7.35–7.45)
PHOSPHATE SERPL-MCNC: 4.1 MG/DL (ref 2.3–4.1)
PLATELET # BLD AUTO: 84 THOUSANDS/UL (ref 149–390)
PMV BLD AUTO: 9.8 FL (ref 8.9–12.7)
PO2 BLDA: 73.1 MM HG (ref 75–129)
POTASSIUM SERPL-SCNC: 4.3 MMOL/L (ref 3.5–5.3)
RBC # BLD AUTO: 2.74 MILLION/UL (ref 3.88–5.62)
SIMV VENT INSPIRED AIR FIO2: 50
SIMV VENT PEEP: 6
SIMV VENT TIDAL VOLUME: 400
SIMV VENT: ABNORMAL
SODIUM SERPL-SCNC: 144 MMOL/L (ref 135–147)
SPECIMEN SOURCE: ABNORMAL
VENT SIMV: 16
WBC # BLD AUTO: 11.26 THOUSAND/UL (ref 4.31–10.16)

## 2025-03-10 PROCEDURE — 94003 VENT MGMT INPAT SUBQ DAY: CPT

## 2025-03-10 PROCEDURE — 82330 ASSAY OF CALCIUM: CPT

## 2025-03-10 PROCEDURE — 83735 ASSAY OF MAGNESIUM: CPT

## 2025-03-10 PROCEDURE — 85025 COMPLETE CBC W/AUTO DIFF WBC: CPT

## 2025-03-10 PROCEDURE — 94640 AIRWAY INHALATION TREATMENT: CPT

## 2025-03-10 PROCEDURE — 82805 BLOOD GASES W/O2 SATURATION: CPT | Performed by: NURSE PRACTITIONER

## 2025-03-10 PROCEDURE — 82948 REAGENT STRIP/BLOOD GLUCOSE: CPT

## 2025-03-10 PROCEDURE — 94760 N-INVAS EAR/PLS OXIMETRY 1: CPT

## 2025-03-10 PROCEDURE — 80048 BASIC METABOLIC PNL TOTAL CA: CPT

## 2025-03-10 PROCEDURE — 84100 ASSAY OF PHOSPHORUS: CPT

## 2025-03-10 PROCEDURE — 99291 CRITICAL CARE FIRST HOUR: CPT | Performed by: INTERNAL MEDICINE

## 2025-03-10 RX ORDER — LOSARTAN POTASSIUM 25 MG/1
25 TABLET ORAL DAILY
Status: DISCONTINUED | OUTPATIENT
Start: 2025-03-11 | End: 2025-03-10

## 2025-03-10 RX ORDER — HYDROCORTISONE SODIUM SUCCINATE 100 MG/2ML
50 INJECTION INTRAMUSCULAR; INTRAVENOUS EVERY 6 HOURS SCHEDULED
Status: DISCONTINUED | OUTPATIENT
Start: 2025-03-10 | End: 2025-03-12

## 2025-03-10 RX ADMIN — INSULIN LISPRO 2 UNITS: 100 INJECTION, SOLUTION INTRAVENOUS; SUBCUTANEOUS at 07:04

## 2025-03-10 RX ADMIN — HYDROCORTISONE SODIUM SUCCINATE 50 MG: 100 INJECTION, POWDER, FOR SOLUTION INTRAMUSCULAR; INTRAVENOUS at 17:38

## 2025-03-10 RX ADMIN — CHLORHEXIDINE GLUCONATE 15 ML: 1.2 SOLUTION ORAL at 08:14

## 2025-03-10 RX ADMIN — METHYLPREDNISOLONE SODIUM SUCCINATE 40 MG: 40 INJECTION, POWDER, FOR SOLUTION INTRAMUSCULAR; INTRAVENOUS at 05:57

## 2025-03-10 RX ADMIN — IPRATROPIUM BROMIDE 0.5 MG: 0.5 SOLUTION RESPIRATORY (INHALATION) at 13:46

## 2025-03-10 RX ADMIN — SODIUM CHLORIDE, SODIUM LACTATE, POTASSIUM CHLORIDE, AND CALCIUM CHLORIDE 500 ML: .6; .31; .03; .02 INJECTION, SOLUTION INTRAVENOUS at 15:30

## 2025-03-10 RX ADMIN — INSULIN LISPRO 2 UNITS: 100 INJECTION, SOLUTION INTRAVENOUS; SUBCUTANEOUS at 00:00

## 2025-03-10 RX ADMIN — PROPOFOL 20 MCG/KG/MIN: 10 INJECTION, EMULSION INTRAVENOUS at 00:24

## 2025-03-10 RX ADMIN — HEPARIN SODIUM 5000 UNITS: 5000 INJECTION INTRAVENOUS; SUBCUTANEOUS at 05:57

## 2025-03-10 RX ADMIN — HYDROCORTISONE SODIUM SUCCINATE 50 MG: 100 INJECTION, POWDER, FOR SOLUTION INTRAMUSCULAR; INTRAVENOUS at 12:02

## 2025-03-10 RX ADMIN — HEPARIN SODIUM 5000 UNITS: 5000 INJECTION INTRAVENOUS; SUBCUTANEOUS at 13:48

## 2025-03-10 RX ADMIN — INSULIN LISPRO 2 UNITS: 100 INJECTION, SOLUTION INTRAVENOUS; SUBCUTANEOUS at 12:03

## 2025-03-10 RX ADMIN — LEVALBUTEROL HYDROCHLORIDE 1.25 MG: 1.25 SOLUTION RESPIRATORY (INHALATION) at 20:45

## 2025-03-10 RX ADMIN — IPRATROPIUM BROMIDE 0.5 MG: 0.5 SOLUTION RESPIRATORY (INHALATION) at 20:45

## 2025-03-10 RX ADMIN — AZITHROMYCIN MONOHYDRATE 500 MG: 500 INJECTION, POWDER, LYOPHILIZED, FOR SOLUTION INTRAVENOUS at 16:41

## 2025-03-10 RX ADMIN — VANCOMYCIN HYDROCHLORIDE 1250 MG: 1 INJECTION, POWDER, LYOPHILIZED, FOR SOLUTION INTRAVENOUS at 12:03

## 2025-03-10 RX ADMIN — LEVALBUTEROL HYDROCHLORIDE 1.25 MG: 1.25 SOLUTION RESPIRATORY (INHALATION) at 13:46

## 2025-03-10 RX ADMIN — CHLORHEXIDINE GLUCONATE 15 ML: 1.2 SOLUTION ORAL at 21:38

## 2025-03-10 RX ADMIN — Medication 50 MCG/HR: at 10:15

## 2025-03-10 RX ADMIN — LEVALBUTEROL HYDROCHLORIDE 1.25 MG: 1.25 SOLUTION RESPIRATORY (INHALATION) at 07:19

## 2025-03-10 RX ADMIN — FENTANYL CITRATE 50 MCG: 50 INJECTION INTRAMUSCULAR; INTRAVENOUS at 17:40

## 2025-03-10 RX ADMIN — CEFEPIME 2000 MG: 2 INJECTION, POWDER, FOR SOLUTION INTRAVENOUS at 05:57

## 2025-03-10 RX ADMIN — CEFEPIME 2000 MG: 2 INJECTION, POWDER, FOR SOLUTION INTRAVENOUS at 17:59

## 2025-03-10 RX ADMIN — FENTANYL CITRATE 50 MCG: 50 INJECTION INTRAMUSCULAR; INTRAVENOUS at 22:58

## 2025-03-10 RX ADMIN — HEPARIN SODIUM 5000 UNITS: 5000 INJECTION INTRAVENOUS; SUBCUTANEOUS at 21:38

## 2025-03-10 RX ADMIN — IPRATROPIUM BROMIDE 0.5 MG: 0.5 SOLUTION RESPIRATORY (INHALATION) at 07:19

## 2025-03-10 RX ADMIN — PANTOPRAZOLE SODIUM 40 MG: 40 INJECTION, POWDER, FOR SOLUTION INTRAVENOUS at 08:14

## 2025-03-10 RX ADMIN — LOSARTAN POTASSIUM 25 MG: 25 TABLET, FILM COATED ORAL at 08:14

## 2025-03-10 RX ADMIN — PROPOFOL 20 MCG/KG/MIN: 10 INJECTION, EMULSION INTRAVENOUS at 17:59

## 2025-03-10 NOTE — ASSESSMENT & PLAN NOTE
Patient presented to [facility] with complaints of shortness of breath  Patient was hypoxic and required intubation and mechanical ventilation  Transferred to [facility] intensive care unit   x-ray demonstrates right lower lobe infiltrate possibly representing pneumonia    Plan:  Monitor white count  Continue antibiotics as ordered  Consider IV Solu-Medrol  Monitor fever curve  Repeat chest x-ray in the a.m.  Updraft nebulizer treatments as needed  Continue bronchodilators  Daily weaning trials- hope for extubation

## 2025-03-10 NOTE — ASSESSMENT & PLAN NOTE
Treat hypertension as needed  Off vasopressors this AM  Maintain MAP of 65\  Resume outpatient antihypertensive meds when BP can tolerate it

## 2025-03-10 NOTE — RESPIRATORY THERAPY NOTE
"   03/1935   Respiratory Assessment   Assessment Type During-treatment   General Appearance Sedated   Respiratory Pattern Assisted   Chest Assessment Chest expansion symmetrical   Vent Information   Vent ID [id number]   Vent type Webster G5   Webster Vent Mode (S)CMV   $ Pulse Oximetry Spot Check Charge Completed   SpO2 94 %   (S)CMV Settings   Resp Rate (BPM) 18 BPM   VT (mL) 400 mL   FIO2 (%) 60 %   PEEP (cmH2O) 6 cmH2O   I:E Ratio 1:2.3   Insp Time (%) 1 %   Flow Trigger (LPM) 2   Humidification Heater   Heater Temperature (Set) 98.6 °F (37 °C)   Pause Time (%) 0 %   (S)CMV Actuals   Resp Rate (BPM) 18 BPM   VT (mL) 411   MV 7.4   MAP (cmH2O) 11 cmH2O   Peak Pressure (cmH2O) 23 cmH2O   I:E Ratio (Obs) 1:2.3   Insp Resistance 20   Heater Temperature (Obs) 98.6 °F (37 °C)   Static Compliance (mL/cmH20) 37.7 mL/cmH2O   (S)CMV Alarms   High Peak Pressure (cmH2O) 40   Low Pressure (cmH2O) 5 cm H2O   High Resp Rate (BPM) 40 BPM   Low Resp Rate (BPM) 8 BPM   High MV (L/min) 20 L/min   Low MV (L/min) 3 L/min   High VT (mL) 1000 mL   Low VT (mL) 250 mL   Leak (%) 0 %   Apnea Time (s) 20 S   Maintenance   Alarm (pink) cable attached No   Resuscitation bag with peep valve at bedside No   Water bag changed No   Circuit changed No   Daily Screen   Patient safety screen outcome: Failed   Not Ready for Weaning due to: Underline problem not resolved   IHI Ventilator Associated Pneumonia Bundle   Daily Assessment of Readiness to Extubate Not applicable (Comment)   Head of Bed Elevated HOB 30   Adult IBW/VT Calculations   Height 5' 2.99\" (1.6 m)   IBW (Ideal Body Weight) 56.88 kg   Range VT 6 ML/Kg 341.28 mL/kg   Range VT 8 ML/Kg 455.04 mL/kg   ETT  Oral 8 mm   Placement Date/Time: 03/09/25 1105   Mask Ventilation: Ventilated by mask (1)  Type: Oral  Tube Size: 8 mm  Insertion: Atraumatic  Insertion attempts: 1  Placement Verification: Chest x-ray  Secured at (cm): 22 at gums  Comments: no teeth  Placed By: ...   Secured at " (cm) 23   Measured from Lips   Secured Location Center   Secured by Commercial tube galvin   Cuff Pressure (color) Green   HI-LO Suction  Intermittent suction   HI-LO Secretions Scant   HI-LO Intervention Patent

## 2025-03-10 NOTE — ASSESSMENT & PLAN NOTE
Wt Readings from Last 3 Encounters:   03/10/25 67 kg (147 lb 11.3 oz)   02/13/25 59.9 kg (132 lb)   02/02/25 60.3 kg (132 lb 15 oz)     Daily weights  Strict I's and O's  Diuretics as needed

## 2025-03-10 NOTE — CASE MANAGEMENT
Case Management Assessment & Discharge Planning Note    Patient name Severiano Feliciano  Location ICU 03/ICU 03 MRN [id number]  : 1935 Date 3/10/2025       Current Admission Date: 3/9/2025  Current Admission Diagnosis:Acute exacerbation of chronic obstructive pulmonary disease (COPD) ([facility])   Patient Active Problem List    Diagnosis Date Noted Date Diagnosed    Type 2 diabetes mellitus with diabetic peripheral angiopathy without gangrene, without long-term current use of insulin ([facility]) 2025     Encounter for support and coordination of transition of care 2025     Solitary pulmonary nodule on lung CT 2025     JAY treated with BiPAP 2024     Elevated troponin 2024     Acute exacerbation of chronic obstructive pulmonary disease (COPD) ([facility]) 2024     Pulmonary hypertension ([facility]) 2024     Aneurysm, carotid artery, internal 2024     Bilateral hearing loss 10/25/2023     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, psychotic disturbance, mood disturbance, or anxiety ([facility]) 10/11/2022     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     Dysphagia 2020     Chronic diastolic (congestive) heart failure ([facility]) 2019     Thrombocytopenia ([facility]) 2018     Acute on chronic respiratory failure with hypoxia and hypercapnia ([facility]) 2018     Descending aortic aneurysm ([facility]) 2018     Hypertension 2018     COPD, severe ([facility]) 2018       LOS (days): 1  Geometric Mean LOS (GMLOS) (days):   Days to GMLOS:     OBJECTIVE:  PATIENT READMITTED TO HOSPITAL  Risk of Unplanned Readmission Score: 27.96      Current admission status: Inpatient  Referral Reason: Other (Discharge planning)    Preferred Pharmacy:   [facility] [place] - [address]  [address]  [place] [address]  Phone: [phone] Fax: [phone]    Primary Care Provider: Kirby Casanova MD    Primary Insurance:  [employer] [facility]  Secondary Insurance: [facility]    ASSESSMENT:  Active Health Care Proxies       Viridiana Zambrano Health Care Representative - Spouse   Primary Phone: [phone] (Mobile)  Home Phone: [phone]                 Readmission Root Cause  30 Day Readmission: Yes  During your hospital stay, did someone (provider, nurse, ) explain your care to you in a way you could understand?: Yes  Did you feel medically stable to leave the hospital?: Yes  Were you able to pay for your medication at the pharmacy?: Yes  Did you have reliable transportation to take you to your appointments?: Yes  During previous admission, was a post-acute recommendation made?: Yes  What post-acute resources were offered?: [facility]  Patient was readmitted due to: Excerbation COPD  Action Plan: TBD pending hospitalization    Patient Information  Admitted from:: Home  Mental Status: Intubated  During Assessment patient was accompanied by: Spouse  Assessment information provided by:: Spouse  Primary Caregiver: Spouse  Caregiver's Name:: Viridiana Zambrano (Spouse) [phone]  Caregiver's Relationship to Patient:: Family Member  Caregiver's Telephone Number:: Viridiana Zambrano (Spouse) [phone]  Support Systems: Spouse/significant other  County of Residence: [place]  What city do you live in?: [place]  Home entry access options. Select all that apply.: Stairs  Number of steps to enter home.: 5  Do the steps have railings?: Yes  Type of Current Residence: [address] home  Upon entering residence, is there a bedroom on the main floor (no further steps)?: No  A bedroom is located on the following floor levels of residence (select all that apply):: 2nd Floor  Upon entering residence, is there a bathroom on the main floor (no further steps)?: No  Indicate which floors of current residence have a bathroom (select all the apply):: 2nd Floor  Number of steps to 2nd floor from main floor:   (15)  Living Arrangements: Lives w/ Spouse/significant other  Is patient a ?: No    Activities of Daily Living Prior to Admission  Functional Status: Total dependent  Completes ADLs independently?: No  Level of ADL dependence: Assistance  Ambulates independently?: Yes  Does patient use assisted devices?: Yes  Assisted Devices (DME) used: Wheelchair, Walker, Straight Cane, Shower Chair, Portable Oxygen tanks, Home Oxygen concentrator  Does patient currently own DME?: Yes  What DME does the patient currently own?: Shower Chair, Home Oxygen concentrator, Portable Oxygen tanks, Wheelchair, Walker, Straight Cane  Does patient have a history of Outpatient Therapy (PT/OT)?: No  Does the patient have a history of Short-Term Rehab?: No  Does patient have a history of HHC?: Yes ([facility])  Does patient currently have HHC?: No    Patient Information Continued  Income Source: Pension/jail  Does patient have prescription coverage?: Yes  Does patient receive dialysis treatments?: No  Does patient have a history of substance abuse?: No  Does patient have a history of Mental Health Diagnosis?: No    Means of Transportation  Means of Transport to Appts:: Family transport  DISCHARGE DETAILS:    Discharge planning discussed with:: Spouse Viridiana  Freedom of Choice: Yes     CM contacted family/caregiver?: Yes    Contacts  Patient Contacts: Viridiana  Relationship to Patient:: Family  Contact Method: In Person  Reason/Outcome: Emergency Contact, Continuity of Care       Additional Comments: Patient in ICU and vented.  Met with spouse Viridiana at bedside.  Used Angela Soto #[phone] and completed CM assessment with dotty.  Spouse tearful and upset.  Emotional support provided.  CM department following thru discharge

## 2025-03-10 NOTE — PROGRESS NOTES
Progress Note - Critical Care/ICU   Name: Severiano Feliciano 89 y.o. male I MRN: [id number]  Unit/Bed#: ICU 03 I Date of Admission: 3/9/2025   Date of Service: 3/10/2025 I Hospital Day: 1      Assessment & Plan  Acute exacerbation of chronic obstructive pulmonary disease (COPD) ([facility])  Patient presented to [facility] with complaints of shortness of breath  Patient was hypoxic and required intubation and mechanical ventilation  Transferred to [facility] intensive care unit   x-ray demonstrates right lower lobe infiltrate possibly representing pneumonia    Plan:  Monitor white count  Continue antibiotics as ordered  Consider IV Solu-Medrol  Monitor fever curve  Repeat chest x-ray in the a.m.  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, psychotic disturbance, mood disturbance, or anxiety ([facility])  Redirect patient as needed  Admit to the intensive care unit  Sedation while intubated  Hypertension  Treat hypertension as needed  Off vasopressors this AM  Maintain MAP of 65\  Resume outpatient antihypertensive meds when BP can tolerate it  Bilateral hearing loss  Maintain hearing aids if in patient's possession  Aneurysm, carotid artery, internal  Status post stenting  Monitor blood pressure, treat hypertension as needed  Continue Cozaar with hold parameters once BP tolerates  Chronic diastolic (congestive) heart failure ([facility])  Wt Readings from Last 3 Encounters:   03/10/25 67 kg (147 lb 11.3 oz)   02/13/25 59.9 kg (132 lb)   02/02/25 60.3 kg (132 lb 15 oz)     Daily weights  Strict I's and O's  Diuretics as needed          JAY treated with BiPAP  Currently intubated  BIPAP when extubated qHS and prn  Type 2 diabetes mellitus with diabetic peripheral angiopathy without gangrene, without long-term current use of insulin ([facility])  Lab  Results   Component Value Date    HGBA1C 6.1 (H) 03/09/2025       Recent Labs     03/09/25  1602 03/09/25  1735 03/10/25  0025 03/10/25  0703   POCGLU 128 123 160* 154*       Blood Sugar Average: Last 72 hrs:  Hold home antihyperglycemics  Sliding scale insulin while in the ICU  Fingerstick blood glucose every 6 hours  Our goal will be to maintain his blood glucose between 140 and 180    Disposition: Critical care    ICU Core Measures     Vented Patient  VAP Bundle  VAP bundle ordered     A: Assess, Prevent, and Manage Pain Has pain been assessed? Yes  Need for changes to pain regimen? No   B: Both Spontaneous Awakening Trials (SATs) and Spontaneous Breathing Trials (SBTs) Plan to perform spontaneous awakening trial today? Yes   Plan to perform spontaneous breathing trial today? Yes   Obvious barriers to extubation? No   C: Choice of Sedation RASS Goal: 0 Alert and Calm  Need for changes to sedation or analgesia regimen? No   D: Delirium CAM-ICU: Unable to perform secondary to Acute cognitive dysfunction   E: Early Mobility  Plan for early mobility? Yes   F: Family Engagement Plan for family engagement today? Yes       Antibiotic Review: Patient on appropriate coverage based on culture data.     Review of Invasive Devices:    Wilfred Plan: Continue for accurate I/O monitoring for 48 hours    Therese Plan: Keep arterial line for hemodynamic monitoring    Prophylaxis:  VTE VTE covered by:  heparin (porcine), Subcutaneous, 5,000 Units at 03/10/25 0557       Stress Ulcer  covered bypantoprazole (PROTONIX) 40 mg tablet [[id number]] (Long-Term Med), pantoprazole (PROTONIX) injection 40 mg [[id number]]         24 Hour Events : No events overnight. Was able to be weaned off pressors. Remains intubated and sedated    Subjective   Review of Systems: Review of Systems not obtainable due to Altered mental status    Objective :                   Vitals I/O      Most Recent Min/Max in 24hrs   Temp 99.3 °F (37.4 °C) Temp  Min: 98.8 °F  (37.1 °C)  Max: 102.6 °F (39.2 °C)   Pulse 61 Pulse  Min: 58  Max: 122   Resp 16 Resp  Min: 10  Max: 35   /66 BP  Min: 50/30  Max: 197/84   O2 Sat 95 % SpO2  Min: 79 %  Max: 100 %      Intake/Output Summary (Last 24 hours) at 3/10/2025 0809  Last data filed at 3/10/2025 0600  Gross per 24 hour   Intake 389.83 ml   Output 1150 ml   Net -760.17 ml       Diet NPO    Invasive Monitoring           Physical Exam   Physical Exam  Eyes:      Pupils: Pupils are equal, round, and reactive to light.   Skin:     General: Skin is warm and dry.   HENT:      Head: Normocephalic.      Mouth/Throat:      Mouth: Mucous membranes are dry.   Cardiovascular:      Rate and Rhythm: Normal rate.   Musculoskeletal:         General: No swelling.   Abdominal:      Palpations: Abdomen is soft.      Tenderness: There is no abdominal tenderness.   Constitutional:       Appearance: He is ill-appearing.   Pulmonary:      Effort: Pulmonary effort is normal.      Breath sounds: Normal breath sounds.   Neurological:      Comments: Intubated and sedated, arousable to stimulation           Diagnostic Studies      Lab Results: I have reviewed the following results:     Medications:  Scheduled PRN   azithromycin, 500 mg, Q24H  cefepime, 2,000 mg, Q12H  chlorhexidine, 15 mL, Q12H KIKE  heparin (porcine), 5,000 Units, Q8H KIKE  insulin lispro, 2-12 Units, Q6H KIKE  ipratropium, 0.5 mg, TID  levalbuterol, 1.25 mg, TID  losartan, 25 mg, Daily  methylPREDNISolone sodium succinate, 40 mg, Q8H KIKE  pantoprazole, 40 mg, Q24H KIKE  vancomycin, 1,250 mg, Q24H      acetaminophen, 975 mg, Q6H PRN  fentaNYL, 50 mcg, Q2H PRN       Continuous    fentaNYL, 50 mcg/hr, Last Rate: 50 mcg/hr (03/1935)  propofol, 5-50 mcg/kg/min, Last Rate: 15 mcg/kg/min (03/10/25 0213)         Labs:   CBC    Recent Labs     03/09/25  1951 03/10/25  0547   WBC 11.42* 11.26*   HGB 8.7* 8.7*   HCT 30.6* 29.1*   PLT 92* 84*     BMP    Recent Labs     03/09/25  1951 03/10/25  0547    SODIUM 144 144   K 3.4* 4.3    100   CO2 37* 38*   AGAP 7 6   BUN 18 22   CREATININE 0.68 0.84   CALCIUM 8.6 9.0       Coags    Recent Labs     03/09/25 1107 03/09/25 1959   INR 0.90 1.12   PTT 25  --         Additional Electrolytes  Recent Labs     03/09/25  1951 03/09/25  1959 03/10/25  0547   MG 1.4*  --  2.9*   PHOS <1.0*  --  4.1   CAIONIZED  --  1.02* 1.13          Blood Gas    Recent Labs     03/09/25 2000   PHART 7.559*   IJI7CDB 39.9   PO2ART 81.8   NDR7YMX 34.8*   BEART 11.6   SOURCE Line, Arterial     Recent Labs     03/09/25 2000   SOURCE Line, Arterial    LFTs  Recent Labs     03/09/25 1107 03/09/25 1951   ALT 18 18   AST 27 30   ALKPHOS 65 45   ALB 3.9 3.4*   TBILI 0.56 0.92       Infectious  Recent Labs     03/09/25 1107   PROCALCITONI 0.10     Glucose  Recent Labs     03/09/25  1107 03/09/25  1951 03/10/25  0547   GLUC 136 142* 152*

## 2025-03-10 NOTE — PLAN OF CARE
Problem: SAFETY,RESTRAINT: NV/NON-SELF DESTRUCTIVE BEHAVIOR  Goal: Remains free of harm/injury (restraint for non violent/non self-detsructive behavior)  Description: INTERVENTIONS:  - Instruct patient/family regarding restraint use   - Assess and monitor physiologic and psychological status   - Provide interventions and comfort measures to meet assessed patient needs   - Identify and implement measures to help patient regain control  - Assess readiness for release of restraint   Outcome: Progressing     Problem: RESPIRATORY - ADULT  Goal: Achieves optimal ventilation and oxygenation  Description: INTERVENTIONS:  - Assess for changes in respiratory status  - Assess for changes in mentation and behavior  - Position to facilitate oxygenation and minimize respiratory effort  - Oxygen administered by appropriate delivery if ordered  - Initiate smoking cessation education as indicated  - Encourage broncho-pulmonary hygiene including cough, deep breathe, Incentive Spirometry  - Assess the need for suctioning and aspirate as needed  - Assess and instruct to report SOB or any respiratory difficulty  - Respiratory Therapy support as indicated  Outcome: Progressing     Problem: Knowledge Deficit  Goal: Patient/family/caregiver demonstrates understanding of disease process, treatment plan, medications, and discharge instructions  Description: Complete learning assessment and assess knowledge base.  Interventions:  - Provide teaching at level of understanding  - Provide teaching via preferred learning methods  Outcome: Progressing     Problem: DISCHARGE PLANNING  Goal: Discharge to home or other facility with appropriate resources  Description: INTERVENTIONS:  - Identify barriers to discharge w/patient and caregiver  - Arrange for needed discharge resources and transportation as appropriate  - Identify discharge learning needs (meds, wound care, etc.)  - Arrange for interpretive services to assist at discharge as needed  -  Refer to Case Management Department for coordinating discharge planning if the patient needs post-hospital services based on physician/advanced practitioner order or complex needs related to functional status, cognitive ability, or social support system  Outcome: Progressing     Problem: PAIN - ADULT  Goal: Verbalizes/displays adequate comfort level or baseline comfort level  Description: Interventions:  - Encourage patient to monitor pain and request assistance  - Assess pain using appropriate pain scale  - Administer analgesics based on type and severity of pain and evaluate response  - Implement non-pharmacological measures as appropriate and evaluate response  - Consider cultural and social influences on pain and pain management  - Notify physician/advanced practitioner if interventions unsuccessful or patient reports new pain  Outcome: Progressing

## 2025-03-10 NOTE — ASSESSMENT & PLAN NOTE
Lab Results   Component Value Date    HGBA1C 6.1 (H) 03/09/2025       Recent Labs     03/09/25  1602 03/09/25  1735 03/10/25  0025 03/10/25  0703   POCGLU 128 123 160* 154*       Blood Sugar Average: Last 72 hrs:  Hold home antihyperglycemics  Sliding scale insulin while in the ICU  Fingerstick blood glucose every 6 hours  Our goal will be to maintain his blood glucose between 140 and 180

## 2025-03-10 NOTE — UTILIZATION REVIEW
Initial Clinical Review    Admission: Date/Time/Statement:   Admission Orders (From admission, onward)       Ordered        03/09/25 1544  Inpatient Admission  Once                          Orders Placed This Encounter   Procedures    Inpatient Admission     Standing Status:   Standing     Number of Occurrences:   1     Level of Care:   Critical Care [15]     Estimated length of stay:   More than 2 Midnights     Certification:   I certify that inpatient services are medically necessary for this patient for a duration of greater than two midnights. See H&P and MD Progress Notes for additional information about the patient's course of treatment.     ED Arrival Information       Patient not seen in ED                           Initial Presentation: 89 y.o. male transferred from [facility] to [facility] ICU as inpatient due to acute exacerbation of COPD, dysphagia, MOD vascular dementia, HTN, hearing loss, carotid artery aneurysm    PMH  COPD, MOD Vascular dementia, HTN, bilateral hearing loss, Dm2, JAY w HS BiPAP  @ referring ED seen AMS likely secondary to hypercapnia secondary to HX of  COPD.   Arrival to [facility]  febrile with a temperature of 102, reported vomiting, hypoxic and hypercapnic intubated and placed on mechanical ventilation. Chest x-ray shows right lower lobe infiltrate possibly related to aspiration pneumonia.     EXAM  on IV Epinephrine drip, hypotensive - systolic blood pressure in the 50s. Switched to norepinephrine with improvement of blood pressure to 98 systolic, shivering. Lines incl right EJ and right lower extremity IO right ankle IV and left antecubital IV. Does not respond to questions, withdraws from pain,  CVL & 3 L IVF bolus  ICU management, mechanical vent, ABG, IV ABX: Vanco/ Cefepime/ Azithromycin; obtain MRSA swab, IV Solu medrol, scheduled Nebs TID, Arterial line placed;   Date: 3/10/2025   Day 2:   able to be weaned off pressors.   Remains intubated and  sedated, cont arterial line for HD monitoring, meyer for accurate I/O for 48 HR  Continue Vanc/Cefepime/azithromycin for now day 2. Follow-up blood and sputum cultures.   Acute on chronic hypoxemic/ hypercapnic respiratory failure does not appear in exacerbation; R LLL PNA, sepsis  TF if no extubation today; follow electrolytes; SSI. Trend thrombocytopenia- chronic issue  Date: 3/11/2025  Day 3: Has surpassed a 2nd midnight with active treatments and services.  ICU level of care  Oliguric yesterday, received 2.5L IVFL and 500 mg isolyte + albumin yesterday.   Received PRNs for sedation.  Maintain arterial line for HD monitoring, Meyer for accurate I/O  I/O +1.3  Fentanyl/propofol on hold for SAT/SBT, SAT/SBT. Passed- extubate to BiPAP.   Extubate to BiPAP and then make BiPAP qHS   Day 2 hydrocortisone to 50 mg q6 for severe CAP. Continue xopenex/atrovent TID   losartan home med- resumed w hold parameters  Nutrition consult. SLP eval once extubated. NPO, no IVF for now. PP , UO, ID: Continue Cefepime/azithromycin for now day 3. Stop vancomycin  Follow-up blood and sputum cultures     CXR 3/11/25  Congestive changes with right lower lobe infiltrate and bilateral small effusions more on the right   Afebrile  Exam   GEN awake, alert  HEENT no scleral icterus  NECK No JVD  CV regular rate  Pulm diminished  ABD soft, abdominal pain  EXT no edema  Goals of care conversations have been had many times.     ED Treatment-Medication Administration - No Administrations Displayed (No Start Event Found)       None            Scheduled Medications:  azithromycin, 500 mg, Intravenous, Q24H  cefepime, 2,000 mg, Intravenous, Q12H  chlorhexidine, 15 mL, Mouth/Throat, Q12H KIKE  heparin (porcine), 5,000 Units, Subcutaneous, Q8H KIKE  hydrocortisone sodium succinate, 50 mg, Intravenous, Q6H KIKE  insulin lispro, 2-12 Units, Subcutaneous, Q6H KIKE  ipratropium, 0.5 mg, Nebulization, TID  lactated ringers, 500 mL, Intravenous,  Once  levalbuterol, 1.25 mg, Nebulization, TID  losartan, 25 mg, Oral, Daily  pantoprazole, 40 mg, Intravenous, Q24H KIKE  vancomycin, 1,250 mg, Intravenous, Q24H      Continuous IV Infusions:  fentaNYL, 50 mcg/hr, Intravenous, Continuous  propofol, 5-50 mcg/kg/min, Intravenous, Titrated      PRN Meds:  acetaminophen, 975 mg, Oral, Q6H PRN  fentaNYL, 50 mcg, Intravenous, Q2H PRN      ED Triage Vitals   Temperature Pulse Respirations Blood Pressure SpO2 Pain Score   03/09/25 1600 03/09/25 1541 03/09/25 1541 03/09/25 1541 03/09/25 1541 03/09/25 1710   (!) 100.9 °F (38.3 °C) 83 22 (!) 66/39 90 % Med Not Given for Pain - for MAR use only     Weight (last 2 days)       Date/Time Weight    03/11/25 0600 66.9 (147.49)    03/10/25 0600 67 (147.71)    03/09/25 1545 67 (147.71)            Vital Signs (last 3 days)       Date/Time Temp Pulse Resp BP MAP (mmHg) Arterial Line BP MAP SpO2 FiO2 (%) O2 Device O2 Interface Device Andrea Coma Scale Score Pain    03/11/25 1217 -- -- -- -- -- -- -- -- -- -- -- 14 --    03/11/25 1213 -- -- -- -- -- -- -- -- -- -- Full face mask -- --    03/11/25 1200 99.5 °F (37.5 °C) 92 32 152/78 109 -- -- -- -- -- -- 11 --    03/11/25 1100 99.5 °F (37.5 °C) 95 32 155/73 107 165/82 113 mmHg 93 % -- -- -- -- --    03/11/25 1047 99.3 °F (37.4 °C) 95 26 157/73 105 -- -- 94 % -- -- -- -- --    03/11/25 1030 99.1 °F (37.3 °C) 85 22 140/68 98 156/71 102 mmHg 91 % -- -- -- -- --    03/11/25 1000 99.1 °F (37.3 °C) 82 23 151/69 99 163/68 103 mmHg 91 % -- -- -- -- --    03/11/25 0900 98.4 °F (36.9 °C) 75 20 147/69 99 -- -- 92 % -- -- -- -- --    03/11/25 0800 98.4 °F (36.9 °C) 65 31 145/63 91 -- -- 94 % -- -- -- 7 --    03/11/25 0730 98.4 °F (36.9 °C) 65 38 122/58 84 -- -- 94 % -- -- -- -- --    03/11/25 0700 98.2 °F (36.8 °C) 66 18 112/54 78 -- -- 93 % -- -- -- -- --    03/11/25 0600 98.2 °F (36.8 °C) 66 -- 115/56 80 -- -- 93 % -- -- -- -- --    03/11/25 0548 -- -- -- -- -- -- -- -- -- -- -- -- Med Not Given  for Pain - for MAR use only    03/11/25 0500 98.2 °F (36.8 °C) 69 -- 130/58 83 -- -- 93 % -- -- -- -- --    03/11/25 0400 98.1 °F (36.7 °C) 69 17 130/63 90 -- -- 95 % -- -- -- 7 --    03/11/25 0330 98.2 °F (36.8 °C) 66 -- 90/52 68 -- -- 91 % -- -- -- -- --    03/11/25 0300 98.1 °F (36.7 °C) 68 6 118/56 81 -- -- 94 % -- -- -- -- --    03/11/25 0230 98.2 °F (36.8 °C) 69 14 114/57 79 -- -- 95 % -- -- -- -- --    03/11/25 0200 98.4 °F (36.9 °C) 69 14 92/55 71 -- -- 92 % -- -- -- -- --    03/11/25 0130 98.2 °F (36.8 °C) 70 16 92/55 69 -- -- 93 % -- -- -- -- --    03/11/25 0100 98.4 °F (36.9 °C) 72 14 90/54 69 -- -- 93 % -- -- -- -- --    03/11/25 0035 98.8 °F (37.1 °C) 73 15 96/53 71 -- -- 93 % -- -- -- -- --    03/11/25 0018 99 °F (37.2 °C) 76 15 -- -- -- -- 92 % -- -- -- -- --    03/11/25 0015 99 °F (37.2 °C) 77 13 85/50 64 -- -- 91 % -- -- -- -- --    03/10/25 2320 -- -- -- -- -- -- -- 92 % -- -- -- -- --    03/10/25 2300 98.6 °F (37 °C) 102 22 144/74 101 197/90 120 mmHg 91 % -- -- -- -- --    03/10/25 2258 -- -- -- -- -- -- -- -- -- -- -- -- Med Not Given for Pain - for MAR use only    03/10/25 2200 98.8 °F (37.1 °C) 77 15 110/53 77 120/49 72 mmHg 91 % -- -- -- -- --    03/10/25 2132 99 °F (37.2 °C) 80 17 -- -- 107/45 65 mmHg 91 % -- -- -- -- --    03/10/25 2131 99 °F (37.2 °C) 80 18 -- -- 107/45 66 mmHg 91 % -- -- -- -- --    03/10/25 2130 99 °F (37.2 °C) 80 17 -- -- 108/45 66 mmHg 91 % -- -- -- -- --    03/10/25 2129 99 °F (37.2 °C) 80 15 -- -- 106/44 64 mmHg 91 % -- -- -- -- --    03/10/25 2128 99 °F (37.2 °C) 79 17 -- -- 105/45 65 mmHg 91 % -- -- -- -- --    03/10/25 2127 99 °F (37.2 °C) 80 17 -- -- 103/45 64 mmHg 92 % -- -- -- -- --    03/10/25 2126 99 °F (37.2 °C) 79 15 -- -- -- -- -- -- -- -- -- --    03/10/25 2100 99.1 °F (37.3 °C) 77 15 112/58 82 126/55 76 mmHg 92 % -- -- -- -- --    03/10/25 2057 -- -- -- -- -- -- -- 92 % -- -- -- -- --    03/10/25 2046 -- -- -- -- -- -- -- 94 % -- -- -- -- --    03/10/25  2000 99.1 °F (37.3 °C) 81 9 139/63 90 158/66 96 mmHg 94 % 50 Ventilator -- 7 --    03/10/25 1800 99.3 °F (37.4 °C) 93 17 -- -- 146/66 93 mmHg 96 % -- -- -- -- --    03/10/25 1740 -- -- -- -- -- -- -- -- -- -- -- -- Med Not Given for Pain - for MAR use only    03/10/25 1700 99 °F (37.2 °C) 78 19 -- -- 110/61 82 mmHg 94 % -- -- -- -- --    03/10/25 1600 99 °F (37.2 °C) 84 19 -- -- 143/58 85 mmHg 94 % -- -- -- -- --    03/10/25 1500 99 °F (37.2 °C) 79 20 -- -- 133/53 77 mmHg 93 % -- -- -- 7 --    03/10/25 1400 99 °F (37.2 °C) 78 20 -- -- 178/79 118 mmHg 96 % -- -- -- -- --    03/10/25 1346 -- -- -- -- -- -- -- 96 % -- -- -- -- --    03/10/25 1300 99 °F (37.2 °C) 72 16 -- -- 143/57 87 mmHg 95 % -- -- -- -- --    03/10/25 1200 99 °F (37.2 °C) 76 22 -- -- 159/68 100 mmHg 94 % -- -- -- -- --    03/10/25 1109 99.1 °F (37.3 °C) 72 17 -- -- 146/62 91 mmHg 96 % -- -- -- 6 --    03/10/25 1100 99.1 °F (37.3 °C) 72 14 -- -- 139/59 87 mmHg 95 % -- -- -- -- --    03/10/25 1000 99 °F (37.2 °C) 71 17 -- -- 171/67 103 mmHg 95 % -- -- -- -- --    03/10/25 0940 99 °F (37.2 °C) 72 16 -- -- 154/61 92 mmHg 93 % -- -- -- -- --    03/10/25 0935 99 °F (37.2 °C) 72 16 -- -- 153/60 91 mmHg 93 % -- -- -- -- --    03/10/25 0930 98.8 °F (37.1 °C) 72 16 -- -- 157/63 94 mmHg 93 % -- -- -- -- --    03/10/25 0925 98.8 °F (37.1 °C) 73 16 -- -- 164/64 99 mmHg 94 % -- -- -- -- --    03/10/25 0920 98.8 °F (37.1 °C) 72 16 -- -- 171/66 102 mmHg 94 % -- -- -- -- --    03/10/25 0915 98.8 °F (37.1 °C) 72 16 -- -- 171/68 105 mmHg 96 % -- -- -- -- --    03/10/25 0905 98.8 °F (37.1 °C) 70 16 -- -- 117/50 70 mmHg 94 % -- -- -- -- --    03/10/25 0900 98.8 °F (37.1 °C) 69 16 -- -- 114/49 69 mmHg 94 % -- -- -- -- --    03/10/25 0850 98.8 °F (37.1 °C) 70 16 -- -- 122/52 74 mmHg 95 % -- -- -- -- --    03/10/25 0845 98.8 °F (37.1 °C) 71 16 -- -- 141/59 84 mmHg 95 % -- -- -- -- --    03/10/25 0840 98.8 °F (37.1 °C) 71 16 -- -- 145/61 88 mmHg 95 % -- -- -- -- --     03/10/25 0835 98.6 °F (37 °C) 69 16 -- -- 158/65 97 mmHg 96 % -- -- -- -- --    03/10/25 0800 98.8 °F (37.1 °C) 64 16 -- -- 136/59 84 mmHg 94 % -- -- -- -- --    03/10/25 0719 -- -- -- -- -- -- -- 95 % -- -- -- 6 --    03/10/25 0700 99.3 °F (37.4 °C) 61 16 -- -- 144/62 89 mmHg 95 % -- -- -- -- --    03/10/25 0607 99 °F (37.2 °C) 67 16 -- -- 176/80 116 mmHg 99 % -- -- -- -- --    03/10/25 0606 99 °F (37.2 °C) 66 18 -- -- 179/84 119 mmHg 99 % -- -- -- -- --    03/10/25 0605 99 °F (37.2 °C) 65 22 -- -- 178/83 118 mmHg 98 % -- -- -- -- --    03/10/25 0604 99 °F (37.2 °C) 64 20 -- -- 169/77 111 mmHg 98 % -- -- -- -- --    03/10/25 0603 99 °F (37.2 °C) 65 19 -- -- 166/78 111 mmHg 98 % -- -- -- -- --    03/10/25 0602 99 °F (37.2 °C) 62 21 -- -- 166/78 111 mmHg 97 % -- -- -- -- --    03/10/25 0601 99 °F (37.2 °C) 62 20 -- -- 165/76 108 mmHg 96 % -- -- -- -- --    03/10/25 0600 99 °F (37.2 °C) 61 16 -- -- 155/68 98 mmHg 96 % -- -- -- -- --    03/10/25 0500 99 °F (37.2 °C) 60 16 140/66 95 143/60 89 mmHg 96 % -- -- -- -- --    03/10/25 0411 98.8 °F (37.1 °C) 59 16 -- -- 109/47 67 mmHg 95 % -- -- -- -- --    03/10/25 0410 98.8 °F (37.1 °C) 60 16 -- -- 114/49 70 mmHg 95 % -- -- -- -- --    03/10/25 0409 98.8 °F (37.1 °C) 60 16 -- -- 111/48 68 mmHg 95 % -- -- -- -- --    03/10/25 0408 98.8 °F (37.1 °C) 60 16 -- -- 112/48 70 mmHg 96 % -- -- -- -- --    03/10/25 0407 98.8 °F (37.1 °C) 60 16 -- -- 110/47 69 mmHg 96 % -- -- -- -- --    03/10/25 0406 98.8 °F (37.1 °C) 59 16 -- -- 121/51 74 mmHg 96 % -- -- -- -- --    03/10/25 0405 98.8 °F (37.1 °C) 59 16 -- -- 121/51 75 mmHg 96 % -- -- -- -- --    03/10/25 0400 98.8 °F (37.1 °C) 59 16 121/57 82 117/50 73 mmHg 96 % -- -- -- 7 --    03/10/25 0347 98.8 °F (37.1 °C) 60 16 -- -- 126/53 78 mmHg 96 % -- -- -- -- --    03/10/25 0346 98.8 °F (37.1 °C) 59 16 -- -- 131/55 80 mmHg 96 % -- -- -- -- --    03/10/25 0345 98.8 °F (37.1 °C) 60 16 -- -- 124/52 76 mmHg 96 % -- -- -- -- --    03/10/25  0344 98.8 °F (37.1 °C) 60 16 -- -- 116/49 72 mmHg 96 % -- -- -- -- --    03/10/25 0340 -- -- -- -- -- -- -- 97 % -- -- -- -- --    03/10/25 0300 99 °F (37.2 °C) 59 16 114/59 81 112/48 69 mmHg 96 % -- -- -- -- --    03/10/25 0207 99 °F (37.2 °C) 59 16 -- -- 109/45 66 mmHg 96 % -- -- -- -- --    03/10/25 0206 99 °F (37.2 °C) 59 16 -- -- 111/45 66 mmHg 96 % -- -- -- -- --    03/10/25 0205 99 °F (37.2 °C) 59 16 -- -- 113/45 67 mmHg 96 % -- -- -- -- --    03/10/25 0204 99 °F (37.2 °C) 58 16 -- -- 111/45 67 mmHg 96 % -- -- -- -- --    03/10/25 0203 99 °F (37.2 °C) 58 16 -- -- 113/46 67 mmHg 96 % -- -- -- -- --    03/10/25 0202 99 °F (37.2 °C) 58 16 -- -- 112/46 67 mmHg 96 % -- -- -- -- --    03/10/25 0201 99 °F (37.2 °C) 58 16 -- -- 113/46 67 mmHg 96 % -- -- -- -- --    03/10/25 0200 99 °F (37.2 °C) 58 16 116/57 82 115/47 68 mmHg 96 % -- -- -- -- --    03/10/25 0131 99 °F (37.2 °C) 58 16 -- -- 120/48 71 mmHg 96 % -- -- -- -- --    03/10/25 0130 99 °F (37.2 °C) 59 16 -- -- 116/46 69 mmHg 96 % -- -- -- -- --    03/10/25 0129 99 °F (37.2 °C) 59 16 -- -- 122/48 72 mmHg 96 % -- -- -- -- --    03/10/25 0128 99 °F (37.2 °C) 59 16 -- -- 125/51 74 mmHg 96 % -- -- -- -- --    03/10/25 0127 99 °F (37.2 °C) 59 16 -- -- 123/50 74 mmHg 96 % -- -- -- -- --    03/10/25 0126 99 °F (37.2 °C) 59 16 -- -- 130/52 77 mmHg 96 % -- -- -- -- --    03/10/25 0125 99 °F (37.2 °C) 59 16 -- -- 135/54 80 mmHg 96 % -- -- -- -- --    03/10/25 0100 99 °F (37.2 °C) 58 16 109/54 77 111/44 65 mmHg 96 % -- -- -- -- --    03/10/25 0023 99.1 °F (37.3 °C) 60 16 -- -- 120/48 71 mmHg 96 % -- -- -- -- --    03/10/25 0022 99.1 °F (37.3 °C) 60 16 -- -- 130/51 75 mmHg 96 % -- -- -- -- --    03/10/25 0021 99.1 °F (37.3 °C) 60 16 -- -- 119/48 70 mmHg 96 % -- -- -- -- --    03/10/25 0020 99.1 °F (37.3 °C) 60 16 -- -- 125/50 74 mmHg 96 % -- -- -- -- --    03/10/25 0019 99.1 °F (37.3 °C) 60 16 -- -- 128/50 75 mmHg 96 % -- -- -- -- --    03/10/25 0018 99.1 °F (37.3 °C) 61  16 -- -- 125/51 74 mmHg 96 % -- -- -- -- --    03/10/25 0017 99.1 °F (37.3 °C) 61 16 -- -- 128/51 76 mmHg 96 % -- -- -- -- --    03/10/25 0016 99 °F (37.2 °C) 60 16 -- -- 136/53 81 mmHg 96 % -- -- -- -- --    03/10/25 0000 99.1 °F (37.3 °C) 61 16 127/60 87 127/50 75 mmHg 96 % -- -- -- 7 --    03/09/25 2320 -- -- -- -- -- -- -- 95 % -- -- -- -- --    03/09/25 2317 99.5 °F (37.5 °C) 62 16 -- -- 106/41 61 mmHg 95 % -- -- -- -- --    03/09/25 2316 99.3 °F (37.4 °C) 62 16 -- -- 107/42 62 mmHg 95 % -- -- -- -- --    03/09/25 2315 99.5 °F (37.5 °C) 63 16 -- -- 107/42 62 mmHg 95 % -- -- -- -- --    03/09/25 2314 99.5 °F (37.5 °C) 63 16 -- -- 105/41 60 mmHg 95 % -- -- -- -- --    03/09/25 2300 99.9 °F (37.7 °C) 64 16 102/56 76 110/43 62 mmHg 95 % -- -- -- -- --    03/09/25 2200 100.6 °F (38.1 °C) 89 20 118/57 81 207/79 122 mmHg 100 % -- -- -- -- --    03/09/25 2100 101.5 °F (38.6 °C) 77 16 113/55 79 135/46 69 mmHg 95 % -- -- -- -- --    03/09/25 2034 -- -- -- -- -- -- -- 96 % -- -- -- -- --    03/09/25 2028 101.3 °F (38.5 °C) 80 18 -- -- 187/66 98 mmHg 96 % -- -- -- -- --    03/09/25 2027 101.3 °F (38.5 °C) 80 19 -- -- 171/63 90 mmHg 95 % -- -- -- -- --    03/09/25 2026 101.3 °F (38.5 °C) 77 18 -- -- 149/52 75 mmHg 96 % -- -- -- -- --    03/09/25 2025 101.3 °F (38.5 °C) 78 18 -- -- 142/49 71 mmHg 96 % -- -- -- -- --    03/09/25 2024 101.3 °F (38.5 °C) 77 18 -- -- 147/51 75 mmHg 95 % -- -- -- -- --    03/09/25 2023 101.1 °F (38.4 °C) 78 18 -- -- 148/50 74 mmHg 96 % -- -- -- -- --    03/09/25 2022 101.1 °F (38.4 °C) 78 18 -- -- -- -- -- -- -- -- -- --    03/09/25 2008 101.1 °F (38.4 °C) 77 18 -- -- 167/64 92 mmHg 93 % -- -- -- -- --    03/09/25 2000 101.1 °F (38.4 °C) 75 18 180/74 106 196/74 108 mmHg 97 % -- -- -- 7 --    03/09/25 1945 101.1 °F (38.4 °C) 78 18 197/84 120 217/84 126 mmHg 97 % -- -- -- -- --    03/1937 -- -- -- -- -- -- -- 94 % 60 Ventilator -- -- Med Not Given for Pain - for MAR use only    03/09/25 1930  101.1 °F (38.4 °C) 78 18 130/56 81 172/61 90 mmHg 95 % -- -- -- -- --    03/09/25 1923 101.1 °F (38.4 °C) 80 18 -- -- 222/87 136 mmHg 98 % -- -- -- -- --    03/09/25 1915 101.1 °F (38.4 °C) 78 18 170/72 103 263/263 263 mmHg 98 % -- -- -- -- --    03/09/25 1900 101.5 °F (38.6 °C) 77 18 147/67 96 147/137 136 mmHg 96 % -- -- -- -- --    03/09/25 1845 101.7 °F (38.7 °C) 78 22 -- -- 147/60 85 mmHg 96 % -- -- -- -- --    03/09/25 1840 101.7 °F (38.7 °C) 79 22 126/59 85 153/61 87 mmHg 96 % -- -- -- -- --    03/09/25 1835 101.7 °F (38.7 °C) 79 22 -- -- 159/63 90 mmHg 96 % -- -- -- -- --    03/09/25 1830 101.5 °F (38.6 °C) 79 22 121/57 80 141/58 79 mmHg 96 % -- -- -- -- --    03/09/25 1825 101.5 °F (38.6 °C) 80 22 -- -- 174/68 96 mmHg 97 % -- -- -- -- --    03/09/25 1820 101.5 °F (38.6 °C) 81 22 145/76 104 189/72 103 mmHg 98 % -- -- -- -- --    03/09/25 1810 101.5 °F (38.6 °C) 89 10 172/90 117 245/97 141 mmHg 100 % -- -- -- -- --    03/09/25 1805 101.7 °F (38.7 °C) 80 22 -- -- 205/84 114 mmHg 93 % -- -- -- -- --    03/09/25 1800 101.7 °F (38.7 °C) 83 22 70/41 50 88/35 49 mmHg 95 % -- -- -- -- --    03/09/25 1755 101.5 °F (38.6 °C) 86 31 93/52 69 127/46 66 mmHg 95 % -- -- -- -- --    03/09/25 1750 101.5 °F (38.6 °C) 85 24 -- -- 107/48 65 mmHg 96 % -- -- -- -- --    03/09/25 1745 101.5 °F (38.6 °C) 85 22 -- -- 175/73 97 mmHg 98 % -- -- -- -- --    03/09/25 1735 101.3 °F (38.5 °C) 81 22 -- -- 143/59 81 mmHg 93 % -- -- -- -- --    03/09/25 1730 101.3 °F (38.5 °C) 76 22 139/62 95 169/62 92 mmHg 96 % -- -- -- -- --    03/09/25 1720 101.5 °F (38.6 °C) 76 22 154/70 101 185/71 104 mmHg 97 % -- -- -- -- --    03/09/25 1715 101.7 °F (38.7 °C) 78 22 -- -- 168/66 94 mmHg 96 % -- -- -- -- --    03/09/25 1710 101.8 °F (38.8 °C) 81 22 139/61 93 160/65 92 mmHg 96 % -- -- -- -- Med Not Given for Pain - for MAR use only    03/09/25 1700 102.2 °F (39 °C) 78 22 176/79 114 209/71 110 mmHg 100 % -- -- -- -- --    03/09/25 1655 102.2 °F (39 °C) 89  22 -- -- 142/66 87 mmHg 99 % -- -- -- 7 --    03/09/25 1650 102.4 °F (39.1 °C) 98 22 127/82 99 158/82 104 mmHg 100 % -- -- -- -- --    03/09/25 1640 102.4 °F (39.1 °C) 91 22 78/47 57 89/41 53 mmHg 100 % -- -- -- -- --    03/09/25 1630 102.6 °F (39.2 °C) 97 23 149/82 105 150/90 108 mmHg 100 % -- -- -- -- --    03/09/25 1625 102.4 °F (39.1 °C) 97 22 143/61 88 84/52 63 mmHg 100 % -- -- -- -- --    03/09/25 1620 102.2 °F (39 °C) 102 26 196/93 133 218/92 143 mmHg 99 % -- -- -- -- --    03/09/25 1615 102 °F (38.9 °C) 107 35 -- -- 245/114 161 mmHg 98 % -- -- -- -- --    03/09/25 1600 100.9 °F (38.3 °C) 104 24 128/99 109 -- -- 95 % -- -- -- -- --    03/09/25 1541 -- 83 22 66/39 47 -- -- 90 % -- -- -- -- --              Pertinent Labs/Diagnostic Test Results:   Radiology:  XR chest portable ICU   Final Interpretation by Fabian Arias MD (03/11 1144)      Congestive changes with right lower lobe infiltrate and bilateral small effusions more on the right            Workstation performed: [id number]           Cardiology:  No orders to display     GI:  Bronchoscopy   Final Result by Lanie Baez MD (03/09 1822)   Erythematous, hyperemic mucosa in the left main stem, left upper lobar    bronchus, bronchus intermedius and right lower lobar bronchus; performed    washing   Bronchoalveolar lavage was performed x2 in the right apical segment (RB1)         RECOMMENDATION:   Follow BAL cx                        Results from last 7 days   Lab Units 03/11/25  0515 03/10/25  0547 03/09/25  1951 03/09/25  1107   WBC Thousand/uL 10.69* 11.26* 11.42* 8.49   HEMOGLOBIN g/dL 8.6* 8.7* 8.7* 10.9*   HEMATOCRIT % 28.8* 29.1* 30.6* 40.1   PLATELETS Thousands/uL 109* 84* 92* 123*   TOTAL NEUT ABS Thousands/µL  --  9.34* 8.47* 5.99         Results from last 7 days   Lab Units 03/11/25  0515 03/10/25  0547 03/09/25 1959 03/09/25 1951 03/09/25  1107   SODIUM mmol/L 142 144  --  144 148*   POTASSIUM mmol/L 3.8 4.3  --   "3.4* 4.5   CHLORIDE mmol/L 101 100  --  100 92*   CO2 mmol/L 38* 38*  --  37* >45*   ANION GAP mmol/L 3* 6  --  7  --    BUN mg/dL 31* 22  --  18 19   CREATININE mg/dL 0.80 0.84  --  0.68 0.75   EGFR ml/min/1.73sq m 80 78  --  85 82   CALCIUM mg/dL 8.7 9.0  --  8.6 9.5   CALCIUM, IONIZED mmol/L  --  1.13 1.02*  --   --    MAGNESIUM mg/dL 2.6 2.9*  --  1.4*  --    PHOSPHORUS mg/dL  --  4.1  --  <1.0*  --      Results from last 7 days   Lab Units 03/09/25 1951 03/09/25  1107   AST U/L 30 27   ALT U/L 18 18   ALK PHOS U/L 45 65   TOTAL PROTEIN g/dL 6.2* 7.5   ALBUMIN g/dL 3.4* 3.9   TOTAL BILIRUBIN mg/dL 0.92 0.56   BILIRUBIN DIRECT mg/dL 0.28*  --      Results from last 7 days   Lab Units 03/11/25  0523 03/11/25  0018 03/10/25  1735 03/10/25  1057 03/10/25  0703 03/10/25  0025 03/09/25  1735 03/09/25  1602 03/09/25  1104   POC GLUCOSE mg/dl 128 122 145* 179* 154* 160* 123 128 133     Results from last 7 days   Lab Units 03/11/25  0515 03/10/25  0547 03/09/25 1951 03/09/25  1107   GLUCOSE RANDOM mg/dL 134 152* 142* 136         Results from last 7 days   Lab Units 03/09/25  1747   HEMOGLOBIN A1C % 6.1*   EAG mg/dl 128     No results found for: \"BETA-HYDROXYBUTYRATE\"   Results from last 7 days   Lab Units 03/11/25  0642 03/10/25  1014 03/09/25  2000   PH ART  7.426 7.457* 7.559*   PCO2 ART mm Hg 54.9* 49.9* 39.9   PO2 ART mm Hg 88.0 73.1* 81.8   HCO3 ART mmol/L 35.3* 34.5* 34.8*   BASE EXC ART mmol/L 9.5 9.4 11.6   O2 CONTENT ART mL/dL 13.5* 13.4* 14.3*   O2 HGB, ARTERIAL % 95.4 93.8* 96.9   ABG SOURCE  Line, Arterial Line, Arterial Line, Arterial                 Results from last 7 days   Lab Units 03/09/25  1258 03/09/25  1107   HS TNI 0HR ng/L  --  19   HS TNI 2HR ng/L 23  --    HSTNI D2 ng/L 4  --      Results from last 7 days   Lab Units 03/09/25  1107   D-DIMER QUANTITATIVE ug/ml FEU 3.51*     Results from last 7 days   Lab Units 03/09/25  1959 03/09/25  1107   PROTIME seconds 14.6 12.5   INR  1.12 0.90   PTT " seconds  --  25     Results from last 7 days   Lab Units 03/09/25  1107   TSH 3RD GENERATON uIU/mL 0.237*     Results from last 7 days   Lab Units 03/09/25  1107   PROCALCITONIN ng/ml 0.10     Results from last 7 days   Lab Units 03/09/25  2239 03/09/25  1951 03/09/25  1258 03/09/25  1107   LACTIC ACID mmol/L 2.2* 2.5* 1.7 2.8*             Results from last 7 days   Lab Units 03/09/25  1107   BNP pg/mL 62                     Results from last 7 days   Lab Units 03/09/25  1107   LIPASE u/L 21                 Results from last 7 days   Lab Units 03/09/25  1154   CLARITY UA  Clear   COLOR UA  Straw   SPEC GRAV UA  1.010   PH UA  8.0   GLUCOSE UA mg/dl Negative   KETONES UA mg/dl Negative   BLOOD UA  Negative   PROTEIN UA mg/dl 15 (Trace)*   NITRITE UA  Negative   BILIRUBIN UA  Negative   UROBILINOGEN UA mg/dL Negative   LEUKOCYTES UA  Negative   WBC UA /hpf None Seen   RBC UA /hpf None Seen   BACTERIA UA /hpf None Seen   EPITHELIAL CELLS WET PREP /hpf None Seen   MUCUS THREADS  Occasional*                                 Results from last 7 days   Lab Units 03/09/25  1745 03/09/25  1741 03/09/25  1705 03/09/25  1107   BLOOD CULTURE  No Growth at 24 hrs. No Growth at 24 hrs.  --  No Growth at 24 hrs.  No Growth at 24 hrs.   GRAM STAIN RESULT   --   --  No Polys or Bacteria seen  --      Results from last 7 days   Lab Units 03/09/25  1707   TOTAL COUNTED  100         Results from last 7 days   Lab Units 03/11/25  0515   VANCOMYCIN RM ug/mL 15.6       Past Medical History:   Diagnosis Date    Acute metabolic encephalopathy 06/13/2020    Age-related cataract of right eye 04/04/2023    patient is medically cleared and presents with low risk of complication with this upcoming R cataract surgery.    Anemia     Aneurysm, aorta, thoracic (HCC)     Appendicolith     Ascending aortic aneurysm (HCC)     3.7    Asthma     BPH (benign prostatic hyperplasia)     CAD (coronary artery disease)     noted on CT scan    CHF (congestive  heart failure) (HCC)     COPD (chronic obstructive pulmonary disease) (HCC)     Delirium 04/25/2024    Descending thoracic aortic aneurysm (HCC)     Diabetes mellitus (HCC)     Diverticulosis     Former tobacco use     GERD (gastroesophageal reflux disease)     History of DVT (deep vein thrombosis)     Left leg    History of transfusion     Hypertension     Hypoxemia 04/30/2023    Inguinal hernia     right    Nephrolithiasis     Oxygen dependent     2LNC    Oxygen dependent     Pneumonia     Pre-diabetes     Prostate calculus     PVD (peripheral vascular disease) ([place] [facility]     Recurrent right inguinal hernia w incarcertion 01/04/2023    SIRS (systemic inflammatory response syndrome) ([facility]) 09/04/2024    Solitary pulmonary nodule on lung CT 02/02/2025    8 x 5 mm right middle lobe nodule, most recently 5 x 3 mm (3/163) and about 4 x 2 mm in January 2024.       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 male    Network Utilization Review Department  ATTENTION: Please call with any questions or concerns to [phone] and carefully listen to the prompts so that you are directed to the right person. All voicemails are confidential.   For Discharge  needs, contact Care Management DC Support Team at [phone] opt. 2  Send all requests for admission clinical reviews, approved or denied determinations and any other requests to dedicated fax number below belonging to the campus where the patient is receiving treatment. List of dedicated fax numbers for the Facilities:  FACILITY NAME UR FAX NUMBER   ADMISSION DENIALS (Administrative/Medical Necessity) [phone]   DISCHARGE SUPPORT TEAM (NETWORK) [phone]   PARENT CHILD HEALTH (Maternity/NICU/Pediatrics) [phone]   [facility] [phone]   [facility] [phone]   [facility] [phone]   [facility] [phone]   [facility] [phone]   [facility] [phone]   [facility] [phone]   [facility] [phone]   [facility] [phone]   [facility] [phone]   [facility] [phone]   [facility] [phone]

## 2025-03-10 NOTE — ASSESSMENT & PLAN NOTE
Status post stenting  Monitor blood pressure, treat hypertension as needed  Continue Cozaar with hold parameters once BP tolerates

## 2025-03-10 NOTE — PLAN OF CARE
Problem: PAIN - ADULT  Goal: Verbalizes/displays adequate comfort level or baseline comfort level  Description: Interventions:  - Encourage patient to monitor pain and request assistance  - Assess pain using appropriate pain scale  - Administer analgesics based on type and severity of pain and evaluate response  - Implement non-pharmacological measures as appropriate and evaluate response  - Consider cultural and social influences on pain and pain management  - Notify physician/advanced practitioner if interventions unsuccessful or patient reports new pain  Outcome: Progressing     Problem: DISCHARGE PLANNING  Goal: Discharge to home or other facility with appropriate resources  Description: INTERVENTIONS:  - Identify barriers to discharge w/patient and caregiver  - Arrange for needed discharge resources and transportation as appropriate  - Identify discharge learning needs (meds, wound care, etc.)  - Arrange for interpretive services to assist at discharge as needed  - Refer to Case Management Department for coordinating discharge planning if the patient needs post-hospital services based on physician/advanced practitioner order or complex needs related to functional status, cognitive ability, or social support system  Outcome: Progressing     Problem: Knowledge Deficit  Goal: Patient/family/caregiver demonstrates understanding of disease process, treatment plan, medications, and discharge instructions  Description: Complete learning assessment and assess knowledge base.  Interventions:  - Provide teaching at level of understanding  - Provide teaching via preferred learning methods  Outcome: Progressing     Problem: SAFETY,RESTRAINT: NV/NON-SELF DESTRUCTIVE BEHAVIOR  Goal: Remains free of harm/injury (restraint for non violent/non self-detsructive behavior)  Description: INTERVENTIONS:  - Instruct patient/family regarding restraint use   - Assess and monitor physiologic and psychological status   - Provide  interventions and comfort measures to meet assessed patient needs   - Identify and implement measures to help patient regain control  - Assess readiness for release of restraint   Outcome: Progressing  Goal: Returns to optimal restraint-free functioning  Description: INTERVENTIONS:  - Assess the patient's behavior and symptoms that indicate continued need for restraint  - Identify and implement measures to help patient regain control  - Assess readiness for release of restraint   Outcome: Progressing     Problem: Prexisting or High Potential for Compromised Skin Integrity  Goal: Skin integrity is maintained or improved  Description: INTERVENTIONS:  - Identify patients at risk for skin breakdown  - Assess and monitor skin integrity  - Assess and monitor nutrition and hydration status  - Monitor labs   - Assess for incontinence   - Turn and reposition patient  - Assist with mobility/ambulation  - Relieve pressure over bony prominences  - Avoid friction and shearing  - Provide appropriate hygiene as needed including keeping skin clean and dry  - Evaluate need for skin moisturizer/barrier cream  - Collaborate with interdisciplinary team   - Patient/family teaching  - Consider wound care consult   Outcome: Progressing

## 2025-03-11 ENCOUNTER — APPOINTMENT (INPATIENT)
Dept: RADIOLOGY | Facility: HOSPITAL | Age: 89
DRG: 871 | End: 2025-03-11
Payer: MEDICARE

## 2025-03-11 LAB
ANION GAP SERPL CALCULATED.3IONS-SCNC: 3 MMOL/L (ref 4–13)
BACTERIA BRONCH AEROBE CULT: NORMAL
BASE EXCESS BLDA CALC-SCNC: 9.5 MMOL/L
BUN SERPL-MCNC: 31 MG/DL (ref 5–25)
CALCIUM SERPL-MCNC: 8.7 MG/DL (ref 8.4–10.2)
CHLORIDE SERPL-SCNC: 101 MMOL/L (ref 96–108)
CO2 SERPL-SCNC: 38 MMOL/L (ref 21–32)
CREAT SERPL-MCNC: 0.8 MG/DL (ref 0.6–1.3)
ERYTHROCYTE [DISTWIDTH] IN BLOOD BY AUTOMATED COUNT: 13.5 % (ref 11.6–15.1)
GFR SERPL CREATININE-BSD FRML MDRD: 80 ML/MIN/1.73SQ M
GLUCOSE SERPL-MCNC: 111 MG/DL (ref 65–140)
GLUCOSE SERPL-MCNC: 122 MG/DL (ref 65–140)
GLUCOSE SERPL-MCNC: 128 MG/DL (ref 65–140)
GLUCOSE SERPL-MCNC: 134 MG/DL (ref 65–140)
GLUCOSE SERPL-MCNC: 93 MG/DL (ref 65–140)
GRAM STN SPEC: NORMAL
HCO3 BLDA-SCNC: 35.3 MMOL/L (ref 22–28)
HCT VFR BLD AUTO: 28.8 % (ref 36.5–49.3)
HGB BLD-MCNC: 8.6 G/DL (ref 12–17)
MAGNESIUM SERPL-MCNC: 2.6 MG/DL (ref 1.9–2.7)
MCH RBC QN AUTO: 32.3 PG (ref 26.8–34.3)
MCHC RBC AUTO-ENTMCNC: 29.9 G/DL (ref 31.4–37.4)
MCV RBC AUTO: 108 FL (ref 82–98)
MRSA NOSE QL CULT: NORMAL
O2 CT BLDA-SCNC: 13.5 ML/DL (ref 16–23)
OXYHGB MFR BLDA: 95.4 % (ref 94–97)
PCO2 BLDA: 54.9 MM HG (ref 36–44)
PH BLDA: 7.43 [PH] (ref 7.35–7.45)
PLATELET # BLD AUTO: 109 THOUSANDS/UL (ref 149–390)
PMV BLD AUTO: 10.4 FL (ref 8.9–12.7)
PO2 BLDA: 88 MM HG (ref 75–129)
POTASSIUM SERPL-SCNC: 3.8 MMOL/L (ref 3.5–5.3)
RBC # BLD AUTO: 2.66 MILLION/UL (ref 3.88–5.62)
SODIUM SERPL-SCNC: 142 MMOL/L (ref 135–147)
SPECIMEN SOURCE: ABNORMAL
VANCOMYCIN SERPL-MCNC: 15.6 UG/ML (ref 10–20)
WBC # BLD AUTO: 10.69 THOUSAND/UL (ref 4.31–10.16)

## 2025-03-11 PROCEDURE — 80202 ASSAY OF VANCOMYCIN: CPT | Performed by: NURSE PRACTITIONER

## 2025-03-11 PROCEDURE — 82948 REAGENT STRIP/BLOOD GLUCOSE: CPT

## 2025-03-11 PROCEDURE — 80048 BASIC METABOLIC PNL TOTAL CA: CPT | Performed by: NURSE PRACTITIONER

## 2025-03-11 PROCEDURE — 83735 ASSAY OF MAGNESIUM: CPT | Performed by: NURSE PRACTITIONER

## 2025-03-11 PROCEDURE — 93005 ELECTROCARDIOGRAM TRACING: CPT

## 2025-03-11 PROCEDURE — 94003 VENT MGMT INPAT SUBQ DAY: CPT

## 2025-03-11 PROCEDURE — 94760 N-INVAS EAR/PLS OXIMETRY 1: CPT

## 2025-03-11 PROCEDURE — 99291 CRITICAL CARE FIRST HOUR: CPT | Performed by: INTERNAL MEDICINE

## 2025-03-11 PROCEDURE — 71045 X-RAY EXAM CHEST 1 VIEW: CPT

## 2025-03-11 PROCEDURE — 82805 BLOOD GASES W/O2 SATURATION: CPT | Performed by: NURSE PRACTITIONER

## 2025-03-11 PROCEDURE — 94640 AIRWAY INHALATION TREATMENT: CPT

## 2025-03-11 PROCEDURE — 85027 COMPLETE CBC AUTOMATED: CPT | Performed by: NURSE PRACTITIONER

## 2025-03-11 PROCEDURE — 94002 VENT MGMT INPAT INIT DAY: CPT

## 2025-03-11 RX ORDER — ZIPRASIDONE MESYLATE 20 MG/ML
10 INJECTION, POWDER, LYOPHILIZED, FOR SOLUTION INTRAMUSCULAR ONCE
Status: DISCONTINUED | OUTPATIENT
Start: 2025-03-11 | End: 2025-03-11

## 2025-03-11 RX ORDER — DEXMEDETOMIDINE HYDROCHLORIDE 4 UG/ML
.1-.7 INJECTION, SOLUTION INTRAVENOUS
Status: DISCONTINUED | OUTPATIENT
Start: 2025-03-12 | End: 2025-03-12

## 2025-03-11 RX ADMIN — MELATONIN 6 MG: 3 TAB ORAL at 22:36

## 2025-03-11 RX ADMIN — HEPARIN SODIUM 5000 UNITS: 5000 INJECTION INTRAVENOUS; SUBCUTANEOUS at 13:30

## 2025-03-11 RX ADMIN — LEVALBUTEROL HYDROCHLORIDE 1.25 MG: 1.25 SOLUTION RESPIRATORY (INHALATION) at 13:49

## 2025-03-11 RX ADMIN — HYDROCORTISONE SODIUM SUCCINATE 50 MG: 100 INJECTION, POWDER, FOR SOLUTION INTRAMUSCULAR; INTRAVENOUS at 00:21

## 2025-03-11 RX ADMIN — HYDROCORTISONE SODIUM SUCCINATE 50 MG: 100 INJECTION, POWDER, FOR SOLUTION INTRAMUSCULAR; INTRAVENOUS at 13:00

## 2025-03-11 RX ADMIN — Medication 50 MCG/HR: at 05:48

## 2025-03-11 RX ADMIN — IPRATROPIUM BROMIDE 0.5 MG: 0.5 SOLUTION RESPIRATORY (INHALATION) at 20:35

## 2025-03-11 RX ADMIN — CHLORHEXIDINE GLUCONATE 15 ML: 1.2 SOLUTION ORAL at 22:36

## 2025-03-11 RX ADMIN — PROPOFOL 20 MCG/KG/MIN: 10 INJECTION, EMULSION INTRAVENOUS at 00:59

## 2025-03-11 RX ADMIN — CEFEPIME 2000 MG: 2 INJECTION, POWDER, FOR SOLUTION INTRAVENOUS at 18:03

## 2025-03-11 RX ADMIN — LEVALBUTEROL HYDROCHLORIDE 1.25 MG: 1.25 SOLUTION RESPIRATORY (INHALATION) at 07:46

## 2025-03-11 RX ADMIN — HEPARIN SODIUM 5000 UNITS: 5000 INJECTION INTRAVENOUS; SUBCUTANEOUS at 22:36

## 2025-03-11 RX ADMIN — CHLORHEXIDINE GLUCONATE 15 ML: 1.2 SOLUTION ORAL at 08:58

## 2025-03-11 RX ADMIN — HYDROCORTISONE SODIUM SUCCINATE 50 MG: 100 INJECTION, POWDER, FOR SOLUTION INTRAMUSCULAR; INTRAVENOUS at 18:03

## 2025-03-11 RX ADMIN — LOSARTAN POTASSIUM 25 MG: 25 TABLET, FILM COATED ORAL at 08:58

## 2025-03-11 RX ADMIN — AZITHROMYCIN MONOHYDRATE 500 MG: 500 INJECTION, POWDER, LYOPHILIZED, FOR SOLUTION INTRAVENOUS at 15:51

## 2025-03-11 RX ADMIN — HEPARIN SODIUM 5000 UNITS: 5000 INJECTION INTRAVENOUS; SUBCUTANEOUS at 05:18

## 2025-03-11 RX ADMIN — HYDROCORTISONE SODIUM SUCCINATE 50 MG: 100 INJECTION, POWDER, FOR SOLUTION INTRAMUSCULAR; INTRAVENOUS at 05:18

## 2025-03-11 RX ADMIN — IPRATROPIUM BROMIDE 0.5 MG: 0.5 SOLUTION RESPIRATORY (INHALATION) at 07:46

## 2025-03-11 RX ADMIN — IPRATROPIUM BROMIDE 0.5 MG: 0.5 SOLUTION RESPIRATORY (INHALATION) at 13:49

## 2025-03-11 RX ADMIN — PANTOPRAZOLE SODIUM 40 MG: 40 INJECTION, POWDER, FOR SOLUTION INTRAVENOUS at 08:58

## 2025-03-11 RX ADMIN — CEFEPIME 2000 MG: 2 INJECTION, POWDER, FOR SOLUTION INTRAVENOUS at 05:18

## 2025-03-11 RX ADMIN — LEVALBUTEROL HYDROCHLORIDE 1.25 MG: 1.25 SOLUTION RESPIRATORY (INHALATION) at 20:35

## 2025-03-11 NOTE — CASE MANAGEMENT
Case Management Discharge Planning Note    Patient name Severiano Feliciano  Location ICU 03/ICU 03 MRN [id number]  : 1935 Date 3/11/2025       Current Admission Date: 3/9/2025  Current Admission Diagnosis:Acute exacerbation of chronic obstructive pulmonary disease (COPD) ([facility])   Patient Active Problem List    Diagnosis Date Noted Date Diagnosed    Type 2 diabetes mellitus with diabetic peripheral angiopathy without gangrene, without long-term current use of insulin ([facility]) 2025     Encounter for support and coordination of transition of care 2025     Solitary pulmonary nodule on lung CT 2025     JAY treated with BiPAP 2024     Elevated troponin 2024     Acute exacerbation of chronic obstructive pulmonary disease (COPD) ([facility]) 2024     Pulmonary hypertension ([facility]) 2024     Aneurysm, carotid artery, internal 2024     Bilateral hearing loss 10/25/2023     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, psychotic disturbance, mood disturbance, or anxiety ([facility]) 10/11/2022     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     Dysphagia 2020     Chronic diastolic (congestive) heart failure ([facility]) 2019     Thrombocytopenia (HCC) 2018     Acute on chronic respiratory failure with hypoxia and hypercapnia ([facility]) 2018     Descending aortic aneurysm ([facility]) 2018     Hypertension 2018     COPD, severe ([facility]) 2018       LOS (days): 2  Geometric Mean LOS (GMLOS) (days):   Days to GMLOS:     OBJECTIVE:  Risk of Unplanned Readmission Score: 27.67         Current admission status: Inpatient   Preferred Pharmacy:   [facility] [place] [address] [facility] [address] [facility] [place] [address]  Phone: [phone] Fax: [phone]    Primary Care Provider: Kirby Casanova MD    Primary Insurance: [employer]ICARE [facility]  [facility]  [employer]    DISCHARGE DETAILS:    Discharge planning discussed with:: spouse and son at the bedside        Contacts  Patient Contacts: Viridiana  Relationship to Patient:: Family  Contact Method: In Person        Additional Comments: Patient in ICU and vented. Met with spouse Viridiana and son at the bedside for continued discharge planning. . Spouse tearful and upset about patient's condition, states she is worried. Emotional support provided by CM and son. CM department following thru discharge

## 2025-03-11 NOTE — PROGRESS NOTES
Progress Note - Critical Care/ICU   Name: Severiano Feliciano 89 y.o. male I MRN: [id number]  Unit/Bed#: ICU 03 I Date of Admission: 3/9/2025   Date of Service: 3/11/2025 I Hospital Day: 2       Assessment & Plan  Acute exacerbation of chronic obstructive pulmonary disease (COPD) (HCC)  Patient presented to [facility] with complaints of shortness of breath  Patient was hypoxic and required intubation and mechanical ventilation  Transferred to [facility] intensive care unit   x-ray demonstrates right lower lobe infiltrate possibly representing pneumonia    Plan:  Monitor white count  Continue antibiotics as ordered  Consider IV Solu-Medrol  Monitor fever curve  Repeat chest x-ray   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, psychotic disturbance, mood disturbance, or anxiety ([facility])  Redirect patient as needed  Sedation while intubated  Hypertension  Treat hypertension as needed  Off vasopressors since yesterday   Maintain MAP of 65  Cozaar 25 mg daily   Bilateral hearing loss  Maintain hearing aids if in patient's possession  Aneurysm, carotid artery, internal  Status post stenting  Monitor blood pressure, treat hypertension as needed  Continue Cozaar with hold parameters   Chronic diastolic (congestive) heart failure (HCC)  Wt Readings from Last 3 Encounters:   03/11/25 66.9 kg (147 lb 7.8 oz)   02/13/25 59.9 kg (132 lb)   02/02/25 60.3 kg (132 lb 15 oz)     Daily weights  Strict I's and O's  Diuretics as needed          JAY treated with BiPAP  Currently intubated  BIPAP when extubated qHS and prn  Type 2 diabetes mellitus with diabetic peripheral angiopathy without gangrene, without long-term current use of insulin ([facility])  Lab Results   Component Value Date    HGBA1C 6.1 (H) 03/09/2025       Recent Labs     03/10/25  1058  03/10/25  1735 03/11/25  0018 03/11/25  0523   POCGLU 179* 145* 122 128       Blood Sugar Average: Last 72 hrs:  Hold home antihyperglycemics  Sliding scale insulin while in the ICU  Fingerstick blood glucose every 6 hours  Our goal will be to maintain his blood glucose between 140 and 180    Disposition: Critical care    ICU Core Measures     Vented Patient  VAP Bundle  VAP bundle ordered     A: Assess, Prevent, and Manage Pain Has pain been assessed? Yes  Need for changes to pain regimen? No   B: Both Spontaneous Awakening Trials (SATs) and Spontaneous Breathing Trials (SBTs) Plan to perform spontaneous awakening trial today? Yes   Plan to perform spontaneous breathing trial today? Yes   Obvious barriers to extubation? No   C: Choice of Sedation RASS Goal: 0 Alert and Calm  Need for changes to sedation or analgesia regimen? No   D: Delirium CAM-ICU: Unable to perform secondary to Acute cognitive dysfunction   E: Early Mobility  Plan for early mobility? Yes   F: Family Engagement Plan for family engagement today? Yes       Antibiotic Review: Patient on appropriate coverage based on culture data.     Review of Invasive Devices:    Wilfred Plan: Continue for accurate I/O monitoring for 48 hours    Therese Plan: Keep arterial line for hemodynamic monitoring    Prophylaxis:  VTE VTE covered by:  heparin (porcine), Subcutaneous, 5,000 Units at 03/11/25 0518       Stress Ulcer  covered bypantoprazole (PROTONIX) 40 mg tablet [[id number]] (Long-Term Med), pantoprazole (PROTONIX) injection 40 mg [[id number]]         24 Hour Events : Oliguric yesterday, received 2.5L IVFL and 500 mg isolyte + albumin yesterday. Received PRNs for sedation. Right upper arm IV infiltrated, treated appropriately with cold compress.   Subjective   Review of Systems: Review of Systems not obtainable due to Altered mental status    Objective :                   Vitals I/O      Most Recent Min/Max in 24hrs   Temp 98.2 °F (36.8 °C) Temp  Min: 98.1 °F (36.7  °C)  Max: 99.3 °F (37.4 °C)   Pulse 66 Pulse  Min: 61  Max: 102   Resp 17 Resp  Min: 6  Max: 22   /56 BP  Min: 85/50  Max: 144/74   O2 Sat 93 % SpO2  Min: 91 %  Max: 96 %      Intake/Output Summary (Last 24 hours) at 3/11/2025 0659  Last data filed at 3/11/2025 0605  Gross per 24 hour   Intake 1695.1 ml   Output 375 ml   Net 1320.1 ml       Diet NPO    Invasive Monitoring           Physical Exam   Physical Exam  Vitals and nursing note reviewed.   Eyes:      Extraocular Movements: Extraocular movements intact.      Conjunctiva/sclera: Conjunctivae normal.      Pupils: Pupils are equal, round, and reactive to light.   Skin:     General: Skin is warm and dry.      Capillary Refill: Capillary refill takes less than 2 seconds.   HENT:      Head: Normocephalic and atraumatic.      Mouth/Throat:      Mouth: Mucous membranes are dry.   Neck:      Vascular: No JVD.   Cardiovascular:      Rate and Rhythm: Normal rate and regular rhythm.      Pulses: Normal pulses.      Heart sounds: Normal heart sounds.   Musculoskeletal:         General: No swelling.   Abdominal: General: Bowel sounds are normal. There is no distension.      Palpations: Abdomen is soft.      Tenderness: There is no abdominal tenderness. There is no guarding.   Constitutional:       Appearance: He is ill-appearing.   Pulmonary:      Comments: Diminished breath sounds.   Faint crackles throughout.   Neurological:      Comments: Intubated and sedated, arousable to stimulation.    Genitourinary/Anorectal:  Hardin present.        Diagnostic Studies        Lab Results: I have reviewed the following results:     Medications:  Scheduled PRN   azithromycin, 500 mg, Q24H  cefepime, 2,000 mg, Q12H  chlorhexidine, 15 mL, Q12H KIKE  heparin (porcine), 5,000 Units, Q8H KIKE  hydrocortisone sodium succinate, 50 mg, Q6H KIKE  insulin lispro, 2-12 Units, Q6H KIKE  ipratropium, 0.5 mg, TID  levalbuterol, 1.25 mg, TID  losartan, 25 mg, Daily  pantoprazole, 40 mg, Q24H  KIKE  vancomycin, 1,250 mg, Q24H      acetaminophen, 975 mg, Q6H PRN  fentaNYL, 50 mcg, Q2H PRN       Continuous    fentaNYL, 50 mcg/hr, Last Rate: 50 mcg/hr (03/11/25 0548)  propofol, 5-50 mcg/kg/min, Last Rate: 20 mcg/kg/min (03/11/25 0059)         Labs:   CBC    Recent Labs     03/10/25  0547 03/11/25  0515   WBC 11.26* 10.69*   HGB 8.7* 8.6*   HCT 29.1* 28.8*   PLT 84* 109*     BMP    Recent Labs     03/10/25  0547 03/11/25  0515   SODIUM 144 142   K 4.3 3.8    101   CO2 38* 38*   AGAP 6 3*   BUN 22 31*   CREATININE 0.84 0.80   CALCIUM 9.0 8.7       Coags    Recent Labs     03/09/25 1107 03/09/25 1959   INR 0.90 1.12   PTT 25  --         Additional Electrolytes  Recent Labs     03/09/25  1951 03/09/25  1959 03/10/25  0547 03/11/25  0515   MG 1.4*  --  2.9* 2.6   PHOS <1.0*  --  4.1  --    CAIONIZED  --  1.02* 1.13  --           Blood Gas    Recent Labs     03/11/25  0642   PHART 7.426   [id number] 54.9*   PO2ART 88.0   EDU9BSF 35.3*   BEART 9.5   SOURCE Line, Arterial     Recent Labs     03/11/25  0642   SOURCE Line, Arterial    LFTs  Recent Labs     03/09/25 1107 03/09/25 1951   ALT 18 18   AST 27 30   ALKPHOS 65 45   ALB 3.9 3.4*   TBILI 0.56 0.92       Infectious  Recent Labs     03/09/25 1107   PROCALCITONI 0.10     Glucose  Recent Labs     03/09/25  1107 03/09/25  1951 03/10/25  0547 03/11/25  0515   GLUC 136 142* 152* 134

## 2025-03-11 NOTE — CONSULTS
Vancomycin therapy has been discontinued. Pharmacy will sign off. Thank you for this consult. Please do not hesitate to call us with questions or re-consult us if the need arises.    Katie Osei, PharmD, [facility]  Critical Care and Internal Medicine Clinical Pharmacist  [phone] or via Secure Chat

## 2025-03-11 NOTE — ASSESSMENT & PLAN NOTE
Wt Readings from Last 3 Encounters:   03/11/25 66.9 kg (147 lb 7.8 oz)   02/13/25 59.9 kg (132 lb)   02/02/25 60.3 kg (132 lb 15 oz)     Daily weights  Strict I's and O's  Diuretics as needed

## 2025-03-11 NOTE — ASSESSMENT & PLAN NOTE
Treat hypertension as needed  Off vasopressors since yesterday   Maintain MAP of 65  Cozaar 25 mg daily

## 2025-03-11 NOTE — PLAN OF CARE
Problem: PAIN - ADULT  Goal: Verbalizes/displays adequate comfort level or baseline comfort level  Description: Interventions:  - Encourage patient to monitor pain and request assistance  - Assess pain using appropriate pain scale  - Administer analgesics based on type and severity of pain and evaluate response  - Implement non-pharmacological measures as appropriate and evaluate response  - Consider cultural and social influences on pain and pain management  - Notify physician/advanced practitioner if interventions unsuccessful or patient reports new pain  Outcome: Progressing     Problem: RESPIRATORY - ADULT  Goal: Achieves optimal ventilation and oxygenation  Description: INTERVENTIONS:  - Assess for changes in respiratory status  - Assess for changes in mentation and behavior  - Position to facilitate oxygenation and minimize respiratory effort  - Oxygen administered by appropriate delivery if ordered  - Initiate smoking cessation education as indicated  - Encourage broncho-pulmonary hygiene including cough, deep breathe, Incentive Spirometry  - Assess the need for suctioning and aspirate as needed  - Assess and instruct to report SOB or any respiratory difficulty  - Respiratory Therapy support as indicated  Outcome: Progressing     Problem: SAFETY,RESTRAINT: NV/NON-SELF DESTRUCTIVE BEHAVIOR  Goal: Remains free of harm/injury (restraint for non violent/non self-detsructive behavior)  Description: INTERVENTIONS:  - Instruct patient/family regarding restraint use   - Assess and monitor physiologic and psychological status   - Provide interventions and comfort measures to meet assessed patient needs   - Identify and implement measures to help patient regain control  - Assess readiness for release of restraint   Outcome: Progressing     Problem: Prexisting or High Potential for Compromised Skin Integrity  Goal: Skin integrity is maintained or improved  Description: INTERVENTIONS:  - Identify patients at risk for  skin breakdown  - Assess and monitor skin integrity  - Assess and monitor nutrition and hydration status  - Monitor labs   - Assess for incontinence   - Turn and reposition patient  - Assist with mobility/ambulation  - Relieve pressure over bony prominences  - Avoid friction and shearing  - Provide appropriate hygiene as needed including keeping skin clean and dry  - Evaluate need for skin moisturizer/barrier cream  - Collaborate with interdisciplinary team   - Patient/family teaching  - Consider wound care consult   Outcome: Progressing     Problem: DISCHARGE PLANNING  Goal: Discharge to home or other facility with appropriate resources  Description: INTERVENTIONS:  - Identify barriers to discharge w/patient and caregiver  - Arrange for needed discharge resources and transportation as appropriate  - Identify discharge learning needs (meds, wound care, etc.)  - Arrange for interpretive services to assist at discharge as needed  - Refer to Case Management Department for coordinating discharge planning if the patient needs post-hospital services based on physician/advanced practitioner order or complex needs related to functional status, cognitive ability, or social support system  Outcome: Progressing

## 2025-03-11 NOTE — PLAN OF CARE
Problem: PAIN - ADULT  Goal: Verbalizes/displays adequate comfort level or baseline comfort level  Description: Interventions:  - Encourage patient to monitor pain and request assistance  - Assess pain using appropriate pain scale  - Administer analgesics based on type and severity of pain and evaluate response  - Implement non-pharmacological measures as appropriate and evaluate response  - Consider cultural and social influences on pain and pain management  - Notify physician/advanced practitioner if interventions unsuccessful or patient reports new pain  3/11/2025 1354 by Elizabeth Quintana RN  Outcome: Completed  3/11/2025 0959 by Elizabeth Quintana RN  Outcome: Progressing     Problem: DISCHARGE PLANNING  Goal: Discharge to home or other facility with appropriate resources  Description: INTERVENTIONS:  - Identify barriers to discharge w/patient and caregiver  - Arrange for needed discharge resources and transportation as appropriate  - Identify discharge learning needs (meds, wound care, etc.)  - Arrange for interpretive services to assist at discharge as needed  - Refer to Case Management Department for coordinating discharge planning if the patient needs post-hospital services based on physician/advanced practitioner order or complex needs related to functional status, cognitive ability, or social support system  Outcome: Completed     Problem: Knowledge Deficit  Goal: Patient/family/caregiver demonstrates understanding of disease process, treatment plan, medications, and discharge instructions  Description: Complete learning assessment and assess knowledge base.  Interventions:  - Provide teaching at level of understanding  - Provide teaching via preferred learning methods  Outcome: Completed     Problem: RESPIRATORY - ADULT  Goal: Achieves optimal ventilation and oxygenation  Description: INTERVENTIONS:  - Assess for changes in respiratory status  - Assess for changes in mentation and behavior  -  Position to facilitate oxygenation and minimize respiratory effort  - Oxygen administered by appropriate delivery if ordered  - Initiate smoking cessation education as indicated  - Encourage broncho-pulmonary hygiene including cough, deep breathe, Incentive Spirometry  - Assess the need for suctioning and aspirate as needed  - Assess and instruct to report SOB or any respiratory difficulty  - Respiratory Therapy support as indicated  3/11/2025 1354 by Elizabeth Quintana RN  Outcome: Completed  3/11/2025 0959 by Elizabeth Quintana RN  Outcome: Progressing     Problem: SAFETY,RESTRAINT: NV/NON-SELF DESTRUCTIVE BEHAVIOR  Goal: Remains free of harm/injury (restraint for non violent/non self-detsructive behavior)  Description: INTERVENTIONS:  - Instruct patient/family regarding restraint use   - Assess and monitor physiologic and psychological status   - Provide interventions and comfort measures to meet assessed patient needs   - Identify and implement measures to help patient regain control  - Assess readiness for release of restraint   Outcome: Completed  Goal: Returns to optimal restraint-free functioning  Description: INTERVENTIONS:  - Assess the patient's behavior and symptoms that indicate continued need for restraint  - Identify and implement measures to help patient regain control  - Assess readiness for release of restraint   Outcome: Completed     Problem: Prexisting or High Potential for Compromised Skin Integrity  Goal: Skin integrity is maintained or improved  Description: INTERVENTIONS:  - Identify patients at risk for skin breakdown  - Assess and monitor skin integrity  - Assess and monitor nutrition and hydration status  - Monitor labs   - Assess for incontinence   - Turn and reposition patient  - Assist with mobility/ambulation  - Relieve pressure over bony prominences  - Avoid friction and shearing  - Provide appropriate hygiene as needed including keeping skin clean and dry  - Evaluate need for skin  moisturizer/barrier cream  - Collaborate with interdisciplinary team   - Patient/family teaching  - Consider wound care consult   Outcome: Completed

## 2025-03-11 NOTE — ASSESSMENT & PLAN NOTE
Status post stenting  Monitor blood pressure, treat hypertension as needed  Continue Cozaar with hold parameters

## 2025-03-11 NOTE — ASSESSMENT & PLAN NOTE
Patient presented to [facility] with complaints of shortness of breath  Patient was hypoxic and required intubation and mechanical ventilation  Transferred to [facility] intensive care unit   x-ray demonstrates right lower lobe infiltrate possibly representing pneumonia    Plan:  Monitor white count  Continue antibiotics as ordered  Consider IV Solu-Medrol  Monitor fever curve  Repeat chest x-ray   Updraft nebulizer treatments as needed  Continue bronchodilators  Daily weaning trials- hope for extubation

## 2025-03-11 NOTE — ASSESSMENT & PLAN NOTE
Lab Results   Component Value Date    HGBA1C 6.1 (H) 03/09/2025       Recent Labs     03/10/25  1057 03/10/25  1735 03/11/25  0018 03/11/25  0523   POCGLU 179* 145* 122 128       Blood Sugar Average: Last 72 hrs:  Hold home antihyperglycemics  Sliding scale insulin while in the ICU  Fingerstick blood glucose every 6 hours  Our goal will be to maintain his blood glucose between 140 and 180

## 2025-03-11 NOTE — NUTRITION
25 1431   Biochemical Data,Medical Tests, and Procedures   Biochemical Data/Medical Tests/Procedures Lab values reviewed;Meds reviewed   Labs (Comment) B-179 Na 142 K 3.8   Meds (Comment) insulin, protonix   Other Diagnostic/Procedures (Comment) extubated 3/11   Nutrition-Focused Physical Exam   Nutrition-Focused Physical Exam Findings RN skin assessment reviewed   Nutrition-Focused Physical Exam Findings redness to sacrum, no significant body fat and muscle mass depletions noted on visual exam   Medical-Related Concerns Acute on chronic hypoxemic/hypercapnic respiratory failure   Right lower lobe pneumonemia- risk factors for aspiration as well as MRSA and Pseudomonas.   Sepsis- POA due to RLL pneumonia- received IVF appropriate amount in ED   Toxic metabolic encephalopathy in setting of dementia   Severe COPD does not appear in exacerbation   Chronic thrombocytopenia Type 2 diabete   Adequacy of Intake   Nutrition Modality NPO   Feeding Route   PO NPO   Current PO Intake   Current Diet Order NPO   Estimated Calorie Intake 0-25%   Estimated Protein Intake  0-25%   Estimated Fluid Intake 0-25%   Estimated calorie intake compared to estimated need not meeting estimated needs while NPO   PES Statement   Problem Intake   Oral or Nutritional Support Intake (2) Inadequate oral intake NI-2.1   Related to Inability for po  (due to acute on chronic respiratory failure)   As evidenced by: NPO status   Recommendations/Interventions   Malnutrition/BMI Present No  (does not meet 2 criteria)   Summary Daughter and wife report pt with good appetite and PO intake PTA, no chewing/swallowing difficulties or food allergies. Pt usually has 2 meals and snacks, sometimes has Ensure. Wife denies any significant, recent wt changes   Interventions/Recommendations Diet to advance   Recommendations to Provider Advance diet to CCD2, texture per md/slp as able; Will monitor diet advancement, need for nutrition support    Education Assessment   Education Patient/caregiver not appropriate for education at this time   Patient Nutrition Goals   Goal Transition to PO diet;Nutrition via appropriate route   Goal Status Initiated   Timeframe to complete goal by next f/u   Nutrition Complexity Risk   Nutrition complexity level High risk   Follow up date 03/15/25

## 2025-03-12 ENCOUNTER — APPOINTMENT (INPATIENT)
Dept: RADIOLOGY | Facility: HOSPITAL | Age: 89
DRG: 871 | End: 2025-03-12
Payer: MEDICARE

## 2025-03-12 LAB
ANION GAP SERPL CALCULATED.3IONS-SCNC: 6 MMOL/L (ref 4–13)
ATRIAL RATE: 97 BPM
BUN SERPL-MCNC: 33 MG/DL (ref 5–25)
CA-I BLD-SCNC: 1.14 MMOL/L (ref 1.12–1.32)
CALCIUM SERPL-MCNC: 9 MG/DL (ref 8.4–10.2)
CHLORIDE SERPL-SCNC: 102 MMOL/L (ref 96–108)
CO2 SERPL-SCNC: 38 MMOL/L (ref 21–32)
CREAT SERPL-MCNC: 0.88 MG/DL (ref 0.6–1.3)
ERYTHROCYTE [DISTWIDTH] IN BLOOD BY AUTOMATED COUNT: 13.3 % (ref 11.6–15.1)
GFR SERPL CREATININE-BSD FRML MDRD: 77 ML/MIN/1.73SQ M
GLUCOSE SERPL-MCNC: 104 MG/DL (ref 65–140)
GLUCOSE SERPL-MCNC: 105 MG/DL (ref 65–140)
GLUCOSE SERPL-MCNC: 110 MG/DL (ref 65–140)
GLUCOSE SERPL-MCNC: 141 MG/DL (ref 65–140)
GLUCOSE SERPL-MCNC: 175 MG/DL (ref 65–140)
GLUCOSE SERPL-MCNC: 95 MG/DL (ref 65–140)
HCT VFR BLD AUTO: 30.6 % (ref 36.5–49.3)
HGB BLD-MCNC: 9.2 G/DL (ref 12–17)
MAGNESIUM SERPL-MCNC: 2.5 MG/DL (ref 1.9–2.7)
MCH RBC QN AUTO: 31.7 PG (ref 26.8–34.3)
MCHC RBC AUTO-ENTMCNC: 30.1 G/DL (ref 31.4–37.4)
MCV RBC AUTO: 106 FL (ref 82–98)
P AXIS: 48 DEGREES
PHOSPHATE SERPL-MCNC: 3.4 MG/DL (ref 2.3–4.1)
PLATELET # BLD AUTO: 100 THOUSANDS/UL (ref 149–390)
PMV BLD AUTO: 9.5 FL (ref 8.9–12.7)
POTASSIUM SERPL-SCNC: 4.1 MMOL/L (ref 3.5–5.3)
PR INTERVAL: 140 MS
QRS AXIS: 77 DEGREES
QRSD INTERVAL: 76 MS
QT INTERVAL: 328 MS
QTC INTERVAL: 416 MS
RBC # BLD AUTO: 2.9 MILLION/UL (ref 3.88–5.62)
SODIUM SERPL-SCNC: 146 MMOL/L (ref 135–147)
T WAVE AXIS: 60 DEGREES
VENTRICULAR RATE: 97 BPM
WBC # BLD AUTO: 9.67 THOUSAND/UL (ref 4.31–10.16)

## 2025-03-12 PROCEDURE — 74230 X-RAY XM SWLNG FUNCJ C+: CPT

## 2025-03-12 PROCEDURE — 94640 AIRWAY INHALATION TREATMENT: CPT

## 2025-03-12 PROCEDURE — 84100 ASSAY OF PHOSPHORUS: CPT

## 2025-03-12 PROCEDURE — 82330 ASSAY OF CALCIUM: CPT

## 2025-03-12 PROCEDURE — 82948 REAGENT STRIP/BLOOD GLUCOSE: CPT

## 2025-03-12 PROCEDURE — 83735 ASSAY OF MAGNESIUM: CPT

## 2025-03-12 PROCEDURE — 80048 BASIC METABOLIC PNL TOTAL CA: CPT

## 2025-03-12 PROCEDURE — 93010 ELECTROCARDIOGRAM REPORT: CPT | Performed by: INTERNAL MEDICINE

## 2025-03-12 PROCEDURE — 99233 SBSQ HOSP IP/OBS HIGH 50: CPT | Performed by: INTERNAL MEDICINE

## 2025-03-12 PROCEDURE — 92610 EVALUATE SWALLOWING FUNCTION: CPT | Performed by: SPEECH-LANGUAGE PATHOLOGIST

## 2025-03-12 PROCEDURE — 92611 MOTION FLUOROSCOPY/SWALLOW: CPT | Performed by: SPEECH-LANGUAGE PATHOLOGIST

## 2025-03-12 PROCEDURE — 85027 COMPLETE CBC AUTOMATED: CPT

## 2025-03-12 PROCEDURE — 94760 N-INVAS EAR/PLS OXIMETRY 1: CPT

## 2025-03-12 RX ORDER — INSULIN LISPRO 100 [IU]/ML
2-12 INJECTION, SOLUTION INTRAVENOUS; SUBCUTANEOUS
Status: DISCONTINUED | OUTPATIENT
Start: 2025-03-12 | End: 2025-03-15 | Stop reason: HOSPADM

## 2025-03-12 RX ORDER — DEXTROSE MONOHYDRATE 50 MG/ML
75 INJECTION, SOLUTION INTRAVENOUS CONTINUOUS
Status: DISCONTINUED | OUTPATIENT
Start: 2025-03-12 | End: 2025-03-14

## 2025-03-12 RX ORDER — LABETALOL HYDROCHLORIDE 5 MG/ML
10 INJECTION, SOLUTION INTRAVENOUS ONCE
Status: COMPLETED | OUTPATIENT
Start: 2025-03-12 | End: 2025-03-12

## 2025-03-12 RX ORDER — QUETIAPINE FUMARATE 25 MG/1
12.5 TABLET, FILM COATED ORAL ONCE
Status: COMPLETED | OUTPATIENT
Start: 2025-03-12 | End: 2025-03-12

## 2025-03-12 RX ORDER — QUETIAPINE FUMARATE 25 MG/1
12.5 TABLET, FILM COATED ORAL ONCE
Status: DISCONTINUED | OUTPATIENT
Start: 2025-03-12 | End: 2025-03-13

## 2025-03-12 RX ORDER — PANTOPRAZOLE SODIUM 40 MG/1
40 TABLET, DELAYED RELEASE ORAL
Status: DISCONTINUED | OUTPATIENT
Start: 2025-03-13 | End: 2025-03-15 | Stop reason: HOSPADM

## 2025-03-12 RX ORDER — LOSARTAN POTASSIUM 25 MG/1
25 TABLET ORAL ONCE
Status: COMPLETED | OUTPATIENT
Start: 2025-03-12 | End: 2025-03-12

## 2025-03-12 RX ORDER — HYDROCORTISONE SODIUM SUCCINATE 100 MG/2ML
50 INJECTION INTRAMUSCULAR; INTRAVENOUS EVERY 12 HOURS
Status: DISCONTINUED | OUTPATIENT
Start: 2025-03-13 | End: 2025-03-13

## 2025-03-12 RX ADMIN — CHLORHEXIDINE GLUCONATE 15 ML: 1.2 SOLUTION ORAL at 08:43

## 2025-03-12 RX ADMIN — HEPARIN SODIUM 5000 UNITS: 5000 INJECTION INTRAVENOUS; SUBCUTANEOUS at 06:04

## 2025-03-12 RX ADMIN — HYDROCORTISONE SODIUM SUCCINATE 50 MG: 100 INJECTION, POWDER, FOR SOLUTION INTRAMUSCULAR; INTRAVENOUS at 00:31

## 2025-03-12 RX ADMIN — CEFEPIME 2000 MG: 2 INJECTION, POWDER, FOR SOLUTION INTRAVENOUS at 06:04

## 2025-03-12 RX ADMIN — DEXTROSE 75 ML/HR: 5 SOLUTION INTRAVENOUS at 16:24

## 2025-03-12 RX ADMIN — PANTOPRAZOLE SODIUM 40 MG: 40 INJECTION, POWDER, FOR SOLUTION INTRAVENOUS at 08:43

## 2025-03-12 RX ADMIN — LABETALOL HYDROCHLORIDE 10 MG: 5 INJECTION, SOLUTION INTRAVENOUS at 07:43

## 2025-03-12 RX ADMIN — HYDROCORTISONE SODIUM SUCCINATE 50 MG: 100 INJECTION, POWDER, FOR SOLUTION INTRAMUSCULAR; INTRAVENOUS at 06:04

## 2025-03-12 RX ADMIN — LABETALOL HYDROCHLORIDE 10 MG: 5 INJECTION, SOLUTION INTRAVENOUS at 10:11

## 2025-03-12 RX ADMIN — HEPARIN SODIUM 5000 UNITS: 5000 INJECTION INTRAVENOUS; SUBCUTANEOUS at 14:33

## 2025-03-12 RX ADMIN — LEVALBUTEROL HYDROCHLORIDE 1.25 MG: 1.25 SOLUTION RESPIRATORY (INHALATION) at 08:04

## 2025-03-12 RX ADMIN — LOSARTAN POTASSIUM 25 MG: 25 TABLET, FILM COATED ORAL at 16:12

## 2025-03-12 RX ADMIN — CEFTRIAXONE 1000 MG: 10 INJECTION, POWDER, FOR SOLUTION INTRAVENOUS at 19:54

## 2025-03-12 RX ADMIN — LEVALBUTEROL HYDROCHLORIDE 1.25 MG: 1.25 SOLUTION RESPIRATORY (INHALATION) at 19:58

## 2025-03-12 RX ADMIN — HYDROCORTISONE SODIUM SUCCINATE 50 MG: 100 INJECTION, POWDER, FOR SOLUTION INTRAMUSCULAR; INTRAVENOUS at 14:31

## 2025-03-12 RX ADMIN — HEPARIN SODIUM 5000 UNITS: 5000 INJECTION INTRAVENOUS; SUBCUTANEOUS at 21:11

## 2025-03-12 RX ADMIN — INSULIN LISPRO 2 UNITS: 100 INJECTION, SOLUTION INTRAVENOUS; SUBCUTANEOUS at 14:33

## 2025-03-12 RX ADMIN — IPRATROPIUM BROMIDE 0.5 MG: 0.5 SOLUTION RESPIRATORY (INHALATION) at 08:04

## 2025-03-12 RX ADMIN — DEXMEDETOMIDINE HYDROCHLORIDE 0.3 MCG/KG/HR: 4 INJECTION, SOLUTION INTRAVENOUS at 00:27

## 2025-03-12 RX ADMIN — QUETIAPINE FUMARATE 12.5 MG: 25 TABLET ORAL at 18:44

## 2025-03-12 RX ADMIN — CHLORHEXIDINE GLUCONATE 15 ML: 1.2 SOLUTION ORAL at 21:11

## 2025-03-12 RX ADMIN — IPRATROPIUM BROMIDE 0.5 MG: 0.5 SOLUTION RESPIRATORY (INHALATION) at 19:58

## 2025-03-12 NOTE — PLAN OF CARE
Problem: Nutrition/Hydration-ADULT  Goal: Nutrient/Hydration intake appropriate for improving, restoring or maintaining nutritional needs  Description: Monitor and assess patient's nutrition/hydration status for malnutrition. Collaborate with interdisciplinary team and initiate plan and interventions as ordered.  Monitor patient's weight and dietary intake as ordered or per policy. Utilize nutrition screening tool and intervene as necessary. Determine patient's food preferences and provide high-protein, high-caloric foods as appropriate.     INTERVENTIONS:  - Monitor oral intake, urinary output, labs, and treatment plans  - Assess nutrition and hydration status and recommend course of action  - Evaluate amount of meals eaten  - Assist patient with eating if necessary   - Allow adequate time for meals  - Recommend/ encourage appropriate diets, oral nutritional supplements, and vitamin/mineral supplements  - Order, calculate, and assess calorie counts as needed  - Recommend, monitor, and adjust tube feedings and TPN/PPN based on assessed needs  - Assess need for intravenous fluids  - Provide specific nutrition/hydration education as appropriate  - Include patient/family/caregiver in decisions related to nutrition  Outcome: Progressing     Problem: SAFETY,RESTRAINT: NV/NON-SELF DESTRUCTIVE BEHAVIOR  Goal: Remains free of harm/injury (restraint for non violent/non self-detsructive behavior)  Description: INTERVENTIONS:  - Instruct patient/family regarding restraint use   - Assess and monitor physiologic and psychological status   - Provide interventions and comfort measures to meet assessed patient needs   - Identify and implement measures to help patient regain control  - Assess readiness for release of restraint   Outcome: Progressing     Problem: INFECTION - ADULT  Goal: Absence or prevention of progression during hospitalization  Description: INTERVENTIONS:  - Assess and monitor for signs and symptoms of  infection  - Monitor lab/diagnostic results  - Monitor all insertion sites, i.e. indwelling lines, tubes, and drains  - Monitor endotracheal if appropriate and nasal secretions for changes in amount and color  - [place] appropriate cooling/warming therapies per order  - Administer medications as ordered  - Instruct and encourage patient and family to use good hand hygiene technique  - Identify and instruct in appropriate isolation precautions for identified infection/condition  Outcome: Progressing     Problem: SAFETY ADULT  Goal: Patient will remain free of falls  Description: INTERVENTIONS:  - Educate patient/family on patient safety including physical limitations  - Instruct patient to call for assistance with activity   - Consult OT/PT to assist with strengthening/mobility   - Keep Call bell within reach  - Keep bed low and locked with side rails adjusted as appropriate  - Keep care items and personal belongings within reach  - Initiate and maintain comfort rounds  - Make Fall Risk Sign visible to staff  - Apply yellow socks and bracelet for high fall risk patients  - Consider moving patient to room near nurses station  Outcome: Progressing

## 2025-03-12 NOTE — PROCEDURES
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, psychotic disturbance, mood disturbance, or anxiety (HCC)    Hypertension    Bilateral hearing loss    Aneurysm, carotid artery, internal    Pulmonary hypertension (HCC)    Acute on chronic respiratory failure with hypoxia and hypercapnia (HCC)    Acute exacerbation of chronic obstructive pulmonary disease (COPD) (HCC)    COPD, severe (HCC)    Chronic diastolic (congestive) heart failure (HCC)    Elevated troponin    JAY treated with BiPAP    Solitary pulmonary nodule on lung CT    Encounter for support and coordination of transition of care    Type 2 diabetes mellitus with diabetic peripheral angiopathy without gangrene, without long-term current use of insulin (HCC)       Past Medical History  Past Medical History:   Diagnosis Date    Acute metabolic encephalopathy 06/13/2020    Age-related cataract of right eye 04/04/2023    patient is medically cleared and presents with low risk of complication with this upcoming R cataract surgery.    Anemia     Aneurysm, aorta, thoracic (HCC)     Appendicolith     Ascending aortic aneurysm (HCC)     3.7    Asthma     BPH (benign prostatic hyperplasia)     CAD (coronary artery disease)     noted on CT scan    CHF (congestive heart failure) (HCC)     COPD (chronic obstructive pulmonary disease) (HCC)     Delirium 04/25/2024    Descending thoracic aortic aneurysm (HCC)     Diabetes mellitus (HCC)     Diverticulosis     Former tobacco use     GERD (gastroesophageal reflux disease)     History of DVT (deep vein thrombosis)     Left leg    History of transfusion      Hypertension     Hypoxemia 04/30/2023    Inguinal hernia     right    Nephrolithiasis     Oxygen dependent     2LNC    Oxygen dependent     Pneumonia     Pre-diabetes     Prostate calculus     PVD (peripheral vascular disease) ([facility])     Recurrent right inguinal hernia w incarcertion 01/04/2023    SIRS (systemic inflammatory response syndrome) ([facility]) 09/04/2024    Solitary pulmonary nodule on lung CT 02/02/2025    8 x 5 mm right middle lobe nodule, most recently 5 x 3 mm (3/163) and about 4 x 2 mm in January 2024.       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[facility])     Varicose vein of leg     b/l       Past Surgical History  Past Surgical History:   Procedure Laterality Date    CARDIAC SURGERY      ESOPHAGOGASTRODUODENOSCOPY      HERNIA REPAIR Right 1/21/2019    Procedure: REPAIR HERNIA INGUINAL WITH MESH;  Surgeon: David Lowry MD;  Location:  MAIN OR;  Service: General    HERNIA REPAIR Right 7/22/2023    Procedure: REPAIR RECURRENT INCARERATED HERNIA INGUINAL OPEN, RIGHT ORCHIECTOMY;  Surgeon: Mason Bertrand MD;  Location: [facility];  Service: General    INGUINAL HERNIA REPAIR Bilateral     IR TEVAR  12/27/2018    CA EVASC RPR DTA COVERAGE ART ORIGIN 1ST ENDOPROSTH N/A 12/27/2018    Procedure: TEVAR - endovascular thoracic aortic aneurysm repair;  Surgeon: Gladis Ortega MD;  Location: [facility];  Service: Vascular    THORACIC AORTIC ANEURYSM REPAIR  12/27/2018    VARICOSE VEIN SURGERY Bilateral     vein stripping         General Information;   Pt is a 89 y.o. male who was seen for a BSE, with recommendations for VBSS to further assess the swallow.    Previous VBS: none    Consistencies Assessed and Performance   Pt was seen in radiology for a Video Barium Swallow Study, seated in the upright position and viewed laterally with the  following consistencies:     Administered: thin liquids, nectar thick, puree, and hard solids  Specific materials administered included    Consistencies administered: Barium laden applesauce, cookie, nectar thick, thin liquids, 13mm barium pill. Liquids were administered by cup and straw.    Results are as follows:     **Images are available for review on PACS    Oral stage:  Lip closure: adequate  Mastication: prolonged but adequate  Bolus formation: adequate  Bolus control: min reduced  Transfer: adequate  Residue:  min-none    Pharyngeal stage:  Swallow promptness: prompt  Spill to valleculae: present across liquids and solids  Spill to pyriforms: with thins and at times with nectar ( to laryngeal inlet)  Epiglottic inversion: incomplete - abuts PPW at times  Laryngeal rise: adequate  Pharyngeal constriction: adequate  Vallecular retention: -  Pyriform retention: -  PPW coating: -  Osteophytes:-  CP prominence: not observed ( limited visualization)  Retropulsion from prominence: -  Transient penetration: observed with thin liquids and nectar thick  Epiglottic undercoat: -  Penetration: observed with thin and nectar thick  Aspiration: observed with larger/consecutive sips of thin    Screening of Esophageal stage:  Brief screening after solids revealed a clear esophagus. This is not a formal assessment of the esophagus and limited to small amounts in the upright position.    Penetration/Aspiration:  Thin: PAS - 2  ( 8)  Nectar: PAS- 2  Honey: PAS-DNT  Puree: PAS- 1  Solid: PAS- 1  Response to Aspiration: none- aspiration was silent   Strategies/Efficacy: chin tuck significantly reduced penetration/ eliminated aspiration           8-Point Penetration-Aspiration Scale   1 Material does not enter the airway   2 Material enters the airway, remains above the vocal folds, and is ejected  from the  airway    3 Material enters the airway, remains above the vocal folds, and is not ejected from the airway   4 Material enters  the airway, contacts the vocal folds, and is ejected from the airway   5 Material enters the airway, contacts the vocal folds, and is not ejected from the airway    6 Material enters the airway, passes below the vocal folds and is ejected into the larynx or out of the airway    7 Material enters the airway, passes below the vocal folds, and is not ejected from the trachea despite effort    8 Material enters the airway, passes below the vocal folds, and no effort is made to eject            Assessment Summary;  Pt presents with mild oropharyngeal dysphagia characterized by reduced bolus control posteriorly and airway protection/closure with penetration/silent aspiration. He did appear to benefit from use of chin tuck however I am unsure if he will be able to carry this strategy over in a functional capacity.     Recommendations;  Recommend regular diet and nectar thick liquids, with upright posture, slow rate of feeding, small bites/sips, and chin tuck.   Recommended Form of Meds:  with puree or NTL        Results reviewed with patient, RN, and family.      Goals:  Pt will tolerate least restrictive diet w/out s/s aspiration or oral/pharyngeal difficulties.    Dysphagia Goals: pt will tolerate regular textures with thin liquids without s/s of aspiration x3 and pt will demonstrate accurate use of chin tuck strategy with 80% accuracy given no cues.    Will f/u      Sue Martinez M.S., CCC-SLP  Speech Language Pathologist   Available via Secure Chat  [place] #[id number]  [place] #[id number]

## 2025-03-12 NOTE — SPEECH THERAPY NOTE
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, psychotic disturbance, mood disturbance, or anxiety (HCC)    Hypertension    Bilateral hearing loss    Aneurysm, carotid artery, internal    Pulmonary hypertension (HCC)    Acute on chronic respiratory failure with hypoxia and hypercapnia (HCC)    Acute exacerbation of chronic obstructive pulmonary disease (COPD) (HCC)    COPD, severe (HCC)    Chronic diastolic (congestive) heart failure (HCC)    Elevated troponin    JAY treated with BiPAP    Solitary pulmonary nodule on lung CT    Encounter for support and coordination of transition of care    Type 2 diabetes mellitus with diabetic peripheral angiopathy without gangrene, without long-term current use of insulin (HCC)       Past Medical History  Past Medical History:   Diagnosis Date    Acute metabolic encephalopathy 06/13/2020    Age-related cataract of right eye 04/04/2023    patient is medically cleared and presents with low risk of complication with this upcoming R cataract surgery.    Anemia     Aneurysm, aorta, thoracic (HCC)     Appendicolith     Ascending aortic aneurysm (HCC)     3.7    Asthma     BPH (benign prostatic hyperplasia)     CAD (coronary artery disease)     noted on CT scan    CHF (congestive heart failure) (HCC)     COPD (chronic obstructive pulmonary disease) (HCC)     Delirium 04/25/2024    Descending thoracic aortic aneurysm (HCC)     Diabetes mellitus (HCC)     Diverticulosis     Former tobacco use     GERD (gastroesophageal reflux disease)     History of DVT (deep vein thrombosis)     Left leg    History of transfusion     Hypertension     Hypoxemia  04/30/2023    Inguinal hernia     right    Nephrolithiasis     Oxygen dependent     2LNC    Oxygen dependent     Pneumonia     Pre-diabetes     Prostate calculus     PVD (peripheral vascular disease) ([facility])     Recurrent right inguinal hernia w incarcertion 01/04/2023    SIRS (systemic inflammatory response syndrome) ([facility]) 09/04/2024    Solitary pulmonary nodule on lung CT 02/02/2025    8 x 5 mm right middle lobe nodule, most recently 5 x 3 mm (3/163) and about 4 x 2 mm in January 2024.       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[facility])     Varicose vein of leg     b/l       Past Surgical History  Past Surgical History:   Procedure Laterality Date    CARDIAC SURGERY      ESOPHAGOGASTRODUODENOSCOPY      HERNIA REPAIR Right 1/21/2019    Procedure: REPAIR HERNIA INGUINAL WITH MESH;  Surgeon: David Lowry MD;  Location:  MAIN OR;  Service: General    HERNIA REPAIR Right 7/22/2023    Procedure: REPAIR RECURRENT INCARERATED HERNIA INGUINAL OPEN, RIGHT ORCHIECTOMY;  Surgeon: Mason Bertrand MD;  Location[facility];  Service: General    INGUINAL HERNIA REPAIR Bilateral     IR TEVAR  12/27/2018    ND EVASC RPR DTA COVERAGE ART ORIGIN 1ST ENDOPROSTH N/A 12/27/2018    Procedure: TEVAR - endovascular thoracic aortic aneurysm repair;  Surgeon: Gladis Ortega MD;  [facility];  Service: Vascular    THORACIC AORTIC ANEURYSM REPAIR  12/27/2018    VARICOSE VEIN SURGERY Bilateral     vein stripping         Current Medical Status  Pt is a 89 y.o. male who presented [facility] on 3/[facility] with ARF requiring intubation. S/p bronchoscopy with noted mod secretions. Subsequently extubated 3/10. SLP consult requested to evaluate the swallow.     Patient is known to this department from prior admissions. He has a hx of known esophageal diverticulum  on esophagram in 2020. Had reported frequent vomiting during previous admissions. There was concern for retrograde aspiration risk.     Today the patient and his wife report minimal further vomiting episodes ( only in the morning sometimes +assoc with nausea). He denies any globus sensation, changes in voice or restrictions to what he eats. His wife reports good appetite/intake- takes meds whole. Both the wife and son ( via TC call at the end of my visit) report multiple recurrent PNAs every year over the last 3 years. No weight loss reported.       Past medical history:   Please see H&P for details    Special Studies:  3/11 CXR: Congestive changes with right lower lobe infiltrate and bilateral small effusions more on the right     6/16/2020: No significant interval change. Stable esophageal diverticulum at the level of the thoracic inlet.  Remainder of the esophagus is otherwise normal in caliber.    Social/Education/Vocational Hx:  Pt lives with family      Swallow Information   Current Risks for Dysphagia & Aspiration: known history of dysphagia and change in respiratory status     Current Symptoms/Concerns: change in respiratory status    Current Diet: regular diet and thin liquids      Baseline Diet: regular diet and thin liquids      Baseline Assessment   Behavior/Cognition: alert + confused but wife is present to redirect    Speech/Language Status: able to participate in basic conversation and able to follow commands (simple commands)    Patient Positioning: upright in recliner      Swallow Mechanism Exam   Facial: symmetrical  Labial: WFL  Lingual: WFL  Velum: symmetrical  Mandible: adequate ROM  Dentition: edentulous  Vocal quality:dysphonic (reported to be baseline)  Volitional Cough:  fair+    Resp: 6L NC    Consistencies Assessed and Performance   Consistencies Administered: thin liquids, puree, soft solids, and hard solids  Specific materials administered included: applesauce ( 4oz), viviana crackers x2,  turkey sandwich, 4 oz thin liquids    Oral Stage:   Mastication was prolonged ( as expected) but eventually adequate with the materials administered today. Bolus formation and transfer were slow but functional appearing with no significant oral residue noted. Piecemeal transfer was noted. No overt s/s reduced oral control.    Pharyngeal Stage:   Swallowing initiation appeared prompt. Laryngeal rise was palpated and judged to be within functional limits.  No coughing, throat clearing, change in vocal quality or respiratory status noted today.     Esophageal Concerns:  known prior esophageal diverticulum    Summary   Pts oropharyngeal swallow function appears generally WFL at this time with the materials administered today - suspect known esophageal dysphagia with retrograde aspiration risk. Although cannot r/o silent aspiration given hx of recurrent PNAs and dysphonia. Consider VBSS to further assess.    Recommendations: pending VBSS    Plan  VBSS to follow    Sue Martinez M.S., CCC-SLP  Speech Language Pathologist   Available via Secure Chat  [place] #[id number]  [place] #[id number]

## 2025-03-12 NOTE — PROGRESS NOTES
Progress Note - Critical Care/ICU   Name: Severiano Feliciano 89 y.o. male I MRN: [id number]  Unit/Bed#: ICU 03 I Date of Admission: 3/9/2025   Date of Service: 3/12/2025 I Hospital Day: 3       Assessment & Plan  Acute exacerbation of chronic obstructive pulmonary disease (COPD) ([facility])  Patient presented to [facility] with complaints of shortness of breath  Patient was hypoxic and required intubation and mechanical ventilation  Transferred to [facility] intensive care unit   x-ray demonstrates right lower lobe infiltrate possibly representing pneumonia    Plan:  Monitor white count  Continue antibiotics as ordered  Consider IV Solu-Medrol  Monitor fever curve  Updraft nebulizer treatments as needed  Continue bronchodilators  Dysphagia  Post extubation swallow eval  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, psychotic disturbance, mood disturbance, or anxiety ([facility])  Redirect patient as needed  Sedation while intubated  Hypertension  Treat hypertension as needed  Off vasopressors for two days  Maintain MAP of 65  Cozaar 25 mg daily   Labetalol 10mg IV once   Bilateral hearing loss  Maintain hearing aids if in patient's possession  Aneurysm, carotid artery, internal  Status post stenting  Monitor blood pressure, treat hypertension as needed  Continue Cozaar with hold parameters   Chronic diastolic (congestive) heart failure ([facility])  Wt Readings from Last 3 Encounters:   03/12/25 66.5 kg (146 lb 9.7 oz)   02/13/25 59.9 kg (132 lb)   02/02/25 60.3 kg (132 lb 15 oz)     Daily weights  Strict I's and O's  Diuretics as needed          JAY treated with BiPAP    BIPAP when extubated qHS and prn  Type 2 diabetes mellitus with diabetic peripheral angiopathy without gangrene, without long-term current use of insulin ([facility])  Lab Results   Component Value Date    HGBA1C 6.1 (H) 03/09/2025       Recent Labs     03/11/25  1321 03/11/25  1642 03/12/25  0037 03/12/25  0607   POCGLU 111 93  95 105       Blood Sugar Average: Last 72 hrs:  Hold home antihyperglycemics  Sliding scale insulin while in the ICU  Fingerstick blood glucose every 6 hours  Our goal will be to maintain his blood glucose between 140 and 180    Disposition: Critical care    ICU Core Measures     A: Assess, Prevent, and Manage Pain Has pain been assessed? Yes  Need for changes to pain regimen? No   B: Both SAT/SAT  N/A   C: Choice of Sedation RASS Goal: 0 Alert and Calm  Need for changes to sedation or analgesia regimen? No   D: Delirium CAM-ICU: Unable to perform secondary to Acute cognitive dysfunction   E: Early Mobility  Plan for early mobility? Yes   F: Family Engagement Plan for family engagement today? Yes       Antibiotic Review: Patient on appropriate coverage based on culture data.     Review of Invasive Devices:    [place] Plan: Continue for accurate I/O monitoring for 48 hours        Prophylaxis:  VTE VTE covered by:  heparin (porcine), Subcutaneous, 5,000 Units at 03/12/25 0604       Stress Ulcer  covered bypantoprazole (PROTONIX) 40 mg tablet [[id number]] (Long-Term Med), pantoprazole (PROTONIX) injection 40 mg [[id number]]         24 Hour Events : Last night ripped off NC and dipped down to 27%, improved on NRB. Patient placed on mitts and precedex, slept a couple of hours. Received melatonin to help with sleep.   Subjective   Review of Systems: Review of Systems not obtainable due to Altered mental status    Objective :                   Vitals I/O      Most Recent Min/Max in 24hrs   Temp (!) 97.2 °F (36.2 °C) Temp  Min: 97.2 °F (36.2 °C)  Max: 100 °F (37.8 °C)   Pulse 56 Pulse  Min: 56  Max: 111   Resp (!) 23 Resp  Min: 20  Max: 37   BP (!) 180/85 BP  Min: 140/68  Max: 197/96   O2 Sat 100 % SpO2  Min: 88 %  Max: 100 %      Intake/Output Summary (Last 24 hours) at 3/12/2025 0740  Last data filed at 3/12/2025 0701  Gross per 24 hour   Intake 123.58 ml   Output 980 ml   Net -856.42 ml       Diet NPO    Invasive Monitoring            Physical Exam   Physical Exam  Eyes:      General: No scleral icterus.     Extraocular Movements: Extraocular movements intact.      Pupils: Pupils are equal, round, and reactive to light.   Skin:     General: Skin is warm.      Capillary Refill: Capillary refill takes less than 2 seconds.   HENT:      Head: Normocephalic and atraumatic.      Mouth/Throat:      Mouth: Mucous membranes are moist.   Cardiovascular:      Rate and Rhythm: Normal rate and regular rhythm.      Pulses: Normal pulses.      Heart sounds: Normal heart sounds.   Musculoskeletal:         General: No swelling.      Right lower leg: No edema.      Left lower leg: No edema.   Abdominal: General: There is no distension.      Palpations: Abdomen is soft.      Tenderness: There is no abdominal tenderness. There is no guarding.   Constitutional:       Appearance: He is ill-appearing.   Pulmonary:      Effort: No respiratory distress.      Comments: CTA.   Diminished BS.   On NC  Neurological:      Mental Status: He is somnolent.      Comments: Arousable to sternal rub     Genitourinary/Anorectal:  Hardin present.        Diagnostic Studies        Lab Results: I have reviewed the following results:     Medications:  Scheduled PRN   cefepime, 2,000 mg, Q12H  chlorhexidine, 15 mL, Q12H KIKE  heparin (porcine), 5,000 Units, Q8H KIKE  hydrocortisone sodium succinate, 50 mg, Q6H KIKE  insulin lispro, 2-12 Units, Q6H KIKE  ipratropium, 0.5 mg, TID  labetalol, 10 mg, Once  levalbuterol, 1.25 mg, TID  losartan, 25 mg, Daily  melatonin, 6 mg, HS  pantoprazole, 40 mg, Q24H KIKE      acetaminophen, 975 mg, Q6H PRN       Continuous    dexmedetomidine, 0.1-0.7 mcg/kg/hr, Last Rate: Stopped (03/12/25 0648)         Labs:   CBC    Recent Labs     03/11/25  0515 03/12/25  0637   WBC 10.69* 9.67   HGB 8.6* 9.2*   HCT 28.8* 30.6*   * 100*     BMP    Recent Labs     03/11/25  0515 03/12/25  0550   SODIUM 142 146   K 3.8 4.1    102   CO2 38* 38*   AGAP 3* 6    BUN 31* 33*   CREATININE 0.80 0.88   CALCIUM 8.7 9.0       Coags    No recent results     Additional Electrolytes  Recent Labs     03/11/25  0515 03/12/25  0550   MG 2.6 2.5   PHOS  --  3.4   CAIONIZED  --  1.14          Blood Gas    Recent Labs     03/11/25  0642   PHART 7.426   OBK5ZRF 54.9*   PO2ART 88.0   [id number] 35.3*   BEART 9.5   SOURCE Line, Arterial     Recent Labs     03/11/25  0642   SOURCE Line, Arterial    LFTs  No recent results    Infectious  No recent results  Glucose  Recent Labs     03/11/25  0515 03/12/25  0550   GLUC 134 104

## 2025-03-12 NOTE — ASSESSMENT & PLAN NOTE
Patient presented to [facility] with complaints of shortness of breath  Patient was hypoxic and required intubation and mechanical ventilation  Transferred to [facility] intensive care unit   x-ray demonstrates right lower lobe infiltrate possibly representing pneumonia    Plan:  Monitor white count  Continue antibiotics as ordered  Consider IV Solu-Medrol  Monitor fever curve  Updraft nebulizer treatments as needed  Continue bronchodilators

## 2025-03-12 NOTE — ASSESSMENT & PLAN NOTE
Lab Results   Component Value Date    HGBA1C 6.1 (H) 03/09/2025       Recent Labs     03/11/25  1321 03/11/25  1642 03/12/25  0037 03/12/25  0607   POCGLU 111 93 95 105       Blood Sugar Average: Last 72 hrs:  Hold home antihyperglycemics  Sliding scale insulin while in the ICU  Fingerstick blood glucose every 6 hours  Our goal will be to maintain his blood glucose between 140 and 180

## 2025-03-12 NOTE — ASSESSMENT & PLAN NOTE
Wt Readings from Last 3 Encounters:   03/12/25 66.5 kg (146 lb 9.7 oz)   02/13/25 59.9 kg (132 lb)   02/02/25 60.3 kg (132 lb 15 oz)     Daily weights  Strict I's and O's  Diuretics as needed

## 2025-03-12 NOTE — PLAN OF CARE
Problem: PAIN - ADULT  Goal: Verbalizes/displays adequate comfort level or baseline comfort level  Description: Interventions:  - Encourage patient to monitor pain and request assistance  - Assess pain using appropriate pain scale  - Administer analgesics based on type and severity of pain and evaluate response  - Implement non-pharmacological measures as appropriate and evaluate response  - Consider cultural and social influences on pain and pain management  - Notify physician/advanced practitioner if interventions unsuccessful or patient reports new pain  Outcome: Progressing     Problem: SAFETY,RESTRAINT: NV/NON-SELF DESTRUCTIVE BEHAVIOR  Goal: Remains free of harm/injury (restraint for non violent/non self-detsructive behavior)  Description: INTERVENTIONS:  - Instruct patient/family regarding restraint use   - Assess and monitor physiologic and psychological status   - Provide interventions and comfort measures to meet assessed patient needs   - Identify and implement measures to help patient regain control  - Assess readiness for release of restraint   Outcome: Progressing  Goal: Returns to optimal restraint-free functioning  Description: INTERVENTIONS:  - Assess the patient's behavior and symptoms that indicate continued need for restraint  - Identify and implement measures to help patient regain control  - Assess readiness for release of restraint   Outcome: Progressing     Problem: INFECTION - ADULT  Goal: Absence or prevention of progression during hospitalization  Description: INTERVENTIONS:  - Assess and monitor for signs and symptoms of infection  - Monitor lab/diagnostic results  - Monitor all insertion sites, i.e. indwelling lines, tubes, and drains  - Monitor endotracheal if appropriate and nasal secretions for changes in amount and color  - [place] appropriate cooling/warming therapies per order  - Administer medications as ordered  - Instruct and encourage patient and family to use good hand  hygiene technique  - Identify and instruct in appropriate isolation precautions for identified infection/condition  Outcome: Progressing  Goal: Absence of fever/infection during neutropenic period  Description: INTERVENTIONS:  - Monitor WBC    Outcome: Progressing     Problem: SAFETY ADULT  Goal: Patient will remain free of falls  Description: INTERVENTIONS:  - Educate patient/family on patient safety including physical limitations  - Instruct patient to call for assistance with activity   - Consult OT/PT to assist with strengthening/mobility   - Keep Call bell within reach  - Keep bed low and locked with side rails adjusted as appropriate  - Keep care items and personal belongings within reach  - Initiate and maintain comfort rounds  - Make Fall Risk Sign visible to staff  - Offer Toileting in advance of need  - Initiate/Maintain bed alarm  - Obtain necessary fall risk management equipment  - Apply yellow socks and bracelet for high fall risk patients  - Consider moving patient to room near nurses station  Outcome: Progressing     Problem: Nutrition/Hydration-ADULT  Goal: Nutrient/Hydration intake appropriate for improving, restoring or maintaining nutritional needs  Description: Monitor and assess patient's nutrition/hydration status for malnutrition. Collaborate with interdisciplinary team and initiate plan and interventions as ordered.  Monitor patient's weight and dietary intake as ordered or per policy. Utilize nutrition screening tool and intervene as necessary. Determine patient's food preferences and provide high-protein, high-caloric foods as appropriate.     INTERVENTIONS:  - Monitor oral intake, urinary output, labs, and treatment plans  - Assess nutrition and hydration status and recommend course of action  - Evaluate amount of meals eaten  - Assist patient with eating if necessary   - Allow adequate time for meals  - Recommend/ encourage appropriate diets, oral nutritional supplements, and  vitamin/mineral supplements  - Order, calculate, and assess calorie counts as needed  - Recommend, monitor, and adjust tube feedings and TPN/PPN based on assessed needs  - Assess need for intravenous fluids  - Provide specific nutrition/hydration education as appropriate  - Include patient/family/caregiver in decisions related to nutrition  Outcome: Not Progressing     Problem: SAFETY ADULT  Goal: Maintain or return to baseline ADL function  Description: INTERVENTIONS:  -  Assess patient's ability to carry out ADLs; assess patient's baseline for ADL function and identify physical deficits which impact ability to perform ADLs (bathing, care of mouth/teeth, toileting, grooming, dressing, etc.)  - Assess/evaluate cause of self-care deficits   - Assess range of motion  - Assess patient's mobility; develop plan if impaired  - Assess patient's need for assistive devices and provide as appropriate  - Encourage maximum independence but intervene and supervise when necessary  - Involve family in performance of ADLs  - Assess for home care needs following discharge   - Consider OT consult to assist with ADL evaluation and planning for discharge  - Provide patient education as appropriate  Outcome: Not Progressing  Goal: Maintains/Returns to pre admission functional level  Description: INTERVENTIONS:  - Perform AM-PAC 6 Click Basic Mobility/ Daily Activity assessment daily.  - Set and communicate daily mobility goal to care team and patient/family/caregiver.   - Out of bed for toileting  - Record patient progress and toleration of activity level   Outcome: Not Progressing     Problem: DISCHARGE PLANNING  Goal: Discharge to home or other facility with appropriate resources  Description: INTERVENTIONS:  - Identify barriers to discharge w/patient and caregiver  - Arrange for needed discharge resources and transportation as appropriate  - Identify discharge learning needs (meds, wound care, etc.)  - Arrange for interpretive  services to assist at discharge as needed  - Refer to Case Management Department for coordinating discharge planning if the patient needs post-hospital services based on physician/advanced practitioner order or complex needs related to functional status, cognitive ability, or social support system  Outcome: Not Progressing     Problem: Knowledge Deficit  Goal: Patient/family/caregiver demonstrates understanding of disease process, treatment plan, medications, and discharge instructions  Description: Complete learning assessment and assess knowledge base.  Interventions:  - Provide teaching at level of understanding  - Provide teaching via preferred learning methods  Outcome: Not Progressing

## 2025-03-12 NOTE — ASSESSMENT & PLAN NOTE
Treat hypertension as needed  Off vasopressors for two days  Maintain MAP of 65  Cozaar 25 mg daily   Labetalol 10mg IV once

## 2025-03-13 LAB
ANION GAP SERPL CALCULATED.3IONS-SCNC: 6 MMOL/L (ref 4–13)
BASOPHILS # BLD AUTO: 0.01 THOUSANDS/ÂΜL (ref 0–0.1)
BASOPHILS NFR BLD AUTO: 0 % (ref 0–1)
BUN SERPL-MCNC: 32 MG/DL (ref 5–25)
CA-I BLD-SCNC: 1.15 MMOL/L (ref 1.12–1.32)
CALCIUM SERPL-MCNC: 9 MG/DL (ref 8.4–10.2)
CHLORIDE SERPL-SCNC: 101 MMOL/L (ref 96–108)
CO2 SERPL-SCNC: 39 MMOL/L (ref 21–32)
CREAT SERPL-MCNC: 0.77 MG/DL (ref 0.6–1.3)
EOSINOPHIL # BLD AUTO: 0 THOUSAND/ÂΜL (ref 0–0.61)
EOSINOPHIL NFR BLD AUTO: 0 % (ref 0–6)
ERYTHROCYTE [DISTWIDTH] IN BLOOD BY AUTOMATED COUNT: 13.2 % (ref 11.6–15.1)
GFR SERPL CREATININE-BSD FRML MDRD: 81 ML/MIN/1.73SQ M
GLUCOSE SERPL-MCNC: 116 MG/DL (ref 65–140)
GLUCOSE SERPL-MCNC: 118 MG/DL (ref 65–140)
GLUCOSE SERPL-MCNC: 127 MG/DL (ref 65–140)
GLUCOSE SERPL-MCNC: 141 MG/DL (ref 65–140)
GLUCOSE SERPL-MCNC: 155 MG/DL (ref 65–140)
GLUCOSE SERPL-MCNC: 162 MG/DL (ref 65–140)
HCT VFR BLD AUTO: 35.1 % (ref 36.5–49.3)
HGB BLD-MCNC: 10.6 G/DL (ref 12–17)
IMM GRANULOCYTES # BLD AUTO: 0.06 THOUSAND/UL (ref 0–0.2)
IMM GRANULOCYTES NFR BLD AUTO: 1 % (ref 0–2)
LYMPHOCYTES # BLD AUTO: 0.73 THOUSANDS/ÂΜL (ref 0.6–4.47)
LYMPHOCYTES NFR BLD AUTO: 8 % (ref 14–44)
MAGNESIUM SERPL-MCNC: 2.1 MG/DL (ref 1.9–2.7)
MCH RBC QN AUTO: 32.1 PG (ref 26.8–34.3)
MCHC RBC AUTO-ENTMCNC: 30.2 G/DL (ref 31.4–37.4)
MCV RBC AUTO: 106 FL (ref 82–98)
MONOCYTES # BLD AUTO: 0.93 THOUSAND/ÂΜL (ref 0.17–1.22)
MONOCYTES NFR BLD AUTO: 10 % (ref 4–12)
NEUTROPHILS # BLD AUTO: 7.81 THOUSANDS/ÂΜL (ref 1.85–7.62)
NEUTS SEG NFR BLD AUTO: 81 % (ref 43–75)
NRBC BLD AUTO-RTO: 0 /100 WBCS
PHOSPHATE SERPL-MCNC: 2.3 MG/DL (ref 2.3–4.1)
PLATELET # BLD AUTO: 139 THOUSANDS/UL (ref 149–390)
PMV BLD AUTO: 9.9 FL (ref 8.9–12.7)
POTASSIUM SERPL-SCNC: 3.4 MMOL/L (ref 3.5–5.3)
RBC # BLD AUTO: 3.3 MILLION/UL (ref 3.88–5.62)
SODIUM SERPL-SCNC: 146 MMOL/L (ref 135–147)
WBC # BLD AUTO: 9.54 THOUSAND/UL (ref 4.31–10.16)

## 2025-03-13 PROCEDURE — 92526 ORAL FUNCTION THERAPY: CPT

## 2025-03-13 PROCEDURE — 94640 AIRWAY INHALATION TREATMENT: CPT

## 2025-03-13 PROCEDURE — 82330 ASSAY OF CALCIUM: CPT

## 2025-03-13 PROCEDURE — 83735 ASSAY OF MAGNESIUM: CPT

## 2025-03-13 PROCEDURE — 84100 ASSAY OF PHOSPHORUS: CPT

## 2025-03-13 PROCEDURE — 99232 SBSQ HOSP IP/OBS MODERATE 35: CPT | Performed by: INTERNAL MEDICINE

## 2025-03-13 PROCEDURE — 80048 BASIC METABOLIC PNL TOTAL CA: CPT

## 2025-03-13 PROCEDURE — 94760 N-INVAS EAR/PLS OXIMETRY 1: CPT

## 2025-03-13 PROCEDURE — 82948 REAGENT STRIP/BLOOD GLUCOSE: CPT

## 2025-03-13 PROCEDURE — 85025 COMPLETE CBC W/AUTO DIFF WBC: CPT

## 2025-03-13 RX ORDER — LABETALOL HYDROCHLORIDE 5 MG/ML
10 INJECTION, SOLUTION INTRAVENOUS ONCE
Status: COMPLETED | OUTPATIENT
Start: 2025-03-13 | End: 2025-03-13

## 2025-03-13 RX ORDER — LOSARTAN POTASSIUM 50 MG/1
50 TABLET ORAL DAILY
Status: DISCONTINUED | OUTPATIENT
Start: 2025-03-14 | End: 2025-03-15 | Stop reason: HOSPADM

## 2025-03-13 RX ORDER — POTASSIUM CHLORIDE 14.9 MG/ML
20 INJECTION INTRAVENOUS ONCE
Status: COMPLETED | OUTPATIENT
Start: 2025-03-13 | End: 2025-03-13

## 2025-03-13 RX ADMIN — LABETALOL HYDROCHLORIDE 10 MG: 5 INJECTION, SOLUTION INTRAVENOUS at 07:47

## 2025-03-13 RX ADMIN — INSULIN LISPRO 2 UNITS: 100 INJECTION, SOLUTION INTRAVENOUS; SUBCUTANEOUS at 18:22

## 2025-03-13 RX ADMIN — CHLORHEXIDINE GLUCONATE 15 ML: 1.2 SOLUTION ORAL at 07:47

## 2025-03-13 RX ADMIN — MELATONIN 6 MG: 3 TAB ORAL at 21:54

## 2025-03-13 RX ADMIN — IPRATROPIUM BROMIDE 0.5 MG: 0.5 SOLUTION RESPIRATORY (INHALATION) at 13:22

## 2025-03-13 RX ADMIN — LABETALOL HYDROCHLORIDE 10 MG: 5 INJECTION, SOLUTION INTRAVENOUS at 03:45

## 2025-03-13 RX ADMIN — LEVALBUTEROL HYDROCHLORIDE 1.25 MG: 1.25 SOLUTION RESPIRATORY (INHALATION) at 07:25

## 2025-03-13 RX ADMIN — POTASSIUM CHLORIDE 20 MEQ: 14.9 INJECTION, SOLUTION INTRAVENOUS at 07:51

## 2025-03-13 RX ADMIN — CEFTRIAXONE 1000 MG: 10 INJECTION, POWDER, FOR SOLUTION INTRAVENOUS at 18:52

## 2025-03-13 RX ADMIN — IPRATROPIUM BROMIDE 0.5 MG: 0.5 SOLUTION RESPIRATORY (INHALATION) at 19:05

## 2025-03-13 RX ADMIN — INSULIN LISPRO 2 UNITS: 100 INJECTION, SOLUTION INTRAVENOUS; SUBCUTANEOUS at 12:27

## 2025-03-13 RX ADMIN — LEVALBUTEROL HYDROCHLORIDE 1.25 MG: 1.25 SOLUTION RESPIRATORY (INHALATION) at 19:05

## 2025-03-13 RX ADMIN — DEXTROSE 75 ML/HR: 5 SOLUTION INTRAVENOUS at 21:54

## 2025-03-13 RX ADMIN — LEVALBUTEROL HYDROCHLORIDE 1.25 MG: 1.25 SOLUTION RESPIRATORY (INHALATION) at 13:22

## 2025-03-13 RX ADMIN — HYDROCORTISONE SODIUM SUCCINATE 50 MG: 100 INJECTION, POWDER, FOR SOLUTION INTRAMUSCULAR; INTRAVENOUS at 00:10

## 2025-03-13 RX ADMIN — LOSARTAN POTASSIUM 25 MG: 25 TABLET, FILM COATED ORAL at 08:59

## 2025-03-13 RX ADMIN — IPRATROPIUM BROMIDE 0.5 MG: 0.5 SOLUTION RESPIRATORY (INHALATION) at 07:25

## 2025-03-13 RX ADMIN — HEPARIN SODIUM 5000 UNITS: 5000 INJECTION INTRAVENOUS; SUBCUTANEOUS at 05:53

## 2025-03-13 NOTE — PLAN OF CARE
Problem: PAIN - ADULT  Goal: Verbalizes/displays adequate comfort level or baseline comfort level  Description: Interventions:  - Encourage patient to monitor pain and request assistance  - Assess pain using appropriate pain scale  - Administer analgesics based on type and severity of pain and evaluate response  - Implement non-pharmacological measures as appropriate and evaluate response  - Consider cultural and social influences on pain and pain management  - Notify physician/advanced practitioner if interventions unsuccessful or patient reports new pain  Outcome: Progressing     Problem: Nutrition/Hydration-ADULT  Goal: Nutrient/Hydration intake appropriate for improving, restoring or maintaining nutritional needs  Description: Monitor and assess patient's nutrition/hydration status for malnutrition. Collaborate with interdisciplinary team and initiate plan and interventions as ordered.  Monitor patient's weight and dietary intake as ordered or per policy. Utilize nutrition screening tool and intervene as necessary. Determine patient's food preferences and provide high-protein, high-caloric foods as appropriate.     INTERVENTIONS:  - Monitor oral intake, urinary output, labs, and treatment plans  - Assess nutrition and hydration status and recommend course of action  - Evaluate amount of meals eaten  - Assist patient with eating if necessary   - Allow adequate time for meals  - Recommend/ encourage appropriate diets, oral nutritional supplements, and vitamin/mineral supplements  - Order, calculate, and assess calorie counts as needed  - Recommend, monitor, and adjust tube feedings and TPN/PPN based on assessed needs  - Assess need for intravenous fluids  - Provide specific nutrition/hydration education as appropriate  - Include patient/family/caregiver in decisions related to nutrition  Outcome: Progressing     Problem: SAFETY,RESTRAINT: NV/NON-SELF DESTRUCTIVE BEHAVIOR  Goal: Remains free of harm/injury  (restraint for non violent/non self-detsructive behavior)  Description: INTERVENTIONS:  - Instruct patient/family regarding restraint use   - Assess and monitor physiologic and psychological status   - Provide interventions and comfort measures to meet assessed patient needs   - Identify and implement measures to help patient regain control  - Assess readiness for release of restraint   Outcome: Progressing  Goal: Returns to optimal restraint-free functioning  Description: INTERVENTIONS:  - Assess the patient's behavior and symptoms that indicate continued need for restraint  - Identify and implement measures to help patient regain control  - Assess readiness for release of restraint   Outcome: Progressing     Problem: INFECTION - ADULT  Goal: Absence or prevention of progression during hospitalization  Description: INTERVENTIONS:  - Assess and monitor for signs and symptoms of infection  - Monitor lab/diagnostic results  - Monitor all insertion sites, i.e. indwelling lines, tubes, and drains  - Monitor endotracheal if appropriate and nasal secretions for changes in amount and color  - [place] appropriate cooling/warming therapies per order  - Administer medications as ordered  - Instruct and encourage patient and family to use good hand hygiene technique  - Identify and instruct in appropriate isolation precautions for identified infection/condition  Outcome: Progressing  Goal: Absence of fever/infection during neutropenic period  Description: INTERVENTIONS:  - Monitor WBC    Outcome: Progressing     Problem: SAFETY ADULT  Goal: Patient will remain free of falls  Description: INTERVENTIONS:  - Educate patient/family on patient safety including physical limitations  - Instruct patient to call for assistance with activity   - Consult OT/PT to assist with strengthening/mobility   - Keep Call bell within reach  - Keep bed low and locked with side rails adjusted as appropriate  - Keep care items and personal  belongings within reach  - Initiate and maintain comfort rounds  - Make Fall Risk Sign visible to staff  - Offer Toileting in advance of need  - Initiate/Maintain bed alarm  - Obtain necessary fall risk management equipment  - Apply yellow socks and bracelet for high fall risk patients  - Consider moving patient to room near nurses station  Outcome: Progressing     Problem: DISCHARGE PLANNING  Goal: Discharge to home or other facility with appropriate resources  Description: INTERVENTIONS:  - Identify barriers to discharge w/patient and caregiver  - Arrange for needed discharge resources and transportation as appropriate  - Identify discharge learning needs (meds, wound care, etc.)  - Arrange for interpretive services to assist at discharge as needed  - Refer to Case Management Department for coordinating discharge planning if the patient needs post-hospital services based on physician/advanced practitioner order or complex needs related to functional status, cognitive ability, or social support system  Outcome: Progressing     Problem: Knowledge Deficit  Goal: Patient/family/caregiver demonstrates understanding of disease process, treatment plan, medications, and discharge instructions  Description: Complete learning assessment and assess knowledge base.  Interventions:  - Provide teaching at level of understanding  - Provide teaching via preferred learning methods  Outcome: Progressing     Problem: SAFETY ADULT  Goal: Maintain or return to baseline ADL function  Description: INTERVENTIONS:  -  Assess patient's ability to carry out ADLs; assess patient's baseline for ADL function and identify physical deficits which impact ability to perform ADLs (bathing, care of mouth/teeth, toileting, grooming, dressing, etc.)  - Assess/evaluate cause of self-care deficits   - Assess range of motion  - Assess patient's mobility; develop plan if impaired  - Assess patient's need for assistive devices and provide as appropriate  -  Encourage maximum independence but intervene and supervise when necessary  - Involve family in performance of ADLs  - Assess for home care needs following discharge   - Consider OT consult to assist with ADL evaluation and planning for discharge  - Provide patient education as appropriate  Outcome: Not Progressing  Goal: Maintains/Returns to pre admission functional level  Description: INTERVENTIONS:  - Perform AM-PAC 6 Click Basic Mobility/ Daily Activity assessment daily.  - Set and communicate daily mobility goal to care team and patient/family/caregiver.   - Collaborate with rehabilitation services on mobility goals if consulted  - Out of bed for toileting  - Record patient progress and toleration of activity level   Outcome: Not Progressing

## 2025-03-13 NOTE — ASSESSMENT & PLAN NOTE
Patient presented to [facility] with complaints of shortness of breath  Patient was hypoxic and required intubation and mechanical ventilation  Transferred to [facility] intensive care unit   x-ray demonstrates right lower lobe infiltrate possibly representing pneumonia    Plan:  Monitor white count  Completed azithromycin  Continue ceftriaxone day 5/7   Monitor fever curve  Updraft nebulizer treatments as needed  Continue bronchodilators  Day 4 of hydrocortisone, currently q12, tomorrow will be last day of treatment if he continues to improve.

## 2025-03-13 NOTE — ASSESSMENT & PLAN NOTE
Treat hypertension as needed  Off vasopressors for three days  Maintain MAP of 65  Cozaar 25 mg daily   Labetalol 10mg IV once

## 2025-03-13 NOTE — SPEECH THERAPY NOTE
Speech Language/Pathology    Speech/Language Pathology Progress Note    Patient Name: Severiano Feliciano  Today's Date: 3/13/2025         Summary:  Pt seen for follow up. Spouse present at bedside and 1:1 assisted with lunch. Pt demonstrated slow, but functional mastication with regular solids and adequate bite-strength. No overt s/s aspiration observed with nectar thick liquids. SLP reviewed results of recent VFSS, importance or oral care, and aspiration. All questions answered at this time. Pt ate ~75% of meal tray. Recommend  continuing Regular/nectar diet. SLP to follow.     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, psychotic disturbance, mood disturbance, or anxiety ([facility])    Hypertension    Bilateral hearing loss    Aneurysm, carotid artery, internal    Chronic diastolic (congestive) heart failure ([facility])    JAY treated with BiPAP    Type 2 diabetes mellitus with diabetic peripheral angiopathy without gangrene, without long-term current use of insulin ([facility])       Past Medical History  Past Medical History:   Diagnosis Date    Acute metabolic encephalopathy 06/13/2020    Age-related cataract of right eye 04/04/2023    patient is medically cleared and presents with low risk of complication with this upcoming R cataract surgery.    Anemia     Aneurysm, aorta, thoracic (HCC)     Appendicolith     Ascending aortic aneurysm (HCC)     3.7    Asthma     BPH (benign prostatic hyperplasia)      CAD (coronary artery disease)     noted on CT scan    CHF (congestive heart failure) ([facility])     COPD (chronic obstructive pulmonary disease) ([facility])     Delirium 04/25/2024    Descending thoracic aortic aneurysm (HCC)     Diabetes mellitus (HCC)     Diverticulosis     Former tobacco use     GERD (gastroesophageal reflux disease)     History of DVT (deep vein thrombosis)     Left leg    History of transfusion     Hypertension     Hypoxemia 04/30/2023    Inguinal hernia     right    Nephrolithiasis     Oxygen dependent     2LNC    Oxygen dependent     Pneumonia     Pre-diabetes     Prostate calculus     PVD (peripheral vascular disease) ([facility])     Recurrent right inguinal hernia w incarcertion 01/04/2023    SIRS (systemic inflammatory response syndrome) ([facility]) 09/04/2024    Solitary pulmonary nodule on lung CT 02/02/2025    8 x 5 mm right middle lobe nodule, most recently 5 x 3 mm (3/163) and about 4 x 2 mm in January 2024.       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[facility])     Varicose vein of leg     b/l        Past Surgical History  Past Surgical History:   Procedure Laterality Date    CARDIAC SURGERY      ESOPHAGOGASTRODUODENOSCOPY      HERNIA REPAIR Right 1/21/2019    Procedure: REPAIR HERNIA INGUINAL WITH MESH;  Surgeon: David Lowry MD;  Location:  MAIN OR;  Service: General    HERNIA REPAIR Right 7/22/2023    Procedure: REPAIR RECURRENT INCARERATED HERNIA INGUINAL OPEN, RIGHT ORCHIECTOMY;  Surgeon: Mason Bertrand MD;  Location: [facility];  Service: General    INGUINAL HERNIA REPAIR Bilateral     IR TEVAR  12/27/2018    [place] EVASC RPR DTA COVERAGE ART ORIGIN 1ST ENDOPROSTH N/A 12/27/2018    Procedure: TEVAR - endovascular thoracic aortic aneurysm repair;  Surgeon: Gladis Ortega MD;  Location: [facility];  Service: Vascular    THORACIC  AORTIC ANEURYSM REPAIR  12/27/2018    VARICOSE VEIN SURGERY Bilateral     vein stripping

## 2025-03-13 NOTE — ASSESSMENT & PLAN NOTE
Lab Results   Component Value Date    HGBA1C 6.1 (H) 03/09/2025       Recent Labs     03/12/25  1600 03/12/25  2227 03/13/25  0604 03/13/25  0732   POCGLU 110 141* 127 116       Blood Sugar Average: Last 72 hrs:  Hold home antihyperglycemics  Sliding scale insulin while in the ICU  Fingerstick blood glucose every 6 hours  Our goal will be to maintain his blood glucose between 140 and 180

## 2025-03-13 NOTE — PROGRESS NOTES
Progress Note - Critical Care/ICU   Name: Severiano Feliciano 89 y.o. male I MRN: [id number]  Unit/Bed#: ICU 03 I Date of Admission: 3/9/2025   Date of Service: 3/13/2025 I Hospital Day: 4       Assessment & Plan  Acute exacerbation of chronic obstructive pulmonary disease (COPD) (HCC)  Patient presented to [facility] with complaints of shortness of breath  Patient was hypoxic and required intubation and mechanical ventilation  Transferred to [facility] intensive care unit   x-ray demonstrates right lower lobe infiltrate possibly representing pneumonia    Plan:  Monitor white count  Completed azithromycin  Continue ceftriaxone day 5/7   Monitor fever curve  Updraft nebulizer treatments as needed  Continue bronchodilators  Day 4 of hydrocortisone, currently q12, tomorrow will be last day of treatment if he continues to improve.     Dysphagia  Nutrition/SLP following.  Nectar thick diet.   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, psychotic disturbance, mood disturbance, or anxiety ([facility])  Redirect patient as needed  Sedation while intubated  Hypertension  Treat hypertension as needed  Off vasopressors for three days  Maintain MAP of 65  Cozaar 25 mg daily   Labetalol 10mg IV once   Bilateral hearing loss  Maintain hearing aids if in patient's possession  Aneurysm, carotid artery, internal  Status post stenting  Monitor blood pressure, treat hypertension as needed  Continue Cozaar with hold parameters   Chronic diastolic (congestive) heart failure ([facility])  Wt Readings from Last 3 Encounters:   03/12/25 66.5 kg (146 lb 9.7 oz)   02/13/25 59.9 kg (132 lb)   02/02/25 60.3 kg (132 lb 15 oz)     Daily weights  Strict I's and O's  Diuretics as needed          JAY treated with BiPAP    BIPAP when extubated qHS and prn  Type 2 diabetes mellitus with diabetic peripheral angiopathy without gangrene, without long-term current use of insulin (HCC)  Lab Results   Component Value Date     HGBA1C 6.1 (H) 03/09/2025       Recent Labs     03/12/25  1600 03/12/25  2227 03/13/25  0604 03/13/25  0732   POCGLU 110 141* 127 116       Blood Sugar Average: Last 72 hrs:  Hold home antihyperglycemics  Sliding scale insulin while in the ICU  Fingerstick blood glucose every 6 hours  Our goal will be to maintain his blood glucose between 140 and 180    Disposition: Stepdown Level 1    ICU Core Measures     A: Assess, Prevent, and Manage Pain Has pain been assessed? Yes  Need for changes to pain regimen? No   B: Both SAT/SAT  N/A   C: Choice of Sedation RASS Goal: 0 Alert and Calm  Need for changes to sedation or analgesia regimen? No   D: Delirium CAM-ICU: Unable to perform secondary to Acute cognitive dysfunction   E: Early Mobility  Plan for early mobility? Yes   F: Family Engagement Plan for family engagement today? Yes       Antibiotic Review: Patient on appropriate coverage based on culture data.     Review of Invasive Devices:            Prophylaxis:  VTE VTE covered by:  heparin (porcine), Subcutaneous, 5,000 Units at 03/13/25 0553       Stress Ulcer  covered bypantoprazole (PROTONIX) 40 mg tablet [[id number]] (Long-Term Med), pantoprazole (PROTONIX) EC tablet 40 mg [[id number]]         24 Hour Events : Was not able to sleep, 12.5 mg seroquel given. Not tolerating CPAP. On 1:1. Remains hypertensive overnight, 10mg IV labetalol administered at ~4am. This morning agitated but redirectable.   Subjective       Objective :                   Vitals I/O      Most Recent Min/Max in 24hrs   Temp 98 °F (36.7 °C) Temp  Min: 97.5 °F (36.4 °C)  Max: 98.1 °F (36.7 °C)   Pulse 68 Pulse  Min: 65  Max: 119   Resp (!) 34 Resp  Min: 16  Max: 40   BP (!) 176/76 BP  Min: 146/73  Max: 212/97   O2 Sat 96 % SpO2  Min: 87 %  Max: 97 %      Intake/Output Summary (Last 24 hours) at 3/13/2025 0822  Last data filed at 3/12/2025 1600  Gross per 24 hour   Intake --   Output 800 ml   Net -800 ml       Diet Dysphagia/Modified Consistency;  Dysphagia 1-Pureed; Nectar Thick Liquid    Invasive Monitoring           Physical Exam   Physical Exam  Eyes:      General: No scleral icterus.     Extraocular Movements: Extraocular movements intact.      Pupils: Pupils are equal, round, and reactive to light.   Skin:     General: Skin is warm and dry.      Findings: No rash.   HENT:      Head: Normocephalic.      Nose: No congestion.      Mouth/Throat:      Mouth: Mucous membranes are moist.      Pharynx: No oropharyngeal exudate.   Cardiovascular:      Rate and Rhythm: Normal rate and regular rhythm.      Heart sounds: Normal heart sounds.   Musculoskeletal:         General: No swelling.   Abdominal: General: There is no distension.      Palpations: Abdomen is soft.      Tenderness: There is no abdominal tenderness. There is no guarding.   Constitutional:       General: He is not in acute distress.     Appearance: He is ill-appearing.      Comments: Agitated, redirectable.      Pulmonary:      Effort: Tachypnea present. No accessory muscle usage, respiratory distress or accessory muscle usage.      Comments: Diminished inspiratory effort.   CTA.   Tachypneic.   Neurological:      Mental Status: He is agitated, disoriented to place, disoriented to time and disoriented to situation.          Diagnostic Studies        Lab Results: I have reviewed the following results:     Medications:  Scheduled PRN   cefTRIAXone, 1,000 mg, Q24H  chlorhexidine, 15 mL, Q12H KIKE  heparin (porcine), 5,000 Units, Q8H KIKE  hydrocortisone sodium succinate, 50 mg, Q12H  insulin lispro, 2-12 Units, 4x Daily (AC & HS)  ipratropium, 0.5 mg, TID  levalbuterol, 1.25 mg, TID  losartan, 25 mg, Daily  melatonin, 6 mg, HS  pantoprazole, 40 mg, Early Morning  potassium chloride, 20 mEq, Once      acetaminophen, 975 mg, Q6H PRN       Continuous    dextrose, 75 mL/hr, Last Rate: 75 mL/hr (03/12/25 1624)         Labs:   CBC    Recent Labs     03/12/25  0637 03/13/25  0551   WBC 9.67 9.54   HGB 9.2*  10.6*   HCT 30.6* 35.1*   * 139*     BMP    Recent Labs     03/12/25  0550 03/13/25  0551   SODIUM 146 146   K 4.1 3.4*    101   CO2 38* 39*   AGAP 6 6   BUN 33* 32*   CREATININE 0.88 0.77   CALCIUM 9.0 9.0       Coags    No recent results     Additional Electrolytes  Recent Labs     03/12/25  0550 03/13/25  0551   MG 2.5 2.1   PHOS 3.4 2.3   CAIONIZED 1.14 1.15          Blood Gas    No recent results  No recent results LFTs  No recent results    Infectious  No recent results  Glucose  Recent Labs     03/12/25  0550 03/13/25  0551   GLUC 104 118

## 2025-03-13 NOTE — ASSESSMENT & PLAN NOTE
Maintain hearing aids if in patient's possession   formerly [facility] Living Authorization    The [facility] Review Committee has reviewed this case and the patient IS APPROVED for discharge to a facility for Short Term Skilled once the following procedure is followed:     - The physician discharge instructions (contained within the [facility] note for SNF) must inlcude the below appropriate and approved COVID instructions to the facility    For questions regarding [facility] approval process, please contact the CM assigned to the case.  For questions regarding RN discharge workflow, please contact the unit Clinical Leader.

## 2025-03-14 PROBLEM — J44.9 COPD, VERY SEVERE (HCC): Status: ACTIVE | Noted: 2024-04-25

## 2025-03-14 PROBLEM — H91.93 BILATERAL HEARING LOSS: Status: RESOLVED | Noted: 2023-10-25 | Resolved: 2025-03-14

## 2025-03-14 PROBLEM — J69.0 ASPIRATION PNEUMONIA (HCC): Status: ACTIVE | Noted: 2020-06-12

## 2025-03-14 LAB
ANION GAP SERPL CALCULATED.3IONS-SCNC: 1 MMOL/L (ref 4–13)
BACTERIA BLD CULT: NORMAL
BASOPHILS # BLD AUTO: 0.02 THOUSANDS/ÂΜL (ref 0–0.1)
BASOPHILS NFR BLD AUTO: 0 % (ref 0–1)
BUN SERPL-MCNC: 20 MG/DL (ref 5–25)
CA-I BLD-SCNC: 1.14 MMOL/L (ref 1.12–1.32)
CALCIUM SERPL-MCNC: 8.8 MG/DL (ref 8.4–10.2)
CHLORIDE SERPL-SCNC: 99 MMOL/L (ref 96–108)
CO2 SERPL-SCNC: 43 MMOL/L (ref 21–32)
CREAT SERPL-MCNC: 0.71 MG/DL (ref 0.6–1.3)
EOSINOPHIL # BLD AUTO: 0.18 THOUSAND/ÂΜL (ref 0–0.61)
EOSINOPHIL NFR BLD AUTO: 2 % (ref 0–6)
ERYTHROCYTE [DISTWIDTH] IN BLOOD BY AUTOMATED COUNT: 13.3 % (ref 11.6–15.1)
GFR SERPL CREATININE-BSD FRML MDRD: 84 ML/MIN/1.73SQ M
GLUCOSE SERPL-MCNC: 112 MG/DL (ref 65–140)
GLUCOSE SERPL-MCNC: 115 MG/DL (ref 65–140)
GLUCOSE SERPL-MCNC: 116 MG/DL (ref 65–140)
GLUCOSE SERPL-MCNC: 143 MG/DL (ref 65–140)
GLUCOSE SERPL-MCNC: 199 MG/DL (ref 65–140)
HCT VFR BLD AUTO: 35.9 % (ref 36.5–49.3)
HGB BLD-MCNC: 10.8 G/DL (ref 12–17)
IMM GRANULOCYTES # BLD AUTO: 0.05 THOUSAND/UL (ref 0–0.2)
IMM GRANULOCYTES NFR BLD AUTO: 1 % (ref 0–2)
LYMPHOCYTES # BLD AUTO: 1.3 THOUSANDS/ÂΜL (ref 0.6–4.47)
LYMPHOCYTES NFR BLD AUTO: 16 % (ref 14–44)
MAGNESIUM SERPL-MCNC: 2.1 MG/DL (ref 1.9–2.7)
MCH RBC QN AUTO: 32 PG (ref 26.8–34.3)
MCHC RBC AUTO-ENTMCNC: 30.1 G/DL (ref 31.4–37.4)
MCV RBC AUTO: 107 FL (ref 82–98)
MONOCYTES # BLD AUTO: 1.12 THOUSAND/ÂΜL (ref 0.17–1.22)
MONOCYTES NFR BLD AUTO: 14 % (ref 4–12)
NEUTROPHILS # BLD AUTO: 5.53 THOUSANDS/ÂΜL (ref 1.85–7.62)
NEUTS SEG NFR BLD AUTO: 67 % (ref 43–75)
NRBC BLD AUTO-RTO: 0 /100 WBCS
PHOSPHATE SERPL-MCNC: 2.5 MG/DL (ref 2.3–4.1)
PLATELET # BLD AUTO: 123 THOUSANDS/UL (ref 149–390)
PMV BLD AUTO: 9.8 FL (ref 8.9–12.7)
POTASSIUM SERPL-SCNC: 3.2 MMOL/L (ref 3.5–5.3)
RBC # BLD AUTO: 3.37 MILLION/UL (ref 3.88–5.62)
SODIUM SERPL-SCNC: 143 MMOL/L (ref 135–147)
WBC # BLD AUTO: 8.2 THOUSAND/UL (ref 4.31–10.16)

## 2025-03-14 PROCEDURE — 94640 AIRWAY INHALATION TREATMENT: CPT

## 2025-03-14 PROCEDURE — 82948 REAGENT STRIP/BLOOD GLUCOSE: CPT

## 2025-03-14 PROCEDURE — 84100 ASSAY OF PHOSPHORUS: CPT

## 2025-03-14 PROCEDURE — 97163 PT EVAL HIGH COMPLEX 45 MIN: CPT

## 2025-03-14 PROCEDURE — 99223 1ST HOSP IP/OBS HIGH 75: CPT

## 2025-03-14 PROCEDURE — 99232 SBSQ HOSP IP/OBS MODERATE 35: CPT | Performed by: INTERNAL MEDICINE

## 2025-03-14 PROCEDURE — 97167 OT EVAL HIGH COMPLEX 60 MIN: CPT

## 2025-03-14 PROCEDURE — 80048 BASIC METABOLIC PNL TOTAL CA: CPT

## 2025-03-14 PROCEDURE — 99233 SBSQ HOSP IP/OBS HIGH 50: CPT | Performed by: INTERNAL MEDICINE

## 2025-03-14 PROCEDURE — 82330 ASSAY OF CALCIUM: CPT

## 2025-03-14 PROCEDURE — 94760 N-INVAS EAR/PLS OXIMETRY 1: CPT

## 2025-03-14 PROCEDURE — 92526 ORAL FUNCTION THERAPY: CPT

## 2025-03-14 PROCEDURE — 83735 ASSAY OF MAGNESIUM: CPT

## 2025-03-14 PROCEDURE — 85025 COMPLETE CBC W/AUTO DIFF WBC: CPT

## 2025-03-14 PROCEDURE — 94003 VENT MGMT INPAT SUBQ DAY: CPT

## 2025-03-14 RX ORDER — ONDANSETRON 2 MG/ML
4 INJECTION INTRAMUSCULAR; INTRAVENOUS EVERY 6 HOURS PRN
Status: DISCONTINUED | OUTPATIENT
Start: 2025-03-14 | End: 2025-03-15 | Stop reason: HOSPADM

## 2025-03-14 RX ORDER — POTASSIUM CHLORIDE 14.9 MG/ML
20 INJECTION INTRAVENOUS
Status: COMPLETED | OUTPATIENT
Start: 2025-03-14 | End: 2025-03-15

## 2025-03-14 RX ORDER — POLYETHYLENE GLYCOL 3350 17 G/17G
17 POWDER, FOR SOLUTION ORAL DAILY PRN
Status: DISCONTINUED | OUTPATIENT
Start: 2025-03-14 | End: 2025-03-15 | Stop reason: HOSPADM

## 2025-03-14 RX ORDER — MAGNESIUM HYDROXIDE/ALUMINUM HYDROXICE/SIMETHICONE 120; 1200; 1200 MG/30ML; MG/30ML; MG/30ML
30 SUSPENSION ORAL EVERY 4 HOURS PRN
Status: DISCONTINUED | OUTPATIENT
Start: 2025-03-14 | End: 2025-03-15 | Stop reason: HOSPADM

## 2025-03-14 RX ORDER — ENOXAPARIN SODIUM 100 MG/ML
40 INJECTION SUBCUTANEOUS
Status: DISCONTINUED | OUTPATIENT
Start: 2025-03-15 | End: 2025-03-15 | Stop reason: HOSPADM

## 2025-03-14 RX ADMIN — IPRATROPIUM BROMIDE 0.5 MG: 0.5 SOLUTION RESPIRATORY (INHALATION) at 14:06

## 2025-03-14 RX ADMIN — MELATONIN 6 MG: 3 TAB ORAL at 21:29

## 2025-03-14 RX ADMIN — LEVALBUTEROL HYDROCHLORIDE 1.25 MG: 1.25 SOLUTION RESPIRATORY (INHALATION) at 20:25

## 2025-03-14 RX ADMIN — CEFTRIAXONE 1000 MG: 10 INJECTION, POWDER, FOR SOLUTION INTRAVENOUS at 17:53

## 2025-03-14 RX ADMIN — IPRATROPIUM BROMIDE 0.5 MG: 0.5 SOLUTION RESPIRATORY (INHALATION) at 07:00

## 2025-03-14 RX ADMIN — SODIUM CHLORIDE 250 ML: 0.9 INJECTION, SOLUTION INTRAVENOUS at 21:23

## 2025-03-14 RX ADMIN — POTASSIUM CHLORIDE 20 MEQ: 14.9 INJECTION, SOLUTION INTRAVENOUS at 23:16

## 2025-03-14 RX ADMIN — POTASSIUM CHLORIDE 20 MEQ: 14.9 INJECTION, SOLUTION INTRAVENOUS at 21:22

## 2025-03-14 RX ADMIN — LOSARTAN POTASSIUM 50 MG: 50 TABLET, FILM COATED ORAL at 09:08

## 2025-03-14 RX ADMIN — PANTOPRAZOLE SODIUM 40 MG: 40 TABLET, DELAYED RELEASE ORAL at 09:08

## 2025-03-14 RX ADMIN — CHLORHEXIDINE GLUCONATE 15 ML: 1.2 SOLUTION ORAL at 09:08

## 2025-03-14 RX ADMIN — LEVALBUTEROL HYDROCHLORIDE 1.25 MG: 1.25 SOLUTION RESPIRATORY (INHALATION) at 14:06

## 2025-03-14 RX ADMIN — LEVALBUTEROL HYDROCHLORIDE 1.25 MG: 1.25 SOLUTION RESPIRATORY (INHALATION) at 07:00

## 2025-03-14 RX ADMIN — IPRATROPIUM BROMIDE 0.5 MG: 0.5 SOLUTION RESPIRATORY (INHALATION) at 20:25

## 2025-03-14 RX ADMIN — INSULIN LISPRO 2 UNITS: 100 INJECTION, SOLUTION INTRAVENOUS; SUBCUTANEOUS at 12:43

## 2025-03-14 NOTE — ASSESSMENT & PLAN NOTE
Wt Readings from Last 3 Encounters:   03/14/25 57.8 kg (127 lb 6.8 oz)   02/13/25 59.9 kg (132 lb)   02/02/25 60.3 kg (132 lb 15 oz)   Appears at dry weight and slightly below  Continue goal-directed medical therapy and diuretics as needed

## 2025-03-14 NOTE — PLAN OF CARE
Problem: PHYSICAL THERAPY ADULT  Goal: Performs mobility at highest level of function for planned discharge setting.  See evaluation for individualized goals.  Description: Treatment/Interventions: Functional transfer training, LE strengthening/ROM, Elevations, Therapeutic exercise, Endurance training, Patient/family training, Bed mobility, Gait training, Spoke to nursing, OT, Family  Equipment Recommended: Walker (pt has)       See flowsheet documentation for full assessment, interventions and recommendations.  Note: Prognosis: Fair  Problem List: Decreased strength, Decreased endurance, Impaired balance, Decreased mobility, Impaired judgement, Decreased cognition, Decreased safety awareness, Impaired hearing (?close to baseline deficits)  Assessment: Pt. 89 y.o.male admitted for Acute exacerbation of chronic obstructive pulmonary disease (COPD) ([facility]) w/ Pneumonia, toxic metabolic encephalopathy & hyponatremia. Pt referred to PT for mobility assessment & D/C planning. Please see above for information re: home set-up & PLOF as well as objective findings during PT assessment. PTA, pt's wife reports pt require assistance w/ overall functional mobility including amb & stair management at baseline. On eval, pt functioning below baseline hence will continue skilled PT to improve function & safety. Pt require modAx1 for bed mobility & minAx1 for transfers & amb w/ RW + cues for techniques & safety. Gait deviations as above but no gross LOB noted. Provided chair follow during amb. The patient's AM-PAC Basic Mobility Inpatient Short Form Raw Score is 16. A Raw score of less than or equal to 16 suggests the patient may benefit from discharge to post-acute rehabilitation services. Please also refer to the recommendation of the Physical Therapist for safe discharge planning. From PT standpoint, will recommend Level II (moderate resource intensity) rehab services vs Level III (minimum resource intensity) rehab services, and  "inc family support at D/C, pending progress. Pt on 6L O2 NC t/o session. No SOB & dizziness reported t/o session. Nsg staff most recent vital signs as follows: /81 (BP Location: Left arm)   Pulse 71   Temp 98.4 °F (36.9 °C) (Temporal)   Resp 16   Ht 5' 2.99\" (1.6 m)   Wt 57.8 kg (127 lb 6.8 oz)   SpO2 91%   BMI 22.58 kg/m² . At end of session, pt OOB in chair in stable condition, call bell & phone in reach, chair alarm activated, all lines intact. Fall precautions reinforced w/ good understanding. CM to follow. Nsg staff to continue to mobilized pt (OOB in chair for all meals & ambulate in room/unit) as tolerated to prevent further decline in function. Will also recommend Restorative for daily amb &/or daily OOB in chair as appropriate. Nsg notified. Co-eval was necessary to complete this PT eval for the pt's best interest given pt's medical acuity & complexity.        Rehab Resource Intensity Level, PT: II (Moderate Resource Intensity) (vs III, pending progress)    See flowsheet documentation for full assessment.        "

## 2025-03-14 NOTE — ASSESSMENT & PLAN NOTE
History of very severe COPD, had previously been scheduled for outpatient palliative care  Continue bronchodilators, Atrovent and Xopenex 3 times daily  On discharge will plan to resume budesonide twice daily and DuoNebs 3 times daily  Patient has scheduled hospital follow-up on 3/19/2025 with pulmonary  To wean back to home oxygen requirement

## 2025-03-14 NOTE — PLAN OF CARE
Problem: OCCUPATIONAL THERAPY ADULT  Goal: Performs self-care activities at highest level of function for planned discharge setting.  See evaluation for individualized goals.  Description: Treatment Interventions: ADL retraining, Functional transfer training, UE strengthening/ROM, Endurance training, Cognitive reorientation, Patient/family training, Equipment evaluation/education, Compensatory technique education, Continued evaluation          See flowsheet documentation for full assessment, interventions and recommendations.   Note: Limitation: Decreased ADL status, Decreased UE strength, Decreased Safe judgement during ADL, Decreased cognition, Decreased endurance, Decreased high-level ADLs  Prognosis: Fair  Assessment: Pt is a 88y/o male admitted to the hospital([facility]) 2* symptoms of altered mental status, acute respiratory failure(VDRF). Pt noted with questionable PNA, acute exacerbaion of COPD. Pt with PMH anemia, AAA, COPD, CAD, O2 dependent, DM, BPH, PVD, dementia, and recent(last month) hospitalization 2* AMS. PTA wife states pt had assistance with his ADLs, transfers, ambulation--limited distances, uses RW; w/c for longer distances; neg falls. During initial eval, pt demonstrated deficits with his functional balance, functional mobility, ADL status, transfer safety, b/l UE strength, activity tolerance(currently fair=152-20mins), and cognition(i.e.orientation, memory, problem-solving, judgement/safety). Pt would benefit from continued OT tx for the above deficits. 2-5xwk/1-2wks. The patient's raw score on the -PAC Daily Activity Inpatient Short Form is 15. A raw score of less than 19 suggests the patient may benefit from discharge to post-acute rehabilitation services. Please refer to the recommendation of the Occupational Therapist for safe discharge planning.     Rehab Resource Intensity Level, OT: II (Moderate Resource Intensity)

## 2025-03-14 NOTE — ASSESSMENT & PLAN NOTE
Day 5 of 7 of antibiotic therapy  Likely switched to potassium at discharge  Completed 3 days of antibiotic treatment  Continue regular diet with nectar thick liquids upon discharge

## 2025-03-14 NOTE — PROGRESS NOTES
Progress Note - Hospitalist   Name: Severiano Feliciano 89 y.o. male I MRN: [id number]  Unit/Bed#: E4 -01 I Date of Admission: 3/9/2025   Date of Service: 3/14/2025 I Hospital Day: 5    Assessment & Plan  COPD, very severe (HCC)  History of very severe COPD, had previously been scheduled for outpatient palliative care  Continue bronchodilators, Atrovent and Xopenex 3 times daily  On discharge will plan to resume budesonide twice daily and DuoNebs 3 times daily  Patient has scheduled hospital follow-up on 3/19/2025 with pulmonary  To wean back to home oxygen requirement  Aspiration pneumonia (HCC)  Day 5 of 7 of antibiotic therapy  Likely switched to potassium at discharge  Completed 3 days of antibiotic treatment  Continue regular diet with nectar thick liquids upon discharge  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, psychotic disturbance, mood disturbance, or anxiety (HCC)  Has moderate vascular and hypoxic dementia without behavioral or psychotic mood disorder  Developing some delirium  Hypertension  Continue losartan was also previously on amlodipine which was discontinued  Aneurysm, carotid artery, internal  History of status post stenting  Continue Cozaar with hold parameters  Acute on chronic respiratory failure with hypoxia and hypercapnia (HCC)  Due to aspiration pneumonia in the setting of his severe COPD  Baseline oxygen requirement of 2 to 3 L of will continue to wean back  Has BiPAP at home which is ordered at night but he is significant noncompliant  Patient will likely be discharged home tomorrow if remains clinically stable  Chronic diastolic (congestive) heart failure (HCC)  Wt Readings from Last 3 Encounters:   03/14/25 57.8 kg (127 lb 6.8 oz)   02/13/25 59.9 kg (132 lb)   02/02/25 60.3 kg (132 lb 15 oz)   Appears at dry weight and slightly below  Continue goal-directed medical therapy and diuretics as needed          JAY treated with BiPAP  Patient not adherent to BiPAP,  will encourage   Type 2 diabetes mellitus with diabetic peripheral angiopathy without gangrene, without long-term current use of insulin ([facility])  Lab Results   Component Value Date    HGBA1C 6.1 (H) 03/09/2025       Recent Labs     03/13/25  1819 03/13/25 2001 03/14/25  0136 03/14/25  1201   POCGLU 155* 141* 112 199*       Blood Sugar Average: Last 72 hrs:  (P) 130.75  Continue sliding scale and basal bolus protocol              Hospital Course:     89-year-old male patient presenting with acute hypoxemic respiratory failure secondary to severe COPD in combination with aspiration pneumonia.  Initially required ICU level care.  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, psychotic disturbance, mood disturbance, or anxiety ([facility])    Hypertension    Aneurysm, carotid artery, internal    Acute on chronic respiratory failure with hypoxia and hypercapnia (HCC)    Chronic diastolic (congestive) heart failure ([facility])    JAY treated with BiPAP    Type 2 diabetes mellitus with diabetic peripheral angiopathy without gangrene, without long-term current use of insulin ([facility])      Plan:    Advance diet  Continue antibiotics  Discussed with wife, possible discharge home this weekend  Geriatrics consultation to discuss goals of care and possible       VTE Pharmacologic Prophylaxis:   Pharmacologic: Enoxaparin (Lovenox)  Mechanical VTE Prophylaxis in Place: Yes    AM-PAC Basic Mobility:  Basic Mobility Inpatient Raw Score: 16    JH-HLM Achieved: 7: Walk 25 feet or more  JH-HLM Goal: 5: Stand one or more mins    HLM Goal listed above. Continue with multidisciplinary rounding and encourage appropriate mobility to improve upon HLM goals.         Patient Centered Rounds: Case discussed and reviewed with nursing    Discussions with Specialists or Other Care Team Provider:  Case management    Education and Discussions with Family / Patient: Discussed with patient wife at bedside    Time Spent for Care: 80 minutes.  More than 50% of total time spent on counseling and coordination of care as described above.    Current Length of Stay: 5 day(s)    Current Patient Status: Inpatient   Certification Statement: The patient will continue to require additional inpatient hospital stay due to need for IV antibiotics and weaning of oxygen    Discharge Plan / Estimated Discharge Date: Anticipate discharge in the next 24 to 48 hours, although patient remains superhigh risk for readmission    Code Status: Level 1 - Full Code      Subjective:   Seen and examined, patient is pleasantly demented he offers no history    A complete and comprehensive 14 point organ system review has been performed and all other systems are negative other than stated above.    Objective:     Vitals:   Temp (24hrs), Av.2 °F (36.8 °C), Min:97.8 °F (36.6 °C), Max:98.4 °F (36.9 °C)    Temp:  [97.8 °F (36.6 °C)-98.4 °F (36.9 °C)] 98.2 °F (36.8 °C)  HR:  [68-74] 68  Resp:  [16-22] 16  BP: (144-175)/(79-86) 144/79  SpO2:  [90 %-98 %] 98 %  Body mass index is 22.58 kg/m².     Input and Output Summary (last 24 hours):       Intake/Output Summary (Last 24 hours) at 3/14/2025 1701  Last data filed at 3/14/2025 1201  Gross per 24 hour   Intake 720 ml   Output --   Net 720 ml       Physical Exam:     General:ill appearing, no acute distress  HEENT: atraumatic, PERRLA, moist mucosa, normal pharynx, normal tonsils and adenoids, normal tongue, no fluid in sinuses  Neck: Trachea midline, no carotid bruit, no masses  Respiratory: normal chest wall expansion, CTA B, no r/r/w, no rubs  Cardiovascular: RRR, no m/r/g, Normal S1 and S2  Abdomen: Soft, non-tender, non-distended, normal bowel sounds in all quadrants, no hepatosplenomegaly, no tympany  Rectal: deferred  Musculoskeletal: normal ROM in upper and lower extremities  Integumentary:  warm, dry, and pink, with no rash, purpura, or petechia  Heme/Lymph: no lymphadenopathy, no bruises  Neurological: Cranial Nerves II-XII grossly intact  Psychiatric: Apparent dementia      Additional Data:     Labs:    Results from last 7 days   Lab Units 03/14/25  0638   WBC Thousand/uL 8.20   HEMOGLOBIN g/dL 10.8*   HEMATOCRIT % 35.9*   PLATELETS Thousands/uL 123*   SEGS PCT % 67   LYMPHO PCT % 16   MONO PCT % 14*   EOS PCT % 2     Results from last 7 days   Lab Units 03/14/25  0638 03/10/25  0547 03/09/25 1951   POTASSIUM mmol/L 3.2*   < > 3.4*   CHLORIDE mmol/L 99   < > 100   CO2 mmol/L 43*   < > 37*   BUN mg/dL 20   < > 18   CREATININE mg/dL 0.71   < > 0.68   CALCIUM mg/dL 8.8   < > 8.6   ALK PHOS U/L  --   --  45   ALT U/L  --   --  18   AST U/L  --   --  30    < > = values in this interval not displayed.     Results from last 7 days   Lab Units 03/09/25 1959   INR  1.12       * I Have Reviewed All Lab Data Listed Above.  * Additional Pertinent Lab Tests Reviewed: All Labs For Current Hospital Admission Reviewed      Lines/Drains:  Invasive Devices       Peripheral Intravenous Line  Duration             Peripheral IV 03/12/25 Distal;Right;Ventral (anterior) Forearm 2 days    Peripheral IV 03/12/25 Distal;Upper;Ventral (anterior);Right Arm 1 day                          Imaging:  Imaging Reports Reviewed Today Include:   XR chest portable ICU  Result Date: 3/11/2025  Impression: Congestive changes with right lower lobe infiltrate and bilateral small effusions more on the right Workstation performed: [id number]     Bronchoscopy  Result Date: 3/9/2025  Impression: Erythematous, hyperemic mucosa in the left main stem, left upper lobar bronchus, bronchus intermedius and right lower lobar bronchus; performed washing Bronchoalveolar lavage was performed x2 in the right apical segment (RB1) RECOMMENDATION: Follow BAL cx      CTA dissection protocol chest abdomen pelvis w wo contrast  Result Date:  3/9/2025  Impression: Interval appearance of a dense consolidation occupying the majority of the right lower lobe. New occlusion of proximal right lower and middle lobe bronchi. No pulmonary embolism identified allowing for mild respiratory motion artifact peripherally. 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 Redemonstrated 8 mm right middle lobe nodule. Continue with February 2025 recommendation of 6-month chest CT follow-up from that time. The study was marked in EPIC for immediate notification. Workstation performed: [id number]     CT head without contrast  Result Date: 3/9/2025  Impression: 1.  No acute intracranial CT abnormality. 2.  Stable right greater than left anterior inferior bilateral frontal lobe encephalomalacia, likely sequela of remote trauma or infarct.. 3.  Chronic microangiopathic change. 4.  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 Resident: Kin Miller I, the attending radiologist, have reviewed the images and agree with the final report above. Workstation performed: [id number]     XR chest 1 view portable  Result Date: 3/9/2025  Impression: Endotracheal and enteric tube as above. Dense right lower lobe consolidation, suspicious for pneumonia /aspiration pneumonitis. Resident: Ivan Duran I, the attending radiologist, have reviewed the images and agree with the final report above. Workstation performed: [id number]       Telemetry:       Recent Cultures (last 7 days):   Results from last 7 days   Lab Units 03/09/25  1745 03/09/25  1741 03/09/25  1705 03/09/25  1107   BLOOD CULTURE  No Growth After 4 Days. No Growth After 4 Days.  --  No Growth After 5 Days.  No Growth After 5 Days.   GRAM STAIN RESULT   --   --  No Polys or Bacteria seen  --        Last 24 Hours Medication List:   Current Facility-Administered Medications   Medication Dose Route Frequency Provider Last Rate    acetaminophen  975 mg  Oral Q6H PRN Jayshree Main MD      cefTRIAXone  1,000 mg Intravenous Q24H Jayshree Main MD 1,000 mg (03/13/25 5122)    chlorhexidine  15 mL Mouth/Throat Q12H KIKE Jayshree Main MD      insulin lispro  2-12 Units Subcutaneous 4x Daily (AC & HS) Jayshree Main MD      ipratropium  0.5 mg Nebulization TID Jayshree Main MD      levalbuterol  1.25 mg Nebulization TID Jayshree Main MD      losartan  50 mg Oral Daily Jayshree Main MD      melatonin  6 mg Oral HS Jayshree Main MD      pantoprazole  40 mg Oral Early Morning Jayshree Main MD         AM-PAC Basic Mobility:  Basic Mobility Inpatient Raw Score: 16    JH-HLM Achieved: 7: Walk 25 feet or more  JH-HLM Goal: 5: Stand one or more mins    HLM Goal listed above. Continue with multidisciplinary rounding and encourage appropriate mobility to improve upon HLM goals.     Today, Patient Was Seen By: Benson Gold DO    ** Please Note: This note was completed in part utilizing [employer] Dragon One [employer] software dictation.  Grammatical errors, random word insertions, spelling mistakes, and incomplete sentences may be an occasional consequence of this system secondary to software limitations, ambient noise, and hardware issues.  If you have any questions or concerns about the content, text, or information contained within the body of this dictation, please contact the provider for clarification. **

## 2025-03-14 NOTE — PROGRESS NOTES
Progress Note - Pulmonology   Name: Severiano Feliciano 89 y.o. male I MRN: [id number]  Unit/Bed#: E4 -01 I Date of Admission: 3/9/2025   Date of Service: 3/14/2025 I Hospital Day: 5    Assessment & Plan  Acute on chronic respiratory failure with hypoxia and hypercapnia (HCC)  Due to aspiration pneumonia in setting of severe COPD  Now on his baseline oxygen requirement 2-3 lpm  He has BIPAP at home and ordered at night but he is non-adherent despite counseling  Aspiration pneumonia (HCC)  Day 5/7 antibiotics- cefepime initially now ceftriaxone.  Can switch to PO if discharged before completion- cefpodoxime  Completed azithromycin 500 mg x 3 days  Aspiration precautions- discussed with patient and family  COPD, very severe (HCC)  Not currently in exacerbation. Off steroids.  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  Appt on 3/19 in pulm office scheduled  Discussed with [facility] via [employer] secure chat  24 Hour Events : Transferred out of ICU  Subjective : Today no complaints.  On 2.5 lpm of oxygen at baseline    Objective :  Temp:  [97.8 °F (36.6 °C)-98.4 °F (36.9 °C)] 98.4 °F (36.9 °C)  HR:  [69-77] 71  BP: (136-175)/(65-86) 158/81  Resp:  [16-36] 16  SpO2:  [90 %-96 %] 91 %  O2 Device: Nasal cannula  Nasal Cannula O2 Flow Rate (L/min):  [2.5 L/min-5 L/min] 2.5 L/min    Physical Exam  Vitals and nursing note reviewed.   Constitutional:       General: He is not in acute distress.     Appearance: He is well-developed.   HENT:      Head: Normocephalic and atraumatic.   Eyes:       Conjunctiva/sclera: Conjunctivae normal.   Cardiovascular:      Rate and Rhythm: Normal rate and regular rhythm.      Heart sounds: No murmur heard.  Pulmonary:      Effort: Pulmonary effort is normal. No respiratory distress.      Breath sounds: No wheezing.      Comments: Distant breath sounds throughout  Abdominal:      Palpations: Abdomen is soft.      Tenderness: There is no abdominal tenderness.   Musculoskeletal:         General: No swelling.      Cervical back: Neck supple.      Right lower leg: No edema.      Left lower leg: No edema.   Skin:     General: Skin is warm and dry.      Capillary Refill: Capillary refill takes less than 2 seconds.   Neurological:      General: No focal deficit present.      Mental Status: He is alert. Mental status is at baseline.   Psychiatric:         Mood and Affect: Mood normal.           Lab Results: I have reviewed the following results:   .     03/14/25  0638   WBC 8.20   HGB 10.8*   HCT 35.9*   *   SODIUM 143   K 3.2*   CL 99   CO2 43*   BUN 20   CREATININE 0.71   GLUC 143*   CAIONIZED 1.14   MG 2.1   PHOS 2.5     ABG: No new results in last 24 hours.    Imaging Results Review: I personally reviewed the following image studies in PACS and associated radiology reports:     CXR 3/11/25  Congestive changes with right lower lobe infiltrate and bilateral small effusions more on the right      CT Chest/Abd/Pelvis 3/9/25  Interval appearance of a dense consolidation occupying the majority of the right lower lobe.  New occlusion of proximal right lower and middle lobe bronchi.     No pulmonary embolism identified allowing for mild respiratory motion artifact peripherally.     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     Redemonstrated 8 mm right middle lobe nodule.    CT Head 3/9/25  1.  No acute intracranial CT abnormality.  2.  Stable right greater than left anterior inferior bilateral frontal lobe encephalomalacia, likely sequela of remote trauma or  infarct..  3.  Chronic microangiopathic change.  4.  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       Other Study Results Review: No additional pertinent studies reviewed.  PFT Results Reviewed: reviewed

## 2025-03-14 NOTE — SPEECH THERAPY NOTE
"Speech Language/Pathology  Speech-Language Pathology Progress Note      Patient Name: Severiano Feliciano    Today's Date: 3/14/2025      Subjective:  Pt was lethargic. He was sitting up in chair at bedside. Patient stated \"No more I am full (in [place]ian after trying solids) \".    Objective:  Pt was seen today for dysphagia therapy. Current diet is puree with nectar thick liquids. SLP rec'd regular with nectar 2 days ago and his diet has been puree and nectar since 3/12/25.  Pt was on 2L O2 via nasal cannula. Oral care had already been completed. Focus of today's session was determine potential for diet texture advancement and improve use of strategies to minimize risk of aspiration. Textures offered today included nectar thick liquid via cup, offered pt cookies and pretzels and he opted for cookies.   Swallow function:   Wife confirms that even w/o teeth he ate hard solids at home.  Food that she made he was able to eat.  Today per the staff in room he was able to complete the meal w/o issue.    He self fed (with assistance) the cookies and nectar thick juice-fair breakdown of the solid.  Pt able to clear the oral cavity w/ mult swallows and sips of the nectar juice.  Difficulty self feeding the juice- takes small controlled sips.  No overt coughing.  Only able to take 2 full cookies at this time.      Assessment:  Pt without teeth but is able to breakdown at least cookies today.  Previous recommendation was for regular diet but the diet has not yet been upgraded.  Would f/u with a regular tray as able to assess for any other needed changes.      Plan:  Change diet texture to regular-messaged MD to change.   Choose softer material and cut up for the pt as needed  Continue w/ nectar for now  Will follow with a tray of regular (?meat) as able  "

## 2025-03-14 NOTE — CONSULTS
Consultation - Geriatrics   Severiano Feliciano 89 y.o. male MRN: [id number]  Unit/Bed#: E4 -01 Encounter: [id number]      Assessment/Plan    Cognitive Screening  Patient appears to have a documented history of moderate vascular dementia though there does not appear to be any cognitive workup in the outpatient setting   Timeline of diagnosis and progression unclear but first documentation of this was in May 2020 when he was noted to have mild-to-moderate dementia  Patients wife at the bedside today notes that he is typically alert and oriented x 3-4 at baseline  She notes he does get more confused when he is hypercapnic   He does not appear to be on any medication for this at baseline   He has been primarily alert and oriented to person while inpatient   Please see cause considerations for acute confusion as discussed below  Patient is on the phone with family and is only partly participating in the exam   Patients wife at the bedside notes that his mentation has improved and he appears to be at his cognitive baseline   Most recent TSH on 3/9/2025 noted to be 0.237  Most recent vitamin B 12 level on 12/7/2023 noted to be 317  Consider checking on routine labs and supplementing if < 400  CT of the head on 3/9/2025 revealed mild microangiopathic changes   No MoCA or cognitive testing noted in epic  Would consider outpatient testing once discharged and back to cognitive baseline to determine his current baseline   Maintain delirium precautions as discussed below  Redirect and reorient as needed  Keep physically, mentally, and socially active     Delirium Precautions   Baseline mentation: alert and oriented x 3-4 (per patients wife)  Patient is at high risk secondary to age, underlying dementia/cognitive impairment, acute on chronic respiratory failure with hypoxia and hypercapnia, aspiration pneumonia, antibiotic use, vision impairment, hearing impairment, and hospitalization   Maintain delirium precautions    Provide redirection, reorientation, and distraction techniques  Maintain fall and safety precautions   Assist with ADLs/IADLs  Avoid deliriogenic medications such as tramadol, benzodiazepines, anticholinergics, benadryl  Treat pain using geriatric pain protocol   Encourage oral hydration and nutrition   Monitor for constipation and urinary retention   Implement sleep hygiene and limit night time interruptions   Maintain sleep-wake cycle   Encourage early and frequent mobilization   Most recent EKG on 3/11/2025 revealed a QTc interval of 416  Patient does have a documented allergy to Zyprexa listed as tongue swelling   There is cross-reaction with other antipsychotics so would avoid antipsychotics all together   If all other interventions are unsuccessful for acute agitation and behaviors, can consider Depakote 125 mg once (may use IV Depacon if patient is not taking oral medication)   Would avoid benzodiazepines such as Ativan as these can worsen delirium     Deconditioning   Baseline function: needs assistance with ADLs and IADLs  Patient is at increased risk for deconditioning secondary to underlying dementia/cognitive impairment, acute on chronic respiratory failure with hypoxia and hypercapnia, aspiration pneumonia, antibiotic use, vision impairment, hearing impairment, weakness, gait dysfunction, and hospitalization   Continue to optimize diet, hydration, and mobility for healing   GFR 84 on labs today  Patient has no documented history of CKD   Keep hydrated   Chronic Diastolic Congestive Heart Failure   Most recent echo on 4/11/2023 revealed an EF of 66%   He does not appear to be on any diuretics at baseline   He appears euvolemic on exam today   Consider low sodium diet   Continue to monitor weights and I&O  Type II Diabetes without Long-Term Insulin Use   Most recent hemoglobin A1C on 3/9/2025 noted to be 6.1  Blood sugar log reviewed and blood sugars have been acceptable   It does not appear that he is  taking any medication for this at baseline   He is on Humalog SSI with meals here   Consider diabetic diet and continue SSI   Avoid hypoglycemia   Monitor for signs and symptoms of infection, dehydration, DVT, and skin breakdown    Frailty   Clinical Frail Scale: 6- Moderately Frail  Need help with all outside activities  Need help with stairs and bathing  May need assistance with dressing  Most recent albumin on 3/9/2025 noted to be 3.4  Consider nutrition consult  Encourage protein supplementation     Ambulatory Dysfunction/Falls  Patients wife notes no recent falls at home   She states that he does ambulate with a walker at times   PT/OT consulted to assist with strengthening/mobility and assist with discharge planning to appropriate level of care  Assess patient frequently for physical needs, encourage use of assistant devices as needed and directed by PT/OT  Identify cognitive and physical deficits and behaviors that affect risk of falls  Consider moving patient closer to nursing station to monitor more closely for impulsive behavior which may increase risk of falls  [place] fall and safety precautions   Educate patient/family on patient safety including physical limitations and importance of using call bell for assistance   Modify environment to reduce risk of injury including disconnecting from pole when not in use, ensuring adequate lighting in room and restroom, ensuring that path to restroom is clear and free of trip hazards  Out of bed as tolerated    Impaired Vision   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   He does wear eyeglasses at baseline   Recommend use of corrective lenses at all appropriate times  Encourage adequate lighting and encourage use of assistance with ambulation  Keep personal belongings close to avoid reaching  Encourage appropriate footwear at all times  Recommend large font for printed materials provided to patient    Impaired Hearing   Patient  has a documented history of bilateral hearing loss   Son previously noted that he did undergo surgery for hearing loss and had been doing better   He does follow with ENT in the outpatient setting   Hearing impairment strongly correlated with depression, cognitive impairment, delirium and falls in the older adult  Use hearing aids or sound amplifier  Speak face to face  Use clear dictation and enunciation of words    Dentition/Appetite   Patient does not wear dentures   His wife notes that he has a good appetite at baseline and states his appetite has been improving   Per chart review, patient has been primarily consuming 75% of meals documented   Ensure meal consistency is appropriate for all abilities   Consider nutrition consult   Continue aspiration precautions     Elimination   Patient is primarily continent of bowel and bladder at baseline  He was previously noted to be incontinent at times when his oxygen levels are low   He appears to be voiding here without difficulty   PCA on patients 1:1 notes that he did not void for her yet today though there is documentation of urine output this morning around 1100  Recommend urinary retention protocol if patient is not voiding   Encourage oral intake   Last documented bowel movement was yesterday   He is not currently on a bowel regimen   Monitor for constipation and urinary retention     Insomnia   Patients wife notes no difficulties sleeping at home   His son on a prior admission noted that he gets up several times to use the bathroom at night   Son also previously noted that he was sleeping quite a bit during the day as well   He does not appear to take any medication for sleep at baseline   Nursing notes no acute issues or events overnight   He is on melatonin 6 mg daily at bedtime here   First line is behavioral therapy   Avoid sedative hypnotics including benzodiazepines and benadryl  Encourage staying awake during the day   Encourage daytime activities and  morning exercise   Decrease or eliminate daytime naps   Avoid caffeine especially during late afternoon and evening hours  Establish a nighttime routine  Implement sleep hygiene and limit nighttime interruptions    Anxiety/Depression  Patient has no documented history of anxiety or depression  He is not currently on any medication for this   Mood appears stable on exam today   Continue supportive care     Acute Exacerbation of COPD  Patient presented to the hospital with complaints of SOB  He was noted to be hypoxic and required intubation and mechanical ventilation   He was transferred to [place] ICU for closer monitoring   Chest x-ray revealed right lower lobe infiltrate possibly representing pneumonia   He completed a course of azithromycin and remains on day 6/7 of Ceftriaxone   He remains on scheduled nebulizers  Per chart review, he had been on steroids but these have since been discontinued   He is currently on 5 L of NC and he does not appear to be in any acute distress  He is having a conversation with someone on the phone and does not appear to be short of breath  Patients wife at the bedside notes that his condition is improved and he appears to be back to baseline   Patient is supposed to wear BiPAP at bedtime at home but has been non-compliant despite counseling per chart review   BiPAP is ordered her   Encourage BiPAP as tolerated   Patient follows with pulmonology in the outpatient setting (last office visit was on 9/20/2024)  It was noted that there was suspected steroid induced delirium during a prior admission so systemic steroids had been discontinued   Appears he is on the Breztri inhaler daily with prn albuterol nebulizers   Palliative care referral was placed during last pulmonary visit in September 2024 as 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   Family was agreeable  Appears that palliative has called and has been attempting to schedule hospital follow-up visit  Appears hospice has been discussed  with PCP and pulmonology but family has declined   Maintain oxygen saturation > 88%  Management per pulmonology   Would recommend continued outpatient follow-up with palliative care and pulmonology on discharge     Aspiration Pneumonia  Patient presented to the hospital with complaints of SOB  He was noted to be hypoxic and required intubation and mechanical ventilation   He was transferred to [place] ICU for closer monitoring   Chest x-ray revealed right lower lobe infiltrate possibly representing pneumonia   He is now on day 6/7 of Ceftriaxone and he completed a dose of azithromycin   Speech therapy is consulted and following here   Maintain aspiration precautions   Management per primary team     Home Safety  Patient resides at home with his wife and son (previously son was noted to be a paid caregiver)  He needs assistance with ADLs and IADLs     Advanced Care Planning  Level 1 Full Code     Home Medication Review   [facility] ([phone])  Losartan 25 mg BID  Pantoprazole 40 mg once daily  Vit D 1.25 mg once weekly  Azithromycin 250 mg once daily  Senokot-S 1 tablet once daily   Amlodipine 5 mg once daily - filled 2/8 for 30 day supply so needs refill  Breztri inhaler  2 puffs BID (last filled in January)  Albuterol inhaler every 6 hours as needed (last filled in January)    I have personally reviewed this medication list with the patients pharmacy listed above.       History of Present Illness   Physician Requesting Consult: Benson Gold DO  Reason for Consult / Principal Problem: Dysphagia  Hx and PE limited by: Patient on phone conversation and did not participate much in the exam     HPI: Severiano Feliciano is a 89 y.o. year old male who has hypertension, hyperlipidemia, severe COPD, CHF, diabetes, JAY, and ambulatory dysfunction and presented the hospital with shortness of breath and altered mental status.  He was intubated prior to arrival to [facility] and he  was noted to have a fever.  X-ray imaging revealed a right sided pneumonia.  The patient was transferred to [facility] for ICU level care.  He was noted to have continued fever and hypotension on arrival to the ICU.  He was started on IV antibiotics and fluids and was maintained on a ventilator.  He has since come off the ventilator and is now on 5 L of supplemental oxygen.  He has been treated with ceftriaxone and is on day 6 of 7.  He completed a dose of azithromycin already.  Pulmonology has continued to follow while inpatient.  Pneumonia was suspected be secondary to aspiration.  Speech therapy has been consulted and following.  Geriatrics is being consulted for dysphagia.    Information was obtained from patient's wife at the bedside in addition to a prior consult note that I personally completed on 5/21/2024.  The patient is currently residing at home with his son and his wife.  He does require assistance with ADLs.  Previously his son noted that he was a paid caregiver for the patient.  It is unclear if that is still the case.  The patient no longer drives.  The patient's wife notes no cognitive deficits at baseline though she states that he does get a bit confused when his CO2 was elevated.  Previously in May 2024 he was noted to be alert and oriented x 3-4 with no behaviors at home.  A prior diagnosis of dementia appears to be documented in May 2020 though the patient has not undergone any cognitive workup that I can see.  The patient does have a longstanding history of COPD and previously had a BiPAP at home that he was noncompliant with.  She notes no recent falls at home and states that he does ambulate with a walker as needed at baseline.  Previously the patient was noted to have vision deficits as he previously had an accident when he was little and had residual deficits in the right eye.  He does wear eyeglasses at baseline.  The patient does have a history of hearing impairment, however, it  was previously noted that he had surgery and his hearing had improved.  He is following with ENT in the outpatient setting.  The patient does not wear dentures and his wife notes that he has a good appetite at baseline.  She is not aware of any difficulties with voiding or constipation.  He is primarily continent of bowel and bladder at baseline.  He was previously noted to be incontinent at times when his oxygen levels decrease.  The patient's wife notes no difficulties with sleep at baseline.  Previously he was noted to have some difficulty with sleep as he would wake up frequently to use the bathroom during the night.  His son also noted that he was sleeping a lot during the day.      The patient was seen evaluated today at the bedside for geriatric consult.  He is noted sitting up in his bedside chair speaking on the phone in no acute distress.  He is very occupied with this and does not participate much in the exam today.  He does not appear to be in any pain or discomfort.    Care was coordinated with the patient's nurse Nilda.  She notes no acute issues or events overnight.    Care was also coordinated with Dr. Gold with internal medicine.      Inpatient consult to Gerontology  Consult performed by: RAQUEL Hudson  Consult ordered by: Benson Gold DO        Review of Systems   Unable to perform ROS: Other (patient occupied on the phone and not participating much in the exam)       Historical Information   Past Medical History:   Diagnosis Date    Acute metabolic encephalopathy 06/13/2020    Age-related cataract of right eye 04/04/2023    patient is medically cleared and presents with low risk of complication with this upcoming R cataract surgery.    Anemia     Aneurysm, aorta, thoracic (HCC)     Appendicolith     Ascending aortic aneurysm (HCC)     3.7    Asthma     BPH (benign prostatic hyperplasia)     CAD (coronary artery disease)     noted on CT scan    CHF (congestive heart failure) (HCC)      COPD (chronic obstructive pulmonary disease) ([facility])     Delirium 04/25/2024    Descending thoracic aortic aneurysm (HCC)     Diabetes mellitus (HCC)     Diverticulosis     Former tobacco use     GERD (gastroesophageal reflux disease)     History of DVT (deep vein thrombosis)     Left leg    History of transfusion     Hypertension     Hypoxemia 04/30/2023    Inguinal hernia     right    Nephrolithiasis     Oxygen dependent     2LNC    Oxygen dependent     Pneumonia     Pre-diabetes     Prostate calculus     PVD (peripheral vascular disease) ([facility])     Recurrent right inguinal hernia w incarcertion 01/04/2023    SIRS (systemic inflammatory response syndrome) ([facility]) 09/04/2024    Solitary pulmonary nodule on lung CT 02/02/2025    8 x 5 mm right middle lobe nodule, most recently 5 x 3 mm (3/163) and about 4 x 2 mm in January 2024.       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[facility])     Varicose vein of leg     b/l     Past Surgical History:   Procedure Laterality Date    CARDIAC SURGERY      ESOPHAGOGASTRODUODENOSCOPY      HERNIA REPAIR Right 1/21/2019    Procedure: REPAIR HERNIA INGUINAL WITH MESH;  Surgeon: David Lowry MD;  Location:  MAIN OR;  Service: General    HERNIA REPAIR Right 7/22/2023    Procedure: REPAIR RECURRENT INCARERATED HERNIA INGUINAL OPEN, RIGHT ORCHIECTOMY;  Surgeon: Mason Bertrand MD;  Location: [facility];  Service: General    INGUINAL HERNIA REPAIR Bilateral     IR TEVAR  12/27/2018    WI EVASC RPR DTA COVERAGE ART ORIGIN 1ST ENDOPROSTH N/A 12/27/2018    Procedure: TEVAR - endovascular thoracic aortic aneurysm repair;  Surgeon: Gladis Ortega MD;  Location: [facility];  Service: Vascular    THORACIC AORTIC ANEURYSM REPAIR  12/27/2018    VARICOSE VEIN SURGERY Bilateral     vein stripping     Social History   Social History  "    Substance and Sexual Activity   Alcohol Use Never     Social History     Substance and Sexual Activity   Drug Use No     Social History     Tobacco Use   Smoking Status Former    Current packs/day: 0.00    Average packs/day: 1 pack/day for 35.0 years (35.0 ttl pk-yrs)    Types: Cigarettes    Start date:     Quit date:     Years since quittin.2   Smokeless Tobacco Never       Family History:   Family History   Problem Relation Age of Onset    Tuberculosis Mother     No Known Problems Father     Cancer Sister     Diabetes Family     Hypertension Family           Allergies   Allergen Reactions    Penicillins Hives, Itching and Rash    Lisinopril Rash     Side pains and rash     Lasix [Furosemide] Other (See Comments)     unknown    Zyprexa [Olanzapine] Tongue Swelling    Amlodipine Rash       Objective   Vitals: Blood pressure 144/79, pulse 68, temperature 98.2 °F (36.8 °C), temperature source Temporal, resp. rate 16, height 5' 2.99\" (1.6 m), weight 57.8 kg (127 lb 6.8 oz), SpO2 98%.,Body mass index is 22.58 kg/m².      Physical Exam  Vitals and nursing note reviewed.   Constitutional:       General: He is not in acute distress.     Appearance: He is not ill-appearing.   HENT:      Head: Normocephalic.   Cardiovascular:      Rate and Rhythm: Normal rate and regular rhythm.   Pulmonary:      Effort: Pulmonary effort is normal. No respiratory distress.      Comments: Currently on 5 L of NC  Conversing on the phone in no acute distress  Abdominal:      General: Bowel sounds are normal. There is no distension.      Palpations: Abdomen is soft.   Musculoskeletal:      Right lower leg: No edema.      Left lower leg: No edema.   Skin:     General: Skin is warm and dry.   Neurological:      Mental Status: He is alert.         Lab Results:   Results from last 7 days   Lab Units 25  0638   WBC Thousand/uL 8.20   HEMOGLOBIN g/dL 10.8*   HEMATOCRIT % 35.9*   PLATELETS Thousands/uL 123*        Results from " "last 7 days   Lab Units 03/14/25  0638 03/10/25  0547 03/09/25  1951   POTASSIUM mmol/L 3.2*   < > 3.4*   CHLORIDE mmol/L 99   < > 100   CO2 mmol/L 43*   < > 37*   BUN mg/dL 20   < > 18   CREATININE mg/dL 0.71   < > 0.68   CALCIUM mg/dL 8.8   < > 8.6   ALK PHOS U/L  --   --  45   ALT U/L  --   --  18   AST U/L  --   --  30    < > = values in this interval not displayed.       Imaging Studies: Results Review Statement: I reviewed radiology reports from this admission including: chest xray, CT chest, CT abdomen/pelvis, and CT head.  EKG, Pathology, and Other Studies: Results Review Statement: I reviewed AM labs and EKG.  VTE Prophylaxis: VTE covered by:    None       Code Status: Level 1 - Full Code      Please note:  Voice-recognition software may have been used in the preparation of this document.  Occasional wrong word or \"sound-alike\" substitutions may have occurred due to the inherent limitations of voice recognition software.  Interpretation should be guided by context.    "

## 2025-03-14 NOTE — ASSESSMENT & PLAN NOTE
Due to aspiration pneumonia in the setting of his severe COPD  Baseline oxygen requirement of 2 to 3 L of will continue to wean back  Has BiPAP at home which is ordered at night but he is significant noncompliant  Patient will likely be discharged home tomorrow if remains clinically stable

## 2025-03-14 NOTE — ASSESSMENT & PLAN NOTE
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

## 2025-03-14 NOTE — ASSESSMENT & PLAN NOTE
Due to aspiration pneumonia in setting of severe COPD  Now on his baseline oxygen requirement 2-3 lpm  He has BIPAP at home and ordered at night but he is non-adherent despite counseling

## 2025-03-14 NOTE — PLAN OF CARE
Problem: PAIN - ADULT  Goal: Verbalizes/displays adequate comfort level or baseline comfort level  Description: Interventions:  - Encourage patient to monitor pain and request assistance  - Assess pain using appropriate pain scale  - Administer analgesics based on type and severity of pain and evaluate response  - Implement non-pharmacological measures as appropriate and evaluate response  - Consider cultural and social influences on pain and pain management  - Notify physician/advanced practitioner if interventions unsuccessful or patient reports new pain  Outcome: Progressing     Problem: Nutrition/Hydration-ADULT  Goal: Nutrient/Hydration intake appropriate for improving, restoring or maintaining nutritional needs  Description: Monitor and assess patient's nutrition/hydration status for malnutrition. Collaborate with interdisciplinary team and initiate plan and interventions as ordered.  Monitor patient's weight and dietary intake as ordered or per policy. Utilize nutrition screening tool and intervene as necessary. Determine patient's food preferences and provide high-protein, high-caloric foods as appropriate.     INTERVENTIONS:  - Monitor oral intake, urinary output, labs, and treatment plans  - Assess nutrition and hydration status and recommend course of action  - Evaluate amount of meals eaten  - Assist patient with eating if necessary   - Allow adequate time for meals  - Recommend/ encourage appropriate diets, oral nutritional supplements, and vitamin/mineral supplements  - Order, calculate, and assess calorie counts as needed  - Recommend, monitor, and adjust tube feedings and TPN/PPN based on assessed needs  - Assess need for intravenous fluids  - Provide specific nutrition/hydration education as appropriate  - Include patient/family/caregiver in decisions related to nutrition  Outcome: Progressing     Problem: SAFETY,RESTRAINT: NV/NON-SELF DESTRUCTIVE BEHAVIOR  Goal: Remains free of harm/injury  (restraint for non violent/non self-detsructive behavior)  Description: INTERVENTIONS:  - Instruct patient/family regarding restraint use   - Assess and monitor physiologic and psychological status   - Provide interventions and comfort measures to meet assessed patient needs   - Identify and implement measures to help patient regain control  - Assess readiness for release of restraint   Outcome: Progressing  Goal: Returns to optimal restraint-free functioning  Description: INTERVENTIONS:  - Assess the patient's behavior and symptoms that indicate continued need for restraint  - Identify and implement measures to help patient regain control  - Assess readiness for release of restraint   Outcome: Progressing     Problem: INFECTION - ADULT  Goal: Absence or prevention of progression during hospitalization  Description: INTERVENTIONS:  - Assess and monitor for signs and symptoms of infection  - Monitor lab/diagnostic results  - Monitor all insertion sites, i.e. indwelling lines, tubes, and drains  - Monitor endotracheal if appropriate and nasal secretions for changes in amount and color  - [place] appropriate cooling/warming therapies per order  - Administer medications as ordered  - Instruct and encourage patient and family to use good hand hygiene technique  - Identify and instruct in appropriate isolation precautions for identified infection/condition  Outcome: Progressing  Goal: Absence of fever/infection during neutropenic period  Description: INTERVENTIONS:  - Monitor WBC    Outcome: Progressing     Problem: SAFETY ADULT  Goal: Patient will remain free of falls  Description: INTERVENTIONS:  - Educate patient/family on patient safety including physical limitations  - Instruct patient to call for assistance with activity   - Consult OT/PT to assist with strengthening/mobility   - Keep Call bell within reach  - Keep bed low and locked with side rails adjusted as appropriate  - Keep care items and personal  belongings within reach  - Initiate and maintain comfort rounds  - Make Fall Risk Sign visible to staff  - Offer Toileting every 2 Hours, in advance of need  - Initiate/Maintain bed alarm  - Obtain necessary fall risk management equipment:   - Apply yellow socks and bracelet for high fall risk patients  - Consider moving patient to room near nurses station  Outcome: Progressing  Goal: Maintain or return to baseline ADL function  Description: INTERVENTIONS:  -  Assess patient's ability to carry out ADLs; assess patient's baseline for ADL function and identify physical deficits which impact ability to perform ADLs (bathing, care of mouth/teeth, toileting, grooming, dressing, etc.)  - Assess/evaluate cause of self-care deficits   - Assess range of motion  - Assess patient's mobility; develop plan if impaired  - Assess patient's need for assistive devices and provide as appropriate  - Encourage maximum independence but intervene and supervise when necessary  - Involve family in performance of ADLs  - Assess for home care needs following discharge   - Consider OT consult to assist with ADL evaluation and planning for discharge  - Provide patient education as appropriate  Outcome: Progressing  Goal: Maintains/Returns to pre admission functional level  Description: INTERVENTIONS:  - Perform AM-PAC 6 Click Basic Mobility/ Daily Activity assessment daily.  - Set and communicate daily mobility goal to care team and patient/family/caregiver.   - Collaborate with rehabilitation services on mobility goals if consulted  - Perform Range of Motion 3 times a day.  - Reposition patient every 2 hours.  - Dangle patient 3 times a day  - Stand patient 3 times a day  - Ambulate patient 3 times a day  - Out of bed to chair 3 times a day   - Out of bed for meals 3 times a day  - Out of bed for toileting  - Record patient progress and toleration of activity level   Outcome: Progressing     Problem: DISCHARGE PLANNING  Goal: Discharge to  home or other facility with appropriate resources  Description: INTERVENTIONS:  - Identify barriers to discharge w/patient and caregiver  - Arrange for needed discharge resources and transportation as appropriate  - Identify discharge learning needs (meds, wound care, etc.)  - Arrange for interpretive services to assist at discharge as needed  - Refer to Case Management Department for coordinating discharge planning if the patient needs post-hospital services based on physician/advanced practitioner order or complex needs related to functional status, cognitive ability, or social support system  Outcome: Progressing     Problem: Knowledge Deficit  Goal: Patient/family/caregiver demonstrates understanding of disease process, treatment plan, medications, and discharge instructions  Description: Complete learning assessment and assess knowledge base.  Interventions:  - Provide teaching at level of understanding  - Provide teaching via preferred learning methods  Outcome: Progressing

## 2025-03-14 NOTE — PHYSICAL THERAPY NOTE
PT EVALUATION    Pt. Name: Severiano Feliciano  Pt. Age: 89 y.o.  MRN: [id number]  LENGTH OF STAY: 5      Admitting Diagnoses:   Pneumonia [J18.9]    Past Medical History:   Diagnosis Date    Acute metabolic encephalopathy 06/13/2020    Age-related cataract of right eye 04/04/2023    patient is medically cleared and presents with low risk of complication with this upcoming R cataract surgery.    Anemia     Aneurysm, aorta, thoracic (HCC)     Appendicolith     Ascending aortic aneurysm (HCC)     3.7    Asthma     BPH (benign prostatic hyperplasia)     CAD (coronary artery disease)     noted on CT scan    CHF (congestive heart failure) (HCC)     COPD (chronic obstructive pulmonary disease) (HCC)     Delirium 04/25/2024    Descending thoracic aortic aneurysm (HCC)     Diabetes mellitus (HCC)     Diverticulosis     Former tobacco use     GERD (gastroesophageal reflux disease)     History of DVT (deep vein thrombosis)     Left leg    History of transfusion     Hypertension     Hypoxemia 04/30/2023    Inguinal hernia     right    Nephrolithiasis     Oxygen dependent     2LNC    Oxygen dependent     Pneumonia     Pre-diabetes     Prostate calculus     PVD (peripheral vascular disease) ([facility])     Recurrent right inguinal hernia w incarcertion 01/04/2023    SIRS (systemic inflammatory response syndrome) ([facility]) 09/04/2024    Solitary pulmonary nodule on lung CT 02/02/2025    8 x 5 mm right middle lobe nodule, most recently 5 x 3 mm (3/163) and about 4 x 2 mm in January 2024.       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[facility])     Varicose vein of leg     b/l       Past Surgical History:   Procedure Laterality Date    CARDIAC SURGERY      ESOPHAGOGASTRODUODENOSCOPY      HERNIA REPAIR Right 1/21/2019    Procedure: REPAIR HERNIA INGUINAL WITH MESH;   Surgeon: David Lowry MD;  Location:  MAIN OR;  Service: General    HERNIA REPAIR Right 7/22/2023    Procedure: REPAIR RECURRENT INCARERATED HERNIA INGUINAL OPEN, RIGHT ORCHIECTOMY;  Surgeon: Mason Bertrand MD;  Location: [facility];  Service: General    INGUINAL HERNIA REPAIR Bilateral     IR TEVAR  12/27/2018    NC EVASC RPR DTA COVERAGE ART ORIGIN 1ST ENDOPROSTH N/A 12/27/2018    Procedure: TEVAR - endovascular thoracic aortic aneurysm repair;  Surgeon: Gladis Ortega MD;  Location: [facility];  Service: Vascular    THORACIC AORTIC ANEURYSM REPAIR  12/27/2018    VARICOSE VEIN SURGERY Bilateral     vein stripping       Imaging Studies:  FL barium swallow video w speech   Final Result by  (03/12 0740)      FL barium swallow video w speech   Final Result by SYSTEMGIOVANNA, SHANA (03/12 8249)      XR chest portable ICU   Final Result by Fabian Arias MD (03/11 1144)      Congestive changes with right lower lobe infiltrate and bilateral small effusions more on the right            Workstation performed: [id number]               03/14/25 0953   PT Last Visit   PT Visit Date 03/14/25   Note Type   Note type Evaluation   Pain Assessment   Pain Assessment Tool FLACC   Pain Score No Pain   Pain Rating: FLACC (Rest) - Face 0   Pain Rating: FLACC (Rest) - Legs 0   Pain Rating: FLACC (Rest) - Activity 0   Pain Rating: FLACC (Rest) - Cry 0   Pain Rating: FLACC (Rest) - Consolability 0   Score: FLACC (Rest) 0   Pain Rating: FLACC (Activity) - Face 0   Pain Rating: FLACC (Activity) - Legs 0   Pain Rating: FLACC (Activity) - Activity 0   Pain Rating: FLACC (Activity) - Cry 0   Pain Rating: FLACC (Activity) - Consolability 0   Score: FLACC (Activity) 0   Restrictions/Precautions   Weight Bearing Precautions Per Order No   Other Precautions 1:1;Cognitive;Chair Alarm;Bed Alarm;Multiple lines;Telemetry;O2;Fall Risk  (6L O2 NC)   Home Living   Type of Home House   Home Layout Two level;Bed/bath upstairs;Other  (Comment)  (FOS to 2nd floor bed/bath)   Home Equipment Walker;Wheelchair-manual;Other (Comment)  (home O2 3-5L at baseline)   Additional Comments pt poor historian; above info obtained from wife   Prior Function   Level of [place] Needs assistance with functional mobility;Needs assistance with ADLs;Needs assistance with IADLS  (ambulates w/ RW w/ assistance household distances; uses w/c when out in the community)   Lives With Spouse   Receives Help From Family  (son who visits daily to provide assistance)   Falls in the last 6 months   (0 per wife)   Vocational Retired   Comments (-) ; son takes pt to appointment & assist pt w/ amb & stair management; pt (-) home alone; pt poor historian; above info provided by wife   General   Additional Pertinent History vascular dementia   Family/Caregiver Present Yes  (wife)   Cognition   Overall Cognitive Status Impaired   Arousal/Participation Alert   Orientation Level Oriented to person;Disoriented to place;Disoriented to time;Disoriented to situation   Following Commands Follows one step commands with increased time or repetition   Comments cooperative; pt Italian speaking only; wife provided translation & information   Subjective   Subjective Pt agreeable to PT/OT evals.   RUE Assessment   RUE Assessment   (refer to OT)   LUE Assessment   LUE Assessment   (refer to OT)   RLE Assessment   RLE Assessment X  (pt unable to follow MMT commands; appears WFL as observed functionally)   LLE Assessment   LLE Assessment X  (pt unable to follow MMT commands; appears WFL as observed functionally)   Bed Mobility   Supine to Sit 3  Moderate assistance   Additional items Assist x 1;HOB elevated;Bedrails;Increased time required;Verbal cues;LE management   Additional Comments cues for techniques & safety   Transfers   Sit to Stand 4  Minimal assistance   Additional items Assist x 1;Increased time required;Verbal cues   Stand to Sit 4  Minimal assistance   Additional items Assist  x 1;Increased time required;Verbal cues   Additional Comments cues for techniques & safety   Ambulation/Elevation   Gait pattern Forward Flexion;Wide RICHARD;Excessively slow;Decreased hip extension  (dec knee extension)   Gait Assistance 4  Minimal assist   Additional items Assist x 1;Verbal cues;Tactile cues   Assistive Device Rolling walker;Other (Comment)  (+ chair follow)   Distance 50'x1   Ambulation/Elevation Additional Comments unsteady gait but no gross LOB noted; pt's wife notes that pt's current gait is at baseline   Balance   Static Sitting Fair   Dynamic Sitting Fair -   Static Standing Fair -  (w/ RW)   Dynamic Standing Poor +  (w/ RW)   Ambulatory Poor +  (w/ RW)   Endurance Deficit   Endurance Deficit Yes   Endurance Deficit Description fatigue   Activity Tolerance   Activity Tolerance Patient limited by fatigue;Treatment limited secondary to medical complications (Comment)   Medical Staff Made Aware OTR [place]   Nurse Made Aware yes, Nilda   Assessment   Prognosis Fair   Problem List Decreased strength;Decreased endurance;Impaired balance;Decreased mobility;Impaired judgement;Decreased cognition;Decreased safety awareness;Impaired hearing  (?close to baseline deficits)   Assessment Pt. 89 y.o.male admitted for Acute exacerbation of chronic obstructive pulmonary disease (COPD) (HCC) w/ Pneumonia, toxic metabolic encephalopathy & hyponatremia. Pt referred to PT for mobility assessment & D/C planning. Please see above for information re: home set-up & PLOF as well as objective findings during PT assessment. PTA, pt's wife reports pt require assistance w/ overall functional mobility including amb & stair management at baseline. On eval, pt functioning below baseline hence will continue skilled PT to improve function & safety. Pt require modAx1 for bed mobility & minAx1 for transfers & amb w/ RW + cues for techniques & safety. Gait deviations as above but no gross LOB noted. Provided chair follow during amb.  "The patient's AM-PAC Basic Mobility Inpatient Short Form Raw Score is 16. A Raw score of less than or equal to 16 suggests the patient may benefit from discharge to post-acute rehabilitation services. Please also refer to the recommendation of the Physical Therapist for safe discharge planning. From PT standpoint, will recommend Level II (moderate resource intensity) rehab services vs Level III (minimum resource intensity) rehab services, and inc family support at D/C, pending progress. Pt on 6L O2 NC t/o session. No SOB & dizziness reported t/o session. Nsg staff most recent vital signs as follows: /81 (BP Location: Left arm)   Pulse 71   Temp 98.4 °F (36.9 °C) (Temporal)   Resp 16   Ht 5' 2.99\" (1.6 m)   Wt 57.8 kg (127 lb 6.8 oz)   SpO2 91%   BMI 22.58 kg/m² . At end of session, pt OOB in chair in stable condition, call bell & phone in reach, chair alarm activated, all lines intact. Fall precautions reinforced w/ good understanding. CM to follow. Nsg staff to continue to mobilized pt (OOB in chair for all meals & ambulate in room/unit) as tolerated to prevent further decline in function. Will also recommend Restorative for daily amb &/or daily OOB in chair as appropriate. Nsg notified. Co-eval was necessary to complete this PT eval for the pt's best interest given pt's medical acuity & complexity.   Goals   Patient Goals none stated 2* to impaired cognition   STG Expiration Date 03/25/25   Short Term Goal #1 Goals to be met in 10 days; pt will be able to: 1) inc strength & balance by 1/2 grade to improve overall functional mobility & dec fall risk; 2) inc bed mobility to minAx1 for pt to be able to get in/OOB safely w/ proper techniques 100% of the time, to dec caregiver burden & safely function at home; 3) inc transfers to S for pt to transition safely from one surface to another w/o % of the time, to dec caregiver burden & safely function at home; 4) inc amb w/ RW approx. >150' w/ S for pt " to ambulate household distances w/o any % of the time, to dec caregiver burden & safely function at home; 5) negotiate stairs w/ minAx1 for inc safety during stair mgt inside/outside of home & dec caregiver burden; 6) pt/caregiver ed   PT Treatment Day 0   Plan   Treatment/Interventions Functional transfer training;LE strengthening/ROM;Elevations;Therapeutic exercise;Endurance training;Patient/family training;Bed mobility;Gait training;Spoke to nursing;OT;Family   PT Frequency 2-3x/wk   Discharge Recommendation   Rehab Resource Intensity Level, PT II (Moderate Resource Intensity)  (vs III, pending progress)   Equipment Recommended Walker  (pt has)   Additional Comments restorative for daily amb   AM-PAC Basic Mobility Inpatient   Turning in Flat Bed Without Bedrails 3   Lying on Back to Sitting on Edge of Flat Bed Without Bedrails 2   Moving Bed to Chair 3   Standing Up From Chair Using Arms 3   Walk in Room 3   Climb 3-5 Stairs With Railing 2   Basic Mobility Inpatient Raw Score 16   Basic Mobility Standardized Score 38.32   [facility] Highest Level Of Mobility   -HL Goal 5: Stand one or more mins   -HLM Achieved 7: Walk 25 feet or more   End of Consult   Patient Position at End of Consult Bedside chair;Bed/Chair alarm activated;All needs within reach   End of Consult Comments Pt in stable condition. All needs in reach. Chair alarm activated & connected to call bell system.   Hx/personal factors: co-morbidities, inaccessible home, advanced age, mutliple lines, telemetry, use of AD, dec cognition, fall risk, assist w/ ADL's, and O2  Examination: dec mobility, dec balance, dec endurance, dec amb, risk for falls, dec cognition  Clinical: unpredictable (ongoing medical status, abnormal lab values, and risk for falls)  Complexity: high    Kurtis Rockwell

## 2025-03-14 NOTE — PLAN OF CARE
Problem: PAIN - ADULT  Goal: Verbalizes/displays adequate comfort level or baseline comfort level  Description: Interventions:  - Encourage patient to monitor pain and request assistance  - Assess pain using appropriate pain scale  - Administer analgesics based on type and severity of pain and evaluate response  - Implement non-pharmacological measures as appropriate and evaluate response  - Consider cultural and social influences on pain and pain management  - Notify physician/advanced practitioner if interventions unsuccessful or patient reports new pain  Outcome: Progressing     Problem: Nutrition/Hydration-ADULT  Goal: Nutrient/Hydration intake appropriate for improving, restoring or maintaining nutritional needs  Description: Monitor and assess patient's nutrition/hydration status for malnutrition. Collaborate with interdisciplinary team and initiate plan and interventions as ordered.  Monitor patient's weight and dietary intake as ordered or per policy. Utilize nutrition screening tool and intervene as necessary. Determine patient's food preferences and provide high-protein, high-caloric foods as appropriate.     INTERVENTIONS:  - Monitor oral intake, urinary output, labs, and treatment plans  - Assess nutrition and hydration status and recommend course of action  - Evaluate amount of meals eaten  - Assist patient with eating if necessary   - Allow adequate time for meals  - Recommend/ encourage appropriate diets, oral nutritional supplements, and vitamin/mineral supplements  - Order, calculate, and assess calorie counts as needed  - Recommend, monitor, and adjust tube feedings and TPN/PPN based on assessed needs  - Assess need for intravenous fluids  - Provide specific nutrition/hydration education as appropriate  - Include patient/family/caregiver in decisions related to nutrition  Outcome: Progressing     Problem: SAFETY,RESTRAINT: NV/NON-SELF DESTRUCTIVE BEHAVIOR  Goal: Remains free of harm/injury  (restraint for non violent/non self-detsructive behavior)  Description: INTERVENTIONS:  - Instruct patient/family regarding restraint use   - Assess and monitor physiologic and psychological status   - Provide interventions and comfort measures to meet assessed patient needs   - Identify and implement measures to help patient regain control  - Assess readiness for release of restraint   Outcome: Progressing  Goal: Returns to optimal restraint-free functioning  Description: INTERVENTIONS:  - Assess the patient's behavior and symptoms that indicate continued need for restraint  - Identify and implement measures to help patient regain control  - Assess readiness for release of restraint   Outcome: Progressing     Problem: INFECTION - ADULT  Goal: Absence or prevention of progression during hospitalization  Description: INTERVENTIONS:  - Assess and monitor for signs and symptoms of infection  - Monitor lab/diagnostic results  - Monitor all insertion sites, i.e. indwelling lines, tubes, and drains  - Monitor endotracheal if appropriate and nasal secretions for changes in amount and color  - [place] appropriate cooling/warming therapies per order  - Administer medications as ordered  - Instruct and encourage patient and family to use good hand hygiene technique  - Identify and instruct in appropriate isolation precautions for identified infection/condition  Outcome: Progressing  Goal: Absence of fever/infection during neutropenic period  Description: INTERVENTIONS:  - Monitor WBC    Outcome: Progressing     Problem: SAFETY ADULT  Goal: Patient will remain free of falls  Description: INTERVENTIONS:  - Educate patient/family on patient safety including physical limitations  - Instruct patient to call for assistance with activity   - Consult OT/PT to assist with strengthening/mobility   - Keep Call bell within reach  - Keep bed low and locked with side rails adjusted as appropriate  - Keep care items and personal  belongings within reach  - Initiate and maintain comfort rounds  - Make Fall Risk Sign visible to staff  - Offer Toileting every 2 Hours, in advance of need  - Initiate/Maintain bed alarm  - Obtain necessary fall risk management equipment:   - Apply yellow socks and bracelet for high fall risk patients  - Consider moving patient to room near nurses station  Outcome: Progressing  Goal: Maintain or return to baseline ADL function  Description: INTERVENTIONS:  -  Assess patient's ability to carry out ADLs; assess patient's baseline for ADL function and identify physical deficits which impact ability to perform ADLs (bathing, care of mouth/teeth, toileting, grooming, dressing, etc.)  - Assess/evaluate cause of self-care deficits   - Assess range of motion  - Assess patient's mobility; develop plan if impaired  - Assess patient's need for assistive devices and provide as appropriate  - Encourage maximum independence but intervene and supervise when necessary  - Involve family in performance of ADLs  - Assess for home care needs following discharge   - Consider OT consult to assist with ADL evaluation and planning for discharge  - Provide patient education as appropriate  Outcome: Progressing  Goal: Maintains/Returns to pre admission functional level  Description: INTERVENTIONS:  - Perform AM-PAC 6 Click Basic Mobility/ Daily Activity assessment daily.  - Set and communicate daily mobility goal to care team and patient/family/caregiver.   - Collaborate with rehabilitation services on mobility goals if consulted  - Perform Range of Motion 3 times a day.  - Reposition patient every 2 hours.  - Dangle patient 3 times a day  - Stand patient 3 times a day  - Ambulate patient 3 times a day  - Out of bed to chair 3 times a day   - Out of bed for meals 3 times a day  - Out of bed for toileting  - Record patient progress and toleration of activity level   Outcome: Progressing     Problem: DISCHARGE PLANNING  Goal: Discharge to  home or other facility with appropriate resources  Description: INTERVENTIONS:  - Identify barriers to discharge w/patient and caregiver  - Arrange for needed discharge resources and transportation as appropriate  - Identify discharge learning needs (meds, wound care, etc.)  - Arrange for interpretive services to assist at discharge as needed  - Refer to Case Management Department for coordinating discharge planning if the patient needs post-hospital services based on physician/advanced practitioner order or complex needs related to functional status, cognitive ability, or social support system  Outcome: Progressing     Problem: Knowledge Deficit  Goal: Patient/family/caregiver demonstrates understanding of disease process, treatment plan, medications, and discharge instructions  Description: Complete learning assessment and assess knowledge base.  Interventions:  - Provide teaching at level of understanding  - Provide teaching via preferred learning methods  Outcome: Progressing

## 2025-03-14 NOTE — ASSESSMENT & PLAN NOTE
Not currently in exacerbation. Off steroids.  Continue bronchodilators xopenex/atrovent TID  On discharge resume budesonide BID and duonebs TID  Patient has a scheduled appt 3/19/25 which will work for hospital follow-up

## 2025-03-14 NOTE — ASSESSMENT & PLAN NOTE
Lab Results   Component Value Date    HGBA1C 6.1 (H) 03/09/2025       Recent Labs     03/13/25  1819 03/13/25 2001 03/14/25  0136 03/14/25  1201   POCGLU 155* 141* 112 199*       Blood Sugar Average: Last 72 hrs:  (P) 130.75  Continue sliding scale and basal bolus protocol

## 2025-03-14 NOTE — ASSESSMENT & PLAN NOTE
Day 5/7 antibiotics- cefepime initially now ceftriaxone.  Can switch to PO if discharged before completion- cefpodoxime  Completed azithromycin 500 mg x 3 days  Aspiration precautions- discussed with patient and family

## 2025-03-14 NOTE — OCCUPATIONAL THERAPY NOTE
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, psychotic disturbance, mood disturbance, or anxiety (HCC)    Hypertension    Bilateral hearing loss    Aneurysm, carotid artery, internal    Acute on chronic respiratory failure with hypoxia and hypercapnia (HCC)    Chronic diastolic (congestive) heart failure (HCC)    JAY treated with BiPAP    Type 2 diabetes mellitus with diabetic peripheral angiopathy without gangrene, without long-term current use of insulin (HCC)    Past Medical History  Past Medical History:   Diagnosis Date    Acute metabolic encephalopathy 06/13/2020    Age-related cataract of right eye 04/04/2023    patient is medically cleared and presents with low risk of complication with this upcoming R cataract surgery.    Anemia     Aneurysm, aorta, thoracic (HCC)     Appendicolith     Ascending aortic aneurysm (HCC)     3.7    Asthma     BPH (benign prostatic hyperplasia)     CAD (coronary artery disease)     noted on CT scan    CHF (congestive heart failure) ([facility])     COPD (chronic obstructive pulmonary disease) ([facility])     Delirium 04/25/2024    Descending thoracic aortic aneurysm (HCC)     Diabetes mellitus (HCC)     Diverticulosis     Former tobacco use     GERD (gastroesophageal reflux disease)     History of DVT (deep vein thrombosis)     Left leg    History of transfusion     Hypertension     Hypoxemia 04/30/2023    Inguinal hernia     right    Nephrolithiasis     Oxygen dependent     2LNC    Oxygen dependent     Pneumonia     Pre-diabetes     Prostate calculus     PVD (peripheral vascular disease) ([facility])     Recurrent right inguinal hernia w incarcertion 01/04/2023    SIRS (systemic inflammatory response syndrome) ([facility]) 09/04/2024    Solitary pulmonary nodule on lung CT 02/02/2025     8 x 5 mm right middle lobe nodule, most recently 5 x 3 mm (3/163) and about 4 x 2 mm in January 2024.       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)     Varicose vein of leg     b/l     Past Surgical History  Past Surgical History:   Procedure Laterality Date    CARDIAC SURGERY      ESOPHAGOGASTRODUODENOSCOPY      HERNIA REPAIR Right 1/21/2019    Procedure: REPAIR HERNIA INGUINAL WITH MESH;  Surgeon: David Lowry MD;  Location:  MAIN OR;  Service: General    HERNIA REPAIR Right 7/22/2023    Procedure: REPAIR RECURRENT INCARERATED HERNIA INGUINAL OPEN, RIGHT ORCHIECTOMY;  Surgeon: Mason Bertrand MD;  Location: [facility];  Service: General    INGUINAL HERNIA REPAIR Bilateral     IR TEVAR  12/27/2018    NC EVASC RPR DTA COVERAGE ART ORIGIN 1ST ENDOPROSTH N/A 12/27/2018    Procedure: TEVAR - endovascular thoracic aortic aneurysm repair;  Surgeon: Gladis Ortega MD;  Location: [facility];  Service: Vascular    THORACIC AORTIC ANEURYSM REPAIR  12/27/2018    VARICOSE VEIN SURGERY Bilateral     vein stripping         03/14/25 0935   Note Type   Note type Evaluation   Pain Assessment   Pain Assessment Tool FLACC   Pain Rating: FLACC (Rest) - Face 0   Pain Rating: FLACC (Rest) - Legs 0   Pain Rating: FLACC (Rest) - Activity 0   Pain Rating: FLACC (Rest) - Cry 0   Pain Rating: FLACC (Rest) - Consolability 0   Score: FLACC (Rest) 0   Restrictions/Precautions   Weight Bearing Precautions Per Order No   Other Precautions Cognitive;Chair Alarm;Bed Alarm;O2;Fall Risk;Multiple lines;Hard of hearing  (O2=5liters)   Home Living   Type of Home House   Home Layout Multi-level;Bed/bath upstairs  (13 steps to 2nd)   Bathroom Shower/Tub Tub/shower unit   Bathroom Toilet Standard   Home Equipment Walker;Cane;Wheelchair-manual   Prior Function   Level of  "[place] Needs assistance with functional mobility;Needs assistance with ADLs;Needs assistance with IADLS   Lives With Spouse   Falls in the last 6 months 0   Lifestyle   Autonomy PTA wife states pt had assistance with his ADLs, transfers, ambulation--limited distances, uses RW; w/c for longer distances; neg falls   Reciprocal Relationships supportive family   Service to Others unable to assess   Intrinsic Gratification watching TV   Subjective   Subjective \"I don't know where I am.\"   ADL   Where Assessed Edge of bed   Eating Assistance 5  Supervision/Setup   Grooming Assistance 5  Supervision/Setup   UB Bathing Assistance 5  Supervision/Setup   LB Bathing Assistance 3  Moderate Assistance   UB Dressing Assistance 5  Supervision/Setup   LB Dressing Assistance 3  Moderate Assistance   Toileting Assistance  3  Moderate Assistance   Bed Mobility   Rolling R 4  Minimal assistance   Additional items Assist x 1;Increased time required;Verbal cues;LE management   Rolling L 4  Minimal assistance   Additional items Assist x 1;Increased time required;Verbal cues;LE management   Supine to Sit 3  Moderate assistance   Additional items Assist x 1;Increased time required;Verbal cues;LE management   Transfers   Sit to Stand 4  Minimal assistance   Additional items Assist x 1;Increased time required;Verbal cues   Stand to Sit 4  Minimal assistance   Additional items Assist x 1;Increased time required;Verbal cues   Functional Mobility   Functional Mobility 4  Minimal assistance   Additional Comments x1   Additional items Rolling walker   Balance   Static Sitting Fair   Dynamic Sitting Fair -   Static Standing Poor +   Dynamic Standing Poor +   Activity Tolerance   Activity Tolerance Patient limited by fatigue   Medical Staff Made Aware nsg, P.T., CM   RUE Assessment   RUE Assessment WFL   RUE Strength   RUE Overall Strength Within Functional Limits - able to perform ADL tasks with strength  (4/5 throughout)   LUE Assessment "   LUE Assessment WFL   LUE Strength   LUE Overall Strength Within Functional Limits - able to perform ADL tasks with strength  (4/5 throughout)   Hand Function   Gross Motor Coordination Functional   Fine Motor Coordination Functional   Sensation   Light Touch No apparent deficits   Proprioception   Proprioception No apparent deficits   Vision-Basic Assessment   Current Vision Wears glasses for distance only   Vision - Complex Assessment   Acuity   (impaired)   Perception   Inattention/Neglect Appears intact   Cognition   Overall Cognitive Status Impaired   Arousal/Participation Alert   Attention Attends with cues to redirect   Orientation Level Oriented to person;Disoriented to place;Disoriented to time;Disoriented to situation   Memory Decreased short term memory;Decreased recall of recent events;Decreased recall of precautions   Following Commands Follows one step commands with increased time or repetition   Comments Italian-speaking only; hx vascular dementia   Assessment   Limitation Decreased ADL status;Decreased UE strength;Decreased Safe judgement during ADL;Decreased cognition;Decreased endurance;Decreased high-level ADLs   Prognosis Fair   Assessment Pt is a 89y/o male admitted to the hospital([facility]) 2* symptoms of altered mental status, acute respiratory failure(VDRF). Pt noted with questionable PNA, acute exacerbaion of COPD. Pt with PMH anemia, AAA, COPD, CAD, O2 dependent, DM, BPH, PVD, dementia, and recent(last month) hospitalization 2* AMS. PTA wife states pt had assistance with his ADLs, transfers, ambulation--limited distances, uses RW; w/c for longer distances; neg falls. During initial eval, pt demonstrated deficits with his functional balance, functional mobility, ADL status, transfer safety, b/l UE strength, activity tolerance(currently fair=152-20mins), and cognition(i.e.orientation, memory, problem-solving, judgement/safety). Pt would benefit from continued OT tx for the above  deficits. 2-5xwk/1-2wks. The patient's raw score on the AM-PAC Daily Activity Inpatient Short Form is 15. A raw score of less than 19 suggests the patient may benefit from discharge to post-acute rehabilitation services. Please refer to the recommendation of the Occupational Therapist for safe discharge planning.   Goals   Patient Goals none stated   STG Time Frame   (1-7 days)   Short Term Goal #1 Pt will demonstrate improved activity tolerance to good(20-30mins) and standing tolerance to 3-5mins to assist with ADLs.   Short Term Goal #2 Pt will demonstrate proper walker/transfer safety 100% of the time.   Short Term Goal  Pt will demonstrate supervision with their sit-stand transfers to assist with completion of their LE dressing.   LTG Time Frame   (7-14 days)   Long Term Goal #1 Pt will demonstrate g/g- balance with all functional activities.   Long Term Goal #2 Pt will demonstrate supervision with their UE and [place] with LE bathing/dresssing.   Long Term Goal Pt will demonstrate supervision with their bed mobility to facilitate EOB ADLs.   Plan   Treatment Interventions ADL retraining;Functional transfer training;UE strengthening/ROM;Endurance training;Cognitive reorientation;Patient/family training;Equipment evaluation/education;Compensatory technique education;Continued evaluation   Goal Expiration Date 03/28/25   OT Treatment Day 0   OT Frequency   (2-5xwk/1-2wks)   Discharge Recommendation   Rehab Resource Intensity Level, OT II (Moderate Resource Intensity)   AM-PAC Daily Activity Inpatient   Lower Body Dressing 2   Bathing 2   Toileting 2   Upper Body Dressing 3   Grooming 3   Eating 3   Daily Activity Raw Score 15   Daily Activity Standardized Score (Calc for Raw Score >=11) 34.69   AM-PAC Applied Cognition Inpatient   Following a Speech/Presentation 3   Understanding Ordinary Conversation 3   Taking Medications 2   Remembering Where Things Are Placed or Put Away 2   Remembering List of 4-5 Errands 2    Taking Care of Complicated Tasks 2   Applied Cognition Raw Score 14   Applied Cognition Standardized Score 32.02   Corona Richardson

## 2025-03-15 VITALS
BODY MASS INDEX: 23.12 KG/M2 | HEIGHT: 63 IN | RESPIRATION RATE: 18 BRPM | OXYGEN SATURATION: 90 % | WEIGHT: 130.51 LBS | SYSTOLIC BLOOD PRESSURE: 121 MMHG | HEART RATE: 79 BPM | DIASTOLIC BLOOD PRESSURE: 59 MMHG | TEMPERATURE: 98.1 F

## 2025-03-15 LAB
ANION GAP SERPL CALCULATED.3IONS-SCNC: 2 MMOL/L (ref 4–13)
BUN SERPL-MCNC: 17 MG/DL (ref 5–25)
CALCIUM SERPL-MCNC: 8.8 MG/DL (ref 8.4–10.2)
CHLORIDE SERPL-SCNC: 101 MMOL/L (ref 96–108)
CO2 SERPL-SCNC: 42 MMOL/L (ref 21–32)
CREAT SERPL-MCNC: 0.69 MG/DL (ref 0.6–1.3)
ERYTHROCYTE [DISTWIDTH] IN BLOOD BY AUTOMATED COUNT: 13.2 % (ref 11.6–15.1)
GFR SERPL CREATININE-BSD FRML MDRD: 85 ML/MIN/1.73SQ M
GLUCOSE SERPL-MCNC: 114 MG/DL (ref 65–140)
GLUCOSE SERPL-MCNC: 126 MG/DL (ref 65–140)
GLUCOSE SERPL-MCNC: 199 MG/DL (ref 65–140)
HCT VFR BLD AUTO: 35.5 % (ref 36.5–49.3)
HGB BLD-MCNC: 10.4 G/DL (ref 12–17)
MCH RBC QN AUTO: 32 PG (ref 26.8–34.3)
MCHC RBC AUTO-ENTMCNC: 29.3 G/DL (ref 31.4–37.4)
MCV RBC AUTO: 109 FL (ref 82–98)
PLATELET # BLD AUTO: 120 THOUSANDS/UL (ref 149–390)
PMV BLD AUTO: 10 FL (ref 8.9–12.7)
POTASSIUM SERPL-SCNC: 3.8 MMOL/L (ref 3.5–5.3)
RBC # BLD AUTO: 3.25 MILLION/UL (ref 3.88–5.62)
SODIUM SERPL-SCNC: 145 MMOL/L (ref 135–147)
WBC # BLD AUTO: 8.68 THOUSAND/UL (ref 4.31–10.16)

## 2025-03-15 PROCEDURE — 99239 HOSP IP/OBS DSCHRG MGMT >30: CPT | Performed by: INTERNAL MEDICINE

## 2025-03-15 PROCEDURE — 82948 REAGENT STRIP/BLOOD GLUCOSE: CPT

## 2025-03-15 PROCEDURE — 80048 BASIC METABOLIC PNL TOTAL CA: CPT | Performed by: INTERNAL MEDICINE

## 2025-03-15 PROCEDURE — 85027 COMPLETE CBC AUTOMATED: CPT | Performed by: INTERNAL MEDICINE

## 2025-03-15 PROCEDURE — 94760 N-INVAS EAR/PLS OXIMETRY 1: CPT

## 2025-03-15 PROCEDURE — 94640 AIRWAY INHALATION TREATMENT: CPT

## 2025-03-15 RX ORDER — CEFPODOXIME PROXETIL 200 MG/1
200 TABLET, FILM COATED ORAL 2 TIMES DAILY
Qty: 4 TABLET | Refills: 0 | Status: SHIPPED | OUTPATIENT
Start: 2025-03-15 | End: 2025-03-17

## 2025-03-15 RX ORDER — MELATONIN 3 MG
6 CAPSULE ORAL
Qty: 60 CAPSULE | Refills: 0 | Status: SHIPPED | OUTPATIENT
Start: 2025-03-15

## 2025-03-15 RX ORDER — IPRATROPIUM BROMIDE AND ALBUTEROL SULFATE 2.5; .5 MG/3ML; MG/3ML
3 SOLUTION RESPIRATORY (INHALATION) 2 TIMES DAILY
Qty: 540 ML | Refills: 0 | Status: SHIPPED | OUTPATIENT
Start: 2025-03-15 | End: 2026-03-10

## 2025-03-15 RX ORDER — BUDESONIDE 0.5 MG/2ML
0.5 INHALANT ORAL 2 TIMES DAILY
Qty: 360 ML | Refills: 0 | Status: SHIPPED | OUTPATIENT
Start: 2025-03-15 | End: 2026-03-10

## 2025-03-15 RX ADMIN — LEVALBUTEROL HYDROCHLORIDE 1.25 MG: 1.25 SOLUTION RESPIRATORY (INHALATION) at 07:51

## 2025-03-15 RX ADMIN — ENOXAPARIN SODIUM 40 MG: 40 INJECTION SUBCUTANEOUS at 08:28

## 2025-03-15 RX ADMIN — IPRATROPIUM BROMIDE 0.5 MG: 0.5 SOLUTION RESPIRATORY (INHALATION) at 07:51

## 2025-03-15 RX ADMIN — PANTOPRAZOLE SODIUM 40 MG: 40 TABLET, DELAYED RELEASE ORAL at 06:01

## 2025-03-15 RX ADMIN — LOSARTAN POTASSIUM 50 MG: 50 TABLET, FILM COATED ORAL at 08:28

## 2025-03-15 NOTE — PROGRESS NOTES
Assessment unchanged from earlier note, wife remains in room with him and he has not tried to get OOB at all so far, resting quietly in bed, offers no complaints will continue to monitor, remains on virtual 1:!

## 2025-03-15 NOTE — ASSESSMENT & PLAN NOTE
Day 6 of 7 of antibiotic therapy  Likely switched to cefpodoxime at discharge  Completed 3 days of antibiotic treatment  Continue regular diet with nectar thick liquids upon discharge, although ideally dysphagia 3 seems to have the best result

## 2025-03-15 NOTE — PROGRESS NOTES
Pt has been trying to remove hs bipap, continuing to reapply it when he does so, assisted with use of urinal

## 2025-03-15 NOTE — PLAN OF CARE
Problem: PAIN - ADULT  Goal: Verbalizes/displays adequate comfort level or baseline comfort level  Description: Interventions:  - Encourage patient to monitor pain and request assistance  - Assess pain using appropriate pain scale  - Administer analgesics based on type and severity of pain and evaluate response  - Implement non-pharmacological measures as appropriate and evaluate response  - Consider cultural and social influences on pain and pain management  - Notify physician/advanced practitioner if interventions unsuccessful or patient reports new pain  Outcome: Adequate for Discharge     Problem: INFECTION - ADULT  Goal: Absence or prevention of progression during hospitalization  Description: INTERVENTIONS:  - Assess and monitor for signs and symptoms of infection  - Monitor lab/diagnostic results  - Monitor all insertion sites, i.e. indwelling lines, tubes, and drains  - Monitor endotracheal if appropriate and nasal secretions for changes in amount and color  - [place] appropriate cooling/warming therapies per order  - Administer medications as ordered  - Instruct and encourage patient and family to use good hand hygiene technique  - Identify and instruct in appropriate isolation precautions for identified infection/condition  Outcome: Adequate for Discharge  Goal: Absence of fever/infection during neutropenic period  Description: INTERVENTIONS:  - Monitor WBC    Outcome: Adequate for Discharge     Problem: SAFETY ADULT  Goal: Patient will remain free of falls  Description: INTERVENTIONS:  - Educate patient/family on patient safety including physical limitations  - Instruct patient to call for assistance with activity   - Consult OT/PT to assist with strengthening/mobility   - Keep Call bell within reach  - Keep bed low and locked with side rails adjusted as appropriate  - Keep care items and personal belongings within reach  - Initiate and maintain comfort rounds  - Make Fall Risk Sign visible to  staff  - Offer Toileting every 2 Hours, in advance of need  - Initiate/Maintain bed alarm  - Obtain necessary fall risk management equipment:   - Apply yellow socks and bracelet for high fall risk patients  - Consider moving patient to room near nurses station  Outcome: Adequate for Discharge  Goal: Maintain or return to baseline ADL function  Description: INTERVENTIONS:  -  Assess patient's ability to carry out ADLs; assess patient's baseline for ADL function and identify physical deficits which impact ability to perform ADLs (bathing, care of mouth/teeth, toileting, grooming, dressing, etc.)  - Assess/evaluate cause of self-care deficits   - Assess range of motion  - Assess patient's mobility; develop plan if impaired  - Assess patient's need for assistive devices and provide as appropriate  - Encourage maximum independence but intervene and supervise when necessary  - Involve family in performance of ADLs  - Assess for home care needs following discharge   - Consider OT consult to assist with ADL evaluation and planning for discharge  - Provide patient education as appropriate  Outcome: Adequate for Discharge  Goal: Maintains/Returns to pre admission functional level  Description: INTERVENTIONS:  - Perform AM-PAC 6 Click Basic Mobility/ Daily Activity assessment daily.  - Set and communicate daily mobility goal to care team and patient/family/caregiver.   - Collaborate with rehabilitation services on mobility goals if consulted  - Perform Range of Motion 3 times a day.  - Reposition patient every 2 hours.  - Dangle patient 3 times a day  - Stand patient 3 times a day  - Ambulate patient 3 times a day  - Out of bed to chair 3 times a day   - Out of bed for meals 3 times a day  - Out of bed for toileting  - Record patient progress and toleration of activity level   Outcome: Adequate for Discharge     Problem: DISCHARGE PLANNING  Goal: Discharge to home or other facility with appropriate resources  Description:  INTERVENTIONS:  - Identify barriers to discharge w/patient and caregiver  - Arrange for needed discharge resources and transportation as appropriate  - Identify discharge learning needs (meds, wound care, etc.)  - Arrange for interpretive services to assist at discharge as needed  - Refer to Case Management Department for coordinating discharge planning if the patient needs post-hospital services based on physician/advanced practitioner order or complex needs related to functional status, cognitive ability, or social support system  Outcome: Adequate for Discharge     Problem: Knowledge Deficit  Goal: Patient/family/caregiver demonstrates understanding of disease process, treatment plan, medications, and discharge instructions  Description: Complete learning assessment and assess knowledge base.  Interventions:  - Provide teaching at level of understanding  - Provide teaching via preferred learning methods  Outcome: Adequate for Discharge     Problem: SAFETY,RESTRAINT: NV/NON-SELF DESTRUCTIVE BEHAVIOR  Goal: Remains free of harm/injury (restraint for non violent/non self-detsructive behavior)  Description: INTERVENTIONS:  - Instruct patient/family regarding restraint use   - Assess and monitor physiologic and psychological status   - Provide interventions and comfort measures to meet assessed patient needs   - Identify and implement measures to help patient regain control  - Assess readiness for release of restraint   Outcome: Adequate for Discharge  Goal: Returns to optimal restraint-free functioning  Description: INTERVENTIONS:  - Assess the patient's behavior and symptoms that indicate continued need for restraint  - Identify and implement measures to help patient regain control  - Assess readiness for release of restraint   Outcome: Adequate for Discharge     Problem: Nutrition/Hydration-ADULT  Goal: Nutrient/Hydration intake appropriate for improving, restoring or maintaining nutritional needs  Description:  Monitor and assess patient's nutrition/hydration status for malnutrition. Collaborate with interdisciplinary team and initiate plan and interventions as ordered.  Monitor patient's weight and dietary intake as ordered or per policy. Utilize nutrition screening tool and intervene as necessary. Determine patient's food preferences and provide high-protein, high-caloric foods as appropriate.     INTERVENTIONS:  - Monitor oral intake, urinary output, labs, and treatment plans  - Assess nutrition and hydration status and recommend course of action  - Evaluate amount of meals eaten  - Assist patient with eating if necessary   - Allow adequate time for meals  - Recommend/ encourage appropriate diets, oral nutritional supplements, and vitamin/mineral supplements  - Order, calculate, and assess calorie counts as needed  - Recommend, monitor, and adjust tube feedings and TPN/PPN based on assessed needs  - Assess need for intravenous fluids  - Provide specific nutrition/hydration education as appropriate  - Include patient/family/caregiver in decisions related to nutrition  Outcome: Adequate for Discharge

## 2025-03-15 NOTE — ASSESSMENT & PLAN NOTE
Lab Results   Component Value Date    HGBA1C 6.1 (H) 03/09/2025       Recent Labs     03/14/25  1745 03/14/25  2121 03/15/25  0001 03/15/25  0645   POCGLU 116 115 199* 114         Blood Sugar Average: Last 72 hrs:  (P) 136.375  Well-controlled continue previous regimen

## 2025-03-15 NOTE — ASSESSMENT & PLAN NOTE
Due to aspiration pneumonia in the setting of his severe COPD  Baseline oxygen requirement of 2 to 3 L of will continue to wean back  Has BiPAP at home which is ordered at night but he is significant noncompliant  Back on usual 3 L

## 2025-03-15 NOTE — PLAN OF CARE
Problem: PAIN - ADULT  Goal: Verbalizes/displays adequate comfort level or baseline comfort level  Description: Interventions:  - Encourage patient to monitor pain and request assistance  - Assess pain using appropriate pain scale  - Administer analgesics based on type and severity of pain and evaluate response  - Implement non-pharmacological measures as appropriate and evaluate response  - Consider cultural and social influences on pain and pain management  - Notify physician/advanced practitioner if interventions unsuccessful or patient reports new pain  Outcome: Progressing     Problem: Nutrition/Hydration-ADULT  Goal: Nutrient/Hydration intake appropriate for improving, restoring or maintaining nutritional needs  Description: Monitor and assess patient's nutrition/hydration status for malnutrition. Collaborate with interdisciplinary team and initiate plan and interventions as ordered.  Monitor patient's weight and dietary intake as ordered or per policy. Utilize nutrition screening tool and intervene as necessary. Determine patient's food preferences and provide high-protein, high-caloric foods as appropriate.     INTERVENTIONS:  - Monitor oral intake, urinary output, labs, and treatment plans  - Assess nutrition and hydration status and recommend course of action  - Evaluate amount of meals eaten  - Assist patient with eating if necessary   - Allow adequate time for meals  - Recommend/ encourage appropriate diets, oral nutritional supplements, and vitamin/mineral supplements  - Order, calculate, and assess calorie counts as needed  - Recommend, monitor, and adjust tube feedings and TPN/PPN based on assessed needs  - Assess need for intravenous fluids  - Provide specific nutrition/hydration education as appropriate  - Include patient/family/caregiver in decisions related to nutrition  Outcome: Progressing     Problem: SAFETY,RESTRAINT: NV/NON-SELF DESTRUCTIVE BEHAVIOR  Goal: Remains free of harm/injury  (restraint for non violent/non self-detsructive behavior)  Description: INTERVENTIONS:  - Instruct patient/family regarding restraint use   - Assess and monitor physiologic and psychological status   - Provide interventions and comfort measures to meet assessed patient needs   - Identify and implement measures to help patient regain control  - Assess readiness for release of restraint   Outcome: Progressing  Goal: Returns to optimal restraint-free functioning  Description: INTERVENTIONS:  - Assess the patient's behavior and symptoms that indicate continued need for restraint  - Identify and implement measures to help patient regain control  - Assess readiness for release of restraint   Outcome: Progressing     Problem: INFECTION - ADULT  Goal: Absence or prevention of progression during hospitalization  Description: INTERVENTIONS:  - Assess and monitor for signs and symptoms of infection  - Monitor lab/diagnostic results  - Monitor all insertion sites, i.e. indwelling lines, tubes, and drains  - Monitor endotracheal if appropriate and nasal secretions for changes in amount and color  - [place] appropriate cooling/warming therapies per order  - Administer medications as ordered  - Instruct and encourage patient and family to use good hand hygiene technique  - Identify and instruct in appropriate isolation precautions for identified infection/condition  Outcome: Progressing  Goal: Absence of fever/infection during neutropenic period  Description: INTERVENTIONS:  - Monitor WBC    Outcome: Progressing     Problem: SAFETY ADULT  Goal: Patient will remain free of falls  Description: INTERVENTIONS:  - Educate patient/family on patient safety including physical limitations  - Instruct patient to call for assistance with activity   - Consult OT/PT to assist with strengthening/mobility   - Keep Call bell within reach  - Keep bed low and locked with side rails adjusted as appropriate  - Keep care items and personal  belongings within reach  - Initiate and maintain comfort rounds  - Make Fall Risk Sign visible to staff  - Offer Toileting every 2 Hours, in advance of need  - Initiate/Maintain bed alarm  - Obtain necessary fall risk management equipment:   - Apply yellow socks and bracelet for high fall risk patients  - Consider moving patient to room near nurses station  Outcome: Progressing  Goal: Maintain or return to baseline ADL function  Description: INTERVENTIONS:  -  Assess patient's ability to carry out ADLs; assess patient's baseline for ADL function and identify physical deficits which impact ability to perform ADLs (bathing, care of mouth/teeth, toileting, grooming, dressing, etc.)  - Assess/evaluate cause of self-care deficits   - Assess range of motion  - Assess patient's mobility; develop plan if impaired  - Assess patient's need for assistive devices and provide as appropriate  - Encourage maximum independence but intervene and supervise when necessary  - Involve family in performance of ADLs  - Assess for home care needs following discharge   - Consider OT consult to assist with ADL evaluation and planning for discharge  - Provide patient education as appropriate  Outcome: Progressing  Goal: Maintains/Returns to pre admission functional level  Description: INTERVENTIONS:  - Perform AM-PAC 6 Click Basic Mobility/ Daily Activity assessment daily.  - Set and communicate daily mobility goal to care team and patient/family/caregiver.   - Collaborate with rehabilitation services on mobility goals if consulted  - Perform Range of Motion 3 times a day.  - Reposition patient every 2 hours.  - Dangle patient 3 times a day  - Stand patient 3 times a day  - Ambulate patient 3 times a day  - Out of bed to chair 3 times a day   - Out of bed for meals 3 times a day  - Out of bed for toileting  - Record patient progress and toleration of activity level   Outcome: Progressing     Problem: DISCHARGE PLANNING  Goal: Discharge to  home or other facility with appropriate resources  Description: INTERVENTIONS:  - Identify barriers to discharge w/patient and caregiver  - Arrange for needed discharge resources and transportation as appropriate  - Identify discharge learning needs (meds, wound care, etc.)  - Arrange for interpretive services to assist at discharge as needed  - Refer to Case Management Department for coordinating discharge planning if the patient needs post-hospital services based on physician/advanced practitioner order or complex needs related to functional status, cognitive ability, or social support system  Outcome: Progressing     Problem: Knowledge Deficit  Goal: Patient/family/caregiver demonstrates understanding of disease process, treatment plan, medications, and discharge instructions  Description: Complete learning assessment and assess knowledge base.  Interventions:  - Provide teaching at level of understanding  - Provide teaching via preferred learning methods  Outcome: Progressing

## 2025-03-15 NOTE — DISCHARGE SUMMARY
Discharge Summary - Hospitalist   Name: Severiano Feliciano 89 y.o. male I MRN: [id number]  Unit/Bed#: E4 -01 I Date of Admission: 3/9/2025   Date of Service: 3/15/2025 I Hospital Day: 6         Admitting Provider:  Lanie Baez MD  Discharge Provider:  Benson Gold DO  Admission Date: 3/9/2025       Discharge Date: 03/15/25   LOS: 6  Primary Care Physician at Discharge: Kirby Casanova [phone]    HOSPITAL COURSE:  Severiano Feliciano is a 89 y.o. male who presented shortness of breath and difficulty breathing.  The patient is a past medical history of vascular dementia which has been complicated by previous admissions for aspiration pneumonitis and pneumonia in the past.  He has had multiple goals of care discussion however still remains full code and family has not pursued hospice and/or palliative care.  His other medical problems include severe COPD, CHF, diabetes mellitus.  The patient due to his respiratory complaint was initially seen at the [facility].  He has significant fever as well as dyspnea on exertion and was subsequently intubated prior to arrival to the emergency room.  He was subsequently transferred to [facility] ICU for critical care level support.  He was started on IV antibiotics and was maintained on a ventilator.  He was fortunately able to be liberated from ventilator and subsequently released to the general medical floor.  The patient was monitored on the services of internal medicine he received 6 days of ceftriaxone.  He was followed by pulmonary services while inpatient.  It was felt that his pneumonia was likely secondary to aspiration event.    The patient was evaluated by geriatric services to help optimize patient's medication regimen.  The patient remained clinically stable although did develop some delirium.    Subsequently cleared for discharge back home and will have close pulmonary follow-up within the next 4  days.    The patient remains high risk for readmission as well as sudden respiratory compromise given ongoing aspiration and severe dementia.    At the time of discharge the patient was tolerating oral diet they were without acute complaint and they were medically cleared for discharge.  All questions were answered the patient's satisfaction and they were in agreement with the discharge plan.    DISCHARGE DIAGNOSES  * COPD, very severe (HCC)  Assessment & Plan  History of very severe COPD, had previously been scheduled for outpatient palliative care  Continue bronchodilators, Atrovent and Xopenex 3 times daily  On discharge will plan to resume budesonide twice daily and DuoNebs 3 times daily  Patient has scheduled hospital follow-up on 3/19/2025 with pulmonary  To wean back to home oxygen requirement    Type 2 diabetes mellitus with diabetic peripheral angiopathy without gangrene, without long-term current use of insulin ([facility])  Assessment & Plan  Lab Results   Component Value Date    HGBA1C 6.1 (H) 03/09/2025       Recent Labs     03/14/25  1745 03/14/25  2121 03/15/25  0001 03/15/25  0645   POCGLU 116 115 199* 114         Blood Sugar Average: Last 72 hrs:  (P) 136.375  Well-controlled continue previous regimen      JAY treated with BiPAP  Assessment & Plan  Patient not adherent to BiPAP, will encourage     Chronic diastolic (congestive) heart failure (HCC)  Assessment & Plan  Wt Readings from Last 3 Encounters:   03/15/25 59.2 kg (130 lb 8.2 oz)   02/13/25 59.9 kg (132 lb)   02/02/25 60.3 kg (132 lb 15 oz)   Appears at dry weight and slightly below  Continue goal-directed medical therapy and diuretics as needed            Acute on chronic respiratory failure with hypoxia and hypercapnia (HCC)  Assessment & Plan  Due to aspiration pneumonia in the setting of his severe COPD  Baseline oxygen requirement of 2 to 3 L of will continue to wean back  Has BiPAP at home which is ordered at night but he is significant  noncompliant  Back on usual 3 L    Aneurysm, carotid artery, internal  Assessment & Plan  History of status post stenting  Continue Cozaar with hold parameters    Hypertension  Assessment & Plan  Continue losartan was also previously on amlodipine which was discontinued    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, psychotic disturbance, mood disturbance, or anxiety (HCC)  Assessment & Plan  Has moderate vascular and hypoxic dementia without behavioral or psychotic mood disorder  Developing some delirium    Aspiration pneumonia (HCC)  Assessment & Plan  Day 6 of 7 of antibiotic therapy  Likely switched to cefpodoxime at discharge  Completed 3 days of antibiotic treatment  Continue regular diet with nectar thick liquids upon discharge, although ideally dysphagia 3 seems to have the best result      CONSULTING PROVIDERS   IP CONSULT TO CASE MANAGEMENT  IP CONSULT TO PHARMACY  IP CONSULT TO NUTRITION SERVICES  IP CONSULT TO GERONTOLOGY    PROCEDURES PERFORMED  * No surgery found *    RADIOLOGY RESULTS  XR chest portable ICU  Result Date: 3/11/2025  Impression: Congestive changes with right lower lobe infiltrate and bilateral small effusions more on the right Workstation performed: [id number]     Bronchoscopy  Result Date: 3/9/2025  Impression: Erythematous, hyperemic mucosa in the left main stem, left upper lobar bronchus, bronchus intermedius and right lower lobar bronchus; performed washing Bronchoalveolar lavage was performed x2 in the right apical segment (RB1) RECOMMENDATION: Follow BAL cx      CTA dissection protocol chest abdomen pelvis w wo contrast  Result Date: 3/9/2025  Impression: Interval appearance of a dense consolidation occupying the majority of the right lower lobe. New occlusion of proximal right lower and middle lobe bronchi. No pulmonary embolism identified allowing for mild respiratory motion artifact peripherally. 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  Redemonstrated 8 mm right middle lobe nodule. Continue with February 2025 recommendation of 6-month chest CT follow-up from that time. The study was marked in EPIC for immediate notification. Workstation performed: [id number]     CT head without contrast  Result Date: 3/9/2025  Impression: 1.  No acute intracranial CT abnormality. 2.  Stable right greater than left anterior inferior bilateral frontal lobe encephalomalacia, likely sequela of remote trauma or infarct.. 3.  Chronic microangiopathic change. 4.  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 Resident: Kin Miller I, the attending radiologist, have reviewed the images and agree with the final report above. Workstation performed: [id number]     XR chest 1 view portable  Result Date: 3/9/2025  Impression: Endotracheal and enteric tube as above. Dense right lower lobe consolidation, suspicious for pneumonia /aspiration pneumonitis. Resident: Ivan Duran I, the attending radiologist, have reviewed the images and agree with the final report above. Workstation performed: [id number]       LABS  Results from last 7 days   Lab Units 03/15/25  0522 03/14/25  0638 03/13/25  0551 03/12/25  0637 03/11/25  0515 03/10/25  0547 03/09/25 1959 03/09/25 1951 03/09/25  1107   WBC Thousand/uL 8.68 8.20 9.54 9.67 10.69* 11.26*  --  11.42* 8.49   HEMOGLOBIN g/dL 10.4* 10.8* 10.6* 9.2* 8.6* 8.7*  --  8.7* 10.9*   HEMATOCRIT % 35.5* 35.9* 35.1* 30.6* 28.8* 29.1*  --  30.6* 40.1   MCV fL 109* 107* 106* 106* 108* 106*  --  112* 117*   PLATELETS Thousands/uL 120* 123* 139* 100* 109* 84*  --  92* 123*   INR   --   --   --   --   --   --  1.12  --  0.90     Results from last 7 days   Lab Units 03/15/25  0522 03/14/25  0638 03/13/25  0551 03/12/25  0550 03/11/25  0515 03/10/25  0547 03/09/25  1951 03/09/25  1107   SODIUM mmol/L 145 143 146 146 142 144 144 148*   POTASSIUM mmol/L 3.8 3.2* 3.4* 4.1 3.8 4.3 3.4* 4.5    CHLORIDE mmol/L 101 99 101 102 101 100 100 92*   CO2 mmol/L 42* 43* 39* 38* 38* 38* 37* >45*   BUN mg/dL 17 20 32* 33* 31* 22 18 19   CREATININE mg/dL 0.69 0.71 0.77 0.88 0.80 0.84 0.68 0.75   CALCIUM mg/dL 8.8 8.8 9.0 9.0 8.7 9.0 8.6 9.5   ALBUMIN g/dL  --   --   --   --   --   --  3.4* 3.9   TOTAL BILIRUBIN mg/dL  --   --   --   --   --   --  0.92 0.56   ALK PHOS U/L  --   --   --   --   --   --  45 65   ALT U/L  --   --   --   --   --   --  18 18   AST U/L  --   --   --   --   --   --  30 27   EGFR ml/min/1.73sq m 85 84 81 77 80 78 85 82   GLUCOSE RANDOM mg/dL 126 143* 118 104 134 152* 142* 136     Results from last 7 days   Lab Units 03/09/25  1258 03/09/25  1107   HS TNI 0HR ng/L  --  19   HS TNI 2HR ng/L 23  --           Results from last 7 days   Lab Units 03/09/25  1107   D-DIMER QUANTITATIVE ug/ml FEU 3.51*     Results from last 7 days   Lab Units 03/15/25  0645 03/15/25  0001 03/14/25 2121 03/14/25  1745 03/14/25  1201 03/14/25  0136 03/13/25  2001 03/13/25  1819 03/13/25  1213 03/13/25  0732   POC GLUCOSE mg/dl 114 199* 115 116 199* 112 141* 155* 162* 116     Results from last 7 days   Lab Units 03/09/25  1747   HEMOGLOBIN A1C % 6.1*     Results from last 7 days   Lab Units 03/09/25  1107   TSH 3RD GENERATON uIU/mL 0.237*   FREE T4 ng/dL 0.75     Results from last 7 days   Lab Units 03/09/25 2239 03/09/25  1951 03/09/25  1258 03/09/25  1107   LACTIC ACID mmol/L 2.2* 2.5* 1.7 2.8*   PROCALCITONIN ng/ml  --   --   --  0.10           Cultures:   Results from last 7 days   Lab Units 03/09/25  1154   COLOR UA  Straw   CLARITY UA  Clear   SPEC GRAV UA  1.010   PH UA  8.0   LEUKOCYTES UA  Negative   NITRITE UA  Negative   GLUCOSE UA mg/dl Negative   KETONES UA mg/dl Negative   BILIRUBIN UA  Negative   BLOOD UA  Negative      Results from last 7 days   Lab Units 03/09/25  1154   RBC UA /hpf None Seen   WBC UA /hpf None Seen   EPITHELIAL CELLS WET PREP /hpf None Seen   BACTERIA UA /hpf None Seen      Results  "from last 7 days   Lab Units 03/09/25  1745 03/09/25  1741 03/09/25  1705 03/09/25  1107   BLOOD CULTURE  No Growth After 5 Days. No Growth After 5 Days.  --  No Growth After 5 Days.  No Growth After 5 Days.   GRAM STAIN RESULT   --   --  No Polys or Bacteria seen  --              PHYSICAL EXAM:  Vitals:   Blood Pressure: 121/59 (03/15/25 0723)  Pulse: 79 (03/15/25 0723)  Temperature: 98.1 °F (36.7 °C) (03/15/25 0723)  Temp Source: Temporal (03/15/25 0723)  Respirations: 18 (03/15/25 0723)  Height: 5' 2.99\" (160 cm) (03/1935)  Weight - Scale: 59.2 kg (130 lb 8.2 oz) (03/15/25 0600)  SpO2: 90 % (03/15/25 0751)      General: well appearing, no acute distress  HEENT: atraumatic, PERRLA, moist mucosa, normal pharynx, normal tonsils and adenoids, normal tongue, no fluid in sinuses  Neck: Trachea midline, no carotid bruit, no masses  Respiratory: normal chest wall expansion, CTA B  Cardiovascular: RRR, no m/r/g, Normal S1 and S2  Abdomen: Soft, non-tender, non-distended, normal bowel sounds in all quadrants, no hepatosplenomegaly, no tympany  Rectal: deferred  Musculoskeletal: Moves all  Integumentary: warm, dry, and pink, with no visible rash, purpura, or petechia  Heme/Lymph: no lymphadenopathy, no bruises  Neurological: Cranial Nerves II-XII grossly intact  Psychiatric: cooperative with normal mood, affect, and cognition       Discharge Disposition: Discharged home  AM-PAC Basic Mobility:  Basic Mobility Inpatient Raw Score: 16    JH-HLM Achieved: 4: Move to chair/commode  -HLM Goal: 5: Stand one or more mins    HLM Goal listed above. Continue with ongoing physical therapy and encourage appropriate mobility to improve upon HLM goals.      Test Results Pending at Discharge:   Pending Labs       Order Current Status    Legionella culture In process    Virus culture Preliminary result                Medications   Summary of Medication Adjustments made as a result of this hospitalization: See discharge summary and " AVS for medication changes  Medication Dosing Tapers - Please refer to Discharge Medication List for details on any medication dosing tapers (if applicable to patient).  Discharge Medication List: See after visit summary for reconciled discharge medications.     Diet restrictions:         Diet Orders   (From admission, onward)                 Start     Ordered    03/14/25 1511  Diet Dysphagia/Modified Consistency; Dysphagia 3-Dental Soft; Nectar Thick Liquid  Diet effective now        References:    Adult Nutrition Support Algorithm    RD Therapeutic Diet Order Protocol   Question Answer Comment   Diet Type Dysphagia/Modified Consistency    Dysphagia/Modified Consistency Dysphagia 3-Dental Soft    Liquid Modifier Nectar Thick Liquid    RD to adjust diet per protocol? Yes        03/14/25 1511                  Activity restrictions: No strenuous activity  Discharge Condition: stable    Outpatient Follow-Up and Discharge Instructions  See after visit summary section titled Discharge Instructions for information provided to patient and family.      Code Status: Level 1 - Full Code  Discharge Statement   I spent 86 minutes discharging the patient. This time was spent on the day of discharge. Greater than 50% of total time was spent with the patient and / or family counseling and / or coordination of care.    ** Please Note: This note was completed in part utilizing Nuance Dragon Medical One Software.  Grammatical errors, random word insertions, spelling mistakes, and incomplete sentences may be an occasional consequence of this system secondary to software limitations, ambient noise, and hardware issues.  If you have any questions or concerns about the content, text, or information contained within the body of this dictation, please contact the provider for clarification.**

## 2025-03-15 NOTE — ASSESSMENT & PLAN NOTE
Wt Readings from Last 3 Encounters:   03/15/25 59.2 kg (130 lb 8.2 oz)   02/13/25 59.9 kg (132 lb)   02/02/25 60.3 kg (132 lb 15 oz)   Appears at dry weight and slightly below  Continue goal-directed medical therapy and diuretics as needed

## 2025-03-17 ENCOUNTER — TRANSITIONAL CARE MANAGEMENT (OUTPATIENT)
Dept: FAMILY MEDICINE CLINIC | Facility: CLINIC | Age: 89
End: 2025-03-17

## 2025-03-17 ENCOUNTER — PATIENT OUTREACH (OUTPATIENT)
Dept: CASE MANAGEMENT | Facility: OTHER | Age: 89
End: 2025-03-17

## 2025-03-17 NOTE — PROGRESS NOTES
Referral for Care Transitions.  Seen in [facility] ER for SOB, fever, dyspnea on exertion.  Was intubated prior to arrival.  Transferred to [facility] ICU.  Extubated and transferred to [facility] surg floor.  For six days ceftriaxone for pneumonia secondary to aspiration.      PMH: DM, COPD, JAY. CHF, Carotid aneurysm with stent, HTN, aspiration pneumonia and dementia.    Message left with contact information for Bridget Shafer RN, Outpatient Care Manager.

## 2025-03-18 NOTE — UTILIZATION REVIEW
NOTIFICATION OF ADMISSION DISCHARGE   This is a Notification of Discharge from [facility]. Please be advised that this patient has been discharge from our facility. Below you will find the admission and discharge date and time including the patient’s disposition.   UTILIZATION REVIEW CONTACT:  Tamika Dominguez  Utilization   Network Utilization Review Department  Phone: [phone] x carefully listen to the prompts. All voicemails are confidential.  Email: [email or web address]     ADMISSION INFORMATION  PRESENTATION DATE: 3/9/2025  3:28 PM  OBERVATION ADMISSION DATE: N/A  INPATIENT ADMISSION DATE: 3/9/25  3:28 PM   DISCHARGE DATE: 3/15/2025 12:00 PM   DISPOSITION:Home/Self Care    Network Utilization Review Department  ATTENTION: Please call with any questions or concerns to [phone] and carefully listen to the prompts so that you are directed to the right person. All voicemails are confidential.   For Discharge needs, contact Care Management DC Support Team at [phone] opt. 2  Send all requests for admission clinical reviews, approved or denied determinations and any other requests to dedicated fax number below belonging to the campus where the patient is receiving treatment. List of dedicated fax numbers for the Facilities:  FACILITY NAME UR FAX NUMBER   ADMISSION DENIALS (Administrative/Medical Necessity) [phone]   DISCHARGE SUPPORT TEAM ([facility]) [phone]   PARENT CHILD HEALTH (Maternity/NICU/Pediatrics) [phone]   [facility] [phone]   [facility] [phone]   [facility] [phone]   [facility] [phone]   [facility] [phone]   [facility] [phone]   [facility] [phone]   [facility] [phone]    [facility] [phone]   [facility] [phone]   [facility] [phone]   [facility] [phone]          symmetrical

## 2025-03-19 PROBLEM — E11.51 TYPE 2 DIABETES MELLITUS WITH DIABETIC PERIPHERAL ANGIOPATHY WITHOUT GANGRENE, WITHOUT LONG-TERM CURRENT USE OF INSULIN (HCC): Status: RESOLVED | Noted: 2025-02-13 | Resolved: 2025-03-19

## 2025-03-24 ENCOUNTER — PATIENT OUTREACH (OUTPATIENT)
Dept: CASE MANAGEMENT | Facility: OTHER | Age: 89
End: 2025-03-24

## 2025-03-24 NOTE — LETTER
Fecha: 03/24/25    Estimado/a Severiano Feliciano:      Mi nombre es Bridget. Soy un enfermero registrado administrador de atención de [facility].   No pude comunicarme con usted y me gustaría programar un horario para que hablemos por teléfono o personalmente.  Me dedico a ayudar a los pacientes que tienen afecciones médicas complejas a obtener la atención que necesitan. Hortense comprende a pacientes que pueden estar en el hospital o en sj cristian de emergencias.  Adjunto información para usted. Si tiene preguntas, no dude en llamarme. Espero rosa llamado.  Atentamente.  Bridget Shafer RN  [phone]  Gerente de atención ambulatoria      Copia:  (nombre y dirección del médico de atención primaria)

## 2025-03-24 NOTE — PROGRESS NOTES
Second attempt to contact patient for f/u post hospitalization for COPD.  Message left on vm.  Unable to reach letter sent.  Will close referral at this time.

## 2025-03-29 DIAGNOSIS — J96.12 CHRONIC RESPIRATORY FAILURE WITH HYPOXIA AND HYPERCAPNIA (HCC): Primary | ICD-10-CM

## 2025-03-29 DIAGNOSIS — J96.11 CHRONIC RESPIRATORY FAILURE WITH HYPOXIA AND HYPERCAPNIA (HCC): Primary | ICD-10-CM

## 2025-04-03 DIAGNOSIS — J96.12 CHRONIC RESPIRATORY FAILURE WITH HYPOXIA AND HYPERCAPNIA (HCC): Primary | ICD-10-CM

## 2025-04-03 DIAGNOSIS — J96.11 CHRONIC RESPIRATORY FAILURE WITH HYPOXIA AND HYPERCAPNIA (HCC): Primary | ICD-10-CM

## 2025-04-04 ENCOUNTER — TELEMEDICINE (OUTPATIENT)
Dept: FAMILY MEDICINE CLINIC | Facility: CLINIC | Age: 89
End: 2025-04-04
Payer: MEDICARE

## 2025-04-04 VITALS
WEIGHT: 120 LBS | OXYGEN SATURATION: 99 % | BODY MASS INDEX: 21.26 KG/M2 | HEART RATE: 83 BPM | SYSTOLIC BLOOD PRESSURE: 154 MMHG | DIASTOLIC BLOOD PRESSURE: 72 MMHG

## 2025-04-04 DIAGNOSIS — J44.9 COPD, SEVERE (HCC): ICD-10-CM

## 2025-04-04 DIAGNOSIS — Z74.09 IMPAIRED MOBILITY AND ADLS: ICD-10-CM

## 2025-04-04 DIAGNOSIS — K59.04 CHRONIC IDIOPATHIC CONSTIPATION: Primary | ICD-10-CM

## 2025-04-04 DIAGNOSIS — Z76.0 MEDICATION REFILL: ICD-10-CM

## 2025-04-04 DIAGNOSIS — Z78.9 IMPAIRED MOBILITY AND ADLS: ICD-10-CM

## 2025-04-04 PROCEDURE — 99214 OFFICE O/P EST MOD 30 MIN: CPT

## 2025-04-04 PROCEDURE — G2211 COMPLEX E/M VISIT ADD ON: HCPCS

## 2025-04-04 RX ORDER — AMOXICILLIN 250 MG
1 CAPSULE ORAL DAILY
Qty: 30 TABLET | Refills: 5 | Status: SHIPPED | OUTPATIENT
Start: 2025-04-04

## 2025-04-04 NOTE — ASSESSMENT & PLAN NOTE
Given refill for senna plus 8.6-50 mg daily.    Orders:    senna-docusate sodium (Senna Plus) 8.6-50 mg per tablet; Take 1 tablet by mouth daily    Incontinence Supplies KIT; Use as needed (Fecal/urinary incontinence)

## 2025-04-04 NOTE — ASSESSMENT & PLAN NOTE
Stressed the importance of adhering to budesonide 0.5 mg twice daily and DuoNeb 3 mL twice daily.  Continue nebulizer as patient unable to use inhalers effectively.  Continue oxygen supplementation.  Continue follow-up with pulmonology.

## 2025-04-04 NOTE — PROGRESS NOTES
Virtual Regular VisitName: Severiano Feliciano      : 1935      MRN: [id number]  Encounter Provider: Cesario Stallworth MD  Encounter Date: 2025   Encounter department: [facility] CARE [facility]  :  Assessment & Plan  Chronic idiopathic constipation  Given refill for senna plus 8.6-50 mg daily.    Orders:    senna-docusate sodium (Senna Plus) 8.6-50 mg per tablet; Take 1 tablet by mouth daily    Incontinence Supplies KIT; Use as needed (Fecal/urinary incontinence)    COPD, severe (HCC)  Stressed the importance of adhering to budesonide 0.5 mg twice daily and DuoNeb 3 mL twice daily.  Continue nebulizer as patient unable to use inhalers effectively.  Continue oxygen supplementation.  Continue follow-up with pulmonology.         Impaired mobility and ADLs    Orders:    Incontinence Supplies KIT; Use as needed (Fecal/urinary incontinence)    Medication refill             History of Present Illness     89yo male with impaired ADLs, severe COPD, CHF, vascular dementia, and chronic idiopathic constipation presenting through telehealth visit requesting refill of senna. Pt is accompanied by his wife who helps him throughout the day. She states he had been constipated, had not passed stool in the past week despite senna therapy. She ultimately gave him some miralax along with senna and this allowed pt to defecate. Pt last passed stool this morning, none bloody and without complication. Family is also requesting incontinence supplies for home. ROS as noted below.      Review of Systems   Constitutional:  Negative for appetite change, chills and fever.   Respiratory:  Positive for shortness of breath (baseline).    Gastrointestinal:  Positive for constipation. Negative for abdominal pain, blood in stool, nausea and vomiting.   Genitourinary:  Negative for dysuria and hematuria.       Objective   /72 (BP Location: Right arm, Patient Position: Supine, Cuff Size: Standard)    Pulse 83   Wt 54.4 kg (120 lb)   SpO2 99%   BMI 21.26 kg/m²     Physical Exam  Vitals and nursing note reviewed.   Constitutional:       Appearance: Normal appearance.      Comments: Pt sitting comfortably in recliner with nasal cannula in place.   Neurological:      Mental Status: He is alert.         Administrative Statements   Encounter provider Cesario Stallworth MD    The Patient is located at Home and in the following state in which I hold an active license [place].    The patient was identified by name and date of birth. Severiano Feliciano was informed that this is a telemedicine visit and that the visit is being conducted through the Epic Embedded platform. He agrees to proceed..  My office door was closed. No one else was in the room.  He acknowledged consent and understanding of privacy and security of the video platform. The patient has agreed to participate and understands they can discontinue the visit at any time.    I have spent a total time of 16 minutes in caring for this patient on the day of the visit/encounter including Instructions for management, Patient and family education, Importance of tx compliance, and Obtaining or reviewing history  , not including the time spent for establishing the audio/video connection.

## 2025-04-07 RX ORDER — BUDESONIDE, GLYCOPYRROLATE, AND FORMOTEROL FUMARATE 160; 9; 4.8 UG/1; UG/1; UG/1
AEROSOL, METERED RESPIRATORY (INHALATION)
Qty: 10.7 G | Refills: 5 | Status: SHIPPED | OUTPATIENT
Start: 2025-04-07

## 2025-04-07 RX ORDER — ALBUTEROL SULFATE 90 UG/1
2 INHALANT RESPIRATORY (INHALATION) EVERY 6 HOURS PRN
Qty: 18 G | Refills: 5 | Status: SHIPPED | OUTPATIENT
Start: 2025-04-07

## 2025-04-11 DIAGNOSIS — L21.9 SEBORRHEIC DERMATITIS: ICD-10-CM

## 2025-04-11 DIAGNOSIS — R62.7 FAILURE TO THRIVE IN ADULT: ICD-10-CM

## 2025-04-11 DIAGNOSIS — M54.50 CHRONIC BILATERAL LOW BACK PAIN WITHOUT SCIATICA: ICD-10-CM

## 2025-04-11 DIAGNOSIS — J44.9 COPD, SEVERE (HCC): Primary | ICD-10-CM

## 2025-04-11 DIAGNOSIS — G89.29 CHRONIC BILATERAL LOW BACK PAIN WITHOUT SCIATICA: ICD-10-CM

## 2025-04-11 RX ORDER — MOMETASONE FUROATE 1 MG/ML
SOLUTION TOPICAL DAILY
Qty: 60 ML | Refills: 5 | OUTPATIENT
Start: 2025-04-11

## 2025-04-11 RX ORDER — KETOCONAZOLE 20 MG/ML
SHAMPOO, SUSPENSION TOPICAL
Qty: 120 ML | Refills: 5 | OUTPATIENT
Start: 2025-04-11

## 2025-04-11 RX ORDER — MOMETASONE FUROATE 1 MG/ML
SOLUTION TOPICAL DAILY
Qty: 60 ML | Refills: 5 | Status: SHIPPED | OUTPATIENT
Start: 2025-04-11

## 2025-04-11 RX ORDER — PREDNISONE 5 MG/1
5 TABLET ORAL DAILY
Qty: 30 TABLET | Refills: 5 | Status: SHIPPED | OUTPATIENT
Start: 2025-04-11

## 2025-04-11 RX ORDER — MULTIVITAMIN WITH IRON
1 TABLET ORAL DAILY
Qty: 30 TABLET | Refills: 5 | Status: SHIPPED | OUTPATIENT
Start: 2025-04-11

## 2025-04-11 RX ORDER — KETOCONAZOLE 20 MG/ML
SHAMPOO, SUSPENSION TOPICAL
Qty: 120 ML | Refills: 5 | Status: SHIPPED | OUTPATIENT
Start: 2025-04-11

## 2025-04-11 RX ORDER — SENNOSIDES 8.6 MG
650 CAPSULE ORAL EVERY 8 HOURS PRN
Qty: 90 TABLET | Refills: 5 | Status: SHIPPED | OUTPATIENT
Start: 2025-04-11

## 2025-04-11 NOTE — TELEPHONE ENCOUNTER
The original prescription was discontinued on 2/13/2025 by Kirby Casanova MD for the following reason: Therapy completed. Renewing this prescription may not be appropriate.

## 2025-04-13 PROBLEM — J69.0 ASPIRATION PNEUMONIA (HCC): Status: RESOLVED | Noted: 2020-06-12 | Resolved: 2025-04-13

## 2025-04-30 ENCOUNTER — TELEPHONE (OUTPATIENT)
Dept: FAMILY MEDICINE CLINIC | Facility: CLINIC | Age: 89
End: 2025-04-30

## 2025-04-30 NOTE — TELEPHONE ENCOUNTER
Please advise pt wife stated medical supply for pampers and wipes that was ordered on 4/4/25 did not arrive pt wife is asking if it can be re-ordered.

## 2025-05-06 DIAGNOSIS — Z78.9 IMPAIRED MOBILITY AND ADLS: Primary | ICD-10-CM

## 2025-05-06 DIAGNOSIS — Z74.09 IMPAIRED MOBILITY AND ADLS: Primary | ICD-10-CM

## 2025-05-07 DIAGNOSIS — G47.00 INSOMNIA: ICD-10-CM

## 2025-05-09 RX ORDER — MELATONIN 3 MG
6 CAPSULE ORAL
Qty: 60 CAPSULE | Refills: 0 | Status: SHIPPED | OUTPATIENT
Start: 2025-05-09

## 2025-05-09 NOTE — TELEPHONE ENCOUNTER
Patient's wife called. Patient wife stated that she has been trying to get patient's incontinence supplies for awhile and still does not have the diapers and wipes.    Advised patient that these were called in to [facility] on 5/6/25 and the script was confirmed by the pharmacy 5/6/25.    Patient's wife confirmed that her daughter works for [employer] and they do not have the diapers and wipes.    To better assist patient, I called the pharmacy and spoke directly to the pharmacist who confirmed that they do not fill DME scripts. This would need to be filled by a DME supplier.    Warm transferred patient to [facility] with clinical to further assist patient's wife with patient's need for supplies.

## 2025-05-13 NOTE — PROGRESS NOTES
Reviewed recent telephone notes which state that [facility] pharmacy does not fill DME scripts and the prescription needs to be filled by separate DME supplier.  New DME request ordered today via [employer] ordering, to be delivered to patient's home.

## 2025-05-14 LAB
DME PARACHUTE DELIVERY DATE REQUESTED: NORMAL
DME PARACHUTE ORDER STATUS: NORMAL
DME PARACHUTE SUPPLIER NAME: NORMAL
DME PARACHUTE SUPPLIER PHONE: NORMAL

## 2025-05-19 DIAGNOSIS — Z79.52 LONG TERM (CURRENT) USE OF SYSTEMIC STEROIDS: ICD-10-CM

## 2025-05-20 RX ORDER — PANTOPRAZOLE SODIUM 40 MG/1
40 TABLET, DELAYED RELEASE ORAL DAILY
Qty: 30 TABLET | Refills: 5 | Status: SHIPPED | OUTPATIENT
Start: 2025-05-20

## 2025-06-23 DIAGNOSIS — I10 BENIGN ESSENTIAL HYPERTENSION: ICD-10-CM

## 2025-06-23 RX ORDER — LOSARTAN POTASSIUM 25 MG/1
TABLET ORAL
Qty: 180 TABLET | Refills: 1 | Status: SHIPPED | OUTPATIENT
Start: 2025-06-23

## 2025-06-26 ENCOUNTER — TELEMEDICINE (OUTPATIENT)
Dept: FAMILY MEDICINE CLINIC | Facility: CLINIC | Age: 89
End: 2025-06-26
Payer: MEDICARE

## 2025-06-26 DIAGNOSIS — N39.45 CONTINUOUS LEAKAGE OF URINE: ICD-10-CM

## 2025-06-26 DIAGNOSIS — G93.1 HYPOXIC ENCEPHALOPATHY (HCC): ICD-10-CM

## 2025-06-26 DIAGNOSIS — J44.9 COPD, SEVERE (HCC): Primary | ICD-10-CM

## 2025-06-26 PROCEDURE — 99214 OFFICE O/P EST MOD 30 MIN: CPT | Performed by: FAMILY MEDICINE

## 2025-06-26 PROCEDURE — G2211 COMPLEX E/M VISIT ADD ON: HCPCS | Performed by: FAMILY MEDICINE

## 2025-06-26 RX ORDER — FORMOTEROL FUMARATE 20 UG/2ML
SOLUTION RESPIRATORY (INHALATION)
COMMUNITY
Start: 2025-06-13

## 2025-06-26 RX ORDER — TORSEMIDE 5 MG/1
TABLET ORAL DAILY
COMMUNITY
Start: 2025-04-09

## 2025-06-26 NOTE — ASSESSMENT & PLAN NOTE
End-stage COPD: Patient remains stable on long-term oxygen therapy. The patient's chronic hypoxemia has led to secondary complications including encephalopathy. Will continue current management with home oxygen. Emphasized the importance of consistent oxygen use to prevent worsening of hypoxemia and associated cognitive symptoms. Will reassess at next visit for any changes in respiratory status or oxygen requirements.

## 2025-06-26 NOTE — ASSESSMENT & PLAN NOTE
Hypoxic Encephalopathy: Patient exhibits altered mental status with confusion and memory issues, likely secondary to chronic hypoxemia from COPD. Caregiver reports patient has difficulty with normal conversation and appears to have worsening cognitive function. This was particularly evident during a recent family trip where the patient's mental status appeared more altered than usual. Will continue to monitor cognitive status and consider formal cognitive assessment at next visit if symptoms worsen.

## 2025-06-26 NOTE — PROGRESS NOTES
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 The patient's chronic hypoxemia has led to secondary complications including encephalopathy. Will continue current management with home oxygen. Emphasized the importance of consistent oxygen use to prevent worsening of hypoxemia and associated cognitive symptoms. Will reassess at next visit for any changes in respiratory status or oxygen requirements.         Hypoxic encephalopathy (HCC)   Hypoxic Encephalopathy: Patient exhibits altered mental status with confusion and memory issues, likely secondary to chronic hypoxemia from COPD. Caregiver reports patient has difficulty with normal conversation and appears to have worsening cognitive function. This was particularly evident during a recent family trip where the patient's mental status appeared more altered than usual. Will continue to monitor cognitive status and consider formal cognitive assessment at next visit if symptoms worsen.         Continuous leakage of urine   Urinary Incontinence: Patient requires adult diapers for management. Caregiver reports difficulty affording supplies. Will attempt to coordinate with insurance to provide coverage for incontinence supplies (adult pull-up style diapers) to be delivered to home. Provided education on the importance of frequent changes to prevent skin breakdown.    Skin Integrity: Caregiver reports darkening of skin in the gluteal region without breakdown. This is likely pressure-related skin changes from prolonged sitting. Advised continued vigilance with skin checks and current preventative measures including application of petroleum jelly. Instructed caregiver to report any skin breakdown, redness,  "or lesions immediately. Emphasized importance of position changes when possible to relieve pressure.  Orders:    Diapers & Supplies (Huggies Pull-Ups) MISC; Use every 4 (four) hours           Follow-up Visit for End-stage COPD with Encephalopathy and Urinary Incontinence    Subjective:  Mr. Fernandez is an 89-year-old male with end-stage COPD and hypoxic encephalopathy presenting for follow-up. Information obtained from caregiver (wife). Caregiver reports patient has been stable without fever or vomiting. Primary concern is darkening of skin in the gluteal region, though no breakdown or open areas noted. Caregiver applies petroleum jelly to prevent skin breakdown and expresses concern about proper care. His Wife reports difficulty affording adult diapers (pull-up style) and inquires about insurance coverage for these supplies. Patient recently went on a family trip [place], during which they had to call emergency services (911) for an unspecified issue, though paramedics responded quickly. Viridiana, his wife notes the patient has altered mental status with confusion and memory issues, stating \"his mind has changed\" and attributes this to \"poor oxygen\" (likely referring to hypoxic encephalopathy). Patient has difficulty with normal conversation and often talks about  family members. Despite challenges, caregiver reports they are managing and trying to provide the best possible quality of life for the patient.              Administrative Statements   Encounter provider Kirby Casanova MD    The Patient is located at Home and in the following state in which I hold an active license [place].    The patient was identified by name and date of birth. Severiano Feliciano was informed that this is a telemedicine visit and that the visit is being conducted through the Epic Embedded platform. He agrees to proceed..  My office door was closed. No one else was in the room.  He acknowledged consent and " understanding of privacy and security of the video platform. The patient has agreed to participate and understands they can discontinue the visit at any time.    I have spent a total time of 25 minutes in caring for this patient on the day of the visit/encounter including Diagnostic results, Prognosis, and Instructions for management, not including the time spent for establishing the audio/video connection.

## 2025-06-26 NOTE — ASSESSMENT & PLAN NOTE
Urinary Incontinence: Patient requires adult diapers for management. Caregiver reports difficulty affording supplies. Will attempt to coordinate with insurance to provide coverage for incontinence supplies (adult pull-up style diapers) to be delivered to home. Provided education on the importance of frequent changes to prevent skin breakdown.    Skin Integrity: Caregiver reports darkening of skin in the gluteal region without breakdown. This is likely pressure-related skin changes from prolonged sitting. Advised continued vigilance with skin checks and current preventative measures including application of petroleum jelly. Instructed caregiver to report any skin breakdown, redness, or lesions immediately. Emphasized importance of position changes when possible to relieve pressure.  Orders:    Diapers & Supplies (Huggies Pull-Ups) MISC; Use every 4 (four) hours

## 2025-06-26 NOTE — LETTER
June 26, 2025     Patient: Severiano Feliciano  YOB: 1935  Date of Visit: 6/26/2025      [facility]  [id number]  [id number]  [email or web address]  Phone [phone]  Fax: [phone]        Pullups Small, to use 6 per day.    Rx 180, 5 refils.    Kirby Casanova MD

## 2025-06-30 DIAGNOSIS — G89.29 CHRONIC BILATERAL LOW BACK PAIN WITHOUT SCIATICA: ICD-10-CM

## 2025-06-30 DIAGNOSIS — M54.50 CHRONIC BILATERAL LOW BACK PAIN WITHOUT SCIATICA: ICD-10-CM

## 2025-07-01 DIAGNOSIS — J44.9 CHRONIC OBSTRUCTIVE PULMONARY DISEASE, UNSPECIFIED COPD TYPE (HCC): ICD-10-CM

## 2025-07-01 RX ORDER — SENNOSIDES 8.6 MG
CAPSULE ORAL
Qty: 90 TABLET | Refills: 5 | Status: SHIPPED | OUTPATIENT
Start: 2025-07-01

## 2025-07-01 RX ORDER — IPRATROPIUM BROMIDE AND ALBUTEROL SULFATE 2.5; .5 MG/3ML; MG/3ML
SOLUTION RESPIRATORY (INHALATION)
Qty: 540 ML | Refills: 3 | Status: SHIPPED | OUTPATIENT
Start: 2025-07-01

## 2025-07-01 NOTE — EMTALA/ACUTE CARE TRANSFER
[facility] EMERGENCY DEPARTMENT  [address] [address]  [phone]  Dept: [phone]      EMTALA TRANSFER CONSENT    NAME Severiano Feliciano                                         1935                              MRN [id number]    I have been informed of my rights regarding examination, treatment, and transfer   by Dr. Freddy Hightower MD    Benefits: Specialized equipment and/or services available at the receiving facility (Include comment)________________________    Risks: Potential for delay in receiving treatment      Consent for Transfer:  I acknowledge that my medical condition has been evaluated and explained to me by the emergency department physician or other qualified medical person and/or my attending physician, who has recommended that I be transferred to the service of  Accepting Physician: Dr Ann-Sayed at Accepting Facility Name, City & State : [facility]. The above potential benefits of such transfer, the potential risks associated with such transfer, and the probable risks of not being transferred have been explained to me, and I fully understand them.  The doctor has explained that, in my case, the benefits of transfer outweigh the risks.  I agree to be transferred.    I authorize the performance of emergency medical procedures and treatments upon me in both transit and upon arrival at the receiving facility.  Additionally, I authorize the release of any and all medical records to the receiving facility and request they be transported with me, if possible.  I understand that the safest mode of transportation during a medical emergency is an ambulance and that the Hospital advocates the use of this mode of transport. Risks of traveling to the receiving facility by car, including absence of medical control, life sustaining equipment, such as oxygen, and medical personnel has been explained to me and I fully understand them.    (TANG CORRECT BOX BELOW)  [   ]  I consent to the stated transfer and to be transported by ambulance/helicopter.  [  ]  I consent to the stated transfer, but refuse transportation by ambulance and accept full responsibility for my transportation by car.  I understand the risks of non-ambulance transfers and I exonerate the Hospital and its staff from any deterioration in my condition that results from this refusal.    X___________________________________________    DATE  25  TIME________  Signature of patient or legally responsible individual signing on patient behalf           RELATIONSHIP TO PATIENT_________________________          Provider Certification    NAME Severiano Feliciano                                         1935                              MRN [id number]    A medical screening exam was performed on the above named patient.  Based on the examination:    Condition Necessitating Transfer The primary encounter diagnosis was Altered mental status, unspecified altered mental status type. Diagnoses of Respiratory failure (HCC) and Pneumonia were also pertinent to this visit.    Patient Condition: The patient has been stabilized such that within reasonable medical probability, no material deterioration of the patient condition or the condition of the unborn child(joselito) is likely to result from the transfer    Reason for Transfer: Level of Care needed not available at this facility    Transfer Requirements: Facility SLA   Space available and qualified personnel available for treatment as acknowledged by    Agreed to accept transfer and to provide appropriate medical treatment as acknowledged by       Dr Ann-Jefryed  Appropriate medical records of the examination and treatment of the patient are provided at the time of transfer   STAFF INITIAL WHEN COMPLETED _______  Transfer will be performed by qualified personnel from    and appropriate transfer equipment as required, including the use of necessary and appropriate life  support measures.    Provider Certification: I have examined the patient and explained the following risks and benefits of being transferred/refusing transfer to the patient/family:  General risk, such as traffic hazards, adverse weather conditions, rough terrain or turbulence, possible failure of equipment (including vehicle or aircraft), or consequences of actions of persons outside the control of the transport personnel      Based on these reasonable risks and benefits to the patient and/or the unborn child(joselito), and based upon the information available at the time of the patient’s examination, I certify that the medical benefits reasonably to be expected from the provision of appropriate medical treatments at another medical facility outweigh the increasing risks, if any, to the individual’s medical condition, and in the case of labor to the unborn child, from effecting the transfer.    X____________________________________________ DATE 03/09/25        TIME_______      ORIGINAL - SEND TO MEDICAL RECORDS   COPY - SEND WITH PATIENT DURING TRANSFER   noninvasive blood pressure monitor

## 2025-07-03 ENCOUNTER — TELEPHONE (OUTPATIENT)
Age: 89
End: 2025-07-03

## 2025-07-03 DIAGNOSIS — J96.11 CHRONIC RESPIRATORY FAILURE WITH HYPOXIA AND HYPERCAPNIA (HCC): ICD-10-CM

## 2025-07-03 DIAGNOSIS — J96.12 CHRONIC RESPIRATORY FAILURE WITH HYPOXIA AND HYPERCAPNIA (HCC): ICD-10-CM

## 2025-07-03 NOTE — TELEPHONE ENCOUNTER
Yenny from [employer] was contacting in regards to incontinence supplies.  States they faxed paperwork yesterday.    Seen in media script from Dr. Casanova, nothing from [employer].      Once received complete and fax back

## 2025-07-04 RX ORDER — ALBUTEROL SULFATE 90 UG/1
INHALANT RESPIRATORY (INHALATION)
Qty: 18 G | Refills: 5 | Status: SHIPPED | OUTPATIENT
Start: 2025-07-04

## 2025-07-19 DIAGNOSIS — J44.9 COPD, SEVERE (HCC): ICD-10-CM

## 2025-07-20 DIAGNOSIS — K59.04 CHRONIC IDIOPATHIC CONSTIPATION: ICD-10-CM

## 2025-07-21 RX ORDER — SENNA AND DOCUSATE SODIUM 50; 8.6 MG/1; MG/1
TABLET, FILM COATED ORAL
Qty: 30 TABLET | Refills: 5 | Status: SHIPPED | OUTPATIENT
Start: 2025-07-21

## 2025-07-21 RX ORDER — PREDNISONE 5 MG/1
5 TABLET ORAL DAILY
Qty: 30 TABLET | Refills: 5 | Status: SHIPPED | OUTPATIENT
Start: 2025-07-21

## 2025-07-25 DIAGNOSIS — R62.7 FAILURE TO THRIVE IN ADULT: ICD-10-CM

## 2025-07-28 RX ORDER — MULTIVITAMIN WITH IRON
TABLET ORAL
Qty: 30 TABLET | Refills: 5 | Status: SHIPPED | OUTPATIENT
Start: 2025-07-28

## (undated) DEVICE — DILATOR: Brand: COOK

## (undated) DEVICE — SPONGE LAP STERILE 4X18

## (undated) DEVICE — GLOVE SRG BIOGEL ECLIPSE 7.5

## (undated) DEVICE — CHLORAPREP HI-LITE 26ML ORANGE

## (undated) DEVICE — GLOVE SRG BIOGEL 7

## (undated) DEVICE — PAD GROUNDING ADULT

## (undated) DEVICE — MICROPUNCTURE 501

## (undated) DEVICE — SYRINGE KIT,PACKAGED,,150FT,MK 7(ANGIO-ARTERION, 150ML SYR KIT W/QFT,MC)(60729385): Brand: MEDRAD® MARK 7 ARTERION DISPOSABLE SYRINGE 150 ML WITH QUICK FILL TUBE

## (undated) DEVICE — VIOLET BRAIDED (POLYGLACTIN 910), SYNTHETIC ABSORBABLE SUTURE: Brand: COATED VICRYL

## (undated) DEVICE — PROXIMATE PLUS MD MULTI-DIRECTIONAL RELEASE SKIN STAPLERS CONTAINS 35 STAINLESS STEEL STAPLES APPROXIMATE CLOSED DIMENSIONS: 6.9MM X 3.9MM WIDE: Brand: PROXIMATE

## (undated) DEVICE — PINNACLE R/O II INTRODUCER SHEATH WITH RADIOPAQUE MARKER: Brand: PINNACLE

## (undated) DEVICE — FLUID MANAGEMENT KIT - IR

## (undated) DEVICE — SUT PDS II 2-0 CT-2 27 IN Z333H

## (undated) DEVICE — COVER PROBE INTRAOPERATIVE 6 X 96 IN

## (undated) DEVICE — STERILE POLYISOPRENE POWDER-FREE SURGICAL GLOVES: Brand: PROTEXIS

## (undated) DEVICE — CATH DIAG 5FR 38 65CM NS SIM1

## (undated) DEVICE — GLOVE INDICATOR PI UNDERGLOVE SZ 8 BLUE

## (undated) DEVICE — GUIDEWIRE LUNDERQUIST TSCMG-35-300-LESDC

## (undated) DEVICE — NDL CNTR 20CT FM MAG: Brand: MEDLINE INDUSTRIES, INC.

## (undated) DEVICE — 3M™ STERI-STRIP™ REINFORCED ADHESIVE SKIN CLOSURES, R1547, 1/2 IN X 4 IN (12 MM X 100 MM), 6 STRIPS/ENVELOPE: Brand: 3M™ STERI-STRIP™

## (undated) DEVICE — X-RAY DETECTABLE SPONGES,16 PLY: Brand: VISTEC

## (undated) DEVICE — PROVE COVER: Brand: UNBRANDED

## (undated) DEVICE — ANGIOGRAPHIC CATHETER: Brand: IMAGER™ II

## (undated) DEVICE — GLOVE INDICATOR PI UNDERGLOVE SZ 6.5 BLUE

## (undated) DEVICE — INTENDED FOR TISSUE SEPARATION, AND OTHER PROCEDURES THAT REQUIRE A SHARP SURGICAL BLADE TO PUNCTURE OR CUT.: Brand: BARD-PARKER SAFETY BLADES SIZE 15, STERILE

## (undated) DEVICE — PENCIL ELECTROSURG E-Z CLEAN -0035H

## (undated) DEVICE — TRAY FOLEY 16FR URIMETER SURESTEP

## (undated) DEVICE — 2000CC GUARDIAN II: Brand: GUARDIAN

## (undated) DEVICE — SCD SEQUENTIAL COMPRESSION COMFORT SLEEVE MEDIUM KNEE LENGTH: Brand: KENDALL SCD

## (undated) DEVICE — SYRINGE 50ML LL

## (undated) DEVICE — STRL PENROSE DRAIN 18" X 1/4": Brand: CARDINAL HEALTH

## (undated) DEVICE — DRAPE EQUIPMENT RF WAND

## (undated) DEVICE — CATH DIAG 5FR .035 70CM PIG CSC 20

## (undated) DEVICE — NEEDLE BLUNT 18 G X 1 1/2IN

## (undated) DEVICE — SYRINGE 10ML LL CONTROL TOP

## (undated) DEVICE — SUT VICRYL 3-0 SH 27 IN J416H

## (undated) DEVICE — STERILE ICS CARDIOVASCULAR PK: Brand: CARDINAL HEALTH

## (undated) DEVICE — CATH ANGIO 4FR 70CM  2CM SEGMENT ACCU-VU

## (undated) DEVICE — ADHESIVE SKN CLSR HISTOACRYL FLEX 0.5ML LF

## (undated) DEVICE — PINNACLE INTRODUCER SHEATH: Brand: PINNACLE

## (undated) DEVICE — SKIN MARKER DUAL TIP WITH RULER CAP, FLEXIBLE RULER AND LABELS: Brand: DEVON

## (undated) DEVICE — CATH BAL DIL TRI-LOBE TAG LARGE 26-42 MM

## (undated) DEVICE — SUT MONOCRYL 4-0 PS-2 27 IN Y426H

## (undated) DEVICE — 3M™ IOBAN™ 2 ANTIMICROBIAL INCISE DRAPE 6651EZ: Brand: IOBAN™ 2

## (undated) DEVICE — UNDYED BRAIDED (POLYGLACTIN 910), SYNTHETIC ABSORBABLE SUTURE: Brand: COATED VICRYL

## (undated) DEVICE — RADIFOCUS GLIDEWIRE: Brand: GLIDEWIRE

## (undated) DEVICE — TIBURON TRANSVERSE LAPAROTOMY SHEET: Brand: CONVERTORS

## (undated) DEVICE — PENROSE DRAIN, 18 X 3 8: Brand: CARDINAL HEALTH

## (undated) DEVICE — NON-DEHP HIGH FLOW RATE EXTENSION SET, MALE LUER LOCK ADAPTER

## (undated) DEVICE — 1200CC GUARDIAN II: Brand: GUARDIAN

## (undated) DEVICE — BETHLEHEM MAJOR GENERAL PACK: Brand: CARDINAL HEALTH

## (undated) DEVICE — NEEDLE 25G X 1 1/2

## (undated) DEVICE — GLOVE INDICATOR PI UNDERGLOVE SZ 7 BLUE

## (undated) DEVICE — ADHESIVE SKIN HIGH VISCOSITY EXOFIN 1ML

## (undated) DEVICE — BASIC PACK: Brand: CONVERTORS

## (undated) DEVICE — GLOVE SRG BIOGEL ECLIPSE 6

## (undated) DEVICE — Device

## (undated) DEVICE — SHEATH INTRO DRY SEAL 24FR 33CM

## (undated) DEVICE — 3000CC GUARDIAN II: Brand: GUARDIAN

## (undated) DEVICE — SUT VICRYL 2-0 REEL 54 IN J286G

## (undated) DEVICE — GAUZE SPONGES,16 PLY: Brand: CURITY

## (undated) DEVICE — SUT PROLENE 2-0 SH 30 IN 8833H

## (undated) DEVICE — SURGICAL CLIPPER BLADE GENERAL USE

## (undated) DEVICE — SYRINGE 30ML LL

## (undated) DEVICE — SNAP KOVER: Brand: UNBRANDED

## (undated) DEVICE — SYRINGE 20ML LL

## (undated) DEVICE — SWABSTCK, BENZOIN TINCTURE, 1/PK, STRL: Brand: APLICARE

## (undated) DEVICE — STOPCOCK 4 WAY LL HIGH PRESSURE

## (undated) DEVICE — BETHLEHEM UNIVERSAL MINOR GEN: Brand: CARDINAL HEALTH